# Patient Record
Sex: MALE | Race: WHITE | NOT HISPANIC OR LATINO | Employment: OTHER | URBAN - METROPOLITAN AREA
[De-identification: names, ages, dates, MRNs, and addresses within clinical notes are randomized per-mention and may not be internally consistent; named-entity substitution may affect disease eponyms.]

---

## 2018-11-20 ENCOUNTER — APPOINTMENT (OUTPATIENT)
Dept: PREADMISSION TESTING | Facility: HOSPITAL | Age: 73
End: 2018-11-20

## 2019-01-07 ENCOUNTER — DOCUMENTATION (OUTPATIENT)
Dept: SOCIAL WORK | Facility: HOSPITAL | Age: 74
End: 2019-01-07

## 2019-01-07 NOTE — PROGRESS NOTES
1/7/19 I spoke with Mr Singh by phone re: d/c plan for upcoming surgery. His plan is to return home. He lives with wife who works,but she plans to be home for 3 days post op, thereafter will be home during day ( she works night shift). He states he also works, [employer] is his primary insurance( he is covered under his wife's plan). He plans to attend pre op joint class tomorrow 1/8. We discussed options for PT, he would like to use [facility]. We also discussed Medicare guidelines that classify TKR's as overnight outpatient and insurance likely will do the same. He verbalized understanding. No other questions verbalized.  S.Kellerman RN, case mgt

## 2019-01-08 ENCOUNTER — HOSPITAL ENCOUNTER (OUTPATIENT)
Dept: GENERAL RADIOLOGY | Facility: HOSPITAL | Age: 74
Discharge: HOME | End: 2019-01-08
Admitting: ORTHOPAEDIC SURGERY

## 2019-01-08 ENCOUNTER — APPOINTMENT (OUTPATIENT)
Dept: PREADMISSION TESTING | Facility: HOSPITAL | Age: 74
End: 2019-01-08

## 2019-01-08 VITALS — BODY MASS INDEX: 28.23 KG/M2 | HEIGHT: 74 IN | WEIGHT: 220 LBS

## 2019-01-08 LAB
ANION GAP SERPL CALCULATED.3IONS-SCNC: 9 MMOL/L (ref 3–11)
BUN BLD-MCNC: 26 MG/DL (ref 9–23)
BUN/CREAT SERPL: 25.2 (ref 7–25)
CALCIUM SPEC-SCNC: 9.4 MG/DL (ref 8.7–10.4)
CHLORIDE SERPL-SCNC: 101 MMOL/L (ref 99–109)
CO2 SERPL-SCNC: 27 MMOL/L (ref 20–31)
CREAT BLD-MCNC: 1.03 MG/DL (ref 0.6–1.3)
DEPRECATED RDW RBC AUTO: 40.3 FL (ref 37–54)
ERYTHROCYTE [DISTWIDTH] IN BLOOD BY AUTOMATED COUNT: 12.7 % (ref 11.3–14.5)
GFR SERPL CREATININE-BSD FRML MDRD: 71 ML/MIN/1.73
GLUCOSE BLD-MCNC: 282 MG/DL (ref 70–100)
HBA1C MFR BLD: 11.1 % (ref 4.8–5.6)
HCT VFR BLD AUTO: 47.2 % (ref 38.9–50.9)
HGB BLD-MCNC: 15.9 G/DL (ref 13.1–17.5)
MCH RBC QN AUTO: 29.3 PG (ref 27–31)
MCHC RBC AUTO-ENTMCNC: 33.7 G/DL (ref 32–36)
MCV RBC AUTO: 87.1 FL (ref 80–99)
PLATELET # BLD AUTO: 217 10*3/MM3 (ref 150–450)
PMV BLD AUTO: 11.2 FL (ref 6–12)
POTASSIUM BLD-SCNC: 4.7 MMOL/L (ref 3.5–5.5)
RBC # BLD AUTO: 5.42 10*6/MM3 (ref 4.2–5.76)
SODIUM BLD-SCNC: 137 MMOL/L (ref 132–146)
WBC NRBC COR # BLD: 7.3 10*3/MM3 (ref 3.5–10.8)

## 2019-01-08 PROCEDURE — 36415 COLL VENOUS BLD VENIPUNCTURE: CPT

## 2019-01-08 PROCEDURE — 83036 HEMOGLOBIN GLYCOSYLATED A1C: CPT | Performed by: ORTHOPAEDIC SURGERY

## 2019-01-08 PROCEDURE — 80048 BASIC METABOLIC PNL TOTAL CA: CPT | Performed by: ORTHOPAEDIC SURGERY

## 2019-01-08 PROCEDURE — 71046 X-RAY EXAM CHEST 2 VIEWS: CPT

## 2019-01-08 PROCEDURE — 85027 COMPLETE CBC AUTOMATED: CPT | Performed by: ORTHOPAEDIC SURGERY

## 2019-01-08 RX ORDER — NAPROXEN 500 MG/1
500 TABLET ORAL 2 TIMES DAILY PRN
COMMUNITY
End: 2019-03-28 | Stop reason: HOSPADM

## 2019-01-08 ASSESSMENT — KOOS JR
KOOS JR SCORE: 57.14
KOOS JR SCORE: 12

## 2019-01-08 NOTE — DISCHARGE INSTRUCTIONS
The following information and instructions were given:    NPO after MN except sips of water with routine prescribed medication (except blood thinners, certain blood pressure medications, diabetes medications, or weight reducing medications) unless otherwise instructed by your physician.  Do not eat, drink, smoke or chew gum after midnight the night before surgery. This also includes no mints.    DO NOT shave for two days before your procedure.  Do not wear makeup.      DO NOT wear fingernail polish (gel/regular) and/or acrylic/artificial nails on the day of surgery.   If a patient had recent manicure and would rather not remove polish or artificial nails, then the minimum requirement is that the polish/artificial nails must be removed from the middle finger on each hand.      If patient is having surgery/procedure on an upper extremity, then the patient was instructed that fingernail polish/artificial fingernails must be removed for surgery.  NO EXCEPTIONS.      If patient is having surgery/procedure on a lower extremity, then the patient was instructed that toenail polish on both extremities must be removed for surgery.  NO EXCEPTIONS.    Remove all jewelry (advised to go to jeweler if unable to remove).  Jewelry, especially rings, can no longer be taped for surgery.    Leave anything you consider valuable at home.    Leave your suitcase in the car until after your surgery.    Bring the following with you (if applicable)       -picture ID and insurance cards       -Co-pay/deductible required by insurance       -Medications in the original bottles (not a list) including all over-the-counter  medications if not brought to PAT       -Copy of advance directive, living will or power of  documents if not  brought to [facility]       -CPAP or BIPAP mask and tubing (do not bring machine)       -Skin prep instructions sheet       -PAT Pass    Education booklet, brochure, handout or binder given to patient.    Pain Control  After Surgery handout given to patient.    Respirex use (handout given to patient) and pneumonia prevention.    Signs and Symptoms of infection discussed.    DVT Prevention education given.  Stressing the importance of ambulation.    Patient to apply Chlorhexadine wipes to surgical area (as instructed) the night before procedure and the AM of procedure.    When applicable patients with ERAS orders were instructed to drink 20 ounces of Gatorade or G2 for diabetics (or until full) the morning of surgery.  The Gatorade or G2 must be consumed at least 1 hour before arrival time on the day of surgery .  No RED Gatorade or G2.  Appropriate ERAS handout and/or booklet given to patient during PAT visit.

## 2019-01-08 NOTE — PAT
"Patient to apply Chlorhexadine wipes  to surgical area (as instructed) the night before procedure and the AM of procedure. Wipes provided.    Patient instructed to drink 20 ounces (or until full) of Gatorade or 20 ounces of G2 (if diabetic) and complete 3 hours before your surgery start time. (NO RED Gatorade or G2)    Patient verbalized understanding.    Patient scored 4 out of 5 on memory test.  He remembered 2 of the words but when questioned about the third word, he seemed a little defensive. And stated \"Give me a minute and I will try again.\"  But he still couldn't remember.  Patient jean the clock easily and correctly except he put one of the hands on the 12 before quickly realizing his error.  While patient was out of the room, the wife expressed some concerns about his memory and asked if we could take precautions after surgery to prevent delays in his hospital stay due to confusion.  Confusion booklet given to patient and wife for review.  Notified my supervisor, Spring Soni, of patient's potential for confusion after surgery so she could forward his information to the proper staff to prepare for his arrival on 1/17/18.    Patient cleared by Dr Apodaca on 12/12/18    Patient also saw his family MD for clearance on 12/20/18  "

## 2019-02-12 ENCOUNTER — TRANSCRIBE ORDERS (OUTPATIENT)
Dept: ADMINISTRATIVE | Facility: HOSPITAL | Age: 74
End: 2019-02-12

## 2019-02-12 DIAGNOSIS — I20.0 UNSTABLE ANGINA (HCC): Primary | ICD-10-CM

## 2019-02-14 ENCOUNTER — HOSPITAL ENCOUNTER (OUTPATIENT)
Facility: HOSPITAL | Age: 74
Setting detail: HOSPITAL OUTPATIENT SURGERY
Discharge: HOME | End: 2019-02-14
Attending: INTERNAL MEDICINE | Admitting: INTERNAL MEDICINE

## 2019-02-14 VITALS
TEMPERATURE: 97.2 F | WEIGHT: 217.37 LBS | HEIGHT: 74 IN | HEART RATE: 74 BPM | DIASTOLIC BLOOD PRESSURE: 78 MMHG | RESPIRATION RATE: 16 BRPM | SYSTOLIC BLOOD PRESSURE: 107 MMHG | OXYGEN SATURATION: 94 % | BODY MASS INDEX: 27.9 KG/M2

## 2019-02-14 DIAGNOSIS — I20.0 UNSTABLE ANGINA (HCC): ICD-10-CM

## 2019-02-14 LAB
BUN BLDA-MCNC: 19 MG/DL (ref 8–26)
CA-I BLDA-SCNC: 1.24 MMOL/L (ref 1.2–1.32)
CHLORIDE BLDA-SCNC: 101 MMOL/L (ref 98–109)
CO2 BLDA-SCNC: 27 MMOL/L (ref 24–29)
CREAT BLDA-MCNC: 0.8 MG/DL (ref 0.6–1.3)
GLUCOSE BLDC GLUCOMTR-MCNC: 139 MG/DL (ref 70–130)
GLUCOSE BLDC GLUCOMTR-MCNC: 245 MG/DL (ref 70–130)
HCT VFR BLDA CALC: 45 % (ref 38–51)
HGB BLDA-MCNC: 15.3 G/DL (ref 12–17)
POTASSIUM BLDA-SCNC: 4.4 MMOL/L (ref 3.5–4.9)
SODIUM BLDA-SCNC: 142 MMOL/L (ref 138–146)

## 2019-02-14 PROCEDURE — C1769 GUIDE WIRE: HCPCS | Performed by: INTERNAL MEDICINE

## 2019-02-14 PROCEDURE — 93005 ELECTROCARDIOGRAM TRACING: CPT | Performed by: INTERNAL MEDICINE

## 2019-02-14 PROCEDURE — 36415 COLL VENOUS BLD VENIPUNCTURE: CPT

## 2019-02-14 PROCEDURE — 25010000002 MIDAZOLAM PER 1 MG: Performed by: INTERNAL MEDICINE

## 2019-02-14 PROCEDURE — 25010000002 FENTANYL CITRATE (PF) 100 MCG/2ML SOLUTION: Performed by: INTERNAL MEDICINE

## 2019-02-14 PROCEDURE — C1894 INTRO/SHEATH, NON-LASER: HCPCS | Performed by: INTERNAL MEDICINE

## 2019-02-14 PROCEDURE — 85014 HEMATOCRIT: CPT

## 2019-02-14 PROCEDURE — 82962 GLUCOSE BLOOD TEST: CPT

## 2019-02-14 PROCEDURE — 75630 X-RAY AORTA LEG ARTERIES: CPT | Performed by: INTERNAL MEDICINE

## 2019-02-14 PROCEDURE — 80047 BASIC METABLC PNL IONIZED CA: CPT

## 2019-02-14 PROCEDURE — 0 IOPAMIDOL PER 1 ML: Performed by: INTERNAL MEDICINE

## 2019-02-14 PROCEDURE — C1760 CLOSURE DEV, VASC: HCPCS | Performed by: INTERNAL MEDICINE

## 2019-02-14 PROCEDURE — 93458 L HRT ARTERY/VENTRICLE ANGIO: CPT | Performed by: INTERNAL MEDICINE

## 2019-02-14 RX ORDER — ASPIRIN 325 MG
325 TABLET, DELAYED RELEASE (ENTERIC COATED) ORAL DAILY
Status: DISCONTINUED | OUTPATIENT
Start: 2019-02-14 | End: 2019-02-14 | Stop reason: HOSPADM

## 2019-02-14 RX ORDER — LIDOCAINE HYDROCHLORIDE 10 MG/ML
INJECTION, SOLUTION EPIDURAL; INFILTRATION; INTRACAUDAL; PERINEURAL AS NEEDED
Status: DISCONTINUED | OUTPATIENT
Start: 2019-02-14 | End: 2019-02-14 | Stop reason: HOSPADM

## 2019-02-14 RX ORDER — ACETAMINOPHEN 325 MG/1
650 TABLET ORAL EVERY 4 HOURS PRN
Status: DISCONTINUED | OUTPATIENT
Start: 2019-02-14 | End: 2019-02-14 | Stop reason: HOSPADM

## 2019-02-14 RX ORDER — NICOTINE POLACRILEX 4 MG
15 LOZENGE BUCCAL
Status: DISCONTINUED | OUTPATIENT
Start: 2019-02-14 | End: 2019-02-14 | Stop reason: HOSPADM

## 2019-02-14 RX ORDER — DEXTROSE MONOHYDRATE 25 G/50ML
25 INJECTION, SOLUTION INTRAVENOUS
Status: DISCONTINUED | OUTPATIENT
Start: 2019-02-14 | End: 2019-02-14 | Stop reason: HOSPADM

## 2019-02-14 RX ORDER — ALPRAZOLAM 0.25 MG/1
0.25 TABLET ORAL 3 TIMES DAILY PRN
Status: DISCONTINUED | OUTPATIENT
Start: 2019-02-14 | End: 2019-02-14 | Stop reason: HOSPADM

## 2019-02-14 RX ORDER — OXYCODONE AND ACETAMINOPHEN 10; 325 MG/1; MG/1
1 TABLET ORAL EVERY 4 HOURS PRN
Status: DISCONTINUED | OUTPATIENT
Start: 2019-02-14 | End: 2019-02-14 | Stop reason: HOSPADM

## 2019-02-14 RX ORDER — FENTANYL CITRATE 50 UG/ML
INJECTION, SOLUTION INTRAMUSCULAR; INTRAVENOUS AS NEEDED
Status: DISCONTINUED | OUTPATIENT
Start: 2019-02-14 | End: 2019-02-14 | Stop reason: HOSPADM

## 2019-02-14 RX ORDER — MIDAZOLAM HYDROCHLORIDE 1 MG/ML
INJECTION INTRAMUSCULAR; INTRAVENOUS AS NEEDED
Status: DISCONTINUED | OUTPATIENT
Start: 2019-02-14 | End: 2019-02-14 | Stop reason: HOSPADM

## 2019-02-14 RX ORDER — TEMAZEPAM 7.5 MG/1
7.5 CAPSULE ORAL NIGHTLY PRN
Status: DISCONTINUED | OUTPATIENT
Start: 2019-02-14 | End: 2019-02-14 | Stop reason: HOSPADM

## 2019-02-14 RX ORDER — SODIUM CHLORIDE 9 MG/ML
250 INJECTION, SOLUTION INTRAVENOUS CONTINUOUS
Status: ACTIVE | OUTPATIENT
Start: 2019-02-14 | End: 2019-02-14

## 2019-02-14 RX ORDER — HYDROCODONE BITARTRATE AND ACETAMINOPHEN 5; 325 MG/1; MG/1
1 TABLET ORAL EVERY 4 HOURS PRN
Status: DISCONTINUED | OUTPATIENT
Start: 2019-02-14 | End: 2019-02-14 | Stop reason: HOSPADM

## 2019-02-14 RX ADMIN — ACETAMINOPHEN 650 MG: 325 TABLET, FILM COATED ORAL at 16:40

## 2019-02-14 RX ADMIN — ASPIRIN 325 MG: 325 TABLET, COATED ORAL at 13:08

## 2019-02-14 NOTE — H&P
Pre-Cardiac Catheterization Report  Cardiovascular Laboratory  [facility]      Patient:  Jermaine Singh  :  1945  PCP:  Farooq Painter MD  PHONE:  [phone]    DATE: 2019    BRIEF HPI:  Jermaine Singh is a 73 y.o. male Mr. Singh is a pleasant 73-year-old male with hypertension, hypercholesterolemia, hypothyroidism, and diabetes mellitus.  He is complaining of a 6 month history of lower extremity claudication with a nonhealing sore on his left toe.  He is also complaining of chest pain with associated shortness of breath.  He recently underwent a stress test that was equivocal.  He now presents for left heart catheterization and AA with runoff.    Cardiac Risk Factors:  advanced age (older than 55 for men, 65 for women), diabetes mellitus, dyslipidemia, family history of premature cardiovascular disease, hypertension, male gender    Anginal class in last 2 weeks:  CCS class III    CHF Class in last 2 weeks:  NYHA Class II    Cardiogenic shock:  no    Cardiac arrest <24 hours:  no    Stress test within last 6 months:   yes   Details:    Previous cardiac catheterization:  no  Details:     Previous CABG:  no  Details:      Allergies:     IV contrast allergy:  no  No Known Allergies    MEDICATIONS:  Prior to Admission medications    Medication Sig Start Date End Date Taking? Authorizing Provider   amLODIPine (NORVASC) 5 MG tablet Take 5 mg by mouth Daily.    Emergency, Nurse Epic, RN   aspirin 81 MG chewable tablet Chew 81 mg Daily. Will stop for surgery    Emergency, Nurse Fredy RN   Cholecalciferol (VITAMIN D3) 5000 units capsule capsule Take 20,000 Units by mouth Every Morning.    Provider, MD Chelo   glipiZIDE (GLUCOTROL) 5 MG tablet Take 10 mg by mouth 2 (Two) Times a Day Before Meals.    Emergency, Nurse FRAN Daniel   insulin detemir (LEVEMIR FLEXTOUCH) 100 UNIT/ML injection Inject 36 Units under the skin into the appropriate area as directed Daily. Increasing every 2 days  by 2 units until glucose levels stabilized (recently started on inuslin)    ProviderChelo MD   KETOPROFEN-LIDO-GABAPENTIN EX Apply 1 dose topically 4 (Four) Times a Day As Needed.    Chelo Felix MD   KETOPROFEN-LIDOCAINE HCL EX Apply 1 dose topically 3 (Three) Times a Day As Needed.    Chelo Felix MD   levothyroxine (SYNTHROID, LEVOTHROID) 50 MCG tablet Take 50 mcg by mouth Daily.    Emergency, Nurse FRAN Daniel   losartan (COZAAR) 100 MG tablet Take 100 mg by mouth Every Morning.    Emergency, Nurse Epic, RN   naproxen (NAPROSYN) 500 MG tablet Take 500 mg by mouth 2 (Two) Times a Day With Meals. Will stop for surgery approximately 5 days before surgery per Dr Carrillo's office    ProviderChelo MD       Past medical & surgical history, social and family history reviewed in the electronic medical record.    ROS:  Cardiovascular ROS: positive for - chest pain, dyspnea on exertion, shortness of breath and lower extremity claudication    Physical Exam:    Vitals: There were no vitals filed for this visit. There were no vitals filed for this visit.    General Appearance:    Alert, cooperative, in no acute distress   Head:    Normocephalic, without obvious abnormality, atraumatic   Eyes:            Lids and lashes normal, conjunctivae and sclerae normal, no   icterus, no pallor, corneas clear, PERRLA   Ears:    Ears appear intact with no abnormalities noted   Neck:   No adenopathy, supple, trachea midline, no thyromegaly, +   carotid bruit, no JVD   Back:     No kyphosis present, no scoliosis present, range of motion normal   Lungs:     Clear to auscultation,respirations regular, even and                  unlabored    Heart:    Regular rhythm and normal rate, normal S1 and S2, no            murmur, no gallop, no rub, no click   Chest Wall:    No abnormalities observed   Abdomen:     Normal bowel sounds, no masses, no organomegaly, soft        non-tender, non-distended, no guarding, no rebound                 tenderness   Rectal:     Deferred   Extremities:   Moves all extremities well, no edema, no cyanosis, no             redness   Pulses:   Pulses decreased bilaterally   Skin:   No bleeding, bruising or rash   Neurologic:   Cranial nerves 2 - 12 grossly intact, sensation intact     Barbaeu Test:  Left: Not Assessed  (oxymetric Allens) Right: Not Assessed             No results found for: CHLPL, TRIG, HDL, LDLDIRECT, AST, ALT        Impression      · Unstable angina/equivocal stress test  · Abnormal ABIs    Plan     · Procedure to perform: LHC and AA with runoff  · Planned access:  RANULFO Madden M.D.  02/14/19  12:10 PM

## 2019-02-17 ENCOUNTER — HOSPITAL ENCOUNTER (EMERGENCY)
Facility: HOSPITAL | Age: 74
Discharge: HOME | End: 2019-02-17
Attending: EMERGENCY MEDICINE | Admitting: EMERGENCY MEDICINE

## 2019-02-17 ENCOUNTER — APPOINTMENT (OUTPATIENT)
Dept: CARDIOLOGY | Facility: HOSPITAL | Age: 74
End: 2019-02-17

## 2019-02-17 VITALS
HEIGHT: 74 IN | DIASTOLIC BLOOD PRESSURE: 85 MMHG | TEMPERATURE: 97.4 F | SYSTOLIC BLOOD PRESSURE: 150 MMHG | WEIGHT: 211 LBS | RESPIRATION RATE: 18 BRPM | HEART RATE: 76 BPM | BODY MASS INDEX: 27.08 KG/M2 | OXYGEN SATURATION: 96 %

## 2019-02-17 DIAGNOSIS — T14.8XXA HEMATOMA: Primary | ICD-10-CM

## 2019-02-17 DIAGNOSIS — I10 HYPERTENSION, UNSPECIFIED TYPE: ICD-10-CM

## 2019-02-17 PROCEDURE — 93926 LOWER EXTREMITY STUDY: CPT | Performed by: INTERNAL MEDICINE

## 2019-02-17 PROCEDURE — 93926 LOWER EXTREMITY STUDY: CPT

## 2019-02-17 PROCEDURE — 99283 EMERGENCY DEPT VISIT LOW MDM: CPT

## 2019-02-17 NOTE — ED PROVIDER NOTES
Subjective   Patient presents to the emergency Department today with bruising in the right groin status post having a heart catheterization 4 days ago.  Patient reports she's not really having any pain he's having no more edema in the area just noticed that the head ecchymosis.  Patient reports that the heart catheterization revealed that he needs bypass surgery and is scheduled to see Dr. Au next week.  He does not have any chest pain shortness of breath abdominal pain or discomfort of the right lower extremity.  He is not currently on any anticoagulants.        History provided by:  Significant other and patient   used: No    Lower Extremity Issue   Lower extremity pain location: Right groin.  Time since incident:  4 days  Injury: yes    Mechanism of injury comment:  Heart cath  Pain details:     Quality: no pain just discoloration.    Radiates to:  Does not radiate    Severity:  No pain    Onset quality:  Gradual    Progression:  Worsening  Chronicity:  New  Dislocation: no    Foreign body present:  No foreign bodies  Tetanus status:  Up to date  Relieved by:  Nothing  Worsened by:  Nothing  Ineffective treatments:  None tried  Associated symptoms: no back pain, no decreased ROM, no fever, no itching, no numbness, no stiffness and no tingling    Risk factors: no concern for non-accidental trauma        Review of Systems   Constitutional: Negative for chills and fever.   Respiratory: Negative for chest tightness, shortness of breath and wheezing.    Cardiovascular: Negative for chest pain and palpitations.   Gastrointestinal: Negative for abdominal pain, diarrhea and nausea.   Genitourinary: Negative for dysuria, hematuria and urgency.   Musculoskeletal: Negative for back pain, neck stiffness and stiffness.   Skin: Negative for itching, pallor and rash.   Neurological: Negative for dizziness and weakness.   Hematological: Negative for adenopathy.   Psychiatric/Behavioral: Negative.    All  other systems reviewed and are negative.      Past Medical History:   Diagnosis Date   • Asthma    • COPD (chronic obstructive pulmonary disease) (CMS/[facility])    • Diabetes mellitus (CMS/HCC) 2000    started on Insulin in the last month; started on po meds in 2000   • Disease of thyroid gland     po meds daily for hypothyroidism    • History of fracture as a child     rt leg- severe    • Hyperlipidemia    • Hypertension    • Peripheral neuropathy    • RBBB    • Right knee pain    • Vitamin D deficiency        No Known Allergies    Past Surgical History:   Procedure Laterality Date   • APPENDECTOMY     • CARDIAC CATHETERIZATION N/A 2/14/2019    Procedure: Left Heart Cath;  Surgeon: Cooper Apodaca MD;  [facility] CATH INVASIVE LOCATION;  Service: Cardiology   • COLONOSCOPY     • EYE SURGERY Bilateral     cataracts    • LACERATION REPAIR     • LEG SURGERY      2 for fracture of rt leg    • TONSILLECTOMY         History reviewed. No pertinent family history.    Social History     Socioeconomic History   • Marital status:      Spouse name: Not on file   • Number of children: Not on file   • Years of education: Not on file   • Highest education level: Not on file   Tobacco Use   • Smoking status: Never Smoker   • Smokeless tobacco: Never Used   Substance and Sexual Activity   • Alcohol use: No   • Drug use: Defer   • Sexual activity: Defer           Objective   Physical Exam   Constitutional: He is oriented to person, place, and time. He appears well-developed and well-nourished.   HENT:   Head: Normocephalic and atraumatic.   Right Ear: External ear normal.   Left Ear: External ear normal.   Nose: Nose normal.   Mouth/Throat: Oropharynx is clear and moist.   Eyes: Conjunctivae and EOM are normal. Pupils are equal, round, and reactive to light. No scleral icterus.   Neck: Normal range of motion. No thyromegaly present.   Cardiovascular: Normal rate, regular rhythm and normal heart sounds.    Pulmonary/Chest: Effort normal and breath sounds normal. No respiratory distress. He has no wheezes. He has no rales. He exhibits no tenderness.   Abdominal: Soft. Bowel sounds are normal. He exhibits no distension. There is no tenderness.   Musculoskeletal: Normal range of motion.   Lymphadenopathy:     He has no cervical adenopathy.   Neurological: He is alert and oriented to person, place, and time. He has normal reflexes. He displays normal reflexes. No cranial nerve deficit. Coordination normal.   Skin: Skin is warm and dry.   Right groin has ecchymosis there still dressing intact.  He has no pulsatile masses palpable.  He has distal pulses are strong and equal sensation is intact.   Psychiatric: He has a normal mood and affect. His behavior is normal. Judgment and thought content normal.   Nursing note and vitals reviewed.      Procedures           ED Course  ED Course as of Feb 22 1104   Sun Feb 17, 2019   1221 Doppler was negative for pseudoaneurysm or clot.  [ROSELIA]   1222 Chest the findings with the patient and his family.  He will follow up with Dr. Au this week.  We'll change the bandage for him.  [ROSELIA]      ED Course User Index  [ROSELIA] Donny Canchola PA                  MDM  Number of Diagnoses or Management Options  Hematoma: new and requires workup  Hypertension, unspecified type: new and requires workup     Amount and/or Complexity of Data Reviewed  Tests in the radiology section of CPT®: reviewed and ordered  Discuss the patient with other providers: yes    Patient Progress  Patient progress: stable        Final diagnoses:   Hematoma   Hypertension, unspecified type            Donny Canchola PA  02/22/19 1104

## 2019-02-18 LAB
BH CV ECHO MEAS - BSA(HAYCOCK): 2.2 M^2
BH CV ECHO MEAS - BSA: 2.2 M^2
BH CV ECHO MEAS - BZI_BMI: 27.1 KILOGRAMS/M^2
BH CV ECHO MEAS - BZI_METRIC_HEIGHT: 188 CM
BH CV ECHO MEAS - BZI_METRIC_WEIGHT: 95.7 KG
BH CV RIGHT GROIN PSA PROCEDURE SCRIPTING LRR: 1
BH CV XLRA MEAS EXT ILIAC A PSV RIGHT: 86.4 CM/SEC
PROX PFA PSV RIGHT: 70.7 CM/SEC
PROX SFA PSV RIGHT: 77 CM/SEC
RIGHT CFA PROX SYS PSV: 55 CM/SEC
RIGHT GROIN CFA SYS: 85 CM/SEC

## 2019-03-04 ENCOUNTER — OFFICE VISIT (OUTPATIENT)
Dept: CARDIAC SURGERY | Facility: CLINIC | Age: 74
End: 2019-03-04

## 2019-03-04 VITALS
DIASTOLIC BLOOD PRESSURE: 90 MMHG | HEIGHT: 74 IN | SYSTOLIC BLOOD PRESSURE: 150 MMHG | WEIGHT: 221 LBS | TEMPERATURE: 97.9 F | BODY MASS INDEX: 28.36 KG/M2 | OXYGEN SATURATION: 99 % | HEART RATE: 83 BPM

## 2019-03-04 DIAGNOSIS — I25.10 CORONARY ARTERY DISEASE INVOLVING NATIVE CORONARY ARTERY OF NATIVE HEART, ANGINA PRESENCE UNSPECIFIED: Primary | ICD-10-CM

## 2019-03-04 PROCEDURE — 99205 OFFICE O/P NEW HI 60 MIN: CPT | Performed by: THORACIC SURGERY (CARDIOTHORACIC VASCULAR SURGERY)

## 2019-03-04 NOTE — PROGRESS NOTES
03/04/2019  Patient Information  Jermaine Singh                                                                                          [address] [place] [address]   1945  'PCP/Referring Physician'  Farooq Painter MD  [phone]  Cooper Apodaca MD    Chief Complaint   Patient presents with   • Consult     NP per Dr. Apodaca for CAD-Asymptomatic, Workup started with Ingrown Toenail that would not Heal       History of Present Illness:   This patient was referred to me to evaluate for coronary bypass grafting surgery.  Recent cardiac catheterization demonstrated significant multivessel disease.  The patient himself denies any anginal symptoms whatsoever, but primarily complains of increasing fatigue in the last 3-6 months with some mild worsening shortness of breath with minimal activity.  With rest, his shortness of breath improves.  He has diabetes and it appears poorly controlled with hemoglobin A1c greater than 11.  He is accompanied today by his family to discuss surgery.      Patient Active Problem List   Diagnosis   • Unstable angina (CMS/HCC)   • Coronary artery disease involving native coronary artery of native heart     Past Medical History:   Diagnosis Date   • Asthma    • COPD (chronic obstructive pulmonary disease) (CMS/HCC)    • Coronary artery disease    • Diabetes mellitus (CMS/HCC) 2000    started on Insulin in the last month; started on po meds in 2000   • Disease of thyroid gland     po meds daily for hypothyroidism    • History of fracture as a child     rt leg- severe    • Hyperlipidemia    • Hypertension    • Hypothyroidism    • Peripheral neuropathy    • Peripheral vascular disease (CMS/HCC)    • RBBB    • Right knee pain    • Vitamin D deficiency      Past Surgical History:   Procedure Laterality Date   • APPENDECTOMY     • CARDIAC CATHETERIZATION N/A 2/14/2019    Procedure: Left Heart Cath;  Surgeon: Cooper Apodaca MD;  Location: [facility] INVASIVE  LOCATION;  Service: Cardiology   • COLONOSCOPY     • EYE SURGERY Bilateral     cataracts    • KNEE SURGERY Bilateral    • LACERATION REPAIR     • LEG SURGERY      2 for fracture of rt leg    • TONSILLECTOMY      Adnoidectomy       Current Outpatient Medications:   •  amLODIPine (NORVASC) 5 MG tablet, Take 5 mg by mouth Daily., Disp: , Rfl:   •  aspirin 81 MG chewable tablet, Chew 81 mg Daily. Will stop for surgery, Disp: , Rfl:   •  glipiZIDE (GLUCOTROL) 5 MG tablet, Take 10 mg by mouth 2 (Two) Times a Day Before Meals., Disp: , Rfl:   •  insulin detemir (LEVEMIR FLEXTOUCH) 100 UNIT/ML injection, Inject 40 Units under the skin into the appropriate area as directed Daily., Disp: , Rfl:   •  levothyroxine (SYNTHROID, LEVOTHROID) 50 MCG tablet, Take 50 mcg by mouth Daily., Disp: , Rfl:   •  losartan (COZAAR) 100 MG tablet, Take 100 mg by mouth Every Morning., Disp: , Rfl:   •  naproxen (NAPROSYN) 500 MG tablet, Take 500 mg by mouth 2 (Two) Times a Day With Meals. Will stop for surgery approximately 5 days before surgery per Dr Carrillo's office, Disp: , Rfl:   •  Cholecalciferol (VITAMIN D3) 5000 units capsule capsule, Take 20,000 Units by mouth Every Morning., Disp: , Rfl:   No Known Allergies  Social History     Socioeconomic History   • Marital status:      Spouse name: Not on file   • Number of children: 2   • Years of education: Not on file   • Highest education level: Not on file   Social Needs   • Financial resource strain: Not on file   • Food insecurity - worry: Not on file   • Food insecurity - inability: Not on file   • Transportation needs - medical: Not on file   • Transportation needs - non-medical: Not on file   Occupational History   • Occupation: Sales at [employer]   Tobacco Use   • Smoking status: Never Smoker   • Smokeless tobacco: Never Used   Substance and Sexual Activity   • Alcohol use: No   • Drug use: No   • Sexual activity: Defer   Other Topics Concern   • Not on file   Social History  "Narrative    Lives in [place].     Family History   Problem Relation Age of Onset   • Coronary artery disease Mother    • Diabetes Mother    • Cancer Father      Review of Systems   Constitution: Positive for chills. Negative for fever, malaise/fatigue, night sweats and weight loss.   HENT: Negative for hearing loss, odynophagia and sore throat.    Cardiovascular: Positive for claudication and dyspnea on exertion (Bilateral). Negative for chest pain, leg swelling, orthopnea and palpitations.   Respiratory: Negative for cough and hemoptysis.    Endocrine: Negative for cold intolerance, heat intolerance, polydipsia, polyphagia and polyuria.   Hematologic/Lymphatic: Does not bruise/bleed easily.   Skin: Negative for itching and rash.   Musculoskeletal: Positive for joint pain. Negative for joint swelling and myalgias.   Gastrointestinal: Positive for diarrhea. Negative for abdominal pain, constipation, hematemesis, hematochezia, melena, nausea and vomiting.   Genitourinary: Negative for dysuria, frequency and hematuria.   Neurological: Negative for focal weakness, headaches, numbness and seizures.   Psychiatric/Behavioral: Negative for suicidal ideas.   All other systems reviewed and are negative.    Vitals:    03/04/19 0757   BP: 150/90   BP Location: Right arm   Patient Position: Sitting   Pulse: 83   Temp: 97.9 °F (36.6 °C)   SpO2: 99%   Weight: 100 kg (221 lb)   Height: 188 cm (74\")      Physical Exam   CONSTITUTIONAL: Alert and conversant, Well dressed, Well nourished, No acute distress  EYES: Sclera clean, Anicteric, Pupils equal  ENT: No nasal deviation, Trachea midline  NECK: No neck masses, Supple  LUNGS: No wheezing, Cough, non-congested  HEART: No rubs, No murmurs  GASTROINTESTINAL: Soft, non-distended, No masses, Non tender  to palpation, normal bowel sounds  NEURO: No motor deficits, No sensory deficits, Cranial Nerves 2 through 12 grossly intact  PSYCHIATRIC: Oriented to person, place and time, No " memory deficits, Mood appropriate  VASCULAR: No carotid bruits, Femoral pulses palpable and symmetric  MUSKULOSKELETAL: No extremity trauma or extremity asymmetry    Labs/Imaging:   I obtained and reviewed the cardiac catheterization demonstrating coronary disease.    Assessment/Plan:   This patient has significant coronary artery disease and extremely small diabetic appearing vessels.  I am primarily concerned with his left anterior descending coronary, as it is extremely small and calcified and will require distal bypass grafting.  In general, his coronaries are extremely small and I think his operative mortality is not low due to his anatomy.  I, however, I do not see any other good catheter-based options or medical options on this 73-year-old patient.  I discussed the risks of surgery including stroke, bleeding, infection, death,  renal failure and he is agreeable to proceed.  They do understand that I do not believe this is a low risk procedure.    Patient Active Problem List   Diagnosis   • Unstable angina (CMS/HCC)   • Coronary artery disease involving native coronary artery of native heart     CC: MD Farooq Banuelos MD Debbie Moore, , editing for Ap Au M.D.    I, Ap Au MD, have read and agree with the editing done by Xiomara Matson, .

## 2019-03-06 DIAGNOSIS — I25.118 CORONARY ARTERY DISEASE INVOLVING NATIVE CORONARY ARTERY OF NATIVE HEART WITH OTHER FORM OF ANGINA PECTORIS (HCC): Primary | ICD-10-CM

## 2019-03-06 PROBLEM — I25.10 CORONARY ARTERY DISEASE: Status: ACTIVE | Noted: 2019-03-06

## 2019-03-08 ENCOUNTER — PREP FOR SURGERY (OUTPATIENT)
Dept: OTHER | Facility: HOSPITAL | Age: 74
End: 2019-03-08

## 2019-03-08 DIAGNOSIS — I25.118 CORONARY ARTERY DISEASE INVOLVING NATIVE CORONARY ARTERY OF NATIVE HEART WITH OTHER FORM OF ANGINA PECTORIS (HCC): Primary | ICD-10-CM

## 2019-03-08 RX ORDER — CHLORHEXIDINE GLUCONATE 500 MG/1
1 CLOTH TOPICAL EVERY 12 HOURS PRN
Status: CANCELLED | OUTPATIENT
Start: 2019-03-21

## 2019-03-08 RX ORDER — ACETAMINOPHEN 325 MG/1
650 TABLET ORAL EVERY 4 HOURS PRN
Status: CANCELLED | OUTPATIENT
Start: 2019-03-22

## 2019-03-08 RX ORDER — ASPIRIN 325 MG
325 TABLET ORAL NIGHTLY
Status: CANCELLED | OUTPATIENT
Start: 2019-03-21 | End: 2019-03-22

## 2019-03-08 RX ORDER — CHLORHEXIDINE GLUCONATE 500 MG/1
1 CLOTH TOPICAL EVERY 12 HOURS PRN
Status: CANCELLED | OUTPATIENT
Start: 2019-03-22

## 2019-03-08 RX ORDER — CHLORHEXIDINE GLUCONATE 0.12 MG/ML
15 RINSE ORAL ONCE
Status: CANCELLED | OUTPATIENT
Start: 2019-03-22 | End: 2019-03-22

## 2019-03-08 RX ORDER — NITROGLYCERIN 0.4 MG/1
0.4 TABLET SUBLINGUAL
Status: CANCELLED | OUTPATIENT
Start: 2019-03-22

## 2019-03-21 ENCOUNTER — ANESTHESIA EVENT (OUTPATIENT)
Dept: PERIOP | Facility: HOSPITAL | Age: 74
End: 2019-03-21

## 2019-03-21 ENCOUNTER — HOSPITAL ENCOUNTER (OUTPATIENT)
Dept: GENERAL RADIOLOGY | Facility: HOSPITAL | Age: 74
Discharge: HOME | End: 2019-03-21
Admitting: PHYSICIAN ASSISTANT

## 2019-03-21 ENCOUNTER — APPOINTMENT (OUTPATIENT)
Dept: PREADMISSION TESTING | Facility: HOSPITAL | Age: 74
End: 2019-03-21

## 2019-03-21 ENCOUNTER — HOSPITAL ENCOUNTER (OUTPATIENT)
Dept: PULMONOLOGY | Facility: HOSPITAL | Age: 74
Discharge: HOME | End: 2019-03-21

## 2019-03-21 VITALS — BODY MASS INDEX: 28.54 KG/M2 | HEIGHT: 74 IN | WEIGHT: 222.4 LBS

## 2019-03-21 DIAGNOSIS — I25.118 CORONARY ARTERY DISEASE INVOLVING NATIVE CORONARY ARTERY OF NATIVE HEART WITH OTHER FORM OF ANGINA PECTORIS (HCC): ICD-10-CM

## 2019-03-21 DIAGNOSIS — I65.23 CAROTID STENOSIS, ASYMPTOMATIC, BILATERAL: Primary | ICD-10-CM

## 2019-03-21 LAB
ABO GROUP BLD: NORMAL
ALBUMIN SERPL-MCNC: 4.45 G/DL (ref 3.2–4.8)
ALBUMIN/GLOB SERPL: 2.2 G/DL (ref 1.5–2.5)
ALP SERPL-CCNC: 72 U/L (ref 25–100)
ALT SERPL W P-5'-P-CCNC: 28 U/L (ref 7–40)
AMPHET+METHAMPHET UR QL: NEGATIVE
AMPHETAMINES UR QL: NEGATIVE
ANION GAP SERPL CALCULATED.3IONS-SCNC: 11 MMOL/L (ref 3–11)
APTT PPP: 30.4 SECONDS (ref 24–37)
AST SERPL-CCNC: 18 U/L (ref 0–33)
BARBITURATES UR QL SCN: NEGATIVE
BASOPHILS # BLD AUTO: 0.04 10*3/MM3 (ref 0–0.2)
BASOPHILS NFR BLD AUTO: 0.5 % (ref 0–1)
BENZODIAZ UR QL SCN: NEGATIVE
BILIRUB SERPL-MCNC: 0.4 MG/DL (ref 0.3–1.2)
BLD GP AB SCN SERPL QL: NEGATIVE
BUN BLD-MCNC: 24 MG/DL (ref 9–23)
BUN/CREAT SERPL: 18.2 (ref 7–25)
BUPRENORPHINE SERPL-MCNC: NEGATIVE NG/ML
CALCIUM SPEC-SCNC: 9.3 MG/DL (ref 8.7–10.4)
CANNABINOIDS SERPL QL: NEGATIVE
CHLORIDE SERPL-SCNC: 103 MMOL/L (ref 99–109)
CO2 SERPL-SCNC: 26 MMOL/L (ref 20–31)
COCAINE UR QL: NEGATIVE
CREAT BLD-MCNC: 1.32 MG/DL (ref 0.6–1.3)
DEPRECATED RDW RBC AUTO: 42.2 FL (ref 37–54)
EOSINOPHIL # BLD AUTO: 0.35 10*3/MM3 (ref 0–0.3)
EOSINOPHIL NFR BLD AUTO: 4.2 % (ref 0–3)
ERYTHROCYTE [DISTWIDTH] IN BLOOD BY AUTOMATED COUNT: 13.2 % (ref 11.3–14.5)
GFR SERPL CREATININE-BSD FRML MDRD: 53 ML/MIN/1.73
GLOBULIN UR ELPH-MCNC: 2.1 GM/DL
GLUCOSE BLD-MCNC: 231 MG/DL (ref 70–100)
HBA1C MFR BLD: 9 % (ref 4.8–5.6)
HCT VFR BLD AUTO: 45.1 % (ref 38.9–50.9)
HGB BLD-MCNC: 15.3 G/DL (ref 13.1–17.5)
IMM GRANULOCYTES # BLD AUTO: 0.03 10*3/MM3 (ref 0–0.05)
IMM GRANULOCYTES NFR BLD AUTO: 0.4 % (ref 0–0.6)
INR PPP: 0.93 (ref 0.85–1.16)
LYMPHOCYTES # BLD AUTO: 2.13 10*3/MM3 (ref 0.6–4.8)
LYMPHOCYTES NFR BLD AUTO: 25.8 % (ref 24–44)
MAGNESIUM SERPL-MCNC: 2 MG/DL (ref 1.3–2.7)
MCH RBC QN AUTO: 29.7 PG (ref 27–31)
MCHC RBC AUTO-ENTMCNC: 33.9 G/DL (ref 32–36)
MCV RBC AUTO: 87.6 FL (ref 80–99)
METHADONE UR QL SCN: NEGATIVE
MONOCYTES # BLD AUTO: 0.37 10*3/MM3 (ref 0–1)
MONOCYTES NFR BLD AUTO: 4.5 % (ref 0–12)
NEUTROPHILS # BLD AUTO: 5.36 10*3/MM3 (ref 1.5–8.3)
NEUTROPHILS NFR BLD AUTO: 65 % (ref 41–71)
OPIATES UR QL: NEGATIVE
OXYCODONE UR QL SCN: NEGATIVE
PA ADP PRP-ACNC: 202 PRU
PCP UR QL SCN: NEGATIVE
PLATELET # BLD AUTO: 277 10*3/MM3 (ref 150–450)
PMV BLD AUTO: 11.5 FL (ref 6–12)
POTASSIUM BLD-SCNC: 4.5 MMOL/L (ref 3.5–5.5)
PROPOXYPH UR QL: NEGATIVE
PROT SERPL-MCNC: 6.5 G/DL (ref 5.7–8.2)
PROTHROMBIN TIME: 12 SECONDS (ref 11.2–14.3)
RBC # BLD AUTO: 5.15 10*6/MM3 (ref 4.2–5.76)
RH BLD: POSITIVE
SODIUM BLD-SCNC: 140 MMOL/L (ref 132–146)
T&S EXPIRATION DATE: NORMAL
TRICYCLICS UR QL SCN: NEGATIVE
WBC NRBC COR # BLD: 8.25 10*3/MM3 (ref 3.5–10.8)

## 2019-03-21 PROCEDURE — 85610 PROTHROMBIN TIME: CPT | Performed by: PHYSICIAN ASSISTANT

## 2019-03-21 PROCEDURE — 71046 X-RAY EXAM CHEST 2 VIEWS: CPT

## 2019-03-21 PROCEDURE — 86923 COMPATIBILITY TEST ELECTRIC: CPT

## 2019-03-21 PROCEDURE — 80053 COMPREHEN METABOLIC PANEL: CPT | Performed by: PHYSICIAN ASSISTANT

## 2019-03-21 PROCEDURE — 36415 COLL VENOUS BLD VENIPUNCTURE: CPT

## 2019-03-21 PROCEDURE — 85025 COMPLETE CBC W/AUTO DIFF WBC: CPT | Performed by: PHYSICIAN ASSISTANT

## 2019-03-21 PROCEDURE — 86850 RBC ANTIBODY SCREEN: CPT | Performed by: PHYSICIAN ASSISTANT

## 2019-03-21 PROCEDURE — 86900 BLOOD TYPING SEROLOGIC ABO: CPT | Performed by: PHYSICIAN ASSISTANT

## 2019-03-21 PROCEDURE — 86901 BLOOD TYPING SEROLOGIC RH(D): CPT | Performed by: PHYSICIAN ASSISTANT

## 2019-03-21 PROCEDURE — 85730 THROMBOPLASTIN TIME PARTIAL: CPT | Performed by: PHYSICIAN ASSISTANT

## 2019-03-21 PROCEDURE — 94010 BREATHING CAPACITY TEST: CPT

## 2019-03-21 PROCEDURE — 85576 BLOOD PLATELET AGGREGATION: CPT | Performed by: PHYSICIAN ASSISTANT

## 2019-03-21 PROCEDURE — 94010 BREATHING CAPACITY TEST: CPT | Performed by: INTERNAL MEDICINE

## 2019-03-21 PROCEDURE — 83036 HEMOGLOBIN GLYCOSYLATED A1C: CPT | Performed by: PHYSICIAN ASSISTANT

## 2019-03-21 PROCEDURE — 83735 ASSAY OF MAGNESIUM: CPT | Performed by: PHYSICIAN ASSISTANT

## 2019-03-21 PROCEDURE — 80306 DRUG TEST PRSMV INSTRMNT: CPT | Performed by: PHYSICIAN ASSISTANT

## 2019-03-21 RX ORDER — CHLORHEXIDINE GLUCONATE 500 MG/1
1 CLOTH TOPICAL EVERY 12 HOURS PRN
Status: ACTIVE | OUTPATIENT
Start: 2019-03-21

## 2019-03-21 RX ORDER — AZITHROMYCIN 250 MG/1
250 TABLET, FILM COATED ORAL DAILY
COMMUNITY
Start: 2019-03-19 | End: 2019-03-28 | Stop reason: HOSPADM

## 2019-03-21 RX ORDER — SODIUM CHLORIDE 0.9 % (FLUSH) 0.9 %
3-10 SYRINGE (ML) INJECTION AS NEEDED
Status: CANCELLED | OUTPATIENT
Start: 2019-03-21

## 2019-03-21 RX ORDER — ASPIRIN 325 MG
325 TABLET ORAL NIGHTLY
Status: SHIPPED | OUTPATIENT
Start: 2019-03-21 | End: 2019-03-22

## 2019-03-21 RX ORDER — CETIRIZINE HYDROCHLORIDE 10 MG/1
10 TABLET ORAL DAILY
Status: ON HOLD | COMMUNITY
End: 2022-10-02

## 2019-03-21 RX ORDER — SODIUM CHLORIDE 0.9 % (FLUSH) 0.9 %
3 SYRINGE (ML) INJECTION EVERY 12 HOURS SCHEDULED
Status: CANCELLED | OUTPATIENT
Start: 2019-03-21

## 2019-03-21 RX ORDER — ECHINACEA PURPUREA EXTRACT 125 MG
1 TABLET ORAL AS NEEDED
Status: ON HOLD | COMMUNITY
End: 2022-10-02

## 2019-03-21 RX ORDER — FAMOTIDINE 10 MG/ML
20 INJECTION, SOLUTION INTRAVENOUS ONCE
Status: CANCELLED | OUTPATIENT
Start: 2019-03-21 | End: 2019-03-21

## 2019-03-21 NOTE — PAT
Patient to apply Chlorhexadine wipes  to surgical area (as instructed) the night before procedure and the AM of procedure. Wipes provided.    Instructed patient to take 325 mg the night before heart surgery as per CT surgeon's order.  Patient and/or family verbalized understanding.    Patient/family provided a [facility] Heart Surgery book (if not already provided by the CT surgeon's office).  Encouraged patient and family to read the Heart Surgery book if they had not already done so.  Also completed Heart Surgery pre surgery checklist with patient.  Verified with patient that exercise handout was received, if not, then one was provided during PAT visit.      Education during PAT visit included general perioperative instructions that are routine for all surgical patients (PAT PASS, wipes, directions to pre-op,e tc.).  Additionally, a handout was provided and discussed that stressed the importance of Honesty, Incentive Spirometry use, Ambulation, and Pain control.  This handout also explained the tubes in place during immediate post op recovery.  Verbal instructions given as well.     Patient and/or family verbalized understanding of education and reinforcement was provided as needed.    Instructed patient to take 325 mg the night before heart surgery as per CT surgeon's order.  Patient and/or family verbalized understanding.        EKG 2/14/19    Patient currently taking Z pack for upper respiratory infection that was prescribed by Dr Apodaca on 3/19/19.  Patient denies fever/temperature.  Patient will take another dose on 3/22/19 am and there will be one dose remaining after surgery on 3/23/19.  Paged Adi Arauz and informed him of the antibiotic and Ha1c of 9 (which is done from 11 that cancelled his knee surgery in Jan 2019).  NO new orders other than that preop needs to check blood glucose level in the am.

## 2019-03-21 NOTE — DISCHARGE INSTRUCTIONS
The following information and instructions were given:    Do not eat, drink, smoke or chew gum after midnight the night before surgery. This also includes no mints.  Take all routine, prescribed medications including heart and blood pressure medicines with a sip of water unless otherwise instructed by your physician.   Do NOT take diabetic medication unless instructed by your physician.    If you were instructed to drink 20 ounces (or until full) of Gatorade or G2 (if diabetic) the morning of surgery, the Gatorade or G2 must be completed 1 hour before arrival time on the day of surgery .  No RED Gatorade or G2.      DO NOT shave for two days before your procedure.  Do not wear makeup.      DO NOT wear fingernail polish (gel/regular) and/or acrylic/artificial nails on the day of surgery.   If you have had a recent manicure and would rather not remove polish or artificial nails, then the minimum requirement is that the polish/artificial nails must be removed from the middle finger on each hand.      If you are having surgery/procedure on an upper extremity, then fingernail polish/artificial fingernails must be removed for surgery.  NO EXCEPTIONS.      If you are having surgery/procedure on a lower extremity, then toenail polish on both extremities must be removed for surgery.  NO EXCEPTIONS.    Remove all jewelry (advise to go to jeweler if unable to remove).  Jewelry, especially rings, can no longer be taped for surgery.    Leave anything you consider valuable at home.    Leave your suitcase in the car until after your surgery.    Bring the following with you the day of your procedure (when applicable)       -picture ID and insurance cards       -Co-pay/deductible required by insurance       -Medications in the original bottles (not a list) including all over-the-counter medications if not brought to PAT       -Copy of advance directive, living will or power of  documents if not brought to PAT       -CPAP or  BIPAP mask and tubing (do not bring machine)       -Skin prep instruction(s) sheet       -PAT Pass    Education booklet, brochure, handout or binder related to procedure given to patient.    When applicable, an ERAS booklet was given to patient.    Pain Control After Surgery handout given to patient.    Respirex use (handout given to patient) and pneumonia prevention education provided.    Signs and Symptoms of infection discussed.    DVT Prevention education given.  Stressing the importance of ambulation.    Please apply Chlorhexadine wipes to surgical area (if instructed) the night before procedure and the AM of procedure and document date/time of applications on skin prep instruction sheet.        Patient/family provided a [facility] Heart Surgery book (if not already provided by the CT surgeon's office).  Encouraged patient and family to read the Heart Surgery book if they had not already done so.  Also completed Heart Surgery pre surgery checklist with patient.  Verified with patient that exercise handout was received, if not, then one was provided during PAT visit.      Education during PAT visit included general perioperative instructions that are routine for all surgical patients (PAT PASS, wipes, directions to pre-op,e tc.).  Additionally, a handout was provided and discussed that stressed the importance of Honesty, Incentive Spirometry use, Ambulation, and Pain control.  This handout also explained the tubes in place during immediate post op recovery.  Verbal instructions given as well.     Patient and/or family verbalized understanding of education and reinforcement was provided as needed.    Instructed patient to take 325 mg the night before heart surgery as per CT surgeon's order.  Patient and/or family verbalized understanding.

## 2019-03-22 ENCOUNTER — APPOINTMENT (OUTPATIENT)
Dept: GENERAL RADIOLOGY | Facility: HOSPITAL | Age: 74
End: 2019-03-22

## 2019-03-22 ENCOUNTER — HOSPITAL ENCOUNTER (INPATIENT)
Facility: HOSPITAL | Age: 74
LOS: 6 days | Discharge: HOME | End: 2019-03-28
Attending: THORACIC SURGERY (CARDIOTHORACIC VASCULAR SURGERY) | Admitting: THORACIC SURGERY (CARDIOTHORACIC VASCULAR SURGERY)

## 2019-03-22 ENCOUNTER — ANESTHESIA (OUTPATIENT)
Dept: PERIOP | Facility: HOSPITAL | Age: 74
End: 2019-03-22

## 2019-03-22 DIAGNOSIS — Z74.09 IMPAIRED FUNCTIONAL MOBILITY, BALANCE, GAIT, AND ENDURANCE: Primary | ICD-10-CM

## 2019-03-22 DIAGNOSIS — I25.10 CORONARY ARTERY DISEASE INVOLVING NATIVE CORONARY ARTERY OF NATIVE HEART WITHOUT ANGINA PECTORIS: ICD-10-CM

## 2019-03-22 DIAGNOSIS — Z95.1 S/P CABG X 3: ICD-10-CM

## 2019-03-22 DIAGNOSIS — I25.118 CORONARY ARTERY DISEASE INVOLVING NATIVE CORONARY ARTERY OF NATIVE HEART WITH OTHER FORM OF ANGINA PECTORIS (HCC): ICD-10-CM

## 2019-03-22 PROBLEM — E03.9 HYPOTHYROIDISM: Status: ACTIVE | Noted: 2019-03-22

## 2019-03-22 PROBLEM — E78.5 HYPERLIPIDEMIA: Status: ACTIVE | Noted: 2019-03-22

## 2019-03-22 PROBLEM — I45.10 RBBB: Status: ACTIVE | Noted: 2019-03-22

## 2019-03-22 PROBLEM — E11.40 DIABETIC NEUROPATHY: Status: ACTIVE | Noted: 2019-03-22

## 2019-03-22 PROBLEM — IMO0002 DIABETES MELLITUS TYPE 2 WITH COMPLICATIONS, UNCONTROLLED: Status: ACTIVE | Noted: 2019-03-22

## 2019-03-22 PROBLEM — I10 HYPERTENSION: Status: ACTIVE | Noted: 2019-03-22

## 2019-03-22 PROBLEM — E55.9 VITAMIN D DEFICIENCY: Status: ACTIVE | Noted: 2019-03-22

## 2019-03-22 PROBLEM — N28.9 ACUTE RENAL INSUFFICIENCY: Status: ACTIVE | Noted: 2019-03-22

## 2019-03-22 LAB
ABO GROUP BLD: NORMAL
ACT BLD: 109 SECONDS (ref 82–152)
ACT BLD: 120 SECONDS (ref 82–152)
ACT BLD: 120 SECONDS (ref 82–152)
ACT BLD: 439 SECONDS (ref 82–152)
ACT BLD: 444 SECONDS (ref 82–152)
ACT BLD: 472 SECONDS (ref 82–152)
ALBUMIN SERPL-MCNC: 3.55 G/DL (ref 3.2–4.8)
ALBUMIN SERPL-MCNC: 3.71 G/DL (ref 3.2–4.8)
ANION GAP SERPL CALCULATED.3IONS-SCNC: 11 MMOL/L (ref 3–11)
ANION GAP SERPL CALCULATED.3IONS-SCNC: 8 MMOL/L (ref 3–11)
ANION GAP SERPL CALCULATED.3IONS-SCNC: 8 MMOL/L (ref 3–11)
APTT PPP: 32.7 SECONDS (ref 24–37)
ARTERIAL PATENCY WRIST A: ABNORMAL
ARTERIAL PATENCY WRIST A: ABNORMAL
ATMOSPHERIC PRESS: ABNORMAL MMHG
ATMOSPHERIC PRESS: ABNORMAL MMHG
BASE EXCESS BLDA CALC-SCNC: -0.4 MMOL/L (ref 0–2)
BASE EXCESS BLDA CALC-SCNC: -1 MMOL/L (ref -5–5)
BASE EXCESS BLDA CALC-SCNC: -1 MMOL/L (ref -5–5)
BASE EXCESS BLDA CALC-SCNC: -3 MMOL/L (ref -5–5)
BASE EXCESS BLDA CALC-SCNC: -3 MMOL/L (ref -5–5)
BASE EXCESS BLDA CALC-SCNC: -5 MMOL/L (ref -5–5)
BASE EXCESS BLDA CALC-SCNC: 0.3 MMOL/L (ref 0–2)
BDY SITE: ABNORMAL
BDY SITE: ABNORMAL
BODY TEMPERATURE: 37 C
BODY TEMPERATURE: 37 C
BUN BLD-MCNC: 23 MG/DL (ref 9–23)
BUN BLD-MCNC: 24 MG/DL (ref 9–23)
BUN BLD-MCNC: 26 MG/DL (ref 9–23)
BUN/CREAT SERPL: 20.5 (ref 7–25)
BUN/CREAT SERPL: 21.2 (ref 7–25)
BUN/CREAT SERPL: 24.3 (ref 7–25)
CA-I BLDA-SCNC: 0.94 MMOL/L (ref 1.2–1.32)
CA-I BLDA-SCNC: 1.03 MMOL/L (ref 1.2–1.32)
CA-I BLDA-SCNC: 1.16 MMOL/L (ref 1.2–1.32)
CA-I BLDA-SCNC: 1.21 MMOL/L (ref 1.2–1.32)
CA-I BLDA-SCNC: 1.49 MMOL/L (ref 1.2–1.32)
CA-I SERPL ISE-MCNC: 1.31 MMOL/L (ref 1.12–1.32)
CALCIUM SPEC-SCNC: 8.8 MG/DL (ref 8.7–10.4)
CALCIUM SPEC-SCNC: 8.9 MG/DL (ref 8.7–10.4)
CALCIUM SPEC-SCNC: 8.9 MG/DL (ref 8.7–10.4)
CHLORIDE SERPL-SCNC: 105 MMOL/L (ref 99–109)
CHLORIDE SERPL-SCNC: 106 MMOL/L (ref 99–109)
CHLORIDE SERPL-SCNC: 107 MMOL/L (ref 99–109)
CO2 BLDA-SCNC: 23 MMOL/L (ref 24–29)
CO2 BLDA-SCNC: 24 MMOL/L (ref 24–29)
CO2 BLDA-SCNC: 25 MMOL/L (ref 24–29)
CO2 BLDA-SCNC: 26 MMOL/L (ref 24–29)
CO2 BLDA-SCNC: 26.2 MMOL/L (ref 23–27)
CO2 BLDA-SCNC: 27.4 MMOL/L (ref 23–27)
CO2 BLDA-SCNC: 28 MMOL/L (ref 24–29)
CO2 SERPL-SCNC: 24 MMOL/L (ref 20–31)
CO2 SERPL-SCNC: 24 MMOL/L (ref 20–31)
CO2 SERPL-SCNC: 25 MMOL/L (ref 20–31)
COHGB MFR BLD: 0.7 % (ref 0–2)
COHGB MFR BLD: 0.9 % (ref 0–2)
CREAT BLD-MCNC: 1.07 MG/DL (ref 0.6–1.3)
CREAT BLD-MCNC: 1.12 MG/DL (ref 0.6–1.3)
CREAT BLD-MCNC: 1.13 MG/DL (ref 0.6–1.3)
DEPRECATED RDW RBC AUTO: 41.4 FL (ref 37–54)
DEPRECATED RDW RBC AUTO: 42.7 FL (ref 37–54)
ERYTHROCYTE [DISTWIDTH] IN BLOOD BY AUTOMATED COUNT: 13 % (ref 11.3–14.5)
ERYTHROCYTE [DISTWIDTH] IN BLOOD BY AUTOMATED COUNT: 13.1 % (ref 11.3–14.5)
GFR SERPL CREATININE-BSD FRML MDRD: 64 ML/MIN/1.73
GFR SERPL CREATININE-BSD FRML MDRD: 64 ML/MIN/1.73
GFR SERPL CREATININE-BSD FRML MDRD: 68 ML/MIN/1.73
GLUCOSE BLD-MCNC: 143 MG/DL (ref 70–100)
GLUCOSE BLD-MCNC: 164 MG/DL (ref 70–100)
GLUCOSE BLD-MCNC: 232 MG/DL (ref 70–100)
GLUCOSE BLDC GLUCOMTR-MCNC: 134 MG/DL (ref 70–130)
GLUCOSE BLDC GLUCOMTR-MCNC: 141 MG/DL (ref 70–130)
GLUCOSE BLDC GLUCOMTR-MCNC: 144 MG/DL (ref 70–130)
GLUCOSE BLDC GLUCOMTR-MCNC: 147 MG/DL (ref 70–130)
GLUCOSE BLDC GLUCOMTR-MCNC: 149 MG/DL (ref 70–130)
GLUCOSE BLDC GLUCOMTR-MCNC: 154 MG/DL (ref 70–130)
GLUCOSE BLDC GLUCOMTR-MCNC: 163 MG/DL (ref 70–130)
GLUCOSE BLDC GLUCOMTR-MCNC: 168 MG/DL (ref 70–130)
GLUCOSE BLDC GLUCOMTR-MCNC: 177 MG/DL (ref 70–130)
GLUCOSE BLDC GLUCOMTR-MCNC: 196 MG/DL (ref 70–130)
GLUCOSE BLDC GLUCOMTR-MCNC: 202 MG/DL (ref 70–130)
GLUCOSE BLDC GLUCOMTR-MCNC: 226 MG/DL (ref 70–130)
GLUCOSE BLDC GLUCOMTR-MCNC: 229 MG/DL (ref 70–130)
GLUCOSE BLDC GLUCOMTR-MCNC: 231 MG/DL (ref 70–130)
GLUCOSE BLDC GLUCOMTR-MCNC: 245 MG/DL (ref 70–130)
HCO3 BLDA-SCNC: 21.9 MMOL/L (ref 22–26)
HCO3 BLDA-SCNC: 22.4 MMOL/L (ref 22–26)
HCO3 BLDA-SCNC: 23.8 MMOL/L (ref 22–26)
HCO3 BLDA-SCNC: 24.4 MMOL/L (ref 22–26)
HCO3 BLDA-SCNC: 24.9 MMOL/L (ref 20–26)
HCO3 BLDA-SCNC: 26 MMOL/L (ref 20–26)
HCO3 BLDA-SCNC: 26.3 MMOL/L (ref 22–26)
HCT VFR BLD AUTO: 28.1 % (ref 38.9–50.9)
HCT VFR BLD AUTO: 29.1 % (ref 38.9–50.9)
HCT VFR BLD AUTO: 29.4 % (ref 38.9–50.9)
HCT VFR BLD CALC: 30.7 %
HCT VFR BLD CALC: 30.9 %
HCT VFR BLDA CALC: 25 % (ref 38–51)
HCT VFR BLDA CALC: 27 % (ref 38–51)
HCT VFR BLDA CALC: 30 % (ref 38–51)
HCT VFR BLDA CALC: 35 % (ref 38–51)
HCT VFR BLDA CALC: 40 % (ref 38–51)
HGB BLD-MCNC: 9.7 G/DL (ref 13.1–17.5)
HGB BLD-MCNC: 9.8 G/DL (ref 13.1–17.5)
HGB BLD-MCNC: 9.8 G/DL (ref 13.1–17.5)
HGB BLDA-MCNC: 10 G/DL (ref 13.5–17.5)
HGB BLDA-MCNC: 10.1 G/DL (ref 13.5–17.5)
HGB BLDA-MCNC: 10.2 G/DL (ref 12–17)
HGB BLDA-MCNC: 11.9 G/DL (ref 12–17)
HGB BLDA-MCNC: 13.6 G/DL (ref 12–17)
HGB BLDA-MCNC: 8.5 G/DL (ref 12–17)
HGB BLDA-MCNC: 9.2 G/DL (ref 12–17)
HOROWITZ INDEX BLD+IHG-RTO: 100 %
HOROWITZ INDEX BLD+IHG-RTO: 40 %
INR PPP: 1.34 (ref 0.85–1.16)
MAGNESIUM SERPL-MCNC: 2.2 MG/DL (ref 1.3–2.7)
MAGNESIUM SERPL-MCNC: 2.3 MG/DL (ref 1.3–2.7)
MCH RBC QN AUTO: 29.3 PG (ref 27–31)
MCH RBC QN AUTO: 30.1 PG (ref 27–31)
MCHC RBC AUTO-ENTMCNC: 33.3 G/DL (ref 32–36)
MCHC RBC AUTO-ENTMCNC: 34.9 G/DL (ref 32–36)
MCV RBC AUTO: 86.2 FL (ref 80–99)
MCV RBC AUTO: 87.8 FL (ref 80–99)
METHGB BLD QL: 1.4 % (ref 0–1.5)
METHGB BLD QL: 1.7 % (ref 0–1.5)
MODALITY: ABNORMAL
MODALITY: ABNORMAL
NOTE: ABNORMAL
NOTE: ABNORMAL
OXYHGB MFR BLDV: 93.7 % (ref 94–99)
OXYHGB MFR BLDV: 97.1 % (ref 94–99)
PCO2 BLDA: 35.2 MM HG (ref 35–45)
PCO2 BLDA: 41.1 MM HG (ref 35–45)
PCO2 BLDA: 42.6 MM HG
PCO2 BLDA: 46 MM HG
PCO2 BLDA: 50.7 MM HG (ref 35–45)
PCO2 BLDA: 52 MM HG (ref 35–45)
PCO2 BLDA: 57 MM HG (ref 35–45)
PCO2 TEMP ADJ BLD: 42.6 MM HG (ref 35–48)
PCO2 TEMP ADJ BLD: 46 MM HG (ref 35–48)
PH BLDA: 7.24 PH UNITS (ref 7.35–7.6)
PH BLDA: 7.27 PH UNITS (ref 7.35–7.6)
PH BLDA: 7.28 PH UNITS (ref 7.35–7.6)
PH BLDA: 7.36 PH UNITS (ref 7.35–7.45)
PH BLDA: 7.38 PH UNITS (ref 7.35–7.45)
PH BLDA: 7.38 PH UNITS (ref 7.35–7.6)
PH BLDA: 7.4 PH UNITS (ref 7.35–7.6)
PH, TEMP CORRECTED: 7.36 PH UNITS
PH, TEMP CORRECTED: 7.38 PH UNITS
PHOSPHATE SERPL-MCNC: 3.4 MG/DL (ref 2.4–5.1)
PHOSPHATE SERPL-MCNC: 4.1 MG/DL (ref 2.4–5.1)
PLATELET # BLD AUTO: 200 10*3/MM3 (ref 150–450)
PLATELET # BLD AUTO: 208 10*3/MM3 (ref 150–450)
PMV BLD AUTO: 10.2 FL (ref 6–12)
PMV BLD AUTO: 10.6 FL (ref 6–12)
PO2 BLDA: 158 MMHG (ref 80–105)
PO2 BLDA: 175 MMHG (ref 80–105)
PO2 BLDA: 186 MMHG (ref 80–105)
PO2 BLDA: 257 MM HG (ref 83–108)
PO2 BLDA: 267 MMHG (ref 80–105)
PO2 BLDA: 364 MMHG (ref 80–105)
PO2 BLDA: 79.1 MM HG (ref 83–108)
PO2 TEMP ADJ BLD: 257 MM HG (ref 83–108)
PO2 TEMP ADJ BLD: 79.1 MM HG (ref 83–108)
POTASSIUM BLD-SCNC: 3.7 MMOL/L (ref 3.5–5.5)
POTASSIUM BLD-SCNC: 3.8 MMOL/L (ref 3.5–5.5)
POTASSIUM BLD-SCNC: 3.8 MMOL/L (ref 3.5–5.5)
POTASSIUM BLDA-SCNC: 3.7 MMOL/L (ref 3.5–4.9)
POTASSIUM BLDA-SCNC: 3.7 MMOL/L (ref 3.5–4.9)
POTASSIUM BLDA-SCNC: 3.8 MMOL/L (ref 3.5–4.9)
POTASSIUM BLDA-SCNC: 4.1 MMOL/L (ref 3.5–4.9)
POTASSIUM BLDA-SCNC: 4.3 MMOL/L (ref 3.5–4.9)
PROTHROMBIN TIME: 15.9 SECONDS (ref 11.2–14.3)
RBC # BLD AUTO: 3.26 10*6/MM3 (ref 4.2–5.76)
RBC # BLD AUTO: 3.35 10*6/MM3 (ref 4.2–5.76)
RH BLD: POSITIVE
SAO2 % BLDA: 100 % (ref 95–98)
SAO2 % BLDA: 99 % (ref 95–98)
SODIUM BLD-SCNC: 137 MMOL/L (ref 132–146)
SODIUM BLD-SCNC: 139 MMOL/L (ref 132–146)
SODIUM BLD-SCNC: 142 MMOL/L (ref 132–146)
SODIUM BLDA-SCNC: 135 MMOL/L (ref 138–146)
SODIUM BLDA-SCNC: 139 MMOL/L (ref 138–146)
SODIUM BLDA-SCNC: 139 MMOL/L (ref 138–146)
SODIUM BLDA-SCNC: 140 MMOL/L (ref 138–146)
SODIUM BLDA-SCNC: 140 MMOL/L (ref 138–146)
VENTILATOR MODE: ABNORMAL
VENTILATOR MODE: ABNORMAL
WBC NRBC COR # BLD: 14.24 10*3/MM3 (ref 3.5–10.8)
WBC NRBC COR # BLD: 19.21 10*3/MM3 (ref 3.5–10.8)

## 2019-03-22 PROCEDURE — 82947 ASSAY GLUCOSE BLOOD QUANT: CPT

## 2019-03-22 PROCEDURE — 25010000002 AMIODARONE IN DEXTROSE 5% 360-4.14 MG/200ML-% SOLUTION: Performed by: THORACIC SURGERY (CARDIOTHORACIC VASCULAR SURGERY)

## 2019-03-22 PROCEDURE — 33533 CABG ARTERIAL SINGLE: CPT | Performed by: THORACIC SURGERY (CARDIOTHORACIC VASCULAR SURGERY)

## 2019-03-22 PROCEDURE — 94799 UNLISTED PULMONARY SVC/PX: CPT

## 2019-03-22 PROCEDURE — 82330 ASSAY OF CALCIUM: CPT

## 2019-03-22 PROCEDURE — 85027 COMPLETE CBC AUTOMATED: CPT | Performed by: PHYSICIAN ASSISTANT

## 2019-03-22 PROCEDURE — 25010000002 HEPARIN (PORCINE) PER 1000 UNITS: Performed by: ANESTHESIOLOGY

## 2019-03-22 PROCEDURE — P9041 ALBUMIN (HUMAN),5%, 50ML: HCPCS | Performed by: PHYSICIAN ASSISTANT

## 2019-03-22 PROCEDURE — 84132 ASSAY OF SERUM POTASSIUM: CPT

## 2019-03-22 PROCEDURE — 82803 BLOOD GASES ANY COMBINATION: CPT

## 2019-03-22 PROCEDURE — 82805 BLOOD GASES W/O2 SATURATION: CPT

## 2019-03-22 PROCEDURE — C1894 INTRO/SHEATH, NON-LASER: HCPCS | Performed by: THORACIC SURGERY (CARDIOTHORACIC VASCULAR SURGERY)

## 2019-03-22 PROCEDURE — 36430 TRANSFUSION BLD/BLD COMPNT: CPT

## 2019-03-22 PROCEDURE — 86900 BLOOD TYPING SEROLOGIC ABO: CPT

## 2019-03-22 PROCEDURE — A4648 IMPLANTABLE TISSUE MARKER: HCPCS | Performed by: THORACIC SURGERY (CARDIOTHORACIC VASCULAR SURGERY)

## 2019-03-22 PROCEDURE — 84295 ASSAY OF SERUM SODIUM: CPT

## 2019-03-22 PROCEDURE — P9041 ALBUMIN (HUMAN),5%, 50ML: HCPCS

## 2019-03-22 PROCEDURE — 85610 PROTHROMBIN TIME: CPT | Performed by: PHYSICIAN ASSISTANT

## 2019-03-22 PROCEDURE — 85347 COAGULATION TIME ACTIVATED: CPT

## 2019-03-22 PROCEDURE — 93005 ELECTROCARDIOGRAM TRACING: CPT | Performed by: PHYSICIAN ASSISTANT

## 2019-03-22 PROCEDURE — 25010000002 PROPOFOL 1000 MG/ML EMULSION: Performed by: THORACIC SURGERY (CARDIOTHORACIC VASCULAR SURGERY)

## 2019-03-22 PROCEDURE — 25010000002 PROTAMINE SULFATE PER 10 MG: Performed by: PHYSICIAN ASSISTANT

## 2019-03-22 PROCEDURE — 33518 CABG ARTERY-VEIN TWO: CPT | Performed by: THORACIC SURGERY (CARDIOTHORACIC VASCULAR SURGERY)

## 2019-03-22 PROCEDURE — 85014 HEMATOCRIT: CPT

## 2019-03-22 PROCEDURE — 80069 RENAL FUNCTION PANEL: CPT | Performed by: PHYSICIAN ASSISTANT

## 2019-03-22 PROCEDURE — 25010000002 MIDAZOLAM PER 1 MG: Performed by: ANESTHESIOLOGY

## 2019-03-22 PROCEDURE — P9041 ALBUMIN (HUMAN),5%, 50ML: HCPCS | Performed by: ANESTHESIOLOGY

## 2019-03-22 PROCEDURE — 86901 BLOOD TYPING SEROLOGIC RH(D): CPT

## 2019-03-22 PROCEDURE — 25010000002 MORPHINE PER 10 MG: Performed by: THORACIC SURGERY (CARDIOTHORACIC VASCULAR SURGERY)

## 2019-03-22 PROCEDURE — P9035 PLATELET PHERES LEUKOREDUCED: HCPCS

## 2019-03-22 PROCEDURE — 25810000003 DEXTROSE 5 % WITH KCL 20 MEQ 20-5 MEQ/L-% SOLUTION: Performed by: PHYSICIAN ASSISTANT

## 2019-03-22 PROCEDURE — 63710000001 INSULIN REGULAR HUMAN PER 5 UNITS: Performed by: THORACIC SURGERY (CARDIOTHORACIC VASCULAR SURGERY)

## 2019-03-22 PROCEDURE — 80048 BASIC METABOLIC PNL TOTAL CA: CPT | Performed by: THORACIC SURGERY (CARDIOTHORACIC VASCULAR SURGERY)

## 2019-03-22 PROCEDURE — 25010000002 PROTAMINE SULFATE PER 10 MG: Performed by: ANESTHESIOLOGY

## 2019-03-22 PROCEDURE — 25010000002 FENTANYL CITRATE (PF) 100 MCG/2ML SOLUTION: Performed by: THORACIC SURGERY (CARDIOTHORACIC VASCULAR SURGERY)

## 2019-03-22 PROCEDURE — 83735 ASSAY OF MAGNESIUM: CPT | Performed by: PHYSICIAN ASSISTANT

## 2019-03-22 PROCEDURE — 99291 CRITICAL CARE FIRST HOUR: CPT | Performed by: INTERNAL MEDICINE

## 2019-03-22 PROCEDURE — 33508 ENDOSCOPIC VEIN HARVEST: CPT | Performed by: THORACIC SURGERY (CARDIOTHORACIC VASCULAR SURGERY)

## 2019-03-22 PROCEDURE — 71045 X-RAY EXAM CHEST 1 VIEW: CPT

## 2019-03-22 PROCEDURE — 25010000002 ALBUMIN HUMAN 5% PER 50 ML: Performed by: ANESTHESIOLOGY

## 2019-03-22 PROCEDURE — 5A1221Z PERFORMANCE OF CARDIAC OUTPUT, CONTINUOUS: ICD-10-PCS | Performed by: THORACIC SURGERY (CARDIOTHORACIC VASCULAR SURGERY)

## 2019-03-22 PROCEDURE — 85018 HEMOGLOBIN: CPT | Performed by: THORACIC SURGERY (CARDIOTHORACIC VASCULAR SURGERY)

## 2019-03-22 PROCEDURE — 25010000002 ALBUMIN HUMAN 5% PER 50 ML: Performed by: PHYSICIAN ASSISTANT

## 2019-03-22 PROCEDURE — 25010000002 ALBUMIN HUMAN 5% PER 50 ML

## 2019-03-22 PROCEDURE — C1751 CATH, INF, PER/CENT/MIDLINE: HCPCS | Performed by: ANESTHESIOLOGY

## 2019-03-22 PROCEDURE — 93010 ELECTROCARDIOGRAM REPORT: CPT | Performed by: INTERNAL MEDICINE

## 2019-03-22 PROCEDURE — 94002 VENT MGMT INPAT INIT DAY: CPT

## 2019-03-22 PROCEDURE — 25010000002 CEFUROXIME PER 750 MG: Performed by: ANESTHESIOLOGY

## 2019-03-22 PROCEDURE — 25010000003 CEFAZOLIN IN DEXTROSE 2-4 GM/100ML-% SOLUTION: Performed by: THORACIC SURGERY (CARDIOTHORACIC VASCULAR SURGERY)

## 2019-03-22 PROCEDURE — 25010000002 PAPAVERINE PER 60 MG: Performed by: THORACIC SURGERY (CARDIOTHORACIC VASCULAR SURGERY)

## 2019-03-22 PROCEDURE — 25010000002 HEPARIN (PORCINE) PER 1000 UNITS: Performed by: THORACIC SURGERY (CARDIOTHORACIC VASCULAR SURGERY)

## 2019-03-22 PROCEDURE — 06JY4ZZ INSPECTION OF LOWER VEIN, PERCUTANEOUS ENDOSCOPIC APPROACH: ICD-10-PCS | Performed by: THORACIC SURGERY (CARDIOTHORACIC VASCULAR SURGERY)

## 2019-03-22 PROCEDURE — 02100Z9 BYPASS CORONARY ARTERY, ONE ARTERY FROM LEFT INTERNAL MAMMARY, OPEN APPROACH: ICD-10-PCS | Performed by: THORACIC SURGERY (CARDIOTHORACIC VASCULAR SURGERY)

## 2019-03-22 PROCEDURE — 25010000002 PROPOFOL 1000 MG/ML EMULSION: Performed by: ANESTHESIOLOGY

## 2019-03-22 PROCEDURE — 82330 ASSAY OF CALCIUM: CPT | Performed by: PHYSICIAN ASSISTANT

## 2019-03-22 PROCEDURE — 25010000003 CEFAZOLIN IN DEXTROSE 2-4 GM/100ML-% SOLUTION

## 2019-03-22 PROCEDURE — 25010000002 AMIODARONE IN DEXTROSE 5% 360-4.14 MG/200ML-% SOLUTION: Performed by: ANESTHESIOLOGY

## 2019-03-22 PROCEDURE — 021109W BYPASS CORONARY ARTERY, TWO ARTERIES FROM AORTA WITH AUTOLOGOUS VENOUS TISSUE, OPEN APPROACH: ICD-10-PCS | Performed by: THORACIC SURGERY (CARDIOTHORACIC VASCULAR SURGERY)

## 2019-03-22 PROCEDURE — 25010000002 ONDANSETRON PER 1 MG: Performed by: PHYSICIAN ASSISTANT

## 2019-03-22 PROCEDURE — C1751 CATH, INF, PER/CENT/MIDLINE: HCPCS | Performed by: THORACIC SURGERY (CARDIOTHORACIC VASCULAR SURGERY)

## 2019-03-22 PROCEDURE — 85730 THROMBOPLASTIN TIME PARTIAL: CPT | Performed by: PHYSICIAN ASSISTANT

## 2019-03-22 PROCEDURE — 06BP0ZZ EXCISION OF RIGHT SAPHENOUS VEIN, OPEN APPROACH: ICD-10-PCS | Performed by: THORACIC SURGERY (CARDIOTHORACIC VASCULAR SURGERY)

## 2019-03-22 PROCEDURE — 85014 HEMATOCRIT: CPT | Performed by: THORACIC SURGERY (CARDIOTHORACIC VASCULAR SURGERY)

## 2019-03-22 DEVICE — DISK-SHAPED STYLE, SILICONE (1 PER STERILE PKG)
Type: IMPLANTABLE DEVICE | Site: HEART | Status: FUNCTIONAL
Brand: SCANLAN® RADIOMARK® GRAFT MARKERS

## 2019-03-22 RX ORDER — MIDAZOLAM HYDROCHLORIDE 1 MG/ML
INJECTION INTRAMUSCULAR; INTRAVENOUS AS NEEDED
Status: DISCONTINUED | OUTPATIENT
Start: 2019-03-22 | End: 2019-03-22 | Stop reason: SURG

## 2019-03-22 RX ORDER — ALBUTEROL SULFATE 2.5 MG/3ML
2.5 SOLUTION RESPIRATORY (INHALATION) EVERY 4 HOURS PRN
Status: ACTIVE | OUTPATIENT
Start: 2019-03-22 | End: 2019-03-23

## 2019-03-22 RX ORDER — BISACODYL 10 MG
10 SUPPOSITORY, RECTAL RECTAL DAILY PRN
Status: DISCONTINUED | OUTPATIENT
Start: 2019-03-23 | End: 2019-03-26

## 2019-03-22 RX ORDER — ALBUMIN, HUMAN INJ 5% 5 %
SOLUTION INTRAVENOUS
Status: COMPLETED
Start: 2019-03-22 | End: 2019-03-22

## 2019-03-22 RX ORDER — DOBUTAMINE HYDROCHLORIDE 100 MG/100ML
2-20 INJECTION INTRAVENOUS CONTINUOUS PRN
Status: DISCONTINUED | OUTPATIENT
Start: 2019-03-22 | End: 2019-03-23

## 2019-03-22 RX ORDER — POTASSIUM CHLORIDE 1.5 G/1.77G
40 POWDER, FOR SOLUTION ORAL AS NEEDED
Status: DISCONTINUED | OUTPATIENT
Start: 2019-03-22 | End: 2019-03-26

## 2019-03-22 RX ORDER — DOPAMINE HYDROCHLORIDE 160 MG/100ML
2-20 INJECTION, SOLUTION INTRAVENOUS CONTINUOUS PRN
Status: DISCONTINUED | OUTPATIENT
Start: 2019-03-22 | End: 2019-03-23

## 2019-03-22 RX ORDER — PROTAMINE SULFATE 10 MG/ML
50 INJECTION, SOLUTION INTRAVENOUS ONCE
Status: COMPLETED | OUTPATIENT
Start: 2019-03-22 | End: 2019-03-22

## 2019-03-22 RX ORDER — MAGNESIUM SULFATE HEPTAHYDRATE 40 MG/ML
2 INJECTION, SOLUTION INTRAVENOUS AS NEEDED
Status: DISCONTINUED | OUTPATIENT
Start: 2019-03-22 | End: 2019-03-26

## 2019-03-22 RX ORDER — PROTAMINE SULFATE 10 MG/ML
INJECTION, SOLUTION INTRAVENOUS AS NEEDED
Status: DISCONTINUED | OUTPATIENT
Start: 2019-03-22 | End: 2019-03-22 | Stop reason: SURG

## 2019-03-22 RX ORDER — SODIUM CHLORIDE 0.9 % (FLUSH) 0.9 %
30 SYRINGE (ML) INJECTION ONCE AS NEEDED
Status: DISCONTINUED | OUTPATIENT
Start: 2019-03-22 | End: 2019-03-25

## 2019-03-22 RX ORDER — NITROGLYCERIN 20 MG/100ML
5-200 INJECTION INTRAVENOUS CONTINUOUS PRN
Status: DISCONTINUED | OUTPATIENT
Start: 2019-03-22 | End: 2019-03-23

## 2019-03-22 RX ORDER — PROTAMINE SULFATE 10 MG/ML
INJECTION, SOLUTION INTRAVENOUS
Status: DISPENSED
Start: 2019-03-22 | End: 2019-03-22

## 2019-03-22 RX ORDER — NOREPINEPHRINE BIT/0.9 % NACL 8 MG/250ML
.02-.3 INFUSION BOTTLE (ML) INTRAVENOUS CONTINUOUS PRN
Status: DISCONTINUED | OUTPATIENT
Start: 2019-03-22 | End: 2019-03-23

## 2019-03-22 RX ORDER — POTASSIUM CHLORIDE 29.8 MG/ML
20 INJECTION INTRAVENOUS
Status: DISCONTINUED | OUTPATIENT
Start: 2019-03-22 | End: 2019-03-25

## 2019-03-22 RX ORDER — DEXMEDETOMIDINE HYDROCHLORIDE 4 UG/ML
.2-1.5 INJECTION, SOLUTION INTRAVENOUS CONTINUOUS PRN
Status: DISCONTINUED | OUTPATIENT
Start: 2019-03-22 | End: 2019-03-23

## 2019-03-22 RX ORDER — AMINOCAPROIC ACID 250 MG/ML
INJECTION, SOLUTION INTRAVENOUS AS NEEDED
Status: DISCONTINUED | OUTPATIENT
Start: 2019-03-22 | End: 2019-03-22 | Stop reason: SURG

## 2019-03-22 RX ORDER — CEFAZOLIN SODIUM 2 G/100ML
2 INJECTION, SOLUTION INTRAVENOUS EVERY 8 HOURS
Status: COMPLETED | OUTPATIENT
Start: 2019-03-22 | End: 2019-03-23

## 2019-03-22 RX ORDER — ACETAMINOPHEN 325 MG/1
650 TABLET ORAL EVERY 4 HOURS PRN
Status: DISCONTINUED | OUTPATIENT
Start: 2019-03-22 | End: 2019-03-22 | Stop reason: HOSPADM

## 2019-03-22 RX ORDER — HYDROCODONE BITARTRATE AND ACETAMINOPHEN 7.5; 325 MG/1; MG/1
1 TABLET ORAL EVERY 4 HOURS PRN
Status: DISCONTINUED | OUTPATIENT
Start: 2019-03-22 | End: 2019-03-25

## 2019-03-22 RX ORDER — PHENYLEPHRINE HCL IN 0.9% NACL 0.5 MG/5ML
.5-3 SYRINGE (ML) INTRAVENOUS CONTINUOUS PRN
Status: DISCONTINUED | OUTPATIENT
Start: 2019-03-22 | End: 2019-03-23

## 2019-03-22 RX ORDER — SODIUM CHLORIDE 9 MG/ML
INJECTION, SOLUTION INTRAVENOUS CONTINUOUS PRN
Status: DISCONTINUED | OUTPATIENT
Start: 2019-03-22 | End: 2019-03-22 | Stop reason: SURG

## 2019-03-22 RX ORDER — CALCIUM CHLORIDE 100 MG/ML
INJECTION INTRAVENOUS; INTRAVENTRICULAR AS NEEDED
Status: DISCONTINUED | OUTPATIENT
Start: 2019-03-22 | End: 2019-03-22 | Stop reason: SURG

## 2019-03-22 RX ORDER — LIDOCAINE HYDROCHLORIDE 20 MG/ML
INJECTION, SOLUTION INFILTRATION; PERINEURAL AS NEEDED
Status: DISCONTINUED | OUTPATIENT
Start: 2019-03-22 | End: 2019-03-22 | Stop reason: SURG

## 2019-03-22 RX ORDER — POTASSIUM CHLORIDE 750 MG/1
40 CAPSULE, EXTENDED RELEASE ORAL AS NEEDED
Status: DISCONTINUED | OUTPATIENT
Start: 2019-03-22 | End: 2019-03-26

## 2019-03-22 RX ORDER — POTASSIUM CHLORIDE, DEXTROSE MONOHYDRATE 150; 5 MG/100ML; G/100ML
30 INJECTION, SOLUTION INTRAVENOUS CONTINUOUS
Status: DISCONTINUED | OUTPATIENT
Start: 2019-03-22 | End: 2019-03-25

## 2019-03-22 RX ORDER — CEFAZOLIN SODIUM 2 G/100ML
2 INJECTION, SOLUTION INTRAVENOUS ONCE
Status: DISCONTINUED | OUTPATIENT
Start: 2019-03-22 | End: 2019-03-24

## 2019-03-22 RX ORDER — CHLORHEXIDINE GLUCONATE 0.12 MG/ML
15 RINSE ORAL ONCE
Status: COMPLETED | OUTPATIENT
Start: 2019-03-22 | End: 2019-03-22

## 2019-03-22 RX ORDER — MEPERIDINE HYDROCHLORIDE 25 MG/ML
25 INJECTION INTRAMUSCULAR; INTRAVENOUS; SUBCUTANEOUS EVERY 4 HOURS PRN
Status: DISCONTINUED | OUTPATIENT
Start: 2019-03-22 | End: 2019-03-25

## 2019-03-22 RX ORDER — GLYCOPYRROLATE 0.2 MG/ML
INJECTION INTRAMUSCULAR; INTRAVENOUS AS NEEDED
Status: DISCONTINUED | OUTPATIENT
Start: 2019-03-22 | End: 2019-03-22 | Stop reason: SURG

## 2019-03-22 RX ORDER — LIDOCAINE HYDROCHLORIDE 10 MG/ML
0.5 INJECTION, SOLUTION EPIDURAL; INFILTRATION; INTRACAUDAL; PERINEURAL ONCE AS NEEDED
Status: COMPLETED | OUTPATIENT
Start: 2019-03-22 | End: 2019-03-22

## 2019-03-22 RX ORDER — ASPIRIN 325 MG
325 TABLET ORAL ONCE
Status: DISCONTINUED | OUTPATIENT
Start: 2019-03-22 | End: 2019-03-25

## 2019-03-22 RX ORDER — ROCURONIUM BROMIDE 10 MG/ML
INJECTION, SOLUTION INTRAVENOUS AS NEEDED
Status: DISCONTINUED | OUTPATIENT
Start: 2019-03-22 | End: 2019-03-22 | Stop reason: SURG

## 2019-03-22 RX ORDER — FAMOTIDINE 20 MG/1
20 TABLET, FILM COATED ORAL 2 TIMES DAILY
Status: DISCONTINUED | OUTPATIENT
Start: 2019-03-22 | End: 2019-03-28 | Stop reason: HOSPADM

## 2019-03-22 RX ORDER — SENNA AND DOCUSATE SODIUM 50; 8.6 MG/1; MG/1
2 TABLET, FILM COATED ORAL 2 TIMES DAILY
Status: DISCONTINUED | OUTPATIENT
Start: 2019-03-22 | End: 2019-03-28 | Stop reason: HOSPADM

## 2019-03-22 RX ORDER — SODIUM CHLORIDE 9 MG/ML
30 INJECTION, SOLUTION INTRAVENOUS CONTINUOUS PRN
Status: DISCONTINUED | OUTPATIENT
Start: 2019-03-22 | End: 2019-03-25

## 2019-03-22 RX ORDER — MORPHINE SULFATE 2 MG/ML
2 INJECTION, SOLUTION INTRAMUSCULAR; INTRAVENOUS
Status: DISCONTINUED | OUTPATIENT
Start: 2019-03-22 | End: 2019-03-26

## 2019-03-22 RX ORDER — VECURONIUM BROMIDE 1 MG/ML
INJECTION, POWDER, LYOPHILIZED, FOR SOLUTION INTRAVENOUS AS NEEDED
Status: DISCONTINUED | OUTPATIENT
Start: 2019-03-22 | End: 2019-03-22 | Stop reason: SURG

## 2019-03-22 RX ORDER — OXYCODONE HYDROCHLORIDE AND ACETAMINOPHEN 5; 325 MG/1; MG/1
2 TABLET ORAL EVERY 4 HOURS PRN
Status: DISCONTINUED | OUTPATIENT
Start: 2019-03-22 | End: 2019-03-26

## 2019-03-22 RX ORDER — ETOMIDATE 2 MG/ML
INJECTION INTRAVENOUS AS NEEDED
Status: DISCONTINUED | OUTPATIENT
Start: 2019-03-22 | End: 2019-03-22 | Stop reason: SURG

## 2019-03-22 RX ORDER — BISACODYL 5 MG/1
10 TABLET, DELAYED RELEASE ORAL DAILY PRN
Status: DISCONTINUED | OUTPATIENT
Start: 2019-03-22 | End: 2019-03-28 | Stop reason: HOSPADM

## 2019-03-22 RX ORDER — SUFENTANIL CITRATE 50 UG/ML
INJECTION EPIDURAL; INTRAVENOUS AS NEEDED
Status: DISCONTINUED | OUTPATIENT
Start: 2019-03-22 | End: 2019-03-22 | Stop reason: SURG

## 2019-03-22 RX ORDER — METOPROLOL TARTRATE 5 MG/5ML
2.5 INJECTION INTRAVENOUS EVERY 6 HOURS SCHEDULED
Status: ACTIVE | OUTPATIENT
Start: 2019-03-22 | End: 2019-03-23

## 2019-03-22 RX ORDER — SODIUM CHLORIDE 9 MG/ML
INJECTION, SOLUTION INTRAVENOUS AS NEEDED
Status: DISCONTINUED | OUTPATIENT
Start: 2019-03-22 | End: 2019-03-22 | Stop reason: HOSPADM

## 2019-03-22 RX ORDER — HEPARIN SODIUM 1000 [USP'U]/ML
INJECTION, SOLUTION INTRAVENOUS; SUBCUTANEOUS AS NEEDED
Status: DISCONTINUED | OUTPATIENT
Start: 2019-03-22 | End: 2019-03-22 | Stop reason: SURG

## 2019-03-22 RX ORDER — ACETAMINOPHEN 325 MG/1
650 TABLET ORAL EVERY 4 HOURS PRN
Status: DISCONTINUED | OUTPATIENT
Start: 2019-03-22 | End: 2019-03-28 | Stop reason: HOSPADM

## 2019-03-22 RX ORDER — ATORVASTATIN CALCIUM 40 MG/1
40 TABLET, FILM COATED ORAL NIGHTLY
Status: DISCONTINUED | OUTPATIENT
Start: 2019-03-22 | End: 2019-03-28 | Stop reason: HOSPADM

## 2019-03-22 RX ORDER — NITROGLYCERIN 0.4 MG/1
0.4 TABLET SUBLINGUAL
Status: DISCONTINUED | OUTPATIENT
Start: 2019-03-22 | End: 2019-03-22 | Stop reason: HOSPADM

## 2019-03-22 RX ORDER — PAPAVERINE HYDROCHLORIDE 30 MG/ML
INJECTION INTRAMUSCULAR; INTRAVENOUS AS NEEDED
Status: DISCONTINUED | OUTPATIENT
Start: 2019-03-22 | End: 2019-03-22 | Stop reason: HOSPADM

## 2019-03-22 RX ORDER — NALOXONE HYDROCHLORIDE 0.4 MG/ML
0.2 INJECTION, SOLUTION INTRAMUSCULAR; INTRAVENOUS; SUBCUTANEOUS
Status: DISCONTINUED | OUTPATIENT
Start: 2019-03-22 | End: 2019-03-28 | Stop reason: HOSPADM

## 2019-03-22 RX ORDER — MAGNESIUM SULFATE HEPTAHYDRATE 40 MG/ML
4 INJECTION, SOLUTION INTRAVENOUS AS NEEDED
Status: DISCONTINUED | OUTPATIENT
Start: 2019-03-22 | End: 2019-03-26

## 2019-03-22 RX ORDER — SODIUM CHLORIDE, SODIUM LACTATE, POTASSIUM CHLORIDE, CALCIUM CHLORIDE 600; 310; 30; 20 MG/100ML; MG/100ML; MG/100ML; MG/100ML
9 INJECTION, SOLUTION INTRAVENOUS CONTINUOUS
Status: DISCONTINUED | OUTPATIENT
Start: 2019-03-22 | End: 2019-03-23

## 2019-03-22 RX ORDER — THROMBIN HUMAN AND FIBRINOGEN 2; 5.5 [USP'U]/1; MG/1
PATCH TOPICAL AS NEEDED
Status: DISCONTINUED | OUTPATIENT
Start: 2019-03-22 | End: 2019-03-22 | Stop reason: HOSPADM

## 2019-03-22 RX ORDER — ALBUMIN, HUMAN INJ 5% 5 %
500 SOLUTION INTRAVENOUS AS NEEDED
Status: COMPLETED | OUTPATIENT
Start: 2019-03-22 | End: 2019-03-22

## 2019-03-22 RX ORDER — ASPIRIN 325 MG
325 TABLET, DELAYED RELEASE (ENTERIC COATED) ORAL DAILY
Status: DISCONTINUED | OUTPATIENT
Start: 2019-03-23 | End: 2019-03-28 | Stop reason: HOSPADM

## 2019-03-22 RX ORDER — ONDANSETRON 2 MG/ML
4 INJECTION INTRAMUSCULAR; INTRAVENOUS EVERY 6 HOURS PRN
Status: DISCONTINUED | OUTPATIENT
Start: 2019-03-22 | End: 2019-03-28 | Stop reason: HOSPADM

## 2019-03-22 RX ORDER — FENTANYL CITRATE 50 UG/ML
25 INJECTION, SOLUTION INTRAMUSCULAR; INTRAVENOUS
Status: DISCONTINUED | OUTPATIENT
Start: 2019-03-22 | End: 2019-03-25

## 2019-03-22 RX ORDER — CHLORHEXIDINE GLUCONATE 0.12 MG/ML
15 RINSE ORAL EVERY 12 HOURS SCHEDULED
Status: DISCONTINUED | OUTPATIENT
Start: 2019-03-22 | End: 2019-03-22

## 2019-03-22 RX ORDER — NITROGLYCERIN 20 MG/100ML
INJECTION INTRAVENOUS CONTINUOUS PRN
Status: DISCONTINUED | OUTPATIENT
Start: 2019-03-22 | End: 2019-03-22 | Stop reason: SURG

## 2019-03-22 RX ORDER — ALBUMIN, HUMAN INJ 5% 5 %
SOLUTION INTRAVENOUS
Status: DISPENSED
Start: 2019-03-22 | End: 2019-03-23

## 2019-03-22 RX ORDER — FAMOTIDINE 20 MG/1
20 TABLET, FILM COATED ORAL ONCE
Status: COMPLETED | OUTPATIENT
Start: 2019-03-22 | End: 2019-03-22

## 2019-03-22 RX ORDER — DOCUSATE SODIUM 100 MG/1
100 CAPSULE, LIQUID FILLED ORAL 2 TIMES DAILY PRN
Status: DISCONTINUED | OUTPATIENT
Start: 2019-03-22 | End: 2019-03-28 | Stop reason: HOSPADM

## 2019-03-22 RX ORDER — ALBUMIN, HUMAN INJ 5% 5 %
SOLUTION INTRAVENOUS CONTINUOUS PRN
Status: DISCONTINUED | OUTPATIENT
Start: 2019-03-22 | End: 2019-03-22 | Stop reason: SURG

## 2019-03-22 RX ADMIN — ALBUMIN HUMAN 500 ML: 0.05 INJECTION, SOLUTION INTRAVENOUS at 11:39

## 2019-03-22 RX ADMIN — AMINOCAPROIC ACID 10 G: 250 INJECTION, SOLUTION INTRAVENOUS at 10:03

## 2019-03-22 RX ADMIN — ROCURONIUM BROMIDE 100 MG: 10 INJECTION INTRAVENOUS at 07:35

## 2019-03-22 RX ADMIN — SODIUM CHLORIDE 6.3 UNITS/HR: 9 INJECTION, SOLUTION INTRAVENOUS at 22:26

## 2019-03-22 RX ADMIN — MORPHINE SULFATE 2 MG: 2 INJECTION, SOLUTION INTRAMUSCULAR; INTRAVENOUS at 18:05

## 2019-03-22 RX ADMIN — MORPHINE SULFATE 2 MG: 2 INJECTION, SOLUTION INTRAMUSCULAR; INTRAVENOUS at 15:06

## 2019-03-22 RX ADMIN — NITROGLYCERIN 0.4 MCG/KG/MIN: 20 INJECTION INTRAVENOUS at 10:10

## 2019-03-22 RX ADMIN — ALBUMIN HUMAN: 0.05 INJECTION, SOLUTION INTRAVENOUS at 10:28

## 2019-03-22 RX ADMIN — SODIUM CHLORIDE, POTASSIUM CHLORIDE, SODIUM LACTATE AND CALCIUM CHLORIDE 9 ML/HR: 600; 310; 30; 20 INJECTION, SOLUTION INTRAVENOUS at 06:10

## 2019-03-22 RX ADMIN — VECURONIUM BROMIDE 10 MG: 1 INJECTION, POWDER, LYOPHILIZED, FOR SOLUTION INTRAVENOUS at 08:41

## 2019-03-22 RX ADMIN — FAMOTIDINE 20 MG: 20 TABLET ORAL at 06:21

## 2019-03-22 RX ADMIN — SODIUM CHLORIDE 3.4 UNITS/HR: 9 INJECTION, SOLUTION INTRAVENOUS at 13:19

## 2019-03-22 RX ADMIN — GLYCOPYRROLATE 0.2 MG: 0.2 INJECTION, SOLUTION INTRAMUSCULAR; INTRAVENOUS at 09:56

## 2019-03-22 RX ADMIN — POTASSIUM CHLORIDE AND DEXTROSE MONOHYDRATE 30 ML/HR: 150; 5 INJECTION, SOLUTION INTRAVENOUS at 11:43

## 2019-03-22 RX ADMIN — ALBUMIN HUMAN 500 ML: 0.05 INJECTION, SOLUTION INTRAVENOUS at 18:35

## 2019-03-22 RX ADMIN — CEFAZOLIN SODIUM 2 G: 2 INJECTION, SOLUTION INTRAVENOUS at 22:26

## 2019-03-22 RX ADMIN — OXYCODONE HYDROCHLORIDE AND ACETAMINOPHEN 2 TABLET: 5; 325 TABLET ORAL at 17:27

## 2019-03-22 RX ADMIN — MUPIROCIN 1 APPLICATION: 20 OINTMENT TOPICAL at 06:28

## 2019-03-22 RX ADMIN — LIDOCAINE HYDROCHLORIDE 0.2 ML: 10 INJECTION, SOLUTION EPIDURAL; INFILTRATION; INTRACAUDAL; PERINEURAL at 06:10

## 2019-03-22 RX ADMIN — PROPOFOL 25 MCG/KG/MIN: 10 INJECTION, EMULSION INTRAVENOUS at 10:24

## 2019-03-22 RX ADMIN — PROTAMINE SULFATE 450 MG: 10 INJECTION, SOLUTION INTRAVENOUS at 10:03

## 2019-03-22 RX ADMIN — ALBUMIN HUMAN 500 ML: 0.05 INJECTION, SOLUTION INTRAVENOUS at 17:51

## 2019-03-22 RX ADMIN — ATORVASTATIN CALCIUM 40 MG: 40 TABLET, FILM COATED ORAL at 20:21

## 2019-03-22 RX ADMIN — CALCIUM CHLORIDE 1 G: 100 INJECTION INTRAVENOUS; INTRAVENTRICULAR at 10:03

## 2019-03-22 RX ADMIN — DOCUSATE SODIUM AND SENNOSIDES 2 TABLET: 50; 8.6 TABLET ORAL at 20:21

## 2019-03-22 RX ADMIN — PROTAMINE SULFATE 50 MG: 10 INJECTION, SOLUTION INTRAVENOUS at 11:48

## 2019-03-22 RX ADMIN — CEFUROXIME 1.5 G: 90 INJECTION, POWDER, FOR SOLUTION INTRAVENOUS at 07:46

## 2019-03-22 RX ADMIN — HEPARIN SODIUM 35000 UNITS: 1000 INJECTION, SOLUTION INTRAVENOUS; SUBCUTANEOUS at 08:17

## 2019-03-22 RX ADMIN — SUFENTANIL CITRATE 50 MCG: 50 INJECTION, SOLUTION EPIDURAL; INTRAVENOUS at 09:56

## 2019-03-22 RX ADMIN — EPHEDRINE SULFATE 5 MG: 50 INJECTION INTRAMUSCULAR; INTRAVENOUS; SUBCUTANEOUS at 09:56

## 2019-03-22 RX ADMIN — ALBUMIN HUMAN 500 ML: 0.05 INJECTION, SOLUTION INTRAVENOUS at 11:42

## 2019-03-22 RX ADMIN — LIDOCAINE HYDROCHLORIDE 100 MG: 20 INJECTION, SOLUTION INFILTRATION; PERINEURAL at 07:35

## 2019-03-22 RX ADMIN — AMIODARONE HYDROCHLORIDE 1 MG/MIN: 1.8 INJECTION, SOLUTION INTRAVENOUS at 10:11

## 2019-03-22 RX ADMIN — EPHEDRINE SULFATE 10 MG: 50 INJECTION INTRAMUSCULAR; INTRAVENOUS; SUBCUTANEOUS at 07:47

## 2019-03-22 RX ADMIN — HYDROCODONE BITARTRATE AND ACETAMINOPHEN 1 TABLET: 7.5; 325 TABLET ORAL at 20:24

## 2019-03-22 RX ADMIN — FAMOTIDINE 20 MG: 20 TABLET ORAL at 20:21

## 2019-03-22 RX ADMIN — SODIUM CHLORIDE: 9 INJECTION, SOLUTION INTRAVENOUS at 06:34

## 2019-03-22 RX ADMIN — MIDAZOLAM HYDROCHLORIDE 2 MG: 1 INJECTION, SOLUTION INTRAMUSCULAR; INTRAVENOUS at 07:35

## 2019-03-22 RX ADMIN — ETOMIDATE 6 MG: 2 INJECTION, SOLUTION INTRAVENOUS at 07:35

## 2019-03-22 RX ADMIN — AMINOCAPROIC ACID 10 G: 250 INJECTION, SOLUTION INTRAVENOUS at 07:55

## 2019-03-22 RX ADMIN — METOPROLOL TARTRATE 2 MG: 5 INJECTION, SOLUTION INTRAVENOUS at 07:35

## 2019-03-22 RX ADMIN — SUFENTANIL CITRATE 50 MCG: 50 INJECTION, SOLUTION EPIDURAL; INTRAVENOUS at 10:14

## 2019-03-22 RX ADMIN — CEFAZOLIN SODIUM 2 G: 2 INJECTION, SOLUTION INTRAVENOUS at 15:48

## 2019-03-22 RX ADMIN — SUFENTANIL CITRATE 100 MCG: 50 INJECTION, SOLUTION EPIDURAL; INTRAVENOUS at 07:35

## 2019-03-22 RX ADMIN — PROPOFOL 35 MCG/KG/MIN: 10 INJECTION, EMULSION INTRAVENOUS at 13:43

## 2019-03-22 RX ADMIN — AMIODARONE HYDROCHLORIDE 1 MG/MIN: 1.8 INJECTION, SOLUTION INTRAVENOUS at 11:56

## 2019-03-22 RX ADMIN — PROPOFOL 50 MCG/KG/MIN: 10 INJECTION, EMULSION INTRAVENOUS at 11:44

## 2019-03-22 RX ADMIN — MORPHINE SULFATE 2 MG: 2 INJECTION, SOLUTION INTRAMUSCULAR; INTRAVENOUS at 15:52

## 2019-03-22 RX ADMIN — AMIODARONE HYDROCHLORIDE 1 MG/MIN: 1.8 INJECTION, SOLUTION INTRAVENOUS at 15:18

## 2019-03-22 RX ADMIN — SODIUM CHLORIDE 10 UNITS/HR: 9 INJECTION, SOLUTION INTRAVENOUS at 08:03

## 2019-03-22 RX ADMIN — ONDANSETRON 4 MG: 2 INJECTION INTRAMUSCULAR; INTRAVENOUS at 17:52

## 2019-03-22 RX ADMIN — SUFENTANIL CITRATE 50 MCG: 50 INJECTION, SOLUTION EPIDURAL; INTRAVENOUS at 08:06

## 2019-03-22 RX ADMIN — FENTANYL CITRATE 25 MCG: 50 INJECTION INTRAMUSCULAR; INTRAVENOUS at 17:27

## 2019-03-22 RX ADMIN — HYDROCODONE BITARTRATE AND ACETAMINOPHEN 1 TABLET: 7.5; 325 TABLET ORAL at 15:01

## 2019-03-22 RX ADMIN — FENTANYL CITRATE 25 MCG: 50 INJECTION INTRAMUSCULAR; INTRAVENOUS at 16:34

## 2019-03-22 RX ADMIN — CHLORHEXIDINE GLUCONATE 0.12% ORAL RINSE 15 ML: 1.2 LIQUID ORAL at 06:21

## 2019-03-22 RX ADMIN — CEFUROXIME 1.5 G: 90 INJECTION, POWDER, FOR SOLUTION INTRAVENOUS at 10:03

## 2019-03-22 NOTE — ANESTHESIA PROCEDURE NOTES
Central Line      Patient location during procedure: OR  Indications: vascular access  Staff  Anesthesiologist: Tom Dias MD  Preanesthetic Checklist  Completed: patient identified, site marked, surgical consent, pre-op evaluation, timeout performed, IV checked, risks and benefits discussed and monitors and equipment checked  Central Line Prep  Sterile Tech:cap, gloves, gown, mask and sterile barriers  Prep: chloraprep  Patient monitoring: blood pressure monitoring, continuous pulse oximetry and EKG  Central Line Procedure  Laterality:right  Location:internal jugular  Catheter Type:[place]ordis and [place]  Catheter Size:9 Fr  Guidance:landmark technique  PROCEDURE NOTE/ULTRASOUND INTERPRETATION.  Using ultrasound guidance the potential vascular sites for insertion of the catheter were visualized to determine the patency of the vessel to be used for vascular access.  After selecting the appropriate site for insertion, the needle was visualized under ultrasound being inserted into the internal jugular vein, followed by ultrasound confirmation of wire and catheter placement. There were no abnormalities seen on ultrasound; an image was taken; and the patient tolerated the procedure with no complications.   Assessment  Post procedure:biopatch applied, line sutured, occlusive dressing applied and secured with tape  Assessement:blood return through all ports, free fluid flow, chest x-ray ordered and Tavares Test  Complications:no  Patient Tolerance:patient tolerated the procedure well with no apparent complications

## 2019-03-22 NOTE — ANESTHESIA PROCEDURE NOTES
Airway  Urgency: elective    Airway not difficult    General Information and Staff    Patient location during procedure: OR    Indications and Patient Condition  Indications for airway management: airway protection    Preoxygenated: yes  MILS not maintained throughout  Mask difficulty assessment: 1 - vent by mask    Final Airway Details  Final airway type: endotracheal airway      Successful airway: ETT  Cuffed: yes   Successful intubation technique: direct laryngoscopy  Endotracheal tube insertion site: oral  Blade: Rossana  Blade size: 3  ETT size (mm): 8.0  Cormack-Lehane Classification: grade IIa - partial view of glottis  Placement verified by: chest auscultation and capnometry   Measured from: lips  ETT to lips (cm): 20  Number of attempts at approach: 1    Additional Comments  Negative epigastric sounds, Breath sound equal bilaterally with symmetric chest rise and fall

## 2019-03-22 NOTE — ANESTHESIA POSTPROCEDURE EVALUATION
Patient: Jermaine Singh    Procedure Summary     Date:  03/22/19 Room / Location:   [facility]    Anesthesia Start:  0731 Anesthesia Stop:  1047    Procedure:  MEDIAN STERNOTOMY, CORONARY ARTERY BYPASS GRAFT X3, UTILIZING THE LEFT INTERNAL MAMMARY ARTERY, EVH AND OPEN HARVEST OF THE RIGHT GREATER SAPHENOUS VEIN, EXPLORATION OF THE LEFT LEG (N/A Chest) Diagnosis:       Coronary artery disease involving native coronary artery of native heart with other form of angina pectoris (CMS/HCC)      (Coronary artery disease involving native coronary artery of native heart with other form of angina pectoris (CMS/HCC) [I25.118])    Surgeon:  Ap Au MD Provider:  Tom Dias MD    Anesthesia Type:  general ASA Status:  4          Anesthesia Type: general  Last vitals  BP   97/50 (03/22/19 1047)   Temp   97.9 °F (36.6 °C) (03/22/19 1047)   Pulse   88 (03/22/19 1047)   Resp   18 (03/22/19 0618)     SpO2   99 % (03/22/19 1047)     Post Anesthesia Care and Evaluation    Patient location during evaluation: ICU  Patient participation: complete - patient cannot participate  Post-procedure mental status: sedated.  Pain score: 0  Pain management: adequate  Airway patency: intubated.  Anesthetic complications: No anesthetic complications  PONV Status: none  Cardiovascular status: acceptable  Respiratory status: intubated and ventilator  Hydration status: acceptable  No anesthesia care post op

## 2019-03-22 NOTE — ANESTHESIA PREPROCEDURE EVALUATION
Anesthesia Evaluation     Patient summary reviewed and Nursing notes reviewed   no history of anesthetic complications:  NPO Solid Status: > 8 hours  NPO Liquid Status: > 8 hours           Airway   Mallampati: II  TM distance: >3 FB  Neck ROM: full  No difficulty expected  Dental - normal exam     Pulmonary - negative pulmonary ROS and normal exam    breath sounds clear to auscultation  Cardiovascular - normal exam    ECG reviewed  Patient on routine beta blocker and Beta blocker given within 24 hours of surgery  Rhythm: regular  Rate: normal    (+) hypertension, CAD, PVD,     ROS comment: EF 60%  LM: Calcific plaque up to 40 % stenosis.     Right coronary artery: 100% occlusion proximal.     Circumflex coronary artery: Large codominant vessel with 60% stenosis proximal.  Diffuse disease in the distal marginal branches.  Subtotal occlusion of a mid marginal branch.     Left anterior descending coronary artery: Diffusely diseased vessel.  70% stenosis in the ostium extending into the left main.  Severe atherosclerotic plaque and calcification noted throughout the proximal and mid vessel.  90% stenosis distally prior to the bifurcated apical branches.  90% stenosis proximal segment of major diagonal branch.    Neuro/Psych- negative ROS  GI/Hepatic/Renal/Endo    (+)   diabetes mellitus type 2 using insulin, hypothyroidism,     Musculoskeletal (-) negative ROS    Abdominal    Substance History - negative use     OB/GYN          Other                        Anesthesia Plan    ASA 4     general   (A-line, CVP/PAC, ICU/Vent)  intravenous induction   Anesthetic plan, all risks, benefits, and alternatives have been provided, discussed and informed consent has been obtained with: patient.    Plan discussed with CRNA.

## 2019-03-22 NOTE — ANESTHESIA PROCEDURE NOTES
Arterial Line      Patient location during procedure: pre-op  Start time: 3/22/2019 6:35 AM  Stop Time:3/22/2019 6:40 AM       Line placed for hemodynamic monitoring.  Performed By   Anesthesiologist: Tom Dias MD  Preanesthetic Checklist  Completed: patient identified, site marked, surgical consent, pre-op evaluation, timeout performed, IV checked, risks and benefits discussed and monitors and equipment checked  Arterial Line Prep   Sterile Tech: cap, gloves and sterile barriers  Prep: ChloraPrep  Patient monitoring: blood pressure monitoring, continuous pulse oximetry and EKG  Arterial Line Procedure   Laterality:right  Location:  radial artery  Catheter size: 20 G   Guidance: palpation technique  Number of attempts: 1  Successful placement: yes          Post Assessment   Dressing Type: line sutured, occlusive dressing applied, secured with tape and wrist guard applied.   Complications no  Circ/Move/Sens Assessment: normal and unchanged.   Patient Tolerance: patient tolerated the procedure well with no apparent complications

## 2019-03-23 ENCOUNTER — APPOINTMENT (OUTPATIENT)
Dept: GENERAL RADIOLOGY | Facility: HOSPITAL | Age: 74
End: 2019-03-23

## 2019-03-23 LAB
ABO + RH BLD: NORMAL
ABO + RH BLD: NORMAL
ALBUMIN SERPL-MCNC: 3.93 G/DL (ref 3.2–4.8)
ANION GAP SERPL CALCULATED.3IONS-SCNC: 8 MMOL/L (ref 3–11)
BASOPHILS # BLD AUTO: 0.01 10*3/MM3 (ref 0–0.2)
BASOPHILS NFR BLD AUTO: 0.1 % (ref 0–1)
BH BB BLOOD EXPIRATION DATE: NORMAL
BH BB BLOOD EXPIRATION DATE: NORMAL
BH BB BLOOD TYPE BARCODE: 6200
BH BB BLOOD TYPE BARCODE: 8400
BH BB DISPENSE STATUS: NORMAL
BH BB DISPENSE STATUS: NORMAL
BH BB PRODUCT CODE: NORMAL
BH BB PRODUCT CODE: NORMAL
BH BB UNIT NUMBER: NORMAL
BH BB UNIT NUMBER: NORMAL
BUN BLD-MCNC: 22 MG/DL (ref 9–23)
BUN/CREAT SERPL: 21.8 (ref 7–25)
CALCIUM SPEC-SCNC: 8.6 MG/DL (ref 8.7–10.4)
CHLORIDE SERPL-SCNC: 106 MMOL/L (ref 99–109)
CO2 SERPL-SCNC: 26 MMOL/L (ref 20–31)
CREAT BLD-MCNC: 1.01 MG/DL (ref 0.6–1.3)
DEPRECATED RDW RBC AUTO: 43.1 FL (ref 37–54)
EOSINOPHIL # BLD AUTO: 0.01 10*3/MM3 (ref 0–0.3)
EOSINOPHIL NFR BLD AUTO: 0.1 % (ref 0–3)
ERYTHROCYTE [DISTWIDTH] IN BLOOD BY AUTOMATED COUNT: 13.4 % (ref 11.3–14.5)
GFR SERPL CREATININE-BSD FRML MDRD: 72 ML/MIN/1.73
GLUCOSE BLD-MCNC: 115 MG/DL (ref 70–100)
GLUCOSE BLDC GLUCOMTR-MCNC: 109 MG/DL (ref 70–130)
GLUCOSE BLDC GLUCOMTR-MCNC: 113 MG/DL (ref 70–130)
GLUCOSE BLDC GLUCOMTR-MCNC: 113 MG/DL (ref 70–130)
GLUCOSE BLDC GLUCOMTR-MCNC: 115 MG/DL (ref 70–130)
GLUCOSE BLDC GLUCOMTR-MCNC: 121 MG/DL (ref 70–130)
GLUCOSE BLDC GLUCOMTR-MCNC: 122 MG/DL (ref 70–130)
GLUCOSE BLDC GLUCOMTR-MCNC: 123 MG/DL (ref 70–130)
GLUCOSE BLDC GLUCOMTR-MCNC: 124 MG/DL (ref 70–130)
GLUCOSE BLDC GLUCOMTR-MCNC: 127 MG/DL (ref 70–130)
GLUCOSE BLDC GLUCOMTR-MCNC: 128 MG/DL (ref 70–130)
GLUCOSE BLDC GLUCOMTR-MCNC: 130 MG/DL (ref 70–130)
GLUCOSE BLDC GLUCOMTR-MCNC: 139 MG/DL (ref 70–130)
GLUCOSE BLDC GLUCOMTR-MCNC: 139 MG/DL (ref 70–130)
GLUCOSE BLDC GLUCOMTR-MCNC: 140 MG/DL (ref 70–130)
GLUCOSE BLDC GLUCOMTR-MCNC: 143 MG/DL (ref 70–130)
GLUCOSE BLDC GLUCOMTR-MCNC: 146 MG/DL (ref 70–130)
GLUCOSE BLDC GLUCOMTR-MCNC: 157 MG/DL (ref 70–130)
GLUCOSE BLDC GLUCOMTR-MCNC: 160 MG/DL (ref 70–130)
GLUCOSE BLDC GLUCOMTR-MCNC: 88 MG/DL (ref 70–130)
GLUCOSE BLDC GLUCOMTR-MCNC: 97 MG/DL (ref 70–130)
GLUCOSE BLDC GLUCOMTR-MCNC: 98 MG/DL (ref 70–130)
HCT VFR BLD AUTO: 28.8 % (ref 38.9–50.9)
HGB BLD-MCNC: 9.6 G/DL (ref 13.1–17.5)
IMM GRANULOCYTES # BLD AUTO: 0.04 10*3/MM3 (ref 0–0.05)
IMM GRANULOCYTES NFR BLD AUTO: 0.3 % (ref 0–0.6)
INR PPP: 1.12 (ref 0.85–1.16)
LYMPHOCYTES # BLD AUTO: 0.95 10*3/MM3 (ref 0.6–4.8)
LYMPHOCYTES NFR BLD AUTO: 6.2 % (ref 24–44)
MAGNESIUM SERPL-MCNC: 2.1 MG/DL (ref 1.3–2.7)
MCH RBC QN AUTO: 29.4 PG (ref 27–31)
MCHC RBC AUTO-ENTMCNC: 33.3 G/DL (ref 32–36)
MCV RBC AUTO: 88.3 FL (ref 80–99)
MONOCYTES # BLD AUTO: 1.21 10*3/MM3 (ref 0–1)
MONOCYTES NFR BLD AUTO: 7.9 % (ref 0–12)
NEUTROPHILS # BLD AUTO: 13.08 10*3/MM3 (ref 1.5–8.3)
NEUTROPHILS NFR BLD AUTO: 85.7 % (ref 41–71)
PHOSPHATE SERPL-MCNC: 3.8 MG/DL (ref 2.4–5.1)
PLATELET # BLD AUTO: 207 10*3/MM3 (ref 150–450)
PMV BLD AUTO: 10.9 FL (ref 6–12)
POTASSIUM BLD-SCNC: 3.7 MMOL/L (ref 3.5–5.5)
PROTHROMBIN TIME: 13.8 SECONDS (ref 11.2–14.3)
RBC # BLD AUTO: 3.26 10*6/MM3 (ref 4.2–5.76)
SODIUM BLD-SCNC: 140 MMOL/L (ref 132–146)
UNIT  ABO: NORMAL
UNIT  ABO: NORMAL
UNIT  RH: NORMAL
UNIT  RH: NORMAL
WBC NRBC COR # BLD: 15.26 10*3/MM3 (ref 3.5–10.8)

## 2019-03-23 PROCEDURE — 99024 POSTOP FOLLOW-UP VISIT: CPT | Performed by: THORACIC SURGERY (CARDIOTHORACIC VASCULAR SURGERY)

## 2019-03-23 PROCEDURE — 25010000003 CEFAZOLIN IN DEXTROSE 2-4 GM/100ML-% SOLUTION: Performed by: THORACIC SURGERY (CARDIOTHORACIC VASCULAR SURGERY)

## 2019-03-23 PROCEDURE — 83735 ASSAY OF MAGNESIUM: CPT | Performed by: PHYSICIAN ASSISTANT

## 2019-03-23 PROCEDURE — 85025 COMPLETE CBC W/AUTO DIFF WBC: CPT | Performed by: PHYSICIAN ASSISTANT

## 2019-03-23 PROCEDURE — 25010000002 KETOROLAC TROMETHAMINE PER 15 MG: Performed by: INTERNAL MEDICINE

## 2019-03-23 PROCEDURE — 25010000002 ONDANSETRON PER 1 MG: Performed by: PHYSICIAN ASSISTANT

## 2019-03-23 PROCEDURE — 99024 POSTOP FOLLOW-UP VISIT: CPT | Performed by: INTERNAL MEDICINE

## 2019-03-23 PROCEDURE — 99233 SBSQ HOSP IP/OBS HIGH 50: CPT | Performed by: INTERNAL MEDICINE

## 2019-03-23 PROCEDURE — 63710000001 INSULIN DETEMIR PER 5 UNITS: Performed by: INTERNAL MEDICINE

## 2019-03-23 PROCEDURE — 25010000002 AMIODARONE IN DEXTROSE 5% 360-4.14 MG/200ML-% SOLUTION: Performed by: THORACIC SURGERY (CARDIOTHORACIC VASCULAR SURGERY)

## 2019-03-23 PROCEDURE — 93005 ELECTROCARDIOGRAM TRACING: CPT | Performed by: PHYSICIAN ASSISTANT

## 2019-03-23 PROCEDURE — 97162 PT EVAL MOD COMPLEX 30 MIN: CPT

## 2019-03-23 PROCEDURE — 25010000002 MORPHINE PER 10 MG: Performed by: PHYSICIAN ASSISTANT

## 2019-03-23 PROCEDURE — 25010000002 MORPHINE PER 10 MG: Performed by: THORACIC SURGERY (CARDIOTHORACIC VASCULAR SURGERY)

## 2019-03-23 PROCEDURE — 82962 GLUCOSE BLOOD TEST: CPT

## 2019-03-23 PROCEDURE — 93010 ELECTROCARDIOGRAM REPORT: CPT | Performed by: INTERNAL MEDICINE

## 2019-03-23 PROCEDURE — 80069 RENAL FUNCTION PANEL: CPT | Performed by: PHYSICIAN ASSISTANT

## 2019-03-23 PROCEDURE — 71045 X-RAY EXAM CHEST 1 VIEW: CPT

## 2019-03-23 PROCEDURE — 85610 PROTHROMBIN TIME: CPT | Performed by: PHYSICIAN ASSISTANT

## 2019-03-23 PROCEDURE — 63710000001 INSULIN LISPRO (HUMAN) PER 5 UNITS: Performed by: INTERNAL MEDICINE

## 2019-03-23 RX ORDER — KETOROLAC TROMETHAMINE 15 MG/ML
15 INJECTION, SOLUTION INTRAMUSCULAR; INTRAVENOUS EVERY 6 HOURS PRN
Status: DISPENSED | OUTPATIENT
Start: 2019-03-23 | End: 2019-03-24

## 2019-03-23 RX ORDER — DEXTROSE MONOHYDRATE 25 G/50ML
25 INJECTION, SOLUTION INTRAVENOUS
Status: DISCONTINUED | OUTPATIENT
Start: 2019-03-23 | End: 2019-03-28 | Stop reason: HOSPADM

## 2019-03-23 RX ORDER — MORPHINE SULFATE 4 MG/ML
4 INJECTION, SOLUTION INTRAMUSCULAR; INTRAVENOUS EVERY 4 HOURS PRN
Status: DISCONTINUED | OUTPATIENT
Start: 2019-03-23 | End: 2019-03-26

## 2019-03-23 RX ORDER — NICOTINE POLACRILEX 4 MG
15 LOZENGE BUCCAL
Status: DISCONTINUED | OUTPATIENT
Start: 2019-03-23 | End: 2019-03-28 | Stop reason: HOSPADM

## 2019-03-23 RX ADMIN — SODIUM CHLORIDE 4 UNITS/HR: 9 INJECTION, SOLUTION INTRAVENOUS at 12:31

## 2019-03-23 RX ADMIN — MORPHINE SULFATE 2 MG: 2 INJECTION, SOLUTION INTRAMUSCULAR; INTRAVENOUS at 04:43

## 2019-03-23 RX ADMIN — OXYCODONE HYDROCHLORIDE AND ACETAMINOPHEN 2 TABLET: 5; 325 TABLET ORAL at 07:50

## 2019-03-23 RX ADMIN — INSULIN LISPRO 2 UNITS: 100 INJECTION, SOLUTION INTRAVENOUS; SUBCUTANEOUS at 21:49

## 2019-03-23 RX ADMIN — INSULIN DETEMIR 30 UNITS: 100 INJECTION, SOLUTION SUBCUTANEOUS at 20:18

## 2019-03-23 RX ADMIN — CEFAZOLIN SODIUM 2 G: 2 INJECTION, SOLUTION INTRAVENOUS at 23:09

## 2019-03-23 RX ADMIN — AMIODARONE HYDROCHLORIDE 0.5 MG/MIN: 1.8 INJECTION, SOLUTION INTRAVENOUS at 02:05

## 2019-03-23 RX ADMIN — CEFAZOLIN SODIUM 2 G: 2 INJECTION, SOLUTION INTRAVENOUS at 06:05

## 2019-03-23 RX ADMIN — METOPROLOL TARTRATE 12.5 MG: 25 TABLET, FILM COATED ORAL at 20:17

## 2019-03-23 RX ADMIN — MORPHINE SULFATE 4 MG: 4 INJECTION INTRAVENOUS at 10:08

## 2019-03-23 RX ADMIN — ONDANSETRON 4 MG: 2 INJECTION INTRAMUSCULAR; INTRAVENOUS at 16:33

## 2019-03-23 RX ADMIN — ATORVASTATIN CALCIUM 40 MG: 40 TABLET, FILM COATED ORAL at 20:17

## 2019-03-23 RX ADMIN — INSULIN LISPRO 5 UNITS: 100 INJECTION, SOLUTION INTRAVENOUS; SUBCUTANEOUS at 21:50

## 2019-03-23 RX ADMIN — DOCUSATE SODIUM AND SENNOSIDES 2 TABLET: 50; 8.6 TABLET ORAL at 20:17

## 2019-03-23 RX ADMIN — FAMOTIDINE 20 MG: 20 TABLET ORAL at 09:34

## 2019-03-23 RX ADMIN — OXYCODONE HYDROCHLORIDE AND ACETAMINOPHEN 2 TABLET: 5; 325 TABLET ORAL at 12:06

## 2019-03-23 RX ADMIN — MORPHINE SULFATE 2 MG: 2 INJECTION, SOLUTION INTRAMUSCULAR; INTRAVENOUS at 12:06

## 2019-03-23 RX ADMIN — KETOROLAC TROMETHAMINE 15 MG: 15 INJECTION, SOLUTION INTRAMUSCULAR; INTRAVENOUS at 20:54

## 2019-03-23 RX ADMIN — DOCUSATE SODIUM AND SENNOSIDES 2 TABLET: 50; 8.6 TABLET ORAL at 09:31

## 2019-03-23 RX ADMIN — CEFAZOLIN SODIUM 2 G: 2 INJECTION, SOLUTION INTRAVENOUS at 15:03

## 2019-03-23 RX ADMIN — MORPHINE SULFATE 4 MG: 4 INJECTION INTRAVENOUS at 16:26

## 2019-03-23 RX ADMIN — FAMOTIDINE 20 MG: 20 TABLET ORAL at 20:18

## 2019-03-23 RX ADMIN — HYDROCODONE BITARTRATE AND ACETAMINOPHEN 1 TABLET: 7.5; 325 TABLET ORAL at 10:24

## 2019-03-23 RX ADMIN — ONDANSETRON 4 MG: 2 INJECTION INTRAMUSCULAR; INTRAVENOUS at 04:48

## 2019-03-23 RX ADMIN — ASPIRIN 325 MG: 325 TABLET, COATED ORAL at 09:36

## 2019-03-24 ENCOUNTER — APPOINTMENT (OUTPATIENT)
Dept: GENERAL RADIOLOGY | Facility: HOSPITAL | Age: 74
End: 2019-03-24

## 2019-03-24 LAB
ANION GAP SERPL CALCULATED.3IONS-SCNC: 8 MMOL/L (ref 3–11)
BUN BLD-MCNC: 24 MG/DL (ref 9–23)
BUN/CREAT SERPL: 19.8 (ref 7–25)
CALCIUM SPEC-SCNC: 8.7 MG/DL (ref 8.7–10.4)
CHLORIDE SERPL-SCNC: 101 MMOL/L (ref 99–109)
CO2 SERPL-SCNC: 29 MMOL/L (ref 20–31)
CREAT BLD-MCNC: 1.21 MG/DL (ref 0.6–1.3)
DEPRECATED RDW RBC AUTO: 43.9 FL (ref 37–54)
ERYTHROCYTE [DISTWIDTH] IN BLOOD BY AUTOMATED COUNT: 13.4 % (ref 11.3–14.5)
GFR SERPL CREATININE-BSD FRML MDRD: 59 ML/MIN/1.73
GLUCOSE BLD-MCNC: 247 MG/DL (ref 70–100)
GLUCOSE BLDC GLUCOMTR-MCNC: 149 MG/DL (ref 70–130)
GLUCOSE BLDC GLUCOMTR-MCNC: 217 MG/DL (ref 70–130)
GLUCOSE BLDC GLUCOMTR-MCNC: 231 MG/DL (ref 70–130)
GLUCOSE BLDC GLUCOMTR-MCNC: 232 MG/DL (ref 70–130)
HCT VFR BLD AUTO: 31 % (ref 38.9–50.9)
HGB BLD-MCNC: 10.1 G/DL (ref 13.1–17.5)
MCH RBC QN AUTO: 29.2 PG (ref 27–31)
MCHC RBC AUTO-ENTMCNC: 32.6 G/DL (ref 32–36)
MCV RBC AUTO: 89.6 FL (ref 80–99)
PLATELET # BLD AUTO: 173 10*3/MM3 (ref 150–450)
PMV BLD AUTO: 11.5 FL (ref 6–12)
POTASSIUM BLD-SCNC: 4.2 MMOL/L (ref 3.5–5.5)
RBC # BLD AUTO: 3.46 10*6/MM3 (ref 4.2–5.76)
SODIUM BLD-SCNC: 138 MMOL/L (ref 132–146)
WBC NRBC COR # BLD: 15.04 10*3/MM3 (ref 3.5–10.8)

## 2019-03-24 PROCEDURE — 99024 POSTOP FOLLOW-UP VISIT: CPT | Performed by: THORACIC SURGERY (CARDIOTHORACIC VASCULAR SURGERY)

## 2019-03-24 PROCEDURE — 25010000002 HEPARIN (PORCINE) PER 1000 UNITS: Performed by: INTERNAL MEDICINE

## 2019-03-24 PROCEDURE — 63710000001 INSULIN DETEMIR PER 5 UNITS: Performed by: INTERNAL MEDICINE

## 2019-03-24 PROCEDURE — 85027 COMPLETE CBC AUTOMATED: CPT | Performed by: PHYSICIAN ASSISTANT

## 2019-03-24 PROCEDURE — 93005 ELECTROCARDIOGRAM TRACING: CPT | Performed by: PHYSICIAN ASSISTANT

## 2019-03-24 PROCEDURE — 71045 X-RAY EXAM CHEST 1 VIEW: CPT

## 2019-03-24 PROCEDURE — 99233 SBSQ HOSP IP/OBS HIGH 50: CPT | Performed by: INTERNAL MEDICINE

## 2019-03-24 PROCEDURE — 99024 POSTOP FOLLOW-UP VISIT: CPT | Performed by: INTERNAL MEDICINE

## 2019-03-24 PROCEDURE — 82962 GLUCOSE BLOOD TEST: CPT

## 2019-03-24 PROCEDURE — 63710000001 INSULIN LISPRO (HUMAN) PER 5 UNITS: Performed by: INTERNAL MEDICINE

## 2019-03-24 PROCEDURE — 93010 ELECTROCARDIOGRAM REPORT: CPT | Performed by: INTERNAL MEDICINE

## 2019-03-24 PROCEDURE — 80048 BASIC METABOLIC PNL TOTAL CA: CPT | Performed by: PHYSICIAN ASSISTANT

## 2019-03-24 RX ORDER — HEPARIN SODIUM 5000 [USP'U]/ML
5000 INJECTION, SOLUTION INTRAVENOUS; SUBCUTANEOUS EVERY 8 HOURS SCHEDULED
Status: DISCONTINUED | OUTPATIENT
Start: 2019-03-24 | End: 2019-03-28 | Stop reason: HOSPADM

## 2019-03-24 RX ORDER — LEVOTHYROXINE SODIUM 0.05 MG/1
50 TABLET ORAL
Status: DISCONTINUED | OUTPATIENT
Start: 2019-03-24 | End: 2019-03-28 | Stop reason: HOSPADM

## 2019-03-24 RX ADMIN — DOCUSATE SODIUM AND SENNOSIDES 2 TABLET: 50; 8.6 TABLET ORAL at 08:15

## 2019-03-24 RX ADMIN — ASPIRIN 325 MG: 325 TABLET, COATED ORAL at 08:15

## 2019-03-24 RX ADMIN — INSULIN LISPRO 5 UNITS: 100 INJECTION, SOLUTION INTRAVENOUS; SUBCUTANEOUS at 17:16

## 2019-03-24 RX ADMIN — OXYCODONE HYDROCHLORIDE AND ACETAMINOPHEN 2 TABLET: 5; 325 TABLET ORAL at 06:28

## 2019-03-24 RX ADMIN — INSULIN LISPRO 5 UNITS: 100 INJECTION, SOLUTION INTRAVENOUS; SUBCUTANEOUS at 12:36

## 2019-03-24 RX ADMIN — DOCUSATE SODIUM AND SENNOSIDES 2 TABLET: 50; 8.6 TABLET ORAL at 20:16

## 2019-03-24 RX ADMIN — OXYCODONE HYDROCHLORIDE AND ACETAMINOPHEN 2 TABLET: 5; 325 TABLET ORAL at 12:36

## 2019-03-24 RX ADMIN — FAMOTIDINE 20 MG: 20 TABLET ORAL at 08:15

## 2019-03-24 RX ADMIN — HEPARIN SODIUM 5000 UNITS: 5000 INJECTION INTRAVENOUS; SUBCUTANEOUS at 21:01

## 2019-03-24 RX ADMIN — INSULIN LISPRO 4 UNITS: 100 INJECTION, SOLUTION INTRAVENOUS; SUBCUTANEOUS at 20:16

## 2019-03-24 RX ADMIN — INSULIN LISPRO 4 UNITS: 100 INJECTION, SOLUTION INTRAVENOUS; SUBCUTANEOUS at 17:16

## 2019-03-24 RX ADMIN — HYDROCODONE BITARTRATE AND ACETAMINOPHEN 1 TABLET: 7.5; 325 TABLET ORAL at 23:58

## 2019-03-24 RX ADMIN — ATORVASTATIN CALCIUM 40 MG: 40 TABLET, FILM COATED ORAL at 20:16

## 2019-03-24 RX ADMIN — FAMOTIDINE 20 MG: 20 TABLET ORAL at 20:16

## 2019-03-24 RX ADMIN — HYDROCODONE BITARTRATE AND ACETAMINOPHEN 1 TABLET: 7.5; 325 TABLET ORAL at 19:19

## 2019-03-24 RX ADMIN — INSULIN DETEMIR 30 UNITS: 100 INJECTION, SOLUTION SUBCUTANEOUS at 20:16

## 2019-03-24 RX ADMIN — INSULIN LISPRO 5 UNITS: 100 INJECTION, SOLUTION INTRAVENOUS; SUBCUTANEOUS at 08:21

## 2019-03-24 RX ADMIN — INSULIN LISPRO 4 UNITS: 100 INJECTION, SOLUTION INTRAVENOUS; SUBCUTANEOUS at 08:21

## 2019-03-24 RX ADMIN — HYDROCODONE BITARTRATE AND ACETAMINOPHEN 1 TABLET: 7.5; 325 TABLET ORAL at 08:20

## 2019-03-25 ENCOUNTER — TRANSCRIBE ORDERS (OUTPATIENT)
Dept: CARDIAC REHAB | Facility: HOSPITAL | Age: 74
End: 2019-03-25

## 2019-03-25 DIAGNOSIS — Z95.1 S/P CABG (CORONARY ARTERY BYPASS GRAFT): Primary | ICD-10-CM

## 2019-03-25 LAB
ABO + RH BLD: NORMAL
ABO + RH BLD: NORMAL
ANION GAP SERPL CALCULATED.3IONS-SCNC: 6 MMOL/L (ref 3–11)
BH BB BLOOD EXPIRATION DATE: NORMAL
BH BB BLOOD EXPIRATION DATE: NORMAL
BH BB BLOOD TYPE BARCODE: 6200
BH BB BLOOD TYPE BARCODE: 6200
BH BB DISPENSE STATUS: NORMAL
BH BB DISPENSE STATUS: NORMAL
BH BB PRODUCT CODE: NORMAL
BH BB PRODUCT CODE: NORMAL
BH BB UNIT NUMBER: NORMAL
BH BB UNIT NUMBER: NORMAL
BUN BLD-MCNC: 20 MG/DL (ref 9–23)
BUN/CREAT SERPL: 19 (ref 7–25)
CALCIUM SPEC-SCNC: 8.7 MG/DL (ref 8.7–10.4)
CHLORIDE SERPL-SCNC: 103 MMOL/L (ref 99–109)
CO2 SERPL-SCNC: 28 MMOL/L (ref 20–31)
CREAT BLD-MCNC: 1.05 MG/DL (ref 0.6–1.3)
DEPRECATED RDW RBC AUTO: 42.8 FL (ref 37–54)
ERYTHROCYTE [DISTWIDTH] IN BLOOD BY AUTOMATED COUNT: 13.1 % (ref 11.3–14.5)
GFR SERPL CREATININE-BSD FRML MDRD: 69 ML/MIN/1.73
GLUCOSE BLD-MCNC: 174 MG/DL (ref 70–100)
GLUCOSE BLDC GLUCOMTR-MCNC: 144 MG/DL (ref 70–130)
GLUCOSE BLDC GLUCOMTR-MCNC: 178 MG/DL (ref 70–130)
GLUCOSE BLDC GLUCOMTR-MCNC: 207 MG/DL (ref 70–130)
GLUCOSE BLDC GLUCOMTR-MCNC: 230 MG/DL (ref 70–130)
HCT VFR BLD AUTO: 28.8 % (ref 38.9–50.9)
HGB BLD-MCNC: 9.6 G/DL (ref 13.1–17.5)
MCH RBC QN AUTO: 29.6 PG (ref 27–31)
MCHC RBC AUTO-ENTMCNC: 33.3 G/DL (ref 32–36)
MCV RBC AUTO: 88.9 FL (ref 80–99)
PLATELET # BLD AUTO: 172 10*3/MM3 (ref 150–450)
PMV BLD AUTO: 11.5 FL (ref 6–12)
POTASSIUM BLD-SCNC: 3.9 MMOL/L (ref 3.5–5.5)
RBC # BLD AUTO: 3.24 10*6/MM3 (ref 4.2–5.76)
SODIUM BLD-SCNC: 137 MMOL/L (ref 132–146)
UNIT  ABO: NORMAL
UNIT  ABO: NORMAL
UNIT  RH: NORMAL
UNIT  RH: NORMAL
WBC NRBC COR # BLD: 11.75 10*3/MM3 (ref 3.5–10.8)

## 2019-03-25 PROCEDURE — 80048 BASIC METABOLIC PNL TOTAL CA: CPT | Performed by: PHYSICIAN ASSISTANT

## 2019-03-25 PROCEDURE — 25010000002 HEPARIN (PORCINE) PER 1000 UNITS: Performed by: INTERNAL MEDICINE

## 2019-03-25 PROCEDURE — 85027 COMPLETE CBC AUTOMATED: CPT | Performed by: PHYSICIAN ASSISTANT

## 2019-03-25 PROCEDURE — 99024 POSTOP FOLLOW-UP VISIT: CPT | Performed by: THORACIC SURGERY (CARDIOTHORACIC VASCULAR SURGERY)

## 2019-03-25 PROCEDURE — 63710000001 INSULIN DETEMIR PER 5 UNITS: Performed by: INTERNAL MEDICINE

## 2019-03-25 PROCEDURE — 99233 SBSQ HOSP IP/OBS HIGH 50: CPT | Performed by: INTERNAL MEDICINE

## 2019-03-25 PROCEDURE — 97116 GAIT TRAINING THERAPY: CPT

## 2019-03-25 PROCEDURE — 63710000001 INSULIN LISPRO (HUMAN) PER 5 UNITS: Performed by: INTERNAL MEDICINE

## 2019-03-25 PROCEDURE — 82962 GLUCOSE BLOOD TEST: CPT

## 2019-03-25 RX ORDER — CLOPIDOGREL BISULFATE 75 MG/1
75 TABLET ORAL DAILY
Status: DISCONTINUED | OUTPATIENT
Start: 2019-03-25 | End: 2019-03-28 | Stop reason: HOSPADM

## 2019-03-25 RX ORDER — HYDROCODONE BITARTRATE AND ACETAMINOPHEN 7.5; 325 MG/1; MG/1
1 TABLET ORAL EVERY 12 HOURS PRN
Status: CANCELLED | OUTPATIENT
Start: 2019-03-25

## 2019-03-25 RX ORDER — HYDROCODONE BITARTRATE AND ACETAMINOPHEN 7.5; 325 MG/1; MG/1
1 TABLET ORAL EVERY 12 HOURS PRN
Status: DISCONTINUED | OUTPATIENT
Start: 2019-03-25 | End: 2019-03-25

## 2019-03-25 RX ORDER — HYDROCODONE BITARTRATE AND ACETAMINOPHEN 5; 325 MG/1; MG/1
1 TABLET ORAL EVERY 12 HOURS PRN
Status: DISCONTINUED | OUTPATIENT
Start: 2019-03-25 | End: 2019-03-26

## 2019-03-25 RX ADMIN — DOCUSATE SODIUM AND SENNOSIDES 2 TABLET: 50; 8.6 TABLET ORAL at 20:15

## 2019-03-25 RX ADMIN — LEVOTHYROXINE SODIUM 50 MCG: 50 TABLET ORAL at 05:34

## 2019-03-25 RX ADMIN — ASPIRIN 325 MG: 325 TABLET, COATED ORAL at 08:32

## 2019-03-25 RX ADMIN — INSULIN LISPRO 4 UNITS: 100 INJECTION, SOLUTION INTRAVENOUS; SUBCUTANEOUS at 20:16

## 2019-03-25 RX ADMIN — HYDROCODONE BITARTRATE AND ACETAMINOPHEN 1 TABLET: 5; 325 TABLET ORAL at 12:06

## 2019-03-25 RX ADMIN — DOCUSATE SODIUM AND SENNOSIDES 2 TABLET: 50; 8.6 TABLET ORAL at 08:31

## 2019-03-25 RX ADMIN — HEPARIN SODIUM 5000 UNITS: 5000 INJECTION INTRAVENOUS; SUBCUTANEOUS at 14:19

## 2019-03-25 RX ADMIN — HEPARIN SODIUM 5000 UNITS: 5000 INJECTION INTRAVENOUS; SUBCUTANEOUS at 05:34

## 2019-03-25 RX ADMIN — FAMOTIDINE 20 MG: 20 TABLET ORAL at 08:32

## 2019-03-25 RX ADMIN — ATORVASTATIN CALCIUM 40 MG: 40 TABLET, FILM COATED ORAL at 20:15

## 2019-03-25 RX ADMIN — INSULIN LISPRO 5 UNITS: 100 INJECTION, SOLUTION INTRAVENOUS; SUBCUTANEOUS at 17:56

## 2019-03-25 RX ADMIN — INSULIN LISPRO 4 UNITS: 100 INJECTION, SOLUTION INTRAVENOUS; SUBCUTANEOUS at 12:07

## 2019-03-25 RX ADMIN — FAMOTIDINE 20 MG: 20 TABLET ORAL at 20:15

## 2019-03-25 RX ADMIN — HYDROCODONE BITARTRATE AND ACETAMINOPHEN 1 TABLET: 5; 325 TABLET ORAL at 22:45

## 2019-03-25 RX ADMIN — CLOPIDOGREL BISULFATE 75 MG: 75 TABLET ORAL at 08:31

## 2019-03-25 RX ADMIN — HEPARIN SODIUM 5000 UNITS: 5000 INJECTION INTRAVENOUS; SUBCUTANEOUS at 21:54

## 2019-03-25 RX ADMIN — INSULIN DETEMIR 30 UNITS: 100 INJECTION, SOLUTION SUBCUTANEOUS at 20:16

## 2019-03-25 RX ADMIN — INSULIN LISPRO 2 UNITS: 100 INJECTION, SOLUTION INTRAVENOUS; SUBCUTANEOUS at 17:56

## 2019-03-25 RX ADMIN — INSULIN LISPRO 5 UNITS: 100 INJECTION, SOLUTION INTRAVENOUS; SUBCUTANEOUS at 12:07

## 2019-03-25 RX ADMIN — OXYCODONE HYDROCHLORIDE AND ACETAMINOPHEN 2 TABLET: 5; 325 TABLET ORAL at 03:51

## 2019-03-25 RX ADMIN — INSULIN LISPRO 5 UNITS: 100 INJECTION, SOLUTION INTRAVENOUS; SUBCUTANEOUS at 08:41

## 2019-03-26 LAB
ANION GAP SERPL CALCULATED.3IONS-SCNC: 9 MMOL/L (ref 3–11)
BUN BLD-MCNC: 19 MG/DL (ref 9–23)
BUN/CREAT SERPL: 19.8 (ref 7–25)
CALCIUM SPEC-SCNC: 8.8 MG/DL (ref 8.7–10.4)
CHLORIDE SERPL-SCNC: 104 MMOL/L (ref 99–109)
CO2 SERPL-SCNC: 28 MMOL/L (ref 20–31)
CREAT BLD-MCNC: 0.96 MG/DL (ref 0.6–1.3)
GFR SERPL CREATININE-BSD FRML MDRD: 77 ML/MIN/1.73
GLUCOSE BLD-MCNC: 121 MG/DL (ref 70–100)
GLUCOSE BLDC GLUCOMTR-MCNC: 150 MG/DL (ref 70–130)
GLUCOSE BLDC GLUCOMTR-MCNC: 203 MG/DL (ref 70–130)
GLUCOSE BLDC GLUCOMTR-MCNC: 206 MG/DL (ref 70–130)
GLUCOSE BLDC GLUCOMTR-MCNC: 207 MG/DL (ref 70–130)
MAGNESIUM SERPL-MCNC: 2.2 MG/DL (ref 1.3–2.7)
POTASSIUM BLD-SCNC: 3.7 MMOL/L (ref 3.5–5.5)
SODIUM BLD-SCNC: 141 MMOL/L (ref 132–146)

## 2019-03-26 PROCEDURE — G0108 DIAB MANAGE TRN  PER INDIV: HCPCS

## 2019-03-26 PROCEDURE — 82962 GLUCOSE BLOOD TEST: CPT

## 2019-03-26 PROCEDURE — 99024 POSTOP FOLLOW-UP VISIT: CPT | Performed by: THORACIC SURGERY (CARDIOTHORACIC VASCULAR SURGERY)

## 2019-03-26 PROCEDURE — 80048 BASIC METABOLIC PNL TOTAL CA: CPT | Performed by: PHYSICIAN ASSISTANT

## 2019-03-26 PROCEDURE — 63710000001 INSULIN LISPRO (HUMAN) PER 5 UNITS: Performed by: INTERNAL MEDICINE

## 2019-03-26 PROCEDURE — 25010000002 HEPARIN (PORCINE) PER 1000 UNITS: Performed by: INTERNAL MEDICINE

## 2019-03-26 PROCEDURE — 63710000001 INSULIN DETEMIR PER 5 UNITS: Performed by: INTERNAL MEDICINE

## 2019-03-26 PROCEDURE — 97530 THERAPEUTIC ACTIVITIES: CPT

## 2019-03-26 PROCEDURE — 83735 ASSAY OF MAGNESIUM: CPT | Performed by: THORACIC SURGERY (CARDIOTHORACIC VASCULAR SURGERY)

## 2019-03-26 PROCEDURE — 99232 SBSQ HOSP IP/OBS MODERATE 35: CPT | Performed by: INTERNAL MEDICINE

## 2019-03-26 RX ORDER — SODIUM CHLORIDE 0.9 % (FLUSH) 0.9 %
3-10 SYRINGE (ML) INJECTION EVERY 8 HOURS
Status: DISCONTINUED | OUTPATIENT
Start: 2019-03-26 | End: 2019-03-28 | Stop reason: HOSPADM

## 2019-03-26 RX ORDER — SODIUM CHLORIDE 0.9 % (FLUSH) 0.9 %
3 SYRINGE (ML) INJECTION EVERY 12 HOURS SCHEDULED
Status: DISCONTINUED | OUTPATIENT
Start: 2019-03-26 | End: 2019-03-28 | Stop reason: HOSPADM

## 2019-03-26 RX ORDER — HYDROCODONE BITARTRATE AND ACETAMINOPHEN 5; 325 MG/1; MG/1
1 TABLET ORAL EVERY 4 HOURS PRN
Status: DISCONTINUED | OUTPATIENT
Start: 2019-03-26 | End: 2019-03-28 | Stop reason: HOSPADM

## 2019-03-26 RX ADMIN — FAMOTIDINE 20 MG: 20 TABLET ORAL at 21:32

## 2019-03-26 RX ADMIN — INSULIN LISPRO 5 UNITS: 100 INJECTION, SOLUTION INTRAVENOUS; SUBCUTANEOUS at 08:24

## 2019-03-26 RX ADMIN — HEPARIN SODIUM 5000 UNITS: 5000 INJECTION INTRAVENOUS; SUBCUTANEOUS at 05:34

## 2019-03-26 RX ADMIN — INSULIN DETEMIR 40 UNITS: 100 INJECTION, SOLUTION SUBCUTANEOUS at 22:28

## 2019-03-26 RX ADMIN — HYDROCODONE BITARTRATE AND ACETAMINOPHEN 1 TABLET: 5; 325 TABLET ORAL at 19:53

## 2019-03-26 RX ADMIN — INSULIN LISPRO 4 UNITS: 100 INJECTION, SOLUTION INTRAVENOUS; SUBCUTANEOUS at 16:30

## 2019-03-26 RX ADMIN — LEVOTHYROXINE SODIUM 50 MCG: 50 TABLET ORAL at 05:34

## 2019-03-26 RX ADMIN — INSULIN LISPRO 2 UNITS: 100 INJECTION, SOLUTION INTRAVENOUS; SUBCUTANEOUS at 08:25

## 2019-03-26 RX ADMIN — CLOPIDOGREL BISULFATE 75 MG: 75 TABLET ORAL at 08:23

## 2019-03-26 RX ADMIN — METOPROLOL TARTRATE 12.5 MG: 25 TABLET, FILM COATED ORAL at 21:31

## 2019-03-26 RX ADMIN — OXYCODONE HYDROCHLORIDE AND ACETAMINOPHEN 1 TABLET: 5; 325 TABLET ORAL at 05:40

## 2019-03-26 RX ADMIN — OXYCODONE HYDROCHLORIDE AND ACETAMINOPHEN 2 TABLET: 5; 325 TABLET ORAL at 12:15

## 2019-03-26 RX ADMIN — DOCUSATE SODIUM AND SENNOSIDES 2 TABLET: 50; 8.6 TABLET ORAL at 21:31

## 2019-03-26 RX ADMIN — HEPARIN SODIUM 5000 UNITS: 5000 INJECTION INTRAVENOUS; SUBCUTANEOUS at 15:24

## 2019-03-26 RX ADMIN — INSULIN LISPRO 4 UNITS: 100 INJECTION, SOLUTION INTRAVENOUS; SUBCUTANEOUS at 21:32

## 2019-03-26 RX ADMIN — DOCUSATE SODIUM AND SENNOSIDES 2 TABLET: 50; 8.6 TABLET ORAL at 08:23

## 2019-03-26 RX ADMIN — INSULIN LISPRO 4 UNITS: 100 INJECTION, SOLUTION INTRAVENOUS; SUBCUTANEOUS at 12:17

## 2019-03-26 RX ADMIN — ATORVASTATIN CALCIUM 40 MG: 40 TABLET, FILM COATED ORAL at 21:31

## 2019-03-26 RX ADMIN — ASPIRIN 325 MG: 325 TABLET, COATED ORAL at 08:23

## 2019-03-26 RX ADMIN — FAMOTIDINE 20 MG: 20 TABLET ORAL at 08:23

## 2019-03-26 RX ADMIN — HEPARIN SODIUM 5000 UNITS: 5000 INJECTION INTRAVENOUS; SUBCUTANEOUS at 21:32

## 2019-03-27 LAB
ANION GAP SERPL CALCULATED.3IONS-SCNC: 9 MMOL/L (ref 3–11)
BUN BLD-MCNC: 19 MG/DL (ref 9–23)
BUN/CREAT SERPL: 18.6 (ref 7–25)
CALCIUM SPEC-SCNC: 9 MG/DL (ref 8.7–10.4)
CHLORIDE SERPL-SCNC: 103 MMOL/L (ref 99–109)
CO2 SERPL-SCNC: 27 MMOL/L (ref 20–31)
CREAT BLD-MCNC: 1.02 MG/DL (ref 0.6–1.3)
GFR SERPL CREATININE-BSD FRML MDRD: 71 ML/MIN/1.73
GLUCOSE BLD-MCNC: 170 MG/DL (ref 70–100)
GLUCOSE BLDC GLUCOMTR-MCNC: 193 MG/DL (ref 70–130)
GLUCOSE BLDC GLUCOMTR-MCNC: 209 MG/DL (ref 70–130)
GLUCOSE BLDC GLUCOMTR-MCNC: 213 MG/DL (ref 70–130)
GLUCOSE BLDC GLUCOMTR-MCNC: 273 MG/DL (ref 70–130)
HCT VFR BLD AUTO: 29 % (ref 38.9–50.9)
HGB BLD-MCNC: 9.7 G/DL (ref 13.1–17.5)
POTASSIUM BLD-SCNC: 3.7 MMOL/L (ref 3.5–5.5)
SODIUM BLD-SCNC: 139 MMOL/L (ref 132–146)

## 2019-03-27 PROCEDURE — 99233 SBSQ HOSP IP/OBS HIGH 50: CPT | Performed by: NURSE PRACTITIONER

## 2019-03-27 PROCEDURE — 82962 GLUCOSE BLOOD TEST: CPT

## 2019-03-27 PROCEDURE — 63710000001 INSULIN DETEMIR PER 5 UNITS: Performed by: NURSE PRACTITIONER

## 2019-03-27 PROCEDURE — 85014 HEMATOCRIT: CPT | Performed by: PHYSICIAN ASSISTANT

## 2019-03-27 PROCEDURE — 99024 POSTOP FOLLOW-UP VISIT: CPT | Performed by: THORACIC SURGERY (CARDIOTHORACIC VASCULAR SURGERY)

## 2019-03-27 PROCEDURE — 80048 BASIC METABOLIC PNL TOTAL CA: CPT | Performed by: PHYSICIAN ASSISTANT

## 2019-03-27 PROCEDURE — 85018 HEMOGLOBIN: CPT | Performed by: PHYSICIAN ASSISTANT

## 2019-03-27 PROCEDURE — 97530 THERAPEUTIC ACTIVITIES: CPT

## 2019-03-27 PROCEDURE — 25010000002 HEPARIN (PORCINE) PER 1000 UNITS: Performed by: INTERNAL MEDICINE

## 2019-03-27 RX ORDER — NYSTATIN 100000 [USP'U]/G
POWDER TOPICAL EVERY 12 HOURS SCHEDULED
Status: DISCONTINUED | OUTPATIENT
Start: 2019-03-27 | End: 2019-03-28 | Stop reason: HOSPADM

## 2019-03-27 RX ADMIN — FAMOTIDINE 20 MG: 20 TABLET ORAL at 08:17

## 2019-03-27 RX ADMIN — HEPARIN SODIUM 5000 UNITS: 5000 INJECTION INTRAVENOUS; SUBCUTANEOUS at 14:34

## 2019-03-27 RX ADMIN — CLOPIDOGREL BISULFATE 75 MG: 75 TABLET ORAL at 08:25

## 2019-03-27 RX ADMIN — HYDROCODONE BITARTRATE AND ACETAMINOPHEN 1 TABLET: 5; 325 TABLET ORAL at 21:01

## 2019-03-27 RX ADMIN — INSULIN LISPRO 4 UNITS: 100 INJECTION, SOLUTION INTRAVENOUS; SUBCUTANEOUS at 21:06

## 2019-03-27 RX ADMIN — HEPARIN SODIUM 5000 UNITS: 5000 INJECTION INTRAVENOUS; SUBCUTANEOUS at 05:53

## 2019-03-27 RX ADMIN — DOCUSATE SODIUM AND SENNOSIDES 2 TABLET: 50; 8.6 TABLET ORAL at 21:01

## 2019-03-27 RX ADMIN — METOPROLOL TARTRATE 12.5 MG: 25 TABLET, FILM COATED ORAL at 21:01

## 2019-03-27 RX ADMIN — SODIUM CHLORIDE, PRESERVATIVE FREE 3 ML: 5 INJECTION INTRAVENOUS at 08:17

## 2019-03-27 RX ADMIN — HYDROCODONE BITARTRATE AND ACETAMINOPHEN 1 TABLET: 5; 325 TABLET ORAL at 03:05

## 2019-03-27 RX ADMIN — ATORVASTATIN CALCIUM 40 MG: 40 TABLET, FILM COATED ORAL at 21:01

## 2019-03-27 RX ADMIN — INSULIN LISPRO 2 UNITS: 100 INJECTION, SOLUTION INTRAVENOUS; SUBCUTANEOUS at 12:15

## 2019-03-27 RX ADMIN — METOPROLOL TARTRATE 12.5 MG: 25 TABLET, FILM COATED ORAL at 08:16

## 2019-03-27 RX ADMIN — NYSTATIN: 100000 POWDER TOPICAL at 12:15

## 2019-03-27 RX ADMIN — NYSTATIN: 100000 POWDER TOPICAL at 21:01

## 2019-03-27 RX ADMIN — HEPARIN SODIUM 5000 UNITS: 5000 INJECTION INTRAVENOUS; SUBCUTANEOUS at 21:01

## 2019-03-27 RX ADMIN — SODIUM CHLORIDE, PRESERVATIVE FREE 3 ML: 5 INJECTION INTRAVENOUS at 21:01

## 2019-03-27 RX ADMIN — SODIUM CHLORIDE, PRESERVATIVE FREE 3 ML: 5 INJECTION INTRAVENOUS at 17:48

## 2019-03-27 RX ADMIN — FAMOTIDINE 20 MG: 20 TABLET ORAL at 21:01

## 2019-03-27 RX ADMIN — ASPIRIN 325 MG: 325 TABLET, COATED ORAL at 08:17

## 2019-03-27 RX ADMIN — LEVOTHYROXINE SODIUM 50 MCG: 50 TABLET ORAL at 05:53

## 2019-03-27 RX ADMIN — INSULIN LISPRO 6 UNITS: 100 INJECTION, SOLUTION INTRAVENOUS; SUBCUTANEOUS at 17:48

## 2019-03-27 RX ADMIN — INSULIN DETEMIR 44 UNITS: 100 INJECTION, SOLUTION SUBCUTANEOUS at 20:55

## 2019-03-27 RX ADMIN — INSULIN LISPRO 4 UNITS: 100 INJECTION, SOLUTION INTRAVENOUS; SUBCUTANEOUS at 08:16

## 2019-03-28 VITALS
TEMPERATURE: 97.7 F | WEIGHT: 227 LBS | BODY MASS INDEX: 29.15 KG/M2 | DIASTOLIC BLOOD PRESSURE: 56 MMHG | HEART RATE: 79 BPM | RESPIRATION RATE: 16 BRPM | SYSTOLIC BLOOD PRESSURE: 127 MMHG | OXYGEN SATURATION: 95 %

## 2019-03-28 LAB
ANION GAP SERPL CALCULATED.3IONS-SCNC: 9 MMOL/L (ref 3–11)
BUN BLD-MCNC: 19 MG/DL (ref 9–23)
BUN/CREAT SERPL: 19.8 (ref 7–25)
CALCIUM SPEC-SCNC: 8.8 MG/DL (ref 8.7–10.4)
CHLORIDE SERPL-SCNC: 103 MMOL/L (ref 99–109)
CO2 SERPL-SCNC: 28 MMOL/L (ref 20–31)
CREAT BLD-MCNC: 0.96 MG/DL (ref 0.6–1.3)
GFR SERPL CREATININE-BSD FRML MDRD: 77 ML/MIN/1.73
GLUCOSE BLD-MCNC: 127 MG/DL (ref 70–100)
GLUCOSE BLDC GLUCOMTR-MCNC: 173 MG/DL (ref 70–130)
GLUCOSE BLDC GLUCOMTR-MCNC: 238 MG/DL (ref 70–130)
HCT VFR BLD AUTO: 28.9 % (ref 38.9–50.9)
HGB BLD-MCNC: 9.7 G/DL (ref 13.1–17.5)
POTASSIUM BLD-SCNC: 3.8 MMOL/L (ref 3.5–5.5)
SODIUM BLD-SCNC: 140 MMOL/L (ref 132–146)

## 2019-03-28 PROCEDURE — 25010000002 HEPARIN (PORCINE) PER 1000 UNITS: Performed by: INTERNAL MEDICINE

## 2019-03-28 PROCEDURE — 82962 GLUCOSE BLOOD TEST: CPT

## 2019-03-28 PROCEDURE — 85018 HEMOGLOBIN: CPT | Performed by: PHYSICIAN ASSISTANT

## 2019-03-28 PROCEDURE — 99024 POSTOP FOLLOW-UP VISIT: CPT | Performed by: THORACIC SURGERY (CARDIOTHORACIC VASCULAR SURGERY)

## 2019-03-28 PROCEDURE — 99231 SBSQ HOSP IP/OBS SF/LOW 25: CPT | Performed by: NURSE PRACTITIONER

## 2019-03-28 PROCEDURE — 80048 BASIC METABOLIC PNL TOTAL CA: CPT | Performed by: PHYSICIAN ASSISTANT

## 2019-03-28 PROCEDURE — 85014 HEMATOCRIT: CPT | Performed by: PHYSICIAN ASSISTANT

## 2019-03-28 RX ORDER — ATORVASTATIN CALCIUM 40 MG/1
40 TABLET, FILM COATED ORAL NIGHTLY
Qty: 30 TABLET | Refills: 11 | Status: ON HOLD | OUTPATIENT
Start: 2019-03-28 | End: 2021-07-29

## 2019-03-28 RX ORDER — HYDROCODONE BITARTRATE AND ACETAMINOPHEN 5; 325 MG/1; MG/1
1 TABLET ORAL EVERY 6 HOURS PRN
Qty: 20 TABLET | Refills: 0 | Status: ON HOLD
Start: 2019-03-28 | End: 2022-05-18

## 2019-03-28 RX ORDER — CLOPIDOGREL BISULFATE 75 MG/1
75 TABLET ORAL DAILY
Qty: 30 TABLET | Refills: 4 | Status: SHIPPED | OUTPATIENT
Start: 2019-03-28 | End: 2019-03-28

## 2019-03-28 RX ORDER — CLOPIDOGREL BISULFATE 75 MG/1
75 TABLET ORAL DAILY
Qty: 30 TABLET | Refills: 4 | Status: SHIPPED | OUTPATIENT
Start: 2019-03-28 | End: 2019-08-29 | Stop reason: SDUPTHER

## 2019-03-28 RX ADMIN — HYDROCODONE BITARTRATE AND ACETAMINOPHEN 1 TABLET: 5; 325 TABLET ORAL at 03:11

## 2019-03-28 RX ADMIN — HEPARIN SODIUM 5000 UNITS: 5000 INJECTION INTRAVENOUS; SUBCUTANEOUS at 05:40

## 2019-03-28 RX ADMIN — METOPROLOL TARTRATE 12.5 MG: 25 TABLET, FILM COATED ORAL at 09:22

## 2019-03-28 RX ADMIN — FAMOTIDINE 20 MG: 20 TABLET ORAL at 09:22

## 2019-03-28 RX ADMIN — SODIUM CHLORIDE, PRESERVATIVE FREE 3 ML: 5 INJECTION INTRAVENOUS at 09:23

## 2019-03-28 RX ADMIN — HYDROCODONE BITARTRATE AND ACETAMINOPHEN 1 TABLET: 5; 325 TABLET ORAL at 09:22

## 2019-03-28 RX ADMIN — CLOPIDOGREL BISULFATE 75 MG: 75 TABLET ORAL at 09:22

## 2019-03-28 RX ADMIN — ASPIRIN 325 MG: 325 TABLET, COATED ORAL at 09:22

## 2019-03-28 RX ADMIN — LEVOTHYROXINE SODIUM 50 MCG: 50 TABLET ORAL at 05:40

## 2019-03-28 RX ADMIN — NYSTATIN: 100000 POWDER TOPICAL at 09:22

## 2019-03-28 RX ADMIN — DOCUSATE SODIUM AND SENNOSIDES 2 TABLET: 50; 8.6 TABLET ORAL at 09:22

## 2019-03-28 RX ADMIN — INSULIN LISPRO 2 UNITS: 100 INJECTION, SOLUTION INTRAVENOUS; SUBCUTANEOUS at 08:00

## 2019-03-29 ENCOUNTER — READMISSION MANAGEMENT (OUTPATIENT)
Dept: CALL CENTER | Facility: HOSPITAL | Age: 74
End: 2019-03-29

## 2019-03-30 NOTE — OUTREACH NOTE
Prep Survey      Responses   Facility patient discharged from?  [facility]   Is patient eligible?  Yes   Discharge diagnosis  CABG   Does the patient have one of the following disease processes/diagnoses(primary or secondary)?  Cardiothoracic surgery   Does the patient have Home health ordered?  No   Is there a DME ordered?  Yes   What DME was ordered?  EL[facility]   [facility]   Prep survey completed?  Yes          Nahomy Boone RN

## 2019-04-01 ENCOUNTER — READMISSION MANAGEMENT (OUTPATIENT)
Dept: CALL CENTER | Facility: HOSPITAL | Age: 74
End: 2019-04-01

## 2019-04-01 NOTE — OUTREACH NOTE
CT Surgery Week 1 Survey      Responses   Facility patient discharged from?  [facility]   Does the patient have one of the following disease processes/diagnoses(primary or secondary)?  Cardiothoracic surgery   Is there a successful TCM telephone encounter documented?  No   Week 1 attempt successful?  Yes   Call start time  1003   Call end time  1016   Discharge diagnosis  CABG   Meds reviewed with patient/caregiver?  Yes   Is the patient having any side effects they believe may be caused by any medication additions or changes?  No   Does the patient have all medications related to this admission filled (includes all antibiotics, pain medications, cardiac medications, etc.)  Yes   Is the patient taking all medications as directed (includes completed medication regime)?  Yes   Comments regarding appointments  Cardiac rehab scheduled for April 30    Does the patient have a primary care provider?   Yes   Does the patient have an appointment scheduled with their C/T surgeon?  No [Patient is awaiting on return call from office]   Comments regarding PCP  Dr Painter/ PCP - encouraged patient to call for appt. He verbalized understanding.    What is preventing the patient from scheduling follow up appointments?  Waiting on return call   Nursing Interventions  Educated patient on importance of making appointment, Advised patient to make appointment, Verified appointment date/time/provider   Has the patient kept scheduled appointments due by today?  Yes   Has home health visited the patient within 72 hours of discharge?  N/A   [facility]   Has all DME been delivered?  Yes   Psychosocial issues?  No   Comments  Patient reports he is doing well.    Did the patient receive a copy of their discharge instructions?  Yes   Nursing interventions  Reviewed instructions with patient   What is the patient's perception of their health status since discharge?  Improving   Nursing interventions  Nurse provided patient  education   Is the patient/caregiver able to teach back normal signs of recovery?  Nausea and lack of appetite, Constipation, Depression or irritability, Pain or discomfort at incisional site   Nursing interventions  Reassured on normal signs of recovery   Is the patient /caregiver able to teach back basic post-op care?  Practice 'cough and deep breath', Drive as instructed by MD in discharge instructions, No tub bath, swimming, or hot tub until instructed by MD, Take showers only when approved by MD-sponge bathe until then, Keep incision areas clean, dry and protected, Do not remove steri-strips, Lifting as instructed by MD in discharge instructions   Is the patient/caregiver able to teach back signs and symptoms of incisional infection?  Increased redness, swelling or pain at the incisonal site, Increased drainage or bleeding, Incisional warmth, Pus or odor from incision, Fever   Is the patient/caregiver able to teach back steps to recovery at home?  Set small, achievable goals for return to baseline health, Rest and rebuild strength, gradually increase activity, Make a list of questions for surgeon's appointment   Is the patient /caregiver able to teach back the importance of cardiac rehab?  Yes   Nursing interventions  Provided education on importance of cardiac rehab   Is the patient/caregiver able to teach back the hierarchy of who to call/visit for symptoms/problems? PCP, Specialist, Home health nurse, Urgent Care, ED, 911  Yes   Additional teach back comments  Incision healing without s/s of infection. If SOB, chest pain, increased edema call MD right away.    Week 1 call completed?  Yes            Delgado Oneil RN

## 2019-04-03 ENCOUNTER — TELEPHONE (OUTPATIENT)
Dept: CARDIAC SURGERY | Facility: CLINIC | Age: 74
End: 2019-04-03

## 2019-04-03 NOTE — TELEPHONE ENCOUNTER
Patients wife called to ask if it was okay for patient to take a suppository for constipation. Got confirmation from Mohan Arauz that it is okay to do so.

## 2019-04-08 ENCOUNTER — READMISSION MANAGEMENT (OUTPATIENT)
Dept: CALL CENTER | Facility: HOSPITAL | Age: 74
End: 2019-04-08

## 2019-04-08 NOTE — OUTREACH NOTE
CT Surgery Week 2 Survey      Responses   Facility patient discharged from?  [facility]   Does the patient have one of the following disease processes/diagnoses(primary or secondary)?  Cardiothoracic surgery   Week 2 attempt successful?  No   Unsuccessful attempts  Attempt 1          Glory Navas RN

## 2019-04-08 NOTE — DISCHARGE SUMMARY
CTS Discharge Summary    Patient Care Team:  Farooq Painter MD as PCP - General (Family Medicine)  Jermaine Hernandez MD as Consulting Physician (Cardiology)  Consults:   Consults     No orders found from 2/21/2019 to 3/23/2019.          Date of Admission: 3/22/2019  5:12 AM  Date of Discharge:  3/28/2019    Discharge Diagnosis  Past Medical History:   Diagnosis Date   • Asthma    • Coronary artery disease    • Diabetes mellitus (CMS/HCC) 2000    started on inuslin 12/2018; started on po meds in 2000; checking blood sugars daily    • Disease of thyroid gland     po meds daily for hypothyroidism    • History of fracture as a child     rt leg- severe    • Hyperlipidemia    • Hypertension    • Hypothyroidism    • Peripheral neuropathy    • Peripheral vascular disease (CMS/HCC)     s/p angiogram 2/19-needs stent in left leg    • RBBB    • Right knee pain    • Vitamin D deficiency      Patient Active Problem List   Diagnosis   • Unstable angina (CMS/HCC)   • Multivessel CAD including 40% LM.  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  Hemoglobin A1c 9.0    • Hypertension   • Hyperlipidemia   • Hypothyroidism on Rx    • Vitamin D deficiency on Rx    • Serum Cr 1.32 on admission.  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 Au       Coronary artery disease involving native coronary artery of native heart with other form of angina pectoris (CMS/HCC) [I25.118]  Coronary artery disease involving native coronary artery of native heart with other form of angina pectoris (CMS/HCC) [I25.118]     Procedures Performed  Procedure(s):  MEDIAN STERNOTOMY, CORONARY ARTERY BYPASS GRAFT X3, UTILIZING THE LEFT INTERNAL MAMMARY ARTERY, EVH AND OPEN HARVEST OF THE RIGHT GREATER SAPHENOUS VEIN, EXPLORATION OF THE LEFT LEG      History of Present Illness  Patient is a 74 y.o. male who was referred  To be evaluated for coronary bypass grafting  surgery.  Recent cardiac catheterization demonstrated significant multivessel disease.  The patient himself denies any anginal symptoms whatsoever, but primarily complains of increasing fatigue in the last 3-6 months with some mild worsening shortness of breath with minimal activity.  With rest, his shortness of breath improves.  He has diabetes and it appears poorly controlled with hemoglobin A1c greater than 11.  He is accompanied today by his family to discuss surgery.    Hospital Course  Patient was taken to the operating room on 3/22/19 for CABG x3 with EVH.  Patient was transported to cardiac ICU intubated and in stable condition.  Extubated per ICU protocol.  POD 1:  Waldo removed  POD 2:  Chest tubes and pacing wires removed.  1st degree AV block  POD 3:  Awaiting telemetry bed.  Plavix 75 mg daily started.  Ambulating well.  POD 4:  Transferred to telemetry  POD 6:  Patient met discharge criteria and was discharged to home    Discharge Medications     Discharge Medications      New Medications      Instructions Start Date   atorvastatin 40 MG tablet  Commonly known as:  LIPITOR   40 mg, Oral, Nightly      clopidogrel 75 MG tablet  Commonly known as:  PLAVIX   75 mg, Oral, Daily      HYDROcodone-acetaminophen 5-325 MG per tablet  Commonly known as:  NORCO   1 tablet, Oral, Every 6 Hours PRN      insulin lispro 100 UNIT/ML injection  Commonly known as:  humaLOG   5 Units, Subcutaneous, 3 Times Daily Before Meals      metoprolol tartrate 25 MG tablet  Commonly known as:  LOPRESSOR   12.5 mg, Oral, Every 12 Hours Scheduled         Changes to Medications      Instructions Start Date   insulin detemir 100 UNIT/ML injection  Commonly known as:  LEVEMIR FLEXTOUCH  What changed:  how much to take   44 Units, Subcutaneous, Nightly         Continue These Medications      Instructions Start Date   amLODIPine 5 MG tablet  Commonly known as:  NORVASC   5 mg, Oral, Every Morning      aspirin 81 MG chewable tablet   81 mg,  Oral, Daily      cetirizine 10 MG tablet  Commonly known as:  zyrTEC   10 mg, Oral, Daily      levothyroxine 50 MCG tablet  Commonly known as:  SYNTHROID, LEVOTHROID   50 mcg, Oral, Every Morning      losartan 100 MG tablet  Commonly known as:  COZAAR   100 mg, Oral, Every Morning      sodium chloride 0.65 % nasal spray  Commonly known as:  OCEAN   1 spray, Nasal, As Needed      vitamin D3 5000 units tablet tablet   5,000 Units, Oral, Daily         Stop These Medications    azithromycin 250 MG tablet  Commonly known as:  ZITHROMAX     glipiZIDE 5 MG tablet  Commonly known as:  GLUCOTROL     naproxen 500 MG tablet  Commonly known as:  NAPROSYN            Discharge Diet:   Diet Instructions     Diet: Consistent Carbohydrate, Cardiac      Discharge Diet:   Consistent Carbohydrate  Cardiac             Activity at Discharge:   Activity Instructions     Discharge Activity Restrictions      1) No driving for 4 weeks and no longer taking narcotics.   2) Do not lift / push / pull more than 10 lbs.          Follow-up Appointments  Future Appointments   Date Time Provider Department Center   4/30/2019  9:00 AM ORIENTATION -  [facility]   5/6/2019 10:00 AM Miguel Stapleton PA [facility]           Praveena Loomis PA-C  04/08/19  8:31 AM

## 2019-04-09 ENCOUNTER — READMISSION MANAGEMENT (OUTPATIENT)
Dept: CALL CENTER | Facility: HOSPITAL | Age: 74
End: 2019-04-09

## 2019-04-09 NOTE — OUTREACH NOTE
CT Surgery Week 2 Survey      Responses   Facility patient discharged from?  [facility]   Does the patient have one of the following disease processes/diagnoses(primary or secondary)?  Cardiothoracic surgery   Week 2 attempt successful?  No   Unsuccessful attempts  Attempt 2          Delgado Oneil RN

## 2019-04-15 ENCOUNTER — READMISSION MANAGEMENT (OUTPATIENT)
Dept: CALL CENTER | Facility: HOSPITAL | Age: 74
End: 2019-04-15

## 2019-04-15 NOTE — OUTREACH NOTE
CT Surgery Week 3 Survey      Responses   Facility patient discharged from?  [facility]   Does the patient have one of the following disease processes/diagnoses(primary or secondary)?  Cardiothoracic surgery   Week 3 attempt successful?  No   Unsuccessful attempts  Attempt 1          Florina Willis RN

## 2019-04-17 ENCOUNTER — READMISSION MANAGEMENT (OUTPATIENT)
Dept: CALL CENTER | Facility: HOSPITAL | Age: 74
End: 2019-04-17

## 2019-04-17 NOTE — OUTREACH NOTE
CT Surgery Week 3 Survey      Responses   Facility patient discharged from?  [facility]   Does the patient have one of the following disease processes/diagnoses(primary or secondary)?  Cardiothoracic surgery   Week 3 attempt successful?  No   Unsuccessful attempts  Attempt 2          Norma Burch RN

## 2019-05-06 ENCOUNTER — OFFICE VISIT (OUTPATIENT)
Dept: CARDIAC SURGERY | Facility: CLINIC | Age: 74
End: 2019-05-06

## 2019-05-06 VITALS
HEIGHT: 74 IN | DIASTOLIC BLOOD PRESSURE: 100 MMHG | TEMPERATURE: 97.6 F | OXYGEN SATURATION: 100 % | BODY MASS INDEX: 27.72 KG/M2 | WEIGHT: 216 LBS | HEART RATE: 79 BPM | SYSTOLIC BLOOD PRESSURE: 140 MMHG

## 2019-05-06 DIAGNOSIS — Z95.1 S/P CABG X 3: Primary | ICD-10-CM

## 2019-05-06 PROCEDURE — 99024 POSTOP FOLLOW-UP VISIT: CPT | Performed by: PHYSICIAN ASSISTANT

## 2019-05-06 NOTE — PROGRESS NOTES
05/06/2019  Patient Information  Jermaine Singh                                                                                          [address]  [facility] [address]   1945  'PCP/Referring Physician'  Farooq Painter MD  [phone]  No ref. provider found    Chief Complaint   Patient presents with   • Post-op Follow-up     Hosp D/C CABG 3/22/19 for CAD       History of Present Illness:  Patient is a 74-year-old  male with history of hypertension, hyperlipidemia and coronary artery disease status post CABG x3 performed 3/22/2019 who presents to office for postop follow-up visit.  The patient denies any incisional pain, shortness of breath or difficulty with ambulation.  He was recently evaluated by his cardiologist, Dr. Hernandez, last week and will begin cardiac rehab May 28, 2019 in [place], [place].  The patient is otherwise doing well.    Patient Active Problem List   Diagnosis   • Unstable angina (CMS/[facility])   • Multivessel CAD including 40% LM.  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  Hemoglobin A1c 9.0    • Hypertension   • Hyperlipidemia   • Hypothyroidism on Rx    • Vitamin D deficiency on Rx    • Serum Cr 1.32 on admission.  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 Au     Past Medical History:   Diagnosis Date   • Asthma    • Coronary artery disease    • Diabetes mellitus (CMS/HCC) 2000    started on inuslin 12/2018; started on po meds in 2000; checking blood sugars daily    • Disease of thyroid gland     po meds daily for hypothyroidism    • History of fracture as a child     rt leg- severe    • Hyperlipidemia    • Hypertension    • Hypothyroidism    • Peripheral neuropathy    • Peripheral vascular disease (CMS/[facility])     s/p angiogram 2/19-needs stent in left leg    • RBBB    • Right knee pain    • Vitamin D deficiency      Past Surgical History:   Procedure Laterality Date   •  APPENDECTOMY     • CARDIAC CATHETERIZATION N/A 2/14/2019    Procedure: Left Heart Cath;  Surgeon: Cooper Apodaca MD;  Location:  [facility] CATH INVASIVE LOCATION;  Service: Cardiology   • COLONOSCOPY     • CORONARY ARTERY BYPASS GRAFT N/A 3/22/2019    Procedure: MEDIAN STERNOTOMY, CORONARY ARTERY BYPASS GRAFT X3, UTILIZING THE LEFT INTERNAL MAMMARY ARTERY, EVH AND OPEN HARVEST OF THE RIGHT GREATER SAPHENOUS VEIN, EXPLORATION OF THE LEFT LEG;  Surgeon: Ap Au MD;  Location:  [facility] OR;  Service: Cardiothoracic   • EYE SURGERY Bilateral     cataracts    • KNEE ARTHROSCOPY      right x 2, left x 1   • LACERATION REPAIR      right leg   • LEG SURGERY      2 for fracture of rt leg    • TONSILLECTOMY      Adnoidectomy       Current Outpatient Medications:   •  aspirin 81 MG chewable tablet, Chew 81 mg Daily., Disp: , Rfl:   •  atorvastatin (LIPITOR) 40 MG tablet, Take 1 tablet by mouth Every Night., Disp: 30 tablet, Rfl: 11  •  cetirizine (zyrTEC) 10 MG tablet, Take 10 mg by mouth Daily., Disp: , Rfl:   •  Cholecalciferol (VITAMIN D3) 5000 units tablet tablet, Take 5,000 Units by mouth Daily., Disp: , Rfl:   •  clopidogrel (PLAVIX) 75 MG tablet, Take 1 tablet by mouth Daily., Disp: 30 tablet, Rfl: 4  •  levothyroxine (SYNTHROID, LEVOTHROID) 50 MCG tablet, Take 50 mcg by mouth Every Morning., Disp: , Rfl:   •  losartan (COZAAR) 100 MG tablet, Take 100 mg by mouth Every Morning., Disp: , Rfl:   •  amLODIPine (NORVASC) 5 MG tablet, Take 5 mg by mouth Every Morning., Disp: , Rfl:   •  HYDROcodone-acetaminophen (NORCO) 5-325 MG per tablet, Take 1 tablet by mouth Every 6 (Six) Hours As Needed for Moderate Pain ., Disp: 20 tablet, Rfl: 0  •  insulin detemir (LEVEMIR FLEXTOUCH) 100 UNIT/ML injection, Inject 44 Units under the skin into the appropriate area as directed Every Night., Disp: , Rfl:   •  insulin lispro (humaLOG) 100 UNIT/ML injection, Inject 5 Units under the skin into the appropriate area as directed 3  "(Three) Times a Day Before Meals., Disp: 1 each, Rfl: 1  •  metoprolol tartrate (LOPRESSOR) 25 MG tablet, Take 0.5 tablets by mouth Every 12 (Twelve) Hours., Disp: 60 tablet, Rfl: 11  •  sodium chloride (OCEAN) 0.65 % nasal spray, 1 spray into the nostril(s) as directed by provider As Needed for Congestion., Disp: , Rfl:   No current facility-administered medications for this visit.     Facility-Administered Medications Ordered in Other Visits:   •  Chlorhexidine Gluconate Cloth 2 % pads 1 application, 1 application, Topical, Q12H PRN, Mohan Arauz PA  No Known Allergies  Social History     Socioeconomic History   • Marital status:      Spouse name: Not on file   • Number of children: 2   • Years of education: Not on file   • Highest education level: Not on file   Occupational History   • Occupation: Sales at [employer]   Tobacco Use   • Smoking status: Never Smoker   • Smokeless tobacco: Never Used   Substance and Sexual Activity   • Alcohol use: No   • Drug use: No   • Sexual activity: Defer   Social History Narrative    Lives in [place].     Family History   Problem Relation Age of Onset   • Coronary artery disease Mother    • Diabetes Mother    • Cancer Father      ROS: As per HPI, otherwise negative.  Vitals:    19 1020   BP: 140/100   BP Location: Right arm   Patient Position: Sitting   Pulse: 79   Temp: 97.6 °F (36.4 °C)   SpO2: 100%   Weight: 98 kg (216 lb)   Height: 188 cm (74\")      Physical Exam:   Gen - NAD, pleasant, cooperative  CV - Regular rate and rhythm, no murmur gallop or rub  Pulm - Lungs clear to auscultation without wheeze or rhonchi   GI - Soft, normoactive bowel sounds, non-tender  Ext - Without edema, EVH site healing well  Incision - Sternum stable, no evidence of incisional dehiscence or cellulitis  Neuro - CN II - XII grossly intact, tongue midline, voice normal    Labs/Imagin2019 chest x-ray  COMPARISON: 2019     FINDINGS: Two-view chest reveals " patient to be status post median  sternotomy. There is linear atelectatic changes seen within the left  midlung. No focal parenchymal opacification present.  No pleural  effusion or pneumothorax. Degenerative changes seen within the spine.           IMPRESSION:  Chronic changes seen within the left lung base with no  evidence of acute parenchymal disease.    Assessment/Plan:  Patient is a 74-year-old  male with history of hypertension, hyperlipidemia and coronary artery disease status post CABG x3 performed 3/22/2019 who presents to office for postop follow-up visit.  The patient is having a steady postoperative course.  His incisions are healing well, his sternum is stable and his chest x-ray is within normal limits.  Of note, the patient's blood pressure was 140/100 today in office, for which I instructed him to contact Dr. Hernandez's office and relay this information for possible medical management changes.  The patient is otherwise doing well, and may begin driving as long as he is not experiencing any dizziness or using pain medication. The patient may follow up as needed, but should call with any questions or concerns should any arise during the interval. If the patient experiences any acultey worsening symptoms, they should present to the nearest emergency department possible.     Patient Active Problem List   Diagnosis   • Unstable angina (CMS/HCC)   • Multivessel CAD including 40% LM.  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  Hemoglobin A1c 9.0    • Hypertension   • Hyperlipidemia   • Hypothyroidism on Rx    • Vitamin D deficiency on Rx    • Serum Cr 1.32 on admission.  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 Au

## 2019-05-28 ENCOUNTER — TREATMENT (OUTPATIENT)
Dept: CARDIAC REHAB | Facility: HOSPITAL | Age: 74
End: 2019-05-28

## 2019-05-28 VITALS
DIASTOLIC BLOOD PRESSURE: 78 MMHG | HEIGHT: 74 IN | SYSTOLIC BLOOD PRESSURE: 136 MMHG | WEIGHT: 212 LBS | BODY MASS INDEX: 27.21 KG/M2 | HEART RATE: 80 BPM

## 2019-05-28 DIAGNOSIS — Z95.1 S/P CABG X 3: ICD-10-CM

## 2019-05-28 PROCEDURE — 93798 PHYS/QHP OP CAR RHAB W/ECG: CPT

## 2019-05-28 NOTE — PROGRESS NOTES
Cardiac Rehab Initial Assessment      Name: Jermaine Singh  :1945 Allergies:Patient has no known allergies.   MRN: [id number] 74 y.o. Physician: Farooq Painter MD   Primary Diagnosis:    Diagnosis Plan   1. S/P CABG x 3      Event Date: 3/21/2019 Specialist: Dr. Apodaca   Secondary Diagnosis: DM Risk Stratification:High Risk Note Author: Shirin Molina RN     Cardiovascular History: Comments none     EXERCISE AT HOME  yes  20min  6 days per week    EF: 60%      Source: cath          Ambulatory Status:Independent  Ambulatory Fall Risk Assessed on Initial Visit: no 6 Minute Walk Pre- Cardiac Rehab:  Distance:1120ft      RPE:11  Max. HR: 88       SPO2:na    MET: 2.6  MPH: na             RPD: na  Resting BP: na LA, na 136/78    Peak BP: 160/88  Recovery BP: 118/72  Comments: na      NUTRITION  Lipids:yes If yes, labs as follows;  Total: No components found for: CHOLESTEROL  HDL: No results found for: HDL Lipids continued:  LDL:No results found for: LDL  Triglyceride: No components found for: TRIGLYCERIDE   Weight Management:                 Weight: 212 lbs  Height: 74 inches                                   BMI: There is no height or weight on file to calculate BMI.  Waist Circumference: na  inches   Alcohol Use: none Diabetes:Yes,  Monitors BS at home- yes, Frequency: 1 time daily, Random BS: 97    Last HGBA1C with date if applicable:No components found for: A1C         SOCIAL HISTORY  Social History     Socioeconomic History   • Marital status:      Spouse name: Not on file   • Number of children: 2   • Years of education: Not on file   • Highest education level: Not on file   Occupational History   • Occupation: Sales at [employer]   Tobacco Use   • Smoking status: Never Smoker   • Smokeless tobacco: Never Used   Substance and Sexual Activity   • Alcohol use: No   • Drug use: No   • Sexual activity: Defer   Social History Narrative    Lives in [place].       Educational Level (choose one  that applies) post college graduate work or degree Learning Barriers:Ready to Learn    Family Support:yes    Living Arrangement: lives with their spouse    Risk Factors: Hyperlipidemia  Yes, Diabetes  Yes If Yes: Do you check blood glucose daily  Yes Today's glucose level 97 and Obesity  Yes     Tobacco Adjunct: N/A  na      Comorbidities: Diabetes Mellitus     PSYCHOSOCIAL  Clinical Depression: no    Stress: no     Assess presence or absence of depression using a valid screening tool: yes      PHYSICAL ASSESSMENT  Influenza vaccine: yes  Pneumococcal vaccine: yes          Angina: no    Describe angina scale of 0 - 4: 0 = none    Today are you having incisional pain? Yes. If, Yes, Scale: 2    na    Today are you having any other pain? No. If, Yes, Scale: 0    na Diagnosed with Hypertension:yes    Heart Sounds: S1S2     Lung Sounds: normal air entry, lungs clear to auscultation         Assessment: na Orthopedic Problems: right knee needs replaced    Are you being hurt, hit, or frightened by anyone at home or in your life? no    Are you being neglected by a caregiver? No Shoulder flexibility/Range of motion: Average     Recommended arm activity: Arm Ergometer    Chair sit and reach within: 0 inches   Leg flexibility: Average    Leg Strength/Balance/Five times sit to stand: 0 seconds.   na  Chose one: Fall Risk    Recommended stretching: Chair    Assessment: na    Family attends IA: no Time of arrival: 0915  Time of departure: 1045     Patient Goals: Exercise 150 minutes per week by discharge         5/28/2019  9:51 AM  Shirin Molina RN

## 2019-05-30 ENCOUNTER — TREATMENT (OUTPATIENT)
Dept: CARDIAC REHAB | Facility: HOSPITAL | Age: 74
End: 2019-05-30

## 2019-05-30 DIAGNOSIS — Z95.1 S/P CABG X 3: Primary | ICD-10-CM

## 2019-05-30 PROCEDURE — 93798 PHYS/QHP OP CAR RHAB W/ECG: CPT

## 2019-05-31 ENCOUNTER — TREATMENT (OUTPATIENT)
Dept: CARDIAC REHAB | Facility: HOSPITAL | Age: 74
End: 2019-05-31

## 2019-05-31 ENCOUNTER — TRANSCRIBE ORDERS (OUTPATIENT)
Dept: ADMINISTRATIVE | Facility: HOSPITAL | Age: 74
End: 2019-05-31

## 2019-05-31 DIAGNOSIS — I70.219 EXTREMITY ATHEROSCLEROSIS WITH INTERMITTENT CLAUDICATION (HCC): Primary | ICD-10-CM

## 2019-05-31 DIAGNOSIS — Z95.1 S/P CABG X 3: Primary | ICD-10-CM

## 2019-05-31 PROCEDURE — 93798 PHYS/QHP OP CAR RHAB W/ECG: CPT

## 2019-05-31 NOTE — PROGRESS NOTES
Attended Phase II Cardiac Rehab. No medication or health history changes reported. See [facility] for details.

## 2019-06-03 ENCOUNTER — TREATMENT (OUTPATIENT)
Dept: CARDIAC REHAB | Facility: HOSPITAL | Age: 74
End: 2019-06-03

## 2019-06-03 DIAGNOSIS — Z95.1 S/P CABG X 3: Primary | ICD-10-CM

## 2019-06-03 PROCEDURE — 93798 PHYS/QHP OP CAR RHAB W/ECG: CPT

## 2019-06-03 NOTE — PROGRESS NOTES
Attended Phase II Cardiac Rehab. No medication or health history changes reported. See [facility] for details.

## 2019-06-05 ENCOUNTER — TELEPHONE (OUTPATIENT)
Dept: CARDIAC SURGERY | Facility: CLINIC | Age: 74
End: 2019-06-05

## 2019-06-05 NOTE — TELEPHONE ENCOUNTER
Pt has called today to see if he can have  longer than 12 weeks to return to work. Will put a note on Roms desk

## 2019-06-06 ENCOUNTER — TELEPHONE (OUTPATIENT)
Dept: CARDIAC SURGERY | Facility: CLINIC | Age: 74
End: 2019-06-06

## 2019-06-06 NOTE — TELEPHONE ENCOUNTER
Mr. Singh called stating when he goes to cardiac rehab that his B/p drops to 98/68 before he is finished with his exercises. He states he is taking amlodipine in the morning prior to going to cardiac rehab. I instructed Mr. Singh to contact his Cardiologist regarding his B/p. He verbalized understanding and states he will call.

## 2019-06-07 ENCOUNTER — HOSPITAL ENCOUNTER (OUTPATIENT)
Dept: CT IMAGING | Facility: HOSPITAL | Age: 74
Discharge: HOME | End: 2019-06-07
Admitting: INTERNAL MEDICINE

## 2019-06-07 DIAGNOSIS — I70.219 EXTREMITY ATHEROSCLEROSIS WITH INTERMITTENT CLAUDICATION (HCC): ICD-10-CM

## 2019-06-07 LAB — CREAT BLDA-MCNC: 1.2 MG/DL (ref 0.6–1.3)

## 2019-06-07 PROCEDURE — 75635 CT ANGIO ABDOMINAL ARTERIES: CPT

## 2019-06-07 PROCEDURE — 0 IOPAMIDOL PER 1 ML: Performed by: INTERNAL MEDICINE

## 2019-06-07 PROCEDURE — 82565 ASSAY OF CREATININE: CPT

## 2019-06-07 RX ADMIN — IOPAMIDOL 135 ML: 755 INJECTION, SOLUTION INTRAVENOUS at 15:32

## 2019-06-10 ENCOUNTER — TREATMENT (OUTPATIENT)
Dept: CARDIAC REHAB | Facility: HOSPITAL | Age: 74
End: 2019-06-10

## 2019-06-10 DIAGNOSIS — Z95.1 S/P CABG X 3: Primary | ICD-10-CM

## 2019-06-10 PROCEDURE — 93798 PHYS/QHP OP CAR RHAB W/ECG: CPT

## 2019-06-12 ENCOUNTER — TREATMENT (OUTPATIENT)
Dept: CARDIAC REHAB | Facility: HOSPITAL | Age: 74
End: 2019-06-12

## 2019-06-12 DIAGNOSIS — Z95.1 S/P CABG X 3: Primary | ICD-10-CM

## 2019-06-12 PROCEDURE — 93798 PHYS/QHP OP CAR RHAB W/ECG: CPT

## 2019-06-14 ENCOUNTER — TREATMENT (OUTPATIENT)
Dept: CARDIAC REHAB | Facility: HOSPITAL | Age: 74
End: 2019-06-14

## 2019-06-14 DIAGNOSIS — Z95.1 S/P CABG X 3: Primary | ICD-10-CM

## 2019-06-14 PROCEDURE — 93798 PHYS/QHP OP CAR RHAB W/ECG: CPT

## 2019-06-17 ENCOUNTER — TREATMENT (OUTPATIENT)
Dept: CARDIAC REHAB | Facility: HOSPITAL | Age: 74
End: 2019-06-17

## 2019-06-17 DIAGNOSIS — Z95.1 S/P CABG X 3: Primary | ICD-10-CM

## 2019-06-17 PROCEDURE — 93798 PHYS/QHP OP CAR RHAB W/ECG: CPT

## 2019-06-19 ENCOUNTER — TREATMENT (OUTPATIENT)
Dept: CARDIAC REHAB | Facility: HOSPITAL | Age: 74
End: 2019-06-19

## 2019-06-19 DIAGNOSIS — Z95.1 S/P CABG X 3: Primary | ICD-10-CM

## 2019-06-19 PROCEDURE — 93798 PHYS/QHP OP CAR RHAB W/ECG: CPT

## 2019-06-21 ENCOUNTER — TREATMENT (OUTPATIENT)
Dept: CARDIAC REHAB | Facility: HOSPITAL | Age: 74
End: 2019-06-21

## 2019-06-21 DIAGNOSIS — Z95.1 S/P CABG X 3: Primary | ICD-10-CM

## 2019-06-21 PROCEDURE — 93798 PHYS/QHP OP CAR RHAB W/ECG: CPT

## 2019-06-24 ENCOUNTER — TREATMENT (OUTPATIENT)
Dept: CARDIAC REHAB | Facility: HOSPITAL | Age: 74
End: 2019-06-24

## 2019-06-24 DIAGNOSIS — Z95.1 S/P CABG X 3: Primary | ICD-10-CM

## 2019-06-24 PROCEDURE — 93798 PHYS/QHP OP CAR RHAB W/ECG: CPT

## 2019-06-24 NOTE — PROGRESS NOTES
Attended Phase II Cardiac Rehab. No medication or health history changes reported. See [facility] for details.

## 2019-06-26 ENCOUNTER — TREATMENT (OUTPATIENT)
Dept: CARDIAC REHAB | Facility: HOSPITAL | Age: 74
End: 2019-06-26

## 2019-06-26 DIAGNOSIS — Z95.1 S/P CABG X 3: Primary | ICD-10-CM

## 2019-06-26 PROCEDURE — 93798 PHYS/QHP OP CAR RHAB W/ECG: CPT

## 2019-06-28 ENCOUNTER — TREATMENT (OUTPATIENT)
Dept: CARDIAC REHAB | Facility: HOSPITAL | Age: 74
End: 2019-06-28

## 2019-06-28 DIAGNOSIS — Z95.1 S/P CABG X 3: Primary | ICD-10-CM

## 2019-06-28 PROCEDURE — 93798 PHYS/QHP OP CAR RHAB W/ECG: CPT

## 2019-06-28 NOTE — PROGRESS NOTES
Attended Phase II Cardiac Rehab. No medication or health history changes reported. See [facility] for details.

## 2019-07-11 ENCOUNTER — TELEPHONE (OUTPATIENT)
Dept: CARDIAC REHAB | Facility: HOSPITAL | Age: 74
End: 2019-07-11

## 2019-07-11 NOTE — TELEPHONE ENCOUNTER
Staff left message with Patient in regards to extended absence from 6/28/19. Staff left message stating that if Patient does not contact staff or return to program by Friday, July 26th staff will discharge Patient from the program.

## 2019-07-25 ENCOUNTER — TELEPHONE (OUTPATIENT)
Dept: CARDIAC REHAB | Facility: HOSPITAL | Age: 74
End: 2019-07-25

## 2019-07-25 NOTE — TELEPHONE ENCOUNTER
Staff contacted Patient again in regards to extended absence from the program. Staff left message with Patient stating that he has not been in the program since 6/28/19 and will be discharged from the program at this time. If Patient has any questions he can contact Staff.

## 2019-07-26 ENCOUNTER — APPOINTMENT (OUTPATIENT)
Dept: CARDIAC REHAB | Facility: HOSPITAL | Age: 74
End: 2019-07-26

## 2019-07-29 ENCOUNTER — APPOINTMENT (OUTPATIENT)
Dept: CARDIAC REHAB | Facility: HOSPITAL | Age: 74
End: 2019-07-29

## 2019-07-31 ENCOUNTER — APPOINTMENT (OUTPATIENT)
Dept: CARDIAC REHAB | Facility: HOSPITAL | Age: 74
End: 2019-07-31

## 2019-08-02 ENCOUNTER — APPOINTMENT (OUTPATIENT)
Dept: CARDIAC REHAB | Facility: HOSPITAL | Age: 74
End: 2019-08-02

## 2019-08-05 ENCOUNTER — APPOINTMENT (OUTPATIENT)
Dept: CARDIAC REHAB | Facility: HOSPITAL | Age: 74
End: 2019-08-05

## 2019-08-07 ENCOUNTER — APPOINTMENT (OUTPATIENT)
Dept: CARDIAC REHAB | Facility: HOSPITAL | Age: 74
End: 2019-08-07

## 2019-08-09 ENCOUNTER — APPOINTMENT (OUTPATIENT)
Dept: CARDIAC REHAB | Facility: HOSPITAL | Age: 74
End: 2019-08-09

## 2019-08-12 ENCOUNTER — APPOINTMENT (OUTPATIENT)
Dept: CARDIAC REHAB | Facility: HOSPITAL | Age: 74
End: 2019-08-12

## 2019-08-14 ENCOUNTER — APPOINTMENT (OUTPATIENT)
Dept: CARDIAC REHAB | Facility: HOSPITAL | Age: 74
End: 2019-08-14

## 2019-08-16 ENCOUNTER — APPOINTMENT (OUTPATIENT)
Dept: CARDIAC REHAB | Facility: HOSPITAL | Age: 74
End: 2019-08-16

## 2019-08-19 ENCOUNTER — APPOINTMENT (OUTPATIENT)
Dept: CARDIAC REHAB | Facility: HOSPITAL | Age: 74
End: 2019-08-19

## 2019-08-21 ENCOUNTER — APPOINTMENT (OUTPATIENT)
Dept: CARDIAC REHAB | Facility: HOSPITAL | Age: 74
End: 2019-08-21

## 2019-08-23 ENCOUNTER — APPOINTMENT (OUTPATIENT)
Dept: CARDIAC REHAB | Facility: HOSPITAL | Age: 74
End: 2019-08-23

## 2019-08-26 ENCOUNTER — APPOINTMENT (OUTPATIENT)
Dept: CARDIAC REHAB | Facility: HOSPITAL | Age: 74
End: 2019-08-26

## 2019-08-28 ENCOUNTER — APPOINTMENT (OUTPATIENT)
Dept: CARDIAC REHAB | Facility: HOSPITAL | Age: 74
End: 2019-08-28

## 2019-08-30 ENCOUNTER — APPOINTMENT (OUTPATIENT)
Dept: CARDIAC REHAB | Facility: HOSPITAL | Age: 74
End: 2019-08-30

## 2019-09-04 ENCOUNTER — APPOINTMENT (OUTPATIENT)
Dept: CARDIAC REHAB | Facility: HOSPITAL | Age: 74
End: 2019-09-04

## 2019-09-04 RX ORDER — CLOPIDOGREL BISULFATE 75 MG/1
TABLET ORAL
Qty: 90 TABLET | Refills: 4 | Status: ON HOLD | OUTPATIENT
Start: 2019-09-04 | End: 2021-07-29

## 2019-09-06 ENCOUNTER — APPOINTMENT (OUTPATIENT)
Dept: CARDIAC REHAB | Facility: HOSPITAL | Age: 74
End: 2019-09-06

## 2019-09-09 ENCOUNTER — APPOINTMENT (OUTPATIENT)
Dept: CARDIAC REHAB | Facility: HOSPITAL | Age: 74
End: 2019-09-09

## 2019-09-11 ENCOUNTER — APPOINTMENT (OUTPATIENT)
Dept: CARDIAC REHAB | Facility: HOSPITAL | Age: 74
End: 2019-09-11

## 2020-07-09 ENCOUNTER — OFFICE VISIT (OUTPATIENT)
Dept: NEUROLOGY | Facility: CLINIC | Age: 75
End: 2020-07-09

## 2020-07-09 ENCOUNTER — TELEPHONE (OUTPATIENT)
Dept: NEUROLOGY | Facility: CLINIC | Age: 75
End: 2020-07-09

## 2020-07-09 VITALS
SYSTOLIC BLOOD PRESSURE: 138 MMHG | HEART RATE: 78 BPM | HEIGHT: 74 IN | WEIGHT: 214 LBS | OXYGEN SATURATION: 95 % | DIASTOLIC BLOOD PRESSURE: 74 MMHG | BODY MASS INDEX: 27.46 KG/M2

## 2020-07-09 DIAGNOSIS — R26.89 BALANCE PROBLEM: Primary | ICD-10-CM

## 2020-07-09 PROBLEM — R42 DIZZINESS: Status: ACTIVE | Noted: 2020-07-09

## 2020-07-09 PROCEDURE — 99204 OFFICE O/P NEW MOD 45 MIN: CPT | Performed by: PSYCHIATRY & NEUROLOGY

## 2020-07-09 RX ORDER — BLOOD SUGAR DIAGNOSTIC
1 STRIP MISCELLANEOUS 2 TIMES DAILY
Status: ON HOLD | COMMUNITY
Start: 2020-05-15 | End: 2021-07-29

## 2020-07-09 RX ORDER — PEN NEEDLE, DIABETIC 32GX 5/32"
NEEDLE, DISPOSABLE MISCELLANEOUS
Status: ON HOLD | COMMUNITY
Start: 2020-06-22 | End: 2022-10-02

## 2020-07-09 RX ORDER — PROCHLORPERAZINE 25 MG/1
SUPPOSITORY RECTAL
Status: ON HOLD | COMMUNITY
Start: 2020-05-15 | End: 2022-10-02

## 2020-07-09 RX ORDER — PROCHLORPERAZINE 25 MG/1
SUPPOSITORY RECTAL SEE ADMIN INSTRUCTIONS
Status: ON HOLD | COMMUNITY
Start: 2020-06-30 | End: 2022-10-02

## 2020-07-09 RX ORDER — PROCHLORPERAZINE 25 MG/1
SUPPOSITORY RECTAL
Status: ON HOLD | COMMUNITY
Start: 2020-07-02 | End: 2022-10-02

## 2020-07-09 RX ORDER — BLOOD-GLUCOSE METER
EACH MISCELLANEOUS
Status: ON HOLD | COMMUNITY
Start: 2020-05-15 | End: 2022-10-02

## 2020-07-09 RX ORDER — LANCETS 30 GAUGE
EACH MISCELLANEOUS
Status: ON HOLD | COMMUNITY
Start: 2020-05-15 | End: 2021-07-29

## 2020-07-09 NOTE — TELEPHONE ENCOUNTER
Called and spoke to Golden with [facility] and requested pt MRI brain report as informed by pt of location. Golden stated that pt was scheduled MRI and canceled but have CT of abdomen and will fax report.      Called pt to inform that [facility] didn't have MRI and inquired of location which pt stated that he believe it may have been completed at [facility].    Called and spoke to Simeon with [facility] and the did have pt  MRI brain and requested report faxed to office. Simeon stated verbal understanding. Thanks.

## 2020-07-09 NOTE — PROGRESS NOTES
Subjective:    CC: Jermaine Singh is seen today in consultation at the request of Haritha Hines* for Gait Problem       HPI:  75-year-old male with a past medical history of CAD status post CABG, diabetes mellitus type 2, hypertension presents with balance problems.  As per patient he had his open heart surgery last year in March.  A few months after that he started to have lightheadedness and dizziness on standing up and walking as well as balance problems where he staggers when he gets up there he has to hold onto something.  He also has fatigue but denies any syncopal episodes or falls.  He has been on metoprolol and losartan however the losartan was recently stopped as his blood pressure was normal.  With regards to his diabetes his medications were recently adjusted however his blood sugar still fluctuates a lot and ranges between 100- 200.  Last A1c done last year was 9.  He had a carotid Doppler recently and was told that was normal.  Also had a CAT scan which showed a chronic infarct but no acute changes.     The following portions of the patient's history were reviewed today and updated as of 07/09/2020  : allergies, current medications, past family history, past medical history, past social history, past surgical history and problem list  These document will be scanned to patient's chart.      Current Outpatient Medications:   •  amLODIPine (NORVASC) 5 MG tablet, Take 5 mg by mouth Every Morning., Disp: , Rfl:   •  aspirin 81 MG chewable tablet, Chew 81 mg Daily., Disp: , Rfl:   •  atorvastatin (LIPITOR) 40 MG tablet, Take 1 tablet by mouth Every Night., Disp: 30 tablet, Rfl: 11  •  Blood Glucose Monitoring Suppl (ONE TOUCH ULTRA 2) w/Device kit, USE TO CHECK GLUCOSE ONCE DAILY AS DIRECTED, Disp: , Rfl:   •  cetirizine (zyrTEC) 10 MG tablet, Take 10 mg by mouth Daily., Disp: , Rfl:   •  Cholecalciferol (VITAMIN D3) 5000 units tablet tablet, Take 5,000 Units by mouth Daily., Disp: , Rfl:   •   Continuous Blood Gluc  (DEXCOM G6 ) device, See Admin Instructions., Disp: , Rfl:   •  Continuous Blood Gluc Sensor (DEXCOM G6 SENSOR), APPLY 1 SENSOR AND CHANGE EVERY 10 DAYS, Disp: , Rfl:   •  Continuous Blood Gluc Transmit (DEXCOM G6 TRANSMITTER) misc, ONE TRANSMITTER FOR EVERY 3 MONTHS, Disp: , Rfl:   •  HYDROcodone-acetaminophen (NORCO) 5-325 MG per tablet, Take 1 tablet by mouth Every 6 (Six) Hours As Needed for Moderate Pain ., Disp: 20 tablet, Rfl: 0  •  insulin detemir (LEVEMIR FLEXTOUCH) 100 UNIT/ML injection, Inject 44 Units under the skin into the appropriate area as directed Every Night. (Patient taking differently: Inject 46 Units under the skin into the appropriate area as directed Every Night.), Disp: , Rfl:   •  insulin lispro (humaLOG) 100 UNIT/ML injection, Inject 5 Units under the skin into the appropriate area as directed 3 (Three) Times a Day Before Meals., Disp: 1 each, Rfl: 1  •  Lancets (ONETOUCH DELICA PLUS [id number]) misc, USE 1 TO CHECK GLUCOSE TWICE DAILY, Disp: , Rfl:   •  levothyroxine (SYNTHROID, LEVOTHROID) 50 MCG tablet, Take 50 mcg by mouth Every Morning., Disp: , Rfl:   •  MM PEN NEEDLES 32G X 4 MM misc, USE 4 PER DAY, Disp: , Rfl:   •  ONETOUCH ULTRA test strip, 1 each by Other route 2 (Two) Times a Day. to check glucose, Disp: , Rfl:   •  sodium chloride (OCEAN) 0.65 % nasal spray, 1 spray into the nostril(s) as directed by provider As Needed for Congestion., Disp: , Rfl:   •  clopidogrel (PLAVIX) 75 MG tablet, TAKE 1 TABLET BY MOUTH ONCE DAILY, Disp: 90 tablet, Rfl: 4  •  losartan (COZAAR) 100 MG tablet, Take 100 mg by mouth Every Morning., Disp: , Rfl:   •  metoprolol tartrate (LOPRESSOR) 25 MG tablet, Take 0.5 tablets by mouth Every 12 (Twelve) Hours., Disp: 60 tablet, Rfl: 11  No current facility-administered medications for this visit.     Facility-Administered Medications Ordered in Other Visits:   •  Chlorhexidine Gluconate Cloth 2 % pads 1 application, 1  application, Topical, Q12H TREY, Mohan Arauz PA   Past Medical History:   Diagnosis Date   • Asthma    • Coronary artery disease    • Diabetes mellitus (CMS/HCC) 2000    started on inuslin 12/2018; started on po meds in 2000; checking blood sugars daily    • Disease of thyroid gland     po meds daily for hypothyroidism    • History of fracture as a child     rt leg- severe    • Hyperlipidemia    • Hypertension    • Hypothyroidism    • Peripheral neuropathy    • Peripheral vascular disease (CMS/HCC)     s/p angiogram 2/19-needs stent in left leg    • RBBB    • Right knee pain    • Vitamin D deficiency       Past Surgical History:   Procedure Laterality Date   • APPENDECTOMY     • CARDIAC CATHETERIZATION N/A 2/14/2019    Procedure: Left Heart Cath;  Surgeon: Cooper Apodaca MD;  Location:  [facility] CATH INVASIVE LOCATION;  Service: Cardiology   • COLONOSCOPY     • CORONARY ARTERY BYPASS GRAFT N/A 3/22/2019    Procedure: MEDIAN STERNOTOMY, CORONARY ARTERY BYPASS GRAFT X3, UTILIZING THE LEFT INTERNAL MAMMARY ARTERY, EVH AND OPEN HARVEST OF THE RIGHT GREATER SAPHENOUS VEIN, EXPLORATION OF THE LEFT LEG;  Surgeon: Ap Au MD;  Location: [facility];  Service: Cardiothoracic   • EYE SURGERY Bilateral     cataracts    • KNEE ARTHROSCOPY      right x 2, left x 1   • LACERATION REPAIR      right leg   • LEG SURGERY      2 for fracture of rt leg    • TONSILLECTOMY      Adnoidectomy      Family History   Problem Relation Age of Onset   • Coronary artery disease Mother    • Diabetes Mother    • Cancer Father       Social History     Socioeconomic History   • Marital status:      Spouse name: Not on file   • Number of children: 2   • Years of education: Not on file   • Highest education level: Not on file   Occupational History   • Occupation: Sales at [employer]   Tobacco Use   • Smoking status: Never Smoker   • Smokeless tobacco: Never Used   Substance and Sexual Activity   • Alcohol use: No   •  "Drug use: No   • Sexual activity: Defer   Social History Narrative    Lives in [place].     Review of Systems   Musculoskeletal: Positive for arthralgias.   Neurological: Positive for dizziness and weakness (Balance).   All other systems reviewed and are negative.      Objective:    /74   Pulse 78   Ht 188 cm (74\")   Wt 97.1 kg (214 lb)   SpO2 95%   BMI 27.48 kg/m²     Neurology Exam:    General apperance: NAD.  Orthostatics done today.  His blood pressure on laying down was 198/104 and blood pressure on standing up was 192/100    Mental status: Alert, awake and oriented to time place and person.    Recent and Remote memory: Intact.    Attention span and Concentration: Normal.     Language and Speech: Intact- No dysarthria.    Fluency, Naming , Repitition and Comprehension:  Intact    Cranial Nerves:   CN II: Visual fields are full. Intact. Fundi - Normal, No papillederma, Pupils - ANTHONY  CN III, IV and VI: Extraocular movements are intact. Normal saccades.   CN V: Facial sensation is intact.   CN VII: Muscles of facial expression reveal no asymmetry. Intact.   CN VIII: Hearing is intact. Whispered voice intact.   CN IX and X: Palate elevates symmetrically. Intact  CN XI: Shoulder shrug is intact.   CN XII: Tongue is midline without evidence of atrophy or fasciculation.     Ophthalmoscopic exam of optic disc-normal    Motor:  Right UE muscle strength 5/5. Normal tone.     Left UE muscle strength 5/5. Normal tone.      Right LE muscle strength5/5. Normal tone.     Left LE muscle strength 5/5. Normal tone.      Sensory: Normal light touch, vibration and pinprick sensation bilaterally.    DTRs: 2+ bilaterally in upper and lower extremities.    Babinski: Negative bilaterally.    Co-ordination: Normal finger-to-nose, heel to shin B/L.    Rhomberg: Negative.    Gait: Had mild problems getting up due to right knee pain.  Intact gait    Cardiovascular: Regular rate and rhythm without murmur, gallop or " rub.    Assessment and Plan:  1. Balance problem   I feel patient's symptoms are are most likely due to autonomic dysfunction which could be as a result of his diabetes  Even though orthostatics done today did not show a significant drop in blood pressure his blood pressure on laying down was extremely high  I have asked him to continue keeping himself well hydrated.  He should also start wearing above-knee moderate compression stockings for increased venous return  He will monitor his blood pressure regularly and call his PCP to adjust his medications if need be.  Even today he should recheck his blood pressure after going home  I will try to obtain his CT head however I do not think his chronic infarct would be responsible for the dizziness as it has continued for over 9 months    Return in about 6 weeks (around 8/20/2020).         Amparo Gabriel MD

## 2021-07-21 ENCOUNTER — TRANSCRIBE ORDERS (OUTPATIENT)
Dept: ADMINISTRATIVE | Facility: HOSPITAL | Age: 76
End: 2021-07-21

## 2021-07-21 DIAGNOSIS — I70.213 ATHEROSCLEROSIS OF NATIVE ARTERY OF BOTH LOWER EXTREMITIES WITH INTERMITTENT CLAUDICATION (HCC): Primary | ICD-10-CM

## 2021-07-29 ENCOUNTER — HOSPITAL ENCOUNTER (OUTPATIENT)
Facility: HOSPITAL | Age: 76
Discharge: HOME | End: 2021-07-29
Attending: INTERNAL MEDICINE | Admitting: INTERNAL MEDICINE

## 2021-07-29 VITALS
SYSTOLIC BLOOD PRESSURE: 137 MMHG | HEIGHT: 74 IN | WEIGHT: 221 LBS | OXYGEN SATURATION: 91 % | DIASTOLIC BLOOD PRESSURE: 81 MMHG | HEART RATE: 68 BPM | RESPIRATION RATE: 18 BRPM | BODY MASS INDEX: 28.36 KG/M2 | TEMPERATURE: 97.6 F

## 2021-07-29 DIAGNOSIS — I70.213 ATHEROSCLEROSIS OF NATIVE ARTERY OF BOTH LOWER EXTREMITIES WITH INTERMITTENT CLAUDICATION (HCC): ICD-10-CM

## 2021-07-29 LAB
ANION GAP SERPL CALCULATED.3IONS-SCNC: 12 MMOL/L (ref 5–15)
BUN SERPL-MCNC: 21 MG/DL (ref 8–23)
BUN/CREAT SERPL: 18.6 (ref 7–25)
CALCIUM SPEC-SCNC: 8.6 MG/DL (ref 8.6–10.5)
CHLORIDE SERPL-SCNC: 106 MMOL/L (ref 98–107)
CO2 SERPL-SCNC: 25 MMOL/L (ref 22–29)
CREAT BLDA-MCNC: 1.2 MG/DL (ref 0.6–1.3)
CREAT SERPL-MCNC: 1.13 MG/DL (ref 0.76–1.27)
DEPRECATED RDW RBC AUTO: 40.9 FL (ref 37–54)
ERYTHROCYTE [DISTWIDTH] IN BLOOD BY AUTOMATED COUNT: 12.7 % (ref 12.3–15.4)
GFR SERPL CREATININE-BSD FRML MDRD: 63 ML/MIN/1.73
GLUCOSE SERPL-MCNC: 241 MG/DL (ref 65–99)
HCT VFR BLD AUTO: 45.1 % (ref 37.5–51)
HGB BLD-MCNC: 15.1 G/DL (ref 13–17.7)
MCH RBC QN AUTO: 29.3 PG (ref 26.6–33)
MCHC RBC AUTO-ENTMCNC: 33.5 G/DL (ref 31.5–35.7)
MCV RBC AUTO: 87.6 FL (ref 79–97)
PLATELET # BLD AUTO: 192 10*3/MM3 (ref 140–450)
PMV BLD AUTO: 11.7 FL (ref 6–12)
POTASSIUM SERPL-SCNC: 4.6 MMOL/L (ref 3.5–5.2)
RBC # BLD AUTO: 5.15 10*6/MM3 (ref 4.14–5.8)
SODIUM SERPL-SCNC: 143 MMOL/L (ref 136–145)
WBC # BLD AUTO: 6.97 10*3/MM3 (ref 3.4–10.8)

## 2021-07-29 PROCEDURE — 85027 COMPLETE CBC AUTOMATED: CPT | Performed by: INTERNAL MEDICINE

## 2021-07-29 PROCEDURE — C1725 CATH, TRANSLUMIN NON-LASER: HCPCS | Performed by: INTERNAL MEDICINE

## 2021-07-29 PROCEDURE — 80048 BASIC METABOLIC PNL TOTAL CA: CPT | Performed by: INTERNAL MEDICINE

## 2021-07-29 PROCEDURE — C1894 INTRO/SHEATH, NON-LASER: HCPCS | Performed by: INTERNAL MEDICINE

## 2021-07-29 PROCEDURE — C1769 GUIDE WIRE: HCPCS | Performed by: INTERNAL MEDICINE

## 2021-07-29 PROCEDURE — 25010000002 BIVALIRUDIN TRIFLUOROACETATE 250 MG RECONSTITUTED SOLUTION 1 EACH VIAL: Performed by: INTERNAL MEDICINE

## 2021-07-29 PROCEDURE — C2623 CATH, TRANSLUMIN, DRUG-COAT: HCPCS | Performed by: INTERNAL MEDICINE

## 2021-07-29 PROCEDURE — 0 IOPAMIDOL PER 1 ML: Performed by: INTERNAL MEDICINE

## 2021-07-29 PROCEDURE — C1887 CATHETER, GUIDING: HCPCS | Performed by: INTERNAL MEDICINE

## 2021-07-29 PROCEDURE — 25010000002 FENTANYL CITRATE (PF) 50 MCG/ML SOLUTION: Performed by: INTERNAL MEDICINE

## 2021-07-29 PROCEDURE — 75625 CONTRAST EXAM ABDOMINL AORTA: CPT | Performed by: INTERNAL MEDICINE

## 2021-07-29 PROCEDURE — 82565 ASSAY OF CREATININE: CPT

## 2021-07-29 PROCEDURE — 75716 ARTERY X-RAYS ARMS/LEGS: CPT | Performed by: INTERNAL MEDICINE

## 2021-07-29 PROCEDURE — 25010000002 MIDAZOLAM PER 1 MG: Performed by: INTERNAL MEDICINE

## 2021-07-29 RX ORDER — TEMAZEPAM 7.5 MG/1
7.5 CAPSULE ORAL NIGHTLY PRN
Status: DISCONTINUED | OUTPATIENT
Start: 2021-07-29 | End: 2021-07-30 | Stop reason: HOSPADM

## 2021-07-29 RX ORDER — ACETAMINOPHEN 325 MG/1
650 TABLET ORAL EVERY 4 HOURS PRN
Status: DISCONTINUED | OUTPATIENT
Start: 2021-07-29 | End: 2021-07-30 | Stop reason: HOSPADM

## 2021-07-29 RX ORDER — CLOPIDOGREL BISULFATE 75 MG/1
75 TABLET ORAL DAILY
Qty: 30 TABLET | Refills: 11 | Status: ON HOLD | OUTPATIENT
Start: 2021-07-29 | End: 2022-05-18

## 2021-07-29 RX ORDER — MIDAZOLAM HYDROCHLORIDE 1 MG/ML
INJECTION INTRAMUSCULAR; INTRAVENOUS AS NEEDED
Status: DISCONTINUED | OUTPATIENT
Start: 2021-07-29 | End: 2021-07-29 | Stop reason: HOSPADM

## 2021-07-29 RX ORDER — NICOTINE POLACRILEX 4 MG
15 LOZENGE BUCCAL
Status: DISCONTINUED | OUTPATIENT
Start: 2021-07-29 | End: 2021-07-30 | Stop reason: HOSPADM

## 2021-07-29 RX ORDER — CLOPIDOGREL BISULFATE 75 MG/1
TABLET ORAL AS NEEDED
Status: DISCONTINUED | OUTPATIENT
Start: 2021-07-29 | End: 2021-07-29 | Stop reason: HOSPADM

## 2021-07-29 RX ORDER — ASPIRIN 325 MG
325 TABLET, DELAYED RELEASE (ENTERIC COATED) ORAL DAILY
Status: DISCONTINUED | OUTPATIENT
Start: 2021-07-29 | End: 2021-07-30 | Stop reason: HOSPADM

## 2021-07-29 RX ORDER — OXYCODONE AND ACETAMINOPHEN 10; 325 MG/1; MG/1
1 TABLET ORAL EVERY 4 HOURS PRN
Status: DISCONTINUED | OUTPATIENT
Start: 2021-07-29 | End: 2021-07-30 | Stop reason: HOSPADM

## 2021-07-29 RX ORDER — HYDROCODONE BITARTRATE AND ACETAMINOPHEN 5; 325 MG/1; MG/1
1 TABLET ORAL EVERY 4 HOURS PRN
Status: DISCONTINUED | OUTPATIENT
Start: 2021-07-29 | End: 2021-07-30 | Stop reason: HOSPADM

## 2021-07-29 RX ORDER — SODIUM CHLORIDE 9 MG/ML
250 INJECTION, SOLUTION INTRAVENOUS ONCE AS NEEDED
Status: DISCONTINUED | OUTPATIENT
Start: 2021-07-29 | End: 2021-07-30 | Stop reason: HOSPADM

## 2021-07-29 RX ORDER — DEXTROSE MONOHYDRATE 25 G/50ML
25 INJECTION, SOLUTION INTRAVENOUS
Status: DISCONTINUED | OUTPATIENT
Start: 2021-07-29 | End: 2021-07-30 | Stop reason: HOSPADM

## 2021-07-29 RX ORDER — FENTANYL CITRATE 50 UG/ML
INJECTION, SOLUTION INTRAMUSCULAR; INTRAVENOUS AS NEEDED
Status: DISCONTINUED | OUTPATIENT
Start: 2021-07-29 | End: 2021-07-29 | Stop reason: HOSPADM

## 2021-07-29 RX ORDER — LIDOCAINE HYDROCHLORIDE 10 MG/ML
INJECTION, SOLUTION EPIDURAL; INFILTRATION; INTRACAUDAL; PERINEURAL AS NEEDED
Status: DISCONTINUED | OUTPATIENT
Start: 2021-07-29 | End: 2021-07-29 | Stop reason: HOSPADM

## 2021-07-29 RX ORDER — ROSUVASTATIN CALCIUM 20 MG/1
20 TABLET, COATED ORAL DAILY
Status: ON HOLD | COMMUNITY
End: 2022-05-18

## 2021-07-29 RX ORDER — SODIUM CHLORIDE 9 MG/ML
250 INJECTION, SOLUTION INTRAVENOUS CONTINUOUS
Status: ACTIVE | OUTPATIENT
Start: 2021-07-29 | End: 2021-07-29

## 2021-07-29 RX ORDER — ALPRAZOLAM 0.25 MG/1
0.25 TABLET ORAL 3 TIMES DAILY PRN
Status: DISCONTINUED | OUTPATIENT
Start: 2021-07-29 | End: 2021-07-30 | Stop reason: HOSPADM

## 2021-07-29 RX ADMIN — ASPIRIN 325 MG: 325 TABLET, COATED ORAL at 13:18

## 2021-10-20 ENCOUNTER — APPOINTMENT (OUTPATIENT)
Dept: CT IMAGING | Facility: HOSPITAL | Age: 76
End: 2021-10-20

## 2021-10-20 ENCOUNTER — APPOINTMENT (OUTPATIENT)
Dept: MRI IMAGING | Facility: HOSPITAL | Age: 76
End: 2021-10-20

## 2021-10-20 ENCOUNTER — APPOINTMENT (OUTPATIENT)
Dept: GENERAL RADIOLOGY | Facility: HOSPITAL | Age: 76
End: 2021-10-20

## 2021-10-20 ENCOUNTER — HOSPITAL ENCOUNTER (EMERGENCY)
Facility: HOSPITAL | Age: 76
Discharge: HOME | End: 2021-10-20
Attending: EMERGENCY MEDICINE | Admitting: EMERGENCY MEDICINE

## 2021-10-20 VITALS
BODY MASS INDEX: 26.69 KG/M2 | OXYGEN SATURATION: 97 % | HEIGHT: 74 IN | DIASTOLIC BLOOD PRESSURE: 80 MMHG | SYSTOLIC BLOOD PRESSURE: 126 MMHG | RESPIRATION RATE: 18 BRPM | WEIGHT: 208 LBS | HEART RATE: 55 BPM | TEMPERATURE: 98.2 F

## 2021-10-20 DIAGNOSIS — R07.9 CHEST PAIN, UNSPECIFIED TYPE: Primary | ICD-10-CM

## 2021-10-20 DIAGNOSIS — I10 HYPERTENSION, UNSPECIFIED TYPE: ICD-10-CM

## 2021-10-20 DIAGNOSIS — R42 DIZZINESS: ICD-10-CM

## 2021-10-20 LAB
ALBUMIN SERPL-MCNC: 4.2 G/DL (ref 3.5–5.2)
ALBUMIN/GLOB SERPL: 1.4 G/DL
ALP SERPL-CCNC: 70 U/L (ref 39–117)
ALT SERPL W P-5'-P-CCNC: 16 U/L (ref 1–41)
ANION GAP SERPL CALCULATED.3IONS-SCNC: 10 MMOL/L (ref 5–15)
AST SERPL-CCNC: 14 U/L (ref 1–40)
BASOPHILS # BLD AUTO: 0.05 10*3/MM3 (ref 0–0.2)
BASOPHILS NFR BLD AUTO: 0.6 % (ref 0–1.5)
BILIRUB SERPL-MCNC: 0.3 MG/DL (ref 0–1.2)
BUN SERPL-MCNC: 23 MG/DL (ref 8–23)
BUN/CREAT SERPL: 18.9 (ref 7–25)
CALCIUM SPEC-SCNC: 9.2 MG/DL (ref 8.6–10.5)
CHLORIDE SERPL-SCNC: 103 MMOL/L (ref 98–107)
CO2 SERPL-SCNC: 26 MMOL/L (ref 22–29)
CREAT SERPL-MCNC: 1.22 MG/DL (ref 0.76–1.27)
DEPRECATED RDW RBC AUTO: 40.9 FL (ref 37–54)
EOSINOPHIL # BLD AUTO: 0.42 10*3/MM3 (ref 0–0.4)
EOSINOPHIL NFR BLD AUTO: 4.8 % (ref 0.3–6.2)
ERYTHROCYTE [DISTWIDTH] IN BLOOD BY AUTOMATED COUNT: 12.7 % (ref 12.3–15.4)
GFR SERPL CREATININE-BSD FRML MDRD: 58 ML/MIN/1.73
GLOBULIN UR ELPH-MCNC: 2.9 GM/DL
GLUCOSE SERPL-MCNC: 189 MG/DL (ref 65–99)
HCT VFR BLD AUTO: 46.6 % (ref 37.5–51)
HGB BLD-MCNC: 15.4 G/DL (ref 13–17.7)
HOLD SPECIMEN: NORMAL
IMM GRANULOCYTES # BLD AUTO: 0.03 10*3/MM3 (ref 0–0.05)
IMM GRANULOCYTES NFR BLD AUTO: 0.3 % (ref 0–0.5)
LIPASE SERPL-CCNC: 23 U/L (ref 13–60)
LYMPHOCYTES # BLD AUTO: 1.95 10*3/MM3 (ref 0.7–3.1)
LYMPHOCYTES NFR BLD AUTO: 22.3 % (ref 19.6–45.3)
MCH RBC QN AUTO: 29.1 PG (ref 26.6–33)
MCHC RBC AUTO-ENTMCNC: 33 G/DL (ref 31.5–35.7)
MCV RBC AUTO: 87.9 FL (ref 79–97)
MONOCYTES # BLD AUTO: 0.59 10*3/MM3 (ref 0.1–0.9)
MONOCYTES NFR BLD AUTO: 6.7 % (ref 5–12)
NEUTROPHILS NFR BLD AUTO: 5.71 10*3/MM3 (ref 1.7–7)
NEUTROPHILS NFR BLD AUTO: 65.3 % (ref 42.7–76)
NRBC BLD AUTO-RTO: 0 /100 WBC (ref 0–0.2)
NT-PROBNP SERPL-MCNC: 417.1 PG/ML (ref 0–1800)
PLATELET # BLD AUTO: 210 10*3/MM3 (ref 140–450)
PMV BLD AUTO: 11.5 FL (ref 6–12)
POTASSIUM SERPL-SCNC: 5.1 MMOL/L (ref 3.5–5.2)
PROT SERPL-MCNC: 7.1 G/DL (ref 6–8.5)
QT INTERVAL: 446 MS
QTC INTERVAL: 460 MS
RBC # BLD AUTO: 5.3 10*6/MM3 (ref 4.14–5.8)
SODIUM SERPL-SCNC: 139 MMOL/L (ref 136–145)
TROPONIN T SERPL-MCNC: 0.01 NG/ML (ref 0–0.03)
TROPONIN T SERPL-MCNC: <0.01 NG/ML (ref 0–0.03)
WBC # BLD AUTO: 8.75 10*3/MM3 (ref 3.4–10.8)
WHOLE BLOOD HOLD SPECIMEN: NORMAL
WHOLE BLOOD HOLD SPECIMEN: NORMAL

## 2021-10-20 PROCEDURE — 70450 CT HEAD/BRAIN W/O DYE: CPT

## 2021-10-20 PROCEDURE — 83690 ASSAY OF LIPASE: CPT | Performed by: EMERGENCY MEDICINE

## 2021-10-20 PROCEDURE — 99284 EMERGENCY DEPT VISIT MOD MDM: CPT

## 2021-10-20 PROCEDURE — 93005 ELECTROCARDIOGRAM TRACING: CPT | Performed by: EMERGENCY MEDICINE

## 2021-10-20 PROCEDURE — 71045 X-RAY EXAM CHEST 1 VIEW: CPT

## 2021-10-20 PROCEDURE — 93005 ELECTROCARDIOGRAM TRACING: CPT

## 2021-10-20 PROCEDURE — 84484 ASSAY OF TROPONIN QUANT: CPT | Performed by: EMERGENCY MEDICINE

## 2021-10-20 PROCEDURE — 83880 ASSAY OF NATRIURETIC PEPTIDE: CPT | Performed by: EMERGENCY MEDICINE

## 2021-10-20 PROCEDURE — 80053 COMPREHEN METABOLIC PANEL: CPT | Performed by: EMERGENCY MEDICINE

## 2021-10-20 PROCEDURE — 70551 MRI BRAIN STEM W/O DYE: CPT

## 2021-10-20 PROCEDURE — 85025 COMPLETE CBC W/AUTO DIFF WBC: CPT

## 2021-10-20 RX ORDER — METOPROLOL SUCCINATE 50 MG/1
50 TABLET, EXTENDED RELEASE ORAL DAILY
Qty: 20 TABLET | Refills: 0 | Status: ON HOLD | OUTPATIENT
Start: 2021-10-20 | End: 2022-10-02

## 2021-10-20 RX ORDER — SODIUM CHLORIDE 0.9 % (FLUSH) 0.9 %
10 SYRINGE (ML) INJECTION AS NEEDED
Status: DISCONTINUED | OUTPATIENT
Start: 2021-10-20 | End: 2021-10-20 | Stop reason: HOSPADM

## 2021-10-20 RX ORDER — METOPROLOL SUCCINATE 50 MG/1
50 TABLET, EXTENDED RELEASE ORAL ONCE
Status: COMPLETED | OUTPATIENT
Start: 2021-10-20 | End: 2021-10-20

## 2021-10-20 RX ORDER — PROCHLORPERAZINE 25 MG/1
SUPPOSITORY RECTAL
Qty: 1 EACH | Refills: 0 | Status: ON HOLD | OUTPATIENT
Start: 2021-10-20 | End: 2022-10-02

## 2021-10-20 RX ORDER — ASPIRIN 81 MG/1
324 TABLET, CHEWABLE ORAL ONCE
Status: COMPLETED | OUTPATIENT
Start: 2021-10-20 | End: 2021-10-20

## 2021-10-20 RX ORDER — METOPROLOL SUCCINATE 50 MG/1
50 TABLET, EXTENDED RELEASE ORAL DAILY
Qty: 20 TABLET | Refills: 0 | Status: SHIPPED | OUTPATIENT
Start: 2021-10-20 | End: 2021-10-20 | Stop reason: SDUPTHER

## 2021-10-20 RX ADMIN — METOPROLOL SUCCINATE 50 MG: 50 TABLET, EXTENDED RELEASE ORAL at 17:36

## 2021-10-20 RX ADMIN — ASPIRIN 81 MG CHEWABLE TABLET 243 MG: 81 TABLET CHEWABLE at 17:35

## 2021-10-20 NOTE — ED PROVIDER NOTES
[place]    EMERGENCY DEPARTMENT ENCOUNTER      Pt Name: Jermaine Singh  MRN: [id number]  YOB: 1945  Date of evaluation: 10/20/2021  Provider: Timothy Khanna DO    CHIEF COMPLAINT       Chief Complaint   Patient presents with   • Chest Pain         HISTORY OF PRESENT ILLNESS  (Location/Symptom, Timing/Onset, Context/Setting, Quality, Duration, Modifying Factors, Severity.)   Jermaine Singh is a 76 y.o. male who presents to the emergency department for evaluation of left chest pain, radiation to the left shoulder, right mild right-sided headache when he awoke about 830 this morning.  He denies any fall, injury or trauma, no unilateral weakness, numbness or tingling.  Does have a history of hypertension, coronary artery disease, status post CABG in 2018.  He follows with Dr. Hernandez for cardiology.  He notes that compliance with his medications, blood sugars have been stable, he notes in July he had a stent placement in the right lower extremity, is not had any other significant issues since his procedure.  Patient denies any fever, chills, recent illness.  He has no other acute systemic complaints.  Denies any chest pain with exertion is been present even at rest.      Nursing notes were reviewed.    REVIEW OF SYSTEMS    (2-9 systems for level 4, 10 or more for level 5)   ROS:  General:  No fevers, no chills, no weakness  Cardiovascular:  + chest pain, no palpitations  Respiratory:  No shortness of breath, no cough, no wheezing  Gastrointestinal:  No pain, no nausea, no vomiting, no diarrhea  Musculoskeletal:  No muscle pain, no joint pain  Skin:  No rash  Neurologic:  No speech problems, + headache, no extremity numbness, no extremity tingling, no extremity weakness  Psychiatric:  No anxiety  Genitourinary:  No dysuria, no hematuria    Except as noted above the remainder of the review of systems was reviewed and negative.       PAST MEDICAL HISTORY     Past Medical History:   Diagnosis Date   •  Asthma    • Coronary artery disease    • Diabetes mellitus (CMS/[facility]) 2000    started on inuslin 12/2018; started on po meds in 2000; checking blood sugars daily    • Disease of thyroid gland     po meds daily for hypothyroidism    • History of fracture as a child     rt leg- severe    • Hyperlipidemia    • Hypertension    • Hypothyroidism    • Peripheral neuropathy    • Peripheral vascular disease (CMS/[facility])     s/p angiogram 2/19-needs stent in left leg    • RBBB    • Right knee pain    • Vitamin D deficiency          SURGICAL HISTORY       Past Surgical History:   Procedure Laterality Date   • APPENDECTOMY     • CARDIAC CATHETERIZATION N/A 2/14/2019    Procedure: Left Heart Cath;  Surgeon: Cooper Apodaca MD;  Location:  [facility];  Service: Cardiology   • COLONOSCOPY     • CORONARY ARTERY BYPASS GRAFT N/A 3/22/2019    Procedure: MEDIAN STERNOTOMY, CORONARY ARTERY BYPASS GRAFT X3, UTILIZING THE LEFT INTERNAL MAMMARY ARTERY, EVH AND OPEN HARVEST OF THE RIGHT GREATER SAPHENOUS VEIN, EXPLORATION OF THE LEFT LEG;  Surgeon: Ap Au MD;  Location:  [facility] OR;  Service: Cardiothoracic   • EYE SURGERY Bilateral     cataracts    • INTERVENTIONAL RADIOLOGY PROCEDURE N/A 7/29/2021    Procedure: Abdominal Aortagram with Runoff;  Surgeon: Jermaine Hernandez MD;  Location:  [facility];  Service: Cardiovascular;  Laterality: N/A;   • KNEE ARTHROSCOPY      right x 2, left x 1   • LACERATION REPAIR      right leg   • LEG SURGERY      2 for fracture of rt leg    • TONSILLECTOMY      Adnoidectomy         CURRENT MEDICATIONS       Current Facility-Administered Medications:   •  sodium chloride 0.9 % flush 10 mL, 10 mL, Intravenous, PRN, Timothy Khanna, DO    Current Outpatient Medications:   •  amLODIPine (NORVASC) 5 MG tablet, Take 5 mg by mouth Every Morning., Disp: , Rfl:   •  aspirin 81 MG chewable tablet, Chew 81 mg Daily., Disp: , Rfl:   •  Blood Glucose  Monitoring Suppl (ONE TOUCH ULTRA 2) w/Device kit, USE TO CHECK GLUCOSE ONCE DAILY AS DIRECTED, Disp: , Rfl:   •  cetirizine (zyrTEC) 10 MG tablet, Take 10 mg by mouth Daily., Disp: , Rfl:   •  Cholecalciferol (VITAMIN D3) 5000 units tablet tablet, Take 5,000 Units by mouth Daily., Disp: , Rfl:   •  clopidogrel (PLAVIX) 75 MG tablet, Take 1 tablet by mouth Daily., Disp: 30 tablet, Rfl: 11  •  Continuous Blood Gluc  (DEXCOM G6 ) device, See Admin Instructions., Disp: , Rfl:   •  Continuous Blood Gluc Sensor (DEXCOM G6 SENSOR), APPLY 1 SENSOR AND CHANGE EVERY 10 DAYS, Disp: , Rfl:   •  Continuous Blood Gluc Sensor (Dexcom G6 Sensor), Every 10 (Ten) Days., Disp: 1 each, Rfl: 0  •  Continuous Blood Gluc Transmit (DEXCOM G6 TRANSMITTER) misc, ONE TRANSMITTER FOR EVERY 3 MONTHS, Disp: , Rfl:   •  HYDROcodone-acetaminophen (NORCO) 5-325 MG per tablet, Take 1 tablet by mouth Every 6 (Six) Hours As Needed for Moderate Pain ., Disp: 20 tablet, Rfl: 0  •  insulin detemir (LEVEMIR FLEXTOUCH) 100 UNIT/ML injection, Inject 44 Units under the skin into the appropriate area as directed Every Night. (Patient taking differently: Inject 34 Units under the skin into the appropriate area as directed Every Night.), Disp: , Rfl:   •  insulin lispro (humaLOG) 100 UNIT/ML injection, Inject 5 Units under the skin into the appropriate area as directed 3 (Three) Times a Day Before Meals., Disp: 1 each, Rfl: 1  •  levothyroxine (SYNTHROID, LEVOTHROID) 50 MCG tablet, Take 50 mcg by mouth Every Morning., Disp: , Rfl:   •  metoprolol succinate XL (TOPROL-XL) 50 MG 24 hr tablet, Take 1 tablet by mouth Daily., Disp: 20 tablet, Rfl: 0  •  MM PEN NEEDLES 32G X 4 MM misc, USE 4 PER DAY, Disp: , Rfl:   •  rosuvastatin (CRESTOR) 20 MG tablet, Take 20 mg by mouth Daily., Disp: , Rfl:   •  sodium chloride (OCEAN) 0.65 % nasal spray, 1 spray into the nostril(s) as directed by provider As Needed for Congestion., Disp: , Rfl:      Facility-Administered Medications Ordered in Other Encounters:   •  Chlorhexidine Gluconate Cloth 2 % pads 1 application, 1 application, Topical, Q12H PRN, Mohan Arauz PA    ALLERGIES     Patient has no known allergies.    FAMILY HISTORY       Family History   Problem Relation Age of Onset   • Coronary artery disease Mother    • Diabetes Mother    • Cancer Father           SOCIAL HISTORY       Social History     Socioeconomic History   • Marital status:    • Number of children: 2   Tobacco Use   • Smoking status: Never Smoker   • Smokeless tobacco: Never Used   Substance and Sexual Activity   • Alcohol use: No   • Drug use: No   • Sexual activity: Defer         PHYSICAL EXAM    (up to 7 for level 4, 8 or more for level 5)     Vitals:    10/20/21 1820 10/20/21 1835 10/20/21 1850 10/20/21 1905   BP:       BP Location:       Patient Position:       Pulse: 56 55 56 55   Resp:       Temp:       TempSrc:       SpO2: 97% 97% 98% 97%   Weight:       Height:           Physical Exam  General : Patient is awake, alert, oriented, in no acute distress, nontoxic appearing, GCS 15  HEENT: Pupils are equally round and reactive to light, EOMI, conjunctivae clear, sclerae white, there is no injection no icterus.  Oral mucosa is moist, no exudate. Uvula is midline  Neck: Neck is supple, full range of motion, trachea midline  Cardiac: Heart regular rate, rhythm, no murmurs, rubs, or gallops  Lungs: Lungs are clear to auscultation, there is no wheezing, rhonchi, or rales. There is no use of accessory muscles  Chest wall: There is no tenderness to palpation over the chest wall or over ribs  Abdomen: Abdomen is soft, nontender, nondistended. There are no firm or pulsatile masses, no rebound rigidity or guarding.   Musculoskeletal: No peripheral edema.  Distal pulses are intact.  Extremities are warm.  5 out of 5 strength in all 4 extremities.  No focal muscle deficits are appreciated  Neuro: Motor intact, sensory  intact, level of consciousness is normal, cerebellar function is normal, no focal neurological deficit examination.  Dermatology: Skin is warm and dry  Psych: Mentation is grossly normal, cognition is grossly normal. Affect is appropriate.      DIAGNOSTIC RESULTS     EKG: All EKGs are interpreted by the Emergency Department Physician who either signs or Co-signs this chart in the absence of a cardiologist.    ECG 12 Lead         ECG 12 Lead   Final Result   Test Reason : chest pain   Blood Pressure :   */*   mmHG   Vent. Rate :  64 BPM     Atrial Rate :  64 BPM      P-R Int : 376 ms          QRS Dur : 132 ms       QT Int : 446 ms       P-R-T Axes :   * -23  12 degrees      QTc Int : 460 ms      Sinus rhythm with 1st degree AV block   Right bundle branch block   Inferior infarct (cited on or before 14-FEB-2019)   Abnormal ECG   When compared with ECG of 24-MAR-2019 05:19,   ST no longer elevated in Anterior leads   Confirmed by CAROLYNN SAENZ MD ([id number]) on 10/20/2021 3:28:56 PM      Referred By:            Confirmed By: CAROLYNN SAENZ MD          RADIOLOGY:   Non-plain film images such as CT, Ultrasound and MRI are read by the radiologist. Plain radiographic images are visualized and preliminarily interpreted by the emergency physician with the below findings:      [] Radiologist's Report Reviewed:  MRI Brain Without Contrast   Final Result   Atrophy and old small vessel ischemic changes. No acute findings      Signer Name: Ricardo Singletary MD    Signed: 10/20/2021 8:47 PM    Workstation Name: [facility]     [facility]      CT Head Without Contrast   Preliminary Result   No acute intracranial abnormality.              XR Chest 1 View   Preliminary Result   No acute cardiopulmonary disease.       D:  10/20/2021   E:  10/20/2021                        ED BEDSIDE ULTRASOUND:   Performed by ED Physician - none    LABS:    I have reviewed and interpreted all of the currently available lab results from  this visit (if applicable):  Results for orders placed or performed during the hospital encounter of 10/20/21   Troponin    Specimen: Blood   Result Value Ref Range    Troponin T <0.010 0.000 - 0.030 ng/mL   Troponin    Specimen: Blood   Result Value Ref Range    Troponin T 0.013 0.000 - 0.030 ng/mL   Comprehensive Metabolic Panel    Specimen: Blood   Result Value Ref Range    Glucose 189 (H) 65 - 99 mg/dL    BUN 23 8 - 23 mg/dL    Creatinine 1.22 0.76 - 1.27 mg/dL    Sodium 139 136 - 145 mmol/L    Potassium 5.1 3.5 - 5.2 mmol/L    Chloride 103 98 - 107 mmol/L    CO2 26.0 22.0 - 29.0 mmol/L    Calcium 9.2 8.6 - 10.5 mg/dL    Total Protein 7.1 6.0 - 8.5 g/dL    Albumin 4.20 3.50 - 5.20 g/dL    ALT (SGPT) 16 1 - 41 U/L    AST (SGOT) 14 1 - 40 U/L    Alkaline Phosphatase 70 39 - 117 U/L    Total Bilirubin 0.3 0.0 - 1.2 mg/dL    eGFR Non African Amer 58 (L) >60 mL/min/1.73    Globulin 2.9 gm/dL    A/G Ratio 1.4 g/dL    BUN/Creatinine Ratio 18.9 7.0 - 25.0    Anion Gap 10.0 5.0 - 15.0 mmol/L   Lipase    Specimen: Blood   Result Value Ref Range    Lipase 23 13 - 60 U/L   BNP    Specimen: Blood   Result Value Ref Range    proBNP 417.1 0.0-1,800.0 pg/mL   CBC Auto Differential    Specimen: Blood   Result Value Ref Range    WBC 8.75 3.40 - 10.80 10*3/mm3    RBC 5.30 4.14 - 5.80 10*6/mm3    Hemoglobin 15.4 13.0 - 17.7 g/dL    Hematocrit 46.6 37.5 - 51.0 %    MCV 87.9 79.0 - 97.0 fL    MCH 29.1 26.6 - 33.0 pg    MCHC 33.0 31.5 - 35.7 g/dL    RDW 12.7 12.3 - 15.4 %    RDW-SD 40.9 37.0 - 54.0 fl    MPV 11.5 6.0 - 12.0 fL    Platelets 210 140 - 450 10*3/mm3    Neutrophil % 65.3 42.7 - 76.0 %    Lymphocyte % 22.3 19.6 - 45.3 %    Monocyte % 6.7 5.0 - 12.0 %    Eosinophil % 4.8 0.3 - 6.2 %    Basophil % 0.6 0.0 - 1.5 %    Immature Grans % 0.3 0.0 - 0.5 %    Neutrophils, Absolute 5.71 1.70 - 7.00 10*3/mm3    Lymphocytes, Absolute 1.95 0.70 - 3.10 10*3/mm3    Monocytes, Absolute 0.59 0.10 - 0.90 10*3/mm3    Eosinophils, Absolute 0.42  (H) 0.00 - 0.40 10*3/mm3    Basophils, Absolute 0.05 0.00 - 0.20 10*3/mm3    Immature Grans, Absolute 0.03 0.00 - 0.05 10*3/mm3    nRBC 0.0 0.0 - 0.2 /100 WBC   ECG 12 Lead   Result Value Ref Range    QT Interval 446 ms    QTC Interval 460 ms   Green Top (Gel)   Result Value Ref Range    Extra Tube Hold for add-ons.    Lavender Top   Result Value Ref Range    Extra Tube hold for add-on    Gold Top - SST   Result Value Ref Range    Extra Tube Hold for add-ons.    Gray Top   Result Value Ref Range    Extra Tube Hold for add-ons.    Light Blue Top   Result Value Ref Range    Extra Tube hold for add-on         All other labs were within normal range or not returned as of this dictation.      EMERGENCY DEPARTMENT COURSE and DIFFERENTIAL DIAGNOSIS/MDM:   Vitals:    Vitals:    10/20/21 1820 10/20/21 1835 10/20/21 1850 10/20/21 1905   BP:       BP Location:       Patient Position:       Pulse: 56 55 56 55   Resp:       Temp:       TempSrc:       SpO2: 97% 97% 98% 97%   Weight:       Height:           ED Course as of 10/20/21 2130   Wed Oct 20, 2021   1829 Recheck blood pressure at 6:30 PM: 145/94 [AP]      ED Course User Index  [AP] Timothy Khanna DO       Patient with history of coronary artery disease who presents with left-sided chest pain, mild right-sided headache, dizziness which started when he awoke this morning about 830.  On arrival he is awake and alert, no focal neurological deficit on examination.  Initial blood pressure on check in 212/113, during my evaluation his blood pressure is 178/108.  With his constellation of symptoms we did obtain imaging, IV, labs.  Will page the patient's cardiologist, Dr. Hernandez.    Case discussed with Dr. Hernandez, appreciate his input.  He recommends continuing cardiac work-up, likely some hypertension induced chest discomfort.  Patient is on amlodipine 5 mg, he recommends starting patient on metoprolol extended release 50 mg daily and he will follow up for recheck in  his clinic.    MRI is unremarkable for any acute abnormalities, there is some age-related changes noted but otherwise unremarkable.  Patient feeling much better after the initiation of the metoprolol medication.  We will write him a prescription for this, have him follow closely with his PCP as well as his a cardiology specialist for reevaluation.  Return precautions discussed with the patient.  We also gave him a new monitor for his blood glucose monitoring strip as it had to be removed secondary to the MRI.    I had a discussion with the patient/family regarding diagnosis, diagnostic results, treatment plan, and medications.  The patient/family indicated understanding of these instructions.  I spent adequate time at the bedside preceding discharge necessary to personally discuss the aftercare instructions, giving patient education, providing explanations of the results of our evaluations/findings, and my decision making to assure that the patient/family understand the plan of care.  Time was allotted to answer questions at that time and throughout the ED course.  Emphasis was placed on timely follow-up after discharge.  I also discussed the potential for the development of an acute emergent condition requiring further evaluation, admission, or even surgical intervention. I discussed that we found nothing during the visit today indicating the need for further workup, admission, or the presence of an unstable medical condition.  I encouraged the patient to return to the emergency department immediately for ANY concerns, worsening, new complaints, or if symptoms persist and unable to seek follow-up in a timely fashion.  The patient/family expressed understanding and agreement with this plan.  The patient will follow-up with their PCP in 1-2 days for reevaluation.       MEDICATIONS ADMINISTERED IN ED:  Medications   sodium chloride 0.9 % flush 10 mL (has no administration in time range)   aspirin chewable tablet 324  mg (243 mg Oral Given 10/20/21 1735)   metoprolol succinate XL (TOPROL-XL) 24 hr tablet 50 mg (50 mg Oral Given 10/20/21 1736)       PROCEDURES:  Procedures    CRITICAL CARE TIME    Total Critical Care time was 0 minutes, excluding separately reportable procedures.   There was a high probability of clinically significant/life threatening deterioration in the patient's condition which required my urgent intervention.      FINAL IMPRESSION      1. Chest pain, unspecified type    2. Hypertension, unspecified type    3. Dizziness          DISPOSITION/PLAN     ED Disposition     ED Disposition Condition Comment    Discharge Stable           PATIENT REFERRED TO:  Farooq Painter MD  [address] [facility] [address]  [phone]    In 2 days      Cooper Apodaca MD  [address] [address]  [phone]    Schedule an appointment as soon as possible for a visit   Your cardiologist    [facility] Emergency Department  [address] [facility] [place] [address] [phone]    If symptoms worsen      DISCHARGE MEDICATIONS:     Medication List      START taking these medications    metoprolol succinate XL 50 MG 24 hr tablet  Commonly known as: TOPROL-XL  Take 1 tablet by mouth Daily.        CHANGE how you take these medications    * Dexcom G6 Sensor  What changed: Another medication with the same name was added. Make sure you understand how and when to take each.     * Dexcom G6 Sensor  Every 10 (Ten) Days.  What changed: You were already taking a medication with the same name, and this prescription was added. Make sure you understand how and when to take each.     insulin detemir 100 UNIT/ML injection  Commonly known as: Levemir FlexTouch  Inject 44 Units under the skin into the appropriate area as directed Every Night.  What changed: how much to take         * This list has 2 medication(s) that are the same as other medications prescribed for you. Read the  directions carefully, and ask your doctor or other care provider to review them with you.            CONTINUE taking these medications    amLODIPine 5 MG tablet  Commonly known as: NORVASC     aspirin 81 MG chewable tablet     cetirizine 10 MG tablet  Commonly known as: zyrTEC     clopidogrel 75 MG tablet  Commonly known as: PLAVIX  Take 1 tablet by mouth Daily.     Dexcom G6  device     Dexcom G6 Transmitter misc     HYDROcodone-acetaminophen 5-325 MG per tablet  Commonly known as: NORCO  Take 1 tablet by mouth Every 6 (Six) Hours As Needed for Moderate Pain .     insulin lispro 100 UNIT/ML injection  Commonly known as: humaLOG  Inject 5 Units under the skin into the appropriate area as directed 3 (Three) Times a Day Before Meals.     levothyroxine 50 MCG tablet  Commonly known as: SYNTHROID, LEVOTHROID     MM Pen Needles 32G X 4 MM misc  Generic drug: Insulin Pen Needle     ONE TOUCH ULTRA 2 w/Device kit     rosuvastatin 20 MG tablet  Commonly known as: CRESTOR     sodium chloride 0.65 % nasal spray     vitamin D3 125 MCG (5000 UT) tablet tablet           Where to Get Your Medications      These medications were sent to [facility] [address] [place] [address] [phone]  - [phone] [address] [address]    Phone: [phone]   · metoprolol succinate XL 50 MG 24 hr tablet     You can get these medications from any pharmacy    Bring a paper prescription for each of these medications  · Dexcom G6 Sensor             Comment: Please note this report has been produced using speech recognition software.      Timothy Khanna DO  Attending Emergency Physician               Timothy Khanna DO  10/20/21 7831

## 2021-10-30 LAB
QT INTERVAL: 442 MS
QTC INTERVAL: 477 MS

## 2022-02-27 PROCEDURE — 87086 URINE CULTURE/COLONY COUNT: CPT | Performed by: NURSE PRACTITIONER

## 2022-04-29 ENCOUNTER — TRANSCRIBE ORDERS (OUTPATIENT)
Dept: ADMINISTRATIVE | Facility: HOSPITAL | Age: 77
End: 2022-04-29

## 2022-04-29 DIAGNOSIS — I49.5 SSS (SICK SINUS SYNDROME): Primary | ICD-10-CM

## 2022-05-18 ENCOUNTER — APPOINTMENT (OUTPATIENT)
Dept: GENERAL RADIOLOGY | Facility: HOSPITAL | Age: 77
End: 2022-05-18

## 2022-05-18 ENCOUNTER — HOSPITAL ENCOUNTER (OUTPATIENT)
Facility: HOSPITAL | Age: 77
Setting detail: HOSPITAL OUTPATIENT SURGERY
Discharge: HOME | End: 2022-05-18
Attending: INTERNAL MEDICINE | Admitting: INTERNAL MEDICINE

## 2022-05-18 VITALS
SYSTOLIC BLOOD PRESSURE: 154 MMHG | HEIGHT: 74 IN | WEIGHT: 216.4 LBS | DIASTOLIC BLOOD PRESSURE: 80 MMHG | OXYGEN SATURATION: 98 % | TEMPERATURE: 97.4 F | BODY MASS INDEX: 27.77 KG/M2 | RESPIRATION RATE: 19 BRPM | HEART RATE: 60 BPM

## 2022-05-18 DIAGNOSIS — I49.5 SSS (SICK SINUS SYNDROME): ICD-10-CM

## 2022-05-18 LAB
ANION GAP SERPL CALCULATED.3IONS-SCNC: 11 MMOL/L (ref 5–15)
BUN SERPL-MCNC: 20 MG/DL (ref 8–23)
BUN/CREAT SERPL: 19.6 (ref 7–25)
CALCIUM SPEC-SCNC: 9 MG/DL (ref 8.6–10.5)
CHLORIDE SERPL-SCNC: 102 MMOL/L (ref 98–107)
CO2 SERPL-SCNC: 25 MMOL/L (ref 22–29)
CREAT SERPL-MCNC: 1.02 MG/DL (ref 0.76–1.27)
DEPRECATED RDW RBC AUTO: 41.1 FL (ref 37–54)
EGFRCR SERPLBLD CKD-EPI 2021: 75.7 ML/MIN/1.73
ERYTHROCYTE [DISTWIDTH] IN BLOOD BY AUTOMATED COUNT: 13.2 % (ref 12.3–15.4)
GLUCOSE BLDC GLUCOMTR-MCNC: 166 MG/DL (ref 70–130)
GLUCOSE SERPL-MCNC: 223 MG/DL (ref 65–99)
HCT VFR BLD AUTO: 42.6 % (ref 37.5–51)
HGB BLD-MCNC: 14.4 G/DL (ref 13–17.7)
MCH RBC QN AUTO: 29.3 PG (ref 26.6–33)
MCHC RBC AUTO-ENTMCNC: 33.8 G/DL (ref 31.5–35.7)
MCV RBC AUTO: 86.8 FL (ref 79–97)
PLATELET # BLD AUTO: 204 10*3/MM3 (ref 140–450)
PMV BLD AUTO: 11.6 FL (ref 6–12)
POTASSIUM SERPL-SCNC: 4.5 MMOL/L (ref 3.5–5.2)
RBC # BLD AUTO: 4.91 10*6/MM3 (ref 4.14–5.8)
SODIUM SERPL-SCNC: 138 MMOL/L (ref 136–145)
WBC NRBC COR # BLD: 7.53 10*3/MM3 (ref 3.4–10.8)

## 2022-05-18 PROCEDURE — 85027 COMPLETE CBC AUTOMATED: CPT | Performed by: INTERNAL MEDICINE

## 2022-05-18 PROCEDURE — C1785 PMKR, DUAL, RATE-RESP: HCPCS | Performed by: INTERNAL MEDICINE

## 2022-05-18 PROCEDURE — 25010000002 CEFAZOLIN IN DEXTROSE 2-4 GM/100ML-% SOLUTION: Performed by: INTERNAL MEDICINE

## 2022-05-18 PROCEDURE — 82962 GLUCOSE BLOOD TEST: CPT

## 2022-05-18 PROCEDURE — 71045 X-RAY EXAM CHEST 1 VIEW: CPT

## 2022-05-18 PROCEDURE — 25010000002 MIDAZOLAM PER 1 MG: Performed by: INTERNAL MEDICINE

## 2022-05-18 PROCEDURE — C1898 LEAD, PMKR, OTHER THAN TRANS: HCPCS | Performed by: INTERNAL MEDICINE

## 2022-05-18 PROCEDURE — 80048 BASIC METABOLIC PNL TOTAL CA: CPT | Performed by: INTERNAL MEDICINE

## 2022-05-18 PROCEDURE — 33208 INSRT HEART PM ATRIAL & VENT: CPT | Performed by: INTERNAL MEDICINE

## 2022-05-18 PROCEDURE — 0 IOPAMIDOL PER 1 ML: Performed by: INTERNAL MEDICINE

## 2022-05-18 PROCEDURE — C1892 INTRO/SHEATH,FIXED,PEEL-AWAY: HCPCS | Performed by: INTERNAL MEDICINE

## 2022-05-18 PROCEDURE — 25010000002 FENTANYL CITRATE (PF) 50 MCG/ML SOLUTION: Performed by: INTERNAL MEDICINE

## 2022-05-18 DEVICE — LD PM TENDRIL STS 6F58CM 2088TC58: Type: IMPLANTABLE DEVICE | Site: HEART | Status: FUNCTIONAL

## 2022-05-18 DEVICE — LD PM TENDRIL STS 6F52CM 2088TC52: Type: IMPLANTABLE DEVICE | Site: HEART | Status: FUNCTIONAL

## 2022-05-18 DEVICE — GEN PM ASSURITY MRI DR RF PM2272: Type: IMPLANTABLE DEVICE | Site: HEART | Status: FUNCTIONAL

## 2022-05-18 RX ORDER — SODIUM CHLORIDE 9 MG/ML
250 INJECTION, SOLUTION INTRAVENOUS CONTINUOUS
Status: ACTIVE | OUTPATIENT
Start: 2022-05-18 | End: 2022-05-18

## 2022-05-18 RX ORDER — FENTANYL CITRATE 50 UG/ML
INJECTION, SOLUTION INTRAMUSCULAR; INTRAVENOUS AS NEEDED
Status: DISCONTINUED | OUTPATIENT
Start: 2022-05-18 | End: 2022-05-18 | Stop reason: HOSPADM

## 2022-05-18 RX ORDER — LIDOCAINE HYDROCHLORIDE 10 MG/ML
INJECTION, SOLUTION EPIDURAL; INFILTRATION; INTRACAUDAL; PERINEURAL AS NEEDED
Status: DISCONTINUED | OUTPATIENT
Start: 2022-05-18 | End: 2022-05-18 | Stop reason: HOSPADM

## 2022-05-18 RX ORDER — BUPIVACAINE HCL/0.9 % NACL/PF 0.1 %
2 PLASTIC BAG, INJECTION (ML) EPIDURAL ONCE
Status: DISCONTINUED | OUTPATIENT
Start: 2022-05-18 | End: 2022-05-18 | Stop reason: HOSPADM

## 2022-05-18 RX ORDER — MIDAZOLAM HYDROCHLORIDE 1 MG/ML
INJECTION INTRAMUSCULAR; INTRAVENOUS AS NEEDED
Status: DISCONTINUED | OUTPATIENT
Start: 2022-05-18 | End: 2022-05-18 | Stop reason: HOSPADM

## 2022-05-18 RX ORDER — CEFAZOLIN SODIUM 2 G/100ML
INJECTION, SOLUTION INTRAVENOUS CONTINUOUS PRN
Status: COMPLETED | OUTPATIENT
Start: 2022-05-18 | End: 2022-05-18

## 2022-05-18 NOTE — H&P
[facility] History & Physical    Referring Provider: Cooper Apodaca MD    Patient Care Team:  Farooq Painter MD as PCP - General (Family Medicine)  Jermaine Hernandez MD as Consulting Physician (Cardiology)    Chief complaint fatigue    Subjective .     History of present illness: 77-year-old man with coronary artery disease and, previous CABG, hypertension and hyperlipidemia as well as peripheral vascular disease admitted for permanent pacemaker implantation.  Patient underwent bypass surgery in March 2019.  He initially pursued a stable course but over the past 6 months has developed increasing fatigue, dyspnea on exertion and lethargy.  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  He has not had syncope but he has had near syncope.  He has no present angina    Review of System  A 14 point review of systems was negative except as was stated in the HPI    History  Past Medical History:   Diagnosis Date   • Asthma    • Coronary artery disease    • Diabetes mellitus (HCC) 2000    started on inuslin 12/2018; started on po meds in 2000; checking blood sugars daily    • Disease of thyroid gland     po meds daily for hypothyroidism    • History of fracture as a child     rt leg- severe    • Hyperlipidemia    • Hypertension    • Hypothyroidism    • Peripheral neuropathy    • Peripheral vascular disease (HCC)     s/p angiogram 2/19-needs stent in left leg    • RBBB    • Right knee pain    • Vitamin D deficiency      Past Surgical History:   Procedure Laterality Date   • APPENDECTOMY     • CARDIAC CATHETERIZATION N/A 2/14/2019    Procedure: Left Heart Cath;  Surgeon: Cooper Apodaca MD;  Location: [facility] INVASIVE LOCATION;  Service: Cardiology   • COLONOSCOPY     • CORONARY ARTERY BYPASS GRAFT N/A 3/22/2019    Procedure: MEDIAN STERNOTOMY, CORONARY ARTERY BYPASS GRAFT X3, UTILIZING THE LEFT INTERNAL MAMMARY ARTERY, EVH AND  OPEN HARVEST OF THE RIGHT GREATER SAPHENOUS VEIN, EXPLORATION OF THE LEFT LEG;  Surgeon: Ap Au MD;  Location:  [facility];  Service: Cardiothoracic   • EYE SURGERY Bilateral     cataracts    • INTERVENTIONAL RADIOLOGY PROCEDURE N/A 7/29/2021    Procedure: Abdominal Aortagram with Runoff;  Surgeon: Jermaine Hernandez MD;  Location:  [facility] C[facility] INVASIVE LOCATION;  Service: Cardiovascular;  Laterality: N/A;   • KNEE ARTHROSCOPY      right x 2, left x 1   • LACERATION REPAIR      right leg   • LEG SURGERY      2 for fracture of rt leg    • TONSILLECTOMY      Adnoidectomy     Family History   Problem Relation Age of Onset   • Coronary artery disease Mother    • Diabetes Mother    • Cancer Father      Social History     Tobacco Use   • Smoking status: Never Smoker   • Smokeless tobacco: Never Used   Substance Use Topics   • Alcohol use: No   • Drug use: No     Medications Prior to Admission   Medication Sig Dispense Refill Last Dose   • amLODIPine (NORVASC) 5 MG tablet Take 5 mg by mouth Every Morning.   5/18/2022 at Unknown time   • aspirin 81 MG chewable tablet Chew 81 mg Daily.   5/18/2022 at Unknown time   • cetirizine (zyrTEC) 10 MG tablet Take 10 mg by mouth Daily.   5/18/2022 at Unknown time   • Cholecalciferol (VITAMIN D3) 5000 units tablet tablet Take 5,000 Units by mouth Daily.   5/18/2022 at Unknown time   • insulin detemir (LEVEMIR FLEXTOUCH) 100 UNIT/ML injection Inject 44 Units under the skin into the appropriate area as directed Every Night. (Patient taking differently: Inject 34 Units under the skin into the appropriate area as directed Every Night.)   5/17/2022 at Unknown time   • insulin lispro (humaLOG) 100 UNIT/ML injection Inject 5 Units under the skin into the appropriate area as directed 3 (Three) Times a Day Before Meals. 1 each 1 5/18/2022 at Unknown time   • levothyroxine (SYNTHROID, LEVOTHROID) 50 MCG tablet Take 50 mcg by mouth Every Morning.   5/18/2022 at Unknown time   •  "metoprolol succinate XL (TOPROL-XL) 50 MG 24 hr tablet Take 1 tablet by mouth Daily. 20 tablet 0 5/18/2022 at Unknown time   • sodium chloride (OCEAN) 0.65 % nasal spray 1 spray into the nostril(s) as directed by provider As Needed for Congestion.   Past Week at Unknown time   • Blood Glucose Monitoring Suppl (ONE TOUCH ULTRA 2) w/Device kit USE TO CHECK GLUCOSE ONCE DAILY AS DIRECTED      • Continuous Blood Gluc  (DEXCOM G6 ) device See Admin Instructions.      • Continuous Blood Gluc Sensor (DEXCOM G6 SENSOR) APPLY 1 SENSOR AND CHANGE EVERY 10 DAYS      • Continuous Blood Gluc Sensor (Dexcom G6 Sensor) Every 10 (Ten) Days. 1 each 0    • Continuous Blood Gluc Transmit (DEXCOM G6 TRANSMITTER) misc ONE TRANSMITTER FOR EVERY 3 MONTHS      • MM PEN NEEDLES 32G X 4 MM misc USE 4 PER DAY        Scheduled Meds:    Continuous Infusions:  No current facility-administered medications for this encounter.    PRN Meds:    Allergies:  Patient has no known allergies.    Objective     Vital Sign Min/Max for last 24 hours  Temp  Min: 97.4 °F (36.3 °C)  Max: 97.4 °F (36.3 °C)   BP  Min: 165/93  Max: 187/99   Pulse  Min: 64  Max: 64   No data recorded   SpO2  Min: 98 %  Max: 98 %   No data recorded   Weight  Min: 98.2 kg (216 lb 6.4 oz)  Max: 98.2 kg (216 lb 6.4 oz)     Flowsheet Rows    Flowsheet Row First Filed Value   Admission Height 188 cm (74\") Documented at 05/18/2022 0643   Admission Weight 98.2 kg (216 lb 6.4 oz) Documented at 05/18/2022 0643             Physical Exam:  General Appearance: Alert, appears stated age and cooperative  Lungs: Clear to ascultation  Heart:: RRR   No Murmurs, Rubs or Gallops  Abdomen: Soft and nontender with adequate bowel sounds.  No organomegaly  Extremities: No cyanosis, clubbing or edema  Pulses: Pulses palpable and equal bilaterally  Skin: Warm and dry with no rash  Psych: Normal  Diminished pulses in feet bilaterally    Results Review:   I reviewed the patient's new clinical " results.          Lab Results   Lab Value Date/Time    TROPONINT 0.013 10/20/2021 1705    TROPONINT <0.010 10/20/2021 1508                         SSS (sick sinus syndrome) (HCC)      Impression      Sick sinus syndrome/trifascicular block/near syncope/chronotropic incompetence        Plan     Dual-chamber pacemaker implantation today        I discussed the patients findings and my recommendations with patient    Cooper Apodaca MD   05/18/22  07:04 EDT

## 2022-05-19 ENCOUNTER — CALL CENTER PROGRAMS (OUTPATIENT)
Dept: CALL CENTER | Facility: HOSPITAL | Age: 77
End: 2022-05-19

## 2022-05-19 NOTE — OUTREACH NOTE
NP at bedside   PCI/Device Survey    Flowsheet Row Responses   Facility patient discharged from? [facility]   Procedure date 05/18/22   Procedure (if device, specify in description) Device   Device Description GEN PM ASSURITY MRI DR BRANCH [id number] ST ZOILA MODEL NO. [id number] Serial No. [id number]   Performing MD Dr. Cooper Apodaca   Attempt successful? Yes   Call start time 1217   Call end time 1226   Has the patient had any of the following symptoms since discharge? --  [N/A]   Is the patient taking prescribed medications: ASA   Nursing intervention Reminded to continue to take prescribed medications   Does the patient have any of the following symptoms related to the cath/surgical site? --  [N/A]   Nursing intervention Patient education provided   Does the patient have an appointment scheduled with the cardiologist? No   If the patient is a current smoker, are they able to teach back resources for cessation? Not a smoker   Did the patient feel prepared to go home on the same day as the procedure? Yes   Is the patient satisfied with the same day discharge process? Yes   Discharge process comments Very pleased with cardiac intake unit, her name was Nahomy. He enjoyed [facility]. Pt is afraid of needles.    PCI/Device call completed Yes          EDDIE CARVER - Registered Nurse

## 2022-07-27 ENCOUNTER — APPOINTMENT (OUTPATIENT)
Dept: GENERAL RADIOLOGY | Facility: HOSPITAL | Age: 77
End: 2022-07-27

## 2022-07-27 ENCOUNTER — HOSPITAL ENCOUNTER (INPATIENT)
Facility: HOSPITAL | Age: 77
LOS: 30 days | Discharge: NURSING, REHAB OR LONG-TERM CARE | End: 2022-08-26
Attending: EMERGENCY MEDICINE | Admitting: PEDIATRICS

## 2022-07-27 DIAGNOSIS — L03.116 CELLULITIS OF LEFT FOOT: ICD-10-CM

## 2022-07-27 DIAGNOSIS — Z78.9 FAILURE OF OUTPATIENT TREATMENT: ICD-10-CM

## 2022-07-27 DIAGNOSIS — S43.005A DISLOCATION OF LEFT SHOULDER JOINT, INITIAL ENCOUNTER: Primary | ICD-10-CM

## 2022-07-27 DIAGNOSIS — M86.272 SUBACUTE OSTEOMYELITIS OF LEFT FOOT: ICD-10-CM

## 2022-07-27 PROBLEM — R79.82 ELEVATED C-REACTIVE PROTEIN (CRP): Status: ACTIVE | Noted: 2022-07-27

## 2022-07-27 PROBLEM — R70.0 ELEVATED SED RATE (ELEV SR): Status: ACTIVE | Noted: 2022-07-27

## 2022-07-27 PROBLEM — L03.119 CELLULITIS IN DIABETIC FOOT: Status: ACTIVE | Noted: 2022-07-27

## 2022-07-27 PROBLEM — E11.9 TYPE 2 DIABETES MELLITUS: Status: ACTIVE | Noted: 2019-03-22

## 2022-07-27 PROBLEM — D72.829 LEUKOCYTOSIS: Status: ACTIVE | Noted: 2022-07-27

## 2022-07-27 PROBLEM — E11.628 CELLULITIS IN DIABETIC FOOT (HCC): Status: ACTIVE | Noted: 2022-07-27

## 2022-07-27 PROBLEM — E87.20 LACTIC ACIDOSIS: Status: ACTIVE | Noted: 2022-07-27

## 2022-07-27 PROBLEM — A41.9 SEPSIS: Status: ACTIVE | Noted: 2022-07-27
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
## 2022-07-27 LAB
ALBUMIN SERPL-MCNC: 3.7 G/DL (ref 3.5–5.2)
ALBUMIN/GLOB SERPL: 1.1 G/DL
ALP SERPL-CCNC: 81 U/L (ref 39–117)
ALT SERPL W P-5'-P-CCNC: 19 U/L (ref 1–41)
ANION GAP SERPL CALCULATED.3IONS-SCNC: 10 MMOL/L (ref 5–15)
AST SERPL-CCNC: 15 U/L (ref 1–40)
BASOPHILS # BLD AUTO: 0.06 10*3/MM3 (ref 0–0.2)
BASOPHILS NFR BLD AUTO: 0.5 % (ref 0–1.5)
BILIRUB SERPL-MCNC: 0.2 MG/DL (ref 0–1.2)
BUN SERPL-MCNC: 22 MG/DL (ref 8–23)
BUN/CREAT SERPL: 21 (ref 7–25)
CALCIUM SPEC-SCNC: 9.2 MG/DL (ref 8.6–10.5)
CHLORIDE SERPL-SCNC: 102 MMOL/L (ref 98–107)
CO2 SERPL-SCNC: 26 MMOL/L (ref 22–29)
CREAT SERPL-MCNC: 1.05 MG/DL (ref 0.76–1.27)
CRP SERPL-MCNC: 2.33 MG/DL (ref 0–0.5)
D-LACTATE SERPL-SCNC: 2.6 MMOL/L (ref 0.5–2)
DEPRECATED RDW RBC AUTO: 39.2 FL (ref 37–54)
EGFRCR SERPLBLD CKD-EPI 2021: 73.1 ML/MIN/1.73
EOSINOPHIL # BLD AUTO: 0.14 10*3/MM3 (ref 0–0.4)
EOSINOPHIL NFR BLD AUTO: 1.1 % (ref 0.3–6.2)
ERYTHROCYTE [DISTWIDTH] IN BLOOD BY AUTOMATED COUNT: 12.5 % (ref 12.3–15.4)
ERYTHROCYTE [SEDIMENTATION RATE] IN BLOOD: 43 MM/HR (ref 0–20)
FLUAV SUBTYP SPEC NAA+PROBE: NOT DETECTED
FLUBV RNA ISLT QL NAA+PROBE: NOT DETECTED
GLOBULIN UR ELPH-MCNC: 3.4 GM/DL
GLUCOSE SERPL-MCNC: 273 MG/DL (ref 65–99)
HCT VFR BLD AUTO: 41.2 % (ref 37.5–51)
HGB BLD-MCNC: 14.2 G/DL (ref 13–17.7)
IMM GRANULOCYTES # BLD AUTO: 0.04 10*3/MM3 (ref 0–0.05)
IMM GRANULOCYTES NFR BLD AUTO: 0.3 % (ref 0–0.5)
LYMPHOCYTES # BLD AUTO: 1.09 10*3/MM3 (ref 0.7–3.1)
LYMPHOCYTES NFR BLD AUTO: 8.9 % (ref 19.6–45.3)
MCH RBC QN AUTO: 29.6 PG (ref 26.6–33)
MCHC RBC AUTO-ENTMCNC: 34.5 G/DL (ref 31.5–35.7)
MCV RBC AUTO: 86 FL (ref 79–97)
MONOCYTES # BLD AUTO: 0.94 10*3/MM3 (ref 0.1–0.9)
MONOCYTES NFR BLD AUTO: 7.7 % (ref 5–12)
NEUTROPHILS NFR BLD AUTO: 81.5 % (ref 42.7–76)
NEUTROPHILS NFR BLD AUTO: 9.95 10*3/MM3 (ref 1.7–7)
NRBC BLD AUTO-RTO: 0 /100 WBC (ref 0–0.2)
PLATELET # BLD AUTO: 239 10*3/MM3 (ref 140–450)
PMV BLD AUTO: 11 FL (ref 6–12)
POTASSIUM SERPL-SCNC: 4.5 MMOL/L (ref 3.5–5.2)
PROCALCITONIN SERPL-MCNC: 0.08 NG/ML (ref 0–0.25)
PROT SERPL-MCNC: 7.1 G/DL (ref 6–8.5)
RBC # BLD AUTO: 4.79 10*6/MM3 (ref 4.14–5.8)
SARS-COV-2 RNA PNL SPEC NAA+PROBE: NOT DETECTED
SODIUM SERPL-SCNC: 138 MMOL/L (ref 136–145)
WBC NRBC COR # BLD: 12.22 10*3/MM3 (ref 3.4–10.8)

## 2022-07-27 PROCEDURE — 99152 MOD SED SAME PHYS/QHP 5/>YRS: CPT

## 2022-07-27 PROCEDURE — 73030 X-RAY EXAM OF SHOULDER: CPT

## 2022-07-27 PROCEDURE — 86140 C-REACTIVE PROTEIN: CPT | Performed by: NURSE PRACTITIONER

## 2022-07-27 PROCEDURE — 87040 BLOOD CULTURE FOR BACTERIA: CPT | Performed by: NURSE PRACTITIONER

## 2022-07-27 PROCEDURE — 85025 COMPLETE CBC W/AUTO DIFF WBC: CPT | Performed by: NURSE PRACTITIONER

## 2022-07-27 PROCEDURE — 25010000002 PROPOFOL 10 MG/ML EMULSION: Performed by: EMERGENCY MEDICINE

## 2022-07-27 PROCEDURE — 83605 ASSAY OF LACTIC ACID: CPT | Performed by: NURSE PRACTITIONER

## 2022-07-27 PROCEDURE — 73630 X-RAY EXAM OF FOOT: CPT

## 2022-07-27 PROCEDURE — 0PSDXZZ REPOSITION LEFT HUMERAL HEAD, EXTERNAL APPROACH: ICD-10-PCS | Performed by: PEDIATRICS

## 2022-07-27 PROCEDURE — 25010000002 HYDROMORPHONE PER 4 MG: Performed by: EMERGENCY MEDICINE

## 2022-07-27 PROCEDURE — 84145 PROCALCITONIN (PCT): CPT | Performed by: NURSE PRACTITIONER

## 2022-07-27 PROCEDURE — 25010000002 ONDANSETRON PER 1 MG: Performed by: EMERGENCY MEDICINE

## 2022-07-27 PROCEDURE — 99285 EMERGENCY DEPT VISIT HI MDM: CPT

## 2022-07-27 PROCEDURE — 87636 SARSCOV2 & INF A&B AMP PRB: CPT | Performed by: INTERNAL MEDICINE

## 2022-07-27 PROCEDURE — 99223 1ST HOSP IP/OBS HIGH 75: CPT | Performed by: INTERNAL MEDICINE

## 2022-07-27 PROCEDURE — 85652 RBC SED RATE AUTOMATED: CPT | Performed by: NURSE PRACTITIONER

## 2022-07-27 PROCEDURE — 80053 COMPREHEN METABOLIC PANEL: CPT | Performed by: NURSE PRACTITIONER

## 2022-07-27 RX ORDER — SODIUM CHLORIDE 0.9 % (FLUSH) 0.9 %
10 SYRINGE (ML) INJECTION AS NEEDED
Status: DISCONTINUED | OUTPATIENT
Start: 2022-07-27 | End: 2022-08-22

## 2022-07-27 RX ORDER — PROPOFOL 10 MG/ML
VIAL (ML) INTRAVENOUS
Status: COMPLETED | OUTPATIENT
Start: 2022-07-27 | End: 2022-07-27

## 2022-07-27 RX ORDER — HYDROMORPHONE HYDROCHLORIDE 1 MG/ML
0.5 INJECTION, SOLUTION INTRAMUSCULAR; INTRAVENOUS; SUBCUTANEOUS ONCE
Status: COMPLETED | OUTPATIENT
Start: 2022-07-27 | End: 2022-07-27

## 2022-07-27 RX ORDER — ONDANSETRON 2 MG/ML
4 INJECTION INTRAMUSCULAR; INTRAVENOUS ONCE
Status: COMPLETED | OUTPATIENT
Start: 2022-07-27 | End: 2022-07-27

## 2022-07-27 RX ORDER — PROPOFOL 10 MG/ML
200 VIAL (ML) INTRAVENOUS ONCE
Status: DISCONTINUED | OUTPATIENT
Start: 2022-07-27 | End: 2022-08-03

## 2022-07-27 RX ORDER — HYDROMORPHONE HYDROCHLORIDE 1 MG/ML
0.5 INJECTION, SOLUTION INTRAMUSCULAR; INTRAVENOUS; SUBCUTANEOUS
Status: DISCONTINUED | OUTPATIENT
Start: 2022-07-27 | End: 2022-08-03

## 2022-07-27 RX ORDER — HYDROCODONE BITARTRATE AND ACETAMINOPHEN 5; 325 MG/1; MG/1
1 TABLET ORAL EVERY 6 HOURS PRN
Status: DISCONTINUED | OUTPATIENT
Start: 2022-07-27 | End: 2022-08-03

## 2022-07-27 RX ADMIN — PROPOFOL 20 MG: 10 INJECTION, EMULSION INTRAVENOUS at 22:23

## 2022-07-27 RX ADMIN — HYDROMORPHONE HYDROCHLORIDE 0.5 MG: 1 INJECTION, SOLUTION INTRAMUSCULAR; INTRAVENOUS; SUBCUTANEOUS at 20:44

## 2022-07-27 RX ADMIN — ONDANSETRON 4 MG: 2 INJECTION INTRAMUSCULAR; INTRAVENOUS at 20:43

## 2022-07-27 RX ADMIN — PROPOFOL 50 MG: 10 INJECTION, EMULSION INTRAVENOUS at 22:22

## 2022-07-28 ENCOUNTER — APPOINTMENT (OUTPATIENT)
Dept: CARDIOLOGY | Facility: HOSPITAL | Age: 77
End: 2022-07-28

## 2022-07-28 PROBLEM — S92.919A PHALANX FRACTURE, FOOT: Status: ACTIVE | Noted: 2022-07-28

## 2022-07-28 LAB
ANION GAP SERPL CALCULATED.3IONS-SCNC: 5 MMOL/L (ref 5–15)
BASOPHILS # BLD AUTO: 0.04 10*3/MM3 (ref 0–0.2)
BASOPHILS NFR BLD AUTO: 0.4 % (ref 0–1.5)
BUN SERPL-MCNC: 20 MG/DL (ref 8–23)
BUN/CREAT SERPL: 21.5 (ref 7–25)
CALCIUM SPEC-SCNC: 9.2 MG/DL (ref 8.6–10.5)
CHLORIDE SERPL-SCNC: 108 MMOL/L (ref 98–107)
CO2 SERPL-SCNC: 31 MMOL/L (ref 22–29)
CREAT SERPL-MCNC: 0.93 MG/DL (ref 0.76–1.27)
D-LACTATE SERPL-SCNC: 1.5 MMOL/L (ref 0.5–2)
D-LACTATE SERPL-SCNC: 2.2 MMOL/L (ref 0.5–2)
DEPRECATED RDW RBC AUTO: 37.8 FL (ref 37–54)
EGFRCR SERPLBLD CKD-EPI 2021: 84.6 ML/MIN/1.73
EOSINOPHIL # BLD AUTO: 0.11 10*3/MM3 (ref 0–0.4)
EOSINOPHIL NFR BLD AUTO: 1 % (ref 0.3–6.2)
ERYTHROCYTE [DISTWIDTH] IN BLOOD BY AUTOMATED COUNT: 12.4 % (ref 12.3–15.4)
GLUCOSE BLDC GLUCOMTR-MCNC: 155 MG/DL (ref 70–130)
GLUCOSE BLDC GLUCOMTR-MCNC: 206 MG/DL (ref 70–130)
GLUCOSE BLDC GLUCOMTR-MCNC: 233 MG/DL (ref 70–130)
GLUCOSE BLDC GLUCOMTR-MCNC: 270 MG/DL (ref 70–130)
GLUCOSE BLDC GLUCOMTR-MCNC: 294 MG/DL (ref 70–130)
GLUCOSE SERPL-MCNC: 192 MG/DL (ref 65–99)
HBA1C MFR BLD: 9 % (ref 4.8–5.6)
HCT VFR BLD AUTO: 37.3 % (ref 37.5–51)
HGB BLD-MCNC: 12.9 G/DL (ref 13–17.7)
IMM GRANULOCYTES # BLD AUTO: 0.03 10*3/MM3 (ref 0–0.05)
IMM GRANULOCYTES NFR BLD AUTO: 0.3 % (ref 0–0.5)
LYMPHOCYTES # BLD AUTO: 1.57 10*3/MM3 (ref 0.7–3.1)
LYMPHOCYTES NFR BLD AUTO: 14.4 % (ref 19.6–45.3)
MAGNESIUM SERPL-MCNC: 2 MG/DL (ref 1.6–2.4)
MCH RBC QN AUTO: 29.3 PG (ref 26.6–33)
MCHC RBC AUTO-ENTMCNC: 34.6 G/DL (ref 31.5–35.7)
MCV RBC AUTO: 84.8 FL (ref 79–97)
MONOCYTES # BLD AUTO: 0.97 10*3/MM3 (ref 0.1–0.9)
MONOCYTES NFR BLD AUTO: 8.9 % (ref 5–12)
NEUTROPHILS NFR BLD AUTO: 75 % (ref 42.7–76)
NEUTROPHILS NFR BLD AUTO: 8.15 10*3/MM3 (ref 1.7–7)
NRBC BLD AUTO-RTO: 0 /100 WBC (ref 0–0.2)
PLATELET # BLD AUTO: 237 10*3/MM3 (ref 140–450)
PMV BLD AUTO: 10.8 FL (ref 6–12)
POTASSIUM SERPL-SCNC: 4.9 MMOL/L (ref 3.5–5.2)
RBC # BLD AUTO: 4.4 10*6/MM3 (ref 4.14–5.8)
SODIUM SERPL-SCNC: 144 MMOL/L (ref 136–145)
VANCOMYCIN TROUGH SERPL-MCNC: 10 MCG/ML (ref 5–20)
WBC NRBC COR # BLD: 10.87 10*3/MM3 (ref 3.4–10.8)

## 2022-07-28 PROCEDURE — 25010000002 HEPARIN (PORCINE) PER 1000 UNITS: Performed by: NURSE PRACTITIONER

## 2022-07-28 PROCEDURE — 63710000001 INSULIN DETEMIR PER 5 UNITS: Performed by: STUDENT IN AN ORGANIZED HEALTH CARE EDUCATION/TRAINING PROGRAM

## 2022-07-28 PROCEDURE — 82962 GLUCOSE BLOOD TEST: CPT

## 2022-07-28 PROCEDURE — 25010000002 PIPERACILLIN SOD-TAZOBACTAM PER 1 G: Performed by: NURSE PRACTITIONER

## 2022-07-28 PROCEDURE — 99221 1ST HOSP IP/OBS SF/LOW 40: CPT | Performed by: ORTHOPAEDIC SURGERY

## 2022-07-28 PROCEDURE — 63710000001 INSULIN DETEMIR PER 5 UNITS: Performed by: NURSE PRACTITIONER

## 2022-07-28 PROCEDURE — 93926 LOWER EXTREMITY STUDY: CPT

## 2022-07-28 PROCEDURE — 83605 ASSAY OF LACTIC ACID: CPT | Performed by: NURSE PRACTITIONER

## 2022-07-28 PROCEDURE — 83036 HEMOGLOBIN GLYCOSYLATED A1C: CPT | Performed by: NURSE PRACTITIONER

## 2022-07-28 PROCEDURE — 25010000002 VANCOMYCIN PER 500 MG

## 2022-07-28 PROCEDURE — 63710000001 INSULIN LISPRO (HUMAN) PER 5 UNITS: Performed by: NURSE PRACTITIONER

## 2022-07-28 PROCEDURE — 85025 COMPLETE CBC W/AUTO DIFF WBC: CPT | Performed by: NURSE PRACTITIONER

## 2022-07-28 PROCEDURE — 80202 ASSAY OF VANCOMYCIN: CPT

## 2022-07-28 PROCEDURE — 25010000002 VANCOMYCIN 10 G RECONSTITUTED SOLUTION: Performed by: NURSE PRACTITIONER

## 2022-07-28 PROCEDURE — 99232 SBSQ HOSP IP/OBS MODERATE 35: CPT | Performed by: STUDENT IN AN ORGANIZED HEALTH CARE EDUCATION/TRAINING PROGRAM

## 2022-07-28 PROCEDURE — 80048 BASIC METABOLIC PNL TOTAL CA: CPT | Performed by: NURSE PRACTITIONER

## 2022-07-28 PROCEDURE — 83735 ASSAY OF MAGNESIUM: CPT | Performed by: NURSE PRACTITIONER

## 2022-07-28 RX ORDER — SODIUM CHLORIDE 9 MG/ML
75 INJECTION, SOLUTION INTRAVENOUS CONTINUOUS
Status: ACTIVE | OUTPATIENT
Start: 2022-07-28 | End: 2022-07-28

## 2022-07-28 RX ORDER — HEPARIN SODIUM 5000 [USP'U]/ML
5000 INJECTION, SOLUTION INTRAVENOUS; SUBCUTANEOUS EVERY 8 HOURS SCHEDULED
Status: DISCONTINUED | OUTPATIENT
Start: 2022-07-28 | End: 2022-08-11

## 2022-07-28 RX ORDER — ACETAMINOPHEN 325 MG/1
650 TABLET ORAL EVERY 4 HOURS PRN
Status: DISCONTINUED | OUTPATIENT
Start: 2022-07-28 | End: 2022-08-26 | Stop reason: HOSPADM

## 2022-07-28 RX ORDER — NICOTINE POLACRILEX 4 MG
15 LOZENGE BUCCAL
Status: DISCONTINUED | OUTPATIENT
Start: 2022-07-28 | End: 2022-08-26 | Stop reason: HOSPADM

## 2022-07-28 RX ORDER — METOPROLOL SUCCINATE 50 MG/1
50 TABLET, EXTENDED RELEASE ORAL DAILY
Status: DISCONTINUED | OUTPATIENT
Start: 2022-07-28 | End: 2022-08-01

## 2022-07-28 RX ORDER — SODIUM CHLORIDE 0.9 % (FLUSH) 0.9 %
10 SYRINGE (ML) INJECTION AS NEEDED
Status: DISCONTINUED | OUTPATIENT
Start: 2022-07-28 | End: 2022-08-22

## 2022-07-28 RX ORDER — ACETAMINOPHEN 160 MG/5ML
650 SOLUTION ORAL EVERY 4 HOURS PRN
Status: DISCONTINUED | OUTPATIENT
Start: 2022-07-28 | End: 2022-08-26 | Stop reason: HOSPADM

## 2022-07-28 RX ORDER — AMLODIPINE BESYLATE 5 MG/1
5 TABLET ORAL DAILY
Status: DISCONTINUED | OUTPATIENT
Start: 2022-07-28 | End: 2022-07-30

## 2022-07-28 RX ORDER — LEVOTHYROXINE SODIUM 0.05 MG/1
50 TABLET ORAL
Status: DISCONTINUED | OUTPATIENT
Start: 2022-07-28 | End: 2022-08-26 | Stop reason: HOSPADM

## 2022-07-28 RX ORDER — ASPIRIN 81 MG/1
81 TABLET, CHEWABLE ORAL DAILY
Status: DISCONTINUED | OUTPATIENT
Start: 2022-07-28 | End: 2022-08-26 | Stop reason: HOSPADM

## 2022-07-28 RX ORDER — VANCOMYCIN HYDROCHLORIDE 1 G/200ML
1000 INJECTION, SOLUTION INTRAVENOUS
Status: DISCONTINUED | OUTPATIENT
Start: 2022-07-28 | End: 2022-08-01

## 2022-07-28 RX ORDER — SODIUM CHLORIDE 0.9 % (FLUSH) 0.9 %
10 SYRINGE (ML) INJECTION EVERY 12 HOURS SCHEDULED
Status: DISCONTINUED | OUTPATIENT
Start: 2022-07-28 | End: 2022-08-22

## 2022-07-28 RX ORDER — DEXTROSE MONOHYDRATE 25 G/50ML
25 INJECTION, SOLUTION INTRAVENOUS
Status: DISCONTINUED | OUTPATIENT
Start: 2022-07-28 | End: 2022-08-26 | Stop reason: HOSPADM

## 2022-07-28 RX ORDER — CETIRIZINE HYDROCHLORIDE 10 MG/1
5 TABLET ORAL DAILY
Status: DISCONTINUED | OUTPATIENT
Start: 2022-07-28 | End: 2022-08-26 | Stop reason: HOSPADM

## 2022-07-28 RX ORDER — INSULIN LISPRO 100 [IU]/ML
0-9 INJECTION, SOLUTION INTRAVENOUS; SUBCUTANEOUS
Status: DISCONTINUED | OUTPATIENT
Start: 2022-07-28 | End: 2022-08-03

## 2022-07-28 RX ORDER — ACETAMINOPHEN 650 MG/1
650 SUPPOSITORY RECTAL EVERY 4 HOURS PRN
Status: DISCONTINUED | OUTPATIENT
Start: 2022-07-28 | End: 2022-08-26 | Stop reason: HOSPADM

## 2022-07-28 RX ADMIN — VANCOMYCIN HYDROCHLORIDE 1000 MG: 1 INJECTION, SOLUTION INTRAVENOUS at 17:37

## 2022-07-28 RX ADMIN — VANCOMYCIN HYDROCHLORIDE 2000 MG: 10 INJECTION, POWDER, LYOPHILIZED, FOR SOLUTION INTRAVENOUS at 00:40

## 2022-07-28 RX ADMIN — INSULIN DETEMIR 15 UNITS: 100 INJECTION, SOLUTION SUBCUTANEOUS at 01:07

## 2022-07-28 RX ADMIN — TAZOBACTAM SODIUM AND PIPERACILLIN SODIUM 3.38 G: 375; 3 INJECTION, SOLUTION INTRAVENOUS at 02:50

## 2022-07-28 RX ADMIN — Medication 10 ML: at 21:31

## 2022-07-28 RX ADMIN — HEPARIN SODIUM 5000 UNITS: 5000 INJECTION INTRAVENOUS; SUBCUTANEOUS at 21:30

## 2022-07-28 RX ADMIN — CETIRIZINE HYDROCHLORIDE 5 MG: 10 TABLET, FILM COATED ORAL at 08:10

## 2022-07-28 RX ADMIN — INSULIN LISPRO 4 UNITS: 100 INJECTION, SOLUTION INTRAVENOUS; SUBCUTANEOUS at 08:10

## 2022-07-28 RX ADMIN — Medication 10 ML: at 08:11

## 2022-07-28 RX ADMIN — INSULIN LISPRO 4 UNITS: 100 INJECTION, SOLUTION INTRAVENOUS; SUBCUTANEOUS at 11:47

## 2022-07-28 RX ADMIN — INSULIN DETEMIR 20 UNITS: 100 INJECTION, SOLUTION SUBCUTANEOUS at 21:30

## 2022-07-28 RX ADMIN — SODIUM CHLORIDE 75 ML/HR: 9 INJECTION, SOLUTION INTRAVENOUS at 02:49

## 2022-07-28 RX ADMIN — INSULIN LISPRO 6 UNITS: 100 INJECTION, SOLUTION INTRAVENOUS; SUBCUTANEOUS at 21:30

## 2022-07-28 RX ADMIN — LEVOTHYROXINE SODIUM 50 MCG: 50 TABLET ORAL at 05:20

## 2022-07-28 RX ADMIN — TAZOBACTAM SODIUM AND PIPERACILLIN SODIUM 3.38 G: 375; 3 INJECTION, SOLUTION INTRAVENOUS at 23:22

## 2022-07-28 RX ADMIN — ASPIRIN 81 MG CHEWABLE TABLET 81 MG: 81 TABLET CHEWABLE at 08:10

## 2022-07-28 RX ADMIN — INSULIN LISPRO 4 UNITS: 100 INJECTION, SOLUTION INTRAVENOUS; SUBCUTANEOUS at 01:07

## 2022-07-28 RX ADMIN — TAZOBACTAM SODIUM AND PIPERACILLIN SODIUM 3.38 G: 375; 3 INJECTION, SOLUTION INTRAVENOUS at 08:11

## 2022-07-28 RX ADMIN — HYDROCODONE BITARTRATE AND ACETAMINOPHEN 1 TABLET: 5; 325 TABLET ORAL at 04:37

## 2022-07-28 RX ADMIN — METOPROLOL SUCCINATE 50 MG: 50 TABLET, EXTENDED RELEASE ORAL at 08:10

## 2022-07-28 RX ADMIN — INSULIN LISPRO 2 UNITS: 100 INJECTION, SOLUTION INTRAVENOUS; SUBCUTANEOUS at 17:37

## 2022-07-28 RX ADMIN — AMLODIPINE BESYLATE 5 MG: 5 TABLET ORAL at 08:10

## 2022-07-28 RX ADMIN — HEPARIN SODIUM 5000 UNITS: 5000 INJECTION INTRAVENOUS; SUBCUTANEOUS at 05:20

## 2022-07-28 RX ADMIN — HYDROCODONE BITARTRATE AND ACETAMINOPHEN 1 TABLET: 5; 325 TABLET ORAL at 16:43

## 2022-07-28 RX ADMIN — HYDROCODONE BITARTRATE AND ACETAMINOPHEN 1 TABLET: 5; 325 TABLET ORAL at 10:25

## 2022-07-28 RX ADMIN — HEPARIN SODIUM 5000 UNITS: 5000 INJECTION INTRAVENOUS; SUBCUTANEOUS at 14:20

## 2022-07-28 RX ADMIN — TAZOBACTAM SODIUM AND PIPERACILLIN SODIUM 3.38 G: 375; 3 INJECTION, SOLUTION INTRAVENOUS at 16:43

## 2022-07-28 RX ADMIN — Medication 5000 UNITS: at 08:10

## 2022-07-29 ENCOUNTER — APPOINTMENT (OUTPATIENT)
Dept: NUCLEAR MEDICINE | Facility: HOSPITAL | Age: 77
End: 2022-07-29

## 2022-07-29 LAB
ANION GAP SERPL CALCULATED.3IONS-SCNC: 7 MMOL/L (ref 5–15)
BASOPHILS # BLD AUTO: 0.07 10*3/MM3 (ref 0–0.2)
BASOPHILS NFR BLD AUTO: 0.8 % (ref 0–1.5)
BUN SERPL-MCNC: 18 MG/DL (ref 8–23)
BUN/CREAT SERPL: 15.8 (ref 7–25)
CALCIUM SPEC-SCNC: 8.8 MG/DL (ref 8.6–10.5)
CHLORIDE SERPL-SCNC: 104 MMOL/L (ref 98–107)
CO2 SERPL-SCNC: 27 MMOL/L (ref 22–29)
CREAT SERPL-MCNC: 1.14 MG/DL (ref 0.76–1.27)
DEPRECATED RDW RBC AUTO: 39.8 FL (ref 37–54)
EGFRCR SERPLBLD CKD-EPI 2021: 66.2 ML/MIN/1.73
EOSINOPHIL # BLD AUTO: 0.36 10*3/MM3 (ref 0–0.4)
EOSINOPHIL NFR BLD AUTO: 4.3 % (ref 0.3–6.2)
ERYTHROCYTE [DISTWIDTH] IN BLOOD BY AUTOMATED COUNT: 12.5 % (ref 12.3–15.4)
GLUCOSE BLDC GLUCOMTR-MCNC: 162 MG/DL (ref 70–130)
GLUCOSE BLDC GLUCOMTR-MCNC: 193 MG/DL (ref 70–130)
GLUCOSE BLDC GLUCOMTR-MCNC: 195 MG/DL (ref 70–130)
GLUCOSE BLDC GLUCOMTR-MCNC: 219 MG/DL (ref 70–130)
GLUCOSE SERPL-MCNC: 253 MG/DL (ref 65–99)
HCT VFR BLD AUTO: 39 % (ref 37.5–51)
HGB BLD-MCNC: 13.2 G/DL (ref 13–17.7)
IMM GRANULOCYTES # BLD AUTO: 0.02 10*3/MM3 (ref 0–0.05)
IMM GRANULOCYTES NFR BLD AUTO: 0.2 % (ref 0–0.5)
LYMPHOCYTES # BLD AUTO: 1.88 10*3/MM3 (ref 0.7–3.1)
LYMPHOCYTES NFR BLD AUTO: 22.4 % (ref 19.6–45.3)
MCH RBC QN AUTO: 29.5 PG (ref 26.6–33)
MCHC RBC AUTO-ENTMCNC: 33.8 G/DL (ref 31.5–35.7)
MCV RBC AUTO: 87.1 FL (ref 79–97)
MONOCYTES # BLD AUTO: 0.82 10*3/MM3 (ref 0.1–0.9)
MONOCYTES NFR BLD AUTO: 9.8 % (ref 5–12)
NEUTROPHILS NFR BLD AUTO: 5.24 10*3/MM3 (ref 1.7–7)
NEUTROPHILS NFR BLD AUTO: 62.5 % (ref 42.7–76)
NRBC BLD AUTO-RTO: 0 /100 WBC (ref 0–0.2)
PLATELET # BLD AUTO: 233 10*3/MM3 (ref 140–450)
PMV BLD AUTO: 11 FL (ref 6–12)
POTASSIUM SERPL-SCNC: 4.2 MMOL/L (ref 3.5–5.2)
RBC # BLD AUTO: 4.48 10*6/MM3 (ref 4.14–5.8)
SODIUM SERPL-SCNC: 138 MMOL/L (ref 136–145)
VANCOMYCIN TROUGH SERPL-MCNC: 9.8 MCG/ML (ref 5–20)
WBC NRBC COR # BLD: 8.39 10*3/MM3 (ref 3.4–10.8)

## 2022-07-29 PROCEDURE — 82962 GLUCOSE BLOOD TEST: CPT

## 2022-07-29 PROCEDURE — 99231 SBSQ HOSP IP/OBS SF/LOW 25: CPT | Performed by: ORTHOPAEDIC SURGERY

## 2022-07-29 PROCEDURE — A9503 TC99M MEDRONATE: HCPCS | Performed by: INTERNAL MEDICINE

## 2022-07-29 PROCEDURE — 0 TECHNETIUM MEDRONATE KIT: Performed by: INTERNAL MEDICINE

## 2022-07-29 PROCEDURE — 97161 PT EVAL LOW COMPLEX 20 MIN: CPT

## 2022-07-29 PROCEDURE — 25010000002 VANCOMYCIN PER 500 MG

## 2022-07-29 PROCEDURE — 97116 GAIT TRAINING THERAPY: CPT

## 2022-07-29 PROCEDURE — 99232 SBSQ HOSP IP/OBS MODERATE 35: CPT | Performed by: INTERNAL MEDICINE

## 2022-07-29 PROCEDURE — 78315 BONE IMAGING 3 PHASE: CPT

## 2022-07-29 PROCEDURE — 97165 OT EVAL LOW COMPLEX 30 MIN: CPT

## 2022-07-29 PROCEDURE — 97110 THERAPEUTIC EXERCISES: CPT

## 2022-07-29 PROCEDURE — 25010000002 PIPERACILLIN SOD-TAZOBACTAM PER 1 G: Performed by: NURSE PRACTITIONER

## 2022-07-29 PROCEDURE — 25010000002 HEPARIN (PORCINE) PER 1000 UNITS: Performed by: NURSE PRACTITIONER

## 2022-07-29 PROCEDURE — 80048 BASIC METABOLIC PNL TOTAL CA: CPT | Performed by: STUDENT IN AN ORGANIZED HEALTH CARE EDUCATION/TRAINING PROGRAM

## 2022-07-29 PROCEDURE — 85025 COMPLETE CBC W/AUTO DIFF WBC: CPT | Performed by: STUDENT IN AN ORGANIZED HEALTH CARE EDUCATION/TRAINING PROGRAM

## 2022-07-29 PROCEDURE — 80202 ASSAY OF VANCOMYCIN: CPT

## 2022-07-29 PROCEDURE — 63710000001 INSULIN DETEMIR PER 5 UNITS: Performed by: STUDENT IN AN ORGANIZED HEALTH CARE EDUCATION/TRAINING PROGRAM

## 2022-07-29 PROCEDURE — 63710000001 INSULIN LISPRO (HUMAN) PER 5 UNITS: Performed by: NURSE PRACTITIONER

## 2022-07-29 RX ORDER — TC 99M MEDRONATE 20 MG/10ML
22 INJECTION, POWDER, LYOPHILIZED, FOR SOLUTION INTRAVENOUS
Status: COMPLETED | OUTPATIENT
Start: 2022-07-29 | End: 2022-07-29

## 2022-07-29 RX ADMIN — ACETAMINOPHEN 650 MG: 325 TABLET, FILM COATED ORAL at 14:26

## 2022-07-29 RX ADMIN — Medication 22 MILLICURIE: at 08:30

## 2022-07-29 RX ADMIN — CETIRIZINE HYDROCHLORIDE 5 MG: 10 TABLET, FILM COATED ORAL at 09:20

## 2022-07-29 RX ADMIN — HEPARIN SODIUM 5000 UNITS: 5000 INJECTION INTRAVENOUS; SUBCUTANEOUS at 05:18

## 2022-07-29 RX ADMIN — VANCOMYCIN HYDROCHLORIDE 1000 MG: 1 INJECTION, SOLUTION INTRAVENOUS at 14:26

## 2022-07-29 RX ADMIN — LEVOTHYROXINE SODIUM 50 MCG: 50 TABLET ORAL at 05:18

## 2022-07-29 RX ADMIN — Medication 10 ML: at 09:22

## 2022-07-29 RX ADMIN — INSULIN LISPRO 4 UNITS: 100 INJECTION, SOLUTION INTRAVENOUS; SUBCUTANEOUS at 12:03

## 2022-07-29 RX ADMIN — Medication 5000 UNITS: at 09:20

## 2022-07-29 RX ADMIN — INSULIN DETEMIR 20 UNITS: 100 INJECTION, SOLUTION SUBCUTANEOUS at 21:29

## 2022-07-29 RX ADMIN — INSULIN LISPRO 2 UNITS: 100 INJECTION, SOLUTION INTRAVENOUS; SUBCUTANEOUS at 21:28

## 2022-07-29 RX ADMIN — HEPARIN SODIUM 5000 UNITS: 5000 INJECTION INTRAVENOUS; SUBCUTANEOUS at 21:29

## 2022-07-29 RX ADMIN — TAZOBACTAM SODIUM AND PIPERACILLIN SODIUM 3.38 G: 375; 3 INJECTION, SOLUTION INTRAVENOUS at 15:58

## 2022-07-29 RX ADMIN — ASPIRIN 81 MG CHEWABLE TABLET 81 MG: 81 TABLET CHEWABLE at 09:20

## 2022-07-29 RX ADMIN — HYDROCODONE BITARTRATE AND ACETAMINOPHEN 1 TABLET: 5; 325 TABLET ORAL at 09:35

## 2022-07-29 RX ADMIN — HEPARIN SODIUM 5000 UNITS: 5000 INJECTION INTRAVENOUS; SUBCUTANEOUS at 14:26

## 2022-07-29 RX ADMIN — HYDROCODONE BITARTRATE AND ACETAMINOPHEN 1 TABLET: 5; 325 TABLET ORAL at 15:58

## 2022-07-29 RX ADMIN — TAZOBACTAM SODIUM AND PIPERACILLIN SODIUM 3.38 G: 375; 3 INJECTION, SOLUTION INTRAVENOUS at 09:19

## 2022-07-29 RX ADMIN — AMLODIPINE BESYLATE 5 MG: 5 TABLET ORAL at 09:20

## 2022-07-29 RX ADMIN — Medication 10 ML: at 21:29

## 2022-07-29 RX ADMIN — INSULIN LISPRO 2 UNITS: 100 INJECTION, SOLUTION INTRAVENOUS; SUBCUTANEOUS at 17:27

## 2022-07-29 RX ADMIN — INSULIN LISPRO 2 UNITS: 100 INJECTION, SOLUTION INTRAVENOUS; SUBCUTANEOUS at 09:19

## 2022-07-29 RX ADMIN — METOPROLOL SUCCINATE 50 MG: 50 TABLET, EXTENDED RELEASE ORAL at 09:20

## 2022-07-30 LAB
ANION GAP SERPL CALCULATED.3IONS-SCNC: 6 MMOL/L (ref 5–15)
BUN SERPL-MCNC: 15 MG/DL (ref 8–23)
BUN/CREAT SERPL: 13.6 (ref 7–25)
CALCIUM SPEC-SCNC: 8.6 MG/DL (ref 8.6–10.5)
CHLORIDE SERPL-SCNC: 104 MMOL/L (ref 98–107)
CO2 SERPL-SCNC: 28 MMOL/L (ref 22–29)
CREAT SERPL-MCNC: 1.1 MG/DL (ref 0.76–1.27)
EGFRCR SERPLBLD CKD-EPI 2021: 69.1 ML/MIN/1.73
GLUCOSE BLDC GLUCOMTR-MCNC: 148 MG/DL (ref 70–130)
GLUCOSE BLDC GLUCOMTR-MCNC: 193 MG/DL (ref 70–130)
GLUCOSE BLDC GLUCOMTR-MCNC: 214 MG/DL (ref 70–130)
GLUCOSE BLDC GLUCOMTR-MCNC: 222 MG/DL (ref 70–130)
GLUCOSE SERPL-MCNC: 220 MG/DL (ref 65–99)
POTASSIUM SERPL-SCNC: 4 MMOL/L (ref 3.5–5.2)
SODIUM SERPL-SCNC: 138 MMOL/L (ref 136–145)

## 2022-07-30 PROCEDURE — 99232 SBSQ HOSP IP/OBS MODERATE 35: CPT | Performed by: NURSE PRACTITIONER

## 2022-07-30 PROCEDURE — 25010000002 PIPERACILLIN SOD-TAZOBACTAM PER 1 G: Performed by: NURSE PRACTITIONER

## 2022-07-30 PROCEDURE — 25010000002 HEPARIN (PORCINE) PER 1000 UNITS: Performed by: NURSE PRACTITIONER

## 2022-07-30 PROCEDURE — 63710000001 INSULIN DETEMIR PER 5 UNITS: Performed by: STUDENT IN AN ORGANIZED HEALTH CARE EDUCATION/TRAINING PROGRAM

## 2022-07-30 PROCEDURE — 63710000001 INSULIN LISPRO (HUMAN) PER 5 UNITS: Performed by: NURSE PRACTITIONER

## 2022-07-30 PROCEDURE — 25010000002 VANCOMYCIN PER 500 MG

## 2022-07-30 PROCEDURE — 80048 BASIC METABOLIC PNL TOTAL CA: CPT

## 2022-07-30 PROCEDURE — 99231 SBSQ HOSP IP/OBS SF/LOW 25: CPT | Performed by: THORACIC SURGERY (CARDIOTHORACIC VASCULAR SURGERY)

## 2022-07-30 PROCEDURE — 82962 GLUCOSE BLOOD TEST: CPT

## 2022-07-30 PROCEDURE — 97530 THERAPEUTIC ACTIVITIES: CPT

## 2022-07-30 RX ORDER — AMLODIPINE BESYLATE 10 MG/1
10 TABLET ORAL DAILY
Status: DISCONTINUED | OUTPATIENT
Start: 2022-07-31 | End: 2022-08-26 | Stop reason: HOSPADM

## 2022-07-30 RX ORDER — INSULIN LISPRO 100 [IU]/ML
3 INJECTION, SOLUTION INTRAVENOUS; SUBCUTANEOUS
Status: DISCONTINUED | OUTPATIENT
Start: 2022-07-30 | End: 2022-08-03

## 2022-07-30 RX ORDER — AMLODIPINE BESYLATE 5 MG/1
5 TABLET ORAL ONCE
Status: COMPLETED | OUTPATIENT
Start: 2022-07-30 | End: 2022-07-30

## 2022-07-30 RX ADMIN — INSULIN LISPRO 3 UNITS: 100 INJECTION, SOLUTION INTRAVENOUS; SUBCUTANEOUS at 17:28

## 2022-07-30 RX ADMIN — Medication 5000 UNITS: at 09:21

## 2022-07-30 RX ADMIN — HEPARIN SODIUM 5000 UNITS: 5000 INJECTION INTRAVENOUS; SUBCUTANEOUS at 14:18

## 2022-07-30 RX ADMIN — METOPROLOL SUCCINATE 50 MG: 50 TABLET, EXTENDED RELEASE ORAL at 09:21

## 2022-07-30 RX ADMIN — VANCOMYCIN HYDROCHLORIDE 1000 MG: 1 INJECTION, SOLUTION INTRAVENOUS at 05:17

## 2022-07-30 RX ADMIN — TAZOBACTAM SODIUM AND PIPERACILLIN SODIUM 3.38 G: 375; 3 INJECTION, SOLUTION INTRAVENOUS at 00:30

## 2022-07-30 RX ADMIN — AMLODIPINE BESYLATE 5 MG: 5 TABLET ORAL at 12:23

## 2022-07-30 RX ADMIN — INSULIN LISPRO 4 UNITS: 100 INJECTION, SOLUTION INTRAVENOUS; SUBCUTANEOUS at 09:21

## 2022-07-30 RX ADMIN — INSULIN LISPRO 2 UNITS: 100 INJECTION, SOLUTION INTRAVENOUS; SUBCUTANEOUS at 21:17

## 2022-07-30 RX ADMIN — TAZOBACTAM SODIUM AND PIPERACILLIN SODIUM 3.38 G: 375; 3 INJECTION, SOLUTION INTRAVENOUS at 17:28

## 2022-07-30 RX ADMIN — INSULIN LISPRO 4 UNITS: 100 INJECTION, SOLUTION INTRAVENOUS; SUBCUTANEOUS at 11:55

## 2022-07-30 RX ADMIN — ASPIRIN 81 MG CHEWABLE TABLET 81 MG: 81 TABLET CHEWABLE at 09:21

## 2022-07-30 RX ADMIN — INSULIN LISPRO 3 UNITS: 100 INJECTION, SOLUTION INTRAVENOUS; SUBCUTANEOUS at 11:55

## 2022-07-30 RX ADMIN — TAZOBACTAM SODIUM AND PIPERACILLIN SODIUM 3.38 G: 375; 3 INJECTION, SOLUTION INTRAVENOUS at 09:21

## 2022-07-30 RX ADMIN — HYDROCODONE BITARTRATE AND ACETAMINOPHEN 1 TABLET: 5; 325 TABLET ORAL at 05:25

## 2022-07-30 RX ADMIN — CETIRIZINE HYDROCHLORIDE 5 MG: 10 TABLET, FILM COATED ORAL at 09:21

## 2022-07-30 RX ADMIN — LEVOTHYROXINE SODIUM 50 MCG: 50 TABLET ORAL at 05:17

## 2022-07-30 RX ADMIN — HEPARIN SODIUM 5000 UNITS: 5000 INJECTION INTRAVENOUS; SUBCUTANEOUS at 21:17

## 2022-07-30 RX ADMIN — Medication 10 ML: at 21:17

## 2022-07-30 RX ADMIN — HEPARIN SODIUM 5000 UNITS: 5000 INJECTION INTRAVENOUS; SUBCUTANEOUS at 05:17

## 2022-07-30 RX ADMIN — AMLODIPINE BESYLATE 5 MG: 5 TABLET ORAL at 09:21

## 2022-07-30 RX ADMIN — INSULIN DETEMIR 20 UNITS: 100 INJECTION, SOLUTION SUBCUTANEOUS at 21:16

## 2022-07-31 LAB
ANION GAP SERPL CALCULATED.3IONS-SCNC: 8 MMOL/L (ref 5–15)
BUN SERPL-MCNC: 15 MG/DL (ref 8–23)
BUN/CREAT SERPL: 12.7 (ref 7–25)
CALCIUM SPEC-SCNC: 8.7 MG/DL (ref 8.6–10.5)
CHLORIDE SERPL-SCNC: 103 MMOL/L (ref 98–107)
CO2 SERPL-SCNC: 26 MMOL/L (ref 22–29)
CREAT SERPL-MCNC: 1.18 MG/DL (ref 0.76–1.27)
EGFRCR SERPLBLD CKD-EPI 2021: 63.6 ML/MIN/1.73
GLUCOSE BLDC GLUCOMTR-MCNC: 159 MG/DL (ref 70–130)
GLUCOSE BLDC GLUCOMTR-MCNC: 207 MG/DL (ref 70–130)
GLUCOSE BLDC GLUCOMTR-MCNC: 242 MG/DL (ref 70–130)
GLUCOSE BLDC GLUCOMTR-MCNC: 272 MG/DL (ref 70–130)
GLUCOSE SERPL-MCNC: 233 MG/DL (ref 65–99)
POTASSIUM SERPL-SCNC: 4 MMOL/L (ref 3.5–5.2)
SODIUM SERPL-SCNC: 137 MMOL/L (ref 136–145)

## 2022-07-31 PROCEDURE — 63710000001 INSULIN DETEMIR PER 5 UNITS: Performed by: NURSE PRACTITIONER

## 2022-07-31 PROCEDURE — 80048 BASIC METABOLIC PNL TOTAL CA: CPT | Performed by: INTERNAL MEDICINE

## 2022-07-31 PROCEDURE — 82962 GLUCOSE BLOOD TEST: CPT

## 2022-07-31 PROCEDURE — 63710000001 INSULIN LISPRO (HUMAN) PER 5 UNITS: Performed by: NURSE PRACTITIONER

## 2022-07-31 PROCEDURE — 25010000002 PIPERACILLIN SOD-TAZOBACTAM PER 1 G: Performed by: NURSE PRACTITIONER

## 2022-07-31 PROCEDURE — 99232 SBSQ HOSP IP/OBS MODERATE 35: CPT | Performed by: NURSE PRACTITIONER

## 2022-07-31 PROCEDURE — 25010000002 HEPARIN (PORCINE) PER 1000 UNITS: Performed by: NURSE PRACTITIONER

## 2022-07-31 PROCEDURE — 97535 SELF CARE MNGMENT TRAINING: CPT

## 2022-07-31 PROCEDURE — 97110 THERAPEUTIC EXERCISES: CPT

## 2022-07-31 PROCEDURE — 99231 SBSQ HOSP IP/OBS SF/LOW 25: CPT | Performed by: THORACIC SURGERY (CARDIOTHORACIC VASCULAR SURGERY)

## 2022-07-31 PROCEDURE — 25010000002 VANCOMYCIN PER 500 MG

## 2022-07-31 RX ADMIN — TAZOBACTAM SODIUM AND PIPERACILLIN SODIUM 3.38 G: 375; 3 INJECTION, SOLUTION INTRAVENOUS at 01:25

## 2022-07-31 RX ADMIN — TAZOBACTAM SODIUM AND PIPERACILLIN SODIUM 3.38 G: 375; 3 INJECTION, SOLUTION INTRAVENOUS at 23:45

## 2022-07-31 RX ADMIN — ASPIRIN 81 MG CHEWABLE TABLET 81 MG: 81 TABLET CHEWABLE at 08:06

## 2022-07-31 RX ADMIN — TAZOBACTAM SODIUM AND PIPERACILLIN SODIUM 3.38 G: 375; 3 INJECTION, SOLUTION INTRAVENOUS at 08:06

## 2022-07-31 RX ADMIN — INSULIN LISPRO 3 UNITS: 100 INJECTION, SOLUTION INTRAVENOUS; SUBCUTANEOUS at 08:09

## 2022-07-31 RX ADMIN — HYDROCODONE BITARTRATE AND ACETAMINOPHEN 1 TABLET: 5; 325 TABLET ORAL at 12:52

## 2022-07-31 RX ADMIN — INSULIN LISPRO 2 UNITS: 100 INJECTION, SOLUTION INTRAVENOUS; SUBCUTANEOUS at 12:23

## 2022-07-31 RX ADMIN — INSULIN LISPRO 6 UNITS: 100 INJECTION, SOLUTION INTRAVENOUS; SUBCUTANEOUS at 21:22

## 2022-07-31 RX ADMIN — LEVOTHYROXINE SODIUM 50 MCG: 50 TABLET ORAL at 05:38

## 2022-07-31 RX ADMIN — VANCOMYCIN HYDROCHLORIDE 1000 MG: 1 INJECTION, SOLUTION INTRAVENOUS at 17:06

## 2022-07-31 RX ADMIN — INSULIN DETEMIR 25 UNITS: 100 INJECTION, SOLUTION SUBCUTANEOUS at 21:22

## 2022-07-31 RX ADMIN — HEPARIN SODIUM 5000 UNITS: 5000 INJECTION INTRAVENOUS; SUBCUTANEOUS at 14:11

## 2022-07-31 RX ADMIN — CETIRIZINE HYDROCHLORIDE 5 MG: 10 TABLET, FILM COATED ORAL at 08:06

## 2022-07-31 RX ADMIN — HEPARIN SODIUM 5000 UNITS: 5000 INJECTION INTRAVENOUS; SUBCUTANEOUS at 05:38

## 2022-07-31 RX ADMIN — TAZOBACTAM SODIUM AND PIPERACILLIN SODIUM 3.38 G: 375; 3 INJECTION, SOLUTION INTRAVENOUS at 16:13

## 2022-07-31 RX ADMIN — HYDROCODONE BITARTRATE AND ACETAMINOPHEN 1 TABLET: 5; 325 TABLET ORAL at 05:46

## 2022-07-31 RX ADMIN — AMLODIPINE BESYLATE 10 MG: 10 TABLET ORAL at 08:06

## 2022-07-31 RX ADMIN — VANCOMYCIN HYDROCHLORIDE 1000 MG: 1 INJECTION, SOLUTION INTRAVENOUS at 00:05

## 2022-07-31 RX ADMIN — Medication 10 ML: at 08:17

## 2022-07-31 RX ADMIN — INSULIN LISPRO 3 UNITS: 100 INJECTION, SOLUTION INTRAVENOUS; SUBCUTANEOUS at 17:06

## 2022-07-31 RX ADMIN — METOPROLOL SUCCINATE 50 MG: 50 TABLET, EXTENDED RELEASE ORAL at 08:06

## 2022-07-31 RX ADMIN — Medication 5000 UNITS: at 08:06

## 2022-07-31 RX ADMIN — HEPARIN SODIUM 5000 UNITS: 5000 INJECTION INTRAVENOUS; SUBCUTANEOUS at 21:22

## 2022-07-31 RX ADMIN — INSULIN LISPRO 4 UNITS: 100 INJECTION, SOLUTION INTRAVENOUS; SUBCUTANEOUS at 17:06

## 2022-07-31 RX ADMIN — INSULIN LISPRO 4 UNITS: 100 INJECTION, SOLUTION INTRAVENOUS; SUBCUTANEOUS at 08:06

## 2022-07-31 RX ADMIN — INSULIN LISPRO 3 UNITS: 100 INJECTION, SOLUTION INTRAVENOUS; SUBCUTANEOUS at 12:23

## 2022-08-01 ENCOUNTER — ANESTHESIA EVENT (OUTPATIENT)
Dept: PERIOP | Facility: HOSPITAL | Age: 77
End: 2022-08-01

## 2022-08-01 LAB
BACTERIA SPEC AEROBE CULT: NORMAL
BACTERIA SPEC AEROBE CULT: NORMAL
BH CV GRAFT BRACHIAL PRESSURE RIGHT: 148 MMHG
BH CV LEA LEFT ANT TIBIAL A DISTAL EDV: 6.1 CM/S
BH CV LEA LEFT ANT TIBIAL A DISTAL PSV: 19.1 CM/S
BH CV LEA LEFT ANT TIBIAL A MID EDV: 5.5 CM/S
BH CV LEA LEFT ANT TIBIAL A MID PSV: 17.1 CM/S
BH CV LEA LEFT ANT TIBIAL A PROX EDV: 7.1 CM/S
BH CV LEA LEFT ANT TIBIAL A PROX PSV: 44 CM/S
BH CV LEA LEFT CFA DISTAL EDV: 0 CM/S
BH CV LEA LEFT CFA DISTAL PSV: 105 CM/S
BH CV LEA LEFT DFA PROX EDV: 0 CM/S
BH CV LEA LEFT DFA PROX PSV: 55.4 CM/S
BH CV LEA LEFT DPA PRESSURE: 130 MMHG
BH CV LEA LEFT PERONEAL  MID EDV: 25.1 CM/S
BH CV LEA LEFT PERONEAL  MID PSV: 85.5 CM/S
BH CV LEA LEFT POPITEAL A  DISTAL EDV: 9.1 CM/S
BH CV LEA LEFT POPITEAL A  DISTAL PSV: 155.7 CM/S
BH CV LEA LEFT POPITEAL A  PROX EDV: 6.6 CM/S
BH CV LEA LEFT POPITEAL A  PROX PSV: 81.6 CM/S
BH CV LEA LEFT PTA DISTAL EDV: 21.6 CM/S
BH CV LEA LEFT PTA DISTAL PSV: 46.7 CM/S
BH CV LEA LEFT PTA MID EDV: 5.3 CM/S
BH CV LEA LEFT PTA MID PSV: 14.3 CM/S
BH CV LEA LEFT PTA PRESSURE: 130 MMHG
BH CV LEA LEFT SFA DISTAL EDV: -7.2 CM/S
BH CV LEA LEFT SFA DISTAL PSV: -81.1 CM/S
BH CV LEA LEFT SFA MID EDV: -11 CM/S
BH CV LEA LEFT SFA MID PSV: -94.3 CM/S
BH CV LEA LEFT SFA PROX EDV: 9.4 CM/S
BH CV LEA LEFT SFA PROX PSV: 80 CM/S
BH CV LEA RIGHT DPA PRESSURE: 142 MMHG
BH CV LEA RIGHT PTA PRESSURE: 52 MMHG
BH CV LOWER ARTERIAL LEFT ABI RATIO: 0.87
BH CV LOWER ARTERIAL RIGHT ABI RATIO: 0.95
DEPRECATED RDW RBC AUTO: 39.8 FL (ref 37–54)
ERYTHROCYTE [DISTWIDTH] IN BLOOD BY AUTOMATED COUNT: 12.5 % (ref 12.3–15.4)
GLUCOSE BLDC GLUCOMTR-MCNC: 156 MG/DL (ref 70–130)
GLUCOSE BLDC GLUCOMTR-MCNC: 176 MG/DL (ref 70–130)
GLUCOSE BLDC GLUCOMTR-MCNC: 180 MG/DL (ref 70–130)
GLUCOSE BLDC GLUCOMTR-MCNC: 256 MG/DL (ref 70–130)
HCT VFR BLD AUTO: 40 % (ref 37.5–51)
HGB BLD-MCNC: 13.4 G/DL (ref 13–17.7)
MAXIMAL PREDICTED HEART RATE: 143 BPM
MCH RBC QN AUTO: 29.2 PG (ref 26.6–33)
MCHC RBC AUTO-ENTMCNC: 33.5 G/DL (ref 31.5–35.7)
MCV RBC AUTO: 87.1 FL (ref 79–97)
PLATELET # BLD AUTO: 264 10*3/MM3 (ref 140–450)
PMV BLD AUTO: 10.9 FL (ref 6–12)
POTASSIUM SERPL-SCNC: 4.4 MMOL/L (ref 3.5–5.2)
RBC # BLD AUTO: 4.59 10*6/MM3 (ref 4.14–5.8)
STRESS TARGET HR: 122 BPM
VANCOMYCIN SERPL-MCNC: 12.8 MCG/ML (ref 5–40)
WBC NRBC COR # BLD: 7.81 10*3/MM3 (ref 3.4–10.8)

## 2022-08-01 PROCEDURE — 63710000001 INSULIN LISPRO (HUMAN) PER 5 UNITS: Performed by: NURSE PRACTITIONER

## 2022-08-01 PROCEDURE — 97530 THERAPEUTIC ACTIVITIES: CPT

## 2022-08-01 PROCEDURE — 25010000002 VANCOMYCIN PER 500 MG

## 2022-08-01 PROCEDURE — 82962 GLUCOSE BLOOD TEST: CPT

## 2022-08-01 PROCEDURE — 25010000002 PIPERACILLIN SOD-TAZOBACTAM PER 1 G: Performed by: NURSE PRACTITIONER

## 2022-08-01 PROCEDURE — 63710000001 INSULIN DETEMIR PER 5 UNITS: Performed by: NURSE PRACTITIONER

## 2022-08-01 PROCEDURE — 80202 ASSAY OF VANCOMYCIN: CPT

## 2022-08-01 PROCEDURE — 85027 COMPLETE CBC AUTOMATED: CPT | Performed by: INTERNAL MEDICINE

## 2022-08-01 PROCEDURE — 97110 THERAPEUTIC EXERCISES: CPT

## 2022-08-01 PROCEDURE — 99024 POSTOP FOLLOW-UP VISIT: CPT | Performed by: THORACIC SURGERY (CARDIOTHORACIC VASCULAR SURGERY)

## 2022-08-01 PROCEDURE — 99232 SBSQ HOSP IP/OBS MODERATE 35: CPT | Performed by: INTERNAL MEDICINE

## 2022-08-01 PROCEDURE — 97116 GAIT TRAINING THERAPY: CPT

## 2022-08-01 PROCEDURE — 84132 ASSAY OF SERUM POTASSIUM: CPT | Performed by: ANESTHESIOLOGY

## 2022-08-01 PROCEDURE — 25010000002 HEPARIN (PORCINE) PER 1000 UNITS: Performed by: NURSE PRACTITIONER

## 2022-08-01 RX ORDER — CARVEDILOL 6.25 MG/1
6.25 TABLET ORAL EVERY 12 HOURS SCHEDULED
Status: DISCONTINUED | OUTPATIENT
Start: 2022-08-01 | End: 2022-08-01

## 2022-08-01 RX ORDER — FAMOTIDINE 10 MG/ML
20 INJECTION, SOLUTION INTRAVENOUS ONCE
Status: CANCELLED | OUTPATIENT
Start: 2022-08-01 | End: 2022-08-01

## 2022-08-01 RX ORDER — METOPROLOL SUCCINATE 50 MG/1
50 TABLET, EXTENDED RELEASE ORAL
Status: DISCONTINUED | OUTPATIENT
Start: 2022-08-01 | End: 2022-08-26 | Stop reason: HOSPADM

## 2022-08-01 RX ORDER — LISINOPRIL 5 MG/1
5 TABLET ORAL
Status: DISCONTINUED | OUTPATIENT
Start: 2022-08-01 | End: 2022-08-03

## 2022-08-01 RX ADMIN — LISINOPRIL 5 MG: 5 TABLET ORAL at 10:39

## 2022-08-01 RX ADMIN — VANCOMYCIN HYDROCHLORIDE 750 MG: 750 INJECTION, SOLUTION INTRAVENOUS at 21:20

## 2022-08-01 RX ADMIN — LEVOTHYROXINE SODIUM 50 MCG: 50 TABLET ORAL at 05:36

## 2022-08-01 RX ADMIN — HEPARIN SODIUM 5000 UNITS: 5000 INJECTION INTRAVENOUS; SUBCUTANEOUS at 21:20

## 2022-08-01 RX ADMIN — METOPROLOL SUCCINATE 50 MG: 50 TABLET, EXTENDED RELEASE ORAL at 10:39

## 2022-08-01 RX ADMIN — ASPIRIN 81 MG CHEWABLE TABLET 81 MG: 81 TABLET CHEWABLE at 08:21

## 2022-08-01 RX ADMIN — Medication 10 ML: at 08:22

## 2022-08-01 RX ADMIN — VANCOMYCIN HYDROCHLORIDE 750 MG: 750 INJECTION, SOLUTION INTRAVENOUS at 13:22

## 2022-08-01 RX ADMIN — INSULIN LISPRO 2 UNITS: 100 INJECTION, SOLUTION INTRAVENOUS; SUBCUTANEOUS at 21:20

## 2022-08-01 RX ADMIN — HYDROCODONE BITARTRATE AND ACETAMINOPHEN 1 TABLET: 5; 325 TABLET ORAL at 16:34

## 2022-08-01 RX ADMIN — CETIRIZINE HYDROCHLORIDE 5 MG: 10 TABLET, FILM COATED ORAL at 08:21

## 2022-08-01 RX ADMIN — INSULIN DETEMIR 25 UNITS: 100 INJECTION, SOLUTION SUBCUTANEOUS at 21:19

## 2022-08-01 RX ADMIN — INSULIN LISPRO 3 UNITS: 100 INJECTION, SOLUTION INTRAVENOUS; SUBCUTANEOUS at 08:21

## 2022-08-01 RX ADMIN — INSULIN LISPRO 2 UNITS: 100 INJECTION, SOLUTION INTRAVENOUS; SUBCUTANEOUS at 08:20

## 2022-08-01 RX ADMIN — METOPROLOL SUCCINATE 50 MG: 50 TABLET, EXTENDED RELEASE ORAL at 08:21

## 2022-08-01 RX ADMIN — AMLODIPINE BESYLATE 10 MG: 10 TABLET ORAL at 08:21

## 2022-08-01 RX ADMIN — HEPARIN SODIUM 5000 UNITS: 5000 INJECTION INTRAVENOUS; SUBCUTANEOUS at 14:22

## 2022-08-01 RX ADMIN — INSULIN LISPRO 2 UNITS: 100 INJECTION, SOLUTION INTRAVENOUS; SUBCUTANEOUS at 11:54

## 2022-08-01 RX ADMIN — Medication 5000 UNITS: at 08:21

## 2022-08-01 RX ADMIN — HEPARIN SODIUM 5000 UNITS: 5000 INJECTION INTRAVENOUS; SUBCUTANEOUS at 05:35

## 2022-08-01 RX ADMIN — INSULIN LISPRO 3 UNITS: 100 INJECTION, SOLUTION INTRAVENOUS; SUBCUTANEOUS at 11:54

## 2022-08-01 RX ADMIN — TAZOBACTAM SODIUM AND PIPERACILLIN SODIUM 3.38 G: 375; 3 INJECTION, SOLUTION INTRAVENOUS at 08:20

## 2022-08-01 RX ADMIN — TAZOBACTAM SODIUM AND PIPERACILLIN SODIUM 3.38 G: 375; 3 INJECTION, SOLUTION INTRAVENOUS at 23:03

## 2022-08-01 RX ADMIN — INSULIN LISPRO 4 UNITS: 100 INJECTION, SOLUTION INTRAVENOUS; SUBCUTANEOUS at 17:01

## 2022-08-01 RX ADMIN — TAZOBACTAM SODIUM AND PIPERACILLIN SODIUM 3.38 G: 375; 3 INJECTION, SOLUTION INTRAVENOUS at 16:30

## 2022-08-01 RX ADMIN — INSULIN LISPRO 3 UNITS: 100 INJECTION, SOLUTION INTRAVENOUS; SUBCUTANEOUS at 17:01

## 2022-08-01 RX ADMIN — Medication 10 ML: at 21:22

## 2022-08-01 NOTE — THERAPY TREATMENT NOTE
Patient Name: Jermaine Singh  : 1945    MRN: [id number]                              Today's Date: 2022       Admit Date: 2022    Visit Dx:     ICD-10-CM ICD-9-CM   1. Dislocation of left shoulder joint, initial encounter  S43.005A 831.00   2. Cellulitis of left foot  L03.116 682.7   3. 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  Preserved LV function   • Type 2 diabetes mellitus ([facility])   • Hypertension   • Hyperlipidemia   • Hypothyroidism (acquired)   • Vitamin D deficiency on Rx    • Serum Cr 1.32 on admission.  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 Au   • Dizziness   • Atherosclerosis of native artery of both lower extremities with intermittent claudication ([facility])   • SSS (sick sinus syndrome) ([facility])   • Dislocation of left shoulder joint, initial encounter   • Cellulitis in diabetic foot ([facility])   • Fall   • Sepsis ([facility])   • Leukocytosis   • Lactic acidosis   • Elevated C-reactive protein (CRP)   • Elevated sed rate (elev SR)   • Proximal phalanx fracture of the second digit extending into the second metatarsal joint     Past Medical History:   Diagnosis Date   • Asthma    • Coronary artery disease    • Diabetes mellitus ([facility])     started on inuslin 2018; started on po meds in ; checking blood sugars daily    • Disease of thyroid gland     po meds daily for hypothyroidism    • History of fracture as a child     rt leg- severe    • Hyperlipidemia    • Hypertension    • Hypothyroidism    • Peripheral neuropathy    • Peripheral vascular disease ([facility])     s/p angiogram -needs stent in left leg    • RBBB    • Right knee pain    • Vitamin D deficiency      Past Surgical History:   Procedure Laterality Date   • APPENDECTOMY     • CARDIAC CATHETERIZATION N/A 2019    Procedure: Left Heart Cath;  Surgeon: Cooper Apodaca  "MD ALEXY;  Location:  [facility] INVASIVE LOCATION;  Service: Cardiology   • CARDIAC ELECTROPHYSIOLOGY PROCEDURE N/A 5/18/2022    Procedure: DEVICE IMPLANT;  Surgeon: Cooper Apodaca MD;  Location:  [facility] INVASIVE LOCATION;  Service: Cardiology;  Laterality: N/A;   • COLONOSCOPY     • CORONARY ARTERY BYPASS GRAFT N/A 3/22/2019    Procedure: MEDIAN STERNOTOMY, CORONARY ARTERY BYPASS GRAFT X3, UTILIZING THE LEFT INTERNAL MAMMARY ARTERY, EVH AND OPEN HARVEST OF THE RIGHT GREATER SAPHENOUS VEIN, EXPLORATION OF THE LEFT LEG;  Surgeon: Ap Au MD;  Location:  [facility];  Service: Cardiothoracic   • EYE SURGERY Bilateral     cataracts    • INTERVENTIONAL RADIOLOGY PROCEDURE N/A 7/29/2021    Procedure: Abdominal Aortagram with Runoff;  Surgeon: Jermaine Hernandez MD;  Location:  [facility] INVASIVE LOCATION;  Service: Cardiovascular;  Laterality: N/A;   • KNEE ARTHROSCOPY      right x 2, left x 1   • LACERATION REPAIR      right leg   • LEG SURGERY      2 for fracture of rt leg    • TONSILLECTOMY      Adnoidectomy      General Information     Row Name 08/01/22 1102          OT Time and Intention    Document Type therapy note (daily note)  -MR     Mode of Treatment occupational therapy;individual therapy  -MR     Row Name 08/01/22 1102          General Information    Patient Profile Reviewed yes  -MR     Existing Precautions/Restrictions fall;pacemaker  Pt admitted s/p mechanical fall with L shoulder dislocation reduced in ED, sling for comfort, per ortho consult pt cleared for LUE ROM \"avoiding ABDuction and ER.\" PMH: L great toe wound/cellulitis with Darco shoe for mobility  -MR     Barriers to Rehab medically complex;previous functional deficit;impaired sensation  -MR     Row Name 08/01/22 1102          Cognition    Orientation Status (Cognition) oriented x 4  -MR     Row Name 08/01/22 1102          Safety Issues, Functional Mobility    Safety Issues Affecting Function (Mobility) awareness of need for " assistance;insight into deficits/self-awareness;safety precaution awareness;safety precautions follow-through/compliance;sequencing abilities;judgment;problem-solving  -MR     Impairments Affecting Function (Mobility) balance;endurance/activity tolerance;pain;strength;sensation/sensory awareness;postural/trunk control  -MR     Cognitive Impairments, Mobility Safety/Performance attention;awareness, need for assistance;insight into deficits/self-awareness;judgment;problem-solving/reasoning;safety precaution awareness;safety precaution follow-through;sequencing abilities  -MR           User Key  (r) = Recorded By, (t) = Taken By, (c) = Cosigned By    Initials Name Provider Type     Rosalinda Silva, OT Occupational Therapist                 Mobility/ADL's     Row Name 08/01/22 1103          Bed Mobility    Comment, (Bed Mobility) Received UIC and left UIC  -MR     Row Name 08/01/22 1103          Activities of Daily Living    BADL Assessment/Intervention grooming;lower body dressing  -MR     Row Name 08/01/22 1103          Mobility    Extremity Weight-bearing Status left upper extremity  -MR     Left Upper Extremity (Weight-bearing Status) non weight-bearing (NWB)  Until otherwise clarified  -MR     Row Name 08/01/22 1103          Lower Body Dressing Assessment/Training    [place] Level (Lower Body Dressing) don;socks;dependent (less than 25% patient effort)  -MR     Position (Lower Body Dressing) supported sitting  -MR     Row Name 08/01/22 1103          Grooming Assessment/Training    [place] Level (Grooming) wash face, hands;set up  -MR     Position (Grooming) supported sitting  -MR           User Key  (r) = Recorded By, (t) = Taken By, (c) = Cosigned By    Initials Name Provider Type     Rosalinda Silva, OT Occupational Therapist               Obj/Interventions     Row Name 08/01/22 1106          Shoulder (Therapeutic Exercise)    Shoulder (Therapeutic Exercise) AROM (active range of motion)  -MR      Shoulder AROM (Therapeutic Exercise) bilateral;scapular retraction;sitting;10 repetitions  -MR     Row Name 08/01/22 1106          Elbow/Forearm (Therapeutic Exercise)    Elbow/Forearm (Therapeutic Exercise) AROM (active range of motion)  -MR     Elbow/Forearm AROM (Therapeutic Exercise) left;flexion;extension;supination;pronation;sitting;10 repetitions  -MR     Row Name 08/01/22 1106          Wrist (Therapeutic Exercise)    Wrist (Therapeutic Exercise) AROM (active range of motion)  -MR     Wrist AROM (Therapeutic Exercise) left;flexion;extension;10 repetitions  -MR     Row Name 08/01/22 1106          Hand (Therapeutic Exercise)    Hand (Therapeutic Exercise) AROM (active range of motion)  -MR     Hand AROM/AAROM (Therapeutic Exercise) left;AROM (active range of motion);finger flexion;finger extension;10 repetitions  -MR     Row Name 08/01/22 1106          Motor Skills    Therapeutic Exercise shoulder;elbow/forearm;wrist;hand;other (see comments)   Pt cleared for LUE ROM avoiding ABDuction and ER.  -MR     Row Name 08/01/22 1106          Balance    Balance Assessment sitting static balance;sitting dynamic balance  -     Static Sitting Balance independent  -     Dynamic Sitting Balance supervision  -MR     Position, Sitting Balance supported;unsupported;sitting in chair  -           User Key  (r) = Recorded By, (t) = Taken By, (c) = Cosigned By    Initials Name Provider Type    Rosalinda Domingo OT Occupational Therapist               Goals/Plan    No documentation.                Clinical Impression     Row Name 08/01/22 1107          Pain Assessment    Pretreatment Pain Rating 2/10  -MR     Posttreatment Pain Rating 2/10  -MR     Pain Location - Side/Orientation Left  -MR     Pain Location anterior  -MR     Pain Location - shoulder  -MR     Pre/Posttreatment Pain Comment Pt reporting soreness in the LUE  -MR     Pain Intervention(s) Ambulation/increased activity;Repositioned  -     Row Name 08/01/22 1107           Plan of Care Review    Plan of Care Reviewed With patient  -MR     Progress no change  -MR     Outcome Evaluation Pt re-educated L shoulder precautions, sling wear for comfort and donna-dressing the LUE. Pt tolerated LUE ther ex w/in MD parameters (no ABDuction and ER). SUA for grooming this date. Continue to progress as able per current POC.  -     Row Name 08/01/22 1107          Therapy Plan Review/Discharge Plan (OT)    Anticipated Discharge Disposition (OT) home with assist  -     Row Name 08/01/22 1107          Vital Signs    Pre Systolic BP Rehab --  VSS. Cleared by RN for treatment  -MR     O2 Delivery Pre Treatment room air  -MR     O2 Delivery Intra Treatment room air  -MR     O2 Delivery Post Treatment room air  -MR     Pre Patient Position Sitting  -MR     Intra Patient Position Sitting  -MR     Post Patient Position Sitting  -MR     Row Name 08/01/22 1107          Positioning and Restraints    Pre-Treatment Position sitting in chair/recliner  -MR     Post Treatment Position chair  -MR     In Chair notified nsg;reclined;sitting;call light within reach;encouraged to call for assist;exit alarm on;legs elevated;with other staff;waffle cushion;heels elevated  -MR           User Key  (r) = Recorded By, (t) = Taken By, (c) = Cosigned By    Initials Name Provider Type    MR Rosalinda Silva, OT Occupational Therapist               Outcome Measures     Row Name 08/01/22 1111          How much help from another is currently needed...    Putting on and taking off regular lower body clothing? 2  -MR     Bathing (including washing, rinsing, and drying) 3  -MR     Toileting (which includes using toilet bed pan or urinal) 3  -MR     Putting on and taking off regular upper body clothing 3  -MR     Taking care of personal grooming (such as brushing teeth) 3  -MR     Eating meals 4  -MR     AM-PAC 6 Clicks Score (OT) 18  -MR     Row Name 08/01/22 0820          How much help from another person do you currently  need...    Turning from your back to your side while in flat bed without using bedrails? 4  -TS     Moving from lying on back to sitting on the side of a flat bed without bedrails? 3  -TS     Moving to and from a bed to a chair (including a wheelchair)? 3  -TS     Standing up from a chair using your arms (e.g., wheelchair, bedside chair)? 3  -TS     Climbing 3-5 steps with a railing? 2  -TS     To walk in hospital room? 3  -TS     AM-PAC 6 Clicks Score (PT) 18  -TS     Highest level of mobility 6 --> Walked 10 steps or more  -TS     Row Name 08/01/22 1111          Functional Assessment    Outcome Measure Options AM-PAC 6 Clicks Daily Activity (OT)  -MR           User Key  (r) = Recorded By, (t) = Taken By, (c) = Cosigned By    Initials Name Provider Type    TS Kiersten Plummer, RN Registered Nurse    Rosalinda Domingo, OT Occupational Therapist                Occupational Therapy Education                 Title: PT OT SLP Therapies (Done)     Topic: Occupational Therapy (Done)     Point: ADL training (Done)     Description:   Instruct learner(s) on proper safety adaptation and remediation techniques during self care or transfers.   Instruct in proper use of assistive devices.              Learning Progress Summary           Patient Acceptance, E, VU by MR at 8/1/2022 1112    Acceptance, E,D, VU,DU,NR by TB at 7/31/2022 1545    Acceptance, E,D, VU,NR by TB at 7/30/2022 1552    Acceptance, E,D, VU,DU,NR by TB at 7/29/2022 1625                   Point: Home exercise program (Done)     Description:   Instruct learner(s) on appropriate technique for monitoring, assisting and/or progressing therapeutic exercises/activities.              Learning Progress Summary           Patient Acceptance, E, VU by MR at 8/1/2022 1112    Acceptance, E,D, VU,DU,NR by TB at 7/31/2022 1545    Acceptance, E,D, VU,NR by TB at 7/30/2022 1552    Acceptance, E,D, VU,DU,NR by TB at 7/29/2022 1625                   Point: Precautions (Done)      Description:   Instruct learner(s) on prescribed precautions during self-care and functional transfers.              Learning Progress Summary           Patient Acceptance, E, VU by MR at 8/1/2022 1112    Acceptance, E,D, VU,DU,NR by TB at 7/31/2022 1545    Acceptance, E,D, VU,NR by TB at 7/30/2022 1552    Acceptance, E,D, VU,DU,NR by TB at 7/29/2022 1625                   Point: Body mechanics (Done)     Description:   Instruct learner(s) on proper positioning and spine alignment during self-care, functional mobility activities and/or exercises.              Learning Progress Summary           Patient Acceptance, E, VU by MR at 8/1/2022 1112                               User Key     Initials Effective Dates Name Provider Type Discipline     06/16/21 -  Hanane Castañeda OT Occupational Therapist OT     10/06/21 -  Rosalinda Silva OT Occupational Therapist OT              OT Recommendation and Plan     Plan of Care Review  Plan of Care Reviewed With: patient  Progress: no change  Outcome Evaluation: Pt re-educated L shoulder precautions, sling wear for comfort and donna-dressing the LUE. Pt tolerated LUE ther ex w/in MD parameters (no ABDuction and ER). SUA for grooming this date. Continue to progress as able per current POC.     Time Calculation:    Time Calculation- OT     Row Name 08/01/22 1112             Time Calculation- OT    OT Start Time 0901  -MR      OT Received On 08/01/22  -MR      OT Goal Re-Cert Due Date 08/08/22  -MR              Timed Charges    25376 - OT Therapeutic Exercise Minutes 9  -MR      75285 - OT Self Care/Mgmt Minutes 5  -MR              Total Minutes    Timed Charges Total Minutes 14  -MR       Total Minutes 14  -MR            User Key  (r) = Recorded By, (t) = Taken By, (c) = Cosigned By    Initials Name Provider Type    MR Rosalinda Silva, OT Occupational Therapist              Therapy Charges for Today     Code Description Service Date Service Provider Modifiers Qty     [id number]  OT THER PROC EA 15 MIN 8/1/2022 Rajiv Silva, OT GO 1               RAJIV SILVA OT  8/1/2022

## 2022-08-01 NOTE — PLAN OF CARE
Goal Outcome Evaluation:              Outcome Evaluation: Pt performed STS and amb 360' with SPC and CGA. Activity limited by SOA and fatigue. Pt educated on NWB following surgery tomorrow and potential use of hemiwalker due to LUE unclear WB status. Continue with PT POC as pt tolerates.

## 2022-08-01 NOTE — CASE MANAGEMENT/SOCIAL WORK
Continued Stay Note  [facility]     Patient Name: Jermaine Singh  MRN: [id number]  Today's Date: 8/1/2022    Admit Date: 7/27/2022     Discharge Plan     Row Name 08/01/22 1606       Plan    Plan Home    Plan Comments Case mgt con't to follow. No intervention today, will follow progress and assist with any d/c needs.               Discharge Codes    No documentation.               Expected Discharge Date and Time     Expected Discharge Date Expected Discharge Time    Aug 1, 2022             Sonja C Kellerman, RN

## 2022-08-01 NOTE — PLAN OF CARE
Problem: Fall Injury Risk  Goal: Absence of Fall and Fall-Related Injury  Outcome: Ongoing, Not Progressing  Intervention: Identify and Manage Contributors  Recent Flowsheet Documentation  Taken 8/1/2022 0820 by Kiersten Plummer RN  Medication Review/Management: medications reviewed  Intervention: Promote Injury-Free Environment  Recent Flowsheet Documentation  Taken 8/1/2022 1830 by Kiersten Plummer, RN  Safety Promotion/Fall Prevention:   activity supervised   assistive device/personal items within reach   clutter free environment maintained   fall prevention program maintained   gait belt   toileting scheduled   safety round/check completed   room organization consistent   nonskid shoes/slippers when out of bed  Taken 8/1/2022 1640 by Kiersten Plummer, RN  Safety Promotion/Fall Prevention:   activity supervised   clutter free environment maintained   fall prevention program maintained   assistive device/personal items within reach   gait belt   safety round/check completed   toileting scheduled   room organization consistent   nonskid shoes/slippers when out of bed  Taken 8/1/2022 1420 by Kiersten Plummer, RN  Safety Promotion/Fall Prevention:   activity supervised   assistive device/personal items within reach   clutter free environment maintained   gait belt   fall prevention program maintained   toileting scheduled   safety round/check completed   room organization consistent   nonskid shoes/slippers when out of bed  Taken 8/1/2022 1200 by Kiersten Plummer, RN  Safety Promotion/Fall Prevention:   activity supervised   assistive device/personal items within reach   clutter free environment maintained   fall prevention program maintained   gait belt   toileting scheduled   safety round/check completed   room organization consistent   nonskid shoes/slippers when out of bed  Taken 8/1/2022 1045 by Kiersten Plummer, RN  Safety Promotion/Fall Prevention:   activity supervised   assistive device/personal  items within reach   clutter free environment maintained   fall prevention program maintained   gait belt   toileting scheduled   safety round/check completed   room organization consistent   nonskid shoes/slippers when out of bed  Taken 8/1/2022 0820 by Kiersten Plummer, RN  Safety Promotion/Fall Prevention:   activity supervised   assistive device/personal items within reach   clutter free environment maintained   fall prevention program maintained   gait belt   toileting scheduled   room organization consistent   safety round/check completed   nonskid shoes/slippers when out of bed     Problem: Pain Acute  Goal: Acceptable Pain Control and Functional Ability  Outcome: Ongoing, Not Progressing  Intervention: Prevent or Manage Pain  Recent Flowsheet Documentation  Taken 8/1/2022 0820 by Kiersten Plummer, RN  Medication Review/Management: medications reviewed  Intervention: Optimize Psychosocial Wellbeing  Recent Flowsheet Documentation  Taken 8/1/2022 1830 by Kiersten Plummer RN  Diversional Activities: television  Taken 8/1/2022 1640 by Kiersten Plummer RN  Diversional Activities: television  Taken 8/1/2022 1420 by Kiersten Plummer RN  Diversional Activities: television  Taken 8/1/2022 1200 by Kiersten Plummer RN  Diversional Activities: television  Taken 8/1/2022 1045 by Kiersten Plummer RN  Diversional Activities: television  Taken 8/1/2022 0820 by Kiersten Plummer RN  Diversional Activities: television     Problem: Adult Inpatient Plan of Care  Goal: Plan of Care Review  Outcome: Ongoing, Not Progressing  Flowsheets (Taken 8/1/2022 1923)  Progress: no change  Plan of Care Reviewed With: patient  Outcome Evaluation: Patient had uneventful day. VSS, paced on tele, RA, A&Ox4. Minimal c/o pain, PO pain meds given. Voiding well. Up w/ 1 assist. Up in chair. NPO at midnight for left great toe amputation in the morning. Small BM today. Continue to monitor.  Goal: Patient-Specific Goal  (Individualized)  Outcome: Ongoing, Not Progressing  Goal: Absence of Hospital-Acquired Illness or Injury  Outcome: Ongoing, Not Progressing  Intervention: Identify and Manage Fall Risk  Recent Flowsheet Documentation  Taken 8/1/2022 1830 by Kiersten Plummer RN  Safety Promotion/Fall Prevention:   activity supervised   assistive device/personal items within reach   clutter free environment maintained   fall prevention program maintained   gait belt   toileting scheduled   safety round/check completed   room organization consistent   nonskid shoes/slippers when out of bed  Taken 8/1/2022 1640 by Kiersten Plummer RN  Safety Promotion/Fall Prevention:   activity supervised   clutter free environment maintained   fall prevention program maintained   assistive device/personal items within reach   gait belt   safety round/check completed   toileting scheduled   room organization consistent   nonskid shoes/slippers when out of bed  Taken 8/1/2022 1420 by Kiersten Plummer, RN  Safety Promotion/Fall Prevention:   activity supervised   assistive device/personal items within reach   clutter free environment maintained   gait belt   fall prevention program maintained   toileting scheduled   safety round/check completed   room organization consistent   nonskid shoes/slippers when out of bed  Taken 8/1/2022 1200 by Kiersten Plummer, RN  Safety Promotion/Fall Prevention:   activity supervised   assistive device/personal items within reach   clutter free environment maintained   fall prevention program maintained   gait belt   toileting scheduled   safety round/check completed   room organization consistent   nonskid shoes/slippers when out of bed  Taken 8/1/2022 1045 by Kiersten Plummer, RN  Safety Promotion/Fall Prevention:   activity supervised   assistive device/personal items within reach   clutter free environment maintained   fall prevention program maintained   gait belt   toileting scheduled   safety round/check  completed   room organization consistent   nonskid shoes/slippers when out of bed  Taken 8/1/2022 0820 by Kiersten Plummer RN  Safety Promotion/Fall Prevention:   activity supervised   assistive device/personal items within reach   clutter free environment maintained   fall prevention program maintained   gait belt   toileting scheduled   room organization consistent   safety round/check completed   nonskid shoes/slippers when out of bed  Intervention: Prevent Skin Injury  Recent Flowsheet Documentation  Taken 8/1/2022 1830 by Kiersten Plummer RN  Body Position: (up in chair) other (see comments)  Skin Protection:   adhesive use limited   transparent dressing maintained   tubing/devices free from skin contact  Taken 8/1/2022 1640 by Kiersten Plummer RN  Body Position: (up in chair) other (see comments)  Skin Protection:   adhesive use limited   transparent dressing maintained   tubing/devices free from skin contact  Taken 8/1/2022 1420 by Kiersten Plummer RN  Body Position: (up in chair) other (see comments)  Skin Protection:   adhesive use limited   transparent dressing maintained   tubing/devices free from skin contact  Taken 8/1/2022 1200 by Kiersten Plummer RN  Body Position: (up in chair) other (see comments)  Skin Protection:   adhesive use limited   tubing/devices free from skin contact   transparent dressing maintained  Taken 8/1/2022 1045 by Kiersten Plummer RN  Body Position: (up in chair) other (see comments)  Skin Protection:   adhesive use limited   transparent dressing maintained   tubing/devices free from skin contact  Taken 8/1/2022 0820 by Kiersten Plummer RN  Body Position: (up in chair) other (see comments)  Skin Protection:   adhesive use limited   transparent dressing maintained   tubing/devices free from skin contact  Intervention: Prevent and Manage VTE (Venous Thromboembolism) Risk  Recent Flowsheet Documentation  Taken 8/1/2022 1830 by Kiersten Plummer RN  VTE  Prevention/Management:   bilateral   sequential compression devices off  Taken 8/1/2022 1640 by Kiersten Plummer RN  VTE Prevention/Management:   bilateral   sequential compression devices off  Taken 8/1/2022 1420 by Kiersten Plummer RN  VTE Prevention/Management:   bilateral   sequential compression devices off  Taken 8/1/2022 1200 by Kiersten Plummer RN  VTE Prevention/Management:   bilateral   sequential compression devices off  Taken 8/1/2022 1045 by Kiersten Plummer RN  VTE Prevention/Management:   bilateral   sequential compression devices off  Taken 8/1/2022 0820 by Kiersten Plummer RN  VTE Prevention/Management:   bilateral   sequential compression devices off  Intervention: Prevent Infection  Recent Flowsheet Documentation  Taken 8/1/2022 1830 by Kiersten Plummer RN  Infection Prevention: environmental surveillance performed  Taken 8/1/2022 1640 by Kiersten Plummer RN  Infection Prevention: environmental surveillance performed  Taken 8/1/2022 1420 by Kiersten Plummer RN  Infection Prevention: environmental surveillance performed  Taken 8/1/2022 1200 by Kiersten Plummer RN  Infection Prevention: environmental surveillance performed  Taken 8/1/2022 1045 by Kiersten Plummer RN  Infection Prevention: environmental surveillance performed  Taken 8/1/2022 0820 by Kiersten Plummer RN  Infection Prevention: environmental surveillance performed  Goal: Optimal Comfort and Wellbeing  Outcome: Ongoing, Not Progressing  Intervention: Provide Person-Centered Care  Recent Flowsheet Documentation  Taken 8/1/2022 1830 by Kiersten Plummer RN  Trust Relationship/Rapport:   care explained   choices provided  Taken 8/1/2022 1640 by Kiersten Plummer RN  Trust Relationship/Rapport:   care explained   choices provided  Taken 8/1/2022 1420 by Kiersten Plummer RN  Trust Relationship/Rapport:   care explained   choices provided  Taken 8/1/2022 1200 by Kiersten Plummer RN  Trust Relationship/Rapport:    care explained   choices provided  Taken 8/1/2022 1045 by Kiersten Plummer RN  Trust Relationship/Rapport:   care explained   choices provided  Taken 8/1/2022 0820 by Kiersten Plummer RN  Trust Relationship/Rapport:   care explained   choices provided   questions encouraged   questions answered   reassurance provided   thoughts/feelings acknowledged  Goal: Readiness for Transition of Care  Outcome: Ongoing, Not Progressing     Problem: Skin Injury Risk Increased  Goal: Skin Health and Integrity  Outcome: Ongoing, Not Progressing  Intervention: Optimize Skin Protection  Recent Flowsheet Documentation  Taken 8/1/2022 1830 by Kiersten Plummer RN  Pressure Reduction Techniques: frequent weight shift encouraged  Head of Bed (HOB) Positioning: HOB at 60-90 degrees  Pressure Reduction Devices: chair cushion utilized  Skin Protection:   adhesive use limited   transparent dressing maintained   tubing/devices free from skin contact  Taken 8/1/2022 1640 by Kiersten Plummer RN  Pressure Reduction Techniques: frequent weight shift encouraged  Head of Bed (HOB) Positioning: HOB at 60-90 degrees  Pressure Reduction Devices: chair cushion utilized  Skin Protection:   adhesive use limited   transparent dressing maintained   tubing/devices free from skin contact  Taken 8/1/2022 1420 by Kiersten Plummer RN  Pressure Reduction Techniques: frequent weight shift encouraged  Head of Bed (HOB) Positioning: HOB at 60-90 degrees  Pressure Reduction Devices: chair cushion utilized  Skin Protection:   adhesive use limited   transparent dressing maintained   tubing/devices free from skin contact  Taken 8/1/2022 1200 by Kiersten Plummer RN  Pressure Reduction Techniques: frequent weight shift encouraged  Head of Bed (HOB) Positioning: HOB at 60-90 degrees  Pressure Reduction Devices: chair cushion utilized  Skin Protection:   adhesive use limited   tubing/devices free from skin contact   transparent dressing maintained  Taken  8/1/2022 1045 by Kiersten Plummer, RN  Pressure Reduction Techniques: frequent weight shift encouraged  Head of Bed (HOB) Positioning: HOB at 60-90 degrees  Pressure Reduction Devices: chair cushion utilized  Skin Protection:   adhesive use limited   transparent dressing maintained   tubing/devices free from skin contact  Taken 8/1/2022 0820 by Kiersten Plummer, RN  Pressure Reduction Techniques: frequent weight shift encouraged  Head of Bed (HOB) Positioning: HOB at 60-90 degrees  Pressure Reduction Devices: chair cushion utilized  Skin Protection:   adhesive use limited   transparent dressing maintained   tubing/devices free from skin contact     Problem: Impaired Wound Healing  Goal: Optimal Wound Healing  Outcome: Ongoing, Not Progressing     Problem: Infection  Goal: Absence of Infection Signs and Symptoms  Outcome: Ongoing, Not Progressing   Goal Outcome Evaluation:  Plan of Care Reviewed With: patient        Progress: no change  Outcome Evaluation: Patient had uneventful day. VSS, paced on tele, RA, A&Ox4. Minimal c/o pain, PO pain meds given. Voiding well. Up w/ 1 assist. Up in chair. NPO at midnight for left great toe amputation in the morning. Small BM today. Continue to monitor.

## 2022-08-01 NOTE — PROGRESS NOTES
Pharmacy Consult-Vancomycin Dosing  Jermaine Singh is a  77 y.o. male receiving vancomycin therapy.     Indication: SSTI  Consulting Provider: hospitalist  ID Consult: Yes    Goal AUC: 400 - 600 mg/L*hr    Current Antimicrobial Therapy  Anti-Infectives (From admission, onward)      Ordered     Dose/Rate Route Frequency Start Stop    08/01/22 1209  vancomycin in dextrose 5% 150 mL (VANCOCIN) IVPB 750 mg        Ordering Provider: Miguel Bray RPH    750 mg Intravenous Every 12 Hours 08/01/22 2200 08/02/22 2159    08/01/22 1209  vancomycin in dextrose 5% 150 mL (VANCOCIN) IVPB 750 mg        Ordering Provider: Miguel Bray RPH    750 mg  over 60 Minutes Intravenous Every 12 Hours 08/01/22 1300 08/02/22 0059    07/28/22 0014  piperacillin-tazobactam (ZOSYN) 3.375 g in iso-osmotic dextrose 50 ml (premix)        Ordering Provider: Eliana Hightower APRN    3.375 g  over 4 Hours Intravenous Every 8 Hours 07/28/22 0800 08/02/22 0759    07/28/22 0014  Pharmacy to dose vancomycin        Ordering Provider: Eliana Hightower APRN     Does not apply Continuous PRN 07/28/22 0014 08/04/22 0013    07/27/22 2208  piperacillin-tazobactam (ZOSYN) 3.375 g in iso-osmotic dextrose 50 ml (premix)        Ordering Provider: Donny Hightower APRN    3.375 g  over 30 Minutes Intravenous Once 07/27/22 2210 07/28/22 0320    07/27/22 2208  vancomycin 2000 mg/500 mL 0.9% NS IVPB ([facility])        Ordering Provider: Donny Hightower APRN    20 mg/kg × 98 kg Intravenous Once 07/27/22 2210 07/28/22 0040            Allergies  Allergies as of 07/27/2022    (No Known Allergies)       Labs    Results from last 7 days   Lab Units 07/31/22  0820 07/30/22  0635 07/29/22  0533   BUN mg/dL 15 15 18   CREATININE mg/dL 1.18 1.10 1.14       Results from last 7 days   Lab Units 08/01/22  0918 07/29/22  0533 07/28/22  0348   WBC 10*3/mm3 7.81 8.39 10.87*       Evaluation of Dosing     Last Dose Received in the ED/Outside Facility: 2000mg in ED  Is Patient on  "Dialysis or Renal Replacement: -    Ht - 190.5 cm (75\")  Wt - 98 kg (216 lb)    Estimated Creatinine Clearance: 72.7 mL/min (by C-G formula based on SCr of 1.18 mg/dL).    Intake & Output (last 3 days)         07/29 0701 07/30 0700 07/30 0701 07/31 0700 07/31 0701 08/01 0700 08/01 0701 08/02 0700    P.O. 1050 100 525 240    IV Piggyback 250 250      Total Intake(mL/kg) 1300 (13.3) 350 (3.6) 525 (5.4) 240 (2.4)    Urine (mL/kg/hr) 1550 (0.7) 1400 (0.6) 300 (0.1)     Stool  0 0     Total Output 1550 1400 300     Net -250 -1050 +225 +240            Urine Unmeasured Occurrence 1 x 2 x 1 x     Stool Unmeasured Occurrence  1 x 1 x             Microbiology and Radiology  Microbiology Results (last 10 days)       Procedure Component Value - Date/Time    COVID PRE-OP / PRE-PROCEDURE SCREENING ORDER (NO ISOLATION) - Swab, Nasopharynx [[id number]]  (Normal) Collected: 07/27/22 2246    Lab Status: Final result Specimen: Swab from Nasopharynx Updated: 07/27/22 2357    Narrative:      The following orders were created for panel order COVID PRE-OP / PRE-PROCEDURE SCREENING ORDER (NO ISOLATION) - Swab, Nasopharynx.  Procedure                               Abnormality         Status                     ---------                               -----------         ------                     COVID-19 and FLU A/B PCR...[[id number]]  Normal              Final result                 Please view results for these tests on the individual orders.    COVID-19 and FLU A/B PCR - Swab, Nasopharynx [[id number]]  (Normal) Collected: 07/27/22 2246    Lab Status: Final result Specimen: Swab from Nasopharynx Updated: 07/27/22 2357     COVID19 Not Detected     Influenza A PCR Not Detected     Influenza B PCR Not Detected    Narrative:      Fact sheet for providers: https://www.fda.gov/media/275578/download    Fact sheet for patients: https://www.fda.gov/media/010100/download    Test performed by PCR.    Blood Culture - Blood, Hand, Left [[id number]]  " (Normal) Collected: 07/27/22 2040    Lab Status: Preliminary result Specimen: Blood from Hand, Left Updated: 07/31/22 2132     Blood Culture No growth at 4 days    Blood Culture - Blood, Hand, Right [[id number]]  (Normal) Collected: 07/27/22 2030    Lab Status: Preliminary result Specimen: Blood from Hand, Right Updated: 07/31/22 2132     Blood Culture No growth at 4 days            Reported Vancomycin Levels    Results from last 7 days   Lab Units 08/01/22  0918   VANCOMYCIN RM mcg/mL 12.80             Results from last 7 days   Lab Units 07/29/22  1211 07/28/22  1411   VANCOMYCIN TR mcg/mL 9.80 10.00          [facility] AUC Calculation:    Current AUC: 384 mg/L*hr    Predicted Steady State AUC :   404 mg/L*hr    Predicted Steady State AUC on new dose: 449 mg/L*hr    Assessment/Plan:   1. Pharmacy to dose vancomycin for SSTI.   2. Patient received a loading dose of vancomycin 2000 mg on 7/28.   3. Patient was receiving maintenance dose of vancomycin 1000 mg q18h. Predicted AUC is at bottom end of therapeutic at 404 and returns subtherapeutic today. (Goal -600)    4. Vancomycin trough was drawn appropriately and is at 12.8 mcg/mL.   5. Will change to a maintenance dose of vancomycin 750 mg q12h.  6. Vancomycin random scheduled for 8/3 prior to the 4th dose. Note that vancomycin is scheduled to be completed on 8/2. Will continue to monitor and will remove order if therapy is no longer indicated.   7. Monitor renal function, cultures and sensitivities, and clinical status, and adjust regimen as necessary.  Pharmacy will continue to follow.      Miguel Bray PharmD  08/01/22  12:12 EDT

## 2022-08-01 NOTE — CONSULTS
Cardiology Consult - [facility]    Jermaine YUDY Francisco     [id number]  1945  [address]  [facility] [address]         Admission Date:  7/27/2022    Consultation Date:  08/01/22        PCP:  Farooq Painter MD  Referring MD:  Dr. Márquez - [facility]  Consulting MD:  Dr. Apodaca          CC:  Osteomyelitis Left great toe    Reason for Consult: Evaluation for possible revascularization.      Type 2 diabetes mellitus (HCC)    Hypertension    Hyperlipidemia    Hypothyroidism (acquired)    S/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 Au    SSS (sick sinus syndrome) (HCC)    Dislocation of left shoulder joint, initial encounter    Cellulitis in diabetic foot (HCC)    Fall    Sepsis (HCC)    Leukocytosis    Lactic acidosis    Elevated C-reactive protein (CRP)    Elevated sed rate (elev SR)    Proximal phalanx fracture of the second digit extending into the second metatarsal joint      Allergies:  has No Known Allergies.    Medications Prior to Admission   Medication Sig Dispense Refill Last Dose   • metoprolol succinate XL (TOPROL-XL) 50 MG 24 hr tablet Take 1 tablet by mouth Daily. 20 tablet 0 Patient Taking Differently at Unknown time   • amLODIPine (NORVASC) 5 MG tablet Take 5 mg by mouth Every Morning.      • aspirin 81 MG chewable tablet Chew 81 mg Daily.      • Blood Glucose Monitoring Suppl (ONE TOUCH ULTRA 2) w/Device kit USE TO CHECK GLUCOSE ONCE DAILY AS DIRECTED      • cetirizine (zyrTEC) 10 MG tablet Take 10 mg by mouth Daily.      • Cholecalciferol (VITAMIN D3) 5000 units tablet tablet Take 5,000 Units by mouth Daily.      • Continuous Blood Gluc  (DEXCOM G6 ) device See Admin Instructions.      • Continuous Blood Gluc Sensor (DEXCOM G6 SENSOR) APPLY 1 SENSOR AND CHANGE EVERY 10 DAYS      • Continuous Blood Gluc Sensor (Dexcom G6 Sensor) Every 10 (Ten) Days. 1 each 0    • Continuous Blood Gluc Transmit (DEXCOM G6 TRANSMITTER) misc ONE TRANSMITTER FOR EVERY 3 MONTHS      • insulin  detemir (LEVEMIR FLEXTOUCH) 100 UNIT/ML injection Inject 44 Units under the skin into the appropriate area as directed Every Night. (Patient taking differently: Inject 34 Units under the skin into the appropriate area as directed Every Night.)      • insulin lispro (humaLOG) 100 UNIT/ML injection Inject 5 Units under the skin into the appropriate area as directed 3 (Three) Times a Day Before Meals. 1 each 1    • levothyroxine (SYNTHROID, LEVOTHROID) 50 MCG tablet Take 50 mcg by mouth Every Morning.      • MM PEN NEEDLES 32G X 4 MM misc USE 4 PER DAY      • sodium chloride (OCEAN) 0.65 % nasal spray 1 spray into the nostril(s) as directed by provider As Needed for Congestion.          Pharmacy to dose vancomycin,       HPI:  Jermaine Singh is a 76 yo CM with PMHx of CAD (CABG), HTN, HLP, DMII, hypothyroidism, PVD with prior stenting, SSS with SJM pacemaker.  He presents to [facility] ED after a fall resulting in left shoulder pain.  His shoulder was noted to be dislocated which was reduced in the ED.  He was admitted to hospitalist service and has been followed by ortho.  At time of admission, he was noted to be wearing an ortho shoe on his left foot for a non healing would on his left great/2nd toe.  He was following a podiatrist and receiving antibiotics as an outpatient.  At time of admission, his WBC and inflammatory markers were elevated.  Xray of the left foot suggested osteomyelitis.  He is now followed by ID and CTS for further care.    With his known CAD (CABG) and PVDz, we were asked to weigh in on options of revascularization vs amputation of that toe.  Last evaluation of his PVDz was July 29, 2021.  Left/Right iliac stents were patent.  He has severe infrapopliteal disease bilaterally with good collateral flow.            Social History     Socioeconomic History   • Marital status:    • Number of children: 2   Tobacco Use   • Smoking status: Never Smoker   • Smokeless tobacco: Never Used   Substance and  "Sexual Activity   • Alcohol use: No   • Drug use: No   • Sexual activity: Defer     Family History   Problem Relation Age of Onset   • Coronary artery disease Mother    • Diabetes Mother    • Cancer Father      Past Surgical History:   Procedure Laterality Date   • APPENDECTOMY     • CARDIAC CATHETERIZATION N/A 2/14/2019    Procedure: Left Heart Cath;  Surgeon: Cooper Apodaca MD;  Location:  [facility];  Service: Cardiology   • CARDIAC ELECTROPHYSIOLOGY PROCEDURE N/A 5/18/2022    Procedure: DEVICE IMPLANT;  Surgeon: Cooper Apoadca MD;  Location:  [facility];  Service: Cardiology;  Laterality: N/A;   • COLONOSCOPY     • CORONARY ARTERY BYPASS GRAFT N/A 3/22/2019    Procedure: MEDIAN STERNOTOMY, CORONARY ARTERY BYPASS GRAFT X3, UTILIZING THE LEFT INTERNAL MAMMARY ARTERY, EVH AND OPEN HARVEST OF THE RIGHT GREATER SAPHENOUS VEIN, EXPLORATION OF THE LEFT LEG;  Surgeon: Ap Au MD;  Location: [facility] OR;  Service: Cardiothoracic   • EYE SURGERY Bilateral     cataracts    • INTERVENTIONAL RADIOLOGY PROCEDURE N/A 7/29/2021    Procedure: Abdominal Aortagram with Runoff;  Surgeon: Jermaine Hernandez MD;  Location:  [facility];  Service: Cardiovascular;  Laterality: N/A;   • KNEE ARTHROSCOPY      right x 2, left x 1   • LACERATION REPAIR      right leg   • LEG SURGERY      2 for fracture of rt leg    • TONSILLECTOMY      Adnoidectomy     ROS: Pertinent items are noted in HPI, all other systems reviewed and negative     Objective     height is 190.5 cm (75\") and weight is 98 kg (216 lb). His oral temperature is 98.3 °F (36.8 °C). His blood pressure is 176/89 and his pulse is 73. His respiration is 16 and oxygen saturation is 96%.      Intake/Output Summary (Last 24 hours) at 8/1/2022 0830  Last data filed at 7/31/2022 2254  Gross per 24 hour   Intake 525 ml   Output 300 ml   Net 225 ml     Intake/Output                       07/30/22 0701 - 07/31/22 " 0700 07/31/22 0701 - 08/01/22 0700     5617-3634 5320-2454 Total 2613-6476 5592-3110 Total                 Intake    P.O.  --  100 100  525  -- 525    IV Piggyback  --  250 250  --  -- --    Total Intake -- 350 350 525 -- 525       Output    Urine  800  600 1400  200  100 300    Total Output  200 100 300             07/27/22  1848 07/28/22  1610   Weight: 98 kg (216 lb) 98 kg (216 lb)          Physical Exam:  General Appearance:    Alert, cooperative, in no acute distress   Head:    Normocephalic, without obvious abnormality, atraumatic   Eyes:            Lids and lashes normal, conjunctivae and sclerae normal, no   icterus, no pallor, corneas clear, PERRLA   Ears:    Ears appear intact with no abnormalities noted   Throat:   No oral lesions, no thrush, oral mucosa moist   Neck:   No adenopathy, supple, trachea midline, no thyromegaly, no carotid bruit, no JVD   Back:     No kyphosis present, no scoliosis present, no skin lesions,    erythema or scars, no tenderness to percussion or                   palpation, range of motion normal   Lungs:     Clear to auscultation,respirations regular, even and               unlabored    Heart:    Regular rhythm and normal rate, normal S1 and S2, no         murmur, no gallop, no rub, no click   Abdomen:     Normal bowel sounds, no masses, no organomegaly, soft     nontender, nondistended, no guarding, no rebound   tenderness   Extremities:   Moves all extremities well,  no cyanosis, no redness, no edema   Pulses:   Pulses palpable and equal bilaterally   Skin:   No bleeding, bruising or rash   Lymph nodes:   No palpable adenopathy   Neurologic:   Cranial nerves 2 - 12 grossly intact, sensation intact, DTR     present and equal bilaterally          Results Review:  I personally reviewed the patient's clinical results.  Results from last 7 days   Lab Units 07/29/22  0533   WBC 10*3/mm3 8.39   HEMOGLOBIN g/dL 13.2   HEMATOCRIT % 39.0   PLATELETS 10*3/mm3 233     Results  from last 7 days   Lab Units 07/31/22  0820 07/28/22  0348 07/27/22  2041   SODIUM mmol/L 137   < > 138   POTASSIUM mmol/L 4.0   < > 4.5   CHLORIDE mmol/L 103   < > 102   CO2 mmol/L 26.0   < > 26.0   BUN mg/dL 15   < > 22   CREATININE mg/dL 1.18   < > 1.05   CALCIUM mg/dL 8.7   < > 9.2   BILIRUBIN mg/dL  --   --  0.2   ALK PHOS U/L  --   --  81   ALT (SGPT) U/L  --   --  19   AST (SGOT) U/L  --   --  15   GLUCOSE mg/dL 233*   < > 273*    < > = values in this interval not displayed.     Results from last 7 days   Lab Units 07/31/22  0820   SODIUM mmol/L 137   POTASSIUM mmol/L 4.0   CHLORIDE mmol/L 103   CO2 mmol/L 26.0   BUN mg/dL 15   CREATININE mg/dL 1.18   GLUCOSE mg/dL 233*   CALCIUM mg/dL 8.7                             Radiology:  Imaging Results (Last 72 Hours)     Procedure Component Value Units Date/Time    NM Bone Scan 3 Phase [[id number]] Collected: 07/29/22 1700     Updated: 07/29/22 1707    Narrative:      DATE OF EXAM: 7/29/2022 8:20 AM     PROCEDURE: NM BONE SCAN 3 PHASE-     INDICATIONS: Left great toe ulceration; S43.005A-Unspecified dislocation  of left shoulder joint, initial encounter; L03.116-Cellulitis of left  lower limb; Z78.9-Other specified health status     COMPARISON: Left foot radiographs dated 07/27/2022     TECHNIQUE: 22 mCi of technetium 99m labeled MDP was administered  intravenously and scintigraphic imaging occurred through the bilateral  feet in three phases per protocol.     FINDINGS:  There is increased blood flow to the distal aspect of the left foot  including the great toe. There is increased blood pool activity within  the left great toe and to a lesser extent the third and fourth toes.  There is delayed uptake involving the distal phalanx of the first digit  compatible with three-phase positivity. There is also three-phase  positive uptake involving the second digit although this could relate to  the fracture identified.        Impression:      1. Three-phase positive bone  scan involving the distal phalanx of the  first digit which would be compatible with osteomyelitis in the correct  clinical setting.  2. Three-phase positive uptake involving the second digit which is  favored to relate to the acute fracture identified on radiograph.  Osteomyelitis of the second digit is felt to be less likely.     This report was finalized on 7/29/2022 5:04 PM by Carlton Marinelli MD.               Tele:  NSR      Assessment & Plan     1.  Peripheral Vascular Disease   -  Remote Bilateral iliac stenting with significant infrapopliteal disease, good collateral flow.   - He has excellent pulses bilaterally by doppler   -  With his collateral flow/pulses, revascularization is not warranted.  This was discussed with Dr. Márquez.  Patient would like to further discuss with wife when she arrives.    2.  CAD   -  Remote CABG   -  Currently asymptomatic.   -  Continue ASA    3.  Osteomyelitis   -  Left toe wound being followed by podiatrist as outpt with ortho shoe/oral antibiotics    -  Elevated inflammatory markers/leukocytosis upon arrival with XRay suggesting osteo.   -  IV Antibiotics per ID.  Dr. Márquez will continue to follow. Amputation recommended.    4.  DMII   -  Per primary service.    5.  Essential Hypertension   -  Poorly controlled   -  DC Coreg. Resume home dose Toprol XL 50mg daily.  Consider adding ACEI if further BP management is needed - will go ahead and start Lisinopril 5mg daily.  Check daily labs.      6.  Hyperlipidemia   -  Check lipid status   -  Will address statin.    7.  Left shoulder dislocation   -  S/P fall prompting ED visit/admission.   -  PT/OT   -  If patient agrees with amputation, may need short term rehab at time of discharge.        76 yo CM with known CAD/CABG, PVDz with prior stenting.  Presents after fall resulting in left shoulder dislocation as well as presenting with left toe wound.  Inflammatory markers and WBC elevated, Xray suggests osteomyelitis.  He has known  Ren with severe infrapopliteal disease.  However, he has bilateral collateral flow and good pulses by doppler bilaterally.  At this time, there is no need for revascularization of the LLE and we recommend pursuing treatment for osteomyelitis including antibiotics and amputation.         I discussed the patient's findings and my recommendations with the patient, any present family members, and the nursing staff.  Cooper Apodaca MD saw and examined patient, verified hx and PE, read all radiographic studies, reviewed labs and micro data, and formulated dx, plan for treatment and all medical decision making.      Addendum: Case discussed with Dr. Márquez.  Patient has stable infra popliteal PAD that appears well collateralized.  He has excellent Doppler signals in both his dorsalis pedis and posterior tibialis locations in the left foot.  I do not think there is anything to be gained by further evaluation and mechanical treatment of his PAD.  I recommend left great toe amputation and continued medical therapy.    AASHISH Del Rio MD  08/01/22 08:30 EDT

## 2022-08-01 NOTE — PROGRESS NOTES
[facility] Medicine Services  PROGRESS NOTE    Patient Name: Jermaine Singh  : 1945  MRN: [id number]    Date of Admission: 2022  Primary Care Physician: Farooq Painter MD    Subjective   Subjective     CC:  S/p fall, left toe wound, shoulder dislocation     HPI:  Spoke with vascular no intervention recommended amputation.  Patient is tolerating p.o. awaiting surgery.    ROS:  Gen- No fevers, chills  CV- No chest pain, palpitations  Resp- No cough, dyspnea  GI- No N/V/D, abd pain        Objective   Objective     Vital Signs:   Temp:  [97.5 °F (36.4 °C)-98.3 °F (36.8 °C)] 98.3 °F (36.8 °C)  Heart Rate:  [63-74] 73  Resp:  [16-18] 16  BP: (127-176)/(77-99) 176/89     Physical Exam:  Constitutional: No acute distress, awake, alert  HENT: NCAT, mucous membranes moist  Respiratory: Clear to auscultation bilaterally, respiratory effort normal room air   Cardiovascular: RRR, no murmurs, rubs, or gallops  Gastrointestinal: Positive bowel sounds, soft, nontender, nondistended  Musculoskeletal: left shoulder in sling, mild LE edema   Psychiatric: Appropriate affect, cooperative  Neurologic: Oriented x 3, strength symmetric in all extremities, Cranial Nerves grossly intact to confrontation, speech clear  Skin: The left great toe has a abrasion on the plantar aspect medially also the second digit has a noted skin change distally on the medial portion. Great toe with skin discoloration necrotic areas with erythema       Results Reviewed:  LAB RESULTS:      Lab 22  0533 22  0348 22  0004 221   WBC 8.39 10.87*  --  12.22*   HEMOGLOBIN 13.2 12.9*  --  14.2   HEMATOCRIT 39.0 37.3*  --  41.2   PLATELETS 233 237  --  239   NEUTROS ABS 5.24 8.15*  --  9.95*   IMMATURE GRANS (ABS) 0.02 0.03  --  0.04   LYMPHS ABS 1.88 1.57  --  1.09   MONOS ABS 0.82 0.97*  --  0.94*   EOS ABS 0.36 0.11  --  0.14   MCV 87.1 84.8  --  86.0   SED RATE  --   --   --  43*   CRP  --   --    --  2.33*   PROCALCITONIN  --   --   --  0.08   LACTATE  --  1.5 2.2* 2.6*         Lab 07/31/22  0820 07/30/22  0635 07/29/22  0533 07/28/22  0348 07/27/22 2041   SODIUM 137 138 138 144 138   POTASSIUM 4.0 4.0 4.2 4.9 4.5   CHLORIDE 103 104 104 108* 102   CO2 26.0 28.0 27.0 31.0* 26.0   ANION GAP 8.0 6.0 7.0 5.0 10.0   BUN 15 15 18 20 22   CREATININE 1.18 1.10 1.14 0.93 1.05   EGFR 63.6 69.1 66.2 84.6 73.1   GLUCOSE 233* 220* 253* 192* 273*   CALCIUM 8.7 8.6 8.8 9.2 9.2   MAGNESIUM  --   --   --  2.0  --    HEMOGLOBIN A1C  --   --   --  9.00*  --          Lab 07/27/22 2041   TOTAL PROTEIN 7.1   ALBUMIN 3.70   GLOBULIN 3.4   ALT (SGPT) 19   AST (SGOT) 15   BILIRUBIN 0.2   ALK PHOS 81                     Brief Urine Lab Results  (Last result in the past 365 days)      Color   Clarity   Blood   Leuk Est   Nitrite   Protein   CREAT   Urine HCG        02/27/22 1321 Dark Yellow   Clear   Negative   Negative   Negative   2+                 Microbiology Results Abnormal     Procedure Component Value - Date/Time    Blood Culture - Blood, Hand, Left [[id number]]  (Normal) Collected: 07/27/22 2040    Lab Status: Preliminary result Specimen: Blood from Hand, Left Updated: 07/31/22 2132     Blood Culture No growth at 4 days    Blood Culture - Blood, Hand, Right [[id number]]  (Normal) Collected: 07/27/22 2030    Lab Status: Preliminary result Specimen: Blood from Hand, Right Updated: 07/31/22 2132     Blood Culture No growth at 4 days    COVID PRE-OP / PRE-PROCEDURE SCREENING ORDER (NO ISOLATION) - Swab, Nasopharynx [[id number]]  (Normal) Collected: 07/27/22 2246    Lab Status: Final result Specimen: Swab from Nasopharynx Updated: 07/27/22 7936    Narrative:      The following orders were created for panel order COVID PRE-OP / PRE-PROCEDURE SCREENING ORDER (NO ISOLATION) - Swab, Nasopharynx.  Procedure                               Abnormality         Status                     ---------                               -----------          ------                     COVID-19 and FLU A/B PCR...[[id number]]  Normal              Final result                 Please view results for these tests on the individual orders.    COVID-19 and FLU A/B PCR - Swab, Nasopharynx [[id number]]  (Normal) Collected: 07/27/22 2246    Lab Status: Final result Specimen: Swab from Nasopharynx Updated: 07/27/22 6305     COVID19 Not Detected     Influenza A PCR Not Detected     Influenza B PCR Not Detected    Narrative:      Fact sheet for providers: https://www.fda.gov/media/009055/download    Fact sheet for patients: https://www.fda.gov/media/915570/download    Test performed by PCR.          No radiology results from the last 24 hrs        I have reviewed the medications:  Scheduled Meds:Pharmacy Consult, , Does not apply, Once  amLODIPine, 10 mg, Oral, Daily  aspirin, 81 mg, Oral, Daily  cetirizine, 5 mg, Oral, Daily  heparin (porcine), 5,000 Units, Subcutaneous, Q8H  insulin detemir, 25 Units, Subcutaneous, Nightly  insulin lispro, 0-9 Units, Subcutaneous, 4x Daily With Meals & Nightly  Insulin Lispro, 3 Units, Subcutaneous, TID With Meals  levothyroxine, 50 mcg, Oral, Q AM  metoprolol succinate XL, 50 mg, Oral, Daily  piperacillin-tazobactam, 3.375 g, Intravenous, Q8H  Propofol, 200 mg, Intravenous, Once  sodium chloride, 10 mL, Intravenous, Q12H  vancomycin, 1,000 mg, Intravenous, Q18H  vitamin D3, 5,000 Units, Oral, Daily      Continuous Infusions:Pharmacy to dose vancomycin,       PRN Meds:.acetaminophen **OR** acetaminophen **OR** acetaminophen  •  dextrose  •  dextrose  •  glucagon (human recombinant)  •  HYDROcodone-acetaminophen  •  HYDROmorphone  •  Pharmacy to dose vancomycin  •  [COMPLETED] Insert peripheral IV **AND** sodium chloride  •  sodium chloride    Assessment & Plan   Assessment & Plan     Active Hospital Problems    Diagnosis  POA   • Proximal phalanx fracture of the second digit extending into the second metatarsal joint [S92.910R]  Unknown   •  Dislocation of left shoulder joint, initial encounter [S43.005A]  Yes   • Cellulitis in diabetic foot ([facility]) [E11.628, L03.119]  Yes   • Fall [W19.XXXA]  Yes   • Sepsis ([facility]) [A41.9]  Yes   • Leukocytosis [D72.829]  Yes   • Lactic acidosis [E87.2]  Yes   • Elevated C-reactive protein (CRP) [R79.82]  Yes   • Elevated sed rate (elev SR) [R70.0]  Yes   • SSS (sick sinus syndrome) ([facility]) [I49.5]  Yes   • Hyperlipidemia [E78.5]  Yes   • Hypertension [I10]  Yes   • S/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 Au [Z95.1]  Not Applicable   • Hypothyroidism (acquired) [E03.9]  Yes   • Type 2 diabetes mellitus ([facility]) [E11.9]  Yes      Resolved Hospital Problems   No resolved problems to display.        Brief Hospital Course to date:  Jermaine Singh is a 77 y.o. male with PMH significant for CAD s/p CABG x3, DM 2, hypothyroid, HTN, HLD, PVD s/p stent in RLE, who presents to the ED with complaint of fall and left shoulder pain is found to have concern for left shoulder dislocation that was reduced while in the ED as well as left great toe failing outpatient antibiotic.      Plan was partially entered by my partner and I have reviewed and updated as appropriate on 7/31/22     Sepsis: Leukocytosis, lactic acidosis, elevated CRP/sed rate.  Source: Left great toe wound with cellulitis  PVD  - Concerning for decreased blood flow  - Wound consult  - Request records from Dr. Joseph podiatry  - X-ray negative for osteo-, does note fracture  - Unable to do MRI secondary to PPM  - On doxycycline outpatient, hold for now  - Vancomycin and Zosyn started in the ED, will continue per ID recs  - ID following  -Arterial duplex of the left preliminary report reviewed awaiting vascular read. will consult cardiology for advice on re vascularization   -- Bone scan first digit compatible with osteomyelitis and second digit felt to be less likely   -Anticipate amputation.     Fall  Left shoulder dislocation  Acute fracture proximal phalanx of the second digit  extending into the second metatarsophalangeal joint  - Successful reduction while in the ED  - Ortho consulted- recommends non operative treatment and sling for comfort. PT for range of motion exercises, avoiding abduction and external rotation. F/u outpatient w Dr Gipson in 2 to 3 wks  - As needed pain control  - Fall precautions        Diabetes mellitus 2  - FSBG ACHS blood sugars  - Blood glucose 176 this morning 8/1/2022 looks like the Levemir dose was just increased to 25 yesterday.  It has not had time to fully have its effect yet.  Blood sugars for the last 24 hours reviewed mostly in the mid 100s.  There was a elevated blood sugar to 272 last p.m. which corrected by the morning with sliding scale as currently prescribed.  We will hold off on any further increases to his insulin regimen right now.  - SS insulin and add bolus with meals  - Hemoglobin A1c is 9     Hypertension  CAD  SSS  - S/p CABG  - S/p PPM  - increase amlodipine  - Consistently elevated the last 24 hours pressures.  Will change metoprolol to carvedilol.  -     Hypothyroid  - Continue levothyroxine    Expected Discharge Location and Transportation: may need rehab  Expected Discharge Date: TBD may need surgery     DVT prophylaxis:  Medical DVT prophylaxis orders are present.     AM-PAC 6 Clicks Score (PT): 19 (07/31/22 0800)    CODE STATUS:   Code Status and Medical Interventions:   Ordered at: 07/28/22 0000     Code Status (Patient has no pulse and is not breathing):    CPR (Attempt to Resuscitate)     Medical Interventions (Patient has pulse or is breathing):    Full Support       Peter Braga DO  08/01/22

## 2022-08-01 NOTE — PROGRESS NOTES
Cardiothoracic Surgery Progress Note      POD #:     LOS: 5 days      Subjective: Awake and seems alert  Chief complaint: Foot pain left foot  Objective:  Vital Signs vital signs below noted T-max past 24 hours 98.3 °F  Temp:  [97.5 °F (36.4 °C)-98.3 °F (36.8 °C)] 97.5 °F (36.4 °C)  Heart Rate:  [63-72] 69  Resp:  [15-18] 16  BP: (127-178)/(77-98) 160/85    Physical Exam:   General Appearance: Oriented   Lungs:   Heart:   Skin: Skin ulceration left great toe painted with Betadine and cover with Optifoam dressing and Spandage.   Incision:     Results:  Results from last 7 days   Lab Units 07/29/22  0533   WBC 10*3/mm3 8.39   HEMOGLOBIN g/dL 13.2   HEMATOCRIT % 39.0   PLATELETS 10*3/mm3 233     Results from last 7 days   Lab Units 07/31/22  0820   SODIUM mmol/L 137   POTASSIUM mmol/L 4.0   CHLORIDE mmol/L 103   CO2 mmol/L 26.0   BUN mg/dL 15   CREATININE mg/dL 1.18   GLUCOSE mg/dL 233*   CALCIUM mg/dL 8.7         Assessment: #1.  Osteomyelitis of the left great toe secondary to severe peripheral vascular and diabetic mellitus./Neuropathy  #2.  Status post remote coronary bypass grafting #3.  Post pacemaker for sick sinus syndrome #4.  Recent fall with left shoulder dislocation and left shoulder reduction in the emergency room.  5.  Prior endovascular vascularization of the right lower extremity per Dr. Hernandez in 2019.    Plan: Consultation with Dr. Edison Apodaca cardiology/endovascular revascularization.  We will await his opinion on the vascular supply to the left lower extremity.   Overall medical manage per hospitalist.  Antibiotics per infectious disease.    Gopal Márquez MD - 08/01/22 - 05:37 EDT

## 2022-08-01 NOTE — PLAN OF CARE
Goal Outcome Evaluation:  Plan of Care Reviewed With: patient        Progress: no change  Outcome Evaluation: Pt re-educated L shoulder precautions, sling wear for comfort and donna-dressing the LUE. Pt tolerated LUE ther ex w/in MD parameters (no ABDuction and ER). SUA for grooming this date. Continue to progress as able per current POC.

## 2022-08-01 NOTE — THERAPY TREATMENT NOTE
Patient Name: Jermaine Singh  : 1945    MRN: [id number]                              Today's Date: 2022       Admit Date: 2022    Visit Dx:     ICD-10-CM ICD-9-CM   1. Dislocation of left shoulder joint, initial encounter  S43.005A 831.00   2. Cellulitis of left foot  L03.116 682.7   3. 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  Preserved LV function   • Type 2 diabetes mellitus ([facility])   • Hypertension   • Hyperlipidemia   • Hypothyroidism (acquired)   • Vitamin D deficiency on Rx    • Serum Cr 1.32 on admission.  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 Au   • Dizziness   • Atherosclerosis of native artery of both lower extremities with intermittent claudication ([facility])   • SSS (sick sinus syndrome) ([facility])   • Dislocation of left shoulder joint, initial encounter   • Cellulitis in diabetic foot ([facility])   • Fall   • Sepsis ([facility])   • Leukocytosis   • Lactic acidosis   • Elevated C-reactive protein (CRP)   • Elevated sed rate (elev SR)   • Proximal phalanx fracture of the second digit extending into the second metatarsal joint     Past Medical History:   Diagnosis Date   • Asthma    • Coronary artery disease    • Diabetes mellitus ([facility])     started on [place] 2018; started on po meds in ; checking blood sugars daily    • Disease of thyroid gland     po meds daily for hypothyroidism    • History of fracture as a child     rt leg- severe    • Hyperlipidemia    • Hypertension    • Hypothyroidism    • Peripheral neuropathy    • Peripheral vascular disease ([facility])     s/p angiogram -needs stent in left leg    • RBBB    • Right knee pain    • Vitamin D deficiency      Past Surgical History:   Procedure Laterality Date   • APPENDECTOMY     • CARDIAC CATHETERIZATION N/A 2019    Procedure: Left Heart Cath;  Surgeon: Cooper Apodaca  "MD ALEXY;  Location:  [facility];  Service: Cardiology   • CARDIAC ELECTROPHYSIOLOGY PROCEDURE N/A 5/18/2022    Procedure: DEVICE IMPLANT;  Surgeon: Cooper Apodaca MD;  Location:  [facility];  Service: Cardiology;  Laterality: N/A;   • COLONOSCOPY     • CORONARY ARTERY BYPASS GRAFT N/A 3/22/2019    Procedure: MEDIAN STERNOTOMY, CORONARY ARTERY BYPASS GRAFT X3, UTILIZING THE LEFT INTERNAL MAMMARY ARTERY, EVH AND OPEN HARVEST OF THE RIGHT GREATER SAPHENOUS VEIN, EXPLORATION OF THE LEFT LEG;  Surgeon: Ap Au MD;  Location:  [facility];  Service: Cardiothoracic   • EYE SURGERY Bilateral     cataracts    • INTERVENTIONAL RADIOLOGY PROCEDURE N/A 7/29/2021    Procedure: Abdominal Aortagram with Runoff;  Surgeon: Jermaine Hernandez MD;  Location:  [facility];  Service: Cardiovascular;  Laterality: N/A;   • KNEE ARTHROSCOPY      right x 2, left x 1   • LACERATION REPAIR      right leg   • LEG SURGERY      2 for fracture of rt leg    • TONSILLECTOMY      Adnoidectomy      General Information     Row Name 08/01/22 1751          Physical Therapy Time and Intention    Document Type therapy note (daily note)  -     Mode of Treatment physical therapy  -     Row Name 08/01/22 1751          General Information    Patient Profile Reviewed yes  -     Existing Precautions/Restrictions fall;pacemaker  s/p mechanical fall with L shoulder dislocation reduced in ED, sling for comfort, per ortho consult pt cleared for LUE ROM \"avoiding ABDuction and ER.\" PMH: L great toe wound/cellulitis with Darco shoe for mobility. RN to clarify LUE WB status  -     Row Name 08/01/22 1751          Cognition    Orientation Status (Cognition) oriented x 4  -     Row Name 08/01/22 1751          Safety Issues, Functional Mobility    Impairments Affecting Function (Mobility) balance;endurance/activity tolerance;pain;strength;sensation/sensory awareness;postural/trunk control  -  "          User Key  (r) = Recorded By, (t) = Taken By, (c) = Cosigned By    Initials Name Provider Type     Thelma Rich PT Physical Therapist               Mobility     Row Name 08/01/22 1752          Bed Mobility    Comment, (Bed Mobility) UIC  -     Row Name 08/01/22 1752          Transfers    Comment, (Transfers) VC for hand placement  -     Row Name 08/01/22 1752          Sit-Stand Transfer    Sit-Stand [place] (Transfers) contact guard  -     Assistive Device (Sit-Stand Transfers) cane, straight;walker, donna  -HP     Comment, (Sit-Stand Transfer) Darco shoe donned  -     Row Name 08/01/22 1752          Gait/Stairs (Locomotion)    [place] Level (Gait) contact guard  -     Assistive Device (Gait) cane, straight  -     Distance in Feet (Gait) 360  -     Deviations/Abnormal Patterns (Gait) bilateral deviations;antalgic;ashli decreased;gait speed decreased  -     Bilateral Gait Deviations forward flexed posture  -     Left Sided Gait Deviations weight shift ability decreased  -     Comment, (Gait/Stairs) Pt amb 360' with FWW and CGAx2. Good balance noted. Activity limited by SOA and fatigue.  -     Row Name 08/01/22 1752          Mobility    Extremity Weight-bearing Status left upper extremity  -     Left Upper Extremity (Weight-bearing Status) non weight-bearing (NWB)  RN to clarify with ortho  -HP           User Key  (r) = Recorded By, (t) = Taken By, (c) = Cosigned By    Initials Name Provider Type     Thelma Rich PT Physical Therapist               Obj/Interventions     Row Name 08/01/22 1754          Range of Motion Comprehensive    General Range of Motion no range of motion deficits identified  -     Row Name 08/01/22 1754          Balance    Balance Assessment sitting static balance;sitting dynamic balance;sit to stand dynamic balance;standing static balance;standing dynamic balance  -     Static Sitting Balance standby assist  -     Dynamic Sitting Balance  standby assist  -HP     Position, Sitting Balance sitting in chair  -     Static Standing Balance contact guard  -     Dynamic Standing Balance contact guard  -     Position/Device Used, Standing Balance supported  -     Balance Interventions sitting;standing;sit to stand;occupation based/functional task  -           User Key  (r) = Recorded By, (t) = Taken By, (c) = Cosigned By    Initials Name Provider Type    Thelma Kramer PT Physical Therapist               Goals/Plan    No documentation.                Clinical Impression     Row Name 08/01/22 1754          Pain    Pretreatment Pain Rating 2/10  -HP     Posttreatment Pain Rating 2/10  -HP     Pain Location - Side/Orientation Left  -     Pain Location anterior  -     Pain Location - shoulder  -     Row Name 08/01/22 1754          Pain Scale: FACES Pre/Post-Treatment    Pre/Posttreatment Pain Comment --  -HP     Row Name 08/01/22 1754          Plan of Care Review    Outcome Evaluation Pt performed STS and amb 360' with SPC and CGA. Activity limited by SOA and fatigue. Pt educated on NWB following surgery tomorrow and potential use of hemiwalker due to LUE unclear WB status. Continue with PT POC as pt tolerates.  -HP     Row Name 08/01/22 1754          Vital Signs    Pre Systolic BP Rehab --  VSS  -HP     Pre Patient Position Sitting  -HP     Intra Patient Position Standing  -HP     Post Patient Position Sitting  -HP     Row Name 08/01/22 1754          Positioning and Restraints    Pre-Treatment Position sitting in chair/recliner  -HP     Post Treatment Position chair  -HP     In Chair notified nsg;reclined;sitting;call light within reach;encouraged to call for assist;exit alarm on;legs elevated  -           User Key  (r) = Recorded By, (t) = Taken By, (c) = Cosigned By    Initials Name Provider Type    Thelma Kramer PT Physical Therapist               Outcome Measures     Row Name 08/01/22 1757 08/01/22 0820       How much help from  another person do you currently need...    Turning from your back to your side while in flat bed without using bedrails? 4  -HP 4  -TS    Moving from lying on back to sitting on the side of a flat bed without bedrails? 3  -HP 3  -TS    Moving to and from a bed to a chair (including a wheelchair)? 3  -HP 3  -TS    Standing up from a chair using your arms (e.g., wheelchair, bedside chair)? 3  -HP 3  -TS    Climbing 3-5 steps with a railing? 2  -HP 2  -TS    To walk in hospital room? 3  -HP 3  -TS    AM-PAC 6 Clicks Score (PT) 18  -HP 18  -TS    Highest level of mobility 6 --> Walked 10 steps or more  - 6 --> Walked 10 steps or more  -    Row Name 08/01/22 1757 08/01/22 1111       Functional Assessment    Outcome Measure Options AM-PAC 6 Clicks Basic Mobility (PT)  - AM-PAC 6 Clicks Daily Activity (OT)  -MR          User Key  (r) = Recorded By, (t) = Taken By, (c) = Cosigned By    Initials Name Provider Type    TS Kiersten Plummer, RN Registered Nurse     Thelma Rich, PT Physical Therapist    MR Rosalinda Silva, OT Occupational Therapist                             Physical Therapy Education                 Title: PT OT SLP Therapies (Done)     Topic: Physical Therapy (Done)     Point: Mobility training (Done)     Learning Progress Summary           Patient Acceptance, E,D, VU,NR by  at 8/1/2022 1757    Acceptance, E, VU by FW at 7/29/2022 1122                   Point: Home exercise program (Done)     Learning Progress Summary           Patient Acceptance, E,D, VU,NR by  at 8/1/2022 1757    Acceptance, E, VU by FW at 7/29/2022 1122                   Point: Body mechanics (Done)     Learning Progress Summary           Patient Acceptance, E,D, VU,NR by  at 8/1/2022 1757    Acceptance, E, VU by FW at 7/29/2022 1122                   Point: Precautions (Done)     Learning Progress Summary           Patient Acceptance, E,D, VU,NR by  at 8/1/2022 1757    Acceptance, E, VU by FW at 7/29/2022 1122                                User Key     Initials Effective Dates Name Provider Type Discipline    FW 05/05/22 -  Jorge Iyer, PT Physical Therapist PT     06/01/21 -  Thelma Rich PT Physical Therapist PT              PT Recommendation and Plan     Outcome Evaluation: Pt performed STS and amb 360' with SPC and CGA. Activity limited by SOA and fatigue. Pt educated on NWB following surgery tomorrow and potential use of hemiwalker due to LUE unclear WB status. Continue with PT POC as pt tolerates.     Time Calculation:    PT Charges     Row Name 08/01/22 1350             Time Calculation    Start Time 1350  -HP      PT Received On 08/01/22  -HP              Timed Charges    55691 - Gait Training Minutes  15  -HP      87142 - PT Therapeutic Activity Minutes 10  -HP              Total Minutes    Timed Charges Total Minutes 25  -HP       Total Minutes 25  -HP            User Key  (r) = Recorded By, (t) = Taken By, (c) = Cosigned By    Initials Name Provider Type    HP Thelma Rich, PT Physical Therapist              Therapy Charges for Today     Code Description Service Date Service Provider Modifiers Qty    47666624763 HC GAIT TRAINING EA 15 MIN 8/1/2022 Thelma Rich, PT GP 1    20553823549 HC PT THERAPEUTIC ACT EA 15 MIN 8/1/2022 Thelma Rich, PT GP 1          PT G-Codes  Outcome Measure Options: AM-PAC 6 Clicks Basic Mobility (PT)  AM-PAC 6 Clicks Score (PT): 18  AM-PAC 6 Clicks Score (OT): 18    Thelma Rich PT  8/1/2022

## 2022-08-02 ENCOUNTER — ANESTHESIA EVENT CONVERTED (OUTPATIENT)
Dept: ANESTHESIOLOGY | Facility: HOSPITAL | Age: 77
End: 2022-08-02

## 2022-08-02 ENCOUNTER — ANESTHESIA (OUTPATIENT)
Dept: PERIOP | Facility: HOSPITAL | Age: 77
End: 2022-08-02

## 2022-08-02 LAB
ANION GAP SERPL CALCULATED.3IONS-SCNC: 15 MMOL/L (ref 5–15)
BUN SERPL-MCNC: 22 MG/DL (ref 8–23)
BUN/CREAT SERPL: 16.5 (ref 7–25)
CALCIUM SPEC-SCNC: 8.7 MG/DL (ref 8.6–10.5)
CHLORIDE SERPL-SCNC: 100 MMOL/L (ref 98–107)
CO2 SERPL-SCNC: 22 MMOL/L (ref 22–29)
CREAT SERPL-MCNC: 1.33 MG/DL (ref 0.76–1.27)
EGFRCR SERPLBLD CKD-EPI 2021: 55.1 ML/MIN/1.73
GLUCOSE BLDC GLUCOMTR-MCNC: 186 MG/DL (ref 70–130)
GLUCOSE BLDC GLUCOMTR-MCNC: 253 MG/DL (ref 70–130)
GLUCOSE BLDC GLUCOMTR-MCNC: 283 MG/DL (ref 70–130)
GLUCOSE BLDC GLUCOMTR-MCNC: 347 MG/DL (ref 70–130)
GLUCOSE SERPL-MCNC: 288 MG/DL (ref 65–99)
POTASSIUM SERPL-SCNC: 4.2 MMOL/L (ref 3.5–5.2)
QT INTERVAL: 476 MS
QTC INTERVAL: 483 MS
SODIUM SERPL-SCNC: 137 MMOL/L (ref 136–145)

## 2022-08-02 PROCEDURE — 99024 POSTOP FOLLOW-UP VISIT: CPT | Performed by: THORACIC SURGERY (CARDIOTHORACIC VASCULAR SURGERY)

## 2022-08-02 PROCEDURE — 99232 SBSQ HOSP IP/OBS MODERATE 35: CPT | Performed by: INTERNAL MEDICINE

## 2022-08-02 PROCEDURE — 87070 CULTURE OTHR SPECIMN AEROBIC: CPT | Performed by: PHYSICIAN ASSISTANT

## 2022-08-02 PROCEDURE — 88304 TISSUE EXAM BY PATHOLOGIST: CPT | Performed by: THORACIC SURGERY (CARDIOTHORACIC VASCULAR SURGERY)

## 2022-08-02 PROCEDURE — 25010000002 VANCOMYCIN 10 G RECONSTITUTED SOLUTION: Performed by: PHYSICIAN ASSISTANT

## 2022-08-02 PROCEDURE — 93010 ELECTROCARDIOGRAM REPORT: CPT | Performed by: INTERNAL MEDICINE

## 2022-08-02 PROCEDURE — 28810 AMPUTATION TOE & METATARSAL: CPT | Performed by: PHYSICIAN ASSISTANT

## 2022-08-02 PROCEDURE — 28810 AMPUTATION TOE & METATARSAL: CPT | Performed by: THORACIC SURGERY (CARDIOTHORACIC VASCULAR SURGERY)

## 2022-08-02 PROCEDURE — 87147 CULTURE TYPE IMMUNOLOGIC: CPT | Performed by: PHYSICIAN ASSISTANT

## 2022-08-02 PROCEDURE — 87075 CULTR BACTERIA EXCEPT BLOOD: CPT | Performed by: PHYSICIAN ASSISTANT

## 2022-08-02 PROCEDURE — 25010000002 HYDROMORPHONE 1 MG/ML SOLUTION

## 2022-08-02 PROCEDURE — 76942 ECHO GUIDE FOR BIOPSY: CPT | Performed by: THORACIC SURGERY (CARDIOTHORACIC VASCULAR SURGERY)

## 2022-08-02 PROCEDURE — 93005 ELECTROCARDIOGRAM TRACING: CPT | Performed by: INTERNAL MEDICINE

## 2022-08-02 PROCEDURE — 87205 SMEAR GRAM STAIN: CPT | Performed by: PHYSICIAN ASSISTANT

## 2022-08-02 PROCEDURE — 88311 DECALCIFY TISSUE: CPT | Performed by: THORACIC SURGERY (CARDIOTHORACIC VASCULAR SURGERY)

## 2022-08-02 PROCEDURE — 87186 SC STD MICRODIL/AGAR DIL: CPT | Performed by: PHYSICIAN ASSISTANT

## 2022-08-02 PROCEDURE — 25010000002 PROPOFOL 10 MG/ML EMULSION: Performed by: NURSE ANESTHETIST, CERTIFIED REGISTERED

## 2022-08-02 PROCEDURE — 99231 SBSQ HOSP IP/OBS SF/LOW 25: CPT | Performed by: ORTHOPAEDIC SURGERY

## 2022-08-02 PROCEDURE — 88305 TISSUE EXAM BY PATHOLOGIST: CPT | Performed by: THORACIC SURGERY (CARDIOTHORACIC VASCULAR SURGERY)

## 2022-08-02 PROCEDURE — 63710000001 INSULIN LISPRO (HUMAN) PER 5 UNITS: Performed by: PHYSICIAN ASSISTANT

## 2022-08-02 PROCEDURE — 25010000002 FENTANYL CITRATE (PF) 50 MCG/ML SOLUTION: Performed by: NURSE ANESTHETIST, CERTIFIED REGISTERED

## 2022-08-02 PROCEDURE — 82962 GLUCOSE BLOOD TEST: CPT

## 2022-08-02 PROCEDURE — 25010000002 VANCOMYCIN PER 500 MG

## 2022-08-02 PROCEDURE — 25010000002 ONDANSETRON PER 1 MG: Performed by: NURSE ANESTHETIST, CERTIFIED REGISTERED

## 2022-08-02 PROCEDURE — 25010000002 DEXAMETHASONE SODIUM PHOSPHATE 10 MG/ML SOLUTION: Performed by: NURSE ANESTHETIST, CERTIFIED REGISTERED

## 2022-08-02 PROCEDURE — 87077 CULTURE AEROBIC IDENTIFY: CPT | Performed by: PHYSICIAN ASSISTANT

## 2022-08-02 PROCEDURE — 0Y6N0Z9 DETACHMENT AT LEFT FOOT, PARTIAL 1ST RAY, OPEN APPROACH: ICD-10-PCS | Performed by: THORACIC SURGERY (CARDIOTHORACIC VASCULAR SURGERY)

## 2022-08-02 PROCEDURE — 87176 TISSUE HOMOGENIZATION CULTR: CPT | Performed by: PHYSICIAN ASSISTANT

## 2022-08-02 PROCEDURE — 63710000001 INSULIN DETEMIR PER 5 UNITS: Performed by: PHYSICIAN ASSISTANT

## 2022-08-02 PROCEDURE — 25010000002 HEPARIN (PORCINE) PER 1000 UNITS: Performed by: PHYSICIAN ASSISTANT

## 2022-08-02 PROCEDURE — 80048 BASIC METABOLIC PNL TOTAL CA: CPT | Performed by: PHYSICIAN ASSISTANT

## 2022-08-02 RX ORDER — EPHEDRINE SULFATE 50 MG/ML
INJECTION, SOLUTION INTRAVENOUS AS NEEDED
Status: DISCONTINUED | OUTPATIENT
Start: 2022-08-02 | End: 2022-08-02 | Stop reason: SURG

## 2022-08-02 RX ORDER — FENTANYL CITRATE 50 UG/ML
INJECTION, SOLUTION INTRAMUSCULAR; INTRAVENOUS AS NEEDED
Status: DISCONTINUED | OUTPATIENT
Start: 2022-08-02 | End: 2022-08-02 | Stop reason: SURG

## 2022-08-02 RX ORDER — AMOXICILLIN 250 MG
2 CAPSULE ORAL 2 TIMES DAILY PRN
Status: DISCONTINUED | OUTPATIENT
Start: 2022-08-02 | End: 2022-08-26 | Stop reason: HOSPADM

## 2022-08-02 RX ORDER — SODIUM CHLORIDE 450 MG/100ML
35 INJECTION, SOLUTION INTRAVENOUS CONTINUOUS
Status: DISCONTINUED | OUTPATIENT
Start: 2022-08-02 | End: 2022-08-04

## 2022-08-02 RX ORDER — PROPOFOL 10 MG/ML
VIAL (ML) INTRAVENOUS AS NEEDED
Status: DISCONTINUED | OUTPATIENT
Start: 2022-08-02 | End: 2022-08-02 | Stop reason: SURG

## 2022-08-02 RX ORDER — SODIUM CHLORIDE 0.9 % (FLUSH) 0.9 %
10 SYRINGE (ML) INJECTION AS NEEDED
Status: DISCONTINUED | OUTPATIENT
Start: 2022-08-02 | End: 2022-08-02 | Stop reason: HOSPADM

## 2022-08-02 RX ORDER — SODIUM CHLORIDE, SODIUM LACTATE, POTASSIUM CHLORIDE, CALCIUM CHLORIDE 600; 310; 30; 20 MG/100ML; MG/100ML; MG/100ML; MG/100ML
9 INJECTION, SOLUTION INTRAVENOUS CONTINUOUS
Status: DISCONTINUED | OUTPATIENT
Start: 2022-08-02 | End: 2022-08-04

## 2022-08-02 RX ORDER — HYDROMORPHONE HYDROCHLORIDE 1 MG/ML
0.5 INJECTION, SOLUTION INTRAMUSCULAR; INTRAVENOUS; SUBCUTANEOUS
Status: DISCONTINUED | OUTPATIENT
Start: 2022-08-02 | End: 2022-08-02

## 2022-08-02 RX ORDER — LIDOCAINE HYDROCHLORIDE 10 MG/ML
INJECTION, SOLUTION EPIDURAL; INFILTRATION; INTRACAUDAL; PERINEURAL AS NEEDED
Status: DISCONTINUED | OUTPATIENT
Start: 2022-08-02 | End: 2022-08-02 | Stop reason: SURG

## 2022-08-02 RX ORDER — FAMOTIDINE 20 MG/1
20 TABLET, FILM COATED ORAL ONCE
Status: COMPLETED | OUTPATIENT
Start: 2022-08-02 | End: 2022-08-02

## 2022-08-02 RX ORDER — ONDANSETRON 2 MG/ML
INJECTION INTRAMUSCULAR; INTRAVENOUS AS NEEDED
Status: DISCONTINUED | OUTPATIENT
Start: 2022-08-02 | End: 2022-08-02 | Stop reason: SURG

## 2022-08-02 RX ORDER — DEXAMETHASONE SODIUM PHOSPHATE 10 MG/ML
INJECTION, SOLUTION INTRAMUSCULAR; INTRAVENOUS
Status: COMPLETED | OUTPATIENT
Start: 2022-08-02 | End: 2022-08-02

## 2022-08-02 RX ORDER — ONDANSETRON 2 MG/ML
4 INJECTION INTRAMUSCULAR; INTRAVENOUS EVERY 6 HOURS PRN
Status: DISCONTINUED | OUTPATIENT
Start: 2022-08-02 | End: 2022-08-26 | Stop reason: HOSPADM

## 2022-08-02 RX ORDER — BISACODYL 10 MG
10 SUPPOSITORY, RECTAL RECTAL DAILY PRN
Status: DISCONTINUED | OUTPATIENT
Start: 2022-08-02 | End: 2022-08-26 | Stop reason: HOSPADM

## 2022-08-02 RX ORDER — FENTANYL CITRATE 50 UG/ML
50 INJECTION, SOLUTION INTRAMUSCULAR; INTRAVENOUS
Status: DISCONTINUED | OUTPATIENT
Start: 2022-08-02 | End: 2022-08-02

## 2022-08-02 RX ORDER — MIDAZOLAM HYDROCHLORIDE 1 MG/ML
0.5 INJECTION INTRAMUSCULAR; INTRAVENOUS
Status: DISCONTINUED | OUTPATIENT
Start: 2022-08-02 | End: 2022-08-02 | Stop reason: HOSPADM

## 2022-08-02 RX ORDER — ONDANSETRON 4 MG/1
4 TABLET, FILM COATED ORAL EVERY 6 HOURS PRN
Status: DISCONTINUED | OUTPATIENT
Start: 2022-08-02 | End: 2022-08-26 | Stop reason: HOSPADM

## 2022-08-02 RX ORDER — SODIUM CHLORIDE 0.9 % (FLUSH) 0.9 %
10 SYRINGE (ML) INJECTION EVERY 12 HOURS SCHEDULED
Status: DISCONTINUED | OUTPATIENT
Start: 2022-08-02 | End: 2022-08-02 | Stop reason: HOSPADM

## 2022-08-02 RX ORDER — LIDOCAINE HYDROCHLORIDE 10 MG/ML
0.5 INJECTION, SOLUTION EPIDURAL; INFILTRATION; INTRACAUDAL; PERINEURAL ONCE AS NEEDED
Status: DISCONTINUED | OUTPATIENT
Start: 2022-08-02 | End: 2022-08-02 | Stop reason: HOSPADM

## 2022-08-02 RX ORDER — BUPIVACAINE HYDROCHLORIDE 2.5 MG/ML
INJECTION, SOLUTION EPIDURAL; INFILTRATION; INTRACAUDAL
Status: COMPLETED | OUTPATIENT
Start: 2022-08-02 | End: 2022-08-02

## 2022-08-02 RX ADMIN — PROPOFOL 200 MG: 10 INJECTION, EMULSION INTRAVENOUS at 09:53

## 2022-08-02 RX ADMIN — LIDOCAINE HYDROCHLORIDE 50 MG: 10 INJECTION, SOLUTION EPIDURAL; INFILTRATION; INTRACAUDAL; PERINEURAL at 09:53

## 2022-08-02 RX ADMIN — FENTANYL CITRATE 100 MCG: 50 INJECTION, SOLUTION INTRAMUSCULAR; INTRAVENOUS at 09:53

## 2022-08-02 RX ADMIN — Medication 10 ML: at 08:30

## 2022-08-02 RX ADMIN — HEPARIN SODIUM 5000 UNITS: 5000 INJECTION INTRAVENOUS; SUBCUTANEOUS at 14:55

## 2022-08-02 RX ADMIN — SODIUM CHLORIDE 35 ML/HR: 4.5 INJECTION, SOLUTION INTRAVENOUS at 14:55

## 2022-08-02 RX ADMIN — METOPROLOL SUCCINATE 50 MG: 50 TABLET, EXTENDED RELEASE ORAL at 13:18

## 2022-08-02 RX ADMIN — HYDROMORPHONE HYDROCHLORIDE 0.5 MG: 1 INJECTION, SOLUTION INTRAMUSCULAR; INTRAVENOUS; SUBCUTANEOUS at 12:00

## 2022-08-02 RX ADMIN — INSULIN LISPRO 6 UNITS: 100 INJECTION, SOLUTION INTRAVENOUS; SUBCUTANEOUS at 17:54

## 2022-08-02 RX ADMIN — DEXAMETHASONE SODIUM PHOSPHATE 2 MG: 10 INJECTION, SOLUTION INTRAMUSCULAR; INTRAVENOUS at 09:20

## 2022-08-02 RX ADMIN — SODIUM CHLORIDE, POTASSIUM CHLORIDE, SODIUM LACTATE AND CALCIUM CHLORIDE 9 ML/HR: 600; 310; 30; 20 INJECTION, SOLUTION INTRAVENOUS at 08:30

## 2022-08-02 RX ADMIN — INSULIN DETEMIR 25 UNITS: 100 INJECTION, SOLUTION SUBCUTANEOUS at 21:26

## 2022-08-02 RX ADMIN — FAMOTIDINE 20 MG: 20 TABLET, FILM COATED ORAL at 08:40

## 2022-08-02 RX ADMIN — INSULIN LISPRO 3 UNITS: 100 INJECTION, SOLUTION INTRAVENOUS; SUBCUTANEOUS at 17:55

## 2022-08-02 RX ADMIN — Medication 10 ML: at 21:26

## 2022-08-02 RX ADMIN — LISINOPRIL 5 MG: 5 TABLET ORAL at 13:18

## 2022-08-02 RX ADMIN — HEPARIN SODIUM 5000 UNITS: 5000 INJECTION INTRAVENOUS; SUBCUTANEOUS at 21:26

## 2022-08-02 RX ADMIN — BUPIVACAINE HYDROCHLORIDE 30 ML: 2.5 INJECTION, SOLUTION EPIDURAL; INFILTRATION; INTRACAUDAL; PERINEURAL at 09:20

## 2022-08-02 RX ADMIN — VANCOMYCIN HYDROCHLORIDE 1500 MG: 10 INJECTION, POWDER, LYOPHILIZED, FOR SOLUTION INTRAVENOUS at 21:30

## 2022-08-02 RX ADMIN — INSULIN LISPRO 8 UNITS: 100 INJECTION, SOLUTION INTRAVENOUS; SUBCUTANEOUS at 21:26

## 2022-08-02 RX ADMIN — AMLODIPINE BESYLATE 10 MG: 10 TABLET ORAL at 13:17

## 2022-08-02 RX ADMIN — VANCOMYCIN HYDROCHLORIDE 750 MG: 750 INJECTION, SOLUTION INTRAVENOUS at 09:34

## 2022-08-02 RX ADMIN — HYDROCODONE BITARTRATE AND ACETAMINOPHEN 1 TABLET: 5; 325 TABLET ORAL at 13:18

## 2022-08-02 RX ADMIN — ONDANSETRON 4 MG: 2 INJECTION INTRAMUSCULAR; INTRAVENOUS at 10:43

## 2022-08-02 RX ADMIN — INSULIN LISPRO 3 UNITS: 100 INJECTION, SOLUTION INTRAVENOUS; SUBCUTANEOUS at 13:29

## 2022-08-02 RX ADMIN — INSULIN LISPRO 6 UNITS: 100 INJECTION, SOLUTION INTRAVENOUS; SUBCUTANEOUS at 13:28

## 2022-08-02 RX ADMIN — EPHEDRINE SULFATE 15 MG: 50 INJECTION INTRAVENOUS at 10:00

## 2022-08-02 RX ADMIN — DEXAMETHASONE SODIUM PHOSPHATE 8 MG: 10 INJECTION, SOLUTION INTRAMUSCULAR; INTRAVENOUS at 09:53

## 2022-08-02 NOTE — PLAN OF CARE
Goal Outcome Evaluation:  Plan of Care Reviewed With: patient           Outcome Evaluation: VSS. Alert, oriented x4. PO pain medication given. Voiding per urinal. Heel boots in place. Will continue to monitor.

## 2022-08-02 NOTE — ANESTHESIA POSTPROCEDURE EVALUATION
Patient: Jermaine Singh    Procedure Summary     Date: 08/02/22 Room / Location:  [facility] [address] [place] [address] [facility]    Anesthesia Start: 0946 Anesthesia Stop:     Procedure: LEFT GREAT TOE AMPUTATION (Left Toes) Diagnosis:       Cellulitis of left foot      (Cellulitis of left foot [L03.116])    Surgeons: Gopal Márquez MD Provider: Sage Wilkinson Jr., MD    Anesthesia Type: general with block ASA Status: 3          Anesthesia Type: general with block    Vitals  No vitals data found for the desired time range.          Post Anesthesia Care and Evaluation    Patient location during evaluation: PACU  Patient participation: complete - patient participated  Level of consciousness: awake and responsive to verbal stimuli  Pain score: 2  Pain management: adequate    Airway patency: patent  Anesthetic complications: No anesthetic complications    Cardiovascular status: acceptable  Respiratory status: acceptable  Hydration status: acceptable    Comments: Pt awake and responsive. SV. VSS. Report to RN. Patient Vitals in the past 24 hrs:  08/02/22 0835, BP:154/90, Temp:98.7 °F (37.1 °C), Temp src:Temporal, Pulse:67, Resp:18, SpO2:95 %  08/02/22 0750, BP:170/90, Temp:98.2 °F (36.8 °C), Temp src:Oral, Pulse:65, Resp:18, SpO2:94 %  08/02/22 0500, Pulse:79, SpO2:94 %  08/02/22 0450, BP:154/81, Temp:98.1 °F (36.7 °C), Temp src:Oral, Pulse:63, Resp:18, SpO2:97 %  08/02/22 0300, Pulse:60, SpO2:94 %  08/02/22 0100, Pulse:61, SpO2:95 %  08/02/22 0055, BP:142/78, Temp:98.4 °F (36.9 °C), Temp src:Oral, Pulse:62, Resp:18, SpO2:97 %  08/01/22 2300, Pulse:66, SpO2:97 %  08/01/22 2100, Pulse:66  08/01/22 2053, BP:155/81, Temp:98 °F (36.7 °C), Temp src:Oral, Pulse:65, Resp:18  08/01/22 1900, Pulse:65  08/01/22 1541, BP:171/91, Temp:98.2 °F (36.8 °C), Temp src:Oral, Pulse:72, Resp:16  08/01/22 1113, BP:154/79, Temp:97.6 °F (36.4 °C), Temp src:Oral, Pulse:67, Resp:16  133/78. p 72. r 16. t 98.1

## 2022-08-02 NOTE — PROGRESS NOTES
[facility] Medicine Services  PROGRESS NOTE    Patient Name: Jermaine Singh  : 1945  MRN: [id number]    Date of Admission: 2022  Primary Care Physician: Farooq Painter MD    Subjective   Subjective     CC:  S/p fall, left toe wound, shoulder dislocation     HPI:  Surgery notes reviewed from amputation today.  No immediate complications.  Infectious disease note reviewed case management note reviewed from yesterday.  Talking with Mr. Singh he is feeling well postop.  He is conversant he is sitting in the bed he is eating.  Not in any pain.  Reports normal bowel and bladder function.  No chest pain or shortness of breath.    ROS:  Gen- No fevers, chills  CV- No chest pain, palpitations  Resp- No cough, dyspnea  GI- No N/V/D, abd pain        Objective   Objective     Vital Signs:   Temp:  [97.3 °F (36.3 °C)-98.7 °F (37.1 °C)] 98.4 °F (36.9 °C)  Heart Rate:  [60-79] 77  Resp:  [16-18] 16  BP: (124-170)/(70-90) 124/70  Flow (L/min):  [2] 2     Physical Exam:  Constitutional: No acute distress, awake, alert  HENT: NCAT, mucous membranes moist  Respiratory: Clear to auscultation bilaterally, respiratory effort normal room air   Cardiovascular: RRR, no murmurs, rubs, or gallops  Gastrointestinal: Positive bowel sounds, soft, nontender, nondistended  Musculoskeletal: left shoulder in sling, mild LE edema   Psychiatric: Appropriate affect, cooperative  Neurologic: Oriented x 3, strength symmetric in all extremities, Cranial Nerves grossly intact to confrontation, speech clear  Skin: Left foot is wrapped I did not unwrap it I did look at his gluteal region he has erythema in the gluteal crease.  No ulceration.      Results Reviewed:  LAB RESULTS:      Lab 22  0918 22  0533 22  0348 22  0004 22   WBC 7.81 8.39 10.87*  --  12.22*   HEMOGLOBIN 13.4 13.2 12.9*  --  14.2   HEMATOCRIT 40.0 39.0 37.3*  --  41.2   PLATELETS 264 233 237  --  239   NEUTROS ABS   --  5.24 8.15*  --  9.95*   IMMATURE GRANS (ABS)  --  0.02 0.03  --  0.04   LYMPHS ABS  --  1.88 1.57  --  1.09   MONOS ABS  --  0.82 0.97*  --  0.94*   EOS ABS  --  0.36 0.11  --  0.14   MCV 87.1 87.1 84.8  --  86.0   SED RATE  --   --   --   --  43*   CRP  --   --   --   --  2.33*   PROCALCITONIN  --   --   --   --  0.08   LACTATE  --   --  1.5 2.2* 2.6*         Lab 08/02/22  1416 08/01/22 2129 07/31/22  0820 07/30/22  0635 07/29/22  0533 07/28/22  0348   SODIUM 137  --  137 138 138 144   POTASSIUM 4.2 4.4 4.0 4.0 4.2 4.9   CHLORIDE 100  --  103 104 104 108*   CO2 22.0  --  26.0 28.0 27.0 31.0*   ANION GAP 15.0  --  8.0 6.0 7.0 5.0   BUN 22  --  15 15 18 20   CREATININE 1.33*  --  1.18 1.10 1.14 0.93   EGFR 55.1*  --  63.6 69.1 66.2 84.6   GLUCOSE 288*  --  233* 220* 253* 192*   CALCIUM 8.7  --  8.7 8.6 8.8 9.2   MAGNESIUM  --   --   --   --   --  2.0   HEMOGLOBIN A1C  --   --   --   --   --  9.00*         Lab 07/27/22 2041   TOTAL PROTEIN 7.1   ALBUMIN 3.70   GLOBULIN 3.4   ALT (SGPT) 19   AST (SGOT) 15   BILIRUBIN 0.2   ALK PHOS 81                     Brief Urine Lab Results  (Last result in the past 365 days)      Color   Clarity   Blood   Leuk Est   Nitrite   Protein   CREAT   Urine HCG        02/27/22 1321 Dark Yellow   Clear   Negative   Negative   Negative   2+                 Microbiology Results Abnormal     Procedure Component Value - Date/Time    Blood Culture - Blood, Hand, Left [[id number]]  (Normal) Collected: 07/27/22 2040    Lab Status: Final result Specimen: Blood from Hand, Left Updated: 08/01/22 2132     Blood Culture No growth at 5 days    Blood Culture - Blood, Hand, Right [[id number]]  (Normal) Collected: 07/27/22 2030    Lab Status: Final result Specimen: Blood from Hand, Right Updated: 08/01/22 2132     Blood Culture No growth at 5 days    COVID PRE-OP / PRE-PROCEDURE SCREENING ORDER (NO ISOLATION) - Swab, Nasopharynx [[id number]]  (Normal) Collected: 07/27/22 2246    Lab Status: Final  result Specimen: Swab from Nasopharynx Updated: 07/27/22 2357    Narrative:      The following orders were created for panel order COVID PRE-OP / PRE-PROCEDURE SCREENING ORDER (NO ISOLATION) - Swab, Nasopharynx.  Procedure                               Abnormality         Status                     ---------                               -----------         ------                     COVID-19 and FLU A/B PCR...[[id number]]  Normal              Final result                 Please view results for these tests on the individual orders.    COVID-19 and FLU A/B PCR - Swab, Nasopharynx [[id number]]  (Normal) Collected: 07/27/22 2246    Lab Status: Final result Specimen: Swab from Nasopharynx Updated: 07/27/22 2357     COVID19 Not Detected     Influenza A PCR Not Detected     Influenza B PCR Not Detected    Narrative:      Fact sheet for providers: https://www.fda.gov/media/704277/download    Fact sheet for patients: https://www.fda.gov/media/946685/download    Test performed by PCR.          Peripheral Block    Result Date: 8/2/2022  Tori Fleming CRNA     8/2/2022  9:25 AM Peripheral Block Patient reassessed immediately prior to procedure Patient location during procedure: pre-op Reason for block: at surgeon's request and post-op pain management Performed by CRNA/CAA: Tori Fleming CRNA Assisted by: Hermelinda Shanks RN Preanesthetic Checklist Completed: patient identified, IV checked, site marked, risks and benefits discussed, surgical consent, monitors and equipment checked, pre-op evaluation and timeout performed Prep: Sterile barriers:cap, gloves, mask, sterile barriers and washed/disinfected hands Prep: ChloraPrep Patient monitoring: blood pressure monitoring, continuous pulse oximetry and EKG Procedure Sedation: yes Performed under: local infiltration Guidance:ultrasound guided Images:still images obtained, printed/placed on chart Laterality:left Block Type:popliteal Injection Technique:single-shot  Needle Type:echogenic Needle Gauge:18 G Resistance on Injection: none Catheter Size:20 G Medications Used: bupivacaine PF (MARCAINE) 0.25 % injection, 30 mL dexamethasone sodium phosphate injection, 2 mg Med administered at 8/2/2022 9:20 AM Post Assessment Injection Assessment: negative aspiration for heme, no paresthesia on injection and incremental injection Patient Tolerance:comfortable throughout block Complications:no Additional Notes Procedure:                                                     The pt was placed in  lateral position.  The Insertion site was  prepped and Draped in sterile fashion.  The pt was anesthetized with  IV Sedation( see meds).  Skin and cutaneous tissue was infiltrated and anesthetized with 1% Lidocaine 3 mls via a 25g needle.  A BBraun 4 inch 18g echogenic needle was then  inserted approximately 3 cm proximal to the popliteal fossa at the lateral mid biceps femoris and advanced In-plane with Ultrasound guidance.  Normal Saline PSF was utilized for hydrodissection of tissue.  The popliteal artery was visualized and the common peroneal and tibial bifurcation was located.  LA injection spread was visualized, injection was incremental 1-5ml, injection pressure was normal or little, no intraneural injection, no vascular injection.  .  A BBraun 20g wire stylet catheter was placed via the needle with ultrasound visualization and confirmation with NS fluid bolus. The labeled catheter was then secured at insertion site with exofin tissue adhesive, benzoin, steristrips  and a CHG transparent dressing was placed over. Thank you           I have reviewed the medications:  Scheduled Meds:amLODIPine, 10 mg, Oral, Daily  aspirin, 81 mg, Oral, Daily  cetirizine, 5 mg, Oral, Daily  heparin (porcine), 5,000 Units, Subcutaneous, Q8H  insulin detemir, 25 Units, Subcutaneous, Nightly  insulin lispro, 0-9 Units, Subcutaneous, 4x Daily With Meals & Nightly  Insulin Lispro, 3 Units, Subcutaneous, TID With  Meals  levothyroxine, 50 mcg, Oral, Q AM  lisinopril, 5 mg, Oral, Q24H  metoprolol succinate XL, 50 mg, Oral, Q24H  Propofol, 200 mg, Intravenous, Once  sodium chloride, 10 mL, Intravenous, Q12H  vancomycin, 15 mg/kg, Intravenous, Once  vitamin D3, 5,000 Units, Oral, Daily      Continuous Infusions:lactated ringers, 9 mL/hr, Last Rate: 9 mL/hr (08/02/22 0946)  Pharmacy to dose vancomycin,   sodium chloride, 35 mL/hr, Last Rate: 35 mL/hr (08/02/22 1455)      PRN Meds:.•  acetaminophen **OR** acetaminophen **OR** acetaminophen  •  bisacodyl  •  dextrose  •  dextrose  •  glucagon (human recombinant)  •  HYDROcodone-acetaminophen  •  HYDROmorphone  •  ondansetron **OR** ondansetron  •  Pharmacy to dose vancomycin  •  sennosides-docusate  •  [COMPLETED] Insert peripheral IV **AND** sodium chloride  •  sodium chloride    Assessment & Plan   Assessment & Plan     Active Hospital Problems    Diagnosis  POA   • Proximal phalanx fracture of the second digit extending into the second metatarsal joint [S92.919A]  Unknown   • Dislocation of left shoulder joint, initial encounter [S43.005A]  Yes   • Cellulitis in diabetic foot ([facility]) [E11.628, L03.119]  Yes   • Fall [W19.XXXA]  Yes   • Sepsis ([facility]) [A41.9]  Yes   • Leukocytosis [D72.829]  Yes   • Lactic acidosis [E87.2]  Yes   • Elevated C-reactive protein (CRP) [R79.82]  Yes   • Elevated sed rate (elev SR) [R70.0]  Yes   • SSS (sick sinus syndrome) ([facility]) [I49.5]  Yes   • Hyperlipidemia [E78.5]  Yes   • Hypertension [I10]  Yes   • S/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 Au [Z95.1]  Not Applicable   • Hypothyroidism (acquired) [E03.9]  Yes   • Type 2 diabetes mellitus (HCC) [E11.9]  Yes      Resolved Hospital Problems   No resolved problems to display.        Brief Hospital Course to date:  Jermaine Singh is a 77 y.o. male with PMH significant for CAD s/p CABG x3, DM 2, hypothyroid, HTN, HLD, PVD s/p stent in RLE, who presents to the ED with complaint of fall and left shoulder pain  is found to have concern for left shoulder dislocation that was reduced while in the ED as well as left great toe failing outpatient antibiotic.      Plan was partially entered by my partner and I have reviewed and updated as appropriate on 7/31/22     Sepsis: Leukocytosis, lactic acidosis, elevated CRP/sed rate.  Source: Left great toe wound with cellulitis  PVD  - Concerning for decreased blood flow  - Wound consult  - Request records from Dr. Joseph podiatry  - X-ray negative for osteo-, does note fracture  - Unable to do MRI secondary to PPM  - On doxycycline outpatient, hold for now  - Vancomycin and Zosyn started in the ED, will continue per ID recs  - ID following  -Arterial duplex of the left preliminary report reviewed awaiting vascular read. will consult cardiology for advice on re vascularization   -- Bone scan first digit compatible with osteomyelitis and second digit felt to be less likely   -Amputation 8-2-22     Fall  Left shoulder dislocation  Acute fracture proximal phalanx of the second digit extending into the second metatarsophalangeal joint  - Successful reduction while in the ED  - Ortho consulted- recommends non operative treatment and sling for comfort. PT for range of motion exercises, avoiding abduction and external rotation. F/u outpatient w Dr Gipson in 2 to 3 wks  - As needed pain control  - Fall precautions        Diabetes mellitus 2  - FSBG ACHS blood sugars  - Is on Levemir 25 units 3 units Premeal  - Getting basal bolus insulin right now.  Last blood sugar was elevated at 288 postoperatively.  I am hesitant to increase the insulin right now though as I expect that the stress of surgery will wane as the time from surgery increases.  - Hemoglobin A1c is 9     Hypertension  CAD  SSS  - S/p CABG  - S/p PPM  - increase amlodipine  - Changed metoprolol to carvedilol metoprolol to carvedilol.  -Barely hypertensive for the majority of the day with systolics in the low 140s.  We will continue  to monitor this for now 8-2-22     Hypothyroid  - Continue levothyroxine    Expected Discharge Location and Transportation: Await PT recommendations possibly rehab  Expected Discharge Date: 8/5/2022    DVT prophylaxis:  Medical and mechanical DVT prophylaxis orders are present.     AM-PAC 6 Clicks Score (PT): 18 (08/01/22 8807)    CODE STATUS:   Code Status and Medical Interventions:   Ordered at: 07/28/22 0000     Code Status (Patient has no pulse and is not breathing):    CPR (Attempt to Resuscitate)     Medical Interventions (Patient has pulse or is breathing):    Full Support       Peter Braga DO  08/02/22

## 2022-08-02 NOTE — ANESTHESIA PREPROCEDURE EVALUATION
Anesthesia Evaluation     Patient summary reviewed and Nursing notes reviewed   no history of anesthetic complications:  NPO Solid Status: > 8 hours  NPO Liquid Status: > 2 hours           Airway   Dental      Pulmonary    (+) asthma,  Cardiovascular     ECG reviewed    (+) pacemaker pacemaker interrogated 3-6 months ago, hypertension, CABG >6 Months, PVD (s/p stent in RLE), hyperlipidemia,       Neuro/Psych  GI/Hepatic/Renal/Endo    (+)   diabetes mellitus, thyroid problem hypothyroidism    Musculoskeletal         ROS comment: Left shoulder dislocation  Acute fracture proximal phalanx of the second digit extending into the second metatarsophalangeal joint  - Successful reduction while in the ED  Abdominal    Substance History      OB/GYN          Other                        Anesthesia Plan    ASA 3     general with block     intravenous induction     Anesthetic plan, risks, benefits, and alternatives have been provided, discussed and informed consent has been obtained with: patient.    Plan discussed with CRNA.        CODE STATUS:    Code Status (Patient has no pulse and is not breathing): CPR (Attempt to Resuscitate)  Medical Interventions (Patient has pulse or is breathing): Full Support

## 2022-08-02 NOTE — ANESTHESIA PROCEDURE NOTES
Peripheral Block      Patient reassessed immediately prior to procedure    Patient location during procedure: pre-op  Reason for block: at surgeon's request and post-op pain management  Performed by  CRNA/CAA: Tori Fleming CRNA  Assisted by: Hermelinda Shanks RN  Preanesthetic Checklist  Completed: patient identified, IV checked, site marked, risks and benefits discussed, surgical consent, monitors and equipment checked, pre-op evaluation and timeout performed  Prep:  Sterile barriers:cap, gloves, mask, sterile barriers and washed/disinfected hands  Prep: ChloraPrep  Patient monitoring: blood pressure monitoring, continuous pulse oximetry and EKG  Procedure    Sedation: yes  Performed under: local infiltration  Guidance:ultrasound guided  Images:still images obtained, printed/placed on chart    Laterality:left  Block Type:popliteal  Injection Technique:single-shot  Needle Type:echogenic  Needle Gauge:18 G  Resistance on Injection: none  Catheter Size:20 G    Medications Used: bupivacaine PF (MARCAINE) 0.25 % injection, 30 mL  dexamethasone sodium phosphate injection, 2 mg  Med administered at 8/2/2022 9:20 AM      Post Assessment  Injection Assessment: negative aspiration for heme, no paresthesia on injection and incremental injection  Patient Tolerance:comfortable throughout block  Complications:no  Additional Notes  Procedure:                                                         The pt was placed in  lateral position.  The Insertion site was  prepped and Draped in sterile fashion.  The pt was anesthetized with  IV Sedation( see meds).  Skin and cutaneous tissue was infiltrated and anesthetized with 1% Lidocaine 3 mls via a 25g needle.  A BBraun 4 inch 18g echogenic needle was then  inserted approximately 3 cm proximal to the popliteal fossa at the lateral mid biceps femoris and advanced In-plane with Ultrasound guidance.  Normal Saline PSF was utilized for hydrodissection of tissue.  The popliteal artery  was visualized and the common peroneal and tibial bifurcation was located.  LA injection spread was visualized, injection was incremental 1-5ml, injection pressure was normal or little, no intraneural injection, no vascular injection.  .  A BBraun 20g wire stylet catheter was placed via the needle with ultrasound visualization and confirmation with NS fluid bolus. The labeled catheter was then secured at insertion site with exofin tissue adhesive, benzoin, steristrips  and a CHG transparent dressing was placed over. Thank you

## 2022-08-02 NOTE — PROGRESS NOTES
Cardiothoracic Surgery Progress Note      POD #:     LOS: 6 days      Subjective: I briefly discussed the situation with Dr. Lincoln Apodaca.  He feels as I do that we should proceed ahead with left great toe amputation.  He does not think any further endovascular procedure can be done to improve arterial flow this left lower extremity.  I discussed this with the patient today he seems agreeable to proceed ahead.  I discussed the risk and benefits of the surgical procedure with him to include bleeding, stroke, heart attack, death, possible postop infection, and possible need for amputation a higher level due to infection and/or nonhealing of the toe amputation site as well as possible postop phantom pain.    Objective:  Vital Signs  Temp:  [97.6 °F (36.4 °C)-98.4 °F (36.9 °C)] 98.1 °F (36.7 °C)  Heart Rate:  [60-79] 79  Resp:  [16-18] 18  BP: (142-179)/(78-99) 154/81    Physical Exam:   General Appearance: Awake and alert   Lungs:   Heart:   Skin: Left great toe has dry eschar on the medial tuft and plantar surface tuft of the great toe.   Incision:     Results:  Results from last 7 days   Lab Units 08/01/22  0918   WBC 10*3/mm3 7.81   HEMOGLOBIN g/dL 13.4   HEMATOCRIT % 40.0   PLATELETS 10*3/mm3 264     Results from last 7 days   Lab Units 08/01/22  2129 07/31/22  0820   SODIUM mmol/L  --  137   POTASSIUM mmol/L 4.4 4.0   CHLORIDE mmol/L  --  103   CO2 mmol/L  --  26.0   BUN mg/dL  --  15   CREATININE mg/dL  --  1.18   GLUCOSE mg/dL  --  233*   CALCIUM mg/dL  --  8.7         Assessment: #1.  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  Status post remote coronary bypass grafting  3.  Postop pacemaker placement for sick sinus syndrome  4.  Recent fall with left shoulder dislocation which was reduced in the emergency department  5.  Prior endovascular procedure to the right lower extremity per Dr. Hernandez in 2019      Plan: Left  great toe amputation.  Patient agrees to proceed ahead.  I explained the risks as noted above to him.  He appears to understands and agrees for me to proceed ahead      Gopal Márquez MD - 08/02/22 - 05:55 EDT

## 2022-08-02 NOTE — PROGRESS NOTES
"CHIEF COMPLAINT: Follow-up left shoulder dislocation    SUBJECTIVE  Patient complains of shoulder stiffness.  Otherwise doing well.  Plan for great toe amputation by Dr. Márquez.    PHYSICAL THERAPY PROGRESS  Outcome Evaluation: Pt performed STS and amb 360' with SPC and CGA. Activity limited by SOA and fatigue. Pt educated on NWB following surgery tomorrow and potential use of hemiwalker due to LUE unclear WB status. Continue with PT POC as pt tolerates. (22 175)     OBJECTIVE  Temp (24hrs), Av.1 °F (36.7 °C), Min:97.6 °F (36.4 °C), Max:98.4 °F (36.9 °C)    Blood pressure 154/81, pulse 79, temperature 98.1 °F (36.7 °C), temperature source Oral, resp. rate 18, height 190.5 cm (75\"), weight 98 kg (216 lb), SpO2 94 %.    Lab Results (last 24 hours)     Procedure Component Value Units Date/Time    Potassium [[id number]]  (Normal) Collected: 22    Specimen: Blood Updated: 22     Potassium 4.4 mmol/L      Comment: Slight hemolysis detected by analyzer. Results may be affected.       Blood Culture - Blood, Hand, Left [[id number]]  (Normal) Collected: 22    Specimen: Blood from Hand, Left Updated: 22     Blood Culture No growth at 5 days    Blood Culture - Blood, Hand, Right [[id number]]  (Normal) Collected: 22    Specimen: Blood from Hand, Right Updated: 22     Blood Culture No growth at 5 days    POC Glucose Once [[id number]]  (Abnormal) Collected: 22    Specimen: Blood Updated: 22     Glucose 156 mg/dL      Comment: Meter: [id number] : [id number] Colette Caldwell       POC Glucose Once [[id number]]  (Abnormal) Collected: 22 1639    Specimen: Blood Updated: 22 1646     Glucose 256 mg/dL      Comment: Meter: [id number] : [id number] Jane Renee       POC Glucose Once [[id number]]  (Abnormal) Collected: 22 1118    Specimen: Blood Updated: 22 1121     Glucose 180 mg/dL      Comment: Meter: [id number] " : [id number] Jane Renee       Vancomycin, Random [[id number]]  (Normal) Collected: 08/01/22 0918    Specimen: Blood Updated: 08/01/22 1045     Vancomycin Random 12.80 mcg/mL     Narrative:      Therapeutic Ranges for Vancomycin    Vancomycin Random   5.0-40.0 mcg/mL  Vancomycin Trough   5.0-20.0 mcg/mL  Vancomycin Peak     20.0-40.0 mcg/mL    CBC (No Diff) [[id number]]  (Normal) Collected: 08/01/22 0918    Specimen: Blood Updated: 08/01/22 1007     WBC 7.81 10*3/mm3      RBC 4.59 10*6/mm3      Hemoglobin 13.4 g/dL      Hematocrit 40.0 %      MCV 87.1 fL      MCH 29.2 pg      MCHC 33.5 g/dL      RDW 12.5 %      RDW-SD 39.8 fl      MPV 10.9 fL      Platelets 264 10*3/mm3     POC Glucose Once [[id number]]  (Abnormal) Collected: 08/01/22 0800    Specimen: Blood Updated: 08/01/22 0802     Glucose 176 mg/dL      Comment: Meter: [id number] : [id number] Jane Renee               PHYSICAL EXAM  Left upper extremity: Sling in place.  Shoulder range of motion diminished secondary to pain/stiffness.  Intact EPL, FPL, EDC, FDP, FDS, interosseous, wrist flexion, wrist extension, biceps, triceps, deltoid. Sensation intact to light touch to median, radial, ulnar, and axillary nerves. 2+ palpable radial pulse.         Type 2 diabetes mellitus (HCC)    Hypertension    Hyperlipidemia    Hypothyroidism (acquired)    S/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 Au    SSS (sick sinus syndrome) ([facility])    Dislocation of left shoulder joint, initial encounter    Cellulitis in diabetic foot ([facility])    Fall    Sepsis ([facility])    Leukocytosis    Lactic acidosis    Elevated C-reactive protein (CRP)    Elevated sed rate (elev SR)    Proximal phalanx fracture of the second digit extending into the second metatarsal joint      PLAN / DISPOSITION:  Left anterior shoulder dislocation status post reduction    Continue sling for comfort.  Shoulder range of motion as tolerated, avoiding abduction and external rotation  PT OT for mobilization  Follow-up 3  weeks in clinic for reevaluation.    No future appointments.    Eyad Gipson MD  08/02/22  07:11 EDT

## 2022-08-02 NOTE — OP NOTE
OP NOTE    Patient Name:  Jermaine Singh    Date of Surgery: August 2, 2022      Pre-op Diagnosis: Left great toe diabetic gangrene with severe peripheral vascular disease    Post-op Diagnosis: Same    Surgeon(s):  Gopal Márquez MD    Assistant(s):   Assistant: Nishant Allan PA was responsible for performing the following activities: Retraction, Suction, Irrigation, Suturing, Closing and Placing Dressing and their skilled assistance was necessary for the success of this case.    Anesthesia: Left single shot popliteal nerve block under ultrasonic guidance per anesthesia followed by intravenous induction of general LMA anesthesia    Indications: Patient is a longstanding diabetic with severe peripheral neuropathy with known severe peripheral vascular disease who presented to the emergency department on July 28 after sustaining a fall in which he dislocated his left shoulder the shoulder was reduced but he was noted to have a ulceration of his left great toe which she stated has been present for over 8 days.  He stated that ulceration developed after he got a blister after wearing new shoes.  He was seen by podiatry was placed on oral doxycycline..  He had an elevated white blood cell count in the emergency department and was admitted for this ulceration.  MRI was performed in the emergency department which revealed osteomyelitis of the distal phalanx of this left great toe.  He was evaluated by Dr. Apodaca of cardiology/endovascular and was not felt to have any reconstructable disease in this left lower extremity and felt as though amputation could be performed to this left great toe.  Of note the fact patient has strong crisp Doppler posterior tib and dorsalis pedis pulse in this left foot.  Patient agreed for me to proceed ahead at this time knowing full well the risk involved to include bleeding, stroke, heart attack, death, possible postop phantom pain and possible need for amputation however in the future due  to infection and/or ischemia of the amputation site.    Procedure(s): Left great toe transmetatarsal amputation through the distal third of the metatarsal bone with primary closure.    Patient underwent a left popliteal nerve single shot nerve block under ultrasound guidance per anesthesia in the preop.  He was taken the operating room and underwent intravenous induction of general LMA anesthesia difficulty.  Timeout was called to verify the procedure be done left great toe transmetatarsal amputation and the left foot left lower extremity to the knee was prepped circumferentially with ChloraPrep and sterilely draped.  Incision was made across the distal third of the metatarsal bone of the left great toe on the dorsum of the foot extending around the base of the left toe circumferentially.  This incision was carried into the deep soft tissue with the Bovie cautery and the toe was disarticulated at the metatarsal phalangeal joint space.  The dissection was carried along the distal third of the first metatarsal bone  from the flexor and extensor tendons with the Bovie cautery the bone was then transected in oblique fashion with the bone power saw and removed in the operative field.  The flexor extensor tendons of this left great toe was dissected free in the soft tissues and was transected at the most proximal portion of the surgical wound.  The volar plate likewise was removed with dissection with the Bovie cautery.  It should be noted there was brisk arterial bleeding within the soft tissue during this dissection.  The bleeding was controlled with Bovie cautery.  After assurance of hemostasis this open surgical wound was irrigated with 4 and 50 mL of Irrisept orthopedic solution.  After assurance of hemostasis the incision was closed with a deep layer soft tissue of 3-0 Vicryl sutures interrupted the skin was reapproximated with interrupted 2 oh simple nylon sutures.  The incision was then covered with a 4 x  4 Xeroform gauze followed by 4 x 4 fluffs between the toes and on top of Xeroform followed by Kerlix wrap to hold 4 x 4's in place and a #8 tube net dressing to hold the Kerlix in place.  Patient was awakened from the general LMA anesthesia and taken recovery stable vital signs sponge needle count were correct x2.  A section of the necrotic soft tissue of the left great toe was sampled and sent for aerobic and anaerobic culture and gram stain.  The distal third of the great toe metatarsal bone was sent for pathologic review and the left great toe with its tendinous attachments and necrotic soft tissue was sent for pathological review.    Estimated Blood Loss: Approximately 20 mL.    Findings: Excellent arterial bleeding from the digital arteries to this left great toe.  There was soft tissue necrosis in the distal phalanx soft tissues i.e. tuft of the great toe on the plantar surface    Complications: No immediate complications occurred      Gopal Márquez MD     Date: 8/2/2022 Time: 11:19 EDT

## 2022-08-02 NOTE — PLAN OF CARE
Goal Outcome Evaluation:   Patient has been resting comfortably with no acute signs of distress. VSS. Pain well controlled on prn pain medication. NPO since midnight and CHG bath given. Anticipating surgery this morning for amputation of L great toe. Will continue to monitor as patient progresses.

## 2022-08-02 NOTE — ANESTHESIA PROCEDURE NOTES
Airway  Urgency: elective    Date/Time: 8/2/2022 9:53 AM  Airway not difficult    General Information and Staff    Patient location during procedure: OR  CRNA/CAA: Golden Redd CRNA    Indications and Patient Condition  Indications for airway management: airway protection    Preoxygenated: yes  MILS not maintained throughout  Mask difficulty assessment: 1 - vent by mask    Final Airway Details  Final airway type: supraglottic airway      Successful airway: I-gel  Size 4    Number of attempts at approach: 1  Assessment: lips, teeth, and gum same as pre-op and atraumatic intubation    Additional Comments  Negative epigastric sounds, Breath sound equal bilaterally with symmetric chest rise and fall

## 2022-08-02 NOTE — PROGRESS NOTES
Jermaine JIMENES Francisco  1945  [id number]    Date of Consult: 8/2/2022    Admission Date: 7/27/2022      Requesting Provider: No ref. provider found  Evaluating Physician: Sage Braga MD    Reason for Consultation: Evaluation of toe wound    History of present illness:    Patient is a 77 y.o. male with coronary disease history of CABG diabetes mellitus type 2 hypothyroidism hypertension hyperlipidemia prevascular disease presents the emergency room after having a fall and injuring left shoulder patient tripped while walking his dog.  Coincidentally patient wears an orthotic shoe as recommended by a podiatrist has been on oral doxycycline for a left toe wound we are asked to evaluate this patient to start on broad-spectrum antibiotics occluding vancomycin and Zosyn.  X-ray of left foot showed soft tissue swelling in the forefoot without obvious fracture or osteomyelitis.    Has history of pacemaker    History of vascular stent placed in right leg.    7/29/2022; is having bone scan today denies fevers rash sore throat or diarrhea    7/30/22:  Bone scan compatible with osteomyelitis distal phalanx of first digit, acute fracture 2nd digit.  Afebrile. Blood cultures no growth to date.     7/31/22:  Afebrile.   Dr. Márquez consulting Dr. Apodaca for vascular studies left lower extremity.   No n/v/d.  Complains of shoulder pain.     8/2/22; doing well; had surgery today; toe amputation, no fever, rash, sore throat    Past Medical History:   Diagnosis Date   • Asthma    • Coronary artery disease    • Diabetes mellitus (HCC) 2000    started on inuslin 12/2018; started on po meds in 2000; checking blood sugars daily    • Disease of thyroid gland     po meds daily for hypothyroidism    • History of fracture as a child     rt leg- severe    • Hyperlipidemia    • Hypertension    • Hypothyroidism    • Peripheral neuropathy    • Peripheral vascular disease (HCC)     s/p angiogram 2/19-needs stent in left leg    •  RBBB    • Right knee pain    • Vitamin D deficiency        Past Surgical History:   Procedure Laterality Date   • APPENDECTOMY     • CARDIAC CATHETERIZATION N/A 2/14/2019    Procedure: Left Heart Cath;  Surgeon: Cooper Apodaca MD;  Location:  [facility];  Service: Cardiology   • CARDIAC ELECTROPHYSIOLOGY PROCEDURE N/A 5/18/2022    Procedure: DEVICE IMPLANT;  Surgeon: Cooper Apodaca MD;  Location:  [facility];  Service: Cardiology;  Laterality: N/A;   • COLONOSCOPY     • CORONARY ARTERY BYPASS GRAFT N/A 3/22/2019    Procedure: MEDIAN STERNOTOMY, CORONARY ARTERY BYPASS GRAFT X3, UTILIZING THE LEFT INTERNAL MAMMARY ARTERY, EVH AND OPEN HARVEST OF THE RIGHT GREATER SAPHENOUS VEIN, EXPLORATION OF THE LEFT LEG;  Surgeon: Ap Au MD;  Location:  [facility];  Service: Cardiothoracic   • EYE SURGERY Bilateral     cataracts    • INTERVENTIONAL RADIOLOGY PROCEDURE N/A 7/29/2021    Procedure: Abdominal Aortagram with Runoff;  Surgeon: Jermaine Hernandez MD;  Location:  [facility];  Service: Cardiovascular;  Laterality: N/A;   • KNEE ARTHROSCOPY      right x 2, left x 1   • LACERATION REPAIR      right leg   • LEG SURGERY      2 for fracture of rt leg    • TONSILLECTOMY      Adnoidectomy       Family History   Problem Relation Age of Onset   • Coronary artery disease Mother    • Diabetes Mother    • Cancer Father        Social History     Socioeconomic History   • Marital status:    • Number of children: 2   Tobacco Use   • Smoking status: Never Smoker   • Smokeless tobacco: Never Used   Substance and Sexual Activity   • Alcohol use: No   • Drug use: No   • Sexual activity: Defer       No Known Allergies      Medication:    Current Facility-Administered Medications:   •  acetaminophen (TYLENOL) tablet 650 mg, 650 mg, Oral, Q4H PRN, 650 mg at 07/29/22 1426 **OR** acetaminophen (TYLENOL) 160 MG/5ML solution 650 mg, 650 mg, Oral, Q4H PRN **OR**  acetaminophen (TYLENOL) suppository 650 mg, 650 mg, Rectal, Q4H PRN, Nishant Allan PA  •  amLODIPine (NORVASC) tablet 10 mg, 10 mg, Oral, Daily, Norah Dunn APRN, 10 mg at 08/01/22 0821  •  aspirin chewable tablet 81 mg, 81 mg, Oral, Daily, Nishant Allan PA, 81 mg at 08/01/22 0821  •  bisacodyl (DULCOLAX) suppository 10 mg, 10 mg, Rectal, Daily PRN, Nishant Allan PA  •  cetirizine (zyrTEC) tablet 5 mg, 5 mg, Oral, Daily, Nishant Allan PA, 5 mg at 08/01/22 0821  •  dextrose (D50W) (25 g/50 mL) IV injection 25 g, 25 g, Intravenous, Q15 Min PRN, Nishant Allan PA  •  dextrose (GLUTOSE) oral gel 15 g, 15 g, Oral, Q15 Min PRN, Nishant Allan PA  •  fentaNYL citrate (PF) (SUBLIMAZE) injection 50 mcg, 50 mcg, Intravenous, Q5 Min PRN, Sage Wilkinson Jr., MD  •  glucagon (human recombinant) (GLUCAGEN DIAGNOSTIC) injection 1 mg, 1 mg, Intramuscular, Q15 Min PRN, Nishant Allan PA  •  heparin (porcine) 5000 UNIT/ML injection 5,000 Units, 5,000 Units, Subcutaneous, Q8H, Nishant Allan PA, 5,000 Units at 08/01/22 2120  •  HYDROcodone-acetaminophen (NORCO) 5-325 MG per tablet 1 tablet, 1 tablet, Oral, Q6H PRN, Nishant Allan PA, 1 tablet at 08/01/22 1634  •  HYDROmorphone (DILAUDID) injection 0.5 mg, 0.5 mg, Intravenous, Q3H PRN, Nishant Allan PA  •  HYDROmorphone (DILAUDID) injection 0.5 mg, 0.5 mg, Intravenous, Q5 Min PRN, Sage Wilkinson Jr., MD  •  insulin detemir (LEVEMIR) injection 25 Units, 25 Units, Subcutaneous, Nightly, Locklar, Nishant C, PA, 25 Units at 08/01/22 2119  •  Insulin Lispro (humaLOG) injection 0-9 Units, 0-9 Units, Subcutaneous, 4x Daily With Meals & Nightly, Nishant Allan PA, 2 Units at 08/01/22 2120  •  Insulin Lispro (humaLOG) injection 3 Units, 3 Units, Subcutaneous, TID With Meals, Nishant Allan PA, 3 Units at 08/01/22 1701  •  lactated ringers bolus 500 mL, 500 mL, Intravenous, Once PRN, Sage Wilkinson Jr., MD  •  lactated ringers infusion, 9 mL/hr,  Intravenous, Continuous, Sage Wilkinson Jr., MD, Last Rate: 9 mL/hr at 08/02/22 0946, Restarted at 08/02/22 1107  •  levothyroxine (SYNTHROID, LEVOTHROID) tablet 50 mcg, 50 mcg, Oral, Q AM, Nishant Allan PA, 50 mcg at 08/01/22 0536  •  lisinopril (PRINIVIL,ZESTRIL) tablet 5 mg, 5 mg, Oral, Q24H, Megan Shukla PA, 5 mg at 08/01/22 1039  •  metoprolol succinate XL (TOPROL-XL) 24 hr tablet 50 mg, 50 mg, Oral, Q24H, Megan Shukla PA, 50 mg at 08/01/22 1039  •  ondansetron (ZOFRAN) tablet 4 mg, 4 mg, Oral, Q6H PRN **OR** ondansetron (ZOFRAN) injection 4 mg, 4 mg, Intravenous, Q6H PRN, Nishant Allan PA  •  Pharmacy to dose vancomycin, , Does not apply, Continuous PRN, Eliana Hightower, APRN  •  Propofol (DIPRIVAN) injection 200 mg, 200 mg, Intravenous, Once, Nishant Allan PA  •  sennosides-docusate (PERICOLACE) 8.6-50 MG per tablet 2 tablet, 2 tablet, Oral, BID PRN, Nishant Allan PA  •  sodium chloride 0.45 % infusion, 35 mL/hr, Intravenous, Continuous, Nishant Allan PA  •  [COMPLETED] Insert peripheral IV, , , Once **AND** sodium chloride 0.9 % flush 10 mL, 10 mL, Intravenous, PRN, Nishant Allan PA  •  sodium chloride 0.9 % flush 10 mL, 10 mL, Intravenous, Q12H, Nishant Allan PA, 10 mL at 08/01/22 2122  •  sodium chloride 0.9 % flush 10 mL, 10 mL, Intravenous, PRN, Nishant Allan PA  •  vancomycin 1500 mg/500 mL 0.9% NS IVPB ([facility]), 15 mg/kg, Intravenous, Once, Nishant Allan PA  •  vitamin D3 capsule 5,000 Units, 5,000 Units, Oral, Daily, Nishant Allan PA, 5,000 Units at 08/01/22 0821    Facility-Administered Medications Ordered in Other Encounters:   •  Chlorhexidine Gluconate Cloth 2 % pads 1 application, 1 application, Topical, Q12H PRN, Mohan Arauz PA    Antibiotics:  Anti-Infectives (From admission, onward)    Ordered     Dose/Rate Route Frequency Start Stop    08/02/22 1111  vancomycin 1500 mg/500 mL 0.9% NS IVPB ([facility])        Ordering Provider: Nishant Allan PA     15 mg/kg × 98 kg Intravenous Once 22 1113      22 1209  vancomycin in dextrose 5% 150 mL (VANCOCIN) IVPB 750 mg        Ordering Provider: Miguel Bray RPH    750 mg Intravenous Every 12 Hours 22 2200 22 0934    22 1209  vancomycin in dextrose 5% 150 mL (VANCOCIN) IVPB 750 mg        Ordering Provider: Miguel Bray RPH    750 mg  over 60 Minutes Intravenous Every 12 Hours 22 1300 22 1422    22 0014  piperacillin-tazobactam (ZOSYN) 3.375 g in iso-osmotic dextrose 50 ml (premix)        Ordering Provider: Eliana Hightower APRN    3.375 g  over 4 Hours Intravenous Every 8 Hours 22 0800 22 0303    22 0014  Pharmacy to dose vancomycin        Ordering Provider: Eliana Hightower APRN     Does not apply Continuous PRN 22 0014 22 0013    22 2208  piperacillin-tazobactam (ZOSYN) 3.375 g in iso-osmotic dextrose 50 ml (premix)        Ordering Provider: Donny Hightower APRN    3.375 g  over 30 Minutes Intravenous Once 22 2210 22 0320    22 2208  vancomycin 2000 mg/500 mL 0.9% NS IVPB ([facility])        Ordering Provider: Donny Hightower APRN    20 mg/kg × 98 kg Intravenous Once 22 2210 22 0040            Review of Systems:  See HPI      Physical Exam:   Vital Signs  Temp (24hrs), Av.1 °F (36.7 °C), Min:97.3 °F (36.3 °C), Max:98.7 °F (37.1 °C)    Temp  Min: 97.3 °F (36.3 °C)  Max: 98.7 °F (37.1 °C)  BP  Min: 129/81  Max: 171/91  Pulse  Min: 60  Max: 79  Resp  Min: 16  Max: 18  SpO2  Min: 94 %  Max: 97 %    GENERAL: Awake and alert, in no acute distress.   HEENT: Normocephalic, atraumatic.  PERRL. EOMI. No conjunctival injection. No icterus. Oropharynx clear without evidence of thrush or exudate. No evidence of peridontal disease.    HEART: RRR; No murmur,  LUNGS: Clear to auscultation bilaterally   ABDOMEN: Soft, nontender,   :  Without Fonseca catheter.  MSK: Left foot wrapped  SKIN: Warm and dry without  cutaneous eruptions on Inspection/palpation.        Laboratory Data    Results from last 7 days   Lab Units 08/01/22  0918 07/29/22  0533 07/28/22  0348   WBC 10*3/mm3 7.81 8.39 10.87*   HEMOGLOBIN g/dL 13.4 13.2 12.9*   HEMATOCRIT % 40.0 39.0 37.3*   PLATELETS 10*3/mm3 264 233 237     Results from last 7 days   Lab Units 08/01/22  2129 07/31/22  0820   SODIUM mmol/L  --  137   POTASSIUM mmol/L 4.4 4.0   CHLORIDE mmol/L  --  103   CO2 mmol/L  --  26.0   BUN mg/dL  --  15   CREATININE mg/dL  --  1.18   GLUCOSE mg/dL  --  233*   CALCIUM mg/dL  --  8.7     Results from last 7 days   Lab Units 07/27/22  2041   ALK PHOS U/L 81   BILIRUBIN mg/dL 0.2   ALT (SGPT) U/L 19   AST (SGOT) U/L 15     Results from last 7 days   Lab Units 07/27/22  2041   SED RATE mm/hr 43*     Results from last 7 days   Lab Units 07/27/22  2041   CRP mg/dL 2.33*     Results from last 7 days   Lab Units 07/28/22  0348   LACTATE mmol/L 1.5         Results from last 7 days   Lab Units 08/01/22  0918 07/29/22  1211 07/28/22  1411   VANCOMYCIN TR mcg/mL  --  9.80 10.00   VANCOMYCIN RM mcg/mL 12.80  --   --      Estimated Creatinine Clearance: 72.7 mL/min (by C-G formula based on SCr of 1.18 mg/dL).      Microbiology:  No results found for: ACANTHNAEG, AFBCX, BPERTUSSISCX, BLOODCX  No results found for: BCIDPCR, CXREFLEX, CSFCX, CULTURETIS  No results found for: CULTURES, HSVCX, URCX  No results found for: EYECULTURE, GCCX, HSVCULTURE, LABHSV  No results found for: LEGIONELLA, MRSACX, MUMPSCX, MYCOPLASCX  No results found for: NOCARDIACX, STOOLCX  No results found for: THROATCX, UNSTIMCULT, URINECX, CULTURE, VZVCULTUR  No results found for: VIRALCULTU, WOUNDCX        Radiology:  Imaging Results (Last 72 Hours)     ** No results found for the last 72 hours. **            Impression:   Leukocytosis with neutrophilia, improved   Lactic acidosis  Left great toe wound- osteomyelitis by bone scan- amputation recommended by Dr. Márquez   Status post  fall  Diabetes mellitus type 2  Left shoulder dislocation  Probable peripheral vascular disease    PLAN/RECOMMENDATIONS:     Continue vancomycin and Zosyn    We are covering for skin and soft tissue infection I am more worried about peripheral vascular disease causing ischemia of left great toe    F/u path/operative cultures      Duration of abx to depend on pathology  Sage Braga MD  8/2/2022  11:53 EDT

## 2022-08-03 LAB
ANION GAP SERPL CALCULATED.3IONS-SCNC: 13 MMOL/L (ref 5–15)
BASOPHILS # BLD AUTO: 0.08 10*3/MM3 (ref 0–0.2)
BASOPHILS NFR BLD AUTO: 0.6 % (ref 0–1.5)
BUN SERPL-MCNC: 28 MG/DL (ref 8–23)
BUN/CREAT SERPL: 14.8 (ref 7–25)
CALCIUM SPEC-SCNC: 8.3 MG/DL (ref 8.6–10.5)
CHLORIDE SERPL-SCNC: 100 MMOL/L (ref 98–107)
CO2 SERPL-SCNC: 23 MMOL/L (ref 22–29)
CREAT SERPL-MCNC: 1.89 MG/DL (ref 0.76–1.27)
DEPRECATED RDW RBC AUTO: 39.7 FL (ref 37–54)
EGFRCR SERPLBLD CKD-EPI 2021: 36.1 ML/MIN/1.73
EOSINOPHIL # BLD AUTO: 0.01 10*3/MM3 (ref 0–0.4)
EOSINOPHIL NFR BLD AUTO: 0.1 % (ref 0.3–6.2)
ERYTHROCYTE [DISTWIDTH] IN BLOOD BY AUTOMATED COUNT: 12.5 % (ref 12.3–15.4)
GLUCOSE BLDC GLUCOMTR-MCNC: 149 MG/DL (ref 70–130)
GLUCOSE BLDC GLUCOMTR-MCNC: 168 MG/DL (ref 70–130)
GLUCOSE BLDC GLUCOMTR-MCNC: 210 MG/DL (ref 70–130)
GLUCOSE BLDC GLUCOMTR-MCNC: 318 MG/DL (ref 70–130)
GLUCOSE SERPL-MCNC: 300 MG/DL (ref 65–99)
HCT VFR BLD AUTO: 35 % (ref 37.5–51)
HGB BLD-MCNC: 11.7 G/DL (ref 13–17.7)
IMM GRANULOCYTES # BLD AUTO: 0.06 10*3/MM3 (ref 0–0.05)
IMM GRANULOCYTES NFR BLD AUTO: 0.5 % (ref 0–0.5)
LYMPHOCYTES # BLD AUTO: 0.99 10*3/MM3 (ref 0.7–3.1)
LYMPHOCYTES NFR BLD AUTO: 7.6 % (ref 19.6–45.3)
MCH RBC QN AUTO: 29 PG (ref 26.6–33)
MCHC RBC AUTO-ENTMCNC: 33.4 G/DL (ref 31.5–35.7)
MCV RBC AUTO: 86.6 FL (ref 79–97)
MONOCYTES # BLD AUTO: 0.94 10*3/MM3 (ref 0.1–0.9)
MONOCYTES NFR BLD AUTO: 7.2 % (ref 5–12)
NEUTROPHILS NFR BLD AUTO: 10.89 10*3/MM3 (ref 1.7–7)
NEUTROPHILS NFR BLD AUTO: 84 % (ref 42.7–76)
NRBC BLD AUTO-RTO: 0 /100 WBC (ref 0–0.2)
PLATELET # BLD AUTO: 271 10*3/MM3 (ref 140–450)
PMV BLD AUTO: 11.2 FL (ref 6–12)
POTASSIUM SERPL-SCNC: 4.1 MMOL/L (ref 3.5–5.2)
RBC # BLD AUTO: 4.04 10*6/MM3 (ref 4.14–5.8)
SODIUM SERPL-SCNC: 136 MMOL/L (ref 136–145)
VANCOMYCIN SERPL-MCNC: 25.5 MCG/ML (ref 5–40)
WBC NRBC COR # BLD: 12.97 10*3/MM3 (ref 3.4–10.8)

## 2022-08-03 PROCEDURE — 25010000002 PIPERACILLIN SOD-TAZOBACTAM PER 1 G: Performed by: INTERNAL MEDICINE

## 2022-08-03 PROCEDURE — 63710000001 INSULIN DETEMIR PER 5 UNITS: Performed by: PHYSICIAN ASSISTANT

## 2022-08-03 PROCEDURE — 99024 POSTOP FOLLOW-UP VISIT: CPT | Performed by: THORACIC SURGERY (CARDIOTHORACIC VASCULAR SURGERY)

## 2022-08-03 PROCEDURE — 80202 ASSAY OF VANCOMYCIN: CPT | Performed by: PHYSICIAN ASSISTANT

## 2022-08-03 PROCEDURE — 85025 COMPLETE CBC W/AUTO DIFF WBC: CPT | Performed by: PHYSICIAN ASSISTANT

## 2022-08-03 PROCEDURE — 80048 BASIC METABOLIC PNL TOTAL CA: CPT | Performed by: PHYSICIAN ASSISTANT

## 2022-08-03 PROCEDURE — 82962 GLUCOSE BLOOD TEST: CPT

## 2022-08-03 PROCEDURE — 25010000002 HEPARIN (PORCINE) PER 1000 UNITS: Performed by: PHYSICIAN ASSISTANT

## 2022-08-03 PROCEDURE — 99232 SBSQ HOSP IP/OBS MODERATE 35: CPT | Performed by: PHYSICIAN ASSISTANT

## 2022-08-03 PROCEDURE — 63710000001 INSULIN LISPRO (HUMAN) PER 5 UNITS: Performed by: PHYSICIAN ASSISTANT

## 2022-08-03 RX ORDER — INSULIN LISPRO 100 [IU]/ML
0-7 INJECTION, SOLUTION INTRAVENOUS; SUBCUTANEOUS
Status: DISCONTINUED | OUTPATIENT
Start: 2022-08-03 | End: 2022-08-15

## 2022-08-03 RX ORDER — HYDROCODONE BITARTRATE AND ACETAMINOPHEN 5; 325 MG/1; MG/1
1 TABLET ORAL EVERY 4 HOURS PRN
Status: DISCONTINUED | OUTPATIENT
Start: 2022-08-03 | End: 2022-08-26 | Stop reason: HOSPADM

## 2022-08-03 RX ORDER — INSULIN LISPRO 100 [IU]/ML
8 INJECTION, SOLUTION INTRAVENOUS; SUBCUTANEOUS
Status: DISCONTINUED | OUTPATIENT
Start: 2022-08-03 | End: 2022-08-04

## 2022-08-03 RX ADMIN — HYDROCODONE BITARTRATE AND ACETAMINOPHEN 1 TABLET: 5; 325 TABLET ORAL at 13:58

## 2022-08-03 RX ADMIN — AMLODIPINE BESYLATE 10 MG: 10 TABLET ORAL at 08:29

## 2022-08-03 RX ADMIN — HEPARIN SODIUM 5000 UNITS: 5000 INJECTION INTRAVENOUS; SUBCUTANEOUS at 21:13

## 2022-08-03 RX ADMIN — LEVOTHYROXINE SODIUM 50 MCG: 50 TABLET ORAL at 05:14

## 2022-08-03 RX ADMIN — INSULIN LISPRO 8 UNITS: 100 INJECTION, SOLUTION INTRAVENOUS; SUBCUTANEOUS at 08:28

## 2022-08-03 RX ADMIN — METOPROLOL SUCCINATE 50 MG: 50 TABLET, EXTENDED RELEASE ORAL at 08:29

## 2022-08-03 RX ADMIN — HEPARIN SODIUM 5000 UNITS: 5000 INJECTION INTRAVENOUS; SUBCUTANEOUS at 13:58

## 2022-08-03 RX ADMIN — CETIRIZINE HYDROCHLORIDE 5 MG: 10 TABLET, FILM COATED ORAL at 08:29

## 2022-08-03 RX ADMIN — LISINOPRIL 5 MG: 5 TABLET ORAL at 08:29

## 2022-08-03 RX ADMIN — INSULIN LISPRO 3 UNITS: 100 INJECTION, SOLUTION INTRAVENOUS; SUBCUTANEOUS at 11:24

## 2022-08-03 RX ADMIN — INSULIN LISPRO 2 UNITS: 100 INJECTION, SOLUTION INTRAVENOUS; SUBCUTANEOUS at 17:14

## 2022-08-03 RX ADMIN — INSULIN LISPRO 8 UNITS: 100 INJECTION, SOLUTION INTRAVENOUS; SUBCUTANEOUS at 17:15

## 2022-08-03 RX ADMIN — ASPIRIN 81 MG CHEWABLE TABLET 81 MG: 81 TABLET CHEWABLE at 08:29

## 2022-08-03 RX ADMIN — Medication 10 ML: at 08:30

## 2022-08-03 RX ADMIN — TAZOBACTAM SODIUM AND PIPERACILLIN SODIUM 3.38 G: 375; 3 INJECTION, SOLUTION INTRAVENOUS at 21:13

## 2022-08-03 RX ADMIN — Medication 5000 UNITS: at 08:29

## 2022-08-03 RX ADMIN — INSULIN DETEMIR 30 UNITS: 100 INJECTION, SOLUTION SUBCUTANEOUS at 21:14

## 2022-08-03 RX ADMIN — INSULIN LISPRO 5 UNITS: 100 INJECTION, SOLUTION INTRAVENOUS; SUBCUTANEOUS at 08:28

## 2022-08-03 RX ADMIN — INSULIN LISPRO 8 UNITS: 100 INJECTION, SOLUTION INTRAVENOUS; SUBCUTANEOUS at 11:24

## 2022-08-03 RX ADMIN — HEPARIN SODIUM 5000 UNITS: 5000 INJECTION INTRAVENOUS; SUBCUTANEOUS at 05:14

## 2022-08-03 NOTE — PROGRESS NOTES
Jermaine JIMENES Francisco  1945  [id number]    Date of Consult: 8/3/2022    Admission Date: 7/27/2022      Requesting Provider: No ref. provider found  Evaluating Physician: Sage Braga MD    Reason for Consultation: Evaluation of toe wound    History of present illness:    Patient is a 77 y.o. male with coronary disease history of CABG diabetes mellitus type 2 hypothyroidism hypertension hyperlipidemia prevascular disease presents the emergency room after having a fall and injuring left shoulder patient tripped while walking his dog.  Coincidentally patient wears an orthotic shoe as recommended by a podiatrist has been on oral doxycycline for a left toe wound we are asked to evaluate this patient to start on broad-spectrum antibiotics occluding vancomycin and Zosyn.  X-ray of left foot showed soft tissue swelling in the forefoot without obvious fracture or osteomyelitis.    Has history of pacemaker    History of vascular stent placed in right leg.    7/29/2022; is having bone scan today denies fevers rash sore throat or diarrhea    7/30/22:  Bone scan compatible with osteomyelitis distal phalanx of first digit, acute fracture 2nd digit.  Afebrile. Blood cultures no growth to date.     7/31/22:  Afebrile.   Dr. Márquez consulting Dr. Apodaca for vascular studies left lower extremity.   No n/v/d.  Complains of shoulder pain.     8/2/22; doing well; had surgery today; toe amputation, no fever, rash, sore throat    8/3/22; no events overnight; resting quietly no events overnigth  Past Medical History:   Diagnosis Date   • Asthma    • Coronary artery disease    • Diabetes mellitus (HCC) 2000    started on inuslin 12/2018; started on po meds in 2000; checking blood sugars daily    • Disease of thyroid gland     po meds daily for hypothyroidism    • History of fracture as a child     rt leg- severe    • Hyperlipidemia    • Hypertension    • Hypothyroidism    • Peripheral neuropathy    • Peripheral vascular  disease (HCC)     s/p angiogram 2/19-needs stent in left leg    • RBBB    • Right knee pain    • Vitamin D deficiency        Past Surgical History:   Procedure Laterality Date   • APPENDECTOMY     • CARDIAC CATHETERIZATION N/A 2/14/2019    Procedure: Left Heart Cath;  Surgeon: Cooper Apodaca MD;  Location:  [facility];  Service: Cardiology   • CARDIAC ELECTROPHYSIOLOGY PROCEDURE N/A 5/18/2022    Procedure: DEVICE IMPLANT;  Surgeon: Cooper Apodaca MD;  Location:  [facility];  Service: Cardiology;  Laterality: N/A;   • COLONOSCOPY     • CORONARY ARTERY BYPASS GRAFT N/A 3/22/2019    Procedure: MEDIAN STERNOTOMY, CORONARY ARTERY BYPASS GRAFT X3, UTILIZING THE LEFT INTERNAL MAMMARY ARTERY, EVH AND OPEN HARVEST OF THE RIGHT GREATER SAPHENOUS VEIN, EXPLORATION OF THE LEFT LEG;  Surgeon: Ap Au MD;  Location:  [facility];  Service: Cardiothoracic   • EYE SURGERY Bilateral     cataracts    • INTERVENTIONAL RADIOLOGY PROCEDURE N/A 7/29/2021    Procedure: Abdominal Aortagram with Runoff;  Surgeon: Jermaine Hernandez MD;  Location:  [facility];  Service: Cardiovascular;  Laterality: N/A;   • KNEE ARTHROSCOPY      right x 2, left x 1   • LACERATION REPAIR      right leg   • LEG SURGERY      2 for fracture of rt leg    • TONSILLECTOMY      Adnoidectomy   • TRANS METATARSAL AMPUTATION Left 8/2/2022    Procedure: GREAT TOE AMPUTATION LEFT;  Surgeon: Gopal Márquez MD;  Location:  [facility];  Service: Vascular;  Laterality: Left;       Family History   Problem Relation Age of Onset   • Coronary artery disease Mother    • Diabetes Mother    • Cancer Father        Social History     Socioeconomic History   • Marital status:    • Number of children: 2   Tobacco Use   • Smoking status: Never Smoker   • Smokeless tobacco: Never Used   Substance and Sexual Activity   • Alcohol use: No   • Drug use: No   • Sexual activity: Defer       No Known  Allergies      Medication:    Current Facility-Administered Medications:   •  acetaminophen (TYLENOL) tablet 650 mg, 650 mg, Oral, Q4H PRN, 650 mg at 07/29/22 1426 **OR** acetaminophen (TYLENOL) 160 MG/5ML solution 650 mg, 650 mg, Oral, Q4H PRN **OR** acetaminophen (TYLENOL) suppository 650 mg, 650 mg, Rectal, Q4H PRN, Nishant Allan PA  •  amLODIPine (NORVASC) tablet 10 mg, 10 mg, Oral, Daily, Nishant Allan PA, 10 mg at 08/03/22 0829  •  aspirin chewable tablet 81 mg, 81 mg, Oral, Daily, Nishant Allan PA, 81 mg at 08/03/22 0829  •  bisacodyl (DULCOLAX) suppository 10 mg, 10 mg, Rectal, Daily PRN, Nishant Allan PA  •  cetirizine (zyrTEC) tablet 5 mg, 5 mg, Oral, Daily, Nishant Allan PA, 5 mg at 08/03/22 0829  •  dextrose (D50W) (25 g/50 mL) IV injection 25 g, 25 g, Intravenous, Q15 Min PRN, Nishant Allan PA  •  dextrose (GLUTOSE) oral gel 15 g, 15 g, Oral, Q15 Min PRN, Nishant Allan PA  •  glucagon (human recombinant) (GLUCAGEN DIAGNOSTIC) injection 1 mg, 1 mg, Intramuscular, Q15 Min PRN, Nishant Allan PA  •  heparin (porcine) 5000 UNIT/ML injection 5,000 Units, 5,000 Units, Subcutaneous, Q8H, Nishant Allan PA 5,000 Units at 08/03/22 1358  •  HYDROcodone-acetaminophen (NORCO) 5-325 MG per tablet 1 tablet, 1 tablet, Oral, Q4H PRN, Peter Braga DO, 1 tablet at 08/03/22 1358  •  insulin detemir (LEVEMIR) injection 30 Units, 30 Units, Subcutaneous, Nightly, Melissa Nguyen PAJuanC  •  Insulin Lispro (humaLOG) injection 0-7 Units, 0-7 Units, Subcutaneous, TID AC, Melissa Nguyen PA-C, 3 Units at 08/03/22 1124  •  Insulin Lispro (humaLOG) injection 8 Units, 8 Units, Subcutaneous, TID With Meals, Melissa Nguyen PA-C, 8 Units at 08/03/22 1124  •  lactated ringers infusion, 9 mL/hr, Intravenous, Continuous, Locklar, RANULFO Holly, Last Rate: 9 mL/hr at 08/02/22 0946, Restarted at 08/02/22 1107  •  levothyroxine (SYNTHROID, LEVOTHROID) tablet 50 mcg, 50 mcg, Oral, Q AM, Locklar,  RANULFO Holly, 50 mcg at 08/03/22 0514  •  metoprolol succinate XL (TOPROL-XL) 24 hr tablet 50 mg, 50 mg, Oral, Q24H, Nishant Allan PA, 50 mg at 08/03/22 0829  •  ondansetron (ZOFRAN) tablet 4 mg, 4 mg, Oral, Q6H PRN **OR** ondansetron (ZOFRAN) injection 4 mg, 4 mg, Intravenous, Q6H PRN, Nishant Allan PA  •  Pharmacy to dose vancomycin, , Does not apply, Continuous PRN, Nishant Allan PA  •  sennosides-docusate (PERICOLACE) 8.6-50 MG per tablet 2 tablet, 2 tablet, Oral, BID PRN, Nishant Allan PA  •  sodium chloride 0.45 % infusion, 35 mL/hr, Intravenous, Continuous, Nishant Allan PA, Last Rate: 35 mL/hr at 08/02/22 1455, 35 mL/hr at 08/02/22 1455  •  [COMPLETED] Insert peripheral IV, , , Once **AND** sodium chloride 0.9 % flush 10 mL, 10 mL, Intravenous, PRN, Nishant Allan PA  •  sodium chloride 0.9 % flush 10 mL, 10 mL, Intravenous, Q12H, Nishant Allan PA, 10 mL at 08/03/22 0830  •  sodium chloride 0.9 % flush 10 mL, 10 mL, Intravenous, PRN, Nishant Allan PA  •  vitamin D3 capsule 5,000 Units, 5,000 Units, Oral, Daily, Nishant Allan PA, 5,000 Units at 08/03/22 0829    Facility-Administered Medications Ordered in Other Encounters:   •  Chlorhexidine Gluconate Cloth 2 % pads 1 application, 1 application, Topical, Q12H PRN, Mohan Arauz PA    Antibiotics:  Anti-Infectives (From admission, onward)    Ordered     Dose/Rate Route Frequency Start Stop    08/02/22 1111  vancomycin 1500 mg/500 mL 0.9% NS IVPB ([facility])        Ordering Provider: Nishant Allan PA    15 mg/kg × 98 kg Intravenous Once 08/02/22 2200 08/02/22 2130    08/01/22 1209  vancomycin in dextrose 5% 150 mL (VANCOCIN) IVPB 750 mg        Ordering Provider: Miguel Bray RPH    750 mg Intravenous Every 12 Hours 08/01/22 2200 08/02/22 0934    08/01/22 1209  vancomycin in dextrose 5% 150 mL (VANCOCIN) IVPB 750 mg        Ordering Provider: Miguel Bray RPH    750 mg  over 60 Minutes Intravenous Every 12 Hours 08/01/22  1300 22 1422    22 0014  piperacillin-tazobactam (ZOSYN) 3.375 g in iso-osmotic dextrose 50 ml (premix)        Ordering Provider: Eliana Hightower APRN    3.375 g  over 4 Hours Intravenous Every 8 Hours 22 0800 22 0303    22 0014  Pharmacy to dose vancomycin        Ordering Provider: Nishant Allan PA     Does not apply Continuous PRN 22 0014 22 0013    22 2208  piperacillin-tazobactam (ZOSYN) 3.375 g in iso-osmotic dextrose 50 ml (premix)        Ordering Provider: Donny Hightower APRN    3.375 g  over 30 Minutes Intravenous Once 22 2210 22 0320    22 2208  vancomycin 2000 mg/500 mL 0.9% NS IVPB ([facility])        Ordering Provider: Donny Hightower APRN    20 mg/kg × 98 kg Intravenous Once 22 22122 0040            Review of Systems:  See HPI      Physical Exam:   Vital Signs  Temp (24hrs), Av °F (36.7 °C), Min:97.8 °F (36.6 °C), Max:98.4 °F (36.9 °C)    Temp  Min: 97.8 °F (36.6 °C)  Max: 98.4 °F (36.9 °C)  BP  Min: 121/60  Max: 143/83  Pulse  Min: 66  Max: 86  Resp  Min: 16  Max: 18  SpO2  Min: 90 %  Max: 96 %    GENERAL: resting quietly  in no acute distress.   HEENT: Normocephalic, atraumatic.  PERRL. EOMI. No conjunctival injection. No icterus. Oropharynx clear without evidence of thrush or exudate. No evidence of peridontal disease.    HEART: RRR; No murmur,  LUNGS: Clear to auscultation bilaterally   ABDOMEN: Soft, nontender,   :  Without Fonseca catheter.  MSK: Left foot wrapped  SKIN: Warm and dry without cutaneous eruptions on Inspection/palpation.        Laboratory Data    Results from last 7 days   Lab Units 22  0549 22  0918 22  0533   WBC 10*3/mm3 12.97* 7.81 8.39   HEMOGLOBIN g/dL 11.7* 13.4 13.2   HEMATOCRIT % 35.0* 40.0 39.0   PLATELETS 10*3/mm3 271 264 233     Results from last 7 days   Lab Units 22  0549   SODIUM mmol/L 136   POTASSIUM mmol/L 4.1   CHLORIDE mmol/L 100   CO2 mmol/L 23.0   BUN mg/dL  28*   CREATININE mg/dL 1.89*   GLUCOSE mg/dL 300*   CALCIUM mg/dL 8.3*     Results from last 7 days   Lab Units 07/27/22  2041   ALK PHOS U/L 81   BILIRUBIN mg/dL 0.2   ALT (SGPT) U/L 19   AST (SGOT) U/L 15     Results from last 7 days   Lab Units 07/27/22  2041   SED RATE mm/hr 43*     Results from last 7 days   Lab Units 07/27/22  2041   CRP mg/dL 2.33*     Results from last 7 days   Lab Units 07/28/22  0348   LACTATE mmol/L 1.5         Results from last 7 days   Lab Units 08/03/22  0549 08/01/22  0918 07/29/22  1211 07/28/22  1411   VANCOMYCIN TR mcg/mL  --   --  9.80 10.00   VANCOMYCIN RM mcg/mL 25.50 12.80  --   --      Estimated Creatinine Clearance: 45.4 mL/min (A) (by C-G formula based on SCr of 1.89 mg/dL (H)).      Microbiology:  No results found for: ACANTHNAEG, AFBCX, BPERTUSSISCX, BLOODCX  No results found for: BCIDPCR, CXREFLEX, CSFCX, CULTURETIS  No results found for: CULTURES, HSVCX, URCX  No results found for: EYECULTURE, GCCX, HSVCULTURE, LABHSV  No results found for: LEGIONELLA, MRSACX, MUMPSCX, MYCOPLASCX  No results found for: NOCARDIACX, STOOLCX  No results found for: THROATCX, UNSTIMCULT, URINECX, CULTURE, VZVCULTUR  No results found for: VIRALCULTU, WOUNDCX        Radiology:  Imaging Results (Last 72 Hours)     ** No results found for the last 72 hours. **            Impression:   Leukocytosis with neutrophilia, improved   Lactic acidosis  Left great toe wound- osteomyelitis by bone scan- amputation recommended by Dr. Márquez   Status post fall  Diabetes mellitus type 2  Left shoulder dislocation  Probable peripheral vascular disease    PLAN/RECOMMENDATIONS:     Continue vancomycin and Zosyn    We are covering for skin and soft tissue infection I am more worried about peripheral vascular disease causing ischemia of left great toe    F/u path/operative cultures      Duration of abx to depend on pathology  Sage Braga MD  8/3/2022  14:59 EDT

## 2022-08-03 NOTE — PROGRESS NOTES
Pharmacy Consult-Vancomycin Dosing  Jermaine Singh is a  77 y.o. male receiving vancomycin therapy.     Indication: SSTI  Consulting Provider: hospitalist  ID Consult: Yes    Goal AUC: 400 - 600 mg/L*hr    Current Antimicrobial Therapy  Anti-Infectives (From admission, onward)      Ordered     Dose/Rate Route Frequency Start Stop    08/03/22 1608  piperacillin-tazobactam (ZOSYN) 3.375 g in iso-osmotic dextrose 50 ml (premix)        Ordering Provider: Sage Braga MD    3.375 g  over 4 Hours Intravenous Every 8 Hours 08/03/22 2200 08/10/22 2159    08/02/22 1111  vancomycin 1500 mg/500 mL 0.9% NS IVPB ([facility])        Ordering Provider: Nishant Allan PA    15 mg/kg × 98 kg Intravenous Once 08/02/22 2200 08/02/22 2130    08/01/22 1209  vancomycin in dextrose 5% 150 mL (VANCOCIN) IVPB 750 mg        Ordering Provider: Miguel Bray RPH    750 mg Intravenous Every 12 Hours 08/01/22 2200 08/02/22 0934    08/01/22 1209  vancomycin in dextrose 5% 150 mL (VANCOCIN) IVPB 750 mg        Ordering Provider: Miguel Bray RPH    750 mg  over 60 Minutes Intravenous Every 12 Hours 08/01/22 1300 08/01/22 1422    07/28/22 0014  piperacillin-tazobactam (ZOSYN) 3.375 g in iso-osmotic dextrose 50 ml (premix)        Ordering Provider: Eliana Hightower APRN    3.375 g  over 4 Hours Intravenous Every 8 Hours 07/28/22 0800 08/02/22 0303    07/28/22 0014  Pharmacy to dose vancomycin        Ordering Provider: Sage Braga MD     Does not apply Continuous PRN 07/28/22 0014 08/11/22 0013    07/27/22 2208  piperacillin-tazobactam (ZOSYN) 3.375 g in iso-osmotic dextrose 50 ml (premix)        Ordering Provider: Donny Hightower APRN    3.375 g  over 30 Minutes Intravenous Once 07/27/22 2210 07/28/22 0320    07/27/22 2208  vancomycin 2000 mg/500 mL 0.9% NS IVPB ([facility])        Ordering Provider: Donny Hightower APRN    20 mg/kg × 98 kg Intravenous Once 07/27/22 2210 07/28/22 0040            Allergies  Allergies as of 07/27/2022     "(No Known Allergies)       Labs    Results from last 7 days   Lab Units 08/03/22  0549 08/02/22  1416 07/31/22  0820   BUN mg/dL 28* 22 15   CREATININE mg/dL 1.89* 1.33* 1.18       Results from last 7 days   Lab Units 08/03/22  0549 08/01/22  0918 07/29/22  0533   WBC 10*3/mm3 12.97* 7.81 8.39       Evaluation of Dosing     Last Dose Received in the ED/Outside Facility: 2000mg in ED  Is Patient on Dialysis or Renal Replacement: -    Ht - 190.5 cm (75\")  Wt - 98 kg (216 lb)    Estimated Creatinine Clearance: 45.4 mL/min (A) (by C-G formula based on SCr of 1.89 mg/dL (H)).    Intake & Output (last 3 days)         07/31 0701 08/01 0700 08/01 0701  08/02 0700 08/02 0701 08/03 0700 08/03 0701 08/04 0700    P.O. 525 960 600 727    I.V. (mL/kg)   500 (5.1)     IV Piggyback  50      Total Intake(mL/kg) 525 (5.4) 1010 (10.3) 1100 (11.2) 727 (7.4)    Urine (mL/kg/hr) 300 (0.1) 1600 (0.7) 650 (0.3) 1050 (1.2)    Stool 0 0      Total Output [phone] 4899    Net +225 -590 +450 -323            Urine Unmeasured Occurrence 1 x 1 x      Stool Unmeasured Occurrence 1 x 1 x              Microbiology and Radiology  Microbiology Results (last 10 days)       Procedure Component Value - Date/Time    Tissue / Bone Culture - Tissue, Toe, Left [[id number]] Collected: 08/02/22 1027    Lab Status: Preliminary result Specimen: Tissue from Toe, Left Updated: 08/03/22 1033     Tissue Culture Culture in progress     Gram Stain Moderate (3+) WBCs seen      No organisms seen    COVID PRE-OP / PRE-PROCEDURE SCREENING ORDER (NO ISOLATION) - Swab, Nasopharynx [[id number]]  (Normal) Collected: 07/27/22 2246    Lab Status: Final result Specimen: Swab from Nasopharynx Updated: 07/27/22 0407    Narrative:      The following orders were created for panel order COVID PRE-OP / PRE-PROCEDURE SCREENING ORDER (NO ISOLATION) - Swab, Nasopharynx.  Procedure                               Abnormality         Status                     ---------                  "              -----------         ------                     COVID-19 and FLU A/B PCR...[[id number]]  Normal              Final result                 Please view results for these tests on the individual orders.    COVID-19 and FLU A/B PCR - Swab, Nasopharynx [[id number]]  (Normal) Collected: 07/27/22 2246    Lab Status: Final result Specimen: Swab from Nasopharynx Updated: 07/27/22 2357     COVID19 Not Detected     Influenza A PCR Not Detected     Influenza B PCR Not Detected    Narrative:      Fact sheet for providers: https://www.fda.gov/media/127450/download    Fact sheet for patients: https://www.fda.gov/media/601995/download    Test performed by PCR.    Blood Culture - Blood, Hand, Left [[id number]]  (Normal) Collected: 07/27/22 2040    Lab Status: Final result Specimen: Blood from Hand, Left Updated: 08/01/22 2132     Blood Culture No growth at 5 days    Blood Culture - Blood, Hand, Right [[id number]]  (Normal) Collected: 07/27/22 2030    Lab Status: Final result Specimen: Blood from Hand, Right Updated: 08/01/22 2132     Blood Culture No growth at 5 days            Reported Vancomycin Levels    Results from last 7 days   Lab Units 08/03/22  0549 08/01/22  0918   VANCOMYCIN RM mcg/mL 25.50 12.80             Results from last 7 days   Lab Units 07/29/22  1211 07/28/22  1411   VANCOMYCIN TR mcg/mL 9.80 10.00          [employer] AUC Calculation:    Current AUC:  561 mg/L*hr    Predicted Steady State AUC :   1315 mg/L*hr    Predicted Steady State AUC on new dose:     Assessment/Plan:   1. Pharmacy to dose vancomycin for SSTI.   2. Patient received a loading dose of vancomycin 2000 mg on 7/28.   3. Patient was receiving maintenance dose of vancomycin 750 mg q12h. Note that patient received additional dose of 1500 mg for surgical prophylaxis on 8/2 @ 2200.   4. Vancomycin random was drawn appropriately and is at 25.5 mcg/mL on 8/3 @ 0549. Predictable level given increase in dose.   5. Will hold off on giving Vancomycin  dose today due to 1500 mg dose given last night. In addition Scr has bumped from 1.33 to 1.89. Urine output has remained consistent.   6. Vancomycin random scheduled for 8/4 with AM labs to assess clearance of most recent dose. Will start new maintenance dose after level has been evaluated.   7. Monitor renal function, cultures and sensitivities, and clinical status, and adjust regimen as necessary.  Pharmacy will continue to follow.      Miguel Bray PharmD  08/03/22  16:09 EDT

## 2022-08-03 NOTE — PROGRESS NOTES
[facility] Medicine Services  PROGRESS NOTE    Patient Name: Jermaine Singh  : 1945  MRN: [id number]    Date of Admission: 2022  Primary Care Physician: Farooq Painter MD    Subjective     CC: f/u L great toe amputation    HPI:  Sitting up in bed, finishing breakfast. No pain. No chest pain, dyspnea, abdominal pain, nausea or vomiting. Bowels moving well. L shoulder is sore     ROS:  Gen- No fevers, chills  CV- No chest pain, palpitations  Resp- No cough, dyspnea  GI- No N/V/D, abd pain    Objective     Vital Signs:   Temp:  [97.8 °F (36.6 °C)-98.4 °F (36.9 °C)] 97.8 °F (36.6 °C)  Heart Rate:  [66-86] 66  Resp:  [16-18] 18  BP: (121-143)/(60-83) 121/60     Physical Exam:  Constitutional: No acute distress, awake, alert and conversant. Sitting upright in bed   HENT: NCAT, mucous membranes moist  Respiratory: Clear to auscultation bilaterally, respiratory effort normal on room air   Cardiovascular: RRR, no murmurs, rubs, or gallops. Palpable pedal pulses bilaterally   Gastrointestinal: Positive bowel sounds, soft, nontender, nondistended  Musculoskeletal: No bilateral ankle edema. L foot dressed and in offloading boot, I did not remove dressings for exam  Psychiatric: Appropriate affect, cooperative  Neurologic: Oriented x 3, moves all extremities spontaneously, speech clear  Skin: No rashes    Results Reviewed:  LAB RESULTS:      Lab 22  0549 22  0918 22  0533 22  0348 22  0004 22  2041   WBC 12.97* 7.81 8.39 10.87*  --  12.22*   HEMOGLOBIN 11.7* 13.4 13.2 12.9*  --  14.2   HEMATOCRIT 35.0* 40.0 39.0 37.3*  --  41.2   PLATELETS 271 264 233 237  --  239   NEUTROS ABS 10.89*  --  5.24 8.15*  --  9.95*   IMMATURE GRANS (ABS) 0.06*  --  0.02 0.03  --  0.04   LYMPHS ABS 0.99  --  1.88 1.57  --  1.09   MONOS ABS 0.94*  --  0.82 0.97*  --  0.94*   EOS ABS 0.01  --  0.36 0.11  --  0.14   MCV 86.6 87.1 87.1 84.8  --  86.0   SED RATE  --   --   --    --   --  43*   CRP  --   --   --   --   --  2.33*   PROCALCITONIN  --   --   --   --   --  0.08   LACTATE  --   --   --  1.5 2.2* 2.6*         Lab 08/03/22  0549 08/02/22  1416 08/01/22 2129 07/31/22  0820 07/30/22  0635 07/29/22  0533 07/28/22  0348   SODIUM 136 137  --  137 138 138 144   POTASSIUM 4.1 4.2 4.4 4.0 4.0 4.2 4.9   CHLORIDE 100 100  --  103 104 104 108*   CO2 23.0 22.0  --  26.0 28.0 27.0 31.0*   ANION GAP 13.0 15.0  --  8.0 6.0 7.0 5.0   BUN 28* 22  --  15 15 18 20   CREATININE 1.89* 1.33*  --  1.18 1.10 1.14 0.93   EGFR 36.1* 55.1*  --  63.6 69.1 66.2 84.6   GLUCOSE 300* 288*  --  233* 220* 253* 192*   CALCIUM 8.3* 8.7  --  8.7 8.6 8.8 9.2   MAGNESIUM  --   --   --   --   --   --  2.0   HEMOGLOBIN A1C  --   --   --   --   --   --  9.00*         Lab 07/27/22 2041   TOTAL PROTEIN 7.1   ALBUMIN 3.70   GLOBULIN 3.4   ALT (SGPT) 19   AST (SGOT) 15   BILIRUBIN 0.2   ALK PHOS 81     Brief Urine Lab Results  (Last result in the past 365 days)      Color   Clarity   Blood   Leuk Est   Nitrite   Protein   CREAT   Urine HCG        02/27/22 1321 Dark Yellow   Clear   Negative   Negative   Negative   2+               Microbiology Results Abnormal     Procedure Component Value - Date/Time    Tissue / Bone Culture - Tissue, Toe, Left [[id number]] Collected: 08/02/22 1027    Lab Status: Preliminary result Specimen: Tissue from Toe, Left Updated: 08/03/22 1033     Tissue Culture Culture in progress     Gram Stain Moderate (3+) WBCs seen      No organisms seen    Blood Culture - Blood, Hand, Left [[id number]]  (Normal) Collected: 07/27/22 2040    Lab Status: Final result Specimen: Blood from Hand, Left Updated: 08/01/22 2132     Blood Culture No growth at 5 days    Blood Culture - Blood, Hand, Right [[id number]]  (Normal) Collected: 07/27/22 2030    Lab Status: Final result Specimen: Blood from Hand, Right Updated: 08/01/22 2132     Blood Culture No growth at 5 days    COVID PRE-OP / PRE-PROCEDURE SCREENING ORDER (NO  ISOLATION) - Swab, Nasopharynx [[id number]]  (Normal) Collected: 07/27/22 2246    Lab Status: Final result Specimen: Swab from Nasopharynx Updated: 07/27/22 2357    Narrative:      The following orders were created for panel order COVID PRE-OP / PRE-PROCEDURE SCREENING ORDER (NO ISOLATION) - Swab, Nasopharynx.  Procedure                               Abnormality         Status                     ---------                               -----------         ------                     COVID-19 and FLU A/B PCR...[[id number]]  Normal              Final result                 Please view results for these tests on the individual orders.    COVID-19 and FLU A/B PCR - Swab, Nasopharynx [[id number]]  (Normal) Collected: 07/27/22 2246    Lab Status: Final result Specimen: Swab from Nasopharynx Updated: 07/27/22 2357     COVID19 Not Detected     Influenza A PCR Not Detected     Influenza B PCR Not Detected    Narrative:      Fact sheet for providers: https://www.fda.gov/media/190579/download    Fact sheet for patients: https://www.fda.gov/media/874335/download    Test performed by PCR.        Peripheral Block    Result Date: 8/2/2022  Tori Fleming CRNA     8/2/2022  9:25 AM Peripheral Block Patient reassessed immediately prior to procedure Patient location during procedure: pre-op Reason for block: at surgeon's request and post-op pain management Performed by CRNA/CAA: Tori Fleming CRNA Assisted by: Hermelinda Shanks RN Preanesthetic Checklist Completed: patient identified, IV checked, site marked, risks and benefits discussed, surgical consent, monitors and equipment checked, pre-op evaluation and timeout performed Prep: Sterile barriers:cap, gloves, mask, sterile barriers and washed/disinfected hands Prep: ChloraPrep Patient monitoring: blood pressure monitoring, continuous pulse oximetry and EKG Procedure Sedation: yes Performed under: local infiltration Guidance:ultrasound guided Images:still images obtained,  printed/placed on chart Laterality:left Block Type:popliteal Injection Technique:single-shot Needle Type:echogenic Needle Gauge:18 G Resistance on Injection: none Catheter Size:20 G Medications Used: bupivacaine PF (MARCAINE) 0.25 % injection, 30 mL dexamethasone sodium phosphate injection, 2 mg Med administered at 8/2/2022 9:20 AM Post Assessment Injection Assessment: negative aspiration for heme, no paresthesia on injection and incremental injection Patient Tolerance:comfortable throughout block Complications:no Additional Notes Procedure:                                                     The pt was placed in  lateral position.  The Insertion site was  prepped and Draped in sterile fashion.  The pt was anesthetized with  IV Sedation( see meds).  Skin and cutaneous tissue was infiltrated and anesthetized with 1% Lidocaine 3 mls via a 25g needle.  A BBraun 4 inch 18g echogenic needle was then  inserted approximately 3 cm proximal to the popliteal fossa at the lateral mid biceps femoris and advanced In-plane with Ultrasound guidance.  Normal Saline PSF was utilized for hydrodissection of tissue.  The popliteal artery was visualized and the common peroneal and tibial bifurcation was located.  LA injection spread was visualized, injection was incremental 1-5ml, injection pressure was normal or little, no intraneural injection, no vascular injection.  .  A BBraun 20g wire stylet catheter was placed via the needle with ultrasound visualization and confirmation with NS fluid bolus. The labeled catheter was then secured at insertion site with exofin tissue adhesive, benzoin, steristrips  and a CHG transparent dressing was placed over. Thank you     I have reviewed the medications:  Scheduled Meds:amLODIPine, 10 mg, Oral, Daily  aspirin, 81 mg, Oral, Daily  cetirizine, 5 mg, Oral, Daily  heparin (porcine), 5,000 Units, Subcutaneous, Q8H  insulin detemir, 30 Units, Subcutaneous, Nightly  insulin lispro, 0-7 Units,  Subcutaneous, TID AC  Insulin Lispro, 8 Units, Subcutaneous, TID With Meals  levothyroxine, 50 mcg, Oral, Q AM  metoprolol succinate XL, 50 mg, Oral, Q24H  sodium chloride, 10 mL, Intravenous, Q12H  vitamin D3, 5,000 Units, Oral, Daily      Continuous Infusions:lactated ringers, 9 mL/hr, Last Rate: 9 mL/hr (08/02/22 0946)  Pharmacy to dose vancomycin,   sodium chloride, 35 mL/hr, Last Rate: 35 mL/hr (08/02/22 1455)      PRN Meds:.•  acetaminophen **OR** acetaminophen **OR** acetaminophen  •  bisacodyl  •  dextrose  •  dextrose  •  glucagon (human recombinant)  •  HYDROcodone-acetaminophen  •  ondansetron **OR** ondansetron  •  Pharmacy to dose vancomycin  •  sennosides-docusate  •  [COMPLETED] Insert peripheral IV **AND** sodium chloride  •  sodium chloride    Assessment & Plan     Active Hospital Problems    Diagnosis  POA   • Proximal phalanx fracture of the second digit extending into the second metatarsal joint [S92.919A]  Unknown   • Dislocation of left shoulder joint, initial encounter [S43.005A]  Yes   • Cellulitis in diabetic foot ([facility]) [E11.628, L03.119]  Yes   • Fall [W19.XXXA]  Yes   • Sepsis ([facility]) [A41.9]  Yes   • Leukocytosis [D72.829]  Yes   • Lactic acidosis [E87.2]  Yes   • Elevated C-reactive protein (CRP) [R79.82]  Yes   • Elevated sed rate (elev SR) [R70.0]  Yes   • SSS (sick sinus syndrome) ([facility]) [I49.5]  Yes   • Hyperlipidemia [E78.5]  Yes   • Hypertension [I10]  Yes   • S/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 Au [Z95.1]  Not Applicable   • Hypothyroidism (acquired) [E03.9]  Yes   • Type 2 diabetes mellitus ([facility]) [E11.9]  Yes      Resolved Hospital Problems   No resolved problems to display.     Brief Hospital Course to date:  Jermaine Singh is a 77 y.o. male with PMH significant for HTN, HLD, CAD s/p CABG, PVD s/p RLE stent, CKD III, insulin-dependent DMII and hypothyroidism. He was admitted to [facility] 7/27/22 for L shoulder dislocation after a fall as well as sepsis secondary to  L great toe osteomyelitis.     L great toe cellulitis / osteomyelitis  Peripheral vascular disease   - Followed by Dr. Joseph of podiatry - was on PO Doxycycline outpatient  - Bone scan concerning for osteomyelitis of L great toe   - s/p L great toe amputation by Dr. Márquez 8/2/22   - Appreciate cardiology evaluation - arterial duplex obtained. Cardiology does not recommend revascularization at this time   - ID following - has been on IV Zosyn/Vancomycin. Currently does not have active antibiotic orders     Fall   Anterior L shoulder dislocation  Acute fracture of the  proximal phalanx of the second digit extending into the second metatarsophalangeal joint  - s/p reduction in the ED  - Appreciate ortho (Dr. Gipson) assistance   - Recommend non-operative treatment. Sling for comfort.   - PT for range of motion exercises, avoid ABduction and external rotation  - Follow up with Dr. Gipson in 2-3 weeks     Insulin-dependent DMII  - Hgb A1c 9.0%  - Increase Levemir to 30 units QHS and Lispro to 8 units TID AC + SSI     [place]  - Baseline creatinine appears to be 0.9-1.2  - Creatinine 1.89 today  - Cardiology has stopped ACE  - AM BMP      Hypertension  Coronary artery disease s/p CABG  SSS s/p PPM   - Cardiology has stopped ACEI and added Amlodipine 10mg daily   - Continue Metoprolol XL 50mg daily      Hypothyroid  - Continue levothyroxine    Expected Discharge Location and Transportation: home vs rehab (pending PT/OT recommendations) via private vehicle   Expected Discharge Date: 8/5/22    DVT prophylaxis:Medical and mechanical DVT prophylaxis orders are present.     AM-PAC 6 Clicks Score (PT): 13 (08/03/22 0800)    CODE STATUS:   Code Status and Medical Interventions:   Ordered at: 07/28/22 0000     Code Status (Patient has no pulse and is not breathing):    CPR (Attempt to Resuscitate)     Medical Interventions (Patient has pulse or is breathing):    Full Support     Melissa Nguyen PA-C  08/03/22

## 2022-08-03 NOTE — PLAN OF CARE
Goal Outcome Evaluation:  Plan of Care Reviewed With: patient        Progress: no change   Pt is alert and oriented X 4. VSS, RA. Slept throughout the night. Voiding using urinal. Heels boot in place.

## 2022-08-03 NOTE — CASE MANAGEMENT/SOCIAL WORK
Continued Stay Note  [facility]     Patient Name: Jermaine Singh  MRN: [id number]  Today's Date: 8/3/2022    Admit Date: 7/27/2022     Discharge Plan     Row Name 08/03/22 0940       Plan    Plan Comments Pt had his amputation yesterday. CM is following for PT eval and ID plan for antibiotics. Discharge plan will be arranged once these evaluations are completed. Pt is in agreement with plan.               Discharge Codes    No documentation.               Expected Discharge Date and Time     Expected Discharge Date Expected Discharge Time    Aug 1, 2022             Norah Rivera RN

## 2022-08-03 NOTE — PROGRESS NOTES
Cardiothoracic Surgery Progress Note      POD #: 1-left great toe transmetatarsal amputation primary closure     LOS: 7 days      Subjective: Awake and alert    Objective:  Vital Signs vital signs below noted T-max past 24 hours 98.7 °F  Temp:  [97.3 °F (36.3 °C)-98.7 °F (37.1 °C)] 97.9 °F (36.6 °C)  Heart Rate:  [65-86] 66  Resp:  [16-18] 16  BP: (124-170)/(70-90) 130/83    Physical Exam:   General Appearance: Oriented x3   Lungs:   Heart:   Skin:   Incision: Amputation site dressing change.  Sutures intact skin margin viable skin flaps are viable.     Results:  Results from last 7 days   Lab Units 08/01/22  0918   WBC 10*3/mm3 7.81   HEMOGLOBIN g/dL 13.4   HEMATOCRIT % 40.0   PLATELETS 10*3/mm3 264     Results from last 7 days   Lab Units 08/02/22  1416   SODIUM mmol/L 137   POTASSIUM mmol/L 4.2   CHLORIDE mmol/L 100   CO2 mmol/L 22.0   BUN mg/dL 22   CREATININE mg/dL 1.33*   GLUCOSE mg/dL 288*   CALCIUM mg/dL 8.7         Assessment: #1.  Postop day 1 left great toe transmetatarsal amputation secondary to osteomyelitis  2.  Status post remote coronary bypass grafting  3.  Postop pacemaker placement for sick sinus syndrome  4 recent fall left shoulder dislocation reduced in the emergency department  5.  Prior endovascular stenting/angioplasty right lower extremity per Dr. Hernandez in 2019      Plan: Continue daily dressing change amputation site.  Medical manage per hospitalist.  Antibiotics per infectious disease.      Gopal Márquez MD - 08/03/22 - 06:29 EDT

## 2022-08-03 NOTE — PROGRESS NOTES
" [facility] - Progress Note    Jermaine JIMENES Francisco  1945  S383/1    08/03/22, 09:21 EDT      Chief Complaint: Following for PAD, HTN    Subjective:   POD #1 s/p Left Grt toe transmetatarsal amputation  Sitting up in bed - no pain.  Very pleased with surgery.  PT starts tomorrow.        Review of Systems:  Pertinent positives are listed above and in physical exam.  All others have been reviewed and are negative.    amLODIPine, 10 mg, Oral, Daily  aspirin, 81 mg, Oral, Daily  cetirizine, 5 mg, Oral, Daily  heparin (porcine), 5,000 Units, Subcutaneous, Q8H  insulin detemir, 30 Units, Subcutaneous, Nightly  insulin lispro, 0-7 Units, Subcutaneous, TID AC  Insulin Lispro, 8 Units, Subcutaneous, TID With Meals  levothyroxine, 50 mcg, Oral, Q AM  lisinopril, 5 mg, Oral, Q24H  metoprolol succinate XL, 50 mg, Oral, Q24H  sodium chloride, 10 mL, Intravenous, Q12H  vitamin D3, 5,000 Units, Oral, Daily        Objective:  Vitals:   height is 190.5 cm (75\") and weight is 98 kg (216 lb). His oral temperature is 97.9 °F (36.6 °C). His blood pressure is 139/78 and his pulse is 69. His respiration is 18 and oxygen saturation is 93%.       Intake/Output Summary (Last 24 hours) at 8/3/2022 0921  Last data filed at 8/3/2022 0800  Gross per 24 hour   Intake 1100 ml   Output 650 ml   Net 450 ml       Physical Exam:  General:  WN, NAD, A and O x3.  CV:  Normal S1,S2. No murmur, rub, or gallop.  Resp:  CTA Soy, equal, nonlabored.  Abd:  Soft, + BS, no organomegaly. Nontender to palpation.  Extrem:  No edema BLE, 2+ pedal/PT pulses.   Dressing noted Left foot - clean, intact.  Foot in boot.         Results from last 7 days   Lab Units 08/03/22  0549   WBC 10*3/mm3 12.97*   HEMOGLOBIN g/dL 11.7*   HEMATOCRIT % 35.0*   PLATELETS 10*3/mm3 271     Results from last 7 days   Lab Units 08/03/22  0549 07/28/22  0348 07/27/22  2041   SODIUM mmol/L 136   < > 138   POTASSIUM mmol/L 4.1   < > 4.5   CHLORIDE mmol/L 100   < > 102   CO2 " mmol/L 23.0   < > 26.0   BUN mg/dL 28*   < > 22   CREATININE mg/dL 1.89*   < > 1.05   CALCIUM mg/dL 8.3*   < > 9.2   BILIRUBIN mg/dL  --   --  0.2   ALK PHOS U/L  --   --  81   ALT (SGPT) U/L  --   --  19   AST (SGOT) U/L  --   --  15   GLUCOSE mg/dL 300*   < > 273*    < > = values in this interval not displayed.                       Tele:  NSR    Assessment and Plan:  1.  Peripheral Vascular Disease              -  Remote Bilateral iliac stenting with significant infrapopliteal disease, good collateral flow.              - He has excellent pulses bilaterally by doppler              -  POD 1 p Left great toe transmetatarsal amputation.     2.  CAD              -  Remote CABG              -  Currently asymptomatic.              -  Continue ASA     3.  Osteomyelitis              -  Left toe wound being followed by podiatrist as outpt with ortho shoe/oral antibiotics                     -  Elevated inflammatory markers/leukocytosis upon arrival with XRay suggesting osteo.              -  IV Antibiotics per ID.  Dr. Márquez will continue to follow. Amputation recommended.     4.  DMII              -  Per primary service.     5.  Essential Hypertension              -  Poorly controlled              -  Continue home dose Toprol XL 50mg daily.  Trial of low dose Lisinopril - Cr is now 1.89.  Will discontinue ACEI with [place].  Increase Norvasc to 10mg daily and continue to monitor.      6.  Hyperlipidemia              -  Check lipid status              -  Will address statin.     7.  Left shoulder dislocation              -  S/P fall prompting ED visit/admission.              -  PT/OT              76 yo CM with known CAD/CABG, PVDz with prior stenting.  Presents after fall resulting in left shoulder dislocation as well as presenting with left toe wound.  Inflammatory markers and WBC elevated, Xray suggests osteomyelitis.  He has known PVDz with severe infrapopliteal disease.  However, he has bilateral collateral flow and good  pulses by doppler bilaterally.  At this time, there is no need for revascularization of the LLE and we recommended amputation.  PO course is stable.  BP better controlled with ACEI but [place] noted.  Will DC ACEI and increase CCB.         I discussed the patient's findings and my recommendations with the patient, any present family members, and the nursing staff.  Cooper Apodaca MD saw and examined patient, verified hx and PE, read all radiographic studies, reviewed labs and micro data, and formulated dx, plan for treatment and all medical decision making.      Megan Shukla PA-C  08/03/22, 09:21 EDT

## 2022-08-04 LAB
ANION GAP SERPL CALCULATED.3IONS-SCNC: 10 MMOL/L (ref 5–15)
BUN SERPL-MCNC: 32 MG/DL (ref 8–23)
BUN/CREAT SERPL: 15.4 (ref 7–25)
CALCIUM SPEC-SCNC: 8.3 MG/DL (ref 8.6–10.5)
CHLORIDE SERPL-SCNC: 105 MMOL/L (ref 98–107)
CHOLEST SERPL-MCNC: 180 MG/DL (ref 0–200)
CO2 SERPL-SCNC: 25 MMOL/L (ref 22–29)
CREAT SERPL-MCNC: 2.08 MG/DL (ref 0.76–1.27)
CYTO UR: NORMAL
DEPRECATED RDW RBC AUTO: 41.4 FL (ref 37–54)
EGFRCR SERPLBLD CKD-EPI 2021: 32.2 ML/MIN/1.73
ERYTHROCYTE [DISTWIDTH] IN BLOOD BY AUTOMATED COUNT: 12.7 % (ref 12.3–15.4)
GLUCOSE BLDC GLUCOMTR-MCNC: 142 MG/DL (ref 70–130)
GLUCOSE BLDC GLUCOMTR-MCNC: 231 MG/DL (ref 70–130)
GLUCOSE BLDC GLUCOMTR-MCNC: 251 MG/DL (ref 70–130)
GLUCOSE BLDC GLUCOMTR-MCNC: 272 MG/DL (ref 70–130)
GLUCOSE BLDC GLUCOMTR-MCNC: 73 MG/DL (ref 70–130)
GLUCOSE SERPL-MCNC: 165 MG/DL (ref 65–99)
HCT VFR BLD AUTO: 36.4 % (ref 37.5–51)
HDLC SERPL-MCNC: 39 MG/DL (ref 40–60)
HGB BLD-MCNC: 12.1 G/DL (ref 13–17.7)
LAB AP CASE REPORT: NORMAL
LAB AP CLINICAL INFORMATION: NORMAL
LDLC SERPL CALC-MCNC: 113 MG/DL (ref 0–100)
LDLC/HDLC SERPL: 2.79 {RATIO}
MCH RBC QN AUTO: 29.4 PG (ref 26.6–33)
MCHC RBC AUTO-ENTMCNC: 33.2 G/DL (ref 31.5–35.7)
MCV RBC AUTO: 88.6 FL (ref 79–97)
PATH REPORT.FINAL DX SPEC: NORMAL
PATH REPORT.GROSS SPEC: NORMAL
PLATELET # BLD AUTO: 237 10*3/MM3 (ref 140–450)
PMV BLD AUTO: 11.3 FL (ref 6–12)
POTASSIUM SERPL-SCNC: 4.2 MMOL/L (ref 3.5–5.2)
RBC # BLD AUTO: 4.11 10*6/MM3 (ref 4.14–5.8)
SODIUM SERPL-SCNC: 140 MMOL/L (ref 136–145)
TRIGL SERPL-MCNC: 160 MG/DL (ref 0–150)
VANCOMYCIN SERPL-MCNC: 19.5 MCG/ML (ref 5–40)
VLDLC SERPL-MCNC: 28 MG/DL (ref 5–40)
WBC NRBC COR # BLD: 11.13 10*3/MM3 (ref 3.4–10.8)

## 2022-08-04 PROCEDURE — 63710000001 INSULIN LISPRO (HUMAN) PER 5 UNITS: Performed by: PHYSICIAN ASSISTANT

## 2022-08-04 PROCEDURE — 80061 LIPID PANEL: CPT | Performed by: PHYSICIAN ASSISTANT

## 2022-08-04 PROCEDURE — 99232 SBSQ HOSP IP/OBS MODERATE 35: CPT | Performed by: PHYSICIAN ASSISTANT

## 2022-08-04 PROCEDURE — 63710000001 INSULIN DETEMIR PER 5 UNITS: Performed by: PHYSICIAN ASSISTANT

## 2022-08-04 PROCEDURE — 97535 SELF CARE MNGMENT TRAINING: CPT

## 2022-08-04 PROCEDURE — 80202 ASSAY OF VANCOMYCIN: CPT | Performed by: INTERNAL MEDICINE

## 2022-08-04 PROCEDURE — 99024 POSTOP FOLLOW-UP VISIT: CPT | Performed by: THORACIC SURGERY (CARDIOTHORACIC VASCULAR SURGERY)

## 2022-08-04 PROCEDURE — 97164 PT RE-EVAL EST PLAN CARE: CPT

## 2022-08-04 PROCEDURE — 97168 OT RE-EVAL EST PLAN CARE: CPT

## 2022-08-04 PROCEDURE — 82962 GLUCOSE BLOOD TEST: CPT

## 2022-08-04 PROCEDURE — 97530 THERAPEUTIC ACTIVITIES: CPT

## 2022-08-04 PROCEDURE — 25010000002 CEFEPIME PER 500 MG: Performed by: INTERNAL MEDICINE

## 2022-08-04 PROCEDURE — 80048 BASIC METABOLIC PNL TOTAL CA: CPT | Performed by: PHYSICIAN ASSISTANT

## 2022-08-04 PROCEDURE — 25010000002 HEPARIN (PORCINE) PER 1000 UNITS: Performed by: PHYSICIAN ASSISTANT

## 2022-08-04 PROCEDURE — 25010000002 PIPERACILLIN SOD-TAZOBACTAM PER 1 G: Performed by: INTERNAL MEDICINE

## 2022-08-04 PROCEDURE — 25010000002 DAPTOMYCIN PER 1 MG: Performed by: INTERNAL MEDICINE

## 2022-08-04 PROCEDURE — 85027 COMPLETE CBC AUTOMATED: CPT | Performed by: PHYSICIAN ASSISTANT

## 2022-08-04 RX ORDER — SODIUM CHLORIDE 9 MG/ML
100 INJECTION, SOLUTION INTRAVENOUS CONTINUOUS
Status: DISCONTINUED | OUTPATIENT
Start: 2022-08-04 | End: 2022-08-08

## 2022-08-04 RX ORDER — INSULIN LISPRO 100 [IU]/ML
5 INJECTION, SOLUTION INTRAVENOUS; SUBCUTANEOUS
Status: DISCONTINUED | OUTPATIENT
Start: 2022-08-04 | End: 2022-08-13

## 2022-08-04 RX ADMIN — INSULIN LISPRO 8 UNITS: 100 INJECTION, SOLUTION INTRAVENOUS; SUBCUTANEOUS at 08:01

## 2022-08-04 RX ADMIN — ASPIRIN 81 MG CHEWABLE TABLET 81 MG: 81 TABLET CHEWABLE at 08:01

## 2022-08-04 RX ADMIN — TAZOBACTAM SODIUM AND PIPERACILLIN SODIUM 3.38 G: 375; 3 INJECTION, SOLUTION INTRAVENOUS at 05:27

## 2022-08-04 RX ADMIN — HYDROCODONE BITARTRATE AND ACETAMINOPHEN 1 TABLET: 5; 325 TABLET ORAL at 15:27

## 2022-08-04 RX ADMIN — TAZOBACTAM SODIUM AND PIPERACILLIN SODIUM 3.38 G: 375; 3 INJECTION, SOLUTION INTRAVENOUS at 13:19

## 2022-08-04 RX ADMIN — INSULIN LISPRO 5 UNITS: 100 INJECTION, SOLUTION INTRAVENOUS; SUBCUTANEOUS at 18:20

## 2022-08-04 RX ADMIN — HEPARIN SODIUM 5000 UNITS: 5000 INJECTION INTRAVENOUS; SUBCUTANEOUS at 05:27

## 2022-08-04 RX ADMIN — LEVOTHYROXINE SODIUM 50 MCG: 50 TABLET ORAL at 05:27

## 2022-08-04 RX ADMIN — CEFEPIME 2 G: 2 INJECTION, POWDER, FOR SOLUTION INTRAVENOUS at 16:10

## 2022-08-04 RX ADMIN — INSULIN LISPRO 4 UNITS: 100 INJECTION, SOLUTION INTRAVENOUS; SUBCUTANEOUS at 16:55

## 2022-08-04 RX ADMIN — HYDROCODONE BITARTRATE AND ACETAMINOPHEN 1 TABLET: 5; 325 TABLET ORAL at 21:10

## 2022-08-04 RX ADMIN — Medication 5000 UNITS: at 08:01

## 2022-08-04 RX ADMIN — Medication 1 SPRAY: at 16:10

## 2022-08-04 RX ADMIN — SODIUM CHLORIDE 35 ML/HR: 4.5 INJECTION, SOLUTION INTRAVENOUS at 08:00

## 2022-08-04 RX ADMIN — HEPARIN SODIUM 5000 UNITS: 5000 INJECTION INTRAVENOUS; SUBCUTANEOUS at 13:19

## 2022-08-04 RX ADMIN — INSULIN DETEMIR 30 UNITS: 100 INJECTION, SOLUTION SUBCUTANEOUS at 21:11

## 2022-08-04 RX ADMIN — AMLODIPINE BESYLATE 10 MG: 10 TABLET ORAL at 08:01

## 2022-08-04 RX ADMIN — Medication 10 ML: at 08:00

## 2022-08-04 RX ADMIN — SODIUM CHLORIDE 100 ML/HR: 9 INJECTION, SOLUTION INTRAVENOUS at 11:35

## 2022-08-04 RX ADMIN — HEPARIN SODIUM 5000 UNITS: 5000 INJECTION INTRAVENOUS; SUBCUTANEOUS at 21:08

## 2022-08-04 RX ADMIN — METOPROLOL SUCCINATE 50 MG: 50 TABLET, EXTENDED RELEASE ORAL at 08:01

## 2022-08-04 RX ADMIN — DAPTOMYCIN 600 MG: 500 INJECTION, POWDER, LYOPHILIZED, FOR SOLUTION INTRAVENOUS at 21:08

## 2022-08-04 RX ADMIN — CETIRIZINE HYDROCHLORIDE 5 MG: 10 TABLET, FILM COATED ORAL at 08:01

## 2022-08-04 NOTE — PLAN OF CARE
Goal Outcome Evaluation:  Plan of Care Reviewed With: patient        Progress: no change  Outcome Evaluation: OT re-eval completed s/p L great toe amputation. Pt completed bed mobility w/ CGA and v/c. MaxA x 2 for squat pivot from EOB to BSC. Pt demo difficulty w/ sequencing/adherence to NWB status on the LLE. Mod/MaxA for UB/LB and toileting tasks. Recommendation upon d/c for IPR.

## 2022-08-04 NOTE — THERAPY RE-EVALUATION
Patient Name: Jermaine Singh  : 1945    MRN: [id number]                              Today's Date: 2022       Admit Date: 2022    Visit Dx:     ICD-10-CM ICD-9-CM   1. Dislocation of left shoulder joint, initial encounter  S43.005A 831.00   2. Cellulitis of left foot  L03.116 682.7   3. 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  Preserved LV function   • Type 2 diabetes mellitus ([facility])   • Hypertension   • Hyperlipidemia   • Hypothyroidism (acquired)   • Vitamin D deficiency on Rx    • Serum Cr 1.32 on admission.  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 Au   • Dizziness   • Atherosclerosis of native artery of both lower extremities with intermittent claudication ([facility])   • SSS (sick sinus syndrome) ([facility])   • Dislocation of left shoulder joint, initial encounter   • Cellulitis in diabetic foot ([facility])   • Fall   • Sepsis ([facility])   • Leukocytosis   • Lactic acidosis   • Elevated C-reactive protein (CRP)   • Elevated sed rate (elev SR)   • Proximal phalanx fracture of the second digit extending into the second metatarsal joint     Past Medical History:   Diagnosis Date   • Asthma    • Coronary artery disease    • Diabetes mellitus ([facility])     started on inuslin 2018; started on po meds in ; checking blood sugars daily    • Disease of thyroid gland     po meds daily for hypothyroidism    • History of fracture as a child     rt leg- severe    • Hyperlipidemia    • Hypertension    • Hypothyroidism    • Peripheral neuropathy    • Peripheral vascular disease ([facility])     s/p angiogram -needs stent in left leg    • RBBB    • Right knee pain    • Vitamin D deficiency      Past Surgical History:   Procedure Laterality Date   • APPENDECTOMY     • CARDIAC CATHETERIZATION N/A 2019    Procedure: Left Heart Cath;  Surgeon: Cooper Apodaca  MD ALEXY;  Location:  [facility] CATH INVASIVE LOCATION;  Service: Cardiology   • CARDIAC ELECTROPHYSIOLOGY PROCEDURE N/A 5/18/2022    Procedure: DEVICE IMPLANT;  Surgeon: Cooper Apodaca MD;  Location:  [facility] CATH INVASIVE LOCATION;  Service: Cardiology;  Laterality: N/A;   • COLONOSCOPY     • CORONARY ARTERY BYPASS GRAFT N/A 3/22/2019    Procedure: MEDIAN STERNOTOMY, CORONARY ARTERY BYPASS GRAFT X3, UTILIZING THE LEFT INTERNAL MAMMARY ARTERY, EVH AND OPEN HARVEST OF THE RIGHT GREATER SAPHENOUS VEIN, EXPLORATION OF THE LEFT LEG;  Surgeon: Ap Au MD;  Location:  [facility] OR;  Service: Cardiothoracic   • EYE SURGERY Bilateral     cataracts    • INTERVENTIONAL RADIOLOGY PROCEDURE N/A 7/29/2021    Procedure: Abdominal Aortagram with Runoff;  Surgeon: Jermaine Hernandez MD;  Location:  [facility] CATH INVASIVE LOCATION;  Service: Cardiovascular;  Laterality: N/A;   • KNEE ARTHROSCOPY      right x 2, left x 1   • LACERATION REPAIR      right leg   • LEG SURGERY      2 for fracture of rt leg    • TONSILLECTOMY      Adnoidectomy   • TRANS METATARSAL AMPUTATION Left 8/2/2022    Procedure: GREAT TOE AMPUTATION LEFT;  Surgeon: Gopal Márquez MD;  Location:  [facility] OR;  Service: Vascular;  Laterality: Left;      General Information    No documentation.                  Mobility/ADL's    No documentation.                Obj/Interventions    No documentation.                Goals/Plan     Row Name 08/04/22 1534          Transfer Goal 1 (OT)    Activity/Assistive Device (Transfer Goal 1, OT) toilet;commode, bedside without drop arms;bed-to-chair/chair-to-bed  -MR     [place] Level/Cues Needed (Transfer Goal 1, OT) moderate assist (50-74% patient effort)  -MR     Time Frame (Transfer Goal 1, OT) by discharge  -MR     Strategies/Barriers (Transfers Goal 1, OT) while adhering to NWB status on the LLE.  -MR     Progress/Outcome (Transfer Goal 1, OT) goal revised this date  -MR     Row Name 08/04/22 1534          Dressing  Goal 1 (OT)    Activity/Device (Dressing Goal 1, OT) upper body dressing  -MR     [place]/Cues Needed (Dressing Goal 1, OT) minimum assist (75% or more patient effort);verbal cues required  -MR     Time Frame (Dressing Goal 1, OT) by discharge  -MR     Strategies/Barriers (Dressing Goal 1, OT) LUE donna-dressing strategies  -MR     Progress/Outcome (Dressing Goal 1, OT) goal ongoing  -MR     Row Name 08/04/22 1534          ROM Goal 1 (OT)    ROM Goal 1 (OT) Pt demonstrates independence with LUE ROM HEP avoiding ABDuction and ER per MD parameters  -MR     Time Frame (ROM Goal 1, OT) by discharge  -MR     Progress/Outcome (ROM Goal 1, OT) goal ongoing;medical status inhibiting progress  -MR     Row Name 08/04/22 1534          Therapy Assessment/Plan (OT)    Planned Therapy Interventions (OT) activity tolerance training;adaptive equipment training;BADL retraining;functional balance retraining;IADL retraining;occupation/activity based interventions;patient/caregiver education/training;transfer/mobility retraining;strengthening exercise;ROM/therapeutic exercise  -MR           User Key  (r) = Recorded By, (t) = Taken By, (c) = Cosigned By    Initials Name Provider Type    Rosalinda Domingo, OT Occupational Therapist               Clinical Impression    No documentation.                Outcome Measures    No documentation.                 Occupational Therapy Education                 Title: PT OT SLP Therapies (Done)     Topic: Occupational Therapy (Done)     Point: ADL training (Done)     Description:   Instruct learner(s) on proper safety adaptation and remediation techniques during self care or transfers.   Instruct in proper use of assistive devices.              Learning Progress Summary           Patient Acceptance, E, VU by MR at 8/4/2022 2184      Show all documentation for this point (4)                 Point: Home exercise program (Done)     Description:   Instruct learner(s) on appropriate technique for  monitoring, assisting and/or progressing therapeutic exercises/activities.              Learning Progress Summary           Patient Acceptance, E, VU by MR at 8/4/2022 1535      Show all documentation for this point (4)                 Point: Precautions (Done)     Description:   Instruct learner(s) on prescribed precautions during self-care and functional transfers.              Learning Progress Summary           Patient Acceptance, E, VU by MR at 8/4/2022 1535      Show all documentation for this point (4)                 Point: Body mechanics (Done)     Description:   Instruct learner(s) on proper positioning and spine alignment during self-care, functional mobility activities and/or exercises.              Learning Progress Summary           Patient Acceptance, E, VU by MR at 8/4/2022 1535      Show all documentation for this point (1)                             User Key     Initials Effective Dates Name Provider Type Discipline    MR 10/06/21 -  Rosalinda Silva OT Occupational Therapist OT              OT Recommendation and Plan  Planned Therapy Interventions (OT): activity tolerance training, adaptive equipment training, BADL retraining, functional balance retraining, IADL retraining, occupation/activity based interventions, patient/caregiver education/training, transfer/mobility retraining, strengthening exercise, ROM/therapeutic exercise  Therapy Frequency (OT): daily  Plan of Care Review  Plan of Care Reviewed With: patient  Progress: no change  Outcome Evaluation: OT re-eval completed s/p L great toe amputation. Pt completed bed mobility w/ CGA and v/c. MaxA x 2 for squat pivot from EOB to BSC. Pt demo difficulty w/ sequencing/adherence to NWB status on the LLE. Mod/MaxA for UB/LB and toileting tasks. Recommendation upon d/c for IPR.     Time Calculation:    Time Calculation- OT     Row Name 08/05/22 0801             Time Calculation- OT    OT Start Time 1412  -MR      OT Received On 08/04/22  -       OT Goal Re-Cert Due Date 08/14/22  -            User Key  (r) = Recorded By, (t) = Taken By, (c) = Cosigned By    Initials Name Provider Type     Rajiv Silva OT Occupational Therapist              Therapy Charges for Today     Code Description Service Date Service Provider Modifiers Qty    [id number]  OT RE-EVAL 2 8/4/2022 Rajiv Silva OT GO 1    [id number]  OT SELF CARE/MGMT/TRAIN EA 15 MIN 8/4/2022 Rajiv Silva OT GO 1               RAJIV SILVA OT  8/5/2022

## 2022-08-04 NOTE — PROGRESS NOTES
Jermaine JIMENES Francisco  1945  [id number]    Date of Consult: 8/4/2022    Admission Date: 7/27/2022      Requesting Provider: No ref. provider found  Evaluating Physician: Sage Braga MD    Reason for Consultation: Evaluation of toe wound    History of present illness:    Patient is a 77 y.o. male with coronary disease history of CABG diabetes mellitus type 2 hypothyroidism hypertension hyperlipidemia prevascular disease presents the emergency room after having a fall and injuring left shoulder patient tripped while walking his dog.  Coincidentally patient wears an orthotic shoe as recommended by a podiatrist has been on oral doxycycline for a left toe wound we are asked to evaluate this patient to start on broad-spectrum antibiotics occluding vancomycin and Zosyn.  X-ray of left foot showed soft tissue swelling in the forefoot without obvious fracture or osteomyelitis.    Has history of pacemaker    History of vascular stent placed in right leg.    7/29/2022; is having bone scan today denies fevers rash sore throat or diarrhea    7/30/22:  Bone scan compatible with osteomyelitis distal phalanx of first digit, acute fracture 2nd digit.  Afebrile. Blood cultures no growth to date.     7/31/22:  Afebrile.   Dr. Márquez consulting Dr. Apodaca for vascular studies left lower extremity.   No n/v/d.  Complains of shoulder pain.     8/2/22; doing well; had surgery today; toe amputation, no fever, rash, sore throat    8/3/22; no events overnight; resting quietly no events overnight    8/4/22; doing well; no events overnight; no fever, rash, sore throat;   Past Medical History:   Diagnosis Date   • Asthma    • Coronary artery disease    • Diabetes mellitus (HCC) 2000    started on inuslin 12/2018; started on po meds in 2000; checking blood sugars daily    • Disease of thyroid gland     po meds daily for hypothyroidism    • History of fracture as a child     rt leg- severe    • Hyperlipidemia    • Hypertension     • Hypothyroidism    • Peripheral neuropathy    • Peripheral vascular disease (HCC)     s/p angiogram 2/19-needs stent in left leg    • RBBB    • Right knee pain    • Vitamin D deficiency        Past Surgical History:   Procedure Laterality Date   • APPENDECTOMY     • CARDIAC CATHETERIZATION N/A 2/14/2019    Procedure: Left Heart Cath;  Surgeon: Cooper Apodaca MD;  Location:  [facility] CATH INVASIVE [facility];  Service: Cardiology   • CARDIAC ELECTROPHYSIOLOGY PROCEDURE N/A 5/18/2022    Procedure: DEVICE IMPLANT;  Surgeon: Cooper Apodaca MD;  Location:  [facility];  Service: Cardiology;  Laterality: N/A;   • COLONOSCOPY     • CORONARY ARTERY BYPASS GRAFT N/A 3/22/2019    Procedure: MEDIAN STERNOTOMY, CORONARY ARTERY BYPASS GRAFT X3, UTILIZING THE LEFT INTERNAL MAMMARY ARTERY, EVH AND OPEN HARVEST OF THE RIGHT GREATER SAPHENOUS VEIN, EXPLORATION OF THE LEFT LEG;  Surgeon: Ap Au MD;  Location:  [facility];  Service: Cardiothoracic   • EYE SURGERY Bilateral     cataracts    • INTERVENTIONAL RADIOLOGY PROCEDURE N/A 7/29/2021    Procedure: Abdominal Aortagram with Runoff;  Surgeon: Jermaine Hernandez MD;  Location:  [facility] INV[facility];  Service: Cardiovascular;  Laterality: N/A;   • KNEE ARTHROSCOPY      right x 2, left x 1   • LACERATION REPAIR      right leg   • LEG SURGERY      2 for fracture of rt leg    • TONSILLECTOMY      Adnoidectomy   • TRANS METATARSAL AMPUTATION Left 8/2/2022    Procedure: GREAT TOE AMPUTATION LEFT;  Surgeon: Gopal Márquez MD;  Location:  [facility];  Service: Vascular;  Laterality: Left;       Family History   Problem Relation Age of Onset   • Coronary artery disease Mother    • Diabetes Mother    • Cancer Father        Social History     Socioeconomic History   • Marital status:    • Number of children: 2   Tobacco Use   • Smoking status: Never Smoker   • Smokeless tobacco: Never Used   Substance and Sexual Activity   • Alcohol use: No    • Drug use: No   • Sexual activity: Defer       No Known Allergies      Medication:    Current Facility-Administered Medications:   •  acetaminophen (TYLENOL) tablet 650 mg, 650 mg, Oral, Q4H PRN, 650 mg at 07/29/22 1426 **OR** acetaminophen (TYLENOL) 160 MG/5ML solution 650 mg, 650 mg, Oral, Q4H PRN **OR** acetaminophen (TYLENOL) suppository 650 mg, 650 mg, Rectal, Q4H PRN, Nishant Allan PA  •  amLODIPine (NORVASC) tablet 10 mg, 10 mg, Oral, Daily, Nishant Allan PA, 10 mg at 08/04/22 0801  •  aspirin chewable tablet 81 mg, 81 mg, Oral, Daily, Nishant Allan PA, 81 mg at 08/04/22 0801  •  bisacodyl (DULCOLAX) suppository 10 mg, 10 mg, Rectal, Daily PRN, Nishant Allan PA  •  [START ON 8/5/2022] cefepime (MAXIPIME) 1 g/100 mL 0.9% NS IVPB (mbp), 1 g, Intravenous, Q12H, Sage Braga MD  •  cefepime (MAXIPIME) 2 g/100 mL 0.9% NS (mbp), 2 g, Intravenous, Once, Sage Braga MD  •  cetirizine (zyrTEC) tablet 5 mg, 5 mg, Oral, Daily, Nishant Allan PA, 5 mg at 08/04/22 0801  •  DAPTOmycin (CUBICIN) 600 mg in sodium chloride 0.9 % 50 mL IVPB, 6 mg/kg, Intravenous, Q24H, Sage Braga MD  •  dextrose (D50W) (25 g/50 mL) IV injection 25 g, 25 g, Intravenous, Q15 Min PRN, Nishant Allan PA  •  dextrose (GLUTOSE) oral gel 15 g, 15 g, Oral, Q15 Min PRN, Nishant Allan PA  •  glucagon (human recombinant) (GLUCAGEN DIAGNOSTIC) injection 1 mg, 1 mg, Intramuscular, Q15 Min PRN, Nishant Allan PA  •  heparin (porcine) 5000 UNIT/ML injection 5,000 Units, 5,000 Units, Subcutaneous, Q8H, Nishant Allan PA, 5,000 Units at 08/04/22 1319  •  HYDROcodone-acetaminophen (NORCO) 5-325 MG per tablet 1 tablet, 1 tablet, Oral, Q4H PRN, Peter Braga, , 1 tablet at 08/04/22 1527  •  insulin detemir (LEVEMIR) injection 30 Units, 30 Units, Subcutaneous, Nightly, Melissa Nguyen PA-C, 30 Units at 08/03/22 2114  •  Insulin Lispro (humaLOG) injection 0-7 Units, 0-7 Units, Subcutaneous, TID AC,  Melissa Nguyen PA-C, 2 Units at 08/03/22 1714  •  Insulin Lispro (humaLOG) injection 5 Units, 5 Units, Subcutaneous, TID With Meals, Melissa Nguyen PA-C  •  levothyroxine (SYNTHROID, LEVOTHROID) tablet 50 mcg, 50 mcg, Oral, Q AM, Nishant Allan PA, 50 mcg at 08/04/22 0527  •  metoprolol succinate XL (TOPROL-XL) 24 hr tablet 50 mg, 50 mg, Oral, Q24H, Nishant Allan PA, 50 mg at 08/04/22 0801  •  ondansetron (ZOFRAN) tablet 4 mg, 4 mg, Oral, Q6H PRN **OR** ondansetron (ZOFRAN) injection 4 mg, 4 mg, Intravenous, Q6H PRN, Nishant Allan PA  •  Pharmacy to dose vancomycin, , Does not apply, Continuous PRN, Sage Braga MD  •  phenol (CHLORASEPTIC) 1.4 % liquid 1 spray, 1 spray, Mouth/Throat, Q2H PRN, Megan Shukla PA  •  sennosides-docusate (PERICOLACE) 8.6-50 MG per tablet 2 tablet, 2 tablet, Oral, BID PRN, Nishant Allan PA  •  [COMPLETED] Insert peripheral IV, , , Once **AND** sodium chloride 0.9 % flush 10 mL, 10 mL, Intravenous, PRN, Nishant Allan PA  •  sodium chloride 0.9 % flush 10 mL, 10 mL, Intravenous, Q12H, Nishant Allan PA, 10 mL at 08/04/22 0800  •  sodium chloride 0.9 % flush 10 mL, 10 mL, Intravenous, PRN, Nishant Allan PA  •  sodium chloride 0.9 % infusion, 100 mL/hr, Intravenous, Continuous, Melissa Nguyen PA-C, Last Rate: 100 mL/hr at 08/04/22 1135, 100 mL/hr at 08/04/22 1135  •  vitamin D3 capsule 5,000 Units, 5,000 Units, Oral, Daily, Nishant Allan PA, 5,000 Units at 08/04/22 0801    Facility-Administered Medications Ordered in Other Encounters:   •  Chlorhexidine Gluconate Cloth 2 % pads 1 application, 1 application, Topical, Q12H PRN, Mohan Arauz, PA    Antibiotics:  Anti-Infectives (From admission, onward)    Ordered     Dose/Rate Route Frequency Start Stop    08/04/22 1550  cefepime (MAXIPIME) 1 g/100 mL 0.9% NS IVPB (mbp)        Ordering Provider: Sage Braga MD    1 g  over 4 Hours Intravenous Every 12 Hours 08/05/22 0045  22 0044    22 1550  cefepime (MAXIPIME) 2 g/100 mL 0.9% NS (mbp)        Ordering Provider: Sage Braga MD    2 g  200 mL/hr over 30 Minutes Intravenous Once 22 1645      22 1550  DAPTOmycin (CUBICIN) 600 mg in sodium chloride 0.9 % 50 mL IVPB        Ordering Provider: Sage Braga MD    6 mg/kg × 98 kg  100 mL/hr over 30 Minutes Intravenous Every 24 Hours 22 1645 22 1644    22 1111  vancomycin 1500 mg/500 mL 0.9% NS IVPB ([facility])        Ordering Provider: Nishant Allan PA    15 mg/kg × 98 kg Intravenous Once 22 2200 22 2130    22 1209  vancomycin in dextrose 5% 150 mL (VANCOCIN) IVPB 750 mg        Ordering Provider: Miguel Bray RPH    750 mg Intravenous Every 12 Hours 22 2200 22 0934    22 1209  vancomycin in dextrose 5% 150 mL (VANCOCIN) IVPB 750 mg        Ordering Provider: Miguel Bray RPH    750 mg  over 60 Minutes Intravenous Every 12 Hours 22 1300 22 1422    22 0014  piperacillin-tazobactam (ZOSYN) 3.375 g in iso-osmotic dextrose 50 ml (premix)        Ordering Provider: Eliana Hightower APRN    3.375 g  over 4 Hours Intravenous Every 8 Hours 22 0800 22 0303    22 0014  Pharmacy to dose vancomycin        Ordering Provider: Sage Braga MD     Does not apply Continuous PRN 22 0014 22 0013    22 2208  piperacillin-tazobactam (ZOSYN) 3.375 g in iso-osmotic dextrose 50 ml (premix)        Ordering Provider: Donny Hightower APRN    3.375 g  over 30 Minutes Intravenous Once 22 2210 22 0320    22 2208  vancomycin 2000 mg/500 mL 0.9% NS IVPB ([facility])        Ordering Provider: Donny Hightower APRN    20 mg/kg × 98 kg Intravenous Once 22 0040            Review of Systems:  See HPI      Physical Exam:   Vital Signs  Temp (24hrs), Av.1 °F (36.7 °C), Min:97.9 °F (36.6 °C), Max:98.4 °F (36.9 °C)    Temp  Min: 97.9 °F  (36.6 °C)  Max: 98.4 °F (36.9 °C)  BP  Min: 121/70  Max: 163/81  Pulse  Min: 60  Max: 70  Resp  Min: 16  Max: 16  SpO2  Min: 90 %  Max: 96 %    GENERAL: resting quietly  in no acute distress.   HEENT: Normocephalic, atraumatic.  PERRL. EOMI. No conjunctival injection. No icterus. Oropharynx clear without evidence of thrush or exudate. No evidence of peridontal disease.    HEART: RRR; No murmur,  LUNGS: Clear to auscultation bilaterally   ABDOMEN: Soft, nontender,   :  Without Fonseca catheter.  MSK: Left foot wrapped  SKIN: Warm and dry without cutaneous eruptions on Inspection/palpation.        Laboratory Data    Results from last 7 days   Lab Units 08/04/22  0539 08/03/22  0549 08/01/22  0918   WBC 10*3/mm3 11.13* 12.97* 7.81   HEMOGLOBIN g/dL 12.1* 11.7* 13.4   HEMATOCRIT % 36.4* 35.0* 40.0   PLATELETS 10*3/mm3 237 271 264     Results from last 7 days   Lab Units 08/04/22  0539   SODIUM mmol/L 140   POTASSIUM mmol/L 4.2   CHLORIDE mmol/L 105   CO2 mmol/L 25.0   BUN mg/dL 32*   CREATININE mg/dL 2.08*   GLUCOSE mg/dL 165*   CALCIUM mg/dL 8.3*                         Results from last 7 days   Lab Units 08/04/22  0539 08/03/22  0549 08/01/22  0918 07/29/22  1211   VANCOMYCIN TR mcg/mL  --   --   --  9.80   VANCOMYCIN RM mcg/mL 19.50 25.50 12.80  --      Estimated Creatinine Clearance: 41.2 mL/min (A) (by C-G formula based on SCr of 2.08 mg/dL (H)).      Microbiology:  No results found for: ACANTHNAEG, AFBCX, BPERTUSSISCX, BLOODCX  No results found for: BCIDPCR, CXREFLEX, CSFCX, CULTURETIS  No results found for: CULTURES, HSVCX, URCX  No results found for: EYECULTURE, GCCX, HSVCULTURE, LABHSV  No results found for: LEGIONELLA, MRSACX, MUMPSCX, MYCOPLASCX  No results found for: NOCARDIACX, STOOLCX  No results found for: THROATCX, UNSTIMCULT, URINECX, CULTURE, VZVCULTUR  No results found for: VIRALCULTU, WOUNDCX        Radiology:  Imaging Results (Last 72 Hours)     ** No results found for the last 72 hours. **             Impression:   Leukocytosis with neutrophilia, improved   Lactic acidosis  Left great toe wound- osteomyelitis by bone scan- amputation recommended by Dr. Márquez   Status post fall  Diabetes mellitus type 2  Left shoulder dislocation  Probable peripheral vascular disease  Acute renal failure    PLAN/RECOMMENDATIONS:     D/c vanco    D/c zosyn    Change to dapto 6 mg/kg iv q24h    Start cefepime 1 g iv q12h    Ivf      D/w hospitalist  Sage Braga MD  8/4/2022  15:51 EDT

## 2022-08-04 NOTE — PLAN OF CARE
Goal Outcome Evaluation:  Plan of Care Reviewed With: patient         Pt VSS. A&O x4. RA. C/O pain controlled with PRN meds. Bedrest orders discontinued. Pt tolerated being in the chair. Small BM this shift. Voiding spontaneously with adequate UOP.

## 2022-08-04 NOTE — PROGRESS NOTES
Cardiothoracic Surgery Progress Note      POD #: 2-left great toe transmetatarsal amputation primary closure     LOS: 8 days      Subjective: Awake and alert    Objective:  Vital Signs vital signs below noted T-max past 24 hours 98.7 °F  Temp:  [97.8 °F (36.6 °C)-98.4 °F (36.9 °C)] 97.9 °F (36.6 °C)  Heart Rate:  [60-76] 63  Resp:  [16-18] 16  BP: (121-139)/(60-83) 127/70    Physical Exam:   General Appearance: Oriented x3   Lungs:   Heart:   Skin:   Incision: Amputation site dressing change.  Sutures intact and skin margin viable and skin flaps are viable.     Results:  Results from last 7 days   Lab Units 08/03/22  0549   WBC 10*3/mm3 12.97*   HEMOGLOBIN g/dL 11.7*   HEMATOCRIT % 35.0*   PLATELETS 10*3/mm3 271     Results from last 7 days   Lab Units 08/03/22  0549   SODIUM mmol/L 136   POTASSIUM mmol/L 4.1   CHLORIDE mmol/L 100   CO2 mmol/L 23.0   BUN mg/dL 28*   CREATININE mg/dL 1.89*   GLUCOSE mg/dL 300*   CALCIUM mg/dL 8.3*         Assessment: #1.  Postop day 2 left great toe transmetatarsal amputation secondary to osteomyelitis with primary closure  2 status post remote coronary bypass grafting  3 status post pacemaker placement for sick sinus syndrome  4.  Recent fall with left shoulder dislocation reduced in the emergency department  5.  Prior endovascular stenting/angioplasty right lower extremity per Dr. Hernandez in 2019      Plan:      Gopal Márquez MD - 08/04/22 - 05:57 EDT

## 2022-08-04 NOTE — THERAPY RE-EVALUATION
Patient Name: Jermaine Singh  : 1945    MRN: [id number]                              Today's Date: 2022       Admit Date: 2022    Visit Dx:     ICD-10-CM ICD-9-CM   1. Dislocation of left shoulder joint, initial encounter  S43.005A 831.00   2. Cellulitis of left foot  L03.116 682.7   3. 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  Preserved LV function   • Type 2 diabetes mellitus ([facility])   • Hypertension   • Hyperlipidemia   • Hypothyroidism (acquired)   • Vitamin D deficiency on Rx    • Serum Cr 1.32 on admission.  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 Au   • Dizziness   • Atherosclerosis of native artery of both lower extremities with intermittent claudication ([facility])   • SSS (sick sinus syndrome) ([facility])   • Dislocation of left shoulder joint, initial encounter   • Cellulitis in diabetic foot ([facility])   • Fall   • Sepsis ([facility])   • Leukocytosis   • Lactic acidosis   • Elevated C-reactive protein (CRP)   • Elevated sed rate (elev SR)   • Proximal phalanx fracture of the second digit extending into the second metatarsal joint     Past Medical History:   Diagnosis Date   • Asthma    • Coronary artery disease    • Diabetes mellitus ([facility])     started on in[place] 2018; started on po meds in ; checking blood sugars daily    • Disease of thyroid gland     po meds daily for hypothyroidism    • History of fracture as a child     rt leg- severe    • Hyperlipidemia    • Hypertension    • Hypothyroidism    • Peripheral neuropathy    • Peripheral vascular disease ([facility])     s/p angiogram -needs stent in left leg    • RBBB    • Right knee pain    • Vitamin D deficiency      Past Surgical History:   Procedure Laterality Date   • APPENDECTOMY     • CARDIAC CATHETERIZATION N/A 2019    Procedure: Left Heart Cath;  Surgeon: Cooper Apodaca  "MD ALEXY;  Location:  [facility] CATH INVASIVE [facility]ATION;  Service: Cardiology   • CARDIAC ELECTROPHYSIOLOGY PROCEDURE N/A 5/18/2022    Procedure: DEVICE IMPLANT;  Surgeon: Cooper Apodaca MD;  Location:  [facility] CATH INVASIVE LOCATION;  Service: Cardiology;  Laterality: N/A;   • COLONOSCOPY     • CORONARY ARTERY BYPASS GRAFT N/A 3/22/2019    Procedure: MEDIAN STERNOTOMY, CORONARY ARTERY BYPASS GRAFT X3, UTILIZING THE LEFT INTERNAL MAMMARY ARTERY, EVH AND OPEN HARVEST OF THE RIGHT GREATER SAPHENOUS VEIN, EXPLORATION OF THE LEFT LEG;  Surgeon: Ap Au MD;  Location:  [facility] OR;  Service: Cardiothoracic   • EYE SURGERY Bilateral     cataracts    • INTERVENTIONAL RADIOLOGY PROCEDURE N/A 7/29/2021    Procedure: Abdominal Aortagram with Runoff;  Surgeon: Jermaine Hernandez MD;  Location:  [facility] CATH INVASIVE LOCATION;  Service: Cardiovascular;  Laterality: N/A;   • KNEE ARTHROSCOPY      right x 2, left x 1   • LACERATION REPAIR      right leg   • LEG SURGERY      2 for fracture of rt leg    • TONSILLECTOMY      Adnoidectomy   • TRANS METATARSAL AMPUTATION Left 8/2/2022    Procedure: GREAT TOE AMPUTATION LEFT;  Surgeon: Gopal Márquez MD;  Location:  [facility] OR;  Service: Vascular;  Laterality: Left;      General Information     Row Name 08/04/22 6052          Physical Therapy Time and Intention    Document Type re-evaluation  -     Mode of Treatment physical therapy  -     Row Name 08/04/22 3621          General Information    Patient Profile Reviewed yes  -HP     Prior Level of Function --  see IE  -HP     Existing Precautions/Restrictions fall;pacemaker;left;non-weight bearing  s/p mechanical fall with L shoulder dislocation reduced in ED, sling for comfort, per ortho consult pt cleared for LUE ROM \"avoiding ABDuction and ER.\" S/P L great toe amputation 8/2 toe-offloading shoe w/ mobility. Pt cleared for WBAT on the LUE  -HP     Row Name 08/04/22 1506          Cognition    Orientation Status (Cognition) " oriented x 4  -     Row Name 08/04/22 1624          Safety Issues, Functional Mobility    Impairments Affecting Function (Mobility) balance;endurance/activity tolerance;pain;strength;sensation/sensory awareness;postural/trunk control  -           User Key  (r) = Recorded By, (t) = Taken By, (c) = Cosigned By    Initials Name Provider Type    Thelma Kramer PT Physical Therapist               Mobility     Row Name 08/04/22 1625          Bed Mobility    Comment, (Bed Mobility) on [facility]  -[facility] Name 08/04/22 1625          Transfers    Comment, (Transfers) Pt performed STS from [facility] with Max A x2 and FWW. VC for hand placement and sequencing. Manual assist to encourage NWB on LLE with offloading shoe donned. Pt demonstrated significant difficulty to maintain NWB on LLE despite VC and manual assist when coming to stand however was able to keep foot off floor while static standing with FWW. Pt unable to pivot to chair, chair brought up behind pt. Activity limited by weakness and fatigue.  -[facility] Name 08/04/22 1625          Bed-Chair Transfer    Bed-Chair [place] (Transfers) maximum assist (25% patient effort);2 person assist  -     Assistive Device (Bed-Chair Transfers) walker, front-wheeled  -[facility] Name 08/04/22 1625          Sit-Stand Transfer    Sit-Stand [place] (Transfers) maximum assist (25% patient effort);2 person assist  -     Assistive Device (Sit-Stand Transfers) walker, front-wheeled  -[facility] Name 08/04/22 1625          Gait/Stairs (Locomotion)    [place] Level (Gait) not tested  -[facility] Name 08/04/22 1625          Mobility    Extremity Weight-bearing Status left upper extremity;left lower extremity  -     Left Upper Extremity (Weight-bearing Status) weight-bearing as tolerated (WBAT)  -     Left Lower Extremity (Weight-bearing Status) non weight-bearing (NWB)  -           User Key  (r) = Recorded By, (t) = Taken By, (c) = Cosigned By    Initials Name  Provider Type     Thelma Rich PT Physical Therapist               Obj/Interventions     Row Name 08/04/22 1628          Range of Motion Comprehensive    General Range of Motion no range of motion deficits identified  -     Row Name 08/04/22 1628          Balance    Balance Assessment sitting static balance;sitting dynamic balance;sit to stand dynamic balance;standing static balance  -     Static Sitting Balance standby assist  -     Dynamic Sitting Balance standby assist  -     Position, Sitting Balance sitting in chair  -     Static Standing Balance minimal assist  -     Dynamic Standing Balance maximum assist;2-person assist  -     Position/Device Used, Standing Balance supported;walker, rolling  -     Balance Interventions sitting;standing;sit to stand;occupation based/functional task  -           User Key  (r) = Recorded By, (t) = Taken By, (c) = Cosigned By    Initials Name Provider Type     Thelma Rich PT Physical Therapist               Goals/Plan     Row Name 08/04/22 1632          Bed Mobility Goal 1 (PT)    Activity/Assistive Device (Bed Mobility Goal 1, PT) sit to supine/supine to sit  -     [place] Level/Cues Needed (Bed Mobility Goal 1, PT) independent  -HP     Time Frame (Bed Mobility Goal 1, PT) long term goal (LTG);10 days  -HP     Progress/Outcomes (Bed Mobility Goal 1, PT) goal ongoing  -     Row Name 08/04/22 1632          Transfer Goal 1 (PT)    Activity/Assistive Device (Transfer Goal 1, PT) sit-to-stand/stand-to-sit;bed-to-chair/chair-to-bed  -HP     [place] Level/Cues Needed (Transfer Goal 1, PT) contact guard required  -     Time Frame (Transfer Goal 1, PT) long term goal (LTG);10 days  -HP     Progress/Outcome (Transfer Goal 1, PT) goal ongoing  -     Row Name 08/04/22 1632          Gait Training Goal 1 (PT)    Progress/Outcome (Gait Training Goal 1, PT) goal no longer appropriate  -     Row Name 08/04/22 1632          Stairs Goal 1 (PT)     Progress/Outcome (Stairs Goal 1, PT) goal no longer appropriate  -     Row Name 08/04/22 1632          Therapy Assessment/Plan (PT)    Planned Therapy Interventions (PT) balance training;bed mobility training;gait training;home exercise program;patient/family education;stretching;strengthening;stair training;transfer training;wheelchair management/propulsion training  -           User Key  (r) = Recorded By, (t) = Taken By, (c) = Cosigned By    Initials Name Provider Type     Thelma Rich, PT Physical Therapist               Clinical Impression     Row Name 08/04/22 1629          Pain    Pretreatment Pain Rating 0/10 - no pain  -     Posttreatment Pain Rating 0/10 - no pain  -     Row Name 08/04/22 1629          Plan of Care Review    Plan of Care Reviewed With patient  -     Progress no change  -     Outcome Evaluation PT re-eval complete. Pt performed STS from [facility] with Max Ax2 and FWW. Manual assist required to encourage NWB on LLE. Pt demonstrated difficulty maintaining NWB status despite max VC and manual assist. Offloading shoe donned prior to mobility for safety. Pt educated on precautions and WB status on L UE/LE. Recommend d/c to IRF when medically appropriate.  -     Row Name 08/04/22 1629          Therapy Assessment/Plan (PT)    Rehab Potential (PT) good, to achieve stated therapy goals  -     Criteria for Skilled Interventions Met (PT) yes;meets criteria;skilled treatment is necessary  -     Therapy Frequency (PT) daily  -     Row Name 08/04/22 1629          Vital Signs    Pre Systolic BP Rehab --  VSS  -HP     Pre Patient Position Sitting  -HP     Intra Patient Position Standing  -HP     Post Patient Position Sitting  -     Row Name 08/04/22 1629          Positioning and Restraints    Pre-Treatment Position bedside commode  -HP     Post Treatment Position chair  -HP     In Chair notified nsg;reclined;sitting;call light within reach;encouraged to call for assist;exit alarm on;on  mechanical lift sling;waffle cushion;legs elevated  -           User Key  (r) = Recorded By, (t) = Taken By, (c) = Cosigned By    Initials Name Provider Type     Thelma Rich, PT Physical Therapist               Outcome Measures     Row Name 08/04/22 1633 08/04/22 0800       How much help from another person do you currently need...    Turning from your back to your side while in flat bed without using bedrails? 3  -HP 3  -SR    Moving from lying on back to sitting on the side of a flat bed without bedrails? 3  -HP 3  -SR    Moving to and from a bed to a chair (including a wheelchair)? 1  -HP 3  -SR    Standing up from a chair using your arms (e.g., wheelchair, bedside chair)? 2  -HP 2  -SR    Climbing 3-5 steps with a railing? 1  -HP 2  -SR    To walk in hospital room? 1  - 3  -SR    AM-PAC 6 Clicks Score (PT) 11  - 16  -SR    Highest level of mobility 4 --> Transferred to chair/commode  - 5 --> Static standing  -SR    Row Name 08/04/22 1633 08/04/22 1535       Functional Assessment    Outcome Measure Options AM-PAC 6 Clicks Basic Mobility (PT)  - AM-PAC 6 Clicks Daily Activity (OT)  -MR          User Key  (r) = Recorded By, (t) = Taken By, (c) = Cosigned By    Initials Name Provider Type    Prerna Burnett RN Registered Nurse    Thelma Kramer, PT Physical Therapist    Rosalinda Domingo, OT Occupational Therapist                             Physical Therapy Education                 Title: PT OT SLP Therapies (Done)     Topic: Physical Therapy (Done)     Point: Mobility training (Done)     Learning Progress Summary           Patient Acceptance, E,D, VU,NR by  at 8/4/2022 1633      Show all documentation for this point (3)                 Point: Home exercise program (Done)     Learning Progress Summary           Patient Acceptance, E,D, VU,NR by  at 8/4/2022 1633      Show all documentation for this point (3)                 Point: Body mechanics (Done)     Learning Progress Summary            Patient Acceptance, E,D, VU,NR by  at 8/4/2022 1633      Show all documentation for this point (3)                 Point: Precautions (Done)     Learning Progress Summary           Patient Acceptance, E,D, VU,NR by  at 8/4/2022 1633      Show all documentation for this point (3)                             User Key     Initials Effective Dates Name Provider Type Discipline     06/01/21 -  Thelma Rich PT Physical Therapist PT              PT Recommendation and Plan  Planned Therapy Interventions (PT): balance training, bed mobility training, gait training, home exercise program, patient/family education, stretching, strengthening, stair training, transfer training, wheelchair management/propulsion training  Plan of Care Reviewed With: patient  Progress: no change  Outcome Evaluation: PT re-eval complete. Pt performed STS from [facility] with Max Ax2 and FWW. Manual assist required to encourage NWB on LLE. Pt demonstrated difficulty maintaining NWB status despite max VC and manual assist. Offloading shoe donned prior to mobility for safety. Pt educated on precautions and WB status on L UE/LE. Recommend d/c to IRF when medically appropriate.     Time Calculation:    PT Charges     Row Name 08/04/22 1420             Time Calculation    Start Time 1420  -HP      PT Received On 08/04/22  -      PT Goal Re-Cert Due Date 08/14/22  -              Timed Charges    10995 - PT Therapeutic Activity Minutes 10  -HP              Untimed Charges    PT Eval/Re-eval Minutes 32  -HP              Total Minutes    Timed Charges Total Minutes 10  -HP      Untimed Charges Total Minutes 32  -HP       Total Minutes 42  -HP            User Key  (r) = Recorded By, (t) = Taken By, (c) = Cosigned By    Initials Name Provider Type     Thelma Rich PT Physical Therapist              Therapy Charges for Today     Code Description Service Date Service Provider Modifiers Qty    10463511145 HC PT THERAPEUTIC ACT EA 15 MIN 8/4/2022  Thelma Rich, PT GP 1    [id number]  PT RE-EVAL ESTABLISHED PLAN 2 8/4/2022 Thelma Rich, PT GP 1          PT G-Codes  Outcome Measure Options: AM-PAC 6 Clicks Basic Mobility (PT)  AM-PAC 6 Clicks Score (PT): 11  AM-PAC 6 Clicks Score (OT): 16    Thelma Rich PT  8/4/2022

## 2022-08-04 NOTE — PLAN OF CARE
Goal Outcome Evaluation:  Plan of Care Reviewed With: patient        Progress: improving     Pt is alert and oriented X 4. VSS, RA. Slept throughout the night. Voiding using urinal. Heels boot in place.

## 2022-08-04 NOTE — PROGRESS NOTES
" [facility] - Progress Note    Jermaine JIMENES Francisco  1945  S383/1    08/04/22, 09:16  EDT      Chief Complaint: Following for PAD, HTN    Subjective:   POD #2 s/p Left Grt toe transmetatarsal amputation   C/O sore throat.  Slept well.  Pain controlled.  PT to start today.      Review of Systems:  Pertinent positives are listed above and in physical exam.  All others have been reviewed and are negative.    amLODIPine, 10 mg, Oral, Daily  aspirin, 81 mg, Oral, Daily  cetirizine, 5 mg, Oral, Daily  heparin (porcine), 5,000 Units, Subcutaneous, Q8H  insulin detemir, 30 Units, Subcutaneous, Nightly  insulin lispro, 0-7 Units, Subcutaneous, TID AC  Insulin Lispro, 8 Units, Subcutaneous, TID With Meals  levothyroxine, 50 mcg, Oral, Q AM  metoprolol succinate XL, 50 mg, Oral, Q24H  piperacillin-tazobactam, 3.375 g, Intravenous, Q8H  sodium chloride, 10 mL, Intravenous, Q12H  vitamin D3, 5,000 Units, Oral, Daily        Objective:  Vitals:   height is 190.5 cm (75\") and weight is 98 kg (216 lb). His oral temperature is 98.2 °F (36.8 °C). His blood pressure is 143/82 and his pulse is 66. His respiration is 16 and oxygen saturation is 96%.       Intake/Output Summary (Last 24 hours) at 8/4/2022 0916  Last data filed at 8/4/2022 0750  Gross per 24 hour   Intake 490 ml   Output 1950 ml   Net -1460 ml       Physical Exam:  General:  WN, NAD, A and O x3.  CV:  Normal S1,S2. No murmur, rub, or gallop.  Resp:  CTA S[place], equal, nonlabored.  Abd:  Soft, + BS, no organomegaly. Nontender to palpation.  Extrem:  No edema BLE, 2+ pedal/PT pulses.   Dressing noted Left foot - clean, intact.  Foot in boot.         Results from last 7 days   Lab Units 08/04/22  0539   WBC 10*3/mm3 11.13*   HEMOGLOBIN g/dL 12.1*   HEMATOCRIT % 36.4*   PLATELETS 10*3/mm3 237     Results from last 7 days   Lab Units 08/04/22  0539   SODIUM mmol/L 140   POTASSIUM mmol/L 4.2   CHLORIDE mmol/L 105   CO2 mmol/L 25.0   BUN mg/dL 32*   CREATININE mg/dL " 2.08*   CALCIUM mg/dL 8.3*   GLUCOSE mg/dL 165*             Results from last 7 days   Lab Units 08/04/22  0539   CHOLESTEROL mg/dL 180   TRIGLYCERIDES mg/dL 160*   HDL CHOL mg/dL 39*   LDL CHOL mg/dL 113*           Tele:  NSR    Assessment and Plan:  1.  Peripheral Vascular Disease              -  Remote Bilateral iliac stenting with significant infrapopliteal disease, good collateral flow.              - He has excellent pulses bilaterally by doppler              -  POD 2 p Left great toe transmetatarsal amputation.     2.  CAD              -  Remote CABG              -  Currently asymptomatic.              -  Continue ASA     3.  Osteomyelitis              -  Left toe wound being followed by podiatrist as outpt with ortho shoe/oral antibiotics                     -  Elevated inflammatory markers/leukocytosis upon arrival with XRay suggesting osteo.              -  IV Antibiotics per ID.  Dr. Márquez will continue to follow. Amputation recommended.       4.  DMII              -  Per primary service.       5.  Essential Hypertension              -  Better controlled              -  Continue home dose Toprol XL 50mg daily.  Trialed low dose Lisinopril with rise in Cr.   Discontinued ACEI.   Increased Norvasc to 10mg daily and continue to monitor.      6.  Hyperlipidemia              -  Check lipid status              -  Will address statin.     7.  Left shoulder dislocation              -  S/P fall prompting ED visit/admission.              -  PT/OT         8.  [place]   -  Cr 2.08 today   -  ACEI discontinued.   -  Continue with daily labs.       76 yo CM with known CAD/CABG, PVDz with prior stenting.  Presents after fall resulting in left shoulder dislocation as well as presenting with left toe wound.  Inflammatory markers and WBC elevated, Xray suggests osteomyelitis.  He has known PVDz with severe infrapopliteal disease.  However, he has bilateral collateral flow and good pulses by doppler bilaterally.  At this time,  there is no need for revascularization of the LLE and we recommended amputation.  PO course is stable.  BP better controlled with ACEI but [place] noted.  DC'd ACEI and increased CCB with better control.         I discussed the patient's findings and my recommendations with the patient, any present family members, and the nursing staff.  Cooper Apodaca MD saw and examined patient, verified hx and PE, read all radiographic studies, reviewed labs and micro data, and formulated dx, plan for treatment and all medical decision making.      Megan Shukla PA-C  08/04/22, 09:21 EDT

## 2022-08-04 NOTE — PROGRESS NOTES
Pharmacy Consult-Vancomycin Dosing  Jermaine Singh is a  77 y.o. male receiving vancomycin therapy.     Indication: SSTI  Consulting Provider: hospitalist  ID Consult: Yes    Goal AUC: 400 - 600 mg/L*hr    Current Antimicrobial Therapy  Anti-Infectives (From admission, onward)      Ordered     Dose/Rate Route Frequency Start Stop    08/03/22 1608  piperacillin-tazobactam (ZOSYN) 3.375 g in iso-osmotic dextrose 50 ml (premix)        Ordering Provider: Sage Braga MD    3.375 g  over 4 Hours Intravenous Every 8 Hours 08/03/22 2200 08/10/22 2159    08/02/22 1111  vancomycin 1500 mg/500 mL 0.9% NS IVPB ([facility])        Ordering Provider: Nishant Allan PA    15 mg/kg × 98 kg Intravenous Once 08/02/22 2200 08/02/22 2130    08/01/22 1209  vancomycin in dextrose 5% 150 mL (VANCOCIN) IVPB 750 mg        Ordering Provider: Miguel Bray RPH    750 mg Intravenous Every 12 Hours 08/01/22 2200 08/02/22 0934    08/01/22 1209  vancomycin in dextrose 5% 150 mL (VANCOCIN) IVPB 750 mg        Ordering Provider: Miguel Bray RPH    750 mg  over 60 Minutes Intravenous Every 12 Hours 08/01/22 1300 08/01/22 1422    07/28/22 0014  piperacillin-tazobactam (ZOSYN) 3.375 g in iso-osmotic dextrose 50 ml (premix)        Ordering Provider: Eliana Hightower APRN    3.375 g  over 4 Hours Intravenous Every 8 Hours 07/28/22 0800 08/02/22 0303    07/28/22 0014  Pharmacy to dose vancomycin        Ordering Provider: Sage Braga MD     Does not apply Continuous PRN 07/28/22 0014 08/11/22 0013    07/27/22 2208  piperacillin-tazobactam (ZOSYN) 3.375 g in iso-osmotic dextrose 50 ml (premix)        Ordering Provider: Donny Hightower APRN    3.375 g  over 30 Minutes Intravenous Once 07/27/22 2210 07/28/22 0320    07/27/22 2208  vancomycin 2000 mg/500 mL 0.9% NS IVPB ([facility])        Ordering Provider: Donny Hightower APRN    20 mg/kg × 98 kg Intravenous Once 07/27/22 2210 07/28/22 0040            Allergies  Allergies as of 07/27/2022     "(No Known Allergies)       Labs    Results from last 7 days   Lab Units 08/04/22  0539 08/03/22  0549 08/02/22  1416   BUN mg/dL 32* 28* 22   CREATININE mg/dL 2.08* 1.89* 1.33*       Results from last 7 days   Lab Units 08/04/22  0539 08/03/22  0549 08/01/22  0918   WBC 10*3/mm3 11.13* 12.97* 7.81       Evaluation of Dosing     Last Dose Received in the ED/Outside Facility: 2000mg in ED  Is Patient on Dialysis or Renal Replacement: -    Ht - 190.5 cm (75\")  Wt - 98 kg (216 lb)    Estimated Creatinine Clearance: 41.2 mL/min (A) (by C-G formula based on SCr of 2.08 mg/dL (H)).    Intake & Output (last 3 days)         08/01 0701 08/02 0700 08/02 0701 08/03 0700 08/03 0701 08/04 0700 08/04 0701 08/05 0700    P.O. 960 600 967     I.V. (mL/kg)  500 (5.1)      IV Piggyback 50       Total Intake(mL/kg) 1010 (10.3) 1100 (11.2) 967 (9.9)     Urine (mL/kg/hr) 1600 (0.7) 650 (0.3) 1900 (0.8) 250 (0.9)    Stool 0       Total Output 9846 640 1299 250    Net -590 +450 -933 -250            Urine Unmeasured Occurrence 1 x   1 x    Stool Unmeasured Occurrence 1 x               Microbiology and Radiology  Microbiology Results (last 10 days)       Procedure Component Value - Date/Time    Tissue / Bone Culture - Tissue, Toe, Left [[id number]] Collected: 08/02/22 1027    Lab Status: Preliminary result Specimen: Tissue from Toe, Left Updated: 08/03/22 1033     Tissue Culture Culture in progress     Gram Stain Moderate (3+) WBCs seen      No organisms seen    COVID PRE-OP / PRE-PROCEDURE SCREENING ORDER (NO ISOLATION) - Swab, Nasopharynx [[id number]]  (Normal) Collected: 07/27/22 2246    Lab Status: Final result Specimen: Swab from Nasopharynx Updated: 07/27/22 7017    Narrative:      The following orders were created for panel order COVID PRE-OP / PRE-PROCEDURE SCREENING ORDER (NO ISOLATION) - Swab, Nasopharynx.  Procedure                               Abnormality         Status                     ---------                              "  -----------         ------                     COVID-19 and FLU A/B PCR...[[id number]]  Normal              Final result                 Please view results for these tests on the individual orders.    COVID-19 and FLU A/B PCR - Swab, Nasopharynx [[id number]]  (Normal) Collected: 07/27/22 2246    Lab Status: Final result Specimen: Swab from Nasopharynx Updated: 07/27/22 2357     COVID19 Not Detected     Influenza A PCR Not Detected     Influenza B PCR Not Detected    Narrative:      Fact sheet for providers: https://www.fda.gov/media/520957/download    Fact sheet for patients: https://www.fda.gov/media/153635/download    Test performed by PCR.    Blood Culture - Blood, Hand, Left [[id number]]  (Normal) Collected: 07/27/22 2040    Lab Status: Final result Specimen: Blood from Hand, Left Updated: 08/01/22 2132     Blood Culture No growth at 5 days    Blood Culture - Blood, Hand, Right [[id number]]  (Normal) Collected: 07/27/22 2030    Lab Status: Final result Specimen: Blood from Hand, Right Updated: 08/01/22 2132     Blood Culture No growth at 5 days            Reported Vancomycin Levels    Results from last 7 days   Lab Units 08/04/22  0539 08/03/22  0549 08/01/22  0918   VANCOMYCIN RM mcg/mL 19.50 25.50 12.80             Results from last 7 days   Lab Units 07/29/22  1211 07/28/22  1411   VANCOMYCIN TR mcg/mL 9.80 10.00          [employer] AUC Calculation:    Current AUC:  505 mg/L*hr    Predicted Steady State AUC :   1532 mg/L*hr    Predicted Steady State AUC on new dose:     Assessment/Plan:   1. Pharmacy to dose vancomycin for SSTI.   2. Patient received a loading dose of vancomycin 2000 mg on 7/28.   3. Patient was receiving maintenance dose of vancomycin 750 mg q12h. Note that patient received additional dose of 1500 mg for surgical prophylaxis on 8/2 @ 2200.   4. Vancomycin random was drawn appropriately and is at 19.5 mcg/mL on 8/4 @ 0539. There was not much clearance of Vancomycin from level that was obtained  on 8/3 (25.5) (Full 24 hours without additional Vancomycin doses)  5. Will hold off on giving Vancomycin dose again today due to continued clearance of 1500 mg dose given on 8/2. In addition Scr has bumped from 1.33 to 2.09 over the past 2 days, much less of a bump  today (1.89 to 2.09) compared to yesterday. Urine output has remained consistent despite recent increase in creatinine. Patient is also receiving Zosyn for empiric coverage. Will continue this for now and reassess renal function tomorrow. Hoping renal function improves once 1500 mg dose has been cleared.   6. Vancomycin random scheduled for 8/5 with AM labs to assess clearance of most recent dose. Will start new maintenance dose after level has been evaluated.   7. Monitor renal function, cultures and sensitivities, and clinical status, and adjust regimen as necessary.  Pharmacy will continue to follow.      Miguel Bray,  Candice  08/04/22  09:58 EDT

## 2022-08-04 NOTE — PLAN OF CARE
Goal Outcome Evaluation:  Plan of Care Reviewed With: patient        Progress: no change  Outcome Evaluation: PT re-eval complete. Pt performed STS from [facility] with Max Ax2 and FWW. Manual assist required to encourage NWB on LLE. Pt demonstrated difficulty maintaining NWB status despite max VC and manual assist. Offloading shoe donned prior to mobility for safety. Pt educated on precautions and WB status on L UE/LE. Recommend d/c to IRF when medically appropriate.

## 2022-08-04 NOTE — PROGRESS NOTES
[facility] Medicine Services  PROGRESS NOTE    Patient Name: Jermaine Singh  : 1945  MRN: [id number]    Date of Admission: 2022  Primary Care Physician: Farooq Painter MD    Subjective     CC: f/u L great toe amputation    HPI:  Lying in bed. Denies pain today. Patient believes his initial wound resulted from stepping on a tortoise shell his dogs were playing with in his back yard. He believes his diabetes complicated this initial injury and resulted in infection.     ROS:  Gen- No fevers, chills  CV- No chest pain, palpitations  Resp- No cough, dyspnea  GI- No N/V/D, abd pain    Objective     Vital Signs:   Temp:  [97.9 °F (36.6 °C)-98.4 °F (36.9 °C)] 98.2 °F (36.8 °C)  Heart Rate:  [60-70] 66  Resp:  [16-18] 16  BP: (121-143)/(70-83) 143/82     Physical Exam:  Constitutional: No acute distress, awake, alert and conversant. Lying in bed   HENT: NCAT, mucous membranes moist  Respiratory: Clear to auscultation bilaterally, respiratory effort normal on room air   Cardiovascular: RRR, no murmurs, rubs, or gallops. Palpable pedal pulses bilaterally   Gastrointestinal: Positive bowel sounds, soft, nontender, nondistended  Musculoskeletal: No bilateral ankle edema. L foot dressed by CT surgery, I did not remove dressings for exam  Psychiatric: Appropriate affect, cooperative  Neurologic: Oriented x 3, moves all extremities spontaneously, speech clear  Skin: No rashes    Results Reviewed:  LAB RESULTS:      Lab 22  0539 22  0549 22  0918 22  0533   WBC 11.13* 12.97* 7.81 8.39   HEMOGLOBIN 12.1* 11.7* 13.4 13.2   HEMATOCRIT 36.4* 35.0* 40.0 39.0   PLATELETS 237 271 264 233   NEUTROS ABS  --  10.89*  --  5.24   IMMATURE GRANS (ABS)  --  0.06*  --  0.02   LYMPHS ABS  --  0.99  --  1.88   MONOS ABS  --  0.94*  --  0.82   EOS ABS  --  0.01  --  0.36   MCV 88.6 86.6 87.1 87.1         Lab 22  0539 22  0549 22  1416 22  2129 22  0820  07/30/22  0635   SODIUM 140 136 137  --  137 138   POTASSIUM 4.2 4.1 4.2 4.4 4.0 4.0   CHLORIDE 105 100 100  --  103 104   CO2 25.0 23.0 22.0  --  26.0 28.0   ANION GAP 10.0 13.0 15.0  --  8.0 6.0   BUN 32* 28* 22  --  15 15   CREATININE 2.08* 1.89* 1.33*  --  1.18 1.10   EGFR 32.2* 36.1* 55.1*  --  63.6 69.1   GLUCOSE 165* 300* 288*  --  233* 220*   CALCIUM 8.3* 8.3* 8.7  --  8.7 8.6         Brief Urine Lab Results  (Last result in the past 365 days)      Color   Clarity   Blood   Leuk Est   Nitrite   Protein   CREAT   Urine HCG        02/27/22 1321 Dark Yellow   Clear   Negative   Negative   Negative   2+               Microbiology Results Abnormal     Procedure Component Value - Date/Time    Blood Culture - Blood, Hand, Left [[id number]]  (Normal) Collected: 07/27/22 2040    Lab Status: Final result Specimen: Blood from Hand, Left Updated: 08/01/22 2132     Blood Culture No growth at 5 days    Blood Culture - Blood, Hand, Right [[id number]]  (Normal) Collected: 07/27/22 2030    Lab Status: Final result Specimen: Blood from Hand, Right Updated: 08/01/22 2132     Blood Culture No growth at 5 days    COVID PRE-OP / PRE-PROCEDURE SCREENING ORDER (NO ISOLATION) - Swab, Nasopharynx [[id number]]  (Normal) Collected: 07/27/22 2246    Lab Status: Final result Specimen: Swab from Nasopharynx Updated: 07/27/22 2717    Narrative:      The following orders were created for panel order COVID PRE-OP / PRE-PROCEDURE SCREENING ORDER (NO ISOLATION) - Swab, Nasopharynx.  Procedure                               Abnormality         Status                     ---------                               -----------         ------                     COVID-19 and FLU A/B PCR...[[id number]]  Normal              Final result                 Please view results for these tests on the individual orders.    COVID-19 and FLU A/B PCR - Swab, Nasopharynx [[id number]]  (Normal) Collected: 07/27/22 2246    Lab Status: Final result Specimen: Swab from  Nasopharynx Updated: 07/27/22 4317     COVID19 Not Detected     Influenza A PCR Not Detected     Influenza B PCR Not Detected    Narrative:      Fact sheet for providers: https://www.fda.gov/media/926029/download    Fact sheet for patients: https://www.fda.gov/media/045607/download    Test performed by PCR.        No radiology results from the last 24 hrs  I have reviewed the medications:  Scheduled Meds:amLODIPine, 10 mg, Oral, Daily  aspirin, 81 mg, Oral, Daily  cetirizine, 5 mg, Oral, Daily  heparin (porcine), 5,000 Units, Subcutaneous, Q8H  insulin detemir, 30 Units, Subcutaneous, Nightly  insulin lispro, 0-7 Units, Subcutaneous, TID AC  Insulin Lispro, 5 Units, Subcutaneous, TID With Meals  levothyroxine, 50 mcg, Oral, Q AM  metoprolol succinate XL, 50 mg, Oral, Q24H  piperacillin-tazobactam, 3.375 g, Intravenous, Q8H  sodium chloride, 10 mL, Intravenous, Q12H  vitamin D3, 5,000 Units, Oral, Daily      Continuous Infusions:Pharmacy to dose vancomycin,   sodium chloride, 100 mL/hr, Last Rate: 100 mL/hr (08/04/22 1135)      PRN Meds:.•  acetaminophen **OR** acetaminophen **OR** acetaminophen  •  bisacodyl  •  dextrose  •  dextrose  •  glucagon (human recombinant)  •  HYDROcodone-acetaminophen  •  ondansetron **OR** ondansetron  •  Pharmacy to dose vancomycin  •  phenol  •  sennosides-docusate  •  [COMPLETED] Insert peripheral IV **AND** sodium chloride  •  sodium chloride    Assessment & Plan     Active Hospital Problems    Diagnosis  POA   • Proximal phalanx fracture of the second digit extending into the second metatarsal joint [S92.919A]  Unknown   • Dislocation of left shoulder joint, initial encounter [S43.005A]  Yes   • Cellulitis in diabetic foot (HCC) [E11.628, L03.119]  Yes   • Fall [W19.XXXA]  Yes   • Sepsis (HCC) [A41.9]  Yes   • Leukocytosis [D72.829]  Yes   • Lactic acidosis [E87.2]  Yes   • Elevated C-reactive protein (CRP) [R79.82]  Yes   • Elevated sed rate (elev SR) [R70.0]  Yes   • SSS (sick  sinus syndrome) (HCC) [I49.5]  Yes   • Hyperlipidemia [E78.5]  Yes   • Hypertension [I10]  Yes   • S/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 Au [Z95.1]  Not Applicable   • Hypothyroidism (acquired) [E03.9]  Yes   • Type 2 diabetes mellitus (HCC) [E11.9]  Yes      Resolved Hospital Problems   No resolved problems to display.     Brief Hospital Course to date:  Jermaine Singh is a 77 y.o. male with PMH significant for HTN, HLD, CAD s/p CABG, PVD s/p RLE stent, CKD III, insulin-dependent DMII and hypothyroidism. He was admitted to [facility] 7/27/22 for L shoulder dislocation after a fall as well as sepsis secondary to L great toe osteomyelitis.     L great toe cellulitis / osteomyelitis  Peripheral vascular disease   - Followed by Dr. Joseph of podiatry - was on PO Doxycycline outpatient  - Bone scan concerning for osteomyelitis of L great toe   - s/p L great toe amputation by Dr. Márquez 8/2/22   - Appreciate cardiology evaluation - arterial duplex obtained. Cardiology does not recommend revascularization at this time   - ID following - per 8/03 note, to continue IV Zosyn/Vancomycin. Discussed with pharmacy, holding Vanc due to [place] - may resume tomorrow    Fall   Anterior L shoulder dislocation  Acute fracture of the  proximal phalanx of the second digit extending into the second metatarsophalangeal joint  - s/p reduction in the ED  - Appreciate ortho (Dr. Gipson) assistance   - Recommend non-operative treatment. Sling for comfort.   - PT for range of motion exercises, avoid ABduction and external rotation. WBAT on LUE with walker or knee scooter  - Follow up with Dr. Gipson in 2-3 weeks     Insulin-dependent DMII  - Hgb A1c 9.0%  - Continue Levemir 30 units QHS and decrease Lispro to 5 units TID AC + SSI     [place]  - Baseline creatinine appears to be 0.9-1.2  - Creatinine 2.08 today  - Cardiology has stopped ACE  - Start NS @ 100mL/hour  - AM BMP      Hypertension  Coronary artery disease s/p CABG  SSS s/p  PPM   - Cardiology has stopped ACEI and added Amlodipine 10mg daily   - Continue Metoprolol XL 50mg daily      Hypothyroid  - Continue levothyroxine    Expected Discharge Location and Transportation: home vs rehab (pending PT/OT recommendations) via private vehicle   Expected Discharge Date: 8/6/22    DVT prophylaxis:Medical and mechanical DVT prophylaxis orders are present.     AM-PAC 6 Clicks Score (PT): 16 (08/04/22 0800)    CODE STATUS:   Code Status and Medical Interventions:   Ordered at: 07/28/22 0000     Code Status (Patient has no pulse and is not breathing):    CPR (Attempt to Resuscitate)     Medical Interventions (Patient has pulse or is breathing):    Full Support     Melissa Nguyen PA-C  08/04/22

## 2022-08-05 LAB
ANION GAP SERPL CALCULATED.3IONS-SCNC: 12 MMOL/L (ref 5–15)
BUN SERPL-MCNC: 38 MG/DL (ref 8–23)
BUN/CREAT SERPL: 16.2 (ref 7–25)
CALCIUM SPEC-SCNC: 8.1 MG/DL (ref 8.6–10.5)
CHLORIDE SERPL-SCNC: 107 MMOL/L (ref 98–107)
CO2 SERPL-SCNC: 21 MMOL/L (ref 22–29)
CREAT SERPL-MCNC: 2.35 MG/DL (ref 0.76–1.27)
DEPRECATED RDW RBC AUTO: 40.4 FL (ref 37–54)
EGFRCR SERPLBLD CKD-EPI 2021: 27.8 ML/MIN/1.73
ERYTHROCYTE [DISTWIDTH] IN BLOOD BY AUTOMATED COUNT: 12.9 % (ref 12.3–15.4)
GLUCOSE BLDC GLUCOMTR-MCNC: 111 MG/DL (ref 70–130)
GLUCOSE BLDC GLUCOMTR-MCNC: 216 MG/DL (ref 70–130)
GLUCOSE BLDC GLUCOMTR-MCNC: 230 MG/DL (ref 70–130)
GLUCOSE BLDC GLUCOMTR-MCNC: 270 MG/DL (ref 70–130)
GLUCOSE SERPL-MCNC: 214 MG/DL (ref 65–99)
HCT VFR BLD AUTO: 35.3 % (ref 37.5–51)
HGB BLD-MCNC: 12.1 G/DL (ref 13–17.7)
MCH RBC QN AUTO: 29.8 PG (ref 26.6–33)
MCHC RBC AUTO-ENTMCNC: 34.3 G/DL (ref 31.5–35.7)
MCV RBC AUTO: 86.9 FL (ref 79–97)
PLATELET # BLD AUTO: 229 10*3/MM3 (ref 140–450)
PMV BLD AUTO: 11.3 FL (ref 6–12)
POTASSIUM SERPL-SCNC: 4.4 MMOL/L (ref 3.5–5.2)
RBC # BLD AUTO: 4.06 10*6/MM3 (ref 4.14–5.8)
SODIUM SERPL-SCNC: 140 MMOL/L (ref 136–145)
WBC NRBC COR # BLD: 9.74 10*3/MM3 (ref 3.4–10.8)

## 2022-08-05 PROCEDURE — 63710000001 INSULIN LISPRO (HUMAN) PER 5 UNITS: Performed by: PHYSICIAN ASSISTANT

## 2022-08-05 PROCEDURE — 85027 COMPLETE CBC AUTOMATED: CPT | Performed by: PHYSICIAN ASSISTANT

## 2022-08-05 PROCEDURE — 99024 POSTOP FOLLOW-UP VISIT: CPT | Performed by: THORACIC SURGERY (CARDIOTHORACIC VASCULAR SURGERY)

## 2022-08-05 PROCEDURE — 25010000002 DAPTOMYCIN PER 1 MG: Performed by: INTERNAL MEDICINE

## 2022-08-05 PROCEDURE — 80048 BASIC METABOLIC PNL TOTAL CA: CPT | Performed by: PHYSICIAN ASSISTANT

## 2022-08-05 PROCEDURE — 82962 GLUCOSE BLOOD TEST: CPT

## 2022-08-05 PROCEDURE — 63710000001 INSULIN DETEMIR PER 5 UNITS: Performed by: PHYSICIAN ASSISTANT

## 2022-08-05 PROCEDURE — 99232 SBSQ HOSP IP/OBS MODERATE 35: CPT | Performed by: INTERNAL MEDICINE

## 2022-08-05 PROCEDURE — 97530 THERAPEUTIC ACTIVITIES: CPT

## 2022-08-05 PROCEDURE — 25010000002 CEFEPIME PER 500 MG: Performed by: INTERNAL MEDICINE

## 2022-08-05 PROCEDURE — 25010000002 HEPARIN (PORCINE) PER 1000 UNITS: Performed by: PHYSICIAN ASSISTANT

## 2022-08-05 PROCEDURE — 25010000002 HYDRALAZINE PER 20 MG: Performed by: PHYSICIAN ASSISTANT

## 2022-08-05 RX ORDER — HYDRALAZINE HYDROCHLORIDE 20 MG/ML
10 INJECTION INTRAMUSCULAR; INTRAVENOUS ONCE
Status: COMPLETED | OUTPATIENT
Start: 2022-08-05 | End: 2022-08-05

## 2022-08-05 RX ADMIN — INSULIN LISPRO 5 UNITS: 100 INJECTION, SOLUTION INTRAVENOUS; SUBCUTANEOUS at 08:14

## 2022-08-05 RX ADMIN — SODIUM CHLORIDE 100 ML/HR: 9 INJECTION, SOLUTION INTRAVENOUS at 02:46

## 2022-08-05 RX ADMIN — HYDROCODONE BITARTRATE AND ACETAMINOPHEN 1 TABLET: 5; 325 TABLET ORAL at 21:46

## 2022-08-05 RX ADMIN — INSULIN LISPRO 4 UNITS: 100 INJECTION, SOLUTION INTRAVENOUS; SUBCUTANEOUS at 17:15

## 2022-08-05 RX ADMIN — HEPARIN SODIUM 5000 UNITS: 5000 INJECTION INTRAVENOUS; SUBCUTANEOUS at 05:15

## 2022-08-05 RX ADMIN — METOPROLOL SUCCINATE 50 MG: 50 TABLET, EXTENDED RELEASE ORAL at 08:14

## 2022-08-05 RX ADMIN — ASPIRIN 81 MG CHEWABLE TABLET 81 MG: 81 TABLET CHEWABLE at 08:14

## 2022-08-05 RX ADMIN — LEVOTHYROXINE SODIUM 50 MCG: 50 TABLET ORAL at 05:15

## 2022-08-05 RX ADMIN — CEFEPIME HYDROCHLORIDE 1 G: 1 INJECTION, POWDER, FOR SOLUTION INTRAMUSCULAR; INTRAVENOUS at 17:15

## 2022-08-05 RX ADMIN — CEFEPIME HYDROCHLORIDE 1 G: 1 INJECTION, POWDER, FOR SOLUTION INTRAMUSCULAR; INTRAVENOUS at 05:14

## 2022-08-05 RX ADMIN — HYDROCODONE BITARTRATE AND ACETAMINOPHEN 1 TABLET: 5; 325 TABLET ORAL at 05:15

## 2022-08-05 RX ADMIN — CETIRIZINE HYDROCHLORIDE 5 MG: 10 TABLET, FILM COATED ORAL at 08:14

## 2022-08-05 RX ADMIN — HEPARIN SODIUM 5000 UNITS: 5000 INJECTION INTRAVENOUS; SUBCUTANEOUS at 14:45

## 2022-08-05 RX ADMIN — Medication 5000 UNITS: at 08:14

## 2022-08-05 RX ADMIN — HYDRALAZINE HYDROCHLORIDE 10 MG: 20 INJECTION INTRAMUSCULAR; INTRAVENOUS at 03:38

## 2022-08-05 RX ADMIN — DAPTOMYCIN 600 MG: 500 INJECTION, POWDER, LYOPHILIZED, FOR SOLUTION INTRAVENOUS at 14:45

## 2022-08-05 RX ADMIN — INSULIN LISPRO 3 UNITS: 100 INJECTION, SOLUTION INTRAVENOUS; SUBCUTANEOUS at 08:15

## 2022-08-05 RX ADMIN — AMLODIPINE BESYLATE 10 MG: 10 TABLET ORAL at 08:14

## 2022-08-05 RX ADMIN — INSULIN DETEMIR 30 UNITS: 100 INJECTION, SOLUTION SUBCUTANEOUS at 21:45

## 2022-08-05 RX ADMIN — HEPARIN SODIUM 5000 UNITS: 5000 INJECTION INTRAVENOUS; SUBCUTANEOUS at 21:46

## 2022-08-05 RX ADMIN — INSULIN LISPRO 5 UNITS: 100 INJECTION, SOLUTION INTRAVENOUS; SUBCUTANEOUS at 17:15

## 2022-08-05 RX ADMIN — SODIUM CHLORIDE 100 ML/HR: 9 INJECTION, SOLUTION INTRAVENOUS at 21:48

## 2022-08-05 NOTE — PLAN OF CARE
Goal Outcome Evaluation:  Plan of Care Reviewed With: patient        Progress: no change  Outcome Evaluation: OT facilitated pt in STS transfer training this date. DEP assist for donning L offloading shoe. Pt having significant difficulty w/ completing transfer and adhering to NWB status on the LLE despite physical assist (therapist foot under pt's) and max v/c. Attempted twice and pt unable to sequence or achieve full upright standing. Further transfer training discontinued. Will progress as able per current POC.

## 2022-08-05 NOTE — CASE MANAGEMENT/SOCIAL WORK
Continued Stay Note  [facility]     Patient Name: Jermaine Singh  MRN: [id number]  Today's Date: 8/5/2022    Admit Date: 7/27/2022     Discharge Plan     Row Name 08/05/22 1136       Plan    Plan Comments Spoke with pt who is agreeable for referrals to in rehab. Sheila with [facility] has been updated. CM to follow.               Discharge Codes    No documentation.               Expected Discharge Date and Time     Expected Discharge Date Expected Discharge Time    Aug 6, 2022             Norah Rivera RN

## 2022-08-05 NOTE — PLAN OF CARE
Goal Outcome Evaluation:      Pt opting to sleep in chair as laying in bed causes him too much pain. Pain well controlled with PRNs. Dependent pulses requiring Doppler. Hypertensive with SPB > 170. 1x dose IV hydralazine administered.

## 2022-08-05 NOTE — PROGRESS NOTES
Cardiothoracic Surgery Progress Note      POD #: 3-left great toe transmetatarsal amputation primary closure     LOS: 9 days      Subjective:  up in recliner chair    Objective:  Vital Signs vital signs below noted T-max past 24 hours 90.2 °F  Temp:  [97.6 °F (36.4 °C)-98.2 °F (36.8 °C)] 97.9 °F (36.6 °C)  Heart Rate:  [62-80] 80  Resp:  [16-18] 18  BP: (143-164)/(66-83) 164/78    Physical Exam:   General Appearance: Oriented x3   Lungs:   Heart:   Skin:   Incision: Amputation site dressing change.  Sutures intact skin margin viable skin flaps are viable.     Results:  Results from last 7 days   Lab Units 08/04/22  0539   WBC 10*3/mm3 11.13*   HEMOGLOBIN g/dL 12.1*   HEMATOCRIT % 36.4*   PLATELETS 10*3/mm3 237     Results from last 7 days   Lab Units 08/04/22  0539   SODIUM mmol/L 140   POTASSIUM mmol/L 4.2   CHLORIDE mmol/L 105   CO2 mmol/L 25.0   BUN mg/dL 32*   CREATININE mg/dL 2.08*   GLUCOSE mg/dL 165*   CALCIUM mg/dL 8.3*         Assessment: #1.  Postop day 3 left great toe transmetatarsal amputation with primary closure healing  2 status post remote coronary bypass grafting  3 status post pacemaker placement for sick sinus syndrome  4.  Recent fall left shoulder dislocation was reduced in the emergency department  5.  Prior endovascular stenting angioplasty right lower extremity by Dr. Hernandez in 2019      Plan: Continue daily dressing change amputation site.  Amputation site unloading as much as possible with bedrest.  Antibiotics per infectious disease.  Medical manage per hospitalist.      Gopal Márquez MD - 08/05/22 - 05:54 EDT

## 2022-08-05 NOTE — PROGRESS NOTES
Jermaine JIMENES Francisco  1945  [id number]    Date of Consult: 8/5/2022    Admission Date: 7/27/2022      Requesting Provider: No ref. provider found  Evaluating Physician: Sage rBaga MD    Reason for Consultation: Evaluation of toe wound    History of present illness:    Patient is a 77 y.o. male with coronary disease history of CABG diabetes mellitus type 2 hypothyroidism hypertension hyperlipidemia prevascular disease presents the emergency room after having a fall and injuring left shoulder patient tripped while walking his dog.  Coincidentally patient wears an orthotic shoe as recommended by a podiatrist has been on oral doxycycline for a left toe wound we are asked to evaluate this patient to start on broad-spectrum antibiotics occluding vancomycin and Zosyn.  X-ray of left foot showed soft tissue swelling in the forefoot without obvious fracture or osteomyelitis.    Has history of pacemaker    History of vascular stent placed in right leg.    7/29/2022; is having bone scan today denies fevers rash sore throat or diarrhea    7/30/22:  Bone scan compatible with osteomyelitis distal phalanx of first digit, acute fracture 2nd digit.  Afebrile. Blood cultures no growth to date.     7/31/22:  Afebrile.   Dr. Márquez consulting Dr. Apodaca for vascular studies left lower extremity.   No n/v/d.  Complains of shoulder pain.     8/2/22; doing well; had surgery today; toe amputation, no fever, rash, sore throat    8/3/22; no events overnight; resting quietly no events overnight    8/4/22; doing well; no events overnight; no fever, rash, sore throat;   8/5/22; no events overnight; no fever, rash, sore throat, no diarrhea  Past Medical History:   Diagnosis Date   • Asthma    • Coronary artery disease    • Diabetes mellitus (HCC) 2000    started on inuslin 12/2018; started on po meds in 2000; checking blood sugars daily    • Disease of thyroid gland     po meds daily for hypothyroidism    • History of  fracture as a child     rt leg- severe    • Hyperlipidemia    • Hypertension    • Hypothyroidism    • Peripheral neuropathy    • Peripheral vascular disease (HCC)     s/p angiogram 2/19-needs stent in left leg    • RBBB    • Right knee pain    • Vitamin D deficiency        Past Surgical History:   Procedure Laterality Date   • APPENDECTOMY     • CARDIAC CATHETERIZATION N/A 2/14/2019    Procedure: Left Heart Cath;  Surgeon: Cooper Apodaca MD;  Location:  [facility] CATH INVASIVE LOCATION;  Service: Cardiology   • CARDIAC ELECTROPHYSIOLOGY PROCEDURE N/A 5/18/2022    Procedure: DEVICE IMPLANT;  Surgeon: Cooper Apodaca MD;  Location: [facility] CATH INVASIVE LOCATION;  Service: Cardiology;  Laterality: N/A;   • COLONOSCOPY     • CORONARY ARTERY BYPASS GRAFT N/A 3/22/2019    Procedure: MEDIAN STERNOTOMY, CORONARY ARTERY BYPASS GRAFT X3, UTILIZING THE LEFT INTERNAL MAMMARY ARTERY, EVH AND OPEN HARVEST OF THE RIGHT GREATER SAPHENOUS VEIN, EXPLORATION OF THE LEFT LEG;  Surgeon: Ap Au MD;  Location: [facility] OR;  Service: Cardiothoracic   • EYE SURGERY Bilateral     cataracts    • INTERVENTIONAL RADIOLOGY PROCEDURE N/A 7/29/2021    Procedure: Abdominal Aortagram with Runoff;  Surgeon: Jermaine Hernandez MD;  Location: [facility] CATH INVASIVE LOCATION;  Service: Cardiovascular;  Laterality: N/A;   • KNEE ARTHROSCOPY      right x 2, left x 1   • LACERATION REPAIR      right leg   • LEG SURGERY      2 for fracture of rt leg    • TONSILLECTOMY      Adnoidectomy   • TRANS METATARSAL AMPUTATION Left 8/2/2022    Procedure: GREAT TOE AMPUTATION LEFT;  Surgeon: Gopal Márquez MD;  Location:  [facility];  Service: Vascular;  Laterality: Left;       Family History   Problem Relation Age of Onset   • Coronary artery disease Mother    • Diabetes Mother    • Cancer Father        Social History     Socioeconomic History   • Marital status:    • Number of children: 2   Tobacco Use   • Smoking status: Never Smoker   •  Smokeless tobacco: Never Used   Substance and Sexual Activity   • Alcohol use: No   • Drug use: No   • Sexual activity: Defer       No Known Allergies      Medication:    Current Facility-Administered Medications:   •  acetaminophen (TYLENOL) tablet 650 mg, 650 mg, Oral, Q4H PRN, 650 mg at 07/29/22 1426 **OR** acetaminophen (TYLENOL) 160 MG/5ML solution 650 mg, 650 mg, Oral, Q4H PRN **OR** acetaminophen (TYLENOL) suppository 650 mg, 650 mg, Rectal, Q4H PRN, Nishant Allan, PA  •  amLODIPine (NORVASC) tablet 10 mg, 10 mg, Oral, Daily, Nishant Allan PA, 10 mg at 08/05/22 0814  •  aspirin chewable tablet 81 mg, 81 mg, Oral, Daily, Nishant Allan PA, 81 mg at 08/05/22 0814  •  bisacodyl (DULCOLAX) suppository 10 mg, 10 mg, Rectal, Daily PRN, Nishant Allan PA  •  cefepime (MAXIPIME) 1 g/100 mL 0.9% NS IVPB (mbp), 1 g, Intravenous, Q12H, Sage Braga MD, 1 g at 08/05/22 0514  •  cetirizine (zyrTEC) tablet 5 mg, 5 mg, Oral, Daily, Nishant Allan PA, 5 mg at 08/05/22 0814  •  DAPTOmycin (CUBICIN) 600 mg in sodium chloride 0.9 % 50 mL IVPB, 6 mg/kg, Intravenous, Q24H, Sage Braga MD, Last Rate: 100 mL/hr at 08/05/22 1445, 600 mg at 08/05/22 1445  •  dextrose (D50W) (25 g/50 mL) IV injection 25 g, 25 g, Intravenous, Q15 Min PRN, Nishant Allan PA  •  dextrose (GLUTOSE) oral gel 15 g, 15 g, Oral, Q15 Min PRN, Nishant Allan PA  •  glucagon (human recombinant) (GLUCAGEN DIAGNOSTIC) injection 1 mg, 1 mg, Intramuscular, Q15 Min PRN, Nishant Allan PA  •  heparin (porcine) 5000 UNIT/ML injection 5,000 Units, 5,000 Units, Subcutaneous, Q8H, Nishant Allan PA, 5,000 Units at 08/05/22 1445  •  HYDROcodone-acetaminophen (NORCO) 5-325 MG per tablet 1 tablet, 1 tablet, Oral, Q4H PRN, Peter Braga, , 1 tablet at 08/05/22 0515  •  insulin detemir (LEVEMIR) injection 30 Units, 30 Units, Subcutaneous, Nightly, Melissa Nguyen PA-C, 30 Units at 08/04/22 2111  •  Insulin Lispro (humaLOG)  injection 0-7 Units, 0-7 Units, Subcutaneous, TID AC, Melissa Nguyen PA-C, 3 Units at 08/05/22 0815  •  Insulin Lispro (humaLOG) injection 5 Units, 5 Units, Subcutaneous, TID With Meals, Melissa Nguyen PA-C, 5 Units at 08/05/22 0814  •  levothyroxine (SYNTHROID, LEVOTHROID) tablet 50 mcg, 50 mcg, Oral, Q AM, Nishant Allan PA, 50 mcg at 08/05/22 0515  •  metoprolol succinate XL (TOPROL-XL) 24 hr tablet 50 mg, 50 mg, Oral, Q24H, Nishant Allan PA, 50 mg at 08/05/22 0814  •  ondansetron (ZOFRAN) tablet 4 mg, 4 mg, Oral, Q6H PRN **OR** ondansetron (ZOFRAN) injection 4 mg, 4 mg, Intravenous, Q6H PRN, Nishant Allan PA  •  phenol (CHLORASEPTIC) 1.4 % liquid 1 spray, 1 spray, Mouth/Throat, Q2H PRN, Megan Shukla PA, 1 spray at 08/04/22 1610  •  sennosides-docusate (PERICOLACE) 8.6-50 MG per tablet 2 tablet, 2 tablet, Oral, BID PRN, Nishant Allan PA  •  [COMPLETED] Insert peripheral IV, , , Once **AND** sodium chloride 0.9 % flush 10 mL, 10 mL, Intravenous, PRN, Nishant Allan PA  •  sodium chloride 0.9 % flush 10 mL, 10 mL, Intravenous, Q12H, Nishant Allan PA, 10 mL at 08/04/22 0800  •  sodium chloride 0.9 % flush 10 mL, 10 mL, Intravenous, PRN, Nishant Allan PA  •  sodium chloride 0.9 % infusion, 100 mL/hr, Intravenous, Continuous, Melissa Nguyen PA-C, Last Rate: 100 mL/hr at 08/05/22 0246, 100 mL/hr at 08/05/22 0246  •  vitamin D3 capsule 5,000 Units, 5,000 Units, Oral, Daily, Locklar, Nishant C, PA, 5,000 Units at 08/05/22 0814    Facility-Administered Medications Ordered in Other Encounters:   •  Chlorhexidine Gluconate Cloth 2 % pads 1 application, 1 application, Topical, Q12H PRN, Mohan Arauz PA    Antibiotics:  Anti-Infectives (From admission, onward)    Ordered     Dose/Rate Route Frequency Start Stop    08/04/22 1550  cefepime (MAXIPIME) 1 g/100 mL 0.9% NS IVPB (mbp)        Ordering Provider: Sage Braga MD    1 g  over 4 Hours Intravenous Every 12 Hours  22 0600 22 0559    22 1550  DAPTOmycin (CUBICIN) 600 mg in sodium chloride 0.9 % 50 mL IVPB        Ordering Provider: Sage Braga MD    6 mg/kg × 98 kg  100 mL/hr over 30 Minutes Intravenous Every 24 Hours Scheduled 22 1730 22 1559    22 1550  cefepime (MAXIPIME) 2 g/100 mL 0.9% NS (mbp)        Ordering Provider: Sage Braga MD    2 g  200 mL/hr over 30 Minutes Intravenous Once 22 1645 22 1640    22 1111  vancomycin 1500 mg/500 mL 0.9% NS IVPB ([facility])        Ordering Provider: Nishant Allan PA    15 mg/kg × 98 kg Intravenous Once 22 2200 22 2130    22 1209  vancomycin in dextrose 5% 150 mL (VANCOCIN) IVPB 750 mg        Ordering Provider: Miguel Bray RPH    750 mg Intravenous Every 12 Hours 22 2200 22 0934    22 1209  vancomycin in dextrose 5% 150 mL (VANCOCIN) IVPB 750 mg        Ordering Provider: Miguel Bray RPH    750 mg  over 60 Minutes Intravenous Every 12 Hours 22 1300 22 1422    22 0014  piperacillin-tazobactam (ZOSYN) 3.375 g in iso-osmotic dextrose 50 ml (premix)        Ordering Provider: Eliana Hightower APRN    3.375 g  over 4 Hours Intravenous Every 8 Hours 22 0800 22 0303    22 2208  piperacillin-tazobactam (ZOSYN) 3.375 g in iso-osmotic dextrose 50 ml (premix)        Ordering Provider: Donny Hightower APRN    3.375 g  over 30 Minutes Intravenous Once 22 2210 22 0320    22 2208  vancomycin 2000 mg/500 mL 0.9% NS IVPB ([facility])        Ordering Provider: Donny Hightower APRN    20 mg/kg × 98 kg Intravenous Once 22 2210 22 0040            Review of Systems:  See HPI      Physical Exam:   Vital Signs  Temp (24hrs), Av.8 °F (36.6 °C), Min:97.6 °F (36.4 °C), Max:98.1 °F (36.7 °C)    Temp  Min: 97.6 °F (36.4 °C)  Max: 98.1 °F (36.7 °C)  BP  Min: 128/78  Max: 164/78  Pulse  Min: 61  Max: 80  Resp  Min: 16  Max: 18  SpO2  Min:  95 %  Max: 97 %    GENERAL: resting quietly  in no acute distress.   HEENT: Normocephalic, atraumatic.  PERRL. EOMI. No conjunctival injection. No icterus. Oropharynx clear without evidence of thrush or exudate. No evidence of peridontal disease.    HEART: RRR; No murmur,  LUNGS: Clear to auscultation bilaterally   ABDOMEN: Soft, nontender,   :  Without Fonseca catheter.  MSK: Left foot wrapped  SKIN: Warm and dry without cutaneous eruptions on Inspection/palpation.        Laboratory Data    Results from last 7 days   Lab Units 08/05/22  0618 08/04/22  0539 08/03/22  0549   WBC 10*3/mm3 9.74 11.13* 12.97*   HEMOGLOBIN g/dL 12.1* 12.1* 11.7*   HEMATOCRIT % 35.3* 36.4* 35.0*   PLATELETS 10*3/mm3 229 237 271     Results from last 7 days   Lab Units 08/05/22  0618   SODIUM mmol/L 140   POTASSIUM mmol/L 4.4   CHLORIDE mmol/L 107   CO2 mmol/L 21.0*   BUN mg/dL 38*   CREATININE mg/dL 2.35*   GLUCOSE mg/dL 214*   CALCIUM mg/dL 8.1*                         Results from last 7 days   Lab Units 08/04/22  0539 08/03/22  0549 08/01/22  0918   VANCOMYCIN RM mcg/mL 19.50 25.50 12.80     Estimated Creatinine Clearance: 36.5 mL/min (A) (by C-G formula based on SCr of 2.35 mg/dL (H)).      Microbiology:  No results found for: ACANTHNAEG, AFBCX, BPERTUSSISCX, BLOODCX  No results found for: BCIDPCR, CXREFLEX, CSFCX, CULTURETIS  No results found for: CULTURES, HSVCX, URCX  No results found for: EYECULTURE, GCCX, HSVCULTURE, LABHSV  No results found for: LEGIONELLA, MRSACX, MUMPSCX, MYCOPLASCX  No results found for: NOCARDIACX, STOOLCX  No results found for: THROATCX, UNSTIMCULT, URINECX, CULTURE, VZVCULTUR  No results found for: VIRALCULTU, WOUNDCX        Radiology:  Imaging Results (Last 72 Hours)     ** No results found for the last 72 hours. **        Estimated Creatinine Clearance: 36.5 mL/min (A) (by C-G formula based on SCr of 2.35 mg/dL (H)).      Impression:   Leukocytosis with neutrophilia, improved   Lactic acidosis  Left  great toe wound- osteomyelitis by bone scan- amputation recommended by Dr. Márquez   Status post fall  Diabetes mellitus type 2  Left shoulder dislocation  Probable peripheral vascular disease  Acute renal failure    PLAN/RECOMMENDATIONS:     D/c vanco    D/c zosyn    cont dapto 6 mg/kg iv q24h   cont cefepime 1 g iv q12h    Ivf    Follow crcl    Follow cultures with prelim enterococcus  D/w hospitalist   [facility] group to follow  Sage Braga MD  8/5/2022  14:54 EDT

## 2022-08-05 NOTE — PROGRESS NOTES
[facility] Medicine Services  PROGRESS NOTE    Patient Name: Jermaine Singh  : 1945  MRN: [id number]    Date of Admission: 2022  Primary Care Physician: Farooq Painter MD    Subjective   Subjective     CC:  F/u osteomyelitis    HPI:  Patient resting in bedside chair. No issues overnight. No pain.    ROS:  Gen- No fevers, chills  CV- No chest pain, palpitations  Resp- No cough, dyspnea  GI- No N/V/D, abd pain    Objective   Objective     Vital Signs:   Temp:  [97.6 °F (36.4 °C)-98.1 °F (36.7 °C)] 97.6 °F (36.4 °C)  Heart Rate:  [61-80] 62  Resp:  [16-18] 18  BP: (128-164)/(61-83) 130/61     Physical Exam:  Constitutional: No acute distress, awake, alert  HENT: NCAT, mucous membranes moist  Respiratory: Clear to auscultation bilaterally, respiratory effort normal   Cardiovascular: RRR, no murmurs, rubs, or gallops  Gastrointestinal: Positive bowel sounds, soft, nontender, nondistended  Musculoskeletal: No bilateral ankle edema, LLE in post-op bandage  Psychiatric: Appropriate affect, cooperative  Neurologic: Oriented x 3, strength symmetric in all extremities, Cranial Nerves grossly intact to confrontation, speech clear  Skin: No rashes      Results Reviewed:  LAB RESULTS:      Lab 22  0618 22  0539 22  0549 22  0918   WBC 9.74 11.13* 12.97* 7.81   HEMOGLOBIN 12.1* 12.1* 11.7* 13.4   HEMATOCRIT 35.3* 36.4* 35.0* 40.0   PLATELETS 229 237 271 264   NEUTROS ABS  --   --  10.89*  --    IMMATURE GRANS (ABS)  --   --  0.06*  --    LYMPHS ABS  --   --  0.99  --    MONOS ABS  --   --  0.94*  --    EOS ABS  --   --  0.01  --    MCV 86.9 88.6 86.6 87.1         Lab 22  0618 22  0539 22  0549 22  1416 08/01/22  21  0820   SODIUM 140 140 136 137  --  137   POTASSIUM 4.4 4.2 4.1 4.2 4.4 4.0   CHLORIDE 107 105 100 100  --  103   CO2 21.0* 25.0 23.0 22.0  --  26.0   ANION GAP 12.0 10.0 13.0 15.0  --  8.0   BUN 38* 32* 28* 22  --  15    CREATININE 2.35* 2.08* 1.89* 1.33*  --  1.18   EGFR 27.8* 32.2* 36.1* 55.1*  --  63.6   GLUCOSE 214* 165* 300* 288*  --  233*   CALCIUM 8.1* 8.3* 8.3* 8.7  --  8.7                 Lab 08/04/22  0539   CHOLESTEROL 180   LDL CHOL 113*   HDL CHOL 39*   TRIGLYCERIDES 160*             Brief Urine Lab Results  (Last result in the past 365 days)      Color   Clarity   Blood   Leuk Est   Nitrite   Protein   CREAT   Urine HCG        02/27/22 1321 Dark Yellow   Clear   Negative   Negative   Negative   2+                 Microbiology Results Abnormal     Procedure Component Value - Date/Time    Anaerobic Culture - Tissue, Toe, Left [[id number]] Collected: 08/02/22 1027    Lab Status: Preliminary result Specimen: Tissue from Toe, Left Updated: 08/05/22 0742     Anaerobic Culture No anaerobes isolated at 3 days    Blood Culture - Blood, Hand, Left [[id number]]  (Normal) Collected: 07/27/22 2040    Lab Status: Final result Specimen: Blood from Hand, Left Updated: 08/01/22 2132     Blood Culture No growth at 5 days    Blood Culture - Blood, Hand, Right [[id number]]  (Normal) Collected: 07/27/22 2030    Lab Status: Final result Specimen: Blood from Hand, Right Updated: 08/01/22 2132     Blood Culture No growth at 5 days    COVID PRE-OP / PRE-PROCEDURE SCREENING ORDER (NO ISOLATION) - Swab, Nasopharynx [[id number]]  (Normal) Collected: 07/27/22 2246    Lab Status: Final result Specimen: Swab from Nasopharynx Updated: 07/27/22 2277    Narrative:      The following orders were created for panel order COVID PRE-OP / PRE-PROCEDURE SCREENING ORDER (NO ISOLATION) - Swab, Nasopharynx.  Procedure                               Abnormality         Status                     ---------                               -----------         ------                     COVID-19 and FLU A/B PCR...[[id number]]  Normal              Final result                 Please view results for these tests on the individual orders.    COVID-19 and FLU A/B PCR - Swab,  Nasopharynx [[id number]]  (Normal) Collected: 07/27/22 2246    Lab Status: Final result Specimen: Swab from Nasopharynx Updated: 07/27/22 0084     COVID19 Not Detected     Influenza A PCR Not Detected     Influenza B PCR Not Detected    Narrative:      Fact sheet for providers: https://www.fda.gov/media/036139/download    Fact sheet for patients: https://www.fda.gov/media/497814/download    Test performed by PCR.          No radiology results from the last 24 hrs        I have reviewed the medications:  Scheduled Meds:amLODIPine, 10 mg, Oral, Daily  aspirin, 81 mg, Oral, Daily  cefepime, 1 g, Intravenous, Q12H  cetirizine, 5 mg, Oral, Daily  DAPTOmycin, 6 mg/kg, Intravenous, Q24H  heparin (porcine), 5,000 Units, Subcutaneous, Q8H  insulin detemir, 30 Units, Subcutaneous, Nightly  insulin lispro, 0-7 Units, Subcutaneous, TID AC  Insulin Lispro, 5 Units, Subcutaneous, TID With Meals  levothyroxine, 50 mcg, Oral, Q AM  metoprolol succinate XL, 50 mg, Oral, Q24H  sodium chloride, 10 mL, Intravenous, Q12H  vitamin D3, 5,000 Units, Oral, Daily      Continuous Infusions:sodium chloride, 100 mL/hr, Last Rate: 100 mL/hr (08/05/22 0246)      PRN Meds:.•  acetaminophen **OR** acetaminophen **OR** acetaminophen  •  bisacodyl  •  dextrose  •  dextrose  •  glucagon (human recombinant)  •  HYDROcodone-acetaminophen  •  ondansetron **OR** ondansetron  •  phenol  •  sennosides-docusate  •  [COMPLETED] Insert peripheral IV **AND** sodium chloride  •  sodium chloride    Assessment & Plan   Assessment & Plan     Active Hospital Problems    Diagnosis  POA   • Proximal phalanx fracture of the second digit extending into the second metatarsal joint [S92.919A]  Unknown   • Dislocation of left shoulder joint, initial encounter [S43.005A]  Yes   • Cellulitis in diabetic foot (HCC) [E11.628, L03.119]  Yes   • Fall [W19.XXXA]  Yes   • Sepsis (HCC) [A41.9]  Yes   • Leukocytosis [D72.829]  Yes   • Lactic acidosis [E87.2]  Yes   • Elevated  C-reactive protein (CRP) [R79.82]  Yes   • Elevated sed rate (elev SR) [R70.0]  Yes   • SSS (sick sinus syndrome) (HCC) [I49.5]  Yes   • Hyperlipidemia [E78.5]  Yes   • Hypertension [I10]  Yes   • S/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 Au [Z95.1]  Not Applicable   • Hypothyroidism (acquired) [E03.9]  Yes   • Type 2 diabetes mellitus (HCC) [E11.9]  Yes      Resolved Hospital Problems   No resolved problems to display.        Brief Hospital Course to date:  Jermaine Singh is a 77 y.o. male with PMH significant for HTN, HLD, CAD s/p CABG, PVD s/p RLE stent, CKD III, insulin-dependent DMII and hypothyroidism. He was admitted to [facility] 7/27/22 for L shoulder dislocation after a fall as well as sepsis secondary to L great toe osteomyelitis.     L great toe cellulitis / osteomyelitis  Peripheral vascular disease   - Followed by Dr. Joseph of podiatry - was on PO Doxycycline outpatient  - Bone scan concerning for osteomyelitis of L great toe   - s/p L great toe amputation by Dr. Márquez 8/2/22   - Appreciate cardiology evaluation - arterial duplex obtained. Cardiology does not recommend revascularization at this time   - ID following - per 8/03 note, to continue IV Zosyn/Vancomycin. Discussed with pharmacy, holding Vanc due to [place]      Fall   Anterior L shoulder dislocation  Acute fracture of the  proximal phalanx of the second digit extending into the second metatarsophalangeal joint  - s/p reduction in the ED  - Appreciate ortho (Dr. Gipson) assistance   - Recommend non-operative treatment. Sling for comfort.   - PT for range of motion exercises, avoid Abduction and external rotation. WBAT on LUE with walker or knee scooter  - Follow up with Dr. Gipson in 2-3 weeks      Insulin-dependent DMII  - Hgb A1c 9.0%  - Continue Levemir 30 units QHS and decrease Lispro to 5 units TID AC + SSI      [place] - worsening  - Baseline creatinine appears to be 0.9-1.2  - Creatinine 2.3 today, s/p IVF  - Cardiology has stopped  ACE, monitor, repeat AM labs     Hypertension  Coronary artery disease s/p CABG  SSS s/p PPM   - Cardiology has stopped ACEI and added Amlodipine 10mg daily   - Continue Metoprolol XL 50mg daily      Hypothyroid  - Continue levothyroxine    Expected Discharge Location and Transportation: rehab  Expected Discharge Date: 8/6    DVT prophylaxis:  Medical and mechanical DVT prophylaxis orders are present.     AM-PAC 6 Clicks Score (PT): 11 (08/04/22 8013)    CODE STATUS:   Code Status and Medical Interventions:   Ordered at: 07/28/22 0000     Code Status (Patient has no pulse and is not breathing):    CPR (Attempt to Resuscitate)     Medical Interventions (Patient has pulse or is breathing):    Full Support       Rajani Farr,   08/05/22

## 2022-08-05 NOTE — THERAPY TREATMENT NOTE
Patient Name: Jermaine Singh  : 1945    MRN: [id number]                              Today's Date: 2022       Admit Date: 2022    Visit Dx:     ICD-10-CM ICD-9-CM   1. Dislocation of left shoulder joint, initial encounter  S43.005A 831.00   2. Cellulitis of left foot  L03.116 682.7   3. 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  Preserved LV function   • Type 2 diabetes mellitus ([facility])   • Hypertension   • Hyperlipidemia   • Hypothyroidism (acquired)   • Vitamin D deficiency on Rx    • Serum Cr 1.32 on admission.  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 Au   • Dizziness   • Atherosclerosis of native artery of both lower extremities with intermittent claudication ([facility])   • SSS (sick sinus syndrome) (Formerly [facility])   • Dislocation of left shoulder joint, initial encounter   • Cellulitis in diabetic foot ([facility])   • Fall   • Sepsis ([facility])   • Leukocytosis   • Lactic acidosis   • Elevated C-reactive protein (CRP)   • Elevated sed rate (elev SR)   • Proximal phalanx fracture of the second digit extending into the second metatarsal joint     Past Medical History:   Diagnosis Date   • Asthma    • Coronary artery disease    • Diabetes mellitus ([facility])     started on [place] 2018; started on po meds in ; checking blood sugars daily    • Disease of thyroid gland     po meds daily for hypothyroidism    • History of fracture as a child     rt leg- severe    • Hyperlipidemia    • Hypertension    • Hypothyroidism    • Peripheral neuropathy    • Peripheral vascular disease ([facility])     s/p angiogram -needs stent in left leg    • RBBB    • Right knee pain    • Vitamin D deficiency      Past Surgical History:   Procedure Laterality Date   • APPENDECTOMY     • CARDIAC CATHETERIZATION N/A 2019    Procedure: Left Heart Cath;  Surgeon: Cooper Apodaca  "MD ALEXY;  Location:  [facility];  Service: Cardiology   • CARDIAC ELECTROPHYSIOLOGY PROCEDURE N/A 5/18/2022    Procedure: DEVICE IMPLANT;  Surgeon: Cooper Apodaca MD;  Location:  [facility];  Service: Cardiology;  Laterality: N/A;   • COLONOSCOPY     • CORONARY ARTERY BYPASS GRAFT N/A 3/22/2019    Procedure: MEDIAN STERNOTOMY, CORONARY ARTERY BYPASS GRAFT X3, UTILIZING THE LEFT INTERNAL MAMMARY ARTERY, EVH AND OPEN HARVEST OF THE RIGHT GREATER SAPHENOUS VEIN, EXPLORATION OF THE LEFT LEG;  Surgeon: Ap Au MD;  Location:  [facility] OR;  Service: Cardiothoracic   • EYE SURGERY Bilateral     cataracts    • INTERVENTIONAL RADIOLOGY PROCEDURE N/A 7/29/2021    Procedure: Abdominal Aortagram with Runoff;  Surgeon: Jermaine Hernandez MD;  Location:  [facility]ATH INVASIVE LOCATION;  Service: Cardiovascular;  Laterality: N/A;   • KNEE ARTHROSCOPY      right x 2, left x 1   • LACERATION REPAIR      right leg   • LEG SURGERY      2 for fracture of rt leg    • TONSILLECTOMY      Adnoidectomy   • TRANS METATARSAL AMPUTATION Left 8/2/2022    Procedure: GREAT TOE AMPUTATION LEFT;  Surgeon: Gopal Márquez MD;  Location:  [facility];  Service: Vascular;  Laterality: Left;      General Information     Row Name 08/05/22 1537          OT Time and Intention    Document Type therapy note (daily note)  -MR     Mode of Treatment occupational therapy  -MR     Row Name 08/05/22 1538          General Information    Patient Profile Reviewed yes  -MR     Existing Precautions/Restrictions fall;pacemaker;left;non-weight bearing  s/p mechanical fall with L shoulder dislocation reduced in ED, sling for comfort, per ortho consult pt cleared for LUE ROM \"avoiding ABDuction and ER.\" S/P L great toe amputation 8/2 toe-offloading shoe w/ mobility. Pt cleared for WBAT on the LUE  -MR     Barriers to Rehab medically complex;previous functional deficit;impaired sensation  -MR     Row Name 08/05/22 1537          " Cognition    Orientation Status (Cognition) oriented x 4  -MR     Row Name 08/05/22 1537          Safety Issues, Functional Mobility    Safety Issues Affecting Function (Mobility) awareness of need for assistance;insight into deficits/self-awareness;safety precaution awareness;safety precautions follow-through/compliance;sequencing abilities;judgment  -MR     Impairments Affecting Function (Mobility) balance;endurance/activity tolerance;pain;strength;sensation/sensory awareness;postural/trunk control  -MR     Cognitive Impairments, Mobility Safety/Performance attention;awareness, need for assistance;insight into deficits/self-awareness;judgment;problem-solving/reasoning;safety precaution awareness;safety precaution follow-through;sequencing abilities  -MR           User Key  (r) = Recorded By, (t) = Taken By, (c) = Cosigned By    Initials Name Provider Type     Rosalinda Silva, ANG Occupational Therapist                 Mobility/ADL's     Row Name 08/05/22 1539          Bed Mobility    Comment, (Bed Mobility) received UIC and left UIC  -MR     Row Name 08/05/22 1539          Transfers    Transfers sit-stand transfer  -MR     Comment, (Transfers) OT facilitated pt in STS transfer training from recliner to upright at RW to maximize I and safety w/ toilet transfers and functional mobility. Pt unable to sequence transfer while adhering to NWB status on the LLE despite tactile input w/ therapist foot under pt's foot and v/c. x2 attempts from recliner to RW  -MR     Sit-Stand [place] (Transfers) maximum assist (25% patient effort);verbal cues;nonverbal cues (demo/gesture)  -MR     Row Name 08/05/22 1539          Sit-Stand Transfer    Assistive Device (Sit-Stand Transfers) walker, front-wheeled  -MR     Comment, (Sit-Stand Transfer) Darco shoe donned  -MR     Row Name 08/05/22 1539          Mobility    Extremity Weight-bearing Status left upper extremity;left lower extremity  -MR     Left Upper Extremity  (Weight-bearing Status) weight-bearing as tolerated (WBAT)  -MR     Left Lower Extremity (Weight-bearing Status) non weight-bearing (NWB)  -MR           User Key  (r) = Recorded By, (t) = Taken By, (c) = Cosigned By    Initials Name Provider Type     Rosalinda Silva OT Occupational Therapist               Obj/Interventions     Row Name 08/05/22 1550          Balance    Balance Assessment sitting static balance;sitting dynamic balance;standing static balance  -MR     Static Sitting Balance standby assist  -MR     Dynamic Sitting Balance standby assist  -MR     Position, Sitting Balance sitting in chair  -MR     Static Standing Balance maximum assist;verbal cues;non-verbal cues (demo/gesture)  -MR     Position/Device Used, Standing Balance supported;walker, rolling  -MR     Balance Interventions sitting;standing;sit to stand;supported;static;dynamic;minimal challenge  -MR           User Key  (r) = Recorded By, (t) = Taken By, (c) = Cosigned By    Initials Name Provider Type     Ricardo Rosalinda, ANG Occupational Therapist               Goals/Plan    No documentation.                Clinical Impression     Row Name 08/05/22 5001          Pain Assessment    Pretreatment Pain Rating 0/10 - no pain  -MR     Posttreatment Pain Rating 0/10 - no pain  -MR     Pre/Posttreatment Pain Comment Denied pain pre and post session  -MR     Row Name 08/05/22 6031          Plan of Care Review    Plan of Care Reviewed With patient  -MR     Progress no change  -MR     Outcome Evaluation OT facilitated pt in STS transfer training this date. DEP assist for donning L offloading shoe. Pt having significant difficulty w/ completing transfer and adhering to NWB status on the LLE despite physical assist (therapist foot under pt's) and max v/c. Attempted twice and pt unable to sequence or achieve full upright standing. Further transfer training discontinued. Will progress as able per current POC.  -MR     Row Name 08/05/22 9676          Therapy  Plan Review/Discharge Plan (OT)    Anticipated Discharge Disposition (OT) inpatient rehabilitation facility  -MR     Row Name 08/05/22 1551          Vital Signs    Pre Systolic BP Rehab --  VSS  -MR     O2 Delivery Pre Treatment room air  -MR     O2 Delivery Intra Treatment room air  -MR     O2 Delivery Post Treatment room air  -MR     Pre Patient Position Sitting  -MR     Intra Patient Position Standing  -MR     Post Patient Position Sitting  -MR     Row Name 08/05/22 1551          Positioning and Restraints    Pre-Treatment Position sitting in chair/recliner  -MR     Post Treatment Position chair  -MR     In Chair reclined;sitting;call light within reach;encouraged to call for assist;exit alarm on;with nsg;waffle cushion;on mechanical lift sling;legs elevated;LLE elevated  -MR           User Key  (r) = Recorded By, (t) = Taken By, (c) = Cosigned By    Initials Name Provider Type    Rosalinda Domingo OT Occupational Therapist               Outcome Measures     Row Name 08/05/22 1557          How much help from another is currently needed...    Putting on and taking off regular lower body clothing? 1  -MR     Bathing (including washing, rinsing, and drying) 3  -MR     Toileting (which includes using toilet bed pan or urinal) 2  -MR     Putting on and taking off regular upper body clothing 3  -MR     Taking care of personal grooming (such as brushing teeth) 3  -MR     Eating meals 4  -MR     AM-PAC 6 Clicks Score (OT) 16  -MR     Row Name 08/05/22 1557          Functional Assessment    Outcome Measure Options AM-PAC 6 Clicks Daily Activity (OT)  -MR           User Key  (r) = Recorded By, (t) = Taken By, (c) = Cosigned By    Initials Name Provider Type    Rosalinda Domingo OT Occupational Therapist                Occupational Therapy Education                 Title: PT OT SLP Therapies (Done)     Topic: Occupational Therapy (Done)     Point: ADL training (Done)     Description:   Instruct learner(s) on proper safety  adaptation and remediation techniques during self care or transfers.   Instruct in proper use of assistive devices.              Learning Progress Summary           Patient Acceptance, E, VU by MR at 8/5/2022 1557      Show all documentation for this point (5)                 Point: Home exercise program (Done)     Description:   Instruct learner(s) on appropriate technique for monitoring, assisting and/or progressing therapeutic exercises/activities.              Learning Progress Summary           Patient Acceptance, E, VU by MR at 8/5/2022 1557      Show all documentation for this point (5)                 Point: Precautions (Done)     Description:   Instruct learner(s) on prescribed precautions during self-care and functional transfers.              Learning Progress Summary           Patient Acceptance, E, VU by MR at 8/5/2022 1557      Show all documentation for this point (5)                 Point: Body mechanics (Done)     Description:   Instruct learner(s) on proper positioning and spine alignment during self-care, functional mobility activities and/or exercises.              Learning Progress Summary           Patient Acceptance, E, VU by MR at 8/5/2022 1557      Show all documentation for this point (2)                             User Key     Initials Effective Dates Name Provider Type Discipline    MR 10/06/21 -  Rosalinda Silva, OT Occupational Therapist OT              OT Recommendation and Plan  Planned Therapy Interventions (OT): activity tolerance training, adaptive equipment training, BADL retraining, functional balance retraining, IADL retraining, occupation/activity based interventions, patient/caregiver education/training, transfer/mobility retraining, strengthening exercise, ROM/therapeutic exercise  Therapy Frequency (OT): daily  Plan of Care Review  Plan of Care Reviewed With: patient  Progress: no change  Outcome Evaluation: OT facilitated pt in STS transfer training this date. DEP assist  for donning L offloading shoe. Pt having significant difficulty w/ completing transfer and adhering to NWB status on the LLE despite physical assist (therapist foot under pt's) and max v/c. Attempted twice and pt unable to sequence or achieve full upright standing. Further transfer training discontinued. Will progress as able per current POC.     Time Calculation:    Time Calculation- OT     Row Name 08/05/22 1557 08/05/22 0801          Time Calculation- OT    OT Start Time 1430  -MR 1412  -MR     OT Received On 08/05/22  -MR 08/04/22  -MR     OT Goal Re-Cert Due Date 08/14/22  -MR 08/14/22  -MR            Timed Charges    33307 - OT Therapeutic Activity Minutes 13  -MR --     06773 - OT Self Care/Mgmt Minutes 5  -MR --            Total Minutes    Timed Charges Total Minutes 18  -MR --      Total Minutes 18  -MR --           User Key  (r) = Recorded By, (t) = Taken By, (c) = Cosigned By    Initials Name Provider Type     Rajiv Silva OT Occupational Therapist              Therapy Charges for Today     Code Description Service Date Service Provider Modifiers Qty    68810582537 HC OT RE-EVAL 2 8/4/2022 Rajiv Silva OT GO 1    06263968790 HC OT SELF CARE/MGMT/TRAIN EA 15 MIN 8/4/2022 Rajiv Silva OT GO 1    56704592795 HC OT THERAPEUTIC ACT EA 15 MIN 8/5/2022 Rajiv Silva OT GO 1               RAJIV SILVA OT  8/5/2022

## 2022-08-05 NOTE — PROGRESS NOTES
" [facility] - Progress Note    Jermaine JIMENES Francisco  1945  S383/1    08/05/22,  08:40 EDT      Chief Complaint: Following for PAD, HTN    Subjective:   POD #3 s/p Left Grt toe transmetatarsal amputation   C/O sore throat.  Did not Sleep well, prefers to sleep in recliner.  Pain fairly controlled.    Shoulder movement improving.    Review of Systems:  Pertinent positives are listed above and in physical exam.  All others have been reviewed and are negative.    amLODIPine, 10 mg, Oral, Daily  aspirin, 81 mg, Oral, Daily  cefepime, 1 g, Intravenous, Q12H  cetirizine, 5 mg, Oral, Daily  DAPTOmycin, 6 mg/kg, Intravenous, Q24H  heparin (porcine), 5,000 Units, Subcutaneous, Q8H  insulin detemir, 30 Units, Subcutaneous, Nightly  insulin lispro, 0-7 Units, Subcutaneous, TID AC  Insulin Lispro, 5 Units, Subcutaneous, TID With Meals  levothyroxine, 50 mcg, Oral, Q AM  metoprolol succinate XL, 50 mg, Oral, Q24H  sodium chloride, 10 mL, Intravenous, Q12H  vitamin D3, 5,000 Units, Oral, Daily        Objective:  Vitals:   height is 190.5 cm (75\") and weight is 98 kg (216 lb). His oral temperature is 98.1 °F (36.7 °C). His blood pressure is 128/78 and his pulse is 66. His respiration is 18 and oxygen saturation is 96%.       Intake/Output Summary (Last 24 hours) at 8/5/2022 0811  Last data filed at 8/5/2022 0600  Gross per 24 hour   Intake 2831.67 ml   Output 995 ml   Net 1836.67 ml       Physical Exam:  General:  WN, NAD, A and O x3.  CV:  Normal S1,S2. No murmur, rub, or gallop.  Resp:  CTA Soy, equal, nonlabored.  Abd:  Soft, + BS, no organomegaly. Nontender to palpation.  Extrem:  No edema BLE, 2+ pedal/PT pulses with doppler.  Dressing noted Left foot - clean, intact.         Results from last 7 days   Lab Units 08/05/22  0618   WBC 10*3/mm3 9.74   HEMOGLOBIN g/dL 12.1*   HEMATOCRIT % 35.3*   PLATELETS 10*3/mm3 229     Results from last 7 days   Lab Units 08/05/22  0618   SODIUM mmol/L 140   POTASSIUM mmol/L " 4.4   CHLORIDE mmol/L 107   CO2 mmol/L 21.0*   BUN mg/dL 38*   CREATININE mg/dL 2.35*   CALCIUM mg/dL 8.1*   GLUCOSE mg/dL 214*             Results from last 7 days   Lab Units 08/04/22  0539   CHOLESTEROL mg/dL 180   TRIGLYCERIDES mg/dL 160*   HDL CHOL mg/dL 39*   LDL CHOL mg/dL 113*           Tele:  NSR    Assessment and Plan:  1.  Peripheral Vascular Disease              -  Remote Bilateral iliac stenting with significant infrapopliteal disease, good collateral flow.              - He has excellent pulses bilaterally by doppler              -  POD 3 p Left great toe transmetatarsal amputation.   -  Continue PT/OT     2.  CAD              -  Remote CABG              -  Currently asymptomatic.              -  Continue ASA     3.  Osteomyelitis              -  Left toe wound being followed by podiatrist as outpt with ortho shoe/oral antibiotics                     -  Elevated inflammatory markers/leukocytosis upon arrival with XRay suggesting osteo.              -  IV Antibiotics per ID.  Dr. Máruqez will continue to follow. Amputation recommended.       4.  DMII              -  Per primary service.       5.  Essential Hypertension              -  Better controlled              -  Continue home dose Toprol XL 50mg daily.  Trialed low dose Lisinopril with rise in Cr.   Discontinued ACEI.   Increased Norvasc to 10mg daily and continue to monitor.     -  Elevated BP yesterday/overnight requiring PRN hydralazine.        6.  Hyperlipidemia              -  Lipid panel reviewed.  , , HDL 39, .              -  Will address statin.     7.  Left shoulder dislocation              -  S/P fall prompting ED visit/admission.              -  PT/OT         8.  [place]   -  Cr 2.35 today   -  ACEI discontinued.   -  Continue with daily labs.       76 yo CM with known CAD/CABG, PVDz with prior stenting.  Presents after fall resulting in left shoulder dislocation as well as presenting with left toe wound.  Inflammatory  markers and WBC elevated, Xray suggests osteomyelitis.  He has known PVDz with severe infrapopliteal disease.  However, he has bilateral collateral flow and good pulses by doppler bilaterally.  At this time, there is no need for revascularization of the LLE and amputation recommended.  Post Op course is stable.  BP better controlled with ACEI but [place] noted.  DC'd ACEI and increased CCB with better control.       Cardiology will see again Monday.  Please contact covering service if needed over wknd.        I discussed the patient's findings and my recommendations with the patient, any present family members, and the nursing staff.  Cooper Apodaca MD saw and examined patient, verified hx and PE, read all radiographic studies, reviewed labs and micro data, and formulated dx, plan for treatment and all medical decision making.      Megan Shukla PA-C  08/05/22, 08:40 EDT

## 2022-08-06 LAB
ANION GAP SERPL CALCULATED.3IONS-SCNC: 8 MMOL/L (ref 5–15)
BACTERIA SPEC AEROBE CULT: ABNORMAL
BUN SERPL-MCNC: 35 MG/DL (ref 8–23)
BUN/CREAT SERPL: 17.4 (ref 7–25)
CALCIUM SPEC-SCNC: 8.3 MG/DL (ref 8.6–10.5)
CHLORIDE SERPL-SCNC: 110 MMOL/L (ref 98–107)
CO2 SERPL-SCNC: 24 MMOL/L (ref 22–29)
CREAT SERPL-MCNC: 2.01 MG/DL (ref 0.76–1.27)
DEPRECATED RDW RBC AUTO: 41.1 FL (ref 37–54)
EGFRCR SERPLBLD CKD-EPI 2021: 33.5 ML/MIN/1.73
ERYTHROCYTE [DISTWIDTH] IN BLOOD BY AUTOMATED COUNT: 13.1 % (ref 12.3–15.4)
GLUCOSE BLDC GLUCOMTR-MCNC: 112 MG/DL (ref 70–130)
GLUCOSE BLDC GLUCOMTR-MCNC: 135 MG/DL (ref 70–130)
GLUCOSE BLDC GLUCOMTR-MCNC: 181 MG/DL (ref 70–130)
GLUCOSE BLDC GLUCOMTR-MCNC: 209 MG/DL (ref 70–130)
GLUCOSE SERPL-MCNC: 128 MG/DL (ref 65–99)
GRAM STN SPEC: ABNORMAL
GRAM STN SPEC: ABNORMAL
HCT VFR BLD AUTO: 35.5 % (ref 37.5–51)
HGB BLD-MCNC: 12.1 G/DL (ref 13–17.7)
MCH RBC QN AUTO: 29.7 PG (ref 26.6–33)
MCHC RBC AUTO-ENTMCNC: 34.1 G/DL (ref 31.5–35.7)
MCV RBC AUTO: 87.2 FL (ref 79–97)
PLATELET # BLD AUTO: 240 10*3/MM3 (ref 140–450)
PMV BLD AUTO: 11.2 FL (ref 6–12)
POTASSIUM SERPL-SCNC: 4.3 MMOL/L (ref 3.5–5.2)
RBC # BLD AUTO: 4.07 10*6/MM3 (ref 4.14–5.8)
SODIUM SERPL-SCNC: 142 MMOL/L (ref 136–145)
WBC NRBC COR # BLD: 9.19 10*3/MM3 (ref 3.4–10.8)

## 2022-08-06 PROCEDURE — 63710000001 INSULIN LISPRO (HUMAN) PER 5 UNITS: Performed by: PHYSICIAN ASSISTANT

## 2022-08-06 PROCEDURE — 80048 BASIC METABOLIC PNL TOTAL CA: CPT | Performed by: INTERNAL MEDICINE

## 2022-08-06 PROCEDURE — 82962 GLUCOSE BLOOD TEST: CPT

## 2022-08-06 PROCEDURE — 25010000002 CEFEPIME PER 500 MG: Performed by: INTERNAL MEDICINE

## 2022-08-06 PROCEDURE — 25010000002 HEPARIN (PORCINE) PER 1000 UNITS: Performed by: PHYSICIAN ASSISTANT

## 2022-08-06 PROCEDURE — 99232 SBSQ HOSP IP/OBS MODERATE 35: CPT | Performed by: INTERNAL MEDICINE

## 2022-08-06 PROCEDURE — 85027 COMPLETE CBC AUTOMATED: CPT | Performed by: INTERNAL MEDICINE

## 2022-08-06 PROCEDURE — 99024 POSTOP FOLLOW-UP VISIT: CPT | Performed by: THORACIC SURGERY (CARDIOTHORACIC VASCULAR SURGERY)

## 2022-08-06 PROCEDURE — 63710000001 INSULIN DETEMIR PER 5 UNITS: Performed by: PHYSICIAN ASSISTANT

## 2022-08-06 PROCEDURE — 25010000002 DAPTOMYCIN PER 1 MG: Performed by: INTERNAL MEDICINE

## 2022-08-06 RX ADMIN — AMLODIPINE BESYLATE 10 MG: 10 TABLET ORAL at 08:10

## 2022-08-06 RX ADMIN — CETIRIZINE HYDROCHLORIDE 5 MG: 10 TABLET, FILM COATED ORAL at 08:10

## 2022-08-06 RX ADMIN — SENNOSIDES AND DOCUSATE SODIUM 2 TABLET: 50; 8.6 TABLET ORAL at 21:11

## 2022-08-06 RX ADMIN — HEPARIN SODIUM 5000 UNITS: 5000 INJECTION INTRAVENOUS; SUBCUTANEOUS at 05:51

## 2022-08-06 RX ADMIN — HYDROCODONE BITARTRATE AND ACETAMINOPHEN 1 TABLET: 5; 325 TABLET ORAL at 08:42

## 2022-08-06 RX ADMIN — ASPIRIN 81 MG CHEWABLE TABLET 81 MG: 81 TABLET CHEWABLE at 08:10

## 2022-08-06 RX ADMIN — INSULIN LISPRO 2 UNITS: 100 INJECTION, SOLUTION INTRAVENOUS; SUBCUTANEOUS at 08:10

## 2022-08-06 RX ADMIN — CEFEPIME HYDROCHLORIDE 1 G: 1 INJECTION, POWDER, FOR SOLUTION INTRAMUSCULAR; INTRAVENOUS at 17:57

## 2022-08-06 RX ADMIN — INSULIN LISPRO 5 UNITS: 100 INJECTION, SOLUTION INTRAVENOUS; SUBCUTANEOUS at 08:10

## 2022-08-06 RX ADMIN — HEPARIN SODIUM 5000 UNITS: 5000 INJECTION INTRAVENOUS; SUBCUTANEOUS at 21:10

## 2022-08-06 RX ADMIN — HEPARIN SODIUM 5000 UNITS: 5000 INJECTION INTRAVENOUS; SUBCUTANEOUS at 14:58

## 2022-08-06 RX ADMIN — LEVOTHYROXINE SODIUM 50 MCG: 50 TABLET ORAL at 05:51

## 2022-08-06 RX ADMIN — INSULIN DETEMIR 30 UNITS: 100 INJECTION, SOLUTION SUBCUTANEOUS at 21:12

## 2022-08-06 RX ADMIN — DAPTOMYCIN 600 MG: 500 INJECTION, POWDER, LYOPHILIZED, FOR SOLUTION INTRAVENOUS at 14:58

## 2022-08-06 RX ADMIN — INSULIN LISPRO 5 UNITS: 100 INJECTION, SOLUTION INTRAVENOUS; SUBCUTANEOUS at 17:57

## 2022-08-06 RX ADMIN — ACETAMINOPHEN 650 MG: 325 TABLET, FILM COATED ORAL at 21:10

## 2022-08-06 RX ADMIN — Medication 5000 UNITS: at 08:10

## 2022-08-06 RX ADMIN — CEFEPIME HYDROCHLORIDE 1 G: 1 INJECTION, POWDER, FOR SOLUTION INTRAMUSCULAR; INTRAVENOUS at 05:50

## 2022-08-06 RX ADMIN — METOPROLOL SUCCINATE 50 MG: 50 TABLET, EXTENDED RELEASE ORAL at 08:10

## 2022-08-06 NOTE — PLAN OF CARE
Goal Outcome Evaluation:      Pt opting to sleep in chair. Pain well controlled with PRNs. Dependent pulses requiring Doppler. Pt unhappy about wearing PT shoe.

## 2022-08-06 NOTE — PROGRESS NOTES
[facility] Medicine Services  PROGRESS NOTE    Patient Name: Jermaine Singh  : 1945  MRN: [id number]    Date of Admission: 2022  Primary Care Physician: Farooq Painter MD    Subjective   Subjective     CC:  F/u osteomyelitis    HPI:  Patient resting in bedside chair. Had a pain pill this morning, no other complaints.    ROS:  Gen- No fevers, chills  CV- No chest pain, palpitations  Resp- No cough, dyspnea  GI- No N/V/D, abd pain    Objective   Objective     Vital Signs:   Temp:  [97.6 °F (36.4 °C)-98.2 °F (36.8 °C)] 98.2 °F (36.8 °C)  Heart Rate:  [62-72] 72  Resp:  [16-18] 16  BP: (130-152)/(61-81) 152/78     Physical Exam:  Constitutional: No acute distress, awake, alert  HENT: NCAT, mucous membranes moist  Respiratory: Clear to auscultation bilaterally, respiratory effort normal   Cardiovascular: RRR, no murmurs, rubs, or gallops  Gastrointestinal: Positive bowel sounds, soft, nontender, nondistended  Musculoskeletal: No bilateral ankle edema, LLE in post-op bandage  Psychiatric: Appropriate affect, cooperative  Neurologic: Oriented x 3, strength symmetric in all extremities, Cranial Nerves grossly intact to confrontation, speech clear  Skin: No rashes    Exam unchanged from       Results Reviewed:  LAB RESULTS:      Lab 22  0618 22  0539 22  0549 22  0918   WBC 9.74 11.13* 12.97* 7.81   HEMOGLOBIN 12.1* 12.1* 11.7* 13.4   HEMATOCRIT 35.3* 36.4* 35.0* 40.0   PLATELETS 229 237 271 264   NEUTROS ABS  --   --  10.89*  --    IMMATURE GRANS (ABS)  --   --  0.06*  --    LYMPHS ABS  --   --  0.99  --    MONOS ABS  --   --  0.94*  --    EOS ABS  --   --  0.01  --    MCV 86.9 88.6 86.6 87.1         Lab 22  0618 22  0539 22  0549 22  1416 229 22  0820   SODIUM 140 140 136 137  --  137   POTASSIUM 4.4 4.2 4.1 4.2 4.4 4.0   CHLORIDE 107 105 100 100  --  103   CO2 21.0* 25.0 23.0 22.0  --  26.0   ANION GAP 12.0 10.0  13.0 15.0  --  8.0   BUN 38* 32* 28* 22  --  15   CREATININE 2.35* 2.08* 1.89* 1.33*  --  1.18   EGFR 27.8* 32.2* 36.1* 55.1*  --  63.6   GLUCOSE 214* 165* 300* 288*  --  233*   CALCIUM 8.1* 8.3* 8.3* 8.7  --  8.7                 Lab 08/04/22  0539   CHOLESTEROL 180   LDL CHOL 113*   HDL CHOL 39*   TRIGLYCERIDES 160*             Brief Urine Lab Results  (Last result in the past 365 days)      Color   Clarity   Blood   Leuk Est   Nitrite   Protein   CREAT   Urine HCG        02/27/22 1321 Dark Yellow   Clear   Negative   Negative   Negative   2+                 Microbiology Results Abnormal     Procedure Component Value - Date/Time    Anaerobic Culture - Tissue, Toe, Left [[id number]] Collected: 08/02/22 1027    Lab Status: Preliminary result Specimen: Tissue from Toe, Left Updated: 08/05/22 0742     Anaerobic Culture No anaerobes isolated at 3 days    Blood Culture - Blood, Hand, Left [[id number]]  (Normal) Collected: 07/27/22 2040    Lab Status: Final result Specimen: Blood from Hand, Left Updated: 08/01/22 2132     Blood Culture No growth at 5 days    Blood Culture - Blood, Hand, Right [[id number]]  (Normal) Collected: 07/27/22 2030    Lab Status: Final result Specimen: Blood from Hand, Right Updated: 08/01/22 2132     Blood Culture No growth at 5 days    COVID PRE-OP / PRE-PROCEDURE SCREENING ORDER (NO ISOLATION) - Swab, Nasopharynx [[id number]]  (Normal) Collected: 07/27/22 2246    Lab Status: Final result Specimen: Swab from Nasopharynx Updated: 07/27/22 9387    Narrative:      The following orders were created for panel order COVID PRE-OP / PRE-PROCEDURE SCREENING ORDER (NO ISOLATION) - Swab, Nasopharynx.  Procedure                               Abnormality         Status                     ---------                               -----------         ------                     COVID-19 and FLU A/B PCR...[[id number]]  Normal              Final result                 Please view results for these tests on the  individual orders.    COVID-19 and FLU A/B PCR - Swab, Nasopharynx [[id number]]  (Normal) Collected: 07/27/22 2246    Lab Status: Final result Specimen: Swab from Nasopharynx Updated: 07/27/22 4728     COVID19 Not Detected     Influenza A PCR Not Detected     Influenza B PCR Not Detected    Narrative:      Fact sheet for providers: https://www.fda.gov/media/225712/download    Fact sheet for patients: https://www.fda.gov/media/994861/download    Test performed by PCR.          No radiology results from the last 24 hrs        I have reviewed the medications:  Scheduled Meds:amLODIPine, 10 mg, Oral, Daily  aspirin, 81 mg, Oral, Daily  cefepime, 1 g, Intravenous, Q12H  cetirizine, 5 mg, Oral, Daily  DAPTOmycin, 6 mg/kg, Intravenous, Q24H  heparin (porcine), 5,000 Units, Subcutaneous, Q8H  insulin detemir, 30 Units, Subcutaneous, Nightly  insulin lispro, 0-7 Units, Subcutaneous, TID AC  Insulin Lispro, 5 Units, Subcutaneous, TID With Meals  levothyroxine, 50 mcg, Oral, Q AM  metoprolol succinate XL, 50 mg, Oral, Q24H  sodium chloride, 10 mL, Intravenous, Q12H  vitamin D3, 5,000 Units, Oral, Daily      Continuous Infusions:sodium chloride, 100 mL/hr, Last Rate: 100 mL/hr (08/05/22 2148)      PRN Meds:.•  acetaminophen **OR** acetaminophen **OR** acetaminophen  •  bisacodyl  •  dextrose  •  dextrose  •  glucagon (human recombinant)  •  HYDROcodone-acetaminophen  •  ondansetron **OR** ondansetron  •  phenol  •  sennosides-docusate  •  [COMPLETED] Insert peripheral IV **AND** sodium chloride  •  sodium chloride    Assessment & Plan   Assessment & Plan     Active Hospital Problems    Diagnosis  POA   • Proximal phalanx fracture of the second digit extending into the second metatarsal joint [S92.829A]  Unknown   • Dislocation of left shoulder joint, initial encounter [S43.005A]  Yes   • Cellulitis in diabetic foot (HCC) [E11.628, L03.119]  Yes   • Fall [W19.XXXA]  Yes   • Sepsis (HCC) [A41.9]  Yes   • Leukocytosis [D72.829]   Yes   • Lactic acidosis [E87.2]  Yes   • Elevated C-reactive protein (CRP) [R79.82]  Yes   • Elevated sed rate (elev SR) [R70.0]  Yes   • SSS (sick sinus syndrome) (HCC) [I49.5]  Yes   • Hyperlipidemia [E78.5]  Yes   • Hypertension [I10]  Yes   • S/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 Au [Z95.1]  Not Applicable   • Hypothyroidism (acquired) [E03.9]  Yes   • Type 2 diabetes mellitus (HCC) [E11.9]  Yes      Resolved Hospital Problems   No resolved problems to display.        Brief Hospital Course to date:  Jermaine Singh is a 77 y.o. male with PMH significant for HTN, HLD, CAD s/p CABG, PVD s/p RLE stent, CKD III, insulin-dependent DMII and hypothyroidism. He was admitted to [facility] 7/27/22 for L shoulder dislocation after a fall as well as sepsis secondary to L great toe osteomyelitis.     L great toe cellulitis / osteomyelitis  Peripheral vascular disease   - Followed by Dr. Joseph of podiatry - was on PO Doxycycline outpatient  - Bone scan concerning for osteomyelitis of L great toe   - s/p L great toe amputation by Dr. Márquez 8/2/22   - Appreciate cardiology evaluation - arterial duplex obtained. Cardiology does not recommend revascularization at this time   - ID following -  IV Zosyn/Vancomycin. Vanc on hold due to [place]     Fall   Anterior L shoulder dislocation  Acute fracture of the  proximal phalanx of the second digit extending into the second metatarsophalangeal joint  - s/p reduction in the ED  - Appreciate ortho (Dr. Gipson) assistance   - Recommend non-operative treatment. Sling for comfort.   - PT for range of motion exercises, avoid Abduction and external rotation. WBAT on LUE with walker or knee scooter  - Follow up with Dr. Gipson in 2-3 weeks      Insulin-dependent DMII  - Hgb A1c 9.0%  - Continue Levemir 30 units QHS and decrease Lispro to 5 units TID AC + SSI      [place] - worsening  - Baseline creatinine appears to be 0.9-1.2  - Creatinine 2.3 today, s/p IVF  - Cardiology has stopped  ACE, monitor, AM labs are pending today     Hypertension  Coronary artery disease s/p CABG  SSS s/p PPM   - Cardiology has stopped ACEI and added Amlodipine 10mg daily   - Continue Metoprolol XL 50mg daily      Hypothyroid  - Continue levothyroxine    Expected Discharge Location and Transportation: rehab  Expected Discharge Date: 8/6    DVT prophylaxis:  Medical and mechanical DVT prophylaxis orders are present.     AM-PAC 6 Clicks Score (PT): 11 (08/04/22 1633)    CODE STATUS:   Code Status and Medical Interventions:   Ordered at: 07/28/22 0000     Code Status (Patient has no pulse and is not breathing):    CPR (Attempt to Resuscitate)     Medical Interventions (Patient has pulse or is breathing):    Full Support       Rajani Farr DO  08/06/22

## 2022-08-06 NOTE — PROGRESS NOTES
Cardiothoracic Surgery Progress Note      POD #: 4-left great toe transmetatarsal amputation primary closure     LOS: 10 days      Subjective: Up in the recliner chair awake and alert    Objective:  Vital Signs vital signs below noted T-max past 24 hours 90.1 °F  Temp:  [97.6 °F (36.4 °C)-98.1 °F (36.7 °C)] 97.9 °F (36.6 °C)  Heart Rate:  [62-69] 65  Resp:  [18] 18  BP: (128-149)/(61-81) 149/78    Physical Exam:   General Appearance: Oriented x3   Lungs:   Heart:   Skin:   Incision: The amputation site dressing change.  Suture intact skin margin viable and skin flaps are viable.     Results:  Results from last 7 days   Lab Units 08/05/22  0618   WBC 10*3/mm3 9.74   HEMOGLOBIN g/dL 12.1*   HEMATOCRIT % 35.3*   PLATELETS 10*3/mm3 229     Results from last 7 days   Lab Units 08/05/22  0618   SODIUM mmol/L 140   POTASSIUM mmol/L 4.4   CHLORIDE mmol/L 107   CO2 mmol/L 21.0*   BUN mg/dL 38*   CREATININE mg/dL 2.35*   GLUCOSE mg/dL 214*   CALCIUM mg/dL 8.1*         Assessment: #1.  Postop day 4 left great toe transmetatarsal amputation primary closure, healing well  2.  Status post remote coronary bypass grafting  3.  Status post pacemaker placement for sick sinus syndrome  4.  Recent fall left shoulder dislocation was reduced in the emergency department  5.  Post endovascular stenting angioplasty of right lower extremity Dr. Hernandez in 2019      Plan: Continue daily dressing change amputation site.  Amputation toe unloading shoe when ambulating.  Antibiotics per infectious disease.  Medical manage per hospitalist.      Gopal Márquez MD - 08/06/22 - 06:12 EDT

## 2022-08-07 LAB
ANION GAP SERPL CALCULATED.3IONS-SCNC: 10 MMOL/L (ref 5–15)
BACTERIA SPEC ANAEROBE CULT: NORMAL
BUN SERPL-MCNC: 37 MG/DL (ref 8–23)
BUN/CREAT SERPL: 20.4 (ref 7–25)
CALCIUM SPEC-SCNC: 8.5 MG/DL (ref 8.6–10.5)
CHLORIDE SERPL-SCNC: 110 MMOL/L (ref 98–107)
CO2 SERPL-SCNC: 19 MMOL/L (ref 22–29)
CREAT SERPL-MCNC: 1.81 MG/DL (ref 0.76–1.27)
EGFRCR SERPLBLD CKD-EPI 2021: 38 ML/MIN/1.73
GLUCOSE BLDC GLUCOMTR-MCNC: 105 MG/DL (ref 70–130)
GLUCOSE BLDC GLUCOMTR-MCNC: 151 MG/DL (ref 70–130)
GLUCOSE BLDC GLUCOMTR-MCNC: 167 MG/DL (ref 70–130)
GLUCOSE BLDC GLUCOMTR-MCNC: 251 MG/DL (ref 70–130)
GLUCOSE SERPL-MCNC: 131 MG/DL (ref 65–99)
POTASSIUM SERPL-SCNC: 4.3 MMOL/L (ref 3.5–5.2)
SODIUM SERPL-SCNC: 139 MMOL/L (ref 136–145)

## 2022-08-07 PROCEDURE — 82962 GLUCOSE BLOOD TEST: CPT

## 2022-08-07 PROCEDURE — 80048 BASIC METABOLIC PNL TOTAL CA: CPT | Performed by: INTERNAL MEDICINE

## 2022-08-07 PROCEDURE — 99232 SBSQ HOSP IP/OBS MODERATE 35: CPT | Performed by: INTERNAL MEDICINE

## 2022-08-07 PROCEDURE — 25010000002 CEFEPIME PER 500 MG: Performed by: INTERNAL MEDICINE

## 2022-08-07 PROCEDURE — 25010000002 HEPARIN (PORCINE) PER 1000 UNITS: Performed by: PHYSICIAN ASSISTANT

## 2022-08-07 PROCEDURE — 99024 POSTOP FOLLOW-UP VISIT: CPT | Performed by: THORACIC SURGERY (CARDIOTHORACIC VASCULAR SURGERY)

## 2022-08-07 PROCEDURE — 25010000002 DAPTOMYCIN PER 1 MG: Performed by: INTERNAL MEDICINE

## 2022-08-07 PROCEDURE — 97530 THERAPEUTIC ACTIVITIES: CPT

## 2022-08-07 PROCEDURE — 63710000001 INSULIN LISPRO (HUMAN) PER 5 UNITS: Performed by: PHYSICIAN ASSISTANT

## 2022-08-07 PROCEDURE — 25010000002 HYDRALAZINE PER 20 MG: Performed by: PHYSICIAN ASSISTANT

## 2022-08-07 PROCEDURE — 63710000001 INSULIN DETEMIR PER 5 UNITS: Performed by: PHYSICIAN ASSISTANT

## 2022-08-07 RX ORDER — HYDRALAZINE HYDROCHLORIDE 20 MG/ML
10 INJECTION INTRAMUSCULAR; INTRAVENOUS ONCE
Status: COMPLETED | OUTPATIENT
Start: 2022-08-07 | End: 2022-08-07

## 2022-08-07 RX ADMIN — DAPTOMYCIN 600 MG: 500 INJECTION, POWDER, LYOPHILIZED, FOR SOLUTION INTRAVENOUS at 16:41

## 2022-08-07 RX ADMIN — Medication 5000 UNITS: at 08:48

## 2022-08-07 RX ADMIN — CEFEPIME HYDROCHLORIDE 1 G: 1 INJECTION, POWDER, FOR SOLUTION INTRAMUSCULAR; INTRAVENOUS at 05:33

## 2022-08-07 RX ADMIN — INSULIN LISPRO 5 UNITS: 100 INJECTION, SOLUTION INTRAVENOUS; SUBCUTANEOUS at 13:18

## 2022-08-07 RX ADMIN — HEPARIN SODIUM 5000 UNITS: 5000 INJECTION INTRAVENOUS; SUBCUTANEOUS at 13:18

## 2022-08-07 RX ADMIN — LEVOTHYROXINE SODIUM 50 MCG: 50 TABLET ORAL at 05:33

## 2022-08-07 RX ADMIN — CETIRIZINE HYDROCHLORIDE 5 MG: 10 TABLET, FILM COATED ORAL at 08:48

## 2022-08-07 RX ADMIN — AMLODIPINE BESYLATE 10 MG: 10 TABLET ORAL at 08:48

## 2022-08-07 RX ADMIN — HYDROCODONE BITARTRATE AND ACETAMINOPHEN 1 TABLET: 5; 325 TABLET ORAL at 05:33

## 2022-08-07 RX ADMIN — INSULIN DETEMIR 30 UNITS: 100 INJECTION, SOLUTION SUBCUTANEOUS at 21:32

## 2022-08-07 RX ADMIN — HEPARIN SODIUM 5000 UNITS: 5000 INJECTION INTRAVENOUS; SUBCUTANEOUS at 21:30

## 2022-08-07 RX ADMIN — HYDRALAZINE HYDROCHLORIDE 10 MG: 20 INJECTION INTRAMUSCULAR; INTRAVENOUS at 03:59

## 2022-08-07 RX ADMIN — INSULIN LISPRO 5 UNITS: 100 INJECTION, SOLUTION INTRAVENOUS; SUBCUTANEOUS at 17:49

## 2022-08-07 RX ADMIN — HEPARIN SODIUM 5000 UNITS: 5000 INJECTION INTRAVENOUS; SUBCUTANEOUS at 05:33

## 2022-08-07 RX ADMIN — METOPROLOL SUCCINATE 50 MG: 50 TABLET, EXTENDED RELEASE ORAL at 08:48

## 2022-08-07 RX ADMIN — ASPIRIN 81 MG CHEWABLE TABLET 81 MG: 81 TABLET CHEWABLE at 08:48

## 2022-08-07 RX ADMIN — CEFEPIME HYDROCHLORIDE 1 G: 1 INJECTION, POWDER, FOR SOLUTION INTRAMUSCULAR; INTRAVENOUS at 17:48

## 2022-08-07 RX ADMIN — INSULIN LISPRO 5 UNITS: 100 INJECTION, SOLUTION INTRAVENOUS; SUBCUTANEOUS at 08:48

## 2022-08-07 NOTE — PLAN OF CARE
Problem: Adult Inpatient Plan of Care  Goal: Plan of Care Review  Recent Flowsheet Documentation  Taken 8/7/2022 1257 by Hanane Castañeda OT  Plan of Care Reviewed With: patient  Outcome Evaluation: Pt is A/Ox4 and participates in therapy with frequent redirection. Education reinforced for LLE NWB precautions and off loading shoe donned for activity. Max A STS to RW. Pt unable to maintain LLE NWB x3 attempts. UIC, pt completes UB bathing/dressing with Min A. LUE ROM HEP completed with AROM avoiding ABDuction and ER. OT will continue to follow IP. Plan is IRF when medically ready.

## 2022-08-07 NOTE — PROGRESS NOTES
Cardiothoracic Surgery Progress Note      POD #: #5-left great toe transmetatarsal amputation primary closure     LOS: 11 days      Subjective: Up in recliner chair once again.    Objective:  Vital Signs vital signs below noted T-max past 24 hours 90.2 °F  Temp:  [97.7 °F (36.5 °C)-98.2 °F (36.8 °C)] 97.7 °F (36.5 °C)  Heart Rate:  [68-73] 68  Resp:  [13-16] 16  BP: (149-176)/(73-98) 176/91    Physical Exam:   General Appearance: Oriented x3   Lungs:   Heart:   Skin:   Incision: Amputation site dressing change.  Sutures intact.  Skin margin viable.  Skin flaps are viable.     Results:  Results from last 7 days   Lab Units 08/06/22  1222   WBC 10*3/mm3 9.19   HEMOGLOBIN g/dL 12.1*   HEMATOCRIT % 35.5*   PLATELETS 10*3/mm3 240     Results from last 7 days   Lab Units 08/06/22  1222   SODIUM mmol/L 142   POTASSIUM mmol/L 4.3   CHLORIDE mmol/L 110*   CO2 mmol/L 24.0   BUN mg/dL 35*   CREATININE mg/dL 2.01*   GLUCOSE mg/dL 128*   CALCIUM mg/dL 8.3*         Assessment: #1.  Postop day 5 left great toe transmetatarsal amputation primary closure healing well  2 status post remote coronary bypass grafting  3 status post pacemaker placement for sick sinus syndrome  4 recent fall shoulder dislocation on the left with reduction Emergency Department.  5.  Post endovascular stenting/angioplasty of the right lower extremity per Dr. Billy Hernandez in 2019.      Plan: Continue daily d dressing changes to the amputation site.  Medical manage per hospitalist.  Antibiotics per infectious disease.      Gopal Márquez MD - 08/07/22 - 05:51 EDT

## 2022-08-07 NOTE — PLAN OF CARE
Goal Outcome Evaluation:      Pt still opting to sleep in chair. No c/o pain. Dependent pulses requiring Doppler. Unloading shoe on L foot in place. Hypertensive with SBP > 170 requiring 1x 10mg IV hydralazine.

## 2022-08-07 NOTE — PROGRESS NOTES
[facility] Medicine Services  PROGRESS NOTE    Patient Name: Jermaine Singh  : 1945  MRN: [id number]    Date of Admission: 2022  Primary Care Physician: Farooq Painter MD    Subjective   Subjective     CC:  F/u osteomyelitis    HPI:  Patient resting in bedside chair, no complaints but not a fan of NWB on his foot.    ROS:  Gen- No fevers, chills  CV- No chest pain, palpitations  Resp- No cough, dyspnea  GI- No N/V/D, abd pain    Objective   Objective     Vital Signs:   Temp:  [97.7 °F (36.5 °C)-98.2 °F (36.8 °C)] 98.2 °F (36.8 °C)  Heart Rate:  [66-73] 66  Resp:  [13-16] 16  BP: (149-180)/(73-98) 180/81     Physical Exam:  Constitutional: No acute distress, awake, alert  HENT: NCAT, mucous membranes moist  Respiratory: Clear to auscultation bilaterally, respiratory effort normal   Cardiovascular: RRR, no murmurs, rubs, or gallops  Gastrointestinal: Positive bowel sounds, soft, nontender, nondistended  Musculoskeletal: No bilateral ankle edema, LLE in post-op bandage  Psychiatric: Appropriate affect, cooperative  Neurologic: Oriented x 3, strength symmetric in all extremities, Cranial Nerves grossly intact to confrontation, speech clear  Skin: No rashes    Exam unchanged from       Results Reviewed:  LAB RESULTS:      Lab 22  1222 22  0618 22  0539 22  0549 22  0918   WBC 9.19 9.74 11.13* 12.97* 7.81   HEMOGLOBIN 12.1* 12.1* 12.1* 11.7* 13.4   HEMATOCRIT 35.5* 35.3* 36.4* 35.0* 40.0   PLATELETS 240 229 237 271 264   NEUTROS ABS  --   --   --  10.89*  --    IMMATURE GRANS (ABS)  --   --   --  0.06*  --    LYMPHS ABS  --   --   --  0.99  --    MONOS ABS  --   --   --  0.94*  --    EOS ABS  --   --   --  0.01  --    MCV 87.2 86.9 88.6 86.6 87.1         Lab 22  1222 22  0618 22  0539 22  0549 22  1416   SODIUM 142 140 140 136 137   POTASSIUM 4.3 4.4 4.2 4.1 4.2   CHLORIDE 110* 107 105 100 100   CO2 24.0 21.0* 25.0 23.0  22.0   ANION GAP 8.0 12.0 10.0 13.0 15.0   BUN 35* 38* 32* 28* 22   CREATININE 2.01* 2.35* 2.08* 1.89* 1.33*   EGFR 33.5* 27.8* 32.2* 36.1* 55.1*   GLUCOSE 128* 214* 165* 300* 288*   CALCIUM 8.3* 8.1* 8.3* 8.3* 8.7                 Lab 08/04/22  0539   CHOLESTEROL 180   LDL CHOL 113*   HDL CHOL 39*   TRIGLYCERIDES 160*             Brief Urine Lab Results  (Last result in the past 365 days)      Color   Clarity   Blood   Leuk Est   Nitrite   Protein   CREAT   Urine HCG        02/27/22 1321 Dark Yellow   Clear   Negative   Negative   Negative   2+                 Microbiology Results Abnormal     Procedure Component Value - Date/Time    Anaerobic Culture - Tissue, Toe, Left [[id number]] Collected: 08/02/22 1027    Lab Status: Final result Specimen: Tissue from Toe, Left Updated: 08/07/22 0858     Anaerobic Culture No anaerobes isolated at 5 days    Blood Culture - Blood, Hand, Left [[id number]]  (Normal) Collected: 07/27/22 2040    Lab Status: Final result Specimen: Blood from Hand, Left Updated: 08/01/22 2132     Blood Culture No growth at 5 days    Blood Culture - Blood, Hand, Right [[id number]]  (Normal) Collected: 07/27/22 2030    Lab Status: Final result Specimen: Blood from Hand, Right Updated: 08/01/22 2132     Blood Culture No growth at 5 days    COVID PRE-OP / PRE-PROCEDURE SCREENING ORDER (NO ISOLATION) - Swab, Nasopharynx [[id number]]  (Normal) Collected: 07/27/22 2246    Lab Status: Final result Specimen: Swab from Nasopharynx Updated: 07/27/22 2357    Narrative:      The following orders were created for panel order COVID PRE-OP / PRE-PROCEDURE SCREENING ORDER (NO ISOLATION) - Swab, Nasopharynx.  Procedure                               Abnormality         Status                     ---------                               -----------         ------                     COVID-19 and FLU A/B PCR...[[id number]]  Normal              Final result                 Please view results for these tests on the individual  orders.    COVID-19 and FLU A/B PCR - Swab, Nasopharynx [[id number]]  (Normal) Collected: 07/27/22 2246    Lab Status: Final result Specimen: Swab from Nasopharynx Updated: 07/27/22 2354     COVID19 Not Detected     Influenza A PCR Not Detected     Influenza B PCR Not Detected    Narrative:      Fact sheet for providers: https://www.fda.gov/media/761584/download    Fact sheet for patients: https://www.fda.gov/media/337239/download    Test performed by PCR.          No radiology results from the last 24 hrs        I have reviewed the medications:  Scheduled Meds:amLODIPine, 10 mg, Oral, Daily  aspirin, 81 mg, Oral, Daily  cefepime, 1 g, Intravenous, Q12H  cetirizine, 5 mg, Oral, Daily  DAPTOmycin, 6 mg/kg, Intravenous, Q24H  heparin (porcine), 5,000 Units, Subcutaneous, Q8H  insulin detemir, 30 Units, Subcutaneous, Nightly  insulin lispro, 0-7 Units, Subcutaneous, TID AC  Insulin Lispro, 5 Units, Subcutaneous, TID With Meals  levothyroxine, 50 mcg, Oral, Q AM  metoprolol succinate XL, 50 mg, Oral, Q24H  sodium chloride, 10 mL, Intravenous, Q12H  vitamin D3, 5,000 Units, Oral, Daily      Continuous Infusions:sodium chloride, 100 mL/hr, Last Rate: 100 mL/hr (08/05/22 2148)      PRN Meds:.•  acetaminophen **OR** acetaminophen **OR** acetaminophen  •  bisacodyl  •  dextrose  •  dextrose  •  glucagon (human recombinant)  •  HYDROcodone-acetaminophen  •  ondansetron **OR** ondansetron  •  phenol  •  sennosides-docusate  •  [COMPLETED] Insert peripheral IV **AND** sodium chloride  •  sodium chloride    Assessment & Plan   Assessment & Plan     Active Hospital Problems    Diagnosis  POA   • Proximal phalanx fracture of the second digit extending into the second metatarsal joint [S92.729A]  Unknown   • Dislocation of left shoulder joint, initial encounter [S43.005A]  Yes   • Cellulitis in diabetic foot (HCC) [E11.628, L03.119]  Yes   • Fall [W19.XXXA]  Yes   • Sepsis (HCC) [A41.9]  Yes   • Leukocytosis [D72.829]  Yes   •  Lactic acidosis [E87.2]  Yes   • Elevated C-reactive protein (CRP) [R79.82]  Yes   • Elevated sed rate (elev SR) [R70.0]  Yes   • SSS (sick sinus syndrome) (HCC) [I49.5]  Yes   • Hyperlipidemia [E78.5]  Yes   • Hypertension [I10]  Yes   • S/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 Au [Z95.1]  Not Applicable   • Hypothyroidism (acquired) [E03.9]  Yes   • Type 2 diabetes mellitus (HCC) [E11.9]  Yes      Resolved Hospital Problems   No resolved problems to display.        Brief Hospital Course to date:  Jermaine Singh is a 77 y.o. male with PMH significant for HTN, HLD, CAD s/p CABG, PVD s/p RLE stent, CKD III, insulin-dependent DMII and hypothyroidism. He was admitted to [facility] 7/27/22 for L shoulder dislocation after a fall as well as sepsis secondary to L great toe osteomyelitis.     L great toe cellulitis / osteomyelitis  Peripheral vascular disease   - Followed by Dr. Joseph of podiatry - was on PO Doxycycline outpatient  - Bone scan concerning for osteomyelitis of L great toe   - s/p L great toe amputation by Dr. Márquez 8/2/22   - Appreciate cardiology evaluation - arterial duplex obtained. Cardiology does not recommend revascularization at this time   - ID following -  IV Zosyn/Vancomycin. Vanc on hold due to [place]     Fall   Anterior L shoulder dislocation  Acute fracture of the  proximal phalanx of the second digit extending into the second metatarsophalangeal joint  - s/p reduction in the ED  - Appreciate ortho (Dr. Gipson) assistance   - Recommend non-operative treatment. Sling for comfort.   - PT for range of motion exercises, avoid Abduction and external rotation. WBAT on LUE with walker or knee scooter  - Follow up with Dr. Gipson in 2-3 weeks      Insulin-dependent DMII  - Hgb A1c 9.0%  - Continue Levemir 30 units QHS and Lispro to 5 units TID AC + SSI      [place] - worsening  - Baseline creatinine appears to be 0.9-1.2  - Creatinine 2.0, AM labs are pending  - Cardiology has stopped ACE,  monitor     Hypertension  Coronary artery disease s/p CABG  SSS s/p PPM   - Cardiology has stopped ACEI and added Amlodipine 10mg daily   - Continue Metoprolol XL 50mg daily      Hypothyroid  - Continue levothyroxine    Expected Discharge Location and Transportation: rehab  Expected Discharge Date: 8/7    DVT prophylaxis:  Medical and mechanical DVT prophylaxis orders are present.     AM-PAC 6 Clicks Score (PT): 11 (08/04/22 1633)    CODE STATUS:   Code Status and Medical Interventions:   Ordered at: 07/28/22 0000     Code Status (Patient has no pulse and is not breathing):    CPR (Attempt to Resuscitate)     Medical Interventions (Patient has pulse or is breathing):    Full Support       Rajani Farr,   08/07/22

## 2022-08-07 NOTE — THERAPY TREATMENT NOTE
Patient Name: Jermaine Singh  : 1945    MRN: [id number]                              Today's Date: 2022       Admit Date: 2022    Visit Dx:     ICD-10-CM ICD-9-CM   1. Dislocation of left shoulder joint, initial encounter  S43.005A 831.00   2. Cellulitis of left foot  L03.116 682.7   3. 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  Preserved LV function   • Type 2 diabetes mellitus ([facility])   • Hypertension   • Hyperlipidemia   • Hypothyroidism (acquired)   • Vitamin D deficiency on Rx    • Serum Cr 1.32 on admission.  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 Au   • Dizziness   • Atherosclerosis of native artery of both lower extremities with intermittent claudication ([facility])   • SSS (sick sinus syndrome) ([facility])   • Dislocation of left shoulder joint, initial encounter   • Cellulitis in diabetic foot ([facility])   • Fall   • Sepsis ([facility])   • Leukocytosis   • Lactic acidosis   • Elevated C-reactive protein (CRP)   • Elevated sed rate (elev SR)   • Proximal phalanx fracture of the second digit extending into the second metatarsal joint     Past Medical History:   Diagnosis Date   • Asthma    • Coronary artery disease    • Diabetes mellitus ([facility])     started on [place] 2018; started on po meds in ; checking blood sugars daily    • Disease of thyroid gland     po meds daily for hypothyroidism    • History of fracture as a child     rt leg- severe    • Hyperlipidemia    • Hypertension    • Hypothyroidism    • Peripheral neuropathy    • Peripheral vascular disease ([facility])     s/p angiogram -needs stent in left leg    • RBBB    • Right knee pain    • Vitamin D deficiency      Past Surgical History:   Procedure Laterality Date   • APPENDECTOMY     • CARDIAC CATHETERIZATION N/A 2019    Procedure: Left Heart Cath;  Surgeon: Cooper Apodaca  MD ALEXY;  Location:  [facility];  Service: Cardiology   • CARDIAC ELECTROPHYSIOLOGY PROCEDURE N/A 5/18/2022    Procedure: DEVICE IMPLANT;  Surgeon: Cooper Apodaca MD;  Location:  [facility];  Service: Cardiology;  Laterality: N/A;   • COLONOSCOPY     • CORONARY ARTERY BYPASS GRAFT N/A 3/22/2019    Procedure: MEDIAN STERNOTOMY, CORONARY ARTERY BYPASS GRAFT X3, UTILIZING THE LEFT INTERNAL MAMMARY ARTERY, EVH AND OPEN HARVEST OF THE RIGHT GREATER SAPHENOUS VEIN, EXPLORATION OF THE LEFT LEG;  Surgeon: Ap Au MD;  Location:  [facility] OR;  Service: Cardiothoracic   • EYE SURGERY Bilateral     cataracts    • INTERVENTIONAL RADIOLOGY PROCEDURE N/A 7/29/2021    Procedure: Abdominal Aortagram with Runoff;  Surgeon: Jermaine Hernandez MD;  Location:  [facility];  Service: Cardiovascular;  Laterality: N/A;   • KNEE ARTHROSCOPY      right x 2, left x 1   • LACERATION REPAIR      right leg   • LEG SURGERY      2 for fracture of rt leg    • TONSILLECTOMY      Adnoidectomy   • TRANS METATARSAL AMPUTATION Left 8/2/2022    Procedure: GREAT TOE AMPUTATION LEFT;  Surgeon: Gopal Márquez MD;  Location:  [facility];  Service: Vascular;  Laterality: Left;      General Information     Row Name 08/07/22 1250          OT Time and Intention    Document Type therapy note (daily note)  -TB     Mode of Treatment occupational therapy;individual therapy  -TB     Row Name 08/07/22 1250          General Information    Patient Profile Reviewed yes  -TB     Existing Precautions/Restrictions fall;left;non-weight bearing;other (see comments)   s/p fall with L shoulder dislocation reduced in ED. Cleared for LUE AROM avoiding ABDuction and ER. s/p L great toe amp with NWB precautions. Offloading shoe issued.  -TB     Barriers to Rehab medically complex;previous functional deficit;impaired sensation;cognitive status  -TB     Row Name 08/07/22 1250          Cognition    Orientation Status  (Cognition) oriented x 4;other (see comments)  confusion noted in conversation  -TB     Row Name 08/07/22 1250          Safety Issues, Functional Mobility    Safety Issues Affecting Function (Mobility) insight into deficits/self-awareness;awareness of need for assistance;at risk behavior observed;safety precaution awareness;safety precautions follow-through/compliance;sequencing abilities  -TB     Impairments Affecting Function (Mobility) balance;cognition;endurance/activity tolerance;pain;strength;range of motion (ROM);postural/trunk control  -TB     Cognitive Impairments, Mobility Safety/Performance attention;awareness, need for assistance;impulsivity;insight into deficits/self-awareness;safety precaution follow-through;safety precaution awareness;sequencing abilities  -TB     Comment, Safety Issues/Impairments (Mobility) Pt unable to maintain LLE NWB precautions for mobility. Off loading shoe donned for session.  -TB           User Key  (r) = Recorded By, (t) = Taken By, (c) = Cosigned By    Initials Name Provider Type     Hanane Castañeda OT Occupational Therapist                 Mobility/ADL's     Row Name 08/07/22 1253          Bed Mobility    Comment, (Bed Mobility) UIC  -     Row Name 08/07/22 1253          Transfers    Transfers sit-stand transfer  -TB     Comment, (Transfers) Pt unable to maintain LLE NWB precautions for transfer training. Off loading shoe donned for session.  -TB     Sit-Stand [place] (Transfers) maximum assist (25% patient effort);verbal cues  -     Row Name 08/07/22 1253          Sit-Stand Transfer    Assistive Device (Sit-Stand Transfers) walker, front-wheeled  -     Row Name 08/07/22 1253          Functional Mobility    Functional Mobility- Comment Pt unable to maintain LLE NWB precautions for mobility. Off loading shoe donned for session.  -     Row Name 08/07/22 1253          Activities of Daily Living    BADL Assessment/Intervention upper body dressing;lower  body dressing;grooming;bathing  -TB     Row Name 08/07/22 1253          Mobility    Extremity Weight-bearing Status left upper extremity;left lower extremity  -TB     Left Upper Extremity (Weight-bearing Status) weight-bearing as tolerated (WBAT)  -TB     Left Lower Extremity (Weight-bearing Status) non weight-bearing (NWB)   -TB     Row Name 08/07/22 1253          Upper Body Dressing Assessment/Training    [place] Level (Upper Body Dressing) doff;don;pajama/robe;minimum assist (75% patient effort);verbal cues  -TB     Position (Upper Body Dressing) unsupported sitting  -TB     Row Name 08/07/22 1253          Self-Feeding Assessment/Training    [place] Level (Feeding) independent;liquids to mouth  -TB     Position (Self-Feeding) supported sitting  -TB     Row Name 08/07/22 1253          Lower Body Dressing Assessment/Training    [place] Level (Lower Body Dressing) don;doff;shoes/slippers;maximum assist (25% patient effort);verbal cues  -TB     Position (Lower Body Dressing) unsupported sitting  -TB     Comment, (Lower Body Dressing) R shoe donned for transfer training  -TB     Row Name 08/07/22 1253          Grooming Assessment/Training    [place] Level (Grooming) set up;wash face, hands;hair care, combing/brushing  -TB     Position (Grooming) supported sitting  -TB     Row Name 08/07/22 1253          Toileting Assessment/Training    [place] Level (Toileting) independent  -TB     Assistive Devices (Toileting) urinal  -TB     Position (Toileting) supported sitting  -TB     Row Name 08/07/22 1253          Bathing Assessment/Intervention    [place] Level (Bathing) minimum assist (75% patient effort);upper body;bathing skills  -TB     Position (Bathing) supported sitting  -TB           User Key  (r) = Recorded By, (t) = Taken By, (c) = Cosigned By    Initials Name Provider Type    Hanane Arguello OT Occupational Therapist               Obj/Interventions     Row Name 08/07/22 1257           Shoulder (Therapeutic Exercise)    Shoulder AROM (Therapeutic Exercise) bilateral;scapular retraction;sitting;10 repetitions  -     Row Name 08/07/22 1256          Elbow/Forearm (Therapeutic Exercise)    Elbow/Forearm (Therapeutic Exercise) AROM (active range of motion)  -     Elbow/Forearm AROM (Therapeutic Exercise) left;flexion;extension;supination;pronation;sitting;10 repetitions  -     Row Name 08/07/22 1256          Wrist (Therapeutic Exercise)    Wrist (Therapeutic Exercise) AROM (active range of motion)  -     Wrist AROM (Therapeutic Exercise) left;flexion;extension;10 repetitions  -     Row Name 08/07/22 1256          Hand (Therapeutic Exercise)    Hand (Therapeutic Exercise) AROM (active range of motion)  -     Hand AROM/AAROM (Therapeutic Exercise) left;AROM (active range of motion);finger flexion;finger extension;10 repetitions  -     Row Name 08/07/22 1256          Motor Skills    Therapeutic Exercise hand;wrist;elbow/forearm;shoulder  Pt cleared for LUE ROM avoiding ABDuction and ER.  -     Row Name 08/07/22 1256          Balance    Balance Assessment sitting dynamic balance;sit to stand dynamic balance;standing static balance  -     Dynamic Sitting Balance supervision;verbal cues  -TB     Position, Sitting Balance unsupported;sitting in chair  -     Sit to Stand Dynamic Balance maximum assist;verbal cues  -TB     Static Standing Balance maximum assist;verbal cues  -TB     Position/Device Used, Standing Balance supported;walker, front-wheeled  -     Balance Interventions sitting;standing;sit to stand;supported;dynamic reaching;occupation based/functional task;UE activity with balance activity  -     Comment, Balance Pt unable to maintain LLE NWB precautions  -           User Key  (r) = Recorded By, (t) = Taken By, (c) = Cosigned By    Initials Name Provider Type    TB Hanane Castañeda OT Occupational Therapist               Goals/Plan    No documentation.                 Clinical Impression     Row Name 08/07/22 1257          Pain Assessment    Pain Intervention(s) Ambulation/increased activity;Repositioned  -TB     Additional Documentation Pain Scale: FACES Pre/Post-Treatment (Group)  -TB     Row Name 08/07/22 1257          Pain Scale: FACES Pre/Post-Treatment    Pain: FACES Scale, Pretreatment 2-->hurts little bit  -TB     Posttreatment Pain Rating 2-->hurts little bit  -TB     Pain Location - Side/Orientation Left  -TB     Pain Location anterior  -TB     Pain Location - shoulder  -TB     Pre/Posttreatment Pain Comment Pt tolerates activity well  -TB     Row Name 08/07/22 1257          Plan of Care Review    Plan of Care Reviewed With patient  -TB     Outcome Evaluation Pt is A/Ox4 and participates in therapy with frequent redirection. Education reinforced for LLE NWB precautions and off loading shoe donned for activity. Max A STS to RW. Pt unable to maintain LLE NWB x3 attempts. UIC, pt completes UB bathing/dressing with Min A. LUE ROM HEP completed with AROM avoiding ABDuction and ER. OT will continue to follow IP. Plan is IRF when medically ready.  -TB     Row Name 08/07/22 1257          Therapy Plan Review/Discharge Plan (OT)    Anticipated Discharge Disposition (OT) inpatient rehabilitation facility  -TB     Row Name 08/07/22 1257          Vital Signs    Pre Systolic BP Rehab --  RN cleared OT  -TB     O2 Delivery Pre Treatment room air  -TB     O2 Delivery Intra Treatment room air  -TB     O2 Delivery Post Treatment room air  -TB     Pre Patient Position Sitting  -TB     Intra Patient Position Standing  -TB     Post Patient Position Sitting  -TB     Row Name 08/07/22 1257          Positioning and Restraints    Pre-Treatment Position sitting in chair/recliner  -TB     Post Treatment Position chair  -TB     In Chair notified nsg;reclined;call light within reach;encouraged to call for assist;exit alarm on;legs elevated  -TB           User Key  (r) = Recorded By, (t)  = Taken By, (c) = Cosigned By    Initials Name Provider Type    Hanane Arguello OT Occupational Therapist               Outcome Measures     Row Name 08/07/22 1301          How much help from another is currently needed...    Putting on and taking off regular lower body clothing? 2  -TB     Bathing (including washing, rinsing, and drying) 3  -TB     Toileting (which includes using toilet bed pan or urinal) 2  -TB     Putting on and taking off regular upper body clothing 3  -TB     Taking care of personal grooming (such as brushing teeth) 3  -TB     Eating meals 4  -TB     AM-PAC 6 Clicks Score (OT) 17  -TB     Row Name 08/07/22 1301          Functional Assessment    Outcome Measure Options AM-PAC 6 Clicks Daily Activity (OT)  -TB           User Key  (r) = Recorded By, (t) = Taken By, (c) = Cosigned By    Initials Name Provider Type    Hanane Arguello OT Occupational Therapist                Occupational Therapy Education                 Title: PT OT SLP Therapies (Done)     Topic: Occupational Therapy (Done)     Point: ADL training (Done)     Description:   Instruct learner(s) on proper safety adaptation and remediation techniques during self care or transfers.   Instruct in proper use of assistive devices.              Learning Progress Summary           Patient Acceptance, E,D, VU,NR by  at 8/7/2022 1301      Show all documentation for this point (6)                 Point: Home exercise program (Done)     Description:   Instruct learner(s) on appropriate technique for monitoring, assisting and/or progressing therapeutic exercises/activities.              Learning Progress Summary           Patient Acceptance, E,D, VU,NR by  at 8/7/2022 1301      Show all documentation for this point (6)                 Point: Precautions (Done)     Description:   Instruct learner(s) on prescribed precautions during self-care and functional transfers.              Learning Progress Summary            Patient Acceptance, E,D, VU,NR by  at 8/7/2022 1301      Show all documentation for this point (6)                 Point: Body mechanics (Done)     Description:   Instruct learner(s) on proper positioning and spine alignment during self-care, functional mobility activities and/or exercises.              Learning Progress Summary           Patient Acceptance, E, VU by MR at 8/5/2022 1557      Show all documentation for this point (2)                             User Key     Initials Effective Dates Name Provider Type Discipline     06/16/21 -  Hanane Castañeda OT Occupational Therapist OT    MR 10/06/21 -  Rosalinda Silva OT Occupational Therapist OT              OT Recommendation and Plan  Therapy Frequency (OT): daily  Plan of Care Review  Plan of Care Reviewed With: patient  Progress: improving  Outcome Evaluation: Pt is A/Ox4 and participates in therapy with frequent redirection. Education reinforced for LLE NWB precautions and off loading shoe donned for activity. Max A STS to RW. Pt unable to maintain LLE NWB x3 attempts. UIC, pt completes UB bathing/dressing with Min A. LUE ROM HEP completed with AROM avoiding ABDuction and ER. OT will continue to follow IP. Plan is IRF when medically ready.     Time Calculation:    Time Calculation- OT     Row Name 08/07/22 1128             Time Calculation- OT    OT Start Time 1128  -TB      OT Received On 08/07/22  -TB      OT Goal Re-Cert Due Date 08/14/22  -TB              Timed Charges    65714 - OT Therapeutic Activity Minutes 24  -TB              Total Minutes    Timed Charges Total Minutes 24  -TB       Total Minutes 24  -TB            User Key  (r) = Recorded By, (t) = Taken By, (c) = Cosigned By    Initials Name Provider Type     Hanane Castañeda, OT Occupational Therapist              Therapy Charges for Today     Code Description Service Date Service Provider Modifiers Qty    87317633331 HC OT THERAPEUTIC ACT EA 15 MIN 8/7/2022 Hanane Castañeda  Loyda, OT GO 2               Hanane Castañeda OT  8/7/2022

## 2022-08-08 LAB
ANION GAP SERPL CALCULATED.3IONS-SCNC: 11 MMOL/L (ref 5–15)
BUN SERPL-MCNC: 35 MG/DL (ref 8–23)
BUN/CREAT SERPL: 20.6 (ref 7–25)
CALCIUM SPEC-SCNC: 8.6 MG/DL (ref 8.6–10.5)
CHLORIDE SERPL-SCNC: 106 MMOL/L (ref 98–107)
CO2 SERPL-SCNC: 20 MMOL/L (ref 22–29)
CREAT SERPL-MCNC: 1.7 MG/DL (ref 0.76–1.27)
EGFRCR SERPLBLD CKD-EPI 2021: 41 ML/MIN/1.73
GLUCOSE BLDC GLUCOMTR-MCNC: 114 MG/DL (ref 70–130)
GLUCOSE BLDC GLUCOMTR-MCNC: 159 MG/DL (ref 70–130)
GLUCOSE BLDC GLUCOMTR-MCNC: 203 MG/DL (ref 70–130)
GLUCOSE BLDC GLUCOMTR-MCNC: 93 MG/DL (ref 70–130)
GLUCOSE SERPL-MCNC: 243 MG/DL (ref 65–99)
POTASSIUM SERPL-SCNC: 4.2 MMOL/L (ref 3.5–5.2)
SODIUM SERPL-SCNC: 137 MMOL/L (ref 136–145)

## 2022-08-08 PROCEDURE — 25010000002 ONDANSETRON PER 1 MG: Performed by: PHYSICIAN ASSISTANT

## 2022-08-08 PROCEDURE — 25010000002 AMPICILLIN-SULBACTAM PER 1.5 G: Performed by: INTERNAL MEDICINE

## 2022-08-08 PROCEDURE — 82962 GLUCOSE BLOOD TEST: CPT

## 2022-08-08 PROCEDURE — 97110 THERAPEUTIC EXERCISES: CPT

## 2022-08-08 PROCEDURE — 99024 POSTOP FOLLOW-UP VISIT: CPT | Performed by: THORACIC SURGERY (CARDIOTHORACIC VASCULAR SURGERY)

## 2022-08-08 PROCEDURE — 99232 SBSQ HOSP IP/OBS MODERATE 35: CPT | Performed by: INTERNAL MEDICINE

## 2022-08-08 PROCEDURE — 97530 THERAPEUTIC ACTIVITIES: CPT

## 2022-08-08 PROCEDURE — 63710000001 INSULIN LISPRO (HUMAN) PER 5 UNITS: Performed by: PHYSICIAN ASSISTANT

## 2022-08-08 PROCEDURE — 25010000002 HEPARIN (PORCINE) PER 1000 UNITS: Performed by: PHYSICIAN ASSISTANT

## 2022-08-08 PROCEDURE — 63710000001 INSULIN DETEMIR PER 5 UNITS: Performed by: PHYSICIAN ASSISTANT

## 2022-08-08 PROCEDURE — 25010000002 CEFEPIME PER 500 MG: Performed by: INTERNAL MEDICINE

## 2022-08-08 PROCEDURE — 80048 BASIC METABOLIC PNL TOTAL CA: CPT | Performed by: INTERNAL MEDICINE

## 2022-08-08 RX ADMIN — INSULIN LISPRO 5 UNITS: 100 INJECTION, SOLUTION INTRAVENOUS; SUBCUTANEOUS at 08:12

## 2022-08-08 RX ADMIN — HEPARIN SODIUM 5000 UNITS: 5000 INJECTION INTRAVENOUS; SUBCUTANEOUS at 15:23

## 2022-08-08 RX ADMIN — LEVOTHYROXINE SODIUM 50 MCG: 50 TABLET ORAL at 05:20

## 2022-08-08 RX ADMIN — ASPIRIN 81 MG CHEWABLE TABLET 81 MG: 81 TABLET CHEWABLE at 08:12

## 2022-08-08 RX ADMIN — CEFEPIME HYDROCHLORIDE 1 G: 1 INJECTION, POWDER, FOR SOLUTION INTRAMUSCULAR; INTRAVENOUS at 05:21

## 2022-08-08 RX ADMIN — ONDANSETRON 4 MG: 2 INJECTION INTRAMUSCULAR; INTRAVENOUS at 16:23

## 2022-08-08 RX ADMIN — Medication 10 ML: at 08:13

## 2022-08-08 RX ADMIN — INSULIN LISPRO 2 UNITS: 100 INJECTION, SOLUTION INTRAVENOUS; SUBCUTANEOUS at 17:22

## 2022-08-08 RX ADMIN — CETIRIZINE HYDROCHLORIDE 5 MG: 10 TABLET, FILM COATED ORAL at 08:12

## 2022-08-08 RX ADMIN — ONDANSETRON 4 MG: 2 INJECTION INTRAMUSCULAR; INTRAVENOUS at 21:39

## 2022-08-08 RX ADMIN — Medication 5000 UNITS: at 08:12

## 2022-08-08 RX ADMIN — SODIUM CHLORIDE 3 G: 900 INJECTION INTRAVENOUS at 23:22

## 2022-08-08 RX ADMIN — INSULIN LISPRO 3 UNITS: 100 INJECTION, SOLUTION INTRAVENOUS; SUBCUTANEOUS at 08:11

## 2022-08-08 RX ADMIN — AMLODIPINE BESYLATE 10 MG: 10 TABLET ORAL at 08:13

## 2022-08-08 RX ADMIN — HEPARIN SODIUM 5000 UNITS: 5000 INJECTION INTRAVENOUS; SUBCUTANEOUS at 21:39

## 2022-08-08 RX ADMIN — INSULIN DETEMIR 30 UNITS: 100 INJECTION, SOLUTION SUBCUTANEOUS at 21:38

## 2022-08-08 RX ADMIN — SODIUM CHLORIDE 3 G: 900 INJECTION INTRAVENOUS at 17:23

## 2022-08-08 RX ADMIN — INSULIN LISPRO 5 UNITS: 100 INJECTION, SOLUTION INTRAVENOUS; SUBCUTANEOUS at 17:22

## 2022-08-08 RX ADMIN — Medication 10 ML: at 21:39

## 2022-08-08 RX ADMIN — METOPROLOL SUCCINATE 50 MG: 50 TABLET, EXTENDED RELEASE ORAL at 08:12

## 2022-08-08 RX ADMIN — HEPARIN SODIUM 5000 UNITS: 5000 INJECTION INTRAVENOUS; SUBCUTANEOUS at 05:20

## 2022-08-08 NOTE — CASE MANAGEMENT/SOCIAL WORK
Continued Stay Note  [facility]     Patient Name: Jermaine Singh  MRN: [id number]  Today's Date: 8/8/2022    Admit Date: 7/27/2022     Discharge Plan     Row Name 08/08/22 1443       Plan    Plan [facility]    Plan Comments Case mgt f/u. We discussed d/c plan. He is still interested in goiung to [facility]. [facility] has been following and will submit to their MD today for approval for the acute level of rehab. case mgt will follow               Discharge Codes    No documentation.               Expected Discharge Date and Time     Expected Discharge Date Expected Discharge Time    Aug 9, 2022             Sonja C Kellerman, RN

## 2022-08-08 NOTE — PROGRESS NOTES
Jermaine Singh  1945  S383/1        Patient has done well over wknd.  Plan for rehab at discharge.  BP has been better controlled.  Cr is improving, was 1.81 yesterday.    Vitals:    08/08/22 0740   BP: 127/84   Pulse: 75   Resp: 18   Temp: 97.8 °F (36.6 °C)   SpO2:      Okay for discharge to rehab from cardiology standpoint.  He has a follow up previously scheduled for 12/28/2022 1:30 pm with device check and should keep that appt.  Recommend DC on the following cardiac medications: Amlodipine 10 mg 1 p.o. daily, EC aspirin 81 mg daily, Toprol-XL 50 mg daily.      Megan Shukla PA-C working with Dr. Cooepr Apodaca

## 2022-08-08 NOTE — THERAPY TREATMENT NOTE
Patient Name: Jermaine Singh  : 1945    MRN: [id number]                              Today's Date: 2022       Admit Date: 2022    Visit Dx:     ICD-10-CM ICD-9-CM   1. Dislocation of left shoulder joint, initial encounter  S43.005A 831.00   2. Cellulitis of left foot  L03.116 682.7   3. 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  Preserved LV function   • Type 2 diabetes mellitus (Formerly [facility])   • Hypertension   • Hyperlipidemia   • Hypothyroidism (acquired)   • Vitamin D deficiency on Rx    • Serum Cr 1.32 on admission.  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 Au   • Dizziness   • Atherosclerosis of native artery of both lower extremities with intermittent claudication (Formerly [facility])   • SSS (sick sinus syndrome) (Formerly [facility])   • Dislocation of left shoulder joint, initial encounter   • Cellulitis in diabetic foot (Formerly [facility])   • Fall   • Sepsis (Formerly [facility])   • Leukocytosis   • Lactic acidosis   • Elevated C-reactive protein (CRP)   • Elevated sed rate (elev SR)   • Proximal phalanx fracture of the second digit extending into the second metatarsal joint     Past Medical History:   Diagnosis Date   • Asthma    • Coronary artery disease    • Diabetes mellitus (Formerly [facility])     started on [place] 2018; started on po meds in ; checking blood sugars daily    • Disease of thyroid gland     po meds daily for hypothyroidism    • History of fracture as a child     rt leg- severe    • Hyperlipidemia    • Hypertension    • Hypothyroidism    • Peripheral neuropathy    • Peripheral vascular disease ([facility]ly [facility])     s/p angiogram -needs stent in left leg    • RBBB    • Right knee pain    • Vitamin D deficiency      Past Surgical History:   Procedure Laterality Date   • APPENDECTOMY     • CARDIAC CATHETERIZATION N/A 2019    Procedure: Left Heart Cath;  Surgeon: Cooper Apodaca  MD ALEXY;  Location:  [facility] CATH INVASIVE LOCATION;  Service: Cardiology   • CARDIAC ELECTROPHYSIOLOGY PROCEDURE N/A 5/18/2022    Procedure: DEVICE IMPLANT;  Surgeon: Cooper Apodaca MD;  Location:  [facility] CATH INVASIVE LOCATION;  Service: Cardiology;  Laterality: N/A;   • COLONOSCOPY     • CORONARY ARTERY BYPASS GRAFT N/A 3/22/2019    Procedure: MEDIAN STERNOTOMY, CORONARY ARTERY BYPASS GRAFT X3, UTILIZING THE LEFT INTERNAL MAMMARY ARTERY, EVH AND OPEN HARVEST OF THE RIGHT GREATER SAPHENOUS VEIN, EXPLORATION OF THE LEFT LEG;  Surgeon: Ap Au MD;  Location:  [facility] OR;  Service: Cardiothoracic   • EYE SURGERY Bilateral     cataracts    • INTERVENTIONAL RADIOLOGY PROCEDURE N/A 7/29/2021    Procedure: Abdominal Aortagram with Runoff;  Surgeon: Jermaine Hernandez MD;  Location:  [facility] CATH INVASIVE LOCATION;  Service: Cardiovascular;  Laterality: N/A;   • KNEE ARTHROSCOPY      right x 2, left x 1   • LACERATION REPAIR      right leg   • LEG SURGERY      2 for fracture of rt leg    • TONSILLECTOMY      Adnoidectomy   • TRANS METATARSAL AMPUTATION Left 8/2/2022    Procedure: GREAT TOE AMPUTATION LEFT;  Surgeon: Gopal Márquez MD;  Location:  [facility] OR;  Service: Vascular;  Laterality: Left;      General Information     Row Name 08/08/22 1258          Physical Therapy Time and Intention    Document Type therapy note (daily note)  -     Mode of Treatment physical therapy  -     Row Name 08/08/22 1258          General Information    Patient Profile Reviewed yes  -FW     Existing Precautions/Restrictions fall;left;non-weight bearing;other (see comments)  -FW     Row Name 08/08/22 1258          Cognition    Orientation Status (Cognition) oriented x 3  -FW     Row Name 08/08/22 1258          Safety Issues, Functional Mobility    Impairments Affecting Function (Mobility) balance;cognition;endurance/activity tolerance;pain;strength;range of motion (ROM);postural/trunk control  -FW           User Key  (r) =  Recorded By, (t) = Taken By, (c) = Cosigned By    Initials Name Provider Type    FW Jorge Iyer PT Physical Therapist               Mobility     Row Name 08/08/22 1259          Bed Mobility    Comment, (Bed Mobility) UIC  -FW     Row Name 08/08/22 1259          Bed-Chair Transfer    Bed-Chair [place] (Transfers) dependent (less than 25% patient effort);2 person assist  -     Assistive Device (Bed-Chair Transfers) lift device  -FW     Comment, (Bed-Chair Transfer) pt not able to maintain NWB to transfer safely w/o use of lift defice  -FW     Row Name 08/08/22 1259          Sit-Stand Transfer    Sit-Stand [place] (Transfers) verbal cues;nonverbal cues (demo/gesture);maximum assist (25% patient effort)  -FW     Assistive Device (Sit-Stand Transfers) walker, front-wheeled  -FW     Comment, (Sit-Stand Transfer) vc to extend leg to help maintain NWB; however, pt continued to WB to some degree through the LLE  -FW     Row Name 08/08/22 1259          Gait/Stairs (Locomotion)    [place] Level (Gait) not tested  -FW     Comment, (Gait/Stairs) pt unable to WB through LUE well enough to hold self up w/ a RW to maintain LLE NWB restrictions  -     Row Name 08/08/22 1259          Mobility    Extremity Weight-bearing Status left upper extremity;left lower extremity  -FW     Left Upper Extremity (Weight-bearing Status) weight-bearing as tolerated (WBAT)  -FW     Left Lower Extremity (Weight-bearing Status) non weight-bearing (NWB)  -FW           User Key  (r) = Recorded By, (t) = Taken By, (c) = Cosigned By    Initials Name Provider Type    FW Jorge Iyer PT Physical Therapist               Obj/Interventions     Row Name 08/08/22 1304          Motor Skills    Therapeutic Exercise hip;knee;ankle  -     Row Name 08/08/22 1304          Hip (Therapeutic Exercise)    Hip (Therapeutic Exercise) strengthening exercise;isometric exercises  -     Hip Isometrics (Therapeutic Exercise) gluteal sets;10  repetitions  -FW     Hip Strengthening (Therapeutic Exercise) left;aBduction;aDduction;marching while standing;10 repetitions  standing and supine hip abd x10  -     Row Name 08/08/22 1304          Knee (Therapeutic Exercise)    Knee (Therapeutic Exercise) isometric exercises;strengthening exercise  -     Knee Isometrics (Therapeutic Exercise) left;quad sets;10 repetitions  -     Knee Strengthening (Therapeutic Exercise) left;SLR (straight leg raise);LAQ (long arc quad);10 repetitions;heel slides  -     Row Name 08/08/22 1304          Ankle (Therapeutic Exercise)    Ankle (Therapeutic Exercise) AROM (active range of motion)  -     Ankle AROM (Therapeutic Exercise) bilateral;dorsiflexion;plantarflexion;10 repetitions  -     Row Name 08/08/22 1304          Balance    Balance Assessment sitting static balance;sitting dynamic balance;standing static balance  -     Static Sitting Balance supervision  -     Dynamic Sitting Balance standby assist  -FW     Position, Sitting Balance sitting in chair  -     Static Standing Balance moderate assist;maximum assist;2-person assist  -     Position/Device Used, Standing Balance supported;walker, front-wheeled  -           User Key  (r) = Recorded By, (t) = Taken By, (c) = Cosigned By    Initials Name Provider Type    FW Jorge Iyer PT Physical Therapist               Goals/Plan    No documentation.                Clinical Impression     Row Name 08/08/22 1303          Pain    Pretreatment Pain Rating 2/10  -FW     Posttreatment Pain Rating 5/10  -     Pain Location - Side/Orientation Left  -     Pain Location - shoulder  -     Pain Intervention(s) Repositioned;Ambulation/increased activity  -     Row Name 08/08/22 1305          Plan of Care Review    Plan of Care Reviewed With patient  -     Outcome Evaluation Pt continues to have difficulty maintain NWB through the LLE and noted that left shoulder pain increases while WB through the RW.  While standing, the pt intermittently shifted weight through his LLE despite verbal and tactile cues to maintain NWB. Unable to progress mobility this session. Continue PT POC. Recommend dc IPR.  -FW     Row Name 08/08/22 1308          Vital Signs    Pre Systolic BP Rehab --  vss  -FW     O2 Delivery Pre Treatment room air  -FW     O2 Delivery Intra Treatment room air  -FW     O2 Delivery Post Treatment room air  -FW     Pre Patient Position Sitting  -FW     Intra Patient Position Standing  -FW     Post Patient Position Supine  -FW     Row Name 08/08/22 1308          Positioning and Restraints    Pre-Treatment Position sitting in chair/recliner  -FW     Post Treatment Position bed  -FW     In Bed notified nsg;call light within reach;encouraged to call for assist;exit alarm on;with family/caregiver  -FW           User Key  (r) = Recorded By, (t) = Taken By, (c) = Cosigned By    Initials Name Provider Type    Jorge Kwon PT Physical Therapist               Outcome Measures     Row Name 08/08/22 1313          How much help from another person do you currently need...    Turning from your back to your side while in flat bed without using bedrails? 3  -FW     Moving from lying on back to sitting on the side of a flat bed without bedrails? 3  -FW     Moving to and from a bed to a chair (including a wheelchair)? 1  -FW     Standing up from a chair using your arms (e.g., wheelchair, bedside chair)? 2  -FW     Climbing 3-5 steps with a railing? 1  -FW     To walk in hospital room? 1  -FW     AM-PAC 6 Clicks Score (PT) 11  -FW     Highest level of mobility 4 --> Transferred to chair/commode  -FW     Row Name 08/08/22 1313          Functional Assessment    Outcome Measure Options AM-PAC 6 Clicks Basic Mobility (PT)  -FW           User Key  (r) = Recorded By, (t) = Taken By, (c) = Cosigned By    Initials Name Provider Type    Jorge Kwon PT Physical Therapist                             Physical Therapy  Education                 Title: PT OT SLP Therapies (Done)     Topic: Physical Therapy (Done)     Point: Mobility training (Done)     Learning Progress Summary           Patient Acceptance, E, VU by FW at 8/8/2022 1313      Show all documentation for this point (4)                 Point: Home exercise program (Done)     Learning Progress Summary           Patient Acceptance, E, VU by FW at 8/8/2022 1313      Show all documentation for this point (4)                 Point: Body mechanics (Done)     Learning Progress Summary           Patient Acceptance, E, VU by FW at 8/8/2022 1313      Show all documentation for this point (4)                 Point: Precautions (Done)     Learning Progress Summary           Patient Acceptance, E, VU by FW at 8/8/2022 1313      Show all documentation for this point (4)                             User Key     Initials Effective Dates Name Provider Type Discipline     05/05/22 -  Jorge Iyer, PT Physical Therapist PT              PT Recommendation and Plan  Planned Therapy Interventions (PT): balance training, bed mobility training, gait training, home exercise program, joint mobilization, neuromuscular re-education, orthotic fitting/training, patient/family education, ROM (range of motion), stair training, strengthening, transfer training, stretching  Plan of Care Reviewed With: patient  Outcome Evaluation: Pt continues to have difficulty maintain NWB through the LLE and noted that left shoulder pain increases while WB through the RW. While standing, the pt intermittently shifted weight through his LLE despite verbal and tactile cues to maintain NWB. Unable to progress mobility this session. Continue PT POC. Recommend dc IPR.     Time Calculation:    PT Charges     Row Name 08/08/22 1314             Time Calculation    Start Time 1135  -FW      PT Received On 08/08/22 -FW      PT Goal Re-Cert Due Date 08/14/22 -FW              Timed Charges    48980 - PT Therapeutic  Exercise Minutes 10  -FW      27339 - PT Therapeutic Activity Minutes 15  -FW              Total Minutes    Timed Charges Total Minutes 25  -FW       Total Minutes 25  -FW            User Key  (r) = Recorded By, (t) = Taken By, (c) = Cosigned By    Initials Name Provider Type    FW Jorge Iyer, PT Physical Therapist              Therapy Charges for Today     Code Description Service Date Service Provider Modifiers Qty    [id number]  PT THER PROC EA 15 MIN 8/8/2022 Jorge Iyer, PT GP 1    [id number]  PT THERAPEUTIC ACT EA 15 MIN 8/8/2022 Jorge Iyer, PT GP 1    [id number]  PT THER SUPP EA 15 MIN 8/8/2022 Jorge Iyer, PT GP 2          PT G-Codes  Outcome Measure Options: AM-PAC 6 Clicks Basic Mobility (PT)  AM-PAC 6 Clicks Score (PT): 11  AM-PAC 6 Clicks Score (OT): 17    Jorge Iyer PT  8/8/2022

## 2022-08-08 NOTE — PLAN OF CARE
Goal Outcome Evaluation:  Plan of Care Reviewed With: patient           Outcome Evaluation: Pt continues to have difficulty maintain NWB through the LLE and noted that left shoulder pain increases while using his arms to WB through the RW. While standing, the pt intermittently shifted weight through his LLE despite verbal and tactile cues to maintain NWB. Unable to progress mobility this session. Continue PT POC. Recommend dc IPR.

## 2022-08-08 NOTE — THERAPY TREATMENT NOTE
Patient Name: Jermaine Singh  : 1945    MRN: [id number]                              Today's Date: 2022       Admit Date: 2022    Visit Dx:     ICD-10-CM ICD-9-CM   1. Dislocation of left shoulder joint, initial encounter  S43.005A 831.00   2. Cellulitis of left foot  L03.116 682.7   3. 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  Preserved LV function   • Type 2 diabetes mellitus ([facility])   • Hypertension   • Hyperlipidemia   • Hypothyroidism (acquired)   • Vitamin D deficiency on Rx    • Serum Cr 1.32 on admission.  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 Au   • Dizziness   • Atherosclerosis of native artery of both lower extremities with intermittent claudication ([facility])   • SSS (sick sinus syndrome) ([facility])   • Dislocation of left shoulder joint, initial encounter   • Cellulitis in diabetic foot ([facility])   • Fall   • Sepsis ([facility])   • Leukocytosis   • Lactic acidosis   • Elevated C-reactive protein (CRP)   • Elevated sed rate (elev SR)   • Proximal phalanx fracture of the second digit extending into the second metatarsal joint     Past Medical History:   Diagnosis Date   • Asthma    • Coronary artery disease    • Diabetes mellitus ([facility])     started on in[place] 2018; started on po meds in ; checking blood sugars daily    • Disease of thyroid gland     po meds daily for hypothyroidism    • History of fracture as a child     rt leg- severe    • Hyperlipidemia    • Hypertension    • Hypothyroidism    • Peripheral neuropathy    • Peripheral vascular disease ([facility])     s/p angiogram -needs stent in left leg    • RBBB    • Right knee pain    • Vitamin D deficiency      Past Surgical History:   Procedure Laterality Date   • APPENDECTOMY     • CARDIAC CATHETERIZATION N/A 2019    Procedure: Left Heart Cath;  Surgeon: Cooper Apodaca  MD ALEXY;  Location:  [facility];  Service: Cardiology   • CARDIAC ELECTROPHYSIOLOGY PROCEDURE N/A 5/18/2022    Procedure: DEVICE IMPLANT;  Surgeon: Cooper Apodaca MD;  Location:  [facility];  Service: Cardiology;  Laterality: N/A;   • COLONOSCOPY     • CORONARY ARTERY BYPASS GRAFT N/A 3/22/2019    Procedure: MEDIAN STERNOTOMY, CORONARY ARTERY BYPASS GRAFT X3, UTILIZING THE LEFT INTERNAL MAMMARY ARTERY, EVH AND OPEN HARVEST OF THE RIGHT GREATER SAPHENOUS VEIN, EXPLORATION OF THE LEFT LEG;  Surgeon: Ap Au MD;  Location:  [facility];  Service: Cardiothoracic   • EYE SURGERY Bilateral     cataracts    • INTERVENTIONAL RADIOLOGY PROCEDURE N/A 7/29/2021    Procedure: Abdominal Aortagram with Runoff;  Surgeon: Jermaine Hernandez MD;  Location:  [facility];  Service: Cardiovascular;  Laterality: N/A;   • KNEE ARTHROSCOPY      right x 2, left x 1   • LACERATION REPAIR      right leg   • LEG SURGERY      2 for fracture of rt leg    • TONSILLECTOMY      Adnoidectomy   • TRANS METATARSAL AMPUTATION Left 8/2/2022    Procedure: GREAT TOE AMPUTATION LEFT;  Surgeon: Gopal Márquez MD;  Location:  [facility];  Service: Vascular;  Laterality: Left;      General Information     Row Name 08/08/22 1433          OT Time and Intention    Document Type therapy note (daily note)  -MR     Mode of Treatment occupational therapy  -     Row Name 08/08/22 1433          General Information    Patient Profile Reviewed yes  -MR     Existing Precautions/Restrictions fall;left;non-weight bearing;other (see comments)  No ER or shoulder abduction of the LUE- recent dislocation w/ reduction in the ED  -MR     Barriers to Rehab medically complex;previous functional deficit;cognitive status;impaired sensation  -MR     Row Name 08/08/22 1433          Cognition    Orientation Status (Cognition) oriented x 3  -MR     Row Name 08/08/22 1433          Safety Issues, Functional Mobility     Safety Issues Affecting Function (Mobility) insight into deficits/self-awareness;awareness of need for assistance;at risk behavior observed;safety precaution awareness;safety precautions follow-through/compliance;sequencing abilities;unable to maintain weight-bearing restrictions  -MR     Impairments Affecting Function (Mobility) balance;cognition;endurance/activity tolerance;pain;strength;range of motion (ROM);postural/trunk control  -MR     Cognitive Impairments, Mobility Safety/Performance attention;awareness, need for assistance;insight into deficits/self-awareness;safety precaution awareness;safety precaution follow-through;sequencing abilities  -MR           User Key  (r) = Recorded By, (t) = Taken By, (c) = Cosigned By    Initials Name Provider Type     Rosalinda Silva, OT Occupational Therapist                 Mobility/ADL's     Row Name 08/08/22 1435          Bed Mobility    Comment, (Bed Mobility) Pt deferred EOB/OOB d/t just getting in the bed w/ PT.  -MR     Row Name 08/08/22 1435          Transfers    Comment, (Transfers) Pt deferred EOB/OOB d/t just getting in the bed w/ PT.  -MR     Row Name 08/08/22 1435          Mobility    Extremity Weight-bearing Status left upper extremity;left lower extremity  -MR     Left Upper Extremity (Weight-bearing Status) weight-bearing as tolerated (WBAT)  -MR     Left Lower Extremity (Weight-bearing Status) non weight-bearing (NWB)  -MR           User Key  (r) = Recorded By, (t) = Taken By, (c) = Cosigned By    Initials Name Provider Type     Rosalinda Silva OT Occupational Therapist               Obj/Interventions     Row Name 08/08/22 1436          Shoulder (Therapeutic Exercise)    Shoulder (Therapeutic Exercise) AROM (active range of motion)  -MR     Shoulder AROM (Therapeutic Exercise) bilateral;scapular retraction;sitting;10 repetitions  -MR     Row Name 08/08/22 1436          Elbow/Forearm (Therapeutic Exercise)    Elbow/Forearm (Therapeutic Exercise) AROM  (active range of motion)  -MR     Elbow/Forearm AROM (Therapeutic Exercise) left;flexion;extension;supination;pronation;sitting;10 repetitions  -     Row Name 08/08/22 1436          Wrist (Therapeutic Exercise)    Wrist (Therapeutic Exercise) AROM (active range of motion)  -MR     Wrist AROM (Therapeutic Exercise) left;flexion;extension;10 repetitions  -MR Wiseman Name 08/08/22 1436          Hand (Therapeutic Exercise)    Hand (Therapeutic Exercise) AROM (active range of motion)  -MR     Hand AROM/AAROM (Therapeutic Exercise) left;AROM (active range of motion);finger flexion;finger extension;10 repetitions  -MR Wiseman Name 08/08/22 1436          Motor Skills    Therapeutic Exercise elbow/forearm;wrist;hand;shoulder  -MR           User Key  (r) = Recorded By, (t) = Taken By, (c) = Cosigned By    Initials Name Provider Type    Rosalinda Domingo, ANG Occupational Therapist               Goals/Plan    No documentation.                Clinical Impression     Row Name 08/08/22 1437          Pain Assessment    Pretreatment Pain Rating 5/10  -MR     Posttreatment Pain Rating 5/10  -MR     Pain Location - Side/Orientation Left  -MR     Pain Location generalized  -MR     Pain Location - shoulder  -MR     Pre/Posttreatment Pain Comment Pt reporting discomfort in the LUE this date.  -MR     Pain Intervention(s) Ambulation/increased activity;Repositioned  -MR Wiseman Name 08/08/22 1437          Plan of Care Review    Plan of Care Reviewed With patient  -MR     Progress no change  -MR     Outcome Evaluation Pt deferred OOB/EOB this session. Pt gave good effort w/ LUE ther ex completing scap retractions, elbow, wrist and hand AROM. Pt re-educated to avoid ER and ABDuction of the LUE. Continue to progress as able current POC. Continue to recommend IPR at d/c.  -MR Wiseman Name 08/08/22 1437          Therapy Plan Review/Discharge Plan (OT)    Anticipated Discharge Disposition (OT) inpatient rehabilitation facility  -MR Wiseman  Name 08/08/22 1437          Vital Signs    Pre Systolic BP Rehab --  VSS. Cleared by RN for treatment  -MR     O2 Delivery Pre Treatment room air  -MR     O2 Delivery Intra Treatment room air  -MR     O2 Delivery Post Treatment room air  -MR     Pre Patient Position Supine  -MR     Intra Patient Position Supine  -MR     Post Patient Position Supine  -MR     Row Name 08/08/22 1437          Positioning and Restraints    Pre-Treatment Position in bed  -MR     Post Treatment Position bed  -MR     In Bed notified nsg;call light within reach;encouraged to call for assist;exit alarm on;with family/caregiver;side rails up x2;fowlers  -MR           User Key  (r) = Recorded By, (t) = Taken By, (c) = Cosigned By    Initials Name Provider Type    Rosalinda Domingo, OT Occupational Therapist               Outcome Measures     Row Name 08/08/22 1440          How much help from another is currently needed...    Putting on and taking off regular lower body clothing? 2  -MR     Bathing (including washing, rinsing, and drying) 3  -MR     Toileting (which includes using toilet bed pan or urinal) 2  -MR     Putting on and taking off regular upper body clothing 3  -MR     Taking care of personal grooming (such as brushing teeth) 3  -MR     Eating meals 4  -MR     AM-PAC 6 Clicks Score (OT) 17  -MR     Row Name 08/08/22 1313          How much help from another person do you currently need...    Turning from your back to your side while in flat bed without using bedrails? 3  -FW     Moving from lying on back to sitting on the side of a flat bed without bedrails? 3  -FW     Moving to and from a bed to a chair (including a wheelchair)? 1  -FW     Standing up from a chair using your arms (e.g., wheelchair, bedside chair)? 2  -FW     Climbing 3-5 steps with a railing? 1  -FW     To walk in hospital room? 1  -FW     AM-PAC 6 Clicks Score (PT) 11  -FW     Highest level of mobility 4 --> Transferred to chair/commode  -FW     Row Name 08/08/22  1440 08/08/22 1313       Functional Assessment    Outcome Measure Options AM-PAC 6 Clicks Daily Activity (OT)  -MR AM-PAC 6 Clicks Basic Mobility (PT)  -FW          User Key  (r) = Recorded By, (t) = Taken By, (c) = Cosigned By    Initials Name Provider Type    FW Jorge Iyer, PT Physical Therapist    MR Ricardo Rosalinda, OT Occupational Therapist                Occupational Therapy Education                 Title: PT OT SLP Therapies (Done)     Topic: Occupational Therapy (Done)     Point: ADL training (Done)     Description:   Instruct learner(s) on proper safety adaptation and remediation techniques during self care or transfers.   Instruct in proper use of assistive devices.              Learning Progress Summary           Patient Acceptance, E, VU,NR by MR at 8/8/2022 1440      Show all documentation for this point (7)                 Point: Home exercise program (Done)     Description:   Instruct learner(s) on appropriate technique for monitoring, assisting and/or progressing therapeutic exercises/activities.              Learning Progress Summary           Patient Acceptance, E, VU,NR by MR at 8/8/2022 1440      Show all documentation for this point (7)                 Point: Precautions (Done)     Description:   Instruct learner(s) on prescribed precautions during self-care and functional transfers.              Learning Progress Summary           Patient Acceptance, E, VU,NR by MR at 8/8/2022 1440      Show all documentation for this point (7)                 Point: Body mechanics (Done)     Description:   Instruct learner(s) on proper positioning and spine alignment during self-care, functional mobility activities and/or exercises.              Learning Progress Summary           Patient Acceptance, E, VU,NR by MR at 8/8/2022 1440      Show all documentation for this point (3)                             User Key     Initials Effective Dates Name Provider Type Discipline    MR 10/06/21 -  Ricardo  ANG Tellez Occupational Therapist OT              OT Recommendation and Plan  Planned Therapy Interventions (OT): activity tolerance training, adaptive equipment training, BADL retraining, functional balance retraining, IADL retraining, occupation/activity based interventions, patient/caregiver education/training, transfer/mobility retraining, strengthening exercise, ROM/therapeutic exercise  Therapy Frequency (OT): daily  Plan of Care Review  Plan of Care Reviewed With: patient  Progress: no change  Outcome Evaluation: Pt deferred OOB/EOB this session. Pt gave good effort w/ LUE ther ex completing scap retractions, elbow, wrist and hand AROM. Pt re-educated to avoid ER and ABDuction of the LUE. Continue to progress as able current POC. Continue to recommend IPR at d/c.     Time Calculation:    Time Calculation- OT     Row Name 08/08/22 1441             Time Calculation- OT    OT Start Time 1304  -MR      OT Received On 08/08/22  -MR      OT Goal Re-Cert Due Date 08/14/22  -MR              Timed Charges    36019 - OT Therapeutic Exercise Minutes 8  -MR              Total Minutes    Timed Charges Total Minutes 8  -MR       Total Minutes 8  -MR            User Key  (r) = Recorded By, (t) = Taken By, (c) = Cosigned By    Initials Name Provider Type     Rajiv Silva OT Occupational Therapist              Therapy Charges for Today     Code Description Service Date Service Provider Modifiers Qty    89886666704  OT THER PROC EA 15 MIN 8/8/2022 Rajiv Silva OT GO 1               RAJIV SILVA OT  8/8/2022

## 2022-08-08 NOTE — PROGRESS NOTES
Cardiothoracic Surgery Progress Note      POD #: 6-left great toe transmetatarsal amputation primary closure   LOS: 12 days      Subjective: Awake and alert    Objective:  Vital Signs vital signs below noted T-max past 24 hours 90.2 °F  Temp:  [97.9 °F (36.6 °C)-98.2 °F (36.8 °C)] 98.2 °F (36.8 °C)  Heart Rate:  [63-72] 71  Resp:  [16-18] 18  BP: (114-180)/(72-84) 114/72    Physical Exam:   General Appearance: Oriented x3   Lungs:   Heart:   Skin:   Incision: Amputation dressing change.  Sutures intact skin margin viable skin flaps are viable.     Results:  Results from last 7 days   Lab Units 08/06/22  1222   WBC 10*3/mm3 9.19   HEMOGLOBIN g/dL 12.1*   HEMATOCRIT % 35.5*   PLATELETS 10*3/mm3 240     Results from last 7 days   Lab Units 08/07/22  1532   SODIUM mmol/L 139   POTASSIUM mmol/L 4.3   CHLORIDE mmol/L 110*   CO2 mmol/L 19.0*   BUN mg/dL 37*   CREATININE mg/dL 1.81*   GLUCOSE mg/dL 131*   CALCIUM mg/dL 8.5*         Assessment: #1.  Postop day 6 left great toe transmetatarsal amputation primary closure healing well  2.  Status post remote coronary bypass grafting  3 status post pacemaker placement for sick sinus syndrome  4 recent fall with shoulder dislocation on the left reduction was done emergency department  5.  Post endovascular stent/angioplasty right lower extremity per Dr. Billy Hernandez in 2019      Plan: Continue daily dressing changes to the amputation site.  Medical manage per hospitalist.  Antibiotics per infectious disease.      Gopal Márquez MD - 08/08/22 - 05:29 EDT

## 2022-08-08 NOTE — PROGRESS NOTES
Jermaine JIMENES Francisco  1945  [id number]    Date of Consult: 8/8/2022    Admission Date: 7/27/2022      Requesting Provider: No ref. provider found  Evaluating Physician: Sage Braga MD    Reason for Consultation: Evaluation of toe wound    History of present illness:    Patient is a 77 y.o. male with coronary disease history of CABG diabetes mellitus type 2 hypothyroidism hypertension hyperlipidemia prevascular disease presents the emergency room after having a fall and injuring left shoulder patient tripped while walking his dog.  Coincidentally patient wears an orthotic shoe as recommended by a podiatrist has been on oral doxycycline for a left toe wound we are asked to evaluate this patient to start on broad-spectrum antibiotics occluding vancomycin and Zosyn.  X-ray of left foot showed soft tissue swelling in the forefoot without obvious fracture or osteomyelitis.    Has history of pacemaker    History of vascular stent placed in right leg.    7/29/2022; is having bone scan today denies fevers rash sore throat or diarrhea    7/30/22:  Bone scan compatible with osteomyelitis distal phalanx of first digit, acute fracture 2nd digit.  Afebrile. Blood cultures no growth to date.     7/31/22:  Afebrile.   Dr. Márquez consulting Dr. Apodaca for vascular studies left lower extremity.   No n/v/d.  Complains of shoulder pain.     8/2/22; doing well; had surgery today; toe amputation, no fever, rash, sore throat    8/3/22; no events overnight; resting quietly no events overnight    8/4/22; doing well; no events overnight; no fever, rash, sore throat;   8/5/22; no events overnight; no fever, rash, sore throat, no diarrhea    8/6/22; no events overnight; no fever, rash, sore throat  Past Medical History:   Diagnosis Date   • Asthma    • Coronary artery disease    • Diabetes mellitus (HCC) 2000    started on inuslin 12/2018; started on po meds in 2000; checking blood sugars daily    • Disease of thyroid gland      po meds daily for hypothyroidism    • History of fracture as a child     rt leg- severe    • Hyperlipidemia    • Hypertension    • Hypothyroidism    • Peripheral neuropathy    • Peripheral vascular disease (HCC)     s/p angiogram 2/19-needs stent in left leg    • RBBB    • Right knee pain    • Vitamin D deficiency        Past Surgical History:   Procedure Laterality Date   • APPENDECTOMY     • CARDIAC CATHETERIZATION N/A 2/14/2019    Procedure: Left Heart Cath;  Surgeon: Cooper Apodaca MD;  Location: [facility] CATH INVASIVE LOCATION;  Service: Cardiology   • CARDIAC ELECTROPHYSIOLOGY PROCEDURE N/A 5/18/2022    Procedure: DEVICE IMPLANT;  Surgeon: Cooper Apodaca MD;  Location: [facility] CATH INVASIVE LOCATION;  Service: Cardiology;  Laterality: N/A;   • COLONOSCOPY     • CORONARY ARTERY BYPASS GRAFT N/A 3/22/2019    Procedure: MEDIAN STERNOTOMY, CORONARY ARTERY BYPASS GRAFT X3, UTILIZING THE LEFT INTERNAL MAMMARY ARTERY, EVH AND OPEN HARVEST OF THE RIGHT GREATER SAPHENOUS VEIN, EXPLORATION OF THE LEFT LEG;  Surgeon: Ap Au MD;  Location:  [facility];  Service: Cardiothoracic   • EYE SURGERY Bilateral     cataracts    • INTERVENTIONAL RADIOLOGY PROCEDURE N/A 7/29/2021    Procedure: Abdominal Aortagram with Runoff;  Surgeon: Jermaine Hernandez MD;  Location: [facility] CATH INVASIVE LOCATION;  Service: Cardiovascular;  Laterality: N/A;   • KNEE ARTHROSCOPY      right x 2, left x 1   • LACERATION REPAIR      right leg   • LEG SURGERY      2 for fracture of rt leg    • TONSILLECTOMY      Adnoidectomy   • TRANS METATARSAL AMPUTATION Left 8/2/2022    Procedure: GREAT TOE AMPUTATION LEFT;  Surgeon: Gopal Márquez MD;  Location:  [facility];  Service: Vascular;  Laterality: Left;       Family History   Problem Relation Age of Onset   • Coronary artery disease Mother    • Diabetes Mother    • Cancer Father        Social History     Socioeconomic History   • Marital status:    • Number of children: 2    Tobacco Use   • Smoking status: Never Smoker   • Smokeless tobacco: Never Used   Substance and Sexual Activity   • Alcohol use: No   • Drug use: No   • Sexual activity: Defer       No Known Allergies      Medication:    Current Facility-Administered Medications:   •  acetaminophen (TYLENOL) tablet 650 mg, 650 mg, Oral, Q4H PRN, 650 mg at 08/06/22 2110 **OR** acetaminophen (TYLENOL) 160 MG/5ML solution 650 mg, 650 mg, Oral, Q4H PRN **OR** acetaminophen (TYLENOL) suppository 650 mg, 650 mg, Rectal, Q4H PRN, Nishant Allan, PA  •  amLODIPine (NORVASC) tablet 10 mg, 10 mg, Oral, Daily, Nishant Allan PA, 10 mg at 08/08/22 0813  •  aspirin chewable tablet 81 mg, 81 mg, Oral, Daily, Nishant Allan PA, 81 mg at 08/08/22 0812  •  bisacodyl (DULCOLAX) suppository 10 mg, 10 mg, Rectal, Daily PRN, Nishant Allan PA  •  cefepime (MAXIPIME) 1 g/100 mL 0.9% NS IVPB (mbp), 1 g, Intravenous, Q12H, Sage Braga MD, 1 g at 08/08/22 0521  •  cetirizine (zyrTEC) tablet 5 mg, 5 mg, Oral, Daily, Nishant Allan PA, 5 mg at 08/08/22 0812  •  DAPTOmycin (CUBICIN) 600 mg in sodium chloride 0.9 % 50 mL IVPB, 6 mg/kg, Intravenous, Q24H, Sage Braga MD, Last Rate: 100 mL/hr at 08/07/22 1641, 600 mg at 08/07/22 1641  •  dextrose (D50W) (25 g/50 mL) IV injection 25 g, 25 g, Intravenous, Q15 Min PRN, Nishant Allan PA  •  dextrose (GLUTOSE) oral gel 15 g, 15 g, Oral, Q15 Min PRN, Nishant Allan, PA  •  glucagon (human recombinant) (GLUCAGEN DIAGNOSTIC) injection 1 mg, 1 mg, Intramuscular, Q15 Min PRN, Nishant Allan PA  •  heparin (porcine) 5000 UNIT/ML injection 5,000 Units, 5,000 Units, Subcutaneous, Q8H, Nishant Allan PA, 5,000 Units at 08/08/22 0520  •  HYDROcodone-acetaminophen (NORCO) 5-325 MG per tablet 1 tablet, 1 tablet, Oral, Q4H PRN, Peter Braga, , 1 tablet at 08/07/22 0533  •  insulin detemir (LEVEMIR) injection 30 Units, 30 Units, Subcutaneous, Nightly, Melissa Nguyen PA-C, 30 Units at  08/07/22 2132  •  Insulin Lispro (humaLOG) injection 0-7 Units, 0-7 Units, Subcutaneous, TID AC, Melissa Nguyen PA-C, 3 Units at 08/08/22 0811  •  Insulin Lispro (humaLOG) injection 5 Units, 5 Units, Subcutaneous, TID With Meals, Melissa Nguyen PA-C, 5 Units at 08/08/22 0812  •  levothyroxine (SYNTHROID, LEVOTHROID) tablet 50 mcg, 50 mcg, Oral, Q AM, Nishant Allan PA, 50 mcg at 08/08/22 0520  •  metoprolol succinate XL (TOPROL-XL) 24 hr tablet 50 mg, 50 mg, Oral, Q24H, Nishant Allan PA, 50 mg at 08/08/22 0812  •  ondansetron (ZOFRAN) tablet 4 mg, 4 mg, Oral, Q6H PRN **OR** ondansetron (ZOFRAN) injection 4 mg, 4 mg, Intravenous, Q6H PRN, Nishant Allan PA  •  phenol (CHLORASEPTIC) 1.4 % liquid 1 spray, 1 spray, Mouth/Throat, Q2H PRN, Megan Shukla PA, 1 spray at 08/04/22 1610  •  sennosides-docusate (PERICOLACE) 8.6-50 MG per tablet 2 tablet, 2 tablet, Oral, BID PRN, Nishant Allan PA, 2 tablet at 08/06/22 2111  •  [COMPLETED] Insert peripheral IV, , , Once **AND** sodium chloride 0.9 % flush 10 mL, 10 mL, Intravenous, PRN, Nishant Allan PA  •  sodium chloride 0.9 % flush 10 mL, 10 mL, Intravenous, Q12H, Nishant Allan PA, 10 mL at 08/08/22 0813  •  sodium chloride 0.9 % flush 10 mL, 10 mL, Intravenous, PRN, Nishant Allan PA  •  vitamin D3 capsule 5,000 Units, 5,000 Units, Oral, Daily, Nishant Allan PA, 5,000 Units at 08/08/22 0812    Facility-Administered Medications Ordered in Other Encounters:   •  Chlorhexidine Gluconate Cloth 2 % pads 1 application, 1 application, Topical, Q12H PRN, Mohan Arauz, PA    Antibiotics:  Anti-Infectives (From admission, onward)    Ordered     Dose/Rate Route Frequency Start Stop    08/04/22 1550  cefepime (MAXIPIME) 1 g/100 mL 0.9% NS IVPB (mbp)        Ordering Provider: Sage Braga MD    1 g  over 4 Hours Intravenous Every 12 Hours 08/05/22 0600 08/12/22 0559    08/04/22 1550  DAPTOmycin (CUBICIN) 600 mg in sodium chloride 0.9  % 50 mL IVPB        Ordering Provider: Sage Braga MD    6 mg/kg × 98 kg  100 mL/hr over 30 Minutes Intravenous Every 24 Hours Scheduled 22 1730 22 1559    22 1550  cefepime (MAXIPIME) 2 g/100 mL 0.9% NS (mbp)        Ordering Provider: Sage Braga MD    2 g  200 mL/hr over 30 Minutes Intravenous Once 22 1645 22 1640    22 1111  vancomycin 1500 mg/500 mL 0.9% NS IVPB ([facility])        Ordering Provider: Nishant Allan PA    15 mg/kg × 98 kg Intravenous Once 22 2200 22 2130    22 1209  vancomycin in dextrose 5% 150 mL (VANCOCIN) IVPB 750 mg        Ordering Provider: Miguel Bray RPH    750 mg Intravenous Every 12 Hours 22 2200 22 0934    22 1209  vancomycin in dextrose 5% 150 mL (VANCOCIN) IVPB 750 mg        Ordering Provider: Miguel Bray RPH    750 mg  over 60 Minutes Intravenous Every 12 Hours 22 1300 22 1422    22 0014  piperacillin-tazobactam (ZOSYN) 3.375 g in iso-osmotic dextrose 50 ml (premix)        Ordering Provider: Eliana Hightower APRN    3.375 g  over 4 Hours Intravenous Every 8 Hours 22 0800 22 0303    22 2208  piperacillin-tazobactam (ZOSYN) 3.375 g in iso-osmotic dextrose 50 ml (premix)        Ordering Provider: Donny Hightower APRN    3.375 g  over 30 Minutes Intravenous Once 22 2210 22 0320    22 2208  vancomycin 2000 mg/500 mL 0.9% NS IVPB ([facility])        Ordering Provider: Donny Hightower APRN    20 mg/kg × 98 kg Intravenous Once 22 2210 22 0040            Review of Systems:  See HPI      Physical Exam:   Vital Signs  Temp (24hrs), Av °F (36.7 °C), Min:97.8 °F (36.6 °C), Max:98.2 °F (36.8 °C)    Temp  Min: 97.8 °F (36.6 °C)  Max: 98.2 °F (36.8 °C)  BP  Min: 114/72  Max: 136/73  Pulse  Min: 63  Max: 75  Resp  Min: 16  Max: 18  SpO2  Min: 96 %  Max: 96 %    GENERAL: resting quietly  in no acute distress.   HEENT: Normocephalic,  atraumatic.  PERRL. EOMI. No conjunctival injection. No icterus. Oropharynx clear without evidence of thrush or exudate. No evidence of peridontal disease.    HEART: RRR; No murmur,  LUNGS: Clear to auscultation bilaterally   ABDOMEN: Soft, nontender,   :  Without Fonseca catheter.  MSK: Left foot wrapped  SKIN: Warm and dry without cutaneous eruptions on Inspection/palpation.        Laboratory Data    Results from last 7 days   Lab Units 08/06/22  1222 08/05/22  0618 08/04/22  0539   WBC 10*3/mm3 9.19 9.74 11.13*   HEMOGLOBIN g/dL 12.1* 12.1* 12.1*   HEMATOCRIT % 35.5* 35.3* 36.4*   PLATELETS 10*3/mm3 240 229 237     Results from last 7 days   Lab Units 08/08/22  0708   SODIUM mmol/L 137   POTASSIUM mmol/L 4.2   CHLORIDE mmol/L 106   CO2 mmol/L 20.0*   BUN mg/dL 35*   CREATININE mg/dL 1.70*   GLUCOSE mg/dL 243*   CALCIUM mg/dL 8.6                         Results from last 7 days   Lab Units 08/04/22  0539 08/03/22  0549   VANCOMYCIN RM mcg/mL 19.50 25.50     Estimated Creatinine Clearance: 50.4 mL/min (A) (by C-G formula based on SCr of 1.7 mg/dL (H)).      Microbiology:  No results found for: ACANTHNAEG, AFBCX, BPERTUSSISCX, BLOODCX  No results found for: BCIDPCR, CXREFLEX, CSFCX, CULTURETIS  No results found for: CULTURES, HSVCX, URCX  No results found for: EYECULTURE, GCCX, HSVCULTURE, LABHSV  No results found for: LEGIONELLA, MRSACX, MUMPSCX, MYCOPLASCX  No results found for: NOCARDIACX, STOOLCX  No results found for: THROATCX, UNSTIMCULT, URINECX, CULTURE, VZVCULTUR  No results found for: VIRALCULTU, WOUNDCX        Radiology:  Imaging Results (Last 72 Hours)     ** No results found for the last 72 hours. **        Estimated Creatinine Clearance: 50.4 mL/min (A) (by C-G formula based on SCr of 1.7 mg/dL (H)).      Impression:   Leukocytosis with neutrophilia, improved   Lactic acidosis  Left great toe wound- osteomyelitis by bone scan- amputation recommended by Dr. Márquez   Status post fall  Diabetes mellitus  type 2  Left shoulder dislocation  Probable peripheral vascular disease  Acute renal failure    PLAN/RECOMMENDATIONS:         D/c daptomycin  D/c cefepime  Start unasyn 3 g iv q8h    Cont iv abx x 6 weeks form 8/2/22  Patient has improvement    Can go to carol ann Braga MD  8/8/2022  14:43 EDT

## 2022-08-08 NOTE — PLAN OF CARE
Goal Outcome Evaluation:  Plan of Care Reviewed With: patient is nonweightbearing. Therapies working with pt.uses urinal. Sits up in the chair. R/A.        Progress: no change

## 2022-08-08 NOTE — PLAN OF CARE
Goal Outcome Evaluation:  Plan of Care Reviewed With: patient        Progress: no change  Outcome Evaluation: Pt deferred OOB/EOB this session. Pt gave good effort w/ LUE ther ex completing scap retractions, elbow, wrist and hand AROM. Pt re-educated to avoid ER and ABDuction of the LUE. Continue to progress as able current POC. Continue to recommend IPR at d/c.

## 2022-08-08 NOTE — PROGRESS NOTES
[facility] Medicine Services  PROGRESS NOTE    Patient Name: Jermaine Singh  : 1945  MRN: [id number]    Date of Admission: 2022  Primary Care Physician: Farooq Painter MD    Subjective   Subjective     CC:  F/u osteomyelitis    HPI:  Patient resting in bed. No complaints. Just ready to get out of the hospital.    ROS:  Gen- No fevers, chills  CV- No chest pain, palpitations  Resp- No cough, dyspnea  GI- No N/V/D, abd pain    Objective   Objective     Vital Signs:   Temp:  [97.8 °F (36.6 °C)-98.2 °F (36.8 °C)] 97.9 °F (36.6 °C)  Heart Rate:  [63-75] 67  Resp:  [16-18] 18  BP: (114-136)/(72-84) 129/81     Physical Exam:  Constitutional: No acute distress, awake, alert  HENT: NCAT, mucous membranes moist  Respiratory: Clear to auscultation bilaterally, respiratory effort normal   Cardiovascular: RRR, no murmurs, rubs, or gallops  Gastrointestinal: Positive bowel sounds, soft, nontender, nondistended  Musculoskeletal: No bilateral ankle edema, LLE in post-op bandage  Psychiatric: Appropriate affect, cooperative  Neurologic: Oriented x 3, strength symmetric in all extremities, Cranial Nerves grossly intact to confrontation, speech clear  Skin: No rashes    Exam unchanged from       Results Reviewed:  LAB RESULTS:      Lab 22  1222 22  0618 22  0539 22  0549   WBC 9.19 9.74 11.13* 12.97*   HEMOGLOBIN 12.1* 12.1* 12.1* 11.7*   HEMATOCRIT 35.5* 35.3* 36.4* 35.0*   PLATELETS 240 229 237 271   NEUTROS ABS  --   --   --  10.89*   IMMATURE GRANS (ABS)  --   --   --  0.06*   LYMPHS ABS  --   --   --  0.99   MONOS ABS  --   --   --  0.94*   EOS ABS  --   --   --  0.01   MCV 87.2 86.9 88.6 86.6         Lab 22  0708 22  1532 22  1222 22  0618 22  0539   SODIUM 137 139 142 140 140   POTASSIUM 4.2 4.3 4.3 4.4 4.2   CHLORIDE 106 110* 110* 107 105   CO2 20.0* 19.0* 24.0 21.0* 25.0   ANION GAP 11.0 10.0 8.0 12.0 10.0   BUN 35* 37* 35* 38*  32*   CREATININE 1.70* 1.81* 2.01* 2.35* 2.08*   EGFR 41.0* 38.0* 33.5* 27.8* 32.2*   GLUCOSE 243* 131* 128* 214* 165*   CALCIUM 8.6 8.5* 8.3* 8.1* 8.3*                 Lab 08/04/22  0539   CHOLESTEROL 180   LDL CHOL 113*   HDL CHOL 39*   TRIGLYCERIDES 160*             Brief Urine Lab Results  (Last result in the past 365 days)      Color   Clarity   Blood   Leuk Est   Nitrite   Protein   CREAT   Urine HCG        02/27/22 1321 Dark Yellow   Clear   Negative   Negative   Negative   2+                 Microbiology Results Abnormal     Procedure Component Value - Date/Time    Anaerobic Culture - Tissue, Toe, Left [[id number]] Collected: 08/02/22 1027    Lab Status: Final result Specimen: Tissue from Toe, Left Updated: 08/07/22 0858     Anaerobic Culture No anaerobes isolated at 5 days    Blood Culture - Blood, Hand, Left [[id number]]  (Normal) Collected: 07/27/22 2040    Lab Status: Final result Specimen: Blood from Hand, Left Updated: 08/01/22 2132     Blood Culture No growth at 5 days    Blood Culture - Blood, Hand, Right [[id number]]  (Normal) Collected: 07/27/22 2030    Lab Status: Final result Specimen: Blood from Hand, Right Updated: 08/01/22 2132     Blood Culture No growth at 5 days    COVID PRE-OP / PRE-PROCEDURE SCREENING ORDER (NO ISOLATION) - Swab, Nasopharynx [[id number]]  (Normal) Collected: 07/27/22 2246    Lab Status: Final result Specimen: Swab from Nasopharynx Updated: 07/27/22 2357    Narrative:      The following orders were created for panel order COVID PRE-OP / PRE-PROCEDURE SCREENING ORDER (NO ISOLATION) - Swab, Nasopharynx.  Procedure                               Abnormality         Status                     ---------                               -----------         ------                     COVID-19 and FLU A/B PCR...[[id number]]  Normal              Final result                 Please view results for these tests on the individual orders.    COVID-19 and FLU A/B PCR - Swab, Nasopharynx  [[id number]]  (Normal) Collected: 07/27/22 2246    Lab Status: Final result Specimen: Swab from Nasopharynx Updated: 07/27/22 6667     COVID19 Not Detected     Influenza A PCR Not Detected     Influenza B PCR Not Detected    Narrative:      Fact sheet for providers: https://www.fda.gov/media/703741/download    Fact sheet for patients: https://www.fda.gov/media/835935/download    Test performed by PCR.          No radiology results from the last 24 hrs        I have reviewed the medications:  Scheduled Meds:amLODIPine, 10 mg, Oral, Daily  aspirin, 81 mg, Oral, Daily  cefepime, 1 g, Intravenous, Q12H  cetirizine, 5 mg, Oral, Daily  DAPTOmycin, 6 mg/kg, Intravenous, Q24H  heparin (porcine), 5,000 Units, Subcutaneous, Q8H  insulin detemir, 30 Units, Subcutaneous, Nightly  insulin lispro, 0-7 Units, Subcutaneous, TID AC  Insulin Lispro, 5 Units, Subcutaneous, TID With Meals  levothyroxine, 50 mcg, Oral, Q AM  metoprolol succinate XL, 50 mg, Oral, Q24H  sodium chloride, 10 mL, Intravenous, Q12H  vitamin D3, 5,000 Units, Oral, Daily      Continuous Infusions:   PRN Meds:.•  acetaminophen **OR** acetaminophen **OR** acetaminophen  •  bisacodyl  •  dextrose  •  dextrose  •  glucagon (human recombinant)  •  HYDROcodone-acetaminophen  •  ondansetron **OR** ondansetron  •  phenol  •  sennosides-docusate  •  [COMPLETED] Insert peripheral IV **AND** sodium chloride  •  sodium chloride    Assessment & Plan   Assessment & Plan     Active Hospital Problems    Diagnosis  POA   • Proximal phalanx fracture of the second digit extending into the second metatarsal joint [S92.919A]  Unknown   • Dislocation of left shoulder joint, initial encounter [S43.005A]  Yes   • Cellulitis in diabetic foot (HCC) [E11.628, L03.119]  Yes   • Fall [W19.XXXA]  Yes   • Sepsis (HCC) [A41.9]  Yes   • Leukocytosis [D72.829]  Yes   • Lactic acidosis [E87.2]  Yes   • Elevated C-reactive protein (CRP) [R79.82]  Yes   • Elevated sed rate (elev SR) [R70.0]  Yes    • SSS (sick sinus syndrome) (HCC) [I49.5]  Yes   • Hyperlipidemia [E78.5]  Yes   • Hypertension [I10]  Yes   • S/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 Au [Z95.1]  Not Applicable   • Hypothyroidism (acquired) [E03.9]  Yes   • Type 2 diabetes mellitus (HCC) [E11.9]  Yes      Resolved Hospital Problems   No resolved problems to display.        Brief Hospital Course to date:  Jermaine Singh is a 77 y.o. male with PMH significant for HTN, HLD, CAD s/p CABG, PVD s/p RLE stent, CKD III, insulin-dependent DMII and hypothyroidism. He was admitted to [facility] 7/27/22 for L shoulder dislocation after a fall as well as sepsis secondary to L great toe osteomyelitis.     L great toe cellulitis / osteomyelitis  Peripheral vascular disease   - Followed by Dr. Joseph of podiatry - was on PO Doxycycline outpatient  - Bone scan concerning for osteomyelitis of L great toe   - s/p L great toe amputation by Dr. Márquez 8/2/22   - Appreciate cardiology evaluation - arterial duplex obtained. Cardiology does not recommend revascularization at this time   - ID following -  cefepime/Dapto     Fall   Anterior L shoulder dislocation  Acute fracture of the  proximal phalanx of the second digit extending into the second metatarsophalangeal joint  - s/p reduction in the ED  - Appreciate ortho (Dr. Gipson) assistance   - Recommend non-operative treatment. Sling for comfort.   - PT for range of motion exercises, avoid Abduction and external rotation. WBAT on LUE with walker or knee scooter  - Follow up with Dr. Gipson in 2-3 weeks      Insulin-dependent DMII  - Hgb A1c 9.0%  - Continue Levemir 30 units QHS and Lispro to 5 units TID AC + SSI      [place] - worsening  - Baseline creatinine appears to be 0.9-1.2  - improving, 1.7 this AM  - Cardiology has stopped ACE, monitor     Hypertension  Coronary artery disease s/p CABG  SSS s/p PPM   - Cardiology has stopped ACEI and added Amlodipine 10mg daily   - Continue Metoprolol XL 50mg daily    -scheduled for 12/28/2022 1:30 pm with device check and should keep that appt.     Hypothyroid  - Continue levothyroxine    Expected Discharge Location and Transportation: rehab  Expected Discharge Date: 8/9    DVT prophylaxis:  Medical and mechanical DVT prophylaxis orders are present.     AM-PAC 6 Clicks Score (PT): 11 (08/04/22 0863)    CODE STATUS:   Code Status and Medical Interventions:   Ordered at: 07/28/22 0000     Code Status (Patient has no pulse and is not breathing):    CPR (Attempt to Resuscitate)     Medical Interventions (Patient has pulse or is breathing):    Full Support       Rajani Farr,   08/08/22

## 2022-08-09 ENCOUNTER — APPOINTMENT (OUTPATIENT)
Dept: GENERAL RADIOLOGY | Facility: HOSPITAL | Age: 77
End: 2022-08-09

## 2022-08-09 LAB
ANION GAP SERPL CALCULATED.3IONS-SCNC: 12 MMOL/L (ref 5–15)
BUN SERPL-MCNC: 39 MG/DL (ref 8–23)
BUN/CREAT SERPL: 21.5 (ref 7–25)
CALCIUM SPEC-SCNC: 9.2 MG/DL (ref 8.6–10.5)
CHLORIDE SERPL-SCNC: 111 MMOL/L (ref 98–107)
CO2 SERPL-SCNC: 22 MMOL/L (ref 22–29)
CREAT SERPL-MCNC: 1.81 MG/DL (ref 0.76–1.27)
EGFRCR SERPLBLD CKD-EPI 2021: 38 ML/MIN/1.73
GLUCOSE BLDC GLUCOMTR-MCNC: 101 MG/DL (ref 70–130)
GLUCOSE BLDC GLUCOMTR-MCNC: 112 MG/DL (ref 70–130)
GLUCOSE BLDC GLUCOMTR-MCNC: 124 MG/DL (ref 70–130)
GLUCOSE BLDC GLUCOMTR-MCNC: 95 MG/DL (ref 70–130)
GLUCOSE SERPL-MCNC: 130 MG/DL (ref 65–99)
POTASSIUM SERPL-SCNC: 4.2 MMOL/L (ref 3.5–5.2)
SODIUM SERPL-SCNC: 145 MMOL/L (ref 136–145)

## 2022-08-09 PROCEDURE — 80048 BASIC METABOLIC PNL TOTAL CA: CPT | Performed by: INTERNAL MEDICINE

## 2022-08-09 PROCEDURE — 25010000002 PROCHLORPERAZINE 10 MG/2ML SOLUTION: Performed by: INTERNAL MEDICINE

## 2022-08-09 PROCEDURE — 25010000002 HEPARIN (PORCINE) PER 1000 UNITS: Performed by: PHYSICIAN ASSISTANT

## 2022-08-09 PROCEDURE — 99232 SBSQ HOSP IP/OBS MODERATE 35: CPT | Performed by: INTERNAL MEDICINE

## 2022-08-09 PROCEDURE — 82962 GLUCOSE BLOOD TEST: CPT

## 2022-08-09 PROCEDURE — 25010000002 ONDANSETRON PER 1 MG: Performed by: PHYSICIAN ASSISTANT

## 2022-08-09 PROCEDURE — 63710000001 INSULIN DETEMIR PER 5 UNITS: Performed by: PHYSICIAN ASSISTANT

## 2022-08-09 PROCEDURE — 25010000002 AMPICILLIN-SULBACTAM PER 1.5 G: Performed by: INTERNAL MEDICINE

## 2022-08-09 PROCEDURE — 99024 POSTOP FOLLOW-UP VISIT: CPT | Performed by: THORACIC SURGERY (CARDIOTHORACIC VASCULAR SURGERY)

## 2022-08-09 PROCEDURE — 63710000001 INSULIN LISPRO (HUMAN) PER 5 UNITS: Performed by: PHYSICIAN ASSISTANT

## 2022-08-09 PROCEDURE — 74018 RADEX ABDOMEN 1 VIEW: CPT

## 2022-08-09 RX ORDER — POLYETHYLENE GLYCOL 3350 17 G/17G
17 POWDER, FOR SOLUTION ORAL DAILY
Status: DISCONTINUED | OUTPATIENT
Start: 2022-08-09 | End: 2022-08-26 | Stop reason: HOSPADM

## 2022-08-09 RX ORDER — SODIUM CHLORIDE 9 MG/ML
75 INJECTION, SOLUTION INTRAVENOUS CONTINUOUS
Status: ACTIVE | OUTPATIENT
Start: 2022-08-09 | End: 2022-08-09

## 2022-08-09 RX ORDER — PROCHLORPERAZINE EDISYLATE 5 MG/ML
5 INJECTION INTRAMUSCULAR; INTRAVENOUS EVERY 6 HOURS PRN
Status: DISCONTINUED | OUTPATIENT
Start: 2022-08-09 | End: 2022-08-26 | Stop reason: HOSPADM

## 2022-08-09 RX ORDER — AMLODIPINE BESYLATE 10 MG/1
10 TABLET ORAL DAILY
Qty: 30 TABLET | Refills: 11 | Status: ON HOLD | OUTPATIENT
Start: 2022-08-09 | End: 2022-10-03

## 2022-08-09 RX ORDER — METOPROLOL SUCCINATE 50 MG/1
50 TABLET, EXTENDED RELEASE ORAL
Qty: 30 TABLET | Refills: 11 | Status: SHIPPED | OUTPATIENT
Start: 2022-08-09 | End: 2022-08-26 | Stop reason: HOSPADM

## 2022-08-09 RX ADMIN — HYDROCODONE BITARTRATE AND ACETAMINOPHEN 1 TABLET: 5; 325 TABLET ORAL at 13:26

## 2022-08-09 RX ADMIN — POLYETHYLENE GLYCOL 3350 17 G: 17 POWDER, FOR SOLUTION ORAL at 14:02

## 2022-08-09 RX ADMIN — INSULIN LISPRO 5 UNITS: 100 INJECTION, SOLUTION INTRAVENOUS; SUBCUTANEOUS at 08:44

## 2022-08-09 RX ADMIN — INSULIN DETEMIR 30 UNITS: 100 INJECTION, SOLUTION SUBCUTANEOUS at 21:17

## 2022-08-09 RX ADMIN — PROCHLORPERAZINE EDISYLATE 5 MG: 5 INJECTION INTRAMUSCULAR; INTRAVENOUS at 14:02

## 2022-08-09 RX ADMIN — ASPIRIN 81 MG CHEWABLE TABLET 81 MG: 81 TABLET CHEWABLE at 08:44

## 2022-08-09 RX ADMIN — SODIUM CHLORIDE 75 ML/HR: 9 INJECTION, SOLUTION INTRAVENOUS at 13:19

## 2022-08-09 RX ADMIN — LEVOTHYROXINE SODIUM 50 MCG: 50 TABLET ORAL at 06:10

## 2022-08-09 RX ADMIN — HEPARIN SODIUM 5000 UNITS: 5000 INJECTION INTRAVENOUS; SUBCUTANEOUS at 06:10

## 2022-08-09 RX ADMIN — SENNOSIDES AND DOCUSATE SODIUM 2 TABLET: 50; 8.6 TABLET ORAL at 14:02

## 2022-08-09 RX ADMIN — Medication 5000 UNITS: at 08:44

## 2022-08-09 RX ADMIN — BISACODYL 10 MG: 10 SUPPOSITORY RECTAL at 09:13

## 2022-08-09 RX ADMIN — Medication 10 ML: at 21:18

## 2022-08-09 RX ADMIN — SODIUM CHLORIDE 3 G: 900 INJECTION INTRAVENOUS at 16:03

## 2022-08-09 RX ADMIN — HEPARIN SODIUM 5000 UNITS: 5000 INJECTION INTRAVENOUS; SUBCUTANEOUS at 21:18

## 2022-08-09 RX ADMIN — SODIUM CHLORIDE 3 G: 900 INJECTION INTRAVENOUS at 08:46

## 2022-08-09 RX ADMIN — ONDANSETRON 4 MG: 2 INJECTION INTRAMUSCULAR; INTRAVENOUS at 17:50

## 2022-08-09 RX ADMIN — AMLODIPINE BESYLATE 10 MG: 10 TABLET ORAL at 08:44

## 2022-08-09 RX ADMIN — INSULIN LISPRO 5 UNITS: 100 INJECTION, SOLUTION INTRAVENOUS; SUBCUTANEOUS at 17:28

## 2022-08-09 RX ADMIN — HEPARIN SODIUM 5000 UNITS: 5000 INJECTION INTRAVENOUS; SUBCUTANEOUS at 13:26

## 2022-08-09 RX ADMIN — CETIRIZINE HYDROCHLORIDE 5 MG: 10 TABLET, FILM COATED ORAL at 08:44

## 2022-08-09 RX ADMIN — METOPROLOL SUCCINATE 50 MG: 50 TABLET, EXTENDED RELEASE ORAL at 08:44

## 2022-08-09 RX ADMIN — ONDANSETRON 4 MG: 2 INJECTION INTRAMUSCULAR; INTRAVENOUS at 08:59

## 2022-08-09 NOTE — PROGRESS NOTES
Jermaien JIMENES Francisco  1945  [id number]    Date of Consult: 8/9/2022    Admission Date: 7/27/2022      Requesting Provider: No ref. provider found  Evaluating Physician: Sage Braga MD    Reason for Consultation: Evaluation of toe wound    History of present illness:    Patient is a 77 y.o. male with coronary disease history of CABG diabetes mellitus type 2 hypothyroidism hypertension hyperlipidemia prevascular disease presents the emergency room after having a fall and injuring left shoulder patient tripped while walking his dog.  Coincidentally patient wears an orthotic shoe as recommended by a podiatrist has been on oral doxycycline for a left toe wound we are asked to evaluate this patient to start on broad-spectrum antibiotics occluding vancomycin and Zosyn.  X-ray of left foot showed soft tissue swelling in the forefoot without obvious fracture or osteomyelitis.    Has history of pacemaker    History of vascular stent placed in right leg.    7/29/2022; is having bone scan today denies fevers rash sore throat or diarrhea    7/30/22:  Bone scan compatible with osteomyelitis distal phalanx of first digit, acute fracture 2nd digit.  Afebrile. Blood cultures no growth to date.     7/31/22:  Afebrile.   Dr. Márquez consulting Dr. Apodaca for vascular studies left lower extremity.   No n/v/d.  Complains of shoulder pain.     8/2/22; doing well; had surgery today; toe amputation, no fever, rash, sore throat    8/3/22; no events overnight; resting quietly no events overnight    8/4/22; doing well; no events overnight; no fever, rash, sore throat;   8/5/22; no events overnight; no fever, rash, sore throat, no diarrhea    8/8/22; no events overnight; no fever, rash, sore throat    8/9/22; no events overnight; no fever, rash, sore throat no diarrhea  Past Medical History:   Diagnosis Date   • Asthma    • Coronary artery disease    • Diabetes mellitus (HCC) 2000    started on inuslin 12/2018; started on po meds  in 2000; checking blood sugars daily    • Disease of thyroid gland     po meds daily for hypothyroidism    • History of fracture as a child     rt leg- severe    • Hyperlipidemia    • Hypertension    • Hypothyroidism    • Peripheral neuropathy    • Peripheral vascular disease (HCC)     s/p angiogram 2/19-needs stent in left leg    • RBBB    • Right knee pain    • Vitamin D deficiency        Past Surgical History:   Procedure Laterality Date   • APPENDECTOMY     • CARDIAC CATHETERIZATION N/A 2/14/2019    Procedure: Left Heart Cath;  Surgeon: Cooper Apodaca MD;  Location: [facility] CATH INVASIVE LOCATION;  Service: Cardiology   • CARDIAC ELECTROPHYSIOLOGY PROCEDURE N/A 5/18/2022    Procedure: DEVICE IMPLANT;  Surgeon: Cooper Apodaca MD;  Location: [facility] CATH INVASIVE LOCATION;  Service: Cardiology;  Laterality: N/A;   • COLONOSCOPY     • CORONARY ARTERY BYPASS GRAFT N/A 3/22/2019    Procedure: MEDIAN STERNOTOMY, CORONARY ARTERY BYPASS GRAFT X3, UTILIZING THE LEFT INTERNAL MAMMARY ARTERY, EVH AND OPEN HARVEST OF THE RIGHT GREATER SAPHENOUS VEIN, EXPLORATION OF THE LEFT LEG;  Surgeon: Ap Au MD;  Location: [facility] OR;  Service: Cardiothoracic   • EYE SURGERY Bilateral     cataracts    • INTERVENTIONAL RADIOLOGY PROCEDURE N/A 7/29/2021    Procedure: Abdominal Aortagram with Runoff;  Surgeon: Jermaine Hernandez MD;  Location:  [facility] C[facility] INVASIVE LOCATION;  Service: Cardiovascular;  Laterality: N/A;   • KNEE ARTHROSCOPY      right x 2, left x 1   • LACERATION REPAIR      right leg   • LEG SURGERY      2 for fracture of rt leg    • TONSILLECTOMY      Adnoidectomy   • TRANS METATARSAL AMPUTATION Left 8/2/2022    Procedure: GREAT TOE AMPUTATION LEFT;  Surgeon: Gopal Márquez MD;  Location: [facility] OR;  Service: Vascular;  Laterality: Left;       Family History   Problem Relation Age of Onset   • Coronary artery disease Mother    • Diabetes Mother    • Cancer Father        Social History      Socioeconomic History   • Marital status:    • Number of children: 2   Tobacco Use   • Smoking status: Never Smoker   • Smokeless tobacco: Never Used   Substance and Sexual Activity   • Alcohol use: No   • Drug use: No   • Sexual activity: Defer       No Known Allergies      Medication:    Current Facility-Administered Medications:   •  acetaminophen (TYLENOL) tablet 650 mg, 650 mg, Oral, Q4H PRN, 650 mg at 08/06/22 2110 **OR** acetaminophen (TYLENOL) 160 MG/5ML solution 650 mg, 650 mg, Oral, Q4H PRN **OR** acetaminophen (TYLENOL) suppository 650 mg, 650 mg, Rectal, Q4H PRN, Nishant Allan PA  •  amLODIPine (NORVASC) tablet 10 mg, 10 mg, Oral, Daily, Nishant Allan PA, 10 mg at 08/09/22 0844  •  ampicillin-sulbactam 3 g/100 mL 0.9% NS IVPB, 3 g, Intravenous, Q8H, Sage Braga MD, 3 g at 08/09/22 1603  •  aspirin chewable tablet 81 mg, 81 mg, Oral, Daily, Nishant Allan PA, 81 mg at 08/09/22 0844  •  bisacodyl (DULCOLAX) suppository 10 mg, 10 mg, Rectal, Daily PRN, Nishant Allan PA, 10 mg at 08/09/22 0913  •  cetirizine (zyrTEC) tablet 5 mg, 5 mg, Oral, Daily, Nishant Allan PA, 5 mg at 08/09/22 0844  •  dextrose (D50W) (25 g/50 mL) IV injection 25 g, 25 g, Intravenous, Q15 Min PRN, Nishant Allan PA  •  dextrose (GLUTOSE) oral gel 15 g, 15 g, Oral, Q15 Min PRN, Nishant Allan PA  •  glucagon (human recombinant) (GLUCAGEN DIAGNOSTIC) injection 1 mg, 1 mg, Intramuscular, Q15 Min PRN, Nishant Allan PA  •  heparin (porcine) 5000 UNIT/ML injection 5,000 Units, 5,000 Units, Subcutaneous, Q8H, Nishant Allan PA, 5,000 Units at 08/09/22 1326  •  HYDROcodone-acetaminophen (NORCO) 5-325 MG per tablet 1 tablet, 1 tablet, Oral, Q4H PRN, Peter Braga, , 1 tablet at 08/09/22 1326  •  insulin detemir (LEVEMIR) injection 30 Units, 30 Units, Subcutaneous, Nightly, Melissa Nguyen PA-C, 30 Units at 08/08/22 2138  •  Insulin Lispro (humaLOG) injection 0-7 Units, 0-7 Units,  Subcutaneous, TID AC, Melissa Nguyen PA-C, 2 Units at 08/08/22 1722  •  Insulin Lispro (humaLOG) injection 5 Units, 5 Units, Subcutaneous, TID With Meals, Melissa Nguyen PA-C, 5 Units at 08/09/22 0844  •  levothyroxine (SYNTHROID, LEVOTHROID) tablet 50 mcg, 50 mcg, Oral, Q AM, Nishant Allan PA, 50 mcg at 08/09/22 0610  •  metoprolol succinate XL (TOPROL-XL) 24 hr tablet 50 mg, 50 mg, Oral, Q24H, Nishant Allan PA, 50 mg at 08/09/22 0844  •  ondansetron (ZOFRAN) tablet 4 mg, 4 mg, Oral, Q6H PRN **OR** ondansetron (ZOFRAN) injection 4 mg, 4 mg, Intravenous, Q6H PRN, Nishant Allan PA, 4 mg at 08/09/22 0859  •  phenol (CHLORASEPTIC) 1.4 % liquid 1 spray, 1 spray, Mouth/Throat, Q2H PRN, Megan Shukla PA, 1 spray at 08/04/22 1610  •  polyethylene glycol (MIRALAX) packet 17 g, 17 g, Oral, Daily, Rajani Farr DO, 17 g at 08/09/22 1402  •  prochlorperazine (COMPAZINE) injection 5 mg, 5 mg, Intravenous, Q6H PRN, Rajani Farr DO, 5 mg at 08/09/22 1402  •  sennosides-docusate (PERICOLACE) 8.6-50 MG per tablet 2 tablet, 2 tablet, Oral, BID PRN, Nishant Allan PA, 2 tablet at 08/09/22 1402  •  [COMPLETED] Insert peripheral IV, , , Once **AND** sodium chloride 0.9 % flush 10 mL, 10 mL, Intravenous, PRN, Nishant Allan PA  •  sodium chloride 0.9 % flush 10 mL, 10 mL, Intravenous, Q12H, Nishant Allan PA, 10 mL at 08/08/22 2139  •  sodium chloride 0.9 % flush 10 mL, 10 mL, Intravenous, PRN, Nishant Allan PA  •  sodium chloride 0.9 % infusion, 75 mL/hr, Intravenous, Continuous, Rajani Farr, DO, Last Rate: 75 mL/hr at 08/09/22 1319, 75 mL/hr at 08/09/22 1319  •  vitamin D3 capsule 5,000 Units, 5,000 Units, Oral, Daily, Nishant Allan PA, 5,000 Units at 08/09/22 0844    Facility-Administered Medications Ordered in Other Encounters:   •  Chlorhexidine Gluconate Cloth 2 % pads 1 application, 1 application, Topical, Q12H PRN, Mohna Arauz PA    Antibiotics:  Anti-Infectives  (From admission, onward)    Ordered     Dose/Rate Route Frequency Start Stop    22 1445  ampicillin-sulbactam 3 g/100 mL 0.9% NS IVPB        Ordering Provider: Sage Braga MD    3 g Intravenous Every 8 Hours 22 1600 08/15/22 1559    22 1550  cefepime (MAXIPIME) 2 g/100 mL 0.9% NS (mbp)        Ordering Provider: Sage Braga MD    2 g  200 mL/hr over 30 Minutes Intravenous Once 22 1645 22 1640    22 1111  vancomycin 1500 mg/500 mL 0.9% NS IVPB ([facility])        Ordering Provider: Nishant Allan PA    15 mg/kg × 98 kg Intravenous Once 22 2200 22 2130    22 1209  vancomycin in dextrose 5% 150 mL (VANCOCIN) IVPB 750 mg        Ordering Provider: Miguel Bray RPH    750 mg Intravenous Every 12 Hours 22 2200 22 0934    22 1209  vancomycin in dextrose 5% 150 mL (VANCOCIN) IVPB 750 mg        Ordering Provider: Miguel Bray RPH    750 mg  over 60 Minutes Intravenous Every 12 Hours 22 1300 22 1422    22 0014  piperacillin-tazobactam (ZOSYN) 3.375 g in iso-osmotic dextrose 50 ml (premix)        Ordering Provider: Eliana Hightower APRN    3.375 g  over 4 Hours Intravenous Every 8 Hours 22 0800 22 0303    22 2208  piperacillin-tazobactam (ZOSYN) 3.375 g in iso-osmotic dextrose 50 ml (premix)        Ordering Provider: Donny Hightower APRN    3.375 g  over 30 Minutes Intravenous Once 22 2210 22 0320    22 2208  vancomycin 2000 mg/500 mL 0.9% NS IVPB ([facility])        Ordering Provider: Donny Hightower APRN    20 mg/kg × 98 kg Intravenous Once 22 2210 22 0040            Review of Systems:  See HPI      Physical Exam:   Vital Signs  Temp (24hrs), Av.5 °F (36.9 °C), Min:97.8 °F (36.6 °C), Max:98.9 °F (37.2 °C)    Temp  Min: 97.8 °F (36.6 °C)  Max: 98.9 °F (37.2 °C)  BP  Min: 145/80  Max: 168/94  Pulse  Min: 70  Max: 80  Resp  Min: 16  Max: 18  SpO2  Min: 90 %  Max: 94  %    GENERAL: resting quietly  in no acute distress.   HEENT: Normocephalic, atraumatic.  PERRL. EOMI. No conjunctival injection. No icterus.   HEART: RRR; No murmur,  LUNGS: Clear to auscultation bilaterally   ABDOMEN: Soft, nontender,   :  Without Fonseca catheter.  MSK: Left foot wrapped  SKIN: Warm and dry without cutaneous eruptions on Inspection/palpation.        Laboratory Data    Results from last 7 days   Lab Units 08/06/22  1222 08/05/22  0618 08/04/22  0539   WBC 10*3/mm3 9.19 9.74 11.13*   HEMOGLOBIN g/dL 12.1* 12.1* 12.1*   HEMATOCRIT % 35.5* 35.3* 36.4*   PLATELETS 10*3/mm3 240 229 237     Results from last 7 days   Lab Units 08/09/22  0913   SODIUM mmol/L 145   POTASSIUM mmol/L 4.2   CHLORIDE mmol/L 111*   CO2 mmol/L 22.0   BUN mg/dL 39*   CREATININE mg/dL 1.81*   GLUCOSE mg/dL 130*   CALCIUM mg/dL 9.2                         Results from last 7 days   Lab Units 08/04/22  0539 08/03/22  0549   VANCOMYCIN RM mcg/mL 19.50 25.50     Estimated Creatinine Clearance: 47.4 mL/min (A) (by C-G formula based on SCr of 1.81 mg/dL (H)).      Microbiology:  No results found for: ACANTHNAEG, AFBCX, BPERTUSSISCX, BLOODCX  No results found for: BCIDPCR, CXREFLEX, CSFCX, CULTURETIS  No results found for: CULTURES, HSVCX, URCX  No results found for: EYECULTURE, GCCX, HSVCULTURE, LABHSV  No results found for: LEGIONELLA, MRSACX, MUMPSCX, MYCOPLASCX  No results found for: NOCARDIACX, STOOLCX  No results found for: THROATCX, UNSTIMCULT, URINECX, CULTURE, VZVCULTUR  No results found for: VIRALCULTU, WOUNDCX        Radiology:  Imaging Results (Last 72 Hours)     Procedure Component Value Units Date/Time    XR Abdomen KUB [[id number]] Collected: 08/09/22 1441     Updated: 08/09/22 1445    Narrative:      XR ABDOMEN KUB-     Date of Exam: 8/9/2022 2:25 PM     Indication: vomiting constipation; S43.005A-Unspecified dislocation of  left shoulder joint, initial encounter; L03.116-Cellulitis of left lower  limb; Z78.9-Other  specified health status.     Comparison Exams: None available.     Technique: Single AP radiograph of the abdomen     FINDINGS:  There is a nonobstructive bowel gas pattern. A large amount of stool is  present throughout the colon. No pathological calcifications are  identified. The osseous structures appear intact.       Impression:      No acute process identified. Large amount of stool throughout colon.     This report was finalized on 8/9/2022 2:42 PM by Jf Cadet MD.           Estimated Creatinine Clearance: 47.4 mL/min (A) (by C-G formula based on SCr of 1.81 mg/dL (H)).      Impression:   Leukocytosis with neutrophilia, improved   Lactic acidosis  Left great toe wound- osteomyelitis by bone scan- amputation recommended by Dr. Márquez   Status post fall  Diabetes mellitus type 2  Left shoulder dislocation  Probable peripheral vascular disease  Acute renal failure    PLAN/RECOMMENDATIONS:           cont unasyn 3 g iv q8h    Cont iv abx x 6 weeks form 8/2/22 (9/13/22)  Patient has improvement    Can go to [facility] soon    D/w hospitalist   picc tomorrow  Anticipate placement at [facility]  Sage Braga MD  8/9/2022  16:11 EDT

## 2022-08-09 NOTE — PLAN OF CARE
Problem: Fall Injury Risk  Goal: Absence of Fall and Fall-Related Injury  Outcome: Ongoing, Progressing   Goal Outcome Evaluation:

## 2022-08-09 NOTE — PLAN OF CARE
Goal Outcome Evaluation:  Plan of Care Reviewed With: patient        Progress: no change  Outcome Evaluation: VSS, RA, A&Ox4. N/V this shift, PRN zofran given. Pt had not had BM since 7/30, PRN bowel meds given. KUB completed this shift. Pt had BM this afternoon. PICC consult ordered, pt refused PICC when PICC nurse came, see note. CM following. Awaiting DC to [facility]. Continue to monitor.

## 2022-08-09 NOTE — PROGRESS NOTES
Cardiothoracic Surgery Progress Note      POD #: 7-left great toe transmetatarsal amputation primary closure     LOS: 13 days      Subjective: Awake and alert    Objective:  Vital Signs vital signs below noted T-max past 24 hours 98.6 °F  Temp:  [97.8 °F (36.6 °C)-98.6 °F (37 °C)] 98.5 °F (36.9 °C)  Heart Rate:  [67-79] 70  Resp:  [16-18] 18  BP: (127-181)/(80-94) 168/94    Physical Exam:   General Appearance: Oriented x3   Lungs:   Heart:   Skin:   Incision: Amputation site dressing change.  Sutures intact skin margins are viable and skin flaps are viable.     Results:  Results from last 7 days   Lab Units 08/06/22  1222   WBC 10*3/mm3 9.19   HEMOGLOBIN g/dL 12.1*   HEMATOCRIT % 35.5*   PLATELETS 10*3/mm3 240     Results from last 7 days   Lab Units 08/08/22  0708   SODIUM mmol/L 137   POTASSIUM mmol/L 4.2   CHLORIDE mmol/L 106   CO2 mmol/L 20.0*   BUN mg/dL 35*   CREATININE mg/dL 1.70*   GLUCOSE mg/dL 243*   CALCIUM mg/dL 8.6         Assessment: #1.  Postop day 7 left great toe transmetatarsal amputation primary closure healing well  2.  Status post remote coronary bypass grafting  3.  Status post pacemaker placement for sick sinus syndrome  4.  Recent fall on left shoulder dislocation reduced in the emergency department  5.  Postop endovascular stenting angioplasty of the right lower extremity per Dr. Billy Hernandez in 2019      Plan: Continue daily dressing changes amputation site.  Medical manage per hospitalist.  Antibiotics per infectious disease.      Gopal Márquez MD - 08/09/22 - 05:52 EDT

## 2022-08-09 NOTE — PROGRESS NOTES
[facility] Medicine Services  PROGRESS NOTE    Patient Name: Jermaine Singh  : 1945  MRN: [id number]    Date of Admission: 2022  Primary Care Physician: Farooq Painter MD    Subjective   Subjective     CC:  F/u osteomyelitis    HPI:  Patient resting in bed. Having issues with nausea this morning and vomited several times. Has not had a bowel movement, nursing just place suppository.    ROS:  Gen- No fevers, chills  CV- No chest pain, palpitations  Resp- No cough, dyspnea  GI- +nausea/vomiting, abd pain    Objective   Objective     Vital Signs:   Temp:  [97.8 °F (36.6 °C)-98.9 °F (37.2 °C)] 98.9 °F (37.2 °C)  Heart Rate:  [70-80] 73  Resp:  [16-18] 18  BP: (145-181)/(80-94) 159/87     Physical Exam:  Constitutional: No acute distress, awake, alert, in bed  HENT: NCAT, mucous membranes moist  Respiratory: Clear to auscultation bilaterally, respiratory effort normal   Cardiovascular: RRR, no murmurs, rubs, or gallops  Gastrointestinal: soft, nontender, nondistended  Musculoskeletal: No bilateral ankle edema, LLE in boot  Psychiatric: Appropriate affect, cooperative  Neurologic: Oriented x 3, strength symmetric in all extremities, Cranial Nerves grossly intact to confrontation, speech clear  Skin: No rashes        Results Reviewed:  LAB RESULTS:      Lab 22  1222 22  0618 22  0539 22  0549   WBC 9.19 9.74 11.13* 12.97*   HEMOGLOBIN 12.1* 12.1* 12.1* 11.7*   HEMATOCRIT 35.5* 35.3* 36.4* 35.0*   PLATELETS 240 229 237 271   NEUTROS ABS  --   --   --  10.89*   IMMATURE GRANS (ABS)  --   --   --  0.06*   LYMPHS ABS  --   --   --  0.99   MONOS ABS  --   --   --  0.94*   EOS ABS  --   --   --  0.01   MCV 87.2 86.9 88.6 86.6         Lab 22  0913 22  0708 22  1532 22  1222 22  0618   SODIUM 145 137 139 142 140   POTASSIUM 4.2 4.2 4.3 4.3 4.4   CHLORIDE 111* 106 110* 110* 107   CO2 22.0 20.0* 19.0* 24.0 21.0*   ANION GAP 12.0 11.0 10.0  8.0 12.0   BUN 39* 35* 37* 35* 38*   CREATININE 1.81* 1.70* 1.81* 2.01* 2.35*   EGFR 38.0* 41.0* 38.0* 33.5* 27.8*   GLUCOSE 130* 243* 131* 128* 214*   CALCIUM 9.2 8.6 8.5* 8.3* 8.1*                 Lab 08/04/22  0539   CHOLESTEROL 180   LDL CHOL 113*   HDL CHOL 39*   TRIGLYCERIDES 160*             Brief Urine Lab Results  (Last result in the past 365 days)      Color   Clarity   Blood   Leuk Est   Nitrite   Protein   CREAT   Urine HCG        02/27/22 1321 Dark Yellow   Clear   Negative   Negative   Negative   2+                 Microbiology Results Abnormal     Procedure Component Value - Date/Time    Anaerobic Culture - Tissue, Toe, Left [[id number]] Collected: 08/02/22 1027    Lab Status: Final result Specimen: Tissue from Toe, Left Updated: 08/07/22 0858     Anaerobic Culture No anaerobes isolated at 5 days    Blood Culture - Blood, Hand, Left [[id number]]  (Normal) Collected: 07/27/22 2040    Lab Status: Final result Specimen: Blood from Hand, Left Updated: 08/01/22 2132     Blood Culture No growth at 5 days    Blood Culture - Blood, Hand, Right [[id number]]  (Normal) Collected: 07/27/22 2030    Lab Status: Final result Specimen: Blood from Hand, Right Updated: 08/01/22 2132     Blood Culture No growth at 5 days    COVID PRE-OP / PRE-PROCEDURE SCREENING ORDER (NO ISOLATION) - Swab, Nasopharynx [[id number]]  (Normal) Collected: 07/27/22 2246    Lab Status: Final result Specimen: Swab from Nasopharynx Updated: 07/27/22 2357    Narrative:      The following orders were created for panel order COVID PRE-OP / PRE-PROCEDURE SCREENING ORDER (NO ISOLATION) - Swab, Nasopharynx.  Procedure                               Abnormality         Status                     ---------                               -----------         ------                     COVID-19 and FLU A/B PCR...[[id number]]  Normal              Final result                 Please view results for these tests on the individual orders.    COVID-19 and FLU A/B  PCR - Swab, Nasopharynx [[id number]]  (Normal) Collected: 07/27/22 2246    Lab Status: Final result Specimen: Swab from Nasopharynx Updated: 07/27/22 1455     COVID19 Not Detected     Influenza A PCR Not Detected     Influenza B PCR Not Detected    Narrative:      Fact sheet for providers: https://www.fda.gov/media/002223/download    Fact sheet for patients: https://www.fda.gov/media/793125/download    Test performed by PCR.          No radiology results from the last 24 hrs        I have reviewed the medications:  Scheduled Meds:amLODIPine, 10 mg, Oral, Daily  ampicillin-sulbactam, 3 g, Intravenous, Q8H  aspirin, 81 mg, Oral, Daily  cetirizine, 5 mg, Oral, Daily  heparin (porcine), 5,000 Units, Subcutaneous, Q8H  insulin detemir, 30 Units, Subcutaneous, Nightly  insulin lispro, 0-7 Units, Subcutaneous, TID AC  Insulin Lispro, 5 Units, Subcutaneous, TID With Meals  levothyroxine, 50 mcg, Oral, Q AM  metoprolol succinate XL, 50 mg, Oral, Q24H  sodium chloride, 10 mL, Intravenous, Q12H  vitamin D3, 5,000 Units, Oral, Daily      Continuous Infusions:   PRN Meds:.•  acetaminophen **OR** acetaminophen **OR** acetaminophen  •  bisacodyl  •  dextrose  •  dextrose  •  glucagon (human recombinant)  •  HYDROcodone-acetaminophen  •  ondansetron **OR** ondansetron  •  phenol  •  sennosides-docusate  •  [COMPLETED] Insert peripheral IV **AND** sodium chloride  •  sodium chloride    Assessment & Plan   Assessment & Plan     Active Hospital Problems    Diagnosis  POA   • Proximal phalanx fracture of the second digit extending into the second metatarsal joint [S92.919A]  Unknown   • Dislocation of left shoulder joint, initial encounter [S43.005A]  Yes   • Cellulitis in diabetic foot (HCC) [E11.628, L03.119]  Yes   • Fall [W19.XXXA]  Yes   • Sepsis (HCC) [A41.9]  Yes   • Leukocytosis [D72.829]  Yes   • Lactic acidosis [E87.2]  Yes   • Elevated C-reactive protein (CRP) [R79.82]  Yes   • Elevated sed rate (elev SR) [R70.0]  Yes   • SSS  (sick sinus syndrome) (HCC) [I49.5]  Yes   • Hyperlipidemia [E78.5]  Yes   • Hypertension [I10]  Yes   • S/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 Au [Z95.1]  Not Applicable   • Hypothyroidism (acquired) [E03.9]  Yes   • Type 2 diabetes mellitus (HCC) [E11.9]  Yes      Resolved Hospital Problems   No resolved problems to display.        Brief Hospital Course to date:  Jermaine Singh is a 77 y.o. male with PMH significant for HTN, HLD, CAD s/p CABG, PVD s/p RLE stent, CKD III, insulin-dependent DMII and hypothyroidism. He was admitted to [facility] 7/27/22 for L shoulder dislocation after a fall as well as sepsis secondary to L great toe osteomyelitis.     L great toe cellulitis / osteomyelitis  Peripheral vascular disease   - Followed by Dr. Joseph of podiatry - was on PO Doxycycline outpatient  - Bone scan concerning for osteomyelitis of L great toe and he is s/p L great toe amputation by Dr. Márquez 8/2/22   - arterial duplex obtained. Cardiology does not recommend revascularization at this time   - ID following - changed to Unasyn, will need 6 weeks from 8/2/22  - will likely need long-term access for IV abx. PICC ordered     Fall   Anterior L shoulder dislocation  Acute fracture of the  proximal phalanx of the second digit extending into the second metatarsophalangeal joint  - s/p reduction in the ED  - Appreciate ortho (Dr. Gipson) assistance   - Recommend non-operative treatment. Sling for comfort.   - PT for range of motion exercises, avoid Abduction and external rotation. WBAT on LUE with walker or knee scooter  - Follow up with Dr. Gipson in 2-3 weeks      Insulin-dependent DMII  - Hgb A1c 9.0%  - Continue Levemir 30 units QHS and Lispro to 5 units TID AC + SSI      [place] - worsening  - Baseline creatinine appears to be 0.9-1.2  - improving, 1.8 this morning     Hypertension  Coronary artery disease s/p CABG  SSS s/p PPM   - Cardiology has stopped ACEI and added Amlodipine 10mg daily   - Continue  Metoprolol XL 50mg daily   - scheduled for 12/28/2022 1:30 pm with device check and should keep that appt.     Hypothyroid  - Continue levothyroxine    Constipation  Nausea/Vomiting  - get KUB, PRN zofran  - aggressive bowel regimen    Expected Discharge Location and Transportation: rehab/[facility]   Expected Discharge Date: 8/10    DVT prophylaxis:  Medical and mechanical DVT prophylaxis orders are present.     AM-PAC 6 Clicks Score (PT): 11 (08/09/22 0800)    CODE STATUS:   Code Status and Medical Interventions:   Ordered at: 07/28/22 0000     Code Status (Patient has no pulse and is not breathing):    CPR (Attempt to Resuscitate)     Medical Interventions (Patient has pulse or is breathing):    Full Support       Rajani Farr,   08/09/22

## 2022-08-09 NOTE — CASE MANAGEMENT/SOCIAL WORK
Continued Stay Note  [facility]     Patient Name: Jermaine Singh  MRN: [id number]  Today's Date: 8/9/2022    Admit Date: 7/27/2022     Discharge Plan     Row Name 08/09/22 1704       Plan    Plan [facility]    Plan Comments [facility] has an acute rehab bed and can accept Mr Singh tomorrow 8/10 if medically ready, [facility] scheduled for transport at 1045. Mr Singh is in agreement with plan. Noted he still needs PICC line prior to transfer. Case mgt will f/u in am               Discharge Codes    No documentation.               Expected Discharge Date and Time     Expected Discharge Date Expected Discharge Time    Aug 10, 2022             Sonja C Kellerman, RN

## 2022-08-10 ENCOUNTER — APPOINTMENT (OUTPATIENT)
Dept: GENERAL RADIOLOGY | Facility: HOSPITAL | Age: 77
End: 2022-08-10

## 2022-08-10 LAB
ANION GAP SERPL CALCULATED.3IONS-SCNC: 12 MMOL/L (ref 5–15)
ANION GAP SERPL CALCULATED.3IONS-SCNC: 15 MMOL/L (ref 5–15)
ARTERIAL PATENCY WRIST A: ABNORMAL
ARTERIAL PATENCY WRIST A: ABNORMAL
ATMOSPHERIC PRESS: ABNORMAL MM[HG]
ATMOSPHERIC PRESS: ABNORMAL MM[HG]
BASE EXCESS BLDA CALC-SCNC: -1.1 MMOL/L (ref 0–2)
BASE EXCESS BLDA CALC-SCNC: -2.6 MMOL/L (ref 0–2)
BDY SITE: ABNORMAL
BDY SITE: ABNORMAL
BODY TEMPERATURE: 37 C
BODY TEMPERATURE: 37 C
BUN SERPL-MCNC: 42 MG/DL (ref 8–23)
BUN SERPL-MCNC: 43 MG/DL (ref 8–23)
BUN/CREAT SERPL: 22.6 (ref 7–25)
BUN/CREAT SERPL: 23.6 (ref 7–25)
CALCIUM SPEC-SCNC: 8.5 MG/DL (ref 8.6–10.5)
CALCIUM SPEC-SCNC: 8.7 MG/DL (ref 8.6–10.5)
CHLORIDE SERPL-SCNC: 108 MMOL/L (ref 98–107)
CHLORIDE SERPL-SCNC: 110 MMOL/L (ref 98–107)
CO2 BLDA-SCNC: 25.2 MMOL/L (ref 22–33)
CO2 BLDA-SCNC: 26.8 MMOL/L (ref 22–33)
CO2 SERPL-SCNC: 22 MMOL/L (ref 22–29)
CO2 SERPL-SCNC: 23 MMOL/L (ref 22–29)
COHGB MFR BLD: 1 % (ref 0–2)
COHGB MFR BLD: 1.4 % (ref 0–2)
CREAT SERPL-MCNC: 1.82 MG/DL (ref 0.76–1.27)
CREAT SERPL-MCNC: 1.86 MG/DL (ref 0.76–1.27)
DEPRECATED RDW RBC AUTO: 43.5 FL (ref 37–54)
EGFRCR SERPLBLD CKD-EPI 2021: 36.8 ML/MIN/1.73
EGFRCR SERPLBLD CKD-EPI 2021: 37.8 ML/MIN/1.73
EPAP: 8
ERYTHROCYTE [DISTWIDTH] IN BLOOD BY AUTOMATED COUNT: 13.1 % (ref 12.3–15.4)
GLUCOSE BLDC GLUCOMTR-MCNC: 125 MG/DL (ref 70–130)
GLUCOSE BLDC GLUCOMTR-MCNC: 167 MG/DL (ref 70–130)
GLUCOSE BLDC GLUCOMTR-MCNC: 219 MG/DL (ref 70–130)
GLUCOSE BLDC GLUCOMTR-MCNC: 238 MG/DL (ref 70–130)
GLUCOSE SERPL-MCNC: 115 MG/DL (ref 65–99)
GLUCOSE SERPL-MCNC: 236 MG/DL (ref 65–99)
HCO3 BLDA-SCNC: 23.9 MMOL/L (ref 20–26)
HCO3 BLDA-SCNC: 25.1 MMOL/L (ref 20–26)
HCT VFR BLD AUTO: 36.2 % (ref 37.5–51)
HCT VFR BLD CALC: 34.9 % (ref 38–51)
HCT VFR BLD CALC: 36.9 % (ref 38–51)
HGB BLD-MCNC: 11.7 G/DL (ref 13–17.7)
HGB BLDA-MCNC: 11.4 G/DL (ref 13.5–17.5)
HGB BLDA-MCNC: 12 G/DL (ref 13.5–17.5)
INHALED O2 CONCENTRATION: 60 %
INHALED O2 CONCENTRATION: 80 %
IPAP: 16
MAGNESIUM SERPL-MCNC: 2.5 MG/DL (ref 1.6–2.4)
MCH RBC QN AUTO: 29.3 PG (ref 26.6–33)
MCHC RBC AUTO-ENTMCNC: 32.3 G/DL (ref 31.5–35.7)
MCV RBC AUTO: 90.5 FL (ref 79–97)
METHGB BLD QL: 0.1 % (ref 0–1.5)
METHGB BLD QL: 0.4 % (ref 0–1.5)
MODALITY: ABNORMAL
MODALITY: ABNORMAL
NOTE: ABNORMAL
NOTE: ABNORMAL
NT-PROBNP SERPL-MCNC: ABNORMAL PG/ML (ref 0–1800)
OXYHGB MFR BLDV: 79 % (ref 94–99)
OXYHGB MFR BLDV: 97 % (ref 94–99)
PAW @ PEAK INSP FLOW SETTING VENT: 0 CMH2O
PCO2 BLDA: 40.3 MM HG (ref 35–45)
PCO2 BLDA: 55.7 MM HG (ref 35–45)
PCO2 TEMP ADJ BLD: 40.3 MM HG (ref 35–48)
PCO2 TEMP ADJ BLD: 55.7 MM HG (ref 35–48)
PH BLDA: 7.26 PH UNITS (ref 7.35–7.45)
PH BLDA: 7.38 PH UNITS (ref 7.35–7.45)
PH, TEMP CORRECTED: 7.26 PH UNITS
PH, TEMP CORRECTED: 7.38 PH UNITS
PLATELET # BLD AUTO: 309 10*3/MM3 (ref 140–450)
PMV BLD AUTO: 11.6 FL (ref 6–12)
PO2 BLDA: 106 MM HG (ref 83–108)
PO2 BLDA: 50.7 MM HG (ref 83–108)
PO2 TEMP ADJ BLD: 106 MM HG (ref 83–108)
PO2 TEMP ADJ BLD: 50.7 MM HG (ref 83–108)
POTASSIUM SERPL-SCNC: 4.3 MMOL/L (ref 3.5–5.2)
POTASSIUM SERPL-SCNC: 4.5 MMOL/L (ref 3.5–5.2)
PROCALCITONIN SERPL-MCNC: 0.17 NG/ML (ref 0–0.25)
RBC # BLD AUTO: 4 10*6/MM3 (ref 4.14–5.8)
SODIUM SERPL-SCNC: 145 MMOL/L (ref 136–145)
SODIUM SERPL-SCNC: 145 MMOL/L (ref 136–145)
TOTAL RATE: 16 BREATHS/MINUTE
TROPONIN T SERPL-MCNC: 0.11 NG/ML (ref 0–0.03)
TROPONIN T SERPL-MCNC: 0.13 NG/ML (ref 0–0.03)
VENTILATOR MODE: ABNORMAL
VENTILATOR MODE: ABNORMAL
WBC NRBC COR # BLD: 15.79 10*3/MM3 (ref 3.4–10.8)

## 2022-08-10 PROCEDURE — 25010000002 ONDANSETRON PER 1 MG: Performed by: PHYSICIAN ASSISTANT

## 2022-08-10 PROCEDURE — 71045 X-RAY EXAM CHEST 1 VIEW: CPT

## 2022-08-10 PROCEDURE — 02HV33Z INSERTION OF INFUSION DEVICE INTO SUPERIOR VENA CAVA, PERCUTANEOUS APPROACH: ICD-10-PCS | Performed by: PEDIATRICS

## 2022-08-10 PROCEDURE — 82375 ASSAY CARBOXYHB QUANT: CPT

## 2022-08-10 PROCEDURE — 25010000002 AMPICILLIN-SULBACTAM PER 1.5 G: Performed by: INTERNAL MEDICINE

## 2022-08-10 PROCEDURE — 99291 CRITICAL CARE FIRST HOUR: CPT | Performed by: NURSE PRACTITIONER

## 2022-08-10 PROCEDURE — 83050 HGB METHEMOGLOBIN QUAN: CPT

## 2022-08-10 PROCEDURE — 83735 ASSAY OF MAGNESIUM: CPT | Performed by: NURSE PRACTITIONER

## 2022-08-10 PROCEDURE — 93010 ELECTROCARDIOGRAM REPORT: CPT | Performed by: INTERNAL MEDICINE

## 2022-08-10 PROCEDURE — 84145 PROCALCITONIN (PCT): CPT | Performed by: NURSE PRACTITIONER

## 2022-08-10 PROCEDURE — 94799 UNLISTED PULMONARY SVC/PX: CPT

## 2022-08-10 PROCEDURE — 80048 BASIC METABOLIC PNL TOTAL CA: CPT | Performed by: NURSE PRACTITIONER

## 2022-08-10 PROCEDURE — 94660 CPAP INITIATION&MGMT: CPT

## 2022-08-10 PROCEDURE — 36600 WITHDRAWAL OF ARTERIAL BLOOD: CPT

## 2022-08-10 PROCEDURE — 99024 POSTOP FOLLOW-UP VISIT: CPT | Performed by: THORACIC SURGERY (CARDIOTHORACIC VASCULAR SURGERY)

## 2022-08-10 PROCEDURE — 84484 ASSAY OF TROPONIN QUANT: CPT | Performed by: NURSE PRACTITIONER

## 2022-08-10 PROCEDURE — 25010000002 PROCHLORPERAZINE 10 MG/2ML SOLUTION: Performed by: INTERNAL MEDICINE

## 2022-08-10 PROCEDURE — C1751 CATH, INF, PER/CENT/MIDLINE: HCPCS

## 2022-08-10 PROCEDURE — 85027 COMPLETE CBC AUTOMATED: CPT | Performed by: NURSE PRACTITIONER

## 2022-08-10 PROCEDURE — C1894 INTRO/SHEATH, NON-LASER: HCPCS

## 2022-08-10 PROCEDURE — 83880 ASSAY OF NATRIURETIC PEPTIDE: CPT | Performed by: NURSE PRACTITIONER

## 2022-08-10 PROCEDURE — 25010000002 FUROSEMIDE PER 20 MG: Performed by: INTERNAL MEDICINE

## 2022-08-10 PROCEDURE — 82805 BLOOD GASES W/O2 SATURATION: CPT

## 2022-08-10 PROCEDURE — 25010000002 HEPARIN (PORCINE) PER 1000 UNITS: Performed by: PHYSICIAN ASSISTANT

## 2022-08-10 PROCEDURE — 63710000001 INSULIN DETEMIR PER 5 UNITS: Performed by: PHYSICIAN ASSISTANT

## 2022-08-10 PROCEDURE — 99232 SBSQ HOSP IP/OBS MODERATE 35: CPT | Performed by: INTERNAL MEDICINE

## 2022-08-10 PROCEDURE — 80048 BASIC METABOLIC PNL TOTAL CA: CPT | Performed by: INTERNAL MEDICINE

## 2022-08-10 PROCEDURE — 82962 GLUCOSE BLOOD TEST: CPT

## 2022-08-10 PROCEDURE — 93005 ELECTROCARDIOGRAM TRACING: CPT | Performed by: NURSE PRACTITIONER

## 2022-08-10 RX ORDER — SODIUM CHLORIDE 0.9 % (FLUSH) 0.9 %
10 SYRINGE (ML) INJECTION AS NEEDED
Status: DISCONTINUED | OUTPATIENT
Start: 2022-08-10 | End: 2022-08-26 | Stop reason: HOSPADM

## 2022-08-10 RX ORDER — ALBUTEROL SULFATE 2.5 MG/3ML
2.5 SOLUTION RESPIRATORY (INHALATION) EVERY 6 HOURS PRN
Status: DISCONTINUED | OUTPATIENT
Start: 2022-08-10 | End: 2022-08-14

## 2022-08-10 RX ORDER — FUROSEMIDE 10 MG/ML
40 INJECTION INTRAMUSCULAR; INTRAVENOUS ONCE
Status: COMPLETED | OUTPATIENT
Start: 2022-08-10 | End: 2022-08-10

## 2022-08-10 RX ORDER — SODIUM CHLORIDE 9 MG/ML
100 INJECTION, SOLUTION INTRAVENOUS CONTINUOUS
Status: DISCONTINUED | OUTPATIENT
Start: 2022-08-10 | End: 2022-08-10

## 2022-08-10 RX ORDER — SODIUM CHLORIDE 0.9 % (FLUSH) 0.9 %
10 SYRINGE (ML) INJECTION EVERY 12 HOURS SCHEDULED
Status: DISCONTINUED | OUTPATIENT
Start: 2022-08-10 | End: 2022-08-26 | Stop reason: HOSPADM

## 2022-08-10 RX ADMIN — SODIUM CHLORIDE 100 ML/HR: 9 INJECTION, SOLUTION INTRAVENOUS at 10:22

## 2022-08-10 RX ADMIN — POLYETHYLENE GLYCOL 3350 17 G: 17 POWDER, FOR SOLUTION ORAL at 09:26

## 2022-08-10 RX ADMIN — METOPROLOL SUCCINATE 50 MG: 50 TABLET, EXTENDED RELEASE ORAL at 09:25

## 2022-08-10 RX ADMIN — HEPARIN SODIUM 5000 UNITS: 5000 INJECTION INTRAVENOUS; SUBCUTANEOUS at 14:16

## 2022-08-10 RX ADMIN — ONDANSETRON 4 MG: 2 INJECTION INTRAMUSCULAR; INTRAVENOUS at 09:25

## 2022-08-10 RX ADMIN — Medication 10 ML: at 20:16

## 2022-08-10 RX ADMIN — HEPARIN SODIUM 5000 UNITS: 5000 INJECTION INTRAVENOUS; SUBCUTANEOUS at 05:49

## 2022-08-10 RX ADMIN — PROCHLORPERAZINE EDISYLATE 5 MG: 5 INJECTION INTRAMUSCULAR; INTRAVENOUS at 20:39

## 2022-08-10 RX ADMIN — AMLODIPINE BESYLATE 10 MG: 10 TABLET ORAL at 09:25

## 2022-08-10 RX ADMIN — HEPARIN SODIUM 5000 UNITS: 5000 INJECTION INTRAVENOUS; SUBCUTANEOUS at 21:54

## 2022-08-10 RX ADMIN — FUROSEMIDE 40 MG: 10 INJECTION, SOLUTION INTRAMUSCULAR; INTRAVENOUS at 16:34

## 2022-08-10 RX ADMIN — LEVOTHYROXINE SODIUM 50 MCG: 50 TABLET ORAL at 05:49

## 2022-08-10 RX ADMIN — SODIUM CHLORIDE 3 G: 900 INJECTION INTRAVENOUS at 16:07

## 2022-08-10 RX ADMIN — Medication 10 ML: at 14:16

## 2022-08-10 RX ADMIN — ONDANSETRON 4 MG: 2 INJECTION INTRAMUSCULAR; INTRAVENOUS at 16:07

## 2022-08-10 RX ADMIN — INSULIN DETEMIR 30 UNITS: 100 INJECTION, SOLUTION SUBCUTANEOUS at 20:16

## 2022-08-10 RX ADMIN — SODIUM CHLORIDE 3 G: 900 INJECTION INTRAVENOUS at 00:33

## 2022-08-10 RX ADMIN — SODIUM CHLORIDE 3 G: 900 INJECTION INTRAVENOUS at 09:24

## 2022-08-10 RX ADMIN — ASPIRIN 81 MG CHEWABLE TABLET 81 MG: 81 TABLET CHEWABLE at 09:25

## 2022-08-10 RX ADMIN — CETIRIZINE HYDROCHLORIDE 5 MG: 10 TABLET, FILM COATED ORAL at 09:25

## 2022-08-10 RX ADMIN — Medication 5000 UNITS: at 09:25

## 2022-08-10 RX ADMIN — Medication 10 ML: at 20:17

## 2022-08-10 NOTE — PLAN OF CARE
Goal Outcome Evaluation:  Plan of Care Reviewed With: patient vomiting this am.   Instructed pt to take miralax. Pt has stool on his KUB. Fluids infusing.  PT/OT working with pt. Picc line to be inserted in order to go to [facility]        Progress: declining

## 2022-08-10 NOTE — PLAN OF CARE
Problem: Fall Injury Risk  Goal: Absence of Fall and Fall-Related Injury  Outcome: Ongoing, Progressing     Problem: Skin Injury Risk Increased  Goal: Skin Health and Integrity  Outcome: Ongoing, Progressing     Problem: Mobility Impairment  Goal: Optimal Mobility  Outcome: Ongoing, Progressing   Goal Outcome Evaluation:

## 2022-08-10 NOTE — CASE MANAGEMENT/SOCIAL WORK
Continued Stay Note  [facility]     Patient Name: Jermaine Singh  MRN: [id number]  Today's Date: 8/10/2022    Admit Date: 7/27/2022     Discharge Plan     Row Name 08/10/22 1330       Plan    Plan [facility]    Plan Comments Case mgt f/u. Transfer to acute rehab bed at [facility] postponed until tomorrow 8/11. [facility] rescheduled for 0930 on 8/11. Discussed with Mr Singh               Discharge Codes    No documentation.               Expected Discharge Date and Time     Expected Discharge Date Expected Discharge Time    Aug 10, 2022             Sonja C Kellerman, RN

## 2022-08-10 NOTE — PROGRESS NOTES
Cardiothoracic Surgery Progress Note      POD #: 8-left great toe transmetatarsal amputation with primary closure     LOS: 14 days      Subjective: Awake and alert    Objective:  Vital Signs vital signs below noted T-max past 24 hours 98.9 °F  Temp:  [98.3 °F (36.8 °C)-98.9 °F (37.2 °C)] 98.3 °F (36.8 °C)  Heart Rate:  [71-83] 80  Resp:  [18] 18  BP: (150-168)/(81-91) 159/88    Physical Exam:   General Appearance: Oriented x3   Lungs:   Heart:   Skin:   Incision: Amputation site dressing change.  Suture intact skin margin viable and skin flaps are viable.     Results:  Results from last 7 days   Lab Units 08/06/22  1222   WBC 10*3/mm3 9.19   HEMOGLOBIN g/dL 12.1*   HEMATOCRIT % 35.5*   PLATELETS 10*3/mm3 240     Results from last 7 days   Lab Units 08/09/22  0913   SODIUM mmol/L 145   POTASSIUM mmol/L 4.2   CHLORIDE mmol/L 111*   CO2 mmol/L 22.0   BUN mg/dL 39*   CREATININE mg/dL 1.81*   GLUCOSE mg/dL 130*   CALCIUM mg/dL 9.2         Assessment: #1.  Postop day 8 left great toe transmetatarsal amputation primary closure healing well at this time  2 status post remote coronary bypass grafting  3 status post pacemaker placement for sick sinus syndrome  4 recent fall left shoulder dislocation reduced in the emergency department #5 postop endovascular stenting/angioplasty right lower extremity per Dr. Billy Hernandez in 2019      Plan: Continue daily dressing change amputation site.  Medical manage per hospitalist.  Antibiotics per infectious disease.  Okay for rehab or home health at any time for my concerns.      Gopal Márquez MD - 08/10/22 - 06:10 EDT

## 2022-08-10 NOTE — NURSING NOTE
Pt was 77% on R/a. Pt was cyanotic. N/C at 6l placed on pt. Using excessory  muscles to breathe.  nonrebreather placed on pt at 100%. Sat went up to 80% lasix IVP given .   Rapid response called at 1720. Temp 98.3 axillary. Bp normal. EKG  Report was vpaced heart rate remained stable.  Pt has permanent pacer.  Pt c/o chest pain. tropinin was 0.108.  Respiratory placed a bipap on pt and sat was 100%. Pt was very tired from having difficulty breathing he fell asleep but easily aroused. . insulin was not given due to pt not eating. Pt had a bm and soaked diaper. Pt cleaned up and new linens and gown

## 2022-08-10 NOTE — SIGNIFICANT NOTE
[facility] Medicine Services  CRITICAL CARE NOTE    Patient Name: Jermaine Singh  : 1945  MRN: [id number]    Date of Admission: 2022  Length of Stay: 14    Subjective     Event/HPI:  Dyspnea, hypoxia  Pt developed acute onset of dyspnea/hypoxia. Cxray suggestive of fluid vol overload. ABG pH 7.26, pCO2 55.7, HCO3 25.1. Pt received lasix and BiPAP with improvement. Will repeat ABG in 1 hour.     Objective     Vital Signs:   Temp:  [97.8 °F (36.6 °C)-98.8 °F (37.1 °C)] 97.8 °F (36.6 °C)  Heart Rate:  [71-83] 79  Resp:  [16-18] 18  BP: (137-168)/(81-98) 137/98       Pertinent Physical Exam:  Constitutional: Awake, alert, mod distress due to dyspnea  Respiratory: crackles noted throughout  Cardiovascular: RRR, no murmurs, rubs, or gallop    Results Reviewed:  I have personally reviewed current lab, radiology, and data and agree.    Results from last 7 days   Lab Units 08/10/22  1638 22  1222 22  0618   WBC 10*3/mm3 15.79* 9.19 9.74   HEMOGLOBIN g/dL 11.7* 12.1* 12.1*   HEMATOCRIT % 36.2* 35.5* 35.3*   PLATELETS 10*3/mm3 309 240 229     Results from last 7 days   Lab Units 08/10/22  0607 22  0913 22  0708   SODIUM mmol/L 145 145 137   POTASSIUM mmol/L 4.3 4.2 4.2   CHLORIDE mmol/L 110* 111* 106   CO2 mmol/L 23.0 22.0 20.0*   BUN mg/dL 42* 39* 35*   CREATININE mg/dL 1.86* 1.81* 1.70*   GLUCOSE mg/dL 115* 130* 243*   CALCIUM mg/dL 8.5* 9.2 8.6     No results found for: BNP  pH, Arterial   Date Value Ref Range Status   08/10/2022 7.262 (L) 7.350 - 7.450 pH units Final     Comment:     84 Value below reference range       Microbiology Results Abnormal     Procedure Component Value - Date/Time    Anaerobic Culture - Tissue, Toe, Left [[id number]] Collected: 22 1027    Lab Status: Final result Specimen: Tissue from Toe, Left Updated: 22 0858     Anaerobic Culture No anaerobes isolated at 5 days    Blood Culture - Blood, Hand, Left [[id number]]  (Normal)  Collected: 07/27/22 2040    Lab Status: Final result Specimen: Blood from Hand, Left Updated: 08/01/22 2132     Blood Culture No growth at 5 days    Blood Culture - Blood, Hand, Right [[id number]]  (Normal) Collected: 07/27/22 2030    Lab Status: Final result Specimen: Blood from Hand, Right Updated: 08/01/22 2132     Blood Culture No growth at 5 days    COVID PRE-OP / PRE-PROCEDURE SCREENING ORDER (NO ISOLATION) - Swab, Nasopharynx [[id number]]  (Normal) Collected: 07/27/22 2246    Lab Status: Final result Specimen: Swab from Nasopharynx Updated: 07/27/22 2357    Narrative:      The following orders were created for panel order COVID PRE-OP / PRE-PROCEDURE SCREENING ORDER (NO ISOLATION) - Swab, Nasopharynx.  Procedure                               Abnormality         Status                     ---------                               -----------         ------                     COVID-19 and FLU A/B PCR...[[id number]]  Normal              Final result                 Please view results for these tests on the individual orders.    COVID-19 and FLU A/B PCR - Swab, Nasopharynx [[id number]]  (Normal) Collected: 07/27/22 2246    Lab Status: Final result Specimen: Swab from Nasopharynx Updated: 07/27/22 2357     COVID19 Not Detected     Influenza A PCR Not Detected     Influenza B PCR Not Detected    Narrative:      Fact sheet for providers: https://www.fda.gov/media/191658/download    Fact sheet for patients: https://www.fda.gov/media/764012/download    Test performed by PCR.          Imaging Results (Last 24 Hours)     Procedure Component Value Units Date/Time    XR Chest 1 View [[id number]] Collected: 08/10/22 1642     Updated: 08/10/22 1646    Narrative:      XR CHEST 1 VW-     Date of Exam: 8/10/2022 4:26 PM     Indication: shortness of breath; S43.005A-Unspecified dislocation of  left shoulder joint, initial encounter; L03.116-Cellulitis of left lower  limb; Z78.9-Other specified health status.     Comparison Exams:  August 10, 2022     Technique: Single AP chest radiograph     FINDINGS:  Median sternotomy wires appear intact. A left subclavian pacemaker is in  place. There are coarse bilateral reticular markings. The heart is  enlarged. A right-sided PICC has its tip at the cavoatrial junction.       Impression:      1.  Coarse bilateral reticular markings, which could reflect  interstitial pulmonary edema or atypical pneumonia.  2.  Cardiomegaly.     This report was finalized on 8/10/2022 4:43 PM by Jf Cadet MD.       XR Chest 1 View [[id number]] Collected: 08/10/22 1321     Updated: 08/10/22 1325    Narrative:      DATE OF EXAM:  8/10/2022 1:13 PM     PROCEDURE:  XR CHEST 1 VW-     INDICATIONS:  verify PICC; S43.005A-Unspecified dislocation of left shoulder joint,  initial encounter; L03.116-Cellulitis of left lower limb; Z78.9-Other  specified health status       COMPARISON:  AP portable chest 5/18/2022.     TECHNIQUE:   Single radiographic view of the chest was obtained.     FINDINGS:  Stable cardiac enlargement with CABG. Pacemaker device appears  unchanged. Right arm approach PICC has been placed terminating at the  level of the mid SVC. There is no visible pneumothorax. Faint  interstitial thickening has developed throughout both lungs, which may  represent mild edema. No focal dense consolidations are identified.  There is no pleural effusion. No acute osseous abnormalities.       Impression:         1. Right arm approach PICC tip placement to the mid SVC level. No  visible pneumothorax.  2. Mild diffuse interstitial thickening in both lungs may represent mild  edema.  3. Stable cardiomegaly.     This report was finalized on 8/10/2022 1:22 PM by Praveena Pérez MD.               I have reviewed the current medications.    Assessment / Plan     Active Hospital Problems    Diagnosis  POA   • Proximal phalanx fracture of the second digit extending into the second metatarsal joint [S92.919A]  Unknown   • Dislocation  of left shoulder joint, initial encounter [S43.005A]  Yes   • Cellulitis in diabetic foot (HCC) [E11.628, L03.119]  Yes   • Fall [W19.XXXA]  Yes   • Sepsis (HCC) [A41.9]  Yes   • Leukocytosis [D72.829]  Yes   • Lactic acidosis [E87.2]  Yes   • Elevated C-reactive protein (CRP) [R79.82]  Yes   • Elevated sed rate (elev SR) [R70.0]  Yes   • SSS (sick sinus syndrome) ([facility]) [I49.5]  Yes     Added automatically from request for surgery [id number]     • Hyperlipidemia [E78.5]  Yes   • Hypertension [I10]  Yes   • S/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 Au [Z95.1]  Not Applicable   • Hypothyroidism (acquired) [E03.9]  Yes   • Type 2 diabetes mellitus (HCC) [E11.9]  Yes     HA1C on 3/21/19 9.0 improved from 11.1 on 1/8/19       Pertinent Assessment & Plan:   Acute hypoxic respiratory failure due to Fluid vol overload  - lasix 40 mg IV x1 and BiPap with improvement. Will repeat ABG in 1 hour. ECHO tomorrow. Troponin elevated without acute EKG changes. Will continue to trend troponin and EKG.       CODE STATUS:    Code Status and Medical Interventions:   Ordered at: 07/28/22 0000     Code Status (Patient has no pulse and is not breathing):    CPR (Attempt to Resuscitate)     Medical Interventions (Patient has pulse or is breathing):    Full Support       Critical Care time spent in direct patient care:    30 minutes (excluding procedure time, if applicable) including high complexity decision making to assess and treat vital organ system failure in this individual who has impairment of one or more vital organ systems such that there is a high probability of imminent or life threatening deterioration in the patient’s condition. Failure to initiate the above interventions on an urgent basis would likely result in sudden, clinically significant or life threatening deterioration in the patient's condition.      Electronically signed by DAVID Beauchamp, 08/10/22, 5:00 PM EDT.

## 2022-08-10 NOTE — PROGRESS NOTES
[facility] Medicine Services  PROGRESS NOTE    Patient Name: Jermaine Singh  : 1945  MRN: [id number]    Date of Admission: 2022  Primary Care Physician: Farooq Painter MD    Subjective   Subjective     CC:  F/u osteomyelitis    HPI:  Patient resting in bed. Still vomiting. Not having much luck with bowel movements. Refused PICC line. Denies abdominal pain.    ROS:  Gen- No fevers, chills  CV- No chest pain, palpitations  Resp- No cough, dyspnea  GI- +nausea/vomiting, no abd pain    Objective   Objective     Vital Signs:   Temp:  [98.2 °F (36.8 °C)-98.9 °F (37.2 °C)] 98.2 °F (36.8 °C)  Heart Rate:  [71-83] 77  Resp:  [16-18] 16  BP: (148-168)/(81-91) 156/82     Physical Exam:  Constitutional: No acute distress, awake, alert, in bed  HENT: NCAT, mucous membranes moist  Respiratory: Clear to auscultation bilaterally, respiratory effort normal   Cardiovascular: RRR, no murmurs, rubs, or gallops  Gastrointestinal: soft, nontender, slightly more distended today  Musculoskeletal: No bilateral ankle edema, LLE in boot  Psychiatric: Appropriate affect, cooperative  Neurologic: Oriented x 3, strength symmetric in all extremities, Cranial Nerves grossly intact to confrontation, speech clear  Skin: No rashes        Results Reviewed:  LAB RESULTS:      Lab 22  1222 22  0618 22  0539   WBC 9.19 9.74 11.13*   HEMOGLOBIN 12.1* 12.1* 12.1*   HEMATOCRIT 35.5* 35.3* 36.4*   PLATELETS 240 229 237   MCV 87.2 86.9 88.6         Lab 08/10/22  0607 22  0913 22  0708 22  1532 22  1222   SODIUM 145 145 137 139 142   POTASSIUM 4.3 4.2 4.2 4.3 4.3   CHLORIDE 110* 111* 106 110* 110*   CO2 23.0 22.0 20.0* 19.0* 24.0   ANION GAP 12.0 12.0 11.0 10.0 8.0   BUN 42* 39* 35* 37* 35*   CREATININE 1.86* 1.81* 1.70* 1.81* 2.01*   EGFR 36.8* 38.0* 41.0* 38.0* 33.5*   GLUCOSE 115* 130* 243* 131* 128*   CALCIUM 8.5* 9.2 8.6 8.5* 8.3*                 Lab 22  0539    CHOLESTEROL 180   LDL CHOL 113*   HDL CHOL 39*   TRIGLYCERIDES 160*             Brief Urine Lab Results  (Last result in the past 365 days)      Color   Clarity   Blood   Leuk Est   Nitrite   Protein   CREAT   Urine HCG        02/27/22 1321 Dark Yellow   Clear   Negative   Negative   Negative   2+                 Microbiology Results Abnormal     Procedure Component Value - Date/Time    Anaerobic Culture - Tissue, Toe, Left [[id number]] Collected: 08/02/22 1027    Lab Status: Final result Specimen: Tissue from Toe, Left Updated: 08/07/22 0858     Anaerobic Culture No anaerobes isolated at 5 days    Blood Culture - Blood, Hand, Left [[id number]]  (Normal) Collected: 07/27/22 2040    Lab Status: Final result Specimen: Blood from Hand, Left Updated: 08/01/22 2132     Blood Culture No growth at 5 days    Blood Culture - Blood, Hand, Right [[id number]]  (Normal) Collected: 07/27/22 2030    Lab Status: Final result Specimen: Blood from Hand, Right Updated: 08/01/22 2132     Blood Culture No growth at 5 days    COVID PRE-OP / PRE-PROCEDURE SCREENING ORDER (NO ISOLATION) - Swab, Nasopharynx [[id number]]  (Normal) Collected: 07/27/22 2246    Lab Status: Final result Specimen: Swab from Nasopharynx Updated: 07/27/22 2357    Narrative:      The following orders were created for panel order COVID PRE-OP / PRE-PROCEDURE SCREENING ORDER (NO ISOLATION) - Swab, Nasopharynx.  Procedure                               Abnormality         Status                     ---------                               -----------         ------                     COVID-19 and FLU A/B PCR...[[id number]]  Normal              Final result                 Please view results for these tests on the individual orders.    COVID-19 and FLU A/B PCR - Swab, Nasopharynx [[id number]]  (Normal) Collected: 07/27/22 2246    Lab Status: Final result Specimen: Swab from Nasopharynx Updated: 07/27/22 2357     COVID19 Not Detected     Influenza A PCR Not Detected      Influenza B PCR Not Detected    Narrative:      Fact sheet for providers: https://www.fda.gov/media/888085/download    Fact sheet for patients: https://www.fda.gov/media/113331/download    Test performed by PCR.          XR Abdomen KUB    Result Date: 8/9/2022  XR ABDOMEN KUB-  Date of Exam: 8/9/2022 2:25 PM  Indication: vomiting constipation; S43.005A-Unspecified dislocation of left shoulder joint, initial encounter; L03.116-Cellulitis of left lower limb; Z78.9-Other specified health status.  Comparison Exams: None available.  Technique: Single AP radiograph of the abdomen  FINDINGS: There is a nonobstructive bowel gas pattern. A large amount of stool is present throughout the colon. No pathological calcifications are identified. The osseous structures appear intact.      Impression: No acute process identified. Large amount of stool throughout colon.  This report was finalized on 8/9/2022 2:42 PM by Jf Cadet MD.            I have reviewed the medications:  Scheduled Meds:amLODIPine, 10 mg, Oral, Daily  ampicillin-sulbactam, 3 g, Intravenous, Q8H  aspirin, 81 mg, Oral, Daily  cetirizine, 5 mg, Oral, Daily  heparin (porcine), 5,000 Units, Subcutaneous, Q8H  insulin detemir, 30 Units, Subcutaneous, Nightly  insulin lispro, 0-7 Units, Subcutaneous, TID AC  Insulin Lispro, 5 Units, Subcutaneous, TID With Meals  levothyroxine, 50 mcg, Oral, Q AM  metoprolol succinate XL, 50 mg, Oral, Q24H  polyethylene glycol, 17 g, Oral, Daily  sodium chloride, 10 mL, Intravenous, Q12H  vitamin D3, 5,000 Units, Oral, Daily      Continuous Infusions:sodium chloride, 100 mL/hr, Last Rate: 100 mL/hr (08/10/22 1022)      PRN Meds:.•  acetaminophen **OR** acetaminophen **OR** acetaminophen  •  bisacodyl  •  dextrose  •  dextrose  •  glucagon (human recombinant)  •  HYDROcodone-acetaminophen  •  ondansetron **OR** ondansetron  •  phenol  •  prochlorperazine  •  sennosides-docusate  •  [COMPLETED] Insert peripheral IV **AND** sodium  chloride  •  sodium chloride    Assessment & Plan   Assessment & Plan     Active Hospital Problems    Diagnosis  POA   • Proximal phalanx fracture of the second digit extending into the second metatarsal joint [S92.919A]  Unknown   • Dislocation of left shoulder joint, initial encounter [S43.005A]  Yes   • Cellulitis in diabetic foot (HCC) [E11.628, L03.119]  Yes   • Fall [W19.XXXA]  Yes   • Sepsis (HCC) [A41.9]  Yes   • Leukocytosis [D72.829]  Yes   • Lactic acidosis [E87.2]  Yes   • Elevated C-reactive protein (CRP) [R79.82]  Yes   • Elevated sed rate (elev SR) [R70.0]  Yes   • SSS (sick sinus syndrome) ([facility]) [I49.5]  Yes   • Hyperlipidemia [E78.5]  Yes   • Hypertension [I10]  Yes   • S/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 Au [Z95.1]  Not Applicable   • Hypothyroidism (acquired) [E03.9]  Yes   • Type 2 diabetes mellitus (HCC) [E11.9]  Yes      Resolved Hospital Problems   No resolved problems to display.        Brief Hospital Course to date:  Jermaine Singh is a 77 y.o. male with PMH significant for HTN, HLD, CAD s/p CABG, PVD s/p RLE stent, CKD III, insulin-dependent DMII and hypothyroidism. He was admitted to [facility] 7/27/22 for L shoulder dislocation after a fall as well as sepsis secondary to L great toe osteomyelitis.     L great toe cellulitis / osteomyelitis  Peripheral vascular disease   - Followed by Dr. Joseph of podiatry - was on PO Doxycycline outpatient  - Bone scan concerning for osteomyelitis of L great toe and he is s/p L great toe amputation by Dr. Márquez 8/2/22   - arterial duplex obtained. Cardiology does not recommend revascularization at this time   - ID following - changed to Unasyn, will need 6 weeks from 8/2/22  - will likely need long-term access for IV abx. PICC to be placed today.     Fall   Anterior L shoulder dislocation  - s/p reduction in the ED  - Appreciate ortho (Dr. Gipson) assistance   - Recommend non-operative treatment. Sling for comfort.   - PT for range of  motion exercises, avoid Abduction and external rotation. WBAT on LUE with walker or knee scooter  - Follow up with Dr. Gipson in 2-3 weeks     Constipation  Nausea/Vomiting  - KUB with large stool burden otherwise no acute process present  - IVF, zofran and compazine PRN  - aggressive bowel regimen     Insulin-dependent DMII  - Hgb A1c 9.0%  - Continue Levemir 30 units QHS and Lispro to 5 units TID AC + SSI      [place] - worsening  - Baseline creatinine appears to be 0.9-1.2  - suspect worsening in setting of vomiting, poor intake  - will restart gentle fluids, recheck in AM     Hypertension  Coronary artery disease s/p CABG  SSS s/p PPM   - Cardiology has stopped ACEI and added Amlodipine 10mg daily   - Continue Metoprolol XL 50mg daily   - scheduled for 12/28/2022 1:30 pm with device check and should keep that appt.     Hypothyroid  - Continue levothyroxine    Expected Discharge Location and Transportation: rehab/[facility]   Expected Discharge Date: 8/11, delayed due to vomiting, constipation    DVT prophylaxis:  Medical and mechanical DVT prophylaxis orders are present.     AM-PAC 6 Clicks Score (PT): 11 (08/09/22 2000)    CODE STATUS:   Code Status and Medical Interventions:   Ordered at: 07/28/22 0000     Code Status (Patient has no pulse and is not breathing):    CPR (Attempt to Resuscitate)     Medical Interventions (Patient has pulse or is breathing):    Full Support       Rajani Farr,   08/10/22

## 2022-08-11 ENCOUNTER — APPOINTMENT (OUTPATIENT)
Dept: CARDIOLOGY | Facility: HOSPITAL | Age: 77
End: 2022-08-11

## 2022-08-11 ENCOUNTER — APPOINTMENT (OUTPATIENT)
Dept: GENERAL RADIOLOGY | Facility: HOSPITAL | Age: 77
End: 2022-08-11

## 2022-08-11 LAB
ANION GAP SERPL CALCULATED.3IONS-SCNC: 13 MMOL/L (ref 5–15)
APTT PPP: 33.1 SECONDS (ref 60–90)
ARTERIAL PATENCY WRIST A: ABNORMAL
ATMOSPHERIC PRESS: ABNORMAL MM[HG]
BASE EXCESS BLDA CALC-SCNC: 0.8 MMOL/L (ref 0–2)
BASOPHILS # BLD AUTO: 0.04 10*3/MM3 (ref 0–0.2)
BASOPHILS # BLD AUTO: 0.05 10*3/MM3 (ref 0–0.2)
BASOPHILS NFR BLD AUTO: 0.3 % (ref 0–1.5)
BASOPHILS NFR BLD AUTO: 0.3 % (ref 0–1.5)
BDY SITE: ABNORMAL
BODY TEMPERATURE: 37 C
BUN SERPL-MCNC: 46 MG/DL (ref 8–23)
BUN/CREAT SERPL: 25.6 (ref 7–25)
CALCIUM SPEC-SCNC: 8.9 MG/DL (ref 8.6–10.5)
CHLORIDE SERPL-SCNC: 106 MMOL/L (ref 98–107)
CO2 BLDA-SCNC: 26.4 MMOL/L (ref 22–33)
CO2 SERPL-SCNC: 26 MMOL/L (ref 22–29)
COHGB MFR BLD: 0.9 % (ref 0–2)
CREAT SERPL-MCNC: 1.8 MG/DL (ref 0.76–1.27)
D-LACTATE SERPL-SCNC: 1.2 MMOL/L (ref 0.5–2)
DEPRECATED RDW RBC AUTO: 43.5 FL (ref 37–54)
DEPRECATED RDW RBC AUTO: 43.7 FL (ref 37–54)
EGFRCR SERPLBLD CKD-EPI 2021: 38.3 ML/MIN/1.73
EOSINOPHIL # BLD AUTO: 0 10*3/MM3 (ref 0–0.4)
EOSINOPHIL # BLD AUTO: 0 10*3/MM3 (ref 0–0.4)
EOSINOPHIL NFR BLD AUTO: 0 % (ref 0.3–6.2)
EOSINOPHIL NFR BLD AUTO: 0 % (ref 0.3–6.2)
EPAP: 0
ERYTHROCYTE [DISTWIDTH] IN BLOOD BY AUTOMATED COUNT: 13.1 % (ref 12.3–15.4)
ERYTHROCYTE [DISTWIDTH] IN BLOOD BY AUTOMATED COUNT: 13.2 % (ref 12.3–15.4)
GLUCOSE BLDC GLUCOMTR-MCNC: 134 MG/DL (ref 70–130)
GLUCOSE BLDC GLUCOMTR-MCNC: 165 MG/DL (ref 70–130)
GLUCOSE BLDC GLUCOMTR-MCNC: 165 MG/DL (ref 70–130)
GLUCOSE BLDC GLUCOMTR-MCNC: 173 MG/DL (ref 70–130)
GLUCOSE BLDC GLUCOMTR-MCNC: 92 MG/DL (ref 70–130)
GLUCOSE BLDC GLUCOMTR-MCNC: 93 MG/DL (ref 70–130)
GLUCOSE SERPL-MCNC: 185 MG/DL (ref 65–99)
HCO3 BLDA-SCNC: 25.2 MMOL/L (ref 20–26)
HCT VFR BLD AUTO: 33.7 % (ref 37.5–51)
HCT VFR BLD AUTO: 35.7 % (ref 37.5–51)
HCT VFR BLD CALC: 34.9 % (ref 38–51)
HGB BLD-MCNC: 11 G/DL (ref 13–17.7)
HGB BLD-MCNC: 11.8 G/DL (ref 13–17.7)
HGB BLDA-MCNC: 11.4 G/DL (ref 13.5–17.5)
IMM GRANULOCYTES # BLD AUTO: 0.06 10*3/MM3 (ref 0–0.05)
IMM GRANULOCYTES # BLD AUTO: 0.07 10*3/MM3 (ref 0–0.05)
IMM GRANULOCYTES NFR BLD AUTO: 0.5 % (ref 0–0.5)
IMM GRANULOCYTES NFR BLD AUTO: 0.5 % (ref 0–0.5)
INHALED O2 CONCENTRATION: 32 %
INR PPP: 1.05 (ref 0.84–1.13)
IPAP: 0
LYMPHOCYTES # BLD AUTO: 0.6 10*3/MM3 (ref 0.7–3.1)
LYMPHOCYTES # BLD AUTO: 0.76 10*3/MM3 (ref 0.7–3.1)
LYMPHOCYTES NFR BLD AUTO: 3.9 % (ref 19.6–45.3)
LYMPHOCYTES NFR BLD AUTO: 6.5 % (ref 19.6–45.3)
MAGNESIUM SERPL-MCNC: 2.3 MG/DL (ref 1.6–2.4)
MCH RBC QN AUTO: 29.4 PG (ref 26.6–33)
MCH RBC QN AUTO: 29.6 PG (ref 26.6–33)
MCHC RBC AUTO-ENTMCNC: 32.6 G/DL (ref 31.5–35.7)
MCHC RBC AUTO-ENTMCNC: 33.1 G/DL (ref 31.5–35.7)
MCV RBC AUTO: 89 FL (ref 79–97)
MCV RBC AUTO: 90.6 FL (ref 79–97)
METHGB BLD QL: 0.3 % (ref 0–1.5)
MODALITY: ABNORMAL
MONOCYTES # BLD AUTO: 1.01 10*3/MM3 (ref 0.1–0.9)
MONOCYTES # BLD AUTO: 1.26 10*3/MM3 (ref 0.1–0.9)
MONOCYTES NFR BLD AUTO: 8.2 % (ref 5–12)
MONOCYTES NFR BLD AUTO: 8.6 % (ref 5–12)
NEUTROPHILS NFR BLD AUTO: 13.43 10*3/MM3 (ref 1.7–7)
NEUTROPHILS NFR BLD AUTO: 84.1 % (ref 42.7–76)
NEUTROPHILS NFR BLD AUTO: 87.1 % (ref 42.7–76)
NEUTROPHILS NFR BLD AUTO: 9.88 10*3/MM3 (ref 1.7–7)
NOTE: ABNORMAL
NRBC BLD AUTO-RTO: 0 /100 WBC (ref 0–0.2)
NRBC BLD AUTO-RTO: 0 /100 WBC (ref 0–0.2)
NT-PROBNP SERPL-MCNC: ABNORMAL PG/ML (ref 0–1800)
OXYHGB MFR BLDV: 93.1 % (ref 94–99)
PAW @ PEAK INSP FLOW SETTING VENT: 0 CMH2O
PCO2 BLDA: 38.6 MM HG (ref 35–45)
PCO2 TEMP ADJ BLD: 38.6 MM HG (ref 35–48)
PH BLDA: 7.42 PH UNITS (ref 7.35–7.45)
PH, TEMP CORRECTED: 7.42 PH UNITS
PLATELET # BLD AUTO: 250 10*3/MM3 (ref 140–450)
PLATELET # BLD AUTO: 252 10*3/MM3 (ref 140–450)
PMV BLD AUTO: 11.6 FL (ref 6–12)
PMV BLD AUTO: 11.7 FL (ref 6–12)
PO2 BLDA: 67.1 MM HG (ref 83–108)
PO2 TEMP ADJ BLD: 67.1 MM HG (ref 83–108)
POTASSIUM SERPL-SCNC: 4.1 MMOL/L (ref 3.5–5.2)
PROTHROMBIN TIME: 13.6 SECONDS (ref 11.4–14.4)
QT INTERVAL: 456 MS
QT INTERVAL: 460 MS
QTC INTERVAL: 522 MS
QTC INTERVAL: 530 MS
RBC # BLD AUTO: 3.72 10*6/MM3 (ref 4.14–5.8)
RBC # BLD AUTO: 4.01 10*6/MM3 (ref 4.14–5.8)
SODIUM SERPL-SCNC: 145 MMOL/L (ref 136–145)
TOTAL RATE: 0 BREATHS/MINUTE
TROPONIN T SERPL-MCNC: 0.15 NG/ML (ref 0–0.03)
UFH PPP CHRO-ACNC: 0.1 IU/ML (ref 0.3–0.7)
UFH PPP CHRO-ACNC: 0.1 IU/ML (ref 0.3–0.7)
WBC NRBC COR # BLD: 11.75 10*3/MM3 (ref 3.4–10.8)
WBC NRBC COR # BLD: 15.41 10*3/MM3 (ref 3.4–10.8)

## 2022-08-11 PROCEDURE — 83605 ASSAY OF LACTIC ACID: CPT | Performed by: NURSE PRACTITIONER

## 2022-08-11 PROCEDURE — 85025 COMPLETE CBC W/AUTO DIFF WBC: CPT | Performed by: NURSE PRACTITIONER

## 2022-08-11 PROCEDURE — 36600 WITHDRAWAL OF ARTERIAL BLOOD: CPT

## 2022-08-11 PROCEDURE — 85520 HEPARIN ASSAY: CPT

## 2022-08-11 PROCEDURE — 99024 POSTOP FOLLOW-UP VISIT: CPT | Performed by: THORACIC SURGERY (CARDIOTHORACIC VASCULAR SURGERY)

## 2022-08-11 PROCEDURE — 85610 PROTHROMBIN TIME: CPT | Performed by: NURSE PRACTITIONER

## 2022-08-11 PROCEDURE — 83735 ASSAY OF MAGNESIUM: CPT | Performed by: NURSE PRACTITIONER

## 2022-08-11 PROCEDURE — 82375 ASSAY CARBOXYHB QUANT: CPT

## 2022-08-11 PROCEDURE — 25010000002 HEPARIN (PORCINE) PER 1000 UNITS

## 2022-08-11 PROCEDURE — 83050 HGB METHEMOGLOBIN QUAN: CPT

## 2022-08-11 PROCEDURE — 97535 SELF CARE MNGMENT TRAINING: CPT

## 2022-08-11 PROCEDURE — 85520 HEPARIN ASSAY: CPT | Performed by: NURSE PRACTITIONER

## 2022-08-11 PROCEDURE — 94799 UNLISTED PULMONARY SVC/PX: CPT

## 2022-08-11 PROCEDURE — 25010000002 HEPARIN (PORCINE) 25000-0.45 UT/250ML-% SOLUTION: Performed by: NURSE PRACTITIONER

## 2022-08-11 PROCEDURE — 85730 THROMBOPLASTIN TIME PARTIAL: CPT | Performed by: NURSE PRACTITIONER

## 2022-08-11 PROCEDURE — 97110 THERAPEUTIC EXERCISES: CPT

## 2022-08-11 PROCEDURE — 25010000002 SULFUR HEXAFLUORIDE MICROSPH 60.7-25 MG RECONSTITUTED SUSPENSION: Performed by: PHYSICIAN ASSISTANT

## 2022-08-11 PROCEDURE — 63710000001 INSULIN LISPRO (HUMAN) PER 5 UNITS: Performed by: PHYSICIAN ASSISTANT

## 2022-08-11 PROCEDURE — 25010000002 FUROSEMIDE PER 20 MG: Performed by: PHYSICIAN ASSISTANT

## 2022-08-11 PROCEDURE — 93306 TTE W/DOPPLER COMPLETE: CPT

## 2022-08-11 PROCEDURE — 97530 THERAPEUTIC ACTIVITIES: CPT

## 2022-08-11 PROCEDURE — 93005 ELECTROCARDIOGRAM TRACING: CPT | Performed by: NURSE PRACTITIONER

## 2022-08-11 PROCEDURE — 74018 RADEX ABDOMEN 1 VIEW: CPT

## 2022-08-11 PROCEDURE — 84484 ASSAY OF TROPONIN QUANT: CPT | Performed by: NURSE PRACTITIONER

## 2022-08-11 PROCEDURE — 83880 ASSAY OF NATRIURETIC PEPTIDE: CPT | Performed by: NURSE PRACTITIONER

## 2022-08-11 PROCEDURE — 25010000002 FUROSEMIDE PER 20 MG: Performed by: NURSE PRACTITIONER

## 2022-08-11 PROCEDURE — 80048 BASIC METABOLIC PNL TOTAL CA: CPT | Performed by: INTERNAL MEDICINE

## 2022-08-11 PROCEDURE — 99233 SBSQ HOSP IP/OBS HIGH 50: CPT | Performed by: INTERNAL MEDICINE

## 2022-08-11 PROCEDURE — 82962 GLUCOSE BLOOD TEST: CPT

## 2022-08-11 PROCEDURE — 25010000002 AMPICILLIN-SULBACTAM PER 1.5 G: Performed by: INTERNAL MEDICINE

## 2022-08-11 PROCEDURE — 82805 BLOOD GASES W/O2 SATURATION: CPT

## 2022-08-11 RX ORDER — BISACODYL 10 MG
10 SUPPOSITORY, RECTAL RECTAL ONCE
Status: COMPLETED | OUTPATIENT
Start: 2022-08-11 | End: 2022-08-11

## 2022-08-11 RX ORDER — FUROSEMIDE 10 MG/ML
60 INJECTION INTRAMUSCULAR; INTRAVENOUS ONCE
Status: COMPLETED | OUTPATIENT
Start: 2022-08-11 | End: 2022-08-11

## 2022-08-11 RX ORDER — HEPARIN SODIUM 1000 [USP'U]/ML
4000 INJECTION, SOLUTION INTRAVENOUS; SUBCUTANEOUS AS NEEDED
Status: DISCONTINUED | OUTPATIENT
Start: 2022-08-11 | End: 2022-08-11

## 2022-08-11 RX ORDER — HEPARIN SODIUM 1000 [USP'U]/ML
2000 INJECTION, SOLUTION INTRAVENOUS; SUBCUTANEOUS AS NEEDED
Status: DISCONTINUED | OUTPATIENT
Start: 2022-08-11 | End: 2022-08-11

## 2022-08-11 RX ORDER — FUROSEMIDE 10 MG/ML
40 INJECTION INTRAMUSCULAR; INTRAVENOUS ONCE
Status: COMPLETED | OUTPATIENT
Start: 2022-08-11 | End: 2022-08-11

## 2022-08-11 RX ORDER — HEPARIN SODIUM 1000 [USP'U]/ML
4000 INJECTION, SOLUTION INTRAVENOUS; SUBCUTANEOUS ONCE
Status: COMPLETED | OUTPATIENT
Start: 2022-08-11 | End: 2022-08-11

## 2022-08-11 RX ORDER — HEPARIN SODIUM 10000 [USP'U]/100ML
13 INJECTION, SOLUTION INTRAVENOUS
Status: DISCONTINUED | OUTPATIENT
Start: 2022-08-11 | End: 2022-08-11

## 2022-08-11 RX ADMIN — Medication 10 ML: at 21:39

## 2022-08-11 RX ADMIN — HEPARIN SODIUM 10 UNITS/KG/HR: 10000 INJECTION, SOLUTION INTRAVENOUS at 00:51

## 2022-08-11 RX ADMIN — SODIUM CHLORIDE 3 G: 900 INJECTION INTRAVENOUS at 08:28

## 2022-08-11 RX ADMIN — SULFUR HEXAFLUORIDE 3 ML: KIT at 16:03

## 2022-08-11 RX ADMIN — SODIUM CHLORIDE 3 G: 900 INJECTION INTRAVENOUS at 15:55

## 2022-08-11 RX ADMIN — ASPIRIN 81 MG CHEWABLE TABLET 81 MG: 81 TABLET CHEWABLE at 08:27

## 2022-08-11 RX ADMIN — INSULIN LISPRO 5 UNITS: 100 INJECTION, SOLUTION INTRAVENOUS; SUBCUTANEOUS at 17:46

## 2022-08-11 RX ADMIN — HEPARIN SODIUM 4000 UNITS: 1000 INJECTION, SOLUTION INTRAVENOUS; SUBCUTANEOUS at 08:18

## 2022-08-11 RX ADMIN — AMLODIPINE BESYLATE 10 MG: 10 TABLET ORAL at 08:27

## 2022-08-11 RX ADMIN — LEVOTHYROXINE SODIUM 50 MCG: 50 TABLET ORAL at 05:36

## 2022-08-11 RX ADMIN — FUROSEMIDE 40 MG: 10 INJECTION, SOLUTION INTRAMUSCULAR; INTRAVENOUS at 14:16

## 2022-08-11 RX ADMIN — METOPROLOL SUCCINATE 50 MG: 50 TABLET, EXTENDED RELEASE ORAL at 08:27

## 2022-08-11 RX ADMIN — INSULIN LISPRO 2 UNITS: 100 INJECTION, SOLUTION INTRAVENOUS; SUBCUTANEOUS at 17:45

## 2022-08-11 RX ADMIN — CETIRIZINE HYDROCHLORIDE 5 MG: 10 TABLET, FILM COATED ORAL at 08:27

## 2022-08-11 RX ADMIN — SODIUM CHLORIDE 3 G: 900 INJECTION INTRAVENOUS at 00:52

## 2022-08-11 RX ADMIN — Medication 10 ML: at 08:26

## 2022-08-11 RX ADMIN — POLYETHYLENE GLYCOL 3350 17 G: 17 POWDER, FOR SOLUTION ORAL at 08:27

## 2022-08-11 RX ADMIN — BISACODYL 10 MG: 10 SUPPOSITORY RECTAL at 05:36

## 2022-08-11 RX ADMIN — Medication 5000 UNITS: at 08:28

## 2022-08-11 RX ADMIN — FUROSEMIDE 60 MG: 10 INJECTION INTRAMUSCULAR; INTRAVENOUS at 00:51

## 2022-08-11 RX ADMIN — INSULIN LISPRO 2 UNITS: 100 INJECTION, SOLUTION INTRAVENOUS; SUBCUTANEOUS at 08:27

## 2022-08-11 NOTE — PROGRESS NOTES
Cardiothoracic Surgery Progress Note      POD #: 9-left great toe transmetatarsal amputation with primary closure     LOS: 15 days      Subjective: On nasal CPAP this morning.  Wife at the bedside.  Very concerned about his pulmonary status.  I am also.    Objective:  Vital Signs vital signs below noted T-max past 24 hours 98.8 °F  Temp:  [97.8 °F (36.6 °C)-98.8 °F (37.1 °C)] 98.8 °F (37.1 °C)  Heart Rate:  [72-91] 89  Resp:  [16-24] 20  BP: (137-159)/(59-98) 138/70    Physical Exam:   General Appearance: Asleep this morning on CPAP nasal did not awaken him   Lungs:   Heart:   Skin:   Incision: Amputation site dressing change.  Suture intact skin margin viable skin flaps are viable.     Results: Laboratory results below noted white blood cell count 15,400 hematocrit 35.7% creatinine 1.82 BUN 43  Results from last 7 days   Lab Units 08/11/22  0023   WBC 10*3/mm3 15.41*   HEMOGLOBIN g/dL 11.8*   HEMATOCRIT % 35.7*   PLATELETS 10*3/mm3 252     Results from last 7 days   Lab Units 08/10/22  1638   SODIUM mmol/L 145   POTASSIUM mmol/L 4.5   CHLORIDE mmol/L 108*   CO2 mmol/L 22.0   BUN mg/dL 43*   CREATININE mg/dL 1.82*   GLUCOSE mg/dL 236*   CALCIUM mg/dL 8.7   Portable chest x-ray radiology report-probable bilateral pulmonary edema.      Assessment: #1.  Postop day 9 left great toe transmetatarsal amputation primary closure  2.  Status post remote coronary bypass grafting  3.  Status post pacemaker placement for sick sinus syndrome  4.  Recent fall left shoulder dislocation reduced in the emergency department  5.  Postop endovascular stenting angioplasty right lower extremity per Dr. Hernandez in 2019  #6.  Acute pulmonary edema    Plan: Overall medical manage per hospitalist.  Antibiotics per infectious disease.  We will continue to change dressing to the amputation site daily.      Gopal Márquez MD - 08/11/22 - 05:53 EDT

## 2022-08-11 NOTE — PROGRESS NOTES
Jermaine JIMENES Francisco  1945  [id number]    Date of Consult: 8/11/2022    Admission Date: 7/27/2022      Requesting Provider: No ref. provider found  Evaluating Physician: Sage Braga MD    Reason for Consultation: Evaluation of toe wound    History of present illness:    Patient is a 77 y.o. male with coronary disease history of CABG diabetes mellitus type 2 hypothyroidism hypertension hyperlipidemia prevascular disease presents the emergency room after having a fall and injuring left shoulder patient tripped while walking his dog.  Coincidentally patient wears an orthotic shoe as recommended by a podiatrist has been on oral doxycycline for a left toe wound we are asked to evaluate this patient to start on broad-spectrum antibiotics occluding vancomycin and Zosyn.  X-ray of left foot showed soft tissue swelling in the forefoot without obvious fracture or osteomyelitis.    Has history of pacemaker    History of vascular stent placed in right leg.    7/29/2022; is having bone scan today denies fevers rash sore throat or diarrhea    7/30/22:  Bone scan compatible with osteomyelitis distal phalanx of first digit, acute fracture 2nd digit.  Afebrile. Blood cultures no growth to date.     7/31/22:  Afebrile.   Dr. Márquez consulting Dr. Apodaca for vascular studies left lower extremity.   No n/v/d.  Complains of shoulder pain.     8/2/22; doing well; had surgery today; toe amputation, no fever, rash, sore throat    8/3/22; no events overnight; resting quietly no events overnight    8/4/22; doing well; no events overnight; no fever, rash, sore throat;   8/5/22; no events overnight; no fever, rash, sore throat, no diarrhea    8/8/22; no events overnight; no fever, rash, sore throat    8/9/22; no events overnight; no fever, rash, sore throat no diarrhea  8/11/22; patient on hfnc, followed by cardiology being worked up for elevated troponin, pobnp.  Resting quietly    Past Medical History:   Diagnosis Date   •  Asthma    • Coronary artery disease    • Diabetes mellitus (HCC) 2000    started on inuslin 12/2018; started on po meds in 2000; checking blood sugars daily    • Disease of thyroid gland     po meds daily for hypothyroidism    • History of fracture as a child     rt leg- severe    • Hyperlipidemia    • Hypertension    • Hypothyroidism    • Peripheral neuropathy    • Peripheral vascular disease (HCC)     s/p angiogram 2/19-needs stent in left leg    • RBBB    • Right knee pain    • Vitamin D deficiency        Past Surgical History:   Procedure Laterality Date   • APPENDECTOMY     • CARDIAC CATHETERIZATION N/A 2/14/2019    Procedure: Left Heart Cath;  Surgeon: Cooper Apodaca MD;  Location:  [facility] CATH INVASIVE LOCATION;  Service: Cardiology   • CARDIAC ELECTROPHYSIOLOGY PROCEDURE N/A 5/18/2022    Procedure: DEVICE IMPLANT;  Surgeon: Cooper Apodaca MD;  Location:  [facility] CATH INVASIVE LOCATION;  Service: Cardiology;  Laterality: N/A;   • COLONOSCOPY     • CORONARY ARTERY BYPASS GRAFT N/A 3/22/2019    Procedure: MEDIAN STERNOTOMY, CORONARY ARTERY BYPASS GRAFT X3, UTILIZING THE LEFT INTERNAL MAMMARY ARTERY, EVH AND OPEN HARVEST OF THE RIGHT GREATER SAPHENOUS VEIN, EXPLORATION OF THE LEFT LEG;  Surgeon: Ap Au MD;  Location:  [facility] OR;  Service: Cardiothoracic   • EYE SURGERY Bilateral     cataracts    • INTERVENTIONAL RADIOLOGY PROCEDURE N/A 7/29/2021    Procedure: Abdominal Aortagram with Runoff;  Surgeon: Jermaine Hernandez MD;  Location:  [facility] CATH INVASIVE LOCATION;  Service: Cardiovascular;  Laterality: N/A;   • KNEE ARTHROSCOPY      right x 2, left x 1   • LACERATION REPAIR      right leg   • LEG SURGERY      2 for fracture of rt leg    • TONSILLECTOMY      Adnoidectomy   • TRANS METATARSAL AMPUTATION Left 8/2/2022    Procedure: GREAT TOE AMPUTATION LEFT;  Surgeon: Gopal Márquez MD;  Location:  [facility] OR;  Service: Vascular;  Laterality: Left;       Family History   Problem  Relation Age of Onset   • Coronary artery disease Mother    • Diabetes Mother    • Cancer Father        Social History     Socioeconomic History   • Marital status:    • Number of children: 2   Tobacco Use   • Smoking status: Never Smoker   • Smokeless tobacco: Never Used   Substance and Sexual Activity   • Alcohol use: No   • Drug use: No   • Sexual activity: Defer       No Known Allergies      Medication:    Current Facility-Administered Medications:   •  acetaminophen (TYLENOL) tablet 650 mg, 650 mg, Oral, Q4H PRN, 650 mg at 08/06/22 2110 **OR** acetaminophen (TYLENOL) 160 MG/5ML solution 650 mg, 650 mg, Oral, Q4H PRN **OR** acetaminophen (TYLENOL) suppository 650 mg, 650 mg, Rectal, Q4H PRN, Nishant Allan PA  •  albuterol (PROVENTIL) nebulizer solution 0.083% 2.5 mg/3mL, 2.5 mg, Nebulization, Q6H PRN, Rajani Farr, DO  •  amLODIPine (NORVASC) tablet 10 mg, 10 mg, Oral, Daily, Nishant Allan PA, 10 mg at 08/11/22 0827  •  ampicillin-sulbactam 3 g/100 mL 0.9% NS IVPB, 3 g, Intravenous, Q8H, Sage Braga MD, Last Rate: 0 mL/hr at 08/10/22 0133, 3 g at 08/11/22 0828  •  aspirin chewable tablet 81 mg, 81 mg, Oral, Daily, Nishant Allan PA, 81 mg at 08/11/22 0827  •  bisacodyl (DULCOLAX) suppository 10 mg, 10 mg, Rectal, Daily PRN, Nishant Allan PA, 10 mg at 08/09/22 0913  •  cetirizine (zyrTEC) tablet 5 mg, 5 mg, Oral, Daily, Nishant Allan PA, 5 mg at 08/11/22 0827  •  dextrose (D50W) (25 g/50 mL) IV injection 25 g, 25 g, Intravenous, Q15 Min PRN, Nishant Allan PA  •  dextrose (GLUTOSE) oral gel 15 g, 15 g, Oral, Q15 Min PRN, Nishant Allan PA  •  furosemide (LASIX) injection 40 mg, 40 mg, Intravenous, Once, Case, RANULFO Parra  •  glucagon (human recombinant) (GLUCAGEN DIAGNOSTIC) injection 1 mg, 1 mg, Intramuscular, Q15 Min PRN, Nishant Allan PA  •  HYDROcodone-acetaminophen (NORCO) 5-325 MG per tablet 1 tablet, 1 tablet, Oral, Q4H PRN, Peter Braga DO, 1 tablet at  08/09/22 1326  •  insulin detemir (LEVEMIR) injection 30 Units, 30 Units, Subcutaneous, Nightly, Melissa Nguyen PA-C, 30 Units at 08/10/22 2016  •  Insulin Lispro (humaLOG) injection 0-7 Units, 0-7 Units, Subcutaneous, TID AC, Melissa Nguyen PA-C, 2 Units at 08/11/22 0827  •  Insulin Lispro (humaLOG) injection 5 Units, 5 Units, Subcutaneous, TID With Meals, Melissa Nguyen PA-C, 5 Units at 08/09/22 1728  •  levothyroxine (SYNTHROID, LEVOTHROID) tablet 50 mcg, 50 mcg, Oral, Q AM, Nishant Allan PA, 50 mcg at 08/11/22 0536  •  metoprolol succinate XL (TOPROL-XL) 24 hr tablet 50 mg, 50 mg, Oral, Q24H, Nishant Allan PA, 50 mg at 08/11/22 0827  •  ondansetron (ZOFRAN) tablet 4 mg, 4 mg, Oral, Q6H PRN **OR** ondansetron (ZOFRAN) injection 4 mg, 4 mg, Intravenous, Q6H PRN, Nishant Allan PA, 4 mg at 08/10/22 1607  •  phenol (CHLORASEPTIC) 1.4 % liquid 1 spray, 1 spray, Mouth/Throat, Q2H PRN, Megan Shukla PA, 1 spray at 08/04/22 1610  •  polyethylene glycol (MIRALAX) packet 17 g, 17 g, Oral, Daily, Rajani Farr DO, 17 g at 08/11/22 0827  •  prochlorperazine (COMPAZINE) injection 5 mg, 5 mg, Intravenous, Q6H PRN, Rajani Farr DO, 5 mg at 08/10/22 2039  •  sennosides-docusate (PERICOLACE) 8.6-50 MG per tablet 2 tablet, 2 tablet, Oral, BID PRN, Nishant Allan PA, 2 tablet at 08/09/22 1402  •  [COMPLETED] Insert peripheral IV, , , Once **AND** sodium chloride 0.9 % flush 10 mL, 10 mL, Intravenous, PRN, Nishant Allan PA  •  sodium chloride 0.9 % flush 10 mL, 10 mL, Intravenous, Q12H, Nishant Allan PA, 10 mL at 08/10/22 2017  •  sodium chloride 0.9 % flush 10 mL, 10 mL, Intravenous, PRN, Nishant Allan PA  •  sodium chloride 0.9 % flush 10 mL, 10 mL, Intravenous, Q12H, Rajani Farr DO, 10 mL at 08/11/22 0826  •  sodium chloride 0.9 % flush 10 mL, 10 mL, Intravenous, PRN, Rajani Farr DO, 10 mL at 08/10/22 1416  •  vitamin D3 capsule 5,000 Units, 5,000 Units,  Oral, Daily, Nishant Allan PA, 5,000 Units at 08/11/22 0828    Facility-Administered Medications Ordered in Other Encounters:   •  Chlorhexidine Gluconate Cloth 2 % pads 1 application, 1 application, Topical, Q12H PRN, Mohan Arauz PA    Antibiotics:  Anti-Infectives (From admission, onward)    Ordered     Dose/Rate Route Frequency Start Stop    08/08/22 1445  ampicillin-sulbactam 3 g/100 mL 0.9% NS IVPB        Ordering Provider: Sage Braga MD    3 g Intravenous Every 8 Hours 08/08/22 1600 08/15/22 1559    08/04/22 1550  cefepime (MAXIPIME) 2 g/100 mL 0.9% NS (mbp)        Ordering Provider: Sage Braga MD    2 g  200 mL/hr over 30 Minutes Intravenous Once 08/04/22 1645 08/04/22 1640    08/02/22 1111  vancomycin 1500 mg/500 mL 0.9% NS IVPB ([facility])        Ordering Provider: Nishant Allan PA    15 mg/kg × 98 kg Intravenous Once 08/02/22 2200 08/02/22 2130    08/01/22 1209  vancomycin in dextrose 5% 150 mL (VANCOCIN) IVPB 750 mg        Ordering Provider: Miguel Bray RPH    750 mg Intravenous Every 12 Hours 08/01/22 2200 08/02/22 0934    08/01/22 1209  vancomycin in dextrose 5% 150 mL (VANCOCIN) IVPB 750 mg        Ordering Provider: Miguel Bray RPH    750 mg  over 60 Minutes Intravenous Every 12 Hours 08/01/22 1300 08/01/22 1422    07/28/22 0014  piperacillin-tazobactam (ZOSYN) 3.375 g in iso-osmotic dextrose 50 ml (premix)        Ordering Provider: Eliana Hightower APRN    3.375 g  over 4 Hours Intravenous Every 8 Hours 07/28/22 0800 08/02/22 0303    07/27/22 2208  piperacillin-tazobactam (ZOSYN) 3.375 g in iso-osmotic dextrose 50 ml (premix)        Ordering Provider: Donyn Hightower APRN    3.375 g  over 30 Minutes Intravenous Once 07/27/22 2210 07/28/22 0320    07/27/22 2208  vancomycin 2000 mg/500 mL 0.9% NS IVPB ([facility])        Ordering Provider: Donny Hightower APRN    20 mg/kg × 98 kg Intravenous Once 07/27/22 2210 07/28/22 0040            Review of Systems:  See  HPI      Physical Exam:   Vital Signs  Temp (24hrs), Av.4 °F (36.9 °C), Min:97.8 °F (36.6 °C), Max:98.8 °F (37.1 °C)    Temp  Min: 97.8 °F (36.6 °C)  Max: 98.8 °F (37.1 °C)  BP  Min: 136/54  Max: 159/59  Pulse  Min: 77  Max: 91  Resp  Min: 16  Max: 24  SpO2  Min: 80 %  Max: 99 %    GENERAL: resting quietly  in no acute distress.   HEENT: Normocephalic, atraumatic.  PERRL. EOMI. No conjunctival injection. No icterus.   HEART: RRR; No murmur,  LUNGS: Clear to auscultation bilaterally   ABDOMEN: Soft, nontender,   :  Without Fonseca catheter.  MSK: Left foot wrapped  SKIN: Warm and dry without cutaneous eruptions on Inspection/palpation.        Laboratory Data    Results from last 7 days   Lab Units 22  0636 22  0023 08/10/22  1638   WBC 10*3/mm3 11.75* 15.41* 15.79*   HEMOGLOBIN g/dL 11.0* 11.8* 11.7*   HEMATOCRIT % 33.7* 35.7* 36.2*   PLATELETS 10*3/mm3 250 252 309     Results from last 7 days   Lab Units 22  0636   SODIUM mmol/L 145   POTASSIUM mmol/L 4.1   CHLORIDE mmol/L 106   CO2 mmol/L 26.0   BUN mg/dL 46*   CREATININE mg/dL 1.80*   GLUCOSE mg/dL 185*   CALCIUM mg/dL 8.9                 Results from last 7 days   Lab Units 22  0636   LACTATE mmol/L 1.2             Estimated Creatinine Clearance: 47.6 mL/min (A) (by C-G formula based on SCr of 1.8 mg/dL (H)).      Microbiology:  No results found for: ACANTHNAEG, AFBCX, BPERTUSSISCX, BLOODCX  No results found for: BCIDPCR, CXREFLEX, CSFCX, CULTURETIS  No results found for: CULTURES, HSVCX, URCX  No results found for: EYECULTURE, GCCX, HSVCULTURE, LABHSV  No results found for: LEGIONELLA, MRSACX, MUMPSCX, MYCOPLASCX  No results found for: NOCARDIACX, STOOLCX  No results found for: THROATCX, UNSTIMCULT, URINECX, CULTURE, VZVCULTUR  No results found for: VIRALCULTU, WOUNDCX        Radiology:  Imaging Results (Last 72 Hours)     Procedure Component Value Units Date/Time    XR Chest 1 View [[id number]] Collected: 08/10/22 1642     Updated:  08/10/22 1646    Narrative:      XR CHEST 1 VW-     Date of Exam: 8/10/2022 4:26 PM     Indication: shortness of breath; S43.005A-Unspecified dislocation of  left shoulder joint, initial encounter; L03.116-Cellulitis of left lower  limb; Z78.9-Other specified health status.     Comparison Exams: August 10, 2022     Technique: Single AP chest radiograph     FINDINGS:  Median sternotomy wires appear intact. A left subclavian pacemaker is in  place. There are coarse bilateral reticular markings. The heart is  enlarged. A right-sided PICC has its tip at the cavoatrial junction.       Impression:      1.  Coarse bilateral reticular markings, which could reflect  interstitial pulmonary edema or atypical pneumonia.  2.  Cardiomegaly.     This report was finalized on 8/10/2022 4:43 PM by Jf Cadet MD.       XR Chest 1 View [[id number]] Collected: 08/10/22 1321     Updated: 08/10/22 1325    Narrative:      DATE OF EXAM:  8/10/2022 1:13 PM     PROCEDURE:  XR CHEST 1 VW-     INDICATIONS:  verify PICC; S43.005A-Unspecified dislocation of left shoulder joint,  initial encounter; L03.116-Cellulitis of left lower limb; Z78.9-Other  specified health status       COMPARISON:  AP portable chest 5/18/2022.     TECHNIQUE:   Single radiographic view of the chest was obtained.     FINDINGS:  Stable cardiac enlargement with CABG. Pacemaker device appears  unchanged. Right arm approach PICC has been placed terminating at the  level of the mid SVC. There is no visible pneumothorax. Faint  interstitial thickening has developed throughout both lungs, which may  represent mild edema. No focal dense consolidations are identified.  There is no pleural effusion. No acute osseous abnormalities.       Impression:         1. Right arm approach PICC tip placement to the mid SVC level. No  visible pneumothorax.  2. Mild diffuse interstitial thickening in both lungs may represent mild  edema.  3. Stable cardiomegaly.     This report was finalized  on 8/10/2022 1:22 PM by Praveena Pérez MD.       XR Abdomen KUB [[id number]] Collected: 08/09/22 1441     Updated: 08/09/22 1445    Narrative:      XR ABDOMEN KUB-     Date of Exam: 8/9/2022 2:25 PM     Indication: vomiting constipation; S43.005A-Unspecified dislocation of  left shoulder joint, initial encounter; L03.116-Cellulitis of left lower  limb; Z78.9-Other specified health status.     Comparison Exams: None available.     Technique: Single AP radiograph of the abdomen     FINDINGS:  There is a nonobstructive bowel gas pattern. A large amount of stool is  present throughout the colon. No pathological calcifications are  identified. The osseous structures appear intact.       Impression:      No acute process identified. Large amount of stool throughout colon.     This report was finalized on 8/9/2022 2:42 PM by Jf Cadet MD.           Estimated Creatinine Clearance: 47.6 mL/min (A) (by C-G formula based on SCr of 1.8 mg/dL (H)).      Impression:   Leukocytosis with neutrophilia, improved   Lactic acidosis  Left great toe wound- osteomyelitis by bone scan- amputation recommended by Dr. Márquez   Status post fall  Diabetes mellitus type 2  Left shoulder dislocation  Probable peripheral vascular disease  Acute renal failure    PLAN/RECOMMENDATIONS:           cont unasyn 3 g iv q8h    Cont iv abx x 6 weeks form 8/2/22 (9/13/22)  Patient has improvement  D/w family    D/w hospitalist   picc placed  Anticipate placement at [facility]  Sage Braga MD  8/11/2022  10:46 EDT

## 2022-08-11 NOTE — PROGRESS NOTES
[facility] Medicine Services  PROGRESS NOTE    Patient Name: Jermaine Singh  : 1945  MRN: [id number]    Date of Admission: 2022  Primary Care Physician: Farooq Painter MD    Subjective   Subjective     CC:  F/u osteomyelitis    HPI:  Persistent n/v overnight/RN removed bipap 2nd to emesis, troponin elevate, was given heparin infusion and lasix 60mg IV x1.    States that he feels better today.  Breathing better.  Denies CP.  Reports h/o CABG about 4-5 years ago.  Notes very small BM yesterday, had dulcolax suppository this AM.      ROS:  Gen- No fevers, chills  CV- No chest pain, palpitations  Resp- No cough, +dyspnea  GI- +nausea/vomiting, no abd pain    Objective   Objective     Vital Signs:   Temp:  [97.8 °F (36.6 °C)-98.8 °F (37.1 °C)] 98.8 °F (37.1 °C)  Heart Rate:  [72-91] 89  Resp:  [16-24] 20  BP: (137-159)/(59-98) 138/70  Flow (L/min):  [3-40] 40     Physical Exam:  Constitutional: No acute distress, awake but drowsy/fell asleep while talking with me, in bed, looks ill, daughter at bedside  HENT: NCAT, mucous membranes moist  Respiratory: Clear to auscultation bilaterally, respiratory effort normal, on 40% HFNC  Cardiovascular: RRR, no murmurs, rubs, or gallops  Gastrointestinal: soft, nontender, distended, +BS  Musculoskeletal: No bilateral ankle edema, LLE in boot  Psychiatric: Appropriate affect, cooperative  Neurologic: Oriented x 3, strength symmetric in all extremities, Cranial Nerves grossly intact to confrontation, speech clear  Skin: No rashes        Results Reviewed:  LAB RESULTS:      Lab 22  0636 22  0023 08/10/22  1638 22  1222 22  0618   WBC 11.75* 15.41* 15.79* 9.19 9.74   HEMOGLOBIN 11.0* 11.8* 11.7* 12.1* 12.1*   HEMATOCRIT 33.7* 35.7* 36.2* 35.5* 35.3*   PLATELETS 250 252 309 240 229   NEUTROS ABS 9.88* 13.43*  --   --   --    IMMATURE GRANS (ABS) 0.06* 0.07*  --   --   --    LYMPHS ABS 0.76 0.60*  --   --   --    MONOS ABS  1.01* 1.26*  --   --   --    EOS ABS 0.00 0.00  --   --   --    MCV 90.6 89.0 90.5 87.2 86.9   PROCALCITONIN  --   --  0.17  --   --    PROTIME  --  13.6  --   --   --    APTT  --  33.1*  --   --   --    HEPARIN ANTI-XA 0.10* 0.10*  --   --   --          Lab 08/10/22  1638 08/10/22  0607 08/09/22  0913 08/08/22  0708 08/07/22  1532   SODIUM 145 145 145 137 139   POTASSIUM 4.5 4.3 4.2 4.2 4.3   CHLORIDE 108* 110* 111* 106 110*   CO2 22.0 23.0 22.0 20.0* 19.0*   ANION GAP 15.0 12.0 12.0 11.0 10.0   BUN 43* 42* 39* 35* 37*   CREATININE 1.82* 1.86* 1.81* 1.70* 1.81*   EGFR 37.8* 36.8* 38.0* 41.0* 38.0*   GLUCOSE 236* 115* 130* 243* 131*   CALCIUM 8.7 8.5* 9.2 8.6 8.5*   MAGNESIUM 2.5*  --   --   --   --              Lab 08/11/22  0636 08/11/22  0023 08/10/22  2239 08/10/22  1638   PROBNP  --   --   --  21,299.0*   TROPONIN T 0.146*  --  0.129* 0.108*   PROTIME  --  13.6  --   --    INR  --  1.05  --   --                  Lab 08/11/22  0041 08/10/22  1741 08/10/22  1633   PH, ARTERIAL 7.422 7.382 7.262*   PCO2, ARTERIAL 38.6 40.3 55.7*   PO2 ART 67.1* 106.0 50.7*   FIO2 32 60 80   HCO3 ART 25.2 23.9 25.1   BASE EXCESS ART 0.8 -1.1* -2.6*   CARBOXYHEMOGLOBIN 0.9 1.0 1.4     Brief Urine Lab Results  (Last result in the past 365 days)      Color   Clarity   Blood   Leuk Est   Nitrite   Protein   CREAT   Urine HCG        02/27/22 1321 Dark Yellow   Clear   Negative   Negative   Negative   2+                 Microbiology Results Abnormal     Procedure Component Value - Date/Time    Anaerobic Culture - Tissue, Toe, Left [[id number]] Collected: 08/02/22 1027    Lab Status: Final result Specimen: Tissue from Toe, Left Updated: 08/07/22 0858     Anaerobic Culture No anaerobes isolated at 5 days    Blood Culture - Blood, Hand, Left [[id number]]  (Normal) Collected: 07/27/22 2040    Lab Status: Final result Specimen: Blood from Hand, Left Updated: 08/01/22 2132     Blood Culture No growth at 5 days    Blood Culture - Blood, Hand,  Right [[id number]]  (Normal) Collected: 07/27/22 2030    Lab Status: Final result Specimen: Blood from Hand, Right Updated: 08/01/22 2132     Blood Culture No growth at 5 days    COVID PRE-OP / PRE-PROCEDURE SCREENING ORDER (NO ISOLATION) - Swab, Nasopharynx [[id number]]  (Normal) Collected: 07/27/22 2246    Lab Status: Final result Specimen: Swab from Nasopharynx Updated: 07/27/22 2357    Narrative:      The following orders were created for panel order COVID PRE-OP / PRE-PROCEDURE SCREENING ORDER (NO ISOLATION) - Swab, Nasopharynx.  Procedure                               Abnormality         Status                     ---------                               -----------         ------                     COVID-19 and FLU A/B PCR...[[id number]]  Normal              Final result                 Please view results for these tests on the individual orders.    COVID-19 and FLU A/B PCR - Swab, Nasopharynx [[id number]]  (Normal) Collected: 07/27/22 2246    Lab Status: Final result Specimen: Swab from Nasopharynx Updated: 07/27/22 2357     COVID19 Not Detected     Influenza A PCR Not Detected     Influenza B PCR Not Detected    Narrative:      Fact sheet for providers: https://www.fda.gov/media/411219/download    Fact sheet for patients: https://www.fda.gov/media/306751/download    Test performed by PCR.          XR Chest 1 View    Result Date: 8/10/2022  XR CHEST 1 VW-  Date of Exam: 8/10/2022 4:26 PM  Indication: shortness of breath; S43.005A-Unspecified dislocation of left shoulder joint, initial encounter; L03.116-Cellulitis of left lower limb; Z78.9-Other specified health status.  Comparison Exams: August 10, 2022  Technique: Single AP chest radiograph  FINDINGS: Median sternotomy wires appear intact. A left subclavian pacemaker is in place. There are coarse bilateral reticular markings. The heart is enlarged. A right-sided PICC has its tip at the cavoatrial junction.      Impression: 1.  Coarse bilateral reticular  markings, which could reflect interstitial pulmonary edema or atypical pneumonia. 2.  Cardiomegaly.  This report was finalized on 8/10/2022 4:43 PM by Jf Cadet MD.      XR Chest 1 View    Result Date: 8/10/2022  DATE OF EXAM: 8/10/2022 1:13 PM  PROCEDURE: XR CHEST 1 VW-  INDICATIONS: verify PICC; S43.005A-Unspecified dislocation of left shoulder joint, initial encounter; L03.116-Cellulitis of left lower limb; Z78.9-Other specified health status   COMPARISON: AP portable chest 5/18/2022.  TECHNIQUE: Single radiographic view of the chest was obtained.  FINDINGS: Stable cardiac enlargement with CABG. Pacemaker device appears unchanged. Right arm approach PICC has been placed terminating at the level of the mid SVC. There is no visible pneumothorax. Faint interstitial thickening has developed throughout both lungs, which may represent mild edema. No focal dense consolidations are identified. There is no pleural effusion. No acute osseous abnormalities.      Impression:  1. Right arm approach PICC tip placement to the mid SVC level. No visible pneumothorax. 2. Mild diffuse interstitial thickening in both lungs may represent mild edema. 3. Stable cardiomegaly.  This report was finalized on 8/10/2022 1:22 PM by Praveena Pérez MD.      XR Abdomen KUB    Result Date: 8/9/2022  XR ABDOMEN KUB-  Date of Exam: 8/9/2022 2:25 PM  Indication: vomiting constipation; S43.005A-Unspecified dislocation of left shoulder joint, initial encounter; L03.116-Cellulitis of left lower limb; Z78.9-Other specified health status.  Comparison Exams: None available.  Technique: Single AP radiograph of the abdomen  FINDINGS: There is a nonobstructive bowel gas pattern. A large amount of stool is present throughout the colon. No pathological calcifications are identified. The osseous structures appear intact.      Impression: No acute process identified. Large amount of stool throughout colon.  This report was finalized on 8/9/2022 2:42 PM by  Jf Cadet MD.            I have reviewed the medications:  Scheduled Meds:amLODIPine, 10 mg, Oral, Daily  ampicillin-sulbactam, 3 g, Intravenous, Q8H  aspirin, 81 mg, Oral, Daily  cetirizine, 5 mg, Oral, Daily  insulin detemir, 30 Units, Subcutaneous, Nightly  insulin lispro, 0-7 Units, Subcutaneous, TID AC  Insulin Lispro, 5 Units, Subcutaneous, TID With Meals  levothyroxine, 50 mcg, Oral, Q AM  metoprolol succinate XL, 50 mg, Oral, Q24H  polyethylene glycol, 17 g, Oral, Daily  sodium chloride, 10 mL, Intravenous, Q12H  sodium chloride, 10 mL, Intravenous, Q12H  vitamin D3, 5,000 Units, Oral, Daily      Continuous Infusions:heparin, 10 Units/kg/hr, Last Rate: 10 Units/kg/hr (08/11/22 0051)  Pharmacy to Dose Heparin,       PRN Meds:.•  acetaminophen **OR** acetaminophen **OR** acetaminophen  •  albuterol  •  bisacodyl  •  dextrose  •  dextrose  •  glucagon (human recombinant)  •  HYDROcodone-acetaminophen  •  ondansetron **OR** ondansetron  •  Pharmacy to Dose Heparin  •  phenol  •  prochlorperazine  •  sennosides-docusate  •  [COMPLETED] Insert peripheral IV **AND** sodium chloride  •  sodium chloride  •  sodium chloride    Assessment & Plan   Assessment & Plan     Active Hospital Problems    Diagnosis  POA   • Proximal phalanx fracture of the second digit extending into the second metatarsal joint [S92.919A]  Unknown   • Dislocation of left shoulder joint, initial encounter [S43.005A]  Yes   • Cellulitis in diabetic foot ([facility]) [E11.628, L03.119]  Yes   • Fall [W19.XXXA]  Yes   • Sepsis ([facility]) [A41.9]  Yes   • Leukocytosis [D72.829]  Yes   • Lactic acidosis [E87.2]  Yes   • Elevated C-reactive protein (CRP) [R79.82]  Yes   • Elevated sed rate (elev SR) [R70.0]  Yes   • SSS (sick sinus syndrome) ([facility]) [I49.5]  Yes   • Hyperlipidemia [E78.5]  Yes   • Hypertension [I10]  Yes   • S/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 Au [Z95.1]  Not Applicable   • Hypothyroidism (acquired) [E03.9]  Yes   • Type 2 diabetes  mellitus ([facility]) [E11.9]  Yes      Resolved Hospital Problems   No resolved problems to display.        Brief Hospital Course to date:  Jermaine Singh is a 77 y.o. male with PMH significant for HTN, HLD, CAD s/p CABG, PVD s/p RLE stent, CKD III, insulin-dependent DMII and hypothyroidism. He was admitted to [facility] 7/27/22 for L shoulder dislocation after a fall as well as sepsis secondary to L great toe osteomyelitis.     L great toe cellulitis / osteomyelitis  Peripheral vascular disease   - Followed by Dr. Joseph of podiatry - was on PO Doxycycline outpatient  - Bone scan concerning for osteomyelitis of L great toe and he is s/p L great toe amputation by Dr. Márquez 8/2/22   - arterial duplex obtained. Cardiology does not recommend revascularization at this time   - tissue culture growing enterococcus faecalis and enterococcus casseliflavus  - ID following - changed to Unasyn, will need 6 weeks from 8/2/22  - will likely need long-term access for IV abx. S/p PICC     Fall   Anterior L shoulder dislocation  - s/p reduction in the ED  - Appreciate ortho (Dr. Gipson) assistance   - Recommend non-operative treatment. Sling for comfort.   - PT for range of motion exercises, avoid Abduction and external rotation. WBAT on LUE with walker or knee scooter  - Follow up with Dr. Gipson in 2-3 weeks     Elevated Troponin  Acute CHF  --cards following, likely demand ischemia d/t acute CHF per cards.  Stopped heparin gtt, plans diuresis and will check echo.      Constipation  Nausea/Vomiting  - KUB with large stool burden otherwise no acute process present  - IVF, zofran and compazine PRN  - aggressive bowel regimen  - given dulcolax suppository this AM, repeat KUB     Insulin-dependent DMII  - Hgb A1c 9.0%  - Continue Levemir 30 units QHS and Lispro to 5 units TID AC + SSI      [place] - worsening  - Baseline creatinine appears to be 0.9-1.2  - IVF was restarted yesterday but ?flash pulmonary edema over night so IVF  stopped and diuresis today.  Will repeat BMP in AM.       Hypertension  Coronary artery disease s/p CABG  SSS s/p PPM   - Cardiology has stopped ACEI and added Amlodipine 10mg daily   - Continue Metoprolol XL 50mg daily   - scheduled for 12/28/2022 1:30 pm with device check and should keep that appt.     Hypothyroid  - Continue levothyroxine    [facility]   Expected Discharge Date: 8/13    DVT prophylaxis:  Medical and mechanical DVT prophylaxis orders are present.     AM-PAC 6 Clicks Score (PT): 16 (08/10/22 2000)     Updated daughter at bedside on 8/11/2022    CODE STATUS:   Code Status and Medical Interventions:   Ordered at: 07/28/22 0000     Code Status (Patient has no pulse and is not breathing):    CPR (Attempt to Resuscitate)     Medical Interventions (Patient has pulse or is breathing):    Full Support       Reilly Thomas MD  08/11/22

## 2022-08-11 NOTE — THERAPY TREATMENT NOTE
Patient Name: Jermaine Singh  : 1945    MRN: [id number]                              Today's Date: 2022       Admit Date: 2022    Visit Dx:     ICD-10-CM ICD-9-CM   1. Dislocation of left shoulder joint, initial encounter  S43.005A 831.00   2. Cellulitis of left foot  L03.116 682.7   3. 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  Preserved LV function   • Type 2 diabetes mellitus ([facility])   • Hypertension   • Hyperlipidemia   • Hypothyroidism (acquired)   • Vitamin D deficiency on Rx    • Serum Cr 1.32 on admission.  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 Au   • Dizziness   • Atherosclerosis of native artery of both lower extremities with intermittent claudication ([facility])   • SSS (sick sinus syndrome) ([facility])   • Dislocation of left shoulder joint, initial encounter   • Cellulitis in diabetic foot ([facility])   • Fall   • Sepsis ([facility])   • Leukocytosis   • Lactic acidosis   • Elevated C-reactive protein (CRP)   • Elevated sed rate (elev SR)   • Proximal phalanx fracture of the second digit extending into the second metatarsal joint     Past Medical History:   Diagnosis Date   • Asthma    • Coronary artery disease    • Diabetes mellitus ([facility])     started on [place] 2018; started on po meds in ; checking blood sugars daily    • Disease of thyroid gland     po meds daily for hypothyroidism    • History of fracture as a child     rt leg- severe    • Hyperlipidemia    • Hypertension    • Hypothyroidism    • Peripheral neuropathy    • Peripheral vascular disease ([facility])     s/p angiogram -needs stent in left leg    • RBBB    • Right knee pain    • Vitamin D deficiency      Past Surgical History:   Procedure Laterality Date   • APPENDECTOMY     • CARDIAC CATHETERIZATION N/A 2019    Procedure: Left Heart Cath;  Surgeon: Paulie  Cooper TRACEY MD;  Location:  [facility]IVE LOCATION;  Service: Cardiology   • CARDIAC ELECTROPHYSIOLOGY PROCEDURE N/A 5/18/2022    Procedure: DEVICE IMPLANT;  Surgeon: Cooper Apodaca MD;  Location:  [facility]IVE LOCATION;  Service: Cardiology;  Laterality: N/A;   • COLONOSCOPY     • CORONARY ARTERY BYPASS GRAFT N/A 3/22/2019    Procedure: MEDIAN STERNOTOMY, CORONARY ARTERY BYPASS GRAFT X3, UTILIZING THE LEFT INTERNAL MAMMARY ARTERY, EVH AND OPEN HARVEST OF THE RIGHT GREATER SAPHENOUS VEIN, EXPLORATION OF THE LEFT LEG;  Surgeon: Ap Au MD;  Location:  [facility];  Service: Cardiothoracic   • EYE SURGERY Bilateral     cataracts    • INTERVENTIONAL RADIOLOGY PROCEDURE N/A 7/29/2021    Procedure: Abdominal Aortagram with Runoff;  Surgeon: Jermaine Hernandez MD;  Location:  [facility]IVE LOCATION;  Service: Cardiovascular;  Laterality: N/A;   • KNEE ARTHROSCOPY      right x 2, left x 1   • LACERATION REPAIR      right leg   • LEG SURGERY      2 for fracture of rt leg    • TONSILLECTOMY      Adnoidectomy   • TRANS METATARSAL AMPUTATION Left 8/2/2022    Procedure: GREAT TOE AMPUTATION LEFT;  Surgeon: Gopal Márquez MD;  Location:  [facility];  Service: Vascular;  Laterality: Left;      General Information     Row Name 08/11/22 1320          Physical Therapy Time and Intention    Document Type therapy note (daily note)  -LR     Mode of Treatment physical therapy  -LR     Row Name 08/11/22 1320          General Information    Patient Profile Reviewed yes  -LR     Existing Precautions/Restrictions fall;left;non-weight bearing;other (see comments)  NWB L LE; Recent L UE anterior dislocation. NO ER or ABDuction of L UE. LUE WBAT  -LR     Barriers to Rehab medically complex;previous functional deficit;cognitive status;impaired sensation  -LR     Row Name 08/11/22 1320          Cognition    Orientation Status (Cognition) oriented x 3  -LR     Row Name 08/11/22 1320          Safety Issues,  Functional Mobility    Safety Issues Affecting Function (Mobility) safety precautions follow-through/compliance;safety precaution awareness;insight into deficits/self-awareness;awareness of need for assistance;unable to maintain weight-bearing restrictions;other (see comments);sequencing abilities;positioning of assistive device;ability to follow commands  -LR     Impairments Affecting Function (Mobility) strength;balance;cognition;pain;coordination;endurance/activity tolerance;postural/trunk control;range of motion (ROM);motor control;sensation/sensory awareness;shortness of breath  -LR           User Key  (r) = Recorded By, (t) = Taken By, (c) = Cosigned By    Initials Name Provider Type    LR Rukhsana Renee, PT Physical Therapist               Mobility     Row Name 08/11/22 1320          Bed Mobility    Bed Mobility rolling left;rolling right  -LR     Rolling Left [place] (Bed Mobility) verbal cues;minimum assist (75% patient effort)  -LR     Rolling Right [place] (Bed Mobility) verbal cues;minimum assist (75% patient effort)  -LR     Supine-Sit [place] (Bed Mobility) not tested  -LR     Sit-Supine [place] (Bed Mobility) not tested  -LR     Comment, (Bed Mobility) Verbal cues for correct technique for rolling side to side for placement of lift sling. Deferred t/f to chair d/t patient's inability to maintain NWB status on L when up.  -LR     Row Name 08/11/22 1320          Bed-Chair Transfer    Bed-Chair [place] (Transfers) verbal cues;dependent (less than 25% patient effort);2 person assist  -LR     Assistive Device (Bed-Chair Transfers) lift device  -LR     Row Name 08/11/22 1320          Sit-Stand Transfer    Sit-Stand [place] (Transfers) verbal cues;2 person assist;moderate assist (50% patient effort)  -LR     Assistive Device (Sit-Stand Transfers) walker, front-wheeled  -LR     Comment, (Sit-Stand Transfer) Verbal cues for correct hand placement with t/f and to step L LE  out before t/f for comfort. Despite repeated cues for NWB on L LE, patient bore weight on L LE with t/f. Cues to return to sitting if unable to offload L LE. Patient able to offload L LE briefly in standing. Stood x2, less length of static standing on second stand d/t fatigue.  -LR     Row Name 08/11/22 1320          Gait/Stairs (Locomotion)    [place] Level (Gait) not tested  -LR     [place] Level (Stairs) not tested  -LR     Comment, (Gait/Stairs) Patient unable to maintain NWB status of L LE to attempt ambulation.  -LR     Row Name 08/11/22 1320          Mobility    Extremity Weight-bearing Status left upper extremity;left lower extremity  -LR     Left Upper Extremity (Weight-bearing Status) weight-bearing as tolerated (WBAT)  -LR     Left Lower Extremity (Weight-bearing Status) non weight-bearing (NWB)   -LR           User Key  (r) = Recorded By, (t) = Taken By, (c) = Cosigned By    Initials Name Provider Type    Rukhsana Monge, PT Physical Therapist               Obj/Interventions     Row Name 08/11/22 1320          Motor Skills    Therapeutic Exercise ankle;knee;hip;other (see comments)  cues for technique; no assist required, cues for improved control and to not let L foot plop down to floor  -     Row Name 08/11/22 1320          Hip (Therapeutic Exercise)    Hip (Therapeutic Exercise) strengthening exercise;isometric exercises  -     Hip Isometrics (Therapeutic Exercise) bilateral;gluteal sets;sitting;10 repetitions  -LR     Hip Strengthening (Therapeutic Exercise) bilateral;heel slides;aBduction;marching while seated;sitting;10 repetitions  -     Row Name 08/11/22 1320          Knee (Therapeutic Exercise)    Knee (Therapeutic Exercise) strengthening exercise;isometric exercises  -     Knee Isometrics (Therapeutic Exercise) bilateral;quad sets;sitting;10 repetitions  -     Knee Strengthening (Therapeutic Exercise) bilateral;SLR (straight leg raise);LAQ (long arc  quad);sitting;10 repetitions  -LR     Row Name 08/11/22 1320          Ankle (Therapeutic Exercise)    Ankle (Therapeutic Exercise) AROM (active range of motion)  -LR     Ankle AROM (Therapeutic Exercise) bilateral;dorsiflexion;plantarflexion;sitting;10 repetitions  -LR     Row Name 08/11/22 1320          Balance    Balance Assessment sitting static balance;sitting dynamic balance;standing static balance  -LR     Static Sitting Balance standby assist  -LR     Dynamic Sitting Balance contact guard  -LR     Position, Sitting Balance unsupported;sitting in chair  -LR     Static Standing Balance minimal assist;2-person assist  -LR     Position/Device Used, Standing Balance supported;walker, rolling  -LR           User Key  (r) = Recorded By, (t) = Taken By, (c) = Cosigned By    Initials Name Provider Type    LR Rukhsana Renee, PT Physical Therapist               Goals/Plan     Row Name 08/11/22 1320          Bed Mobility Goal 1 (PT)    Activity/Assistive Device (Bed Mobility Goal 1, PT) sit to supine/supine to sit  -LR     [place] Level/Cues Needed (Bed Mobility Goal 1, PT) independent  -LR     Time Frame (Bed Mobility Goal 1, PT) long term goal (LTG);10 days  -LR     Progress/Outcomes (Bed Mobility Goal 1, PT) goal ongoing  -LR     Row Name 08/11/22 1320          Transfer Goal 1 (PT)    Activity/Assistive Device (Transfer Goal 1, PT) sit-to-stand/stand-to-sit;bed-to-chair/chair-to-bed;walker, rolling  -LR     [place] Level/Cues Needed (Transfer Goal 1, PT) contact guard required  -LR     Time Frame (Transfer Goal 1, PT) long term goal (LTG);10 days  -LR     Progress/Outcome (Transfer Goal 1, PT) progress slower than expected;goal ongoing  -LR     Row Name 08/11/22 1320          Gait Training Goal 1 (PT)    Activity/Assistive Device (Gait Training Goal 1, PT) gait (walking locomotion);assistive device use;walker, rolling  -LR     [place] Level (Gait Training Goal 1, PT) standby assist  -LR      Distance (Gait Training Goal 1, PT) 500 feet  -LR     Time Frame (Gait Training Goal 1, PT) long term goal (LTG);10 days  -LR     Progress/Outcome (Gait Training Goal 1, PT) goal no longer appropriate  -LR     Row Name 08/11/22 1320          Stairs Goal 1 (PT)    Activity/Assistive Device (Stairs Goal 1, PT) stairs, all skills  -LR     [place] Level/Cues Needed (Stairs Goal 1, PT) standby assist  -LR     Number of Stairs (Stairs Goal 1, PT) 4  -LR     Time Frame (Stairs Goal 1, PT) long term goal (LTG);10 days  -LR     Progress/Outcome (Stairs Goal 1, PT) goal no longer appropriate  -LR           User Key  (r) = Recorded By, (t) = Taken By, (c) = Cosigned By    Initials Name Provider Type    LR Rukhsana Renee, PT Physical Therapist               Clinical Impression     Row Name 08/11/22 1320          Pain    Pretreatment Pain Rating 0/10 - no pain  -LR     Posttreatment Pain Rating 0/10 - no pain  -LR     Pain Intervention(s) Ambulation/increased activity;Repositioned  -LR     Row Name 08/11/22 1320          Plan of Care Review    Plan of Care Reviewed With patient  -LR     Progress improving  -LR     Outcome Evaluation Patient stood from chair with mod assist x2 and RW, limited by fatigue. Patient continues to have difficulty maintaining NWB on L LE during t/f. Fatigued quickly today. Recommend IRF at d/c. Will continue to progress as able.  -LR     Row Name 08/11/22 1320          Therapy Assessment/Plan (PT)    Rehab Potential (PT) fair, will monitor progress closely  -LR     Criteria for Skilled Interventions Met (PT) yes;meets criteria;skilled treatment is necessary  -LR     Therapy Frequency (PT) daily  -LR     Row Name 08/11/22 1320          Positioning and Restraints    Pre-Treatment Position in bed  -LR     Post Treatment Position chair  -LR     In Chair notified nsg;reclined;sitting;call light within reach;encouraged to call for assist;exit alarm on;legs elevated;waffle cushion;on mechanical  lift sling  -LR           User Key  (r) = Recorded By, (t) = Taken By, (c) = Cosigned By    Initials Name Provider Type    LR Rukhsana Renee, PT Physical Therapist               Outcome Measures     Row Name 08/11/22 1320 08/11/22 0815       How much help from another person do you currently need...    Turning from your back to your side while in flat bed without using bedrails? 3  -LR 3  -OD    Moving from lying on back to sitting on the side of a flat bed without bedrails? 3  -LR 3  -OD    Moving to and from a bed to a chair (including a wheelchair)? 2  -LR 3  -OD    Standing up from a chair using your arms (e.g., wheelchair, bedside chair)? 2  -LR 3  -OD    Climbing 3-5 steps with a railing? 1  -LR 2  -OD    To walk in hospital room? 1  -LR 2  -OD    AM-PAC 6 Clicks Score (PT) 12  -LR 16  -OD    Highest level of mobility 4 --> Transferred to chair/commode  -LR 5 --> Static standing  -OD    Row Name 08/11/22 1410 08/11/22 1320       Functional Assessment    Outcome Measure Options AM-PAC 6 Clicks Daily Activity (OT)  -HK AM-PAC 6 Clicks Basic Mobility (PT)  -LR          User Key  (r) = Recorded By, (t) = Taken By, (c) = Cosigned By    Initials Name Provider Type    Rukhsana Monge, PT Physical Therapist    Thelma Phelps, OT Occupational Therapist    Luz Elena Cedeño, RN Registered Nurse                             Physical Therapy Education                 Title: PT OT SLP Therapies (Done)     Topic: Physical Therapy (Done)     Point: Mobility training (Done)     Learning Progress Summary           Patient Acceptance, E,D, VU,NR by LR at 8/11/2022 1320    Comment: Educated on NWB status of L UE, safety with mobility, LE HEP, correct rolling technique, correct sit<->stand t/f technique, and progression of POC.      Show all documentation for this point (5)                 Point: Home exercise program (Done)     Learning Progress Summary           Patient Acceptance, E,D, VU,NR by LR at  8/11/2022 1320    Comment: Educated on NWB status of L UE, safety with mobility, LE HEP, correct rolling technique, correct sit<->stand t/f technique, and progression of POC.      Show all documentation for this point (5)                 Point: Body mechanics (Done)     Learning Progress Summary           Patient Acceptance, E,D, VU,NR by LR at 8/11/2022 1320    Comment: Educated on NWB status of L UE, safety with mobility, LE HEP, correct rolling technique, correct sit<->stand t/f technique, and progression of POC.      Show all documentation for this point (5)                 Point: Precautions (Done)     Learning Progress Summary           Patient Acceptance, E,D, VU,NR by LR at 8/11/2022 1320    Comment: Educated on NWB status of L UE, safety with mobility, LE HEP, correct rolling technique, correct sit<->stand t/f technique, and progression of POC.      Show all documentation for this point (5)                             User Key     Initials Effective Dates Name Provider Type Discipline    LR 06/16/21 -  Rukhsana Renee, PT Physical Therapist PT              PT Recommendation and Plan     Plan of Care Reviewed With: patient  Progress: improving  Outcome Evaluation: Patient stood from chair with mod assist x2 and RW, limited by fatigue. Patient continues to have difficulty maintaining NWB on L LE during t/f. Fatigued quickly today. Recommend IRF at d/c. Will continue to progress as able.     Time Calculation:    PT Charges     Row Name 08/11/22 1320             Time Calculation    Start Time 1320  -LR      PT Received On 08/11/22  -LR      PT Goal Re-Cert Due Date 08/14/22  -LR              Timed Charges    46214 - PT Therapeutic Exercise Minutes 8  -LR      69036 - PT Therapeutic Activity Minutes 18  -LR              Total Minutes    Timed Charges Total Minutes 26  -LR       Total Minutes 26  -LR            User Key  (r) = Recorded By, (t) = Taken By, (c) = Cosigned By    Initials Name Provider Type     LR Rukhsana Renee, PT Physical Therapist              Therapy Charges for Today     Code Description Service Date Service Provider Modifiers Qty    86941510027 HC PT THER PROC EA 15 MIN 8/11/2022 Rukhsana Renee, PT GP 1    [id number]  PT THERAPEUTIC ACT EA 15 MIN 8/11/2022 Rukhsana Renee, PT GP 1          PT G-Codes  Outcome Measure Options: AM-PAC 6 Clicks Daily Activity (OT)  AM-PAC 6 Clicks Score (PT): 12  AM-PAC 6 Clicks Score (OT): 15    Rukhsana Renee, PT  8/11/2022

## 2022-08-11 NOTE — PLAN OF CARE
Goal Outcome Evaluation:  Plan of Care Reviewed With: patient        Progress: improving  Outcome Evaluation: Pt rolled in bed to L to R for sling placement and dependent completion of valeriano care post bowel movement. Pt dependent for bed to chair transfer via lift. Completed sit to stand from chair with modAx2 and RW. Max verbal cues for maintaining NWB L LE. Pt with difficult maintaining during transition of transfer. Pt completed AAROM L shoulder x 10 reps and elbow/wrist/hand x10 reps. Requires rest breaks throughout session due to fatigue. Recommend d/c to IPR.

## 2022-08-11 NOTE — PLAN OF CARE
Problem: Adult Inpatient Plan of Care  Goal: Plan of Care Review  Recent Flowsheet Documentation  Taken 8/11/2022 1320 by Rukhsana Renee, PT  Progress: improving  Plan of Care Reviewed With: patient  Outcome Evaluation: Patient stood from chair with mod assist x2 and RW, limited by fatigue. Patient continues to have difficulty maintaining NWB on L LE during t/f. Fatigued quickly today. Recommend IRF at d/c. Will continue to progress as able.   Goal Outcome Evaluation:  Plan of Care Reviewed With: patient        Progress: improving  Outcome Evaluation: Patient stood from chair with mod assist x2 and RW, limited by fatigue. Patient continues to have difficulty maintaining NWB on L LE during t/f. Fatigued quickly today. Recommend IRF at d/c. Will continue to progress as able.

## 2022-08-11 NOTE — THERAPY TREATMENT NOTE
Patient Name: Jermaine Singh  : 1945    MRN: [id number]                              Today's Date: 2022       Admit Date: 2022    Visit Dx:     ICD-10-CM ICD-9-CM   1. Dislocation of left shoulder joint, initial encounter  S43.005A 831.00   2. Cellulitis of left foot  L03.116 682.7   3. 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  Preserved LV function   • Type 2 diabetes mellitus ([facility])   • Hypertension   • Hyperlipidemia   • Hypothyroidism (acquired)   • Vitamin D deficiency on Rx    • Serum Cr 1.32 on admission.  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 Au   • Dizziness   • Atherosclerosis of native artery of both lower extremities with intermittent claudication ([facility])   • SSS (sick sinus syndrome) ([facility])   • Dislocation of left shoulder joint, initial encounter   • Cellulitis in diabetic foot ([facility])   • Fall   • Sepsis ([facility])   • Leukocytosis   • Lactic acidosis   • Elevated C-reactive protein (CRP)   • Elevated sed rate (elev SR)   • Proximal phalanx fracture of the second digit extending into the second metatarsal joint     Past Medical History:   Diagnosis Date   • Asthma    • Coronary artery disease    • Diabetes mellitus ([facility])     started on [place] 2018; started on po meds in ; checking blood sugars daily    • Disease of thyroid gland     po meds daily for hypothyroidism    • History of fracture as a child     rt leg- severe    • Hyperlipidemia    • Hypertension    • Hypothyroidism    • Peripheral neuropathy    • Peripheral vascular disease ([facility])     s/p angiogram -needs stent in left leg    • RBBB    • Right knee pain    • Vitamin D deficiency      Past Surgical History:   Procedure Laterality Date   • APPENDECTOMY     • CARDIAC CATHETERIZATION N/A 2019    Procedure: Left Heart Cath;  Surgeon: Paulie  Cooper TRACEY MD;  Location:  [facility] CATH INVASIVE LOCATION;  Service: Cardiology   • CARDIAC ELECTROPHYSIOLOGY PROCEDURE N/A 5/18/2022    Procedure: DEVICE IMPLANT;  Surgeon: Cooper Apodaca MD;  Location:  [facility] CATH INVASIVE LOCATION;  Service: Cardiology;  Laterality: N/A;   • COLONOSCOPY     • CORONARY ARTERY BYPASS GRAFT N/A 3/22/2019    Procedure: MEDIAN STERNOTOMY, CORONARY ARTERY BYPASS GRAFT X3, UTILIZING THE LEFT INTERNAL MAMMARY ARTERY, EVH AND OPEN HARVEST OF THE RIGHT GREATER SAPHENOUS VEIN, EXPLORATION OF THE LEFT LEG;  Surgeon: Ap Au MD;  Location:  [facility];  Service: Cardiothoracic   • EYE SURGERY Bilateral     cataracts    • INTERVENTIONAL RADIOLOGY PROCEDURE N/A 7/29/2021    Procedure: Abdominal Aortagram with Runoff;  Surgeon: Jermaine Hernandez MD;  Location:  [facility] CATH INVASIVE LOCATION;  Service: Cardiovascular;  Laterality: N/A;   • KNEE ARTHROSCOPY      right x 2, left x 1   • LACERATION REPAIR      right leg   • LEG SURGERY      2 for fracture of rt leg    • TONSILLECTOMY      Adnoidectomy   • TRANS METATARSAL AMPUTATION Left 8/2/2022    Procedure: GREAT TOE AMPUTATION LEFT;  Surgeon: Gopal Márquez MD;  Location:  [facility];  Service: Vascular;  Laterality: Left;      General Information     Row Name 08/11/22 1342          OT Time and Intention    Document Type therapy note (daily note)  -     Mode of Treatment occupational therapy  -     Row Name 08/11/22 3390          General Information    Patient Profile Reviewed yes  -HK     Existing Precautions/Restrictions fall;left;non-weight bearing;other (see comments)  NWB L LE; Recent L UE anterior dislocation. NO ER or ABDuction of L UE. LUE WBAT  -HK     Barriers to Rehab previous functional deficit;medically complex;cognitive status;impaired sensation  -     Row Name 08/11/22 1344          Cognition    Orientation Status (Cognition) oriented x 3  -     Row Name 08/11/22 1341          Safety Issues, Functional  Mobility    Safety Issues Affecting Function (Mobility) safety precautions follow-through/compliance  -     Impairments Affecting Function (Mobility) balance;cognition;endurance/activity tolerance;pain;strength;range of motion (ROM);postural/trunk control  -     Cognitive Impairments, Mobility Safety/Performance attention;awareness, need for assistance;insight into deficits/self-awareness;problem-solving/reasoning;safety precaution follow-through;judgment;safety precaution awareness  -           User Key  (r) = Recorded By, (t) = Taken By, (c) = Cosigned By    Initials Name Provider Type    HK Thelma Jackson, OT Occupational Therapist                 Mobility/ADL's     Row Name 08/11/22 3757          Bed Mobility    Bed Mobility rolling right;rolling left  -     Rolling Left [place] (Bed Mobility) minimum assist (75% patient effort);1 person assist;verbal cues  -     Rolling Right [place] (Bed Mobility) minimum assist (75% patient effort);1 person assist;verbal cues  -     Comment, (Bed Mobility) Pt rolled L to R with minAx1 for sling placement. Offloading shoe donned on OT entry.  -     Row Name 08/11/22 5227          Transfers    Transfers sit-stand transfer;bed-chair transfer  -     Bed-Chair [place] (Transfers) dependent (less than 25% patient effort);2 person assist  -     Assistive Device (Bed-Chair Transfers) lift device  -     Sit-Stand [place] (Transfers) moderate assist (50% patient effort);2 person assist;verbal cues  -     Row Name 08/11/22 4848          Sit-Stand Transfer    Assistive Device (Sit-Stand Transfers) walker, front-wheeled  -     Comment, (Sit-Stand Transfer) Verbal cues to slide L LE out prior to transfers to maintain NWB status. Pt with difficulty maintaining WB status at times however pt educated on improtance of maintaining.  -     Row Name 08/11/22 1305          Functional Mobility    Functional Mobility- Comment not tested  -     Row Name  08/11/22 1357          Activities of Daily Living    BADL Assessment/Intervention grooming  -     Row Name 08/11/22 1357          Mobility    Extremity Weight-bearing Status left upper extremity;left lower extremity  -     Left Upper Extremity (Weight-bearing Status) weight-bearing as tolerated (WBAT)  -     Left Lower Extremity (Weight-bearing Status) non weight-bearing (NWB)   -     Row Name 08/11/22 1357          Grooming Assessment/Training    [place] Level (Grooming) hair care, combing/brushing;maximum assist (25% patient effort)  -     Position (Grooming) unsupported sitting  -           User Key  (r) = Recorded By, (t) = Taken By, (c) = Cosigned By    Initials Name Provider Type     Thelma Jackson, OT Occupational Therapist               Obj/Interventions     Row Name 08/11/22 1401          Sensory Assessment (Somatosensory)    Sensory Assessment (Somatosensory) sensation intact  -[facility] Name 08/11/22 1401          Vision Assessment/Intervention    Visual Impairment/Limitations WFL  -[facility] Name 08/11/22 1401          Elbow/Forearm (Therapeutic Exercise)    Elbow/Forearm (Therapeutic Exercise) AAROM (active assistive range of motion)  -     Elbow/Forearm AAROM (Therapeutic Exercise) left;flexion;extension;supination;pronation;10 repetitions  -     Row Name 08/11/22 1401          Wrist (Therapeutic Exercise)    Wrist (Therapeutic Exercise) AROM (active range of motion)  -     Wrist AROM (Therapeutic Exercise) left;flexion;extension;10 repetitions  -[facility] Name 08/11/22 1401          Hand (Therapeutic Exercise)    Hand (Therapeutic Exercise) AROM (active range of motion)  -     Hand AROM/AAROM (Therapeutic Exercise) left;finger flexion;finger extension;10 repetitions  -[facility] Name 08/11/22 1401          Motor Skills    Therapeutic Exercise shoulder;elbow/forearm;wrist;hand  -[facility] Name 08/11/22 1401          Balance    Balance Assessment sitting static  balance;standing static balance;sitting dynamic balance  -     Static Sitting Balance supervision  -     Dynamic Sitting Balance contact guard  -     Position, Sitting Balance unsupported;sitting in chair  -     Static Standing Balance minimal assist;2-person assist;verbal cues  -HK     Dynamic Standing Balance moderate assist;2-person assist;verbal cues  -HK     Position/Device Used, Standing Balance supported;walker, rolling  -HK     Balance Interventions sitting;occupation based/functional task  -           User Key  (r) = Recorded By, (t) = Taken By, (c) = Cosigned By    Initials Name Provider Type     Thelma Jackson, OT Occupational Therapist               Goals/Plan    No documentation.                Clinical Impression     Row Name 08/11/22 1403          Pain Assessment    Pretreatment Pain Rating 0/10 - no pain  -     Posttreatment Pain Rating 0/10 - no pain  -     Row Name 08/11/22 1403          Plan of Care Review    Plan of Care Reviewed With patient  -     Progress improving  -     Outcome Evaluation Pt rolled in bed to L to R for sling placement and dependent completion of valeriano care post bowel movement. Pt dependent for bed to chair transfer via lift. Completed sit to stand from chair with modAx2 and RW. Max verbal cues for maintaining NWB L LE. Pt with difficult maintaining during transition of transfer. Pt completed AAROM L shoulder x 10 reps and elbow/wrist/hand x10 reps. Requires rest breaks throughout session due to fatigue. Recommend d/c to IPR.  -     Row Name 08/11/22 1403          Therapy Assessment/Plan (OT)    Patient/Family Therapy Goal Statement (OT) Pt would like to improve and return home.  -     Rehab Potential (OT) good, to achieve stated therapy goals  -     Criteria for Skilled Therapeutic Interventions Met (OT) yes;meets criteria;skilled treatment is necessary  -     Therapy Frequency (OT) daily  -     Row Name 08/11/22 1403          Therapy Plan  Review/Discharge Plan (OT)    Anticipated Discharge Disposition (OT) inpatient rehabilitation facility  -     Row Name 08/11/22 1403          Vital Signs    Pre Systolic BP Rehab --  RN cleared for tx; VSS  -HK     O2 Delivery Pre Treatment hi-flow  -HK     O2 Delivery Intra Treatment hi-flow  -HK     O2 Delivery Post Treatment hi-flow  -HK     Pre Patient Position Supine  -HK     Intra Patient Position Standing  -HK     Post Patient Position Sitting  -HK     Row Name 08/11/22 1403          Positioning and Restraints    Pre-Treatment Position in bed  -HK     Post Treatment Position chair  -HK     In Chair notified nsg;reclined;encouraged to call for assist;call light within reach;exit alarm on;with nsg  -HK           User Key  (r) = Recorded By, (t) = Taken By, (c) = Cosigned By    Initials Name Provider Type     Thelma Jackson, OT Occupational Therapist               Outcome Measures     Row Name 08/11/22 1410          How much help from another is currently needed...    Putting on and taking off regular lower body clothing? 2  -HK     Bathing (including washing, rinsing, and drying) 3  -HK     Toileting (which includes using toilet bed pan or urinal) 2  -HK     Putting on and taking off regular upper body clothing 3  -HK     Taking care of personal grooming (such as brushing teeth) 2  -HK     Eating meals 3  -HK     AM-PAC 6 Clicks Score (OT) 15  -HK     Row Name 08/11/22 0815          How much help from another person do you currently need...    Turning from your back to your side while in flat bed without using bedrails? 3  -OD     Moving from lying on back to sitting on the side of a flat bed without bedrails? 3  -OD     Moving to and from a bed to a chair (including a wheelchair)? 3  -OD     Standing up from a chair using your arms (e.g., wheelchair, bedside chair)? 3  -OD     Climbing 3-5 steps with a railing? 2  -OD     To walk in hospital room? 2  -OD     AM-PAC 6 Clicks Score (PT) 16  -OD     Highest  level of mobility 5 --> Static standing  -OD     Row Name 08/11/22 1410          Functional Assessment    Outcome Measure Options AM-PAC 6 Clicks Daily Activity (OT)  -           User Key  (r) = Recorded By, (t) = Taken By, (c) = Cosigned By    Initials Name Provider Type     Thelma Jackson, OT Occupational Therapist    Luz Elena Cedeño, RN Registered Nurse                Occupational Therapy Education                 Title: PT OT SLP Therapies (Done)     Topic: Occupational Therapy (Done)     Point: ADL training (Done)     Description:   Instruct learner(s) on proper safety adaptation and remediation techniques during self care or transfers.   Instruct in proper use of assistive devices.              Learning Progress Summary           Patient Acceptance, E,TB,D, VU,NR by  at 8/11/2022 1411      Show all documentation for this point (8)                 Point: Home exercise program (Done)     Description:   Instruct learner(s) on appropriate technique for monitoring, assisting and/or progressing therapeutic exercises/activities.              Learning Progress Summary           Patient Acceptance, E,TB,D, VU,NR by  at 8/11/2022 1411      Show all documentation for this point (8)                 Point: Precautions (Done)     Description:   Instruct learner(s) on prescribed precautions during self-care and functional transfers.              Learning Progress Summary           Patient Acceptance, E,TB,D, VU,NR by  at 8/11/2022 1411      Show all documentation for this point (8)                 Point: Body mechanics (Done)     Description:   Instruct learner(s) on proper positioning and spine alignment during self-care, functional mobility activities and/or exercises.              Learning Progress Summary           Patient Acceptance, E,TB,D, VU,NR by  at 8/11/2022 1411      Show all documentation for this point (4)                             User Key     Initials Effective Dates Name Provider Type  Discipline     06/16/21 -  Thelma Jackson OT Occupational Therapist OT              OT Recommendation and Plan  Therapy Frequency (OT): daily  Plan of Care Review  Plan of Care Reviewed With: patient  Progress: improving  Outcome Evaluation: Pt rolled in bed to L to R for sling placement and dependent completion of valeriano care post bowel movement. Pt dependent for bed to chair transfer via lift. Completed sit to stand from chair with modAx2 and RW. Max verbal cues for maintaining NWB L LE. Pt with difficult maintaining during transition of transfer. Pt completed AAROM L shoulder x 10 reps and elbow/wrist/hand x10 reps. Requires rest breaks throughout session due to fatigue. Recommend d/c to IPR.     Time Calculation:    Time Calculation- OT     Row Name 08/11/22 1309             Time Calculation- OT    OT Start Time 1319  -HK      OT Received On 08/11/22  -              Timed Charges    57911 - OT Self Care/Mgmt Minutes 24  -HK              Total Minutes    Timed Charges Total Minutes 24  -HK       Total Minutes 24  -HK            User Key  (r) = Recorded By, (t) = Taken By, (c) = Cosigned By    Initials Name Provider Type     Thelma Jackson OT Occupational Therapist              Therapy Charges for Today     Code Description Service Date Service Provider Modifiers Qty    [id number] HC OT SELF CARE/MGMT/TRAIN EA 15 MIN 8/11/2022 Thelma Jackson OT GO 2               Thelma Jackson OT  8/11/2022

## 2022-08-11 NOTE — PROGRESS NOTES
" [facility] - Progress Note    Jermaine Singh  1945  S383/1    08/11/22,  07:37 EDT      Chief Complaint: Following for PAD, HTN    Subjective:   Asked to re eval pt after last night's events of nausea/emesis/ increased respiratory effort.  No EKG changes.  Troponin drawn - no complaints of chest pain, mildly elevated 0.129 up from 0.108.  Now 0.146 this morning.      Daughter at bedside.  Patient reports no abdominal or chest pain.  Still mildly nauseated but no further emesis.  Last BM yesterday.  Resting in bed with HFO2.      Review of Systems:  Pertinent positives are listed above and in physical exam.  All others have been reviewed and are negative.    amLODIPine, 10 mg, Oral, Daily  ampicillin-sulbactam, 3 g, Intravenous, Q8H  aspirin, 81 mg, Oral, Daily  cetirizine, 5 mg, Oral, Daily  insulin detemir, 30 Units, Subcutaneous, Nightly  insulin lispro, 0-7 Units, Subcutaneous, TID AC  Insulin Lispro, 5 Units, Subcutaneous, TID With Meals  levothyroxine, 50 mcg, Oral, Q AM  metoprolol succinate XL, 50 mg, Oral, Q24H  polyethylene glycol, 17 g, Oral, Daily  sodium chloride, 10 mL, Intravenous, Q12H  sodium chloride, 10 mL, Intravenous, Q12H  vitamin D3, 5,000 Units, Oral, Daily        Objective:  Vitals:   height is 190.5 cm (75\") and weight is 98 kg (216 lb). His axillary temperature is 98.8 °F (37.1 °C). His blood pressure is 138/70 and his pulse is 89. His respiration is 20 and oxygen saturation is 97%.       Intake/Output Summary (Last 24 hours) at 8/11/2022 0737  Last data filed at 8/11/2022 0515  Gross per 24 hour   Intake 1200 ml   Output 1100 ml   Net 100 ml       Physical Exam:  General:  WN, NAD, A and O x3.  CV:  Normal S1,S2. No murmur, rub, or gallop.  Resp:  CTA Soy, equal, nonlabored.  Abd:  Soft, decreased BS, no organomegaly. Mildly tender to palpation.  Extrem:  No edema BLE, 2+ pedal/PT pulses with doppler.  Dressing noted Left foot - clean, intact.         Results from " last 7 days   Lab Units 08/11/22  0636   WBC 10*3/mm3 11.75*   HEMOGLOBIN g/dL 11.0*   HEMATOCRIT % 33.7*   PLATELETS 10*3/mm3 250     Results from last 7 days   Lab Units 08/10/22  1638   SODIUM mmol/L 145   POTASSIUM mmol/L 4.5   CHLORIDE mmol/L 108*   CO2 mmol/L 22.0   BUN mg/dL 43*   CREATININE mg/dL 1.82*   CALCIUM mg/dL 8.7   GLUCOSE mg/dL 236*     Results from last 7 days   Lab Units 08/11/22  0023   INR  1.05             Results from last 7 days   Lab Units 08/10/22  1638   PROBNP pg/mL 21,299.0*       Tele:  Paced.    Assessment and Plan:  1.  Peripheral Vascular Disease              -  Remote Bilateral iliac stenting with significant infrapopliteal disease, good collateral flow.              - He has excellent pulses bilaterally by doppler              -  POD 9 p Left great toe transmetatarsal amputation.   -  Continue PT/OT     2.  CAD              -  Remote CABG              -  Currently asymptomatic.              -  Continue ASA, CCB   -  Emesis/nausea last night with increased respiratory effort.   -  Slight elevation in troponins - demand ischemia with acute CHF.  EKG shows no acute findings, V Paced.  Repeat echo. Diurese.  DC IV heparin.       3.  Osteomyelitis              -  Left toe wound being followed by podiatrist as outpt with ortho shoe/oral antibiotics                     -  Elevated inflammatory markers/leukocytosis upon arrival with XRay suggesting osteo.              -  IV Antibiotics per ID.  Dr. Márquez will continue to follow.      4.  DMII              -  Per primary service.       5.  Essential Hypertension              -  Better controlled              -  Continue home dose Toprol XL 50mg daily.  Trialed low dose Lisinopril with rise in Cr.   Discontinued ACEI.   Increased Norvasc to 10mg daily and continue to monitor.     -   Cr 1.8        6.  Hyperlipidemia              -  Lipid panel reviewed.  , , HDL 39, .              -  Will address statin.     7.  Left  shoulder dislocation              -  S/P fall prompting ED visit/admission.              -  PT/OT         8.  [place]   -  Cr 1.8 today   -  ACEI discontinued.   -  Continue with daily labs.       76 yo CM with known CAD/CABG, PVDz with prior stenting.  Presents after fall resulting in left shoulder dislocation as well as presenting with left toe wound.  Inflammatory markers and WBC elevated, Xray suggests osteomyelitis.  He has known PVDz with severe infrapopliteal disease.  However, he has bilateral collateral flow and good pulses by doppler bilaterally.  At this time, there is no need for revascularization of the LLE and amputation recommended.  Post Op course is stable.  BP better controlled with ACEI but [place] noted.  DC'd ACEI and increased CCB with better control.       Cardiology initially signed off and asked to re evaluate for nausea/emesis with elevated troponins, likely demand ischemia with acute CHF.  Diurese and check a.m. labs.  Will discontinue heparin gtt.  Echo has been ordered - will review.      I discussed the patient's findings and my recommendations with the patient, any present family members, and the nursing staff.  Cooper Apodaca MD saw and examined patient, verified hx and PE, read all radiographic studies, reviewed labs and micro data, and formulated dx, plan for treatment and all medical decision making.      Megan Shukla PA-C  08/11/22, 09:00 EDT

## 2022-08-11 NOTE — PROGRESS NOTES
HEPARIN INFUSION    Jermaine Singh is a 77 y.o. male receiving heparin infusion.    Therapy for (VTE/Cardiac): Cardiac (?ACS)  Patient Weight: 98 kg  Initial Bolus (Y/N): No  Any Bolus (Y/N): Yes    Signs or Symptoms of Bleeding: none noted    Cardiac or Other (Not VTE)   Initial Bolus: 60 units/kg (Max 4,000 units)  Initial rate: 12 units/kg/hr (Max 1,000 units/hr)   Anti Xa Rebolus Infusion Hold time Change infusion Dose (Units/kg/hr) Next Anti Xa or aPTT Level Due   < 0.11 50 Units/kg  (4000 Units Max) None Increase by  3 Units/kg/hr 6 hours   0.11- 0.19 25 Units/kg  (2000 Units Max) None Increase by  2 Units/kg/hr 6 hours   0.2 - 0.29 0 None Increase by  1 Units/kg/hr 6 hours   0.3 - 0.5 0 None No Change 6 hours (after 2 consecutive levels in range check qAM)   0.51 - 0.6 0 None Decrease by  1 Units/kg/hr 6 hours   0.61 - 0.8 0 30 Minutes Decrease by  2 Units/kg/hr 6 hours   0.81 - 1 0 60 Minutes Decrease by  3 Units/kg/hr 6 hours   >1 0 Hold  After Anti Xa less than 0.5 decrease previous rate by  4 Units/kg/hr  Every 2 hours until Anti Xa  less than 0.5 then when infusion restarts in 6 hours     Results from last 7 days   Lab Units 08/10/22  1638 08/06/22  1222 08/05/22  0618   HEMOGLOBIN g/dL 11.7* 12.1* 12.1*   HEMATOCRIT % 36.2* 35.5* 35.3*   PLATELETS 10*3/mm3 309 240 229        Date   Time   Anti-Xa Current Rate (Unit/kg/hr) Bolus   (Units) Rate Change   (Unit/kg/hr) New Rate (Unit/kg/hr) Next   Anti-Xa Comments  Pump Check Daily   8/11 0030 pending New start No initial +10 10 0600 D/w RN   8/11 0636 0.1 10 4000 +3 13 1400 DW Luz Elena, RN [id number]                                                                                                                                                                                                                      Annalisa Hatfield, PharmD  08/11/22  08:04 EDT

## 2022-08-11 NOTE — PROGRESS NOTES
HEPARIN INFUSION    Jermaine Singh is a 77 y.o. male receiving heparin infusion.    Therapy for (VTE/Cardiac): Cardiac  Patient Weight: 98 kg  Initial Bolus (Y/N): No  Any Bolus (Y/N): Yes    Signs or Symptoms of Bleeding: none noted    Cardiac or Other (Not VTE)   Initial Bolus: 60 units/kg (Max 4,000 units)  Initial rate: 12 units/kg/hr (Max 1,000 units/hr)   Anti Xa Rebolus Infusion Hold time Change infusion Dose (Units/kg/hr) Next Anti Xa or aPTT Level Due   < 0.11 50 Units/kg  (4000 Units Max) None Increase by  3 Units/kg/hr 6 hours   0.11- 0.19 25 Units/kg  (2000 Units Max) None Increase by  2 Units/kg/hr 6 hours   0.2 - 0.29 0 None Increase by  1 Units/kg/hr 6 hours   0.3 - 0.5 0 None No Change 6 hours (after 2 consecutive levels in range check qAM)   0.51 - 0.6 0 None Decrease by  1 Units/kg/hr 6 hours   0.61 - 0.8 0 30 Minutes Decrease by  2 Units/kg/hr 6 hours   0.81 - 1 0 60 Minutes Decrease by  3 Units/kg/hr 6 hours   >1 0 Hold  After Anti Xa less than 0.5 decrease previous rate by  4 Units/kg/hr  Every 2 hours until Anti Xa  less than 0.5 then when infusion restarts in 6 hours     Results from last 7 days   Lab Units 08/10/22  1638 08/06/22  1222 08/05/22  0618   HEMOGLOBIN g/dL 11.7* 12.1* 12.1*   HEMATOCRIT % 36.2* 35.5* 35.3*   PLATELETS 10*3/mm3 309 240 229        Date   Time   Anti-Xa Current Rate (Unit/kg/hr) Bolus   (Units) Rate Change   (Unit/kg/hr) New Rate (Unit/kg/hr) Next   Anti-Xa Comments  Pump Check Daily   8/11 0030 pending New start No initial +10 10 0600 D/w RN                                                                                                                                                                                                                                 Lv Garcia, PharmD, BCPS  8/11/2022  01:00 EDT

## 2022-08-11 NOTE — PLAN OF CARE
Problem: Fall Injury Risk  Goal: Absence of Fall and Fall-Related Injury  8/10/2022 2246 by Leanna De La Cruz RN  Outcome: Ongoing, Progressing  8/10/2022 2244 by Leanna De La Cruz RN  Outcome: Ongoing, Progressing     Problem: Skin Injury Risk Increased  Goal: Skin Health and Integrity  8/10/2022 2246 by Leanan De La Cruz RN  Outcome: Ongoing, Progressing  8/10/2022 2244 by Leanna De La Cruz RN  Outcome: Ongoing, Progressing     Problem: Mobility Impairment  Goal: Optimal Mobility  Outcome: Ongoing, Progressing     Problem: Nausea and Vomiting  Goal: Fluid and Electrolyte Balance  8/10/2022 2246 by Laenna De La Cruz RN  Outcome: Ongoing, Progressing  8/10/2022 2244 by Leanna De La Cruz RN  Outcome: Ongoing, Progressing  Intervention: Prevent and Manage Nausea and Vomiting  Recent Flowsheet Documentation  Taken 8/10/2022 2109 by Leanna De La Cruz RN  Nausea/Vomiting Interventions:   see MAR   sips clear liquids given     Problem: Gas Exchange Impaired  Goal: Optimal Gas Exchange  Outcome: Ongoing, Progressing   Goal Outcome Evaluation:

## 2022-08-11 NOTE — SIGNIFICANT NOTE
Pt w/ persistent nausea and vomiting tonight. RN removed BiPAP 2/2 recurrent emesis. Pt on 3 liters NC currently w/ sats of 93-94%. Since BiPAP removed, pt w/ increased respiratory effort. Denies chest pain, dyspnea. Troponin also elevated- previously 0.108 and repeat 0.129; will continue to trend. EKG stable. STAT ABG ordered for now. Case discussed w/ Dr. Archuleta. Heparin infusion started (prophylactic heparin d/c). Lasix 60mg IV x 1 dose now. NPO for now. Cardiology consult in am (last seen by Dr. Apodaca). Will continue to monitor closely.     Addendum:     Constitutional: Awake, alert; ill appearing; diaphoretic   Eyes: PERRLA, sclerae anicteric, no conjunctival injection  HENT: NCAT, mucous membranes moist  Neck: Supple, no thyromegaly, no lymphadenopathy, trachea midline  Respiratory: diminished/course lower lobes w/ faint expr wheeze; mild increase in respiratory effort at rest    Cardiovascular: RRR, no murmurs, rubs, or gallops, trace edema BLE   Gastrointestinal: Positive bowel sounds, soft/full, nontender, nondistended  Musculoskeletal: Normal ROM bilaterally   Psychiatric: Appropriate affect, cooperative  Neurologic: Oriented x 3, strength symmetric in all extremities, Cranial Nerves grossly intact to confrontation, speech clear  Skin: No rashes, lesions or wounds; pale     ABG c/w hypoxia; pt currently on 3 liters; ordering HFNC for now 2/2 ongoing nausea and recent emesis (BiPAP on hold)    VS currently stable; pt c/o shortness of breath and just feeling poorly in general. Also c/o constipation, pt w/ very small BMS despite Miralax. Pt w/ small BM earlier tonight. Suppository ordered. Pt and wife updated on POC. Will continue to monitor closely.

## 2022-08-12 ENCOUNTER — APPOINTMENT (OUTPATIENT)
Dept: NUCLEAR MEDICINE | Facility: HOSPITAL | Age: 77
End: 2022-08-12

## 2022-08-12 ENCOUNTER — APPOINTMENT (OUTPATIENT)
Dept: GENERAL RADIOLOGY | Facility: HOSPITAL | Age: 77
End: 2022-08-12

## 2022-08-12 ENCOUNTER — APPOINTMENT (OUTPATIENT)
Dept: CT IMAGING | Facility: HOSPITAL | Age: 77
End: 2022-08-12

## 2022-08-12 LAB
ANION GAP SERPL CALCULATED.3IONS-SCNC: 17 MMOL/L (ref 5–15)
BH CV ECHO MEAS - AO MAX PG: 5.2 MMHG
BH CV ECHO MEAS - AO MEAN PG: 3.1 MMHG
BH CV ECHO MEAS - AO ROOT DIAM: 3.2 CM
BH CV ECHO MEAS - AO V2 MAX: 114 CM/SEC
BH CV ECHO MEAS - AO V2 VTI: 24.8 CM
BH CV ECHO MEAS - AVA(I,D): 1.85 CM2
BH CV ECHO MEAS - EDV(CUBED): 151.2 ML
BH CV ECHO MEAS - EDV(MOD-SP2): 144 ML
BH CV ECHO MEAS - EDV(MOD-SP4): 175 ML
BH CV ECHO MEAS - EF(MOD-BP): 48.8 %
BH CV ECHO MEAS - EF(MOD-SP2): 43.3 %
BH CV ECHO MEAS - EF(MOD-SP4): 54.6 %
BH CV ECHO MEAS - ESV(CUBED): 46 ML
BH CV ECHO MEAS - ESV(MOD-SP2): 81.7 ML
BH CV ECHO MEAS - ESV(MOD-SP4): 79.5 ML
BH CV ECHO MEAS - FS: 32.7 %
BH CV ECHO MEAS - IVS/LVPW: 1.48 CM
BH CV ECHO MEAS - IVSD: 1.02 CM
BH CV ECHO MEAS - LA DIMENSION: 4.2 CM
BH CV ECHO MEAS - LAT PEAK E' VEL: 7.8 CM/SEC
BH CV ECHO MEAS - LV MASS(C)D: 164.7 GRAMS
BH CV ECHO MEAS - LV MAX PG: 2.27 MMHG
BH CV ECHO MEAS - LV MEAN PG: 1.04 MMHG
BH CV ECHO MEAS - LV V1 MAX: 75.4 CM/SEC
BH CV ECHO MEAS - LV V1 VTI: 17 CM
BH CV ECHO MEAS - LVIDD: 5.3 CM
BH CV ECHO MEAS - LVIDS: 3.6 CM
BH CV ECHO MEAS - LVOT AREA: 2.7 CM2
BH CV ECHO MEAS - LVOT DIAM: 1.85 CM
BH CV ECHO MEAS - LVPWD: 0.69 CM
BH CV ECHO MEAS - MED PEAK E' VEL: 3.9 CM/SEC
BH CV ECHO MEAS - MV A MAX VEL: 93 CM/SEC
BH CV ECHO MEAS - MV DEC SLOPE: 381.2 CM/SEC2
BH CV ECHO MEAS - MV E MAX VEL: 67.3 CM/SEC
BH CV ECHO MEAS - MV E/A: 0.72
BH CV ECHO MEAS - MV MAX PG: 5.8 MMHG
BH CV ECHO MEAS - MV MEAN PG: 2.6 MMHG
BH CV ECHO MEAS - MV P1/2T: 91.8 MSEC
BH CV ECHO MEAS - MV V2 VTI: 33.5 CM
BH CV ECHO MEAS - MVA(P1/2T): 2.4 CM2
BH CV ECHO MEAS - MVA(VTI): 1.37 CM2
BH CV ECHO MEAS - PA ACC TIME: 0.08 SEC
BH CV ECHO MEAS - PA PR(ACCEL): 41 MMHG
BH CV ECHO MEAS - SV(LVOT): 45.9 ML
BH CV ECHO MEAS - SV(MOD-SP2): 62.3 ML
BH CV ECHO MEAS - SV(MOD-SP4): 95.5 ML
BH CV ECHO MEAS - TAPSE (>1.6): 1.55 CM
BH CV ECHO MEASUREMENTS AVERAGE E/E' RATIO: 11.5
BH CV VAS BP LEFT ARM: NORMAL MMHG
BH CV XLRA - RV BASE: 4.8 CM
BH CV XLRA - RV LENGTH: 7.6 CM
BH CV XLRA - RV MID: 4 CM
BH CV XLRA - TDI S': 10.6 CM/SEC
BUN SERPL-MCNC: 47 MG/DL (ref 8–23)
BUN/CREAT SERPL: 25.5 (ref 7–25)
CA-I SERPL ISE-MCNC: 1.22 MMOL/L (ref 1.12–1.32)
CALCIUM SPEC-SCNC: 8.8 MG/DL (ref 8.6–10.5)
CHLORIDE SERPL-SCNC: 103 MMOL/L (ref 98–107)
CO2 SERPL-SCNC: 26 MMOL/L (ref 22–29)
CREAT SERPL-MCNC: 1.84 MG/DL (ref 0.76–1.27)
DEPRECATED RDW RBC AUTO: 44.8 FL (ref 37–54)
EGFRCR SERPLBLD CKD-EPI 2021: 37.3 ML/MIN/1.73
ERYTHROCYTE [DISTWIDTH] IN BLOOD BY AUTOMATED COUNT: 13 % (ref 12.3–15.4)
GLUCOSE BLDC GLUCOMTR-MCNC: 151 MG/DL (ref 70–130)
GLUCOSE BLDC GLUCOMTR-MCNC: 173 MG/DL (ref 70–130)
GLUCOSE BLDC GLUCOMTR-MCNC: 175 MG/DL (ref 70–130)
GLUCOSE BLDC GLUCOMTR-MCNC: 209 MG/DL (ref 70–130)
GLUCOSE BLDC GLUCOMTR-MCNC: 223 MG/DL (ref 70–130)
GLUCOSE SERPL-MCNC: 206 MG/DL (ref 65–99)
HCT VFR BLD AUTO: 35.5 % (ref 37.5–51)
HGB BLD-MCNC: 11.2 G/DL (ref 13–17.7)
LEFT ATRIUM VOLUME INDEX: 28.6 ML/M2
MAGNESIUM SERPL-MCNC: 2.2 MG/DL (ref 1.6–2.4)
MAXIMAL PREDICTED HEART RATE: 143 BPM
MCH RBC QN AUTO: 29.5 PG (ref 26.6–33)
MCHC RBC AUTO-ENTMCNC: 31.5 G/DL (ref 31.5–35.7)
MCV RBC AUTO: 93.4 FL (ref 79–97)
PHOSPHATE SERPL-MCNC: 4.4 MG/DL (ref 2.5–4.5)
PLATELET # BLD AUTO: 236 10*3/MM3 (ref 140–450)
PMV BLD AUTO: 11.8 FL (ref 6–12)
POTASSIUM SERPL-SCNC: 4.2 MMOL/L (ref 3.5–5.2)
RBC # BLD AUTO: 3.8 10*6/MM3 (ref 4.14–5.8)
SODIUM SERPL-SCNC: 146 MMOL/L (ref 136–145)
STRESS TARGET HR: 122 BPM
TROPONIN T SERPL-MCNC: 0.16 NG/ML (ref 0–0.03)
TROPONIN T SERPL-MCNC: 0.21 NG/ML (ref 0–0.03)
WBC NRBC COR # BLD: 13.76 10*3/MM3 (ref 3.4–10.8)

## 2022-08-12 PROCEDURE — 97535 SELF CARE MNGMENT TRAINING: CPT

## 2022-08-12 PROCEDURE — 84484 ASSAY OF TROPONIN QUANT: CPT | Performed by: PHYSICIAN ASSISTANT

## 2022-08-12 PROCEDURE — 85027 COMPLETE CBC AUTOMATED: CPT | Performed by: INTERNAL MEDICINE

## 2022-08-12 PROCEDURE — 63710000001 INSULIN LISPRO (HUMAN) PER 5 UNITS: Performed by: PHYSICIAN ASSISTANT

## 2022-08-12 PROCEDURE — 84100 ASSAY OF PHOSPHORUS: CPT | Performed by: INTERNAL MEDICINE

## 2022-08-12 PROCEDURE — 78580 LUNG PERFUSION IMAGING: CPT

## 2022-08-12 PROCEDURE — A9540 TC99M MAA: HCPCS | Performed by: INTERNAL MEDICINE

## 2022-08-12 PROCEDURE — 71045 X-RAY EXAM CHEST 1 VIEW: CPT

## 2022-08-12 PROCEDURE — 25010000002 AMPICILLIN-SULBACTAM PER 1.5 G: Performed by: INTERNAL MEDICINE

## 2022-08-12 PROCEDURE — 0 TECHNETIUM ALBUMIN AGGREGATED: Performed by: INTERNAL MEDICINE

## 2022-08-12 PROCEDURE — 83735 ASSAY OF MAGNESIUM: CPT | Performed by: INTERNAL MEDICINE

## 2022-08-12 PROCEDURE — 99024 POSTOP FOLLOW-UP VISIT: CPT | Performed by: THORACIC SURGERY (CARDIOTHORACIC VASCULAR SURGERY)

## 2022-08-12 PROCEDURE — 84484 ASSAY OF TROPONIN QUANT: CPT | Performed by: INTERNAL MEDICINE

## 2022-08-12 PROCEDURE — 94799 UNLISTED PULMONARY SVC/PX: CPT

## 2022-08-12 PROCEDURE — 82962 GLUCOSE BLOOD TEST: CPT

## 2022-08-12 PROCEDURE — 97110 THERAPEUTIC EXERCISES: CPT

## 2022-08-12 PROCEDURE — 94640 AIRWAY INHALATION TREATMENT: CPT

## 2022-08-12 PROCEDURE — 99232 SBSQ HOSP IP/OBS MODERATE 35: CPT | Performed by: INTERNAL MEDICINE

## 2022-08-12 PROCEDURE — 93010 ELECTROCARDIOGRAM REPORT: CPT | Performed by: INTERNAL MEDICINE

## 2022-08-12 PROCEDURE — 93005 ELECTROCARDIOGRAM TRACING: CPT | Performed by: INTERNAL MEDICINE

## 2022-08-12 PROCEDURE — 63710000001 INSULIN DETEMIR PER 5 UNITS: Performed by: PHYSICIAN ASSISTANT

## 2022-08-12 PROCEDURE — 25010000002 PROCHLORPERAZINE 10 MG/2ML SOLUTION: Performed by: INTERNAL MEDICINE

## 2022-08-12 PROCEDURE — 25010000002 MORPHINE PER 10 MG: Performed by: INTERNAL MEDICINE

## 2022-08-12 PROCEDURE — 80048 BASIC METABOLIC PNL TOTAL CA: CPT | Performed by: PHYSICIAN ASSISTANT

## 2022-08-12 PROCEDURE — P9612 CATHETERIZE FOR URINE SPEC: HCPCS

## 2022-08-12 PROCEDURE — 25010000002 HEPARIN (PORCINE) PER 1000 UNITS: Performed by: INTERNAL MEDICINE

## 2022-08-12 PROCEDURE — 71250 CT THORAX DX C-: CPT

## 2022-08-12 PROCEDURE — 82330 ASSAY OF CALCIUM: CPT | Performed by: INTERNAL MEDICINE

## 2022-08-12 RX ORDER — HEPARIN SODIUM 5000 [USP'U]/ML
5000 INJECTION, SOLUTION INTRAVENOUS; SUBCUTANEOUS EVERY 8 HOURS SCHEDULED
Status: DISCONTINUED | OUTPATIENT
Start: 2022-08-12 | End: 2022-08-13

## 2022-08-12 RX ORDER — MORPHINE SULFATE 2 MG/ML
2 INJECTION, SOLUTION INTRAMUSCULAR; INTRAVENOUS ONCE
Status: COMPLETED | OUTPATIENT
Start: 2022-08-12 | End: 2022-08-12

## 2022-08-12 RX ORDER — NITROGLYCERIN 0.4 MG/1
0.4 TABLET SUBLINGUAL
Status: DISCONTINUED | OUTPATIENT
Start: 2022-08-12 | End: 2022-08-26 | Stop reason: HOSPADM

## 2022-08-12 RX ORDER — MAGNESIUM CARB/ALUMINUM HYDROX 105-160MG
150 TABLET,CHEWABLE ORAL ONCE
Status: COMPLETED | OUTPATIENT
Start: 2022-08-12 | End: 2022-08-12

## 2022-08-12 RX ORDER — BUMETANIDE 0.25 MG/ML
2 INJECTION INTRAMUSCULAR; INTRAVENOUS ONCE
Status: COMPLETED | OUTPATIENT
Start: 2022-08-12 | End: 2022-08-12

## 2022-08-12 RX ADMIN — SODIUM CHLORIDE 3 G: 900 INJECTION INTRAVENOUS at 00:57

## 2022-08-12 RX ADMIN — ALBUTEROL SULFATE 2.5 MG: 2.5 SOLUTION RESPIRATORY (INHALATION) at 23:41

## 2022-08-12 RX ADMIN — PROCHLORPERAZINE EDISYLATE 5 MG: 5 INJECTION INTRAMUSCULAR; INTRAVENOUS at 13:25

## 2022-08-12 RX ADMIN — INSULIN DETEMIR 30 UNITS: 100 INJECTION, SOLUTION SUBCUTANEOUS at 20:43

## 2022-08-12 RX ADMIN — BISACODYL 10 MG: 10 SUPPOSITORY RECTAL at 20:41

## 2022-08-12 RX ADMIN — INSULIN LISPRO 2 UNITS: 100 INJECTION, SOLUTION INTRAVENOUS; SUBCUTANEOUS at 12:20

## 2022-08-12 RX ADMIN — INSULIN LISPRO 3 UNITS: 100 INJECTION, SOLUTION INTRAVENOUS; SUBCUTANEOUS at 08:29

## 2022-08-12 RX ADMIN — AMLODIPINE BESYLATE 10 MG: 10 TABLET ORAL at 12:09

## 2022-08-12 RX ADMIN — HYDROCODONE BITARTRATE AND ACETAMINOPHEN 1 TABLET: 5; 325 TABLET ORAL at 05:05

## 2022-08-12 RX ADMIN — SODIUM CHLORIDE 3 G: 900 INJECTION INTRAVENOUS at 23:23

## 2022-08-12 RX ADMIN — PROCHLORPERAZINE EDISYLATE 5 MG: 5 INJECTION INTRAMUSCULAR; INTRAVENOUS at 08:10

## 2022-08-12 RX ADMIN — METOPROLOL SUCCINATE 50 MG: 50 TABLET, EXTENDED RELEASE ORAL at 12:09

## 2022-08-12 RX ADMIN — SODIUM CHLORIDE 3 G: 900 INJECTION INTRAVENOUS at 16:53

## 2022-08-12 RX ADMIN — SODIUM CHLORIDE 3 G: 900 INJECTION INTRAVENOUS at 08:05

## 2022-08-12 RX ADMIN — HYDROCODONE BITARTRATE AND ACETAMINOPHEN 1 TABLET: 5; 325 TABLET ORAL at 12:09

## 2022-08-12 RX ADMIN — Medication 10 ML: at 20:43

## 2022-08-12 RX ADMIN — INSULIN LISPRO 5 UNITS: 100 INJECTION, SOLUTION INTRAVENOUS; SUBCUTANEOUS at 18:06

## 2022-08-12 RX ADMIN — Medication 10 ML: at 08:11

## 2022-08-12 RX ADMIN — INSULIN LISPRO 5 UNITS: 100 INJECTION, SOLUTION INTRAVENOUS; SUBCUTANEOUS at 12:20

## 2022-08-12 RX ADMIN — BUMETANIDE 2 MG: 0.25 INJECTION INTRAMUSCULAR; INTRAVENOUS at 10:47

## 2022-08-12 RX ADMIN — HEPARIN SODIUM 5000 UNITS: 5000 INJECTION INTRAVENOUS; SUBCUTANEOUS at 20:42

## 2022-08-12 RX ADMIN — LEVOTHYROXINE SODIUM 50 MCG: 50 TABLET ORAL at 05:59

## 2022-08-12 RX ADMIN — ASPIRIN 81 MG CHEWABLE TABLET 81 MG: 81 TABLET CHEWABLE at 08:24

## 2022-08-12 RX ADMIN — MORPHINE SULFATE 2 MG: 2 INJECTION, SOLUTION INTRAMUSCULAR; INTRAVENOUS at 07:56

## 2022-08-12 RX ADMIN — HYDROCODONE BITARTRATE AND ACETAMINOPHEN 1 TABLET: 5; 325 TABLET ORAL at 18:54

## 2022-08-12 RX ADMIN — KIT FOR THE PREPARATION OF TECHNETIUM TC 99M ALBUMIN AGGREGATED 1 DOSE: 2.5 INJECTION, POWDER, FOR SOLUTION INTRAVENOUS at 15:10

## 2022-08-12 RX ADMIN — INSULIN LISPRO 2 UNITS: 100 INJECTION, SOLUTION INTRAVENOUS; SUBCUTANEOUS at 17:54

## 2022-08-12 RX ADMIN — HEPARIN SODIUM 5000 UNITS: 5000 INJECTION INTRAVENOUS; SUBCUTANEOUS at 13:26

## 2022-08-12 RX ADMIN — Medication 150 ML: at 12:09

## 2022-08-12 NOTE — THERAPY TREATMENT NOTE
Patient Name: Jermaine Singh  : 1945    MRN: [id number]                              Today's Date: 2022       Admit Date: 2022    Visit Dx:     ICD-10-CM ICD-9-CM   1. Dislocation of left shoulder joint, initial encounter  S43.005A 831.00   2. Cellulitis of left foot  L03.116 682.7   3. 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  Preserved LV function   • Type 2 diabetes mellitus ([facility])   • Hypertension   • Hyperlipidemia   • Hypothyroidism (acquired)   • Vitamin D deficiency on Rx    • Serum Cr 1.32 on admission.  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 Au   • Dizziness   • Atherosclerosis of native artery of both lower extremities with intermittent claudication ([facility])   • SSS (sick sinus syndrome) ([facility])   • Dislocation of left shoulder joint, initial encounter   • Cellulitis in diabetic foot ([facility])   • Fall   • Sepsis ([facility])   • Leukocytosis   • Lactic acidosis   • Elevated C-reactive protein (CRP)   • Elevated sed rate (elev SR)   • Proximal phalanx fracture of the second digit extending into the second metatarsal joint     Past Medical History:   Diagnosis Date   • Asthma    • Coronary artery disease    • Diabetes mellitus ([facility])     started on inuslin 2018; started on po meds in ; checking blood sugars daily    • Disease of thyroid gland     po meds daily for hypothyroidism    • History of fracture as a child     rt leg- severe    • Hyperlipidemia    • Hypertension    • Hypothyroidism    • Peripheral neuropathy    • Peripheral vascular disease ([facility])     s/p angiogram -needs stent in left leg    • RBBB    • Right knee pain    • Vitamin D deficiency      Past Surgical History:   Procedure Laterality Date   • APPENDECTOMY     • CARDIAC CATHETERIZATION N/A 2019    Procedure: Left Heart Cath;  Surgeon: Paulie  Cooper TRACEY MD;  Location:  [facility];  Service: Cardiology   • CARDIAC ELECTROPHYSIOLOGY PROCEDURE N/A 5/18/2022    Procedure: DEVICE IMPLANT;  Surgeon: Cooper Apodaca MD;  Location:  [facility];  Service: Cardiology;  Laterality: N/A;   • COLONOSCOPY     • CORONARY ARTERY BYPASS GRAFT N/A 3/22/2019    Procedure: MEDIAN STERNOTOMY, CORONARY ARTERY BYPASS GRAFT X3, UTILIZING THE LEFT INTERNAL MAMMARY ARTERY, EVH AND OPEN HARVEST OF THE RIGHT GREATER SAPHENOUS VEIN, EXPLORATION OF THE LEFT LEG;  Surgeon: Ap Au MD;  Location:  [facility];  Service: Cardiothoracic   • EYE SURGERY Bilateral     cataracts    • INTERVENTIONAL RADIOLOGY PROCEDURE N/A 7/29/2021    Procedure: Abdominal Aortagram with Runoff;  Surgeon: Jermaine Hernandez MD;  Location:  [facility];  Service: Cardiovascular;  Laterality: N/A;   • KNEE ARTHROSCOPY      right x 2, left x 1   • LACERATION REPAIR      right leg   • LEG SURGERY      2 for fracture of rt leg    • TONSILLECTOMY      Adnoidectomy   • TRANS METATARSAL AMPUTATION Left 8/2/2022    Procedure: GREAT TOE AMPUTATION LEFT;  Surgeon: Gopal Márquez MD;  Location:  [facility];  Service: Vascular;  Laterality: Left;      General Information     Row Name 08/12/22 1408          Physical Therapy Time and Intention    Document Type therapy note (daily note)  -     Mode of Treatment physical therapy  -     Row Name 08/12/22 1408          General Information    Patient Profile Reviewed yes  -SS     Existing Precautions/Restrictions fall;left;non-weight bearing;other (see comments);oxygen therapy device and L/min  NWB L LE; Recent LUE anterior dislocation - No ER or ABD LUE, WBAT  -SS     Barriers to Rehab medically complex;previous functional deficit  -     Row Name 08/12/22 1408          Cognition    Orientation Status (Cognition) unable/difficult to assess  -     Row Name 08/12/22 1406          Safety Issues, Functional  Mobility    Safety Issues Affecting Function (Mobility) safety precaution awareness;safety precautions follow-through/compliance;unable to maintain weight-bearing restrictions;insight into deficits/self-awareness  -     Impairments Affecting Function (Mobility) balance;endurance/activity tolerance;pain;strength;range of motion (ROM);postural/trunk control  -     Comment, Safety Issues/Impairments (Mobility) difficulty maintaining NWB on LLE  -SS           User Key  (r) = Recorded By, (t) = Taken By, (c) = Cosigned By    Initials Name Provider Type     Jenna Stanley, PT Physical Therapist               Mobility     Row Name 08/12/22 1411          Bed Mobility    Bed Mobility rolling right;rolling left  -     Rolling Left [place] (Bed Mobility) minimum assist (75% patient effort);verbal cues  -     Rolling Right [place] (Bed Mobility) minimum assist (75% patient effort);verbal cues  -     Comment, (Bed Mobility) VC for sequencing  -[facility] Name 08/12/22 1411          Bed-Chair Transfer    Bed-Chair [place] (Transfers) dependent (less than 25% patient effort);2 person assist  -     Assistive Device (Bed-Chair Transfers) lift device  -     Comment, (Bed-Chair Transfer) utilized lift device as pt. having difficulty w/maintaining NWB on LLE  -SS     [facility] Name 08/12/22 1411          Sit-Stand Transfer    Sit-Stand [place] (Transfers) moderate assist (50% patient effort);2 person assist;verbal cues;nonverbal cues (demo/gesture)  -     Assistive Device (Sit-Stand Transfers) walker, front-wheeled  -     Comment, (Sit-Stand Transfer) V/TC for stepping out LLE to maintain NWB status, hand placement, lowering with eccentric control  -     Row Name 08/12/22 1411          Gait/Stairs (Locomotion)    Comment, (Gait/Stairs) not appropriate this date secondary to difficulty maintaining NWB status  -     Row Name 08/12/22 1411          Mobility    Extremity Weight-bearing Status left  upper extremity;left lower extremity  -     Left Upper Extremity (Weight-bearing Status) weight-bearing as tolerated (WBAT)  -     Left Lower Extremity (Weight-bearing Status) non weight-bearing (NWB)   -           User Key  (r) = Recorded By, (t) = Taken By, (c) = Cosigned By    Initials Name Provider Type     Jenna Stanley PT Physical Therapist               Obj/Interventions     [facility] Name 08/12/22 1412          Motor Skills    Therapeutic Exercise hip;knee;ankle;other (see comments)  min assist for technique  -[facility] Name 08/12/22 1412          Hip (Therapeutic Exercise)    Hip (Therapeutic Exercise) isometric exercises;strengthening exercise  -     Hip Isometrics (Therapeutic Exercise) bilateral;gluteal sets;10 repetitions;5 repetitions  -     Hip Strengthening (Therapeutic Exercise) bilateral;aBduction;aDduction;external rotation;internal rotation;heel slides;marching while seated;15 repititions  -[facility] Name 08/12/22 1412          Knee (Therapeutic Exercise)    Knee (Therapeutic Exercise) isometric exercises;strengthening exercise  -     Knee Isometrics (Therapeutic Exercise) bilateral;quad sets;10 repetitions;5 repetitions  -     Knee Strengthening (Therapeutic Exercise) bilateral;SLR (straight leg raise);LAQ (long arc quad);15 repititions  -[facility] Name 08/12/22 1412          Ankle (Therapeutic Exercise)    Ankle (Therapeutic Exercise) AROM (active range of motion)  -     Ankle AROM (Therapeutic Exercise) bilateral;dorsiflexion;plantarflexion;15 repititions  -[facility] Name 08/12/22 1412          Balance    Balance Assessment sitting static balance;sitting dynamic balance;sit to stand dynamic balance;standing static balance;standing dynamic balance  -     Static Sitting Balance supervision  -     Dynamic Sitting Balance contact guard  -     Position, Sitting Balance unsupported;sitting in chair  -     Sit to Stand Dynamic Balance moderate assist;2-person assist  -      Static Standing Balance minimal assist;2-person assist  -SS     Dynamic Standing Balance moderate assist;2-person assist  -     Position/Device Used, Standing Balance supported;walker, front-wheeled  -     Balance Interventions sitting;standing;sit to stand;supported;dynamic;static  -           User Key  (r) = Recorded By, (t) = Taken By, (c) = Cosigned By    Initials Name Provider Type     Jenna Stanley, PT Physical Therapist               Goals/Plan    No documentation.                Clinical Impression     [facility] Name 08/12/22 1414          Pain    Pretreatment Pain Rating 0/10 - no pain  -     Posttreatment Pain Rating 0/10 - no pain  -     Pain Intervention(s) Repositioned;Ambulation/increased activity;Elevated  -     Additional Documentation Pain Scale: Numbers Pre/Post-Treatment (Group)  -[facility] Name 08/12/22 1414          Plan of Care Review    Plan of Care Reviewed With patient;family  -     Progress improving  -     Outcome Evaluation Pt. performed bed mobility with min assist for sling placement and transferred bed to chair w/mechnical lift device, dependent of 2. He performed sit to stand w/mod assist of 2. He was able to maintain standing for ~30 seconds. Pt. fatigues very easily with functional mobility. He tolerated progression in ther-ex well requiring min assist for technique. Will continue to progress as able. Recommend inpatient rehab upon discharge.  -[facility] Name 08/12/22 1410          Therapy Assessment/Plan (PT)    Rehab Potential (PT) fair, will monitor progress closely  -     Criteria for Skilled Interventions Met (PT) yes;meets criteria;skilled treatment is necessary  -     Therapy Frequency (PT) daily  -     Row Name 08/12/22 1418          Vital Signs    Pre Systolic BP Rehab 140  -SS     Pre Treatment Diastolic BP 73  -SS     Pretreatment Heart Rate (beats/min) 78  -SS     Pre SpO2 (%) 96  -SS     O2 Delivery Pre Treatment hi-flow  -     Intra SpO2 (%) 97   -SS     O2 Delivery Intra Treatment hi-flow  -SS     Pre Patient Position Supine  -SS     Intra Patient Position Standing  -SS     Row Name 08/12/22 1414          Positioning and Restraints    Pre-Treatment Position in bed  -SS     Post Treatment Position chair  -SS     In Chair notified nsg;reclined;call light within reach;encouraged to call for assist;exit alarm on;with family/caregiver;legs elevated;on mechanical lift sling;waffle cushion  -           User Key  (r) = Recorded By, (t) = Taken By, (c) = Cosigned By    Initials Name Provider Type     Jenna Stanley, SETH Physical Therapist               Outcome Measures     Row Name 08/12/22 1417          How much help from another person do you currently need...    Turning from your back to your side while in flat bed without using bedrails? 3  -SS     Moving from lying on back to sitting on the side of a flat bed without bedrails? 3  -SS     Moving to and from a bed to a chair (including a wheelchair)? 2  -SS     Standing up from a chair using your arms (e.g., wheelchair, bedside chair)? 2  -SS     Climbing 3-5 steps with a railing? 1  -SS     To walk in hospital room? 1  -SS     AM-PAC 6 Clicks Score (PT) 12  -SS     Highest level of mobility 4 --> Transferred to chair/commode  -     Row Name 08/12/22 1417          Functional Assessment    Outcome Measure Options AM-PAC 6 Clicks Basic Mobility (PT)  -           User Key  (r) = Recorded By, (t) = Taken By, (c) = Cosigned By    Initials Name Provider Type    Jenna Ramirez PT Physical Therapist                             Physical Therapy Education                 Title: PT OT SLP Therapies (Done)     Topic: Physical Therapy (Done)     Point: Mobility training (Done)     Learning Progress Summary           Patient Abelardo, E, VU,NR by  at 8/12/2022 1418    Comment: Reviewed safety/technique with bed mobility, transfers, NWB status, PT POC, HEP      Show all documentation for this point (6)                  Point: Home exercise program (Done)     Learning Progress Summary           Patient Dreaer, E, VU,NR by  at 8/12/2022 1418    Comment: Reviewed safety/technique with bed mobility, transfers, NWB status, PT POC, HEP      Show all documentation for this point (6)                 Point: Body mechanics (Done)     Learning Progress Summary           Patient Eager, E, VU,NR by  at 8/12/2022 1418    Comment: Reviewed safety/technique with bed mobility, transfers, NWB status, PT POC, HEP      Show all documentation for this point (6)                 Point: Precautions (Done)     Learning Progress Summary           Patient Eager, E, VU,NR by  at 8/12/2022 1418    Comment: Reviewed safety/technique with bed mobility, transfers, NWB status, PT POC, HEP      Show all documentation for this point (6)                             User Key     Initials Effective Dates Name Provider Type Discipline     06/01/21 -  Jenna Stanley, PT Physical Therapist PT              PT Recommendation and Plan     Plan of Care Reviewed With: patient, family  Progress: improving  Outcome Evaluation: Pt. performed bed mobility with min assist for sling placement and transferred bed to chair w/mechnical lift device, dependent of 2. He performed sit to stand w/mod assist of 2. He was able to maintain standing for ~30 seconds. Pt. fatigues very easily with functional mobility. He tolerated progression in ther-ex well requiring min assist for technique. Will continue to progress as able. Recommend inpatient rehab upon discharge.     Time Calculation:    PT Charges     Row Name 08/12/22 1422             Time Calculation    Start Time 1332  -SS      PT Received On 08/12/22  -              Time Calculation- PT    Total Timed Code Minutes- PT 20 minute(s)  -              Timed Charges    84289 - PT Therapeutic Exercise Minutes 15  -SS      87817 - PT Therapeutic Activity Minutes 5  -SS              Total Minutes    Timed Charges Total Minutes 20   -SS       Total Minutes 20  -SS            User Key  (r) = Recorded By, (t) = Taken By, (c) = Cosigned By    Initials Name Provider Type    SS Jenna Stanley, PT Physical Therapist              Therapy Charges for Today     Code Description Service Date Service Provider Modifiers Qty    [id number] HC PT THER PROC EA 15 MIN 8/12/2022 Jenna Stanley PT GP 1          PT G-Codes  Outcome Measure Options: AM-PAC 6 Clicks Basic Mobility (PT)  AM-PAC 6 Clicks Score (PT): 12  AM-PAC 6 Clicks Score (OT): 15    Jenna Stanley PT  8/12/2022

## 2022-08-12 NOTE — PROGRESS NOTES
" [facility] - Progress Note    Jermaine Singh  1945  S383/1    08/12/22,  09:00 EDT      Chief Complaint: Following for PAD, HTN    Subjective:   C/O severe sharp pain Right upper chest radiating across to left side. Very uncomfortable.  + Nauseated.  Morphine has helped minimally.      Review of Systems:  Pertinent positives are listed above and in physical exam.  All others have been reviewed and are negative.    amLODIPine, 10 mg, Oral, Daily  ampicillin-sulbactam, 3 g, Intravenous, Q8H  aspirin, 81 mg, Oral, Daily  cetirizine, 5 mg, Oral, Daily  insulin detemir, 30 Units, Subcutaneous, Nightly  insulin lispro, 0-7 Units, Subcutaneous, TID AC  Insulin Lispro, 5 Units, Subcutaneous, TID With Meals  levothyroxine, 50 mcg, Oral, Q AM  metoprolol succinate XL, 50 mg, Oral, Q24H  polyethylene glycol, 17 g, Oral, Daily  sodium chloride, 10 mL, Intravenous, Q12H  sodium chloride, 10 mL, Intravenous, Q12H  vitamin D3, 5,000 Units, Oral, Daily        Objective:  Vitals:   height is 190.5 cm (75\") and weight is 98 kg (216 lb). His oral temperature is 98.1 °F (36.7 °C). His blood pressure is 145/73 and his pulse is 78. His respiration is 18 and oxygen saturation is 94%.       Intake/Output Summary (Last 24 hours) at 8/12/2022 0847  Last data filed at 8/12/2022 0600  Gross per 24 hour   Intake 600 ml   Output 850 ml   Net -250 ml       Physical Exam:  General:  WN, NAD, A and O x3.  CV:  Normal S1,S2. No murmur, rub, or gallop.  Resp:  CTA Soy, equal, mildly labored. + HFO2.  Abd:  Soft, decreased BS, no organomegaly. Mildly tender to palpation.  Extrem:  No edema BLE, 2+ pedal/PT pulses with doppler.  Dressing noted Left foot - clean, intact.         Results from last 7 days   Lab Units 08/11/22  0636   WBC 10*3/mm3 11.75*   HEMOGLOBIN g/dL 11.0*   HEMATOCRIT % 33.7*   PLATELETS 10*3/mm3 250     Results from last 7 days   Lab Units 08/11/22  0636   SODIUM mmol/L 145   POTASSIUM mmol/L 4.1   CHLORIDE " mmol/L 106   CO2 mmol/L 26.0   BUN mg/dL 46*   CREATININE mg/dL 1.80*   CALCIUM mg/dL 8.9   GLUCOSE mg/dL 185*     Results from last 7 days   Lab Units 08/11/22  0023   INR  1.05             Results from last 7 days   Lab Units 08/11/22  0636   PROBNP pg/mL 30,799.0*       Tele:  Paced.    Assessment and Plan:  1.  Peripheral Vascular Disease              -  Remote Bilateral iliac stenting with significant infrapopliteal disease, good collateral flow.              - He has excellent pulses bilaterally by doppler              -  POD 10 p Left great toe transmetatarsal amputation.   -  Continue PT/OT     2.  CAD              -  Remote CABG              -  Currently asymptomatic.              -  Continue ASA, CCB   -  Emesis/nausea last night with increased respiratory effort.   -  Slight elevation in troponins - demand ischemia with acute CHF.  EKG shows no acute findings, V Paced. Diurese.  DC'd IV heparin.   -  Repeat echo shows EF 46-50%, LV septal HK, no significant valvular disease.       3.  Osteomyelitis              -  Left toe wound being followed by podiatrist as outpt with ortho shoe/oral antibiotics                     -  Elevated inflammatory markers/leukocytosis upon arrival with XRay suggesting osteo.              -  IV Antibiotics per ID.  Dr. Márquez will continue to follow.      4.  DMII              -  Per primary service.       5.  Essential Hypertension              -  Better controlled              -  Continue home dose Toprol XL 50mg daily.  Trialed low dose Lisinopril with rise in Cr.   Discontinued ACEI.   Increased Norvasc to 10mg daily and continue to monitor.     -   Cr 1.8 yesterday.        6.  Hyperlipidemia              -  Lipid panel reviewed.  , , HDL 39, .              -  Will address statin.     7.  Left shoulder dislocation              -  S/P fall prompting ED visit/admission.              -  PT/OT         8.  [place]   -  Cr 1.8 yesterday   -  ACEI discontinued.   -   Continue with daily labs.    9.  Hypoxia   -  Pt now requiring HFO2    -  Check CXR.         78 yo CM with known CAD/CABG, PVDz with prior stenting.  Presents after fall resulting in left shoulder dislocation as well as presenting with left toe wound.  Inflammatory markers and WBC elevated, Xray suggests osteomyelitis.  He has known PVDz with severe infrapopliteal disease.  However, he has bilateral collateral flow and good pulses by doppler bilaterally.  At this time, there is no need for revascularization of the LLE and amputation recommended.  Post Op course is stable.  BP better controlled with ACEI but [place] noted.  DC'd ACEI and increased CCB with better control.       Cardiology initially signed off and asked to re evaluate for nausea/emesis with elevated troponins, likely demand ischemia with acute CHF.  Diurese and check a.m. labs.   Echo reviewed and WNL.      I discussed the patient's findings and my recommendations with the patient, any present family members, and the nursing staff.  Cooper Apodaca MD saw and examined patient, verified hx and PE, read all radiographic studies, reviewed labs and micro data, and formulated dx, plan for treatment and all medical decision making.      Megan Shukla PA-C  08/12/22, 09:00 EDT

## 2022-08-12 NOTE — PLAN OF CARE
Goal Outcome Evaluation:      Pt alert and oriented but opting to point at things/gesture instead of speaking. Maintained on 35 - 40 L HF NC overnight. No c/o pain in LUE unless repositioning.      At approximately 0500, pt began c/o spontaneous 7/10, non-radiating, stabbing R chest pain not associated with AMS, diaphoresis, nausea, VS changes, syncope, or changed breath sounds. Vital signs taken and STAT EKG obtained showing no changes. PM hospitalist contacted regarding possible orders, awaiting return call/orders. RN increased frequency of rounding and completed more frequent assessments with no changes noted. WCTM.

## 2022-08-12 NOTE — CASE MANAGEMENT/SOCIAL WORK
Continued Stay Note  [facility]     Patient Name: Jermaine Singh  MRN: [id number]  Today's Date: 8/12/2022    Admit Date: 7/27/2022     Discharge Plan     Row Name 08/12/22 1442       Plan    Plan [facility] acute rehab    Plan Comments Case mgt f/u. Chart reviewed. D/C plan remains to go to acute rehab at [facility] when medically ready. [facility] is following and will f/u Monday               Discharge Codes    No documentation.               Expected Discharge Date and Time     Expected Discharge Date Expected Discharge Time    Aug 15, 2022             Sonja C Kellerman, RN

## 2022-08-12 NOTE — PLAN OF CARE
Goal Outcome Evaluation:  Plan of Care Reviewed With: patient, family        Progress: improving  Outcome Evaluation: Pt. performed bed mobility with min assist for sling placement and transferred bed to chair w/mechnical lift device, dependent of 2. He performed sit to stand w/mod assist of 2. He was able to maintain standing for ~30 seconds. Pt. fatigues very easily with functional mobility. He tolerated progression in ther-ex well requiring min assist for technique. Will continue to progress as able. Recommend inpatient rehab upon discharge.

## 2022-08-12 NOTE — THERAPY TREATMENT NOTE
Patient Name: Jermaine Singh  : 1945    MRN: [id number]                              Today's Date: 2022       Admit Date: 2022    Visit Dx:     ICD-10-CM ICD-9-CM   1. Dislocation of left shoulder joint, initial encounter  S43.005A 831.00   2. Cellulitis of left foot  L03.116 682.7   3. 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  Preserved LV function   • Type 2 diabetes mellitus ([facility])   • Hypertension   • Hyperlipidemia   • Hypothyroidism (acquired)   • Vitamin D deficiency on Rx    • Serum Cr 1.32 on admission.  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 Au   • Dizziness   • Atherosclerosis of native artery of both lower extremities with intermittent claudication ([facility])   • SSS (sick sinus syndrome) ([facility])   • Dislocation of left shoulder joint, initial encounter   • Cellulitis in diabetic foot ([facility])   • Fall   • Sepsis ([facility])   • Leukocytosis   • Lactic acidosis   • Elevated C-reactive protein (CRP)   • Elevated sed rate (elev SR)   • Proximal phalanx fracture of the second digit extending into the second metatarsal joint     Past Medical History:   Diagnosis Date   • Asthma    • Coronary artery disease    • Diabetes mellitus ([facility])     started on inuslin 2018; started on po meds in ; checking blood sugars daily    • Disease of thyroid gland     po meds daily for hypothyroidism    • History of fracture as a child     rt leg- severe    • Hyperlipidemia    • Hypertension    • Hypothyroidism    • Peripheral neuropathy    • Peripheral vascular disease ([facility])     s/p angiogram -needs stent in left leg    • RBBB    • Right knee pain    • Vitamin D deficiency      Past Surgical History:   Procedure Laterality Date   • APPENDECTOMY     • CARDIAC CATHETERIZATION N/A 2019    Procedure: Left Heart Cath;  Surgeon: Paulie  Cooper TRACEY MD;  Location:  [facility]VASIVE [facility];  Service: Cardiology   • CARDIAC ELECTROPHYSIOLOGY PROCEDURE N/A 5/18/2022    Procedure: DEVICE IMPLANT;  Surgeon: Cooper Apodaca MD;  Location:  [facility]ATH [facility]VASIVE LOC[facility];  Service: Cardiology;  Laterality: N/A;   • COLONOSCOPY     • CORONARY ARTERY BYPASS GRAFT N/A 3/22/2019    Procedure: MEDIAN STERNOTOMY, CORONARY ARTERY BYPASS GRAFT X3, UTILIZING THE LEFT INTERNAL MAMMARY ARTERY, EVH AND OPEN HARVEST OF THE RIGHT GREATER SAPHENOUS VEIN, EXPLORATION OF THE LEFT LEG;  Surgeon: Ap Au MD;  Location:  [facility];  Service: Cardiothoracic   • EYE SURGERY Bilateral     cataracts    • INTERVENTIONAL RADIOLOGY PROCEDURE N/A 7/29/2021    Procedure: Abdominal Aortagram with Runoff;  Surgeon: Jermaine Hernandez MD;  Location:  [facility]ATH [facility]VASIVE [facility];  Service: Cardiovascular;  Laterality: N/A;   • KNEE ARTHROSCOPY      right x 2, left x 1   • LACERATION REPAIR      right leg   • LEG SURGERY      2 for fracture of rt leg    • TONSILLECTOMY      Adnoidectomy   • TRANS METATARSAL AMPUTATION Left 8/2/2022    Procedure: GREAT TOE AMPUTATION LEFT;  Surgeon: Gopal Márquez MD;  Location:  [facility];  Service: Vascular;  Laterality: Left;      General Information     Row Name 08/12/22 1456          OT Time and Intention    Document Type therapy note (daily note)  -     Mode of Treatment occupational therapy  -     Row Name 08/12/22 1456          General Information    Patient Profile Reviewed yes  -HK     Existing Precautions/Restrictions fall;left;non-weight bearing;other (see comments);oxygen therapy device and L/min  NWB L LE; L UE WBAT NO EXTERNAL ROTATION OR ABDuction  -HK     Barriers to Rehab medically complex;previous functional deficit  -     Row Name 08/12/22 1456          Cognition    Orientation Status (Cognition) unable/difficult to assess  -     Row Name 08/12/22 1456          Safety Issues, Functional Mobility    Safety  Issues Affecting Function (Mobility) safety precautions follow-through/compliance;safety precaution awareness;insight into deficits/self-awareness;unable to maintain weight-bearing restrictions;sequencing abilities;awareness of need for assistance  -     Impairments Affecting Function (Mobility) balance;endurance/activity tolerance;pain;strength;range of motion (ROM);postural/trunk control  -HK     Cognitive Impairments, Mobility Safety/Performance attention;awareness, need for assistance;insight into deficits/self-awareness;judgment;problem-solving/reasoning;safety precaution awareness;safety precaution follow-through  -HK           User Key  (r) = Recorded By, (t) = Taken By, (c) = Cosigned By    Initials Name Provider Type     Thelma Jackson, OT Occupational Therapist                 Mobility/ADL's     Row Name 08/12/22 1457          Bed Mobility    Bed Mobility rolling right;rolling left  -     Rolling Left [place] (Bed Mobility) minimum assist (75% patient effort);verbal cues  -     Rolling Right [place] (Bed Mobility) minimum assist (75% patient effort);verbal cues  -HK     Bed Mobility, Safety Issues impaired trunk control for bed mobility;decreased use of arms for pushing/pulling  -HK     Assistive Device (Bed Mobility) head of bed elevated;bed rails  -     Row Name 08/12/22 1454          Transfers    Transfers sit-stand transfer;bed-chair transfer  -     Bed-Chair [place] (Transfers) dependent (less than 25% patient effort);2 person assist  -     Assistive Device (Bed-Chair Transfers) lift device  -     Sit-Stand [place] (Transfers) moderate assist (50% patient effort);2 person assist;verbal cues;nonverbal cues (demo/gesture)  -     Row Name 08/12/22 1456          Bed-Chair Transfer    Comment, (Bed-Chair Transfer) Pt dependent for bed to chair transfer due to difficulty maintaining NWB status.  -     Row Name 08/12/22 1459          Sit-Stand Transfer    Assistive  Device (Sit-Stand Transfers) walker, front-wheeled  -     Comment, (Sit-Stand Transfer) Verbal cues for hand placement and appropriate body mechanics to maintain NWB status  -     Row Name 08/12/22 1459          Activities of Daily Living    BADL Assessment/Intervention grooming  -     Row Name 08/12/22 1459          Mobility    Extremity Weight-bearing Status left upper extremity;left lower extremity  -     Left Upper Extremity (Weight-bearing Status) weight-bearing as tolerated (WBAT)  -     Left Lower Extremity (Weight-bearing Status) non weight-bearing (NWB)   -     Row Name 08/12/22 1459          Grooming Assessment/Training    [place] Level (Grooming) wash face, hands;set up;hair care, combing/brushing;maximum assist (25% patient effort)  -     Position (Grooming) unsupported sitting  -           User Key  (r) = Recorded By, (t) = Taken By, (c) = Cosigned By    Initials Name Provider Type     Thelma Jackson, OT Occupational Therapist               Obj/Interventions     Row Name 08/12/22 1502          Sensory Assessment (Somatosensory)    Sensory Assessment (Somatosensory) sensation intact  -     Row Name 08/12/22 1502          Vision Assessment/Intervention    Visual Impairment/Limitations WFL  -     Row Name 08/12/22 1502          Shoulder (Therapeutic Exercise)    Shoulder (Therapeutic Exercise) AAROM (active assistive range of motion)  -     Shoulder AAROM (Therapeutic Exercise) left;flexion;extension  -     Row Name 08/12/22 1502          Elbow/Forearm (Therapeutic Exercise)    Elbow/Forearm (Therapeutic Exercise) AROM (active range of motion)  -     Elbow/Forearm AROM (Therapeutic Exercise) left;flexion;extension;supination;pronation;10 repetitions  -     Row Name 08/12/22 1502          Wrist (Therapeutic Exercise)    Wrist (Therapeutic Exercise) AROM (active range of motion)  -     Wrist AROM (Therapeutic Exercise) left;flexion;extension;10 repetitions  -[facility]  Name 08/12/22 1502          Hand (Therapeutic Exercise)    Hand (Therapeutic Exercise) AROM (active range of motion)  -     Hand AROM/AAROM (Therapeutic Exercise) left;AROM (active range of motion);finger flexion;finger extension  -     Row Name 08/12/22 1502          Motor Skills    Therapeutic Exercise shoulder;elbow/forearm;wrist;hand  -HK     Row Name 08/12/22 1502          Balance    Balance Assessment sitting static balance;sitting dynamic balance;standing static balance  -     Static Sitting Balance contact guard  CGA for brief periods before leaning back  -     Dynamic Sitting Balance contact guard  -HK     Position, Sitting Balance unsupported;sitting in chair  -HK     Static Standing Balance minimal assist;2-person assist;verbal cues  -HK     Dynamic Standing Balance moderate assist;2-person assist;verbal cues  -HK     Position/Device Used, Standing Balance supported;walker, rolling  -     Balance Interventions sitting;occupation based/functional task  -           User Key  (r) = Recorded By, (t) = Taken By, (c) = Cosigned By    Initials Name Provider Type     Thelma Jackson, OT Occupational Therapist               Goals/Plan    No documentation.                Clinical Impression     Row Name 08/12/22 1505          Pain Assessment    Pretreatment Pain Rating 0/10 - no pain  -     Posttreatment Pain Rating 0/10 - no pain  -HK     Row Name 08/12/22 1505          Pain Scale: FACES Pre/Post-Treatment    Pre/Posttreatment Pain Comment Pt continues to be  -     Row Name 08/12/22 1505          Plan of Care Review    Plan of Care Reviewed With patient  -HK     Progress improving  -     Outcome Evaluation Pt continues to be limited by lethargy. Decreased communcation due to SOA noted this date. Pt communcating with hand gestures or single words. Pt rolled L to R in bed for sling placement with minAx1. Not appropriate to attempt stand pivot transfer due to inabiltiy to maintain WB status. Pt  stood from chair with modAx2 and use of RW. Max verbal and tactile cues to maintain NWB status of L LE. Pt maxA for hair care and washed face with setup. Pt completed AAROM/AROM x10 reps shoulder/elbow/wrist/hand L UE. Recommend d/c to IPR when medically ready.  -     Row Name 08/12/22 1501          Therapy Assessment/Plan (OT)    Patient/Family Therapy Goal Statement (OT) Pt would like to improve and return home.  -HK     Rehab Potential (OT) good, to achieve stated therapy goals  -     Criteria for Skilled Therapeutic Interventions Met (OT) yes;meets criteria;skilled treatment is necessary  -     Therapy Frequency (OT) daily  -     Row Name 08/12/22 1503          Therapy Plan Review/Discharge Plan (OT)    Anticipated Discharge Disposition (OT) inpatient rehabilitation facility  -     Row Name 08/12/22 1500          Vital Signs    Pre Systolic BP Rehab --  RN cleared for tx; VSS; RN at bedside throughout session  -HK     O2 Delivery Pre Treatment hi-flow  -HK     O2 Delivery Intra Treatment hi-flow  -HK     O2 Delivery Post Treatment hi-flow  -HK     Pre Patient Position Supine  -HK     Intra Patient Position Standing  -HK     Post Patient Position Sitting  -HK     Row Name 08/12/22 2388          Positioning and Restraints    Pre-Treatment Position in bed  -HK     Post Treatment Position chair  -HK     In Chair notified nsg;reclined;call light within reach;encouraged to call for assist;exit alarm on;with family/caregiver;on mechanical lift sling;waffle cushion  -           User Key  (r) = Recorded By, (t) = Taken By, (c) = Cosigned By    Initials Name Provider Type     Thelma Jackson, OT Occupational Therapist               Outcome Measures     Row Name 08/12/22 3347          How much help from another is currently needed...    Putting on and taking off regular lower body clothing? 2  -HK     Bathing (including washing, rinsing, and drying) 3  -HK     Toileting (which includes using toilet bed pan or  urinal) 2  -HK     Putting on and taking off regular upper body clothing 3  -HK     Taking care of personal grooming (such as brushing teeth) 2  -HK     Eating meals 3  -HK     AM-PAC 6 Clicks Score (OT) 15  -HK     Row Name 08/12/22 1417          How much help from another person do you currently need...    Turning from your back to your side while in flat bed without using bedrails? 3  -SS     Moving from lying on back to sitting on the side of a flat bed without bedrails? 3  -SS     Moving to and from a bed to a chair (including a wheelchair)? 2  -SS     Standing up from a chair using your arms (e.g., wheelchair, bedside chair)? 2  -SS     Climbing 3-5 steps with a railing? 1  -SS     To walk in hospital room? 1  -SS     AM-PAC 6 Clicks Score (PT) 12  -SS     Highest level of mobility 4 --> Transferred to chair/commode  -     Row Name 08/12/22 1510 08/12/22 1417       Functional Assessment    Outcome Measure Options AM-PAC 6 Clicks Daily Activity (OT)  - AM-PAC 6 Clicks Basic Mobility (PT)  -          User Key  (r) = Recorded By, (t) = Taken By, (c) = Cosigned By    Initials Name Provider Type     Thelma Jackson, OT Occupational Therapist    SS Jenna Stanley, PT Physical Therapist                Occupational Therapy Education                 Title: PT OT SLP Therapies (Done)     Topic: Occupational Therapy (Done)     Point: ADL training (Done)     Description:   Instruct learner(s) on proper safety adaptation and remediation techniques during self care or transfers.   Instruct in proper use of assistive devices.              Learning Progress Summary           Patient Acceptance, E,TB,D, VU,NR by  at 8/12/2022 1510      Show all documentation for this point (9)                 Point: Home exercise program (Done)     Description:   Instruct learner(s) on appropriate technique for monitoring, assisting and/or progressing therapeutic exercises/activities.              Learning Progress Summary            Patient Acceptance, E,TB,D, VU,NR by  at 8/12/2022 1510      Show all documentation for this point (9)                 Point: Precautions (Done)     Description:   Instruct learner(s) on prescribed precautions during self-care and functional transfers.              Learning Progress Summary           Patient Acceptance, E,TB,D, VU,NR by  at 8/12/2022 1510      Show all documentation for this point (9)                 Point: Body mechanics (Done)     Description:   Instruct learner(s) on proper positioning and spine alignment during self-care, functional mobility activities and/or exercises.              Learning Progress Summary           Patient Acceptance, E,TB,D, VU,NR by  at 8/12/2022 1510      Show all documentation for this point (5)                             User Key     Initials Effective Dates Name Provider Type Discipline     06/16/21 -  Thelma Jackson, OT Occupational Therapist OT              OT Recommendation and Plan  Therapy Frequency (OT): daily  Plan of Care Review  Plan of Care Reviewed With: patient  Progress: improving  Outcome Evaluation: Pt continues to be limited by lethargy. Decreased communcation due to SOA noted this date. Pt communcating with hand gestures or single words. Pt rolled L to R in bed for sling placement with minAx1. Not appropriate to attempt stand pivot transfer due to inabiltiy to maintain WB status. Pt stood from chair with modAx2 and use of RW. Max verbal and tactile cues to maintain NWB status of L LE. Pt maxA for hair care and washed face with setup. Pt completed AAROM/AROM x10 reps shoulder/elbow/wrist/hand L UE. Recommend d/c to IPR when medically ready.     Time Calculation:    Time Calculation- OT     Row Name 08/12/22 1334             Time Calculation- OT    OT Start Time 1334  -      OT Received On 08/12/22  -              Timed Charges    04729 - OT Self Care/Mgmt Minutes 15  -              Total Minutes    Timed Charges Total Minutes 15  -        Total Minutes 15  -HK            User Key  (r) = Recorded By, (t) = Taken By, (c) = Cosigned By    Initials Name Provider Type    Thelma Phelps, OT Occupational Therapist              Therapy Charges for Today     Code Description Service Date Service Provider Modifiers Qty    [id number] HC OT SELF CARE/MGMT/TRAIN EA 15 MIN 8/11/2022 Thelma Jackson, OT GO 2    04677274293 HC OT SELF CARE/MGMT/TRAIN EA 15 MIN 8/12/2022 Thelma Jackson, OT GO 1               Thelma Jackson OT  8/12/2022

## 2022-08-12 NOTE — PROGRESS NOTES
Cardiothoracic Surgery Progress Note      POD #: 10-left great toe transmetatarsal amputation primary closure     LOS: 16 days      Subjective: Awake and alert still on CPAP mask:    Objective:  Vital Signs  Temp:  [97.6 °F (36.4 °C)-98.8 °F (37.1 °C)] 97.8 °F (36.6 °C)  Heart Rate:  [76-89] 88  Resp:  [18-22] 20  BP: (132-171)/() 149/44    Physical Exam:   General Appearance: Oriented x3   Lungs:   Heart:   Skin:   Incision: Potation site dressing change.  Suture intact skin margin viable skin flaps are viable.     Results:  Results from last 7 days   Lab Units 08/11/22  0636   WBC 10*3/mm3 11.75*   HEMOGLOBIN g/dL 11.0*   HEMATOCRIT % 33.7*   PLATELETS 10*3/mm3 250     Results from last 7 days   Lab Units 08/11/22  0636   SODIUM mmol/L 145   POTASSIUM mmol/L 4.1   CHLORIDE mmol/L 106   CO2 mmol/L 26.0   BUN mg/dL 46*   CREATININE mg/dL 1.80*   GLUCOSE mg/dL 185*   CALCIUM mg/dL 8.9         Assessment: #1.  Postop day 10 left great toe metatarsal amputation with primary closure healing well  2 status post remote coronary bypass grafting 3 status post remote pacemaker placed for sick sinus syndrome  Before recent fall left shoulder dislocation reduced in the emergency department  5.  Post endovascular stenting angioplasty right lower extremity per Dr. Hernandez in 2019  #6 flash pulmonary edema    Plan: Medical management per hospitalist and cardiology.  Antibiotics per infectious disease.  I will continue daily dressing change amputation site.      Gopal Márquez MD - 08/12/22 - 06:10 EDT

## 2022-08-12 NOTE — PROGRESS NOTES
Jermaine JIMENES Francisco  1945  [id number]    Date of Consult: 8/12/2022    Admission Date: 7/27/2022      Requesting Provider: No ref. provider found  Evaluating Physician: Sage Braga MD    Reason for Consultation: Evaluation of toe wound    History of present illness:    Patient is a 77 y.o. male with coronary disease history of CABG diabetes mellitus type 2 hypothyroidism hypertension hyperlipidemia prevascular disease presents the emergency room after having a fall and injuring left shoulder patient tripped while walking his dog.  Coincidentally patient wears an orthotic shoe as recommended by a podiatrist has been on oral doxycycline for a left toe wound we are asked to evaluate this patient to start on broad-spectrum antibiotics occluding vancomycin and Zosyn.  X-ray of left foot showed soft tissue swelling in the forefoot without obvious fracture or osteomyelitis.    Has history of pacemaker    History of vascular stent placed in right leg.    7/29/2022; is having bone scan today denies fevers rash sore throat or diarrhea    7/30/22:  Bone scan compatible with osteomyelitis distal phalanx of first digit, acute fracture 2nd digit.  Afebrile. Blood cultures no growth to date.     7/31/22:  Afebrile.   Dr. Márquez consulting Dr. Apodaca for vascular studies left lower extremity.   No n/v/d.  Complains of shoulder pain.     8/2/22; doing well; had surgery today; toe amputation, no fever, rash, sore throat    8/3/22; no events overnight; resting quietly no events overnight    8/4/22; doing well; no events overnight; no fever, rash, sore throat;   8/5/22; no events overnight; no fever, rash, sore throat, no diarrhea    8/8/22; no events overnight; no fever, rash, sore throat    8/9/22; no events overnight; no fever, rash, sore throat no diarrhea  8/11/22; patient on hfnc, followed by cardiology being worked up for elevated troponin, pobnp.  Resting quietly    8/12/22; on hfnc, getting diuresed, no fever,  rahs, sore throat has nausea, reports chest pain    Past Medical History:   Diagnosis Date   • Asthma    • Coronary artery disease    • Diabetes mellitus (HCC) 2000    started on inuslin 12/2018; started on po meds in 2000; checking blood sugars daily    • Disease of thyroid gland     po meds daily for hypothyroidism    • History of fracture as a child     rt leg- severe    • Hyperlipidemia    • Hypertension    • Hypothyroidism    • Peripheral neuropathy    • Peripheral vascular disease (HCC)     s/p angiogram 2/19-needs stent in left leg    • RBBB    • Right knee pain    • Vitamin D deficiency        Past Surgical History:   Procedure Laterality Date   • APPENDECTOMY     • CARDIAC CATHETERIZATION N/A 2/14/2019    Procedure: Left Heart Cath;  Surgeon: Cooper Apodaca MD;  Location:  [facility];  Service: Cardiology   • CARDIAC ELECTROPHYSIOLOGY PROCEDURE N/A 5/18/2022    Procedure: DEVICE IMPLANT;  Surgeon: Cooper Apodaca MD;  Location:  [facility];  Service: Cardiology;  Laterality: N/A;   • COLONOSCOPY     • CORONARY ARTERY BYPASS GRAFT N/A 3/22/2019    Procedure: MEDIAN STERNOTOMY, CORONARY ARTERY BYPASS GRAFT X3, UTILIZING THE LEFT INTERNAL MAMMARY ARTERY, EVH AND OPEN HARVEST OF THE RIGHT GREATER SAPHENOUS VEIN, EXPLORATION OF THE LEFT LEG;  Surgeon: Ap Au MD;  Location:  [facility];  Service: Cardiothoracic   • EYE SURGERY Bilateral     cataracts    • INTERVENTIONAL RADIOLOGY PROCEDURE N/A 7/29/2021    Procedure: Abdominal Aortagram with Runoff;  Surgeon: Jermaine Hernandez MD;  Location:  [facility];  Service: Cardiovascular;  Laterality: N/A;   • KNEE ARTHROSCOPY      right x 2, left x 1   • LACERATION REPAIR      right leg   • LEG SURGERY      2 for fracture of rt leg    • TONSILLECTOMY      Adnoidectomy   • TRANS METATARSAL AMPUTATION Left 8/2/2022    Procedure: GREAT TOE AMPUTATION LEFT;  Surgeon: Gopal Márquez MD;   Location: [facility];  Service: Vascular;  Laterality: Left;       Family History   Problem Relation Age of Onset   • Coronary artery disease Mother    • Diabetes Mother    • Cancer Father        Social History     Socioeconomic History   • Marital status:    • Number of children: 2   Tobacco Use   • Smoking status: Never Smoker   • Smokeless tobacco: Never Used   Substance and Sexual Activity   • Alcohol use: No   • Drug use: No   • Sexual activity: Defer       No Known Allergies      Medication:    Current Facility-Administered Medications:   •  acetaminophen (TYLENOL) tablet 650 mg, 650 mg, Oral, Q4H PRN, 650 mg at 08/06/22 2110 **OR** acetaminophen (TYLENOL) 160 MG/5ML solution 650 mg, 650 mg, Oral, Q4H PRN **OR** acetaminophen (TYLENOL) suppository 650 mg, 650 mg, Rectal, Q4H PRN, Nishant Allan PA  •  albuterol (PROVENTIL) nebulizer solution 0.083% 2.5 mg/3mL, 2.5 mg, Nebulization, Q6H PRN, Rajani Farr, DO  •  amLODIPine (NORVASC) tablet 10 mg, 10 mg, Oral, Daily, Nishant Allan PA, 10 mg at 08/12/22 1209  •  ampicillin-sulbactam 3 g/100 mL 0.9% NS IVPB, 3 g, Intravenous, Q8H, Sage Barga MD, Last Rate: 0 mL/hr at 08/10/22 0133, 3 g at 08/12/22 0805  •  aspirin chewable tablet 81 mg, 81 mg, Oral, Daily, Nishant Allan PA, 81 mg at 08/12/22 0824  •  bisacodyl (DULCOLAX) suppository 10 mg, 10 mg, Rectal, Daily PRN, Nishant Allan PA, 10 mg at 08/09/22 0913  •  cetirizine (zyrTEC) tablet 5 mg, 5 mg, Oral, Daily, Nishant Allan PA, 5 mg at 08/11/22 0827  •  dextrose (D50W) (25 g/50 mL) IV injection 25 g, 25 g, Intravenous, Q15 Min PRN, Nishant Allan PA  •  dextrose (GLUTOSE) oral gel 15 g, 15 g, Oral, Q15 Min PRN, Nishant Allan PA  •  glucagon (human recombinant) (GLUCAGEN DIAGNOSTIC) injection 1 mg, 1 mg, Intramuscular, Q15 Min PRN, Nishant Allan PA  •  heparin (porcine) 5000 UNIT/ML injection 5,000 Units, 5,000 Units, Subcutaneous, Q8H, Reilly Thomas MD, 5,000 Units at  08/12/22 1326  •  HYDROcodone-acetaminophen (NORCO) 5-325 MG per tablet 1 tablet, 1 tablet, Oral, Q4H PRN, Peter Braga DO, 1 tablet at 08/12/22 1209  •  insulin detemir (LEVEMIR) injection 30 Units, 30 Units, Subcutaneous, Nightly, Melissa Nguyen PA-C, 30 Units at 08/10/22 2016  •  Insulin Lispro (humaLOG) injection 0-7 Units, 0-7 Units, Subcutaneous, TID AC, eMlissa Nguyen PA-C, 2 Units at 08/12/22 1220  •  Insulin Lispro (humaLOG) injection 5 Units, 5 Units, Subcutaneous, TID With Meals, Melissa Nguyen PA-C, 5 Units at 08/12/22 1220  •  levothyroxine (SYNTHROID, LEVOTHROID) tablet 50 mcg, 50 mcg, Oral, Q AM, Nishant Allan PA, 50 mcg at 08/12/22 0559  •  metoprolol succinate XL (TOPROL-XL) 24 hr tablet 50 mg, 50 mg, Oral, Q24H, Nishant Allan PA, 50 mg at 08/12/22 1209  •  nitroglycerin (NITROSTAT) SL tablet 0.4 mg, 0.4 mg, Sublingual, Q5 Min PRN, Reilly Thomas MD  •  ondansetron (ZOFRAN) tablet 4 mg, 4 mg, Oral, Q6H PRN **OR** ondansetron (ZOFRAN) injection 4 mg, 4 mg, Intravenous, Q6H PRN, Nishant Allan PA, 4 mg at 08/10/22 1607  •  phenol (CHLORASEPTIC) 1.4 % liquid 1 spray, 1 spray, Mouth/Throat, Q2H PRN, Megan Shukla PA, 1 spray at 08/04/22 1610  •  polyethylene glycol (MIRALAX) packet 17 g, 17 g, Oral, Daily, Rajani Farr DO, 17 g at 08/11/22 0827  •  prochlorperazine (COMPAZINE) injection 5 mg, 5 mg, Intravenous, Q6H PRN, Rajani Farr DO, 5 mg at 08/12/22 1325  •  sennosides-docusate (PERICOLACE) 8.6-50 MG per tablet 2 tablet, 2 tablet, Oral, BID PRN, Nishant Allan PA, 2 tablet at 08/09/22 1402  •  [COMPLETED] Insert peripheral IV, , , Once **AND** sodium chloride 0.9 % flush 10 mL, 10 mL, Intravenous, PRN, Nishant Allan PA  •  sodium chloride 0.9 % flush 10 mL, 10 mL, Intravenous, Q12H, Nishant Allan PA, 10 mL at 08/10/22 2017  •  sodium chloride 0.9 % flush 10 mL, 10 mL, Intravenous, PRN, Nishant Allan PA  •  sodium chloride 0.9 % flush 10  mL, 10 mL, Intravenous, Q12H, Rajani Farr DO, 10 mL at 08/12/22 0811  •  sodium chloride 0.9 % flush 10 mL, 10 mL, Intravenous, PRN, Rajani Farr DO, 10 mL at 08/10/22 1416  •  vitamin D3 capsule 5,000 Units, 5,000 Units, Oral, Daily, Nishant Allan PA, 5,000 Units at 08/11/22 0828    Facility-Administered Medications Ordered in Other Encounters:   •  Chlorhexidine Gluconate Cloth 2 % pads 1 application, 1 application, Topical, Q12H PRN, Mohan Arauz PA    Antibiotics:  Anti-Infectives (From admission, onward)    Ordered     Dose/Rate Route Frequency Start Stop    08/08/22 1445  ampicillin-sulbactam 3 g/100 mL 0.9% NS IVPB        Ordering Provider: Sage Braga MD    3 g Intravenous Every 8 Hours 08/08/22 1600 08/15/22 1559    08/04/22 1550  cefepime (MAXIPIME) 2 g/100 mL 0.9% NS (mbp)        Ordering Provider: Sage Braga MD    2 g  200 mL/hr over 30 Minutes Intravenous Once 08/04/22 1645 08/04/22 1640    08/02/22 1111  vancomycin 1500 mg/500 mL 0.9% NS IVPB ([facility])        Ordering Provider: Nishant Allan PA    15 mg/kg × 98 kg Intravenous Once 08/02/22 2200 08/02/22 2130    08/01/22 1209  vancomycin in dextrose 5% 150 mL (VANCOCIN) IVPB 750 mg        Ordering Provider: Miguel Bray RPH    750 mg Intravenous Every 12 Hours 08/01/22 2200 08/02/22 0934    08/01/22 1209  vancomycin in dextrose 5% 150 mL (VANCOCIN) IVPB 750 mg        Ordering Provider: Miguel Bray RPH    750 mg  over 60 Minutes Intravenous Every 12 Hours 08/01/22 1300 08/01/22 1422    07/28/22 0014  piperacillin-tazobactam (ZOSYN) 3.375 g in iso-osmotic dextrose 50 ml (premix)        Ordering Provider: Eliana Hightower APRN    3.375 g  over 4 Hours Intravenous Every 8 Hours 07/28/22 0800 08/02/22 0303    07/27/22 2208  piperacillin-tazobactam (ZOSYN) 3.375 g in iso-osmotic dextrose 50 ml (premix)        Ordering Provider: Donny Hightower APRN    3.375 g  over 30 Minutes Intravenous Once 07/27/22  2210 22 0320    22 220  vancomycin 2000 mg/500 mL 0.9% NS IVPB ([facility])        Ordering Provider: Donny Hightower APRN    20 mg/kg × 98 kg Intravenous Once 22 0040            Review of Systems:  See HPI      Physical Exam:   Vital Signs  Temp (24hrs), Av.9 °F (36.6 °C), Min:97.6 °F (36.4 °C), Max:98.2 °F (36.8 °C)    Temp  Min: 97.6 °F (36.4 °C)  Max: 98.2 °F (36.8 °C)  BP  Min: 123/88  Max: 180/84  Pulse  Min: 76  Max: 89  Resp  Min: 18  Max: 22  SpO2  Min: 91 %  Max: 97 %    GENERAL: resting quietly  in no acute distress.   HEENT: Normocephalic, atraumatic.  PERRL. EOMI. No conjunctival injection. No icterus.   HEART: RRR; No murmur,  LUNGS: Clear to auscultation bilaterally   ABDOMEN: Soft, nontender,   :  Without Fonseca catheter.  MSK: Left foot wrapped  SKIN: Warm and dry without cutaneous eruptions on Inspection/palpation.        Laboratory Data    Results from last 7 days   Lab Units 22  0941 22  0636 22  0023   WBC 10*3/mm3 13.76* 11.75* 15.41*   HEMOGLOBIN g/dL 11.2* 11.0* 11.8*   HEMATOCRIT % 35.5* 33.7* 35.7*   PLATELETS 10*3/mm3 236 250 252     Results from last 7 days   Lab Units 22  0941   SODIUM mmol/L 146*   POTASSIUM mmol/L 4.2   CHLORIDE mmol/L 103   CO2 mmol/L 26.0   BUN mg/dL 47*   CREATININE mg/dL 1.84*   GLUCOSE mg/dL 206*   CALCIUM mg/dL 8.8                 Results from last 7 days   Lab Units 22  0636   LACTATE mmol/L 1.2             Estimated Creatinine Clearance: 46.6 mL/min (A) (by C-G formula based on SCr of 1.84 mg/dL (H)).      Microbiology:  No results found for: ACANTHNAEG, AFBCX, BPERTUSSISCX, BLOODCX  No results found for: BCIDPCR, CXREFLEX, CSFCX, CULTURETIS  No results found for: CULTURES, HSVCX, URCX  No results found for: EYECULTURE, GCCX, HSVCULTURE, LABHSV  No results found for: LEGIONELLA, MRSACX, MUMPSCX, MYCOPLASCX  No results found for: NOCARDIACX, STOOLCX  No results found for: THROATCX, UNSTIMCULT, URINECX,  CULTURE, VZVCULTUR  No results found for: VIRALCULTU, WOUNDCX        Radiology:  Imaging Results (Last 72 Hours)     Procedure Component Value Units Date/Time    XR Chest 1 View [[id number]] Collected: 08/12/22 0903     Updated: 08/12/22 0907    Narrative:      DATE OF EXAM: 8/12/2022 8:35 AM     PROCEDURE: XR CHEST 1 VW-     INDICATIONS: chest pain; S43.005A-Unspecified dislocation of left  shoulder joint, initial encounter; L03.116-Cellulitis of left lower  limb; Z78.9-Other specified health status     COMPARISON: 8/10/2022     TECHNIQUE: Single radiographic AP view of the chest was obtained.     FINDINGS:  Cardiac size is enlarged postoperative changes of CABG. A left-sided  transvenous pacemaker is in good position. The right internal jugular  line has been removed. A right-sided PICC line terminates in the SVC.  Left-sided transvenous pacemaker remains in appropriate position. There  is mild passive congestion present. There is improving aeration in the  lung bases compared with the last study.        Impression:      Cardiomegaly with evidence of at least mild passive congestion likely  early interstitial edema. Findings are suggestive of at least mild heart  failure. There is a decrease in atelectasis in the lung bases compared  with the last study.     This report was finalized on 8/12/2022 9:04 AM by Donny Cordoba MD.       XR Abdomen KUB [[id number]] Collected: 08/11/22 1515     Updated: 08/11/22 1521    Narrative:      Examination: XR ABDOMEN KUB-     Date of Exam: 8/11/2022 1:50 PM     Indication: following constipation, distended abd; S43.005A-Unspecified  dislocation of left shoulder joint, initial encounter;  L03.116-Cellulitis of left lower limb; Z78.9-Other specified health  status.     Comparison: 8/9/2022.     Technique: Single AP view of the abdomen was obtained radiographically.     Findings:  There appears to be a decreased pedal burden with normal colonic gas and  stool at this time. There are  phleboliths in the pelvis. There is  evidence of prior median sternotomy. There are mild degenerative changes  of both hips. There is no bowel distention. No pneumatosis intestinalis  or evidence of pneumoperitoneum. No pathologic abdominal calcifications.  Regional bones appear intact. No evidence of organomegaly or large  volume ascites.       Impression:      Normal colonic stool burden. No bowel distention.      This report was finalized on 8/11/2022 3:18 PM by Navneet Amin MD.       XR Chest 1 View [[id number]] Collected: 08/10/22 1642     Updated: 08/10/22 1646    Narrative:      XR CHEST 1 VW-     Date of Exam: 8/10/2022 4:26 PM     Indication: shortness of breath; S43.005A-Unspecified dislocation of  left shoulder joint, initial encounter; L03.116-Cellulitis of left lower  limb; Z78.9-Other specified health status.     Comparison Exams: August 10, 2022     Technique: Single AP chest radiograph     FINDINGS:  Median sternotomy wires appear intact. A left subclavian pacemaker is in  place. There are coarse bilateral reticular markings. The heart is  enlarged. A right-sided PICC has its tip at the cavoatrial junction.       Impression:      1.  Coarse bilateral reticular markings, which could reflect  interstitial pulmonary edema or atypical pneumonia.  2.  Cardiomegaly.     This report was finalized on 8/10/2022 4:43 PM by Jf Cadet MD.       XR Chest 1 View [[id number]] Collected: 08/10/22 1321     Updated: 08/10/22 1325    Narrative:      DATE OF EXAM:  8/10/2022 1:13 PM     PROCEDURE:  XR CHEST 1 VW-     INDICATIONS:  verify PICC; S43.005A-Unspecified dislocation of left shoulder joint,  initial encounter; L03.116-Cellulitis of left lower limb; Z78.9-Other  specified health status       COMPARISON:  AP portable chest 5/18/2022.     TECHNIQUE:   Single radiographic view of the chest was obtained.     FINDINGS:  Stable cardiac enlargement with CABG. Pacemaker device appears  unchanged. Right arm  approach PICC has been placed terminating at the  level of the mid SVC. There is no visible pneumothorax. Faint  interstitial thickening has developed throughout both lungs, which may  represent mild edema. No focal dense consolidations are identified.  There is no pleural effusion. No acute osseous abnormalities.       Impression:         1. Right arm approach PICC tip placement to the mid SVC level. No  visible pneumothorax.  2. Mild diffuse interstitial thickening in both lungs may represent mild  edema.  3. Stable cardiomegaly.     This report was finalized on 8/10/2022 1:22 PM by Praveena Pérez MD.       XR Abdomen KUB [[id number]] Collected: 08/09/22 1441     Updated: 08/09/22 1445    Narrative:      XR ABDOMEN KUB-     Date of Exam: 8/9/2022 2:25 PM     Indication: vomiting constipation; S43.005A-Unspecified dislocation of  left shoulder joint, initial encounter; L03.116-Cellulitis of left lower  limb; Z78.9-Other specified health status.     Comparison Exams: None available.     Technique: Single AP radiograph of the abdomen     FINDINGS:  There is a nonobstructive bowel gas pattern. A large amount of stool is  present throughout the colon. No pathological calcifications are  identified. The osseous structures appear intact.       Impression:      No acute process identified. Large amount of stool throughout colon.     This report was finalized on 8/9/2022 2:42 PM by Jf Cadet MD.           Estimated Creatinine Clearance: 46.6 mL/min (A) (by C-G formula based on SCr of 1.84 mg/dL (H)).      Impression:   Leukocytosis with neutrophilia, improved   Lactic acidosis  Left great toe wound- osteomyelitis by bone scan- amputation recommended by Dr. Márquez   Status post fall  Diabetes mellitus type 2  Left shoulder dislocation  Probable peripheral vascular disease  Acute renal failure    PLAN/RECOMMENDATIONS:           cont unasyn 3 g iv q8h  Defer CHF management to cardiology     Cont iv abx x 6 weeks form  8/2/22 (9/13/22)  Patient has improvement  D/w family    D/w hospitalist   picc placed  Anticipate placement at [facility]  Sage Braga MD  8/12/2022  14:00 EDT

## 2022-08-12 NOTE — PROGRESS NOTES
[facility] Medicine Services  PROGRESS NOTE    Patient Name: Jermaine Singh  : 1945  MRN: [id number]    Date of Admission: 2022  Primary Care Physician: Farooq Painter MD    Subjective   Subjective     CC:  F/u osteomyelitis    HPI:  Had c/o right inferior CP this AM that was resolved when I saw earlier this AM after was given morphine.  Per wife at bedside patient had a good BM last night and a small one today.  Per nurse he has been receiving to take several of his meds. Emesis x 1 this AM.      ROS:  Gen- No fevers, chills  CV- No chest pain, palpitations  Resp- No cough, +dyspnea  GI- +nausea/vomiting, no abd pain    Objective   Objective     Vital Signs:   Temp:  [97.6 °F (36.4 °C)-98.5 °F (36.9 °C)] 98.1 °F (36.7 °C)  Heart Rate:  [76-89] 78  Resp:  [18-22] 18  BP: (132-180)/() 145/73  Flow (L/min):  [35-40] 40     Physical Exam:  Constitutional: No acute distress, awake but drowsy/fell asleep while talking with me, in bed, looks ill, wife at bedside  HENT: NCAT, mucous membranes moist  Respiratory: Clear to auscultation bilaterally, respiratory effort normal, on 40% HFNC  Cardiovascular: RRR, no murmurs, rubs, or gallops, CP nonreproducible this AM  Gastrointestinal: soft, nontender, distended, +BS  Musculoskeletal: No bilateral ankle edema, LLE in boot  Psychiatric: Appropriate affect, cooperative  Neurologic: Oriented x 3, strength symmetric in all extremities, Cranial Nerves grossly intact to confrontation, speech clear  Skin: No rashes        Results Reviewed:  LAB RESULTS:      Lab 22  0636 22  0023 08/10/22  1638 22  1222   WBC 11.75* 15.41* 15.79* 9.19   HEMOGLOBIN 11.0* 11.8* 11.7* 12.1*   HEMATOCRIT 33.7* 35.7* 36.2* 35.5*   PLATELETS 250 252 309 240   NEUTROS ABS 9.88* 13.43*  --   --    IMMATURE GRANS (ABS) 0.06* 0.07*  --   --    LYMPHS ABS 0.76 0.60*  --   --    MONOS ABS 1.01* 1.26*  --   --    EOS ABS 0.00 0.00  --   --    MCV  90.6 89.0 90.5 87.2   PROCALCITONIN  --   --  0.17  --    LACTATE 1.2  --   --   --    PROTIME  --  13.6  --   --    APTT  --  33.1*  --   --    HEPARIN ANTI-XA 0.10* 0.10*  --   --          Lab 08/11/22  0636 08/10/22  1638 08/10/22  0607 08/09/22  0913 08/08/22  0708   SODIUM 145 145 145 145 137   POTASSIUM 4.1 4.5 4.3 4.2 4.2   CHLORIDE 106 108* 110* 111* 106   CO2 26.0 22.0 23.0 22.0 20.0*   ANION GAP 13.0 15.0 12.0 12.0 11.0   BUN 46* 43* 42* 39* 35*   CREATININE 1.80* 1.82* 1.86* 1.81* 1.70*   EGFR 38.3* 37.8* 36.8* 38.0* 41.0*   GLUCOSE 185* 236* 115* 130* 243*   CALCIUM 8.9 8.7 8.5* 9.2 8.6   MAGNESIUM 2.3 2.5*  --   --   --              Lab 08/11/22  0636 08/11/22  0023 08/10/22  2239 08/10/22  1638   PROBNP 30,799.0*  --   --  21,299.0*   TROPONIN T 0.146*  --  0.129* 0.108*   PROTIME  --  13.6  --   --    INR  --  1.05  --   --                  Lab 08/11/22  0041 08/10/22  1741 08/10/22  1633   PH, ARTERIAL 7.422 7.382 7.262*   PCO2, ARTERIAL 38.6 40.3 55.7*   PO2 ART 67.1* 106.0 50.7*   FIO2 32 60 80   HCO3 ART 25.2 23.9 25.1   BASE EXCESS ART 0.8 -1.1* -2.6*   CARBOXYHEMOGLOBIN 0.9 1.0 1.4     Brief Urine Lab Results  (Last result in the past 365 days)      Color   Clarity   Blood   Leuk Est   Nitrite   Protein   CREAT   Urine HCG        02/27/22 1321 Dark Yellow   Clear   Negative   Negative   Negative   2+                 Microbiology Results Abnormal     Procedure Component Value - Date/Time    Anaerobic Culture - Tissue, Toe, Left [[id number]] Collected: 08/02/22 1027    Lab Status: Final result Specimen: Tissue from Toe, Left Updated: 08/07/22 0858     Anaerobic Culture No anaerobes isolated at 5 days    Blood Culture - Blood, Hand, Left [[id number]]  (Normal) Collected: 07/27/22 2040    Lab Status: Final result Specimen: Blood from Hand, Left Updated: 08/01/22 2132     Blood Culture No growth at 5 days    Blood Culture - Blood, Hand, Right [[id number]]  (Normal) Collected: 07/27/22 2030    Lab  Status: Final result Specimen: Blood from Hand, Right Updated: 08/01/22 2132     Blood Culture No growth at 5 days    COVID PRE-OP / PRE-PROCEDURE SCREENING ORDER (NO ISOLATION) - Swab, Nasopharynx [[id number]]  (Normal) Collected: 07/27/22 2246    Lab Status: Final result Specimen: Swab from Nasopharynx Updated: 07/27/22 2357    Narrative:      The following orders were created for panel order COVID PRE-OP / PRE-PROCEDURE SCREENING ORDER (NO ISOLATION) - Swab, Nasopharynx.  Procedure                               Abnormality         Status                     ---------                               -----------         ------                     COVID-19 and FLU A/B PCR...[[id number]]  Normal              Final result                 Please view results for these tests on the individual orders.    COVID-19 and FLU A/B PCR - Swab, Nasopharynx [[id number]]  (Normal) Collected: 07/27/22 2246    Lab Status: Final result Specimen: Swab from Nasopharynx Updated: 07/27/22 2357     COVID19 Not Detected     Influenza A PCR Not Detected     Influenza B PCR Not Detected    Narrative:      Fact sheet for providers: https://www.fda.gov/media/560980/download    Fact sheet for patients: https://www.fda.gov/media/594470/download    Test performed by PCR.          Adult Transthoracic Echo Complete W/ Cont if Necessary Per Protocol    Result Date: 8/12/2022  · Left ventricular ejection fraction appears to be 46 - 50%. Left ventricular systolic function is low normal. · Left ventricular septal hypokinesis · No significant valvular heart disease      XR Chest 1 View    Result Date: 8/12/2022  DATE OF EXAM: 8/12/2022 8:35 AM  PROCEDURE: XR CHEST 1 VW-  INDICATIONS: chest pain; S43.005A-Unspecified dislocation of left shoulder joint, initial encounter; L03.116-Cellulitis of left lower limb; Z78.9-Other specified health status  COMPARISON: 8/10/2022  TECHNIQUE: Single radiographic AP view of the chest was obtained.  FINDINGS: Cardiac size is  enlarged postoperative changes of CABG. A left-sided transvenous pacemaker is in good position. The right internal jugular line has been removed. A right-sided PICC line terminates in the SVC. Left-sided transvenous pacemaker remains in appropriate position. There is mild passive congestion present. There is improving aeration in the lung bases compared with the last study.      Impression: Cardiomegaly with evidence of at least mild passive congestion likely early interstitial edema. Findings are suggestive of at least mild heart failure. There is a decrease in atelectasis in the lung bases compared with the last study.  This report was finalized on 8/12/2022 9:04 AM by Donny Cordoba MD.      XR Chest 1 View    Result Date: 8/10/2022  XR CHEST 1 VW-  Date of Exam: 8/10/2022 4:26 PM  Indication: shortness of breath; S43.005A-Unspecified dislocation of left shoulder joint, initial encounter; L03.116-Cellulitis of left lower limb; Z78.9-Other specified health status.  Comparison Exams: August 10, 2022  Technique: Single AP chest radiograph  FINDINGS: Median sternotomy wires appear intact. A left subclavian pacemaker is in place. There are coarse bilateral reticular markings. The heart is enlarged. A right-sided PICC has its tip at the cavoatrial junction.      Impression: 1.  Coarse bilateral reticular markings, which could reflect interstitial pulmonary edema or atypical pneumonia. 2.  Cardiomegaly.  This report was finalized on 8/10/2022 4:43 PM by Jf Cadet MD.      XR Chest 1 View    Result Date: 8/10/2022  DATE OF EXAM: 8/10/2022 1:13 PM  PROCEDURE: XR CHEST 1 VW-  INDICATIONS: verify PICC; S43.005A-Unspecified dislocation of left shoulder joint, initial encounter; L03.116-Cellulitis of left lower limb; Z78.9-Other specified health status   COMPARISON: AP portable chest 5/18/2022.  TECHNIQUE: Single radiographic view of the chest was obtained.  FINDINGS: Stable cardiac enlargement with CABG. Pacemaker device  appears unchanged. Right arm approach PICC has been placed terminating at the level of the mid SVC. There is no visible pneumothorax. Faint interstitial thickening has developed throughout both lungs, which may represent mild edema. No focal dense consolidations are identified. There is no pleural effusion. No acute osseous abnormalities.      Impression:  1. Right arm approach PICC tip placement to the mid SVC level. No visible pneumothorax. 2. Mild diffuse interstitial thickening in both lungs may represent mild edema. 3. Stable cardiomegaly.  This report was finalized on 8/10/2022 1:22 PM by Praveena Pérez MD.      XR Abdomen KUB    Result Date: 8/11/2022  Examination: XR ABDOMEN KUB-  Date of Exam: 8/11/2022 1:50 PM  Indication: following constipation, distended abd; S43.005A-Unspecified dislocation of left shoulder joint, initial encounter; L03.116-Cellulitis of left lower limb; Z78.9-Other specified health status.  Comparison: 8/9/2022.  Technique: Single AP view of the abdomen was obtained radiographically.  Findings: There appears to be a decreased pedal burden with normal colonic gas and stool at this time. There are phleboliths in the pelvis. There is evidence of prior median sternotomy. There are mild degenerative changes of both hips. There is no bowel distention. No pneumatosis intestinalis or evidence of pneumoperitoneum. No pathologic abdominal calcifications. Regional bones appear intact. No evidence of organomegaly or large volume ascites.      Impression: Normal colonic stool burden. No bowel distention.  This report was finalized on 8/11/2022 3:18 PM by Navneet Amin MD.        Results for orders placed during the hospital encounter of 07/27/22    Adult Transthoracic Echo Complete W/ Cont if Necessary Per Protocol    Interpretation Summary  · Left ventricular ejection fraction appears to be 46 - 50%. Left ventricular systolic function is low normal.  · Left ventricular septal hypokinesis  · No  significant valvular heart disease      I have reviewed the medications:  Scheduled Meds:amLODIPine, 10 mg, Oral, Daily  ampicillin-sulbactam, 3 g, Intravenous, Q8H  aspirin, 81 mg, Oral, Daily  cetirizine, 5 mg, Oral, Daily  insulin detemir, 30 Units, Subcutaneous, Nightly  insulin lispro, 0-7 Units, Subcutaneous, TID AC  Insulin Lispro, 5 Units, Subcutaneous, TID With Meals  levothyroxine, 50 mcg, Oral, Q AM  metoprolol succinate XL, 50 mg, Oral, Q24H  polyethylene glycol, 17 g, Oral, Daily  sodium chloride, 10 mL, Intravenous, Q12H  sodium chloride, 10 mL, Intravenous, Q12H  vitamin D3, 5,000 Units, Oral, Daily      Continuous Infusions:   PRN Meds:.•  acetaminophen **OR** acetaminophen **OR** acetaminophen  •  albuterol  •  bisacodyl  •  dextrose  •  dextrose  •  glucagon (human recombinant)  •  HYDROcodone-acetaminophen  •  ondansetron **OR** ondansetron  •  phenol  •  prochlorperazine  •  sennosides-docusate  •  [COMPLETED] Insert peripheral IV **AND** sodium chloride  •  sodium chloride  •  sodium chloride    Assessment & Plan   Assessment & Plan     Active Hospital Problems    Diagnosis  POA   • Proximal phalanx fracture of the second digit extending into the second metatarsal joint [S92.919A]  Unknown   • Dislocation of left shoulder joint, initial encounter [S43.005A]  Yes   • Cellulitis in diabetic foot ([facility]) [E11.628, L03.119]  Yes   • Fall [W19.XXXA]  Yes   • Sepsis ([facility]) [A41.9]  Yes   • Leukocytosis [D72.829]  Yes   • Lactic acidosis [E87.2]  Yes   • Elevated C-reactive protein (CRP) [R79.82]  Yes   • Elevated sed rate (elev SR) [R70.0]  Yes   • SSS (sick sinus syndrome) ([facility]) [I49.5]  Yes   • Hyperlipidemia [E78.5]  Yes   • Hypertension [I10]  Yes   • S/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 Au [Z95.1]  Not Applicable   • Hypothyroidism (acquired) [E03.9]  Yes   • Type 2 diabetes mellitus ([facility]) [E11.9]  Yes      Resolved Hospital Problems   No resolved problems to display.        Brief Hospital Course  to date:  Jermaine Singh is a 77 y.o. male with PMH significant for HTN, HLD, CAD s/p CABG, PVD s/p RLE stent, CKD III, insulin-dependent DMII and hypothyroidism. He was admitted to [facility] 7/27/22 for L shoulder dislocation after a fall as well as sepsis secondary to L great toe osteomyelitis.     L great toe cellulitis / osteomyelitis  Peripheral vascular disease   - Followed by Dr. Joseph of podiatry - was on PO Doxycycline outpatient  - Bone scan concerning for osteomyelitis of L great toe and he is s/p L great toe amputation by Dr. Márquez 8/2/22   - arterial duplex obtained. Cardiology does not recommend revascularization at this time   - tissue culture growing enterococcus faecalis and enterococcus casseliflavus  - ID following - changed to Unasyn, will need 6 weeks from 8/2/22 (9/13/2022)  - S/p PICC    Acute Respiratory Failure  --?d/t CHF.  Cards diuresing, gave bumex 2mg today.  Echo showed EF 46-50%  --requiring HFNC to maintain sats  --COVID negative on 7/27/2022, repeat swab  --would like to r/o PE but can't do CTA d/t elevated creatinine. Will check CT chest without contrast and check VQ scan as well as LE duplex.           Fall   Anterior L shoulder dislocation  - s/p reduction in the ED  - Appreciate ortho (Dr. Gipson) assistance   - Recommend non-operative treatment. Sling for comfort.   - PT for range of motion exercises, avoid Abduction and external rotation. WBAT on LUE with walker or knee scooter  - Follow up with Dr. Gipson in 2-3 weeks     Elevated Troponin  Acute CHF  --cards following, likely demand ischemia d/t acute CHF per cards.  Stopped heparin gtt, plans diuresis.  --Echo EF 46-50%, LV septal HK, no significant VHD      Constipation  Nausea/Vomiting  - KUB with large stool burden otherwise no acute process present  - IVF, zofran and compazine PRN  - aggressive bowel regimen  - Improved as had BM yesterday, but eating very poorly per wife so will give Mag citrate to  "finish \"clean out\" and encourage PO     Insulin-dependent DMII  - Hgb A1c 9.0%  - Continue Levemir 30 units QHS and Lispro to 5 units TID AC + SSI      [place] - worsening  - Baseline creatinine appears to be 0.9-1.2  - IVF was restarted a few days ago but ?flash pulmonary edema over night so IVF stopped and now diuresising.  Will repeat BMP in AM.       Hypertension  Coronary artery disease s/p CABG  SSS s/p PPM   - Cardiology has stopped ACEI and added Amlodipine 10mg daily   - Continue Metoprolol XL 50mg daily   - scheduled for 12/28/2022 1:30 pm with device check and should keep that appt.     Hypothyroid  - Continue levothyroxine    Expected Discharge Location and Transportation: rehab/[facility]   Expected Discharge Date: 8/13    DVT prophylaxis:  Mechanical DVT prophylaxis orders are present.     AM-PAC 6 Clicks Score (PT): 12 (08/11/22 1320)     Updated wife at bedside on 8/11/2022    CODE STATUS:   Code Status and Medical Interventions:   Ordered at: 07/28/22 0000     Code Status (Patient has no pulse and is not breathing):    CPR (Attempt to Resuscitate)     Medical Interventions (Patient has pulse or is breathing):    Full Support       Reilly Thomas MD  08/12/22              "

## 2022-08-12 NOTE — PLAN OF CARE
Goal Outcome Evaluation:  Plan of Care Reviewed With: patient        Progress: improving  Outcome Evaluation: Pt continues to be limited by lethargy. Decreased communcation due to SOA noted this date. Pt communcating with hand gestures or single words. Pt rolled L to R in bed for sling placement with minAx1. Not appropriate to attempt stand pivot transfer due to inabiltiy to maintain WB status. Pt stood from chair with modAx2 and use of RW. Max verbal and tactile cues to maintain NWB status of L LE. Pt maxA for hair care and washed face with setup. Pt completed AAROM/AROM x10 reps shoulder/elbow/wrist/hand L UE. Recommend d/c to [facility] when medically ready.

## 2022-08-13 ENCOUNTER — APPOINTMENT (OUTPATIENT)
Dept: CARDIOLOGY | Facility: HOSPITAL | Age: 77
End: 2022-08-13

## 2022-08-13 LAB
ALBUMIN SERPL-MCNC: 2.9 G/DL (ref 3.5–5.2)
ALBUMIN/GLOB SERPL: 0.9 G/DL
ALP SERPL-CCNC: 60 U/L (ref 39–117)
ALT SERPL W P-5'-P-CCNC: 13 U/L (ref 1–41)
ANION GAP SERPL CALCULATED.3IONS-SCNC: 11 MMOL/L (ref 5–15)
ANION GAP SERPL CALCULATED.3IONS-SCNC: 13 MMOL/L (ref 5–15)
APTT PPP: 29.7 SECONDS (ref 60–90)
AST SERPL-CCNC: 12 U/L (ref 1–40)
BASOPHILS # BLD AUTO: 0.05 10*3/MM3 (ref 0–0.2)
BASOPHILS # BLD AUTO: 0.06 10*3/MM3 (ref 0–0.2)
BASOPHILS NFR BLD AUTO: 0.3 % (ref 0–1.5)
BASOPHILS NFR BLD AUTO: 0.4 % (ref 0–1.5)
BILIRUB SERPL-MCNC: 0.3 MG/DL (ref 0–1.2)
BUN SERPL-MCNC: 51 MG/DL (ref 8–23)
BUN SERPL-MCNC: 51 MG/DL (ref 8–23)
BUN/CREAT SERPL: 24.6 (ref 7–25)
BUN/CREAT SERPL: 26.3 (ref 7–25)
CALCIUM SPEC-SCNC: 8.4 MG/DL (ref 8.6–10.5)
CALCIUM SPEC-SCNC: 8.5 MG/DL (ref 8.6–10.5)
CHLORIDE SERPL-SCNC: 104 MMOL/L (ref 98–107)
CHLORIDE SERPL-SCNC: 99 MMOL/L (ref 98–107)
CO2 SERPL-SCNC: 27 MMOL/L (ref 22–29)
CO2 SERPL-SCNC: 28 MMOL/L (ref 22–29)
CREAT SERPL-MCNC: 1.94 MG/DL (ref 0.76–1.27)
CREAT SERPL-MCNC: 2.07 MG/DL (ref 0.76–1.27)
DEPRECATED RDW RBC AUTO: 41.3 FL (ref 37–54)
DEPRECATED RDW RBC AUTO: 41.7 FL (ref 37–54)
EGFRCR SERPLBLD CKD-EPI 2021: 32.4 ML/MIN/1.73
EGFRCR SERPLBLD CKD-EPI 2021: 35 ML/MIN/1.73
EOSINOPHIL # BLD AUTO: 0.01 10*3/MM3 (ref 0–0.4)
EOSINOPHIL # BLD AUTO: 0.04 10*3/MM3 (ref 0–0.4)
EOSINOPHIL NFR BLD AUTO: 0.1 % (ref 0.3–6.2)
EOSINOPHIL NFR BLD AUTO: 0.3 % (ref 0.3–6.2)
ERYTHROCYTE [DISTWIDTH] IN BLOOD BY AUTOMATED COUNT: 12.9 % (ref 12.3–15.4)
ERYTHROCYTE [DISTWIDTH] IN BLOOD BY AUTOMATED COUNT: 12.9 % (ref 12.3–15.4)
GLOBULIN UR ELPH-MCNC: 3.2 GM/DL
GLUCOSE BLDC GLUCOMTR-MCNC: 117 MG/DL (ref 70–130)
GLUCOSE BLDC GLUCOMTR-MCNC: 144 MG/DL (ref 70–130)
GLUCOSE BLDC GLUCOMTR-MCNC: 163 MG/DL (ref 70–130)
GLUCOSE BLDC GLUCOMTR-MCNC: 59 MG/DL (ref 70–130)
GLUCOSE BLDC GLUCOMTR-MCNC: 88 MG/DL (ref 70–130)
GLUCOSE SERPL-MCNC: 151 MG/DL (ref 65–99)
GLUCOSE SERPL-MCNC: 63 MG/DL (ref 65–99)
HCT VFR BLD AUTO: 31.2 % (ref 37.5–51)
HCT VFR BLD AUTO: 31.4 % (ref 37.5–51)
HGB BLD-MCNC: 10.2 G/DL (ref 13–17.7)
HGB BLD-MCNC: 10.5 G/DL (ref 13–17.7)
IMM GRANULOCYTES # BLD AUTO: 0.05 10*3/MM3 (ref 0–0.05)
IMM GRANULOCYTES # BLD AUTO: 0.06 10*3/MM3 (ref 0–0.05)
IMM GRANULOCYTES NFR BLD AUTO: 0.3 % (ref 0–0.5)
IMM GRANULOCYTES NFR BLD AUTO: 0.4 % (ref 0–0.5)
INR PPP: 1.07 (ref 0.84–1.13)
LYMPHOCYTES # BLD AUTO: 1.06 10*3/MM3 (ref 0.7–3.1)
LYMPHOCYTES # BLD AUTO: 1.13 10*3/MM3 (ref 0.7–3.1)
LYMPHOCYTES NFR BLD AUTO: 7.4 % (ref 19.6–45.3)
LYMPHOCYTES NFR BLD AUTO: 8 % (ref 19.6–45.3)
MAGNESIUM SERPL-MCNC: 2.7 MG/DL (ref 1.6–2.4)
MCH RBC QN AUTO: 29.2 PG (ref 26.6–33)
MCH RBC QN AUTO: 29.5 PG (ref 26.6–33)
MCHC RBC AUTO-ENTMCNC: 32.7 G/DL (ref 31.5–35.7)
MCHC RBC AUTO-ENTMCNC: 33.4 G/DL (ref 31.5–35.7)
MCV RBC AUTO: 88.2 FL (ref 79–97)
MCV RBC AUTO: 89.4 FL (ref 79–97)
MONOCYTES # BLD AUTO: 1.17 10*3/MM3 (ref 0.1–0.9)
MONOCYTES # BLD AUTO: 1.17 10*3/MM3 (ref 0.1–0.9)
MONOCYTES NFR BLD AUTO: 8.1 % (ref 5–12)
MONOCYTES NFR BLD AUTO: 8.3 % (ref 5–12)
NEUTROPHILS NFR BLD AUTO: 11.61 10*3/MM3 (ref 1.7–7)
NEUTROPHILS NFR BLD AUTO: 12.08 10*3/MM3 (ref 1.7–7)
NEUTROPHILS NFR BLD AUTO: 82.6 % (ref 42.7–76)
NEUTROPHILS NFR BLD AUTO: 83.8 % (ref 42.7–76)
NRBC BLD AUTO-RTO: 0 /100 WBC (ref 0–0.2)
NRBC BLD AUTO-RTO: 0 /100 WBC (ref 0–0.2)
NT-PROBNP SERPL-MCNC: ABNORMAL PG/ML (ref 0–1800)
PLATELET # BLD AUTO: 209 10*3/MM3 (ref 140–450)
PLATELET # BLD AUTO: 226 10*3/MM3 (ref 140–450)
PMV BLD AUTO: 11.7 FL (ref 6–12)
PMV BLD AUTO: 12.4 FL (ref 6–12)
POTASSIUM SERPL-SCNC: 3.8 MMOL/L (ref 3.5–5.2)
POTASSIUM SERPL-SCNC: 4 MMOL/L (ref 3.5–5.2)
PROT SERPL-MCNC: 6.1 G/DL (ref 6–8.5)
PROTHROMBIN TIME: 13.8 SECONDS (ref 11.4–14.4)
RBC # BLD AUTO: 3.49 10*6/MM3 (ref 4.14–5.8)
RBC # BLD AUTO: 3.56 10*6/MM3 (ref 4.14–5.8)
SODIUM SERPL-SCNC: 139 MMOL/L (ref 136–145)
SODIUM SERPL-SCNC: 143 MMOL/L (ref 136–145)
TROPONIN T SERPL-MCNC: 0.23 NG/ML (ref 0–0.03)
UFH PPP CHRO-ACNC: 0.1 IU/ML (ref 0.3–0.7)
UFH PPP CHRO-ACNC: 0.37 IU/ML (ref 0.3–0.7)
WBC NRBC COR # BLD: 14.07 10*3/MM3 (ref 3.4–10.8)
WBC NRBC COR # BLD: 14.42 10*3/MM3 (ref 3.4–10.8)

## 2022-08-13 PROCEDURE — 85610 PROTHROMBIN TIME: CPT | Performed by: INTERNAL MEDICINE

## 2022-08-13 PROCEDURE — 80053 COMPREHEN METABOLIC PANEL: CPT | Performed by: PHYSICIAN ASSISTANT

## 2022-08-13 PROCEDURE — 25010000002 HEPARIN (PORCINE) 25000-0.45 UT/250ML-% SOLUTION: Performed by: INTERNAL MEDICINE

## 2022-08-13 PROCEDURE — 82962 GLUCOSE BLOOD TEST: CPT

## 2022-08-13 PROCEDURE — 63710000001 INSULIN LISPRO (HUMAN) PER 5 UNITS: Performed by: PHYSICIAN ASSISTANT

## 2022-08-13 PROCEDURE — 83735 ASSAY OF MAGNESIUM: CPT | Performed by: NURSE PRACTITIONER

## 2022-08-13 PROCEDURE — 83880 ASSAY OF NATRIURETIC PEPTIDE: CPT | Performed by: NURSE PRACTITIONER

## 2022-08-13 PROCEDURE — 93970 EXTREMITY STUDY: CPT

## 2022-08-13 PROCEDURE — 84484 ASSAY OF TROPONIN QUANT: CPT | Performed by: INTERNAL MEDICINE

## 2022-08-13 PROCEDURE — 99233 SBSQ HOSP IP/OBS HIGH 50: CPT | Performed by: INTERNAL MEDICINE

## 2022-08-13 PROCEDURE — 93005 ELECTROCARDIOGRAM TRACING: CPT | Performed by: NURSE PRACTITIONER

## 2022-08-13 PROCEDURE — 84484 ASSAY OF TROPONIN QUANT: CPT | Performed by: NURSE PRACTITIONER

## 2022-08-13 PROCEDURE — 85730 THROMBOPLASTIN TIME PARTIAL: CPT | Performed by: INTERNAL MEDICINE

## 2022-08-13 PROCEDURE — 63710000001 INSULIN DETEMIR PER 5 UNITS: Performed by: INTERNAL MEDICINE

## 2022-08-13 PROCEDURE — 94799 UNLISTED PULMONARY SVC/PX: CPT

## 2022-08-13 PROCEDURE — P9612 CATHETERIZE FOR URINE SPEC: HCPCS

## 2022-08-13 PROCEDURE — 99232 SBSQ HOSP IP/OBS MODERATE 35: CPT | Performed by: PHYSICIAN ASSISTANT

## 2022-08-13 PROCEDURE — 99024 POSTOP FOLLOW-UP VISIT: CPT | Performed by: THORACIC SURGERY (CARDIOTHORACIC VASCULAR SURGERY)

## 2022-08-13 PROCEDURE — 94761 N-INVAS EAR/PLS OXIMETRY MLT: CPT

## 2022-08-13 PROCEDURE — 25010000002 HEPARIN (PORCINE) PER 1000 UNITS: Performed by: INTERNAL MEDICINE

## 2022-08-13 PROCEDURE — 97530 THERAPEUTIC ACTIVITIES: CPT

## 2022-08-13 PROCEDURE — 85520 HEPARIN ASSAY: CPT | Performed by: INTERNAL MEDICINE

## 2022-08-13 PROCEDURE — 25010000002 PROCHLORPERAZINE 10 MG/2ML SOLUTION: Performed by: INTERNAL MEDICINE

## 2022-08-13 PROCEDURE — 93010 ELECTROCARDIOGRAM REPORT: CPT | Performed by: INTERNAL MEDICINE

## 2022-08-13 PROCEDURE — 94664 DEMO&/EVAL PT USE INHALER: CPT

## 2022-08-13 PROCEDURE — 97110 THERAPEUTIC EXERCISES: CPT

## 2022-08-13 PROCEDURE — 85025 COMPLETE CBC W/AUTO DIFF WBC: CPT | Performed by: INTERNAL MEDICINE

## 2022-08-13 PROCEDURE — 25010000002 AMPICILLIN-SULBACTAM PER 1.5 G: Performed by: INTERNAL MEDICINE

## 2022-08-13 PROCEDURE — 85025 COMPLETE CBC W/AUTO DIFF WBC: CPT | Performed by: NURSE PRACTITIONER

## 2022-08-13 PROCEDURE — 85520 HEPARIN ASSAY: CPT

## 2022-08-13 RX ORDER — HEPARIN SODIUM 1000 [USP'U]/ML
2000 INJECTION, SOLUTION INTRAVENOUS; SUBCUTANEOUS AS NEEDED
Status: DISCONTINUED | OUTPATIENT
Start: 2022-08-13 | End: 2022-08-14

## 2022-08-13 RX ORDER — HEPARIN SODIUM 10000 [USP'U]/100ML
15 INJECTION, SOLUTION INTRAVENOUS
Status: DISCONTINUED | OUTPATIENT
Start: 2022-08-13 | End: 2022-08-14

## 2022-08-13 RX ORDER — AMOXICILLIN 250 MG
2 CAPSULE ORAL 2 TIMES DAILY
Status: DISCONTINUED | OUTPATIENT
Start: 2022-08-13 | End: 2022-08-26 | Stop reason: HOSPADM

## 2022-08-13 RX ORDER — MAGNESIUM CARB/ALUMINUM HYDROX 105-160MG
296 TABLET,CHEWABLE ORAL ONCE
Status: COMPLETED | OUTPATIENT
Start: 2022-08-13 | End: 2022-08-13

## 2022-08-13 RX ORDER — HEPARIN SODIUM 1000 [USP'U]/ML
4000 INJECTION, SOLUTION INTRAVENOUS; SUBCUTANEOUS AS NEEDED
Status: DISCONTINUED | OUTPATIENT
Start: 2022-08-13 | End: 2022-08-14

## 2022-08-13 RX ORDER — HEPARIN SODIUM 1000 [USP'U]/ML
8000 INJECTION, SOLUTION INTRAVENOUS; SUBCUTANEOUS ONCE
Status: COMPLETED | OUTPATIENT
Start: 2022-08-13 | End: 2022-08-13

## 2022-08-13 RX ADMIN — Medication 10 ML: at 09:09

## 2022-08-13 RX ADMIN — ASPIRIN 81 MG CHEWABLE TABLET 81 MG: 81 TABLET CHEWABLE at 09:09

## 2022-08-13 RX ADMIN — Medication 10 ML: at 21:03

## 2022-08-13 RX ADMIN — AMLODIPINE BESYLATE 10 MG: 10 TABLET ORAL at 09:09

## 2022-08-13 RX ADMIN — SENNOSIDES AND DOCUSATE SODIUM 2 TABLET: 50; 8.6 TABLET ORAL at 21:02

## 2022-08-13 RX ADMIN — HYDROCODONE BITARTRATE AND ACETAMINOPHEN 1 TABLET: 5; 325 TABLET ORAL at 21:02

## 2022-08-13 RX ADMIN — NITROGLYCERIN 0.4 MG: 0.4 TABLET SUBLINGUAL at 21:57

## 2022-08-13 RX ADMIN — NITROGLYCERIN 0.4 MG: 0.4 TABLET SUBLINGUAL at 22:06

## 2022-08-13 RX ADMIN — INSULIN LISPRO 2 UNITS: 100 INJECTION, SOLUTION INTRAVENOUS; SUBCUTANEOUS at 17:01

## 2022-08-13 RX ADMIN — POLYETHYLENE GLYCOL 3350 17 G: 17 POWDER, FOR SOLUTION ORAL at 09:09

## 2022-08-13 RX ADMIN — Medication 5000 UNITS: at 09:09

## 2022-08-13 RX ADMIN — Medication 296 ML: at 17:01

## 2022-08-13 RX ADMIN — SODIUM CHLORIDE 3 G: 900 INJECTION INTRAVENOUS at 16:58

## 2022-08-13 RX ADMIN — Medication 10 ML: at 13:32

## 2022-08-13 RX ADMIN — ALBUTEROL SULFATE 2.5 MG: 2.5 SOLUTION RESPIRATORY (INHALATION) at 21:08

## 2022-08-13 RX ADMIN — LEVOTHYROXINE SODIUM 50 MCG: 50 TABLET ORAL at 05:03

## 2022-08-13 RX ADMIN — HEPARIN SODIUM 15 UNITS/KG/HR: 10000 INJECTION, SOLUTION INTRAVENOUS at 13:28

## 2022-08-13 RX ADMIN — ALBUTEROL SULFATE 2.5 MG: 2.5 SOLUTION RESPIRATORY (INHALATION) at 13:05

## 2022-08-13 RX ADMIN — PROCHLORPERAZINE EDISYLATE 5 MG: 5 INJECTION INTRAMUSCULAR; INTRAVENOUS at 19:55

## 2022-08-13 RX ADMIN — METOPROLOL SUCCINATE 50 MG: 50 TABLET, EXTENDED RELEASE ORAL at 09:09

## 2022-08-13 RX ADMIN — HEPARIN SODIUM 5000 UNITS: 5000 INJECTION INTRAVENOUS; SUBCUTANEOUS at 05:03

## 2022-08-13 RX ADMIN — HEPARIN SODIUM 8000 UNITS: 1000 INJECTION, SOLUTION INTRAVENOUS; SUBCUTANEOUS at 13:28

## 2022-08-13 RX ADMIN — SODIUM CHLORIDE 3 G: 900 INJECTION INTRAVENOUS at 09:09

## 2022-08-13 RX ADMIN — CETIRIZINE HYDROCHLORIDE 5 MG: 10 TABLET, FILM COATED ORAL at 09:09

## 2022-08-13 RX ADMIN — INSULIN DETEMIR 12 UNITS: 100 INJECTION, SOLUTION SUBCUTANEOUS at 21:02

## 2022-08-13 RX ADMIN — Medication 10 ML: at 22:07

## 2022-08-13 RX ADMIN — PROCHLORPERAZINE EDISYLATE 5 MG: 5 INJECTION INTRAMUSCULAR; INTRAVENOUS at 09:09

## 2022-08-13 RX ADMIN — HYDROCODONE BITARTRATE AND ACETAMINOPHEN 1 TABLET: 5; 325 TABLET ORAL at 09:19

## 2022-08-13 NOTE — PLAN OF CARE
Goal Outcome Evaluation:      Started the morning off with chest pain on Right accompanied with moderate amount of emesis, eased off after morphine, another episode chest pain on Left at lunch, resolved after lortab, nausea controlled with compazine, High karrie 02 stopped this pm, pt placed on 6L sating in mid 90s, denies SOA, very small BM this evening after mag citrate, 2mg of Bumex given this am, pt voided 325ml on his own, did require I/O cath for 600ml this evening, worked well with therapy, up in chair for about an hr. BP stable, paced on tele

## 2022-08-13 NOTE — PROGRESS NOTES
Cardiothoracic Surgery Progress Note      POD #: 11-left great toe transmetatarsal amputation primary closure     LOS: 17 days      Subjective: Awake alert on CPAP mask.  Had some chest pain last night    Objective:  Vital Signs vital signs below noted T-max past 24 hours 98.5 °F  Temp:  [97.8 °F (36.6 °C)-98.5 °F (36.9 °C)] 98.5 °F (36.9 °C)  Heart Rate:  [68-89] 68  Resp:  [16-20] 20  BP: (123-180)/(72-88) 150/74    Physical Exam:   General Appearance: Awake and alert   Lungs:   Heart:   Skin:   Incision: Amputation site dressing change.  Suture intact skin margin viable skin flaps are viable.     Results: White blood cell count 13,700 hematocrit 35 creatinine 1.84 BUN 47  Results from last 7 days   Lab Units 08/12/22  0941   WBC 10*3/mm3 13.76*   HEMOGLOBIN g/dL 11.2*   HEMATOCRIT % 35.5*   PLATELETS 10*3/mm3 236     Results from last 7 days   Lab Units 08/12/22  0941   SODIUM mmol/L 146*   POTASSIUM mmol/L 4.2   CHLORIDE mmol/L 103   CO2 mmol/L 26.0   BUN mg/dL 47*   CREATININE mg/dL 1.84*   GLUCOSE mg/dL 206*   CALCIUM mg/dL 8.8         Assessment:  #1.  Postop day 11 left great toe transmetatarsal amputation primary closure healing well  2 status post remote chordee bypass grafting  3 status post remote pacemaker placement for sick sinus syndrome  4 recent fall left shoulder dislocation reduced in the emergency department.  5.p endovascular stenting angioplasty right lower extremity per Dr. Hernandez 2019  6.  Place pulmonary edema.  Possible cardiac ischemic related    Plan: Overall medical manage per hospitalist and cardiologist.  We will continue change at daily dressing changes to the amputation site.  Antibiotics per infectious disease.      Gopal Márquez MD - 08/13/22 - 06:00 EDT

## 2022-08-13 NOTE — PLAN OF CARE
Goal Outcome Evaluation:      Weaned to 5L NC overnight. No c/o chest pain or nausea. PRN suppository administered with no BM, but pt does report increase in flatus. Normoactive bowel sounds auscultated. Pt oliguric with only 200 mL dark, concentrated urine produced. Bladder scan revealed ~400 mLs. WCTM. Wife at bedside.

## 2022-08-13 NOTE — PROGRESS NOTES
[facility] Medicine Services  PROGRESS NOTE    Patient Name: Jermaine Singh  : 1945  MRN: [id number]    Date of Admission: 2022  Primary Care Physician: Farooq Painter MD    Subjective   Subjective     CC:  F/u osteomyelitis    HPI:  Feels better today.  Breathing better today.  Weaned off HFNC down to 5LNC    ROS:  Gen- No fevers, chills  CV- No chest pain, palpitations  Resp- No cough, +dyspnea  GI- no n/v/abdominal pain    Objective   Objective     Vital Signs:   Temp:  [97.8 °F (36.6 °C)-98.5 °F (36.9 °C)] 98.2 °F (36.8 °C)  Heart Rate:  [64-84] 74  Resp:  [16-20] 18  BP: (132-162)/(72-89) 160/89  Flow (L/min):  [5-40] 5     Physical Exam:  Constitutional: No acute distress, awake and alert, sitting up in chair, looks better today  HENT: NCAT, mucous membranes moist  Respiratory: Clear to auscultation bilaterally, respiratory effort normal, on 5LNC  Cardiovascular: RRR, no murmurs, rubs, or gallops, CP nonreproducible this AM  Gastrointestinal: soft, nontender, distended, +BS  Musculoskeletal: No bilateral ankle edema, LLE in boot  Psychiatric: Appropriate affect, cooperative  Neurologic: Oriented x 3, strength symmetric in all extremities, Cranial Nerves grossly intact to confrontation, speech clear  Skin: No rashes        Results Reviewed:  LAB RESULTS:      Lab 22  0941 22  0636 22  0023 08/10/22  1638 22  1222   WBC 13.76* 11.75* 15.41* 15.79* 9.19   HEMOGLOBIN 11.2* 11.0* 11.8* 11.7* 12.1*   HEMATOCRIT 35.5* 33.7* 35.7* 36.2* 35.5*   PLATELETS 236 250 252 309 240   NEUTROS ABS  --  9.88* 13.43*  --   --    IMMATURE GRANS (ABS)  --  0.06* 0.07*  --   --    LYMPHS ABS  --  0.76 0.60*  --   --    MONOS ABS  --  1.01* 1.26*  --   --    EOS ABS  --  0.00 0.00  --   --    MCV 93.4 90.6 89.0 90.5 87.2   PROCALCITONIN  --   --   --  0.17  --    LACTATE  --  1.2  --   --   --    PROTIME  --   --  13.6  --   --    APTT  --   --  33.1*  --   --     HEPARIN ANTI-XA  --  0.10* 0.10*  --   --          Lab 08/13/22  0730 08/12/22  1018 08/12/22  0941 08/11/22  0636 08/10/22  1638 08/10/22  0607   SODIUM 143  --  146* 145 145 145   POTASSIUM 3.8  --  4.2 4.1 4.5 4.3   CHLORIDE 104  --  103 106 108* 110*   CO2 28.0  --  26.0 26.0 22.0 23.0   ANION GAP 11.0  --  17.0* 13.0 15.0 12.0   BUN 51*  --  47* 46* 43* 42*   CREATININE 1.94*  --  1.84* 1.80* 1.82* 1.86*   EGFR 35.0*  --  37.3* 38.3* 37.8* 36.8*   GLUCOSE 63*  --  206* 185* 236* 115*   CALCIUM 8.4*  --  8.8 8.9 8.7 8.5*   IONIZED CALCIUM  --  1.22  --   --   --   --    MAGNESIUM  --   --  2.2 2.3 2.5*  --    PHOSPHORUS  --   --  4.4  --   --   --              Lab 08/13/22  0730 08/12/22  1653 08/12/22  1025 08/11/22  0636 08/11/22  0023 08/10/22  2239 08/10/22  1638   PROBNP  --   --   --  30,799.0*  --   --  21,299.0*   TROPONIN T 0.230* 0.206* 0.157* 0.146*  --  0.129* 0.108*   PROTIME  --   --   --   --  13.6  --   --    INR  --   --   --   --  1.05  --   --                  Lab 08/11/22  0041 08/10/22  1741 08/10/22  1633   PH, ARTERIAL 7.422 7.382 7.262*   PCO2, ARTERIAL 38.6 40.3 55.7*   PO2 ART 67.1* 106.0 50.7*   FIO2 32 60 80   HCO3 ART 25.2 23.9 25.1   BASE EXCESS ART 0.8 -1.1* -2.6*   CARBOXYHEMOGLOBIN 0.9 1.0 1.4     Brief Urine Lab Results  (Last result in the past 365 days)      Color   Clarity   Blood   Leuk Est   Nitrite   Protein   CREAT   Urine HCG        02/27/22 1321 Dark Yellow   Clear   Negative   Negative   Negative   2+                 Microbiology Results Abnormal     Procedure Component Value - Date/Time    Anaerobic Culture - Tissue, Toe, Left [[id number]] Collected: 08/02/22 1027    Lab Status: Final result Specimen: Tissue from Toe, Left Updated: 08/07/22 0858     Anaerobic Culture No anaerobes isolated at 5 days    Blood Culture - Blood, Hand, Left [[id number]]  (Normal) Collected: 07/27/22 2040    Lab Status: Final result Specimen: Blood from Hand, Left Updated: 08/01/22 2132      Blood Culture No growth at 5 days    Blood Culture - Blood, Hand, Right [[id number]]  (Normal) Collected: 07/27/22 2030    Lab Status: Final result Specimen: Blood from Hand, Right Updated: 08/01/22 2132     Blood Culture No growth at 5 days    COVID PRE-OP / PRE-PROCEDURE SCREENING ORDER (NO ISOLATION) - Swab, Nasopharynx [[id number]]  (Normal) Collected: 07/27/22 2246    Lab Status: Final result Specimen: Swab from Nasopharynx Updated: 07/27/22 2357    Narrative:      The following orders were created for panel order COVID PRE-OP / PRE-PROCEDURE SCREENING ORDER (NO ISOLATION) - Swab, Nasopharynx.  Procedure                               Abnormality         Status                     ---------                               -----------         ------                     COVID-19 and FLU A/B PCR...[[id number]]  Normal              Final result                 Please view results for these tests on the individual orders.    COVID-19 and FLU A/B PCR - Swab, Nasopharynx [[id number]]  (Normal) Collected: 07/27/22 2246    Lab Status: Final result Specimen: Swab from Nasopharynx Updated: 07/27/22 2357     COVID19 Not Detected     Influenza A PCR Not Detected     Influenza B PCR Not Detected    Narrative:      Fact sheet for providers: https://www.fda.gov/media/817833/download    Fact sheet for patients: https://www.fda.gov/media/799670/download    Test performed by PCR.          Adult Transthoracic Echo Complete W/ Cont if Necessary Per Protocol    Result Date: 8/12/2022  · Left ventricular ejection fraction appears to be 46 - 50%. Left ventricular systolic function is low normal. · Left ventricular septal hypokinesis · No significant valvular heart disease      CT Chest Without Contrast Diagnostic    Result Date: 8/12/2022  DATE OF EXAM: 8/12/2022 3:25 PM  PROCEDURE: CT CHEST WO CONTRAST DIAGNOSTIC-  INDICATIONS: respiratory failure, high oxygen requirement; S43.005A-Unspecified dislocation of left shoulder joint, initial  encounter; L03.116-Cellulitis of left lower limb; Z78.9-Other specified health status  COMPARISON: No comparisons available.  TECHNIQUE: Routine transaxial slices were obtained through the chest without the administration of intravenous contrast. Reconstructed coronal and sagittal images were also obtained. Automated exposure control and iterative construction methods were used.  The radiation dose reduction device was turned on for each scan per the ALARA (As Low as Reasonably Achievable) protocol.  FINDINGS: There is no pathologic axillary adenopathy or other worrisome body wall soft tissue finding in the chest. No acute findings are present in the partially characterized upper abdomen. There are small bilateral pleural effusions. There is no pericardial effusion. There is no distinct pathologic mediastinal adenopathy. Mildly atherosclerotic, nonaneurysmal thoracic aorta. Evaluation of the osseous structures demonstrates no evidence of acute fracture or or aggressive osseous lesion, with multilevel thoracic spondylosis change present. Evaluation of the lung fields demonstrates interlobular septal thickening, with some intervening groundglass opacity, suggesting prominent component of pulmonary edema. There is some involved fairly symmetric consolidation present in the lower lobes, without additional specific evidence of superimposed pneumonia. Nonspecific grouped pulmonary nodules are present anteriorly at the right lung base, with a discrete nodule present measuring 10 mm.      Impression: Appearance most consistent with pulmonary edema, including small-to-moderate bilateral pleural effusions. Nonspecific pulmonary nodules are present anteriorly near the right lung base, measuring up to 10 mm. Consider nonemergent follow-up CT chest without acute process has resolved.  This report was finalized on 8/12/2022 6:05 PM by Jermaine Wang.      NM Lung Scan Perfusion Particulate    Result Date: 8/12/2022  DATE OF  EXAM: 8/12/2022 3:16 PM  PROCEDURE: NM LUNG SCAN PERFUSION PARTICULATE-  INDICATIONS: acute respiratory failure, r/o PE; S43.005A-Unspecified dislocation of left shoulder joint, initial encounter; L03.116-Cellulitis of left lower limb; Z78.9-Other specified health status  COMPARISON: Portable chest radiograph of same day  TECHNIQUE: 5.5 mCi of technetium 99m labeled MAA was administered intravenously for the perfusion portion of the pulmonary exam. Scintigraphic images of the lungs were obtained in multiple projections to assess perfusion.  FINDINGS: The perfusion pattern appears heterogeneous, which would likely be explained by the patient's pulmonary interstitial edema. No well-defined large or small segmental or subsegmental defects are identified. Study is considered low probability for pulmonary embolus.      Impression: Heterogeneous perfusion scan, which likely reflects the patient's pulmonary edema seen on concurrent chest radiograph. Study is considered low probability for pulmonary embolic disease.  This report was finalized on 8/12/2022 10:50 PM by Dr. Leodan Nelson MD.      XR Chest 1 View    Result Date: 8/12/2022  DATE OF EXAM: 8/12/2022 8:35 AM  PROCEDURE: XR CHEST 1 VW-  INDICATIONS: chest pain; S43.005A-Unspecified dislocation of left shoulder joint, initial encounter; L03.116-Cellulitis of left lower limb; Z78.9-Other specified health status  COMPARISON: 8/10/2022  TECHNIQUE: Single radiographic AP view of the chest was obtained.  FINDINGS: Cardiac size is enlarged postoperative changes of CABG. A left-sided transvenous pacemaker is in good position. The right internal jugular line has been removed. A right-sided PICC line terminates in the SVC. Left-sided transvenous pacemaker remains in appropriate position. There is mild passive congestion present. There is improving aeration in the lung bases compared with the last study.      Impression: Cardiomegaly with evidence of at least mild passive  congestion likely early interstitial edema. Findings are suggestive of at least mild heart failure. There is a decrease in atelectasis in the lung bases compared with the last study.  This report was finalized on 8/12/2022 9:04 AM by Donny Cordoba MD.      XR Abdomen KUB    Result Date: 8/11/2022  Examination: XR ABDOMEN KUB-  Date of Exam: 8/11/2022 1:50 PM  Indication: following constipation, distended abd; S43.005A-Unspecified dislocation of left shoulder joint, initial encounter; L03.116-Cellulitis of left lower limb; Z78.9-Other specified health status.  Comparison: 8/9/2022.  Technique: Single AP view of the abdomen was obtained radiographically.  Findings: There appears to be a decreased pedal burden with normal colonic gas and stool at this time. There are phleboliths in the pelvis. There is evidence of prior median sternotomy. There are mild degenerative changes of both hips. There is no bowel distention. No pneumatosis intestinalis or evidence of pneumoperitoneum. No pathologic abdominal calcifications. Regional bones appear intact. No evidence of organomegaly or large volume ascites.      Impression: Normal colonic stool burden. No bowel distention.  This report was finalized on 8/11/2022 3:18 PM by Navneet Amin MD.        Results for orders placed during the hospital encounter of 07/27/22    Adult Transthoracic Echo Complete W/ Cont if Necessary Per Protocol    Interpretation Summary  · Left ventricular ejection fraction appears to be 46 - 50%. Left ventricular systolic function is low normal.  · Left ventricular septal hypokinesis  · No significant valvular heart disease      I have reviewed the medications:  Scheduled Meds:amLODIPine, 10 mg, Oral, Daily  ampicillin-sulbactam, 3 g, Intravenous, Q8H  aspirin, 81 mg, Oral, Daily  cetirizine, 5 mg, Oral, Daily  heparin (porcine), 5,000 Units, Subcutaneous, Q8H  insulin detemir, 30 Units, Subcutaneous, Nightly  insulin lispro, 0-7 Units, Subcutaneous, TID  AC  Insulin Lispro, 5 Units, Subcutaneous, TID With Meals  levothyroxine, 50 mcg, Oral, Q AM  metoprolol succinate XL, 50 mg, Oral, Q24H  polyethylene glycol, 17 g, Oral, Daily  sodium chloride, 10 mL, Intravenous, Q12H  sodium chloride, 10 mL, Intravenous, Q12H  vitamin D3, 5,000 Units, Oral, Daily      Continuous Infusions:   PRN Meds:.•  acetaminophen **OR** acetaminophen **OR** acetaminophen  •  albuterol  •  bisacodyl  •  dextrose  •  dextrose  •  glucagon (human recombinant)  •  HYDROcodone-acetaminophen  •  nitroglycerin  •  ondansetron **OR** ondansetron  •  phenol  •  prochlorperazine  •  sennosides-docusate  •  [COMPLETED] Insert peripheral IV **AND** sodium chloride  •  sodium chloride  •  sodium chloride    Assessment & Plan   Assessment & Plan     Active Hospital Problems    Diagnosis  POA   • Proximal phalanx fracture of the second digit extending into the second metatarsal joint [S92.919A]  Unknown   • Dislocation of left shoulder joint, initial encounter [S43.005A]  Yes   • Cellulitis in diabetic foot (HCC) [E11.628, L03.119]  Yes   • Fall [W19.XXXA]  Yes   • Sepsis (HCC) [A41.9]  Yes   • Leukocytosis [D72.829]  Yes   • Lactic acidosis [E87.2]  Yes   • Elevated C-reactive protein (CRP) [R79.82]  Yes   • Elevated sed rate (elev SR) [R70.0]  Yes   • SSS (sick sinus syndrome) ([facility]) [I49.5]  Yes   • Hyperlipidemia [E78.5]  Yes   • Hypertension [I10]  Yes   • S/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 Au [Z95.1]  Not Applicable   • Hypothyroidism (acquired) [E03.9]  Yes   • Type 2 diabetes mellitus (HCC) [E11.9]  Yes      Resolved Hospital Problems   No resolved problems to display.        Brief Hospital Course to date:  Jermaine Singh is a 77 y.o. male with PMH significant for HTN, HLD, CAD s/p CABG, PVD s/p RLE stent, CKD III, insulin-dependent DMII and hypothyroidism. He was admitted to [facility] 7/27/22 for L shoulder dislocation after a fall as well as sepsis secondary to L great toe  osteomyelitis.     L great toe cellulitis / osteomyelitis  Peripheral vascular disease   - Followed by Dr. Joseph of podiatry - was on PO Doxycycline outpatient  - Bone scan was concerning for osteomyelitis of L great toe, now s/p L great toe amputation by Dr. Márquez 8/2/22   - arterial duplex obtained. Cardiology does not recommend revascularization at this time   - tissue culture growing enterococcus faecalis and enterococcus casseliflavus  - ID following - changed to Unasyn, will need 6 weeks from 8/2/22 (9/13/2022)  - S/p PICC    Acute Respiratory Failure  Bilateral LE DVT's  Acute Systolic CHF  --?d/t CHF.  Cards diuresing, better today s/p  bumex 2mg yesterday.  Echo showed EF 46-50%  --weaned off of HFNC back to NC  --COVID negative on 7/27/2022, repeat swab  --would like to r/o PE but can't do CTA d/t elevated creatinine. VQ scan was low probability for PE but LE duplex prelim showed thrombus in right posterior tibial veins, peroneal veins, and small saphenous vein.  Left leg showed with thrombus left peroneal veins and small saphenous vein, will start heparin gtt now with plan to probably transition to eliquis tomorrow.        Fall   Anterior L shoulder dislocation  - s/p reduction in the ED  - Appreciate ortho (Dr. Gipson) assistance   - Recommend non-operative treatment. Sling for comfort.   - PT for range of motion exercises, avoid Abduction and external rotation. WBAT on LUE with walker or knee scooter  - Follow up with Dr. Gipson in 2-3 weeks     Elevated Troponin  Acute CHF  --cards following, likely demand ischemia d/t acute CHF per cards. S/p diuresis, creatinine worse today so hold further diuresis today.  --troponin continuing to increase so will continue to trend  --Echo EF 46-50%, LV septal HK, no significant VHD      Constipation  Nausea/Vomiting  - KUB with large stool burden otherwise no acute process present  - IVF, zofran and compazine PRN  - aggressive bowel regimen  -  improved/resolved     Insulin-dependent DMII  - Hgb A1c 9.0%  - hypoglycemic this AM, decrease Levemir from 30 units to 25units QHS.   Hold lispro for now + SSI      [place] - worsening  - Baseline creatinine appears to be 0.9-1.2  - IVF was restarted a few days ago but ?flash pulmonary edema over night so IVF stopped and now diuresising.  Worse today after diuresis so hold further diuretics for now.     Hypertension  Coronary artery disease s/p CABG  SSS s/p PPM   - Cardiology has stopped ACEI and added Amlodipine 10mg daily   - Continue Metoprolol XL 50mg daily   - scheduled for 12/28/2022 1:30 pm with device check and should keep that appt.     Hypothyroid  - Continue levothyroxine    Expected Discharge Location and Transportation: [facility]   Expected Discharge Date: 8/16/22    DVT prophylaxis:  Medical and mechanical DVT prophylaxis orders are present.     AM-PAC 6 Clicks Score (PT): 12 (08/12/22 1417)     Updated wife at bedside on 8/11/2022    CODE STATUS:   Code Status and Medical Interventions:   Ordered at: 07/28/22 0000     Code Status (Patient has no pulse and is not breathing):    CPR (Attempt to Resuscitate)     Medical Interventions (Patient has pulse or is breathing):    Full Support       Reilly Thomas MD  08/13/22

## 2022-08-13 NOTE — PROGRESS NOTES
"  [facility]  PROGRESS NOTE    Date of Admission: 7/27/2022  Date of Service: 08/13/22    Primary Care Physician: Farooq Painter MD    Chief Complaint: f/u HTN, CAD, PAD   Problem List:   Type 2 diabetes mellitus (HCC)    Hypertension    Hyperlipidemia    Hypothyroidism (acquired)    S/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 Au    SSS (sick sinus syndrome) (HCC)    Dislocation of left shoulder joint, initial encounter    Cellulitis in diabetic foot (HCC)    Fall    Sepsis (HCC)    Leukocytosis    Lactic acidosis    Elevated C-reactive protein (CRP)    Elevated sed rate (elev SR)    Proximal phalanx fracture of the second digit extending into the second metatarsal joint      Subjective      Patient sitting in chair, reports left shoulder is hurting, no recurrent right sided chest pain. He has not had a BM, and has urinary retention. He is off high flow O2 on 5L NC currently, breathing is better     Objective   Vitals: /89 (BP Location: Right arm, Patient Position: Lying)   Pulse 73   Temp 98.2 °F (36.8 °C) (Oral)   Resp 16   Ht 190.5 cm (75\")   Wt 98 kg (216 lb)   SpO2 94%   BMI 27.00 kg/m²     Physical Exam:  GENERAL: Alert, cooperative, in no acute distress.   HEENT: Normocephalic, no jugular venous distention  HEART: Regular rhythm, normal rate, and no murmurs, gallops, or rubs.   LUNGS: No wheezing,or rhonchi. Few crackles left base, on 5L NC  NEUROLOGIC: No focal abnormalities involving strength or sensation are noted.   EXTREMITIES: No clubbing, cyanosis, or edema noted.     Results:  Results from last 7 days   Lab Units 08/12/22  0941 08/11/22  0636 08/11/22  0023   WBC 10*3/mm3 13.76* 11.75* 15.41*   HEMOGLOBIN g/dL 11.2* 11.0* 11.8*   HEMATOCRIT % 35.5* 33.7* 35.7*   PLATELETS 10*3/mm3 236 250 252     Results from last 7 days   Lab Units 08/13/22  0730 08/12/22  0941 08/11/22  0636   SODIUM mmol/L 143 146* 145   POTASSIUM mmol/L 3.8 4.2 4.1   CHLORIDE mmol/L " 104 103 106   CO2 mmol/L 28.0 26.0 26.0   BUN mg/dL 51* 47* 46*   CREATININE mg/dL 1.94* 1.84* 1.80*   GLUCOSE mg/dL 63* 206* 185*      Lab Results   Component Value Date    CHOL 180 08/04/2022    TRIG 160 (H) 08/04/2022    HDL 39 (L) 08/04/2022     (H) 08/04/2022    AST 15 07/27/2022    ALT 19 07/27/2022         Results from last 7 days   Lab Units 08/11/22  0023   PROTIME Seconds 13.6   INR  1.05   APTT seconds 33.1*     Results from last 7 days   Lab Units 08/13/22  0730 08/12/22  1653 08/12/22  1025   TROPONIN T ng/mL 0.230* 0.206* 0.157*     Results from last 7 days   Lab Units 08/11/22  0636   PROBNP pg/mL 30,799.0*       Intake/Output Summary (Last 24 hours) at 8/13/2022 0900  Last data filed at 8/13/2022 0600  Gross per 24 hour   Intake 1020 ml   Output 1125 ml   Net -105 ml     I personally reviewed the patient's EKG/Telemetry data    Radiology Data:   CXR 8/12/22:  IMPRESSION:  Cardiomegaly with evidence of at least mild passive congestion likely  early interstitial edema. Findings are suggestive of at least mild heart  failure. There is a decrease in atelectasis in the lung bases compared  with the last study.     VQ scan 8/12/22:  IMPRESSION:  Heterogeneous perfusion scan, which likely reflects the patient's  pulmonary edema seen on concurrent chest radiograph. Study is considered  low probability for pulmonary embolic disease.    CT Chest WO contrast 8/12/22:  IMPRESSION:  Appearance most consistent with pulmonary edema, including  small-to-moderate bilateral pleural effusions. Nonspecific pulmonary  nodules are present anteriorly near the right lung base, measuring up to  10 mm. Consider nonemergent follow-up CT chest without acute process has  Resolved.    Current Medications:  amLODIPine, 10 mg, Oral, Daily  ampicillin-sulbactam, 3 g, Intravenous, Q8H  aspirin, 81 mg, Oral, Daily  cetirizine, 5 mg, Oral, Daily  heparin (porcine), 5,000 Units, Subcutaneous, Q8H  insulin detemir, 30 Units,  Subcutaneous, Nightly  insulin lispro, 0-7 Units, Subcutaneous, TID AC  Insulin Lispro, 5 Units, Subcutaneous, TID With Meals  levothyroxine, 50 mcg, Oral, Q AM  metoprolol succinate XL, 50 mg, Oral, Q24H  polyethylene glycol, 17 g, Oral, Daily  sodium chloride, 10 mL, Intravenous, Q12H  sodium chloride, 10 mL, Intravenous, Q12H  vitamin D3, 5,000 Units, Oral, Daily           Assessment and Plan:   1.  Peripheral Vascular Disease              -  Remote Bilateral iliac stenting with significant infrapopliteal disease, good collateral flow.              - He has excellent pulses bilaterally by doppler              -  POD 11 p Left great toe transmetatarsal amputation.              -  Continue PT/OT     2.  CAD              -  Remote CABG              -  Continue ASA, CCB              -  Slight elevation in troponins - demand ischemia with acute CHF.  EKG shows no acute findings, V Paced.               -  Repeat echo shows EF 46-50%, LV septal HK, no significant valvular disease.     3.  Osteomyelitis   -  XRay suggesting osteo, s/p left great toe amputation               -  IV Antibiotics per ID.  Dr. Márquez will continue to follow.       4.  DMII              -  Per primary service.     5.  Essential Hypertension              -  Better controlled              -  Continue home dose Toprol XL 50mg daily.  Trialed low dose Lisinopril with rise in Cr.   Discontinued ACEI.   Increased Norvasc to 10mg daily and continue to monitor.               -   Cr 1.9 today from 1.8 yesterday      6.  Hyperlipidemia              -  Lipid panel reviewed.  , , HDL 39, .              -  Will address statin.     7.  Left shoulder dislocation              -  S/P fall prompting ED visit/admission.              -  PT/OT         8.  [place]              -  Cr 1.9              -  ACEI discontinued.              -  Continue with daily labs, avoid nephrotoxic agents      9.  Hypoxia              -  Pt was on high flow O2 yesterday,  now down to 5L               -  CXR with mild pulmonary edema - received dose of Bumex 2mg IV yesterday   - would hold further diuresis today due to rise in BUN/Cr-- unless he decompensates    - VQ scan low probability for PE, LE venous duplex pending         78 yo CM with known CAD/CABG, PVDz with prior stenting.  Presents after fall resulting in left shoulder dislocation as well as presenting with left toe wound.  Inflammatory markers and WBC elevated, Xray suggests osteomyelitis.  He has known PVDz with severe infrapopliteal disease.  However, he has bilateral collateral flow and good pulses by doppler bilaterally.  At this time, there is no need for revascularization of the LLE and amputation recommended.  Post Op course is stable.       Cardiology initially signed off and asked to re evaluate for nausea/emesis with elevated troponins, likely demand ischemia with acute CHF. Received IV diuresis yesterday with improvement.    Echo reviewed and WNL.       Electronically signed by Lubna Guidry PA-C, 08/13/22, 9:04 AM EDT.

## 2022-08-13 NOTE — THERAPY TREATMENT NOTE
Patient Name: Jermaine Singh  : 1945    MRN: [id number]                              Today's Date: 2022       Admit Date: 2022    Visit Dx:     ICD-10-CM ICD-9-CM   1. Dislocation of left shoulder joint, initial encounter  S43.005A 831.00   2. Cellulitis of left foot  L03.116 682.7   3. 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  Preserved LV function   • Type 2 diabetes mellitus ([facility])   • Hypertension   • Hyperlipidemia   • Hypothyroidism (acquired)   • Vitamin D deficiency on Rx    • Serum Cr 1.32 on admission.  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 Au   • Dizziness   • Atherosclerosis of native artery of both lower extremities with intermittent claudication ([facility])   • SSS (sick sinus syndrome) ([facility])   • Dislocation of left shoulder joint, initial encounter   • Cellulitis in diabetic foot ([facility])   • Fall   • Sepsis ([facility])   • Leukocytosis   • Lactic acidosis   • Elevated C-reactive protein (CRP)   • Elevated sed rate (elev SR)   • Proximal phalanx fracture of the second digit extending into the second metatarsal joint     Past Medical History:   Diagnosis Date   • Asthma    • Coronary artery disease    • Diabetes mellitus ([facility])     started on [place] 2018; started on po meds in ; checking blood sugars daily    • Disease of thyroid gland     po meds daily for hypothyroidism    • History of fracture as a child     rt leg- severe    • Hyperlipidemia    • Hypertension    • Hypothyroidism    • Peripheral neuropathy    • Peripheral vascular disease ([facility])     s/p angiogram -needs stent in left leg    • RBBB    • Right knee pain    • Vitamin D deficiency      Past Surgical History:   Procedure Laterality Date   • APPENDECTOMY     • CARDIAC CATHETERIZATION N/A 2019    Procedure: Left Heart Cath;  Surgeon: Paulie  Cooper TRACEY MD;  Location:  [facility] CATH INVASIVE LOCATION;  Service: Cardiology   • CARDIAC ELECTROPHYSIOLOGY PROCEDURE N/A 5/18/2022    Procedure: DEVICE IMPLANT;  Surgeon: Cooper Apodaca MD;  Location:  "[facility] CATH INVASIVE LOCATION;  Service: Cardiology;  Laterality: N/A;   • COLONOSCOPY     • CORONARY ARTERY BYPASS GRAFT N/A 3/22/2019    Procedure: MEDIAN STERNOTOMY, CORONARY ARTERY BYPASS GRAFT X3, UTILIZING THE LEFT INTERNAL MAMMARY ARTERY, EVH AND OPEN HARVEST OF THE RIGHT GREATER SAPHENOUS VEIN, EXPLORATION OF THE LEFT LEG;  Surgeon: Ap Au MD;  Location:  [facility] OR;  Service: Cardiothoracic   • EYE SURGERY Bilateral     cataracts    • INTERVENTIONAL RADIOLOGY PROCEDURE N/A 7/29/2021    Procedure: Abdominal Aortagram with Runoff;  Surgeon: Jermaine Hernandez MD;  Location:  [facility] CATH INVASIVE LOCATION;  Service: Cardiovascular;  Laterality: N/A;   • KNEE ARTHROSCOPY      right x 2, left x 1   • LACERATION REPAIR      right leg   • LEG SURGERY      2 for fracture of rt leg    • TONSILLECTOMY      Adnoidectomy   • TRANS METATARSAL AMPUTATION Left 8/2/2022    Procedure: GREAT TOE AMPUTATION LEFT;  Surgeon: Gopal Márquez MD;  Location:  [facility] OR;  Service: Vascular;  Laterality: Left;      General Information     Row Name 08/13/22 1654          Physical Therapy Time and Intention    Document Type progress note/recertification  -SS     Mode of Treatment physical therapy  -SS     Row Name 08/13/22 1654          General Information    Patient Profile Reviewed yes  -SS     Existing Precautions/Restrictions fall;left;non-weight bearing;other (see comments);oxygen therapy device and L/min  NWB LLE; LUE WBAT - No ER/ABD, BLE DVT - on heparin drip and MD cleared for mobility, offloading shoe  -SS     Barriers to Rehab medically complex;previous functional deficit;ineffective coping  -SS     Row Name 08/13/22 2154          Cognition    Orientation Status (Cognition) oriented x 3  -SS     Row Name  08/13/22 1654          Safety Issues, Functional Mobility    Safety Issues Affecting Function (Mobility) ability to follow commands;at risk behavior observed;insight into deficits/self-awareness;judgment;positioning of assistive device;problem-solving;safety precaution awareness;safety precautions follow-through/compliance;sequencing abilities;unable to maintain weight-bearing restrictions;other (see comments)  pt. educated regarding NWB status, several cues for maintaining; verbalizes he understands NWB status & why but continues to place LLE on ground  -     Impairments Affecting Function (Mobility) balance;endurance/activity tolerance;pain;strength;range of motion (ROM);postural/trunk control  -           User Key  (r) = Recorded By, (t) = Taken By, (c) = Cosigned By    Initials Name Provider Type     Jenna Stanley, PT Physical Therapist               Mobility     Row Name 08/13/22 1658          Bed Mobility    Comment, (Bed Mobility) up in chair  -     Row Name 08/13/22 1658          Sit-Stand Transfer    Sit-Stand [place] (Transfers) moderate assist (50% patient effort);2 person assist;verbal cues;nonverbal cues (demo/gesture)  -     Assistive Device (Sit-Stand Transfers) walker, front-wheeled  -     Comment, (Sit-Stand Transfer) VC for hand placement, stepping out LLE, lowering with eccentric control  -     Row Name 08/13/22 1658          Gait/Stairs (Locomotion)    Comment, (Gait/Stairs) deferred secondary to inability to maintain NWB status; pt. reports he would not like to progress to hopping gait pattern due to instability  -     Row Name 08/13/22 1658          Mobility    Extremity Weight-bearing Status left upper extremity;left lower extremity  -SS     Left Upper Extremity (Weight-bearing Status) weight-bearing as tolerated (WBAT)  -     Left Lower Extremity (Weight-bearing Status) non weight-bearing (NWB)   -           User Key  (r) = Recorded By, (t) = Taken By, (c) = Cosigned  By    Initials Name Provider Type     Jenna Stanley PT Physical Therapist               Obj/Interventions     Row Name 08/13/22 1659          Motor Skills    Therapeutic Exercise hip;ankle;knee;other (see comments)  family given handout w/ther-ex for carryover on own  -     Row Name 08/13/22 1659          Hip (Therapeutic Exercise)    Hip (Therapeutic Exercise) isometric exercises;strengthening exercise  -     Hip Isometrics (Therapeutic Exercise) bilateral;gluteal sets;10 repetitions  -     Hip Strengthening (Therapeutic Exercise) bilateral;aBduction;aDduction;external rotation;internal rotation;heel slides;marching while seated;10 repetitions  -     Row Name 08/13/22 1659          Knee (Therapeutic Exercise)    Knee (Therapeutic Exercise) isometric exercises;strengthening exercise  -     Knee Isometrics (Therapeutic Exercise) bilateral;quad sets;10 repetitions  -     Knee Strengthening (Therapeutic Exercise) bilateral;SLR (straight leg raise);LAQ (long arc quad);10 repetitions  -     Row Name 08/13/22 1659          Ankle (Therapeutic Exercise)    Ankle (Therapeutic Exercise) AROM (active range of motion)  -     Ankle AROM (Therapeutic Exercise) bilateral;dorsiflexion;plantarflexion;10 repetitions  -     Row Name 08/13/22 1659          Balance    Balance Assessment sitting static balance;sitting dynamic balance;sit to stand dynamic balance;standing static balance;standing dynamic balance  -     Static Sitting Balance standby assist  -     Dynamic Sitting Balance standby assist  -     Position, Sitting Balance unsupported;sitting in chair  -     Sit to Stand Dynamic Balance moderate assist;2-person assist  -     Static Standing Balance minimal assist;2-person assist  -     Dynamic Standing Balance moderate assist;2-person assist  -     Position/Device Used, Standing Balance supported;walker, front-wheeled  -     Balance Interventions sitting;standing;sit to  stand;supported;dynamic;static  -SS           User Key  (r) = Recorded By, (t) = Taken By, (c) = Cosigned By    Initials Name Provider Type    SS Jenna Stanley, PT Physical Therapist               Goals/Plan     Row Name 08/13/22 1701          Bed Mobility Goal 1 (PT)    Activity/Assistive Device (Bed Mobility Goal 1, PT) sit to supine/supine to sit  -SS     [place] Level/Cues Needed (Bed Mobility Goal 1, PT) independent  -SS     Time Frame (Bed Mobility Goal 1, PT) long term goal (LTG);10 days  -SS     Progress/Outcomes (Bed Mobility Goal 1, PT) continuing progress toward goal;goal ongoing  -     Row Name 08/13/22 1701          Transfer Goal 1 (PT)    Activity/Assistive Device (Transfer Goal 1, PT) sit-to-stand/stand-to-sit;bed-to-chair/chair-to-bed;walker, rolling  -SS     [place] Level/Cues Needed (Transfer Goal 1, PT) contact guard required  -SS     Time Frame (Transfer Goal 1, PT) long term goal (LTG);10 days  -SS     Progress/Outcome (Transfer Goal 1, PT) progress slower than expected;goal ongoing  -     Row Name 08/13/22 1701          Gait Training Goal 1 (PT)    Activity/Assistive Device (Gait Training Goal 1, PT) gait (walking locomotion);assistive device use;walker, rolling  -SS     [place] Level (Gait Training Goal 1, PT) minimum assist (75% or more patient effort)  -SS     Distance (Gait Training Goal 1, PT) 50  -SS     Time Frame (Gait Training Goal 1, PT) long term goal (LTG);10 days  -SS     Progress/Outcome (Gait Training Goal 1, PT) progress slower than expected;goal revised this date;goal ongoing  -     Row Name 08/13/22 1701          Stairs Goal 1 (PT)    Activity/Assistive Device (Stairs Goal 1, PT) stairs, all skills  -SS     [place] Level/Cues Needed (Stairs Goal 1, PT) standby assist  -SS     Number of Stairs (Stairs Goal 1, PT) 4  -SS     Time Frame (Stairs Goal 1, PT) long term goal (LTG);10 days  -SS     Progress/Outcome (Stairs Goal 1, PT) goal no longer  appropriate  -           User Key  (r) = Recorded By, (t) = Taken By, (c) = Cosigned By    Initials Name Provider Type    Jenna Ramirez PT Physical Therapist               Clinical Impression    No documentation.                Outcome Measures     Row Name 08/13/22 1702 08/13/22 0910       How much help from another person do you currently need...    Turning from your back to your side while in flat bed without using bedrails? 3  -SS 4  -MW    Moving from lying on back to sitting on the side of a flat bed without bedrails? 3  -SS 3  -MW    Moving to and from a bed to a chair (including a wheelchair)? 2  -SS 3  -MW    Standing up from a chair using your arms (e.g., wheelchair, bedside chair)? 2  -SS 3  -MW    Climbing 3-5 steps with a railing? 2  -SS 2  -MW    To walk in hospital room? 2  -SS 2  -MW    AM-PAC 6 Clicks Score (PT) 14  -SS 17  -MW    Highest level of mobility 4 --> Transferred to chair/commode  -SS 5 --> Static standing  -MW    Row Name 08/13/22 1702          Functional Assessment    Outcome Measure Options AM-PAC 6 Clicks Basic Mobility (PT)  -           User Key  (r) = Recorded By, (t) = Taken By, (c) = Cosigned By    Initials Name Provider Type    Cherie Steele, RN Registered Nurse    Jenna Ramirez PT Physical Therapist                             Physical Therapy Education                 Title: PT OT SLP Therapies (Done)     Topic: Physical Therapy (Done)     Point: Mobility training (Done)     Learning Progress Summary           Patient Dreaer, E,H, VU,DU,NR by  at 8/13/2022 1703    Comment: Reviewed safety/technique with transfers, NWB status, HEP, PT POC   Family Dreaer, E,H, VU,DU,NR by  at 8/13/2022 1703    Comment: Reviewed safety/technique with transfers, NWB status, HEP, PT POC      Show all documentation for this point (7)                 Point: Home exercise program (Done)     Learning Progress Summary           Patient Dreaer, E,H, VU,DU,NR by  at 8/13/2022 1703     Comment: Reviewed safety/technique with transfers, NWB status, HEP, PT POC   Family Eager, E,H, VU,DU,NR by  at 8/13/2022 1703    Comment: Reviewed safety/technique with transfers, NWB status, HEP, PT POC      Show all documentation for this point (7)                 Point: Body mechanics (Done)     Learning Progress Summary           Patient Eager, E,H, VU,DU,NR by SS at 8/13/2022 1703    Comment: Reviewed safety/technique with transfers, NWB status, HEP, PT POC   Family Eager, E,H, VU,DU,NR by SS at 8/13/2022 1703    Comment: Reviewed safety/technique with transfers, NWB status, HEP, PT POC      Show all documentation for this point (7)                 Point: Precautions (Done)     Learning Progress Summary           Patient Eager, E,H, VU,DU,NR by SS at 8/13/2022 1703    Comment: Reviewed safety/technique with transfers, NWB status, HEP, PT POC   Family Eager, E,H, VU,DU,NR by  at 8/13/2022 1703    Comment: Reviewed safety/technique with transfers, NWB status, HEP, PT POC      Show all documentation for this point (7)                             User Key     Initials Effective Dates Name Provider Type Discipline     06/01/21 -  Jenna Stanley, SETH Physical Therapist PT              PT Recommendation and Plan     Plan of Care Reviewed With: patient, family  Progress: improving  Outcome Evaluation: Pt. performed bed mobility with min assist for sling placement and transferred bed to chair w/mechnical lift device, dependent of 2. He performed sit to stand w/mod assist of 2. He was able to maintain standing for ~30 seconds. Pt. fatigues very easily with functional mobility. He tolerated progression in ther-ex well requiring min assist for technique. Will continue to progress as able. Recommend inpatient rehab upon discharge.     Time Calculation:    PT Charges     Row Name 08/13/22 1704             Time Calculation    Start Time 1403  -SS      Stop Time 1432  -SS      Time Calculation (min) 29 min  -SS      PT  Received On 08/13/22  -SS              Time Calculation- PT    Total Timed Code Minutes- PT 29 minute(s)  -SS              Timed Charges    88497 - PT Therapeutic Exercise Minutes 15  -SS      36475 - PT Therapeutic Activity Minutes 14  -SS              Total Minutes    Timed Charges Total Minutes 29  -SS       Total Minutes 29  -SS            User Key  (r) = Recorded By, (t) = Taken By, (c) = Cosigned By    Initials Name Provider Type     Jenna Stanley, PT Physical Therapist              Therapy Charges for Today     Code Description Service Date Service Provider Modifiers Qty    88476346648 HC PT THER PROC EA 15 MIN 8/12/2022 Jenna Stanley, PT GP 1    60078997492 HC PT THER PROC EA 15 MIN 8/13/2022 Jenna Stanley, PT GP 1    95185636731 HC PT THERAPEUTIC ACT EA 15 MIN 8/13/2022 Jenna Stanley, PT GP 1    74734374455 HC PT THER SUPP EA 15 MIN 8/13/2022 Jenna Stanley, PT GP 2          PT G-Codes  Outcome Measure Options: AM-PAC 6 Clicks Basic Mobility (PT)  AM-PAC 6 Clicks Score (PT): 14  AM-PAC 6 Clicks Score (OT): 15    Jenna Stanley PT  8/13/2022

## 2022-08-14 ENCOUNTER — APPOINTMENT (OUTPATIENT)
Dept: GENERAL RADIOLOGY | Facility: HOSPITAL | Age: 77
End: 2022-08-14

## 2022-08-14 LAB
EOSINOPHIL SPEC QL MICRO: 0 % EOS/100 CELLS (ref 0–0)
GLUCOSE BLDC GLUCOMTR-MCNC: 147 MG/DL (ref 70–130)
GLUCOSE BLDC GLUCOMTR-MCNC: 151 MG/DL (ref 70–130)
GLUCOSE BLDC GLUCOMTR-MCNC: 225 MG/DL (ref 70–130)
GLUCOSE BLDC GLUCOMTR-MCNC: 243 MG/DL (ref 70–130)
GLUCOSE BLDC GLUCOMTR-MCNC: 251 MG/DL (ref 70–130)
PROT ?TM UR-MCNC: 57.3 MG/DL
QT INTERVAL: 492 MS
QTC INTERVAL: 534 MS
UFH PPP CHRO-ACNC: 0.1 IU/ML (ref 0.3–0.7)
UFH PPP CHRO-ACNC: 0.49 IU/ML (ref 0.3–0.7)

## 2022-08-14 PROCEDURE — 71045 X-RAY EXAM CHEST 1 VIEW: CPT

## 2022-08-14 PROCEDURE — 85520 HEPARIN ASSAY: CPT

## 2022-08-14 PROCEDURE — 82570 ASSAY OF URINE CREATININE: CPT | Performed by: INTERNAL MEDICINE

## 2022-08-14 PROCEDURE — 99024 POSTOP FOLLOW-UP VISIT: CPT | Performed by: THORACIC SURGERY (CARDIOTHORACIC VASCULAR SURGERY)

## 2022-08-14 PROCEDURE — 99232 SBSQ HOSP IP/OBS MODERATE 35: CPT | Performed by: PHYSICIAN ASSISTANT

## 2022-08-14 PROCEDURE — 84156 ASSAY OF PROTEIN URINE: CPT | Performed by: INTERNAL MEDICINE

## 2022-08-14 PROCEDURE — 25010000002 HEPARIN (PORCINE) 25000-0.45 UT/250ML-% SOLUTION: Performed by: INTERNAL MEDICINE

## 2022-08-14 PROCEDURE — 94799 UNLISTED PULMONARY SVC/PX: CPT

## 2022-08-14 PROCEDURE — 82962 GLUCOSE BLOOD TEST: CPT

## 2022-08-14 PROCEDURE — 25010000002 AMPICILLIN-SULBACTAM PER 1.5 G: Performed by: INTERNAL MEDICINE

## 2022-08-14 PROCEDURE — 94664 DEMO&/EVAL PT USE INHALER: CPT

## 2022-08-14 PROCEDURE — 94761 N-INVAS EAR/PLS OXIMETRY MLT: CPT

## 2022-08-14 PROCEDURE — 97530 THERAPEUTIC ACTIVITIES: CPT | Performed by: GENERAL ACUTE CARE HOSPITAL

## 2022-08-14 PROCEDURE — 99232 SBSQ HOSP IP/OBS MODERATE 35: CPT | Performed by: INTERNAL MEDICINE

## 2022-08-14 PROCEDURE — 63710000001 INSULIN LISPRO (HUMAN) PER 5 UNITS: Performed by: PHYSICIAN ASSISTANT

## 2022-08-14 PROCEDURE — 87205 SMEAR GRAM STAIN: CPT | Performed by: INTERNAL MEDICINE

## 2022-08-14 PROCEDURE — 63710000001 INSULIN DETEMIR PER 5 UNITS: Performed by: INTERNAL MEDICINE

## 2022-08-14 PROCEDURE — 25010000002 METHYLPREDNISOLONE PER 125 MG: Performed by: INTERNAL MEDICINE

## 2022-08-14 PROCEDURE — 25010000002 AMPICILLIN-SULBACTAM PER 1.5 G: Performed by: PHYSICIAN ASSISTANT

## 2022-08-14 RX ORDER — METHYLPREDNISOLONE SODIUM SUCCINATE 125 MG/2ML
60 INJECTION, POWDER, LYOPHILIZED, FOR SOLUTION INTRAMUSCULAR; INTRAVENOUS ONCE
Status: COMPLETED | OUTPATIENT
Start: 2022-08-14 | End: 2022-08-14

## 2022-08-14 RX ORDER — IPRATROPIUM BROMIDE AND ALBUTEROL SULFATE 2.5; .5 MG/3ML; MG/3ML
3 SOLUTION RESPIRATORY (INHALATION) EVERY 4 HOURS PRN
Status: DISCONTINUED | OUTPATIENT
Start: 2022-08-14 | End: 2022-08-26 | Stop reason: HOSPADM

## 2022-08-14 RX ADMIN — Medication 10 ML: at 21:17

## 2022-08-14 RX ADMIN — INSULIN LISPRO 3 UNITS: 100 INJECTION, SOLUTION INTRAVENOUS; SUBCUTANEOUS at 16:52

## 2022-08-14 RX ADMIN — IPRATROPIUM BROMIDE AND ALBUTEROL SULFATE 3 ML: .5; 3 SOLUTION RESPIRATORY (INHALATION) at 20:12

## 2022-08-14 RX ADMIN — LEVOTHYROXINE SODIUM 50 MCG: 50 TABLET ORAL at 05:08

## 2022-08-14 RX ADMIN — SODIUM CHLORIDE 3 G: 900 INJECTION INTRAVENOUS at 00:03

## 2022-08-14 RX ADMIN — SODIUM CHLORIDE 3 G: 900 INJECTION INTRAVENOUS at 16:10

## 2022-08-14 RX ADMIN — INSULIN LISPRO 4 UNITS: 100 INJECTION, SOLUTION INTRAVENOUS; SUBCUTANEOUS at 12:08

## 2022-08-14 RX ADMIN — METHYLPREDNISOLONE SODIUM SUCCINATE 60 MG: 125 INJECTION, POWDER, FOR SOLUTION INTRAMUSCULAR; INTRAVENOUS at 11:32

## 2022-08-14 RX ADMIN — HYDROCODONE BITARTRATE AND ACETAMINOPHEN 1 TABLET: 5; 325 TABLET ORAL at 21:15

## 2022-08-14 RX ADMIN — SODIUM CHLORIDE 3 G: 900 INJECTION INTRAVENOUS at 08:01

## 2022-08-14 RX ADMIN — Medication 10 ML: at 08:03

## 2022-08-14 RX ADMIN — INSULIN DETEMIR 25 UNITS: 100 INJECTION, SOLUTION SUBCUTANEOUS at 21:16

## 2022-08-14 RX ADMIN — CETIRIZINE HYDROCHLORIDE 5 MG: 10 TABLET, FILM COATED ORAL at 08:02

## 2022-08-14 RX ADMIN — IPRATROPIUM BROMIDE AND ALBUTEROL SULFATE 3 ML: .5; 3 SOLUTION RESPIRATORY (INHALATION) at 15:15

## 2022-08-14 RX ADMIN — SENNOSIDES AND DOCUSATE SODIUM 2 TABLET: 50; 8.6 TABLET ORAL at 21:16

## 2022-08-14 RX ADMIN — HYDROCODONE BITARTRATE AND ACETAMINOPHEN 1 TABLET: 5; 325 TABLET ORAL at 06:28

## 2022-08-14 RX ADMIN — Medication 5000 UNITS: at 08:02

## 2022-08-14 RX ADMIN — SENNOSIDES AND DOCUSATE SODIUM 2 TABLET: 50; 8.6 TABLET ORAL at 08:03

## 2022-08-14 RX ADMIN — HYDROCODONE BITARTRATE AND ACETAMINOPHEN 1 TABLET: 5; 325 TABLET ORAL at 16:52

## 2022-08-14 RX ADMIN — ASPIRIN 81 MG CHEWABLE TABLET 81 MG: 81 TABLET CHEWABLE at 08:02

## 2022-08-14 RX ADMIN — POLYETHYLENE GLYCOL 3350 17 G: 17 POWDER, FOR SOLUTION ORAL at 08:02

## 2022-08-14 RX ADMIN — APIXABAN 10 MG: 5 TABLET, FILM COATED ORAL at 12:53

## 2022-08-14 RX ADMIN — METOPROLOL SUCCINATE 50 MG: 50 TABLET, EXTENDED RELEASE ORAL at 08:03

## 2022-08-14 RX ADMIN — HEPARIN SODIUM 15 UNITS/KG/HR: 10000 INJECTION, SOLUTION INTRAVENOUS at 05:09

## 2022-08-14 RX ADMIN — IPRATROPIUM BROMIDE AND ALBUTEROL SULFATE 3 ML: .5; 3 SOLUTION RESPIRATORY (INHALATION) at 11:02

## 2022-08-14 RX ADMIN — AMLODIPINE BESYLATE 10 MG: 10 TABLET ORAL at 08:03

## 2022-08-14 RX ADMIN — HYDROCODONE BITARTRATE AND ACETAMINOPHEN 1 TABLET: 5; 325 TABLET ORAL at 11:33

## 2022-08-14 RX ADMIN — APIXABAN 10 MG: 5 TABLET, FILM COATED ORAL at 21:16

## 2022-08-14 RX ADMIN — IPRATROPIUM BROMIDE AND ALBUTEROL SULFATE 3 ML: .5; 3 SOLUTION RESPIRATORY (INHALATION) at 00:55

## 2022-08-14 NOTE — PLAN OF CARE
Goal Outcome Evaluation:  Plan of Care Reviewed With: patient, daughter        Progress: improving  Outcome Evaluation: Upon arrival, pt notes that he was have increased intensity of nausea, dizziness, fatigue, and weakness. Pt denied out of chair mobility and was agreeable to minimal therex in the chair. LE and UE therex completed. Continue to recommend d/c to [facility] when appropriate.

## 2022-08-14 NOTE — PLAN OF CARE
Goal Outcome Evaluation:         Up in chair all day, up to BSC about 3 times, worked with therapy, enema given this pm resulting in small BM, urinary retention, required I/O cath x1. Weaned down to 3L, denies chest pain, troponin staying leveled today, heparin drip initiated, episode of hypoglycemia this am, insulin adjusted, poor appetite, has attempted to eat a bit more today, compazine for nausea, Vpaced, VSS

## 2022-08-14 NOTE — PROGRESS NOTES
"    [facility] Medicine Services  PROGRESS NOTE    Patient Name: Jermaine Singh  : 1945  MRN: [id number]    Date of Admission: 2022  Primary Care Physician: Farooq Painter MD    Subjective   Subjective     CC:  F/u osteomyelitis    HPI:  CP again last night.  Resolved then returned when he tried to eating pna.  Asks me to \"just try to get me home.\"    ROS:  Gen- No fevers, chills  CV- +chest pain, palpitations  Resp- No cough, +dyspnea  GI- no n/v/abdominal pain    Objective   Objective     Vital Signs:   Temp:  [97.6 °F (36.4 °C)-98.2 °F (36.8 °C)] 97.9 °F (36.6 °C)  Heart Rate:  [68-85] 69  Resp:  [16-22] 22  BP: ()/(46-88) 138/59  Flow (L/min):  [2-6] 4     Physical Exam:  Constitutional: No acute distress, awake and alert, sitting up in bed, looks ill, wife at bedside  HENT: NCAT, mucous membranes moist  Respiratory: Bilateral wheezing throughout, respiratory effort normal, on 5LNC  Cardiovascular: RRR, no murmurs, rubs, or gallops, CP nonreproducible this AM  Gastrointestinal: soft, nontender, distended, +BS  Musculoskeletal: No bilateral ankle edema, LLE in boot  Psychiatric: Appropriate affect, cooperative  Neurologic: Oriented x 3, strength symmetric in all extremities, Cranial Nerves grossly intact to confrontation, speech clear  Skin: No rashes        Results Reviewed:  LAB RESULTS:      Lab 22  0116 22  2310 22  1818 22  1224 22  0941 22  0636 22  0023 08/10/22  1638   WBC  --  14.07*  --  14.42* 13.76* 11.75* 15.41* 15.79*   HEMOGLOBIN  --  10.2*  --  10.5* 11.2* 11.0* 11.8* 11.7*   HEMATOCRIT  --  31.2*  --  31.4* 35.5* 33.7* 35.7* 36.2*   PLATELETS  --  226  --  209 236 250 252 309   NEUTROS ABS  --  11.61*  --  12.08*  --  9.88* 13.43*  --    IMMATURE GRANS (ABS)  --  0.06*  --  0.05  --  0.06* 0.07*  --    LYMPHS ABS  --  1.13  --  1.06  --  0.76 0.60*  --    MONOS ABS  --  1.17*  --  1.17*  --  1.01* 1.26*  --  "   EOS ABS  --  0.04  --  0.01  --  0.00 0.00  --    MCV  --  89.4  --  88.2 93.4 90.6 89.0 90.5   PROCALCITONIN  --   --   --   --   --   --   --  0.17   LACTATE  --   --   --   --   --  1.2  --   --    PROTIME  --   --   --  13.8  --   --  13.6  --    APTT  --   --   --  29.7*  --   --  33.1*  --    HEPARIN ANTI-XA 0.49  --  0.37 0.10*  --  0.10* 0.10*  --          Lab 08/13/22  2310 08/13/22  0730 08/12/22  1018 08/12/22  0941 08/11/22  0636 08/10/22  1638   SODIUM 139 143  --  146* 145 145   POTASSIUM 4.0 3.8  --  4.2 4.1 4.5   CHLORIDE 99 104  --  103 106 108*   CO2 27.0 28.0  --  26.0 26.0 22.0   ANION GAP 13.0 11.0  --  17.0* 13.0 15.0   BUN 51* 51*  --  47* 46* 43*   CREATININE 2.07* 1.94*  --  1.84* 1.80* 1.82*   EGFR 32.4* 35.0*  --  37.3* 38.3* 37.8*   GLUCOSE 151* 63*  --  206* 185* 236*   CALCIUM 8.5* 8.4*  --  8.8 8.9 8.7   IONIZED CALCIUM  --   --  1.22  --   --   --    MAGNESIUM 2.7*  --   --  2.2 2.3 2.5*   PHOSPHORUS  --   --   --  4.4  --   --          Lab 08/13/22 2310   TOTAL PROTEIN 6.1   ALBUMIN 2.90*   GLOBULIN 3.2   ALT (SGPT) 13   AST (SGOT) 12   BILIRUBIN 0.3   ALK PHOS 60         Lab 08/13/22  2310 08/13/22  1224 08/13/22  0730 08/12/22  1653 08/12/22  1025 08/11/22  0636 08/11/22  0023 08/10/22  2239 08/10/22  1638   PROBNP 31,919.0*  --   --   --   --  30,799.0*  --   --  21,299.0*   TROPONIN T 0.230* 0.229* 0.230* 0.206* 0.157* 0.146*  --    < > 0.108*   PROTIME  --  13.8  --   --   --   --  13.6  --   --    INR  --  1.07  --   --   --   --  1.05  --   --     < > = values in this interval not displayed.                 Lab 08/11/22  0041 08/10/22  1741 08/10/22  1633   PH, ARTERIAL 7.422 7.382 7.262*   PCO2, ARTERIAL 38.6 40.3 55.7*   PO2 ART 67.1* 106.0 50.7*   FIO2 32 60 80   HCO3 ART 25.2 23.9 25.1   BASE EXCESS ART 0.8 -1.1* -2.6*   CARBOXYHEMOGLOBIN 0.9 1.0 1.4     Brief Urine Lab Results  (Last result in the past 365 days)      Color   Clarity   Blood   Leuk Est   Nitrite    Protein   CREAT   Urine HCG        02/27/22 1321 Dark Yellow   Clear   Negative   Negative   Negative   2+                 Microbiology Results Abnormal     Procedure Component Value - Date/Time    Anaerobic Culture - Tissue, Toe, Left [[id number]] Collected: 08/02/22 1027    Lab Status: Final result Specimen: Tissue from Toe, Left Updated: 08/07/22 0858     Anaerobic Culture No anaerobes isolated at 5 days    Blood Culture - Blood, Hand, Left [[id number]]  (Normal) Collected: 07/27/22 2040    Lab Status: Final result Specimen: Blood from Hand, Left Updated: 08/01/22 2132     Blood Culture No growth at 5 days    Blood Culture - Blood, Hand, Right [[id number]]  (Normal) Collected: 07/27/22 2030    Lab Status: Final result Specimen: Blood from Hand, Right Updated: 08/01/22 2132     Blood Culture No growth at 5 days    COVID PRE-OP / PRE-PROCEDURE SCREENING ORDER (NO ISOLATION) - Swab, Nasopharynx [[id number]]  (Normal) Collected: 07/27/22 2246    Lab Status: Final result Specimen: Swab from Nasopharynx Updated: 07/27/22 2357    Narrative:      The following orders were created for panel order COVID PRE-OP / PRE-PROCEDURE SCREENING ORDER (NO ISOLATION) - Swab, Nasopharynx.  Procedure                               Abnormality         Status                     ---------                               -----------         ------                     COVID-19 and FLU A/B PCR...[[id number]]  Normal              Final result                 Please view results for these tests on the individual orders.    COVID-19 and FLU A/B PCR - Swab, Nasopharynx [[id number]]  (Normal) Collected: 07/27/22 2246    Lab Status: Final result Specimen: Swab from Nasopharynx Updated: 07/27/22 2357     COVID19 Not Detected     Influenza A PCR Not Detected     Influenza B PCR Not Detected    Narrative:      Fact sheet for providers: https://www.fda.gov/media/342899/download    Fact sheet for patients: https://www.fda.gov/media/522498/download    Test  performed by [facility].          CT Chest Without Contrast Diagnostic    Result Date: 8/12/2022  DATE OF EXAM: 8/12/2022 3:25 PM  PROCEDURE: CT CHEST WO CONTRAST DIAGNOSTIC-  INDICATIONS: respiratory failure, high oxygen requirement; S43.005A-Unspecified dislocation of left shoulder joint, initial encounter; L03.116-Cellulitis of left lower limb; Z78.9-Other specified health status  COMPARISON: No comparisons available.  TECHNIQUE: Routine transaxial slices were obtained through the chest without the administration of intravenous contrast. Reconstructed coronal and sagittal images were also obtained. Automated exposure control and iterative construction methods were used.  The radiation dose reduction device was turned on for each scan per the ALARA (As Low as Reasonably Achievable) protocol.  FINDINGS: There is no pathologic axillary adenopathy or other worrisome body wall soft tissue finding in the chest. No acute findings are present in the partially characterized upper abdomen. There are small bilateral pleural effusions. There is no pericardial effusion. There is no distinct pathologic mediastinal adenopathy. Mildly atherosclerotic, nonaneurysmal thoracic aorta. Evaluation of the osseous structures demonstrates no evidence of acute fracture or or aggressive osseous lesion, with multilevel thoracic spondylosis change present. Evaluation of the lung fields demonstrates interlobular septal thickening, with some intervening groundglass opacity, suggesting prominent component of pulmonary edema. There is some involved fairly symmetric consolidation present in the lower lobes, without additional specific evidence of superimposed pneumonia. Nonspecific grouped pulmonary nodules are present anteriorly at the right lung base, with a discrete nodule present measuring 10 mm.      Impression: Appearance most consistent with pulmonary edema, including small-to-moderate bilateral pleural effusions. Nonspecific pulmonary nodules  are present anteriorly near the right lung base, measuring up to 10 mm. Consider nonemergent follow-up CT chest without acute process has resolved.  This report was finalized on 8/12/2022 6:05 PM by Jermaine Wang.      NM Lung Scan Perfusion Particulate    Result Date: 8/12/2022  DATE OF EXAM: 8/12/2022 3:16 PM  PROCEDURE: NM LUNG SCAN PERFUSION PARTICULATE-  INDICATIONS: acute respiratory failure, r/o PE; S43.005A-Unspecified dislocation of left shoulder joint, initial encounter; L03.116-Cellulitis of left lower limb; Z78.9-Other specified health status  COMPARISON: Portable chest radiograph of same day  TECHNIQUE: 5.5 mCi of technetium 99m labeled MAA was administered intravenously for the perfusion portion of the pulmonary exam. Scintigraphic images of the lungs were obtained in multiple projections to assess perfusion.  FINDINGS: The perfusion pattern appears heterogeneous, which would likely be explained by the patient's pulmonary interstitial edema. No well-defined large or small segmental or subsegmental defects are identified. Study is considered low probability for pulmonary embolus.      Impression: Heterogeneous perfusion scan, which likely reflects the patient's pulmonary edema seen on concurrent chest radiograph. Study is considered low probability for pulmonary embolic disease.  This report was finalized on 8/12/2022 10:50 PM by Dr. Leodan Nelson MD.      XR Chest 1 View    Result Date: 8/14/2022  Single portable chest radiograph INDICATION:  Shortness of breath COMPARISON:  Chest radiograph 8/12/2022 FINDINGS: Right-sided PICC line terminates in the cavoatrial junction. Cardiac pacemaker. Pulmonary vascular congestion. Increased patchy bibasilar opacities. Pleural effusion, or pneumothorax. The cardiomediastinal silhouette is mildly enlarged as before. Mediastinal clips. Cardiac pacemaker. No acute focal bony abnormalities are seen. Median sternotomy.     Impression: Increased patchy bibasilar  opacities Cardiomegaly and pulmonary vascular congestion. Electronically signed by:  Melissa Flores M.D.  8/13/2022 11:44 PM Mountain Time      Results for orders placed during the hospital encounter of 07/27/22    Adult Transthoracic Echo Complete W/ Cont if Necessary Per Protocol    Interpretation Summary  · Left ventricular ejection fraction appears to be 46 - 50%. Left ventricular systolic function is low normal.  · Left ventricular septal hypokinesis  · No significant valvular heart disease      I have reviewed the medications:  Scheduled Meds:amLODIPine, 10 mg, Oral, Daily  ampicillin-sulbactam, 3 g, Intravenous, Q8H  aspirin, 81 mg, Oral, Daily  cetirizine, 5 mg, Oral, Daily  insulin detemir, 25 Units, Subcutaneous, Nightly  insulin lispro, 0-7 Units, Subcutaneous, TID AC  levothyroxine, 50 mcg, Oral, Q AM  metoprolol succinate XL, 50 mg, Oral, Q24H  polyethylene glycol, 17 g, Oral, Daily  senna-docusate sodium, 2 tablet, Oral, BID  sodium chloride, 10 mL, Intravenous, Q12H  sodium chloride, 10 mL, Intravenous, Q12H  vitamin D3, 5,000 Units, Oral, Daily      Continuous Infusions:heparin, 15 Units/kg/hr, Last Rate: 15 Units/kg/hr (08/14/22 3246)  Pharmacy to Dose Heparin,       PRN Meds:.•  acetaminophen **OR** acetaminophen **OR** acetaminophen  •  bisacodyl  •  dextrose  •  dextrose  •  glucagon (human recombinant)  •  heparin (porcine)  •  heparin (porcine)  •  HYDROcodone-acetaminophen  •  ipratropium-albuterol  •  nitroglycerin  •  ondansetron **OR** ondansetron  •  Pharmacy to Dose Heparin  •  phenol  •  prochlorperazine  •  sennosides-docusate  •  [COMPLETED] Insert peripheral IV **AND** sodium chloride  •  sodium chloride  •  sodium chloride    Assessment & Plan   Assessment & Plan     Active Hospital Problems    Diagnosis  POA   • Proximal phalanx fracture of the second digit extending into the second metatarsal joint [S92.919A]  Unknown   • Dislocation of left shoulder joint, initial encounter  [S43.005A]  Yes   • Cellulitis in diabetic foot (HCC) [E11.628, L03.119]  Yes   • Fall [W19.XXXA]  Yes   • Sepsis (HCC) [A41.9]  Yes   • Leukocytosis [D72.829]  Yes   • Lactic acidosis [E87.2]  Yes   • Elevated C-reactive protein (CRP) [R79.82]  Yes   • Elevated sed rate (elev SR) [R70.0]  Yes   • SSS (sick sinus syndrome) ([facility]) [I49.5]  Yes   • Hyperlipidemia [E78.5]  Yes   • Hypertension [I10]  Yes   • S/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 Au [Z95.1]  Not Applicable   • Hypothyroidism (acquired) [E03.9]  Yes   • Type 2 diabetes mellitus (HCC) [E11.9]  Yes      Resolved Hospital Problems   No resolved problems to display.        Brief Hospital Course to date:  Jermaine Singh is a 77 y.o. male with PMH significant for HTN, HLD, CAD s/p CABG, PVD s/p RLE stent, CKD III, insulin-dependent DMII and hypothyroidism. He was admitted to [facility] 7/27/22 for L shoulder dislocation after a fall as well as sepsis secondary to L great toe osteomyelitis.     L great toe cellulitis / osteomyelitis  Peripheral vascular disease   - Followed by Dr. Joseph of podiatry - was on PO Doxycycline outpatient  - Bone scan was concerning for osteomyelitis of L great toe, now s/p L great toe amputation by Dr. Márquez 8/2/22   - arterial duplex obtained. Cardiology does not recommend revascularization at this time   - tissue culture growing enterococcus faecalis and enterococcus casseliflavus  - ID following - changed to Unasyn, will need 6 weeks from 8/2/22 (9/13/2022)  - S/p PICC    Acute Respiratory Failure  Bilateral LE DVT's  Acute Systolic CHF  --?d/t CHF.  Cards diuresing, better today s/p  bumex 2mg yesterday.  Echo showed EF 46-50%  --weaned off of HFNC back to NC  --COVID negative on 7/27/2022, repeat swab  --VQ scan was low probability for PE but LE duplex prelim showed thrombus in right posterior tibial veins, peroneal veins, and small saphenous vein.  Left leg showed with thrombus left peroneal veins and small  saphenous vein.  Doing well on heparin gtt but now more fluid on cxr so will transition to eliquis to limit fluid.  ?if may have a PE if that could be cause of his recurring CP.  Troponin  --wheezing on exam today.  Already receiving neb and pretty severe.  Will give a one time dose of solumedrol (try to limit to not make CHF worse).  ?if further diuresis but kidney functioning worsening with diuresis so will consult nephrology to help.       Fall   Anterior L shoulder dislocation  - s/p reduction in the ED  - Appreciate ortho (Dr. Gipson) assistance   - Recommend non-operative treatment. Sling for comfort.   - PT for range of motion exercises, avoid Abduction and external rotation. WBAT on LUE with walker or knee scooter  - Follow up with Dr. Gipson in 2-3 weeks     Elevated Troponin  Acute Systolic CHF  --cards following, likely demand ischemia d/t acute CHF per cards (troponin peaked at 0.230). S/p diuresis, creatinine worse today so hold further diuresis today until seen by nephrology.   --troponin flat up to 0.230  --Echo EF 46-50%, LV septal HK, no significant VHD      Constipation  Nausea/Vomiting  - KUB with large stool burden otherwise no acute process present  - IVF, zofran and compazine PRN  - aggressive bowel regimen  - improved/resolved     Insulin-dependent DMII  - Hgb A1c 9.0%  - hypoglycemic yesterday AM so decreased Levemir from 30 units to 25units QHS.   Hold lispro for now + SSI      [place] - worsening  - Baseline creatinine appears to be 0.9-1.2  - IVF was restarted a few days ago but ?flash pulmonary edema over night so IVF stopped and now diuresising.  Worse  after diuresis so hold further diuretics for now.   CXR with fluid again today ?if d/t heparin gtt, will stop heparin gtt and transition to eliquis.  Consult nephrology today with help with fluid balance/[place]    Hypertension  Coronary artery disease s/p CABG  SSS s/p PPM   - Cardiology has stopped ACEI and added Amlodipine 10mg daily   -  Continue Metoprolol XL 50mg daily   - scheduled for 12/28/2022 1:30 pm with device check and should keep that appt.     Hypothyroid  - Continue levothyroxine    Expected Discharge Location and Transportation: rehab/[facility], but now ID anticipating LTAC  Expected Discharge Date: 8/16/22    DVT prophylaxis:  Medical and mechanical DVT prophylaxis orders are present.     AM-PAC 6 Clicks Score (PT): 14 (08/13/22 1702)     Updated wife at bedside on 8/14/2022    CODE STATUS:   Code Status and Medical Interventions:   Ordered at: 07/28/22 0000     Code Status (Patient has no pulse and is not breathing):    CPR (Attempt to Resuscitate)     Medical Interventions (Patient has pulse or is breathing):    Full Support       Reilly Thomas MD  08/14/22

## 2022-08-14 NOTE — SIGNIFICANT NOTE
Called per nursing secondary to pt with continued chest pain. Has been ongoing, but now remaining more constant. Followed by cardiology. Noted to have elevated troponin, thought to be related to demand ischemia with CHF. Prior EKGs reviewed.   Will repeat labs and EKG. Pt already on heparin gtt secondary to DVTs. Advised nursing to update cardiology as well since they are also following pt. EKG without significant changes

## 2022-08-14 NOTE — CONSULTS
Referring Provider: Dr Reilly Thomas  Reason for Consultation: Acute on chronic      Subjective     Chief complaint fall with left shoulder pain    History of present illness:   77-year-old male was admitted with fall and left shoulder pain, also had left foot to infection has been treated with antibiotic.  Initial creatinine has been within acceptable range, patient received IV vancomycin on starting 7/27/2022, was continued until 8/4/2022 at that time the creatinine started to go up to 2.02 patient's antibiotic was changed, taken off the vancomycin at that time creatinine started to get improved 1.8 slowly over the last 3 days creatinine has been slowly creeping up.  Patient has a past medical history of CAD, CABG, hyperlipidemia, diabetes type 2, hypertension, hypothyroidism, peripheral vascular disease, sick sinus syndrome with pacemaker placement.  Further work-up of left foot showed osteomyelitis and was started on antibiotic as noted above.  He had denied any epistaxis, paresis, hematemesis, gross hematuria, kidney stone, nonsteroidal use, diarrhea, constipation, recent rash, no family history of kidney disease.  Today's labs show a creatinine 2.07 BUN of 51, albumin 2.9, white count 14.2, platelets 209 normal.  Renal been consulted for further work-up for [place].  Patient is on ampicillin IV, and IV Solu-Medrol 60 mg.  History  Past Medical History:   Diagnosis Date   • Asthma    • Coronary artery disease    • Diabetes mellitus (HCC) 2000    started on inuslin 12/2018; started on po meds in 2000; checking blood sugars daily    • Disease of thyroid gland     po meds daily for hypothyroidism    • History of fracture as a child     rt leg- severe    • Hyperlipidemia    • Hypertension    • Hypothyroidism    • Peripheral neuropathy    • Peripheral vascular disease (HCC)     s/p angiogram 2/19-needs stent in left leg    • RBBB    • Right knee pain    • Vitamin D deficiency    ,   Past Surgical History:    Procedure Laterality Date   • APPENDECTOMY     • CARDIAC CATHETERIZATION N/A 2/14/2019    Procedure: Left Heart Cath;  Surgeon: Cooper Apodaca MD;  Location:  [facility];  Service: Cardiology   • CARDIAC ELECTROPHYSIOLOGY PROCEDURE N/A 5/18/2022    Procedure: DEVICE IMPLANT;  Surgeon: Cooper Apodaca MD;  Location:  [facility];  Service: Cardiology;  Laterality: N/A;   • COLONOSCOPY     • CORONARY ARTERY BYPASS GRAFT N/A 3/22/2019    Procedure: MEDIAN STERNOTOMY, CORONARY ARTERY BYPASS GRAFT X3, UTILIZING THE LEFT INTERNAL MAMMARY ARTERY, EVH AND OPEN HARVEST OF THE RIGHT GREATER SAPHENOUS VEIN, EXPLORATION OF THE LEFT LEG;  Surgeon: Ap Au MD;  Location:  [facility];  Service: Cardiothoracic   • EYE SURGERY Bilateral     cataracts    • INTERVENTIONAL RADIOLOGY PROCEDURE N/A 7/29/2021    Procedure: Abdominal Aortagram with Runoff;  Surgeon: Jermaine Hernandez MD;  Location:  [facility];  Service: Cardiovascular;  Laterality: N/A;   • KNEE ARTHROSCOPY      right x 2, left x 1   • LACERATION REPAIR      right leg   • LEG SURGERY      2 for fracture of rt leg    • TONSILLECTOMY      Adnoidectomy   • TRANS METATARSAL AMPUTATION Left 8/2/2022    Procedure: GREAT TOE AMPUTATION LEFT;  Surgeon: Gopal Márquez MD;  Location:  [facility];  Service: Vascular;  Laterality: Left;   ,   Family History   Problem Relation Age of Onset   • Coronary artery disease Mother    • Diabetes Mother    • Cancer Father    ,   Social History     Socioeconomic History   • Marital status:    • Number of children: 2   Tobacco Use   • Smoking status: Never Smoker   • Smokeless tobacco: Never Used   Substance and Sexual Activity   • Alcohol use: No   • Drug use: No   • Sexual activity: Defer     E-cigarette/Vaping     E-cigarette/Vaping Substances     E-cigarette/Vaping Devices       ,   Medications Prior to Admission   Medication Sig Dispense Refill Last Dose    • metoprolol succinate XL (TOPROL-XL) 50 MG 24 hr tablet Take 1 tablet by mouth Daily. 20 tablet 0 8/1/2022 at 1039   • amLODIPine (NORVASC) 5 MG tablet Take 5 mg by mouth Every Morning.      • aspirin 81 MG chewable tablet Chew 81 mg Daily.      • Blood Glucose Monitoring Suppl (ONE TOUCH ULTRA 2) w/Device kit USE TO CHECK GLUCOSE ONCE DAILY AS DIRECTED      • cetirizine (zyrTEC) 10 MG tablet Take 10 mg by mouth Daily.      • Cholecalciferol (VITAMIN D3) 5000 units tablet tablet Take 5,000 Units by mouth Daily.      • Continuous Blood Gluc  (DEXCOM G6 ) device See Admin Instructions.      • Continuous Blood Gluc Sensor (DEXCOM G6 SENSOR) APPLY 1 SENSOR AND CHANGE EVERY 10 DAYS      • Continuous Blood Gluc Sensor (Dexcom G6 Sensor) Every 10 (Ten) Days. 1 each 0    • Continuous Blood Gluc Transmit (DEXCOM G6 TRANSMITTER) misc ONE TRANSMITTER FOR EVERY 3 MONTHS      • insulin detemir (LEVEMIR FLEXTOUCH) 100 UNIT/ML injection Inject 44 Units under the skin into the appropriate area as directed Every Night. (Patient taking differently: Inject 34 Units under the skin into the appropriate area as directed Every Night.)      • insulin lispro (humaLOG) 100 UNIT/ML injection Inject 5 Units under the skin into the appropriate area as directed 3 (Three) Times a Day Before Meals. 1 each 1    • levothyroxine (SYNTHROID, LEVOTHROID) 50 MCG tablet Take 50 mcg by mouth Every Morning.      • MM PEN NEEDLES 32G X 4 MM misc USE 4 PER DAY      • sodium chloride (OCEAN) 0.65 % nasal spray 1 spray into the nostril(s) as directed by provider As Needed for Congestion.      , Scheduled Meds:  amLODIPine, 10 mg, Oral, Daily  ampicillin-sulbactam, 3 g, Intravenous, Q8H  apixaban, 10 mg, Oral, Q12H   Followed by  [START ON 8/21/2022] apixaban, 5 mg, Oral, Q12H  aspirin, 81 mg, Oral, Daily  cetirizine, 5 mg, Oral, Daily  insulin detemir, 25 Units, Subcutaneous, Nightly  insulin lispro, 0-7 Units, Subcutaneous, TID  AC  levothyroxine, 50 mcg, Oral, Q AM  metoprolol succinate XL, 50 mg, Oral, Q24H  polyethylene glycol, 17 g, Oral, Daily  senna-docusate sodium, 2 tablet, Oral, BID  sodium chloride, 10 mL, Intravenous, Q12H  sodium chloride, 10 mL, Intravenous, Q12H  vitamin D3, 5,000 Units, Oral, Daily    , Continuous Infusions:   , PRN Meds:  •  acetaminophen **OR** acetaminophen **OR** acetaminophen  •  bisacodyl  •  dextrose  •  dextrose  •  glucagon (human recombinant)  •  HYDROcodone-acetaminophen  •  ipratropium-albuterol  •  nitroglycerin  •  ondansetron **OR** ondansetron  •  phenol  •  prochlorperazine  •  sennosides-docusate  •  [COMPLETED] Insert peripheral IV **AND** sodium chloride  •  sodium chloride  •  sodium chloride and Allergies:  Patient has no known allergies.    Review of Systems  Pertinent items are noted in HPI, all other systems reviewed and negative    Objective     Vital Signs  Temp:  [97.6 °F (36.4 °C)-98.2 °F (36.8 °C)] 98.2 °F (36.8 °C)  Heart Rate:  [68-85] 73  Resp:  [16-22] 17  BP: ()/(46-88) 134/75    I/O this shift:  In: 856 [P.O.:440; I.V.:316; IV Piggyback:100]  Out: 200 [Urine:200]  I/O last 3 completed shifts:  In: 1240 [P.O.:840; IV Piggyback:400]  Out: 850 [Urine:850]    Physical Exam:  General Appearance: Alert, oriented, no obvious distress.   male comfortable in chair.  HEENT: Atraumatic normocephalic head, eyes pupil reactive, extraocular muscle intact, nose no bleed, oropharynx are clear, tongue is moist, neck is supple, no JVD, no lymph enlargement, no bruit, trachea midline.  Lungs: Bilateral wheezing heard, equal chest movement, nonlabored.  Heart: No gallop, murmur, rub, RRR.  Abdomen: Soft, nontender, positive bowel sounds, no organomegaly.  Extremities: No edema, no cyanosis.  Neuro: No focal deficit, moving all extremities, alert oriented X 3   no suprapubic fullness or tenderness.  Skin skin is warm and dry.  Left toe in bandage.  No skin rash  noted.    Results Review:   I reviewed the patient's new clinical results.  I reviewed the patient's new imaging results and agree with the interpretation.    WBC WBC   Date Value Ref Range Status   08/13/2022 14.07 (H) 3.40 - 10.80 10*3/mm3 Final   08/13/2022 14.42 (H) 3.40 - 10.80 10*3/mm3 Final   08/12/2022 13.76 (H) 3.40 - 10.80 10*3/mm3 Final      HGB Hemoglobin   Date Value Ref Range Status   08/13/2022 10.2 (L) 13.0 - 17.7 g/dL Final   08/13/2022 10.5 (L) 13.0 - 17.7 g/dL Final   08/12/2022 11.2 (L) 13.0 - 17.7 g/dL Final      HCT Hematocrit   Date Value Ref Range Status   08/13/2022 31.2 (L) 37.5 - 51.0 % Final   08/13/2022 31.4 (L) 37.5 - 51.0 % Final   08/12/2022 35.5 (L) 37.5 - 51.0 % Final      Platlets No results found for: LABPLAT   MCV MCV   Date Value Ref Range Status   08/13/2022 89.4 79.0 - 97.0 fL Final   08/13/2022 88.2 79.0 - 97.0 fL Final   08/12/2022 93.4 79.0 - 97.0 fL Final          Sodium Sodium   Date Value Ref Range Status   08/13/2022 139 136 - 145 mmol/L Final   08/13/2022 143 136 - 145 mmol/L Final   08/12/2022 146 (H) 136 - 145 mmol/L Final      Potassium Potassium   Date Value Ref Range Status   08/13/2022 4.0 3.5 - 5.2 mmol/L Final   08/13/2022 3.8 3.5 - 5.2 mmol/L Final   08/12/2022 4.2 3.5 - 5.2 mmol/L Final      Chloride Chloride   Date Value Ref Range Status   08/13/2022 99 98 - 107 mmol/L Final   08/13/2022 104 98 - 107 mmol/L Final   08/12/2022 103 98 - 107 mmol/L Final      CO2 CO2   Date Value Ref Range Status   08/13/2022 27.0 22.0 - 29.0 mmol/L Final   08/13/2022 28.0 22.0 - 29.0 mmol/L Final   08/12/2022 26.0 22.0 - 29.0 mmol/L Final      BUN BUN   Date Value Ref Range Status   08/13/2022 51 (H) 8 - 23 mg/dL Final   08/13/2022 51 (H) 8 - 23 mg/dL Final   08/12/2022 47 (H) 8 - 23 mg/dL Final      Creatinine Creatinine   Date Value Ref Range Status   08/13/2022 2.07 (H) 0.76 - 1.27 mg/dL Final   08/13/2022 1.94 (H) 0.76 - 1.27 mg/dL Final   08/12/2022 1.84 (H) 0.76 - 1.27  mg/dL Final      Calcium Calcium   Date Value Ref Range Status   08/13/2022 8.5 (L) 8.6 - 10.5 mg/dL Final   08/13/2022 8.4 (L) 8.6 - 10.5 mg/dL Final   08/12/2022 8.8 8.6 - 10.5 mg/dL Final      PO4 No results found for: CAPO4   Albumin Albumin   Date Value Ref Range Status   08/13/2022 2.90 (L) 3.50 - 5.20 g/dL Final      Magnesium Magnesium   Date Value Ref Range Status   08/13/2022 2.7 (H) 1.6 - 2.4 mg/dL Final   08/12/2022 2.2 1.6 - 2.4 mg/dL Final      Uric Acid No results found for: URICACID         amLODIPine, 10 mg, Oral, Daily  ampicillin-sulbactam, 3 g, Intravenous, Q8H  apixaban, 10 mg, Oral, Q12H   Followed by  [START ON 8/21/2022] apixaban, 5 mg, Oral, Q12H  aspirin, 81 mg, Oral, Daily  cetirizine, 5 mg, Oral, Daily  insulin detemir, 25 Units, Subcutaneous, Nightly  insulin lispro, 0-7 Units, Subcutaneous, TID AC  levothyroxine, 50 mcg, Oral, Q AM  metoprolol succinate XL, 50 mg, Oral, Q24H  polyethylene glycol, 17 g, Oral, Daily  senna-docusate sodium, 2 tablet, Oral, BID  sodium chloride, 10 mL, Intravenous, Q12H  sodium chloride, 10 mL, Intravenous, Q12H  vitamin D3, 5,000 Units, Oral, Daily           Assessment & Plan       Type 2 diabetes mellitus (HCC)    Hypertension    Hyperlipidemia    Hypothyroidism (acquired)    S/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 Au    SSS (sick sinus syndrome) ([facility])    Dislocation of left shoulder joint, initial encounter    Cellulitis in diabetic foot ([facility])    Fall    Sepsis ([facility])    Leukocytosis    Lactic acidosis    Elevated C-reactive protein (CRP)    Elevated sed rate (elev SR)    Proximal phalanx fracture of the second digit extending into the second metatarsal joint    1.  Acute kidney injury: Baseline creatinine 1.02-1.05.  Patient was admitted and was started on antibiotic for osteomyelitis.  He received vancomycin which was started on 7/27/2022 until 8/4/2022 due to acute rise in creatinine to 2.07.  Patient had some improvement renal function until recently  creatinine started to go up again.  Renal been consulted at this time.  No UAs available.  Patient had acute drop in blood pressure other than that no other finding.  [place] could be related to antibiotic or postinfectious GN.  2.  Osteomyelitis left toe.  3.  Type 2 diabetes.  4.  History of hypertension  5.  History of hyperlipidemia.  6.  CAD s/p CABG 3/22/2019 by Dr. Au's.  7.  Sick sinus syndrome.    Recommendations plan.    Check ultrasound of the kidney for size of the cortex and to rule out any obstruction.  Renal artery duplex to rule out renal artery stenosis.  Urine lab includes urine sodium, urine protein, urine creatinine.  With ongoing infection we will check C3-C4 levels.  Check TRISH.  Avoid nephrotoxic medications.  Keep systolic blood pressure greater than 100.  Check volume status.  Check labs in the morning.  Daily evaluation for renal replacement therapy will be done.  Adjust medication for the new GFR.  Case discussed with the medical staff taking care of the patient.  High risk patient multiple medical problems.  I discussed the patients findings and my recommendations with patient and family    Daphne Lal MD  08/14/22  @NOW

## 2022-08-14 NOTE — PROGRESS NOTES
"  [facility]  PROGRESS NOTE    Date of Admission: 7/27/2022  Date of Service: 08/14/22    Primary Care Physician: Farooq Painter MD    Chief Complaint: f/u HTN, CAD, PAD  Problem List:   Type 2 diabetes mellitus (HCC)    Hypertension    Hyperlipidemia    Hypothyroidism (acquired)    S/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 Au    SSS (sick sinus syndrome) (HCC)    Dislocation of left shoulder joint, initial encounter    Cellulitis in diabetic foot (HCC)    Fall    Sepsis (HCC)    Leukocytosis    Lactic acidosis    Elevated C-reactive protein (CRP)    Elevated sed rate (elev SR)    Proximal phalanx fracture of the second digit extending into the second metatarsal joint      Subjective      Patient had an episode of sharp right sided chest pain last night with no EKG or troponin changes. He states he feels better in terms of dyspnea, however is wheezing significantly on exam.     Objective   Vitals: /59 (BP Location: Left arm, Patient Position: Lying)   Pulse 69   Temp 97.9 °F (36.6 °C) (Axillary)   Resp 22   Ht 190.5 cm (75\")   Wt 98 kg (216 lb 0.8 oz)   SpO2 93%   BMI 27.00 kg/m²     Physical Exam:  GENERAL: Alert, cooperative, in no acute distress.   HEENT: Normocephalic, no jugular venous distention  HEART: Regular rhythm, normal rate, and no murmurs, gallops, or rubs.   LUNGS: Audible wheezing throughout all lung fields with coarse lung sounds, On 5L NC  ABDOMEN: Soft, bowel sounds present, nontender   NEUROLOGIC: No focal abnormalities involving strength or sensation are noted.   EXTREMITIES: No clubbing, cyanosis, or edema noted.     Results:  Results from last 7 days   Lab Units 08/13/22  2310 08/13/22  1224 08/12/22  0941   WBC 10*3/mm3 14.07* 14.42* 13.76*   HEMOGLOBIN g/dL 10.2* 10.5* 11.2*   HEMATOCRIT % 31.2* 31.4* 35.5*   PLATELETS 10*3/mm3 226 209 236     Results from last 7 days   Lab Units 08/13/22  2310 08/13/22  0730 08/12/22  0941   SODIUM mmol/L 139 " 143 146*   POTASSIUM mmol/L 4.0 3.8 4.2   CHLORIDE mmol/L 99 104 103   CO2 mmol/L 27.0 28.0 26.0   BUN mg/dL 51* 51* 47*   CREATININE mg/dL 2.07* 1.94* 1.84*   GLUCOSE mg/dL 151* 63* 206*      Lab Results   Component Value Date    CHOL 180 08/04/2022    TRIG 160 (H) 08/04/2022    HDL 39 (L) 08/04/2022     (H) 08/04/2022    AST 12 08/13/2022    ALT 13 08/13/2022         Results from last 7 days   Lab Units 08/13/22  1224 08/11/22  0023   PROTIME Seconds 13.8 13.6   INR  1.07 1.05   APTT seconds 29.7* 33.1*     Results from last 7 days   Lab Units 08/13/22  2310 08/13/22  1224 08/13/22  0730   TROPONIN T ng/mL 0.230* 0.229* 0.230*     Results from last 7 days   Lab Units 08/13/22  2310   PROBNP pg/mL 31,919.0*       Intake/Output Summary (Last 24 hours) at 8/14/2022 0857  Last data filed at 8/14/2022 0801  Gross per 24 hour   Intake 810 ml   Output 850 ml   Net -40 ml       I personally reviewed the patient's EKG/Telemetry data    Radiology Data:   CXR 8/14/22:  IMPRESSION:     Increased patchy bibasilar opacities     Cardiomegaly and pulmonary vascular congestion.    Current Medications:  amLODIPine, 10 mg, Oral, Daily  ampicillin-sulbactam, 3 g, Intravenous, Q8H  aspirin, 81 mg, Oral, Daily  cetirizine, 5 mg, Oral, Daily  insulin detemir, 25 Units, Subcutaneous, Nightly  insulin lispro, 0-7 Units, Subcutaneous, TID AC  levothyroxine, 50 mcg, Oral, Q AM  metoprolol succinate XL, 50 mg, Oral, Q24H  polyethylene glycol, 17 g, Oral, Daily  senna-docusate sodium, 2 tablet, Oral, BID  sodium chloride, 10 mL, Intravenous, Q12H  sodium chloride, 10 mL, Intravenous, Q12H  vitamin D3, 5,000 Units, Oral, Daily      heparin, 15 Units/kg/hr, Last Rate: 15 Units/kg/hr (08/14/22 3804)  Pharmacy to Dose Heparin,         Assessment and Plan:   1.  Peripheral Vascular Disease              -  Remote Bilateral iliac stenting with significant infrapopliteal disease, good collateral flow.              - He has excellent pulses  bilaterally by doppler              -  POD 12 p Left great toe transmetatarsal amputation.              -  Continue PT/OT     2.  CAD              -  Remote CABG              -  Continue ASA, CCB              -  Slight elevation in troponins but stable and not evolving - demand ischemia.  EKG shows no acute findings               -  Repeat echo shows EF 46-50%, LV septal HK, no significant valvular disease.   - pain is sharp, atypical and different than prior angina     3.  Osteomyelitis              -  XRay suggesting osteo, s/p left great toe amputation               -  IV Antibiotics per ID.  Dr. Márquez will continue to follow.       4.  DMII              -  Per primary service.     5.  Essential Hypertension              -  Better controlled              -  Continue home dose Toprol XL 50mg daily.  Trialed low dose Lisinopril with rise in Cr.   Discontinued ACEI.   Increased Norvasc to 10mg daily and continue to monitor.               -   Cr up to 2.0     6.  Hyperlipidemia              -  Lipid panel reviewed.  , , HDL 39, .              -  Will address statin.     7.  Left shoulder dislocation              -  S/P fall prompting ED visit/admission.              -  PT/OT         8.  [place]              -  Cr 2.0              -  ACEI discontinued.              -  Continue with daily labs   -  Consider Nephrology consult      9.  Hypoxia              -  Pt was on high flow O2, now down to 5L               -  CXR last night with increased pulmonary edema, recommend diuresis with IV Bumex 1mg now if OK with nephrology with close attention to renal function              - VQ scan low probability for PE, LE venous duplex positive for bilateral distal LE DVTs    10. Bilateral LE DVTs   - Agree with Heparin gtt    - VQ scan 8/12 low probability for PE        76 yo CM with known CAD/CABG, PVDz with prior stenting.  Presents after fall resulting in left shoulder dislocation as well as presenting with left  toe wound.  Inflammatory markers and WBC elevated, Xray suggests osteomyelitis.  He has known PVDz with severe infrapopliteal disease.  However, he has bilateral collateral flow and good pulses by doppler bilaterally.  At this time, there is no need for revascularization of the LLE     Dr. Apodaca to resume care tomorrow          Electronically signed by Lubna Guidry PA-C, 08/14/22, 9:00 AM EDT.

## 2022-08-14 NOTE — THERAPY TREATMENT NOTE
Patient Name: Jermaine Singh  : 1945    MRN: [id number]                              Today's Date: 2022       Admit Date: 2022    Visit Dx:     ICD-10-CM ICD-9-CM   1. Dislocation of left shoulder joint, initial encounter  S43.005A 831.00   2. Cellulitis of left foot  L03.116 682.7   3. 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  Preserved LV function   • Type 2 diabetes mellitus ([facility])   • Hypertension   • Hyperlipidemia   • Hypothyroidism (acquired)   • Vitamin D deficiency on Rx    • Serum Cr 1.32 on admission.  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 Au   • Dizziness   • Atherosclerosis of native artery of both lower extremities with intermittent claudication ([facility])   • SSS (sick sinus syndrome) ([facility])   • Dislocation of left shoulder joint, initial encounter   • Cellulitis in diabetic foot ([facility])   • Fall   • Sepsis ([facility])   • Leukocytosis   • Lactic acidosis   • Elevated C-reactive protein (CRP)   • Elevated sed rate (elev SR)   • Proximal phalanx fracture of the second digit extending into the second metatarsal joint     Past Medical History:   Diagnosis Date   • Asthma    • Coronary artery disease    • Diabetes mellitus ([facility])     started on [place] 2018; started on po meds in ; checking blood sugars daily    • Disease of thyroid gland     po meds daily for hypothyroidism    • History of fracture as a child     rt leg- severe    • Hyperlipidemia    • Hypertension    • Hypothyroidism    • Peripheral neuropathy    • Peripheral vascular disease ([facility])     s/p angiogram -needs stent in left leg    • RBBB    • Right knee pain    • Vitamin D deficiency      Past Surgical History:   Procedure Laterality Date   • APPENDECTOMY     • CARDIAC CATHETERIZATION N/A 2019    Procedure: Left Heart Cath;  Surgeon: Paulie  Cooper TRACEY MD;  Location:  [facility] CATH INVASIVE LOCATION;  Service: Cardiology   • CARDIAC ELECTROPHYSIOLOGY PROCEDURE N/A 5/18/2022    Procedure: DEVICE IMPLANT;  Surgeon: Cooper Apodaca MD;  Location:  [facility] CATH INVASIVE LOCATION;  Service: Cardiology;  Laterality: N/A;   • COLONOSCOPY     • CORONARY ARTERY BYPASS GRAFT N/A 3/22/2019    Procedure: MEDIAN STERNOTOMY, CORONARY ARTERY BYPASS GRAFT X3, UTILIZING THE LEFT INTERNAL MAMMARY ARTERY, EVH AND OPEN HARVEST OF THE RIGHT GREATER SAPHENOUS VEIN, EXPLORATION OF THE LEFT LEG;  Surgeon: Ap Au MD;  Location:  [facility] OR;  Service: Cardiothoracic   • EYE SURGERY Bilateral     cataracts    • INTERVENTIONAL RADIOLOGY PROCEDURE N/A 7/29/2021    Procedure: Abdominal Aortagram with Runoff;  Surgeon: Jermaine Hernandez MD;  Location:  [facility] CATH INVASIVE LOCATION;  Service: Cardiovascular;  Laterality: N/A;   • KNEE ARTHROSCOPY      right x 2, left x 1   • LACERATION REPAIR      right leg   • LEG SURGERY      2 for fracture of rt leg    • TONSILLECTOMY      Adnoidectomy   • TRANS METATARSAL AMPUTATION Left 8/2/2022    Procedure: GREAT TOE AMPUTATION LEFT;  Surgeon: Gopal Márquez MD;  Location:  [facility];  Service: Vascular;  Laterality: Left;      General Information     Row Name 08/14/22 1502          Physical Therapy Time and Intention    Document Type therapy note (daily note)  -     Mode of Treatment physical therapy  -     Row Name 08/14/22 1503          General Information    Patient Profile Reviewed yes  -HP     Existing Precautions/Restrictions fall;left;non-weight bearing;other (see comments);oxygen therapy device and L/min  NWB LLE; LUE WBAT - No ER/ABD, BLE DVT - on heparin drip and MD cleared for mobility, offloading shoe  -HP     Barriers to Rehab medically complex;previous functional deficit;ineffective coping  -     Row Name 08/14/22 1502          Cognition    Orientation Status (Cognition) oriented x 4  -HP     Row Name 08/14/22  1505          Safety Issues, Functional Mobility    Safety Issues Affecting Function (Mobility) other (see comments)  Pt notes feeling very fatigued, weak, dizzy, and lethargic upon arrival  -     Impairments Affecting Function (Mobility) endurance/activity tolerance;pain;strength  -     Comment, Safety Issues/Impairments (Mobility) Unable to assess due to pt denying increased mobility out of chair  -           User Key  (r) = Recorded By, (t) = Taken By, (c) = Cosigned By    Initials Name Provider Type     Everett Diaz, PT Physical Therapist               Mobility     Row Name 08/14/22 1511          Bed Mobility    Rolling Left [place] (Bed Mobility) not tested  -     Comment, (Bed Mobility) UIC upon arrival  -     Row Name 08/14/22 1511          Transfers    Comment, (Transfers) Deferred out of chair mobility  -     Row Name 08/14/22 1511          Bed-Chair Transfer    Bed-Chair [place] (Transfers) not tested  -     Row Name 08/14/22 1511          Sit-Stand Transfer    Sit-Stand [place] (Transfers) not tested  -     Row Name 08/14/22 1511          Gait/Stairs (Locomotion)    [place] Level (Gait) not tested  -     Comment, (Gait/Stairs) not tested with session as pt denied out of chair mobility  -     Row Name 08/14/22 1511          Mobility    Extremity Weight-bearing Status left upper extremity;left lower extremity  -     Left Upper Extremity (Weight-bearing Status) weight-bearing as tolerated (WBAT)  -     Left Lower Extremity (Weight-bearing Status) non weight-bearing (NWB)   -           User Key  (r) = Recorded By, (t) = Taken By, (c) = Cosigned By    Initials Name Provider Type     Everett Diaz, PT Physical Therapist               Obj/Interventions     Row Name 08/14/22 1512          Motor Skills    Therapeutic Exercise shoulder;hip;knee;ankle  Therex included 10 each of: heel slides, ankle pumps, quad set, glute sets, SLR, elbow flexion, elbow  pronation/supination  -HP           User Key  (r) = Recorded By, (t) = Taken By, (c) = Cosigned By    Initials Name Provider Type    HP Everett Diaz, PT Physical Therapist               Goals/Plan    No documentation.                Clinical Impression     Row Name 08/14/22 1513          Pain    Pretreatment Pain Rating 0/10 - no pain  -HP     Posttreatment Pain Rating 0/10 - no pain  -HP     Pain Location - Side/Orientation Left  -HP     Pain Location generalized  -HP     Pain Location - shoulder  -HP     Pre/Posttreatment Pain Comment Pt notes overall feeling of weakness, nausea, fatigue, and dizziness  -HP     Row Name 08/14/22 1513          Plan of Care Review    Plan of Care Reviewed With patient;daughter  -     Progress improving  -HP     Outcome Evaluation Upon arrival, pt notes that he was have increased intensity of nausea, dizziness, fatigue, and weakness. Pt denied out of chair mobility and was agreeable to minimal therex in the chair. LE and UE therex completed. Continue to recommend d/c to IPR when appropriate.  -     Row Name 08/14/22 1513          Therapy Assessment/Plan (PT)    Rehab Potential (PT) fair, will monitor progress closely  -     Criteria for Skilled Interventions Met (PT) yes;meets criteria;skilled treatment is necessary  -     Therapy Frequency (PT) daily  -HP     Row Name 08/14/22 1513          Vital Signs    Pre Systolic BP Rehab --  RN cleared  -HP     O2 Delivery Pre Treatment hi-flow  -HP     O2 Delivery Intra Treatment hi-flow  -HP     O2 Delivery Post Treatment hi-flow  -HP     Pre Patient Position Sitting  -HP     Intra Patient Position Sitting  -HP     Post Patient Position Sitting  -HP     Row Name 08/14/22 1513          Positioning and Restraints    Pre-Treatment Position sitting in chair/recliner  -HP     Post Treatment Position chair  -HP     In Chair notified nsg;reclined;call light within reach;encouraged to call for assist;exit alarm on;with family/caregiver   -           User Key  (r) = Recorded By, (t) = Taken By, (c) = Cosigned By    Initials Name Provider Type     Everett Diza PT Physical Therapist               Outcome Measures     Row Name 08/14/22 1517          How much help from another person do you currently need...    Turning from your back to your side while in flat bed without using bedrails? 2  -HP     Moving from lying on back to sitting on the side of a flat bed without bedrails? 2  -HP     Moving to and from a bed to a chair (including a wheelchair)? 2  -HP     Standing up from a chair using your arms (e.g., wheelchair, bedside chair)? 2  -HP     Climbing 3-5 steps with a railing? 2  -HP     To walk in hospital room? 2  -HP     AM-PAC 6 Clicks Score (PT) 12  -HP     Highest level of mobility 4 --> Transferred to chair/commode  -HP     Row Name 08/14/22 1517          Functional Assessment    Outcome Measure Options AM-PAC 6 Clicks Basic Mobility (PT)  -HP           User Key  (r) = Recorded By, (t) = Taken By, (c) = Cosigned By    Initials Name Provider Type    Everett Frias PT Physical Therapist                             Physical Therapy Education                 Title: PT OT SLP Therapies (Done)     Topic: Physical Therapy (Done)     Point: Mobility training (Done)     Learning Progress Summary           Patient Acceptance, E, VU by  at 8/14/2022 1518   Family Eager, E,H, VU,DU,NR by  at 8/13/2022 1703    Comment: Reviewed safety/technique with transfers, NWB status, HEP, PT POC      Show all documentation for this point (8)                 Point: Home exercise program (Done)     Learning Progress Summary           Patient Acceptance, E, VU by  at 8/14/2022 1518   Family Eager, E,H, VU,DU,NR by  at 8/13/2022 1703    Comment: Reviewed safety/technique with transfers, NWB status, HEP, PT POC      Show all documentation for this point (8)                 Point: Body mechanics (Done)     Learning Progress Summary           Patient  Acceptance, E, VU by  at 8/14/2022 1518   Family Eager, E,H, VU,DU,NR by  at 8/13/2022 1703    Comment: Reviewed safety/technique with transfers, NWB status, HEP, PT POC      Show all documentation for this point (8)                 Point: Precautions (Done)     Learning Progress Summary           Patient Acceptance, E, VU by  at 8/14/2022 1518   Family Eager, E,H, VU,DU,NR by  at 8/13/2022 1703    Comment: Reviewed safety/technique with transfers, NWB status, HEP, PT POC      Show all documentation for this point (8)                             User Key     Initials Effective Dates Name Provider Type Discipline     06/02/21 -  Everett Diaz, PT Physical Therapist PT     06/01/21 -  Jenna Stanley PT Physical Therapist PT              PT Recommendation and Plan     Plan of Care Reviewed With: patient, daughter  Progress: improving  Outcome Evaluation: Upon arrival, pt notes that he was have increased intensity of nausea, dizziness, fatigue, and weakness. Pt denied out of chair mobility and was agreeable to minimal therex in the chair. LE and UE therex completed. Continue to recommend d/c to IPR when appropriate.     Time Calculation:    PT Charges     Row Name 08/14/22 1455             Time Calculation    Start Time 1455  -HP      PT Received On 08/14/22  -      PT Goal Re-Cert Due Date 08/23/22  -              Timed Charges    16907 - PT Therapeutic Activity Minutes 8  -HP              Total Minutes    Timed Charges Total Minutes 8  -HP       Total Minutes 8  -HP            User Key  (r) = Recorded By, (t) = Taken By, (c) = Cosigned By    Initials Name Provider Type     Everett Diaz PT Physical Therapist              Therapy Charges for Today     Code Description Service Date Service Provider Modifiers Qty    [id number] HC PT THERAPEUTIC ACT EA 15 MIN 8/14/2022 Everett Diaz, PT GP 1          PT G-Codes  Outcome Measure Options: AM-PAC 6 Clicks Basic Mobility (PT)  AM-PAC 6 Clicks Score  (PT): 12  AM-PAC 6 Clicks Score (OT): 15    Everett Diaz, PT  8/14/2022

## 2022-08-14 NOTE — PROGRESS NOTES
Cardiothoracic Surgery Progress Note      POD #: 12-left great toe transmetatarsal amputation primary closure     LOS: 18 days      Subjective: Awake and alert    Objective:  Vital Signs vital signs below noted T-max past 24 hours 90.2 °F  Temp:  [97.6 °F (36.4 °C)-98.2 °F (36.8 °C)] 97.9 °F (36.6 °C)  Heart Rate:  [68-85] 69  Resp:  [16-20] 18  BP: (105-172)/(46-89) 148/76    Physical Exam:   General Appearance: Oriented x3   Lungs:   Heart:   Skin:   Incision: Amputation site dressing change.  Suture intact skin margin viable skin flaps are viable.   Results:  Results from last 7 days   Lab Units 08/13/22  2310   WBC 10*3/mm3 14.07*   HEMOGLOBIN g/dL 10.2*   HEMATOCRIT % 31.2*   PLATELETS 10*3/mm3 226     Results from last 7 days   Lab Units 08/13/22  2310   SODIUM mmol/L 139   POTASSIUM mmol/L 4.0   CHLORIDE mmol/L 99   CO2 mmol/L 27.0   BUN mg/dL 51*   CREATININE mg/dL 2.07*   GLUCOSE mg/dL 151*   CALCIUM mg/dL 8.5*         Assessment:  #1.  Postop day 12 left great toe transmetatarsal amputation primary closure healing at this time  2 status post remote coronary artery bypass grafting  3 status post remote pacemaker placement for sick sinus syndrome  4 recent fall left shoulder dislocation was reduced in emergency department prior to this admission  5.  Endovascular stenting angioplasty right lower extremity per Dr. Hernandez 2019  6.  Pulm flash pulmonary edema cardiac ischemia related.    Plan: Medical manage per hospitalist/cardiologist continue daily dose change amputation site.  Antibiotics per infectious disease.      Gopal Márquez MD - 08/14/22 - 06:09 EDT

## 2022-08-14 NOTE — PROGRESS NOTES
Jermaine JIMENES Francisco  1945  [id number]    Date of Consult: 7/28/22    Admission Date: 7/27/2022      Requesting Provider: No ref. provider found  Evaluating Physician: Sage Braga MD    Reason for Consultation: Evaluation of toe wound    History of present illness:    Patient is a 77 y.o. male with coronary disease history of CABG diabetes mellitus type 2 hypothyroidism hypertension hyperlipidemia prevascular disease presents the emergency room after having a fall and injuring left shoulder patient tripped while walking his dog.  Coincidentally patient wears an orthotic shoe as recommended by a podiatrist has been on oral doxycycline for a left toe wound we are asked to evaluate this patient to start on broad-spectrum antibiotics occluding vancomycin and Zosyn.  X-ray of left foot showed soft tissue swelling in the forefoot without obvious fracture or osteomyelitis.    Has history of pacemaker    History of vascular stent placed in right leg.    7/29/2022; is having bone scan today denies fevers rash sore throat or diarrhea    7/30/22:  Bone scan compatible with osteomyelitis distal phalanx of first digit, acute fracture 2nd digit.  Afebrile. Blood cultures no growth to date.     7/31/22:  Afebrile.   Dr. Márquez consulting Dr. Apodaca for vascular studies left lower extremity.   No n/v/d.  Complains of shoulder pain.     8/2/22; doing well; had surgery today; toe amputation, no fever, rash, sore throat    8/3/22; no events overnight; resting quietly no events overnight    8/4/22; doing well; no events overnight; no fever, rash, sore throat;   8/5/22; no events overnight; no fever, rash, sore throat, no diarrhea    8/8/22; no events overnight; no fever, rash, sore throat    8/9/22; no events overnight; no fever, rash, sore throat no diarrhea  8/11/22; patient on hfnc, followed by cardiology being worked up for elevated troponin, pobnp.  Resting quietly    8/12/22; on hfnc, getting diuresed, no fever, rahs, sore  throat has nausea, reports chest pain    8/14/22: on 4L O2.  Denies f/c, sore throat, n/v/d, rashes.     Past Medical History:   Diagnosis Date    Asthma     Coronary artery disease     Diabetes mellitus (HCC) 2000    started on inuslin 12/2018; started on po meds in 2000; checking blood sugars daily     Disease of thyroid gland     po meds daily for hypothyroidism     History of fracture as a child     rt leg- severe     Hyperlipidemia     Hypertension     Hypothyroidism     Peripheral neuropathy     Peripheral vascular disease (HCC)     s/p angiogram 2/19-needs stent in left leg     RBBB     Right knee pain     Vitamin D deficiency        Past Surgical History:   Procedure Laterality Date    APPENDECTOMY      CARDIAC CATHETERIZATION N/A 2/14/2019    Procedure: Left Heart Cath;  Surgeon: Cooper Apodaca MD;  Location:  [facility];  Service: Cardiology    CARDIAC ELECTROPHYSIOLOGY PROCEDURE N/A 5/18/2022    Procedure: DEVICE IMPLANT;  Surgeon: Cooper Apodaca MD;  Location:  [facility];  Service: Cardiology;  Laterality: N/A;    COLONOSCOPY      CORONARY ARTERY BYPASS GRAFT N/A 3/22/2019    Procedure: MEDIAN STERNOTOMY, CORONARY ARTERY BYPASS GRAFT X3, UTILIZING THE LEFT INTERNAL MAMMARY ARTERY, EVH AND OPEN HARVEST OF THE RIGHT GREATER SAPHENOUS VEIN, EXPLORATION OF THE LEFT LEG;  Surgeon: Ap Au MD;  Location: [facility];  Service: Cardiothoracic    EYE SURGERY Bilateral     cataracts     INTERVENTIONAL RADIOLOGY PROCEDURE N/A 7/29/2021    Procedure: Abdominal Aortagram with Runoff;  Surgeon: Jermaine Hernandez MD;  Location:  [facility];  Service: Cardiovascular;  Laterality: N/A;    KNEE ARTHROSCOPY      right x 2, left x 1    LACERATION REPAIR      right leg    LEG SURGERY      2 for fracture of rt leg     TONSILLECTOMY      Adnoidectomy    TRANS METATARSAL AMPUTATION Left 8/2/2022    Procedure: GREAT TOE AMPUTATION LEFT;   Surgeon: Gopal Márquez MD;  Location: [facility];  Service: Vascular;  Laterality: Left;       Family History   Problem Relation Age of Onset    Coronary artery disease Mother     Diabetes Mother     Cancer Father        Social History     Socioeconomic History    Marital status:     Number of children: 2   Tobacco Use    Smoking status: Never Smoker    Smokeless tobacco: Never Used   Substance and Sexual Activity    Alcohol use: No    Drug use: No    Sexual activity: Defer       No Known Allergies      Medication:    Current Facility-Administered Medications:     acetaminophen (TYLENOL) tablet 650 mg, 650 mg, Oral, Q4H PRN, 650 mg at 08/06/22 2110 **OR** acetaminophen (TYLENOL) 160 MG/5ML solution 650 mg, 650 mg, Oral, Q4H PRN **OR** acetaminophen (TYLENOL) suppository 650 mg, 650 mg, Rectal, Q4H PRN, Nishant Allan, PA    amLODIPine (NORVASC) tablet 10 mg, 10 mg, Oral, Daily, LocklarNishant C, PA, 10 mg at 08/14/22 0803    ampicillin-sulbactam 3 g/100 mL 0.9% NS IVPB, 3 g, Intravenous, Q8H, Sage Braga MD, Last Rate: 0 mL/hr at 08/10/22 0133, 3 g at 08/14/22 0801    aspirin chewable tablet 81 mg, 81 mg, Oral, Daily, LocklarNishant C, PA, 81 mg at 08/14/22 0802    bisacodyl (DULCOLAX) suppository 10 mg, 10 mg, Rectal, Daily PRN, LockNishant boyd C, PA, 10 mg at 08/12/22 2041    cetirizine (zyrTEC) tablet 5 mg, 5 mg, Oral, Daily, LocklarNishant C, PA, 5 mg at 08/14/22 0802    dextrose (D50W) (25 g/50 mL) IV injection 25 g, 25 g, Intravenous, Q15 Min PRN, Nishant Allan PA    dextrose (GLUTOSE) oral gel 15 g, 15 g, Oral, Q15 Min PRN, LocklarNishant C, PA    glucagon (human recombinant) (GLUCAGEN DIAGNOSTIC) injection 1 mg, 1 mg, Intramuscular, Q15 Min PRN, Nishant Allan PA    heparin (porcine) injection 2,000 Units, 2,000 Units, Intravenous, William YANG Andrea L, MD    heparin (porcine) injection 4,000 Units, 4,000 Units, Intravenous, William YANG Andrea L, MD    heparin 77621 units/250 mL (100  units/mL) in 0.45 % NaCl infusion, 15 Units/kg/hr, Intravenous, Titrated, Reilly Thomas MD, Last Rate: 14.7 mL/hr at 08/14/22 0509, 15 Units/kg/hr at 08/14/22 0509    HYDROcodone-acetaminophen (NORCO) 5-325 MG per tablet 1 tablet, 1 tablet, Oral, Q4H PRN, Peter Braga DO, 1 tablet at 08/14/22 1133    insulin detemir (LEVEMIR) injection 25 Units, 25 Units, Subcutaneous, Nightly, Reilly Thomas MD, 12 Units at 08/13/22 2102    Insulin Lispro (humaLOG) injection 0-7 Units, 0-7 Units, Subcutaneous, TID Wendy LOYA Christine S., PA-C, 2 Units at 08/13/22 1701    ipratropium-albuterol (DUO-NEB) nebulizer solution 3 mL, 3 mL, Nebulization, Q4H PRN, Eliana Hightower APRN, 3 mL at 08/14/22 1102    levothyroxine (SYNTHROID, LEVOTHROID) tablet 50 mcg, 50 mcg, Oral, Q AM, Nishant Allan PA, 50 mcg at 08/14/22 0508    metoprolol succinate XL (TOPROL-XL) 24 hr tablet 50 mg, 50 mg, Oral, Q24H, Nishant Allan PA, 50 mg at 08/14/22 0803    nitroglycerin (NITROSTAT) SL tablet 0.4 mg, 0.4 mg, Sublingual, Q5 Min PRN, Reilly Thomas MD, 0.4 mg at 08/13/22 2206    ondansetron (ZOFRAN) tablet 4 mg, 4 mg, Oral, Q6H PRN **OR** ondansetron (ZOFRAN) injection 4 mg, 4 mg, Intravenous, Q6H PRN, Nishant Allan PA, 4 mg at 08/10/22 1607    Pharmacy to Dose Heparin, , Does not apply, Continuous PRN, Reilly Thomas MD    phenol (CHLORASEPTIC) 1.4 % liquid 1 spray, 1 spray, Mouth/Throat, Q2H PRN, Megan Shukla PA, 1 spray at 08/04/22 1610    polyethylene glycol (MIRALAX) packet 17 g, 17 g, Oral, Daily, Rajani Farr DO, 17 g at 08/14/22 0802    prochlorperazine (COMPAZINE) injection 5 mg, 5 mg, Intravenous, Q6H PRN, Rajani Farr DO, 5 mg at 08/13/22 1955    sennosides-docusate (PERICOLACE) 8.6-50 MG per tablet 2 tablet, 2 tablet, Oral, BID PRN, Nishant Allan PA, 2 tablet at 08/09/22 1402    sennosides-docusate (PERICOLACE) 8.6-50 MG per tablet 2 tablet, 2 tablet, Oral, BID, Reilly Thomas MD, 2 tablet at  08/14/22 0803    [COMPLETED] Insert peripheral IV, , , Once **AND** sodium chloride 0.9 % flush 10 mL, 10 mL, Intravenous, PRN, Nishant Allan PA    sodium chloride 0.9 % flush 10 mL, 10 mL, Intravenous, Q12H, Nishant Allan PA, 10 mL at 08/14/22 0803    sodium chloride 0.9 % flush 10 mL, 10 mL, Intravenous, PRN, Nishant Allan PA    sodium chloride 0.9 % flush 10 mL, 10 mL, Intravenous, Q12H, Rajani Farr R, DO, 10 mL at 08/14/22 0803    sodium chloride 0.9 % flush 10 mL, 10 mL, Intravenous, PRN, Rajani Farr, DO, 10 mL at 08/10/22 1416    vitamin D3 capsule 5,000 Units, 5,000 Units, Oral, Daily, Nishant Allan PA, 5,000 Units at 08/14/22 0802    Facility-Administered Medications Ordered in Other Encounters:     Chlorhexidine Gluconate Cloth 2 % pads 1 application, 1 application, Topical, Q12H PRN, Mohan Arauz PA    Antibiotics:  Anti-Infectives (From admission, onward)      Ordered     Dose/Rate Route Frequency Start Stop    08/08/22 1445  ampicillin-sulbactam 3 g/100 mL 0.9% NS IVPB        Ordering Provider: Sage Braga MD    3 g Intravenous Every 8 Hours 08/08/22 1600 08/15/22 1559    08/04/22 1550  cefepime (MAXIPIME) 2 g/100 mL 0.9% NS (mbp)        Ordering Provider: Sage Braga MD    2 g  200 mL/hr over 30 Minutes Intravenous Once 08/04/22 1645 08/04/22 1640    08/02/22 1111  vancomycin 1500 mg/500 mL 0.9% NS IVPB ([facility])        Ordering Provider: Nishant Allan PA    15 mg/kg × 98 kg Intravenous Once 08/02/22 2200 08/02/22 2130    08/01/22 1209  vancomycin in dextrose 5% 150 mL (VANCOCIN) IVPB 750 mg        Ordering Provider: Miguel Bray RPH    750 mg Intravenous Every 12 Hours 08/01/22 2200 08/02/22 0934    08/01/22 1209  vancomycin in dextrose 5% 150 mL (VANCOCIN) IVPB 750 mg        Ordering Provider: Miguel Bray RPH    750 mg  over 60 Minutes Intravenous Every 12 Hours 08/01/22 1300 08/01/22 1422    07/28/22 0014  piperacillin-tazobactam (ZOSYN)  3.375 g in iso-osmotic dextrose 50 ml (premix)        Ordering Provider: Eliana Hightower APRN    3.375 g  over 4 Hours Intravenous Every 8 Hours 22 0800 22 0303    22  piperacillin-tazobactam (ZOSYN) 3.375 g in iso-osmotic dextrose 50 ml (premix)        Ordering Provider: Donny Hightower APRN    3.375 g  over 30 Minutes Intravenous Once 22 22122 0320    22  vancomycin 2000 mg/500 mL 0.9% NS IVPB ([facility])        Ordering Provider: Donny Hightower APRN    20 mg/kg × 98 kg Intravenous Once 22 0040              Review of Systems:  See HPI      Physical Exam:   Vital Signs  Temp (24hrs), Av.8 °F (36.6 °C), Min:97.6 °F (36.4 °C), Max:97.9 °F (36.6 °C)    Temp  Min: 97.6 °F (36.4 °C)  Max: 97.9 °F (36.6 °C)  BP  Min: 99/63  Max: 172/72  Pulse  Min: 68  Max: 85  Resp  Min: 16  Max: 22  SpO2  Min: 90 %  Max: 95 %    GENERAL: resting quietly  in no acute distress.   HEENT: Normocephalic, atraumatic.  PERRL. EOMI. No conjunctival injection. No icterus.   HEART: RRR; No murmur,  LUNGS: Clear to auscultation bilaterally   ABDOMEN: Soft, nontender,   :  Without Fonseca catheter.  MSK: Left foot wrapped  SKIN: Warm and dry without cutaneous eruptions on Inspection/palpation.        Laboratory Data    Results from last 7 days   Lab Units 22  2310 22  1224 22  0941   WBC 10*3/mm3 14.07* 14.42* 13.76*   HEMOGLOBIN g/dL 10.2* 10.5* 11.2*   HEMATOCRIT % 31.2* 31.4* 35.5*   PLATELETS 10*3/mm3 226 209 236     Results from last 7 days   Lab Units 22  2310   SODIUM mmol/L 139   POTASSIUM mmol/L 4.0   CHLORIDE mmol/L 99   CO2 mmol/L 27.0   BUN mg/dL 51*   CREATININE mg/dL 2.07*   GLUCOSE mg/dL 151*   CALCIUM mg/dL 8.5*     Results from last 7 days   Lab Units 22  2310   ALK PHOS U/L 60   BILIRUBIN mg/dL 0.3   ALT (SGPT) U/L 13   AST (SGOT) U/L 12             Results from last 7 days   Lab Units 22  0636   LACTATE mmol/L 1.2              Estimated Creatinine Clearance: 41.4 mL/min (A) (by C-G formula based on SCr of 2.07 mg/dL (H)).      Microbiology:  Microbiology Results (last 10 days)       ** No results found for the last 240 hours. **                  Radiology:  Imaging Results (Last 72 Hours)       Procedure Component Value Units Date/Time    XR Chest 1 View [[id number]] Collected: 08/14/22 0140     Updated: 08/14/22 0145    Narrative:      Single portable chest radiograph    INDICATION:  Shortness of breath    COMPARISON:  Chest radiograph 8/12/2022    FINDINGS:    Right-sided PICC line terminates in the cavoatrial junction. Cardiac pacemaker.    Pulmonary vascular congestion. Increased patchy bibasilar opacities. Pleural effusion, or pneumothorax.    The cardiomediastinal silhouette is mildly enlarged as before. Mediastinal clips. Cardiac pacemaker.    No acute focal bony abnormalities are seen. Median sternotomy.      Impression:        Increased patchy bibasilar opacities    Cardiomegaly and pulmonary vascular congestion.    Electronically signed by:  Melissa Flores M.D.    8/13/2022 11:44 PM Mountain Time    NM Lung Scan Perfusion Particulate [[id number]] Collected: 08/12/22 1724     Updated: 08/12/22 2253    Narrative:      DATE OF EXAM: 8/12/2022 3:16 PM     PROCEDURE: NM LUNG SCAN PERFUSION PARTICULATE-     INDICATIONS: acute respiratory failure, r/o PE; S43.005A-Unspecified  dislocation of left shoulder joint, initial encounter;  L03.116-Cellulitis of left lower limb; Z78.9-Other specified health  status     COMPARISON: Portable chest radiograph of same day     TECHNIQUE: 5.5 mCi of technetium 99m labeled MAA was administered  intravenously for the perfusion portion of the pulmonary exam.  Scintigraphic images of the lungs were obtained in multiple projections  to assess perfusion.     FINDINGS:   The perfusion pattern appears heterogeneous, which would likely be  explained by the patient's pulmonary interstitial edema. No  well-defined  large or small segmental or subsegmental defects are identified. Study  is considered low probability for pulmonary embolus.        Impression:      Heterogeneous perfusion scan, which likely reflects the patient's  pulmonary edema seen on concurrent chest radiograph. Study is considered  low probability for pulmonary embolic disease.     This report was finalized on 8/12/2022 10:50 PM by Dr. Leodan Nelson MD.       CT Chest Without Contrast Diagnostic [[id number]] Collected: 08/12/22 1803     Updated: 08/12/22 1809    Narrative:      DATE OF EXAM: 8/12/2022 3:25 PM     PROCEDURE: CT CHEST WO CONTRAST DIAGNOSTIC-     INDICATIONS: respiratory failure, high oxygen requirement;  S43.005A-Unspecified dislocation of left shoulder joint, initial  encounter; L03.116-Cellulitis of left lower limb; Z78.9-Other specified  health status     COMPARISON: No comparisons available.     TECHNIQUE: Routine transaxial slices were obtained through the chest  without the administration of intravenous contrast. Reconstructed  coronal and sagittal images were also obtained. Automated exposure  control and iterative construction methods were used.     The radiation dose reduction device was turned on for each scan per the  ALARA (As Low as Reasonably Achievable) protocol.     FINDINGS:  There is no pathologic axillary adenopathy or other worrisome body wall  soft tissue finding in the chest. No acute findings are present in the  partially characterized upper abdomen. There are small bilateral pleural  effusions. There is no pericardial effusion. There is no distinct  pathologic mediastinal adenopathy. Mildly atherosclerotic, nonaneurysmal  thoracic aorta. Evaluation of the osseous structures demonstrates no  evidence of acute fracture or or aggressive osseous lesion, with  multilevel thoracic spondylosis change present. Evaluation of the lung  fields demonstrates interlobular septal thickening, with some  intervening groundglass  opacity, suggesting prominent component of  pulmonary edema. There is some involved fairly symmetric consolidation  present in the lower lobes, without additional specific evidence of  superimposed pneumonia. Nonspecific grouped pulmonary nodules are  present anteriorly at the right lung base, with a discrete nodule  present measuring 10 mm.        Impression:      Appearance most consistent with pulmonary edema, including  small-to-moderate bilateral pleural effusions. Nonspecific pulmonary  nodules are present anteriorly near the right lung base, measuring up to  10 mm. Consider nonemergent follow-up CT chest without acute process has  resolved.     This report was finalized on 8/12/2022 6:05 PM by Jermaine Wang.       XR Chest 1 View [[id number]] Collected: 08/12/22 0903     Updated: 08/12/22 0907    Narrative:      DATE OF EXAM: 8/12/2022 8:35 AM     PROCEDURE: XR CHEST 1 VW-     INDICATIONS: chest pain; S43.005A-Unspecified dislocation of left  shoulder joint, initial encounter; L03.116-Cellulitis of left lower  limb; Z78.9-Other specified health status     COMPARISON: 8/10/2022     TECHNIQUE: Single radiographic AP view of the chest was obtained.     FINDINGS:  Cardiac size is enlarged postoperative changes of CABG. A left-sided  transvenous pacemaker is in good position. The right internal jugular  line has been removed. A right-sided PICC line terminates in the SVC.  Left-sided transvenous pacemaker remains in appropriate position. There  is mild passive congestion present. There is improving aeration in the  lung bases compared with the last study.        Impression:      Cardiomegaly with evidence of at least mild passive congestion likely  early interstitial edema. Findings are suggestive of at least mild heart  failure. There is a decrease in atelectasis in the lung bases compared  with the last study.     This report was finalized on 8/12/2022 9:04 AM by Donny Cordoba MD.       XR Abdomen KUB  [[id number]] Collected: 22 1515     Updated: 22 1521    Narrative:      Examination: XR ABDOMEN KUB-     Date of Exam: 2022 1:50 PM     Indication: following constipation, distended abd; S43.005A-Unspecified  dislocation of left shoulder joint, initial encounter;  L03.116-Cellulitis of left lower limb; Z78.9-Other specified health  status.     Comparison: 2022.     Technique: Single AP view of the abdomen was obtained radiographically.     Findings:  There appears to be a decreased pedal burden with normal colonic gas and  stool at this time. There are phleboliths in the pelvis. There is  evidence of prior median sternotomy. There are mild degenerative changes  of both hips. There is no bowel distention. No pneumatosis intestinalis  or evidence of pneumoperitoneum. No pathologic abdominal calcifications.  Regional bones appear intact. No evidence of organomegaly or large  volume ascites.       Impression:      Normal colonic stool burden. No bowel distention.      This report was finalized on 2022 3:18 PM by Navneet Amin MD.             Estimated Creatinine Clearance: 41.4 mL/min (A) (by C-G formula based on SCr of 2.07 mg/dL (H)).      Impression:   Leukocytosis with neutrophilia, ongoing.  Lactic acidosis  Left great toe wound- osteomyelitis by bone scan- amputation recommended by Dr. Márquez   Status post fall  Diabetes mellitus type 2  Left shoulder dislocation  Probable peripheral vascular disease  Acute renal failure  Elevated proBNP/difficulty diuresis given renal failure.    PLAN/RECOMMENDATIONS:     cont unasyn 3 g iv [id number]--[id number] to , will renew.  Defer CHF management to cardiology     Cont iv abx x 6 weeks form 22 (22)  Patient has improvement  D/w family    picc placed  Anticipate placement at LTAC    Sage Braga MD saw and examined patient, verified hx and PE reviewed labs and micro data, and formulated dx, plan for treatment and all medical decision making.       RANULFO Carlin-CHARLES for Sage Braga MD  I have seen and examined patient agree with above continue IV Unasyn  RANULFO Carlin  8/14/2022  12:03 EDT

## 2022-08-15 ENCOUNTER — APPOINTMENT (OUTPATIENT)
Dept: CARDIOLOGY | Facility: HOSPITAL | Age: 77
End: 2022-08-15

## 2022-08-15 ENCOUNTER — APPOINTMENT (OUTPATIENT)
Dept: ULTRASOUND IMAGING | Facility: HOSPITAL | Age: 77
End: 2022-08-15

## 2022-08-15 PROBLEM — I50.21 ACUTE SYSTOLIC HEART FAILURE: Status: ACTIVE | Noted: 2022-08-15

## 2022-08-15 PROBLEM — I82.403 DVT, BILATERAL LOWER LIMBS: Status: ACTIVE | Noted: 2022-08-15

## 2022-08-15 PROBLEM — J96.01 ACUTE RESPIRATORY FAILURE WITH HYPOXIA: Status: ACTIVE | Noted: 2022-08-15

## 2022-08-15 PROBLEM — N17.9 ACUTE RENAL FAILURE (ARF): Status: ACTIVE | Noted: 2022-08-15

## 2022-08-15 LAB
ALBUMIN SERPL-MCNC: 3.6 G/DL (ref 3.5–5.2)
ANION GAP SERPL CALCULATED.3IONS-SCNC: 14 MMOL/L (ref 5–15)
BUN SERPL-MCNC: 64 MG/DL (ref 8–23)
BUN/CREAT SERPL: 29.6 (ref 7–25)
C3 SERPL-MCNC: 137 MG/DL (ref 82–167)
C4 SERPL-MCNC: 34 MG/DL (ref 14–44)
CALCIUM SPEC-SCNC: 8.8 MG/DL (ref 8.6–10.5)
CHLORIDE SERPL-SCNC: 97 MMOL/L (ref 98–107)
CK SERPL-CCNC: 171 U/L (ref 20–200)
CO2 SERPL-SCNC: 27 MMOL/L (ref 22–29)
CREAT SERPL-MCNC: 2.16 MG/DL (ref 0.76–1.27)
CREAT UR-MCNC: 149.9 MG/DL
DEPRECATED RDW RBC AUTO: 42.1 FL (ref 37–54)
EGFRCR SERPLBLD CKD-EPI 2021: 30.8 ML/MIN/1.73
ERYTHROCYTE [DISTWIDTH] IN BLOOD BY AUTOMATED COUNT: 12.7 % (ref 12.3–15.4)
GLUCOSE BLDC GLUCOMTR-MCNC: 126 MG/DL (ref 70–130)
GLUCOSE BLDC GLUCOMTR-MCNC: 203 MG/DL (ref 70–130)
GLUCOSE BLDC GLUCOMTR-MCNC: 213 MG/DL (ref 70–130)
GLUCOSE BLDC GLUCOMTR-MCNC: 60 MG/DL (ref 70–130)
GLUCOSE BLDC GLUCOMTR-MCNC: 85 MG/DL (ref 70–130)
GLUCOSE SERPL-MCNC: 225 MG/DL (ref 65–99)
HCT VFR BLD AUTO: 32.6 % (ref 37.5–51)
HGB BLD-MCNC: 10.6 G/DL (ref 13–17.7)
MCH RBC QN AUTO: 29.3 PG (ref 26.6–33)
MCHC RBC AUTO-ENTMCNC: 32.5 G/DL (ref 31.5–35.7)
MCV RBC AUTO: 90.1 FL (ref 79–97)
PHOSPHATE SERPL-MCNC: 5.5 MG/DL (ref 2.5–4.5)
PLATELET # BLD AUTO: 199 10*3/MM3 (ref 140–450)
PMV BLD AUTO: 12.3 FL (ref 6–12)
POTASSIUM SERPL-SCNC: 4.9 MMOL/L (ref 3.5–5.2)
QT INTERVAL: 442 MS
QT INTERVAL: 456 MS
QT INTERVAL: 482 MS
QTC INTERVAL: 534 MS
QTC INTERVAL: 538 MS
QTC INTERVAL: 551 MS
RBC # BLD AUTO: 3.62 10*6/MM3 (ref 4.14–5.8)
SODIUM SERPL-SCNC: 138 MMOL/L (ref 136–145)
WBC NRBC COR # BLD: 9.63 10*3/MM3 (ref 3.4–10.8)

## 2022-08-15 PROCEDURE — 82550 ASSAY OF CK (CPK): CPT | Performed by: INTERNAL MEDICINE

## 2022-08-15 PROCEDURE — 99233 SBSQ HOSP IP/OBS HIGH 50: CPT | Performed by: INTERNAL MEDICINE

## 2022-08-15 PROCEDURE — 63710000001 INSULIN LISPRO (HUMAN) PER 5 UNITS: Performed by: INTERNAL MEDICINE

## 2022-08-15 PROCEDURE — 63710000001 INSULIN DETEMIR PER 5 UNITS: Performed by: INTERNAL MEDICINE

## 2022-08-15 PROCEDURE — 94799 UNLISTED PULMONARY SVC/PX: CPT

## 2022-08-15 PROCEDURE — 76775 US EXAM ABDO BACK WALL LIM: CPT

## 2022-08-15 PROCEDURE — 85027 COMPLETE CBC AUTOMATED: CPT | Performed by: INTERNAL MEDICINE

## 2022-08-15 PROCEDURE — 86038 ANTINUCLEAR ANTIBODIES: CPT | Performed by: INTERNAL MEDICINE

## 2022-08-15 PROCEDURE — 97110 THERAPEUTIC EXERCISES: CPT | Performed by: OCCUPATIONAL THERAPIST

## 2022-08-15 PROCEDURE — 94664 DEMO&/EVAL PT USE INHALER: CPT

## 2022-08-15 PROCEDURE — 86160 COMPLEMENT ANTIGEN: CPT | Performed by: INTERNAL MEDICINE

## 2022-08-15 PROCEDURE — 99024 POSTOP FOLLOW-UP VISIT: CPT | Performed by: THORACIC SURGERY (CARDIOTHORACIC VASCULAR SURGERY)

## 2022-08-15 PROCEDURE — 25010000002 AMPICILLIN-SULBACTAM PER 1.5 G: Performed by: PHYSICIAN ASSISTANT

## 2022-08-15 PROCEDURE — 82962 GLUCOSE BLOOD TEST: CPT

## 2022-08-15 PROCEDURE — 80069 RENAL FUNCTION PANEL: CPT | Performed by: INTERNAL MEDICINE

## 2022-08-15 RX ORDER — SIMETHICONE 80 MG
80 TABLET,CHEWABLE ORAL ONCE
Status: COMPLETED | OUTPATIENT
Start: 2022-08-16 | End: 2022-08-16

## 2022-08-15 RX ORDER — INSULIN LISPRO 100 [IU]/ML
0-14 INJECTION, SOLUTION INTRAVENOUS; SUBCUTANEOUS
Status: DISCONTINUED | OUTPATIENT
Start: 2022-08-15 | End: 2022-08-16

## 2022-08-15 RX ORDER — SIMETHICONE 80 MG
80 TABLET,CHEWABLE ORAL 4 TIMES DAILY PRN
Status: DISCONTINUED | OUTPATIENT
Start: 2022-08-15 | End: 2022-08-26 | Stop reason: HOSPADM

## 2022-08-15 RX ADMIN — IPRATROPIUM BROMIDE AND ALBUTEROL SULFATE 3 ML: .5; 3 SOLUTION RESPIRATORY (INHALATION) at 20:45

## 2022-08-15 RX ADMIN — METOPROLOL SUCCINATE 50 MG: 50 TABLET, EXTENDED RELEASE ORAL at 08:49

## 2022-08-15 RX ADMIN — APIXABAN 10 MG: 5 TABLET, FILM COATED ORAL at 21:04

## 2022-08-15 RX ADMIN — IPRATROPIUM BROMIDE AND ALBUTEROL SULFATE 3 ML: .5; 3 SOLUTION RESPIRATORY (INHALATION) at 03:41

## 2022-08-15 RX ADMIN — APIXABAN 10 MG: 5 TABLET, FILM COATED ORAL at 08:48

## 2022-08-15 RX ADMIN — IPRATROPIUM BROMIDE AND ALBUTEROL SULFATE 3 ML: .5; 3 SOLUTION RESPIRATORY (INHALATION) at 23:25

## 2022-08-15 RX ADMIN — AMLODIPINE BESYLATE 10 MG: 10 TABLET ORAL at 08:49

## 2022-08-15 RX ADMIN — CETIRIZINE HYDROCHLORIDE 5 MG: 10 TABLET, FILM COATED ORAL at 08:49

## 2022-08-15 RX ADMIN — LEVOTHYROXINE SODIUM 50 MCG: 50 TABLET ORAL at 05:30

## 2022-08-15 RX ADMIN — IPRATROPIUM BROMIDE AND ALBUTEROL SULFATE 3 ML: .5; 3 SOLUTION RESPIRATORY (INHALATION) at 00:13

## 2022-08-15 RX ADMIN — SODIUM CHLORIDE 3 G: 900 INJECTION INTRAVENOUS at 00:01

## 2022-08-15 RX ADMIN — POLYETHYLENE GLYCOL 3350 17 G: 17 POWDER, FOR SOLUTION ORAL at 08:49

## 2022-08-15 RX ADMIN — HYDROCODONE BITARTRATE AND ACETAMINOPHEN 1 TABLET: 5; 325 TABLET ORAL at 07:33

## 2022-08-15 RX ADMIN — ASPIRIN 81 MG CHEWABLE TABLET 81 MG: 81 TABLET CHEWABLE at 08:48

## 2022-08-15 RX ADMIN — INSULIN LISPRO 5 UNITS: 100 INJECTION, SOLUTION INTRAVENOUS; SUBCUTANEOUS at 08:48

## 2022-08-15 RX ADMIN — Medication 5000 UNITS: at 08:48

## 2022-08-15 RX ADMIN — SODIUM CHLORIDE 3 G: 900 INJECTION INTRAVENOUS at 15:12

## 2022-08-15 RX ADMIN — SENNOSIDES AND DOCUSATE SODIUM 2 TABLET: 50; 8.6 TABLET ORAL at 21:04

## 2022-08-15 RX ADMIN — SODIUM CHLORIDE 3 G: 900 INJECTION INTRAVENOUS at 08:44

## 2022-08-15 RX ADMIN — SENNOSIDES AND DOCUSATE SODIUM 2 TABLET: 50; 8.6 TABLET ORAL at 08:48

## 2022-08-15 RX ADMIN — Medication 10 ML: at 21:05

## 2022-08-15 RX ADMIN — INSULIN DETEMIR 30 UNITS: 100 INJECTION, SOLUTION SUBCUTANEOUS at 21:04

## 2022-08-15 RX ADMIN — IPRATROPIUM BROMIDE AND ALBUTEROL SULFATE 3 ML: .5; 3 SOLUTION RESPIRATORY (INHALATION) at 16:18

## 2022-08-15 RX ADMIN — Medication 10 ML: at 09:00

## 2022-08-15 RX ADMIN — INSULIN LISPRO 5 UNITS: 100 INJECTION, SOLUTION INTRAVENOUS; SUBCUTANEOUS at 13:05

## 2022-08-15 RX ADMIN — Medication 10 ML: at 10:00

## 2022-08-15 NOTE — PROGRESS NOTES
"  [facility]  PROGRESS NOTE    Date of Admission: 7/27/2022  Date of Service: 08/15/22    Primary Care Physician: Farooq Painter MD    Chief Complaint: Following for HTN, Acute CHF    Subjective      Awake in bed, daughter at bedside.  Overall had stable weekend.  Feels his breathing continues to be labored.  No pain today.      Objective   Vitals: /79   Pulse 77   Temp 97.6 °F (36.4 °C) (Oral)   Resp 18   Ht 190.5 cm (75\")   Wt 98 kg (216 lb 0.8 oz)   SpO2 90%   BMI 27.00 kg/m²     Physical Exam:  GENERAL: Alert, cooperative, in no acute distress.   HEENT: Normocephalic, no jugular venous distention  HEART: Regular rhythm, normal rate, and no murmurs, gallops, or rubs.   LUNGS: Audible wheezing throughout all lung fields with coarse lung sounds, On 5L NC.   ABDOMEN: Soft, bowel sounds present, nontender   NEUROLOGIC: No focal abnormalities involving strength or sensation are noted.   EXTREMITIES: No clubbing, cyanosis, or edema noted.     Results:  Results from last 7 days   Lab Units 08/15/22  0615 08/13/22  2310 08/13/22  1224   WBC 10*3/mm3 9.63 14.07* 14.42*   HEMOGLOBIN g/dL 10.6* 10.2* 10.5*   HEMATOCRIT % 32.6* 31.2* 31.4*   PLATELETS 10*3/mm3 199 226 209     Results from last 7 days   Lab Units 08/15/22  0615 08/13/22  2310 08/13/22  0730   SODIUM mmol/L 138 139 143   POTASSIUM mmol/L 4.9 4.0 3.8   CHLORIDE mmol/L 97* 99 104   CO2 mmol/L 27.0 27.0 28.0   BUN mg/dL 64* 51* 51*   CREATININE mg/dL 2.16* 2.07* 1.94*   GLUCOSE mg/dL 225* 151* 63*      Lab Results   Component Value Date    CHOL 180 08/04/2022    TRIG 160 (H) 08/04/2022    HDL 39 (L) 08/04/2022     (H) 08/04/2022    AST 12 08/13/2022    ALT 13 08/13/2022         Results from last 7 days   Lab Units 08/13/22  1224 08/11/22  0023   PROTIME Seconds 13.8 13.6   INR  1.07 1.05   APTT seconds 29.7* 33.1*     Results from last 7 days   Lab Units 08/15/22  0615 08/13/22  2310 08/13/22  1224 " 08/13/22  0730   CK TOTAL U/L 171  --   --   --    TROPONIN T ng/mL  --  0.230* 0.229* 0.230*     Results from last 7 days   Lab Units 08/13/22  2310   PROBNP pg/mL 31,919.0*       Intake/Output Summary (Last 24 hours) at 8/15/2022 0947  Last data filed at 8/15/2022 0600  Gross per 24 hour   Intake 666.33 ml   Output 325 ml   Net 341.33 ml       I personally reviewed the patient's EKG/Telemetry data    Radiology Data:   CXR 8/14/22:  IMPRESSION:     Increased patchy bibasilar opacities     Cardiomegaly and pulmonary vascular congestion.    Current Medications:  amLODIPine, 10 mg, Oral, Daily  ampicillin-sulbactam, 3 g, Intravenous, Q8H  apixaban, 10 mg, Oral, Q12H   Followed by  [START ON 8/21/2022] apixaban, 5 mg, Oral, Q12H  aspirin, 81 mg, Oral, Daily  cetirizine, 5 mg, Oral, Daily  insulin detemir, 30 Units, Subcutaneous, Nightly  insulin lispro, 0-14 Units, Subcutaneous, TID AC  levothyroxine, 50 mcg, Oral, Q AM  metoprolol succinate XL, 50 mg, Oral, Q24H  polyethylene glycol, 17 g, Oral, Daily  senna-docusate sodium, 2 tablet, Oral, BID  [START ON 8/16/2022] simethicone, 80 mg, Oral, Once  sodium chloride, 10 mL, Intravenous, Q12H  sodium chloride, 10 mL, Intravenous, Q12H  vitamin D3, 5,000 Units, Oral, Daily               ASSESSMENT:  PVDz with remote Soy iliac stenting, good collateral flow.  Osteomyelitis left Great toe  CAD  HTN  [place]  Acute CHF  Hypoxia  DMII  BLE DVTs      PLAN:  POD 13 p Left grt toe transmetatarsal amputation.  Continue PT/OT, antibiotics per ID.  Mildly elevated troponin from demand ischemia with acute CHF. No acute EKG changes.  Continue Beta Blocker, CCB.  Repeat echo, EF likely closer to 40%.  Continued rise in Cr.  Patient needs diuresis, maybe even CRRT. Nephrology managing diuretics.  Heparin transitioned to Eliquis per DVT protocol.      Megan Shukla PA-C working with Dr. Cooper Apodaca  08/15/2022  10:00 AM EDT

## 2022-08-15 NOTE — PROGRESS NOTES
Patient is on Apixaban.  Education provided to patient and family member at bedside on 8/15 verbally and in writing. Information provided includes effects of medication, drug-drug and drug-food interactions, and signs/symptoms of bleeding and clotting.  Patient verbalized understanding through teach back.  All pertinent questions were answered.

## 2022-08-15 NOTE — PLAN OF CARE
Goal Outcome Evaluation:  Plan of Care Reviewed With: patient, spouse        Progress: no change  Outcome Evaluation: Pt continues to declined OOB activity including up to chair via lift, due to c/o fatigue and dyspnea. Pt on 4.5L NC during session and dyspnea noted with conversation. He declined simple grooming tasks supine level. He participated in BUE AA/AROM HEP. Will decrease frequency of 3x/wk if participation remains limited. Recommend SNF.

## 2022-08-15 NOTE — THERAPY TREATMENT NOTE
Patient Name: Jermaine Singh  : 1945    MRN: [id number]                              Today's Date: 8/15/2022       Admit Date: 2022    Visit Dx:     ICD-10-CM ICD-9-CM   1. Dislocation of left shoulder joint, initial encounter  S43.005A 831.00   2. Cellulitis of left foot  L03.116 682.7   3. 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  Preserved LV function   • Type 2 diabetes mellitus (HCC)   • Hypertension   • Hyperlipidemia   • Hypothyroidism (acquired)   • Vitamin D deficiency on Rx    • Serum Cr 1.32 on admission.  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 Au   • Dizziness   • Atherosclerosis of native artery of both lower extremities with intermittent claudication ([facility])   • SSS (sick sinus syndrome) ([facility])   • Dislocation of left shoulder joint, initial encounter   • Cellulitis in diabetic foot ([facility])   • Fall   • Sepsis ([facility])   • Lactic acidosis   • Proximal phalanx fracture of the second digit extending into the second metatarsal joint   • Acute renal failure (ARF) ([facility])   • Acute systolic heart failure ([facility])   • DVT, bilateral lower limbs ([facility])   • Acute respiratory failure with hypoxia ([facility])     Past Medical History:   Diagnosis Date   • Asthma    • Coronary artery disease    • Diabetes mellitus (HCC)     started on [place] 2018; started on po meds in ; checking blood sugars daily    • Disease of thyroid gland     po meds daily for hypothyroidism    • History of fracture as a child     rt leg- severe    • Hyperlipidemia    • Hypertension    • Hypothyroidism    • Peripheral neuropathy    • Peripheral vascular disease (HCC)     s/p angiogram -needs stent in left leg    • RBBB    • Right knee pain    • Vitamin D deficiency      Past Surgical History:   Procedure Laterality Date   • APPENDECTOMY     • CARDIAC  CATHETERIZATION N/A 2/14/2019    Procedure: Left Heart Cath;  Surgeon: Cooper Apodaca MD;  Location:  [facility] CATH INVASIVE LOCATION;  Service: Cardiology   • CARDIAC ELECTROPHYSIOLOGY PROCEDURE N/A 5/18/2022    Procedure: DEVICE IMPLANT;  Surgeon: Cooper Apodaca MD;  Location:  [facility] CATH INVASIVE LOCATION;  Service: Cardiology;  Laterality: N/A;   • COLONOSCOPY     • CORONARY ARTERY BYPASS GRAFT N/A 3/22/2019    Procedure: MEDIAN STERNOTOMY, CORONARY ARTERY BYPASS GRAFT X3, UTILIZING THE LEFT INTERNAL MAMMARY ARTERY, EVH AND OPEN HARVEST OF THE RIGHT GREATER SAPHENOUS VEIN, EXPLORATION OF THE LEFT LEG;  Surgeon: Ap Au MD;  Location:  [facility];  Service: Cardiothoracic   • EYE SURGERY Bilateral     cataracts    • INTERVENTIONAL RADIOLOGY PROCEDURE N/A 7/29/2021    Procedure: Abdominal Aortagram with Runoff;  Surgeon: Jermaine Hernandez MD;  Location:  [facility] CATH INVASIVE LOCATION;  Service: Cardiovascular;  Laterality: N/A;   • KNEE ARTHROSCOPY      right x 2, left x 1   • LACERATION REPAIR      right leg   • LEG SURGERY      2 for fracture of rt leg    • TONSILLECTOMY      Adnoidectomy   • TRANS METATARSAL AMPUTATION Left 8/2/2022    Procedure: GREAT TOE AMPUTATION LEFT;  Surgeon: Gopal Márquez MD;  Location:  [facility];  Service: Vascular;  Laterality: Left;      General Information     Row Name 08/15/22 0939          OT Time and Intention    Document Type progress note/recertification  -AR     Mode of Treatment occupational therapy;individual therapy  -AR     Row Name 08/15/22 0939          General Information    Existing Precautions/Restrictions fall;left;non-weight bearing;other (see comments);oxygen therapy device and L/min  WBAT LUE- no shoulder ED/ABD, NWB LLE  -AR     Row Name 08/15/22 0939          Cognition    Orientation Status (Cognition) oriented x 4  -AR     Row Name 08/15/22 0939          Safety Issues, Functional Mobility    Safety Issues Affecting Function (Mobility)  judgment  -AR     Impairments Affecting Function (Mobility) endurance/activity tolerance;pain;range of motion (ROM);strength;shortness of breath  -AR           User Key  (r) = Recorded By, (t) = Taken By, (c) = Cosigned By    Initials Name Provider Type    Nathalia Nava OT Occupational Therapist                 Mobility/ADL's     Row Name 08/15/22 0943          Bed Mobility    Comment, (Bed Mobility) pt declined EOB or OOB activity d/t fatigue and dyspnea  -AR     Row Name 08/15/22 0943          Transfers    Comment, (Transfers) Pt declined any transfers including getting up to chair via lift  -AR     Row Name 08/15/22 0943          Activities of Daily Living    BADL Assessment/Intervention grooming  -AR     Row Name 08/15/22 0943          Mobility    Extremity Weight-bearing Status left upper extremity;left lower extremity  -AR     Left Upper Extremity (Weight-bearing Status) weight-bearing as tolerated (WBAT)  -AR     Left Lower Extremity (Weight-bearing Status) non weight-bearing (NWB)   -AR     Row Name 08/15/22 0943          Grooming Assessment/Training    Comment, (Grooming) pt declined washing face or oral care  -AR           User Key  (r) = Recorded By, (t) = Taken By, (c) = Cosigned By    Initials Name Provider Type    Nathalia Nava OT Occupational Therapist               Obj/Interventions     Row Name 08/15/22 0946          Range of Motion Comprehensive    Comment, General Range of Motion RUE AAROM WFL, L shoulder FE tolerated to 90, IR chest, elbow/wrist/hand WFL. Deferred extension, ABD and ER.  -AR     Row Name 08/15/22 0946          Strength Comprehensive (MMT)    Comment, General Manual Muscle Testing (MMT) Assessment R shoulder FE 2+/5, elbow/wrist/hand 3/5; LUE deferred  -AR     Row Name 08/15/22 0946          Shoulder (Therapeutic Exercise)    Shoulder AAROM (Therapeutic Exercise) bilateral;flexion;internal rotation;right;aBduction;aDduction;external rotation;horizontal  aBduction/aDduction;supine;5 repetitions;10 repetitions  -AR     Row Name 08/15/22 0946          Elbow/Forearm (Therapeutic Exercise)    Elbow/Forearm (Therapeutic Exercise) AROM (active range of motion)  -AR     Elbow/Forearm AROM (Therapeutic Exercise) bilateral;flexion;extension;supination;pronation;15 repititions;supine  -AR     Row Name 08/15/22 0946          Wrist (Therapeutic Exercise)    Wrist (Therapeutic Exercise) AROM (active range of motion)  -AR     Wrist AROM (Therapeutic Exercise) flexion;extension;10 repetitions;bilateral  -AR     Row Name 08/15/22 0946          Hand (Therapeutic Exercise)    Hand (Therapeutic Exercise) AROM (active range of motion)  -AR     Hand AROM/AAROM (Therapeutic Exercise) bilateral;finger flexion;finger extension;10 repetitions  -AR     Row Name 08/15/22 0946          Motor Skills    Therapeutic Exercise shoulder;elbow/forearm;wrist;hand  -AR           User Key  (r) = Recorded By, (t) = Taken By, (c) = Cosigned By    Initials Name Provider Type    Nathalia Nava, OT Occupational Therapist               Goals/Plan     Row Name 08/15/22 0957          Transfer Goal 1 (OT)    Activity/Assistive Device (Transfer Goal 1, OT) sit-to-stand/stand-to-sit;commode, bedside with drop arms;walker, rolling  -AR     [place] Level/Cues Needed (Transfer Goal 1, OT) maximum assist (25-49% patient effort);verbal cues required  -AR     Time Frame (Transfer Goal 1, OT) long term goal (LTG);by discharge  -AR     Progress/Outcome (Transfer Goal 1, OT) goal revised this date  -AR     Row Name 08/15/22 0957          Dressing Goal 1 (OT)    Activity/Device (Dressing Goal 1, OT) upper body dressing  -AR     [place]/Cues Needed (Dressing Goal 1, OT) moderate assist (50-74% patient effort)  -AR     Time Frame (Dressing Goal 1, OT) long term goal (LTG);by discharge  -AR     Progress/Outcome (Dressing Goal 1, OT) goal revised this date  -AR     Row Name 08/15/22 0957          Grooming Goal  1 (OT)    Activity/Device (Grooming Goal 1, OT) oral care  -AR     [place] (Grooming Goal 1, OT) verbal cues required;minimum assist (75% or more patient effort)  -AR     Time Frame (Grooming Goal 1, OT) long term goal (LTG);by discharge  -AR     Progress/Outcome (Grooming Goal 1, OT) goal ongoing  -AR     Row Name 08/15/22 0957          ROM Goal 1 (OT)    ROM Goal 1 (OT) Pt demonstrates independence with LUE ROM HEP avoiding ABDuction and ER per MD parameters  -AR     Row Name 08/15/22 0957          Strength Goal 1 (OT)    Progress/Outcome (Strength Goal 1, OT) goal ongoing;continuing progress toward goal  -AR           User Key  (r) = Recorded By, (t) = Taken By, (c) = Cosigned By    Initials Name Provider Type    Nathalia Nava, OT Occupational Therapist               Clinical Impression     Row Name 08/15/22 0952          Pain Assessment    Pain Intervention(s) Repositioned  -AR     Row Name 08/15/22 0952          Pain Scale: FACES Pre/Post-Treatment    Pain: FACES Scale, Pretreatment 2-->hurts little bit  -AR     Posttreatment Pain Rating 2-->hurts little bit  -AR     Pain Location - Side/Orientation Right  -AR     Pain Location - flank  -AR     Row Name 08/15/22 0952          Plan of Care Review    Plan of Care Reviewed With patient;spouse  -AR     Progress no change  -AR     Outcome Evaluation Pt continues to declined OOB activity including up to chair via lift, due to c/o fatigue and dyspnea. Pt on 4.5L NC during session and dyspnea noted with conversation. He declined simple grooming tasks supine level. He participated in BUE AA/AROM HEP. Will decrease frequency of 3x/wk if participation remains limited. Recommend SNF.  -AR     Row Name 08/15/22 0952          Therapy Assessment/Plan (OT)    Rehab Potential (OT) fair, will monitor progress closely  -AR     Row Name 08/15/22 0952          Therapy Plan Review/Discharge Plan (OT)    Anticipated Discharge Disposition (OT) skilled nursing facility   -AR     Row Name 08/15/22 0952          Vital Signs    Pre SpO2 (%) 93  -AR     O2 Delivery Pre Treatment supplemental O2  -AR     Intra SpO2 (%) 92  -AR     O2 Delivery Intra Treatment supplemental O2  -AR     Post SpO2 (%) 94  -AR     O2 Delivery Post Treatment supplemental O2  -AR     Pre Patient Position Supine  -AR     Intra Patient Position Supine  -AR     Post Patient Position Supine  -AR     Row Name 08/15/22 0952          Positioning and Restraints    Pre-Treatment Position in bed  -AR     Post Treatment Position bed  -AR     In Bed supine;call light within reach;encouraged to call for assist;with family/caregiver;exit alarm on  -AR           User Key  (r) = Recorded By, (t) = Taken By, (c) = Cosigned By    Initials Name Provider Type    Nathalia Nava OT Occupational Therapist               Outcome Measures     Row Name 08/15/22 0959          How much help from another is currently needed...    Putting on and taking off regular lower body clothing? 1  -AR     Bathing (including washing, rinsing, and drying) 2  -AR     Toileting (which includes using toilet bed pan or urinal) 1  -AR     Putting on and taking off regular upper body clothing 2  -AR     Taking care of personal grooming (such as brushing teeth) 2  -AR     Eating meals 3  -AR     AM-PAC 6 Clicks Score (OT) 11  -AR     Row Name 08/15/22 0959          Functional Assessment    Outcome Measure Options AM-PAC 6 Clicks Daily Activity (OT)  -AR           User Key  (r) = Recorded By, (t) = Taken By, (c) = Cosigned By    Initials Name Provider Type    Nathalia Nava OT Occupational Therapist                Occupational Therapy Education                 Title: PT OT SLP Therapies (Done)     Topic: Occupational Therapy (Done)     Point: ADL training (Done)     Description:   Instruct learner(s) on proper safety adaptation and remediation techniques during self care or transfers.   Instruct in proper use of assistive devices.               Learning Progress Summary           Patient Acceptance, E, VU,NR by AR at 8/15/2022 0959   Family Acceptance, E, VU,NR by AR at 8/15/2022 0959      Show all documentation for this point (10)                 Point: Home exercise program (Done)     Description:   Instruct learner(s) on appropriate technique for monitoring, assisting and/or progressing therapeutic exercises/activities.              Learning Progress Summary           Patient Acceptance, E, VU,NR by AR at 8/15/2022 0959   Family Acceptance, E, VU,NR by AR at 8/15/2022 0959      Show all documentation for this point (10)                 Point: Precautions (Done)     Description:   Instruct learner(s) on prescribed precautions during self-care and functional transfers.              Learning Progress Summary           Patient Acceptance, E, VU,NR by AR at 8/15/2022 0959   Family Acceptance, E, VU,NR by AR at 8/15/2022 0959      Show all documentation for this point (10)                 Point: Body mechanics (Done)     Description:   Instruct learner(s) on proper positioning and spine alignment during self-care, functional mobility activities and/or exercises.              Learning Progress Summary           Patient Acceptance, E, VU,NR by AR at 8/15/2022 0959   Family Acceptance, E, VU,NR by AR at 8/15/2022 0959      Show all documentation for this point (6)                             User Key     Initials Effective Dates Name Provider Type Discipline    AR 06/16/21 -  Nathalia Osborne OT Occupational Therapist OT              OT Recommendation and Plan     Plan of Care Review  Plan of Care Reviewed With: patient, spouse  Progress: no change  Outcome Evaluation: Pt continues to declined OOB activity including up to chair via lift, due to c/o fatigue and dyspnea. Pt on 4.5L NC during session and dyspnea noted with conversation. He declined simple grooming tasks supine level. He participated in BUE AA/AROM HEP. Will decrease frequency of 3x/wk if  participation remains limited. Recommend SNF.     Time Calculation:    Time Calculation- OT     Row Name 08/15/22 1000             Time Calculation- OT    OT Start Time 0927  -AR      OT Received On 08/15/22  -AR      OT Goal Re-Cert Due Date 08/25/22  -AR              Timed Charges    17795 - OT Therapeutic Exercise Minutes 16  -AR              Total Minutes    Timed Charges Total Minutes 16  -AR       Total Minutes 16  -AR            User Key  (r) = Recorded By, (t) = Taken By, (c) = Cosigned By    Initials Name Provider Type    Nathalia Nava OT Occupational Therapist              Therapy Charges for Today     Code Description Service Date Service Provider Modifiers Qty    [id number] HC OT THER PROC EA 15 MIN 8/15/2022 Nathalia Osborne OT GO 1               Nathalia Osborne OT  8/15/2022

## 2022-08-15 NOTE — PROGRESS NOTES
Cardiothoracic Surgery Progress Note      POD #: 13-left great toe transmetatarsal amputation primary closure     LOS: 19 days      Subjective: Awake and alert    Objective:  Vital Signs vital signs below noted T-max past 2490.2 °F  Temp:  [97.8 °F (36.6 °C)-98.2 °F (36.8 °C)] 97.8 °F (36.6 °C)  Heart Rate:  [69-84] 76  Resp:  [16-22] 18  BP: ()/(59-82) 169/75    Physical Exam:   General Appearance: Oriented   Lungs:   Heart:   Skin: Ulceration of the left second toe of the middle phalangeal joint space appears to have penetration down to the joint space itself with exposed phalangeal bone   Incision: Amputation site dressing change.  Suture intact skin margin viable skin flaps are viable     Results:  Results from last 7 days   Lab Units 08/13/22  2310   WBC 10*3/mm3 14.07*   HEMOGLOBIN g/dL 10.2*   HEMATOCRIT % 31.2*   PLATELETS 10*3/mm3 226     Results from last 7 days   Lab Units 08/13/22  2310   SODIUM mmol/L 139   POTASSIUM mmol/L 4.0   CHLORIDE mmol/L 99   CO2 mmol/L 27.0   BUN mg/dL 51*   CREATININE mg/dL 2.07*   GLUCOSE mg/dL 151*   CALCIUM mg/dL 8.5*         Assessment: #1.  Postop day 13 left great toe transmetatarsal amputation primary closure healing well  2 status post remote coronary bypass grafting  3 status post remote pacemaker placement sick sinus syndrome  4.  Recent fall with shoulder dislocation left shoulder reduced in emergency department  5.  Endovascular angioplasty stenting right lower extremity per Dr. Hernandez 2019  Fixed pulmonary flash edema cardiac ischemic related  7.  Exposed middle phalangeal joint space of the left second toe from chronic diabetic/ischemic ulceration      Plan: Medical manage per hospitalist/cardiologist.  Antibiotics per infectious disease.      Gopal Márquez MD - 08/15/22 - 05:53 EDT

## 2022-08-15 NOTE — PROGRESS NOTES
Jermaine JIMENES Francisco  1945  [id number]    Date of Consult: 7/28/22    Admission Date: 7/27/2022      Requesting Provider: No ref. provider found  Evaluating Physician: Sage Braga MD    Reason for Consultation: Evaluation of toe wound    History of present illness:    Patient is a 77 y.o. male with coronary disease history of CABG diabetes mellitus type 2 hypothyroidism hypertension hyperlipidemia prevascular disease presents the emergency room after having a fall and injuring left shoulder patient tripped while walking his dog.  Coincidentally patient wears an orthotic shoe as recommended by a podiatrist has been on oral doxycycline for a left toe wound we are asked to evaluate this patient to start on broad-spectrum antibiotics occluding vancomycin and Zosyn.  X-ray of left foot showed soft tissue swelling in the forefoot without obvious fracture or osteomyelitis.    Has history of pacemaker    History of vascular stent placed in right leg.    7/29/2022; is having bone scan today denies fevers rash sore throat or diarrhea    7/30/22:  Bone scan compatible with osteomyelitis distal phalanx of first digit, acute fracture 2nd digit.  Afebrile. Blood cultures no growth to date.     7/31/22:  Afebrile.   Dr. Márquez consulting Dr. Apodaca for vascular studies left lower extremity.   No n/v/d.  Complains of shoulder pain.     8/2/22; doing well; had surgery today; toe amputation, no fever, rash, sore throat    8/3/22; no events overnight; resting quietly no events overnight    8/4/22; doing well; no events overnight; no fever, rash, sore throat;   8/5/22; no events overnight; no fever, rash, sore throat, no diarrhea    8/8/22; no events overnight; no fever, rash, sore throat    8/9/22; no events overnight; no fever, rash, sore throat no diarrhea  8/11/22; patient on hfnc, followed by cardiology being worked up for elevated troponin, pobnp.  Resting quietly    8/12/22; on hfnc, getting diuresed, no fever, rahs, sore  throat has nausea, reports chest pain    8/14/22: on 4L O2.  Denies f/c, sore throat, n/v/d, rashes.   8/15/22: on 4.5 L O2.  Still with right upper chest wall pain.  Denies f/c, sob, n/v/d, rashes. Just back from [facility].      Past Medical History:   Diagnosis Date    Asthma     Coronary artery disease     Diabetes mellitus (HCC) 2000    started on inuslin 12/2018; started on po meds in 2000; checking blood sugars daily     Disease of thyroid gland     po meds daily for hypothyroidism     History of fracture as a child     rt leg- severe     Hyperlipidemia     Hypertension     Hypothyroidism     Peripheral neuropathy     Peripheral vascular disease (HCC)     s/p angiogram 2/19-needs stent in left leg     RBBB     Right knee pain     Vitamin D deficiency        Past Surgical History:   Procedure Laterality Date    APPENDECTOMY      CARDIAC CATHETERIZATION N/A 2/14/2019    Procedure: Left Heart Cath;  Surgeon: Cooper Apodaca MD;  Location:  [facility] CATH INVASIVE LOCATION;  Service: Cardiology    CARDIAC ELECTROPHYSIOLOGY PROCEDURE N/A 5/18/2022    Procedure: DEVICE IMPLANT;  Surgeon: Cooper Apodaca MD;  Location:  [facility] CATH INVASIVE LOCATION;  Service: Cardiology;  Laterality: N/A;    COLONOSCOPY      CORONARY ARTERY BYPASS GRAFT N/A 3/22/2019    Procedure: MEDIAN STERNOTOMY, CORONARY ARTERY BYPASS GRAFT X3, UTILIZING THE LEFT INTERNAL MAMMARY ARTERY, EVH AND OPEN HARVEST OF THE RIGHT GREATER SAPHENOUS VEIN, EXPLORATION OF THE LEFT LEG;  Surgeon: Ap Au MD;  Location:  [facility];  Service: Cardiothoracic    EYE SURGERY Bilateral     cataracts     INTERVENTIONAL RADIOLOGY PROCEDURE N/A 7/29/2021    Procedure: Abdominal Aortagram with Runoff;  Surgeon: Jermaine Hernandez MD;  Location:  [facility] CATH INVASIVE LOCATION;  Service: Cardiovascular;  Laterality: N/A;    KNEE ARTHROSCOPY      right x 2, left x 1    LACERATION REPAIR      right leg    LEG SURGERY      2 for fracture of rt leg      TONSILLECTOMY      Adnoidectomy    TRANS METATARSAL AMPUTATION Left 8/2/2022    Procedure: GREAT TOE AMPUTATION LEFT;  Surgeon: Gopal Márquez MD;  Location: [facility];  Service: Vascular;  Laterality: Left;       Family History   Problem Relation Age of Onset    Coronary artery disease Mother     Diabetes Mother     Cancer Father        Social History     Socioeconomic History    Marital status:     Number of children: 2   Tobacco Use    Smoking status: Never Smoker    Smokeless tobacco: Never Used   Substance and Sexual Activity    Alcohol use: No    Drug use: No    Sexual activity: Defer       No Known Allergies      Medication:    Current Facility-Administered Medications:     acetaminophen (TYLENOL) tablet 650 mg, 650 mg, Oral, Q4H PRN, 650 mg at 08/06/22 2110 **OR** acetaminophen (TYLENOL) 160 MG/5ML solution 650 mg, 650 mg, Oral, Q4H PRN **OR** acetaminophen (TYLENOL) suppository 650 mg, 650 mg, Rectal, Q4H PRN, Nishant Allan PA    amLODIPine (NORVASC) tablet 10 mg, 10 mg, Oral, Daily, Nishant Allan PA, 10 mg at 08/15/22 0849    ampicillin-sulbactam 3 g/100 mL 0.9% NS IVPB, 3 g, Intravenous, Q8H, Az Lagos PA, Last Rate: 0 mL/hr at 08/10/22 0133, 3 g at 08/15/22 0844    apixaban (ELIQUIS) tablet 10 mg, 10 mg, Oral, Q12H, 10 mg at 08/15/22 0848 **FOLLOWED BY** [START ON 8/21/2022] apixaban (ELIQUIS) tablet 5 mg, 5 mg, Oral, Q12H, Reilly Thomas MD    aspirin chewable tablet 81 mg, 81 mg, Oral, Daily, Nishant Allan PA, 81 mg at 08/15/22 0848    bisacodyl (DULCOLAX) suppository 10 mg, 10 mg, Rectal, Daily PRN, Nishant Allan PA, 10 mg at 08/12/22 2041    cetirizine (zyrTEC) tablet 5 mg, 5 mg, Oral, Daily, Nishant Allan PA, 5 mg at 08/15/22 0849    dextrose (D50W) (25 g/50 mL) IV injection 25 g, 25 g, Intravenous, Q15 Min PRN, Nishant Allan, PA    dextrose (GLUTOSE) oral gel 15 g, 15 g, Oral, Q15 Min PRN, Nishant Allan, PA    glucagon (human recombinant) (GLUCAGEN DIAGNOSTIC)  injection 1 mg, 1 mg, Intramuscular, Q15 Min PRN, Nishant Allan PA    HYDROcodone-acetaminophen (NORCO) 5-325 MG per tablet 1 tablet, 1 tablet, Oral, Q4H PRN, Peter Braga DO, 1 tablet at 08/15/22 0733    insulin detemir (LEVEMIR) injection 30 Units, 30 Units, Subcutaneous, Nightly, Hayder Vogel MD    Insulin Lispro (humaLOG) injection 0-14 Units, 0-14 Units, Subcutaneous, TID AC, Hayder Vogel MD, 5 Units at 08/15/22 1305    ipratropium-albuterol (DUO-NEB) nebulizer solution 3 mL, 3 mL, Nebulization, Q4H PRN, Eliana Hightower APRN, 3 mL at 08/15/22 0341    levothyroxine (SYNTHROID, LEVOTHROID) tablet 50 mcg, 50 mcg, Oral, Q AM, Nishant Allan PA, 50 mcg at 08/15/22 0530    metoprolol succinate XL (TOPROL-XL) 24 hr tablet 50 mg, 50 mg, Oral, Q24H, Nishant Allan PA, 50 mg at 08/15/22 0849    nitroglycerin (NITROSTAT) SL tablet 0.4 mg, 0.4 mg, Sublingual, Q5 Min PRN, Reilly Thomas MD, 0.4 mg at 08/13/22 2206    ondansetron (ZOFRAN) tablet 4 mg, 4 mg, Oral, Q6H PRN **OR** ondansetron (ZOFRAN) injection 4 mg, 4 mg, Intravenous, Q6H PRN, Nishant Allan PA, 4 mg at 08/10/22 1607    phenol (CHLORASEPTIC) 1.4 % liquid 1 spray, 1 spray, Mouth/Throat, Q2H PRN, Megan Shukla PA, 1 spray at 08/04/22 1610    polyethylene glycol (MIRALAX) packet 17 g, 17 g, Oral, Daily, Rajani Farr DO, 17 g at 08/15/22 0849    prochlorperazine (COMPAZINE) injection 5 mg, 5 mg, Intravenous, Q6H PRN, Rajani Farr DO, 5 mg at 08/13/22 1955    sennosides-docusate (PERICOLACE) 8.6-50 MG per tablet 2 tablet, 2 tablet, Oral, BID PRN, Nishant Allan PA, 2 tablet at 08/09/22 1402    sennosides-docusate (PERICOLACE) 8.6-50 MG per tablet 2 tablet, 2 tablet, Oral, BID, Reilly Thomas MD, 2 tablet at 08/15/22 0848    [START ON 8/16/2022] simethicone (MYLICON) chewable tablet 80 mg, 80 mg, Oral, Once, Daphne Lal MD    simethicone (MYLICON) chewable tablet 80 mg, 80 mg, Oral, 4x Daily PRN, Daphne Lal,  MD    [COMPLETED] Insert peripheral IV, , , Once **AND** sodium chloride 0.9 % flush 10 mL, 10 mL, Intravenous, PRN, Nishant Allan PA    sodium chloride 0.9 % flush 10 mL, 10 mL, Intravenous, Q12H, Nishant Allan PA, 10 mL at 08/14/22 2117    sodium chloride 0.9 % flush 10 mL, 10 mL, Intravenous, PRN, Nishant Allan PA    sodium chloride 0.9 % flush 10 mL, 10 mL, Intravenous, Q12H, Rajani Farr R, DO, 10 mL at 08/15/22 1000    sodium chloride 0.9 % flush 10 mL, 10 mL, Intravenous, PRN, Rajani Farr R, DO, 10 mL at 08/10/22 1416    vitamin D3 capsule 5,000 Units, 5,000 Units, Oral, Daily, Nishant Allan PA, 5,000 Units at 08/15/22 0848    Facility-Administered Medications Ordered in Other Encounters:     Chlorhexidine Gluconate Cloth 2 % pads 1 application, 1 application, Topical, Q12H PRN, Mohan rAauz PA    Antibiotics:  Anti-Infectives (From admission, onward)      Ordered     Dose/Rate Route Frequency Start Stop    08/08/22 1445  ampicillin-sulbactam 3 g/100 mL 0.9% NS IVPB        Ordering Provider: Az Lagos PA    3 g Intravenous Every 8 Hours 08/08/22 1600 08/21/22 1207    08/04/22 1550  cefepime (MAXIPIME) 2 g/100 mL 0.9% NS (mbp)        Ordering Provider: Sage Braga MD    2 g  200 mL/hr over 30 Minutes Intravenous Once 08/04/22 1645 08/04/22 1640    08/02/22 1111  vancomycin 1500 mg/500 mL 0.9% NS IVPB ([facility])        Ordering Provider: Nishant Allan PA    15 mg/kg × 98 kg Intravenous Once 08/02/22 2200 08/02/22 2130    08/01/22 1209  vancomycin in dextrose 5% 150 mL (VANCOCIN) IVPB 750 mg        Ordering Provider: Miguel Bray [facility]    750 mg Intravenous Every 12 Hours 08/01/22 2200 08/02/22 0934    08/01/22 1209  vancomycin in dextrose 5% 150 mL (VANCOCIN) IVPB 750 mg        Ordering Provider: Miguel Bray RPH    750 mg  over 60 Minutes Intravenous Every 12 Hours 08/01/22 1300 08/01/22 1422    07/28/22 0014  piperacillin-tazobactam (ZOSYN) 3.375 g in  iso-osmotic dextrose 50 ml (premix)        Ordering Provider: Eliana Hightower APRN    3.375 g  over 4 Hours Intravenous Every 8 Hours 22 0800 22 0303    22  piperacillin-tazobactam (ZOSYN) 3.375 g in iso-osmotic dextrose 50 ml (premix)        Ordering Provider: Donny Hightower APRN    3.375 g  over 30 Minutes Intravenous Once 22 2210 22 0320    22 220  vancomycin 2000 mg/500 mL 0.9% NS IVPB ([facility])        Ordering Provider: Donny Hightower APRN    20 mg/kg × 98 kg Intravenous Once 22 0040              Review of Systems:  See HPI      Physical Exam:   Vital Signs  Temp (24hrs), Av.8 °F (36.6 °C), Min:97.6 °F (36.4 °C), Max:98 °F (36.7 °C)    Temp  Min: 97.6 °F (36.4 °C)  Max: 98 °F (36.7 °C)  BP  Min: 126/77  Max: 169/75  Pulse  Min: 69  Max: 84  Resp  Min: 16  Max: 20  SpO2  Min: 6 %  Max: 97 %    GENERAL: resting quietly eating a popsicle.  in no acute distress.   HEENT: Normocephalic, atraumatic.  PERRL. EOMI. No conjunctival injection. No icterus.   HEART: RRR; No murmur,  LUNGS: Clear to auscultation bilaterally   ABDOMEN: Soft, nontender,   :  Without Fonseca catheter.  MSK: Left foot wrapped  SKIN: Warm and dry without cutaneous eruptions on Inspection/palpation.        Laboratory Data    Results from last 7 days   Lab Units 08/15/22  0615 22  2310 22  1224   WBC 10*3/mm3 9.63 14.07* 14.42*   HEMOGLOBIN g/dL 10.6* 10.2* 10.5*   HEMATOCRIT % 32.6* 31.2* 31.4*   PLATELETS 10*3/mm3 199 226 209     Results from last 7 days   Lab Units 08/15/22  0615   SODIUM mmol/L 138   POTASSIUM mmol/L 4.9   CHLORIDE mmol/L 97*   CO2 mmol/L 27.0   BUN mg/dL 64*   CREATININE mg/dL 2.16*   GLUCOSE mg/dL 225*   CALCIUM mg/dL 8.8     Results from last 7 days   Lab Units 22  2310   ALK PHOS U/L 60   BILIRUBIN mg/dL 0.3   ALT (SGPT) U/L 13   AST (SGOT) U/L 12             Results from last 7 days   Lab Units 22  0636   LACTATE mmol/L 1.2     Results  from last 7 days   Lab Units 08/15/22  0615   CK TOTAL U/L 171         Estimated Creatinine Clearance: 39.7 mL/min (A) (by C-G formula based on SCr of 2.16 mg/dL (H)).      Microbiology:  Microbiology Results (last 10 days)       Procedure Component Value - Date/Time    Eosinophil Smear - Urine, Urine, Clean Catch [[id number]]  (Normal) Collected: 08/14/22 1818    Lab Status: Final result Specimen: Urine, Clean Catch Updated: 08/14/22 2101     Eosinophil Smear 0 % EOS/100 Cells     Narrative:      No eosinophil seen                  Radiology:  Imaging Results (Last 72 Hours)       Procedure Component Value Units Date/Time    US Renal Bilateral [[id number]] Resulted: 08/15/22 1212     Updated: 08/15/22 1220    XR Chest 1 View [[id number]] Collected: 08/14/22 0140     Updated: 08/14/22 0145    Narrative:      Single portable chest radiograph    INDICATION:  Shortness of breath    COMPARISON:  Chest radiograph 8/12/2022    FINDINGS:    Right-sided PICC line terminates in the cavoatrial junction. Cardiac pacemaker.    Pulmonary vascular congestion. Increased patchy bibasilar opacities. Pleural effusion, or pneumothorax.    The cardiomediastinal silhouette is mildly enlarged as before. Mediastinal clips. Cardiac pacemaker.    No acute focal bony abnormalities are seen. Median sternotomy.      Impression:        Increased patchy bibasilar opacities    Cardiomegaly and pulmonary vascular congestion.    Electronically signed by:  Melissa Flores M.D.    8/13/2022 11:44 PM Mountain Time    NM Lung Scan Perfusion Particulate [[id number]] Collected: 08/12/22 1724     Updated: 08/12/22 2253    Narrative:      DATE OF EXAM: 8/12/2022 3:16 PM     PROCEDURE: NM LUNG SCAN PERFUSION PARTICULATE-     INDICATIONS: acute respiratory failure, r/o PE; S43.005A-Unspecified  dislocation of left shoulder joint, initial encounter;  L03.116-Cellulitis of left lower limb; Z78.9-Other specified health  status     COMPARISON: Portable chest  radiograph of same day     TECHNIQUE: 5.5 mCi of technetium 99m labeled MAA was administered  intravenously for the perfusion portion of the pulmonary exam.  Scintigraphic images of the lungs were obtained in multiple projections  to assess perfusion.     FINDINGS:   The perfusion pattern appears heterogeneous, which would likely be  explained by the patient's pulmonary interstitial edema. No well-defined  large or small segmental or subsegmental defects are identified. Study  is considered low probability for pulmonary embolus.        Impression:      Heterogeneous perfusion scan, which likely reflects the patient's  pulmonary edema seen on concurrent chest radiograph. Study is considered  low probability for pulmonary embolic disease.     This report was finalized on 8/12/2022 10:50 PM by Dr. Leodan Nelson MD.       CT Chest Without Contrast Diagnostic [[id number]] Collected: 08/12/22 1803     Updated: 08/12/22 1809    Narrative:      DATE OF EXAM: 8/12/2022 3:25 PM     PROCEDURE: CT CHEST WO CONTRAST DIAGNOSTIC-     INDICATIONS: respiratory failure, high oxygen requirement;  S43.005A-Unspecified dislocation of left shoulder joint, initial  encounter; L03.116-Cellulitis of left lower limb; Z78.9-Other specified  health status     COMPARISON: No comparisons available.     TECHNIQUE: Routine transaxial slices were obtained through the chest  without the administration of intravenous contrast. Reconstructed  coronal and sagittal images were also obtained. Automated exposure  control and iterative construction methods were used.     The radiation dose reduction device was turned on for each scan per the  ALARA (As Low as Reasonably Achievable) protocol.     FINDINGS:  There is no pathologic axillary adenopathy or other worrisome body wall  soft tissue finding in the chest. No acute findings are present in the  partially characterized upper abdomen. There are small bilateral pleural  effusions. There is no pericardial  effusion. There is no distinct  pathologic mediastinal adenopathy. Mildly atherosclerotic, nonaneurysmal  thoracic aorta. Evaluation of the osseous structures demonstrates no  evidence of acute fracture or or aggressive osseous lesion, with  multilevel thoracic spondylosis change present. Evaluation of the lung  fields demonstrates interlobular septal thickening, with some  intervening groundglass opacity, suggesting prominent component of  pulmonary edema. There is some involved fairly symmetric consolidation  present in the lower lobes, without additional specific evidence of  superimposed pneumonia. Nonspecific grouped pulmonary nodules are  present anteriorly at the right lung base, with a discrete nodule  present measuring 10 mm.        Impression:      Appearance most consistent with pulmonary edema, including  small-to-moderate bilateral pleural effusions. Nonspecific pulmonary  nodules are present anteriorly near the right lung base, measuring up to  10 mm. Consider nonemergent follow-up CT chest without acute process has  resolved.     This report was finalized on 8/12/2022 6:05 PM by Jermaine Wang.             Estimated Creatinine Clearance: 39.7 mL/min (A) (by C-G formula based on SCr of 2.16 mg/dL (H)).      Impression:   Leukocytosis with neutrophilia, improved.   Lactic acidosis  Left great toe wound- osteomyelitis by bone scan- amputation recommended by Dr. Márquez   Status post fall  Diabetes mellitus type 2  Left shoulder dislocation  Probable peripheral vascular disease  Acute renal failure, ongoing.   Elevated proBNP/difficulty diuresis given renal failure.  Right upper chest wall pain, ongoing.     PLAN/RECOMMENDATIONS:     cont unasyn 3 g iv q8h  Defer CHF management to cardiology     Cont iv abx x 6 weeks form 8/2/22 (9/13/22)  Patient has improved    picc placed 8/10  Anticipate placement at LTAC    Sage Braga MD saw and examined patient, verified hx and PE reviewed labs and micro data,  and formulated dx, plan for treatment and all medical decision making.      Az Lagos PA-C for Sage Braga MD    D/w family    I have seen and examined patient and agree with above  RANULFO Carlin  8/15/2022  13:41 EDT

## 2022-08-15 NOTE — PROGRESS NOTES
"   LOS: 19 days    Patient Care Team:  Farooq Painter MD as PCP - General (Family Medicine)  Jermaine Hernandez MD as Consulting Physician (Cardiology)    Chief Complaint:   77-year-old male was admitted with fall and left shoulder pain, also had left foot to infection has been treated with antibiotic.  Initial creatinine has been within acceptable range, patient received IV vancomycin on starting 7/27/2022, was continued until 8/4/2022 at that time the creatinine started to go up to 2.02 patient's antibiotic was changed, taken off the vancomycin at that time creatinine started to get improved 1.8 slowly over the last 3 days creatinine has been slowly creeping up.  Patient has a past medical history of CAD, CABG, hyperlipidemia, diabetes type 2, hypertension, hypothyroidism, peripheral vascular disease, sick sinus syndrome with pacemaker placement.  Further work-up of left foot showed osteomyelitis and was started on antibiotic as noted above.  He had denied any epistaxis, paresis, hematemesis, gross hematuria, kidney stone, nonsteroidal use, diarrhea, constipation, recent rash, no family history of kidney disease.  Today's labs show a creatinine 2.07 BUN of 51, albumin 2.9, white count 14.2, platelets 209 normal.  Renal been consulted for further work-up for [place].  Patient is on ampicillin IV, and IV Solu-Medrol 60 mg.  Subjective     Interval History:   Renal function about the same.  Review of Systems:   Complain generalized weakness all other systems negative.    Objective     Vital Sign Min/Max for last 24 hours  Temp  Min: 97.6 °F (36.4 °C)  Max: 97.9 °F (36.6 °C)   BP  Min: 132/61  Max: 169/75   Pulse  Min: 70  Max: 84   Resp  Min: 16  Max: 20   SpO2  Min: 90 %  Max: 97 %   Flow (L/min)  Min: 4  Max: 4.5   No data recorded     Flowsheet Rows    Flowsheet Row First Filed Value   Admission Height 190.5 cm (75\") Documented at 07/27/2022 1848   Admission Weight 98 kg (216 lb) Documented at 07/27/2022 1848    "       I/O this shift:  In: -   Out: 175 [Urine:175]  I/O last 3 completed shifts:  In: 1366.3 [P.O.:640; I.V.:326.3; IV Piggyback:400]  Out: 725 [Urine:725]    Physical Exam:  General Appearance: Alert, oriented, no obvious distress.  Eyes: PER, EOMI.  Neck: Supple no JVD.  Lungs: Clear auscultation, no rales rhonchi's, equal chest movement, nonlabored.  Heart: No gallop, murmur, rub, RRR.  Abdomen: Soft, nontender, positive bowel sounds, no organomegaly.  Extremities: No edema, no cyanosis.  Left toe in bandage  Neuro: No focal deficit, moving all extremities, alert oriented X 3   no suprapubic fullness or tenderness  WBC WBC   Date Value Ref Range Status   08/15/2022 9.63 3.40 - 10.80 10*3/mm3 Final   08/13/2022 14.07 (H) 3.40 - 10.80 10*3/mm3 Final   08/13/2022 14.42 (H) 3.40 - 10.80 10*3/mm3 Final      HGB Hemoglobin   Date Value Ref Range Status   08/15/2022 10.6 (L) 13.0 - 17.7 g/dL Final   08/13/2022 10.2 (L) 13.0 - 17.7 g/dL Final   08/13/2022 10.5 (L) 13.0 - 17.7 g/dL Final      HCT Hematocrit   Date Value Ref Range Status   08/15/2022 32.6 (L) 37.5 - 51.0 % Final   08/13/2022 31.2 (L) 37.5 - 51.0 % Final   08/13/2022 31.4 (L) 37.5 - 51.0 % Final      Platlets No results found for: LABPLAT   MCV MCV   Date Value Ref Range Status   08/15/2022 90.1 79.0 - 97.0 fL Final   08/13/2022 89.4 79.0 - 97.0 fL Final   08/13/2022 88.2 79.0 - 97.0 fL Final          Sodium Sodium   Date Value Ref Range Status   08/15/2022 138 136 - 145 mmol/L Final   08/13/2022 139 136 - 145 mmol/L Final   08/13/2022 143 136 - 145 mmol/L Final      Potassium Potassium   Date Value Ref Range Status   08/15/2022 4.9 3.5 - 5.2 mmol/L Final     Comment:     Slight hemolysis detected by analyzer. Results may be affected.   08/13/2022 4.0 3.5 - 5.2 mmol/L Final   08/13/2022 3.8 3.5 - 5.2 mmol/L Final      Chloride Chloride   Date Value Ref Range Status   08/15/2022 97 (L) 98 - 107 mmol/L Final   08/13/2022 99 98 - 107 mmol/L Final    08/13/2022 104 98 - 107 mmol/L Final      CO2 CO2   Date Value Ref Range Status   08/15/2022 27.0 22.0 - 29.0 mmol/L Final   08/13/2022 27.0 22.0 - 29.0 mmol/L Final   08/13/2022 28.0 22.0 - 29.0 mmol/L Final      BUN BUN   Date Value Ref Range Status   08/15/2022 64 (H) 8 - 23 mg/dL Final   08/13/2022 51 (H) 8 - 23 mg/dL Final   08/13/2022 51 (H) 8 - 23 mg/dL Final      Creatinine Creatinine   Date Value Ref Range Status   08/15/2022 2.16 (H) 0.76 - 1.27 mg/dL Final   08/13/2022 2.07 (H) 0.76 - 1.27 mg/dL Final   08/13/2022 1.94 (H) 0.76 - 1.27 mg/dL Final      Calcium Calcium   Date Value Ref Range Status   08/15/2022 8.8 8.6 - 10.5 mg/dL Final   08/13/2022 8.5 (L) 8.6 - 10.5 mg/dL Final   08/13/2022 8.4 (L) 8.6 - 10.5 mg/dL Final      PO4 No results found for: CAPO4   Albumin Albumin   Date Value Ref Range Status   08/15/2022 3.60 3.50 - 5.20 g/dL Final   08/13/2022 2.90 (L) 3.50 - 5.20 g/dL Final      Magnesium Magnesium   Date Value Ref Range Status   08/13/2022 2.7 (H) 1.6 - 2.4 mg/dL Final      Uric Acid No results found for: URICACID        Results Review:     I reviewed the patient's new clinical results.    amLODIPine, 10 mg, Oral, Daily  ampicillin-sulbactam, 3 g, Intravenous, Q8H  apixaban, 10 mg, Oral, Q12H   Followed by  [START ON 8/21/2022] apixaban, 5 mg, Oral, Q12H  aspirin, 81 mg, Oral, Daily  cetirizine, 5 mg, Oral, Daily  insulin detemir, 30 Units, Subcutaneous, Nightly  insulin lispro, 0-14 Units, Subcutaneous, TID AC  levothyroxine, 50 mcg, Oral, Q AM  metoprolol succinate XL, 50 mg, Oral, Q24H  polyethylene glycol, 17 g, Oral, Daily  senna-docusate sodium, 2 tablet, Oral, BID  [START ON 8/16/2022] simethicone, 80 mg, Oral, Once  sodium chloride, 10 mL, Intravenous, Q12H  sodium chloride, 10 mL, Intravenous, Q12H  vitamin D3, 5,000 Units, Oral, Daily           Medication Review: Reviewed    Assessment & Plan       Sepsis (HCC)    Type 2 diabetes mellitus (HCC)    Hypertension     Hyperlipidemia    Hypothyroidism (acquired)    S/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 Au    SSS (sick sinus syndrome) (HCC)    Dislocation of left shoulder joint, initial encounter    Cellulitis in diabetic foot (HCC)    Fall    Lactic acidosis    Proximal phalanx fracture of the second digit extending into the second metatarsal joint    Acute renal failure (ARF) (HCC)    Acute systolic heart failure (HCC)    DVT, bilateral lower limbs (HCC)    Acute respiratory failure with hypoxia ([facility])        1.  Acute kidney injury: Baseline creatinine 1.02-1.05.  Patient was admitted and was started on antibiotic for osteomyelitis.  He received vancomycin which was started on 7/27/2022 until 8/4/2022 due to acute rise in creatinine to 2.07.  Patient had some improvement renal function until recently creatinine started to go up again.  Renal been consulted at this time.  No UAs available.  Patient had acute drop in blood pressure other than that no other finding.  [place] could be related to antibiotic or postinfectious GN.  2.  Osteomyelitis left toe.  3.  Type 2 diabetes.  4.  History of hypertension  5.  History of hyperlipidemia.  6.  CAD s/p CABG 3/22/2019 by Dr. Au's.  7.  Sick sinus syndrome.  8.  Proteinuria: Protein creatinine ratio 0.38 g  Recommendations plan.  Nonoliguric at this time.  Monitor closely.  Pending ultrasound of the kidney for size of the cortex and to rule out any obstruction.  Pending renal artery duplex to rule out renal artery stenosis.  Urine lab includes urine sodium, urine protein, urine creatinine.  CK1 31, C3 complement 137, C4 complement 34, urine eos 0, urine creatinine 150, urine protein 57.3  Pending TRISH.  Avoid nephrotoxic medications.  Keep systolic blood pressure greater than 100.  Check volume status.  Check labs in the morning.  Daily evaluation for renal replacement therapy will be done.  Adjust medication for the new GFR.  Case discussed with the medical staff taking care of the  patient.  High risk patient multiple medical problems.    Daphne Lal MD  08/15/22  15:53 EDT

## 2022-08-15 NOTE — PROGRESS NOTES
[facility] Medicine Services  PROGRESS NOTE    Patient Name: Jermaine Singh  : 1945  MRN: [id number]    Date of Admission: 2022  Primary Care Physician: Farooq Painter MD    Subjective   Subjective     CC:  F/u osteomyelitis    HPI:  Wife present.  Feels ago.  Continues to have some mild right sided breast pain, mostly after eating.  Remains on 4LNC.  Says breathing is ok.  + BM last night.  Some appetite this morning.    ROS:  Gen- No fevers, chills  CV- +  chest pain, palpitations  Resp- No cough, + dyspnea  GI- No N/V/D, abd pain    Objective   Objective     Vital Signs:   Temp:  [97.6 °F (36.4 °C)-98.2 °F (36.8 °C)] 97.6 °F (36.4 °C)  Heart Rate:  [69-84] 78  Resp:  [16-22] 18  BP: (126-169)/(59-82) 143/66  Flow (L/min):  [4-4.5] 4.5     Physical Exam:  Constitutional: mildly ill appearing, awake, alert  HENT: NCAT, mucous membranes moist  Respiratory: diminished bilaterally, respiratory effort normal on 4LNC  Cardiovascular: RRR, no murmurs, rubs, or gallops  Gastrointestinal: Positive bowel sounds, soft, non-tender, mild distention  Musculoskeletal: No bilateral ankle edema  Psychiatric: Appropriate affect, cooperative  Neurologic: Oriented x 3, no deficits  Skin: No rashes          Results Reviewed:  LAB RESULTS:      Lab 08/15/22  0615 22  1132 22  0116 22  2310 22  1818 22  1224 22  0941 22  0636 22  0023 08/10/22  1638 08/10/22  1638   WBC 9.63  --   --  14.07*  --  14.42* 13.76* 11.75* 15.41*  --  15.79*   HEMOGLOBIN 10.6*  --   --  10.2*  --  10.5* 11.2* 11.0* 11.8*  --  11.7*   HEMATOCRIT 32.6*  --   --  31.2*  --  31.4* 35.5* 33.7* 35.7*  --  36.2*   PLATELETS 199  --   --  226  --  209 236 250 252  --  309   NEUTROS ABS  --   --   --  11.61*  --  12.08*  --  9.88* 13.43*  --   --    IMMATURE GRANS (ABS)  --   --   --  0.06*  --  0.05  --  0.06* 0.07*  --   --    LYMPHS ABS  --   --   --  1.13  --  1.06  --  0.76  0.60*  --   --    MONOS ABS  --   --   --  1.17*  --  1.17*  --  1.01* 1.26*  --   --    EOS ABS  --   --   --  0.04  --  0.01  --  0.00 0.00  --   --    MCV 90.1  --   --  89.4  --  88.2 93.4 90.6 89.0  --  90.5   PROCALCITONIN  --   --   --   --   --   --   --   --   --   --  0.17   LACTATE  --   --   --   --   --   --   --  1.2  --   --   --    PROTIME  --   --   --   --   --  13.8  --   --  13.6  --   --    APTT  --   --   --   --   --  29.7*  --   --  33.1*  --   --    HEPARIN ANTI-XA  --  0.10* 0.49  --  0.37 0.10*  --  0.10* 0.10*   < >  --     < > = values in this interval not displayed.         Lab 08/15/22  0615 08/13/22  2310 08/13/22  0730 08/12/22  1018 08/12/22  0941 08/11/22  0636 08/10/22  1638   SODIUM 138 139 143  --  146* 145 145   POTASSIUM 4.9 4.0 3.8  --  4.2 4.1 4.5   CHLORIDE 97* 99 104  --  103 106 108*   CO2 27.0 27.0 28.0  --  26.0 26.0 22.0   ANION GAP 14.0 13.0 11.0  --  17.0* 13.0 15.0   BUN 64* 51* 51*  --  47* 46* 43*   CREATININE 2.16* 2.07* 1.94*  --  1.84* 1.80* 1.82*   EGFR 30.8* 32.4* 35.0*  --  37.3* 38.3* 37.8*   GLUCOSE 225* 151* 63*  --  206* 185* 236*   CALCIUM 8.8 8.5* 8.4*  --  8.8 8.9 8.7   IONIZED CALCIUM  --   --   --  1.22  --   --   --    MAGNESIUM  --  2.7*  --   --  2.2 2.3 2.5*   PHOSPHORUS 5.5*  --   --   --  4.4  --   --          Lab 08/15/22  0615 08/13/22  2310   TOTAL PROTEIN  --  6.1   ALBUMIN 3.60 2.90*   GLOBULIN  --  3.2   ALT (SGPT)  --  13   AST (SGOT)  --  12   BILIRUBIN  --  0.3   ALK PHOS  --  60         Lab 08/13/22  2310 08/13/22  1224 08/13/22  0730 08/12/22  1653 08/12/22  1025 08/11/22  0636 08/11/22  0023 08/10/22  2239 08/10/22  1638   PROBNP 31,919.0*  --   --   --   --  30,799.0*  --   --  21,299.0*   TROPONIN T 0.230* 0.229* 0.230* 0.206* 0.157* 0.146*  --    < > 0.108*   PROTIME  --  13.8  --   --   --   --  13.6  --   --    INR  --  1.07  --   --   --   --  1.05  --   --     < > = values in this interval not displayed.                 Lab  08/11/22  0041 08/10/22  1741 08/10/22  1633   PH, ARTERIAL 7.422 7.382 7.262*   PCO2, ARTERIAL 38.6 40.3 55.7*   PO2 ART 67.1* 106.0 50.7*   FIO2 32 60 80   HCO3 ART 25.2 23.9 25.1   BASE EXCESS ART 0.8 -1.1* -2.6*   CARBOXYHEMOGLOBIN 0.9 1.0 1.4     Brief Urine Lab Results  (Last result in the past 365 days)      Color   Clarity   Blood   Leuk Est   Nitrite   Protein   CREAT   Urine HCG        02/27/22 1321 Dark Yellow   Clear   Negative   Negative   Negative   2+                 Microbiology Results Abnormal     Procedure Component Value - Date/Time    Eosinophil Smear - Urine, Urine, Clean Catch [[id number]]  (Normal) Collected: 08/14/22 1818    Lab Status: Final result Specimen: Urine, Clean Catch Updated: 08/14/22 2101     Eosinophil Smear 0 % EOS/100 Cells     Narrative:      No eosinophil seen    Anaerobic Culture - Tissue, Toe, Left [[id number]] Collected: 08/02/22 1027    Lab Status: Final result Specimen: Tissue from Toe, Left Updated: 08/07/22 0858     Anaerobic Culture No anaerobes isolated at 5 days    Blood Culture - Blood, Hand, Left [[id number]]  (Normal) Collected: 07/27/22 2040    Lab Status: Final result Specimen: Blood from Hand, Left Updated: 08/01/22 2132     Blood Culture No growth at 5 days    Blood Culture - Blood, Hand, Right [[id number]]  (Normal) Collected: 07/27/22 2030    Lab Status: Final result Specimen: Blood from Hand, Right Updated: 08/01/22 2132     Blood Culture No growth at 5 days    COVID PRE-OP / PRE-PROCEDURE SCREENING ORDER (NO ISOLATION) - Swab, Nasopharynx [[id number]]  (Normal) Collected: 07/27/22 2246    Lab Status: Final result Specimen: Swab from Nasopharynx Updated: 07/27/22 2357    Narrative:      The following orders were created for panel order COVID PRE-OP / PRE-PROCEDURE SCREENING ORDER (NO ISOLATION) - Swab, Nasopharynx.  Procedure                               Abnormality         Status                     ---------                               -----------          ------                     COVID-19 and FLU A/B PCR...[[id number]]  Normal              Final result                 Please view results for these tests on the individual orders.    COVID-19 and FLU A/B PCR - Swab, Nasopharynx [[id number]]  (Normal) Collected: 07/27/22 2246    Lab Status: Final result Specimen: Swab from Nasopharynx Updated: 07/27/22 9911     COVID19 Not Detected     Influenza A PCR Not Detected     Influenza B PCR Not Detected    Narrative:      Fact sheet for providers: https://www.fda.gov/media/844687/download    Fact sheet for patients: https://www.fda.gov/media/939993/download    Test performed by PCR.          XR Chest 1 View    Result Date: 8/14/2022  Single portable chest radiograph INDICATION:  Shortness of breath COMPARISON:  Chest radiograph 8/12/2022 FINDINGS: Right-sided PICC line terminates in the cavoatrial junction. Cardiac pacemaker. Pulmonary vascular congestion. Increased patchy bibasilar opacities. Pleural effusion, or pneumothorax. The cardiomediastinal silhouette is mildly enlarged as before. Mediastinal clips. Cardiac pacemaker. No acute focal bony abnormalities are seen. Median sternotomy.     Impression: Increased patchy bibasilar opacities Cardiomegaly and pulmonary vascular congestion. Electronically signed by:  Melissa Flores M.D.  8/13/2022 11:44 PM Mountain Time      Results for orders placed during the hospital encounter of 07/27/22    Adult Transthoracic Echo Complete W/ Cont if Necessary Per Protocol    Interpretation Summary  · Left ventricular ejection fraction appears to be 46 - 50%. Left ventricular systolic function is low normal.  · Left ventricular septal hypokinesis  · No significant valvular heart disease      I have reviewed the medications:  Scheduled Meds:amLODIPine, 10 mg, Oral, Daily  ampicillin-sulbactam, 3 g, Intravenous, Q8H  apixaban, 10 mg, Oral, Q12H   Followed by  [START ON 8/21/2022] apixaban, 5 mg, Oral, Q12H  aspirin, 81 mg, Oral,  Daily  cetirizine, 5 mg, Oral, Daily  insulin detemir, 30 Units, Subcutaneous, Nightly  insulin lispro, 0-14 Units, Subcutaneous, TID AC  levothyroxine, 50 mcg, Oral, Q AM  metoprolol succinate XL, 50 mg, Oral, Q24H  polyethylene glycol, 17 g, Oral, Daily  senna-docusate sodium, 2 tablet, Oral, BID  sodium chloride, 10 mL, Intravenous, Q12H  sodium chloride, 10 mL, Intravenous, Q12H  vitamin D3, 5,000 Units, Oral, Daily      Continuous Infusions:   PRN Meds:.•  acetaminophen **OR** acetaminophen **OR** acetaminophen  •  bisacodyl  •  dextrose  •  dextrose  •  glucagon (human recombinant)  •  HYDROcodone-acetaminophen  •  ipratropium-albuterol  •  nitroglycerin  •  ondansetron **OR** ondansetron  •  phenol  •  prochlorperazine  •  sennosides-docusate  •  [COMPLETED] Insert peripheral IV **AND** sodium chloride  •  sodium chloride  •  sodium chloride    Assessment & Plan   Assessment & Plan     Active Hospital Problems    Diagnosis  POA   • **Sepsis ([facility]) [A41.9]  Yes     Priority: Medium   • Cellulitis in diabetic foot ([facility]) [E11.628, L03.119]  Yes     Priority: Medium   • Acute renal failure (ARF) ([facility]) [N17.9]  Yes   • Acute systolic heart failure ([facility]) [I50.21]  No   • DVT, bilateral lower limbs ([facility]) [I82.403]  No   • Acute respiratory failure with hypoxia ([facility]) [J96.01]  No   • Proximal phalanx fracture of the second digit extending into the second metatarsal joint [S92.919A]  Yes   • Dislocation of left shoulder joint, initial encounter [S43.005A]  Yes   • Fall [W19.XXXA]  Yes   • Lactic acidosis [E87.2]  Yes   • SSS (sick sinus syndrome) ([facility]) [I49.5]  Yes   • Hyperlipidemia [E78.5]  Yes   • Hypertension [I10]  Yes   • S/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 Au [Z95.1]  Not Applicable   • Hypothyroidism (acquired) [E03.9]  Yes   • Type 2 diabetes mellitus (HCC) [E11.9]  Yes      Resolved Hospital Problems   No resolved problems to display.        Brief Hospital Course to date:  Jermaine Singh is a 77 y.o. male  with PMH significant for HTN, HLD, CAD s/p CABG, PVD s/p RLE stent, CKD III, insulin-dependent DMII and hypothyroidism. He was admitted to [facility] 7/27/22 for L shoulder dislocation after a fall as well as sepsis secondary to L great toe osteomyelitis.     L great toe cellulitis / osteomyelitis  Peripheral vascular disease   - Followed by Dr. Joseph of podiatry - was on PO Doxycycline outpatient  - Bone scan was concerning for osteomyelitis of L great toe, now s/p L great toe amputation by Dr. Márquez 8/2/22   - arterial duplex obtained. Cardiology did not recommend revascularization.  - tissue culture growing enterococcus faecalis and enterococcus casseliflavus  - ID following - changed to Unasyn, will need 6 weeks from 8/2/22 (9/13/2022)  - PICC in place    Acute Hypoxic Respiratory Failure  Acute Systolic CHF  Elevated troponin  -Personally viewed last chest x-ray and previous chest CT which seems consistent with volume overload.  -Has some mild acute systolic heart failure with EF of 46 to 50%  -Status post 2 mg of IV Bumex on 8/13 with good response and was able to be weaned off of high flow to nasal cannula  -Would like to give further diuresis but will discuss with nephrology today given continued elevation of creatinine  -VQ scan was read as low probability of PE  -Minimal troponin elevation peaked at 0.230, cardiology following and feels it is related to demand ischemia    Bilateral LE DVT's  -Initially placed on heparin drip which has now been transitioned to Eliquis  -VQ scan low probability     Fall   Anterior L shoulder dislocation  - s/p reduction in the ED  - Appreciate ortho (Dr. Gipson) assistance   - Recommend non-operative treatment. Sling for comfort.   - PT for range of motion exercises, avoid Abduction and external rotation. WBAT on LUE with walker or knee scooter  - Follow up with Dr. Gipson in 2-3 weeks     Constipation  Nausea/Vomiting  -Some bowel movement yesterday,  continue bowel regimen     Insulin-dependent DMII  - Hgb A1c 9.0%  - Worsening last 24 hours after steroid dose on 8/14, will increase basal and sliding scale insulin and consider decreasing in 24 to 48 hours     Acute renal failure  - Baseline creatinine appears to be 0.9-1.2  -Creatinine continues to trend up and is worse at 2.16 today.  Nephrology consulted on 8/14.  Could be related to antibiotics.  Renal ultrasound and artery duplex pending today along with other work-up.    Hypertension  Coronary artery disease s/p CABG  SSS s/p PPM   - Cardiology has stopped ACEI and added Amlodipine 10mg daily   - Continue Metoprolol XL 50mg daily   - scheduled for 12/28/2022 1:30 pm with device check and should keep that appt.     Hypothyroid  - Continue levothyroxine    [facility]  Expected Discharge Date: 8/18/22    DVT prophylaxis:  Medical and mechanical DVT prophylaxis orders are present.     AM-PAC 6 Clicks Score (PT): 12 (08/14/22 2457)     CODE STATUS:   Code Status and Medical Interventions:   Ordered at: 07/28/22 0000     Code Status (Patient has no pulse and is not breathing):    CPR (Attempt to Resuscitate)     Medical Interventions (Patient has pulse or is breathing):    Full Support       Hayder Vogel MD  08/15/22

## 2022-08-15 NOTE — CASE MANAGEMENT/SOCIAL WORK
Continued Stay Note  [facility]     Patient Name: Jermaine Singh  MRN: [id number]  Today's Date: 8/15/2022    Admit Date: 7/27/2022     Discharge Plan     Row Name 08/15/22 1826       Plan    Plan [facility]    Plan Comments Case mgt f/u. D/C plan remains to go to [facility] when medically ready. [facility] still following progress               Discharge Codes    No documentation.               Expected Discharge Date and Time     Expected Discharge Date Expected Discharge Time    Aug 18, 2022             Sonja C Kellerman, RN

## 2022-08-16 ENCOUNTER — APPOINTMENT (OUTPATIENT)
Dept: GENERAL RADIOLOGY | Facility: HOSPITAL | Age: 77
End: 2022-08-16

## 2022-08-16 ENCOUNTER — APPOINTMENT (OUTPATIENT)
Dept: CARDIOLOGY | Facility: HOSPITAL | Age: 77
End: 2022-08-16

## 2022-08-16 LAB
ANA SER QL IF: NEGATIVE
BH CV ECHO MEAS - DIST REN A EDV LEFT: 14.6 CM/S
BH CV ECHO MEAS - DIST REN A PSV LEFT: 97.1 CM/S
BH CV ECHO MEAS - MID REN A EDV LEFT: 15.4 CM/S
BH CV ECHO MEAS - MID REN A PSV LEFT: 110 CM/S
BH CV ECHO MEAS - PROX REN A EDV LEFT: 13.9 CM/S
BH CV ECHO MEAS - PROX REN A PSV LEFT: 122 CM/S
BH CV VAS BP RIGHT ARM: NORMAL MMHG
BH CV VAS KIDNEY HEIGHT LEFT: 4.6 CM
BH CV VAS RENAL AORTIC MID EDV: 2.06 CM/S
BH CV VAS RENAL AORTIC MID PSV: 71.6 CM/S
BH CV VAS RENAL HILUM LEFT EDV: 18.5 CM/S
BH CV VAS RENAL HILUM LEFT PSV: 88.6 CM/S
BH CV VAS RENAL HILUM RIGHT EDV: 9.8 CM/S
BH CV VAS RENAL HILUM RIGHT PSV: 64.5 CM/S
BH CV XLRA MEAS - KID L LEFT: 11 CM
BH CV XLRA MEAS DIST REN A EDV RIGHT: 16.6 CM/S
BH CV XLRA MEAS DIST REN A PSV RIGHT: 127 CM/S
BH CV XLRA MEAS KID H RIGHT: 5 CM
BH CV XLRA MEAS KID L RIGHT: 12 CM
BH CV XLRA MEAS KID W RIGHT: 6.5 CM
BH CV XLRA MEAS MID REN A EDV RIGHT: 14.6 CM/S
BH CV XLRA MEAS MID REN A PSV RIGHT: 117 CM/S
BH CV XLRA MEAS PROX REN A EDV RIGHT: 7.8 CM/S
BH CV XLRA MEAS PROX REN A PSV RIGHT: 77.4 CM/S
BH CV XLRA MEAS RAR LEFT: 1.7
BH CV XLRA MEAS RAR RIGHT: 2.6
BH CV XLRA MEAS RENAL A ORG EDV LEFT: 13.9 CM/S
BH CV XLRA MEAS RENAL A ORG EDV RIGHT: 42.8 CM/S
BH CV XLRA MEAS RENAL A ORG PSV LEFT: 118 CM/S
BH CV XLRA MEAS RENAL A ORG PSV RIGHT: 187 CM/S
GLUCOSE BLDC GLUCOMTR-MCNC: 150 MG/DL (ref 70–130)
GLUCOSE BLDC GLUCOMTR-MCNC: 162 MG/DL (ref 70–130)
GLUCOSE BLDC GLUCOMTR-MCNC: 166 MG/DL (ref 70–130)
GLUCOSE BLDC GLUCOMTR-MCNC: 268 MG/DL (ref 70–130)
LEFT KIDNEY WIDTH: 5.6 CM

## 2022-08-16 PROCEDURE — 99024 POSTOP FOLLOW-UP VISIT: CPT | Performed by: THORACIC SURGERY (CARDIOTHORACIC VASCULAR SURGERY)

## 2022-08-16 PROCEDURE — 25010000002 AMPICILLIN-SULBACTAM PER 1.5 G: Performed by: PHYSICIAN ASSISTANT

## 2022-08-16 PROCEDURE — 63710000001 INSULIN LISPRO (HUMAN) PER 5 UNITS: Performed by: INTERNAL MEDICINE

## 2022-08-16 PROCEDURE — 94799 UNLISTED PULMONARY SVC/PX: CPT

## 2022-08-16 PROCEDURE — 82962 GLUCOSE BLOOD TEST: CPT

## 2022-08-16 PROCEDURE — 97110 THERAPEUTIC EXERCISES: CPT

## 2022-08-16 PROCEDURE — 25010000002 METHYLPREDNISOLONE PER 125 MG: Performed by: NURSE PRACTITIONER

## 2022-08-16 PROCEDURE — 63710000001 INSULIN DETEMIR PER 5 UNITS: Performed by: INTERNAL MEDICINE

## 2022-08-16 PROCEDURE — 99233 SBSQ HOSP IP/OBS HIGH 50: CPT | Performed by: INTERNAL MEDICINE

## 2022-08-16 PROCEDURE — 93975 VASCULAR STUDY: CPT

## 2022-08-16 PROCEDURE — 71045 X-RAY EXAM CHEST 1 VIEW: CPT

## 2022-08-16 PROCEDURE — 25010000002 AMPICILLIN-SULBACTAM PER 1.5 G

## 2022-08-16 PROCEDURE — 25010000002 FUROSEMIDE PER 20 MG: Performed by: INTERNAL MEDICINE

## 2022-08-16 PROCEDURE — 97535 SELF CARE MNGMENT TRAINING: CPT

## 2022-08-16 PROCEDURE — 94664 DEMO&/EVAL PT USE INHALER: CPT

## 2022-08-16 RX ORDER — METHYLPREDNISOLONE SODIUM SUCCINATE 125 MG/2ML
125 INJECTION, POWDER, LYOPHILIZED, FOR SOLUTION INTRAMUSCULAR; INTRAVENOUS ONCE
Status: COMPLETED | OUTPATIENT
Start: 2022-08-16 | End: 2022-08-16

## 2022-08-16 RX ORDER — FUROSEMIDE 10 MG/ML
40 INJECTION INTRAMUSCULAR; INTRAVENOUS ONCE
Status: COMPLETED | OUTPATIENT
Start: 2022-08-16 | End: 2022-08-16

## 2022-08-16 RX ORDER — INSULIN LISPRO 100 [IU]/ML
0-9 INJECTION, SOLUTION INTRAVENOUS; SUBCUTANEOUS
Status: DISCONTINUED | OUTPATIENT
Start: 2022-08-16 | End: 2022-08-26 | Stop reason: HOSPADM

## 2022-08-16 RX ADMIN — POLYETHYLENE GLYCOL 3350 17 G: 17 POWDER, FOR SOLUTION ORAL at 10:59

## 2022-08-16 RX ADMIN — INSULIN LISPRO 6 UNITS: 100 INJECTION, SOLUTION INTRAVENOUS; SUBCUTANEOUS at 17:06

## 2022-08-16 RX ADMIN — SODIUM CHLORIDE 3 G: 900 INJECTION INTRAVENOUS at 11:00

## 2022-08-16 RX ADMIN — METHYLPREDNISOLONE SODIUM SUCCINATE 125 MG: 125 INJECTION, POWDER, FOR SOLUTION INTRAMUSCULAR; INTRAVENOUS at 05:41

## 2022-08-16 RX ADMIN — INSULIN LISPRO 2 UNITS: 100 INJECTION, SOLUTION INTRAVENOUS; SUBCUTANEOUS at 11:52

## 2022-08-16 RX ADMIN — SIMETHICONE 80 MG: 80 TABLET, CHEWABLE ORAL at 05:41

## 2022-08-16 RX ADMIN — APIXABAN 10 MG: 5 TABLET, FILM COATED ORAL at 10:59

## 2022-08-16 RX ADMIN — METOPROLOL SUCCINATE 50 MG: 50 TABLET, EXTENDED RELEASE ORAL at 10:59

## 2022-08-16 RX ADMIN — SODIUM CHLORIDE 3 G: 900 INJECTION INTRAVENOUS at 00:27

## 2022-08-16 RX ADMIN — Medication 10 ML: at 09:00

## 2022-08-16 RX ADMIN — APIXABAN 10 MG: 5 TABLET, FILM COATED ORAL at 21:01

## 2022-08-16 RX ADMIN — Medication 10 ML: at 21:01

## 2022-08-16 RX ADMIN — Medication 10 ML: at 11:20

## 2022-08-16 RX ADMIN — IPRATROPIUM BROMIDE AND ALBUTEROL SULFATE 3 ML: .5; 3 SOLUTION RESPIRATORY (INHALATION) at 20:05

## 2022-08-16 RX ADMIN — SENNOSIDES AND DOCUSATE SODIUM 2 TABLET: 50; 8.6 TABLET ORAL at 11:00

## 2022-08-16 RX ADMIN — SODIUM CHLORIDE 3 G: 900 INJECTION INTRAVENOUS at 21:01

## 2022-08-16 RX ADMIN — IPRATROPIUM BROMIDE AND ALBUTEROL SULFATE 3 ML: .5; 3 SOLUTION RESPIRATORY (INHALATION) at 02:31

## 2022-08-16 RX ADMIN — IPRATROPIUM BROMIDE AND ALBUTEROL SULFATE 3 ML: .5; 3 SOLUTION RESPIRATORY (INHALATION) at 15:21

## 2022-08-16 RX ADMIN — INSULIN DETEMIR 24 UNITS: 100 INJECTION, SOLUTION SUBCUTANEOUS at 21:01

## 2022-08-16 RX ADMIN — Medication 5000 UNITS: at 11:00

## 2022-08-16 RX ADMIN — ASPIRIN 81 MG CHEWABLE TABLET 81 MG: 81 TABLET CHEWABLE at 10:59

## 2022-08-16 RX ADMIN — AMLODIPINE BESYLATE 10 MG: 10 TABLET ORAL at 10:59

## 2022-08-16 RX ADMIN — CETIRIZINE HYDROCHLORIDE 5 MG: 10 TABLET, FILM COATED ORAL at 11:00

## 2022-08-16 RX ADMIN — Medication 10 ML: at 11:21

## 2022-08-16 RX ADMIN — FUROSEMIDE 40 MG: 10 INJECTION, SOLUTION INTRAMUSCULAR; INTRAVENOUS at 11:26

## 2022-08-16 NOTE — THERAPY TREATMENT NOTE
Patient Name: Jermaine Singh  : 1945    MRN: [id number]                              Today's Date: 2022       Admit Date: 2022    Visit Dx:     ICD-10-CM ICD-9-CM   1. Dislocation of left shoulder joint, initial encounter  S43.005A 831.00   2. Cellulitis of left foot  L03.116 682.7   3. 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  Preserved LV function   • Type 2 diabetes mellitus (HCC)   • Hypertension   • Hyperlipidemia   • Hypothyroidism (acquired)   • Vitamin D deficiency on Rx    • Serum Cr 1.32 on admission.  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 Au   • Dizziness   • Atherosclerosis of native artery of both lower extremities with intermittent claudication ([facility])   • SSS (sick sinus syndrome) ([facility])   • Dislocation of left shoulder joint, initial encounter   • Cellulitis in diabetic foot ([facility])   • Fall   • Sepsis ([facility])   • Lactic acidosis   • Proximal phalanx fracture of the second digit extending into the second metatarsal joint   • Acute renal failure (ARF) ([facility])   • Acute systolic heart failure ([facility])   • DVT, bilateral lower limbs ([facility])   • Acute respiratory failure with hypoxia ([facility])     Past Medical History:   Diagnosis Date   • Asthma    • Coronary artery disease    • Diabetes mellitus (HCC)     started on [place] 2018; started on po meds in ; checking blood sugars daily    • Disease of thyroid gland     po meds daily for hypothyroidism    • History of fracture as a child     rt leg- severe    • Hyperlipidemia    • Hypertension    • Hypothyroidism    • Peripheral neuropathy    • Peripheral vascular disease (HCC)     s/p angiogram -needs stent in left leg    • RBBB    • Right knee pain    • Vitamin D deficiency      Past Surgical History:   Procedure Laterality Date   • APPENDECTOMY     • CARDIAC  CATHETERIZATION N/A 2/14/2019    Procedure: Left Heart Cath;  Surgeon: Cooper Apodaca MD;  Location:  [facility] CATH INVASIVE LOCATION;  Service: Cardiology   • CARDIAC ELECTROPHYSIOLOGY PROCEDURE N/A 5/18/2022    Procedure: DEVICE IMPLANT;  Surgeon: Cooper Apodaca MD;  Location:  [facility] CATH INVASIVE LOCATION;  Service: Cardiology;  Laterality: N/A;   • COLONOSCOPY     • CORONARY ARTERY BYPASS GRAFT N/A 3/22/2019    Procedure: MEDIAN STERNOTOMY, CORONARY ARTERY BYPASS GRAFT X3, UTILIZING THE LEFT INTERNAL MAMMARY ARTERY, EVH AND OPEN HARVEST OF THE RIGHT GREATER SAPHENOUS VEIN, EXPLORATION OF THE LEFT LEG;  Surgeon: Ap Au MD;  Location:  [facility] OR;  Service: Cardiothoracic   • EYE SURGERY Bilateral     cataracts    • INTERVENTIONAL RADIOLOGY PROCEDURE N/A 7/29/2021    Procedure: Abdominal Aortagram with Runoff;  Surgeon: Jermaine Hernandez MD;  Location:  [facility]ATH INVAS[facility];  Service: Cardiovascular;  Laterality: N/A;   • KNEE ARTHROSCOPY      right x 2, left x 1   • LACERATION REPAIR      right leg   • LEG SURGERY      2 for fracture of rt leg    • TONSILLECTOMY      Adnoidectomy   • TRANS METATARSAL AMPUTATION Left 8/2/2022    Procedure: GREAT TOE AMPUTATION LEFT;  Surgeon: Gopal Márquez MD;  Location:  [facility];  Service: Vascular;  Laterality: Left;      General Information     Row Name 08/16/22 1541          OT Time and Intention    Document Type therapy note (daily note)  -HK     Mode of Treatment occupational therapy  -     Row Name 08/16/22 6685          General Information    Patient Profile Reviewed yes  -HK     Existing Precautions/Restrictions fall;left;non-weight bearing;other (see comments);oxygen therapy device and L/min  L UE WBAT; NO ER OR ABDuction. NWB L LE.  -HK     Barriers to Rehab previous functional deficit;medically complex;ineffective coping  -     Row Name 08/16/22 3023          Cognition    Orientation Status (Cognition) oriented x 4  -HK     Row  Name 08/16/22 1540          Safety Issues, Functional Mobility    Safety Issues Affecting Function (Mobility) safety precautions follow-through/compliance;safety precaution awareness;insight into deficits/self-awareness;awareness of need for assistance;judgment;sequencing abilities  -     Impairments Affecting Function (Mobility) endurance/activity tolerance;pain;range of motion (ROM);strength;shortness of breath  -HK     Cognitive Impairments, Mobility Safety/Performance attention;awareness, need for assistance;insight into deficits/self-awareness;problem-solving/reasoning;judgment;sequencing abilities;safety precaution awareness;safety precaution follow-through  -           User Key  (r) = Recorded By, (t) = Taken By, (c) = Cosigned By    Initials Name Provider Type    HK Thelma Jackson, OT Occupational Therapist                 Mobility/ADL's     Row Name 08/16/22 1541          Bed Mobility    Comment, (Bed Mobility) Received and left UIC.  -     Row Name 08/16/22 1541          Activities of Daily Living    BADL Assessment/Intervention grooming  -     Row Name 08/16/22 1541          Mobility    Extremity Weight-bearing Status left upper extremity;left lower extremity  -     Left Upper Extremity (Weight-bearing Status) weight-bearing as tolerated (WBAT)  -     Left Lower Extremity (Weight-bearing Status) non weight-bearing (NWB)   -     Row Name 08/16/22 1541          Self-Feeding Assessment/Training    [place] Level (Feeding) liquids to mouth;set up;scoop food and bring to mouth;maximum assist (25% patient effort);prepare tray/open items;dependent (less than 25% patient effort)  -     Position (Self-Feeding) supported sitting  -HK     Comment, (Feeding) Pt dependent for all tray set up. MaxA to scoop food and bring to mouth. OT issued T handle cup and large handle utensils and educated pt on use. Pt easily fatiged with self feedign and requires assist for good intake. Reports improvement in  liquids to mouth with use of T handle cup.  -[facility] Name 08/16/22 1541          Grooming Assessment/Training    Comment, (Grooming) Declined due to fatigue.  -           User Key  (r) = Recorded By, (t) = Taken By, (c) = Cosigned By    Initials Name Provider Type    Thelma Phelps, OT Occupational Therapist               Obj/Interventions     [facility] Name 08/16/22 1543          Sensory Assessment (Somatosensory)    Sensory Assessment (Somatosensory) sensation intact  -[facility] Name 08/16/22 1543          Shoulder (Therapeutic Exercise)    Shoulder (Therapeutic Exercise) AAROM (active assistive range of motion)  -     Shoulder AAROM (Therapeutic Exercise) left;flexion;extension;10 repetitions  -[facility] Name 08/16/22 1543          Elbow/Forearm (Therapeutic Exercise)    Elbow/Forearm (Therapeutic Exercise) AROM (active range of motion)  -     Elbow/Forearm AROM (Therapeutic Exercise) left;flexion;extension;supination;pronation;10 repetitions  -HK     Row Name 08/16/22 1543          Wrist (Therapeutic Exercise)    Wrist (Therapeutic Exercise) AROM (active range of motion)  -     Wrist AROM (Therapeutic Exercise) left;flexion;extension;10 repetitions  -HK     Row Name 08/16/22 1543          Hand (Therapeutic Exercise)    Hand (Therapeutic Exercise) AROM (active range of motion)  -     Hand AROM/AAROM (Therapeutic Exercise) left;AROM (active range of motion);finger flexion;finger extension  -[facility] Name 08/16/22 1543          Motor Skills    Therapeutic Exercise shoulder;elbow/forearm;wrist;hand  -           User Key  (r) = Recorded By, (t) = Taken By, (c) = Cosigned By    Initials Name Provider Type    Thelma Phelps, OT Occupational Therapist               Goals/Plan    No documentation.                Clinical Impression     [facility] Name 08/16/22 1544          Pain Scale: FACES Pre/Post-Treatment    Pain: FACES Scale, Pretreatment 2-->hurts little bit  -     Posttreatment Pain Rating 2-->hurts  little bit  -HK     Pain Location - Side/Orientation Left  -HK     Pain Location generalized  -HK     Pain Location - shoulder  -HK     Row Name 08/16/22 1544          Plan of Care Review    Plan of Care Reviewed With patient  -HK     Progress improving  -HK     Outcome Evaluation Pt continues to be limited by fatigue and becomes fatigued with minimal exertion. Pt completed AAROM shoulder and AROM x10 reps elbow/wrist/hand. OT issued large  utensils and T handle cup and educated pt on use. Pt maxA for feeding this date due to fatigue and per pt he has had decreased intake due to fatigue from feeding. Pt does report improvement with use of T handle cup for liquids mouth. Recommend d/c to IPR when medically ready.  -     Row Name 08/16/22 1544          Therapy Assessment/Plan (OT)    Rehab Potential (OT) fair, will monitor progress closely  -HK     Criteria for Skilled Therapeutic Interventions Met (OT) yes;meets criteria;skilled treatment is necessary  -HK     Therapy Frequency (OT) daily  -     Row Name 08/16/22 1544          Therapy Plan Review/Discharge Plan (OT)    Anticipated Discharge Disposition (OT) skilled nursing facility  -     Row Name 08/16/22 1544          Vital Signs    Pre Systolic BP Rehab --  RN cleared for tx; VSS  -HK     O2 Delivery Pre Treatment supplemental O2  -HK     O2 Delivery Intra Treatment supplemental O2  -HK     O2 Delivery Post Treatment supplemental O2  -HK     Pre Patient Position Supine  -HK     Intra Patient Position Sitting  -HK     Post Patient Position Sitting  -HK     Row Name 08/16/22 1544          Positioning and Restraints    Pre-Treatment Position sitting in chair/recliner  -HK     Post Treatment Position chair  -HK     In Chair notified nsg;reclined;call light within reach;encouraged to call for assist;exit alarm on  -HK           User Key  (r) = Recorded By, (t) = Taken By, (c) = Cosigned By    Initials Name Provider Type    Thelma Phelps, OT Occupational  Therapist               Outcome Measures     Row Name 08/16/22 1546          How much help from another is currently needed...    Putting on and taking off regular lower body clothing? 1  -HK     Bathing (including washing, rinsing, and drying) 2  -HK     Toileting (which includes using toilet bed pan or urinal) 1  -HK     Putting on and taking off regular upper body clothing 2  -HK     Taking care of personal grooming (such as brushing teeth) 2  -HK     Eating meals 2  -HK     AM-PAC 6 Clicks Score (OT) 10  -HK     Row Name 08/16/22 0815          How much help from another person do you currently need...    Turning from your back to your side while in flat bed without using bedrails? 2  -MK     Moving from lying on back to sitting on the side of a flat bed without bedrails? 2  -MK     Moving to and from a bed to a chair (including a wheelchair)? 2  -MK     Standing up from a chair using your arms (e.g., wheelchair, bedside chair)? 2  -MK     Climbing 3-5 steps with a railing? 2  -MK     To walk in hospital room? 2  -MK     AM-PAC 6 Clicks Score (PT) 12  -MK     Highest level of mobility 4 --> Transferred to chair/commode  -     Row Name 08/16/22 1546          Functional Assessment    Outcome Measure Options AM-PAC 6 Clicks Daily Activity (OT)  -           User Key  (r) = Recorded By, (t) = Taken By, (c) = Cosigned By    Initials Name Provider Type    Maida Dee, RN Registered Nurse     Thelma Jackson, OT Occupational Therapist                Occupational Therapy Education                 Title: PT OT SLP Therapies (Done)     Topic: Occupational Therapy (Done)     Point: ADL training (Done)     Description:   Instruct learner(s) on proper safety adaptation and remediation techniques during self care or transfers.   Instruct in proper use of assistive devices.              Learning Progress Summary           Patient Acceptance, E,TB,D, VU,NR by  at 8/16/2022 1546   Family Acceptance, E, VU,NR by AR  at 8/15/2022 0959      Show all documentation for this point (11)                 Point: Home exercise program (Done)     Description:   Instruct learner(s) on appropriate technique for monitoring, assisting and/or progressing therapeutic exercises/activities.              Learning Progress Summary           Patient Acceptance, E,TB,D, VU,NR by  at 8/16/2022 1546   Family Acceptance, E, VU,NR by AR at 8/15/2022 0959      Show all documentation for this point (11)                 Point: Precautions (Done)     Description:   Instruct learner(s) on prescribed precautions during self-care and functional transfers.              Learning Progress Summary           Patient Acceptance, E,TB,D, VU,NR by  at 8/16/2022 1546   Family Acceptance, E, VU,NR by AR at 8/15/2022 0959      Show all documentation for this point (11)                 Point: Body mechanics (Done)     Description:   Instruct learner(s) on proper positioning and spine alignment during self-care, functional mobility activities and/or exercises.              Learning Progress Summary           Patient Acceptance, E,TB,D, VU,NR by  at 8/16/2022 1546   Family Acceptance, E, VU,NR by AR at 8/15/2022 0959      Show all documentation for this point (7)                             User Key     Initials Effective Dates Name Provider Type Discipline    AR 06/16/21 -  Nathalia Osborne, OT Occupational Therapist OT     06/16/21 -  Thelma Jackson OT Occupational Therapist OT              OT Recommendation and Plan  Therapy Frequency (OT): daily  Plan of Care Review  Plan of Care Reviewed With: patient  Progress: improving  Outcome Evaluation: Pt continues to be limited by fatigue and becomes fatigued with minimal exertion. Pt completed AAROM shoulder and AROM x10 reps elbow/wrist/hand. OT issued large  utensils and T handle cup and educated pt on use. Pt maxA for feeding this date due to fatigue and per pt he has had decreased intake due to fatigue from  feeding. Pt does report improvement with use of T handle cup for liquids mouth. Recommend d/c to IPR when medically ready.     Time Calculation:    Time Calculation- OT     Row Name 08/16/22 1439             Time Calculation- OT    OT Start Time 1439  -HK      OT Received On 08/16/22  -HK              Timed Charges    60234 - OT Therapeutic Exercise Minutes 10  -HK      26458 - OT Self Care/Mgmt Minutes 30  -HK              Total Minutes    Timed Charges Total Minutes 40  -HK       Total Minutes 40  -HK            User Key  (r) = Recorded By, (t) = Taken By, (c) = Cosigned By    Initials Name Provider Type    HK Thelma Jackson, OT Occupational Therapist              Therapy Charges for Today     Code Description Service Date Service Provider Modifiers Qty    22008214951 HC OT SELF CARE/MGMT/TRAIN EA 15 MIN 8/16/2022 Thelma Jackson OT GO 2    27187047627 HC OT THER PROC EA 15 MIN 8/16/2022 Thelma Jackson OT GO 1               Thelma Jackson OT  8/16/2022

## 2022-08-16 NOTE — PLAN OF CARE
Goal Outcome Evaluation:  Plan of Care Reviewed With: patient        Progress: improving  Outcome Evaluation: Pt continues to be limited by fatigue and becomes fatigued with minimal exertion. Pt completed AAROM shoulder and AROM x10 reps elbow/wrist/hand. OT issued large  utensils and T handle cup and educated pt on use. Pt maxA for feeding this date due to fatigue and per pt he has had decreased intake due to fatigue from feeding. Pt does report improvement with use of T handle cup for liquids mouth. Recommend d/c to IPR when medically ready.

## 2022-08-16 NOTE — PROGRESS NOTES
"   LOS: 20 days    Patient Care Team:  Farooq Painter MD as PCP - General (Family Medicine)  Jermaine Hernandez MD as Consulting Physician (Cardiology)    Chief Complaint:   77-year-old male was admitted with fall and left shoulder pain, also had left foot to infection has been treated with antibiotic.  Initial creatinine has been within acceptable range, patient received IV vancomycin on starting 7/27/2022, was continued until 8/4/2022 at that time the creatinine started to go up to 2.02 patient's antibiotic was changed, taken off the vancomycin at that time creatinine started to get improved 1.8 slowly over the last 3 days creatinine has been slowly creeping up.  Patient has a past medical history of CAD, CABG, hyperlipidemia, diabetes type 2, hypertension, hypothyroidism, peripheral vascular disease, sick sinus syndrome with pacemaker placement.  Further work-up of left foot showed osteomyelitis and was started on antibiotic as noted above.  He had denied any epistaxis, paresis, hematemesis, gross hematuria, kidney stone, nonsteroidal use, diarrhea, constipation, recent rash, no family history of kidney disease.  Today's labs show a creatinine 2.07 BUN of 51, albumin 2.9, white count 14.2, platelets 209 normal.  Renal been consulted for further work-up for [place].  Patient is on ampicillin IV, and IV Solu-Medrol 60 mg.  Subjective     Interval History:   Mild increase in creatinine noted.  Review of Systems:   Complain generalized weakness other than that all other systems are negative.    Objective     Vital Sign Min/Max for last 24 hours  Temp  Min: 97.2 °F (36.2 °C)  Max: 98.6 °F (37 °C)   BP  Min: 129/56  Max: 158/96   Pulse  Min: 66  Max: 83   Resp  Min: 16  Max: 20   SpO2  Min: 92 %  Max: 96 %   Flow (L/min)  Min: 2  Max: 4.5   Weight  Min: 116 kg (255 lb)  Max: 116 kg (255 lb)     Flowsheet Rows    Flowsheet Row First Filed Value   Admission Height 190.5 cm (75\") Documented at 07/27/2022 1848   Admission " Weight 98 kg (216 lb) Documented at 07/27/2022 1848          I/O this shift:  In: -   Out: 200 [Urine:200]  I/O last 3 completed shifts:  In: 100 [IV Piggyback:100]  Out: 575 [Urine:575]    Physical Exam:  General Appearance: Awake alert and oriented x3 no obvious distress comfortable in chair  Eyes: PER, EOMI.  Neck: Supple no JVD.  Lungs: Clear auscultation, no rales rhonchi's, equal chest movement, nonlabored.  Heart: No gallop, murmur, rub, RRR.  Abdomen: Soft, nontender, positive bowel sounds, no organomegaly.  Extremities: No edema, no cyanosis.  Left toe in bandage  Neuro: No focal deficit, moving all extremities, alert oriented X 3   no suprapubic fullness or tenderness  WBC WBC   Date Value Ref Range Status   08/15/2022 9.63 3.40 - 10.80 10*3/mm3 Final   08/13/2022 14.07 (H) 3.40 - 10.80 10*3/mm3 Final      HGB Hemoglobin   Date Value Ref Range Status   08/15/2022 10.6 (L) 13.0 - 17.7 g/dL Final   08/13/2022 10.2 (L) 13.0 - 17.7 g/dL Final      HCT Hematocrit   Date Value Ref Range Status   08/15/2022 32.6 (L) 37.5 - 51.0 % Final   08/13/2022 31.2 (L) 37.5 - 51.0 % Final      Platlets No results found for: LABPLAT   MCV MCV   Date Value Ref Range Status   08/15/2022 90.1 79.0 - 97.0 fL Final   08/13/2022 89.4 79.0 - 97.0 fL Final          Sodium Sodium   Date Value Ref Range Status   08/15/2022 138 136 - 145 mmol/L Final   08/13/2022 139 136 - 145 mmol/L Final      Potassium Potassium   Date Value Ref Range Status   08/15/2022 4.9 3.5 - 5.2 mmol/L Final     Comment:     Slight hemolysis detected by analyzer. Results may be affected.   08/13/2022 4.0 3.5 - 5.2 mmol/L Final      Chloride Chloride   Date Value Ref Range Status   08/15/2022 97 (L) 98 - 107 mmol/L Final   08/13/2022 99 98 - 107 mmol/L Final      CO2 CO2   Date Value Ref Range Status   08/15/2022 27.0 22.0 - 29.0 mmol/L Final   08/13/2022 27.0 22.0 - 29.0 mmol/L Final      BUN BUN   Date Value Ref Range Status   08/15/2022 64 (H) 8 - 23 mg/dL  Final   08/13/2022 51 (H) 8 - 23 mg/dL Final      Creatinine Creatinine   Date Value Ref Range Status   08/15/2022 2.16 (H) 0.76 - 1.27 mg/dL Final   08/13/2022 2.07 (H) 0.76 - 1.27 mg/dL Final      Calcium Calcium   Date Value Ref Range Status   08/15/2022 8.8 8.6 - 10.5 mg/dL Final   08/13/2022 8.5 (L) 8.6 - 10.5 mg/dL Final      PO4 No results found for: CAPO4   Albumin Albumin   Date Value Ref Range Status   08/15/2022 3.60 3.50 - 5.20 g/dL Final   08/13/2022 2.90 (L) 3.50 - 5.20 g/dL Final      Magnesium Magnesium   Date Value Ref Range Status   08/13/2022 2.7 (H) 1.6 - 2.4 mg/dL Final      Uric Acid No results found for: URICACID        Results Review:     I reviewed the patient's new clinical results.    amLODIPine, 10 mg, Oral, Daily  ampicillin-sulbactam, 3 g, Intravenous, Q8H  apixaban, 10 mg, Oral, Q12H   Followed by  [START ON 8/21/2022] apixaban, 5 mg, Oral, Q12H  aspirin, 81 mg, Oral, Daily  cetirizine, 5 mg, Oral, Daily  insulin detemir, 24 Units, Subcutaneous, Nightly  insulin lispro, 0-9 Units, Subcutaneous, TID AC  levothyroxine, 50 mcg, Oral, Q AM  metoprolol succinate XL, 50 mg, Oral, Q24H  polyethylene glycol, 17 g, Oral, Daily  senna-docusate sodium, 2 tablet, Oral, BID  sodium chloride, 10 mL, Intravenous, Q12H  sodium chloride, 10 mL, Intravenous, Q12H  vitamin D3, 5,000 Units, Oral, Daily           Medication Review: Reviewed    Assessment & Plan       Sepsis (HCC)    Type 2 diabetes mellitus (HCC)    Hypertension    Hyperlipidemia    Hypothyroidism (acquired)    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 Au    SSS (sick sinus syndrome) (HCC)    Dislocation of left shoulder joint, initial encounter    Cellulitis in diabetic foot (HCC)    Fall    Lactic acidosis    Proximal phalanx fracture of the second digit extending into the second metatarsal joint    Acute renal failure (ARF) (HCC)    Acute systolic heart failure (HCC)    DVT, bilateral lower limbs (HCC)    Acute respiratory failure with  hypoxia (HCC)        1.  Acute kidney injury: Baseline creatinine 1.02-1.05.  Patient was admitted and was started on antibiotic for osteomyelitis.  He received vancomycin which was started on 7/27/2022 until 8/4/2022 due to acute rise in creatinine to 2.07.  Renal artery duplex negative for renal artery stenosis.  Ultrasound kidney showed normal size kidney right 11.6 cm, left 11.1 cm.  Normal bladder.  CK1 71, C4 complement 34, C3 complement 137, TRISH negative, urine eos negative, urine protein 57, urine creatinine 150,  2.  Osteomyelitis left toe.  3.  Type 2 diabetes.  4.  History of hypertension  5.  History of hyperlipidemia.  6.  CAD s/p CABG 3/22/2019 by Dr. Au's.  7.  Sick sinus syndrome.  8.  Proteinuria: Protein creatinine ratio 0.38 g  Recommendations plan.  Nonoliguric acute kidney failure acceptable urine output, will follow closely mild increase in creatinine  Normal ultrasound kidney without  any obstruction.  No renal artery stenosis.  Urine lab includes urine sodium, urine protein, urine creatinine.     Avoid nephrotoxic medications.  Keep systolic blood pressure greater than 100.  Check volume status.  Check labs in the morning.  Daily evaluation for renal replacement therapy will be done.  Adjust medication for the new GFR.  High risk patient multiple medical problems.    Daphne Lal MD  08/16/22  15:51 EDT

## 2022-08-16 NOTE — PLAN OF CARE
Goal Outcome Evaluation:  Plan of Care Reviewed With: patient        Progress: no change  Outcome Evaluation: Up to chair and tolerates well. O2 at 4 - 4.5 liters per NC. Glucoses 162, 150, and 268. SUMMER PICC intact. Remains sinus on the monitor,. with a wide QRS. Renal Artery Duplex completed this morning. Lasix given today IV. Lungs with expiratory wheezes. Left great toe with dressing intact. Waiting a discharge plan when medically ready.

## 2022-08-16 NOTE — PLAN OF CARE
Goal Outcome Evaluation:  Plan of Care Reviewed With: patient        Progress: no change     Pt is alert and oriented X 4. VSS. 3-4 L NC. Spouse at bedside. Pt only void 100ml since the start of the shift. Bladder scan for 409 ml at 05 am. No urge to void at this time. NPO for procedure this morning.

## 2022-08-16 NOTE — PROGRESS NOTES
Cardiothoracic Surgery Progress Note      POD #: 14-left great toe transmetatarsal amputation primary closure     LOS: 20 days      Subjective: Awake and alert    Objective:  Vital Signs vital signs below noted T-max past 24 hours 90.6  Temp:  [97.2 °F (36.2 °C)-98.6 °F (37 °C)] 97.5 °F (36.4 °C)  Heart Rate:  [66-78] 68  Resp:  [16-18] 16  BP: (132-150)/(61-94) 150/94    Physical Exam:   General Appearance: Oriented x3   Lungs:   Heart:   Skin: Left second toe middle phalangeal joint space ulcers down to the bone.   Incision: Amputation site dressing change.  Suture intact skin margin viable skin flaps are viable     Results:  Results from last 7 days   Lab Units 08/15/22  0615   WBC 10*3/mm3 9.63   HEMOGLOBIN g/dL 10.6*   HEMATOCRIT % 32.6*   PLATELETS 10*3/mm3 199     Results from last 7 days   Lab Units 08/15/22  0615   SODIUM mmol/L 138   POTASSIUM mmol/L 4.9   CHLORIDE mmol/L 97*   CO2 mmol/L 27.0   BUN mg/dL 64*   CREATININE mg/dL 2.16*   GLUCOSE mg/dL 225*   CALCIUM mg/dL 8.8         Assessment: #1.  Postop day 14 left great toe transmetatarsal amputation primary closure healing well  2 status post remote coronary bypass grafting  3 status post remote pacemaker placement for sick sinus syndrome  4.  Recent fall with shoulder dislocation left shoulder reduced Emergency Department  5.  Endovascular angioplasty stenting right lower extremity per Dr. Hernandez 2019  6.  Pulmonary flash edema cardiac ischemic related  7.  Exposed middle phalangeal joint space left second toe from chronic diabetic ulceration and hammertoe phenomena.      Plan: Continue dressing changes amputation site.  Medical manage hospitalist cardiology.  Antibiotics infectious disease.  Renal blood flow study pending today due to increasing BUN and creatinine.      Gopal Márquez MD - 08/16/22 - 05:39 EDT

## 2022-08-16 NOTE — PROGRESS NOTES
"  [facility]  PROGRESS NOTE    Date of Admission: 7/27/2022  Date of Service: 08/16/22    Primary Care Physician: Farooq Painter MD    Chief Complaint: Following for HTN, Acute CHF    Subjective      Off floor for Renal Artery duplex.  No new overnight events.  Still requiring 3-4L O2.    Wife at bedside - reports patient did have a BM yesterday and feels better.  Minimal self voiding.    Objective   Vitals: /56 (BP Location: Right leg, Patient Position: Lying)   Pulse 69   Temp 97.6 °F (36.4 °C) (Oral)   Resp 17   Ht 190.5 cm (75\")   Wt 116 kg (255 lb)   SpO2 96%   BMI 31.87 kg/m²     Physical Exam:  Did not perform as patient was off floor for study.    Results:  Results from last 7 days   Lab Units 08/15/22  0615 08/13/22 2310 08/13/22  1224   WBC 10*3/mm3 9.63 14.07* 14.42*   HEMOGLOBIN g/dL 10.6* 10.2* 10.5*   HEMATOCRIT % 32.6* 31.2* 31.4*   PLATELETS 10*3/mm3 199 226 209     Results from last 7 days   Lab Units 08/15/22  0615 08/13/22  2310 08/13/22  0730   SODIUM mmol/L 138 139 143   POTASSIUM mmol/L 4.9 4.0 3.8   CHLORIDE mmol/L 97* 99 104   CO2 mmol/L 27.0 27.0 28.0   BUN mg/dL 64* 51* 51*   CREATININE mg/dL 2.16* 2.07* 1.94*   GLUCOSE mg/dL 225* 151* 63*      Lab Results   Component Value Date    CHOL 180 08/04/2022    TRIG 160 (H) 08/04/2022    HDL 39 (L) 08/04/2022     (H) 08/04/2022    AST 12 08/13/2022    ALT 13 08/13/2022         Results from last 7 days   Lab Units 08/13/22  1224 08/11/22  0023   PROTIME Seconds 13.8 13.6   INR  1.07 1.05   APTT seconds 29.7* 33.1*     Results from last 7 days   Lab Units 08/15/22  0615 08/13/22  2310 08/13/22  1224 08/13/22  0730   CK TOTAL U/L 171  --   --   --    TROPONIN T ng/mL  --  0.230* 0.229* 0.230*     Results from last 7 days   Lab Units 08/13/22  2310   PROBNP pg/mL 31,919.0*       Intake/Output Summary (Last 24 hours) at 8/16/2022 1016  Last data filed at 8/16/2022 0548  Gross per 24 hour "   Intake --   Output 500 ml   Net -500 ml       I personally reviewed the patient's EKG/Telemetry data    Radiology Data:   CXR 8/14/22:  IMPRESSION:     Increased patchy bibasilar opacities     Cardiomegaly and pulmonary vascular congestion.    Current Medications:  amLODIPine, 10 mg, Oral, Daily  ampicillin-sulbactam, 3 g, Intravenous, Q8H  apixaban, 10 mg, Oral, Q12H   Followed by  [START ON 8/21/2022] apixaban, 5 mg, Oral, Q12H  aspirin, 81 mg, Oral, Daily  cetirizine, 5 mg, Oral, Daily  insulin detemir, 30 Units, Subcutaneous, Nightly  insulin lispro, 0-14 Units, Subcutaneous, TID AC  levothyroxine, 50 mcg, Oral, Q AM  metoprolol succinate XL, 50 mg, Oral, Q24H  polyethylene glycol, 17 g, Oral, Daily  senna-docusate sodium, 2 tablet, Oral, BID  sodium chloride, 10 mL, Intravenous, Q12H  sodium chloride, 10 mL, Intravenous, Q12H  vitamin D3, 5,000 Units, Oral, Daily               ASSESSMENT:  PVDz with remote Soy iliac stenting, good collateral flow.  Osteomyelitis left Great toe  CAD  HTN  [place]  Acute CHF  Hypoxia  DMII  BLE DVTs      PLAN:  -  POD 14 p Left grt toe transmetatarsal amputation.  Continue PT/OT, antibiotics per ID.  Now on Ampicillin-sulbactam.  -  Mildly elevated troponin from demand ischemia with acute CHF. No acute EKG changes.  Continue Beta Blocker, CCB.  Repeat echo, EF likely closer to 40%.  -  Continued rise in Cr.  Patient needs diuresis, maybe even CRRT. Nephrology following and should manage diuresis.  -  Heparin transitioned to Eliquis per DVT protocol.      Megan Shukla PA-C working with Dr. Cooper Apodaca  08/15/2022  10:21 AM EDT

## 2022-08-16 NOTE — PROGRESS NOTES
[facility]  PROGRESS NOTE    Patient Name: Jermaine Singh  : 1945  MRN: [id number]    Date of Admission: 2022  Primary Care Physician: Farooq Painter MD    Subjective   Subjective     CC:  F/u osteomyelitis    HPI:  Wife and daughter present.  Continues to require 4L and feels SOB.  Not much toe pain.  + BM yesterday and improved RUQ pain.    ROS:  Gen- No fevers, chills  CV- + chest pain, palpitations  Resp- No cough, + dyspnea  GI- No N/V/D, abd pain    Objective   Objective     Vital Signs:   Temp:  [97.2 °F (36.2 °C)-98.6 °F (37 °C)] 97.6 °F (36.4 °C)  Heart Rate:  [66-83] 83  Resp:  [16-18] 17  BP: (129-158)/(56-96) 158/96  Flow (L/min):  [2-4.5] 4.5     Physical Exam:  Constitutional: mildly ill appearing, awake, alert, sitting up in chair  HENT: NCAT, mucous membranes moist  Respiratory: diminished bilaterally, respiratory effort normal on 4LNC, about the same  Cardiovascular: RRR, no murmurs, rubs, or gallops  Gastrointestinal: Positive bowel sounds, soft, non-tender, mild distention  Musculoskeletal: trace bilateral ankle edema, left foot bandaged, clean and dry  Psychiatric: Appropriate affect, cooperative  Neurologic: Oriented x 3, no deficits  Skin: No rashes          Results Reviewed:  LAB RESULTS:      Lab 08/15/22  0615 22  1132 22  0116 22  2310 22  1818 22  1224 22  0941 22  0636 22  0023 08/10/22  1638 08/10/22  1638   WBC 9.63  --   --  14.07*  --  14.42* 13.76* 11.75* 15.41*  --  15.79*   HEMOGLOBIN 10.6*  --   --  10.2*  --  10.5* 11.2* 11.0* 11.8*  --  11.7*   HEMATOCRIT 32.6*  --   --  31.2*  --  31.4* 35.5* 33.7* 35.7*  --  36.2*   PLATELETS 199  --   --  226  --  209 236 250 252  --  309   NEUTROS ABS  --   --   --  11.61*  --  12.08*  --  9.88* 13.43*  --   --    IMMATURE GRANS (ABS)  --   --   --  0.06*  --  0.05  --  0.06* 0.07*  --   --    LYMPHS ABS  --   --   --  1.13  --  1.06   --  0.76 0.60*  --   --    MONOS ABS  --   --   --  1.17*  --  1.17*  --  1.01* 1.26*  --   --    EOS ABS  --   --   --  0.04  --  0.01  --  0.00 0.00  --   --    MCV 90.1  --   --  89.4  --  88.2 93.4 90.6 89.0  --  90.5   PROCALCITONIN  --   --   --   --   --   --   --   --   --   --  0.17   LACTATE  --   --   --   --   --   --   --  1.2  --   --   --    PROTIME  --   --   --   --   --  13.8  --   --  13.6  --   --    APTT  --   --   --   --   --  29.7*  --   --  33.1*  --   --    HEPARIN ANTI-XA  --  0.10* 0.49  --  0.37 0.10*  --  0.10* 0.10*   < >  --     < > = values in this interval not displayed.         Lab 08/15/22  0615 08/13/22  2310 08/13/22  0730 08/12/22  1018 08/12/22  0941 08/11/22  0636 08/10/22  1638   SODIUM 138 139 143  --  146* 145 145   POTASSIUM 4.9 4.0 3.8  --  4.2 4.1 4.5   CHLORIDE 97* 99 104  --  103 106 108*   CO2 27.0 27.0 28.0  --  26.0 26.0 22.0   ANION GAP 14.0 13.0 11.0  --  17.0* 13.0 15.0   BUN 64* 51* 51*  --  47* 46* 43*   CREATININE 2.16* 2.07* 1.94*  --  1.84* 1.80* 1.82*   EGFR 30.8* 32.4* 35.0*  --  37.3* 38.3* 37.8*   GLUCOSE 225* 151* 63*  --  206* 185* 236*   CALCIUM 8.8 8.5* 8.4*  --  8.8 8.9 8.7   IONIZED CALCIUM  --   --   --  1.22  --   --   --    MAGNESIUM  --  2.7*  --   --  2.2 2.3 2.5*   PHOSPHORUS 5.5*  --   --   --  4.4  --   --          Lab 08/15/22  0615 08/13/22  2310   TOTAL PROTEIN  --  6.1   ALBUMIN 3.60 2.90*   GLOBULIN  --  3.2   ALT (SGPT)  --  13   AST (SGOT)  --  12   BILIRUBIN  --  0.3   ALK PHOS  --  60         Lab 08/13/22  2310 08/13/22  1224 08/13/22  0730 08/12/22  1653 08/12/22  1025 08/11/22  0636 08/11/22  0023 08/10/22  2239 08/10/22  1638   PROBNP 31,919.0*  --   --   --   --  30,799.0*  --   --  21,299.0*   TROPONIN T 0.230* 0.229* 0.230* 0.206* 0.157* 0.146*  --    < > 0.108*   PROTIME  --  13.8  --   --   --   --  13.6  --   --    INR  --  1.07  --   --   --   --  1.05  --   --     < > = values in this interval not displayed.                  Lab 08/11/22  0041 08/10/22  1741 08/10/22  1633   PH, ARTERIAL 7.422 7.382 7.262*   PCO2, ARTERIAL 38.6 40.3 55.7*   PO2 ART 67.1* 106.0 50.7*   FIO2 32 60 80   HCO3 ART 25.2 23.9 25.1   BASE EXCESS ART 0.8 -1.1* -2.6*   CARBOXYHEMOGLOBIN 0.9 1.0 1.4     Brief Urine Lab Results  (Last result in the past 365 days)      Color   Clarity   Blood   Leuk Est   Nitrite   Protein   CREAT   Urine HCG        08/14/22 1818             149.9               Microbiology Results Abnormal     Procedure Component Value - Date/Time    Eosinophil Smear - Urine, Urine, Clean Catch [[id number]]  (Normal) Collected: 08/14/22 1818    Lab Status: Final result Specimen: Urine, Clean Catch Updated: 08/14/22 2101     Eosinophil Smear 0 % EOS/100 Cells     Narrative:      No eosinophil seen    Anaerobic Culture - Tissue, Toe, Left [[id number]] Collected: 08/02/22 1027    Lab Status: Final result Specimen: Tissue from Toe, Left Updated: 08/07/22 0858     Anaerobic Culture No anaerobes isolated at 5 days    Blood Culture - Blood, Hand, Left [[id number]]  (Normal) Collected: 07/27/22 2040    Lab Status: Final result Specimen: Blood from Hand, Left Updated: 08/01/22 2132     Blood Culture No growth at 5 days    Blood Culture - Blood, Hand, Right [[id number]]  (Normal) Collected: 07/27/22 2030    Lab Status: Final result Specimen: Blood from Hand, Right Updated: 08/01/22 2132     Blood Culture No growth at 5 days    COVID PRE-OP / PRE-PROCEDURE SCREENING ORDER (NO ISOLATION) - Swab, Nasopharynx [[id number]]  (Normal) Collected: 07/27/22 2246    Lab Status: Final result Specimen: Swab from Nasopharynx Updated: 07/27/22 2357    Narrative:      The following orders were created for panel order COVID PRE-OP / PRE-PROCEDURE SCREENING ORDER (NO ISOLATION) - Swab, Nasopharynx.  Procedure                               Abnormality         Status                     ---------                               -----------         ------                      COVID-19 and FLU A/B PCR...[[id number]]  Normal              Final result                 Please view results for these tests on the individual orders.    COVID-19 and FLU A/B PCR - Swab, Nasopharynx [[id number]]  (Normal) Collected: 07/27/22 2246    Lab Status: Final result Specimen: Swab from Nasopharynx Updated: 07/27/22 3588     COVID19 Not Detected     Influenza A PCR Not Detected     Influenza B PCR Not Detected    Narrative:      Fact sheet for providers: https://www.fda.gov/media/304221/download    Fact sheet for patients: https://www.fda.gov/media/253650/download    Test performed by PCR.          XR Chest 1 View    Result Date: 8/16/2022  FRONTAL VIEW OF THE CHEST CLINICAL INDICATION: Dyspnea COMPARISON: August 14, 2022 FINDINGS: Lines: Left chest wall pacemaker. Intracardiac leads appear intact. Right upper extremity PICC with the tip near the superior cavoatrial junction. Interval increase in patchy airspace opacities, right greater than left. Left basilar opacity silhouetting left hemidiaphragm with possible left effusion. Heart size is stable. Osseous structures are unremarkable.     Impression: Increasing airspace opacities and left basilar opacity with likely left effusion. Findings may be related to worsening pneumonia or edema.  Support devices as above. Electronically signed by:  Shanel Molina  8/16/2022 4:06 AM Mountain Time    US Renal Bilateral    Result Date: 8/15/2022  DATE OF EXAM: 8/15/2022 12:11 PM  PROCEDURE: US RENAL BILATERAL-  INDICATIONS: Acute kidney injury; S43.005A-Unspecified dislocation of left shoulder joint, initial encounter; L03.116-Cellulitis of left lower limb; Z78.9-Other specified health status  COMPARISON: No comparisons available.  TECHNIQUE: Grayscale and color Doppler ultrasound evaluation of the kidneys and urinary bladder was performed.  FINDINGS: Sonographic grayscale and color Doppler evaluation of the kidneys demonstrates  Right kidney measures 11.6 cm in length,  without apparent mass or hydronephrosis. Normal color Doppler flow.  Left kidney measures 11.1 cm in length without apparent mass or hydronephrosis. Normal color Doppler flow.  Unremarkable urinary bladder      Impression: Normal sonographic appearance of the kidneys, without evidence of hydronephrosis.  This report was finalized on 8/15/2022 4:21 PM by Jermaine Wang.        Results for orders placed during the hospital encounter of 07/27/22    Adult Transthoracic Echo Complete W/ Cont if Necessary Per Protocol    Interpretation Summary  · Left ventricular ejection fraction appears to be 46 - 50%. Left ventricular systolic function is low normal.  · Left ventricular septal hypokinesis  · No significant valvular heart disease      I have reviewed the medications:  Scheduled Meds:amLODIPine, 10 mg, Oral, Daily  ampicillin-sulbactam, 3 g, Intravenous, Q8H  apixaban, 10 mg, Oral, Q12H   Followed by  [START ON 8/21/2022] apixaban, 5 mg, Oral, Q12H  aspirin, 81 mg, Oral, Daily  cetirizine, 5 mg, Oral, Daily  insulin detemir, 30 Units, Subcutaneous, Nightly  insulin lispro, 0-14 Units, Subcutaneous, TID AC  levothyroxine, 50 mcg, Oral, Q AM  metoprolol succinate XL, 50 mg, Oral, Q24H  polyethylene glycol, 17 g, Oral, Daily  senna-docusate sodium, 2 tablet, Oral, BID  sodium chloride, 10 mL, Intravenous, Q12H  sodium chloride, 10 mL, Intravenous, Q12H  vitamin D3, 5,000 Units, Oral, Daily      Continuous Infusions:   PRN Meds:.•  acetaminophen **OR** acetaminophen **OR** acetaminophen  •  bisacodyl  •  dextrose  •  dextrose  •  glucagon (human recombinant)  •  HYDROcodone-acetaminophen  •  ipratropium-albuterol  •  nitroglycerin  •  ondansetron **OR** ondansetron  •  phenol  •  prochlorperazine  •  sennosides-docusate  •  simethicone  •  [COMPLETED] Insert peripheral IV **AND** sodium chloride  •  sodium chloride  •  sodium chloride    Assessment & Plan   Assessment & Plan     Active Hospital Problems    Diagnosis  POA    • **Sepsis ([facility]) [A41.9]  Yes     Priority: Medium   • Cellulitis in diabetic foot ([facility]) [E11.628, L03.119]  Yes     Priority: Medium   • Acute renal failure (ARF) ([facility]) [N17.9]  Yes   • Acute systolic heart failure ([facility]) [I50.21]  No   • DVT, bilateral lower limbs ([facility]) [I82.403]  No   • Acute respiratory failure with hypoxia ([facility]) [J96.01]  No   • Proximal phalanx fracture of the second digit extending into the second metatarsal joint [S92.919A]  Yes   • Dislocation of left shoulder joint, initial encounter [S43.005A]  Yes   • Fall [W19.XXXA]  Yes   • Lactic acidosis [E87.2]  Yes   • SSS (sick sinus syndrome) ([facility]) [I49.5]  Yes   • Hyperlipidemia [E78.5]  Yes   • Hypertension [I10]  Yes   • S/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 Au [Z95.1]  Not Applicable   • Hypothyroidism (acquired) [E03.9]  Yes   • Type 2 diabetes mellitus ([facility]) [E11.9]  Yes      Resolved Hospital Problems   No resolved problems to display.        Brief Hospital Course to date:  Jermaine Singh is a 77 y.o. male with PMH significant for HTN, HLD, CAD s/p CABG, PVD s/p RLE stent, CKD III, insulin-dependent DMII and hypothyroidism. He was admitted to [facility] 7/27/22 for L shoulder dislocation after a fall as well as sepsis secondary to L great toe osteomyelitis.     L great toe cellulitis / osteomyelitis  Peripheral vascular disease   - Followed by Dr. Joseph of podiatry - was on PO Doxycycline outpatient  - Bone scan was concerning for osteomyelitis of L great toe, now s/p L great toe amputation by Dr. Márquez 8/2/22   - arterial duplex obtained. Cardiology did not recommend revascularization.  - tissue culture growing enterococcus faecalis and enterococcus casseliflavus  - ID following - changed to Unasyn, will need 6 weeks from 8/2/22 (9/13/2022)  - PICC in place  - remains stable today    Acute Hypoxic Respiratory Failure  Acute Systolic CHF  Elevated troponin  -Personally viewed CXR from this AM.  Looks to have increasing edema  and developing left effusion compared to previous.  -Has some mild acute systolic heart failure with EF of 46 to 50%, though cardiology note says more like 40% based on relook.  -Status post 2 mg of IV Bumex on 8/13 with good response and was able to be weaned off of high flow to nasal cannula  - Given worsening CXR appearance and continued oxygen appearance, will give one time dose of IV lasix today and see how he responds with close monitoring of renal function  -VQ scan was read as low probability of PE  -Minimal troponin elevation peaked at 0.230, cardiology following and feels it is related to demand ischemia       Acute renal failure  - Baseline creatinine appears to be 0.9-1.2  -Creatinine continues to trend up yesterday at 2.16, BMP is pending this AM  - renal US normal, artery duplex done this AM and is pending  - watch with diuresis as above  - appreciate renal assistance    Bilateral LE DVT's  -Initially placed on heparin drip which has now been transitioned to Eliquis  -VQ scan low probability     Fall   Anterior L shoulder dislocation  - s/p reduction in the ED  - Appreciate ortho (Dr. Gipson) assistance   - Recommend non-operative treatment. Sling for comfort.   - PT for range of motion exercises, avoid Abduction and external rotation. WBAT on LUE with walker or knee scooter  - Follow up with Dr. Gipson in 2-3 weeks   - stable today    Constipation  Nausea/Vomiting  -Some bowel movement yesterday, continue bowel regimen     Insulin-dependent DMII  - Hgb A1c 9.0%  - Got steroids on 8/14 so adjust insulin yesterday, borderline this AM  - will decrease basal dose and SSI, continue to adjust as needed    Hypertension  Coronary artery disease s/p CABG  SSS s/p PPM   - Cardiology has stopped ACEI and added Amlodipine 10mg daily   - Continue Metoprolol XL 50mg daily   - scheduled for 12/28/2022 1:30 pm with device check and should keep that appt.     Hypothyroid  - Continue levothyroxine    Expected  [facility] and [facility]  Expected Discharge Date: 8/19/22    DVT prophylaxis:  Medical and mechanical DVT prophylaxis orders are present.     AM-PAC 6 Clicks Score (PT): 12 (08/16/22 0815)     CODE STATUS:   Code Status and Medical Interventions:   Ordered at: 07/28/22 0000     Code Status (Patient has no pulse and is not breathing):    CPR (Attempt to Resuscitate)     Medical Interventions (Patient has pulse or is breathing):    Full Support       Hayder Vogel MD  08/16/22

## 2022-08-17 LAB
ANION GAP SERPL CALCULATED.3IONS-SCNC: 13 MMOL/L (ref 5–15)
BH CV LOW VAS LEFT LESSER SAPH VESSEL: 1
BH CV LOW VAS LEFT PERONEAL VESSEL: 1
BH CV LOW VAS RIGHT LESSER SAPH VESSEL: 1
BH CV LOW VAS RIGHT PERONEAL VESSEL: 1
BH CV LOW VAS RIGHT POSTERIOR TIBIAL VESSEL: 1
BH CV LOWER VASCULAR LEFT COMMON FEMORAL COMPRESS: NORMAL
BH CV LOWER VASCULAR LEFT COMMON FEMORAL PHASIC: NORMAL
BH CV LOWER VASCULAR LEFT COMMON FEMORAL SPONT: NORMAL
BH CV LOWER VASCULAR LEFT DISTAL FEMORAL COMPRESS: NORMAL
BH CV LOWER VASCULAR LEFT GASTRONEMIUS COMPRESS: NORMAL
BH CV LOWER VASCULAR LEFT GREATER SAPH AK COMPRESS: NORMAL
BH CV LOWER VASCULAR LEFT GREATER SAPH BK COMPRESS: NORMAL
BH CV LOWER VASCULAR LEFT LESSER SAPH COMPRESS: NORMAL
BH CV LOWER VASCULAR LEFT LESSER SAPH PHASIC: NORMAL
BH CV LOWER VASCULAR LEFT LESSER SAPH SPONT: NORMAL
BH CV LOWER VASCULAR LEFT MID FEMORAL AUGMENT: NORMAL
BH CV LOWER VASCULAR LEFT MID FEMORAL COMPRESS: NORMAL
BH CV LOWER VASCULAR LEFT MID FEMORAL PHASIC: NORMAL
BH CV LOWER VASCULAR LEFT MID FEMORAL SPONT: NORMAL
BH CV LOWER VASCULAR LEFT PERONEAL COMPRESS: NORMAL
BH CV LOWER VASCULAR LEFT POPLITEAL AUGMENT: NORMAL
BH CV LOWER VASCULAR LEFT POPLITEAL COMPRESS: NORMAL
BH CV LOWER VASCULAR LEFT POPLITEAL PHASIC: NORMAL
BH CV LOWER VASCULAR LEFT POPLITEAL SPONT: NORMAL
BH CV LOWER VASCULAR LEFT POSTERIOR TIBIAL COMPRESS: NORMAL
BH CV LOWER VASCULAR LEFT PROFUNDA FEMORAL COMPRESS: NORMAL
BH CV LOWER VASCULAR LEFT PROXIMAL FEMORAL COMPRESS: NORMAL
BH CV LOWER VASCULAR LEFT SAPHENOFEMORAL JUNCTION COMPRESS: NORMAL
BH CV LOWER VASCULAR RIGHT COMMON FEMORAL COMPRESS: NORMAL
BH CV LOWER VASCULAR RIGHT COMMON FEMORAL PHASIC: NORMAL
BH CV LOWER VASCULAR RIGHT COMMON FEMORAL SPONT: NORMAL
BH CV LOWER VASCULAR RIGHT DISTAL FEMORAL COMPRESS: NORMAL
BH CV LOWER VASCULAR RIGHT GASTRONEMIUS COMPRESS: NORMAL
BH CV LOWER VASCULAR RIGHT LESSER SAPH COMPRESS: NORMAL
BH CV LOWER VASCULAR RIGHT LESSER SAPH PHASIC: NORMAL
BH CV LOWER VASCULAR RIGHT LESSER SAPH SPONT: NORMAL
BH CV LOWER VASCULAR RIGHT MID FEMORAL AUGMENT: NORMAL
BH CV LOWER VASCULAR RIGHT MID FEMORAL COMPRESS: NORMAL
BH CV LOWER VASCULAR RIGHT MID FEMORAL PHASIC: NORMAL
BH CV LOWER VASCULAR RIGHT MID FEMORAL SPONT: NORMAL
BH CV LOWER VASCULAR RIGHT PERONEAL COMPRESS: NORMAL
BH CV LOWER VASCULAR RIGHT POPLITEAL AUGMENT: NORMAL
BH CV LOWER VASCULAR RIGHT POPLITEAL COMPRESS: NORMAL
BH CV LOWER VASCULAR RIGHT POPLITEAL PHASIC: NORMAL
BH CV LOWER VASCULAR RIGHT POPLITEAL SPONT: NORMAL
BH CV LOWER VASCULAR RIGHT POSTERIOR TIBIAL COMPRESS: NORMAL
BH CV LOWER VASCULAR RIGHT POSTERIOR TIBIAL PHASIC: NORMAL
BH CV LOWER VASCULAR RIGHT POSTERIOR TIBIAL SPONT: NORMAL
BH CV LOWER VASCULAR RIGHT PROFUNDA FEMORAL COMPRESS: NORMAL
BH CV LOWER VASCULAR RIGHT PROXIMAL FEMORAL COMPRESS: NORMAL
BH CV LOWER VASCULAR RIGHT SAPHENOFEMORAL JUNCTION COMPRESS: NORMAL
BUN SERPL-MCNC: 78 MG/DL (ref 8–23)
BUN/CREAT SERPL: 29.3 (ref 7–25)
CALCIUM SPEC-SCNC: 8.2 MG/DL (ref 8.6–10.5)
CHLORIDE SERPL-SCNC: 96 MMOL/L (ref 98–107)
CO2 SERPL-SCNC: 28 MMOL/L (ref 22–29)
CREAT SERPL-MCNC: 2.66 MG/DL (ref 0.76–1.27)
EGFRCR SERPLBLD CKD-EPI 2021: 24 ML/MIN/1.73
GLUCOSE BLDC GLUCOMTR-MCNC: 188 MG/DL (ref 70–130)
GLUCOSE BLDC GLUCOMTR-MCNC: 203 MG/DL (ref 70–130)
GLUCOSE BLDC GLUCOMTR-MCNC: 211 MG/DL (ref 70–130)
GLUCOSE BLDC GLUCOMTR-MCNC: 222 MG/DL (ref 70–130)
GLUCOSE SERPL-MCNC: 232 MG/DL (ref 65–99)
MAXIMAL PREDICTED HEART RATE: 143 BPM
POTASSIUM SERPL-SCNC: 4.6 MMOL/L (ref 3.5–5.2)
QT INTERVAL: 502 MS
QTC INTERVAL: 533 MS
SODIUM SERPL-SCNC: 137 MMOL/L (ref 136–145)
STRESS TARGET HR: 122 BPM

## 2022-08-17 PROCEDURE — 63710000001 INSULIN DETEMIR PER 5 UNITS: Performed by: INTERNAL MEDICINE

## 2022-08-17 PROCEDURE — 25010000002 AMPICILLIN-SULBACTAM PER 1.5 G

## 2022-08-17 PROCEDURE — 94761 N-INVAS EAR/PLS OXIMETRY MLT: CPT

## 2022-08-17 PROCEDURE — 94799 UNLISTED PULMONARY SVC/PX: CPT

## 2022-08-17 PROCEDURE — 99233 SBSQ HOSP IP/OBS HIGH 50: CPT | Performed by: INTERNAL MEDICINE

## 2022-08-17 PROCEDURE — 97535 SELF CARE MNGMENT TRAINING: CPT

## 2022-08-17 PROCEDURE — 97110 THERAPEUTIC EXERCISES: CPT

## 2022-08-17 PROCEDURE — 93010 ELECTROCARDIOGRAM REPORT: CPT | Performed by: INTERNAL MEDICINE

## 2022-08-17 PROCEDURE — P9612 CATHETERIZE FOR URINE SPEC: HCPCS

## 2022-08-17 PROCEDURE — 97530 THERAPEUTIC ACTIVITIES: CPT

## 2022-08-17 PROCEDURE — 82962 GLUCOSE BLOOD TEST: CPT

## 2022-08-17 PROCEDURE — 94664 DEMO&/EVAL PT USE INHALER: CPT

## 2022-08-17 PROCEDURE — 80048 BASIC METABOLIC PNL TOTAL CA: CPT | Performed by: INTERNAL MEDICINE

## 2022-08-17 PROCEDURE — 93005 ELECTROCARDIOGRAM TRACING: CPT | Performed by: INTERNAL MEDICINE

## 2022-08-17 PROCEDURE — 99024 POSTOP FOLLOW-UP VISIT: CPT | Performed by: THORACIC SURGERY (CARDIOTHORACIC VASCULAR SURGERY)

## 2022-08-17 PROCEDURE — 63710000001 INSULIN LISPRO (HUMAN) PER 5 UNITS: Performed by: INTERNAL MEDICINE

## 2022-08-17 RX ORDER — INSULIN LISPRO 100 [IU]/ML
3 INJECTION, SOLUTION INTRAVENOUS; SUBCUTANEOUS
Status: DISCONTINUED | OUTPATIENT
Start: 2022-08-17 | End: 2022-08-18

## 2022-08-17 RX ORDER — CALCIUM CARBONATE 200(500)MG
2 TABLET,CHEWABLE ORAL 3 TIMES DAILY PRN
Status: DISCONTINUED | OUTPATIENT
Start: 2022-08-17 | End: 2022-08-26 | Stop reason: HOSPADM

## 2022-08-17 RX ADMIN — SENNOSIDES AND DOCUSATE SODIUM 2 TABLET: 50; 8.6 TABLET ORAL at 20:15

## 2022-08-17 RX ADMIN — IPRATROPIUM BROMIDE AND ALBUTEROL SULFATE 3 ML: .5; 3 SOLUTION RESPIRATORY (INHALATION) at 08:31

## 2022-08-17 RX ADMIN — Medication 5000 UNITS: at 08:01

## 2022-08-17 RX ADMIN — IPRATROPIUM BROMIDE AND ALBUTEROL SULFATE 3 ML: .5; 3 SOLUTION RESPIRATORY (INHALATION) at 01:29

## 2022-08-17 RX ADMIN — IPRATROPIUM BROMIDE AND ALBUTEROL SULFATE 3 ML: .5; 3 SOLUTION RESPIRATORY (INHALATION) at 12:53

## 2022-08-17 RX ADMIN — Medication 10 ML: at 04:15

## 2022-08-17 RX ADMIN — INSULIN LISPRO 3 UNITS: 100 INJECTION, SOLUTION INTRAVENOUS; SUBCUTANEOUS at 18:21

## 2022-08-17 RX ADMIN — ACETAMINOPHEN 650 MG: 325 TABLET, FILM COATED ORAL at 02:33

## 2022-08-17 RX ADMIN — AMLODIPINE BESYLATE 10 MG: 10 TABLET ORAL at 08:01

## 2022-08-17 RX ADMIN — METOPROLOL SUCCINATE 50 MG: 50 TABLET, EXTENDED RELEASE ORAL at 08:01

## 2022-08-17 RX ADMIN — SODIUM CHLORIDE 3 G: 900 INJECTION INTRAVENOUS at 04:14

## 2022-08-17 RX ADMIN — ASPIRIN 81 MG CHEWABLE TABLET 81 MG: 81 TABLET CHEWABLE at 08:01

## 2022-08-17 RX ADMIN — Medication 10 ML: at 08:02

## 2022-08-17 RX ADMIN — INSULIN LISPRO 4 UNITS: 100 INJECTION, SOLUTION INTRAVENOUS; SUBCUTANEOUS at 18:21

## 2022-08-17 RX ADMIN — SENNOSIDES AND DOCUSATE SODIUM 2 TABLET: 50; 8.6 TABLET ORAL at 08:00

## 2022-08-17 RX ADMIN — Medication 10 ML: at 20:17

## 2022-08-17 RX ADMIN — Medication 10 ML: at 08:03

## 2022-08-17 RX ADMIN — SODIUM CHLORIDE 3 G: 900 INJECTION INTRAVENOUS at 12:30

## 2022-08-17 RX ADMIN — Medication 10 ML: at 20:16

## 2022-08-17 RX ADMIN — APIXABAN 10 MG: 5 TABLET, FILM COATED ORAL at 20:15

## 2022-08-17 RX ADMIN — INSULIN DETEMIR 26 UNITS: 100 INJECTION, SOLUTION SUBCUTANEOUS at 20:17

## 2022-08-17 RX ADMIN — LEVOTHYROXINE SODIUM 50 MCG: 50 TABLET ORAL at 05:50

## 2022-08-17 RX ADMIN — IPRATROPIUM BROMIDE AND ALBUTEROL SULFATE 3 ML: .5; 3 SOLUTION RESPIRATORY (INHALATION) at 19:11

## 2022-08-17 RX ADMIN — CETIRIZINE HYDROCHLORIDE 5 MG: 10 TABLET, FILM COATED ORAL at 08:01

## 2022-08-17 RX ADMIN — INSULIN LISPRO 4 UNITS: 100 INJECTION, SOLUTION INTRAVENOUS; SUBCUTANEOUS at 08:00

## 2022-08-17 RX ADMIN — APIXABAN 10 MG: 5 TABLET, FILM COATED ORAL at 08:01

## 2022-08-17 RX ADMIN — INSULIN LISPRO 4 UNITS: 100 INJECTION, SOLUTION INTRAVENOUS; SUBCUTANEOUS at 12:30

## 2022-08-17 RX ADMIN — ANTACID TABLETS 2 TABLET: 500 TABLET, CHEWABLE ORAL at 16:42

## 2022-08-17 NOTE — THERAPY TREATMENT NOTE
Patient Name: Jermaine Singh  : 1945    MRN: [id number]                              Today's Date: 2022       Admit Date: 2022    Visit Dx:     ICD-10-CM ICD-9-CM   1. Dislocation of left shoulder joint, initial encounter  S43.005A 831.00   2. Cellulitis of left foot  L03.116 682.7   3. 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  Preserved LV function   • Type 2 diabetes mellitus (HCC)   • Hypertension   • Hyperlipidemia   • Hypothyroidism (acquired)   • Vitamin D deficiency on Rx    • Serum Cr 1.32 on admission.  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 Au   • Dizziness   • Atherosclerosis of native artery of both lower extremities with intermittent claudication (formerly [facility])   • SSS (sick sinus syndrome) (formerly [facility])   • Dislocation of left shoulder joint, initial encounter   • Cellulitis in diabetic foot (formerly [facility])   • Fall   • Sepsis (formerly [facility])   • Lactic acidosis   • Proximal phalanx fracture of the second digit extending into the second metatarsal joint   • Acute renal failure (ARF) (formerly [facility])   • Acute systolic heart failure (formerly [facility])   • DVT, bilateral lower limbs (formerly [facility])   • Acute respiratory failure with hypoxia (formerly [facility])     Past Medical History:   Diagnosis Date   • Asthma    • Coronary artery disease    • Diabetes mellitus (HCC)     started on in[place]lin 2018; started on po meds in ; checking blood sugars daily    • Disease of thyroid gland     po meds daily for hypothyroidism    • History of fracture as a child     rt leg- severe    • Hyperlipidemia    • Hypertension    • Hypothyroidism    • Peripheral neuropathy    • Peripheral vascular disease (HCC)     s/p angiogram -needs stent in left leg    • RBBB    • Right knee pain    • Vitamin D deficiency      Past Surgical History:   Procedure Laterality Date   • APPENDECTOMY     • CARDIAC  CATHETERIZATION N/A 2/14/2019    Procedure: Left Heart Cath;  Surgeon: Cooper Apodaca MD;  Location:  [facility];  Service: Cardiology   • CARDIAC ELECTROPHYSIOLOGY PROCEDURE N/A 5/18/2022    Procedure: DEVICE IMPLANT;  Surgeon: Cooper Apodaca MD;  Location:  [facility];  Service: Cardiology;  Laterality: N/A;   • COLONOSCOPY     • CORONARY ARTERY BYPASS GRAFT N/A 3/22/2019    Procedure: MEDIAN STERNOTOMY, CORONARY ARTERY BYPASS GRAFT X3, UTILIZING THE LEFT INTERNAL MAMMARY ARTERY, EVH AND OPEN HARVEST OF THE RIGHT GREATER SAPHENOUS VEIN, EXPLORATION OF THE LEFT LEG;  Surgeon: Ap Au MD;  Location:  [facility];  Service: Cardiothoracic   • EYE SURGERY Bilateral     cataracts    • INTERVENTIONAL RADIOLOGY PROCEDURE N/A 7/29/2021    Procedure: Abdominal Aortagram with Runoff;  Surgeon: Jermaine Hernandez MD;  Location:  [facility];  Service: Cardiovascular;  Laterality: N/A;   • KNEE ARTHROSCOPY      right x 2, left x 1   • LACERATION REPAIR      right leg   • LEG SURGERY      2 for fracture of rt leg    • TONSILLECTOMY      Adnoidectomy   • TRANS METATARSAL AMPUTATION Left 8/2/2022    Procedure: GREAT TOE AMPUTATION LEFT;  Surgeon: Gopal Márquez MD;  Location:  [facility];  Service: Vascular;  Laterality: Left;      General Information     Row Name 08/17/22 1003          Physical Therapy Time and Intention    Document Type therapy note (daily note)  -HP     Mode of Treatment physical therapy  -     Row Name 08/17/22 1003          General Information    Patient Profile Reviewed yes  -HP     Existing Precautions/Restrictions fall;left;non-weight bearing;other (see comments);oxygen therapy device and L/min  L UE WBAT; NO ER OR ABDuction. NWB L LE.  -HP     Row Name 08/17/22 1003          Cognition    Orientation Status (Cognition) oriented x 4  -HP     Row Name 08/17/22 1003          Safety Issues, Functional Mobility    Impairments  Affecting Function (Mobility) endurance/activity tolerance;pain;range of motion (ROM);strength;shortness of breath  -           User Key  (r) = Recorded By, (t) = Taken By, (c) = Cosigned By    Initials Name Provider Type     Thelma Rich PT Physical Therapist               Mobility     Row Name 08/17/22 1005          Transfers    Comment, (Transfers) Pt attempted to perform STS with Max A x2 and FWW, offload donned. Pt educated on NWB prior to transfer and was unsuccessful at maintaining precautions, limiting mobility. Pt unable to reach full upright position. Pt educated on importance of maintaining NWB for proper healing. Pt verbalized understanding.  -[facility] Name 08/17/22 1005          Bed-Chair Transfer    Bed-Chair [place] (Transfers) dependent (less than 25% patient effort)  -     Assistive Device (Bed-Chair Transfers) lift device  -[facility] Name 08/17/22 1005          Sit-Stand Transfer    Sit-Stand [place] (Transfers) maximum assist (25% patient effort);2 person assist  -     Assistive Device (Sit-Stand Transfers) walker, front-wheeled  -[facility] Name 08/17/22 1005          Mobility    Extremity Weight-bearing Status left upper extremity;left lower extremity  -     Left Upper Extremity (Weight-bearing Status) weight-bearing as tolerated (WBAT)  -     Left Lower Extremity (Weight-bearing Status) non weight-bearing (NWB)  -           User Key  (r) = Recorded By, (t) = Taken By, (c) = Cosigned By    Initials Name Provider Type     Thelma Rich PT Physical Therapist               Obj/Interventions     [facility] Name 08/17/22 1009          Motor Skills    Therapeutic Exercise knee;hip;ankle  -[facility] Name 08/17/22 1009          Hip (Therapeutic Exercise)    Hip (Therapeutic Exercise) strengthening exercise  -     Hip Strengthening (Therapeutic Exercise) bilateral;flexion;extension;aBduction;aDduction;external rotation;internal rotation;heel slides;15 repititions  -      [facility] Name 08/17/22 1009          Knee (Therapeutic Exercise)    Knee (Therapeutic Exercise) strengthening exercise  -     Knee Strengthening (Therapeutic Exercise) bilateral;SLR (straight leg raise);10 repetitions  -[facility] Name 08/17/22 1009          Ankle (Therapeutic Exercise)    Ankle (Therapeutic Exercise) AROM (active range of motion)  -     Ankle AROM (Therapeutic Exercise) bilateral;dorsiflexion;plantarflexion;20 repititions  -[facility] Name 08/17/22 1009          Balance    Balance Assessment sitting static balance;sitting dynamic balance;sit to stand dynamic balance  -     Static Sitting Balance standby assist  -     Dynamic Sitting Balance standby assist  -     Position, Sitting Balance sitting in chair  -     Balance Interventions sitting;standing;sit to stand;occupation based/functional task  -           User Key  (r) = Recorded By, (t) = Taken By, (c) = Cosigned By    Initials Name Provider Type     Thelma Rich PT Physical Therapist               Goals/Plan    No documentation.                Clinical Impression     [facility] Name 08/17/22 1038          Pain    Pretreatment Pain Rating 1/10  -     Posttreatment Pain Rating 1/10  -     Pain Location - Side/Orientation Left  -     Pain Location - ankle  -[facility] Name 08/17/22 1038          Plan of Care Review    Plan of Care Reviewed With patient;spouse  -     Progress no change  -     Outcome Evaluation Pt engaged in therapy this session with good participation. Dep lift transfer to bed. Pt attempted to perform STS but was unable to acheive full upright position with Max A x2 and demonstrated difficulty maintaining NWB on LLE despite education and manual assist. Activity limited by reports of dizziness (/77) and safety concern. Pt tolerated ther-ex and fatigued quickly. Continue to recommend d/c to IRF when medically appropriate.  -[facility] Name 08/17/22 1038          Vital Signs    Pre Systolic BP Rehab --  VSS   -HP     Intra Systolic BP Rehab 140  -HP     Intra Treatment Diastolic BP 77  -HP     Pre Patient Position Supine  -HP     Intra Patient Position Sitting  -HP     Post Patient Position Sitting  -HP     Row Name 08/17/22 1038          Positioning and Restraints    Pre-Treatment Position in bed  -HP     Post Treatment Position chair  -HP     In Chair notified nsg;reclined;sitting;call light within reach;encouraged to call for assist;exit alarm on;with family/caregiver;legs elevated  -HP           User Key  (r) = Recorded By, (t) = Taken By, (c) = Cosigned By    Initials Name Provider Type     Thelma Rich, SETH Physical Therapist               Outcome Measures     Row Name 08/17/22 1042          How much help from another person do you currently need...    Turning from your back to your side while in flat bed without using bedrails? 3  -HP     Moving from lying on back to sitting on the side of a flat bed without bedrails? 2  -HP     Moving to and from a bed to a chair (including a wheelchair)? 1  -HP     Standing up from a chair using your arms (e.g., wheelchair, bedside chair)? 1  -HP     Climbing 3-5 steps with a railing? 1  -HP     To walk in hospital room? 1  -HP     AM-PAC 6 Clicks Score (PT) 9  -HP     Highest level of mobility 3 --> Sat at edge of bed  -HP     Row Name 08/17/22 1042          Functional Assessment    Outcome Measure Options AM-PAC 6 Clicks Basic Mobility (PT)  -HP           User Key  (r) = Recorded By, (t) = Taken By, (c) = Cosigned By    Initials Name Provider Type     Thelma Rich PT Physical Therapist                             Physical Therapy Education                 Title: PT OT SLP Therapies (Done)     Topic: Physical Therapy (Done)     Point: Mobility training (Done)     Learning Progress Summary           Patient Acceptance, E,D, VU,NR by  at 8/17/2022 1042   Family Acceptance, E,D, VU,NR by  at 8/17/2022 1042      Show all documentation for this point (10)                  Point: Home exercise program (Done)     Learning Progress Summary           Patient Acceptance, E,D, VU,NR by  at 8/17/2022 1042   Family Acceptance, E,D, VU,NR by  at 8/17/2022 1042      Show all documentation for this point (10)                 Point: Body mechanics (Done)     Learning Progress Summary           Patient Acceptance, E,D, VU,NR by  at 8/17/2022 1042   Family Acceptance, E,D, VU,NR by  at 8/17/2022 1042      Show all documentation for this point (10)                 Point: Precautions (Done)     Learning Progress Summary           Patient Acceptance, E,D, VU,NR by  at 8/17/2022 1042   Family Acceptance, E,D, VU,NR by  at 8/17/2022 1042      Show all documentation for this point (10)                             User Key     Initials Effective Dates Name Provider Type Discipline     06/01/21 -  Thelma Rich, PT Physical Therapist PT              PT Recommendation and Plan  Planned Therapy Interventions (PT): balance training, bed mobility training, gait training, home exercise program, patient/family education, stretching, strengthening, stair training, transfer training, wheelchair management/propulsion training  Plan of Care Reviewed With: patient, spouse  Progress: no change  Outcome Evaluation: Pt engaged in therapy this session with good participation. Dep lift transfer to bed. Pt attempted to perform STS but was unable to acheive full upright position with Max A x2 and demonstrated difficulty maintaining NWB on LLE despite education and manual assist. Activity limited by reports of dizziness (/77) and safety concern. Pt tolerated ther-ex and fatigued quickly. Continue to recommend d/c to IRF when medically appropriate.     Time Calculation:    PT Charges     Row Name 08/17/22 1003             Time Calculation    Start Time 1003  -      PT Received On 08/17/22  -              Time Calculation- PT    Total Timed Code Minutes- PT 25 minute(s)  -              Timed Charges     61254 - PT Therapeutic Exercise Minutes 15  -HP      93260 - PT Therapeutic Activity Minutes 10  -HP              Total Minutes    Timed Charges Total Minutes 25  -HP       Total Minutes 25  -HP            User Key  (r) = Recorded By, (t) = Taken By, (c) = Cosigned By    Initials Name Provider Type    HP Thelma Rich, PT Physical Therapist              Therapy Charges for Today     Code Description Service Date Service Provider Modifiers Qty    72651520907  PT THER PROC EA 15 MIN 8/17/2022 Thelma Rich, PT GP 1    [id number] HC PT THERAPEUTIC ACT EA 15 MIN 8/17/2022 Thelma Rich, PT GP 1          PT G-Codes  Outcome Measure Options: AM-PAC 6 Clicks Basic Mobility (PT)  AM-PAC 6 Clicks Score (PT): 9  AM-PAC 6 Clicks Score (OT): 10    Thelma Rich PT  8/17/2022

## 2022-08-17 NOTE — PROGRESS NOTES
Jermaine JIMENES Francisco  1945  [id number]    Date of Consult: 7/28/22    Admission Date: 7/27/2022      Requesting Provider: No ref. provider found  Evaluating Physician: Sage Braga MD    Reason for Consultation: Evaluation of toe wound    History of present illness:    Patient is a 77 y.o. male with coronary disease history of CABG diabetes mellitus type 2 hypothyroidism hypertension hyperlipidemia prevascular disease presents the emergency room after having a fall and injuring left shoulder patient tripped while walking his dog.  Coincidentally patient wears an orthotic shoe as recommended by a podiatrist has been on oral doxycycline for a left toe wound we are asked to evaluate this patient to start on broad-spectrum antibiotics occluding vancomycin and Zosyn.  X-ray of left foot showed soft tissue swelling in the forefoot without obvious fracture or osteomyelitis.    Has history of pacemaker    History of vascular stent placed in right leg.    7/29/2022; is having bone scan today denies fevers rash sore throat or diarrhea    7/30/22:  Bone scan compatible with osteomyelitis distal phalanx of first digit, acute fracture 2nd digit.  Afebrile. Blood cultures no growth to date.     7/31/22:  Afebrile.   Dr. Márquez consulting Dr. Apodaca for vascular studies left lower extremity.   No n/v/d.  Complains of shoulder pain.     8/2/22; doing well; had surgery today; toe amputation, no fever, rash, sore throat    8/3/22; no events overnight; resting quietly no events overnight    8/4/22; doing well; no events overnight; no fever, rash, sore throat;   8/5/22; no events overnight; no fever, rash, sore throat, no diarrhea    8/8/22; no events overnight; no fever, rash, sore throat    8/9/22; no events overnight; no fever, rash, sore throat no diarrhea  8/11/22; patient on hfnc, followed by cardiology being worked up for elevated troponin, pobnp.  Resting quietly    8/12/22; on hfnc, getting diuresed, no fever, rahs, sore  throat has nausea, reports chest pain    8/14/22: on 4L O2.  Denies f/c, sore throat, n/v/d, rashes.   8/15/22: on 4.5 L O2.  Still with right upper chest wall pain.  Denies f/c, sob, n/v/d, rashes. Just back from [facility].      8/17/2022 patient's is on 2.5 L of oxygen has no complaints denies fevers rash sore throat family is by bedside    Past Medical History:   Diagnosis Date   • Asthma    • Coronary artery disease    • Diabetes mellitus (HCC) 2000    started on inuslin 12/2018; started on po meds in 2000; checking blood sugars daily    • Disease of thyroid gland     po meds daily for hypothyroidism    • History of fracture as a child     rt leg- severe    • Hyperlipidemia    • Hypertension    • Hypothyroidism    • Peripheral neuropathy    • Peripheral vascular disease (HCC)     s/p angiogram 2/19-needs stent in left leg    • RBBB    • Right knee pain    • Vitamin D deficiency        Past Surgical History:   Procedure Laterality Date   • APPENDECTOMY     • CARDIAC CATHETERIZATION N/A 2/14/2019    Procedure: Left Heart Cath;  Surgeon: Cooper Apodaca MD;  Location:  [facility] CATH INVASIVE LOCATION;  Service: Cardiology   • CARDIAC ELECTROPHYSIOLOGY PROCEDURE N/A 5/18/2022    Procedure: DEVICE IMPLANT;  Surgeon: Cooper Apodaca MD;  Location:  [facility] CATH INVASIVE LOCATION;  Service: Cardiology;  Laterality: N/A;   • COLONOSCOPY     • CORONARY ARTERY BYPASS GRAFT N/A 3/22/2019    Procedure: MEDIAN STERNOTOMY, CORONARY ARTERY BYPASS GRAFT X3, UTILIZING THE LEFT INTERNAL MAMMARY ARTERY, EVH AND OPEN HARVEST OF THE RIGHT GREATER SAPHENOUS VEIN, EXPLORATION OF THE LEFT LEG;  Surgeon: Ap Au MD;  Location:  [facility];  Service: Cardiothoracic   • EYE SURGERY Bilateral     cataracts    • INTERVENTIONAL RADIOLOGY PROCEDURE N/A 7/29/2021    Procedure: Abdominal Aortagram with Runoff;  Surgeon: Jermaine Hernandez MD;  Location:  [facility] CATH INVASIVE LOCATION;  Service: Cardiovascular;  Laterality:  N/A;   • KNEE ARTHROSCOPY      right x 2, left x 1   • LACERATION REPAIR      right leg   • LEG SURGERY      2 for fracture of rt leg    • TONSILLECTOMY      Adnoidectomy   • TRANS METATARSAL AMPUTATION Left 8/2/2022    Procedure: GREAT TOE AMPUTATION LEFT;  Surgeon: Gopal Márquez MD;  Location: [facility];  Service: Vascular;  Laterality: Left;       Family History   Problem Relation Age of Onset   • Coronary artery disease Mother    • Diabetes Mother    • Cancer Father        Social History     Socioeconomic History   • Marital status:    • Number of children: 2   Tobacco Use   • Smoking status: Never Smoker   • Smokeless tobacco: Never Used   Substance and Sexual Activity   • Alcohol use: No   • Drug use: No   • Sexual activity: Defer       No Known Allergies      Medication:    Current Facility-Administered Medications:   •  acetaminophen (TYLENOL) tablet 650 mg, 650 mg, Oral, Q4H PRN, 650 mg at 08/17/22 0233 **OR** acetaminophen (TYLENOL) 160 MG/5ML solution 650 mg, 650 mg, Oral, Q4H PRN **OR** acetaminophen (TYLENOL) suppository 650 mg, 650 mg, Rectal, Q4H PRN, Nishant Allan PA  •  amLODIPine (NORVASC) tablet 10 mg, 10 mg, Oral, Daily, Nishant Allan PA, 10 mg at 08/17/22 0801  •  [START ON 8/18/2022] ampicillin-sulbactam 3 g/100 mL 0.9% NS IVPB, 3 g, Intravenous, Q12H, Clifford Tavares [facility]  •  apixaban (ELIQUIS) tablet 10 mg, 10 mg, Oral, Q12H, 10 mg at 08/17/22 0801 **FOLLOWED BY** [START ON 8/21/2022] apixaban (ELIQUIS) tablet 5 mg, 5 mg, Oral, Q12H, Reilly Thomas MD  •  aspirin chewable tablet 81 mg, 81 mg, Oral, Daily, Nishant Allan PA, 81 mg at 08/17/22 0801  •  bisacodyl (DULCOLAX) suppository 10 mg, 10 mg, Rectal, Daily PRN, Nishant Allan PA, 10 mg at 08/12/22 2041  •  calcium carbonate (TUMS) chewable tablet 500 mg (200 mg elemental), 2 tablet, Oral, TID PRN, Hayder Vogel MD, 2 tablet at 08/17/22 0352  •  cetirizine (zyrTEC) tablet 5 mg, 5 mg, Oral, Daily, Nishant Allan, PA, 5  mg at 08/17/22 0801  •  dextrose (D50W) (25 g/50 mL) IV injection 25 g, 25 g, Intravenous, Q15 Min PRN, Nishant Allan PA  •  dextrose (GLUTOSE) oral gel 15 g, 15 g, Oral, Q15 Min PRN, Nishant Allan PA  •  glucagon (human recombinant) (GLUCAGEN DIAGNOSTIC) injection 1 mg, 1 mg, Intramuscular, Q15 Min PRN, Nishant Allan PA  •  HYDROcodone-acetaminophen (NORCO) 5-325 MG per tablet 1 tablet, 1 tablet, Oral, Q4H PRN, Peter Braga DO, 1 tablet at 08/15/22 0733  •  insulin detemir (LEVEMIR) injection 26 Units, 26 Units, Subcutaneous, Nightly, Hayder Vogel MD  •  Insulin Lispro (humaLOG) injection 0-9 Units, 0-9 Units, Subcutaneous, TID AC, Hayder Vogel MD, 4 Units at 08/17/22 1230  •  Insulin Lispro (humaLOG) injection 3 Units, 3 Units, Subcutaneous, TID With Meals, Hayder Vogel MD  •  ipratropium-albuterol (DUO-NEB) nebulizer solution 3 mL, 3 mL, Nebulization, Q4H PRN, Eliana Hightower, APRN, 3 mL at 08/17/22 1253  •  levothyroxine (SYNTHROID, LEVOTHROID) tablet 50 mcg, 50 mcg, Oral, Q AM, Nishant Allan PA, 50 mcg at 08/17/22 0550  •  metoprolol succinate XL (TOPROL-XL) 24 hr tablet 50 mg, 50 mg, Oral, Q24H, Nishant Allan PA, 50 mg at 08/17/22 0801  •  nitroglycerin (NITROSTAT) SL tablet 0.4 mg, 0.4 mg, Sublingual, Q5 Min PRN, Reilly Thomas MD, 0.4 mg at 08/13/22 2206  •  ondansetron (ZOFRAN) tablet 4 mg, 4 mg, Oral, Q6H PRN **OR** ondansetron (ZOFRAN) injection 4 mg, 4 mg, Intravenous, Q6H PRN, Locklar, Nishant C, PA, 4 mg at 08/10/22 1607  •  phenol (CHLORASEPTIC) 1.4 % liquid 1 spray, 1 spray, Mouth/Throat, Q2H PRN, Megan Shukla PA, 1 spray at 08/04/22 1610  •  polyethylene glycol (MIRALAX) packet 17 g, 17 g, Oral, Daily, Rajani Farr DO, 17 g at 08/16/22 1059  •  prochlorperazine (COMPAZINE) injection 5 mg, 5 mg, Intravenous, Q6H PRN, Rajani Farr DO, 5 mg at 08/13/22 1955  •  sennosides-docusate (PERICOLACE) 8.6-50 MG per tablet 2 tablet, 2 tablet, Oral, BID PRN,  Nishant Allan PA, 2 tablet at 08/09/22 1402  •  sennosides-docusate (PERICOLACE) 8.6-50 MG per tablet 2 tablet, 2 tablet, Oral, BID, Reilly Thomas MD, 2 tablet at 08/17/22 0800  •  simethicone (MYLICON) chewable tablet 80 mg, 80 mg, Oral, 4x Daily PRN, Daphne Lal MD  •  [COMPLETED] Insert peripheral IV, , , Once **AND** sodium chloride 0.9 % flush 10 mL, 10 mL, Intravenous, PRN, LockNishant boyd, PA, 10 mL at 08/17/22 0415  •  sodium chloride 0.9 % flush 10 mL, 10 mL, Intravenous, Q12H, Nishant Allan, PA, 10 mL at 08/17/22 0802  •  sodium chloride 0.9 % flush 10 mL, 10 mL, Intravenous, PRN, Nishant Allan, PA  •  sodium chloride 0.9 % flush 10 mL, 10 mL, Intravenous, Q12H, YordanRajani garcia R, DO, 10 mL at 08/17/22 0803  •  sodium chloride 0.9 % flush 10 mL, 10 mL, Intravenous, PRN, Rajani Farr, DO, 10 mL at 08/16/22 1121  •  vitamin D3 capsule 5,000 Units, 5,000 Units, Oral, Daily, Nishant Allan, PA, 5,000 Units at 08/17/22 0801    Facility-Administered Medications Ordered in Other Encounters:   •  Chlorhexidine Gluconate Cloth 2 % pads 1 application, 1 application, Topical, Q12H PRN, Mohan Arauz PA    Antibiotics:  Anti-Infectives (From admission, onward)    Ordered     Dose/Rate Route Frequency Start Stop    08/17/22 1715  ampicillin-sulbactam 3 g/100 mL 0.9% NS IVPB        Ordering Provider: Clifford Tavares RPH    3 g Intravenous Every 12 Hours 08/18/22 0030 08/22/22 0029    08/04/22 1550  cefepime (MAXIPIME) 2 g/100 mL 0.9% NS (mbp)        Ordering Provider: Sage Braga MD    2 g  200 mL/hr over 30 Minutes Intravenous Once 08/04/22 1645 08/04/22 1640    08/02/22 1111  vancomycin 1500 mg/500 mL 0.9% NS IVPB ([facility])        Ordering Provider: Nishant Allan PA    15 mg/kg × 98 kg Intravenous Once 08/02/22 2200 08/02/22 2130    08/01/22 1209  vancomycin in dextrose 5% 150 mL (VANCOCIN) IVPB 750 mg        Ordering Provider: Miguel Bray [facility]    750 mg Intravenous Every 12  Hours 22 2200 22 0934    22 1209  vancomycin in dextrose 5% 150 mL (VANCOCIN) IVPB 750 mg        Ordering Provider: Miguel Bray RPH    750 mg  over 60 Minutes Intravenous Every 12 Hours 22 1300 22 1422    22 0014  piperacillin-tazobactam (ZOSYN) 3.375 g in iso-osmotic dextrose 50 ml (premix)        Ordering Provider: Eliana Hightower APRN    3.375 g  over 4 Hours Intravenous Every 8 Hours 22 0800 22 0303    22 2208  piperacillin-tazobactam (ZOSYN) 3.375 g in iso-osmotic dextrose 50 ml (premix)        Ordering Provider: Donny Hightower APRN    3.375 g  over 30 Minutes Intravenous Once 22 2210 22 0320    22 2208  vancomycin 2000 mg/500 mL 0.9% NS IVPB ([facility])        Ordering Provider: Donny Hightower APRN    20 mg/kg × 98 kg Intravenous Once 22 2210 22 0040            Review of Systems:  See HPI      Physical Exam:   Vital Signs  Temp (24hrs), Av.6 °F (36.4 °C), Min:96.7 °F (35.9 °C), Max:98.4 °F (36.9 °C)    Temp  Min: 96.7 °F (35.9 °C)  Max: 98.4 °F (36.9 °C)  BP  Min: 122/44  Max: 157/84  Pulse  Min: 62  Max: 69  Resp  Min: 17  Max: 20  SpO2  Min: 93 %  Max: 96 %    GENERAL: resting quietly on 2.5 L per nasal cannula  HEENT: Normocephalic, atraumatic.  PERRL. EOMI. No conjunctival injection. No icterus.   HEART: RRR; No murmur,  LUNGS: Clear to auscultation bilaterally   ABDOMEN: Soft, nontender,   :  Without Fonseca catheter.  MSK: Left foot wrapped  SKIN: Warm and dry without cutaneous eruptions on Inspection/palpation.        Laboratory Data    Results from last 7 days   Lab Units 08/15/22  0615 22  2310 22  1224   WBC 10*3/mm3 9.63 14.07* 14.42*   HEMOGLOBIN g/dL 10.6* 10.2* 10.5*   HEMATOCRIT % 32.6* 31.2* 31.4*   PLATELETS 10*3/mm3 199 226 209     Results from last 7 days   Lab Units 22  0507   SODIUM mmol/L 137   POTASSIUM mmol/L 4.6   CHLORIDE mmol/L 96*   CO2 mmol/L 28.0   BUN mg/dL 78*   CREATININE  mg/dL 2.66*   GLUCOSE mg/dL 232*   CALCIUM mg/dL 8.2*     Results from last 7 days   Lab Units 08/13/22  2310   ALK PHOS U/L 60   BILIRUBIN mg/dL 0.3   ALT (SGPT) U/L 13   AST (SGOT) U/L 12             Results from last 7 days   Lab Units 08/11/22  0636   LACTATE mmol/L 1.2     Results from last 7 days   Lab Units 08/15/22  0615   CK TOTAL U/L 171         Estimated Creatinine Clearance: 33.2 mL/min (A) (by C-G formula based on SCr of 2.66 mg/dL (H)).      Microbiology:  Microbiology Results (last 10 days)     Procedure Component Value - Date/Time    Eosinophil Smear - Urine, Urine, Clean Catch [[id number]]  (Normal) Collected: 08/14/22 1818    Lab Status: Final result Specimen: Urine, Clean Catch Updated: 08/14/22 2101     Eosinophil Smear 0 % EOS/100 Cells     Narrative:      No eosinophil seen                Radiology:  Imaging Results (Last 72 Hours)     Procedure Component Value Units Date/Time    XR Chest 1 View [[id number]] Collected: 08/16/22 0602     Updated: 08/16/22 0607    Narrative:      FRONTAL VIEW OF THE CHEST    CLINICAL INDICATION: Dyspnea    COMPARISON: August 14, 2022    FINDINGS:     Lines: Left chest wall pacemaker. Intracardiac leads appear intact. Right upper extremity PICC with the tip near the superior cavoatrial junction.    Interval increase in patchy airspace opacities, right greater than left. Left basilar opacity silhouetting left hemidiaphragm with possible left effusion. Heart size is stable. Osseous structures are unremarkable.        Impression:      Increasing airspace opacities and left basilar opacity with likely left effusion. Findings may be related to worsening pneumonia or edema.     Support devices as above.    Electronically signed by:  Shanel Molina    8/16/2022 4:06 AM Mountain Time    US Renal Bilateral [[id number]] Collected: 08/15/22 1621     Updated: 08/15/22 1624    Narrative:      DATE OF EXAM: 8/15/2022 12:11 PM     PROCEDURE: US RENAL BILATERAL-     INDICATIONS: Acute  kidney injury; S43.005A-Unspecified dislocation of  left shoulder joint, initial encounter; L03.116-Cellulitis of left lower  limb; Z78.9-Other specified health status     COMPARISON: No comparisons available.     TECHNIQUE: Grayscale and color Doppler ultrasound evaluation of the  kidneys and urinary bladder was performed.     FINDINGS:  Sonographic grayscale and color Doppler evaluation of the kidneys  demonstrates      Right kidney measures 11.6 cm in length, without apparent mass or  hydronephrosis. Normal color Doppler flow.      Left kidney measures 11.1 cm in length without apparent mass or  hydronephrosis. Normal color Doppler flow.      Unremarkable urinary bladder        Impression:      Normal sonographic appearance of the kidneys, without evidence of  hydronephrosis.     This report was finalized on 8/15/2022 4:21 PM by Jermaine Wang.           Estimated Creatinine Clearance: 33.2 mL/min (A) (by C-G formula based on SCr of 2.66 mg/dL (H)).      Impression:   Leukocytosis with neutrophilia, improved.   Lactic acidosis  Left great toe wound- osteomyelitis by bone scan- amputation recommended by Dr. Márquez   Status post fall  Diabetes mellitus type 2  Left shoulder dislocation  Probable peripheral vascular disease  Acute renal failure, ongoing.   Elevated proBNP/difficulty diuresis given renal failure.  Right upper chest wall pain, ongoing.     PLAN/RECOMMENDATIONS:     cont unasyn okay to reduce dose to every 12 hours given elevation of creatinine and creatinine clearance of 33  Defer CHF management to cardiology     Cont iv abx x 6 weeks form 8/2/22 (9/13/22)  Patient has improved    picc placed 8/10  Anticipate placement at [facility]  Case discussed with Dr. Vogel    D/w family    I have seen and examined patient and agree with above  Sage Braga MD  8/17/2022  18:13 EDT

## 2022-08-17 NOTE — PLAN OF CARE
Goal Outcome Evaluation:  Plan of Care Reviewed With: patient, spouse        Progress: no change  Outcome Evaluation: Pt engaged in therapy this session with good participation. Dep lift transfer to bed. Pt attempted to perform STS but was unable to acheive full upright position with Max A x2 and demonstrated difficulty maintaining NWB on LLE despite education and manual assist. Activity limited by reports of dizziness (/77) and safety concern. Pt tolerated ther-ex and fatigued quickly. Continue to recommend d/c to IRF when medically appropriate.

## 2022-08-17 NOTE — PLAN OF CARE
Goal Outcome Evaluation:   VSS. 2.5L nc humidified currently sats remains normal. No complaints of pain. Recurrent sob- respiratory notified, dr rousseau. Will continue to monitor.

## 2022-08-17 NOTE — PLAN OF CARE
Problem: Adult Inpatient Plan of Care  Goal: Plan of Care Review  Recent Flowsheet Documentation  Taken 8/17/2022 1051 by Guerline Lazcano, ANG  Progress: no change  Plan of Care Reviewed With:   patient   spouse  Outcome Evaluation: Pt demonstrated improved participation and motivation in OT this date. Req'd gross min A for rolling L< > R in bed for toileting A, linen mgmt and sling placement in preparation for OOB t/f. Pt able to use RUE to grasp bed railing and pull self to L side while maintaining precautions with L shoulder movement, req'd increased A to maintain sidelying for extended toileting hygiene A. Grossly dependent for toileting hygiene and related cloth mgmt and linen change. Reviewed with pt and spouse optimal donna tech, position, sequencing for UBD requiring min A, pt deferred L axilla care and req'd dep care d/t dizziness experienced in sitting upright. RN aware. Pt deferred sling for comfort as pain managed. Dep for d/d offloading shoe to LLE prior to anticipated t/f OOB. Pt tolerated AAROM at L shoulder for FE limited to ~ 90 degrees before increased pain, further tolerated AROM at elbow, wrist, hand. Will continue to progress pt as tolerated during hospitalization, continue to recommend further rehab at d/c.

## 2022-08-17 NOTE — THERAPY TREATMENT NOTE
Patient Name: Jermaine Singh  : 1945    MRN: [id number]                              Today's Date: 2022       Admit Date: 2022    Visit Dx:     ICD-10-CM ICD-9-CM   1. Dislocation of left shoulder joint, initial encounter  S43.005A 831.00   2. Cellulitis of left foot  L03.116 682.7   3. 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  Preserved LV function   • Type 2 diabetes mellitus (HCC)   • Hypertension   • Hyperlipidemia   • Hypothyroidism (acquired)   • Vitamin D deficiency on Rx    • Serum Cr 1.32 on admission.  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 Au   • Dizziness   • Atherosclerosis of native artery of both lower extremities with intermittent claudication ([facility])   • SSS (sick sinus syndrome) ([facility])   • Dislocation of left shoulder joint, initial encounter   • Cellulitis in diabetic foot ([facility])   • Fall   • Sepsis ([facility])   • Lactic acidosis   • Proximal phalanx fracture of the second digit extending into the second metatarsal joint   • Acute renal failure (ARF) ([facility])   • Acute systolic heart failure ([facility])   • DVT, bilateral lower limbs ([facility])   • Acute respiratory failure with hypoxia ([facility])     Past Medical History:   Diagnosis Date   • Asthma    • Coronary artery disease    • Diabetes mellitus (HCC)     started on in[place] 2018; started on po meds in ; checking blood sugars daily    • Disease of thyroid gland     po meds daily for hypothyroidism    • History of fracture as a child     rt leg- severe    • Hyperlipidemia    • Hypertension    • Hypothyroidism    • Peripheral neuropathy    • Peripheral vascular disease (HCC)     s/p angiogram -needs stent in left leg    • RBBB    • Right knee pain    • Vitamin D deficiency      Past Surgical History:   Procedure Laterality Date   • APPENDECTOMY     • CARDIAC  CATHETERIZATION N/A 2/14/2019    Procedure: Left Heart Cath;  Surgeon: Cooper Apodaca MD;  Location:  [facility];  Service: Cardiology   • CARDIAC ELECTROPHYSIOLOGY PROCEDURE N/A 5/18/2022    Procedure: DEVICE IMPLANT;  Surgeon: Cooper Apodaca MD;  Location:  [facility];  Service: Cardiology;  Laterality: N/A;   • COLONOSCOPY     • CORONARY ARTERY BYPASS GRAFT N/A 3/22/2019    Procedure: MEDIAN STERNOTOMY, CORONARY ARTERY BYPASS GRAFT X3, UTILIZING THE LEFT INTERNAL MAMMARY ARTERY, EVH AND OPEN HARVEST OF THE RIGHT GREATER SAPHENOUS VEIN, EXPLORATION OF THE LEFT LEG;  Surgeon: Ap Au MD;  Location:  [facility];  Service: Cardiothoracic   • EYE SURGERY Bilateral     cataracts    • INTERVENTIONAL RADIOLOGY PROCEDURE N/A 7/29/2021    Procedure: Abdominal Aortagram with Runoff;  Surgeon: Jermaine Hernandez MD;  Location:  [facility];  Service: Cardiovascular;  Laterality: N/A;   • KNEE ARTHROSCOPY      right x 2, left x 1   • LACERATION REPAIR      right leg   • LEG SURGERY      2 for fracture of rt leg    • TONSILLECTOMY      Adnoidectomy   • TRANS METATARSAL AMPUTATION Left 8/2/2022    Procedure: GREAT TOE AMPUTATION LEFT;  Surgeon: Gopal Márquez MD;  Location:  [facility];  Service: Vascular;  Laterality: Left;      General Information     Row Name 08/17/22 1025          OT Time and Intention    Document Type therapy note (daily note)  -JY     Mode of Treatment occupational therapy;individual therapy  -JY     Row Name 08/17/22 1025          General Information    Patient Profile Reviewed yes  -JY     Existing Precautions/Restrictions fall;left;non-weight bearing;other (see comments);oxygen therapy device and L/min  LUE WBAT, no ABDuction or ER at LUE, sling for comfort LUE, NWB LLE w/ offloading shoe  -JY     Barriers to Rehab previous functional deficit;medically complex  -JY     Row Name 08/17/22 1026          Cognition    Orientation  Status (Cognition) oriented x 4  -JY     Row Name 08/17/22 1025          Safety Issues, Functional Mobility    Safety Issues Affecting Function (Mobility) safety precaution awareness;safety precautions follow-through/compliance;insight into deficits/self-awareness;awareness of need for assistance;judgment;sequencing abilities  -JY     Impairments Affecting Function (Mobility) endurance/activity tolerance;pain;range of motion (ROM);strength;shortness of breath;cognition  -JY     Cognitive Impairments, Mobility Safety/Performance awareness, need for assistance;insight into deficits/self-awareness;problem-solving/reasoning;safety precaution awareness;safety precaution follow-through;sequencing abilities  -JY     Comment, Safety Issues/Impairments (Mobility) pt alert and able to follow commands, increased motivation to move OOB and participate in bed mobility for toileting A, linen/sling mgmt prior to t/f to recliner  -DAKOTA           User Key  (r) = Recorded By, (t) = Taken By, (c) = Cosigned By    Initials Name Provider Type    Guerline Corey OT Occupational Therapist                 Mobility/ADL's     Row Name 08/17/22 1034          Bed Mobility    Bed Mobility rolling left;rolling right  -JY     Rolling Left Turner (Bed Mobility) minimum assist (75% patient effort);verbal cues  -JODELL     Rolling Right Turner (Bed Mobility) minimum assist (75% patient effort);verbal cues  -JY     Bed Mobility, Safety Issues decreased use of arms for pushing/pulling;decreased use of legs for bridging/pushing  -JY     Assistive Device (Bed Mobility) head of bed elevated;bed rails;draw sheet  -DAKOTA     Comment, (Bed Mobility) bed mobility facilitated for sling placement and linen exchange associated with toileting A; close monitoring of LUE position to adhere to precautions with no ABduction or ER, gross min A to initiate rolling at UB with bed rail use and HOB maintained slightly elevated for respiratory support and f/u with  LE placement to sidelying, increased A to maintain sidelying for midline toileting A  -DAKOTA     [facility] Name 08/17/22 1034          Transfers    Transfers bed-chair transfer  -J     Bed-Chair Beckham (Transfers) dependent (less than 25% patient effort);2 person assist  -J     Assistive Device (Bed-Chair Transfers) lift device  -DAKOTA     Row Name 08/17/22 1034          Bed-Chair Transfer    Comment, (Bed-Chair Transfer) dependent in t/f between bed and recliner with lift device to safely maintain NWB at LLE; defer to PT for further t/f specifics  -     Row Name 08/17/22 1034          Functional Mobility    Functional Mobility- Comment defer to PT for specifics  -[facility] Name 08/17/22 1034          Activities of Daily Living    BADL Assessment/Intervention upper body dressing;lower body dressing;toileting;bathing  -ODELL     [facility] Name 08/17/22 1034          Mobility    Extremity Weight-bearing Status left upper extremity;left lower extremity  -J     Left Upper Extremity (Weight-bearing Status) weight-bearing as tolerated (WBAT)  -J     Left Lower Extremity (Weight-bearing Status) non weight-bearing (NWB)   -     Row Name 08/17/22 1034          Upper Body Dressing Assessment/Training    Beckham Level (Upper Body Dressing) doff;don;pajama/robe;minimum assist (75% patient effort);verbal cues  -J     Assistive Devices (Upper Body Dressing) other (see comments)  donna tech  -JY     Position (Upper Body Dressing) supported sitting  -JY     Comment, (Upper Body Dressing) education provided including review, reinforcement of donna tech for dressing focused on optimal sequencing for threading and unthreading with impacted LUE, sling for comfort, pt deferred as pain managed at UE; increased A provided at E mgmt d/t IV plmt  -     Row Name 08/17/22 1034          Lower Body Dressing Assessment/Training    Beckham Level (Lower Body Dressing) doff;don;shoes/slippers;dependent (less than 25% patient effort)  -JY      Position (Lower Body Dressing) supine  -JY     Comment, (Lower Body Dressing) offloading shoe donned with dependent care in prep for OOB activity  -JY     Row Name 08/17/22 1034          Toileting Assessment/Training    Wheeler Level (Toileting) change pad/brief;perform perineal hygiene;dependent (less than 25% patient effort);verbal cues  -JY     Assistive Devices (Toileting) other (see comments)  bedside care  -JY     Position (Toileting) supine;other (see comments)  sidelying  -JY     Comment, (Toileting) pt grossly dependent for BM care at bedside, assisted in rolling L <> R for improved access  -JY     Row Name 08/17/22 1034          Bathing Assessment/Intervention    Wheeler Level (Bathing) upper body;other (see comments);dependent (less than 25% patient effort)  L axilla care  -JY     Assistive Devices (Bathing) other (see comments)  donna tech  -JY     Position (Bathing) supported sitting  -JY     Comment, (Bathing) OT reviewed/educated pt and spouse on optimal donna tech, position to adhere to no ABDuction, ER at L shoulder; pt deferred completion this date d/t dizziness experienced in sitting up thus dependent care provided for safety  -JY           User Key  (r) = Recorded By, (t) = Taken By, (c) = Cosigned By    Initials Name Provider Type    Guerline Corey OT Occupational Therapist               Obj/Interventions     Row Name 08/17/22 1047          Shoulder (Therapeutic Exercise)    Shoulder (Therapeutic Exercise) AAROM (active assistive range of motion)  -JODELL     Shoulder AAROM (Therapeutic Exercise) left;flexion;extension;sitting;10 repetitions  -JY     Row Name 08/17/22 1047          Elbow/Forearm (Therapeutic Exercise)    Elbow/Forearm AROM (Therapeutic Exercise) left;flexion;extension;supination;pronation;sitting;10 repetitions  -JY     Row Name 08/17/22 1047          Wrist (Therapeutic Exercise)    Wrist (Therapeutic Exercise) AROM (active range of motion)  -JY     Wrist AROM  (Therapeutic Exercise) left;flexion;extension;10 repetitions  -JY     Row Name 08/17/22 1047          Hand (Therapeutic Exercise)    Hand (Therapeutic Exercise) AROM (active range of motion)  -JY     Hand AROM/AAROM (Therapeutic Exercise) left;AROM (active range of motion);finger flexion;finger extension;10 repetitions  -JY     Row Name 08/17/22 1047          Motor Skills    Therapeutic Exercise elbow/forearm;shoulder;wrist;hand;other (see comments)  facilitated LUE TE as indicated by MD with no ABDuction or ER yet AAROM shoulder flex/ext, AROM elbow, wrist, hand facilitated; increase in pain at ~ 90 degrees sh flexion  -JY     Row Name 08/17/22 1047          Balance    Balance Assessment sitting static balance;sitting dynamic balance  -JY     Static Sitting Balance standby assist  -JY     Dynamic Sitting Balance standby assist;other (see comments)  forward sitting for clothing mgmt  -JY     Position, Sitting Balance supported;sitting in chair  -JY     Balance Interventions sitting;supported;static;dynamic;occupation based/functional task  -JY     Comment, Balance no LOB during seated tasks  -JY           User Key  (r) = Recorded By, (t) = Taken By, (c) = Cosigned By    Initials Name Provider Type    Guerline Corey OT Occupational Therapist               Goals/Plan    No documentation.                Clinical Impression     Row Name 08/17/22 1051          Pain Assessment    Pretreatment Pain Rating 1/10  -JY     Posttreatment Pain Rating 1/10  -JY     Pain Location - Side/Orientation Left  -JY     Pain Location - ankle  -JY     Pre/Posttreatment Pain Comment persistent report of L foot/ankle pain, brief increase in L shoulder pain with forward elevation TE, resolved with completion of TE  -JY     Pain Intervention(s) Repositioned;Ambulation/increased activity;Rest  -JY     Row Name 08/17/22 1051          Plan of Care Review    Plan of Care Reviewed With patient;spouse  -JY     Progress no change  -JY     Outcome  Evaluation Pt demonstrated improved participation and motivation in OT this date. Req'd gross min A for rolling L< > R in bed for toileting A, linen mgmt and sling placement in preparation for OOB t/f. Pt able to use RUE to grasp bed railing and pull self to L side while maintaining precautions with L shoulder movement, req'd increased A to maintain sidelying for extended toileting hygiene A. Grossly dependent for toileting hygiene and related cloth mgmt and linen change. Reviewed with pt and spouse optimal donna tech, position, sequencing for UBD requiring min A, pt deferred L axilla care and req'd dep care d/t dizziness experienced in sitting upright. RN aware. Pt deferred sling for comfort as pain managed. Dep for d/d offloading shoe to LLE prior to anticipated t/f OOB. Pt tolerated AAROM at L shoulder for FE limited to ~ 90 degrees before increased pain, further tolerated AROM at elbow, wrist, hand. Will continue to progress pt as tolerated during hospitalization, continue to recommend further rehab at d/c.  -JY     Row Name 08/17/22 1051          Therapy Assessment/Plan (OT)    Rehab Potential (OT) fair, will monitor progress closely  -JY     Criteria for Skilled Therapeutic Interventions Met (OT) yes;meets criteria;skilled treatment is necessary  -JY     Therapy Frequency (OT) daily  -JY     Row Name 08/17/22 1051          Vital Signs    Pre Systolic BP Rehab 140  -JY     Pre Treatment Diastolic BP 70  -JY     Pretreatment Heart Rate (beats/min) 65  -JY     Posttreatment Heart Rate (beats/min) 72  -JY     Pre SpO2 (%) 96  -JY     O2 Delivery Pre Treatment supplemental O2  -JY     O2 Delivery Intra Treatment supplemental O2  -JY     Post SpO2 (%) 94  -JY     O2 Delivery Post Treatment supplemental O2  -JY     Pre Patient Position Supine  -JY     Intra Patient Position Side Lying  -JY     Post Patient Position Sitting  -JY     Row Name 08/17/22 1051          Positioning and Restraints    Pre-Treatment Position in  bed  -JY     Post Treatment Position chair  -JY     In Chair notified nsg;reclined;call light within reach;encouraged to call for assist;exit alarm on;with family/caregiver;waffle cushion;on mechanical lift sling;legs elevated  LUE supported with rolled blanket to avoid ABDuction  -JY           User Key  (r) = Recorded By, (t) = Taken By, (c) = Cosigned By    Initials Name Provider Type    Guerline Corey OT Occupational Therapist               Outcome Measures     Row Name 08/17/22 1100          How much help from another is currently needed...    Putting on and taking off regular lower body clothing? 1  -JY     Bathing (including washing, rinsing, and drying) 1  -JY     Toileting (which includes using toilet bed pan or urinal) 1  -JY     Putting on and taking off regular upper body clothing 3  -JY     Taking care of personal grooming (such as brushing teeth) 3  -JY     Eating meals 2  -JY     AM-PAC 6 Clicks Score (OT) 11  -JY     Row Name 08/17/22 1042          How much help from another person do you currently need...    Turning from your back to your side while in flat bed without using bedrails? 3  -HP     Moving from lying on back to sitting on the side of a flat bed without bedrails? 2  -HP     Moving to and from a bed to a chair (including a wheelchair)? 1  -HP     Standing up from a chair using your arms (e.g., wheelchair, bedside chair)? 1  -HP     Climbing 3-5 steps with a railing? 1  -HP     To walk in hospital room? 1  -HP     AM-PAC 6 Clicks Score (PT) 9  -HP     Highest level of mobility 3 --> Sat at edge of bed  -HP     Row Name 08/17/22 1100 08/17/22 1042       Functional Assessment    Outcome Measure Options AM-PAC 6 Clicks Daily Activity (OT)  -JY AM-PAC 6 Clicks Basic Mobility (PT)  -HP          User Key  (r) = Recorded By, (t) = Taken By, (c) = Cosigned By    Initials Name Provider Type    Guerline Corey, OT Occupational Therapist    HP Thelma Rich, PT Physical Therapist                 Occupational Therapy Education                 Title: PT OT SLP Therapies (In Progress)     Topic: Occupational Therapy (In Progress)     Point: ADL training (In Progress)     Description:   Instruct learner(s) on proper safety adaptation and remediation techniques during self care or transfers.   Instruct in proper use of assistive devices.              Learning Progress Summary           Patient Acceptance, E,D, NR by JODELL at 8/17/2022 0925   Family Acceptance, E, VU,NR by AR at 8/15/2022 0959      Show all documentation for this point (12)                 Point: Home exercise program (In Progress)     Description:   Instruct learner(s) on appropriate technique for monitoring, assisting and/or progressing therapeutic exercises/activities.              Learning Progress Summary           Patient Acceptance, E,D, NR by JODELL at 8/17/2022 0925   Family Acceptance, E, VU,NR by AR at 8/15/2022 0959      Show all documentation for this point (12)                 Point: Precautions (In Progress)     Description:   Instruct learner(s) on prescribed precautions during self-care and functional transfers.              Learning Progress Summary           Patient Acceptance, E,D, NR by JODELL at 8/17/2022 0925   Family Acceptance, E, VU,NR by AR at 8/15/2022 0959      Show all documentation for this point (12)                 Point: Body mechanics (In Progress)     Description:   Instruct learner(s) on proper positioning and spine alignment during self-care, functional mobility activities and/or exercises.              Learning Progress Summary           Patient Acceptance, E,D, NR by JODELL at 8/17/2022 0925   Family Acceptance, E, VU,NR by AR at 8/15/2022 0959      Show all documentation for this point (8)                             User Key     Initials Effective Dates Name Provider Type Discipline    AR 06/16/21 -  Nathalia Osborne, OT Occupational Therapist OT    DAKOTA 06/16/21 -  Guerline Lazcano OT Occupational Therapist OT               OT Recommendation and Plan  Therapy Frequency (OT): daily  Plan of Care Review  Plan of Care Reviewed With: patient, spouse  Progress: no change  Outcome Evaluation: Pt demonstrated improved participation and motivation in OT this date. Req'd gross min A for rolling L< > R in bed for toileting A, linen mgmt and sling placement in preparation for OOB t/f. Pt able to use RUE to grasp bed railing and pull self to L side while maintaining precautions with L shoulder movement, req'd increased A to maintain sidelying for extended toileting hygiene A. Grossly dependent for toileting hygiene and related cloth mgmt and linen change. Reviewed with pt and spouse optimal donna tech, position, sequencing for UBD requiring min A, pt deferred L axilla care and req'd dep care d/t dizziness experienced in sitting upright. RN aware. Pt deferred sling for comfort as pain managed. Dep for d/d offloading shoe to LLE prior to anticipated t/f OOB. Pt tolerated AAROM at L shoulder for FE limited to ~ 90 degrees before increased pain, further tolerated AROM at elbow, wrist, hand. Will continue to progress pt as tolerated during hospitalization, continue to recommend further rehab at d/c.     Time Calculation:    Time Calculation- OT     Row Name 08/17/22 1105             Time Calculation- OT    OT Start Time 0925  -JY      OT Received On 08/17/22  -JY      OT Goal Re-Cert Due Date 08/25/22  -JY              Timed Charges    20557 - OT Therapeutic Exercise Minutes 13  -JY      72299 - OT Therapeutic Activity Minutes 6  -JY      08936 - OT Self Care/Mgmt Minutes 19  -JY              Total Minutes    Timed Charges Total Minutes 38  -JY       Total Minutes 38  -JY            User Key  (r) = Recorded By, (t) = Taken By, (c) = Cosigned By    Initials Name Provider Type    Guerline Corey OT Occupational Therapist              Therapy Charges for Today     Code Description Service Date Service Provider Modifiers Qty    41535270584   OT THER PROC EA 15 MIN 8/17/2022 Guerline Lazcano OT GO 1    [id number]  OT THERAPEUTIC ACT EA 15 MIN 8/17/2022 Guerline Lazcano OT GO 1    [id number]  OT SELF CARE/MGMT/TRAIN EA 15 MIN 8/17/2022 Guerline Lazcano OT GO 1               Guerline Lazcano OT  8/17/2022

## 2022-08-17 NOTE — PROGRESS NOTES
"   LOS: 21 days    Patient Care Team:  Farooq Painter MD as PCP - General (Family Medicine)  Jermaine Hernandez MD as Consulting Physician (Cardiology)    Chief Complaint:   77-year-old male  left foot to infection has been treated with antibiotic.  Initial creatinine has been within acceptable range, patient received IV vancomycin on starting 7/27/2022, was continued until 8/4/2022 at that time the creatinine started to go up to 2.02 patient's antibiotic was changed, taken off the vancomycin at that time creatinine started to get improved 1.8 slowly over the last 3 days creatinine has been slowly creeping up.  Patient has a past medical history of CAD, CABG, hyperlipidemia, diabetes type 2, hypertension, hypothyroidism, peripheral vascular disease, sick sinus syndrome with pacemaker placement. creatinine 2.07 BUN of 51, albumin 2.9, white count 14.2, platelets 209 normal. Patient is on ampicillin IV, and IV Solu-Medrol 60 mg.  Subjective     Interval History:   Mild increase in creatinine noted.  Review of Systems:   Complain generalized weakness other than that all other systems are negative.    Objective     Vital Sign Min/Max for last 24 hours  Temp  Min: 96.7 °F (35.9 °C)  Max: 98.4 °F (36.9 °C)   BP  Min: 133/55  Max: 157/84   Pulse  Min: 62  Max: 69   Resp  Min: 17  Max: 20   SpO2  Min: 92 %  Max: 96 %   Flow (L/min)  Min: 2  Max: 4   No data recorded     Flowsheet Rows    Flowsheet Row First Filed Value   Admission Height 190.5 cm (75\") Documented at 07/27/2022 1848   Admission Weight 98 kg (216 lb) Documented at 07/27/2022 1848          I/O this shift:  In: -   Out: 100 [Urine:100]  I/O last 3 completed shifts:  In: 115 [P.O.:115]  Out: 725 [Urine:725]    Physical Exam:  General Appearance: Awake alert oriented  male sitting comfortably in chair.  Eyes: PER, EOMI.  Neck: Supple no JVD.  Lungs: Clear auscultation, no rales rhonchi's, equal chest movement, nonlabored.  Heart: No gallop, murmur, " rub, RRR.  Abdomen: Soft, nontender, positive bowel sounds, no organomegaly.  Extremities: No edema, no cyanosis.  Left toe in bandage  Neuro: No focal deficit, moving all extremities, alert oriented X 3   no suprapubic fullness or tenderness  WBC WBC   Date Value Ref Range Status   08/15/2022 9.63 3.40 - 10.80 10*3/mm3 Final      HGB Hemoglobin   Date Value Ref Range Status   08/15/2022 10.6 (L) 13.0 - 17.7 g/dL Final      HCT Hematocrit   Date Value Ref Range Status   08/15/2022 32.6 (L) 37.5 - 51.0 % Final      Platlets No results found for: LABPLAT   MCV MCV   Date Value Ref Range Status   08/15/2022 90.1 79.0 - 97.0 fL Final          Sodium Sodium   Date Value Ref Range Status   08/17/2022 137 136 - 145 mmol/L Final   08/15/2022 138 136 - 145 mmol/L Final      Potassium Potassium   Date Value Ref Range Status   08/17/2022 4.6 3.5 - 5.2 mmol/L Final   08/15/2022 4.9 3.5 - 5.2 mmol/L Final     Comment:     Slight hemolysis detected by analyzer. Results may be affected.      Chloride Chloride   Date Value Ref Range Status   08/17/2022 96 (L) 98 - 107 mmol/L Final   08/15/2022 97 (L) 98 - 107 mmol/L Final      CO2 CO2   Date Value Ref Range Status   08/17/2022 28.0 22.0 - 29.0 mmol/L Final   08/15/2022 27.0 22.0 - 29.0 mmol/L Final      BUN BUN   Date Value Ref Range Status   08/17/2022 78 (H) 8 - 23 mg/dL Final   08/15/2022 64 (H) 8 - 23 mg/dL Final      Creatinine Creatinine   Date Value Ref Range Status   08/17/2022 2.66 (H) 0.76 - 1.27 mg/dL Final   08/15/2022 2.16 (H) 0.76 - 1.27 mg/dL Final      Calcium Calcium   Date Value Ref Range Status   08/17/2022 8.2 (L) 8.6 - 10.5 mg/dL Final   08/15/2022 8.8 8.6 - 10.5 mg/dL Final      PO4 No results found for: CAPO4   Albumin Albumin   Date Value Ref Range Status   08/15/2022 3.60 3.50 - 5.20 g/dL Final      Magnesium No results found for: MG   Uric Acid No results found for: URICACID        Results Review:     I reviewed the patient's new clinical  results.    amLODIPine, 10 mg, Oral, Daily  ampicillin-sulbactam, 3 g, Intravenous, Q8H  apixaban, 10 mg, Oral, Q12H   Followed by  [START ON 8/21/2022] apixaban, 5 mg, Oral, Q12H  aspirin, 81 mg, Oral, Daily  cetirizine, 5 mg, Oral, Daily  insulin detemir, 24 Units, Subcutaneous, Nightly  insulin lispro, 0-9 Units, Subcutaneous, TID AC  levothyroxine, 50 mcg, Oral, Q AM  metoprolol succinate XL, 50 mg, Oral, Q24H  polyethylene glycol, 17 g, Oral, Daily  senna-docusate sodium, 2 tablet, Oral, BID  sodium chloride, 10 mL, Intravenous, Q12H  sodium chloride, 10 mL, Intravenous, Q12H  vitamin D3, 5,000 Units, Oral, Daily           Medication Review: Reviewed    Assessment & Plan       Sepsis ([facility])    Type 2 diabetes mellitus ([facility])    Hypertension    Hyperlipidemia    Hypothyroidism (acquired)    S/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 Au    SSS (sick sinus syndrome) ([facility])    Dislocation of left shoulder joint, initial encounter    Cellulitis in diabetic foot ([facility])    Fall    Lactic acidosis    Proximal phalanx fracture of the second digit extending into the second metatarsal joint    Acute renal failure (ARF) ([facility])    Acute systolic heart failure ([facility])    DVT, bilateral lower limbs ([facility])    Acute respiratory failure with hypoxia ([facility])        1.  Acute kidney injury: Baseline creatinine 1.02-1.05.  Patient was admitted and was started on antibiotic for osteomyelitis.  He received vancomycin which was started on 7/27/2022 until 8/4/2022 due to acute rise in creatinine to 2.07.  Renal artery duplex negative for renal artery stenosis.  Ultrasound kidney showed normal size kidney right 11.6 cm, left 11.1 cm.  Normal bladder.  CK1 71, C4 complement 34, C3 complement 137, TRISH negative, urine eos negative, urine protein 57, urine creatinine 150,Normal ultrasound kidney without  any obstruction.  No renal artery stenosis.  2.  Osteomyelitis left toe.  3.  Type 2 diabetes.  4.  History of hypertension  5.  History of  hyperlipidemia.  6.  CAD s/p CABG 3/22/2019 by Dr. Romeos.  7.  Sick sinus syndrome.  8.  Proteinuria: Protein creatinine ratio 0.38 g  Recommendations plan.  Mild increase in creatinine, nonoliguric. monitor closely no dialysis indicated at this time  Avoid nephrotoxic medications.  Keep systolic blood pressure greater than 100.  Check volume status.  Check labs in the morning.  Daily evaluation for renal replacement therapy will be done.  Adjust medication for the new GFR.  High risk patient multiple medical problems.  Discussed with the patient and the family member in the room  Daphne Lal MD  08/17/22  13:54 EDT

## 2022-08-17 NOTE — CASE MANAGEMENT/SOCIAL WORK
Continued Stay Note  [facility]     Patient Name: Jermaine Singh  MRN: [id number]  Today's Date: 8/17/2022    Admit Date: 7/27/2022     Discharge Plan     Row Name 08/17/22 1040       Plan    Plan Comments Pt remains unable to transfer to [facility] due to not being medically ready. Sheila with [facility] has been udpated. CM to follow.               Discharge Codes    No documentation.               Expected Discharge Date and Time     Expected Discharge Date Expected Discharge Time    Aug 18, 2022             Norah Rivera RN

## 2022-08-17 NOTE — PROGRESS NOTES
Cardiothoracic Surgery Progress Note      POD #: 15-left great toe transmetatarsal amputation primary     LOS: 21 days      Subjective:  awake and alert    Objective:  Vital Signs vital signs below noted T-max past 24 hours 98.4 °F  Temp:  [97.6 °F (36.4 °C)-98.4 °F (36.9 °C)] 98.4 °F (36.9 °C)  Heart Rate:  [63-83] 63  Resp:  [17-20] 18  BP: (129-158)/(55-96) 133/55    Physical Exam:   General Appearance: Awake and seems oriented   Lungs:   Heart:   Skin: Left second toe middle phalangeal joint space ulceration has exposed bone   Incision: Amputation site dressing change.  Suture intact skin margin viable skin flaps are viable     Results:  Results from last 7 days   Lab Units 08/15/22  0615   WBC 10*3/mm3 9.63   HEMOGLOBIN g/dL 10.6*   HEMATOCRIT % 32.6*   PLATELETS 10*3/mm3 199     Results from last 7 days   Lab Units 08/15/22  0615   SODIUM mmol/L 138   POTASSIUM mmol/L 4.9   CHLORIDE mmol/L 97*   CO2 mmol/L 27.0   BUN mg/dL 64*   CREATININE mg/dL 2.16*   GLUCOSE mg/dL 225*   CALCIUM mg/dL 8.8         Assessment: #1 postop day 15 left great toe transmetatarsal amputation primary closure  2.  Exposed middle phalangeal joint space left second toe for chronic diabetic ulceration and hammertoe phenomena.  Stable at this time  3.  Status post remote coronary bypass grafting  4.  Status post remote pacemaker placement for sick sinus syndrome  5.  Recent fall shoulder dislocation reduced in the emergency department  6.  Endovascular angioplasty stenting right lower extremity Dr. Hernandez 2019  7.  Flash pulmonary edema cardiac related      Plan: Continue daily dressing change amputation site as well as dressing over the left second toe ulceration the middle phalangeal joint space.  Antibiotics per infectious disease.  Medical manage per hospitalist/cardiologist.  Renal medicine following for elevated creatinine and BUN      Gopal Márquez MD - 08/17/22 - 05:43 EDT

## 2022-08-17 NOTE — PLAN OF CARE
Goal Outcome Evaluation:            Patient alert and oriented. Continues to have IV antbitoics. NWB to RLE.

## 2022-08-17 NOTE — PROGRESS NOTES
[facility] Medicine Services  PROGRESS NOTE    Patient Name: Jermaine Singh  : 1945  MRN: [id number]    Date of Admission: 2022  Primary Care Physician: Farooq Painter MD    Subjective   Subjective     CC:  F/u osteomyelitis    HPI:  Did diuresis after lasix yesterday, but he's not sure about improvement in his breathing.  However, he is down to 2.5L currently.  He feels no significant changes compared to yesterday.      ROS:  Gen- No fevers, chills  CV- + chest pain, palpitations  Resp- No cough, + dyspnea  GI- No N/V/D, abd pain    Objective   Objective     Vital Signs:   Temp:  [96.7 °F (35.9 °C)-98.4 °F (36.9 °C)] 96.7 °F (35.9 °C)  Heart Rate:  [62-69] 62  Resp:  [17-20] 18  BP: (133-157)/(55-93) 141/72  Flow (L/min):  [2-4] 2     Physical Exam:  Constitutional: awake, alert, sitting up in chair, looks better today overall.  HENT: NCAT, mucous membranes moist  Respiratory: decreased breath sounds bilateral bases, some crackles, respiratory effort normal on 2.5L NC, about the same  Cardiovascular: RRR, no murmurs, rubs, or gallops  Gastrointestinal: Positive bowel sounds, soft, non-tender, mild distention  Musculoskeletal: trace bilateral ankle edema, left foot bandaged, clean and dry, stable  Psychiatric: Appropriate affect, cooperative  Neurologic: Oriented x 3, no deficits  Skin: No rashes      Results Reviewed:  LAB RESULTS:      Lab 08/15/22  0615 22  1132 22  0116 22  2310 22  1818 22  1224 22  0941 22  0636 22  0023 08/10/22  1638 08/10/22  1638   WBC 9.63  --   --  14.07*  --  14.42* 13.76* 11.75* 15.41*  --  15.79*   HEMOGLOBIN 10.6*  --   --  10.2*  --  10.5* 11.2* 11.0* 11.8*  --  11.7*   HEMATOCRIT 32.6*  --   --  31.2*  --  31.4* 35.5* 33.7* 35.7*  --  36.2*   PLATELETS 199  --   --  226  --  209 236 250 252  --  309   NEUTROS ABS  --   --   --  11.61*  --  12.08*  --  9.88* 13.43*  --   --    IMMATURE GRANS  (ABS)  --   --   --  0.06*  --  0.05  --  0.06* 0.07*  --   --    LYMPHS ABS  --   --   --  1.13  --  1.06  --  0.76 0.60*  --   --    MONOS ABS  --   --   --  1.17*  --  1.17*  --  1.01* 1.26*  --   --    EOS ABS  --   --   --  0.04  --  0.01  --  0.00 0.00  --   --    MCV 90.1  --   --  89.4  --  88.2 93.4 90.6 89.0  --  90.5   PROCALCITONIN  --   --   --   --   --   --   --   --   --   --  0.17   LACTATE  --   --   --   --   --   --   --  1.2  --   --   --    PROTIME  --   --   --   --   --  13.8  --   --  13.6  --   --    APTT  --   --   --   --   --  29.7*  --   --  33.1*  --   --    HEPARIN ANTI-XA  --  0.10* 0.49  --  0.37 0.10*  --  0.10* 0.10*   < >  --     < > = values in this interval not displayed.         Lab 08/17/22  0507 08/15/22  0615 08/13/22  2310 08/13/22  0730 08/12/22  1018 08/12/22  0941 08/11/22  0636 08/10/22  1638   SODIUM 137 138 139 143  --  146* 145 145   POTASSIUM 4.6 4.9 4.0 3.8  --  4.2 4.1 4.5   CHLORIDE 96* 97* 99 104  --  103 106 108*   CO2 28.0 27.0 27.0 28.0  --  26.0 26.0 22.0   ANION GAP 13.0 14.0 13.0 11.0  --  17.0* 13.0 15.0   BUN 78* 64* 51* 51*  --  47* 46* 43*   CREATININE 2.66* 2.16* 2.07* 1.94*  --  1.84* 1.80* 1.82*   EGFR 24.0* 30.8* 32.4* 35.0*  --  37.3* 38.3* 37.8*   GLUCOSE 232* 225* 151* 63*  --  206* 185* 236*   CALCIUM 8.2* 8.8 8.5* 8.4*  --  8.8 8.9 8.7   IONIZED CALCIUM  --   --   --   --  1.22  --   --   --    MAGNESIUM  --   --  2.7*  --   --  2.2 2.3 2.5*   PHOSPHORUS  --  5.5*  --   --   --  4.4  --   --          Lab 08/15/22  0615 08/13/22 2310   TOTAL PROTEIN  --  6.1   ALBUMIN 3.60 2.90*   GLOBULIN  --  3.2   ALT (SGPT)  --  13   AST (SGOT)  --  12   BILIRUBIN  --  0.3   ALK PHOS  --  60         Lab 08/13/22  2310 08/13/22  1224 08/13/22  0730 08/12/22  1653 08/12/22  1025 08/11/22  0636 08/11/22  0023 08/10/22  2239 08/10/22  1638   PROBNP 31,919.0*  --   --   --   --  30,799.0*  --   --  21,299.0*   TROPONIN T 0.230* 0.229* 0.230* 0.206* 0.157*  0.146*  --    < > 0.108*   PROTIME  --  13.8  --   --   --   --  13.6  --   --    INR  --  1.07  --   --   --   --  1.05  --   --     < > = values in this interval not displayed.                 Lab 08/11/22  0041 08/10/22  1741 08/10/22  1633   PH, ARTERIAL 7.422 7.382 7.262*   PCO2, ARTERIAL 38.6 40.3 55.7*   PO2 ART 67.1* 106.0 50.7*   FIO2 32 60 80   HCO3 ART 25.2 23.9 25.1   BASE EXCESS ART 0.8 -1.1* -2.6*   CARBOXYHEMOGLOBIN 0.9 1.0 1.4     Brief Urine Lab Results  (Last result in the past 365 days)      Color   Clarity   Blood   Leuk Est   Nitrite   Protein   CREAT   Urine HCG        08/14/22 1818             149.9               Microbiology Results Abnormal     Procedure Component Value - Date/Time    Eosinophil Smear - Urine, Urine, Clean Catch [[id number]]  (Normal) Collected: 08/14/22 1818    Lab Status: Final result Specimen: Urine, Clean Catch Updated: 08/14/22 2101     Eosinophil Smear 0 % EOS/100 Cells     Narrative:      No eosinophil seen    Anaerobic Culture - Tissue, Toe, Left [[id number]] Collected: 08/02/22 1027    Lab Status: Final result Specimen: Tissue from Toe, Left Updated: 08/07/22 0858     Anaerobic Culture No anaerobes isolated at 5 days    Blood Culture - Blood, Hand, Left [[id number]]  (Normal) Collected: 07/27/22 2040    Lab Status: Final result Specimen: Blood from Hand, Left Updated: 08/01/22 2132     Blood Culture No growth at 5 days    Blood Culture - Blood, Hand, Right [[id number]]  (Normal) Collected: 07/27/22 2030    Lab Status: Final result Specimen: Blood from Hand, Right Updated: 08/01/22 2132     Blood Culture No growth at 5 days    COVID PRE-OP / PRE-PROCEDURE SCREENING ORDER (NO ISOLATION) - Swab, Nasopharynx [[id number]]  (Normal) Collected: 07/27/22 2246    Lab Status: Final result Specimen: Swab from Nasopharynx Updated: 07/27/22 2357    Narrative:      The following orders were created for panel order COVID PRE-OP / PRE-PROCEDURE SCREENING ORDER (NO ISOLATION) - Swab,  Nasopharynx.  Procedure                               Abnormality         Status                     ---------                               -----------         ------                     COVID-19 and FLU A/B PCR...[[id number]]  Normal              Final result                 Please view results for these tests on the individual orders.    COVID-19 and FLU A/B PCR - Swab, Nasopharynx [[id number]]  (Normal) Collected: 07/27/22 2246    Lab Status: Final result Specimen: Swab from Nasopharynx Updated: 07/27/22 8197     COVID19 Not Detected     Influenza A PCR Not Detected     Influenza B PCR Not Detected    Narrative:      Fact sheet for providers: https://www.fda.gov/media/830328/download    Fact sheet for patients: https://www.fda.gov/media/397662/download    Test performed by PCR.          XR Chest 1 View    Result Date: 8/16/2022  FRONTAL VIEW OF THE CHEST CLINICAL INDICATION: Dyspnea COMPARISON: August 14, 2022 FINDINGS: Lines: Left chest wall pacemaker. Intracardiac leads appear intact. Right upper extremity PICC with the tip near the superior cavoatrial junction. Interval increase in patchy airspace opacities, right greater than left. Left basilar opacity silhouetting left hemidiaphragm with possible left effusion. Heart size is stable. Osseous structures are unremarkable.     Impression: Increasing airspace opacities and left basilar opacity with likely left effusion. Findings may be related to worsening pneumonia or edema.  Support devices as above. Electronically signed by:  Shanel Molina  8/16/2022 4:06 AM Mountain Time    Duplex Renal Artery - Bilateral Complete CAR    Result Date: 8/16/2022  US RENAL ARTERIES. DUPLEX RENAL ARTERY BILATERAL COMPLETE CAR-  History: Acute kidney injury in the setting of peripheral vascular disease as well as history of CABG, rule out WILMA.  Comparison: None.  Technique: Two-dimensional grayscale, color flow, pulse wave spectral Doppler examination of the right and left renal  circulation. The study is not optimized for the examination of the right and left renal parenchyma.  Limitations: Bowel gas and patient's body habitus. The patient was short of breath and unable to cooperate with breath-holding. This also limited the visualization of the renal arteries.  Findings:  Abdominal aorta: AP diameter of the Proximal segment: 1.7 cm. This is normal. Peak systolic velocity in the suprarenal aorta is noted below. The spectral Doppler waveform shows Low resistance waveform. There is spectral broadening.  Renal artery ratios: Right: Within normal limits. Left: Within normal limits.  Right kidney: Limited examination Size: Normal Echogenicity: within normal limits to the extent seen. Other findings: Not applicable.  Left kidney: Limited examination Size: Normal Echogenicity: within normal limits to the extent seen. Other findings: A 1.3 x 1.4 cm cyst is mentioned in the note but no corresponding images are available for my review of the study.  Right renal circulation: Main right renal artery: Peak systolic velocity criteria support absence of hemodynamically significant renal artery stenosis. The resistive index is elevated. Acceleration time at the hilum is borderline above the upper limits of normal.  Right renal vein is patent.  Left renal circulation: Main left renal artery: Peak systolic velocity criteria support absence of hemodynamically significant renal artery stenosis. The resistive index is elevated. Acceleration time at the hilum is normal.  Left renal vein is patent.  Other findings: None.      Impression: Impression: No sonographic evidence of clinically significant stenosis of the right or the left renal artery on this exam.  The resistive indices are elevated bilaterally that in combination with the findings suggest parenchymal renal disease.  Please note that the study is not optimized for evaluation of the right and left renal parenchyma. A separate dedicated ultrasound  examination should be performed for that purpose.  This report was finalized on 8/16/2022 1:23 PM by Sinan Dinero MD.        Results for orders placed during the hospital encounter of 07/27/22    Adult Transthoracic Echo Complete W/ Cont if Necessary Per Protocol    Interpretation Summary  · Left ventricular ejection fraction appears to be 46 - 50%. Left ventricular systolic function is low normal.  · Left ventricular septal hypokinesis  · No significant valvular heart disease      I have reviewed the medications:  Scheduled Meds:amLODIPine, 10 mg, Oral, Daily  ampicillin-sulbactam, 3 g, Intravenous, Q8H  apixaban, 10 mg, Oral, Q12H   Followed by  [START ON 8/21/2022] apixaban, 5 mg, Oral, Q12H  aspirin, 81 mg, Oral, Daily  cetirizine, 5 mg, Oral, Daily  insulin detemir, 24 Units, Subcutaneous, Nightly  insulin lispro, 0-9 Units, Subcutaneous, TID AC  levothyroxine, 50 mcg, Oral, Q AM  metoprolol succinate XL, 50 mg, Oral, Q24H  polyethylene glycol, 17 g, Oral, Daily  senna-docusate sodium, 2 tablet, Oral, BID  sodium chloride, 10 mL, Intravenous, Q12H  sodium chloride, 10 mL, Intravenous, Q12H  vitamin D3, 5,000 Units, Oral, Daily      Continuous Infusions:   PRN Meds:.•  acetaminophen **OR** acetaminophen **OR** acetaminophen  •  bisacodyl  •  dextrose  •  dextrose  •  glucagon (human recombinant)  •  HYDROcodone-acetaminophen  •  ipratropium-albuterol  •  nitroglycerin  •  ondansetron **OR** ondansetron  •  phenol  •  prochlorperazine  •  sennosides-docusate  •  simethicone  •  [COMPLETED] Insert peripheral IV **AND** sodium chloride  •  sodium chloride  •  sodium chloride    Assessment & Plan   Assessment & Plan     Active Hospital Problems    Diagnosis  POA   • **Sepsis (HCC) [A41.9]  Yes     Priority: Medium   • Cellulitis in diabetic foot (HCC) [E11.628, L03.119]  Yes     Priority: Medium   • Acute renal failure (ARF) (HCC) [N17.9]  Yes   • Acute systolic heart failure (HCC) [I50.21]  No   • DVT, bilateral  lower limbs ([facility]) [I82.403]  No   • Acute respiratory failure with hypoxia ([facility]) [J96.01]  No   • Proximal phalanx fracture of the second digit extending into the second metatarsal joint [S92.919A]  Yes   • Dislocation of left shoulder joint, initial encounter [S43.005A]  Yes   • Fall [W19.XXXA]  Yes   • Lactic acidosis [E87.2]  Yes   • SSS (sick sinus syndrome) ([facility]) [I49.5]  Yes   • Hyperlipidemia [E78.5]  Yes   • Hypertension [I10]  Yes   • S/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 Au [Z95.1]  Not Applicable   • Hypothyroidism (acquired) [E03.9]  Yes   • Type 2 diabetes mellitus ([facility]) [E11.9]  Yes      Resolved Hospital Problems   No resolved problems to display.        Brief Hospital Course to date:  Jermaine Singh is a 77 y.o. male with PMH significant for HTN, HLD, CAD s/p CABG, PVD s/p RLE stent, CKD III, insulin-dependent DMII and hypothyroidism. He was admitted to [facility] 7/27/22 for L shoulder dislocation after a fall as well as sepsis secondary to L great toe osteomyelitis.     L great toe cellulitis / osteomyelitis  Peripheral vascular disease   - Followed by Dr. Joseph of podiatry - was on PO Doxycycline outpatient  - Bone scan was concerning for osteomyelitis of L great toe, now s/p L great toe amputation by Dr. Márquez 8/2/22   - arterial duplex obtained. Cardiology did not recommend revascularization.  - tissue culture growing enterococcus faecalis and enterococcus casseliflavus  - ID following - changed to Unasyn, will need 6 weeks from 8/2/22 (9/13/2022)  - PICC in place  - remains stable today    Acute Hypoxic Respiratory Failure  Acute Systolic CHF  Elevated troponin  -Previosuly viewed CXR from 8/16.  Looked to have increasing edema and developing left effusion compared to previous.  -Has some mild acute systolic heart failure with EF of 46 to 50%, though cardiology note says more like 40% based on relook.  -Status post 2 mg of IV Bumex on 8/13 with good response and was able to be  weaned off of high flow to nasal cannula  - Got 40mg IV lasix 8/16.  Now down to 2.5L NC but Cr worse.  Will hold further diuresis today  - repeat CXR in AM  -VQ scan was read as low probability of PE  -Minimal troponin elevation peaked at 0.230, cardiology following and feels it is related to demand ischemia       Acute renal failure  - Baseline creatinine appears to be 0.9-1.2  -Creatinine continues to trend up, worse today at 2.66 after lasix yesterday.  - renal US normal, artery duplex normal  - appreciate renal assistance  - recheck BMP in AM.  If worsens, may be at risk for dialysis    Bilateral LE DVT's  -Initially placed on heparin drip which has now been transitioned to Eliquis  -VQ scan low probability     Fall   Anterior L shoulder dislocation  - s/p reduction in the ED  - Appreciate ortho (Dr. Gipson) assistance   - Recommend non-operative treatment. Sling for comfort.   - PT for range of motion exercises, avoid Abduction and external rotation. WBAT on LUE with walker or knee scooter  - Follow up with Dr. Gipson in 2-3 weeks   - stable today    Constipation  Nausea/Vomiting  -Some bowel movement yesterday, continue bowel regimen     Insulin-dependent DMII  - Hgb A1c 9.0%  - Got steroids on 8/14 and on 8/16, so worse control today.  Will adjust up insulin and watch for need to titrate down as steroid effect wears off      Hypertension  Coronary artery disease s/p CABG  SSS s/p PPM   - Cardiology has stopped ACEI and added Amlodipine 10mg daily   - Continue Metoprolol XL 50mg daily   - scheduled for 12/28/2022 1:30 pm with device check and should keep that appt.     Hypothyroid  - Continue levothyroxine    Expected Discharge Location and Transportation: rehab/[facility]  Expected Discharge Date: 8/22/22    DVT prophylaxis:  Medical and mechanical DVT prophylaxis orders are present.     AM-PAC 6 Clicks Score (PT): 9 (08/17/22 8706)     CODE STATUS:   Code Status and Medical Interventions:   Ordered at:  07/28/22 0000     Code Status (Patient has no pulse and is not breathing):    CPR (Attempt to Resuscitate)     Medical Interventions (Patient has pulse or is breathing):    Full Support       Hayder Vogel MD  08/17/22

## 2022-08-18 ENCOUNTER — APPOINTMENT (OUTPATIENT)
Dept: GENERAL RADIOLOGY | Facility: HOSPITAL | Age: 77
End: 2022-08-18

## 2022-08-18 LAB
ANION GAP SERPL CALCULATED.3IONS-SCNC: 13 MMOL/L (ref 5–15)
BUN SERPL-MCNC: 82 MG/DL (ref 8–23)
BUN/CREAT SERPL: 25.9 (ref 7–25)
CALCIUM SPEC-SCNC: 8.3 MG/DL (ref 8.6–10.5)
CHLORIDE SERPL-SCNC: 96 MMOL/L (ref 98–107)
CO2 SERPL-SCNC: 26 MMOL/L (ref 22–29)
CREAT SERPL-MCNC: 3.17 MG/DL (ref 0.76–1.27)
EGFRCR SERPLBLD CKD-EPI 2021: 19.4 ML/MIN/1.73
GLUCOSE BLDC GLUCOMTR-MCNC: 103 MG/DL (ref 70–130)
GLUCOSE BLDC GLUCOMTR-MCNC: 126 MG/DL (ref 70–130)
GLUCOSE BLDC GLUCOMTR-MCNC: 130 MG/DL (ref 70–130)
GLUCOSE BLDC GLUCOMTR-MCNC: 166 MG/DL (ref 70–130)
GLUCOSE BLDC GLUCOMTR-MCNC: 171 MG/DL (ref 70–130)
GLUCOSE BLDC GLUCOMTR-MCNC: 62 MG/DL (ref 70–130)
GLUCOSE BLDC GLUCOMTR-MCNC: 80 MG/DL (ref 70–130)
GLUCOSE SERPL-MCNC: 63 MG/DL (ref 65–99)
POTASSIUM SERPL-SCNC: 4.1 MMOL/L (ref 3.5–5.2)
SODIUM SERPL-SCNC: 135 MMOL/L (ref 136–145)

## 2022-08-18 PROCEDURE — 82962 GLUCOSE BLOOD TEST: CPT

## 2022-08-18 PROCEDURE — 99024 POSTOP FOLLOW-UP VISIT: CPT | Performed by: THORACIC SURGERY (CARDIOTHORACIC VASCULAR SURGERY)

## 2022-08-18 PROCEDURE — 99233 SBSQ HOSP IP/OBS HIGH 50: CPT | Performed by: FAMILY MEDICINE

## 2022-08-18 PROCEDURE — 94799 UNLISTED PULMONARY SVC/PX: CPT

## 2022-08-18 PROCEDURE — 25010000002 AMPICILLIN-SULBACTAM PER 1.5 G

## 2022-08-18 PROCEDURE — 80048 BASIC METABOLIC PNL TOTAL CA: CPT | Performed by: INTERNAL MEDICINE

## 2022-08-18 PROCEDURE — 71045 X-RAY EXAM CHEST 1 VIEW: CPT

## 2022-08-18 PROCEDURE — 94664 DEMO&/EVAL PT USE INHALER: CPT

## 2022-08-18 RX ORDER — BETHANECHOL CHLORIDE 10 MG/1
10 TABLET ORAL 3 TIMES DAILY
Status: DISCONTINUED | OUTPATIENT
Start: 2022-08-18 | End: 2022-08-26 | Stop reason: HOSPADM

## 2022-08-18 RX ORDER — BUMETANIDE 0.25 MG/ML
2 INJECTION INTRAMUSCULAR; INTRAVENOUS ONCE
Status: COMPLETED | OUTPATIENT
Start: 2022-08-18 | End: 2022-08-18

## 2022-08-18 RX ORDER — AMOXICILLIN AND CLAVULANATE POTASSIUM 875; 125 MG/1; MG/1
1 TABLET, FILM COATED ORAL EVERY 24 HOURS
Status: DISCONTINUED | OUTPATIENT
Start: 2022-08-18 | End: 2022-08-19

## 2022-08-18 RX ORDER — TAMSULOSIN HYDROCHLORIDE 0.4 MG/1
0.4 CAPSULE ORAL DAILY
Status: DISCONTINUED | OUTPATIENT
Start: 2022-08-18 | End: 2022-08-26 | Stop reason: HOSPADM

## 2022-08-18 RX ORDER — BUMETANIDE 0.25 MG/ML
2 INJECTION INTRAMUSCULAR; INTRAVENOUS EVERY 12 HOURS
Status: DISCONTINUED | OUTPATIENT
Start: 2022-08-18 | End: 2022-08-18

## 2022-08-18 RX ADMIN — SODIUM CHLORIDE 3 G: 900 INJECTION INTRAVENOUS at 02:59

## 2022-08-18 RX ADMIN — BUMETANIDE 2 MG: 0.25 INJECTION INTRAMUSCULAR; INTRAVENOUS at 15:44

## 2022-08-18 RX ADMIN — IPRATROPIUM BROMIDE AND ALBUTEROL SULFATE 3 ML: .5; 3 SOLUTION RESPIRATORY (INHALATION) at 12:21

## 2022-08-18 RX ADMIN — IPRATROPIUM BROMIDE AND ALBUTEROL SULFATE 3 ML: .5; 3 SOLUTION RESPIRATORY (INHALATION) at 22:14

## 2022-08-18 RX ADMIN — Medication 10 ML: at 20:22

## 2022-08-18 RX ADMIN — BETHANECHOL CHLORIDE 10 MG: 10 TABLET ORAL at 20:22

## 2022-08-18 RX ADMIN — BETHANECHOL CHLORIDE 10 MG: 10 TABLET ORAL at 15:44

## 2022-08-18 RX ADMIN — IPRATROPIUM BROMIDE AND ALBUTEROL SULFATE 3 ML: .5; 3 SOLUTION RESPIRATORY (INHALATION) at 00:19

## 2022-08-18 RX ADMIN — BENZOCAINE AND MENTHOL 1 LOZENGE: 15; 3.6 LOZENGE ORAL at 18:11

## 2022-08-18 RX ADMIN — LEVOTHYROXINE SODIUM 50 MCG: 50 TABLET ORAL at 05:26

## 2022-08-18 RX ADMIN — APIXABAN 10 MG: 5 TABLET, FILM COATED ORAL at 09:19

## 2022-08-18 RX ADMIN — AMLODIPINE BESYLATE 10 MG: 10 TABLET ORAL at 09:19

## 2022-08-18 RX ADMIN — METOPROLOL SUCCINATE 50 MG: 50 TABLET, EXTENDED RELEASE ORAL at 09:19

## 2022-08-18 RX ADMIN — IPRATROPIUM BROMIDE AND ALBUTEROL SULFATE 3 ML: .5; 3 SOLUTION RESPIRATORY (INHALATION) at 08:36

## 2022-08-18 RX ADMIN — Medication 10 ML: at 09:20

## 2022-08-18 RX ADMIN — SENNOSIDES AND DOCUSATE SODIUM 2 TABLET: 50; 8.6 TABLET ORAL at 20:22

## 2022-08-18 RX ADMIN — Medication 15 G: at 05:26

## 2022-08-18 RX ADMIN — ASPIRIN 81 MG CHEWABLE TABLET 81 MG: 81 TABLET CHEWABLE at 09:19

## 2022-08-18 RX ADMIN — AMOXICILLIN AND CLAVULANATE POTASSIUM 1 TABLET: 875; 125 TABLET, FILM COATED ORAL at 15:44

## 2022-08-18 RX ADMIN — CETIRIZINE HYDROCHLORIDE 5 MG: 10 TABLET, FILM COATED ORAL at 09:19

## 2022-08-18 RX ADMIN — Medication 5000 UNITS: at 09:19

## 2022-08-18 RX ADMIN — TAMSULOSIN HYDROCHLORIDE 0.4 MG: 0.4 CAPSULE ORAL at 13:54

## 2022-08-18 RX ADMIN — SENNOSIDES AND DOCUSATE SODIUM 2 TABLET: 50; 8.6 TABLET ORAL at 09:20

## 2022-08-18 RX ADMIN — APIXABAN 10 MG: 5 TABLET, FILM COATED ORAL at 20:21

## 2022-08-18 RX ADMIN — IPRATROPIUM BROMIDE AND ALBUTEROL SULFATE 3 ML: .5; 3 SOLUTION RESPIRATORY (INHALATION) at 16:16

## 2022-08-18 RX ADMIN — IPRATROPIUM BROMIDE AND ALBUTEROL SULFATE 3 ML: .5; 3 SOLUTION RESPIRATORY (INHALATION) at 03:57

## 2022-08-18 NOTE — PROGRESS NOTES
[facility] Medicine Services  PROGRESS NOTE    Patient Name: Jermaine Singh  : 1945  MRN: [id number]    Date of Admission: 2022  Primary Care Physician: Farooq Painter MD    Subjective   Subjective     CC:  F/U osteomyelitis    HPI:  Patient seen and examined. Daughter at bedside. Has required I/O cath per staff. Discussed further elevation of Cr. BG low this AM. Pulled his PICC out of place overnight.     ROS:  Gen- No fevers, chills  CV- No chest pain, palpitations  Resp- No cough, dyspnea  GI- No N/V/D, abd pain    Objective   Objective     Vital Signs:   Temp:  [96.9 °F (36.1 °C)-97.8 °F (36.6 °C)] 97.5 °F (36.4 °C)  Heart Rate:  [61-66] 64  Resp:  [16-22] 16  BP: (108-160)/(44-94) 115/74  Flow (L/min):  [2-4] 2.5     Physical Exam:  Constitutional: No acute distress, awake, alert, lying flat in bed   HENT: NCAT, mucous membranes moist  Respiratory: Decreased in bases/Occ Rales, respiratory effort normal 2.5L NC  Cardiovascular: RRR, no murmurs, rubs, or gallops  Gastrointestinal: Positive bowel sounds, soft, nontender, nondistended  Musculoskeletal: No bilateral ankle edema, PICC has been removed RUE  Psychiatric: Appropriate affect, cooperative  Neurologic: Oriented x 3, speech clear, ALBARADO  Skin: No rashes    Results Reviewed:  LAB RESULTS:      Lab 08/15/22  0615 22  1132 22  0116 22  2310 22  1818 22  1224 22  0941   WBC 9.63  --   --  14.07*  --  14.42* 13.76*   HEMOGLOBIN 10.6*  --   --  10.2*  --  10.5* 11.2*   HEMATOCRIT 32.6*  --   --  31.2*  --  31.4* 35.5*   PLATELETS 199  --   --  226  --  209 236   NEUTROS ABS  --   --   --  11.61*  --  12.08*  --    IMMATURE GRANS (ABS)  --   --   --  0.06*  --  0.05  --    LYMPHS ABS  --   --   --  1.13  --  1.06  --    MONOS ABS  --   --   --  1.17*  --  1.17*  --    EOS ABS  --   --   --  0.04  --  0.01  --    MCV 90.1  --   --  89.4  --  88.2 93.4   PROTIME  --   --   --   --   --  13.8   --    APTT  --   --   --   --   --  29.7*  --    HEPARIN ANTI-XA  --  0.10* 0.49  --  0.37 0.10*  --          Lab 08/18/22  0327 08/17/22  0507 08/15/22  0615 08/13/22 2310 08/13/22  0730 08/12/22  1018 08/12/22  0941   SODIUM 135* 137 138 139 143  --  146*   POTASSIUM 4.1 4.6 4.9 4.0 3.8  --  4.2   CHLORIDE 96* 96* 97* 99 104  --  103   CO2 26.0 28.0 27.0 27.0 28.0  --  26.0   ANION GAP 13.0 13.0 14.0 13.0 11.0  --  17.0*   BUN 82* 78* 64* 51* 51*  --  47*   CREATININE 3.17* 2.66* 2.16* 2.07* 1.94*  --  1.84*   EGFR 19.4* 24.0* 30.8* 32.4* 35.0*  --  37.3*   GLUCOSE 63* 232* 225* 151* 63*  --  206*   CALCIUM 8.3* 8.2* 8.8 8.5* 8.4*  --  8.8   IONIZED CALCIUM  --   --   --   --   --  1.22  --    MAGNESIUM  --   --   --  2.7*  --   --  2.2   PHOSPHORUS  --   --  5.5*  --   --   --  4.4         Lab 08/15/22  0615 08/13/22  2310   TOTAL PROTEIN  --  6.1   ALBUMIN 3.60 2.90*   GLOBULIN  --  3.2   ALT (SGPT)  --  13   AST (SGOT)  --  12   BILIRUBIN  --  0.3   ALK PHOS  --  60         Lab 08/13/22  2310 08/13/22  1224 08/13/22  0730 08/12/22  1653 08/12/22  1025   PROBNP 31,919.0*  --   --   --   --    TROPONIN T 0.230* 0.229* 0.230* 0.206* 0.157*   PROTIME  --  13.8  --   --   --    INR  --  1.07  --   --   --                  Brief Urine Lab Results  (Last result in the past 365 days)      Color   Clarity   Blood   Leuk Est   Nitrite   Protein   CREAT   Urine HCG        08/14/22 1818             149.9               Microbiology Results Abnormal     Procedure Component Value - Date/Time    Eosinophil Smear - Urine, Urine, Clean Catch [[id number]]  (Normal) Collected: 08/14/22 1818    Lab Status: Final result Specimen: Urine, Clean Catch Updated: 08/14/22 2101     Eosinophil Smear 0 % EOS/100 Cells     Narrative:      No eosinophil seen    Anaerobic Culture - Tissue, Toe, Left [[id number]] Collected: 08/02/22 1027    Lab Status: Final result Specimen: Tissue from Toe, Left Updated: 08/07/22 0858     Anaerobic Culture No  anaerobes isolated at 5 days    Blood Culture - Blood, Hand, Left [[id number]]  (Normal) Collected: 07/27/22 2040    Lab Status: Final result Specimen: Blood from Hand, Left Updated: 08/01/22 2132     Blood Culture No growth at 5 days    Blood Culture - Blood, Hand, Right [[id number]]  (Normal) Collected: 07/27/22 2030    Lab Status: Final result Specimen: Blood from Hand, Right Updated: 08/01/22 2132     Blood Culture No growth at 5 days    COVID PRE-OP / PRE-PROCEDURE SCREENING ORDER (NO ISOLATION) - Swab, Nasopharynx [[id number]]  (Normal) Collected: 07/27/22 2246    Lab Status: Final result Specimen: Swab from Nasopharynx Updated: 07/27/22 2357    Narrative:      The following orders were created for panel order COVID PRE-OP / PRE-PROCEDURE SCREENING ORDER (NO ISOLATION) - Swab, Nasopharynx.  Procedure                               Abnormality         Status                     ---------                               -----------         ------                     COVID-19 and FLU A/B PCR...[[id number]]  Normal              Final result                 Please view results for these tests on the individual orders.    COVID-19 and FLU A/B PCR - Swab, Nasopharynx [[id number]]  (Normal) Collected: 07/27/22 2246    Lab Status: Final result Specimen: Swab from Nasopharynx Updated: 07/27/22 2357     COVID19 Not Detected     Influenza A PCR Not Detected     Influenza B PCR Not Detected    Narrative:      Fact sheet for providers: https://www.fda.gov/media/467108/download    Fact sheet for patients: https://www.fda.gov/media/503430/download    Test performed by PCR.          XR Chest 1 View    Result Date: 8/18/2022  PROCEDURE:   XR CHEST 1 VW HISTORY:   PICC placement COMPARISONS: 8/16/2022 FINDINGS: Poor visualization of the right arm PICC. Right arm PICC appears to show interval retraction now terminating within the right axillary region. Persistent multifocal airspace opacities and interstitial prominence. Similar blunting  of the left costophrenic sulcus. No pneumothorax. Stable cardiomegaly.     Impression: 1. Interval retraction of the right arm PICC now terminates within the right mid axillary region. 2. Persistent edema and/or infection. 3. Persistent small left pleural effusion. Electronically signed by:  John Morocho D.O.  8/18/2022 12:47 AM Mountain Time      Results for orders placed during the hospital encounter of 07/27/22    Adult Transthoracic Echo Complete W/ Cont if Necessary Per Protocol    Interpretation Summary  · Left ventricular ejection fraction appears to be 46 - 50%. Left ventricular systolic function is low normal.  · Left ventricular septal hypokinesis  · No significant valvular heart disease      I have reviewed the medications:  Scheduled Meds:amLODIPine, 10 mg, Oral, Daily  ampicillin-sulbactam, 3 g, Intravenous, Q12H  apixaban, 10 mg, Oral, Q12H   Followed by  [START ON 8/21/2022] apixaban, 5 mg, Oral, Q12H  aspirin, 81 mg, Oral, Daily  cetirizine, 5 mg, Oral, Daily  insulin detemir, 15 Units, Subcutaneous, Nightly  insulin lispro, 0-9 Units, Subcutaneous, TID AC  Insulin Lispro, 3 Units, Subcutaneous, TID With Meals  levothyroxine, 50 mcg, Oral, Q AM  metoprolol succinate XL, 50 mg, Oral, Q24H  polyethylene glycol, 17 g, Oral, Daily  senna-docusate sodium, 2 tablet, Oral, BID  sodium chloride, 10 mL, Intravenous, Q12H  sodium chloride, 10 mL, Intravenous, Q12H  vitamin D3, 5,000 Units, Oral, Daily      Continuous Infusions:   PRN Meds:.•  acetaminophen **OR** acetaminophen **OR** acetaminophen  •  bisacodyl  •  calcium carbonate  •  dextrose  •  dextrose  •  glucagon (human recombinant)  •  HYDROcodone-acetaminophen  •  ipratropium-albuterol  •  nitroglycerin  •  ondansetron **OR** ondansetron  •  phenol  •  prochlorperazine  •  sennosides-docusate  •  simethicone  •  [COMPLETED] Insert peripheral IV **AND** sodium chloride  •  sodium chloride  •  sodium chloride    Assessment & Plan   Assessment &  Plan     Active Hospital Problems    Diagnosis  POA   • **Sepsis ([facility]) [A41.9]  Yes   • Acute renal failure (ARF) ([facility]) [N17.9]  Yes   • Acute systolic heart failure ([facility]) [I50.21]  No   • DVT, bilateral lower limbs ([facility]) [I82.403]  No   • Acute respiratory failure with hypoxia ([facility]) [J96.01]  No   • Proximal phalanx fracture of the second digit extending into the second metatarsal joint [S92.919A]  Yes   • Dislocation of left shoulder joint, initial encounter [S43.005A]  Yes   • Cellulitis in diabetic foot ([facility]) [E11.628, L03.119]  Yes   • Fall [W19.XXXA]  Yes   • Lactic acidosis [E87.2]  Yes   • SSS (sick sinus syndrome) ([facility]) [I49.5]  Yes   • Hyperlipidemia [E78.5]  Yes   • Hypertension [I10]  Yes   • S/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 Au [Z95.1]  Not Applicable   • Hypothyroidism (acquired) [E03.9]  Yes   • Type 2 diabetes mellitus ([facility]) [E11.9]  Yes      Resolved Hospital Problems   No resolved problems to display.        Brief Hospital Course to date:  Jermaine Singh is a 77 y.o. male with PMH significant for HTN, HLD, CAD s/p CABG, PVD s/p RLE stent, CKD III, insulin-dependent DMII and hypothyroidism. He was admitted to [facility] 7/27/22 for L shoulder dislocation after a fall as well as sepsis secondary to L great toe osteomyelitis.     This patient's problems and plans were partially entered by my partner and updated as appropriate by me 08/18/22.  All problems are new to me today     L great toe cellulitis / osteomyelitis  Peripheral vascular disease   - Followed by Dr. Joseph of podiatry - was on PO Doxycycline outpatient  - Bone scan was concerning for osteomyelitis of L great toe, now s/p L great toe amputation by Dr. Márquez 8/2/22   - arterial duplex obtained. Cardiology did not recommend revascularization.  - tissue culture growing enterococcus faecalis and enterococcus casseliflavus  - ID following - changed to Unasyn, will need 6 weeks from 8/2/22 (9/13/2022)  - PICC originally  placed 8/10, patient pulled it moving in the bed last night and had to be replaced this AM     Acute Hypoxic Respiratory Failure  Acute Systolic CHF  Elevated troponin  -Previosuly viewed CXR from 8/16.  Looked to have increasing edema and developing left effusion compared to previous.  -Has some mild acute systolic heart failure with EF of 46 to 50%, though cardiology note says more like 40% based on relook.  -Status post 2 mg of IV Bumex on 8/13 with good response and was able to be weaned off of high flow to nasal cannula  - Got 40mg IV lasix 8/16.  Now down to 2.5L NC but Cr worse.   - Continue to hold diuresis in light of worsening Cr   - CXR this AM with persistent edema   -VQ scan was read as low probability of PE  -Minimal troponin elevation peaked at 0.230, cardiology following and feels it is related to demand ischemia     Acute renal failure  - Baseline creatinine appears to be 0.9-1.2  - Creatinine continues to trend up  - renal US normal, artery duplex normal  - appreciate renal assistance  - If worsens, may be at risk for dialysis    Bilateral LE DVT's  -Initially placed on heparin drip which has now been transitioned to Eliquis  -VQ scan low probability     Fall   Anterior L shoulder dislocation  - s/p reduction in the ED  - Appreciate ortho (Dr. Gipson) assistance   - Recommend non-operative treatment. Sling for comfort.   - PT for range of motion exercises, avoid Abduction and external rotation. WBAT on LUE with walker or knee scooter  - Follow up with Dr. Gipson in 2-3 weeks   - stable today    Constipation  Nausea/Vomiting  -Continue bowel regimen     Insulin-dependent DMII  - Hgb A1c 9.0%  - Glucose 63 on BMP this morning  - Decreased Levemir from 26 units to 10 units nightly  - DC scheduled Humalog  - Continue Mod Dose SSI     Hypertension  Coronary artery disease s/p CABG  SSS s/p PPM   - Cardiology has stopped ACEI and added Amlodipine 10mg daily   - Continue Metoprolol XL 50mg daily   -  scheduled for 12/28/2022 1:30 pm with device check and should keep that appt.     Hypothyroid  - Continue levothyroxine    Urinary Retention  -Add Bethanechol 10mg TID  -Add Flomax 0.4mg daily   -Ambulate/OOB     Expected Discharge Location and Transportation: [facility]  Expected Discharge Date: 8/22/22    DVT prophylaxis:  Medical and mechanical DVT prophylaxis orders are present.     AM-PAC 6 Clicks Score (PT): 9 (08/17/22 1042)     CODE STATUS:   Code Status and Medical Interventions:   Ordered at: 07/28/22 0000     Code Status (Patient has no pulse and is not breathing):    CPR (Attempt to Resuscitate)     Medical Interventions (Patient has pulse or is breathing):    Full Support       Luz Elena Batista,   08/18/22

## 2022-08-18 NOTE — NURSING NOTE
Patient accidentally pulled out part of PICC line just before 0100 this AM (aprox. 3-4 inches) while attempting to pull self up in the bed;  PICC line and insertion site cleaned with chlorhexidine and secured with new dressing; Provider notified; STAT chest x-ray ordered

## 2022-08-18 NOTE — NURSING NOTE
Prior to central line removal, order for the removal of catheter was verified, patient was assessed, necessary materials were gathered and patient was educated regarding procedure .    Patient was positioned supine to ensure that the insertion site was at or below the level of the heart.    Hands were washed, clean gloves were applied and central line dressing was gently removed. Catheter exit site was not cultured.     A new pair of clean gloves were then applied. Insertion site was cleansed with 2% Chlorhexidine swab using a circular motion beginning at the insertion site and moving outward for 30 seconds and allowed to dry.     Clamp on line was not present.     Patient was instructed to hold breath as catheter was withdrawn.     The central line was grasped at the insertion site and slowly pulled outward parallel to the skin. Resistance was not met.    After central line was completely removed, a sterile 4x4 gauze pad was used to apply light pressure until bleeding stopped. At that time, petroleum-based gauze and a sterile occlusive dressing was applied to exit site.     Patient was instructed to keep dressing in place for at least 24 hours and to remain in a flat or reclining position for 30 minutes post-removal.     Catheter was inspected after removal and Intact. Tip of central line was not sent for culture. Patient tolerated procedure.

## 2022-08-18 NOTE — PROGRESS NOTES
"   LOS: 22 days    Patient Care Team:  Farooq Painter MD as PCP - General (Family Medicine)  Jermaine Hernandez MD as Consulting Physician (Cardiology)    Chief Complaint:   77-year-old male  left foot to infection has been treated with antibiotic.  Initial creatinine has been within acceptable range, patient received IV vancomycin on starting 7/27/2022, was continued until 8/4/2022 at that time the creatinine started to go up to 2.02 patient's antibiotic was changed, taken off the vancomycin at that time creatinine started to get improved 1.8 slowly over the last 3 days creatinine has been slowly creeping up.  Patient has a past medical history of CAD, CABG, hyperlipidemia, diabetes type 2, hypertension, hypothyroidism, peripheral vascular disease, sick sinus syndrome with pacemaker placement. creatinine 2.07 BUN of 51, albumin 2.9, white count 14.2, platelets 209 normal. Patient is on ampicillin IV, and IV Solu-Medrol 60 mg.  Subjective     Interval History:   Worsening of renal function. Audible wheezing. req straight cath last night with 500cc uop.     Review of Systems:   Complain generalized weakness other than that all other systems are negative.    Objective     Vital Sign Min/Max for last 24 hours  Temp  Min: 96.9 °F (36.1 °C)  Max: 97.8 °F (36.6 °C)   BP  Min: 108/50  Max: 160/94   Pulse  Min: 61  Max: 66   Resp  Min: 16  Max: 22   SpO2  Min: 92 %  Max: 95 %   Flow (L/min)  Min: 2.5  Max: 2.53   Weight  Min: 101 kg (223 lb 3.2 oz)  Max: 101 kg (223 lb 3.2 oz)     Flowsheet Rows    Flowsheet Row First Filed Value   Admission Height 190.5 cm (75\") Documented at 07/27/2022 1848   Admission Weight 98 kg (216 lb) Documented at 07/27/2022 1848          No intake/output data recorded.  I/O last 3 completed shifts:  In: 400 [P.O.:400]  Out: 750 [Urine:750]    Physical Exam:  General Appearance: Awake alert oriented  male sitting comfortably in chair.  Eyes: PER, EOMI.  Neck: Supple no JVD.  Lungs: " Clear auscultation, no rales rhonchi's, equal chest movement, nonlabored.  Heart: No gallop, murmur, rub, RRR.  Abdomen: Soft, nontender, positive bowel sounds, no organomegaly.  Extremities: No edema, no cyanosis.  Left toe in bandage  Neuro: No focal deficit, moving all extremities, alert oriented X 3   no suprapubic fullness or tenderness  WBC No results found for: WBC   HGB No results found for: HGB   HCT No results found for: HCT   Platlets No results found for: LABPLAT   MCV No results found for: MCV       Sodium Sodium   Date Value Ref Range Status   08/18/2022 135 (L) 136 - 145 mmol/L Final   08/17/2022 137 136 - 145 mmol/L Final      Potassium Potassium   Date Value Ref Range Status   08/18/2022 4.1 3.5 - 5.2 mmol/L Final     Comment:     Slight hemolysis detected by analyzer. Results may be affected.   08/17/2022 4.6 3.5 - 5.2 mmol/L Final      Chloride Chloride   Date Value Ref Range Status   08/18/2022 96 (L) 98 - 107 mmol/L Final   08/17/2022 96 (L) 98 - 107 mmol/L Final      CO2 CO2   Date Value Ref Range Status   08/18/2022 26.0 22.0 - 29.0 mmol/L Final   08/17/2022 28.0 22.0 - 29.0 mmol/L Final      BUN BUN   Date Value Ref Range Status   08/18/2022 82 (H) 8 - 23 mg/dL Final   08/17/2022 78 (H) 8 - 23 mg/dL Final      Creatinine Creatinine   Date Value Ref Range Status   08/18/2022 3.17 (H) 0.76 - 1.27 mg/dL Final   08/17/2022 2.66 (H) 0.76 - 1.27 mg/dL Final      Calcium Calcium   Date Value Ref Range Status   08/18/2022 8.3 (L) 8.6 - 10.5 mg/dL Final   08/17/2022 8.2 (L) 8.6 - 10.5 mg/dL Final      PO4 No results found for: CAPO4   Albumin No results found for: ALBUMIN   Magnesium No results found for: MG   Uric Acid No results found for: URICACID        Results Review:     I reviewed the patient's new clinical results.    amLODIPine, 10 mg, Oral, Daily  ampicillin-sulbactam, 3 g, Intravenous, Q12H  apixaban, 10 mg, Oral, Q12H   Followed by  [START ON 8/21/2022] apixaban, 5 mg, Oral,  Q12H  aspirin, 81 mg, Oral, Daily  bethanechol, 10 mg, Oral, TID  cetirizine, 5 mg, Oral, Daily  insulin detemir, 10 Units, Subcutaneous, Nightly  insulin lispro, 0-9 Units, Subcutaneous, TID AC  levothyroxine, 50 mcg, Oral, Q AM  metoprolol succinate XL, 50 mg, Oral, Q24H  polyethylene glycol, 17 g, Oral, Daily  senna-docusate sodium, 2 tablet, Oral, BID  sodium chloride, 10 mL, Intravenous, Q12H  sodium chloride, 10 mL, Intravenous, Q12H  tamsulosin, 0.4 mg, Oral, Daily  vitamin D3, 5,000 Units, Oral, Daily           Medication Review: Reviewed    Assessment & Plan       Sepsis ([facility])    Type 2 diabetes mellitus ([facility])    Hypertension    Hyperlipidemia    Hypothyroidism (acquired)    S/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 Au    SSS (sick sinus syndrome) ([facility])    Dislocation of left shoulder joint, initial encounter    Cellulitis in diabetic foot ([facility])    Fall    Lactic acidosis    Proximal phalanx fracture of the second digit extending into the second metatarsal joint    Acute renal failure (ARF) ([facility])    Acute systolic heart failure ([facility])    DVT, bilateral lower limbs ([facility])    Acute respiratory failure with hypoxia ([facility])        1.  Acute kidney injury: Baseline creatinine 1.02-1.05.  Patient was admitted and was started on antibiotic for osteomyelitis.  He received vancomycin which was started on 7/27/2022 until 8/4/2022 due to acute rise in creatinine to 2.07.  Renal artery duplex negative for renal artery stenosis.  Ultrasound kidney showed normal size kidney right 11.6 cm, left 11.1 cm.  Normal bladder.  CK1 71, C4 complement 34, C3 complement 137, TRISH negative, urine eos negative, urine protein 57, urine creatinine 150,Normal ultrasound kidney without  any obstruction.  No renal artery stenosis.  2.  Osteomyelitis left toe.  3.  Type 2 diabetes.  4.  History of hypertension  5.  History of hyperlipidemia.  6.  CAD s/p CABG 3/22/2019 by Dr. Au's.  7.  Sick sinus syndrome.  8.  Proteinuria: Protein  creatinine ratio 0.38 g      Recommendations plan.  Worsening renal function .Etiology of [place] thought to be ATN ( vancomycin related). Given urinary retention and worsening renal function will place medel cath. High risk for needing dialysis. Recommend aoviding PICC line if possible. Will add bumex 2 mg IV for optimization of volume status. Daily labs. Strict I/O.      Blake Tilley MD  08/18/22  14:17 EDT

## 2022-08-18 NOTE — PROGRESS NOTES
Cardiothoracic Surgery Progress Note      POD #: 16-left great toe transmetatarsal amputation primary closure     LOS: 22 days      Subjective: Groggy this morning did not awaken during the dressing changes    Objective:  Vital Signs vital signs below noted T-max past 24 hours 97.8 °F  Temp:  [96.7 °F (35.9 °C)-97.8 °F (36.6 °C)] 97.3 °F (36.3 °C)  Heart Rate:  [61-69] 61  Resp:  [17-22] 18  BP: (122-160)/(44-94) 134/84    Physical Exam:   General Appearance: Groggy   Lungs:   Heart:   Skin: Ulceration middle phalangeal joint left second toe with exposed phalangeal bone/joint space   Incision: Amputation site dressing change.  Overall's skin margins are viable with some ischemia in the most proximal portion.  Skin flaps are viable.     Results:  Results from last 7 days   Lab Units 08/15/22  0615   WBC 10*3/mm3 9.63   HEMOGLOBIN g/dL 10.6*   HEMATOCRIT % 32.6*   PLATELETS 10*3/mm3 199     Results from last 7 days   Lab Units 08/18/22  0327   SODIUM mmol/L 135*   POTASSIUM mmol/L 4.1   CHLORIDE mmol/L 96*   CO2 mmol/L 26.0   BUN mg/dL 82*   CREATININE mg/dL 3.17*   GLUCOSE mg/dL 63*   CALCIUM mg/dL 8.3*         Assessment: #1.  Postop day 16 left great toe transmetatarsal amputation primary closure overall healing well with some ischemia at the proximal third  To expose middle phalangeal joint space with exposed bone secondary to hammertoe phenomena and chronic diabetic ulceration  3 status post remote coronary bypass grafting  4 status post remote pacemaker placement for sick sinus syndrome  5.  Recent fall with rib shoulder dislocation reduced in the emergency department on this admission  6.  Endovascular angioplasty stenting right lower extremity per Dr. Hernandez 2019  7.  Flash pulm edema secondary to ischemic cardiac coronary disease      Plan: Continue daily dressing changes amputation site as well as over the ulceration the middle phalangeal joint space of the left second toe.  Antibiotics infectious disease.   Medical manage per hospitalist/cardiologist.  Renal medicine following closely for increasing creatinine and BUN      Gopal Márquez MD - 08/18/22 - 06:00 EDT

## 2022-08-18 NOTE — PROGRESS NOTES
"  [facility] at [facility]  PROGRESS NOTE    Date of Admission: 7/27/2022  Date of Service: 08/18/22    Primary Care Physician: Farooq Painter MD    Chief Complaint: Following for HTN, Acute CHF    Subjective    Awake in bed, getting breathing treatment which he reports helps.  Wife at bedside.  Bowels continue to move - no abdominal pain or distension.  No chest pain.  Breathing is stable.  Has been getting up to chair with PT - reports his bottom hurts.  Central line removal today.      Objective   Vitals: /50 (BP Location: Right leg, Patient Position: Lying)   Pulse 63   Temp 97.8 °F (36.6 °C) (Oral)   Resp 18   Ht 190.5 cm (75\")   Wt 101 kg (223 lb 3.2 oz)   SpO2 93%   BMI 27.90 kg/m²       Physical Exam:  GENERAL: Alert, cooperative, in no acute distress.   HEENT: Normocephalic, no jugular venous distention  HEART: Regular rhythm, normal rate, and no murmurs, gallops, or rubs.   LUNGS: Audible wheezing throughout all lung fields with coarse lung sounds, On 2-3L NC.   ABDOMEN: Soft, bowel sounds present, nontender   NEUROLOGIC: No focal abnormalities involving strength or sensation are noted.   EXTREMITIES: No clubbing, cyanosis.  Trace edema noted.     Results:  Results from last 7 days   Lab Units 08/15/22  0615 08/13/22  2310 08/13/22  1224   WBC 10*3/mm3 9.63 14.07* 14.42*   HEMOGLOBIN g/dL 10.6* 10.2* 10.5*   HEMATOCRIT % 32.6* 31.2* 31.4*   PLATELETS 10*3/mm3 199 226 209     Results from last 7 days   Lab Units 08/18/22  0327 08/17/22  0507 08/15/22  0615   SODIUM mmol/L 135* 137 138   POTASSIUM mmol/L 4.1 4.6 4.9   CHLORIDE mmol/L 96* 96* 97*   CO2 mmol/L 26.0 28.0 27.0   BUN mg/dL 82* 78* 64*   CREATININE mg/dL 3.17* 2.66* 2.16*   GLUCOSE mg/dL 63* 232* 225*      Lab Results   Component Value Date    CHOL 180 08/04/2022    TRIG 160 (H) 08/04/2022    HDL 39 (L) 08/04/2022     (H) 08/04/2022    AST 12 08/13/2022    ALT 13 08/13/2022         Results from last 7 " days   Lab Units 08/13/22  1224   PROTIME Seconds 13.8   INR  1.07   APTT seconds 29.7*     Results from last 7 days   Lab Units 08/15/22  0615 08/13/22  2310 08/13/22  1224 08/13/22  0730   CK TOTAL U/L 171  --   --   --    TROPONIN T ng/mL  --  0.230* 0.229* 0.230*     Results from last 7 days   Lab Units 08/13/22  2310   PROBNP pg/mL 31,919.0*       Intake/Output Summary (Last 24 hours) at 8/18/2022 0836  Last data filed at 8/17/2022 2200  Gross per 24 hour   Intake 400 ml   Output 450 ml   Net -50 ml       I personally reviewed the patient's EKG/Telemetry data    Radiology Data:   CXR 8/14/22:  IMPRESSION:     Increased patchy bibasilar opacities     Cardiomegaly and pulmonary vascular congestion.    Current Medications:  amLODIPine, 10 mg, Oral, Daily  ampicillin-sulbactam, 3 g, Intravenous, Q12H  apixaban, 10 mg, Oral, Q12H   Followed by  [START ON 8/21/2022] apixaban, 5 mg, Oral, Q12H  aspirin, 81 mg, Oral, Daily  cetirizine, 5 mg, Oral, Daily  insulin detemir, 15 Units, Subcutaneous, Nightly  insulin lispro, 0-9 Units, Subcutaneous, TID AC  Insulin Lispro, 3 Units, Subcutaneous, TID With Meals  levothyroxine, 50 mcg, Oral, Q AM  metoprolol succinate XL, 50 mg, Oral, Q24H  polyethylene glycol, 17 g, Oral, Daily  senna-docusate sodium, 2 tablet, Oral, BID  sodium chloride, 10 mL, Intravenous, Q12H  sodium chloride, 10 mL, Intravenous, Q12H  vitamin D3, 5,000 Units, Oral, Daily               ASSESSMENT:  PVDz with remote Soy iliac stenting, good collateral flow.  Osteomyelitis left Great toe  CAD  HTN  [place]  Acute CHF  Hypoxia  DMII  BLE DVTs      PLAN:  -  POD 16 p Left grt toe transmetatarsal amputation.  Continue PT/OT, antibiotics per ID.  Now on Ampicillin-sulbactam.  -  Mildly elevated troponin from demand ischemia with acute CHF. No acute EKG changes.  Continue Beta Blocker, CCB.  Repeat echo, EF likely closer to 40%.  -  Continued rise in Cr. - 3.17 today.  Patient needs diuresis, maybe even CRRT.  Nephrology following and should manage diuresis.  Renal ultrasound shows no obstruction and NO WILMA by ultrasound.  - Eliquis per DVT protocol.      Megan Shukla PA-C working with Dr. Cooper Apodaca  08/18/2022  08:38 AM EDT

## 2022-08-18 NOTE — PLAN OF CARE
Goal Outcome Evaluation:   VSS. Fonseca catheter placed. Oxygen 2.5L nc humidified. Holding off on the PICC for now, currently has a new patent peripheral IV. Will continue to monitor.

## 2022-08-19 LAB
ANION GAP SERPL CALCULATED.3IONS-SCNC: 16 MMOL/L (ref 5–15)
BUN SERPL-MCNC: 85 MG/DL (ref 8–23)
BUN/CREAT SERPL: 26.3 (ref 7–25)
CALCIUM SPEC-SCNC: 8.1 MG/DL (ref 8.6–10.5)
CHLORIDE SERPL-SCNC: 93 MMOL/L (ref 98–107)
CO2 SERPL-SCNC: 24 MMOL/L (ref 22–29)
CREAT SERPL-MCNC: 3.23 MG/DL (ref 0.76–1.27)
DEPRECATED RDW RBC AUTO: 40.6 FL (ref 37–54)
EGFRCR SERPLBLD CKD-EPI 2021: 19 ML/MIN/1.73
ERYTHROCYTE [DISTWIDTH] IN BLOOD BY AUTOMATED COUNT: 12.7 % (ref 12.3–15.4)
GLUCOSE BLDC GLUCOMTR-MCNC: 108 MG/DL (ref 70–130)
GLUCOSE BLDC GLUCOMTR-MCNC: 124 MG/DL (ref 70–130)
GLUCOSE BLDC GLUCOMTR-MCNC: 317 MG/DL (ref 70–130)
GLUCOSE BLDC GLUCOMTR-MCNC: 345 MG/DL (ref 70–130)
GLUCOSE BLDC GLUCOMTR-MCNC: 355 MG/DL (ref 70–130)
GLUCOSE SERPL-MCNC: 225 MG/DL (ref 65–99)
HCT VFR BLD AUTO: 31.9 % (ref 37.5–51)
HGB BLD-MCNC: 10.7 G/DL (ref 13–17.7)
MCH RBC QN AUTO: 29.2 PG (ref 26.6–33)
MCHC RBC AUTO-ENTMCNC: 33.5 G/DL (ref 31.5–35.7)
MCV RBC AUTO: 87.2 FL (ref 79–97)
PLATELET # BLD AUTO: 160 10*3/MM3 (ref 140–450)
PMV BLD AUTO: 13.5 FL (ref 6–12)
POTASSIUM SERPL-SCNC: 4.4 MMOL/L (ref 3.5–5.2)
RBC # BLD AUTO: 3.66 10*6/MM3 (ref 4.14–5.8)
SODIUM SERPL-SCNC: 133 MMOL/L (ref 136–145)
WBC NRBC COR # BLD: 11.2 10*3/MM3 (ref 3.4–10.8)

## 2022-08-19 PROCEDURE — 63710000001 INSULIN LISPRO (HUMAN) PER 5 UNITS: Performed by: INTERNAL MEDICINE

## 2022-08-19 PROCEDURE — 94799 UNLISTED PULMONARY SVC/PX: CPT

## 2022-08-19 PROCEDURE — 97110 THERAPEUTIC EXERCISES: CPT

## 2022-08-19 PROCEDURE — 80048 BASIC METABOLIC PNL TOTAL CA: CPT | Performed by: FAMILY MEDICINE

## 2022-08-19 PROCEDURE — 85027 COMPLETE CBC AUTOMATED: CPT | Performed by: FAMILY MEDICINE

## 2022-08-19 PROCEDURE — 63710000001 INSULIN LISPRO (HUMAN) PER 5 UNITS: Performed by: FAMILY MEDICINE

## 2022-08-19 PROCEDURE — 82962 GLUCOSE BLOOD TEST: CPT

## 2022-08-19 PROCEDURE — 99232 SBSQ HOSP IP/OBS MODERATE 35: CPT | Performed by: FAMILY MEDICINE

## 2022-08-19 PROCEDURE — 99024 POSTOP FOLLOW-UP VISIT: CPT

## 2022-08-19 PROCEDURE — 94664 DEMO&/EVAL PT USE INHALER: CPT

## 2022-08-19 PROCEDURE — 97530 THERAPEUTIC ACTIVITIES: CPT

## 2022-08-19 RX ORDER — AMOXICILLIN AND CLAVULANATE POTASSIUM 500; 125 MG/1; MG/1
1 TABLET, FILM COATED ORAL EVERY 12 HOURS SCHEDULED
Status: DISCONTINUED | OUTPATIENT
Start: 2022-08-19 | End: 2022-08-26 | Stop reason: HOSPADM

## 2022-08-19 RX ORDER — BUMETANIDE 0.25 MG/ML
2 INJECTION INTRAMUSCULAR; INTRAVENOUS DAILY
Status: DISCONTINUED | OUTPATIENT
Start: 2022-08-19 | End: 2022-08-25

## 2022-08-19 RX ORDER — INSULIN LISPRO 100 [IU]/ML
5 INJECTION, SOLUTION INTRAVENOUS; SUBCUTANEOUS
Status: DISCONTINUED | OUTPATIENT
Start: 2022-08-19 | End: 2022-08-26 | Stop reason: HOSPADM

## 2022-08-19 RX ADMIN — Medication 10 ML: at 21:01

## 2022-08-19 RX ADMIN — INSULIN LISPRO 5 UNITS: 100 INJECTION, SOLUTION INTRAVENOUS; SUBCUTANEOUS at 13:47

## 2022-08-19 RX ADMIN — BETHANECHOL CHLORIDE 10 MG: 10 TABLET ORAL at 09:10

## 2022-08-19 RX ADMIN — TAMSULOSIN HYDROCHLORIDE 0.4 MG: 0.4 CAPSULE ORAL at 09:09

## 2022-08-19 RX ADMIN — ASPIRIN 81 MG CHEWABLE TABLET 81 MG: 81 TABLET CHEWABLE at 09:09

## 2022-08-19 RX ADMIN — AMLODIPINE BESYLATE 10 MG: 10 TABLET ORAL at 09:23

## 2022-08-19 RX ADMIN — INSULIN LISPRO 7 UNITS: 100 INJECTION, SOLUTION INTRAVENOUS; SUBCUTANEOUS at 12:09

## 2022-08-19 RX ADMIN — BUMETANIDE 2 MG: 0.25 INJECTION INTRAMUSCULAR; INTRAVENOUS at 13:47

## 2022-08-19 RX ADMIN — ACETAMINOPHEN 650 MG: 325 TABLET, FILM COATED ORAL at 21:52

## 2022-08-19 RX ADMIN — POLYETHYLENE GLYCOL 3350 17 G: 17 POWDER, FOR SOLUTION ORAL at 09:10

## 2022-08-19 RX ADMIN — SENNOSIDES AND DOCUSATE SODIUM 2 TABLET: 50; 8.6 TABLET ORAL at 21:00

## 2022-08-19 RX ADMIN — APIXABAN 10 MG: 5 TABLET, FILM COATED ORAL at 09:10

## 2022-08-19 RX ADMIN — AMOXICILLIN AND CLAVULANATE POTASSIUM 500 MG: 500; 125 TABLET, FILM COATED ORAL at 16:46

## 2022-08-19 RX ADMIN — METOPROLOL SUCCINATE 50 MG: 50 TABLET, EXTENDED RELEASE ORAL at 09:10

## 2022-08-19 RX ADMIN — CETIRIZINE HYDROCHLORIDE 5 MG: 10 TABLET, FILM COATED ORAL at 09:10

## 2022-08-19 RX ADMIN — APIXABAN 10 MG: 5 TABLET, FILM COATED ORAL at 21:00

## 2022-08-19 RX ADMIN — Medication 10 ML: at 09:20

## 2022-08-19 RX ADMIN — BETHANECHOL CHLORIDE 10 MG: 10 TABLET ORAL at 21:00

## 2022-08-19 RX ADMIN — BETHANECHOL CHLORIDE 10 MG: 10 TABLET ORAL at 16:46

## 2022-08-19 RX ADMIN — SENNOSIDES AND DOCUSATE SODIUM 2 TABLET: 50; 8.6 TABLET ORAL at 09:09

## 2022-08-19 RX ADMIN — IPRATROPIUM BROMIDE AND ALBUTEROL SULFATE 3 ML: .5; 3 SOLUTION RESPIRATORY (INHALATION) at 12:35

## 2022-08-19 RX ADMIN — Medication 5000 UNITS: at 09:10

## 2022-08-19 RX ADMIN — LEVOTHYROXINE SODIUM 50 MCG: 50 TABLET ORAL at 05:12

## 2022-08-19 RX ADMIN — INSULIN LISPRO 7 UNITS: 100 INJECTION, SOLUTION INTRAVENOUS; SUBCUTANEOUS at 09:10

## 2022-08-19 NOTE — PROGRESS NOTES
Cardiothoracic Surgery Progress Note      POD #: 17-left great toe transmetatarsal amputation primary closure     LOS: 23 days      Subjective: No complaints. Awake and alert. Asking to get up to chair.    Objective:  Vital Signs vital signs below noted T-max past 24 hours 97.8 °F  Temp:  [96.6 °F (35.9 °C)-98.2 °F (36.8 °C)] 98.2 °F (36.8 °C)  Heart Rate:  [60-66] 62  Resp:  [16-20] 18  BP: (113-129)/(67-82) 120/82    Physical Exam:   General Appearance: Awake and alert.   Lungs:   Heart:   Skin: Ulceration middle phalangeal joint left second toe with exposed phalangeal bone/joint space   Incision: Amputation site dressing change.  Overall's skin margins are viable with some ischemia in the most proximal portion.  Skin flaps are viable.     Results:  Results from last 7 days   Lab Units 08/19/22  0333   WBC 10*3/mm3 11.20*   HEMOGLOBIN g/dL 10.7*   HEMATOCRIT % 31.9*   PLATELETS 10*3/mm3 160     Results from last 7 days   Lab Units 08/19/22  0333   SODIUM mmol/L 133*   POTASSIUM mmol/L 4.4   CHLORIDE mmol/L 93*   CO2 mmol/L 24.0   BUN mg/dL 85*   CREATININE mg/dL 3.23*   GLUCOSE mg/dL 225*   CALCIUM mg/dL 8.1*         Assessment: #1.  Postop day 17 left great toe transmetatarsal amputation primary closure overall healing well with some ischemia at the proximal third  To expose middle phalangeal joint space with exposed bone secondary to hammertoe phenomena and chronic diabetic ulceration  3 status post remote coronary bypass grafting  4 status post remote pacemaker placement for sick sinus syndrome  5.  Recent fall with rib shoulder dislocation reduced in the emergency department on this admission  6.  Endovascular angioplasty stenting right lower extremity per Dr. Hernandez 2019  7.  Flash pulm edema secondary to ischemic cardiac coronary disease      Plan: Continue daily dressing changes amputation site as well as over the ulceration the middle phalangeal joint space of the left second toe.  Antibiotics infectious  disease.  Medical manage per hospitalist/cardiologist.  Renal medicine following closely for increasing creatinine and BUN.       Chloe Man, DAVID - 08/19/22 - 07:20 EDT

## 2022-08-19 NOTE — PLAN OF CARE
Goal Outcome Evaluation:  Plan of Care Reviewed With: patient, spouse   Pt aox4, vss, 3L nc, dressings CDI, no c/o pain, pt has rested well this shift with no complaints, medel cath remians in place, 650 ml output this shift, plan to dc to IPR when medically ready.

## 2022-08-19 NOTE — PLAN OF CARE
Goal Outcome Evaluation:  Plan of Care Reviewed With: patient        Progress: no change  Outcome Evaluation: patient transfered bed>recliner via lift for safety, attempted 2 sit<>stands from recliner with B UE support on bedrail, despite max verbal cues and education on NWB status patient unable to maintain NWB L LE, patient was able to reach full upright posture but unable to maintain NWB, patient easily agitated with education regarding NWB status. Completed B LE exercises.

## 2022-08-19 NOTE — CONSULTS
Spoke with Dr Braga on 08/18, will try oral antibiotics at present time due to elevated renal function.  Holding off on PICC

## 2022-08-19 NOTE — PROGRESS NOTES
Jermaine JIMENES Francisco  1945  [id number]    Date of Consult: 7/28/22    Admission Date: 7/27/2022      Requesting Provider: No ref. provider found  Evaluating Physician: Sage Braga MD    Reason for Consultation: Evaluation of toe wound    History of present illness:    Patient is a 77 y.o. male with coronary disease history of CABG diabetes mellitus type 2 hypothyroidism hypertension hyperlipidemia prevascular disease presents the emergency room after having a fall and injuring left shoulder patient tripped while walking his dog.  Coincidentally patient wears an orthotic shoe as recommended by a podiatrist has been on oral doxycycline for a left toe wound we are asked to evaluate this patient to start on broad-spectrum antibiotics occluding vancomycin and Zosyn.  X-ray of left foot showed soft tissue swelling in the forefoot without obvious fracture or osteomyelitis.    Has history of pacemaker    History of vascular stent placed in right leg.    7/29/2022; is having bone scan today denies fevers rash sore throat or diarrhea    7/30/22:  Bone scan compatible with osteomyelitis distal phalanx of first digit, acute fracture 2nd digit.  Afebrile. Blood cultures no growth to date.     7/31/22:  Afebrile.   Dr. Márquez consulting Dr. Apodaca for vascular studies left lower extremity.   No n/v/d.  Complains of shoulder pain.     8/2/22; doing well; had surgery today; toe amputation, no fever, rash, sore throat    8/3/22; no events overnight; resting quietly no events overnight    8/4/22; doing well; no events overnight; no fever, rash, sore throat;   8/5/22; no events overnight; no fever, rash, sore throat, no diarrhea    8/8/22; no events overnight; no fever, rash, sore throat    8/9/22; no events overnight; no fever, rash, sore throat no diarrhea  8/11/22; patient on hfnc, followed by cardiology being worked up for elevated troponin, pobnp.  Resting quietly    8/12/22; on hfnc, getting diuresed, no fever, rahs, sore  throat has nausea, reports chest pain    8/14/22: on 4L O2.  Denies f/c, sore throat, n/v/d, rashes.   8/15/22: on 4.5 L O2.  Still with right upper chest wall pain.  Denies f/c, sob, n/v/d, rashes. Just back from [facility].      8/17/2022 patient's is on 2.5 L of oxygen has no complaints denies fevers rash sore throat family is by bedside    8/19/22 on oxygen by nasal cannula resting comfortably in bed no complaints denies fevers or sore throat    Past Medical History:   Diagnosis Date   • Asthma    • Coronary artery disease    • Diabetes mellitus (HCC) 2000    started on inuslin 12/2018; started on po meds in 2000; checking blood sugars daily    • Disease of thyroid gland     po meds daily for hypothyroidism    • History of fracture as a child     rt leg- severe    • Hyperlipidemia    • Hypertension    • Hypothyroidism    • Peripheral neuropathy    • Peripheral vascular disease (HCC)     s/p angiogram 2/19-needs stent in left leg    • RBBB    • Right knee pain    • Vitamin D deficiency        Past Surgical History:   Procedure Laterality Date   • APPENDECTOMY     • CARDIAC CATHETERIZATION N/A 2/14/2019    Procedure: Left Heart Cath;  Surgeon: Cooper Apodaca MD;  Location:  [facility];  Service: Cardiology   • CARDIAC ELECTROPHYSIOLOGY PROCEDURE N/A 5/18/2022    Procedure: DEVICE IMPLANT;  Surgeon: Cooper Apodaca MD;  Location:  [facility];  Service: Cardiology;  Laterality: N/A;   • COLONOSCOPY     • CORONARY ARTERY BYPASS GRAFT N/A 3/22/2019    Procedure: MEDIAN STERNOTOMY, CORONARY ARTERY BYPASS GRAFT X3, UTILIZING THE LEFT INTERNAL MAMMARY ARTERY, EVH AND OPEN HARVEST OF THE RIGHT GREATER SAPHENOUS VEIN, EXPLORATION OF THE LEFT LEG;  Surgeon: Ap Au MD;  Location[facility] OR;  Service: Cardiothoracic   • EYE SURGERY Bilateral     cataracts    • INTERVENTIONAL RADIOLOGY PROCEDURE N/A 7/29/2021    Procedure: Abdominal Aortagram with Runoff;   Surgeon: Jermaine Hernandez MD;  Location:  [facility];  Service: Cardiovascular;  Laterality: N/A;   • KNEE ARTHROSCOPY      right x 2, left x 1   • LACERATION REPAIR      right leg   • LEG SURGERY      2 for fracture of rt leg    • TONSILLECTOMY      Adnoidectomy   • TRANS METATARSAL AMPUTATION Left 8/2/2022    Procedure: GREAT TOE AMPUTATION LEFT;  Surgeon: Gopal Márquez MD;  Location:  [facility] OR;  Service: Vascular;  Laterality: Left;       Family History   Problem Relation Age of Onset   • Coronary artery disease Mother    • Diabetes Mother    • Cancer Father        Social History     Socioeconomic History   • Marital status:    • Number of children: 2   Tobacco Use   • Smoking status: Never Smoker   • Smokeless tobacco: Never Used   Substance and Sexual Activity   • Alcohol use: No   • Drug use: No   • Sexual activity: Defer       No Known Allergies      Medication:    Current Facility-Administered Medications:   •  acetaminophen (TYLENOL) tablet 650 mg, 650 mg, Oral, Q4H PRN, 650 mg at 08/17/22 0233 **OR** acetaminophen (TYLENOL) 160 MG/5ML solution 650 mg, 650 mg, Oral, Q4H PRN **OR** acetaminophen (TYLENOL) suppository 650 mg, 650 mg, Rectal, Q4H PRN, Nishant Allan PA  •  amLODIPine (NORVASC) tablet 10 mg, 10 mg, Oral, Daily, Nishant Allan PA, 10 mg at 08/19/22 0923  •  amoxicillin-clavulanate (AUGMENTIN) 500-125 MG per tablet 500 mg, 1 tablet, Oral, Q12H, Clifford Tavares, [facility]  •  apixaban (ELIQUIS) tablet 10 mg, 10 mg, Oral, Q12H, 10 mg at 08/19/22 0910 **FOLLOWED BY** [START ON 8/21/2022] apixaban (ELIQUIS) tablet 5 mg, 5 mg, Oral, Q12H, Reilly Thomas MD  •  aspirin chewable tablet 81 mg, 81 mg, Oral, Daily, Nishant Allan PA, 81 mg at 08/19/22 0909  •  benzocaine-menthol (CEPACOL) lozenge 1 lozenge, 1 lozenge, Mouth/Throat, Q2H PRN, Luz Elena Batista DO, 1 lozenge at 08/18/22 1811  •  bethanechol (URECHOLINE) tablet 10 mg, 10 mg, Oral, TID, Luz Elena Batista DO, 10  mg at 08/19/22 0910  •  bisacodyl (DULCOLAX) suppository 10 mg, 10 mg, Rectal, Daily PRN, Nishant Allan PA, 10 mg at 08/12/22 2041  •  bumetanide (BUMEX) injection 2 mg, 2 mg, Intravenous, Daily, Blake Tilley MD  •  calcium carbonate (TUMS) chewable tablet 500 mg (200 mg elemental), 2 tablet, Oral, TID PRN, Hayder Vogel MD, 2 tablet at 08/17/22 1642  •  cetirizine (zyrTEC) tablet 5 mg, 5 mg, Oral, Daily, Nishant Allan PA, 5 mg at 08/19/22 0910  •  dextrose (D50W) (25 g/50 mL) IV injection 25 g, 25 g, Intravenous, Q15 Min PRN, Nishant Allan PA  •  dextrose (GLUTOSE) oral gel 15 g, 15 g, Oral, Q15 Min PRN, Nishant Allan PA, 15 g at 08/18/22 0526  •  glucagon (human recombinant) (GLUCAGEN DIAGNOSTIC) injection 1 mg, 1 mg, Intramuscular, Q15 Min PRN, Nishant Allan PA  •  HYDROcodone-acetaminophen (NORCO) 5-325 MG per tablet 1 tablet, 1 tablet, Oral, Q4H PRN, Peter Braga, , 1 tablet at 08/15/22 0733  •  insulin detemir (LEVEMIR) injection 15 Units, 15 Units, Subcutaneous, Nightly, Luz Elena Batista, DO  •  Insulin Lispro (humaLOG) injection 0-9 Units, 0-9 Units, Subcutaneous, TID AC, Hayder Vogel MD, 7 Units at 08/19/22 1209  •  Insulin Lispro (humaLOG) injection 5 Units, 5 Units, Subcutaneous, TID With Meals, Luz Elena Batista DO  •  ipratropium-albuterol (DUO-NEB) nebulizer solution 3 mL, 3 mL, Nebulization, Q4H PRN, Eliana Hightower, APRN, 3 mL at 08/19/22 1235  •  levothyroxine (SYNTHROID, LEVOTHROID) tablet 50 mcg, 50 mcg, Oral, Q AM, Nishant Allan PA, 50 mcg at 08/19/22 0512  •  metoprolol succinate XL (TOPROL-XL) 24 hr tablet 50 mg, 50 mg, Oral, Q24H, Nishant Allan PA, 50 mg at 08/19/22 0910  •  nitroglycerin (NITROSTAT) SL tablet 0.4 mg, 0.4 mg, Sublingual, Q5 Min PRN, Reilly Thomas MD, 0.4 mg at 08/13/22 2206  •  ondansetron (ZOFRAN) tablet 4 mg, 4 mg, Oral, Q6H PRN **OR** ondansetron (ZOFRAN) injection 4 mg, 4 mg, Intravenous, Q6H PRN, Nishant Allan PA, 4 mg at  08/10/22 1607  •  phenol (CHLORASEPTIC) 1.4 % liquid 1 spray, 1 spray, Mouth/Throat, Q2H PRN, Megan Shukla PA, 1 spray at 08/04/22 1610  •  polyethylene glycol (MIRALAX) packet 17 g, 17 g, Oral, Daily, Rajani Farr, DO, 17 g at 08/19/22 0910  •  prochlorperazine (COMPAZINE) injection 5 mg, 5 mg, Intravenous, Q6H PRN, Rajani Farr, DO, 5 mg at 08/13/22 1955  •  sennosides-docusate (PERICOLACE) 8.6-50 MG per tablet 2 tablet, 2 tablet, Oral, BID PRN, Nishant Allan PA, 2 tablet at 08/09/22 1402  •  sennosides-docusate (PERICOLACE) 8.6-50 MG per tablet 2 tablet, 2 tablet, Oral, BID, Reilly Thomas MD, 2 tablet at 08/19/22 0909  •  simethicone (MYLICON) chewable tablet 80 mg, 80 mg, Oral, 4x Daily PRN, Daphne Lal MD  •  [COMPLETED] Insert peripheral IV, , , Once **AND** sodium chloride 0.9 % flush 10 mL, 10 mL, Intravenous, PRN, Nishant Allan, PA, 10 mL at 08/17/22 0415  •  sodium chloride 0.9 % flush 10 mL, 10 mL, Intravenous, Q12H, Nishant Allan PA, 10 mL at 08/18/22 2022  •  sodium chloride 0.9 % flush 10 mL, 10 mL, Intravenous, PRN, Nishant Allan, PA  •  sodium chloride 0.9 % flush 10 mL, 10 mL, Intravenous, Q12H, Rajani Farr, DO, 10 mL at 08/19/22 0920  •  sodium chloride 0.9 % flush 10 mL, 10 mL, Intravenous, PRN, Rajani Farr, DO, 10 mL at 08/16/22 1121  •  tamsulosin (FLOMAX) 24 hr capsule 0.4 mg, 0.4 mg, Oral, Daily, Luz Elena Batista DO, 0.4 mg at 08/19/22 0909  •  vitamin D3 capsule 5,000 Units, 5,000 Units, Oral, Daily, Nishant Allan PA, 5,000 Units at 08/19/22 0910    Facility-Administered Medications Ordered in Other Encounters:   •  Chlorhexidine Gluconate Cloth 2 % pads 1 application, 1 application, Topical, Q12H PRN, Mohan Arauz PA    Antibiotics:  Anti-Infectives (From admission, onward)    Ordered     Dose/Rate Route Frequency Start Stop    08/19/22 1219  amoxicillin-clavulanate (AUGMENTIN) 500-125 MG per tablet 500 mg        Ordering Provider:  Clifford Tavares RPH    1 tablet Oral Every 12 Hours Scheduled 22 1600 22 0859    22 1550  cefepime (MAXIPIME) 2 g/100 mL 0.9% NS (mbp)        Ordering Provider: Sage Braga MD    2 g  200 mL/hr over 30 Minutes Intravenous Once 22 1645 22 1640    22 1111  vancomycin 1500 mg/500 mL 0.9% NS IVPB ([facility])        Ordering Provider: Nishant Allan PA    15 mg/kg × 98 kg Intravenous Once 22 2200 22 2130    22 1209  vancomycin in dextrose 5% 150 mL (VANCOCIN) IVPB 750 mg        Ordering Provider: Migule Bray RPH    750 mg Intravenous Every 12 Hours 22 2200 22 0934    22 1209  vancomycin in dextrose 5% 150 mL (VANCOCIN) IVPB 750 mg        Ordering Provider: Miguel Bray RPH    750 mg  over 60 Minutes Intravenous Every 12 Hours 22 1300 22 1422    22 0014  piperacillin-tazobactam (ZOSYN) 3.375 g in iso-osmotic dextrose 50 ml (premix)        Ordering Provider: Eliana Hightower APRN    3.375 g  over 4 Hours Intravenous Every 8 Hours 22 0800 22 0303    22 2208  piperacillin-tazobactam (ZOSYN) 3.375 g in iso-osmotic dextrose 50 ml (premix)        Ordering Provider: Donny Hightower APRN    3.375 g  over 30 Minutes Intravenous Once 22 2210 22 0320    22 2208  vancomycin 2000 mg/500 mL 0.9% NS IVPB ([facility])        Ordering Provider: Donny Hightower APRN    20 mg/kg × 98 kg Intravenous Once 22 2210 22 0040            Review of Systems:  See HPI      Physical Exam:   Vital Signs  Temp (24hrs), Av.6 °F (36.4 °C), Min:96.6 °F (35.9 °C), Max:98.2 °F (36.8 °C)    Temp  Min: 96.6 °F (35.9 °C)  Max: 98.2 °F (36.8 °C)  BP  Min: 100/77  Max: 129/77  Pulse  Min: 59  Max: 64  Resp  Min: 17  Max: 18  SpO2  Min: 91 %  Max: 97 %    GENERAL: resting quietly on Supplemental oxygen per nasal cannula  HEENT: Normocephalic, atraumatic.  PERRL. EOMI. No conjunctival injection. No icterus.   HEART:  RRR; No murmur,  LUNGS: Clear to auscultation bilaterally   ABDOMEN: Soft, nontender,   :  Without Fonseca catheter.  MSK: Left foot Inspected the amputation site is clean dry and intact surrounding air  SKIN: Warm and dry without cutaneous eruptions on Inspection/palpation.        Laboratory Data    Results from last 7 days   Lab Units 08/19/22  0333 08/15/22  0615 08/13/22  2310   WBC 10*3/mm3 11.20* 9.63 14.07*   HEMOGLOBIN g/dL 10.7* 10.6* 10.2*   HEMATOCRIT % 31.9* 32.6* 31.2*   PLATELETS 10*3/mm3 160 199 226     Results from last 7 days   Lab Units 08/19/22  0333   SODIUM mmol/L 133*   POTASSIUM mmol/L 4.4   CHLORIDE mmol/L 93*   CO2 mmol/L 24.0   BUN mg/dL 85*   CREATININE mg/dL 3.23*   GLUCOSE mg/dL 225*   CALCIUM mg/dL 8.1*     Results from last 7 days   Lab Units 08/13/22  2310   ALK PHOS U/L 60   BILIRUBIN mg/dL 0.3   ALT (SGPT) U/L 13   AST (SGOT) U/L 12                 Results from last 7 days   Lab Units 08/15/22  0615   CK TOTAL U/L 171         Estimated Creatinine Clearance: 25.7 mL/min (A) (by C-G formula based on SCr of 3.23 mg/dL (H)).      Microbiology:  Microbiology Results (last 10 days)     Procedure Component Value - Date/Time    Eosinophil Smear - Urine, Urine, Clean Catch [[id number]]  (Normal) Collected: 08/14/22 1818    Lab Status: Final result Specimen: Urine, Clean Catch Updated: 08/14/22 2101     Eosinophil Smear 0 % EOS/100 Cells     Narrative:      No eosinophil seen                Radiology:  Imaging Results (Last 72 Hours)     Procedure Component Value Units Date/Time    XR Chest 1 View [[id number]] Collected: 08/18/22 0243     Updated: 08/18/22 0248    Narrative:      PROCEDURE:   XR CHEST 1 VW     HISTORY:   PICC placement    COMPARISONS: 8/16/2022    FINDINGS:     Poor visualization of the right arm PICC. Right arm PICC appears to show interval retraction now terminating within the right axillary region.    Persistent multifocal airspace opacities and interstitial prominence.  Similar blunting of the left costophrenic sulcus.    No pneumothorax.    Stable cardiomegaly.          Impression:      1. Interval retraction of the right arm PICC now terminates within the right mid axillary region.  2. Persistent edema and/or infection.  3. Persistent small left pleural effusion.    Electronically signed by:  John Morocho D.O.    8/18/2022 12:47 AM Mountain Time        Estimated Creatinine Clearance: 25.7 mL/min (A) (by C-G formula based on SCr of 3.23 mg/dL (H)).      Impression:   Leukocytosis with neutrophilia, improved.   Lactic acidosis  Left great toe wound- osteomyelitis by bone scan- amputation recommended by Dr. Márquez   Status post fall  Diabetes mellitus type 2  Left shoulder dislocation  Probable peripheral vascular disease  Acute renal failure, ongoing.   Elevated proBNP/difficulty diuresis given renal failure.  Right upper chest wall pain, ongoing.     PLAN/RECOMMENDATIONS:     cont unasyn okay to reduce dose to every 12 hours given elevation of creatinine and     Plan was to continue IV antibiotic for 6 weeks from 8/2/22  however PICC line was removed by patient accidentally; given  patient's worsening kidney function and renal failure I would try to avoid a repeat picc line.    Left foot wound looks clinically well    Given that toe was amputated and I suspect good surgical margins were obtained I am willing to treat with oral Augmentin to complete a 6 week course from 8/2/22 (9/13/22)      Defer renal failure management to nephrology consider offering albumin infusions    Complicated case  Sage Braga MD  8/19/2022  13:16 EDT

## 2022-08-19 NOTE — PROGRESS NOTES
"   LOS: 23 days    Patient Care Team:  Farooq Painter MD as PCP - General (Family Medicine)  Jermaine Hernandez MD as Consulting Physician (Cardiology)    Chief Complaint:   77-year-old male  left foot to infection has been treated with antibiotic.  Initial creatinine has been within acceptable range, patient received IV vancomycin on starting 7/27/2022, was continued until 8/4/2022 at that time the creatinine started to go up to 2.02 patient's antibiotic was changed, taken off the vancomycin at that time creatinine started to get improved 1.8 slowly over the last 3 days creatinine has been slowly creeping up.  Patient has a past medical history of CAD, CABG, hyperlipidemia, diabetes type 2, hypertension, hypothyroidism, peripheral vascular disease, sick sinus syndrome with pacemaker placement. creatinine 2.07 BUN of 51, albumin 2.9, white count 14.2, platelets 209 normal. Patient is on ampicillin IV, and IV Solu-Medrol 60 mg.  Subjective     Interval History:   UOP ~ 1.2 liter. Relatively unchanged renal function. Resp slight better today     Review of Systems:   Complain generalized weakness other than that all other systems are negative.    Objective     Vital Sign Min/Max for last 24 hours  Temp  Min: 96.6 °F (35.9 °C)  Max: 98.2 °F (36.8 °C)   BP  Min: 100/77  Max: 129/77   Pulse  Min: 59  Max: 64   Resp  Min: 17  Max: 18   SpO2  Min: 91 %  Max: 97 %   Flow (L/min)  Min: 2.5  Max: 3   Weight  Min: 110 kg (243 lb)  Max: 110 kg (243 lb)     Flowsheet Rows    Flowsheet Row First Filed Value   Admission Height 190.5 cm (75\") Documented at 07/27/2022 1848   Admission Weight 98 kg (216 lb) Documented at 07/27/2022 1848          I/O this shift:  In: 466 [P.O.:466]  Out: 100 [Urine:100]  I/O last 3 completed shifts:  In: 400 [P.O.:400]  Out: 1700 [Urine:1700]    Physical Exam:  General Appearance: Awake alert oriented  male sitting comfortably in chair.  Eyes: PER, EOMI.  Neck: Supple no JVD.  Lungs: Clear " auscultation, no rales rhonchi's, equal chest movement, nonlabored.  Heart: No gallop, murmur, rub, RRR.  Abdomen: Soft, nontender, positive bowel sounds, no organomegaly.  Extremities: No edema, no cyanosis.  Left toe in bandage  Neuro: No focal deficit, moving all extremities, alert oriented X 3   no suprapubic fullness or tenderness  WBC WBC   Date Value Ref Range Status   08/19/2022 11.20 (H) 3.40 - 10.80 10*3/mm3 Final      HGB Hemoglobin   Date Value Ref Range Status   08/19/2022 10.7 (L) 13.0 - 17.7 g/dL Final      HCT Hematocrit   Date Value Ref Range Status   08/19/2022 31.9 (L) 37.5 - 51.0 % Final      Platlets No results found for: LABPLAT   MCV MCV   Date Value Ref Range Status   08/19/2022 87.2 79.0 - 97.0 fL Final          Sodium Sodium   Date Value Ref Range Status   08/19/2022 133 (L) 136 - 145 mmol/L Final   08/18/2022 135 (L) 136 - 145 mmol/L Final   08/17/2022 137 136 - 145 mmol/L Final      Potassium Potassium   Date Value Ref Range Status   08/19/2022 4.4 3.5 - 5.2 mmol/L Final     Comment:     Slight hemolysis detected by analyzer. Results may be affected.   08/18/2022 4.1 3.5 - 5.2 mmol/L Final     Comment:     Slight hemolysis detected by analyzer. Results may be affected.   08/17/2022 4.6 3.5 - 5.2 mmol/L Final      Chloride Chloride   Date Value Ref Range Status   08/19/2022 93 (L) 98 - 107 mmol/L Final   08/18/2022 96 (L) 98 - 107 mmol/L Final   08/17/2022 96 (L) 98 - 107 mmol/L Final      CO2 CO2   Date Value Ref Range Status   08/19/2022 24.0 22.0 - 29.0 mmol/L Final   08/18/2022 26.0 22.0 - 29.0 mmol/L Final   08/17/2022 28.0 22.0 - 29.0 mmol/L Final      BUN BUN   Date Value Ref Range Status   08/19/2022 85 (H) 8 - 23 mg/dL Final   08/18/2022 82 (H) 8 - 23 mg/dL Final   08/17/2022 78 (H) 8 - 23 mg/dL Final      Creatinine Creatinine   Date Value Ref Range Status   08/19/2022 3.23 (H) 0.76 - 1.27 mg/dL Final   08/18/2022 3.17 (H) 0.76 - 1.27 mg/dL Final   08/17/2022 2.66 (H) 0.76 -  1.27 mg/dL Final      Calcium Calcium   Date Value Ref Range Status   08/19/2022 8.1 (L) 8.6 - 10.5 mg/dL Final   08/18/2022 8.3 (L) 8.6 - 10.5 mg/dL Final   08/17/2022 8.2 (L) 8.6 - 10.5 mg/dL Final      PO4 No results found for: CAPO4   Albumin No results found for: ALBUMIN   Magnesium No results found for: MG   Uric Acid No results found for: URICACID        Results Review:     I reviewed the patient's new clinical results.    amLODIPine, 10 mg, Oral, Daily  amoxicillin-clavulanate, 1 tablet, Oral, Q12H  apixaban, 10 mg, Oral, Q12H   Followed by  [START ON 8/21/2022] apixaban, 5 mg, Oral, Q12H  aspirin, 81 mg, Oral, Daily  bethanechol, 10 mg, Oral, TID  cetirizine, 5 mg, Oral, Daily  insulin detemir, 15 Units, Subcutaneous, Nightly  insulin lispro, 0-9 Units, Subcutaneous, TID AC  Insulin Lispro, 5 Units, Subcutaneous, TID With Meals  levothyroxine, 50 mcg, Oral, Q AM  metoprolol succinate XL, 50 mg, Oral, Q24H  polyethylene glycol, 17 g, Oral, Daily  senna-docusate sodium, 2 tablet, Oral, BID  sodium chloride, 10 mL, Intravenous, Q12H  sodium chloride, 10 mL, Intravenous, Q12H  tamsulosin, 0.4 mg, Oral, Daily  vitamin D3, 5,000 Units, Oral, Daily           Medication Review: Reviewed    Assessment & Plan       Sepsis (HCC)    Type 2 diabetes mellitus ([facility])    Hypertension    Hyperlipidemia    Hypothyroidism (acquired)    S/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 Au    SSS (sick sinus syndrome) ([facility])    Dislocation of left shoulder joint, initial encounter    Cellulitis in diabetic foot ([facility])    Fall    Lactic acidosis    Proximal phalanx fracture of the second digit extending into the second metatarsal joint    Acute renal failure (ARF) ([facility])    Acute systolic heart failure ([facility])    DVT, bilateral lower limbs ([facility])    Acute respiratory failure with hypoxia ([facility])        1.  Acute kidney injury: Baseline creatinine 1.02-1.05.  Patient was admitted and was started on antibiotic for osteomyelitis.  He received  vancomycin which was started on 7/27/2022 until 8/4/2022 due to acute rise in creatinine to 2.07.  Renal artery duplex negative for renal artery stenosis.  Ultrasound kidney showed normal size kidney right 11.6 cm, left 11.1 cm.  Normal bladder.  CK1 71, C4 complement 34, C3 complement 137, TRISH negative, urine eos negative, urine protein 57, urine creatinine 150,Normal ultrasound kidney without  any obstruction.  No renal artery stenosis.  2.  Osteomyelitis left toe.  3.  Type 2 diabetes.  4.  History of hypertension  5.  History of hyperlipidemia.  6.  CAD s/p CABG 3/22/2019 by Dr. Au's.  7.  Sick sinus syndrome.  8.  Proteinuria: Protein creatinine ratio 0.38 g      Recommendations plan.  Worsening renal function .Etiology of [place] thought to be ATN ( vancomycin related). Given urinary retention and worsening renal function now has medel cath. High risk for needing dialysis. Recommend aoviding PICC line if possible. Continue bumex 2 mg IV for optimization of volume status. Daily labs. Strict I/O. Discussed with patient and wife at the bedside.      Blake Tilley MD  08/19/22  13:12 EDT

## 2022-08-19 NOTE — THERAPY TREATMENT NOTE
Patient Name: Jermaine Singh  : 1945    MRN: [id number]                              Today's Date: 2022       Admit Date: 2022    Visit Dx:     ICD-10-CM ICD-9-CM   1. Dislocation of left shoulder joint, initial encounter  S43.005A 831.00   2. Cellulitis of left foot  L03.116 682.7   3. 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  Preserved LV function   • Type 2 diabetes mellitus (HCC)   • Hypertension   • Hyperlipidemia   • Hypothyroidism (acquired)   • Vitamin D deficiency on Rx    • Serum Cr 1.32 on admission.  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 Au   • Dizziness   • Atherosclerosis of native artery of both lower extremities with intermittent claudication ([facility])   • SSS (sick sinus syndrome) ([facility])   • Dislocation of left shoulder joint, initial encounter   • Cellulitis in diabetic foot ([facility])   • Fall   • Sepsis ([facility])   • Lactic acidosis   • Proximal phalanx fracture of the second digit extending into the second metatarsal joint   • Acute renal failure (ARF) ([facility])   • Acute systolic heart failure ([facility])   • DVT, bilateral lower limbs ([facility])   • Acute respiratory failure with hypoxia ([facility])     Past Medical History:   Diagnosis Date   • Asthma    • Coronary artery disease    • Diabetes mellitus (HCC)     started on [place] 2018; started on po meds in ; checking blood sugars daily    • Disease of thyroid gland     po meds daily for hypothyroidism    • History of fracture as a child     rt leg- severe    • Hyperlipidemia    • Hypertension    • Hypothyroidism    • Peripheral neuropathy    • Peripheral vascular disease (HCC)     s/p angiogram -needs stent in left leg    • RBBB    • Right knee pain    • Vitamin D deficiency      Past Surgical History:   Procedure Laterality Date   • APPENDECTOMY     • CARDIAC  CATHETERIZATION N/A 2/14/2019    Procedure: Left Heart Cath;  Surgeon: Cooper Apodaca MD;  Location:  [facility]AS[facility];  Service: Cardiology   • CARDIAC ELECTROPHYSIOLOGY PROCEDURE N/A 5/18/2022    Procedure: DEVICE IMPLANT;  Surgeon: Cooper Apodaca MD;  Location:  [facility];  Service: Cardiology;  Laterality: N/A;   • COLONOSCOPY     • CORONARY ARTERY BYPASS GRAFT N/A 3/22/2019    Procedure: MEDIAN STERNOTOMY, CORONARY ARTERY BYPASS GRAFT X3, UTILIZING THE LEFT INTERNAL MAMMARY ARTERY, EVH AND OPEN HARVEST OF THE RIGHT GREATER SAPHENOUS VEIN, EXPLORATION OF THE LEFT LEG;  Surgeon: Ap Au MD;  Location:  [facility] OR;  Service: Cardiothoracic   • EYE SURGERY Bilateral     cataracts    • INTERVENTIONAL RADIOLOGY PROCEDURE N/A 7/29/2021    Procedure: Abdominal Aortagram with Runoff;  Surgeon: Jermaine Hernandez MD;  Location:  [facility];  Service: Cardiovascular;  Laterality: N/A;   • KNEE ARTHROSCOPY      right x 2, left x 1   • LACERATION REPAIR      right leg   • LEG SURGERY      2 for fracture of rt leg    • TONSILLECTOMY      Adnoidectomy   • TRANS METATARSAL AMPUTATION Left 8/2/2022    Procedure: GREAT TOE AMPUTATION LEFT;  Surgeon: Gopal Márquez MD;  Location:  [facility] OR;  Service: Vascular;  Laterality: Left;      General Information     Row Name 08/19/22 0901          Physical Therapy Time and Intention    Document Type therapy note (daily note)  -AS     Mode of Treatment physical therapy  -AS     Row Name 08/19/22 0901          General Information    Patient Profile Reviewed yes  -AS     Existing Precautions/Restrictions fall;left;non-weight bearing;other (see comments);oxygen therapy device and L/min  L UE WBAT(no Abduction or ER), NWB L LE w/offloading shoe  -AS     Barriers to Rehab medically complex;previous functional deficit  -AS     Row Name 08/19/22 0901          Cognition    Orientation Status (Cognition) oriented x 4  -AS      Row Name 08/19/22 0901          Safety Issues, Functional Mobility    Safety Issues Affecting Function (Mobility) safety precaution awareness;safety precautions follow-through/compliance;ability to follow commands;at risk behavior observed;insight into deficits/self-awareness;judgment  -AS     Impairments Affecting Function (Mobility) endurance/activity tolerance;pain;range of motion (ROM);strength;shortness of breath;cognition  -AS     Cognitive Impairments, Mobility Safety/Performance awareness, need for assistance;insight into deficits/self-awareness;safety precaution awareness;safety precaution follow-through;sequencing abilities  -AS     Comment, Safety Issues/Impairments (Mobility) alert, patient unable to maintain NWB L LE with sit<>stands, attempted x2 trials  -AS           User Key  (r) = Recorded By, (t) = Taken By, (c) = Cosigned By    Initials Name Provider Type    AS Macrina Ku PTA Physical Therapist Assistant               Mobility     Row Name 08/19/22 0903          Bed Mobility    Rolling Left [place] (Bed Mobility) verbal cues;minimum assist (75% patient effort);1 person assist  -AS     Rolling Right [place] (Bed Mobility) minimum assist (75% patient effort);1 person assist;verbal cues  -AS     Supine-Sit [place] (Bed Mobility) not tested  -AS     Sit-Supine [place] (Bed Mobility) not tested  -AS     Assistive Device (Bed Mobility) bed rails;head of bed elevated  -AS     Comment, (Bed Mobility) rolled side to side to be cleaned secondary to BM and to place lift sling for safe transfer to recliner  -AS     Row Name 08/19/22 0903          Transfers    Comment, (Transfers) attempted 2 sit<>stands from recliner with B UE support on bedrail, despite max verbal cues and education on NWB status patient unable to maintain NWB L LE, patient was able to reached full upright posture only with weight bearing, patient easily agitated with education regarding NWB status.  -AS     [place]  Name 08/19/22 0903          Bed-Chair Transfer    Bed-Chair [place] (Transfers) dependent (less than 25% patient effort);2 person assist  -AS     Assistive Device (Bed-Chair Transfers) lift device  -AS     [facility] Name 08/19/22 0903          Sit-Stand Transfer    Sit-Stand [place] (Transfers) verbal cues;maximum assist (25% patient effort);2 person assist  -AS     Comment, (Sit-Stand Transfer) B UE support on bed rail  -AS     [facility] Name 08/19/22 0903          Gait/Stairs (Locomotion)    [place] Level (Gait) not tested  -AS     [facility] Name 08/19/22 0903          Mobility    Extremity Weight-bearing Status left upper extremity;left lower extremity  -AS     Left Upper Extremity (Weight-bearing Status) weight-bearing as tolerated (WBAT)  -AS     Left Lower Extremity (Weight-bearing Status) non weight-bearing (NWB)  -AS           User Key  (r) = Recorded By, (t) = Taken By, (c) = Cosigned By    Initials Name Provider Type    AS Macrina Ku PTA Physical Therapist Assistant               Obj/Interventions     [facility] Name 08/19/22 0908          Motor Skills    Therapeutic Exercise knee;ankle;hip  -AS     Row Name 08/19/22 0908          Hip (Therapeutic Exercise)    Hip Isometrics (Therapeutic Exercise) bilateral;gluteal sets;supine;10 repetitions;5 second hold  -AS     Row Name 08/19/22 0908          Knee (Therapeutic Exercise)    Knee (Therapeutic Exercise) strengthening exercise  -AS     Knee Strengthening (Therapeutic Exercise) bilateral;marching while seated;LAQ (long arc quad);sitting;10 repetitions  verbal cues for technique and slow controlled movments  -AS     Row Name 08/19/22 0908          Ankle (Therapeutic Exercise)    Ankle (Therapeutic Exercise) AROM (active range of motion)  -AS     Ankle AROM (Therapeutic Exercise) right;dorsiflexion;plantarflexion;sitting;10 repetitions  -AS           User Key  (r) = Recorded By, (t) = Taken By, (c) = Cosigned By    Initials Name Provider Type    AS [place]  Macrina Caldwell PTA Physical Therapist Assistant               Goals/Plan    No documentation.                Clinical Impression     Row Name 08/19/22 0909          Pain    Pretreatment Pain Rating 0/10 - no pain  -AS     Posttreatment Pain Rating 0/10 - no pain  -AS     Row Name 08/19/22 0909          Plan of Care Review    Plan of Care Reviewed With patient  -AS     Progress no change  -AS     Outcome Evaluation patient transfered bed>recliner via lift for safety, attempted 2 sit<>stands from recliner with B UE support on bedrail, despite max verbal cues and education on NWB status patient unable to maintain NWB L LE, patient was able to reach full upright posture but unable to maintain NWB, patient easily agitated with education regarding NWB status. Completed B LE exercises.  -AS     Row Name 08/19/22 0909          Vital Signs    Pre Systolic BP Rehab 110  -AS     Pre Treatment Diastolic BP 59  -AS     Intra Systolic BP Rehab 115  -AS     Intra Treatment Diastolic BP 63  -AS     Post Systolic BP Rehab 108  -AS     Post Treatment Diastolic BP 61  -AS     Pre SpO2 (%) 96  -AS     O2 Delivery Pre Treatment supplemental O2  -AS     Intra SpO2 (%) 90  -AS     O2 Delivery Intra Treatment room air  -AS     Post SpO2 (%) 96  -AS     O2 Delivery Post Treatment supplemental O2  -AS     Pre Patient Position Supine  -AS     Intra Patient Position Sitting  -AS     Post Patient Position Sitting  -AS     Row Name 08/19/22 0909          Positioning and Restraints    Pre-Treatment Position in bed  -AS     Post Treatment Position chair  -AS     In Chair reclined;call light within reach;encouraged to call for assist;exit alarm on;with family/caregiver;on mechanical lift sling;waffle cushion;legs elevated  -AS           User Key  (r) = Recorded By, (t) = Taken By, (c) = Cosigned By    Initials Name Provider Type    AS Macrina Ku PTA Physical Therapist Assistant               Outcome Measures     Row Name 08/19/22 0912           How much help from another person do you currently need...    Turning from your back to your side while in flat bed without using bedrails? 3  -AS     Moving from lying on back to sitting on the side of a flat bed without bedrails? 3  -AS     Moving to and from a bed to a chair (including a wheelchair)? 1  -AS     Standing up from a chair using your arms (e.g., wheelchair, bedside chair)? 2  -AS     Climbing 3-5 steps with a railing? 1  -AS     To walk in hospital room? 1  -AS     AM-PAC 6 Clicks Score (PT) 11  -AS     Highest level of mobility 4 --> Transferred to chair/commode  -AS     Row Name 08/19/22 0912          Functional Assessment    Outcome Measure Options AM-PAC 6 Clicks Basic Mobility (PT)  -AS           User Key  (r) = Recorded By, (t) = Taken By, (c) = Cosigned By    Initials Name Provider Type    AS Macrina Ku PTA Physical Therapist Assistant                             Physical Therapy Education                 Title: PT OT SLP Therapies (In Progress)     Topic: Physical Therapy (In Progress)     Point: Mobility training (In Progress)     Learning Progress Summary           Patient Acceptance, E, NR by AS at 8/19/2022 0915   Family Acceptance, E,D, VU,NR by  at 8/17/2022 1042      Show all documentation for this point (12)                 Point: Home exercise program (In Progress)     Learning Progress Summary           Patient Acceptance, E, NR by AS at 8/19/2022 0915   Family Acceptance, E,D, VU,NR by  at 8/17/2022 1042      Show all documentation for this point (12)                 Point: Body mechanics (In Progress)     Learning Progress Summary           Patient Acceptance, E, NR by AS at 8/19/2022 0915   Family Acceptance, E,D, VU,NR by HP at 8/17/2022 1042      Show all documentation for this point (12)                 Point: Precautions (In Progress)     Learning Progress Summary           Patient Acceptance, E, NR by AS at 8/19/2022 0915   Family Acceptance, E,D, VU,NR  by HP at 8/17/2022 1042      Show all documentation for this point (12)                             User Key     Initials Effective Dates Name Provider Type Discipline    AS 06/16/21 -  Macrina Ku PTA Physical Therapist Assistant PT    HP 06/01/21 -  Thelma Rich PT Physical Therapist PT              PT Recommendation and Plan     Plan of Care Reviewed With: patient  Progress: no change  Outcome Evaluation: patient transfered bed>recliner via lift for safety, attempted 2 sit<>stands from recliner with B UE support on bedrail, despite max verbal cues and education on NWB status patient unable to maintain NWB L LE, patient was able to reach full upright posture but unable to maintain NWB, patient easily agitated with education regarding NWB status. Completed B LE exercises.     Time Calculation:    PT Charges     Row Name 08/19/22 0915             Time Calculation    Start Time 0826  -AS      PT Received On 08/19/22  -AS      PT Goal Re-Cert Due Date 08/23/22  -AS              Timed Charges    89438 - PT Therapeutic Exercise Minutes 10  -AS      45271 - PT Therapeutic Activity Minutes 15  -AS              Total Minutes    Timed Charges Total Minutes 25  -AS       Total Minutes 25  -AS            User Key  (r) = Recorded By, (t) = Taken By, (c) = Cosigned By    Initials Name Provider Type    AS Macrina Ku PTA Physical Therapist Assistant              Therapy Charges for Today     Code Description Service Date Service Provider Modifiers Qty    64911669327 HC PT THER PROC EA 15 MIN 8/19/2022 Macrina Ku PTA GP 1    90386934485 HC PT THERAPEUTIC ACT EA 15 MIN 8/19/2022 Macrina Ku PTA GP 1    76076785112 HC PT THER SUPP EA 15 MIN 8/19/2022 Macrina Ku PTA GP 2          PT G-Codes  Outcome Measure Options: AM-PAC 6 Clicks Basic Mobility (PT)  AM-PAC 6 Clicks Score (PT): 11  AM-PAC 6 Clicks Score (OT): 11    Macrina Ku PTA  8/19/2022

## 2022-08-19 NOTE — PLAN OF CARE
Goal Outcome Evaluation:           Patient AAOX4. Pt vital signs stable. No acute events over night. Pt medel care completed. Tele reads paced rhythm. Will continue to monitor for remainder of shift.     Thank you,     LS

## 2022-08-19 NOTE — PLAN OF CARE
Problem: Adult Inpatient Plan of Care  Goal: Plan of Care Review  Recent Flowsheet Documentation  Taken 8/19/2022 1527 by Guerline Lazcano OT  Progress: no change  Plan of Care Reviewed With: patient  Outcome Evaluation: Pt lethargic upon OT arrival, agreeable to TE at LUE yet deferred progressive fxl transfers and ADL retraining indicating preference to rest in recliner. Pt tolerated LUE TE as indicated by MD with distal to proximal approach including hand, wrist, elbow AROM and AAROM shoulder flexion/ext to ~ 100 degrees prior to increase in pain. Pt this date tolerated increase in ROM likely impacted by position of grossly supine in recliner. No ABDuction or ER completed. ROM completed to facilitate joint mobility and improved integration of UEs in ADLs, related t/fs. Will progress pt while hospitalized with plan to progress ADL retraining and related mobility next session as pt tolerates.

## 2022-08-19 NOTE — PROGRESS NOTES
[facility] Medicine Services  PROGRESS NOTE    Patient Name: Jermaine Singh  : 1945  MRN: [id number]    Date of Admission: 2022  Primary Care Physician: Farooq Painter MD    Subjective   Subjective     CC:  F/U osteomyelitis    HPI:  Patient seen and examined. Wife at bedside. Pt worked with therapy this AM but tried to bear weight on his foot. Up in chair currently. Had FC placed yesterday per Renal. Received Bumex yesterday. Currently on 3L, was on 2.5L yesterday.     ROS:  Gen- No fevers, chills  CV- No chest pain, palpitations  Resp- No cough, dyspnea  GI- No N/V/D, abd pain    Objective   Objective     Vital Signs:   Temp:  [96.6 °F (35.9 °C)-98.2 °F (36.8 °C)] 98 °F (36.7 °C)  Heart Rate:  [60-64] 60  Resp:  [18] 18  BP: (100-129)/(67-83) 100/77  Flow (L/min):  [2.5-3] 3     Physical Exam:  Constitutional: No acute distress, awake, alert, up in chair   HENT: NCAT, mucous membranes moist  Respiratory: Decreased in bases/Occ Rales, respiratory effort normal 3L NC  Cardiovascular: RRR, no murmurs, rubs, or gallops  Gastrointestinal: Positive bowel sounds, soft, nontender, nondistended  Musculoskeletal: No bilateral ankle edema, PICC has been removed RUE. Offloading shoe on L foot   Psychiatric: Appropriate affect, cooperative  Neurologic: Oriented x 3, speech clear, ALBARADO  Skin: No rashes    Results Reviewed:  LAB RESULTS:      Lab 22  0333 08/15/22  0615 22  1132 22  0116 22  2310 22  1818 22  1224   WBC 11.20* 9.63  --   --  14.07*  --  14.42*   HEMOGLOBIN 10.7* 10.6*  --   --  10.2*  --  10.5*   HEMATOCRIT 31.9* 32.6*  --   --  31.2*  --  31.4*   PLATELETS 160 199  --   --  226  --  209   NEUTROS ABS  --   --   --   --  11.61*  --  12.08*   IMMATURE GRANS (ABS)  --   --   --   --  0.06*  --  0.05   LYMPHS ABS  --   --   --   --  1.13  --  1.06   MONOS ABS  --   --   --   --  1.17*  --  1.17*   EOS ABS  --   --   --   --  0.04  --  0.01    MCV 87.2 90.1  --   --  89.4  --  88.2   PROTIME  --   --   --   --   --   --  13.8   APTT  --   --   --   --   --   --  29.7*   HEPARIN ANTI-XA  --   --  0.10* 0.49  --  0.37 0.10*         Lab 08/19/22  0333 08/18/22  0327 08/17/22  0507 08/15/22  0615 08/13/22  2310   SODIUM 133* 135* 137 138 139   POTASSIUM 4.4 4.1 4.6 4.9 4.0   CHLORIDE 93* 96* 96* 97* 99   CO2 24.0 26.0 28.0 27.0 27.0   ANION GAP 16.0* 13.0 13.0 14.0 13.0   BUN 85* 82* 78* 64* 51*   CREATININE 3.23* 3.17* 2.66* 2.16* 2.07*   EGFR 19.0* 19.4* 24.0* 30.8* 32.4*   GLUCOSE 225* 63* 232* 225* 151*   CALCIUM 8.1* 8.3* 8.2* 8.8 8.5*   MAGNESIUM  --   --   --   --  2.7*   PHOSPHORUS  --   --   --  5.5*  --          Lab 08/15/22  0615 08/13/22  2310   TOTAL PROTEIN  --  6.1   ALBUMIN 3.60 2.90*   GLOBULIN  --  3.2   ALT (SGPT)  --  13   AST (SGOT)  --  12   BILIRUBIN  --  0.3   ALK PHOS  --  60         Lab 08/13/22  2310 08/13/22  1224 08/13/22  0730 08/12/22  1653   PROBNP 31,919.0*  --   --   --    TROPONIN T 0.230* 0.229* 0.230* 0.206*   PROTIME  --  13.8  --   --    INR  --  1.07  --   --                  Brief Urine Lab Results  (Last result in the past 365 days)      Color   Clarity   Blood   Leuk Est   Nitrite   Protein   CREAT   Urine HCG        08/14/22 1818             149.9               Microbiology Results Abnormal     Procedure Component Value - Date/Time    Eosinophil Smear - Urine, Urine, Clean Catch [[id number]]  (Normal) Collected: 08/14/22 1818    Lab Status: Final result Specimen: Urine, Clean Catch Updated: 08/14/22 2101     Eosinophil Smear 0 % EOS/100 Cells     Narrative:      No eosinophil seen    Anaerobic Culture - Tissue, Toe, Left [[id number]] Collected: 08/02/22 1027    Lab Status: Final result Specimen: Tissue from Toe, Left Updated: 08/07/22 0858     Anaerobic Culture No anaerobes isolated at 5 days    Blood Culture - Blood, Hand, Left [[id number]]  (Normal) Collected: 07/27/22 2040    Lab Status: Final result Specimen:  Blood from Hand, Left Updated: 08/01/22 2132     Blood Culture No growth at 5 days    Blood Culture - Blood, Hand, Right [[id number]]  (Normal) Collected: 07/27/22 2030    Lab Status: Final result Specimen: Blood from Hand, Right Updated: 08/01/22 2132     Blood Culture No growth at 5 days    COVID PRE-OP / PRE-PROCEDURE SCREENING ORDER (NO ISOLATION) - Swab, Nasopharynx [[id number]]  (Normal) Collected: 07/27/22 2246    Lab Status: Final result Specimen: Swab from Nasopharynx Updated: 07/27/22 2357    Narrative:      The following orders were created for panel order COVID PRE-OP / PRE-PROCEDURE SCREENING ORDER (NO ISOLATION) - Swab, Nasopharynx.  Procedure                               Abnormality         Status                     ---------                               -----------         ------                     COVID-19 and FLU A/B PCR...[[id number]]  Normal              Final result                 Please view results for these tests on the individual orders.    COVID-19 and FLU A/B PCR - Swab, Nasopharynx [[id number]]  (Normal) Collected: 07/27/22 2246    Lab Status: Final result Specimen: Swab from Nasopharynx Updated: 07/27/22 2357     COVID19 Not Detected     Influenza A PCR Not Detected     Influenza B PCR Not Detected    Narrative:      Fact sheet for providers: https://www.fda.gov/media/071249/download    Fact sheet for patients: https://www.fda.gov/media/061167/download    Test performed by PCR.          XR Chest 1 View    Result Date: 8/18/2022  PROCEDURE:   XR CHEST 1 VW HISTORY:   PICC placement COMPARISONS: 8/16/2022 FINDINGS: Poor visualization of the right arm PICC. Right arm PICC appears to show interval retraction now terminating within the right axillary region. Persistent multifocal airspace opacities and interstitial prominence. Similar blunting of the left costophrenic sulcus. No pneumothorax. Stable cardiomegaly.     Impression: 1. Interval retraction of the right arm PICC now terminates  within the right mid axillary region. 2. Persistent edema and/or infection. 3. Persistent small left pleural effusion. Electronically signed by:  John Morocho D.O.  8/18/2022 12:47 AM Mountain Time      Results for orders placed during the hospital encounter of 07/27/22    Adult Transthoracic Echo Complete W/ Cont if Necessary Per Protocol    Interpretation Summary  · Left ventricular ejection fraction appears to be 46 - 50%. Left ventricular systolic function is low normal.  · Left ventricular septal hypokinesis  · No significant valvular heart disease      I have reviewed the medications:  Scheduled Meds:amLODIPine, 10 mg, Oral, Daily  amoxicillin-clavulanate, 1 tablet, Oral, Q12H  apixaban, 10 mg, Oral, Q12H   Followed by  [START ON 8/21/2022] apixaban, 5 mg, Oral, Q12H  aspirin, 81 mg, Oral, Daily  bethanechol, 10 mg, Oral, TID  cetirizine, 5 mg, Oral, Daily  insulin detemir, 10 Units, Subcutaneous, Nightly  insulin lispro, 0-9 Units, Subcutaneous, TID AC  levothyroxine, 50 mcg, Oral, Q AM  metoprolol succinate XL, 50 mg, Oral, Q24H  polyethylene glycol, 17 g, Oral, Daily  senna-docusate sodium, 2 tablet, Oral, BID  sodium chloride, 10 mL, Intravenous, Q12H  sodium chloride, 10 mL, Intravenous, Q12H  tamsulosin, 0.4 mg, Oral, Daily  vitamin D3, 5,000 Units, Oral, Daily      Continuous Infusions:   PRN Meds:.•  acetaminophen **OR** acetaminophen **OR** acetaminophen  •  benzocaine-menthol  •  bisacodyl  •  calcium carbonate  •  dextrose  •  dextrose  •  glucagon (human recombinant)  •  HYDROcodone-acetaminophen  •  ipratropium-albuterol  •  nitroglycerin  •  ondansetron **OR** ondansetron  •  phenol  •  prochlorperazine  •  sennosides-docusate  •  simethicone  •  [COMPLETED] Insert peripheral IV **AND** sodium chloride  •  sodium chloride  •  sodium chloride    Assessment & Plan   Assessment & Plan     Active Hospital Problems    Diagnosis  POA   • **Sepsis (HCC) [A41.9]  Yes   • Acute renal failure (ARF)  ([facility]) [N17.9]  Yes   • Acute systolic heart failure ([facility]) [I50.21]  No   • DVT, bilateral lower limbs ([facility]) [I82.403]  No   • Acute respiratory failure with hypoxia ([facility]) [J96.01]  No   • Proximal phalanx fracture of the second digit extending into the second metatarsal joint [S92.919A]  Yes   • Dislocation of left shoulder joint, initial encounter [S43.005A]  Yes   • Cellulitis in diabetic foot ([facility]) [E11.628, L03.119]  Yes   • Fall [W19.XXXA]  Yes   • Lactic acidosis [E87.2]  Yes   • SSS (sick sinus syndrome) ([facility]) [I49.5]  Yes   • Hyperlipidemia [E78.5]  Yes   • Hypertension [I10]  Yes   • S/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 Au [Z95.1]  Not Applicable   • Hypothyroidism (acquired) [E03.9]  Yes   • Type 2 diabetes mellitus ([facility]) [E11.9]  Yes      Resolved Hospital Problems   No resolved problems to display.        Brief Hospital Course to date:  Jermaine Singh is a 77 y.o. male with PMH significant for HTN, HLD, CAD s/p CABG, PVD s/p RLE stent, CKD III, insulin-dependent DMII and hypothyroidism. He was admitted to [facility] 7/27/22 for L shoulder dislocation after a fall as well as sepsis secondary to L great toe osteomyelitis.     This patient's problems and plans were partially entered by my partner and updated as appropriate by me 08/19/22.     L great toe cellulitis / osteomyelitis  Peripheral vascular disease   - Followed by Dr. Joseph of podiatry - was on PO Doxycycline outpatient  - Bone scan was concerning for osteomyelitis of L great toe, now s/p L great toe amputation by Dr. Márquez 8/2/22   - arterial duplex obtained. Cardiology did not recommend revascularization.  - tissue culture growing enterococcus faecalis and enterococcus casseliflavus  - ID following - Unasyn DC'd, now on Augmentin BID. Discussed with Dr Braga this AM   - PICC originally placed 8/10, pulled partially out by patient 8/18 and Charge RN removed later that morning. Will not replace at this time since may need HD and ABX  PO .    Acute Hypoxic Respiratory Failure  Acute Systolic CHF  Elevated troponin  -Has some mild acute systolic heart failure with EF of 46 to 50%, though cardiology note says more like 40% based on relook.  -VQ scan was read as low probability of PE  -Minimal troponin elevation peaked at 0.230, cardiology following and feels it is related to demand ischemia  -Has been receiving intermittent diuresis, last dose of Bumex yesterday, 2mg IV     Acute renal failure  - Baseline creatinine appears to be 0.9-1.2  - Creatinine continues to trend up (received Bumex yesterday)   - renal US normal, artery duplex normal  - appreciate renal assistance  - If worsens, may be at risk for dialysis, patient aware     Bilateral LE DVT's  -Initially placed on heparin drip which has now been transitioned to Eliquis  -VQ scan low probability     Fall   Anterior L shoulder dislocation  - s/p reduction in the ED  - Appreciate ortho (Dr. Gipson) assistance   - Recommend non-operative treatment. Sling for comfort.   - PT for range of motion exercises, avoid Abduction and external rotation. WBAT on LUE with walker or knee scooter  - Follow up with Dr. Gipson in 2-3 weeks   - stable today    Constipation  Nausea/Vomiting  -Continue bowel regimen     Insulin-dependent DMII  - Hgb A1c 9.0%  - Increase Levemir to 15 units nightly  - Add 5 units Humalog TID meals   - Continue Mod Dose SSI     Hypertension  Coronary artery disease s/p CABG  SSS s/p PPM   - Cardiology has stopped ACEI and added Amlodipine 10mg daily   - Continue Metoprolol XL 50mg daily   - scheduled for 12/28/2022 1:30 pm with device check and should keep that appt.     Hypothyroid  - Continue levothyroxine    Urinary Retention  -Continue Bethanechol, Flomax  -FC placed 8/18 per Renal  -Mobilize with PT     Expected Discharge Location and Transportation: rehab/[facility]  Expected Discharge Date: 8/22/22    DVT prophylaxis:  Medical and mechanical DVT prophylaxis orders are  present.     AM-PAC 6 Clicks Score (PT): 11 (08/19/22 0912)     CODE STATUS:   Code Status and Medical Interventions:   Ordered at: 07/28/22 0000     Code Status (Patient has no pulse and is not breathing):    CPR (Attempt to Resuscitate)     Medical Interventions (Patient has pulse or is breathing):    Full Support       Luz Elena Batista, DO  08/19/22

## 2022-08-19 NOTE — THERAPY TREATMENT NOTE
Patient Name: Jermaine Singh  : 1945    MRN: [id number]                              Today's Date: 2022       Admit Date: 2022    Visit Dx:     ICD-10-CM ICD-9-CM   1. Dislocation of left shoulder joint, initial encounter  S43.005A 831.00   2. Cellulitis of left foot  L03.116 682.7   3. 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  Preserved LV function   • Type 2 diabetes mellitus (HCC)   • Hypertension   • Hyperlipidemia   • Hypothyroidism (acquired)   • Vitamin D deficiency on Rx    • Serum Cr 1.32 on admission.  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 Au   • Dizziness   • Atherosclerosis of native artery of both lower extremities with intermittent claudication ([facility])   • SSS (sick sinus syndrome) ([facility])   • Dislocation of left shoulder joint, initial encounter   • Cellulitis in diabetic foot ([facility])   • Fall   • Sepsis ([facility])   • Lactic acidosis   • Proximal phalanx fracture of the second digit extending into the second metatarsal joint   • Acute renal failure (ARF) ([facility])   • Acute systolic heart failure ([facility])   • DVT, bilateral lower limbs ([facility])   • Acute respiratory failure with hypoxia ([facility])     Past Medical History:   Diagnosis Date   • Asthma    • Coronary artery disease    • Diabetes mellitus (HCC)     started on [place] 2018; started on po meds in ; checking blood sugars daily    • Disease of thyroid gland     po meds daily for hypothyroidism    • History of fracture as a child     rt leg- severe    • Hyperlipidemia    • Hypertension    • Hypothyroidism    • Peripheral neuropathy    • Peripheral vascular disease (HCC)     s/p angiogram -needs stent in left leg    • RBBB    • Right knee pain    • Vitamin D deficiency      Past Surgical History:   Procedure Laterality Date   • APPENDECTOMY     • CARDIAC  CATHETERIZATION N/A 2/14/2019    Procedure: Left Heart Cath;  Surgeon: Cooper Apodaca MD;  Location:  [facility]ASIVE [facility]ATION;  Service: Cardiology   • CARDIAC ELECTROPHYSIOLOGY PROCEDURE N/A 5/18/2022    Procedure: DEVICE IMPLANT;  Surgeon: Cooper Apodaca MD;  Location:  [facility]VASIVE LOCATION;  Service: Cardiology;  Laterality: N/A;   • COLONOSCOPY     • CORONARY ARTERY BYPASS GRAFT N/A 3/22/2019    Procedure: MEDIAN STERNOTOMY, CORONARY ARTERY BYPASS GRAFT X3, UTILIZING THE LEFT INTERNAL MAMMARY ARTERY, EVH AND OPEN HARVEST OF THE RIGHT GREATER SAPHENOUS VEIN, EXPLORATION OF THE LEFT LEG;  Surgeon: Ap Au MD;  Location:  [facility] OR;  Service: Cardiothoracic   • EYE SURGERY Bilateral     cataracts    • INTERVENTIONAL RADIOLOGY PROCEDURE N/A 7/29/2021    Procedure: Abdominal Aortagram with Runoff;  Surgeon: Jermaine Hernandez MD;  Location:  [facility];  Service: Cardiovascular;  Laterality: N/A;   • KNEE ARTHROSCOPY      right x 2, left x 1   • LACERATION REPAIR      right leg   • LEG SURGERY      2 for fracture of rt leg    • TONSILLECTOMY      Adnoidectomy   • TRANS METATARSAL AMPUTATION Left 8/2/2022    Procedure: GREAT TOE AMPUTATION LEFT;  Surgeon: Gopal Márquez MD;  Location:  [facility];  Service: Vascular;  Laterality: Left;      General Information     Row Name 08/19/22 1518          OT Time and Intention    Document Type therapy note (daily note)  -JY     Mode of Treatment occupational therapy;individual therapy  -JY     Row Name 08/19/22 1518          General Information    Patient Profile Reviewed yes  -JY     Existing Precautions/Restrictions fall;left;non-weight bearing;other (see comments);oxygen therapy device and L/min  LUE WBAT (no ABDuction or ER), LLE NWB with offloading shoe  -JY     Barriers to Rehab medically complex;previous functional deficit  -JY     Row Name 08/19/22 1628          Cognition    Orientation Status (Cognition) oriented  to;person;place;situation;disoriented to;time  -JY     Row Name 08/19/22 1518          Safety Issues, Functional Mobility    Safety Issues Affecting Function (Mobility) insight into deficits/self-awareness;safety precaution awareness;safety precautions follow-through/compliance;ability to follow commands;judgment  -JY     Impairments Affecting Function (Mobility) endurance/activity tolerance;pain;range of motion (ROM);strength;shortness of breath;cognition  -JY     Cognitive Impairments, Mobility Safety/Performance awareness, need for assistance;insight into deficits/self-awareness;judgment;problem-solving/reasoning;safety precaution awareness;safety precaution follow-through;sequencing abilities  -JY     Comment, Safety Issues/Impairments (Mobility) pt grossly deferred progressive fxl transfers with request to remain reclined in recliner to rest after TE completed, progressive mobility not assessed  -DAKOTA           User Key  (r) = Recorded By, (t) = Taken By, (c) = Cosigned By    Initials Name Provider Type    Guerline Corey OT Occupational Therapist                 Mobility/ADL's     Row Name 08/19/22 1520          Bed Mobility    Bed Mobility other (see comments)  received UIC, largely reclined  -DAKOTA     Comment, (Bed Mobility) pt received UIC largely reclined and preferred to remain OOB thus bed mobility not assessed this date  -DAKOTA     Row Name 08/19/22 1520          Transfers    Comment, (Transfers) pt grossly deferred progressive fxl transfers requesting to remain in recliner to rest following TE thus t/fs not assessed this date  -     Row Name 08/19/22 1520          Functional Mobility    Functional Mobility- Comment defer to PT for specifics  -     Row Name 08/19/22 1520          Activities of Daily Living    BADL Assessment/Intervention other (see comments)  pt deferred g/h and dressing when presented; OT did facilitate allowed TE at LUE for maximum joint moblity to improve integration in ADLs, related  t/fs  -     Row Name 08/19/22 1520          Mobility    Extremity Weight-bearing Status left upper extremity;left lower extremity  -     Left Upper Extremity (Weight-bearing Status) weight-bearing as tolerated (WBAT)  -     Left Lower Extremity (Weight-bearing Status) non weight-bearing (NWB)   -           User Key  (r) = Recorded By, (t) = Taken By, (c) = Cosigned By    Initials Name Provider Type    Guerlien Corey OT Occupational Therapist               Obj/Interventions     [facility] Name 08/19/22 1523          Sensory Assessment (Somatosensory)    Sensory Assessment (Somatosensory) left UE;sensation intact  -     Sensory Assessment denies any numbness or tingling at LUE  -[facility] Name 08/19/22 1523          Shoulder (Therapeutic Exercise)    Shoulder (Therapeutic Exercise) AAROM (active assistive range of motion)  -     Shoulder AAROM (Therapeutic Exercise) left;flexion;extension;other (see comments);10 repetitions  largely reclined (almost supine) in recliner  -JY     Row Name 08/19/22 1523          Elbow/Forearm (Therapeutic Exercise)    Elbow/Forearm (Therapeutic Exercise) AROM (active range of motion)  -     Elbow/Forearm AROM (Therapeutic Exercise) left;flexion;extension;supination;pronation;10 repetitions  largely reclined (almost supine) in recliner  -[facility] Name 08/19/22 1523          Wrist (Therapeutic Exercise)    Wrist (Therapeutic Exercise) AROM (active range of motion)  -     Wrist AROM (Therapeutic Exercise) left;flexion;extension;10 repetitions  -JY     Row Name 08/19/22 1523          Hand (Therapeutic Exercise)    Hand (Therapeutic Exercise) AROM (active range of motion)  -     Hand AROM/AAROM (Therapeutic Exercise) left;AROM (active range of motion);finger flexion;finger extension;10 repetitions  -[facility] Name 08/19/22 1523          Motor Skills    Therapeutic Exercise shoulder;elbow/forearm;wrist;hand;other (see comments)  facilitated LUE AROM as indicated by MD with  distal to proximal approach encompassing hand, wrist, elbow, shoulder with no ABDuction or ER, increase in pain noted with AAROM shoulder flex/ext, otherwise WFL with no increase in pain  -DAKOTA     Row Name 08/19/22 1523          Balance    Comment, Balance pt maintained largely reclined position in recliner (grossly supine) throughout session with request to return to resting upon completion of TE thus progressive positions to assess balance did not occur  -JY           User Key  (r) = Recorded By, (t) = Taken By, (c) = Cosigned By    Initials Name Provider Type    Guerline Corey, OT Occupational Therapist               Goals/Plan    No documentation.                Clinical Impression     Row Name 08/19/22 1527          Pain Assessment    Pretreatment Pain Rating 0/10 - no pain  -JY     Posttreatment Pain Rating 0/10 - no pain  -JY     Pre/Posttreatment Pain Comment denies any pain and tolerated OT interventions  -JY     Pain Intervention(s) Repositioned;Ambulation/increased activity;Rest  -DAKOTA     Row Name 08/19/22 1527          Plan of Care Review    Plan of Care Reviewed With patient  -JODELL     Progress no change  -JY     Outcome Evaluation Pt lethargic upon OT arrival, agreeable to TE at LUE yet deferred progressive fxl transfers and ADL retraining indicating preference to rest in recliner. Pt tolerated LUE TE as indicated by MD with distal to proximal approach including hand, wrist, elbow AROM and AAROM shoulder flexion/ext to ~ 100 degrees prior to increase in pain. Pt this date tolerated increase in ROM likely impacted by position of grossly supine in recliner. No ABDuction or ER completed. ROM completed to facilitate joint mobility and improved integration of UEs in ADLs, related t/fs. Will progress pt while hospitalized with plan to progress ADL retraining and related mobility next session as pt tolerates.  -DAKOTA     Row Name 08/19/22 1527          Vital Signs    Pre Systolic BP Rehab 109  -JY     Pre  Treatment Diastolic BP 89  -JY     Pretreatment Heart Rate (beats/min) 63  -JY     Pre SpO2 (%) 97  -JY     O2 Delivery Pre Treatment supplemental O2  -JY     O2 Delivery Intra Treatment supplemental O2  -JY     O2 Delivery Post Treatment supplemental O2  -JY     Pre Patient Position Sitting  largely reclined in recliner  -JY     Intra Patient Position Sitting  -JY     Post Patient Position Sitting  -JY     Row Name 08/19/22 1527          Positioning and Restraints    Pre-Treatment Position sitting in chair/recliner  -JY     Post Treatment Position chair  -JY     In Chair notified nsg;reclined;call light within reach;encouraged to call for assist;exit alarm on;waffle cushion;on mechanical lift sling;legs elevated  LLE in offloading boot  -JY           User Key  (r) = Recorded By, (t) = Taken By, (c) = Cosigned By    Initials Name Provider Type    Guerline Corey, ANG Occupational Therapist               Outcome Measures     Row Name 08/19/22 1538          How much help from another is currently needed...    Putting on and taking off regular lower body clothing? 1  -JY     Bathing (including washing, rinsing, and drying) 1  -JY     Toileting (which includes using toilet bed pan or urinal) 1  -JY     Putting on and taking off regular upper body clothing 3  -JY     Taking care of personal grooming (such as brushing teeth) 3  -JY     Eating meals 3  -JY     AM-PAC 6 Clicks Score (OT) 12  -JY     Row Name 08/19/22 0912          How much help from another person do you currently need...    Turning from your back to your side while in flat bed without using bedrails? 3  -AS     Moving from lying on back to sitting on the side of a flat bed without bedrails? 3  -AS     Moving to and from a bed to a chair (including a wheelchair)? 1  -AS     Standing up from a chair using your arms (e.g., wheelchair, bedside chair)? 2  -AS     Climbing 3-5 steps with a railing? 1  -AS     To walk in hospital room? 1  -AS     AM-PAC 6 Clicks  Score (PT) 11  -AS     Highest level of mobility 4 --> Transferred to chair/commode  -AS     Row Name 08/19/22 1538 08/19/22 0912       Functional Assessment    Outcome Measure Options AM-PAC 6 Clicks Daily Activity (OT)  -DAKOTA AM-PAC 6 Clicks Basic Mobility (PT)  -AS          User Key  (r) = Recorded By, (t) = Taken By, (c) = Cosigned By    Initials Name Provider Type    AS Macrina Ku PTA Physical Therapist Assistant    Guerline Corey OT Occupational Therapist                Occupational Therapy Education                 Title: PT OT SLP Therapies (In Progress)     Topic: Occupational Therapy (In Progress)     Point: ADL training (Done)     Description:   Instruct learner(s) on proper safety adaptation and remediation techniques during self care or transfers.   Instruct in proper use of assistive devices.              Learning Progress Summary           Patient Acceptance, E, VU by JORDAN at 8/18/2022 2346   Family Acceptance, E, VU,NR by AR at 8/15/2022 0959      Show all documentation for this point (13)                 Point: Home exercise program (In Progress)     Description:   Instruct learner(s) on appropriate technique for monitoring, assisting and/or progressing therapeutic exercises/activities.              Learning Progress Summary           Patient Acceptance, E,D, NR by DAKOTA at 8/19/2022 1501   Family Acceptance, E, VU,NR by AR at 8/15/2022 0959      Show all documentation for this point (14)                 Point: Precautions (In Progress)     Description:   Instruct learner(s) on prescribed precautions during self-care and functional transfers.              Learning Progress Summary           Patient Acceptance, E,D, NR by DAKOTA at 8/19/2022 1501   Family Acceptance, E, VU,NR by AR at 8/15/2022 0959      Show all documentation for this point (14)                 Point: Body mechanics (In Progress)     Description:   Instruct learner(s) on proper positioning and spine alignment during self-care,  functional mobility activities and/or exercises.              Learning Progress Summary           Patient Acceptance, E,D, NR by DAKOTA at 8/19/2022 1501   Family Acceptance, E, VU,NR by AR at 8/15/2022 0959      Show all documentation for this point (10)                             User Key     Initials Effective Dates Name Provider Type Discipline    AR 06/16/21 -  Nathalia Osborne OT Occupational Therapist OT    DAKOTA 06/16/21 -  Guerline Lazcano OT Occupational Therapist OT    JORDAN 06/20/22 -  Fiorella Arzate RN Registered Nurse Nurse              OT Recommendation and Plan  Therapy Frequency (OT): daily  Plan of Care Review  Plan of Care Reviewed With: patient  Progress: no change  Outcome Evaluation: Pt lethargic upon OT arrival, agreeable to TE at LUE yet deferred progressive fxl transfers and ADL retraining indicating preference to rest in recliner. Pt tolerated LUE TE as indicated by MD with distal to proximal approach including hand, wrist, elbow AROM and AAROM shoulder flexion/ext to ~ 100 degrees prior to increase in pain. Pt this date tolerated increase in ROM likely impacted by position of grossly supine in recliner. No ABDuction or ER completed. ROM completed to facilitate joint mobility and improved integration of UEs in ADLs, related t/fs. Will progress pt while hospitalized with plan to progress ADL retraining and related mobility next session as pt tolerates.     Time Calculation:    Time Calculation- OT     Row Name 08/19/22 1539             Time Calculation- OT    OT Start Time 1501  -JY      OT Received On 08/19/22  -JY      OT Goal Re-Cert Due Date 08/25/22  -JY              Timed Charges    74790 - OT Therapeutic Exercise Minutes 14  -JY              Total Minutes    Timed Charges Total Minutes 14  -JY       Total Minutes 14  -JY            User Key  (r) = Recorded By, (t) = Taken By, (c) = Cosigned By    Initials Name Provider Type    Guerline Corey OT Occupational Therapist               Therapy Charges for Today     Code Description Service Date Service Provider Modifiers Qty    [id number] HC OT THER PROC EA 15 MIN 8/19/2022 Guerline Lazcano OT GO 1               Guerline Lazcano OT  8/19/2022

## 2022-08-19 NOTE — PROGRESS NOTES
"  [facility]  PROGRESS NOTE    Date of Admission: 7/27/2022  Date of Service: 08/19/22    Primary Care Physician: Farooq Painter MD    Chief Complaint: Following for HTN, Acute CHF    Subjective    Did well overnight but still not sleeping much.  Diuretics ordered yesterday per nephrology.  He is sitting up in chair, wants to get up more.  His breathing is better and he reports his spirits being better.    Objective   Vitals: /83   Pulse 63   Temp 98.2 °F (36.8 °C) (Oral)   Resp 18   Ht 190.5 cm (75\")   Wt 110 kg (243 lb)   SpO2 91%   BMI 30.37 kg/m²       Physical Exam:  GENERAL: Alert, cooperative, in no acute distress.   HEENT: Normocephalic, no jugular venous distention  HEART: Regular rhythm, normal rate, and no murmurs, gallops, or rubs.   LUNGS: CTA all lung fields with coarse lung sounds, On 2-3L NC. Improved.  ABDOMEN: Soft, bowel sounds present, nontender   NEUROLOGIC: No focal abnormalities involving strength or sensation are noted.   EXTREMITIES: No clubbing, cyanosis.  Trace edema noted.     Results:  Results from last 7 days   Lab Units 08/19/22  0333 08/15/22  0615 08/13/22  2310   WBC 10*3/mm3 11.20* 9.63 14.07*   HEMOGLOBIN g/dL 10.7* 10.6* 10.2*   HEMATOCRIT % 31.9* 32.6* 31.2*   PLATELETS 10*3/mm3 160 199 226     Results from last 7 days   Lab Units 08/19/22  0333 08/18/22  0327 08/17/22  0507   SODIUM mmol/L 133* 135* 137   POTASSIUM mmol/L 4.4 4.1 4.6   CHLORIDE mmol/L 93* 96* 96*   CO2 mmol/L 24.0 26.0 28.0   BUN mg/dL 85* 82* 78*   CREATININE mg/dL 3.23* 3.17* 2.66*   GLUCOSE mg/dL 225* 63* 232*      Lab Results   Component Value Date    CHOL 180 08/04/2022    TRIG 160 (H) 08/04/2022    HDL 39 (L) 08/04/2022     (H) 08/04/2022    AST 12 08/13/2022    ALT 13 08/13/2022         Results from last 7 days   Lab Units 08/13/22  1224   PROTIME Seconds 13.8   INR  1.07   APTT seconds 29.7*     Results from last 7 days   Lab Units " 08/15/22  0615 08/13/22  2310 08/13/22  1224 08/13/22  0730   CK TOTAL U/L 171  --   --   --    TROPONIN T ng/mL  --  0.230* 0.229* 0.230*     Results from last 7 days   Lab Units 08/13/22  2310   PROBNP pg/mL 31,919.0*       Intake/Output Summary (Last 24 hours) at 8/19/2022 0949  Last data filed at 8/19/2022 0942  Gross per 24 hour   Intake 466 ml   Output 1350 ml   Net -884 ml       I personally reviewed the patient's EKG/Telemetry data    Radiology Data:   CXR 8/14/22:  IMPRESSION:     Increased patchy bibasilar opacities     Cardiomegaly and pulmonary vascular congestion.    Current Medications:  amLODIPine, 10 mg, Oral, Daily  amoxicillin-clavulanate, 1 tablet, Oral, Q24H  apixaban, 10 mg, Oral, Q12H   Followed by  [START ON 8/21/2022] apixaban, 5 mg, Oral, Q12H  aspirin, 81 mg, Oral, Daily  bethanechol, 10 mg, Oral, TID  cetirizine, 5 mg, Oral, Daily  insulin detemir, 10 Units, Subcutaneous, Nightly  insulin lispro, 0-9 Units, Subcutaneous, TID AC  levothyroxine, 50 mcg, Oral, Q AM  metoprolol succinate XL, 50 mg, Oral, Q24H  polyethylene glycol, 17 g, Oral, Daily  senna-docusate sodium, 2 tablet, Oral, BID  sodium chloride, 10 mL, Intravenous, Q12H  sodium chloride, 10 mL, Intravenous, Q12H  tamsulosin, 0.4 mg, Oral, Daily  vitamin D3, 5,000 Units, Oral, Daily               ASSESSMENT:  PVDz with remote Soy iliac stenting, good collateral flow.  Osteomyelitis left Great toe  CAD  HTN  [place]  Acute CHF  Hypoxia  DMII  BLE DVTs      PLAN:  -  POD 17 p Left grt toe transmetatarsal amputation.  Continue PT/OT, antibiotics per ID.  Now on Ampicillin-sulbactam.  -  Mildly elevated troponin from demand ischemia with acute CHF. No acute EKG changes.  Continue Beta Blocker, CCB.  Repeat echo, EF likely closer to 40%.  -  Continued rise in Cr. - 3.23 today.  Nephrology following and should manage diuresis.  Renal ultrasound shows no obstruction and NO WILMA by ultrasound.  - Eliquis per DVT protocol.  -  Improvement seen  in respiratory status with diuresis.  Encourage continuation.      Cardiology will see again on Monday.  Please call covering service if needed over the weekend.     Megan Shukla PA-C   10:17 AM EDT  08/19/2022

## 2022-08-20 LAB
ANION GAP SERPL CALCULATED.3IONS-SCNC: 10 MMOL/L (ref 5–15)
BUN SERPL-MCNC: 86 MG/DL (ref 8–23)
BUN/CREAT SERPL: 29 (ref 7–25)
CALCIUM SPEC-SCNC: 8.2 MG/DL (ref 8.6–10.5)
CHLORIDE SERPL-SCNC: 96 MMOL/L (ref 98–107)
CO2 SERPL-SCNC: 30 MMOL/L (ref 22–29)
CREAT SERPL-MCNC: 2.97 MG/DL (ref 0.76–1.27)
DEPRECATED RDW RBC AUTO: 39.5 FL (ref 37–54)
EGFRCR SERPLBLD CKD-EPI 2021: 21 ML/MIN/1.73
ERYTHROCYTE [DISTWIDTH] IN BLOOD BY AUTOMATED COUNT: 12.5 % (ref 12.3–15.4)
GLUCOSE BLDC GLUCOMTR-MCNC: 110 MG/DL (ref 70–130)
GLUCOSE BLDC GLUCOMTR-MCNC: 146 MG/DL (ref 70–130)
GLUCOSE BLDC GLUCOMTR-MCNC: 150 MG/DL (ref 70–130)
GLUCOSE BLDC GLUCOMTR-MCNC: 160 MG/DL (ref 70–130)
GLUCOSE BLDC GLUCOMTR-MCNC: 194 MG/DL (ref 70–130)
GLUCOSE BLDC GLUCOMTR-MCNC: 333 MG/DL (ref 70–130)
GLUCOSE SERPL-MCNC: 139 MG/DL (ref 65–99)
HCT VFR BLD AUTO: 28.8 % (ref 37.5–51)
HGB BLD-MCNC: 9.8 G/DL (ref 13–17.7)
MCH RBC QN AUTO: 29.3 PG (ref 26.6–33)
MCHC RBC AUTO-ENTMCNC: 34 G/DL (ref 31.5–35.7)
MCV RBC AUTO: 86.2 FL (ref 79–97)
PLATELET # BLD AUTO: 157 10*3/MM3 (ref 140–450)
PMV BLD AUTO: 13.1 FL (ref 6–12)
POTASSIUM SERPL-SCNC: 4.4 MMOL/L (ref 3.5–5.2)
RBC # BLD AUTO: 3.34 10*6/MM3 (ref 4.14–5.8)
SODIUM SERPL-SCNC: 136 MMOL/L (ref 136–145)
WBC NRBC COR # BLD: 9.07 10*3/MM3 (ref 3.4–10.8)

## 2022-08-20 PROCEDURE — 97530 THERAPEUTIC ACTIVITIES: CPT

## 2022-08-20 PROCEDURE — 85027 COMPLETE CBC AUTOMATED: CPT | Performed by: FAMILY MEDICINE

## 2022-08-20 PROCEDURE — 99232 SBSQ HOSP IP/OBS MODERATE 35: CPT | Performed by: FAMILY MEDICINE

## 2022-08-20 PROCEDURE — 63710000001 INSULIN LISPRO (HUMAN) PER 5 UNITS: Performed by: FAMILY MEDICINE

## 2022-08-20 PROCEDURE — 94664 DEMO&/EVAL PT USE INHALER: CPT

## 2022-08-20 PROCEDURE — 80048 BASIC METABOLIC PNL TOTAL CA: CPT | Performed by: FAMILY MEDICINE

## 2022-08-20 PROCEDURE — 97535 SELF CARE MNGMENT TRAINING: CPT

## 2022-08-20 PROCEDURE — 94799 UNLISTED PULMONARY SVC/PX: CPT

## 2022-08-20 PROCEDURE — 99024 POSTOP FOLLOW-UP VISIT: CPT | Performed by: PHYSICIAN ASSISTANT

## 2022-08-20 PROCEDURE — 82962 GLUCOSE BLOOD TEST: CPT

## 2022-08-20 PROCEDURE — 63710000001 INSULIN LISPRO (HUMAN) PER 5 UNITS: Performed by: INTERNAL MEDICINE

## 2022-08-20 PROCEDURE — 63710000001 INSULIN DETEMIR PER 5 UNITS: Performed by: FAMILY MEDICINE

## 2022-08-20 RX ADMIN — BETHANECHOL CHLORIDE 10 MG: 10 TABLET ORAL at 21:55

## 2022-08-20 RX ADMIN — ACETAMINOPHEN 650 MG: 650 SOLUTION ORAL at 21:55

## 2022-08-20 RX ADMIN — CETIRIZINE HYDROCHLORIDE 5 MG: 10 TABLET, FILM COATED ORAL at 08:46

## 2022-08-20 RX ADMIN — BUMETANIDE 2 MG: 0.25 INJECTION INTRAMUSCULAR; INTRAVENOUS at 08:45

## 2022-08-20 RX ADMIN — AMOXICILLIN AND CLAVULANATE POTASSIUM 500 MG: 500; 125 TABLET, FILM COATED ORAL at 21:56

## 2022-08-20 RX ADMIN — INSULIN LISPRO 5 UNITS: 100 INJECTION, SOLUTION INTRAVENOUS; SUBCUTANEOUS at 08:47

## 2022-08-20 RX ADMIN — TAMSULOSIN HYDROCHLORIDE 0.4 MG: 0.4 CAPSULE ORAL at 08:46

## 2022-08-20 RX ADMIN — INSULIN LISPRO 5 UNITS: 100 INJECTION, SOLUTION INTRAVENOUS; SUBCUTANEOUS at 17:31

## 2022-08-20 RX ADMIN — IPRATROPIUM BROMIDE AND ALBUTEROL SULFATE 3 ML: .5; 3 SOLUTION RESPIRATORY (INHALATION) at 23:37

## 2022-08-20 RX ADMIN — IPRATROPIUM BROMIDE AND ALBUTEROL SULFATE 3 ML: .5; 3 SOLUTION RESPIRATORY (INHALATION) at 14:05

## 2022-08-20 RX ADMIN — BETHANECHOL CHLORIDE 10 MG: 10 TABLET ORAL at 08:45

## 2022-08-20 RX ADMIN — SENNOSIDES AND DOCUSATE SODIUM 2 TABLET: 50; 8.6 TABLET ORAL at 08:47

## 2022-08-20 RX ADMIN — Medication 5000 UNITS: at 08:46

## 2022-08-20 RX ADMIN — APIXABAN 10 MG: 5 TABLET, FILM COATED ORAL at 08:45

## 2022-08-20 RX ADMIN — INSULIN LISPRO 2 UNITS: 100 INJECTION, SOLUTION INTRAVENOUS; SUBCUTANEOUS at 17:31

## 2022-08-20 RX ADMIN — Medication 10 ML: at 20:20

## 2022-08-20 RX ADMIN — POLYETHYLENE GLYCOL 3350 17 G: 17 POWDER, FOR SOLUTION ORAL at 08:47

## 2022-08-20 RX ADMIN — INSULIN LISPRO 2 UNITS: 100 INJECTION, SOLUTION INTRAVENOUS; SUBCUTANEOUS at 12:09

## 2022-08-20 RX ADMIN — BETHANECHOL CHLORIDE 10 MG: 10 TABLET ORAL at 15:33

## 2022-08-20 RX ADMIN — Medication 10 ML: at 20:30

## 2022-08-20 RX ADMIN — LEVOTHYROXINE SODIUM 50 MCG: 50 TABLET ORAL at 05:30

## 2022-08-20 RX ADMIN — APIXABAN 10 MG: 5 TABLET, FILM COATED ORAL at 21:56

## 2022-08-20 RX ADMIN — METOPROLOL SUCCINATE 50 MG: 50 TABLET, EXTENDED RELEASE ORAL at 08:46

## 2022-08-20 RX ADMIN — INSULIN LISPRO 5 UNITS: 100 INJECTION, SOLUTION INTRAVENOUS; SUBCUTANEOUS at 12:09

## 2022-08-20 RX ADMIN — AMOXICILLIN AND CLAVULANATE POTASSIUM 500 MG: 500; 125 TABLET, FILM COATED ORAL at 08:44

## 2022-08-20 RX ADMIN — IPRATROPIUM BROMIDE AND ALBUTEROL SULFATE 3 ML: .5; 3 SOLUTION RESPIRATORY (INHALATION) at 03:58

## 2022-08-20 RX ADMIN — ASPIRIN 81 MG CHEWABLE TABLET 81 MG: 81 TABLET CHEWABLE at 08:45

## 2022-08-20 RX ADMIN — INSULIN DETEMIR 15 UNITS: 100 INJECTION, SOLUTION SUBCUTANEOUS at 00:58

## 2022-08-20 NOTE — PROGRESS NOTES
[facility]  PROGRESS NOTE    Patient Name: Jermaine Singh  : 1945  MRN: [id number]    Date of Admission: 2022  Primary Care Physician: Farooq Paniter MD    Subjective   Subjective     CC:  F/U osteomyelitis    HPI:  Patient seen and examined. Daughter at bedside. States he feels better today. Cr slightly improved. No new complaints today.    ROS:  Gen- No fevers, chills  CV- No chest pain, palpitations  Resp- No cough, dyspnea  GI- No N/V/D, abd pain    Objective   Objective     Vital Signs:   Temp:  [97.7 °F (36.5 °C)-98.2 °F (36.8 °C)] 98.2 °F (36.8 °C)  Heart Rate:  [59-87] 87  Resp:  [17-19] 18  BP: ()/(55-89) 101/79  Flow (L/min):  [3-4] 4     Physical Exam:  Constitutional: No acute distress, awake, alert, lying in bed   HENT: NCAT, mucous membranes moist  Respiratory: Decreased in bases/Occ Rales, respiratory effort normal 3L NC (on my exam)   Cardiovascular: RRR, no murmurs, rubs, or gallops  Gastrointestinal: Positive bowel sounds, soft, nontender, nondistended  Musculoskeletal: No bilateral ankle edema, PICC has been removed RUE., S/P L great toe amputation healing well, sutures in place   Psychiatric: Appropriate affect, cooperative  Neurologic: Oriented x 3, speech clear, ALBARADO  Skin: No rashes    Results Reviewed:  LAB RESULTS:      Lab 22  0639 22  0333 08/15/22  0615 22  1132 22  0116 22  2310 22  1818 22  1224   WBC 9.07 11.20* 9.63  --   --  14.07*  --  14.42*   HEMOGLOBIN 9.8* 10.7* 10.6*  --   --  10.2*  --  10.5*   HEMATOCRIT 28.8* 31.9* 32.6*  --   --  31.2*  --  31.4*   PLATELETS 157 160 199  --   --  226  --  209   NEUTROS ABS  --   --   --   --   --  11.61*  --  12.08*   IMMATURE GRANS (ABS)  --   --   --   --   --  0.06*  --  0.05   LYMPHS ABS  --   --   --   --   --  1.13  --  1.06   MONOS ABS  --   --   --   --   --  1.17*  --  1.17*   EOS ABS  --   --   --   --   --  0.04  --  0.01    MCV 86.2 87.2 90.1  --   --  89.4  --  88.2   PROTIME  --   --   --   --   --   --   --  13.8   APTT  --   --   --   --   --   --   --  29.7*   HEPARIN ANTI-XA  --   --   --  0.10* 0.49  --  0.37 0.10*         Lab 08/20/22  0639 08/19/22  0333 08/18/22  0327 08/17/22  0507 08/15/22  0615 08/13/22  2310   SODIUM 136 133* 135* 137 138 139   POTASSIUM 4.4 4.4 4.1 4.6 4.9 4.0   CHLORIDE 96* 93* 96* 96* 97* 99   CO2 30.0* 24.0 26.0 28.0 27.0 27.0   ANION GAP 10.0 16.0* 13.0 13.0 14.0 13.0   BUN 86* 85* 82* 78* 64* 51*   CREATININE 2.97* 3.23* 3.17* 2.66* 2.16* 2.07*   EGFR 21.0* 19.0* 19.4* 24.0* 30.8* 32.4*   GLUCOSE 139* 225* 63* 232* 225* 151*   CALCIUM 8.2* 8.1* 8.3* 8.2* 8.8 8.5*   MAGNESIUM  --   --   --   --   --  2.7*   PHOSPHORUS  --   --   --   --  5.5*  --          Lab 08/15/22  0615 08/13/22 2310   TOTAL PROTEIN  --  6.1   ALBUMIN 3.60 2.90*   GLOBULIN  --  3.2   ALT (SGPT)  --  13   AST (SGOT)  --  12   BILIRUBIN  --  0.3   ALK PHOS  --  60         Lab 08/13/22  2310 08/13/22  1224   PROBNP 31,919.0*  --    TROPONIN T 0.230* 0.229*   PROTIME  --  13.8   INR  --  1.07                 Brief Urine Lab Results  (Last result in the past 365 days)      Color   Clarity   Blood   Leuk Est   Nitrite   Protein   CREAT   Urine HCG        08/14/22 1818             149.9               Microbiology Results Abnormal     Procedure Component Value - Date/Time    Eosinophil Smear - Urine, Urine, Clean Catch [[id number]]  (Normal) Collected: 08/14/22 1818    Lab Status: Final result Specimen: Urine, Clean Catch Updated: 08/14/22 2101     Eosinophil Smear 0 % EOS/100 Cells     Narrative:      No eosinophil seen    Anaerobic Culture - Tissue, Toe, Left [[id number]] Collected: 08/02/22 1027    Lab Status: Final result Specimen: Tissue from Toe, Left Updated: 08/07/22 0858     Anaerobic Culture No anaerobes isolated at 5 days    Blood Culture - Blood, Hand, Left [[id number]]  (Normal) Collected: 07/27/22 2040    Lab Status: Final  result Specimen: Blood from Hand, Left Updated: 08/01/22 2132     Blood Culture No growth at 5 days    Blood Culture - Blood, Hand, Right [[id number]]  (Normal) Collected: 07/27/22 2030    Lab Status: Final result Specimen: Blood from Hand, Right Updated: 08/01/22 2132     Blood Culture No growth at 5 days    COVID PRE-OP / PRE-PROCEDURE SCREENING ORDER (NO ISOLATION) - Swab, Nasopharynx [[id number]]  (Normal) Collected: 07/27/22 2246    Lab Status: Final result Specimen: Swab from Nasopharynx Updated: 07/27/22 2357    Narrative:      The following orders were created for panel order COVID PRE-OP / PRE-PROCEDURE SCREENING ORDER (NO ISOLATION) - Swab, Nasopharynx.  Procedure                               Abnormality         Status                     ---------                               -----------         ------                     COVID-19 and FLU A/B PCR...[[id number]]  Normal              Final result                 Please view results for these tests on the individual orders.    COVID-19 and FLU A/B PCR - Swab, Nasopharynx [[id number]]  (Normal) Collected: 07/27/22 2246    Lab Status: Final result Specimen: Swab from Nasopharynx Updated: 07/27/22 2357     COVID19 Not Detected     Influenza A PCR Not Detected     Influenza B PCR Not Detected    Narrative:      Fact sheet for providers: https://www.fda.gov/media/585014/download    Fact sheet for patients: https://www.fda.gov/media/529928/download    Test performed by PCR.          No radiology results from the last 24 hrs    Results for orders placed during the hospital encounter of 07/27/22    Adult Transthoracic Echo Complete W/ Cont if Necessary Per Protocol    Interpretation Summary  · Left ventricular ejection fraction appears to be 46 - 50%. Left ventricular systolic function is low normal.  · Left ventricular septal hypokinesis  · No significant valvular heart disease      I have reviewed the medications:  Scheduled Meds:amLODIPine, 10 mg, Oral,  Daily  amoxicillin-clavulanate, 1 tablet, Oral, Q12H  apixaban, 10 mg, Oral, Q12H   Followed by  [START ON 8/21/2022] apixaban, 5 mg, Oral, Q12H  aspirin, 81 mg, Oral, Daily  bethanechol, 10 mg, Oral, TID  bumetanide, 2 mg, Intravenous, Daily  cetirizine, 5 mg, Oral, Daily  insulin detemir, 15 Units, Subcutaneous, Nightly  insulin lispro, 0-9 Units, Subcutaneous, TID AC  Insulin Lispro, 5 Units, Subcutaneous, TID With Meals  levothyroxine, 50 mcg, Oral, Q AM  metoprolol succinate XL, 50 mg, Oral, Q24H  polyethylene glycol, 17 g, Oral, Daily  senna-docusate sodium, 2 tablet, Oral, BID  sodium chloride, 10 mL, Intravenous, Q12H  sodium chloride, 10 mL, Intravenous, Q12H  tamsulosin, 0.4 mg, Oral, Daily  vitamin D3, 5,000 Units, Oral, Daily      Continuous Infusions:   PRN Meds:.•  acetaminophen **OR** acetaminophen **OR** acetaminophen  •  benzocaine-menthol  •  bisacodyl  •  calcium carbonate  •  dextrose  •  dextrose  •  glucagon (human recombinant)  •  HYDROcodone-acetaminophen  •  ipratropium-albuterol  •  nitroglycerin  •  ondansetron **OR** ondansetron  •  phenol  •  prochlorperazine  •  sennosides-docusate  •  simethicone  •  [COMPLETED] Insert peripheral IV **AND** sodium chloride  •  sodium chloride  •  sodium chloride    Assessment & Plan   Assessment & Plan     Active Hospital Problems    Diagnosis  POA   • **Sepsis ([facility]) [A41.9]  Yes   • Acute renal failure (ARF) ([facility]) [N17.9]  Yes   • Acute systolic heart failure ([facility]) [I50.21]  No   • DVT, bilateral lower limbs ([facility]) [I82.403]  No   • Acute respiratory failure with hypoxia ([facility]) [J96.01]  No   • Proximal phalanx fracture of the second digit extending into the second metatarsal joint [S92.919A]  Yes   • Dislocation of left shoulder joint, initial encounter [S43.005A]  Yes   • Cellulitis in diabetic foot ([facility]) [E11.628, L03.119]  Yes   • Fall [W19.XXXA]  Yes   • Lactic acidosis [E87.2]  Yes   • SSS (sick sinus syndrome) (HCC) [I49.5]  Yes   •  Hyperlipidemia [E78.5]  Yes   • Hypertension [I10]  Yes   • S/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 Au [Z95.1]  Not Applicable   • Hypothyroidism (acquired) [E03.9]  Yes   • Type 2 diabetes mellitus (HCC) [E11.9]  Yes      Resolved Hospital Problems   No resolved problems to display.        Brief Hospital Course to date:  Jermaine Singh is a 77 y.o. male with PMH significant for HTN, HLD, CAD s/p CABG, PVD s/p RLE stent, CKD III, insulin-dependent DMII and hypothyroidism. He was admitted to [facility] 7/27/22 for L shoulder dislocation after a fall as well as sepsis secondary to L great toe osteomyelitis.     This patient's problems and plans were partially entered by my partner and updated as appropriate by me 08/20/22.     L great toe cellulitis / osteomyelitis  Peripheral vascular disease   - Followed by Dr. Joseph of podiatry - was on PO Doxycycline outpatient  - Bone scan was concerning for osteomyelitis of L great toe, now s/p L great toe amputation by Dr. Márquez 8/2/22   - arterial duplex obtained. Cardiology did not recommend revascularization.  - tissue culture growing enterococcus faecalis and enterococcus casseliflavus  - ID following - Unasyn DC'd 8/19, now on Augmentin BID.   - PICC originally placed 8/10, pulled partially out by patient 8/18 and Charge RN removed later that morning. Will not replace at this time since may need HD and ABX PO .    Acute Hypoxic Respiratory Failure  Acute Systolic CHF  Elevated troponin  -Has some mild acute systolic heart failure with EF of 46 to 50%, though cardiology note says more like 40% based on relook.  -VQ scan was read as low probability of PE  -Minimal troponin elevation peaked at 0.230, cardiology following and feels it is related to demand ischemia  -Remains on Bumex 2mg IV daily per Renal      Acute renal failure  - Baseline creatinine appears to be 0.9-1.2  - Creatinine slightly improved today   - renal US normal, artery duplex normal  -  appreciate renal assistance  - If worsens, may be at risk for dialysis, patient aware     Bilateral LE DVT's  -Initially placed on heparin drip which has now been transitioned to Eliquis  -VQ scan low probability     Fall   Anterior L shoulder dislocation  - s/p reduction in the ED  - Appreciate ortho (Dr. Gipson) assistance   - Recommend non-operative treatment. Sling for comfort.   - PT for range of motion exercises, avoid Abduction and external rotation. WBAT on LUE with walker or knee scooter  - Follow up with Dr. Gipson in 2-3 weeks   - stable today    Constipation  Nausea/Vomiting  -Continue bowel regimen     Insulin-dependent DMII  - Hgb A1c 9.0%  - Continue Levemir 15 units nightly  - Continue 5 units Humalog TID meals   - Continue Mod Dose SSI     Hypertension  Coronary artery disease s/p CABG  SSS s/p PPM   - Cardiology has stopped ACEI and added Amlodipine 10mg daily   - Continue Metoprolol XL 50mg daily   - scheduled for 12/28/2022 1:30 pm with device check and should keep that appt.     Hypothyroid  - Continue levothyroxine    Urinary Retention  -Continue Bethanechol, Flomax  -FC placed 8/18 per Renal  -Mobilize with PT     Expected Discharge Location and Transportation: rehab/[facility]  Expected Discharge Date: 8/22/22    DVT prophylaxis:  Medical and mechanical DVT prophylaxis orders are present.     AM-PAC 6 Clicks Score (PT): 11 (08/19/22 0912)     CODE STATUS:   Code Status and Medical Interventions:   Ordered at: 07/28/22 0000     Code Status (Patient has no pulse and is not breathing):    CPR (Attempt to Resuscitate)     Medical Interventions (Patient has pulse or is breathing):    Full Support       Luz Elena Batista,   08/20/22

## 2022-08-20 NOTE — PLAN OF CARE
Goal Outcome Evaluation:         Patient AAOX4. Blood pressure read under better parameters, being monitored for hypotension. Cardiology advised to watch for MAP maintained <65 for possible albumin administration. Fonseca output overnight has been cherry colored and minimal, irrigated as directed. BGL monitored. Tele reads paced rhythm. Will continue to monitor for remainder of shift.      Thank you,      LS

## 2022-08-20 NOTE — THERAPY TREATMENT NOTE
Patient Name: Jermaine Singh  : 1945    MRN: [id number]                              Today's Date: 2022       Admit Date: 2022    Visit Dx:     ICD-10-CM ICD-9-CM   1. Dislocation of left shoulder joint, initial encounter  S43.005A 831.00   2. Cellulitis of left foot  L03.116 682.7   3. 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  Preserved LV function   • Type 2 diabetes mellitus (HCC)   • Hypertension   • Hyperlipidemia   • Hypothyroidism (acquired)   • Vitamin D deficiency on Rx    • Serum Cr 1.32 on admission.  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 Au   • Dizziness   • Atherosclerosis of native artery of both lower extremities with intermittent claudication ([facility])   • SSS (sick sinus syndrome) ([facility])   • Dislocation of left shoulder joint, initial encounter   • Cellulitis in diabetic foot ([facility])   • Fall   • Sepsis ([facility])   • Lactic acidosis   • Proximal phalanx fracture of the second digit extending into the second metatarsal joint   • Acute renal failure (ARF) ([facility])   • Acute systolic heart failure ([facility])   • DVT, bilateral lower limbs ([facility])   • Acute respiratory failure with hypoxia ([facility])     Past Medical History:   Diagnosis Date   • Asthma    • Coronary artery disease    • Diabetes mellitus (HCC)     started on inuslin 2018; started on po meds in ; checking blood sugars daily    • Disease of thyroid gland     po meds daily for hypothyroidism    • History of fracture as a child     rt leg- severe    • Hyperlipidemia    • Hypertension    • Hypothyroidism    • Peripheral neuropathy    • Peripheral vascular disease (HCC)     s/p angiogram -needs stent in left leg    • RBBB    • Right knee pain    • Vitamin D deficiency      Past Surgical History:   Procedure Laterality Date   • APPENDECTOMY     • CARDIAC  CATHETERIZATION N/A 2/14/2019    Procedure: Left Heart Cath;  Surgeon: Cooper Apodaca MD;  Location:  [facility]ATH [facility]ASIVE [facility]ATION;  Service: Cardiology   • CARDIAC ELECTROPHYSIOLOGY PROCEDURE N/A 5/18/2022    Procedure: DEVICE IMPLANT;  Surgeon: Cooper Apdoaca MD;  Location:  [facility] INVASIVE LOCATION;  Service: Cardiology;  Laterality: N/A;   • COLONOSCOPY     • CORONARY ARTERY BYPASS GRAFT N/A 3/22/2019    Procedure: MEDIAN STERNOTOMY, CORONARY ARTERY BYPASS GRAFT X3, UTILIZING THE LEFT INTERNAL MAMMARY ARTERY, EVH AND OPEN HARVEST OF THE RIGHT GREATER SAPHENOUS VEIN, EXPLORATION OF THE LEFT LEG;  Surgeon: Ap Au MD;  Location:  [facility] OR;  Service: Cardiothoracic   • EYE SURGERY Bilateral     cataracts    • INTERVENTIONAL RADIOLOGY PROCEDURE N/A 7/29/2021    Procedure: Abdominal Aortagram with Runoff;  Surgeon: Jermaine Hernandez MD;  Location:  [facility] CATH INVASIVE LOCATION;  Service: Cardiovascular;  Laterality: N/A;   • KNEE ARTHROSCOPY      right x 2, left x 1   • LACERATION REPAIR      right leg   • LEG SURGERY      2 for fracture of rt leg    • TONSILLECTOMY      Adnoidectomy   • TRANS METATARSAL AMPUTATION Left 8/2/2022    Procedure: GREAT TOE AMPUTATION LEFT;  Surgeon: Gopal Márquez MD;  Location:  [facility];  Service: Vascular;  Laterality: Left;      General Information     Row Name 08/20/22 1522          Physical Therapy Time and Intention    Document Type therapy note (daily note)  -     Mode of Treatment physical therapy  -     Row Name 08/20/22 1522          General Information    Patient Profile Reviewed yes  -     Existing Precautions/Restrictions fall;left;non-weight bearing;other (see comments);oxygen therapy device and L/min  LUE WBAT (no ABDuction or ER), LLE NWB with offloading shoe  -     Row Name 08/20/22 1522          Cognition    Orientation Status (Cognition) oriented to;person;place;situation;disoriented to;time  -     Row Name 08/20/22 1522           Safety Issues, Functional Mobility    Impairments Affecting Function (Mobility) endurance/activity tolerance;pain;range of motion (ROM);strength;shortness of breath;cognition  -           User Key  (r) = Recorded By, (t) = Taken By, (c) = Cosigned By    Initials Name Provider Type    Thelma Kramer PT Physical Therapist               Mobility     Row Name 08/20/22 1522          Transfers    Comment, (Transfers) Dep lift to chair. Further mobility deferred due to drop in BP (84/56) and reports of dizziness  -     Row Name 08/20/22 1522          Bed-Chair Transfer    Bed-Chair [place] (Transfers) dependent (less than 25% patient effort);2 person assist  -     Assistive Device (Bed-Chair Transfers) lift device  -     Row Name 08/20/22 1522          Mobility    Extremity Weight-bearing Status left upper extremity;left lower extremity  -     Left Upper Extremity (Weight-bearing Status) weight-bearing as tolerated (WBAT)  -     Left Lower Extremity (Weight-bearing Status) non weight-bearing (NWB)   -           User Key  (r) = Recorded By, (t) = Taken By, (c) = Cosigned By    Initials Name Provider Type    Thelma Kramer PT Physical Therapist               Obj/Interventions     Row Name 08/20/22 1524          Balance    Balance Assessment sitting static balance;sitting dynamic balance  -     Static Sitting Balance standby assist  -     Dynamic Sitting Balance standby assist  -     Position, Sitting Balance supported;sitting in chair  -           User Key  (r) = Recorded By, (t) = Taken By, (c) = Cosigned By    Initials Name Provider Type    Thelma Kramer PT Physical Therapist               Goals/Plan    No documentation.                Clinical Impression     Row Name 08/20/22 1525          Pain    Pretreatment Pain Rating 0/10 - no pain  -     Posttreatment Pain Rating 0/10 - no pain  -     Row Name 08/20/22 1525          Plan of Care Review    Plan of Care Reviewed With  patient  -HP     Progress no change  -HP     Outcome Evaluation Dep lift to chair. Pt unable to tolerate further mobility due to significant drop in BP to 84/56 and reports of dizziness. Pt denied pain. Continue with PT POC as pt tolerates. Recommend d/c to SNF when medically appropriate.  -HP     Row Name 08/20/22 1525          Vital Signs    Pre Systolic BP Rehab 109  -HP     Pre Treatment Diastolic BP 89  -HP     Intra Systolic BP Rehab 84  -HP     Intra Treatment Diastolic BP 56  -HP     Post Systolic BP Rehab 90  -HP     Post Treatment Diastolic BP 59  -HP     Pre Patient Position Supine  -HP     Intra Patient Position Sitting  -HP     Post Patient Position Sitting  -HP     Row Name 08/20/22 1525          Positioning and Restraints    Pre-Treatment Position in bed  -HP     Post Treatment Position chair  -HP     In Chair notified nsg;reclined;sitting;call light within reach;encouraged to call for assist;exit alarm on;on mechanical lift sling  -HP           User Key  (r) = Recorded By, (t) = Taken By, (c) = Cosigned By    Initials Name Provider Type    HP Thelma Rich, PT Physical Therapist               Outcome Measures     Row Name 08/20/22 1531          How much help from another person do you currently need...    Turning from your back to your side while in flat bed without using bedrails? 3  -HP     Moving from lying on back to sitting on the side of a flat bed without bedrails? 2  -HP     Moving to and from a bed to a chair (including a wheelchair)? 1  -HP     Standing up from a chair using your arms (e.g., wheelchair, bedside chair)? 1  -HP     Climbing 3-5 steps with a railing? 1  -HP     To walk in hospital room? 1  -HP     AM-PAC 6 Clicks Score (PT) 9  -HP     Highest level of mobility 3 --> Sat at edge of bed  -HP     Row Name 08/20/22 1531          Functional Assessment    Outcome Measure Options AM-PAC 6 Clicks Basic Mobility (PT)  -HP           User Key  (r) = Recorded By, (t) = Taken By, (c) =  Cosigned By    Initials Name Provider Type     Thelma Rich PT Physical Therapist                             Physical Therapy Education                 Title: PT OT SLP Therapies (Done)     Topic: Physical Therapy (Done)     Point: Mobility training (Done)     Learning Progress Summary           Patient Acceptance, E,D, NR,VU by  at 8/20/2022 1531   Family Acceptance, E,D, VU,NR by  at 8/17/2022 1042      Show all documentation for this point (14)                 Point: Home exercise program (Done)     Learning Progress Summary           Patient Acceptance, E,D, NR,VU by  at 8/20/2022 1531   Family Acceptance, E,D, VU,NR by HP at 8/17/2022 1042      Show all documentation for this point (14)                 Point: Body mechanics (Done)     Learning Progress Summary           Patient Acceptance, E,D, NR,VU by  at 8/20/2022 1531   Family Acceptance, E,D, VU,NR by  at 8/17/2022 1042      Show all documentation for this point (14)                 Point: Precautions (Done)     Learning Progress Summary           Patient Acceptance, E,D, NR,VU by  at 8/20/2022 1531   Family Acceptance, E,D, VU,NR by  at 8/17/2022 1042      Show all documentation for this point (14)                             User Key     Initials Effective Dates Name Provider Type Discipline     06/01/21 -  Thelma Rich PT Physical Therapist PT              PT Recommendation and Plan  Planned Therapy Interventions (PT): balance training, bed mobility training, gait training, home exercise program, patient/family education, stretching, strengthening, stair training, transfer training, wheelchair management/propulsion training  Plan of Care Reviewed With: patient  Progress: no change  Outcome Evaluation: Dep lift to chair. Pt unable to tolerate further mobility due to significant drop in BP to 84/56 and reports of dizziness. Pt denied pain. Continue with PT POC as pt tolerates. Recommend d/c to SNF when medically appropriate.      Time Calculation:    PT Charges     Row Name 08/20/22 1431             Time Calculation    Start Time 1431  -HP      PT Received On 08/20/22  -HP              Timed Charges    28877 - PT Therapeutic Activity Minutes 15  -HP              Total Minutes    Timed Charges Total Minutes 15  -HP       Total Minutes 15  -HP            User Key  (r) = Recorded By, (t) = Taken By, (c) = Cosigned By    Initials Name Provider Type    HP Thelma Rich, SETH Physical Therapist              Therapy Charges for Today     Code Description Service Date Service Provider Modifiers Qty    [id number]  PT THERAPEUTIC ACT EA 15 MIN 8/20/2022 Thelma Rich, PT GP 1          PT G-Codes  Outcome Measure Options: AM-PAC 6 Clicks Basic Mobility (PT)  AM-PAC 6 Clicks Score (PT): 9  AM-PAC 6 Clicks Score (OT): 12    Thelma Rich PT  8/20/2022

## 2022-08-20 NOTE — PROGRESS NOTES
"   LOS: 24 days    Patient Care Team:  Farooq Painter MD as PCP - General (Family Medicine)  Jermaine Hernandez MD as Consulting Physician (Cardiology)    Chief Complaint:   77-year-old male  left foot to infection has been treated with antibiotic.  Initial creatinine has been within acceptable range, patient received IV vancomycin on starting 7/27/2022, was continued until 8/4/2022 at that time the creatinine started to go up to 2.02 patient's antibiotic was changed, taken off the vancomycin at that time creatinine started to get improved 1.8 slowly over the last 3 days creatinine has been slowly creeping up.  Patient has a past medical history of CAD, CABG, hyperlipidemia, diabetes type 2, hypertension, hypothyroidism, peripheral vascular disease, sick sinus syndrome with pacemaker placement. creatinine 2.07 BUN of 51, albumin 2.9, white count 14.2, platelets 209 normal. Patient is on ampicillin IV, and IV Solu-Medrol 60 mg.  Subjective     Interval History:   UOP ~ 1.5 liter. Slight improvement in renal function.  Resp slight better today     Review of Systems:   Complain generalized weakness other than that all other systems are negative.    Objective     Vital Sign Min/Max for last 24 hours  Temp  Min: 97.7 °F (36.5 °C)  Max: 98.2 °F (36.8 °C)   BP  Min: 93/66  Max: 108/89   Pulse  Min: 59  Max: 87   Resp  Min: 17  Max: 19   SpO2  Min: 91 %  Max: 97 %   Flow (L/min)  Min: 3  Max: 4   Weight  Min: 110 kg (242 lb)  Max: 110 kg (242 lb)     Flowsheet Rows    Flowsheet Row First Filed Value   Admission Height 190.5 cm (75\") Documented at 07/27/2022 1848   Admission Weight 98 kg (216 lb) Documented at 07/27/2022 1848          I/O this shift:  In: 220 [P.O.:220]  Out: 250 [Urine:250]  I/O last 3 completed shifts:  In: 961 [P.O.:961]  Out: 2300 [Urine:2300]    Physical Exam:  General Appearance: Awake alert oriented  male sitting comfortably in chair.  Eyes: PER, EOMI.  Neck: Supple no JVD.  Lungs: Clear " auscultation, no rales rhonchi's, equal chest movement, nonlabored.  Heart: No gallop, murmur, rub, RRR.  Abdomen: Soft, nontender, positive bowel sounds, no organomegaly.  Extremities: No edema, no cyanosis.  Left toe in bandage  Neuro: No focal deficit, moving all extremities, alert oriented X 3   no suprapubic fullness or tenderness  WBC WBC   Date Value Ref Range Status   08/20/2022 9.07 3.40 - 10.80 10*3/mm3 Final   08/19/2022 11.20 (H) 3.40 - 10.80 10*3/mm3 Final      HGB Hemoglobin   Date Value Ref Range Status   08/20/2022 9.8 (L) 13.0 - 17.7 g/dL Final   08/19/2022 10.7 (L) 13.0 - 17.7 g/dL Final      HCT Hematocrit   Date Value Ref Range Status   08/20/2022 28.8 (L) 37.5 - 51.0 % Final   08/19/2022 31.9 (L) 37.5 - 51.0 % Final      Platlets No results found for: LABPLAT   MCV MCV   Date Value Ref Range Status   08/20/2022 86.2 79.0 - 97.0 fL Final   08/19/2022 87.2 79.0 - 97.0 fL Final          Sodium Sodium   Date Value Ref Range Status   08/20/2022 136 136 - 145 mmol/L Final   08/19/2022 133 (L) 136 - 145 mmol/L Final   08/18/2022 135 (L) 136 - 145 mmol/L Final      Potassium Potassium   Date Value Ref Range Status   08/20/2022 4.4 3.5 - 5.2 mmol/L Final   08/19/2022 4.4 3.5 - 5.2 mmol/L Final     Comment:     Slight hemolysis detected by analyzer. Results may be affected.   08/18/2022 4.1 3.5 - 5.2 mmol/L Final     Comment:     Slight hemolysis detected by analyzer. Results may be affected.      Chloride Chloride   Date Value Ref Range Status   08/20/2022 96 (L) 98 - 107 mmol/L Final   08/19/2022 93 (L) 98 - 107 mmol/L Final   08/18/2022 96 (L) 98 - 107 mmol/L Final      CO2 CO2   Date Value Ref Range Status   08/20/2022 30.0 (H) 22.0 - 29.0 mmol/L Final   08/19/2022 24.0 22.0 - 29.0 mmol/L Final   08/18/2022 26.0 22.0 - 29.0 mmol/L Final      BUN BUN   Date Value Ref Range Status   08/20/2022 86 (H) 8 - 23 mg/dL Final   08/19/2022 85 (H) 8 - 23 mg/dL Final   08/18/2022 82 (H) 8 - 23 mg/dL Final       Creatinine Creatinine   Date Value Ref Range Status   08/20/2022 2.97 (H) 0.76 - 1.27 mg/dL Final   08/19/2022 3.23 (H) 0.76 - 1.27 mg/dL Final   08/18/2022 3.17 (H) 0.76 - 1.27 mg/dL Final      Calcium Calcium   Date Value Ref Range Status   08/20/2022 8.2 (L) 8.6 - 10.5 mg/dL Final   08/19/2022 8.1 (L) 8.6 - 10.5 mg/dL Final   08/18/2022 8.3 (L) 8.6 - 10.5 mg/dL Final      PO4 No results found for: CAPO4   Albumin No results found for: ALBUMIN   Magnesium No results found for: MG   Uric Acid No results found for: URICACID        Results Review:     I reviewed the patient's new clinical results.    amLODIPine, 10 mg, Oral, Daily  amoxicillin-clavulanate, 1 tablet, Oral, Q12H  apixaban, 10 mg, Oral, Q12H   Followed by  [START ON 8/21/2022] apixaban, 5 mg, Oral, Q12H  aspirin, 81 mg, Oral, Daily  bethanechol, 10 mg, Oral, TID  bumetanide, 2 mg, Intravenous, Daily  cetirizine, 5 mg, Oral, Daily  insulin detemir, 15 Units, Subcutaneous, Nightly  insulin lispro, 0-9 Units, Subcutaneous, TID AC  Insulin Lispro, 5 Units, Subcutaneous, TID With Meals  levothyroxine, 50 mcg, Oral, Q AM  metoprolol succinate XL, 50 mg, Oral, Q24H  polyethylene glycol, 17 g, Oral, Daily  senna-docusate sodium, 2 tablet, Oral, BID  sodium chloride, 10 mL, Intravenous, Q12H  sodium chloride, 10 mL, Intravenous, Q12H  tamsulosin, 0.4 mg, Oral, Daily  vitamin D3, 5,000 Units, Oral, Daily           Medication Review: Reviewed    Assessment & Plan       Sepsis (HCC)    Type 2 diabetes mellitus (HCC)    Hypertension    Hyperlipidemia    Hypothyroidism (acquired)    S/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 Au    SSS (sick sinus syndrome) (HCC)    Dislocation of left shoulder joint, initial encounter    Cellulitis in diabetic foot (HCC)    Fall    Lactic acidosis    Proximal phalanx fracture of the second digit extending into the second metatarsal joint    Acute renal failure (ARF) (HCC)    Acute systolic heart failure (HCC)    DVT, bilateral lower  limbs (HCC)    Acute respiratory failure with hypoxia (HCC)        1.  Acute kidney injury: Baseline creatinine 1.02-1.05.  Patient was admitted and was started on antibiotic for osteomyelitis.  He received vancomycin which was started on 7/27/2022 until 8/4/2022 due to acute rise in creatinine to 2.07.  Renal artery duplex negative for renal artery stenosis.  Ultrasound kidney showed normal size kidney right 11.6 cm, left 11.1 cm.  Normal bladder.  CK1 71, C4 complement 34, C3 complement 137, TRISH negative, urine eos negative, urine protein 57, urine creatinine 150,Normal ultrasound kidney without  any obstruction.  No renal artery stenosis.  2.  Osteomyelitis left toe.  3.  Type 2 diabetes.  4.  History of hypertension  5.  History of hyperlipidemia.  6.  CAD s/p CABG 3/22/2019 by Dr. Romeos.  7.  Sick sinus syndrome.  8.  Proteinuria: Protein creatinine ratio 0.38 g      Recommendations plan.  Etiology of [place] thought to be ATN ( vancomycin related). Given urinary retention and worsening renal function now has medel cath. Recommend aoviding PICC line if possible. Continue bumex 2 mg IV for optimization of volume status. Daily labs. Strict I/O. Discussed with patient and wife at the bedside.      Blake Tilley MD  08/20/22  11:24 EDT

## 2022-08-20 NOTE — PLAN OF CARE
Goal Outcome Evaluation:  Plan of Care Reviewed With: patient        Progress: no change  Outcome Evaluation: Dep lift to chair. Pt unable to tolerate further mobility due to significant drop in BP to 84/56 and reports of dizziness. Pt denied pain. Continue with PT POC as pt tolerates. Recommend d/c to SNF when medically appropriate.

## 2022-08-20 NOTE — PLAN OF CARE
Goal Outcome Evaluation:  Plan of Care Reviewed With: patient        Progress: no change  Outcome Evaluation: OT facilitated pt in rolling L and R for lift pad placement requiring Amanda. Pt having episode of incontinence requiring MaxA for toileting hygiene while sidelying. Amanda for UB dressing, MaxA for hair grooming, SUA for facial hygiene. Continue to progress as able per current POC. Continue to recommend SNF upon d/c.

## 2022-08-20 NOTE — THERAPY TREATMENT NOTE
Patient Name: Jermaine Singh  : 1945    MRN: [id number]                              Today's Date: 2022       Admit Date: 2022    Visit Dx:     ICD-10-CM ICD-9-CM   1. Dislocation of left shoulder joint, initial encounter  S43.005A 831.00   2. Cellulitis of left foot  L03.116 682.7   3. 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  Preserved LV function   • Type 2 diabetes mellitus (HCC)   • Hypertension   • Hyperlipidemia   • Hypothyroidism (acquired)   • Vitamin D deficiency on Rx    • Serum Cr 1.32 on admission.  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 Au   • Dizziness   • Atherosclerosis of native artery of both lower extremities with intermittent claudication ([facility])   • SSS (sick sinus syndrome) ([facility])   • Dislocation of left shoulder joint, initial encounter   • Cellulitis in diabetic foot ([facility])   • Fall   • Sepsis ([facility])   • Lactic acidosis   • Proximal phalanx fracture of the second digit extending into the second metatarsal joint   • Acute renal failure (ARF) ([facility])   • Acute systolic heart failure ([facility])   • DVT, bilateral lower limbs ([facility])   • Acute respiratory failure with hypoxia ([facility])     Past Medical History:   Diagnosis Date   • Asthma    • Coronary artery disease    • Diabetes mellitus (HCC)     started on in[place] 2018; started on po meds in ; checking blood sugars daily    • Disease of thyroid gland     po meds daily for hypothyroidism    • History of fracture as a child     rt leg- severe    • Hyperlipidemia    • Hypertension    • Hypothyroidism    • Peripheral neuropathy    • Peripheral vascular disease (HCC)     s/p angiogram -needs stent in left leg    • RBBB    • Right knee pain    • Vitamin D deficiency      Past Surgical History:   Procedure Laterality Date   • APPENDECTOMY     • CARDIAC  CATHETERIZATION N/A 2/14/2019    Procedure: Left Heart Cath;  Surgeon: Cooper Apodaca MD;  Location:  [facility] CATH INVASIVE LOCATION;  Service: Cardiology   • CARDIAC ELECTROPHYSIOLOGY PROCEDURE N/A 5/18/2022    Procedure: DEVICE IMPLANT;  Surgeon: Cooper Apodaca MD;  Location:  [facility] CATH INVASIVE LOCATION;  Service: Cardiology;  Laterality: N/A;   • COLONOSCOPY     • CORONARY ARTERY BYPASS GRAFT N/A 3/22/2019    Procedure: MEDIAN STERNOTOMY, CORONARY ARTERY BYPASS GRAFT X3, UTILIZING THE LEFT INTERNAL MAMMARY ARTERY, EVH AND OPEN HARVEST OF THE RIGHT GREATER SAPHENOUS VEIN, EXPLORATION OF THE LEFT LEG;  Surgeon: Ap Au MD;  Location:  [facility] OR;  Service: Cardiothoracic   • EYE SURGERY Bilateral     cataracts    • INTERVENTIONAL RADIOLOGY PROCEDURE N/A 7/29/2021    Procedure: Abdominal Aortagram with Runoff;  Surgeon: Jermaine Hernandez MD;  Location:  [facility] CATH INVASIVE LOCATION;  Service: Cardiovascular;  Laterality: N/A;   • KNEE ARTHROSCOPY      right x 2, left x 1   • LACERATION REPAIR      right leg   • LEG SURGERY      2 for fracture of rt leg    • TONSILLECTOMY      Adnoidectomy   • TRANS METATARSAL AMPUTATION Left 8/2/2022    Procedure: GREAT TOE AMPUTATION LEFT;  Surgeon: Gopal Márquez MD;  Location:  [facility] OR;  Service: Vascular;  Laterality: Left;      General Information     Row Name 08/20/22 1528          OT Time and Intention    Document Type therapy note (daily note)  -MR     Mode of Treatment occupational therapy  -MR     Row Name 08/20/22 1528          General Information    Patient Profile Reviewed yes  -MR     Existing Precautions/Restrictions fall;left;non-weight bearing;other (see comments);oxygen therapy device and L/min  LUE WBAT (no ABDuction or ER), LLE NWB with offloading shoe  -MR     Barriers to Rehab medically complex;previous functional deficit  -MR     Row Name 08/20/22 1527          Cognition    Orientation Status (Cognition) oriented  to;person;place;situation;disoriented to;time  -MR     Row Name 08/20/22 1528          Safety Issues, Functional Mobility    Safety Issues Affecting Function (Mobility) insight into deficits/self-awareness;safety precaution awareness;safety precautions follow-through/compliance;ability to follow commands;judgment  -MR     Impairments Affecting Function (Mobility) endurance/activity tolerance;pain;range of motion (ROM);strength;shortness of breath;cognition  -MR     Cognitive Impairments, Mobility Safety/Performance awareness, need for assistance;insight into deficits/self-awareness;judgment;problem-solving/reasoning;safety precaution awareness;safety precaution follow-through;sequencing abilities  -MR           User Key  (r) = Recorded By, (t) = Taken By, (c) = Cosigned By    Initials Name Provider Type    MR Rosalinda Silva OT Occupational Therapist                 Mobility/ADL's     Row Name 08/20/22 1529          Bed Mobility    Bed Mobility rolling left;rolling right  -MR     Rolling Left Sarah (Bed Mobility) verbal cues;minimum assist (75% patient effort);1 person assist  -MR     Rolling Right Sarah (Bed Mobility) minimum assist (75% patient effort);1 person assist;verbal cues  -MR     Bed Mobility, Safety Issues decreased use of arms for pushing/pulling;decreased use of legs for bridging/pushing  -MR     Assistive Device (Bed Mobility) bed rails;head of bed elevated  -MR     Row Name 08/20/22 1529          Activities of Daily Living    BADL Assessment/Intervention upper body dressing;grooming;toileting  -MR     Row Name 08/20/22 1529          Mobility    Extremity Weight-bearing Status left upper extremity;left lower extremity  -MR     Left Upper Extremity (Weight-bearing Status) weight-bearing as tolerated (WBAT)  -MR     Left Lower Extremity (Weight-bearing Status) non weight-bearing (NWB)  -MR     Row Name 08/20/22 1529          Upper Body Dressing Assessment/Training    Sarah Level  (Upper Body Dressing) don;doff;other (see comments);minimum assist (75% patient effort)  hospital gown  -MR     Position (Upper Body Dressing) supported sitting  -MR     Row Name 08/20/22 1529          Grooming Assessment/Training    [place] Level (Grooming) wash face, hands;set up;maximum assist (25% patient effort);hair care, combing/brushing  -MR     Position (Grooming) supported sitting  -MR     Row Name 08/20/22 1529          Toileting Assessment/Training    [place] Level (Toileting) perform perineal hygiene;change pad/brief;dependent (less than 25% patient effort)  -MR     Assistive Devices (Toileting) other (see comments)  cleanup after BM while supine in bed.  -MR     Position (Toileting) supine  sidelying  -MR           User Key  (r) = Recorded By, (t) = Taken By, (c) = Cosigned By    Initials Name Provider Type    Rosalinda Domingo OT Occupational Therapist               Obj/Interventions     Row Name 08/20/22 1532          Balance    Balance Assessment sitting static balance  -MR     Static Sitting Balance standby assist  -MR     Position, Sitting Balance supported;sitting in chair  -MR     Balance Interventions sitting;supported;static;occupation based/functional task  -MR           User Key  (r) = Recorded By, (t) = Taken By, (c) = Cosigned By    Initials Name Provider Type    Rosalinda Domingo, ANG Occupational Therapist               Goals/Plan    No documentation.                Clinical Impression     Row Name 08/20/22 0969          Pain Assessment    Pretreatment Pain Rating 0/10 - no pain  -MR     Posttreatment Pain Rating 0/10 - no pain  -MR     Pre/Posttreatment Pain Comment Pt denied pain pre and post session  -MR     Pain Intervention(s) Ambulation/increased activity;Repositioned  -MR     Row Name 08/20/22 9702          Plan of Care Review    Plan of Care Reviewed With patient  -MR     Progress no change  -MR     Outcome Evaluation OT facilitated pt in rolling L and R for sling  placement requiring Amanda. Pt having episode of incontinence requiring MaxA for toileting hygiene while sidelying. Amanda for UB dressing, MaxA for hair grooming, SUA for facial hygiene. Continue to progress as able per current POC. Continue to recommend SNF upon d/c.  -MR     Row Name 08/20/22 1533          Therapy Plan Review/Discharge Plan (OT)    Anticipated Discharge Disposition (OT) skilled nursing facility  -MR     Row Name 08/20/22 1533          Vital Signs    Pre Systolic BP Rehab --  VSS  -MR     Pre SpO2 (%) 95  -MR     O2 Delivery Pre Treatment nasal cannula  -MR     O2 Delivery Intra Treatment nasal cannula  -MR     Post SpO2 (%) 95  -MR     O2 Delivery Post Treatment nasal cannula  -MR     Pre Patient Position Supine  -MR     Intra Patient Position Side Lying  -MR     Post Patient Position Supine  -MR     Row Name 08/20/22 1533          Positioning and Restraints    Pre-Treatment Position in bed  -MR     Post Treatment Position bed  -MR     In Bed supine;with PT  on lift pad  -MR           User Key  (r) = Recorded By, (t) = Taken By, (c) = Cosigned By    Initials Name Provider Type    MR Rosalinda Silva, OT Occupational Therapist               Outcome Measures     Row Name 08/20/22 6697          How much help from another is currently needed...    Putting on and taking off regular lower body clothing? 1  -MR     Bathing (including washing, rinsing, and drying) 1  -MR     Toileting (which includes using toilet bed pan or urinal) 1  -MR     Putting on and taking off regular upper body clothing 3  -MR     Taking care of personal grooming (such as brushing teeth) 3  -MR     Eating meals 3  -MR     AM-PAC 6 Clicks Score (OT) 12  -MR     Row Name 08/20/22 1531          How much help from another person do you currently need...    Turning from your back to your side while in flat bed without using bedrails? 3  -HP     Moving from lying on back to sitting on the side of a flat bed without bedrails? 2  -HP      Moving to and from a bed to a chair (including a wheelchair)? 1  -HP     Standing up from a chair using your arms (e.g., wheelchair, bedside chair)? 1  -HP     Climbing 3-5 steps with a railing? 1  -HP     To walk in hospital room? 1  -HP     AM-PAC 6 Clicks Score (PT) 9  -HP     Highest level of mobility 3 --> Sat at edge of bed  -HP     Row Name 08/20/22 1537 08/20/22 1531       Functional Assessment    Outcome Measure Options AM-PAC 6 Clicks Daily Activity (OT)  -MR AM-PAC 6 Clicks Basic Mobility (PT)  -HP          User Key  (r) = Recorded By, (t) = Taken By, (c) = Cosigned By    Initials Name Provider Type    HP Thelma Rich, PT Physical Therapist    MR Rosalinda Silva, OT Occupational Therapist                Occupational Therapy Education                 Title: PT OT SLP Therapies (Done)     Topic: Occupational Therapy (Done)     Point: ADL training (Done)     Description:   Instruct learner(s) on proper safety adaptation and remediation techniques during self care or transfers.   Instruct in proper use of assistive devices.              Learning Progress Summary           Patient Acceptance, E, VU,NR by MR at 8/20/2022 1538   Family Acceptance, E, VU,NR by AR at 8/15/2022 0959      Show all documentation for this point (15)                 Point: Home exercise program (Done)     Description:   Instruct learner(s) on appropriate technique for monitoring, assisting and/or progressing therapeutic exercises/activities.              Learning Progress Summary           Patient Acceptance, E, VU,NR by MR at 8/20/2022 1538   Family Acceptance, E, VU,NR by AR at 8/15/2022 0959      Show all documentation for this point (16)                 Point: Precautions (Done)     Description:   Instruct learner(s) on prescribed precautions during self-care and functional transfers.              Learning Progress Summary           Patient Acceptance, E, VU,NR by MR at 8/20/2022 1538   Family Acceptance, E, VU,NR by AR at  8/15/2022 0959      Show all documentation for this point (16)                 Point: Body mechanics (Done)     Description:   Instruct learner(s) on proper positioning and spine alignment during self-care, functional mobility activities and/or exercises.              Learning Progress Summary           Patient Acceptance, E, VU,NR by MR at 8/20/2022 1538   Family Acceptance, E, VU,NR by AR at 8/15/2022 0959      Show all documentation for this point (12)                             User Key     Initials Effective Dates Name Provider Type Discipline    AR 06/16/21 -  Nathalia Osborne, OT Occupational Therapist OT     10/06/21 -  Rosalinda Silva OT Occupational Therapist OT              OT Recommendation and Plan  Planned Therapy Interventions (OT): activity tolerance training, adaptive equipment training, BADL retraining, functional balance retraining, IADL retraining, occupation/activity based interventions, patient/caregiver education/training, transfer/mobility retraining, strengthening exercise, ROM/therapeutic exercise  Therapy Frequency (OT): daily  Plan of Care Review  Plan of Care Reviewed With: patient  Progress: no change  Outcome Evaluation: OT facilitated pt in rolling L and R for sling placement requiring Amanda. Pt having episode of incontinence requiring MaxA for toileting hygiene while sidelying. Amanda for UB dressing, MaxA for hair grooming, SUA for facial hygiene. Continue to progress as able per current POC. Continue to recommend SNF upon d/c.     Time Calculation:    Time Calculation- OT     Row Name 08/20/22 1538             Time Calculation- OT    OT Start Time 1417  -MR      OT Received On 08/20/22  -MR      OT Goal Re-Cert Due Date 08/25/22  -MR              Timed Charges    19439 - OT Therapeutic Activity Minutes 5  -MR      56419 - OT Self Care/Mgmt Minutes 10  -MR              Total Minutes    Timed Charges Total Minutes 15  -MR       Total Minutes 15  -MR            User Key  (r) =  Recorded By, (t) = Taken By, (c) = Cosigned By    Initials Name Provider Type    MR Rajiv Silva, ANG Occupational Therapist              Therapy Charges for Today     Code Description Service Date Service Provider Modifiers Qty    [id number] HC OT SELF CARE/MGMT/TRAIN EA 15 MIN 8/20/2022 Rajiv Silva OT GO 1               RAJIV SILVA OT  8/20/2022

## 2022-08-20 NOTE — PLAN OF CARE
Goal Outcome Evaluation:  Plan of Care Reviewed With: patient        Progress: no change  Outcome Evaluation: VSS, 4L NC, A&Ox4. Dressing CDI. NWB to LLE. No c/o pain. Fonseca catheter in place. Sat in chair. Continue to monitor.

## 2022-08-20 NOTE — PROGRESS NOTES
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, left, with fat layer exposed ([address] )          Subjective:      Patient ID: Darrick Moseley is a 80 y o  male  Follow-up on foot ulceration, patient had surgical management  For osteomyelitis, patient is currently on Iodosorb for dressing changes, patient report compliance, patient denies constitutional symptoms, patient denies trauma      The following portions of the patient's history were reviewed and updated as appropriate: allergies, current medications, past family history, past medical history, past social history, past surgical history and problem list     Review of Systems   Constitutional: Negative  Respiratory: Negative  Cardiovascular: Positive for leg swelling  Musculoskeletal: Positive for arthralgias  Skin: Positive for color change and wound  Historical Information   No past medical history on file  No past surgical history on file  Social History   Social History     Substance and Sexual Activity   Alcohol Use Not on file     Social History     Substance and Sexual Activity   Drug Use Not on file     Social History     Tobacco Use   Smoking Status Not on file     Family History: No family history on file  Meds/Allergies   all medications and allergies reviewed  No Known Allergies    Objective:      Resp 16   Ht 6' 4" (1 93 m)   Wt 97 1 kg (214 lb)   BMI 26 05 kg/m²          Physical Exam  Constitutional:       General: He is not in acute distress       Appearance: Cardiothoracic Surgery Progress Note      POD #: 1 postop day 18 -left great toe transmetatarsal amputation primary closure     LOS: 24 days      Subjective: No complaints. Awake and alert. Asking to get up to chair.    Objective:  Vital Signs vital signs below noted T-max past 24 hours 97.8 °F  Temp:  [97.7 °F (36.5 °C)-98 °F (36.7 °C)] 97.7 °F (36.5 °C)  Heart Rate:  [59-68] 61  Resp:  [17-19] 19  BP: ()/(55-89) 106/80    Physical Exam:   General Appearance: Awake and alert.   Lungs:   Heart:   Skin: Ulceration middle phalangeal joint left second toe with exposed phalangeal bone/joint space   Incision: Amputation site dressing change.  Overall's skin margins are viable with some ischemia in the most proximal portion.  Skin flaps are viable.     Results:  Results from last 7 days   Lab Units 08/20/22  0639   WBC 10*3/mm3 9.07   HEMOGLOBIN g/dL 9.8*   HEMATOCRIT % 28.8*   PLATELETS 10*3/mm3 157     Results from last 7 days   Lab Units 08/20/22  0639   SODIUM mmol/L 136   POTASSIUM mmol/L 4.4   CHLORIDE mmol/L 96*   CO2 mmol/L 30.0*   BUN mg/dL 86*   CREATININE mg/dL 2.97*   GLUCOSE mg/dL 139*   CALCIUM mg/dL 8.2*         Assessment: #1.  Postop day 18 left great toe transmetatarsal amputation primary closure overall healing well with some ischemia at the proximal third  To expose middle phalangeal joint space with exposed bone secondary to hammertoe phenomena and chronic diabetic ulceration  3 status post remote coronary bypass grafting  4 status post remote pacemaker placement for sick sinus syndrome  5.  Recent fall with rib shoulder dislocation reduced in the emergency department on this admission  6.  Endovascular angioplasty stenting right lower extremity per Dr. Hernandez 2019        Plan: Continue daily dressing changes amputation site as well as over the ulceration the middle phalangeal joint space of the left second toe.  Antibiotics infectious disease.  Medical manage per hospitalist/cardiologist.   He is well-developed  He is not ill-appearing, toxic-appearing or diaphoretic  HENT:      Head: Normocephalic  Cardiovascular:      Pulses:           Dorsalis pedis pulses are 1+ on the right side and 1+ on the left side  Posterior tibial pulses are 0 on the right side and 0 on the left side  Comments: Palpable DP pulse, nonpalpable PT pulse, CFT is less than 3 seconds, temperature gradient within normal limit, a trophic skin changes noted with skin thinning in shiny, patient report occasional claudication to bilateral calf, localized edema foot and ankle Q9  Pulmonary:      Effort: Pulmonary effort is normal    Musculoskeletal:         General: Tenderness and deformity present  Comments: Pes planus type foot pain on palpation and range of motion of the 1st MPJ, metatarsal heads bilateral foot, pain on palpation on range of motion of the ST joint, crepitus noted in midfoot joint  Feet:      Comments: Status post amputation to the left hallux,    Recent amputation to the 5th ray on the left,  Skin:     General: Skin is dry  Capillary Refill: Capillary refill takes 2 to 3 seconds  Comments: Trophic skin changes bilateral foot and ankle, alternating hypopigmentation and hyperpigmentation patches around the foot and ankle on anterior leg, skin is shiny and thin, absent hair growth, atrophy of fat pad  Healed amputation site at the left hallux,     surgical site and ulceration coapted, hyperkeratotic, no open lesion, no erythema no edema no signs of infection   Neurological:      Mental Status: He is alert and oriented to person, place, and time  Sensory: Sensory deficit present  Motor: Atrophy present        Deep Tendon Reflexes: Reflexes abnormal       Foot Exam    Right Foot/Ankle     Neurovascular  Dorsalis pedis: 1+  Posterior tibial: 0      Left Foot/Ankle      Neurovascular  Dorsalis pedis: 1+  Posterior tibial: 0 Renal medicine following closely for increasing creatinine and BUN.       Akbar Reid PA-C - 08/20/22 - 10:24 EDT

## 2022-08-21 PROBLEM — A41.9 SEPSIS: Status: RESOLVED | Noted: 2022-07-27 | Resolved: 2022-08-21

## 2022-08-21 LAB
ANION GAP SERPL CALCULATED.3IONS-SCNC: 14 MMOL/L (ref 5–15)
BUN SERPL-MCNC: 74 MG/DL (ref 8–23)
BUN/CREAT SERPL: 27.3 (ref 7–25)
CALCIUM SPEC-SCNC: 8.1 MG/DL (ref 8.6–10.5)
CHLORIDE SERPL-SCNC: 96 MMOL/L (ref 98–107)
CO2 SERPL-SCNC: 27 MMOL/L (ref 22–29)
CREAT SERPL-MCNC: 2.71 MG/DL (ref 0.76–1.27)
DEPRECATED RDW RBC AUTO: 40.5 FL (ref 37–54)
EGFRCR SERPLBLD CKD-EPI 2021: 23.4 ML/MIN/1.73
ERYTHROCYTE [DISTWIDTH] IN BLOOD BY AUTOMATED COUNT: 12.9 % (ref 12.3–15.4)
GLUCOSE BLDC GLUCOMTR-MCNC: 115 MG/DL (ref 70–130)
GLUCOSE BLDC GLUCOMTR-MCNC: 184 MG/DL (ref 70–130)
GLUCOSE BLDC GLUCOMTR-MCNC: 203 MG/DL (ref 70–130)
GLUCOSE BLDC GLUCOMTR-MCNC: 213 MG/DL (ref 70–130)
GLUCOSE SERPL-MCNC: 215 MG/DL (ref 65–99)
HCT VFR BLD AUTO: 30.2 % (ref 37.5–51)
HGB BLD-MCNC: 10.1 G/DL (ref 13–17.7)
MCH RBC QN AUTO: 29.2 PG (ref 26.6–33)
MCHC RBC AUTO-ENTMCNC: 33.4 G/DL (ref 31.5–35.7)
MCV RBC AUTO: 87.3 FL (ref 79–97)
PLATELET # BLD AUTO: 163 10*3/MM3 (ref 140–450)
PMV BLD AUTO: 13.7 FL (ref 6–12)
POTASSIUM SERPL-SCNC: 4.3 MMOL/L (ref 3.5–5.2)
RBC # BLD AUTO: 3.46 10*6/MM3 (ref 4.14–5.8)
SODIUM SERPL-SCNC: 137 MMOL/L (ref 136–145)
WBC NRBC COR # BLD: 9.2 10*3/MM3 (ref 3.4–10.8)

## 2022-08-21 PROCEDURE — 94799 UNLISTED PULMONARY SVC/PX: CPT

## 2022-08-21 PROCEDURE — 63710000001 INSULIN LISPRO (HUMAN) PER 5 UNITS: Performed by: INTERNAL MEDICINE

## 2022-08-21 PROCEDURE — 82962 GLUCOSE BLOOD TEST: CPT

## 2022-08-21 PROCEDURE — 99024 POSTOP FOLLOW-UP VISIT: CPT | Performed by: PHYSICIAN ASSISTANT

## 2022-08-21 PROCEDURE — 85027 COMPLETE CBC AUTOMATED: CPT | Performed by: FAMILY MEDICINE

## 2022-08-21 PROCEDURE — 99233 SBSQ HOSP IP/OBS HIGH 50: CPT | Performed by: INTERNAL MEDICINE

## 2022-08-21 PROCEDURE — 80048 BASIC METABOLIC PNL TOTAL CA: CPT | Performed by: FAMILY MEDICINE

## 2022-08-21 PROCEDURE — 63710000001 INSULIN DETEMIR PER 5 UNITS: Performed by: FAMILY MEDICINE

## 2022-08-21 PROCEDURE — 63710000001 INSULIN LISPRO (HUMAN) PER 5 UNITS: Performed by: FAMILY MEDICINE

## 2022-08-21 RX ADMIN — IPRATROPIUM BROMIDE AND ALBUTEROL SULFATE 3 ML: .5; 3 SOLUTION RESPIRATORY (INHALATION) at 03:40

## 2022-08-21 RX ADMIN — SENNOSIDES AND DOCUSATE SODIUM 2 TABLET: 50; 8.6 TABLET ORAL at 08:01

## 2022-08-21 RX ADMIN — SENNOSIDES AND DOCUSATE SODIUM 2 TABLET: 50; 8.6 TABLET ORAL at 20:35

## 2022-08-21 RX ADMIN — TAMSULOSIN HYDROCHLORIDE 0.4 MG: 0.4 CAPSULE ORAL at 08:01

## 2022-08-21 RX ADMIN — LEVOTHYROXINE SODIUM 50 MCG: 50 TABLET ORAL at 06:05

## 2022-08-21 RX ADMIN — INSULIN LISPRO 4 UNITS: 100 INJECTION, SOLUTION INTRAVENOUS; SUBCUTANEOUS at 11:50

## 2022-08-21 RX ADMIN — ACETAMINOPHEN 650 MG: 325 TABLET, FILM COATED ORAL at 20:43

## 2022-08-21 RX ADMIN — ASPIRIN 81 MG CHEWABLE TABLET 81 MG: 81 TABLET CHEWABLE at 08:00

## 2022-08-21 RX ADMIN — BETHANECHOL CHLORIDE 10 MG: 10 TABLET ORAL at 08:01

## 2022-08-21 RX ADMIN — Medication 5000 UNITS: at 08:01

## 2022-08-21 RX ADMIN — APIXABAN 5 MG: 5 TABLET, FILM COATED ORAL at 08:24

## 2022-08-21 RX ADMIN — APIXABAN 5 MG: 5 TABLET, FILM COATED ORAL at 20:36

## 2022-08-21 RX ADMIN — BETHANECHOL CHLORIDE 10 MG: 10 TABLET ORAL at 20:36

## 2022-08-21 RX ADMIN — AMOXICILLIN AND CLAVULANATE POTASSIUM 500 MG: 500; 125 TABLET, FILM COATED ORAL at 08:01

## 2022-08-21 RX ADMIN — AMOXICILLIN AND CLAVULANATE POTASSIUM 500 MG: 500; 125 TABLET, FILM COATED ORAL at 20:36

## 2022-08-21 RX ADMIN — INSULIN LISPRO 5 UNITS: 100 INJECTION, SOLUTION INTRAVENOUS; SUBCUTANEOUS at 08:06

## 2022-08-21 RX ADMIN — BUMETANIDE 2 MG: 0.25 INJECTION INTRAMUSCULAR; INTRAVENOUS at 08:00

## 2022-08-21 RX ADMIN — INSULIN LISPRO 5 UNITS: 100 INJECTION, SOLUTION INTRAVENOUS; SUBCUTANEOUS at 11:50

## 2022-08-21 RX ADMIN — BETHANECHOL CHLORIDE 10 MG: 10 TABLET ORAL at 17:11

## 2022-08-21 RX ADMIN — CETIRIZINE HYDROCHLORIDE 5 MG: 10 TABLET, FILM COATED ORAL at 08:01

## 2022-08-21 RX ADMIN — INSULIN DETEMIR 15 UNITS: 100 INJECTION, SOLUTION SUBCUTANEOUS at 20:37

## 2022-08-21 RX ADMIN — INSULIN LISPRO 4 UNITS: 100 INJECTION, SOLUTION INTRAVENOUS; SUBCUTANEOUS at 08:02

## 2022-08-21 NOTE — PROGRESS NOTES
"   LOS: 25 days    Patient Care Team:  Farooq Painter MD as PCP - General (Family Medicine)  Jermaine Hernandez MD as Consulting Physician (Cardiology)    Chief Complaint:   77-year-old male  left foot to infection has been treated with antibiotic.  Initial creatinine has been within acceptable range, patient received IV vancomycin on starting 7/27/2022, was continued until 8/4/2022 at that time the creatinine started to go up to 2.02 patient's antibiotic was changed, taken off the vancomycin at that time creatinine started to get improved 1.8 slowly over the last 3 days creatinine has been slowly creeping up.  Patient has a past medical history of CAD, CABG, hyperlipidemia, diabetes type 2, hypertension, hypothyroidism, peripheral vascular disease, sick sinus syndrome with pacemaker placement. creatinine 2.07 BUN of 51, albumin 2.9, white count 14.2, platelets 209 normal. Patient is on ampicillin IV, and IV Solu-Medrol 60 mg.  Subjective     Interval History:   Renal function improving. Resp status improving. Mentation is improving.    Review of Systems:   Complain generalized weakness other than that all other systems are negative.    Objective     Vital Sign Min/Max for last 24 hours  Temp  Min: 97.7 °F (36.5 °C)  Max: 97.8 °F (36.6 °C)   BP  Min: 97/65  Max: 115/71   Pulse  Min: 59  Max: 72   Resp  Min: 16  Max: 18   SpO2  Min: 91 %  Max: 97 %   Flow (L/min)  Min: 2  Max: 4   No data recorded     Flowsheet Rows    Flowsheet Row First Filed Value   Admission Height 190.5 cm (75\") Documented at 07/27/2022 1848   Admission Weight 98 kg (216 lb) Documented at 07/27/2022 1848          I/O this shift:  In: -   Out: 750 [Urine:750]  I/O last 3 completed shifts:  In: 460 [P.O.:460]  Out: 2750 [Urine:2750]    Physical Exam:  General Appearance: Awake alert oriented  male sitting comfortably in chair.  Eyes: PER, EOMI.  Neck: Supple no JVD.  Lungs: Clear auscultation, no rales rhonchi's, equal chest movement, " nonlabored.  Heart: No gallop, murmur, rub, RRR.  Abdomen: Soft, nontender, positive bowel sounds, no organomegaly.  Extremities: No edema, no cyanosis.  Left toe in bandage  Neuro: No focal deficit, moving all extremities, alert oriented X 3   no suprapubic fullness or tenderness  WBC WBC   Date Value Ref Range Status   08/21/2022 9.20 3.40 - 10.80 10*3/mm3 Final   08/20/2022 9.07 3.40 - 10.80 10*3/mm3 Final   08/19/2022 11.20 (H) 3.40 - 10.80 10*3/mm3 Final      HGB Hemoglobin   Date Value Ref Range Status   08/21/2022 10.1 (L) 13.0 - 17.7 g/dL Final   08/20/2022 9.8 (L) 13.0 - 17.7 g/dL Final   08/19/2022 10.7 (L) 13.0 - 17.7 g/dL Final      HCT Hematocrit   Date Value Ref Range Status   08/21/2022 30.2 (L) 37.5 - 51.0 % Final   08/20/2022 28.8 (L) 37.5 - 51.0 % Final   08/19/2022 31.9 (L) 37.5 - 51.0 % Final      Platlets No results found for: LABPLAT   MCV MCV   Date Value Ref Range Status   08/21/2022 87.3 79.0 - 97.0 fL Final   08/20/2022 86.2 79.0 - 97.0 fL Final   08/19/2022 87.2 79.0 - 97.0 fL Final          Sodium Sodium   Date Value Ref Range Status   08/21/2022 137 136 - 145 mmol/L Final   08/20/2022 136 136 - 145 mmol/L Final   08/19/2022 133 (L) 136 - 145 mmol/L Final      Potassium Potassium   Date Value Ref Range Status   08/21/2022 4.3 3.5 - 5.2 mmol/L Final   08/20/2022 4.4 3.5 - 5.2 mmol/L Final   08/19/2022 4.4 3.5 - 5.2 mmol/L Final     Comment:     Slight hemolysis detected by analyzer. Results may be affected.      Chloride Chloride   Date Value Ref Range Status   08/21/2022 96 (L) 98 - 107 mmol/L Final   08/20/2022 96 (L) 98 - 107 mmol/L Final   08/19/2022 93 (L) 98 - 107 mmol/L Final      CO2 CO2   Date Value Ref Range Status   08/21/2022 27.0 22.0 - 29.0 mmol/L Final   08/20/2022 30.0 (H) 22.0 - 29.0 mmol/L Final   08/19/2022 24.0 22.0 - 29.0 mmol/L Final      BUN BUN   Date Value Ref Range Status   08/21/2022 74 (H) 8 - 23 mg/dL Final   08/20/2022 86 (H) 8 - 23 mg/dL Final   08/19/2022  85 (H) 8 - 23 mg/dL Final      Creatinine Creatinine   Date Value Ref Range Status   08/21/2022 2.71 (H) 0.76 - 1.27 mg/dL Final   08/20/2022 2.97 (H) 0.76 - 1.27 mg/dL Final   08/19/2022 3.23 (H) 0.76 - 1.27 mg/dL Final      Calcium Calcium   Date Value Ref Range Status   08/21/2022 8.1 (L) 8.6 - 10.5 mg/dL Final   08/20/2022 8.2 (L) 8.6 - 10.5 mg/dL Final   08/19/2022 8.1 (L) 8.6 - 10.5 mg/dL Final      PO4 No results found for: CAPO4   Albumin No results found for: ALBUMIN   Magnesium No results found for: MG   Uric Acid No results found for: URICACID        Results Review:     I reviewed the patient's new clinical results.    amLODIPine, 10 mg, Oral, Daily  amoxicillin-clavulanate, 1 tablet, Oral, Q12H  apixaban, 5 mg, Oral, Q12H  aspirin, 81 mg, Oral, Daily  bethanechol, 10 mg, Oral, TID  bumetanide, 2 mg, Intravenous, Daily  cetirizine, 5 mg, Oral, Daily  insulin detemir, 15 Units, Subcutaneous, Nightly  insulin lispro, 0-9 Units, Subcutaneous, TID AC  Insulin Lispro, 5 Units, Subcutaneous, TID With Meals  levothyroxine, 50 mcg, Oral, Q AM  metoprolol succinate XL, 50 mg, Oral, Q24H  polyethylene glycol, 17 g, Oral, Daily  senna-docusate sodium, 2 tablet, Oral, BID  sodium chloride, 10 mL, Intravenous, Q12H  sodium chloride, 10 mL, Intravenous, Q12H  tamsulosin, 0.4 mg, Oral, Daily  vitamin D3, 5,000 Units, Oral, Daily           Medication Review: Reviewed    Assessment & Plan       Sepsis (HCC)    Type 2 diabetes mellitus (HCC)    Hypertension    Hyperlipidemia    Hypothyroidism (acquired)    S/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 Au    SSS (sick sinus syndrome) (HCC)    Dislocation of left shoulder joint, initial encounter    Cellulitis in diabetic foot (HCC)    Fall    Lactic acidosis    Proximal phalanx fracture of the second digit extending into the second metatarsal joint    Acute renal failure (ARF) (HCC)    Acute systolic heart failure (HCC)    DVT, bilateral lower limbs (HCC)    Acute respiratory  failure with hypoxia (HCC)        1.  Acute kidney injury: Baseline creatinine 1.02-1.05.  Patient was admitted and was started on antibiotic for osteomyelitis.  He received vancomycin which was started on 7/27/2022 until 8/4/2022 due to acute rise in creatinine to 2.07.  Renal artery duplex negative for renal artery stenosis.  Ultrasound kidney showed normal size kidney right 11.6 cm, left 11.1 cm.  Normal bladder.  CK1 71, C4 complement 34, C3 complement 137, TRISH negative, urine eos negative, urine protein 57, urine creatinine 150,Normal ultrasound kidney without  any obstruction.  No renal artery stenosis.  2.  Osteomyelitis left toe.  3.  Type 2 diabetes.  4.  History of hypertension  5.  History of hyperlipidemia.  6.  CAD s/p CABG 3/22/2019 by Dr. Au's.  7.  Sick sinus syndrome.  8.  Proteinuria: Protein creatinine ratio 0.38 g      Recommendations plan.  Etiology of [place] thought to be ATN ( vancomycin related). Recommend aoviding PICC line if possible. Continue bumex 2 mg IV for optimization of volume status. Daily labs. Strict I/O. Discussed with patient and wife at the bedside.      Blake Tilley MD  08/21/22  12:00 EDT

## 2022-08-21 NOTE — PROGRESS NOTES
Cardiothoracic Surgery Progress Note      POD  postop day 19 -left great toe transmetatarsal amputation primary closure     LOS: 25 days      Subjective: No complaints. Awake and alert. Asking to get up to chair.    Objective:  Vital Signs vital signs below noted T-max past 24 hours 97.8 °F  Temp:  [97.4 °F (36.3 °C)-97.8 °F (36.6 °C)] 97.4 °F (36.3 °C)  Heart Rate:  [59-72] 61  Resp:  [16-18] 16  BP: ()/(64-71) 97/70    Physical Exam:   General Appearance: Awake and alert.   Lungs:   Heart:   Skin: Ulceration middle phalangeal joint left second toe with exposed phalangeal bone/joint space   Incision: Amputation site dressing change.  Overall's skin margins are viable with some ischemia in the most proximal portion.  Skin flaps are viable.     Results:  Results from last 7 days   Lab Units 08/21/22  0646   WBC 10*3/mm3 9.20   HEMOGLOBIN g/dL 10.1*   HEMATOCRIT % 30.2*   PLATELETS 10*3/mm3 163     Results from last 7 days   Lab Units 08/21/22  0646   SODIUM mmol/L 137   POTASSIUM mmol/L 4.3   CHLORIDE mmol/L 96*   CO2 mmol/L 27.0   BUN mg/dL 74*   CREATININE mg/dL 2.71*   GLUCOSE mg/dL 215*   CALCIUM mg/dL 8.1*         Assessment: #1.  Postop day 19 left great toe transmetatarsal amputation primary closure overall healing well with some ischemia at the proximal third  To expose middle phalangeal joint space with exposed bone secondary to hammertoe phenomena and chronic diabetic ulceration  3 status post remote coronary bypass grafting  4 status post remote pacemaker placement for sick sinus syndrome  5.  Recent fall with rib shoulder dislocation reduced in the emergency department on this admission  6.  Endovascular angioplasty stenting right lower extremity per Dr. Hernandez 2019        Plan: Continue daily dressing changes amputation site as well as over the ulceration the middle phalangeal joint space of the left second toe.  Antibiotics infectious disease.  Medical manage per hospitalist/cardiologist.  Renal  medicine following closely for increasing creatinine and BUN.       Akbar Reid PA-C - 08/21/22 - 14:40 EDT

## 2022-08-21 NOTE — PLAN OF CARE
Goal Outcome Evaluation:  Plan of Care Reviewed With: patient        Progress: no change  Outcome Evaluation: Pt has rested well this shift with no complaints. VSS, 4L NC. LLE dressing is CDI with boot in place. NWB to LLE. BM this shift. Fonseca catheter remains in place. Continue to monitor.

## 2022-08-21 NOTE — PROGRESS NOTES
[facility] Medicine Services  PROGRESS NOTE    Patient Name: Jermaine Singh  : 1945  MRN: [id number]    Date of Admission: 2022  Primary Care Physician: Farooq Painter MD    Subjective   Subjective     CC:  Follow-up hypoxia, renal injury    HPI:  Seen this afternoon just after lunch, did well with lunch and is resting comfortably now.  Oxygen requirements have overall improved, he overall feels better.  Creatinine is trending down.    ROS:  Gen- No fevers, chills  CV- No chest pain, palpitations  Resp- No cough, dyspnea  GI- No N/V/D, abd pain     Objective   Objective     Vital Signs:   Temp:  [97.7 °F (36.5 °C)-98.2 °F (36.8 °C)] 97.7 °F (36.5 °C)  Heart Rate:  [59-87] 63  Resp:  [16-18] 16  BP: (101-115)/(64-80) 105/66  Flow (L/min):  [2-4] 4     Physical Exam:  Constitutional: Initially sleeping but easily awoken, alert, no acute distress  HENT: NCAT, mucous membranes moist  Respiratory: Clear to auscultation bilaterally, respiratory effort normal   Cardiovascular: RRR, no murmurs, rubs, or gallops, palpable radial pulses  Gastrointestinal: Positive bowel sounds, soft, nontender, nondistended  Musculoskeletal: LT foot with postoperative bandaging  Psychiatric: Appropriate affect, cooperative  Neurologic: Speech clear and fluent, moving extremities spontaneously    Results Reviewed:  LAB RESULTS:      Lab 22  0646 22  0639 22  0333 08/15/22  0615 22  1132   WBC 9.20 9.07 11.20* 9.63  --    HEMOGLOBIN 10.1* 9.8* 10.7* 10.6*  --    HEMATOCRIT 30.2* 28.8* 31.9* 32.6*  --    PLATELETS 163 157 160 199  --    MCV 87.3 86.2 87.2 90.1  --    HEPARIN ANTI-XA  --   --   --   --  0.10*         Lab 22  0646 22  0639 22  0333 22  0327 22  0507 08/15/22  0615   SODIUM 137 136 133* 135* 137 138   POTASSIUM 4.3 4.4 4.4 4.1 4.6 4.9   CHLORIDE 96* 96* 93* 96* 96* 97*   CO2 27.0 30.0* 24.0 26.0 28.0 27.0   ANION GAP 14.0 10.0 16.0* 13.0  13.0 14.0   BUN 74* 86* 85* 82* 78* 64*   CREATININE 2.71* 2.97* 3.23* 3.17* 2.66* 2.16*   EGFR 23.4* 21.0* 19.0* 19.4* 24.0* 30.8*   GLUCOSE 215* 139* 225* 63* 232* 225*   CALCIUM 8.1* 8.2* 8.1* 8.3* 8.2* 8.8   PHOSPHORUS  --   --   --   --   --  5.5*         Lab 08/15/22  0615   ALBUMIN 3.60                     Brief Urine Lab Results  (Last result in the past 365 days)      Color   Clarity   Blood   Leuk Est   Nitrite   Protein   CREAT   Urine HCG        08/14/22 1818             149.9               Microbiology Results Abnormal     Procedure Component Value - Date/Time    Eosinophil Smear - Urine, Urine, Clean Catch [[id number]]  (Normal) Collected: 08/14/22 1818    Lab Status: Final result Specimen: Urine, Clean Catch Updated: 08/14/22 2101     Eosinophil Smear 0 % EOS/100 Cells     Narrative:      No eosinophil seen    Anaerobic Culture - Tissue, Toe, Left [[id number]] Collected: 08/02/22 1027    Lab Status: Final result Specimen: Tissue from Toe, Left Updated: 08/07/22 0858     Anaerobic Culture No anaerobes isolated at 5 days    Blood Culture - Blood, Hand, Left [[id number]]  (Normal) Collected: 07/27/22 2040    Lab Status: Final result Specimen: Blood from Hand, Left Updated: 08/01/22 2132     Blood Culture No growth at 5 days    Blood Culture - Blood, Hand, Right [[id number]]  (Normal) Collected: 07/27/22 2030    Lab Status: Final result Specimen: Blood from Hand, Right Updated: 08/01/22 2132     Blood Culture No growth at 5 days    COVID PRE-OP / PRE-PROCEDURE SCREENING ORDER (NO ISOLATION) - Swab, Nasopharynx [[id number]]  (Normal) Collected: 07/27/22 2246    Lab Status: Final result Specimen: Swab from Nasopharynx Updated: 07/27/22 2357    Narrative:      The following orders were created for panel order COVID PRE-OP / PRE-PROCEDURE SCREENING ORDER (NO ISOLATION) - Swab, Nasopharynx.  Procedure                               Abnormality         Status                     ---------                                -----------         ------                     COVID-19 and FLU A/B PCR...[[id number]]  Normal              Final result                 Please view results for these tests on the individual orders.    COVID-19 and FLU A/B PCR - Swab, Nasopharynx [[id number]]  (Normal) Collected: 07/27/22 2246    Lab Status: Final result Specimen: Swab from Nasopharynx Updated: 07/27/22 3271     COVID19 Not Detected     Influenza A PCR Not Detected     Influenza B PCR Not Detected    Narrative:      Fact sheet for providers: https://www.fda.gov/media/975402/download    Fact sheet for patients: https://www.fda.gov/media/872042/download    Test performed by PCR.          No radiology results from the last 24 hrs    Results for orders placed during the hospital encounter of 07/27/22    Adult Transthoracic Echo Complete W/ Cont if Necessary Per Protocol    Interpretation Summary  · Left ventricular ejection fraction appears to be 46 - 50%. Left ventricular systolic function is low normal.  · Left ventricular septal hypokinesis  · No significant valvular heart disease      I have reviewed the medications:  Scheduled Meds:amLODIPine, 10 mg, Oral, Daily  amoxicillin-clavulanate, 1 tablet, Oral, Q12H  apixaban, 5 mg, Oral, Q12H  aspirin, 81 mg, Oral, Daily  bethanechol, 10 mg, Oral, TID  bumetanide, 2 mg, Intravenous, Daily  cetirizine, 5 mg, Oral, Daily  insulin detemir, 15 Units, Subcutaneous, Nightly  insulin lispro, 0-9 Units, Subcutaneous, TID AC  Insulin Lispro, 5 Units, Subcutaneous, TID With Meals  levothyroxine, 50 mcg, Oral, Q AM  metoprolol succinate XL, 50 mg, Oral, Q24H  polyethylene glycol, 17 g, Oral, Daily  senna-docusate sodium, 2 tablet, Oral, BID  sodium chloride, 10 mL, Intravenous, Q12H  sodium chloride, 10 mL, Intravenous, Q12H  tamsulosin, 0.4 mg, Oral, Daily  vitamin D3, 5,000 Units, Oral, Daily      Continuous Infusions:   PRN Meds:.•  acetaminophen **OR** acetaminophen **OR** acetaminophen  •  benzocaine-menthol  •   bisacodyl  •  calcium carbonate  •  dextrose  •  dextrose  •  glucagon (human recombinant)  •  HYDROcodone-acetaminophen  •  ipratropium-albuterol  •  nitroglycerin  •  ondansetron **OR** ondansetron  •  phenol  •  prochlorperazine  •  sennosides-docusate  •  simethicone  •  [COMPLETED] Insert peripheral IV **AND** sodium chloride  •  sodium chloride  •  sodium chloride    Assessment & Plan   Assessment & Plan     Active Hospital Problems    Diagnosis  POA   • **Acute renal failure (ARF) ([facility]) [N17.9]  Yes   • Acute systolic heart failure ([facility]) [I50.21]  No   • DVT, bilateral lower limbs ([facility]) [I82.403]  No   • Acute respiratory failure with hypoxia ([facility]) [J96.01]  No   • Proximal phalanx fracture of the second digit extending into the second metatarsal joint [S92.919A]  Yes   • Dislocation of left shoulder joint, initial encounter [S43.005A]  Yes   • Cellulitis in diabetic foot ([facility]) [E11.628, L03.119]  Yes   • Fall [W19.XXXA]  Yes   • SSS (sick sinus syndrome) ([facility]) [I49.5]  Yes   • Hyperlipidemia [E78.5]  Yes   • Hypertension [I10]  Yes   • S/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 Au [Z95.1]  Not Applicable   • Hypothyroidism (acquired) [E03.9]  Yes   • Type 2 diabetes mellitus ([facility]) [E11.9]  Yes      Resolved Hospital Problems    Diagnosis Date Resolved POA   • Sepsis ([facility]) [A41.9] 08/21/2022 Yes        Brief Hospital Course to date:  Jermaine Singh is a 77 y.o. male w/ DM2, CAD s/p prior CABG, PVD s/p RT LE angioplasty 7/2021 who initially presented to the ED 7/27/22 after sustaining a fall while walking his dog, identified to have LT shoulder dislocation reduced in the ED, at that time he had systemic markers for illness, admitted for concern of osteomyelitis and started on empiric IV antibiotics; he is now s/p LT first toe amputation 8/2 w/ Dr. Márquez; his hospitalization has been prolonged/complicated by acute pulmonary disease with marked hypoxia, and significant renal dysfunction; he has been followed by ID,  nephrology, cardiology with slow but gradual recovery    The following problems are new to me today    Assessment/plan    Acute renal failure on likely CKD 2  -Base eGFR 60-70s;Cr 0.9-1.2  - Renal US unrevealing, renal arterial duplex w/o evidence for stenosis  -Creatinine peaked 3.23, gradually trending down  -Nephrology follows, continue IV diuresis per their recommendations  - Labs in the a.m., monitor I&O    Acute hypoxic respiratory insufficiency  Acute HFrEF  -Cardiology following, interpreted EF 40%  - VQ scan interpreted as low probability  - Oral Toprol-XL; other meds limited 2/2 renal disease; cards following  -Renal failure likely a contributing factor  -IV Bumex per nephrology    LT first toe osteomyelitis/cellulitis, polymicrobial, POA  PAD s/p prior angioplasty to RT LE  -Follows w/ Dr. Joseph (podiatry) opt; on PO doxy PTA  -Arterial duplex strongly suggestive of PAD; no revasc per sgy/vasc team  - Tissue Cx w/ Enterococcus faecalis/casseliflavus  -S/p LT first toe amputation 8/2 w/ Dr. Márquez  -Lost PICC line 8/18; no replacement 2/2 renal disease  -S/p IV Unasyn, now on oral Augmentin due to loss of IV access, planned through 9/13/22 (x6-week postoperative)    Bilateral LE DVTs  -Continue Eliquis    Fall w/ anterior LT shoulder dislocation, reduced in ED  -Orthopedics (Dr. Gipson) has seen, recommend nonoperative management, sling for comfort  -Per Ortho: Shoulder ROM as tolerated, avoid abduction+ external rotation, continue PT/OT, follow-up outpatient  - Approaching timeline for outpatient follow-up, patient remains hospitalized, may ask Ortho for brief follow-up PTD for rehab recommendations    Acute urinary retention  -cont bethanechol, Flomax  - FC placed 8/18 per renal  - Voiding trial after aggressive IV diuresis     DM type 2, A1c 9.0%, with long-term use of insulin  -Basal bolus insulin, Accu-Cheks w/ SSI    Hypertension  CAD s/p prior CABG  SSS s/p PPM  - Next device check 12/28/22  -cont  Toprol-XL, amlodipine, aspirin  - ACEi on hold 2/2 renal disease    Hypothyroidism  -Levothyroxine    Constipation w/ N/V  -Improved?, Continue bowel regimen    Expected Discharge Location and Transportation: [facility]  Expected Discharge Date: 8/25    DVT prophylaxis:  Medical and mechanical DVT prophylaxis orders are present.     AM-PAC 6 Clicks Score (PT): 9 (08/20/22 1531)    CODE STATUS:   Code Status and Medical Interventions:   Ordered at: 07/28/22 0000     Code Status (Patient has no pulse and is not breathing):    CPR (Attempt to Resuscitate)     Medical Interventions (Patient has pulse or is breathing):    Full Support       Navneet Venegas, DO  08/21/22

## 2022-08-22 LAB
ALBUMIN SERPL-MCNC: 3 G/DL (ref 3.5–5.2)
ANION GAP SERPL CALCULATED.3IONS-SCNC: 8 MMOL/L (ref 5–15)
BUN SERPL-MCNC: 73 MG/DL (ref 8–23)
BUN/CREAT SERPL: 27.7 (ref 7–25)
CALCIUM SPEC-SCNC: 8.4 MG/DL (ref 8.6–10.5)
CHLORIDE SERPL-SCNC: 98 MMOL/L (ref 98–107)
CO2 SERPL-SCNC: 32 MMOL/L (ref 22–29)
CREAT SERPL-MCNC: 2.64 MG/DL (ref 0.76–1.27)
EGFRCR SERPLBLD CKD-EPI 2021: 24.2 ML/MIN/1.73
GLUCOSE BLDC GLUCOMTR-MCNC: 103 MG/DL (ref 70–130)
GLUCOSE BLDC GLUCOMTR-MCNC: 171 MG/DL (ref 70–130)
GLUCOSE BLDC GLUCOMTR-MCNC: 171 MG/DL (ref 70–130)
GLUCOSE BLDC GLUCOMTR-MCNC: 193 MG/DL (ref 70–130)
GLUCOSE SERPL-MCNC: 194 MG/DL (ref 65–99)
PHOSPHATE SERPL-MCNC: 4.5 MG/DL (ref 2.5–4.5)
POTASSIUM SERPL-SCNC: 4.7 MMOL/L (ref 3.5–5.2)
SODIUM SERPL-SCNC: 138 MMOL/L (ref 136–145)

## 2022-08-22 PROCEDURE — 97530 THERAPEUTIC ACTIVITIES: CPT

## 2022-08-22 PROCEDURE — 63710000001 INSULIN LISPRO (HUMAN) PER 5 UNITS: Performed by: INTERNAL MEDICINE

## 2022-08-22 PROCEDURE — 80069 RENAL FUNCTION PANEL: CPT | Performed by: INTERNAL MEDICINE

## 2022-08-22 PROCEDURE — 99232 SBSQ HOSP IP/OBS MODERATE 35: CPT | Performed by: INTERNAL MEDICINE

## 2022-08-22 PROCEDURE — 82962 GLUCOSE BLOOD TEST: CPT

## 2022-08-22 PROCEDURE — 94799 UNLISTED PULMONARY SVC/PX: CPT

## 2022-08-22 PROCEDURE — 63710000001 INSULIN LISPRO (HUMAN) PER 5 UNITS: Performed by: FAMILY MEDICINE

## 2022-08-22 PROCEDURE — 97535 SELF CARE MNGMENT TRAINING: CPT | Performed by: OCCUPATIONAL THERAPIST

## 2022-08-22 PROCEDURE — 97530 THERAPEUTIC ACTIVITIES: CPT | Performed by: OCCUPATIONAL THERAPIST

## 2022-08-22 PROCEDURE — 63710000001 INSULIN DETEMIR PER 5 UNITS: Performed by: FAMILY MEDICINE

## 2022-08-22 PROCEDURE — 99024 POSTOP FOLLOW-UP VISIT: CPT

## 2022-08-22 RX ADMIN — BETHANECHOL CHLORIDE 10 MG: 10 TABLET ORAL at 08:45

## 2022-08-22 RX ADMIN — INSULIN LISPRO 5 UNITS: 100 INJECTION, SOLUTION INTRAVENOUS; SUBCUTANEOUS at 17:05

## 2022-08-22 RX ADMIN — TAMSULOSIN HYDROCHLORIDE 0.4 MG: 0.4 CAPSULE ORAL at 08:39

## 2022-08-22 RX ADMIN — Medication 5000 UNITS: at 08:38

## 2022-08-22 RX ADMIN — CETIRIZINE HYDROCHLORIDE 5 MG: 10 TABLET, FILM COATED ORAL at 08:38

## 2022-08-22 RX ADMIN — BUMETANIDE 2 MG: 0.25 INJECTION INTRAMUSCULAR; INTRAVENOUS at 08:40

## 2022-08-22 RX ADMIN — BETHANECHOL CHLORIDE 10 MG: 10 TABLET ORAL at 17:04

## 2022-08-22 RX ADMIN — IPRATROPIUM BROMIDE AND ALBUTEROL SULFATE 3 ML: .5; 3 SOLUTION RESPIRATORY (INHALATION) at 01:13

## 2022-08-22 RX ADMIN — AMOXICILLIN AND CLAVULANATE POTASSIUM 500 MG: 500; 125 TABLET, FILM COATED ORAL at 20:31

## 2022-08-22 RX ADMIN — SENNOSIDES AND DOCUSATE SODIUM 2 TABLET: 50; 8.6 TABLET ORAL at 08:38

## 2022-08-22 RX ADMIN — AMOXICILLIN AND CLAVULANATE POTASSIUM 500 MG: 500; 125 TABLET, FILM COATED ORAL at 08:45

## 2022-08-22 RX ADMIN — APIXABAN 5 MG: 5 TABLET, FILM COATED ORAL at 20:30

## 2022-08-22 RX ADMIN — INSULIN LISPRO 2 UNITS: 100 INJECTION, SOLUTION INTRAVENOUS; SUBCUTANEOUS at 17:05

## 2022-08-22 RX ADMIN — BETHANECHOL CHLORIDE 10 MG: 10 TABLET ORAL at 20:31

## 2022-08-22 RX ADMIN — Medication 10 ML: at 20:31

## 2022-08-22 RX ADMIN — APIXABAN 5 MG: 5 TABLET, FILM COATED ORAL at 08:38

## 2022-08-22 RX ADMIN — ASPIRIN 81 MG CHEWABLE TABLET 81 MG: 81 TABLET CHEWABLE at 08:39

## 2022-08-22 RX ADMIN — Medication 10 ML: at 08:41

## 2022-08-22 RX ADMIN — POLYETHYLENE GLYCOL 3350 17 G: 17 POWDER, FOR SOLUTION ORAL at 08:38

## 2022-08-22 RX ADMIN — INSULIN LISPRO 5 UNITS: 100 INJECTION, SOLUTION INTRAVENOUS; SUBCUTANEOUS at 08:39

## 2022-08-22 RX ADMIN — LEVOTHYROXINE SODIUM 50 MCG: 50 TABLET ORAL at 06:30

## 2022-08-22 RX ADMIN — INSULIN DETEMIR 15 UNITS: 100 INJECTION, SOLUTION SUBCUTANEOUS at 20:31

## 2022-08-22 RX ADMIN — SENNOSIDES AND DOCUSATE SODIUM 2 TABLET: 50; 8.6 TABLET ORAL at 20:31

## 2022-08-22 RX ADMIN — INSULIN LISPRO 2 UNITS: 100 INJECTION, SOLUTION INTRAVENOUS; SUBCUTANEOUS at 08:39

## 2022-08-22 NOTE — PLAN OF CARE
Goal Outcome Evaluation:  Plan of Care Reviewed With: patient, significant other A&Ox4 VSS. Paced on tele.Repositioned frequently for comfort by staff. BLE elevated off bed. Pain reported in left shoulder and controlled with PO tylenol. FC patent to BSD. Wife present at bedside throughout shift.

## 2022-08-22 NOTE — THERAPY TREATMENT NOTE
Patient Name: Jermaine Singh  : 1945    MRN: [id number]                              Today's Date: 2022       Admit Date: 2022    Visit Dx:     ICD-10-CM ICD-9-CM   1. Dislocation of left shoulder joint, initial encounter  S43.005A 831.00   2. Cellulitis of left foot  L03.116 682.7   3. 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  Preserved LV function   • Type 2 diabetes mellitus (HCC)   • Hypertension   • Hyperlipidemia   • Hypothyroidism (acquired)   • Vitamin D deficiency on Rx    • Serum Cr 1.32 on admission.  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 Au   • Dizziness   • Atherosclerosis of native artery of both lower extremities with intermittent claudication ([facility])   • SSS (sick sinus syndrome) ([facility])   • Dislocation of left shoulder joint, initial encounter   • Cellulitis in diabetic foot ([facility])   • Fall   • Lactic acidosis   • Proximal phalanx fracture of the second digit extending into the second metatarsal joint   • Acute renal failure (ARF) ([facility])   • Acute systolic heart failure ([facility])   • DVT, bilateral lower limbs ([facility])   • Acute respiratory failure with hypoxia ([facility])     Past Medical History:   Diagnosis Date   • Asthma    • Coronary artery disease    • Diabetes mellitus (HCC) 2000    started on inuslin 2018; started on po meds in ; checking blood sugars daily    • Disease of thyroid gland     po meds daily for hypothyroidism    • History of fracture as a child     rt leg- severe    • Hyperlipidemia    • Hypertension    • Hypothyroidism    • Peripheral neuropathy    • Peripheral vascular disease (HCC)     s/p angiogram -needs stent in left leg    • RBBB    • Right knee pain    • Vitamin D deficiency      Past Surgical History:   Procedure Laterality Date   • APPENDECTOMY     • CARDIAC CATHETERIZATION N/A 2019     Procedure: Left Heart Cath;  Surgeon: Cooper Apodaca MD;  Location:  [facility];  Service: Cardiology   • CARDIAC ELECTROPHYSIOLOGY PROCEDURE N/A 5/18/2022    Procedure: DEVICE IMPLANT;  Surgeon: Cooper Apodaca MD;  Location:  [facility];  Service: Cardiology;  Laterality: N/A;   • COLONOSCOPY     • CORONARY ARTERY BYPASS GRAFT N/A 3/22/2019    Procedure: MEDIAN STERNOTOMY, CORONARY ARTERY BYPASS GRAFT X3, UTILIZING THE LEFT INTERNAL MAMMARY ARTERY, EVH AND OPEN HARVEST OF THE RIGHT GREATER SAPHENOUS VEIN, EXPLORATION OF THE LEFT LEG;  Surgeon: Ap Au MD;  Location:  [facility];  Service: Cardiothoracic   • EYE SURGERY Bilateral     cataracts    • INTERVENTIONAL RADIOLOGY PROCEDURE N/A 7/29/2021    Procedure: Abdominal Aortagram with Runoff;  Surgeon: Jermaine Hernandez MD;  Location:  [facility];  Service: Cardiovascular;  Laterality: N/A;   • KNEE ARTHROSCOPY      right x 2, left x 1   • LACERATION REPAIR      right leg   • LEG SURGERY      2 for fracture of rt leg    • TONSILLECTOMY      Adnoidectomy   • TRANS METATARSAL AMPUTATION Left 8/2/2022    Procedure: GREAT TOE AMPUTATION LEFT;  Surgeon: Gopal Márquez MD;  Location:  [facility];  Service: Vascular;  Laterality: Left;      General Information     Row Name 08/22/22 1347          Physical Therapy Time and Intention    Document Type therapy note (daily note)  -     Mode of Treatment physical therapy  -     Row Name 08/22/22 4796          General Information    Patient Profile Reviewed yes  -     Existing Precautions/Restrictions fall;left;non-weight bearing;other (see comments);oxygen therapy device and L/min  LUE WBAT (no ABDuction or ER), LLE NWB with offloading shoe  -     Row Name 08/22/22 6596          Cognition    Orientation Status (Cognition) oriented to;person;place;situation;disoriented to;time  -     Row Name 08/22/22 2828          Safety Issues, Functional  Mobility    Impairments Affecting Function (Mobility) endurance/activity tolerance;pain;range of motion (ROM);strength;shortness of breath;cognition  -           User Key  (r) = Recorded By, (t) = Taken By, (c) = Cosigned By    Initials Name Provider Type     Thelma Rich, PT Physical Therapist               Mobility     Row Name 08/22/22 1349          Bed Mobility    Rolling Right Pendroy (Bed Mobility) moderate assist (50% patient effort);2 person assist  -     Supine-Sit P[place]roy (Bed Mobility) minimum assist (75% patient effort)  -     Assistive Device (Bed Mobility) bed rails;head of bed elevated;draw sheet  -     Comment, (Bed Mobility) Pt able to perform bed mobility with Mod A for rolling on R with assist to prevent rolling off the bed due to decreased safety awareness. Pt required assist for trunk management.  -[facility] Name 08/22/22 1349          Transfers    Comment, (Transfers) Pt performed STS with BUE support, Max A x2, R knee blocking and manual assist to prevent LLE WBing. Pt noncompliant with NWB status despite max education and manual assist. Mobility limited by safety concerns and weakness.  -     Row Name 08/22/22 1349          Bed-Chair Transfer    Bed-Chair Pendroy (Transfers) dependent (less than 25% patient effort);2 person assist  -     Assistive Device (Bed-Chair Transfers) lift device  -[facility] Name 08/22/22 1349          Sit-Stand Transfer    Sit-Stand P[place]roy (Transfers) verbal cues;maximum assist (25% patient effort);2 person assist  -     Assistive Device (Sit-Stand Transfers) walker, front-wheeled  -[facility] Name 08/22/22 1348          Mobility    Extremity Weight-bearing Status left upper extremity;left lower extremity  -     Left Upper Extremity (Weight-bearing Status) weight-bearing as tolerated (WBAT)  -     Left Lower Extremity (Weight-bearing Status) non weight-bearing (NWB)  -           User Key  (r) = Recorded By, (t) = Taken  By, (c) = Cosigned By    Initials Name Provider Type     Thelma Rich PT Physical Therapist               Obj/Interventions     Row Name 08/22/22 1803          Balance    Balance Assessment sitting static balance;sitting dynamic balance;sit to stand dynamic balance;standing static balance  -     Static Sitting Balance contact guard  -HP     Dynamic Sitting Balance minimal assist  -HP     Position, Sitting Balance sitting edge of bed  -     Static Standing Balance 2-person assist;maximum assist  -     Comment, Balance static sitting balance at EOB x5 minutes limited by fatigue. Multiple lateral and posterior LOB noted as pt fatigued.  -           User Key  (r) = Recorded By, (t) = Taken By, (c) = Cosigned By    Initials Name Provider Type     Thelma Rich PT Physical Therapist               Goals/Plan    No documentation.                Clinical Impression     [facility] Name 08/22/22 1804          Pain    Pretreatment Pain Rating 0/10 - no pain  -HP     Posttreatment Pain Rating 0/10 - no pain  -     Row Name 08/22/22 1804          Plan of Care Review    Plan of Care Reviewed With patient;daughter  -     Progress improving  -     Outcome Evaluation Pt demonstrated good participation with therapy and showed improvements in upright posture tolerance. Pt able to sit EOB with CGA/Min A x5 minutes. Pt performed STS with BUE support, offloading shoe donned,a and Max A x2. Pt demonstrated difficulty maintaining NWB status on LLE despite max education and manual assist. Activity limited by fatigue. Continue to recommend IRF at d/c for best functional outcome.  -     Row Name 08/22/22 1804          Therapy Assessment/Plan (PT)    Therapy Frequency (PT) daily  -     Row Name 08/22/22 1804          Vital Signs    Pre Systolic BP Rehab --  VSS  -HP     Pre Patient Position Supine  -HP     Intra Patient Position Standing  -HP     Post Patient Position Sitting  -     Row Name 08/22/22 1804           Positioning and Restraints    Pre-Treatment Position in bed  -HP     Post Treatment Position chair  -HP     In Chair notified nsg;reclined;sitting;call light within reach;encouraged to call for assist;exit alarm on;with family/caregiver;compression device;waffle cushion;on mechanical lift sling  -           User Key  (r) = Recorded By, (t) = Taken By, (c) = Cosigned By    Initials Name Provider Type    Thelma Kramer, SETH Physical Therapist               Outcome Measures     Row Name 08/22/22 1808 08/22/22 0800       How much help from another person do you currently need...    Turning from your back to your side while in flat bed without using bedrails? 3  - 3  -BC    Moving from lying on back to sitting on the side of a flat bed without bedrails? 2  - 3  -BC    Moving to and from a bed to a chair (including a wheelchair)? 1  - 3  -BC    Standing up from a chair using your arms (e.g., wheelchair, bedside chair)? 2  - 2  -BC    Climbing 3-5 steps with a railing? 1  - 2  -BC    To walk in hospital room? 1  - 2  -BC    AM-PAC 6 Clicks Score (PT) 10  - 15  -BC    Highest level of mobility 4 --> Transferred to chair/commode  - 4 --> Transferred to chair/commode  -BC    Row Name 08/22/22 1808 08/22/22 1417       Functional Assessment    Outcome Measure Options AM-PAC 6 Clicks Basic Mobility (PT)  - AM-PAC 6 Clicks Daily Activity (OT)  -AR          User Key  (r) = Recorded By, (t) = Taken By, (c) = Cosigned By    Initials Name Provider Type    Nathalia Nava OT Occupational Therapist    Yelena Gustafson, RN Registered Nurse    Thelma Kramer, PT Physical Therapist                             Physical Therapy Education                 Title: PT OT SLP Therapies (In Progress)     Topic: Physical Therapy (In Progress)     Point: Mobility training (In Progress)     Learning Progress Summary           Patient Acceptance, E,D, NR by  at 8/22/2022 1808   Family Acceptance, E,D, NR by  at  8/22/2022 1808      Show all documentation for this point (16)                 Point: Home exercise program (In Progress)     Learning Progress Summary           Patient Acceptance, E,D, NR by HP at 8/22/2022 1808   Family Acceptance, E,D, NR by HP at 8/22/2022 1808      Show all documentation for this point (16)                 Point: Body mechanics (In Progress)     Learning Progress Summary           Patient Acceptance, E,D, NR by HP at 8/22/2022 1808   Family Acceptance, E,D, NR by HP at 8/22/2022 1808      Show all documentation for this point (16)                 Point: Precautions (In Progress)     Learning Progress Summary           Patient Acceptance, E,D, NR by HP at 8/22/2022 1808   Family Acceptance, E,D, NR by HP at 8/22/2022 1808      Show all documentation for this point (16)                             User Key     Initials Effective Dates Name Provider Type Discipline     06/01/21 -  Thelma Rich, PT Physical Therapist PT              PT Recommendation and Plan  Planned Therapy Interventions (PT): balance training, bed mobility training, gait training, home exercise program, patient/family education, stretching, strengthening, stair training, transfer training, wheelchair management/propulsion training  Plan of Care Reviewed With: patient, daughter  Progress: improving  Outcome Evaluation: Pt demonstrated good participation with therapy and showed improvements in upright posture tolerance. Pt able to sit EOB with CGA/Min A x5 minutes. Pt performed STS with BUE support, offloading shoe donned,a and Max A x2. Pt demonstrated difficulty maintaining NWB status on LLE despite max education and manual assist. Activity limited by fatigue. Continue to recommend IRF at d/c for best functional outcome.     Time Calculation:    PT Charges     Row Name 08/22/22 1100             Time Calculation    Start Time 1100  -      PT Received On 08/22/22  -              Timed Charges    95490 - PT Therapeutic  Activity Minutes 24  -HP              Total Minutes    Timed Charges Total Minutes 24  -HP       Total Minutes 24  -HP            User Key  (r) = Recorded By, (t) = Taken By, (c) = Cosigned By    Initials Name Provider Type    HP Thelma Rich, SETH Physical Therapist              Therapy Charges for Today     Code Description Service Date Service Provider Modifiers Qty    [id number]  PT THERAPEUTIC ACT EA 15 MIN 8/22/2022 Thelma Rich, SETH GP 2          PT G-Codes  Outcome Measure Options: AM-PAC 6 Clicks Basic Mobility (PT)  AM-PAC 6 Clicks Score (PT): 10  AM-PAC 6 Clicks Score (OT): 13    Thelma Rich PT  8/22/2022

## 2022-08-22 NOTE — THERAPY TREATMENT NOTE
Patient Name: Jermaine Singh  : 1945    MRN: [id number]                              Today's Date: 2022       Admit Date: 2022    Visit Dx:     ICD-10-CM ICD-9-CM   1. Dislocation of left shoulder joint, initial encounter  S43.005A 831.00   2. Cellulitis of left foot  L03.116 682.7   3. 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  Preserved LV function   • Type 2 diabetes mellitus (HCC)   • Hypertension   • Hyperlipidemia   • Hypothyroidism (acquired)   • Vitamin D deficiency on Rx    • Serum Cr 1.32 on admission.  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 Au   • Dizziness   • Atherosclerosis of native artery of both lower extremities with intermittent claudication ([facility])   • SSS (sick sinus syndrome) ([facility])   • Dislocation of left shoulder joint, initial encounter   • Cellulitis in diabetic foot ([facility])   • Fall   • Lactic acidosis   • Proximal phalanx fracture of the second digit extending into the second metatarsal joint   • Acute renal failure (ARF) ([facility])   • Acute systolic heart failure ([facility])   • DVT, bilateral lower limbs ([facility])   • Acute respiratory failure with hypoxia ([facility])     Past Medical History:   Diagnosis Date   • Asthma    • Coronary artery disease    • Diabetes mellitus (HCC) 2000    started on in[place]lin 2018; started on po meds in ; checking blood sugars daily    • Disease of thyroid gland     po meds daily for hypothyroidism    • History of fracture as a child     rt leg- severe    • Hyperlipidemia    • Hypertension    • Hypothyroidism    • Peripheral neuropathy    • Peripheral vascular disease (HCC)     s/p angiogram -needs stent in left leg    • RBBB    • Right knee pain    • Vitamin D deficiency      Past Surgical History:   Procedure Laterality Date   • APPENDECTOMY     • CARDIAC CATHETERIZATION N/A 2019     Procedure: Left Heart Cath;  Surgeon: Cooper Apodaca MD;  Location:  [facility];  Service: Cardiology   • CARDIAC ELECTROPHYSIOLOGY PROCEDURE N/A 5/18/2022    Procedure: DEVICE IMPLANT;  Surgeon: Cooper Apodaca MD;  Location:  [facility];  Service: Cardiology;  Laterality: N/A;   • COLONOSCOPY     • CORONARY ARTERY BYPASS GRAFT N/A 3/22/2019    Procedure: MEDIAN STERNOTOMY, CORONARY ARTERY BYPASS GRAFT X3, UTILIZING THE LEFT INTERNAL MAMMARY ARTERY, EVH AND OPEN HARVEST OF THE RIGHT GREATER SAPHENOUS VEIN, EXPLORATION OF THE LEFT LEG;  Surgeon: Ap Au MD;  Location:  [facility];  Service: Cardiothoracic   • EYE SURGERY Bilateral     cataracts    • INTERVENTIONAL RADIOLOGY PROCEDURE N/A 7/29/2021    Procedure: Abdominal Aortagram with Runoff;  Surgeon: Jermaine Hernandez MD;  Location:  [facility];  Service: Cardiovascular;  Laterality: N/A;   • KNEE ARTHROSCOPY      right x 2, left x 1   • LACERATION REPAIR      right leg   • LEG SURGERY      2 for fracture of rt leg    • TONSILLECTOMY      Adnoidectomy   • TRANS METATARSAL AMPUTATION Left 8/2/2022    Procedure: GREAT TOE AMPUTATION LEFT;  Surgeon: Gopal Márquez MD;  Location:  [facility];  Service: Vascular;  Laterality: Left;      General Information     Row Name 08/22/22 1402          OT Time and Intention    Document Type therapy note (daily note)  -AR     Mode of Treatment occupational therapy  -AR     Row Name 08/22/22 1402          General Information    Existing Precautions/Restrictions fall;left;non-weight bearing;other (see comments);oxygen therapy device and L/min  WBAT LUE- no ABD or ER; NWB LLE with post-op shoe  -AR     Barriers to Rehab medically complex;previous functional deficit;cognitive status  -AR     Row Name 08/22/22 1402          Cognition    Orientation Status (Cognition) oriented x 4;verbal cues/prompts needed for orientation  -AR     Row Name 08/22/22 1402           Safety Issues, Functional Mobility    Safety Issues Affecting Function (Mobility) ability to follow commands;at risk behavior observed;awareness of need for assistance;impulsivity;insight into deficits/self-awareness;judgment;positioning of assistive device;problem-solving;safety precaution awareness;safety precautions follow-through/compliance;sequencing abilities;unable to maintain weight-bearing restrictions  -AR     Impairments Affecting Function (Mobility) endurance/activity tolerance;pain;range of motion (ROM);strength;shortness of breath;cognition;balance;postural/trunk control;sensation/sensory awareness  -AR           User Key  (r) = Recorded By, (t) = Taken By, (c) = Cosigned By    Initials Name Provider Type    Nathalia Nava OT Occupational Therapist                 Mobility/ADL's     Row Name 08/22/22 6471          Bed Mobility    Bed Mobility rolling right;supine-sit;scooting/bridging  -AR     Rolling Right [place] (Bed Mobility) minimum assist (75% patient effort);verbal cues;2 person assist  -AR     Scooting/Bridging [place] (Bed Mobility) contact guard;1 person assist;verbal cues  -AR     Supine-Sit [place] (Bed Mobility) minimum assist (75% patient effort);verbal cues  -AR     Assistive Device (Bed Mobility) bed rails;draw sheet;head of bed elevated  -AR     Comment, (Bed Mobility) Needs verbal and tactile cues to maintain NWB LLE. Pt noted to be soiled, completed post-toilet hygiene supine level. Pt with poor proprioceptive awareness, needs cues/assist to not roll off of bed in sidelying.  -AR     Row Name 08/22/22 1409          Transfers    Transfers sit-stand transfer;stand-sit transfer;bed-chair transfer  -AR     Comment, (Transfers) Pt completed sit-to-stand transition from EOB. Pt able to achieve 3/4 stance, however unable to maintain NWB LLE thus lifted to chair via overhead system.  -AR     Bed-Chair [place] (Transfers) dependent (less than 25% patient  effort);2 person assist  -AR     Assistive Device (Bed-Chair Transfers) lift device  -AR     Sit-Stand [place] (Transfers) verbal cues;maximum assist (25% patient effort);2 person assist  -AR     Stand-Sit [place] (Transfers) maximum assist (25% patient effort);2 person assist;verbal cues  -AR     Row Name 08/22/22 1404          Sit-Stand Transfer    Assistive Device (Sit-Stand Transfers) walker, front-wheeled  -AR     Comment, (Sit-Stand Transfer) Trailed with RW and HHA, both attempts to unable to maintain NWB LLE despite verbal cues and assist.  -AR     Row Name 08/22/22 1404          Stand-Sit Transfer    Assistive Device (Stand-Sit Transfers) walker, front-wheeled  -AR     Row Name 08/22/22 1404          Activities of Daily Living    BADL Assessment/Intervention upper body dressing;lower body dressing;grooming;toileting  -AR     Row Name 08/22/22 1404          Mobility    Extremity Weight-bearing Status left upper extremity;left lower extremity  -AR     Left Upper Extremity (Weight-bearing Status) weight-bearing as tolerated (WBAT)  -AR     Left Lower Extremity (Weight-bearing Status) non weight-bearing (NWB)   -AR     Row Name 08/22/22 1404          Upper Body Dressing Assessment/Training    [place] Level (Upper Body Dressing) don;doff;pajama/robe;moderate assist (50% patient effort)  -AR     Position (Upper Body Dressing) edge of bed sitting  -AR     Row Name 08/22/22 1404          Grooming Assessment/Training    [place] Level (Grooming) wash face, hands;moderate assist (50% patient effort)  -AR     Position (Grooming) edge of bed sitting  -AR     Row Name 08/22/22 1404          Toileting Assessment/Training    [place] Level (Toileting) perform perineal hygiene;adjust/manage clothing;dependent (less than 25% patient effort)  -AR     Position (Toileting) supine  -AR           User Key  (r) = Recorded By, (t) = Taken By, (c) = Cosigned By    Initials Name Provider Type    LISA Osborne,  Nathalia Munoz OT Occupational Therapist               Obj/Interventions     Row Name 08/22/22 1409          Shoulder (Therapeutic Exercise)    Shoulder AAROM (Therapeutic Exercise) bilateral;flexion;extension;supine;5 repetitions;10 repetitions  -AR     Row Name 08/22/22 1409          Elbow/Forearm (Therapeutic Exercise)    Elbow/Forearm (Therapeutic Exercise) AROM (active range of motion)  -AR     Elbow/Forearm AROM (Therapeutic Exercise) bilateral;flexion;extension;supine;15 repititions  -AR     Row Name 08/22/22 1409          Hand (Therapeutic Exercise)    Hand AROM/AAROM (Therapeutic Exercise) bilateral;AROM (active range of motion);finger flexion;finger extension;15 repititions  -AR     Row Name 08/22/22 1409          Motor Skills    Therapeutic Exercise shoulder;elbow/forearm;hand  -AR     Row Name 08/22/22 1409          Balance    Static Sitting Balance contact guard  -AR     Dynamic Sitting Balance minimal assist  -AR     Position, Sitting Balance sitting edge of bed;unsupported  -AR     Static Standing Balance maximum assist;2-person assist;verbal cues  -AR     Position/Device Used, Standing Balance supported  -AR     Comment, Balance Pt with frequent lateral, posterior and anterior LOB episodes requiring correction from therapist to achieve midline stance  -AR           User Key  (r) = Recorded By, (t) = Taken By, (c) = Cosigned By    Initials Name Provider Type    Nathalia Nava, ANG Occupational Therapist               Goals/Plan    No documentation.                Clinical Impression     Row Name 08/22/22 1411          Pain Assessment    Pretreatment Pain Rating 0/10 - no pain  -AR     Posttreatment Pain Rating 0/10 - no pain  -AR     Row Name 08/22/22 1411          Plan of Care Review    Plan of Care Reviewed With patient;daughter  -AR     Progress improving  -AR     Outcome Evaluation Pt completed bed mobility with min assist and sat EOB x 10 minues with mod assist-CGA. Pt with frequent  anterior, posterior and lateral LOB instances requiring assist from therapist to achieve midline position. He attempted two sit-to-stand transitions and required max assist x2 to achieve 3/4 stance. He completed UB dressing with mod assist. During bed mobility and transfer training, he is unable to maintain NWB LLE despite maximum verbal and tactiles cues and has poor carry-over of teaching to maintain NWB status. Pt on RA, no desaturation noted and BP stable. Recommend IPR.  -AR     Row Name 08/22/22 1411          Therapy Plan Review/Discharge Plan (OT)    Anticipated Discharge Disposition (OT) inpatient rehabilitation facility  -AR     Row Name 08/22/22 1411          Vital Signs    Pre Systolic BP Rehab 107  -AR     Pre Treatment Diastolic BP 73  -AR     Intra Systolic BP Rehab 103  -AR     Intra Treatment Diastolic BP 79  -AR     Pre SpO2 (%) 92  -AR     O2 Delivery Pre Treatment room air  -AR     Intra SpO2 (%) 94  -AR     O2 Delivery Intra Treatment room air  -AR     Post SpO2 (%) 91  -AR     O2 Delivery Post Treatment room air  -AR     Pre Patient Position Supine  -AR     Intra Patient Position Standing  -AR     Post Patient Position Sitting  -AR     Row Name 08/22/22 1411          Positioning and Restraints    Pre-Treatment Position in bed  -AR     Post Treatment Position chair  -AR     In Chair notified nsg;reclined;call light within reach;encouraged to call for assist;exit alarm on;with family/caregiver;waffle cushion;on mechanical lift sling;legs elevated;with nsg  L post-op shoe  -AR           User Key  (r) = Recorded By, (t) = Taken By, (c) = Cosigned By    Initials Name Provider Type    Nathalia Nava, OT Occupational Therapist               Outcome Measures     Row Name 08/22/22 1417          How much help from another is currently needed...    Putting on and taking off regular lower body clothing? 2  -AR     Bathing (including washing, rinsing, and drying) 2  -AR     Toileting (which includes  using toilet bed pan or urinal) 1  -AR     Putting on and taking off regular upper body clothing 2  -AR     Taking care of personal grooming (such as brushing teeth) 3  -AR     Eating meals 3  -AR     AM-PAC 6 Clicks Score (OT) 13  -AR     Row Name 08/22/22 0800          How much help from another person do you currently need...    Turning from your back to your side while in flat bed without using bedrails? 3  -BC     Moving from lying on back to sitting on the side of a flat bed without bedrails? 3  -BC     Moving to and from a bed to a chair (including a wheelchair)? 3  -BC     Standing up from a chair using your arms (e.g., wheelchair, bedside chair)? 2  -BC     Climbing 3-5 steps with a railing? 2  -BC     To walk in hospital room? 2  -BC     AM-PAC 6 Clicks Score (PT) 15  -BC     Highest level of mobility 4 --> Transferred to chair/commode  -BC     Row Name 08/22/22 1417          Functional Assessment    Outcome Measure Options AM-PAC 6 Clicks Daily Activity (OT)  -AR           User Key  (r) = Recorded By, (t) = Taken By, (c) = Cosigned By    Initials Name Provider Type    Nathalia Nava OT Occupational Therapist    BC eYlena Delatorre, RN Registered Nurse                Occupational Therapy Education                 Title: PT OT SLP Therapies (In Progress)     Topic: Occupational Therapy (In Progress)     Point: ADL training (In Progress)     Description:   Instruct learner(s) on proper safety adaptation and remediation techniques during self care or transfers.   Instruct in proper use of assistive devices.              Learning Progress Summary           Patient Eager, E,TB,D, NR by AR at 8/22/2022 1418   Family Eager, E,TB,D, NR by AR at 8/22/2022 1418      Show all documentation for this point (17)                 Point: Home exercise program (In Progress)     Description:   Instruct learner(s) on appropriate technique for monitoring, assisting and/or progressing therapeutic exercises/activities.               Learning Progress Summary           Patient Eager, E,TB,D, NR by AR at 8/22/2022 1418   Family Eager, E,TB,D, NR by AR at 8/22/2022 1418      Show all documentation for this point (18)                 Point: Precautions (In Progress)     Description:   Instruct learner(s) on prescribed precautions during self-care and functional transfers.              Learning Progress Summary           Patient Eager, E,TB,D, NR by AR at 8/22/2022 1418   Family Eager, E,TB,D, NR by AR at 8/22/2022 1418      Show all documentation for this point (18)                 Point: Body mechanics (In Progress)     Description:   Instruct learner(s) on proper positioning and spine alignment during self-care, functional mobility activities and/or exercises.              Learning Progress Summary           Patient Eager, E,TB,D, NR by AR at 8/22/2022 1418   Family Eager, E,TB,D, NR by AR at 8/22/2022 1418      Show all documentation for this point (14)                             User Key     Initials Effective Dates Name Provider Type Discipline    AR 06/16/21 -  Nathalia Osborne, OT Occupational Therapist OT              OT Recommendation and Plan     Plan of Care Review  Plan of Care Reviewed With: patient, daughter  Progress: improving  Outcome Evaluation: Pt completed bed mobility with min assist and sat EOB x 10 minues with mod assist-CGA. Pt with frequent anterior, posterior and lateral LOB instances requiring assist from therapist to achieve midline position. He attempted two sit-to-stand transitions and required max assist x2 to achieve 3/4 stance. He completed UB dressing with mod assist. During bed mobility and transfer training, he is unable to maintain NWB LLE despite maximum verbal and tactiles cues and has poor carry-over of teaching to maintain NWB status. Pt on RA, no desaturation noted and BP stable. Recommend IPR.     Time Calculation:    Time Calculation- OT     Row Name 08/22/22 1418             Time  Calculation- OT    OT Start Time 1047  -AR      OT Received On 08/22/22  -AR      OT Goal Re-Cert Due Date 08/25/22  -AR              Timed Charges    84819 - OT Therapeutic Activity Minutes 11  -AR      70271 - OT Self Care/Mgmt Minutes 19  -AR              Total Minutes    Timed Charges Total Minutes 30  -AR       Total Minutes 30  -AR            User Key  (r) = Recorded By, (t) = Taken By, (c) = Cosigned By    Initials Name Provider Type    AR Nathalia Osborne OT Occupational Therapist              Therapy Charges for Today     Code Description Service Date Service Provider Modifiers Qty    55366262001 HC OT SELF CARE/MGMT/TRAIN EA 15 MIN 8/22/2022 Nathalia Osborne OT GO 1    88285628657 HC OT THERAPEUTIC ACT EA 15 MIN 8/22/2022 Nathalia Osborne OT GO 1               Nathalia Osborne OT  8/22/2022

## 2022-08-22 NOTE — PROGRESS NOTES
"  [facility]  PROGRESS NOTE    Date of Admission: 7/27/2022  Date of Service: 08/22/22    Primary Care Physician: Farooq Painter MD    Chief Complaint: Following for HTN, Acute CHF    Subjective    Did well over the wknd.  Continues to have improved respiratory status, improved demeanor.  Cr continues to improved with diuresis.    Hopeful about possible DC to rehab this week.    Objective   Vitals: /79   Pulse 67   Temp 97.6 °F (36.4 °C) (Axillary)   Resp 16   Ht 190.5 cm (75\")   Wt 110 kg (242 lb)   SpO2 94%   BMI 30.25 kg/m²       Physical Exam:  GENERAL: Alert, cooperative, in no acute distress.   HEENT: Normocephalic, no jugular venous distention  HEART: Regular rhythm, normal rate, and no murmurs, gallops, or rubs.   LUNGS: CTA all lung fields with coarse lung sounds, On 2-3L NC. Improved.  ABDOMEN: Soft, bowel sounds present, nontender   NEUROLOGIC: No focal abnormalities involving strength or sensation are noted.   EXTREMITIES: No clubbing, cyanosis.  Trace edema noted.     Results:  Results from last 7 days   Lab Units 08/21/22  0646 08/20/22  0639 08/19/22  0333   WBC 10*3/mm3 9.20 9.07 11.20*   HEMOGLOBIN g/dL 10.1* 9.8* 10.7*   HEMATOCRIT % 30.2* 28.8* 31.9*   PLATELETS 10*3/mm3 163 157 160     Results from last 7 days   Lab Units 08/22/22  0656 08/21/22  0646 08/20/22  0639   SODIUM mmol/L 138 137 136   POTASSIUM mmol/L 4.7 4.3 4.4   CHLORIDE mmol/L 98 96* 96*   CO2 mmol/L 32.0* 27.0 30.0*   BUN mg/dL 73* 74* 86*   CREATININE mg/dL 2.64* 2.71* 2.97*   GLUCOSE mg/dL 194* 215* 139*      Lab Results   Component Value Date    CHOL 180 08/04/2022    TRIG 160 (H) 08/04/2022    HDL 39 (L) 08/04/2022     (H) 08/04/2022    AST 12 08/13/2022    ALT 13 08/13/2022                       Intake/Output Summary (Last 24 hours) at 8/22/2022 0857  Last data filed at 8/22/2022 0715  Gross per 24 hour   Intake 240 ml   Output 1450 ml   Net -1210 ml       I " personally reviewed the patient's EKG/Telemetry data    Radiology Data:   CXR 8/14/22:  IMPRESSION:     Increased patchy bibasilar opacities     Cardiomegaly and pulmonary vascular congestion.    Current Medications:  amLODIPine, 10 mg, Oral, Daily  amoxicillin-clavulanate, 1 tablet, Oral, Q12H  apixaban, 5 mg, Oral, Q12H  aspirin, 81 mg, Oral, Daily  bethanechol, 10 mg, Oral, TID  bumetanide, 2 mg, Intravenous, Daily  cetirizine, 5 mg, Oral, Daily  insulin detemir, 15 Units, Subcutaneous, Nightly  insulin lispro, 0-9 Units, Subcutaneous, TID AC  Insulin Lispro, 5 Units, Subcutaneous, TID With Meals  levothyroxine, 50 mcg, Oral, Q AM  metoprolol succinate XL, 50 mg, Oral, Q24H  polyethylene glycol, 17 g, Oral, Daily  senna-docusate sodium, 2 tablet, Oral, BID  sodium chloride, 10 mL, Intravenous, Q12H  sodium chloride, 10 mL, Intravenous, Q12H  tamsulosin, 0.4 mg, Oral, Daily  vitamin D3, 5,000 Units, Oral, Daily               ASSESSMENT:  PVDz with remote Soy iliac stenting, good collateral flow.  Osteomyelitis left Great toe  CAD  HTN  [place]  Acute CHF  Hypoxia  DMII  BLE DVTs      PLAN:  -  POD 20 p Left grt toe transmetatarsal amputation.  Continue PT/OT, antibiotics per ID.  Now on Ampicillin-sulbactam.  -  Mildly elevated troponin from demand ischemia with acute CHF. No acute EKG changes.  Continue Beta Blocker, CCB.  Repeat echo, EF likely closer to 40%.  -   Cr. 2.64 today.  Nephrology following and managing diuresis.  Renal ultrasound shows no obstruction and NO WILMA by ultrasound.  Appreciate their assistance.  - Eliquis per DVT protocol.  -  Improvement seen in respiratory status with diuresis. Improved mentation.  Hopefully able to DC to rehab sometime this week.        Megan Shukla PA-C working with Dr. Cooper Apodaca  08:58AM EDT  08/22/2022

## 2022-08-22 NOTE — PLAN OF CARE
Goal Outcome Evaluation:  Plan of Care Reviewed With: patient, daughter        Progress: improving  Outcome Evaluation: Pt completed bed mobility with min assist and sat EOB x 10 minues with mod assist-CGA. Pt with frequent anterior, posterior and lateral LOB instances requiring assist from therapist to achieve midline position. He attempted two sit-to-stand transitions and required max assist x2 to achieve 3/4 stance. He completed UB dressing with mod assist. During bed mobility and transfer training, he is unable to maintain NWB LLE despite maximum verbal and tactiles cues and has poor carry-over of teaching to maintain NWB status. Pt on RA, no desaturation noted and BP stable. Recommend IPR.

## 2022-08-22 NOTE — PROGRESS NOTES
Jermaine JIMENES Francisco  1945  [id number]    Date of Consult: 7/28/22    Admission Date: 7/27/2022      Requesting Provider: No ref. provider found  Evaluating Physician: Sage Braga MD    Reason for Consultation: Evaluation of toe wound    History of present illness:    Patient is a 77 y.o. male with coronary disease history of CABG diabetes mellitus type 2 hypothyroidism hypertension hyperlipidemia prevascular disease presents the emergency room after having a fall and injuring left shoulder patient tripped while walking his dog.  Coincidentally patient wears an orthotic shoe as recommended by a podiatrist has been on oral doxycycline for a left toe wound we are asked to evaluate this patient to start on broad-spectrum antibiotics occluding vancomycin and Zosyn.  X-ray of left foot showed soft tissue swelling in the forefoot without obvious fracture or osteomyelitis.    Has history of pacemaker    History of vascular stent placed in right leg.    7/29/2022; is having bone scan today denies fevers rash sore throat or diarrhea    7/30/22:  Bone scan compatible with osteomyelitis distal phalanx of first digit, acute fracture 2nd digit.  Afebrile. Blood cultures no growth to date.     7/31/22:  Afebrile.   Dr. Márquez consulting Dr. Apodaca for vascular studies left lower extremity.   No n/v/d.  Complains of shoulder pain.     8/2/22; doing well; had surgery today; toe amputation, no fever, rash, sore throat    8/3/22; no events overnight; resting quietly no events overnight    8/4/22; doing well; no events overnight; no fever, rash, sore throat;   8/5/22; no events overnight; no fever, rash, sore throat, no diarrhea    8/8/22; no events overnight; no fever, rash, sore throat    8/9/22; no events overnight; no fever, rash, sore throat no diarrhea  8/11/22; patient on hfnc, followed by cardiology being worked up for elevated troponin, pobnp.  Resting quietly    8/12/22; on hfnc, getting diuresed, no fever, rahs, sore  throat has nausea, reports chest pain    8/14/22: on 4L O2.  Denies f/c, sore throat, n/v/d, rashes.   8/15/22: on 4.5 L O2.  Still with right upper chest wall pain.  Denies f/c, sob, n/v/d, rashes. Just back from [facility].      8/17/2022 patient's is on 2.5 L of oxygen has no complaints denies fevers rash sore throat family is by bedside    8/19/22 on oxygen by nasal cannula resting comfortably in bed no complaints denies fevers or sore throat    8/22/22; doing well; no events overnight; no fever, rash, sore throat off o2 by nasal cannula    Past Medical History:   Diagnosis Date   • Asthma    • Coronary artery disease    • Diabetes mellitus (HCC) 2000    started on inuslin 12/2018; started on po meds in 2000; checking blood sugars daily    • Disease of thyroid gland     po meds daily for hypothyroidism    • History of fracture as a child     rt leg- severe    • Hyperlipidemia    • Hypertension    • Hypothyroidism    • Peripheral neuropathy    • Peripheral vascular disease (HCC)     s/p angiogram 2/19-needs stent in left leg    • RBBB    • Right knee pain    • Vitamin D deficiency        Past Surgical History:   Procedure Laterality Date   • APPENDECTOMY     • CARDIAC CATHETERIZATION N/A 2/14/2019    Procedure: Left Heart Cath;  Surgeon: Cooper Apodaca MD;  Location:  [facility];  Service: Cardiology   • CARDIAC ELECTROPHYSIOLOGY PROCEDURE N/A 5/18/2022    Procedure: DEVICE IMPLANT;  Surgeon: Cooper Apodaac MD;  Location:  [facility];  Service: Cardiology;  Laterality: N/A;   • COLONOSCOPY     • CORONARY ARTERY BYPASS GRAFT N/A 3/22/2019    Procedure: MEDIAN STERNOTOMY, CORONARY ARTERY BYPASS GRAFT X3, UTILIZING THE LEFT INTERNAL MAMMARY ARTERY, EVH AND OPEN HARVEST OF THE RIGHT GREATER SAPHENOUS VEIN, EXPLORATION OF THE LEFT LEG;  Surgeon: Ap Au MD;  Location: [facility] OR;  Service: Cardiothoracic   • EYE SURGERY Bilateral     cataracts    •  INTERVENTIONAL RADIOLOGY PROCEDURE N/A 7/29/2021    Procedure: Abdominal Aortagram with Runoff;  Surgeon: Jermaine Hernandez MD;  Location:  [facility];  Service: Cardiovascular;  Laterality: N/A;   • KNEE ARTHROSCOPY      right x 2, left x 1   • LACERATION REPAIR      right leg   • LEG SURGERY      2 for fracture of rt leg    • TONSILLECTOMY      Adnoidectomy   • TRANS METATARSAL AMPUTATION Left 8/2/2022    Procedure: GREAT TOE AMPUTATION LEFT;  Surgeon: Gopal Márquez MD;  Location:  [facility] OR;  Service: Vascular;  Laterality: Left;       Family History   Problem Relation Age of Onset   • Coronary artery disease Mother    • Diabetes Mother    • Cancer Father        Social History     Socioeconomic History   • Marital status:    • Number of children: 2   Tobacco Use   • Smoking status: Never Smoker   • Smokeless tobacco: Never Used   Substance and Sexual Activity   • Alcohol use: No   • Drug use: No   • Sexual activity: Defer       No Known Allergies      Medication:    Current Facility-Administered Medications:   •  acetaminophen (TYLENOL) tablet 650 mg, 650 mg, Oral, Q4H PRN, 650 mg at 08/21/22 2043 **OR** acetaminophen (TYLENOL) 160 MG/5ML solution 650 mg, 650 mg, Oral, Q4H PRN, 650 mg at 08/20/22 2155 **OR** acetaminophen (TYLENOL) suppository 650 mg, 650 mg, Rectal, Q4H PRN, Nishant Allan PA  •  amLODIPine (NORVASC) tablet 10 mg, 10 mg, Oral, Daily, Nishant Allan PA, 10 mg at 08/19/22 0923  •  amoxicillin-clavulanate (AUGMENTIN) 500-125 MG per tablet 500 mg, 1 tablet, Oral, Q12H, Clifford Tavares [facility], 500 mg at 08/22/22 0845  •  [COMPLETED] apixaban (ELIQUIS) tablet 10 mg, 10 mg, Oral, Q12H, 10 mg at 08/20/22 2156 **FOLLOWED BY** apixaban (ELIQUIS) tablet 5 mg, 5 mg, Oral, Q12H, Reilly Thomas MD, 5 mg at 08/22/22 0838  •  aspirin chewable tablet 81 mg, 81 mg, Oral, Daily, Nishant Allan PA, 81 mg at 08/22/22 0839  •  benzocaine-menthol (CEPACOL) lozenge 1 lozenge, 1 lozenge,  Mouth/Throat, Q2H PRN, Luz Elena Batista DO, 1 lozenge at 08/18/22 1811  •  bethanechol (URECHOLINE) tablet 10 mg, 10 mg, Oral, TID, Luz Elena Batista DO, 10 mg at 08/22/22 0845  •  bisacodyl (DULCOLAX) suppository 10 mg, 10 mg, Rectal, Daily PRN, Nishant Allan PA, 10 mg at 08/12/22 2041  •  bumetanide (BUMEX) injection 2 mg, 2 mg, Intravenous, Daily, Blake Tilley MD, 2 mg at 08/22/22 0840  •  calcium carbonate (TUMS) chewable tablet 500 mg (200 mg elemental), 2 tablet, Oral, TID PRN, Hayder Vogel MD, 2 tablet at 08/17/22 1642  •  cetirizine (zyrTEC) tablet 5 mg, 5 mg, Oral, Daily, Nishant Allan PA, 5 mg at 08/22/22 0838  •  dextrose (D50W) (25 g/50 mL) IV injection 25 g, 25 g, Intravenous, Q15 Min PRN, Nishant Allan PA  •  dextrose (GLUTOSE) oral gel 15 g, 15 g, Oral, Q15 Min PRN, Nishant Allan PA, 15 g at 08/18/22 0526  •  glucagon (human recombinant) (GLUCAGEN DIAGNOSTIC) injection 1 mg, 1 mg, Intramuscular, Q15 Min PRN, Nishant Allan PA  •  HYDROcodone-acetaminophen (NORCO) 5-325 MG per tablet 1 tablet, 1 tablet, Oral, Q4H PRN, Peter Braga DO, 1 tablet at 08/15/22 0733  •  insulin detemir (LEVEMIR) injection 15 Units, 15 Units, Subcutaneous, Nightly, Luz Elena Batista DO, 15 Units at 08/21/22 2037  •  Insulin Lispro (humaLOG) injection 0-9 Units, 0-9 Units, Subcutaneous, TID AC, Hayder Vogel MD, 2 Units at 08/22/22 0839  •  Insulin Lispro (humaLOG) injection 5 Units, 5 Units, Subcutaneous, TID With Meals, Luz Elena Batista, , 5 Units at 08/22/22 0839  •  ipratropium-albuterol (DUO-NEB) nebulizer solution 3 mL, 3 mL, Nebulization, Q4H PRN, Eliana Hightower, APRN, 3 mL at 08/22/22 0113  •  levothyroxine (SYNTHROID, LEVOTHROID) tablet 50 mcg, 50 mcg, Oral, Q AM, Nishant Allan PA, 50 mcg at 08/22/22 0630  •  metoprolol succinate XL (TOPROL-XL) 24 hr tablet 50 mg, 50 mg, Oral, Q24H, Nishant Allan PA, 50 mg at 08/20/22 0846  •  nitroglycerin (NITROSTAT) SL tablet 0.4 mg,  0.4 mg, Sublingual, Q5 Min PRN, Reilly Thomas MD, 0.4 mg at 08/13/22 2206  •  ondansetron (ZOFRAN) tablet 4 mg, 4 mg, Oral, Q6H PRN **OR** ondansetron (ZOFRAN) injection 4 mg, 4 mg, Intravenous, Q6H PRN, LockNishant boyd, PA, 4 mg at 08/10/22 1607  •  phenol (CHLORASEPTIC) 1.4 % liquid 1 spray, 1 spray, Mouth/Throat, Q2H PRN, Megan Shukla PA, 1 spray at 08/04/22 1610  •  polyethylene glycol (MIRALAX) packet 17 g, 17 g, Oral, Daily, Rajani Farr DO, 17 g at 08/22/22 0838  •  prochlorperazine (COMPAZINE) injection 5 mg, 5 mg, Intravenous, Q6H PRN, Rajani Farr DO, 5 mg at 08/13/22 1955  •  sennosides-docusate (PERICOLACE) 8.6-50 MG per tablet 2 tablet, 2 tablet, Oral, BID PRN, Nishant Allan PA, 2 tablet at 08/09/22 1402  •  sennosides-docusate (PERICOLACE) 8.6-50 MG per tablet 2 tablet, 2 tablet, Oral, BID, Reilly Thomas MD, 2 tablet at 08/22/22 0838  •  simethicone (MYLICON) chewable tablet 80 mg, 80 mg, Oral, 4x Daily PRN, Daphne Lal MD  •  [COMPLETED] Insert peripheral IV, , , Once **AND** sodium chloride 0.9 % flush 10 mL, 10 mL, Intravenous, PRN, LockNishant boyd, PA, 10 mL at 08/17/22 0415  •  sodium chloride 0.9 % flush 10 mL, 10 mL, Intravenous, Q12H, LocklaNishant francois, PA, 10 mL at 08/20/22 2030  •  sodium chloride 0.9 % flush 10 mL, 10 mL, Intravenous, PRN, Nishant Allan, PA  •  sodium chloride 0.9 % flush 10 mL, 10 mL, Intravenous, Q12H, Rajani Farr DO, 10 mL at 08/22/22 0841  •  sodium chloride 0.9 % flush 10 mL, 10 mL, Intravenous, PRN, Rajani Farr DO, 10 mL at 08/16/22 1121  •  tamsulosin (FLOMAX) 24 hr capsule 0.4 mg, 0.4 mg, Oral, Daily, Luz Elena Batista DO, 0.4 mg at 08/22/22 0839  •  vitamin D3 capsule 5,000 Units, 5,000 Units, Oral, Daily, Nishant Allan PA, 5,000 Units at 08/22/22 0838    Facility-Administered Medications Ordered in Other Encounters:   •  Chlorhexidine Gluconate Cloth 2 % pads 1 application, 1 application, Topical, Q12H PRN,  Mohan Arauz PA    Antibiotics:  Anti-Infectives (From admission, onward)    Ordered     Dose/Rate Route Frequency Start Stop    22 1219  amoxicillin-clavulanate (AUGMENTIN) 500-125 MG per tablet 500 mg        Ordering Provider: Clifford Tavares RPH    1 tablet Oral Every 12 Hours Scheduled 22 1600 22 2359    22 1550  cefepime (MAXIPIME) 2 g/100 mL 0.9% NS (mbp)        Ordering Provider: Sage Braga MD    2 g  200 mL/hr over 30 Minutes Intravenous Once 22 1645 22 1640    22 1111  vancomycin 1500 mg/500 mL 0.9% NS IVPB ([facility])        Ordering Provider: Nishant Allan PA    15 mg/kg × 98 kg Intravenous Once 22 2200 22 2130    22 1209  vancomycin in dextrose 5% 150 mL (VANCOCIN) IVPB 750 mg        Ordering Provider: Miguel Bray RPH    750 mg Intravenous Every 12 Hours 22 2200 22 0934    22 1209  vancomycin in dextrose 5% 150 mL (VANCOCIN) IVPB 750 mg        Ordering Provider: Miguel Bray RPH    750 mg  over 60 Minutes Intravenous Every 12 Hours 22 1300 22 1422    22 0014  piperacillin-tazobactam (ZOSYN) 3.375 g in iso-osmotic dextrose 50 ml (premix)        Ordering Provider: Eliana Hightower APRN    3.375 g  over 4 Hours Intravenous Every 8 Hours 22 0800 22 0303    22 2208  piperacillin-tazobactam (ZOSYN) 3.375 g in iso-osmotic dextrose 50 ml (premix)        Ordering Provider: Donny Hightower APRN    3.375 g  over 30 Minutes Intravenous Once 22 2210 22 0320    22 2208  vancomycin 2000 mg/500 mL 0.9% NS IVPB ([facility])        Ordering Provider: Donny Hightower APRN    20 mg/kg × 98 kg Intravenous Once 22 2210 22 0040            Review of Systems:  See HPI      Physical Exam:   Vital Signs  Temp (24hrs), Av.8 °F (36.6 °C), Min:97.5 °F (36.4 °C), Max:98.2 °F (36.8 °C)    Temp  Min: 97.5 °F (36.4 °C)  Max: 98.2 °F (36.8 °C)  BP  Min: 92/69  Max:  116/79  Pulse  Min: 62  Max: 68  Resp  Min: 16  Max: 18  SpO2  Min: 92 %  Max: 98 %    GENERAL: awake, alert, no distress  HEENT: Normocephalic, atraumatic.  PERRL. EOMI. No conjunctival injection. No icterus.   HEART: RRR; No murmur,  LUNGS: Clear to auscultation bilaterally   ABDOMEN: Soft, nontender,   :  Without Fonseca catheter.  MSK: Left foot Inspected the amputation site is clean dry and intact surrounding air  SKIN: Warm and dry without cutaneous eruptions on Inspection/palpation.        Laboratory Data    Results from last 7 days   Lab Units 08/21/22  0646 08/20/22  0639 08/19/22  0333   WBC 10*3/mm3 9.20 9.07 11.20*   HEMOGLOBIN g/dL 10.1* 9.8* 10.7*   HEMATOCRIT % 30.2* 28.8* 31.9*   PLATELETS 10*3/mm3 163 157 160     Results from last 7 days   Lab Units 08/22/22  0656   SODIUM mmol/L 138   POTASSIUM mmol/L 4.7   CHLORIDE mmol/L 98   CO2 mmol/L 32.0*   BUN mg/dL 73*   CREATININE mg/dL 2.64*   GLUCOSE mg/dL 194*   CALCIUM mg/dL 8.4*                             Estimated Creatinine Clearance: 31.4 mL/min (A) (by C-G formula based on SCr of 2.64 mg/dL (H)).      Microbiology:  Microbiology Results (last 10 days)     Procedure Component Value - Date/Time    Eosinophil Smear - Urine, Urine, Clean Catch [[id number]]  (Normal) Collected: 08/14/22 1818    Lab Status: Final result Specimen: Urine, Clean Catch Updated: 08/14/22 2101     Eosinophil Smear 0 % EOS/100 Cells     Narrative:      No eosinophil seen                Radiology:  Imaging Results (Last 72 Hours)     ** No results found for the last 72 hours. **        Estimated Creatinine Clearance: 31.4 mL/min (A) (by C-G formula based on SCr of 2.64 mg/dL (H)).      Impression:   Leukocytosis with neutrophilia, improved.   Lactic acidosis  Left great toe wound- osteomyelitis by bone scan- amputation recommended by Dr. Márquez   Status post fall  Diabetes mellitus type 2  Left shoulder dislocation  Probable peripheral vascular disease  Acute renal failure,  ongoing.   Elevated proBNP/difficulty diuresis given renal failure.  Right upper chest wall pain, ongoing.     PLAN/RECOMMENDATIONS:     cont unasyn okay to reduce dose to every 12 hours given elevation of creatinine and     Plan was to continue IV antibiotic for 6 weeks from 8/2/22  however PICC line was removed by patient accidentally; given  patient's worsening kidney function and renal failure I would try to avoid a repeat picc line.    Left foot wound looks clinically well    Given that toe was amputated and I suspect good surgical margins were obtained I am willing to treat with oral Augmentin to complete a 6 week course from 8/2/22 (9/13/22)        Complicated case  Sage Braga MD  8/22/2022  14:11 EDT

## 2022-08-22 NOTE — PROGRESS NOTES
"   LOS: 26 days    Patient Care Team:  Farooq Painter MD as PCP - General (Family Medicine)  Jermaine Hernandez MD as Consulting Physician (Cardiology)    Chief Complaint:   77-year-old male  left foot to infection has been treated with antibiotic.  Initial creatinine has been within acceptable range, patient received IV vancomycin on starting 7/27/2022, was continued until 8/4/2022 at that time the creatinine started to go up to 2.02 patient's antibiotic was changed, taken off the vancomycin at that time creatinine started to get improved 1.8 slowly over the last 3 days creatinine has been slowly creeping up.  Patient has a past medical history of CAD, CABG, hyperlipidemia, diabetes type 2, hypertension, hypothyroidism, peripheral vascular disease, sick sinus syndrome with pacemaker placement. creatinine 2.07 BUN of 51, albumin 2.9, white count 14.2, platelets 209 normal. Patient is on ampicillin IV, and IV Solu-Medrol 60 mg.  Subjective     Interval History:   Renal function improving. Resp status improving. Mentation is improving.    Review of Systems:   Complain generalized weakness other than that all other systems are negative.    Objective     Vital Sign Min/Max for last 24 hours  Temp  Min: 97.5 °F (36.4 °C)  Max: 98.2 °F (36.8 °C)   BP  Min: 92/69  Max: 116/79   Pulse  Min: 62  Max: 68   Resp  Min: 16  Max: 18   SpO2  Min: 92 %  Max: 98 %   Flow (L/min)  Min: 4  Max: 4   No data recorded     Flowsheet Rows    Flowsheet Row First Filed Value   Admission Height 190.5 cm (75\") Documented at 07/27/2022 1848   Admission Weight 98 kg (216 lb) Documented at 07/27/2022 1848          I/O this shift:  In: 360 [P.O.:360]  Out: 1150 [Urine:1150]  I/O last 3 completed shifts:  In: 240 [P.O.:240]  Out: 1900 [Urine:1900]    Physical Exam:  General Appearance: Awake alert oriented  male sitting comfortably in chair.  Eyes: PER, EOMI.  Neck: Supple no JVD.  Lungs: Clear auscultation, no rales rhonchi's, equal " chest movement, nonlabored.  Heart: No gallop, murmur, rub, RRR.  Abdomen: Soft, nontender, positive bowel sounds, no organomegaly.  Extremities: No edema, no cyanosis.  Left toe in bandage  Neuro: No focal deficit, moving all extremities, alert oriented X 3   no suprapubic fullness or tenderness  WBC WBC   Date Value Ref Range Status   08/21/2022 9.20 3.40 - 10.80 10*3/mm3 Final   08/20/2022 9.07 3.40 - 10.80 10*3/mm3 Final      HGB Hemoglobin   Date Value Ref Range Status   08/21/2022 10.1 (L) 13.0 - 17.7 g/dL Final   08/20/2022 9.8 (L) 13.0 - 17.7 g/dL Final      HCT Hematocrit   Date Value Ref Range Status   08/21/2022 30.2 (L) 37.5 - 51.0 % Final   08/20/2022 28.8 (L) 37.5 - 51.0 % Final      Platlets No results found for: LABPLAT   MCV MCV   Date Value Ref Range Status   08/21/2022 87.3 79.0 - 97.0 fL Final   08/20/2022 86.2 79.0 - 97.0 fL Final          Sodium Sodium   Date Value Ref Range Status   08/22/2022 138 136 - 145 mmol/L Final   08/21/2022 137 136 - 145 mmol/L Final   08/20/2022 136 136 - 145 mmol/L Final      Potassium Potassium   Date Value Ref Range Status   08/22/2022 4.7 3.5 - 5.2 mmol/L Final   08/21/2022 4.3 3.5 - 5.2 mmol/L Final   08/20/2022 4.4 3.5 - 5.2 mmol/L Final      Chloride Chloride   Date Value Ref Range Status   08/22/2022 98 98 - 107 mmol/L Final   08/21/2022 96 (L) 98 - 107 mmol/L Final   08/20/2022 96 (L) 98 - 107 mmol/L Final      CO2 CO2   Date Value Ref Range Status   08/22/2022 32.0 (H) 22.0 - 29.0 mmol/L Final   08/21/2022 27.0 22.0 - 29.0 mmol/L Final   08/20/2022 30.0 (H) 22.0 - 29.0 mmol/L Final      BUN BUN   Date Value Ref Range Status   08/22/2022 73 (H) 8 - 23 mg/dL Final   08/21/2022 74 (H) 8 - 23 mg/dL Final   08/20/2022 86 (H) 8 - 23 mg/dL Final      Creatinine Creatinine   Date Value Ref Range Status   08/22/2022 2.64 (H) 0.76 - 1.27 mg/dL Final   08/21/2022 2.71 (H) 0.76 - 1.27 mg/dL Final   08/20/2022 2.97 (H) 0.76 - 1.27 mg/dL Final      Calcium Calcium    Date Value Ref Range Status   08/22/2022 8.4 (L) 8.6 - 10.5 mg/dL Final   08/21/2022 8.1 (L) 8.6 - 10.5 mg/dL Final   08/20/2022 8.2 (L) 8.6 - 10.5 mg/dL Final      PO4 No results found for: CAPO4   Albumin Albumin   Date Value Ref Range Status   08/22/2022 3.00 (L) 3.50 - 5.20 g/dL Final      Magnesium No results found for: MG   Uric Acid No results found for: URICACID        Results Review:     I reviewed the patient's new clinical results.    amLODIPine, 10 mg, Oral, Daily  amoxicillin-clavulanate, 1 tablet, Oral, Q12H  apixaban, 5 mg, Oral, Q12H  aspirin, 81 mg, Oral, Daily  bethanechol, 10 mg, Oral, TID  bumetanide, 2 mg, Intravenous, Daily  cetirizine, 5 mg, Oral, Daily  insulin detemir, 15 Units, Subcutaneous, Nightly  insulin lispro, 0-9 Units, Subcutaneous, TID AC  Insulin Lispro, 5 Units, Subcutaneous, TID With Meals  levothyroxine, 50 mcg, Oral, Q AM  metoprolol succinate XL, 50 mg, Oral, Q24H  polyethylene glycol, 17 g, Oral, Daily  senna-docusate sodium, 2 tablet, Oral, BID  sodium chloride, 10 mL, Intravenous, Q12H  sodium chloride, 10 mL, Intravenous, Q12H  tamsulosin, 0.4 mg, Oral, Daily  vitamin D3, 5,000 Units, Oral, Daily           Medication Review: Reviewed    Assessment & Plan       Acute renal failure (ARF) (HCC)    Type 2 diabetes mellitus (HCC)    Hypertension    Hyperlipidemia    Hypothyroidism (acquired)    S/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 Au    SSS (sick sinus syndrome) ([facility])    Dislocation of left shoulder joint, initial encounter    Cellulitis in diabetic foot ([facility])    Fall    Proximal phalanx fracture of the second digit extending into the second metatarsal joint    Acute systolic heart failure ([facility])    DVT, bilateral lower limbs ([facility])    Acute respiratory failure with hypoxia ([facility])        1.  Acute kidney injury: Baseline creatinine 1.02-1.05.  Patient was admitted and was started on antibiotic for osteomyelitis.  He received vancomycin which was started on 7/27/2022 until  8/4/2022 due to acute rise in creatinine to 2.07.  Renal artery duplex negative for renal artery stenosis.  Ultrasound kidney showed normal size kidney right 11.6 cm, left 11.1 cm.  Normal bladder.  CK1 71, C4 complement 34, C3 complement 137, TRISH negative, urine eos negative, urine protein 57, urine creatinine 150,Normal ultrasound kidney without  any obstruction.  No renal artery stenosis.  2.  Osteomyelitis left toe.  3.  Type 2 diabetes.  4.  History of hypertension  5.  History of hyperlipidemia.  6.  CAD s/p CABG 3/22/2019 by Dr. Au's.  7.  Sick sinus syndrome.  8.  Proteinuria: Protein creatinine ratio 0.38 g      Recommendations plan.  Etiology of [place] thought to be ATN ( vancomycin related). Recommend aoviding PICC line if possible. Continue bumex 2 mg IV for optimization of volume status. Can transition to oral Bumex 2 mg daily Daily close to discharge. Strict I/O. Discussed with daughter at bedside.    Blake Tilley MD  08/22/22  12:07 EDT

## 2022-08-22 NOTE — PLAN OF CARE
Goal Outcome Evaluation:  Plan of Care Reviewed With: patient           Outcome Evaluation: VSS. Alert, oriented x4. No complaints of pain. Max assist x2 to chair. Protective orthotic shoe in place L foot, NWB. Will continue to monitor.

## 2022-08-22 NOTE — PLAN OF CARE
Goal Outcome Evaluation:  Plan of Care Reviewed With: patient, daughter        Progress: improving  Outcome Evaluation: Pt demonstrated good participation with therapy and showed improvements in upright posture tolerance. Pt able to sit EOB with CGA/Min A x5 minutes. Pt performed STS with BUE support, offloading shoe donned,a and Max A x2. Pt demonstrated difficulty maintaining NWB status on LLE despite max education and manual assist. Activity limited by fatigue. Continue to recommend IRF at d/c for best functional outcome.

## 2022-08-22 NOTE — PROGRESS NOTES
Cardiothoracic Surgery Progress Note      POD  postop day 20 -left great toe transmetatarsal amputation primary closure     LOS: 26 days      Subjective: No complaints. Awake and alert.     Objective:  Vital Signs vital signs below noted T-max past 24 hours 97.8 °F  Temp:  [97.4 °F (36.3 °C)-98.2 °F (36.8 °C)] 97.6 °F (36.4 °C)  Heart Rate:  [61-72] 66  Resp:  [16-18] 16  BP: ()/(65-79) 102/66    Physical Exam:   General Appearance: Awake and alert.   Lungs:   Heart:   Skin: Ulceration middle phalangeal joint left second toe with exposed phalangeal bone/joint space   Incision: Amputation site dressing change done by Dr. Gomez.  Overall skin margins are viable with some ischemia in the most proximal portion.  Skin flaps are viable.     Results:  Results from last 7 days   Lab Units 08/21/22  0646   WBC 10*3/mm3 9.20   HEMOGLOBIN g/dL 10.1*   HEMATOCRIT % 30.2*   PLATELETS 10*3/mm3 163     Results from last 7 days   Lab Units 08/21/22  0646   SODIUM mmol/L 137   POTASSIUM mmol/L 4.3   CHLORIDE mmol/L 96*   CO2 mmol/L 27.0   BUN mg/dL 74*   CREATININE mg/dL 2.71*   GLUCOSE mg/dL 215*   CALCIUM mg/dL 8.1*         Assessment: #1.  Postop day 20 left great toe transmetatarsal amputation primary closure overall healing well with some ischemia at the proximal third  To expose middle phalangeal joint space with exposed bone secondary to hammertoe phenomena and chronic diabetic ulceration  3 status post remote coronary bypass grafting  4 status post remote pacemaker placement for sick sinus syndrome  5.  Recent fall with rib shoulder dislocation reduced in the emergency department on this admission  6.  Endovascular angioplasty stenting right lower extremity per Dr. Hernandez 2019        Plan: Continue daily dressing changes amputation site as well as over the ulceration the middle phalangeal joint space of the left second toe.  Antibiotics infectious disease.  Medical manage per hospitalist/cardiologist.  Renal medicine  following closely for increasing creatinine and BUN.       Chloe Man, DAVID - 08/22/22 - 07:29 EDT

## 2022-08-22 NOTE — CASE MANAGEMENT/SOCIAL WORK
Continued Stay Note  [facility]     Patient Name: Jermaine Singh  MRN: [id number]  Today's Date: 8/22/2022    Admit Date: 7/27/2022     Discharge Plan     Row Name 08/22/22 1632       Plan    Plan TBD    Plan Comments Case mgt f/u. chart reviewed. Original  d/c plan was to go to acute level rehab at [facility] when medically ready,now unsure if he can tolerate acute level of therapy, doubtful he will meet criteria for LTAC. Case mgt will f/u and talk with patient and wife re: future d/c plan               Discharge Codes    No documentation.               Expected Discharge Date and Time     Expected Discharge Date Expected Discharge Time    Aug 25, 2022             Sonja C Kellerman, RN

## 2022-08-22 NOTE — PROGRESS NOTES
[facility] Medicine Services  PROGRESS NOTE    Patient Name: Jermaine Singh  : 1945  MRN: [id number]    Date of Admission: 2022  Primary Care Physician: Farooq Painter MD    Subjective   Subjective     CC:  Follow-up renal failure    HPI:  Resting comfortably this afternoon, creatinine continues to improve.  Tolerating diuresis.  No acute complaints.    ROS:  Gen- No fevers, chills  CV- No chest pain, palpitations  Resp- No cough, dyspnea  GI- No N/V/D, abd pain     Objective   Objective     Vital Signs:   Temp:  [97.4 °F (36.3 °C)-98.2 °F (36.8 °C)] 97.6 °F (36.4 °C)  Heart Rate:  [61-67] 66  Resp:  [16-18] 16  BP: ()/(66-79) 102/66  Flow (L/min):  [4] 4     Physical Exam:  Constitutional: Initially sleeping in bedside chair, easily awoken, awake, alert, no acute distress  HENT: NCAT, mucous membranes moist  Respiratory: Clear to auscultation bilaterally, respiratory effort normal   Cardiovascular: RRR, no murmurs, rubs, or gallops, palpable radial pulses  Gastrointestinal: Positive bowel sounds, soft, nontender, nondistended  Musculoskeletal: LT foot with postoperative bandaging  Psychiatric: Appropriate affect, cooperative  Neurologic: Speech clear and fluent, moving extremities spontaneously    Results Reviewed:  LAB RESULTS:      Lab 22  0646 22  0639 22  0333   WBC 9.20 9.07 11.20*   HEMOGLOBIN 10.1* 9.8* 10.7*   HEMATOCRIT 30.2* 28.8* 31.9*   PLATELETS 163 157 160   MCV 87.3 86.2 87.2         Lab 22  0656 22  0646 22  0639 22  0333 22  0327   SODIUM 138 137 136 133* 135*   POTASSIUM 4.7 4.3 4.4 4.4 4.1   CHLORIDE 98 96* 96* 93* 96*   CO2 32.0* 27.0 30.0* 24.0 26.0   ANION GAP 8.0 14.0 10.0 16.0* 13.0   BUN 73* 74* 86* 85* 82*   CREATININE 2.64* 2.71* 2.97* 3.23* 3.17*   EGFR 24.2* 23.4* 21.0* 19.0* 19.4*   GLUCOSE 194* 215* 139* 225* 63*   CALCIUM 8.4* 8.1* 8.2* 8.1* 8.3*   PHOSPHORUS 4.5  --   --   --   --           Lab 08/22/22  0656   ALBUMIN 3.00*                     Brief Urine Lab Results  (Last result in the past 365 days)      Color   Clarity   Blood   Leuk Est   Nitrite   Protein   CREAT   Urine HCG        08/14/22 1818             149.9               Microbiology Results Abnormal     Procedure Component Value - Date/Time    Eosinophil Smear - Urine, Urine, Clean Catch [[id number]]  (Normal) Collected: 08/14/22 1818    Lab Status: Final result Specimen: Urine, Clean Catch Updated: 08/14/22 2101     Eosinophil Smear 0 % EOS/100 Cells     Narrative:      No eosinophil seen    Anaerobic Culture - Tissue, Toe, Left [[id number]] Collected: 08/02/22 1027    Lab Status: Final result Specimen: Tissue from Toe, Left Updated: 08/07/22 0858     Anaerobic Culture No anaerobes isolated at 5 days    Blood Culture - Blood, Hand, Left [[id number]]  (Normal) Collected: 07/27/22 2040    Lab Status: Final result Specimen: Blood from Hand, Left Updated: 08/01/22 2132     Blood Culture No growth at 5 days    Blood Culture - Blood, Hand, Right [[id number]]  (Normal) Collected: 07/27/22 2030    Lab Status: Final result Specimen: Blood from Hand, Right Updated: 08/01/22 2132     Blood Culture No growth at 5 days    COVID PRE-OP / PRE-PROCEDURE SCREENING ORDER (NO ISOLATION) - Swab, Nasopharynx [[id number]]  (Normal) Collected: 07/27/22 2246    Lab Status: Final result Specimen: Swab from Nasopharynx Updated: 07/27/22 2357    Narrative:      The following orders were created for panel order COVID PRE-OP / PRE-PROCEDURE SCREENING ORDER (NO ISOLATION) - Swab, Nasopharynx.  Procedure                               Abnormality         Status                     ---------                               -----------         ------                     COVID-19 and FLU A/B PCR...[[id number]]  Normal              Final result                 Please view results for these tests on the individual orders.    COVID-19 and FLU A/B PCR - Swab, Nasopharynx  [[id number]]  (Normal) Collected: 07/27/22 2246    Lab Status: Final result Specimen: Swab from Nasopharynx Updated: 07/27/22 9087     COVID19 Not Detected     Influenza A PCR Not Detected     Influenza B PCR Not Detected    Narrative:      Fact sheet for providers: https://www.fda.gov/media/917642/download    Fact sheet for patients: https://www.fda.gov/media/036303/download    Test performed by PCR.          No radiology results from the last 24 hrs    Results for orders placed during the hospital encounter of 07/27/22    Adult Transthoracic Echo Complete W/ Cont if Necessary Per Protocol    Interpretation Summary  · Left ventricular ejection fraction appears to be 46 - 50%. Left ventricular systolic function is low normal.  · Left ventricular septal hypokinesis  · No significant valvular heart disease      I have reviewed the medications:  Scheduled Meds:amLODIPine, 10 mg, Oral, Daily  amoxicillin-clavulanate, 1 tablet, Oral, Q12H  apixaban, 5 mg, Oral, Q12H  aspirin, 81 mg, Oral, Daily  bethanechol, 10 mg, Oral, TID  bumetanide, 2 mg, Intravenous, Daily  cetirizine, 5 mg, Oral, Daily  insulin detemir, 15 Units, Subcutaneous, Nightly  insulin lispro, 0-9 Units, Subcutaneous, TID AC  Insulin Lispro, 5 Units, Subcutaneous, TID With Meals  levothyroxine, 50 mcg, Oral, Q AM  metoprolol succinate XL, 50 mg, Oral, Q24H  polyethylene glycol, 17 g, Oral, Daily  senna-docusate sodium, 2 tablet, Oral, BID  sodium chloride, 10 mL, Intravenous, Q12H  sodium chloride, 10 mL, Intravenous, Q12H  tamsulosin, 0.4 mg, Oral, Daily  vitamin D3, 5,000 Units, Oral, Daily      Continuous Infusions:   PRN Meds:.•  acetaminophen **OR** acetaminophen **OR** acetaminophen  •  benzocaine-menthol  •  bisacodyl  •  calcium carbonate  •  dextrose  •  dextrose  •  glucagon (human recombinant)  •  HYDROcodone-acetaminophen  •  ipratropium-albuterol  •  nitroglycerin  •  ondansetron **OR** ondansetron  •  phenol  •  prochlorperazine  •   sennosides-docusate  •  simethicone  •  [COMPLETED] Insert peripheral IV **AND** sodium chloride  •  sodium chloride  •  sodium chloride    Assessment & Plan   Assessment & Plan     Active Hospital Problems    Diagnosis  POA   • **Acute renal failure (ARF) ([facility]) [N17.9]  Yes   • Acute systolic heart failure ([facility]) [I50.21]  No   • DVT, bilateral lower limbs ([facility]) [I82.403]  No   • Acute respiratory failure with hypoxia ([facility]) [J96.01]  No   • Proximal phalanx fracture of the second digit extending into the second metatarsal joint [S92.919A]  Yes   • Dislocation of left shoulder joint, initial encounter [S43.005A]  Yes   • Cellulitis in diabetic foot ([facility]) [E11.628, L03.119]  Yes   • Fall [W19.XXXA]  Yes   • SSS (sick sinus syndrome) ([facility]) [I49.5]  Yes   • Hyperlipidemia [E78.5]  Yes   • Hypertension [I10]  Yes   • S/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 Au [Z95.1]  Not Applicable   • Hypothyroidism (acquired) [E03.9]  Yes   • Type 2 diabetes mellitus ([facility]) [E11.9]  Yes      Resolved Hospital Problems    Diagnosis Date Resolved POA   • Sepsis ([facility]) [A41.9] 08/21/2022 Yes        Brief Hospital Course to date:  Jermaine Singh is a 77 y.o. male w/ DM2, CAD s/p prior CABG, PVD s/p RT LE angioplasty 7/2021 who initially presented to the ED 7/27/22 after sustaining a fall while walking his dog, identified to have LT shoulder dislocation reduced in the ED, at that time he had systemic markers for illness, admitted for concern of osteomyelitis and started on empiric IV antibiotics; he is now s/p LT first toe amputation 8/2 w/ Dr. Márquez; his hospitalization has been prolonged/complicated by acute pulmonary disease with marked hypoxia, and significant renal dysfunction; he has been followed by ID, nephrology, cardiology with slow but gradual recovery    Assessment/plan    Acute renal failure on likely CKD 2  -Base eGFR 60-70s;Cr 0.9-1.2  - Renal US unrevealing, renal arterial duplex w/o evidence for stenosis  -Creatinine peaked  3.23  -Nephrology follows, continue IV diuresis per their recommendations  - Creatinine continues to slowly improve daily    Acute hypoxic respiratory insufficiency, improved  Acute HFrEF  -Cardiology following, interpreted EF 40%  - VQ scan interpreted as low probability  - Oral Toprol-XL; other meds limited 2/2 renal disease; cards following  -Renal failure likely a contributing factor  -IV Bumex per nephrology  -SPO2 low 90s on room air today    LT first toe osteomyelitis/cellulitis, polymicrobial, POA  PAD s/p prior angioplasty to RT LE  -Follows w/ Dr. Joseph (podiatry) opt; on PO doxy PTA  -Arterial duplex strongly suggestive of PAD; no revasc per sgy/vasc team  - Tissue Cx w/ Enterococcus faecalis/casseliflavus  -S/p LT first toe amputation 8/2 w/ Dr. Márquez  -Lost PICC line 8/18; no replacement 2/2 renal disease  -S/p IV Unasyn, now on oral Augmentin due to loss of IV access, planned through 9/13/22 (x6-week postoperative)    Bilateral LE DVTs  -Continue Eliquis    Fall w/ anterior LT shoulder dislocation, reduced in ED  -Orthopedics (Dr. Gipson) has seen, recommend nonoperative management, sling for comfort  -Per Ortho: Shoulder ROM as tolerated, avoid abduction+ external rotation, continue PT/OT, follow-up outpatient  - Approaching timeline for outpatient follow-up, patient remains hospitalized, may ask Ortho for brief follow-up PTD for rehab recommendations    Acute urinary retention  -cont bethanechol, Flomax  - FC placed 8/18 per renal  - Voiding trial after aggressive IV diuresis     DM type 2, A1c 9.0%, with long-term use of insulin  -Basal bolus insulin, Accu-Cheks w/ SSI    Hypertension  CAD s/p prior CABG  SSS s/p PPM  - Next device check 12/28/22  -cont Toprol-XL, amlodipine, aspirin  - ACEi on hold 2/2 renal disease    Hypothyroidism  -Levothyroxine    Constipation w/ N/V  -Improved?, Continue bowel regimen    Expected Discharge Location and Transportation: [facility] rehab  Expected Discharge  Date: 8/25    DVT prophylaxis:  Medical and mechanical DVT prophylaxis orders are present.     AM-PAC 6 Clicks Score (PT): 9 (08/21/22 0800)    CODE STATUS:   Code Status and Medical Interventions:   Ordered at: 07/28/22 0000     Code Status (Patient has no pulse and is not breathing):    CPR (Attempt to Resuscitate)     Medical Interventions (Patient has pulse or is breathing):    Full Support       Navneet Venegas, DO  08/22/22

## 2022-08-23 LAB
ALBUMIN SERPL-MCNC: 3.1 G/DL (ref 3.5–5.2)
ANION GAP SERPL CALCULATED.3IONS-SCNC: 10 MMOL/L (ref 5–15)
BACTERIA UR QL AUTO: ABNORMAL /HPF
BILIRUB UR QL STRIP: NEGATIVE
BUN SERPL-MCNC: 51 MG/DL (ref 8–23)
BUN/CREAT SERPL: 27.7 (ref 7–25)
CALCIUM SPEC-SCNC: 8.4 MG/DL (ref 8.6–10.5)
CHLORIDE SERPL-SCNC: 100 MMOL/L (ref 98–107)
CLARITY UR: ABNORMAL
CO2 SERPL-SCNC: 30 MMOL/L (ref 22–29)
COLOR UR: ABNORMAL
CREAT SERPL-MCNC: 1.84 MG/DL (ref 0.76–1.27)
CREAT UR-MCNC: 59.2 MG/DL
EGFRCR SERPLBLD CKD-EPI 2021: 37.3 ML/MIN/1.73
GLUCOSE BLDC GLUCOMTR-MCNC: 112 MG/DL (ref 70–130)
GLUCOSE BLDC GLUCOMTR-MCNC: 166 MG/DL (ref 70–130)
GLUCOSE BLDC GLUCOMTR-MCNC: 86 MG/DL (ref 70–130)
GLUCOSE SERPL-MCNC: 190 MG/DL (ref 65–99)
GLUCOSE UR STRIP-MCNC: NEGATIVE MG/DL
HGB UR QL STRIP.AUTO: ABNORMAL
HYALINE CASTS UR QL AUTO: ABNORMAL /LPF
KETONES UR QL STRIP: NEGATIVE
LEUKOCYTE ESTERASE UR QL STRIP.AUTO: ABNORMAL
NITRITE UR QL STRIP: NEGATIVE
PH UR STRIP.AUTO: 5.5 [PH] (ref 5–8)
PHOSPHATE SERPL-MCNC: 3.7 MG/DL (ref 2.5–4.5)
POTASSIUM SERPL-SCNC: 5 MMOL/L (ref 3.5–5.2)
PROT UR QL STRIP: ABNORMAL
RBC # UR STRIP: ABNORMAL /HPF
REF LAB TEST METHOD: ABNORMAL
SODIUM SERPL-SCNC: 140 MMOL/L (ref 136–145)
SODIUM UR-SCNC: 45 MMOL/L
SP GR UR STRIP: 1.01 (ref 1–1.03)
SQUAMOUS #/AREA URNS HPF: ABNORMAL /HPF
UROBILINOGEN UR QL STRIP: ABNORMAL
UUN 24H UR-MCNC: 602 MG/DL
WBC # UR STRIP: ABNORMAL /HPF

## 2022-08-23 PROCEDURE — 99024 POSTOP FOLLOW-UP VISIT: CPT | Performed by: PHYSICIAN ASSISTANT

## 2022-08-23 PROCEDURE — 82570 ASSAY OF URINE CREATININE: CPT | Performed by: INTERNAL MEDICINE

## 2022-08-23 PROCEDURE — 63710000001 INSULIN DETEMIR PER 5 UNITS: Performed by: FAMILY MEDICINE

## 2022-08-23 PROCEDURE — 97110 THERAPEUTIC EXERCISES: CPT

## 2022-08-23 PROCEDURE — 63710000001 INSULIN LISPRO (HUMAN) PER 5 UNITS: Performed by: FAMILY MEDICINE

## 2022-08-23 PROCEDURE — 99232 SBSQ HOSP IP/OBS MODERATE 35: CPT | Performed by: INTERNAL MEDICINE

## 2022-08-23 PROCEDURE — 84300 ASSAY OF URINE SODIUM: CPT | Performed by: INTERNAL MEDICINE

## 2022-08-23 PROCEDURE — 80069 RENAL FUNCTION PANEL: CPT | Performed by: INTERNAL MEDICINE

## 2022-08-23 PROCEDURE — 84540 ASSAY OF URINE/UREA-N: CPT | Performed by: INTERNAL MEDICINE

## 2022-08-23 PROCEDURE — 82962 GLUCOSE BLOOD TEST: CPT

## 2022-08-23 PROCEDURE — 97530 THERAPEUTIC ACTIVITIES: CPT

## 2022-08-23 PROCEDURE — 81001 URINALYSIS AUTO W/SCOPE: CPT | Performed by: INTERNAL MEDICINE

## 2022-08-23 RX ADMIN — Medication 5000 UNITS: at 08:48

## 2022-08-23 RX ADMIN — BETHANECHOL CHLORIDE 10 MG: 10 TABLET ORAL at 20:44

## 2022-08-23 RX ADMIN — TAMSULOSIN HYDROCHLORIDE 0.4 MG: 0.4 CAPSULE ORAL at 08:46

## 2022-08-23 RX ADMIN — LEVOTHYROXINE SODIUM 50 MCG: 50 TABLET ORAL at 06:30

## 2022-08-23 RX ADMIN — Medication 10 ML: at 08:44

## 2022-08-23 RX ADMIN — INSULIN LISPRO 5 UNITS: 100 INJECTION, SOLUTION INTRAVENOUS; SUBCUTANEOUS at 08:45

## 2022-08-23 RX ADMIN — CETIRIZINE HYDROCHLORIDE 5 MG: 10 TABLET, FILM COATED ORAL at 08:45

## 2022-08-23 RX ADMIN — AMOXICILLIN AND CLAVULANATE POTASSIUM 500 MG: 500; 125 TABLET, FILM COATED ORAL at 08:44

## 2022-08-23 RX ADMIN — BUMETANIDE 2 MG: 0.25 INJECTION INTRAMUSCULAR; INTRAVENOUS at 08:44

## 2022-08-23 RX ADMIN — METOPROLOL SUCCINATE 50 MG: 50 TABLET, EXTENDED RELEASE ORAL at 08:45

## 2022-08-23 RX ADMIN — APIXABAN 5 MG: 5 TABLET, FILM COATED ORAL at 08:45

## 2022-08-23 RX ADMIN — ASPIRIN 81 MG CHEWABLE TABLET 81 MG: 81 TABLET CHEWABLE at 08:45

## 2022-08-23 RX ADMIN — SENNOSIDES AND DOCUSATE SODIUM 2 TABLET: 50; 8.6 TABLET ORAL at 08:45

## 2022-08-23 RX ADMIN — APIXABAN 5 MG: 5 TABLET, FILM COATED ORAL at 20:44

## 2022-08-23 RX ADMIN — Medication 10 ML: at 20:45

## 2022-08-23 RX ADMIN — AMLODIPINE BESYLATE 10 MG: 10 TABLET ORAL at 08:45

## 2022-08-23 RX ADMIN — AMOXICILLIN AND CLAVULANATE POTASSIUM 500 MG: 500; 125 TABLET, FILM COATED ORAL at 20:44

## 2022-08-23 RX ADMIN — INSULIN DETEMIR 15 UNITS: 100 INJECTION, SOLUTION SUBCUTANEOUS at 20:43

## 2022-08-23 RX ADMIN — ACETAMINOPHEN 650 MG: 325 TABLET, FILM COATED ORAL at 20:44

## 2022-08-23 RX ADMIN — BETHANECHOL CHLORIDE 10 MG: 10 TABLET ORAL at 15:59

## 2022-08-23 RX ADMIN — BETHANECHOL CHLORIDE 10 MG: 10 TABLET ORAL at 08:44

## 2022-08-23 NOTE — PROGRESS NOTES
Jermaine JIMENES Francisco  1945  [id number]    Date of Consult: 7/28/22    Admission Date: 7/27/2022      Requesting Provider: No ref. provider found  Evaluating Physician: Sage Braga MD    Reason for Consultation: Evaluation of toe wound    History of present illness:    Patient is a 77 y.o. male with coronary disease history of CABG diabetes mellitus type 2 hypothyroidism hypertension hyperlipidemia prevascular disease presents the emergency room after having a fall and injuring left shoulder patient tripped while walking his dog.  Coincidentally patient wears an orthotic shoe as recommended by a podiatrist has been on oral doxycycline for a left toe wound we are asked to evaluate this patient to start on broad-spectrum antibiotics occluding vancomycin and Zosyn.  X-ray of left foot showed soft tissue swelling in the forefoot without obvious fracture or osteomyelitis.    Has history of pacemaker    History of vascular stent placed in right leg.    7/29/2022; is having bone scan today denies fevers rash sore throat or diarrhea    7/30/22:  Bone scan compatible with osteomyelitis distal phalanx of first digit, acute fracture 2nd digit.  Afebrile. Blood cultures no growth to date.     7/31/22:  Afebrile.   Dr. Márquez consulting Dr. Apodaca for vascular studies left lower extremity.   No n/v/d.  Complains of shoulder pain.     8/2/22; doing well; had surgery today; toe amputation, no fever, rash, sore throat    8/3/22; no events overnight; resting quietly no events overnight    8/4/22; doing well; no events overnight; no fever, rash, sore throat;   8/5/22; no events overnight; no fever, rash, sore throat, no diarrhea    8/8/22; no events overnight; no fever, rash, sore throat    8/9/22; no events overnight; no fever, rash, sore throat no diarrhea  8/11/22; patient on hfnc, followed by cardiology being worked up for elevated troponin, pobnp.  Resting quietly    8/12/22; on hfnc, getting diuresed, no fever, rahs, sore  throat has nausea, reports chest pain    8/14/22: on 4L O2.  Denies f/c, sore throat, n/v/d, rashes.   8/15/22: on 4.5 L O2.  Still with right upper chest wall pain.  Denies f/c, sob, n/v/d, rashes. Just back from [facility].      8/17/2022 patient's is on 2.5 L of oxygen has no complaints denies fevers rash sore throat family is by bedside    8/19/22 on oxygen by nasal cannula resting comfortably in bed no complaints denies fevers or sore throat    8/22/22; doing well; no events overnight; no fever, rash, sore throat off o2 by nasal cannula    8/23/22 no events overnight; no fever, rash, sore throat; of o2; feels well; no complaints  Past Medical History:   Diagnosis Date   • Asthma    • Coronary artery disease    • Diabetes mellitus (HCC) 2000    started on inuslin 12/2018; started on po meds in 2000; checking blood sugars daily    • Disease of thyroid gland     po meds daily for hypothyroidism    • History of fracture as a child     rt leg- severe    • Hyperlipidemia    • Hypertension    • Hypothyroidism    • Peripheral neuropathy    • Peripheral vascular disease (HCC)     s/p angiogram 2/19-needs stent in left leg    • RBBB    • Right knee pain    • Vitamin D deficiency        Past Surgical History:   Procedure Laterality Date   • APPENDECTOMY     • CARDIAC CATHETERIZATION N/A 2/14/2019    Procedure: Left Heart Cath;  Surgeon: Cooper Apodaca MD;  Location:  [facility] CATH INVASIVE LOCATION;  Service: Cardiology   • CARDIAC ELECTROPHYSIOLOGY PROCEDURE N/A 5/18/2022    Procedure: DEVICE IMPLANT;  Surgeon: Cooper Apodaca MD;  Location:  [facility] CATH INVASIVE LOCATION;  Service: Cardiology;  Laterality: N/A;   • COLONOSCOPY     • CORONARY ARTERY BYPASS GRAFT N/A 3/22/2019    Procedure: MEDIAN STERNOTOMY, CORONARY ARTERY BYPASS GRAFT X3, UTILIZING THE LEFT INTERNAL MAMMARY ARTERY, EVH AND OPEN HARVEST OF THE RIGHT GREATER SAPHENOUS VEIN, EXPLORATION OF THE LEFT LEG;  Surgeon: Ap Au MD;   Location:  [facility];  Service: Cardiothoracic   • EYE SURGERY Bilateral     cataracts    • INTERVENTIONAL RADIOLOGY PROCEDURE N/A 7/29/2021    Procedure: Abdominal Aortagram with Runoff;  Surgeon: Jermaine Hernandez MD;  Location:  [facility] CATH INVASIVE LOCATION;  Service: Cardiovascular;  Laterality: N/A;   • KNEE ARTHROSCOPY      right x 2, left x 1   • LACERATION REPAIR      right leg   • LEG SURGERY      2 for fracture of rt leg    • TONSILLECTOMY      Adnoidectomy   • TRANS METATARSAL AMPUTATION Left 8/2/2022    Procedure: GREAT TOE AMPUTATION LEFT;  Surgeon: Gopal Márquez MD;  Location:  [facility];  Service: Vascular;  Laterality: Left;       Family History   Problem Relation Age of Onset   • Coronary artery disease Mother    • Diabetes Mother    • Cancer Father        Social History     Socioeconomic History   • Marital status:    • Number of children: 2   Tobacco Use   • Smoking status: Never Smoker   • Smokeless tobacco: Never Used   Substance and Sexual Activity   • Alcohol use: No   • Drug use: No   • Sexual activity: Defer       No Known Allergies      Medication:    Current Facility-Administered Medications:   •  acetaminophen (TYLENOL) tablet 650 mg, 650 mg, Oral, Q4H PRN, 650 mg at 08/21/22 2043 **OR** acetaminophen (TYLENOL) 160 MG/5ML solution 650 mg, 650 mg, Oral, Q4H PRN, 650 mg at 08/20/22 2155 **OR** acetaminophen (TYLENOL) suppository 650 mg, 650 mg, Rectal, Q4H PRN, Nishant Allan PA  •  amLODIPine (NORVASC) tablet 10 mg, 10 mg, Oral, Daily, Nishant Allan PA, 10 mg at 08/23/22 0845  •  amoxicillin-clavulanate (AUGMENTIN) 500-125 MG per tablet 500 mg, 1 tablet, Oral, Q12H, Clifford Tavares, [facility], 500 mg at 08/23/22 0844  •  [COMPLETED] apixaban (ELIQUIS) tablet 10 mg, 10 mg, Oral, Q12H, 10 mg at 08/20/22 2156 **FOLLOWED BY** apixaban (ELIQUIS) tablet 5 mg, 5 mg, Oral, Q12H, Reilly Thomas MD, 5 mg at 08/23/22 0845  •  aspirin chewable tablet 81 mg, 81 mg, Oral, Daily, Nishant Allan,  PA, 81 mg at 08/23/22 0845  •  benzocaine-menthol (CEPACOL) lozenge 1 lozenge, 1 lozenge, Mouth/Throat, Q2H PRN, Luz Elena Batista DO, 1 lozenge at 08/18/22 1811  •  bethanechol (URECHOLINE) tablet 10 mg, 10 mg, Oral, TID, Luz Elena Batista DO, 10 mg at 08/23/22 0844  •  bisacodyl (DULCOLAX) suppository 10 mg, 10 mg, Rectal, Daily PRN, Nishant Allan PA, 10 mg at 08/12/22 2041  •  bumetanide (BUMEX) injection 2 mg, 2 mg, Intravenous, Daily, Blake Tilley MD, 2 mg at 08/23/22 0844  •  calcium carbonate (TUMS) chewable tablet 500 mg (200 mg elemental), 2 tablet, Oral, TID PRN, Hayder Vogel MD, 2 tablet at 08/17/22 1642  •  cetirizine (zyrTEC) tablet 5 mg, 5 mg, Oral, Daily, Nishant Allan PA, 5 mg at 08/23/22 0845  •  dextrose (D50W) (25 g/50 mL) IV injection 25 g, 25 g, Intravenous, Q15 Min PRN, Nishant Allan PA  •  dextrose (GLUTOSE) oral gel 15 g, 15 g, Oral, Q15 Min PRN, Nishant Allan PA, 15 g at 08/18/22 0526  •  glucagon (human recombinant) (GLUCAGEN DIAGNOSTIC) injection 1 mg, 1 mg, Intramuscular, Q15 Min PRN, Nishant Allan PA  •  HYDROcodone-acetaminophen (NORCO) 5-325 MG per tablet 1 tablet, 1 tablet, Oral, Q4H PRN, Peter Braga DO, 1 tablet at 08/15/22 0733  •  insulin detemir (LEVEMIR) injection 15 Units, 15 Units, Subcutaneous, Nightly, Luz Elena Batista DO, 15 Units at 08/22/22 2031  •  Insulin Lispro (humaLOG) injection 0-9 Units, 0-9 Units, Subcutaneous, TID AC, Hayder Vogel MD, 2 Units at 08/22/22 1705  •  Insulin Lispro (humaLOG) injection 5 Units, 5 Units, Subcutaneous, TID With Meals, Luz Elena Batista DO, 5 Units at 08/23/22 0845  •  ipratropium-albuterol (DUO-NEB) nebulizer solution 3 mL, 3 mL, Nebulization, Q4H PRN, Eliana Hightower, APRN, 3 mL at 08/22/22 0113  •  levothyroxine (SYNTHROID, LEVOTHROID) tablet 50 mcg, 50 mcg, Oral, Q AM, Nishant Allan PA, 50 mcg at 08/23/22 0630  •  metoprolol succinate XL (TOPROL-XL) 24 hr tablet 50 mg, 50 mg, Oral, Q24H,  Nishant Allan PA, 50 mg at 08/23/22 0845  •  nitroglycerin (NITROSTAT) SL tablet 0.4 mg, 0.4 mg, Sublingual, Q5 Min PRN, Reilly Thomas MD, 0.4 mg at 08/13/22 2206  •  ondansetron (ZOFRAN) tablet 4 mg, 4 mg, Oral, Q6H PRN **OR** ondansetron (ZOFRAN) injection 4 mg, 4 mg, Intravenous, Q6H PRN, Nishant Allan PA, 4 mg at 08/10/22 1607  •  phenol (CHLORASEPTIC) 1.4 % liquid 1 spray, 1 spray, Mouth/Throat, Q2H PRN, Megan Shukla PA, 1 spray at 08/04/22 1610  •  polyethylene glycol (MIRALAX) packet 17 g, 17 g, Oral, Daily, Rajani Farr DO, 17 g at 08/22/22 0838  •  prochlorperazine (COMPAZINE) injection 5 mg, 5 mg, Intravenous, Q6H PRN, Rajani Farr DO, 5 mg at 08/13/22 1955  •  sennosides-docusate (PERICOLACE) 8.6-50 MG per tablet 2 tablet, 2 tablet, Oral, BID PRN, Nishant Allan PA, 2 tablet at 08/09/22 1402  •  sennosides-docusate (PERICOLACE) 8.6-50 MG per tablet 2 tablet, 2 tablet, Oral, BID, Reilly Thomas MD, 2 tablet at 08/23/22 0845  •  simethicone (MYLICON) chewable tablet 80 mg, 80 mg, Oral, 4x Daily PRN, Daphne Lal MD  •  sodium chloride 0.9 % flush 10 mL, 10 mL, Intravenous, Q12H, Rajani Farr DO, 10 mL at 08/23/22 0844  •  sodium chloride 0.9 % flush 10 mL, 10 mL, Intravenous, PRN, Rajani Farr DO, 10 mL at 08/16/22 1121  •  tamsulosin (FLOMAX) 24 hr capsule 0.4 mg, 0.4 mg, Oral, Daily, Luz Elena Batista DO, 0.4 mg at 08/23/22 0846  •  vitamin D3 capsule 5,000 Units, 5,000 Units, Oral, Daily, Nishant Allan PA, 5,000 Units at 08/23/22 0848    Facility-Administered Medications Ordered in Other Encounters:   •  Chlorhexidine Gluconate Cloth 2 % pads 1 application, 1 application, Topical, Q12H PRN, Mohan Arauz PA    Antibiotics:  Anti-Infectives (From admission, onward)    Ordered     Dose/Rate Route Frequency Start Stop    08/19/22 1219  amoxicillin-clavulanate (AUGMENTIN) 500-125 MG per tablet 500 mg        Ordering Provider: Clifford Tavares RPH    1  tablet Oral Every 12 Hours Scheduled 22 1600 22 2359    22 1550  cefepime (MAXIPIME) 2 g/100 mL 0.9% NS (mbp)        Ordering Provider: Sage Braga MD    2 g  200 mL/hr over 30 Minutes Intravenous Once 22 1645 22 1640    22 1111  vancomycin 1500 mg/500 mL 0.9% NS IVPB ([facility])        Ordering Provider: Nishant Allan PA    15 mg/kg × 98 kg Intravenous Once 22 2200 22 2130    22 1209  vancomycin in dextrose 5% 150 mL (VANCOCIN) IVPB 750 mg        Ordering Provider: Miguel Bray RPH    750 mg Intravenous Every 12 Hours 22 2200 22 0934    22 1209  vancomycin in dextrose 5% 150 mL (VANCOCIN) IVPB 750 mg        Ordering Provider: Miguel Bray RPH    750 mg  over 60 Minutes Intravenous Every 12 Hours 22 1300 22 1422    22 0014  piperacillin-tazobactam (ZOSYN) 3.375 g in iso-osmotic dextrose 50 ml (premix)        Ordering Provider: Eliana Hightower APRN    3.375 g  over 4 Hours Intravenous Every 8 Hours 22 0800 22 0303    22 2208  piperacillin-tazobactam (ZOSYN) 3.375 g in iso-osmotic dextrose 50 ml (premix)        Ordering Provider: Donny Hightower APRN    3.375 g  over 30 Minutes Intravenous Once 22 2210 22 0320    22 2208  vancomycin 2000 mg/500 mL 0.9% NS IVPB ([facility])        Ordering Provider: Donny Hightower APRN    20 mg/kg × 98 kg Intravenous Once 22 2210 22 0040            Review of Systems:  See HPI      Physical Exam:   Vital Signs  Temp (24hrs), Av.6 °F (36.4 °C), Min:97.1 °F (36.2 °C), Max:98 °F (36.7 °C)    Temp  Min: 97.1 °F (36.2 °C)  Max: 98 °F (36.7 °C)  BP  Min: 97/64  Max: 126/79  Pulse  Min: 60  Max: 69  Resp  Min: 17  Max: 18  SpO2  Min: 92 %  Max: 94 %    GENERAL: awake, alert, no distress  HEENT: Normocephalic, atraumatic.  PERRL. EOMI. No conjunctival injection. No icterus.   HEART: RRR; No murmur,  LUNGS: Clear to auscultation bilaterally    ABDOMEN: Soft, nontender,   :  Without Fonseca catheter.  MSK: Left foot Inspected the amputation site is clean dry and intact surrounding air  SKIN: Warm and dry without cutaneous eruptions on Inspection/palpation.        Laboratory Data    Results from last 7 days   Lab Units 08/21/22  0646 08/20/22  0639 08/19/22  0333   WBC 10*3/mm3 9.20 9.07 11.20*   HEMOGLOBIN g/dL 10.1* 9.8* 10.7*   HEMATOCRIT % 30.2* 28.8* 31.9*   PLATELETS 10*3/mm3 163 157 160     Results from last 7 days   Lab Units 08/22/22  0656   SODIUM mmol/L 138   POTASSIUM mmol/L 4.7   CHLORIDE mmol/L 98   CO2 mmol/L 32.0*   BUN mg/dL 73*   CREATININE mg/dL 2.64*   GLUCOSE mg/dL 194*   CALCIUM mg/dL 8.4*                             Estimated Creatinine Clearance: 31.4 mL/min (A) (by C-G formula based on SCr of 2.64 mg/dL (H)).      Microbiology:  Microbiology Results (last 10 days)     Procedure Component Value - Date/Time    Eosinophil Smear - Urine, Urine, Clean Catch [[id number]]  (Normal) Collected: 08/14/22 1818    Lab Status: Final result Specimen: Urine, Clean Catch Updated: 08/14/22 2101     Eosinophil Smear 0 % EOS/100 Cells     Narrative:      No eosinophil seen                Radiology:  Imaging Results (Last 72 Hours)     ** No results found for the last 72 hours. **        Estimated Creatinine Clearance: 31.4 mL/min (A) (by C-G formula based on SCr of 2.64 mg/dL (H)).      Impression:   Leukocytosis with neutrophilia, improved.   Lactic acidosis  Left great toe wound- osteomyelitis by bone scan- amputation recommended by Dr. Márquez   Status post fall  Diabetes mellitus type 2  Left shoulder dislocation  Probable peripheral vascular disease  Acute renal failure, ongoing.   Elevated proBNP/difficulty diuresis given renal failure.  Right upper chest wall pain, ongoing.     PLAN/RECOMMENDATIONS:     cont unasyn okay to reduce dose to every 12 hours given elevation of creatinine and     Plan was to continue IV antibiotic for 6 weeks from  8/2/22  however PICC line was removed by patient accidentally; given  patient's worsening kidney function and renal failure I would try to avoid a repeat picc line.    D/w family    Given that toe was amputated and I suspect good surgical margins were obtained I am willing to treat with oral Augmentin to complete a 6 week course from 8/2/22 (9/13/22)        Complicated case  Sage Braga MD  8/23/2022  12:53 EDT

## 2022-08-23 NOTE — PLAN OF CARE
Goal Outcome Evaluation:  Plan of Care Reviewed With: patient        Progress: improving  Outcome Evaluation: continue with diueresis per nephrology, cardiology has signed off, plan for cardinal [facility] 8/25 if medically ready

## 2022-08-23 NOTE — PROGRESS NOTES
"   LOS: 27 days    Patient Care Team:  Farooq Painter MD as PCP - General (Family Medicine)  Jermaine Hernandez MD as Consulting Physician (Cardiology)      Subjective     Interval History:     No acute events overnight. No new complaints       Review of Systems:   No CP or SOA , fever, chills, rigors, rash, N/V, Constipation.       Objective     Vital Sign Min/Max for last 24 hours  Temp  Min: 97.1 °F (36.2 °C)  Max: 97.9 °F (36.6 °C)   BP  Min: 97/64  Max: 126/79   Pulse  Min: 66  Max: 69   Resp  Min: 17  Max: 18   SpO2  Min: 92 %  Max: 94 %   Flow (L/min)  Min: 1  Max: 2   No data recorded     Flowsheet Rows    Flowsheet Row First Filed Value   Admission Height 190.5 cm (75\") Documented at 07/27/2022 1848   Admission Weight 98 kg (216 lb) Documented at 07/27/2022 1848          I/O this shift:  In: -   Out: 250 [Urine:250]  I/O last 3 completed shifts:  In: 1260 [P.O.:1260]  Out: 2050 [Urine:2050]    Physical Exam:    Gen: Alert, NAD   HENT: NC, AT, EOMI   NECK: Supple, no JVD, Trachea midline   LUNGS: CTA bilaterally, non labored respirtation   CVS: S1/S2 audible, RRR, no murmur   Abd: Soft, NT, ND, BS+   Ext: ++ pedal edema, no cyanosis   CNS: Alert, No focal deficit noted grossly  Psy: Cooperative  Skin: Warm, dry and intact      WBC WBC   Date Value Ref Range Status   08/21/2022 9.20 3.40 - 10.80 10*3/mm3 Final      HGB Hemoglobin   Date Value Ref Range Status   08/21/2022 10.1 (L) 13.0 - 17.7 g/dL Final      HCT Hematocrit   Date Value Ref Range Status   08/21/2022 30.2 (L) 37.5 - 51.0 % Final      Platlets No results found for: LABPLAT   MCV MCV   Date Value Ref Range Status   08/21/2022 87.3 79.0 - 97.0 fL Final          Sodium Sodium   Date Value Ref Range Status   08/22/2022 138 136 - 145 mmol/L Final   08/21/2022 137 136 - 145 mmol/L Final      Potassium Potassium   Date Value Ref Range Status   08/22/2022 4.7 3.5 - 5.2 mmol/L Final   08/21/2022 4.3 3.5 - 5.2 mmol/L Final      Chloride Chloride "   Date Value Ref Range Status   08/22/2022 98 98 - 107 mmol/L Final   08/21/2022 96 (L) 98 - 107 mmol/L Final      CO2 CO2   Date Value Ref Range Status   08/22/2022 32.0 (H) 22.0 - 29.0 mmol/L Final   08/21/2022 27.0 22.0 - 29.0 mmol/L Final      BUN BUN   Date Value Ref Range Status   08/22/2022 73 (H) 8 - 23 mg/dL Final   08/21/2022 74 (H) 8 - 23 mg/dL Final      Creatinine Creatinine   Date Value Ref Range Status   08/22/2022 2.64 (H) 0.76 - 1.27 mg/dL Final   08/21/2022 2.71 (H) 0.76 - 1.27 mg/dL Final      Calcium Calcium   Date Value Ref Range Status   08/22/2022 8.4 (L) 8.6 - 10.5 mg/dL Final   08/21/2022 8.1 (L) 8.6 - 10.5 mg/dL Final      PO4 No results found for: CAPO4   Albumin Albumin   Date Value Ref Range Status   08/22/2022 3.00 (L) 3.50 - 5.20 g/dL Final      Magnesium No results found for: MG   Uric Acid No results found for: URICACID        Results Review:     I reviewed the patient's new clinical results.    amLODIPine, 10 mg, Oral, Daily  amoxicillin-clavulanate, 1 tablet, Oral, Q12H  apixaban, 5 mg, Oral, Q12H  aspirin, 81 mg, Oral, Daily  bethanechol, 10 mg, Oral, TID  bumetanide, 2 mg, Intravenous, Daily  cetirizine, 5 mg, Oral, Daily  insulin detemir, 15 Units, Subcutaneous, Nightly  insulin lispro, 0-9 Units, Subcutaneous, TID AC  Insulin Lispro, 5 Units, Subcutaneous, TID With Meals  levothyroxine, 50 mcg, Oral, Q AM  metoprolol succinate XL, 50 mg, Oral, Q24H  polyethylene glycol, 17 g, Oral, Daily  senna-docusate sodium, 2 tablet, Oral, BID  sodium chloride, 10 mL, Intravenous, Q12H  tamsulosin, 0.4 mg, Oral, Daily  vitamin D3, 5,000 Units, Oral, Daily           Medication Review:     Assessment & Plan       Acute renal failure (ARF) (HCC)    Type 2 diabetes mellitus (HCC)    Hypertension    Hyperlipidemia    Hypothyroidism (acquired)    S/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 Au    SSS (sick sinus syndrome) ([facility])    Dislocation of left shoulder joint, initial encounter    Cellulitis in  diabetic foot (HCC)    Fall    Proximal phalanx fracture of the second digit extending into the second metatarsal joint    Acute systolic heart failure (HCC)    DVT, bilateral lower limbs (HCC)    Acute respiratory failure with hypoxia (HCC)    1- [place] - non oliguric -  Pre- renal azotemia/ATN (Hypotension vs vancomycin related) vs AIN - Renal duplex - no WILMA.   2- HTN   3- Anemia   4- Osteomyelitis - left toe   5- DM   6- CAD s/p CABG 3/22/19 - EF 45- 50%  7- Sick sinus syndrome   8- Proteinuria - UPCR 0.38   9- Pulmonary edema      Plan:  - Continue with current.   - Avoid nephrotoxic agents.   - Monitor I/O   - Check UA and BMP  - Renal diet.   - Adjust meds per renal function   - No emergent need of RRT.     Semaj Ly MD  08/23/22  09:08 EDT

## 2022-08-23 NOTE — PLAN OF CARE
Problem: Adult Inpatient Plan of Care  Goal: Plan of Care Review  Recent Flowsheet Documentation  Taken 8/23/2022 1452 by Rukhsana Renee, PT  Progress: improving  Plan of Care Reviewed With: patient  Outcome Evaluation: Patient stood from EOB x2 with mod assist x2. Patient still having difficulty maintaining NWB on L LE during t/f but today with repeated cues and education, was able to maintain NWB on L LE once static standing at EOB. Improved static sitting balance at EOB. Will continue to progress as able. Continue to recommend IRF at d/c.   Goal Outcome Evaluation:  Plan of Care Reviewed With: patient        Progress: improving  Outcome Evaluation: Patient stood from EOB x2 with mod assist x2. Patient still having difficulty maintaining NWB on L LE during t/f but today with repeated cues and education, was able to maintain NWB on L LE once static standing at EOB. Improved static sitting balance at EOB. Will continue to progress as able. Continue to recommend IRF at d/c.

## 2022-08-23 NOTE — PROGRESS NOTES
"  [facility]  PROGRESS NOTE    Date of Admission: 7/27/2022  Date of Service: 08/23/22    Primary Care Physician: Farooq Painter MD    Chief Complaint: Following for HTN, Acute CHF    Subjective    Sitting up in bed, wife at bedside.  Continues to feel better and spirits are better as well.  Awaiting word on rehab    Objective   Vitals: /79 (BP Location: Left arm, Patient Position: Lying)   Pulse 66   Temp 97.5 °F (36.4 °C) (Oral)   Resp 18   Ht 190.5 cm (75\")   Wt 110 kg (242 lb)   SpO2 94%   BMI 30.25 kg/m²       Physical Exam:  GENERAL: Alert, cooperative, in no acute distress.   HEENT: Normocephalic, no jugular venous distention  HEART: Regular rhythm, normal rate, and no murmurs, gallops, or rubs.   LUNGS: CTA all lung fields with coarse lung sounds, On 2L NC. Improved.  ABDOMEN: Soft, bowel sounds present, nontender   NEUROLOGIC: No focal abnormalities involving strength or sensation are noted.   EXTREMITIES: No clubbing, cyanosis.  Trace edema noted.     Results:  Results from last 7 days   Lab Units 08/21/22  0646 08/20/22  0639 08/19/22  0333   WBC 10*3/mm3 9.20 9.07 11.20*   HEMOGLOBIN g/dL 10.1* 9.8* 10.7*   HEMATOCRIT % 30.2* 28.8* 31.9*   PLATELETS 10*3/mm3 163 157 160     Results from last 7 days   Lab Units 08/22/22  0656 08/21/22  0646 08/20/22  0639   SODIUM mmol/L 138 137 136   POTASSIUM mmol/L 4.7 4.3 4.4   CHLORIDE mmol/L 98 96* 96*   CO2 mmol/L 32.0* 27.0 30.0*   BUN mg/dL 73* 74* 86*   CREATININE mg/dL 2.64* 2.71* 2.97*   GLUCOSE mg/dL 194* 215* 139*      Lab Results   Component Value Date    CHOL 180 08/04/2022    TRIG 160 (H) 08/04/2022    HDL 39 (L) 08/04/2022     (H) 08/04/2022    AST 12 08/13/2022    ALT 13 08/13/2022                       Intake/Output Summary (Last 24 hours) at 8/23/2022 0937  Last data filed at 8/23/2022 0900  Gross per 24 hour   Intake 1020 ml   Output 1800 ml   Net -780 ml       I personally reviewed the " patient's EKG/Telemetry data    Radiology Data:   CXR 8/14/22:  IMPRESSION:     Increased patchy bibasilar opacities     Cardiomegaly and pulmonary vascular congestion.    Current Medications:  amLODIPine, 10 mg, Oral, Daily  amoxicillin-clavulanate, 1 tablet, Oral, Q12H  apixaban, 5 mg, Oral, Q12H  aspirin, 81 mg, Oral, Daily  bethanechol, 10 mg, Oral, TID  bumetanide, 2 mg, Intravenous, Daily  cetirizine, 5 mg, Oral, Daily  insulin detemir, 15 Units, Subcutaneous, Nightly  insulin lispro, 0-9 Units, Subcutaneous, TID AC  Insulin Lispro, 5 Units, Subcutaneous, TID With Meals  levothyroxine, 50 mcg, Oral, Q AM  metoprolol succinate XL, 50 mg, Oral, Q24H  polyethylene glycol, 17 g, Oral, Daily  senna-docusate sodium, 2 tablet, Oral, BID  sodium chloride, 10 mL, Intravenous, Q12H  tamsulosin, 0.4 mg, Oral, Daily  vitamin D3, 5,000 Units, Oral, Daily               ASSESSMENT:  PVDz with remote Soy iliac stenting, good collateral flow.  Osteomyelitis left Great toe  CAD  HTN  [place]  Acute CHF  Hypoxia  DMII  BLE DVTs      PLAN:  -  POD 22 p Left grt toe transmetatarsal amputation.  Continue PT/OT, antibiotics per ID.  Now on oral Augmentin.  -  Mildly elevated troponin from demand ischemia with acute CHF. No acute EKG changes.  Continue Beta Blocker, CCB.  Repeat echo, EF likely closer to 40%.  -   Cr improving, today's labs pending - will review.  Nephrology following and managing diuresis.  Renal ultrasound shows no obstruction and NO WILMA by ultrasound.  Appreciate their assistance.  - Eliquis per DVT protocol.  -  Improvement seen in respiratory status with diuresis. Improved mentation.    -  Okay for DC to rehab from cardiology standpoint.  Hopefully later this week. Recommend DC on the following cardiac meds:  Amlodipine 10mg PO daily, Eliquis 5mg PO twice daily, ASA 81mg PO daily, Toprol XL 50mg PO daily.  Diuretics per Nephrology.        Megan Shukla PA-C working with Dr. Coopre Apodaca  09:38 AM  EDT  08/23/2022

## 2022-08-23 NOTE — THERAPY PROGRESS REPORT/RE-CERT
Patient Name: Jermaine Singh  : 1945    MRN: [id number]                              Today's Date: 2022       Admit Date: 2022    Visit Dx:     ICD-10-CM ICD-9-CM   1. Dislocation of left shoulder joint, initial encounter  S43.005A 831.00   2. Cellulitis of left foot  L03.116 682.7   3. 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  Preserved LV function   • Type 2 diabetes mellitus (HCC)   • Hypertension   • Hyperlipidemia   • Hypothyroidism (acquired)   • Vitamin D deficiency on Rx    • Serum Cr 1.32 on admission.  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 Au   • Dizziness   • Atherosclerosis of native artery of both lower extremities with intermittent claudication (Formerly [facility])   • SSS (sick sinus syndrome) (Formerly [facility])   • Dislocation of left shoulder joint, initial encounter   • Cellulitis in diabetic foot (Formerly [facility])   • Fall   • Lactic acidosis   • Proximal phalanx fracture of the second digit extending into the second metatarsal joint   • Acute renal failure (ARF) (Formerly [facility])   • Acute systolic heart failure (Formerly [facility])   • DVT, bilateral lower limbs (Formerly [facility])   • Acute respiratory failure with hypoxia (Formerly [facility])     Past Medical History:   Diagnosis Date   • Asthma    • Coronary artery disease    • Diabetes mellitus (HCC) 2000    started on in[place]lin 2018; started on po meds in ; checking blood sugars daily    • Disease of thyroid gland     po meds daily for hypothyroidism    • History of fracture as a child     rt leg- severe    • Hyperlipidemia    • Hypertension    • Hypothyroidism    • Peripheral neuropathy    • Peripheral vascular disease (HCC)     s/p angiogram -needs stent in left leg    • RBBB    • Right knee pain    • Vitamin D deficiency      Past Surgical History:   Procedure Laterality Date   • APPENDECTOMY     • CARDIAC CATHETERIZATION N/A 2019     Procedure: Left Heart Cath;  Surgeon: Cooper Apodaca MD;  Location:  [facility];  Service: Cardiology   • CARDIAC ELECTROPHYSIOLOGY PROCEDURE N/A 5/18/2022    Procedure: DEVICE IMPLANT;  Surgeon: Cooper Apodaca MD;  Location:  [facility];  Service: Cardiology;  Laterality: N/A;   • COLONOSCOPY     • CORONARY ARTERY BYPASS GRAFT N/A 3/22/2019    Procedure: MEDIAN STERNOTOMY, CORONARY ARTERY BYPASS GRAFT X3, UTILIZING THE LEFT INTERNAL MAMMARY ARTERY, EVH AND OPEN HARVEST OF THE RIGHT GREATER SAPHENOUS VEIN, EXPLORATION OF THE LEFT LEG;  Surgeon: Ap Au MD;  Location:  [facility];  Service: Cardiothoracic   • EYE SURGERY Bilateral     cataracts    • INTERVENTIONAL RADIOLOGY PROCEDURE N/A 7/29/2021    Procedure: Abdominal Aortagram with Runoff;  Surgeon: Jermaine Hernandez MD;  Location:  [facility];  Service: Cardiovascular;  Laterality: N/A;   • KNEE ARTHROSCOPY      right x 2, left x 1   • LACERATION REPAIR      right leg   • LEG SURGERY      2 for fracture of rt leg    • TONSILLECTOMY      Adnoidectomy   • TRANS METATARSAL AMPUTATION Left 8/2/2022    Procedure: GREAT TOE AMPUTATION LEFT;  Surgeon: Gopal Márquez MD;  Location:  [facility];  Service: Vascular;  Laterality: Left;      General Information     Row Name 08/23/22 1452          Physical Therapy Time and Intention    Document Type therapy note (daily note)  -LR     Mode of Treatment physical therapy;individual therapy  -LR     Row Name 08/23/22 1452          General Information    Patient Profile Reviewed yes  -LR     Existing Precautions/Restrictions fall;left;non-weight bearing;other (see comments);oxygen therapy device and L/min   WBAT LUE- no ABD or ER; NWB LLE with post-op shoe  -LR     Barriers to Rehab previous functional deficit;medically complex  -LR     Row Name 08/23/22 1456          Cognition    Orientation Status (Cognition) oriented x 3  -LR     Row Name 08/23/22  1458          Safety Issues, Functional Mobility    Safety Issues Affecting Function (Mobility) safety precautions follow-through/compliance;safety precaution awareness;awareness of need for assistance;insight into deficits/self-awareness;positioning of assistive device;ability to follow commands;at risk behavior observed;impulsivity;judgment;problem-solving;sequencing abilities  -LR     Impairments Affecting Function (Mobility) balance;strength;cognition;endurance/activity tolerance;postural/trunk control;pain;range of motion (ROM);motor control;motor planning  -           User Key  (r) = Recorded By, (t) = Taken By, (c) = Cosigned By    Initials Name Provider Type    LR Rukhsana Renee, PT Physical Therapist               Mobility     Row Name 08/23/22 1452          Bed Mobility    Bed Mobility supine-sit  -LR     Supine-Sit [place] (Bed Mobility) verbal cues;minimum assist (75% patient effort)  -LR     Sit-Supine [place] (Bed Mobility) verbal cues;minimum assist (75% patient effort)  -LR     Assistive Device (Bed Mobility) head of bed elevated;bed rails;draw sheet  -LR     Comment, (Bed Mobility) Verbal cues to move LEs towards EOB and to push up from bed to raise trunk into sitting. Min assist to pull trunk into sitting. Mild dizziness upon sitting up. Improved with rest. Verbal cues to push from bed to scoot hips back into bed. Min assist to lift LEs into bed.  -     Row Name 08/23/22 1452          Transfers    Comment, (Transfers) Verbal cues to push up from bed to stand and to reach back for bed to lower into sitting. Verbal cues to keep L LE out before t/f for NWB status. Patient would weight bear during transiton to standing but with repeated cues was able to pick L foot off floor and maintain NWB on L LE with static standing. Performed x2. Improved ability to maintain NWB on L LE in static standing on 2nd attempt. Patient plopped back into sitting EOB. Cues to lower into sitting.  Patient c/o worsening dizziness after second stand, assisted back to supine in bed.  T/f from bed to chair via overhead lift d/t patient being unable to maintain NWB on R foot for t/f to chair.  -LR     Row Name 08/23/22 1452          Bed-Chair Transfer    Bed-Chair [place] (Transfers) verbal cues;dependent (less than 25% patient effort);2 person assist  -LR     Assistive Device (Bed-Chair Transfers) lift device  -LR     Row Name 08/23/22 1452          Sit-Stand Transfer    Sit-Stand [place] (Transfers) verbal cues;moderate assist (50% patient effort);2 person assist  -LR     Assistive Device (Sit-Stand Transfers) walker, front-wheeled  -LR     Row Name 08/23/22 1452          Gait/Stairs (Locomotion)    [place] Level (Gait) unable to assess  -LR     [place] Level (Stairs) not tested  -LR     Comment, (Gait/Stairs) Patient unable to hop on R foot for gait training.  -LR     Row Name 08/23/22 1452          Mobility    Extremity Weight-bearing Status left upper extremity;left lower extremity  -LR     Left Upper Extremity (Weight-bearing Status) weight-bearing as tolerated (WBAT)  -LR     Left Lower Extremity (Weight-bearing Status) non weight-bearing (NWB)   -LR           User Key  (r) = Recorded By, (t) = Taken By, (c) = Cosigned By    Initials Name Provider Type    Rukhsana Monge, PT Physical Therapist               Obj/Interventions     Row Name 08/23/22 1452          Range of Motion Comprehensive    Comment, General Range of Motion R hip and knee AROM WFL, R ankle DF AROM impaired 100%; L hip and knee AROM WFL; L ankle AROM impaired 50% d/t splint/shoe  -LR     Row Name 08/23/22 1452          Strength Comprehensive (MMT)    General Manual Muscle Testing (MMT) Assessment lower extremity strength deficits identified  -LR     Comment, General Manual Muscle Testing (MMT) Assessment R hip flexors: 3-/5, L hip flexors: 4-/5, R knee extensors/flexors: 4/5, L knee extensors/flexors: 4+/5,  unable to actively DF R foot, deferred asssessment of L foot d/t recent sx/post shoe  -LR     Row Name 08/23/22 1452          Motor Skills    Therapeutic Exercise ankle;knee;hip;other (see comments)  cues for technique; no assist required  -LR     Row Name 08/23/22 1452          Hip (Therapeutic Exercise)    Hip (Therapeutic Exercise) strengthening exercise;isometric exercises  -     Hip Isometrics (Therapeutic Exercise) bilateral;gluteal sets;sitting;10 repetitions;aDduction  hip adduction pillow squeezes  -     Hip Strengthening (Therapeutic Exercise) bilateral;aBduction;marching while seated;sitting;10 repetitions  -LR     Row Name 08/23/22 1452          Knee (Therapeutic Exercise)    Knee (Therapeutic Exercise) strengthening exercise  -LR     Knee Strengthening (Therapeutic Exercise) bilateral;LAQ (long arc quad);sitting;10 repetitions  -     Row Name 08/23/22 1452          Ankle (Therapeutic Exercise)    Ankle (Therapeutic Exercise) AROM (active range of motion)  -     Ankle AROM (Therapeutic Exercise) bilateral;dorsiflexion;plantarflexion;supine;10 repetitions  -     Row Name 08/23/22 1452          Balance    Balance Assessment sitting static balance;sitting dynamic balance;standing static balance;standing dynamic balance  -LR     Static Sitting Balance contact guard  -LR     Dynamic Sitting Balance minimal assist  -LR     Position, Sitting Balance unsupported;sitting edge of bed  -LR     Static Standing Balance moderate assist;2-person assist  -LR     Dynamic Standing Balance moderate assist;2-person assist  -LR     Position/Device Used, Standing Balance supported;walker, rolling  -LR     Comment, Balance stood from EOB x2 for 15-30 seconds, sat EOB for 10 minutes  -LR           User Key  (r) = Recorded By, (t) = Taken By, (c) = Cosigned By    Initials Name Provider Type    LR Rukhsana Renee, PT Physical Therapist               Goals/Plan     Row Name 08/23/22 1452          Bed Mobility Goal  1 (PT)    Activity/Assistive Device (Bed Mobility Goal 1, PT) sit to supine/supine to sit  -LR     [place] Level/Cues Needed (Bed Mobility Goal 1, PT) independent;modified independence  -LR     Time Frame (Bed Mobility Goal 1, PT) long term goal (LTG);10 days  -LR     Progress/Outcomes (Bed Mobility Goal 1, PT) continuing progress toward goal;goal ongoing  -LR     Row Name 08/23/22 1452          Transfer Goal 1 (PT)    Activity/Assistive Device (Transfer Goal 1, PT) sit-to-stand/stand-to-sit;bed-to-chair/chair-to-bed;walker, rolling  -LR     [place] Level/Cues Needed (Transfer Goal 1, PT) minimum assist (75% or more patient effort)  -LR     Time Frame (Transfer Goal 1, PT) long term goal (LTG);10 days  -LR     Progress/Outcome (Transfer Goal 1, PT) progress slower than expected;goal ongoing;goal revised this date  -LR     Row Name 08/23/22 1452          Gait Training Goal 1 (PT)    Activity/Assistive Device (Gait Training Goal 1, PT) gait (walking locomotion);walker, rolling  -LR     [place] Level (Gait Training Goal 1, PT) moderate assist (50-74% patient effort);other (see comments)  x2  -LR     Distance (Gait Training Goal 1, PT) 50 feet  -LR     Time Frame (Gait Training Goal 1, PT) long term goal (LTG);10 days  -LR     Progress/Outcome (Gait Training Goal 1, PT) progress slower than expected;goal ongoing;goal revised this date  -LR     Row Name 08/23/22 8782          Therapy Assessment/Plan (PT)    Planned Therapy Interventions (PT) balance training;bed mobility training;gait training;home exercise program;patient/family education;ROM (range of motion);strengthening;transfer training  -LR           User Key  (r) = Recorded By, (t) = Taken By, (c) = Cosigned By    Initials Name Provider Type    LR Rukhsana Renee, PT Physical Therapist               Clinical Impression     Row Name 08/23/22 5729          Pain    Pretreatment Pain Rating 0/10 - no pain  -LR     Posttreatment Pain Rating 0/10  - no pain  -LR     Pain Intervention(s) Ambulation/increased activity;Repositioned  -LR     Row Name 08/23/22 1452          Plan of Care Review    Plan of Care Reviewed With patient  -LR     Progress improving  -LR     Outcome Evaluation Patient stood from EOB x2 with mod assist x2. Patient still having difficulty maintaining NWB on L LE during t/f but today with repeated cues and education, was able to maintain NWB on L LE once static standing at EOB. Improved static sitting balance at EOB. Will continue to progress as able. Continue to recommend IRF at d/c.  -LR     Row Name 08/23/22 1452          Therapy Assessment/Plan (PT)    Rehab Potential (PT) fair, will monitor progress closely  -LR     Criteria for Skilled Interventions Met (PT) yes;meets criteria;skilled treatment is necessary  -LR     Therapy Frequency (PT) daily  -LR     Row Name 08/23/22 1452          Positioning and Restraints    Pre-Treatment Position in bed  -LR     Post Treatment Position chair  -LR     In Chair notified nsg;reclined;call light within reach;sitting;encouraged to call for assist;exit alarm on;with family/caregiver;legs elevated;waffle cushion;on mechanical lift sling  -LR           User Key  (r) = Recorded By, (t) = Taken By, (c) = Cosigned By    Initials Name Provider Type    LR Rukhsana Renee, PT Physical Therapist               Outcome Measures     Row Name 08/23/22 1452          How much help from another person do you currently need...    Turning from your back to your side while in flat bed without using bedrails? 3  -LR     Moving from lying on back to sitting on the side of a flat bed without bedrails? 3  -LR     Moving to and from a bed to a chair (including a wheelchair)? 1  -LR     Standing up from a chair using your arms (e.g., wheelchair, bedside chair)? 2  -LR     Climbing 3-5 steps with a railing? 1  -LR     To walk in hospital room? 1  -LR     AM-PAC 6 Clicks Score (PT) 11  -LR     Highest level of mobility 4  --> Transferred to chair/commode  -LR     Row Name 08/23/22 1452          Functional Assessment    Outcome Measure Options AM-PAC 6 Clicks Basic Mobility (PT)  -LR           User Key  (r) = Recorded By, (t) = Taken By, (c) = Cosigned By    Initials Name Provider Type    Rukhsana Monge, PT Physical Therapist                             Physical Therapy Education                 Title: PT OT SLP Therapies (In Progress)     Topic: Physical Therapy (In Progress)     Point: Mobility training (In Progress)     Learning Progress Summary           Patient Acceptance, E,D, VU,NR by LR at 8/23/2022 1452    Comment: Educated on precautions, NWB status of L LE, safety with mobility, LE HEP, correct supine<->sit t/f technique, correct sit<->stand t/f technique, and progression of POC.   Family Acceptance, E,D, NR by HP at 8/22/2022 1808      Show all documentation for this point (17)                 Point: Home exercise program (In Progress)     Learning Progress Summary           Patient Acceptance, E,D, VU,NR by LR at 8/23/2022 1452    Comment: Educated on precautions, NWB status of L LE, safety with mobility, LE HEP, correct supine<->sit t/f technique, correct sit<->stand t/f technique, and progression of POC.   Family Acceptance, E,D, NR by HP at 8/22/2022 1808      Show all documentation for this point (17)                 Point: Body mechanics (In Progress)     Learning Progress Summary           Patient Acceptance, E,D, VU,NR by LR at 8/23/2022 1452    Comment: Educated on precautions, NWB status of L LE, safety with mobility, LE HEP, correct supine<->sit t/f technique, correct sit<->stand t/f technique, and progression of POC.   Family Acceptance, E,D, NR by HP at 8/22/2022 1808      Show all documentation for this point (17)                 Point: Precautions (In Progress)     Learning Progress Summary           Patient Acceptance, E,D, VU,NR by LR at 8/23/2022 1452    Comment: Educated on precautions, NWB  status of L LE, safety with mobility, LE HEP, correct supine<->sit t/f technique, correct sit<->stand t/f technique, and progression of POC.   Family Acceptance, E,D, NR by HP at 8/22/2022 1808      Show all documentation for this point (17)                             User Key     Initials Effective Dates Name Provider Type Discipline    LR 06/16/21 -  Rukhsana Renee, PT Physical Therapist PT    HP 06/01/21 -  Thelma Rich, SETH Physical Therapist PT              PT Recommendation and Plan  Planned Therapy Interventions (PT): balance training, bed mobility training, gait training, home exercise program, patient/family education, ROM (range of motion), strengthening, transfer training  Plan of Care Reviewed With: patient  Progress: improving  Outcome Evaluation: Patient stood from EOB x2 with mod assist x2. Patient still having difficulty maintaining NWB on L LE during t/f but today with repeated cues and education, was able to maintain NWB on L LE once static standing at EOB. Improved static sitting balance at EOB. Will continue to progress as able. Continue to recommend IRF at d/c.     Time Calculation:    PT Charges     Row Name 08/23/22 1452             Time Calculation    Start Time 1452  -LR      PT Received On 08/23/22  -LR      PT Goal Re-Cert Due Date 09/02/22  -LR              Timed Charges    62844 - PT Therapeutic Exercise Minutes 8  -LR      09590 - PT Therapeutic Activity Minutes 17  -LR              Total Minutes    Timed Charges Total Minutes 25  -LR       Total Minutes 25  -LR            User Key  (r) = Recorded By, (t) = Taken By, (c) = Cosigned By    Initials Name Provider Type    LR Rukhsana Renee, PT Physical Therapist              Therapy Charges for Today     Code Description Service Date Service Provider Modifiers Qty    07353502142 HC PT THER PROC EA 15 MIN 8/23/2022 Rukhsana Renee, PT GP 1    34092017644 HC PT THERAPEUTIC ACT EA 15 MIN 8/23/2022 Rukhsana Renee  Melissa, PT GP 1    [id number]  PT THER SUPP EA 15 MIN 8/23/2022 Rukhsana Renee, PT GP 2          PT G-Codes  Outcome Measure Options: AM-PAC 6 Clicks Basic Mobility (PT)  AM-PAC 6 Clicks Score (PT): 11  AM-PAC 6 Clicks Score (OT): 13    Rukhsana Renee, PT  8/23/2022

## 2022-08-23 NOTE — PLAN OF CARE
Goal Outcome Evaluation:  Plan of Care Reviewed With: patientpatient, significant other A&Ox4 VSS. Paced on tele.Repositioned frequently for comfort by staff. BLE elevated off bed.LLE  in surgical shoe. Pain controlled. FC patent to BSD. Wife present at bedside throughout shift.

## 2022-08-23 NOTE — PROGRESS NOTES
[facility] Medicine Services  PROGRESS NOTE    Patient Name: Jermaine Singh  : 1945  MRN: [id number]    Date of Admission: 2022  Primary Care Physician: Farooq Painter MD    Subjective   Subjective     CC:  Follow-up renal failure    HPI:  Per nursing patient appears more energetic and alert this morning.  Wife at bedside, states that he looks better to her today too.  Labs ordered for this morning not drawn/resulted.  Briefly on O2 while sleeping last night but back on room air    ROS:  Gen- No fevers, chills  CV- No chest pain, palpitations  Resp- No cough, dyspnea  GI- No N/V/D, abd pain     Objective   Objective     Vital Signs:   Temp:  [97.1 °F (36.2 °C)-97.9 °F (36.6 °C)] 97.5 °F (36.4 °C)  Heart Rate:  [66-69] 66  Resp:  [17-18] 18  BP: ()/(64-84) 126/79  Flow (L/min):  [1-2] 2     Physical Exam:  Constitutional: Awake, alert, lying in bed in no acute distress  HENT: NCAT, mucous membranes moist  Respiratory: Clear to auscultation bilaterally, respiratory effort normal   Cardiovascular: RRR, no murmurs, rubs, or gallops, palpable radial pulses  Gastrointestinal: Positive bowel sounds, soft, nontender, nondistended  Musculoskeletal: LT foot with postoperative bandaging  Psychiatric: Appropriate affect, cooperative  Neurologic: Speech clear and fluent, moving extremities spontaneously    Results Reviewed:  LAB RESULTS:      Lab 22  0646 22  0639 22  0333   WBC 9.20 9.07 11.20*   HEMOGLOBIN 10.1* 9.8* 10.7*   HEMATOCRIT 30.2* 28.8* 31.9*   PLATELETS 163 157 160   MCV 87.3 86.2 87.2         Lab 22  0656 22  0646 22  0639 22  0333 22  0327   SODIUM 138 137 136 133* 135*   POTASSIUM 4.7 4.3 4.4 4.4 4.1   CHLORIDE 98 96* 96* 93* 96*   CO2 32.0* 27.0 30.0* 24.0 26.0   ANION GAP 8.0 14.0 10.0 16.0* 13.0   BUN 73* 74* 86* 85* 82*   CREATININE 2.64* 2.71* 2.97* 3.23* 3.17*   EGFR 24.2* 23.4* 21.0* 19.0* 19.4*   GLUCOSE 194*  215* 139* 225* 63*   CALCIUM 8.4* 8.1* 8.2* 8.1* 8.3*   PHOSPHORUS 4.5  --   --   --   --          Lab 08/22/22  0656   ALBUMIN 3.00*                     Brief Urine Lab Results  (Last result in the past 365 days)      Color   Clarity   Blood   Leuk Est   Nitrite   Protein   CREAT   Urine HCG        08/14/22 1818             149.9               Microbiology Results Abnormal     Procedure Component Value - Date/Time    Eosinophil Smear - Urine, Urine, Clean Catch [[id number]]  (Normal) Collected: 08/14/22 1818    Lab Status: Final result Specimen: Urine, Clean Catch Updated: 08/14/22 2101     Eosinophil Smear 0 % EOS/100 Cells     Narrative:      No eosinophil seen    Anaerobic Culture - Tissue, Toe, Left [[id number]] Collected: 08/02/22 1027    Lab Status: Final result Specimen: Tissue from Toe, Left Updated: 08/07/22 0858     Anaerobic Culture No anaerobes isolated at 5 days    Blood Culture - Blood, Hand, Left [[id number]]  (Normal) Collected: 07/27/22 2040    Lab Status: Final result Specimen: Blood from Hand, Left Updated: 08/01/22 2132     Blood Culture No growth at 5 days    Blood Culture - Blood, Hand, Right [[id number]]  (Normal) Collected: 07/27/22 2030    Lab Status: Final result Specimen: Blood from Hand, Right Updated: 08/01/22 2132     Blood Culture No growth at 5 days    COVID PRE-OP / PRE-PROCEDURE SCREENING ORDER (NO ISOLATION) - Swab, Nasopharynx [[id number]]  (Normal) Collected: 07/27/22 2246    Lab Status: Final result Specimen: Swab from Nasopharynx Updated: 07/27/22 2357    Narrative:      The following orders were created for panel order COVID PRE-OP / PRE-PROCEDURE SCREENING ORDER (NO ISOLATION) - Swab, Nasopharynx.  Procedure                               Abnormality         Status                     ---------                               -----------         ------                     COVID-19 and FLU A/B PCR...[[id number]]  Normal              Final result                 Please view results for  these tests on the individual orders.    COVID-19 and FLU A/B PCR - Swab, Nasopharynx [[id number]]  (Normal) Collected: 07/27/22 2246    Lab Status: Final result Specimen: Swab from Nasopharynx Updated: 07/27/22 1147     COVID19 Not Detected     Influenza A PCR Not Detected     Influenza B PCR Not Detected    Narrative:      Fact sheet for providers: https://www.fda.gov/media/355949/download    Fact sheet for patients: https://www.fda.gov/media/543943/download    Test performed by PCR.          No radiology results from the last 24 hrs    Results for orders placed during the hospital encounter of 07/27/22    Adult Transthoracic Echo Complete W/ Cont if Necessary Per Protocol    Interpretation Summary  · Left ventricular ejection fraction appears to be 46 - 50%. Left ventricular systolic function is low normal.  · Left ventricular septal hypokinesis  · No significant valvular heart disease      I have reviewed the medications:  Scheduled Meds:amLODIPine, 10 mg, Oral, Daily  amoxicillin-clavulanate, 1 tablet, Oral, Q12H  apixaban, 5 mg, Oral, Q12H  aspirin, 81 mg, Oral, Daily  bethanechol, 10 mg, Oral, TID  bumetanide, 2 mg, Intravenous, Daily  cetirizine, 5 mg, Oral, Daily  insulin detemir, 15 Units, Subcutaneous, Nightly  insulin lispro, 0-9 Units, Subcutaneous, TID AC  Insulin Lispro, 5 Units, Subcutaneous, TID With Meals  levothyroxine, 50 mcg, Oral, Q AM  metoprolol succinate XL, 50 mg, Oral, Q24H  polyethylene glycol, 17 g, Oral, Daily  senna-docusate sodium, 2 tablet, Oral, BID  sodium chloride, 10 mL, Intravenous, Q12H  tamsulosin, 0.4 mg, Oral, Daily  vitamin D3, 5,000 Units, Oral, Daily      Continuous Infusions:   PRN Meds:.•  acetaminophen **OR** acetaminophen **OR** acetaminophen  •  benzocaine-menthol  •  bisacodyl  •  calcium carbonate  •  dextrose  •  dextrose  •  glucagon (human recombinant)  •  HYDROcodone-acetaminophen  •  ipratropium-albuterol  •  nitroglycerin  •  ondansetron **OR** ondansetron  •   phenol  •  prochlorperazine  •  sennosides-docusate  •  simethicone  •  sodium chloride    Assessment & Plan   Assessment & Plan     Active Hospital Problems    Diagnosis  POA   • **Acute renal failure (ARF) (formerly [facility]) [N17.9]  Yes   • Acute systolic heart failure (formerly [facility]) [I50.21]  No   • DVT, bilateral lower limbs (formerly [facility]) [I82.403]  No   • Acute respiratory failure with hypoxia (formerly [facility]) [J96.01]  No   • Proximal phalanx fracture of the second digit extending into the second metatarsal joint [S92.919A]  Yes   • Dislocation of left shoulder joint, initial encounter [S43.005A]  Yes   • Cellulitis in diabetic foot (formerly [facility]) [E11.628, L03.119]  Yes   • Fall [W19.XXXA]  Yes   • SSS (sick sinus syndrome) (formerly [facility]) [I49.5]  Yes   • Hyperlipidemia [E78.5]  Yes   • Hypertension [I10]  Yes   • S/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 Au [Z95.1]  Not Applicable   • Hypothyroidism (acquired) [E03.9]  Yes   • Type 2 diabetes mellitus (formerly [facility]) [E11.9]  Yes      Resolved Hospital Problems    Diagnosis Date Resolved POA   • Sepsis (formerly [facility]) [A41.9] 08/21/2022 Yes        Brief Hospital Course to date:  Jermaine Singh is a 77 y.o. male w/ DM2, CAD s/p prior CABG, PVD s/p RT LE angioplasty 7/2021 who initially presented to the ED 7/27/22 after sustaining a fall while walking his dog, identified to have LT shoulder dislocation reduced in the ED, at that time he had systemic markers for illness, admitted for concern of osteomyelitis and started on empiric IV antibiotics; he is now s/p LT first toe amputation 8/2 w/ Dr. Márquez; his hospitalization has been prolonged/complicated by acute pulmonary disease with marked hypoxia, and significant renal dysfunction; he has been followed by ID, nephrology, cardiology with slow but gradual recovery    Assessment/plan    Acute renal failure on likely CKD 2  -Base eGFR 60-70s;Cr 0.9-1.2  - Renal US unrevealing, renal arterial duplex w/o evidence for stenosis  -Creatinine peaked 3.23  -Nephrology follows, continue IV diuresis per  their recommendations  - Labs pending late this morning    Acute hypoxic respiratory insufficiency, improved  Acute HFrEF  -Cardiology following, interpreted EF 40%  - VQ scan interpreted as low probability  - Oral Toprol-XL; other meds limited 2/2 renal disease; cards following  -Renal failure likely a contributing factor  -IV Bumex per nephrology  -Stable on room air today    LT first toe osteomyelitis/cellulitis, polymicrobial, POA  PAD s/p prior angioplasty to RT LE  -Follows w/ Dr. Joseph (podiatry) opt; on PO doxy PTA  -Arterial duplex strongly suggestive of PAD; no revasc per sgy/vasc team  - Tissue Cx w/ Enterococcus faecalis/casseliflavus  -S/p LT first toe amputation 8/2 w/ Dr. Márquez  -Lost PICC line 8/18; no replacement 2/2 renal disease  -S/p IV Unasyn, now on oral Augmentin due to loss of IV access, planned through 9/13/22 (x6-week postoperative)    Bilateral LE DVTs  -Continue Eliquis    Fall w/ anterior LT shoulder dislocation, reduced in ED  -Orthopedics (Dr. Gipson) has seen, recommend nonoperative management, sling for comfort  -Per Ortho: Shoulder ROM as tolerated, avoid abduction+ external rotation, continue PT/OT, follow-up outpatient  - Approaching timeline for outpatient follow-up, patient remains hospitalized, may ask Ortho for brief follow-up PTD for rehab recommendations    Acute urinary retention  -cont bethanechol, Flomax  - FC placed 8/18 per renal  - Voiding trial after aggressive IV diuresis     DM type 2, A1c 9.0%, with long-term use of insulin  -Basal bolus insulin, Accu-Cheks w/ SSI    Hypertension  CAD s/p prior CABG  SSS s/p PPM  - Next device check 12/28/22  -cont Toprol-XL, amlodipine, aspirin  - ACEi on hold 2/2 renal disease    Hypothyroidism  -Levothyroxine    Constipation w/ N/V  -Improved?, Continue bowel regimen    Expected Discharge Location and Transportation: [facility] rehab  Expected Discharge Date: 8/25    DVT prophylaxis:  Medical and mechanical DVT prophylaxis  orders are present.     AM-PAC 6 Clicks Score (PT): 10 (08/22/22 1808)    CODE STATUS:   Code Status and Medical Interventions:   Ordered at: 07/28/22 0000     Code Status (Patient has no pulse and is not breathing):    CPR (Attempt to Resuscitate)     Medical Interventions (Patient has pulse or is breathing):    Full Support       Navneet Venegas, DO  08/23/22

## 2022-08-23 NOTE — PROGRESS NOTES
Cardiothoracic Surgery Progress Note      POD # 22 -left great toe transmetatarsal amputation primary closure     LOS: 27 days      Subjective:   No acute events overnight.  States his toe continues to feel better.       Objective:  Vital Signs vital signs below noted T-max past 24 hours 97.8 °F  Temp:  [97.1 °F (36.2 °C)-97.9 °F (36.6 °C)] 97.5 °F (36.4 °C)  Heart Rate:  [66-69] 66  Resp:  [17-18] 18  BP: ()/(64-84) 126/79    Physical Exam:   General Appearance: Awake and alert.   Lungs:   Heart:   Skin: Ulceration middle phalangeal joint left second toe with exposed phalangeal bone/joint space   Incision: Incision is clean, dry and intact with skin margins viable and sutures intact.      Results:  Results from last 7 days   Lab Units 08/21/22  0646   WBC 10*3/mm3 9.20   HEMOGLOBIN g/dL 10.1*   HEMATOCRIT % 30.2*   PLATELETS 10*3/mm3 163     Results from last 7 days   Lab Units 08/22/22  0656   SODIUM mmol/L 138   POTASSIUM mmol/L 4.7   CHLORIDE mmol/L 98   CO2 mmol/L 32.0*   BUN mg/dL 73*   CREATININE mg/dL 2.64*   GLUCOSE mg/dL 194*   CALCIUM mg/dL 8.4*         Assessment: #1.  Postop day 22 left great toe transmetatarsal amputation primary closure overall healing well with some ischemia at the proximal third  To expose middle phalangeal joint space with exposed bone secondary to hammertoe phenomena and chronic diabetic ulceration  3 status post remote coronary bypass grafting  4 status post remote pacemaker placement for sick sinus syndrome  5.  Recent fall with rib shoulder dislocation reduced in the emergency department on this admission  6.  Endovascular angioplasty stenting right lower extremity per Dr. Hernandez 2019        Plan: Continue daily dressing changes amputation site as well as over the ulceration the middle phalangeal joint space of the left second toe.  Antibiotics infectious disease.  Medical manage per hospitalist/cardiologist.  Renal medicine following closely for increasing creatinine  and BUN.  Cleared for discharge from a surgical perspective per primary service.      RANULFO Juárez - 08/23/22 - 08:20 EDT

## 2022-08-24 LAB
GLUCOSE BLDC GLUCOMTR-MCNC: 116 MG/DL (ref 70–130)
GLUCOSE BLDC GLUCOMTR-MCNC: 137 MG/DL (ref 70–130)
GLUCOSE BLDC GLUCOMTR-MCNC: 151 MG/DL (ref 70–130)
GLUCOSE BLDC GLUCOMTR-MCNC: 156 MG/DL (ref 70–130)

## 2022-08-24 PROCEDURE — 63710000001 INSULIN DETEMIR PER 5 UNITS: Performed by: FAMILY MEDICINE

## 2022-08-24 PROCEDURE — 99231 SBSQ HOSP IP/OBS SF/LOW 25: CPT | Performed by: ORTHOPAEDIC SURGERY

## 2022-08-24 PROCEDURE — 97535 SELF CARE MNGMENT TRAINING: CPT | Performed by: OCCUPATIONAL THERAPIST

## 2022-08-24 PROCEDURE — 94799 UNLISTED PULMONARY SVC/PX: CPT

## 2022-08-24 PROCEDURE — 97110 THERAPEUTIC EXERCISES: CPT

## 2022-08-24 PROCEDURE — 99024 POSTOP FOLLOW-UP VISIT: CPT | Performed by: STUDENT IN AN ORGANIZED HEALTH CARE EDUCATION/TRAINING PROGRAM

## 2022-08-24 PROCEDURE — 82962 GLUCOSE BLOOD TEST: CPT

## 2022-08-24 PROCEDURE — 97110 THERAPEUTIC EXERCISES: CPT | Performed by: OCCUPATIONAL THERAPIST

## 2022-08-24 PROCEDURE — 63710000001 INSULIN LISPRO (HUMAN) PER 5 UNITS: Performed by: FAMILY MEDICINE

## 2022-08-24 PROCEDURE — 97530 THERAPEUTIC ACTIVITIES: CPT

## 2022-08-24 PROCEDURE — 99232 SBSQ HOSP IP/OBS MODERATE 35: CPT | Performed by: INTERNAL MEDICINE

## 2022-08-24 RX ADMIN — BUMETANIDE 2 MG: 0.25 INJECTION INTRAMUSCULAR; INTRAVENOUS at 08:17

## 2022-08-24 RX ADMIN — TAMSULOSIN HYDROCHLORIDE 0.4 MG: 0.4 CAPSULE ORAL at 08:16

## 2022-08-24 RX ADMIN — IPRATROPIUM BROMIDE AND ALBUTEROL SULFATE 3 ML: .5; 3 SOLUTION RESPIRATORY (INHALATION) at 03:15

## 2022-08-24 RX ADMIN — AMOXICILLIN AND CLAVULANATE POTASSIUM 500 MG: 500; 125 TABLET, FILM COATED ORAL at 21:29

## 2022-08-24 RX ADMIN — IPRATROPIUM BROMIDE AND ALBUTEROL SULFATE 3 ML: .5; 3 SOLUTION RESPIRATORY (INHALATION) at 21:00

## 2022-08-24 RX ADMIN — ACETAMINOPHEN 649.6 MG: 650 SOLUTION ORAL at 17:46

## 2022-08-24 RX ADMIN — Medication 5000 UNITS: at 08:17

## 2022-08-24 RX ADMIN — CETIRIZINE HYDROCHLORIDE 5 MG: 10 TABLET, FILM COATED ORAL at 08:17

## 2022-08-24 RX ADMIN — INSULIN DETEMIR 15 UNITS: 100 INJECTION, SOLUTION SUBCUTANEOUS at 21:29

## 2022-08-24 RX ADMIN — ACETAMINOPHEN 649.6 MG: 650 SOLUTION ORAL at 08:50

## 2022-08-24 RX ADMIN — BETHANECHOL CHLORIDE 10 MG: 10 TABLET ORAL at 18:52

## 2022-08-24 RX ADMIN — APIXABAN 5 MG: 5 TABLET, FILM COATED ORAL at 08:17

## 2022-08-24 RX ADMIN — AMLODIPINE BESYLATE 10 MG: 10 TABLET ORAL at 08:16

## 2022-08-24 RX ADMIN — INSULIN LISPRO 5 UNITS: 100 INJECTION, SOLUTION INTRAVENOUS; SUBCUTANEOUS at 08:18

## 2022-08-24 RX ADMIN — ASPIRIN 81 MG CHEWABLE TABLET 81 MG: 81 TABLET CHEWABLE at 08:17

## 2022-08-24 RX ADMIN — INSULIN LISPRO 3 UNITS: 100 INJECTION, SOLUTION INTRAVENOUS; SUBCUTANEOUS at 17:44

## 2022-08-24 RX ADMIN — AMOXICILLIN AND CLAVULANATE POTASSIUM 500 MG: 500; 125 TABLET, FILM COATED ORAL at 08:18

## 2022-08-24 RX ADMIN — BETHANECHOL CHLORIDE 10 MG: 10 TABLET ORAL at 21:29

## 2022-08-24 RX ADMIN — LEVOTHYROXINE SODIUM 50 MCG: 50 TABLET ORAL at 06:40

## 2022-08-24 RX ADMIN — ACETAMINOPHEN 650 MG: 325 TABLET, FILM COATED ORAL at 01:30

## 2022-08-24 RX ADMIN — BETHANECHOL CHLORIDE 10 MG: 10 TABLET ORAL at 08:18

## 2022-08-24 RX ADMIN — METOPROLOL SUCCINATE 50 MG: 50 TABLET, EXTENDED RELEASE ORAL at 08:17

## 2022-08-24 RX ADMIN — DICLOFENAC 2 G: 10 GEL TOPICAL at 21:45

## 2022-08-24 RX ADMIN — APIXABAN 5 MG: 5 TABLET, FILM COATED ORAL at 21:29

## 2022-08-24 RX ADMIN — Medication 10 ML: at 21:29

## 2022-08-24 NOTE — PROGRESS NOTES
[facility] Medicine Services  PROGRESS NOTE    Patient Name: Jermaine Singh  : 1945  MRN: [id number]    Date of Admission: 2022  Primary Care Physician: Farooq Painter MD    Subjective   Subjective     CC:  Follow-up renal failure, foot infection    HPI:  Labs drawn last night around 10 PM, demonstrating significantly improved creatinine.  Back is sore from laying in bed so much, eager to get out of the hospital for change of scenery.  No other complaints.  Wife at bedside updated    ROS:  Gen- No fevers, chills  CV- No chest pain, palpitations  Resp- No cough, dyspnea  GI- No N/V/D, abd pain    Objective   Objective     Vital Signs:   Temp:  [97.6 °F (36.4 °C)-98.4 °F (36.9 °C)] 98.4 °F (36.9 °C)  Heart Rate:  [59-64] 63  Resp:  [14-18] 14  BP: (103-116)/(49-76) 115/76  Flow (L/min):  [1-1.5] 1.5     Physical Exam:  Constitutional: Awake, alert, no acute distress, lying in bed  HENT: NCAT, mucous membranes moist  Respiratory: Clear to auscultation bilaterally, respiratory effort normal   Cardiovascular: RRR, no murmurs, rubs, or gallops, palpable radial pulses  Gastrointestinal: Positive bowel sounds, soft, nontender, nondistended  Musculoskeletal: LT foot with postoperative bandaging  Psychiatric: Appropriate affect, cooperative  Neurologic: Speech clear and fluent, moving extremities spontaneously    Results Reviewed:  LAB RESULTS:      Lab 22  0646 22  0639 22  0333   WBC 9.20 9.07 11.20*   HEMOGLOBIN 10.1* 9.8* 10.7*   HEMATOCRIT 30.2* 28.8* 31.9*   PLATELETS 163 157 160   MCV 87.3 86.2 87.2         Lab 227 22  0656 22  0646 22  0639 22  0333   SODIUM 140 138 137 136 133*   POTASSIUM 5.0 4.7 4.3 4.4 4.4   CHLORIDE 100 98 96* 96* 93*   CO2 30.0* 32.0* 27.0 30.0* 24.0   ANION GAP 10.0 8.0 14.0 10.0 16.0*   BUN 51* 73* 74* 86* 85*   CREATININE 1.84* 2.64* 2.71* 2.97* 3.23*   EGFR 37.3* 24.2* 23.4* 21.0* 19.0*   GLUCOSE  190* 194* 215* 139* 225*   CALCIUM 8.4* 8.4* 8.1* 8.2* 8.1*   PHOSPHORUS 3.7 4.5  --   --   --          Lab 08/23/22 2207 08/22/22  0656   ALBUMIN 3.10* 3.00*                     Brief Urine Lab Results  (Last result in the past 365 days)      Color   Clarity   Blood   Leuk Est   Nitrite   Protein   CREAT   Urine HCG        08/23/22 1309             59.2         08/23/22 1309 [place]   Cloudy   Large (3+)   Trace   Negative   30 mg/dL (1+)                 Microbiology Results Abnormal     Procedure Component Value - Date/Time    Eosinophil Smear - Urine, Urine, Clean Catch [[id number]]  (Normal) Collected: 08/14/22 1818    Lab Status: Final result Specimen: Urine, Clean Catch Updated: 08/14/22 2101     Eosinophil Smear 0 % EOS/100 Cells     Narrative:      No eosinophil seen    Anaerobic Culture - Tissue, Toe, Left [[id number]] Collected: 08/02/22 1027    Lab Status: Final result Specimen: Tissue from Toe, Left Updated: 08/07/22 0858     Anaerobic Culture No anaerobes isolated at 5 days    Blood Culture - Blood, Hand, Left [[id number]]  (Normal) Collected: 07/27/22 2040    Lab Status: Final result Specimen: Blood from Hand, Left Updated: 08/01/22 2132     Blood Culture No growth at 5 days    Blood Culture - Blood, Hand, Right [[id number]]  (Normal) Collected: 07/27/22 2030    Lab Status: Final result Specimen: Blood from Hand, Right Updated: 08/01/22 2132     Blood Culture No growth at 5 days    COVID PRE-OP / PRE-PROCEDURE SCREENING ORDER (NO ISOLATION) - Swab, Nasopharynx [[id number]]  (Normal) Collected: 07/27/22 2246    Lab Status: Final result Specimen: Swab from Nasopharynx Updated: 07/27/22 2357    Narrative:      The following orders were created for panel order COVID PRE-OP / PRE-PROCEDURE SCREENING ORDER (NO ISOLATION) - Swab, Nasopharynx.  Procedure                               Abnormality         Status                     ---------                               -----------         ------                      COVID-19 and FLU A/B PCR...[[id number]]  Normal              Final result                 Please view results for these tests on the individual orders.    COVID-19 and FLU A/B PCR - Swab, Nasopharynx [[id number]]  (Normal) Collected: 07/27/22 2246    Lab Status: Final result Specimen: Swab from Nasopharynx Updated: 07/27/22 2795     COVID19 Not Detected     Influenza A PCR Not Detected     Influenza B PCR Not Detected    Narrative:      Fact sheet for providers: https://www.fda.gov/media/715005/download    Fact sheet for patients: https://www.fda.gov/media/316293/download    Test performed by PCR.          No radiology results from the last 24 hrs    Results for orders placed during the hospital encounter of 07/27/22    Adult Transthoracic Echo Complete W/ Cont if Necessary Per Protocol    Interpretation Summary  · Left ventricular ejection fraction appears to be 46 - 50%. Left ventricular systolic function is low normal.  · Left ventricular septal hypokinesis  · No significant valvular heart disease      I have reviewed the medications:  Scheduled Meds:amLODIPine, 10 mg, Oral, Daily  amoxicillin-clavulanate, 1 tablet, Oral, Q12H  apixaban, 5 mg, Oral, Q12H  aspirin, 81 mg, Oral, Daily  bethanechol, 10 mg, Oral, TID  bumetanide, 2 mg, Intravenous, Daily  cetirizine, 5 mg, Oral, Daily  insulin detemir, 15 Units, Subcutaneous, Nightly  insulin lispro, 0-9 Units, Subcutaneous, TID AC  Insulin Lispro, 5 Units, Subcutaneous, TID With Meals  levothyroxine, 50 mcg, Oral, Q AM  metoprolol succinate XL, 50 mg, Oral, Q24H  polyethylene glycol, 17 g, Oral, Daily  senna-docusate sodium, 2 tablet, Oral, BID  sodium chloride, 10 mL, Intravenous, Q12H  tamsulosin, 0.4 mg, Oral, Daily  vitamin D3, 5,000 Units, Oral, Daily      Continuous Infusions:   PRN Meds:.•  acetaminophen **OR** acetaminophen **OR** acetaminophen  •  benzocaine-menthol  •  bisacodyl  •  calcium carbonate  •  dextrose  •  dextrose  •  glucagon (human  recombinant)  •  HYDROcodone-acetaminophen  •  ipratropium-albuterol  •  nitroglycerin  •  ondansetron **OR** ondansetron  •  phenol  •  prochlorperazine  •  sennosides-docusate  •  simethicone  •  sodium chloride    Assessment & Plan   Assessment & Plan     Active Hospital Problems    Diagnosis  POA   • **Acute renal failure (ARF) ([facility]) [N17.9]  Yes   • Acute systolic heart failure ([facility]) [I50.21]  No   • DVT, bilateral lower limbs ([facility]) [I82.403]  No   • Acute respiratory failure with hypoxia ([facility]) [J96.01]  No   • Proximal phalanx fracture of the second digit extending into the second metatarsal joint [S92.919A]  Yes   • Dislocation of left shoulder joint, initial encounter [S43.005A]  Yes   • Cellulitis in diabetic foot ([facility]) [E11.628, L03.119]  Yes   • Fall [W19.XXXA]  Yes   • SSS (sick sinus syndrome) ([facility]) [I49.5]  Yes   • Hyperlipidemia [E78.5]  Yes   • Hypertension [I10]  Yes   • S/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 Au [Z95.1]  Not Applicable   • Hypothyroidism (acquired) [E03.9]  Yes   • Type 2 diabetes mellitus ([facility]) [E11.9]  Yes      Resolved Hospital Problems    Diagnosis Date Resolved POA   • Sepsis ([facility]) [A41.9] 08/21/2022 Yes        Brief Hospital Course to date:  Jermaine Singh is a 77 y.o. male w/ DM2, CAD s/p prior CABG, PVD s/p RT LE angioplasty 7/2021 who initially presented to the ED 7/27/22 after sustaining a fall while walking his dog, identified to have LT shoulder dislocation reduced in the ED, at that time he had systemic markers for illness, admitted for concern of osteomyelitis and started on empiric IV antibiotics; he is now s/p LT first toe amputation 8/2 w/ Dr. Márquez; his hospitalization has been prolonged/complicated by acute pulmonary disease with marked hypoxia, and significant renal dysfunction; he has been followed by ID, nephrology, cardiology with slow but gradual recovery    Assessment/plan    Acute renal failure on likely CKD 2  -Base eGFR 60-70s;Cr 0.9-1.2  - Renal US  unrevealing, renal arterial duplex w/o evidence for stenosis  -Creatinine peaked 3.23  -Nephrology follows, continue IV diuresis per their recommendations  - Significant improvement on last labs    Acute hypoxic respiratory insufficiency, improved  Acute HFrEF  -Cardiology following, interpreted EF 40%  - VQ scan interpreted as low probability  - Oral Toprol-XL; other meds limited 2/2 renal disease; cards following  -Renal failure likely a contributing factor  -IV Bumex per nephrology  -Stable on room air today    LT first toe osteomyelitis/cellulitis, polymicrobial, POA  PAD s/p prior angioplasty to RT LE  -Follows w/ Dr. Joseph (podiatry) opt; on PO doxy PTA  -Arterial duplex strongly suggestive of PAD; no revasc per sgy/vasc team  - Tissue Cx w/ Enterococcus faecalis/casseliflavus  -S/p LT first toe amputation 8/2 w/ Dr. Márquez  -Lost PICC line 8/18; no replacement 2/2 renal disease  -S/p IV Unasyn, now on oral Augmentin due to loss of IV access, planned through 9/13/22 (x6-week postoperative)    Bilateral LE DVTs  -Continue Eliquis    Fall w/ anterior LT shoulder dislocation, reduced in ED  -Orthopedics (Dr. Gipson) has seen, recommend nonoperative management, sling for comfort  -Per Ortho: Shoulder ROM as tolerated, avoid abduction+ external rotation, continue PT/OT, follow-up outpatient  - d/w ortho 8/24 - may be out of sling with shoulder ROM as tolerated, follow up in the clinic 2-3 weeks post DC    Acute urinary retention  -cont bethanechol, Flomax  - FC placed 8/18 per renal  - Voiding trial after aggressive IV diuresis     DM type 2, A1c 9.0%, with long-term use of insulin  -Basal bolus insulin, Accu-Cheks w/ SSI    Hypertension  CAD s/p prior CABG  SSS s/p PPM  - Next device check 12/28/22  -cont Toprol-XL, amlodipine, aspirin  - ACEi on hold 2/2 renal disease    Hypothyroidism  -Levothyroxine    Constipation w/ N/V  -Improved?, Continue bowel regimen    Expected Discharge Location and Transportation:  [facility] rehab, ambulance  Expected Discharge Date: 8/26    DVT prophylaxis:  Medical and mechanical DVT prophylaxis orders are present.     AM-PAC 6 Clicks Score (PT): 11 (08/23/22 0702)    CODE STATUS:   Code Status and Medical Interventions:   Ordered at: 07/28/22 0000     Code Status (Patient has no pulse and is not breathing):    CPR (Attempt to Resuscitate)     Medical Interventions (Patient has pulse or is breathing):    Full Support       Navneet Venegas, DO  08/24/22

## 2022-08-24 NOTE — CASE MANAGEMENT/SOCIAL WORK
Continued Stay Note  [facility]     Patient Name: Jermaine Singh  MRN: [id number]  Today's Date: 8/24/2022    Admit Date: 7/27/2022     Discharge Plan     Row Name 08/24/22 1522       Plan    Plan [facility]    Plan Comments Case mgt f/u. I met with Mr Singh and spouse at bedside on 8/23 to review d/c options, [facility] vs SNF. [facility] has been following and informed me today they could accept him into an acute rehab bed when medically ready, please advise when ready. He will need to be off IV Bumex prior to transfer               Discharge Codes    No documentation.               Expected Discharge Date and Time     Expected Discharge Date Expected Discharge Time    Aug 26, 2022             Sonja C Kellerman, RN

## 2022-08-24 NOTE — PLAN OF CARE
Problem: Adult Inpatient Plan of Care  Goal: Plan of Care Review  Recent Flowsheet Documentation  Taken 8/24/2022 1411 by Rukhsana Renee, PT  Progress: improving  Plan of Care Reviewed With: patient  Outcome Evaluation: Patient stood from EOB x3 with mod assist x2. Patient continues to have difficulty maintaining NWB on L foot during transition from sitting to standing but now once he is in static standing, he is able to pick L foot up off of floor and maintain NWB on L in static standing for prolonged period of time. Increased independence with supine<->sit t/f today. Continue to recommend IRF at d/c. Will continue to progress as able.   Goal Outcome Evaluation:  Plan of Care Reviewed With: patient        Progress: improving  Outcome Evaluation: Patient stood from EOB x3 with mod assist x2. Patient continues to have difficulty maintaining NWB on L foot during transition from sitting to standing but now once he is in static standing, he is able to pick L foot up off of floor and maintain NWB on L in static standing for prolonged period of time. Increased independence with supine<->sit t/f today. Continue to recommend IRF at d/c. Will continue to progress as able.

## 2022-08-24 NOTE — PLAN OF CARE
Goal Outcome Evaluation:  Plan of Care Reviewed With: patient        Progress: improving  Outcome Evaluation: Pt trantioned sit-to-stand with min assist x2 using RW. He requires cues/assist maintain NWB LLE during sit-to-stand transition, however able to maintain in static standing. He completed sitting level grooming with supervision, LUE AROM and RUE HEP using red theraband. Recommend IPR.

## 2022-08-24 NOTE — PROGRESS NOTES
"CHIEF COMPLAINT: Follow-up left shoulder dislocation    SUBJECTIVE  Patient resting comfortably.  States shoulder pain is much improved..    PHYSICAL THERAPY PROGRESS  Outcome Evaluation: Patient stood from EOB x2 with mod assist x2. Patient still having difficulty maintaining NWB on L LE during t/f but today with repeated cues and education, was able to maintain NWB on L LE once static standing at EOB. Improved static sitting balance at EOB. Will continue to progress as able. Continue to recommend IRF at d/c. ([id number])     OBJECTIVE  Temp (24hrs), Av.7 °F (36.5 °C), Min:96.3 °F (35.7 °C), Max:98.4 °F (36.9 °C)    Blood pressure 106/88, pulse 57, temperature 96.3 °F (35.7 °C), temperature source Axillary, resp. rate 14, height 190.5 cm (75\"), weight 110 kg (242 lb), SpO2 93 %.    Lab Results (last 24 hours)     Procedure Component Value Units Date/Time    POC Glucose Once [[id number]]  (Abnormal) Collected: 22 1117    Specimen: Blood Updated: 22 1119     Glucose 151 mg/dL      Comment: Confirmed by Repeat Meter: [id number] : [id number] Gianna Schuster       POC Glucose Once [[id number]]  (Abnormal) Collected: 22 0739    Specimen: Blood Updated: 22 0741     Glucose 137 mg/dL      Comment: Confirmed by Repeat Meter: [id number] : [id number] Gianna Schuster       Renal Function Panel [[id number]]  (Abnormal) Collected: 22 2207    Specimen: Blood Updated: 22 2314     Glucose 190 mg/dL      BUN 51 mg/dL      Creatinine 1.84 mg/dL      Sodium 140 mmol/L      Potassium 5.0 mmol/L      Comment: Slight hemolysis detected by analyzer. Results may be affected.        Chloride 100 mmol/L      CO2 30.0 mmol/L      Calcium 8.4 mg/dL      Albumin 3.10 g/dL      Phosphorus 3.7 mg/dL      Anion Gap 10.0 mmol/L      BUN/Creatinine Ratio 27.7     eGFR 37.3 mL/min/1.73      Comment: National Kidney Foundation and American Society of Nephrology (ASN) Task Force recommended calculation based on " the Chronic Kidney Disease Epidemiology Collaboration (CKD-EPI) equation refit without adjustment for race.       Narrative:      GFR Normal >60  Chronic Kidney Disease <60  Kidney Failure <15      Urea Nitrogen, Urine - Urine, Clean Catch [[id number]] Collected: 08/23/22 1309    Specimen: Urine, Clean Catch Updated: 08/23/22 1857     Urea Nitrogen, Urine 602 mg/dL     Narrative:      Reference intervals for random urine have not been established.  Clinical usage is dependent upon physician's interpretation in combination with other laboratory tests.       Creatinine, Urine, Random - Urine, Clean Catch [[id number]] Collected: 08/23/22 1309    Specimen: Urine, Clean Catch Updated: 08/23/22 1857     Creatinine, Urine 59.2 mg/dL     Narrative:      Reference intervals for random urine have not been established.  Clinical usage is dependent upon physician's interpretation in combination with other laboratory tests.       POC Glucose Once [[id number]]  (Abnormal) Collected: 08/23/22 1619    Specimen: Blood Updated: 08/23/22 1621     Glucose 166 mg/dL      Comment: Meter: [id number] : [id number] Jane Htelma       Urinalysis With Microscopic If Indicated (No Culture) - Urine, Clean Catch [[id number]]  (Abnormal) Collected: 08/23/22 1309    Specimen: Urine, Clean Catch Updated: 08/23/22 1355     Color, UA Story     Appearance, UA Cloudy     pH, UA 5.5     Specific Gravity, UA 1.014     Glucose, UA Negative     Ketones, UA Negative     Bilirubin, UA Negative     Blood, UA Large (3+)     Protein, UA 30 mg/dL (1+)     Leuk Esterase, UA Trace     Nitrite, UA Negative     Urobilinogen, UA 0.2 E.U./dL    Urinalysis, Microscopic Only - Urine, Clean Catch [[id number]]  (Abnormal) Collected: 08/23/22 1309    Specimen: Urine, Clean Catch Updated: 08/23/22 1355     RBC, UA Too Numerous to Count /HPF      WBC, UA 6-12 /HPF      Bacteria, UA None Seen /HPF      Squamous Epithelial Cells, UA 3-6 /HPF      Hyaline Casts, UA 0-6 /LPF       Methodology Manual Light Microscopy    Sodium, Urine, Random - Urine, Clean Catch [[id number]] Collected: 08/23/22 1309    Specimen: Urine, Clean Catch Updated: 08/23/22 1355     Sodium, Urine 45 mmol/L     Narrative:      Reference intervals for random urine have not been established.  Clinical usage is dependent upon physician's interpretation in combination with other laboratory tests.               PHYSICAL EXAM  Left upper extremity: Shoulder range of motion is forward flexion to 110 degrees, abduction to 140 degrees, internal rotation to abdomen, external rotation to 20 degrees.  Nontender to palpation about the shoulder.  4/5 rotator cuff strength.  Intact EPL, FPL, EDC, FDP, FDS, interosseous, wrist flexion, wrist extension, biceps, triceps, deltoid. Sensation intact to light touch to median, radial, ulnar, and axillary nerves. 2+ palpable radial pulse.         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 Au    SSS (sick sinus syndrome) ([facility])    Dislocation of left shoulder joint, initial encounter    Cellulitis in diabetic foot ([facility])    Fall    Proximal phalanx fracture of the second digit extending into the second metatarsal joint    Acute systolic heart failure ([facility])    DVT, bilateral lower limbs (HCC)    Acute respiratory failure with hypoxia (HCC)      PLAN / DISPOSITION:  Follow-up left shoulder dislocation    Discontinue sling  Continue PT for range of motion and shoulder strengthening exercises.  Follow-up with Dr. Gipson in 2 to 3 weeks.    No future appointments.    Eyad Gipson MD  08/24/22  12:16 EDT

## 2022-08-24 NOTE — PROGRESS NOTES
Cardiothoracic Surgery Progress Note      POD # 23 -left great toe transmetatarsal amputation primary closure     LOS: 28 days      Subjective:   No acute events overnight.  States his toe continues to feel better.       Objective:  Vital Signs vital signs below noted T-max past 24 hours 97.8 °F  Temp:  [97.6 °F (36.4 °C)-98.4 °F (36.9 °C)] 98.4 °F (36.9 °C)  Heart Rate:  [59-64] 63  Resp:  [14-18] 14  BP: (103-116)/(49-76) 115/76    Physical Exam:   General Appearance: Awake and alert.   Lungs:   Heart:   Skin: Ulceration middle phalangeal joint left second toe with exposed phalangeal bone/joint space   Incision: Incision is clean, dry and intact with skin margins viable and sutures intact.      Results:  Results from last 7 days   Lab Units 08/21/22  0646   WBC 10*3/mm3 9.20   HEMOGLOBIN g/dL 10.1*   HEMATOCRIT % 30.2*   PLATELETS 10*3/mm3 163     Results from last 7 days   Lab Units 08/23/22  2207   SODIUM mmol/L 140   POTASSIUM mmol/L 5.0   CHLORIDE mmol/L 100   CO2 mmol/L 30.0*   BUN mg/dL 51*   CREATININE mg/dL 1.84*   GLUCOSE mg/dL 190*   CALCIUM mg/dL 8.4*         Assessment: #1.  Postop day 23 left great toe transmetatarsal amputation primary closure overall healing well with some ischemia at the proximal third  To expose middle phalangeal joint space with exposed bone secondary to hammertoe phenomena and chronic diabetic ulceration  3 status post remote coronary bypass grafting  4 status post remote pacemaker placement for sick sinus syndrome  5.  Recent fall with rib shoulder dislocation reduced in the emergency department on this admission  6.  Endovascular angioplasty stenting right lower extremity per Dr. Hernandez 2019        Plan: Daily dressing changes on amputation site as well as over the ulceration the middle phalangeal joint space of the left second toe.  Antibiotics per infectious disease.  Medical manage per hospitalist/cardiologist.  Renal medicine following closely for increasing creatinine  and BUN, seems to be improving today.  Cleared for discharge from a surgical perspective per primary service.      Traci Baugh PA-C - 08/24/22 - 09:00 EDT

## 2022-08-24 NOTE — PROGRESS NOTES
"   LOS: 28 days    Patient Care Team:  Farooq Painter MD as PCP - General (Family Medicine)  Jermaine Hernandez MD as Consulting Physician (Cardiology)      Subjective     Interval History:     No acute events overnight. No new complaints       Review of Systems:   No CP or SOA , fever, chills, rigors, rash, N/V, Constipation.       Objective     Vital Sign Min/Max for last 24 hours  Temp  Min: 97.6 °F (36.4 °C)  Max: 98.4 °F (36.9 °C)   BP  Min: 103/73  Max: 116/70   Pulse  Min: 59  Max: 64   Resp  Min: 14  Max: 18   SpO2  Min: 91 %  Max: 95 %   Flow (L/min)  Min: 1  Max: 1.5   No data recorded     Flowsheet Rows    Flowsheet Row First Filed Value   Admission Height 190.5 cm (75\") Documented at 07/27/2022 1848   Admission Weight 98 kg (216 lb) Documented at 07/27/2022 1848          I/O this shift:  In: 250 [P.O.:250]  Out: -   I/O last 3 completed shifts:  In: 780 [P.O.:780]  Out: 2475 [Urine:2475]    Physical Exam:    Gen: Alert, NAD   HENT: NC, AT, EOMI   NECK: Supple, no JVD, Trachea midline   LUNGS: CTA bilaterally, non labored respirtation   CVS: S1/S2 audible, RRR, no murmur   Abd: Soft, NT, ND, BS+   Ext: ++ pedal edema, no cyanosis   CNS: Alert, No focal deficit noted grossly  Psy: Cooperative  Skin: Warm, dry and intact      WBC No results found for: WBC   HGB No results found for: HGB   HCT No results found for: HCT   Platlets No results found for: LABPLAT   MCV No results found for: MCV       Sodium Sodium   Date Value Ref Range Status   08/23/2022 140 136 - 145 mmol/L Final   08/22/2022 138 136 - 145 mmol/L Final      Potassium Potassium   Date Value Ref Range Status   08/23/2022 5.0 3.5 - 5.2 mmol/L Final     Comment:     Slight hemolysis detected by analyzer. Results may be affected.   08/22/2022 4.7 3.5 - 5.2 mmol/L Final      Chloride Chloride   Date Value Ref Range Status   08/23/2022 100 98 - 107 mmol/L Final   08/22/2022 98 98 - 107 mmol/L Final      CO2 CO2   Date Value Ref Range Status "   08/23/2022 30.0 (H) 22.0 - 29.0 mmol/L Final   08/22/2022 32.0 (H) 22.0 - 29.0 mmol/L Final      BUN BUN   Date Value Ref Range Status   08/23/2022 51 (H) 8 - 23 mg/dL Final   08/22/2022 73 (H) 8 - 23 mg/dL Final      Creatinine Creatinine   Date Value Ref Range Status   08/23/2022 1.84 (H) 0.76 - 1.27 mg/dL Final   08/22/2022 2.64 (H) 0.76 - 1.27 mg/dL Final      Calcium Calcium   Date Value Ref Range Status   08/23/2022 8.4 (L) 8.6 - 10.5 mg/dL Final   08/22/2022 8.4 (L) 8.6 - 10.5 mg/dL Final      PO4 No results found for: CAPO4   Albumin Albumin   Date Value Ref Range Status   08/23/2022 3.10 (L) 3.50 - 5.20 g/dL Final   08/22/2022 3.00 (L) 3.50 - 5.20 g/dL Final      Magnesium No results found for: MG   Uric Acid No results found for: URICACID        Results Review:     I reviewed the patient's new clinical results.    amLODIPine, 10 mg, Oral, Daily  amoxicillin-clavulanate, 1 tablet, Oral, Q12H  apixaban, 5 mg, Oral, Q12H  aspirin, 81 mg, Oral, Daily  bethanechol, 10 mg, Oral, TID  bumetanide, 2 mg, Intravenous, Daily  cetirizine, 5 mg, Oral, Daily  insulin detemir, 15 Units, Subcutaneous, Nightly  insulin lispro, 0-9 Units, Subcutaneous, TID AC  Insulin Lispro, 5 Units, Subcutaneous, TID With Meals  levothyroxine, 50 mcg, Oral, Q AM  metoprolol succinate XL, 50 mg, Oral, Q24H  polyethylene glycol, 17 g, Oral, Daily  senna-docusate sodium, 2 tablet, Oral, BID  sodium chloride, 10 mL, Intravenous, Q12H  tamsulosin, 0.4 mg, Oral, Daily  vitamin D3, 5,000 Units, Oral, Daily           Medication Review:     Assessment & Plan       Acute renal failure (ARF) (HCC)    Type 2 diabetes mellitus (HCC)    Hypertension    Hyperlipidemia    Hypothyroidism (acquired)    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 Au    SSS (sick sinus syndrome) ([facility])    Dislocation of left shoulder joint, initial encounter    Cellulitis in diabetic foot ([facility])    Fall    Proximal phalanx fracture of the second digit extending into the second  metatarsal joint    Acute systolic heart failure (HCC)    DVT, bilateral lower limbs (HCC)    Acute respiratory failure with hypoxia (HCC)    1- [place] - non oliguric -  Pre- renal azotemia/ATN (Hypotension vs vancomycin related) vs AIN - Renal duplex - no WILMA. Improving. UA Microscopic hematuria RBC TNTC. TRISH(-), Complement level normal.   2- HTN   3- Anemia   4- Osteomyelitis - left toe   5- DM   6- CAD s/p CABG 3/22/19 - EF 45- 50%  7- Sick sinus syndrome   8- Proteinuria - UPCR   9- Pulmonary edema      Plan:  - Continue with current.   - Avoid nephrotoxic agents.   - Monitor I/O   - Renal diet.   - Adjust meds per renal function   - No emergent need of RRT.     Semaj Ly MD  08/24/22  10:03 EDT

## 2022-08-24 NOTE — THERAPY TREATMENT NOTE
Patient Name: Jermaine Singh  : 1945    MRN: [id number]                              Today's Date: 2022       Admit Date: 2022    Visit Dx:     ICD-10-CM ICD-9-CM   1. Dislocation of left shoulder joint, initial encounter  S43.005A 831.00   2. Cellulitis of left foot  L03.116 682.7   3. 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  Preserved LV function   • Type 2 diabetes mellitus (HCC)   • Hypertension   • Hyperlipidemia   • Hypothyroidism (acquired)   • Vitamin D deficiency on Rx    • Serum Cr 1.32 on admission.  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 Au   • Dizziness   • Atherosclerosis of native artery of both lower extremities with intermittent claudication ([facility])   • SSS (sick sinus syndrome) ([facility])   • Dislocation of left shoulder joint, initial encounter   • Cellulitis in diabetic foot ([facility])   • Fall   • Lactic acidosis   • Proximal phalanx fracture of the second digit extending into the second metatarsal joint   • Acute renal failure (ARF) ([facility])   • Acute systolic heart failure ([facility])   • DVT, bilateral lower limbs ([facility])   • Acute respiratory failure with hypoxia ([facility])     Past Medical History:   Diagnosis Date   • Asthma    • Coronary artery disease    • Diabetes mellitus (HCC) 2000    started on inuslin 2018; started on po meds in ; checking blood sugars daily    • Disease of thyroid gland     po meds daily for hypothyroidism    • History of fracture as a child     rt leg- severe    • Hyperlipidemia    • Hypertension    • Hypothyroidism    • Peripheral neuropathy    • Peripheral vascular disease (HCC)     s/p angiogram -needs stent in left leg    • RBBB    • Right knee pain    • Vitamin D deficiency      Past Surgical History:   Procedure Laterality Date   • APPENDECTOMY     • CARDIAC CATHETERIZATION N/A 2019     Procedure: Left Heart Cath;  Surgeon: Cooper Apodaca MD;  Location:  [facility];  Service: Cardiology   • CARDIAC ELECTROPHYSIOLOGY PROCEDURE N/A 5/18/2022    Procedure: DEVICE IMPLANT;  Surgeon: Cooper Apodaca MD;  Location:  [facility];  Service: Cardiology;  Laterality: N/A;   • COLONOSCOPY     • CORONARY ARTERY BYPASS GRAFT N/A 3/22/2019    Procedure: MEDIAN STERNOTOMY, CORONARY ARTERY BYPASS GRAFT X3, UTILIZING THE LEFT INTERNAL MAMMARY ARTERY, EVH AND OPEN HARVEST OF THE RIGHT GREATER SAPHENOUS VEIN, EXPLORATION OF THE LEFT LEG;  Surgeon: Ap Au MD;  Location:  [facility];  Service: Cardiothoracic   • EYE SURGERY Bilateral     cataracts    • INTERVENTIONAL RADIOLOGY PROCEDURE N/A 7/29/2021    Procedure: Abdominal Aortagram with Runoff;  Surgeon: Jermaine Hernandez MD;  Location:  [facility];  Service: Cardiovascular;  Laterality: N/A;   • KNEE ARTHROSCOPY      right x 2, left x 1   • LACERATION REPAIR      right leg   • LEG SURGERY      2 for fracture of rt leg    • TONSILLECTOMY      Adnoidectomy   • TRANS METATARSAL AMPUTATION Left 8/2/2022    Procedure: GREAT TOE AMPUTATION LEFT;  Surgeon: Gopal Márquez MD;  Location:  [facility];  Service: Vascular;  Laterality: Left;      General Information     Row Name 08/24/22 1713          OT Time and Intention    Document Type therapy note (daily note)  -AR     Mode of Treatment individual therapy;occupational therapy  -AR     Row Name 08/24/22 1713          General Information    Existing Precautions/Restrictions fall;left;non-weight bearing;other (see comments)  WBAT LUE, NWB LLE with post-op shoe  -AR     Row Name 08/24/22 1713          Cognition    Orientation Status (Cognition) oriented x 4  -AR     Row Name 08/24/22 1713          Safety Issues, Functional Mobility    Safety Issues Affecting Function (Mobility) impulsivity;insight into  deficits/self-awareness;judgment;problem-solving;positioning of assistive device;safety precaution awareness;safety precautions follow-through/compliance;sequencing abilities;unable to maintain weight-bearing restrictions  -AR     Impairments Affecting Function (Mobility) balance;strength;cognition;endurance/activity tolerance;postural/trunk control;pain;range of motion (ROM);motor control;motor planning;sensation/sensory awareness  -AR           User Key  (r) = Recorded By, (t) = Taken By, (c) = Cosigned By    Initials Name Provider Type    AR Nathalia Osborne OT Occupational Therapist                 Mobility/ADL's     Row Name 08/24/22 1714          Transfers    Transfers sit-stand transfer;stand-sit transfer  -AR     Comment, (Transfers) Difficulty maintaining NWB LLE during sit-to-stand transition x2 trials. Once in static standing, pt able to maintain NWB LLE with cues.  -AR     Sit-Stand Castro (Transfers) minimum assist (75% patient effort);2 person assist;verbal cues  -AR     Stand-Sit Castro (Transfers) minimum assist (75% patient effort);2 person assist;verbal cues  -AR     Row Name 08/24/22 1714          Sit-Stand Transfer    Assistive Device (Sit-Stand Transfers) walker, front-wheeled  -AR     Row Name 08/24/22 1714          Stand-Sit Transfer    Assistive Device (Stand-Sit Transfers) walker, front-wheeled  -AR     Row Name 08/24/22 1714          Mobility    Extremity Weight-bearing Status left upper extremity;left lower extremity  -AR     Left Upper Extremity (Weight-bearing Status) weight-bearing as tolerated (WBAT)  -AR     Left Lower Extremity (Weight-bearing Status) non weight-bearing (NWB)   -AR     Row Name 08/24/22 1714          Self-Feeding Assessment/Training    Castro Level (Feeding) liquids to mouth;supervision  -AR     Position (Self-Feeding) supported sitting  -AR     Row Name 08/24/22 1714          Grooming Assessment/Training    Castro Level (Grooming) wash  face, hands;supervision;set up  -AR     Position (Grooming) supported sitting  -AR           User Key  (r) = Recorded By, (t) = Taken By, (c) = Cosigned By    Initials Name Provider Type    Nathalia Nava OT Occupational Therapist               Obj/Interventions     [facility] Name 08/24/22 1716          Shoulder (Therapeutic Exercise)    Shoulder (Therapeutic Exercise) strengthening exercise  -AR     Shoulder AROM (Therapeutic Exercise) left;flexion;aBduction;sitting;10 repetitions  -AR     Shoulder Strengthening (Therapeutic Exercise) right;flexion;extension;external rotation;internal rotation;red;resistance band;10 repetitions  -AR     Row Name 08/24/22 1716          Elbow/Forearm (Therapeutic Exercise)    Elbow/Forearm (Therapeutic Exercise) AROM (active range of motion);strengthening exercise  -AR     Elbow/Forearm AROM (Therapeutic Exercise) left;extension;flexion;sitting;15 repititions  -AR     Elbow/Forearm Strengthening (Therapeutic Exercise) right;extension;flexion;sitting;resistance band;red;15 repititions  -AR     Row Name 08/24/22 1716          Balance    Static Sitting Balance supervision  -AR     Dynamic Sitting Balance contact guard  -AR     Position, Sitting Balance unsupported;sitting in chair  -AR     Static Standing Balance minimal assist;2-person assist;verbal cues  -AR     Dynamic Standing Balance minimal assist;2-person assist;verbal cues  -AR     Position/Device Used, Standing Balance walker, front-wheeled  -AR           User Key  (r) = Recorded By, (t) = Taken By, (c) = Cosigned By    Initials Name Provider Type    Nathalia Nava OT Occupational Therapist               Goals/Plan     Row Name 08/24/22 1721          Transfer Goal 1 (OT)    Progress/Outcome (Transfer Goal 1, OT) goal partially met;goal ongoing  -AR     Row Name 08/24/22 1721          Dressing Goal 1 (OT)    Progress/Outcome (Dressing Goal 1, OT) goal ongoing  -AR     Row Name 08/24/22 1721          Grooming Goal 1  (OT)    Progress/Outcome (Grooming Goal 1, OT) goal ongoing  -AR     Row Name 08/24/22 1721          ROM Goal 1 (OT)    Progress/Outcome (ROM Goal 1, OT) goal ongoing  -AR           User Key  (r) = Recorded By, (t) = Taken By, (c) = Cosigned By    Initials Name Provider Type    Nathalia Nava, OT Occupational Therapist               Clinical Impression     Row Name 08/24/22 1718          Pain Assessment    Pre/Posttreatment Pain Comment c/o LLE spasms  -AR     Pain Intervention(s) Nursing Notified;Repositioned;Ambulation/increased activity  -AR     Row Name 08/24/22 1718          Pain Scale: FACES Pre/Post-Treatment    Pain: FACES Scale, Pretreatment 0-->no hurt  -AR     Posttreatment Pain Rating 6-->hurts even more  -AR     Pain Location - Side/Orientation Left  -AR     Pain Location lower  -AR     Pain Location - extremity  -AR     Row Name 08/24/22 1718          Plan of Care Review    Plan of Care Reviewed With patient  -AR     Progress improving  -AR     Outcome Evaluation Pt trantioned sit-to-stand with min assist x2 using RW. He requires cues/assist maintain NWB LLE during sit-to-stand transition, however able to maintain in static standing. He completed sitting level grooming with supervision, LUE AROM and RUE HEP using red theraband. Recommend IPR.  -AR     Row Name 08/24/22 1718          Therapy Plan Review/Discharge Plan (OT)    Anticipated Discharge Disposition (OT) inpatient rehabilitation facility  -AR     Row Name 08/24/22 1718          Vital Signs    Pre Patient Position Sitting  -AR     Intra Patient Position Standing  -AR     Post Patient Position Sitting  -AR     Row Name 08/24/22 1718          Positioning and Restraints    Pre-Treatment Position sitting in chair/recliner  -AR     Post Treatment Position chair  -AR     In Chair notified nsg;reclined;call light within reach;encouraged to call for assist;exit alarm on;with family/caregiver;legs elevated  -AR           User Key  (r) = Recorded  By, (t) = Taken By, (c) = Cosigned By    Initials Name Provider Type    Nathalia Nava, OT Occupational Therapist               Outcome Measures     Row Name 08/24/22 1722          How much help from another is currently needed...    Putting on and taking off regular lower body clothing? 2  -AR     Bathing (including washing, rinsing, and drying) 2  -AR     Toileting (which includes using toilet bed pan or urinal) 2  -AR     Putting on and taking off regular upper body clothing 3  -AR     Taking care of personal grooming (such as brushing teeth) 3  -AR     Eating meals 3  -AR     AM-PAC 6 Clicks Score (OT) 15  -AR     Row Name 08/24/22 1411          How much help from another person do you currently need...    Turning from your back to your side while in flat bed without using bedrails? 3  -LR     Moving from lying on back to sitting on the side of a flat bed without bedrails? 3  -LR     Moving to and from a bed to a chair (including a wheelchair)? 1  -LR     Standing up from a chair using your arms (e.g., wheelchair, bedside chair)? 2  -LR     Climbing 3-5 steps with a railing? 1  -LR     To walk in hospital room? 1  -LR     AM-PAC 6 Clicks Score (PT) 11  -LR     Highest level of mobility 4 --> Transferred to chair/commode  -LR     Row Name 08/24/22 1722 08/24/22 1411       Functional Assessment    Outcome Measure Options AM-PAC 6 Clicks Daily Activity (OT)  -AR AM-PAC 6 Clicks Basic Mobility (PT)  -LR          User Key  (r) = Recorded By, (t) = Taken By, (c) = Cosigned By    Initials Name Provider Type    Rukhsana Monge, PT Physical Therapist    Nathalia Nava, OT Occupational Therapist                Occupational Therapy Education                 Title: PT OT SLP Therapies (In Progress)     Topic: Occupational Therapy (Done)     Point: ADL training (Done)     Description:   Instruct learner(s) on proper safety adaptation and remediation techniques during self care or transfers.    Instruct in proper use of assistive devices.              Learning Progress Summary           Patient Eager, E,TB,D,H, VU,NR by AR at 8/24/2022 1723   Family Eager, E,TB,D,H, VU,NR by AR at 8/24/2022 1723      Show all documentation for this point (19)                 Point: Home exercise program (Done)     Description:   Instruct learner(s) on appropriate technique for monitoring, assisting and/or progressing therapeutic exercises/activities.              Learning Progress Summary           Patient Eager, E,TB,D,H, VU,NR by AR at 8/24/2022 1723   Family Eager, E,TB,D,H, VU,NR by AR at 8/24/2022 1723      Show all documentation for this point (20)                 Point: Precautions (Done)     Description:   Instruct learner(s) on prescribed precautions during self-care and functional transfers.              Learning Progress Summary           Patient Eager, E,TB,D,H, VU,NR by AR at 8/24/2022 1723   Family Eager, E,TB,D,H, VU,NR by AR at 8/24/2022 1723      Show all documentation for this point (20)                 Point: Body mechanics (Done)     Description:   Instruct learner(s) on proper positioning and spine alignment during self-care, functional mobility activities and/or exercises.              Learning Progress Summary           Patient Eager, E,TB,D,H, VU,NR by AR at 8/24/2022 1723   Family Eager, E,TB,D,H, VU,NR by AR at 8/24/2022 1723      Show all documentation for this point (16)                             User Key     Initials Effective Dates Name Provider Type Discipline    AR 06/16/21 -  Nathalia Osborne OT Occupational Therapist OT              OT Recommendation and Plan     Plan of Care Review  Plan of Care Reviewed With: patient  Progress: improving  Outcome Evaluation: Pt trantioned sit-to-stand with min assist x2 using RW. He requires cues/assist maintain NWB LLE during sit-to-stand transition, however able to maintain in static standing. He completed sitting level grooming with supervision,  LUE AROM and RUE HEP using red theraband. Recommend IPR.     Time Calculation:    Time Calculation- OT     Row Name 08/24/22 1723             Time Calculation- OT    OT Start Time 1601  -AR      OT Received On 08/24/22  -AR      OT Goal Re-Cert Due Date 08/25/22  -AR              Timed Charges    37241 - OT Therapeutic Exercise Minutes 12  -AR      46455 - OT Self Care/Mgmt Minutes 13  -AR              Total Minutes    Timed Charges Total Minutes 25  -AR       Total Minutes 25  -AR            User Key  (r) = Recorded By, (t) = Taken By, (c) = Cosigned By    Initials Name Provider Type    AR Nathalia Osborne OT Occupational Therapist              Therapy Charges for Today     Code Description Service Date Service Provider Modifiers Qty    53091806301 HC OT SELF CARE/MGMT/TRAIN EA 15 MIN 8/24/2022 Nathalia Osborne, OT GO 1    25793355864 HC OT THER PROC EA 15 MIN 8/24/2022 Nathalia Osborne OT GO 1    42811767934 HC OT THER SUPP EA 15 MIN 8/24/2022 Nathalia Osborne OT GO 2               Nathalia Osborne OT  8/24/2022

## 2022-08-24 NOTE — THERAPY TREATMENT NOTE
Patient Name: Jermaine Singh  : 1945    MRN: [id number]                              Today's Date: 2022       Admit Date: 2022    Visit Dx:     ICD-10-CM ICD-9-CM   1. Dislocation of left shoulder joint, initial encounter  S43.005A 831.00   2. Cellulitis of left foot  L03.116 682.7   3. 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  Preserved LV function   • Type 2 diabetes mellitus (HCC)   • Hypertension   • Hyperlipidemia   • Hypothyroidism (acquired)   • Vitamin D deficiency on Rx    • Serum Cr 1.32 on admission.  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 Au   • Dizziness   • Atherosclerosis of native artery of both lower extremities with intermittent claudication ([facility])   • SSS (sick sinus syndrome) ([facility])   • Dislocation of left shoulder joint, initial encounter   • Cellulitis in diabetic foot ([facility])   • Fall   • Lactic acidosis   • Proximal phalanx fracture of the second digit extending into the second metatarsal joint   • Acute renal failure (ARF) ([facility])   • Acute systolic heart failure ([facility])   • DVT, bilateral lower limbs ([facility])   • Acute respiratory failure with hypoxia ([facility])     Past Medical History:   Diagnosis Date   • Asthma    • Coronary artery disease    • Diabetes mellitus (HCC) 2000    started on [place] 2018; started on po meds in ; checking blood sugars daily    • Disease of thyroid gland     po meds daily for hypothyroidism    • History of fracture as a child     rt leg- severe    • Hyperlipidemia    • Hypertension    • Hypothyroidism    • Peripheral neuropathy    • Peripheral vascular disease (HCC)     s/p angiogram -needs stent in left leg    • RBBB    • Right knee pain    • Vitamin D deficiency      Past Surgical History:   Procedure Laterality Date   • APPENDECTOMY     • CARDIAC CATHETERIZATION N/A 2019     Procedure: Left Heart Cath;  Surgeon: Cooper Apodaca MD;  Location:  [facility];  Service: Cardiology   • CARDIAC ELECTROPHYSIOLOGY PROCEDURE N/A 5/18/2022    Procedure: DEVICE IMPLANT;  Surgeon: Cooper Apodaca MD;  Location:  [facility];  Service: Cardiology;  Laterality: N/A;   • COLONOSCOPY     • CORONARY ARTERY BYPASS GRAFT N/A 3/22/2019    Procedure: MEDIAN STERNOTOMY, CORONARY ARTERY BYPASS GRAFT X3, UTILIZING THE LEFT INTERNAL MAMMARY ARTERY, EVH AND OPEN HARVEST OF THE RIGHT GREATER SAPHENOUS VEIN, EXPLORATION OF THE LEFT LEG;  Surgeon: Ap Au MD;  Location:  [facility];  Service: Cardiothoracic   • EYE SURGERY Bilateral     cataracts    • INTERVENTIONAL RADIOLOGY PROCEDURE N/A 7/29/2021    Procedure: Abdominal Aortagram with Runoff;  Surgeon: Jermaine Hernandez MD;  Location:  [facility];  Service: Cardiovascular;  Laterality: N/A;   • KNEE ARTHROSCOPY      right x 2, left x 1   • LACERATION REPAIR      right leg   • LEG SURGERY      2 for fracture of rt leg    • TONSILLECTOMY      Adnoidectomy   • TRANS METATARSAL AMPUTATION Left 8/2/2022    Procedure: GREAT TOE AMPUTATION LEFT;  Surgeon: Gopal Márquez MD;  Location:  [facility];  Service: Vascular;  Laterality: Left;      General Information     Row Name 08/24/22 1411          Physical Therapy Time and Intention    Document Type therapy note (daily note)  -LR     Mode of Treatment physical therapy;individual therapy  -LR     Row Name 08/24/22 1411          General Information    Patient Profile Reviewed yes  -LR     Existing Precautions/Restrictions fall;left;non-weight bearing;other (see comments)  WBAT LUE- no ABD or ER; NWB LLE with post-op shoe  -LR     Barriers to Rehab medically complex;previous functional deficit;cognitive status  -LR     Row Name 08/24/22 1411          Cognition    Orientation Status (Cognition) oriented x 3  -LR     Row Name 08/24/22 1411           Safety Issues, Functional Mobility    Safety Issues Affecting Function (Mobility) safety precautions follow-through/compliance;safety precaution awareness;unable to maintain weight-bearing restrictions;sequencing abilities;awareness of need for assistance;insight into deficits/self-awareness;at risk behavior observed;impulsivity;positioning of assistive device;judgment  -LR     Impairments Affecting Function (Mobility) balance;strength;cognition;endurance/activity tolerance;postural/trunk control;pain;range of motion (ROM);motor control;motor planning  -LR           User Key  (r) = Recorded By, (t) = Taken By, (c) = Cosigned By    Initials Name Provider Type    LR Rukhsana Renee, PT Physical Therapist               Mobility     Row Name 08/24/22 1411          Bed Mobility    Supine-Sit [place] (Bed Mobility) verbal cues;standby assist  -LR     Sit-Supine [place] (Bed Mobility) verbal cues;standby assist  -LR     Assistive Device (Bed Mobility) head of bed elevated;bed rails  -LR     Comment, (Bed Mobility) Verbal cues for correct sequencing for t/f to and from EOB. No assist required. C/o dizziness upon sitting up, improved with rest. T/f from bed to chair via overhead mechanical lift d/t patient's inability to maintain NWB on R LE during t/f from bed to chair.  -LR     Row Name 08/24/22 1411          Transfers    Comment, (Transfers) Verbal cues to push up from bed to stand and to reach back for bed to lower into sitting. Verbal cues to control descent when returning to sitting EOB, improved compared to yesterday. Patient stood from EOB x3. Impulsive initially with t/f training. Continues to bear some weight on L foot with transition from sitting to standing but not consistently able to offload L foot in static standing and maintain NWB in standing.  -LR     Row Name 08/24/22 1411          Bed-Chair Transfer    Bed-Chair [place] (Transfers) verbal cues;dependent (less than 25% patient  effort);2 person assist  -LR     Assistive Device (Bed-Chair Transfers) lift device  -LR     Row Name 08/24/22 1411          Sit-Stand Transfer    Sit-Stand [place] (Transfers) verbal cues;moderate assist (50% patient effort);2 person assist  -LR     Assistive Device (Sit-Stand Transfers) walker, front-wheeled  -LR     Row Name 08/24/22 1411          Gait/Stairs (Locomotion)    [place] Level (Gait) unable to assess  -LR     [place] Level (Stairs) not tested  -LR     Comment, (Gait/Stairs) Patient is NWB on L foot and therefore unable to hop on R foot for gait training.  -LR     Row Name 08/24/22 1411          Mobility    Extremity Weight-bearing Status left upper extremity;left lower extremity  -LR     Left Upper Extremity (Weight-bearing Status) weight-bearing as tolerated (WBAT)  -LR     Left Lower Extremity (Weight-bearing Status) non weight-bearing (NWB)  -LR           User Key  (r) = Recorded By, (t) = Taken By, (c) = Cosigned By    Initials Name Provider Type    LR Rukhsana Renee, PT Physical Therapist               Obj/Interventions     Row Name 08/24/22 1411          Motor Skills    Therapeutic Exercise ankle;knee;hip;other (see comments)  cues for technique; no assist required  -LR     Row Name 08/24/22 1411          Hip (Therapeutic Exercise)    Hip (Therapeutic Exercise) strengthening exercise;isometric exercises  -LR     Hip Isometrics (Therapeutic Exercise) bilateral;aDduction;gluteal sets;10 repetitions;sitting  seated hip adduction pillow squeezes  -LR     Hip Strengthening (Therapeutic Exercise) bilateral;heel slides;aBduction;marching while seated;sitting;10 repetitions  -LR     Row Name 08/24/22 1411          Knee (Therapeutic Exercise)    Knee (Therapeutic Exercise) strengthening exercise;isometric exercises  -LR     Knee Isometrics (Therapeutic Exercise) bilateral;quad sets;sitting;10 repetitions  -LR     Knee Strengthening (Therapeutic Exercise) bilateral;SLR (straight leg  raise);LAQ (long arc quad);sitting;10 repetitions  -LR     Row Name 08/24/22 1411          Ankle (Therapeutic Exercise)    Ankle (Therapeutic Exercise) AROM (active range of motion)  -LR     Ankle AROM (Therapeutic Exercise) bilateral;dorsiflexion;plantarflexion;sitting;10 repetitions  -LR     Row Name 08/24/22 1411          Balance    Balance Assessment sitting static balance;sitting dynamic balance;standing static balance;standing dynamic balance  -LR     Static Sitting Balance contact guard  -LR     Dynamic Sitting Balance contact guard  -LR     Position, Sitting Balance unsupported;sitting edge of bed  -LR     Static Standing Balance minimal assist;2-person assist  -LR     Dynamic Standing Balance minimal assist;2-person assist  -LR     Position/Device Used, Standing Balance supported;walker, rolling  -LR     Comment, Balance stood from EOB x3 , ~30 seconds each stand, sat EOB [id number], improved static sitting balance today  -LR           User Key  (r) = Recorded By, (t) = Taken By, (c) = Cosigned By    Initials Name Provider Type    LR Rukhsana Renee, PT Physical Therapist               Goals/Plan     Row Name 08/24/22 1411          Bed Mobility Goal 1 (PT)    [place] Level/Cues Needed (Bed Mobility Goal 1, PT) modified independence  -LR     Time Frame (Bed Mobility Goal 1, PT) long term goal (LTG);10 days  -LR     Progress/Outcomes (Bed Mobility Goal 1, PT) good progress toward goal;goal ongoing  -LR     Row Name 08/24/22 1411          Transfer Goal 1 (PT)    Activity/Assistive Device (Transfer Goal 1, PT) sit-to-stand/stand-to-sit;bed-to-chair/chair-to-bed;walker, rolling  -LR     [place] Level/Cues Needed (Transfer Goal 1, PT) minimum assist (75% or more patient effort);other (see comments)  2  -LR     Time Frame (Transfer Goal 1, PT) long term goal (LTG);10 days  -LR     Progress/Outcome (Transfer Goal 1, PT) continuing progress toward goal;goal ongoing  -     Row Name 08/24/22 1411           Gait Training Goal 1 (PT)    Activity/Assistive Device (Gait Training Goal 1, PT) gait (walking locomotion);walker, rolling  -LR     [place] Level (Gait Training Goal 1, PT) moderate assist (50-74% patient effort);other (see comments)  x2  -LR     Distance (Gait Training Goal 1, PT) 50 feet  -LR     Time Frame (Gait Training Goal 1, PT) long term goal (LTG);10 days  -LR     Progress/Outcome (Gait Training Goal 1, PT) progress slower than expected;goal ongoing  -LR           User Key  (r) = Recorded By, (t) = Taken By, (c) = Cosigned By    Initials Name Provider Type    LR Rukhsana Renee, PT Physical Therapist               Clinical Impression     Row Name 08/24/22 1411          Pain    Pretreatment Pain Rating 0/10 - no pain  -LR     Posttreatment Pain Rating 0/10 - no pain  -LR     Pain Intervention(s) Ambulation/increased activity;Repositioned  -LR     Row Name 08/24/22 1411          Plan of Care Review    Plan of Care Reviewed With patient  -LR     Progress improving  -LR     Outcome Evaluation Patient stood from EOB x3 with mod assist x2. Patient continues to have difficulty maintaining NWB on L foot during transition from sitting to standing but now once he is in static standing, he is able to pick L foot up off of floor and maintain NWB on L in static standing for prolonged period of time. Increased independence with supine<->sit t/f today. Continue to recommend IRF at d/c. Will continue to progress as able.  -LR     Row Name 08/24/22 1411          Therapy Assessment/Plan (PT)    Rehab Potential (PT) fair, will monitor progress closely  -LR     Criteria for Skilled Interventions Met (PT) yes;meets criteria;skilled treatment is necessary  -LR     Therapy Frequency (PT) daily  -LR     Row Name 08/24/22 1411          Positioning and Restraints    Pre-Treatment Position in bed  -LR     Post Treatment Position chair  -LR     In Chair notified nsg;reclined;sitting;call light within  reach;encouraged to call for assist;exit alarm on;legs elevated;waffle cushion;on mechanical lift sling  -LR           User Key  (r) = Recorded By, (t) = Taken By, (c) = Cosigned By    Initials Name Provider Type    Rukhsana Monge, PT Physical Therapist               Outcome Measures     Row Name 08/24/22 1411          How much help from another person do you currently need...    Turning from your back to your side while in flat bed without using bedrails? 3  -LR     Moving from lying on back to sitting on the side of a flat bed without bedrails? 3  -LR     Moving to and from a bed to a chair (including a wheelchair)? 1  -LR     Standing up from a chair using your arms (e.g., wheelchair, bedside chair)? 2  -LR     Climbing 3-5 steps with a railing? 1  -LR     To walk in hospital room? 1  -LR     AM-PAC 6 Clicks Score (PT) 11  -LR     Highest level of mobility 4 --> Transferred to chair/commode  -LR     Row Name 08/24/22 1411          Functional Assessment    Outcome Measure Options AM-PAC 6 Clicks Basic Mobility (PT)  -LR           User Key  (r) = Recorded By, (t) = Taken By, (c) = Cosigned By    Initials Name Provider Type    Rukhsana Monge, PT Physical Therapist                             Physical Therapy Education                 Title: PT OT SLP Therapies (In Progress)     Topic: Physical Therapy (In Progress)     Point: Mobility training (In Progress)     Learning Progress Summary           Patient Acceptance, E,D, VU,NR by LR at 8/23/2022 7602    Comment: Educated on precautions, NWB status of L LE, safety with mobility, LE HEP, correct supine<->sit t/f technique, correct sit<->stand t/f technique, and progression of POC.   Family Acceptance, E,D, NR by  at 8/22/2022 1808      Show all documentation for this point (17)                 Point: Home exercise program (In Progress)     Learning Progress Summary           Patient Acceptance, E,D, VU,NR by LR at 8/23/2022 1452    Comment:  Educated on precautions, NWB status of L LE, safety with mobility, LE HEP, correct supine<->sit t/f technique, correct sit<->stand t/f technique, and progression of POC.   Family Acceptance, E,D, NR by HP at 8/22/2022 1808      Show all documentation for this point (17)                 Point: Body mechanics (In Progress)     Learning Progress Summary           Patient Acceptance, E,D, VU,NR by LR at 8/23/2022 1452    Comment: Educated on precautions, NWB status of L LE, safety with mobility, LE HEP, correct supine<->sit t/f technique, correct sit<->stand t/f technique, and progression of POC.   Family Acceptance, E,D, NR by HP at 8/22/2022 1808      Show all documentation for this point (17)                 Point: Precautions (In Progress)     Learning Progress Summary           Patient Acceptance, E,D, VU,NR by LR at 8/23/2022 1452    Comment: Educated on precautions, NWB status of L LE, safety with mobility, LE HEP, correct supine<->sit t/f technique, correct sit<->stand t/f technique, and progression of POC.   Family Acceptance, E,D, NR by  at 8/22/2022 1808      Show all documentation for this point (17)                             User Key     Initials Effective Dates Name Provider Type Discipline     06/16/21 -  Rukhsana Renee, PT Physical Therapist PT     06/01/21 -  Thelma Rich PT Physical Therapist PT              PT Recommendation and Plan  Planned Therapy Interventions (PT): balance training, bed mobility training, gait training, home exercise program, patient/family education, ROM (range of motion), strengthening, transfer training  Plan of Care Reviewed With: patient  Progress: improving  Outcome Evaluation: Patient stood from EOB x3 with mod assist x2. Patient continues to have difficulty maintaining NWB on L foot during transition from sitting to standing but now once he is in static standing, he is able to pick L foot up off of floor and maintain NWB on L in static standing for  prolonged period of time. Increased independence with supine<->sit t/f today. Continue to recommend IRF at d/c. Will continue to progress as able.     Time Calculation:    PT Charges     Row Name 08/24/22 1411             Time Calculation    Start Time 1411  -LR      PT Received On 08/24/22  -LR      PT Goal Re-Cert Due Date 09/02/22  -LR              Timed Charges    05761 - PT Therapeutic Exercise Minutes 10  -LR      16977 - PT Therapeutic Activity Minutes 19  -LR              Total Minutes    Timed Charges Total Minutes 29  -LR       Total Minutes 29  -LR            User Key  (r) = Recorded By, (t) = Taken By, (c) = Cosigned By    Initials Name Provider Type    LR Rukhsana Renee, PT Physical Therapist              Therapy Charges for Today     Code Description Service Date Service Provider Modifiers Qty    82969645952 HC PT THER PROC EA 15 MIN 8/23/2022 Rukhsana Renee, PT GP 1    87924286472 HC PT THERAPEUTIC ACT EA 15 MIN 8/23/2022 Rukhsana Renee, PT GP 1    10661273430 HC PT THER SUPP EA 15 MIN 8/23/2022 Rukhsana Renee, PT GP 2    69806982861 HC PT THER PROC EA 15 MIN 8/24/2022 Rukhsana Renee, PT GP 1    33822058925 HC PT THERAPEUTIC ACT EA 15 MIN 8/24/2022 Rukhsana Renee, PT GP 1    79436678023 HC PT THER SUPP EA 15 MIN 8/24/2022 Rukhsana Renee, PT GP 2          PT G-Codes  Outcome Measure Options: AM-PAC 6 Clicks Basic Mobility (PT)  AM-PAC 6 Clicks Score (PT): 11  AM-PAC 6 Clicks Score (OT): 13    Rukhsana Renee PT  8/24/2022

## 2022-08-24 NOTE — PLAN OF CARE
Goal Outcome Evaluation:  Plan of Care Reviewed With: patient A&Ox4 VSS. Paced on tele.Repositioned frequently for comfort by staff. BLE elevated off bed.LLE  in surgical shoe. Pain controlled. FC patent to BSD. BM this shift. Wife present at bedside throughout shift 1L of oxygen placed during HS. Breathing TX given per order. Awaiting placement.

## 2022-08-25 LAB
ALBUMIN SERPL-MCNC: 3 G/DL (ref 3.5–5.2)
ANION GAP SERPL CALCULATED.3IONS-SCNC: 9 MMOL/L (ref 5–15)
BUN SERPL-MCNC: 41 MG/DL (ref 8–23)
BUN/CREAT SERPL: 24.8 (ref 7–25)
CALCIUM SPEC-SCNC: 8.2 MG/DL (ref 8.6–10.5)
CHLORIDE SERPL-SCNC: 99 MMOL/L (ref 98–107)
CO2 SERPL-SCNC: 29 MMOL/L (ref 22–29)
CREAT SERPL-MCNC: 1.65 MG/DL (ref 0.76–1.27)
DEPRECATED RDW RBC AUTO: 42.9 FL (ref 37–54)
EGFRCR SERPLBLD CKD-EPI 2021: 42.5 ML/MIN/1.73
ERYTHROCYTE [DISTWIDTH] IN BLOOD BY AUTOMATED COUNT: 12.9 % (ref 12.3–15.4)
GLUCOSE BLDC GLUCOMTR-MCNC: 122 MG/DL (ref 70–130)
GLUCOSE BLDC GLUCOMTR-MCNC: 131 MG/DL (ref 70–130)
GLUCOSE BLDC GLUCOMTR-MCNC: 139 MG/DL (ref 70–130)
GLUCOSE BLDC GLUCOMTR-MCNC: 237 MG/DL (ref 70–130)
GLUCOSE BLDC GLUCOMTR-MCNC: 53 MG/DL (ref 70–130)
GLUCOSE BLDC GLUCOMTR-MCNC: 59 MG/DL (ref 70–130)
GLUCOSE SERPL-MCNC: 60 MG/DL (ref 65–99)
HCT VFR BLD AUTO: 30.3 % (ref 37.5–51)
HGB BLD-MCNC: 9.7 G/DL (ref 13–17.7)
MCH RBC QN AUTO: 29.2 PG (ref 26.6–33)
MCHC RBC AUTO-ENTMCNC: 32 G/DL (ref 31.5–35.7)
MCV RBC AUTO: 91.3 FL (ref 79–97)
PHOSPHATE SERPL-MCNC: 3.5 MG/DL (ref 2.5–4.5)
PLATELET # BLD AUTO: 173 10*3/MM3 (ref 140–450)
PMV BLD AUTO: 13 FL (ref 6–12)
POTASSIUM SERPL-SCNC: 4 MMOL/L (ref 3.5–5.2)
RBC # BLD AUTO: 3.32 10*6/MM3 (ref 4.14–5.8)
SODIUM SERPL-SCNC: 137 MMOL/L (ref 136–145)
WBC NRBC COR # BLD: 8.33 10*3/MM3 (ref 3.4–10.8)

## 2022-08-25 PROCEDURE — 80053 COMPREHEN METABOLIC PANEL: CPT | Performed by: INTERNAL MEDICINE

## 2022-08-25 PROCEDURE — 86037 ANCA TITER EACH ANTIBODY: CPT | Performed by: INTERNAL MEDICINE

## 2022-08-25 PROCEDURE — 97530 THERAPEUTIC ACTIVITIES: CPT

## 2022-08-25 PROCEDURE — 82962 GLUCOSE BLOOD TEST: CPT

## 2022-08-25 PROCEDURE — 85027 COMPLETE CBC AUTOMATED: CPT | Performed by: PHYSICIAN ASSISTANT

## 2022-08-25 PROCEDURE — 84100 ASSAY OF PHOSPHORUS: CPT | Performed by: INTERNAL MEDICINE

## 2022-08-25 PROCEDURE — 94799 UNLISTED PULMONARY SVC/PX: CPT

## 2022-08-25 PROCEDURE — 83516 IMMUNOASSAY NONANTIBODY: CPT | Performed by: INTERNAL MEDICINE

## 2022-08-25 PROCEDURE — 97110 THERAPEUTIC EXERCISES: CPT

## 2022-08-25 PROCEDURE — 99024 POSTOP FOLLOW-UP VISIT: CPT | Performed by: PHYSICIAN ASSISTANT

## 2022-08-25 PROCEDURE — 63710000001 INSULIN LISPRO (HUMAN) PER 5 UNITS: Performed by: FAMILY MEDICINE

## 2022-08-25 PROCEDURE — 83605 ASSAY OF LACTIC ACID: CPT | Performed by: PHYSICIAN ASSISTANT

## 2022-08-25 PROCEDURE — 99232 SBSQ HOSP IP/OBS MODERATE 35: CPT | Performed by: PEDIATRICS

## 2022-08-25 PROCEDURE — 63710000001 INSULIN LISPRO (HUMAN) PER 5 UNITS: Performed by: INTERNAL MEDICINE

## 2022-08-25 RX ORDER — BUMETANIDE 1 MG/1
2 TABLET ORAL 2 TIMES DAILY
Status: DISCONTINUED | OUTPATIENT
Start: 2022-08-25 | End: 2022-08-25

## 2022-08-25 RX ORDER — BUMETANIDE 1 MG/1
2 TABLET ORAL
Status: DISCONTINUED | OUTPATIENT
Start: 2022-08-25 | End: 2022-08-26 | Stop reason: HOSPADM

## 2022-08-25 RX ADMIN — BETHANECHOL CHLORIDE 10 MG: 10 TABLET ORAL at 08:34

## 2022-08-25 RX ADMIN — BUMETANIDE 2 MG: 1 TABLET ORAL at 17:26

## 2022-08-25 RX ADMIN — LEVOTHYROXINE SODIUM 50 MCG: 50 TABLET ORAL at 05:31

## 2022-08-25 RX ADMIN — IPRATROPIUM BROMIDE AND ALBUTEROL SULFATE 3 ML: .5; 3 SOLUTION RESPIRATORY (INHALATION) at 20:56

## 2022-08-25 RX ADMIN — INSULIN LISPRO 3 UNITS: 100 INJECTION, SOLUTION INTRAVENOUS; SUBCUTANEOUS at 17:26

## 2022-08-25 RX ADMIN — Medication 5000 UNITS: at 08:34

## 2022-08-25 RX ADMIN — AMOXICILLIN AND CLAVULANATE POTASSIUM 500 MG: 500; 125 TABLET, FILM COATED ORAL at 20:37

## 2022-08-25 RX ADMIN — DEXTROSE MONOHYDRATE 25 G: 25 INJECTION, SOLUTION INTRAVENOUS at 07:56

## 2022-08-25 RX ADMIN — APIXABAN 5 MG: 5 TABLET, FILM COATED ORAL at 08:34

## 2022-08-25 RX ADMIN — INSULIN LISPRO 5 UNITS: 100 INJECTION, SOLUTION INTRAVENOUS; SUBCUTANEOUS at 17:26

## 2022-08-25 RX ADMIN — TAMSULOSIN HYDROCHLORIDE 0.4 MG: 0.4 CAPSULE ORAL at 08:34

## 2022-08-25 RX ADMIN — AMOXICILLIN AND CLAVULANATE POTASSIUM 500 MG: 500; 125 TABLET, FILM COATED ORAL at 08:35

## 2022-08-25 RX ADMIN — CETIRIZINE HYDROCHLORIDE 5 MG: 10 TABLET, FILM COATED ORAL at 08:34

## 2022-08-25 RX ADMIN — APIXABAN 5 MG: 5 TABLET, FILM COATED ORAL at 20:37

## 2022-08-25 RX ADMIN — BETHANECHOL CHLORIDE 10 MG: 10 TABLET ORAL at 23:32

## 2022-08-25 RX ADMIN — ASPIRIN 81 MG CHEWABLE TABLET 81 MG: 81 TABLET CHEWABLE at 08:34

## 2022-08-25 RX ADMIN — BUMETANIDE 2 MG: 0.25 INJECTION INTRAMUSCULAR; INTRAVENOUS at 08:35

## 2022-08-25 RX ADMIN — BETHANECHOL CHLORIDE 10 MG: 10 TABLET ORAL at 16:20

## 2022-08-25 RX ADMIN — ACETAMINOPHEN 650 MG: 325 TABLET, FILM COATED ORAL at 20:37

## 2022-08-25 NOTE — PLAN OF CARE
Goal Outcome Evaluation:                Pt hypoglycemic in the AM- IV dextrose administered and corrected BS. Pt alert, oriented and pleasant. Pt participating well with therapy. Will monitor.

## 2022-08-25 NOTE — PLAN OF CARE
Patient with No new changes tonight. VSS. Room air. He was restless majority of night. No complaint of pain. Fonseca in place. Plan is possible transfer to [facility] when medically ready.

## 2022-08-25 NOTE — THERAPY TREATMENT NOTE
Patient Name: Jermaine Singh  : 1945    MRN: [id number]                              Today's Date: 2022       Admit Date: 2022    Visit Dx:     ICD-10-CM ICD-9-CM   1. Dislocation of left shoulder joint, initial encounter  S43.005A 831.00   2. Cellulitis of left foot  L03.116 682.7   3. 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  Preserved LV function   • Type 2 diabetes mellitus (HCC)   • Hypertension   • Hyperlipidemia   • Hypothyroidism (acquired)   • Vitamin D deficiency on Rx    • Serum Cr 1.32 on admission.  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 Au   • Dizziness   • Atherosclerosis of native artery of both lower extremities with intermittent claudication ([facility])   • SSS (sick sinus syndrome) ([facility])   • Dislocation of left shoulder joint, initial encounter   • Cellulitis in diabetic foot ([facility])   • Fall   • Lactic acidosis   • Proximal phalanx fracture of the second digit extending into the second metatarsal joint   • Acute renal failure (ARF) ([facility])   • Acute systolic heart failure ([facility])   • DVT, bilateral lower limbs ([facility])   • Acute respiratory failure with hypoxia ([facility])     Past Medical History:   Diagnosis Date   • Asthma    • Coronary artery disease    • Diabetes mellitus (HCC) 2000    started on [place] 2018; started on po meds in ; checking blood sugars daily    • Disease of thyroid gland     po meds daily for hypothyroidism    • History of fracture as a child     rt leg- severe    • Hyperlipidemia    • Hypertension    • Hypothyroidism    • Peripheral neuropathy    • Peripheral vascular disease (HCC)     s/p angiogram -needs stent in left leg    • RBBB    • Right knee pain    • Vitamin D deficiency      Past Surgical History:   Procedure Laterality Date   • APPENDECTOMY     • CARDIAC CATHETERIZATION N/A 2019     Procedure: Left Heart Cath;  Surgeon: Cooper Apodaca MD;  Location:  [facility];  Service: Cardiology   • CARDIAC ELECTROPHYSIOLOGY PROCEDURE N/A 5/18/2022    Procedure: DEVICE IMPLANT;  Surgeon: Cooper Apodaca MD;  Location:  [facility];  Service: Cardiology;  Laterality: N/A;   • COLONOSCOPY     • CORONARY ARTERY BYPASS GRAFT N/A 3/22/2019    Procedure: MEDIAN STERNOTOMY, CORONARY ARTERY BYPASS GRAFT X3, UTILIZING THE LEFT INTERNAL MAMMARY ARTERY, EVH AND OPEN HARVEST OF THE RIGHT GREATER SAPHENOUS VEIN, EXPLORATION OF THE LEFT LEG;  Surgeon: Ap Au MD;  Location:  [facility];  Service: Cardiothoracic   • EYE SURGERY Bilateral     cataracts    • INTERVENTIONAL RADIOLOGY PROCEDURE N/A 7/29/2021    Procedure: Abdominal Aortagram with Runoff;  Surgeon: Jermaine Hernandez MD;  Location:  [facility];  Service: Cardiovascular;  Laterality: N/A;   • KNEE ARTHROSCOPY      right x 2, left x 1   • LACERATION REPAIR      right leg   • LEG SURGERY      2 for fracture of rt leg    • TONSILLECTOMY      Adnoidectomy   • TRANS METATARSAL AMPUTATION Left 8/2/2022    Procedure: GREAT TOE AMPUTATION LEFT;  Surgeon: Gopal Márquez MD;  Location:  [facility];  Service: Vascular;  Laterality: Left;      General Information     Row Name 08/25/22 1454          Physical Therapy Time and Intention    Document Type therapy note (daily note)  -FW     Mode of Treatment physical therapy  -FW     Row Name 08/25/22 1454          General Information    Patient Profile Reviewed yes  -FW     Existing Precautions/Restrictions fall;left;non-weight bearing;other (see comments)  WBAT LUE, NWB LLE with post-op shoe  -FW     Row Name 08/25/22 1454          Cognition    Orientation Status (Cognition) oriented x 4  -FW     Row Name 08/25/22 1454          Safety Issues, Functional Mobility    Impairments Affecting Function (Mobility)  balance;strength;cognition;endurance/activity tolerance;postural/trunk control;pain;range of motion (ROM);motor control;motor planning;sensation/sensory awareness  -           User Key  (r) = Recorded By, (t) = Taken By, (c) = Cosigned By    Initials Name Provider Type     Jorge Iyer PT Physical Therapist               Mobility     Row Name 08/25/22 1455          Bed Mobility    Supine-Sit [place] (Bed Mobility) verbal cues;standby assist  -     Row Name 08/25/22 1455          Bed-Chair Transfer    Bed-Chair [place] (Transfers) verbal cues;2 person assist;moderate assist (50% patient effort)  -     Comment, (Bed-Chair Transfer) stand pivot; pt unable to maintain NWB during STS and intermittently touched down through his LLE during bed to chair transfer despite max verbal and tactile cues  -     Row Name 08/25/22 1455          Sit-Stand Transfer    Sit-Stand [place] (Transfers) moderate assist (50% patient effort);2 person assist  -     Assistive Device (Sit-Stand Transfers) walker, front-wheeled  -     Row Name 08/25/22 1455          Gait/Stairs (Locomotion)    Comment, (Gait/Stairs) not safe to progress to three point gait pattern due to inability to maintain NWB  -     Row Name 08/25/22 1455          Mobility    Extremity Weight-bearing Status left upper extremity;left lower extremity  -FW     Left Upper Extremity (Weight-bearing Status) weight-bearing as tolerated (WBAT)  -FW     Left Lower Extremity (Weight-bearing Status) non weight-bearing (NWB)   -           User Key  (r) = Recorded By, (t) = Taken By, (c) = Cosigned By    Initials Name Provider Type     Jorge Iyer PT Physical Therapist               Obj/Interventions     Row Name 08/25/22 1457          Motor Skills    Therapeutic Exercise hip;knee;ankle  therex included 10 bilateral reps of: gluteal sets, quad sets, hip abd/add, hip er/ir, LAQ, SLR, and ankle pumps  -     Row Name 08/25/22 7157           Balance    Balance Assessment sitting static balance;sitting dynamic balance;standing static balance;standing dynamic balance  -FW     Static Sitting Balance supervision  -FW     Dynamic Sitting Balance standby assist  -FW     Position, Sitting Balance sitting edge of bed  -FW     Static Standing Balance minimal assist  -FW     Dynamic Standing Balance minimal assist  -FW     Position/Device Used, Standing Balance supported;walker, front-wheeled  -FW           User Key  (r) = Recorded By, (t) = Taken By, (c) = Cosigned By    Initials Name Provider Type    FW Jorge Iyer, SETH Physical Therapist               Goals/Plan    No documentation.                Clinical Impression     Row Name 08/25/22 1459          Pain    Pretreatment Pain Rating 0/10 - no pain  -FW     Posttreatment Pain Rating 0/10 - no pain  -FW     Row Name 08/25/22 1459          Plan of Care Review    Plan of Care Reviewed With patient  -FW     Outcome Evaluation Pt uanble to maintain NWB status upon initial sit to stand and was returned to the seated position. Pt able to maintain NWB with static standing. SPT from bed to chair w/ a RW requiring mod Ax2 to maintain NWB. Continue PT POC.  -FW     Row Name 08/25/22 1459          Vital Signs    Pre Systolic BP Rehab --  vss  -FW     O2 Delivery Pre Treatment room air  -FW     O2 Delivery Intra Treatment room air  -FW     O2 Delivery Post Treatment room air  -FW     Pre Patient Position Supine  -FW     Intra Patient Position Standing  -FW     Post Patient Position Sitting  -FW     Row Name 08/25/22 1459          Positioning and Restraints    Pre-Treatment Position in bed  -FW     Post Treatment Position chair  -FW     In Chair notified nsg;reclined;sitting;call light within reach;encouraged to call for assist;exit alarm on;legs elevated;waffle cushion;on mechanical lift sling  -FW           User Key  (r) = Recorded By, (t) = Taken By, (c) = Cosigned By    Initials Name Provider Type    FW Jaylon  Jorge, SETH Physical Therapist               Outcome Measures     Row Name 08/25/22 1503          How much help from another person do you currently need...    Turning from your back to your side while in flat bed without using bedrails? 4  -FW     Moving from lying on back to sitting on the side of a flat bed without bedrails? 3  -FW     Moving to and from a bed to a chair (including a wheelchair)? 2  -FW     Standing up from a chair using your arms (e.g., wheelchair, bedside chair)? 2  -FW     Climbing 3-5 steps with a railing? 1  -FW     To walk in hospital room? 1  -FW     AM-PAC 6 Clicks Score (PT) 13  -FW     Highest level of mobility 4 --> Transferred to chair/commode  -FW     Row Name 08/25/22 1503          Functional Assessment    Outcome Measure Options AM-PAC 6 Clicks Basic Mobility (PT)  -FW           User Key  (r) = Recorded By, (t) = Taken By, (c) = Cosigned By    Initials Name Provider Type    FW Jorge Iyer PT Physical Therapist                             Physical Therapy Education                 Title: PT OT SLP Therapies (In Progress)     Topic: Physical Therapy (In Progress)     Point: Mobility training (In Progress)     Learning Progress Summary           Patient Acceptance, E, VU,NR by  at 8/25/2022 1504   Family Acceptance, E,D, NR by  at 8/22/2022 1808      Show all documentation for this point (18)                 Point: Home exercise program (In Progress)     Learning Progress Summary           Patient Acceptance, E, VU,NR by  at 8/25/2022 1504   Family Acceptance, E,D, NR by  at 8/22/2022 1808      Show all documentation for this point (18)                 Point: Body mechanics (In Progress)     Learning Progress Summary           Patient Acceptance, E, VU,NR by  at 8/25/2022 1504   Family Acceptance, E,D, NR by  at 8/22/2022 1808      Show all documentation for this point (18)                 Point: Precautions (In Progress)     Learning Progress Summary            Patient Acceptance, E, VU,NR by  at 8/25/2022 1504   Family Acceptance, E,D, NR by  at 8/22/2022 1808      Show all documentation for this point (18)                             User Key     Initials Effective Dates Name Provider Type Discipline     05/05/22 -  Jorge Iyer, PT Physical Therapist PT    HP 06/01/21 -  Thelma Rich, SETH Physical Therapist PT              PT Recommendation and Plan  Planned Therapy Interventions (PT): balance training, bed mobility training, gait training, home exercise program, joint mobilization, neuromuscular re-education, orthotic fitting/training, patient/family education, ROM (range of motion), stair training, strengthening, transfer training, stretching  Plan of Care Reviewed With: patient  Outcome Evaluation: Pt uanble to maintain NWB status upon initial sit to stand and was returned to the seated position. Pt able to maintain NWB with static standing. SPT from bed to chair w/ a RW requiring mod Ax2 to maintain NWB. Continue PT POC.     Time Calculation:    PT Charges     Row Name 08/25/22 1506             Time Calculation    Start Time 1428  -FW      PT Received On 08/25/22  -FW      PT Goal Re-Cert Due Date 09/02/22  -FW              Timed Charges    18413 - PT Therapeutic Exercise Minutes 10  -FW      41888 - PT Therapeutic Activity Minutes 13  -FW              Total Minutes    Timed Charges Total Minutes 23  -FW       Total Minutes 23  -FW            User Key  (r) = Recorded By, (t) = Taken By, (c) = Cosigned By    Initials Name Provider Type     Jorge Iyer PT Physical Therapist              Therapy Charges for Today     Code Description Service Date Service Provider Modifiers Qty    35348798247 HC PT THER PROC EA 15 MIN 8/25/2022 Jorge Iyer, PT GP 1    48778488733 HC PT THERAPEUTIC ACT EA 15 MIN 8/25/2022 Jorge Iyer, PT GP 1    67424337204 HC PT THER SUPP EA 15 MIN 8/25/2022 Jorge Iyer, PT GP 2          PT G-Codes  Outcome  Measure Options: AM-PAC 6 Clicks Basic Mobility (PT)  AM-PAC 6 Clicks Score (PT): 13  AM-PAC 6 Clicks Score (OT): 15    Jorge Iyer, SETH  8/25/2022

## 2022-08-25 NOTE — PROGRESS NOTES
Cardiothoracic Surgery Progress Note      POD # 24 -left great toe transmetatarsal amputation primary closure     LOS: 29 days      Subjective:   In bed having breakfast.  States blood sugar dropped last night.  Otherwise doing well    Objective:  Vital Signs vital signs below noted T-max past 24 hours 97.8 °F  Temp:  [96.3 °F (35.7 °C)-98.4 °F (36.9 °C)] 97.6 °F (36.4 °C)  Heart Rate:  [57-89] 58  Resp:  [14-18] 16  BP: (102-116)/(69-88) 102/69    Physical Exam:   General Appearance: .  Alert and oriented   Lungs: Clear to auscultation   Heart: Regular rate and rhythm   Skin: Ulceration middle phalangeal joint left second toe with exposed phalangeal bone/joint space   Incision:     Results:  Results from last 7 days   Lab Units 08/21/22  0646   WBC 10*3/mm3 9.20   HEMOGLOBIN g/dL 10.1*   HEMATOCRIT % 30.2*   PLATELETS 10*3/mm3 163     Results from last 7 days   Lab Units 08/23/22  2207   SODIUM mmol/L 140   POTASSIUM mmol/L 5.0   CHLORIDE mmol/L 100   CO2 mmol/L 30.0*   BUN mg/dL 51*   CREATININE mg/dL 1.84*   GLUCOSE mg/dL 190*   CALCIUM mg/dL 8.4*         Assessment: #1.  Postop day 24left great toe transmetatarsal amputation primary closure overall healing well with some ischemia at the proximal third  To expose middle phalangeal joint space with exposed bone secondary to hammertoe phenomena and chronic diabetic ulceration  3 status post remote coronary bypass grafting  4 status post remote pacemaker placement for sick sinus syndrome  5.  Recent fall with rib shoulder dislocation reduced in the emergency department on this admission  6.  Endovascular angioplasty stenting right lower extremity per Dr. Hernandez 2019        Plan: Daily dressing changes on amputation site as well as over the ulceration the middle phalangeal joint space of the left second toe.  Antibiotics per infectious disease.  Medical manage per hospitalist/cardiologist.  Renal medicine following closely for increasing creatinine and BUN, seems to  be improving today.  Cleared for discharge from a surgical perspective per primary service.  Incision examined by Dr. Byrd this morning and redressed.  Patient awaiting transfer to [facility] tomorrow    RANULFO Barroso - 08/25/22 - 07:29 EDT

## 2022-08-25 NOTE — NURSING NOTE
Called to pt's room for breathing treatment. Upon RT entering room, pt asleep. No distress noted. Tx not given

## 2022-08-25 NOTE — PLAN OF CARE
Goal Outcome Evaluation:  Plan of Care Reviewed With: patient           Outcome Evaluation: Pt unable to maintain NWB status upon initial sit to stand and was returned to the seated position. Pt able to maintain NWB with static standing once up with a RW. SPT from bed to chair w/ a RW requiring mod Ax2 to maintain NWB. Continue PT POC.

## 2022-08-25 NOTE — PROGRESS NOTES
"   LOS: 29 days    Patient Care Team:  Farooq Painter MD as PCP - General (Family Medicine)  Jermaine Hernandez MD as Consulting Physician (Cardiology)      Subjective     Interval History:     No acute events overnight. No new complaints   Feeling better       Review of Systems:   No CP or SOA , fever, chills, rigors, rash, N/V, Constipation.       Objective     Vital Sign Min/Max for last 24 hours  Temp  Min: 96.2 °F (35.7 °C)  Max: 97.6 °F (36.4 °C)   BP  Min: 102/69  Max: 116/71   Pulse  Min: 58  Max: 89   Resp  Min: 16  Max: 18   SpO2  Min: 90 %  Max: 96 %   No data recorded   No data recorded     Flowsheet Rows    Flowsheet Row First Filed Value   Admission Height 190.5 cm (75\") Documented at 07/27/2022 1848   Admission Weight 98 kg (216 lb) Documented at 07/27/2022 1848          I/O this shift:  In: -   Out: 150 [Urine:150]  I/O last 3 completed shifts:  In: 490 [P.O.:490]  Out: 1500 [Urine:1500]    Physical Exam:    Gen: Alert, NAD   HENT: NC, AT, EOMI   NECK: Supple, no JVD, Trachea midline   LUNGS: CTA bilaterally, non labored respirtation   CVS: S1/S2 audible, RRR, no murmur   Abd: Soft, NT, ND, BS+   Ext: + pedal edema, no cyanosis   CNS: Alert, No focal deficit noted grossly  Psy: Cooperative  Skin: Warm, dry and intact      WBC No results found for: WBC   HGB No results found for: HGB   HCT No results found for: HCT   Platlets No results found for: LABPLAT   MCV No results found for: MCV       Sodium Sodium   Date Value Ref Range Status   08/25/2022 137 136 - 145 mmol/L Final   08/23/2022 140 136 - 145 mmol/L Final      Potassium Potassium   Date Value Ref Range Status   08/25/2022 4.0 3.5 - 5.2 mmol/L Final   08/23/2022 5.0 3.5 - 5.2 mmol/L Final     Comment:     Slight hemolysis detected by analyzer. Results may be affected.      Chloride Chloride   Date Value Ref Range Status   08/25/2022 99 98 - 107 mmol/L Final   08/23/2022 100 98 - 107 mmol/L Final      CO2 CO2   Date Value Ref Range Status "   08/25/2022 29.0 22.0 - 29.0 mmol/L Final   08/23/2022 30.0 (H) 22.0 - 29.0 mmol/L Final      BUN BUN   Date Value Ref Range Status   08/25/2022 41 (H) 8 - 23 mg/dL Final   08/23/2022 51 (H) 8 - 23 mg/dL Final      Creatinine Creatinine   Date Value Ref Range Status   08/25/2022 1.65 (H) 0.76 - 1.27 mg/dL Final   08/23/2022 1.84 (H) 0.76 - 1.27 mg/dL Final      Calcium Calcium   Date Value Ref Range Status   08/25/2022 8.2 (L) 8.6 - 10.5 mg/dL Final   08/23/2022 8.4 (L) 8.6 - 10.5 mg/dL Final      PO4 No results found for: CAPO4   Albumin Albumin   Date Value Ref Range Status   08/25/2022 3.00 (L) 3.50 - 5.20 g/dL Final   08/23/2022 3.10 (L) 3.50 - 5.20 g/dL Final      Magnesium No results found for: MG   Uric Acid No results found for: URICACID        Results Review:     I reviewed the patient's new clinical results.    amLODIPine, 10 mg, Oral, Daily  amoxicillin-clavulanate, 1 tablet, Oral, Q12H  apixaban, 5 mg, Oral, Q12H  aspirin, 81 mg, Oral, Daily  bethanechol, 10 mg, Oral, TID  bumetanide, 2 mg, Intravenous, Daily  cetirizine, 5 mg, Oral, Daily  insulin detemir, 10 Units, Subcutaneous, Nightly  insulin lispro, 0-9 Units, Subcutaneous, TID AC  Insulin Lispro, 5 Units, Subcutaneous, TID With Meals  levothyroxine, 50 mcg, Oral, Q AM  metoprolol succinate XL, 50 mg, Oral, Q24H  polyethylene glycol, 17 g, Oral, Daily  senna-docusate sodium, 2 tablet, Oral, BID  sodium chloride, 10 mL, Intravenous, Q12H  tamsulosin, 0.4 mg, Oral, Daily  vitamin D3, 5,000 Units, Oral, Daily           Medication Review:     Assessment & Plan       Acute renal failure (ARF) (HCC)    Type 2 diabetes mellitus (HCC)    Hypertension    Hyperlipidemia    Hypothyroidism (acquired)    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 Au    SSS (sick sinus syndrome) (HCC)    Dislocation of left shoulder joint, initial encounter    Cellulitis in diabetic foot (HCC)    Fall    Proximal phalanx fracture of the second digit extending into the second  metatarsal joint    Acute systolic heart failure (HCC)    DVT, bilateral lower limbs (HCC)    Acute respiratory failure with hypoxia (HCC)    1- [place] - non oliguric -  Pre- renal azotemia/ATN (Hypotension vs vancomycin related) vs AIN - Renal duplex - no WILMA. Improving. UA Microscopic hematuria RBC TNTC. TRISH(-), Complement level normal.  ANCA and Anti GBM pending.   2- HTN   3- Anemia    4- Osteomyelitis - left toe   5- DM   6- CAD s/p CABG 3/22/19 - EF 45- 50%  7- Sick sinus syndrome   8- Proteinuria - UPCR 0.38  9- Pulmonary edema  - improving.     Plan:  - Continue with current. Will switch to oral diuretics  - Avoid nephrotoxic agents.   - Monitor I/O   - Renal diet.   - Adjust meds per renal function   - No emergent need of RRT.     Semaj Ly MD  08/25/22  11:37 EDT

## 2022-08-25 NOTE — CASE MANAGEMENT/SOCIAL WORK
Continued Stay Note  [facility]     Patient Name: Jermaine Singh  MRN: [id number]  Today's Date: 8/25/2022    Admit Date: 7/27/2022     Discharge Plan     Row Name 08/25/22 1632       Plan    Plan [facility]    Plan Comments case mgt f/u. Plan is to transfer to an acute level rehab bed at [facility] tomorrow 8/26 if medically ready, he and wife in agreement with plan. [facility] scheduled for transport at 1400. Case mgt will f/u in am    Final Discharge Disposition Code 62 - inpatient rehab facility               Discharge Codes    No documentation.               Expected Discharge Date and Time     Expected Discharge Date Expected Discharge Time    Aug 26, 2022             Sonja C Kellerman, RN

## 2022-08-25 NOTE — PROGRESS NOTES
[facility] Medicine Services  PROGRESS NOTE    Patient Name: Jermaine Singh  : 1945  MRN: [id number]    Date of Admission: 2022  Primary Care Physician: Farooq Painter MD    Subjective   Subjective     CC:  Follow-up renal failure, foot infection    HPI:  Pt is doing well today.  Has no complaints.  BS was low this AM, got 2 OJ and still required D50.  Pt didn't have sx, but states that he was sleepy.    ROS:  Gen- No fevers, chills  CV- No chest pain, palpitations  Resp- No cough, dyspnea  GI- No N/V/D, abd pain    Objective   Objective     Vital Signs:   Temp:  [96.2 °F (35.7 °C)-97.6 °F (36.4 °C)] 96.3 °F (35.7 °C)  Heart Rate:  [58-89] 62  Resp:  [16-18] 18  BP: (102-113)/(69-79) 113/75     Physical Exam:  Constitutional: Awake, alert, no acute distress, lying in bed  HENT: NCAT, mucous membranes moist  Respiratory: Clear to auscultation bilaterally, respiratory effort normal   Cardiovascular: RRR, no murmurs, rubs, or gallops, palpable radial pulses  Gastrointestinal: Positive bowel sounds, soft, nontender, nondistended  Musculoskeletal: LT foot with postoperative bandaging  Psychiatric: Appropriate affect, cooperative  Neurologic: Speech clear and fluent, moving extremities spontaneously    Results Reviewed:  LAB RESULTS:      Lab 22  0646 22  0639 22  0333   WBC 9.20 9.07 11.20*   HEMOGLOBIN 10.1* 9.8* 10.7*   HEMATOCRIT 30.2* 28.8* 31.9*   PLATELETS 163 157 160   MCV 87.3 86.2 87.2         Lab 22  0746 22  2207 22  0656 22  0646 22  0639   SODIUM 137 140 138 137 136   POTASSIUM 4.0 5.0 4.7 4.3 4.4   CHLORIDE 99 100 98 96* 96*   CO2 29.0 30.0* 32.0* 27.0 30.0*   ANION GAP 9.0 10.0 8.0 14.0 10.0   BUN 41* 51* 73* 74* 86*   CREATININE 1.65* 1.84* 2.64* 2.71* 2.97*   EGFR 42.5* 37.3* 24.2* 23.4* 21.0*   GLUCOSE 60* 190* 194* 215* 139*   CALCIUM 8.2* 8.4* 8.4* 8.1* 8.2*   PHOSPHORUS 3.5 3.7 4.5  --   --          Lab  08/25/22  0746 08/23/22  2207 08/22/22  0656   ALBUMIN 3.00* 3.10* 3.00*                     Brief Urine Lab Results  (Last result in the past 365 days)      Color   Clarity   Blood   Leuk Est   Nitrite   Protein   CREAT   Urine HCG        08/23/22 1309             59.2         08/23/22 1309 [place]   Cloudy   Large (3+)   Trace   Negative   30 mg/dL (1+)                 Microbiology Results Abnormal     Procedure Component Value - Date/Time    Eosinophil Smear - Urine, Urine, Clean Catch [[id number]]  (Normal) Collected: 08/14/22 1818    Lab Status: Final result Specimen: Urine, Clean Catch Updated: 08/14/22 2101     Eosinophil Smear 0 % EOS/100 Cells     Narrative:      No eosinophil seen    Anaerobic Culture - Tissue, Toe, Left [[id number]] Collected: 08/02/22 1027    Lab Status: Final result Specimen: Tissue from Toe, Left Updated: 08/07/22 0858     Anaerobic Culture No anaerobes isolated at 5 days    Blood Culture - Blood, Hand, Left [[id number]]  (Normal) Collected: 07/27/22 2040    Lab Status: Final result Specimen: Blood from Hand, Left Updated: 08/01/22 2132     Blood Culture No growth at 5 days    Blood Culture - Blood, Hand, Right [[id number]]  (Normal) Collected: 07/27/22 2030    Lab Status: Final result Specimen: Blood from Hand, Right Updated: 08/01/22 2132     Blood Culture No growth at 5 days    COVID PRE-OP / PRE-PROCEDURE SCREENING ORDER (NO ISOLATION) - Swab, Nasopharynx [[id number]]  (Normal) Collected: 07/27/22 2246    Lab Status: Final result Specimen: Swab from Nasopharynx Updated: 07/27/22 2357    Narrative:      The following orders were created for panel order COVID PRE-OP / PRE-PROCEDURE SCREENING ORDER (NO ISOLATION) - Swab, Nasopharynx.  Procedure                               Abnormality         Status                     ---------                               -----------         ------                     COVID-19 and FLU A/B PCR...[[id number]]  Normal              Final result                  Please view results for these tests on the individual orders.    COVID-19 and FLU A/B PCR - Swab, Nasopharynx [[id number]]  (Normal) Collected: 07/27/22 2246    Lab Status: Final result Specimen: Swab from Nasopharynx Updated: 07/27/22 1697     COVID19 Not Detected     Influenza A PCR Not Detected     Influenza B PCR Not Detected    Narrative:      Fact sheet for providers: https://www.fda.gov/media/946272/download    Fact sheet for patients: https://www.fda.gov/media/962707/download    Test performed by PCR.          No radiology results from the last 24 hrs    Results for orders placed during the hospital encounter of 07/27/22    Adult Transthoracic Echo Complete W/ Cont if Necessary Per Protocol    Interpretation Summary  · Left ventricular ejection fraction appears to be 46 - 50%. Left ventricular systolic function is low normal.  · Left ventricular septal hypokinesis  · No significant valvular heart disease      I have reviewed the medications:  Scheduled Meds:amLODIPine, 10 mg, Oral, Daily  amoxicillin-clavulanate, 1 tablet, Oral, Q12H  apixaban, 5 mg, Oral, Q12H  aspirin, 81 mg, Oral, Daily  bethanechol, 10 mg, Oral, TID  bumetanide, 2 mg, Oral, BID  cetirizine, 5 mg, Oral, Daily  insulin detemir, 10 Units, Subcutaneous, Nightly  insulin lispro, 0-9 Units, Subcutaneous, TID AC  Insulin Lispro, 5 Units, Subcutaneous, TID With Meals  levothyroxine, 50 mcg, Oral, Q AM  metoprolol succinate XL, 50 mg, Oral, Q24H  polyethylene glycol, 17 g, Oral, Daily  senna-docusate sodium, 2 tablet, Oral, BID  sodium chloride, 10 mL, Intravenous, Q12H  tamsulosin, 0.4 mg, Oral, Daily  vitamin D3, 5,000 Units, Oral, Daily      Continuous Infusions:   PRN Meds:.•  acetaminophen **OR** acetaminophen **OR** acetaminophen  •  benzocaine-menthol  •  bisacodyl  •  calcium carbonate  •  dextrose  •  dextrose  •  Diclofenac Sodium  •  glucagon (human recombinant)  •  HYDROcodone-acetaminophen  •  ipratropium-albuterol  •   nitroglycerin  •  ondansetron **OR** ondansetron  •  phenol  •  prochlorperazine  •  sennosides-docusate  •  simethicone  •  sodium chloride    Assessment & Plan   Assessment & Plan     Active Hospital Problems    Diagnosis  POA   • **Acute renal failure (ARF) ([facility]) [N17.9]  Yes   • Acute systolic heart failure ([facility]) [I50.21]  No   • DVT, bilateral lower limbs ([facility]) [I82.403]  No   • Acute respiratory failure with hypoxia ([facility]) [J96.01]  No   • Proximal phalanx fracture of the second digit extending into the second metatarsal joint [S92.919A]  Yes   • Dislocation of left shoulder joint, initial encounter [S43.005A]  Yes   • Cellulitis in diabetic foot ([facility]) [E11.628, L03.119]  Yes   • Fall [W19.XXXA]  Yes   • SSS (sick sinus syndrome) ([facility]) [I49.5]  Yes   • Hyperlipidemia [E78.5]  Yes   • Hypertension [I10]  Yes   • S/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 Au [Z95.1]  Not Applicable   • Hypothyroidism (acquired) [E03.9]  Yes   • Type 2 diabetes mellitus ([facility]) [E11.9]  Yes      Resolved Hospital Problems    Diagnosis Date Resolved POA   • Sepsis ([facility]) [A41.9] 08/21/2022 Yes        Brief Hospital Course to date:  Jermaine Singh is a 77 y.o. male w/ DM2, CAD s/p prior CABG, PVD s/p RT LE angioplasty 7/2021 who initially presented to the ED 7/27/22 after sustaining a fall while walking his dog, identified to have LT shoulder dislocation reduced in the ED, at that time he had systemic markers for illness, admitted for concern of osteomyelitis and started on empiric IV antibiotics; he is now s/p LT first toe amputation 8/2 w/ Dr. Márquez; his hospitalization has been prolonged/complicated by acute pulmonary disease with marked hypoxia, and significant renal dysfunction; he has been followed by ID, nephrology, cardiology with slow but gradual recovery    Assessment/plan    Acute hypoxic respiratory insufficiency, improved  Acute HFrEF  -Cardiology following, interpreted EF 40%  - VQ scan interpreted as low probability  - Oral  Toprol-XL; other meds limited 2/2 renal disease; cards following  -Renal failure likely a contributing factor  -cont diuretics.  -Stable on room air today    Acute renal failure on likely CKD 2  -Base eGFR 60-70s;Cr 0.9-1.2  - Renal US unrevealing, renal arterial duplex w/o evidence for stenosis  -Creatinine peaked 3.23--cont to improve.  -Nephrology follows, switched to oral diuretics in anticipation for d/c.  - Significant improvement on last labs      LT first toe osteomyelitis/cellulitis, polymicrobial, POA  PAD s/p prior angioplasty to RT LE  -Follows w/ Dr. Joseph (podiatry) opt; on PO doxy PTA  -Arterial duplex strongly suggestive of PAD; no revasc per sgy/vasc team  - Tissue Cx w/ Enterococcus faecalis/casseliflavus  -S/p LT first toe amputation 8/2 w/ Dr. Márquez  -Lost PICC line 8/18; no replacement 2/2 renal disease  -S/p IV Unasyn, now on oral Augmentin due to loss of IV access, planned through 9/13/22 (x6-week postoperative)    Bilateral LE DVTs  -Continue Eliquis    Fall w/ anterior LT shoulder dislocation, reduced in ED  -Orthopedics (Dr. Gipson) has seen, recommend nonoperative management, sling for comfort  -Per Ortho: Shoulder ROM as tolerated, avoid abduction+ external rotation, continue PT/OT, follow-up outpatient  - d/w ortho 8/24 - may be out of sling with shoulder ROM as tolerated, follow up in the clinic 2-3 weeks post DC    Acute urinary retention  -cont bethanechol, Flomax  - FC placed 8/18 per renal  -Will attempt voiding trial prior to d/c.       DM type 2, A1c 9.0%, with long-term use of insulin  -Basal bolus insulin, Accu-Cheks w/ SSI    Hypertension  CAD s/p prior CABG  SSS s/p PPM  - Next device check 12/28/22  -cont Toprol-XL, amlodipine, aspirin  - ACEi on hold 2/2 renal disease    Hypothyroidism  -Levothyroxine    Constipation w/ N/V  -Improved Continue bowel regimen    [facility]  Expected Discharge Date:  8/26    DVT prophylaxis:  Medical and mechanical DVT prophylaxis orders are present.     AM-PAC 6 Clicks Score (PT): 13 (08/25/22 1503)    CODE STATUS:   Code Status and Medical Interventions:   Ordered at: 07/28/22 0000     Code Status (Patient has no pulse and is not breathing):    CPR (Attempt to Resuscitate)     Medical Interventions (Patient has pulse or is breathing):    Full Support       Catrina Rogers MD  08/25/22

## 2022-08-26 VITALS
SYSTOLIC BLOOD PRESSURE: 116 MMHG | WEIGHT: 242 LBS | DIASTOLIC BLOOD PRESSURE: 72 MMHG | RESPIRATION RATE: 18 BRPM | HEART RATE: 71 BPM | BODY MASS INDEX: 30.09 KG/M2 | OXYGEN SATURATION: 95 % | HEIGHT: 75 IN | TEMPERATURE: 97.6 F

## 2022-08-26 PROBLEM — M86.272 SUBACUTE OSTEOMYELITIS OF LEFT FOOT: Status: ACTIVE | Noted: 2022-07-27

## 2022-08-26 PROBLEM — I49.5 SSS (SICK SINUS SYNDROME): Chronic | Status: ACTIVE | Noted: 2022-04-29

## 2022-08-26 PROBLEM — Z95.1 S/P CABG X 3: Chronic | Status: ACTIVE | Noted: 2019-03-22

## 2022-08-26 PROBLEM — S43.005A DISLOCATION OF LEFT SHOULDER JOINT, INITIAL ENCOUNTER: Status: RESOLVED | Noted: 2022-07-27 | Resolved: 2022-08-26

## 2022-08-26 PROBLEM — J96.01 ACUTE RESPIRATORY FAILURE WITH HYPOXIA: Status: RESOLVED | Noted: 2022-08-15 | Resolved: 2022-08-26
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
## 2022-08-26 PROBLEM — E11.9 TYPE 2 DIABETES MELLITUS: Chronic | Status: ACTIVE | Noted: 2019-03-22

## 2022-08-26 PROBLEM — E03.9 HYPOTHYROIDISM (ACQUIRED): Chronic | Status: ACTIVE | Noted: 2019-03-22

## 2022-08-26 PROBLEM — I50.21 ACUTE SYSTOLIC HEART FAILURE: Status: RESOLVED | Noted: 2022-08-15 | Resolved: 2022-08-26

## 2022-08-26 PROBLEM — I10 HYPERTENSION: Chronic | Status: ACTIVE | Noted: 2019-03-22

## 2022-08-26 PROBLEM — E78.5 HYPERLIPIDEMIA: Chronic | Status: ACTIVE | Noted: 2019-03-22

## 2022-08-26 LAB
ALBUMIN SERPL-MCNC: 3.3 G/DL (ref 3.5–5.2)
ALBUMIN/GLOB SERPL: 1.3 G/DL
ALP SERPL-CCNC: 61 U/L (ref 39–117)
ALT SERPL W P-5'-P-CCNC: 12 U/L (ref 1–41)
ANION GAP SERPL CALCULATED.3IONS-SCNC: 9 MMOL/L (ref 5–15)
AST SERPL-CCNC: 12 U/L (ref 1–40)
BILIRUB SERPL-MCNC: 0.3 MG/DL (ref 0–1.2)
BUN SERPL-MCNC: 38 MG/DL (ref 8–23)
BUN/CREAT SERPL: 23.9 (ref 7–25)
CALCIUM SPEC-SCNC: 8.3 MG/DL (ref 8.6–10.5)
CHLORIDE SERPL-SCNC: 100 MMOL/L (ref 98–107)
CO2 SERPL-SCNC: 31 MMOL/L (ref 22–29)
CREAT SERPL-MCNC: 1.59 MG/DL (ref 0.76–1.27)
D-LACTATE SERPL-SCNC: 1.9 MMOL/L (ref 0.5–2)
EGFRCR SERPLBLD CKD-EPI 2021: 44.4 ML/MIN/1.73
GBM AB SER IA-ACNC: <0.2 UNITS (ref 0–0.9)
GLOBULIN UR ELPH-MCNC: 2.5 GM/DL
GLUCOSE BLDC GLUCOMTR-MCNC: 135 MG/DL (ref 70–130)
GLUCOSE BLDC GLUCOMTR-MCNC: 206 MG/DL (ref 70–130)
GLUCOSE BLDC GLUCOMTR-MCNC: 250 MG/DL (ref 70–130)
GLUCOSE SERPL-MCNC: 108 MG/DL (ref 65–99)
POTASSIUM SERPL-SCNC: 4.4 MMOL/L (ref 3.5–5.2)
PROT SERPL-MCNC: 5.8 G/DL (ref 6–8.5)
SODIUM SERPL-SCNC: 140 MMOL/L (ref 136–145)

## 2022-08-26 PROCEDURE — 63710000001 INSULIN LISPRO (HUMAN) PER 5 UNITS: Performed by: INTERNAL MEDICINE

## 2022-08-26 PROCEDURE — 82962 GLUCOSE BLOOD TEST: CPT

## 2022-08-26 PROCEDURE — 99024 POSTOP FOLLOW-UP VISIT: CPT | Performed by: PHYSICIAN ASSISTANT

## 2022-08-26 PROCEDURE — 63710000001 INSULIN LISPRO (HUMAN) PER 5 UNITS: Performed by: FAMILY MEDICINE

## 2022-08-26 PROCEDURE — 99239 HOSP IP/OBS DSCHRG MGMT >30: CPT | Performed by: PEDIATRICS

## 2022-08-26 RX ORDER — BETHANECHOL CHLORIDE 5 MG
10 TABLET ORAL 3 TIMES DAILY
Qty: 180 TABLET | Refills: 0 | Status: ON HOLD | OUTPATIENT
Start: 2022-08-26 | End: 2022-10-03

## 2022-08-26 RX ORDER — POLYETHYLENE GLYCOL 3350 17 G/17G
17 POWDER, FOR SOLUTION ORAL DAILY
Qty: 510 G | Refills: 0 | Status: ON HOLD | OUTPATIENT
Start: 2022-08-27 | End: 2022-10-02

## 2022-08-26 RX ORDER — HYDROCODONE BITARTRATE AND ACETAMINOPHEN 5; 325 MG/1; MG/1
1 TABLET ORAL EVERY 4 HOURS PRN
Qty: 10 TABLET | Refills: 0 | Status: ON HOLD | OUTPATIENT
Start: 2022-08-26 | End: 2022-10-02

## 2022-08-26 RX ORDER — IPRATROPIUM BROMIDE AND ALBUTEROL SULFATE 2.5; .5 MG/3ML; MG/3ML
3 SOLUTION RESPIRATORY (INHALATION) EVERY 4 HOURS PRN
Qty: 360 ML | Refills: 0 | Status: ON HOLD | OUTPATIENT
Start: 2022-08-26 | End: 2022-10-03

## 2022-08-26 RX ORDER — TAMSULOSIN HYDROCHLORIDE 0.4 MG/1
0.4 CAPSULE ORAL DAILY
Qty: 30 CAPSULE | Refills: 0 | Status: SHIPPED | OUTPATIENT
Start: 2022-08-27 | End: 2022-12-01 | Stop reason: SDUPTHER

## 2022-08-26 RX ORDER — AMOXICILLIN AND CLAVULANATE POTASSIUM 500; 125 MG/1; MG/1
1 TABLET, FILM COATED ORAL EVERY 12 HOURS SCHEDULED
Qty: 37 TABLET | Refills: 0 | Status: SHIPPED | OUTPATIENT
Start: 2022-08-26 | End: 2022-09-14

## 2022-08-26 RX ORDER — ONDANSETRON 4 MG/1
4 TABLET, FILM COATED ORAL EVERY 6 HOURS PRN
Qty: 10 TABLET | Refills: 0 | Status: ON HOLD | OUTPATIENT
Start: 2022-08-26 | End: 2022-10-02

## 2022-08-26 RX ORDER — BUMETANIDE 2 MG/1
2 TABLET ORAL 2 TIMES DAILY
Qty: 60 TABLET | Refills: 0 | Status: ON HOLD | OUTPATIENT
Start: 2022-08-26 | End: 2022-10-03

## 2022-08-26 RX ADMIN — INSULIN LISPRO 5 UNITS: 100 INJECTION, SOLUTION INTRAVENOUS; SUBCUTANEOUS at 09:24

## 2022-08-26 RX ADMIN — APIXABAN 5 MG: 5 TABLET, FILM COATED ORAL at 09:22

## 2022-08-26 RX ADMIN — BUMETANIDE 2 MG: 1 TABLET ORAL at 09:22

## 2022-08-26 RX ADMIN — CETIRIZINE HYDROCHLORIDE 5 MG: 10 TABLET, FILM COATED ORAL at 09:22

## 2022-08-26 RX ADMIN — TAMSULOSIN HYDROCHLORIDE 0.4 MG: 0.4 CAPSULE ORAL at 09:22

## 2022-08-26 RX ADMIN — ACETAMINOPHEN 650 MG: 325 TABLET, FILM COATED ORAL at 10:31

## 2022-08-26 RX ADMIN — Medication 10 ML: at 09:26

## 2022-08-26 RX ADMIN — Medication 5000 UNITS: at 09:22

## 2022-08-26 RX ADMIN — INSULIN LISPRO 4 UNITS: 100 INJECTION, SOLUTION INTRAVENOUS; SUBCUTANEOUS at 09:25

## 2022-08-26 RX ADMIN — BETHANECHOL CHLORIDE 10 MG: 10 TABLET ORAL at 09:22

## 2022-08-26 RX ADMIN — LEVOTHYROXINE SODIUM 50 MCG: 50 TABLET ORAL at 06:11

## 2022-08-26 RX ADMIN — AMOXICILLIN AND CLAVULANATE POTASSIUM 500 MG: 500; 125 TABLET, FILM COATED ORAL at 09:22

## 2022-08-26 RX ADMIN — ASPIRIN 81 MG CHEWABLE TABLET 81 MG: 81 TABLET CHEWABLE at 09:22

## 2022-08-26 NOTE — PLAN OF CARE
"Temperature low at the start of the shift (93.3F), warm blankets ineffective, bear hugger applied, effective. Reported low temperature to Jovanni WINCHESTER, labs ordered see results, bear florenciogger removed this AM (at approx 0100) pt able to maintain a normal body temperature. Refused HS levemir, pt voiced concern over recent low glucose from this AM, HS glucose was 131 pt stated \"131 is too low for me,\" snack given, levemir held reported to Phillip WINCHESTER. Fonseca in place. Paced on tele. 2L of O2 while sleeping. Prn breathing treatment given x1 this shift, expiratory wheezing noted, occasionally coughing up sputum, also reported to Phillip WINCHESTER, will continue to monitor. Plan to d/c to [facility] via [facility] at 1400 8-26-22. Wife at bedside. Willi Gipson, ID, cards, nephro, and hospitalist following.        Goal Outcome Evaluation:                 "

## 2022-08-26 NOTE — DISCHARGE INSTRUCTIONS
For wound care:  Daily dressing changes, instructions provided for discharge  Instructed patient to wear waffle boots at all times while in bed.  Toe unloading shoe when out of bed  Antibiotics per infectious disease.

## 2022-08-26 NOTE — PROGRESS NOTES
Cardiothoracic Surgery Progress Note      POD # 25 -left great toe transmetatarsal amputation primary closure     LOS: 30 days      Subjective:   Sitting in bed, family at bedside.     Objective:  Vital Signs vital signs below noted T-max past 24 hours 97.8 °F  Temp:  [93.3 °F (34.1 °C)-98.2 °F (36.8 °C)] 98.2 °F (36.8 °C)  Heart Rate:  [62-71] 69  Resp:  [18-20] 18  BP: ()/(60-79) 107/73    Physical Exam:   General Appearance: .  Alert and oriented   Lungs: Clear to auscultation   Heart: Regular rate and rhythm   Skin: wound is well healing, skin margins intact        Results:  Results from last 7 days   Lab Units 08/25/22  2214   WBC 10*3/mm3 8.33   HEMOGLOBIN g/dL 9.7*   HEMATOCRIT % 30.3*   PLATELETS 10*3/mm3 173     Results from last 7 days   Lab Units 08/25/22  2214   SODIUM mmol/L 140   POTASSIUM mmol/L 4.4   CHLORIDE mmol/L 100   CO2 mmol/L 31.0*   BUN mg/dL 38*   CREATININE mg/dL 1.59*   GLUCOSE mg/dL 108*   CALCIUM mg/dL 8.3*         Assessment: #1.  Postop day 25 left great toe transmetatarsal amputation primary closure overall healing well with some ischemia at the proximal third  To expose middle phalangeal joint space with exposed bone secondary to hammertoe phenomena and chronic diabetic ulceration  3 status post remote coronary bypass grafting  4 status post remote pacemaker placement for sick sinus syndrome  5.  Recent fall with rib shoulder dislocation reduced in the emergency department on this admission  6.  Endovascular angioplasty stenting right lower extremity per Dr. Hernandez 2019        Plan:   Daily dressing changes, instructions provided for discharge  Instructed patient to wear waffle boots at all times while in bed.  Toe unloading shoe when out of bed  Antibiotics per infectious disease.    Medical manage per hospitalist/cardiologist.   Cleared for discharge from a surgical perspective per primary service.  Patient awaiting transfer to [facility] today    Simran Lyman PA-C -  08/26/22 - 08:26 EDT

## 2022-08-26 NOTE — PROGRESS NOTES
"   LOS: 30 days    Patient Care Team:  Farooq Painter MD as PCP - General (Family Medicine)  Jermaine Hernandez MD as Consulting Physician (Cardiology)      Subjective     Interval History:     No acute events overnight. No new complaints   Feeling better       Review of Systems:   No CP or SOA , fever, chills, rigors, rash, N/V, Constipation.       Objective     Vital Sign Min/Max for last 24 hours  Temp  Min: 93.3 °F (34.1 °C)  Max: 98.2 °F (36.8 °C)   BP  Min: 97/70  Max: 121/78   Pulse  Min: 62  Max: 71   Resp  Min: 18  Max: 20   SpO2  Min: 92 %  Max: 97 %   Flow (L/min)  Min: 2  Max: 2   No data recorded     Flowsheet Rows    Flowsheet Row First Filed Value   Admission Height 190.5 cm (75\") Documented at 07/27/2022 1848   Admission Weight 98 kg (216 lb) Documented at 07/27/2022 1848          No intake/output data recorded.  I/O last 3 completed shifts:  In: 240 [P.O.:240]  Out: 1250 [Urine:1250]    Physical Exam:    Gen: Alert, NAD   HENT: NC, AT, EOMI   NECK: Supple, no JVD, Trachea midline   LUNGS: CTA bilaterally, non labored respirtation   CVS: S1/S2 audible, RRR, no murmur   Abd: Soft, NT, ND, BS+   Ext: + pedal edema, no cyanosis   CNS: Alert, No focal deficit noted grossly  Psy: Cooperative  Skin: Warm, dry and intact      WBC WBC   Date Value Ref Range Status   08/25/2022 8.33 3.40 - 10.80 10*3/mm3 Final      HGB Hemoglobin   Date Value Ref Range Status   08/25/2022 9.7 (L) 13.0 - 17.7 g/dL Final      HCT Hematocrit   Date Value Ref Range Status   08/25/2022 30.3 (L) 37.5 - 51.0 % Final      Platlets No results found for: LABPLAT   MCV MCV   Date Value Ref Range Status   08/25/2022 91.3 79.0 - 97.0 fL Final          Sodium Sodium   Date Value Ref Range Status   08/25/2022 140 136 - 145 mmol/L Final   08/25/2022 137 136 - 145 mmol/L Final   08/23/2022 140 136 - 145 mmol/L Final      Potassium Potassium   Date Value Ref Range Status   08/25/2022 4.4 3.5 - 5.2 mmol/L Final   08/25/2022 4.0 3.5 - 5.2 " mmol/L Final   08/23/2022 5.0 3.5 - 5.2 mmol/L Final     Comment:     Slight hemolysis detected by analyzer. Results may be affected.      Chloride Chloride   Date Value Ref Range Status   08/25/2022 100 98 - 107 mmol/L Final   08/25/2022 99 98 - 107 mmol/L Final   08/23/2022 100 98 - 107 mmol/L Final      CO2 CO2   Date Value Ref Range Status   08/25/2022 31.0 (H) 22.0 - 29.0 mmol/L Final   08/25/2022 29.0 22.0 - 29.0 mmol/L Final   08/23/2022 30.0 (H) 22.0 - 29.0 mmol/L Final      BUN BUN   Date Value Ref Range Status   08/25/2022 38 (H) 8 - 23 mg/dL Final   08/25/2022 41 (H) 8 - 23 mg/dL Final   08/23/2022 51 (H) 8 - 23 mg/dL Final      Creatinine Creatinine   Date Value Ref Range Status   08/25/2022 1.59 (H) 0.76 - 1.27 mg/dL Final   08/25/2022 1.65 (H) 0.76 - 1.27 mg/dL Final   08/23/2022 1.84 (H) 0.76 - 1.27 mg/dL Final      Calcium Calcium   Date Value Ref Range Status   08/25/2022 8.3 (L) 8.6 - 10.5 mg/dL Final   08/25/2022 8.2 (L) 8.6 - 10.5 mg/dL Final   08/23/2022 8.4 (L) 8.6 - 10.5 mg/dL Final      PO4 No results found for: CAPO4   Albumin Albumin   Date Value Ref Range Status   08/25/2022 3.30 (L) 3.50 - 5.20 g/dL Final   08/25/2022 3.00 (L) 3.50 - 5.20 g/dL Final   08/23/2022 3.10 (L) 3.50 - 5.20 g/dL Final      Magnesium No results found for: MG   Uric Acid No results found for: URICACID        Results Review:     I reviewed the patient's new clinical results.    amLODIPine, 10 mg, Oral, Daily  amoxicillin-clavulanate, 1 tablet, Oral, Q12H  apixaban, 5 mg, Oral, Q12H  aspirin, 81 mg, Oral, Daily  bethanechol, 10 mg, Oral, TID  bumetanide, 2 mg, Oral, BID  cetirizine, 5 mg, Oral, Daily  insulin detemir, 10 Units, Subcutaneous, Nightly  insulin lispro, 0-9 Units, Subcutaneous, TID AC  Insulin Lispro, 5 Units, Subcutaneous, TID With Meals  levothyroxine, 50 mcg, Oral, Q AM  metoprolol succinate XL, 50 mg, Oral, Q24H  polyethylene glycol, 17 g, Oral, Daily  senna-docusate sodium, 2 tablet, Oral,  BID  sodium chloride, 10 mL, Intravenous, Q12H  tamsulosin, 0.4 mg, Oral, Daily  vitamin D3, 5,000 Units, Oral, Daily           Medication Review:     Assessment & Plan       Acute renal failure (ARF) (HCC)    Type 2 diabetes mellitus (HCC)    Hypertension    Hyperlipidemia    Hypothyroidism (acquired)    S/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 Au    SSS (sick sinus syndrome) (HCC)    Dislocation of left shoulder joint, initial encounter    Cellulitis in diabetic foot (HCC)    Fall    Proximal phalanx fracture of the second digit extending into the second metatarsal joint    Acute systolic heart failure (HCC)    DVT, bilateral lower limbs (HCC)    Acute respiratory failure with hypoxia (HCC)    1- [place] - non oliguric -  Pre- renal azotemia/ATN (Hypotension vs vancomycin related) vs AIN - Renal duplex - no WILMA. Improving. UA Microscopic hematuria RBC TNTC. TRISH(-), Complement level normal.  ANCA and Anti GBM pending.   2- HTN   3- Anemia    4- Osteomyelitis - left toe   5- DM   6- CAD s/p CABG 3/22/19 - EF 45- 50%  7- Sick sinus syndrome   8- Proteinuria - UPCR 0.38  9- Pulmonary edema  - improving.     Plan:  - Check labs   - Continue with current regimen.   - Avoid nephrotoxic agents.   - Monitor I/O   - Renal diet.   - Adjust meds per renal function       Semaj Ly MD  08/26/22  09:12 EDT

## 2022-08-26 NOTE — CASE MANAGEMENT/SOCIAL WORK
Case Management Discharge Note      Final Note: Case mgt f/u. Arrangements complete for transfer to an acute level rehab bed today. [facility] ambulance will transport at 1400. Mr Singh and his wife are in agreement with plan. Call report to [phone]         Selected Continued Care - Admitted Since 7/27/2022     Destination Coordination complete.    Service Provider Selected Services Address Phone Fax Patient Preferred    [facility]  Inpatient Rehabilitation [address] [id number] [phone] --          Durable Medical Equipment    No services have been selected for the patient.              Dialysis/Infusion    No services have been selected for the patient.              Home Medical Care    No services have been selected for the patient.              Therapy    No services have been selected for the patient.              Community Resources    No services have been selected for the patient.              Community & DME    No services have been selected for the patient.                  Transportation Services  Ambulance: [facility]    Final Discharge Disposition Code: 62 - inpatient rehab facility

## 2022-08-26 NOTE — PROGRESS NOTES
Jermaine JIMENES Francisco  1945  [id number]    Date of Consult: 7/28/22    Admission Date: 7/27/2022      Requesting Provider: No ref. provider found  Evaluating Physician: Sage Braga MD    Reason for Consultation: Evaluation of toe wound    History of present illness:    Patient is a 77 y.o. male with coronary disease history of CABG diabetes mellitus type 2 hypothyroidism hypertension hyperlipidemia prevascular disease presents the emergency room after having a fall and injuring left shoulder patient tripped while walking his dog.  Coincidentally patient wears an orthotic shoe as recommended by a podiatrist has been on oral doxycycline for a left toe wound we are asked to evaluate this patient to start on broad-spectrum antibiotics occluding vancomycin and Zosyn.  X-ray of left foot showed soft tissue swelling in the forefoot without obvious fracture or osteomyelitis.    Has history of pacemaker    History of vascular stent placed in right leg.    7/29/2022; is having bone scan today denies fevers rash sore throat or diarrhea    7/30/22:  Bone scan compatible with osteomyelitis distal phalanx of first digit, acute fracture 2nd digit.  Afebrile. Blood cultures no growth to date.     7/31/22:  Afebrile.   Dr. Márquez consulting Dr. Apodaca for vascular studies left lower extremity.   No n/v/d.  Complains of shoulder pain.     8/2/22; doing well; had surgery today; toe amputation, no fever, rash, sore throat    8/3/22; no events overnight; resting quietly no events overnight    8/4/22; doing well; no events overnight; no fever, rash, sore throat;   8/5/22; no events overnight; no fever, rash, sore throat, no diarrhea    8/8/22; no events overnight; no fever, rash, sore throat    8/9/22; no events overnight; no fever, rash, sore throat no diarrhea  8/11/22; patient on hfnc, followed by cardiology being worked up for elevated troponin, pobnp.  Resting quietly    8/12/22; on hfnc, getting diuresed, no fever, rahs, sore  throat has nausea, reports chest pain    8/14/22: on 4L O2.  Denies f/c, sore throat, n/v/d, rashes.   8/15/22: on 4.5 L O2.  Still with right upper chest wall pain.  Denies f/c, sob, n/v/d, rashes. Just back from [facility].      8/17/2022 patient's is on 2.5 L of oxygen has no complaints denies fevers rash sore throat family is by bedside    8/19/22 on oxygen by nasal cannula resting comfortably in bed no complaints denies fevers or sore throat    8/22/22; doing well; no events overnight; no fever, rash, sore throat off o2 by nasal cannula    8/23/22 no events overnight; no fever, rash, sore throat; of o2; feels well; no complaints    8/26/2022 doing well ongoing cardiopulmonary no fevers rash or sore throat  Past Medical History:   Diagnosis Date   • Asthma    • Coronary artery disease    • Diabetes mellitus (HCC) 2000    started on inuslin 12/2018; started on po meds in 2000; checking blood sugars daily    • Disease of thyroid gland     po meds daily for hypothyroidism    • History of fracture as a child     rt leg- severe    • Hyperlipidemia    • Hypertension    • Hypothyroidism    • Peripheral neuropathy    • Peripheral vascular disease (HCC)     s/p angiogram 2/19-needs stent in left leg    • RBBB    • Right knee pain    • Vitamin D deficiency        Past Surgical History:   Procedure Laterality Date   • APPENDECTOMY     • CARDIAC CATHETERIZATION N/A 2/14/2019    Procedure: Left Heart Cath;  Surgeon: Cooper Apodaca MD;  Location:  [facility] CATH INVASIVE LOCATION;  Service: Cardiology   • CARDIAC ELECTROPHYSIOLOGY PROCEDURE N/A 5/18/2022    Procedure: DEVICE IMPLANT;  Surgeon: Cooper Apodaca MD;  Location:  [facility] CATH INVASIVE LOCATION;  Service: Cardiology;  Laterality: N/A;   • COLONOSCOPY     • CORONARY ARTERY BYPASS GRAFT N/A 3/22/2019    Procedure: MEDIAN STERNOTOMY, CORONARY ARTERY BYPASS GRAFT X3, UTILIZING THE LEFT INTERNAL MAMMARY ARTERY, EVH AND OPEN HARVEST OF THE RIGHT GREATER  SAPHENOUS VEIN, EXPLORATION OF THE LEFT LEG;  Surgeon: Ap Au MD;  Location:  [facility] OR;  Service: Cardiothoracic   • EYE SURGERY Bilateral     cataracts    • INTERVENTIONAL RADIOLOGY PROCEDURE N/A 7/29/2021    Procedure: Abdominal Aortagram with Runoff;  Surgeon: Jermaine Hernandez MD;  Location:  [facility] CATH INVASIVE LOCATION;  Service: Cardiovascular;  Laterality: N/A;   • KNEE ARTHROSCOPY      right x 2, left x 1   • LACERATION REPAIR      right leg   • LEG SURGERY      2 for fracture of rt leg    • TONSILLECTOMY      Adnoidectomy   • TRANS METATARSAL AMPUTATION Left 8/2/2022    Procedure: GREAT TOE AMPUTATION LEFT;  Surgeon: Gopal Márquez MD;  Location:  [facility];  Service: Vascular;  Laterality: Left;       Family History   Problem Relation Age of Onset   • Coronary artery disease Mother    • Diabetes Mother    • Cancer Father        Social History     Socioeconomic History   • Marital status:    • Number of children: 2   Tobacco Use   • Smoking status: Never Smoker   • Smokeless tobacco: Never Used   Substance and Sexual Activity   • Alcohol use: No   • Drug use: No   • Sexual activity: Defer       No Known Allergies      Medication:    Current Facility-Administered Medications:   •  acetaminophen (TYLENOL) tablet 650 mg, 650 mg, Oral, Q4H PRN, 650 mg at 08/26/22 1031 **OR** acetaminophen (TYLENOL) 160 MG/5ML solution 650 mg, 650 mg, Oral, Q4H PRN, 649.6 mg at 08/24/22 1746 **OR** acetaminophen (TYLENOL) suppository 650 mg, 650 mg, Rectal, Q4H PRN, Nishant Allan PA  •  amLODIPine (NORVASC) tablet 10 mg, 10 mg, Oral, Daily, Nishant Allan PA, 10 mg at 08/24/22 0816  •  amoxicillin-clavulanate (AUGMENTIN) 500-125 MG per tablet 500 mg, 1 tablet, Oral, Q12H, Clifford Tavares [facility], 500 mg at 08/26/22 0922  •  [COMPLETED] apixaban (ELIQUIS) tablet 10 mg, 10 mg, Oral, Q12H, 10 mg at 08/20/22 2156 **FOLLOWED BY** apixaban (ELIQUIS) tablet 5 mg, 5 mg, Oral, Q12H, Reilly Thomas MD, 5 mg at  08/26/22 0922  •  aspirin chewable tablet 81 mg, 81 mg, Oral, Daily, Nishant Allan PA, 81 mg at 08/26/22 0922  •  benzocaine-menthol (CEPACOL) lozenge 1 lozenge, 1 lozenge, Mouth/Throat, Q2H PRN, Luz Elena Batista DO, 1 lozenge at 08/18/22 1811  •  bethanechol (URECHOLINE) tablet 10 mg, 10 mg, Oral, TID, Luz Elena Batista DO, 10 mg at 08/26/22 0922  •  bisacodyl (DULCOLAX) suppository 10 mg, 10 mg, Rectal, Daily PRN, Nishant Allan PA, 10 mg at 08/12/22 2041  •  bumetanide (BUMEX) tablet 2 mg, 2 mg, Oral, BID, MalachiSemaj MD, 2 mg at 08/26/22 0922  •  calcium carbonate (TUMS) chewable tablet 500 mg (200 mg elemental), 2 tablet, Oral, TID PRN, Hayedr Vogel MD, 2 tablet at 08/17/22 1642  •  cetirizine (zyrTEC) tablet 5 mg, 5 mg, Oral, Daily, Nishant Allan PA, 5 mg at 08/26/22 0922  •  dextrose (D50W) (25 g/50 mL) IV injection 25 g, 25 g, Intravenous, Q15 Min PRN, Nishant Allan PA, 25 g at 08/25/22 0756  •  dextrose (GLUTOSE) oral gel 15 g, 15 g, Oral, Q15 Min PRN, Nishant Allan PA, 15 g at 08/18/22 0526  •  Diclofenac Sodium (VOLTAREN) 1 % gel 2 g, 2 g, Topical, 4x Daily PRN, Navneet Venegas DO, 2 g at 08/24/22 2145  •  glucagon (human recombinant) (GLUCAGEN DIAGNOSTIC) injection 1 mg, 1 mg, Intramuscular, Q15 Min PRN, Nishant Allan PA  •  HYDROcodone-acetaminophen (NORCO) 5-325 MG per tablet 1 tablet, 1 tablet, Oral, Q4H PRN, Peter Braga, DO, 1 tablet at 08/15/22 0733  •  insulin detemir (LEVEMIR) injection 10 Units, 10 Units, Subcutaneous, Nightly, Catrina Rogers MD  •  Insulin Lispro (humaLOG) injection 0-9 Units, 0-9 Units, Subcutaneous, TID AC, Hayder Vogel MD, 4 Units at 08/26/22 0925  •  Insulin Lispro (humaLOG) injection 5 Units, 5 Units, Subcutaneous, TID With Meals, Luz Elena Batista, DO, 5 Units at 08/26/22 0924  •  ipratropium-albuterol (DUO-NEB) nebulizer solution 3 mL, 3 mL, Nebulization, Q4H PRN, Eliana Hightower, APRN, 3 mL at 08/25/22  2056  •  levothyroxine (SYNTHROID, LEVOTHROID) tablet 50 mcg, 50 mcg, Oral, Q AM, Nishant Allan PA, 50 mcg at 08/26/22 0611  •  metoprolol succinate XL (TOPROL-XL) 24 hr tablet 50 mg, 50 mg, Oral, Q24H, Nishant Allan PA, 50 mg at 08/24/22 0817  •  nitroglycerin (NITROSTAT) SL tablet 0.4 mg, 0.4 mg, Sublingual, Q5 Min PRN, Reilly Thomas MD, 0.4 mg at 08/13/22 2206  •  ondansetron (ZOFRAN) tablet 4 mg, 4 mg, Oral, Q6H PRN **OR** ondansetron (ZOFRAN) injection 4 mg, 4 mg, Intravenous, Q6H PRN, Nishant Allan PA, 4 mg at 08/10/22 1607  •  phenol (CHLORASEPTIC) 1.4 % liquid 1 spray, 1 spray, Mouth/Throat, Q2H PRN, Megan Shukla PA, 1 spray at 08/04/22 1610  •  polyethylene glycol (MIRALAX) packet 17 g, 17 g, Oral, Daily, Rajani Farr DO, 17 g at 08/22/22 0838  •  prochlorperazine (COMPAZINE) injection 5 mg, 5 mg, Intravenous, Q6H PRN, Rajani Farr DO, 5 mg at 08/13/22 1955  •  sennosides-docusate (PERICOLACE) 8.6-50 MG per tablet 2 tablet, 2 tablet, Oral, BID PRN, Nishant Allan PA, 2 tablet at 08/09/22 1402  •  sennosides-docusate (PERICOLACE) 8.6-50 MG per tablet 2 tablet, 2 tablet, Oral, BID, Reilly Thomas MD, 2 tablet at 08/23/22 0845  •  simethicone (MYLICON) chewable tablet 80 mg, 80 mg, Oral, 4x Daily PRN, Daphne Lal MD  •  sodium chloride 0.9 % flush 10 mL, 10 mL, Intravenous, Q12H, Rajani Farr DO, 10 mL at 08/26/22 0926  •  sodium chloride 0.9 % flush 10 mL, 10 mL, Intravenous, PRN, Rajani Farr DO, 10 mL at 08/16/22 1121  •  tamsulosin (FLOMAX) 24 hr capsule 0.4 mg, 0.4 mg, Oral, Daily, Luz Elena Batista DO, 0.4 mg at 08/26/22 0922  •  vitamin D3 capsule 5,000 Units, 5,000 Units, Oral, Daily, Nishant Allan PA, 5,000 Units at 08/26/22 0922    Facility-Administered Medications Ordered in Other Encounters:   •  Chlorhexidine Gluconate Cloth 2 % pads 1 application, 1 application, Topical, Q12H PRN, Mohan Arauz PA    Antibiotics:  Anti-Infectives  (From admission, onward)    Ordered     Dose/Rate Route Frequency Start Stop    22 1140  amoxicillin-clavulanate (AUGMENTIN) 500-125 MG per tablet        Ordering Provider: Catrina Rogers MD    1 tablet Oral Every 12 Hours Scheduled 22 0000 22 2359    22 1219  amoxicillin-clavulanate (AUGMENTIN) 500-125 MG per tablet 500 mg        Ordering Provider: Clifford Tavares RPH    1 tablet Oral Every 12 Hours Scheduled 22 1600 22 2359    22 1550  cefepime (MAXIPIME) 2 g/100 mL 0.9% NS (mbp)        Ordering Provider: Sage Braga MD    2 g  200 mL/hr over 30 Minutes Intravenous Once 22 1645 22 1640    22 1111  vancomycin 1500 mg/500 mL 0.9% NS IVPB ([facility])        Ordering Provider: Nishant Allan PA    15 mg/kg × 98 kg Intravenous Once 22 2200 22 2130    22 1209  vancomycin in dextrose 5% 150 mL (VANCOCIN) IVPB 750 mg        Ordering Provider: Miguel Bray RPH    750 mg Intravenous Every 12 Hours 22 2200 22 0934    22 1209  vancomycin in dextrose 5% 150 mL (VANCOCIN) IVPB 750 mg        Ordering Provider: Miguel Bray RPH    750 mg  over 60 Minutes Intravenous Every 12 Hours 22 1300 22 1422    22 0014  piperacillin-tazobactam (ZOSYN) 3.375 g in iso-osmotic dextrose 50 ml (premix)        Ordering Provider: Eliana Hightower APRN    3.375 g  over 4 Hours Intravenous Every 8 Hours 22 0800 22 0303    22 2208  piperacillin-tazobactam (ZOSYN) 3.375 g in iso-osmotic dextrose 50 ml (premix)        Ordering Provider: Donny Hightower APRN    3.375 g  over 30 Minutes Intravenous Once 22 2210 22 0320    22 2208  vancomycin 2000 mg/500 mL 0.9% NS IVPB ([facility])        Ordering Provider: Donny Hightower APRN    20 mg/kg × 98 kg Intravenous Once 22 22122 0040            Review of Systems:  See HPI      Physical Exam:   Vital Signs  Temp (24hrs), Av.1 °F  (35.6 °C), Min:93.3 °F (34.1 °C), Max:98.2 °F (36.8 °C)    Temp  Min: 93.3 °F (34.1 °C)  Max: 98.2 °F (36.8 °C)  BP  Min: 97/70  Max: 121/78  Pulse  Min: 62  Max: 71  Resp  Min: 18  Max: 20  SpO2  Min: 94 %  Max: 97 %    GENERAL: awake, alert, no distress  HEENT: Normocephalic, atraumatic.  PERRL. EOMI. No conjunctival injection. No icterus.   HEART: Monitor rhythm on telemetry  LUNGS: Clear to auscultation bilaterally   ABDOMEN: Soft, nontender,   :  Without Fonseca catheter.  MSK: Left foot Inspected the amputation site is clean dry and intact surrounding air  SKIN: Warm and dry without cutaneous eruptions on Inspection/palpation.        Laboratory Data    Results from last 7 days   Lab Units 08/25/22  2214 08/21/22  0646 08/20/22  0639   WBC 10*3/mm3 8.33 9.20 9.07   HEMOGLOBIN g/dL 9.7* 10.1* 9.8*   HEMATOCRIT % 30.3* 30.2* 28.8*   PLATELETS 10*3/mm3 173 163 157     Results from last 7 days   Lab Units 08/25/22  2214   SODIUM mmol/L 140   POTASSIUM mmol/L 4.4   CHLORIDE mmol/L 100   CO2 mmol/L 31.0*   BUN mg/dL 38*   CREATININE mg/dL 1.59*   GLUCOSE mg/dL 108*   CALCIUM mg/dL 8.3*     Results from last 7 days   Lab Units 08/25/22  2214   ALK PHOS U/L 61   BILIRUBIN mg/dL 0.3   ALT (SGPT) U/L 12   AST (SGOT) U/L 12             Results from last 7 days   Lab Units 08/25/22  2214   LACTATE mmol/L 1.9             Estimated Creatinine Clearance: 52.1 mL/min (A) (by C-G formula based on SCr of 1.59 mg/dL (H)).      Microbiology:  Microbiology Results (last 10 days)     ** No results found for the last 240 hours. **                Radiology:  Imaging Results (Last 72 Hours)     ** No results found for the last 72 hours. **        Estimated Creatinine Clearance: 52.1 mL/min (A) (by C-G formula based on SCr of 1.59 mg/dL (H)).      Impression:   Leukocytosis with neutrophilia, improved.   Lactic acidosis  Left great toe wound- osteomyelitis by bone scan- amputation recommended by Dr. Márquez   Status post fall  Diabetes  mellitus type 2  Left shoulder dislocation  Probable peripheral vascular disease  Acute renal failure, ongoing.   Elevated proBNP/difficulty diuresis given renal failure.  Right upper chest wall pain, ongoing.     PLAN/RECOMMENDATIONS:     cont unasyn okay to reduce dose to every 12 hours given elevation of creatinine and     Plan was to continue IV antibiotic for 6 weeks from 8/2/22  however PICC line was removed by patient accidentally; given  patient's worsening kidney function and renal failure I would try to avoid a repeat picc line.    D/w family    Given that toe was amputated and I suspect good surgical margins were obtained I am willing to treat with oral Augmentin to complete a 6 week course from 8/2/22 (9/13/22)      Okay to go to [place] [facility] from my standpoint  Complicated case  Sage Braga MD  8/26/2022  13:44 EDT

## 2022-08-26 NOTE — DISCHARGE SUMMARY
[facility] Medicine Services  DISCHARGE SUMMARY    Patient Name: Jermaine Singh  : 1945  MRN: [id number]    Date of Admission: 2022  6:45 PM  Date of Discharge:  2022  Primary Care Physician: Farooq Painter MD    Consults     Date and Time Order Name Status Description    2022 12:25 PM Inpatient Nephrology Consult Completed     2022  8:21 AM Inpatient Cardiology Consult Completed     2022 11:03 AM Inpatient Vascular Surgery Consult Completed     2022 12:34 AM Inpatient Infectious Diseases Consult Completed     2022 12:34 AM Inpatient Orthopedic Surgery Consult Completed           Hospital Course     Presenting Problem:   Dislocation of left shoulder joint, initial encounter [S43.005A]    Active Hospital Problems    Diagnosis  POA   • **Acute renal failure (ARF) ([facility]) [N17.9]  Yes   • DVT, bilateral lower limbs ([facility]) [I82.403]  No   • Proximal phalanx fracture of the second digit extending into the second metatarsal joint [S92.919A]  Yes   • Subacute osteomyelitis of left foot ([facility]) [M86.272]  Yes   • SSS (sick sinus syndrome) ([facility]) [I49.5]  Yes   • Hyperlipidemia [E78.5]  Yes   • Hypertension [I10]  Yes   • S/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 Au [Z95.1]  Not Applicable   • Hypothyroidism (acquired) [E03.9]  Yes   • Type 2 diabetes mellitus ([facility]) [E11.9]  Yes      Resolved Hospital Problems    Diagnosis Date Resolved POA   • Acute systolic heart failure ([facility]) [I50.21] 2022 No   • Acute respiratory failure with hypoxia ([facility]) [J96.01] 2022 No   • Dislocation of left shoulder joint, initial encounter [S43.005A] 2022 Yes   • Fall [W19.XXXA] 2022 Yes   • Sepsis ([facility]) [A41.9] 2022 Yes          Hospital Course:  Jermaine Singh is a 77 y.o. male w/ DM2, CAD s/p prior CABG, PVD s/p RT LE angioplasty 2021 who initially presented to the ED 22 after sustaining a fall while walking his dog, identified to have LT shoulder  dislocation that was reduced in the ED.  At that time he had systemic markers for illness, admitted for concern of osteomyelitis and started on empiric IV antibiotics.  Pt had a bone scan that was compatible with osteomyelitis on 7/30/22.    Underwent LT first toe amputation 8/2 w/ Dr. Márquez.  Tissue culture grew Enterococcus faecalis/casseliflavus, of which he was initially on IV Unasyn but due to loss of IV access was switched to oral Augmentin.  This should continue for a total of 6 weeks with an end date of September 13.      His hospitalization has been prolonged/complicated by acute pulmonary disease with marked hypoxia that was thought to be 2/2 acute renal failure and acute on chronic systolic heart failure exacerbation.    Nephrology and cardiology both were consulted.  Patient clinically improved with diuresis.  He has a history of sick sinus syndrome and had a pacemaker placed as well as a history of prior CABG.  He is on Toprol-XL, amlodipine and aspirin.  ACE inhibitor's have been held due to his renal failure.     In terms of his renal failure, thought to be secondary to prerenal azotemia/ATN from hypotension or vancomycin related.  Upon discharge his creatinine was 1.59.  Would recommend continuing to renally dose his medications with a GFR of 44.    He was also found to have bilateral lower extremity DVTs.  He currently is on Eliquis for these.    Patient also had some acute urinary retention.  Was placed on bethanechol and Flomax.  Had a Fonseca placed on 8/18 and removed prior to discharge.  Would recommend monitoring his urine output and bladder scan as needed., and significant renal dysfunction; he has been followed by ID, nephrology, cardiology with slow but gradual recovery      Discharge Follow Up Recommendations for outpatient labs/diagnostics:  Weekly BMPs to monitor creatinine.    Day of Discharge     HPI:   Patient overall has done well.  Last night had a low temperature to 93 Fahrenheit,  received a bear hugger and then it was removed.  His temperature had been stable throughout the day today.  Clinically he has no complaints.    Review of Systems  Gen- No fevers, chills  CV- No chest pain, palpitations  Resp- No cough, dyspnea  GI- No N/V/D, abd pain        Vital Signs:   Temp:  [93.3 °F (34.1 °C)-98.2 °F (36.8 °C)] 97.6 °F (36.4 °C)  Heart Rate:  [62-71] 71  Resp:  [18-20] 18  BP: ()/(60-78) 116/72  Flow (L/min):  [2] 2      Physical Exam:  Constitutional: Awake, alert, no acute distress, lying in bed  HENT: NCAT, mucous membranes moist  Respiratory: Clear to auscultation bilaterally, respiratory effort normal   Cardiovascular: RRR, no murmurs, rubs, or gallops, palpable radial pulses  Gastrointestinal: Positive bowel sounds, soft, nontender, nondistended  Musculoskeletal: LT foot with postoperative bandaging  Psychiatric: Appropriate affect, cooperative  Neurologic: Speech clear and fluent, moving extremities spontaneously      Pertinent  and/or Most Recent Results     LAB RESULTS:      Lab 08/25/22 2214 08/21/22  0646 08/20/22  0639   WBC 8.33 9.20 9.07   HEMOGLOBIN 9.7* 10.1* 9.8*   HEMATOCRIT 30.3* 30.2* 28.8*   PLATELETS 173 163 157   MCV 91.3 87.3 86.2   LACTATE 1.9  --   --          Lab 08/25/22 2214 08/25/22  0746 08/23/22 2207 08/22/22  0656 08/21/22  0646   SODIUM 140 137 140 138 137   POTASSIUM 4.4 4.0 5.0 4.7 4.3   CHLORIDE 100 99 100 98 96*   CO2 31.0* 29.0 30.0* 32.0* 27.0   ANION GAP 9.0 9.0 10.0 8.0 14.0   BUN 38* 41* 51* 73* 74*   CREATININE 1.59* 1.65* 1.84* 2.64* 2.71*   EGFR 44.4* 42.5* 37.3* 24.2* 23.4*   GLUCOSE 108* 60* 190* 194* 215*   CALCIUM 8.3* 8.2* 8.4* 8.4* 8.1*   PHOSPHORUS  --  3.5 3.7 4.5  --          Lab 08/25/22 2214 08/25/22  0746 08/23/22 2207 08/22/22  0656   TOTAL PROTEIN 5.8*  --   --   --    ALBUMIN 3.30* 3.00* 3.10* 3.00*   GLOBULIN 2.5  --   --   --    ALT (SGPT) 12  --   --   --    AST (SGOT) 12  --   --   --    BILIRUBIN 0.3  --   --   --     ALK PHOS 61  --   --   --                      Brief Urine Lab Results  (Last result in the past 365 days)      Color   Clarity   Blood   Leuk Est   Nitrite   Protein   CREAT   Urine HCG        08/23/22 1309             59.2         08/23/22 1309 [place]   Cloudy   Large (3+)   Trace   Negative   30 mg/dL (1+)               Microbiology Results (last 10 days)     ** No results found for the last 240 hours. **          XR Chest 1 View    Result Date: 8/18/2022  PROCEDURE:   XR CHEST 1 VW HISTORY:   PICC placement COMPARISONS: 8/16/2022 FINDINGS: Poor visualization of the right arm PICC. Right arm PICC appears to show interval retraction now terminating within the right axillary region. Persistent multifocal airspace opacities and interstitial prominence. Similar blunting of the left costophrenic sulcus. No pneumothorax. Stable cardiomegaly.     1. Interval retraction of the right arm PICC now terminates within the right mid axillary region. 2. Persistent edema and/or infection. 3. Persistent small left pleural effusion. Electronically signed by:  John Morocho D.O.  8/18/2022 12:47 AM Mountain Time    XR Chest 1 View    Result Date: 8/16/2022  FRONTAL VIEW OF THE CHEST CLINICAL INDICATION: Dyspnea COMPARISON: August 14, 2022 FINDINGS: Lines: Left chest wall pacemaker. Intracardiac leads appear intact. Right upper extremity PICC with the tip near the superior cavoatrial junction. Interval increase in patchy airspace opacities, right greater than left. Left basilar opacity silhouetting left hemidiaphragm with possible left effusion. Heart size is stable. Osseous structures are unremarkable.     Increasing airspace opacities and left basilar opacity with likely left effusion. Findings may be related to worsening pneumonia or edema.  Support devices as above. Electronically signed by:  Shanel Molina  8/16/2022 4:06 AM Mountain Time    Duplex Renal Artery - Bilateral Complete CAR    Result Date: 8/16/2022  US RENAL  ARTERIES. DUPLEX RENAL ARTERY BILATERAL COMPLETE CAR-  History: Acute kidney injury in the setting of peripheral vascular disease as well as history of CABG, rule out WILMA.  Comparison: None.  Technique: Two-dimensional grayscale, color flow, pulse wave spectral Doppler examination of the right and left renal circulation. The study is not optimized for the examination of the right and left renal parenchyma.  Limitations: Bowel gas and patient's body habitus. The patient was short of breath and unable to cooperate with breath-holding. This also limited the visualization of the renal arteries.  Findings:  Abdominal aorta: AP diameter of the Proximal segment: 1.7 cm. This is normal. Peak systolic velocity in the suprarenal aorta is noted below. The spectral Doppler waveform shows Low resistance waveform. There is spectral broadening.  Renal artery ratios: Right: Within normal limits. Left: Within normal limits.  Right kidney: Limited examination Size: Normal Echogenicity: within normal limits to the extent seen. Other findings: Not applicable.  Left kidney: Limited examination Size: Normal Echogenicity: within normal limits to the extent seen. Other findings: A 1.3 x 1.4 cm cyst is mentioned in the note but no corresponding images are available for my review of the study.  Right renal circulation: Main right renal artery: Peak systolic velocity criteria support absence of hemodynamically significant renal artery stenosis. The resistive index is elevated. Acceleration time at the hilum is borderline above the upper limits of normal.  Right renal vein is patent.  Left renal circulation: Main left renal artery: Peak systolic velocity criteria support absence of hemodynamically significant renal artery stenosis. The resistive index is elevated. Acceleration time at the hilum is normal.  Left renal vein is patent.  Other findings: None.      Impression: No sonographic evidence of clinically significant stenosis of the right  or the left renal artery on this exam.  The resistive indices are elevated bilaterally that in combination with the findings suggest parenchymal renal disease.  Please note that the study is not optimized for evaluation of the right and left renal parenchyma. A separate dedicated ultrasound examination should be performed for that purpose.  This report was finalized on 8/16/2022 1:23 PM by Sinan Dinero MD.      US Renal Bilateral    Result Date: 8/15/2022  DATE OF EXAM: 8/15/2022 12:11 PM  PROCEDURE: US RENAL BILATERAL-  INDICATIONS: Acute kidney injury; S43.005A-Unspecified dislocation of left shoulder joint, initial encounter; L03.116-Cellulitis of left lower limb; Z78.9-Other specified health status  COMPARISON: No comparisons available.  TECHNIQUE: Grayscale and color Doppler ultrasound evaluation of the kidneys and urinary bladder was performed.  FINDINGS: Sonographic grayscale and color Doppler evaluation of the kidneys demonstrates  Right kidney measures 11.6 cm in length, without apparent mass or hydronephrosis. Normal color Doppler flow.  Left kidney measures 11.1 cm in length without apparent mass or hydronephrosis. Normal color Doppler flow.  Unremarkable urinary bladder      Normal sonographic appearance of the kidneys, without evidence of hydronephrosis.  This report was finalized on 8/15/2022 4:21 PM by Jermaine Wang.        Results for orders placed during the hospital encounter of 07/27/22    Duplex Renal Artery - Bilateral Complete CAR    Interpretation Summary  US RENAL ARTERIES. DUPLEX RENAL ARTERY BILATERAL COMPLETE CAR-    History: Acute kidney injury in the setting of peripheral vascular  disease as well as history of CABG, rule out WILMA.    Comparison: None.    Technique: Two-dimensional grayscale, color flow, pulse wave spectral  Doppler examination of the right and left renal circulation. The study  is not optimized for the examination of the right and left  renal  parenchyma.    Limitations: Bowel gas and patient's body habitus. The patient was short  of breath and unable to cooperate with breath-holding. This also limited  the visualization of the renal arteries.    Findings:    Abdominal aorta:  AP diameter of the Proximal segment: 1.7 cm. This is normal.  Peak systolic velocity in the suprarenal aorta is noted below. The  spectral Doppler waveform shows Low resistance waveform. There is  spectral broadening.    Renal artery ratios:  Right: Within normal limits.  Left: Within normal limits.    Right kidney: Limited examination  Size: Normal  Echogenicity: within normal limits to the extent seen.  Other findings: Not applicable.    Left kidney: Limited examination  Size: Normal  Echogenicity: within normal limits to the extent seen.  Other findings: A 1.3 x 1.4 cm cyst is mentioned in the note but no  corresponding images are available for my review of the study.    Right renal circulation:  Main right renal artery:  Peak systolic velocity criteria support absence of hemodynamically  significant renal artery stenosis.  The resistive index is elevated.  Acceleration time at the hilum is borderline above the upper limits of  normal.    Right renal vein is patent.    Left renal circulation:  Main left renal artery:  Peak systolic velocity criteria support absence of hemodynamically  significant renal artery stenosis.  The resistive index is elevated.  Acceleration time at the hilum is normal.    Left renal vein is patent.    Other findings:  None.    Impression  Impression:  No sonographic evidence of clinically significant stenosis of the right  or the left renal artery on this exam.    The resistive indices are elevated bilaterally that in combination with  the findings suggest parenchymal renal disease.    Please note that the study is not optimized for evaluation of the right  and left renal parenchyma. A separate dedicated ultrasound examination  should be performed  for that purpose.    This report was finalized on 8/16/2022 1:23 PM by Sinan Dinero MD.      Results for orders placed during the hospital encounter of 07/27/22    Duplex Renal Artery - Bilateral Complete CAR    Interpretation Summary  US RENAL ARTERIES. DUPLEX RENAL ARTERY BILATERAL COMPLETE CAR-    History: Acute kidney injury in the setting of peripheral vascular  disease as well as history of CABG, rule out WILMA.    Comparison: None.    Technique: Two-dimensional grayscale, color flow, pulse wave spectral  Doppler examination of the right and left renal circulation. The study  is not optimized for the examination of the right and left renal  parenchyma.    Limitations: Bowel gas and patient's body habitus. The patient was short  of breath and unable to cooperate with breath-holding. This also limited  the visualization of the renal arteries.    Findings:    Abdominal aorta:  AP diameter of the Proximal segment: 1.7 cm. This is normal.  Peak systolic velocity in the suprarenal aorta is noted below. The  spectral Doppler waveform shows Low resistance waveform. There is  spectral broadening.    Renal artery ratios:  Right: Within normal limits.  Left: Within normal limits.    Right kidney: Limited examination  Size: Normal  Echogenicity: within normal limits to the extent seen.  Other findings: Not applicable.    Left kidney: Limited examination  Size: Normal  Echogenicity: within normal limits to the extent seen.  Other findings: A 1.3 x 1.4 cm cyst is mentioned in the note but no  corresponding images are available for my review of the study.    Right renal circulation:  Main right renal artery:  Peak systolic velocity criteria support absence of hemodynamically  significant renal artery stenosis.  The resistive index is elevated.  Acceleration time at the hilum is borderline above the upper limits of  normal.    Right renal vein is patent.    Left renal circulation:  Main left renal artery:  Peak systolic  velocity criteria support absence of hemodynamically  significant renal artery stenosis.  The resistive index is elevated.  Acceleration time at the hilum is normal.    Left renal vein is patent.    Other findings:  None.    Impression  Impression:  No sonographic evidence of clinically significant stenosis of the right  or the left renal artery on this exam.    The resistive indices are elevated bilaterally that in combination with  the findings suggest parenchymal renal disease.    Please note that the study is not optimized for evaluation of the right  and left renal parenchyma. A separate dedicated ultrasound examination  should be performed for that purpose.    This report was finalized on 8/16/2022 1:23 PM by Sinan Dinero MD.      Results for orders placed during the hospital encounter of 07/27/22    Adult Transthoracic Echo Complete W/ Cont if Necessary Per Protocol    Interpretation Summary  · Left ventricular ejection fraction appears to be 46 - 50%. Left ventricular systolic function is low normal.  · Left ventricular septal hypokinesis  · No significant valvular heart disease      Plan for Follow-up of Pending Labs/Results: F/u with Dr. Ly  Pending Labs     Order Current Status    ANCA Panel In process    Glomerular Basement Membrane Antibodies In process        Discharge Details        Discharge Medications      New Medications      Instructions Start Date   amoxicillin-clavulanate 500-125 MG per tablet  Commonly known as: AUGMENTIN   Take 1 tablet by mouth Every 12 (Twelve) Hours.      apixaban 5 MG tablet tablet  Commonly known as: ELIQUIS   5 mg, Oral, Every 12 Hours Scheduled      bethanechol 10 MG tablet  Commonly known as: URECHOLINE   10 mg, Oral, 3 Times Daily      bumetanide 2 MG tablet  Commonly known as: BUMEX   2 mg, Oral, 2 Times Daily      Diclofenac Sodium 1 % gel gel  Commonly known as: VOLTAREN   2 g, Topical, 4 Times Daily PRN      HYDROcodone-acetaminophen 5-325 MG per  tablet  Commonly known as: NORCO   1 tablet, Oral, Every 4 Hours PRN      insulin detemir 100 UNIT/ML injection  Commonly known as: LEVEMIR  Replaces: insulin detemir 100 UNIT/ML injection   10 Units, Subcutaneous, Nightly      ipratropium-albuterol 0.5-2.5 mg/3 ml nebulizer  Commonly known as: DUO-NEB   3 mL, Nebulization, Every 4 Hours PRN      ondansetron 4 MG tablet  Commonly known as: ZOFRAN   4 mg, Oral, Every 6 Hours PRN      polyethylene glycol 17 g packet  Commonly known as: MIRALAX   17 g, Oral, Daily   Start Date: August 27, 2022     tamsulosin 0.4 MG capsule 24 hr capsule  Commonly known as: FLOMAX   0.4 mg, Oral, Daily   Start Date: August 27, 2022        Changes to Medications      Instructions Start Date   amLODIPine 10 MG tablet  Commonly known as: NORVASC  What changed:   medication strength  how much to take  when to take this   10 mg, Oral, Daily         Continue These Medications      Instructions Start Date   aspirin 81 MG chewable tablet   81 mg, Oral, Daily      cetirizine 10 MG tablet  Commonly known as: zyrTEC   10 mg, Oral, Daily      Dexcom G6  device   See Admin Instructions      Dexcom G6 Sensor   APPLY 1 SENSOR AND CHANGE EVERY 10 DAYS      Dexcom G6 Sensor   Does not apply, Every 10 Days      Dexcom G6 Transmitter misc   ONE TRANSMITTER FOR EVERY 3 MONTHS      insulin lispro 100 UNIT/ML injection  Commonly known as: humaLOG   5 Units, Subcutaneous, 3 Times Daily Before Meals      levothyroxine 50 MCG tablet  Commonly known as: SYNTHROID, LEVOTHROID   50 mcg, Oral, Every Morning      metoprolol succinate XL 50 MG 24 hr tablet  Commonly known as: TOPROL-XL   50 mg, Oral, Daily      MM Pen Needles 32G X 4 MM misc  Generic drug: Insulin Pen Needle   USE 4 PER DAY      ONE TOUCH ULTRA 2 w/Device kit   USE TO CHECK GLUCOSE ONCE DAILY AS DIRECTED      sodium chloride 0.65 % nasal spray   1 spray, Nasal, As Needed      vitamin D3 125 MCG (5000 UT) tablet tablet   5,000 Units, Oral,  Daily         Stop These Medications    insulin detemir 100 UNIT/ML injection  Commonly known as: Levemir FlexTouch  Replaced by: insulin detemir 100 UNIT/ML injection            No Known Allergies      Discharge Disposition:  Rehab Facility or Unit (DC - External)    Diet:  Hospital:  Diet Order   Procedures   • Diet Regular; Consistent Carbohydrate       Activity:  Activity Instructions     Bathing Restrictions      Do not submerge incisions in bathtub, swimming pools, hot tub, or any body of water. You may ONLY shower and allow water to run over incisions, then pat dry.    Type of Restriction: Bathing    Bathing Restrictions: No Tub Bath          Restrictions or Other Recommendations:  As tolerated with PT/OT       CODE STATUS:    Code Status and Medical Interventions:   Ordered at: 07/28/22 0000     Code Status (Patient has no pulse and is not breathing):    CPR (Attempt to Resuscitate)     Medical Interventions (Patient has pulse or is breathing):    Full Support       Future Appointments   Date Time Provider Department Center   8/26/2022  2:00 PM [facility]   9/21/2022  9:15 AM Gopal Márquez MD [facility]       Additional Instructions for the Follow-ups that You Need to Schedule     Discharge Follow-up with Specialty: Follow up with CT Surgery office in 3-4 weeks   As directed      Specialty: Follow up with CT Surgery office in 3-4 weeks                     Catrina Rogers MD  08/26/22      Time Spent on Discharge:  I spent  45  minutes on this discharge activity which included: face-to-face encounter with the patient, reviewing the data in the system, coordination of the care with the nursing staff as well as consultants, documentation, and entering orders.

## 2022-08-30 LAB
C-ANCA TITR SER IF: NORMAL TITER
MYELOPEROXIDASE AB SER IA-ACNC: <0.2 UNITS (ref 0–0.9)
P-ANCA ATYPICAL TITR SER IF: NORMAL TITER
P-ANCA TITR SER IF: NORMAL TITER
PROTEINASE3 AB SER IA-ACNC: <0.2 UNITS (ref 0–0.9)

## 2022-09-15 ENCOUNTER — TELEPHONE (OUTPATIENT)
Dept: CARDIAC SURGERY | Facility: CLINIC | Age: 77
End: 2022-09-15

## 2022-09-15 NOTE — TELEPHONE ENCOUNTER
Umair, PT with HH, called to request verbal orders for PT for patient. OK'd PT one time per week for 4 weeks.

## 2022-09-21 ENCOUNTER — OFFICE VISIT (OUTPATIENT)
Dept: CARDIAC SURGERY | Facility: CLINIC | Age: 77
End: 2022-09-21

## 2022-09-21 VITALS
BODY MASS INDEX: 28.23 KG/M2 | OXYGEN SATURATION: 99 % | TEMPERATURE: 97.7 F | HEART RATE: 73 BPM | HEIGHT: 75 IN | WEIGHT: 227 LBS | SYSTOLIC BLOOD PRESSURE: 130 MMHG | DIASTOLIC BLOOD PRESSURE: 90 MMHG

## 2022-09-21 DIAGNOSIS — I96 GANGRENE: Primary | ICD-10-CM

## 2022-09-21 PROCEDURE — 99024 POSTOP FOLLOW-UP VISIT: CPT | Performed by: THORACIC SURGERY (CARDIOTHORACIC VASCULAR SURGERY)

## 2022-09-21 RX ORDER — FAMOTIDINE 20 MG/1
20 TABLET, FILM COATED ORAL DAILY
Status: ON HOLD | COMMUNITY
End: 2022-10-11 | Stop reason: SDUPTHER

## 2022-09-21 NOTE — PROGRESS NOTES
"Patient Information  Jermaine Singh                                                                                          [address] [place] [address]      1945  [unfilled]  [unfilled]    Chief Complaint   Patient presents with   • Post-op Follow-up     4 week hospital follow-up s/p left great toe amputation 8/2/22- gangrene        History of Present Illness: Patient seen today for follow-up visit left great toe amputation on August 2, 2022.  This was done for diabetic gangrene.    Vitals:    09/21/22 0914   BP: 130/90   BP Location: Right arm   Patient Position: Sitting   Pulse: 73   Temp: 97.7 °F (36.5 °C)   SpO2: 99%   Weight: 103 kg (227 lb)   Height: 190.5 cm (75\")        Physical Exam examination reveals the amputation site to be healing well sutures out Steri-Strips were applied.    Lab/other results:    Assessment: #1.  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  Healing well and sutures out Steri-Strips applied    Plan: Use only dry dressing over the Steri-Strips.  Do not use any Betadine or any wet dressing such as saline.  Only dries 4 x 4's and Kerlix wrap and Spandage tube that dressing.  We will see the patient in 3 weeks for follow-up visit    Gopal Márquez M.D.   "

## 2022-10-02 ENCOUNTER — HOSPITAL ENCOUNTER (INPATIENT)
Facility: HOSPITAL | Age: 77
LOS: 9 days | Discharge: NURSING, REHAB OR LONG-TERM CARE | End: 2022-10-11
Attending: EMERGENCY MEDICINE | Admitting: INTERNAL MEDICINE

## 2022-10-02 ENCOUNTER — APPOINTMENT (OUTPATIENT)
Dept: GENERAL RADIOLOGY | Facility: HOSPITAL | Age: 77
End: 2022-10-02

## 2022-10-02 DIAGNOSIS — L03.115 CELLULITIS OF RIGHT LOWER EXTREMITY: ICD-10-CM

## 2022-10-02 DIAGNOSIS — I20.8 ANGINA AT REST: ICD-10-CM

## 2022-10-02 DIAGNOSIS — I50.20 SYSTOLIC CONGESTIVE HEART FAILURE, UNSPECIFIED HF CHRONICITY: ICD-10-CM

## 2022-10-02 PROBLEM — R77.8 ELEVATED TROPONIN: Status: ACTIVE | Noted: 2022-10-02

## 2022-10-02 PROBLEM — I50.23 ACUTE ON CHRONIC HFREF (HEART FAILURE WITH REDUCED EJECTION FRACTION) (HCC): Status: ACTIVE | Noted: 2022-08-15

## 2022-10-02 PROBLEM — I20.89 ANGINA AT REST: Status: ACTIVE | Noted: 2022-10-02

## 2022-10-02 PROBLEM — I20.89 ANGINA AT REST: Status: RESOLVED | Noted: 2022-10-02 | Resolved: 2022-10-02

## 2022-10-02 PROBLEM — R79.89 ELEVATED TROPONIN: Status: ACTIVE | Noted: 2022-10-02

## 2022-10-02 LAB
ALBUMIN SERPL-MCNC: 3.8 G/DL (ref 3.5–5.2)
ALBUMIN/GLOB SERPL: 1.7 G/DL
ALP SERPL-CCNC: 85 U/L (ref 39–117)
ALT SERPL W P-5'-P-CCNC: 16 U/L (ref 1–41)
ANION GAP SERPL CALCULATED.3IONS-SCNC: 13 MMOL/L (ref 5–15)
AST SERPL-CCNC: 18 U/L (ref 1–40)
BASOPHILS # BLD AUTO: 0.04 10*3/MM3 (ref 0–0.2)
BASOPHILS NFR BLD AUTO: 0.7 % (ref 0–1.5)
BILIRUB SERPL-MCNC: 0.2 MG/DL (ref 0–1.2)
BUN SERPL-MCNC: 33 MG/DL (ref 8–23)
BUN/CREAT SERPL: 15.1 (ref 7–25)
CALCIUM SPEC-SCNC: 9.2 MG/DL (ref 8.6–10.5)
CHLORIDE SERPL-SCNC: 103 MMOL/L (ref 98–107)
CO2 SERPL-SCNC: 24 MMOL/L (ref 22–29)
CREAT SERPL-MCNC: 2.18 MG/DL (ref 0.76–1.27)
CRP SERPL-MCNC: 0.4 MG/DL (ref 0–0.5)
D-LACTATE SERPL-SCNC: 1.9 MMOL/L (ref 0.5–2)
DEPRECATED RDW RBC AUTO: 47.2 FL (ref 37–54)
EGFRCR SERPLBLD CKD-EPI 2021: 30.4 ML/MIN/1.73
EOSINOPHIL # BLD AUTO: 0.11 10*3/MM3 (ref 0–0.4)
EOSINOPHIL NFR BLD AUTO: 2 % (ref 0.3–6.2)
ERYTHROCYTE [DISTWIDTH] IN BLOOD BY AUTOMATED COUNT: 13.8 % (ref 12.3–15.4)
ERYTHROCYTE [SEDIMENTATION RATE] IN BLOOD: 6 MM/HR (ref 0–20)
GLOBULIN UR ELPH-MCNC: 2.3 GM/DL
GLUCOSE BLDC GLUCOMTR-MCNC: 109 MG/DL (ref 70–130)
GLUCOSE BLDC GLUCOMTR-MCNC: 113 MG/DL (ref 70–130)
GLUCOSE BLDC GLUCOMTR-MCNC: 147 MG/DL (ref 70–130)
GLUCOSE SERPL-MCNC: 175 MG/DL (ref 65–99)
HBA1C MFR BLD: 7.2 % (ref 4.8–5.6)
HCT VFR BLD AUTO: 32.4 % (ref 37.5–51)
HGB BLD-MCNC: 10.2 G/DL (ref 13–17.7)
HOLD SPECIMEN: NORMAL
IMM GRANULOCYTES # BLD AUTO: 0.01 10*3/MM3 (ref 0–0.05)
IMM GRANULOCYTES NFR BLD AUTO: 0.2 % (ref 0–0.5)
LYMPHOCYTES # BLD AUTO: 0.98 10*3/MM3 (ref 0.7–3.1)
LYMPHOCYTES NFR BLD AUTO: 17.8 % (ref 19.6–45.3)
MCH RBC QN AUTO: 29.4 PG (ref 26.6–33)
MCHC RBC AUTO-ENTMCNC: 31.5 G/DL (ref 31.5–35.7)
MCV RBC AUTO: 93.4 FL (ref 79–97)
MONOCYTES # BLD AUTO: 0.57 10*3/MM3 (ref 0.1–0.9)
MONOCYTES NFR BLD AUTO: 10.4 % (ref 5–12)
NEUTROPHILS NFR BLD AUTO: 3.79 10*3/MM3 (ref 1.7–7)
NEUTROPHILS NFR BLD AUTO: 68.9 % (ref 42.7–76)
NRBC BLD AUTO-RTO: 0 /100 WBC (ref 0–0.2)
NT-PROBNP SERPL-MCNC: ABNORMAL PG/ML (ref 0–1800)
PLATELET # BLD AUTO: 142 10*3/MM3 (ref 140–450)
PMV BLD AUTO: 13.3 FL (ref 6–12)
POTASSIUM SERPL-SCNC: 4.8 MMOL/L (ref 3.5–5.2)
PROCALCITONIN SERPL-MCNC: 0.08 NG/ML (ref 0–0.25)
PROT SERPL-MCNC: 6.1 G/DL (ref 6–8.5)
QT INTERVAL: 426 MS
QT INTERVAL: 480 MS
QTC INTERVAL: 536 MS
QTC INTERVAL: 538 MS
RBC # BLD AUTO: 3.47 10*6/MM3 (ref 4.14–5.8)
SODIUM SERPL-SCNC: 140 MMOL/L (ref 136–145)
TROPONIN T SERPL-MCNC: 0.11 NG/ML (ref 0–0.03)
TROPONIN T SERPL-MCNC: 0.13 NG/ML (ref 0–0.03)
TROPONIN T SERPL-MCNC: 0.13 NG/ML (ref 0–0.03)
TROPONIN T SERPL-MCNC: 0.14 NG/ML (ref 0–0.03)
TROPONIN T SERPL-MCNC: 0.15 NG/ML (ref 0–0.03)
WBC NRBC COR # BLD: 5.5 10*3/MM3 (ref 3.4–10.8)
WHOLE BLOOD HOLD COAG: NORMAL
WHOLE BLOOD HOLD SPECIMEN: NORMAL

## 2022-10-02 PROCEDURE — 25010000002 ONDANSETRON PER 1 MG: Performed by: INTERNAL MEDICINE

## 2022-10-02 PROCEDURE — 80053 COMPREHEN METABOLIC PANEL: CPT | Performed by: EMERGENCY MEDICINE

## 2022-10-02 PROCEDURE — 84484 ASSAY OF TROPONIN QUANT: CPT | Performed by: INTERNAL MEDICINE

## 2022-10-02 PROCEDURE — 83036 HEMOGLOBIN GLYCOSYLATED A1C: CPT | Performed by: NURSE PRACTITIONER

## 2022-10-02 PROCEDURE — 25010000002 FUROSEMIDE PER 20 MG: Performed by: EMERGENCY MEDICINE

## 2022-10-02 PROCEDURE — 99223 1ST HOSP IP/OBS HIGH 75: CPT | Performed by: INTERNAL MEDICINE

## 2022-10-02 PROCEDURE — 93005 ELECTROCARDIOGRAM TRACING: CPT

## 2022-10-02 PROCEDURE — 84145 PROCALCITONIN (PCT): CPT | Performed by: NURSE PRACTITIONER

## 2022-10-02 PROCEDURE — 93005 ELECTROCARDIOGRAM TRACING: CPT | Performed by: EMERGENCY MEDICINE

## 2022-10-02 PROCEDURE — 84484 ASSAY OF TROPONIN QUANT: CPT | Performed by: EMERGENCY MEDICINE

## 2022-10-02 PROCEDURE — 71045 X-RAY EXAM CHEST 1 VIEW: CPT

## 2022-10-02 PROCEDURE — 25010000002 LORAZEPAM PER 2 MG: Performed by: EMERGENCY MEDICINE

## 2022-10-02 PROCEDURE — 84484 ASSAY OF TROPONIN QUANT: CPT | Performed by: NURSE PRACTITIONER

## 2022-10-02 PROCEDURE — 99222 1ST HOSP IP/OBS MODERATE 55: CPT | Performed by: INTERNAL MEDICINE

## 2022-10-02 PROCEDURE — 36415 COLL VENOUS BLD VENIPUNCTURE: CPT

## 2022-10-02 PROCEDURE — 83880 ASSAY OF NATRIURETIC PEPTIDE: CPT | Performed by: EMERGENCY MEDICINE

## 2022-10-02 PROCEDURE — 85025 COMPLETE CBC W/AUTO DIFF WBC: CPT | Performed by: EMERGENCY MEDICINE

## 2022-10-02 PROCEDURE — 93005 ELECTROCARDIOGRAM TRACING: CPT | Performed by: NURSE PRACTITIONER

## 2022-10-02 PROCEDURE — 87040 BLOOD CULTURE FOR BACTERIA: CPT | Performed by: EMERGENCY MEDICINE

## 2022-10-02 PROCEDURE — 63710000001 INSULIN DETEMIR PER 5 UNITS: Performed by: INTERNAL MEDICINE

## 2022-10-02 PROCEDURE — 99285 EMERGENCY DEPT VISIT HI MDM: CPT

## 2022-10-02 PROCEDURE — 25010000002 PIPERACILLIN SOD-TAZOBACTAM PER 1 G: Performed by: INTERNAL MEDICINE

## 2022-10-02 PROCEDURE — 93010 ELECTROCARDIOGRAM REPORT: CPT | Performed by: INTERNAL MEDICINE

## 2022-10-02 PROCEDURE — 85652 RBC SED RATE AUTOMATED: CPT | Performed by: EMERGENCY MEDICINE

## 2022-10-02 PROCEDURE — 82962 GLUCOSE BLOOD TEST: CPT

## 2022-10-02 PROCEDURE — 25010000002 VANCOMYCIN 10 G RECONSTITUTED SOLUTION

## 2022-10-02 PROCEDURE — 86140 C-REACTIVE PROTEIN: CPT | Performed by: EMERGENCY MEDICINE

## 2022-10-02 PROCEDURE — 25010000002 CEFTRIAXONE PER 250 MG: Performed by: EMERGENCY MEDICINE

## 2022-10-02 PROCEDURE — 83605 ASSAY OF LACTIC ACID: CPT | Performed by: EMERGENCY MEDICINE

## 2022-10-02 PROCEDURE — 84484 ASSAY OF TROPONIN QUANT: CPT

## 2022-10-02 RX ORDER — CHOLECALCIFEROL (VITAMIN D3) 125 MCG
5 CAPSULE ORAL NIGHTLY PRN
Status: DISCONTINUED | OUTPATIENT
Start: 2022-10-02 | End: 2022-10-05

## 2022-10-02 RX ORDER — ACETAMINOPHEN 650 MG/1
650 SUPPOSITORY RECTAL EVERY 4 HOURS PRN
Status: DISCONTINUED | OUTPATIENT
Start: 2022-10-02 | End: 2022-10-11 | Stop reason: HOSPADM

## 2022-10-02 RX ORDER — FAMOTIDINE 20 MG/1
20 TABLET, FILM COATED ORAL DAILY
Status: DISCONTINUED | OUTPATIENT
Start: 2022-10-02 | End: 2022-10-11 | Stop reason: HOSPADM

## 2022-10-02 RX ORDER — ONDANSETRON 4 MG/1
4 TABLET, FILM COATED ORAL EVERY 6 HOURS PRN
Status: DISCONTINUED | OUTPATIENT
Start: 2022-10-02 | End: 2022-10-11 | Stop reason: HOSPADM

## 2022-10-02 RX ORDER — ONDANSETRON 2 MG/ML
4 INJECTION INTRAMUSCULAR; INTRAVENOUS EVERY 6 HOURS PRN
Status: DISCONTINUED | OUTPATIENT
Start: 2022-10-02 | End: 2022-10-11 | Stop reason: HOSPADM

## 2022-10-02 RX ORDER — MAGNESIUM SULFATE 1 G/100ML
1 INJECTION INTRAVENOUS AS NEEDED
Status: DISCONTINUED | OUTPATIENT
Start: 2022-10-02 | End: 2022-10-11 | Stop reason: HOSPADM

## 2022-10-02 RX ORDER — SODIUM CHLORIDE 0.9 % (FLUSH) 0.9 %
10 SYRINGE (ML) INJECTION EVERY 12 HOURS SCHEDULED
Status: DISCONTINUED | OUTPATIENT
Start: 2022-10-02 | End: 2022-10-11 | Stop reason: HOSPADM

## 2022-10-02 RX ORDER — SODIUM CHLORIDE 0.9 % (FLUSH) 0.9 %
10 SYRINGE (ML) INJECTION AS NEEDED
Status: DISCONTINUED | OUTPATIENT
Start: 2022-10-02 | End: 2022-10-11 | Stop reason: HOSPADM

## 2022-10-02 RX ORDER — DEXTROSE MONOHYDRATE 25 G/50ML
25 INJECTION, SOLUTION INTRAVENOUS
Status: DISCONTINUED | OUTPATIENT
Start: 2022-10-02 | End: 2022-10-11 | Stop reason: HOSPADM

## 2022-10-02 RX ORDER — L.ACID,PARA/B.BIFIDUM/S.THERM 8B CELL
1 CAPSULE ORAL DAILY
Status: DISCONTINUED | OUTPATIENT
Start: 2022-10-02 | End: 2022-10-09

## 2022-10-02 RX ORDER — ATORVASTATIN CALCIUM 20 MG/1
20 TABLET, FILM COATED ORAL NIGHTLY
Status: DISCONTINUED | OUTPATIENT
Start: 2022-10-02 | End: 2022-10-11 | Stop reason: HOSPADM

## 2022-10-02 RX ORDER — LEVOTHYROXINE SODIUM 0.07 MG/1
75 TABLET ORAL
Status: DISCONTINUED | OUTPATIENT
Start: 2022-10-02 | End: 2022-10-11 | Stop reason: HOSPADM

## 2022-10-02 RX ORDER — LORAZEPAM 2 MG/ML
0.25 INJECTION INTRAMUSCULAR ONCE
Status: COMPLETED | OUTPATIENT
Start: 2022-10-02 | End: 2022-10-02

## 2022-10-02 RX ORDER — HYDROCODONE BITARTRATE AND ACETAMINOPHEN 5; 325 MG/1; MG/1
1 TABLET ORAL EVERY 4 HOURS PRN
Status: DISCONTINUED | OUTPATIENT
Start: 2022-10-02 | End: 2022-10-11 | Stop reason: HOSPADM

## 2022-10-02 RX ORDER — INSULIN LISPRO 100 [IU]/ML
0-9 INJECTION, SOLUTION INTRAVENOUS; SUBCUTANEOUS
Status: DISCONTINUED | OUTPATIENT
Start: 2022-10-02 | End: 2022-10-11 | Stop reason: HOSPADM

## 2022-10-02 RX ORDER — FUROSEMIDE 10 MG/ML
20 INJECTION INTRAMUSCULAR; INTRAVENOUS ONCE
Status: COMPLETED | OUTPATIENT
Start: 2022-10-02 | End: 2022-10-02

## 2022-10-02 RX ORDER — BISACODYL 10 MG
10 SUPPOSITORY, RECTAL RECTAL DAILY PRN
Status: DISCONTINUED | OUTPATIENT
Start: 2022-10-02 | End: 2022-10-09

## 2022-10-02 RX ORDER — ASPIRIN 81 MG/1
81 TABLET, CHEWABLE ORAL DAILY
Status: DISCONTINUED | OUTPATIENT
Start: 2022-10-02 | End: 2022-10-07

## 2022-10-02 RX ORDER — SULFAMETHOXAZOLE AND TRIMETHOPRIM 800; 160 MG/1; MG/1
1 TABLET ORAL 2 TIMES DAILY
COMMUNITY
Start: 2022-09-27 | End: 2022-10-11 | Stop reason: HOSPADM

## 2022-10-02 RX ORDER — ACETAMINOPHEN 325 MG/1
650 TABLET ORAL EVERY 4 HOURS PRN
Status: DISCONTINUED | OUTPATIENT
Start: 2022-10-02 | End: 2022-10-11 | Stop reason: HOSPADM

## 2022-10-02 RX ORDER — ACETAMINOPHEN 160 MG/5ML
650 SOLUTION ORAL EVERY 4 HOURS PRN
Status: DISCONTINUED | OUTPATIENT
Start: 2022-10-02 | End: 2022-10-11 | Stop reason: HOSPADM

## 2022-10-02 RX ORDER — LINEZOLID 600 MG/1
600 TABLET, FILM COATED ORAL EVERY 12 HOURS SCHEDULED
Status: DISCONTINUED | OUTPATIENT
Start: 2022-10-02 | End: 2022-10-06

## 2022-10-02 RX ORDER — TAMSULOSIN HYDROCHLORIDE 0.4 MG/1
0.4 CAPSULE ORAL DAILY
Status: DISCONTINUED | OUTPATIENT
Start: 2022-10-02 | End: 2022-10-09

## 2022-10-02 RX ORDER — ECHINACEA PURPUREA EXTRACT 125 MG
1 TABLET ORAL AS NEEDED
Status: DISCONTINUED | OUTPATIENT
Start: 2022-10-02 | End: 2022-10-11 | Stop reason: HOSPADM

## 2022-10-02 RX ORDER — POLYETHYLENE GLYCOL 3350 17 G/17G
17 POWDER, FOR SOLUTION ORAL DAILY PRN
Status: DISCONTINUED | OUTPATIENT
Start: 2022-10-02 | End: 2022-10-09

## 2022-10-02 RX ORDER — AMOXICILLIN 250 MG
2 CAPSULE ORAL 2 TIMES DAILY
Status: DISCONTINUED | OUTPATIENT
Start: 2022-10-02 | End: 2022-10-09

## 2022-10-02 RX ORDER — BETHANECHOL CHLORIDE 10 MG/1
10 TABLET ORAL 3 TIMES DAILY
Status: DISCONTINUED | OUTPATIENT
Start: 2022-10-02 | End: 2022-10-11 | Stop reason: HOSPADM

## 2022-10-02 RX ORDER — CARVEDILOL 3.12 MG/1
3.12 TABLET ORAL 2 TIMES DAILY WITH MEALS
Status: DISCONTINUED | OUTPATIENT
Start: 2022-10-02 | End: 2022-10-11 | Stop reason: HOSPADM

## 2022-10-02 RX ORDER — ONDANSETRON 4 MG/1
4 TABLET, FILM COATED ORAL EVERY 6 HOURS PRN
Status: DISCONTINUED | OUTPATIENT
Start: 2022-10-02 | End: 2022-10-02

## 2022-10-02 RX ORDER — BISACODYL 5 MG/1
5 TABLET, DELAYED RELEASE ORAL DAILY PRN
Status: DISCONTINUED | OUTPATIENT
Start: 2022-10-02 | End: 2022-10-09

## 2022-10-02 RX ORDER — BUMETANIDE 0.25 MG/ML
2 INJECTION INTRAMUSCULAR; INTRAVENOUS EVERY 12 HOURS
Status: COMPLETED | OUTPATIENT
Start: 2022-10-02 | End: 2022-10-02

## 2022-10-02 RX ORDER — ONDANSETRON 2 MG/ML
4 INJECTION INTRAMUSCULAR; INTRAVENOUS EVERY 6 HOURS PRN
Status: DISCONTINUED | OUTPATIENT
Start: 2022-10-02 | End: 2022-10-02

## 2022-10-02 RX ORDER — MAGNESIUM SULFATE HEPTAHYDRATE 40 MG/ML
2 INJECTION, SOLUTION INTRAVENOUS AS NEEDED
Status: DISCONTINUED | OUTPATIENT
Start: 2022-10-02 | End: 2022-10-11 | Stop reason: HOSPADM

## 2022-10-02 RX ORDER — IPRATROPIUM BROMIDE AND ALBUTEROL SULFATE 2.5; .5 MG/3ML; MG/3ML
3 SOLUTION RESPIRATORY (INHALATION) EVERY 4 HOURS PRN
Status: DISCONTINUED | OUTPATIENT
Start: 2022-10-02 | End: 2022-10-11 | Stop reason: HOSPADM

## 2022-10-02 RX ORDER — NITROGLYCERIN 0.4 MG/1
0.4 TABLET SUBLINGUAL
Status: DISCONTINUED | OUTPATIENT
Start: 2022-10-02 | End: 2022-10-11 | Stop reason: HOSPADM

## 2022-10-02 RX ORDER — NICOTINE POLACRILEX 4 MG
15 LOZENGE BUCCAL
Status: DISCONTINUED | OUTPATIENT
Start: 2022-10-02 | End: 2022-10-11 | Stop reason: HOSPADM

## 2022-10-02 RX ORDER — FAMOTIDINE 20 MG/1
20 TABLET, FILM COATED ORAL 2 TIMES DAILY
Status: DISCONTINUED | OUTPATIENT
Start: 2022-10-02 | End: 2022-10-02

## 2022-10-02 RX ORDER — MAGNESIUM OXIDE 400 MG/1
400 TABLET ORAL DAILY
COMMUNITY
End: 2022-11-22 | Stop reason: SDUPTHER

## 2022-10-02 RX ADMIN — ONDANSETRON 4 MG: 2 INJECTION INTRAMUSCULAR; INTRAVENOUS at 15:09

## 2022-10-02 RX ADMIN — SENNOSIDES AND DOCUSATE SODIUM 2 TABLET: 50; 8.6 TABLET ORAL at 20:30

## 2022-10-02 RX ADMIN — CARVEDILOL 3.12 MG: 3.12 TABLET, FILM COATED ORAL at 12:01

## 2022-10-02 RX ADMIN — TAZOBACTAM SODIUM AND PIPERACILLIN SODIUM 3.38 G: 375; 3 INJECTION, SOLUTION INTRAVENOUS at 17:36

## 2022-10-02 RX ADMIN — Medication 1 CAPSULE: at 12:01

## 2022-10-02 RX ADMIN — APIXABAN 5 MG: 5 TABLET, FILM COATED ORAL at 12:02

## 2022-10-02 RX ADMIN — BETHANECHOL CHLORIDE 10 MG: 10 TABLET ORAL at 13:17

## 2022-10-02 RX ADMIN — Medication 10 ML: at 20:29

## 2022-10-02 RX ADMIN — LEVOTHYROXINE SODIUM 75 MCG: 0.07 TABLET ORAL at 12:02

## 2022-10-02 RX ADMIN — VANCOMYCIN HYDROCHLORIDE 1750 MG: 10 INJECTION, POWDER, LYOPHILIZED, FOR SOLUTION INTRAVENOUS at 12:00

## 2022-10-02 RX ADMIN — LINEZOLID 600 MG: 600 TABLET, FILM COATED ORAL at 20:30

## 2022-10-02 RX ADMIN — ATORVASTATIN CALCIUM 20 MG: 20 TABLET, FILM COATED ORAL at 20:30

## 2022-10-02 RX ADMIN — LORAZEPAM 0.25 MG: 2 INJECTION INTRAMUSCULAR; INTRAVENOUS at 06:45

## 2022-10-02 RX ADMIN — ASPIRIN 81 MG CHEWABLE TABLET 81 MG: 81 TABLET CHEWABLE at 12:02

## 2022-10-02 RX ADMIN — CEFTRIAXONE 2 G: 2 INJECTION, POWDER, FOR SOLUTION INTRAMUSCULAR; INTRAVENOUS at 04:57

## 2022-10-02 RX ADMIN — SENNOSIDES AND DOCUSATE SODIUM 2 TABLET: 50; 8.6 TABLET ORAL at 12:01

## 2022-10-02 RX ADMIN — FUROSEMIDE 20 MG: 10 INJECTION INTRAMUSCULAR; INTRAVENOUS at 04:58

## 2022-10-02 RX ADMIN — TAZOBACTAM SODIUM AND PIPERACILLIN SODIUM 3.38 G: 375; 3 INJECTION, SOLUTION INTRAVENOUS at 12:01

## 2022-10-02 RX ADMIN — TAMSULOSIN HYDROCHLORIDE 0.4 MG: 0.4 CAPSULE ORAL at 12:03

## 2022-10-02 RX ADMIN — BUMETANIDE 2 MG: 0.25 INJECTION, SOLUTION INTRAMUSCULAR; INTRAVENOUS at 22:25

## 2022-10-02 RX ADMIN — BUMETANIDE 2 MG: 0.25 INJECTION, SOLUTION INTRAMUSCULAR; INTRAVENOUS at 13:16

## 2022-10-02 RX ADMIN — BETHANECHOL CHLORIDE 10 MG: 10 TABLET ORAL at 20:30

## 2022-10-02 RX ADMIN — INSULIN DETEMIR 10 UNITS: 100 INJECTION, SOLUTION SUBCUTANEOUS at 20:30

## 2022-10-02 RX ADMIN — FAMOTIDINE 20 MG: 20 TABLET ORAL at 12:02

## 2022-10-02 NOTE — H&P
"    [facility] Medicine Services  HISTORY AND PHYSICAL    Patient Name: Jermaine Singh  : 1945  MRN: [id number]  Primary Care Physician: Farooq Painter MD  Date of admission: 10/2/2022      Subjective   Subjective     Chief Complaint: SOA    HPI:  Jermaine Singh is a 77 y.o. male presenting to ED \"smothering.\" Patient says this hit him suddenly in the middle of the night and awoke him from sleep. Had some associated chest heaviness at that time. He received a dose of IV lasix 20 mg (significnatly less than his home diuretic dose) and has minimal response to it. He does report worsening LE edema for about 1 week for which his PCP increased his Bumex dose to 2mg once daily. His s/o feels like his LE edema has improved slightly since then.     He also tells me that since about Monday has had notable redness of his RLE. His PCP prescribed him bactrim and he notes no improvement since starting that med. In the ED he was given IV rocephin.     Review of Systems   Gen- No fevers, chills  CV- No chest pain, palpitations  GI- No N/V/D, abd pain    All other systems reviewed and are negative.     Personal History     Past Medical History:   Diagnosis Date   • Asthma    • Coronary artery disease    • Diabetes mellitus (HCC)     started on inuslin 2018; started on po meds in ; checking blood sugars daily    • Disease of thyroid gland     po meds daily for hypothyroidism    • History of fracture as a child     rt leg- severe    • Hyperlipidemia    • Hypertension    • Hypothyroidism    • Peripheral neuropathy    • Peripheral vascular disease (HCC)     s/p angiogram -needs stent in left leg    • RBBB    • Right knee pain    • Vitamin D deficiency              Past Surgical History:   Procedure Laterality Date   • APPENDECTOMY     • CARDIAC CATHETERIZATION N/A 2019    Procedure: Left Heart Cath;  Surgeon: Cooper Apodaca MD;  Location: [facility] CATH INVASIVE LOCATION;  " Service: Cardiology   • CARDIAC ELECTROPHYSIOLOGY PROCEDURE N/A 5/18/2022    Procedure: DEVICE IMPLANT;  Surgeon: Cooper Apodaca MD;  Location:  [facility] CATH INVASIVE LOCATION;  Service: Cardiology;  Laterality: N/A;   • COLONOSCOPY     • CORONARY ARTERY BYPASS GRAFT N/A 3/22/2019    Procedure: MEDIAN STERNOTOMY, CORONARY ARTERY BYPASS GRAFT X3, UTILIZING THE LEFT INTERNAL MAMMARY ARTERY, EVH AND OPEN HARVEST OF THE RIGHT GREATER SAPHENOUS VEIN, EXPLORATION OF THE LEFT LEG;  Surgeon: Ap Au MD;  Location:  [facility] OR;  Service: Cardiothoracic   • EYE SURGERY Bilateral     cataracts    • INTERVENTIONAL RADIOLOGY PROCEDURE N/A 7/29/2021    Procedure: Abdominal Aortagram with Runoff;  Surgeon: Jermaine Hernandez MD;  Location:  [facility] CATH INVASIVE LOCATION;  Service: Cardiovascular;  Laterality: N/A;   • KNEE ARTHROSCOPY      right x 2, left x 1   • LACERATION REPAIR      right leg   • LEG SURGERY      2 for fracture of rt leg    • TONSILLECTOMY      Adnoidectomy   • TRANS METATARSAL AMPUTATION Left 8/2/2022    Procedure: GREAT TOE AMPUTATION LEFT;  Surgeon: Gopal Márquez MD;  Location:  [facility] OR;  Service: Vascular;  Laterality: Left;       Family History:  family history includes Cancer in his father; Coronary artery disease in his mother; Diabetes in his mother. Otherwise pertinent FHx was reviewed and unremarkable.     Social History:  reports that he has never smoked. He has never used smokeless tobacco. He reports that he does not drink alcohol and does not use drugs.  Social History     Social History Narrative    Lives in [place].       Medications:  Available home medication information reviewed.  Medications Prior to Admission   Medication Sig Dispense Refill Last Dose   • apixaban (ELIQUIS) 5 MG tablet tablet Take 1 tablet by mouth Every 12 (Twelve) Hours. Indications: DVT/PE (active thrombosis) 180 tablet 0 10/1/2022 at Unknown time   • Blood Glucose Monitoring Suppl (ONE TOUCH ULTRA  2) w/Device kit USE TO CHECK GLUCOSE ONCE DAILY AS DIRECTED   10/2/2022 at Unknown time   • bumetanide (BUMEX) 2 MG tablet Take 1 tablet by mouth 2 (Two) Times a Day. 60 tablet 0 10/1/2022 at Unknown time   • Cholecalciferol (VITAMIN D3) 5000 units tablet tablet Take 5,000 Units by mouth Daily.   10/1/2022 at Unknown time   • Continuous Blood Gluc  (DEXCOM G6 ) device See Admin Instructions.   10/2/2022 at Unknown time   • Continuous Blood Gluc Sensor (Dexcom G6 Sensor) Every 10 (Ten) Days. 1 each 0 10/1/2022 at Unknown time   • Diclofenac Sodium (VOLTAREN) 1 % gel gel Apply 2 grams topically to the appropriate area as directed 4 times a day as needed for pain. 100 g 0 10/1/2022 at Unknown time   • famotidine (PEPCID) 20 MG tablet Take 20 mg by mouth 2 (Two) Times a Day.   10/1/2022 at Unknown time   • levothyroxine (SYNTHROID, LEVOTHROID) 50 MCG tablet Take 75 mcg by mouth Every Morning.   10/1/2022 at Unknown time   • magnesium oxide (MAG-OX) 400 MG tablet Take 400 mg by mouth Daily.   10/1/2022 at Unknown time   • Omega-3 Fatty Acids (FISH OIL PO) Take  by mouth.   10/1/2022 at Unknown time   • Probiotic Product (PROBIOTIC DAILY PO) Take  by mouth.   10/1/2022 at Unknown time   • sulfamethoxazole-trimethoprim (BACTRIM DS,SEPTRA DS) 800-160 MG per tablet Take 1 tablet by mouth 2 (Two) Times a Day.   10/1/2022 at Unknown time   • tamsulosin (FLOMAX) 0.4 MG capsule 24 hr capsule Take 1 capsule by mouth Daily. 30 capsule 0 10/1/2022 at Unknown time   • VITAMIN D PO Take  by mouth.   10/1/2022 at Unknown time   • ZINC CITRATE PO Take  by mouth.   10/1/2022 at Unknown time   • amLODIPine (NORVASC) 10 MG tablet Take 1 tablet by mouth Daily. (Patient not taking: Reported on 9/21/2022) 30 tablet 11    • aspirin 81 MG chewable tablet Chew 81 mg Daily. (Patient not taking: Reported on 9/21/2022)      • bethanechol (URECHOLINE) 5 MG tablet Take 2 tablets by mouth 3 (Three) Times a Day. (Patient not taking:  Reported on 9/21/2022) 180 tablet 0    • Continuous Blood Gluc Sensor (DEXCOM G6 SENSOR) APPLY 1 SENSOR AND CHANGE EVERY 10 DAYS      • Continuous Blood Gluc Transmit (DEXCOM G6 TRANSMITTER) misc ONE TRANSMITTER FOR EVERY 3 MONTHS      • HYDROcodone-acetaminophen (NORCO) 5-325 MG per tablet Take 1 tablet by mouth Every 4 (Four) Hours As Needed for Moderate Pain (Patient not taking: Reported on 9/21/2022) 10 tablet 0    • insulin detemir (LEVEMIR) 100 UNIT/ML injection Inject 10 Units under the skin into the appropriate area as directed Every Night. (Patient taking differently: Inject 8 Units under the skin into the appropriate area as directed Every Night.) 10 mL 0    • insulin lispro (humaLOG) 100 UNIT/ML injection Inject 5 Units under the skin into the appropriate area as directed 3 (Three) Times a Day Before Meals. (Patient not taking: Reported on 9/21/2022) 1 each 1    • ipratropium-albuterol (DUO-NEB) 0.5-2.5 mg/3 ml nebulizer Inhale 3 mL via nebulization as directed every 4 hours as needed for shortness of air. (Patient not taking: Reported on 9/21/2022) 360 mL 0    • MM PEN NEEDLES 32G X 4 MM misc USE 4 PER DAY      • ondansetron (ZOFRAN) 4 MG tablet Take 1 tablet by mouth Every 6 (Six) Hours As Needed for Nausea or Vomiting. (Patient not taking: Reported on 9/21/2022) 10 tablet 0    • polyethylene glycol (MIRALAX) 17 GM/SCOOP powder Mix 17 grams in 8 ounces of water and take by mouth once daily, as directed. (Patient not taking: Reported on 9/21/2022) 510 g 0    • sodium chloride (OCEAN) 0.65 % nasal spray 1 spray into the nostril(s) as directed by provider As Needed for Congestion. (Patient not taking: Reported on 9/21/2022)          No Known Allergies    Objective   Objective     Vital Signs:   Temp:  [97.7 °F (36.5 °C)-98.3 °F (36.8 °C)] 97.7 °F (36.5 °C)  Heart Rate:  [82-97] 83  Resp:  [20] 20  BP: (126-129)/(79-98) 126/79  Flow (L/min):  [4] 4       Physical Exam   Constitutional: Awake, alert,  chronically ill appearing  Eyes: PERRLA, sclerae anicteric, no conjunctival injection  HENT: NCAT, mucous membranes moist, 4L O2 NC  Neck: Supple, no thyromegaly, no lymphadenopathy, trachea midline  Respiratory: Clear to auscultation bilaterally, nonlabored respirations   Cardiovascular: RRR, no murmurs, rubs, or gallops, palpable pedal pulses bilaterally  Gastrointestinal: Positive bowel sounds, soft, nontender, nondistended  Musculoskeletal: BLE edema. RLE w/ redness/warmth from ankle to knee. Left toe amputation dressed.  Psychiatric: Appropriate affect, cooperative  Neurologic: Oriented x 3, strength symmetric in all extremities, Cranial Nerves grossly intact to confrontation, speech clear  Skin: No rashes    Result Review:  I have personally reviewed the results from the time of this admission to 10/2/2022 09:52 EDT and agree with these findings:  []  Laboratory list / accordion  []  Microbiology  []  Radiology  []  EKG/Telemetry   []  Cardiology/Vascular   []  Pathology  []  Old records  []  Other:  Most notable findings include:         LAB RESULTS:      Lab 10/02/22  0839 10/02/22  0332   WBC  --  5.50   HEMOGLOBIN  --  10.2*   HEMATOCRIT  --  32.4*   PLATELETS  --  142   NEUTROS ABS  --  3.79   IMMATURE GRANS (ABS)  --  0.01   LYMPHS ABS  --  0.98   MONOS ABS  --  0.57   EOS ABS  --  0.11   MCV  --  93.4   SED RATE  --  6   CRP  --  0.40   PROCALCITONIN 0.08  --    LACTATE  --  1.9         Lab 10/02/22  0839 10/02/22  0332   SODIUM  --  140   POTASSIUM  --  4.8   CHLORIDE  --  103   CO2  --  24.0   ANION GAP  --  13.0   BUN  --  33*   CREATININE  --  2.18*   EGFR  --  30.4*   GLUCOSE  --  175*   CALCIUM  --  9.2   HEMOGLOBIN A1C 7.20*  --          Lab 10/02/22  0332   TOTAL PROTEIN 6.1   ALBUMIN 3.80   GLOBULIN 2.3   ALT (SGPT) 16   AST (SGOT) 18   BILIRUBIN 0.2   ALK PHOS 85         Lab 10/02/22  0839 10/02/22  0332   PROBNP  --  25,781.0*   TROPONIN T 0.129* 0.139*                 UA    Urinalysis  2/27/22 8/23/22 8/23/22     1309 1309   Squamous Epithelial Cells, UA  3-6 (A)    Specific Gravity, UA   1.014   Ketones, UA 1+ (A)  Negative   Blood, UA   Large (3+) (A)   Leukocytes, UA Negative  Trace (A)   Nitrite, UA   Negative   RBC, UA  Too Numerous to Count (A)    WBC, UA  6-12 (A)    Bacteria, UA  None Seen    (A) Abnormal value              Microbiology Results (last 10 days)     ** No results found for the last 240 hours. **          XR Chest 1 View    Result Date: 10/2/2022  FRONTAL VIEW OF THE CHEST CLINICAL INDICATION: Dyspnea COMPARISON: 8/18/2022. FINDINGS: Stable heart size. Stable postsurgical changes, implantable device and leads. Worsening opacities are seen in the right midlung. Small right pleural effusion. No pneumothorax.     Impression: 1. Small right pleural effusion. 2. Right basilar opacities could reflect atelectasis or pneumonia. Electronically signed by:  Eyad Jones M.D.  10/2/2022 4:25 AM Mountain Time      Results for orders placed during the hospital encounter of 07/27/22    Adult Transthoracic Echo Complete W/ Cont if Necessary Per Protocol    Interpretation Summary  · Left ventricular ejection fraction appears to be 46 - 50%. Left ventricular systolic function is low normal.  · Left ventricular septal hypokinesis  · No significant valvular heart disease      Assessment & Plan   Assessment & Plan     Active Hospital Problems    Diagnosis  POA   • Elevated troponin [R77.8]  Yes   • Cellulitis of right lower extremity [L03.115]  Yes   • [place] (acute kidney injury) ([facility]) [N17.9]  Yes   • Acute on chronic HFrEF (heart failure with reduced ejection fraction) (Formerly [facility]) [I50.23]  Yes   • SSS (sick sinus syndrome) (Formerly [facility]) [I49.5]  Yes     Added automatically from request for surgery [id number]     • Type 2 diabetes mellitus ([facility]) [E11.9]  Yes     HA1C on 3/21/19 9.0 improved from 11.1 on 1/8/19     • Hypertension [I10]  Yes   • Hyperlipidemia [E78.5]  Yes   • Hypothyroidism (acquired) [E03.9]  Yes    • 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 Au [Z95.1]  Not Applicable     76 y/o male s/p recent toe amputation by Dr. Márquez presenting with acute onset SOA/chest heaviness.    Hypoxia  A/C HFmrEF  Right pleural effusion  Elevated troponin  --Start IV bumex BID through the day and assess response. Stop his norvasc indefinitely so as not to worsen his LE edema and start Coreg. No ace/arb for now given his renal failure.  --Patient requests cardiology consult, follows with Dr. Apodaca.  --Elevated troponin is likely due to CHF in setting of his renal failure. No CP at this time and his troponin is now decreasing.     RLE cellulitis  Recent Left toe amputation  --Previously on bactrim w/out improvement. Start IV vancomycin/zosyn. Have taken picture and uploaded into [employer]  --PT wound care.    [place]  --Suspect mostly due to Bactrim use. Stop that med and watch creatinine closely with BID diuresis.     DM2 w/ hyperglycemia  --Basal bolus insulin w/ SSI.    DVT prophylaxis: Eliquis    CODE STATUS:  Full Code  Code Status and Medical Interventions:   Ordered at: 10/02/22 0949     Code Status (Patient has no pulse and is not breathing):    CPR (Attempt to Resuscitate)     Medical Interventions (Patient has pulse or is breathing):    Full Support         Renetta Gatica II, DO  10/02/22

## 2022-10-02 NOTE — ED PROVIDER NOTES
EMERGENCY DEPARTMENT ENCOUNTER    Pt Name: Jermaine Singh  MRN: [id number]  Pt :   1945  Room Number:  S211/1  Date of encounter:  10/2/2022  PCP: Farooq Painter MD  ED Provider: Salinas Dunn MD          HPI:: Jermaine Singh is a 77 y.o. male who presents to the ED by EMS from home, with substernal chest pressure that has woken him from sleep.  He does have a history of CABG, 2019, and diabetes.  He has had edema, on both of his legs, up to his thighs for about a week.  Recent hospitalization for peripheral vascular disease and left foot infection.  He is currently been on Bactrim, for a right leg affection as well.  At the site of prior toe amputation, he has had some continued drainage from the wound, also some drainage on the right lower extremity, at the area of redness that he has been treated for with the Bactrim.      PAST MEDICAL HISTORY  Past Medical History:   Diagnosis Date   • Asthma    • Coronary artery disease    • Diabetes mellitus (HCC)     started on inuslin 2018; started on po meds in ; checking blood sugars daily    • Disease of thyroid gland     po meds daily for hypothyroidism    • History of fracture as a child     rt leg- severe    • Hyperlipidemia    • Hypertension    • Hypothyroidism    • Peripheral neuropathy    • Peripheral vascular disease (HCC)     s/p angiogram -needs stent in left leg    • RBBB    • Right knee pain    • Vitamin D deficiency          PAST SURGICAL HISTORY  Past Surgical History:   Procedure Laterality Date   • APPENDECTOMY     • CARDIAC CATHETERIZATION N/A 2019    Procedure: Left Heart Cath;  Surgeon: Cooper Apodaca MD;  Location:  [facility] CATH INVASIVE LOCATION;  Service: Cardiology   • CARDIAC ELECTROPHYSIOLOGY PROCEDURE N/A 2022    Procedure: DEVICE IMPLANT;  Surgeon: Cooper Apodaca MD;  Location:  [facility] CATH INVASIVE LOCATION;  Service: Cardiology;  Laterality: N/A;   • COLONOSCOPY     • CORONARY ARTERY  BYPASS GRAFT N/A 3/22/2019    Procedure: MEDIAN STERNOTOMY, CORONARY ARTERY BYPASS GRAFT X3, UTILIZING THE LEFT INTERNAL MAMMARY ARTERY, EVH AND OPEN HARVEST OF THE RIGHT GREATER SAPHENOUS VEIN, EXPLORATION OF THE LEFT LEG;  Surgeon: Ap Au MD;  Location: [facility] OR;  Service: Cardiothoracic   • EYE SURGERY Bilateral     cataracts    • INTERVENTIONAL RADIOLOGY PROCEDURE N/A 7/29/2021    Procedure: Abdominal Aortagram with Runoff;  Surgeon: Jermaine Hernandez MD;  Location: [facility] CATH INVASIVE LOCATION;  Service: Cardiovascular;  Laterality: N/A;   • KNEE ARTHROSCOPY      right x 2, left x 1   • LACERATION REPAIR      right leg   • LEG SURGERY      2 for fracture of rt leg    • TONSILLECTOMY      Adnoidectomy   • TRANS METATARSAL AMPUTATION Left 8/2/2022    Procedure: GREAT TOE AMPUTATION LEFT;  Surgeon: Gopal Márquez MD;  Location: [facility] OR;  Service: Vascular;  Laterality: Left;         FAMILY HISTORY  Family History   Problem Relation Age of Onset   • Coronary artery disease Mother    • Diabetes Mother    • Cancer Father          SOCIAL HISTORY  Social History     Socioeconomic History   • Marital status:    • Number of children: 2   Tobacco Use   • Smoking status: Never Smoker   • Smokeless tobacco: Never Used   Vaping Use   • Vaping Use: Never used   Substance and Sexual Activity   • Alcohol use: No   • Drug use: No   • Sexual activity: Defer         ALLERGIES  Patient has no known allergies.        REVIEW OF SYSTEMS  Review of Systems     Constitutional: Negative. No fever, no weakness.   HENT: Negative for sneezing and sore throat.    Respiratory: Negative for cough. Negative for shortness of breath.    Cardiovascular: Negative.  Negative for chest pain.   Gastrointestinal: Negative.  Negative for abdominal pain.   Genitourinary: Negative.  Negative for difficulty urinating.     All systems reviewwed and negative except as noted in HPI.  PHYSICAL EXAM    I have reviewed the triage  vital signs and nursing notes.    ED Triage Vitals   Temp Heart Rate Resp BP SpO2   10/02/22 0309 10/02/22 0306 10/02/22 0306 10/02/22 0306 10/02/22 0306   98.3 °F (36.8 °C) 97 20 129/98 100 %      Temp src Heart Rate Source Patient Position BP Location FiO2 (%)   10/02/22 0309 10/02/22 0306 10/02/22 0306 10/02/22 0306 --   Oral Monitor Sitting Right arm        Physical Exam  GENERAL:   Appears uncomfortable.  HENT: Nares patent.  EYES: No scleral icterus.  CV: Regular rhythm, regular rate.  RESPIRATORY: Normal effort.  No audible wheezes, rales or rhonchi.  ABDOMEN: Soft, nontender  MUSCULOSKELETAL: No deformities.   NEURO: Alert, moves all extremities, follows commands.  SKIN: Warm, dry, no rash visualized.        LAB RESULTS  Recent Results (from the past 24 hour(s))   ECG 12 Lead    Collection Time: 10/02/22  3:09 AM   Result Value Ref Range    QT Interval 480 ms    QTC Interval 536 ms   Comprehensive Metabolic Panel    Collection Time: 10/02/22  3:32 AM    Specimen: Blood   Result Value Ref Range    Glucose 175 (H) 65 - 99 mg/dL    BUN 33 (H) 8 - 23 mg/dL    Creatinine 2.18 (H) 0.76 - 1.27 mg/dL    Sodium 140 136 - 145 mmol/L    Potassium 4.8 3.5 - 5.2 mmol/L    Chloride 103 98 - 107 mmol/L    CO2 24.0 22.0 - 29.0 mmol/L    Calcium 9.2 8.6 - 10.5 mg/dL    Total Protein 6.1 6.0 - 8.5 g/dL    Albumin 3.80 3.50 - 5.20 g/dL    ALT (SGPT) 16 1 - 41 U/L    AST (SGOT) 18 1 - 40 U/L    Alkaline Phosphatase 85 39 - 117 U/L    Total Bilirubin 0.2 0.0 - 1.2 mg/dL    Globulin 2.3 gm/dL    A/G Ratio 1.7 g/dL    BUN/Creatinine Ratio 15.1 7.0 - 25.0    Anion Gap 13.0 5.0 - 15.0 mmol/L    eGFR 30.4 (L) >60.0 mL/min/1.73   BNP    Collection Time: 10/02/22  3:32 AM    Specimen: Blood   Result Value Ref Range    proBNP 25,781.0 (H) 0.0 - 1,800.0 pg/mL   Troponin    Collection Time: 10/02/22  3:32 AM    Specimen: Blood   Result Value Ref Range    Troponin T 0.139 (C) 0.000 - 0.030 ng/mL   Green Top (Gel)    Collection Time:  10/02/22  3:32 AM   Result Value Ref Range    Extra Tube Hold for add-ons.    Lavender Top    Collection Time: 10/02/22  3:32 AM   Result Value Ref Range    Extra Tube hold for add-on    Gold Top - SST    Collection Time: 10/02/22  3:32 AM   Result Value Ref Range    Extra Tube Hold for add-ons.    Gray Top    Collection Time: 10/02/22  3:32 AM   Result Value Ref Range    Extra Tube Hold for add-ons.    Light Blue Top    Collection Time: 10/02/22  3:32 AM   Result Value Ref Range    Extra Tube Hold for add-ons.    CBC Auto Differential    Collection Time: 10/02/22  3:32 AM    Specimen: Blood   Result Value Ref Range    WBC 5.50 3.40 - 10.80 10*3/mm3    RBC 3.47 (L) 4.14 - 5.80 10*6/mm3    Hemoglobin 10.2 (L) 13.0 - 17.7 g/dL    Hematocrit 32.4 (L) 37.5 - 51.0 %    MCV 93.4 79.0 - 97.0 fL    MCH 29.4 26.6 - 33.0 pg    MCHC 31.5 31.5 - 35.7 g/dL    RDW 13.8 12.3 - 15.4 %    RDW-SD 47.2 37.0 - 54.0 fl    MPV 13.3 (H) 6.0 - 12.0 fL    Platelets 142 140 - 450 10*3/mm3    Neutrophil % 68.9 42.7 - 76.0 %    Lymphocyte % 17.8 (L) 19.6 - 45.3 %    Monocyte % 10.4 5.0 - 12.0 %    Eosinophil % 2.0 0.3 - 6.2 %    Basophil % 0.7 0.0 - 1.5 %    Immature Grans % 0.2 0.0 - 0.5 %    Neutrophils, Absolute 3.79 1.70 - 7.00 10*3/mm3    Lymphocytes, Absolute 0.98 0.70 - 3.10 10*3/mm3    Monocytes, Absolute 0.57 0.10 - 0.90 10*3/mm3    Eosinophils, Absolute 0.11 0.00 - 0.40 10*3/mm3    Basophils, Absolute 0.04 0.00 - 0.20 10*3/mm3    Immature Grans, Absolute 0.01 0.00 - 0.05 10*3/mm3    nRBC 0.0 0.0 - 0.2 /100 WBC   C-reactive Protein    Collection Time: 10/02/22  3:32 AM    Specimen: Blood   Result Value Ref Range    C-Reactive Protein 0.40 0.00 - 0.50 mg/dL   Sedimentation Rate    Collection Time: 10/02/22  3:32 AM    Specimen: Blood   Result Value Ref Range    Sed Rate 6 0 - 20 mm/hr   Lactic Acid, Plasma    Collection Time: 10/02/22  3:32 AM    Specimen: Blood   Result Value Ref Range    Lactate 1.9 0.5 - 2.0 mmol/L   Hemoglobin A1c     Collection Time: 10/02/22  8:39 AM    Specimen: Blood   Result Value Ref Range    Hemoglobin A1C 7.20 (H) 4.80 - 5.60 %       If labs were ordered, I independently reviewed the results.        RADIOLOGY  XR Chest 1 View    Result Date: 10/2/2022  FRONTAL VIEW OF THE CHEST CLINICAL INDICATION: Dyspnea COMPARISON: 8/18/2022. FINDINGS: Stable heart size. Stable postsurgical changes, implantable device and leads. Worsening opacities are seen in the right midlung. Small right pleural effusion. No pneumothorax.     1. Small right pleural effusion. 2. Right basilar opacities could reflect atelectasis or pneumonia. Electronically signed by:  Eyad Jones M.D.  10/2/2022 4:25 AM Mountain Time    PROCEDURES    Procedures    ECG 12 Lead   Final Result   Test Reason : SOA Protocol   Blood Pressure :   */*   mmHG   Vent. Rate :  75 BPM     Atrial Rate :  75 BPM      P-R Int : 106 ms          QRS Dur : 178 ms       QT Int : 480 ms       P-R-T Axes :   9 -52 124 degrees      QTc Int : 536 ms      Atrial-sensed ventricular-paced rhythm with frequent premature ventricular    complexes   Abnormal ECG   When compared with ECG of 17-AUG-2022 01:47,   premature ventricular complexes are now present   Vent. rate has increased BY   7 BPM   Confirmed by IVONNE MARQUIS ([id number]) on 10/2/2022 4:00:23 AM      Referred By: EDMD           Confirmed By: IVONNE MARQUIS          MEDICATIONS GIVEN IN ER    Medications   sodium chloride 0.9 % flush 10 mL (has no administration in time range)   nitroglycerin (NITROSTAT) SL tablet 0.4 mg (has no administration in time range)   Influenza Vac High-Dose Quad (FLUZONE HIGH DOSE) injection 0.7 mL (has no administration in time range)   furosemide (LASIX) injection 20 mg (20 mg Intravenous Given 10/2/22 4791)   cefTRIAXone (ROCEPHIN) 2 g/100 mL 0.9% NS IVPB (MBP) (0 g Intravenous Stopped 10/2/22 3548)   LORazepam (ATIVAN) injection 0.25 mg (0.25 mg Intravenous Given 10/2/22 9297)         PROGRESS,  DATA ANALYSIS, CONSULTS, AND MEDICAL DECISION MAKING  77-year-old male presenting from home when he was awakened with substernal chest pain, history of CABG a few years prior.  Is also been treated recently, with Bactrim, outpatient for right lower extremity cellulitis, without much improvement.  He does have a sore on his gluteal area on the right, and a draining foot wound status post toe amputation on the left.    AS OF 09:10 EDT VITALS:    BP - 126/79  HR - 83  TEMP - 97.7 °F (36.5 °C) (Oral)  O2 SATS - 99%                  DIAGNOSIS  Final diagnoses:   Angina at rest (HCC)   Systolic congestive heart failure, unspecified HF chronicity (HCC)   Cellulitis of right lower extremity         DISPOSITION  admit           Salinas Dunn MD  10/02/22 0911

## 2022-10-02 NOTE — PROGRESS NOTES
"Pharmacy Consult-Vancomycin Dosing  Jermaine Singh is a  77 y.o. male receiving vancomycin therapy.     Indication: Skin and soft tissue infection  Consulting Provider: Hospitalist  ID Consult: No    Goal Trough: 10-15 mcg/mL    Current Antimicrobial Therapy  Anti-Infectives (From admission, onward)      Ordered     Dose/Rate Route Frequency Start Stop    10/02/22 0948  piperacillin-tazobactam (ZOSYN) 3.375 g in iso-osmotic dextrose 50 ml (premix)        Ordering Provider: Renetta Gatica II DO    3.375 g  over 4 Hours Intravenous Every 8 Hours 10/02/22 1800 10/07/22 1759    10/02/22 0948  piperacillin-tazobactam (ZOSYN) 3.375 g in iso-osmotic dextrose 50 ml (premix)        Ordering Provider: Renetta Gatica II DO    3.375 g  over 30 Minutes Intravenous Once 10/02/22 1100      10/02/22 0948  Pharmacy to dose vancomycin        Ordering Provider: Renetta Gatica II, DO     Does not apply Continuous PRN 10/02/22 0947 10/07/22 0946    10/02/22 0348  cefTRIAXone (ROCEPHIN) 2 g/100 mL 0.9% NS IVPB (MBP)        Ordering Provider: Salinas Dunn MD    2 g  over 30 Minutes Intravenous Once 10/02/22 0350 10/02/22 0527          Allergies  Allergies as of 10/02/2022    (No Known Allergies)     Labs    Results from last 7 days   Lab Units 10/02/22  0332   BUN mg/dL 33*   CREATININE mg/dL 2.18*     Results from last 7 days   Lab Units 10/02/22  0332   WBC 10*3/mm3 5.50     Evaluation of Dosing     Last Dose Received in the ED/Outside Facility: N/A  Is Patient on Dialysis or Renal Replacement: No    Ht - 190.5 cm (75\")  Wt - 103 kg (227 lb)    Estimated Creatinine Clearance: 36.9 mL/min (A) (by C-G formula based on SCr of 2.18 mg/dL (H)).    Intake & Output (last 3 days)       None          Microbiology and Radiology  Microbiology Results (last 10 days)       ** No results found for the last 240 hours. **          Vancomycin Levels:              Assessment/Plan:    Pharmacy to dose vancomycin for skin and soft tissue " infection.  Will give a loading dose of vancomycin 1750 mg IV x 1 this morning. Due to ALEX[place]RIA on admission, dose further per levels.  Check vanc AM random 10/3.   Monitor renal function, cultures and sensitivities, and clinical status, and adjust regimen as necessary.  Pharmacy will continue to follow.    Mya Wheatley, PharmD, BCPS  10/2/2022  09:56 EDT

## 2022-10-02 NOTE — CONSULTS
[facility] at [facility]  CARDIOLOGY CONSULTATION NOTE    Jermaine Singh  : 1945  MRN:[id number]  Home Phone:[phone]    Date of Admission:10/2/2022  Date of Consultation: 10/02/22   Primary Provider:  Farooq Painter MD    Referring Provider: No ref. provider found  Reason for Consultation: Chest Pain    IDENTIFICATION: A 77 y.o. male     CC:   Chief Complaint   Patient presents with   • Shortness of Breath       PROBLEM LIST:   1. Chest pain/SOA  2. Cellulitis of right lower extremity  3. Coronary artery disease s/p CABG x3 in 2019, Dr. Au  4. Acute on chronic heart failure with reduced ejection fraction  a. Echo 2022: EF 46 to 50%, left ventricular septal hypokinesis, no significant valvular disease  5. History of sick sinus syndrome status post permanent pacemaker implant, [facility]  6. Type 2 diabetes mellitus  7. Peripheral arterial disease  a. LE angioplasty 2021  8. 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 Hypertension  10. Hyperlipidemia  11. Hypothyroidism  12. Surgical history:  a. Appendectomy  b. CABG   c. Eye surgery bilateral cataracts  d. Right lower extremity surgery s/p fracture  e. Tonsillectomy  f. Transmetatarsal amputation 2022        Type 2 diabetes mellitus (HCC)    Hypertension    Hyperlipidemia    Hypothyroidism (acquired)    S/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 Au    SSS (sick sinus syndrome) (HCC)    [place]RI[place] (acute kidney injury) (HCC)    Acute on chronic HFrEF (heart failure with reduced ejection fraction) (HCC)    Elevated troponin    Cellulitis of right lower extremity      ALLERGIES: No Known Allergies    HPI: Mr. Singh is a 77-year-old male with the above cardiac history we are seeing in consultation for chest pressure and dyspnea.  He has a history of CAD status post CABG , sick sinus syndrome status post permanent pacemaker implant in 2022, and bilateral DVTs treated with Eliquis.  Recent hospitalization for  peripheral vascular disease and left foot osteomyelitis status post transmetatarsal amputation 8/2/2022.  He presents today complaining of smothering that hit him suddenly in the middle of the night while sleeping.  He had nonradiating chest pain that he describes as an elephant sitting on his chest at the time that greatly improved shortly after arriving at the ER unrelated to any treatment. He is still reporting some mild chest tightness. He has undergone no further ischemic evaluation since his bypass in 2019. Patient reports 1 week history of worsening lower extremity edema for which his PCP recently increase his Bumex dose to 2 mg once daily with slight improvement. He has eceived 20 IV furosemide and 2 mg bumetanide IV since his arrival.    His troponin has decreased from 0.139 to 0.129. Creatinine is 2.18. ProBNP is 25,000+. EKG with atrial sensing, ventricular paced rhythm with PVCs. CXR with r pleural effusion and right basilar opacities.     ROS: All systems have been reviewed and are negative with the exception of those mentioned in the HPI and problem list above.    HOME MEDICINES:   Current Outpatient Medications   Medication Instructions   • amLODIPine (NORVASC) 10 mg, Oral, Daily   • apixaban (ELIQUIS) 5 mg, Oral, Every 12 Hours Scheduled   • aspirin 81 mg, Daily   • bethanechol (URECHOLINE) 10 mg, Oral, 3 Times Daily   • bumetanide (BUMEX) 2 mg, Oral, 2 Times Daily   • Diclofenac Sodium (VOLTAREN) 1 % gel gel Apply 2 grams topically to the appropriate area as directed 4 times a day as needed for pain.   • famotidine (PEPCID) 20 mg, Oral, 2 Times Daily   • HYDROcodone-acetaminophen (NORCO) 5-325 MG per tablet Take 1 tablet by mouth Every 4 (Four) Hours As Needed for Moderate Pain   • insulin detemir (LEVEMIR) 10 Units, Subcutaneous, Nightly   • insulin lispro (HUMALOG) 5 Units, Subcutaneous, 3 Times Daily Before Meals   • ipratropium-albuterol (DUO-NEB) 0.5-2.5 mg/3 ml nebulizer Inhale 3 mL via  nebulization as directed every 4 hours as needed for shortness of air.   • levothyroxine (SYNTHROID, LEVOTHROID) 75 mcg, Oral, Every Morning   • magnesium oxide (MAG-OX) 400 mg, Oral, Daily   • Omega-3 Fatty Acids (FISH OIL PO) Oral   • ondansetron (ZOFRAN) 4 mg, Oral, Every 6 Hours PRN   • polyethylene glycol (MIRALAX) 17 GM/SCOOP powder Mix 17 grams in 8 ounces of water and take by mouth once daily, as directed.   • Probiotic Product (PROBIOTIC DAILY PO) Oral   • sodium chloride (OCEAN) 0.65 % nasal spray 1 spray, As Needed   • sulfamethoxazole-trimethoprim (BACTRIM DS,SEPTRA DS) 800-160 MG per tablet 1 tablet, Oral, 2 Times Daily   • tamsulosin (FLOMAX) 0.4 mg, Oral, Daily   • VITAMIN D PO Oral   • vitamin D3 5,000 Units, Oral, Daily   • ZINC CITRATE PO Oral       Surgical History:   Past Surgical History:   Procedure Laterality Date   • APPENDECTOMY     • CARDIAC CATHETERIZATION N/A 2/14/2019    Procedure: Left Heart Cath;  Surgeon: Cooper Apodaca MD;  Location:  [facility];  Service: Cardiology   • CARDIAC ELECTROPHYSIOLOGY PROCEDURE N/A 5/18/2022    Procedure: DEVICE IMPLANT;  Surgeon: Cooper Apodaca MD;  Location:  [facility] CATH INVASIVE [facility];  Service: Cardiology;  Laterality: N/A;   • COLONOSCOPY     • CORONARY ARTERY BYPASS GRAFT N/A 3/22/2019    Procedure: MEDIAN STERNOTOMY, CORONARY ARTERY BYPASS GRAFT X3, UTILIZING THE LEFT INTERNAL MAMMARY ARTERY, EVH AND OPEN HARVEST OF THE RIGHT GREATER SAPHENOUS VEIN, EXPLORATION OF THE LEFT LEG;  Surgeon: Ap Au MD;  Location:  [facility];  Service: Cardiothoracic   • EYE SURGERY Bilateral     cataracts    • INTERVENTIONAL RADIOLOGY PROCEDURE N/A 7/29/2021    Procedure: Abdominal Aortagram with Runoff;  Surgeon: Jermaine Hernandez MD;  Location:  [facility] INVASIVE LOC[facility];  Service: Cardiovascular;  Laterality: N/A;   • KNEE ARTHROSCOPY      right x 2, left x 1   • LACERATION REPAIR      right leg   • LEG SURGERY       "2 for fracture of rt leg    • TONSILLECTOMY      Adnoidectomy   • TRANS METATARSAL AMPUTATION Left 8/2/2022    Procedure: GREAT TOE AMPUTATION LEFT;  Surgeon: Gopal Márquez MD;  [facility];  Service: Vascular;  Laterality: Left;       Social History:   Social History     Socioeconomic History   • Marital status:    • Number of children: 2   Tobacco Use   • Smoking status: Never Smoker   • Smokeless tobacco: Never Used   Vaping Use   • Vaping Use: Never used   Substance and Sexual Activity   • Alcohol use: No   • Drug use: No   • Sexual activity: Defer       Family History:   Family History   Problem Relation Age of Onset   • Coronary artery disease Mother    • Diabetes Mother    • Cancer Father        Objective     /79 (BP Location: Right arm, Patient Position: Lying)   Pulse 83   Temp 97.7 °F (36.5 °C) (Oral)   Resp 20   Ht 190.5 cm (75\")   Wt 103 kg (227 lb)   SpO2 99%   BMI 28.37 kg/m²   No intake or output data in the 24 hours ending 10/02/22 1205    PHYSICAL EXAM:  CONSTITUTIONAL: well developed older WM, lying in bed  CARDIOVASCULAR:  Regular rhythm and normal rate, no murmur, gallop, rub.   RESPIRATORY: 4 L/min O2 via NC, decreased air movement in bases, basilar rales  GI: Soft, tender to palpation  EXTREMITIES: pitting edema up to the flanks, scrotal edema, 3+ edema of thighs  SKIN: Warm, dry. RLE with dark red rash anteriorly without induration, some weeping of clear fluid  NEUROLOGICAL: No gross focal deficits  PSYCHIATRIC: Normal mood and affect. Behavior is normal     Labs/Diagnostic Data  Results from last 7 days   Lab Units 10/02/22  0332   SODIUM mmol/L 140   POTASSIUM mmol/L 4.8   CHLORIDE mmol/L 103   CO2 mmol/L 24.0   BUN mg/dL 33*   CREATININE mg/dL 2.18*   GLUCOSE mg/dL 175*   CALCIUM mg/dL 9.2     Results from last 7 days   Lab Units 10/02/22  0839 10/02/22  0332   TROPONIN T ng/mL 0.129* 0.139*     Results from last 7 days   Lab Units 10/02/22  0332   WBC 10*3/mm3 " 5.50   HEMOGLOBIN g/dL 10.2*   HEMATOCRIT % 32.4*   PLATELETS 10*3/mm3 142                 Results from last 7 days   Lab Units 10/02/22  0839   HEMOGLOBIN A1C % 7.20*     Results from last 7 days   Lab Units 10/02/22  0332   PROBNP pg/mL 25,781.0*           I personally reviewed the patient's EKG/Telemetry data    Radiology Data:  Echo 8/11/2022:  · Left ventricular ejection fraction appears to be 46 - 50%. Left ventricular systolic function is low normal.  · Left ventricular septal hypokinesis  · No significant valvular heart disease    Duplex bilateral lower extremity 8/13/2022:  · Acute right lower extremity deep vein thrombosis noted in the posterior tibial and peroneal.  · Acute left lower extremity deep vein thrombosis noted in the peroneal.  · Acute left lower extremity superficial thrombophlebitis noted in the small saphenous.  · Acute right lower extremity superficial thrombophlebitis noted in the small saphenous.    CXR 10/2/2022:  IMPRESSION:  1. Small right pleural effusion.  2. Right basilar opacities could reflect atelectasis or pneumonia.    Current Medications:    apixaban, 5 mg, Oral, Q12H  aspirin, 81 mg, Oral, Daily  atorvastatin, 20 mg, Oral, Nightly  bethanechol, 10 mg, Oral, TID  bumetanide, 2 mg, Intravenous, Q12H  carvedilol, 3.125 mg, Oral, BID With Meals  famotidine, 20 mg, Oral, Daily  insulin detemir, 10 Units, Subcutaneous, Nightly  insulin lispro, 0-9 Units, Subcutaneous, TID AC  lactobacillus acidophilus, 1 capsule, Oral, Daily  levothyroxine, 75 mcg, Oral, Q AM  pharmacy consult - MTM, , Does not apply, Daily  piperacillin-tazobactam, 3.375 g, Intravenous, Once  piperacillin-tazobactam, 3.375 g, Intravenous, Q8H  senna-docusate sodium, 2 tablet, Oral, BID  sodium chloride, 10 mL, Intravenous, Q12H  sodium chloride, 10 mL, Intravenous, Q12H  tamsulosin, 0.4 mg, Oral, Daily  [START ON 10/3/2022] vancomycin (dosing per levels), , Does not apply, Daily  vancomycin, 1,750 mg, Intravenous,  Once      Pharmacy to dose vancomycin,       Assessment  1. Elevated troponin/chest pain with history of CAD status post CABG x3  1. Peaked Troponin 0.139 --> 0.129 -> 0.107  2. EKG atrial sensed, ventricular paced rhythm with frequent PVCs  2. Acute on chronic heart failure with mid-range EF  1. Echo 8/11/2022: EF 46 to 50%, left ventricular septal hypokinesis  3. History of SSS, trifascicular block with permanent pacemaker placement  1. [facility] dual-chamber PM 2272  4. Type 2 diabetes mellitus  5. 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 [place]RI[place]  7. RLE cellulitis  8. Peripheral vascular disease  1. Left and right iliac stents present, confirmed patent 7/29/2021  9. Hypertension  10. Hyperlipidemia  11. Hypothyroidism    Plan  · We will plan for a [employer] [facility] Device interrogation tomorrow.   · Patient has stable troponin values peaked at 0.139 in the setting of [place]. EKG with nonspecific changes. Patient has not had ischemic evaluation since his bypass per patient.   · He is not requiring emergent cath currently while suffering from [place] and extensive Lower Extremity cellulitis. We will defer ischemic evaluation to Dr. Apodaca tomorrow. We will make patient NPO after midnight in the interim in case of procedure tomorrow.  · Continue aspirin, atorvastatin, and carvedilol  · Continue aggressive IV diuretics  · We will hold apixaban for possible cath    Scribed by Tushar Boone PA-C for Dr. Donny Hidalgo MD on 10/02/22     I personally performed the services described in this documentation as scribed by the above named individual in my presence, and it is both accurate and complete.    CARLO Hidalgo MD, MS  10/02/22  18:02 EDT

## 2022-10-02 NOTE — CASE MANAGEMENT/SOCIAL WORK
Discharge Planning Assessment  [facility]     Patient Name: Jermaine Singh  MRN: [id number]  Today's Date: 10/2/2022    Admit Date: 10/2/2022    Plan: Home w/Home Health   Discharge Needs Assessment     Row Name 10/02/22 1454       Living Environment    People in Home spouse    Current Living Arrangements home    Primary Care Provided by self    Provides Primary Care For no one, unable/limited ability to care for self    Family Caregiver if Needed spouse    Quality of Family Relationships involved;supportive    Able to Return to Prior Arrangements yes       Resource/Environmental Concerns    Resource/Environmental Concerns none       Transition Planning    Patient/Family Anticipates Transition to home with help/services    Patient/Family Anticipated Services at Transition ;rehabilitation services    Transportation Anticipated family or friend will provide       Discharge Needs Assessment    Readmission Within the Last 30 Days no previous admission in last 30 days    Current Outpatient/Agency/Support Group homecare agency    Equipment Currently Used at Home commode;walker, rolling;wheelchair    Concerns to be Addressed discharge planning    Anticipated Changes Related to Illness none               Discharge Plan     Row Name 10/02/22 1455       Plan    Plan Home w/Home Health    Patient/Family in Agreement with Plan yes    Plan Comments Spoke with patient's wife to initiate discharge planning.  He lives with her in [facility].  Prior to admission, he used a wheelchair for mobility and wife assisted with ADL's.  He also has a bedside commode and rolling walker at home.  Per wife, he is current with [facility] for PT, OT and skilled nursing.  Will need to verify on Monday.  His PCP is Farooq Painter.  He has an advanced directive.  Mr. Singh has RX coverage and has his scripts filled at [facility].  His goal is to return home with home health.  Will await therapy recommendations to determine  proper discharge placement.  CM will continue to follow.    Final Discharge Disposition Code 06 - home with home health care              Continued Care and Services - Admitted Since 10/2/2022    Coordination has not been started for this encounter.     Selected Continued Care - Prior Encounters Includes selections from prior encounters from 7/4/2022 to 10/2/2022    Discharged on 8/26/2022 Admission date: 7/27/2022 - Discharge disposition: Rehab Facility or Unit (DC - External)    Destination     Service Provider Selected Services Address Phone Fax Patient Preferred    [facility]  Inpatient Rehabilitation [address] [facility] [id number] [phone] --                       Demographic Summary     Row Name 10/02/22 1453       General Information    Admission Type inpatient    Arrived From emergency department    Referral Source admission list    Reason for Consult discharge planning    Preferred Language English               Functional Status     Row Name 10/02/22 1453       Functional Status    Usual Activity Tolerance fair    Current Activity Tolerance fair       Functional Status, IADL    Medications completely dependent    Meal Preparation completely dependent    Housekeeping completely dependent    Laundry completely dependent    Shopping completely dependent               Psychosocial    No documentation.                Abuse/Neglect    No documentation.                Legal    No documentation.                Substance Abuse    No documentation.                Patient Forms    No documentation.                   Pauly Lynn, RN

## 2022-10-03 ENCOUNTER — APPOINTMENT (OUTPATIENT)
Dept: CARDIOLOGY | Facility: HOSPITAL | Age: 77
End: 2022-10-03

## 2022-10-03 LAB
ANION GAP SERPL CALCULATED.3IONS-SCNC: 10 MMOL/L (ref 5–15)
APTT PPP: 42.3 SECONDS (ref 60–90)
BASOPHILS # BLD AUTO: 0.06 10*3/MM3 (ref 0–0.2)
BASOPHILS NFR BLD AUTO: 1.2 % (ref 0–1.5)
BUN SERPL-MCNC: 33 MG/DL (ref 8–23)
BUN/CREAT SERPL: 15.3 (ref 7–25)
CALCIUM SPEC-SCNC: 8.8 MG/DL (ref 8.6–10.5)
CHLORIDE SERPL-SCNC: 105 MMOL/L (ref 98–107)
CO2 SERPL-SCNC: 26 MMOL/L (ref 22–29)
CREAT SERPL-MCNC: 2.15 MG/DL (ref 0.76–1.27)
DEPRECATED RDW RBC AUTO: 47.8 FL (ref 37–54)
EGFRCR SERPLBLD CKD-EPI 2021: 30.9 ML/MIN/1.73
EOSINOPHIL # BLD AUTO: 0.15 10*3/MM3 (ref 0–0.4)
EOSINOPHIL NFR BLD AUTO: 2.9 % (ref 0.3–6.2)
ERYTHROCYTE [DISTWIDTH] IN BLOOD BY AUTOMATED COUNT: 14 % (ref 12.3–15.4)
GLUCOSE BLDC GLUCOMTR-MCNC: 128 MG/DL (ref 70–130)
GLUCOSE BLDC GLUCOMTR-MCNC: 155 MG/DL (ref 70–130)
GLUCOSE BLDC GLUCOMTR-MCNC: 90 MG/DL (ref 70–130)
GLUCOSE BLDC GLUCOMTR-MCNC: 95 MG/DL (ref 70–130)
GLUCOSE SERPL-MCNC: 110 MG/DL (ref 65–99)
HCT VFR BLD AUTO: 30.8 % (ref 37.5–51)
HGB BLD-MCNC: 9.7 G/DL (ref 13–17.7)
IMM GRANULOCYTES # BLD AUTO: 0.01 10*3/MM3 (ref 0–0.05)
IMM GRANULOCYTES NFR BLD AUTO: 0.2 % (ref 0–0.5)
INR PPP: 1.48 (ref 0.84–1.13)
LYMPHOCYTES # BLD AUTO: 1.25 10*3/MM3 (ref 0.7–3.1)
LYMPHOCYTES NFR BLD AUTO: 24.4 % (ref 19.6–45.3)
MCH RBC QN AUTO: 29.5 PG (ref 26.6–33)
MCHC RBC AUTO-ENTMCNC: 31.5 G/DL (ref 31.5–35.7)
MCV RBC AUTO: 93.6 FL (ref 79–97)
MONOCYTES # BLD AUTO: 0.44 10*3/MM3 (ref 0.1–0.9)
MONOCYTES NFR BLD AUTO: 8.6 % (ref 5–12)
NEUTROPHILS NFR BLD AUTO: 3.22 10*3/MM3 (ref 1.7–7)
NEUTROPHILS NFR BLD AUTO: 62.7 % (ref 42.7–76)
NRBC BLD AUTO-RTO: 0 /100 WBC (ref 0–0.2)
PLATELET # BLD AUTO: 131 10*3/MM3 (ref 140–450)
PMV BLD AUTO: 11.9 FL (ref 6–12)
POTASSIUM SERPL-SCNC: 5.1 MMOL/L (ref 3.5–5.2)
PROTHROMBIN TIME: 17.9 SECONDS (ref 11.4–14.4)
RBC # BLD AUTO: 3.29 10*6/MM3 (ref 4.14–5.8)
SODIUM SERPL-SCNC: 141 MMOL/L (ref 136–145)
TROPONIN T SERPL-MCNC: 0.13 NG/ML (ref 0–0.03)
UFH PPP CHRO-ACNC: >1.1 IU/ML (ref 0.3–0.7)
WBC NRBC COR # BLD: 5.13 10*3/MM3 (ref 3.4–10.8)

## 2022-10-03 PROCEDURE — A9500 TC99M SESTAMIBI: HCPCS | Performed by: INTERNAL MEDICINE

## 2022-10-03 PROCEDURE — 85025 COMPLETE CBC W/AUTO DIFF WBC: CPT | Performed by: FAMILY MEDICINE

## 2022-10-03 PROCEDURE — 82962 GLUCOSE BLOOD TEST: CPT

## 2022-10-03 PROCEDURE — 25010000002 HEPARIN (PORCINE) 25000-0.45 UT/250ML-% SOLUTION: Performed by: FAMILY MEDICINE

## 2022-10-03 PROCEDURE — 84484 ASSAY OF TROPONIN QUANT: CPT

## 2022-10-03 PROCEDURE — 63710000001 INSULIN DETEMIR PER 5 UNITS: Performed by: INTERNAL MEDICINE

## 2022-10-03 PROCEDURE — 78452 HT MUSCLE IMAGE SPECT MULT: CPT

## 2022-10-03 PROCEDURE — 0 TECHNETIUM SESTAMIBI: Performed by: INTERNAL MEDICINE

## 2022-10-03 PROCEDURE — 85610 PROTHROMBIN TIME: CPT | Performed by: FAMILY MEDICINE

## 2022-10-03 PROCEDURE — 99233 SBSQ HOSP IP/OBS HIGH 50: CPT | Performed by: HOSPITALIST

## 2022-10-03 PROCEDURE — 93017 CV STRESS TEST TRACING ONLY: CPT

## 2022-10-03 PROCEDURE — 85520 HEPARIN ASSAY: CPT | Performed by: FAMILY MEDICINE

## 2022-10-03 PROCEDURE — 80048 BASIC METABOLIC PNL TOTAL CA: CPT | Performed by: INTERNAL MEDICINE

## 2022-10-03 PROCEDURE — 85730 THROMBOPLASTIN TIME PARTIAL: CPT | Performed by: FAMILY MEDICINE

## 2022-10-03 PROCEDURE — 25010000002 PIPERACILLIN SOD-TAZOBACTAM PER 1 G: Performed by: INTERNAL MEDICINE

## 2022-10-03 PROCEDURE — 25010000002 REGADENOSON 0.4 MG/5ML SOLUTION: Performed by: INTERNAL MEDICINE

## 2022-10-03 RX ORDER — BETHANECHOL CHLORIDE 10 MG/1
10 TABLET ORAL 3 TIMES DAILY
COMMUNITY
End: 2022-10-11 | Stop reason: HOSPADM

## 2022-10-03 RX ORDER — BUMETANIDE 0.5 MG/1
0.5 TABLET ORAL DAILY
COMMUNITY
End: 2022-10-11 | Stop reason: HOSPADM

## 2022-10-03 RX ORDER — ROSUVASTATIN CALCIUM 20 MG/1
20 TABLET, COATED ORAL DAILY
COMMUNITY
End: 2022-10-28

## 2022-10-03 RX ORDER — CAFFEINE CITRATE 20 MG/ML
60 SOLUTION INTRAVENOUS ONCE
Status: COMPLETED | OUTPATIENT
Start: 2022-10-03 | End: 2022-10-03

## 2022-10-03 RX ORDER — HEPARIN SODIUM 1000 [USP'U]/ML
4000 INJECTION, SOLUTION INTRAVENOUS; SUBCUTANEOUS AS NEEDED
Status: DISCONTINUED | OUTPATIENT
Start: 2022-10-03 | End: 2022-10-03 | Stop reason: DRUGHIGH

## 2022-10-03 RX ORDER — HEPARIN SODIUM 1000 [USP'U]/ML
2000 INJECTION, SOLUTION INTRAVENOUS; SUBCUTANEOUS AS NEEDED
Status: DISCONTINUED | OUTPATIENT
Start: 2022-10-03 | End: 2022-10-03 | Stop reason: DRUGHIGH

## 2022-10-03 RX ORDER — HEPARIN SODIUM 10000 [USP'U]/100ML
10 INJECTION, SOLUTION INTRAVENOUS
Status: DISCONTINUED | OUTPATIENT
Start: 2022-10-03 | End: 2022-10-03

## 2022-10-03 RX ORDER — AMLODIPINE BESYLATE 5 MG/1
5 TABLET ORAL DAILY
COMMUNITY
End: 2022-10-11 | Stop reason: HOSPADM

## 2022-10-03 RX ORDER — HEPARIN SODIUM 1000 [USP'U]/ML
4000 INJECTION, SOLUTION INTRAVENOUS; SUBCUTANEOUS ONCE
Status: DISCONTINUED | OUTPATIENT
Start: 2022-10-03 | End: 2022-10-03 | Stop reason: DRUGHIGH

## 2022-10-03 RX ORDER — BUMETANIDE 0.25 MG/ML
1 INJECTION INTRAMUSCULAR; INTRAVENOUS DAILY
Status: DISCONTINUED | OUTPATIENT
Start: 2022-10-03 | End: 2022-10-08

## 2022-10-03 RX ORDER — INSULIN DETEMIR 100 [IU]/ML
8 INJECTION, SOLUTION SUBCUTANEOUS NIGHTLY
COMMUNITY
End: 2022-10-11 | Stop reason: HOSPADM

## 2022-10-03 RX ADMIN — LINEZOLID 600 MG: 600 TABLET, FILM COATED ORAL at 20:31

## 2022-10-03 RX ADMIN — CAFFEINE CITRATE 60 MG: 20 INJECTION, SOLUTION INTRAVENOUS at 14:29

## 2022-10-03 RX ADMIN — BETHANECHOL CHLORIDE 10 MG: 10 TABLET ORAL at 17:57

## 2022-10-03 RX ADMIN — Medication 10 ML: at 20:31

## 2022-10-03 RX ADMIN — REGADENOSON 0.4 MG: 0.08 INJECTION, SOLUTION INTRAVENOUS at 14:22

## 2022-10-03 RX ADMIN — ATORVASTATIN CALCIUM 20 MG: 20 TABLET, FILM COATED ORAL at 20:31

## 2022-10-03 RX ADMIN — TAZOBACTAM SODIUM AND PIPERACILLIN SODIUM 3.38 G: 375; 3 INJECTION, SOLUTION INTRAVENOUS at 01:19

## 2022-10-03 RX ADMIN — HEPARIN SODIUM 10 UNITS/KG/HR: 10000 INJECTION, SOLUTION INTRAVENOUS at 00:39

## 2022-10-03 RX ADMIN — TAZOBACTAM SODIUM AND PIPERACILLIN SODIUM 3.38 G: 375; 3 INJECTION, SOLUTION INTRAVENOUS at 17:57

## 2022-10-03 RX ADMIN — INSULIN DETEMIR 10 UNITS: 100 INJECTION, SOLUTION SUBCUTANEOUS at 20:31

## 2022-10-03 RX ADMIN — SENNOSIDES AND DOCUSATE SODIUM 2 TABLET: 50; 8.6 TABLET ORAL at 08:18

## 2022-10-03 RX ADMIN — BUMETANIDE 1 MG: 0.25 INJECTION, SOLUTION INTRAMUSCULAR; INTRAVENOUS at 12:44

## 2022-10-03 RX ADMIN — LINEZOLID 600 MG: 600 TABLET, FILM COATED ORAL at 08:18

## 2022-10-03 RX ADMIN — SENNOSIDES AND DOCUSATE SODIUM 2 TABLET: 50; 8.6 TABLET ORAL at 20:31

## 2022-10-03 RX ADMIN — CARVEDILOL 3.12 MG: 3.12 TABLET, FILM COATED ORAL at 08:18

## 2022-10-03 RX ADMIN — TECHNETIUM TC 99M SESTAMIBI 1 DOSE: 1 INJECTION INTRAVENOUS at 11:55

## 2022-10-03 RX ADMIN — TAMSULOSIN HYDROCHLORIDE 0.4 MG: 0.4 CAPSULE ORAL at 08:17

## 2022-10-03 RX ADMIN — Medication 1 CAPSULE: at 08:17

## 2022-10-03 RX ADMIN — BETHANECHOL CHLORIDE 10 MG: 10 TABLET ORAL at 08:18

## 2022-10-03 RX ADMIN — TECHNETIUM TC 99M SESTAMIBI 1 DOSE: 1 INJECTION INTRAVENOUS at 14:20

## 2022-10-03 RX ADMIN — TAZOBACTAM SODIUM AND PIPERACILLIN SODIUM 3.38 G: 375; 3 INJECTION, SOLUTION INTRAVENOUS at 09:00

## 2022-10-03 RX ADMIN — FAMOTIDINE 20 MG: 20 TABLET ORAL at 08:17

## 2022-10-03 RX ADMIN — LEVOTHYROXINE SODIUM 75 MCG: 0.07 TABLET ORAL at 05:26

## 2022-10-03 RX ADMIN — BETHANECHOL CHLORIDE 10 MG: 10 TABLET ORAL at 20:31

## 2022-10-03 NOTE — CASE MANAGEMENT/SOCIAL WORK
Continued Stay Note  [facility]     Patient Name: Jermaine Singh  MRN: [id number]  Today's Date: 10/3/2022    Admit Date: 10/2/2022    Plan: CM note   Discharge Plan     Row Name 10/03/22 1603       Plan    Plan CM note    Plan Comments At this time goal remains for patient to return home with his spouse and [facility] health as he was prior to admission. PT and OT have been ordered - will follow for their recommendations- yariel [phone]               Discharge Codes    No documentation.                     Yariel Crenshaw RN

## 2022-10-03 NOTE — SIGNIFICANT NOTE
10/03/22  Increased trop to 0.129 at 2133 then 0.151 at 2205.   EKG noted ischemic changes in inferior and anterolateral leads.   We will start on heparin drip. Pt currently NPO for possible LHC in the am.   Electronically signed by Keira Hoffmann DO, 10/03/22, 12:15 AM EDT.

## 2022-10-03 NOTE — NURSING NOTE
WOC consult for right lower extremity, left foot recent amputation, buttocks.    Left great toe amputation performed in August by Dr. Márquez, almost fully approximated, remains small dry scabbed area towards distal incision.  No evidence of infection, patient's family states it was draining a few days ago but it was mainly just clear.    I placed 1 layer of Xeroform after cleansing with Betadine and then wrapped in dry gauze and stockinette.  Patient has follow-up appoint with Dr. Márquez in about 10 days.  Patient's family of concern could not get around without weightbearing.  I highly encouraged to follow Dr. Márquez nonweightbearing order and physical therapy has been consulted for recommendation on how to get around without bearing weight to this foot.  Patient's family also would like a new orthopedic boot if at all possible stating they feel like the old one is contaminated from when he was at [facility].    Right lower extremity with redness, no major warmth or drainage.  Patient's family states that the areas that were draining blisters and then clear fluid.  No evidence of any open areas upon my assessment.  Patient does have quite a lot of edema, could be candidate for compression wraps but did note previous DVT.    Buttocks is intact and blanchable except for a small area on the left side that is blistered and macerated but to me this reflects more of a friction and shearing injury than pressure.  I gave the family supplies to take care of this at home including a small Optifoam silicone pad as well as recommending continual usage of barrier cream.    WOC will continue to follow.

## 2022-10-03 NOTE — PROGRESS NOTES
"                                 [facility] Progress Note      LOS: 1 day   Patient Care Team:  Farooq Painter MD as PCP - General (Family Medicine)  Jermaine Hernandez MD as Consulting Physician (Cardiology)    Chief Complaint:    Chief Complaint   Patient presents with   • Shortness of Breath       Subjective     Interval History: Breathing improved    Patient Complaints: Complains of upper extremity weakness.  Wife states that he is getting depressed and anxious at home      Review of Systems:   A 14 point review of systems was negative except as was stated in the HPI      Objective     Vital Sign Min/Max for last 24 hours  Temp  Min: 97 °F (36.1 °C)  Max: 98.7 °F (37.1 °C)   BP  Min: 79/67  Max: 126/79   Pulse  Min: 66  Max: 103   Resp  Min: 16  Max: 20   SpO2  Min: 95 %  Max: 100 %   Flow (L/min)  Min: 2  Max: 4   No data recorded     Flowsheet Rows    Flowsheet Row First Filed Value   Admission Height 190.5 cm (75\") Documented at 10/02/2022 0306   Admission Weight 103 kg (227 lb) Documented at 10/02/2022 0306          Physical Exam:  General Appearance: Alert, appears stated age and cooperative  Lungs: Clear to auscultation  Heart:: RRR   No Murmurs, Rubs or Gallops  Abdomen: Soft and nontender with adequate bowel sounds.  No organomegaly  Extremities: Left foot bandaged.  Erythematous excoriations right lower leg  Pulses: Pulses palpable and equal bilaterally  Skin: Warm and dry with no rash  Psych: Normal     Results Review:     I reviewed the patient's new clinical results.  Results from last 7 days   Lab Units 10/02/22  0332   SODIUM mmol/L 140   POTASSIUM mmol/L 4.8   CHLORIDE mmol/L 103   CO2 mmol/L 24.0   BUN mg/dL 33*   CREATININE mg/dL 2.18*   GLUCOSE mg/dL 175*   CALCIUM mg/dL 9.2     Results from last 7 days   Lab Units 10/03/22  0038 10/02/22  0332   WBC 10*3/mm3 5.13 5.50   HEMOGLOBIN g/dL 9.7* 10.2*   HEMATOCRIT % 30.8* 32.4*   PLATELETS 10*3/mm3 131* 142     Lab Results   Lab " Value Date/Time    TROPONINT 0.151 (C) 10/02/2022 2205    TROPONINT 0.129 (C) 10/02/2022 2020    TROPONINT 0.107 (C) 10/02/2022 1435    TROPONINT 0.129 (C) 10/02/2022 0839    TROPONINT 0.139 (C) 10/02/2022 0332    TROPONINT 0.230 (C) 08/13/2022 2310    TROPONINT 0.229 (C) 08/13/2022 1224    TROPONINT 0.230 (C) 08/13/2022 0730    TROPONINT 0.206 (C) 08/12/2022 1653    TROPONINT 0.157 (C) 08/12/2022 1025    TROPONINT 0.146 (C) 08/11/2022 0636    TROPONINT 0.129 (C) 08/10/2022 2239    TROPONINT 0.108 (C) 08/10/2022 1638    TROPONINT 0.013 10/20/2021 1705    TROPONINT <0.010 10/20/2021 1508         Results from last 7 days   Lab Units 10/03/22  0038   INR  1.48*               Medication Review: yes  Current Facility-Administered Medications   Medication Dose Route Frequency Provider Last Rate Last Admin   • acetaminophen (TYLENOL) tablet 650 mg  650 mg Oral Q4H PRN Renetta Gatica II, DO        Or   • acetaminophen (TYLENOL) 160 MG/5ML solution 650 mg  650 mg Oral Q4H PRN Renetta Gatica II, DO        Or   • acetaminophen (TYLENOL) suppository 650 mg  650 mg Rectal Q4H PRN Renetta Gatica II, DO       • aspirin chewable tablet 81 mg  81 mg Oral Daily Renetta Gatica II, DO   81 mg at 10/02/22 1202   • atorvastatin (LIPITOR) tablet 20 mg  20 mg Oral Nightly Sarah Pineda APRN   20 mg at 10/02/22 2030   • bethanechol (URECHOLINE) tablet 10 mg  10 mg Oral TID Renetta Gatica II, DO   10 mg at 10/02/22 2030   • sennosides-docusate (PERICOLACE) 8.6-50 MG per tablet 2 tablet  2 tablet Oral BID Renetta Gatica II, DO   2 tablet at 10/02/22 2030    And   • polyethylene glycol (MIRALAX) packet 17 g  17 g Oral Daily PRN Renetta Gatica II, DO        And   • bisacodyl (DULCOLAX) EC tablet 5 mg  5 mg Oral Daily PRN Renetta Gatica II, DO        And   • bisacodyl (DULCOLAX) suppository 10 mg  10 mg Rectal Daily PRN Renetta Gatica II, DO       • carvedilol (COREG) tablet 3.125 mg  3.125 mg Oral BID With Meals En,  Renetta QUINTANA II, DO   3.125 mg at 10/02/22 1201   • dextrose (D50W) (25 g/50 mL) IV injection 25 g  25 g Intravenous Q15 Min PRN Renetta Gatica II, DO       • dextrose (GLUTOSE) oral gel 15 g  15 g Oral Q15 Min PRN EnRenetta M II, DO       • Diclofenac Sodium (VOLTAREN) 1 % gel 2 g  2 g Topical 4x Daily PRN EnRenetta II, DO       • famotidine (PEPCID) tablet 20 mg  20 mg Oral Daily BrattonRenetta M II, DO   20 mg at 10/02/22 1202   • glucagon (human recombinant) (GLUCAGEN DIAGNOSTIC) injection 1 mg  1 mg Intramuscular Q15 Min PRN EnRenetta M II, DO       • HYDROcodone-acetaminophen (NORCO) 5-325 MG per tablet 1 tablet  1 tablet Oral Q4H PRN Rneetta Gatica M II, DO       • Influenza Vac High-Dose Quad (FLUZONE HIGH DOSE) injection 0.7 mL  0.7 mL Intramuscular During Hospitalization Renetta Gatica II, DO       • insulin detemir (LEVEMIR) injection 10 Units  10 Units Subcutaneous Nightly EnRenetta M II, DO   10 Units at 10/02/22 2030   • Insulin Lispro (humaLOG) injection 0-9 Units  0-9 Units Subcutaneous TID AC EnRenetta M II, DO       • ipratropium-albuterol (DUO-NEB) nebulizer solution 3 mL  3 mL Nebulization Q4H PRN Renetta Gatica II, DO       • lactobacillus acidophilus (RISAQUAD) capsule 1 capsule  1 capsule Oral Daily EnRenetta M II, DO   1 capsule at 10/02/22 1201   • levothyroxine (SYNTHROID, LEVOTHROID) tablet 75 mcg  75 mcg Oral Q AM EnRenetta M II, DO   75 mcg at 10/03/22 0526   • linezolid (ZYVOX) tablet 600 mg  600 mg Oral Q12H EnRenetta M II, DO   600 mg at 10/02/22 2030   • Magnesium Sulfate 2 gram infusion - Mg less than or equal to 1.5 mg/dL  2 g Intravenous PRN Vibbert, Sarah M, APRN        Or   • Magnesium Sulfate 1 gram infusion - Mg 1.6-1.9 mg/dL  1 g Intravenous PRN Sarah Pineda APRN       • melatonin tablet 5 mg  5 mg Oral Nightly PRN Sarah Pineda APRN       • nitroglycerin (NITROSTAT) SL tablet 0.4 mg  0.4 mg Sublingual Q5 Min PRN Sarah Pineda,  APRN       • ondansetron (ZOFRAN) tablet 4 mg  4 mg Oral Q6H PRN En, Renetta M II, DO        Or   • ondansetron (ZOFRAN) injection 4 mg  4 mg Intravenous Q6H PRN En, Renetta M II, DO   4 mg at 10/02/22 1509   • Pharmacy Consult - MTM   Does not apply Daily Mya Han, PharmD       • Pharmacy to Dose Heparin   Does not apply Continuous PRN Keira Hoffmann, DO       • piperacillin-tazobactam (ZOSYN) 3.375 g in iso-osmotic dextrose 50 ml (premix)  3.375 g Intravenous Q8H En, Renetta M II, DO   3.375 g at 10/03/22 0119   • sodium chloride 0.9 % flush 10 mL  10 mL Intravenous PRN VibSarah waldrop M, APRN       • sodium chloride 0.9 % flush 10 mL  10 mL Intravenous Q12H VibSarah waldrop M, APRN   10 mL at 10/02/22 2029   • sodium chloride 0.9 % flush 10 mL  10 mL Intravenous PRN Edwin Sarah M, APRN       • sodium chloride 0.9 % flush 10 mL  10 mL Intravenous Q12H En, Renetta M II, DO       • sodium chloride 0.9 % flush 10 mL  10 mL Intravenous PRN En, Renetta M II, DO       • sodium chloride nasal spray 1 spray  1 spray Nasal PRN En, Renetta M II, DO       • tamsulosin (FLOMAX) 24 hr capsule 0.4 mg  0.4 mg Oral Daily En, Renetta M II, DO   0.4 mg at 10/02/22 1203     Facility-Administered Medications Ordered in Other Encounters   Medication Dose Route Frequency Provider Last Rate Last Admin   • Chlorhexidine Gluconate Cloth 2 % pads 1 application  1 application Topical Q12H PRN Mohan Arauz, PA             Type 2 diabetes mellitus (HCC)    Hypertension    Hyperlipidemia    Hypothyroidism (acquired)    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 Au    SSS (sick sinus syndrome) ([facility])    [place] (acute kidney injury) ([facility])    Acute on chronic HFrEF (heart failure with reduced ejection fraction) ([facility])    Elevated troponin    Cellulitis of right lower extremity        Impression      Systolic heart failure with subacute decompensation  Coronary artery disease with previous CABG February 2019  Nonspecific troponin  evaluation with no classic rise and fall  Sick sinus syndrome [employer] pacemaker  Peripheral vascular disease status post amputation left great toe August 2022  History of bilateral lower extremity DVT August 2022  Chronic kidney disease  Hypertension        Plan     Discontinue heparin  Lexiscan myocardial perfusion imaging today  Monitor blood pressure and renal function  Medical therapy limited by blood pressure  Pacemaker interrogation today  Resume Eliquis Tuesday pending stress testing    Cooper Apodaca MD   10/03/22  07:27 EDT

## 2022-10-03 NOTE — PROGRESS NOTES
[facility] Medicine Services  PROGRESS NOTE    Patient Name: Jermaine Singh  : 1945  MRN: [id number]    Date of Admission: 10/2/2022  Primary Care Physician: Farooq Painter MD    Subjective   Subjective     CC:  Shortness of Air    HPI:  Patient sitting up in bed with wife at bedside. He states he is feeling better than when he arrived. He understands the plan for pacer interrogation and stress test today. No new complaints, states he slept very well. Breathing is stable on 4L NC.    ROS:  Gen- No fevers, chills  CV- No chest pain, palpitations  Resp- No cough, +dyspnea  GI- No N/V/D, abd pain    Objective   Objective     Vital Signs:   Temp:  [97 °F (36.1 °C)-98.7 °F (37.1 °C)] 98.1 °F (36.7 °C)  Heart Rate:  [] 72  Resp:  [16-20] 18  BP: ()/(67-83) 104/71  Flow (L/min):  [2-4] 4     Physical Exam:  Constitutional: No acute distress, awake, alert  HENT: NCAT, mucous membranes moist  Respiratory: Clear to auscultation bilaterally, respiratory effort normal on 4L NC  Cardiovascular: RRR, no murmurs, rubs, or gallops  Gastrointestinal: Positive bowel sounds, soft, nontender, nondistended  Musculoskeletal: BLE edema. RLE w/ redness/warmth from ankle to knee. Left toe amputation dressed.  Psychiatric: Appropriate affect, cooperative  Neurologic: Oriented x 3, strength symmetric in all extremities, Cranial Nerves grossly intact to confrontation, speech clear  Skin: No rashes    Results Reviewed:  LAB RESULTS:      Lab 10/03/22  0038 10/02/22  0839 10/02/22  0332   WBC 5.13  --  5.50   HEMOGLOBIN 9.7*  --  10.2*   HEMATOCRIT 30.8*  --  32.4*   PLATELETS 131*  --  142   NEUTROS ABS 3.22  --  3.79   IMMATURE GRANS (ABS) 0.01  --  0.01   LYMPHS ABS 1.25  --  0.98   MONOS ABS 0.44  --  0.57   EOS ABS 0.15  --  0.11   MCV 93.6  --  93.4   SED RATE  --   --  6   CRP  --   --  0.40   PROCALCITONIN  --  0.08  --    LACTATE  --   --  1.9   PROTIME 17.9*  --   --    APTT 42.3*  --    --    HEPARIN ANTI-XA >1.10*  --   --          Lab 10/02/22  0839 10/02/22  0332   SODIUM  --  140   POTASSIUM  --  4.8   CHLORIDE  --  103   CO2  --  24.0   ANION GAP  --  13.0   BUN  --  33*   CREATININE  --  2.18*   EGFR  --  30.4*   GLUCOSE  --  175*   CALCIUM  --  9.2   HEMOGLOBIN A1C 7.20*  --          Lab 10/02/22  0332   TOTAL PROTEIN 6.1   ALBUMIN 3.80   GLOBULIN 2.3   ALT (SGPT) 16   AST (SGOT) 18   BILIRUBIN 0.2   ALK PHOS 85         Lab 10/03/22  0038 10/02/22  2205 10/02/22  2020 10/02/22  1435 10/02/22  0839 10/02/22  0332   PROBNP  --   --   --   --   --  25,781.0*   TROPONIN T  --  0.151* 0.129* 0.107* 0.129* 0.139*   PROTIME 17.9*  --   --   --   --   --    INR 1.48*  --   --   --   --   --                  Brief Urine Lab Results  (Last result in the past 365 days)      Color   Clarity   Blood   Leuk Est   Nitrite   Protein   CREAT   Urine HCG        08/23/22 1309             59.2         08/23/22 1309 [place]   Cloudy   Large (3+)   Trace   Negative   30 mg/dL (1+)                 Microbiology Results Abnormal     Procedure Component Value - Date/Time    Blood Culture - Blood, Arm, Left [[id number]]  (Normal) Collected: 10/02/22 0433    Lab Status: Preliminary result Specimen: Blood from Arm, Left Updated: 10/03/22 0647     Blood Culture No growth at 24 hours    Blood Culture - Blood, Arm, Left [[id number]]  (Normal) Collected: 10/02/22 0433    Lab Status: Preliminary result Specimen: Blood from Arm, Left Updated: 10/03/22 0647     Blood Culture No growth at 24 hours          XR Chest 1 View    Result Date: 10/2/2022  FRONTAL VIEW OF THE CHEST CLINICAL INDICATION: Dyspnea COMPARISON: 8/18/2022. FINDINGS: Stable heart size. Stable postsurgical changes, implantable device and leads. Worsening opacities are seen in the right midlung. Small right pleural effusion. No pneumothorax.     Impression: 1. Small right pleural effusion. 2. Right basilar opacities could reflect atelectasis or pneumonia.  Electronically signed by:  Eyad Jones M.D.  10/2/2022 4:25 AM Mountain Time      Results for orders placed during the hospital encounter of 07/27/22    Adult Transthoracic Echo Complete W/ Cont if Necessary Per Protocol    Interpretation Summary  · Left ventricular ejection fraction appears to be 46 - 50%. Left ventricular systolic function is low normal.  · Left ventricular septal hypokinesis  · No significant valvular heart disease      I have reviewed the medications:  Scheduled Meds:aspirin, 81 mg, Oral, Daily  atorvastatin, 20 mg, Oral, Nightly  bethanechol, 10 mg, Oral, TID  carvedilol, 3.125 mg, Oral, BID With Meals  famotidine, 20 mg, Oral, Daily  insulin detemir, 10 Units, Subcutaneous, Nightly  insulin lispro, 0-9 Units, Subcutaneous, TID AC  lactobacillus acidophilus, 1 capsule, Oral, Daily  levothyroxine, 75 mcg, Oral, Q AM  linezolid, 600 mg, Oral, Q12H  pharmacy consult - [place], , Does not apply, Daily  piperacillin-tazobactam, 3.375 g, Intravenous, Q8H  senna-docusate sodium, 2 tablet, Oral, BID  sodium chloride, 10 mL, Intravenous, Q12H  sodium chloride, 10 mL, Intravenous, Q12H  tamsulosin, 0.4 mg, Oral, Daily      Continuous Infusions:   PRN Meds:.•  acetaminophen **OR** acetaminophen **OR** acetaminophen  •  senna-docusate sodium **AND** polyethylene glycol **AND** bisacodyl **AND** bisacodyl  •  dextrose  •  dextrose  •  Diclofenac Sodium  •  glucagon (human recombinant)  •  HYDROcodone-acetaminophen  •  influenza vaccine  •  ipratropium-albuterol  •  magnesium sulfate **OR** magnesium sulfate in D5W 1g/100mL (PREMIX)  •  melatonin  •  nitroglycerin  •  ondansetron **OR** ondansetron  •  sodium chloride  •  sodium chloride  •  sodium chloride  •  sodium chloride    Assessment & Plan   Assessment & Plan     Active Hospital Problems    Diagnosis  POA   • Elevated troponin [R77.8]  Yes   • Cellulitis of right lower extremity [L03.115]  Yes   • [place] (acute kidney injury) (HCC) [N17.9]  Yes   • Acute  on chronic HFrEF (heart failure with reduced ejection fraction) ([facility]) [I50.23]  Yes   • SSS (sick sinus syndrome) ([facility]) [I49.5]  Yes   • Type 2 diabetes mellitus ([facility]) [E11.9]  Yes   • Hypertension [I10]  Yes   • Hyperlipidemia [E78.5]  Yes   • Hypothyroidism (acquired) [E03.9]  Yes   • 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 Au [Z95.1]  Not Applicable      Resolved Hospital Problems    Diagnosis Date Resolved POA   • Angina at rest ([facility]) [I20.8] 10/02/2022 Yes        Brief Hospital Course to date:  Jermaine Singh is a 76 y/o male s/p recent toe amputation by Dr. Márquez presented to the ED with acute onset SOA/chest heaviness.    This patient's problems and plans were partially entered by my partner and updated as appropriate by me 10/03/22.    All problems are new to me today.     Hypoxia  A/C HFmrEF  Right pleural effusion  Elevated troponin  --Start IV bumex BID through the day and assess response. Stop his norvasc indefinitely so as not to worsen his LE edema and start Coreg. No ace/arb for now given his renal failure.  --Patient requests cardiology consult, follows with Dr. Apodaca.  --Elevated troponin is likely due to CHF in setting of his renal failure. No CP at this time and his troponin trended down  -- D/c heparin per Dr. Apodaca  -- Lexiscan myocardial perfusion imaging today  -- pacemaker interrogation today  -- resume Eliquis on Tuesday pending stress test  -- continue atorvastatin and carvedilol      RLE cellulitis  Recent Left toe amputation  --Previously on bactrim w/out improvement. Start IV vancomycin/zosyn. Have taken picture and uploaded into [employer].  --PT wound care.     [place]  --Suspect mostly due to Bactrim use. Stop that med and watch creatinine closely with BID diuresis.      DM2 w/ hyperglycemia  --Basal bolus insulin w/ SSI.    Expected Discharge Location and Transportation: home  Expected Discharge Date: 10/4/22    DVT prophylaxis:  No DVT prophylaxis order currently exists.     AM-PAC 6  Clicks Score (PT): 20 (10/02/22 2000)    CODE STATUS:   Code Status and Medical Interventions:   Ordered at: 10/02/22 0949     Code Status (Patient has no pulse and is not breathing):    CPR (Attempt to Resuscitate)     Medical Interventions (Patient has pulse or is breathing):    Full Support       Jacinta Daniel DO  10/03/22

## 2022-10-03 NOTE — PLAN OF CARE
Problem: Adult Inpatient Plan of Care  Goal: Absence of Hospital-Acquired Illness or Injury  Intervention: Identify and Manage Fall Risk  Description: Perform standard risk assessment on admission using a validated tool or comprehensive approach appropriate to the patient; reassess fall risk frequently, with change in status or transfer to another level of care.  Communicate fall injury risk to interprofessional healthcare team.  Determine need for increased observation, equipment and environmental modification, such as low bed, signage and supportive, nonskid footwear.  Adjust safety measures to individual developmental age, stage and identified risk factors.  Reinforce the importance of safety and physical activity with patient and family.  Perform regular intentional rounding to assess need for position change, pain assessment and personal needs, including assistance with toileting.  Recent Flowsheet Documentation  Taken 10/3/2022 0000 by Jaison Castillo, RN  Safety Promotion/Fall Prevention:   activity supervised   assistive device/personal items within reach   clutter free environment maintained   elopement precautions   fall prevention program maintained   nonskid shoes/slippers when out of bed   safety round/check completed  Taken 10/2/2022 2200 by Jaison Castillo, RN  Safety Promotion/Fall Prevention:   activity supervised   assistive device/personal items within reach   elopement precautions   clutter free environment maintained   fall prevention program maintained   nonskid shoes/slippers when out of bed   safety round/check completed  Taken 10/2/2022 2000 by Jaison Castillo, RN  Safety Promotion/Fall Prevention:   assistive device/personal items within reach   activity supervised   clutter free environment maintained   elopement precautions   fall prevention program maintained   nonskid shoes/slippers when out of bed   safety round/check completed  Intervention: Prevent Skin Injury  Description: Perform a screening  for skin injury risk, such as pressure or moisture associated skin damage on admission and at regular intervals throughout hospital stay.  Keep all areas of skin (especially folds) clean and dry.  Maintain adequate skin hydration.  Relieve and redistribute pressure and protect bony prominences; implement measures based on patient-specific risk factors.  Match turning and repositioning schedule to clinical condition.  Encourage weight shift frequently; assist with reposition if unable to complete independently.  Float heels off bed; avoid pressure on the Achilles tendon.  Keep skin free from extended contact with medical devices.  Encourage functional activity and mobility, as early as tolerated.  Use aids (e.g., slide boards, mechanical lift) during transfer.  Recent Flowsheet Documentation  Taken 10/3/2022 0000 by Jaison Castillo RN  Body Position: position changed independently  Skin Protection:   adhesive use limited   incontinence pads utilized   protective footwear used   skin sealant/moisture barrier applied   skin-to-skin areas padded   tubing/devices free from skin contact  Taken 10/2/2022 2200 by Jaison Castillo RN  Body Position: position changed independently  Skin Protection:   adhesive use limited   incontinence pads utilized   protective footwear used   skin sealant/moisture barrier applied   skin-to-skin areas padded   tubing/devices free from skin contact  Taken 10/2/2022 2000 by Jaison Castillo RN  Body Position: position changed independently  Skin Protection:   adhesive use limited   incontinence pads utilized   protective footwear used   skin sealant/moisture barrier applied   skin-to-skin areas padded   tubing/devices free from skin contact  Intervention: Prevent and Manage VTE (Venous Thromboembolism) Risk  Description: Assess for VTE (venous thromboembolism) risk.  Encourage and assist with early ambulation.  Initiate and maintain compression or other therapy, as indicated, based on identified risk in  accordance with organizational protocol and provider order.  Encourage both active and passive leg exercises while in bed, if unable to ambulate.  Recent Flowsheet Documentation  Taken 10/3/2022 0000 by Jaison Castillo RN  Activity Management:   activity adjusted per tolerance   dorsiflexion/plantar flexion performed  Range of Motion: active ROM (range of motion) encouraged  Taken 10/2/2022 2200 by Jaison Castillo RN  Activity Management:   activity adjusted per tolerance   dorsiflexion/plantar flexion performed  Taken 10/2/2022 2000 by Jaison Castillo RN  Activity Management:   activity adjusted per tolerance   dorsiflexion/plantar flexion performed  Range of Motion: ROM (range of motion) performed  Intervention: Prevent Infection  Description: Maintain skin and mucous membrane integrity; promote hand, oral and pulmonary hygiene.  Optimize fluid balance, nutrition, sleep and glycemic control to maximize infection resistance.  Identify potential sources of infection early to prevent or mitigate progression of infection (e.g., wound, lines, devices).  Evaluate ongoing need for invasive devices; remove promptly when no longer indicated.  Recent Flowsheet Documentation  Taken 10/2/2022 2000 by Jaison Castillo RN  Infection Prevention:   environmental surveillance performed   hand hygiene promoted   rest/sleep promoted   single patient room provided  Goal: Optimal Comfort and Wellbeing  Intervention: Provide Person-Centered Care  Description: Use a family-focused approach to care.  Develop trust and rapport by proactively providing information, encouraging questions, addressing concerns and offering reassurance.  Acknowledge emotional response to hospitalization.  Recognize and utilize personal coping strategies.  Honor spiritual and cultural preferences.  Recent Flowsheet Documentation  Taken 10/2/2022 2000 by Jaison Castillo RN  Trust Relationship/Rapport:   care explained   emotional support provided   choices provided   questions  answered   empathic listening provided   questions encouraged   reassurance provided   thoughts/feelings acknowledged     Problem: Fall Injury Risk  Goal: Absence of Fall and Fall-Related Injury  Outcome: Ongoing, Progressing  Intervention: Identify and Manage Contributors  Description: Develop a fall prevention plan with the patient and caregiver/family.  Provide reorientation, appropriate sensory stimulation and routines with changes in mental status to decrease risk of fall.  Promote use of personal vision and auditory aids.  Assess assistance level required for safe and effective self-care; provide support as needed, such as toileting, mobilization. For age 65 and older, implement timed toileting with assistance.  Encourage physical activity, such as performance of mobility and self-care at highest level of patient ability, multicomponent exercise program and provision of appropriate assistive devices.  If fall occurs, assess the severity of injury; implement fall injury protocol. Determine the cause and revise fall injury prevention plan.  Regularly review medication contribution to fall risk; adjust medication administration times to minimize risk of falling.  Consider risk related to polypharmacy and age.  Balance adequate pain management with potential for oversedation.  Flowsheets  Taken 10/3/2022 0052  Self-Care Promotion:   independence encouraged   BADL personal objects within reach  Taken 10/2/2022 2000  Medication Review/Management:   medications reviewed   high-risk medications identified  Intervention: Promote Injury-Free Environment  Description: Provide a safe, barrier-free environment that encourages independent activity.  Keep care area uncluttered and well-lighted.  Determine need for increased observation or monitoring.  Avoid use of devices that minimize mobility, such as restraints or indwelling urinary catheter.  Flowsheets  Taken 10/3/2022 0000  Safety Promotion/Fall Prevention:   activity  supervised   assistive device/personal items within reach   clutter free environment maintained   elopement precautions   fall prevention program maintained   nonskid shoes/slippers when out of bed   safety round/check completed  Taken 10/2/2022 2200  Safety Promotion/Fall Prevention:   activity supervised   assistive device/personal items within reach   elopement precautions   clutter free environment maintained   fall prevention program maintained   nonskid shoes/slippers when out of bed   safety round/check completed  Taken 10/2/2022 2000  Safety Promotion/Fall Prevention:   assistive device/personal items within reach   activity supervised   clutter free environment maintained   elopement precautions   fall prevention program maintained   nonskid shoes/slippers when out of bed   safety round/check completed     Problem: Asthma Comorbidity  Goal: Maintenance of Asthma Control  Intervention: Maintain Asthma Symptom Control  Description: Evaluate adherence to self-management (asthma action plan), such as medication, symptom-control, trigger-avoidance and self-monitoring.  Advocate for continuation of home regimen, including medication, method of delivery, schedule and symptom monitoring; acknowledge preferred modality and routine.  Minimize exposure to potential triggers, such as perfume, cleaning chemicals and all types of smoke.  Assess for proper use of inhaled medication and delivery technique; assist or reinstruct if needed.  Evaluate effectiveness of coping skills; encourage expression of feelings, expectations and concerns related to disease management and quality of life; reinforce education to enhance management plan and wellbeing.  Recent Flowsheet Documentation  Taken 10/2/2022 2000 by Jaison Castillo, RN  Medication Review/Management:   medications reviewed   high-risk medications identified     Problem: Behavioral Health Comorbidity  Goal: Maintenance of Behavioral Health Symptom Control  Intervention:  Maintain Behavioral Health Symptom Control  Description: Confirm mental health diagnosis and current treatment.  Evaluate adherence to previously identified self-management plan.  Advocate continuation of home strategies, including medication.  Evaluate effectiveness of self-management strategies and coping skills.  Communicate with providers to ensure continuity and follow-up at transition.  Recent Flowsheet Documentation  Taken 10/2/2022 2000 by Jaison Castillo RN  Medication Review/Management:   medications reviewed   high-risk medications identified     Problem: COPD (Chronic Obstructive Pulmonary Disease) Comorbidity  Goal: Maintenance of COPD Symptom Control  Intervention: Maintain COPD-Symptom Control  Description: Evaluate adherence to management plan (e.g., medication, trigger avoidance, infection prevention, self-monitoring).  Advocate for continuation of home regimen, including medication, method of delivery, schedule and symptom monitoring.  Anticipate the need for breathing techniques and activity pacing to minimize fatigue and breathlessness.  Assess for proper use of inhaled medication and delivery technique; assist or reinstruct if needed.  Evaluate effectiveness of coping skills; encourage expression of feelings, expectations and concerns related to disease management and quality of life; reinforce education to enhance management plan and wellbeing.  Recent Flowsheet Documentation  Taken 10/2/2022 2000 by Jaison Castillo RN  Supportive Measures:   active listening utilized   positive reinforcement provided   problem-solving facilitated   relaxation techniques promoted   self-care encouraged   self-reflection promoted   self-responsibility promoted   verbalization of feelings encouraged  Medication Review/Management:   medications reviewed   high-risk medications identified     Problem: Diabetes Comorbidity  Goal: Blood Glucose Level Within Targeted Range  Intervention: Monitor and Manage  Glycemia  Description: Establish target blood glucose levels based on patient-specific factors, such as age, diabetes-related complications and illness severity.  Document blood glucose levels and monitor trend; advocate for adjustment to keep within targeted range.  Provide pharmacologic therapy to maintain blood glucose levels within targeted range.  Check blood glucose level if there is a change in mental or cognitive status.  Recognize, treat and document hypoglycemia event and potential cause.  Avoid hypoglycemic episodes by advocating for insulin dose adjustment when there is a change in condition, such as illness severity, decreased oral intake, missed or refused meals and snacks, as well as medication change that may include steroid tape  Recent Flowsheet Documentation  Taken 10/2/2022 2000 by Jaison Castillo RN  Glycemic Management: blood glucose monitored     Problem: Heart Failure Comorbidity  Goal: Maintenance of Heart Failure Symptom Control  Intervention: Maintain Heart Failure-Management  Description: Evaluate adherence to home heart failure self-care regimen (e.g., medication, fluid balance, sodium intake, daily weight, physical activity, telemonitoring, support).  Advocate continuation of home medication and schedule.  Consider pharmacologic therapy administration time and effects (e.g., avoid giving diuretic prior to bedtime or nitrates on empty stomach).  Monitor response to pharmacologic therapy, including weight fluctuations, blood pressure and electrolyte levels.  Monitor for signs and symptoms of anxiety and depression, including severity and duration; if present, provide psychosocial support.  Consider need for heart failure clinic or palliative care consult.  Recent Flowsheet Documentation  Taken 10/2/2022 2000 by Jaison Castillo, RN  Medication Review/Management:   medications reviewed   high-risk medications identified     Problem: Hypertension Comorbidity  Goal: Blood Pressure in Desired  Range  Intervention: Maintain Blood Pressure Management  Description: Evaluate adherence to home antihypertensive regimen (e.g., exercise and activity, diet modification, medication).  Provide scheduled antihypertensive medication; consider administration time and effects (e.g., avoid giving diuretic prior to bedtime).  Monitor response to antihypertensive medication therapy (e.g., blood pressure, electrolyte levels, medication effects).  Minimize risk of orthostatic hypotension; encourage caution with position changes, particularly if elderly.  Recent Flowsheet Documentation  Taken 10/2/2022 2000 by Jaison Castillo RN  Medication Review/Management:   medications reviewed   high-risk medications identified     Problem: Osteoarthritis Comorbidity  Goal: Maintenance of Osteoarthritis Symptom Control  Intervention: Maintain Osteoarthritis Symptom Control  Description: Evaluate adherence to self-management plan, such as medication, exercise and weight management.  Advocate for continuation of home regimen, such as medication, physical activity and thermal agents; monitor response.  Encourage participation in functional activities, such as mobility and ADLs (activities of daily living) to minimize decline associated with inactivity.  Facilitate use of patient-specific assistive devices, equipment or orthoses.  Evaluate effectiveness of coping skills; encourage expression of feelings, expectations and concerns related to disease management and quality of life; reinforce education to enhance management plan and wellbeing.  Recent Flowsheet Documentation  Taken 10/3/2022 0000 by Jaison Castillo, RN  Activity Management:   activity adjusted per tolerance   dorsiflexion/plantar flexion performed  Taken 10/2/2022 2200 by Jaison Castillo RN  Activity Management:   activity adjusted per tolerance   dorsiflexion/plantar flexion performed  Taken 10/2/2022 2000 by Jaison Castillo, RN  Activity Management:   activity adjusted per tolerance    dorsiflexion/plantar flexion performed  Medication Review/Management:   medications reviewed   high-risk medications identified     Problem: Pain Chronic (Persistent) (Comorbidity Management)  Goal: Acceptable Pain Control and Functional Ability  Intervention: Manage Persistent Pain  Description: Evaluate pain level, effect of treatment and patient response at regular intervals.  Minimize pain stimuli; coordinate care and adjust environment (e.g., light, noise, unnecessary movement); promote sleep/rest.  Match pharmacologic analgesia to severity and type of pain mechanism (e.g., neuropathic, muscle, inflammatory); consider multimodal approach (e.g., nonopioid, opioid, adjuvant).  Provide medication at regular intervals; titrate to patient response.  Manage breakthrough pain with additional doses; consider rotation or switching medication.  Monitor for signs of substance tolerance (increased dose to reach desired effect, decreased effect with same dose).  Avoid abrupt withdrawal of medication, especially agents capable of causing physical dependence.  Manage medication-induced effects, such as constipation, nausea, pruritus, urinary retention, somnolence and dizziness.  Provide multimodal treatment interventions, such as physical activity, therapeutic exercise, yoga, TENS (transcutaneous electrical nerve stimulation) and manual therapy.  Train in functional activity modifications, such as body mechanics, posture, ergonomics, energy conservation and activity pacing.  Consider addition of complementary or alternative therapy, such as acupuncture, hypnosis or therapeutic touch.  Recent Flowsheet Documentation  Taken 10/2/2022 2000 by Jaison Castillo RN  Medication Review/Management:   medications reviewed   high-risk medications identified  Intervention: Optimize Psychosocial Wellbeing  Description: Facilitate patient’s self-control over pain by providing pain information and allowing choices in treatment.  Consider and  address emotional response to pain.  Explore and promote use of coping strategies; address barriers to successful coping.  Evaluate and assist with psychosocial, cultural and spiritual factors impacting pain.  Modify pain perception by using techniques, such as distraction, mindfulness, guided imagery, meditation or music.  Assess and monitor for signs and symptoms of behavioral health concerns, such as unhealthy substance use, depression and suicidal ideation.  Consider referral for ongoing coping support, such as cognitive behavioral therapy and mindfulness-based stress reduction.  Recent Flowsheet Documentation  Taken 10/2/2022 2000 by Jaison Castillo RN  Supportive Measures:   active listening utilized   positive reinforcement provided   problem-solving facilitated   relaxation techniques promoted   self-care encouraged   self-reflection promoted   self-responsibility promoted   verbalization of feelings encouraged  Diversional Activities:   television   smartphone  Family/Support System Care:   caregiver stress acknowledged   involvement promoted   presence promoted   self-care encouraged   support provided     Problem: Skin Injury Risk Increased  Goal: Skin Health and Integrity  Outcome: Ongoing, Progressing  Intervention: Promote and Optimize Oral Intake  Description: Perform a nutrition assessment; include a nutrition-focused physical exam.  Determine calorie, protein, vitamin, mineral and fluid requirements.  Assess for micronutrient deficiencies; supplement if depleted.  Assess need and assist with meal set-up and feeding.  Adjust diet or meal schedule based on preferences and tolerance.  Offer oral supplemental food or drinks to enhance calorie and protein intake.  Establish bowel elimination program to increase comfort and appetite.  Minimize unnecessary dietary restrictions to increase oral intake.  Provide and encourage oral hygiene to enhance desire to eat.  Consider enteral nutrition support if oral  intake remains inadequate; provide parenteral nutrition if enteral is contraindicated.  Flowsheets (Taken 10/3/2022 0052)  Oral Nutrition Promotion:   physical activity promoted   rest periods promoted  Intervention: Optimize Skin Protection  Description: Perform a full pressure injury risk assessment, as indicated by screening, upon admission to care unit.  Reassess skin (injury risk, full inspection) frequently (e.g., scheduled interval, with change in condition) to provide optimal early detection and prevention.  Maintain adequate tissue perfusion (e.g., encourage fluid balance; avoid crossing legs, constrictive clothing or devices) to promote tissue oxygenation.  Maintain head of bed at lowest degree of elevation tolerated, considering medical condition and other restrictions.  Avoid positioning onto an area that remains reddened.  Minimize incontinence and moisture (e.g., toileting schedule; moisture-wicking pad, diaper or incontinence collection device; skin moisture barrier).  Cleanse skin promptly and gently when soiled utilizing a pH-balanced cleanser.  Relieve and redistribute pressure (e.g., scheduled position changes, weight shifts, use of support surface, medical device repositioning, protective dressing application, use of positioning device, microclimate control, use of pressure-injury-monitor  Encourage increased activity, such as sitting in a chair at the bedside or early mobilization, when able to tolerate.  Flowsheets  Taken 10/3/2022 0000  Pressure Reduction Techniques:   frequent weight shift encouraged   heels elevated off bed   positioned off wounds   pressure points protected   weight shift assistance provided  Head of Bed (HOB) Positioning: HOB elevated  Pressure Reduction Devices:   pressure-redistributing mattress utilized   positioning supports utilized   heel offloading device utilized  Skin Protection:   adhesive use limited   incontinence pads utilized   protective footwear used   skin  sealant/moisture barrier applied   skin-to-skin areas padded   tubing/devices free from skin contact  Taken 10/2/2022 2200  Pressure Reduction Techniques:   frequent weight shift encouraged   heels elevated off bed   positioned off wounds   pressure points protected   weight shift assistance provided  Head of Bed ([facility]) Positioning: [facility] elevated  Pressure Reduction Devices:   pressure-redistributing mattress utilized   positioning supports utilized   heel offloading device utilized  Skin Protection:   adhesive use limited   incontinence pads utilized   protective footwear used   skin sealant/moisture barrier applied   skin-to-skin areas padded   tubing/devices free from skin contact  Taken 10/2/2022 2000  Pressure Reduction Techniques:   frequent weight shift encouraged   heels elevated off bed   positioned off wounds   pressure points protected   weight shift assistance provided  Head of Bed (HOB) Positioning: [facility] elevated  Pressure Reduction Devices:   pressure-redistributing mattress utilized   positioning supports utilized   heel offloading device utilized  Skin Protection:   adhesive use limited   incontinence pads utilized   protective footwear used   skin sealant/moisture barrier applied   skin-to-skin areas padded   tubing/devices free from skin contact   Goal Outcome Evaluation:

## 2022-10-04 LAB
ALBUMIN SERPL-MCNC: 3.7 G/DL (ref 3.5–5.2)
ALBUMIN/GLOB SERPL: 1.8 G/DL
ALP SERPL-CCNC: 79 U/L (ref 39–117)
ALT SERPL W P-5'-P-CCNC: 13 U/L (ref 1–41)
ANION GAP SERPL CALCULATED.3IONS-SCNC: 11 MMOL/L (ref 5–15)
AST SERPL-CCNC: 10 U/L (ref 1–40)
BH CV REST NUCLEAR ISOTOPE DOSE: 9.9 MCI
BH CV STRESS BP STAGE 2: NORMAL
BH CV STRESS BP STAGE 4: NORMAL
BH CV STRESS COMMENTS STAGE 1: NORMAL
BH CV STRESS DOSE REGADENOSON STAGE 1: 0.4
BH CV STRESS DURATION MIN STAGE 1: 1
BH CV STRESS DURATION MIN STAGE 2: 1
BH CV STRESS DURATION MIN STAGE 3: 1
BH CV STRESS DURATION MIN STAGE 4: 1
BH CV STRESS DURATION SEC STAGE 1: 0
BH CV STRESS DURATION SEC STAGE 2: 0
BH CV STRESS DURATION SEC STAGE 3: 0
BH CV STRESS DURATION SEC STAGE 4: 0
BH CV STRESS HR STAGE 1: 65
BH CV STRESS HR STAGE 2: 66
BH CV STRESS HR STAGE 3: 67
BH CV STRESS HR STAGE 4: 67
BH CV STRESS NUCLEAR ISOTOPE DOSE: 33 MCI
BH CV STRESS O2 STAGE 1: 98
BH CV STRESS O2 STAGE 2: 98
BH CV STRESS O2 STAGE 3: 97
BH CV STRESS O2 STAGE 4: 97
BH CV STRESS PROTOCOL 1: NORMAL
BH CV STRESS RECOVERY BP: NORMAL MMHG
BH CV STRESS RECOVERY HR: 67 BPM
BH CV STRESS RECOVERY O2: 97 %
BH CV STRESS STAGE 1: 1
BH CV STRESS STAGE 2: 2
BH CV STRESS STAGE 3: 3
BH CV STRESS STAGE 4: 4
BILIRUB SERPL-MCNC: 0.2 MG/DL (ref 0–1.2)
BUN SERPL-MCNC: 33 MG/DL (ref 8–23)
BUN/CREAT SERPL: 15.5 (ref 7–25)
CALCIUM SPEC-SCNC: 9.1 MG/DL (ref 8.6–10.5)
CHLORIDE SERPL-SCNC: 104 MMOL/L (ref 98–107)
CO2 SERPL-SCNC: 26 MMOL/L (ref 22–29)
CREAT SERPL-MCNC: 2.13 MG/DL (ref 0.76–1.27)
DEPRECATED RDW RBC AUTO: 47.7 FL (ref 37–54)
EGFRCR SERPLBLD CKD-EPI 2021: 31.3 ML/MIN/1.73
ERYTHROCYTE [DISTWIDTH] IN BLOOD BY AUTOMATED COUNT: 14 % (ref 12.3–15.4)
GLOBULIN UR ELPH-MCNC: 2.1 GM/DL
GLUCOSE BLDC GLUCOMTR-MCNC: 120 MG/DL (ref 70–130)
GLUCOSE BLDC GLUCOMTR-MCNC: 122 MG/DL (ref 70–130)
GLUCOSE BLDC GLUCOMTR-MCNC: 151 MG/DL (ref 70–130)
GLUCOSE BLDC GLUCOMTR-MCNC: 169 MG/DL (ref 70–130)
GLUCOSE SERPL-MCNC: 133 MG/DL (ref 65–99)
HCT VFR BLD AUTO: 32.9 % (ref 37.5–51)
HGB BLD-MCNC: 10.4 G/DL (ref 13–17.7)
LV EF NUC BP: 42 %
MAXIMAL PREDICTED HEART RATE: 143 BPM
MCH RBC QN AUTO: 29.5 PG (ref 26.6–33)
MCHC RBC AUTO-ENTMCNC: 31.6 G/DL (ref 31.5–35.7)
MCV RBC AUTO: 93.2 FL (ref 79–97)
PERCENT MAX PREDICTED HR: 47.55 %
PLATELET # BLD AUTO: 153 10*3/MM3 (ref 140–450)
PMV BLD AUTO: 12.3 FL (ref 6–12)
POTASSIUM SERPL-SCNC: 5.1 MMOL/L (ref 3.5–5.2)
PROT SERPL-MCNC: 5.8 G/DL (ref 6–8.5)
QT INTERVAL: 400 MS
QTC INTERVAL: 492 MS
RBC # BLD AUTO: 3.53 10*6/MM3 (ref 4.14–5.8)
SODIUM SERPL-SCNC: 141 MMOL/L (ref 136–145)
STRESS BASELINE BP: NORMAL MMHG
STRESS BASELINE HR: 65 BPM
STRESS O2 SAT REST: 99 %
STRESS PERCENT HR: 56 %
STRESS POST ESTIMATED WORKLOAD: 1 METS
STRESS POST EXERCISE DUR MIN: 4 MIN
STRESS POST EXERCISE DUR SEC: 0 SEC
STRESS POST O2 SAT PEAK: 97 %
STRESS POST PEAK BP: NORMAL MMHG
STRESS POST PEAK HR: 68 BPM
STRESS TARGET HR: 122 BPM
WBC NRBC COR # BLD: 5.65 10*3/MM3 (ref 3.4–10.8)

## 2022-10-04 PROCEDURE — 63710000001 INSULIN DETEMIR PER 5 UNITS: Performed by: INTERNAL MEDICINE

## 2022-10-04 PROCEDURE — 25010000002 PIPERACILLIN SOD-TAZOBACTAM PER 1 G: Performed by: INTERNAL MEDICINE

## 2022-10-04 PROCEDURE — 99232 SBSQ HOSP IP/OBS MODERATE 35: CPT | Performed by: HOSPITALIST

## 2022-10-04 PROCEDURE — 85027 COMPLETE CBC AUTOMATED: CPT | Performed by: HOSPITALIST

## 2022-10-04 PROCEDURE — 97162 PT EVAL MOD COMPLEX 30 MIN: CPT

## 2022-10-04 PROCEDURE — 97166 OT EVAL MOD COMPLEX 45 MIN: CPT

## 2022-10-04 PROCEDURE — G0108 DIAB MANAGE TRN  PER INDIV: HCPCS | Performed by: REGISTERED NURSE

## 2022-10-04 PROCEDURE — 82962 GLUCOSE BLOOD TEST: CPT

## 2022-10-04 PROCEDURE — 80053 COMPREHEN METABOLIC PANEL: CPT | Performed by: HOSPITALIST

## 2022-10-04 RX ADMIN — LINEZOLID 600 MG: 600 TABLET, FILM COATED ORAL at 21:50

## 2022-10-04 RX ADMIN — Medication 1 CAPSULE: at 09:11

## 2022-10-04 RX ADMIN — HYDROCODONE BITARTRATE AND ACETAMINOPHEN 1 TABLET: 5; 325 TABLET ORAL at 01:37

## 2022-10-04 RX ADMIN — TAZOBACTAM SODIUM AND PIPERACILLIN SODIUM 3.38 G: 375; 3 INJECTION, SOLUTION INTRAVENOUS at 01:26

## 2022-10-04 RX ADMIN — LINEZOLID 600 MG: 600 TABLET, FILM COATED ORAL at 09:12

## 2022-10-04 RX ADMIN — INSULIN DETEMIR 10 UNITS: 100 INJECTION, SOLUTION SUBCUTANEOUS at 21:55

## 2022-10-04 RX ADMIN — SENNOSIDES AND DOCUSATE SODIUM 2 TABLET: 50; 8.6 TABLET ORAL at 09:11

## 2022-10-04 RX ADMIN — TAZOBACTAM SODIUM AND PIPERACILLIN SODIUM 3.38 G: 375; 3 INJECTION, SOLUTION INTRAVENOUS at 09:25

## 2022-10-04 RX ADMIN — SENNOSIDES AND DOCUSATE SODIUM 2 TABLET: 50; 8.6 TABLET ORAL at 21:50

## 2022-10-04 RX ADMIN — FAMOTIDINE 20 MG: 20 TABLET ORAL at 09:11

## 2022-10-04 RX ADMIN — LEVOTHYROXINE SODIUM 75 MCG: 0.07 TABLET ORAL at 05:21

## 2022-10-04 RX ADMIN — BUMETANIDE 1 MG: 0.25 INJECTION, SOLUTION INTRAMUSCULAR; INTRAVENOUS at 09:11

## 2022-10-04 RX ADMIN — CARVEDILOL 3.12 MG: 3.12 TABLET, FILM COATED ORAL at 09:11

## 2022-10-04 RX ADMIN — APIXABAN 5 MG: 5 TABLET, FILM COATED ORAL at 21:50

## 2022-10-04 RX ADMIN — ATORVASTATIN CALCIUM 20 MG: 20 TABLET, FILM COATED ORAL at 21:50

## 2022-10-04 RX ADMIN — ASPIRIN 81 MG CHEWABLE TABLET 81 MG: 81 TABLET CHEWABLE at 09:11

## 2022-10-04 RX ADMIN — BETHANECHOL CHLORIDE 10 MG: 10 TABLET ORAL at 17:29

## 2022-10-04 RX ADMIN — BETHANECHOL CHLORIDE 10 MG: 10 TABLET ORAL at 21:55

## 2022-10-04 RX ADMIN — TAMSULOSIN HYDROCHLORIDE 0.4 MG: 0.4 CAPSULE ORAL at 09:11

## 2022-10-04 RX ADMIN — CARVEDILOL 3.12 MG: 3.12 TABLET, FILM COATED ORAL at 17:29

## 2022-10-04 RX ADMIN — TAZOBACTAM SODIUM AND PIPERACILLIN SODIUM 3.38 G: 375; 3 INJECTION, SOLUTION INTRAVENOUS at 17:29

## 2022-10-04 RX ADMIN — Medication 5 MG: at 21:50

## 2022-10-04 RX ADMIN — Medication 10 ML: at 09:12

## 2022-10-04 RX ADMIN — APIXABAN 5 MG: 5 TABLET, FILM COATED ORAL at 14:09

## 2022-10-04 RX ADMIN — BETHANECHOL CHLORIDE 10 MG: 10 TABLET ORAL at 09:11

## 2022-10-04 RX ADMIN — ACETAMINOPHEN 650 MG: 325 TABLET ORAL at 21:50

## 2022-10-04 NOTE — PLAN OF CARE
Goal Outcome Evaluation:  Plan of Care Reviewed With: patient        Progress: improving  Outcome Evaluation: OT eval completed, Pt presents with mild dizziness with standing tasks, completed bed mob mod I, SBA STS and no assist with FWW use for weightshifting to complete functional transfers, maxA for offloading shoe, Pt reports having equipment as appropriate, presents at baseline for ADL and functional transfers at this time, recom no further IPOT at this time d/c home with assist

## 2022-10-04 NOTE — THERAPY EVALUATION
Patient Name: Jermaine Singh  : 1945    MRN: [id number]                              Today's Date: 10/4/2022       Admit Date: 10/2/2022    Visit Dx:     ICD-10-CM ICD-9-CM   1. Angina at rest (Formerly [facility])  I20.8 413.9   2. Systolic congestive heart failure, unspecified HF chronicity (Formerly [facility])  I50.20 428.20     428.0   3. 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  Preserved LV function   • Type 2 diabetes mellitus (Formerly [facility])   • Hypertension   • Hyperlipidemia   • Hypothyroidism (acquired)   • Vitamin D deficiency on Rx    • Serum Cr 1.32 on admission.  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 Au   • Dizziness   • Atherosclerosis of native artery of both lower extremities with intermittent claudication (Formerly [facility])   • SSS (sick sinus syndrome) (Formerly [facility])   • Subacute osteomyelitis of left foot (Formerly [facility])   • Lactic acidosis   • Proximal phalanx fracture of the second digit extending into the second metatarsal joint   • [place] (acute kidney injury) (Formerly [facility])   • Acute on chronic HFrEF (heart failure with reduced ejection fraction) (Formerly [facility])   • DVT, bilateral lower limbs (Formerly [facility])   • Elevated troponin   • Cellulitis of right lower extremity     Past Medical History:   Diagnosis Date   • Asthma    • Coronary artery disease    • Diabetes mellitus (Formerly [facility])     started on in[place] 2018; started on po meds in ; checking blood sugars daily    • Disease of thyroid gland     po meds daily for hypothyroidism    • History of fracture as a child     rt leg- severe    • Hyperlipidemia    • Hypertension    • Hypothyroidism    • Peripheral neuropathy    • Peripheral vascular disease (Formerly [facility])     s/p angiogram -needs stent in left leg    • RBBB    • Right knee pain    • Vitamin D deficiency      Past Surgical History:   Procedure Laterality Date   • APPENDECTOMY     • CARDIAC  CATHETERIZATION N/A 2/14/2019    Procedure: Left Heart Cath;  Surgeon: Cooper Apodaca MD;  Location:  [facility];  Service: Cardiology   • CARDIAC ELECTROPHYSIOLOGY PROCEDURE N/A 5/18/2022    Procedure: DEVICE IMPLANT;  Surgeon: Cooper Apodaca MD;  Location:  [facility];  Service: Cardiology;  Laterality: N/A;   • COLONOSCOPY     • CORONARY ARTERY BYPASS GRAFT N/A 3/22/2019    Procedure: MEDIAN STERNOTOMY, CORONARY ARTERY BYPASS GRAFT X3, UTILIZING THE LEFT INTERNAL MAMMARY ARTERY, EVH AND OPEN HARVEST OF THE RIGHT GREATER SAPHENOUS VEIN, EXPLORATION OF THE LEFT LEG;  Surgeon: Ap Au MD;  Location:  [facility];  Service: Cardiothoracic   • EYE SURGERY Bilateral     cataracts    • INTERVENTIONAL RADIOLOGY PROCEDURE N/A 7/29/2021    Procedure: Abdominal Aortagram with Runoff;  Surgeon: Jermaine Hernandez MD;  Location:  [facility];  Service: Cardiovascular;  Laterality: N/A;   • KNEE ARTHROSCOPY      right x 2, left x 1   • LACERATION REPAIR      right leg   • LEG SURGERY      2 for fracture of rt leg    • TONSILLECTOMY      Adnoidectomy   • TRANS METATARSAL AMPUTATION Left 8/2/2022    Procedure: GREAT TOE AMPUTATION LEFT;  Surgeon: Gopal Márquez MD;  Location:  [facility];  Service: Vascular;  Laterality: Left;      General Information     Row Name 10/04/22 1400          Physical Therapy Time and Intention    Document Type evaluation  -MB     Mode of Treatment physical therapy  -MB     Row Name 10/04/22 1400          General Information    Patient Profile Reviewed yes  -MB     Prior Level of Function independent:;bed mobility;transfer;w/c or scooter  PTA pt. was independent w/ bed mobility, transfers, and manual w/c mobility. Pt reports he is able to ambulate independently, but has been NWB LLE until today 10/4 per Dr. Márquez, now WBAT LLE w/ offloading shoe.  -MB     Existing Precautions/Restrictions fall;cardiac;weight bearing  LLE WBAT  with offloading shoe (per Dr. Márquez 10/4 - per patient report), L shoulder pain with h/o dislocation  -MB     Barriers to Rehab medically complex;previous functional deficit  -MB     Row Name 10/04/22 1400          Living Environment    People in Home spouse  -MB     Row Name 10/04/22 1400          Home Main Entrance    Number of Stairs, Main Entrance none  ramp entrance  -MB     Row Name 10/04/22 1400          Stairs Within Home, Primary    Number of Stairs, Within Home, Primary none  -MB     Row Name 10/04/22 1400          Cognition    Orientation Status (Cognition) oriented x 4  -MB     Row Name 10/04/22 1400          Safety Issues, Functional Mobility    Safety Issues Affecting Function (Mobility) awareness of need for assistance;insight into deficits/self-awareness;positioning of assistive device;safety precaution awareness;safety precautions follow-through/compliance  -MB     Impairments Affecting Function (Mobility) endurance/activity tolerance;pain;balance;strength  -MB           User Key  (r) = Recorded By, (t) = Taken By, (c) = Cosigned By    Initials Name Provider Type    Dixie Bautista, PT Physical Therapist               Mobility     Row Name 10/04/22 1547          Bed Mobility    Supine-Sit [place] (Bed Mobility) modified independence  -MB     Assistive Device (Bed Mobility) bed rails;head of bed elevated  -MB     Comment, (Bed Mobility) Pt. able to advance to EOB w/out assist w/ increased time/effort to complete.  -MB     Row Name 10/04/22 1547          Transfers    Comment, (Transfers) Issued and donned offloading shoe LLE. VCs for safe transfer technique.  -MB     Row Name 10/04/22 1547          Sit-Stand Transfer    Sit-Stand [place] (Transfers) contact guard;verbal cues  -MB     Assistive Device (Sit-Stand Transfers) walker, front-wheeled  -MB     Row Name 10/04/22 1547          Gait/Stairs (Locomotion)    [place] Level (Gait) contact guard;verbal cues  -MB     Assistive  Device (Gait) walker, front-wheeled  -MB     Distance in Feet (Gait) 2  -MB     Deviations/Abnormal Patterns (Gait) ashli decreased;gait speed decreased;stride length decreased;weight shifting decreased  -MB     Bilateral Gait Deviations forward flexed posture;heel strike decreased  -MB     Comment, (Gait/Stairs) Pt. ambulated to chair (offloading shoe LLE, WBAT) w/ step to gait pattern, decreased B step length, no LOB. Gait distance limited by fatigue and c/o dizziness.  -MB     Row Name 10/04/22 1547          Mobility    Extremity Weight-bearing Status left lower extremity  -MB     Left Lower Extremity (Weight-bearing Status) weight-bearing as tolerated (WBAT)  Per Dr. Márquez 10/4 (per patient report), with offloading shoe  -MB           User Key  (r) = Recorded By, (t) = Taken By, (c) = Cosigned By    Initials Name Provider Type    Dixie Bautista, PT Physical Therapist               Obj/Interventions     Row Name 10/04/22 1557          Range of Motion Comprehensive    General Range of Motion bilateral lower extremity ROM WFL  -MB     Row Name 10/04/22 1557          Strength Comprehensive (MMT)    General Manual Muscle Testing (MMT) Assessment lower extremity strength deficits identified  -MB     Comment, General Manual Muscle Testing (MMT) Assessment BLEs grossly 4/5  -MB     Row Name 10/04/22 2872          Balance    Balance Assessment standing dynamic balance;standing static balance  -MB     Static Standing Balance supervision  -MB     Dynamic Standing Balance contact guard  -MB     Position/Device Used, Standing Balance supported;walker, rolling  -MB     Balance Interventions standing;sit to stand;occupation based/functional task;weight shifting activity  -MB     Row Name 10/04/22 1550          Sensory Assessment (Somatosensory)    Sensory Assessment (Somatosensory) bilateral LE  -MB     Bilateral LE Sensory Assessment light touch awareness;impaired;light touch localization  -MB           User Key   (r) = Recorded By, (t) = Taken By, (c) = Cosigned By    Initials Name Provider Type    Dixie Bautista, PT Physical Therapist               Goals/Plan     Row Name 10/04/22 1557          Bed Mobility Goal 1 (PT)    Activity/Assistive Device (Bed Mobility Goal 1, PT) sit to supine/supine to sit  bed flat  -MB     [place] Level/Cues Needed (Bed Mobility Goal 1, PT) independent  -MB     Time Frame (Bed Mobility Goal 1, PT) 1 week  -MB     Row Name 10/04/22 1557          Transfer Goal 1 (PT)    Activity/Assistive Device (Transfer Goal 1, PT) sit-to-stand/stand-to-sit;walker, rolling  -MB     [place] Level/Cues Needed (Transfer Goal 1, PT) modified independence  -MB     Time Frame (Transfer Goal 1, PT) 1 week  -MB     Row Name 10/04/22 1557          Gait Training Goal 1 (PT)    Activity/Assistive Device (Gait Training Goal 1, PT) gait (walking locomotion);walker, rolling;decrease fall risk;improve balance and speed;increase endurance/gait distance;maintain weight-bearing status  WBAT LLE with offloading shoe  -MB     [place] Level (Gait Training Goal 1, PT) modified independence  -MB     Distance (Gait Training Goal 1, PT) 50  -MB     Time Frame (Gait Training Goal 1, PT) 10 days  -MB     Row Name 10/04/22 1557          Patient Education Goal (PT)    Activity (Patient Education Goal, PT) HEP  -MB     [place]/Cues/Accuracy (Memory Goal 2, PT) demonstrates adequately  -MB     Time Frame (Patient Education Goal, PT) 1 week  -MB     Row Name 10/04/22 1557          Therapy Assessment/Plan (PT)    Planned Therapy Interventions (PT) balance training;bed mobility training;gait training;home exercise program;patient/family education;strengthening;transfer training  -MB           User Key  (r) = Recorded By, (t) = Taken By, (c) = Cosigned By    Initials Name Provider Type    Dixie Bautista, PT Physical Therapist               Clinical Impression     Row Name 10/04/22 1552          Pain     Additional Documentation Pain Scale: FACES Pre/Post-Treatment (Group)  -MB     Row Name 10/04/22 1552          Pain Scale: FACES Pre/Post-Treatment    Pain: FACES Scale, Pretreatment 2-->hurts little bit  -MB     Posttreatment Pain Rating 4-->hurts little more  -MB     Pre/Posttreatment Pain Comment Pt. c/o pain r/t scrotal edema.  -MB     Row Name 10/04/22 8482          Plan of Care Review    Plan of Care Reviewed With patient  -MB     Progress no change  -MB     Outcome Evaluation PT eval completed. Patient presents w/ generalized weakness, impaired balance, unsteady gait, and decreased functional endurance. Pt. WBAT LLE with offloading shoe per Dr. Márquez (per patient report). Issued new offloading shoe and donned prior to mobility. Pt. completed sit to stand transfers w/ RW and CGA, and ambulated 2ft w/ RW and CGA. Skilled IPPT warranted to improve pt's safety and independence w/ functional mobility. Recommend home w/ assist and HHPT at D/C.  -MB     Row Name 10/04/22 5842          Therapy Assessment/Plan (PT)    Patient/Family Therapy Goals Statement (PT) Return to home and PLOF.  -MB     Rehab Potential (PT) good, to achieve stated therapy goals  -MB     Criteria for Skilled Interventions Met (PT) yes;meets criteria;skilled treatment is necessary  -MB     Therapy Frequency (PT) daily  -MB     Row Name 10/04/22 1772          Vital Signs    Pre Systolic BP Rehab 110  -MB     Pre Treatment Diastolic BP 71  -MB     Post Systolic BP Rehab 117  -MB     Post Treatment Diastolic BP 76  -MB     Pre SpO2 (%) 94  -MB     O2 Delivery Pre Treatment room air  -MB     O2 Delivery Intra Treatment room air  -MB     Post SpO2 (%) 93  -MB     O2 Delivery Post Treatment room air  -MB     Pre Patient Position Supine  -MB     Intra Patient Position Standing  -MB     Post Patient Position Sitting  -MB     Row Name 10/04/22 7647          Positioning and Restraints    Pre-Treatment Position in bed  -MB     Post Treatment Position chair   -MB     In Chair notified nsg;reclined;call light within reach;encouraged to call for assist;exit alarm on;waffle cushion;legs elevated;heels elevated  -MB           User Key  (r) = Recorded By, (t) = Taken By, (c) = Cosigned By    Initials Name Provider Type    Dixie Bautista, PT Physical Therapist               Outcome Measures     Row Name 10/04/22 1558 10/04/22 0859       How much help from another person do you currently need...    Turning from your back to your side while in flat bed without using bedrails? 3  -MB 4  -AM    Moving from lying on back to sitting on the side of a flat bed without bedrails? 3  -MB 4  -AM    Moving to and from a bed to a chair (including a wheelchair)? 3  -MB 3  -AM    Standing up from a chair using your arms (e.g., wheelchair, bedside chair)? 3  -MB 3  -AM    Climbing 3-5 steps with a railing? 2  -MB 2  -AM    To walk in hospital room? 3  -MB 2  -AM    AM-PAC 6 Clicks Score (PT) 17  -MB 18  -AM    Highest level of mobility 5 --> Static standing  -MB 6 --> Walked 10 steps or more  -AM    Row Name 10/04/22 1558 10/04/22 1509       Functional Assessment    Outcome Measure Options AM-PAC 6 Clicks Basic Mobility (PT)  -MB AM-PAC 6 Clicks Daily Activity (OT)  -KF          User Key  (r) = Recorded By, (t) = Taken By, (c) = Cosigned By    Initials Name Provider Type    Dixie Bautista, PT Physical Therapist    Mercedez [place], OT Occupational Therapist    Nikki Sin RN Registered Nurse                             Physical Therapy Education                 Title: PT OT SLP Therapies (In Progress)     Topic: Physical Therapy (Done)     Point: Mobility training (Done)     Learning Progress Summary           Patient Acceptance, E,D, VU,NR by MB at 10/4/2022 1559                   Point: Home exercise program (Done)     Learning Progress Summary           Patient Acceptance, E,D, VU,NR by MB at 10/4/2022 1559                   Point: Body mechanics (Done)      Learning Progress Summary           Patient Acceptance, E,D, VU,NR by MB at 10/4/2022 1559                   Point: Precautions (Done)     Learning Progress Summary           Patient Acceptance, E,D, VU,NR by MB at 10/4/2022 1559                               User Key     Initials Effective Dates Name Provider Type [facility] 06/16/21 -  Dixie Carrizales, PT Physical Therapist PT              PT Recommendation and Plan  Planned Therapy Interventions (PT): balance training, bed mobility training, gait training, home exercise program, patient/family education, strengthening, transfer training  Plan of Care Reviewed With: patient  Progress: no change  Outcome Evaluation: PT eval completed. Patient presents w/ generalized weakness, impaired balance, unsteady gait, and decreased functional endurance. Pt. WBAT LLE with offloading shoe per Dr. Márquez (per patient report). Issued new offloading shoe and donned prior to mobility. Pt. completed sit to stand transfers w/ RW and CGA, and ambulated 2ft w/ RW and CGA. Skilled IPPT warranted to improve pt's safety and independence w/ functional mobility. Recommend home w/ assist and HHPT at D/C.     Time Calculation:    PT Charges     Row Name 10/04/22 1559             Time Calculation    Start Time 1400  -MB      PT Received On 10/04/22  -MB      PT Goal Re-Cert Due Date 10/14/22  -MB              Untimed Charges    PT Eval/Re-eval Minutes 48  -MB              Total Minutes    Untimed Charges Total Minutes 48  -MB       Total Minutes 48  -MB            User Key  (r) = Recorded By, (t) = Taken By, (c) = Cosigned By    Initials Name Provider Type    Dixie Bautista, PT Physical Therapist              Therapy Charges for Today     Code Description Service Date Service Provider Modifiers Qty    [id number]  PT EVAL MOD COMPLEXITY 4 10/4/2022 Dixie Carrizales, PT GP 1          PT G-Codes  Outcome Measure Options: AM-PAC 6 Clicks Basic Mobility (PT)  AM-PAC 6 Clicks  Score (PT): 17  AM-PAC 6 Clicks Score (OT): 17    Dixie Carrizales, PT  10/4/2022

## 2022-10-04 NOTE — PLAN OF CARE
Goal Outcome Evaluation:           Progress: improving  Outcome Evaluation: VSS. Now on room air. Pt educated that he doesnt need to wear oxygen if its not clinically indicated. Pt doesn't wear oxygen at home. Pt worked with therapy. Refused catheter. MD aware. Pt states he is voiding better. Pt .

## 2022-10-04 NOTE — CONSULTS
Mr. Singh gave consent for diabetes education, his wife was also in attendance. He reports he has had T2DM for years. His current A1c is 7.2%. He states he uses a Dexcom CGM to monitor his BG and has it in place at this time. He is aware that [facility] policy states our [employer] meter must be utilized to check his BG while hospitalized. His CGM may remain in place unless ordered to be removed per the attending physician. He reports he takes Levemir 8 units at  for glycemic control. He had also been on Lispro insulin, but it was discontinued at his past hospitalization. Blood glucose here has been .  Discussed and taught  about type 2 diabetes self-management, risk factors, and importance of blood glucose control to reduce complications. Target blood glucose readings and A1c goals per ADA were reviewed. Reviewed current A1c and discussed its significance. Signs, symptoms and treatment of hyperglycemia and hypoglycemia were discussed. Lifestyle changes such as physical activity with MD approval and healthy eating were encouraged. Stressed the importance of strict blood sugar control  to promote healing of cellulitis. Thank you for this referral.

## 2022-10-04 NOTE — PLAN OF CARE
Problem: Adult Inpatient Plan of Care  Goal: Absence of Hospital-Acquired Illness or Injury  Intervention: Identify and Manage Fall Risk  Description: Perform standard risk assessment on admission using a validated tool or comprehensive approach appropriate to the patient; reassess fall risk frequently, with change in status or transfer to another level of care.  Communicate fall injury risk to interprofessional healthcare team.  Determine need for increased observation, equipment and environmental modification, such as low bed, signage and supportive, nonskid footwear.  Adjust safety measures to individual developmental age, stage and identified risk factors.  Reinforce the importance of safety and physical activity with patient and family.  Perform regular intentional rounding to assess need for position change, pain assessment and personal needs, including assistance with toileting.  Recent Flowsheet Documentation  Taken 10/4/2022 0000 by Jaison Castillo, RN  Safety Promotion/Fall Prevention:   activity supervised   assistive device/personal items within reach   clutter free environment maintained   elopement precautions   fall prevention program maintained   nonskid shoes/slippers when out of bed   safety round/check completed  Taken 10/3/2022 2200 by Jaison Castillo, RN  Safety Promotion/Fall Prevention:   activity supervised   assistive device/personal items within reach   clutter free environment maintained   elopement precautions   fall prevention program maintained   nonskid shoes/slippers when out of bed   safety round/check completed  Taken 10/3/2022 2000 by Jaison Castillo, RN  Safety Promotion/Fall Prevention:   assistive device/personal items within reach   activity supervised   clutter free environment maintained   elopement precautions   fall prevention program maintained   nonskid shoes/slippers when out of bed   safety round/check completed  Intervention: Prevent Skin Injury  Description: Perform a screening  for skin injury risk, such as pressure or moisture associated skin damage on admission and at regular intervals throughout hospital stay.  Keep all areas of skin (especially folds) clean and dry.  Maintain adequate skin hydration.  Relieve and redistribute pressure and protect bony prominences; implement measures based on patient-specific risk factors.  Match turning and repositioning schedule to clinical condition.  Encourage weight shift frequently; assist with reposition if unable to complete independently.  Float heels off bed; avoid pressure on the Achilles tendon.  Keep skin free from extended contact with medical devices.  Encourage functional activity and mobility, as early as tolerated.  Use aids (e.g., slide boards, mechanical lift) during transfer.  Recent Flowsheet Documentation  Taken 10/4/2022 0000 by Jaison Castillo RN  Body Position: position changed independently  Skin Protection:   adhesive use limited   incontinence pads utilized   protective footwear used   skin sealant/moisture barrier applied   skin-to-skin areas padded   tubing/devices free from skin contact  Taken 10/3/2022 2200 by Jaison Castillo RN  Body Position: position changed independently  Skin Protection:   adhesive use limited   incontinence pads utilized   protective footwear used   skin sealant/moisture barrier applied   skin-to-skin areas padded   tubing/devices free from skin contact  Taken 10/3/2022 2000 by Jaison Castillo RN  Body Position: position changed independently  Skin Protection:   adhesive use limited   incontinence pads utilized   protective footwear used   skin sealant/moisture barrier applied   skin-to-skin areas padded   tubing/devices free from skin contact  Intervention: Prevent and Manage VTE (Venous Thromboembolism) Risk  Description: Assess for VTE (venous thromboembolism) risk.  Encourage and assist with early ambulation.  Initiate and maintain compression or other therapy, as indicated, based on identified risk in  accordance with organizational protocol and provider order.  Encourage both active and passive leg exercises while in bed, if unable to ambulate.  Recent Flowsheet Documentation  Taken 10/4/2022 0000 by Jaison Castillo RN  Activity Management:   activity adjusted per tolerance   dorsiflexion/plantar flexion performed  Range of Motion: active ROM (range of motion) encouraged  Taken 10/3/2022 2200 by Jaison Castillo RN  Activity Management:   activity adjusted per tolerance   dorsiflexion/plantar flexion performed  Taken 10/3/2022 2000 by Jaison Castillo RN  Activity Management:   activity adjusted per tolerance   dorsiflexion/plantar flexion performed  Range of Motion: ROM (range of motion) performed  Intervention: Prevent Infection  Description: Maintain skin and mucous membrane integrity; promote hand, oral and pulmonary hygiene.  Optimize fluid balance, nutrition, sleep and glycemic control to maximize infection resistance.  Identify potential sources of infection early to prevent or mitigate progression of infection (e.g., wound, lines, devices).  Evaluate ongoing need for invasive devices; remove promptly when no longer indicated.  Recent Flowsheet Documentation  Taken 10/3/2022 2000 by Jaison Castillo RN  Infection Prevention:   environmental surveillance performed   hand hygiene promoted   rest/sleep promoted   single patient room provided  Goal: Optimal Comfort and Wellbeing  Intervention: Provide Person-Centered Care  Description: Use a family-focused approach to care.  Develop trust and rapport by proactively providing information, encouraging questions, addressing concerns and offering reassurance.  Acknowledge emotional response to hospitalization.  Recognize and utilize personal coping strategies.  Honor spiritual and cultural preferences.  Recent Flowsheet Documentation  Taken 10/3/2022 2000 by Jaison Castillo RN  Trust Relationship/Rapport:   care explained   choices provided   emotional support provided   empathic  listening provided   questions answered   questions encouraged   reassurance provided   thoughts/feelings acknowledged     Problem: Fall Injury Risk  Goal: Absence of Fall and Fall-Related Injury  Outcome: Ongoing, Progressing  Intervention: Identify and Manage Contributors  Description: Develop a fall prevention plan with the patient and caregiver/family.  Provide reorientation, appropriate sensory stimulation and routines with changes in mental status to decrease risk of fall.  Promote use of personal vision and auditory aids.  Assess assistance level required for safe and effective self-care; provide support as needed, such as toileting, mobilization. For age 65 and older, implement timed toileting with assistance.  Encourage physical activity, such as performance of mobility and self-care at highest level of patient ability, multicomponent exercise program and provision of appropriate assistive devices.  If fall occurs, assess the severity of injury; implement fall injury protocol. Determine the cause and revise fall injury prevention plan.  Regularly review medication contribution to fall risk; adjust medication administration times to minimize risk of falling.  Consider risk related to polypharmacy and age.  Balance adequate pain management with potential for oversedation.  Flowsheets  Taken 10/3/2022 2000  Medication Review/Management:   medications reviewed   high-risk medications identified  Taken 10/3/2022 0052  Self-Care Promotion:   independence encouraged   BADL personal objects within reach  Intervention: Promote Injury-Free Environment  Description: Provide a safe, barrier-free environment that encourages independent activity.  Keep care area uncluttered and well-lighted.  Determine need for increased observation or monitoring.  Avoid use of devices that minimize mobility, such as restraints or indwelling urinary catheter.  Flowsheets  Taken 10/4/2022 0000  Safety Promotion/Fall Prevention:   activity  supervised   assistive device/personal items within reach   clutter free environment maintained   elopement precautions   fall prevention program maintained   nonskid shoes/slippers when out of bed   safety round/check completed  Taken 10/3/2022 2200  Safety Promotion/Fall Prevention:   activity supervised   assistive device/personal items within reach   clutter free environment maintained   elopement precautions   fall prevention program maintained   nonskid shoes/slippers when out of bed   safety round/check completed  Taken 10/3/2022 2000  Safety Promotion/Fall Prevention:   assistive device/personal items within reach   activity supervised   clutter free environment maintained   elopement precautions   fall prevention program maintained   nonskid shoes/slippers when out of bed   safety round/check completed     Problem: Asthma Comorbidity  Goal: Maintenance of Asthma Control  Intervention: Maintain Asthma Symptom Control  Description: Evaluate adherence to self-management (asthma action plan), such as medication, symptom-control, trigger-avoidance and self-monitoring.  Advocate for continuation of home regimen, including medication, method of delivery, schedule and symptom monitoring; acknowledge preferred modality and routine.  Minimize exposure to potential triggers, such as perfume, cleaning chemicals and all types of smoke.  Assess for proper use of inhaled medication and delivery technique; assist or reinstruct if needed.  Evaluate effectiveness of coping skills; encourage expression of feelings, expectations and concerns related to disease management and quality of life; reinforce education to enhance management plan and wellbeing.  Recent Flowsheet Documentation  Taken 10/3/2022 2000 by Jaison Castillo, RN  Medication Review/Management:   medications reviewed   high-risk medications identified     Problem: Behavioral Health Comorbidity  Goal: Maintenance of Behavioral Health Symptom Control  Intervention:  Maintain Behavioral Health Symptom Control  Description: Confirm mental health diagnosis and current treatment.  Evaluate adherence to previously identified self-management plan.  Advocate continuation of home strategies, including medication.  Evaluate effectiveness of self-management strategies and coping skills.  Communicate with providers to ensure continuity and follow-up at transition.  Recent Flowsheet Documentation  Taken 10/3/2022 2000 by Jaison Castillo RN  Medication Review/Management:   medications reviewed   high-risk medications identified     Problem: COPD (Chronic Obstructive Pulmonary Disease) Comorbidity  Goal: Maintenance of COPD Symptom Control  Intervention: Maintain COPD-Symptom Control  Description: Evaluate adherence to management plan (e.g., medication, trigger avoidance, infection prevention, self-monitoring).  Advocate for continuation of home regimen, including medication, method of delivery, schedule and symptom monitoring.  Anticipate the need for breathing techniques and activity pacing to minimize fatigue and breathlessness.  Assess for proper use of inhaled medication and delivery technique; assist or reinstruct if needed.  Evaluate effectiveness of coping skills; encourage expression of feelings, expectations and concerns related to disease management and quality of life; reinforce education to enhance management plan and wellbeing.  Recent Flowsheet Documentation  Taken 10/3/2022 2000 by Jaison Castillo RN  Supportive Measures:   active listening utilized   positive reinforcement provided   problem-solving facilitated   relaxation techniques promoted   self-care encouraged   self-reflection promoted   self-responsibility promoted   verbalization of feelings encouraged  Medication Review/Management:   medications reviewed   high-risk medications identified     Problem: Heart Failure Comorbidity  Goal: Maintenance of Heart Failure Symptom Control  Intervention: Maintain Heart  Failure-Management  Description: Evaluate adherence to home heart failure self-care regimen (e.g., medication, fluid balance, sodium intake, daily weight, physical activity, telemonitoring, support).  Advocate continuation of home medication and schedule.  Consider pharmacologic therapy administration time and effects (e.g., avoid giving diuretic prior to bedtime or nitrates on empty stomach).  Monitor response to pharmacologic therapy, including weight fluctuations, blood pressure and electrolyte levels.  Monitor for signs and symptoms of anxiety and depression, including severity and duration; if present, provide psychosocial support.  Consider need for heart failure clinic or palliative care consult.  Recent Flowsheet Documentation  Taken 10/3/2022 2000 by Jaison Castillo RN  Medication Review/Management:   medications reviewed   high-risk medications identified     Problem: Hypertension Comorbidity  Goal: Blood Pressure in Desired Range  Intervention: Maintain Blood Pressure Management  Description: Evaluate adherence to home antihypertensive regimen (e.g., exercise and activity, diet modification, medication).  Provide scheduled antihypertensive medication; consider administration time and effects (e.g., avoid giving diuretic prior to bedtime).  Monitor response to antihypertensive medication therapy (e.g., blood pressure, electrolyte levels, medication effects).  Minimize risk of orthostatic hypotension; encourage caution with position changes, particularly if elderly.  Recent Flowsheet Documentation  Taken 10/3/2022 2000 by Jaison Castillo RN  Medication Review/Management:   medications reviewed   high-risk medications identified     Problem: Osteoarthritis Comorbidity  Goal: Maintenance of Osteoarthritis Symptom Control  Intervention: Maintain Osteoarthritis Symptom Control  Description: Evaluate adherence to self-management plan, such as medication, exercise and weight management.  Advocate for continuation of  home regimen, such as medication, physical activity and thermal agents; monitor response.  Encourage participation in functional activities, such as mobility and ADLs (activities of daily living) to minimize decline associated with inactivity.  Facilitate use of patient-specific assistive devices, equipment or orthoses.  Evaluate effectiveness of coping skills; encourage expression of feelings, expectations and concerns related to disease management and quality of life; reinforce education to enhance management plan and wellbeing.  Recent Flowsheet Documentation  Taken 10/4/2022 0000 by Jaison Castillo RN  Activity Management:   activity adjusted per tolerance   dorsiflexion/plantar flexion performed  Taken 10/3/2022 2200 by Jaisno Castillo, RN  Activity Management:   activity adjusted per tolerance   dorsiflexion/plantar flexion performed  Taken 10/3/2022 2000 by Jaison Castillo RN  Activity Management:   activity adjusted per tolerance   dorsiflexion/plantar flexion performed  Medication Review/Management:   medications reviewed   high-risk medications identified     Problem: Pain Chronic (Persistent) (Comorbidity Management)  Goal: Acceptable Pain Control and Functional Ability  Intervention: Manage Persistent Pain  Description: Evaluate pain level, effect of treatment and patient response at regular intervals.  Minimize pain stimuli; coordinate care and adjust environment (e.g., light, noise, unnecessary movement); promote sleep/rest.  Match pharmacologic analgesia to severity and type of pain mechanism (e.g., neuropathic, muscle, inflammatory); consider multimodal approach (e.g., nonopioid, opioid, adjuvant).  Provide medication at regular intervals; titrate to patient response.  Manage breakthrough pain with additional doses; consider rotation or switching medication.  Monitor for signs of substance tolerance (increased dose to reach desired effect, decreased effect with same dose).  Avoid abrupt withdrawal of  medication, especially agents capable of causing physical dependence.  Manage medication-induced effects, such as constipation, nausea, pruritus, urinary retention, somnolence and dizziness.  Provide multimodal treatment interventions, such as physical activity, therapeutic exercise, yoga, TENS (transcutaneous electrical nerve stimulation) and manual therapy.  Train in functional activity modifications, such as body mechanics, posture, ergonomics, energy conservation and activity pacing.  Consider addition of complementary or alternative therapy, such as acupuncture, hypnosis or therapeutic touch.  Recent Flowsheet Documentation  Taken 10/3/2022 2000 by Jaison Castillo RN  Medication Review/Management:   medications reviewed   high-risk medications identified  Intervention: Optimize Psychosocial Wellbeing  Description: Facilitate patient’s self-control over pain by providing pain information and allowing choices in treatment.  Consider and address emotional response to pain.  Explore and promote use of coping strategies; address barriers to successful coping.  Evaluate and assist with psychosocial, cultural and spiritual factors impacting pain.  Modify pain perception by using techniques, such as distraction, mindfulness, guided imagery, meditation or music.  Assess and monitor for signs and symptoms of behavioral health concerns, such as unhealthy substance use, depression and suicidal ideation.  Consider referral for ongoing coping support, such as cognitive behavioral therapy and mindfulness-based stress reduction.  Recent Flowsheet Documentation  Taken 10/3/2022 2000 by Jaison Castillo RN  Supportive Measures:   active listening utilized   positive reinforcement provided   problem-solving facilitated   relaxation techniques promoted   self-care encouraged   self-reflection promoted   self-responsibility promoted   verbalization of feelings encouraged  Diversional Activities:   television   smartphone     Problem: Skin  Injury Risk Increased  Goal: Skin Health and Integrity  Intervention: Optimize Skin Protection  Description: Perform a full pressure injury risk assessment, as indicated by screening, upon admission to care unit.  Reassess skin (injury risk, full inspection) frequently (e.g., scheduled interval, with change in condition) to provide optimal early detection and prevention.  Maintain adequate tissue perfusion (e.g., encourage fluid balance; avoid crossing legs, constrictive clothing or devices) to promote tissue oxygenation.  Maintain head of bed at lowest degree of elevation tolerated, considering medical condition and other restrictions.  Avoid positioning onto an area that remains reddened.  Minimize incontinence and moisture (e.g., toileting schedule; moisture-wicking pad, diaper or incontinence collection device; skin moisture barrier).  Cleanse skin promptly and gently when soiled utilizing a pH-balanced cleanser.  Relieve and redistribute pressure (e.g., scheduled position changes, weight shifts, use of support surface, medical device repositioning, protective dressing application, use of positioning device, microclimate control, use of pressure-injury-monitor  Encourage increased activity, such as sitting in a chair at the bedside or early mobilization, when able to tolerate.  Recent Flowsheet Documentation  Taken 10/4/2022 0000 by Jaison Castillo, RN  Pressure Reduction Techniques:   frequent weight shift encouraged   heels elevated off bed   positioned off wounds   pressure points protected   weight shift assistance provided  Head of Bed (HOB) Positioning: HOB elevated  Pressure Reduction Devices:   pressure-redistributing mattress utilized   positioning supports utilized   heel offloading device utilized  Skin Protection:   adhesive use limited   incontinence pads utilized   protective footwear used   skin sealant/moisture barrier applied   skin-to-skin areas padded   tubing/devices free from skin contact  Taken  10/3/2022 2200 by Jaison Castillo RN  Pressure Reduction Techniques:   frequent weight shift encouraged   heels elevated off bed   positioned off wounds   pressure points protected   weight shift assistance provided  Head of Bed (HOB) Positioning: [facility] elevated  Pressure Reduction Devices:   pressure-redistributing mattress utilized   positioning supports utilized   heel offloading device utilized  Skin Protection:   adhesive use limited   incontinence pads utilized   protective footwear used   skin sealant/moisture barrier applied   skin-to-skin areas padded   tubing/devices free from skin contact  Taken 10/3/2022 2000 by Jaison Castillo RN  Pressure Reduction Techniques:   frequent weight shift encouraged   heels elevated off bed   positioned off wounds   pressure points protected   weight shift assistance provided  Head of Bed (HOB) Positioning: [facility] elevated  Pressure Reduction Devices:   pressure-redistributing mattress utilized   positioning supports utilized   heel offloading device utilized  Skin Protection:   adhesive use limited   incontinence pads utilized   protective footwear used   skin sealant/moisture barrier applied   skin-to-skin areas padded   tubing/devices free from skin contact   Goal Outcome Evaluation:

## 2022-10-04 NOTE — THERAPY DISCHARGE NOTE
Acute Care - Occupational Therapy Discharge  [facility]    Patient Name: Jermaine Singh  : 1945    MRN: [id number]                              Today's Date: 10/4/2022       Admit Date: 10/2/2022    Visit Dx:     ICD-10-CM ICD-9-CM   1. Angina at rest ([facility])  I20.8 413.9   2. Systolic congestive heart failure, unspecified HF chronicity ([facility])  I50.20 428.20     428.0   3. 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  Preserved LV function   • Type 2 diabetes mellitus ([facility])   • Hypertension   • Hyperlipidemia   • Hypothyroidism (acquired)   • Vitamin D deficiency on Rx    • Serum Cr 1.32 on admission.  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 Au   • Dizziness   • Atherosclerosis of native artery of both lower extremities with intermittent claudication ([facility])   • SSS (sick sinus syndrome) ([facility])   • Subacute osteomyelitis of left foot ([facility])   • Lactic acidosis   • Proximal phalanx fracture of the second digit extending into the second metatarsal joint   • A[place] (acute kidney injury) ([facility])   • Acute on chronic HFrEF (heart failure with reduced ejection fraction) ([facility])   • DVT, bilateral lower limbs ([facility])   • Elevated troponin   • Cellulitis of right lower extremity     Past Medical History:   Diagnosis Date   • Asthma    • Coronary artery disease    • Diabetes mellitus ([facility]) 2000    started on in[place] 2018; started on po meds in ; checking blood sugars daily    • Disease of thyroid gland     po meds daily for hypothyroidism    • History of fracture as a child     rt leg- severe    • Hyperlipidemia    • Hypertension    • Hypothyroidism    • Peripheral neuropathy    • Peripheral vascular disease ([facility])     s/p angiogram -needs stent in left leg    • RBBB    • Right knee pain    • Vitamin D deficiency      Past Surgical History:   Procedure  Laterality Date   • APPENDECTOMY     • CARDIAC CATHETERIZATION N/A 2/14/2019    Procedure: Left Heart Cath;  Surgeon: Cooper Apodaca MD;  Location:  [facility];  Service: Cardiology   • CARDIAC ELECTROPHYSIOLOGY PROCEDURE N/A 5/18/2022    Procedure: DEVICE IMPLANT;  Surgeon: Cooper Apodaca MD;  Location:  [facility];  Service: Cardiology;  Laterality: N/A;   • COLONOSCOPY     • CORONARY ARTERY BYPASS GRAFT N/A 3/22/2019    Procedure: MEDIAN STERNOTOMY, CORONARY ARTERY BYPASS GRAFT X3, UTILIZING THE LEFT INTERNAL MAMMARY ARTERY, EVH AND OPEN HARVEST OF THE RIGHT GREATER SAPHENOUS VEIN, EXPLORATION OF THE LEFT LEG;  Surgeon: Ap Au MD;  Location:  [facility];  Service: Cardiothoracic   • EYE SURGERY Bilateral     cataracts    • INTERVENTIONAL RADIOLOGY PROCEDURE N/A 7/29/2021    Procedure: Abdominal Aortagram with Runoff;  Surgeon: Jermaine Hernandez MD;  Location:  [facility];  Service: Cardiovascular;  Laterality: N/A;   • KNEE ARTHROSCOPY      right x 2, left x 1   • LACERATION REPAIR      right leg   • LEG SURGERY      2 for fracture of rt leg    • TONSILLECTOMY      Adnoidectomy   • TRANS METATARSAL AMPUTATION Left 8/2/2022    Procedure: GREAT TOE AMPUTATION LEFT;  Surgeon: Gopal Márquez MD;  Location:  [facility];  Service: Vascular;  Laterality: Left;      General Information     Row Name 10/04/22 1452          OT Time and Intention    Document Type discharge evaluation/summary  -KF     Mode of Treatment occupational therapy  -KF     Row Name 10/04/22 1452          General Information    Patient Profile Reviewed yes  -KF     Prior Level of Function transfer;bed mobility;min assist:;mod assist:;ADL's;independent:;w/c or scooter  wc for functional mob; sponge bathing  -KF     Existing Precautions/Restrictions fall;cardiac;weight bearing;left  LLE WBAT per Pt report with offloading shoe per MD Márquez  -KF     Barriers to Rehab medically  complex;ineffective coping  -     Row Name 10/04/22 1452          Living Environment    People in Home spouse  -     Row Name 10/04/22 1452          Home Main Entrance    Number of Stairs, Main Entrance none  -KF     Row Name 10/04/22 1452          Stairs Within Home, Primary    Number of Stairs, Within Home, Primary none  -KF     Row Name 10/04/22 1452          Cognition    Orientation Status (Cognition) oriented x 4  -     Row Name 10/04/22 1452          Safety Issues, Functional Mobility    Safety Issues Affecting Function (Mobility) insight into deficits/self-awareness;awareness of need for assistance;safety precaution awareness  -KF     Impairments Affecting Function (Mobility) endurance/activity tolerance;pain  -KF     Comment, Safety Issues/Impairments (Mobility) scrotal edema  -KF           User Key  (r) = Recorded By, (t) = Taken By, (c) = Cosigned By    Initials Name Provider Type    KF Mercedez Navas, OT Occupational Therapist               Mobility/ADL's     Row Name 10/04/22 1455          Bed Mobility    Bed Mobility scooting/bridging;supine-sit  -KF     Scooting/Bridging [place] (Bed Mobility) modified independence  -KF     Supine-Sit [place] (Bed Mobility) modified independence  -KF     Bed Mobility, Safety Issues decreased use of legs for bridging/pushing  -     Comment, (Bed Mobility) able to complete all bed mob without assist, able to push with BUEs well to lift and reposition off bed to compensate for scrotal edema  -     Row Name 10/04/22 1455          Transfers    Transfers sit-stand transfer;stand-sit transfer  -KF     Comment, (Transfers) impulsively stood from EOB, no physical assist to stand mild dizziness throughout in standing  -KF     Sit-Stand [place] (Transfers) standby assist  -KF     Stand-Sit [place] (Transfers) standby assist  -     Row Name 10/04/22 1455          Sit-Stand Transfer    Assistive Device (Sit-Stand Transfers) walker, front-wheeled   -     Row Name 10/04/22 1455          Stand-Sit Transfer    Assistive Device (Stand-Sit Transfers) walker, front-wheeled  -     Row Name 10/04/22 1455          Functional Mobility    Functional Mobility- Safety Issues weight-shifting ability decreased;step length decreased  -     Functional Mobility- Comment deferred to PT; recently using wc for mobility  -     Row Name 10/04/22 1455          Activities of Daily Living    BADL Assessment/Intervention upper body dressing;lower body dressing  -     Row Name 10/04/22 1455          Upper Body Dressing Assessment/Training    [place] Level (Upper Body Dressing) don;doff;front opening garment;set up  -     Position (Upper Body Dressing) edge of bed sitting  -     Row Name 10/04/22 1455          Lower Body Dressing Assessment/Training    [place] Level (Lower Body Dressing) don;shoes/slippers;dependent (less than 25% patient effort)  -     Position (Lower Body Dressing) sitting up in bed  -           User Key  (r) = Recorded By, (t) = Taken By, (c) = Cosigned By    Initials Name Provider Type    KF Mercedez Navas, OT Occupational Therapist               Obj/Interventions     Row Name 10/04/22 1458          Sensory Assessment (Somatosensory)    Sensory Assessment (Somatosensory) UE sensation intact  -     Row Name 10/04/22 1458          Vision Assessment/Intervention    Visual Impairment/Limitations WFL  -     Row Name 10/04/22 1458          Range of Motion Comprehensive    General Range of Motion bilateral upper extremity ROM WFL  -     Comment, General Range of Motion Pt reports history of subluxation LUE with sensitivity to touch  -     Row Name 10/04/22 1458          Strength Comprehensive (MMT)    General Manual Muscle Testing (MMT) Assessment upper extremity strength deficits identified  -     Comment, General Manual Muscle Testing (MMT) Assessment demonstrates 5/5 BUE strength WFL for ADL completion  -     Row Name 10/04/22  1458          Balance    Balance Assessment sitting static balance;sitting dynamic balance;standing static balance;standing dynamic balance  -KF     Static Sitting Balance independent  -KF     Dynamic Sitting Balance independent  -KF     Position, Sitting Balance unsupported;sitting edge of bed  -KF     Static Standing Balance independent  -KF     Dynamic Standing Balance standby assist  -KF     Position/Device Used, Standing Balance supported;walker, front-wheeled  -KF     Balance Interventions sit to stand;standing;weight shifting activity  -KF     Comment, Balance overall no assist for balance SBA throughout to weightshift at FWW, presents at baseline for functional transfers  -KF           User Key  (r) = Recorded By, (t) = Taken By, (c) = Cosigned By    Initials Name Provider Type    KF Mercedez Navas OT Occupational Therapist               Goals/Plan    No documentation.                Clinical Impression     Row Name 10/04/22 1502          Pain Assessment    Additional Documentation Pain Scale: FACES Pre/Post-Treatment (Group)  -KF     Row Name 10/04/22 1502          Pain Scale: FACES Pre/Post-Treatment    Pain: FACES Scale, Pretreatment 4-->hurts little more  -KF     Posttreatment Pain Rating 4-->hurts little more  -KF     Pain Location - Side/Orientation Right  -KF     Pain Location lower  -KF     Pain Location - extremity  -KF     Pre/Posttreatment Pain Comment and L shoulder; Pt tolerated  -KF     Row Name 10/04/22 1502          Plan of Care Review    Plan of Care Reviewed With patient  -KF     Progress improving  -KF     Outcome Evaluation OT eval completed, Pt presents with mild dizziness with standing tasks, completed bed mob mod I, SBA STS and no assist with FWW use for weightshifting to complete functional transfers, maxA for offloading shoe, Pt reports having equipment as appropriate, presents at baseline for ADL and functional transfers at this time, recom no further IPOT at this time d/c home  with assist  -KF     Row Name 10/04/22 1502          Therapy Assessment/Plan (OT)    Rehab Potential (OT) --  -KF     Criteria for Skilled Therapeutic Interventions Met (OT) no;no problems identified which require skilled intervention  -KF     Therapy Frequency (OT) evaluation only  -KF     Row Name 10/04/22 1502          Therapy Plan Review/Discharge Plan (OT)    Equipment Needs Upon Discharge (OT) --  none anticipated  -KF     Anticipated Discharge Disposition (OT) home with 24/7 care  -KF     Row Name 10/04/22 1502          Vital Signs    Pre Systolic BP Rehab 110  mild dizziness however no sig change in BP  -KF     Pre Treatment Diastolic BP 71  -KF     Post Systolic BP Rehab 117  -KF     Pre SpO2 (%) 93  -KF     O2 Delivery Pre Treatment room air  -KF     Post SpO2 (%) 93  -KF     O2 Delivery Post Treatment room air  -KF     Pre Patient Position Supine  -KF     Intra Patient Position Standing  -KF     Post Patient Position Sitting  -KF     Rest Breaks  1  -KF     Row Name 10/04/22 1502          Positioning and Restraints    Pre-Treatment Position in bed  -KF     Post Treatment Position chair  -KF     In Chair notified nsg;reclined;sitting;call light within reach;encouraged to call for assist;waffle cushion;with PT;legs elevated;heels elevated  -KF           User Key  (r) = Recorded By, (t) = Taken By, (c) = Cosigned By    Initials Name Provider Type    KF Mercedez Navas, OT Occupational Therapist               Outcome Measures     Row Name 10/04/22 1505          How much help from another is currently needed...    Putting on and taking off regular lower body clothing? 2  -KF     Bathing (including washing, rinsing, and drying) 2  -KF     Toileting (which includes using toilet bed pan or urinal) 2  -KF     Putting on and taking off regular upper body clothing 3  -KF     Taking care of personal grooming (such as brushing teeth) 4  -KF     Eating meals 4  -KF     AM-PAC 6 Clicks Score (OT) 17  -KF     Row Name  10/04/22 0859          How much help from another person do you currently need...    Turning from your back to your side while in flat bed without using bedrails? 4  -AM     Moving from lying on back to sitting on the side of a flat bed without bedrails? 4  -AM     Moving to and from a bed to a chair (including a wheelchair)? 3  -AM     Standing up from a chair using your arms (e.g., wheelchair, bedside chair)? 3  -AM     Climbing 3-5 steps with a railing? 2  -AM     To walk in hospital room? 2  -AM     AM-PAC 6 Clicks Score (PT) 18  -AM     Highest level of mobility 6 --> Walked 10 steps or more  -AM     Row Name 10/04/22 1509          Functional Assessment    Outcome Measure Options AM-PAC 6 Clicks Daily Activity (OT)  -           User Key  (r) = Recorded By, (t) = Taken By, (c) = Cosigned By    Initials Name Provider Type    KF Mercedez Navas OT Occupational Therapist    Nikki Sin RN Registered Nurse              Occupational Therapy Education                 Title: PT OT SLP Therapies (In Progress)     Topic: Occupational Therapy (In Progress)     Point: ADL training (Done)     Description:   Instruct learner(s) on proper safety adaptation and remediation techniques during self care or transfers.   Instruct in proper use of assistive devices.              Learning Progress Summary           Patient Acceptance, E,TB,D, VU,DU by KF at 10/4/2022 1512                   Point: Home exercise program (Not Started)     Description:   Instruct learner(s) on appropriate technique for monitoring, assisting and/or progressing therapeutic exercises/activities.              Learner Progress:  Not documented in this visit.          Point: Precautions (Done)     Description:   Instruct learner(s) on prescribed precautions during self-care and functional transfers.              Learning Progress Summary           Patient Acceptance, E,TB,D, VU,DU by KF at 10/4/2022 1512                   Point: Body mechanics  (Done)     Description:   Instruct learner(s) on proper positioning and spine alignment during self-care, functional mobility activities and/or exercises.              Learning Progress Summary           Patient Acceptance, E,TB,D, VU,DU by KF at 10/4/2022 1512                               User Key     Initials Effective Dates Name Provider Type [facility] 06/16/21 -  Mercedez Navas OT Occupational Therapist OT              OT Recommendation and Plan  Therapy Frequency (OT): evaluation only  Plan of Care Review  Plan of Care Reviewed With: patient  Progress: improving  Outcome Evaluation: OT eval completed, Pt presents with mild dizziness with standing tasks, completed bed mob mod I, SBA STS and no assist with FWW use for weightshifting to complete functional transfers, maxA for offloading shoe, Pt reports having equipment as appropriate, presents at baseline for ADL and functional transfers at this time, recom no further IPOT at this time d/c home with assist  Plan of Care Reviewed With: patient  Outcome Evaluation: OT eval completed, Pt presents with mild dizziness with standing tasks, completed bed mob mod I, SBA STS and no assist with FWW use for weightshifting to complete functional transfers, maxA for offloading shoe, Pt reports having equipment as appropriate, presents at baseline for ADL and functional transfers at this time, recom no further IPOT at this time d/c home with assist     Time Calculation:    Time Calculation- OT     Row Name 10/04/22 1410             Time Calculation- OT    OT Start Time 1410  -KF      OT Received On 10/04/22  -KF              Untimed Charges    OT Eval/Re-eval Minutes 46  -KF              Total Minutes    Untimed Charges Total Minutes 46  -KF       Total Minutes 46  -KF            User Key  (r) = Recorded By, (t) = Taken By, (c) = Cosigned By    Initials Name Provider Type     Mercedez Navas OT Occupational Therapist              Therapy Charges for Today      Code Description Service Date Service Provider Modifiers Qty    [id number] HC OT EVAL MOD COMPLEXITY 4 10/4/2022 Mercedez Navas, OT GO 1             OT Discharge Summary  Anticipated Discharge Disposition (OT): home with 24/7 care  Reason for Discharge: Maximum functional potential achieved  Outcomes Achieved: Refer to plan of care for updates on goals achieved  Discharge Destination: Home with assist    Mercedez Navas OT  10/4/2022

## 2022-10-04 NOTE — PROGRESS NOTES
"                                 [facility] Progress Note      LOS: 2 days   Patient Care Team:  Farooq Painter MD as PCP - General (Family Medicine)  Jermaine Hernandez MD as Consulting Physician (Cardiology)    Chief Complaint:    Chief Complaint   Patient presents with   • Shortness of Breath       Subjective     Interval History: Breathing improved    Patient Complaints: Complains of anxiousness.  No chest pain.  Somewhat dyspneic with exertion      Review of Systems:   A 14 point review of systems was negative except as was stated in the HPI      Objective     Vital Sign Min/Max for last 24 hours  Temp  Min: 97.6 °F (36.4 °C)  Max: 98.6 °F (37 °C)   BP  Min: 100/66  Max: 117/71   Pulse  Min: 63  Max: 83   Resp  Min: 16  Max: 18   SpO2  Min: 90 %  Max: 99 %   Flow (L/min)  Min: 4  Max: 4   No data recorded     Flowsheet Rows    Flowsheet Row First Filed Value   Admission Height 190.5 cm (75\") Documented at 10/02/2022 0306   Admission Weight 103 kg (227 lb) Documented at 10/02/2022 0306          Physical Exam:  General Appearance: Alert, appears stated age and cooperative  Lungs: Clear to auscultation  Heart:: RRR   No Murmurs, Rubs or Gallops  Abdomen: Soft and nontender with adequate bowel sounds.  No organomegaly  Extremities: Left foot bandaged.  Erythematous excoriations right lower leg  Pulses: Pulses palpable and equal bilaterally  Skin: Warm and dry with no rash  Psych: Normal     Results Review:     I reviewed the patient's new clinical results.  Results from last 7 days   Lab Units 10/03/22  1044 10/02/22  0332   SODIUM mmol/L 141 140   POTASSIUM mmol/L 5.1 4.8   CHLORIDE mmol/L 105 103   CO2 mmol/L 26.0 24.0   BUN mg/dL 33* 33*   CREATININE mg/dL 2.15* 2.18*   GLUCOSE mg/dL 110* 175*   CALCIUM mg/dL 8.8 9.2     Results from last 7 days   Lab Units 10/04/22  0925 10/03/22  0038 10/02/22  0332   WBC 10*3/mm3 5.65 5.13 5.50   HEMOGLOBIN g/dL 10.4* 9.7* 10.2*   HEMATOCRIT % 32.9* 30.8* " 32.4*   PLATELETS 10*3/mm3 153 131* 142     Lab Results   Lab Value Date/Time    TROPONINT 0.132 (C) 10/03/2022 1044    TROPONINT 0.151 (C) 10/02/2022 2205    TROPONINT 0.129 (C) 10/02/2022 2020    TROPONINT 0.107 (C) 10/02/2022 1435    TROPONINT 0.129 (C) 10/02/2022 0839    TROPONINT 0.139 (C) 10/02/2022 0332    TROPONINT 0.230 (C) 08/13/2022 2310    TROPONINT 0.229 (C) 08/13/2022 1224    TROPONINT 0.230 (C) 08/13/2022 0730    TROPONINT 0.206 (C) 08/12/2022 1653    TROPONINT 0.157 (C) 08/12/2022 1025    TROPONINT 0.146 (C) 08/11/2022 0636    TROPONINT 0.129 (C) 08/10/2022 2239    TROPONINT 0.108 (C) 08/10/2022 1638    TROPONINT 0.013 10/20/2021 1705    TROPONINT <0.010 10/20/2021 1508         Results from last 7 days   Lab Units 10/03/22  0038   INR  1.48*               Medication Review: yes  Current Facility-Administered Medications   Medication Dose Route Frequency Provider Last Rate Last Admin   • acetaminophen (TYLENOL) tablet 650 mg  650 mg Oral Q4H PRN Renetta Gatica II, DO        Or   • acetaminophen (TYLENOL) 160 MG/5ML solution 650 mg  650 mg Oral Q4H PRN Renetta Gatica II, DO        Or   • acetaminophen (TYLENOL) suppository 650 mg  650 mg Rectal Q4H PRN Renetta Gatica II, DO       • aspirin chewable tablet 81 mg  81 mg Oral Daily Renetta Gatica II, DO   81 mg at 10/04/22 0911   • atorvastatin (LIPITOR) tablet 20 mg  20 mg Oral Nightly Sarah Pineda APRN   20 mg at 10/03/22 2031   • bethanechol (URECHOLINE) tablet 10 mg  10 mg Oral TID Renetta Gatica II, DO   10 mg at 10/04/22 0911   • sennosides-docusate (PERICOLACE) 8.6-50 MG per tablet 2 tablet  2 tablet Oral BID Renetta Gatica II, DO   2 tablet at 10/04/22 0911    And   • polyethylene glycol (MIRALAX) packet 17 g  17 g Oral Daily PRN Renetta Gatica II, DO        And   • bisacodyl (DULCOLAX) EC tablet 5 mg  5 mg Oral Daily PRN Renetta Gatica II, DO        And   • bisacodyl (DULCOLAX) suppository 10 mg  10 mg Rectal Daily PRN En,  Renetta M II, DO       • bumetanide (BUMEX) injection 1 mg  1 mg Intravenous Daily Fredy Jacinta, DO   1 mg at 10/04/22 0911   • carvedilol (COREG) tablet 3.125 mg  3.125 mg Oral BID With Meals Renetta Gatica II, DO   3.125 mg at 10/04/22 0911   • dextrose (D50W) (25 g/50 mL) IV injection 25 g  25 g Intravenous Q15 Min PRN Renetta Gatica II, DO       • dextrose (GLUTOSE) oral gel 15 g  15 g Oral Q15 Min PRN EnRenetta II, DO       • Diclofenac Sodium (VOLTAREN) 1 % gel 2 g  2 g Topical 4x Daily PRN EnRenetta II, DO       • famotidine (PEPCID) tablet 20 mg  20 mg Oral Daily EnRenetta II, DO   20 mg at 10/04/22 0911   • glucagon (human recombinant) (GLUCAGEN DIAGNOSTIC) injection 1 mg  1 mg Intramuscular Q15 Min PRN Renetta Gatica II, DO       • HYDROcodone-acetaminophen (NORCO) 5-325 MG per tablet 1 tablet  1 tablet Oral Q4H PRN Renetta Gatica II, DO   1 tablet at 10/04/22 0137   • Influenza Vac High-Dose Quad (FLUZONE HIGH DOSE) injection 0.7 mL  0.7 mL Intramuscular During Hospitalization Renetta Gatica II, DO       • insulin detemir (LEVEMIR) injection 10 Units  10 Units Subcutaneous Nightly Renetta Gatica II, DO   10 Units at 10/03/22 2031   • Insulin Lispro (humaLOG) injection 0-9 Units  0-9 Units Subcutaneous TID AC EnRenetta II, DO       • ipratropium-albuterol (DUO-NEB) nebulizer solution 3 mL  3 mL Nebulization Q4H PRN Renetta Gatica II, DO       • lactobacillus acidophilus (RISAQUAD) capsule 1 capsule  1 capsule Oral Daily EnRenetta II, DO   1 capsule at 10/04/22 0911   • levothyroxine (SYNTHROID, LEVOTHROID) tablet 75 mcg  75 mcg Oral Q AM EnRenetta II, DO   75 mcg at 10/04/22 0521   • linezolid (ZYVOX) tablet 600 mg  600 mg Oral Q12H Renetta Gatica II, DO   600 mg at 10/04/22 0912   • Magnesium Sulfate 2 gram infusion - Mg less than or equal to 1.5 mg/dL  2 g Intravenous PRN Sarah Pineda, APRN        Or   • Magnesium Sulfate 1 gram infusion - Mg  1.6-1.9 mg/dL  1 g Intravenous PRN Sarah Pineda, APRN       • melatonin tablet 5 mg  5 mg Oral Nightly PRN Sarah Pineda, APRN       • nitroglycerin (NITROSTAT) SL tablet 0.4 mg  0.4 mg Sublingual Q5 Min PRN Sarah Pineda, APRN       • ondansetron (ZOFRAN) tablet 4 mg  4 mg Oral Q6H PRN En, Renetta M II, DO        Or   • ondansetron (ZOFRAN) injection 4 mg  4 mg Intravenous Q6H PRN En, Renetta M II, DO   4 mg at 10/02/22 1509   • Pharmacy Consult - MTM   Does not apply Daily Mya Han, PharmD       • piperacillin-tazobactam (ZOSYN) 3.375 g in iso-osmotic dextrose 50 ml (premix)  3.375 g Intravenous Q8H En, Renetta M II, DO   3.375 g at 10/04/22 0925   • sodium chloride 0.9 % flush 10 mL  10 mL Intravenous PRN Sarah Pineda, APRN       • sodium chloride 0.9 % flush 10 mL  10 mL Intravenous Q12H Sarah Pineda, APRN   10 mL at 10/02/22 2029   • sodium chloride 0.9 % flush 10 mL  10 mL Intravenous PRN Sarah Pineda, APRN       • sodium chloride 0.9 % flush 10 mL  10 mL Intravenous Q12H En, Renetta M II, DO   10 mL at 10/04/22 0912   • sodium chloride 0.9 % flush 10 mL  10 mL Intravenous PRN En Renetta M II, DO       • sodium chloride nasal spray 1 spray  1 spray Nasal PRN En, Renetta M II, DO       • tamsulosin (FLOMAX) 24 hr capsule 0.4 mg  0.4 mg Oral Daily En, Renetta M II, DO   0.4 mg at 10/04/22 0911     Facility-Administered Medications Ordered in Other Encounters   Medication Dose Route Frequency Provider Last Rate Last Admin   • Chlorhexidine Gluconate Cloth 2 % pads 1 application  1 application Topical Q12H PRN Mohan Arauz, PA             Type 2 diabetes mellitus (HCC)    Hypertension    Hyperlipidemia    Hypothyroidism (acquired)    S/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 Au    SSS (sick sinus syndrome) (HCC)    [place] (acute kidney injury) (HCC)    Acute on chronic HFrEF (heart failure with reduced ejection fraction) ([facility])    Elevated troponin    Cellulitis of right lower  extremity        Impression      Systolic heart failure with subacute decompensation  Coronary artery disease with previous CABG February 2019  Nonspecific troponin evaluation with no classic rise and fall  Sick sinus syndrome [employer] pacemaker  Peripheral vascular disease status post amputation left great toe August 2022  History of bilateral lower extremity DVT August 2022  Chronic kidney disease  Hypertension    Lexiscan reveals anteroseptal scar with no significant ischemia.  LVEF 42%    Plan     Resume Eliquis  Continue diuresis  Medical therapy is indicated  I think he would benefit from an SSRI  I will check with Dr. Márquez to see if he can bear weight yet on his foot    Cooper Apodaca MD   10/04/22  10:06 EDT

## 2022-10-04 NOTE — PLAN OF CARE
Goal Outcome Evaluation:  Plan of Care Reviewed With: patient        Progress: no change  Outcome Evaluation: PT eval completed. Patient presents w/ generalized weakness, impaired balance, unsteady gait, and decreased functional endurance. Pt. WBAT LLE with offloading shoe per Dr. Márquez (per patient report). Issued new offloading shoe and donned prior to mobility. Pt. completed sit to stand transfers w/ RW and CGA, and ambulated 2ft w/ RW and CGA. Skilled IPPT warranted to improve pt's safety and independence w/ functional mobility. Recommend home w/ assist and HHPT at D/C.

## 2022-10-04 NOTE — PROGRESS NOTES
[facility] Medicine Services  PROGRESS NOTE    Patient Name: Jermaine Singh  : 1945  MRN: [id number]    Date of Admission: 10/2/2022  Primary Care Physician: Farooq Painter MD    Subjective   Subjective     CC:  Shortness of Air    HPI:  Patient sitting up in bed, stable on 4L NC. No new complaints, we discussed the need for ongoing diuresis and the patient's questions surrounding that. Patient states he continues to have trouble urinating and thinks it is related to his significant scrotal swelling.    ROS:  Gen- No fevers, chills  CV- No chest pain, palpitations  Resp- No cough, +dyspnea  GI- No N/V/D, abd pain  +scrotal edema    Objective   Objective     Vital Signs:   Temp:  [97.6 °F (36.4 °C)-98.6 °F (37 °C)] 97.6 °F (36.4 °C)  Heart Rate:  [63-73] 71  Resp:  [16-18] 18  BP: (100-117)/(66-73) 108/67  Flow (L/min):  [4] 4     Physical Exam:  Constitutional: No acute distress, awake, alert  HENT: NCAT, mucous membranes moist  Respiratory: Clear to auscultation bilaterally, respiratory effort normal on 4L NC  Cardiovascular: RRR, no murmurs, rubs, or gallops  Gastrointestinal: Positive bowel sounds, soft, nontender, nondistended  Musculoskeletal: BLE edema. RLE w/ redness/warmth from ankle to knee. Left toe amputation dressed.  Psychiatric: Appropriate affect, cooperative  Neurologic: Oriented x 3, strength symmetric in all extremities, Cranial Nerves grossly intact to confrontation, speech clear  Skin: No rashes    Results Reviewed:  LAB RESULTS:      Lab 10/04/22  0925 10/03/22  0038 10/02/22  0839 10/02/22  0332   WBC 5.65 5.13  --  5.50   HEMOGLOBIN 10.4* 9.7*  --  10.2*   HEMATOCRIT 32.9* 30.8*  --  32.4*   PLATELETS 153 131*  --  142   NEUTROS ABS  --  3.22  --  3.79   IMMATURE GRANS (ABS)  --  0.01  --  0.01   LYMPHS ABS  --  1.25  --  0.98   MONOS ABS  --  0.44  --  0.57   EOS ABS  --  0.15  --  0.11   MCV 93.2 93.6  --  93.4   SED RATE  --   --   --  6   CRP  --   --    --  0.40   PROCALCITONIN  --   --  0.08  --    LACTATE  --   --   --  1.9   PROTIME  --  17.9*  --   --    APTT  --  42.3*  --   --    HEPARIN ANTI-XA  --  >1.10*  --   --          Lab 10/04/22  0925 10/03/22  1044 10/02/22  0839 10/02/22  0332   SODIUM 141 141  --  140   POTASSIUM 5.1 5.1  --  4.8   CHLORIDE 104 105  --  103   CO2 26.0 26.0  --  24.0   ANION GAP 11.0 10.0  --  13.0   BUN 33* 33*  --  33*   CREATININE 2.13* 2.15*  --  2.18*   EGFR 31.3* 30.9*  --  30.4*   GLUCOSE 133* 110*  --  175*   CALCIUM 9.1 8.8  --  9.2   HEMOGLOBIN A1C  --   --  7.20*  --          Lab 10/04/22  0925 10/02/22  0332   TOTAL PROTEIN 5.8* 6.1   ALBUMIN 3.70 3.80   GLOBULIN 2.1 2.3   ALT (SGPT) 13 16   AST (SGOT) 10 18   BILIRUBIN 0.2 0.2   ALK PHOS 79 85         Lab 10/03/22  1044 10/03/22  0038 10/02/22  2205 10/02/22  2020 10/02/22  1435 10/02/22  0839 10/02/22  0332   PROBNP  --   --   --   --   --   --  25,781.0*   TROPONIN T 0.132*  --  0.151* 0.129* 0.107* 0.129* 0.139*   PROTIME  --  17.9*  --   --   --   --   --    INR  --  1.48*  --   --   --   --   --                  Brief Urine Lab Results  (Last result in the past 365 days)      Color   Clarity   Blood   Leuk Est   Nitrite   Protein   CREAT   Urine HCG        08/23/22 1309             59.2         08/23/22 1309 [place]   Cloudy   Large (3+)   Trace   Negative   30 mg/dL (1+)                 Microbiology Results Abnormal     Procedure Component Value - Date/Time    Blood Culture - Blood, Arm, Left [[id number]]  (Normal) Collected: 10/02/22 0433    Lab Status: Preliminary result Specimen: Blood from Arm, Left Updated: 10/04/22 0646     Blood Culture No growth at 2 days    Blood Culture - Blood, Arm, Left [[id number]]  (Normal) Collected: 10/02/22 0433    Lab Status: Preliminary result Specimen: Blood from Arm, Left Updated: 10/04/22 0646     Blood Culture No growth at 2 days          Stress Test With Myocardial Perfusion One Day    Result Date: 10/4/2022  · Patient  experienced nausea, lightheadedness, and dizziness after lexiscan injection. Pt reported chestt tightness in recovery rated 6/10 in the center of the chest. Symptoms improved with the administration of IV caffeine. · Paced with PVCs throughout the test. · Nonspecific ST-T changes in lead III in recovery. · Left ventricular ejection fraction is moderately reduced. (Calculated EF = 42%). · Stress and rest images reveal a fixed anteroseptal defect · No mismatched isotope defects are noted · Study is consistent with previous anterior wall myocardial infarction. · No significant ischemic distributions noted on the study        Results for orders placed during the hospital encounter of 07/27/22    Adult Transthoracic Echo Complete W/ Cont if Necessary Per Protocol    Interpretation Summary  · Left ventricular ejection fraction appears to be 46 - 50%. Left ventricular systolic function is low normal.  · Left ventricular septal hypokinesis  · No significant valvular heart disease      I have reviewed the medications:  Scheduled Meds:apixaban, 5 mg, Oral, Q12H  aspirin, 81 mg, Oral, Daily  atorvastatin, 20 mg, Oral, Nightly  bethanechol, 10 mg, Oral, TID  bumetanide, 1 mg, Intravenous, Daily  carvedilol, 3.125 mg, Oral, BID With Meals  famotidine, 20 mg, Oral, Daily  insulin detemir, 10 Units, Subcutaneous, Nightly  insulin lispro, 0-9 Units, Subcutaneous, TID AC  lactobacillus acidophilus, 1 capsule, Oral, Daily  levothyroxine, 75 mcg, Oral, Q AM  linezolid, 600 mg, Oral, Q12H  pharmacy consult - [place], , Does not apply, Daily  piperacillin-tazobactam, 3.375 g, Intravenous, Q8H  senna-docusate sodium, 2 tablet, Oral, BID  sodium chloride, 10 mL, Intravenous, Q12H  sodium chloride, 10 mL, Intravenous, Q12H  tamsulosin, 0.4 mg, Oral, Daily      Continuous Infusions:   PRN Meds:.•  acetaminophen **OR** acetaminophen **OR** acetaminophen  •  senna-docusate sodium **AND** polyethylene glycol **AND** bisacodyl **AND** bisacodyl  •   dextrose  •  dextrose  •  Diclofenac Sodium  •  glucagon (human recombinant)  •  HYDROcodone-acetaminophen  •  influenza vaccine  •  ipratropium-albuterol  •  magnesium sulfate **OR** magnesium sulfate in D5W 1g/100mL (PREMIX)  •  melatonin  •  nitroglycerin  •  ondansetron **OR** ondansetron  •  sodium chloride  •  sodium chloride  •  sodium chloride  •  sodium chloride    Assessment & Plan   Assessment & Plan     Active Hospital Problems    Diagnosis  POA   • Elevated troponin [R77.8]  Yes   • Cellulitis of right lower extremity [L03.115]  Yes   • [place] (acute kidney injury) ([facility]) [N17.9]  Yes   • Acute on chronic HFrEF (heart failure with reduced ejection fraction) ([facility]) [I50.23]  Yes   • SSS (sick sinus syndrome) ([facility]) [I49.5]  Yes   • Type 2 diabetes mellitus ([facility]) [E11.9]  Yes   • Hypertension [I10]  Yes   • Hyperlipidemia [E78.5]  Yes   • Hypothyroidism (acquired) [E03.9]  Yes   • S/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 Au [Z95.1]  Not Applicable      Resolved Hospital Problems    Diagnosis Date Resolved POA   • Angina at rest ([facility]) [I20.8] 10/02/2022 Yes        Brief Hospital Course to date:  Jermaine Singh is a 78 y/o male s/p recent toe amputation by Dr. Márquez presented to the ED with acute onset SOA/chest heaviness.    This patient's problems and plans were partially entered by my partner and updated as appropriate by me 10/04/22.     Hypoxia  A/C HFmrEF  Right pleural effusion  Elevated troponin  --Start IV bumex BID through the day and assess response. Stop his norvasc indefinitely so as not to worsen his LE edema and start Coreg. No ace/arb for now given his renal failure.  --Patient requests cardiology consult, follows with Dr. Apodaca.  --Elevated troponin is likely due to CHF in setting of his renal failure. No CP at this time and his troponin trended down  -- D/c heparin per Dr. Apodaca  -- Lexiscan stress test 10/3- reveals anteroseptal scar with no significant ischemia.  LVEF 42%  -- continue Bumex  1mg IV daily  -- pacemaker interrogated  -- resume Eliquis today  -- continue atorvastatin and carvedilol     Urinary retention  -- likely secondary to severe scrotal swelling d/t above  -- insert urinary catheter     RLE cellulitis  Recent Left toe amputation  --Previously on bactrim w/out improvement. Have taken picture and uploaded into Epic.  -- continue Zyvox/Zosyn   -- Dr. Márquez following  --PT wound care.     [place]  --Suspect mostly due to Bactrim use. Stopped Bactrim  -- stable at 2.1 with ongoing IV diuresis     DM2 w/ hyperglycemia  --Basal bolus insulin w/ SSI.    Depression/Anxiety  - start SSRI    Expected Discharge Location and Transportation: home  Expected Discharge Date: 10/6/22    DVT prophylaxis:  Medical DVT prophylaxis orders are present.     AM-PAC 6 Clicks Score (PT): 18 (10/04/22 3669)    CODE STATUS:   Code Status and Medical Interventions:   Ordered at: 10/02/22 0949     Code Status (Patient has no pulse and is not breathing):    CPR (Attempt to Resuscitate)     Medical Interventions (Patient has pulse or is breathing):    Full Support       Jacinta Daniel DO  10/04/22

## 2022-10-05 LAB
ALBUMIN SERPL-MCNC: 3.8 G/DL (ref 3.5–5.2)
ALBUMIN/GLOB SERPL: 1.7 G/DL
ALP SERPL-CCNC: 78 U/L (ref 39–117)
ALT SERPL W P-5'-P-CCNC: 13 U/L (ref 1–41)
ANION GAP SERPL CALCULATED.3IONS-SCNC: 12 MMOL/L (ref 5–15)
AST SERPL-CCNC: 9 U/L (ref 1–40)
BILIRUB SERPL-MCNC: 0.3 MG/DL (ref 0–1.2)
BUN SERPL-MCNC: 31 MG/DL (ref 8–23)
BUN/CREAT SERPL: 15.2 (ref 7–25)
CALCIUM SPEC-SCNC: 9.1 MG/DL (ref 8.6–10.5)
CHLORIDE SERPL-SCNC: 99 MMOL/L (ref 98–107)
CO2 SERPL-SCNC: 26 MMOL/L (ref 22–29)
CREAT SERPL-MCNC: 2.04 MG/DL (ref 0.76–1.27)
DEPRECATED RDW RBC AUTO: 47.1 FL (ref 37–54)
EGFRCR SERPLBLD CKD-EPI 2021: 32.9 ML/MIN/1.73
ERYTHROCYTE [DISTWIDTH] IN BLOOD BY AUTOMATED COUNT: 13.7 % (ref 12.3–15.4)
GLOBULIN UR ELPH-MCNC: 2.2 GM/DL
GLUCOSE BLDC GLUCOMTR-MCNC: 113 MG/DL (ref 70–130)
GLUCOSE BLDC GLUCOMTR-MCNC: 128 MG/DL (ref 70–130)
GLUCOSE BLDC GLUCOMTR-MCNC: 134 MG/DL (ref 70–130)
GLUCOSE SERPL-MCNC: 117 MG/DL (ref 65–99)
HCT VFR BLD AUTO: 35.9 % (ref 37.5–51)
HGB BLD-MCNC: 11 G/DL (ref 13–17.7)
MCH RBC QN AUTO: 28.7 PG (ref 26.6–33)
MCHC RBC AUTO-ENTMCNC: 30.6 G/DL (ref 31.5–35.7)
MCV RBC AUTO: 93.7 FL (ref 79–97)
PLATELET # BLD AUTO: 154 10*3/MM3 (ref 140–450)
PMV BLD AUTO: 12.4 FL (ref 6–12)
POTASSIUM SERPL-SCNC: 4.8 MMOL/L (ref 3.5–5.2)
PROT SERPL-MCNC: 6 G/DL (ref 6–8.5)
RBC # BLD AUTO: 3.83 10*6/MM3 (ref 4.14–5.8)
SODIUM SERPL-SCNC: 137 MMOL/L (ref 136–145)
WBC NRBC COR # BLD: 5.16 10*3/MM3 (ref 3.4–10.8)

## 2022-10-05 PROCEDURE — 80053 COMPREHEN METABOLIC PANEL: CPT | Performed by: HOSPITALIST

## 2022-10-05 PROCEDURE — 99024 POSTOP FOLLOW-UP VISIT: CPT | Performed by: THORACIC SURGERY (CARDIOTHORACIC VASCULAR SURGERY)

## 2022-10-05 PROCEDURE — 25010000002 PIPERACILLIN SOD-TAZOBACTAM PER 1 G: Performed by: INTERNAL MEDICINE

## 2022-10-05 PROCEDURE — 63710000001 INSULIN DETEMIR PER 5 UNITS: Performed by: INTERNAL MEDICINE

## 2022-10-05 PROCEDURE — 82962 GLUCOSE BLOOD TEST: CPT

## 2022-10-05 PROCEDURE — 99233 SBSQ HOSP IP/OBS HIGH 50: CPT | Performed by: INTERNAL MEDICINE

## 2022-10-05 PROCEDURE — 85027 COMPLETE CBC AUTOMATED: CPT | Performed by: HOSPITALIST

## 2022-10-05 RX ADMIN — ATORVASTATIN CALCIUM 20 MG: 20 TABLET, FILM COATED ORAL at 20:18

## 2022-10-05 RX ADMIN — APIXABAN 5 MG: 5 TABLET, FILM COATED ORAL at 09:03

## 2022-10-05 RX ADMIN — BETHANECHOL CHLORIDE 10 MG: 10 TABLET ORAL at 08:59

## 2022-10-05 RX ADMIN — Medication 1 CAPSULE: at 09:02

## 2022-10-05 RX ADMIN — SENNOSIDES AND DOCUSATE SODIUM 2 TABLET: 50; 8.6 TABLET ORAL at 20:18

## 2022-10-05 RX ADMIN — TAZOBACTAM SODIUM AND PIPERACILLIN SODIUM 3.38 G: 375; 3 INJECTION, SOLUTION INTRAVENOUS at 09:04

## 2022-10-05 RX ADMIN — BETHANECHOL CHLORIDE 10 MG: 10 TABLET ORAL at 18:01

## 2022-10-05 RX ADMIN — LINEZOLID 600 MG: 600 TABLET, FILM COATED ORAL at 20:18

## 2022-10-05 RX ADMIN — INSULIN DETEMIR 10 UNITS: 100 INJECTION, SOLUTION SUBCUTANEOUS at 20:18

## 2022-10-05 RX ADMIN — FAMOTIDINE 20 MG: 20 TABLET ORAL at 09:02

## 2022-10-05 RX ADMIN — Medication 10 ML: at 20:18

## 2022-10-05 RX ADMIN — BETHANECHOL CHLORIDE 10 MG: 10 TABLET ORAL at 20:18

## 2022-10-05 RX ADMIN — LEVOTHYROXINE SODIUM 75 MCG: 0.07 TABLET ORAL at 09:00

## 2022-10-05 RX ADMIN — APIXABAN 5 MG: 5 TABLET, FILM COATED ORAL at 20:18

## 2022-10-05 RX ADMIN — LINEZOLID 600 MG: 600 TABLET, FILM COATED ORAL at 09:01

## 2022-10-05 RX ADMIN — BUMETANIDE 1 MG: 0.25 INJECTION, SOLUTION INTRAMUSCULAR; INTRAVENOUS at 09:03

## 2022-10-05 RX ADMIN — TAZOBACTAM SODIUM AND PIPERACILLIN SODIUM 3.38 G: 375; 3 INJECTION, SOLUTION INTRAVENOUS at 02:00

## 2022-10-05 RX ADMIN — TAMSULOSIN HYDROCHLORIDE 0.4 MG: 0.4 CAPSULE ORAL at 08:56

## 2022-10-05 RX ADMIN — TAZOBACTAM SODIUM AND PIPERACILLIN SODIUM 3.38 G: 375; 3 INJECTION, SOLUTION INTRAVENOUS at 18:01

## 2022-10-05 RX ADMIN — CARVEDILOL 3.12 MG: 3.12 TABLET, FILM COATED ORAL at 18:01

## 2022-10-05 RX ADMIN — CARVEDILOL 3.12 MG: 3.12 TABLET, FILM COATED ORAL at 09:02

## 2022-10-05 RX ADMIN — ASPIRIN 81 MG CHEWABLE TABLET 81 MG: 81 TABLET CHEWABLE at 08:58

## 2022-10-05 NOTE — PLAN OF CARE
Goal Outcome Evaluation:  Plan of Care Reviewed With: patient        Progress: no change  Outcome Evaluation: VSS. VV to AV paced. RA. Pt nose bleeding this AM. O2 removed this AM. Off loading shoe utilized. Pt up in the chair for a few hours today. Wound care complete - silicone cream place on LLE per wound care order, left foot wound care complete, and skin care of coccyx complete with cream applied. Bath complete. Antibiotics adminstered. Scrotum supported with pillowcase sling. Pt complaining of pain and burning in right groin. No skin breakdown, wounds, or redness present. Pt refused to wear heel boots. Refusal of care related to wearing heel boots signed in the chart. Fall precautions in place.

## 2022-10-05 NOTE — PROGRESS NOTES
Cardiothoracic Surgery Progress Note      POD #: Left great toe amputation August 2, 2022     LOS: 3 days      Subjective: Awake and alert    Objective:  Vital Signs vital signs below noted T-max past 24 hours  98 °FTemp:  [97.5 °F (36.4 °C)-98 °F (36.7 °C)] 97.9 °F (36.6 °C)  Heart Rate:  [60-73] 60  Resp:  [18] 18  BP: (106-128)/(67-84) 128/84    Physical Exam:   General Appearance: Oriented x3   Lungs:   Heart:   Skin:   Incision: Amputation site has dry 6 x 6 Optifoam dressing.  No drainage noted     Results:  Results from last 7 days   Lab Units 10/04/22  0925   WBC 10*3/mm3 5.65   HEMOGLOBIN g/dL 10.4*   HEMATOCRIT % 32.9*   PLATELETS 10*3/mm3 153     Results from last 7 days   Lab Units 10/04/22  0925   SODIUM mmol/L 141   POTASSIUM mmol/L 5.1   CHLORIDE mmol/L 104   CO2 mmol/L 26.0   BUN mg/dL 33*   CREATININE mg/dL 2.13*   GLUCOSE mg/dL 133*   CALCIUM mg/dL 9.1         Assessment: #1.  Postop left great toe transmetatarsal amputation August 2, 2022 overall healed well with some minimal distal dehiscence  2.  Hammertoe phenomena left second toe over the middle phalangeal joint space with ulceration and probable joint involvement  #3 ischemic cardiomyopathy with heart failure.  4.  Status post coronary artery stenting    Plan: Overall medical manage per hospitalist.  Cardiology overseeing heart failure medical management.  Nursing service to change left great toe and ulceration of left second toe dressing daily with Betadine swabbing Optifoam dressing.      Gopal Márquez MD - 10/05/22 - 05:20 EDT

## 2022-10-05 NOTE — CASE MANAGEMENT/SOCIAL WORK
Continued Stay Note  [facility]     Patient Name: Jermaine Singh  MRN: [id number]  Today's Date: 10/5/2022    Admit Date: 10/2/2022    Plan:  update   Discharge Plan     Row Name 10/05/22 1447       Plan    Plan CM update    Plan Comments Plan remains for patient to return home with [facility]. Confirmed he is current with them. CM will send resumption of care orders closer to [place]. P:[phone] F: [phone]. IV diruesis continues and PT/OT/wound care following - CM will continue to follow- yariel [phone]    Final Discharge Disposition Code 06 - home with home health care               Discharge Codes    No documentation.                     Yariel Crenshaw RN

## 2022-10-05 NOTE — PROGRESS NOTES
[facility] Medicine Services  PROGRESS NOTE    Patient Name: Jermaine Singh  : 1945  MRN: [id number]    Date of Admission: 10/2/2022  Primary Care Physician: Farooq Painter MD    Subjective   Subjective     CC:  Shortness of Air    HPI:  Doing ok. Resting. Reports poor sleep with vivid bad dreams. Wife at bedside as well. Asking about his leg/spot on perineum and wound on his backside. Wife feels leg appears improved. D/w nursing as well    ROS:  Gen- No fevers, chills  CV- No chest pain, palpitations  Resp- No cough, +dyspnea  GI- No N/V/D, abd pain  +dreams (terrors)  +wounds as above    Objective   Objective     Vital Signs:   Temp:  [96.7 °F (35.9 °C)-98 °F (36.7 °C)] 96.7 °F (35.9 °C)  Heart Rate:  [60-68] 67  Resp:  [18] 18  BP: (106-128)/(67-84) 120/71  Flow (L/min):  [2-4] 2     Physical Exam:  Constitutional: No acute distress, awake, alert  HENT: NCAT, mucous membranes moist  Respiratory: Clear to auscultation bilaterally, respiratory effort normal on 2L NC  Cardiovascular: RRR, no murmurs, rubs, or gallops  Gastrointestinal: Positive bowel sounds, soft, nontender, nondistended  Musculoskeletal: BLE edema. RLE w/ redness/warmth from ankle to knee. Left toe amputation dressed. RLE with some cracking/blistering of skin. Wife reports it appears better  Psychiatric: Appropriate affect, cooperative  Neurologic: alert/oriented   Skin: No rashes  : scrotal edema noted, no wounds noted in perineal region    Results Reviewed:  LAB RESULTS:      Lab 10/05/22  0844 10/04/22  0925 10/03/22  0038 10/02/22  0839 10/02/22  0332   WBC 5.16 5.65 5.13  --  5.50   HEMOGLOBIN 11.0* 10.4* 9.7*  --  10.2*   HEMATOCRIT 35.9* 32.9* 30.8*  --  32.4*   PLATELETS 154 153 131*  --  142   NEUTROS ABS  --   --  3.22  --  3.79   IMMATURE GRANS (ABS)  --   --  0.01  --  0.01   LYMPHS ABS  --   --  1.25  --  0.98   MONOS ABS  --   --  0.44  --  0.57   EOS ABS  --   --  0.15  --  0.11   MCV 93.7 93.2  93.6  --  93.4   SED RATE  --   --   --   --  6   CRP  --   --   --   --  0.40   PROCALCITONIN  --   --   --  0.08  --    LACTATE  --   --   --   --  1.9   PROTIME  --   --  17.9*  --   --    APTT  --   --  42.3*  --   --    HEPARIN ANTI-XA  --   --  >1.10*  --   --          Lab 10/05/22  0845 10/04/22  0925 10/03/22  1044 10/02/22  0839 10/02/22  0332   SODIUM 137 141 141  --  140   POTASSIUM 4.8 5.1 5.1  --  4.8   CHLORIDE 99 104 105  --  103   CO2 26.0 26.0 26.0  --  24.0   ANION GAP 12.0 11.0 10.0  --  13.0   BUN 31* 33* 33*  --  33*   CREATININE 2.04* 2.13* 2.15*  --  2.18*   EGFR 32.9* 31.3* 30.9*  --  30.4*   GLUCOSE 117* 133* 110*  --  175*   CALCIUM 9.1 9.1 8.8  --  9.2   HEMOGLOBIN A1C  --   --   --  7.20*  --          Lab 10/05/22  0845 10/04/22  0925 10/02/22  0332   TOTAL PROTEIN 6.0 5.8* 6.1   ALBUMIN 3.80 3.70 3.80   GLOBULIN 2.2 2.1 2.3   ALT (SGPT) 13 13 16   AST (SGOT) 9 10 18   BILIRUBIN 0.3 0.2 0.2   ALK PHOS 78 79 85         Lab 10/03/22  1044 10/03/22  0038 10/02/22  2205 10/02/22  2020 10/02/22  1435 10/02/22  0839 10/02/22  0332   PROBNP  --   --   --   --   --   --  25,781.0*   TROPONIN T 0.132*  --  0.151* 0.129* 0.107* 0.129* 0.139*   PROTIME  --  17.9*  --   --   --   --   --    INR  --  1.48*  --   --   --   --   --                  Brief Urine Lab Results  (Last result in the past 365 days)      Color   Clarity   Blood   Leuk Est   Nitrite   Protein   CREAT   Urine HCG        08/23/22 1309             59.2         08/23/22 1309 [place]   Cloudy   Large (3+)   Trace   Negative   30 mg/dL (1+)                 Microbiology Results Abnormal     Procedure Component Value - Date/Time    Blood Culture - Blood, Arm, Left [[id number]]  (Normal) Collected: 10/02/22 0433    Lab Status: Preliminary result Specimen: Blood from Arm, Left Updated: 10/05/22 0647     Blood Culture No growth at 3 days    Blood Culture - Blood, Arm, Left [[id number]]  (Normal) Collected: 10/02/22 0433    Lab Status:  Preliminary result Specimen: Blood from Arm, Left Updated: 10/05/22 0647     Blood Culture No growth at 3 days          Stress Test With Myocardial Perfusion One Day    Result Date: 10/4/2022  · Patient experienced nausea, lightheadedness, and dizziness after lexiscan injection. Pt reported chestt tightness in recovery rated 6/10 in the center of the chest. Symptoms improved with the administration of IV caffeine. · Paced with PVCs throughout the test. · Nonspecific ST-T changes in lead III in recovery. · Left ventricular ejection fraction is moderately reduced. (Calculated EF = 42%). · Stress and rest images reveal a fixed anteroseptal defect · No mismatched isotope defects are noted · Study is consistent with previous anterior wall myocardial infarction. · No significant ischemic distributions noted on the study        Results for orders placed during the hospital encounter of 07/27/22    Adult Transthoracic Echo Complete W/ Cont if Necessary Per Protocol    Interpretation Summary  · Left ventricular ejection fraction appears to be 46 - 50%. Left ventricular systolic function is low normal.  · Left ventricular septal hypokinesis  · No significant valvular heart disease      I have reviewed the medications:  Scheduled Meds:apixaban, 5 mg, Oral, Q12H  aspirin, 81 mg, Oral, Daily  atorvastatin, 20 mg, Oral, Nightly  bethanechol, 10 mg, Oral, TID  bumetanide, 1 mg, Intravenous, Daily  carvedilol, 3.125 mg, Oral, BID With Meals  famotidine, 20 mg, Oral, Daily  insulin detemir, 10 Units, Subcutaneous, Nightly  insulin lispro, 0-9 Units, Subcutaneous, TID AC  lactobacillus acidophilus, 1 capsule, Oral, Daily  levothyroxine, 75 mcg, Oral, Q AM  linezolid, 600 mg, Oral, Q12H  pharmacy consult - MTM, , Does not apply, Daily  piperacillin-tazobactam, 3.375 g, Intravenous, Q8H  senna-docusate sodium, 2 tablet, Oral, BID  sodium chloride, 10 mL, Intravenous, Q12H  sodium chloride, 10 mL, Intravenous, Q12H  tamsulosin, 0.4 mg,  Oral, Daily      Continuous Infusions:   PRN Meds:.•  acetaminophen **OR** acetaminophen **OR** acetaminophen  •  senna-docusate sodium **AND** polyethylene glycol **AND** bisacodyl **AND** bisacodyl  •  dextrose  •  dextrose  •  Diclofenac Sodium  •  glucagon (human recombinant)  •  HYDROcodone-acetaminophen  •  influenza vaccine  •  ipratropium-albuterol  •  magnesium sulfate **OR** magnesium sulfate in D5W 1g/100mL (PREMIX)  •  nitroglycerin  •  ondansetron **OR** ondansetron  •  sodium chloride  •  sodium chloride  •  sodium chloride  •  sodium chloride    Assessment & Plan   Assessment & Plan     Active Hospital Problems    Diagnosis  POA   • Elevated troponin [R77.8]  Yes   • Cellulitis of right lower extremity [L03.115]  Yes   • [place] (acute kidney injury) ([facility]) [N17.9]  Yes   • Acute on chronic HFrEF (heart failure with reduced ejection fraction) ([facility]) [I50.23]  Yes   • SSS (sick sinus syndrome) ([facility]) [I49.5]  Yes   • Type 2 diabetes mellitus ([facility]) [E11.9]  Yes   • Hypertension [I10]  Yes   • Hyperlipidemia [E78.5]  Yes   • Hypothyroidism (acquired) [E03.9]  Yes   • S/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 Au [Z95.1]  Not Applicable      Resolved Hospital Problems    Diagnosis Date Resolved POA   • Angina at rest ([facility]) [I20.8] 10/02/2022 Yes        Brief Hospital Course to date:  Jermaine Singh is a 76 y/o male s/p recent toe amputation by Dr. Márquez presented to the ED with acute onset SOA/chest heaviness.    This patient's problems and plans were partially entered by my partner and updated as appropriate by me 10/05/22.     Hypoxia  A/C HFmrEF  Right pleural effusion  Elevated troponin  --Patient requests cardiology consult, follows with Dr. Apodaca, they are following  --Elevated troponin is likely due to CHF in setting of his renal failure. No CP at this time and his troponin trended down  -- D/c heparin per Dr. Apodaca  -- Lexiscan stress test 10/3- reveals anteroseptal scar with no significant ischemia.   LVEF 42%  -- continue Bumex 1mg IV daily  -- pacemaker interrogated  -- resume Eliquis today  -- continue atorvastatin and carvedilol     Urinary retention  -- likely secondary to severe scrotal swelling d/t above  -- monitor with scrotal support  -- patient refused catheter ordered yesterday, will monitor      RLE cellulitis  Recent Left toe amputation  --Previously on bactrim w/out improvement.   -- continue Zyvox/Zosyn   -- Dr. Márquez following  --PT wound care.     [place]  --Suspect mostly due to Bactrim use. Stopped Bactrim  -- stable at 2.1 with ongoing IV diuresis     DM2 w/ hyperglycemia  --Basal bolus insulin w/ SSI.    Depression/Anxiety  - start SSRI    Expected Discharge Location and Transportation: home with wife  Expected Discharge Date: 10/7/22    DVT prophylaxis:  Medical DVT prophylaxis orders are present.     AM-PAC 6 Clicks Score (PT): 17 (10/05/22 0843)    CODE STATUS:   Code Status and Medical Interventions:   Ordered at: 10/02/22 0949     Code Status (Patient has no pulse and is not breathing):    CPR (Attempt to Resuscitate)     Medical Interventions (Patient has pulse or is breathing):    Full Support       Jenna Campbell MD  10/05/22

## 2022-10-05 NOTE — NURSING NOTE
"WOC consult for \"Has wound in groin   Cellulitic leg wondering if ointment would help   Bedsore on back\"    Entered patient's room, patient on bedside commode with new offloading boot in place, nurse at bedside.    Unable to find wound in groin, patient pointed to where it hurt, both groins were thoroughly assessed.  Could be patient's significant scrotal edema causing him pain.  Nurse has been slinging and offloading scrotum effectively.    Nurse at bedside stated bottom no different than assessment that I did a few days ago.  Continue to apply barrier cream per order.  Offload every 2 hours.    Dr. Márquez has given orders for left amputation site care to be done daily.    Right lower extremity cellulitis, appears slightly less inflamed although leg is more edematous than right.  Skin now with mild blistering and peeling flakiness.  Encouraged nursing to place a silicone Hydro guard lotion.    WOC will continue to follow, please reconsult if new needs arise   "

## 2022-10-05 NOTE — PROGRESS NOTES
"                                 [facility] Progress Note      LOS: 3 days   Patient Care Team:  Farooq Painter MD as PCP - General (Family Medicine)  Jermaine Hernandez MD as Consulting Physician (Cardiology)    Chief Complaint:    Chief Complaint   Patient presents with   • Shortness of Breath       Subjective     Interval History: Breathing improved    Patient Complaints: Seems more relaxed today.  Spirits are improved    Review of Systems:   A 14 point review of systems was negative except as was stated in the HPI      Objective     Vital Sign Min/Max for last 24 hours  Temp  Min: 96.7 °F (35.9 °C)  Max: 98 °F (36.7 °C)   BP  Min: 106/67  Max: 128/84   Pulse  Min: 60  Max: 67   Resp  Min: 18  Max: 18   SpO2  Min: 92 %  Max: 98 %   Flow (L/min)  Min: 2  Max: 4   Weight  Min: 113 kg (250 lb)  Max: 113 kg (250 lb)     Flowsheet Rows    Flowsheet Row First Filed Value   Admission Height 190.5 cm (75\") Documented at 10/02/2022 0306   Admission Weight 103 kg (227 lb) Documented at 10/02/2022 0306          Physical Exam:  General Appearance: Alert, appears stated age and cooperative  Lungs: Clear to auscultation  Heart:: RRR   No Murmurs, Rubs or Gallops  Abdomen: Soft and nontender with adequate bowel sounds.  No organomegaly  Extremities: Left foot bandaged.  Erythematous cellulitic changes right leg  Pulses: Pulses palpable and equal bilaterally  Skin: Warm and dry with no rash  Psych: Normal     Results Review:     I reviewed the patient's new clinical results.  Results from last 7 days   Lab Units 10/04/22  0925 10/03/22  1044 10/02/22  0332   SODIUM mmol/L 141 141 140   POTASSIUM mmol/L 5.1 5.1 4.8   CHLORIDE mmol/L 104 105 103   CO2 mmol/L 26.0 26.0 24.0   BUN mg/dL 33* 33* 33*   CREATININE mg/dL 2.13* 2.15* 2.18*   GLUCOSE mg/dL 133* 110* 175*   CALCIUM mg/dL 9.1 8.8 9.2     Results from last 7 days   Lab Units 10/05/22  0844 10/04/22  0925 10/03/22  0038   WBC 10*3/mm3 5.16 5.65 5.13 "   HEMOGLOBIN g/dL 11.0* 10.4* 9.7*   HEMATOCRIT % 35.9* 32.9* 30.8*   PLATELETS 10*3/mm3 154 153 131*     Lab Results   Lab Value Date/Time    TROPONINT 0.132 (C) 10/03/2022 1044    TROPONINT 0.151 (C) 10/02/2022 2205    TROPONINT 0.129 (C) 10/02/2022 2020    TROPONINT 0.107 (C) 10/02/2022 1435    TROPONINT 0.129 (C) 10/02/2022 0839    TROPONINT 0.139 (C) 10/02/2022 0332    TROPONINT 0.230 (C) 08/13/2022 2310    TROPONINT 0.229 (C) 08/13/2022 1224    TROPONINT 0.230 (C) 08/13/2022 0730    TROPONINT 0.206 (C) 08/12/2022 1653    TROPONINT 0.157 (C) 08/12/2022 1025    TROPONINT 0.146 (C) 08/11/2022 0636    TROPONINT 0.129 (C) 08/10/2022 2239    TROPONINT 0.108 (C) 08/10/2022 1638    TROPONINT 0.013 10/20/2021 1705    TROPONINT <0.010 10/20/2021 1508         Results from last 7 days   Lab Units 10/03/22  0038   INR  1.48*               Medication Review: yes  Current Facility-Administered Medications   Medication Dose Route Frequency Provider Last Rate Last Admin   • acetaminophen (TYLENOL) tablet 650 mg  650 mg Oral Q4H PRN Renetta Gatica II, DO   650 mg at 10/04/22 2150    Or   • acetaminophen (TYLENOL) 160 MG/5ML solution 650 mg  650 mg Oral Q4H PRN Renetta Gatica II, DO        Or   • acetaminophen (TYLENOL) suppository 650 mg  650 mg Rectal Q4H PRN Renetta Gatica II, DO       • apixaban (ELIQUIS) tablet 5 mg  5 mg Oral Q12H Jacinta Daniel DO   5 mg at 10/05/22 0903   • aspirin chewable tablet 81 mg  81 mg Oral Daily Renetta Gatica II, DO   81 mg at 10/05/22 0858   • atorvastatin (LIPITOR) tablet 20 mg  20 mg Oral Nightly Sarah Pineda APRN   20 mg at 10/04/22 2150   • bethanechol (URECHOLINE) tablet 10 mg  10 mg Oral TID Renetta Gatica II, DO   10 mg at 10/05/22 0859   • sennosides-docusate (PERICOLACE) 8.6-50 MG per tablet 2 tablet  2 tablet Oral BID Renetta Gatica II, DO   2 tablet at 10/04/22 2150    And   • polyethylene glycol (MIRALAX) packet 17 g  17 g Oral Daily PRN Renetta Gatica II, DO         And   • bisacodyl (DULCOLAX) EC tablet 5 mg  5 mg Oral Daily PRN Renetta Gatica M II, DO        And   • bisacodyl (DULCOLAX) suppository 10 mg  10 mg Rectal Daily PRN EnRenetta II, DO       • bumetanide (BUMEX) injection 1 mg  1 mg Intravenous Daily Jacinta Daniel, DO   1 mg at 10/05/22 0903   • carvedilol (COREG) tablet 3.125 mg  3.125 mg Oral BID With Meals EnRenetta M II, DO   3.125 mg at 10/05/22 0902   • dextrose (D50W) (25 g/50 mL) IV injection 25 g  25 g Intravenous Q15 Min PRN Renetta Gatica M II, DO       • dextrose (GLUTOSE) oral gel 15 g  15 g Oral Q15 Min PRN EnRenetta M II, DO       • Diclofenac Sodium (VOLTAREN) 1 % gel 2 g  2 g Topical 4x Daily PRN Renetta Gatica M II, DO       • famotidine (PEPCID) tablet 20 mg  20 mg Oral Daily BrattonRenetta M II, DO   20 mg at 10/05/22 0902   • glucagon (human recombinant) (GLUCAGEN DIAGNOSTIC) injection 1 mg  1 mg Intramuscular Q15 Min PRN Renetta Gatica II, DO       • HYDROcodone-acetaminophen (NORCO) 5-325 MG per tablet 1 tablet  1 tablet Oral Q4H PRN Renetta Gatica M II, DO   1 tablet at 10/04/22 0137   • Influenza Vac High-Dose Quad (FLUZONE HIGH DOSE) injection 0.7 mL  0.7 mL Intramuscular During Hospitalization EnRenetta II, DO       • insulin detemir (LEVEMIR) injection 10 Units  10 Units Subcutaneous Nightly EnRenetta II, DO   10 Units at 10/04/22 2155   • Insulin Lispro (humaLOG) injection 0-9 Units  0-9 Units Subcutaneous TID AC EnRenetta M II, DO       • ipratropium-albuterol (DUO-NEB) nebulizer solution 3 mL  3 mL Nebulization Q4H PRN Renetta Gatica II, DO       • lactobacillus acidophilus (RISAQUAD) capsule 1 capsule  1 capsule Oral Daily Renetta Gatica II, DO   1 capsule at 10/05/22 0902   • levothyroxine (SYNTHROID, LEVOTHROID) tablet 75 mcg  75 mcg Oral Q AM Renetta Gatica II, DO   75 mcg at 10/05/22 0900   • linezolid (ZYVOX) tablet 600 mg  600 mg Oral Q12H Renetta Gatica II, DO   600 mg at  10/05/22 0901   • Magnesium Sulfate 2 gram infusion - Mg less than or equal to 1.5 mg/dL  2 g Intravenous PRN Sarah Pineda, APRN        Or   • Magnesium Sulfate 1 gram infusion - Mg 1.6-1.9 mg/dL  1 g Intravenous PRN Sarah Pineda, APRN       • nitroglycerin (NITROSTAT) SL tablet 0.4 mg  0.4 mg Sublingual Q5 Min PRN Sarah Pineda APRN       • ondansetron (ZOFRAN) tablet 4 mg  4 mg Oral Q6H PRN Renetta Gatica M II, DO        Or   • ondansetron (ZOFRAN) injection 4 mg  4 mg Intravenous Q6H PRN Renetta Gatica II, DO   4 mg at 10/02/22 1509   • Pharmacy Consult - MTM   Does not apply Daily Mya Han, PharmD       • piperacillin-tazobactam (ZOSYN) 3.375 g in iso-osmotic dextrose 50 ml (premix)  3.375 g Intravenous Q8H Renetta Gatica II, DO   3.375 g at 10/05/22 0904   • sodium chloride 0.9 % flush 10 mL  10 mL Intravenous PRN Sarah Pineda, APRN       • sodium chloride 0.9 % flush 10 mL  10 mL Intravenous Q12H Sarah Pineda APRN   10 mL at 10/02/22 2029   • sodium chloride 0.9 % flush 10 mL  10 mL Intravenous PRN Sarah Pineda, APRN       • sodium chloride 0.9 % flush 10 mL  10 mL Intravenous Q12H Renetta Gatica M II, DO   10 mL at 10/04/22 0912   • sodium chloride 0.9 % flush 10 mL  10 mL Intravenous PRN Renetta Gatica M II, DO       • sodium chloride nasal spray 1 spray  1 spray Nasal PRN Renetta Gatica M II, DO       • tamsulosin (FLOMAX) 24 hr capsule 0.4 mg  0.4 mg Oral Daily Renetta Gatica II, DO   0.4 mg at 10/05/22 0856     Facility-Administered Medications Ordered in Other Encounters   Medication Dose Route Frequency Provider Last Rate Last Admin   • Chlorhexidine Gluconate Cloth 2 % pads 1 application  1 application Topical Q12H PRN Mohan Arauz, PA             Type 2 diabetes mellitus (HCC)    Hypertension    Hyperlipidemia    Hypothyroidism (acquired)    S/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 Au    SSS (sick sinus syndrome) (HCC)    [place] (acute kidney injury) (HCC)    Acute on  chronic HFrEF (heart failure with reduced ejection fraction) (HCC)    Elevated troponin    Cellulitis of right lower extremity        Impression      Systolic heart failure with subacute decompensation--improved  Coronary artery disease with previous CABG February 2019  Nonspecific troponin evaluation with no classic rise and fall  Sick sinus syndrome [employer] pacemaker  Peripheral vascular disease status post amputation left great toe August 2022  History of bilateral lower extremity DVT August 2022  Chronic kidney disease--stable  Hypertension    Lexiscan reveals anteroseptal scar with no significant ischemia.  LVEF 42%    Plan     Continue present medications.  Will defer ACE ARB and Entresto for the time being secondary to kidney disease    Cooper Apodaca MD   10/05/22  09:41 EDT

## 2022-10-06 LAB
ALBUMIN SERPL-MCNC: 3.6 G/DL (ref 3.5–5.2)
ALBUMIN/GLOB SERPL: 1.8 G/DL
ALP SERPL-CCNC: 68 U/L (ref 39–117)
ALT SERPL W P-5'-P-CCNC: 11 U/L (ref 1–41)
ANION GAP SERPL CALCULATED.3IONS-SCNC: 11 MMOL/L (ref 5–15)
AST SERPL-CCNC: 7 U/L (ref 1–40)
BASOPHILS # BLD AUTO: 0.05 10*3/MM3 (ref 0–0.2)
BASOPHILS NFR BLD AUTO: 1 % (ref 0–1.5)
BILIRUB SERPL-MCNC: 0.2 MG/DL (ref 0–1.2)
BUN SERPL-MCNC: 29 MG/DL (ref 8–23)
BUN/CREAT SERPL: 15.6 (ref 7–25)
CALCIUM SPEC-SCNC: 9 MG/DL (ref 8.6–10.5)
CHLORIDE SERPL-SCNC: 102 MMOL/L (ref 98–107)
CO2 SERPL-SCNC: 27 MMOL/L (ref 22–29)
CREAT SERPL-MCNC: 1.86 MG/DL (ref 0.76–1.27)
DEPRECATED RDW RBC AUTO: 46.9 FL (ref 37–54)
EGFRCR SERPLBLD CKD-EPI 2021: 36.8 ML/MIN/1.73
EOSINOPHIL # BLD AUTO: 0.27 10*3/MM3 (ref 0–0.4)
EOSINOPHIL NFR BLD AUTO: 5.5 % (ref 0.3–6.2)
ERYTHROCYTE [DISTWIDTH] IN BLOOD BY AUTOMATED COUNT: 13.6 % (ref 12.3–15.4)
GLOBULIN UR ELPH-MCNC: 2 GM/DL
GLUCOSE BLDC GLUCOMTR-MCNC: 105 MG/DL (ref 70–130)
GLUCOSE BLDC GLUCOMTR-MCNC: 131 MG/DL (ref 70–130)
GLUCOSE BLDC GLUCOMTR-MCNC: 139 MG/DL (ref 70–130)
GLUCOSE BLDC GLUCOMTR-MCNC: 151 MG/DL (ref 70–130)
GLUCOSE BLDC GLUCOMTR-MCNC: 86 MG/DL (ref 70–130)
GLUCOSE SERPL-MCNC: 85 MG/DL (ref 65–99)
HCT VFR BLD AUTO: 34.3 % (ref 37.5–51)
HGB BLD-MCNC: 10.8 G/DL (ref 13–17.7)
IMM GRANULOCYTES # BLD AUTO: 0.01 10*3/MM3 (ref 0–0.05)
IMM GRANULOCYTES NFR BLD AUTO: 0.2 % (ref 0–0.5)
LYMPHOCYTES # BLD AUTO: 1.12 10*3/MM3 (ref 0.7–3.1)
LYMPHOCYTES NFR BLD AUTO: 22.7 % (ref 19.6–45.3)
MCH RBC QN AUTO: 29.3 PG (ref 26.6–33)
MCHC RBC AUTO-ENTMCNC: 31.5 G/DL (ref 31.5–35.7)
MCV RBC AUTO: 93 FL (ref 79–97)
MONOCYTES # BLD AUTO: 0.55 10*3/MM3 (ref 0.1–0.9)
MONOCYTES NFR BLD AUTO: 11.1 % (ref 5–12)
NEUTROPHILS NFR BLD AUTO: 2.94 10*3/MM3 (ref 1.7–7)
NEUTROPHILS NFR BLD AUTO: 59.5 % (ref 42.7–76)
NRBC BLD AUTO-RTO: 0 /100 WBC (ref 0–0.2)
PLATELET # BLD AUTO: 152 10*3/MM3 (ref 140–450)
PMV BLD AUTO: 12.8 FL (ref 6–12)
POTASSIUM SERPL-SCNC: 4.7 MMOL/L (ref 3.5–5.2)
PROT SERPL-MCNC: 5.6 G/DL (ref 6–8.5)
RBC # BLD AUTO: 3.69 10*6/MM3 (ref 4.14–5.8)
SODIUM SERPL-SCNC: 140 MMOL/L (ref 136–145)
WBC NRBC COR # BLD: 4.94 10*3/MM3 (ref 3.4–10.8)

## 2022-10-06 PROCEDURE — 80053 COMPREHEN METABOLIC PANEL: CPT | Performed by: INTERNAL MEDICINE

## 2022-10-06 PROCEDURE — 85025 COMPLETE CBC W/AUTO DIFF WBC: CPT | Performed by: INTERNAL MEDICINE

## 2022-10-06 PROCEDURE — 25010000002 PIPERACILLIN SOD-TAZOBACTAM PER 1 G: Performed by: INTERNAL MEDICINE

## 2022-10-06 PROCEDURE — 82962 GLUCOSE BLOOD TEST: CPT

## 2022-10-06 PROCEDURE — 99231 SBSQ HOSP IP/OBS SF/LOW 25: CPT | Performed by: NURSE PRACTITIONER

## 2022-10-06 RX ORDER — DOXYCYCLINE 100 MG/1
100 CAPSULE ORAL EVERY 12 HOURS SCHEDULED
Status: DISCONTINUED | OUTPATIENT
Start: 2022-10-06 | End: 2022-10-11 | Stop reason: HOSPADM

## 2022-10-06 RX ADMIN — LINEZOLID 600 MG: 600 TABLET, FILM COATED ORAL at 08:34

## 2022-10-06 RX ADMIN — APIXABAN 5 MG: 5 TABLET, FILM COATED ORAL at 20:33

## 2022-10-06 RX ADMIN — FAMOTIDINE 20 MG: 20 TABLET ORAL at 08:33

## 2022-10-06 RX ADMIN — SERTRALINE HYDROCHLORIDE 25 MG: 50 TABLET ORAL at 20:33

## 2022-10-06 RX ADMIN — Medication 10 ML: at 08:39

## 2022-10-06 RX ADMIN — BUMETANIDE 1 MG: 0.25 INJECTION, SOLUTION INTRAMUSCULAR; INTRAVENOUS at 08:35

## 2022-10-06 RX ADMIN — ACETAMINOPHEN 650 MG: 325 TABLET ORAL at 08:55

## 2022-10-06 RX ADMIN — BETHANECHOL CHLORIDE 10 MG: 10 TABLET ORAL at 08:34

## 2022-10-06 RX ADMIN — CARVEDILOL 3.12 MG: 3.12 TABLET, FILM COATED ORAL at 17:15

## 2022-10-06 RX ADMIN — TAZOBACTAM SODIUM AND PIPERACILLIN SODIUM 3.38 G: 375; 3 INJECTION, SOLUTION INTRAVENOUS at 02:42

## 2022-10-06 RX ADMIN — Medication 10 ML: at 20:34

## 2022-10-06 RX ADMIN — SENNOSIDES AND DOCUSATE SODIUM 2 TABLET: 50; 8.6 TABLET ORAL at 08:33

## 2022-10-06 RX ADMIN — ATORVASTATIN CALCIUM 20 MG: 20 TABLET, FILM COATED ORAL at 20:33

## 2022-10-06 RX ADMIN — TAMSULOSIN HYDROCHLORIDE 0.4 MG: 0.4 CAPSULE ORAL at 08:34

## 2022-10-06 RX ADMIN — BETHANECHOL CHLORIDE 10 MG: 10 TABLET ORAL at 17:15

## 2022-10-06 RX ADMIN — TAZOBACTAM SODIUM AND PIPERACILLIN SODIUM 3.38 G: 375; 3 INJECTION, SOLUTION INTRAVENOUS at 17:16

## 2022-10-06 RX ADMIN — CARVEDILOL 3.12 MG: 3.12 TABLET, FILM COATED ORAL at 08:33

## 2022-10-06 RX ADMIN — TAZOBACTAM SODIUM AND PIPERACILLIN SODIUM 3.38 G: 375; 3 INJECTION, SOLUTION INTRAVENOUS at 09:46

## 2022-10-06 RX ADMIN — DOXYCYCLINE 100 MG: 100 CAPSULE ORAL at 20:33

## 2022-10-06 RX ADMIN — APIXABAN 5 MG: 5 TABLET, FILM COATED ORAL at 08:34

## 2022-10-06 RX ADMIN — Medication 10 ML: at 08:35

## 2022-10-06 RX ADMIN — Medication 1 CAPSULE: at 08:33

## 2022-10-06 RX ADMIN — LEVOTHYROXINE SODIUM 75 MCG: 0.07 TABLET ORAL at 06:04

## 2022-10-06 RX ADMIN — ASPIRIN 81 MG CHEWABLE TABLET 81 MG: 81 TABLET CHEWABLE at 08:33

## 2022-10-06 NOTE — PLAN OF CARE
Goal Outcome Evaluation:  Plan of Care Reviewed With: patient           Outcome Evaluation: VSS on room air, no c/o pain or nausea, pt verbalized multiple times that he wishes there was something he could take to help him sleep, past medications he tried caused terrible nightmares for him. Pt appears to be sleeping at this time.

## 2022-10-06 NOTE — PROGRESS NOTES
[facility] Medicine Services  PROGRESS NOTE    Patient Name: Jermaine Singh  : 1945  MRN: [id number]    Date of Admission: 10/2/2022  Primary Care Physician: Farooq Painter MD    Subjective   Subjective     CC:  Shortness of Air    HPI:  Patient sitting in the chair, reporting trouble sleeping last night.  Offered him options for insomnia; however, patient states all those medications make him hyper.  Patient denies headache, nausea chest pain.    ROS:  Gen- No fevers, chills  CV- No chest pain, palpitations  Resp- No cough, dyspnea  GI- No N/V/D, abd pain  Psych- (+) insomnia    Objective   Objective     Vital Signs:   Temp:  [97.4 °F (36.3 °C)-97.9 °F (36.6 °C)] 97.6 °F (36.4 °C)  Heart Rate:  [55-74] 55  Resp:  [16-18] 16  BP: (111-125)/(73-79) 112/74     Physical Exam:  Constitutional: Awake, alert, NAD  HENT: NCAT, mucous membranes moist  Respiratory: Clear to auscultation bilaterally, nonlabored respirations RA 95 %  Cardiovascular: RRR, no murmurs, rubs, or gallops HR 63  Gastrointestinal: Positive bowel sounds, soft, nontender, nondistended  - Scrotal edema improving  Musculoskeletal: BLE edema, RLE with erythema-warm to touch. Left toe with dsg CDI form recent amputation in August. RLE excessively dry skin.   Psychiatric: Appropriate affect, cooperative  Neurologic: Oriented x 3, no neuro focal deficit, speech clear  Skin: No rashes    Results Reviewed:  LAB RESULTS:      Lab 10/06/22  0716 10/05/22  0844 10/04/22  0925 10/03/22  0038 10/02/22  0839 10/02/22  0332   WBC 4.94 5.16 5.65 5.13  --  5.50   HEMOGLOBIN 10.8* 11.0* 10.4* 9.7*  --  10.2*   HEMATOCRIT 34.3* 35.9* 32.9* 30.8*  --  32.4*   PLATELETS 152 154 153 131*  --  142   NEUTROS ABS 2.94  --   --  3.22  --  3.79   IMMATURE GRANS (ABS) 0.01  --   --  0.01  --  0.01   LYMPHS ABS 1.12  --   --  1.25  --  0.98   MONOS ABS 0.55  --   --  0.44  --  0.57   EOS ABS 0.27  --   --  0.15  --  0.11   MCV 93.0 93.7 93.2  93.6  --  93.4   SED RATE  --   --   --   --   --  6   CRP  --   --   --   --   --  0.40   PROCALCITONIN  --   --   --   --  0.08  --    LACTATE  --   --   --   --   --  1.9   PROTIME  --   --   --  17.9*  --   --    APTT  --   --   --  42.3*  --   --    HEPARIN ANTI-XA  --   --   --  >1.10*  --   --          Lab 10/06/22  0716 10/05/22  0845 10/04/22  0925 10/03/22  1044 10/02/22  0839 10/02/22  0332   SODIUM 140 137 141 141  --  140   POTASSIUM 4.7 4.8 5.1 5.1  --  4.8   CHLORIDE 102 99 104 105  --  103   CO2 27.0 26.0 26.0 26.0  --  24.0   ANION GAP 11.0 12.0 11.0 10.0  --  13.0   BUN 29* 31* 33* 33*  --  33*   CREATININE 1.86* 2.04* 2.13* 2.15*  --  2.18*   EGFR 36.8* 32.9* 31.3* 30.9*  --  30.4*   GLUCOSE 85 117* 133* 110*  --  175*   CALCIUM 9.0 9.1 9.1 8.8  --  9.2   HEMOGLOBIN A1C  --   --   --   --  7.20*  --          Lab 10/06/22  0716 10/05/22  0845 10/04/22  0925 10/02/22  0332   TOTAL PROTEIN 5.6* 6.0 5.8* 6.1   ALBUMIN 3.60 3.80 3.70 3.80   GLOBULIN 2.0 2.2 2.1 2.3   ALT (SGPT) 11 13 13 16   AST (SGOT) 7 9 10 18   BILIRUBIN 0.2 0.3 0.2 0.2   ALK PHOS 68 78 79 85         Lab 10/03/22  1044 10/03/22  0038 10/02/22  2205 10/02/22  2020 10/02/22  1435 10/02/22  0839 10/02/22  0332   PROBNP  --   --   --   --   --   --  25,781.0*   TROPONIN T 0.132*  --  0.151* 0.129* 0.107* 0.129* 0.139*   PROTIME  --  17.9*  --   --   --   --   --    INR  --  1.48*  --   --   --   --   --                  Brief Urine Lab Results  (Last result in the past 365 days)      Color   Clarity   Blood   Leuk Est   Nitrite   Protein   CREAT   Urine HCG        08/23/22 1309             59.2         08/23/22 1309 [place]   Cloudy   Large (3+)   Trace   Negative   30 mg/dL (1+)                 Microbiology Results Abnormal     Procedure Component Value - Date/Time    Blood Culture - Blood, Arm, Left [[id number]]  (Normal) Collected: 10/02/22 3475    Lab Status: Preliminary result Specimen: Blood from Arm, Left Updated: 10/06/22 5379      Blood Culture No growth at 4 days    Blood Culture - Blood, Arm, Left [[id number]]  (Normal) Collected: 10/02/22 0433    Lab Status: Preliminary result Specimen: Blood from Arm, Left Updated: 10/06/22 0647     Blood Culture No growth at 4 days          No radiology results from the last 24 hrs    Results for orders placed during the hospital encounter of 07/27/22    Adult Transthoracic Echo Complete W/ Cont if Necessary Per Protocol    Interpretation Summary  · Left ventricular ejection fraction appears to be 46 - 50%. Left ventricular systolic function is low normal.  · Left ventricular septal hypokinesis  · No significant valvular heart disease      I have reviewed the medications:  Scheduled Meds:apixaban, 5 mg, Oral, Q12H  aspirin, 81 mg, Oral, Daily  atorvastatin, 20 mg, Oral, Nightly  bethanechol, 10 mg, Oral, TID  bumetanide, 1 mg, Intravenous, Daily  carvedilol, 3.125 mg, Oral, BID With Meals  doxycycline, 100 mg, Oral, Q12H  famotidine, 20 mg, Oral, Daily  insulin detemir, 10 Units, Subcutaneous, Nightly  insulin lispro, 0-9 Units, Subcutaneous, TID AC  lactobacillus acidophilus, 1 capsule, Oral, Daily  levothyroxine, 75 mcg, Oral, Q AM  pharmacy consult - MTM, , Does not apply, Daily  piperacillin-tazobactam, 3.375 g, Intravenous, Q8H  senna-docusate sodium, 2 tablet, Oral, BID  sertraline, 25 mg, Oral, Nightly  sodium chloride, 10 mL, Intravenous, Q12H  sodium chloride, 10 mL, Intravenous, Q12H  tamsulosin, 0.4 mg, Oral, Daily      Continuous Infusions:   PRN Meds:.•  acetaminophen **OR** acetaminophen **OR** acetaminophen  •  senna-docusate sodium **AND** polyethylene glycol **AND** bisacodyl **AND** bisacodyl  •  dextrose  •  dextrose  •  Diclofenac Sodium  •  glucagon (human recombinant)  •  HYDROcodone-acetaminophen  •  influenza vaccine  •  ipratropium-albuterol  •  magnesium sulfate **OR** magnesium sulfate in D5W 1g/100mL (PREMIX)  •  nitroglycerin  •  ondansetron **OR** ondansetron  •  sodium  chloride  •  sodium chloride  •  sodium chloride  •  sodium chloride    Assessment & Plan   Assessment & Plan     Active Hospital Problems    Diagnosis  POA   • Elevated troponin [R77.8]  Yes   • Cellulitis of right lower extremity [L03.115]  Yes   • [place] (acute kidney injury) ([facility]) [N17.9]  Yes   • Acute on chronic HFrEF (heart failure with reduced ejection fraction) ([facility]) [I50.23]  Yes   • SSS (sick sinus syndrome) ([facility]) [I49.5]  Yes   • Type 2 diabetes mellitus ([facility]) [E11.9]  Yes   • Hypertension [I10]  Yes   • Hyperlipidemia [E78.5]  Yes   • Hypothyroidism (acquired) [E03.9]  Yes   • S/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 Au [Z95.1]  Not Applicable      Resolved Hospital Problems    Diagnosis Date Resolved POA   • Angina at rest ([facility]) [I20.8] 10/02/2022 Yes     Brief Hospital Course to date:  Jermaine Singh is a 76 y/o male s/p recent toe amputation by Dr. Márquez presented to the ED with acute onset SOA/chest heaviness.    This patient's problems and plans were partially entered by my partner and updated as appropriate by me 10/06/22.     Hypoxia  A/C HFmrEF  Right pleural effusion  Elevated troponin  --Patient requests cardiology consult, follows with Dr. Apodaca, they are following  --Elevated troponin is likely due to CHF in setting of his renal failure. No CP at this time and his troponin trended down  -- D/c heparin per Dr. Apodaca  -- Lexiscan stress test 10/3- reveals anteroseptal scar with no significant ischemia.  LVEF 42%  -- continue Bumex 1mg IV daily  -- pacemaker interrogated  -- resume Eliquis today  -- continue atorvastatin and carvedilol     Urinary retention  -- likely secondary to severe scrotal swelling d/t above  -- monitor with scrotal support  -- patient refused catheter ordered yesterday, will monitor      RLE cellulitis  Recent Left toe amputation  --Previously on bactrim w/out improvement.   -- Received Zyvox, transitioned to doxycycline 10/06  -- Zysyn  -- Dr. Márquez following  --PT  wound care.     [place]  --Suspect mostly due to Bactrim use. Stopped Bactrim  -- stable at 2.1 with ongoing IV diuresis     DM2 w/ hyperglycemia  --Basal bolus insulin w/ SSI.    Depression/Anxiety  - start SSRI zoloft    Expected Discharge Location and Transportation: home with wife  Expected Discharge Date: 10/7/22    DVT prophylaxis:  Medical DVT prophylaxis orders are present.     AM-PAC 6 Clicks Score (PT): 17 (10/06/22 0822)    CODE STATUS:   Code Status and Medical Interventions:   Ordered at: 10/02/22 0949     Code Status (Patient has no pulse and is not breathing):    CPR (Attempt to Resuscitate)     Medical Interventions (Patient has pulse or is breathing):    Full Support       Shabnam Lagos, DAVID  10/06/22

## 2022-10-06 NOTE — PLAN OF CARE
Goal Outcome Evaluation:  Plan of Care Reviewed With: patient        Progress: no change  Outcome Evaluation: VSS. RA. V paced on the monitor. Complaints of headache this AM. PRN tylenol given for pain control. Pt left toe painted with betadine and dressing changed. Blue wipes and orange cream applied to sacrum. Spouse at bedside. Bilateral lower extremity edema. Offloading boot utilized when out of bed. No further complaints at this time.

## 2022-10-06 NOTE — PROGRESS NOTES
Cardiothoracic Surgery Progress Note      POD #: Left great toe amputation August 2, 2022     LOS: 4 days      Subjective: Awake and alert    Objective:  Vital Signs vital signs below noted T-max past 24 hours 97.9 °FTemp:  [96 °F (35.6 °C)-97.9 °F (36.6 °C)] 97.6 °F (36.4 °C)  Heart Rate:  [59-74] 64  Resp:  [18] 18  BP: (111-125)/(73-79) 120/79    Physical Exam:   General Appearance: Oriented x3   Lungs:   Heart:   Skin:   Incision: Amputation site has dry 6 x 6 Optifoam dressing.  No  drainage noted     Results:  Results from last 7 days   Lab Units 10/06/22  0716   WBC 10*3/mm3 4.94   HEMOGLOBIN g/dL 10.8*   HEMATOCRIT % 34.3*   PLATELETS 10*3/mm3 152     Results from last 7 days   Lab Units 10/06/22  0716   SODIUM mmol/L 140   POTASSIUM mmol/L 4.7   CHLORIDE mmol/L 102   CO2 mmol/L 27.0   BUN mg/dL 29*   CREATININE mg/dL 1.86*   GLUCOSE mg/dL 85   CALCIUM mg/dL 9.0         Assessment: #1.  Postop left great toe transmetatarsal amputation August 2, 2022 overall healed well with some minimal distal dehiscence  2.  Hammertoe phenomena left second toe over the middle phalangeal joint space with ulceration and probable joint involvement  #3 ischemic cardiomyopathy with heart failure.  4.  Status post coronary artery stenting    Plan: Overall medical manage per hospitalist.  Cardiology overseeing heart failure medical management.  Nursing service to change left great toe and ulceration of left second toe dressing daily with Betadine swabbing Optifoam dressing.      Chloe Man, DAVID - 10/06/22 - 10:40 EDT

## 2022-10-06 NOTE — CASE MANAGEMENT/SOCIAL WORK
Continued Stay Note  [facility]     Patient Name: Jermaine Singh  MRN: [id number]  Today's Date: 10/6/2022    Admit Date: 10/2/2022    Plan:  follow up   Discharge Plan     Row Name 10/06/22 1305       Plan    Plan  follow up    Plan Comments MSW met with pt. at bedside. Pt. reports that the plan is still for him to return home and resume services with [facility] for (PT/OT/wound care). Pt. reports that he has a BSC, W/C, and walker. Pt. reports he needs something else but wanted MSW to call his spouse Nerissa because he couldn’t remember. MSW called Nerissa and she reports that pt. needs an elevated toilet seat and shower chair. MSW informed pt.’s spouse that those items may not be covered by insurance. No other needs at this time. MSW is available.    Final Discharge Disposition Code 06 - home with home health care               Discharge Codes    No documentation.                     OMAR Downey

## 2022-10-07 LAB
ANION GAP SERPL CALCULATED.3IONS-SCNC: 12 MMOL/L (ref 5–15)
BACTERIA SPEC AEROBE CULT: NORMAL
BACTERIA SPEC AEROBE CULT: NORMAL
BUN SERPL-MCNC: 26 MG/DL (ref 8–23)
BUN/CREAT SERPL: 15.3 (ref 7–25)
CALCIUM SPEC-SCNC: 9 MG/DL (ref 8.6–10.5)
CHLORIDE SERPL-SCNC: 101 MMOL/L (ref 98–107)
CO2 SERPL-SCNC: 23 MMOL/L (ref 22–29)
CREAT SERPL-MCNC: 1.7 MG/DL (ref 0.76–1.27)
EGFRCR SERPLBLD CKD-EPI 2021: 41 ML/MIN/1.73
GLUCOSE BLDC GLUCOMTR-MCNC: 167 MG/DL (ref 70–130)
GLUCOSE BLDC GLUCOMTR-MCNC: 175 MG/DL (ref 70–130)
GLUCOSE BLDC GLUCOMTR-MCNC: 178 MG/DL (ref 70–130)
GLUCOSE BLDC GLUCOMTR-MCNC: 184 MG/DL (ref 70–130)
GLUCOSE SERPL-MCNC: 147 MG/DL (ref 65–99)
POTASSIUM SERPL-SCNC: 4.9 MMOL/L (ref 3.5–5.2)
SODIUM SERPL-SCNC: 136 MMOL/L (ref 136–145)

## 2022-10-07 PROCEDURE — 82962 GLUCOSE BLOOD TEST: CPT

## 2022-10-07 PROCEDURE — 97530 THERAPEUTIC ACTIVITIES: CPT

## 2022-10-07 PROCEDURE — 63710000001 INSULIN DETEMIR PER 5 UNITS: Performed by: INTERNAL MEDICINE

## 2022-10-07 PROCEDURE — 80048 BASIC METABOLIC PNL TOTAL CA: CPT | Performed by: NURSE PRACTITIONER

## 2022-10-07 PROCEDURE — 25010000002 PIPERACILLIN SOD-TAZOBACTAM PER 1 G: Performed by: INTERNAL MEDICINE

## 2022-10-07 PROCEDURE — 63710000001 INSULIN LISPRO (HUMAN) PER 5 UNITS: Performed by: INTERNAL MEDICINE

## 2022-10-07 PROCEDURE — 99233 SBSQ HOSP IP/OBS HIGH 50: CPT | Performed by: INTERNAL MEDICINE

## 2022-10-07 PROCEDURE — 99024 POSTOP FOLLOW-UP VISIT: CPT | Performed by: THORACIC SURGERY (CARDIOTHORACIC VASCULAR SURGERY)

## 2022-10-07 RX ORDER — ASPIRIN 81 MG/1
81 TABLET, CHEWABLE ORAL EVERY OTHER DAY
Status: DISCONTINUED | OUTPATIENT
Start: 2022-10-08 | End: 2022-10-11 | Stop reason: HOSPADM

## 2022-10-07 RX ADMIN — APIXABAN 5 MG: 5 TABLET, FILM COATED ORAL at 09:08

## 2022-10-07 RX ADMIN — Medication 1 CAPSULE: at 09:07

## 2022-10-07 RX ADMIN — LEVOTHYROXINE SODIUM 75 MCG: 0.07 TABLET ORAL at 05:30

## 2022-10-07 RX ADMIN — DOXYCYCLINE 100 MG: 100 CAPSULE ORAL at 20:30

## 2022-10-07 RX ADMIN — DOXYCYCLINE 100 MG: 100 CAPSULE ORAL at 09:08

## 2022-10-07 RX ADMIN — ATORVASTATIN CALCIUM 20 MG: 20 TABLET, FILM COATED ORAL at 20:30

## 2022-10-07 RX ADMIN — ACETAMINOPHEN 650 MG: 325 TABLET ORAL at 01:24

## 2022-10-07 RX ADMIN — Medication 10 ML: at 20:30

## 2022-10-07 RX ADMIN — BETHANECHOL CHLORIDE 10 MG: 10 TABLET ORAL at 20:30

## 2022-10-07 RX ADMIN — INSULIN DETEMIR 10 UNITS: 100 INJECTION, SOLUTION SUBCUTANEOUS at 20:30

## 2022-10-07 RX ADMIN — BUMETANIDE 1 MG: 0.25 INJECTION, SOLUTION INTRAMUSCULAR; INTRAVENOUS at 09:08

## 2022-10-07 RX ADMIN — INSULIN LISPRO 2 UNITS: 100 INJECTION, SOLUTION INTRAVENOUS; SUBCUTANEOUS at 17:03

## 2022-10-07 RX ADMIN — TAZOBACTAM SODIUM AND PIPERACILLIN SODIUM 3.38 G: 375; 3 INJECTION, SOLUTION INTRAVENOUS at 01:24

## 2022-10-07 RX ADMIN — FAMOTIDINE 20 MG: 20 TABLET ORAL at 09:07

## 2022-10-07 RX ADMIN — Medication 10 ML: at 09:12

## 2022-10-07 RX ADMIN — BETHANECHOL CHLORIDE 10 MG: 10 TABLET ORAL at 09:08

## 2022-10-07 RX ADMIN — CARVEDILOL 3.12 MG: 3.12 TABLET, FILM COATED ORAL at 09:08

## 2022-10-07 RX ADMIN — SERTRALINE HYDROCHLORIDE 25 MG: 50 TABLET ORAL at 20:30

## 2022-10-07 RX ADMIN — CARVEDILOL 3.12 MG: 3.12 TABLET, FILM COATED ORAL at 17:02

## 2022-10-07 RX ADMIN — BETHANECHOL CHLORIDE 10 MG: 10 TABLET ORAL at 17:02

## 2022-10-07 RX ADMIN — TAZOBACTAM SODIUM AND PIPERACILLIN SODIUM 3.38 G: 375; 3 INJECTION, SOLUTION INTRAVENOUS at 09:07

## 2022-10-07 RX ADMIN — APIXABAN 5 MG: 5 TABLET, FILM COATED ORAL at 20:30

## 2022-10-07 RX ADMIN — TAMSULOSIN HYDROCHLORIDE 0.4 MG: 0.4 CAPSULE ORAL at 09:07

## 2022-10-07 RX ADMIN — Medication 10 ML: at 20:31

## 2022-10-07 NOTE — PLAN OF CARE
Goal Outcome Evaluation:  Plan of Care Reviewed With: patient, spouse        Progress: improving  Outcome Evaluation: patient was able to ambulate 2x40 ft with one sitting rest between. patient was able to perform LE exercises and educated on breathing and relaxation techniques. patient required min assist to transfer. off loading shoe to left foot fot all mobility.

## 2022-10-07 NOTE — PROGRESS NOTES
"    [facility] Medicine Services  PROGRESS NOTE    Patient Name: Jermaine Singh  : 1945  MRN: [id number]    Date of Admission: 10/2/2022  Primary Care Physician: Farooq Painter MD    Subjective   Subjective     CC:  Shortness of Air    HPI:  Patient frustrated at several things this AM--- he is frustrated at conversation regarding his toe and amputation. D/w Dr Márquez. Patient also is frustrated at lack of PT working with him and requesting they come today. He reports he has been 'in bed too long\" and it getting \"weaker and weaker\". Dr Braga has evaluated patient today, d/w him regarding oral options for discharge hopefully soon. Patient remains on IV diuresis, net neg 3L this admission. Still ongoing scrotal edema. Patient also reports poor sleep but does not wish to take anything as he is very sensitive to all medications for sleep.     ROS:  Gen- No fevers, chills  CV- No chest pain, palpitations  Resp- No cough, dyspnea  GI- No N/V/D, abd pain  Psych- (+) insomnia  +scrotal edema    Objective   Objective     Vital Signs:   Temp:  [97.5 °F (36.4 °C)-98 °F (36.7 °C)] 98 °F (36.7 °C)  Heart Rate:  [55-71] 64  Resp:  [16-18] 18  BP: (108-136)/(55-81) 136/76     Physical Exam:  Constitutional: Awake, alert, NAD  HENT: NCAT, mucous membranes moist  Respiratory: on RA, normal effort  Musculoskeletal: BLE edema, RLE with erythema-warm to touch- cellulitic area appears the same with ongoing erythema. Left toe with dsg CDI form recent amputation in August.   Psychiatric: Appropriate affect, cooperative  Neurologic: Oriented x 3, no neuro focal deficit, speech clear  Skin: No rashes    Results Reviewed:  LAB RESULTS:      Lab 10/06/22  0716 10/05/22  0844 10/04/22  0925 10/03/22  0038 10/02/22  0839 10/02/22  0332   WBC 4.94 5.16 5.65 5.13  --  5.50   HEMOGLOBIN 10.8* 11.0* 10.4* 9.7*  --  10.2*   HEMATOCRIT 34.3* 35.9* 32.9* 30.8*  --  32.4*   PLATELETS 152 154 153 131*  --  142   NEUTROS ABS " 2.94  --   --  3.22  --  3.79   IMMATURE GRANS (ABS) 0.01  --   --  0.01  --  0.01   LYMPHS ABS 1.12  --   --  1.25  --  0.98   MONOS ABS 0.55  --   --  0.44  --  0.57   EOS ABS 0.27  --   --  0.15  --  0.11   MCV 93.0 93.7 93.2 93.6  --  93.4   SED RATE  --   --   --   --   --  6   CRP  --   --   --   --   --  0.40   PROCALCITONIN  --   --   --   --  0.08  --    LACTATE  --   --   --   --   --  1.9   PROTIME  --   --   --  17.9*  --   --    APTT  --   --   --  42.3*  --   --    HEPARIN ANTI-XA  --   --   --  >1.10*  --   --          Lab 10/07/22  0638 10/06/22  0716 10/05/22  0845 10/04/22  0925 10/03/22  1044 10/02/22  0839   SODIUM 136 140 137 141 141  --    POTASSIUM 4.9 4.7 4.8 5.1 5.1  --    CHLORIDE 101 102 99 104 105  --    CO2 23.0 27.0 26.0 26.0 26.0  --    ANION GAP 12.0 11.0 12.0 11.0 10.0  --    BUN 26* 29* 31* 33* 33*  --    CREATININE 1.70* 1.86* 2.04* 2.13* 2.15*  --    EGFR 41.0* 36.8* 32.9* 31.3* 30.9*  --    GLUCOSE 147* 85 117* 133* 110*  --    CALCIUM 9.0 9.0 9.1 9.1 8.8  --    HEMOGLOBIN A1C  --   --   --   --   --  7.20*         Lab 10/06/22  0716 10/05/22  0845 10/04/22  0925 10/02/22  0332   TOTAL PROTEIN 5.6* 6.0 5.8* 6.1   ALBUMIN 3.60 3.80 3.70 3.80   GLOBULIN 2.0 2.2 2.1 2.3   ALT (SGPT) 11 13 13 16   AST (SGOT) 7 9 10 18   BILIRUBIN 0.2 0.3 0.2 0.2   ALK PHOS 68 78 79 85         Lab 10/03/22  1044 10/03/22  0038 10/02/22  2205 10/02/22  2020 10/02/22  1435 10/02/22  0839 10/02/22  0332   PROBNP  --   --   --   --   --   --  25,781.0*   TROPONIN T 0.132*  --  0.151* 0.129* 0.107* 0.129* 0.139*   PROTIME  --  17.9*  --   --   --   --   --    INR  --  1.48*  --   --   --   --   --                  Brief Urine Lab Results  (Last result in the past 365 days)      Color   Clarity   Blood   Leuk Est   Nitrite   Protein   CREAT   Urine HCG        08/23/22 1309             59.2         08/23/22 1309 [place]   Cloudy   Large (3+)   Trace   Negative   30 mg/dL (1+)                 Microbiology  Results Abnormal     Procedure Component Value - Date/Time    Blood Culture - Blood, Arm, Left [[id number]]  (Normal) Collected: 10/02/22 0433    Lab Status: Final result Specimen: Blood from Arm, Left Updated: 10/07/22 0647     Blood Culture No growth at 5 days    Blood Culture - Blood, Arm, Left [[id number]]  (Normal) Collected: 10/02/22 0433    Lab Status: Final result Specimen: Blood from Arm, Left Updated: 10/07/22 0647     Blood Culture No growth at 5 days          No radiology results from the last 24 hrs    Results for orders placed during the hospital encounter of 07/27/22    Adult Transthoracic Echo Complete W/ Cont if Necessary Per Protocol    Interpretation Summary  · Left ventricular ejection fraction appears to be 46 - 50%. Left ventricular systolic function is low normal.  · Left ventricular septal hypokinesis  · No significant valvular heart disease      I have reviewed the medications:  Scheduled Meds:apixaban, 5 mg, Oral, Q12H  aspirin, 81 mg, Oral, Daily  atorvastatin, 20 mg, Oral, Nightly  bethanechol, 10 mg, Oral, TID  bumetanide, 1 mg, Intravenous, Daily  carvedilol, 3.125 mg, Oral, BID With Meals  doxycycline, 100 mg, Oral, Q12H  famotidine, 20 mg, Oral, Daily  insulin detemir, 10 Units, Subcutaneous, Nightly  insulin lispro, 0-9 Units, Subcutaneous, TID AC  lactobacillus acidophilus, 1 capsule, Oral, Daily  levothyroxine, 75 mcg, Oral, Q AM  pharmacy consult - [place], , Does not apply, Daily  piperacillin-tazobactam, 3.375 g, Intravenous, Q8H  senna-docusate sodium, 2 tablet, Oral, BID  sertraline, 25 mg, Oral, Nightly  sodium chloride, 10 mL, Intravenous, Q12H  sodium chloride, 10 mL, Intravenous, Q12H  tamsulosin, 0.4 mg, Oral, Daily      Continuous Infusions:   PRN Meds:.•  acetaminophen **OR** acetaminophen **OR** acetaminophen  •  senna-docusate sodium **AND** polyethylene glycol **AND** bisacodyl **AND** bisacodyl  •  dextrose  •  dextrose  •  Diclofenac Sodium  •  glucagon (human  recombinant)  •  HYDROcodone-acetaminophen  •  influenza vaccine  •  ipratropium-albuterol  •  magnesium sulfate **OR** magnesium sulfate in D5W 1g/100mL (PREMIX)  •  nitroglycerin  •  ondansetron **OR** ondansetron  •  sodium chloride  •  sodium chloride  •  sodium chloride  •  sodium chloride    Assessment & Plan   Assessment & Plan     Active Hospital Problems    Diagnosis  POA   • Elevated troponin [R77.8]  Yes   • Cellulitis of right lower extremity [L03.115]  Yes   • [place] (acute kidney injury) ([facility]) [N17.9]  Yes   • Acute on chronic HFrEF (heart failure with reduced ejection fraction) ([facility]) [I50.23]  Yes   • SSS (sick sinus syndrome) ([facility]) [I49.5]  Yes   • Type 2 diabetes mellitus ([facility]) [E11.9]  Yes   • Hypertension [I10]  Yes   • Hyperlipidemia [E78.5]  Yes   • Hypothyroidism (acquired) [E03.9]  Yes   • S/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 Au [Z95.1]  Not Applicable      Resolved Hospital Problems    Diagnosis Date Resolved POA   • Angina at rest ([facility]) [I20.8] 10/02/2022 Yes     Brief Hospital Course to date:  Jermaine Singh is a 78 y/o male s/p recent toe amputation by Dr. Márquez presented to the ED with acute onset SOA/chest heaviness.    This patient's problems and plans were partially entered by my partner and updated as appropriate by me 10/07/22.     Hypoxia  A/C HFmrEF  Right pleural effusion  Elevated troponin  --Patient requests cardiology consult, follows with Dr. Apodaca, they are following  --Elevated troponin is likely due to CHF in setting of his renal failure. No CP at this time and his troponin trended down  -- D/c heparin per Dr. Apodaca  -- Lexiscan stress test 10/3- reveals anteroseptal scar with no significant ischemia.  LVEF 42%  -- continue Bumex 1mg IV daily-- hopeful for transition to PO option soon per cards   -- pacemaker interrogated this admission  -- resumed Eliquis 10/6  -- continue atorvastatin and carvedilol     Urinary retention  -- likely secondary to severe scrotal swelling  d/t above  -- monitor with scrotal support  -- patient refused catheter ordered yesterday, will monitor      RLE cellulitis  Recent Left toe amputation  --Previously on bactrim w/out improvement.   -- Dr Márquez following as well as now ID, Dr Braga- have d/w both of them today  -- ultimately patient may need amputation of left great toe- have d/w Dr Márquez. For now, patient is able to bear weight using off-loading boot and can work with PT/OT  -- have d/w Dr Braga, hopeful for transition to PO abx therapy soon and ultimately hopeful for discharge soon. Last PT/OT rec from 10/4 was home with HH     [place]  --Suspect mostly due to Bactrim use. Stopped Bactrim  -- improving and 1.7 this AM- continues to improve despite IV diuresis. Watch closely      DM2 w/ hyperglycemia  --Basal bolus insulin w/ SSI.    Depression/Anxiety  - start SSRI zoloft (initiated this admission)    Expected Discharge Location and Transportation: home with wife  Expected Discharge Date: 10/8/22    DVT prophylaxis:  Medical DVT prophylaxis orders are present.     AM-PAC 6 Clicks Score (PT): 17 (10/06/22 1421)    CODE STATUS:   Code Status and Medical Interventions:   Ordered at: 10/02/22 0949     Code Status (Patient has no pulse and is not breathing):    CPR (Attempt to Resuscitate)     Medical Interventions (Patient has pulse or is breathing):    Full Support       Jenna Campbell MD  10/07/22

## 2022-10-07 NOTE — DISCHARGE PLACEMENT REQUEST
"    Please see resumption of care orders for home health     Possible dc over the weekend     Thank you    Maricel Crenshaw [phone]      Jermaine Dawn (77 y.o. Male)             Date of Birth   1945    Social Security Number       Address   [address] [facility] [address]    Home Phone   [phone]    MRN   [id number]       Adventism   None    Marital Status                               Admission Date   10/2/22    Admission Type   Emergency    Admitting Provider   Jenna Campbell MD    Attending Provider   Jenna Campbell MD    Department, Room/Bed   [address], S[address]/1       Discharge Date       Discharge Disposition       Discharge Destination                               Attending Provider: Jenna Campbell MD    Allergies: Vancomycin    Isolation: None   Infection: None   Code Status: CPR   Advance Care Planning Activity    Ht: 190.5 cm (75\")   Wt: 115 kg (253 lb 14.4 oz)    Admission Cmt: None   Principal Problem: None                Active Insurance as of 10/2/2022     Primary Coverage     Payor Plan Insurance Group Employer/Plan Group    MEDICARE MEDICARE A & B      Payor Plan Address Payor Plan Phone Number Payor Plan Fax Number Effective Dates    [address] [phone]  3/1/2010 - None Entered    [facility] [id number]       Subscriber Name Subscriber Birth Date Member ID       JERMAINE DAWN 1945 [id number]           Secondary Coverage     Payor Plan Insurance Group Employer/Plan Group    HUMANA HUMANA  SUP              [id number]     Payor Plan Address Payor Plan Phone Number Payor Plan Fax Number Effective Dates    [address] [address]   8/1/2020 - None Entered    [place] [id number]       Subscriber Name Subscriber Birth Date Member ID       JERMAINE DAWN 1945 [id number]                 Emergency Contacts      (Rel.) Home Phone Work Phone Mobile Phone    Nerissa Dawn (Spouse) -- -- [phone]    Dulce Drew (Daughter) " [phone]            [address]  [address] [facility] [address]  Phone:  [phone]  Fax:  [phone]        Patient:     Jermaine Singh MRN:  [id number]   [address] [place] [address] :  1945  SSN:    Phone: [phone] Sex:  M      INSURANCE PAYOR PLAN GROUP # SUBSCRIBER ID   Primary:  Secondary:    MEDICARE  HUMANA [id number]  [id number]    [id number]  [id number]   Admitting Diagnosis: Angina at rest (HCC) [I20.8]  Order Date:  Oct 7, 2022        Notify Home Health       (Order ID: [id number])     Diagnosis:         Priority:  Routine Expected Date:   Expiration Date:        Interval:  Until Discontinued Count:    Comments: Please resume home health as prior to admission        Specimen Type:   Specimen Source:   Specimen Taken Date:   Specimen Taken Time:                  Authorizing Provider:Jenna Campbell MD  Authorizing Provider's NPI: [id number]  Order Entered By: Maricel Crenshaw RN 10/7/2022  3:26 PM     Electronically signed by: Jenna Campbell MD 10/7/2022  3:26 PM            Physician Progress Notes (last 24 hours)      Cooper Apodaca MD at 10/07/22 1029                                           [facility] Progress Note      LOS: 5 days   Patient Care Team:  Farooq Painter MD as PCP - General (Family Medicine)  Jermaine Hernandez MD as Consulting Physician (Cardiology)    Chief Complaint:    Chief Complaint   Patient presents with   • Shortness of Breath       Subjective     Interval History: Breathing improved    Patient Complaints: Seems more relaxed today.  Spirits are improved--anxious to go home as well as to become more ambulatory especially prior to amputation of the second toe on left foot    Review of Systems:   A 14 point review of systems was negative except as was stated in the HPI      Objective     Vital Sign Min/Max for last 24 hours  Temp  Min: 97.5 °F (36.4  "°C)  Max: 98 °F (36.7 °C)   BP  Min: 108/55  Max: 136/76   Pulse  Min: 55  Max: 71   Resp  Min: 16  Max: 18   SpO2  Min: 91 %  Max: 96 %   No data recorded   Weight  Min: 115 kg (253 lb 14.4 oz)  Max: 115 kg (253 lb 14.4 oz)     Flowsheet Rows    Flowsheet Row First Filed Value   Admission Height 190.5 cm (75\") Documented at 10/02/2022 0306   Admission Weight 103 kg (227 lb) Documented at 10/02/2022 0306          Physical Exam:  General Appearance: Alert, appears stated age and cooperative  Lungs: Clear to auscultation  Heart:: RRR   No Murmurs, Rubs or Gallops  Abdomen: Soft and nontender with adequate bowel sounds.  No organomegaly  Extremities: Left foot bandaged.  Erythematous cellulitic changes right leg  Pulses: Pulses palpable and equal bilaterally  Skin: Warm and dry with no rash  Psych: Normal     Results Review:     I reviewed the patient's new clinical results.  Results from last 7 days   Lab Units 10/07/22  0638 10/06/22  0716 10/05/22  0845   SODIUM mmol/L 136 140 137   POTASSIUM mmol/L 4.9 4.7 4.8   CHLORIDE mmol/L 101 102 99   CO2 mmol/L 23.0 27.0 26.0   BUN mg/dL 26* 29* 31*   CREATININE mg/dL 1.70* 1.86* 2.04*   GLUCOSE mg/dL 147* 85 117*   CALCIUM mg/dL 9.0 9.0 9.1     Results from last 7 days   Lab Units 10/06/22  0716 10/05/22  0844 10/04/22  0925   WBC 10*3/mm3 4.94 5.16 5.65   HEMOGLOBIN g/dL 10.8* 11.0* 10.4*   HEMATOCRIT % 34.3* 35.9* 32.9*   PLATELETS 10*3/mm3 152 154 153     Lab Results   Lab Value Date/Time    TROPONINT 0.132 (C) 10/03/2022 1044    TROPONINT 0.151 (C) 10/02/2022 2205    TROPONINT 0.129 (C) 10/02/2022 2020    TROPONINT 0.107 (C) 10/02/2022 1435    TROPONINT 0.129 (C) 10/02/2022 0839    TROPONINT 0.139 (C) 10/02/2022 0332    TROPONINT 0.230 (C) 08/13/2022 2310    TROPONINT 0.229 (C) 08/13/2022 1224    TROPONINT 0.230 (C) 08/13/2022 0730    TROPONINT 0.206 (C) 08/12/2022 1653    TROPONINT 0.157 (C) 08/12/2022 1025    TROPONINT 0.146 (C) 08/11/2022 0636    TROPONINT 0.129 (C) " 08/10/2022 2239    TROPONINT 0.108 (C) 08/10/2022 1638    TROPONINT 0.013 10/20/2021 1705    TROPONINT <0.010 10/20/2021 1508         Results from last 7 days   Lab Units 10/03/22  0038   INR  1.48*               Medication Review: yes  Current Facility-Administered Medications   Medication Dose Route Frequency Provider Last Rate Last Admin   • acetaminophen (TYLENOL) tablet 650 mg  650 mg Oral Q4H PRN Renetta Gatica M II, DO   650 mg at 10/07/22 0124    Or   • acetaminophen (TYLENOL) 160 MG/5ML solution 650 mg  650 mg Oral Q4H PRN En, Renetta M II, DO        Or   • acetaminophen (TYLENOL) suppository 650 mg  650 mg Rectal Q4H PRN Renetta Gatica M II, DO       • apixaban (ELIQUIS) tablet 5 mg  5 mg Oral Q12H Jacinta Daniel, DO   5 mg at 10/07/22 0908   • aspirin chewable tablet 81 mg  81 mg Oral Daily Renetta Gatica M II, DO   81 mg at 10/06/22 0833   • atorvastatin (LIPITOR) tablet 20 mg  20 mg Oral Nightly Sarah Pineda APRN   20 mg at 10/06/22 2033   • bethanechol (URECHOLINE) tablet 10 mg  10 mg Oral TID Renetta Gatica M II, DO   10 mg at 10/07/22 0908   • sennosides-docusate (PERICOLACE) 8.6-50 MG per tablet 2 tablet  2 tablet Oral BID Renetta Gatica M II, DO   2 tablet at 10/06/22 0833    And   • polyethylene glycol (MIRALAX) packet 17 g  17 g Oral Daily PRN En, Renetta M II, DO        And   • bisacodyl (DULCOLAX) EC tablet 5 mg  5 mg Oral Daily PRN En, Renetta M II, DO        And   • bisacodyl (DULCOLAX) suppository 10 mg  10 mg Rectal Daily PRN EnRenetta M II, DO       • bumetanide (BUMEX) injection 1 mg  1 mg Intravenous Daily Jacinta Daniel, DO   1 mg at 10/07/22 0908   • carvedilol (COREG) tablet 3.125 mg  3.125 mg Oral BID With Meals Renetta Gatica II, DO   3.125 mg at 10/07/22 0908   • dextrose (D50W) (25 g/50 mL) IV injection 25 g  25 g Intravenous Q15 Min PRN Renetta Gatica II, DO       • dextrose (GLUTOSE) oral gel 15 g  15 g Oral Q15 Min PRN Renetta Gatica II, DO       •  Diclofenac Sodium (VOLTAREN) 1 % gel 2 g  2 g Topical 4x Daily PRN Renetta Gatica II, DO       • doxycycline (MONODOX) capsule 100 mg  100 mg Oral Q12H Jenna Campbell MD   100 mg at 10/07/22 0908   • famotidine (PEPCID) tablet 20 mg  20 mg Oral Daily EnRenetta russo II, DO   20 mg at 10/07/22 0907   • glucagon (human recombinant) (GLUCAGEN DIAGNOSTIC) injection 1 mg  1 mg Intramuscular Q15 Min PRN Renetta Gatica II, DO       • HYDROcodone-acetaminophen (NORCO) 5-325 MG per tablet 1 tablet  1 tablet Oral Q4H PRN Renetta Gatica II, DO   1 tablet at 10/04/22 0137   • Influenza Vac High-Dose Quad (FLUZONE HIGH DOSE) injection 0.7 mL  0.7 mL Intramuscular During Hospitalization Renetta Gatica II, DO       • insulin detemir (LEVEMIR) injection 10 Units  10 Units Subcutaneous Nightly EnRenetta russo II, DO   10 Units at 10/05/22 2018   • Insulin Lispro (humaLOG) injection 0-9 Units  0-9 Units Subcutaneous TID AC Renetta Gatica II, DO       • ipratropium-albuterol (DUO-NEB) nebulizer solution 3 mL  3 mL Nebulization Q4H PRN Renetta Gatica II, DO       • lactobacillus acidophilus (RISAQUAD) capsule 1 capsule  1 capsule Oral Daily EnRenetta russo II, DO   1 capsule at 10/07/22 0907   • levothyroxine (SYNTHROID, LEVOTHROID) tablet 75 mcg  75 mcg Oral Q AM Renetta Gatica II, DO   75 mcg at 10/07/22 0530   • Magnesium Sulfate 2 gram infusion - Mg less than or equal to 1.5 mg/dL  2 g Intravenous PRN Sarah Pineda APRN        Or   • Magnesium Sulfate 1 gram infusion - Mg 1.6-1.9 mg/dL  1 g Intravenous PRN Sarah Pineda, APRN       • nitroglycerin (NITROSTAT) SL tablet 0.4 mg  0.4 mg Sublingual Q5 Min PRN Sarah Pineda APRN       • ondansetron (ZOFRAN) tablet 4 mg  4 mg Oral Q6H PRN Renetta Gatica II, DO        Or   • ondansetron (ZOFRAN) injection 4 mg  4 mg Intravenous Q6H PRN Renetta Gatica II, DO   4 mg at 10/02/22 1509   • Pharmacy Consult - MTM   Does not apply Daily Mya Han, PharmD        • piperacillin-tazobactam (ZOSYN) 3.375 g in iso-osmotic dextrose 50 ml (premix)  3.375 g Intravenous Q8H Renetta Gatica II, DO   3.375 g at 10/07/22 0907   • sertraline (ZOLOFT) tablet 25 mg  25 mg Oral Nightly Jenna Campbell MD   25 mg at 10/06/22 2033   • sodium chloride 0.9 % flush 10 mL  10 mL Intravenous PRN Sarah Pineda, APRN       • sodium chloride 0.9 % flush 10 mL  10 mL Intravenous Q12H VibSarah waldrop, APRN   10 mL at 10/06/22 2034   • sodium chloride 0.9 % flush 10 mL  10 mL Intravenous PRN Sarah Pineda, APRN       • sodium chloride 0.9 % flush 10 mL  10 mL Intravenous Q12H Renetta Gatica II, DO   10 mL at 10/07/22 0912   • sodium chloride 0.9 % flush 10 mL  10 mL Intravenous PRN Renetta Gatica II, DO       • sodium chloride nasal spray 1 spray  1 spray Nasal PRN Renetta Gatica II, DO       • tamsulosin (FLOMAX) 24 hr capsule 0.4 mg  0.4 mg Oral Daily Renetta Gatica II, DO   0.4 mg at 10/07/22 0907     Facility-Administered Medications Ordered in Other Encounters   Medication Dose Route Frequency Provider Last Rate Last Admin   • Chlorhexidine Gluconate Cloth 2 % pads 1 application  1 application Topical Q12H PRN Mohan Arauz, PA             Type 2 diabetes mellitus (HCC)    Hypertension    Hyperlipidemia    Hypothyroidism (acquired)    S/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 Au    SSS (sick sinus syndrome) ([facility])    [place] (acute kidney injury) ([facility])    Acute on chronic HFrEF (heart failure with reduced ejection fraction) ([facility])    Elevated troponin    Cellulitis of right lower extremity        Impression      Systolic heart failure with subacute decompensation--improved  Coronary artery disease with previous CABG February 2019  Nonspecific troponin evaluation with no classic rise and fall  Sick sinus syndrome [facility] pacemaker  Peripheral vascular disease status post amputation left great toe August 2022  History of bilateral lower extremity DVT August 2022  Chronic kidney  "disease--stable  Hypertension    Lexiscan reveals anteroseptal scar with no significant ischemia.  LVEF 42%    Plan     Continue present medications.  Will defer ACE ARB and Entresto for the time being secondary to kidney disease  Care discussed with Dr. Mráquez.  Although he presented with some decompensation of heart failure.  He is currently stable and his nuclear scan this admission was acceptable.  Therefore I think he is a reasonable risk for general anesthesia for amputation of the second toe on the left foot if needed.  I discussed the timing of this with Dr. Márquez who thinks it could be done electively when the patient is more mentally and physically up to the cast.  In the interim, hopefully he can be transitioned to oral antibiotics so that he could continue his therapy as an outpatient      Cooper Apodaca MD   10/07/22  10:29 EDT          Electronically signed by Cooper Apodaca MD at 10/07/22 1031     Jenna Campbell MD at 10/07/22 1017              [facility] Medicine Services  PROGRESS NOTE    Patient Name: Jermaine Singh  : 1945  MRN: [id number]    Date of Admission: 10/2/2022  Primary Care Physician: Farooq Painter MD    Subjective   Subjective     CC:  Shortness of Air    HPI:  Patient frustrated at several things this AM--- he is frustrated at conversation regarding his toe and amputation. D/w Dr Márquez. Patient also is frustrated at lack of PT working with him and requesting they come today. He reports he has been 'in bed too long\" and it getting \"weaker and weaker\". Dr Braga has evaluated patient today, d/w him regarding oral options for discharge hopefully soon. Patient remains on IV diuresis, net neg 3L this admission. Still ongoing scrotal edema. Patient also reports poor sleep but does not wish to take anything as he is very sensitive to all medications for sleep.     ROS:  Gen- No fevers, chills  CV- No chest pain, palpitations  Resp- No cough, " dyspnea  GI- No N/V/D, abd pain  Psych- (+) insomnia  +scrotal edema    Objective   Objective     Vital Signs:   Temp:  [97.5 °F (36.4 °C)-98 °F (36.7 °C)] 98 °F (36.7 °C)  Heart Rate:  [55-71] 64  Resp:  [16-18] 18  BP: (108-136)/(55-81) 136/76     Physical Exam:  Constitutional: Awake, alert, NAD  HENT: NCAT, mucous membranes moist  Respiratory: on RA, normal effort  Musculoskeletal: BLE edema, RLE with erythema-warm to touch- cellulitic area appears the same with ongoing erythema. Left toe with dsg CDI form recent amputation in August.   Psychiatric: Appropriate affect, cooperative  Neurologic: Oriented x 3, no neuro focal deficit, speech clear  Skin: No rashes    Results Reviewed:  LAB RESULTS:      Lab 10/06/22  0716 10/05/22  0844 10/04/22  0925 10/03/22  0038 10/02/22  0839 10/02/22  0332   WBC 4.94 5.16 5.65 5.13  --  5.50   HEMOGLOBIN 10.8* 11.0* 10.4* 9.7*  --  10.2*   HEMATOCRIT 34.3* 35.9* 32.9* 30.8*  --  32.4*   PLATELETS 152 154 153 131*  --  142   NEUTROS ABS 2.94  --   --  3.22  --  3.79   IMMATURE GRANS (ABS) 0.01  --   --  0.01  --  0.01   LYMPHS ABS 1.12  --   --  1.25  --  0.98   MONOS ABS 0.55  --   --  0.44  --  0.57   EOS ABS 0.27  --   --  0.15  --  0.11   MCV 93.0 93.7 93.2 93.6  --  93.4   SED RATE  --   --   --   --   --  6   CRP  --   --   --   --   --  0.40   PROCALCITONIN  --   --   --   --  0.08  --    LACTATE  --   --   --   --   --  1.9   PROTIME  --   --   --  17.9*  --   --    APTT  --   --   --  42.3*  --   --    HEPARIN ANTI-XA  --   --   --  >1.10*  --   --          Lab 10/07/22  0638 10/06/22  0716 10/05/22  0845 10/04/22  0925 10/03/22  1044 10/02/22  0839   SODIUM 136 140 137 141 141  --    POTASSIUM 4.9 4.7 4.8 5.1 5.1  --    CHLORIDE 101 102 99 104 105  --    CO2 23.0 27.0 26.0 26.0 26.0  --    ANION GAP 12.0 11.0 12.0 11.0 10.0  --    BUN 26* 29* 31* 33* 33*  --    CREATININE 1.70* 1.86* 2.04* 2.13* 2.15*  --    EGFR 41.0* 36.8* 32.9* 31.3* 30.9*  --    GLUCOSE 147* 85  117* 133* 110*  --    CALCIUM 9.0 9.0 9.1 9.1 8.8  --    HEMOGLOBIN A1C  --   --   --   --   --  7.20*         Lab 10/06/22  0716 10/05/22  0845 10/04/22  0925 10/02/22  0332   TOTAL PROTEIN 5.6* 6.0 5.8* 6.1   ALBUMIN 3.60 3.80 3.70 3.80   GLOBULIN 2.0 2.2 2.1 2.3   ALT (SGPT) 11 13 13 16   AST (SGOT) 7 9 10 18   BILIRUBIN 0.2 0.3 0.2 0.2   ALK PHOS 68 78 79 85         Lab 10/03/22  1044 10/03/22  0038 10/02/22  2205 10/02/22  2020 10/02/22  1435 10/02/22  0839 10/02/22  0332   PROBNP  --   --   --   --   --   --  25,781.0*   TROPONIN T 0.132*  --  0.151* 0.129* 0.107* 0.129* 0.139*   PROTIME  --  17.9*  --   --   --   --   --    INR  --  1.48*  --   --   --   --   --                  Brief Urine Lab Results  (Last result in the past 365 days)      Color   Clarity   Blood   Leuk Est   Nitrite   Protein   CREAT   Urine HCG        08/23/22 1309             59.2         08/23/22 1309 [place]   Cloudy   Large (3+)   Trace   Negative   30 mg/dL (1+)                 Microbiology Results Abnormal     Procedure Component Value - Date/Time    Blood Culture - Blood, Arm, Left [[id number]]  (Normal) Collected: 10/02/22 0433    Lab Status: Final result Specimen: Blood from Arm, Left Updated: 10/07/22 0647     Blood Culture No growth at 5 days    Blood Culture - Blood, Arm, Left [[id number]]  (Normal) Collected: 10/02/22 0433    Lab Status: Final result Specimen: Blood from Arm, Left Updated: 10/07/22 0647     Blood Culture No growth at 5 days          No radiology results from the last 24 hrs    Results for orders placed during the hospital encounter of 07/27/22    Adult Transthoracic Echo Complete W/ Cont if Necessary Per Protocol    Interpretation Summary  · Left ventricular ejection fraction appears to be 46 - 50%. Left ventricular systolic function is low normal.  · Left ventricular septal hypokinesis  · No significant valvular heart disease      I have reviewed the medications:  Scheduled Meds:apixaban, 5 mg, Oral,  Q12H  aspirin, 81 mg, Oral, Daily  atorvastatin, 20 mg, Oral, Nightly  bethanechol, 10 mg, Oral, TID  bumetanide, 1 mg, Intravenous, Daily  carvedilol, 3.125 mg, Oral, BID With Meals  doxycycline, 100 mg, Oral, Q12H  famotidine, 20 mg, Oral, Daily  insulin detemir, 10 Units, Subcutaneous, Nightly  insulin lispro, 0-9 Units, Subcutaneous, TID AC  lactobacillus acidophilus, 1 capsule, Oral, Daily  levothyroxine, 75 mcg, Oral, Q AM  pharmacy consult - MTM, , Does not apply, Daily  piperacillin-tazobactam, 3.375 g, Intravenous, Q8H  senna-docusate sodium, 2 tablet, Oral, BID  sertraline, 25 mg, Oral, Nightly  sodium chloride, 10 mL, Intravenous, Q12H  sodium chloride, 10 mL, Intravenous, Q12H  tamsulosin, 0.4 mg, Oral, Daily      Continuous Infusions:   PRN Meds:.•  acetaminophen **OR** acetaminophen **OR** acetaminophen  •  senna-docusate sodium **AND** polyethylene glycol **AND** bisacodyl **AND** bisacodyl  •  dextrose  •  dextrose  •  Diclofenac Sodium  •  glucagon (human recombinant)  •  HYDROcodone-acetaminophen  •  influenza vaccine  •  ipratropium-albuterol  •  magnesium sulfate **OR** magnesium sulfate in D5W 1g/100mL (PREMIX)  •  nitroglycerin  •  ondansetron **OR** ondansetron  •  sodium chloride  •  sodium chloride  •  sodium chloride  •  sodium chloride    Assessment & Plan   Assessment & Plan     Active Hospital Problems    Diagnosis  POA   • Elevated troponin [R77.8]  Yes   • Cellulitis of right lower extremity [L03.115]  Yes   • [place] (acute kidney injury) ([facility]) [N17.9]  Yes   • Acute on chronic HFrEF (heart failure with reduced ejection fraction) ([facility]) [I50.23]  Yes   • SSS (sick sinus syndrome) ([facility]) [I49.5]  Yes   • Type 2 diabetes mellitus (HCC) [E11.9]  Yes   • Hypertension [I10]  Yes   • Hyperlipidemia [E78.5]  Yes   • Hypothyroidism (acquired) [E03.9]  Yes   • S/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 Au [Z95.1]  Not Applicable      Resolved Hospital Problems    Diagnosis Date Resolved POA   • Angina at  rest ([facility]) [I20.8] 10/02/2022 Yes     Brief Hospital Course to date:  Jermaine Singh is a 76 y/o male s/p recent toe amputation by Dr. Márquez presented to the ED with acute onset SOA/chest heaviness.    This patient's problems and plans were partially entered by my partner and updated as appropriate by me 10/07/22.     Hypoxia  A/C HFmrEF  Right pleural effusion  Elevated troponin  --Patient requests cardiology consult, follows with Dr. Apodaca, they are following  --Elevated troponin is likely due to CHF in setting of his renal failure. No CP at this time and his troponin trended down  -- D/c heparin per Dr. Apodaca  -- Lexiscan stress test 10/3- reveals anteroseptal scar with no significant ischemia.  LVEF 42%  -- continue Bumex 1mg IV daily-- hopeful for transition to PO option soon per cards   -- pacemaker interrogated this admission  -- resumed Eliquis 10/6  -- continue atorvastatin and carvedilol     Urinary retention  -- likely secondary to severe scrotal swelling d/t above  -- monitor with scrotal support  -- patient refused catheter ordered yesterday, will monitor      RLE cellulitis  Recent Left toe amputation  --Previously on bactrim w/out improvement.   -- Dr Márquez following as well as now ID, Dr Braga- have d/w both of them today  -- ultimately patient may need amputation of left great toe- have d/w Dr Márquez. For now, patient is able to bear weight using off-loading boot and can work with PT/OT  -- have d/w Dr Braga, hopeful for transition to PO abx therapy soon and ultimately hopeful for discharge soon. Last PT/OT rec from 10/4 was home with HH     [place]  --Suspect mostly due to Bactrim use. Stopped Bactrim  -- improving and 1.7 this AM- continues to improve despite IV diuresis. Watch closely      DM2 w/ hyperglycemia  --Basal bolus insulin w/ SSI.    Depression/Anxiety  - start SSRI zoloft (initiated this admission)    Expected Discharge Location and Transportation: home with wife  Expected Discharge  Date: 10/8/22    DVT prophylaxis:  Medical DVT prophylaxis orders are present.     AM-PAC 6 Clicks Score (PT): 17 (10/06/22 0866)    CODE STATUS:   Code Status and Medical Interventions:   Ordered at: 10/02/22 0967     Code Status (Patient has no pulse and is not breathing):    CPR (Attempt to Resuscitate)     Medical Interventions (Patient has pulse or is breathing):    Full Support       Jenna Campbell MD  10/07/22                Electronically signed by Jenna Campbell MD at 10/07/22 1023     Gopal Márquez MD at 10/07/22 0600          Cardiothoracic Surgery Progress Note      POD #: Left great toe amputation August 2, 2022     LOS: 5 days      Subjective: Awake alert    Objective:  Vital Signs vital signs below noted T-max past 24 hours 97.9 °F  Temp:  [97.5 °F (36.4 °C)-97.9 °F (36.6 °C)] 97.6 °F (36.4 °C)  Heart Rate:  [55-74] 64  Resp:  [16-18] 18  BP: (108-129)/(55-81) 123/74    Physical Exam:   General Appearance: Oriented x3   Lungs:   Heart:   Skin: Middle phalangeal joint of the left second toe exposed   Incision: Amputation site dressing change.  Overall incision is intact with some dehiscence at the most distal portion.     Results:  Results from last 7 days   Lab Units 10/06/22  0716   WBC 10*3/mm3 4.94   HEMOGLOBIN g/dL 10.8*   HEMATOCRIT % 34.3*   PLATELETS 10*3/mm3 152     Results from last 7 days   Lab Units 10/06/22  0716   SODIUM mmol/L 140   POTASSIUM mmol/L 4.7   CHLORIDE mmol/L 102   CO2 mmol/L 27.0   BUN mg/dL 29*   CREATININE mg/dL 1.86*   GLUCOSE mg/dL 85   CALCIUM mg/dL 9.0         Assessment: #1.  Postop left great toe transmetatarsal amputation August 2, 2022 with some minimal distal dehiscence  2.  Exposed middle phalangeal bone of the left second toe from hammertoe phenomena  3.  Ischemic cardiomyopathy and heart failure  4.  Status post remote coronary artery stenting      Plan: Overall medical manage per hospitalist.  Heart failure management per Dr. Apodaca of cardiology.   Ideally this patient needs a left second toe amputation we will be discussing that with Dr. Apodaca regarding the risk involved and also will discuss possible antibiotic coverage while hospitalized with infectious disease/Dr. Richardson GREEN. MD Willi - 10/07/22 - 06:00 EDT        Electronically signed by Gopal Márquez MD at 10/07/22 0603

## 2022-10-07 NOTE — PROGRESS NOTES
"                                 [facility] Progress Note      LOS: 5 days   Patient Care Team:  Farooq Painter MD as PCP - General (Family Medicine)  Jermaine Hernandez MD as Consulting Physician (Cardiology)    Chief Complaint:    Chief Complaint   Patient presents with   • Shortness of Breath       Subjective     Interval History: Breathing improved    Patient Complaints: Seems more relaxed today.  Spirits are improved--anxious to go home as well as to become more ambulatory especially prior to amputation of the second toe on left foot    Review of Systems:   A 14 point review of systems was negative except as was stated in the HPI      Objective     Vital Sign Min/Max for last 24 hours  Temp  Min: 97.5 °F (36.4 °C)  Max: 98 °F (36.7 °C)   BP  Min: 108/55  Max: 136/76   Pulse  Min: 55  Max: 71   Resp  Min: 16  Max: 18   SpO2  Min: 91 %  Max: 96 %   No data recorded   Weight  Min: 115 kg (253 lb 14.4 oz)  Max: 115 kg (253 lb 14.4 oz)     Flowsheet Rows    Flowsheet Row First Filed Value   Admission Height 190.5 cm (75\") Documented at 10/02/2022 0306   Admission Weight 103 kg (227 lb) Documented at 10/02/2022 0306          Physical Exam:  General Appearance: Alert, appears stated age and cooperative  Lungs: Clear to auscultation  Heart:: RRR   No Murmurs, Rubs or Gallops  Abdomen: Soft and nontender with adequate bowel sounds.  No organomegaly  Extremities: Left foot bandaged.  Erythematous cellulitic changes right leg  Pulses: Pulses palpable and equal bilaterally  Skin: Warm and dry with no rash  Psych: Normal     Results Review:     I reviewed the patient's new clinical results.  Results from last 7 days   Lab Units 10/07/22  0638 10/06/22  0716 10/05/22  0845   SODIUM mmol/L 136 140 137   POTASSIUM mmol/L 4.9 4.7 4.8   CHLORIDE mmol/L 101 102 99   CO2 mmol/L 23.0 27.0 26.0   BUN mg/dL 26* 29* 31*   CREATININE mg/dL 1.70* 1.86* 2.04*   GLUCOSE mg/dL 147* 85 117*   CALCIUM mg/dL 9.0 9.0 9.1 "     Results from last 7 days   Lab Units 10/06/22  0716 10/05/22  0844 10/04/22  0925   WBC 10*3/mm3 4.94 5.16 5.65   HEMOGLOBIN g/dL 10.8* 11.0* 10.4*   HEMATOCRIT % 34.3* 35.9* 32.9*   PLATELETS 10*3/mm3 152 154 153     Lab Results   Lab Value Date/Time    TROPONINT 0.132 (C) 10/03/2022 1044    TROPONINT 0.151 (C) 10/02/2022 2205    TROPONINT 0.129 (C) 10/02/2022 2020    TROPONINT 0.107 (C) 10/02/2022 1435    TROPONINT 0.129 (C) 10/02/2022 0839    TROPONINT 0.139 (C) 10/02/2022 0332    TROPONINT 0.230 (C) 08/13/2022 2310    TROPONINT 0.229 (C) 08/13/2022 1224    TROPONINT 0.230 (C) 08/13/2022 0730    TROPONINT 0.206 (C) 08/12/2022 1653    TROPONINT 0.157 (C) 08/12/2022 1025    TROPONINT 0.146 (C) 08/11/2022 0636    TROPONINT 0.129 (C) 08/10/2022 2239    TROPONINT 0.108 (C) 08/10/2022 1638    TROPONINT 0.013 10/20/2021 1705    TROPONINT <0.010 10/20/2021 1508         Results from last 7 days   Lab Units 10/03/22  0038   INR  1.48*               Medication Review: yes  Current Facility-Administered Medications   Medication Dose Route Frequency Provider Last Rate Last Admin   • acetaminophen (TYLENOL) tablet 650 mg  650 mg Oral Q4H PRN En, Renetta M II, DO   650 mg at 10/07/22 0124    Or   • acetaminophen (TYLENOL) 160 MG/5ML solution 650 mg  650 mg Oral Q4H PRN EnRenetta M II, DO        Or   • acetaminophen (TYLENOL) suppository 650 mg  650 mg Rectal Q4H PRN En, Renetta M II, DO       • apixaban (ELIQUIS) tablet 5 mg  5 mg Oral Q12H Jacinta Daniel, DO   5 mg at 10/07/22 0908   • aspirin chewable tablet 81 mg  81 mg Oral Daily Renetta Gatica II, DO   81 mg at 10/06/22 0833   • atorvastatin (LIPITOR) tablet 20 mg  20 mg Oral Nightly Sarah Pineda APRN   20 mg at 10/06/22 2033   • bethanechol (URECHOLINE) tablet 10 mg  10 mg Oral TID Renetta Gatica II, DO   10 mg at 10/07/22 0908   • sennosides-docusate (PERICOLACE) 8.6-50 MG per tablet 2 tablet  2 tablet Oral BID Renetta Gatica II, DO   2 tablet at  10/06/22 0833    And   • polyethylene glycol (MIRALAX) packet 17 g  17 g Oral Daily PRN Renetta Gatica II, DO        And   • bisacodyl (DULCOLAX) EC tablet 5 mg  5 mg Oral Daily PRN EnRenetta II, DO        And   • bisacodyl (DULCOLAX) suppository 10 mg  10 mg Rectal Daily PRN Renetta Gatica II, DO       • bumetanide (BUMEX) injection 1 mg  1 mg Intravenous Daily Jacinta Daniel DO   1 mg at 10/07/22 0908   • carvedilol (COREG) tablet 3.125 mg  3.125 mg Oral BID With Meals EnRenetta M II, DO   3.125 mg at 10/07/22 0908   • dextrose (D50W) (25 g/50 mL) IV injection 25 g  25 g Intravenous Q15 Min PRN Renetta Gatica M II, DO       • dextrose (GLUTOSE) oral gel 15 g  15 g Oral Q15 Min PRN Renetta Gatica II, DO       • Diclofenac Sodium (VOLTAREN) 1 % gel 2 g  2 g Topical 4x Daily PRN Renetta Gatica II, DO       • doxycycline (MONODOX) capsule 100 mg  100 mg Oral Q12H Jenna Campbell MD   100 mg at 10/07/22 0908   • famotidine (PEPCID) tablet 20 mg  20 mg Oral Daily EnRenetta M II, DO   20 mg at 10/07/22 0907   • glucagon (human recombinant) (GLUCAGEN DIAGNOSTIC) injection 1 mg  1 mg Intramuscular Q15 Min PRN Renetta Gatica II, DO       • HYDROcodone-acetaminophen (NORCO) 5-325 MG per tablet 1 tablet  1 tablet Oral Q4H PRN Renetta Gatica II, DO   1 tablet at 10/04/22 0137   • Influenza Vac High-Dose Quad (FLUZONE HIGH DOSE) injection 0.7 mL  0.7 mL Intramuscular During Hospitalization EnRenetta II, DO       • insulin detemir (LEVEMIR) injection 10 Units  10 Units Subcutaneous Nightly EnRenetta russo II, DO   10 Units at 10/05/22 2018   • Insulin Lispro (humaLOG) injection 0-9 Units  0-9 Units Subcutaneous TID AC Renetta Gatica II DO       • ipratropium-albuterol (DUO-NEB) nebulizer solution 3 mL  3 mL Nebulization Q4H PRN Renetta Gatica II, DO       • lactobacillus acidophilus (RISAQUAD) capsule 1 capsule  1 capsule Oral Daily Renetta Gatica II, DO   1 capsule at 10/07/22 0907    • levothyroxine (SYNTHROID, LEVOTHROID) tablet 75 mcg  75 mcg Oral Q AM EnRenetta II, DO   75 mcg at 10/07/22 0530   • Magnesium Sulfate 2 gram infusion - Mg less than or equal to 1.5 mg/dL  2 g Intravenous PRN Sarah Pineda, APRN        Or   • Magnesium Sulfate 1 gram infusion - Mg 1.6-1.9 mg/dL  1 g Intravenous PRN Sarah Pineda, APRN       • nitroglycerin (NITROSTAT) SL tablet 0.4 mg  0.4 mg Sublingual Q5 Min PRN Sarah Pineda, APRN       • ondansetron (ZOFRAN) tablet 4 mg  4 mg Oral Q6H PRN EnRenetta II, DO        Or   • ondansetron (ZOFRAN) injection 4 mg  4 mg Intravenous Q6H PRN EnRenetta II, DO   4 mg at 10/02/22 1509   • Pharmacy Consult - MTM   Does not apply Daily Mya Han, PharmD       • piperacillin-tazobactam (ZOSYN) 3.375 g in iso-osmotic dextrose 50 ml (premix)  3.375 g Intravenous Q8H EnRenetta II, DO   3.375 g at 10/07/22 0907   • sertraline (ZOLOFT) tablet 25 mg  25 mg Oral Nightly Jenna Campbell MD   25 mg at 10/06/22 2033   • sodium chloride 0.9 % flush 10 mL  10 mL Intravenous PRN Sarah Pineda, APRN       • sodium chloride 0.9 % flush 10 mL  10 mL Intravenous Q12H Sarah Pineda APRN   10 mL at 10/06/22 2034   • sodium chloride 0.9 % flush 10 mL  10 mL Intravenous PRN Sarah Pineda, APRN       • sodium chloride 0.9 % flush 10 mL  10 mL Intravenous Q12H EnRenetta II, DO   10 mL at 10/07/22 0912   • sodium chloride 0.9 % flush 10 mL  10 mL Intravenous PRN EnRenetta II, DO       • sodium chloride nasal spray 1 spray  1 spray Nasal PRN Renetta Gatica II, DO       • tamsulosin (FLOMAX) 24 hr capsule 0.4 mg  0.4 mg Oral Daily Renetta Gatica II, DO   0.4 mg at 10/07/22 0907     Facility-Administered Medications Ordered in Other Encounters   Medication Dose Route Frequency Provider Last Rate Last Admin   • Chlorhexidine Gluconate Cloth 2 % pads 1 application  1 application Topical Q12H PRN Mohan Arauz, PA             Type 2  diabetes mellitus (HCC)    Hypertension    Hyperlipidemia    Hypothyroidism (acquired)    S/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 Au    SSS (sick sinus syndrome) (HCC)    [place] (acute kidney injury) (HCC)    Acute on chronic HFrEF (heart failure with reduced ejection fraction) (HCC)    Elevated troponin    Cellulitis of right lower extremity        Impression      Systolic heart failure with subacute decompensation--improved  Coronary artery disease with previous CABG February 2019  Nonspecific troponin evaluation with no classic rise and fall  Sick sinus syndrome [employer] pacemaker  Peripheral vascular disease status post amputation left great toe August 2022  History of bilateral lower extremity DVT August 2022  Chronic kidney disease--stable  Hypertension    Lexiscan reveals anteroseptal scar with no significant ischemia.  LVEF 42%    Plan     Continue present medications.  Will defer ACE ARB and Entresto for the time being secondary to kidney disease  Care discussed with Dr. Márquez.  Although he presented with some decompensation of heart failure, he is currently stable and his nuclear scan this admission was acceptable.  Therefore I think he is a reasonable risk for general anesthesia for amputation of the second toe on the left foot if needed.  I discussed the timing of this with Dr. Márquez who thinks it could be done electively when the patient is more mentally and physically up to the task..  In the interim, hopefully he can be transitioned to oral antibiotics so that he could continue his therapy as an outpatient      Cooper Apodaca MD   10/07/22  10:29 EDT

## 2022-10-07 NOTE — CONSULTS
INFECTIOUS DISEASE CONSULT/INITIAL HOSPITAL VISIT    Jermaine Singh  1945  [id number]    Date of Consult: 10/7/2022    Admission Date: 10/2/2022      Requesting Provider: No ref. provider found  Evaluating Physician: Sage Braga MD    Reason for Consultation: Shortness of breath    History of present illness:    Patient is a 77 y.o. 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  Course complicated by acute renal failure.  Patient presents to hospital with shortness of breath and a sensation of smothering.  Patient also reports increasing redness of right lower extremity.  Patient recently treated with oral Bactrim.  Patient started on IV Rocephin we being consulted for further antibiotic management.    Patient denies fevers chills chest pain nausea vomiting or diarrhea    Patient admitted hospital on 10/2/2022, started on iv abx for right leg cellulitis now currently on zyvox, zosyn. we are being consulted on hospital day  5. Left second toe with ulceration.    Past Medical History:   Diagnosis Date   • Asthma    • Coronary artery disease    • Diabetes mellitus (HCC) 2000    started on inuslin 12/2018; started on po meds in 2000; checking blood sugars daily    • Disease of thyroid gland     po meds daily for hypothyroidism    • History of fracture as a child     rt leg- severe    • Hyperlipidemia    • Hypertension    • Hypothyroidism    • Peripheral neuropathy    • Peripheral vascular disease (HCC)     s/p angiogram 2/19-needs stent in left leg    • RBBB    • Right knee pain    • Vitamin D deficiency        Past Surgical History:   Procedure Laterality Date   • APPENDECTOMY     • CARDIAC CATHETERIZATION N/A 2/14/2019    Procedure: Left Heart Cath;  Surgeon: Cooper Apodaca MD;  Location: [facility] CATH INVASIVE LOCATION;  Service: Cardiology   • CARDIAC ELECTROPHYSIOLOGY PROCEDURE N/A 5/18/2022    Procedure: DEVICE IMPLANT;  Surgeon:  Cooper Apodaca MD;  Location:  [facility] CATH INVASIVE LOCATION;  Service: Cardiology;  Laterality: N/A;   • COLONOSCOPY     • CORONARY ARTERY BYPASS GRAFT N/A 3/22/2019    Procedure: MEDIAN STERNOTOMY, CORONARY ARTERY BYPASS GRAFT X3, UTILIZING THE LEFT INTERNAL MAMMARY ARTERY, EVH AND OPEN HARVEST OF THE RIGHT GREATER SAPHENOUS VEIN, EXPLORATION OF THE LEFT LEG;  Surgeon: Ap Au MD;  Location:  [facility] OR;  Service: Cardiothoracic   • EYE SURGERY Bilateral     cataracts    • INTERVENTIONAL RADIOLOGY PROCEDURE N/A 7/29/2021    Procedure: Abdominal Aortagram with Runoff;  Surgeon: Jermaine Hernandez MD;  Location:  [facility] CATH INVASIVE LOCATION;  Service: Cardiovascular;  Laterality: N/A;   • KNEE ARTHROSCOPY      right x 2, left x 1   • LACERATION REPAIR      right leg   • LEG SURGERY      2 for fracture of rt leg    • TONSILLECTOMY      Adnoidectomy   • TRANS METATARSAL AMPUTATION Left 8/2/2022    Procedure: GREAT TOE AMPUTATION LEFT;  Surgeon: Gopal Márquez MD;  Location:  [facility] OR;  Service: Vascular;  Laterality: Left;       Family History   Problem Relation Age of Onset   • Coronary artery disease Mother    • Diabetes Mother    • Cancer Father        Social History     Socioeconomic History   • Marital status:    • Number of children: 2   Tobacco Use   • Smoking status: Never Smoker   • Smokeless tobacco: Never Used   Vaping Use   • Vaping Use: Never used   Substance and Sexual Activity   • Alcohol use: No   • Drug use: No   • Sexual activity: Defer       Allergies   Allergen Reactions   • Vancomycin Other (See Comments)     Kidney failure per last admission         Medication:    Current Facility-Administered Medications:   •  acetaminophen (TYLENOL) tablet 650 mg, 650 mg, Oral, Q4H PRN, 650 mg at 10/07/22 0124 **OR** acetaminophen (TYLENOL) 160 MG/5ML solution 650 mg, 650 mg, Oral, Q4H PRN **OR** acetaminophen (TYLENOL) suppository 650 mg, 650 mg, Rectal, Q4H PRN, Renetta Gatica II,  DO  •  apixaban (ELIQUIS) tablet 5 mg, 5 mg, Oral, Q12H, Jacinta Daniel, DO, 5 mg at 10/06/22 2033  •  aspirin chewable tablet 81 mg, 81 mg, Oral, Daily, Renetta Gatica M II, DO, 81 mg at 10/06/22 0833  •  atorvastatin (LIPITOR) tablet 20 mg, 20 mg, Oral, Nightly, Sarah Pineda APRN, 20 mg at 10/06/22 2033  •  bethanechol (URECHOLINE) tablet 10 mg, 10 mg, Oral, TID, Renetta Gatica M II, DO, 10 mg at 10/06/22 1715  •  sennosides-docusate (PERICOLACE) 8.6-50 MG per tablet 2 tablet, 2 tablet, Oral, BID, 2 tablet at 10/06/22 0833 **AND** polyethylene glycol (MIRALAX) packet 17 g, 17 g, Oral, Daily PRN **AND** bisacodyl (DULCOLAX) EC tablet 5 mg, 5 mg, Oral, Daily PRN **AND** bisacodyl (DULCOLAX) suppository 10 mg, 10 mg, Rectal, Daily PRN, Renetta Gatica II, DO  •  bumetanide (BUMEX) injection 1 mg, 1 mg, Intravenous, Daily, Jacinta Daniel, DO, 1 mg at 10/06/22 0835  •  carvedilol (COREG) tablet 3.125 mg, 3.125 mg, Oral, BID With Meals, Renetta Gatica II, DO, 3.125 mg at 10/06/22 1715  •  dextrose (D50W) (25 g/50 mL) IV injection 25 g, 25 g, Intravenous, Q15 Min PRN, Renetta Gatica M II, DO  •  dextrose (GLUTOSE) oral gel 15 g, 15 g, Oral, Q15 Min PRN, Renetta Gatica M II, DO  •  Diclofenac Sodium (VOLTAREN) 1 % gel 2 g, 2 g, Topical, 4x Daily PRN, Renetta Gatica M II, DO  •  doxycycline (MONODOX) capsule 100 mg, 100 mg, Oral, Q12H, Jenna Campbell MD, 100 mg at 10/06/22 2033  •  famotidine (PEPCID) tablet 20 mg, 20 mg, Oral, Daily, Renetta Gatica II, DO, 20 mg at 10/06/22 0833  •  glucagon (human recombinant) (GLUCAGEN DIAGNOSTIC) injection 1 mg, 1 mg, Intramuscular, Q15 Min PRN, Renetta Gatica II, DO  •  HYDROcodone-acetaminophen (NORCO) 5-325 MG per tablet 1 tablet, 1 tablet, Oral, Q4H PRN, Renteta Gatica II, DO, 1 tablet at 10/04/22 0137  •  Influenza Vac High-Dose Quad (FLUZONE HIGH DOSE) injection 0.7 mL, 0.7 mL, Intramuscular, During Hospitalization, Renetta Gatica II, DO  •  insulin detemir  (LEVEMIR) injection 10 Units, 10 Units, Subcutaneous, Nightly, Renetta Gatica M II, DO, 10 Units at 10/05/22 2018  •  Insulin Lispro (humaLOG) injection 0-9 Units, 0-9 Units, Subcutaneous, TID AC, En, Renetta M II, DO  •  ipratropium-albuterol (DUO-NEB) nebulizer solution 3 mL, 3 mL, Nebulization, Q4H PRN, Renetta Gatica M II, DO  •  lactobacillus acidophilus (RISAQUAD) capsule 1 capsule, 1 capsule, Oral, Daily, EnRenetta russo M II, DO, 1 capsule at 10/06/22 0833  •  levothyroxine (SYNTHROID, LEVOTHROID) tablet 75 mcg, 75 mcg, Oral, Q AM, En, Renetta M II, DO, 75 mcg at 10/07/22 0530  •  Magnesium Sulfate 2 gram infusion - Mg less than or equal to 1.5 mg/dL, 2 g, Intravenous, PRN **OR** Magnesium Sulfate 1 gram infusion - Mg 1.6-1.9 mg/dL, 1 g, Intravenous, PRN, Sarah Pineda, APRN  •  nitroglycerin (NITROSTAT) SL tablet 0.4 mg, 0.4 mg, Sublingual, Q5 Min PRN, Sarah Pineda, APRN  •  ondansetron (ZOFRAN) tablet 4 mg, 4 mg, Oral, Q6H PRN **OR** ondansetron (ZOFRAN) injection 4 mg, 4 mg, Intravenous, Q6H PRN, Renetta Gatica II, DO, 4 mg at 10/02/22 1509  •  Pharmacy Consult - [facility], , Does not apply, Daily, Mya Han, PharmD  •  piperacillin-tazobactam (ZOSYN) 3.375 g in iso-osmotic dextrose 50 ml (premix), 3.375 g, Intravenous, Q8H, Renetta Gatica M II, DO, 3.375 g at 10/07/22 0124  •  sertraline (ZOLOFT) tablet 25 mg, 25 mg, Oral, Nightly, Jenna Campbell MD, 25 mg at 10/06/22 2033  •  sodium chloride 0.9 % flush 10 mL, 10 mL, Intravenous, PRN, Sarah Pineda, APRN  •  sodium chloride 0.9 % flush 10 mL, 10 mL, Intravenous, Q12H, Sarah Pineda, APRN, 10 mL at 10/06/22 2034  •  sodium chloride 0.9 % flush 10 mL, 10 mL, Intravenous, PRN, Sarah Pineda, APRN  •  sodium chloride 0.9 % flush 10 mL, 10 mL, Intravenous, Q12H, Renetta Gatica II, DO, 10 mL at 10/06/22 2034  •  sodium chloride 0.9 % flush 10 mL, 10 mL, Intravenous, PRN, Renetta Gatica II, DO  •  sodium chloride nasal spray 1 spray, 1  spray, Nasal, PRN, Renetta Gatica II, DO  •  tamsulosin (FLOMAX) 24 hr capsule 0.4 mg, 0.4 mg, Oral, Daily, Renetta Gatica II, DO, 0.4 mg at 10/06/22 0834    Facility-Administered Medications Ordered in Other Encounters:   •  Chlorhexidine Gluconate Cloth 2 % pads 1 application, 1 application, Topical, Q12H PRN, Mohan Arauz, PA    Antibiotics:  Anti-Infectives (From admission, onward)    Ordered     Dose/Rate Route Frequency Start Stop    10/06/22 1130  doxycycline (MONODOX) capsule 100 mg        Ordering Provider: Jenna Campbell MD    100 mg Oral Every 12 Hours Scheduled 10/06/22 2100 10/13/22 2059    10/02/22 0948  piperacillin-tazobactam (ZOSYN) 3.375 g in iso-osmotic dextrose 50 ml (premix)        Ordering Provider: Renetta Gatica II DO    3.375 g  over 4 Hours Intravenous Every 8 Hours 10/02/22 1800 10/07/22 1759    10/02/22 0948  piperacillin-tazobactam (ZOSYN) 3.375 g in iso-osmotic dextrose 50 ml (premix)        Ordering Provider: Renetta Gatica II, DO    3.375 g  over 30 Minutes Intravenous Once 10/02/22 1100 10/02/22 1231    10/02/22 0348  cefTRIAXone (ROCEPHIN) 2 g/100 mL 0.9% NS IVPB (MBP)        Ordering Provider: Salinas Dunn MD    2 g  over 30 Minutes Intravenous Once 10/02/22 0350 10/02/22 0527            Review of Systems:  Denies fevers chills chest pain fast heart rate cough shortness of breath diarrhea reports insomnia    Rest of review of systems are unremarkable      Physical Exam:   Vital Signs  Temp (24hrs), Av.8 °F (36.6 °C), Min:97.5 °F (36.4 °C), Max:98 °F (36.7 °C)    Temp  Min: 97.5 °F (36.4 °C)  Max: 98 °F (36.7 °C)  BP  Min: 108/55  Max: 136/76  Pulse  Min: 55  Max: 71  Resp  Min: 16  Max: 18  SpO2  Min: 91 %  Max: 96 %    GENERAL: Awake and alert, in no acute distress.   HEENT: Normocephalic, atraumatic.  PERRL. EOMI. No conjunctival injection. No icterus. Oropharynx clear without evidence of thrush or exudate. No evidence of peridontal disease.       HEART: RRR; No murmur,    LUNGS: Clear to auscultation bilaterally   ABDOMEN: Soft, nontender,  EXT:  1+ edema, right leg erythema; left 2nd toe with ulceration of pip joint  :  Without Fonseca catheter.  MSK: No joint deformity  SKIN: Warm and dry without cutaneous eruptions on Inspection/palpation.    NEURO: Oriented to PPT.  PSYCHIATRIC: Normal insight and judgement. Cooperative with PE    Laboratory Data    Results from last 7 days   Lab Units 10/06/22  0716 10/05/22  0844 10/04/22  0925   WBC 10*3/mm3 4.94 5.16 5.65   HEMOGLOBIN g/dL 10.8* 11.0* 10.4*   HEMATOCRIT % 34.3* 35.9* 32.9*   PLATELETS 10*3/mm3 152 154 153     Results from last 7 days   Lab Units 10/06/22  0716   SODIUM mmol/L 140   POTASSIUM mmol/L 4.7   CHLORIDE mmol/L 102   CO2 mmol/L 27.0   BUN mg/dL 29*   CREATININE mg/dL 1.86*   GLUCOSE mg/dL 85   CALCIUM mg/dL 9.0     Results from last 7 days   Lab Units 10/06/22  0716   ALK PHOS U/L 68   BILIRUBIN mg/dL 0.2   ALT (SGPT) U/L 11   AST (SGOT) U/L 7     Results from last 7 days   Lab Units 10/02/22  0332   SED RATE mm/hr 6     Results from last 7 days   Lab Units 10/02/22  0332   CRP mg/dL 0.40     Results from last 7 days   Lab Units 10/02/22  0332   LACTATE mmol/L 1.9             Estimated Creatinine Clearance: 45.5 mL/min (A) (by C-G formula based on SCr of 1.86 mg/dL (H)).      Microbiology:  Blood Culture   Date Value Ref Range Status   10/02/2022 No growth at 5 days  Final   10/02/2022 No growth at 5 days  Final     No results found for: BCIDPCR, CXREFLEX, CSFCX, CULTURETIS  No results found for: CULTURES, HSVCX, URCX  No results found for: EYECULTURE, GCCX, HSVCULTURE, LABHSV  No results found for: LEGIONELLA, MRSACX, MUMPSCX, MYCOPLASCX  No results found for: NOCARDIACX, STOOLCX  No results found for: THROATCX, UNSTIMCULT, URINECX, CULTURE, VZVCULTUR  No results found for: VIRALCULTU, WOUNDCX        Radiology:  Imaging Results (Last 72 Hours)     ** No results found for the last 72  hours. **            Impression:   Left great toe amputation 8/2/2022  Ischemic cardiomyopathy  Right leg cellulitis  Vancomycin allergy  pacemaker    PLAN/RECOMMENDATIONS:   Thank you for asking us to see Jermaine Singh, I recommend the following:      Favor streptococcal process of right leg. This appears to be improving    D/c zosyn  D/c zyvox  Start oral doxycyline 100 mg po bid x 1 month    I have explained to family that given pacemaker, imaging of foot with bone scan, twbc is problematic;  Regardless of what imaging says I would have a low threshold for surgical amputation in hopes of reducing risk for seeding endovascular hardware.    Left second toe with ulceration, no severe swelling, no malodorous drainage; Patient could be monitored as an outpatient and can bear weight on left leg;   Would consider outpatient f/u and toe amputation if worsening.    D/w family, Dr. Márquez, Dr. Campbell    F/u with me in 1 month             Sage Braga MD  10/7/2022  08:43 EDT

## 2022-10-07 NOTE — PLAN OF CARE
Goal Outcome Evaluation:  Plan of Care Reviewed With: patient            VSS, RA, sitting up in chair and ambulated 80 ft today. No complaints this shift. Plan to go home with home health. Continuing current plan of care.

## 2022-10-07 NOTE — CASE MANAGEMENT/SOCIAL WORK
Continued Stay Note  [facility]     Patient Name: Jermaine Singh  MRN: [id number]  Today's Date: 10/7/2022    Admit Date: 10/2/2022    Plan: CM update   Discharge Plan     Row Name 10/07/22 1536       Plan    Plan CM update    Plan Comments Goal remains for patient to return home with [facility] health upon discharge. Resumption of care orders have been faxed to them. CM will need to notify on day of dc and fax dc summary to [phone]. unclear if patient will have additional toe amputation during this hospital stay or in the future - CM will continue to follow -yariel [phone]    Final Discharge Disposition Code 06 - home with home health care               Discharge Codes    No documentation.                     Yariel Crenshaw RN

## 2022-10-07 NOTE — PLAN OF CARE
Goal Outcome Evaluation:  Plan of Care Reviewed With: patient        Progress: no change  Outcome Evaluation: VSS on room air, prn tylenol given for c/o headache. Pt refused 10 units of levemir last night, stated the dose is too high and he didnt want it. Pt monitors his blood sugar with dexcom. Pt still talks about his wishes to have sleep aids, but they have been ordered before and pt didnt like the side effects. Pt noted to have one apneic like episode tonight, O2 dropped to 70% but recovered quickly.

## 2022-10-07 NOTE — THERAPY TREATMENT NOTE
Patient Name: Jermaine Singh  : 1945    MRN: [id number]                              Today's Date: 10/7/2022       Admit Date: 10/2/2022    Visit Dx:     ICD-10-CM ICD-9-CM   1. Angina at rest ([facility])  I20.8 413.9   2. Systolic congestive heart failure, unspecified HF chronicity ([facility])  I50.20 428.20     428.0   3. 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  Preserved LV function   • Type 2 diabetes mellitus ([facility])   • Hypertension   • Hyperlipidemia   • Hypothyroidism (acquired)   • Vitamin D deficiency on Rx    • Serum Cr 1.32 on admission.  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 Au   • Dizziness   • Atherosclerosis of native artery of both lower extremities with intermittent claudication ([facility])   • SSS (sick sinus syndrome) ([facility])   • Subacute osteomyelitis of left foot ([facility])   • Lactic acidosis   • Proximal phalanx fracture of the second digit extending into the second metatarsal joint   • [place] (acute kidney injury) ([facility])   • Acute on chronic HFrEF (heart failure with reduced ejection fraction) ([facility])   • DVT, bilateral lower limbs ([facility])   • Elevated troponin   • Cellulitis of right lower extremity     Past Medical History:   Diagnosis Date   • Asthma    • Coronary artery disease    • Diabetes mellitus ([facility])     started on [place] 2018; started on po meds in ; checking blood sugars daily    • Disease of thyroid gland     po meds daily for hypothyroidism    • History of fracture as a child     rt leg- severe    • Hyperlipidemia    • Hypertension    • Hypothyroidism    • Peripheral neuropathy    • Peripheral vascular disease ([facility])     s/p angiogram -needs stent in left leg    • RBBB    • Right knee pain    • Vitamin D deficiency      Past Surgical History:   Procedure Laterality Date   • APPENDECTOMY     • CARDIAC  CATHETERIZATION N/A 2/14/2019    Procedure: Left Heart Cath;  Surgeon: Cooper Apodaca MD;  Location:  [facility];  Service: Cardiology   • CARDIAC ELECTROPHYSIOLOGY PROCEDURE N/A 5/18/2022    Procedure: DEVICE IMPLANT;  Surgeon: Cooper Apodaca MD;  Location:  [facility] INVASIVE LOCATION;  Service: Cardiology;  Laterality: N/A;   • COLONOSCOPY     • CORONARY ARTERY BYPASS GRAFT N/A 3/22/2019    Procedure: MEDIAN STERNOTOMY, CORONARY ARTERY BYPASS GRAFT X3, UTILIZING THE LEFT INTERNAL MAMMARY ARTERY, EVH AND OPEN HARVEST OF THE RIGHT GREATER SAPHENOUS VEIN, EXPLORATION OF THE LEFT LEG;  Surgeon: Ap Au MD;  Location:  [facility] OR;  Service: Cardiothoracic   • EYE SURGERY Bilateral     cataracts    • INTERVENTIONAL RADIOLOGY PROCEDURE N/A 7/29/2021    Procedure: Abdominal Aortagram with Runoff;  Surgeon: Jermaine Hernandez MD;  Location:  [facility]ATH [facility];  Service: Cardiovascular;  Laterality: N/A;   • KNEE ARTHROSCOPY      right x 2, left x 1   • LACERATION REPAIR      right leg   • LEG SURGERY      2 for fracture of rt leg    • TONSILLECTOMY      Adnoidectomy   • TRANS METATARSAL AMPUTATION Left 8/2/2022    Procedure: GREAT TOE AMPUTATION LEFT;  Surgeon: Gopal Márquez MD;  Location:  [facility];  Service: Vascular;  Laterality: Left;      General Information     Row Name 10/07/22 1406          Physical Therapy Time and Intention    Document Type therapy note (daily note)  -ROSELIA     Mode of Treatment physical therapy  -ROSELIA     Row Name 10/07/22 1406          General Information    Patient Profile Reviewed yes  -ROSELIA     Prior Level of Function independent:;bed mobility;ADL's;gait;transfer  -ROSELIA     Existing Precautions/Restrictions fall;cardiac  -ROSELIA     Barriers to Rehab medically complex  -ROSELIA     Row Name 10/07/22 1406          Living Environment    People in Home spouse  -ROSELIA     Row Name 10/07/22 1406          Home Main Entrance    Number of Stairs, Main Entrance  none  -ROSELIA     Row Name 10/07/22 1406          Cognition    Orientation Status (Cognition) oriented x 4  -ROSELIA     Row Name 10/07/22 1406          Safety Issues, Functional Mobility    Safety Issues Affecting Function (Mobility) safety precautions follow-through/compliance;insight into deficits/self-awareness;awareness of need for assistance;safety precaution awareness  -ROSELIA     Impairments Affecting Function (Mobility) balance;endurance/activity tolerance;strength  -ROSELIA           User Key  (r) = Recorded By, (t) = Taken By, (c) = Cosigned By    Initials Name Provider Type    Montserrat Ibanez PT Physical Therapist               Mobility     Row Name 10/07/22 1407          Bed Mobility    Comment, (Bed Mobility) patient is OOB and returns to the chair  -ROSELIA     Row Name 10/07/22 1407          Transfers    Comment, (Transfers) patient has off loading shoe to left foot  -ROSELIA     Row Name 10/07/22 1407          Bed-Chair Transfer    Bed-Chair [place] (Transfers) minimum assist (75% patient effort)  -ROSELIA     Assistive Device (Bed-Chair Transfers) walker, front-wheeled  -ROSELIA     Comment, (Bed-Chair Transfer) patient uses UE's to assist with standing patient needs assist shifting weight forward  -ROSELIA     Row Name 10/07/22 1407          Sit-Stand Transfer    Sit-Stand [place] (Transfers) minimum assist (75% patient effort)  -ROSELIA     Assistive Device (Sit-Stand Transfers) walker, front-wheeled  -ROSELIA     Row Name 10/07/22 1407          Gait/Stairs (Locomotion)    [place] Level (Gait) contact guard  -ROSELIA     Assistive Device (Gait) walker, front-wheeled  -ROSELIA     Distance in Feet (Gait) 2x40  -ROSELIA     Deviations/Abnormal Patterns (Gait) base of support, wide  -ROSELIA     Bilateral Gait Deviations forward flexed posture  -ROSELIA     Comment, (Gait/Stairs) on second walk patient was able to take a longer stride. he is limited by pain from scrotal swelling. mild dizziness but BP was 130/67. patient had 1 LOB with turning  -ROSELIA      Row Name 10/07/22 1407          Mobility    Extremity Weight-bearing Status left lower extremity  -ROSELIA     Left Lower Extremity (Weight-bearing Status) weight-bearing as tolerated (WBAT)  -           User Key  (r) = Recorded By, (t) = Taken By, (c) = Cosigned By    Initials Name Provider Type    Montserrat Ibanez PT Physical Therapist               Obj/Interventions     Row Name 10/07/22 1411          Motor Skills    Therapeutic Exercise hip;knee;ankle  -     Row Name 10/07/22 1411          Hip (Therapeutic Exercise)    Hip (Therapeutic Exercise) AROM (active range of motion)  -     Hip AROM (Therapeutic Exercise) bilateral;flexion;extension;5 repetitions;sitting  -     Row Name 10/07/22 1411          Knee (Therapeutic Exercise)    Knee (Therapeutic Exercise) AROM (active range of motion)  -     Knee AROM (Therapeutic Exercise) bilateral;flexion;extension;20 repititions;sitting  -     Row Name 10/07/22 1411          Ankle (Therapeutic Exercise)    Ankle (Therapeutic Exercise) AROM (active range of motion)  -     Ankle AROM (Therapeutic Exercise) bilateral;dorsiflexion;plantarflexion;20 repititions;sitting  -     Row Name 10/07/22 1411          Balance    Balance Assessment sitting static balance;sitting dynamic balance;standing static balance;standing dynamic balance  -ROSELIA     Static Sitting Balance independent  -ROSELIA     Dynamic Sitting Balance independent  -ROSELIA     Position, Sitting Balance unsupported;sitting in chair  -ROSELIA     Static Standing Balance contact guard  -ROSELIA     Dynamic Standing Balance contact guard  -ROSELIA     Position/Device Used, Standing Balance supported;walker, front-wheeled  -ROSELIA     Balance Interventions standing;dynamic;weight shifting activity  -           User Key  (r) = Recorded By, (t) = Taken By, (c) = Cosigned By    Initials Name Provider Type    Montserrat Ibanez PT Physical Therapist               Goals/Plan     Row Name 10/07/22 1415          Gait Training Goal 1  (PT)    Activity/Assistive Device (Gait Training Goal 1, PT) gait (walking locomotion);walker, rolling;decrease fall risk;improve balance and speed;increase endurance/gait distance;maintain weight-bearing status  -ROSELIA     [place] Level (Gait Training Goal 1, PT) modified independence  -ROSELIA     Distance (Gait Training Goal 1, PT) 100  -ROSELIA     Time Frame (Gait Training Goal 1, PT) 10 days  -ROSELIA     Progress/Outcome (Gait Training Goal 1, PT) continuing progress toward goal;goal revised this date;goal ongoing  -ROSELIA     Row Name 10/07/22 1415          Therapy Assessment/Plan (PT)    Planned Therapy Interventions (PT) balance training;bed mobility training;gait training;home exercise program;strengthening;transfer training  -ROSELIA           User Key  (r) = Recorded By, (t) = Taken By, (c) = Cosigned By    Initials Name Provider Type    Montserrat Ibanez, PT Physical Therapist               Clinical Impression     Row Name 10/07/22 1412          Pain    Pretreatment Pain Rating 4/10  -ROSELIA     Posttreatment Pain Rating 5/10  -ROSELIA     Pre/Posttreatment Pain Comment scrotum due to swelling placed patient in scrotal sling in sitting  -ROSELIA     Pain Intervention(s) Repositioned;Ambulation/increased activity  -ROSELIA     Row Name 10/07/22 1412          Plan of Care Review    Plan of Care Reviewed With patient;spouse  -ROSELIA     Progress improving  -ROSELIA     Outcome Evaluation patient was able to ambulate 2x40 ft with one sitting rest between. patient was able to perform LE exercises and educated on breathing and relaxation techniques. patient required min assist to transfer. off loading shoe to left foot fot all mobility.  -ROSELIA     Row Name 10/07/22 1412          Therapy Assessment/Plan (PT)    Patient/Family Therapy Goals Statement (PT) go home get stronger  -ROSELIA     Rehab Potential (PT) good, to achieve stated therapy goals  -ROSELIA     Criteria for Skilled Interventions Met (PT) yes;skilled treatment is necessary  -ROSELIA     Therapy Frequency  (PT) daily  -ROESLIA     Row Name 10/07/22 1412          Vital Signs    Pre Patient Position Sitting  -ROSELIA     Intra Patient Position Standing  -ROSELIA     Post Patient Position Sitting  -ROSELIA     Row Name 10/07/22 1412          Positioning and Restraints    Pre-Treatment Position sitting in chair/recliner  -ROSELIA     Post Treatment Position chair  -ROSELIA     In Chair notified nsg;reclined;sitting;call light within reach;encouraged to call for assist;exit alarm on  -ROSELIA           User Key  (r) = Recorded By, (t) = Taken By, (c) = Cosigned By    Initials Name Provider Type    Montserrat Ibanez, PT Physical Therapist               Outcome Measures     Row Name 10/07/22 1416 10/07/22 0800       How much help from another person do you currently need...    Turning from your back to your side while in flat bed without using bedrails? 4  -ROSELIA 4  -EB    Moving from lying on back to sitting on the side of a flat bed without bedrails? 4  -ROSELIA 4  -EB    Moving to and from a bed to a chair (including a wheelchair)? 3  -ROSELIA 3  -EB    Standing up from a chair using your arms (e.g., wheelchair, bedside chair)? 3  -ROSELIA 3  -EB    Climbing 3-5 steps with a railing? 2  -ROSELIA 2  -EB    To walk in hospital room? 3  -ROSELIA 3  -EB    AM-PAC 6 Clicks Score (PT) 19  -ROSELIA 19  -EB    Highest level of mobility 6 --> Walked 10 steps or more  -ROSELIA 6 --> Walked 10 steps or more  -EB          User Key  (r) = Recorded By, (t) = Taken By, (c) = Cosigned By    Initials Name Provider Type    Montserrat Ibanez, PT Physical Therapist    Graciela Ledbetter RN Registered Nurse                             Physical Therapy Education                 Title: PT OT SLP Therapies (In Progress)     Topic: Physical Therapy (In Progress)     Point: Mobility training (In Progress)     Learning Progress Summary           Patient Acceptance, E, NR by ROSELIA at 10/7/2022 1330    Acceptance, E,D, VU,NR by MB at 10/4/2022 1559                   Point: Home exercise program (In Progress)      Learning Progress Summary           Patient Acceptance, E, NR by ROSELIA at 10/7/2022 1330    Acceptance, E,D, VU,NR by MB at 10/4/2022 1559                   Point: Body mechanics (In Progress)     Learning Progress Summary           Patient Acceptance, E, NR by ROSELIA at 10/7/2022 1330    Acceptance, E,D, VU,NR by MB at 10/4/2022 1559                   Point: Precautions (In Progress)     Learning Progress Summary           Patient Acceptance, E, NR by ROSELIA at 10/7/2022 1330    Acceptance, E,D, VU,NR by MB at 10/4/2022 1559                               User Key     Initials Effective Dates Name Provider Type Discipline    ROSELIA 06/16/21 -  Montserrat Knox PT Physical Therapist PT    MB 06/16/21 -  Dixie Carrizales PT Physical Therapist PT              PT Recommendation and Plan  Planned Therapy Interventions (PT): balance training, bed mobility training, gait training, home exercise program, strengthening, transfer training  Plan of Care Reviewed With: patient, spouse  Progress: improving  Outcome Evaluation: patient was able to ambulate 2x40 ft with one sitting rest between. patient was able to perform LE exercises and educated on breathing and relaxation techniques. patient required min assist to transfer. off loading shoe to left foot fot all mobility.     Time Calculation:    PT Charges     Row Name 10/07/22 1417             Time Calculation    Start Time 1330  -ROSELIA      PT Received On 10/07/22  -      PT Goal Re-Cert Due Date 10/14/22  -              Time Calculation- PT    Total Timed Code Minutes- PT 30 minute(s)  -ROSELIA              Timed Charges    47757 - PT Therapeutic Activity Minutes 30  -ROSELIA              Total Minutes    Timed Charges Total Minutes 30  -ROSELIA       Total Minutes 30  -ROSELIA            User Key  (r) = Recorded By, (t) = Taken By, (c) = Cosigned By    Initials Name Provider Type    Montserrat Ibanez, PT Physical Therapist              Therapy Charges for Today     Code Description Service Date  Service Provider Modifiers Qty    [id number]  PT THERAPEUTIC ACT EA 15 MIN 10/7/2022 Montserrat Knox, PT GP 2          PT G-Codes  Outcome Measure Options: AM-PAC 6 Clicks Basic Mobility (PT)  AM-PAC 6 Clicks Score (PT): 19  AM-PAC 6 Clicks Score (OT): 17    Montserrat Knox, SETH  10/7/2022

## 2022-10-07 NOTE — PROGRESS NOTES
Cardiothoracic Surgery Progress Note      POD #: Left great toe amputation August 2, 2022     LOS: 5 days      Subjective: Awake alert    Objective:  Vital Signs vital signs below noted T-max past 24 hours 97.9 °F  Temp:  [97.5 °F (36.4 °C)-97.9 °F (36.6 °C)] 97.6 °F (36.4 °C)  Heart Rate:  [55-74] 64  Resp:  [16-18] 18  BP: (108-129)/(55-81) 123/74    Physical Exam:   General Appearance: Oriented x3   Lungs:   Heart:   Skin: Middle phalangeal joint of the left second toe exposed   Incision: Amputation site dressing change.  Overall incision is intact with some dehiscence at the most distal portion.     Results:  Results from last 7 days   Lab Units 10/06/22  0716   WBC 10*3/mm3 4.94   HEMOGLOBIN g/dL 10.8*   HEMATOCRIT % 34.3*   PLATELETS 10*3/mm3 152     Results from last 7 days   Lab Units 10/06/22  0716   SODIUM mmol/L 140   POTASSIUM mmol/L 4.7   CHLORIDE mmol/L 102   CO2 mmol/L 27.0   BUN mg/dL 29*   CREATININE mg/dL 1.86*   GLUCOSE mg/dL 85   CALCIUM mg/dL 9.0         Assessment: #1.  Postop left great toe transmetatarsal amputation August 2, 2022 with some minimal distal dehiscence  2.  Exposed middle phalangeal bone of the left second toe from hammertoe phenomena  3.  Ischemic cardiomyopathy and heart failure  4.  Status post remote coronary artery stenting      Plan: Overall medical manage per hospitalist.  Heart failure management per Dr. Apodaca of cardiology.  Ideally this patient needs a left second toe amputation we will be discussing that with Dr. Apodaca regarding the risk involved and also will discuss possible antibiotic coverage while hospitalized with infectious disease/Dr. Richardson Márquez MD - 10/07/22 - 06:00 EDT

## 2022-10-08 LAB
ALBUMIN SERPL-MCNC: 3.3 G/DL (ref 3.5–5.2)
ALBUMIN/GLOB SERPL: 1.6 G/DL
ALP SERPL-CCNC: 62 U/L (ref 39–117)
ALT SERPL W P-5'-P-CCNC: 13 U/L (ref 1–41)
ANION GAP SERPL CALCULATED.3IONS-SCNC: 10 MMOL/L (ref 5–15)
AST SERPL-CCNC: 11 U/L (ref 1–40)
BASOPHILS # BLD AUTO: 0.06 10*3/MM3 (ref 0–0.2)
BASOPHILS NFR BLD AUTO: 1.1 % (ref 0–1.5)
BILIRUB SERPL-MCNC: 0.2 MG/DL (ref 0–1.2)
BUN SERPL-MCNC: 24 MG/DL (ref 8–23)
BUN/CREAT SERPL: 13.6 (ref 7–25)
CALCIUM SPEC-SCNC: 8.8 MG/DL (ref 8.6–10.5)
CHLORIDE SERPL-SCNC: 102 MMOL/L (ref 98–107)
CO2 SERPL-SCNC: 25 MMOL/L (ref 22–29)
CREAT SERPL-MCNC: 1.77 MG/DL (ref 0.76–1.27)
DEPRECATED RDW RBC AUTO: 45 FL (ref 37–54)
EGFRCR SERPLBLD CKD-EPI 2021: 39.1 ML/MIN/1.73
EOSINOPHIL # BLD AUTO: 0.25 10*3/MM3 (ref 0–0.4)
EOSINOPHIL NFR BLD AUTO: 4.7 % (ref 0.3–6.2)
ERYTHROCYTE [DISTWIDTH] IN BLOOD BY AUTOMATED COUNT: 13.5 % (ref 12.3–15.4)
GLOBULIN UR ELPH-MCNC: 2.1 GM/DL
GLUCOSE BLDC GLUCOMTR-MCNC: 138 MG/DL (ref 70–130)
GLUCOSE BLDC GLUCOMTR-MCNC: 138 MG/DL (ref 70–130)
GLUCOSE BLDC GLUCOMTR-MCNC: 185 MG/DL (ref 70–130)
GLUCOSE BLDC GLUCOMTR-MCNC: 197 MG/DL (ref 70–130)
GLUCOSE SERPL-MCNC: 141 MG/DL (ref 65–99)
HCT VFR BLD AUTO: 34.4 % (ref 37.5–51)
HGB BLD-MCNC: 11.1 G/DL (ref 13–17.7)
IMM GRANULOCYTES # BLD AUTO: 0.01 10*3/MM3 (ref 0–0.05)
IMM GRANULOCYTES NFR BLD AUTO: 0.2 % (ref 0–0.5)
LYMPHOCYTES # BLD AUTO: 1.15 10*3/MM3 (ref 0.7–3.1)
LYMPHOCYTES NFR BLD AUTO: 21.6 % (ref 19.6–45.3)
MCH RBC QN AUTO: 29.4 PG (ref 26.6–33)
MCHC RBC AUTO-ENTMCNC: 32.3 G/DL (ref 31.5–35.7)
MCV RBC AUTO: 91.2 FL (ref 79–97)
MONOCYTES # BLD AUTO: 0.56 10*3/MM3 (ref 0.1–0.9)
MONOCYTES NFR BLD AUTO: 10.5 % (ref 5–12)
NEUTROPHILS NFR BLD AUTO: 3.3 10*3/MM3 (ref 1.7–7)
NEUTROPHILS NFR BLD AUTO: 61.9 % (ref 42.7–76)
NRBC BLD AUTO-RTO: 0 /100 WBC (ref 0–0.2)
PLATELET # BLD AUTO: 169 10*3/MM3 (ref 140–450)
PMV BLD AUTO: 11.7 FL (ref 6–12)
POTASSIUM SERPL-SCNC: 4.4 MMOL/L (ref 3.5–5.2)
PROT SERPL-MCNC: 5.4 G/DL (ref 6–8.5)
RBC # BLD AUTO: 3.77 10*6/MM3 (ref 4.14–5.8)
SODIUM SERPL-SCNC: 137 MMOL/L (ref 136–145)
WBC NRBC COR # BLD: 5.33 10*3/MM3 (ref 3.4–10.8)

## 2022-10-08 PROCEDURE — 99231 SBSQ HOSP IP/OBS SF/LOW 25: CPT | Performed by: NURSE PRACTITIONER

## 2022-10-08 PROCEDURE — 63710000001 INSULIN LISPRO (HUMAN) PER 5 UNITS: Performed by: INTERNAL MEDICINE

## 2022-10-08 PROCEDURE — 82962 GLUCOSE BLOOD TEST: CPT

## 2022-10-08 PROCEDURE — 85025 COMPLETE CBC W/AUTO DIFF WBC: CPT | Performed by: INTERNAL MEDICINE

## 2022-10-08 PROCEDURE — 99024 POSTOP FOLLOW-UP VISIT: CPT | Performed by: THORACIC SURGERY (CARDIOTHORACIC VASCULAR SURGERY)

## 2022-10-08 PROCEDURE — 63710000001 INSULIN DETEMIR PER 5 UNITS: Performed by: INTERNAL MEDICINE

## 2022-10-08 PROCEDURE — 80053 COMPREHEN METABOLIC PANEL: CPT | Performed by: INTERNAL MEDICINE

## 2022-10-08 RX ORDER — BUMETANIDE 1 MG/1
1 TABLET ORAL DAILY
Status: DISCONTINUED | OUTPATIENT
Start: 2022-10-09 | End: 2022-10-11 | Stop reason: HOSPADM

## 2022-10-08 RX ADMIN — ASPIRIN 81 MG 81 MG: 81 TABLET ORAL at 08:59

## 2022-10-08 RX ADMIN — BETHANECHOL CHLORIDE 10 MG: 10 TABLET ORAL at 08:59

## 2022-10-08 RX ADMIN — APIXABAN 5 MG: 5 TABLET, FILM COATED ORAL at 08:59

## 2022-10-08 RX ADMIN — BUMETANIDE 1 MG: 0.25 INJECTION, SOLUTION INTRAMUSCULAR; INTRAVENOUS at 09:00

## 2022-10-08 RX ADMIN — INSULIN DETEMIR 10 UNITS: 100 INJECTION, SOLUTION SUBCUTANEOUS at 20:21

## 2022-10-08 RX ADMIN — FAMOTIDINE 20 MG: 20 TABLET ORAL at 08:59

## 2022-10-08 RX ADMIN — APIXABAN 5 MG: 5 TABLET, FILM COATED ORAL at 20:19

## 2022-10-08 RX ADMIN — CARVEDILOL 3.12 MG: 3.12 TABLET, FILM COATED ORAL at 08:59

## 2022-10-08 RX ADMIN — BETHANECHOL CHLORIDE 10 MG: 10 TABLET ORAL at 18:01

## 2022-10-08 RX ADMIN — DOXYCYCLINE 100 MG: 100 CAPSULE ORAL at 08:59

## 2022-10-08 RX ADMIN — DOXYCYCLINE 100 MG: 100 CAPSULE ORAL at 20:19

## 2022-10-08 RX ADMIN — INSULIN LISPRO 2 UNITS: 100 INJECTION, SOLUTION INTRAVENOUS; SUBCUTANEOUS at 11:52

## 2022-10-08 RX ADMIN — BETHANECHOL CHLORIDE 10 MG: 10 TABLET ORAL at 20:35

## 2022-10-08 RX ADMIN — CARVEDILOL 3.12 MG: 3.12 TABLET, FILM COATED ORAL at 18:01

## 2022-10-08 RX ADMIN — Medication 10 ML: at 09:00

## 2022-10-08 RX ADMIN — Medication 1 CAPSULE: at 08:59

## 2022-10-08 RX ADMIN — LEVOTHYROXINE SODIUM 75 MCG: 0.07 TABLET ORAL at 05:30

## 2022-10-08 RX ADMIN — TAMSULOSIN HYDROCHLORIDE 0.4 MG: 0.4 CAPSULE ORAL at 08:59

## 2022-10-08 RX ADMIN — Medication 10 ML: at 20:21

## 2022-10-08 NOTE — PROGRESS NOTES
[facility] Medicine Services  PROGRESS NOTE    Patient Name: Jermaine Singh  : 1945  MRN: [id number]    Date of Admission: 10/2/2022  Primary Care Physician: Farooq Painter MD    Subjective   Subjective     CC:  Shortness of Air    HPI:  Patient sitting up in the chair this morning patient is alert and oriented x3.  Patient and spouse wishing to go to [facility] for rehab.  Patient was up with physical therapy yesterday and his wife said he was extremely weak.  He states he has been to [facility] before and did well.  He is still experiencing some discomfort from scrotal edema.  Patient states he slept better last night.    ROS:  Gen- No fevers, chills  CV- No chest pain, palpitations  Resp- No cough, dyspnea  GI- No N/V/D, abd pain  - (+) scrotal edema    Objective   Objective     Vital Signs:   Temp:  [97.4 °F (36.3 °C)-97.9 °F (36.6 °C)] 97.7 °F (36.5 °C)  Heart Rate:  [63-72] 63  Resp:  [18] 18  BP: (114-138)/(75-83) 128/77     Physical Exam:  Constitutional: Awake, alert, NAD  HENT: NCAT, mucous membranes moist  Respiratory: Clear to auscultation bilaterally, nonlabored respirations   Cardiovascular: RRR, no murmurs, rubs, or gallops  Gastrointestinal: Positive bowel sounds, soft, nontender, nondistended  Musculoskeletal: erythema and warmth to the RLE but improving. Left foot with dsg CDI from recent left toe amputation Aug 2022.   Psychiatric: Appropriate affect, cooperative  Neurologic: Oriented x 3 no focal deficit, speech clear  Skin: No rashes    Results Reviewed:  LAB RESULTS:      Lab 10/08/22  0655 10/06/22  0716 10/05/22  0844 10/04/22  0925 10/03/22  0038 10/02/22  0839 10/02/22  0332   WBC 5.33 4.94 5.16 5.65 5.13  --  5.50   HEMOGLOBIN 11.1* 10.8* 11.0* 10.4* 9.7*  --  10.2*   HEMATOCRIT 34.4* 34.3* 35.9* 32.9* 30.8*  --  32.4*   PLATELETS 169 152 154 153 131*  --  142   NEUTROS ABS 3.30 2.94  --   --  3.22  --  3.79   IMMATURE GRANS (ABS) 0.01 0.01  --    --  0.01  --  0.01   LYMPHS ABS 1.15 1.12  --   --  1.25  --  0.98   MONOS ABS 0.56 0.55  --   --  0.44  --  0.57   EOS ABS 0.25 0.27  --   --  0.15  --  0.11   MCV 91.2 93.0 93.7 93.2 93.6  --  93.4   SED RATE  --   --   --   --   --   --  6   CRP  --   --   --   --   --   --  0.40   PROCALCITONIN  --   --   --   --   --  0.08  --    LACTATE  --   --   --   --   --   --  1.9   PROTIME  --   --   --   --  17.9*  --   --    APTT  --   --   --   --  42.3*  --   --    HEPARIN ANTI-XA  --   --   --   --  >1.10*  --   --          Lab 10/08/22  0655 10/07/22  0638 10/06/22  0716 10/05/22  0845 10/04/22  0925 10/03/22  1044 10/02/22  0839   SODIUM 137 136 140 137 141   < >  --    POTASSIUM 4.4 4.9 4.7 4.8 5.1   < >  --    CHLORIDE 102 101 102 99 104   < >  --    CO2 25.0 23.0 27.0 26.0 26.0   < >  --    ANION GAP 10.0 12.0 11.0 12.0 11.0   < >  --    BUN 24* 26* 29* 31* 33*   < >  --    CREATININE 1.77* 1.70* 1.86* 2.04* 2.13*   < >  --    EGFR 39.1* 41.0* 36.8* 32.9* 31.3*   < >  --    GLUCOSE 141* 147* 85 117* 133*   < >  --    CALCIUM 8.8 9.0 9.0 9.1 9.1   < >  --    HEMOGLOBIN A1C  --   --   --   --   --   --  7.20*    < > = values in this interval not displayed.         Lab 10/08/22  0655 10/06/22  0716 10/05/22  0845 10/04/22  0925 10/02/22  0332   TOTAL PROTEIN 5.4* 5.6* 6.0 5.8* 6.1   ALBUMIN 3.30* 3.60 3.80 3.70 3.80   GLOBULIN 2.1 2.0 2.2 2.1 2.3   ALT (SGPT) 13 11 13 13 16   AST (SGOT) 11 7 9 10 18   BILIRUBIN 0.2 0.2 0.3 0.2 0.2   ALK PHOS 62 68 78 79 85         Lab 10/03/22  1044 10/03/22  0038 10/02/22  2205 10/02/22  2020 10/02/22  1435 10/02/22  0839 10/02/22  0332   PROBNP  --   --   --   --   --   --  25,781.0*   TROPONIN T 0.132*  --  0.151* 0.129* 0.107* 0.129* 0.139*   PROTIME  --  17.9*  --   --   --   --   --    INR  --  1.48*  --   --   --   --   --                  Brief Urine Lab Results  (Last result in the past 365 days)      Color   Clarity   Blood   Leuk Est   Nitrite   Protein   CREAT   Urine  HCG        08/23/22 1309             59.2         08/23/22 1309 [place]   Cloudy   Large (3+)   Trace   Negative   30 mg/dL (1+)                 Microbiology Results Abnormal     Procedure Component Value - Date/Time    Blood Culture - Blood, Arm, Left [[id number]]  (Normal) Collected: 10/02/22 0433    Lab Status: Final result Specimen: Blood from Arm, Left Updated: 10/07/22 0647     Blood Culture No growth at 5 days    Blood Culture - Blood, Arm, Left [[id number]]  (Normal) Collected: 10/02/22 0433    Lab Status: Final result Specimen: Blood from Arm, Left Updated: 10/07/22 0647     Blood Culture No growth at 5 days          No radiology results from the last 24 hrs    Results for orders placed during the hospital encounter of 07/27/22    Adult Transthoracic Echo Complete W/ Cont if Necessary Per Protocol    Interpretation Summary  · Left ventricular ejection fraction appears to be 46 - 50%. Left ventricular systolic function is low normal.  · Left ventricular septal hypokinesis  · No significant valvular heart disease      I have reviewed the medications:  Scheduled Meds:apixaban, 5 mg, Oral, Q12H  aspirin, 81 mg, Oral, Every Other Day  atorvastatin, 20 mg, Oral, Nightly  bethanechol, 10 mg, Oral, TID  bumetanide, 1 mg, Intravenous, Daily  carvedilol, 3.125 mg, Oral, BID With Meals  doxycycline, 100 mg, Oral, Q12H  famotidine, 20 mg, Oral, Daily  insulin detemir, 10 Units, Subcutaneous, Nightly  insulin lispro, 0-9 Units, Subcutaneous, TID AC  lactobacillus acidophilus, 1 capsule, Oral, Daily  levothyroxine, 75 mcg, Oral, Q AM  pharmacy consult - MTM, , Does not apply, Daily  senna-docusate sodium, 2 tablet, Oral, BID  sertraline, 25 mg, Oral, Nightly  sodium chloride, 10 mL, Intravenous, Q12H  sodium chloride, 10 mL, Intravenous, Q12H  tamsulosin, 0.4 mg, Oral, Daily      Continuous Infusions:   PRN Meds:.•  acetaminophen **OR** acetaminophen **OR** acetaminophen  •  senna-docusate sodium **AND** polyethylene glycol  **AND** bisacodyl **AND** bisacodyl  •  dextrose  •  dextrose  •  Diclofenac Sodium  •  glucagon (human recombinant)  •  HYDROcodone-acetaminophen  •  influenza vaccine  •  ipratropium-albuterol  •  magnesium sulfate **OR** magnesium sulfate in D5W 1g/100mL (PREMIX)  •  nitroglycerin  •  ondansetron **OR** ondansetron  •  sodium chloride  •  sodium chloride  •  sodium chloride  •  sodium chloride    Assessment & Plan   Assessment & Plan     Active Hospital Problems    Diagnosis  POA   • Elevated troponin [R77.8]  Yes   • Cellulitis of right lower extremity [L03.115]  Yes   • [place] (acute kidney injury) ([facility]) [N17.9]  Yes   • Acute on chronic HFrEF (heart failure with reduced ejection fraction) ([facility]) [I50.23]  Yes   • SSS (sick sinus syndrome) ([facility]) [I49.5]  Yes   • Type 2 diabetes mellitus ([facility]) [E11.9]  Yes   • Hypertension [I10]  Yes   • Hyperlipidemia [E78.5]  Yes   • Hypothyroidism (acquired) [E03.9]  Yes   • S/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 Au [Z95.1]  Not Applicable      Resolved Hospital Problems    Diagnosis Date Resolved POA   • Angina at rest ([facility]) [I20.8] 10/02/2022 Yes     Brief Hospital Course to date:  Jermaine Singh is a 76 y/o male PMH DM2, CAD s/p prior CABG, PVD, s/p R LE angioplasty 7/2021, recent left great toe amputation per Dr. Márquez on 8/2022 presenting to the ED with acute onset SOA/chest heaviness.  Patient was found hypoxic with acute on chronic HFmrEF, right pleural effusion, elevated troponin.  Cardiology consulted feeling elevated troponin likely due to CHF in the setting of renal failure.  Patient denies chest pain.  Troponin trended downward.  Lexiscan stress test 10/3 revealed anterior septal scar with no significant ischemia.  LVEF is 42%.  Patient received diuresis with Bumex 1 mg IV daily.  Patient resumed on Eliquis on 10/6.  Patient also noted with RLE cellulitis.  ID consulted as well as Dr. Márquez as he also had a recent left toe amputation on 8/2022.  Patient received Zyvox,  Zosyn, transition to doxycycline for 1 month.  Patient experienced scrotal edema that caused some urinary retention that is improving.  [place] suspected likely due to Bactrim use.    This patient's problems and plans were partially entered by my partner and updated as appropriate by me 10/08/22.     Hypoxia  A/C HFmrEF  Right pleural effusion  Elevated troponin  --Patient requests cardiology consult, follows with Dr. Apodaca, they are following  --Elevated troponin is likely due to CHF in setting of his renal failure. No CP at this time and his troponin trended down  -- D/c heparin per Dr. Apodaca  -- Lexiscan stress test 10/3- reveals anteroseptal scar with no significant ischemia.  LVEF 42%  -- continue Bumex 1mg IV daily-- hopeful for transition to PO option soon per cards   -- pacemaker interrogated this admission  -- resumed Eliquis 10/6  -- continue atorvastatin and carvedilol     Urinary retention  -- likely secondary to severe scrotal swelling d/t above  -- monitor with scrotal support  -- patient refused catheter ordered yesterday, will monitor      RLE cellulitis  Recent Left toe amputation  --Previously on bactrim w/out improvement.   -- Dr Márquez following as well as now ID, Dr Braga- have d/w both of them today  -- ultimately patient may need amputation of left great toe- have d/w Dr Márquez. For now, patient is able to bear weight using off-loading boot and can work with PT/OT  -- have d/w Dr Braga, hopeful for transition to PO abx therapy soon and ultimately hopeful for discharge soon. Last PT/OT rec from 10/4 was home with HH     [place]  --Suspect mostly due to Bactrim use. Stopped Bactrim  -- improving and 1.7 this AM- continues to improve despite IV diuresis. Watch closely      DM2 w/ hyperglycemia  --Basal bolus insulin w/ SSI.    Depression/Anxiety  - start SSRI zoloft (initiated this admission)    Expected Discharge Location and Transportation: Spoke with family today.  Patient and spouse requesting  discharge to [facility] for rehab.  Patient got up for the first time yesterday and was extremely weak. Contacted CM to inform them of the patient's wishes  Expected Discharge Date: 10/12/2022    DVT prophylaxis:  Medical DVT prophylaxis orders are present.     AM-PAC 6 Clicks Score (PT): 19 (10/08/22 0800)    CODE STATUS:   Code Status and Medical Interventions:   Ordered at: 10/02/22 0949     Code Status (Patient has no pulse and is not breathing):    CPR (Attempt to Resuscitate)     Medical Interventions (Patient has pulse or is breathing):    Full Support       Shabnam Lagos, APRN  10/08/22

## 2022-10-08 NOTE — PROGRESS NOTES
Cardiothoracic Surgery Progress Note      POD #: Left great toe amputation August 2, 2022     LOS: 6 days      Subjective: Awake and alert    Objective:  Vital Signs vital signs below noted T-max past 24 hours 90 °F  Temp:  [97.4 °F (36.3 °C)-98 °F (36.7 °C)] 97.8 °F (36.6 °C)  Heart Rate:  [65-72] 67  Resp:  [18] 18  BP: (114-138)/(75-83) 124/83    Physical Exam:   General Appearance: Oriented x3   Lungs:   Heart:   Skin: Open ulceration left second toe middle phalangeal joint space with exposed joint/osseous structures.   Incision: Amputation incision overall healing well with some superficial dehiscence at the most distal portion of the surgical incision     Results:  Results from last 7 days   Lab Units 10/08/22  0655   WBC 10*3/mm3 5.33   HEMOGLOBIN g/dL 11.1*   HEMATOCRIT % 34.4*   PLATELETS 10*3/mm3 169     Results from last 7 days   Lab Units 10/07/22  0638   SODIUM mmol/L 136   POTASSIUM mmol/L 4.9   CHLORIDE mmol/L 101   CO2 mmol/L 23.0   BUN mg/dL 26*   CREATININE mg/dL 1.70*   GLUCOSE mg/dL 147*   CALCIUM mg/dL 9.0         Assessment: #1.  Postop left great toe transmetatarsal amputation August 2, 2022 with primary closure with some minimal dehiscence most distal portion of incision  2.  Expose middle phalangeal bone/middle phalangeal joint space left second toe from hammertoe phenomena  #3 ischemic cardiomyopathy and heart failure  4.  Status post remote coronary artery stenting    Plan: Had a discussion with Dr. Apodaca yesterday-I see no reason to proceed with urgent left second toe amputation although we need to follow him closely as an outpatient.  Patient will be going home in the very near future.  Overall antibiotic coverage per infectious disease      Gopal Márquez MD - 10/08/22 - 07:30 EDT

## 2022-10-08 NOTE — PLAN OF CARE
Goal Outcome Evaluation:  Plan of Care Reviewed With: patient        Progress: improving  Outcome Evaluation: VSS on room air, no c/o pain through the shift. pt appears to be sleeping at this time.

## 2022-10-08 NOTE — PROGRESS NOTES
INFECTIOUS DISEASE follow up     Jermaine Singh  1945  [id number]      Admission Date: 10/2/2022      Requesting Provider: No ref. provider found  Evaluating Physician: DAVID Rushing    Reason for Consultation: Shortness of breath    History of present illness:    Patient is a 77 y.o. 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  Course complicated by acute renal failure.  Patient presents to hospital with shortness of breath and a sensation of smothering.  Patient also reports increasing redness of right lower extremity.  Patient recently treated with oral Bactrim.  Patient started on IV Rocephin we being consulted for further antibiotic management.    Patient denies fevers chills chest pain nausea vomiting or diarrhea    Patient admitted hospital on 10/2/2022, started on iv abx for right leg cellulitis now currently on zyvox, zosyn. we are being consulted on hospital day  5. Left second toe with ulceration.    10/8/22:  Complains of scrotal swelling.  No difficulty with urination.  No fever, chills, sweats..  Right leg cellulitis present x 2-3 week is better per wife. She hopes he is only discharged tomorrow.      Past Medical History:   Diagnosis Date   • Asthma    • Coronary artery disease    • Diabetes mellitus (HCC) 2000    started on inuslin 12/2018; started on po meds in 2000; checking blood sugars daily    • Disease of thyroid gland     po meds daily for hypothyroidism    • History of fracture as a child     rt leg- severe    • Hyperlipidemia    • Hypertension    • Hypothyroidism    • Peripheral neuropathy    • Peripheral vascular disease (HCC)     s/p angiogram 2/19-needs stent in left leg    • RBBB    • Right knee pain    • Vitamin D deficiency        Past Surgical History:   Procedure Laterality Date   • APPENDECTOMY     • CARDIAC CATHETERIZATION N/A 2/14/2019    Procedure: Left Heart Cath;  Surgeon: Cooper Apodaca MD;   Location:  [facility];  Service: Cardiology   • CARDIAC ELECTROPHYSIOLOGY PROCEDURE N/A 5/18/2022    Procedure: DEVICE IMPLANT;  Surgeon: Cooper Apodaca MD;  Location:  [facility];  Service: Cardiology;  Laterality: N/A;   • COLONOSCOPY     • CORONARY ARTERY BYPASS GRAFT N/A 3/22/2019    Procedure: MEDIAN STERNOTOMY, CORONARY ARTERY BYPASS GRAFT X3, UTILIZING THE LEFT INTERNAL MAMMARY ARTERY, EVH AND OPEN HARVEST OF THE RIGHT GREATER SAPHENOUS VEIN, EXPLORATION OF THE LEFT LEG;  Surgeon: Ap Au MD;  Location:  [facility];  Service: Cardiothoracic   • EYE SURGERY Bilateral     cataracts    • INTERVENTIONAL RADIOLOGY PROCEDURE N/A 7/29/2021    Procedure: Abdominal Aortagram with Runoff;  Surgeon: Jermaine Hernandez MD;  Location:  [facility];  Service: Cardiovascular;  Laterality: N/A;   • KNEE ARTHROSCOPY      right x 2, left x 1   • LACERATION REPAIR      right leg   • LEG SURGERY      2 for fracture of rt leg    • TONSILLECTOMY      Adnoidectomy   • TRANS METATARSAL AMPUTATION Left 8/2/2022    Procedure: GREAT TOE AMPUTATION LEFT;  Surgeon: Gopal Márquez MD;  Location:  [facility];  Service: Vascular;  Laterality: Left;       Family History   Problem Relation Age of Onset   • Coronary artery disease Mother    • Diabetes Mother    • Cancer Father        Social History     Socioeconomic History   • Marital status:    • Number of children: 2   Tobacco Use   • Smoking status: Never   • Smokeless tobacco: Never   Vaping Use   • Vaping Use: Never used   Substance and Sexual Activity   • Alcohol use: No   • Drug use: No   • Sexual activity: Defer       Allergies   Allergen Reactions   • Vancomycin Other (See Comments)     Kidney failure per last admission         Medication:    Current Facility-Administered Medications:   •  acetaminophen (TYLENOL) tablet 650 mg, 650 mg, Oral, Q4H PRN, 650 mg at 10/07/22 0124 **OR** acetaminophen (TYLENOL) 160  MG/5ML solution 650 mg, 650 mg, Oral, Q4H PRN **OR** acetaminophen (TYLENOL) suppository 650 mg, 650 mg, Rectal, Q4H PRN, Renetta Gatica M II, DO  •  apixaban (ELIQUIS) tablet 5 mg, 5 mg, Oral, Q12H, Jacinta Daniel, DO, 5 mg at 10/08/22 0859  •  aspirin chewable tablet 81 mg, 81 mg, Oral, Every Other Day, Jenna Campbell MD, 81 mg at 10/08/22 0859  •  atorvastatin (LIPITOR) tablet 20 mg, 20 mg, Oral, Nightly, Sarah Pineda APRN, 20 mg at 10/07/22 2030  •  bethanechol (URECHOLINE) tablet 10 mg, 10 mg, Oral, TID, Renetta Gatica M II, DO, 10 mg at 10/08/22 0859  •  sennosides-docusate (PERICOLACE) 8.6-50 MG per tablet 2 tablet, 2 tablet, Oral, BID, 2 tablet at 10/06/22 0833 **AND** polyethylene glycol (MIRALAX) packet 17 g, 17 g, Oral, Daily PRN **AND** bisacodyl (DULCOLAX) EC tablet 5 mg, 5 mg, Oral, Daily PRN **AND** bisacodyl (DULCOLAX) suppository 10 mg, 10 mg, Rectal, Daily PRN, Renetta Gatica M II, DO  •  bumetanide (BUMEX) injection 1 mg, 1 mg, Intravenous, Daily, Jacinta Daniel DO, 1 mg at 10/08/22 0900  •  carvedilol (COREG) tablet 3.125 mg, 3.125 mg, Oral, BID With Meals, Renetta Gatica M II, DO, 3.125 mg at 10/08/22 0859  •  dextrose (D50W) (25 g/50 mL) IV injection 25 g, 25 g, Intravenous, Q15 Min PRN, EnRenetta russo M II, DO  •  dextrose (GLUTOSE) oral gel 15 g, 15 g, Oral, Q15 Min PRN, Renetta Gatica M II, DO  •  Diclofenac Sodium (VOLTAREN) 1 % gel 2 g, 2 g, Topical, 4x Daily PRN, Renetta Gatica II, DO  •  doxycycline (MONODOX) capsule 100 mg, 100 mg, Oral, Q12H, Jenna Campbell MD, 100 mg at 10/08/22 0859  •  famotidine (PEPCID) tablet 20 mg, 20 mg, Oral, Daily, Renetta Gatica II, , 20 mg at 10/08/22 0859  •  glucagon (human recombinant) (GLUCAGEN DIAGNOSTIC) injection 1 mg, 1 mg, Intramuscular, Q15 Min PRN, Renetta Gatica II, DO  •  HYDROcodone-acetaminophen (NORCO) 5-325 MG per tablet 1 tablet, 1 tablet, Oral, Q4H PRN, Renetta Gatica II, DO, 1 tablet at 10/04/22 0137  •  Influenza Vac  High-Dose Quad (FLUZONE HIGH DOSE) injection 0.7 mL, 0.7 mL, Intramuscular, During Hospitalization, Renetta Gatica II, DO  •  insulin detemir (LEVEMIR) injection 10 Units, 10 Units, Subcutaneous, Nightly, Renetta Gatica II, DO, 10 Units at 10/07/22 2030  •  Insulin Lispro (humaLOG) injection 0-9 Units, 0-9 Units, Subcutaneous, TID AC, Renetta Gatica II, DO, 2 Units at 10/07/22 1703  •  ipratropium-albuterol (DUO-NEB) nebulizer solution 3 mL, 3 mL, Nebulization, Q4H PRN, Renetta Gatica II, DO  •  lactobacillus acidophilus (RISAQUAD) capsule 1 capsule, 1 capsule, Oral, Daily, Renetta Gatica II, DO, 1 capsule at 10/08/22 0859  •  levothyroxine (SYNTHROID, LEVOTHROID) tablet 75 mcg, 75 mcg, Oral, Q AM, Renetta Gatica II, DO, 75 mcg at 10/08/22 0530  •  Magnesium Sulfate 2 gram infusion - Mg less than or equal to 1.5 mg/dL, 2 g, Intravenous, PRN **OR** Magnesium Sulfate 1 gram infusion - Mg 1.6-1.9 mg/dL, 1 g, Intravenous, PRN, Sarah Pineda, APRN  •  nitroglycerin (NITROSTAT) SL tablet 0.4 mg, 0.4 mg, Sublingual, Q5 Min PRN, Sarah Pineda, APRN  •  ondansetron (ZOFRAN) tablet 4 mg, 4 mg, Oral, Q6H PRN **OR** ondansetron (ZOFRAN) injection 4 mg, 4 mg, Intravenous, Q6H PRN, Renetta Gatica II, DO, 4 mg at 10/02/22 1509  •  Pharmacy Consult - [facility], , Does not apply, Daily, Mya Han, PharmD  •  sertraline (ZOLOFT) tablet 25 mg, 25 mg, Oral, Nightly, Jenna Campbell MD, 25 mg at 10/07/22 2030  •  sodium chloride 0.9 % flush 10 mL, 10 mL, Intravenous, PRN, Sarah Pineda, APRN  •  sodium chloride 0.9 % flush 10 mL, 10 mL, Intravenous, Q12H, Sarah Pineda, APRN, 10 mL at 10/08/22 0900  •  sodium chloride 0.9 % flush 10 mL, 10 mL, Intravenous, PRN, Sarah Pineda, APRN  •  sodium chloride 0.9 % flush 10 mL, 10 mL, Intravenous, Q12H, Renetta Gatica II, DO, 10 mL at 10/07/22 2031  •  sodium chloride 0.9 % flush 10 mL, 10 mL, Intravenous, PRN, Renetta Gatica II, DO  •  sodium chloride nasal spray 1  spray, 1 spray, Nasal, PRN, Renetta Gatica II, DO  •  tamsulosin (FLOMAX) 24 hr capsule 0.4 mg, 0.4 mg, Oral, Daily, Renetta Gatica II, DO, 0.4 mg at 10/08/22 0859    Facility-Administered Medications Ordered in Other Encounters:   •  Chlorhexidine Gluconate Cloth 2 % pads 1 application, 1 application, Topical, Q12H PRN, Mohan Arauz, PA    Antibiotics:  Anti-Infectives (From admission, onward)    Ordered     Dose/Rate Route Frequency Start Stop    10/06/22 1130  doxycycline (MONODOX) capsule 100 mg        Ordering Provider: Jenna Campbell MD    100 mg Oral Every 12 Hours Scheduled 10/06/22 2100 10/13/22 2059    10/02/22 0948  piperacillin-tazobactam (ZOSYN) 3.375 g in iso-osmotic dextrose 50 ml (premix)        Ordering Provider: Renetta Gatica II DO    3.375 g  over 4 Hours Intravenous Every 8 Hours 10/02/22 1800 10/07/22 1307    10/02/22 0948  piperacillin-tazobactam (ZOSYN) 3.375 g in iso-osmotic dextrose 50 ml (premix)        Ordering Provider: Renetta Gatica II, DO    3.375 g  over 30 Minutes Intravenous Once 10/02/22 1100 10/02/22 1231    10/02/22 0348  cefTRIAXone (ROCEPHIN) 2 g/100 mL 0.9% NS IVPB (MBP)        Ordering Provider: Salinas Dunn MD    2 g  over 30 Minutes Intravenous Once 10/02/22 0350 10/02/22 0527            Physical Exam:   Vital Signs  Temp (24hrs), Av.8 °F (36.6 °C), Min:97.4 °F (36.3 °C), Max:98 °F (36.7 °C)    Temp  Min: 97.4 °F (36.3 °C)  Max: 98 °F (36.7 °C)  BP  Min: 114/75  Max: 138/80  Pulse  Min: 63  Max: 72  Resp  Min: 18  Max: 18  SpO2  Min: 92 %  Max: 95 %    GENERAL: Awake and alert, in no acute distress.   HEENT: Normocephalic, atraumatic.  PERRL. EOMI. No conjunctival injection. No icterus. Oropharynx clear without evidence of thrush or exudate. No evidence of peridontal disease.      HEART: RRR; No murmur,    LUNGS: Clear to auscultation bilaterally   ABDOMEN: Soft, nontender,  EXT:  1+ edema, right leg erythema scaly skin left 2nd toe with  ulceration of pip joint- dressed this am./   :  Without Fonseca catheter- + scrotal edema   MSK: No joint deformity  SKIN: Warm and dry without cutaneous eruptions on Inspection/palpation.    NEURO: Oriented to PPT.  PSYCHIATRIC: Normal insight and judgement. Cooperative with PE    Laboratory Data    Results from last 7 days   Lab Units 10/08/22  0655 10/06/22  0716 10/05/22  0844   WBC 10*3/mm3 5.33 4.94 5.16   HEMOGLOBIN g/dL 11.1* 10.8* 11.0*   HEMATOCRIT % 34.4* 34.3* 35.9*   PLATELETS 10*3/mm3 169 152 154     Results from last 7 days   Lab Units 10/08/22  0655   SODIUM mmol/L 137   POTASSIUM mmol/L 4.4   CHLORIDE mmol/L 102   CO2 mmol/L 25.0   BUN mg/dL 24*   CREATININE mg/dL 1.77*   GLUCOSE mg/dL 141*   CALCIUM mg/dL 8.8     Results from last 7 days   Lab Units 10/08/22  0655   ALK PHOS U/L 62   BILIRUBIN mg/dL 0.2   ALT (SGPT) U/L 13   AST (SGOT) U/L 11     Results from last 7 days   Lab Units 10/02/22  0332   SED RATE mm/hr 6     Results from last 7 days   Lab Units 10/02/22  0332   CRP mg/dL 0.40     Results from last 7 days   Lab Units 10/02/22  0332   LACTATE mmol/L 1.9             Estimated Creatinine Clearance: 47.8 mL/min (A) (by C-G formula based on SCr of 1.77 mg/dL (H)).      Microbiology:  Blood Culture   Date Value Ref Range Status   10/02/2022 No growth at 5 days  Final   10/02/2022 No growth at 5 days  Final     No results found for: BCIDPCR, CXREFLEX, CSFCX, CULTURETIS  No results found for: CULTURES, HSVCX, URCX  No results found for: EYECULTURE, GCCX, HSVCULTURE, LABHSV  No results found for: LEGIONELLA, MRSACX, MUMPSCX, MYCOPLASCX  No results found for: NOCARDIACX, STOOLCX  No results found for: THROATCX, UNSTIMCULT, URINECX, CULTURE, VZVCULTUR  No results found for: VIRALCULTU, WOUNDCX        Radiology:  Imaging Results (Last 72 Hours)     ** No results found for the last 72 hours. **            Impression:   Left great toe amputation 8/2/2022  Ischemic cardiomyopathy  Right leg  cellulitis  Vancomycin allergy  pacemaker    PLAN/RECOMMENDATIONS:      Favor streptococcal process of right leg. This appears to be improving      Continue  oral doxycyline 100 mg po bid x 1 month    I have explained to family that given pacemaker, imaging of foot with bone scan, wbc is problematic;  Regardless of what imaging says I would have a low threshold for surgical amputation in hopes of reducing risk for seeding endovascular hardware.    Left second toe with ulceration, no severe swelling, no malodorous drainage; Patient could be monitored as an outpatient and can bear weight on left leg;   Would consider outpatient f/u and toe amputation if worsening.      F/u with Dr. Braga in 1 month       has obtained the history, performed the physical exam and formulated the above treatment plan.     DAVID Rushing  10/8/2022  10:42 EDT

## 2022-10-08 NOTE — PLAN OF CARE
Goal Outcome Evaluation:  Plan of Care Reviewed With: patient         VSS, RA, SR. Up with assist of walker and a isaiah belt and 2 people. Seen by  today per patient request as he said he was becoming very irritable. Will continue current plan of care.

## 2022-10-09 LAB
ANION GAP SERPL CALCULATED.3IONS-SCNC: 10 MMOL/L (ref 5–15)
BUN SERPL-MCNC: 24 MG/DL (ref 8–23)
BUN/CREAT SERPL: 14.9 (ref 7–25)
CALCIUM SPEC-SCNC: 8.8 MG/DL (ref 8.6–10.5)
CHLORIDE SERPL-SCNC: 103 MMOL/L (ref 98–107)
CO2 SERPL-SCNC: 25 MMOL/L (ref 22–29)
CREAT SERPL-MCNC: 1.61 MG/DL (ref 0.76–1.27)
EGFRCR SERPLBLD CKD-EPI 2021: 43.8 ML/MIN/1.73
GLUCOSE BLDC GLUCOMTR-MCNC: 106 MG/DL (ref 70–130)
GLUCOSE BLDC GLUCOMTR-MCNC: 138 MG/DL (ref 70–130)
GLUCOSE BLDC GLUCOMTR-MCNC: 142 MG/DL (ref 70–130)
GLUCOSE BLDC GLUCOMTR-MCNC: 180 MG/DL (ref 70–130)
GLUCOSE SERPL-MCNC: 140 MG/DL (ref 65–99)
POTASSIUM SERPL-SCNC: 4.4 MMOL/L (ref 3.5–5.2)
SODIUM SERPL-SCNC: 138 MMOL/L (ref 136–145)

## 2022-10-09 PROCEDURE — 63710000001 INSULIN DETEMIR PER 5 UNITS: Performed by: PHYSICIAN ASSISTANT

## 2022-10-09 PROCEDURE — 82962 GLUCOSE BLOOD TEST: CPT

## 2022-10-09 PROCEDURE — 80048 BASIC METABOLIC PNL TOTAL CA: CPT | Performed by: PHYSICIAN ASSISTANT

## 2022-10-09 PROCEDURE — 99024 POSTOP FOLLOW-UP VISIT: CPT | Performed by: THORACIC SURGERY (CARDIOTHORACIC VASCULAR SURGERY)

## 2022-10-09 PROCEDURE — 97530 THERAPEUTIC ACTIVITIES: CPT

## 2022-10-09 PROCEDURE — 99232 SBSQ HOSP IP/OBS MODERATE 35: CPT | Performed by: PHYSICIAN ASSISTANT

## 2022-10-09 RX ORDER — INSULIN LISPRO 100 [IU]/ML
3 INJECTION, SOLUTION INTRAVENOUS; SUBCUTANEOUS
Status: DISCONTINUED | OUTPATIENT
Start: 2022-10-09 | End: 2022-10-10

## 2022-10-09 RX ORDER — AMOXICILLIN 250 MG
2 CAPSULE ORAL 2 TIMES DAILY PRN
Status: DISCONTINUED | OUTPATIENT
Start: 2022-10-09 | End: 2022-10-11 | Stop reason: HOSPADM

## 2022-10-09 RX ORDER — POLYETHYLENE GLYCOL 3350 17 G/17G
17 POWDER, FOR SOLUTION ORAL DAILY PRN
Status: DISCONTINUED | OUTPATIENT
Start: 2022-10-09 | End: 2022-10-11 | Stop reason: HOSPADM

## 2022-10-09 RX ORDER — SACCHAROMYCES BOULARDII 250 MG
250 CAPSULE ORAL 2 TIMES DAILY
Status: DISCONTINUED | OUTPATIENT
Start: 2022-10-09 | End: 2022-10-11 | Stop reason: HOSPADM

## 2022-10-09 RX ORDER — OXYMETAZOLINE HYDROCHLORIDE 0.05 G/100ML
3 SPRAY NASAL ONCE
Status: COMPLETED | OUTPATIENT
Start: 2022-10-09 | End: 2022-10-09

## 2022-10-09 RX ORDER — BISACODYL 5 MG/1
5 TABLET, DELAYED RELEASE ORAL DAILY PRN
Status: DISCONTINUED | OUTPATIENT
Start: 2022-10-09 | End: 2022-10-11 | Stop reason: HOSPADM

## 2022-10-09 RX ORDER — BISACODYL 10 MG
10 SUPPOSITORY, RECTAL RECTAL DAILY PRN
Status: DISCONTINUED | OUTPATIENT
Start: 2022-10-09 | End: 2022-10-11 | Stop reason: HOSPADM

## 2022-10-09 RX ADMIN — Medication 250 MG: at 20:10

## 2022-10-09 RX ADMIN — BETHANECHOL CHLORIDE 10 MG: 10 TABLET ORAL at 20:10

## 2022-10-09 RX ADMIN — CARVEDILOL 3.12 MG: 3.12 TABLET, FILM COATED ORAL at 18:22

## 2022-10-09 RX ADMIN — APIXABAN 5 MG: 5 TABLET, FILM COATED ORAL at 08:00

## 2022-10-09 RX ADMIN — Medication 250 MG: at 18:22

## 2022-10-09 RX ADMIN — DOXYCYCLINE 100 MG: 100 CAPSULE ORAL at 08:00

## 2022-10-09 RX ADMIN — BETHANECHOL CHLORIDE 10 MG: 10 TABLET ORAL at 08:00

## 2022-10-09 RX ADMIN — BETHANECHOL CHLORIDE 10 MG: 10 TABLET ORAL at 18:22

## 2022-10-09 RX ADMIN — DOXYCYCLINE 100 MG: 100 CAPSULE ORAL at 20:10

## 2022-10-09 RX ADMIN — ATORVASTATIN CALCIUM 20 MG: 20 TABLET, FILM COATED ORAL at 20:10

## 2022-10-09 RX ADMIN — TAMSULOSIN HYDROCHLORIDE 0.4 MG: 0.4 CAPSULE ORAL at 08:00

## 2022-10-09 RX ADMIN — FAMOTIDINE 20 MG: 20 TABLET ORAL at 08:00

## 2022-10-09 RX ADMIN — CARVEDILOL 3.12 MG: 3.12 TABLET, FILM COATED ORAL at 08:00

## 2022-10-09 RX ADMIN — BUMETANIDE 1 MG: 1 TABLET ORAL at 08:00

## 2022-10-09 RX ADMIN — Medication 10 ML: at 20:11

## 2022-10-09 RX ADMIN — Medication 1 CAPSULE: at 08:00

## 2022-10-09 RX ADMIN — APIXABAN 5 MG: 5 TABLET, FILM COATED ORAL at 20:10

## 2022-10-09 RX ADMIN — INSULIN DETEMIR 8 UNITS: 100 INJECTION, SOLUTION SUBCUTANEOUS at 20:11

## 2022-10-09 RX ADMIN — Medication 10 ML: at 20:10

## 2022-10-09 RX ADMIN — OXYMETAZOLINE HCL 3 SPRAY: 0.05 SPRAY NASAL at 13:43

## 2022-10-09 RX ADMIN — LEVOTHYROXINE SODIUM 75 MCG: 0.07 TABLET ORAL at 05:15

## 2022-10-09 NOTE — PROGRESS NOTES
Cardiothoracic Surgery Progress Note      POD #: Left great toe amputation August 2, 2022     LOS: 7 days      Subjective: Awake and alert    Objective:  Vital Signs vital signs below noted T-max past 24 hours 90.3 °F  Temp:  [97.3 °F (36.3 °C)-98.3 °F (36.8 °C)] 97.6 °F (36.4 °C)  Heart Rate:  [61-76] 63  Resp:  [18] 18  BP: (114-138)/(70-82) 114/78    Physical Exam:   General Appearance: Oriented x3   Lungs:   Heart:   Skin: Exposed middle phalangeal joint of the left second toe unchanged.  Betadine swabbing to the ulcerative area   Incision: Left great toe amputation site overall healed well with some dehiscence superficially at the distal portion Betadine swabbing and covered with a 6 x 6 Optifoam along with the left second toe.     Results:  Results from last 7 days   Lab Units 10/08/22  0655   WBC 10*3/mm3 5.33   HEMOGLOBIN g/dL 11.1*   HEMATOCRIT % 34.4*   PLATELETS 10*3/mm3 169     Results from last 7 days   Lab Units 10/08/22  0655   SODIUM mmol/L 137   POTASSIUM mmol/L 4.4   CHLORIDE mmol/L 102   CO2 mmol/L 25.0   BUN mg/dL 24*   CREATININE mg/dL 1.77*   GLUCOSE mg/dL 141*   CALCIUM mg/dL 8.8         Assessment: #1.  Postop left great toe transmetatarsal amputation August 2, 2022 overall healing well with some dehiscence superficially distal portion  2.  Expose middle phalangeal joint space left second toe from hammertoe phenomena/ulceration.  Stable at this time.  Will eventually need amputation  3.  Ischemic cardiomyopathy and heart failure  4.  Status post remote coronary stenting      Plan: Continue daily dressing changes left great toe amputation site left second toe ulcerative middle phalangeal joint space.  Overall medical manage hospitalist.  Antibiotics per infectious disease.      Gopal Márquez MD - 10/09/22 - 06:17 EDT

## 2022-10-09 NOTE — PROGRESS NOTES
INFECTIOUS DISEASE follow up     Jermaine Singh  1945  [id number]      Admission Date: 10/2/2022      Requesting Provider: No ref. provider found  Evaluating Physician: DAVID Rushing    Reason for Consultation: Shortness of breath    History of present illness:    Patient is a 77 y.o. 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  Course complicated by acute renal failure.  Patient presents to hospital with shortness of breath and a sensation of smothering.  Patient also reports increasing redness of right lower extremity.  Patient recently treated with oral Bactrim.  Patient started on IV Rocephin we being consulted for further antibiotic management.    Patient denies fevers chills chest pain nausea vomiting or diarrhea    Patient admitted hospital on 10/2/2022, started on iv abx for right leg cellulitis now currently on zyvox, zosyn. we are being consulted on hospital day  5. Left second toe with ulceration.    10/8/22:  Complains of scrotal swelling.  No difficulty with urination.  No fever, chills, sweats..  Right leg cellulitis present x 2-3 week is better per wife. She hopes he is only discharged tomorrow.      10/9/22:  Remains afebrile. Hoping to go home today.  No n/v/d.  Family wants him to go to [facility] and referral has been made.     Past Medical History:   Diagnosis Date   • Asthma    • Coronary artery disease    • Diabetes mellitus (HCC) 2000    started on inuslin 12/2018; started on po meds in 2000; checking blood sugars daily    • Disease of thyroid gland     po meds daily for hypothyroidism    • History of fracture as a child     rt leg- severe    • Hyperlipidemia    • Hypertension    • Hypothyroidism    • Peripheral neuropathy    • Peripheral vascular disease (HCC)     s/p angiogram 2/19-needs stent in left leg    • RBBB    • Right knee pain    • Vitamin D deficiency        Past Surgical History:   Procedure Laterality  Date   • APPENDECTOMY     • CARDIAC CATHETERIZATION N/A 2/14/2019    Procedure: Left Heart Cath;  Surgeon: Cooper Apodaca MD;  Location:  [facility];  Service: Cardiology   • CARDIAC ELECTROPHYSIOLOGY PROCEDURE N/A 5/18/2022    Procedure: DEVICE IMPLANT;  Surgeon: Cooper Apodaca MD;  Location:  [facility];  Service: Cardiology;  Laterality: N/A;   • COLONOSCOPY     • CORONARY ARTERY BYPASS GRAFT N/A 3/22/2019    Procedure: MEDIAN STERNOTOMY, CORONARY ARTERY BYPASS GRAFT X3, UTILIZING THE LEFT INTERNAL MAMMARY ARTERY, EVH AND OPEN HARVEST OF THE RIGHT GREATER SAPHENOUS VEIN, EXPLORATION OF THE LEFT LEG;  Surgeon: Ap Au MD;  Location:  [facility];  Service: Cardiothoracic   • EYE SURGERY Bilateral     cataracts    • INTERVENTIONAL RADIOLOGY PROCEDURE N/A 7/29/2021    Procedure: Abdominal Aortagram with Runoff;  Surgeon: Jermaine Hernandez MD;  Location:  [facility];  Service: Cardiovascular;  Laterality: N/A;   • KNEE ARTHROSCOPY      right x 2, left x 1   • LACERATION REPAIR      right leg   • LEG SURGERY      2 for fracture of rt leg    • TONSILLECTOMY      Adnoidectomy   • TRANS METATARSAL AMPUTATION Left 8/2/2022    Procedure: GREAT TOE AMPUTATION LEFT;  Surgeon: Gopal Márquez MD;  Location:  [facility];  Service: Vascular;  Laterality: Left;       Family History   Problem Relation Age of Onset   • Coronary artery disease Mother    • Diabetes Mother    • Cancer Father        Social History     Socioeconomic History   • Marital status:    • Number of children: 2   Tobacco Use   • Smoking status: Never   • Smokeless tobacco: Never   Vaping Use   • Vaping Use: Never used   Substance and Sexual Activity   • Alcohol use: No   • Drug use: No   • Sexual activity: Defer       Allergies   Allergen Reactions   • Vancomycin Other (See Comments)     Kidney failure per last admission         Medication:    Current Facility-Administered  Medications:   •  acetaminophen (TYLENOL) tablet 650 mg, 650 mg, Oral, Q4H PRN, 650 mg at 10/07/22 0124 **OR** acetaminophen (TYLENOL) 160 MG/5ML solution 650 mg, 650 mg, Oral, Q4H PRN **OR** acetaminophen (TYLENOL) suppository 650 mg, 650 mg, Rectal, Q4H PRN, EnRenetta ursso M II, DO  •  apixaban (ELIQUIS) tablet 5 mg, 5 mg, Oral, Q12H, Jacinta Daniel, DO, 5 mg at 10/09/22 0800  •  aspirin chewable tablet 81 mg, 81 mg, Oral, Every Other Day, Jenna Campbell MD, 81 mg at 10/08/22 0859  •  atorvastatin (LIPITOR) tablet 20 mg, 20 mg, Oral, Nightly, Sarah Pineda APRN, 20 mg at 10/07/22 2030  •  bethanechol (URECHOLINE) tablet 10 mg, 10 mg, Oral, TID, Renetta Gatica M II, DO, 10 mg at 10/09/22 0800  •  sennosides-docusate (PERICOLACE) 8.6-50 MG per tablet 2 tablet, 2 tablet, Oral, BID, 2 tablet at 10/06/22 0833 **AND** polyethylene glycol (MIRALAX) packet 17 g, 17 g, Oral, Daily PRN **AND** bisacodyl (DULCOLAX) EC tablet 5 mg, 5 mg, Oral, Daily PRN **AND** bisacodyl (DULCOLAX) suppository 10 mg, 10 mg, Rectal, Daily PRN, Renetta Gatica M II, DO  •  bumetanide (BUMEX) tablet 1 mg, 1 mg, Oral, Daily, Shabnam Lagos APRN, 1 mg at 10/09/22 0800  •  carvedilol (COREG) tablet 3.125 mg, 3.125 mg, Oral, BID With Meals, Renetta Gatica M II, DO, 3.125 mg at 10/09/22 0800  •  dextrose (D50W) (25 g/50 mL) IV injection 25 g, 25 g, Intravenous, Q15 Min PRN, Renetta Gatica II, DO  •  dextrose (GLUTOSE) oral gel 15 g, 15 g, Oral, Q15 Min PRN, Renetta Gatica II, DO  •  Diclofenac Sodium (VOLTAREN) 1 % gel 2 g, 2 g, Topical, 4x Daily PRN, Renetta Gatica II, DO  •  doxycycline (MONODOX) capsule 100 mg, 100 mg, Oral, Q12H, Jenna Campbell MD, 100 mg at 10/09/22 0800  •  famotidine (PEPCID) tablet 20 mg, 20 mg, Oral, Daily, Renetta Gatica II, DO, 20 mg at 10/09/22 0800  •  glucagon (human recombinant) (GLUCAGEN DIAGNOSTIC) injection 1 mg, 1 mg, Intramuscular, Q15 Min PRN, Renetta Gatica II, DO  •  HYDROcodone-acetaminophen  (NORCO) 5-325 MG per tablet 1 tablet, 1 tablet, Oral, Q4H PRN, En Renetta M II, DO, 1 tablet at 10/04/22 0137  •  Influenza Vac High-Dose Quad (FLUZONE HIGH DOSE) injection 0.7 mL, 0.7 mL, Intramuscular, During Hospitalization, Aki Gaticacy M II, DO  •  insulin detemir (LEVEMIR) injection 8 Units, 8 Units, Subcutaneous, Nightly, Melissa Nguyen PA-C  •  Insulin Lispro (humaLOG) injection 0-9 Units, 0-9 Units, Subcutaneous, TID AC, Renetta Gatica M II, DO, 2 Units at 10/08/22 1152  •  Insulin Lispro (humaLOG) injection 3 Units, 3 Units, Subcutaneous, TID With Meals, Melissa Nguyen PA-C  •  ipratropium-albuterol (DUO-NEB) nebulizer solution 3 mL, 3 mL, Nebulization, Q4H PRN, Aki Gaticacy M II, DO  •  lactobacillus acidophilus (RISAQUAD) capsule 1 capsule, 1 capsule, Oral, Daily, Renetta Gatica M II, DO, 1 capsule at 10/09/22 0800  •  levothyroxine (SYNTHROID, LEVOTHROID) tablet 75 mcg, 75 mcg, Oral, Q AM, En Renetta M II, DO, 75 mcg at 10/09/22 0515  •  Magnesium Sulfate 2 gram infusion - Mg less than or equal to 1.5 mg/dL, 2 g, Intravenous, PRN **OR** Magnesium Sulfate 1 gram infusion - Mg 1.6-1.9 mg/dL, 1 g, Intravenous, PRN, Sarah Pineda, APRN  •  nitroglycerin (NITROSTAT) SL tablet 0.4 mg, 0.4 mg, Sublingual, Q5 Min PRN, Sarah Pineda, APRN  •  ondansetron (ZOFRAN) tablet 4 mg, 4 mg, Oral, Q6H PRN **OR** ondansetron (ZOFRAN) injection 4 mg, 4 mg, Intravenous, Q6H PRN, Renetta Gatica M II, DO, 4 mg at 10/02/22 1509  •  Pharmacy Consult - [facility], , Does not apply, Daily, Mya Han, PharmD  •  sertraline (ZOLOFT) tablet 25 mg, 25 mg, Oral, Nightly, Jenna Campbell MD, 25 mg at 10/07/22 2030  •  sodium chloride 0.9 % flush 10 mL, 10 mL, Intravenous, PRN, Sarah Pineda, APRN  •  sodium chloride 0.9 % flush 10 mL, 10 mL, Intravenous, Q12H, Sarah Pineda, APRN, 10 mL at 10/08/22 0900  •  sodium chloride 0.9 % flush 10 mL, 10 mL, Intravenous, PRN, Sarah Pineda, APRN  •  sodium  chloride 0.9 % flush 10 mL, 10 mL, Intravenous, Q12H, Renetta Gatica M II, DO, 10 mL at 10/08/22 2021  •  sodium chloride 0.9 % flush 10 mL, 10 mL, Intravenous, PRN, Renetta Gatica M II, DO  •  sodium chloride nasal spray 1 spray, 1 spray, Nasal, PRN, Aki Gaticacy M II, DO  •  tamsulosin (FLOMAX) 24 hr capsule 0.4 mg, 0.4 mg, Oral, Daily, Renetta Gatica II, DO, 0.4 mg at 10/09/22 0800    Facility-Administered Medications Ordered in Other Encounters:   •  Chlorhexidine Gluconate Cloth 2 % pads 1 application, 1 application, Topical, Q12H PRN, Mohan Arauz, PA    Antibiotics:  Anti-Infectives (From admission, onward)    Ordered     Dose/Rate Route Frequency Start Stop    10/06/22 1130  doxycycline (MONODOX) capsule 100 mg        Ordering Provider: Jenna Campbell MD    100 mg Oral Every 12 Hours Scheduled 10/06/22 2100 10/13/22 2059    10/02/22 0948  piperacillin-tazobactam (ZOSYN) 3.375 g in iso-osmotic dextrose 50 ml (premix)        Ordering Provider: Renetta Gatica II, DO    3.375 g  over 4 Hours Intravenous Every 8 Hours 10/02/22 1800 10/07/22 1307    10/02/22 0948  piperacillin-tazobactam (ZOSYN) 3.375 g in iso-osmotic dextrose 50 ml (premix)        Ordering Provider: Renetta Gatica II, DO    3.375 g  over 30 Minutes Intravenous Once 10/02/22 1100 10/02/22 1231    10/02/22 0348  cefTRIAXone (ROCEPHIN) 2 g/100 mL 0.9% NS IVPB (MBP)        Ordering Provider: Salinas Dunn MD    2 g  over 30 Minutes Intravenous Once 10/02/22 0350 10/02/22 0527            Physical Exam:   Vital Signs  Temp (24hrs), Av.7 °F (36.5 °C), Min:97.3 °F (36.3 °C), Max:98.3 °F (36.8 °C)    Temp  Min: 97.3 °F (36.3 °C)  Max: 98.3 °F (36.8 °C)  BP  Min: 114/78  Max: 138/79  Pulse  Min: 61  Max: 76  Resp  Min: 18  Max: 18  SpO2  Min: 94 %  Max: 100 %    GENERAL: Awake and alert, in no acute distress.   HEENT: Normocephalic, atraumatic.  PERRL. EOMI. No conjunctival injection. No icterus. Oropharynx clear without evidence  of thrush or exudate. No evidence of peridontal disease.    HEART: RRR; No murmur,    LUNGS: Clear to auscultation bilaterally   ABDOMEN: Soft, nontender,  EXT:  1+ edema, right leg erythema scaly skin left 2nd toe with ulceration of pip joint- dressed this am./   :  Without Fonseca catheter- + scrotal edema   MSK: No joint deformity  SKIN: Warm and dry without cutaneous eruptions on Inspection/palpation.    NEURO: Oriented to PPT.  PSYCHIATRIC: Normal insight and judgement. Cooperative with PE    Laboratory Data    Results from last 7 days   Lab Units 10/08/22  0655 10/06/22  0716 10/05/22  0844   WBC 10*3/mm3 5.33 4.94 5.16   HEMOGLOBIN g/dL 11.1* 10.8* 11.0*   HEMATOCRIT % 34.4* 34.3* 35.9*   PLATELETS 10*3/mm3 169 152 154     Results from last 7 days   Lab Units 10/08/22  0655   SODIUM mmol/L 137   POTASSIUM mmol/L 4.4   CHLORIDE mmol/L 102   CO2 mmol/L 25.0   BUN mg/dL 24*   CREATININE mg/dL 1.77*   GLUCOSE mg/dL 141*   CALCIUM mg/dL 8.8     Results from last 7 days   Lab Units 10/08/22  0655   ALK PHOS U/L 62   BILIRUBIN mg/dL 0.2   ALT (SGPT) U/L 13   AST (SGOT) U/L 11                         Estimated Creatinine Clearance: 47.6 mL/min (A) (by C-G formula based on SCr of 1.77 mg/dL (H)).      Microbiology:  Blood Culture   Date Value Ref Range Status   10/02/2022 No growth at 5 days  Final   10/02/2022 No growth at 5 days  Final     No results found for: BCIDPCR, CXREFLEX, CSFCX, CULTURETIS  No results found for: CULTURES, HSVCX, URCX  No results found for: EYECULTURE, GCCX, HSVCULTURE, LABHSV  No results found for: LEGIONELLA, MRSACX, MUMPSCX, MYCOPLASCX  No results found for: NOCARDIACX, STOOLCX  No results found for: THROATCX, UNSTIMCULT, URINECX, CULTURE, VZVCULTUR  No results found for: VIRALCULTU, WOUNDCX        Radiology:  Imaging Results (Last 72 Hours)     ** No results found for the last 72 hours. **            Impression:   Left great toe amputation 8/2/2022  Ischemic cardiomyopathy  Right leg  cellulitis- improving   Vancomycin allergy  pacemaker    PLAN/RECOMMENDATIONS:      Favor streptococcal process of right leg. This appears to be improving      Continue  oral doxycyline 100 mg po bid x 1 month      Left second toe with ulceration, no severe swelling, no malodorous drainage; Patient could be monitored as an outpatient and can bear weight on left leg;   Would consider outpatient f/u and toe amputation if worsening.      F/u with Dr. Braga in 1 month       has obtained the history, performed the physical exam and formulated the above treatment plan.     Diamond Mckinnon, DAVID  10/9/2022  08:22 EDT

## 2022-10-09 NOTE — PLAN OF CARE
Goal Outcome Evaluation:      Pt VSS, on 2L NC while asleep, RA while awake, paced rhythm overnight, A&Ox4. Pt had no c/o pain or SOA overnight, rested comfortably.

## 2022-10-09 NOTE — PROGRESS NOTES
"    [facility] Medicine Services  PROGRESS NOTE    Patient Name: Jermaine Singh  : 1945  MRN: [id number]    Date of Admission: 10/2/2022  Primary Care Physician: Farooq Painter MD    Subjective     CC: f/u a/c CHF     HPI:  Sitting in chair, no family at bedside. Feeling well today. Says he slept better last night - thinks it's because he didn't take Sertraline. He does not want to continue taking this medication. Lower extremity edema improved, scrotal edema is \"about the same.\" He continues to feel weak and wishes to go to [facility] for rehab. He requested a warm blanket.     ROS:  Gen- No fevers, chills  CV- No chest pain, palpitations  Resp- No cough, dyspnea  GI- No N/V or abdominal pain. (+) loose stools   - (+) scrotal edema     Objective     Vital Signs:   Temp:  [97.3 °F (36.3 °C)-97.9 °F (36.6 °C)] 97.9 °F (36.6 °C)  Heart Rate:  [61-76] 64  Resp:  [18] 18  BP: (114-148)/(70-92) 148/92  Flow (L/min):  [2] 2     Physical Exam:  Constitutional: No acute distress, awake, alert and conversant. Sitting up in chair   HENT: NCAT, mucous membranes moist  Respiratory: Clear to auscultation bilaterally, respiratory effort normal on room air   Cardiovascular: RRR, no murmurs, rubs, or gallops  Gastrointestinal: Positive bowel sounds, soft, nontender, nondistended  : Scrotal exam deferred   Musculoskeletal: 1+ RLE edema with mild erythema / warmth to touch. No LLE edema. Did not examine L toe amputation site today   Psychiatric: Appropriate affect, cooperative  Neurologic: Oriented x 3, moves all extremities spontaneously without focal deficits, speech clear    Results Reviewed:  LAB RESULTS:      Lab 10/08/22  0655 10/06/22  0716 10/05/22  0844 10/04/22  0925 10/03/22  0038   WBC 5.33 4.94 5.16 5.65 5.13   HEMOGLOBIN 11.1* 10.8* 11.0* 10.4* 9.7*   HEMATOCRIT 34.4* 34.3* 35.9* 32.9* 30.8*   PLATELETS 169 152 154 153 131*   NEUTROS ABS 3.30 2.94  --   --  3.22   IMMATURE GRANS " (ABS) 0.01 0.01  --   --  0.01   LYMPHS ABS 1.15 1.12  --   --  1.25   MONOS ABS 0.56 0.55  --   --  0.44   EOS ABS 0.25 0.27  --   --  0.15   MCV 91.2 93.0 93.7 93.2 93.6   PROTIME  --   --   --   --  17.9*   APTT  --   --   --   --  42.3*   HEPARIN ANTI-XA  --   --   --   --  >1.10*         Lab 10/09/22  1014 10/08/22  0655 10/07/22  0638 10/06/22  0716 10/05/22  0845   SODIUM 138 137 136 140 137   POTASSIUM 4.4 4.4 4.9 4.7 4.8   CHLORIDE 103 102 101 102 99   CO2 25.0 25.0 23.0 27.0 26.0   ANION GAP 10.0 10.0 12.0 11.0 12.0   BUN 24* 24* 26* 29* 31*   CREATININE 1.61* 1.77* 1.70* 1.86* 2.04*   EGFR 43.8* 39.1* 41.0* 36.8* 32.9*   GLUCOSE 140* 141* 147* 85 117*   CALCIUM 8.8 8.8 9.0 9.0 9.1         Lab 10/08/22  0655 10/06/22  0716 10/05/22  0845 10/04/22  0925   TOTAL PROTEIN 5.4* 5.6* 6.0 5.8*   ALBUMIN 3.30* 3.60 3.80 3.70   GLOBULIN 2.1 2.0 2.2 2.1   ALT (SGPT) 13 11 13 13   AST (SGOT) 11 7 9 10   BILIRUBIN 0.2 0.2 0.3 0.2   ALK PHOS 62 68 78 79         Lab 10/03/22  1044 10/03/22  0038 10/02/22  2205 10/02/22  2020 10/02/22  1435   TROPONIN T 0.132*  --  0.151* 0.129* 0.107*   PROTIME  --  17.9*  --   --   --    INR  --  1.48*  --   --   --      Brief Urine Lab Results  (Last result in the past 365 days)      Color   Clarity   Blood   Leuk Est   Nitrite   Protein   CREAT   Urine HCG        08/23/22 1309             59.2         08/23/22 1309 [place]   Cloudy   Large (3+)   Trace   Negative   30 mg/dL (1+)               Microbiology Results Abnormal     Procedure Component Value - Date/Time    Blood Culture - Blood, Arm, Left [[id number]]  (Normal) Collected: 10/02/22 0433    Lab Status: Final result Specimen: Blood from Arm, Left Updated: 10/07/22 0647     Blood Culture No growth at 5 days    Blood Culture - Blood, Arm, Left [[id number]]  (Normal) Collected: 10/02/22 0433    Lab Status: Final result Specimen: Blood from Arm, Left Updated: 10/07/22 0647     Blood Culture No growth at 5 days        No radiology  results from the last 24 hrs    Results for orders placed during the hospital encounter of 07/27/22    Adult Transthoracic Echo Complete W/ Cont if Necessary Per Protocol    Interpretation Summary  · Left ventricular ejection fraction appears to be 46 - 50%. Left ventricular systolic function is low normal.  · Left ventricular septal hypokinesis  · No significant valvular heart disease    I have reviewed the medications:  Scheduled Meds:apixaban, 5 mg, Oral, Q12H  aspirin, 81 mg, Oral, Every Other Day  atorvastatin, 20 mg, Oral, Nightly  bethanechol, 10 mg, Oral, TID  bumetanide, 1 mg, Oral, Daily  carvedilol, 3.125 mg, Oral, BID With Meals  doxycycline, 100 mg, Oral, Q12H  famotidine, 20 mg, Oral, Daily  insulin detemir, 8 Units, Subcutaneous, Nightly  insulin lispro, 0-9 Units, Subcutaneous, TID AC  Insulin Lispro, 3 Units, Subcutaneous, TID With Meals  levothyroxine, 75 mcg, Oral, Q AM  pharmacy consult - MTM, , Does not apply, Daily  saccharomyces boulardii, 250 mg, Oral, BID  sertraline, 25 mg, Oral, Nightly  sodium chloride, 10 mL, Intravenous, Q12H  sodium chloride, 10 mL, Intravenous, Q12H      Continuous Infusions:   PRN Meds:.•  acetaminophen **OR** acetaminophen **OR** acetaminophen  •  senna-docusate sodium **AND** polyethylene glycol **AND** bisacodyl **AND** bisacodyl  •  dextrose  •  dextrose  •  Diclofenac Sodium  •  glucagon (human recombinant)  •  HYDROcodone-acetaminophen  •  influenza vaccine  •  ipratropium-albuterol  •  magnesium sulfate **OR** magnesium sulfate in D5W 1g/100mL (PREMIX)  •  nitroglycerin  •  ondansetron **OR** ondansetron  •  sodium chloride  •  sodium chloride  •  sodium chloride  •  sodium chloride    Assessment & Plan     Active Hospital Problems    Diagnosis  POA   • Elevated troponin [R77.8]  Yes   • Cellulitis of right lower extremity [L03.115]  Yes   • ALEXANDRIA (acute kidney injury) (HCC) [N17.9]  Yes   • Acute on chronic HFrEF (heart failure with reduced ejection fraction)  ([facility]) [I50.23]  Yes   • SSS (sick sinus syndrome) ([facility]) [I49.5]  Yes   • Type 2 diabetes mellitus ([facility]) [E11.9]  Yes   • Hypertension [I10]  Yes   • Hyperlipidemia [E78.5]  Yes   • Hypothyroidism (acquired) [E03.9]  Yes   • S/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 Au [Z95.1]  Not Applicable      Resolved Hospital Problems    Diagnosis Date Resolved POA   • Angina at rest ([facility]) [I20.8] 10/02/2022 Yes     Brief Hospital Course to date:  Jermaine Singh is a 77 y.o. male with PMH significant for HTN, HLD, CAD (s/p CABG x 3 in 2019), SSS s/p PPM, PVD (s/p RLE angioplasty 7/2021 and L great toe amputation 8/2/2022), CKD III and chronic systolic CHF. Recent hospitalization at [facility] 7/27-8/26/22 for L 2ndt toe osteomyelitis s/p amputation with subsequent ALEXANDRIA and acute hypoxic respiratory failure secondary to systolic CHF exacerbation. Also found to have bilateral DVTs during this admission. He discharged to [facility] for rehab.     He returned to [facility] 10/2/22 for evaluation of shortness of breath and lower extremity edema. Also RLE cellulitis (PCP started on Bactrim outpatient without improvement. He was admitted to the hospital medicine service for acute on chronic systolic CHF exacerbation and RLE cellulitis.     Hypoxia  Acute on chronic systolic CHF   Right pleural effusion  Elevated troponin  - Hypoxic in ED. Required 4L NC to maintain O2 saturations. Has weaned to room air   - Appreciate cardiology assistance  - Elevated troponin felt to be due to CHF in the setting of renal failure. No chest pain / EKG changes.   - 10/3 Lexiscan stress test showed anteroseptal scar with no significant ischemia. LVEF 42%  - PPM interrogated this admission  - s/p IV diuresis. Now on Bumex 1mg PO daily   - Continue Carvedilol. No ACEI / ARB / ARNI or aldactone due to renal dysfunction     Acute urinary retention  - Likely due to severe scrotal edema in the setting of a/c CHF  - Continue scrotal support  -  Patient refused catheter. He reported to me that he is voiding spontaneously    [place]RIA   - Multifactorial in the setting of Bactrim use and a/c CHF  - New baseline creatinine following recent admission appears to be ~ 1.6   - Creatinine peaked at 2.18 on 10/2, improved to 1.61 today    RLE cellulitis  PVD with recent L great toe amputation by Dr. Márquez  - Failed outpatient treatment with Bactrim  - Appreciate Dr. Márquez / ID assistance  - May need L 2nd toe amputation due to exposed middle phalangeal bone / middle phalangeal joint space from hammertoe phenomena   - For now, WBAT with offloading shoe  - Now on PO Doxycycline per ID   - Add probiotic due to loose stools     Insulin-dependent DMII  - Hgb A1c 7.2%   - Decrease Levemir to 8 units QHS, add Lispro 3 units TID AC + SSI      Depression / anxiety  - My partner started Sertraline 25mg QHS on 10/6 - patient now believes this medication is contributing to poor sleep and he does not want to continue taking it  - Could consider Mirtazapine QHs     Generalized weakness  - PT/OT following. He is requiring assist x 2 - recommend rehab  - Patient interested in [facility]. Will need referral started tomorrow    Expected Discharge Location and Transportation: rehab   Expected Discharge Date: 10/11/22     DVT prophylaxis:Medical DVT prophylaxis orders are present.     AM-PAC 6 Clicks Score (PT): 19 (10/09/22 7034)    CODE STATUS:   Code Status and Medical Interventions:   Ordered at: 10/02/22 0949     Code Status (Patient has no pulse and is not breathing):    CPR (Attempt to Resuscitate)     Medical Interventions (Patient has pulse or is breathing):    Full Support     Melissa Nguyen PA-C  10/09/22

## 2022-10-09 NOTE — THERAPY TREATMENT NOTE
Patient Name: Jermaine Singh  : 1945    MRN: [id number]                              Today's Date: 10/9/2022       Admit Date: 10/2/2022    Visit Dx:     ICD-10-CM ICD-9-CM   1. Angina at rest ([facility])  I20.8 413.9   2. Systolic congestive heart failure, unspecified HF chronicity ([facility])  I50.20 428.20     428.0   3. 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  Preserved LV function   • Type 2 diabetes mellitus ([facility])   • Hypertension   • Hyperlipidemia   • Hypothyroidism (acquired)   • Vitamin D deficiency on Rx    • Serum Cr 1.32 on admission.  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 Au   • Dizziness   • Atherosclerosis of native artery of both lower extremities with intermittent claudication ([facility])   • SSS (sick sinus syndrome) ([facility])   • Subacute osteomyelitis of left foot ([facility])   • Lactic acidosis   • Proximal phalanx fracture of the second digit extending into the second metatarsal joint   • [place] (acute kidney injury) ([facility])   • Acute on chronic HFrEF (heart failure with reduced ejection fraction) ([facility])   • DVT, bilateral lower limbs ([facility])   • Elevated troponin   • Cellulitis of right lower extremity     Past Medical History:   Diagnosis Date   • Asthma    • Coronary artery disease    • Diabetes mellitus ([facility])     started on inuslin 2018; started on po meds in ; checking blood sugars daily    • Disease of thyroid gland     po meds daily for hypothyroidism    • History of fracture as a child     rt leg- severe    • Hyperlipidemia    • Hypertension    • Hypothyroidism    • Peripheral neuropathy    • Peripheral vascular disease ([facility])     s/p angiogram -needs stent in left leg    • RBBB    • Right knee pain    • Vitamin D deficiency      Past Surgical History:   Procedure Laterality Date   • APPENDECTOMY     • CARDIAC  CATHETERIZATION N/A 2/14/2019    Procedure: Left Heart Cath;  Surgeon: Cooper Apodaca MD;  Location:  [facility];  Service: Cardiology   • CARDIAC ELECTROPHYSIOLOGY PROCEDURE N/A 5/18/2022    Procedure: DEVICE IMPLANT;  Surgeon: Cooper Apodaca MD;  Location:  [facility];  Service: Cardiology;  Laterality: N/A;   • COLONOSCOPY     • CORONARY ARTERY BYPASS GRAFT N/A 3/22/2019    Procedure: MEDIAN STERNOTOMY, CORONARY ARTERY BYPASS GRAFT X3, UTILIZING THE LEFT INTERNAL MAMMARY ARTERY, EVH AND OPEN HARVEST OF THE RIGHT GREATER SAPHENOUS VEIN, EXPLORATION OF THE LEFT LEG;  Surgeon: Ap Au MD;  Location:  [facility];  Service: Cardiothoracic   • EYE SURGERY Bilateral     cataracts    • INTERVENTIONAL RADIOLOGY PROCEDURE N/A 7/29/2021    Procedure: Abdominal Aortagram with Runoff;  Surgeon: Jermaine Hernandez MD;  Location:  [facility];  Service: Cardiovascular;  Laterality: N/A;   • KNEE ARTHROSCOPY      right x 2, left x 1   • LACERATION REPAIR      right leg   • LEG SURGERY      2 for fracture of rt leg    • TONSILLECTOMY      Adnoidectomy   • TRANS METATARSAL AMPUTATION Left 8/2/2022    Procedure: GREAT TOE AMPUTATION LEFT;  Surgeon: Gopal Márquez MD;  Location:  [facility];  Service: Vascular;  Laterality: Left;      General Information     Row Name 10/09/22 1150          Physical Therapy Time and Intention    Document Type therapy note (daily note)  -AY     Mode of Treatment physical therapy  -AY     Row Name 10/09/22 1150          General Information    Patient Profile Reviewed yes  -AY     Existing Precautions/Restrictions fall;cardiac;other (see comments)  L offloading shoe  -AY     Barriers to Rehab medically complex  -AY     Row Name 10/09/22 1150          Cognition    Orientation Status (Cognition) oriented x 4  -AY     Row Name 10/09/22 1150          Safety Issues, Functional Mobility    Safety Issues Affecting Function (Mobility)  insight into deficits/self-awareness;safety precautions follow-through/compliance;sequencing abilities;awareness of need for assistance;safety precaution awareness  -AY     Impairments Affecting Function (Mobility) balance;endurance/activity tolerance;strength  -AY           User Key  (r) = Recorded By, (t) = Taken By, (c) = Cosigned By    Initials Name Provider Type    AY Macrina Lazcano, PT Physical Therapist               Mobility     Row Name 10/09/22 1151          Sit-Stand Transfer    Sit-Stand Cecil (Transfers) moderate assist (50% patient effort);1 person assist;verbal cues  -AY     Assistive Device (Sit-Stand Transfers) walker, front-wheeled  -AY     Comment, (Sit-Stand Transfer) min A to stand from recliner. Mod A to stand from lower surface height with cueing for sequencing and assist for set-up.  -AY     Row Name 10/09/22 1151          Gait/Stairs (Locomotion)    Cecil Level (Gait) contact guard;1 person assist;verbal cues  -AY     Assistive Device (Gait) walker, front-wheeled  -AY     Distance in Feet (Gait) 30 x2  -AY     Deviations/Abnormal Patterns (Gait) ashli decreased;festinating/shuffling;bilateral deviations;gait speed decreased;antalgic;stride length decreased;weight shifting decreased;base of support, wide  -AY     Bilateral Gait Deviations forward flexed posture;heel strike decreased  -AY     Left Sided Gait Deviations weight shift ability decreased  -AY     Comment, (Gait/Stairs) pt demonstrated step through gait pattern with decreased stance on LLE. Cueing for posture, increased stride length, and walker positioning required. Mild instability noted throughout. One seated rest break required. Gait distance limited by fatigue.  -AY     Row Name 10/09/22 1151          Mobility    Extremity Weight-bearing Status left lower extremity  -AY     Left Lower Extremity (Weight-bearing Status) weight-bearing as tolerated (WBAT)  -AY           User Key  (r) = Recorded By, (t) = Taken By,  (c) = Cosigned By    Initials Name Provider Type    AY Macrina Lazcano PT Physical Therapist               Obj/Interventions     Row Name 10/09/22 1152          Balance    Balance Assessment sitting static balance;sitting dynamic balance;sit to stand dynamic balance;standing dynamic balance;standing static balance  -AY     Static Sitting Balance independent  -AY     Dynamic Sitting Balance independent  -AY     Position, Sitting Balance unsupported;sitting in chair  -AY     Sit to Stand Dynamic Balance minimal assist  -AY     Static Standing Balance contact guard  -AY     Dynamic Standing Balance contact guard  -AY     Position/Device Used, Standing Balance supported;walker, rolling  -AY           User Key  (r) = Recorded By, (t) = Taken By, (c) = Cosigned By    Initials Name Provider Type    AY Macrina Lazcano PT Physical Therapist               Goals/Plan    No documentation.                Clinical Impression     Row Name 10/09/22 1153          Pain    Pretreatment Pain Rating 0/10 - no pain  -AY     Posttreatment Pain Rating 0/10 - no pain  -AY     Pain Intervention(s) Ambulation/increased activity;Repositioned  -AY     Additional Documentation Pain Scale: Numbers Pre/Post-Treatment (Group)  -AY     Row Name 10/09/22 1153          Plan of Care Review    Plan of Care Reviewed With patient  -AY     Progress no change  -AY     Outcome Evaluation Pt amb 30ft x2 with RWx and CGA. Mod A required to stand from lower surfaces. Cont to progress as able. d/c rec for IRF.  -AY     Row Name 10/09/22 1153          Vital Signs    Pre Systolic BP Rehab --  VSS  -AY     O2 Delivery Pre Treatment room air  -AY     O2 Delivery Intra Treatment room air  -AY     O2 Delivery Post Treatment room air  -AY     Pre Patient Position Sitting  -AY     Intra Patient Position Standing  -AY     Post Patient Position Sitting  -AY     Row Name 10/09/22 1153          Positioning and Restraints    Pre-Treatment Position sitting in chair/recliner   -AY     Post Treatment Position chair  -AY     In Chair notified nsg;reclined;sitting;call light within reach;encouraged to call for assist;exit alarm on;waffle cushion;legs elevated;LUE elevated;RUE elevated  -AY           User Key  (r) = Recorded By, (t) = Taken By, (c) = Cosigned By    Initials Name Provider Type    Macrina Dennis, PT Physical Therapist               Outcome Measures     Row Name 10/09/22 1154 10/09/22 0800       How much help from another person do you currently need...    Turning from your back to your side while in flat bed without using bedrails? 4  -AY 4  -AN    Moving from lying on back to sitting on the side of a flat bed without bedrails? 3  -AY 4  -AN    Moving to and from a bed to a chair (including a wheelchair)? 3  -AY 3  -AN    Standing up from a chair using your arms (e.g., wheelchair, bedside chair)? 3  -AY 3  -AN    Climbing 3-5 steps with a railing? 3  -AY 2  -AN    To walk in hospital room? 3  -AY 3  -AN    AM-PAC 6 Clicks Score (PT) 19  -AY 19  -AN    Highest level of mobility 6 --> Walked 10 steps or more  -AY 6 --> Walked 10 steps or more  -AN    Row Name 10/09/22 1154          Functional Assessment    Outcome Measure Options AM-PAC 6 Clicks Basic Mobility (PT)  -AY           User Key  (r) = Recorded By, (t) = Taken By, (c) = Cosigned By    Initials Name Provider Type    Dixie Vargas RN Registered Nurse    Macrina Dennis, PT Physical Therapist                             Physical Therapy Education     Title: PT OT SLP Therapies (In Progress)     Topic: Physical Therapy (In Progress)     Point: Mobility training (Done)     Learning Progress Summary           Patient Acceptance, E,TB, VU,NR by ELENA at 10/9/2022 1154    Acceptance, E, NR by ROSELIA at 10/7/2022 1330    Acceptance, E,D, VU,NR by MB at 10/4/2022 1559                   Point: Home exercise program (In Progress)     Learning Progress Summary           Patient Acceptance, E, NR by ROSELIA at 10/7/2022 1330     Acceptance, E,D, VU,NR by MB at 10/4/2022 1559                   Point: Body mechanics (Done)     Learning Progress Summary           Patient Acceptance, E,TB, VU,NR by  at 10/9/2022 1154    Acceptance, E, NR by ROSELIA at 10/7/2022 1330    Acceptance, E,D, VU,NR by MB at 10/4/2022 1559                   Point: Precautions (Done)     Learning Progress Summary           Patient Acceptance, E,TB, VU,NR by AY at 10/9/2022 1154    Acceptance, E, NR by ROSELIA at 10/7/2022 1330    Acceptance, E,D, VU,NR by MB at 10/4/2022 1559                               User Key     Initials Effective Dates Name Provider Type Discipline    ROSELIA 06/16/21 -  Montserrat Knox, PT Physical Therapist PT    MB 06/16/21 -  Dixie Carrizales PT Physical Therapist PT     11/10/20 -  Macrina Lazcano, PT Physical Therapist PT              PT Recommendation and Plan     Plan of Care Reviewed With: patient  Progress: no change  Outcome Evaluation: Pt amb 30ft x2 with RWx and CGA. Mod A required to stand from lower surfaces. Cont to progress as able. d/c rec for IRF.     Time Calculation:    PT Charges     Row Name 10/09/22 1155             Time Calculation    Start Time 1019  -AY      PT Received On 10/09/22  -AY         Timed Charges    50597 - PT Therapeutic Activity Minutes 25  -AY         Total Minutes    Timed Charges Total Minutes 25  -AY       Total Minutes 25  -AY            User Key  (r) = Recorded By, (t) = Taken By, (c) = Cosigned By    Initials Name Provider Type     Macrina Lazcano, SETH Physical Therapist              Therapy Charges for Today     Code Description Service Date Service Provider Modifiers Qty    [id number]  PT THERAPEUTIC ACT EA 15 MIN 10/9/2022 Macrina Lazcano, PT GP 2          PT G-Codes  Outcome Measure Options: AM-PAC 6 Clicks Basic Mobility (PT)  AM-PAC 6 Clicks Score (PT): 19  AM-PAC 6 Clicks Score (OT): 17    Macrina Lazcano PT  10/9/2022

## 2022-10-09 NOTE — PLAN OF CARE
Goal Outcome Evaluation:  Plan of Care Reviewed With: patient        Progress: no change  Outcome Evaluation: Pt amb 30ft x2 with RWx and CGA. Mod A required to stand from lower surfaces. Cont to progress as able. d/c rec for IRF.

## 2022-10-10 LAB
GLUCOSE BLDC GLUCOMTR-MCNC: 103 MG/DL (ref 70–130)
GLUCOSE BLDC GLUCOMTR-MCNC: 168 MG/DL (ref 70–130)
GLUCOSE BLDC GLUCOMTR-MCNC: 188 MG/DL (ref 70–130)
GLUCOSE BLDC GLUCOMTR-MCNC: 215 MG/DL (ref 70–130)

## 2022-10-10 PROCEDURE — 63710000001 INSULIN LISPRO (HUMAN) PER 5 UNITS: Performed by: INTERNAL MEDICINE

## 2022-10-10 PROCEDURE — 97168 OT RE-EVAL EST PLAN CARE: CPT

## 2022-10-10 PROCEDURE — 99232 SBSQ HOSP IP/OBS MODERATE 35: CPT | Performed by: PHYSICIAN ASSISTANT

## 2022-10-10 PROCEDURE — 63710000001 INSULIN DETEMIR PER 5 UNITS: Performed by: PHYSICIAN ASSISTANT

## 2022-10-10 PROCEDURE — 82962 GLUCOSE BLOOD TEST: CPT

## 2022-10-10 PROCEDURE — 99024 POSTOP FOLLOW-UP VISIT: CPT | Performed by: THORACIC SURGERY (CARDIOTHORACIC VASCULAR SURGERY)

## 2022-10-10 PROCEDURE — 97535 SELF CARE MNGMENT TRAINING: CPT

## 2022-10-10 RX ADMIN — Medication 10 ML: at 20:11

## 2022-10-10 RX ADMIN — ATORVASTATIN CALCIUM 20 MG: 20 TABLET, FILM COATED ORAL at 20:10

## 2022-10-10 RX ADMIN — BETHANECHOL CHLORIDE 10 MG: 10 TABLET ORAL at 17:40

## 2022-10-10 RX ADMIN — INSULIN DETEMIR 8 UNITS: 100 INJECTION, SOLUTION SUBCUTANEOUS at 20:10

## 2022-10-10 RX ADMIN — CARVEDILOL 3.12 MG: 3.12 TABLET, FILM COATED ORAL at 17:40

## 2022-10-10 RX ADMIN — INSULIN LISPRO 2 UNITS: 100 INJECTION, SOLUTION INTRAVENOUS; SUBCUTANEOUS at 17:41

## 2022-10-10 RX ADMIN — DOXYCYCLINE 100 MG: 100 CAPSULE ORAL at 08:19

## 2022-10-10 RX ADMIN — FAMOTIDINE 20 MG: 20 TABLET ORAL at 08:20

## 2022-10-10 RX ADMIN — DOXYCYCLINE 100 MG: 100 CAPSULE ORAL at 20:10

## 2022-10-10 RX ADMIN — APIXABAN 5 MG: 5 TABLET, FILM COATED ORAL at 20:10

## 2022-10-10 RX ADMIN — APIXABAN 5 MG: 5 TABLET, FILM COATED ORAL at 08:19

## 2022-10-10 RX ADMIN — BUMETANIDE 1 MG: 1 TABLET ORAL at 08:19

## 2022-10-10 RX ADMIN — CARVEDILOL 3.12 MG: 3.12 TABLET, FILM COATED ORAL at 08:19

## 2022-10-10 RX ADMIN — Medication 250 MG: at 08:19

## 2022-10-10 RX ADMIN — BETHANECHOL CHLORIDE 10 MG: 10 TABLET ORAL at 20:14

## 2022-10-10 RX ADMIN — BETHANECHOL CHLORIDE 10 MG: 10 TABLET ORAL at 08:26

## 2022-10-10 RX ADMIN — LEVOTHYROXINE SODIUM 75 MCG: 0.07 TABLET ORAL at 06:27

## 2022-10-10 RX ADMIN — INSULIN LISPRO 2 UNITS: 100 INJECTION, SOLUTION INTRAVENOUS; SUBCUTANEOUS at 08:20

## 2022-10-10 RX ADMIN — Medication 250 MG: at 20:10

## 2022-10-10 NOTE — THERAPY RE-EVALUATION
Patient Name: Jermaine Singh  : 1945    MRN: [id number]                              Today's Date: 10/10/2022       Admit Date: 10/2/2022    Visit Dx:     ICD-10-CM ICD-9-CM   1. Angina at rest ([facility])  I20.8 413.9   2. Systolic congestive heart failure, unspecified HF chronicity ([facility])  I50.20 428.20     428.0   3. 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  Preserved LV function   • Type 2 diabetes mellitus ([facility])   • Hypertension   • Hyperlipidemia   • Hypothyroidism (acquired)   • Vitamin D deficiency on Rx    • Serum Cr 1.32 on admission.  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 Au   • Dizziness   • Atherosclerosis of native artery of both lower extremities with intermittent claudication ([facility])   • SSS (sick sinus syndrome) ([facility])   • Subacute osteomyelitis of left foot ([facility])   • Lactic acidosis   • Proximal phalanx fracture of the second digit extending into the second metatarsal joint   • [place] (acute kidney injury) ([facility])   • Acute on chronic HFrEF (heart failure with reduced ejection fraction) ([facility])   • DVT, bilateral lower limbs ([facility])   • Elevated troponin   • Cellulitis of right lower extremity     Past Medical History:   Diagnosis Date   • Asthma    • Coronary artery disease    • Diabetes mellitus ([facility])     started on [place] 2018; started on po meds in ; checking blood sugars daily    • Disease of thyroid gland     po meds daily for hypothyroidism    • History of fracture as a child     rt leg- severe    • Hyperlipidemia    • Hypertension    • Hypothyroidism    • Peripheral neuropathy    • Peripheral vascular disease ([facility])     s/p angiogram -needs stent in left leg    • RBBB    • Right knee pain    • Vitamin D deficiency      Past Surgical History:   Procedure Laterality Date   • APPENDECTOMY     • CARDIAC  CATHETERIZATION N/A 2/14/2019    Procedure: Left Heart Cath;  Surgeon: Cooper Apodaca MD;  Location:  [facility] CATH INVASIVE LOCATION;  Service: Cardiology   • CARDIAC ELECTROPHYSIOLOGY PROCEDURE N/A 5/18/2022    Procedure: DEVICE IMPLANT;  Surgeon: Cooper Apodaca MD;  Location:  [facility] CATH INVASIVE LOCATION;  Service: Cardiology;  Laterality: N/A;   • COLONOSCOPY     • CORONARY ARTERY BYPASS GRAFT N/A 3/22/2019    Procedure: MEDIAN STERNOTOMY, CORONARY ARTERY BYPASS GRAFT X3, UTILIZING THE LEFT INTERNAL MAMMARY ARTERY, EVH AND OPEN HARVEST OF THE RIGHT GREATER SAPHENOUS VEIN, EXPLORATION OF THE LEFT LEG;  Surgeon: Ap Au MD;  Location:  [facility] OR;  Service: Cardiothoracic   • EYE SURGERY Bilateral     cataracts    • INTERVENTIONAL RADIOLOGY PROCEDURE N/A 7/29/2021    Procedure: Abdominal Aortagram with Runoff;  Surgeon: Jermaine Hernandez MD;  Location:  [facility]ATH [facility];  Service: Cardiovascular;  Laterality: N/A;   • KNEE ARTHROSCOPY      right x 2, left x 1   • LACERATION REPAIR      right leg   • LEG SURGERY      2 for fracture of rt leg    • TONSILLECTOMY      Adnoidectomy   • TRANS METATARSAL AMPUTATION Left 8/2/2022    Procedure: GREAT TOE AMPUTATION LEFT;  Surgeon: Gopal Márquez MD;  Location:  [facility];  Service: Vascular;  Laterality: Left;      General Information     Row Name 10/10/22 1315          OT Time and Intention    Document Type re-evaluation  -KF     Mode of Treatment occupational therapy  -KF     Row Name 10/10/22 1315          General Information    Patient Profile Reviewed yes  -KF     Prior Level of Function independent:;all household mobility;transfer;bed mobility;ADL's  prior to July 2022; post rehab recently modA ADLs and Tristan with FWW short distances with wc longer distances  -KF     Existing Precautions/Restrictions fall;oxygen therapy device and L/min  WBAT LLE with offloading shoe  -KF     Barriers to Rehab medically complex  -KF     Row Name  10/10/22 1315          Living Environment    People in Home spouse  -     Row Name 10/10/22 1315          Home Main Entrance    Number of Stairs, Main Entrance none  -     Row Name 10/10/22 1315          Stairs Within Home, Primary    Number of Stairs, Within Home, Primary none  -     Row Name 10/10/22 1315          Cognition    Orientation Status (Cognition) oriented x 4  -     Row Name 10/10/22 1315          Safety Issues, Functional Mobility    Safety Issues Affecting Function (Mobility) insight into deficits/self-awareness;awareness of need for assistance;safety precaution awareness  -     Impairments Affecting Function (Mobility) balance;endurance/activity tolerance;strength;pain  -     Comment, Safety Issues/Impairments (Mobility) scrotal edema  -           User Key  (r) = Recorded By, (t) = Taken By, (c) = Cosigned By    Initials Name Provider Type    KF Mercedez Navas, OT Occupational Therapist                 Mobility/ADL's     Row Name 10/10/22 1317          Bed Mobility    Comment, (Bed Mobility) UIC  -     Row Name 10/10/22 1317          Transfers    Transfers sit-stand transfer;stand-sit transfer  -     Comment, (Transfers) offloading shoe donned throughout  -     Row Name 10/10/22 1317          Sit-Stand Transfer    Sit-Stand [place] (Transfers) contact guard  -     Assistive Device (Sit-Stand Transfers) walker, front-wheeled  -     Row Name 10/10/22 1317          Stand-Sit Transfer    Stand-Sit [place] (Transfers) minimum assist (75% patient effort)  -     Assistive Device (Stand-Sit Transfers) walker, front-wheeled  -     Comment, (Stand-Sit Transfer) requires assist due to descending too quickly to chair  -     Row Name 10/10/22 1317          Functional Mobility    Functional Mobility- Comment deferred to PT  -     Row Name 10/10/22 1317          Activities of Daily Living    BADL Assessment/Intervention lower body dressing  -     Row Name 10/10/22  1317          Mobility    Extremity Weight-bearing Status left lower extremity  -KF     Left Lower Extremity (Weight-bearing Status) weight-bearing as tolerated (WBAT)  with offloading shoe  -KF     Row Name 10/10/22 1317          Upper Body Dressing Assessment/Training    [place] Level (Upper Body Dressing) upper body dressing skills;minimum assist (75% patient effort)  -KF     Position (Upper Body Dressing) supported standing  -KF     Comment, (Upper Body Dressing) declined UBD at this time, requires some assist for gown management in standing  -KF     Row Name 10/10/22 1317          Lower Body Dressing Assessment/Training    [place] Level (Lower Body Dressing) don;doff;socks;shoes/slippers;dependent (less than 25% patient effort)  -KF     Position (Lower Body Dressing) supported standing;supported sitting  -KF     Comment, (Lower Body Dressing) education provided on use of undergarment as scrotal sling 2/2 edema and discomfort  -KF           User Key  (r) = Recorded By, (t) = Taken By, (c) = Cosigned By    Initials Name Provider Type    KF Mercedez Navas, OT Occupational Therapist               Obj/Interventions     Row Name 10/10/22 1320          Sensory Assessment (Somatosensory)    Sensory Assessment (Somatosensory) UE sensation intact  -KF     Row Name 10/10/22 1320          Vision Assessment/Intervention    Visual Impairment/Limitations WFL  -KF     Row Name 10/10/22 1320          Range of Motion Comprehensive    General Range of Motion bilateral upper extremity ROM WFL  -KF     Comment, General Range of Motion chronic LUE deficits  -KF     Row Name 10/10/22 1320          Strength Comprehensive (MMT)    General Manual Muscle Testing (MMT) Assessment upper extremity strength deficits identified  -KF     Comment, General Manual Muscle Testing (MMT) Assessment LUE unable to MMT due to pain in shoulder; RUE demonstrates 4/5 grossly  -KF     Row Name 10/10/22 1320          Balance    Balance  Assessment sitting static balance;sitting dynamic balance;standing static balance;standing dynamic balance  -KF     Static Sitting Balance independent  -KF     Dynamic Sitting Balance independent  -KF     Position, Sitting Balance unsupported;sitting in chair  -KF     Static Standing Balance contact guard  -KF     Dynamic Standing Balance contact guard  -KF     Position/Device Used, Standing Balance supported;walker, front-wheeled  -KF     Balance Interventions sitting;standing;sit to stand;supported;minimal challenge;UE activity with balance activity  -KF     Comment, Balance able to tolerate briefly standing with incorporated gentle use of UEs, no LOB however limited by activity tolerance  -KF           User Key  (r) = Recorded By, (t) = Taken By, (c) = Cosigned By    Initials Name Provider Type    KF Mercedez Navas, OT Occupational Therapist               Goals/Plan     Row Name 10/10/22 1324          Transfer Goal 1 (OT)    Activity/Assistive Device (Transfer Goal 1, OT) toilet;walker, rolling;commode, bedside without drop arms  BSC for elevation  -KF     [place] Level/Cues Needed (Transfer Goal 1, OT) contact guard required;verbal cues required;tactile cues required  -KF     Time Frame (Transfer Goal 1, OT) long term goal (LTG);10 days  -KF     Progress/Outcome (Transfer Goal 1, OT) new goal;goal ongoing  -KF     Row Name 10/10/22 1329          Dressing Goal 1 (OT)    Activity/Device (Dressing Goal 1, OT) lower body dressing  AE PRN  -KF     [place]/Cues Needed (Dressing Goal 1, OT) minimum assist (75% or more patient effort)  -KF     Time Frame (Dressing Goal 1, OT) long term goal (LTG);10 days  -KF     Progress/Outcome (Dressing Goal 1, OT) goal ongoing;new goal  -KF     Row Name 10/10/22 1325          Therapy Assessment/Plan (OT)    Planned Therapy Interventions (OT) activity tolerance training;adaptive equipment training;BADL retraining;occupation/activity based interventions;strengthening  exercise;edema control/reduction;functional balance retraining;transfer/mobility retraining;ROM/therapeutic exercise;patient/caregiver education/training  -KF           User Key  (r) = Recorded By, (t) = Taken By, (c) = Cosigned By    Initials Name Provider Type    KF Mercedez Navas, OT Occupational Therapist               Clinical Impression     Row Name 10/10/22 1321          Pain Assessment    Pretreatment Pain Rating 0/10 - no pain  -KF     Posttreatment Pain Rating 0/10 - no pain  -KF     Pre/Posttreatment Pain Comment tolerated  -KF     Pain Intervention(s) Repositioned;Ambulation/increased activity  -KF     Row Name 10/10/22 1321          Plan of Care Review    Plan of Care Reviewed With patient  -KF     Progress improving  -KF     Outcome Evaluation OT re-eval completed, Pt demonstrates deficits with standing tolerance, balance, and AROM for ADL completion compared to baseline at this time, depA LBD, Tristan UBD, setup self-feeding, recom IPOT d/c IRF  -     Row Name 10/10/22 1321          Therapy Assessment/Plan (OT)    Rehab Potential (OT) good, to achieve stated therapy goals  -KF     Criteria for Skilled Therapeutic Interventions Met (OT) yes;meets criteria;skilled treatment is necessary  -KF     Therapy Frequency (OT) daily  -KF     Row Name 10/10/22 1321          Therapy Plan Review/Discharge Plan (OT)    Anticipated Discharge Disposition (OT) inpatient rehabilitation facility  -     Row Name 10/10/22 1321          Vital Signs    Pre Systolic BP Rehab --  RN cleared VSS  -KF     O2 Delivery Pre Treatment supplemental O2  -KF     O2 Delivery Post Treatment supplemental O2  above 90% throughout  -KF     Pre Patient Position Sitting  -KF     Intra Patient Position Standing  -KF     Post Patient Position Sitting  -KF     Rest Breaks  1  -KF     Row Name 10/10/22 1321          Positioning and Restraints    Pre-Treatment Position sitting in chair/recliner  -KF     Post Treatment Position chair  -KF      In Chair notified nsg;reclined;sitting;call light within reach;encouraged to call for assist;exit alarm on;with family/caregiver;waffle cushion;legs elevated  -KF           User Key  (r) = Recorded By, (t) = Taken By, (c) = Cosigned By    Initials Name Provider Type    Mercedez Trujillo OT Occupational Therapist               Outcome Measures     Row Name 10/10/22 1325          How much help from another is currently needed...    Putting on and taking off regular lower body clothing? 2  -KF     Bathing (including washing, rinsing, and drying) 2  -KF     Toileting (which includes using toilet bed pan or urinal) 2  -KF     Putting on and taking off regular upper body clothing 3  -KF     Taking care of personal grooming (such as brushing teeth) 3  -KF     Eating meals 4  -KF     AM-PAC 6 Clicks Score (OT) 16  -KF     Row Name 10/10/22 1325          Functional Assessment    Outcome Measure Options AM-PAC 6 Clicks Daily Activity (OT)  -KF           User Key  (r) = Recorded By, (t) = Taken By, (c) = Cosigned By    Initials Name Provider Type    Mercedez Trujillo OT Occupational Therapist                Occupational Therapy Education     Title: PT OT SLP Therapies (In Progress)     Topic: Occupational Therapy (In Progress)     Point: ADL training (Done)     Description:   Instruct learner(s) on proper safety adaptation and remediation techniques during self care or transfers.   Instruct in proper use of assistive devices.              Learning Progress Summary           Patient Acceptance, E,TB,D, VU,DU,NR by  at 10/10/2022 1325    Comment: LBD techniques to assist with scrotal edema and discomfort to progress activity tolerance    Acceptance, E,TB,D, VU,DU by KF at 10/4/2022 1512   Family Acceptance, E,TB,D, VU,DU,NR by  at 10/10/2022 1325    Comment: LBD techniques to assist with scrotal edema and discomfort to progress activity tolerance                   Point: Home exercise program (Not Started)      Description:   Instruct learner(s) on appropriate technique for monitoring, assisting and/or progressing therapeutic exercises/activities.              Learner Progress:  Not documented in this visit.          Point: Precautions (Done)     Description:   Instruct learner(s) on prescribed precautions during self-care and functional transfers.              Learning Progress Summary           Patient Acceptance, E,TB,D, VU,DU,NR by  at 10/10/2022 1325    Comment: LBD techniques to assist with scrotal edema and discomfort to progress activity tolerance    Acceptance, E,TB,D, VU,DU by  at 10/4/2022 1512   Family Acceptance, E,TB,D, VU,DU,NR by KF at 10/10/2022 1325    Comment: LBD techniques to assist with scrotal edema and discomfort to progress activity tolerance                   Point: Body mechanics (Done)     Description:   Instruct learner(s) on proper positioning and spine alignment during self-care, functional mobility activities and/or exercises.              Learning Progress Summary           Patient Acceptance, E,TB,D, VU,DU,NR by  at 10/10/2022 1325    Comment: LBD techniques to assist with scrotal edema and discomfort to progress activity tolerance    Acceptance, E,TB,D, VU,DU by  at 10/4/2022 1512   Family Acceptance, E,TB,D, VU,DU,NR by  at 10/10/2022 1325    Comment: LBD techniques to assist with scrotal edema and discomfort to progress activity tolerance                               User Key     Initials Effective Dates Name Provider Type Discipline     06/16/21 -  Mercedez Navas OT Occupational Therapist OT              OT Recommendation and Plan  Planned Therapy Interventions (OT): activity tolerance training, adaptive equipment training, BADL retraining, occupation/activity based interventions, strengthening exercise, edema control/reduction, functional balance retraining, transfer/mobility retraining, ROM/therapeutic exercise, patient/caregiver education/training  Therapy  Frequency (OT): daily  Plan of Care Review  Plan of Care Reviewed With: patient  Progress: improving  Outcome Evaluation: OT re-eval completed, Pt demonstrates deficits with standing tolerance, balance, and AROM for ADL completion compared to baseline at this time, depA LBD, Tristan UBD, setup self-feeding, recom IPOT d/c IRF     Time Calculation:    Time Calculation- OT     Row Name 10/10/22 1156             Time Calculation- OT    OT Start Time 1156  -KF      OT Received On 10/10/22  -KF      OT Goal Re-Cert Due Date 10/20/22  -KF         Timed Charges    66536 - OT Therapeutic Activity Minutes 4  -KF      01899 - OT Self Care/Mgmt Minutes 5  -KF         Untimed Charges    OT Eval/Re-eval Minutes 18  -KF         Total Minutes    Timed Charges Total Minutes 9  -KF      Untimed Charges Total Minutes 18  -KF       Total Minutes 27  -KF            User Key  (r) = Recorded By, (t) = Taken By, (c) = Cosigned By    Initials Name Provider Type    KF Mercedez Navas OT Occupational Therapist              Therapy Charges for Today     Code Description Service Date Service Provider Modifiers Qty    [id number]  OT SELF CARE/MGMT/TRAIN EA 15 MIN 10/10/2022 Mercedez Navas OT GO 1    [id number]  OT RE-EVAL 2 10/10/2022 Mercedez Navas OT GO 1               Mercedez Navas OT  10/10/2022

## 2022-10-10 NOTE — PLAN OF CARE
Goal Outcome Evaluation:  Plan of Care Reviewed With: patient        Progress: improving  Outcome Evaluation: OT re-eval completed, Pt demonstrates deficits with standing tolerance, balance, and AROM for ADL completion compared to baseline at this time, depA LBD, Tristan UBD, setup self-feeding, recom IPOT d/c IRF

## 2022-10-10 NOTE — CASE MANAGEMENT/SOCIAL WORK
Continued Stay Note   [place]     Patient Name: Jermaine Singh  MRN: [id number]  Today's Date: 10/10/2022    Admit Date: 10/2/2022    Plan: CM update- [facility]   Discharge Plan     Row Name 10/10/22 1508       Plan    Plan CM update- [facility]    Plan Comments Patient has decided along with recommendations from PT that he would be agreeable to short term rehab at [facility] - PT and OT have now evaluated and Ally with [facility] has been given his referral and it has been sent to his insurance for approval - CM will continue to follow- yariel [phone]    Final Discharge Disposition Code 03 - skilled nursing facility (SNF)               Discharge Codes    No documentation.                     Yariel Crenshaw RN

## 2022-10-10 NOTE — PROGRESS NOTES
[facility] Medicine Services  PROGRESS NOTE    Patient Name: Jermaine Singh  : 1945  MRN: [id number]    Date of Admission: 10/2/2022  Primary Care Physician: Farooq Painter MD    Subjective     CC: f/u a/c CHF     HPI:  Sitting in chair, no family at bedside. Says he is cold. Had a good night's sleep. Feels that lower extremity edema and scrotal edema are improved. He hopes to go to rehab soon. .     ROS:  Gen- No fevers, chills  CV- No chest pain, palpitations  Resp- No cough, dyspnea  GI- No N/V or abdominal pain  - scrotal edema improving     Objective     Vital Signs:   Temp:  [97.5 °F (36.4 °C)-98.6 °F (37 °C)] 97.5 °F (36.4 °C)  Heart Rate:  [62-71] 70  Resp:  [16-18] 18  BP: (112-148)/(64-95) 121/77  Flow (L/min):  [2] 2     Physical Exam:  Constitutional: No acute distress, awake, alert and conversant. Sitting up in chair   HENT: NCAT, mucous membranes moist  Respiratory: Clear to auscultation bilaterally, respiratory effort normal on room air   Cardiovascular: RRR, no murmurs, rubs, or gallops  Gastrointestinal: Positive bowel sounds, soft, nontender, nondistended  : Scrotal exam deferred   Musculoskeletal: Trace to 1+ BLE edema. RLE mild erythema / warmth to touch. L foot dressed and in offloading shoe - did not remove for exam   Psychiatric: Appropriate affect, cooperative  Neurologic: Oriented x 3, moves all extremities spontaneously without focal deficits, speech clear    Results Reviewed:  LAB RESULTS:      Lab 10/08/22  0655 10/06/22  0716 10/05/22  0844 10/04/22  0925   WBC 5.33 4.94 5.16 5.65   HEMOGLOBIN 11.1* 10.8* 11.0* 10.4*   HEMATOCRIT 34.4* 34.3* 35.9* 32.9*   PLATELETS 169 152 154 153   NEUTROS ABS 3.30 2.94  --   --    IMMATURE GRANS (ABS) 0.01 0.01  --   --    LYMPHS ABS 1.15 1.12  --   --    MONOS ABS 0.56 0.55  --   --    EOS ABS 0.25 0.27  --   --    MCV 91.2 93.0 93.7 93.2         Lab 10/09/22  1014 10/08/22  0655 10/07/22  0638 10/06/22  0716  10/05/22  0845   SODIUM 138 137 136 140 137   POTASSIUM 4.4 4.4 4.9 4.7 4.8   CHLORIDE 103 102 101 102 99   CO2 25.0 25.0 23.0 27.0 26.0   ANION GAP 10.0 10.0 12.0 11.0 12.0   BUN 24* 24* 26* 29* 31*   CREATININE 1.61* 1.77* 1.70* 1.86* 2.04*   EGFR 43.8* 39.1* 41.0* 36.8* 32.9*   GLUCOSE 140* 141* 147* 85 117*   CALCIUM 8.8 8.8 9.0 9.0 9.1         Lab 10/08/22  0655 10/06/22  0716 10/05/22  0845 10/04/22  0925   TOTAL PROTEIN 5.4* 5.6* 6.0 5.8*   ALBUMIN 3.30* 3.60 3.80 3.70   GLOBULIN 2.1 2.0 2.2 2.1   ALT (SGPT) 13 11 13 13   AST (SGOT) 11 7 9 10   BILIRUBIN 0.2 0.2 0.3 0.2   ALK PHOS 62 68 78 79         Lab 10/03/22  1044   TROPONIN T 0.132*     Brief Urine Lab Results  (Last result in the past 365 days)      Color   Clarity   Blood   Leuk Est   Nitrite   Protein   CREAT   Urine HCG        08/23/22 1309             59.2         08/23/22 1309 [place]   Cloudy   Large (3+)   Trace   Negative   30 mg/dL (1+)               Microbiology Results Abnormal     Procedure Component Value - Date/Time    Blood Culture - Blood, Arm, Left [[id number]]  (Normal) Collected: 10/02/22 0433    Lab Status: Final result Specimen: Blood from Arm, Left Updated: 10/07/22 0647     Blood Culture No growth at 5 days    Blood Culture - Blood, Arm, Left [[id number]]  (Normal) Collected: 10/02/22 0433    Lab Status: Final result Specimen: Blood from Arm, Left Updated: 10/07/22 0647     Blood Culture No growth at 5 days        No radiology results from the last 24 hrs    Results for orders placed during the hospital encounter of 07/27/22    Adult Transthoracic Echo Complete W/ Cont if Necessary Per Protocol    Interpretation Summary  · Left ventricular ejection fraction appears to be 46 - 50%. Left ventricular systolic function is low normal.  · Left ventricular septal hypokinesis  · No significant valvular heart disease    I have reviewed the medications:  Scheduled Meds:apixaban, 5 mg, Oral, Q12H  aspirin, 81 mg, Oral, Every Other  Day  atorvastatin, 20 mg, Oral, Nightly  bethanechol, 10 mg, Oral, TID  bumetanide, 1 mg, Oral, Daily  carvedilol, 3.125 mg, Oral, BID With Meals  doxycycline, 100 mg, Oral, Q12H  famotidine, 20 mg, Oral, Daily  insulin detemir, 8 Units, Subcutaneous, Nightly  insulin lispro, 0-9 Units, Subcutaneous, TID AC  levothyroxine, 75 mcg, Oral, Q AM  pharmacy consult - [place], , Does not apply, Daily  saccharomyces boulardii, 250 mg, Oral, BID  sodium chloride, 10 mL, Intravenous, Q12H  sodium chloride, 10 mL, Intravenous, Q12H      Continuous Infusions:   PRN Meds:.•  acetaminophen **OR** acetaminophen **OR** acetaminophen  •  senna-docusate sodium **AND** polyethylene glycol **AND** bisacodyl **AND** bisacodyl  •  dextrose  •  dextrose  •  Diclofenac Sodium  •  glucagon (human recombinant)  •  HYDROcodone-acetaminophen  •  influenza vaccine  •  ipratropium-albuterol  •  magnesium sulfate **OR** magnesium sulfate in D5W 1g/100mL (PREMIX)  •  nitroglycerin  •  ondansetron **OR** ondansetron  •  sodium chloride  •  sodium chloride  •  sodium chloride  •  sodium chloride    Assessment & Plan     Active Hospital Problems    Diagnosis  POA   • Elevated troponin [R77.8]  Yes   • Cellulitis of right lower extremity [L03.115]  Yes   • [place] (acute kidney injury) ([facility]) [N17.9]  Yes   • Acute on chronic HFrEF (heart failure with reduced ejection fraction) ([facility]) [I50.23]  Yes   • SSS (sick sinus syndrome) ([facility]) [I49.5]  Yes   • Type 2 diabetes mellitus ([facility]) [E11.9]  Yes   • Hypertension [I10]  Yes   • Hyperlipidemia [E78.5]  Yes   • Hypothyroidism (acquired) [E03.9]  Yes   • S/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 Au [Z95.1]  Not Applicable      Resolved Hospital Problems    Diagnosis Date Resolved POA   • Angina at rest ([facility]) [I20.8] 10/02/2022 Yes     Brief Hospital Course to date:  Jermaine Singh is a 77 y.o. male with PMH significant for HTN, HLD, CAD (s/p CABG x 3 in 2019), SSS s/p PPM, PVD (s/p RLE angioplasty 7/2021 and L great toe  amputation 8/2/2022), CKD III and chronic systolic CHF. Recent hospitalization at [facility] 7/27-8/26/22 for L 2ndt toe osteomyelitis s/p amputation with subsequent A[place]A and acute hypoxic respiratory failure secondary to systolic CHF exacerbation. Also found to have bilateral DVTs during this admission. He discharged to [facility] for rehab.     He returned to [facility] 10/2/22 for evaluation of shortness of breath and lower extremity edema. Also RLE cellulitis (PCP started on Bactrim outpatient without improvement. He was admitted to the hospital medicine service for acute on chronic systolic CHF exacerbation and RLE cellulitis.     Hypoxia  Acute on chronic systolic CHF   Right pleural effusion  Elevated troponin  - Hypoxic in ED. Required 4L NC to maintain O2 saturations. Has weaned to room air   - Appreciate cardiology assistance  - Elevated troponin felt to be due to CHF in the setting of renal failure. No chest pain / EKG changes.   - 10/3 Lexiscan stress test showed anteroseptal scar with no significant ischemia. LVEF 42%  - PPM interrogated this admission  - s/p IV diuresis. Now on Bumex 1mg PO daily   - Continue Carvedilol. No ACEI / ARB / ARNI or aldactone due to renal dysfunction     Acute urinary retention  - Likely due to severe scrotal edema in the setting of a/c CHF  - Continue scrotal support  - Patient refused catheter. He reported to me that he is voiding spontaneously    [place]   - Multifactorial in the setting of Bactrim use and a/c CHF  - New baseline creatinine following recent admission appears to be ~ 1.6   - Creatinine peaked at 2.18 on 10/2, improved to 1.61 on 10/9    RLE cellulitis  PVD with recent L great toe amputation by Dr. Márquez  - Failed outpatient treatment with Bactrim  - Appreciate Dr. Márquez / ID assistance  - May need L 2nd toe amputation due to exposed middle phalangeal bone / middle phalangeal joint space from hammertoe phenomena   - For now, WBAT with  offloading shoe  - Now on PO Doxycycline per ID x 1 month  - Continue probiotic  - Follow up with Dr. Braga in 1 month    Insulin-dependent DMII  - Hgb A1c 7.2%   - Continue Levemir 8 units QHS  - Patient refused scheduled Lispro yesterday. Will hold today, continue SSI TID AC PRN for glucose > 150      Depression / anxiety  - My partner started Sertraline 25mg QHS on 10/6 - patient now believes this medication is contributing to poor sleep and he does not want to continue taking it. Stopped on 10/9   - Could consider Mirtazapine QHS    Generalized weakness  - PT/OT following. He is requiring assist x 2 - recommend rehab  - Patient interested in [facility].     Expected Discharge Location and Transportation: rehab   Expected Discharge Date: 10/11/22     DVT prophylaxis:Medical DVT prophylaxis orders are present.     AM-PAC 6 Clicks Score (PT): 19 (10/09/22 6964)    CODE STATUS:   Code Status and Medical Interventions:   Ordered at: 10/02/22 0949     Code Status (Patient has no pulse and is not breathing):    CPR (Attempt to Resuscitate)     Medical Interventions (Patient has pulse or is breathing):    Full Support     Melissa Nguyen PA-C  10/10/22

## 2022-10-10 NOTE — PLAN OF CARE
Goal Outcome Evaluation:         No significant events overnight. VSS. Is on 2LNC due to desat while asleep on RA. Call light within reach. Fall precautions in place. Will continue to monitor.

## 2022-10-10 NOTE — PROGRESS NOTES
"                                 [facility] Progress Note      LOS: 8 days   Patient Care Team:  Farooq Painter MD as PCP - General (Family Medicine)  Jermaine Hernandez MD as Consulting Physician (Cardiology)    Chief Complaint:  Following for Acute CHF    Subjective   Sitting up in bed, got up with walker to bedside chair while I was in room.  Feels much better - wants to go to [facility].  Ambulating more, feels a lot better.  Still with a lot of scrotal edema.  Now on oral antibiotics.      Review of Systems:   A 14 point review of systems was negative except as was stated in the HPI      Objective     Vital Sign Min/Max for last 24 hours  Temp  Min: 97.5 °F (36.4 °C)  Max: 98.6 °F (37 °C)   BP  Min: 112/64  Max: 148/92   Pulse  Min: 62  Max: 71   Resp  Min: 16  Max: 18   SpO2  Min: 81 %  Max: 100 %   Flow (L/min)  Min: 2  Max: 2   Weight  Min: 110 kg (242 lb 14.4 oz)  Max: 110 kg (242 lb 14.4 oz)     Flowsheet Rows    Flowsheet Row First Filed Value   Admission Height 190.5 cm (75\") Documented at 10/02/2022 0306   Admission Weight 103 kg (227 lb) Documented at 10/02/2022 0306          Physical Exam:  General Appearance: Alert, appears stated age and cooperative  Lungs: Clear to auscultation  Heart:: RRR   No Murmurs, Rubs or Gallops  Abdomen: Soft and nontender with adequate bowel sounds.  No organomegaly  Extremities: Left foot bandaged.  Erythematous cellulitic changes right leg  Pulses: Pulses palpable and equal bilaterally  Skin: Warm and dry with no rash  Psych: Normal     Results Review:     I reviewed the patient's new clinical results.  Results from last 7 days   Lab Units 10/09/22  1014 10/08/22  0655 10/07/22  0638   SODIUM mmol/L 138 137 136   POTASSIUM mmol/L 4.4 4.4 4.9   CHLORIDE mmol/L 103 102 101   CO2 mmol/L 25.0 25.0 23.0   BUN mg/dL 24* 24* 26*   CREATININE mg/dL 1.61* 1.77* 1.70*   GLUCOSE mg/dL 140* 141* 147*   CALCIUM mg/dL 8.8 8.8 9.0     Results from last 7 days   Lab Units " 10/08/22  0655 10/06/22  0716 10/05/22  0844   WBC 10*3/mm3 5.33 4.94 5.16   HEMOGLOBIN g/dL 11.1* 10.8* 11.0*   HEMATOCRIT % 34.4* 34.3* 35.9*   PLATELETS 10*3/mm3 169 152 154     Lab Results   Lab Value Date/Time    TROPONINT 0.132 (C) 10/03/2022 1044    TROPONINT 0.151 (C) 10/02/2022 2205    TROPONINT 0.129 (C) 10/02/2022 2020    TROPONINT 0.107 (C) 10/02/2022 1435    TROPONINT 0.129 (C) 10/02/2022 0839    TROPONINT 0.139 (C) 10/02/2022 0332    TROPONINT 0.230 (C) 08/13/2022 2310    TROPONINT 0.229 (C) 08/13/2022 1224    TROPONINT 0.230 (C) 08/13/2022 0730    TROPONINT 0.206 (C) 08/12/2022 1653    TROPONINT 0.157 (C) 08/12/2022 1025    TROPONINT 0.146 (C) 08/11/2022 0636    TROPONINT 0.129 (C) 08/10/2022 2239    TROPONINT 0.108 (C) 08/10/2022 1638    TROPONINT 0.013 10/20/2021 1705    TROPONINT <0.010 10/20/2021 1508                       Medication Review: yes  Current Facility-Administered Medications   Medication Dose Route Frequency Provider Last Rate Last Admin   • acetaminophen (TYLENOL) tablet 650 mg  650 mg Oral Q4H PRN Renetta Gatica II, DO   650 mg at 10/07/22 0124    Or   • acetaminophen (TYLENOL) 160 MG/5ML solution 650 mg  650 mg Oral Q4H PRN Renetta Gatica II, DO        Or   • acetaminophen (TYLENOL) suppository 650 mg  650 mg Rectal Q4H PRN Renetta Gatica II, DO       • apixaban (ELIQUIS) tablet 5 mg  5 mg Oral Q12H Jacinta Daniel DO   5 mg at 10/10/22 0819   • aspirin chewable tablet 81 mg  81 mg Oral Every Other Day Jenna Campbell MD   81 mg at 10/08/22 0859   • atorvastatin (LIPITOR) tablet 20 mg  20 mg Oral Nightly Sarah Pineda APRN   20 mg at 10/09/22 2010   • bethanechol (URECHOLINE) tablet 10 mg  10 mg Oral TID Renetta Gatica II, DO   10 mg at 10/10/22 0826   • sennosides-docusate (PERICOLACE) 8.6-50 MG per tablet 2 tablet  2 tablet Oral BID PRN Melissa Nguyen PA-C        And   • polyethylene glycol (MIRALAX) packet 17 g  17 g Oral Daily PRN Melissa Nguyen PA-C         And   • bisacodyl (DULCOLAX) EC tablet 5 mg  5 mg Oral Daily PRN Melissa Nguyen PA-C        And   • bisacodyl (DULCOLAX) suppository 10 mg  10 mg Rectal Daily PRN Melissa Nguyen PA-C       • bumetanide (BUMEX) tablet 1 mg  1 mg Oral Daily Shabnam Lagos APRN   1 mg at 10/10/22 0819   • carvedilol (COREG) tablet 3.125 mg  3.125 mg Oral BID With Meals Renetta Gatica II, DO   3.125 mg at 10/10/22 0819   • dextrose (D50W) (25 g/50 mL) IV injection 25 g  25 g Intravenous Q15 Min PRN Renetta Gatica II, DO       • dextrose (GLUTOSE) oral gel 15 g  15 g Oral Q15 Min PRN Renetta Gatica II, DO       • Diclofenac Sodium (VOLTAREN) 1 % gel 2 g  2 g Topical 4x Daily PRN Renetta Gatica II, DO       • doxycycline (MONODOX) capsule 100 mg  100 mg Oral Q12H Jenna Campbell MD   100 mg at 10/10/22 0819   • famotidine (PEPCID) tablet 20 mg  20 mg Oral Daily Renetta Gatica II, DO   20 mg at 10/10/22 0820   • glucagon (human recombinant) (GLUCAGEN DIAGNOSTIC) injection 1 mg  1 mg Intramuscular Q15 Min PRN Renetta Gatica II, DO       • HYDROcodone-acetaminophen (NORCO) 5-325 MG per tablet 1 tablet  1 tablet Oral Q4H PRN Renetta Gatica II, DO   1 tablet at 10/04/22 0137   • Influenza Vac High-Dose Quad (FLUZONE HIGH DOSE) injection 0.7 mL  0.7 mL Intramuscular During Hospitalization Renetta Gatica II, DO       • insulin detemir (LEVEMIR) injection 8 Units  8 Units Subcutaneous Nightly Melissa Nguyen PA-C   8 Units at 10/09/22 2011   • Insulin Lispro (humaLOG) injection 0-9 Units  0-9 Units Subcutaneous TID AC Renetta Gatica II, DO   2 Units at 10/10/22 0820   • ipratropium-albuterol (DUO-NEB) nebulizer solution 3 mL  3 mL Nebulization Q4H PRN Renetta Gatica II, DO       • levothyroxine (SYNTHROID, LEVOTHROID) tablet 75 mcg  75 mcg Oral Q AM Renetta Gatica II, DO   75 mcg at 10/10/22 0627   • Magnesium Sulfate 2 gram infusion - Mg less than or equal to 1.5 mg/dL  2 g Intravenous PRN  Sarah Pineda APRN        Or   • Magnesium Sulfate 1 gram infusion - Mg 1.6-1.9 mg/dL  1 g Intravenous PRN Sarah Pineda APRN       • nitroglycerin (NITROSTAT) SL tablet 0.4 mg  0.4 mg Sublingual Q5 Min PRN Sarah Pineda APRN       • ondansetron (ZOFRAN) tablet 4 mg  4 mg Oral Q6H PRN Renetta Gatica M II, DO        Or   • ondansetron (ZOFRAN) injection 4 mg  4 mg Intravenous Q6H PRN Renetta Gatica M II, DO   4 mg at 10/02/22 1509   • Pharmacy Consult - MTM   Does not apply Daily Mya Han, PharmD       • saccharomyces boulardii (FLORASTOR) capsule 250 mg  250 mg Oral BID Melissa Nguyen PA-C   250 mg at 10/10/22 0819   • sodium chloride 0.9 % flush 10 mL  10 mL Intravenous PRN Sarah Pineda APRN       • sodium chloride 0.9 % flush 10 mL  10 mL Intravenous Q12H Sarah Pineda APRN   10 mL at 10/09/22 2011   • sodium chloride 0.9 % flush 10 mL  10 mL Intravenous PRN Sarah Pineda APRN       • sodium chloride 0.9 % flush 10 mL  10 mL Intravenous Q12H EnAkicy M II, DO   10 mL at 10/09/22 2010   • sodium chloride 0.9 % flush 10 mL  10 mL Intravenous PRN En, Renetta M II, DO       • sodium chloride nasal spray 1 spray  1 spray Nasal PRN Renetta Gatica II, DO         Facility-Administered Medications Ordered in Other Encounters   Medication Dose Route Frequency Provider Last Rate Last Admin   • Chlorhexidine Gluconate Cloth 2 % pads 1 application  1 application Topical Q12H PRN Mohan Arauz PA             Type 2 diabetes mellitus (HCC)    Hypertension    Hyperlipidemia    Hypothyroidism (acquired)    S/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 Au    SSS (sick sinus syndrome) ([facility])    [place] (acute kidney injury) ([facility])    Acute on chronic HFrEF (heart failure with reduced ejection fraction) ([facility])    Elevated troponin    Cellulitis of right lower extremity        Impression      -  Systolic heart failure with subacute decompensation--improved  -  Coronary artery disease with previous CABG  February 2019  -  Nonspecific troponin evaluation with no classic rise and fall  -  Sick sinus syndrome [employer] pacemaker  -  Peripheral vascular disease status post amputation left great toe August 2022  -  History of bilateral lower extremity DVT August 2022  -  Chronic kidney disease--stable  -  Hypertension  -  Lexiscan reveals anteroseptal scar with no significant ischemia.  LVEF 42%    Plan     -  Continue present medications.  Will defer ACE ARB and Entresto for the time being secondary to kidney disease  -  Care previously discussed with Dr. Márquez.  Although he presented with some decompensation of heart failure, he is currently stable and his nuclear scan this admission was acceptable.  Therefore I think he is a reasonable risk for general anesthesia for amputation of the second toe on the left foot if needed.  I discussed the timing of this with Dr. Márquez who thinks it could be done electively when the patient is more mentally and physically up to the task..    -  Transitioned to oral antibiotics.  Hopefully to outpatient rehab soon.    Megan Shukla PA-C working with Cooper Apodaca MD   10/10/22  09:17 EDT

## 2022-10-10 NOTE — PROGRESS NOTES
Cardiothoracic Surgery Progress Note      POD #: Left great toe amputation August 2, 2022     LOS: 8 days      Subjective: Asleep this morning I did not awaken him    Objective:  Vital Signs vital signs below noted T-max past 24 hours 90.6 °F  Temp:  [97.4 °F (36.3 °C)-98.6 °F (37 °C)] 98.3 °F (36.8 °C)  Heart Rate:  [62-71] 62  Resp:  [16-18] 16  BP: (112-148)/(64-95) 112/64    Physical Exam:   General Appearance: Asleep resting comfortably vital signs on monitor stable   Lungs:   Heart:   Skin:   Incision: Amputation site incision dressing changes along with the left second toe ulcerative area middle phalangeal joint space.  Painted with Betadine and then cover with 6 x 6 Optifoam     Results:  Results from last 7 days   Lab Units 10/08/22  0655   WBC 10*3/mm3 5.33   HEMOGLOBIN g/dL 11.1*   HEMATOCRIT % 34.4*   PLATELETS 10*3/mm3 169     Results from last 7 days   Lab Units 10/09/22  1014   SODIUM mmol/L 138   POTASSIUM mmol/L 4.4   CHLORIDE mmol/L 103   CO2 mmol/L 25.0   BUN mg/dL 24*   CREATININE mg/dL 1.61*   GLUCOSE mg/dL 140*   CALCIUM mg/dL 8.8         Assessment: #1.  Postop left great toe transmetatarsal amputation and primary closure August 2, 2022 for ulceration/osteomyelitis.  Some dehiscence of mid distal portion of the incision very superficial  2.  Exposed middle phalangeal joint space left second toe from hammertoe phenomena.  Will eventually need amputation.  3.  Ischemic cardiomyopathy and heart failure  4.  Status post remote coronary artery stenting.      Plan: Overall medical manage per hospitalist.  Antibiotics per infectious disease.  We will have nurses do daily dressing changes to this prior amputation site and the second toe ulcerative site.      Gopal Márquez MD - 10/10/22 - 05:53 EDT

## 2022-10-11 VITALS
TEMPERATURE: 97.7 F | WEIGHT: 244 LBS | HEIGHT: 75 IN | DIASTOLIC BLOOD PRESSURE: 75 MMHG | BODY MASS INDEX: 30.34 KG/M2 | HEART RATE: 65 BPM | SYSTOLIC BLOOD PRESSURE: 136 MMHG | OXYGEN SATURATION: 100 % | RESPIRATION RATE: 16 BRPM

## 2022-10-11 LAB
ANION GAP SERPL CALCULATED.3IONS-SCNC: 9 MMOL/L (ref 5–15)
BUN SERPL-MCNC: 27 MG/DL (ref 8–23)
BUN/CREAT SERPL: 18.9 (ref 7–25)
CALCIUM SPEC-SCNC: 9.1 MG/DL (ref 8.6–10.5)
CHLORIDE SERPL-SCNC: 102 MMOL/L (ref 98–107)
CO2 SERPL-SCNC: 29 MMOL/L (ref 22–29)
CREAT SERPL-MCNC: 1.43 MG/DL (ref 0.76–1.27)
EGFRCR SERPLBLD CKD-EPI 2021: 50.5 ML/MIN/1.73
GLUCOSE BLDC GLUCOMTR-MCNC: 129 MG/DL (ref 70–130)
GLUCOSE BLDC GLUCOMTR-MCNC: 185 MG/DL (ref 70–130)
GLUCOSE SERPL-MCNC: 133 MG/DL (ref 65–99)
POTASSIUM SERPL-SCNC: 4.7 MMOL/L (ref 3.5–5.2)
SODIUM SERPL-SCNC: 140 MMOL/L (ref 136–145)

## 2022-10-11 PROCEDURE — 82962 GLUCOSE BLOOD TEST: CPT

## 2022-10-11 PROCEDURE — 63710000001 INSULIN LISPRO (HUMAN) PER 5 UNITS: Performed by: INTERNAL MEDICINE

## 2022-10-11 PROCEDURE — 97530 THERAPEUTIC ACTIVITIES: CPT

## 2022-10-11 PROCEDURE — 99239 HOSP IP/OBS DSCHRG MGMT >30: CPT | Performed by: NURSE PRACTITIONER

## 2022-10-11 PROCEDURE — 80048 BASIC METABOLIC PNL TOTAL CA: CPT | Performed by: PHYSICIAN ASSISTANT

## 2022-10-11 RX ORDER — ECHINACEA PURPUREA EXTRACT 125 MG
1 TABLET ORAL AS NEEDED
Refills: 12
Start: 2022-10-11 | End: 2022-10-28

## 2022-10-11 RX ORDER — INSULIN LISPRO 100 [IU]/ML
0-9 INJECTION, SOLUTION INTRAVENOUS; SUBCUTANEOUS
Refills: 12
Start: 2022-10-11 | End: 2022-10-28

## 2022-10-11 RX ORDER — DOCUSATE SODIUM 100 MG/1
100 CAPSULE, LIQUID FILLED ORAL 2 TIMES DAILY
Status: DISCONTINUED | OUTPATIENT
Start: 2022-10-11 | End: 2022-10-11 | Stop reason: HOSPADM

## 2022-10-11 RX ORDER — BUMETANIDE 1 MG/1
1 TABLET ORAL DAILY
Start: 2022-10-12 | End: 2022-11-20 | Stop reason: HOSPADM

## 2022-10-11 RX ORDER — SACCHAROMYCES BOULARDII 250 MG
250 CAPSULE ORAL 2 TIMES DAILY
Qty: 56 CAPSULE | Refills: 0
Start: 2022-10-11 | End: 2022-11-08

## 2022-10-11 RX ORDER — HYDROCODONE BITARTRATE AND ACETAMINOPHEN 5; 325 MG/1; MG/1
1 TABLET ORAL EVERY 4 HOURS PRN
Refills: 0
Start: 2022-10-11 | End: 2022-10-28

## 2022-10-11 RX ORDER — IPRATROPIUM BROMIDE AND ALBUTEROL SULFATE 2.5; .5 MG/3ML; MG/3ML
3 SOLUTION RESPIRATORY (INHALATION) EVERY 4 HOURS PRN
Qty: 360 ML
Start: 2022-10-11 | End: 2022-10-28

## 2022-10-11 RX ORDER — LEVOTHYROXINE SODIUM 0.07 MG/1
75 TABLET ORAL EVERY MORNING
Start: 2022-10-11 | End: 2022-11-20 | Stop reason: HOSPADM

## 2022-10-11 RX ORDER — BETHANECHOL CHLORIDE 5 MG
10 TABLET ORAL 3 TIMES DAILY
Qty: 180 TABLET | Refills: 0 | Status: SHIPPED | OUTPATIENT
Start: 2022-10-11 | End: 2022-11-07 | Stop reason: DRUGHIGH

## 2022-10-11 RX ORDER — NITROGLYCERIN 0.4 MG/1
0.4 TABLET SUBLINGUAL
Refills: 12
Start: 2022-10-11 | End: 2022-11-22 | Stop reason: SDUPTHER

## 2022-10-11 RX ORDER — DOXYCYCLINE 100 MG/1
100 CAPSULE ORAL EVERY 12 HOURS SCHEDULED
Qty: 4 CAPSULE | Refills: 0 | Status: SHIPPED | OUTPATIENT
Start: 2022-11-03 | End: 2022-11-05

## 2022-10-11 RX ORDER — FAMOTIDINE 20 MG/1
20 TABLET, FILM COATED ORAL DAILY
Start: 2022-10-11 | End: 2022-11-22 | Stop reason: SDUPTHER

## 2022-10-11 RX ORDER — CARVEDILOL 3.12 MG/1
3.12 TABLET ORAL 2 TIMES DAILY WITH MEALS
Start: 2022-10-11

## 2022-10-11 RX ORDER — PSEUDOEPHEDRINE HCL 30 MG
100 TABLET ORAL 2 TIMES DAILY
Start: 2022-10-11 | End: 2022-11-20 | Stop reason: HOSPADM

## 2022-10-11 RX ORDER — ATORVASTATIN CALCIUM 20 MG/1
20 TABLET, FILM COATED ORAL NIGHTLY
Qty: 90 TABLET
Start: 2022-10-11 | End: 2022-11-22 | Stop reason: SDUPTHER

## 2022-10-11 RX ADMIN — DOCUSATE SODIUM 100 MG: 100 CAPSULE, LIQUID FILLED ORAL at 10:54

## 2022-10-11 RX ADMIN — LEVOTHYROXINE SODIUM 75 MCG: 0.07 TABLET ORAL at 05:38

## 2022-10-11 RX ADMIN — Medication 10 ML: at 08:06

## 2022-10-11 RX ADMIN — BETHANECHOL CHLORIDE 10 MG: 10 TABLET ORAL at 08:05

## 2022-10-11 RX ADMIN — INSULIN LISPRO 2 UNITS: 100 INJECTION, SOLUTION INTRAVENOUS; SUBCUTANEOUS at 11:05

## 2022-10-11 RX ADMIN — Medication 250 MG: at 08:06

## 2022-10-11 RX ADMIN — BUMETANIDE 1 MG: 1 TABLET ORAL at 08:06

## 2022-10-11 RX ADMIN — FAMOTIDINE 20 MG: 20 TABLET ORAL at 08:06

## 2022-10-11 RX ADMIN — APIXABAN 5 MG: 5 TABLET, FILM COATED ORAL at 08:05

## 2022-10-11 RX ADMIN — DOXYCYCLINE 100 MG: 100 CAPSULE ORAL at 08:05

## 2022-10-11 RX ADMIN — CARVEDILOL 3.12 MG: 3.12 TABLET, FILM COATED ORAL at 08:05

## 2022-10-11 NOTE — PLAN OF CARE
Goal Outcome Evaluation:  Plan of Care Reviewed With: patient, spouse        Progress: improving  Outcome Evaluation: patient was able to increase ambulation to 100 ft with walker and CGA and no rest break required today patient is progressing toward goals as expected we will continue to progress activity until D/C to rehab facility.

## 2022-10-11 NOTE — CASE MANAGEMENT/SOCIAL WORK
Case Management Discharge Note      Final Note: Patient has been accepted to [facility] for inpatient rehab. He will admit to their GRU unit. Report to be called to [phone]. The [facility] medical van can transport at 2:30 PM today. [facility] . Patient and spouse aware of dc plans and all in agreement. No other dc needs identified- yariel [phone]         Selected Continued Care - Admitted Since 10/2/2022     Destination    No services have been selected for the patient.              Durable Medical Equipment    No services have been selected for the patient.              Dialysis/Infusion    No services have been selected for the patient.              Home Medical Care    No services have been selected for the patient.              Therapy    No services have been selected for the patient.              Community Resources    No services have been selected for the patient.              Community & DME    No services have been selected for the patient.                Selected Continued Care - Prior Encounters Includes continued care and service providers with selected services from prior encounters from 7/4/2022 to 10/11/2022    Discharged on 8/26/2022 Admission date: 7/27/2022 - Discharge disposition: Rehab Facility or Unit (DC - External)    Destination     Service Provider Selected Services Address Phone Fax Patient Preferred    [facility] [address] [id number] [phone] --                         Final Discharge Disposition Code: 62 - inpatient rehab facility

## 2022-10-11 NOTE — PROGRESS NOTES
"                                 [facility] Progress Note      LOS: 9 days   Patient Care Team:  Farooq Painter MD as PCP - General (Family Medicine)  Jermaine Hernandez MD as Consulting Physician (Cardiology)    Chief Complaint:  Following for Acute CHF    Subjective   Sitting up in chair.  Feeling good today.  Continues to walk with walker/assitance.  Still with significant scrotal edema.        Review of Systems:   A 14 point review of systems was negative except as was stated in the HPI      Objective     Vital Sign Min/Max for last 24 hours  Temp  Min: 97.4 °F (36.3 °C)  Max: 98.7 °F (37.1 °C)   BP  Min: 105/69  Max: 130/79   Pulse  Min: 62  Max: 70   Resp  Min: 16  Max: 18   SpO2  Min: 97 %  Max: 100 %   Flow (L/min)  Min: 2  Max: 52   Weight  Min: 111 kg (244 lb)  Max: 111 kg (244 lb)     Flowsheet Rows    Flowsheet Row First Filed Value   Admission Height 190.5 cm (75\") Documented at 10/02/2022 0306   Admission Weight 103 kg (227 lb) Documented at 10/02/2022 0306          Physical Exam:  General Appearance: Alert, appears stated age and cooperative  Lungs: Clear to auscultation  Heart:: RRR   No Murmurs, Rubs or Gallops  Abdomen: Soft and nontender with adequate bowel sounds.  No organomegaly  Extremities: Left foot bandaged.  Erythematous cellulitic changes right leg  Pulses: Pulses palpable and equal bilaterally  Skin: Warm and dry with no rash  Psych: Normal     Results Review:     I reviewed the patient's new clinical results.  Results from last 7 days   Lab Units 10/11/22  0608 10/09/22  1014 10/08/22  0655   SODIUM mmol/L 140 138 137   POTASSIUM mmol/L 4.7 4.4 4.4   CHLORIDE mmol/L 102 103 102   CO2 mmol/L 29.0 25.0 25.0   BUN mg/dL 27* 24* 24*   CREATININE mg/dL 1.43* 1.61* 1.77*   GLUCOSE mg/dL 133* 140* 141*   CALCIUM mg/dL 9.1 8.8 8.8     Results from last 7 days   Lab Units 10/08/22  0655 10/06/22  0716 10/05/22  0844   WBC 10*3/mm3 5.33 4.94 5.16   HEMOGLOBIN g/dL 11.1* 10.8* " 11.0*   HEMATOCRIT % 34.4* 34.3* 35.9*   PLATELETS 10*3/mm3 169 152 154     Lab Results   Lab Value Date/Time    TROPONINT 0.132 (C) 10/03/2022 1044    TROPONINT 0.151 (C) 10/02/2022 2205    TROPONINT 0.129 (C) 10/02/2022 2020    TROPONINT 0.107 (C) 10/02/2022 1435    TROPONINT 0.129 (C) 10/02/2022 0839    TROPONINT 0.139 (C) 10/02/2022 0332    TROPONINT 0.230 (C) 08/13/2022 2310    TROPONINT 0.229 (C) 08/13/2022 1224    TROPONINT 0.230 (C) 08/13/2022 0730    TROPONINT 0.206 (C) 08/12/2022 1653    TROPONINT 0.157 (C) 08/12/2022 1025    TROPONINT 0.146 (C) 08/11/2022 0636    TROPONINT 0.129 (C) 08/10/2022 2239    TROPONINT 0.108 (C) 08/10/2022 1638    TROPONINT 0.013 10/20/2021 1705    TROPONINT <0.010 10/20/2021 1508                       Medication Review: yes  Current Facility-Administered Medications   Medication Dose Route Frequency Provider Last Rate Last Admin   • acetaminophen (TYLENOL) tablet 650 mg  650 mg Oral Q4H PRN Renetta Gatica II, DO   650 mg at 10/07/22 0124    Or   • acetaminophen (TYLENOL) 160 MG/5ML solution 650 mg  650 mg Oral Q4H PRN Renetta Gatica II, DO        Or   • acetaminophen (TYLENOL) suppository 650 mg  650 mg Rectal Q4H PRN Renetta Gatica II, DO       • apixaban (ELIQUIS) tablet 5 mg  5 mg Oral Q12H Jacinta Daniel DO   5 mg at 10/11/22 0805   • aspirin chewable tablet 81 mg  81 mg Oral Every Other Day Jenna Campbell MD   81 mg at 10/08/22 0859   • atorvastatin (LIPITOR) tablet 20 mg  20 mg Oral Nightly Sarah Pineda APRN   20 mg at 10/10/22 2010   • bethanechol (URECHOLINE) tablet 10 mg  10 mg Oral TID Renetta Gatica II, DO   10 mg at 10/11/22 0805   • sennosides-docusate (PERICOLACE) 8.6-50 MG per tablet 2 tablet  2 tablet Oral BID PRN Melissa Nguyen PA-C        And   • polyethylene glycol (MIRALAX) packet 17 g  17 g Oral Daily PRN Melissa Nguyen PA-C        And   • bisacodyl (DULCOLAX) EC tablet 5 mg  5 mg Oral Daily PRN Melissa Nguyen PA-C         And   • bisacodyl (DULCOLAX) suppository 10 mg  10 mg Rectal Daily PRN Melissa Nguyen PA-C       • bumetanide (BUMEX) tablet 1 mg  1 mg Oral Daily Shabnam Lagos APRN   1 mg at 10/11/22 0806   • carvedilol (COREG) tablet 3.125 mg  3.125 mg Oral BID With Meals Renetta Gatica II, DO   3.125 mg at 10/11/22 0805   • dextrose (D50W) (25 g/50 mL) IV injection 25 g  25 g Intravenous Q15 Min PRN Renetta Gatica II, DO       • dextrose (GLUTOSE) oral gel 15 g  15 g Oral Q15 Min PRN Renetta Gatica II, DO       • Diclofenac Sodium (VOLTAREN) 1 % gel 2 g  2 g Topical 4x Daily PRN Renetta Gatica II, DO       • docusate sodium (COLACE) capsule 100 mg  100 mg Oral BID Rukhsana Sullivan APRN       • doxycycline (MONODOX) capsule 100 mg  100 mg Oral Q12H Jenna Campbell MD   100 mg at 10/11/22 0805   • famotidine (PEPCID) tablet 20 mg  20 mg Oral Daily Renetta Gatica II, DO   20 mg at 10/11/22 0806   • glucagon (human recombinant) (GLUCAGEN DIAGNOSTIC) injection 1 mg  1 mg Intramuscular Q15 Min PRN Renetta Gatica II, DO       • HYDROcodone-acetaminophen (NORCO) 5-325 MG per tablet 1 tablet  1 tablet Oral Q4H PRN Renetta Gatica II, DO   1 tablet at 10/04/22 0137   • Influenza Vac High-Dose Quad (FLUZONE HIGH DOSE) injection 0.7 mL  0.7 mL Intramuscular During Hospitalization Renetta Gatica II, DO       • insulin detemir (LEVEMIR) injection 8 Units  8 Units Subcutaneous Nightly Melissa Nguyen PA-C   8 Units at 10/10/22 2010   • Insulin Lispro (humaLOG) injection 0-9 Units  0-9 Units Subcutaneous TID AC Renetta Gatica II, DO   2 Units at 10/10/22 1741   • ipratropium-albuterol (DUO-NEB) nebulizer solution 3 mL  3 mL Nebulization Q4H PRN Renetta Gatica II, DO       • levothyroxine (SYNTHROID, LEVOTHROID) tablet 75 mcg  75 mcg Oral Q AM Renetta Gatica II, DO   75 mcg at 10/11/22 0538   • Magnesium Sulfate 2 gram infusion - Mg less than or equal to 1.5 mg/dL  2 g Intravenous PRN Sarah Pineda,  APRN        Or   • Magnesium Sulfate 1 gram infusion - Mg 1.6-1.9 mg/dL  1 g Intravenous PRN Sarah Pineda, APRN       • nitroglycerin (NITROSTAT) SL tablet 0.4 mg  0.4 mg Sublingual Q5 Min PRN Sarah Pineda APRN       • ondansetron (ZOFRAN) tablet 4 mg  4 mg Oral Q6H PRN Aki Gaticacy M II, DO        Or   • ondansetron (ZOFRAN) injection 4 mg  4 mg Intravenous Q6H PRN Renetta Gatica M II, DO   4 mg at 10/02/22 1509   • Pharmacy Consult - MTM   Does not apply Daily Mya Han, PharmD       • saccharomyces boulardii (FLORASTOR) capsule 250 mg  250 mg Oral BID Melissa Nguyen PA-C   250 mg at 10/11/22 0806   • sodium chloride 0.9 % flush 10 mL  10 mL Intravenous PRN Sarah Pineda APRN       • sodium chloride 0.9 % flush 10 mL  10 mL Intravenous Q12H Sarah Pineda APRN   10 mL at 10/09/22 2011   • sodium chloride 0.9 % flush 10 mL  10 mL Intravenous PRN Sarah Pineda, APRN       • sodium chloride 0.9 % flush 10 mL  10 mL Intravenous Q12H EnAkicy M II, DO   10 mL at 10/11/22 0806   • sodium chloride 0.9 % flush 10 mL  10 mL Intravenous PRN En, Renetta M II, DO       • sodium chloride nasal spray 1 spray  1 spray Nasal PRN Renetta Gatica II, DO         Facility-Administered Medications Ordered in Other Encounters   Medication Dose Route Frequency Provider Last Rate Last Admin   • Chlorhexidine Gluconate Cloth 2 % pads 1 application  1 application Topical Q12H PRN Mohan Arauz PA             Type 2 diabetes mellitus (HCC)    Hypertension    Hyperlipidemia    Hypothyroidism (acquired)    S/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 Au    SSS (sick sinus syndrome) ([facility])    [place] (acute kidney injury) ([facility])    Acute on chronic HFrEF (heart failure with reduced ejection fraction) ([facility])    Elevated troponin    Cellulitis of right lower extremity        Impression      -  Systolic heart failure with subacute decompensation--improved  -  Coronary artery disease with previous CABG February 2019  -   Nonspecific troponin evaluation with no classic rise and fall  -  Sick sinus syndrome [employer] pacemaker  -  Peripheral vascular disease status post amputation left great toe August 2022  -  History of bilateral lower extremity DVT August 2022  -  Chronic kidney disease--stable  -  Hypertension  -  Lexiscan reveals anteroseptal scar with no significant ischemia.  LVEF 42%    Plan     -  Continue present medications.  Will defer ACE ARB and Entresto for the time being secondary to kidney disease  -  Care previously discussed with Dr. Márquez.  Although he presented with some decompensation of heart failure, he is currently stable and his nuclear scan this admission was acceptable.  Therefore I think he is a reasonable risk for general anesthesia for amputation of the second toe on the left foot if needed.  I discussed the timing of this with Dr. Márquez who thinks it could be done electively when the patient is more mentally and physically up to the task..    -  Transitioned to oral antibiotics.  Hopefully to outpatient rehab soon.    Megan Shukla PA-C working with Cooper Apodaca MD   10/11/22  10:29 EDT

## 2022-10-11 NOTE — DISCHARGE SUMMARY
[facility] Hospital Medicine Services  TRANSFER SUMMARY    Patient Name: Jermaine Singh  : 1945  MRN: [id number]    Date of Admission: 10/2/2022  Date of Discharge: 10/11/2022  Length of Stay: 9  Primary Care Physician: Farooq Painter MD    Consults     Date and Time Order Name Status Description    10/7/2022  8:13 AM Inpatient Infectious Diseases Consult Completed     10/2/2022  9:49 AM Inpatient Cardiology Consult Completed         Hospital Course   Presenting Problem:   Angina at rest ([facility]) [I20.8]    Active Hospital Problems    Diagnosis  POA   • Elevated troponin [R77.8]  Yes   • Cellulitis of right lower extremity [L03.115]  Yes   • [place] (acute kidney injury) ([facility]) [N17.9]  Yes   • Acute on chronic HFrEF (heart failure with reduced ejection fraction) ([facility]) [I50.23]  Yes   • SSS (sick sinus syndrome) ([facility]) [I49.5]  Yes   • Type 2 diabetes mellitus ([facility]) [E11.9]  Yes   • Hypertension [I10]  Yes   • Hyperlipidemia [E78.5]  Yes   • Hypothyroidism (acquired) [E03.9]  Yes   • S/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 Au [Z95.1]  Not Applicable      Resolved Hospital Problems    Diagnosis Date Resolved POA   • Angina at rest ([facility]) [I20.8] 10/02/2022 Yes      Hospital Course:  Jermaine Singh is a 77 y.o. male with PMH significant for HTN, HLD, CAD (s/p CABG x 3 in 2019), SSS s/p PPM, PVD (s/p RLE angioplasty 2021 and L great toe amputation 2022), CKD III and chronic systolic CHF. Recent hospitalization at [facility] -22 for L 2ndt toe osteomyelitis s/p amputation with subsequent [place] and acute hypoxic respiratory failure secondary to systolic CHF exacerbation. Also found to have bilateral DVTs during this admission. He discharged to [facility] for rehab.      He returned to [facility] 10/2/22 for evaluation of shortness of breath and lower extremity edema. Also RLE cellulitis (PCP started on Bactrim outpatient without improvement. He was admitted to the  hospital medicine service for acute on chronic systolic CHF exacerbation and RLE cellulitis.      Hypoxia  Acute on chronic systolic CHF   Right pleural effusion  Elevated troponin  - Hypoxic in ED. Required 4L NC to maintain O2 saturations. Has weaned to room air   - Appreciate cardiology assistance  - Elevated troponin felt to be due to CHF in the setting of renal failure. No chest pain / EKG changes.   - 10/3 Lexiscan stress test showed anteroseptal scar with no significant ischemia. LVEF 42%  - PPM interrogated this admission  - s/p IV diuresis. Now on Bumex 1mg PO daily   - Continue Carvedilol. No ACEI / ARB / ARNI or aldactone due to renal dysfunction      Acute urinary retention  - Likely due to severe scrotal edema in the setting of a/c CHF  - Continue scrotal support  - Patient refused catheter. He reported to me that he is voiding spontaneously     [place]   - Multifactorial in the setting of Bactrim use and a/c CHF  - New baseline creatinine following recent admission appears to be ~ 1.6   - Creatinine peaked at 2.18 on 10/2, improved to 1.61 on 10/9     RLE cellulitis  PVD with recent L great toe amputation by Dr. Márquez  - Failed outpatient treatment with Bactrim  - Appreciate Dr. Márquez / ID assistance  - May need L 2nd toe amputation due to exposed middle phalangeal bone / middle phalangeal joint space from hammertoe phenomena   - For now, WBAT with offloading shoe  - Now on PO Doxycycline per ID x 1 month  - Continue probiotic  - Follow up with Dr. Braga in 1 month     Insulin-dependent DMII w/A1c 7.2%   --24H glucose 103-215  - Continue Levemir 8 units QHS  - Patient refused scheduled Lispro yesterday. Will hold today, continue SSI TID AC PRN for glucose > 150      Depression / anxiety  - My partner started Sertraline 25mg QHS on 10/6 - patient now believes this medication is contributing to poor sleep and he does not want to continue taking it. Stopped on 10/9   - Could consider Mirtazapine  QHS     Generalized weakness  - PT/OT following. He is requiring assist x 2 - recommend rehab    Patient will be discharged today to [facility] and  van will pick him up at 1230 to transport.  Discharge follow-up recommendations and appointments are listed below.    Discharge Follow Up Recommendations for outpatient labs/diagnostics:  --Follow-up with facility provider in 1 to 2 days  --Follow-up with your family doctor 1 week after discharge from [facility]  --Follow-up with Dr. Márquez on 11/9/2022 as already scheduled  --Follow-up with Dr. Braga in 4 weeks  --Follow-up with Dr. Robles in 2 weeks  --Follow-up with Dr. Apodaca as scheduled  --Continue doxycycline through 11/3/2022  --Continue probiotic through 11/8/2022  --Weight-bearing as tolerated with offloading shoe  --Stop your Norvasc (amlodipine)  --Take Lipitor and Coreg as prescribed  --Use nitroglycerin sublingual for chest pain  Day of Discharge   HPI:   Patient sitting up in a chair and states that he is glad he is going to [facility] today.  Wife is at the bedside.  No adverse events overnight.    Review of Systems  Gen- No fevers, chills  CV- No chest pain, palpitations  Resp- No cough, dyspnea  GI- No N/V/D, abd pain  -scrotal edema improving per patient  All other systems have been reviewed and the pertinent positives and negatives are listed above in the HPI or ROS    Vital Signs:   Temp:  [97.6 °F (36.4 °C)-98.7 °F (37.1 °C)] 98 °F (36.7 °C)  Heart Rate:  [63-70] 67  Resp:  [16-18] 16  BP: (105-129)/(69-84) 128/76   Physical Exam:  Constitutional: Alert, elderly male sitting up in chair in NAD  Eyes: EOMI, sclerae anicteric, no conjunctival injection  Head: NCAT  ENT: Bromide, moist mucous membranes   Respiratory: Nonlabored, symmetrical chest expansion, CTAB, RA  Cardiovascular: RRR, HR 67, no M/R/G  Gastrointestinal: Soft, NT, ND +BS  Musculoskeletal: ALBARADO; +1 LE edema bilaterally; Off-loading boot on left foot (did not remove)  Neurologic: Oriented x4, strength  symmetric in all extremities, follows all commands, speech clear  Skin: No rashes on exposed skin; right lower leg mildly erythematous and very dry  Psychiatric: Pleasant and cooperative; flat affect  Pertinent Results     Results from last 7 days   Lab Units 10/11/22  0608 10/09/22  1014 10/08/22  0655 10/07/22  0638 10/06/22  0716 10/05/22  0845 10/05/22  0844   WBC 10*3/mm3  --   --  5.33  --  4.94  --  5.16   HEMOGLOBIN g/dL  --   --  11.1*  --  10.8*  --  11.0*   HEMATOCRIT %  --   --  34.4*  --  34.3*  --  35.9*   PLATELETS 10*3/mm3  --   --  169  --  152  --  154   SODIUM mmol/L 140 138 137 136 140 137  --    POTASSIUM mmol/L 4.7 4.4 4.4 4.9 4.7 4.8  --    CHLORIDE mmol/L 102 103 102 101 102 99  --    CO2 mmol/L 29.0 25.0 25.0 23.0 27.0 26.0  --    BUN mg/dL 27* 24* 24* 26* 29* 31*  --    CREATININE mg/dL 1.43* 1.61* 1.77* 1.70* 1.86* 2.04*  --    GLUCOSE mg/dL 133* 140* 141* 147* 85 117*  --    CALCIUM mg/dL 9.1 8.8 8.8 9.0 9.0 9.1  --      Results from last 7 days   Lab Units 10/08/22  0655 10/06/22  0716 10/05/22  0845   BILIRUBIN mg/dL 0.2 0.2 0.3   ALK PHOS U/L 62 68 78   ALT (SGPT) U/L 13 11 13   AST (SGOT) U/L 11 7 9           Invalid input(s): TG, LDLCALC, LDLREALC      Brief Urine Lab Results  (Last result in the past 365 days)      Color   Clarity   Blood   Leuk Est   Nitrite   Protein   CREAT   Urine HCG        08/23/22 1309             59.2         08/23/22 1309 [place]   Cloudy   Large (3+)   Trace   Negative   30 mg/dL (1+)                 Microbiology Results Abnormal     Procedure Component Value - Date/Time    Blood Culture - Blood, Arm, Left [[id number]]  (Normal) Collected: 10/02/22 0433    Lab Status: Final result Specimen: Blood from Arm, Left Updated: 10/07/22 0647     Blood Culture No growth at 5 days    Blood Culture - Blood, Arm, Left [[id number]]  (Normal) Collected: 10/02/22 0433    Lab Status: Final result Specimen: Blood from Arm, Left Updated: 10/07/22 0647     Blood Culture No  growth at 5 days          Imaging Results (All)     Procedure Component Value Units Date/Time    XR Chest 1 View [[id number]] Collected: 10/02/22 0624     Updated: 10/02/22 0626    Narrative:      FRONTAL VIEW OF THE CHEST    CLINICAL INDICATION: Dyspnea    COMPARISON: 8/18/2022.    FINDINGS: Stable heart size. Stable postsurgical changes, implantable device and leads. Worsening opacities are seen in the right midlung. Small right pleural effusion. No pneumothorax.      Impression:      1. Small right pleural effusion.  2. Right basilar opacities could reflect atelectasis or pneumonia.    Electronically signed by:  Eyad Jones M.D.    10/2/2022 4:25 AM Mountain Time          Results for orders placed during the hospital encounter of 07/27/22    Adult Transthoracic Echo Complete W/ Cont if Necessary Per Protocol    Interpretation Summary  · Left ventricular ejection fraction appears to be 46 - 50%. Left ventricular systolic function is low normal.  · Left ventricular septal hypokinesis  · No significant valvular heart disease          Discharge Details        Discharge Medications      New Medications      Instructions Start Date   atorvastatin 20 MG tablet  Commonly known as: LIPITOR   20 mg, Oral, Nightly      carvedilol 3.125 MG tablet  Commonly known as: COREG   3.125 mg, Oral, 2 Times Daily With Meals      docusate sodium 100 MG capsule   100 mg, Oral, 2 Times Daily      doxycycline 100 MG capsule  Commonly known as: MONODOX   100 mg, Oral, Every 12 Hours Scheduled   Start Date: November 3, 2022     insulin detemir 100 UNIT/ML injection  Commonly known as: LEVEMIR  Replaces: Levemir FlexTouch 100 UNIT/ML injection   8 Units, Subcutaneous, Nightly      Insulin Lispro 100 UNIT/ML injection  Commonly known as: humaLOG   0-9 Units, Subcutaneous, 3 Times Daily Before Meals      nitroglycerin 0.4 MG SL tablet  Commonly known as: NITROSTAT   0.4 mg, Sublingual, Every 5 Minutes PRN, Take no more than 3 doses in 15  minutes.      saccharomyces boulardii 250 MG capsule  Commonly known as: FLORASTOR   250 mg, Oral, 2 Times Daily, Take through 11/8/22         Changes to Medications      Instructions Start Date   bethanechol 5 MG tablet  Commonly known as: URECHOLINE  What changed: Another medication with the same name was removed. Continue taking this medication, and follow the directions you see here.   10 mg, Oral, 3 Times Daily      bumetanide 1 MG tablet  Commonly known as: BUMEX  What changed:   medication strength  how much to take   1 mg, Oral, Daily   Start Date: October 12, 2022        Continue These Medications      Instructions Start Date   apixaban 5 MG tablet tablet  Commonly known as: ELIQUIS   5 mg, Oral, Every 12 Hours Scheduled      aspirin 81 MG chewable tablet   81 mg, Oral, Daily      Diclofenac Sodium 1 % gel gel  Commonly known as: VOLTAREN   2 g, Topical, 4 Times Daily PRN      famotidine 20 MG tablet  Commonly known as: PEPCID   20 mg, Oral, Daily      FISH OIL PO   Oral      HYDROcodone-acetaminophen 5-325 MG per tablet  Commonly known as: NORCO   1 tablet, Oral, Every 4 Hours PRN      ipratropium-albuterol 0.5-2.5 mg/3 ml nebulizer  Commonly known as: DUO-NEB   3 mL, Nebulization, Every 4 Hours PRN      levothyroxine 75 MCG tablet  Commonly known as: SYNTHROID, LEVOTHROID   75 mcg, Oral, Every Morning      magnesium oxide 400 MG tablet  Commonly known as: MAG-OX   400 mg, Oral, Daily      PROBIOTIC DAILY PO   Oral      rosuvastatin 20 MG tablet  Commonly known as: CRESTOR   20 mg, Oral, Daily      sodium chloride 0.65 % nasal spray   1 spray, Nasal, As Needed      tamsulosin 0.4 MG capsule 24 hr capsule  Commonly known as: FLOMAX   0.4 mg, Oral, Daily      vitamin D3 125 MCG (5000 UT) tablet tablet   5,000 Units, Oral, Daily      ZINC CITRATE PO   Oral         Stop These Medications    amLODIPine 5 MG tablet  Commonly known as: NORVASC     Levemir FlexTouch 100 UNIT/ML injection  Generic drug: insulin  detemir  Replaced by: insulin detemir 100 UNIT/ML injection     sulfamethoxazole-trimethoprim 800-160 MG per tablet  Commonly known as: BACTRIM DS,SEPTRA DS            Allergies   Allergen Reactions   • Vancomycin Other (See Comments)     Kidney failure per last admission         Discharge Disposition:  Rehab Facility or Unit (DC - External)    Discharge Diet:  Diet Order   Procedures   • Diet Regular; Consistent Carbohydrate, Cardiac       Discharge Activity:  Activity Instructions     Activity as Tolerated              CODE STATUS:    Code Status and Medical Interventions:   Ordered at: 10/02/22 0949     Code Status (Patient has no pulse and is not breathing):    CPR (Attempt to Resuscitate)     Medical Interventions (Patient has pulse or is breathing):    Full Support         Future Appointments   Date Time Provider Department Center   11/9/2022  9:45 AM Gopal Márquez MD [facility]       Additional Instructions for the Follow-ups that You Need to Schedule     Discharge Follow-up with PCP   As directed       Currently Documented PCP:    Farooq Painter MD    PCP Phone Number:    [phone]     Follow Up Details: 1 week after discharge from [facility]         Discharge Follow-up with Specialty: Follow-up with Dr. Apodaca as scheduled   As directed      Specialty: Follow-up with Dr. Apodaca as scheduled         Discharge Follow-up with Specified Provider: Dr. Márquez; keep your 11/9/2022 scheduled appointment   As directed      To: Dr. Márquez; keep your 11/9/2022 scheduled appointment         Discharge Follow-up with Specified Provider: Dr. Robles; 2 weeks; please schedule appointment prior to patient's discharge   As directed      To: Dr. Robles; 2 weeks; please schedule appointment prior to patient's discharge         Discharge Follow-up with Specified Provider: Dr. Braga; 4 weeks; please schedule appointment prior to patient's discharge   As directed      To: Dr. Braga; 4 weeks; please schedule appointment prior  to patient's discharge               Electronically signed by DAVID Montgomery, 10/11/22, 11:21 AM EDT.      Time Spent on Discharge: I spent  45  minutes on this discharge activity which included: face-to-face encounter with the patient, reviewing the data in the system, coordination of the care with the nursing staff as well as consultants, documentation, and entering orders.

## 2022-10-11 NOTE — THERAPY TREATMENT NOTE
Patient Name: Jermaine Singh  : 1945    MRN: [id number]                              Today's Date: 10/11/2022       Admit Date: 10/2/2022    Visit Dx:     ICD-10-CM ICD-9-CM   1. Angina at rest ([facility])  I20.8 413.9   2. Systolic congestive heart failure, unspecified HF chronicity ([facility])  I50.20 428.20     428.0   3. 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  Preserved LV function   • Type 2 diabetes mellitus ([facility])   • Hypertension   • Hyperlipidemia   • Hypothyroidism (acquired)   • Vitamin D deficiency on Rx    • Serum Cr 1.32 on admission.  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 Au   • Dizziness   • Atherosclerosis of native artery of both lower extremities with intermittent claudication ([facility])   • SSS (sick sinus syndrome) ([facility])   • Subacute osteomyelitis of left foot ([facility])   • Lactic acidosis   • Proximal phalanx fracture of the second digit extending into the second metatarsal joint   • [place] (acute kidney injury) ([facility])   • Acute on chronic HFrEF (heart failure with reduced ejection fraction) ([facility])   • DVT, bilateral lower limbs ([facility])   • Elevated troponin   • Cellulitis of right lower extremity     Past Medical History:   Diagnosis Date   • Asthma    • Coronary artery disease    • Diabetes mellitus ([facility])     started on inuslin 2018; started on po meds in ; checking blood sugars daily    • Disease of thyroid gland     po meds daily for hypothyroidism    • History of fracture as a child     rt leg- severe    • Hyperlipidemia    • Hypertension    • Hypothyroidism    • Peripheral neuropathy    • Peripheral vascular disease ([facility])     s/p angiogram -needs stent in left leg    • RBBB    • Right knee pain    • Vitamin D deficiency      Past Surgical History:   Procedure Laterality Date   • APPENDECTOMY     • CARDIAC  CATHETERIZATION N/A 2/14/2019    Procedure: Left Heart Cath;  Surgeon: Cooper Apodaca MD;  Location:  [facility] IN[facility]ASIVE [facility]ATION;  Service: Cardiology   • CARDIAC ELECTROPHYSIOLOGY PROCEDURE N/A 5/18/2022    Procedure: DEVICE IMPLANT;  Surgeon: Cooper Apodaca MD;  Location:  [facility] CATH INVASIVE LOCATION;  Service: Cardiology;  Laterality: N/A;   • COLONOSCOPY     • CORONARY ARTERY BYPASS GRAFT N/A 3/22/2019    Procedure: MEDIAN STERNOTOMY, CORONARY ARTERY BYPASS GRAFT X3, UTILIZING THE LEFT INTERNAL MAMMARY ARTERY, EVH AND OPEN HARVEST OF THE RIGHT GREATER SAPHENOUS VEIN, EXPLORATION OF THE LEFT LEG;  Surgeon: Ap Au MD;  Location:  [facility] OR;  Service: Cardiothoracic   • EYE SURGERY Bilateral     cataracts    • INTERVENTIONAL RADIOLOGY PROCEDURE N/A 7/29/2021    Procedure: Abdominal Aortagram with Runoff;  Surgeon: Jermaine Hernandez MD;  Location:  [facility]ATH [facility]AS[facility];  Service: Cardiovascular;  Laterality: N/A;   • KNEE ARTHROSCOPY      right x 2, left x 1   • LACERATION REPAIR      right leg   • LEG SURGERY      2 for fracture of rt leg    • TONSILLECTOMY      Adnoidectomy   • TRANS METATARSAL AMPUTATION Left 8/2/2022    Procedure: GREAT TOE AMPUTATION LEFT;  Surgeon: Gopal Márquez MD;  Location:  [facility];  Service: Vascular;  Laterality: Left;      General Information     Row Name 10/11/22 1120          Physical Therapy Time and Intention    Document Type therapy note (daily note)  -ROSELIA     Mode of Treatment physical therapy  -ROSELIA     Row Name 10/11/22 1120          General Information    Patient Profile Reviewed yes  -ROSELAI     Prior Level of Function independent:;bed mobility;ADL's;gait;transfer  -ROSELIA     Existing Precautions/Restrictions fall  -ROSELIA     Barriers to Rehab medically complex  -ROSELIA     Row Name 10/11/22 1120          Living Environment    People in Home spouse  -ROSELIA     Row Name 10/11/22 1120          Home Main Entrance    Number of Stairs, Main Entrance none  -ROSELIA      Row Name 10/11/22 1120          Cognition    Orientation Status (Cognition) oriented x 4  -ROSELIA     Row Name 10/11/22 1120          Safety Issues, Functional Mobility    Safety Issues Affecting Function (Mobility) insight into deficits/self-awareness;awareness of need for assistance  -ROSELIA     Impairments Affecting Function (Mobility) balance;endurance/activity tolerance;strength  -ROSELIA           User Key  (r) = Recorded By, (t) = Taken By, (c) = Cosigned By    Initials Name Provider Type    Montserrat Ibanez, PT Physical Therapist               Mobility     Row Name 10/11/22 1121          Bed Mobility    Comment, (Bed Mobility) patient is OOB and returns to the chair  -ROSELIA     Row Name 10/11/22 1121          Transfers    Comment, (Transfers) offloading shoe in place  -ROSELIA     Row Name 10/11/22 1121          Bed-Chair Transfer    Bed-Chair Gilman (Transfers) contact guard  -ROSELIA     Assistive Device (Bed-Chair Transfers) walker, front-wheeled  -ROSELIA     Row Name 10/11/22 1121          Sit-Stand Transfer    Sit-Stand Gilman (Transfers) contact guard  -ROSELIA     Assistive Device (Sit-Stand Transfers) walker, front-wheeled  -ROSELIA     Comment, (Sit-Stand Transfer) much improved standing more fluid movement in being able to stand straight  -ROSELIA     Row Name 10/11/22 1121          Gait/Stairs (Locomotion)    Gilman Level (Gait) contact guard  -ROSELIA     Assistive Device (Gait) walker, front-wheeled  -ROSELIA     Distance in Feet (Gait) 100  -ROSELIA     Deviations/Abnormal Patterns (Gait) stride length decreased  -ROSELIA     Bilateral Gait Deviations forward flexed posture  -ROSELIA     Comment, (Gait/Stairs) patient more fluid with gait pattern no rest break required today  -ROSELIA     Row Name 10/11/22 1121          Mobility    Left Lower Extremity (Weight-bearing Status) weight-bearing as tolerated (WBAT)  -ROSELIA           User Key  (r) = Recorded By, (t) = Taken By, (c) = Cosigned By    Initials Name Provider Type    Montserrat Ibanez,  PT Physical Therapist               Obj/Interventions     Row Name 10/11/22 1125          Balance    Balance Assessment sitting static balance;sitting dynamic balance;standing static balance;standing dynamic balance  -ROSELIA     Static Sitting Balance independent  -ROSELIA     Dynamic Sitting Balance independent  -ROSELIA     Position, Sitting Balance unsupported;sitting in chair  -ROSELIA     Static Standing Balance contact guard  -ROSELIA     Dynamic Standing Balance contact guard  -ROSELIA     Position/Device Used, Standing Balance supported;walker, front-wheeled  -ROSELIA     Balance Interventions standing;dynamic;weight shifting activity  -ROSELIA           User Key  (r) = Recorded By, (t) = Taken By, (c) = Cosigned By    Initials Name Provider Type    Montserrat Ibanez, PT Physical Therapist               Goals/Plan     Row Name 10/11/22 1128          Transfer Goal 1 (PT)    Progress/Outcome (Transfer Goal 1, PT) good progress toward goal;goal ongoing  -     Row Name 10/11/22 1128          Gait Training Goal 1 (PT)    Progress/Outcome (Gait Training Goal 1, PT) goal partially met;goal ongoing  -     Row Name 10/11/22 1128          Therapy Assessment/Plan (PT)    Planned Therapy Interventions (PT) balance training;bed mobility training;gait training;home exercise program;strengthening;transfer training  -ROSELIA           User Key  (r) = Recorded By, (t) = Taken By, (c) = Cosigned By    Initials Name Provider Type    Montserrat Ibanez PT Physical Therapist               Clinical Impression     Row Name 10/11/22 1125          Pain    Pretreatment Pain Rating 2/10  -ROSELIA     Posttreatment Pain Rating 2/10  -ROSELIA     Pre/Posttreatment Pain Comment scrotal pain  -ROSELIA     Pain Intervention(s) Repositioned;Ambulation/increased activity;Splinting  -     Row Name 10/11/22 1125          Plan of Care Review    Plan of Care Reviewed With patient;spouse  -ROSELIA     Progress improving  -ROSELIA     Outcome Evaluation patient was able to increase ambulation to 100  ft with walker and CGA and no rest break required today patient is progressing toward goals as expected we will continue to progress activity until D/C to rehab facility.  -ROSELIA     Row Name 10/11/22 1125          Therapy Assessment/Plan (PT)    Patient/Family Therapy Goals Statement (PT) get stronger  -ROSELIA     Rehab Potential (PT) good, to achieve stated therapy goals  -ROSELIA     Criteria for Skilled Interventions Met (PT) yes;skilled treatment is necessary  -ROSELIA     Therapy Frequency (PT) daily  -ROSELIA     Row Name 10/11/22 1125          Vital Signs    Pre Patient Position Sitting  -ROSELIA     Intra Patient Position Standing  -ROSELIA     Post Patient Position Sitting  -ROSELIA     Row Name 10/11/22 1125          Positioning and Restraints    Pre-Treatment Position sitting in chair/recliner  -ROSELIA     Post Treatment Position chair  -ROSELIA     In Chair notified nsg;reclined;sitting;call light within reach;encouraged to call for assist;exit alarm on;with family/caregiver  -ROSELIA           User Key  (r) = Recorded By, (t) = Taken By, (c) = Cosigned By    Initials Name Provider Type    Montserrat Ibanez, PT Physical Therapist               Outcome Measures     Row Name 10/11/22 1129 10/11/22 0800       How much help from another person do you currently need...    Turning from your back to your side while in flat bed without using bedrails? 4  -ROSELIA 4  -LC    Moving from lying on back to sitting on the side of a flat bed without bedrails? 3  -ROSELIA 3  -LC    Moving to and from a bed to a chair (including a wheelchair)? 3  -ROSELIA 3  -LC    Standing up from a chair using your arms (e.g., wheelchair, bedside chair)? 3  -ROSELIA 3  -LC    Climbing 3-5 steps with a railing? 3  -ROSELIA 3  -LC    To walk in hospital room? 3  -ROSELIA 3  -LC    AM-PAC 6 Clicks Score (PT) 19  -ROSELIA 19  -LC    Highest level of mobility 6 --> Walked 10 steps or more  -ROSELIA 6 --> Walked 10 steps or more  -LC          User Key  (r) = Recorded By, (t) = Taken By, (c) = Cosigned By    Initials Name  Provider Type    Montserrat Ibanez, PT Physical Therapist    Tiana Louise, RN Registered Nurse                             Physical Therapy Education     Title: PT OT SLP Therapies (In Progress)     Topic: Physical Therapy (Done)     Point: Mobility training (Done)     Learning Progress Summary           Patient Acceptance, E, VU by ROSELIA at 10/11/2022 1030    Acceptance, E,TB, VU,NR by ELENA at 10/9/2022 1154    Acceptance, E, NR by ROSELIA at 10/7/2022 1330    Acceptance, E,D, VU,NR by MB at 10/4/2022 1559   Family Acceptance, E, VU by ROSELIA at 10/11/2022 1030                   Point: Home exercise program (Done)     Learning Progress Summary           Patient Acceptance, E, VU by ROSELIA at 10/11/2022 1030    Acceptance, E, NR by ROSELIA at 10/7/2022 1330    Acceptance, E,D, VU,NR by MB at 10/4/2022 1559   Family Acceptance, E, VU by ROSELIA at 10/11/2022 1030                   Point: Body mechanics (Done)     Learning Progress Summary           Patient Acceptance, E, VU by ROSELIA at 10/11/2022 1030    Acceptance, E,TB, VU,NR by ELENA at 10/9/2022 1154    Acceptance, E, NR by ROSELIA at 10/7/2022 1330    Acceptance, E,D, VU,NR by MB at 10/4/2022 1559   Family Acceptance, E, VU by ROSELIA at 10/11/2022 1030                   Point: Precautions (Done)     Learning Progress Summary           Patient Acceptance, E, VU by ROSELIA at 10/11/2022 1030    Acceptance, E,TB, VU,NR by ELENA at 10/9/2022 1154    Acceptance, E, NR by ROSELIA at 10/7/2022 1330    Acceptance, E,D, VU,NR by MB at 10/4/2022 1559   Family Acceptance, E, VU by ROSELIA at 10/11/2022 1030                               User Key     Initials Effective Dates Name Provider Type Discipline    ROSELIA 06/16/21 -  Montserrat Knox, PT Physical Therapist PT    MB 06/16/21 -  Dixie Carrizales, PT Physical Therapist PT    ELENA 11/10/20 -  Macrina Lazcano PT Physical Therapist PT              PT Recommendation and Plan  Planned Therapy Interventions (PT): balance training, bed mobility training, gait training, home  exercise program, strengthening, transfer training  Plan of Care Reviewed With: patient, spouse  Progress: improving  Outcome Evaluation: patient was able to increase ambulation to 100 ft with walker and CGA and no rest break required today patient is progressing toward goals as expected we will continue to progress activity until D/C to rehab facility.     Time Calculation:    PT Charges     Row Name 10/11/22 1130             Time Calculation    Start Time 1030  -ROSELIA      PT Received On 10/11/22  -ROSELIA      PT Goal Re-Cert Due Date 10/14/22  -ROSELIA         Time Calculation- PT    Total Timed Code Minutes- PT 23 minute(s)  -ROSELIA         Timed Charges    30089 - PT Therapeutic Activity Minutes 23  -ROSELIA         Total Minutes    Timed Charges Total Minutes 23  -ROSELIA       Total Minutes 23  -ROSELIA            User Key  (r) = Recorded By, (t) = Taken By, (c) = Cosigned By    Initials Name Provider Type    Montserrat Ibanez PT Physical Therapist              Therapy Charges for Today     Code Description Service Date Service Provider Modifiers Qty    [id number] HC PT THERAPEUTIC ACT EA 15 MIN 10/11/2022 Montserrat Knox PT GP 2          PT G-Codes  Outcome Measure Options: AM-PAC 6 Clicks Daily Activity (OT)  AM-PAC 6 Clicks Score (PT): 19  AM-PAC 6 Clicks Score (OT): 16    Montserrat Knox PT  10/11/2022

## 2022-10-11 NOTE — PLAN OF CARE
Goal Outcome Evaluation:  Plan of Care Reviewed With: patient--pt verbalizes understanding of POC--discharge teaching, medications, follow-up appointments and transfer to [facility] this afternoon. Daughter present at bedside--dressing changed to left toe amputation site and second toe affected area also. Tele has shown a V-Paced rhythm. Denies pain and VSS and WNL for pt. Ambulating in gonsales with PT today approximately 100 feet. Medical van to transport pt.        Progress: improving

## 2022-10-17 ENCOUNTER — TELEPHONE (OUTPATIENT)
Dept: INTERNAL MEDICINE | Facility: CLINIC | Age: 77
End: 2022-10-17

## 2022-10-17 NOTE — TELEPHONE ENCOUNTER
Caller: HIPOLITO DAWN    Relationship to Patient: WIFE    Phone Number: [phone]    Reason for Call: WOULD LIKE TO SEE IF DR CANNON WOULD ACCEPT HARSHAD AS A NEW PATIENT. DR. CANNON USED TO TREAT HIS MOTHER [place].

## 2022-10-17 NOTE — TELEPHONE ENCOUNTER
Please let her know that It will be March before I can see him. If that's okay, please schedule him using 2 x 15 min appts in March or after.

## 2022-10-19 ENCOUNTER — READMISSION MANAGEMENT (OUTPATIENT)
Dept: CALL CENTER | Facility: HOSPITAL | Age: 77
End: 2022-10-19

## 2022-10-19 ENCOUNTER — TELEPHONE (OUTPATIENT)
Dept: FAMILY MEDICINE CLINIC | Facility: CLINIC | Age: 77
End: 2022-10-19

## 2022-10-19 NOTE — OUTREACH NOTE
Prep Survey    Flowsheet Row Responses   [facility] facility patient discharged from? Non-BH   Is LACE score < 7 ? Non-BH Discharge   Emergency Room discharge w/ pulse ox? No   Eligibility [facility]   Date of Discharge 10/21/22   Discharge Disposition Home or Self Care   Discharge diagnosis Angina at rest   Does the patient have one of the following disease processes/diagnoses(primary or secondary)? Other   Prep survey completed? Yes          EDDIE CARVER - Registered Nurse

## 2022-10-24 ENCOUNTER — TRANSITIONAL CARE MANAGEMENT TELEPHONE ENCOUNTER (OUTPATIENT)
Dept: CALL CENTER | Facility: HOSPITAL | Age: 77
End: 2022-10-24

## 2022-10-24 NOTE — OUTREACH NOTE
Call Center TCM Note    Flowsheet Row Responses   [facility] patient discharged from? Non-BH   Does the patient have one of the following disease processes/diagnoses(primary or secondary)? Other   TCM attempt successful? Yes   Call start time 1214   Call end time 1215   Discharge diagnosis Angina at rest   Does the patient have all medications ordered at discharge? Yes   Is the patient taking all medications as directed (includes completed medication regime)? Yes   Comments new patient appt with Dr. Kennedy on 10/28   Does the patient have an appointment with their PCP within 7 days of discharge? Yes   Has home health visited the patient within 72 hours of discharge? N/A   Psychosocial issues? No   Did the patient receive a copy of their discharge instructions? Yes   What is the patient's perception of their health status since discharge? Improving   Is the patient/caregiver able to teach back the hierarchy of who to call/visit for symptoms/problems? PCP, Specialist, Home health nurse, Urgent Care, ED, 911 Yes   TCM call completed? Yes   Wrap up additional comments Doing well, no questions.   Call end time 1215   Would this patient benefit from a Referral to Amb Social Work? No   Is the patient interested in additional calls from an ambulatory ?  NOTE:  applies to high risk patients requiring additional follow-up. No          Yessy Blanchard RN    10/24/2022, 12:15 EDT

## 2022-10-28 ENCOUNTER — OFFICE VISIT (OUTPATIENT)
Dept: FAMILY MEDICINE CLINIC | Facility: CLINIC | Age: 77
End: 2022-10-28

## 2022-10-28 ENCOUNTER — LAB (OUTPATIENT)
Dept: LAB | Facility: HOSPITAL | Age: 77
End: 2022-10-28

## 2022-10-28 ENCOUNTER — PATIENT ROUNDING (BHMG ONLY) (OUTPATIENT)
Dept: FAMILY MEDICINE CLINIC | Facility: CLINIC | Age: 77
End: 2022-10-28

## 2022-10-28 VITALS
HEIGHT: 75 IN | DIASTOLIC BLOOD PRESSURE: 74 MMHG | OXYGEN SATURATION: 97 % | SYSTOLIC BLOOD PRESSURE: 128 MMHG | HEART RATE: 60 BPM | BODY MASS INDEX: 30.5 KG/M2

## 2022-10-28 DIAGNOSIS — Z95.1 S/P CABG X 3: Chronic | ICD-10-CM

## 2022-10-28 DIAGNOSIS — I49.5 SSS (SICK SINUS SYNDROME): Chronic | ICD-10-CM

## 2022-10-28 DIAGNOSIS — E55.9 VITAMIN D DEFICIENCY: ICD-10-CM

## 2022-10-28 DIAGNOSIS — E78.2 MIXED HYPERLIPIDEMIA: Chronic | ICD-10-CM

## 2022-10-28 DIAGNOSIS — Z12.5 ENCOUNTER FOR PROSTATE CANCER SCREENING: ICD-10-CM

## 2022-10-28 DIAGNOSIS — I49.5 SSS (SICK SINUS SYNDROME): ICD-10-CM

## 2022-10-28 DIAGNOSIS — I25.10 CORONARY ARTERY DISEASE INVOLVING NATIVE CORONARY ARTERY OF NATIVE HEART WITHOUT ANGINA PECTORIS: Primary | ICD-10-CM

## 2022-10-28 DIAGNOSIS — E78.2 MIXED HYPERLIPIDEMIA: ICD-10-CM

## 2022-10-28 DIAGNOSIS — I25.10 CORONARY ARTERY DISEASE INVOLVING NATIVE CORONARY ARTERY OF NATIVE HEART WITHOUT ANGINA PECTORIS: ICD-10-CM

## 2022-10-28 DIAGNOSIS — I10 PRIMARY HYPERTENSION: Chronic | ICD-10-CM

## 2022-10-28 DIAGNOSIS — Z95.1 S/P CABG X 3: ICD-10-CM

## 2022-10-28 DIAGNOSIS — I10 PRIMARY HYPERTENSION: ICD-10-CM

## 2022-10-28 PROBLEM — E66.9 OBESITY (BMI 30.0-34.9): Status: ACTIVE | Noted: 2022-10-28

## 2022-10-28 LAB
25(OH)D3 SERPL-MCNC: 49.9 NG/ML (ref 30–100)
ALBUMIN SERPL-MCNC: 3.5 G/DL (ref 3.5–5.2)
ALBUMIN/GLOB SERPL: 1.3 G/DL
ALP SERPL-CCNC: 118 U/L (ref 39–117)
ALT SERPL W P-5'-P-CCNC: 37 U/L (ref 1–41)
ANION GAP SERPL CALCULATED.3IONS-SCNC: 8.6 MMOL/L (ref 5–15)
AST SERPL-CCNC: 22 U/L (ref 1–40)
BASOPHILS # BLD AUTO: 0.04 10*3/MM3 (ref 0–0.2)
BASOPHILS NFR BLD AUTO: 0.5 % (ref 0–1.5)
BILIRUB SERPL-MCNC: 0.3 MG/DL (ref 0–1.2)
BUN SERPL-MCNC: 49 MG/DL (ref 8–23)
BUN/CREAT SERPL: 28.5 (ref 7–25)
CALCIUM SPEC-SCNC: 9.2 MG/DL (ref 8.6–10.5)
CHLORIDE SERPL-SCNC: 106 MMOL/L (ref 98–107)
CHOLEST SERPL-MCNC: 108 MG/DL (ref 0–200)
CO2 SERPL-SCNC: 28.4 MMOL/L (ref 22–29)
CREAT SERPL-MCNC: 1.72 MG/DL (ref 0.76–1.27)
DEPRECATED RDW RBC AUTO: 43.9 FL (ref 37–54)
EGFRCR SERPLBLD CKD-EPI 2021: 40.4 ML/MIN/1.73
EOSINOPHIL # BLD AUTO: 0.1 10*3/MM3 (ref 0–0.4)
EOSINOPHIL NFR BLD AUTO: 1.3 % (ref 0.3–6.2)
ERYTHROCYTE [DISTWIDTH] IN BLOOD BY AUTOMATED COUNT: 13.5 % (ref 12.3–15.4)
GLOBULIN UR ELPH-MCNC: 2.6 GM/DL
GLUCOSE SERPL-MCNC: 212 MG/DL (ref 65–99)
HCT VFR BLD AUTO: 31.8 % (ref 37.5–51)
HDLC SERPL-MCNC: 58 MG/DL (ref 40–60)
HGB BLD-MCNC: 10.3 G/DL (ref 13–17.7)
LDLC SERPL CALC-MCNC: 39 MG/DL (ref 0–100)
LDLC/HDLC SERPL: 0.71 {RATIO}
LYMPHOCYTES # BLD AUTO: 0.69 10*3/MM3 (ref 0.7–3.1)
LYMPHOCYTES NFR BLD AUTO: 9 % (ref 19.6–45.3)
MCH RBC QN AUTO: 28.6 PG (ref 26.6–33)
MCHC RBC AUTO-ENTMCNC: 32.4 G/DL (ref 31.5–35.7)
MCV RBC AUTO: 88.3 FL (ref 79–97)
MONOCYTES # BLD AUTO: 0.77 10*3/MM3 (ref 0.1–0.9)
MONOCYTES NFR BLD AUTO: 10.1 % (ref 5–12)
NEUTROPHILS NFR BLD AUTO: 6.05 10*3/MM3 (ref 1.7–7)
NEUTROPHILS NFR BLD AUTO: 79 % (ref 42.7–76)
PLATELET # BLD AUTO: 125 10*3/MM3 (ref 140–450)
PMV BLD AUTO: 13.4 FL (ref 6–12)
POTASSIUM SERPL-SCNC: 5.8 MMOL/L (ref 3.5–5.2)
PROT SERPL-MCNC: 6.1 G/DL (ref 6–8.5)
PSA SERPL-MCNC: 20.2 NG/ML (ref 0–4)
RBC # BLD AUTO: 3.6 10*6/MM3 (ref 4.14–5.8)
SODIUM SERPL-SCNC: 143 MMOL/L (ref 136–145)
T4 FREE SERPL-MCNC: 1.16 NG/DL (ref 0.93–1.7)
TRIGL SERPL-MCNC: 43 MG/DL (ref 0–150)
TSH SERPL DL<=0.05 MIU/L-ACNC: 7.27 UIU/ML (ref 0.27–4.2)
VLDLC SERPL-MCNC: 11 MG/DL (ref 5–40)
WBC NRBC COR # BLD: 7.66 10*3/MM3 (ref 3.4–10.8)

## 2022-10-28 PROCEDURE — 85025 COMPLETE CBC W/AUTO DIFF WBC: CPT

## 2022-10-28 PROCEDURE — 1111F DSCHRG MED/CURRENT MED MERGE: CPT | Performed by: INTERNAL MEDICINE

## 2022-10-28 PROCEDURE — 82306 VITAMIN D 25 HYDROXY: CPT

## 2022-10-28 PROCEDURE — 84443 ASSAY THYROID STIM HORMONE: CPT

## 2022-10-28 PROCEDURE — 80053 COMPREHEN METABOLIC PANEL: CPT

## 2022-10-28 PROCEDURE — 84439 ASSAY OF FREE THYROXINE: CPT

## 2022-10-28 PROCEDURE — 99214 OFFICE O/P EST MOD 30 MIN: CPT | Performed by: INTERNAL MEDICINE

## 2022-10-28 PROCEDURE — 80061 LIPID PANEL: CPT

## 2022-10-28 PROCEDURE — G0103 PSA SCREENING: HCPCS

## 2022-10-28 RX ORDER — ACETAMINOPHEN 325 MG/1
650 TABLET ORAL EVERY 6 HOURS PRN
COMMUNITY

## 2022-10-28 RX ORDER — PEN NEEDLE, DIABETIC 32GX 5/32"
NEEDLE, DISPOSABLE MISCELLANEOUS 4 TIMES DAILY
Status: ON HOLD | COMMUNITY
Start: 2022-10-21 | End: 2022-11-15

## 2022-10-28 RX ORDER — SACUBITRIL AND VALSARTAN 24; 26 MG/1; MG/1
1 TABLET, FILM COATED ORAL 2 TIMES DAILY
Status: ON HOLD | COMMUNITY
End: 2022-11-15 | Stop reason: SINTOL

## 2022-10-28 RX ORDER — AMOXICILLIN 250 MG
1 CAPSULE ORAL DAILY
Status: ON HOLD | COMMUNITY
End: 2022-11-15

## 2022-10-28 RX ORDER — HYDRALAZINE HYDROCHLORIDE 10 MG/1
10 TABLET, FILM COATED ORAL EVERY 8 HOURS
COMMUNITY
End: 2022-11-22

## 2022-10-28 NOTE — PROGRESS NOTES
Chief Complaint   Patient presents with   • Establish Care          • Chest Pain     [facility] ED f/u Admission 10/2/2022 - Discharge 10/11/2022    • Headache   • Dizziness     After medications    Current outpatient and discharge medications have been reconciled for the patient.  Reviewed by: Mj Kennedy DO    HPI:  Jermaine Singh is a 77 y.o. male who presents today for hospital follow-up.  He is unable to get an appointment with PCP until March of next year.  Diagnosed with acute heart failure.  Reports more fatigue since discharge from rehab.  Physical therapy is scheduled later today    ROS:  Constitutional: no fevers, night sweats or unexplained weight loss  Eyes: no vision changes  ENT: no runny nose, ear pain, sore throat  Cardio: no chest pain, palpitations  Pulm: no shortness of breath, wheezing, or cough  GI: no abdominal pain or changes in bowel movements  : no difficulty urinating  MSK: no difficulty ambulating, no joint pain  Neuro: no weakness, dizziness or headache  Psych: no trouble sleeping  Endo: no change in appetite      Past Medical History:   Diagnosis Date   • Asthma    • Coronary artery disease    • Diabetes mellitus (HCC) 2000    started on inuslin 12/2018; started on po meds in 2000; checking blood sugars daily    • Disease of thyroid gland     po meds daily for hypothyroidism    • History of fracture as a child     rt leg- severe    • Hyperlipidemia    • Hypertension    • Hypothyroidism    • Peripheral neuropathy    • Peripheral vascular disease (HCC)     s/p angiogram 2/19-needs stent in left leg    • RBBB    • Right knee pain    • Vitamin D deficiency       Family History   Problem Relation Age of Onset   • Coronary artery disease Mother    • Diabetes Mother    • Cancer Father       Social History     Socioeconomic History   • Marital status:    • Number of children: 2   Tobacco Use   • Smoking status: Never   • Smokeless tobacco: Never   Vaping Use   • Vaping Use:  Never used   Substance and Sexual Activity   • Alcohol use: No   • Drug use: No   • Sexual activity: Defer      Allergies   Allergen Reactions   • Vancomycin Other (See Comments)     Kidney failure per last admission      Immunization History   Administered Date(s) Administered   • COVID-19 (PFIZER) PURPLE CAP 01/16/2021   • FLUAD TRI 65YR+ 10/12/2018   • Hepatitis A 11/12/2018, 06/27/2019   • Pneumococcal Conjugate 13-Valent (PCV13) 10/12/2018   • Shingrix 06/27/2019, 10/22/2019        PE:  Vitals:    10/28/22 0959   BP: 128/74   Pulse: 60   SpO2: 97%      Body mass index is 30.5 kg/m².    Gen Appearance: NAD  HEENT: Normocephalic, PERRLA, no thyromegaly, trache midline  Heart: RRR, normal S1 and S2, no murmur  Lungs: CTA b/l, no wheezing, no crackles  Abdomen: Soft, non-tender, non-distended, no guarding and BSx4  MSK: Moves all extremities well, normal gait, no peripheral edema  Pulses: Palpable and equal b/l  Lymph nodes: No palpable lymphadenopathy   Neuro: No focal deficits      Current Outpatient Medications   Medication Sig Dispense Refill   • acetaminophen (TYLENOL) 325 MG tablet Take 2 tablets by mouth Every 6 (Six) Hours As Needed for Mild Pain.     • apixaban (ELIQUIS) 5 MG tablet tablet Take 1 tablet by mouth Every 12 (Twelve) Hours. Indications: DVT/PE (active thrombosis) 180 tablet 0   • aspirin 81 MG chewable tablet Chew 81 mg Daily.     • atorvastatin (LIPITOR) 20 MG tablet Take 1 tablet by mouth Every Night. 90 tablet    • bethanechol (URECHOLINE) 5 MG tablet Take 2 tablets by mouth 3 (Three) Times a Day. 180 tablet 0   • bumetanide (BUMEX) 1 MG tablet Take 1 tablet by mouth Daily.     • carvedilol (COREG) 3.125 MG tablet Take 1 tablet by mouth 2 (Two) Times a Day With Meals.     • Cholecalciferol (VITAMIN D3) 5000 units tablet tablet Take 5,000 Units by mouth Daily.     • [START ON 11/3/2022] doxycycline (MONODOX) 100 MG capsule Take 1 capsule by mouth Every 12 (Twelve) Hours for 4 doses.  Indications: Infection of the Skin and/or Soft Tissue 4 capsule 0   • famotidine (PEPCID) 20 MG tablet Take 1 tablet by mouth Daily.     • hydrALAZINE (APRESOLINE) 10 MG tablet Take 1 tablet by mouth Every 8 (Eight) Hours.     • insulin aspart (novoLOG FLEXPEN) 100 UNIT/ML solution pen-injector sc pen Inject 3 Units under the skin into the appropriate area as directed Daily.     • insulin detemir (LEVEMIR) 100 UNIT/ML injection Inject 8 Units under the skin into the appropriate area as directed Every Night.  12   • levothyroxine (SYNTHROID, LEVOTHROID) 75 MCG tablet Take 1 tablet by mouth Every Morning.     • magnesium oxide (MAG-OX) 400 MG tablet Take 400 mg by mouth Daily.     • MM Pen Needles 32G X 4 MM misc 4 (Four) Times a Day.     • Probiotic Product (PROBIOTIC DAILY PO) Take  by mouth.     • sacubitril-valsartan (Entresto) 24-26 MG tablet Take 1 tablet by mouth 2 (Two) Times a Day.     • sennosides-docusate (senna-docusate sodium) 8.6-50 MG per tablet Take 1 tablet by mouth Daily.     • tamsulosin (FLOMAX) 0.4 MG capsule 24 hr capsule Take 1 capsule by mouth Daily. 30 capsule 0   • Diclofenac Sodium (VOLTAREN) 1 % gel gel Apply 2 g topically to the appropriate area as directed 4 (Four) Times a Day As Needed (pain).     • docusate sodium 100 MG capsule Take 1 capsule by mouth 2 (Two) Times a Day.     • nitroglycerin (NITROSTAT) 0.4 MG SL tablet Place 1 tablet under the tongue Every 5 (Five) Minutes As Needed for Chest Pain. Take no more than 3 doses in 15 minutes.  12   • saccharomyces boulardii (FLORASTOR) 250 MG capsule Take 1 capsule by mouth 2 (Two) Times a Day for 28 days. Take through 11/8/22 56 capsule 0     No current facility-administered medications for this visit.     Facility-Administered Medications Ordered in Other Visits   Medication Dose Route Frequency Provider Last Rate Last Admin   • Chlorhexidine Gluconate Cloth 2 % pads 1 application  1 application Topical Q12H PRN Mohan Arauz, PA           Recommend follow-up with cardiology as scheduled.  He reports some dizziness at home.  Suspect Entresto may have been started during rehab?  No change in blood pressure medicine today.  Recommend continued physical therapy, Occupational Therapy and speech therapy.    Diabetes is well controlled, established with [facility].    We did have an extensive discussion about his medication list.    Diagnoses and all orders for this visit:    1. Coronary artery disease involving native coronary artery of native heart without angina pectoris (Primary)  -     CBC & Differential; Future  -     Comprehensive Metabolic Panel; Future  -     Lipid Panel; Future  -     TSH+Free T4; Future  -     PSA Screen; Future  -     Vitamin D,25-Hydroxy; Future    2. SSS (sick sinus syndrome) (HCC)  -     CBC & Differential; Future  -     Comprehensive Metabolic Panel; Future  -     Lipid Panel; Future  -     TSH+Free T4; Future  -     PSA Screen; Future  -     Vitamin D,25-Hydroxy; Future    3. 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 Au  -     CBC & Differential; Future  -     Comprehensive Metabolic Panel; Future  -     Lipid Panel; Future  -     TSH+Free T4; Future  -     PSA Screen; Future  -     Vitamin D,25-Hydroxy; Future    4. Mixed hyperlipidemia  -     CBC & Differential; Future  -     Comprehensive Metabolic Panel; Future  -     Lipid Panel; Future  -     TSH+Free T4; Future  -     PSA Screen; Future  -     Vitamin D,25-Hydroxy; Future    5. Primary hypertension  -     CBC & Differential; Future  -     Comprehensive Metabolic Panel; Future  -     Lipid Panel; Future  -     TSH+Free T4; Future  -     PSA Screen; Future  -     Vitamin D,25-Hydroxy; Future    6. Vitamin D deficiency  -     CBC & Differential; Future  -     Comprehensive Metabolic Panel; Future  -     Lipid Panel; Future  -     TSH+Free T4; Future  -     PSA Screen; Future  -     Vitamin D,25-Hydroxy; Future    7. Encounter for prostate cancer screening  -      CBC & Differential; Future  -     Comprehensive Metabolic Panel; Future  -     Lipid Panel; Future  -     TSH+Free T4; Future  -     PSA Screen; Future  -     Vitamin D,25-Hydroxy; Future         No follow-ups on file.     Dictated Utilizing Dragon Dictation    Please note that portions of this note were completed with a voice recognition program.    Part of this note may be an electronic transcription/translation of spoken language to printed text using the Dragon Dictation System.

## 2022-10-31 DIAGNOSIS — R97.20 ELEVATED PSA, GREATER THAN OR EQUAL TO 20 NG/ML: Primary | ICD-10-CM

## 2022-11-02 ENCOUNTER — TELEPHONE (OUTPATIENT)
Dept: FAMILY MEDICINE CLINIC | Facility: CLINIC | Age: 77
End: 2022-11-02

## 2022-11-02 NOTE — TELEPHONE ENCOUNTER
MR. HAWTHORNE CALLED AND WAS GIVING AN UPDATE THAT THE PT WEIGHED IN , AND THAT IS 15 LBS OVER THE LIMIT THAT WAS SET FOR HIM.

## 2022-11-03 ENCOUNTER — TELEPHONE (OUTPATIENT)
Dept: CARDIAC SURGERY | Facility: CLINIC | Age: 77
End: 2022-11-03

## 2022-11-03 NOTE — TELEPHONE ENCOUNTER
Uma, with [facility], called to get verbal orders to continue home health with patient. OK'd continuing home health and asked her to call with any further questions or concerns. She verbalized understanding and agreed.

## 2022-11-04 RX ORDER — BETHANECHOL CHLORIDE 5 MG
10 TABLET ORAL 3 TIMES DAILY
Qty: 180 TABLET | Refills: 0 | Status: CANCELLED | OUTPATIENT
Start: 2022-11-04

## 2022-11-07 ENCOUNTER — LAB (OUTPATIENT)
Dept: LAB | Facility: HOSPITAL | Age: 77
End: 2022-11-07

## 2022-11-07 ENCOUNTER — OFFICE VISIT (OUTPATIENT)
Dept: FAMILY MEDICINE CLINIC | Facility: CLINIC | Age: 77
End: 2022-11-07

## 2022-11-07 ENCOUNTER — TRANSCRIBE ORDERS (OUTPATIENT)
Dept: LAB | Facility: HOSPITAL | Age: 77
End: 2022-11-07

## 2022-11-07 VITALS
WEIGHT: 239 LBS | SYSTOLIC BLOOD PRESSURE: 130 MMHG | HEART RATE: 78 BPM | OXYGEN SATURATION: 98 % | HEIGHT: 75 IN | DIASTOLIC BLOOD PRESSURE: 78 MMHG | BODY MASS INDEX: 29.72 KG/M2

## 2022-11-07 DIAGNOSIS — E78.5 HYPERLIPIDEMIA, UNSPECIFIED HYPERLIPIDEMIA TYPE: Primary | ICD-10-CM

## 2022-11-07 DIAGNOSIS — I10 PRIMARY HYPERTENSION: ICD-10-CM

## 2022-11-07 DIAGNOSIS — R33.9 URINARY RETENTION: ICD-10-CM

## 2022-11-07 DIAGNOSIS — I10 ESSENTIAL HYPERTENSION, MALIGNANT: ICD-10-CM

## 2022-11-07 DIAGNOSIS — I25.10 CORONARY ARTERY DISEASE INVOLVING NATIVE CORONARY ARTERY OF NATIVE HEART WITHOUT ANGINA PECTORIS: ICD-10-CM

## 2022-11-07 DIAGNOSIS — N50.82 SCROTUM PAIN: ICD-10-CM

## 2022-11-07 DIAGNOSIS — E78.5 HYPERLIPIDEMIA, UNSPECIFIED HYPERLIPIDEMIA TYPE: ICD-10-CM

## 2022-11-07 DIAGNOSIS — Z95.1 S/P CABG X 3: Chronic | ICD-10-CM

## 2022-11-07 DIAGNOSIS — B49 FUNGAL INFECTION: ICD-10-CM

## 2022-11-07 DIAGNOSIS — R97.20 ELEVATED PSA, GREATER THAN OR EQUAL TO 20 NG/ML: Primary | ICD-10-CM

## 2022-11-07 PROCEDURE — 99214 OFFICE O/P EST MOD 30 MIN: CPT | Performed by: INTERNAL MEDICINE

## 2022-11-07 RX ORDER — NYSTATIN 100000 [USP'U]/G
POWDER TOPICAL 3 TIMES DAILY
Qty: 30 G | Refills: 0 | Status: SHIPPED | OUTPATIENT
Start: 2022-11-07 | End: 2022-11-22 | Stop reason: SDUPTHER

## 2022-11-07 RX ORDER — BETHANECHOL CHLORIDE 10 MG/1
10 TABLET ORAL 3 TIMES DAILY
Qty: 90 TABLET | Refills: 3 | Status: SHIPPED | OUTPATIENT
Start: 2022-11-07 | End: 2022-11-07

## 2022-11-07 NOTE — PROGRESS NOTES
Chief Complaint   Patient presents with   • Elevated PSA     Discuss lab results   • Testicle Pain     Rash on testicles. Uses baby powder that helps for short term.        HPI:  Jermaine Singh is a 77 y.o. male who presents today for follow-up of elevated PSA.  He would like to discuss test results and plan of care.  He reports scrotum pain over the last few weeks as well.    ROS:  Constitutional: no fevers, night sweats or unexplained weight loss  Eyes: no vision changes  ENT: no runny nose, ear pain, sore throat  Cardio: no chest pain, palpitations  Pulm: no shortness of breath, wheezing, or cough  GI: no abdominal pain or changes in bowel movements  : no difficulty urinating  MSK: no difficulty ambulating, no joint pain  Neuro: no weakness, dizziness or headache  Psych: no trouble sleeping  Endo: no change in appetite      Past Medical History:   Diagnosis Date   • Allergic    • Arthritis    • Asthma    • Coronary artery disease    • Diabetes mellitus (HCC) 2000    started on inuslin 12/2018; started on po meds in 2000; checking blood sugars daily    • Disease of thyroid gland     po meds daily for hypothyroidism    • History of fracture as a child     rt leg- severe    • Hyperlipidemia    • Hypertension    • Hypothyroidism    • Peripheral neuropathy    • Peripheral vascular disease (HCC)     s/p angiogram 2/19-needs stent in left leg    • RBBB    • Right knee pain    • Vitamin D deficiency       Family History   Problem Relation Age of Onset   • Coronary artery disease Mother    • Diabetes Mother    • Cancer Father       Social History     Socioeconomic History   • Marital status:    • Number of children: 2   Tobacco Use   • Smoking status: Never   • Smokeless tobacco: Never   Vaping Use   • Vaping Use: Never used   Substance and Sexual Activity   • Alcohol use: No   • Drug use: No   • Sexual activity: Defer      Allergies   Allergen Reactions   • Vancomycin Other (See Comments)     Kidney  failure per last admission      Immunization History   Administered Date(s) Administered   • COVID-19 (PFIZER) PURPLE CAP 01/16/2021   • FLUAD TRI 65YR+ 10/12/2018   • Hepatitis A 11/12/2018, 06/27/2019   • Pneumococcal Conjugate 13-Valent (PCV13) 10/12/2018   • Shingrix 06/27/2019, 10/22/2019        PE:  Vitals:    11/07/22 1047   BP: 130/78   Pulse: 78   SpO2: 98%      Body mass index is 29.87 kg/m².    Gen Appearance: NAD  HEENT: Normocephalic, PERRLA, no thyromegaly, trache midline  Heart: RRR, normal S1 and S2, no murmur  Lungs: CTA b/l, no wheezing, no crackles  Abdomen: Soft, non-tender, non-distended, no guarding and BSx4  MSK: Moves all extremities well, normal gait, no peripheral edema  Pulses: Palpable and equal b/l  Lymph nodes: No palpable lymphadenopathy   Neuro: No focal deficits      Current Outpatient Medications   Medication Sig Dispense Refill   • acetaminophen (TYLENOL) 325 MG tablet Take 2 tablets by mouth Every 6 (Six) Hours As Needed for Mild Pain.     • apixaban (ELIQUIS) 5 MG tablet tablet Take 1 tablet by mouth Every 12 (Twelve) Hours. Indications: DVT/PE (active thrombosis) 180 tablet 0   • aspirin 81 MG chewable tablet Chew 81 mg Daily.     • atorvastatin (LIPITOR) 20 MG tablet Take 1 tablet by mouth Every Night. 90 tablet    • bumetanide (BUMEX) 1 MG tablet Take 1 tablet by mouth Daily. (Patient taking differently: Take 2 tablets by mouth Daily.)     • carvedilol (COREG) 3.125 MG tablet Take 1 tablet by mouth 2 (Two) Times a Day With Meals.     • Cholecalciferol (VITAMIN D3) 5000 units tablet tablet Take 5,000 Units by mouth Daily.     • Diclofenac Sodium (VOLTAREN) 1 % gel gel Apply 2 g topically to the appropriate area as directed 4 (Four) Times a Day As Needed (pain).     • docusate sodium 100 MG capsule Take 1 capsule by mouth 2 (Two) Times a Day.     • famotidine (PEPCID) 20 MG tablet Take 1 tablet by mouth Daily.     • hydrALAZINE (APRESOLINE) 10 MG tablet Take 1 tablet by mouth  Every 8 (Eight) Hours.     • insulin aspart (novoLOG FLEXPEN) 100 UNIT/ML solution pen-injector sc pen Inject 3 Units under the skin into the appropriate area as directed Daily.     • insulin detemir (LEVEMIR) 100 UNIT/ML injection Inject 8 Units under the skin into the appropriate area as directed Every Night.  12   • levothyroxine (SYNTHROID, LEVOTHROID) 75 MCG tablet Take 1 tablet by mouth Every Morning.     • magnesium oxide (MAG-OX) 400 MG tablet Take 400 mg by mouth Daily.     • MM Pen Needles 32G X 4 MM misc 4 (Four) Times a Day.     • nitroglycerin (NITROSTAT) 0.4 MG SL tablet Place 1 tablet under the tongue Every 5 (Five) Minutes As Needed for Chest Pain. Take no more than 3 doses in 15 minutes.  12   • Probiotic Product (PROBIOTIC DAILY PO) Take  by mouth.     • saccharomyces boulardii (FLORASTOR) 250 MG capsule Take 1 capsule by mouth 2 (Two) Times a Day for 28 days. Take through 11/8/22 56 capsule 0   • sacubitril-valsartan (Entresto) 24-26 MG tablet Take 1 tablet by mouth 2 (Two) Times a Day.     • sennosides-docusate (PERICOLACE) 8.6-50 MG per tablet Take 1 tablet by mouth Daily.     • tamsulosin (FLOMAX) 0.4 MG capsule 24 hr capsule Take 1 capsule by mouth Daily. 30 capsule 0   • nystatin (MYCOSTATIN) 305653 UNIT/GM powder Apply  topically to the appropriate area as directed 3 (Three) Times a Day. 30 g 0     No current facility-administered medications for this visit.     Facility-Administered Medications Ordered in Other Visits   Medication Dose Route Frequency Provider Last Rate Last Admin   • Chlorhexidine Gluconate Cloth 2 % pads 1 application  1 application Topical Q12H PRN Mohan Arauz, PA          Erythema of groin and scrotum on the right.  Recommend treating fungal infection initially.  Follow-up with urology as scheduled for elevated PSA.     Diagnoses and all orders for this visit:    1. Elevated PSA, greater than or equal to 20 ng/ml (Primary)  Follow-up with urology as  scheduled.  2. Scrotum pain    3. Fungal infection  -     nystatin (MYCOSTATIN) 332746 UNIT/GM powder; Apply  topically to the appropriate area as directed 3 (Three) Times a Day.  Dispense: 30 g; Refill: 0  If no improvement would recommend Diflucan.  4. Urinary retention  -     Discontinue: bethanechol (URECHOLINE) 10 MG tablet; Take 1 tablet by mouth 3 (Three) Times a Day.  Dispense: 90 tablet; Refill: 3    5. 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 Au    6. Coronary artery disease involving native coronary artery of native heart without angina pectoris    7. Primary hypertension  Well-controlled today.  No change in prescription medication.       No follow-ups on file.     Dictated Utilizing Dragon Dictation    Please note that portions of this note were completed with a voice recognition program.    Part of this note may be an electronic transcription/translation of spoken language to printed text using the Dragon Dictation System.

## 2022-11-09 ENCOUNTER — OFFICE VISIT (OUTPATIENT)
Dept: CARDIAC SURGERY | Facility: CLINIC | Age: 77
End: 2022-11-09

## 2022-11-09 VITALS
WEIGHT: 238.5 LBS | TEMPERATURE: 98.7 F | OXYGEN SATURATION: 96 % | HEIGHT: 75 IN | DIASTOLIC BLOOD PRESSURE: 68 MMHG | SYSTOLIC BLOOD PRESSURE: 122 MMHG | HEART RATE: 72 BPM | BODY MASS INDEX: 29.65 KG/M2

## 2022-11-09 DIAGNOSIS — I25.10 CORONARY ARTERY DISEASE INVOLVING NATIVE CORONARY ARTERY OF NATIVE HEART WITHOUT ANGINA PECTORIS: ICD-10-CM

## 2022-11-09 DIAGNOSIS — I73.9 PERIPHERAL VASCULAR DISEASE: ICD-10-CM

## 2022-11-09 DIAGNOSIS — I96 GANGRENE: Primary | ICD-10-CM

## 2022-11-09 PROCEDURE — 99024 POSTOP FOLLOW-UP VISIT: CPT | Performed by: THORACIC SURGERY (CARDIOTHORACIC VASCULAR SURGERY)

## 2022-11-09 NOTE — PROGRESS NOTES
"Patient Information  Jermaine Singh                                                                                          [address] [place] [address]      1945  [unfilled]  [unfilled]    Chief Complaint   Patient presents with   • Follow-up     3 week follow up for eval of incision left great toe transmetatarsal 8/2/22.- gangrene- Pt states that the swelling has gone down almost altogether. No complaints of pain, pt states that the neuropathy in both of his feet.         History of Present Illness: Patient seen today in follow-up of left great toe transmetatarsal amputation August 2, 2022.  This is done for diabetic ulceration/osteomyelitis.  He has some dehiscence of the middle/distal portion of the incision there is been healing by secondary intent unfortunately healing process has been somewhat difficult due to his severe congestive heart failure secondary to coronary artery disease myocardial ischemia.  When he was last admitted to [facility] for his heart failure he was found to have a ulceration of the middle phalangeal joint space of the left second toe from a hammertoe phenomena due to his heart failure we elected to treat that conservatively with just local wound care Dr. Apodaca is managing his heart failure and following very closely.    Vitals:    11/09/22 0946   BP: 122/68   Pulse: 72   Temp: 98.7 °F (37.1 °C)   SpO2: 96%   Weight: 108 kg (238 lb 8 oz)   Height: 190.5 cm (75\")        Physical Exam almost complete closure of this left great toe amputation site with some still superficial dehiscence at the most distal portion.  The hammertoe phenomena/ulceration on the dorsal aspect of the left second toe is stationary at this time with exposed joint space.    Lab/other results:    Assessment: #1.  Status post left great toe transmetatarsal amputation primary closure August 2, 2022 for ulceration/osteomyelitis.  Had postop wound dehiscence but this is almost completely " epithelialized at this time  2.  Ulceration of the middle phalangeal joint space of the left second toe secondary to hammertoe phenomena.  Has exposed metatarsal joint space.  #3.  Chronic congestive heart failure being managed by Dr. Apodaca of an cardiology.  Plan: We will plan to see the patient back in 6 weeks for follow-up visit his wife is to continue Betadine swabbing to the dehisced amputation site of the left great toe as well as to the dehisced/exposed middle phalangeal joint space of the hammertoe phenomena of the left second toe.  Upon swabbing she is supplied a 6 x 6 Optifoam dressing.  Overall management of heart failure per Dr. Paulie Márquez M.D.

## 2022-11-10 ENCOUNTER — OFFICE VISIT (OUTPATIENT)
Dept: UROLOGY | Facility: CLINIC | Age: 77
End: 2022-11-10

## 2022-11-10 VITALS — HEIGHT: 75 IN | WEIGHT: 238 LBS | BODY MASS INDEX: 29.59 KG/M2

## 2022-11-10 DIAGNOSIS — R97.20 ELEVATED PROSTATE SPECIFIC ANTIGEN (PSA): Primary | ICD-10-CM

## 2022-11-10 PROCEDURE — 99204 OFFICE O/P NEW MOD 45 MIN: CPT | Performed by: UROLOGY

## 2022-11-10 NOTE — PROGRESS NOTES
Elevated PSA Office Visit      Patient Name: Jermaine Singh  : 1945   MRN: [id number]     Chief Complaint:  Elevated PSA.    Chief Complaint   Patient presents with   • Elevated PSA       Referring Provider: Mj Kennedy, *    History of Present Illness: Jermaine Singh is a 77 y.o. male who presents today with history of elevated PSA.  He reports he had a PSA drawn in September that was around 10.  In July he fell and had a gangrenous toe.  He had his toe amputated and then subsequently went into CHF.  He was in rehab for a period and then went back into CHF.  He reports no lower urinary tract symptoms.  He has frequency but he is on diuretics for his fluid retention.  When he was admitted he did have a catheter in place.  He reports the catheter came out approximately 1 month ago.  He also has CKD.  He is on tamsulosin.  He reports he has never been in urinary retention.  He currently has another toe with exposed bone and infection, however, he is not healthy enough for amputation.     He has never been to a urologist before.    It is unclear why his PSA was checked in his current state of health.      Lab Results   Component Value Date    PSA 20.200 (H) 10/28/2022       Subjective      Review of System:   Review of Systems   Constitutional: Positive for fatigue. Negative for chills, fever and unexpected weight change.   HENT: Negative for sore throat.    Eyes: Negative for visual disturbance.   Respiratory: Negative for cough, chest tightness and shortness of breath.    Cardiovascular: Positive for leg swelling. Negative for chest pain.   Gastrointestinal: Negative.  Negative for blood in stool, constipation, diarrhea, nausea, rectal pain and vomiting.   Genitourinary: Positive for frequency. Negative for decreased urine volume, difficulty urinating, dysuria, enuresis, flank pain, genital sores, hematuria and urgency.   Musculoskeletal: Negative.  Negative for back pain and joint swelling.    Skin: Negative.  Negative for rash and wound.   Neurological: Negative.  Negative for seizures, speech difficulty, weakness and headaches.   Psychiatric/Behavioral: Negative.  Negative for confusion, sleep disturbance and suicidal ideas. The patient is not nervous/anxious.       I have reviewed the ROS documented by my clinical staff, I have updated appropriately and I agree. Rudy Dixon MD    Past Medical History:   Past Medical History:   Diagnosis Date   • Allergic    • Arthritis    • Asthma    • Coronary artery disease    • Diabetes mellitus (HCC) 2000    started on inuslin 12/2018; started on po meds in 2000; checking blood sugars daily    • Disease of thyroid gland     po meds daily for hypothyroidism    • History of fracture as a child     rt leg- severe    • Hyperlipidemia    • Hypertension    • Hypothyroidism    • Peripheral neuropathy    • Peripheral vascular disease (HCC)     s/p angiogram 2/19-needs stent in left leg    • RBBB    • Right knee pain    • Vitamin D deficiency        Past Surgical History:   Past Surgical History:   Procedure Laterality Date   • APPENDECTOMY     • CARDIAC CATHETERIZATION N/A 2/14/2019    Procedure: Left Heart Cath;  Surgeon: Cooper Apodaca MD;  Location:  [facility] IN[facility]ASIVE LOCATION;  Service: Cardiology   • CARDIAC ELECTROPHYSIOLOGY PROCEDURE N/A 5/18/2022    Procedure: DEVICE IMPLANT;  Surgeon: Cooper Apodaca MD;  Location:  [facility] CATH INVASIVE LOCATION;  Service: Cardiology;  Laterality: N/A;   • COLONOSCOPY     • CORONARY ARTERY BYPASS GRAFT N/A 3/22/2019    Procedure: MEDIAN STERNOTOMY, CORONARY ARTERY BYPASS GRAFT X3, UTILIZING THE LEFT INTERNAL MAMMARY ARTERY, EVH AND OPEN HARVEST OF THE RIGHT GREATER SAPHENOUS VEIN, EXPLORATION OF THE LEFT LEG;  Surgeon: Ap Au MD;  Location:  [facility];  Service: Cardiothoracic   • EYE SURGERY Bilateral     cataracts    • INTERVENTIONAL RADIOLOGY PROCEDURE N/A 7/29/2021    Procedure: Abdominal  Aortagram with Runoff;  Surgeon: Jermaine Hernandez MD;  Location:  [facility]ATION;  Service: Cardiovascular;  Laterality: N/A;   • KNEE ARTHROSCOPY      right x 2, left x 1   • LACERATION REPAIR      right leg   • LEG SURGERY      2 for fracture of rt leg    • TONSILLECTOMY      Adnoidectomy   • TRANS METATARSAL AMPUTATION Left 8/2/2022    Procedure: GREAT TOE AMPUTATION LEFT;  Surgeon: Gopal Márquez MD;  Location:  [facility];  Service: Vascular;  Laterality: Left;       Family History:   Family History   Problem Relation Age of Onset   • Coronary artery disease Mother    • Diabetes Mother    • Cancer Father        Social History:   Social History     Socioeconomic History   • Marital status:    • Number of children: 2   Tobacco Use   • Smoking status: Never   • Smokeless tobacco: Never   Vaping Use   • Vaping Use: Never used   Substance and Sexual Activity   • Alcohol use: No   • Drug use: No   • Sexual activity: Defer       Medications:     Current Outpatient Medications:   •  acetaminophen (TYLENOL) 325 MG tablet, Take 2 tablets by mouth Every 6 (Six) Hours As Needed for Mild Pain., Disp: , Rfl:   •  apixaban (ELIQUIS) 5 MG tablet tablet, Take 1 tablet by mouth Every 12 (Twelve) Hours. Indications: DVT/PE (active thrombosis), Disp: 180 tablet, Rfl: 0  •  aspirin 81 MG chewable tablet, Chew 81 mg Daily., Disp: , Rfl:   •  atorvastatin (LIPITOR) 20 MG tablet, Take 1 tablet by mouth Every Night., Disp: 90 tablet, Rfl:   •  bumetanide (BUMEX) 1 MG tablet, Take 1 tablet by mouth Daily. (Patient taking differently: Take 2 tablets by mouth Daily.), Disp: , Rfl:   •  carvedilol (COREG) 3.125 MG tablet, Take 1 tablet by mouth 2 (Two) Times a Day With Meals., Disp: , Rfl:   •  Cholecalciferol (VITAMIN D3) 5000 units tablet tablet, Take 5,000 Units by mouth Daily., Disp: , Rfl:   •  Diclofenac Sodium (VOLTAREN) 1 % gel gel, Apply 2 g topically to the appropriate area as directed 4 (Four) Times a Day  As Needed (pain)., Disp: , Rfl:   •  docusate sodium 100 MG capsule, Take 1 capsule by mouth 2 (Two) Times a Day., Disp: , Rfl:   •  famotidine (PEPCID) 20 MG tablet, Take 1 tablet by mouth Daily., Disp: , Rfl:   •  hydrALAZINE (APRESOLINE) 10 MG tablet, Take 1 tablet by mouth Every 8 (Eight) Hours., Disp: , Rfl:   •  insulin aspart (novoLOG FLEXPEN) 100 UNIT/ML solution pen-injector sc pen, Inject 3 Units under the skin into the appropriate area as directed Daily., Disp: , Rfl:   •  insulin detemir (LEVEMIR) 100 UNIT/ML injection, Inject 8 Units under the skin into the appropriate area as directed Every Night., Disp: , Rfl: 12  •  levothyroxine (SYNTHROID, LEVOTHROID) 75 MCG tablet, Take 1 tablet by mouth Every Morning., Disp: , Rfl:   •  magnesium oxide (MAG-OX) 400 MG tablet, Take 400 mg by mouth Daily., Disp: , Rfl:   •  MM Pen Needles 32G X 4 MM misc, 4 (Four) Times a Day., Disp: , Rfl:   •  nitroglycerin (NITROSTAT) 0.4 MG SL tablet, Place 1 tablet under the tongue Every 5 (Five) Minutes As Needed for Chest Pain. Take no more than 3 doses in 15 minutes., Disp: , Rfl: 12  •  nystatin (MYCOSTATIN) 417902 UNIT/GM powder, Apply  topically to the appropriate area as directed 3 (Three) Times a Day., Disp: 30 g, Rfl: 0  •  Probiotic Product (PROBIOTIC DAILY PO), Take  by mouth., Disp: , Rfl:   •  sacubitril-valsartan (Entresto) 24-26 MG tablet, Take 1 tablet by mouth 2 (Two) Times a Day., Disp: , Rfl:   •  sennosides-docusate (PERICOLACE) 8.6-50 MG per tablet, Take 1 tablet by mouth Daily. PRN, Disp: , Rfl:   •  tamsulosin (FLOMAX) 0.4 MG capsule 24 hr capsule, Take 1 capsule by mouth Daily., Disp: 30 capsule, Rfl: 0  No current facility-administered medications for this visit.    Facility-Administered Medications Ordered in Other Visits:   •  Chlorhexidine Gluconate Cloth 2 % pads 1 application, 1 application, Topical, Q12H PRN, Mohan Arauz PA    Allergies:   Allergies   Allergen Reactions   • Vancomycin  "Other (See Comments)     Kidney failure per last admission       IPSS Questionnaire (AUA-7):  Over the past month…    1)  Incomplete Emptying  How often have you had a sensation of not emptying your bladder?  0 - Not at all   2)  Frequency  How often have you had to urinate less than every two hours? 2 - Less than half the time   3)  Intermittency  How often have you found you stopped and started again several times when you urinated?  0 - Not at all   4) Urgency  How often have you found it difficult to postpone urination?  5 - Almost always   5) Weak Stream  How often have you had a weak urinary stream?  0 - Not at all   6) Straining  How often have you had to push or strain to begin urination?  0 - Not at all   7) Nocturia  How many times did you typically get up at night to urinate?  0 - None   Total Score:  7   The International Prostate Symptom Score (IPSS) is used to screen, diagnose, track symptoms of benign prostatic hyperplasia (BPH).    0-7 pts (Mild Symptoms)  / 8-19 pts (Moderate) / 20-35 (Severe)    Quality of life due to urinary symptoms:  If you were to spend the rest of your life with your urinary condition the way it is now, how would you feel about that? 3-Mixed   Urine Leakage (Incontinence) 0-No Leakage       Objective     Physical Exam:   Vital Signs:   Vitals:    11/10/22 1312   Weight: 108 kg (238 lb)   Height: 190.5 cm (75\")     Body mass index is 29.75 kg/m².     Physical Exam  Vitals and nursing note reviewed.   Constitutional:       General: He is awake. He is not in acute distress.     Appearance: Normal appearance. He is well-developed. He is obese.   HENT:      Head: Normocephalic and atraumatic.      Right Ear: External ear normal.      Left Ear: External ear normal.      Nose: Nose normal.   Eyes:      Conjunctiva/sclera: Conjunctivae normal.   Pulmonary:      Effort: Pulmonary effort is normal.   Abdominal:      General: There is no distension.      Palpations: Abdomen is soft. " There is no mass.      Tenderness: There is no abdominal tenderness. There is no right CVA tenderness, left CVA tenderness, guarding or rebound.      Hernia: No hernia is present. There is no hernia in the left inguinal area or right inguinal area.   Genitourinary:     Pubic Area: No rash.       Penis: Normal.       Testes: Normal.      Prostate: Normal.      Rectum: Normal. No mass or tenderness. Normal anal tone.   Musculoskeletal:      Cervical back: Normal range of motion.   Lymphadenopathy:      Lower Body: No right inguinal adenopathy. No left inguinal adenopathy.   Skin:     General: Skin is warm.   Neurological:      General: No focal deficit present.      Mental Status: He is alert and oriented to person, place, and time.   Psychiatric:         Behavior: Behavior normal. Behavior is cooperative.         Labs:   Hematocrit (%)   Date Value   10/28/2022 31.8 (L)   10/08/2022 34.4 (L)   10/06/2022 34.3 (L)   10/05/2022 35.9 (L)   10/04/2022 32.9 (L)   10/03/2022 30.8 (L)       Lab Results   Component Value Date    PSA 20.200 (H) 10/28/2022       Images:   XR Shoulder 2+ View Left    Result Date: 7/27/2022  1. Reduction of the glenohumeral dislocation, now in anatomic alignment. No evidence of osseous Bankart fracture.  This report was finalized on 7/27/2022 10:52 PM by Carlton Marinelli MD.      XR Shoulder 2+ View Left    Result Date: 7/27/2022  1. Left anterior glenohumeral dislocation. 2. No evidence of osseous Bankart or significant Hill-Sachs deformity.  This report was finalized on 7/27/2022 8:15 PM by Carlton Marinelli MD.      XR Foot 3+ View Left    Result Date: 7/27/2022  1. Acute appearing fracture involving the medial base of the proximal phalanx of the second digit with intra-articular extension into the second metatarsophalangeal joint. 2. Soft tissue swelling of the forefoot likely related to reported cellulitis. No osseous erosion to suggest acute osteomyelitis radiographically. 3. Chronic  appearing periosteal reaction involving the second through fourth metatarsal shafts.  This report was finalized on 7/27/2022 8:18 PM by Carlton Marinelli MD.      CT Chest Without Contrast Diagnostic    Result Date: 8/12/2022  Appearance most consistent with pulmonary edema, including small-to-moderate bilateral pleural effusions. Nonspecific pulmonary nodules are present anteriorly near the right lung base, measuring up to 10 mm. Consider nonemergent follow-up CT chest without acute process has resolved.  This report was finalized on 8/12/2022 6:05 PM by Jermaine Wang.      NM Bone Scan 3 Phase    Result Date: 7/29/2022  1. Three-phase positive bone scan involving the distal phalanx of the first digit which would be compatible with osteomyelitis in the correct clinical setting. 2. Three-phase positive uptake involving the second digit which is favored to relate to the acute fracture identified on radiograph. Osteomyelitis of the second digit is felt to be less likely.  This report was finalized on 7/29/2022 5:04 PM by Carlton Marinelli MD.      NM Lung Scan Perfusion Particulate    Result Date: 8/12/2022  Heterogeneous perfusion scan, which likely reflects the patient's pulmonary edema seen on concurrent chest radiograph. Study is considered low probability for pulmonary embolic disease.  This report was finalized on 8/12/2022 10:50 PM by Dr. Leodan Nelson MD.      XR Chest 1 View    Result Date: 10/2/2022  1. Small right pleural effusion. 2. Right basilar opacities could reflect atelectasis or pneumonia. Electronically signed by:  Eyad Jones M.D.  10/2/2022 4:25 AM Mountain Time    XR Chest 1 View    Result Date: 8/18/2022  1. Interval retraction of the right arm PICC now terminates within the right mid axillary region. 2. Persistent edema and/or infection. 3. Persistent small left pleural effusion. Electronically signed by:  John Morocho D.O.  8/18/2022 12:47 AM Mountain Time    XR Chest 1 View    Result  Date: 8/16/2022  Increasing airspace opacities and left basilar opacity with likely left effusion. Findings may be related to worsening pneumonia or edema.  Support devices as above. Electronically signed by:  Shanel Molina  8/16/2022 4:06 AM Mountain Time    XR Chest 1 View    Result Date: 8/14/2022  Increased patchy bibasilar opacities Cardiomegaly and pulmonary vascular congestion. Electronically signed by:  Melissa Flores M.D.  8/13/2022 11:44 PM Mountain Time    XR Chest 1 View    Result Date: 8/12/2022  Cardiomegaly with evidence of at least mild passive congestion likely early interstitial edema. Findings are suggestive of at least mild heart failure. There is a decrease in atelectasis in the lung bases compared with the last study.  This report was finalized on 8/12/2022 9:04 AM by Donny Cordoba MD.      XR Chest 1 View    Result Date: 8/10/2022  1.  Coarse bilateral reticular markings, which could reflect interstitial pulmonary edema or atypical pneumonia. 2.  Cardiomegaly.  This report was finalized on 8/10/2022 4:43 PM by Jf Cadet MD.      XR Chest 1 View    Result Date: 8/10/2022   1. Right arm approach PICC tip placement to the mid SVC level. No visible pneumothorax. 2. Mild diffuse interstitial thickening in both lungs may represent mild edema. 3. Stable cardiomegaly.  This report was finalized on 8/10/2022 1:22 PM by Praveena Pérez MD.      Duplex Renal Artery - Bilateral Complete CAR    Result Date: 8/16/2022  Impression: No sonographic evidence of clinically significant stenosis of the right or the left renal artery on this exam.  The resistive indices are elevated bilaterally that in combination with the findings suggest parenchymal renal disease.  Please note that the study is not optimized for evaluation of the right and left renal parenchyma. A separate dedicated ultrasound examination should be performed for that purpose.  This report was finalized on 8/16/2022 1:23 PM by Sinan Dinero  MD.      US Renal Bilateral    Result Date: 8/15/2022  Normal sonographic appearance of the kidneys, without evidence of hydronephrosis.  This report was finalized on 8/15/2022 4:21 PM by Jermaine Wang.      XR Abdomen KUB    Result Date: 8/11/2022  Normal colonic stool burden. No bowel distention.  This report was finalized on 8/11/2022 3:18 PM by Navneet Amin MD.      XR Abdomen KUB    Result Date: 8/9/2022  No acute process identified. Large amount of stool throughout colon.  This report was finalized on 8/9/2022 2:42 PM by Jf Cadet MD.        Measures:   Tobacco:   Jermaine Singh  reports that he has never smoked. He has never used smokeless tobacco..    Urine Incontinence: Patient reports that he is not currently experiencing any symptoms of urinary incontinence.         Assessment / Plan      Assessment/Plan:   Mr. Singh is a 77 y.o. male with elevated PSA.  He has acute congestive heart failure, is on anticoagulation, has significant cardiovascular and peripheral peripheral artery disease.  He has currently another toe that requires amputation, however, he is not healthy enough to undergo a toe amputation.  It is unclear why in this gentleman who likely has less than 10-year life expectancy a PSA was checked.  However, it has come back elevated.  His AYDE today was negative.  I will repeat his PSA in 3 months.  I had a long discussion with him today in regards to PSA as well as prostate cancer treatment and risks.  At this point, he is unsure whether he would want any treatment moving forward.  We will see what his PSA does in 3 months and make a decision based on this.  He has a pacemaker and it is unclear whether he would be able to get an MRI.  He is going to look for his implant card and bring it with him at his next appointment.       Diagnoses and all orders for this visit:    1. Elevated prostate specific antigen (PSA) (Primary)  -     PSA DIAGNOSTIC; Future        Patient Education:      Follow Up:   Return in about 3 months (around 2/10/2023) for Recheck.    I spent approximately 45 minutes providing clinical care for this patient; including review of patient's chart and provider documentation, face to face time spent with patient in examination room (obtaining history, performing physical exam, discussing diagnosis and management options), placing orders, and completing patient documentation.     Rudy Dixon MD  [facility] [place] [facility] [place]

## 2022-11-14 ENCOUNTER — HOSPITAL ENCOUNTER (INPATIENT)
Facility: HOSPITAL | Age: 77
LOS: 5 days | Discharge: HOME HEALTH | End: 2022-11-20
Attending: EMERGENCY MEDICINE | Admitting: INTERNAL MEDICINE

## 2022-11-14 ENCOUNTER — LAB (OUTPATIENT)
Dept: LAB | Facility: HOSPITAL | Age: 77
End: 2022-11-14

## 2022-11-14 ENCOUNTER — APPOINTMENT (OUTPATIENT)
Dept: CT IMAGING | Facility: HOSPITAL | Age: 77
End: 2022-11-14

## 2022-11-14 ENCOUNTER — APPOINTMENT (OUTPATIENT)
Dept: GENERAL RADIOLOGY | Facility: HOSPITAL | Age: 77
End: 2022-11-14

## 2022-11-14 ENCOUNTER — TRANSCRIBE ORDERS (OUTPATIENT)
Dept: LAB | Facility: HOSPITAL | Age: 77
End: 2022-11-14

## 2022-11-14 DIAGNOSIS — R97.20 ELEVATED PROSTATE SPECIFIC ANTIGEN (PSA): ICD-10-CM

## 2022-11-14 DIAGNOSIS — E78.5 HYPERLIPIDEMIA, UNSPECIFIED HYPERLIPIDEMIA TYPE: Primary | ICD-10-CM

## 2022-11-14 DIAGNOSIS — I10 ESSENTIAL HYPERTENSION, MALIGNANT: ICD-10-CM

## 2022-11-14 DIAGNOSIS — D63.8 ANEMIA, CHRONIC DISEASE: ICD-10-CM

## 2022-11-14 DIAGNOSIS — E55.9 VITAMIN D DEFICIENCY: ICD-10-CM

## 2022-11-14 DIAGNOSIS — R06.02 SHORTNESS OF BREATH: ICD-10-CM

## 2022-11-14 DIAGNOSIS — Z89.412 STATUS POST AMPUTATION OF GREAT TOE, LEFT: ICD-10-CM

## 2022-11-14 DIAGNOSIS — E66.9 OBESITY (BMI 30.0-34.9): ICD-10-CM

## 2022-11-14 DIAGNOSIS — I20.0 UNSTABLE ANGINA: ICD-10-CM

## 2022-11-14 DIAGNOSIS — Z87.448 HISTORY OF CHRONIC KIDNEY DISEASE: ICD-10-CM

## 2022-11-14 DIAGNOSIS — M86.272 SUBACUTE OSTEOMYELITIS OF LEFT FOOT: ICD-10-CM

## 2022-11-14 DIAGNOSIS — E78.5 HYPERLIPIDEMIA, UNSPECIFIED HYPERLIPIDEMIA TYPE: ICD-10-CM

## 2022-11-14 DIAGNOSIS — L03.115 CELLULITIS OF RIGHT LOWER EXTREMITY: ICD-10-CM

## 2022-11-14 DIAGNOSIS — I73.9 PVD (PERIPHERAL VASCULAR DISEASE): ICD-10-CM

## 2022-11-14 DIAGNOSIS — I50.23 ACUTE ON CHRONIC HFREF (HEART FAILURE WITH REDUCED EJECTION FRACTION): Primary | ICD-10-CM

## 2022-11-14 DIAGNOSIS — J90 PLEURAL EFFUSION: ICD-10-CM

## 2022-11-14 DIAGNOSIS — E03.9 HYPOTHYROIDISM (ACQUIRED): Chronic | ICD-10-CM

## 2022-11-14 DIAGNOSIS — J90 PLEURAL EFFUSION, BILATERAL: ICD-10-CM

## 2022-11-14 DIAGNOSIS — I70.213 ATHEROSCLEROSIS OF NATIVE ARTERY OF BOTH LOWER EXTREMITIES WITH INTERMITTENT CLAUDICATION: ICD-10-CM

## 2022-11-14 LAB
ALBUMIN SERPL-MCNC: 3.8 G/DL (ref 3.5–5.2)
ALBUMIN/GLOB SERPL: 1.5 G/DL
ALP SERPL-CCNC: 168 U/L (ref 39–117)
ALT SERPL W P-5'-P-CCNC: 55 U/L (ref 1–41)
ANION GAP SERPL CALCULATED.3IONS-SCNC: 10 MMOL/L (ref 5–15)
ANION GAP SERPL CALCULATED.3IONS-SCNC: 11.6 MMOL/L (ref 5–15)
AST SERPL-CCNC: 23 U/L (ref 1–40)
BASOPHILS # BLD AUTO: 0.04 10*3/MM3 (ref 0–0.2)
BASOPHILS NFR BLD AUTO: 0.6 % (ref 0–1.5)
BILIRUB SERPL-MCNC: 0.3 MG/DL (ref 0–1.2)
BUN SERPL-MCNC: 43 MG/DL (ref 8–23)
BUN SERPL-MCNC: 43 MG/DL (ref 8–23)
BUN/CREAT SERPL: 22.1 (ref 7–25)
BUN/CREAT SERPL: 26.9 (ref 7–25)
CALCIUM SPEC-SCNC: 9.3 MG/DL (ref 8.6–10.5)
CALCIUM SPEC-SCNC: 9.4 MG/DL (ref 8.6–10.5)
CHLORIDE SERPL-SCNC: 101 MMOL/L (ref 98–107)
CHLORIDE SERPL-SCNC: 103 MMOL/L (ref 98–107)
CO2 SERPL-SCNC: 28.4 MMOL/L (ref 22–29)
CO2 SERPL-SCNC: 29 MMOL/L (ref 22–29)
CREAT SERPL-MCNC: 1.6 MG/DL (ref 0.76–1.27)
CREAT SERPL-MCNC: 1.95 MG/DL (ref 0.76–1.27)
DEPRECATED RDW RBC AUTO: 48.9 FL (ref 37–54)
EGFRCR SERPLBLD CKD-EPI 2021: 34.8 ML/MIN/1.73
EGFRCR SERPLBLD CKD-EPI 2021: 44.1 ML/MIN/1.73
EOSINOPHIL # BLD AUTO: 0.12 10*3/MM3 (ref 0–0.4)
EOSINOPHIL NFR BLD AUTO: 1.9 % (ref 0.3–6.2)
ERYTHROCYTE [DISTWIDTH] IN BLOOD BY AUTOMATED COUNT: 14.6 % (ref 12.3–15.4)
GLOBULIN UR ELPH-MCNC: 2.6 GM/DL
GLUCOSE SERPL-MCNC: 177 MG/DL (ref 65–99)
GLUCOSE SERPL-MCNC: 201 MG/DL (ref 65–99)
HCT VFR BLD AUTO: 34.7 % (ref 37.5–51)
HGB BLD-MCNC: 10.9 G/DL (ref 13–17.7)
HOLD SPECIMEN: NORMAL
HOLD SPECIMEN: NORMAL
IMM GRANULOCYTES # BLD AUTO: 0.01 10*3/MM3 (ref 0–0.05)
IMM GRANULOCYTES NFR BLD AUTO: 0.2 % (ref 0–0.5)
LIPASE SERPL-CCNC: 10 U/L (ref 13–60)
LYMPHOCYTES # BLD AUTO: 1.12 10*3/MM3 (ref 0.7–3.1)
LYMPHOCYTES NFR BLD AUTO: 17.4 % (ref 19.6–45.3)
MCH RBC QN AUTO: 28.8 PG (ref 26.6–33)
MCHC RBC AUTO-ENTMCNC: 31.4 G/DL (ref 31.5–35.7)
MCV RBC AUTO: 91.6 FL (ref 79–97)
MONOCYTES # BLD AUTO: 0.75 10*3/MM3 (ref 0.1–0.9)
MONOCYTES NFR BLD AUTO: 11.7 % (ref 5–12)
NEUTROPHILS NFR BLD AUTO: 4.38 10*3/MM3 (ref 1.7–7)
NEUTROPHILS NFR BLD AUTO: 68.2 % (ref 42.7–76)
NRBC BLD AUTO-RTO: 0 /100 WBC (ref 0–0.2)
NT-PROBNP SERPL-MCNC: ABNORMAL PG/ML (ref 0–1800)
PLATELET # BLD AUTO: 166 10*3/MM3 (ref 140–450)
PMV BLD AUTO: 13 FL (ref 6–12)
POTASSIUM SERPL-SCNC: 4.7 MMOL/L (ref 3.5–5.2)
POTASSIUM SERPL-SCNC: 5.1 MMOL/L (ref 3.5–5.2)
PROT SERPL-MCNC: 6.4 G/DL (ref 6–8.5)
RBC # BLD AUTO: 3.79 10*6/MM3 (ref 4.14–5.8)
SODIUM SERPL-SCNC: 141 MMOL/L (ref 136–145)
SODIUM SERPL-SCNC: 142 MMOL/L (ref 136–145)
TROPONIN T SERPL-MCNC: 0.07 NG/ML (ref 0–0.03)
WBC NRBC COR # BLD: 6.42 10*3/MM3 (ref 3.4–10.8)
WHOLE BLOOD HOLD COAG: NORMAL
WHOLE BLOOD HOLD SPECIMEN: NORMAL

## 2022-11-14 PROCEDURE — 36415 COLL VENOUS BLD VENIPUNCTURE: CPT

## 2022-11-14 PROCEDURE — 93005 ELECTROCARDIOGRAM TRACING: CPT

## 2022-11-14 PROCEDURE — 93005 ELECTROCARDIOGRAM TRACING: CPT | Performed by: EMERGENCY MEDICINE

## 2022-11-14 PROCEDURE — 83880 ASSAY OF NATRIURETIC PEPTIDE: CPT | Performed by: EMERGENCY MEDICINE

## 2022-11-14 PROCEDURE — 71250 CT THORAX DX C-: CPT

## 2022-11-14 PROCEDURE — 71045 X-RAY EXAM CHEST 1 VIEW: CPT

## 2022-11-14 PROCEDURE — 85652 RBC SED RATE AUTOMATED: CPT | Performed by: NURSE PRACTITIONER

## 2022-11-14 PROCEDURE — 80053 COMPREHEN METABOLIC PANEL: CPT | Performed by: EMERGENCY MEDICINE

## 2022-11-14 PROCEDURE — 85025 COMPLETE CBC W/AUTO DIFF WBC: CPT | Performed by: EMERGENCY MEDICINE

## 2022-11-14 PROCEDURE — 84484 ASSAY OF TROPONIN QUANT: CPT | Performed by: EMERGENCY MEDICINE

## 2022-11-14 PROCEDURE — 99285 EMERGENCY DEPT VISIT HI MDM: CPT

## 2022-11-14 PROCEDURE — 83605 ASSAY OF LACTIC ACID: CPT | Performed by: PHYSICIAN ASSISTANT

## 2022-11-14 PROCEDURE — 84153 ASSAY OF PSA TOTAL: CPT

## 2022-11-14 PROCEDURE — 83880 ASSAY OF NATRIURETIC PEPTIDE: CPT

## 2022-11-14 PROCEDURE — 83690 ASSAY OF LIPASE: CPT | Performed by: EMERGENCY MEDICINE

## 2022-11-14 PROCEDURE — 99223 1ST HOSP IP/OBS HIGH 75: CPT | Performed by: INTERNAL MEDICINE

## 2022-11-14 RX ORDER — ASPIRIN 81 MG/1
324 TABLET, CHEWABLE ORAL ONCE
Status: DISCONTINUED | OUTPATIENT
Start: 2022-11-14 | End: 2022-11-20 | Stop reason: HOSPADM

## 2022-11-14 RX ORDER — SODIUM CHLORIDE 0.9 % (FLUSH) 0.9 %
10 SYRINGE (ML) INJECTION AS NEEDED
Status: DISCONTINUED | OUTPATIENT
Start: 2022-11-14 | End: 2022-11-20 | Stop reason: HOSPADM

## 2022-11-14 RX ORDER — BUMETANIDE 0.25 MG/ML
2 INJECTION INTRAMUSCULAR; INTRAVENOUS ONCE
Status: COMPLETED | OUTPATIENT
Start: 2022-11-14 | End: 2022-11-14

## 2022-11-14 RX ADMIN — BUMETANIDE 2 MG: 0.25 INJECTION INTRAMUSCULAR; INTRAVENOUS at 23:46

## 2022-11-15 PROBLEM — N18.2 CKD (CHRONIC KIDNEY DISEASE) STAGE 2, GFR 60-89 ML/MIN: Status: ACTIVE | Noted: 2022-11-15

## 2022-11-15 PROBLEM — L97.509 DIABETIC FOOT ULCER: Status: ACTIVE | Noted: 2022-11-15

## 2022-11-15 PROBLEM — Z89.412 STATUS POST AMPUTATION OF GREAT TOE, LEFT: Status: ACTIVE | Noted: 2022-11-15

## 2022-11-15 PROBLEM — S92.919A PHALANX FRACTURE, FOOT: Status: RESOLVED | Noted: 2022-07-28 | Resolved: 2022-11-15

## 2022-11-15 PROBLEM — K21.9 GERD WITHOUT ESOPHAGITIS: Status: ACTIVE | Noted: 2022-11-15

## 2022-11-15 PROBLEM — N17.9 AKI (ACUTE KIDNEY INJURY): Status: RESOLVED | Noted: 2022-08-15 | Resolved: 2022-11-15

## 2022-11-15 PROBLEM — R53.1 GENERALIZED WEAKNESS: Status: ACTIVE | Noted: 2022-11-15

## 2022-11-15 PROBLEM — E87.20 LACTIC ACIDOSIS: Status: RESOLVED | Noted: 2022-07-27 | Resolved: 2022-11-15

## 2022-11-15 PROBLEM — J90 PLEURAL EFFUSION, BILATERAL: Status: ACTIVE | Noted: 2022-11-15

## 2022-11-15 PROBLEM — R79.89 ELEVATED SERUM CREATININE: Status: ACTIVE | Noted: 2022-11-15

## 2022-11-15 PROBLEM — R42 DIZZINESS: Status: RESOLVED | Noted: 2020-07-09 | Resolved: 2022-11-15

## 2022-11-15 PROBLEM — D63.8 ANEMIA, CHRONIC DISEASE: Status: ACTIVE | Noted: 2022-11-15

## 2022-11-15 PROBLEM — I73.9 PVD (PERIPHERAL VASCULAR DISEASE) (HCC): Status: ACTIVE | Noted: 2022-11-15

## 2022-11-15 PROBLEM — N18.30 CKD (CHRONIC KIDNEY DISEASE) STAGE 3, GFR 30-59 ML/MIN: Status: ACTIVE | Noted: 2022-11-15

## 2022-11-15 PROBLEM — E11.621 DIABETIC FOOT ULCER (HCC): Status: ACTIVE | Noted: 2022-11-15

## 2022-11-15 LAB
ANION GAP SERPL CALCULATED.3IONS-SCNC: 12 MMOL/L (ref 5–15)
B PARAPERT DNA SPEC QL NAA+PROBE: NOT DETECTED
B PERT DNA SPEC QL NAA+PROBE: NOT DETECTED
BACTERIA UR QL AUTO: ABNORMAL /HPF
BASOPHILS # BLD AUTO: 0.03 10*3/MM3 (ref 0–0.2)
BASOPHILS NFR BLD AUTO: 0.5 % (ref 0–1.5)
BILIRUB UR QL STRIP: NEGATIVE
BUN SERPL-MCNC: 43 MG/DL (ref 8–23)
BUN/CREAT SERPL: 25.3 (ref 7–25)
C PNEUM DNA NPH QL NAA+NON-PROBE: NOT DETECTED
CALCIUM SPEC-SCNC: 9 MG/DL (ref 8.6–10.5)
CHLORIDE SERPL-SCNC: 104 MMOL/L (ref 98–107)
CLARITY UR: ABNORMAL
CO2 SERPL-SCNC: 25 MMOL/L (ref 22–29)
COLOR UR: YELLOW
CREAT SERPL-MCNC: 1.7 MG/DL (ref 0.76–1.27)
CRP SERPL-MCNC: 0.67 MG/DL (ref 0–0.5)
D-LACTATE SERPL-SCNC: 1.3 MMOL/L (ref 0.5–2)
DEPRECATED RDW RBC AUTO: 49.1 FL (ref 37–54)
EGFRCR SERPLBLD CKD-EPI 2021: 41 ML/MIN/1.73
EOSINOPHIL # BLD AUTO: 0.13 10*3/MM3 (ref 0–0.4)
EOSINOPHIL NFR BLD AUTO: 2.2 % (ref 0.3–6.2)
ERYTHROCYTE [DISTWIDTH] IN BLOOD BY AUTOMATED COUNT: 14.5 % (ref 12.3–15.4)
ERYTHROCYTE [SEDIMENTATION RATE] IN BLOOD: 8 MM/HR (ref 0–20)
FLUAV SUBTYP SPEC NAA+PROBE: NOT DETECTED
FLUBV RNA ISLT QL NAA+PROBE: NOT DETECTED
GLUCOSE BLDC GLUCOMTR-MCNC: 149 MG/DL (ref 70–130)
GLUCOSE BLDC GLUCOMTR-MCNC: 151 MG/DL (ref 70–130)
GLUCOSE BLDC GLUCOMTR-MCNC: 163 MG/DL (ref 70–130)
GLUCOSE SERPL-MCNC: 157 MG/DL (ref 65–99)
GLUCOSE UR STRIP-MCNC: NEGATIVE MG/DL
HADV DNA SPEC NAA+PROBE: NOT DETECTED
HBA1C MFR BLD: 7.3 % (ref 4.8–5.6)
HCOV 229E RNA SPEC QL NAA+PROBE: NOT DETECTED
HCOV HKU1 RNA SPEC QL NAA+PROBE: NOT DETECTED
HCOV NL63 RNA SPEC QL NAA+PROBE: NOT DETECTED
HCOV OC43 RNA SPEC QL NAA+PROBE: NOT DETECTED
HCT VFR BLD AUTO: 33.3 % (ref 37.5–51)
HGB BLD-MCNC: 10.5 G/DL (ref 13–17.7)
HGB UR QL STRIP.AUTO: ABNORMAL
HMPV RNA NPH QL NAA+NON-PROBE: NOT DETECTED
HOLD SPECIMEN: NORMAL
HPIV1 RNA ISLT QL NAA+PROBE: NOT DETECTED
HPIV2 RNA SPEC QL NAA+PROBE: NOT DETECTED
HPIV3 RNA NPH QL NAA+PROBE: NOT DETECTED
HPIV4 P GENE NPH QL NAA+PROBE: NOT DETECTED
HYALINE CASTS UR QL AUTO: ABNORMAL /LPF
IMM GRANULOCYTES # BLD AUTO: 0.02 10*3/MM3 (ref 0–0.05)
IMM GRANULOCYTES NFR BLD AUTO: 0.3 % (ref 0–0.5)
KETONES UR QL STRIP: NEGATIVE
LEUKOCYTE ESTERASE UR QL STRIP.AUTO: ABNORMAL
LYMPHOCYTES # BLD AUTO: 1.05 10*3/MM3 (ref 0.7–3.1)
LYMPHOCYTES NFR BLD AUTO: 17.7 % (ref 19.6–45.3)
M PNEUMO IGG SER IA-ACNC: NOT DETECTED
MAGNESIUM SERPL-MCNC: 2 MG/DL (ref 1.6–2.4)
MCH RBC QN AUTO: 28.9 PG (ref 26.6–33)
MCHC RBC AUTO-ENTMCNC: 31.5 G/DL (ref 31.5–35.7)
MCV RBC AUTO: 91.7 FL (ref 79–97)
MONOCYTES # BLD AUTO: 0.74 10*3/MM3 (ref 0.1–0.9)
MONOCYTES NFR BLD AUTO: 12.5 % (ref 5–12)
NEUTROPHILS NFR BLD AUTO: 3.95 10*3/MM3 (ref 1.7–7)
NEUTROPHILS NFR BLD AUTO: 66.8 % (ref 42.7–76)
NITRITE UR QL STRIP: NEGATIVE
NRBC BLD AUTO-RTO: 0 /100 WBC (ref 0–0.2)
NT-PROBNP SERPL-MCNC: ABNORMAL PG/ML (ref 0–1800)
PH UR STRIP.AUTO: <=5 [PH] (ref 5–8)
PLATELET # BLD AUTO: 154 10*3/MM3 (ref 140–450)
PMV BLD AUTO: 12.7 FL (ref 6–12)
POTASSIUM SERPL-SCNC: 4.4 MMOL/L (ref 3.5–5.2)
PROCALCITONIN SERPL-MCNC: 0.06 NG/ML (ref 0–0.25)
PROT UR QL STRIP: NEGATIVE
PSA SERPL-MCNC: 14.4 NG/ML (ref 0–4)
QT INTERVAL: 492 MS
QT INTERVAL: 508 MS
QT INTERVAL: 512 MS
QTC INTERVAL: 523 MS
QTC INTERVAL: 524 MS
QTC INTERVAL: 536 MS
RBC # BLD AUTO: 3.63 10*6/MM3 (ref 4.14–5.8)
RBC # UR STRIP: ABNORMAL /HPF
REF LAB TEST METHOD: ABNORMAL
RHINOVIRUS RNA SPEC NAA+PROBE: NOT DETECTED
RSV RNA NPH QL NAA+NON-PROBE: NOT DETECTED
SARS-COV-2 RNA NPH QL NAA+NON-PROBE: NOT DETECTED
SODIUM SERPL-SCNC: 141 MMOL/L (ref 136–145)
SP GR UR STRIP: 1.01 (ref 1–1.03)
SQUAMOUS #/AREA URNS HPF: ABNORMAL /HPF
TROPONIN T SERPL-MCNC: 0.07 NG/ML (ref 0–0.03)
TROPONIN T SERPL-MCNC: 0.08 NG/ML (ref 0–0.03)
TSH SERPL DL<=0.05 MIU/L-ACNC: 8.02 UIU/ML (ref 0.27–4.2)
UROBILINOGEN UR QL STRIP: ABNORMAL
WBC # UR STRIP: ABNORMAL /HPF
WBC NRBC COR # BLD: 5.92 10*3/MM3 (ref 3.4–10.8)
YEAST URNS QL MICRO: ABNORMAL /HPF

## 2022-11-15 PROCEDURE — 63710000001 INSULIN LISPRO (HUMAN) PER 5 UNITS: Performed by: NURSE PRACTITIONER

## 2022-11-15 PROCEDURE — 86140 C-REACTIVE PROTEIN: CPT | Performed by: NURSE PRACTITIONER

## 2022-11-15 PROCEDURE — 83036 HEMOGLOBIN GLYCOSYLATED A1C: CPT | Performed by: NURSE PRACTITIONER

## 2022-11-15 PROCEDURE — 84484 ASSAY OF TROPONIN QUANT: CPT | Performed by: EMERGENCY MEDICINE

## 2022-11-15 PROCEDURE — 97165 OT EVAL LOW COMPLEX 30 MIN: CPT

## 2022-11-15 PROCEDURE — 80048 BASIC METABOLIC PNL TOTAL CA: CPT | Performed by: NURSE PRACTITIONER

## 2022-11-15 PROCEDURE — 0202U NFCT DS 22 TRGT SARS-COV-2: CPT | Performed by: NURSE PRACTITIONER

## 2022-11-15 PROCEDURE — 84484 ASSAY OF TROPONIN QUANT: CPT | Performed by: NURSE PRACTITIONER

## 2022-11-15 PROCEDURE — 93005 ELECTROCARDIOGRAM TRACING: CPT | Performed by: NURSE PRACTITIONER

## 2022-11-15 PROCEDURE — 97161 PT EVAL LOW COMPLEX 20 MIN: CPT

## 2022-11-15 PROCEDURE — 93010 ELECTROCARDIOGRAM REPORT: CPT | Performed by: INTERNAL MEDICINE

## 2022-11-15 PROCEDURE — 81001 URINALYSIS AUTO W/SCOPE: CPT | Performed by: NURSE PRACTITIONER

## 2022-11-15 PROCEDURE — 84145 PROCALCITONIN (PCT): CPT | Performed by: PHYSICIAN ASSISTANT

## 2022-11-15 PROCEDURE — 85025 COMPLETE CBC W/AUTO DIFF WBC: CPT | Performed by: NURSE PRACTITIONER

## 2022-11-15 PROCEDURE — 82962 GLUCOSE BLOOD TEST: CPT

## 2022-11-15 PROCEDURE — 92610 EVALUATE SWALLOWING FUNCTION: CPT

## 2022-11-15 PROCEDURE — 83735 ASSAY OF MAGNESIUM: CPT | Performed by: NURSE PRACTITIONER

## 2022-11-15 PROCEDURE — 84443 ASSAY THYROID STIM HORMONE: CPT | Performed by: NURSE PRACTITIONER

## 2022-11-15 PROCEDURE — 87086 URINE CULTURE/COLONY COUNT: CPT | Performed by: NURSE PRACTITIONER

## 2022-11-15 RX ORDER — INSULIN LISPRO 100 [IU]/ML
3 INJECTION, SOLUTION INTRAVENOUS; SUBCUTANEOUS
COMMUNITY

## 2022-11-15 RX ORDER — L.ACID,PARA/B.BIFIDUM/S.THERM 8B CELL
1 CAPSULE ORAL DAILY
Status: DISCONTINUED | OUTPATIENT
Start: 2022-11-15 | End: 2022-11-20 | Stop reason: HOSPADM

## 2022-11-15 RX ORDER — DEXTROSE MONOHYDRATE 25 G/50ML
25 INJECTION, SOLUTION INTRAVENOUS
Status: DISCONTINUED | OUTPATIENT
Start: 2022-11-15 | End: 2022-11-20 | Stop reason: HOSPADM

## 2022-11-15 RX ORDER — LEVOTHYROXINE SODIUM 0.07 MG/1
75 TABLET ORAL
Status: DISCONTINUED | OUTPATIENT
Start: 2022-11-15 | End: 2022-11-15

## 2022-11-15 RX ORDER — FAMOTIDINE 20 MG/1
20 TABLET, FILM COATED ORAL DAILY
Status: DISCONTINUED | OUTPATIENT
Start: 2022-11-15 | End: 2022-11-20 | Stop reason: HOSPADM

## 2022-11-15 RX ORDER — BUMETANIDE 1 MG/1
1 TABLET ORAL 2 TIMES DAILY
Status: DISCONTINUED | OUTPATIENT
Start: 2022-11-16 | End: 2022-11-20 | Stop reason: HOSPADM

## 2022-11-15 RX ORDER — SODIUM CHLORIDE 0.9 % (FLUSH) 0.9 %
10 SYRINGE (ML) INJECTION EVERY 12 HOURS SCHEDULED
Status: DISCONTINUED | OUTPATIENT
Start: 2022-11-15 | End: 2022-11-20 | Stop reason: HOSPADM

## 2022-11-15 RX ORDER — NICOTINE POLACRILEX 4 MG
15 LOZENGE BUCCAL
Status: DISCONTINUED | OUTPATIENT
Start: 2022-11-15 | End: 2022-11-20 | Stop reason: HOSPADM

## 2022-11-15 RX ORDER — HYDROXYZINE HYDROCHLORIDE 10 MG/1
10 TABLET, FILM COATED ORAL 3 TIMES DAILY PRN
Status: DISCONTINUED | OUTPATIENT
Start: 2022-11-15 | End: 2022-11-20 | Stop reason: HOSPADM

## 2022-11-15 RX ORDER — ACETAMINOPHEN 325 MG/1
650 TABLET ORAL EVERY 6 HOURS PRN
Status: DISCONTINUED | OUTPATIENT
Start: 2022-11-15 | End: 2022-11-20 | Stop reason: HOSPADM

## 2022-11-15 RX ORDER — DOCUSATE SODIUM 100 MG/1
100 CAPSULE, LIQUID FILLED ORAL 2 TIMES DAILY
Status: DISCONTINUED | OUTPATIENT
Start: 2022-11-15 | End: 2022-11-20 | Stop reason: HOSPADM

## 2022-11-15 RX ORDER — ATORVASTATIN CALCIUM 20 MG/1
20 TABLET, FILM COATED ORAL NIGHTLY
Status: DISCONTINUED | OUTPATIENT
Start: 2022-11-15 | End: 2022-11-20 | Stop reason: HOSPADM

## 2022-11-15 RX ORDER — BUMETANIDE 0.25 MG/ML
2 INJECTION INTRAMUSCULAR; INTRAVENOUS ONCE
Status: DISCONTINUED | OUTPATIENT
Start: 2022-11-15 | End: 2022-11-15 | Stop reason: SDUPTHER

## 2022-11-15 RX ORDER — INSULIN LISPRO 100 [IU]/ML
0-7 INJECTION, SOLUTION INTRAVENOUS; SUBCUTANEOUS
Status: DISCONTINUED | OUTPATIENT
Start: 2022-11-15 | End: 2022-11-20 | Stop reason: HOSPADM

## 2022-11-15 RX ORDER — BUMETANIDE 0.25 MG/ML
2 INJECTION INTRAMUSCULAR; INTRAVENOUS ONCE
Status: COMPLETED | OUTPATIENT
Start: 2022-11-15 | End: 2022-11-15

## 2022-11-15 RX ORDER — SODIUM CHLORIDE 0.9 % (FLUSH) 0.9 %
10 SYRINGE (ML) INJECTION AS NEEDED
Status: DISCONTINUED | OUTPATIENT
Start: 2022-11-15 | End: 2022-11-20 | Stop reason: HOSPADM

## 2022-11-15 RX ORDER — ASPIRIN 81 MG/1
81 TABLET, CHEWABLE ORAL DAILY
Status: DISCONTINUED | OUTPATIENT
Start: 2022-11-15 | End: 2022-11-20 | Stop reason: HOSPADM

## 2022-11-15 RX ORDER — HYDRALAZINE HYDROCHLORIDE 10 MG/1
10 TABLET, FILM COATED ORAL EVERY 8 HOURS
Status: DISCONTINUED | OUTPATIENT
Start: 2022-11-15 | End: 2022-11-15

## 2022-11-15 RX ORDER — AMOXICILLIN 250 MG
1 CAPSULE ORAL DAILY
Status: DISCONTINUED | OUTPATIENT
Start: 2022-11-15 | End: 2022-11-20 | Stop reason: HOSPADM

## 2022-11-15 RX ORDER — DOXYCYCLINE 100 MG/1
100 CAPSULE ORAL EVERY 12 HOURS SCHEDULED
Status: DISCONTINUED | OUTPATIENT
Start: 2022-11-15 | End: 2022-11-20 | Stop reason: HOSPADM

## 2022-11-15 RX ORDER — LEVOTHYROXINE SODIUM 88 UG/1
88 TABLET ORAL
Status: DISCONTINUED | OUTPATIENT
Start: 2022-11-16 | End: 2022-11-20 | Stop reason: HOSPADM

## 2022-11-15 RX ORDER — DOXYCYCLINE HYCLATE 100 MG
100 TABLET ORAL 2 TIMES DAILY
COMMUNITY
End: 2023-03-22

## 2022-11-15 RX ORDER — TAMSULOSIN HYDROCHLORIDE 0.4 MG/1
0.4 CAPSULE ORAL DAILY
Status: DISCONTINUED | OUTPATIENT
Start: 2022-11-15 | End: 2022-11-20 | Stop reason: HOSPADM

## 2022-11-15 RX ORDER — CARVEDILOL 3.12 MG/1
3.12 TABLET ORAL 2 TIMES DAILY WITH MEALS
Status: DISCONTINUED | OUTPATIENT
Start: 2022-11-15 | End: 2022-11-15

## 2022-11-15 RX ORDER — CARVEDILOL 3.12 MG/1
3.12 TABLET ORAL 2 TIMES DAILY WITH MEALS
Status: DISCONTINUED | OUTPATIENT
Start: 2022-11-15 | End: 2022-11-20 | Stop reason: HOSPADM

## 2022-11-15 RX ADMIN — FAMOTIDINE 20 MG: 20 TABLET ORAL at 09:25

## 2022-11-15 RX ADMIN — APIXABAN 5 MG: 5 TABLET, FILM COATED ORAL at 20:02

## 2022-11-15 RX ADMIN — ASPIRIN 81 MG CHEWABLE TABLET 81 MG: 81 TABLET CHEWABLE at 09:25

## 2022-11-15 RX ADMIN — INSULIN LISPRO 2 UNITS: 100 INJECTION, SOLUTION INTRAVENOUS; SUBCUTANEOUS at 12:10

## 2022-11-15 RX ADMIN — LEVOTHYROXINE SODIUM 75 MCG: 0.07 TABLET ORAL at 05:10

## 2022-11-15 RX ADMIN — INSULIN LISPRO 2 UNITS: 100 INJECTION, SOLUTION INTRAVENOUS; SUBCUTANEOUS at 17:44

## 2022-11-15 RX ADMIN — BUMETANIDE 2 MG: 0.25 INJECTION INTRAMUSCULAR; INTRAVENOUS at 17:45

## 2022-11-15 RX ADMIN — ATORVASTATIN CALCIUM 20 MG: 20 TABLET, FILM COATED ORAL at 20:02

## 2022-11-15 RX ADMIN — CARVEDILOL 3.12 MG: 3.12 TABLET, FILM COATED ORAL at 17:44

## 2022-11-15 RX ADMIN — DOXYCYCLINE 100 MG: 100 CAPSULE ORAL at 05:10

## 2022-11-15 RX ADMIN — HYDROXYZINE HYDROCHLORIDE 10 MG: 10 TABLET ORAL at 16:42

## 2022-11-15 RX ADMIN — DOXYCYCLINE 100 MG: 100 CAPSULE ORAL at 17:44

## 2022-11-15 RX ADMIN — SACUBITRIL AND VALSARTAN 1 TABLET: 24; 26 TABLET, FILM COATED ORAL at 20:02

## 2022-11-15 RX ADMIN — CARVEDILOL 3.12 MG: 3.12 TABLET, FILM COATED ORAL at 12:10

## 2022-11-15 RX ADMIN — SACUBITRIL AND VALSARTAN 1 TABLET: 24; 26 TABLET, FILM COATED ORAL at 09:25

## 2022-11-15 RX ADMIN — TAMSULOSIN HYDROCHLORIDE 0.4 MG: 0.4 CAPSULE ORAL at 09:25

## 2022-11-15 RX ADMIN — APIXABAN 5 MG: 5 TABLET, FILM COATED ORAL at 09:26

## 2022-11-15 RX ADMIN — Medication 1 CAPSULE: at 09:25

## 2022-11-15 RX ADMIN — DOCUSATE SODIUM 100 MG: 100 CAPSULE, LIQUID FILLED ORAL at 20:02

## 2022-11-15 RX ADMIN — Medication 10 ML: at 20:03

## 2022-11-15 RX ADMIN — Medication 10 ML: at 09:26

## 2022-11-15 RX ADMIN — BUMETANIDE 2 MG: 0.25 INJECTION INTRAMUSCULAR; INTRAVENOUS at 09:26

## 2022-11-15 NOTE — PLAN OF CARE
Goal Outcome Evaluation:  Plan of Care Reviewed With: patient        Progress: no change  Outcome Evaluation: OT evaluation completed as pt. willing.  Pt. demonstrated impaired ROM, strength, balance, sensation and endurance/activity tolerance impacting BADL independence.  Pt. reports using a lift chair and walker at home in addition to bathroom AD.  Per pt. wife does his LBB and LBD, but does have AE, although does not like to use. Pt. SBA OOB, CGA with transfers and dependent LBD, min A UBD.  Pt. appropriate for skilled OT services to address defcit aeras and promote return to higher BADL independence. IRF would be beneficial, but if pt. declines recommend home with 24/7 assist and HH therapy vs OP therapy pending progress.

## 2022-11-15 NOTE — THERAPY EVALUATION
Patient Name: Jermaine Singh  : 1945    MRN: [id number]                              Today's Date: 11/15/2022       Admit Date: 2022    Visit Dx:     ICD-10-CM ICD-9-CM   1. Acute on chronic HFrEF (heart failure with reduced ejection fraction) ([facility])  I50.23 428.23   2. Unstable angina ([facility])  I20.0 411.1   3. Shortness of breath  R06.02 786.05   4. Pleural effusion  J90 511.9   5. 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  Preserved LV function   • Type 2 diabetes mellitus ([facility])   • Hypertension   • Hyperlipidemia   • Hypothyroidism (acquired)   • Vitamin D deficiency on Rx    • Diabetic neuropathy   • RBBB   • S/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 Au   • Atherosclerosis of native artery of both lower extremities with intermittent claudication ([facility])   • SSS (sick sinus syndrome) ([facility])   • Subacute osteomyelitis of left foot ([facility])   • Acute on chronic HFrEF (heart failure with reduced ejection fraction) ([facility])   • DVT, bilateral lower limbs ([facility])   • Elevated troponin   • Cellulitis of right lower extremity   • Obesity (BMI 30.0-34.9)   • Elevated serum creatinine   • Anemia, chronic disease   • Pleural effusion, bilateral   • PVD (peripheral vascular disease) ([facility])   • Status post amputation of great toe, left ([facility])   • Diabetic foot ulcer ([facility])   • Generalized weakness   • GERD without esophagitis   • CKD (chronic kidney disease) stage 3, GFR 30-59 ml/min ([facility])     Past Medical History:   Diagnosis Date   • Allergic    • Arthritis    • Asthma    • Coronary artery disease    • Diabetes mellitus ([facility])     started on [place] 2018; started on po meds in ; checking blood sugars daily    • Disease of thyroid gland     po meds daily for hypothyroidism    • History of fracture as a child     rt leg- severe    • Hyperlipidemia    • Hypertension    • Hypothyroidism    • Peripheral  neuropathy    • Peripheral vascular disease ([facility])     s/p angiogram 2/19-needs stent in left leg    • Proximal phalanx fracture of the second digit extending into the second metatarsal joint 7/28/2022   • RBBB    • Right knee pain    • Vitamin D deficiency      Past Surgical History:   Procedure Laterality Date   • APPENDECTOMY     • CARDIAC CATHETERIZATION N/A 2/14/2019    Procedure: Left Heart Cath;  Surgeon: Cooper Apodaca MD;  Location:  [facility] CATH INVASIVE LOCATION;  Service: Cardiology   • CARDIAC ELECTROPHYSIOLOGY PROCEDURE N/A 5/18/2022    Procedure: DEVICE IMPLANT;  Surgeon: Cooper Apodaca MD;  Location: [email or web address] CATH INVASIVE LOCATION;  Service: Cardiology;  Laterality: N/A;   • COLONOSCOPY     • CORONARY ARTERY BYPASS GRAFT N/A 3/22/2019    Procedure: MEDIAN STERNOTOMY, CORONARY ARTERY BYPASS GRAFT X3, UTILIZING THE LEFT INTERNAL MAMMARY ARTERY, EVH AND OPEN HARVEST OF THE RIGHT GREATER SAPHENOUS VEIN, EXPLORATION OF THE LEFT LEG;  Surgeon: Ap Au MD;  Location:  [facility];  Service: Cardiothoracic   • EYE SURGERY Bilateral     cataracts    • INTERVENTIONAL RADIOLOGY PROCEDURE N/A 7/29/2021    Procedure: Abdominal Aortagram with Runoff;  Surgeon: Jermaine Hernandez MD;  Location:  [facility] CATH INVASIVE LOCATION;  Service: Cardiovascular;  Laterality: N/A;   • KNEE ARTHROSCOPY      right x 2, left x 1   • LACERATION REPAIR      right leg   • LEG SURGERY      2 for fracture of rt leg    • TONSILLECTOMY      Adnoidectomy   • TRANS METATARSAL AMPUTATION Left 8/2/2022    Procedure: GREAT TOE AMPUTATION LEFT;  Surgeon: Gopal Márquez MD;  Location:  [facility];  Service: Vascular;  Laterality: Left;      General Information     Row Name 11/15/22 1154          Physical Therapy Time and Intention    Document Type evaluation  -NS     Mode of Treatment physical therapy  -NS     Row Name 11/15/22 1154          General Information    Patient Profile Reviewed yes  -NS     Prior Level of Function  independent:;all household mobility;gait;transfer;bed mobility;mod assist:;ADL's  Pt reports that he uses RW at baseline  -NS     Existing Precautions/Restrictions hip;other (see comments)  Pt reports that he is full weight bearing on LLE withoffloading shoe s/p L toe amputation 8/2022  -NS     Barriers to Rehab previous functional deficit  -NS     Row Name 11/15/22 1154          Living Environment    People in Home spouse  -NS     Row Name 11/15/22 1154          Home Main Entrance    Number of Stairs, Main Entrance four  -NS     Stair Railings, Main Entrance railing on right side (ascending)  -NS     Row Name 11/15/22 1154          Stairs Within Home, Primary    Stairs Comment, Within Home, Primary Pt stays on ground level  -NS     Row Name 11/15/22 1154          Cognition    Orientation Status (Cognition) oriented x 3  -NS     Row Name 11/15/22 1154          Safety Issues, Functional Mobility    Safety Issues Affecting Function (Mobility) insight into deficits/self-awareness;safety precaution awareness;safety precautions follow-through/compliance;sequencing abilities  -NS     Impairments Affecting Function (Mobility) balance;endurance/activity tolerance;coordination;strength;postural/trunk control;sensation/sensory awareness  -NS           User Key  (r) = Recorded By, (t) = Taken By, (c) = Cosigned By    Initials Name Provider Type    Suha oMntgomery, PT Physical Therapist               Mobility     Row Name 11/15/22 1155          Sit-Stand Transfer    Sit-Stand [place] (Transfers) contact guard  -NS     Assistive Device (Sit-Stand Transfers) walker, front-wheeled  -NS     Row Name 11/15/22 1155          Gait/Stairs (Locomotion)    [place] Level (Gait) contact guard  -NS     Assistive Device (Gait) walker, front-wheeled  -NS     Distance in Feet (Gait) 15  -NS     Deviations/Abnormal Patterns (Gait) base of support, narrow;ashli decreased;gait speed decreased;stride length decreased  -NS     Bilateral  Gait Deviations forward flexed posture;heel strike decreased  -NS     Comment, (Gait/Stairs) Offloading shoe donned for mobility- pt required full assist for donning shoe. He ambulated 15ft with flexed posture, requiring cues for safety placement of RW. Distance limited by weakness and fatigue.  -NS           User Key  (r) = Recorded By, (t) = Taken By, (c) = Cosigned By    Initials Name Provider Type    Suha Montgomery PT Physical Therapist               Obj/Interventions     Row Name 11/15/22 1156          Range of Motion Comprehensive    General Range of Motion bilateral lower extremity ROM WFL  -NS     Row Name 11/15/22 1156          Strength Comprehensive (MMT)    General Manual Muscle Testing (MMT) Assessment lower extremity strength deficits identified  -NS     Comment, General Manual Muscle Testing (MMT) Assessment BLEs: grossly 4-/5  -NS     Row Name 11/15/22 1156          Balance    Balance Assessment sitting static balance;sitting dynamic balance;standing static balance;standing dynamic balance  -NS     Static Sitting Balance independent  -NS     Dynamic Sitting Balance standby assist  -NS     Position, Sitting Balance unsupported;sitting in chair  -NS     Static Standing Balance contact guard  -NS     Dynamic Standing Balance contact guard  -NS     Position/Device Used, Standing Balance supported;walker, front-wheeled  -NS     Row Name 11/15/22 1156          Sensory Assessment (Somatosensory)    Sensory Assessment (Somatosensory) left LE  -NS     Left LE Sensory Assessment general sensation;impaired  -NS           User Key  (r) = Recorded By, (t) = Taken By, (c) = Cosigned By    Initials Name Provider Type    Suha Montgomery PT Physical Therapist               Goals/Plan     Row Name 11/15/22 1202          Bed Mobility Goal 1 (PT)    Activity/Assistive Device (Bed Mobility Goal 1, PT) sit to supine/supine to sit  -NS     [place] Level/Cues Needed (Bed Mobility Goal 1, PT) independent  -NS      Time Frame (Bed Mobility Goal 1, PT) long term goal (LTG);2 weeks  -NS     Row Name 11/15/22 1202          Transfer Goal 1 (PT)    Activity/Assistive Device (Transfer Goal 1, PT) sit-to-stand/stand-to-sit;bed-to-chair/chair-to-bed  -NS     [place] Level/Cues Needed (Transfer Goal 1, PT) modified independence  -NS     Time Frame (Transfer Goal 1, PT) long term goal (LTG);2 weeks  -NS     [facility] Name 11/15/22 1202          Gait Training Goal 1 (PT)    Activity/Assistive Device (Gait Training Goal 1, PT) gait (walking locomotion);assistive device use;increase endurance/gait distance  -NS     [place] Level (Gait Training Goal 1, PT) supervision required  -NS     Distance (Gait Training Goal 1, PT) 150  -NS     Time Frame (Gait Training Goal 1, PT) long term goal (LTG);2 weeks  -NS     Row Name 11/15/22 1202          Stairs Goal 1 (PT)    Activity/Assistive Device (Stairs Goal 1, PT) ascending stairs;descending stairs  -NS     [place] Level/Cues Needed (Stairs Goal 1, PT) contact guard required  -NS     Number of Stairs (Stairs Goal 1, PT) 4  -NS     Time Frame (Stairs Goal 1, PT) long term goal (LTG);2 weeks  -NS     [facility] Name 11/15/22 1202          Therapy Assessment/Plan (PT)    Planned Therapy Interventions (PT) balance training;bed mobility training;gait training;home exercise program;neuromuscular re-education;patient/family education;strengthening;transfer training;stair training  -NS           User Key  (r) = Recorded By, (t) = Taken By, (c) = Cosigned By    Initials Name Provider Type    Suha Montgomery, PT Physical Therapist               Clinical Impression     Row Name 11/15/22 1152          Pain    Pretreatment Pain Rating 0/10 - no pain  -NS     Posttreatment Pain Rating 0/10 - no pain  -NS     Row Name 11/15/22 1155          Plan of Care Review    Plan of Care Reviewed With patient  -NS     Progress no change  -NS     Outcome Evaluation Patient presents with weakness and decreased activity  tolerance affecting functional mobility. He required CGA to ambulate 15ft with RW, demonstrating flexed posture with distance limited by fatigue and weakness. Skilled IP PT warranted. Pt would benefit from IRF at discharge to promote independence and return to PLOF. If pt refuses, will need 24/7 assist at home and HHPT.  -NS     Row Name 11/15/22 1157          Therapy Assessment/Plan (PT)    Patient/Family Therapy Goals Statement (PT) get stronger  -NS     Rehab Potential (PT) good, to achieve stated therapy goals  -NS     Criteria for Skilled Interventions Met (PT) yes;meets criteria;skilled treatment is necessary  -NS     Therapy Frequency (PT) daily  -NS     Predicted Duration of Therapy Intervention (PT) 2 weeks  -NS     Row Name 11/15/22 1157          Vital Signs    Pre Systolic BP Rehab 106  -NS     Pre Treatment Diastolic BP 69  -NS     Post Systolic BP Rehab 145  -NS     Post Treatment Diastolic BP 84  -NS     Pretreatment Heart Rate (beats/min) 62  -NS     Posttreatment Heart Rate (beats/min) 66  -NS     Pre SpO2 (%) 95  -NS     O2 Delivery Pre Treatment room air  -NS     Post SpO2 (%) 98  -NS     O2 Delivery Post Treatment room air  -NS     Pre Patient Position Sitting  -NS     Intra Patient Position Standing  -NS     Post Patient Position Sitting  -NS     Row Name 11/15/22 1157          Positioning and Restraints    Pre-Treatment Position in bed  -NS     Post Treatment Position chair  -NS     In Chair notified nsg;reclined;call light within reach;encouraged to call for assist;exit alarm on;waffle cushion;legs elevated  -NS           User Key  (r) = Recorded By, (t) = Taken By, (c) = Cosigned By    Initials Name Provider Type    Suha Montgomery, PT Physical Therapist               Outcome Measures     Row Name 11/15/22 1117          How much help from another person do you currently need...    Turning from your back to your side while in flat bed without using bedrails? 4  -NS     Moving from lying on  back to sitting on the side of a flat bed without bedrails? 3  -NS     Moving to and from a bed to a chair (including a wheelchair)? 3  -NS     Standing up from a chair using your arms (e.g., wheelchair, bedside chair)? 3  -NS     Climbing 3-5 steps with a railing? 2  -NS     To walk in hospital room? 3  -NS     AM-PAC 6 Clicks Score (PT) 18  -NS     Highest level of mobility 6 --> Walked 10 steps or more  -NS     Row Name 11/15/22 1155 11/15/22 1115       Functional Assessment    Outcome Measure Options AM-PAC 6 Clicks Daily Activity (OT)  -[place] AM-PAC 6 Clicks Basic Mobility (PT)  -NS          User Key  (r) = Recorded By, (t) = Taken By, (c) = Cosigned By    Initials Name Provider Type    Renae Morales, OT Occupational Therapist    Suha Montgomery, PT Physical Therapist                             Physical Therapy Education     Title: PT OT SLP Therapies (In Progress)     Topic: Physical Therapy (In Progress)     Point: Mobility training (Done)     Learning Progress Summary           Patient Acceptance, E, VU,NR by NS at 11/15/2022 1203                   Point: Home exercise program (Not Started)     Learner Progress:  Not documented in this visit.          Point: Body mechanics (Done)     Learning Progress Summary           Patient Acceptance, E, VU,NR by NS at 11/15/2022 1203                   Point: Precautions (Done)     Learning Progress Summary           Patient Acceptance, E, VU,NR by NS at 11/15/2022 1203                               User Key     Initials Effective Dates Name Provider Type Discipline    NS 06/16/21 -  Suha Noland, [facility] Physical Therapist PT              PT Recommendation and Plan  Planned Therapy Interventions (PT): balance training, bed mobility training, gait training, home exercise program, neuromuscular re-education, patient/family education, strengthening, transfer training, stair training  Plan of Care Reviewed With: patient  Progress: no change  Outcome Evaluation: Patient  presents with weakness and decreased activity tolerance affecting functional mobility. He required CGA to ambulate 15ft with RW, demonstrating flexed posture with distance limited by fatigue and weakness. Skilled IP PT warranted. Pt would benefit from IRF at discharge to promote independence and return to PLOF. If pt refuses, will need 24/7 assist at home and HHPT.     Time Calculation:    PT Charges     Row Name 11/15/22 1115             Time Calculation    Start Time 1115  -NS      PT Received On 11/15/22  -NS      PT Goal Re-Cert Due Date 11/25/22  -NS         Untimed Charges    PT Eval/Re-eval Minutes 46  -NS         Total Minutes    Untimed Charges Total Minutes 46  -NS       Total Minutes 46  -NS            User Key  (r) = Recorded By, (t) = Taken By, (c) = Cosigned By    Initials Name Provider Type    Suha Montgomery, [facility] Physical Therapist              Therapy Charges for Today     Code Description Service Date Service Provider Modifiers Qty    [id number] HC PT EVAL LOW COMPLEXITY 4 11/15/2022 Suha Noland, PT GP 1          PT G-Codes  Outcome Measure Options: AM-PAC 6 Clicks Daily Activity (OT)  AM-PAC 6 Clicks Score (PT): 18  AM-PAC 6 Clicks Score (OT): 18  PT Discharge Summary  Anticipated Discharge Disposition (PT): inpatient rehabilitation facility    Suha Noland PT  11/15/2022

## 2022-11-15 NOTE — NURSING NOTE
Heel protectors refused by patient.  He said he will ot wear them and he never has been able to.

## 2022-11-15 NOTE — PLAN OF CARE
Goal Outcome Evaluation:  Plan of Care Reviewed With: patient        Progress: no change  Outcome Evaluation: Patient presents with weakness and decreased activity tolerance affecting functional mobility. He required CGA to ambulate 15ft with RW, demonstrating flexed posture with distance limited by fatigue and weakness. Skilled IP PT warranted. Pt would benefit from IRF at discharge to promote independence and return to PLOF. If pt refuses, will need 24/7 assist at home and HHPT.

## 2022-11-15 NOTE — THERAPY EVALUATION
Patient Name: Jermaine Singh  : 1945    MRN: [id number]                              Today's Date: 11/15/2022       Admit Date: 2022    Visit Dx:     ICD-10-CM ICD-9-CM   1. Acute on chronic HFrEF (heart failure with reduced ejection fraction) ([facility])  I50.23 428.23   2. Unstable angina ([facility])  I20.0 411.1   3. Shortness of breath  R06.02 786.05   4. Pleural effusion  J90 511.9   5. 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  Preserved LV function   • Type 2 diabetes mellitus ([facility])   • Hypertension   • Hyperlipidemia   • Hypothyroidism (acquired)   • Vitamin D deficiency on Rx    • Diabetic neuropathy   • RBBB   • S/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 Au   • Atherosclerosis of native artery of both lower extremities with intermittent claudication ([facility])   • SSS (sick sinus syndrome) ([facility])   • Subacute osteomyelitis of left foot ([facility])   • Acute on chronic HFrEF (heart failure with reduced ejection fraction) ([facility])   • DVT, bilateral lower limbs ([facility])   • Elevated troponin   • Cellulitis of right lower extremity   • Obesity (BMI 30.0-34.9)   • Elevated serum creatinine   • Anemia, chronic disease   • Pleural effusion, bilateral   • PVD (peripheral vascular disease) ([facility])   • Status post amputation of great toe, left ([facility])   • Diabetic foot ulcer ([facility])   • Generalized weakness   • GERD without esophagitis   • CKD (chronic kidney disease) stage 3, GFR 30-59 ml/min ([facility])     Past Medical History:   Diagnosis Date   • Allergic    • Arthritis    • Asthma    • Coronary artery disease    • Diabetes mellitus ([facility])     started on [place] 2018; started on po meds in ; checking blood sugars daily    • Disease of thyroid gland     po meds daily for hypothyroidism    • History of fracture as a child     rt leg- severe    • Hyperlipidemia    • Hypertension    • Hypothyroidism    • Peripheral  neuropathy    • Peripheral vascular disease (HCC)     s/p angiogram 2/19-needs stent in left leg    • Proximal phalanx fracture of the second digit extending into the second metatarsal joint 7/28/2022   • RBBB    • Right knee pain    • Vitamin D deficiency      Past Surgical History:   Procedure Laterality Date   • APPENDECTOMY     • CARDIAC CATHETERIZATION N/A 2/14/2019    Procedure: Left Heart Cath;  Surgeon: Cooper Apodaca MD;  Location:  [facility] CATH INVASIVE LOCATION;  Service: Cardiology   • CARDIAC ELECTROPHYSIOLOGY PROCEDURE N/A 5/18/2022    Procedure: DEVICE IMPLANT;  Surgeon: Cooper Apodaca MD;  Location: [facility] CATH INVASIVE LOCATION;  Service: Cardiology;  Laterality: N/A;   • COLONOSCOPY     • CORONARY ARTERY BYPASS GRAFT N/A 3/22/2019    Procedure: MEDIAN STERNOTOMY, CORONARY ARTERY BYPASS GRAFT X3, UTILIZING THE LEFT INTERNAL MAMMARY ARTERY, EVH AND OPEN HARVEST OF THE RIGHT GREATER SAPHENOUS VEIN, EXPLORATION OF THE LEFT LEG;  Surgeon: Ap Au MD;  Location:  [facility];  Service: Cardiothoracic   • EYE SURGERY Bilateral     cataracts    • INTERVENTIONAL RADIOLOGY PROCEDURE N/A 7/29/2021    Procedure: Abdominal Aortagram with Runoff;  Surgeon: Jermaine Hernandez MD;  Location:  [facility] CATH INVASIVE LOCATION;  Service: Cardiovascular;  Laterality: N/A;   • KNEE ARTHROSCOPY      right x 2, left x 1   • LACERATION REPAIR      right leg   • LEG SURGERY      2 for fracture of rt leg    • TONSILLECTOMY      Adnoidectomy   • TRANS METATARSAL AMPUTATION Left 8/2/2022    Procedure: GREAT TOE AMPUTATION LEFT;  Surgeon: Gopal Márquez MD;  Location:  [facility];  Service: Vascular;  Laterality: Left;      General Information     Row Name 11/15/22 1141          OT Time and Intention    Document Type evaluation  -ROSELIA     Mode of Treatment occupational therapy  -ROSELIA     Row Name 11/15/22 1141          General Information    Patient Profile Reviewed yes  -ROSELIA     Prior Level of Function  independent:;all household mobility;gait;transfer;using stairs;feeding;grooming;mod assist:;max assist:;dressing;bathing;dependent:;home management;cooking;cleaning;driving;shopping  per pt. he has LB AE, but does not like to use, wife performs for him  -ROSELIA     Existing Precautions/Restrictions fall;swallow precautions  aspiration precautions, LLE WBAT last note prior admit vs FWB per pt with offloading shoe, L toe amputation 8/22.  -ROSELIA     Barriers to Rehab previous functional deficit  -ROSELIA     Row Name 11/15/22 1141          Occupational Profile    Environmental Supports and Barriers (Occupational Profile) wx in shower with shower seat and 2 grab bars, 4 steps in with R railing, per pt. hard to get up, good height toilet per pt  -ROSELIA     Row Name 11/15/22 1141          Living Environment    People in Home spouse  per pt. spouse in excellent health to assist him with BADLs and does all HM tasks  -ROSELIA     Row Name 11/15/22 1141          Home Main Entrance    Number of Stairs, Main Entrance four  -ROSELIA     Stair Railings, Main Entrance railing on right side (ascending)  -ROSELIA     Row Name 11/15/22 1141          Stairs Within Home, Primary    Stairs Comment, Within Home, Primary can stay one level  -ROSELIA     Row Name 11/15/22 1141          Cognition    Orientation Status (Cognition) oriented x 3  -     Row Name 11/15/22 1141          Safety Issues, Functional Mobility    Impairments Affecting Function (Mobility) balance;endurance/activity tolerance;strength;range of motion (ROM);sensation/sensory awareness  -           User Key  (r) = Recorded By, (t) = Taken By, (c) = Cosigned By    Initials Name Provider Type    Renae Morales OT Occupational Therapist                 Mobility/ADL's     Row Name 11/15/22 1146          Bed Mobility    Bed Mobility supine-sit  -ROSELIA     Supine-Sit [place] (Bed Mobility) standby assist  -     Bed Mobility, Safety Issues decreased use of arms for pushing/pulling;decreased use of  legs for bridging/pushing  -     Assistive Device (Bed Mobility) bed rails;head of bed elevated  -     Row Name 11/15/22 1146          Transfers    Transfers sit-stand transfer;stand-sit transfer  -     Row Name 11/15/22 [facility]6          Sit-Stand Transfer    Sit-Stand [place] (Transfers) contact guard  -     Assistive Device (Sit-Stand Transfers) walker, front-wheeled  -ROSELIA     Row Name 11/15/22 1146          Stand-Sit Transfer    Stand-Sit [place] (Transfers) contact guard  -     Assistive Device (Stand-Sit Transfers) walker, front-wheeled  -ROSELIA     Comment, (Stand-Sit Transfer) pt. had UE's back, but poor ability to lower slowly, per pt. lift chair use at home  -     Row Name 11/15/22 1146          Functional Mobility    Functional Mobility- Ind. Level contact guard assist;1 person + 1 person to manage equipment  -     Functional Mobility- Device walker, front-wheeled  -     Functional Mobility-Distance (Feet) --  less than household distance tolerated  -     Functional Mobility- Safety Issues supplemental O2  -     Row Name 11/15/22 1146          Activities of Daily Living    BADL Assessment/Intervention lower body dressing;upper body dressing;grooming  -     Row Name 11/15/22 114          Lower Body Dressing Assessment/Training    [place] Level (Lower Body Dressing) doff;don;shoes/slippers;socks;dependent (less than 25% patient effort)  -ROSELIA     Position (Lower Body Dressing) edge of bed sitting  -ROSELIA     Comment, (Lower Body Dressing) per pt. he has AE at home, but does not like to use, just has wife perform for him, bandage half undone and rewrapped and tapped per pt. request  -     Row Name 11/15/22 114          Upper Body Dressing Assessment/Training    [place] Level (Upper Body Dressing) doff;don;minimum assist (75% patient effort)  -ROSELIA     Position (Upper Body Dressing) supine  -ROSELIA     Comment, (Upper Body Dressing) gown and covers soiled with urine, gown changed out  with assist for tie at neck  -     Row Name 11/15/22 1146          Grooming Assessment/Training    Comment, (Grooming) declined  -           User Key  (r) = Recorded By, (t) = Taken By, (c) = Cosigned By    Initials Name Provider Type    Renae Morales, OT Occupational Therapist               Obj/Interventions     Row Name 11/15/22 1149          Sensory Assessment (Somatosensory)    Sensory Assessment (Somatosensory) UE sensation intact  -     Sensory Assessment pt. reports numb feet and diminished sensation rest of LE's  -     Row Name 11/15/22 1149          Vision Assessment/Intervention    Visual Impairment/Limitations WFL  functional for evaluation purposes  -     Row Name 11/15/22 1149          Range of Motion Comprehensive    General Range of Motion upper extremity range of motion deficits identified  -     Comment, General Range of Motion per pt. with fall in in July and with L shoulder dislocation and to just start therapy soon, pt. moving grossly 45-80 with observation of task completion, R shoulder WFL  -[facility] Name 11/15/22 1149          Strength Comprehensive (MMT)    General Manual Muscle Testing (MMT) Assessment upper extremity strength deficits identified  -     Comment, General Manual Muscle Testing (MMT) Assessment distally BUE 5/5, R shoulder 4+/5, L 3-/5 grossly  -[facility] Name 11/15/22 1149          Motor Skills    Motor Skills functional endurance  -     Functional Endurance pt. tolerated only far side of bed to recliner verbalizing fatigue  -[facility] Name 11/15/22 1141          Balance    Balance Assessment sitting static balance;sitting dynamic balance;standing static balance;standing dynamic balance  -     Static Sitting Balance independent  -     Dynamic Sitting Balance supervision  -     Position, Sitting Balance unsupported;sitting edge of bed  -     Static Standing Balance contact guard  -     Dynamic Standing Balance contact guard  -      Position/Device Used, Standing Balance supported;walker, rolling  -ROSELIA     Balance Interventions sit to stand;occupation based/functional task  -ROSELIA           User Key  (r) = Recorded By, (t) = Taken By, (c) = Cosigned By    Initials Name Provider Type    Renae Morales, OT Occupational Therapist               Goals/Plan     Row Name 11/15/22 1152          Bed Mobility Goal 1 (OT)    Activity/Assistive Device (Bed Mobility Goal 1, OT) bed mobility activities, all  -ROSELIA     [place] Level/Cues Needed (Bed Mobility Goal 1, OT) independent  -ROSELIA     Time Frame (Bed Mobility Goal 1, OT) long term goal (LTG);10 days  -ROSELIA     Progress/Outcomes (Bed Mobility Goal 1, OT) new goal  -ROSELIA     Row Name 11/15/22 1152          Transfer Goal 1 (OT)    Activity/Assistive Device (Transfer Goal 1, OT) toilet;commode, bedside with drop arms;commode;walker, rolling  grab bar  -ROSELIA     [place] Level/Cues Needed (Transfer Goal 1, OT) standby assist  -ROSELIA     Time Frame (Transfer Goal 1, OT) long term goal (LTG);10 days  -ROSELIA     Strategies/Barriers (Transfers Goal 1, OT) good safety  -ROSELIA     Progress/Outcome (Transfer Goal 1, OT) new goal  -ROSELIA     Row Name 11/15/22 1152          Toileting Goal 1 (OT)    Activity/Device (Toileting Goal 1, OT) toileting skills, all;commode chair;commode;grab bar/safety frame  -ROSELIA     [place] Level/Cues Needed (Toileting Goal 1, OT) standby assist  -ROSELIA     Time Frame (Toileting Goal 1, OT) long term goal (LTG);10 days  -ROSELIA     Strategies/Barriers (Toileting Goal 1, OT) good safety  -ROSELIA     Progress/Outcome (Toileting Goal 1, OT) new goal  -ROSELIA     Row Name 11/15/22 2080          Grooming Goal 1 (OT)    Activity/Device (Grooming Goal 1, OT) hair care;oral care;wash face, hands  -ROSELIA     [place] (Grooming Goal 1, OT) standby assist  -ROSELIA     Time Frame (Grooming Goal 1, OT) long term goal (LTG);10 days  -ROSELIA     Strategies/Barriers (Grooming Goal 1, OT) sinkside  -ROSELIA     Progress/Outcome (Grooming  Goal 1, OT) new goal  -ROSELIA     Row Name 11/15/22 1152          Problem Specific Goal 1 (OT)    Problem Specific Goal 1 (OT) Pt. will complete 10-15 reps each BUE AAROM to resistive TE to support BADL independence.  -ROSELIA     Time Frame (Problem Specific Goal 1, OT) long term goal (LTG);10 days  -ROSELIA     Progress/Outcome (Problem Specific Goal 1, OT) new goal  -ROSELIA     Row Name 11/15/22 1152          Therapy Assessment/Plan (OT)    Planned Therapy Interventions (OT) activity tolerance training;BADL retraining;functional balance retraining;occupation/activity based interventions;ROM/therapeutic exercise;strengthening exercise;patient/caregiver education/training;transfer/mobility retraining  -ROSELIA           User Key  (r) = Recorded By, (t) = Taken By, (c) = Cosigned By    Initials Name Provider Type    Renae Morales, OT Occupational Therapist               Clinical Impression     Row Name 11/15/22 1156 11/15/22 1152       Pain Assessment    Pretreatment Pain Rating 0/10 - no pain  -ROSELIA --  -ROSELIA    Posttreatment Pain Rating 0/10 - no pain  -ROSELIA --  -ROSELIA    Row Name 11/15/22 1156          Plan of Care Review    Plan of Care Reviewed With patient  -ROSELIA     Progress no change  -ROSELIA     Outcome Evaluation OT evaluation completed as pt. willing.  Pt. demonstrated impaired ROM, strength, balance, sensation and endurance/activity tolerance impacting BADL independence.  Pt. reports using a lift chair and walker at home in addition to bathroom AD.  Per pt. wife does his LBB and LBD, but does have AE, although does not like to use. Pt. SBA OOB, CGA with transfers and dependent LBD, min A UBD.  Pt. appropriate for skilled OT services to address defcit aeras and promote return to higher BADL independence. IRF would be beneficial, but if pt. declines recommend home with 24/7 assist and HH therapy vs OP therapy pending progress.  -ROSELIA     Row Name 11/15/22 1156          Therapy Assessment/Plan (OT)    Patient/Family Therapy Goal Statement  (OT) Pt. unable to state.  -ROSELIA     Rehab Potential (OT) fair, will monitor progress closely  -ROSELIA     Criteria for Skilled Therapeutic Interventions Met (OT) yes;meets criteria;skilled treatment is necessary  -ROSELIA     Therapy Frequency (OT) daily  -     Row Name 11/15/22 1156          Therapy Plan Review/Discharge Plan (OT)    Anticipated Discharge Disposition (OT) inpatient rehabilitation facility  -     Row Name 11/15/22 1156          Vital Signs    Pre Systolic BP Rehab 106  -ROSELIA     Pre Treatment Diastolic BP 69  -ROSELIA     Post Systolic BP Rehab 145  -ROSELIA     Post Treatment Diastolic BP 84  -ROSELIA     Pretreatment Heart Rate (beats/min) 60  -ROSELIA     Posttreatment Heart Rate (beats/min) 60  -ROSELIA     Pre SpO2 (%) 96  -ROSELIA     O2 Delivery Pre Treatment nasal cannula  -ROSELIA     O2 Delivery Intra Treatment nasal cannula  -ROSELIA     Post SpO2 (%) 98  -ROSELIA     O2 Delivery Post Treatment nasal cannula  -ROSELIA     Pre Patient Position Supine  -ROSELIA     Intra Patient Position Standing  -ROSELIA     Post Patient Position Sitting  -     Row Name 11/15/22 1156          Positioning and Restraints    Pre-Treatment Position in bed  -ROSELIA     Post Treatment Position chair  -ROSELIA     In Chair reclined;call light within reach;notified nsg;encouraged to call for assist;exit alarm on;waffle cushion;legs elevated  heels off end of chair  -ROSELIA           User Key  (r) = Recorded By, (t) = Taken By, (c) = Cosigned By    Initials Name Provider Type    Renae Morales, OT Occupational Therapist               Outcome Measures     Row Name 11/15/22 9141          How much help from another is currently needed...    Putting on and taking off regular lower body clothing? 2  -ROSELIA     Bathing (including washing, rinsing, and drying) 2  -ROSELIA     Toileting (which includes using toilet bed pan or urinal) 3  -ROSELIA     Putting on and taking off regular upper body clothing 3  -ROSELIA     Taking care of personal grooming (such as brushing teeth) 4  setup sitting  -ROSELIA     Eating meals  4  -[facility] 6 Clicks Score (OT) 18  -     Row Name 11/15/22 1155          Functional Assessment    Outcome Measure Options AM-PAC 6 Clicks Daily Activity (OT)  -           User Key  (r) = Recorded By, (t) = Taken By, (c) = Cosigned By    Initials Name Provider Type    Renae Morales OT Occupational Therapist                Occupational Therapy Education     Title: PT OT SLP Therapies (In Progress)     Topic: Occupational Therapy (In Progress)     Point: ADL training (Done)     Description:   Instruct learner(s) on proper safety adaptation and remediation techniques during self care or transfers.   Instruct in proper use of assistive devices.              Learning Progress Summary           Patient Acceptance, E, VU by ROSELIA at 11/15/2022 1156    Comment: reason for consult, benefit of therapy                   Point: Home exercise program (Not Started)     Description:   Instruct learner(s) on appropriate technique for monitoring, assisting and/or progressing therapeutic exercises/activities.              Learner Progress:  Not documented in this visit.          Point: Precautions (Not Started)     Description:   Instruct learner(s) on prescribed precautions during self-care and functional transfers.              Learner Progress:  Not documented in this visit.          Point: Body mechanics (Not Started)     Description:   Instruct learner(s) on proper positioning and spine alignment during self-care, functional mobility activities and/or exercises.              Learner Progress:  Not documented in this visit.                      User Key     Initials Effective Dates Name Provider Type Discipline    ROSELIA 06/16/21 -  Renae Rosa OT Occupational Therapist OT              OT Recommendation and Plan  Planned Therapy Interventions (OT): activity tolerance training, BADL retraining, functional balance retraining, occupation/activity based interventions, ROM/therapeutic exercise, strengthening exercise,  patient/caregiver education/training, transfer/mobility retraining  Therapy Frequency (OT): daily  Plan of Care Review  Plan of Care Reviewed With: patient  Progress: no change  Outcome Evaluation: OT evaluation completed as pt. willing.  Pt. demonstrated impaired ROM, strength, balance, sensation and endurance/activity tolerance impacting BADL independence.  Pt. reports using a lift chair and walker at home in addition to bathroom AD.  Per pt. wife does his LBB and LBD, but does have AE, although does not like to use. Pt. SBA OOB, CGA with transfers and dependent LBD, min A UBD.  Pt. appropriate for skilled OT services to address defcit aeras and promote return to higher BADL independence. IRF would be beneficial, but if pt. declines recommend home with 24/7 assist and HH therapy vs OP therapy pending progress.     Time Calculation:    Time Calculation- OT     Row Name 11/15/22 1201             Time Calculation- OT    OT Start Time 1110  -ROSELIA      OT Received On 11/15/22  -ROSELIA      OT Goal Re-Cert Due Date 11/25/22  -ROSELIA         Untimed Charges    OT Eval/Re-eval Minutes 54  -ROSELIA         Total Minutes    Untimed Charges Total Minutes 54  -ROSELIA       Total Minutes 54  -ROSELIA            User Key  (r) = Recorded By, (t) = Taken By, (c) = Cosigned By    Initials Name Provider Type    Renae Morales OT Occupational Therapist              Therapy Charges for Today     Code Description Service Date Service Provider Modifiers Qty    01371322726 HC OT EVAL LOW COMPLEXITY 4 11/15/2022 Renae Rosa OT GO 1               Renae Rosa OT  11/15/2022

## 2022-11-15 NOTE — THERAPY EVALUATION
Acute Care - Speech Language Pathology   Swallow Initial Evaluation [facility]   Clinical Swallow Evaluation       Patient Name: Jermaine Singh  : 1945  MRN: [id number]  Today's Date: 11/15/2022               Admit Date: 2022    Visit Dx:     ICD-10-CM ICD-9-CM   1. Acute on chronic HFrEF (heart failure with reduced ejection fraction) ([facility])  I50.23 428.23   2. Unstable angina ([facility])  I20.0 411.1   3. Shortness of breath  R06.02 786.05   4. Pleural effusion  J90 511.9   5. 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  Preserved LV function   • Type 2 diabetes mellitus ([facility])   • Hypertension   • Hyperlipidemia   • Hypothyroidism (acquired)   • Vitamin D deficiency on Rx    • Diabetic neuropathy   • RBBB   • S/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 Au   • Atherosclerosis of native artery of both lower extremities with intermittent claudication ([facility])   • SSS (sick sinus syndrome) ([facility])   • Subacute osteomyelitis of left foot ([facility])   • Acute on chronic HFrEF (heart failure with reduced ejection fraction) ([facility])   • DVT, bilateral lower limbs ([facility])   • Elevated troponin   • Cellulitis of right lower extremity   • Obesity (BMI 30.0-34.9)   • Elevated serum creatinine   • Anemia, chronic disease   • Pleural effusion, bilateral   • PVD (peripheral vascular disease) ([facility])   • Status post amputation of great toe, left ([facility])   • Diabetic foot ulcer ([facility])   • Generalized weakness   • GERD without esophagitis   • CKD (chronic kidney disease) stage 3, GFR 30-59 ml/min ([facility])     Past Medical History:   Diagnosis Date   • Allergic    • Arthritis    • Asthma    • Coronary artery disease    • Diabetes mellitus ([facility])     started on in[place] 2018; started on po meds in ; checking blood sugars daily    • Disease of thyroid gland     po meds daily for hypothyroidism    • History of fracture as a child      rt leg- severe    • Hyperlipidemia    • Hypertension    • Hypothyroidism    • Peripheral neuropathy    • Peripheral vascular disease ([facility])     s/p angiogram 2/19-needs stent in left leg    • Proximal phalanx fracture of the second digit extending into the second metatarsal joint 7/28/2022   • RBBB    • Right knee pain    • Vitamin D deficiency      Past Surgical History:   Procedure Laterality Date   • APPENDECTOMY     • CARDIAC CATHETERIZATION N/A 2/14/2019    Procedure: Left Heart Cath;  Surgeon: Cooper Apodaca MD;  Location:  [facility] CATH INVASIVE LOCATION;  Service: Cardiology   • CARDIAC ELECTROPHYSIOLOGY PROCEDURE N/A 5/18/2022    Procedure: DEVICE IMPLANT;  Surgeon: Coopre Apodaca MD;  Location: [facility] CATH INVASIVE LOCATION;  Service: Cardiology;  Laterality: N/A;   • COLONOSCOPY     • CORONARY ARTERY BYPASS GRAFT N/A 3/22/2019    Procedure: MEDIAN STERNOTOMY, CORONARY ARTERY BYPASS GRAFT X3, UTILIZING THE LEFT INTERNAL MAMMARY ARTERY, EVH AND OPEN HARVEST OF THE RIGHT GREATER SAPHENOUS VEIN, EXPLORATION OF THE LEFT LEG;  Surgeon: Ap Au MD;  Location:  [facility] OR;  Service: Cardiothoracic   • EYE SURGERY Bilateral     cataracts    • INTERVENTIONAL RADIOLOGY PROCEDURE N/A 7/29/2021    Procedure: Abdominal Aortagram with Runoff;  Surgeon: Jermaine Hernandez MD;  Location:  [facility] CATH INVASIVE LOCATION;  Service: Cardiovascular;  Laterality: N/A;   • KNEE ARTHROSCOPY      right x 2, left x 1   • LACERATION REPAIR      right leg   • LEG SURGERY      2 for fracture of rt leg    • TONSILLECTOMY      Adnoidectomy   • TRANS METATARSAL AMPUTATION Left 8/2/2022    Procedure: GREAT TOE AMPUTATION LEFT;  Surgeon: Gopal Márquez MD;  Location:  [facility] OR;  Service: Vascular;  Laterality: Left;       SLP Recommendation and Plan  SLP Swallowing Diagnosis: swallow WFL/no suspected pharyngeal impairment (11/15/22 0945)  SLP Diet Recommendation: regular textures, thin liquids (11/15/22 0945)      SLP Rec. for Method of Medication Administration: meds whole, with thin liquids (11/15/22 0945)           Swallow Criteria for Skilled Therapeutic Interventions Met: demonstrates skilled criteria (11/15/22 0945)        Therapy Frequency (Swallow): evaluation only (11/15/22 0945)                                           Plan of Care Reviewed With: patient      SWALLOW EVALUATION (last 72 hours)     SLP Adult Swallow Evaluation     Row Name 11/15/22 0945                   Rehab Evaluation    Document Type evaluation  -VO        Subjective Information no complaints  -VO        Patient Observations alert;cooperative  -VO        Patient Effort good  -VO           General Information    Patient Profile Reviewed yes  -VO        Pertinent History Of Current Problem adm acute on chronic heart failure. Reports of issues getting choked.  -VO        Current Method of Nutrition regular textures;thin liquids  -VO        Precautions/Limitations, Vision WFL;for purposes of eval  -VO        Precautions/Limitations, Hearing WFL  -VO        Prior Level of Function-Communication WFL  -VO        Prior Level of Function-Swallowing no diet consistency restrictions;safe, efficient swallowing in all situations  -VO        Plans/Goals Discussed with patient;agreed upon  -VO        Barriers to Rehab none identified  -VO           Pain    Additional Documentation Pain Scale: Numbers Pre/Post-Treatment (Group)  -VO           Pain Scale: Numbers Pre/Post-Treatment    Pretreatment Pain Rating 0/10 - no pain  -VO        Posttreatment Pain Rating 0/10 - no pain  -VO           Oral Motor Structure and Function    Dentition Assessment natural, present and adequate  -VO        Secretion Management WNL/WFL  -VO        Mucosal Quality moist, healthy  -VO           Oral Musculature and Cranial Nerve Assessment    Oral Motor General Assessment WFL  -VO           General Eating/Swallowing Observations    Respiratory Support Currently in Use nasal  cannula  -VO        Eating/Swallowing Skills self-fed;appropriate self-feeding skills observed  -VO        Positioning During Eating upright 90 degree  -VO        Consistencies Trialed thin liquids;pureed;regular textures  -VO           Clinical Swallow Eval    Oral Prep Phase WFL  -VO        Oral Transit WFL  -VO        Oral Residue WFL  -VO        Pharyngeal Phase WFL  -VO        Clinical Swallow Evaluation Summary No s/sxs aspiration or difficulty w/ any consistency trialed. Ok for regular diet, thin liquids.  -VO           SLP Evaluation Clinical Impression    SLP Swallowing Diagnosis swallow WFL/no suspected pharyngeal impairment  -VO        Functional Impact no impact on function  -VO        Swallow Criteria for Skilled Therapeutic Interventions Met demonstrates skilled criteria  -VO           Recommendations    Therapy Frequency (Swallow) evaluation only  -VO        SLP Diet Recommendation regular textures;thin liquids  -VO        Oral Care Recommendations Oral Care BID/PRN;Toothbrush  -VO        SLP Rec. for Method of Medication Administration meds whole;with thin liquids  -VO              User Key  (r) = Recorded By, (t) = Taken By, (c) = Cosigned By    Initials Name Effective Dates    VO Shawnee Thomas MA,CCC-SLP 06/16/21 -                 EDUCATION  The patient has been educated in the following areas:   Dysphagia (Swallowing Impairment).              Time Calculation:    Time Calculation- SLP     Row Name 11/15/22 1151             Time Calculation- SLP    SLP Start Time 0945  -VO      SLP Received On 11/15/22  -VO         Untimed Charges    26278-GO Eval Oral Pharyng Swallow Minutes 38  -VO         Total Minutes    Untimed Charges Total Minutes 38  -VO       Total Minutes 38  -VO            User Key  (r) = Recorded By, (t) = Taken By, (c) = Cosigned By    Initials Name Provider Type    Shawnee Arango MA,CCC-SLP Speech and Language Pathologist                Therapy Charges for Today      Code Description Service Date Service Provider Modifiers Qty    [id number]  ST EVAL ORAL PHARYNG SWALLOW 3 11/15/2022 Shawnee Thomas MA,CCC-SLP GN 1               Shawnee Thomas MA,CCC-SLP  11/15/2022

## 2022-11-15 NOTE — PLAN OF CARE
Goal Outcome Evaluation:  Plan of Care Reviewed With: patient            SLP evaluation completed. Will sign-off dysphagia. Please see note for further details and recommendations.

## 2022-11-15 NOTE — H&P
"    [facility] Medicine Services  HISTORY AND PHYSICAL    Patient Name: Jermaine Singh  : 1945  MRN: [id number]  Primary Care Physician: Mj Kennedy DO  Date of admission: 2022    Subjective   Subjective     Chief Complaint:  Shortness of breath     HPI:  Jermaine Singh is a 77 y.o. male w/ a hx of CAD (s/p 3V CABG in 2019), HTN, HLD, CHF, SSS (s/p PPM), bilateral LE DVT on Eliquis, ( on CKD Stage III, T2DM, hypothyroidism, chronic anemia, GERD, PVD (s/p RLE angioplasty 2021), diabetic foot ulcers (s/p recent left great toe 22 amputation and ongoing 2nd left toe ulceration) who presented to the ED w/ c/o shortness of breath.   Pt admitted to [facility] 10/2 -10/11/22. Pt presented w/ dyspnea and BLE edema. Pt had been diagnosed w/ RLE cellulitis just prior to admission and was on Bactrim. Cardiology consulted and pt underwent a Lexiscan on 10/3 that showed anteroseptal scar with no significant ischemia. LVEF 42%. Pt's pacemaker was interrogated. Pt received IV diuresis. Creatinine peaked at 2.18 and improved to 1.61 on d/c. Pt was also seen by Dr. Márquez and ID, Dr. Braga for his RLE cellulitis and foot ulcerations. Pt was d/c on Doxycyline. Pt was ultimately d/c to [facility] for rehabilitation.   Since his d/c pt has been seen in f/u by Dr. Braga w/ ID; pt is to continue Doxy through February. Pt also seen by Dr. Apodaca and his Bumex was increased for 2-3 days due to worsening CHF. Pt seen by Dr. Márquez for a check-up and no changes made.   Pt presented to the ED tonight w/ c/o shortness of breath, chest pressure, orthopnea and generalized weakness. Pt's symptoms became worse 2-3 days ago. Pt notes that he has mild BLE edema also. Pt's wife notes that he has had a dry cough and at times seems to \"get choked up\".   Pt denies fever/chills, N/V/D, abdominal pain, dysuria, syncope, confusion.   Pt evaluated in the ED. ProBNP >26,000. Troponin elevated. CT Chest w/ small to " moderate bilateral effusions and volume overload. Pt admitted to the hospital medicine service for further evaluation.     Review of Systems   Constitutional: Positive for fatigue. Negative for chills, diaphoresis, fever and unexpected weight change.   HENT: Negative.  Negative for congestion, postnasal drip, rhinorrhea, sinus pressure, sinus pain, sneezing, sore throat and trouble swallowing.    Eyes: Negative.  Negative for visual disturbance.   Respiratory: Positive for cough, chest tightness and shortness of breath. Negative for wheezing.    Cardiovascular: Positive for chest pain and leg swelling. Negative for palpitations.   Gastrointestinal: Negative.  Negative for abdominal distention, abdominal pain, blood in stool, constipation, diarrhea, nausea and vomiting.   Endocrine: Negative.    Genitourinary: Negative.  Negative for decreased urine volume, difficulty urinating, dysuria, flank pain, frequency, hematuria and urgency.   Musculoskeletal: Negative for arthralgias, back pain, myalgias, neck pain and neck stiffness.        Generalized weakness.    Skin: Positive for wound.   Allergic/Immunologic:        On long-term antibiotic.    Neurological: Negative.  Negative for dizziness, tremors, seizures, syncope, facial asymmetry, speech difficulty, weakness, light-headedness, numbness and headaches.   Hematological: Negative.    Psychiatric/Behavioral: Negative.  Negative for confusion.   All other systems reviewed and are negative.     Personal History     Past Medical History:   Diagnosis Date   • Allergic    • Arthritis    • Asthma    • Coronary artery disease    • Diabetes mellitus (HCC) 2000    started on inuslin 12/2018; started on po meds in 2000; checking blood sugars daily    • Disease of thyroid gland     po meds daily for hypothyroidism    • History of fracture as a child     rt leg- severe    • Hyperlipidemia    • Hypertension    • Hypothyroidism    • Peripheral neuropathy    • Peripheral  vascular disease (HCC)     s/p angiogram 2/19-needs stent in left leg    • Proximal phalanx fracture of the second digit extending into the second metatarsal joint 7/28/2022   • RBBB    • Right knee pain    • Vitamin D deficiency      Past Surgical History:   Procedure Laterality Date   • APPENDECTOMY     • CARDIAC CATHETERIZATION N/A 2/14/2019    Procedure: Left Heart Cath;  Surgeon: Cooper Apodaca MD;  Location:  [facility] CATH INVASIVE LOCATION;  Service: Cardiology   • CARDIAC ELECTROPHYSIOLOGY PROCEDURE N/A 5/18/2022    Procedure: DEVICE IMPLANT;  Surgeon: Cooper Apodaca MD;  Location: [facility] CATH INVASIVE LOCATION;  Service: Cardiology;  Laterality: N/A;   • COLONOSCOPY     • CORONARY ARTERY BYPASS GRAFT N/A 3/22/2019    Procedure: MEDIAN STERNOTOMY, CORONARY ARTERY BYPASS GRAFT X3, UTILIZING THE LEFT INTERNAL MAMMARY ARTERY, EVH AND OPEN HARVEST OF THE RIGHT GREATER SAPHENOUS VEIN, EXPLORATION OF THE LEFT LEG;  Surgeon: Ap Au MD;  Location:  [facility];  Service: Cardiothoracic   • EYE SURGERY Bilateral     cataracts    • INTERVENTIONAL RADIOLOGY PROCEDURE N/A 7/29/2021    Procedure: Abdominal Aortagram with Runoff;  Surgeon: Jermaine Hernandez MD;  Location:  [facility] CATH INVASIVE LOCATION;  Service: Cardiovascular;  Laterality: N/A;   • KNEE ARTHROSCOPY      right x 2, left x 1   • LACERATION REPAIR      right leg   • LEG SURGERY      2 for fracture of rt leg    • TONSILLECTOMY      Adnoidectomy   • TRANS METATARSAL AMPUTATION Left 8/2/2022    Procedure: GREAT TOE AMPUTATION LEFT;  Surgeon: Gopal Márquez MD;  Location:  [facility];  Service: Vascular;  Laterality: Left;     Family History: family history includes Cancer in his father; Coronary artery disease in his mother; Diabetes in his mother. Otherwise pertinent FHx was reviewed and unremarkable.     Social History:  reports that he has never smoked. He has never used smokeless tobacco. He reports that he does not drink alcohol and  does not use drugs.  Social History     Social History Narrative    Lives in [place].     Medications:  Diclofenac Sodium, Insulin Pen Needle, Probiotic Product, acetaminophen, apixaban, aspirin, atorvastatin, bumetanide, carvedilol, docusate sodium, famotidine, hydrALAZINE, insulin aspart, insulin detemir, levothyroxine, magnesium oxide, metoprolol succinate XL, nitroglycerin, nystatin, sacubitril-valsartan, sennosides-docusate, tamsulosin, and vitamin D3    Allergies   Allergen Reactions   • Vancomycin Other (See Comments)     Kidney failure per last admission     Objective   Objective     Vital Signs:   Temp:  [97.7 °F (36.5 °C)] 97.7 °F (36.5 °C)  Heart Rate:  [65-71] 65  Resp:  [18] 18  BP: (124-165)/() 124/77    Physical Exam     Constitutional: Awake, alert; chronically ill appearing   Eyes: PERRLA, sclerae anicteric, no conjunctival injection  HENT: NCAT, mucous membranes moist  Neck: Supple, no thyromegaly, no lymphadenopathy, trachea midline  Respiratory: diminished w/ faint rhonchi bilateral lower lobes; nonlabored respirations   Cardiovascular: RRR, no murmurs, rubs, or gallops, +1-2 pitting edema BLE   Gastrointestinal: Positive bowel sounds, soft, nontender, nondistended  Musculoskeletal: Normal ROM bilaterally; generalized weakness   Psychiatric: Appropriate affect, cooperative  Neurologic: Oriented x 3, strength symmetric in all extremities, Cranial Nerves grossly intact to confrontation, speech clear  Skin: No rashes; left foot- s/p great toe amputation w/ healing wound, 2nd toe left foot w/ ulceration- scant drainage noted, no significant edema or erythema     Result Review:  I have personally reviewed the results from the time of this admission to 11/15/2022 03:02 EST and agree with these findings:  [x]  Laboratory list / accordion  []  Microbiology  [x]  Radiology  [x]  EKG/Telemetry   []  Cardiology/Vascular   []  Pathology  [x]  Old records    LAB RESULTS:      Lab 11/15/22  0001  11/14/22 2036   WBC  --  6.42   HEMOGLOBIN  --  10.9*   HEMATOCRIT  --  34.7*   PLATELETS  --  166   NEUTROS ABS  --  4.38   IMMATURE GRANS (ABS)  --  0.01   LYMPHS ABS  --  1.12   MONOS ABS  --  0.75   EOS ABS  --  0.12   MCV  --  91.6   PROCALCITONIN 0.06  --    LACTATE  --  1.3         Lab 11/14/22 2036 11/14/22  1155   SODIUM 142 141   POTASSIUM 5.1 4.7   CHLORIDE 103 101   CO2 29.0 28.4   ANION GAP 10.0 11.6   BUN 43* 43*   CREATININE 1.95* 1.60*   EGFR 34.8* 44.1*   GLUCOSE 177* 201*   CALCIUM 9.3 9.4         Lab 11/14/22 2036   TOTAL PROTEIN 6.4   ALBUMIN 3.80   GLOBULIN 2.6   ALT (SGPT) 55*   AST (SGOT) 23   BILIRUBIN 0.3   ALK PHOS 168*   LIPASE 10*         Lab 11/15/22  0001 11/14/22 2036 11/14/22  1155   PROBNP  --  26,381.0* 23,706.0*   TROPONIN T 0.076* 0.074*  --                  Brief Urine Lab Results  (Last result in the past 365 days)      Color   Clarity   Blood   Leuk Est   Nitrite   Protein   CREAT   Urine HCG        08/23/22 1309             59.2         08/23/22 1309 [place]   Cloudy   Large (3+)   Trace   Negative   30 mg/dL (1+)               Microbiology Results (last 10 days)     ** No results found for the last 240 hours. **        CT Chest Without Contrast Diagnostic    Result Date: 11/14/2022  DATE OF EXAM: 11/14/2022 10:24 PM  PROCEDURE: CT CHEST WO CONTRAST DIAGNOSTIC-  INDICATIONS: Questionable infiltrates on chest x-ray, shortness of breath  COMPARISON: Chest radiograph performed the same day. Chest CT 08/12/2022.  TECHNIQUE: Routine transaxial slices were obtained through the chest without the administration of intravenous contrast. Reconstructed coronal and sagittal images were also obtained. Automated exposure control and iterative construction methods were used.  The radiation dose reduction device was turned on for each scan per the ALARA (As Low as Reasonably Achievable) protocol.  FINDINGS: There are small to moderate bilateral pleural effusions, similar to the prior chest  CT. No pneumothorax is seen. Respiratory motion somewhat limits evaluation. There is mild consolidation adjacent to the effusions. No other significant infiltrate is seen at this time. Central airways appear patent. The nodules previously seen in the anterior right base are not visualized on this exam.   The heart appears enlarged. Status post median sternotomy. Pacemaker is present. No pericardial effusion. There is advanced calcific atherosclerosis of the coronary arteries. No definite acute aortic abnormality is seen. There is aortic atherosclerosis. There is a small hiatal hernia. No significant lymphadenopathy is seen.  There is diffuse body wall edema. There is a small amount of ascites. Calcified gallstones are noted.  Degenerative changes are present in the spine. No aggressive osseous lesion is seen.      Impression: 1.  Small to moderate bilateral pleural effusions with mild adjacent consolidation, likely atelectasis, but pneumonia is not excluded. 2.  Body wall edema and small amount of ascites which may indicate heart failure/volume overload. 3.  Cardiomegaly. Calcific atherosclerosis. 4.  Cholelithiasis.  This report was finalized on 11/14/2022 11:13 PM by Eyad Wilburn MD.      XR Chest 1 View    Result Date: 11/14/2022  DATE OF EXAM: 11/14/2022 9:12 PM  PROCEDURE: XR CHEST 1 VW-  INDICATIONS: Chest Pain Triage Protocol  COMPARISON: 10/02/2022.  TECHNIQUE: Single radiographic view of the chest was obtained.  FINDINGS: There is suspicion for small pleural effusions. No pneumothorax. There are mild left basilar opacities.  Status post median sternotomy and CABG. The heart appears mildly enlarged. Pacemaker electrodes appear in expected positions.  Previously seen right lung opacities have improved.      Impression: 1.  Cardiomegaly. 2.  Small pleural effusions. 3.  Mild left basilar opacities which may represent atelectasis or infiltrate.  This report was finalized on 11/14/2022 9:45 PM by Eyad Wilburn  MD.      Results for orders placed during the hospital encounter of 07/27/22    Adult Transthoracic Echo Complete W/ Cont if Necessary Per Protocol    Interpretation Summary  · Left ventricular ejection fraction appears to be 46 - 50%. Left ventricular systolic function is low normal.  · Left ventricular septal hypokinesis  · No significant valvular heart disease    Assessment & Plan   Assessment & Plan       Acute on chronic HFrEF (heart failure with reduced ejection fraction) ([facility])    Type 2 diabetes mellitus ([facility])    Hypertension    Hyperlipidemia    Hypothyroidism (acquired)    S/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 Au    SSS (sick sinus syndrome) ([facility])    Elevated troponin    Elevated serum creatinine    Anemia, chronic disease    Pleural effusion, bilateral    PVD (peripheral vascular disease) ([facility])    Status post amputation of great toe, left ([facility])    Diabetic foot ulcer ([facility])    Generalized weakness    GERD without esophagitis    CKD (chronic kidney disease) stage 3, GFR 30-59 ml/min ([facility])    Jermaine Singh is a 77 y.o. male w/ a hx of CAD (s/p 3V CABG in 2019), HTN, HLD, CHF, SSS (s/p PPM), bilateral LE DVT on Eliquis, (8/22 on CKD Stage III, T2DM, hypothyroidism, chronic anemia, GERD, PVD (s/p RLE angioplasty 7/2021), diabetic foot ulcers (s/p recent left great toe 8/2/22 amputation and ongoing 2nd left toe ulceration) who presented to the ED w/ c/o shortness of breath.     **Acute/chronic systolic CHF exacerbation   **Bilateral pleural effusions   **Elevated troponin   **CAD (s/p CABG)   **HTN, HLD   **SSS (s/p PPM)   -recent admission for same   -known to Dr. Apodaca; consulted to see in am   -CHF navigator consult in am   -CT with bilateral pleural effusions and adjacent consolidation, likely atelectasis.  Pneumonia not excluded.  Monitor for signs of infection, low threshold to broaden antibiotic coverage  -proBNP >26,000  -troponin 0.074, 0.076; trend (appears chronically elevated)   -s/p Bumex 2mg IV  given in ED; repeat dose at 8am and re-evaluate need for further IV diuresis   -holding oral Bumex  -continue routine ASA (given ASA 324mg in ED)  -continue routine statin, coreg, hydralazine, entresto  -daily wts; strict I/Os  -last ECHO 8/22 w/ EF 46-50%  -recent Lexiscan 10/3 that showed anteroseptal scar with no significant ischemia. LVEF 42%  -symptom mgt     **Diabetic foot ulcer (2nd toe left foot)   **S/p left great toe amputation (8/2/22)   **PVD   -pt follows w/ Dr. Márquez and Dr. Braga w/ ID; consult PRN   -on Doxycyline BID through February; continued   -afebrile   -WBC WNL   -procal and lactic acid WNL  -sed rate and crp pending   -WOC consult     **Elevated creatinine   **CKD Stage III  -baseline CR ~1.6-1.7  -current CR 1.95 w/ eGFR 34.8  -UA pending   -monitor closely w/ IV diuresis   -avoid nephrotoxic meds   -consider Nephrology consult    -measure post void residual    **Generalized weakness  -pt,ot and case mgt consult   -speech consulted also as wife mentioned pt possibly choking intermittently??     **BLE DVTs (dx 8/22)   -continue Eliquis     **T2DM  -hem a1c pending   -holding routine novolog and Levemir (8 units)   -FSBG TID w/ LDSS    **Hypothyroidism   -tsh pending  -continue synthroid     **GERD  -continue Pepcid     DVT prophylaxis:  Eliquis     CODE STATUS:   Level Of Support Discussed With: Health Care Surrogate  Code Status (Patient has no pulse and is not breathing): CPR (Attempt to Resuscitate)  Medical Interventions (Patient has pulse or is breathing): Full Support    This note has been completed as part of a split-shared workflow.     Signature: Electronically signed by DAVID Dunn, 11/15/22, 3:02 AM EST.      Attending   Admission Attestation       I have performed an independent face-to-face diagnostic evaluation including performing an independent physical examination as documented here.  The documented plan of care above was reviewed and developed with the advanced  practice clinician (APC).      Brief Summary Statement:   Jermaine Singh is a 77 y.o. male with a PMH significant for systolic CHF, CAD s/p CABG, HTN, HLD, SSS s/p pacemaker, history of DVT on Eliquis, CKD, diabetes mellitus type 2, hypothyroidism, PVD, chronic anemia, GERD, diabetic foot ulcers status post left great toe amputation 8/2022 who comes to the ED due to shortness of breath.  Patient was recently hospitalized at [facility] where he was treated for CHF exacerbation.  He was discharged to [facility] rehab.  Wife at bedside reports progressive weakness, lower extremity edema, chills, chest pain, SOB.  Patient weighed 230 lbs at discharge from [facility] rehab, weight increased to 248 after d/c home.  Dr. Singer changed his diuretic regimen, he currently is weighing 240 pounds but feels like he is retaining fluid.    Remainder of detailed HPI is as noted by APC and has been reviewed and/or edited by me for completeness.    Attending Physical Exam:  Temp:  [97.7 °F (36.5 °C)] 97.7 °F (36.5 °C)  Heart Rate:  [65-71] 65  Resp:  [18] 18  BP: (124-165)/() 124/77    Constitutional: Asleep, arousable  Eyes: PERRLA, sclerae anicteric, no conjunctival injection  HENT: NCAT, mucous membranes moist  Neck: Supple, no thyromegaly, no lymphadenopathy, trachea midline  Respiratory: Clear to auscultation bilaterally, nonlabored respirations   Cardiovascular: RRR, no murmurs, rubs, or gallops, palpable pedal pulses bilaterally  Gastrointestinal: Positive bowel sounds, soft, nontender, nondistended  Musculoskeletal: 1-2+ bilateral ankle edema, left great toe amputation, no clubbing or cyanosis to extremities  Psychiatric: Sleepy, not cooperative with exam or interview  Neurologic: Unable to assess, largely nonverbal  Skin: Erythema to left medial distal foot, dry ulceration to left second toe      Brief Assessment/Plan :  See detailed assessment and plan developed with APC which I have reviewed and/or edited for  completeness.    Daxa Hannon, DO  11/15/22

## 2022-11-15 NOTE — NURSING NOTE
Referral received for Phase II Cardiac Rehab. Staff will continue to follow this patient to determine eligibility for Phase II Cardiac Rehab program.

## 2022-11-15 NOTE — ED PROVIDER NOTES
"  [facility]    EMERGENCY DEPARTMENT ENCOUNTER      Pt Name: Jermaine Singh  MRN: [id number]  YOB: 1945  Date of evaluation: 11/14/2022  Provider: RANULFO Uribe    CHIEF COMPLAINT       Chief Complaint   Patient presents with   • Chest Pain         HISTORY OF PRESENT ILLNESS  (Location/Symptom, Timing/Onset, Context/Setting, Quality, Duration, Modifying Factors, Severity.)   Jermaine Singh is a 77 y.o. male who presents to the emergency department with complaints of chest pain and shortness of breath.  Patient reports that he began to experience pain in his chest starting yesterday.  He also has noticed increased shortness of breath.  He reports that the pain is exacerbated when he lays flat.  He has been prescribed Bumex to assist with volume overload and noticed that his weights have been trending down but recently have gone back up again.  He has not taken any of his evening medications together.  He reports that the pain is a midsternal chest pain that he describes as a \"heaviness\".  Patient has past medical history significant for triple bypass, stents, pacemaker implantation and diabetes.  He has recently been admitted to this facility twice since July.  He follows under Dr. Apodaca with cardiology and Dr. Braga with infectious disease.  He has also been followed by Dr. Oro for a recent amputation of his left toe.  Patient also has been treated recently for a cellulitis of his right leg which she states has gotten significantly better.    HPI   Nursing notes were reviewed.    REVIEW OF SYSTEMS    (2-9 systems for level 4, 10 or more for level 5)   Review of Systems   Constitutional: Positive for activity change.   HENT: Negative.    Respiratory: Positive for chest tightness and shortness of breath.    Cardiovascular: Positive for chest pain and leg swelling.   Gastrointestinal: Positive for abdominal distention and abdominal pain. Negative for constipation, diarrhea, nausea and vomiting. "   Genitourinary: Positive for frequency.        All systems reviewed and negative except for those discussed in HPI.   PAST MEDICAL HISTORY     Past Medical History:   Diagnosis Date   • Allergic    • Arthritis    • Asthma    • Coronary artery disease    • Diabetes mellitus (HCC) 2000    started on inuslin 12/2018; started on po meds in 2000; checking blood sugars daily    • Disease of thyroid gland     po meds daily for hypothyroidism    • History of fracture as a child     rt leg- severe    • Hyperlipidemia    • Hypertension    • Hypothyroidism    • Peripheral neuropathy    • Peripheral vascular disease (HCC)     s/p angiogram 2/19-needs stent in left leg    • RBBB    • Right knee pain    • Vitamin D deficiency          SURGICAL HISTORY       Past Surgical History:   Procedure Laterality Date   • APPENDECTOMY     • CARDIAC CATHETERIZATION N/A 2/14/2019    Procedure: Left Heart Cath;  Surgeon: Cooper Apodaca MD;  Location:  [facility] CATH INVASIVE LOCATION;  Service: Cardiology   • CARDIAC ELECTROPHYSIOLOGY PROCEDURE N/A 5/18/2022    Procedure: DEVICE IMPLANT;  Surgeon: Cooper Apodaca MD;  Location:  [facility] CATH INVASIVE LOCATION;  Service: Cardiology;  Laterality: N/A;   • COLONOSCOPY     • CORONARY ARTERY BYPASS GRAFT N/A 3/22/2019    Procedure: MEDIAN STERNOTOMY, CORONARY ARTERY BYPASS GRAFT X3, UTILIZING THE LEFT INTERNAL MAMMARY ARTERY, EVH AND OPEN HARVEST OF THE RIGHT GREATER SAPHENOUS VEIN, EXPLORATION OF THE LEFT LEG;  Surgeon: Ap Au MD;  Location: [facility] OR;  Service: Cardiothoracic   • EYE SURGERY Bilateral     cataracts    • INTERVENTIONAL RADIOLOGY PROCEDURE N/A 7/29/2021    Procedure: Abdominal Aortagram with Runoff;  Surgeon: Jermaine Hernandez MD;  Location:  [facility] CATH INVASIVE LOCATION;  Service: Cardiovascular;  Laterality: N/A;   • KNEE ARTHROSCOPY      right x 2, left x 1   • LACERATION REPAIR      right leg   • LEG SURGERY      2 for fracture of rt leg    •  TONSILLECTOMY      Adnoidectomy   • TRANS METATARSAL AMPUTATION Left 8/2/2022    Procedure: GREAT TOE AMPUTATION LEFT;  Surgeon: Gopal Márquez MD;  Location: [facility];  Service: Vascular;  Laterality: Left;         CURRENT MEDICATIONS       Current Facility-Administered Medications:   •  aspirin chewable tablet 324 mg, 324 mg, Oral, Once, Timothy Khanna,   •  sodium chloride 0.9 % flush 10 mL, 10 mL, Intravenous, PRN, Timothy Khanna, DO    Current Outpatient Medications:   •  acetaminophen (TYLENOL) 325 MG tablet, Take 2 tablets by mouth Every 6 (Six) Hours As Needed for Mild Pain., Disp: , Rfl:   •  apixaban (ELIQUIS) 5 MG tablet tablet, Take 1 tablet by mouth Every 12 (Twelve) Hours. Indications: DVT/PE (active thrombosis), Disp: 180 tablet, Rfl: 0  •  aspirin 81 MG chewable tablet, Chew 81 mg Daily., Disp: , Rfl:   •  atorvastatin (LIPITOR) 20 MG tablet, Take 1 tablet by mouth Every Night., Disp: 90 tablet, Rfl:   •  bumetanide (BUMEX) 1 MG tablet, Take 1 tablet by mouth Daily. (Patient taking differently: Take 2 tablets by mouth Daily.), Disp: , Rfl:   •  carvedilol (COREG) 3.125 MG tablet, Take 1 tablet by mouth 2 (Two) Times a Day With Meals., Disp: , Rfl:   •  Cholecalciferol (VITAMIN D3) 5000 units tablet tablet, Take 5,000 Units by mouth Daily., Disp: , Rfl:   •  Diclofenac Sodium (VOLTAREN) 1 % gel gel, Apply 2 g topically to the appropriate area as directed 4 (Four) Times a Day As Needed (pain)., Disp: , Rfl:   •  docusate sodium 100 MG capsule, Take 1 capsule by mouth 2 (Two) Times a Day., Disp: , Rfl:   •  famotidine (PEPCID) 20 MG tablet, Take 1 tablet by mouth Daily., Disp: , Rfl:   •  hydrALAZINE (APRESOLINE) 10 MG tablet, Take 1 tablet by mouth Every 8 (Eight) Hours., Disp: , Rfl:   •  insulin aspart (novoLOG FLEXPEN) 100 UNIT/ML solution pen-injector sc pen, Inject 3 Units under the skin into the appropriate area as directed Daily., Disp: , Rfl:   •  insulin detemir (LEVEMIR) 100  UNIT/ML injection, Inject 8 Units under the skin into the appropriate area as directed Every Night., Disp: , Rfl: 12  •  levothyroxine (SYNTHROID, LEVOTHROID) 75 MCG tablet, Take 1 tablet by mouth Every Morning., Disp: , Rfl:   •  magnesium oxide (MAG-OX) 400 MG tablet, Take 400 mg by mouth Daily., Disp: , Rfl:   •  MM Pen Needles 32G X 4 MM misc, 4 (Four) Times a Day., Disp: , Rfl:   •  nitroglycerin (NITROSTAT) 0.4 MG SL tablet, Place 1 tablet under the tongue Every 5 (Five) Minutes As Needed for Chest Pain. Take no more than 3 doses in 15 minutes., Disp: , Rfl: 12  •  nystatin (MYCOSTATIN) 139329 UNIT/GM powder, Apply  topically to the appropriate area as directed 3 (Three) Times a Day., Disp: 30 g, Rfl: 0  •  Probiotic Product (PROBIOTIC DAILY PO), Take  by mouth., Disp: , Rfl:   •  sacubitril-valsartan (Entresto) 24-26 MG tablet, Take 1 tablet by mouth 2 (Two) Times a Day., Disp: , Rfl:   •  sennosides-docusate (PERICOLACE) 8.6-50 MG per tablet, Take 1 tablet by mouth Daily. PRN, Disp: , Rfl:   •  tamsulosin (FLOMAX) 0.4 MG capsule 24 hr capsule, Take 1 capsule by mouth Daily., Disp: 30 capsule, Rfl: 0    Facility-Administered Medications Ordered in Other Encounters:   •  Chlorhexidine Gluconate Cloth 2 % pads 1 application, 1 application, Topical, Q12H PRN, Mohan Arauz PA    ALLERGIES     Vancomycin    FAMILY HISTORY       Family History   Problem Relation Age of Onset   • Coronary artery disease Mother    • Diabetes Mother    • Cancer Father           SOCIAL HISTORY       Social History     Socioeconomic History   • Marital status:    • Number of children: 2   Tobacco Use   • Smoking status: Never   • Smokeless tobacco: Never   Vaping Use   • Vaping Use: Never used   Substance and Sexual Activity   • Alcohol use: No   • Drug use: No   • Sexual activity: Defer         PHYSICAL EXAM    (up to 7 for level 4, 8 or more for level 5)   Physical Exam  Vitals and nursing note reviewed.    Constitutional:       General: He is not in acute distress.     Appearance: Normal appearance. He is well-developed. He is ill-appearing. He is not toxic-appearing.   HENT:      Head: Normocephalic and atraumatic.      Nose: Nose normal.      Mouth/Throat:      Mouth: Mucous membranes are moist.   Eyes:      Extraocular Movements: Extraocular movements intact.   Cardiovascular:      Rate and Rhythm: Normal rate and regular rhythm.      Pulses: Normal pulses.      Heart sounds: Normal heart sounds.   Pulmonary:      Effort: No respiratory distress.      Breath sounds: Normal breath sounds.   Chest:      Chest wall: No tenderness.   Abdominal:      General: There is no distension.      Palpations: Abdomen is soft.      Tenderness: There is abdominal tenderness.   Musculoskeletal:         General: No deformity. Normal range of motion.      Cervical back: Normal range of motion.      Right lower leg: Edema present.      Left lower leg: Edema present.   Skin:     General: Skin is warm and dry.   Neurological:      General: No focal deficit present.      Mental Status: He is alert.   Psychiatric:         Behavior: Behavior normal.         Thought Content: Thought content normal.         Judgment: Judgment normal.          DIAGNOSTIC RESULTS     EKG: All EKGs are interpreted by the Emergency Department Physician who either signs or Co-signs this chart in the absence of a cardiologist.    ECG 12 Lead ED Triage Standing Order; Chest Pain   Preliminary Result   Test Reason : ED Triage Standing Order~   Blood Pressure :   */*   mmHG   Vent. Rate :  66 BPM     Atrial Rate :  66 BPM      P-R Int : 200 ms          QRS Dur : 192 ms       QT Int : 512 ms       P-R-T Axes :  21 -71  97 degrees      QTc Int : 536 ms      Normal sinus rhythm   Left axis deviation   Nonspecific intraventricular block   Abnormal ECG   When compared with ECG of 14-NOV-2022 20:30, (Unconfirmed)   Sinus rhythm has replaced Electronic ventricular  pacemaker      Referred By: EDMD           Confirmed By:       ECG 12 Lead ED Triage Standing Order; Chest Pain   Preliminary Result   Test Reason : ED Triage Standing Order~   Blood Pressure :   */*   mmHG   Vent. Rate :  68 BPM     Atrial Rate :  68 BPM      P-R Int :   * ms          QRS Dur : 188 ms       QT Int : 492 ms       P-R-T Axes :  12 -52 110 degrees      QTc Int : 523 ms      Ventricular-paced rhythm   Abnormal ECG   When compared with ECG of 02-OCT-2022 23:34,   Vent. rate has decreased BY  23 BPM      Referred By: EDMD           Confirmed By:           RADIOLOGY:   Non-plain film images such as CT, Ultrasound and MRI are read by the radiologist. Plain radiographic images are visualized and preliminarily interpreted by the emergency physician with the below findings:      [x] Radiologist's Report Reviewed:  CT Chest Without Contrast Diagnostic   Final Result   1.  Small to moderate bilateral pleural effusions with mild adjacent   consolidation, likely atelectasis, but pneumonia is not excluded.   2.  Body wall edema and small amount of ascites which may indicate heart   failure/volume overload.   3.  Cardiomegaly. Calcific atherosclerosis.   4.  Cholelithiasis.       This report was finalized on 11/14/2022 11:13 PM by Eyad Wilburn MD.          XR Chest 1 View   Final Result   1.  Cardiomegaly.   2.  Small pleural effusions.   3.  Mild left basilar opacities which may represent atelectasis or   infiltrate.       This report was finalized on 11/14/2022 9:45 PM by Eyad Wilburn MD.                ED BEDSIDE ULTRASOUND:   Performed by ED Physician - none    LABS:    I have reviewed and interpreted all of the currently available lab results from this visit (if applicable):  Results for orders placed or performed during the hospital encounter of 11/14/22   Troponin    Specimen: Blood   Result Value Ref Range    Troponin T 0.074 (C) 0.000 - 0.030 ng/mL   Troponin    Specimen: Blood   Result Value Ref Range     Troponin T 0.076 (C) 0.000 - 0.030 ng/mL   Comprehensive Metabolic Panel    Specimen: Blood   Result Value Ref Range    Glucose 177 (H) 65 - 99 mg/dL    BUN 43 (H) 8 - 23 mg/dL    Creatinine 1.95 (H) 0.76 - 1.27 mg/dL    Sodium 142 136 - 145 mmol/L    Potassium 5.1 3.5 - 5.2 mmol/L    Chloride 103 98 - 107 mmol/L    CO2 29.0 22.0 - 29.0 mmol/L    Calcium 9.3 8.6 - 10.5 mg/dL    Total Protein 6.4 6.0 - 8.5 g/dL    Albumin 3.80 3.50 - 5.20 g/dL    ALT (SGPT) 55 (H) 1 - 41 U/L    AST (SGOT) 23 1 - 40 U/L    Alkaline Phosphatase 168 (H) 39 - 117 U/L    Total Bilirubin 0.3 0.0 - 1.2 mg/dL    Globulin 2.6 gm/dL    A/G Ratio 1.5 g/dL    BUN/Creatinine Ratio 22.1 7.0 - 25.0    Anion Gap 10.0 5.0 - 15.0 mmol/L    eGFR 34.8 (L) >60.0 mL/min/1.73   Lipase    Specimen: Blood   Result Value Ref Range    Lipase 10 (L) 13 - 60 U/L   BNP    Specimen: Blood   Result Value Ref Range    proBNP 26,381.0 (H) 0.0 - 1,800.0 pg/mL   CBC Auto Differential    Specimen: Blood   Result Value Ref Range    WBC 6.42 3.40 - 10.80 10*3/mm3    RBC 3.79 (L) 4.14 - 5.80 10*6/mm3    Hemoglobin 10.9 (L) 13.0 - 17.7 g/dL    Hematocrit 34.7 (L) 37.5 - 51.0 %    MCV 91.6 79.0 - 97.0 fL    MCH 28.8 26.6 - 33.0 pg    MCHC 31.4 (L) 31.5 - 35.7 g/dL    RDW 14.6 12.3 - 15.4 %    RDW-SD 48.9 37.0 - 54.0 fl    MPV 13.0 (H) 6.0 - 12.0 fL    Platelets 166 140 - 450 10*3/mm3    Neutrophil % 68.2 42.7 - 76.0 %    Lymphocyte % 17.4 (L) 19.6 - 45.3 %    Monocyte % 11.7 5.0 - 12.0 %    Eosinophil % 1.9 0.3 - 6.2 %    Basophil % 0.6 0.0 - 1.5 %    Immature Grans % 0.2 0.0 - 0.5 %    Neutrophils, Absolute 4.38 1.70 - 7.00 10*3/mm3    Lymphocytes, Absolute 1.12 0.70 - 3.10 10*3/mm3    Monocytes, Absolute 0.75 0.10 - 0.90 10*3/mm3    Eosinophils, Absolute 0.12 0.00 - 0.40 10*3/mm3    Basophils, Absolute 0.04 0.00 - 0.20 10*3/mm3    Immature Grans, Absolute 0.01 0.00 - 0.05 10*3/mm3    nRBC 0.0 0.0 - 0.2 /100 WBC   Lactic Acid, Plasma    Specimen: Blood   Result Value Ref  "Range    Lactate 1.3 0.5 - 2.0 mmol/L   ECG 12 Lead ED Triage Standing Order; Chest Pain   Result Value Ref Range    QT Interval 492 ms    QTC Interval 523 ms   ECG 12 Lead ED Triage Standing Order; Chest Pain   Result Value Ref Range    QT Interval 512 ms    QTC Interval 536 ms   Green Top (Gel)   Result Value Ref Range    Extra Tube Hold for add-ons.    Lavender Top   Result Value Ref Range    Extra Tube hold for add-on    Gold Top - SST   Result Value Ref Range    Extra Tube Hold for add-ons.    Gray Top   Result Value Ref Range    Extra Tube Hold for add-ons.    Light Blue Top   Result Value Ref Range    Extra Tube Hold for add-ons.         All other labs were within normal range or not returned as of this dictation.      EMERGENCY DEPARTMENT COURSE and DIFFERENTIAL DIAGNOSIS/MDM:   Vitals:    Vitals:    11/14/22 2023 11/14/22 2051 11/14/22 2100 11/14/22 2120   BP: (!) 165/103 146/95 150/89 124/77   BP Location: Left arm      Patient Position: Sitting      Pulse: 71 67  65   Resp: 18      Temp: 97.7 °F (36.5 °C)      TempSrc: Oral      SpO2: 98% 97% 96% 94%   Weight: 108 kg (238 lb)      Height: 190.5 cm (75\")          ED Course as of 11/15/22 0113   Tue Nov 15, 2022   0105 This patient presents with dyspnea, most likely secondary to exacerbation of CHF and volume overload. Also with complaints of chest pain. Differential diagnosis includes ACS, CHF, pneumonia or others. Presentation not consistent with acute respiratory etiologies to include acute PE (patient anticoagulated on Eliquis), pneumothorax , asthma, COPD exacerbation, allergic etiologies, or infectious etiologies such as PNA. Elevated troponin at baseline that continues to be elevated on presentation this evening. ProBNP elevated at 26,381.0. Serum Cr up from 1.6-->1.95. Chest imaging demonstrates  Small to moderate bilateral pleural effusions with mild adjacent  consolidation, likely atelectasis, but pneumonia is not excluded. There is also body wall " edema and small amount of ascites which may indicate heart failure/volume overload. This is consistent with clinical presentation. Patient given one dose of Bumex in ED. Hospitalist consulted for admission and agreeable to accept patient.  [JG]      ED Course User Index  [JG] Manny Navarrete PA     [facility]       MEDICATIONS ADMINISTERED IN ED:  Medications   sodium chloride 0.9 % flush 10 mL (has no administration in time range)   aspirin chewable tablet 324 mg (0 mg Oral Hold 11/14/22 2055)   bumetanide (BUMEX) injection 2 mg (2 mg Intravenous Given 11/14/22 2346)       PROCEDURES:  Procedures        HEART Score (for prediction of 6-week risk of major adverse cardiac event) reviewed and/or performed as part of the patient evaluation and treatment planning process.  The result associated with this review/performance is: 6       CRITICAL CARE TIME    Total Critical Care time was 0 minutes, excluding separately reportable procedures.   There was a high probability of clinically significant/life threatening deterioration in the patient's condition which required my urgent intervention.      FINAL IMPRESSION      1. Acute on chronic HFrEF (heart failure with reduced ejection fraction) ([facility])    2. Unstable angina (HCC)    3. Shortness of breath    4. Pleural effusion    5. History of chronic kidney disease          DISPOSITION/PLAN     ED Disposition     ED Disposition   Decision to Admit    Condition   --    Comment   Level of Care: Telemetry [5]   Diagnosis: Acute on chronic HFrEF (heart failure with reduced ejection fraction) ([facility]) [[id number]]   Admitting Physician: FRIDA DOAN [[id number]]   Attending Physician: FRIDA DOAN [[id number]]   Bed Request Comments: tele               Comment: Please note this report has been produced using speech recognition software.      RANULFO Uribe Jason C, PA  11/15/22 0113

## 2022-11-15 NOTE — PROGRESS NOTES
[facility] Medicine Services  ADMISSION FOLLOW-UP NOTE          Patient admitted after midnight, H&P by my partner performed earlier on today's date reviewed.  Interim findings, labs, and charting also reviewed.        [facility] Problem List has been managed and updated to include any new diagnoses:  Active Hospital Problems    Diagnosis  POA   • **Acute on chronic HFrEF (heart failure with reduced ejection fraction) ([facility]) [I50.23]  Yes   • Elevated serum creatinine [R79.89]  Yes   • Anemia, chronic disease [D63.8]  Yes   • Pleural effusion, bilateral [J90]  Yes   • PVD (peripheral vascular disease) ([facility]) [I73.9]  Yes   • Status post amputation of great toe, left ([facility]) [Z89.412]  Not Applicable   • Diabetic foot ulcer ([facility]) [E11.621, L97.509]  Yes   • Generalized weakness [R53.1]  Yes   • GERD without esophagitis [K21.9]  Yes   • CKD (chronic kidney disease) stage 3, GFR 30-59 ml/min ([facility]) [N18.30]  Yes   • Elevated troponin [R77.8]  Yes   • SSS (sick sinus syndrome) ([facility]) [I49.5]  Yes   • Type 2 diabetes mellitus ([facility]) [E11.9]  Yes   • Hypertension [I10]  Yes   • Hyperlipidemia [E78.5]  Yes   • Hypothyroidism (acquired) [E03.9]  Yes   • S/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 Au [Z95.1]  Not Applicable      Resolved Hospital Problems   No resolved problems to display.         ADDITIONAL PLAN:  - detailed assessment and plan from admission reviewed  - Jermaine Singh is a 77 y.o. male with a PMH significant for systolic CHF, CAD s/p CABG, HTN, HLD, SSS s/p pacemaker, history of DVT on Eliquis, CKD, diabetes mellitus type 2, hypothyroidism, PVD, chronic anemia, GERD, diabetic foot ulcers status post left great toe amputation 8/2022 who comes to the ED due to shortness of breath.  Patient was recently hospitalized at [facility] where he was treated for CHF exacerbation.  He was discharged to [facility] rehab.  Wife at bedside reports progressive weakness, lower extremity edema, chills,  chest pain, SOB.  Patient weighed 230 lbs at discharge from [facility] rehab, weight increased to 248 after d/c home.  Dr. Apodaca changed his diuretic regimen, he currently is weighing 240 pounds but feels like he is retaining fluid.    Today feels a little better.  Per nurse BP was a little low this AM so held hydralazine but did give bumex.     **Acute/chronic systolic CHF exacerbation   **Bilateral pleural effusions   **Elevated troponin   **CAD (s/p CABG)   **HTN, HLD   **SSS (s/p PPM)   -recent admission for same   -Cardiology/Dr. Apodaca following.  Recs IV bumex 2mg IV now and again in 8 hours with plan to transition to bumex 1mg PO BID starting on Wed 11/16.  Stopped hydralazine.  Recs continue entresto and coreg  -CT with bilateral pleural effusions and adjacent consolidation, likely atelectasis.  Pneumonia not excluded.  Monitor for signs of infection, low threshold to broaden antibiotic coverage  -proBNP >26,000  -troponin appears chronically elevated  -last ECHO 8/22 w/ EF 46-50%  -recent Lexiscan 10/3 that showed anteroseptal scar with no significant ischemia. LVEF 42%     **Diabetic foot ulcer (2nd toe left foot)   **S/p left great toe amputation (8/2/22)   **PVD   -pt follows w/ Dr. Márquez and Dr. Braga w/ ID; consult PRN   -on Doxycyline BID through February; continued   -CRP mildly elevated  -WOC consult      **Elevated creatinine   **CKD Stage III  -baseline CR ~1.6-1.7  -current CR 1.95 w/ eGFR 34.8  -monitor closely w/ IV diuresis   -avoid nephrotoxic meds   -consider Nephrology consult    -measure post void residual     **Generalized weakness  -pt,ot and case mgt consult   -speech consulted also as wife mentioned pt possibly choking intermittently??      **BLE DVTs (dx 8/22)   -continue Eliquis      **T2DM  -hem a1c 7.3  -holding routine novolog and Levemir (8 units)   -FSBG TID w/ LDSS     **Hypothyroidism   -increase levothyroxine from 75mcg daily to 88mcg daily     **GERD  -continue  Pepcid     **?Depression  --can consider starting SSRI if ok with patient     DVT prophylaxis:  Sofía Thomas MD  11/15/22

## 2022-11-15 NOTE — NURSING NOTE
WOC consult for left foot toes coccyx.  No LDAs charted.    WOC unable to see today due to high patient load.  We will attempt to see the patient tomorrow as time allows    Patient well-known to WOC, prior amputation left great toe..  Recommend paint this with Betadine and keep offloaded, if there is any drainage can place 1 layer of Xeroform and secure with rolled gauze.    Nurse endorses bilateral leg blisters and swelling.  Will consult PT wound care.    Nurse also spoke about left Gluteal fold open sore, asked if blanching, nurse not sure.    Keep heels elevated at all times on heel boots, apply waffle     mattress and turn every 2 hours with Z guard application.

## 2022-11-15 NOTE — CONSULTS
Cardiology Consult - [facility]    Jermaine JIMENES Francisco     N639/1  1945  [address]  [facility] [address]         Admission Date:  11/14/2022    Consultation Date:  11/15/22        PCP:  Mj Kennedy DO  Referring MD:  Dr. Hannon  Consulting MD:  Dr. 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 Au    SSS (sick sinus syndrome) (Formerly [facility])    Elevated troponin    Elevated serum creatinine    Anemia, chronic disease    Pleural effusion, bilateral    PVD (peripheral vascular disease) (Formerly [facility])    Status post amputation of great toe, left (Formerly [facility])    Diabetic foot ulcer (Formerly [facility])    Generalized weakness    GERD without esophagitis    CKD (chronic kidney disease) stage 3, GFR 30-59 ml/min (Formerly [facility])      Allergies:  is allergic to vancomycin.    Medications Prior to Admission   Medication Sig Dispense Refill Last Dose   • acetaminophen (TYLENOL) 325 MG tablet Take 2 tablets by mouth Every 6 (Six) Hours As Needed for Mild Pain.   11/14/2022   • apixaban (ELIQUIS) 5 MG tablet tablet Take 1 tablet by mouth Every 12 (Twelve) Hours. Indications: DVT/PE (active thrombosis) 180 tablet 0 11/14/2022   • aspirin 81 MG chewable tablet Chew 81 mg Daily.   11/14/2022   • atorvastatin (LIPITOR) 20 MG tablet Take 1 tablet by mouth Every Night. 90 tablet  Past Week   • bumetanide (BUMEX) 1 MG tablet Take 1 tablet by mouth Daily. (Patient taking differently: Take 2 tablets by mouth Daily.)   11/14/2022   • carvedilol (COREG) 3.125 MG tablet Take 1 tablet by mouth 2 (Two) Times a Day With Meals.   11/14/2022   • Cholecalciferol (VITAMIN D3) 5000 units tablet tablet Take 5,000 Units by mouth Daily.   11/14/2022   • Diclofenac Sodium (VOLTAREN) 1 % gel gel Apply 2 g  topically to the appropriate area as directed 4 (Four) Times a Day As Needed (pain).   Past Week   • docusate sodium 100 MG capsule Take 1 capsule by mouth 2 (Two) Times a Day.   Past Week   • doxycycline (VIBRAMYICN) 100 MG tablet Take 1 tablet by mouth 2 (Two) Times a Day.   11/14/2022   • famotidine (PEPCID) 20 MG tablet Take 1 tablet by mouth Daily. (Patient taking differently: Take 1 tablet by mouth Daily As Needed.)   11/14/2022   • insulin aspart (novoLOG FLEXPEN) 100 UNIT/ML solution pen-injector sc pen Inject 3 Units under the skin into the appropriate area as directed Daily.   11/14/2022   • insulin detemir (LEVEMIR) 100 UNIT/ML injection Inject 8 Units under the skin into the appropriate area as directed Every Night. (Patient taking differently: Inject 8 Units under the skin into the appropriate area as directed Daily.)  12 11/14/2022   • levothyroxine (SYNTHROID, LEVOTHROID) 75 MCG tablet Take 1 tablet by mouth Every Morning.   11/14/2022   • magnesium oxide (MAG-OX) 400 MG tablet Take 400 mg by mouth Daily.   11/14/2022   • nystatin (MYCOSTATIN) 200746 UNIT/GM powder Apply  topically to the appropriate area as directed 3 (Three) Times a Day. 30 g 0 11/14/2022   • Probiotic Product (PROBIOTIC DAILY PO) Take  by mouth Daily.   11/14/2022   • tamsulosin (FLOMAX) 0.4 MG capsule 24 hr capsule Take 1 capsule by mouth Daily. 30 capsule 0 11/14/2022   • hydrALAZINE (APRESOLINE) 10 MG tablet Take 1 tablet by mouth Every 8 (Eight) Hours.   More than a month   • MM Pen Needles 32G X 4 MM misc 4 (Four) Times a Day.      • nitroglycerin (NITROSTAT) 0.4 MG SL tablet Place 1 tablet under the tongue Every 5 (Five) Minutes As Needed for Chest Pain. Take no more than 3 doses in 15 minutes.  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  He was admitted 7/27-8/26/22 initially for the osteomyelitis & amputation.  Discharged to [facility] for rehab.  He returned to [facility] and admitted on 10/2/22 for acute CHF exacerbation and RLE cellulitis.  He was discharged again to [facility] for rehab on 10/11/2022.    He presents to [facility] ED last night with complaints of chest pain and SOA.  Pain started yesterday at rest. SOA has been present for several days - reports orthopnea and weight gain.  Cr slightly higher at 1.95, proBNP 23,706 which is close to his baseline since 8/2022.  He was admitted to hospitalist service, cardiology has been asked to follow in consultation.  At time of consultation, patient is resting in bed, awakens easily.  Flat affect, minimal conversation but answers questions appropriately.  Doesn't know what meds he's taking at home.  States he \"doesn't feel like doing a lot\" but also states he's trying to get around with his foot as much as he can.  Currently reports no chest pain.  Breathing is stable at rest - does not use O2 at home.              Social History     Socioeconomic History   • Marital status:    • Number of children: 2   Tobacco Use   • Smoking status: Never   • Smokeless tobacco: Never   Vaping Use   • Vaping Use: Never used   Substance and Sexual Activity   • Alcohol use: Not Currently     Comment: every 2-3 months   • Drug use: No   • Sexual activity: Defer     Family History   Problem Relation Age of Onset   • Coronary artery disease Mother    • Diabetes Mother    • Cancer Father      Past Surgical History:   Procedure Laterality Date   • APPENDECTOMY     • CARDIAC CATHETERIZATION N/A 2/14/2019    Procedure: Left Heart Cath;  Surgeon: Cooper Apodaca MD;  Location: [facility] CATH INVASIVE LOCATION;  Service: Cardiology   • CARDIAC ELECTROPHYSIOLOGY PROCEDURE N/A 5/18/2022    Procedure: DEVICE IMPLANT;  Surgeon: Cooper Apodaca MD;  " "Location:  [facility] CATH INVASIVE LOCATION;  Service: Cardiology;  Laterality: N/A;   • COLONOSCOPY     • CORONARY ARTERY BYPASS GRAFT N/A 3/22/2019    Procedure: MEDIAN STERNOTOMY, CORONARY ARTERY BYPASS GRAFT X3, UTILIZING THE LEFT INTERNAL MAMMARY ARTERY, EVH AND OPEN HARVEST OF THE RIGHT GREATER SAPHENOUS VEIN, EXPLORATION OF THE LEFT LEG;  Surgeon: Ap Au MD;  Location:  [facility] OR;  Service: Cardiothoracic   • EYE SURGERY Bilateral     cataracts    • INTERVENTIONAL RADIOLOGY PROCEDURE N/A 7/29/2021    Procedure: Abdominal Aortagram with Runoff;  Surgeon: Jermaine Hernandez MD;  Location:  [facility] CATH INVASIVE LOCATION;  Service: Cardiovascular;  Laterality: N/A;   • KNEE ARTHROSCOPY      right x 2, left x 1   • LACERATION REPAIR      right leg   • LEG SURGERY      2 for fracture of rt leg    • TONSILLECTOMY      Adnoidectomy   • TRANS METATARSAL AMPUTATION Left 8/2/2022    Procedure: GREAT TOE AMPUTATION LEFT;  Surgeon: Gopal Márquez MD;  Location:  [facility] OR;  Service: Vascular;  Laterality: Left;     ROS: Pertinent items are noted in HPI, all other systems reviewed and negative     Objective     height is 190.5 cm (75\") and weight is 110 kg (242 lb 14.4 oz). His oral temperature is 96.4 °F (35.8 °C). His blood pressure is 91/75 and his pulse is 60. His respiration is 16 and oxygen saturation is 93%.  No intake or output data in the 24 hours ending 11/15/22 1038         11/14/22  2023 11/15/22  0431   Weight: 108 kg (238 lb) 110 kg (242 lb 14.4 oz)          Physical Exam:  General Appearance:    Alert, cooperative, in no acute distress.  Appears stated age.   Head:    Normocephalic, without obvious abnormality, atraumatic   Eyes:            Lids and lashes normal, conjunctivae and sclerae normal, no   icterus, no pallor, corneas clear, PERRLA   Ears:    Ears appear intact with no abnormalities noted   Throat:   No oral lesions, no thrush, oral mucosa moist   Neck:   No adenopathy, supple, trachea " midline, no thyromegaly, no carotid bruit, no JVD   Back:     No kyphosis present, no scoliosis present, no skin lesions,    erythema or scars, no tenderness to percussion or                   palpation, range of motion normal   Lungs:     Clear to auscultation,respirations regular, even and               unlabored    Heart:    Regular rhythm and normal rate, normal S1 and S2, no         murmur, no gallop, no rub, no click   Abdomen:     Normal bowel sounds, no masses, no organomegaly, soft     nontender, nondistended, no guarding, no rebound   tenderness   Extremities:   Moves all extremities well,  no cyanosis, minimal redness, 1+ edema   Pulses:   Pulses palpable and equal bilaterally   Skin:   No bleeding, bruising or rash   Lymph nodes:   No palpable adenopathy   Neurologic:   Cranial nerves 2 - 12 grossly intact, sensation intact, DTR     present and equal bilaterally          Results Review:  I personally reviewed the patient's clinical results.  Results from last 7 days   Lab Units 11/15/22  0507   WBC 10*3/mm3 5.92   HEMOGLOBIN g/dL 10.5*   HEMATOCRIT % 33.3*   PLATELETS 10*3/mm3 154     Results from last 7 days   Lab Units 11/15/22  0507 11/14/22 2036   SODIUM mmol/L 141 142   POTASSIUM mmol/L 4.4 5.1   CHLORIDE mmol/L 104 103   CO2 mmol/L 25.0 29.0   BUN mg/dL 43* 43*   CREATININE mg/dL 1.70* 1.95*   CALCIUM mg/dL 9.0 9.3   BILIRUBIN mg/dL  --  0.3   ALK PHOS U/L  --  168*   ALT (SGPT) U/L  --  55*   AST (SGOT) U/L  --  23   GLUCOSE mg/dL 157* 177*         Results from last 7 days   Lab Units 11/15/22  0507   TSH uIU/mL 8.020*         Results from last 7 days   Lab Units 11/14/22 2036   PROBNP pg/mL 26,381.0*             Radiology:  Imaging Results (Last 72 Hours)     Procedure Component Value Units Date/Time    CT Chest Without Contrast Diagnostic [[id number]] Collected: 11/14/22 2309     Updated: 11/14/22 2316    Narrative:      DATE OF EXAM: 11/14/2022 10:24 PM     PROCEDURE: CT CHEST WO CONTRAST  DIAGNOSTIC-     INDICATIONS: Questionable infiltrates on chest x-ray, shortness of  breath     COMPARISON: Chest radiograph performed the same day. Chest CT  08/12/2022.     TECHNIQUE: Routine transaxial slices were obtained through the chest  without the administration of intravenous contrast. Reconstructed  coronal and sagittal images were also obtained. Automated exposure  control and iterative construction methods were used.     The radiation dose reduction device was turned on for each scan per the  ALARA (As Low as Reasonably Achievable) protocol.     FINDINGS:  There are small to moderate bilateral pleural effusions, similar to the  prior chest CT. No pneumothorax is seen. Respiratory motion somewhat  limits evaluation. There is mild consolidation adjacent to the  effusions. No other significant infiltrate is seen at this time. Central  airways appear patent. The nodules previously seen in the anterior right  base are not visualized on this exam.         The heart appears enlarged. Status post median sternotomy. Pacemaker is  present. No pericardial effusion. There is advanced calcific  atherosclerosis of the coronary arteries. No definite acute aortic  abnormality is seen. There is aortic atherosclerosis. There is a small  hiatal hernia. No significant lymphadenopathy is seen.     There is diffuse body wall edema. There is a small amount of ascites.  Calcified gallstones are noted.     Degenerative changes are present in the spine. No aggressive osseous  lesion is seen.       Impression:      1.  Small to moderate bilateral pleural effusions with mild adjacent  consolidation, likely atelectasis, but pneumonia is not excluded.  2.  Body wall edema and small amount of ascites which may indicate heart  failure/volume overload.  3.  Cardiomegaly. Calcific atherosclerosis.  4.  Cholelithiasis.     This report was finalized on 11/14/2022 11:13 PM by Eyad Wilburn MD.       XR Chest 1 View [[id number]] Collected:  11/14/22 2143     Updated: 11/14/22 2148    Narrative:      DATE OF EXAM:  11/14/2022 9:12 PM     PROCEDURE:  XR CHEST 1 VW-     INDICATIONS:  Chest Pain Triage Protocol     COMPARISON:  10/02/2022.     TECHNIQUE:   Single radiographic view of the chest was obtained.     FINDINGS:  There is suspicion for small pleural effusions. No pneumothorax. There  are mild left basilar opacities.  Status post median sternotomy and  CABG. The heart appears mildly enlarged. Pacemaker electrodes appear in  expected positions.  Previously seen right lung opacities have improved.       Impression:      1.  Cardiomegaly.  2.  Small pleural effusions.  3.  Mild left basilar opacities which may represent atelectasis or  infiltrate.     This report was finalized on 11/14/2022 9:45 PM by Eyad Wilburn MD.               Tele:  V Paced    ASSESSMENT:  -  Chronic Systolic Heart Failure with subacute decompensation. Echo 8/11/22 reviewed:  EF 46-50%, LV septal HK, no significant valvular dz.   -  CAD with previous 3V CABG 2/2019  -  Chronic non specific troponin elevation without rise/fall.  Nuclear MPS 10/3/22:  No isotope mismatch defects, no ischemia noted.  Study consistent with previous ant wall MI.  -  SSS with [facility]  -  PVDz s/p fairly recent Left great toe amputation  -  BLE DVT 8/2022 on NOAC  -  CKDz  -  HTN  -  Hypothyroidism with abnormal TSH  -  Depression      PLAN:  -  Overall, patient appears to be fairly well compensated.  His numbers (proBNP, Cr, troponin) are not far from baseline.  -  Continue with IV Bumex 2mg now and again in 8 hours.  Transition to 1mg Bumex PO BID starting Wednesday 11/16.  Recheck BMP, proBNP in a.m.  Continue strict I/Os, daily wts.  -  DC Hydralazine.   Continue Entresto, Continue Coreg with adjusted timing.  -  Patient appears clinically depressed, which has been a historic issue with multiple medical conditions/hospitalizations.  Will defer to primary service to address.  -  Continue  PT/OT.  Patient needs to be up out of bed, ambulating daily.            I discussed the patient's findings and my recommendations with the patient, any present family members, and the nursing staff.  Cooper Apodaca MD saw and examined patient, verified hx and PE, read all radiographic studies, reviewed labs and micro data, and formulated dx, plan for treatment and all medical decision making.      Megan Shukla PA-C, working with Cooper Apodaca MD  11/15/22 10:38 EST

## 2022-11-15 NOTE — CASE MANAGEMENT/SOCIAL WORK
Discharge Planning Assessment  [facility]     Patient Name: Jermaine Singh  MRN: [id number]  Today's Date: 11/15/2022    Admit Date: 11/14/2022    Plan: Home with possible HH services   Discharge Needs Assessment     Row Name 11/15/22 1338       Living Environment    People in Home spouse    Current Living Arrangements home    Primary Care Provided by self;spouse/significant other    Family Caregiver if Needed none       Resource/Environmental Concerns    Resource/Environmental Concerns none       Transition Planning    Patient/Family Anticipates Transition to home with family    Transportation Anticipated family or friend will provide       Discharge Needs Assessment    Equipment Currently Used at Home walker, rolling    Equipment Needed After Discharge none               Discharge Plan     Row Name 11/15/22 1338       Plan    Plan Home with possible HH services    Patient/Family in Agreement with Plan yes    Plan Comments Spoke with patient at bedside. Patient lives with wife in [place] [place] [facility]. Patient is independent with use of walker. Wife helps with some ADL's. Not current with HH services, has used [facility] in the pasted. Patient denies any other DME's needs at this time. PCP is Mj Kennedy. Patient discharge plan is home with home health with private car to transport. CM will follow for discharge needs.    Final Discharge Disposition Code 06 - home with home health care              Continued Care and Services - Admitted Since 11/14/2022    Coordination has not been started for this encounter.     Selected Continued Care - Prior Encounters Includes continued care and service providers with selected services from prior encounters from 8/16/2022 to 11/15/2022    Discharged on 10/11/2022 Admission date: 10/2/2022 - Discharge disposition: Rehab Facility or Unit (DC - External)    Destination     Service Provider Selected Services Address Phone Fax Patient Preferred    [facility]  [facility] [address] [address] [phone] --                Discharged on 8/26/2022 Admission date: 7/27/2022 - Discharge disposition: Rehab Facility or Unit (DC - External)    Destination     Service Provider Selected Services Address Phone Fax Patient Preferred    [facility] [address] [address] [phone] --                    Expected Discharge Date and Time     Expected Discharge Date Expected Discharge Time    Nov 17, 2022          Demographic Summary     Row Name 11/15/22 1335       General Information    Admission Type inpatient    Preferred Language English               Functional Status     Row Name 11/15/22 1337       Functional Status    Usual Activity Tolerance fair    Current Activity Tolerance fair       Functional Status, IADL    Medications assistive person  wife helps    Meal Preparation assistive person  Wifes helps    Housekeeping assistive person  Wife helps    Laundry assistive person  Wife helps    Shopping assistive person  Wife helps               Psychosocial    No documentation.                Abuse/Neglect    No documentation.                Legal    No documentation.                Substance Abuse    No documentation.                Patient Forms    No documentation.                   Yolanda Adams RN

## 2022-11-16 LAB
ANION GAP SERPL CALCULATED.3IONS-SCNC: 7 MMOL/L (ref 5–15)
BACTERIA SPEC AEROBE CULT: ABNORMAL
BUN SERPL-MCNC: 42 MG/DL (ref 8–23)
BUN/CREAT SERPL: 25 (ref 7–25)
CALCIUM SPEC-SCNC: 8.7 MG/DL (ref 8.6–10.5)
CHLORIDE SERPL-SCNC: 102 MMOL/L (ref 98–107)
CO2 SERPL-SCNC: 31 MMOL/L (ref 22–29)
CREAT SERPL-MCNC: 1.68 MG/DL (ref 0.76–1.27)
EGFRCR SERPLBLD CKD-EPI 2021: 41.6 ML/MIN/1.73
GLUCOSE BLDC GLUCOMTR-MCNC: 149 MG/DL (ref 70–130)
GLUCOSE BLDC GLUCOMTR-MCNC: 155 MG/DL (ref 70–130)
GLUCOSE BLDC GLUCOMTR-MCNC: 219 MG/DL (ref 70–130)
GLUCOSE SERPL-MCNC: 167 MG/DL (ref 65–99)
MAGNESIUM SERPL-MCNC: 1.9 MG/DL (ref 1.6–2.4)
NT-PROBNP SERPL-MCNC: ABNORMAL PG/ML (ref 0–1800)
PHOSPHATE SERPL-MCNC: 3.8 MG/DL (ref 2.5–4.5)
POTASSIUM SERPL-SCNC: 4.3 MMOL/L (ref 3.5–5.2)
SODIUM SERPL-SCNC: 140 MMOL/L (ref 136–145)

## 2022-11-16 PROCEDURE — 82962 GLUCOSE BLOOD TEST: CPT

## 2022-11-16 PROCEDURE — 29581 APPL MULTLAYER CMPRN SYS LEG: CPT

## 2022-11-16 PROCEDURE — 97597 DBRDMT OPN WND 1ST 20 CM/<: CPT

## 2022-11-16 PROCEDURE — 80048 BASIC METABOLIC PNL TOTAL CA: CPT | Performed by: PHYSICIAN ASSISTANT

## 2022-11-16 PROCEDURE — 83735 ASSAY OF MAGNESIUM: CPT | Performed by: INTERNAL MEDICINE

## 2022-11-16 PROCEDURE — 83880 ASSAY OF NATRIURETIC PEPTIDE: CPT | Performed by: PHYSICIAN ASSISTANT

## 2022-11-16 PROCEDURE — 99232 SBSQ HOSP IP/OBS MODERATE 35: CPT | Performed by: INTERNAL MEDICINE

## 2022-11-16 PROCEDURE — 63710000001 INSULIN LISPRO (HUMAN) PER 5 UNITS: Performed by: NURSE PRACTITIONER

## 2022-11-16 PROCEDURE — 97110 THERAPEUTIC EXERCISES: CPT

## 2022-11-16 PROCEDURE — 84100 ASSAY OF PHOSPHORUS: CPT | Performed by: INTERNAL MEDICINE

## 2022-11-16 PROCEDURE — 97164 PT RE-EVAL EST PLAN CARE: CPT

## 2022-11-16 RX ORDER — NYSTATIN 100000 [USP'U]/G
POWDER TOPICAL EVERY 12 HOURS SCHEDULED
Status: DISCONTINUED | OUTPATIENT
Start: 2022-11-16 | End: 2022-11-20 | Stop reason: HOSPADM

## 2022-11-16 RX ORDER — CITALOPRAM 20 MG/1
20 TABLET ORAL DAILY
Status: DISCONTINUED | OUTPATIENT
Start: 2022-11-16 | End: 2022-11-17

## 2022-11-16 RX ADMIN — APIXABAN 5 MG: 5 TABLET, FILM COATED ORAL at 20:18

## 2022-11-16 RX ADMIN — DOCUSATE SODIUM 100 MG: 100 CAPSULE, LIQUID FILLED ORAL at 20:18

## 2022-11-16 RX ADMIN — BUMETANIDE 1 MG: 1 TABLET ORAL at 20:18

## 2022-11-16 RX ADMIN — APIXABAN 5 MG: 5 TABLET, FILM COATED ORAL at 08:20

## 2022-11-16 RX ADMIN — NYSTATIN: 100000 POWDER TOPICAL at 12:05

## 2022-11-16 RX ADMIN — BUMETANIDE 1 MG: 1 TABLET ORAL at 12:45

## 2022-11-16 RX ADMIN — FAMOTIDINE 20 MG: 20 TABLET ORAL at 08:24

## 2022-11-16 RX ADMIN — CARVEDILOL 3.12 MG: 3.12 TABLET, FILM COATED ORAL at 12:46

## 2022-11-16 RX ADMIN — TAMSULOSIN HYDROCHLORIDE 0.4 MG: 0.4 CAPSULE ORAL at 08:24

## 2022-11-16 RX ADMIN — INSULIN LISPRO 2 UNITS: 100 INJECTION, SOLUTION INTRAVENOUS; SUBCUTANEOUS at 08:17

## 2022-11-16 RX ADMIN — HYDROXYZINE HYDROCHLORIDE 10 MG: 10 TABLET ORAL at 13:47

## 2022-11-16 RX ADMIN — DOCUSATE SODIUM 100 MG: 100 CAPSULE, LIQUID FILLED ORAL at 08:26

## 2022-11-16 RX ADMIN — LEVOTHYROXINE SODIUM 88 MCG: 88 TABLET ORAL at 06:19

## 2022-11-16 RX ADMIN — ACETAMINOPHEN 650 MG: 325 TABLET, FILM COATED ORAL at 12:12

## 2022-11-16 RX ADMIN — ASPIRIN 81 MG CHEWABLE TABLET 81 MG: 81 TABLET CHEWABLE at 08:23

## 2022-11-16 RX ADMIN — DOXYCYCLINE 100 MG: 100 CAPSULE ORAL at 18:18

## 2022-11-16 RX ADMIN — SACUBITRIL AND VALSARTAN 1 TABLET: 24; 26 TABLET, FILM COATED ORAL at 08:20

## 2022-11-16 RX ADMIN — DOXYCYCLINE 100 MG: 100 CAPSULE ORAL at 06:19

## 2022-11-16 RX ADMIN — SACUBITRIL AND VALSARTAN 1 TABLET: 24; 26 TABLET, FILM COATED ORAL at 20:17

## 2022-11-16 RX ADMIN — Medication 1 CAPSULE: at 08:20

## 2022-11-16 RX ADMIN — ATORVASTATIN CALCIUM 20 MG: 20 TABLET, FILM COATED ORAL at 20:18

## 2022-11-16 RX ADMIN — SENNOSIDES AND DOCUSATE SODIUM 1 TABLET: 50; 8.6 TABLET ORAL at 08:24

## 2022-11-16 RX ADMIN — CARVEDILOL 3.12 MG: 3.12 TABLET, FILM COATED ORAL at 18:18

## 2022-11-16 RX ADMIN — Medication 10 ML: at 08:24

## 2022-11-16 RX ADMIN — INSULIN LISPRO 3 UNITS: 100 INJECTION, SOLUTION INTRAVENOUS; SUBCUTANEOUS at 17:38

## 2022-11-16 NOTE — PROGRESS NOTES
" [facility] - Progress Note    Jermaine Singh  1945  N639/1    11/16/22, 08:49 EST      Chief Complaint: Following for chronic HFrEF    Subjective:   Sitting up in chair eating breakfast - breathing is better.  He's quite pleased with weight loss since admit (12#) and overall feels better.  Appetite is good.  Slept well.    Reports that his issue once discharged from [facility] was that his therapy was not continued.  He wants to continue therapy and states he needs someone to push him or he \"gets down and wont do it.\"         Review of Systems:  Pertinent positives are listed above and in physical exam.  All others have been reviewed and are negative.    apixaban, 5 mg, Oral, Q12H  aspirin, 324 mg, Oral, Once  aspirin, 81 mg, Oral, Daily  atorvastatin, 20 mg, Oral, Nightly  bumetanide, 1 mg, Oral, BID  carvedilol, 3.125 mg, Oral, BID With Meals  docusate sodium, 100 mg, Oral, BID  doxycycline, 100 mg, Oral, Q12H  famotidine, 20 mg, Oral, Daily  insulin lispro, 0-7 Units, Subcutaneous, TID AC  lactobacillus acidophilus, 1 capsule, Oral, Daily  levothyroxine, 88 mcg, Oral, Q AM  pharmacy consult - MTM, , Does not apply, Daily  sacubitril-valsartan, 1 tablet, Oral, BID  sennosides-docusate, 1 tablet, Oral, Daily  sodium chloride, 10 mL, Intravenous, Q12H  tamsulosin, 0.4 mg, Oral, Daily        Objective:  Vitals:   height is 190.5 cm (75\") and weight is 104 kg (228 lb 14.4 oz). His oral temperature is 96.5 °F (35.8 °C). His blood pressure is 99/71 and his pulse is 68. His respiration is 18 and oxygen saturation is 98%.       Intake/Output Summary (Last 24 hours) at 11/16/2022 0827  Last data filed at 11/15/2022 2337  Gross per 24 hour   Intake --   Output 1300 ml   Net -1300 ml       Physical Exam:  General:  WN, NAD, A and O x3.  CV:  Normal S1,S2. No murmur, rub, or gallop.  Resp:  CTA Soy, equal, nonlabored.  Abd:  Soft, + BS, no organomegaly. Nontender to palpation.  Extrem:  1+ edema BLE, 2+ " pedal/PT pulses.            Results from last 7 days   Lab Units 11/15/22  0507   WBC 10*3/mm3 5.92   HEMOGLOBIN g/dL 10.5*   HEMATOCRIT % 33.3*   PLATELETS 10*3/mm3 154     Results from last 7 days   Lab Units 11/16/22  0423 11/15/22  0507 11/14/22  2036   SODIUM mmol/L 140   < > 142   POTASSIUM mmol/L 4.3   < > 5.1   CHLORIDE mmol/L 102   < > 103   CO2 mmol/L 31.0*   < > 29.0   BUN mg/dL 42*   < > 43*   CREATININE mg/dL 1.68*   < > 1.95*   CALCIUM mg/dL 8.7   < > 9.3   BILIRUBIN mg/dL  --   --  0.3   ALK PHOS U/L  --   --  168*   ALT (SGPT) U/L  --   --  55*   AST (SGOT) U/L  --   --  23   GLUCOSE mg/dL 167*   < > 177*    < > = values in this interval not displayed.         Results from last 7 days   Lab Units 11/15/22  0507   TSH uIU/mL 8.020*         Results from last 7 days   Lab Units 11/16/22  0423   PROBNP pg/mL 23,101.0*       Tele:  NSR    ASSESSMENT:  -  Chronic Systolic Heart Failure with subacute decompensation. Echo 8/11/22 reviewed:  EF 46-50%, LV septal HK, no significant valvular dz.   -  CAD with previous 3V CABG 2/2019  -  Chronic non specific troponin elevation without rise/fall.  Nuclear MPS 10/3/22:  No isotope mismatch defects, no ischemia noted.  Study consistent with previous ant wall MI.  -  SSS with [facility]  -  PVDz s/p fairly recent Left great toe amputation  -  BLE DVT 8/2022 on NOAC  -  CKDz  -  HTN  -  Hypothyroidism with abnormal TSH  -  Depression        PLAN:  -  Overall, patient appears to be fairly well compensated.  His numbers (proBNP, Cr, troponin) are not far from baseline.  -   Transition to 1mg Bumex PO BID starting today.  Continue strict I/Os, daily wts.  Repeat proBNP and BMP reviewed.  -  DC'd Hydralazine.   Continue Entresto, Continue Coreg with adjusted timing.  -  Patient appears clinically depressed, which has been a historic issue with multiple medical conditions/hospitalizations.  Will defer to primary service to address.  -  Continue PT/OT.  Patient needs to  be up out of bed, ambulating daily.  Will share patient concerns with Case Management for discharge needs.  -  Cardiology will continue to follow.        I discussed the patient's findings and my recommendations with the patient, any present family members, and the nursing staff.  Cooper Apodaca MD saw and examined patient, verified hx and PE, read all radiographic studies, reviewed labs and micro data, and formulated dx, plan for treatment and all medical decision making.      Megan Shukla PA-C  11/16/22, 08:49 EST

## 2022-11-16 NOTE — THERAPY TREATMENT NOTE
Patient Name: Jermaine Singh  : 1945    MRN: [id number]                              Today's Date: 2022       Admit Date: 2022    Visit Dx:     ICD-10-CM ICD-9-CM   1. Acute on chronic HFrEF (heart failure with reduced ejection fraction) (Formerly [facility])  I50.23 428.23   2. Unstable angina (Formerly [facility])  I20.0 411.1   3. Shortness of breath  R06.02 786.05   4. Pleural effusion  J90 511.9   5. 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  Preserved LV function   • Type 2 diabetes mellitus (Formerly [facility])   • Hypertension   • Hyperlipidemia   • Hypothyroidism (acquired)   • Vitamin D deficiency on Rx    • Diabetic neuropathy   • RBBB   • S/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 Au   • Atherosclerosis of native artery of both lower extremities with intermittent claudication ([facility])   • SSS (sick sinus syndrome) (Formerly [facility])   • Subacute osteomyelitis of left foot (Formerly [facility])   • Acute on chronic HFrEF (heart failure with reduced ejection fraction) (Former[facility])   • DVT, bilateral lower limbs ([facility]ly [facility])   • Elevated troponin   • Cellulitis of right lower extremity   • Obesity (BMI 30.0-34.9)   • Elevated serum creatinine   • Anemia, chronic disease   • Pleural effusion, bilateral   • PVD (peripheral vascular disease) ([facility])   • Status post amputation of great toe, left (Formerly [facility])   • Diabetic foot ulcer ([facility]ly [facility])   • Generalized weakness   • GERD without esophagitis   • CKD (chronic kidney disease) stage 3, GFR 30-59 ml/min ([facility])     Past Medical History:   Diagnosis Date   • Allergic    • Arthritis    • Asthma    • Coronary artery disease    • Diabetes mellitus ([facility])     started on [place] 2018; started on po meds in ; checking blood sugars daily    • Disease of thyroid gland     po meds daily for hypothyroidism    • History of fracture as a child     rt leg- severe    • Hyperlipidemia    • Hypertension    • Hypothyroidism    • Peripheral  neuropathy    • Peripheral vascular disease (HCC)     s/p angiogram 2/19-needs stent in left leg    • Proximal phalanx fracture of the second digit extending into the second metatarsal joint 7/28/2022   • RBBB    • Right knee pain    • Vitamin D deficiency      Past Surgical History:   Procedure Laterality Date   • APPENDECTOMY     • CARDIAC CATHETERIZATION N/A 2/14/2019    Procedure: Left Heart Cath;  Surgeon: Cooper Apodaca MD;  Location:  [facility] CATH INVASIVE LOCATION;  Service: Cardiology   • CARDIAC ELECTROPHYSIOLOGY PROCEDURE N/A 5/18/2022    Procedure: DEVICE IMPLANT;  Surgeon: Cooper Apodaca MD;  Location: [facility] CATH INVASIVE LOCATION;  Service: Cardiology;  Laterality: N/A;   • COLONOSCOPY     • CORONARY ARTERY BYPASS GRAFT N/A 3/22/2019    Procedure: MEDIAN STERNOTOMY, CORONARY ARTERY BYPASS GRAFT X3, UTILIZING THE LEFT INTERNAL MAMMARY ARTERY, EVH AND OPEN HARVEST OF THE RIGHT GREATER SAPHENOUS VEIN, EXPLORATION OF THE LEFT LEG;  Surgeon: Ap Au MD;  Location:  [facility];  Service: Cardiothoracic   • EYE SURGERY Bilateral     cataracts    • INTERVENTIONAL RADIOLOGY PROCEDURE N/A 7/29/2021    Procedure: Abdominal Aortagram with Runoff;  Surgeon: Jermaine Hernandez MD;  Location:  [facility] CATH INVASIVE LOCATION;  Service: Cardiovascular;  Laterality: N/A;   • KNEE ARTHROSCOPY      right x 2, left x 1   • LACERATION REPAIR      right leg   • LEG SURGERY      2 for fracture of rt leg    • TONSILLECTOMY      Adnoidectomy   • TRANS METATARSAL AMPUTATION Left 8/2/2022    Procedure: GREAT TOE AMPUTATION LEFT;  Surgeon: Gopal Márquez MD;  Location:  [facility];  Service: Vascular;  Laterality: Left;      General Information     Row Name 11/16/22 1442          OT Time and Intention    Document Type therapy note (daily note)  -ROSELIA     Mode of Treatment individual therapy;occupational therapy  -ROSELIA     Row Name 11/16/22 144          General Information    Patient Profile Reviewed yes  -ROSELIA      Existing Precautions/Restrictions weight bearing  RLE FWB per pt. with offloading shoe, L toe ampuation 8/22  -ROSELIA     Barriers to Rehab previous functional deficit  -ROSELIA     Row Name 11/16/22 1442          Cognition    Orientation Status (Cognition) oriented to;person  -     Row Name 11/16/22 1442          Safety Issues, Functional Mobility    Impairments Affecting Function (Mobility) balance;endurance/activity tolerance;coordination;strength;range of motion (ROM);sensation/sensory awareness  -ROSELIA           User Key  (r) = Recorded By, (t) = Taken By, (c) = Cosigned By    Initials Name Provider Type    Renae Morales, OT Occupational Therapist               Lymphedema     Row Name 11/16/22 1000             Lymphedema Edema Assessment    Ptting Edema Category By severity  -AB      Pitting Edema Mild  -AB      Recorded by [AB] Macrina Asher, PT              Skin Changes/Observations    Location/Assessment Lower Extremity  -AB      Lower Extremity Conditions bilateral:;dry;scab(s)  -AB      Lower Extremity Color/Pigment bilateral:;normal  -AB      Recorded by [AB] Macrina Asher, PT              Lymphedema Pulses/Capillary Refill    Lymphedema Pulses/Capillary Refill lower extremity pulses;capillary refill  -AB      Dorsalis Pedis Pulse right:;left:;+1 diminished  Edema present  -AB      Posterior Tibialis Pulse right:;left:;+1 diminished  Edema present  -AB      Capillary Refill lower extremity capillary refill  -AB      Lower Extremity Capillary Refill right:;left:;less than 3 seconds  -AB      Recorded by [AB] Macrina Asher, PT              Lymphedema Measurements    Measurement Type(s) Quick Girth  -AB      Quick Girth Areas Lower extremities  -AB      Recorded by [AB] Macrina Asher, PT              LLE Quick Girth (cm)    Mid foot 25 cm  -AB      Smallest ankle 23.5 cm  -AB      Largest calf 36.6 cm  -AB      Recorded by [AB] Macrina Asher, PT              RLE Quick Girth (cm)    Mid foot 26 cm  -AB       Smallest ankle 24.1 cm  -AB      Largest calf 39.4 cm  -AB      RLE Quick Girth Total 89.5  -AB      Recorded by [AB] Macrina Asher, PT              Compression/Skin Care    Compression/Skin Care skin care;wrapping location  -AB      Skin Care washed/dried;lotion applied  -AB      Wrapping Location lower extremity  -AB      Wrapping Location LE bilateral:;foot to knee  -AB      Wrapping Comments Compressogrips size 4/5 doubled and overlapping for gradient compression.  -AB      Recorded by [AB] Macrina Asher, PT            User Key  (r) = Recorded By, (t) = Taken By, (c) = Cosigned By    Initials Name Effective Dates    AB Macrina Asher, PT 09/22/22 -                Mobility/ADL's     Row Name 11/16/22 1444          Bed Mobility    Comment, (Bed Mobility) [facility]IC on arrival  -ROSELIA     Row Name 11/16/22 1444          Transfers    Comment, (Transfers) per pt. with recent anxiety medicine and reported it makes him to dizzy to attempt any out of chair mvmt. so agree only to in chair TE with encouragement  -ROSELIA     Row Name 11/16/22 1440          Activities of Daily Living    BADL Assessment/Intervention --  pt. declined  -ROSELIA           User Key  (r) = Recorded By, (t) = Taken By, (c) = Cosigned By    Initials Name Provider Type    Renae Morales, OT Occupational Therapist               Obj/Interventions     Row Name 11/16/22 9859          Shoulder (Therapeutic Exercise)    Shoulder (Therapeutic Exercise) AROM (active range of motion);AAROM (active assistive range of motion)  -ROSELIA     Shoulder AROM (Therapeutic Exercise) right;flexion;extension;aBduction;aDduction;horizontal aBduction/aDduction;10 repetitions;sitting  all UE and LE TE to support BADL independence and related transfers  -ROSELIA     Shoulder AAROM (Therapeutic Exercise) left;flexion;extension;aBduction;aDduction;10 repetitions;sitting  -ROSELIA     Row Name 11/16/22 3870          Elbow/Forearm (Therapeutic Exercise)    Elbow/Forearm (Therapeutic Exercise)  strengthening exercise  -ROSELIA     Elbow/Forearm Strengthening (Therapeutic Exercise) bilateral;flexion;extension;sitting;15 repititions  mild to moderate manual resistance  -     Row Name 11/16/22 1445          Motor Skills    Therapeutic Exercise shoulder;elbow/forearm  B hip, knee and ankle F/E AROM/AAROM/abd/add 10 reps also 15 gluteal sets with knee press  -ROSELIA     Row Name 11/16/22 1445          Balance    Static Sitting Balance independent  -ROSELIA     Dynamic Sitting Balance independent  -ROSELIA     Position, Sitting Balance unsupported;sitting in chair  -ROSELIA     Comment, Balance UE/LE TE  -ROSELIA           User Key  (r) = Recorded By, (t) = Taken By, (c) = Cosigned By    Initials Name Provider Type    Renae Morales OT Occupational Therapist               Goals/Plan     [facility] Name 11/16/22 1450          Bed Mobility Goal 1 (OT)    Progress/Outcomes (Bed Mobility Goal 1, OT) goal ongoing  -ROSELIA     Row Name 11/16/22 1450          Transfer Goal 1 (OT)    Progress/Outcome (Transfer Goal 1, OT) goal ongoing  -ROSELIA     Row Name 11/16/22 1450          Toileting Goal 1 (OT)    Progress/Outcome (Toileting Goal 1, OT) goal ongoing  -ROSELIA     Row Name 11/16/22 1450          Grooming Goal 1 (OT)    Progress/Outcome (Grooming Goal 1, OT) goal ongoing  -ROSELIA     Row Name 11/16/22 1450          Problem Specific Goal 1 (OT)    Progress/Outcome (Problem Specific Goal 1, OT) continuing progress toward goal  -ROSELIA           User Key  (r) = Recorded By, (t) = Taken By, (c) = Cosigned By    Initials Name Provider Type    Renae Morales, OT Occupational Therapist               Clinical Impression     Row Name 11/16/22 1448          Pain Assessment    Pretreatment Pain Rating 0/10 - no pain  -ROSELIA     Posttreatment Pain Rating 0/10 - no pain  -     Row Name 11/16/22 1448          Plan of Care Review    Plan of Care Reviewed With patient;spouse  -ROSELIA     Progress no change  -ROSELIA     Outcome Evaluation Pt./spouse reporting pt. just had anxiety  medicine that makes him dizziny and sleepy.  Pt. with encouragement agreed to chair TE to support BADL independence.  Pt. completed 10-15 reps each BUE AAROM/AROM/resistive TE.  Continue OT POC as pt. tolerates.  -ROSELIA     Row Name 11/16/22 1448          Therapy Assessment/Plan (OT)    Therapy Frequency (OT) daily  -ROSELIA     Row Name 11/16/22 1448          Therapy Plan Review/Discharge Plan (OT)    Anticipated Discharge Disposition (OT) inpatient rehabilitation facility  -ROSELIA     Row Name 11/16/22 1448          Vital Signs    Pre Systolic BP Rehab 129  -ROSELIA     Pre Treatment Diastolic BP 78  -ROSELIA     Pretreatment Heart Rate (beats/min) 61  -ROSELIA     Posttreatment Heart Rate (beats/min) 60  -ROSELIA     Pre SpO2 (%) 95  -ROSELIA     O2 Delivery Pre Treatment room air  -ROSELIA     O2 Delivery Intra Treatment room air  -ROSELIA     Post SpO2 (%) 94  -ROSELIA     O2 Delivery Post Treatment room air  -ROSELIA     Pre Patient Position Sitting  -ROSELIA     Intra Patient Position Sitting  -ROSELIA     Post Patient Position Sitting  -ROSELIA     Row Name 11/16/22 1448          Positioning and Restraints    Pre-Treatment Position sitting in chair/recliner  -ROSELIA     Post Treatment Position chair  -ROSELIA     In Chair reclined;call light within reach;encouraged to call for assist;waffle cushion;with family/caregiver  did not change alarm setting  -ROSELIA           User Key  (r) = Recorded By, (t) = Taken By, (c) = Cosigned By    Initials Name Provider Type    Renae Morales, OT Occupational Therapist               Outcome Measures     Row Name 11/16/22 5376          How much help from another is currently needed...    Putting on and taking off regular lower body clothing? 2  -ROSELIA     Bathing (including washing, rinsing, and drying) 2  -ROSELIA     Toileting (which includes using toilet bed pan or urinal) 3  -ROSELIA     Putting on and taking off regular upper body clothing 3  -ROSELIA     Taking care of personal grooming (such as brushing teeth) 4  post setup sitting  -ROSELIA     Eating meals 4  -ROSLEIA      AM-PAC 6 Clicks Score (OT) 18  -ROSELIA           User Key  (r) = Recorded By, (t) = Taken By, (c) = Cosigned By    Initials Name Provider Type    ROSELIA Renae Rosa OT Occupational Therapist                Occupational Therapy Education     Title: PT OT SLP Therapies (In Progress)     Topic: Occupational Therapy (In Progress)     Point: ADL training (Done)     Description:   Instruct learner(s) on proper safety adaptation and remediation techniques during self care or transfers.   Instruct in proper use of assistive devices.              Learning Progress Summary           Patient Acceptance, E, VU by ROSELIA at 11/15/2022 1156    Comment: reason for consult, benefit of therapy                   Point: Home exercise program (Done)     Description:   Instruct learner(s) on appropriate technique for monitoring, assisting and/or progressing therapeutic exercises/activities.              Learning Progress Summary           Patient Acceptance, E,D, VU,NR by  at 11/16/2022 1451    Comment: UE and LE TE to support BADL independence and related transfers   Family Acceptance, E,D, VU,NR by  at 11/16/2022 1451    Comment: UE and LE TE to support BADL independence and related transfers                   Point: Precautions (Not Started)     Description:   Instruct learner(s) on prescribed precautions during self-care and functional transfers.              Learner Progress:  Not documented in this visit.          Point: Body mechanics (Not Started)     Description:   Instruct learner(s) on proper positioning and spine alignment during self-care, functional mobility activities and/or exercises.              Learner Progress:  Not documented in this visit.                      User Key     Initials Effective Dates Name Provider Type [facility] 06/16/21 -  Renae Rosa, OT Occupational Therapist OT              OT Recommendation and Plan  Planned Therapy Interventions (OT): activity tolerance training, BADL retraining,  functional balance retraining, occupation/activity based interventions, ROM/therapeutic exercise, strengthening exercise, patient/caregiver education/training, transfer/mobility retraining  Therapy Frequency (OT): daily  Plan of Care Review  Plan of Care Reviewed With: patient, spouse  Progress: no change  Outcome Evaluation: Pt./spouse reporting pt. just had anxiety medicine that makes him dizziny and sleepy.  Pt. with encouragement agreed to chair TE to support BADL independence.  Pt. completed 10-15 reps each BUE AAROM/AROM/resistive TE.  Continue OT POC as pt. tolerates.     Time Calculation:    Time Calculation- OT     Row Name 11/16/22 1452             Time Calculation- OT    OT Start Time 1422  -ROSELIA      OT Received On 11/16/22  -ROSELIA      OT Goal Re-Cert Due Date 11/25/22  -ROSELIA         Timed Charges    09577 - OT Therapeutic Exercise Minutes 18  -ROSELIA         Total Minutes    Timed Charges Total Minutes 18  -ROSELIA       Total Minutes 18  -ROSELIA            User Key  (r) = Recorded By, (t) = Taken By, (c) = Cosigned By    Initials Name Provider Type    Renae Morales OT Occupational Therapist              Therapy Charges for Today     Code Description Service Date Service Provider Modifiers Qty    02187721964  OT EVAL LOW COMPLEXITY 4 11/15/2022 Renae Rosa OT GO 1    [id number]  OT THER PROC EA 15 MIN 11/16/2022 Renae Rosa OT GO 1               Renae Rosa OT  11/16/2022

## 2022-11-16 NOTE — PLAN OF CARE
Goal Outcome Evaluation:  Plan of Care Reviewed With: patient        Progress: no change  Outcome Evaluation: PT initial wound care eval completed. Pt presents with hx of L great toe amputation in Aug and recent onset of BLE swelling. Mild swelling noted B with R>L and scattered, no longer intact blisters that are dry/crusted. Pt reports swelling has improved greatly over last 24 hrs. PT was able to debride small amounts of nonviable/crusted skin from BLEs. Light MLWs applied in order to promote venous return and improve skin integrity. Pt will continue to benefit from skilled IPPT wound care. Anticipated wrap change in 2-3 days.

## 2022-11-16 NOTE — PLAN OF CARE
Goal Outcome Evaluation:  Plan of Care Reviewed With: patient, spouse        Progress: no change  Outcome Evaluation: Pt./spouse reporting pt. just had anxiety medicine that makes him dizziny and sleepy.  Pt. with encouragement agreed to chair TE to support BADL independence.  Pt. completed 10-15 reps each BUE AAROM/AROM/resistive TE.  Continue OT POC as pt. tolerates.

## 2022-11-16 NOTE — CASE MANAGEMENT/SOCIAL WORK
Continued Stay Note   [facility]     Patient Name: Jermaine Singh  MRN: [id number]  Today's Date: 11/16/2022    Admit Date: 11/14/2022    Plan: Home with Outpatient services   Discharge Plan     Row Name 11/16/22 1417       Plan    Plan Home with Outpatient services    Patient/Family in Agreement with Plan yes    Plan Comments Spoke with patient and wife at bedside. We discussed rehab option. Patient wanted to do cardiac rehab, however patient doesn't qualify for cardiac rehab. They decided on home health. They are current with [facility] health right now. They wanted a list of different home health service in [facility]. List given and will follow up. Patient discharge plan is home with home health for nursing, OT and PT. CM will follow for discharge needs.    Final Discharge Disposition Code 06 - home with home health care               Discharge Codes    No documentation.               Expected Discharge Date and Time     Expected Discharge Date Expected Discharge Time    Nov 17, 2022             Yolanda Adams RN

## 2022-11-16 NOTE — NURSING NOTE
Follow-up to WOC consult from yesterday for left trochanter, right lower leg skin tear.      PT wound care was consulted for lower extremity swelling and have applied wraps.    History of great toe amputation-mostly approximated, yellow dry scab at wound bed.-Paint with Betadine and apply silicone foam dressing to pad    Left gluteal fold with small superficial skin tear that is blanchable and smaller than a dime.  Z guard applied.    Left anterior knee abrasion status post fall-large dry crusted scab, SilvaSorb ointment applied and silicone foam dressing applied over--nursing to continue to do this every 3 days.    Sensory Perception: 3-->slightly limited  Moisture: 3-->occasionally moist  Activity: 3-->walks occasionally  Mobility: 3-->slightly limited  Nutrition: 3-->adequate  Friction and Shear: 3-->no apparent problem  Shahriar Score: 18 (11/15/22 1944)    Pressure Injury Prevention Protocol (initiate for Shahriar Score of 18 or less):   *Keep skin dry, turn q 2 hr, keep heels elevated and offloaded with offloading heel boots.    *Apply z-guard to bottom and bony prominences daily and as needed with incontinence episodes.  *Follow C.A.R.E protocol if medical devices (Bipap, medel, Ng tube, etc) are being used.     Heels are intact, wounds are placed.  WOC will sign off, please reconsult as further needs arise.

## 2022-11-16 NOTE — PLAN OF CARE
Goal Outcome Evaluation:         Increased to 2 L NC.  Tolerated well. V-paced.  Slept most of the night.

## 2022-11-16 NOTE — PROGRESS NOTES
"    [facility] Medicine Services  PROGRESS NOTE    Patient Name: Jermaine Singh  : 1945  MRN: [id number]    Date of Admission: 2022  Primary Care Physician: Mj Kennedy DO    Subjective   Subjective     CC:  SOA    HPI:  Feeling better, \"7 pounds lighter.\"  States that he is depressed because can't do the thing that he would like to do anymore.  Denies SI/HI or plan.  Would like to start an SSRI.   Note nurse held coreg and bumex this AM d/t borderline BP.     ROS:  Gen- No fevers, chills  CV- No chest pain, palpitations  Resp- No cough, +dyspnea  GI- No N/V/D, abd pain        Objective   Objective     Vital Signs:   Temp:  [96.1 °F (35.6 °C)-96.7 °F (35.9 °C)] 96.5 °F (35.8 °C)  Heart Rate:  [60-68] 68  Resp:  [16-18] 18  BP: ()/(68-96) 99/71  Flow (L/min):  [1-2] 2     Physical Exam:  Constitutional: No acute distress, awake, alert, sitting up in chair  HENT: NCAT, mucous membranes moist  Respiratory: Clear to auscultation bilaterally, respiratory effort normal, NC up on forehead but sats 92%  Cardiovascular: RRR, no murmurs, rubs, or gallops  Gastrointestinal: Positive bowel sounds, soft, nontender, nondistended  Musculoskeletal: Bilateral LE wraps  Psychiatric: Appropriate affect, cooperative  Neurologic: Oriented x 3, strength symmetric in all extremities, Cranial Nerves grossly intact to confrontation, speech clear  Skin: No rashes      Results Reviewed:  LAB RESULTS:      Lab 11/15/22  0507 11/15/22  0001 22   WBC 5.92  --  6.42   HEMOGLOBIN 10.5*  --  10.9*   HEMATOCRIT 33.3*  --  34.7*   PLATELETS 154  --  166   NEUTROS ABS 3.95  --  4.38   IMMATURE GRANS (ABS) 0.02  --  0.01   LYMPHS ABS 1.05  --  1.12   MONOS ABS 0.74  --  0.75   EOS ABS 0.13  --  0.12   MCV 91.7  --  91.6   SED RATE  --   --  8   CRP  --  0.67*  --    PROCALCITONIN  --  0.06  --    LACTATE  --   --  1.3         Lab 22  0423 11/15/22  0507 228 " 11/14/22  1155   SODIUM 140 141 142 141   POTASSIUM 4.3 4.4 5.1 4.7   CHLORIDE 102 104 103 101   CO2 31.0* 25.0 29.0 28.4   ANION GAP 7.0 12.0 10.0 11.6   BUN 42* 43* 43* 43*   CREATININE 1.68* 1.70* 1.95* 1.60*   EGFR 41.6* 41.0* 34.8* 44.1*   GLUCOSE 167* 157* 177* 201*   CALCIUM 8.7 9.0 9.3 9.4   MAGNESIUM 1.9 2.0  --   --    PHOSPHORUS 3.8  --   --   --    HEMOGLOBIN A1C  --  7.30*  --   --    TSH  --  8.020*  --   --          Lab 11/14/22 2036   TOTAL PROTEIN 6.4   ALBUMIN 3.80   GLOBULIN 2.6   ALT (SGPT) 55*   AST (SGOT) 23   BILIRUBIN 0.3   ALK PHOS 168*   LIPASE 10*         Lab 11/16/22  0423 11/15/22  0507 11/15/22  0001 11/14/22 2036 11/14/22  1155   PROBNP 23,101.0*  --   --  26,381.0* 23,706.0*   TROPONIN T  --  0.071* 0.076* 0.074*  --                  Brief Urine Lab Results  (Last result in the past 365 days)      Color   Clarity   Blood   Leuk Est   Nitrite   Protein   CREAT   Urine HCG        11/15/22 1003 Yellow   Cloudy   Moderate (2+)   Moderate (2+)   Negative   Negative                 Microbiology Results Abnormal     Procedure Component Value - Date/Time    Respiratory Panel PCR w/COVID-19(SARS-CoV-2) [facility] In-House, NP Swab in UTM/VTM, 3-4 HR TAT - Swab, Nasopharynx [[id number]]  (Normal) Collected: 11/15/22 0354    Lab Status: Final result Specimen: Swab from Nasopharynx Updated: 11/15/22 9715     ADENOVIRUS, PCR Not Detected     Coronavirus 229E Not Detected     Coronavirus HKU1 Not Detected     Coronavirus NL63 Not Detected     Coronavirus OC43 Not Detected     COVID19 Not Detected     Human Metapneumovirus Not Detected     Human Rhinovirus/Enterovirus Not Detected     Influenza A PCR Not Detected     Influenza B PCR Not Detected     Parainfluenza Virus 1 Not Detected     Parainfluenza Virus 2 Not Detected     Parainfluenza Virus 3 Not Detected     Parainfluenza Virus 4 Not Detected     RSV, PCR Not Detected     Bordetella pertussis pcr Not Detected     Bordetella  parapertussis PCR Not Detected     Chlamydophila pneumoniae PCR Not Detected     Mycoplasma pneumo by PCR Not Detected    Narrative:      In the setting of a positive respiratory panel with a viral infection PLUS a negative procalcitonin without other underlying concern for bacterial infection, consider observing off antibiotics or discontinuation of antibiotics and continue supportive care. If the respiratory panel is positive for atypical bacterial infection (Bordetella pertussis, Chlamydophila pneumoniae, or Mycoplasma pneumoniae), consider antibiotic de-escalation to target atypical bacterial infection.          CT Chest Without Contrast Diagnostic    Result Date: 11/14/2022  DATE OF EXAM: 11/14/2022 10:24 PM  PROCEDURE: CT CHEST WO CONTRAST DIAGNOSTIC-  INDICATIONS: Questionable infiltrates on chest x-ray, shortness of breath  COMPARISON: Chest radiograph performed the same day. Chest CT 08/12/2022.  TECHNIQUE: Routine transaxial slices were obtained through the chest without the administration of intravenous contrast. Reconstructed coronal and sagittal images were also obtained. Automated exposure control and iterative construction methods were used.  The radiation dose reduction device was turned on for each scan per the ALARA (As Low as Reasonably Achievable) protocol.  FINDINGS: There are small to moderate bilateral pleural effusions, similar to the prior chest CT. No pneumothorax is seen. Respiratory motion somewhat limits evaluation. There is mild consolidation adjacent to the effusions. No other significant infiltrate is seen at this time. Central airways appear patent. The nodules previously seen in the anterior right base are not visualized on this exam.   The heart appears enlarged. Status post median sternotomy. Pacemaker is present. No pericardial effusion. There is advanced calcific atherosclerosis of the coronary arteries. No definite acute aortic abnormality is seen. There is aortic  atherosclerosis. There is a small hiatal hernia. No significant lymphadenopathy is seen.  There is diffuse body wall edema. There is a small amount of ascites. Calcified gallstones are noted.  Degenerative changes are present in the spine. No aggressive osseous lesion is seen.      Impression: 1.  Small to moderate bilateral pleural effusions with mild adjacent consolidation, likely atelectasis, but pneumonia is not excluded. 2.  Body wall edema and small amount of ascites which may indicate heart failure/volume overload. 3.  Cardiomegaly. Calcific atherosclerosis. 4.  Cholelithiasis.  This report was finalized on 11/14/2022 11:13 PM by Eyad Wilburn MD.      XR Chest 1 View    Result Date: 11/14/2022  DATE OF EXAM: 11/14/2022 9:12 PM  PROCEDURE: XR CHEST 1 VW-  INDICATIONS: Chest Pain Triage Protocol  COMPARISON: 10/02/2022.  TECHNIQUE: Single radiographic view of the chest was obtained.  FINDINGS: There is suspicion for small pleural effusions. No pneumothorax. There are mild left basilar opacities.  Status post median sternotomy and CABG. The heart appears mildly enlarged. Pacemaker electrodes appear in expected positions.  Previously seen right lung opacities have improved.      Impression: 1.  Cardiomegaly. 2.  Small pleural effusions. 3.  Mild left basilar opacities which may represent atelectasis or infiltrate.  This report was finalized on 11/14/2022 9:45 PM by Eyad Wilburn MD.        Results for orders placed during the hospital encounter of 07/27/22    Adult Transthoracic Echo Complete W/ Cont if Necessary Per Protocol    Interpretation Summary  · Left ventricular ejection fraction appears to be 46 - 50%. Left ventricular systolic function is low normal.  · Left ventricular septal hypokinesis  · No significant valvular heart disease      I have reviewed the medications:  Scheduled Meds:apixaban, 5 mg, Oral, Q12H  aspirin, 324 mg, Oral, Once  aspirin, 81 mg, Oral, Daily  atorvastatin, 20 mg, Oral,  Nightly  bumetanide, 1 mg, Oral, BID  carvedilol, 3.125 mg, Oral, BID With Meals  docusate sodium, 100 mg, Oral, BID  doxycycline, 100 mg, Oral, Q12H  famotidine, 20 mg, Oral, Daily  insulin lispro, 0-7 Units, Subcutaneous, TID AC  lactobacillus acidophilus, 1 capsule, Oral, Daily  levothyroxine, 88 mcg, Oral, Q AM  pharmacy consult - [facility], , Does not apply, Daily  sacubitril-valsartan, 1 tablet, Oral, BID  sennosides-docusate, 1 tablet, Oral, Daily  sodium chloride, 10 mL, Intravenous, Q12H  tamsulosin, 0.4 mg, Oral, Daily      Continuous Infusions:   PRN Meds:.•  acetaminophen  •  dextrose  •  dextrose  •  glucagon (human recombinant)  •  hydrOXYzine  •  sodium chloride  •  sodium chloride    Assessment & Plan   Assessment & Plan     Active Hospital Problems    Diagnosis  POA   • **Acute on chronic HFrEF (heart failure with reduced ejection fraction) ([facility]) [I50.23]  Yes   • Elevated serum creatinine [R79.89]  Yes   • Anemia, chronic disease [D63.8]  Yes   • Pleural effusion, bilateral [J90]  Yes   • PVD (peripheral vascular disease) ([facility]) [I73.9]  Yes   • Status post amputation of great toe, left ([facility]) [Z89.412]  Not Applicable   • Diabetic foot ulcer ([facility]) [E11.621, L97.509]  Yes   • Generalized weakness [R53.1]  Yes   • GERD without esophagitis [K21.9]  Yes   • CKD (chronic kidney disease) stage 3, GFR 30-59 ml/min ([facility]) [N18.30]  Yes   • Elevated troponin [R77.8]  Yes   • SSS (sick sinus syndrome) ([facility]) [I49.5]  Yes   • Type 2 diabetes mellitus ([facility]) [E11.9]  Yes   • Hypertension [I10]  Yes   • Hyperlipidemia [E78.5]  Yes   • Hypothyroidism (acquired) [E03.9]  Yes   • S/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 Au [Z95.1]  Not Applicable      Resolved Hospital Problems   No resolved problems to display.        Brief Hospital Course to date:  Jermaine Singh is a 77 y.o. male     with a PMH significant for systolic CHF, CAD s/p CABG, HTN, HLD, SSS s/p pacemaker, history of DVT on Eliquis, CKD, diabetes mellitus type 2,  hypothyroidism, PVD, chronic anemia, GERD, diabetic foot ulcers status post left great toe amputation 8/2022 who comes to the ED due to shortness of breath.  Patient was recently hospitalized at [facility] where he was treated for CHF exacerbation.  He was discharged to [facility] rehab.  Wife at bedside reports progressive weakness, lower extremity edema, chills, chest pain, SOB.  Patient weighed 230 lbs at discharge from [facility] rehab, weight increased to 248 after d/c home.  Dr. Apodaca changed his diuretic regimen, he currently is weighing 240 pounds but feels like he is retaining fluid.         **Acute/chronic systolic CHF exacerbation   **Bilateral pleural effusions   **Elevated troponin   **CAD (s/p CABG)   **HTN, HLD   **SSS (s/p PPM)   -recent admission for same   -Cardiology/Dr. Apodaca following. Stopped hydralazine.  Recs continue entresto and coreg.  S/p diuresis with IV bumex recs to transition to bumex 1mg PO BID today.--bumex and coreg held this AM per nurse d/t low BP  -CT with bilateral pleural effusions and adjacent consolidation, likely atelectasis.  Pneumonia not excluded.  Monitor for signs of infection, low threshold to broaden antibiotic coverage  -proBNP >26,000  -troponin appears chronically elevated  -last ECHO 8/22 w/ EF 46-50%  -recent Lexiscan 10/3 that showed anteroseptal scar with no significant ischemia. LVEF 42%     **Diabetic foot ulcer (2nd toe left foot)   **S/p left great toe amputation (8/2/22)   **PVD   -pt follows w/ Dr. Márquez and Dr. Braga w/ ID; consult PRN   -on Doxycyline BID through February; continued   -CRP mildly elevated  -s/p debridement, leg wraps per PT wound care     **Elevated creatinine   **CKD Stage III  -baseline CR ~1.6-1.7  -current CR 1.95 w/ eGFR 34.8  -monitor closely w/ IV diuresis   -avoid nephrotoxic meds   -consider Nephrology consult    -measure post void residual     **Generalized weakness  -pt,ot and case mgt consult   -speech consulted also as  wife mentioned pt possibly choking intermittently??      **BLE DVTs (dx 8/22)   -continue Eliquis      **T2DM  -hem a1c 7.3  -holding routine novolog and Levemir (8 units)   -FSBG TID w/ LDSS     **Hypothyroidism   -increase levothyroxine from 75mcg daily to 88mcg daily     **GERD  -continue Pepcid      **?Depression  --d/w patient he is interested in trying SSRI/never took one before.  Will start on low dose celexa.  Advised that may be several weeks before he notices a difference and should f/u with his PCP.     Expected Discharge Location and Transportation: home with home health  Expected Discharge Date: 11/18/2022    DVT prophylaxis:  Medical DVT prophylaxis orders are present.     AM-PAC 6 Clicks Score (PT): 18 (11/15/22 1115)    CODE STATUS:   Code Status and Medical Interventions:   Ordered at: 11/15/22 5259     Level Of Support Discussed With:    Health Care Surrogate     Code Status (Patient has no pulse and is not breathing):    CPR (Attempt to Resuscitate)     Medical Interventions (Patient has pulse or is breathing):    Full Support       Reilly Thomas MD  11/16/22

## 2022-11-16 NOTE — THERAPY EVALUATION
Acute Care - Wound/Debridement Initial Evaluation  [facility]     Patient Name: Jermaine Singh  : 1945  MRN: [id number]  Today's Date: 2022             R foot      Admit Date: 2022    Visit Dx:    ICD-10-CM ICD-9-CM   1. Acute on chronic HFrEF (heart failure with reduced ejection fraction) ([facility])  I50.23 428.23   2. Unstable angina ([facility])  I20.0 411.1   3. Shortness of breath  R06.02 786.05   4. Pleural effusion  J90 511.9   5. 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  Preserved LV function   • Type 2 diabetes mellitus ([facility])   • Hypertension   • Hyperlipidemia   • Hypothyroidism (acquired)   • Vitamin D deficiency on Rx    • Diabetic neuropathy   • RBBB   • S/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 Au   • Atherosclerosis of native artery of both lower extremities with intermittent claudication ([facility])   • SSS (sick sinus syndrome) ([facility])   • Subacute osteomyelitis of left foot ([facility])   • Acute on chronic HFrEF (heart failure with reduced ejection fraction) ([facility])   • DVT, bilateral lower limbs ([facility])   • Elevated troponin   • Cellulitis of right lower extremity   • Obesity (BMI 30.0-34.9)   • Elevated serum creatinine   • Anemia, chronic disease   • Pleural effusion, bilateral   • PVD (peripheral vascular disease) ([facility])   • Status post amputation of great toe, left ([facility])   • Diabetic foot ulcer ([facility])   • Generalized weakness   • GERD without esophagitis   • CKD (chronic kidney disease) stage 3, GFR 30-59 ml/min ([facility])        Past Medical History:   Diagnosis Date   • Allergic    • Arthritis    • Asthma    • Coronary artery disease    • Diabetes mellitus ([facility])     started on inuslin 2018; started on po meds in ; checking blood sugars daily    • Disease of thyroid gland     po meds daily for hypothyroidism    • History of fracture as a child     rt leg- severe    •  Hyperlipidemia    • Hypertension    • Hypothyroidism    • Peripheral neuropathy    • Peripheral vascular disease ([facility])     s/p angiogram 2/19-needs stent in left leg    • Proximal phalanx fracture of the second digit extending into the second metatarsal joint 7/28/2022   • RBBB    • Right knee pain    • Vitamin D deficiency         Past Surgical History:   Procedure Laterality Date   • APPENDECTOMY     • CARDIAC CATHETERIZATION N/A 2/14/2019    Procedure: Left Heart Cath;  Surgeon: Cooper Apodaca MD;  Location:  [facility] CATH INVASIVE LOCATION;  Service: Cardiology   • CARDIAC ELECTROPHYSIOLOGY PROCEDURE N/A 5/18/2022    Procedure: DEVICE IMPLANT;  Surgeon: Cooper Apodaca MD;  Location: [facility] CATH INVASIVE LOCATION;  Service: Cardiology;  Laterality: N/A;   • COLONOSCOPY     • CORONARY ARTERY BYPASS GRAFT N/A 3/22/2019    Procedure: MEDIAN STERNOTOMY, CORONARY ARTERY BYPASS GRAFT X3, UTILIZING THE LEFT INTERNAL MAMMARY ARTERY, EVH AND OPEN HARVEST OF THE RIGHT GREATER SAPHENOUS VEIN, EXPLORATION OF THE LEFT LEG;  Surgeon: Ap Au MD;  Location:  [facility] OR;  Service: Cardiothoracic   • EYE SURGERY Bilateral     cataracts    • INTERVENTIONAL RADIOLOGY PROCEDURE N/A 7/29/2021    Procedure: Abdominal Aortagram with Runoff;  Surgeon: Jermaine Hernandez MD;  Location:  [facility] CATH INVASIVE LOCATION;  Service: Cardiovascular;  Laterality: N/A;   • KNEE ARTHROSCOPY      right x 2, left x 1   • LACERATION REPAIR      right leg   • LEG SURGERY      2 for fracture of rt leg    • TONSILLECTOMY      Adnoidectomy   • TRANS METATARSAL AMPUTATION Left 8/2/2022    Procedure: GREAT TOE AMPUTATION LEFT;  Surgeon: Gopal Márquez MD;  Location:  [facility] OR;  Service: Vascular;  Laterality: Left;           Wound 08/02/22 1023 Left anterior great toe (Active)   Wound Image   11/16/22 0859   Dressing Appearance dry;intact 11/16/22 0931   Closure HARMEET 11/16/22 0931   Base dressing in place, unable to visualize 11/16/22  0931       Wound 11/15/22 0653 Left posterior greater trochanter Other (comment) (Active)   Dressing Appearance open to air 11/15/22 1944   Periwound Skin Turgor soft 11/15/22 1944   Edges open 11/15/22 1944   Care, Wound cleansed with;other (see comments) 11/15/22 1944   Dressing Care open to air 11/15/22 1944   Periwound Care barrier ointment applied 11/16/22 0619       Wound 11/15/22 0654 Right lower leg Skin Tear (Active)   Dressing Appearance open to air 11/15/22 1944   Dressing Care open to air 11/15/22 1944   Periwound Care dry periwound area maintained 11/15/22 1944       Wound 11/16/22 0901 Left anterior knee Abrasion (Active)   Wound Image   11/16/22 0901   Dressing Appearance dry;intact 11/16/22 0931   Closure HARMEET 11/16/22 0931   Base dressing in place, unable to visualize 11/16/22 0931      Lymphedema     Row Name 11/16/22 1000             Lymphedema Edema Assessment    Ptting Edema Category By severity  -AB      Pitting Edema Mild  -AB         Skin Changes/Observations    Location/Assessment Lower Extremity  -AB      Lower Extremity Conditions bilateral:;dry;scab(s)  -AB      Lower Extremity Color/Pigment bilateral:;normal  -AB         Lymphedema Pulses/Capillary Refill    Lymphedema Pulses/Capillary Refill lower extremity pulses;capillary refill  -AB      Dorsalis Pedis Pulse right:;left:;+1 diminished  Edema present  -AB      Posterior Tibialis Pulse right:;left:;+1 diminished  Edema present  -AB      Capillary Refill lower extremity capillary refill  -AB      Lower Extremity Capillary Refill right:;left:;less than 3 seconds  -AB         Lymphedema Measurements    Measurement Type(s) Quick Girth  -AB      Quick Girth Areas Lower extremities  -AB         LLE Quick Girth (cm)    Mid foot 25 cm  -AB      Smallest ankle 23.5 cm  -AB      Largest calf 36.6 cm  -AB         RLE Quick Girth (cm)    Mid foot 26 cm  -AB      Smallest ankle 24.1 cm  -AB      Largest calf 39.4 cm  -AB      RLE Quick Girth Total  89.5  -AB         Compression/Skin Care    Compression/Skin Care skin care;wrapping location  -AB      Skin Care washed/dried;lotion applied  -AB      Wrapping Location lower extremity  -AB      Wrapping Location LE bilateral:;foot to knee  -AB      Wrapping Comments Compressogrips size 4/5 doubled and overlapping for gradient compression.  -AB            User Key  (r) = Recorded By, (t) = Taken By, (c) = Cosigned By    Initials Name Provider Type    AB Macrina Asher, PT Physical Therapist                WOUND DEBRIDEMENT  Total area of Debridement: ~4cm2  Debridement Site 1  Location- Site 1: BLE  Selective Debridement- Site 1: Wound Surface <20cmsq  Instruments- Site 1: tweezers, scissors  Excised Tissue Description- Site 1: minimum (scab)  Bleeding- Site 1: none               PT Assessment (last 12 hours)     PT Evaluation and Treatment     Row Name 11/16/22 0931          Physical Therapy Time and Intention    Subjective Information no complaints  -AB     Document Type evaluation;wound care  -AB     Mode of Treatment physical therapy  -AB     Symptoms Noted During/After Treatment none  -AB     Row Name 11/16/22 0931          General Information    Patient Profile Reviewed yes  -AB     Patient Observations alert;cooperative;agree to therapy  -AB     Pertinent History of Current Functional Problem Pt seen by WOC team during previous admission for prior amputation of L great toe. Recent onset of BLE swelling with blisters resulting in PT wound care consult.  -AB     Existing Precautions/Restrictions hip;other (see comments)  Pt reports that he is full weight bearing on LLE withoffloading shoe s/p L toe amputation 8/2022  -AB     Risks Reviewed patient:;increased discomfort  -AB     Benefits Reviewed patient:;improve skin integrity  -AB     Barriers to Rehab previous functional deficit  -AB     Row Name 11/16/22 0931          Pain    Pretreatment Pain Rating 0/10 - no pain  -AB     Posttreatment Pain Rating 0/10 -  no pain  -AB     Row Name 11/16/22 0931          Cognition    Affect/Mental Status (Cognition) WNL  -AB     Orientation Status (Cognition) oriented x 3  -AB     Follows Commands (Cognition) WNL  -AB     Cognitive Function WNL  -AB     Row Name             Wound 11/15/22 0653 Left posterior greater trochanter Other (comment)    Wound - Properties Group Placement Date: 11/15/22  - Placement Time: 0653  -BH Side: Left  -BH Orientation: posterior  -BH Location: greater trochanter  -BH Primary Wound Type: Other  -BH, open sore     Retired Wound - Properties Group Placement Date: 11/15/22  - Placement Time: 0653  -BH Side: Left  -BH Orientation: posterior  -BH Location: greater trochanter  -BH Primary Wound Type: Other  -BH, open sore     Retired Wound - Properties Group Date first assessed: 11/15/22  - Time first assessed: 0653  -BH Side: Left  -BH Location: greater trochanter  -BH Primary Wound Type: Other  -BH, open sore     Row Name             Wound 11/15/22 0654 Right lower leg Skin Tear    Wound - Properties Group Placement Date: 11/15/22  - Placement Time: 0654  -BH Side: Right  -BH Orientation: lower  -BH Location: leg  -BH Primary Wound Type: Skin tear  -BH    Retired Wound - Properties Group Placement Date: 11/15/22  - Placement Time: 0654  -BH Side: Right  -BH Orientation: lower  -BH Location: leg  -BH Primary Wound Type: Skin tear  -BH    Retired Wound - Properties Group Date first assessed: 11/15/22  - Time first assessed: 0654  -BH Side: Right  -BH Location: leg  -BH Primary Wound Type: Skin tear  -BH    Row Name 11/16/22 0931          Wound 08/02/22 1023 Left anterior great toe    Wound - Properties Group Placement Date: 08/02/22  -JR Placement Time: 1023  -JR Present on Hospital Admission: N  -JR Side: Left  -JR Orientation: anterior  -JR Location: great toe  -JR    Dressing Appearance dry;intact  -AB     Closure HARMEET  -AB     Base dressing in place, unable to visualize  -AB     Retired Wound -  Properties Group Placement Date: 08/02/22  -JR Placement Time: 1023  -JR Present on Hospital Admission: N  -JR Side: Left  -JR Orientation: anterior  -JR Location: great toe  -JR    Retired Wound - Properties Group Date first assessed: 08/02/22  -JR Time first assessed: 1023  -JR Present on Hospital Admission: N  -JR Side: Left  -JR Location: great toe  -JR    Row Name 11/16/22 0931          Wound 11/16/22 0901 Left anterior knee Abrasion    Wound - Properties Group Placement Date: 11/16/22  - Placement Time: 0901 - Side: Left  - Orientation: anterior  -MC Location: knee  -MC Primary Wound Type: Abrasion  -MC    Dressing Appearance dry;intact  -AB     Closure HARMEET  -AB     Base dressing in place, unable to visualize  -AB     Retired Wound - Properties Group Placement Date: 11/16/22  - Placement Time: 0901 - Side: Left  -MC Orientation: anterior  -MC Location: knee  -MC Primary Wound Type: Abrasion  -MC    Retired Wound - Properties Group Date first assessed: 11/16/22  - Time first assessed: 0901 - Side: Left  - Location: knee  -MC Primary Wound Type: Abrasion  -MC    Row Name 11/16/22 0931          Coping    Observed Emotional State calm;cooperative  -AB     Verbalized Emotional State acceptance  -AB     Trust Relationship/Rapport care explained;questions answered  -AB     Row Name 11/16/22 0931          Plan of Care Review    Plan of Care Reviewed With patient  -AB     Progress no change  -AB     Outcome Evaluation PT initial wound care eval completed. Pt presents with hx of L great toe amputation in Aug and recent onset of BLE swelling. Mild swelling noted B with R>L and scattered, no longer intact blisters that are dry/crusted. Pt reports swelling has improved greatly over last 24 hrs. PT was able to debride small amounts of nonviable/crusted skin from BLEs. Light MLWs applied in order to promote venous return and improve skin integrity. Pt will continue to benefit from skilled IPPT wound care.  Anticipated wrap change in 2-3 days.  -AB     Row Name 11/16/22 0931          Positioning and Restraints    Pre-Treatment Position sitting in chair/recliner  -AB     Post Treatment Position chair  -AB     In Chair notified nsg;sitting;call light within reach;encouraged to call for assist  -AB     Row Name 11/16/22 0931          Therapy Assessment/Plan (PT)    Patient/Family Therapy Goals Statement (PT) Improve swelling  -AB     Rehab Potential (PT) good, to achieve stated therapy goals  -AB     Criteria for Skilled Interventions Met (PT) yes;meets criteria;skilled treatment is necessary  -AB     Row Name 11/16/22 0931          Therapy Plan Review/Discharge Plan (PT)    Therapy Plan Review (PT) evaluation/treatment results reviewed;care plan/treatment goals reviewed;risks/benefits reviewed;participants voiced agreement with care plan;participants included;current/potential barriers reviewed;patient  -AB     Row Name 11/16/22 0931          Physical Therapy Goals    Wound Care Goal Selection (PT) wound care, PT goal 1  -AB     Row Name 11/16/22 0931          Wound Care Goal 1 (PT)    Wound Care Goal 1 (PT) Pt to demonstrate decreased BLE swelling by 15% in order to show improved venous return.  -AB           User Key  (r) = Recorded By, (t) = Taken By, (c) = Cosigned By    Initials Name Provider Type    Marilu Moran, RN Registered Nurse     Vivian De La Rosa, RN Registered Nurse    Macrina Wallace, PT Physical Therapist    Guerline Huerta RN Registered Nurse              Physical Therapy Education     Title: PT OT SLP Therapies (In Progress)     Topic: Physical Therapy (In Progress)     Point: Mobility training (Done)     Learning Progress Summary           Patient Acceptance, E, VU,NR by NS at 11/15/2022 1203                   Point: Home exercise program (Not Started)     Learner Progress:  Not documented in this visit.          Point: Body mechanics (Done)     Learning Progress Summary            Patient Acceptance, E, VU,NR by NS at 11/15/2022 1203                   Point: Precautions (Done)     Learning Progress Summary           Patient Acceptance, E, VU,NR by NS at 11/15/2022 1203                               User Key     Initials Effective Dates Name Provider Type Discipline    NS 06/16/21 -  Suha Noland, PT Physical Therapist PT                Recommendation and Plan  Anticipated Discharge Disposition (PT): inpatient rehabilitation facility  Planned Therapy Interventions (PT): wound care  Therapy Frequency (PT): daily  Plan of Care Reviewed With: patient   Progress: no change       Progress: no change  Outcome Evaluation: PT initial wound care eval completed. Pt presents with hx of L great toe amputation in Aug and recent onset of BLE swelling. Mild swelling noted B with R>L and scattered, no longer intact blisters that are dry/crusted. Pt reports swelling has improved greatly over last 24 hrs. PT was able to debride small amounts of nonviable/crusted skin from BLEs. Light MLWs applied in order to promote venous return and improve skin integrity. Pt will continue to benefit from skilled IPPT wound care. Anticipated wrap change in 2-3 days.  Plan of Care Reviewed With: patient            Time Calculation   PT Charges     Row Name 11/16/22 1021             Time Calculation    Start Time 0931  -AB      PT Goal Re-Cert Due Date 11/25/22  -AB         Untimed Charges    PT Eval/Re-eval Minutes 25  -AB      68157-[id number] comp below knee 15  -AB      10955-Dyudfbqxd debridement 15  -AB         Total Minutes    Untimed Charges Total Minutes 55  -AB       Total Minutes 55  -AB            User Key  (r) = Recorded By, (t) = Taken By, (c) = Cosigned By    Initials Name Provider Type    AB Macrina Asher, PT Physical Therapist                  Therapy Charges for Today     Code Description Service Date Service Provider Modifiers Qty    [id number]  PT RE-EVAL ESTABLISHED PLAN 2 11/16/2022 Macrina Asher,  PT GP 1    [id number] HC ROXANNE DEBRIDE OPEN WOUND UP TO 20CM 11/16/2022 Macrina Asher, PT GP 1    [id number] HC PT MULTI LAYER COMP SYS BELOW KNEE 11/16/2022 Macrina Asher, PT GP 1            PT G-Codes  Outcome Measure Options: AM-PAC 6 Clicks Daily Activity (OT)  AM-PAC 6 Clicks Score (PT): 18  AM-PAC 6 Clicks Score (OT): 18       Macrina Asher PT  11/16/2022

## 2022-11-17 LAB
ANION GAP SERPL CALCULATED.3IONS-SCNC: 10 MMOL/L (ref 5–15)
BUN SERPL-MCNC: 41 MG/DL (ref 8–23)
BUN/CREAT SERPL: 24.3 (ref 7–25)
CALCIUM SPEC-SCNC: 8.4 MG/DL (ref 8.6–10.5)
CHLORIDE SERPL-SCNC: 104 MMOL/L (ref 98–107)
CO2 SERPL-SCNC: 30 MMOL/L (ref 22–29)
CREAT SERPL-MCNC: 1.69 MG/DL (ref 0.76–1.27)
EGFRCR SERPLBLD CKD-EPI 2021: 41.3 ML/MIN/1.73
GLUCOSE BLDC GLUCOMTR-MCNC: 131 MG/DL (ref 70–130)
GLUCOSE BLDC GLUCOMTR-MCNC: 166 MG/DL (ref 70–130)
GLUCOSE BLDC GLUCOMTR-MCNC: 225 MG/DL (ref 70–130)
GLUCOSE SERPL-MCNC: 130 MG/DL (ref 65–99)
MAGNESIUM SERPL-MCNC: 1.8 MG/DL (ref 1.6–2.4)
POTASSIUM SERPL-SCNC: 4.8 MMOL/L (ref 3.5–5.2)
SODIUM SERPL-SCNC: 144 MMOL/L (ref 136–145)

## 2022-11-17 PROCEDURE — 83735 ASSAY OF MAGNESIUM: CPT | Performed by: INTERNAL MEDICINE

## 2022-11-17 PROCEDURE — 82962 GLUCOSE BLOOD TEST: CPT

## 2022-11-17 PROCEDURE — 80048 BASIC METABOLIC PNL TOTAL CA: CPT | Performed by: INTERNAL MEDICINE

## 2022-11-17 PROCEDURE — 97110 THERAPEUTIC EXERCISES: CPT

## 2022-11-17 PROCEDURE — 97530 THERAPEUTIC ACTIVITIES: CPT

## 2022-11-17 PROCEDURE — 99232 SBSQ HOSP IP/OBS MODERATE 35: CPT | Performed by: INTERNAL MEDICINE

## 2022-11-17 PROCEDURE — 63710000001 INSULIN LISPRO (HUMAN) PER 5 UNITS: Performed by: NURSE PRACTITIONER

## 2022-11-17 RX ORDER — FLUTICASONE PROPIONATE 50 MCG
2 SPRAY, SUSPENSION (ML) NASAL DAILY
Status: DISCONTINUED | OUTPATIENT
Start: 2022-11-17 | End: 2022-11-20 | Stop reason: HOSPADM

## 2022-11-17 RX ADMIN — TAMSULOSIN HYDROCHLORIDE 0.4 MG: 0.4 CAPSULE ORAL at 09:34

## 2022-11-17 RX ADMIN — ACETAMINOPHEN 650 MG: 325 TABLET, FILM COATED ORAL at 14:46

## 2022-11-17 RX ADMIN — ATORVASTATIN CALCIUM 20 MG: 20 TABLET, FILM COATED ORAL at 20:02

## 2022-11-17 RX ADMIN — DOCUSATE SODIUM 100 MG: 100 CAPSULE, LIQUID FILLED ORAL at 20:02

## 2022-11-17 RX ADMIN — CARVEDILOL 3.12 MG: 3.12 TABLET, FILM COATED ORAL at 17:43

## 2022-11-17 RX ADMIN — ASPIRIN 81 MG CHEWABLE TABLET 81 MG: 81 TABLET CHEWABLE at 09:33

## 2022-11-17 RX ADMIN — Medication 10 ML: at 20:02

## 2022-11-17 RX ADMIN — Medication 10 ML: at 09:36

## 2022-11-17 RX ADMIN — SACUBITRIL AND VALSARTAN 1 TABLET: 24; 26 TABLET, FILM COATED ORAL at 09:33

## 2022-11-17 RX ADMIN — FAMOTIDINE 20 MG: 20 TABLET ORAL at 09:34

## 2022-11-17 RX ADMIN — DOCUSATE SODIUM 100 MG: 100 CAPSULE, LIQUID FILLED ORAL at 09:32

## 2022-11-17 RX ADMIN — NYSTATIN: 100000 POWDER TOPICAL at 20:04

## 2022-11-17 RX ADMIN — BUMETANIDE 1 MG: 1 TABLET ORAL at 20:02

## 2022-11-17 RX ADMIN — INSULIN LISPRO 4 UNITS: 100 INJECTION, SOLUTION INTRAVENOUS; SUBCUTANEOUS at 13:28

## 2022-11-17 RX ADMIN — CARVEDILOL 3.12 MG: 3.12 TABLET, FILM COATED ORAL at 09:33

## 2022-11-17 RX ADMIN — FLUTICASONE PROPIONATE 2 SPRAY: 50 SPRAY, METERED NASAL at 13:28

## 2022-11-17 RX ADMIN — APIXABAN 5 MG: 5 TABLET, FILM COATED ORAL at 20:02

## 2022-11-17 RX ADMIN — DOXYCYCLINE 100 MG: 100 CAPSULE ORAL at 06:24

## 2022-11-17 RX ADMIN — BUMETANIDE 1 MG: 1 TABLET ORAL at 09:32

## 2022-11-17 RX ADMIN — SACUBITRIL AND VALSARTAN 1 TABLET: 24; 26 TABLET, FILM COATED ORAL at 20:02

## 2022-11-17 RX ADMIN — DOXYCYCLINE 100 MG: 100 CAPSULE ORAL at 17:43

## 2022-11-17 RX ADMIN — INSULIN LISPRO 4 UNITS: 100 INJECTION, SOLUTION INTRAVENOUS; SUBCUTANEOUS at 17:43

## 2022-11-17 RX ADMIN — APIXABAN 5 MG: 5 TABLET, FILM COATED ORAL at 09:32

## 2022-11-17 RX ADMIN — Medication 1 CAPSULE: at 09:33

## 2022-11-17 NOTE — CASE MANAGEMENT/SOCIAL WORK
Continued Stay Note  [facility]     Patient Name: Jermaine Singh  MRN: [id number]  Today's Date: 11/17/2022    Admit Date: 11/14/2022    Plan: Home with [facility] SN/PT/OT   Discharge Plan     Row Name 11/17/22 0859       Plan    Plan Home with [facility] SN/PT/OT    Patient/Family in Agreement with Plan yes    Plan Comments Spoke with patient at bedside and spouse by phone. Plan is home with [facility] SN/PT/OT. Patient was current with [facility] at admission. CM will continue to follow.    Final Discharge Disposition Code 06 - home with home health care               Discharge Codes    No documentation.               Expected Discharge Date and Time     Expected Discharge Date Expected Discharge Time    Nov 17, 2022             Akbar Reina RN

## 2022-11-17 NOTE — PROGRESS NOTES
[facility]  PROGRESS NOTE    Patient Name: Jermaine Singh  : 1945  MRN: [id number]    Date of Admission: 2022  Primary Care Physician: Mj Kennedy DO    Subjective   Subjective     CC:  SOA    HPI:  States that he didn't sleep at all last night.  Refuses to take celexa again tonight.  Not sure how his breathing is because he feels too bad from no sleep.  Doesn't think he's ready to go home.  C/o that continues to have LE swelling.      ROS:  Gen- No fevers, chills  CV- No chest pain, palpitations  Resp- No cough, +dyspnea  GI- No N/V/D, abd pain        Objective   Objective     Vital Signs:   Temp:  [96.6 °F (35.9 °C)-98 °F (36.7 °C)] 97 °F (36.1 °C)  Heart Rate:  [60-74] 62  Resp:  [17-18] 18  BP: ()/(55-79) 116/55  Flow (L/min):  [2] 2     Physical Exam:  Constitutional: No acute distress, awake, alert, sitting up in chair  HENT: NCAT, mucous membranes moist  Respiratory: Clear to auscultation bilaterally, respiratory effort normal, NC up on forehead but sats 92%  Cardiovascular: RRR, no murmurs, rubs, or gallops  Gastrointestinal: Positive bowel sounds, soft, nontender, nondistended  Musculoskeletal: 1-2+pitting edema bilateral LE's  Psychiatric: Appropriate affect, cooperative  Neurologic: Oriented x 3, strength symmetric in all extremities, Cranial Nerves grossly intact to confrontation, speech clear  Skin: No rashes      Results Reviewed:  LAB RESULTS:      Lab 11/15/22  0507 11/15/22  0001 22   WBC 5.92  --  6.42   HEMOGLOBIN 10.5*  --  10.9*   HEMATOCRIT 33.3*  --  34.7*   PLATELETS 154  --  166   NEUTROS ABS 3.95  --  4.38   IMMATURE GRANS (ABS) 0.02  --  0.01   LYMPHS ABS 1.05  --  1.12   MONOS ABS 0.74  --  0.75   EOS ABS 0.13  --  0.12   MCV 91.7  --  91.6   SED RATE  --   --  8   CRP  --  0.67*  --    PROCALCITONIN  --  0.06  --    LACTATE  --   --  1.3         Lab 22  0545 22  0423 11/15/22  050  11/14/22 2036 11/14/22  1155   SODIUM 144 140 141 142 141   POTASSIUM 4.8 4.3 4.4 5.1 4.7   CHLORIDE 104 102 104 103 101   CO2 30.0* 31.0* 25.0 29.0 28.4   ANION GAP 10.0 7.0 12.0 10.0 11.6   BUN 41* 42* 43* 43* 43*   CREATININE 1.69* 1.68* 1.70* 1.95* 1.60*   EGFR 41.3* 41.6* 41.0* 34.8* 44.1*   GLUCOSE 130* 167* 157* 177* 201*   CALCIUM 8.4* 8.7 9.0 9.3 9.4   MAGNESIUM 1.8 1.9 2.0  --   --    PHOSPHORUS  --  3.8  --   --   --    HEMOGLOBIN A1C  --   --  7.30*  --   --    TSH  --   --  8.020*  --   --          Lab 11/14/22 2036   TOTAL PROTEIN 6.4   ALBUMIN 3.80   GLOBULIN 2.6   ALT (SGPT) 55*   AST (SGOT) 23   BILIRUBIN 0.3   ALK PHOS 168*   LIPASE 10*         Lab 11/16/22  0423 11/15/22  0507 11/15/22  0001 11/14/22 2036 11/14/22  1155   PROBNP 23,101.0*  --   --  26,381.0* 23,706.0*   TROPONIN T  --  0.071* 0.076* 0.074*  --                  Brief Urine Lab Results  (Last result in the past 365 days)      Color   Clarity   Blood   Leuk Est   Nitrite   Protein   CREAT   Urine HCG        11/15/22 1003 Yellow   Cloudy   Moderate (2+)   Moderate (2+)   Negative   Negative                 Microbiology Results Abnormal     Procedure Component Value - Date/Time    Respiratory Panel PCR w/COVID-19(SARS-CoV-2) [facility]AD/DEEP In-House, NP Swab in [facility], 3-4 HR TAT - Swab, Nasopharynx [[id number]]  (Normal) Collected: 11/15/22 4130    Lab Status: Final result Specimen: Swab from Nasopharynx Updated: 11/15/22 0945     ADENOVIRUS, PCR Not Detected     Coronavirus 229E Not Detected     Coronavirus HKU1 Not Detected     Coronavirus NL63 Not Detected     Coronavirus OC43 Not Detected     COVID19 Not Detected     Human Metapneumovirus Not Detected     Human Rhinovirus/Enterovirus Not Detected     Influenza A PCR Not Detected     Influenza B PCR Not Detected     Parainfluenza Virus 1 Not Detected     Parainfluenza Virus 2 Not Detected     Parainfluenza Virus 3 Not Detected     Parainfluenza Virus 4 Not Detected      RSV, PCR Not Detected     Bordetella pertussis pcr Not Detected     Bordetella parapertussis PCR Not Detected     Chlamydophila pneumoniae PCR Not Detected     Mycoplasma pneumo by PCR Not Detected    Narrative:      In the setting of a positive respiratory panel with a viral infection PLUS a negative procalcitonin without other underlying concern for bacterial infection, consider observing off antibiotics or discontinuation of antibiotics and continue supportive care. If the respiratory panel is positive for atypical bacterial infection (Bordetella pertussis, Chlamydophila pneumoniae, or Mycoplasma pneumoniae), consider antibiotic de-escalation to target atypical bacterial infection.          No radiology results from the last 24 hrs    Results for orders placed during the hospital encounter of 07/27/22    Adult Transthoracic Echo Complete W/ Cont if Necessary Per Protocol    Interpretation Summary  · Left ventricular ejection fraction appears to be 46 - 50%. Left ventricular systolic function is low normal.  · Left ventricular septal hypokinesis  · No significant valvular heart disease      I have reviewed the medications:  Scheduled Meds:apixaban, 5 mg, Oral, Q12H  aspirin, 324 mg, Oral, Once  aspirin, 81 mg, Oral, Daily  atorvastatin, 20 mg, Oral, Nightly  bumetanide, 1 mg, Oral, BID  carvedilol, 3.125 mg, Oral, BID With Meals  citalopram, 20 mg, Oral, Daily  docusate sodium, 100 mg, Oral, BID  doxycycline, 100 mg, Oral, Q12H  famotidine, 20 mg, Oral, Daily  insulin lispro, 0-7 Units, Subcutaneous, TID AC  lactobacillus acidophilus, 1 capsule, Oral, Daily  levothyroxine, 88 mcg, Oral, Q AM  nystatin, , Topical, Q12H  pharmacy consult - MTM, , Does not apply, Daily  sacubitril-valsartan, 1 tablet, Oral, BID  sennosides-docusate, 1 tablet, Oral, Daily  sodium chloride, 10 mL, Intravenous, Q12H  tamsulosin, 0.4 mg, Oral, Daily      Continuous Infusions:   PRN Meds:.•  acetaminophen  •  dextrose  •  dextrose  •   glucagon (human recombinant)  •  hydrOXYzine  •  sodium chloride  •  sodium chloride    Assessment & Plan   Assessment & Plan     Active Hospital Problems    Diagnosis  POA   • **Acute on chronic HFrEF (heart failure with reduced ejection fraction) ([facility]) [I50.23]  Yes   • Elevated serum creatinine [R79.89]  Yes   • Anemia, chronic disease [D63.8]  Yes   • Pleural effusion, bilateral [J90]  Yes   • PVD (peripheral vascular disease) ([facility]) [I73.9]  Yes   • Status post amputation of great toe, left ([facility]) [Z89.412]  Not Applicable   • Diabetic foot ulcer ([facility]) [E11.621, L97.509]  Yes   • Generalized weakness [R53.1]  Yes   • GERD without esophagitis [K21.9]  Yes   • CKD (chronic kidney disease) stage 3, GFR 30-59 ml/min ([facility]) [N18.30]  Yes   • Elevated troponin [R77.8]  Yes   • SSS (sick sinus syndrome) ([facility]) [I49.5]  Yes   • Type 2 diabetes mellitus ([facility]) [E11.9]  Yes   • Hypertension [I10]  Yes   • Hyperlipidemia [E78.5]  Yes   • Hypothyroidism (acquired) [E03.9]  Yes   • S/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 Au [Z95.1]  Not Applicable      Resolved Hospital Problems   No resolved problems to display.        Brief Hospital Course to date:  Jermaine Singh is a 77 y.o. male     with a PMH significant for systolic CHF, CAD s/p CABG, HTN, HLD, SSS s/p pacemaker, history of DVT on Eliquis, CKD, diabetes mellitus type 2, hypothyroidism, PVD, chronic anemia, GERD, diabetic foot ulcers status post left great toe amputation 8/2022 who comes to the ED due to shortness of breath.  Patient was recently hospitalized at [facility] where he was treated for CHF exacerbation.  He was discharged to [facility] rehab.  Wife at bedside reports progressive weakness, lower extremity edema, chills, chest pain, SOB.  Patient weighed 230 lbs at discharge from [facility] rehab, weight increased to 248 after d/c home.  Dr. Apodaca changed his diuretic regimen, he currently is weighing 240 pounds but feels like he is retaining fluid.          **Acute/chronic systolic CHF exacerbation   **Bilateral pleural effusions   **Elevated troponin   **CAD (s/p CABG)   **HTN, HLD   **SSS (s/p PPM)   -recent admission for same   -Cardiology/Dr. Apodaca following. Stopped hydralazine.  Recs continue entresto and coreg.  S/p diuresis with IV bumex chmaged to bumex 1mg PO BID on 11/16/22  -CT with bilateral pleural effusions and adjacent consolidation, likely atelectasis.  Pneumonia not excluded.  Monitor for signs of infection, low threshold to broaden antibiotic coverage  -proBNP >26,000  -troponin appears chronically elevated  -last ECHO 8/22 w/ EF 46-50%  -recent Lexiscan 10/3 that showed anteroseptal scar with no significant ischemia. LVEF 42%     **Diabetic foot ulcer (2nd toe left foot)   **S/p left great toe amputation (8/2/22)   **PVD   -pt follows w/ Dr. Márquez and Dr. Braga w/ ID; consult PRN   -on Doxycyline BID through February; continued   -CRP mildly elevated  -s/p debridement, leg wraps per PT wound care     **Elevated creatinine   **CKD Stage III  -baseline CR ~1.6-1.7  -current CR 1.95 w/ eGFR 34.8  -monitor closely w/ IV diuresis   -avoid nephrotoxic meds    -measure post void residual     **Generalized weakness  -pt,ot and case mgt consult   -speech consulted also as wife mentioned pt possibly choking intermittently??      **BLE DVTs (dx 8/22)   -continue Eliquis      **T2DM  -hem a1c 7.3  -holding routine novolog and Levemir (8 units)   -FSBG TID w/ LDSS     **Hypothyroidism   -increase levothyroxine from 75mcg daily to 88mcg daily     **GERD  -continue Pepcid      **Depression  --d/w patient he is interested in trying SSRI/never took one before.  Started low dose celexa.  Advised that may be several weeks before he notices a difference and should f/u with his PCP.   Buit states that didn't sleep at all last night and doesn't wish to take celexa any more so will stop.  He can follow up for depression/a different medicine with his PCP.  Consider  referral to a therapist.    Expected Discharge Location and Transportation: home with home health  Expected Discharge Date: 11/18/2022 or 11/19/22    DVT prophylaxis:  Medical DVT prophylaxis orders are present.     AM-PAC 6 Clicks Score (PT): 18 (11/15/22 1115)    CODE STATUS:   Code Status and Medical Interventions:   Ordered at: 11/15/22 0223     Level Of Support Discussed With:    Health Care Surrogate     Code Status (Patient has no pulse and is not breathing):    CPR (Attempt to Resuscitate)     Medical Interventions (Patient has pulse or is breathing):    Full Support       Reilly Thomas MD  11/17/22

## 2022-11-17 NOTE — PLAN OF CARE
Pt up in chair during the shift. Pt's VSS, V paced, RA during day. No complaints of pain. Continue POC.                   Problem: Adult Inpatient Plan of Care  Goal: Plan of Care Review  Outcome: Ongoing, Progressing  Goal: Patient-Specific Goal (Individualized)  Outcome: Ongoing, Progressing  Goal: Absence of Hospital-Acquired Illness or Injury  Outcome: Ongoing, Progressing  Intervention: Identify and Manage Fall Risk  Recent Flowsheet Documentation  Taken 11/16/2022 1800 by Kiaa, Samantha, RN  Safety Promotion/Fall Prevention: safety round/check completed  Taken 11/16/2022 1600 by Kaia, Samantha, RN  Safety Promotion/Fall Prevention: safety round/check completed  Taken 11/16/2022 1400 by Kaia, Samantha, RN  Safety Promotion/Fall Prevention: safety round/check completed  Taken 11/16/2022 1200 by Kaia, Samantha, RN  Safety Promotion/Fall Prevention: safety round/check completed  Taken 11/16/2022 1000 by Kaia, Samantha, RN  Safety Promotion/Fall Prevention: safety round/check completed  Taken 11/16/2022 0817 by Kaia, Samantha, RN  Safety Promotion/Fall Prevention:  • activity supervised  • clutter free environment maintained  • fall prevention program maintained  • nonskid shoes/slippers when out of bed  • room organization consistent  • safety round/check completed  Intervention: Prevent Skin Injury  Recent Flowsheet Documentation  Taken 11/16/2022 1800 by Samantha Marti RN  Body Position:  • position changed independently  • supine  Skin Protection:  • adhesive use limited  • incontinence pads utilized  • tubing/devices free from skin contact  Taken 11/16/2022 1600 by Samantha Marti RN  Body Position: supine  Skin Protection:  • adhesive use limited  • incontinence pads utilized  • tubing/devices free from skin contact  Taken 11/16/2022 1400 by Samantha Marti RN  Body Position: supine  Skin Protection:  • adhesive use limited  • incontinence pads utilized  •  tubing/devices free from skin contact  Taken 11/16/2022 1200 by Samantha Marti RN  Body Position: supine  Skin Protection:  • adhesive use limited  • incontinence pads utilized  • tubing/devices free from skin contact  Taken 11/16/2022 1000 by Samantha Marti RN  Body Position:  • supine  • position changed independently  Skin Protection:  • adhesive use limited  • incontinence pads utilized  • tubing/devices free from skin contact  Taken 11/16/2022 0817 by Samantha Marti RN  Body Position:  • supine  • position changed independently  Skin Protection:  • adhesive use limited  • incontinence pads utilized  • tubing/devices free from skin contact  Intervention: Prevent and Manage VTE (Venous Thromboembolism) Risk  Recent Flowsheet Documentation  Taken 11/16/2022 1800 by Samantha Marti RN  Activity Management: up in chair  Taken 11/16/2022 1600 by Samantha Marti RN  Activity Management: up in chair  Taken 11/16/2022 1400 by Samantha Marti RN  Activity Management: up in chair  Taken 11/16/2022 1200 by Samantha Marti RN  Activity Management: up in chair  Taken 11/16/2022 1000 by Samantha Marti RN  Activity Management: up in chair  Taken 11/16/2022 0817 by Samantha Marti RN  Activity Management:  • activity adjusted per tolerance  • activity encouraged  • up in chair  VTE Prevention/Management: (eliquis) other (see comments)  Intervention: Prevent Infection  Recent Flowsheet Documentation  Taken 11/16/2022 1800 by Samantha Marti RN  Infection Prevention:  • cohorting utilized  • rest/sleep promoted  • single patient room provided  Taken 11/16/2022 1600 by Samantha Marti RN  Infection Prevention:  • cohorting utilized  • rest/sleep promoted  • single patient room provided  Taken 11/16/2022 1400 by Samantha Marti RN  Infection Prevention:  • cohorting utilized  • rest/sleep promoted  • single patient room provided  Taken 11/16/2022 1200 by Kaia  FRAN Singh  Infection Prevention:  • cohorting utilized  • rest/sleep promoted  • single patient room provided  Taken 11/16/2022 1000 by Samantha Marti RN  Infection Prevention:  • cohorting utilized  • rest/sleep promoted  • single patient room provided  Taken 11/16/2022 0817 by Samantha Marti RN  Infection Prevention:  • cohorting utilized  • rest/sleep promoted  • single patient room provided  Goal: Optimal Comfort and Wellbeing  Outcome: Ongoing, Progressing  Intervention: Monitor Pain and Promote Comfort  Recent Flowsheet Documentation  Taken 11/16/2022 1212 by Samantha Marti RN  Pain Management Interventions: see MAR  Intervention: Provide Person-Centered Care  Recent Flowsheet Documentation  Taken 11/16/2022 0817 by Samantha Marti RN  Trust Relationship/Rapport:  • care explained  • questions answered  • questions encouraged  • thoughts/feelings acknowledged  Goal: Readiness for Transition of Care  Outcome: Ongoing, Progressing     Problem: Fall Injury Risk  Goal: Absence of Fall and Fall-Related Injury  Outcome: Ongoing, Progressing  Intervention: Identify and Manage Contributors  Recent Flowsheet Documentation  Taken 11/16/2022 1000 by Samantha Marti RN  Medication Review/Management: medications reviewed  Taken 11/16/2022 0817 by Samantha Marti RN  Medication Review/Management: medications reviewed  Intervention: Promote Injury-Free Environment  Recent Flowsheet Documentation  Taken 11/16/2022 1800 by Kaia, Samantha, RN  Safety Promotion/Fall Prevention: safety round/check completed  Taken 11/16/2022 1600 by Kaia, Samantha, RN  Safety Promotion/Fall Prevention: safety round/check completed  Taken 11/16/2022 1400 by Kaia, Samantha, RN  Safety Promotion/Fall Prevention: safety round/check completed  Taken 11/16/2022 1200 by Kaia, Samantha, RN  Safety Promotion/Fall Prevention: safety round/check completed  Taken 11/16/2022 1000 by Samantah Marti  RN  Safety Promotion/Fall Prevention: safety round/check completed  Taken 11/16/2022 0817 by Kaia, Samantha, RN  Safety Promotion/Fall Prevention:  • activity supervised  • clutter free environment maintained  • fall prevention program maintained  • nonskid shoes/slippers when out of bed  • room organization consistent  • safety round/check completed     Problem: Pain Acute  Goal: Acceptable Pain Control and Functional Ability  Outcome: Ongoing, Progressing  Intervention: Prevent or Manage Pain  Recent Flowsheet Documentation  Taken 11/16/2022 1000 by Samantha Marti RN  Medication Review/Management: medications reviewed  Taken 11/16/2022 0817 by Samantha Marti RN  Medication Review/Management: medications reviewed  Intervention: Develop Pain Management Plan  Recent Flowsheet Documentation  Taken 11/16/2022 1212 by Samantha Marti RN  Pain Management Interventions: see MAR  Intervention: Optimize Psychosocial Wellbeing  Recent Flowsheet Documentation  Taken 11/16/2022 1800 by Samantha Marti RN  Diversional Activities: television  Taken 11/16/2022 1600 by Samantha Marti RN  Diversional Activities: television  Taken 11/16/2022 1400 by Samantha Marti RN  Diversional Activities: television  Taken 11/16/2022 0817 by Samantha Marti RN  Diversional Activities: television     Problem: Chest Pain  Goal: Resolution of Chest Pain Symptoms  Outcome: Ongoing, Progressing     Problem: Diabetes Comorbidity  Goal: Blood Glucose Level Within Targeted Range  Outcome: Ongoing, Progressing     Problem: Heart Failure Comorbidity  Goal: Maintenance of Heart Failure Symptom Control  Outcome: Ongoing, Progressing  Intervention: Maintain Heart Failure-Management  Recent Flowsheet Documentation  Taken 11/16/2022 1000 by Samantha Marti RN  Medication Review/Management: medications reviewed  Taken 11/16/2022 0817 by Samantha Marti RN  Medication Review/Management: medications reviewed      Problem: Skin Injury Risk Increased  Goal: Skin Health and Integrity  Outcome: Ongoing, Progressing  Intervention: Optimize Skin Protection  Recent Flowsheet Documentation  Taken 11/16/2022 1800 by Samantha Marti RN  Pressure Reduction Techniques:  • pressure points protected  • rest period provided between sit times  • frequent weight shift encouraged  Head of Bed (HOB) Positioning: [facility] at 90 degrees  Pressure Reduction Devices: positioning supports utilized  Skin Protection:  • adhesive use limited  • incontinence pads utilized  • tubing/devices free from skin contact  Taken 11/16/2022 1600 by Samantha Marti RN  Pressure Reduction Techniques:  • positioned off wounds  • pressure points protected  Head of Bed (HOB) Positioning: [facility] at 90 degrees  Pressure Reduction Devices: positioning supports utilized  Skin Protection:  • adhesive use limited  • incontinence pads utilized  • tubing/devices free from skin contact  Taken 11/16/2022 1400 by Samantha Marti RN  Pressure Reduction Techniques:  • frequent weight shift encouraged  • positioned off wounds  • pressure points protected  Head of Bed (HOB) Positioning: [facility] at 90 degrees  Pressure Reduction Devices: positioning supports utilized  Skin Protection:  • adhesive use limited  • incontinence pads utilized  • tubing/devices free from skin contact  Taken 11/16/2022 1200 by Samantha Marti RN  Pressure Reduction Techniques:  • positioned off wounds  • pressure points protected  • frequent weight shift encouraged  Head of Bed (HOB) Positioning: [facility] at 90 degrees  Pressure Reduction Devices: positioning supports utilized  Skin Protection:  • adhesive use limited  • incontinence pads utilized  • tubing/devices free from skin contact  Taken 11/16/2022 1000 by Samantha Marti RN  Pressure Reduction Techniques:  • pressure points protected  • positioned off wounds  Head of Bed (HOB) Positioning: [facility] at 90 degrees  Pressure Reduction Devices:  positioning supports utilized  Skin Protection:  • adhesive use limited  • incontinence pads utilized  • tubing/devices free from skin contact  Taken 11/16/2022 0817 by Samantha Marti RN  Pressure Reduction Techniques:  • positioned off wounds  • pressure points protected  • frequent weight shift encouraged  Head of Bed (HOB) Positioning: HOB at 90 degrees  Pressure Reduction Devices: positioning supports utilized  Skin Protection:  • adhesive use limited  • incontinence pads utilized  • tubing/devices free from skin contact     Problem: Adjustment to Illness (Heart Failure)  Goal: Optimal Coping  Outcome: Ongoing, Progressing     Problem: Cardiac Output Decreased (Heart Failure)  Goal: Optimal Cardiac Output  Outcome: Ongoing, Progressing     Problem: Dysrhythmia (Heart Failure)  Goal: Stable Heart Rate and Rhythm  Outcome: Ongoing, Progressing     Problem: Fluid Imbalance (Heart Failure)  Goal: Fluid Balance  Outcome: Ongoing, Progressing     Problem: Functional Ability Impaired (Heart Failure)  Goal: Optimal Functional Ability  Outcome: Ongoing, Progressing  Intervention: Optimize Functional Ability  Recent Flowsheet Documentation  Taken 11/16/2022 1800 by Samantha Marti RN  Activity Management: up in chair  Taken 11/16/2022 1600 by Samantha Marti RN  Activity Management: up in chair  Taken 11/16/2022 1400 by Samantha Marti RN  Activity Management: up in chair  Taken 11/16/2022 1200 by Samantha Marti RN  Activity Management: up in chair  Taken 11/16/2022 1000 by Samantha Marti RN  Activity Management: up in chair  Taken 11/16/2022 0817 by Samantha Marti RN  Activity Management:  • activity adjusted per tolerance  • activity encouraged  • up in chair     Problem: Oral Intake Inadequate (Heart Failure)  Goal: Optimal Nutrition Intake  Outcome: Ongoing, Progressing     Problem: Respiratory Compromise (Heart Failure)  Goal: Effective Oxygenation and Ventilation  Outcome:  Ongoing, Progressing     Problem: Sleep Disordered Breathing (Heart Failure)  Goal: Effective Breathing Pattern During Sleep  Outcome: Ongoing, Progressing   Goal Outcome Evaluation:

## 2022-11-17 NOTE — PROGRESS NOTES
" [facility] - Progress Note    Jermaine Singh  1945  [id number]    11/17/22, 08:49 EST      Chief Complaint: Following for chronic HFrEF    Subjective:   Sitting up in chair.  Did not sleep well last night at all.  Breathing continues to be improved.  Feels much better.      Review of Systems:  Pertinent positives are listed above and in physical exam.  All others have been reviewed and are negative.    apixaban, 5 mg, Oral, Q12H  aspirin, 324 mg, Oral, Once  aspirin, 81 mg, Oral, Daily  atorvastatin, 20 mg, Oral, Nightly  bumetanide, 1 mg, Oral, BID  carvedilol, 3.125 mg, Oral, BID With Meals  citalopram, 20 mg, Oral, Daily  docusate sodium, 100 mg, Oral, BID  doxycycline, 100 mg, Oral, Q12H  famotidine, 20 mg, Oral, Daily  insulin lispro, 0-7 Units, Subcutaneous, TID AC  lactobacillus acidophilus, 1 capsule, Oral, Daily  levothyroxine, 88 mcg, Oral, Q AM  nystatin, , Topical, Q12H  pharmacy consult - MTM, , Does not apply, Daily  sacubitril-valsartan, 1 tablet, Oral, BID  sennosides-docusate, 1 tablet, Oral, Daily  sodium chloride, 10 mL, Intravenous, Q12H  tamsulosin, 0.4 mg, Oral, Daily        Objective:  Vitals:   height is 190.5 cm (75\") and weight is 105 kg (230 lb 9.6 oz). His oral temperature is 97 °F (36.1 °C). His blood pressure is 116/55 and his pulse is 62. His respiration is 18 and oxygen saturation is 98%.       Intake/Output Summary (Last 24 hours) at 11/17/2022 0808  Last data filed at 11/17/2022 0600  Gross per 24 hour   Intake 1575 ml   Output 1625 ml   Net -50 ml       Physical Exam:  General:  WN, NAD, A and O x3.  CV:  Normal S1,S2. No murmur, rub, or gallop.  Resp:  CTA [place], equal, nonlabored.  Abd:  Soft, + BS, no organomegaly. Nontender to palpation.  Extrem:  1+ edema BLE, 2+ pedal/PT pulses.            Results from last 7 days   Lab Units 11/15/22  0507   WBC 10*3/mm3 5.92   HEMOGLOBIN g/dL 10.5*   HEMATOCRIT % 33.3*   PLATELETS 10*3/mm3 154     Results from last 7 days "   Lab Units 11/17/22  0545 11/15/22  0507 11/14/22  2036   SODIUM mmol/L 144   < > 142   POTASSIUM mmol/L 4.8   < > 5.1   CHLORIDE mmol/L 104   < > 103   CO2 mmol/L 30.0*   < > 29.0   BUN mg/dL 41*   < > 43*   CREATININE mg/dL 1.69*   < > 1.95*   CALCIUM mg/dL 8.4*   < > 9.3   BILIRUBIN mg/dL  --   --  0.3   ALK PHOS U/L  --   --  168*   ALT (SGPT) U/L  --   --  55*   AST (SGOT) U/L  --   --  23   GLUCOSE mg/dL 130*   < > 177*    < > = values in this interval not displayed.         Results from last 7 days   Lab Units 11/15/22  0507   TSH uIU/mL 8.020*         Results from last 7 days   Lab Units 11/16/22  0423   PROBNP pg/mL 23,101.0*       Tele:  NSR    ASSESSMENT:  -  Chronic Systolic Heart Failure with subacute decompensation. Echo 8/11/22 reviewed:  EF 46-50%, LV septal HK, no significant valvular dz.   -  CAD with previous 3V CABG 2/2019  -  Chronic non specific troponin elevation without rise/fall.  Nuclear MPS 10/3/22:  No isotope mismatch defects, no ischemia noted.  Study consistent with previous ant wall MI.  -  SSS with [facility]  -  PVDz s/p fairly recent Left great toe amputation  -  BLE DVT 8/2022 on NOAC  -  CKDz  -  HTN  -  Hypothyroidism with abnormal TSH  -  Depression        PLAN:  -  Overall, patient appears to be fairly well compensated.  His numbers (proBNP, Cr, troponin) are not far from baseline.  -   Continue 1mg Bumex PO BID.  Continue strict I/Os, daily wts.  Repeat proBNP and BMP reviewed.  -  DC'd Hydralazine.   Continue Entresto, Continue Coreg with adjusted timing.  -  Patient appears clinically depressed, which has been a historic issue with multiple medical conditions/hospitalizations.  Will defer to primary service to address.  -  Continue PT/OT.  Patient needs to be up out of bed, ambulating daily.  Will share patient concerns with Case Management for discharge needs.  -  Cardiology will continue to follow. Hopefully home in 24-48 hours.        I discussed the patient's  findings and my recommendations with the patient, any present family members, and the nursing staff.  Cooper Apodaca MD saw and examined patient, verified hx and PE, read all radiographic studies, reviewed labs and micro data, and formulated dx, plan for treatment and all medical decision making.      Meagn Shukla PA-C  11/17/22, 10:15 EST

## 2022-11-17 NOTE — PLAN OF CARE
Goal Outcome Evaluation:   Patient resting in bed. Call light within reach. Paced rythum ra.     Problem: Adult Inpatient Plan of Care  Goal: Plan of Care Review  Outcome: Ongoing, Progressing  Goal: Patient-Specific Goal (Individualized)  Outcome: Ongoing, Progressing  Goal: Absence of Hospital-Acquired Illness or Injury  Outcome: Ongoing, Progressing  Intervention: Identify and Manage Fall Risk  Recent Flowsheet Documentation  Taken 11/17/2022 1800 by Zully Swan RN  Safety Promotion/Fall Prevention:   activity supervised   assistive device/personal items within reach   clutter free environment maintained   elopement precautions   fall prevention program maintained  Taken 11/17/2022 1600 by Zully Swan RN  Safety Promotion/Fall Prevention:   activity supervised   assistive device/personal items within reach   clutter free environment maintained   elopement precautions   fall prevention program maintained  Taken 11/17/2022 1400 by Zully Swan RN  Safety Promotion/Fall Prevention:   activity supervised   assistive device/personal items within reach   elopement precautions   clutter free environment maintained   fall prevention program maintained  Taken 11/17/2022 1200 by Zully Swan RN  Safety Promotion/Fall Prevention:   activity supervised   assistive device/personal items within reach   clutter free environment maintained   elopement precautions   fall prevention program maintained  Taken 11/17/2022 1000 by Zully Swan RN  Safety Promotion/Fall Prevention:   activity supervised   assistive device/personal items within reach   clutter free environment maintained   elopement precautions   fall prevention program maintained  Taken 11/17/2022 0800 by Zully Swan RN  Safety Promotion/Fall Prevention:   activity supervised   assistive device/personal items within reach   clutter free environment maintained   elopement precautions   fall prevention program  maintained  Intervention: Prevent Skin Injury  Recent Flowsheet Documentation  Taken 11/17/2022 1800 by Zully Swan RN  Body Position: position changed independently  Taken 11/17/2022 1600 by Zully Swan RN  Body Position: position changed independently  Taken 11/17/2022 1400 by Zully Swan RN  Body Position: position changed independently  Taken 11/17/2022 1200 by Zully Swan RN  Body Position: position changed independently  Taken 11/17/2022 1000 by Zully Swan RN  Body Position: position changed independently  Taken 11/17/2022 0800 by Zully Swan RN  Body Position: position changed independently  Skin Protection:   adhesive use limited   tubing/devices free from skin contact  Intervention: Prevent and Manage VTE (Venous Thromboembolism) Risk  Recent Flowsheet Documentation  Taken 11/17/2022 1800 by Zully Swan RN  Activity Management: activity adjusted per tolerance  Taken 11/17/2022 1600 by Zully Swan RN  Activity Management: activity adjusted per tolerance  Taken 11/17/2022 1400 by Zully Swan RN  Activity Management: activity adjusted per tolerance  Taken 11/17/2022 1200 by Zully Swan RN  Activity Management: activity adjusted per tolerance  Taken 11/17/2022 1000 by Zully Swan RN  Activity Management: activity adjusted per tolerance  Taken 11/17/2022 0800 by Zully Swan RN  Activity Management: activity adjusted per tolerance  VTE Prevention/Management: (see mar) other (see comments)  Range of Motion: active ROM (range of motion) encouraged  Intervention: Prevent Infection  Recent Flowsheet Documentation  Taken 11/17/2022 1800 by Zully Swan RN  Infection Prevention:   environmental surveillance performed   equipment surfaces disinfected   hand hygiene promoted   rest/sleep promoted  Taken 11/17/2022 1600 by Zully Swan RN  Infection Prevention:   environmental surveillance performed   equipment surfaces  disinfected   hand hygiene promoted   rest/sleep promoted  Taken 11/17/2022 1400 by Zully Swan RN  Infection Prevention:   environmental surveillance performed   equipment surfaces disinfected   hand hygiene promoted   rest/sleep promoted  Taken 11/17/2022 1200 by Zully Swan RN  Infection Prevention:   environmental surveillance performed   equipment surfaces disinfected   hand hygiene promoted   rest/sleep promoted  Taken 11/17/2022 1000 by Zully Swan RN  Infection Prevention:   environmental surveillance performed   equipment surfaces disinfected   hand hygiene promoted   rest/sleep promoted  Taken 11/17/2022 0800 by Zully Swan RN  Infection Prevention:   environmental surveillance performed   equipment surfaces disinfected   hand hygiene promoted   rest/sleep promoted  Goal: Optimal Comfort and Wellbeing  Outcome: Ongoing, Progressing  Intervention: Provide Person-Centered Care  Recent Flowsheet Documentation  Taken 11/17/2022 0800 by Zully Swan RN  Trust Relationship/Rapport:   care explained   choices provided   questions answered   questions encouraged  Goal: Readiness for Transition of Care  Outcome: Ongoing, Progressing     Problem: Fall Injury Risk  Goal: Absence of Fall and Fall-Related Injury  Outcome: Ongoing, Progressing  Intervention: Identify and Manage Contributors  Recent Flowsheet Documentation  Taken 11/17/2022 1800 by Zully Swan RN  Medication Review/Management: medications reviewed  Taken 11/17/2022 1600 by Zully Swan RN  Medication Review/Management: medications reviewed  Taken 11/17/2022 1400 by Zully Swan RN  Medication Review/Management: medications reviewed  Taken 11/17/2022 1200 by Zully Swan RN  Medication Review/Management: medications reviewed  Taken 11/17/2022 1000 by Zully Swan RN  Medication Review/Management: medications reviewed  Taken 11/17/2022 0800 by Zully Swan RN  Medication  Review/Management: medications reviewed  Intervention: Promote Injury-Free Environment  Recent Flowsheet Documentation  Taken 11/17/2022 1800 by Zully Swan RN  Safety Promotion/Fall Prevention:   activity supervised   assistive device/personal items within reach   clutter free environment maintained   elopement precautions   fall prevention program maintained  Taken 11/17/2022 1600 by Zully Swan RN  Safety Promotion/Fall Prevention:   activity supervised   assistive device/personal items within reach   clutter free environment maintained   elopement precautions   fall prevention program maintained  Taken 11/17/2022 1400 by Zully Swan RN  Safety Promotion/Fall Prevention:   activity supervised   assistive device/personal items within reach   elopement precautions   clutter free environment maintained   fall prevention program maintained  Taken 11/17/2022 1200 by Zully Swan RN  Safety Promotion/Fall Prevention:   activity supervised   assistive device/personal items within reach   clutter free environment maintained   elopement precautions   fall prevention program maintained  Taken 11/17/2022 1000 by Zully Swan RN  Safety Promotion/Fall Prevention:   activity supervised   assistive device/personal items within reach   clutter free environment maintained   elopement precautions   fall prevention program maintained  Taken 11/17/2022 0800 by Zully Swan RN  Safety Promotion/Fall Prevention:   activity supervised   assistive device/personal items within reach   clutter free environment maintained   elopement precautions   fall prevention program maintained     Problem: Pain Acute  Goal: Acceptable Pain Control and Functional Ability  Outcome: Ongoing, Progressing  Intervention: Prevent or Manage Pain  Recent Flowsheet Documentation  Taken 11/17/2022 1800 by Zully Swan RN  Medication Review/Management: medications reviewed  Taken 11/17/2022 1600 by Zully Swan  RN  Medication Review/Management: medications reviewed  Taken 11/17/2022 1400 by Zully Swan RN  Medication Review/Management: medications reviewed  Taken 11/17/2022 1200 by Zully Swan RN  Medication Review/Management: medications reviewed  Taken 11/17/2022 1000 by Zully Swan RN  Medication Review/Management: medications reviewed  Taken 11/17/2022 0800 by Zully Swan RN  Medication Review/Management: medications reviewed  Intervention: Optimize Psychosocial Wellbeing  Recent Flowsheet Documentation  Taken 11/17/2022 0800 by Zully Swan RN  Supportive Measures:   active listening utilized   verbalization of feelings encouraged  Diversional Activities: television     Problem: Diabetes Comorbidity  Goal: Blood Glucose Level Within Targeted Range  Outcome: Ongoing, Progressing     Problem: Chest Pain  Goal: Resolution of Chest Pain Symptoms  Outcome: Ongoing, Progressing     Problem: Heart Failure Comorbidity  Goal: Maintenance of Heart Failure Symptom Control  Outcome: Ongoing, Progressing  Intervention: Maintain Heart Failure-Management  Recent Flowsheet Documentation  Taken 11/17/2022 1800 by Zully Swan RN  Medication Review/Management: medications reviewed  Taken 11/17/2022 1600 by Zully Swan RN  Medication Review/Management: medications reviewed  Taken 11/17/2022 1400 by Zully Swan RN  Medication Review/Management: medications reviewed  Taken 11/17/2022 1200 by Zully Swan RN  Medication Review/Management: medications reviewed  Taken 11/17/2022 1000 by Zully Swan RN  Medication Review/Management: medications reviewed  Taken 11/17/2022 0800 by Zully Swan RN  Medication Review/Management: medications reviewed     Problem: Skin Injury Risk Increased  Goal: Skin Health and Integrity  Outcome: Ongoing, Progressing  Intervention: Optimize Skin Protection  Recent Flowsheet Documentation  Taken 11/17/2022 1800 by Zully Swan RN  Head  of Bed (HOB) Positioning: HOB elevated  Taken 11/17/2022 1600 by Zully Swan RN  Head of Bed (HOB) Positioning: HOB elevated  Taken 11/17/2022 1400 by Zully Swan RN  Head of Bed (HOB) Positioning: HOB elevated  Taken 11/17/2022 1200 by Zully Swan RN  Head of Bed (HOB) Positioning: HOB elevated  Taken 11/17/2022 1000 by Zully Swan RN  Head of Bed (HOB) Positioning: HOB elevated  Taken 11/17/2022 0800 by Zully Swan RN  Pressure Reduction Techniques: weight shift assistance provided  Head of Bed (HOB) Positioning: HOB elevated  Pressure Reduction Devices: pressure-redistributing mattress utilized  Skin Protection:   adhesive use limited   tubing/devices free from skin contact     Problem: Adjustment to Illness (Heart Failure)  Goal: Optimal Coping  Outcome: Ongoing, Progressing  Intervention: Support Psychosocial Response  Recent Flowsheet Documentation  Taken 11/17/2022 0800 by Zully Swan RN  Supportive Measures:   active listening utilized   verbalization of feelings encouraged     Problem: Functional Ability Impaired (Heart Failure)  Goal: Optimal Functional Ability  Outcome: Ongoing, Progressing  Intervention: Optimize Functional Ability  Recent Flowsheet Documentation  Taken 11/17/2022 1800 by Zully Swan RN  Activity Management: activity adjusted per tolerance  Taken 11/17/2022 1600 by Zully Swan RN  Activity Management: activity adjusted per tolerance  Taken 11/17/2022 1400 by Zully Swan RN  Activity Management: activity adjusted per tolerance  Taken 11/17/2022 1200 by Zully Swan RN  Activity Management: activity adjusted per tolerance  Taken 11/17/2022 1000 by Zully Swan RN  Activity Management: activity adjusted per tolerance  Taken 11/17/2022 0800 by Zully Swan RN  Activity Management: activity adjusted per tolerance     Problem: Fluid Imbalance (Heart Failure)  Goal: Fluid Balance  Outcome: Ongoing, Progressing      Problem: Dysrhythmia (Heart Failure)  Goal: Stable Heart Rate and Rhythm  Outcome: Ongoing, Progressing

## 2022-11-17 NOTE — THERAPY TREATMENT NOTE
Patient Name: Jermaine Singh  : 1945    MRN: [id number]                              Today's Date: 2022       Admit Date: 2022    Visit Dx:     ICD-10-CM ICD-9-CM   1. Acute on chronic HFrEF (heart failure with reduced ejection fraction) ([facility])  I50.23 428.23   2. Unstable angina ([facility])  I20.0 411.1   3. Shortness of breath  R06.02 786.05   4. Pleural effusion  J90 511.9   5. History of chronic kidney disease  Z87.448 V13.09     Patient Active Problem List   Diagnosis   • Unstable angina (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  Preserved LV function   • Type 2 diabetes mellitus ([facility])   • Hypertension   • Hyperlipidemia   • Hypothyroidism (acquired)   • Vitamin D deficiency on Rx    • Diabetic neuropathy   • RBBB   • S/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 Au   • Atherosclerosis of native artery of both lower extremities with intermittent claudication ([facility])   • SSS (sick sinus syndrome) ([facility])   • Subacute osteomyelitis of left foot ([facility])   • Acute on chronic HFrEF (heart failure with reduced ejection fraction) ([facility])   • DVT, bilateral lower limbs ([facility])   • Elevated troponin   • Cellulitis of right lower extremity   • Obesity (BMI 30.0-34.9)   • Elevated serum creatinine   • Anemia, chronic disease   • Pleural effusion, bilateral   • PVD (peripheral vascular disease) ([facility])   • Status post amputation of great toe, left ([facility])   • Diabetic foot ulcer ([facility])   • Generalized weakness   • GERD without esophagitis   • CKD (chronic kidney disease) stage 3, GFR 30-59 ml/min ([facility])     Past Medical History:   Diagnosis Date   • Allergic    • Arthritis    • Asthma    • Coronary artery disease    • Diabetes mellitus ([facility])     started on [place] 2018; started on po meds in ; checking blood sugars daily    • Disease of thyroid gland     po meds daily for hypothyroidism    • History of fracture as a child     rt leg- severe    • Hyperlipidemia    • Hypertension    • Hypothyroidism    • Peripheral  neuropathy    • Peripheral vascular disease ([facility])     s/p angiogram 2/19-needs stent in left leg    • Proximal phalanx fracture of the second digit extending into the second metatarsal joint 7/28/2022   • RBBB    • Right knee pain    • Vitamin D deficiency      Past Surgical History:   Procedure Laterality Date   • APPENDECTOMY     • CARDIAC CATHETERIZATION N/A 2/14/2019    Procedure: Left Heart Cath;  Surgeon: Cooper Apodaca MD;  Location:  [facility] CATH INVASIVE LOCATION;  Service: Cardiology   • CARDIAC ELECTROPHYSIOLOGY PROCEDURE N/A 5/18/2022    Procedure: DEVICE IMPLANT;  Surgeon: Cooper Apodaca MD;  Location: [facility] CATH INVASIVE LOCATION;  Service: Cardiology;  Laterality: N/A;   • COLONOSCOPY     • CORONARY ARTERY BYPASS GRAFT N/A 3/22/2019    Procedure: MEDIAN STERNOTOMY, CORONARY ARTERY BYPASS GRAFT X3, UTILIZING THE LEFT INTERNAL MAMMARY ARTERY, EVH AND OPEN HARVEST OF THE RIGHT GREATER SAPHENOUS VEIN, EXPLORATION OF THE LEFT LEG;  Surgeon: Ap Au MD;  Location:  [facility];  Service: Cardiothoracic   • EYE SURGERY Bilateral     cataracts    • INTERVENTIONAL RADIOLOGY PROCEDURE N/A 7/29/2021    Procedure: Abdominal Aortagram with Runoff;  Surgeon: Jermaine Hernandez MD;  Location:  [facility] CATH INVASIVE LOCATION;  Service: Cardiovascular;  Laterality: N/A;   • KNEE ARTHROSCOPY      right x 2, left x 1   • LACERATION REPAIR      right leg   • LEG SURGERY      2 for fracture of rt leg    • TONSILLECTOMY      Adnoidectomy   • TRANS METATARSAL AMPUTATION Left 8/2/2022    Procedure: GREAT TOE AMPUTATION LEFT;  Surgeon: Gopal Márquez MD;  Location:  [facility];  Service: Vascular;  Laterality: Left;      General Information     Row Name 11/17/22 1436          OT Time and Intention    Document Type therapy note (daily note)  -ROESLIA     Mode of Treatment individual therapy;occupational therapy  -ROSELIA     Row Name 11/17/22 1436          General Information    Patient Profile Reviewed yes  -ROSELIA      Existing Precautions/Restrictions weight bearing  LLE WBAT with offloading shoe, L toe amputation 8/22  -ROSELIA     Barriers to Rehab previous functional deficit  -ROSELIA     Row Name 11/17/22 1436          Cognition    Orientation Status (Cognition) oriented to;person  -ROSELIA     Row Name 11/17/22 1436          Safety Issues, Functional Mobility    Safety Issues Affecting Function (Mobility) safety precaution awareness;safety precautions follow-through/compliance;sequencing abilities  -ROSELIA     Impairments Affecting Function (Mobility) balance;endurance/activity tolerance;strength;range of motion (ROM);sensation/sensory awareness  -ROSELIA           User Key  (r) = Recorded By, (t) = Taken By, (c) = Cosigned By    Initials Name Provider Type    Renae Morales, OT Occupational Therapist               Lymphedema     Row Name 11/16/22 1000             Lymphedema Edema Assessment    Ptting Edema Category By severity  -AB      Pitting Edema Mild  -AB      Recorded by [AB] Macrina Asher, PT              Skin Changes/Observations    Location/Assessment Lower Extremity  -AB      Lower Extremity Conditions bilateral:;dry;scab(s)  -AB      Lower Extremity Color/Pigment bilateral:;normal  -AB      Recorded by [AB] Macrina Asher, PT              Lymphedema Pulses/Capillary Refill    Lymphedema Pulses/Capillary Refill lower extremity pulses;capillary refill  -AB      Dorsalis Pedis Pulse right:;left:;+1 diminished  Edema present  -AB      Posterior Tibialis Pulse right:;left:;+1 diminished  Edema present  -AB      Capillary Refill lower extremity capillary refill  -AB      Lower Extremity Capillary Refill right:;left:;less than 3 seconds  -AB      Recorded by [AB] Macrina Asher, PT              Lymphedema Measurements    Measurement Type(s) Quick Girth  -AB      Quick Girth Areas Lower extremities  -AB      Recorded by [AB] Macrina Asher, PT              LLE Quick Girth (cm)    Mid foot 25 cm  -AB      Smallest ankle 23.5 cm  -AB       Largest calf 36.6 cm  -AB      Recorded by [AB] Macrina Asher, PT              RLE Quick Girth (cm)    Mid foot 26 cm  -AB      Smallest ankle 24.1 cm  -AB      Largest calf 39.4 cm  -AB      RLE Quick Girth Total 89.5  -AB      Recorded by [AB] Macrina Asher, PT              Compression/Skin Care    Compression/Skin Care skin care;wrapping location  -AB      Skin Care washed/dried;lotion applied  -AB      Wrapping Location lower extremity  -AB      Wrapping Location LE bilateral:;foot to knee  -AB      Wrapping Comments Compressogrips size 4/5 doubled and overlapping for gradient compression.  -AB      Recorded by [AB] Macrina Asher, PT            User Key  (r) = Recorded By, (t) = Taken By, (c) = Cosigned By    Initials Name Effective Dates    AB Macrina Asher, PT 09/22/22 -                Mobility/ADL's     Row Name 11/17/22 [facility]          Bed Mobility    Comment, (Bed Mobility) UIC on arrival  -     Row Name 11/17/22 [facility]7          Sit-Stand Transfer    Sit-Stand [place] (Transfers) contact guard  -ROSELIA     Comment, (Sit-Stand Transfer) cues for hand placement and not to plop into recliner  -     Row Name 11/17/22 [facility]          Stand-Sit Transfer    Stand-Sit [place] (Transfers) contact guard;verbal cues  -     Assistive Device (Stand-Sit Transfers) walker, front-wheeled  -     Row Name 11/17/22 [facility]7          Functional Mobility    Functional Mobility- Ind. Level contact guard assist  -     Functional Mobility-Distance (Feet) 40  -     Functional Mobility- Safety Issues step length decreased  -     Row Name 11/17/22 [facility]          Activities of Daily Living    BADL Assessment/Intervention grooming  -     Row Name 11/17/22 [facility]          Grooming Assessment/Training    [place] Level (Grooming) oral care regimen  -ROSELIA     Position (Grooming) sink side;supported standing;unsupported standing  -ROSELIA     Comment, (Grooming) pt. brushed teeth fair thoroughness and washed face, fatigued  and return to chair  -ROSELIA           User Key  (r) = Recorded By, (t) = Taken By, (c) = Cosigned By    Initials Name Provider Type    Renae Morales OT Occupational Therapist               Obj/Interventions     Row Name 11/17/22 1439          Shoulder (Therapeutic Exercise)    Shoulder AROM (Therapeutic Exercise) bilateral;flexion;extension;aBduction;aDduction;10 repetitions;sitting  RUE assisting LUE with shoulder flexion  -     Row Name 11/17/22 1439          Elbow/Forearm (Therapeutic Exercise)    Elbow/Forearm Strengthening (Therapeutic Exercise) bilateral;flexion;extension;sitting;10 repetitions  mild manual resistance  -     Row Name 11/17/22 1439          Motor Skills    Functional Endurance on room air, fatigues quickly in standing  -     Therapeutic Exercise shoulder;elbow/forearm  B knee F/E and hip flexion 10-20 reps  -[facility] Name 11/17/22 1439          Balance    Static Sitting Balance independent  -     Dynamic Sitting Balance independent  -ROSELIA     Position, Sitting Balance unsupported;sitting in chair  -ROSELIA     Static Standing Balance standby assist  -ROSELIA     Dynamic Standing Balance contact guard  -     Position/Device Used, Standing Balance walker, rolling;supported;unsupported  -ROSELIA     Balance Interventions sit to stand;occupation based/functional task  -ROSELIA     Comment, Balance grooming sinkside, UE and LE TE  -ROSELIA           User Key  (r) = Recorded By, (t) = Taken By, (c) = Cosigned By    Initials Name Provider Type    Renae Morales OT Occupational Therapist               Goals/Plan     [facility] Name 11/17/22 1444          Bed Mobility Goal 1 (OT)    Progress/Outcomes (Bed Mobility Goal 1, OT) goal ongoing  -     Row Name 11/17/22 1444          Transfer Goal 1 (OT)    Progress/Outcome (Transfer Goal 1, OT) goal ongoing  -ROSELIA     Row Name 11/17/22 1444          Toileting Goal 1 (OT)    Progress/Outcome (Toileting Goal 1, OT) goal ongoing  -[facility] Name 11/17/22 1444           Grooming Goal 1 (OT)    Progress/Outcome (Grooming Goal 1, OT) good progress toward goal  -ROSELIA     [facility] Name 11/17/22 1444          Problem Specific Goal 1 (OT)    Progress/Outcome (Problem Specific Goal 1, OT) good progress toward goal  -ROSELIA           User Key  (r) = Recorded By, (t) = Taken By, (c) = Cosigned By    Initials Name Provider Type    Renae Morales, OT Occupational Therapist               Clinical Impression     [facility] Name 11/17/22 1441          Pain Assessment    Pretreatment Pain Rating 0/10 - no pain  -ROSELIA     Posttreatment Pain Rating 0/10 - no pain  -[facility] Name 11/17/22 1441          Plan of Care Review    Plan of Care Reviewed With patient;spouse  -     Progress improving  -     Outcome Evaluation Pt. able to progress with mvmt. to sink today for standing grooming, but only tolerated grossly 2 minutes at sink.  Fair grooming thoroughness.  Good participation UE TE and was able to mobilize to sink and back 40 ft grossly. Educated pt. and wife how to slowly build strength and endurance at home.  Continue OT POC.  -ROSELIA     Row Name 11/17/22 1441          Therapy Assessment/Plan (OT)    Therapy Frequency (OT) daily  -ROSELIA     Row Name 11/17/22 1441          Therapy Plan Review/Discharge Plan (OT)    Anticipated Discharge Disposition (OT) inpatient rehabilitation facility  if pt. declines IRF recommend home with 24/7 support and HH therapy services progressing to OP therapy  -[facility] Name 11/17/22 1441          Vital Signs    Pre Systolic BP Rehab 121  -ROSELIA     Pre Treatment Diastolic BP 67  -ROSELIA     Post Systolic BP Rehab 113  -ROSELIA     Post Treatment Diastolic BP 62  -ROSELIA     Pretreatment Heart Rate (beats/min) 62  -ROSELIA     Posttreatment Heart Rate (beats/min) 61  -ROSELIA     O2 Delivery Pre Treatment room air  -ROSELIA     O2 Delivery Intra Treatment room air  -ROSELIA     Post SpO2 (%) 94  -ROSELIA     O2 Delivery Post Treatment room air  -ROSELIA     Pre Patient Position Supine  -ROSELIA     Intra Patient Position Standing   -ROSELIA     Post Patient Position Sitting  -ROSELIA     Row Name 11/17/22 1441          Positioning and Restraints    Pre-Treatment Position sitting in chair/recliner  -ROSELIA     Post Treatment Position chair  -ROSELIA     In Chair reclined;call light within reach;encouraged to call for assist;with family/caregiver;waffle cushion  no alarm on arrival  -ROSELIA           User Key  (r) = Recorded By, (t) = Taken By, (c) = Cosigned By    Initials Name Provider Type    Renae Morales OT Occupational Therapist               Outcome Measures     Row Name 11/17/22 1445          How much help from another is currently needed...    Putting on and taking off regular lower body clothing? 2  -ROSELIA     Bathing (including washing, rinsing, and drying) 2  -ROSELIA     Toileting (which includes using toilet bed pan or urinal) 3  -ROSELIA     Putting on and taking off regular upper body clothing 3  -ROSELIA     Taking care of personal grooming (such as brushing teeth) 4  sitting  -ROSELIA     Eating meals 4  -ROSELIA     AM-PAC 6 Clicks Score (OT) 18  -ROSELIA     Row Name 11/17/22 1445          Functional Assessment    Outcome Measure Options AM-PAC 6 Clicks Daily Activity (OT)  -ROSELIA           User Key  (r) = Recorded By, (t) = Taken By, (c) = Cosigned By    Initials Name Provider Type    Renae Morales OT Occupational Therapist                Occupational Therapy Education     Title: PT OT SLP Therapies (In Progress)     Topic: Occupational Therapy (In Progress)     Point: ADL training (Done)     Description:   Instruct learner(s) on proper safety adaptation and remediation techniques during self care or transfers.   Instruct in proper use of assistive devices.              Learning Progress Summary           Patient Acceptance, E,D, VU,NR by ROSELIA at 11/17/2022 1445    Comment: transfer safety/technique, UE TE, ways to build strength and endurance slowly at home, BADL progression    Acceptance, E, VU by ROSELIA at 11/15/2022 1156    Comment: reason for consult, benefit of therapy    Family Acceptance, E,D, VU,NR by ROSELIA at 11/17/2022 1445    Comment: transfer safety/technique, UE TE, ways to build strength and endurance slowly at home, BADL progression                   Point: Home exercise program (Done)     Description:   Instruct learner(s) on appropriate technique for monitoring, assisting and/or progressing therapeutic exercises/activities.              Learning Progress Summary           Patient Acceptance, E,D, VU,NR by ROSELIA at 11/17/2022 1445    Comment: transfer safety/technique, UE TE, ways to build strength and endurance slowly at home, BADL progression    Acceptance, E,D, VU,NR by ROSELIA at 11/16/2022 1451    Comment: UE and LE TE to support BADL independence and related transfers   Family Acceptance, E,D, VU,NR by  at 11/17/2022 1445    Comment: transfer safety/technique, UE TE, ways to build strength and endurance slowly at home, BADL progression    Acceptance, E,D, VU,NR by  at 11/16/2022 1451    Comment: UE and LE TE to support BADL independence and related transfers                   Point: Precautions (Done)     Description:   Instruct learner(s) on prescribed precautions during self-care and functional transfers.              Learning Progress Summary           Patient Acceptance, E,D, VU,NR by  at 11/17/2022 1445    Comment: transfer safety/technique, UE TE, ways to build strength and endurance slowly at home, BADL progression   Family Acceptance, E,D, VU,NR by ROSELIA at 11/17/2022 1445    Comment: transfer safety/technique, UE TE, ways to build strength and endurance slowly at home, BADL progression                   Point: Body mechanics (Not Started)     Description:   Instruct learner(s) on proper positioning and spine alignment during self-care, functional mobility activities and/or exercises.              Learner Progress:  Not documented in this visit.                      User Key     Initials Effective Dates Name Provider Type Discipline     06/16/21 -  Renae Rosa  [facility] [place] Occupational Therapist OT              OT Recommendation and Plan  Planned Therapy Interventions (OT): activity tolerance training, BADL retraining, functional balance retraining, occupation/activity based interventions, ROM/therapeutic exercise, strengthening exercise, patient/caregiver education/training, transfer/mobility retraining  Therapy Frequency (OT): daily  Plan of Care Review  Plan of Care Reviewed With: patient, spouse  Progress: improving  Outcome Evaluation: Pt. able to progress with mvmt. to sink today for standing grooming, but only tolerated grossly 2 minutes at sink.  Fair grooming thoroughness.  Good participation UE TE and was able to mobilize to sink and back 40 ft grossly. Educated pt. and wife how to slowly build strength and endurance at home.  Continue OT POC.     Time Calculation:    Time Calculation- OT     Row Name 11/17/22 1446             Time Calculation- OT    OT Start Time 1408  -ROSELIA      OT Received On 11/17/22  -ROSELIA      OT Goal Re-Cert Due Date 11/25/22  -ROSELIA         Timed Charges    90169 - OT Therapeutic Exercise Minutes 9  -ROSELIA      39737 - OT Therapeutic Activity Minutes 9  -ROSELIA      58995 - OT Self Care/Mgmt Minutes 8  -ROSELIA         Total Minutes    Timed Charges Total Minutes 26  -ROSELIA       Total Minutes 26  -ROSELIA            User Key  (r) = Recorded By, (t) = Taken By, (c) = Cosigned By    Initials Name Provider Type    Renae Morales OT Occupational Therapist              Therapy Charges for Today     Code Description Service Date Service Provider Modifiers Qty    82471365480 HC OT THER PROC EA 15 MIN 11/16/2022 Renae Rosa OT GO 1    15287267511 HC OT THER PROC EA 15 MIN 11/17/2022 Renae Rosa OT GO 1    33862825989 HC OT THERAPEUTIC ACT EA 15 MIN 11/17/2022 Renae Rosa OT GO 1               Renae Rosa OT  11/17/2022

## 2022-11-17 NOTE — PLAN OF CARE
Goal Outcome Evaluation:  Plan of Care Reviewed With: patient, spouse        Progress: improving  Outcome Evaluation: Pt. able to progress with mvmt. to sink today for standing grooming, but only tolerated grossly 2 minutes at sink.  Fair grooming thoroughness.  Good participation UE TE and was able to mobilize to sink and back 40 ft grossly. Educated pt. and wife how to slowly build strength and endurance at home.  Continue OT POC.

## 2022-11-18 LAB
GLUCOSE BLDC GLUCOMTR-MCNC: 134 MG/DL (ref 70–130)
GLUCOSE BLDC GLUCOMTR-MCNC: 188 MG/DL (ref 70–130)
GLUCOSE BLDC GLUCOMTR-MCNC: 212 MG/DL (ref 70–130)

## 2022-11-18 PROCEDURE — 99232 SBSQ HOSP IP/OBS MODERATE 35: CPT | Performed by: INTERNAL MEDICINE

## 2022-11-18 PROCEDURE — 63710000001 INSULIN LISPRO (HUMAN) PER 5 UNITS: Performed by: NURSE PRACTITIONER

## 2022-11-18 PROCEDURE — 29581 APPL MULTLAYER CMPRN SYS LEG: CPT

## 2022-11-18 PROCEDURE — 82962 GLUCOSE BLOOD TEST: CPT

## 2022-11-18 PROCEDURE — 97597 DBRDMT OPN WND 1ST 20 CM/<: CPT

## 2022-11-18 RX ORDER — BUMETANIDE 1 MG/1
1 TABLET ORAL 2 TIMES DAILY
Qty: 60 TABLET | Refills: 6 | Status: SHIPPED | OUTPATIENT
Start: 2022-11-18

## 2022-11-18 RX ADMIN — INSULIN LISPRO 3 UNITS: 100 INJECTION, SOLUTION INTRAVENOUS; SUBCUTANEOUS at 12:20

## 2022-11-18 RX ADMIN — SACUBITRIL AND VALSARTAN 1 TABLET: 24; 26 TABLET, FILM COATED ORAL at 21:11

## 2022-11-18 RX ADMIN — DOCUSATE SODIUM 100 MG: 100 CAPSULE, LIQUID FILLED ORAL at 12:20

## 2022-11-18 RX ADMIN — TAMSULOSIN HYDROCHLORIDE 0.4 MG: 0.4 CAPSULE ORAL at 09:09

## 2022-11-18 RX ADMIN — SENNOSIDES AND DOCUSATE SODIUM 1 TABLET: 50; 8.6 TABLET ORAL at 09:08

## 2022-11-18 RX ADMIN — SACUBITRIL AND VALSARTAN 1 TABLET: 24; 26 TABLET, FILM COATED ORAL at 09:08

## 2022-11-18 RX ADMIN — FLUTICASONE PROPIONATE 2 SPRAY: 50 SPRAY, METERED NASAL at 09:09

## 2022-11-18 RX ADMIN — Medication 10 ML: at 21:15

## 2022-11-18 RX ADMIN — DOXYCYCLINE 100 MG: 100 CAPSULE ORAL at 06:42

## 2022-11-18 RX ADMIN — APIXABAN 5 MG: 5 TABLET, FILM COATED ORAL at 09:09

## 2022-11-18 RX ADMIN — ATORVASTATIN CALCIUM 20 MG: 20 TABLET, FILM COATED ORAL at 21:10

## 2022-11-18 RX ADMIN — DOXYCYCLINE 100 MG: 100 CAPSULE ORAL at 17:04

## 2022-11-18 RX ADMIN — INSULIN LISPRO 2 UNITS: 100 INJECTION, SOLUTION INTRAVENOUS; SUBCUTANEOUS at 17:04

## 2022-11-18 RX ADMIN — CARVEDILOL 3.12 MG: 3.12 TABLET, FILM COATED ORAL at 17:04

## 2022-11-18 RX ADMIN — NYSTATIN: 100000 POWDER TOPICAL at 21:15

## 2022-11-18 RX ADMIN — CARVEDILOL 3.12 MG: 3.12 TABLET, FILM COATED ORAL at 09:08

## 2022-11-18 RX ADMIN — APIXABAN 5 MG: 5 TABLET, FILM COATED ORAL at 21:10

## 2022-11-18 RX ADMIN — Medication 1 CAPSULE: at 09:09

## 2022-11-18 RX ADMIN — Medication 10 ML: at 09:09

## 2022-11-18 RX ADMIN — ASPIRIN 81 MG CHEWABLE TABLET 81 MG: 81 TABLET CHEWABLE at 09:08

## 2022-11-18 RX ADMIN — FAMOTIDINE 20 MG: 20 TABLET ORAL at 09:08

## 2022-11-18 RX ADMIN — DOCUSATE SODIUM 100 MG: 100 CAPSULE, LIQUID FILLED ORAL at 21:11

## 2022-11-18 RX ADMIN — NYSTATIN: 100000 POWDER TOPICAL at 12:22

## 2022-11-18 RX ADMIN — BUMETANIDE 1 MG: 1 TABLET ORAL at 21:10

## 2022-11-18 RX ADMIN — BUMETANIDE 1 MG: 1 TABLET ORAL at 09:09

## 2022-11-18 NOTE — PROGRESS NOTES
Psychiatric Medicine Services  PROGRESS NOTE    Patient Name: Jermaine Singh  : 1945  MRN: [id number]    Date of Admission: 2022  Primary Care Physician: Mj Kennedy DO    Subjective   Subjective     CC:  SOA    HPI:  Resting in a chair asleep but arousable.  Patient's wife is also present at the bedside.  Complains of severe weakness and difficulty with ambulation.  No fever or chills.  ROS:  Gen- No fevers, chills  CV- No chest pain, palpitations  Resp- No cough, +dyspnea  GI- No N/V/D, abd pain        Objective   Objective     Vital Signs:   Temp:  [97.3 °F (36.3 °C)-98.2 °F (36.8 °C)] 98.2 °F (36.8 °C)  Heart Rate:  [62-83] 62  Resp:  [18] 18  BP: ()/(32-85) 106/72  Flow (L/min):  [2] 2     Physical Exam:  Constitutional: No acute distress  HENT: NCAT, mucous membranes moist  Respiratory: Clear to auscultation bilaterally, respiratory effort normal  Cardiovascular: RRR, no murmurs, rubs, or gallops  Gastrointestinal: Positive bowel sounds, soft, nontender, nondistended  Musculoskeletal: 1-2+pitting edema bilateral LE's  Psychiatric: Appropriate affect, cooperative  Neurologic: Awake, alert, oriented x3, no focality appreciated, speech clear  Skin: No rashes      Results Reviewed:  LAB RESULTS:      Lab 11/15/22  0507 11/15/22  0001 22   WBC 5.92  --  6.42   HEMOGLOBIN 10.5*  --  10.9*   HEMATOCRIT 33.3*  --  34.7*   PLATELETS 154  --  166   NEUTROS ABS 3.95  --  4.38   IMMATURE GRANS (ABS) 0.02  --  0.01   LYMPHS ABS 1.05  --  1.12   MONOS ABS 0.74  --  0.75   EOS ABS 0.13  --  0.12   MCV 91.7  --  91.6   SED RATE  --   --  8   CRP  --  0.67*  --    PROCALCITONIN  --  0.06  --    LACTATE  --   --  1.3         Lab 22  0545 22  0423 11/15/22  0507 226 22  1155   SODIUM 144 140 141 142 141   POTASSIUM 4.8 4.3 4.4 5.1 4.7   CHLORIDE 104 102 104 103 101   CO2 30.0* 31.0* 25.0 29.0 28.4   ANION GAP 10.0 7.0 12.0 10.0 11.6    BUN 41* 42* 43* 43* 43*   CREATININE 1.69* 1.68* 1.70* 1.95* 1.60*   EGFR 41.3* 41.6* 41.0* 34.8* 44.1*   GLUCOSE 130* 167* 157* 177* 201*   CALCIUM 8.4* 8.7 9.0 9.3 9.4   MAGNESIUM 1.8 1.9 2.0  --   --    PHOSPHORUS  --  3.8  --   --   --    HEMOGLOBIN A1C  --   --  7.30*  --   --    TSH  --   --  8.020*  --   --          Lab 11/14/22 2036   TOTAL PROTEIN 6.4   ALBUMIN 3.80   GLOBULIN 2.6   ALT (SGPT) 55*   AST (SGOT) 23   BILIRUBIN 0.3   ALK PHOS 168*   LIPASE 10*         Lab 11/16/22  0423 11/15/22  0507 11/15/22  0001 11/14/22 2036 11/14/22  1155   PROBNP 23,101.0*  --   --  26,381.0* 23,706.0*   TROPONIN T  --  0.071* 0.076* 0.074*  --                  Brief Urine Lab Results  (Last result in the past 365 days)      Color   Clarity   Blood   Leuk Est   Nitrite   Protein   CREAT   Urine HCG        11/15/22 1003 Yellow   Cloudy   Moderate (2+)   Moderate (2+)   Negative   Negative                 Microbiology Results Abnormal     Procedure Component Value - Date/Time    Respiratory Panel PCR w/COVID-19(SARS-CoV-2) [facility] In-House, NP Swab in UTM/VTM, 3-4 HR TAT - Swab, Nasopharynx [[id number]]  (Normal) Collected: 11/15/22 0352    Lab Status: Final result Specimen: Swab from Nasopharynx Updated: 11/15/22 2359     ADENOVIRUS, PCR Not Detected     Coronavirus 229E Not Detected     Coronavirus HKU1 Not Detected     Coronavirus NL63 Not Detected     Coronavirus OC43 Not Detected     COVID19 Not Detected     Human Metapneumovirus Not Detected     Human Rhinovirus/Enterovirus Not Detected     Influenza A PCR Not Detected     Influenza B PCR Not Detected     Parainfluenza Virus 1 Not Detected     Parainfluenza Virus 2 Not Detected     Parainfluenza Virus 3 Not Detected     Parainfluenza Virus 4 Not Detected     RSV, PCR Not Detected     Bordetella pertussis pcr Not Detected     Bordetella parapertussis PCR Not Detected     Chlamydophila pneumoniae PCR Not Detected     Mycoplasma pneumo by PCR Not  Detected    Narrative:      In the setting of a positive respiratory panel with a viral infection PLUS a negative procalcitonin without other underlying concern for bacterial infection, consider observing off antibiotics or discontinuation of antibiotics and continue supportive care. If the respiratory panel is positive for atypical bacterial infection (Bordetella pertussis, Chlamydophila pneumoniae, or Mycoplasma pneumoniae), consider antibiotic de-escalation to target atypical bacterial infection.          No radiology results from the last 24 hrs    Results for orders placed during the hospital encounter of 07/27/22    Adult Transthoracic Echo Complete W/ Cont if Necessary Per Protocol    Interpretation Summary  · Left ventricular ejection fraction appears to be 46 - 50%. Left ventricular systolic function is low normal.  · Left ventricular septal hypokinesis  · No significant valvular heart disease      I have reviewed the medications:  Scheduled Meds:apixaban, 5 mg, Oral, Q12H  aspirin, 324 mg, Oral, Once  aspirin, 81 mg, Oral, Daily  atorvastatin, 20 mg, Oral, Nightly  bumetanide, 1 mg, Oral, BID  carvedilol, 3.125 mg, Oral, BID With Meals  docusate sodium, 100 mg, Oral, BID  doxycycline, 100 mg, Oral, Q12H  famotidine, 20 mg, Oral, Daily  fluticasone, 2 spray, Each Nare, Daily  insulin lispro, 0-7 Units, Subcutaneous, TID AC  lactobacillus acidophilus, 1 capsule, Oral, Daily  levothyroxine, 88 mcg, Oral, Q AM  nystatin, , Topical, Q12H  pharmacy consult - MTM, , Does not apply, Daily  sacubitril-valsartan, 1 tablet, Oral, BID  sennosides-docusate, 1 tablet, Oral, Daily  sodium chloride, 10 mL, Intravenous, Q12H  tamsulosin, 0.4 mg, Oral, Daily      Continuous Infusions:   PRN Meds:.•  acetaminophen  •  dextrose  •  dextrose  •  glucagon (human recombinant)  •  hydrOXYzine  •  sodium chloride  •  sodium chloride    Assessment & Plan   Assessment & Plan     Active Hospital Problems    Diagnosis  POA   • **Acute  on chronic HFrEF (heart failure with reduced ejection fraction) ([facility]) [I50.23]  Yes   • Elevated serum creatinine [R79.89]  Yes   • Anemia, chronic disease [D63.8]  Yes   • Pleural effusion, bilateral [J90]  Yes   • PVD (peripheral vascular disease) ([facility]) [I73.9]  Yes   • Status post amputation of great toe, left ([facility]) [Z89.412]  Not Applicable   • Diabetic foot ulcer ([facility]) [E11.621, L97.509]  Yes   • Generalized weakness [R53.1]  Yes   • GERD without esophagitis [K21.9]  Yes   • CKD (chronic kidney disease) stage 3, GFR 30-59 ml/min ([facility]) [N18.30]  Yes   • Elevated troponin [R77.8]  Yes   • SSS (sick sinus syndrome) ([facility]) [I49.5]  Yes   • Type 2 diabetes mellitus ([facility]) [E11.9]  Yes   • Hypertension [I10]  Yes   • Hyperlipidemia [E78.5]  Yes   • Hypothyroidism (acquired) [E03.9]  Yes   • S/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 Au [Z95.1]  Not Applicable      Resolved Hospital Problems   No resolved problems to display.        Brief Hospital Course to date:  Jermaine Singh is a 77 y.o. male     with a PMH significant for systolic CHF, CAD s/p CABG, HTN, HLD, SSS s/p pacemaker, history of DVT on Eliquis, CKD, diabetes mellitus type 2, hypothyroidism, PVD, chronic anemia, GERD, diabetic foot ulcers status post left great toe amputation 8/2022 who comes to the ED due to shortness of breath.  Patient was recently hospitalized at [facility] where he was treated for CHF exacerbation.  He was discharged to [facility] rehab.  Wife at bedside reports progressive weakness, lower extremity edema, chills, chest pain, SOB.  Patient weighed 230 lbs at discharge from [facility] rehab, weight increased to 248 after d/c home.  Dr. Apodaca changed his diuretic regimen, he currently is weighing 240 pounds but feels like he is retaining fluid.         **Acute/chronic systolic CHF exacerbation   **Bilateral pleural effusions   **Elevated troponin   **CAD (s/p CABG)   **HTN, HLD   **SSS (s/p PPM)   -Cardiology/Dr. Apodaca following.  Stopped hydralazine.  Recs continue entresto and coreg.  S/p diuresis with IV bumex chmaged to bumex 1mg PO BID on 11/16/22  -CT with bilateral pleural effusions and adjacent consolidation, likely atelectasis.  Pneumonia not excluded.  Monitor for signs of infection, low threshold to broaden antibiotic coverage  -proBNP >26,000  -troponin appears chronically elevated  -last ECHO 8/22 w/ EF 46-50%  -recent Lexiscan 10/3 that showed anteroseptal scar with no significant ischemia. LVEF 42%     **Diabetic foot ulcer (2nd toe left foot)   **S/p left great toe amputation (8/2/22)   **PVD   -pt follows w/ Dr. Márquez and Dr. Braga w/ ID; consult PRN   -on Doxycyline BID through February; continued   -CRP mildly elevated  -s/p debridement, leg wraps per PT wound care     **Elevated creatinine   **CKD Stage III  -baseline CR ~1.6-1.7  -current CR 1.69     **Generalized weakness  -pt,ot and case mgt consult   -Both patient and wife tell me that patient has major difficulty with ambulation and at home has minimal mobility.  They think they are agreeable to go to rehab if pain is possible.     **BLE DVTs (dx 8/22)   -continue Eliquis      **T2DM  -hem a1c 7.3  -holding routine novolog and Levemir (8 units)   -FSBG TID w/ LDSS     **Hypothyroidism   -increase levothyroxine from 75mcg daily to 88mcg daily     **GERD  -continue Pepcid      **Depression      Expected Discharge Location and Transportation: home with home health versus rehab  Expected Discharge Date:  11/19/22    DVT prophylaxis:  Medical DVT prophylaxis orders are present.     AM-PAC 6 Clicks Score (PT): 19 (11/17/22 0800)    CODE STATUS:   Code Status and Medical Interventions:   Ordered at: 11/15/22 0221     Level Of Support Discussed With:    Health Care Surrogate     Code Status (Patient has no pulse and is not breathing):    CPR (Attempt to Resuscitate)     Medical Interventions (Patient has pulse or is breathing):    Full Support       Charlie Whelan  MD  11/18/22

## 2022-11-18 NOTE — CASE MANAGEMENT/SOCIAL WORK
Continued Stay Note   [place]     Patient Name: Jermaine Singh  MRN: [id number]  Today's Date: 11/18/2022    Admit Date: 11/14/2022    Plan: Home with [facility] SN/PT/OT   Discharge Plan     Row Name 11/18/22 1434       Plan    Plan Home with [facility] SN/PT/OT    Patient/Family in Agreement with Plan yes    Plan Comments Spoke with patient at bedside and spouse at bedside. Plan is home with [facility] SN/PT/OT. Patient is current with [facility] at admission. I discussed patient rehab option. Patient and wife wanted more information on cardiac rehab. I called cardiac rehab and they were gone to call the patient to update them. No other discharge needs at this time. CM will follow.    Final Discharge Disposition Code 06 - home with home health care               Discharge Codes    No documentation.               Expected Discharge Date and Time     Expected Discharge Date Expected Discharge Time    Nov 19, 2022             Yolanda Adams RN

## 2022-11-18 NOTE — PLAN OF CARE
Goal Outcome Evaluation:  Plan of Care Reviewed With: patient        Progress: improving  Outcome Evaluation: Pt demonstrates improved BLE edema, significantly at dorsum of R foot. Pt was not wearing MLWs this date and was unsure when they were removed. Education provided on importance of adhering to wear schedule in order to prevent reoccurrence of swelling. Pt continues to have scattered dry/crusted blisters with no new occurances noted. PT was able to debride small amounts of nonviable tissue in order to encourage reepithelialization. Light MLWs applied in order to promote venous return and improve skin integrity. Pt will continue to benefit from skilled IPPT wound care. Anticipated wrap change in 2-3 days.

## 2022-11-18 NOTE — THERAPY WOUND CARE TREATMENT
Acute Care - Wound/Debridement Treatment Note  [facility]     Patient Name: Jermaine Singh  : 1945  MRN: [id number]  Today's Date: 2022                Admit Date: 2022    Visit Dx:    ICD-10-CM ICD-9-CM   1. Acute on chronic HFrEF (heart failure with reduced ejection fraction) ([facility])  I50.23 428.23   2. Unstable angina ([facility])  I20.0 411.1   3. Shortness of breath  R06.02 786.05   4. Pleural effusion  J90 511.9   5. 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  Preserved LV function   • Type 2 diabetes mellitus ([facility])   • Hypertension   • Hyperlipidemia   • Hypothyroidism (acquired)   • Vitamin D deficiency on Rx    • Diabetic neuropathy   • RBBB   • S/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 Au   • Atherosclerosis of native artery of both lower extremities with intermittent claudication ([facility])   • SSS (sick sinus syndrome) ([facility])   • Subacute osteomyelitis of left foot ([facility])   • Acute on chronic HFrEF (heart failure with reduced ejection fraction) ([facility])   • DVT, bilateral lower limbs ([facility])   • Elevated troponin   • Cellulitis of right lower extremity   • Obesity (BMI 30.0-34.9)   • Elevated serum creatinine   • Anemia, chronic disease   • Pleural effusion, bilateral   • PVD (peripheral vascular disease) ([facility])   • Status post amputation of great toe, left ([facility])   • Diabetic foot ulcer ([facility])   • Generalized weakness   • GERD without esophagitis   • CKD (chronic kidney disease) stage 3, GFR 30-59 ml/min ([facility])        Past Medical History:   Diagnosis Date   • Allergic    • Arthritis    • Asthma    • Coronary artery disease    • Diabetes mellitus ([facility])     started on [place] 2018; started on po meds in ; checking blood sugars daily    • Disease of thyroid gland     po meds daily for hypothyroidism    • History of fracture as a child     rt leg- severe    • Hyperlipidemia    •  Hypertension    • Hypothyroidism    • Peripheral neuropathy    • Peripheral vascular disease ([facility])     s/p angiogram 2/19-needs stent in left leg    • Proximal phalanx fracture of the second digit extending into the second metatarsal joint 7/28/2022   • RBBB    • Right knee pain    • Vitamin D deficiency         Past Surgical History:   Procedure Laterality Date   • APPENDECTOMY     • CARDIAC CATHETERIZATION N/A 2/14/2019    Procedure: Left Heart Cath;  Surgeon: Cooper Apodaca MD;  Location:  [facility];  Service: Cardiology   • CARDIAC ELECTROPHYSIOLOGY PROCEDURE N/A 5/18/2022    Procedure: DEVICE IMPLANT;  Surgeon: Cooper Apodaca MD;  Location: [facility]IVE [facility];  Service: Cardiology;  Laterality: N/A;   • COLONOSCOPY     • CORONARY ARTERY BYPASS GRAFT N/A 3/22/2019    Procedure: MEDIAN STERNOTOMY, CORONARY ARTERY BYPASS GRAFT X3, UTILIZING THE LEFT INTERNAL MAMMARY ARTERY, EVH AND OPEN HARVEST OF THE RIGHT GREATER SAPHENOUS VEIN, EXPLORATION OF THE LEFT LEG;  Surgeon: Ap Au MD;  Location:  [facility] OR;  Service: Cardiothoracic   • EYE SURGERY Bilateral     cataracts    • INTERVENTIONAL RADIOLOGY PROCEDURE N/A 7/29/2021    Procedure: Abdominal Aortagram with Runoff;  Surgeon: Jermaine Hernandez MD;  Location:  [facility];  Service: Cardiovascular;  Laterality: N/A;   • KNEE ARTHROSCOPY      right x 2, left x 1   • LACERATION REPAIR      right leg   • LEG SURGERY      2 for fracture of rt leg    • TONSILLECTOMY      Adnoidectomy   • TRANS METATARSAL AMPUTATION Left 8/2/2022    Procedure: GREAT TOE AMPUTATION LEFT;  Surgeon: Gopal Márquez MD;  Location:  [facility];  Service: Vascular;  Laterality: Left;           Wound 08/02/22 1023 Left anterior great toe (Active)   Dressing Appearance dry;intact 11/18/22 0845   Closure HARMEET 11/18/22 0845   Base dressing in place, unable to visualize 11/18/22 0845   Dressing Care dressing applied 11/17/22 1957        Wound 11/15/22 0653 Left posterior greater trochanter Other (comment) (Active)   Dressing Appearance open to air 11/17/22 1957   Care, Wound cleansed with 11/17/22 1957   Dressing Care open to air 11/17/22 1957       Wound 11/15/22 0654 Right lower leg Skin Tear (Active)   Dressing Appearance open to air 11/18/22 0845   Closure HARMEET 11/17/22 1957       Wound 11/16/22 0901 Left anterior knee Abrasion (Active)   Dressing Appearance dry;intact 11/18/22 0845   Closure HARMEET 11/18/22 0845   Base dressing in place, unable to visualize 11/18/22 0845      Lymphedema     Row Name 11/18/22 0900             Lymphedema Edema Assessment    Ptting Edema Category By severity  -AB      Pitting Edema Mild  -AB         Lymphedema Pulses/Capillary Refill    Lymphedema Pulses/Capillary Refill lower extremity pulses;capillary refill  -AB      Dorsalis Pedis Pulse right:;left:;+1 diminished  edema present  -AB      Posterior Tibialis Pulse right:;left:;+1 diminished  edema present  -AB      Capillary Refill lower extremity capillary refill  -AB      Lower Extremity Capillary Refill right:;left:;less than 3 seconds  -AB         Compression/Skin Care    Compression/Skin Care skin care;wrapping location  -AB      Skin Care washed/dried;lotion applied  -AB      Wrapping Location lower extremity  -AB      Wrapping Location LE bilateral:;foot to knee  -AB      Wrapping Comments Compressogrips size 4/5 doubled and overlapping for gradient compression  -AB            User Key  (r) = Recorded By, (t) = Taken By, (c) = Cosigned By    Initials Name Provider Type    AB Macrina Asher, PT Physical Therapist                WOUND DEBRIDEMENT  Total area of Debridement: ~4cm2  Debridement Site 1  Location- Site 1: BLE  Selective Debridement- Site 1: Wound Surface <20cmsq  Instruments- Site 1: tweezers  Excised Tissue Description- Site 1: minimum, slough (scab)  Bleeding- Site 1: none               PT Assessment (last 12 hours)     PT Evaluation and  Treatment     Row Name 11/18/22 0845          Physical Therapy Time and Intention    Subjective Information no complaints  -AB     Document Type wound care;therapy note (daily note)  -AB     Mode of Treatment individual therapy;physical therapy  -AB     Symptoms Noted During/After Treatment none  -AB     Row Name 11/18/22 0845          General Information    Patient Profile Reviewed yes  -AB     Patient Observations alert;cooperative;agree to therapy  -AB     Existing Precautions/Restrictions weight bearing  LLE WBAT with offloading shoe, L toe amputation 8/22  -AB     Row Name 11/18/22 0845          Pain    Pretreatment Pain Rating 0/10 - no pain  -AB     Posttreatment Pain Rating 0/10 - no pain  -AB     Row Name 11/18/22 0845          Cognition    Orientation Status (Cognition) oriented to;person;place  -AB     Row Name             Wound 11/15/22 0653 Left posterior greater trochanter Other (comment)    Wound - Properties Group Placement Date: 11/15/22  - Placement Time: 0653  -BH Side: Left  -BH Orientation: posterior  -BH Location: greater trochanter  -BH Primary Wound Type: Other  -BH, open sore     Retired Wound - Properties Group Placement Date: 11/15/22  - Placement Time: 0653  -BH Side: Left  -BH Orientation: posterior  -BH Location: greater trochanter  -BH Primary Wound Type: Other  -BH, open sore     Retired Wound - Properties Group Date first assessed: 11/15/22  - Time first assessed: 0653  -BH Side: Left  -BH Location: greater trochanter  -BH Primary Wound Type: Other  -BH, open sore     Row Name 11/18/22 0845          Wound 11/15/22 0654 Right lower leg Skin Tear    Wound - Properties Group Placement Date: 11/15/22  - Placement Time: 0654  -BH Side: Right  -BH Orientation: lower  -BH Location: leg  -BH Primary Wound Type: Skin tear  -BH    Dressing Appearance open to air  -AB     Retired Wound - Properties Group Placement Date: 11/15/22  - Placement Time: 0654  -BH Side: Right  -BH  Orientation: lower  -BH Location: leg  -BH Primary Wound Type: Skin tear  -BH    Retired Wound - Properties Group Date first assessed: 11/15/22  -BH Time first assessed: 0654  -BH Side: Right  -BH Location: leg  -BH Primary Wound Type: Skin tear  -BH    Row Name 11/18/22 0845          Wound 08/02/22 1023 Left anterior great toe    Wound - Properties Group Placement Date: 08/02/22  -JR Placement Time: 1023  -JR Present on Hospital Admission: N  -JR Side: Left  -JR Orientation: anterior  -JR Location: great toe  -JR    Dressing Appearance dry;intact  -AB     Closure HARMEET  -AB     Base dressing in place, unable to visualize  -AB     Retired Wound - Properties Group Placement Date: 08/02/22  -JR Placement Time: 1023  -JR Present on Hospital Admission: N  -JR Side: Left  -JR Orientation: anterior  -JR Location: great toe  -JR    Retired Wound - Properties Group Date first assessed: 08/02/22  -JR Time first assessed: 1023  -JR Present on Hospital Admission: N  -JR Side: Left  -JR Location: great toe  -JR    Row Name 11/18/22 0845          Wound 11/16/22 0901 Left anterior knee Abrasion    Wound - Properties Group Placement Date: 11/16/22  - Placement Time: 0901  - Side: Left  - Orientation: anterior  -MC Location: knee  -MC Primary Wound Type: Abrasion  -MC    Dressing Appearance dry;intact  -AB     Closure HARMEET  -AB     Base dressing in place, unable to visualize  -AB     Retired Wound - Properties Group Placement Date: 11/16/22  - Placement Time: 0901  - Side: Left  - Orientation: anterior  -MC Location: knee  -MC Primary Wound Type: Abrasion  -MC    Retired Wound - Properties Group Date first assessed: 11/16/22  - Time first assessed: 0901  - Side: Left  -MC Location: knee  -MC Primary Wound Type: Abrasion  -MC    Row Name 11/18/22 0845          Coping    Observed Emotional State calm;cooperative  -AB     Verbalized Emotional State acceptance  -AB     Trust Relationship/Rapport care explained;questions  answered  -AB     Row Name 11/18/22 0845          Plan of Care Review    Plan of Care Reviewed With patient  -AB     Progress improving  -AB     Outcome Evaluation Pt demonstrates improved BLE edema, significantly at dorsum of R foot. Pt was not wearing MLWs this date and was unsure when they were removed. Education provided on importance of adhering to wear schedule in order to prevent reoccurrence of swelling. Pt continues to have scattered dry/crusted blisters with no new occurances noted. PT was able to debride small amounts of nonviable tissue in order to encourage reepithelialization. Light MLWs applied in order to promote venous return and improve skin integrity. Pt will continue to benefit from skilled IPPT wound care. Anticipated wrap change in 2-3 days.  -AB     Row Name 11/18/22 0845          Positioning and Restraints    Pre-Treatment Position sitting in chair/recliner  -AB     Post Treatment Position chair  -AB     In Chair notified nsg;reclined;sitting;call light within reach;exit alarm on;encouraged to call for assist  -AB           User Key  (r) = Recorded By, (t) = Taken By, (c) = Cosigned By    Initials Name Provider Type    Marilu Moran RN Registered Nurse     Vivian De La Rosa RN Registered Nurse    AB Macrina Asher, PT Physical Therapist    Guerline Huerta RN Registered Nurse              Physical Therapy Education     Title: PT OT SLP Therapies (In Progress)     Topic: Physical Therapy (In Progress)     Point: Mobility training (Done)     Learning Progress Summary           Patient Acceptance, E, VU,NR by NS at 11/15/2022 1203                   Point: Home exercise program (Not Started)     Learner Progress:  Not documented in this visit.          Point: Body mechanics (Done)     Learning Progress Summary           Patient Acceptance, E, VU,NR by NS at 11/15/2022 1203                   Point: Precautions (Done)     Learning Progress Summary           Patient Acceptance, E,  VU,NR by NS at 11/15/2022 1203                               User Key     Initials Effective Dates Name Provider Type Discipline    NS 06/16/21 -  Suha Noland, PT Physical Therapist PT                Recommendation and Plan  Anticipated Discharge Disposition (PT): inpatient rehabilitation facility  Planned Therapy Interventions (PT): wound care  Therapy Frequency (PT): daily  Plan of Care Reviewed With: patient   Progress: improving       Progress: improving  Outcome Evaluation: Pt demonstrates improved BLE edema, significantly at dorsum of R foot. Pt was not wearing MLWs this date and was unsure when they were removed. Education provided on importance of adhering to wear schedule in order to prevent reoccurrence of swelling. Pt continues to have scattered dry/crusted blisters with no new occurances noted. PT was able to debride small amounts of nonviable tissue in order to encourage reepithelialization. Light MLWs applied in order to promote venous return and improve skin integrity. Pt will continue to benefit from skilled IPPT wound care. Anticipated wrap change in 2-3 days.  Plan of Care Reviewed With: patient            Time Calculation   PT Charges     Row Name 11/18/22 0915             Time Calculation    Start Time 0845  -AB      PT Goal Re-Cert Due Date 11/25/22  -AB         Untimed Charges    84806-Tjultpkvlx comp below knee 15  -AB      70921-Stgydbwmq debridement 5  -AB         Total Minutes    Untimed Charges Total Minutes 20  -AB       Total Minutes 20  -AB            User Key  (r) = Recorded By, (t) = Taken By, (c) = Cosigned By    Initials Name Provider Type    AB Macrina Asher, PT Physical Therapist                  Therapy Charges for Today     Code Description Service Date Service Provider Modifiers Qty    91993803673 HC ROXANNE DEBRIDE OPEN WOUND UP TO 20CM 11/18/2022 Macrina Asher, PT GP 1    [id number]  PT MULTI LAYER COMP SYS BELOW KNEE 11/18/2022 Macrina Asher, PT GP 1            PT  G-Codes  Outcome Measure Options: AM-PAC 6 Clicks Daily Activity (OT)  AM-PAC 6 Clicks Score (PT): 19  AM-PAC 6 Clicks Score (OT): 18       Macrina Asher, PT  11/18/2022

## 2022-11-18 NOTE — PLAN OF CARE
Goal Outcome Evaluation:           Progress: improving  VSS, 2L, V-paced on tele. No complaints of pain or SOA. Sitting up in chair most of shift. Will continue to monitor.

## 2022-11-18 NOTE — PLAN OF CARE
Goal Outcome Evaluation:  Plan of Care Reviewed With: patient        Progress: improving  Outcome Evaluation: Patient rested this shift. Vitals stable. Patient SBP in low 100 prior to evening bumex and entresto. SBP in 90's after medications.

## 2022-11-18 NOTE — PROGRESS NOTES
" [facility] - Progress Note    Jermaine Singh  1945  [id number]    11/18/22, 08:54 EST      Chief Complaint: Following for chronic HFrEF    Subjective:   Sitting up in chair eating breakfast.  Slept well last night.  Feels good - wants to go home.      Review of Systems:  Pertinent positives are listed above and in physical exam.  All others have been reviewed and are negative.    apixaban, 5 mg, Oral, Q12H  aspirin, 324 mg, Oral, Once  aspirin, 81 mg, Oral, Daily  atorvastatin, 20 mg, Oral, Nightly  bumetanide, 1 mg, Oral, BID  carvedilol, 3.125 mg, Oral, BID With Meals  docusate sodium, 100 mg, Oral, BID  doxycycline, 100 mg, Oral, Q12H  famotidine, 20 mg, Oral, Daily  fluticasone, 2 spray, Each Nare, Daily  insulin lispro, 0-7 Units, Subcutaneous, TID AC  lactobacillus acidophilus, 1 capsule, Oral, Daily  levothyroxine, 88 mcg, Oral, Q AM  nystatin, , Topical, Q12H  pharmacy consult - MTM, , Does not apply, Daily  sacubitril-valsartan, 1 tablet, Oral, BID  sennosides-docusate, 1 tablet, Oral, Daily  sodium chloride, 10 mL, Intravenous, Q12H  tamsulosin, 0.4 mg, Oral, Daily        Objective:  Vitals:   height is 190.5 cm (75\") and weight is 105 kg (232 lb 3.2 oz). His oral temperature is 97.8 °F (36.6 °C). His blood pressure is 82/32 (abnormal) and his pulse is 83. His respiration is 18 and oxygen saturation is 98%.       Intake/Output Summary (Last 24 hours) at 11/18/2022 0854  Last data filed at 11/18/2022 0700  Gross per 24 hour   Intake 1275 ml   Output --   Net 1275 ml       Physical Exam:  General:  WN, NAD, A and O x3.  CV:  Normal S1,S2. No murmur, rub, or gallop.  Resp:  CTA Soy, equal, nonlabored.  Abd:  Soft, + BS, no organomegaly. Nontender to palpation.  Extrem:  Trace edema BLE, 2+ pedal/PT pulses.            Results from last 7 days   Lab Units 11/15/22  0507   WBC 10*3/mm3 5.92   HEMOGLOBIN g/dL 10.5*   HEMATOCRIT % 33.3*   PLATELETS 10*3/mm3 154     Results from last 7 days   Lab " Units 11/17/22  0545 11/15/22  0507 11/14/22  2036   SODIUM mmol/L 144   < > 142   POTASSIUM mmol/L 4.8   < > 5.1   CHLORIDE mmol/L 104   < > 103   CO2 mmol/L 30.0*   < > 29.0   BUN mg/dL 41*   < > 43*   CREATININE mg/dL 1.69*   < > 1.95*   CALCIUM mg/dL 8.4*   < > 9.3   BILIRUBIN mg/dL  --   --  0.3   ALK PHOS U/L  --   --  168*   ALT (SGPT) U/L  --   --  55*   AST (SGOT) U/L  --   --  23   GLUCOSE mg/dL 130*   < > 177*    < > = values in this interval not displayed.         Results from last 7 days   Lab Units 11/15/22  0507   TSH uIU/mL 8.020*         Results from last 7 days   Lab Units 11/16/22  0423   PROBNP pg/mL 23,101.0*       Tele:  NSR    ASSESSMENT:  -  Chronic Systolic Heart Failure with subacute decompensation. Echo 8/11/22 reviewed:  EF 46-50%, LV septal HK, no significant valvular dz.   -  CAD with previous 3V CABG 2/2019  -  Chronic non specific troponin elevation without rise/fall.  Nuclear MPS 10/3/22:  No isotope mismatch defects, no ischemia noted.  Study consistent with previous ant wall MI.  -  SSS with [facility]  -  PVDz s/p fairly recent Left great toe amputation  -  BLE DVT 8/2022 on NOAC  -  CKDz  -  HTN  -  Hypothyroidism with abnormal TSH  -  Depression          PLAN:  -  Overall, patient appears to be fairly well compensated.  His numbers (proBNP, Cr, troponin) are not far from baseline.  -   Continue 1mg Bumex PO BID.  Continue strict I/Os, daily wts.  Repeat proBNP and BMP reviewed.  -  DC'd Hydralazine.   Continue Entresto, Continue Coreg with adjusted timing.  -  Patient appears clinically depressed, which has been a historic issue with multiple medical conditions/hospitalizations.  Will defer to primary service to address.  -  Continue PT/OT.  Patient needs to be up out of bed, ambulating daily.  Will share patient concerns with Case Management for discharge needs.  -  Cardiology will sign off.  Okay for discharge with PT/OT arranged through Case Management.  We will follow up  as previously scheduled - November 30th 4p.m with [facility] check.        I discussed the patient's findings and my recommendations with the patient, any present family members, and the nursing staff.  Cooper Apodaca MD saw and examined patient, verified hx and PE, read all radiographic studies, reviewed labs and micro data, and formulated dx, plan for treatment and all medical decision making.      Megan Shukla PA-C  11/18/22, 08:57 EST

## 2022-11-19 LAB
GLUCOSE BLDC GLUCOMTR-MCNC: 145 MG/DL (ref 70–130)
GLUCOSE BLDC GLUCOMTR-MCNC: 202 MG/DL (ref 70–130)
GLUCOSE BLDC GLUCOMTR-MCNC: 219 MG/DL (ref 70–130)

## 2022-11-19 PROCEDURE — 82962 GLUCOSE BLOOD TEST: CPT

## 2022-11-19 PROCEDURE — 99233 SBSQ HOSP IP/OBS HIGH 50: CPT | Performed by: INTERNAL MEDICINE

## 2022-11-19 PROCEDURE — 63710000001 INSULIN LISPRO (HUMAN) PER 5 UNITS: Performed by: NURSE PRACTITIONER

## 2022-11-19 RX ADMIN — NYSTATIN: 100000 POWDER TOPICAL at 20:44

## 2022-11-19 RX ADMIN — APIXABAN 5 MG: 5 TABLET, FILM COATED ORAL at 20:44

## 2022-11-19 RX ADMIN — INSULIN LISPRO 3 UNITS: 100 INJECTION, SOLUTION INTRAVENOUS; SUBCUTANEOUS at 13:31

## 2022-11-19 RX ADMIN — LEVOTHYROXINE SODIUM 88 MCG: 88 TABLET ORAL at 08:41

## 2022-11-19 RX ADMIN — NYSTATIN 1 APPLICATION: 100000 POWDER TOPICAL at 08:43

## 2022-11-19 RX ADMIN — SACUBITRIL AND VALSARTAN 1 TABLET: 24; 26 TABLET, FILM COATED ORAL at 08:42

## 2022-11-19 RX ADMIN — SACUBITRIL AND VALSARTAN 1 TABLET: 24; 26 TABLET, FILM COATED ORAL at 20:43

## 2022-11-19 RX ADMIN — APIXABAN 5 MG: 5 TABLET, FILM COATED ORAL at 08:42

## 2022-11-19 RX ADMIN — ASPIRIN 81 MG CHEWABLE TABLET 81 MG: 81 TABLET CHEWABLE at 08:41

## 2022-11-19 RX ADMIN — TAMSULOSIN HYDROCHLORIDE 0.4 MG: 0.4 CAPSULE ORAL at 08:41

## 2022-11-19 RX ADMIN — Medication 1 CAPSULE: at 08:41

## 2022-11-19 RX ADMIN — DOCUSATE SODIUM 100 MG: 100 CAPSULE, LIQUID FILLED ORAL at 08:41

## 2022-11-19 RX ADMIN — INSULIN LISPRO 3 UNITS: 100 INJECTION, SOLUTION INTRAVENOUS; SUBCUTANEOUS at 17:33

## 2022-11-19 RX ADMIN — BUMETANIDE 1 MG: 1 TABLET ORAL at 08:42

## 2022-11-19 RX ADMIN — FAMOTIDINE 20 MG: 20 TABLET ORAL at 08:42

## 2022-11-19 RX ADMIN — CARVEDILOL 3.12 MG: 3.12 TABLET, FILM COATED ORAL at 17:33

## 2022-11-19 RX ADMIN — Medication 10 ML: at 08:43

## 2022-11-19 RX ADMIN — DOXYCYCLINE 100 MG: 100 CAPSULE ORAL at 17:33

## 2022-11-19 RX ADMIN — SENNOSIDES AND DOCUSATE SODIUM 1 TABLET: 50; 8.6 TABLET ORAL at 08:42

## 2022-11-19 RX ADMIN — BUMETANIDE 1 MG: 1 TABLET ORAL at 20:44

## 2022-11-19 RX ADMIN — Medication 10 ML: at 20:44

## 2022-11-19 RX ADMIN — ATORVASTATIN CALCIUM 20 MG: 20 TABLET, FILM COATED ORAL at 20:44

## 2022-11-19 RX ADMIN — CARVEDILOL 3.12 MG: 3.12 TABLET, FILM COATED ORAL at 08:41

## 2022-11-19 RX ADMIN — ACETAMINOPHEN 650 MG: 325 TABLET, FILM COATED ORAL at 08:41

## 2022-11-19 RX ADMIN — DOCUSATE SODIUM 100 MG: 100 CAPSULE, LIQUID FILLED ORAL at 20:44

## 2022-11-19 NOTE — PLAN OF CARE
Goal Outcome Evaluation:  Plan of Care Reviewed With: patient        Progress: no change  Outcome Evaluation: Vitals stable. Patient denies pain. RA while awake and patient on 2L NC while sleeping.

## 2022-11-19 NOTE — PROGRESS NOTES
[facility] Medicine Services  PROGRESS NOTE    Patient Name: Jermaine Singh  : 1945  MRN: [id number]    Date of Admission: 2022  Primary Care Physician: Mj Kennedy DO    Subjective   Subjective     CC:  SOA    HPI:  Resting in a chair no acute distress and tells me that he feels much better than yesterday.  Patient's wife is also present at the bedside.  Complains of severe weakness and difficulty with ambulation.  No fever or chills.  ROS:  Gen- No fevers, chills  CV- No chest pain, palpitations  Resp- No cough, +dyspnea  GI- No N/V/D, abd pain        Objective   Objective     Vital Signs:   Temp:  [97.7 °F (36.5 °C)-98.4 °F (36.9 °C)] 98.2 °F (36.8 °C)  Heart Rate:  [60-73] 68  Resp:  [18] 18  BP: (103-123)/(61-77) 103/63  Flow (L/min):  [2] 2     Physical Exam:  Constitutional: No acute distress  HENT: NCAT, mucous membranes moist  Respiratory: Clear to auscultation bilaterally, respiratory effort normal  Cardiovascular: RRR, no murmurs, rubs, or gallops  Gastrointestinal: Positive bowel sounds, soft, nontender, nondistended  Musculoskeletal: 1+pitting edema bilateral LE's  Psychiatric: Appropriate affect, cooperative  Neurologic: Awake, alert, oriented x3, no focality appreciated, speech clear  Skin: No rashes      Results Reviewed:  LAB RESULTS:      Lab 11/15/22  0507 11/15/22  0001 22   WBC 5.92  --  6.42   HEMOGLOBIN 10.5*  --  10.9*   HEMATOCRIT 33.3*  --  34.7*   PLATELETS 154  --  166   NEUTROS ABS 3.95  --  4.38   IMMATURE GRANS (ABS) 0.02  --  0.01   LYMPHS ABS 1.05  --  1.12   MONOS ABS 0.74  --  0.75   EOS ABS 0.13  --  0.12   MCV 91.7  --  91.6   SED RATE  --   --  8   CRP  --  0.67*  --    PROCALCITONIN  --  0.06  --    LACTATE  --   --  1.3         Lab 22  0545 22  0423 11/15/22  0507 226 22  1155   SODIUM 144 140 141 142 141   POTASSIUM 4.8 4.3 4.4 5.1 4.7   CHLORIDE 104 102 104 103 101   CO2 30.0* 31.0*  25.0 29.0 28.4   ANION GAP 10.0 7.0 12.0 10.0 11.6   BUN 41* 42* 43* 43* 43*   CREATININE 1.69* 1.68* 1.70* 1.95* 1.60*   EGFR 41.3* 41.6* 41.0* 34.8* 44.1*   GLUCOSE 130* 167* 157* 177* 201*   CALCIUM 8.4* 8.7 9.0 9.3 9.4   MAGNESIUM 1.8 1.9 2.0  --   --    PHOSPHORUS  --  3.8  --   --   --    HEMOGLOBIN A1C  --   --  7.30*  --   --    TSH  --   --  8.020*  --   --          Lab 11/14/22 2036   TOTAL PROTEIN 6.4   ALBUMIN 3.80   GLOBULIN 2.6   ALT (SGPT) 55*   AST (SGOT) 23   BILIRUBIN 0.3   ALK PHOS 168*   LIPASE 10*         Lab 11/16/22  0423 11/15/22  0507 11/15/22  0001 11/14/22 2036 11/14/22  1155   PROBNP 23,101.0*  --   --  26,381.0* 23,706.0*   TROPONIN T  --  0.071* 0.076* 0.074*  --                  Brief Urine Lab Results  (Last result in the past 365 days)      Color   Clarity   Blood   Leuk Est   Nitrite   Protein   CREAT   Urine HCG        11/15/22 1003 Yellow   Cloudy   Moderate (2+)   Moderate (2+)   Negative   Negative                 Microbiology Results Abnormal     Procedure Component Value - Date/Time    Respiratory Panel PCR w/COVID-19(SARS-CoV-2) [facility]IN/[facility]/MAD/DEEP In-House, NP Swab in UTM/[facility], 3-4 HR TAT - Swab, Nasopharynx [[id number]]  (Normal) Collected: 11/15/22 1165    Lab Status: Final result Specimen: Swab from Nasopharynx Updated: 11/15/22 2197     ADENOVIRUS, PCR Not Detected     Coronavirus 229E Not Detected     Coronavirus HKU1 Not Detected     Coronavirus NL63 Not Detected     Coronavirus OC43 Not Detected     COVID19 Not Detected     Human Metapneumovirus Not Detected     Human Rhinovirus/Enterovirus Not Detected     Influenza A PCR Not Detected     Influenza B PCR Not Detected     Parainfluenza Virus 1 Not Detected     Parainfluenza Virus 2 Not Detected     Parainfluenza Virus 3 Not Detected     Parainfluenza Virus 4 Not Detected     RSV, PCR Not Detected     Bordetella pertussis pcr Not Detected     Bordetella parapertussis PCR Not Detected     Chlamydophila  pneumoniae PCR Not Detected     Mycoplasma pneumo by PCR Not Detected    Narrative:      In the setting of a positive respiratory panel with a viral infection PLUS a negative procalcitonin without other underlying concern for bacterial infection, consider observing off antibiotics or discontinuation of antibiotics and continue supportive care. If the respiratory panel is positive for atypical bacterial infection (Bordetella pertussis, Chlamydophila pneumoniae, or Mycoplasma pneumoniae), consider antibiotic de-escalation to target atypical bacterial infection.          No radiology results from the last 24 hrs    Results for orders placed during the hospital encounter of 07/27/22    Adult Transthoracic Echo Complete W/ Cont if Necessary Per Protocol    Interpretation Summary  · Left ventricular ejection fraction appears to be 46 - 50%. Left ventricular systolic function is low normal.  · Left ventricular septal hypokinesis  · No significant valvular heart disease      I have reviewed the medications:  Scheduled Meds:apixaban, 5 mg, Oral, Q12H  aspirin, 324 mg, Oral, Once  aspirin, 81 mg, Oral, Daily  atorvastatin, 20 mg, Oral, Nightly  bumetanide, 1 mg, Oral, BID  carvedilol, 3.125 mg, Oral, BID With Meals  docusate sodium, 100 mg, Oral, BID  doxycycline, 100 mg, Oral, Q12H  famotidine, 20 mg, Oral, Daily  fluticasone, 2 spray, Each Nare, Daily  insulin lispro, 0-7 Units, Subcutaneous, TID AC  lactobacillus acidophilus, 1 capsule, Oral, Daily  levothyroxine, 88 mcg, Oral, Q AM  nystatin, , Topical, Q12H  pharmacy consult - [place], , Does not apply, Daily  sacubitril-valsartan, 1 tablet, Oral, BID  sennosides-docusate, 1 tablet, Oral, Daily  sodium chloride, 10 mL, Intravenous, Q12H  tamsulosin, 0.4 mg, Oral, Daily      Continuous Infusions:   PRN Meds:.•  acetaminophen  •  dextrose  •  dextrose  •  glucagon (human recombinant)  •  hydrOXYzine  •  sodium chloride  •  sodium chloride    Assessment & Plan   Assessment &  Plan     Active Hospital Problems    Diagnosis  POA   • **Acute on chronic HFrEF (heart failure with reduced ejection fraction) ([facility]) [I50.23]  Yes   • Elevated serum creatinine [R79.89]  Yes   • Anemia, chronic disease [D63.8]  Yes   • Pleural effusion, bilateral [J90]  Yes   • PVD (peripheral vascular disease) ([facility]) [I73.9]  Yes   • Status post amputation of great toe, left ([facility]) [Z89.412]  Not Applicable   • Diabetic foot ulcer ([facility]) [E11.621, L97.509]  Yes   • Generalized weakness [R53.1]  Yes   • GERD without esophagitis [K21.9]  Yes   • CKD (chronic kidney disease) stage 3, GFR 30-59 ml/min ([facility]) [N18.30]  Yes   • Elevated troponin [R77.8]  Yes   • SSS (sick sinus syndrome) ([facility]) [I49.5]  Yes   • Type 2 diabetes mellitus ([facility]) [E11.9]  Yes   • Hypertension [I10]  Yes   • Hyperlipidemia [E78.5]  Yes   • Hypothyroidism (acquired) [E03.9]  Yes   • S/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 Au [Z95.1]  Not Applicable      Resolved Hospital Problems   No resolved problems to display.        Brief Hospital Course to date:  Jermaine Singh is a 77 y.o. male     with a PMH significant for systolic CHF, CAD s/p CABG, HTN, HLD, SSS s/p pacemaker, history of DVT on Eliquis, CKD, diabetes mellitus type 2, hypothyroidism, PVD, chronic anemia, GERD, diabetic foot ulcers status post left great toe amputation 8/2022 who comes to the ED due to shortness of breath.  Patient was recently hospitalized at [facility] where he was treated for CHF exacerbation.  He was discharged to [facility] rehab.  Wife at bedside reports progressive weakness, lower extremity edema, chills, chest pain, SOB.  Patient weighed 230 lbs at discharge from [facility] rehab, weight increased to 248 after d/c home.  Dr. Apodaca changed his diuretic regimen, he currently is weighing 240 pounds but feels like he is retaining fluid.         **Acute/chronic systolic CHF exacerbation   **Bilateral pleural effusions   **Elevated troponin   **CAD (s/p CABG)   **HTN,  HLD   **SSS (s/p PPM)   -Cardiology/Dr. Apodaca following. Stopped hydralazine.  Recs continue entresto and coreg.  S/p diuresis with IV bumex chmaged to bumex 1mg PO BID on 11/16/22  -CT with bilateral pleural effusions and adjacent consolidation, likely atelectasis.  Pneumonia not excluded.  Monitor for signs of infection, low threshold to broaden antibiotic coverage  -troponin appears chronically elevated  -last ECHO 8/22 w/ EF 46-50%  -recent Lexiscan 10/3 that showed anteroseptal scar with no significant ischemia. LVEF 42%  -Talk with the patient and his wife extensively at bedside.  They need to follow-up at heart failure clinic.  We will consult heart failure navigator before discharge.     **Diabetic foot ulcer (2nd toe left foot)   **S/p left great toe amputation (8/2/22)   **PVD   -pt follows w/ Dr. Márquez and Dr. Braga w/ ID; consult PRN   -on Doxycyline BID through February; continued   -CRP mildly elevated  -s/p debridement, leg wraps per PT wound care     **Elevated creatinine   **CKD Stage III  -baseline CR ~1.6-1.7  - CR on 11/17/2022 was 1.69     **Generalized weakness  -pt,ot and case mgt consult   -Both patient and wife tell me that patient has major difficulty with ambulation and at home has minimal mobility.  However, they think they can go home if home health could provide physical therapy.        **BLE DVTs (dx 8/22)   -continue Eliquis      **T2DM  -hem a1c 7.3  -holding routine novolog and Levemir (8 units)   -FSBG TID w/ LDSS     **Hypothyroidism   -increase levothyroxine from 75mcg daily to 88mcg daily     **GERD  -continue Pepcid      **Depression      Expected Discharge Location and Transportation: home with home health versus rehab  Expected Discharge Date:  11/19/22    DVT prophylaxis:  Medical DVT prophylaxis orders are present.     AM-PAC 6 Clicks Score (PT): 19 (11/17/22 0800)    CODE STATUS:   Code Status and Medical Interventions:   Ordered at: 11/15/22 0224     Level Of Support  Discussed With:    Health Care Surrogate     Code Status (Patient has no pulse and is not breathing):    CPR (Attempt to Resuscitate)     Medical Interventions (Patient has pulse or is breathing):    Full Support       Charlie Whelan MD  11/19/22

## 2022-11-20 ENCOUNTER — READMISSION MANAGEMENT (OUTPATIENT)
Dept: CALL CENTER | Facility: HOSPITAL | Age: 77
End: 2022-11-20

## 2022-11-20 VITALS
RESPIRATION RATE: 18 BRPM | SYSTOLIC BLOOD PRESSURE: 116 MMHG | WEIGHT: 226 LBS | DIASTOLIC BLOOD PRESSURE: 63 MMHG | TEMPERATURE: 97.8 F | BODY MASS INDEX: 28.1 KG/M2 | OXYGEN SATURATION: 94 % | HEIGHT: 75 IN | HEART RATE: 64 BPM

## 2022-11-20 LAB
ANION GAP SERPL CALCULATED.3IONS-SCNC: 9 MMOL/L (ref 5–15)
BUN SERPL-MCNC: 43 MG/DL (ref 8–23)
BUN/CREAT SERPL: 26.2 (ref 7–25)
CALCIUM SPEC-SCNC: 9 MG/DL (ref 8.6–10.5)
CHLORIDE SERPL-SCNC: 103 MMOL/L (ref 98–107)
CO2 SERPL-SCNC: 29 MMOL/L (ref 22–29)
CREAT SERPL-MCNC: 1.64 MG/DL (ref 0.76–1.27)
EGFRCR SERPLBLD CKD-EPI 2021: 42.8 ML/MIN/1.73
GLUCOSE BLDC GLUCOMTR-MCNC: 145 MG/DL (ref 70–130)
GLUCOSE BLDC GLUCOMTR-MCNC: 172 MG/DL (ref 70–130)
GLUCOSE SERPL-MCNC: 153 MG/DL (ref 65–99)
POTASSIUM SERPL-SCNC: 4.6 MMOL/L (ref 3.5–5.2)
SODIUM SERPL-SCNC: 141 MMOL/L (ref 136–145)

## 2022-11-20 PROCEDURE — 29581 APPL MULTLAYER CMPRN SYS LEG: CPT

## 2022-11-20 PROCEDURE — 80048 BASIC METABOLIC PNL TOTAL CA: CPT | Performed by: INTERNAL MEDICINE

## 2022-11-20 PROCEDURE — 97530 THERAPEUTIC ACTIVITIES: CPT

## 2022-11-20 PROCEDURE — 97597 DBRDMT OPN WND 1ST 20 CM/<: CPT

## 2022-11-20 PROCEDURE — 63710000001 INSULIN LISPRO (HUMAN) PER 5 UNITS: Performed by: NURSE PRACTITIONER

## 2022-11-20 PROCEDURE — 99239 HOSP IP/OBS DSCHRG MGMT >30: CPT | Performed by: INTERNAL MEDICINE

## 2022-11-20 PROCEDURE — 82962 GLUCOSE BLOOD TEST: CPT

## 2022-11-20 PROCEDURE — 97110 THERAPEUTIC EXERCISES: CPT

## 2022-11-20 PROCEDURE — 97116 GAIT TRAINING THERAPY: CPT

## 2022-11-20 RX ORDER — SACUBITRIL AND VALSARTAN 24; 26 MG/1; MG/1
1 TABLET, FILM COATED ORAL 2 TIMES DAILY
Qty: 60 TABLET | Refills: 0 | Status: SHIPPED | OUTPATIENT
Start: 2022-11-20

## 2022-11-20 RX ORDER — LEVOTHYROXINE SODIUM 88 UG/1
88 TABLET ORAL
Qty: 30 TABLET | Refills: 0 | Status: SHIPPED | OUTPATIENT
Start: 2022-11-21 | End: 2022-12-14 | Stop reason: SDUPTHER

## 2022-11-20 RX ADMIN — APIXABAN 5 MG: 5 TABLET, FILM COATED ORAL at 08:31

## 2022-11-20 RX ADMIN — Medication 10 ML: at 08:32

## 2022-11-20 RX ADMIN — DOXYCYCLINE 100 MG: 100 CAPSULE ORAL at 05:16

## 2022-11-20 RX ADMIN — NYSTATIN 1 APPLICATION: 100000 POWDER TOPICAL at 08:33

## 2022-11-20 RX ADMIN — CARVEDILOL 3.12 MG: 3.12 TABLET, FILM COATED ORAL at 08:31

## 2022-11-20 RX ADMIN — DOCUSATE SODIUM 100 MG: 100 CAPSULE, LIQUID FILLED ORAL at 08:31

## 2022-11-20 RX ADMIN — INSULIN LISPRO 4 UNITS: 100 INJECTION, SOLUTION INTRAVENOUS; SUBCUTANEOUS at 08:32

## 2022-11-20 RX ADMIN — FLUTICASONE PROPIONATE 2 SPRAY: 50 SPRAY, METERED NASAL at 08:33

## 2022-11-20 RX ADMIN — SACUBITRIL AND VALSARTAN 1 TABLET: 24; 26 TABLET, FILM COATED ORAL at 08:31

## 2022-11-20 RX ADMIN — Medication 1 CAPSULE: at 08:31

## 2022-11-20 RX ADMIN — TAMSULOSIN HYDROCHLORIDE 0.4 MG: 0.4 CAPSULE ORAL at 08:31

## 2022-11-20 RX ADMIN — LEVOTHYROXINE SODIUM 88 MCG: 88 TABLET ORAL at 05:16

## 2022-11-20 RX ADMIN — ASPIRIN 81 MG CHEWABLE TABLET 81 MG: 81 TABLET CHEWABLE at 08:31

## 2022-11-20 RX ADMIN — FAMOTIDINE 20 MG: 20 TABLET ORAL at 08:31

## 2022-11-20 RX ADMIN — BUMETANIDE 1 MG: 1 TABLET ORAL at 08:31

## 2022-11-20 NOTE — OUTREACH NOTE
Prep Survey    Flowsheet Row Responses   [facility] patient discharged from? [place]   Is LACE score < 7 ? Non-BH Discharge   Emergency Room discharge w/ pulse ox? No   Eligibility [facility]   Date of Admission 11/14/22   Date of Discharge 11/20/22   Discharge Disposition Home or Self Care   Discharge diagnosis Acute on chronic HFrEF (heart failure with reduced ejection fraction   Does the patient have one of the following disease processes/diagnoses(primary or secondary)? CHF   Does the patient have Home health ordered? Yes   What is the Home health agency?  [facility] for SN/PT/OT   Is there a DME ordered? No   Prep survey completed? Yes          MARKO CHERY - Registered Nurse

## 2022-11-20 NOTE — PLAN OF CARE
Goal Outcome Evaluation:  Plan of Care Reviewed With: patient        Progress: improving  Outcome Evaluation: Pt able to walk further but did have one LOB needing min A to regain balance. Spouse called at end of session; they want [facility]. CM notified.

## 2022-11-20 NOTE — CASE MANAGEMENT/SOCIAL WORK
Continued Stay Note   [place]     Patient Name: Jermaine Singh  MRN: [id number]  Today's Date: 11/20/2022    Admit Date: 11/14/2022    Plan: Home with [facility] for SN/PT/OT   Discharge Plan     Row Name 11/20/22 0941       Plan    Plan Home with [facility] for SN/PT/OT    Patient/Family in Agreement with Plan yes    Plan Comments Spoke with patient at bedside. We discussed discharge planning and rehabilitation services. Given list of other resources that may help. They may want a . They would have to private pay. Patient discharge plan is home with [facility] for Skilled Nursing, Physical Therapy and Occupational Therapy. We discussed what to expect with home health services and how they were to help aide patient.    Final Discharge Disposition Code 06 - home with home health care               Discharge Codes    No documentation.               Expected Discharge Date and Time     Expected Discharge Date Expected Discharge Time    Nov 19, 2022             Yolanda Adams RN

## 2022-11-20 NOTE — THERAPY TREATMENT NOTE
Patient Name: Jermaine Singh  : 1945    MRN: [id number]                              Today's Date: 2022       Admit Date: 2022    Visit Dx:     ICD-10-CM ICD-9-CM   1. Acute on chronic HFrEF (heart failure with reduced ejection fraction) ([facility])  I50.23 428.23   2. Unstable angina ([facility])  I20.0 411.1   3. Shortness of breath  R06.02 786.05   4. Pleural effusion  J90 511.9   5. History of chronic kidney disease  Z87.448 V13.09   6. Vitamin D deficiency on Rx   E55.9 268.9   7. Hypothyroidism (acquired)  E03.9 244.9   8. PVD (peripheral vascular disease) ([facility])  I73.9 443.9   9. Status post amputation of great toe, left ([facility])  Z89.412 V49.71   10. Anemia, chronic disease  D63.8 285.29   11. Pleural effusion, bilateral  J90 511.9   12. Cellulitis of right lower extremity  L03.115 682.6   13. Subacute osteomyelitis of left foot ([facility])  M86.272 730.07   14. Atherosclerosis of native artery of both lower extremities with intermittent claudication ([facility])  I70.213 440.21   15. 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  Preserved LV function   • Type 2 diabetes mellitus ([facility])   • Hypertension   • Hyperlipidemia   • Hypothyroidism (acquired)   • Vitamin D deficiency on Rx    • Diabetic neuropathy   • RBBB   • S/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 Au   • Atherosclerosis of native artery of both lower extremities with intermittent claudication ([facility])   • SSS (sick sinus syndrome) ([facility])   • Subacute osteomyelitis of left foot ([facility])   • Acute on chronic HFrEF (heart failure with reduced ejection fraction) ([facility])   • DVT, bilateral lower limbs ([facility])   • Elevated troponin   • Cellulitis of right lower extremity   • Obesity (BMI 30.0-34.9)   • Elevated serum creatinine   • Anemia, chronic disease   • Pleural effusion, bilateral   • PVD (peripheral vascular disease) ([facility])   • Status post amputation of great toe, left  ([facility])   • Diabetic foot ulcer ([facility])   • Generalized weakness   • GERD without esophagitis   • CKD (chronic kidney disease) stage 3, GFR 30-59 ml/min ([facility])     Past Medical History:   Diagnosis Date   • Allergic    • Arthritis    • Asthma    • Coronary artery disease    • Diabetes mellitus ([facility]) 2000    started on inuslin 12/2018; started on po meds in 2000; checking blood sugars daily    • Disease of thyroid gland     po meds daily for hypothyroidism    • History of fracture as a child     rt leg- severe    • Hyperlipidemia    • Hypertension    • Hypothyroidism    • Peripheral neuropathy    • Peripheral vascular disease ([facility])     s/p angiogram 2/19-needs stent in left leg    • Proximal phalanx fracture of the second digit extending into the second metatarsal joint 7/28/2022   • RBBB    • Right knee pain    • Vitamin D deficiency      Past Surgical History:   Procedure Laterality Date   • APPENDECTOMY     • CARDIAC CATHETERIZATION N/A 2/14/2019    Procedure: Left Heart Cath;  Surgeon: Cooper Apodaca MD;  Location:  [facility] CATH INVASIVE LOCATION;  Service: Cardiology   • CARDIAC ELECTROPHYSIOLOGY PROCEDURE N/A 5/18/2022    Procedure: DEVICE IMPLANT;  Surgeon: Cooper Apodaca MD;  Location: [facility] CATH INVASIVE LOCATION;  Service: Cardiology;  Laterality: N/A;   • COLONOSCOPY     • CORONARY ARTERY BYPASS GRAFT N/A 3/22/2019    Procedure: MEDIAN STERNOTOMY, CORONARY ARTERY BYPASS GRAFT X3, UTILIZING THE LEFT INTERNAL MAMMARY ARTERY, EVH AND OPEN HARVEST OF THE RIGHT GREATER SAPHENOUS VEIN, EXPLORATION OF THE LEFT LEG;  Surgeon: Ap Au MD;  Location:  [facility];  Service: Cardiothoracic   • EYE SURGERY Bilateral     cataracts    • INTERVENTIONAL RADIOLOGY PROCEDURE N/A 7/29/2021    Procedure: Abdominal Aortagram with Runoff;  Surgeon: Jermaine Hernandez MD;  Location:  [facility] CATH INVASIVE LOCATION;  Service: Cardiovascular;  Laterality: N/A;   • KNEE ARTHROSCOPY      right x 2, left x 1   •  LACERATION REPAIR      right leg   • LEG SURGERY      2 for fracture of rt leg    • TONSILLECTOMY      Adnoidectomy   • TRANS METATARSAL AMPUTATION Left 8/2/2022    Procedure: GREAT TOE AMPUTATION LEFT;  Surgeon: Gopal Márquez MD;  Location: [facility];  Service: Vascular;  Laterality: Left;      General Information     Row Name 11/20/22 1102 11/20/22 1002       Physical Therapy Time and Intention    Document Type --  -LS therapy note (daily note)  -LS    Mode of Treatment --  -LS physical therapy  -LS    Row Name 11/20/22 1102 11/20/22 1002       General Information    Patient Profile Reviewed --  -LS yes  -LS    Existing Precautions/Restrictions --  -LS weight bearing;other (see comments)  LLE WBAT with offloading shoe, L toe amputation 8/22. incontinent of bowel today but said he had taken a stool softener. Offloading shoe replaced (put insert from old shoe into new); stool had gotten on the other shoe  -LS    Row Name 11/20/22 1102 11/20/22 1002       Cognition    Orientation Status (Cognition) --  -LS oriented to;person;place  pt frequently repeats himself  -LS          User Key  (r) = Recorded By, (t) = Taken By, (c) = Cosigned By    Initials Name Provider Type    LS Lucy Faulkner, PT Physical Therapist               Mobility     Row Name 11/20/22 1002          Bed Mobility    Comment, (Bed Mobility) deferred-[facility]  -LS     Row Name 11/20/22 1002          Sit-Stand Transfer    Sit-Stand [place] (Transfers) minimum assist (75% patient effort)  -LS     Assistive Device (Sit-Stand Transfers) walker, front-wheeled  -LS     Comment, (Sit-Stand Transfer) CGA from bedside chair. Min A from lower commode surface  -LS     Row Name 11/20/22 1002          Gait/Stairs (Locomotion)    [place] Level (Gait) minimum assist (75% patient effort)  -LS     Assistive Device (Gait) walker, front-wheeled  -LS     Distance in Feet (Gait) 15+ + 30'  -LS     Deviations/Abnormal Patterns (Gait) gait speed  decreased;bilateral deviations;stride length decreased  -     Bilateral Gait Deviations forward flexed posture  -     Comment, (Gait/Stairs) vcs for upright posture. walked to bathroom; had BM on floor as walking. pt then walked 30' in room. walked CGA except one LOB backwards needing min A to regain balance.  -           User Key  (r) = Recorded By, (t) = Taken By, (c) = Cosigned By    Initials Name Provider Type    Lucy Cook PT Physical Therapist               Obj/Interventions     Row Name 11/20/22 1102          Motor Skills    Therapeutic Exercise hip;knee;ankle  -     Row Name 11/20/22 1102          Hip (Therapeutic Exercise)    Hip (Therapeutic Exercise) isometric exercises;strengthening exercise  -     Hip Isometrics (Therapeutic Exercise) bilateral;gluteal sets;10 repetitions;2 sets  -     Hip Strengthening (Therapeutic Exercise) right;left;marching while seated;20 repititions  -     Row Name 11/20/22 1102          Knee (Therapeutic Exercise)    Knee (Therapeutic Exercise) strengthening exercise  -     Knee Strengthening (Therapeutic Exercise) right;left;LAQ (long arc quad);SLR (straight leg raise);20 repititions  -     Row Name 11/20/22 1102          Ankle (Therapeutic Exercise)    Ankle (Therapeutic Exercise) AROM (active range of motion)  -     Ankle AROM (Therapeutic Exercise) bilateral;dorsiflexion;plantarflexion;20 repititions  -     Row Name 11/20/22 1102          Balance    Position/Device Used, Standing Balance walker, rolling  -     Balance Interventions standing;sit to stand;supported;weight shifting activity  -           User Key  (r) = Recorded By, (t) = Taken By, (c) = Cosigned By    Initials Name Provider Type    Lucy Cook PT Physical Therapist               Goals/Plan    No documentation.                Clinical Impression     Row Name 11/20/22 1002          Pain    Pretreatment Pain Rating 0/10 - no pain  -LS     Posttreatment Pain  Rating 0/10 - no pain  -LS     Row Name 11/20/22 1002          Plan of Care Review    Plan of Care Reviewed With patient  -LS     Progress improving  -LS     Outcome Evaluation Pt able to walk further but did have one LOB needing min A to regain balance. Spouse called at end of session; they want [employer] . CM notified.  -     Row Name 11/20/22 1002          Vital Signs    Pre Systolic BP Rehab --  VSS  -     Row Name 11/20/22 1002          Positioning and Restraints    Pre-Treatment Position sitting in chair/recliner  -LS     Post Treatment Position chair  -LS     In Chair notified nsg;sitting;call light within reach;encouraged to call for assist;exit alarm on;legs elevated  -LS           User Key  (r) = Recorded By, (t) = Taken By, (c) = Cosigned By    Initials Name Provider Type    Lucy Cook PT Physical Therapist               Outcome Measures     Row Name 11/20/22 1002          How much help from another person do you currently need...    Turning from your back to your side while in flat bed without using bedrails? 4  -LS     Moving from lying on back to sitting on the side of a flat bed without bedrails? 3  -LS     Moving to and from a bed to a chair (including a wheelchair)? 3  -LS     Standing up from a chair using your arms (e.g., wheelchair, bedside chair)? 3  -LS     Climbing 3-5 steps with a railing? 3  -LS     To walk in hospital room? 3  -LS     AM-PAC 6 Clicks Score (PT) 19  -LS     Highest level of mobility 6 --> Walked 10 steps or more  -     Row Name 11/20/22 1002          Functional Assessment    Outcome Measure Options AM-PAC 6 Clicks Basic Mobility (PT)  -LS           User Key  (r) = Recorded By, (t) = Taken By, (c) = Cosigned By    Initials Name Provider Type    Lucy Cook PT Physical Therapist                             Physical Therapy Education     Title: PT OT SLP Therapies (In Progress)     Topic: Physical Therapy (In Progress)     Point: Mobility  training (Done)     Learning Progress Summary           Patient Acceptance, E, VU,NR by LS at 11/20/2022 1002    Acceptance, E, VU,NR by NS at 11/15/2022 1203                   Point: Home exercise program (Not Started)     Learner Progress:  Not documented in this visit.          Point: Body mechanics (Done)     Learning Progress Summary           Patient Acceptance, E, VU,NR by NS at 11/15/2022 1203                   Point: Precautions (Done)     Learning Progress Summary           Patient Acceptance, E, VU,NR by NS at 11/15/2022 1203                               User Key     Initials Effective Dates Name Provider Type Discipline     06/16/21 -  Lucy Faulkner, PT Physical Therapist PT    NS 06/16/21 -  Suha Noland, SETH Physical Therapist PT              PT Recommendation and Plan     Plan of Care Reviewed With: patient  Progress: improving  Outcome Evaluation: Pt able to walk further but did have one LOB needing min A to regain balance. Spouse called at end of session; they want [employer] . CM notified.     Time Calculation:    PT Charges     Row Name 11/20/22 1002             Time Calculation    Start Time 1002  -LS      PT Received On 11/20/22  -      PT Goal Re-Cert Due Date 11/25/22  -         Time Calculation- PT    Total Timed Code Minutes- PT 55 minute(s)  -         Timed Charges    52392 - PT Therapeutic Exercise Minutes 10  -      09322 - Gait Training Minutes  10  -      84180 - PT Therapeutic Activity Minutes 35  -LS         Total Minutes    Timed Charges Total Minutes 55  -LS       Total Minutes 55  -LS            User Key  (r) = Recorded By, (t) = Taken By, (c) = Cosigned By    Initials Name Provider Type     Lucy Faulkner, PT Physical Therapist              Therapy Charges for Today     Code Description Service Date Service Provider Modifiers Qty    19593711697 HC PT THER PROC EA 15 MIN 11/20/2022 Lucy Faulkner, PT GP 1    15223834244 HC GAIT TRAINING EA 15 MIN  11/20/2022 Lucy Faulkner, PT GP 1    86737604825 HC PT THERAPEUTIC ACT EA 15 MIN 11/20/2022 Lucy Faulkner, PT GP 2          PT G-Codes  Outcome Measure Options: AM-PAC 6 Clicks Basic Mobility (PT)  AM-PAC 6 Clicks Score (PT): 19  AM-PAC 6 Clicks Score (OT): 18       Lucy Faulkner, PT  11/20/2022

## 2022-11-20 NOTE — THERAPY WOUND CARE TREATMENT
Acute Care - Wound/Debridement Treatment Note  [facility]     Patient Name: Jermaine Singh  : 1945  MRN: [id number]  Today's Date: 2022                Admit Date: 2022    Visit Dx:    ICD-10-CM ICD-9-CM   1. Acute on chronic HFrEF (heart failure with reduced ejection fraction) ([facility])  I50.23 428.23   2. Unstable angina ([facility])  I20.0 411.1   3. Shortness of breath  R06.02 786.05   4. Pleural effusion  J90 511.9   5. History of chronic kidney disease  Z87.448 V13.09   6. Vitamin D deficiency on Rx   E55.9 268.9   7. Hypothyroidism (acquired)  E03.9 244.9   8. PVD (peripheral vascular disease) ([facility])  I73.9 443.9   9. Status post amputation of great toe, left ([facility])  Z89.412 V49.71   10. Anemia, chronic disease  D63.8 285.29   11. Pleural effusion, bilateral  J90 511.9   12. Cellulitis of right lower extremity  L03.115 682.6   13. Subacute osteomyelitis of left foot ([facility])  M86.272 730.07   14. Atherosclerosis of native artery of both lower extremities with intermittent claudication ([facility])  I70.213 440.21   15. 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  Preserved LV function   • Type 2 diabetes mellitus ([facility])   • Hypertension   • Hyperlipidemia   • Hypothyroidism (acquired)   • Vitamin D deficiency on Rx    • Diabetic neuropathy   • RBBB   • S/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 Au   • Atherosclerosis of native artery of both lower extremities with intermittent claudication ([facility])   • SSS (sick sinus syndrome) ([facility])   • Subacute osteomyelitis of left foot ([facility])   • Acute on chronic HFrEF (heart failure with reduced ejection fraction) ([facility])   • DVT, bilateral lower limbs ([facility])   • Elevated troponin   • Cellulitis of right lower extremity   •  Obesity (BMI 30.0-34.9)   • Elevated serum creatinine   • Anemia, chronic disease   • Pleural effusion, bilateral   • PVD (peripheral vascular disease) ([facility])   • Status post amputation of great toe, left ([facility])   • Diabetic foot ulcer ([facility])   • Generalized weakness   • GERD without esophagitis   • CKD (chronic kidney disease) stage 3, GFR 30-59 ml/min ([facility])        Past Medical History:   Diagnosis Date   • Allergic    • Arthritis    • Asthma    • Coronary artery disease    • Diabetes mellitus ([facility]) 2000    started on inuslin 12/2018; started on po meds in 2000; checking blood sugars daily    • Disease of thyroid gland     po meds daily for hypothyroidism    • History of fracture as a child     rt leg- severe    • Hyperlipidemia    • Hypertension    • Hypothyroidism    • Peripheral neuropathy    • Peripheral vascular disease ([facility])     s/p angiogram 2/19-needs stent in left leg    • Proximal phalanx fracture of the second digit extending into the second metatarsal joint 7/28/2022   • RBBB    • Right knee pain    • Vitamin D deficiency         Past Surgical History:   Procedure Laterality Date   • APPENDECTOMY     • CARDIAC CATHETERIZATION N/A 2/14/2019    Procedure: Left Heart Cath;  Surgeon: Cooper Apodaca MD;  Location:  [facility] CATH INVASIVE LOCATION;  Service: Cardiology   • CARDIAC ELECTROPHYSIOLOGY PROCEDURE N/A 5/18/2022    Procedure: DEVICE IMPLANT;  Surgeon: Cooper Apodaca MD;  Location:  [facility] CATH INVASIVE LOCATION;  Service: Cardiology;  Laterality: N/A;   • COLONOSCOPY     • CORONARY ARTERY BYPASS GRAFT N/A 3/22/2019    Procedure: MEDIAN STERNOTOMY, CORONARY ARTERY BYPASS GRAFT X3, UTILIZING THE LEFT INTERNAL MAMMARY ARTERY, EVH AND OPEN HARVEST OF THE RIGHT GREATER SAPHENOUS VEIN, EXPLORATION OF THE LEFT LEG;  Surgeon: Ap Au MD;  Location:  [facility] OR;  Service: Cardiothoracic   • EYE SURGERY Bilateral     cataracts    • INTERVENTIONAL RADIOLOGY PROCEDURE N/A 7/29/2021     Procedure: Abdominal Aortagram with Runoff;  Surgeon: Jermaine Hernandez MD;  Location:  [facility] CATH INVASIVE LOCATION;  Service: Cardiovascular;  Laterality: N/A;   • KNEE ARTHROSCOPY      right x 2, left x 1   • LACERATION REPAIR      right leg   • LEG SURGERY      2 for fracture of rt leg    • TONSILLECTOMY      Adnoidectomy   • TRANS METATARSAL AMPUTATION Left 8/2/2022    Procedure: GREAT TOE AMPUTATION LEFT;  Surgeon: Gopal Márquez MD;  Location:  [facility] OR;  Service: Vascular;  Laterality: Left;           Wound 08/02/22 1023 Left anterior great toe (Active)   Wound Image    11/20/22 1335   Dressing Appearance dressing loose;moist drainage 11/20/22 1335   Base moist;yellow;slough;necrotic;dry;scab;red 11/20/22 1335   Periwound intact;dry;redness;warm 11/20/22 1335   Periwound Temperature warm 11/20/22 1335   Periwound Skin Turgor soft 11/20/22 1335   Edges open;irregular 11/20/22 1335   Drainage Characteristics/Odor serosanguineous;yellow;creamy 11/20/22 1335   Drainage Amount scant 11/20/22 1335   Care, Wound cleansed with;soap and water;debrided 11/20/22 1335   Dressing Care dressing applied;low-adherent;foam 11/20/22 1335   Periwound Care cleansed with pH balanced cleanser;dry periwound area maintained 11/20/22 1335       Wound 11/15/22 0653 Left posterior greater trochanter Other (comment) (Active)   Dressing Appearance dry;intact 11/20/22 0800   Periwound intact;dry 11/19/22 2044       Wound 11/15/22 0654 Right lower leg Skin Tear (Active)   Dressing Appearance open to air 11/20/22 0800   Base dressing in place, unable to visualize 11/19/22 2044       Wound 11/16/22 0901 Left anterior knee Abrasion (Active)   Wound Image    11/20/22 1335   Dressing Appearance open to air 11/20/22 1335   Base moist;pink;red;epithelialization 11/20/22 1335   Periwound intact;dry;pink;redness 11/20/22 1335   Periwound Temperature warm 11/20/22 1335   Periwound Skin Turgor soft 11/20/22 1335   Edges open 11/20/22 1331    Wound Length (cm) 0.3 cm 11/20/22 1335   Wound Width (cm) 1.4 cm 11/20/22 1335   Wound Depth (cm) 0.1 cm 11/20/22 1335   Wound Surface Area (cm^2) 0.42 cm^2 11/20/22 1335   Wound Volume (cm^3) 0.042 cm^3 11/20/22 1335   Drainage Characteristics/Odor serosanguineous 11/20/22 1335   Drainage Amount scant 11/20/22 1335   Care, Wound cleansed with;soap and water;debrided 11/20/22 1335   Dressing Care dressing changed;silver impregnated;low-adherent;foam 11/20/22 1335   Periwound Care cleansed with pH balanced cleanser;dry periwound area maintained 11/20/22 1335      Lymphedema     Row Name 11/20/22 1400             Lymphedema Edema Assessment    Ptting Edema Category By severity  -      Pitting Edema Mild;Moderate  -         Compression/Skin Care    Compression/Skin Care skin care;wrapping location  -      Skin Care washed/dried;lotion applied  -      Wrapping Location lower extremity  -      Wrapping Location LE bilateral:;foot to knee  -      Wrapping Comments BLE size 4/5 compressogrips applied doubled and overlapping for gradient compression  -            User Key  (r) = Recorded By, (t) = Taken By, (c) = Cosigned By    Initials Name Provider Type     Viet Pollock, PT Physical Therapist                WOUND DEBRIDEMENT  Total area of Debridement: ~12cm2  Debridement Site 1  Location- Site 1: BLE  Selective Debridement- Site 1: Wound Surface <20cmsq  Instruments- Site 1: tweezers  Excised Tissue Description- Site 1: maximum, slough, eschar, other (comment) (scabbing, debris, dried exudate)  Bleeding- Site 1: none               PT Assessment (last 12 hours)     PT Evaluation and Treatment     Row Name 11/20/22 1335          Physical Therapy Time and Intention    Subjective Information no complaints  -     Document Type therapy note (daily note)  -     Mode of Treatment physical therapy;individual therapy  -     Row Name 11/20/22 1335          Pain    Pretreatment Pain Rating 0/10 - no pain   -     Posttreatment Pain Rating 0/10 - no pain  -     Row Name 11/20/22 1335          Cognition    Affect/Mental Status (Cognition) WNL  -     Orientation Status (Cognition) oriented to;person;place  -     Row Name             Wound 11/15/22 0653 Left posterior greater trochanter Other (comment)    Wound - Properties Group Placement Date: 11/15/22  - Placement Time: 0653  -BH Side: Left  -BH Orientation: posterior  -BH Location: greater trochanter  -BH Primary Wound Type: Other  -BH, open sore     Retired Wound - Properties Group Placement Date: 11/15/22  - Placement Time: 0653  -BH Side: Left  -BH Orientation: posterior  -BH Location: greater trochanter  -BH Primary Wound Type: Other  -BH, open sore     Retired Wound - Properties Group Date first assessed: 11/15/22  - Time first assessed: 0653  -BH Side: Left  -BH Location: greater trochanter  -BH Primary Wound Type: Other  -BH, open sore     Row Name             Wound 11/15/22 0654 Right lower leg Skin Tear    Wound - Properties Group Placement Date: 11/15/22  - Placement Time: 0654  -BH Side: Right  -BH Orientation: lower  -BH Location: leg  -BH Primary Wound Type: Skin tear  -BH    Retired Wound - Properties Group Placement Date: 11/15/22  - Placement Time: 0654  -BH Side: Right  -BH Orientation: lower  -BH Location: leg  -BH Primary Wound Type: Skin tear  -BH    Retired Wound - Properties Group Date first assessed: 11/15/22  - Time first assessed: 0654  -BH Side: Right  -BH Location: leg  -BH Primary Wound Type: Skin tear  -BH    Row Name 11/20/22 1335          Wound 08/02/22 1023 Left anterior great toe    Wound - Properties Group Placement Date: 08/02/22  -JR Placement Time: 1023  -JR Present on Hospital Admission: N  -JR Side: Left  -JR Orientation: anterior  -JR Location: great toe  -JR    Wound Image Images linked: 2  -LH     Dressing Appearance dressing loose;moist drainage  -LH     Base moist;yellow;slough;necrotic;dry;scab;red  -LH   "   Periwound intact;dry;redness;warm  -LH     Periwound Temperature warm  -LH     Periwound Skin Turgor soft  -LH     Edges open;irregular  -LH     Drainage Characteristics/Odor serosanguineous;yellow;creamy  -LH     Drainage Amount scant  -LH     Care, Wound cleansed with;soap and water;debrided  -LH     Dressing Care dressing applied;low-adherent;foam  Painted with betadine, 6\" optifoam to great toe stump, 2\" optifoam to 2nd toe  -LH     Periwound Care cleansed with pH balanced cleanser;dry periwound area maintained  -LH     Retired Wound - Properties Group Placement Date: 08/02/22  -JR Placement Time: 1023  -JR Present on Hospital Admission: N  -JR Side: Left  -JR Orientation: anterior  -JR Location: great toe  -JR    Retired Wound - Properties Group Date first assessed: 08/02/22  -JR Time first assessed: 1023  -JR Present on Hospital Admission: N  -JR Side: Left  -JR Location: great toe  -JR    Row Name 11/20/22 1335          Wound 11/16/22 0901 Left anterior knee Abrasion    Wound - Properties Group Placement Date: 11/16/22  - Placement Time: 0901  - Side: Left  - Orientation: anterior  - Location: knee  - Primary Wound Type: Abrasion  -MC    Wound Image Images linked: 2  -LH     Dressing Appearance open to air  -     Base moist;pink;red;epithelialization  -     Periwound intact;dry;pink;redness  -LH     Periwound Temperature warm  -LH     Periwound Skin Turgor soft  -LH     Edges open  -LH     Wound Length (cm) 0.3 cm  -LH     Wound Width (cm) 1.4 cm  -     Wound Depth (cm) 0.1 cm  -LH     Wound Surface Area (cm^2) 0.42 cm^2  -LH     Wound Volume (cm^3) 0.042 cm^3  -LH     Drainage Characteristics/Odor serosanguineous  -LH     Drainage Amount scant  -LH     Care, Wound cleansed with;soap and water;debrided  -LH     Dressing Care dressing changed;silver impregnated;low-adherent;foam  Mepilex Ag, primafix tape  -LH     Periwound Care cleansed with pH balanced cleanser;dry periwound area " maintained  -     Retired Wound - Properties Group Placement Date: 11/16/22  - Placement Time: 0901 - Side: Left  - Orientation: anterior  - Location: knee  -MC Primary Wound Type: Abrasion  -MC    Retired Wound - Properties Group Date first assessed: 11/16/22  - Time first assessed: 0901 - Side: Left  -MC Location: knee  -MC Primary Wound Type: Abrasion  -MC    Row Name 11/20/22 1335          Coping    Observed Emotional State calm;cooperative  -     Verbalized Emotional State acceptance  -     Trust Relationship/Rapport care explained;questions answered  -     Family/Support Persons spouse  -     Involvement in Care at bedside;attentive to patient  -     Row Name 11/20/22 133          Plan of Care Review    Plan of Care Reviewed With patient;spouse  -     Progress no change  -     Outcome Evaluation Pt presents this date with proximal calf compression removed d/t soiling. Pt's L knee wound open to air and pt's L knee wound and L great toe amputation stump with considerable necrotic tissue, debris, hair, and fibers inhabiting wound base. PT able to debride significant amount of necrotic tissue and debris from wound to reduce bacterial load and improve wound healing potential. Pt's L knee wound with good, clean, re-epithelializing wound base following debridement. PT covered with mepilex Ag and retention tape. Pt painted L great toe amputation site and L 2nd toe with Betadine and covered with optifoam. PT supplied pt/spouse with additional wound dressings and compression wraps for at home use and educated them on proper wound/edema management. Pt would continue to benefit from skilled wound care for further debridement and advanced dressing changes.  -     Row Name 11/20/22 1331          Positioning and Restraints    Pre-Treatment Position sitting in chair/recliner  -     Post Treatment Position chair  -     In Chair reclined;call light within reach;encouraged to call for  assist;with family/caregiver;exit alarm on  -           User Key  (r) = Recorded By, (t) = Taken By, (c) = Cosigned By    Initials Name Provider Type    Marilu Moran, RN Registered Nurse    Viet Mansfield, PT Physical Therapist    Vivian Oliva, RN Registered Nurse    Guerline Huerta RN Registered Nurse              Physical Therapy Education     Title: PT OT SLP Therapies (Resolved)     Topic: Physical Therapy (Resolved)     Point: Mobility training (Resolved)     Learning Progress Summary           Patient Acceptance, E, VU,NR by LS at 11/20/2022 1002    Acceptance, E, VU,NR by NS at 11/15/2022 1203                   Point: Home exercise program (Resolved)     Learner Progress:  Not documented in this visit.          Point: Body mechanics (Resolved)     Learning Progress Summary           Patient Acceptance, E, VU,NR by NS at 11/15/2022 1203                   Point: Precautions (Resolved)     Learning Progress Summary           Patient Acceptance, E, VU,NR by NS at 11/15/2022 1203                               User Key     Initials Effective Dates Name Provider Type Discipline     06/16/21 -  Lucy Faulkner, PT Physical Therapist PT    NS 06/16/21 -  Suha Noland PT Physical Therapist PT                Recommendation and Plan  Anticipated Discharge Disposition (PT): inpatient rehabilitation facility  Planned Therapy Interventions (PT): wound care  Therapy Frequency (PT): daily  Plan of Care Reviewed With: patient, spouse   Progress: no change       Progress: no change  Outcome Evaluation: Pt presents this date with proximal calf compression removed d/t soiling. Pt's L knee wound open to air and pt's L knee wound and L great toe amputation stump with considerable necrotic tissue, debris, hair, and fibers inhabiting wound base. PT able to debride significant amount of necrotic tissue and debris from wound to reduce bacterial load and improve wound healing potential. Pt's L knee  wound with good, clean, re-epithelializing wound base following debridement. PT covered with mepilex Ag and retention tape. Pt painted L great toe amputation site and L 2nd toe with Betadine and covered with optifoam. PT supplied pt/spouse with additional wound dressings and compression wraps for at home use and educated them on proper wound/edema management. Pt would continue to benefit from skilled wound care for further debridement and advanced dressing changes.  Plan of Care Reviewed With: patient, spouse            Time Calculation   PT Charges     Row Name 11/20/22 1442 11/20/22 1002          Time Calculation    Start Time 1335  -LH 1002  -LS     PT Received On -- 11/20/22  -     PT Goal Re-Cert Due Date 11/25/22  -LH 11/25/22  -LS        Time Calculation- PT    Total Timed Code Minutes- PT -- 55 minute(s)  -LS        Timed Charges    95792 - PT Therapeutic Exercise Minutes -- 10  -LS     92653 - Gait Training Minutes  -- 10  -LS     34360 - PT Therapeutic Activity Minutes -- 35  -LS        Untimed Charges    Wound Care 20266 Selective debridement;18872 Multilayer comp below knee  -LH --     39386-Fyyadblxmj comp below knee 15  -LH --     76408-Uzvbqtokx debridement 25  -LH --        Total Minutes    Timed Charges Total Minutes -- 55  -LS     Untimed Charges Total Minutes 40  -LH --      Total Minutes 40  -LH 55  -LS           User Key  (r) = Recorded By, (t) = Taken By, (c) = Cosigned By    Initials Name Provider Type    LS Lucy Faulkner, PT Physical Therapist     Viet Pollock, PT Physical Therapist                  Therapy Charges for Today     Code Description Service Date Service Provider Modifiers Qty    43937779018 HC ROXANNE DEBRIDE OPEN WOUND UP TO 20CM 11/20/2022 Viet Pollock, PT GP 1    [id number] HC PT MULTI LAYER COMP SYS BELOW KNEE 11/20/2022 Viet Pollock, PT GP 1            PT G-Codes  Outcome Measure Options: AM-PAC 6 Clicks Basic Mobility (PT)  AM-PAC 6 Clicks Score (PT):  19  AM-PAC 6 Clicks Score (OT): 18       Viet Pollock, PT  11/20/2022

## 2022-11-20 NOTE — PLAN OF CARE
Goal Outcome Evaluation:  Plan of Care Reviewed With: patient        Progress: no change     VSS. On room air. 2L NC when sleeping. Denies any complaints or needs.

## 2022-11-20 NOTE — PLAN OF CARE
Goal Outcome Evaluation:  Plan of Care Reviewed With: patient, spouse        Progress: no change  Outcome Evaluation: Pt presents this date with proximal calf compression removed d/t soiling. Pt's L knee wound open to air and pt's L knee wound and L great toe amputation stump with considerable necrotic tissue, debris, hair, and fibers inhabiting wound base. PT able to debride significant amount of necrotic tissue and debris from wound to reduce bacterial load and improve wound healing potential. Pt's L knee wound with good, clean, re-epithelializing wound base following debridement. PT covered with mepilex Ag and retention tape. Pt painted L great toe amputation site and L 2nd toe with Betadine and covered with optifoam. PT supplied pt/spouse with additional wound dressings and compression wraps for at home use and educated them on proper wound/edema management. Pt would continue to benefit from skilled wound care for further debridement and advanced dressing changes.

## 2022-11-20 NOTE — DISCHARGE SUMMARY
[facility]  DISCHARGE SUMMARY    Patient Name: Jermaine Singh  : 1945  MRN: [id number]    Date of Admission: 2022  8:35 PM  Date of Discharge:  22  Primary Care Physician: Mj Kennedy DO    Consults     Date and Time Order Name Status Description    11/15/2022  4:12 AM Inpatient Cardiology Consult Completed           Hospital Course     Presenting Problem:   Acute on chronic HFrEF (heart failure with reduced ejection fraction) ([facility]) [I50.23]    Active Hospital Problems    Diagnosis  POA   • **Acute on chronic HFrEF (heart failure with reduced ejection fraction) ([facility]) [I50.23]  Yes   • Elevated serum creatinine [R79.89]  Yes   • Anemia, chronic disease [D63.8]  Yes   • Pleural effusion, bilateral [J90]  Yes   • PVD (peripheral vascular disease) ([facility]) [I73.9]  Yes   • Status post amputation of great toe, left ([facility]) [Z89.412]  Not Applicable   • Diabetic foot ulcer ([facility]) [E11.621, L97.509]  Yes   • Generalized weakness [R53.1]  Yes   • GERD without esophagitis [K21.9]  Yes   • CKD (chronic kidney disease) stage 3, GFR 30-59 ml/min ([facility]) [N18.30]  Yes   • Elevated troponin [R77.8]  Yes   • SSS (sick sinus syndrome) ([facility]) [I49.5]  Yes   • Type 2 diabetes mellitus ([facility]) [E11.9]  Yes   • Hypertension [I10]  Yes   • Hyperlipidemia [E78.5]  Yes   • Hypothyroidism (acquired) [E03.9]  Yes   • S/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 Au [Z95.1]  Not Applicable      Resolved Hospital Problems   No resolved problems to display.          Hospital Course:  Jermaine Singh is a 77 y.o. male with a PMH significant for systolic CHF, CAD s/p CABG, HTN, HLD, SSS s/p pacemaker, history of DVT on Eliquis, CKD, diabetes mellitus type 2, hypothyroidism, PVD, chronic anemia, GERD, diabetic foot ulcers status post left great toe amputation 2022 who comes to the ED due to shortness of breath.  Patient was recently hospitalized at [facility] where he was treated for CHF  exacerbation.  He was discharged to [facility] rehab.  Wife at bedside reports progressive weakness, lower extremity edema, chills, chest pain, SOB.  Patient weighed 230 lbs at discharge from [facility] rehab, weight increased to 248 after d/c home.  Dr. Apodaca changed his diuretic regimen.         **Acute/chronic systolic CHF exacerbation   **Bilateral pleural effusions   **Elevated troponin   **CAD (s/p CABG)   **HTN, HLD   **SSS (s/p PPM)   -Cardiology/Dr. Apodaca following. Stopped hydralazine.  Recs continue entresto and coreg.  S/p diuresis with IV bumex chmaged to bumex 1mg PO BID on 11/16/22  -CT with bilateral pleural effusions and adjacent consolidation, likely atelectasis.  Pneumonia not excluded.  Monitor for signs of infection, low threshold to broaden antibiotic coverage  -troponin appears chronically elevated  -last ECHO 8/22 w/ EF 46-50%  -recent Lexiscan 10/3 that showed anteroseptal scar with no significant ischemia. LVEF 42%  -Talk with the patient and his wife extensively at bedside.  They need to follow-up at heart failure clinic.  We will consult heart failure navigator before discharge.     **Diabetic foot ulcer (2nd toe left foot)   **S/p left great toe amputation (8/2/22)   **PVD   -pt follows w/ Dr. Márquez and Dr. Braga w/ ID; consult PRN   -on Doxycyline BID through February; continued   -CRP mildly elevated  -s/p debridement, leg wraps per PT wound care     **Elevated creatinine   **CKD Stage III  -baseline CR ~1.6-1.7  - CR on 11/17/2022 was 1.69     **Generalized weakness  -pt,ot worked with the patient  -Both patient and wife tell me that patient has major difficulty with ambulation and at home has minimal mobility.  However, they think they can go home if home health could provide physical therapy.  - CM talked to the patient again today and answered his questions. He tolad me he wants to go home and is OK with HH arrangement.         **BLE DVTs (dx 8/22)   -continue Eliquis       **T2DM  -hem a1c 7.3  -holding routine novolog and Levemir (8 units)   -FSBG TID w/ LDSS     **Hypothyroidism   -increase levothyroxine from 75mcg daily to 88mcg daily  - needs to follow up with PCP and recheck  Thyroid hormones in about 6 weeks.     **GERD  -continue Pepcid      **Depression, currently asymptomatic.       Discharge Follow Up Recommendations for outpatient labs/diagnostics:   PCP should do BMP during first follow up. Also check thyroid hormones in about 6 weeks.    Day of Discharge     HPI:   Resting in bed in no acute distress and tells me he is feeling better.  He is actually eager to go home.  No fever or chills.  No chest pain or palpitation or shortness of breath.  No nausea vomiting or diarrhea or abdominal pain.    Review of Systems  As above    Vital Signs:   Temp:  [97.1 °F (36.2 °C)-98.2 °F (36.8 °C)] 97.8 °F (36.6 °C)  Heart Rate:  [60-67] 64  Resp:  [18] 18  BP: (107-132)/(55-71) 116/63  Flow (L/min):  [2] 2      Physical Exam:  Constitutional: No acute distress  HENT: NCAT, mucous membranes moist  Respiratory: Clear to auscultation bilaterally, respiratory effort normal  Cardiovascular: RRR, no murmurs, rubs, or gallops  Gastrointestinal: Positive bowel sounds, soft, nontender, nondistended  Musculoskeletal: 1+pitting edema bilateral LE's  Psychiatric: Appropriate affect, cooperative  Neurologic: Awake, alert, oriented x3, no focality appreciated, speech clear  Skin: No rashes    Pertinent  and/or Most Recent Results     LAB RESULTS:      Lab 11/15/22  0507 11/15/22  0001 11/14/22 2036   WBC 5.92  --  6.42   HEMOGLOBIN 10.5*  --  10.9*   HEMATOCRIT 33.3*  --  34.7*   PLATELETS 154  --  166   NEUTROS ABS 3.95  --  4.38   IMMATURE GRANS (ABS) 0.02  --  0.01   LYMPHS ABS 1.05  --  1.12   MONOS ABS 0.74  --  0.75   EOS ABS 0.13  --  0.12   MCV 91.7  --  91.6   SED RATE  --   --  8   CRP  --  0.67*  --    PROCALCITONIN  --  0.06  --    LACTATE  --   --  1.3         Lab 11/20/22  1840  11/17/22  0545 11/16/22  0423 11/15/22  0507 11/14/22 2036   SODIUM 141 144 140 141 142   POTASSIUM 4.6 4.8 4.3 4.4 5.1   CHLORIDE 103 104 102 104 103   CO2 29.0 30.0* 31.0* 25.0 29.0   ANION GAP 9.0 10.0 7.0 12.0 10.0   BUN 43* 41* 42* 43* 43*   CREATININE 1.64* 1.69* 1.68* 1.70* 1.95*   EGFR 42.8* 41.3* 41.6* 41.0* 34.8*   GLUCOSE 153* 130* 167* 157* 177*   CALCIUM 9.0 8.4* 8.7 9.0 9.3   MAGNESIUM  --  1.8 1.9 2.0  --    PHOSPHORUS  --   --  3.8  --   --    HEMOGLOBIN A1C  --   --   --  7.30*  --    TSH  --   --   --  8.020*  --          Lab 11/14/22 2036   TOTAL PROTEIN 6.4   ALBUMIN 3.80   GLOBULIN 2.6   ALT (SGPT) 55*   AST (SGOT) 23   BILIRUBIN 0.3   ALK PHOS 168*   LIPASE 10*         Lab 11/16/22  0423 11/15/22  0507 11/15/22  0001 11/14/22 2036 11/14/22  1155   PROBNP 23,101.0*  --   --  26,381.0* 23,706.0*   TROPONIN T  --  0.071* 0.076* 0.074*  --                  Brief Urine Lab Results  (Last result in the past 365 days)      Color   Clarity   Blood   Leuk Est   Nitrite   Protein   CREAT   Urine HCG        11/15/22 1003 Yellow   Cloudy   Moderate (2+)   Moderate (2+)   Negative   Negative               Microbiology Results (last 10 days)     Procedure Component Value - Date/Time    Urine Culture - Urine, Urine, Clean Catch [[id number]]  (Abnormal) Collected: 11/15/22 1003    Lab Status: Final result Specimen: Urine, Clean Catch Updated: 11/16/22 0737     Urine Culture Yeast isolated     Comment: No further workup.       Narrative:      Colonization of the urinary tract without infection is common. Treatment is discouraged unless the patient is symptomatic, pregnant, or undergoing an invasive urologic procedure.    Respiratory Panel PCR w/COVID-19(SARS-CoV-2) [facility] In-House, NP Swab in UTM/VTM, 3-4 HR TAT - Swab, Nasopharynx [[id number]]  (Normal) Collected: 11/15/22 0352    Lab Status: Final result Specimen: Swab from Nasopharynx Updated: 11/15/22 0454     ADENOVIRUS, PCR Not  Detected     Coronavirus 229E Not Detected     Coronavirus HKU1 Not Detected     Coronavirus NL63 Not Detected     Coronavirus OC43 Not Detected     COVID19 Not Detected     Human Metapneumovirus Not Detected     Human Rhinovirus/Enterovirus Not Detected     Influenza A PCR Not Detected     Influenza B PCR Not Detected     Parainfluenza Virus 1 Not Detected     Parainfluenza Virus 2 Not Detected     Parainfluenza Virus 3 Not Detected     Parainfluenza Virus 4 Not Detected     RSV, PCR Not Detected     Bordetella pertussis pcr Not Detected     Bordetella parapertussis PCR Not Detected     Chlamydophila pneumoniae PCR Not Detected     Mycoplasma pneumo by PCR Not Detected    Narrative:      In the setting of a positive respiratory panel with a viral infection PLUS a negative procalcitonin without other underlying concern for bacterial infection, consider observing off antibiotics or discontinuation of antibiotics and continue supportive care. If the respiratory panel is positive for atypical bacterial infection (Bordetella pertussis, Chlamydophila pneumoniae, or Mycoplasma pneumoniae), consider antibiotic de-escalation to target atypical bacterial infection.          CT Chest Without Contrast Diagnostic    Result Date: 11/14/2022  DATE OF EXAM: 11/14/2022 10:24 PM  PROCEDURE: CT CHEST WO CONTRAST DIAGNOSTIC-  INDICATIONS: Questionable infiltrates on chest x-ray, shortness of breath  COMPARISON: Chest radiograph performed the same day. Chest CT 08/12/2022.  TECHNIQUE: Routine transaxial slices were obtained through the chest without the administration of intravenous contrast. Reconstructed coronal and sagittal images were also obtained. Automated exposure control and iterative construction methods were used.  The radiation dose reduction device was turned on for each scan per the ALARA (As Low as Reasonably Achievable) protocol.  FINDINGS: There are small to moderate bilateral pleural effusions, similar to the prior  chest CT. No pneumothorax is seen. Respiratory motion somewhat limits evaluation. There is mild consolidation adjacent to the effusions. No other significant infiltrate is seen at this time. Central airways appear patent. The nodules previously seen in the anterior right base are not visualized on this exam.   The heart appears enlarged. Status post median sternotomy. Pacemaker is present. No pericardial effusion. There is advanced calcific atherosclerosis of the coronary arteries. No definite acute aortic abnormality is seen. There is aortic atherosclerosis. There is a small hiatal hernia. No significant lymphadenopathy is seen.  There is diffuse body wall edema. There is a small amount of ascites. Calcified gallstones are noted.  Degenerative changes are present in the spine. No aggressive osseous lesion is seen.      1.  Small to moderate bilateral pleural effusions with mild adjacent consolidation, likely atelectasis, but pneumonia is not excluded. 2.  Body wall edema and small amount of ascites which may indicate heart failure/volume overload. 3.  Cardiomegaly. Calcific atherosclerosis. 4.  Cholelithiasis.  This report was finalized on 11/14/2022 11:13 PM by Eyad Wilburn MD.      XR Chest 1 View    Result Date: 11/14/2022  DATE OF EXAM: 11/14/2022 9:12 PM  PROCEDURE: XR CHEST 1 VW-  INDICATIONS: Chest Pain Triage Protocol  COMPARISON: 10/02/2022.  TECHNIQUE: Single radiographic view of the chest was obtained.  FINDINGS: There is suspicion for small pleural effusions. No pneumothorax. There are mild left basilar opacities.  Status post median sternotomy and CABG. The heart appears mildly enlarged. Pacemaker electrodes appear in expected positions.  Previously seen right lung opacities have improved.      1.  Cardiomegaly. 2.  Small pleural effusions. 3.  Mild left basilar opacities which may represent atelectasis or infiltrate.  This report was finalized on 11/14/2022 9:45 PM by Eyad Wilburn MD.        Results  for orders placed during the hospital encounter of 07/27/22    Duplex Renal Artery - Bilateral Complete CAR    Interpretation Summary  US RENAL ARTERIES. DUPLEX RENAL ARTERY BILATERAL COMPLETE CAR-    History: Acute kidney injury in the setting of peripheral vascular  disease as well as history of CABG, rule out WILMA.    Comparison: None.    Technique: Two-dimensional grayscale, color flow, pulse wave spectral  Doppler examination of the right and left renal circulation. The study  is not optimized for the examination of the right and left renal  parenchyma.    Limitations: Bowel gas and patient's body habitus. The patient was short  of breath and unable to cooperate with breath-holding. This also limited  the visualization of the renal arteries.    Findings:    Abdominal aorta:  AP diameter of the Proximal segment: 1.7 cm. This is normal.  Peak systolic velocity in the suprarenal aorta is noted below. The  spectral Doppler waveform shows Low resistance waveform. There is  spectral broadening.    Renal artery ratios:  Right: Within normal limits.  Left: Within normal limits.    Right kidney: Limited examination  Size: Normal  Echogenicity: within normal limits to the extent seen.  Other findings: Not applicable.    Left kidney: Limited examination  Size: Normal  Echogenicity: within normal limits to the extent seen.  Other findings: A 1.3 x 1.4 cm cyst is mentioned in the note but no  corresponding images are available for my review of the study.    Right renal circulation:  Main right renal artery:  Peak systolic velocity criteria support absence of hemodynamically  significant renal artery stenosis.  The resistive index is elevated.  Acceleration time at the hilum is borderline above the upper limits of  normal.    Right renal vein is patent.    Left renal circulation:  Main left renal artery:  Peak systolic velocity criteria support absence of hemodynamically  significant renal artery stenosis.  The resistive  index is elevated.  Acceleration time at the hilum is normal.    Left renal vein is patent.    Other findings:  None.    Impression  Impression:  No sonographic evidence of clinically significant stenosis of the right  or the left renal artery on this exam.    The resistive indices are elevated bilaterally that in combination with  the findings suggest parenchymal renal disease.    Please note that the study is not optimized for evaluation of the right  and left renal parenchyma. A separate dedicated ultrasound examination  should be performed for that purpose.    This report was finalized on 8/16/2022 1:23 PM by Sinan Dinero MD.      Results for orders placed during the hospital encounter of 07/27/22    Duplex Renal Artery - Bilateral Complete CAR    Interpretation Summary  US RENAL ARTERIES. DUPLEX RENAL ARTERY BILATERAL COMPLETE CAR-    History: Acute kidney injury in the setting of peripheral vascular  disease as well as history of CABG, rule out WILMA.    Comparison: None.    Technique: Two-dimensional grayscale, color flow, pulse wave spectral  Doppler examination of the right and left renal circulation. The study  is not optimized for the examination of the right and left renal  parenchyma.    Limitations: Bowel gas and patient's body habitus. The patient was short  of breath and unable to cooperate with breath-holding. This also limited  the visualization of the renal arteries.    Findings:    Abdominal aorta:  AP diameter of the Proximal segment: 1.7 cm. This is normal.  Peak systolic velocity in the suprarenal aorta is noted below. The  spectral Doppler waveform shows Low resistance waveform. There is  spectral broadening.    Renal artery ratios:  Right: Within normal limits.  Left: Within normal limits.    Right kidney: Limited examination  Size: Normal  Echogenicity: within normal limits to the extent seen.  Other findings: Not applicable.    Left kidney: Limited examination  Size: Normal  Echogenicity:  within normal limits to the extent seen.  Other findings: A 1.3 x 1.4 cm cyst is mentioned in the note but no  corresponding images are available for my review of the study.    Right renal circulation:  Main right renal artery:  Peak systolic velocity criteria support absence of hemodynamically  significant renal artery stenosis.  The resistive index is elevated.  Acceleration time at the hilum is borderline above the upper limits of  normal.    Right renal vein is patent.    Left renal circulation:  Main left renal artery:  Peak systolic velocity criteria support absence of hemodynamically  significant renal artery stenosis.  The resistive index is elevated.  Acceleration time at the hilum is normal.    Left renal vein is patent.    Other findings:  None.    Impression  Impression:  No sonographic evidence of clinically significant stenosis of the right  or the left renal artery on this exam.    The resistive indices are elevated bilaterally that in combination with  the findings suggest parenchymal renal disease.    Please note that the study is not optimized for evaluation of the right  and left renal parenchyma. A separate dedicated ultrasound examination  should be performed for that purpose.    This report was finalized on 8/16/2022 1:23 PM by Sinan Dinero MD.      Results for orders placed during the hospital encounter of 07/27/22    Adult Transthoracic Echo Complete W/ Cont if Necessary Per Protocol    Interpretation Summary  · Left ventricular ejection fraction appears to be 46 - 50%. Left ventricular systolic function is low normal.  · Left ventricular septal hypokinesis  · No significant valvular heart disease          Discharge Details        Discharge Medications      Changes to Medications      Instructions Start Date   bumetanide 1 MG tablet  Commonly known as: BUMEX  What changed: when to take this   1 mg, Oral, 2 Times Daily      famotidine 20 MG tablet  Commonly known as: PEPCID  What changed:    · when to take this  · reasons to take this   20 mg, Oral, Daily      insulin detemir 100 UNIT/ML injection  Commonly known as: LEVEMIR  What changed: when to take this   8 Units, Subcutaneous, Nightly      levothyroxine 88 MCG tablet  Commonly known as: SYNTHROID, LEVOTHROID  What changed:   · medication strength  · how much to take  · when to take this   88 mcg, Oral, Every Early Morning   Start Date: November 21, 2022        Continue These Medications      Instructions Start Date   acetaminophen 325 MG tablet  Commonly known as: TYLENOL   650 mg, Oral, Every 6 Hours PRN      apixaban 5 MG tablet tablet  Commonly known as: ELIQUIS   5 mg, Oral, Every 12 Hours Scheduled      aspirin 81 MG chewable tablet   81 mg, Oral, Daily      atorvastatin 20 MG tablet  Commonly known as: LIPITOR   20 mg, Oral, Nightly      carvedilol 3.125 MG tablet  Commonly known as: COREG   3.125 mg, Oral, 2 Times Daily With Meals      Diclofenac Sodium 1 % gel gel  Commonly known as: VOLTAREN   2 g, Topical, 4 Times Daily PRN      doxycycline 100 MG tablet  Commonly known as: VIBRAMYICN   100 mg, Oral, 2 Times Daily      Entresto 24-26 MG tablet  Generic drug: sacubitril-valsartan   1 tablet, Oral, 2 Times Daily      hydrALAZINE 10 MG tablet  Commonly known as: APRESOLINE   10 mg, Oral, Every 8 Hours      Insulin Lispro 100 UNIT/ML injection  Commonly known as: humaLOG   3 Units, Subcutaneous, 3 Times Daily Before Meals, Sliding scale insulin, 1 unit for every 50>150      magnesium oxide 400 MG tablet  Commonly known as: MAG-OX   400 mg, Oral, Daily      nitroglycerin 0.4 MG SL tablet  Commonly known as: NITROSTAT   0.4 mg, Sublingual, Every 5 Minutes PRN, Take no more than 3 doses in 15 minutes.      nystatin [id number] UNIT/GM powder  Commonly known as: MYCOSTATIN   Topical, 3 Times Daily      PROBIOTIC DAILY PO   Oral, Daily      tamsulosin 0.4 MG capsule 24 hr capsule  Commonly known as: FLOMAX   0.4 mg, Oral, Daily      vitamin D3 125  MCG (5000 UT) tablet tablet   5,000 Units, Oral, Daily         Stop These Medications    docusate sodium 100 MG capsule            Allergies   Allergen Reactions   • Vancomycin Other (See Comments)     Kidney failure per last admission         Discharge Disposition:  Home or Self Care    Diet:  Hospital:  Diet Order   Procedures   • Diet Regular Texture (IDDSI 7); Regular Consistency; Regular/House Diet, Diabetic Diets, Cardiac Diets; Low Sodium (2g); Consistent Carbohydrate       Activity:  Activity Instructions     Activity as Tolerated                   CODE STATUS:    Code Status and Medical Interventions:   Ordered at: 11/15/22 0229     Level Of Support Discussed With:    Health Care Surrogate     Code Status (Patient has no pulse and is not breathing):    CPR (Attempt to Resuscitate)     Medical Interventions (Patient has pulse or is breathing):    Full Support       Future Appointments   Date Time Provider Department Center   12/21/2022 10:00 AM Gopal Márquez MD [facility]   2/21/2023  1:00 PM Rudy Dixon MD [facility]   3/22/2023 10:15 AM Marysol Shrestha MD [facility]       Additional Instructions for the Follow-ups that You Need to Schedule     Ambulatory Referral to Home Health   As directed      Face to Face Visit Date: 11/18/2022    Follow-up provider for Plan of Care?: I treated the patient in an acute care facility and will not continue treatment after discharge.    Follow-up provider: CHAVA GAN [[id number]]    Reason/Clinical Findings: S/P hospital stay    Describe mobility limitations that make leaving home difficult: Impaired gait, balance, endurance, and mobitily.    Nursing/Therapeutic Services Requested: Physical Therapy Occupational Therapy Skilled Nursing    Skilled nursing orders: Wound care dressing/changes CHF management    PT orders: Gait Training Therapeutic exercise Transfer training Strengthening    Weight Bearing Status: As Tolerated    Occupational orders:  Energy conservation Strengthening Activities of daily living    Frequency: 1 Week 1         Discharge Follow-up with PCP   As directed       Currently Documented PCP:    Mj Kennedy DO    PCP Phone Number:    [phone]     Follow Up Details: within one week         Discharge Follow-up with Specified Provider: cardiology as per schedule.   As directed      To: cardiology as per schedule.                     Charlie Whelan MD  11/20/22      Time Spent on Discharge:  I spent  45  minutes on this discharge activity which included: face-to-face encounter with the patient, reviewing the data in the system, coordination of the care with the nursing staff as well as consultants, documentation, and entering orders.

## 2022-11-21 ENCOUNTER — DOCUMENTATION (OUTPATIENT)
Dept: CARDIAC REHAB | Facility: HOSPITAL | Age: 77
End: 2022-11-21

## 2022-11-21 ENCOUNTER — TRANSITIONAL CARE MANAGEMENT TELEPHONE ENCOUNTER (OUTPATIENT)
Dept: CALL CENTER | Facility: HOSPITAL | Age: 77
End: 2022-11-21

## 2022-11-21 NOTE — OUTREACH NOTE
Call Center TCM Note    Flowsheet Row Responses   [facility] patient discharged from? [facility]   Does the patient have one of the following disease processes/diagnoses(primary or secondary)? CHF   TCM attempt successful? Yes   Call start time 1116   Call end time 1133   Discharge diagnosis Acute on chronic HFrEF (heart failure with reduced ejection fraction   Meds reviewed with patient/caregiver? Yes   Is the patient having any side effects they believe may be caused by any medication additions or changes? No   Does the patient have all medications ordered at discharge? No   What is keeping the patient from filling the prescriptions? --  [will p/u RX's today]   Is the patient taking all medications as directed (includes completed medication regime)? Yes   Comments Hospital d/c f/u appt on 11/22/22 @10:45am   Does the patient have an appointment with their PCP within 7 days of discharge? Yes   What is the Home health agency?  Home health for SN/PT/OT   Has home health visited the patient within 72 hours of discharge? Yes   Pulse Ox monitoring Intermittent   Pulse Ox device source Patient   O2 Sat comments has not checked today   O2 Sat: education provided Sat levels, Monitoring frequency   Psychosocial issues? No   Did the patient receive a copy of their discharge instructions? Yes   Nursing interventions Reviewed instructions with patient   What is the patient's perception of their health status since discharge? Improving   Nursing interventions Nurse provided patient education   Is the patient/caregiver able to teach back the hierarchy of who to call/visit for symptoms/problems? PCP, Specialist, Home health nurse, Urgent Care, ED, 911 Yes   CHF Zone this Call Green Zone   Green Zone No new swelling -  feet, ankles and legs look normal for you, Physical activity level is normal for you, No new or worsening shortness of breath, Patient reports doing well, Weight check stable   Green Zone Interventions Daily  weight check, Meds as directed, Low sodium diet   TCM call completed? Yes   Call end time 1133   Would this patient benefit from a Referral to [facility] Social Work? No   Is the patient interested in additional calls from an ambulatory ?  NOTE:  applies to high risk patients requiring additional follow-up. No           Stacy Rehman RN    11/21/2022, 11:33 EST

## 2022-11-22 ENCOUNTER — OFFICE VISIT (OUTPATIENT)
Dept: FAMILY MEDICINE CLINIC | Facility: CLINIC | Age: 77
End: 2022-11-22

## 2022-11-22 ENCOUNTER — LAB (OUTPATIENT)
Dept: LAB | Facility: HOSPITAL | Age: 77
End: 2022-11-22

## 2022-11-22 ENCOUNTER — OFFICE VISIT (OUTPATIENT)
Dept: CARDIOLOGY | Facility: HOSPITAL | Age: 77
End: 2022-11-22

## 2022-11-22 VITALS
HEIGHT: 75 IN | HEART RATE: 65 BPM | SYSTOLIC BLOOD PRESSURE: 124 MMHG | DIASTOLIC BLOOD PRESSURE: 72 MMHG | OXYGEN SATURATION: 94 % | BODY MASS INDEX: 28.25 KG/M2

## 2022-11-22 VITALS
SYSTOLIC BLOOD PRESSURE: 96 MMHG | TEMPERATURE: 96.9 F | HEART RATE: 66 BPM | DIASTOLIC BLOOD PRESSURE: 54 MMHG | HEIGHT: 75 IN | OXYGEN SATURATION: 97 % | WEIGHT: 225.31 LBS | RESPIRATION RATE: 17 BRPM | BODY MASS INDEX: 28.01 KG/M2

## 2022-11-22 DIAGNOSIS — F32.A DEPRESSION, UNSPECIFIED DEPRESSION TYPE: ICD-10-CM

## 2022-11-22 DIAGNOSIS — I10 PRIMARY HYPERTENSION: ICD-10-CM

## 2022-11-22 DIAGNOSIS — I50.23 ACUTE ON CHRONIC HFREF (HEART FAILURE WITH REDUCED EJECTION FRACTION): Primary | ICD-10-CM

## 2022-11-22 DIAGNOSIS — B49 FUNGAL INFECTION: ICD-10-CM

## 2022-11-22 DIAGNOSIS — I70.213 ATHEROSCLEROSIS OF NATIVE ARTERY OF BOTH LOWER EXTREMITIES WITH INTERMITTENT CLAUDICATION: ICD-10-CM

## 2022-11-22 DIAGNOSIS — I49.5 SSS (SICK SINUS SYNDROME): ICD-10-CM

## 2022-11-22 DIAGNOSIS — I50.23 ACUTE ON CHRONIC HFREF (HEART FAILURE WITH REDUCED EJECTION FRACTION): ICD-10-CM

## 2022-11-22 DIAGNOSIS — E78.2 MIXED HYPERLIPIDEMIA: ICD-10-CM

## 2022-11-22 LAB
ALBUMIN SERPL-MCNC: 3.3 G/DL (ref 3.5–5.2)
ALBUMIN/GLOB SERPL: 1.3 G/DL
ALP SERPL-CCNC: 111 U/L (ref 39–117)
ALT SERPL W P-5'-P-CCNC: 19 U/L (ref 1–41)
ANION GAP SERPL CALCULATED.3IONS-SCNC: 11.6 MMOL/L (ref 5–15)
AST SERPL-CCNC: 20 U/L (ref 1–40)
BASOPHILS # BLD AUTO: 0.05 10*3/MM3 (ref 0–0.2)
BASOPHILS NFR BLD AUTO: 0.9 % (ref 0–1.5)
BILIRUB SERPL-MCNC: 0.3 MG/DL (ref 0–1.2)
BUN SERPL-MCNC: 41 MG/DL (ref 8–23)
BUN/CREAT SERPL: 22.5 (ref 7–25)
CALCIUM SPEC-SCNC: 9.3 MG/DL (ref 8.6–10.5)
CHLORIDE SERPL-SCNC: 102 MMOL/L (ref 98–107)
CO2 SERPL-SCNC: 30.4 MMOL/L (ref 22–29)
CREAT SERPL-MCNC: 1.82 MG/DL (ref 0.76–1.27)
DEPRECATED RDW RBC AUTO: 45.4 FL (ref 37–54)
EGFRCR SERPLBLD CKD-EPI 2021: 37.8 ML/MIN/1.73
EOSINOPHIL # BLD AUTO: 0.22 10*3/MM3 (ref 0–0.4)
EOSINOPHIL NFR BLD AUTO: 4 % (ref 0.3–6.2)
ERYTHROCYTE [DISTWIDTH] IN BLOOD BY AUTOMATED COUNT: 13.7 % (ref 12.3–15.4)
GLOBULIN UR ELPH-MCNC: 2.6 GM/DL
GLUCOSE SERPL-MCNC: 199 MG/DL (ref 65–99)
HCT VFR BLD AUTO: 33.1 % (ref 37.5–51)
HGB BLD-MCNC: 10.6 G/DL (ref 13–17.7)
IMM GRANULOCYTES # BLD AUTO: 0.01 10*3/MM3 (ref 0–0.05)
IMM GRANULOCYTES NFR BLD AUTO: 0.2 % (ref 0–0.5)
LYMPHOCYTES # BLD AUTO: 1.03 10*3/MM3 (ref 0.7–3.1)
LYMPHOCYTES NFR BLD AUTO: 18.6 % (ref 19.6–45.3)
MCH RBC QN AUTO: 28.8 PG (ref 26.6–33)
MCHC RBC AUTO-ENTMCNC: 32 G/DL (ref 31.5–35.7)
MCV RBC AUTO: 89.9 FL (ref 79–97)
MONOCYTES # BLD AUTO: 0.51 10*3/MM3 (ref 0.1–0.9)
MONOCYTES NFR BLD AUTO: 9.2 % (ref 5–12)
NEUTROPHILS NFR BLD AUTO: 3.72 10*3/MM3 (ref 1.7–7)
NEUTROPHILS NFR BLD AUTO: 67.1 % (ref 42.7–76)
NRBC BLD AUTO-RTO: 0 /100 WBC (ref 0–0.2)
PLATELET # BLD AUTO: 143 10*3/MM3 (ref 140–450)
PMV BLD AUTO: 13 FL (ref 6–12)
POTASSIUM SERPL-SCNC: 4.2 MMOL/L (ref 3.5–5.2)
PROT SERPL-MCNC: 5.9 G/DL (ref 6–8.5)
RBC # BLD AUTO: 3.68 10*6/MM3 (ref 4.14–5.8)
SODIUM SERPL-SCNC: 144 MMOL/L (ref 136–145)
WBC NRBC COR # BLD: 5.54 10*3/MM3 (ref 3.4–10.8)

## 2022-11-22 PROCEDURE — 99214 OFFICE O/P EST MOD 30 MIN: CPT | Performed by: NURSE PRACTITIONER

## 2022-11-22 PROCEDURE — 80053 COMPREHEN METABOLIC PANEL: CPT

## 2022-11-22 PROCEDURE — 1111F DSCHRG MED/CURRENT MED MERGE: CPT | Performed by: INTERNAL MEDICINE

## 2022-11-22 PROCEDURE — 99495 TRANSJ CARE MGMT MOD F2F 14D: CPT | Performed by: INTERNAL MEDICINE

## 2022-11-22 PROCEDURE — 85025 COMPLETE CBC W/AUTO DIFF WBC: CPT

## 2022-11-22 RX ORDER — ASPIRIN 81 MG/1
81 TABLET, CHEWABLE ORAL DAILY
Qty: 90 TABLET | Refills: 3 | Status: SHIPPED | OUTPATIENT
Start: 2022-11-22

## 2022-11-22 RX ORDER — NYSTATIN 100000 [USP'U]/G
POWDER TOPICAL 3 TIMES DAILY
Qty: 30 G | Refills: 0 | Status: SHIPPED | OUTPATIENT
Start: 2022-11-22 | End: 2022-12-12

## 2022-11-22 RX ORDER — NITROGLYCERIN 0.4 MG/1
0.4 TABLET SUBLINGUAL
Qty: 9 TABLET | Refills: 12 | Status: SHIPPED | OUTPATIENT
Start: 2022-11-22

## 2022-11-22 RX ORDER — ATORVASTATIN CALCIUM 20 MG/1
20 TABLET, FILM COATED ORAL NIGHTLY
Qty: 90 TABLET | Refills: 3 | Status: SHIPPED | OUTPATIENT
Start: 2022-11-22

## 2022-11-22 RX ORDER — FAMOTIDINE 20 MG/1
20 TABLET, FILM COATED ORAL DAILY
Qty: 90 TABLET | Refills: 3 | Status: SHIPPED | OUTPATIENT
Start: 2022-11-22

## 2022-11-22 RX ORDER — MAGNESIUM OXIDE 400 MG/1
400 TABLET ORAL DAILY
Qty: 90 TABLET | Refills: 3 | Status: SHIPPED | OUTPATIENT
Start: 2022-11-22

## 2022-11-22 RX ORDER — ESCITALOPRAM OXALATE 5 MG/1
5 TABLET ORAL DAILY
Qty: 90 TABLET | Refills: 1 | Status: ON HOLD | OUTPATIENT
Start: 2022-11-22 | End: 2023-01-20 | Stop reason: SDUPTHER

## 2022-11-22 NOTE — PROGRESS NOTES
"Chief Complaint  Congestive Heart Failure and Establish Care    Subjective      History of Present Illness {CC  Problem List  Visit  Diagnosis   Encounters  Notes  Medications  Labs  Result Review Imaging  Media :23}     Jermaine Singh, 77 y.o. male with systolic CHF, CAD s/p CABG, HTN, HLD, SSS s/p pacemaker, history of DVT on Eliquis, CKD stage III, diabetes mellitus type 2, hypothyroidism, PVD, chronic anemia, GERD, diabetic foot ulcers status post left great toe amputation 8/2022 presents to [facility] for Congestive Heart Failure and Establish Care. Primary cardiologist is Dr. Apodaca.     Patient was recently hospitalized at [facility] where he was treated for CHF exacerbation.  He was discharged to [facility] rehab.  Readmitted with progressive weakness, lower extremity edema, chills, chest pain, SOB, weight gain.  Patient was diuresed during hospitalization. Scheduled  For follow-up with Dr. Apodaca in approximately in one week.    Patient presents today doing well since discharge. Weight is down 2lb since discharge, LE edema is stably, slightly improved. Shortness of breath is improved.  Down 18lb since prior to hospitalization. Denies chest pain, tachypalpitation, near syncope/syncope.       Objective     Vital Signs:   Vitals:    11/22/22 1439 11/22/22 1441   BP: 123/72 96/54   BP Location: Right arm Left arm   Patient Position: Sitting Sitting   Cuff Size: Adult Adult   Pulse: 62 66   Resp:  17   Temp:  96.9 °F (36.1 °C)   TempSrc:  Temporal   SpO2: 94% 97%   Weight:  102 kg (225 lb 5 oz)   Height:  190.5 cm (75\")     Body mass index is 28.16 kg/m².  Physical Exam  Vitals and nursing note reviewed.   Constitutional:       Appearance: Normal appearance.   HENT:      Head: Normocephalic.   Eyes:      Extraocular Movements: Extraocular movements intact.   Neck:      Vascular: No carotid bruit.   Cardiovascular:      Rate and Rhythm: Normal rate and regular rhythm.      " Pulses: Normal pulses.      Heart sounds: Normal heart sounds, S1 normal and S2 normal. No murmur heard.  Pulmonary:      Effort: Pulmonary effort is normal. No respiratory distress.      Breath sounds: Normal breath sounds.   Musculoskeletal:      Cervical back: Neck supple.      Right lower leg: Edema present.      Left lower leg: Edema present.      Comments: 1+ LE edema   Skin:     General: Skin is warm and dry.   Neurological:      General: No focal deficit present.      Mental Status: He is alert.   Psychiatric:         Mood and Affect: Mood normal.         Behavior: Behavior normal.         Thought Content: Thought content normal.        Data Reviewed:{ Labs  Result Review  Imaging  Med Tab  Media :23}     Basic Metabolic Panel (11/20/2022 11:39)  ECG 12 Lead Dyspnea (11/15/2022 05:19)  Stress Test With Myocardial Perfusion One Day (10/03/2022 14:12)  Adult Transthoracic Echo Complete W/ Cont if Necessary Per Protocol (08/11/2022 16:02)      Assessment & Plan   Assessment and Plan {CC Problem List  Visit Diagnosis  ROS  Review (Popup)  Health Maintenance  Quality  BestPractice  Medications  SmartSets  SnapShot Encounters  Media :23}     1. Acute on chronic HFrEF (heart failure with reduced ejection fraction) ([facility])  -NYHA class III symptoms  -Echo 8/11/22 reviewed:  EF 46-50%  -Reports dyspnea, LE edema improved since recent hospitalization  -Continue GDMT: carvedilol, Entresto  -Diuretic regimen: Bumex 1mg BID  -Bmp ordered today by PCP  -f/u with Dr. Apodaca in 1 week as scheduled    2. CAD  -Denies chest pain/anginal symptoms  -Continue ASA, atorvastatin  -Continue to follow with primary cardiology as above    3. PVD   -S/p toe amputation August 2022  -Continue ASA, statin  -Continue postop follow-up with Dr. Márquez schedule approximately 1 month      Follow Up {Instructions Charge Capture  Follow-up Communications :23}     Return if symptoms worsen or fail to improve.      Patient was  given instructions and counseling regarding his condition or for health maintenance advice. Please see specific information pulled into the AVS if appropriate.  Patient was instructed to call the Heart [facility] Center with any questions, concerns, or worsening symptoms.    Dictated Utilizing Dragon Dictation   Please note that portions of this note were completed with a voice recognition program.   Part of this note may be an electronic transcription/translation of spoken language to printed text using the Dragon Dictation System.

## 2022-11-22 NOTE — PROGRESS NOTES
Chief Complaint   Patient presents with   • Congestive Heart Failure     [facility] ED f/u admission 11/14/2022 - Discharge 11/20/2022    Current outpatient and discharge medications have been reconciled for the patient.  Reviewed by: Mj Kennedy DO      HPI:  Jermaine Singh is a 77 y.o. male who presents today for follow-up heart failure exacerbation.    ROS:  Constitutional: no fevers, night sweats or unexplained weight loss  Eyes: no vision changes  ENT: no runny nose, ear pain, sore throat  Cardio: no chest pain, palpitations  Pulm: no shortness of breath, wheezing, or cough  GI: no abdominal pain or changes in bowel movements  : no difficulty urinating  MSK: no difficulty ambulating, no joint pain  Neuro: no weakness, dizziness or headache  Psych: no trouble sleeping  Endo: no change in appetite      Past Medical History:   Diagnosis Date   • Allergic    • Arthritis    • Asthma    • Coronary artery disease    • Diabetes mellitus (HCC) 2000    started on inuslin 12/2018; started on po meds in 2000; checking blood sugars daily    • Disease of thyroid gland     po meds daily for hypothyroidism    • History of fracture as a child     rt leg- severe    • Hyperlipidemia    • Hypertension    • Hypothyroidism    • Peripheral neuropathy    • Peripheral vascular disease (HCC)     s/p angiogram 2/19-needs stent in left leg    • Proximal phalanx fracture of the second digit extending into the second metatarsal joint 7/28/2022   • RBBB    • Right knee pain    • Vitamin D deficiency       Family History   Problem Relation Age of Onset   • Coronary artery disease Mother    • Diabetes Mother    • Cancer Father       Social History     Socioeconomic History   • Marital status:    • Number of children: 2   Tobacco Use   • Smoking status: Never   • Smokeless tobacco: Never   Vaping Use   • Vaping Use: Never used   Substance and Sexual Activity   • Alcohol use: Not Currently     Comment: every 2-3 months   • Drug  use: No   • Sexual activity: Defer      Allergies   Allergen Reactions   • Vancomycin Other (See Comments)     Kidney failure per last admission      Immunization History   Administered Date(s) Administered   • COVID-19 (PFIZER) PURPLE CAP 01/16/2021   • FLUAD TRI 65YR+ 10/12/2018   • Hepatitis A 11/12/2018, 06/27/2019   • Pneumococcal Conjugate 13-Valent (PCV13) 10/12/2018   • Shingrix 06/27/2019, 10/22/2019        PE:  Vitals:    11/22/22 1031   BP: 124/72   Pulse: 65   SpO2: 94%      Body mass index is 28.25 kg/m².    Gen Appearance: NAD  HEENT: Normocephalic, PERRLA, no thyromegaly, trache midline  Heart: RRR, normal S1 and S2, no murmur  Lungs: CTA b/l, no wheezing, no crackles  Abdomen: Soft, non-tender, non-distended, no guarding and BSx4  MSK: Moves all extremities well, normal gait, no peripheral edema  Pulses: Palpable and equal b/l  Lymph nodes: No palpable lymphadenopathy   Neuro: No focal deficits      Current Outpatient Medications   Medication Sig Dispense Refill   • apixaban (ELIQUIS) 5 MG tablet tablet Take 1 tablet by mouth Every 12 (Twelve) Hours. Indications: DVT/PE (active thrombosis) 180 tablet 0   • aspirin 81 MG chewable tablet Chew 1 tablet Daily. 90 tablet 3   • atorvastatin (LIPITOR) 20 MG tablet Take 1 tablet by mouth Every Night. 90 tablet 3   • famotidine (PEPCID) 20 MG tablet Take 1 tablet by mouth Daily. 90 tablet 3   • magnesium oxide (MAG-OX) 400 MG tablet Take 1 tablet by mouth Daily. 90 tablet 3   • nitroglycerin (NITROSTAT) 0.4 MG SL tablet Place 1 tablet under the tongue Every 5 (Five) Minutes As Needed for Chest Pain. Take no more than 3 doses in 15 minutes. 9 tablet 12   • nystatin (MYCOSTATIN) 168360 UNIT/GM powder Apply  topically to the appropriate area as directed 3 (Three) Times a Day. 30 g 0   • acetaminophen (TYLENOL) 325 MG tablet Take 2 tablets by mouth Every 6 (Six) Hours As Needed for Mild Pain.     • bumetanide (BUMEX) 1 MG tablet Take 1 tablet by mouth 2 (Two)  Times a Day. 60 tablet 6   • carvedilol (COREG) 3.125 MG tablet Take 1 tablet by mouth 2 (Two) Times a Day With Meals.     • Cholecalciferol (VITAMIN D3) 5000 units tablet tablet Take 5,000 Units by mouth Daily.     • Diclofenac Sodium (VOLTAREN) 1 % gel gel Apply 2 g topically to the appropriate area as directed 4 (Four) Times a Day As Needed (pain).     • doxycycline (VIBRAMYICN) 100 MG tablet Take 1 tablet by mouth 2 (Two) Times a Day.     • escitalopram (Lexapro) 5 MG tablet Take 1 tablet by mouth Daily. 90 tablet 1   • insulin detemir (LEVEMIR) 100 UNIT/ML injection Inject 8 Units under the skin into the appropriate area as directed Every Night. (Patient taking differently: Inject 8 Units under the skin into the appropriate area as directed Daily.)  12   • Insulin Lispro (humaLOG) 100 UNIT/ML injection Inject 3 Units under the skin into the appropriate area as directed 3 (Three) Times a Day Before Meals. Sliding scale insulin, 1 unit for every 50>150     • levothyroxine (SYNTHROID, LEVOTHROID) 88 MCG tablet Take 1 tablet by mouth Every Morning. 30 tablet 0   • Probiotic Product (PROBIOTIC DAILY PO) Take  by mouth Daily.     • sacubitril-valsartan (Entresto) 24-26 MG tablet Take 1 tablet by mouth 2 (Two) Times a Day. 60 tablet 0   • tamsulosin (FLOMAX) 0.4 MG capsule 24 hr capsule Take 1 capsule by mouth Daily. 30 capsule 0     No current facility-administered medications for this visit.     Facility-Administered Medications Ordered in Other Visits   Medication Dose Route Frequency Provider Last Rate Last Admin   • Chlorhexidine Gluconate Cloth 2 % pads 1 application  1 application Topical Q12H PRN Mohan Arauz PA            Diagnoses and all orders for this visit:    1. Acute on chronic HFrEF (heart failure with reduced ejection fraction) ([facility]) (Primary)  -     Comprehensive Metabolic Panel; Future  -     CBC & Differential; Future  Checking lab work today to monitor electrolytes and kidney function  after switching diuretic dose  2. Fungal infection  -     nystatin (MYCOSTATIN) 075573 UNIT/GM powder; Apply  topically to the appropriate area as directed 3 (Three) Times a Day.  Dispense: 30 g; Refill: 0  Refill nystatin for fungal infection.  3. Primary hypertension  Uncontrolled blood pressure today.  No change in prescription medication  4. Mixed hyperlipidemia    5. Depression, unspecified depression type  -     escitalopram (Lexapro) 5 MG tablet; Take 1 tablet by mouth Daily.  Dispense: 90 tablet; Refill: 1  Starting on Lexapro daily.  See back in 4 to 6 weeks.  Other orders  -     apixaban (ELIQUIS) 5 MG tablet tablet; Take 1 tablet by mouth Every 12 (Twelve) Hours. Indications: DVT/PE (active thrombosis)  Dispense: 180 tablet; Refill: 0  -     atorvastatin (LIPITOR) 20 MG tablet; Take 1 tablet by mouth Every Night.  Dispense: 90 tablet; Refill: 3  -     nitroglycerin (NITROSTAT) 0.4 MG SL tablet; Place 1 tablet under the tongue Every 5 (Five) Minutes As Needed for Chest Pain. Take no more than 3 doses in 15 minutes.  Dispense: 9 tablet; Refill: 12  -     famotidine (PEPCID) 20 MG tablet; Take 1 tablet by mouth Daily.  Dispense: 90 tablet; Refill: 3  -     magnesium oxide (MAG-OX) 400 MG tablet; Take 1 tablet by mouth Daily.  Dispense: 90 tablet; Refill: 3  -     aspirin 81 MG chewable tablet; Chew 1 tablet Daily.  Dispense: 90 tablet; Refill: 3         Return in about 6 weeks (around 1/3/2023) for depression.     Dictated Utilizing Dragon Dictation    Please note that portions of this note were completed with a voice recognition program.    Part of this note may be an electronic transcription/translation of spoken language to printed text using the Dragon Dictation System.

## 2022-11-28 RX ORDER — TAMSULOSIN HYDROCHLORIDE 0.4 MG/1
0.4 CAPSULE ORAL DAILY
Qty: 30 CAPSULE | Refills: 0 | Status: CANCELLED | OUTPATIENT
Start: 2022-11-28

## 2022-11-28 RX ORDER — CARVEDILOL 3.12 MG/1
3.12 TABLET ORAL 2 TIMES DAILY WITH MEALS
Status: CANCELLED
Start: 2022-11-28

## 2022-11-30 ENCOUNTER — READMISSION MANAGEMENT (OUTPATIENT)
Dept: CALL CENTER | Facility: HOSPITAL | Age: 77
End: 2022-11-30

## 2022-11-30 NOTE — OUTREACH NOTE
CHF Week 2 Survey    Flowsheet Row Responses   [facility] facility patient discharged from? [place]   Does the patient have one of the following disease processes/diagnoses(primary or secondary)? CHF   Week 2 attempt successful? No   Call start time 1420   Unsuccessful attempts Attempt 1   List who call center can speak with pt          ADRIANO C - Registered Nurse

## 2022-12-01 RX ORDER — TAMSULOSIN HYDROCHLORIDE 0.4 MG/1
0.4 CAPSULE ORAL DAILY
Qty: 30 CAPSULE | Refills: 0 | Status: SHIPPED | OUTPATIENT
Start: 2022-12-01 | End: 2022-12-15 | Stop reason: SDUPTHER

## 2022-12-01 NOTE — TELEPHONE ENCOUNTER
Caller: ALEJO DAWNE    Relationship: Emergency Contact    Best call back number: [phone]    Requested Prescriptions:   Requested Prescriptions     Pending Prescriptions Disp Refills   • tamsulosin (FLOMAX) 0.4 MG capsule 24 hr capsule 30 capsule 0     Sig: Take 1 capsule by mouth Daily.        Pharmacy where request should be sent: [facility] [address] [place] [address] [phone] [facility] [phone] FX     Additional details provided by patient: PATIENT TAKES THIS MEDICATION REGULARLY.    Does the patient have less than a 3 day supply:  [x] Yes  [] No    Would you like a call back once the refill request has been completed: [x] Yes [] No    If the office needs to give you a call back, can they leave a voicemail: [x] Yes [] No    Ata Ramírez Rep   12/01/22 12:51 EST

## 2022-12-01 NOTE — TELEPHONE ENCOUNTER
Rx Refill Note  Requested Prescriptions     Pending Prescriptions Disp Refills   • tamsulosin (FLOMAX) 0.4 MG capsule 24 hr capsule 30 capsule 0     Sig: Take 1 capsule by mouth Daily.      Last office visit with prescribing clinician: 11/22/2022   Last telemedicine visit with prescribing clinician:    Next office visit with prescribing clinician: 1/3/2023                             Katya Gonzalez MA  12/01/22, 14:23 EST

## 2022-12-12 DIAGNOSIS — B49 FUNGAL INFECTION: ICD-10-CM

## 2022-12-12 RX ORDER — NYSTATIN 100000 [USP'U]/G
POWDER TOPICAL
Qty: 60 G | Refills: 0 | Status: SHIPPED | OUTPATIENT
Start: 2022-12-12 | End: 2023-02-07

## 2022-12-14 NOTE — TELEPHONE ENCOUNTER
Caller: HIPOLITO DAWN    Relationship: Emergency Contact    Best call back number: [phone]    Requested Prescriptions:   Requested Prescriptions     Pending Prescriptions Disp Refills   • levothyroxine (SYNTHROID, LEVOTHROID) 88 MCG tablet 30 tablet 0     Sig: Take 1 tablet by mouth Every Morning.        Pharmacy where request should be sent: [facility] [address] [place] [address] [phone] [facility] [phone] FX     Additional details provided by patient: PLEASE ASK DR GAN TO MAKE SURE THE DOSAGE IS CORRECT. THE PATIENT HAS BEEN FINE ON THIS DOSAGE BUT THE PHARMACY TOLD THEM TO MAKE SURE IT IS CORRECT.    Does the patient have less than a 3 day supply:  [] Yes  [x] No    Would you like a call back once the refill request has been completed: [] Yes [x] No    If the office needs to give you a call back, can they leave a voicemail: [x] Yes [] No    Mitesh Alvares, PCT   12/14/22 12:47 EST

## 2022-12-15 ENCOUNTER — OFFICE VISIT (OUTPATIENT)
Dept: FAMILY MEDICINE CLINIC | Facility: CLINIC | Age: 77
End: 2022-12-15

## 2022-12-15 VITALS
OXYGEN SATURATION: 98 % | DIASTOLIC BLOOD PRESSURE: 54 MMHG | HEART RATE: 60 BPM | HEIGHT: 75 IN | SYSTOLIC BLOOD PRESSURE: 102 MMHG | BODY MASS INDEX: 28.16 KG/M2

## 2022-12-15 DIAGNOSIS — K40.90 RIGHT INGUINAL HERNIA: Primary | ICD-10-CM

## 2022-12-15 PROBLEM — M25.519 ARTHRALGIA OF SHOULDER: Status: ACTIVE | Noted: 2022-11-03

## 2022-12-15 PROCEDURE — 99214 OFFICE O/P EST MOD 30 MIN: CPT | Performed by: INTERNAL MEDICINE

## 2022-12-15 RX ORDER — TAMSULOSIN HYDROCHLORIDE 0.4 MG/1
0.4 CAPSULE ORAL DAILY
Qty: 30 CAPSULE | Refills: 0 | Status: SHIPPED | OUTPATIENT
Start: 2022-12-15 | End: 2023-01-31 | Stop reason: SDUPTHER

## 2022-12-15 RX ORDER — LEVOTHYROXINE SODIUM 88 UG/1
88 TABLET ORAL
Qty: 90 TABLET | Refills: 3 | Status: SHIPPED | OUTPATIENT
Start: 2022-12-15

## 2022-12-15 RX ORDER — LEVOTHYROXINE SODIUM 88 UG/1
88 TABLET ORAL
Qty: 90 TABLET | Refills: 3 | Status: SHIPPED | OUTPATIENT
Start: 2022-12-15 | End: 2022-12-15 | Stop reason: SDUPTHER

## 2022-12-15 NOTE — ASSESSMENT & PLAN NOTE
Gentleman presents for same-day visit with asymptomatic right inguinal hernia.  He agrees to a surgical evaluation however has multiple comorbidities which would need to be optimized prior to any surgical intervention.  I have referred him to general surgery.

## 2022-12-15 NOTE — PROGRESS NOTES
"Jermaine Singh  1945  [id number]  Patient Care Team:  Mj Kennedy DO as PCP - General (Internal Medicine)  Jermaine Hernandez MD as Consulting Physician (Cardiology)    Jermaine Singh is a 77 y.o. male here today for follow up.     This patient is accompanied by their self who contributes to the history of their care.    Chief Complaint:    Chief Complaint   Patient presents with   • Groin Pain     Knot in groin area, Rt side. Noticed about a day.            History of Present Illness:  I have reviewed and/or updated the patient's past medical, past surgical, family, social history, problem list and allergies as appropriate.     This gentleman is here with a new problem.  He was seen in same-day accompanied by his wife.    He has noted a swelling and painful knot in his right groin.pain is burning in nature- worse with palpation.  Denies nausea or vomiting and he does not strain to stool. There is no urinary urgency/freq or dysuria.  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  Pain does not radiate.  It is localized to his right inguinal region.  No fevers or chills.    Review of Systems   Constitutional: Negative.    HENT: Negative.    Eyes: Negative.    Respiratory: Negative.    Cardiovascular: Negative.    Gastrointestinal:        Right inguinal swelling   Endocrine: Negative.    Genitourinary: Negative.    Musculoskeletal: Negative.        Vitals:    12/15/22 1420   BP: 102/54   Pulse: 60   SpO2: 98%   Height: 190.5 cm (75\")     Body mass index is 28.16 kg/m².    Physical Exam  Vitals and nursing note reviewed.   Constitutional:       General: He is not in acute distress.     Appearance: He is well-developed. He is not diaphoretic.   HENT:      Head: Normocephalic and atraumatic.      Right Ear: External ear normal.      Left Ear: External ear normal.      Mouth/Throat:      Pharynx: No oropharyngeal exudate.   Eyes:      General: No " scleral icterus.        Right eye: No discharge.      Conjunctiva/sclera: Conjunctivae normal.   Neck:      Thyroid: No thyromegaly.      Vascular: No JVD.      Trachea: No tracheal deviation.   Cardiovascular:      Rate and Rhythm: Normal rate and regular rhythm.      Heart sounds: Normal heart sounds.      Comments: PMI nondisplaced  Pulmonary:      Effort: Pulmonary effort is normal.      Breath sounds: Normal breath sounds. No wheezing or rales.   Abdominal:      General: Bowel sounds are normal.      Palpations: Abdomen is soft.      Tenderness: There is no abdominal tenderness. There is no guarding or rebound.      Hernia: A hernia is present.      Comments: Exam is painful.  He has a tender reducible inguinal hernia easily palpated with Valsalva maneuve.  Exam within the inguinal canal is exquisitely tender.  Testicular exam is unremarkable.  No cord tenderness   Musculoskeletal:      Cervical back: Normal range of motion and neck supple.      Comments: Wheelchair   Lymphadenopathy:      Cervical: No cervical adenopathy.   Skin:     General: Skin is warm and dry.      Capillary Refill: Capillary refill takes less than 2 seconds.      Coloration: Skin is not pale.      Findings: No rash.   Neurological:      Mental Status: He is alert and oriented to person, place, and time.      Motor: No abnormal muscle tone.      Coordination: Coordination normal.   Psychiatric:         Judgment: Judgment normal.         Procedures    Results Review:    None    Assessment/Plan:     Problem List Items Addressed This Visit        Gastrointestinal Abdominal     Right inguinal hernia - Primary    Current Assessment & Plan     Gentleman presents for same-day visit with asymptomatic right inguinal hernia.  He agrees to a surgical evaluation however has multiple comorbidities which would need to be optimized prior to any surgical intervention.  I have referred him to general surgery.            Plan of care reviewed with patient  at the conclusion of today's visit. Education was provided regarding diagnosis and management.  Patient verbalizes understanding of and agreement with management plan.    Return if symptoms worsen or fail to improve, for Next scheduled follow up.    Jermaine Koenig MD      Please note than portions of this note were completed wt a Voice Recognition Program

## 2022-12-21 ENCOUNTER — OFFICE VISIT (OUTPATIENT)
Dept: CARDIAC SURGERY | Facility: CLINIC | Age: 77
End: 2022-12-21

## 2022-12-21 VITALS
WEIGHT: 238 LBS | TEMPERATURE: 97.5 F | SYSTOLIC BLOOD PRESSURE: 110 MMHG | DIASTOLIC BLOOD PRESSURE: 70 MMHG | BODY MASS INDEX: 29.59 KG/M2 | OXYGEN SATURATION: 98 % | HEART RATE: 75 BPM | HEIGHT: 75 IN

## 2022-12-21 DIAGNOSIS — I25.10 CORONARY ARTERY DISEASE INVOLVING NATIVE CORONARY ARTERY OF NATIVE HEART WITHOUT ANGINA PECTORIS: ICD-10-CM

## 2022-12-21 DIAGNOSIS — I96 GANGRENE: Primary | ICD-10-CM

## 2022-12-21 DIAGNOSIS — I73.9 PERIPHERAL VASCULAR DISEASE: ICD-10-CM

## 2022-12-21 PROCEDURE — 99213 OFFICE O/P EST LOW 20 MIN: CPT | Performed by: THORACIC SURGERY (CARDIOTHORACIC VASCULAR SURGERY)

## 2022-12-21 NOTE — PROGRESS NOTES
"Patient Information  Jermaine Singh                                                                                          [address] [place] [address]      1945  [unfilled]  [unfilled]    Chief Complaint   Patient presents with   • Follow-up     1 MO FU for Left Great Toe Amputation 8/2/22-[facility]       History of Present Illness: Patient seen today in follow-up of left great toe transmetatarsal amputation done on August 2, 2022 this is done for diabetic ulceration osteomyelitis.  Patient was readmitted after discharge from this great toe amputation site with congestive heart failure.  He is a patient of Dr. Apodaca's.  When he came back in readmitted his hammertoe phenomena second toe had exposed middle phalangeal bone.  His medication were adjusted for his congestive failure and he was sent home.  He comes back now presents for routine follow-up visit.  He recently developed a right inguinal hernia and is going to be seen by Dr. Vel Velasco on 25 January 2023 for that.    Vitals:    12/21/22 1005   BP: 110/70   BP Location: Right arm   Patient Position: Sitting   Pulse: 75   Temp: 97.5 °F (36.4 °C)   SpO2: 98%   Weight: 108 kg (238 lb)   Height: 190.5 cm (75\")        Physical Exam examination reveals healed left great toe amputation site.  The hammertoe phenomena of the left second toe has exposed middle phalangeal joint space.  No erythema seen.    Lab/other results:    Assessment: #1.  Status post left great toe transmetatarsal amputation primary closure August 2, 2022 overall is healed.  2.  Ulceration middle phalangeal joint space left second toe due to hammertoe phenomena with exposed joint space.  3.  Chronic congestive heart failure being followed closely by Dr. Apodaca of cardiology  4.  Recent right inguinal hernia.    Plan: Patient going to be seeing Dr. Vel Velasco on January 25, 2023 to consider for elective repair of this inguinal hernia.  I will be discussing the situation Dr." Rod-it might be best to try to do the inguinal hernia repair and the left second toe amputation at the same operative setting.  Patient understands this and would like to proceed with that plan    Gopal Márquez M.D.

## 2022-12-28 ENCOUNTER — TELEPHONE (OUTPATIENT)
Dept: FAMILY MEDICINE CLINIC | Facility: CLINIC | Age: 77
End: 2022-12-28

## 2022-12-28 NOTE — TELEPHONE ENCOUNTER
Umair from [facility] called to inform that pt has had loss of appetite and he thinks that the pt should consider an appetite stimulant or speak with a dietitian      Please advise  Umair  [phone]

## 2022-12-28 NOTE — TELEPHONE ENCOUNTER
Attempted to contact, no answer left message advising home health that PCP is out of office and we can notify once he returns.

## 2022-12-30 ENCOUNTER — TELEPHONE (OUTPATIENT)
Dept: FAMILY MEDICINE CLINIC | Facility: CLINIC | Age: 77
End: 2022-12-30
Payer: MEDICARE

## 2022-12-30 NOTE — TELEPHONE ENCOUNTER
Umair Wilkinson from [facility] called asking for a scrip for a Rollator Walker for pt    Please advise  Umair Wilkinson  [phone]    Fax: [phone]

## 2023-01-03 ENCOUNTER — OFFICE VISIT (OUTPATIENT)
Dept: FAMILY MEDICINE CLINIC | Facility: CLINIC | Age: 78
End: 2023-01-03
Payer: MEDICARE

## 2023-01-03 VITALS
HEART RATE: 62 BPM | SYSTOLIC BLOOD PRESSURE: 110 MMHG | BODY MASS INDEX: 25.12 KG/M2 | OXYGEN SATURATION: 98 % | DIASTOLIC BLOOD PRESSURE: 70 MMHG | WEIGHT: 202 LBS | HEIGHT: 75 IN

## 2023-01-03 DIAGNOSIS — M86.272 SUBACUTE OSTEOMYELITIS OF LEFT FOOT: Primary | ICD-10-CM

## 2023-01-03 DIAGNOSIS — F32.A DEPRESSION, UNSPECIFIED DEPRESSION TYPE: Primary | ICD-10-CM

## 2023-01-03 PROCEDURE — 99213 OFFICE O/P EST LOW 20 MIN: CPT | Performed by: INTERNAL MEDICINE

## 2023-01-03 PROCEDURE — 1160F RVW MEDS BY RX/DR IN RCRD: CPT | Performed by: INTERNAL MEDICINE

## 2023-01-03 PROCEDURE — 1159F MED LIST DOCD IN RCRD: CPT | Performed by: INTERNAL MEDICINE

## 2023-01-04 DIAGNOSIS — R63.0 LACK OF APPETITE: Primary | ICD-10-CM

## 2023-01-04 DIAGNOSIS — R26.89 BALANCE DISORDER: Primary | ICD-10-CM

## 2023-01-04 RX ORDER — CALCIUM CARBONATE 160(400)MG
1 TABLET,CHEWABLE ORAL DAILY
Qty: 1 EACH | Refills: 0 | Status: SHIPPED | OUTPATIENT
Start: 2023-01-04

## 2023-01-04 NOTE — PROGRESS NOTES
Chief Complaint   Patient presents with   • Depression     6 wk depression       HPI:  Jermaine Singh is a 77 y.o. male who presents today for f/u depression. He reports he ultimately decided not to take the medication and would like to discuss ongoing symptoms.     ROS:  Constitutional: no fevers, night sweats or unexplained weight loss  Eyes: no vision changes  ENT: no runny nose, ear pain, sore throat  Cardio: no chest pain, palpitations  Pulm: no shortness of breath, wheezing, or cough  GI: no abdominal pain or changes in bowel movements  : no difficulty urinating  MSK: no difficulty ambulating, no joint pain  Neuro: no weakness, dizziness or headache  Psych: no trouble sleeping  Endo: no change in appetite      Past Medical History:   Diagnosis Date   • Allergic    • Arthritis    • Asthma    • Coronary artery disease    • Diabetes mellitus (HCC) 2000    started on inuslin 12/2018; started on po meds in 2000; checking blood sugars daily    • Disease of thyroid gland     po meds daily for hypothyroidism    • History of fracture as a child     rt leg- severe    • Hyperlipidemia    • Hypertension    • Hypothyroidism    • Peripheral neuropathy    • Peripheral vascular disease (HCC)     s/p angiogram 2/19-needs stent in left leg    • Proximal phalanx fracture of the second digit extending into the second metatarsal joint 7/28/2022   • RBBB    • Right knee pain    • Vitamin D deficiency       Family History   Problem Relation Age of Onset   • Coronary artery disease Mother    • Diabetes Mother    • Cancer Father       Social History     Socioeconomic History   • Marital status:    • Number of children: 2   Tobacco Use   • Smoking status: Never   • Smokeless tobacco: Never   Vaping Use   • Vaping Use: Never used   Substance and Sexual Activity   • Alcohol use: Not Currently     Comment: every 2-3 months   • Drug use: No   • Sexual activity: Defer      Allergies   Allergen Reactions   • Vancomycin Other  (See Comments)     Kidney failure per last admission      Immunization History   Administered Date(s) Administered   • COVID-19 (PFIZER) PURPLE CAP 01/16/2021   • FLUAD TRI 65YR+ 10/12/2018   • Hepatitis A 11/12/2018, 06/27/2019   • Pneumococcal Conjugate 13-Valent (PCV13) 10/12/2018   • Shingrix 06/27/2019, 10/22/2019        PE:  Vitals:    01/03/23 1105   BP: 110/70   Pulse: 62   SpO2: 98%      Body mass index is 25.25 kg/m².    Gen Appearance: NAD  HEENT: Normocephalic, PERRLA, no thyromegaly, trache midline  Heart: RRR, normal S1 and S2, no murmur  Lungs: CTA b/l, no wheezing, no crackles  Abdomen: Soft, non-tender, non-distended, no guarding and BSx4  MSK: Moves all extremities well, normal gait, no peripheral edema  Pulses: Palpable and equal b/l  Lymph nodes: No palpable lymphadenopathy   Neuro: No focal deficits      Current Outpatient Medications   Medication Sig Dispense Refill   • acetaminophen (TYLENOL) 325 MG tablet Take 2 tablets by mouth Every 6 (Six) Hours As Needed for Mild Pain.     • apixaban (ELIQUIS) 5 MG tablet tablet Take 1 tablet by mouth Every 12 (Twelve) Hours. Indications: DVT/PE (active thrombosis) 180 tablet 0   • aspirin 81 MG chewable tablet Chew 1 tablet Daily. 90 tablet 3   • atorvastatin (LIPITOR) 20 MG tablet Take 1 tablet by mouth Every Night. 90 tablet 3   • bumetanide (BUMEX) 1 MG tablet Take 1 tablet by mouth 2 (Two) Times a Day. 60 tablet 6   • carvedilol (COREG) 3.125 MG tablet Take 1 tablet by mouth 2 (Two) Times a Day With Meals.     • Cholecalciferol (VITAMIN D3) 5000 units tablet tablet Take 5,000 Units by mouth Daily.     • Diclofenac Sodium (VOLTAREN) 1 % gel gel Apply 2 g topically to the appropriate area as directed 4 (Four) Times a Day As Needed (pain).     • doxycycline (VIBRAMYICN) 100 MG tablet Take 1 tablet by mouth 2 (Two) Times a Day.     • escitalopram (Lexapro) 5 MG tablet Take 1 tablet by mouth Daily. 90 tablet 1   • famotidine (PEPCID) 20 MG tablet Take 1  tablet by mouth Daily. 90 tablet 3   • insulin detemir (LEVEMIR) 100 UNIT/ML injection Inject 8 Units under the skin into the appropriate area as directed Every Night. (Patient taking differently: Inject 8 Units under the skin into the appropriate area as directed Daily.)  12   • Insulin Lispro (humaLOG) 100 UNIT/ML injection Inject 3 Units under the skin into the appropriate area as directed 3 (Three) Times a Day Before Meals. Sliding scale insulin, 1 unit for every 50>150     • levothyroxine (SYNTHROID, LEVOTHROID) 88 MCG tablet Take 1 tablet by mouth Every Morning. 90 tablet 3   • magnesium oxide (MAG-OX) 400 MG tablet Take 1 tablet by mouth Daily. 90 tablet 3   • nitroglycerin (NITROSTAT) 0.4 MG SL tablet Place 1 tablet under the tongue Every 5 (Five) Minutes As Needed for Chest Pain. Take no more than 3 doses in 15 minutes. 9 tablet 12   • nystatin (MYCOSTATIN) 617395 UNIT/GM powder APPLY  POWDER TOPICALLY TO AFFECTED AREA THREE TIMES DAILY 60 g 0   • Probiotic Product (PROBIOTIC DAILY PO) Take  by mouth Daily.     • sacubitril-valsartan (Entresto) 24-26 MG tablet Take 1 tablet by mouth 2 (Two) Times a Day. 60 tablet 0   • tamsulosin (FLOMAX) 0.4 MG capsule 24 hr capsule Take 1 capsule by mouth Daily. 30 capsule 0     No current facility-administered medications for this visit.     Facility-Administered Medications Ordered in Other Visits   Medication Dose Route Frequency Provider Last Rate Last Admin   • Chlorhexidine Gluconate Cloth 2 % pads 1 application  1 application Topical Q12H PRN Mohan Arauz, PA          Counseling was given to patient and family for the following topics: instructions for management, risk factor reductions, impressions, risks and benefits of treatment options and importance of treatment compliance . Total time of the encounter was 20 minutes and 11 minutes was spent face to face counseling.    Diagnoses and all orders for this visit:    1. Depression, unspecified depression type  (Primary)  Discussed symptoms with patient.  Stable off of medication for now.  He plans on establishing with new PCP in the next few months.       No follow-ups on file.     Dictated Utilizing Dragon Dictation    Please note that portions of this note were completed with a voice recognition program.    Part of this note may be an electronic transcription/translation of spoken language to printed text using the Dragon Dictation System.

## 2023-01-10 ENCOUNTER — LAB (OUTPATIENT)
Dept: LAB | Facility: HOSPITAL | Age: 78
End: 2023-01-10
Payer: MEDICARE

## 2023-01-10 ENCOUNTER — TRANSCRIBE ORDERS (OUTPATIENT)
Dept: LAB | Facility: HOSPITAL | Age: 78
End: 2023-01-10
Payer: MEDICARE

## 2023-01-10 DIAGNOSIS — I50.20 SYSTOLIC HEART FAILURE, UNSPECIFIED HF CHRONICITY: Primary | ICD-10-CM

## 2023-01-10 DIAGNOSIS — I50.20 SYSTOLIC HEART FAILURE, UNSPECIFIED HF CHRONICITY: ICD-10-CM

## 2023-01-10 PROCEDURE — 85027 COMPLETE CBC AUTOMATED: CPT

## 2023-01-10 PROCEDURE — 80048 BASIC METABOLIC PNL TOTAL CA: CPT

## 2023-01-10 PROCEDURE — 83880 ASSAY OF NATRIURETIC PEPTIDE: CPT

## 2023-01-10 PROCEDURE — 36415 COLL VENOUS BLD VENIPUNCTURE: CPT

## 2023-01-11 ENCOUNTER — ANESTHESIA EVENT (OUTPATIENT)
Dept: PERIOP | Facility: HOSPITAL | Age: 78
DRG: 617 | End: 2023-01-11
Payer: MEDICARE

## 2023-01-11 LAB
ANION GAP SERPL CALCULATED.3IONS-SCNC: 11.3 MMOL/L (ref 5–15)
BUN SERPL-MCNC: 52 MG/DL (ref 8–23)
BUN/CREAT SERPL: 31.9 (ref 7–25)
CALCIUM SPEC-SCNC: 9.1 MG/DL (ref 8.6–10.5)
CHLORIDE SERPL-SCNC: 106 MMOL/L (ref 98–107)
CO2 SERPL-SCNC: 24.7 MMOL/L (ref 22–29)
CREAT SERPL-MCNC: 1.63 MG/DL (ref 0.76–1.27)
DEPRECATED RDW RBC AUTO: 45.7 FL (ref 37–54)
EGFRCR SERPLBLD CKD-EPI 2021: 43.1 ML/MIN/1.73
ERYTHROCYTE [DISTWIDTH] IN BLOOD BY AUTOMATED COUNT: 14.4 % (ref 12.3–15.4)
GLUCOSE SERPL-MCNC: 184 MG/DL (ref 65–99)
HCT VFR BLD AUTO: 37.2 % (ref 37.5–51)
HGB BLD-MCNC: 11.7 G/DL (ref 13–17.7)
MCH RBC QN AUTO: 27.4 PG (ref 26.6–33)
MCHC RBC AUTO-ENTMCNC: 31.5 G/DL (ref 31.5–35.7)
MCV RBC AUTO: 87.1 FL (ref 79–97)
NT-PROBNP SERPL-MCNC: ABNORMAL PG/ML (ref 0–1800)
PLATELET # BLD AUTO: 176 10*3/MM3 (ref 140–450)
PMV BLD AUTO: 13.2 FL (ref 6–12)
POTASSIUM SERPL-SCNC: 4.5 MMOL/L (ref 3.5–5.2)
RBC # BLD AUTO: 4.27 10*6/MM3 (ref 4.14–5.8)
SODIUM SERPL-SCNC: 142 MMOL/L (ref 136–145)
WBC NRBC COR # BLD: 7.05 10*3/MM3 (ref 3.4–10.8)

## 2023-01-11 RX ORDER — LIDOCAINE HYDROCHLORIDE 10 MG/ML
0.5 INJECTION, SOLUTION EPIDURAL; INFILTRATION; INTRACAUDAL; PERINEURAL ONCE AS NEEDED
Status: CANCELLED | OUTPATIENT
Start: 2023-01-11

## 2023-01-11 RX ORDER — SODIUM CHLORIDE 0.9 % (FLUSH) 0.9 %
10 SYRINGE (ML) INJECTION EVERY 12 HOURS SCHEDULED
Status: CANCELLED | OUTPATIENT
Start: 2023-01-11

## 2023-01-11 RX ORDER — FAMOTIDINE 10 MG/ML
20 INJECTION, SOLUTION INTRAVENOUS ONCE
Status: CANCELLED | OUTPATIENT
Start: 2023-01-11 | End: 2023-01-11

## 2023-01-11 RX ORDER — MIDAZOLAM HYDROCHLORIDE 1 MG/ML
0.5 INJECTION INTRAMUSCULAR; INTRAVENOUS
Status: CANCELLED | OUTPATIENT
Start: 2023-01-11

## 2023-01-11 RX ORDER — SODIUM CHLORIDE, SODIUM LACTATE, POTASSIUM CHLORIDE, CALCIUM CHLORIDE 600; 310; 30; 20 MG/100ML; MG/100ML; MG/100ML; MG/100ML
9 INJECTION, SOLUTION INTRAVENOUS CONTINUOUS
Status: CANCELLED | OUTPATIENT
Start: 2023-01-11

## 2023-01-11 RX ORDER — SODIUM CHLORIDE 9 MG/ML
40 INJECTION, SOLUTION INTRAVENOUS AS NEEDED
Status: CANCELLED | OUTPATIENT
Start: 2023-01-11

## 2023-01-12 ENCOUNTER — ANESTHESIA (OUTPATIENT)
Dept: PERIOP | Facility: HOSPITAL | Age: 78
DRG: 617 | End: 2023-01-12
Payer: MEDICARE

## 2023-01-16 ENCOUNTER — APPOINTMENT (OUTPATIENT)
Dept: GENERAL RADIOLOGY | Facility: HOSPITAL | Age: 78
DRG: 617 | End: 2023-01-16
Payer: MEDICARE

## 2023-01-16 ENCOUNTER — HOSPITAL ENCOUNTER (INPATIENT)
Facility: HOSPITAL | Age: 78
LOS: 6 days | Discharge: HOME HEALTH | DRG: 617 | End: 2023-01-23
Attending: FAMILY MEDICINE | Admitting: THORACIC SURGERY (CARDIOTHORACIC VASCULAR SURGERY)
Payer: MEDICARE

## 2023-01-16 DIAGNOSIS — J90 PLEURAL EFFUSION, BILATERAL: ICD-10-CM

## 2023-01-16 DIAGNOSIS — I49.5 SSS (SICK SINUS SYNDROME): Chronic | ICD-10-CM

## 2023-01-16 DIAGNOSIS — I20.0 UNSTABLE ANGINA: ICD-10-CM

## 2023-01-16 DIAGNOSIS — I73.9 PVD (PERIPHERAL VASCULAR DISEASE): ICD-10-CM

## 2023-01-16 DIAGNOSIS — I10 PRIMARY HYPERTENSION: Chronic | ICD-10-CM

## 2023-01-16 DIAGNOSIS — F32.A DEPRESSION, UNSPECIFIED DEPRESSION TYPE: ICD-10-CM

## 2023-01-16 DIAGNOSIS — M86.272 SUBACUTE OSTEOMYELITIS OF LEFT FOOT: ICD-10-CM

## 2023-01-16 DIAGNOSIS — R53.1 GENERALIZED WEAKNESS: ICD-10-CM

## 2023-01-16 DIAGNOSIS — L03.115 CELLULITIS OF RIGHT LOWER EXTREMITY: ICD-10-CM

## 2023-01-16 DIAGNOSIS — I25.10 CORONARY ARTERY DISEASE INVOLVING NATIVE CORONARY ARTERY OF NATIVE HEART WITHOUT ANGINA PECTORIS: ICD-10-CM

## 2023-01-16 DIAGNOSIS — M86.9 TOE OSTEOMYELITIS: Primary | ICD-10-CM

## 2023-01-16 DIAGNOSIS — Z89.412 STATUS POST AMPUTATION OF GREAT TOE, LEFT: ICD-10-CM

## 2023-01-16 PROBLEM — I50.22 CHRONIC HFREF (HEART FAILURE WITH REDUCED EJECTION FRACTION): Status: ACTIVE | Noted: 2022-08-15

## 2023-01-16 LAB
ABO GROUP BLD: NORMAL
ALBUMIN SERPL-MCNC: 3.6 G/DL (ref 3.5–5.2)
ALBUMIN/GLOB SERPL: 1.3 G/DL
ALP SERPL-CCNC: 74 U/L (ref 39–117)
ALT SERPL W P-5'-P-CCNC: 12 U/L (ref 1–41)
ANION GAP SERPL CALCULATED.3IONS-SCNC: 11 MMOL/L (ref 5–15)
APTT PPP: 34.9 SECONDS (ref 60–90)
AST SERPL-CCNC: 13 U/L (ref 1–40)
BASOPHILS # BLD AUTO: 0.06 10*3/MM3 (ref 0–0.2)
BASOPHILS NFR BLD AUTO: 0.7 % (ref 0–1.5)
BILIRUB SERPL-MCNC: 0.3 MG/DL (ref 0–1.2)
BLD GP AB SCN SERPL QL: NEGATIVE
BUN SERPL-MCNC: 55 MG/DL (ref 8–23)
BUN/CREAT SERPL: 27.8 (ref 7–25)
CALCIUM SPEC-SCNC: 9.1 MG/DL (ref 8.6–10.5)
CHLORIDE SERPL-SCNC: 106 MMOL/L (ref 98–107)
CO2 SERPL-SCNC: 25 MMOL/L (ref 22–29)
CREAT SERPL-MCNC: 1.98 MG/DL (ref 0.76–1.27)
D-LACTATE SERPL-SCNC: 1.4 MMOL/L (ref 0.5–2)
DEPRECATED RDW RBC AUTO: 51.7 FL (ref 37–54)
EGFRCR SERPLBLD CKD-EPI 2021: 34.2 ML/MIN/1.73
EOSINOPHIL # BLD AUTO: 0.41 10*3/MM3 (ref 0–0.4)
EOSINOPHIL NFR BLD AUTO: 4.9 % (ref 0.3–6.2)
ERYTHROCYTE [DISTWIDTH] IN BLOOD BY AUTOMATED COUNT: 15.7 % (ref 12.3–15.4)
GLOBULIN UR ELPH-MCNC: 2.8 GM/DL
GLUCOSE SERPL-MCNC: 218 MG/DL (ref 65–99)
HBA1C MFR BLD: 8.2 % (ref 4.8–5.6)
HCT VFR BLD AUTO: 35.9 % (ref 37.5–51)
HGB BLD-MCNC: 11.5 G/DL (ref 13–17.7)
IMM GRANULOCYTES # BLD AUTO: 0.02 10*3/MM3 (ref 0–0.05)
IMM GRANULOCYTES NFR BLD AUTO: 0.2 % (ref 0–0.5)
INR PPP: 1.19 (ref 0.84–1.13)
LYMPHOCYTES # BLD AUTO: 1.42 10*3/MM3 (ref 0.7–3.1)
LYMPHOCYTES NFR BLD AUTO: 16.8 % (ref 19.6–45.3)
MAGNESIUM SERPL-MCNC: 2.1 MG/DL (ref 1.6–2.4)
MCH RBC QN AUTO: 28.6 PG (ref 26.6–33)
MCHC RBC AUTO-ENTMCNC: 32 G/DL (ref 31.5–35.7)
MCV RBC AUTO: 89.3 FL (ref 79–97)
MONOCYTES # BLD AUTO: 0.69 10*3/MM3 (ref 0.1–0.9)
MONOCYTES NFR BLD AUTO: 8.2 % (ref 5–12)
NEUTROPHILS NFR BLD AUTO: 5.85 10*3/MM3 (ref 1.7–7)
NEUTROPHILS NFR BLD AUTO: 69.2 % (ref 42.7–76)
NRBC BLD AUTO-RTO: 0 /100 WBC (ref 0–0.2)
PLATELET # BLD AUTO: 178 10*3/MM3 (ref 140–450)
PMV BLD AUTO: 13 FL (ref 6–12)
POTASSIUM SERPL-SCNC: 4.9 MMOL/L (ref 3.5–5.2)
PROT SERPL-MCNC: 6.4 G/DL (ref 6–8.5)
PROTHROMBIN TIME: 15 SECONDS (ref 11.4–14.4)
RBC # BLD AUTO: 4.02 10*6/MM3 (ref 4.14–5.8)
RH BLD: POSITIVE
SODIUM SERPL-SCNC: 142 MMOL/L (ref 136–145)
T&S EXPIRATION DATE: NORMAL
UFH PPP CHRO-ACNC: >1.1 IU/ML (ref 0.3–0.7)
WBC NRBC COR # BLD: 8.45 10*3/MM3 (ref 3.4–10.8)

## 2023-01-16 PROCEDURE — 86901 BLOOD TYPING SEROLOGIC RH(D): CPT | Performed by: NURSE PRACTITIONER

## 2023-01-16 PROCEDURE — 87205 SMEAR GRAM STAIN: CPT | Performed by: NURSE PRACTITIONER

## 2023-01-16 PROCEDURE — 83735 ASSAY OF MAGNESIUM: CPT | Performed by: NURSE PRACTITIONER

## 2023-01-16 PROCEDURE — 85025 COMPLETE CBC W/AUTO DIFF WBC: CPT | Performed by: NURSE PRACTITIONER

## 2023-01-16 PROCEDURE — 83036 HEMOGLOBIN GLYCOSYLATED A1C: CPT | Performed by: NURSE PRACTITIONER

## 2023-01-16 PROCEDURE — 99222 1ST HOSP IP/OBS MODERATE 55: CPT | Performed by: NURSE PRACTITIONER

## 2023-01-16 PROCEDURE — 85730 THROMBOPLASTIN TIME PARTIAL: CPT | Performed by: NURSE PRACTITIONER

## 2023-01-16 PROCEDURE — 73660 X-RAY EXAM OF TOE(S): CPT

## 2023-01-16 PROCEDURE — 71045 X-RAY EXAM CHEST 1 VIEW: CPT

## 2023-01-16 PROCEDURE — 86850 RBC ANTIBODY SCREEN: CPT | Performed by: NURSE PRACTITIONER

## 2023-01-16 PROCEDURE — 85520 HEPARIN ASSAY: CPT | Performed by: NURSE PRACTITIONER

## 2023-01-16 PROCEDURE — 87070 CULTURE OTHR SPECIMN AEROBIC: CPT | Performed by: NURSE PRACTITIONER

## 2023-01-16 PROCEDURE — 86900 BLOOD TYPING SEROLOGIC ABO: CPT | Performed by: NURSE PRACTITIONER

## 2023-01-16 PROCEDURE — 63710000001 INSULIN LISPRO (HUMAN) PER 5 UNITS: Performed by: NURSE PRACTITIONER

## 2023-01-16 PROCEDURE — 80053 COMPREHEN METABOLIC PANEL: CPT | Performed by: NURSE PRACTITIONER

## 2023-01-16 PROCEDURE — 83605 ASSAY OF LACTIC ACID: CPT | Performed by: NURSE PRACTITIONER

## 2023-01-16 PROCEDURE — 87147 CULTURE TYPE IMMUNOLOGIC: CPT | Performed by: NURSE PRACTITIONER

## 2023-01-16 PROCEDURE — 85610 PROTHROMBIN TIME: CPT | Performed by: NURSE PRACTITIONER

## 2023-01-16 RX ORDER — DEXTROSE MONOHYDRATE 25 G/50ML
25 INJECTION, SOLUTION INTRAVENOUS
Status: DISCONTINUED | OUTPATIENT
Start: 2023-01-16 | End: 2023-01-23 | Stop reason: HOSPADM

## 2023-01-16 RX ORDER — MELATONIN
5000 DAILY
Status: DISCONTINUED | OUTPATIENT
Start: 2023-01-17 | End: 2023-01-23 | Stop reason: HOSPADM

## 2023-01-16 RX ORDER — POLYETHYLENE GLYCOL 3350 17 G/17G
17 POWDER, FOR SOLUTION ORAL DAILY PRN
Status: DISCONTINUED | OUTPATIENT
Start: 2023-01-16 | End: 2023-01-23 | Stop reason: HOSPADM

## 2023-01-16 RX ORDER — ACETAMINOPHEN 650 MG/1
650 SUPPOSITORY RECTAL EVERY 4 HOURS PRN
Status: DISCONTINUED | OUTPATIENT
Start: 2023-01-16 | End: 2023-01-23 | Stop reason: HOSPADM

## 2023-01-16 RX ORDER — ASPIRIN 81 MG/1
81 TABLET, CHEWABLE ORAL DAILY
Status: DISCONTINUED | OUTPATIENT
Start: 2023-01-16 | End: 2023-01-23 | Stop reason: HOSPADM

## 2023-01-16 RX ORDER — ESCITALOPRAM OXALATE 10 MG/1
5 TABLET ORAL DAILY
Status: DISCONTINUED | OUTPATIENT
Start: 2023-01-17 | End: 2023-01-21

## 2023-01-16 RX ORDER — INSULIN LISPRO 100 [IU]/ML
0-9 INJECTION, SOLUTION INTRAVENOUS; SUBCUTANEOUS
Status: DISCONTINUED | OUTPATIENT
Start: 2023-01-16 | End: 2023-01-23 | Stop reason: HOSPADM

## 2023-01-16 RX ORDER — BUMETANIDE 1 MG/1
1 TABLET ORAL 2 TIMES DAILY
Status: DISCONTINUED | OUTPATIENT
Start: 2023-01-16 | End: 2023-01-22

## 2023-01-16 RX ORDER — L.ACID,PARA/B.BIFIDUM/S.THERM 8B CELL
1 CAPSULE ORAL DAILY
Status: DISCONTINUED | OUTPATIENT
Start: 2023-01-17 | End: 2023-01-23 | Stop reason: HOSPADM

## 2023-01-16 RX ORDER — ONDANSETRON 4 MG/1
4 TABLET, FILM COATED ORAL EVERY 6 HOURS PRN
Status: DISCONTINUED | OUTPATIENT
Start: 2023-01-16 | End: 2023-01-23 | Stop reason: HOSPADM

## 2023-01-16 RX ORDER — ONDANSETRON 2 MG/ML
4 INJECTION INTRAMUSCULAR; INTRAVENOUS EVERY 6 HOURS PRN
Status: DISCONTINUED | OUTPATIENT
Start: 2023-01-16 | End: 2023-01-23 | Stop reason: HOSPADM

## 2023-01-16 RX ORDER — SODIUM CHLORIDE 0.9 % (FLUSH) 0.9 %
10 SYRINGE (ML) INJECTION AS NEEDED
Status: DISCONTINUED | OUTPATIENT
Start: 2023-01-16 | End: 2023-01-23 | Stop reason: HOSPADM

## 2023-01-16 RX ORDER — NYSTATIN 100000 [USP'U]/G
POWDER TOPICAL EVERY 8 HOURS SCHEDULED
Status: DISCONTINUED | OUTPATIENT
Start: 2023-01-16 | End: 2023-01-23 | Stop reason: HOSPADM

## 2023-01-16 RX ORDER — SODIUM CHLORIDE 9 MG/ML
40 INJECTION, SOLUTION INTRAVENOUS AS NEEDED
Status: DISCONTINUED | OUTPATIENT
Start: 2023-01-16 | End: 2023-01-23 | Stop reason: HOSPADM

## 2023-01-16 RX ORDER — ACETAMINOPHEN 325 MG/1
650 TABLET ORAL EVERY 6 HOURS PRN
Status: DISCONTINUED | OUTPATIENT
Start: 2023-01-16 | End: 2023-01-16 | Stop reason: SDUPTHER

## 2023-01-16 RX ORDER — AMOXICILLIN 250 MG
2 CAPSULE ORAL 2 TIMES DAILY
Status: DISCONTINUED | OUTPATIENT
Start: 2023-01-16 | End: 2023-01-23 | Stop reason: HOSPADM

## 2023-01-16 RX ORDER — ACETAMINOPHEN 160 MG/5ML
650 SOLUTION ORAL EVERY 4 HOURS PRN
Status: DISCONTINUED | OUTPATIENT
Start: 2023-01-16 | End: 2023-01-23 | Stop reason: HOSPADM

## 2023-01-16 RX ORDER — FAMOTIDINE 20 MG/1
20 TABLET, FILM COATED ORAL
Status: DISCONTINUED | OUTPATIENT
Start: 2023-01-17 | End: 2023-01-23 | Stop reason: HOSPADM

## 2023-01-16 RX ORDER — HEPARIN SODIUM 1000 [USP'U]/ML
25 INJECTION, SOLUTION INTRAVENOUS; SUBCUTANEOUS AS NEEDED
Status: DISCONTINUED | OUTPATIENT
Start: 2023-01-16 | End: 2023-01-16

## 2023-01-16 RX ORDER — ATORVASTATIN CALCIUM 20 MG/1
20 TABLET, FILM COATED ORAL NIGHTLY
Status: DISCONTINUED | OUTPATIENT
Start: 2023-01-16 | End: 2023-01-23 | Stop reason: HOSPADM

## 2023-01-16 RX ORDER — HEPARIN SODIUM 10000 [USP'U]/100ML
18 INJECTION, SOLUTION INTRAVENOUS
Status: DISCONTINUED | OUTPATIENT
Start: 2023-01-16 | End: 2023-01-16

## 2023-01-16 RX ORDER — ACETAMINOPHEN 325 MG/1
650 TABLET ORAL EVERY 4 HOURS PRN
Status: DISCONTINUED | OUTPATIENT
Start: 2023-01-16 | End: 2023-01-23 | Stop reason: HOSPADM

## 2023-01-16 RX ORDER — NICOTINE POLACRILEX 4 MG
15 LOZENGE BUCCAL
Status: DISCONTINUED | OUTPATIENT
Start: 2023-01-16 | End: 2023-01-23 | Stop reason: HOSPADM

## 2023-01-16 RX ORDER — HEPARIN SODIUM 1000 [USP'U]/ML
50 INJECTION, SOLUTION INTRAVENOUS; SUBCUTANEOUS AS NEEDED
Status: DISCONTINUED | OUTPATIENT
Start: 2023-01-16 | End: 2023-01-16

## 2023-01-16 RX ORDER — BISACODYL 10 MG
10 SUPPOSITORY, RECTAL RECTAL DAILY PRN
Status: DISCONTINUED | OUTPATIENT
Start: 2023-01-16 | End: 2023-01-23 | Stop reason: HOSPADM

## 2023-01-16 RX ORDER — BISACODYL 5 MG/1
5 TABLET, DELAYED RELEASE ORAL DAILY PRN
Status: DISCONTINUED | OUTPATIENT
Start: 2023-01-16 | End: 2023-01-23 | Stop reason: HOSPADM

## 2023-01-16 RX ORDER — SODIUM CHLORIDE 0.9 % (FLUSH) 0.9 %
10 SYRINGE (ML) INJECTION EVERY 12 HOURS SCHEDULED
Status: CANCELLED | OUTPATIENT
Start: 2023-01-16

## 2023-01-16 RX ORDER — CHOLECALCIFEROL (VITAMIN D3) 125 MCG
5 CAPSULE ORAL NIGHTLY PRN
Status: DISCONTINUED | OUTPATIENT
Start: 2023-01-16 | End: 2023-01-23 | Stop reason: HOSPADM

## 2023-01-16 RX ORDER — CARVEDILOL 3.12 MG/1
3.12 TABLET ORAL 2 TIMES DAILY WITH MEALS
Status: DISCONTINUED | OUTPATIENT
Start: 2023-01-16 | End: 2023-01-23 | Stop reason: HOSPADM

## 2023-01-16 RX ORDER — TAMSULOSIN HYDROCHLORIDE 0.4 MG/1
0.4 CAPSULE ORAL NIGHTLY
Status: DISCONTINUED | OUTPATIENT
Start: 2023-01-16 | End: 2023-01-16

## 2023-01-16 RX ORDER — SODIUM CHLORIDE 0.9 % (FLUSH) 0.9 %
10 SYRINGE (ML) INJECTION EVERY 12 HOURS SCHEDULED
Status: DISCONTINUED | OUTPATIENT
Start: 2023-01-16 | End: 2023-01-23 | Stop reason: HOSPADM

## 2023-01-16 RX ORDER — LEVOTHYROXINE SODIUM 88 UG/1
88 TABLET ORAL
Status: DISCONTINUED | OUTPATIENT
Start: 2023-01-17 | End: 2023-01-23 | Stop reason: HOSPADM

## 2023-01-16 RX ORDER — TAMSULOSIN HYDROCHLORIDE 0.4 MG/1
0.4 CAPSULE ORAL DAILY
Status: DISCONTINUED | OUTPATIENT
Start: 2023-01-17 | End: 2023-01-23 | Stop reason: HOSPADM

## 2023-01-16 RX ADMIN — POLYETHYLENE GLYCOL 3350 17 G: 17 POWDER, FOR SOLUTION ORAL at 21:39

## 2023-01-16 RX ADMIN — SENNOSIDES AND DOCUSATE SODIUM 2 TABLET: 50; 8.6 TABLET ORAL at 21:33

## 2023-01-16 RX ADMIN — INSULIN LISPRO 4 UNITS: 100 INJECTION, SOLUTION INTRAVENOUS; SUBCUTANEOUS at 23:08

## 2023-01-16 RX ADMIN — CARVEDILOL 3.12 MG: 3.12 TABLET, FILM COATED ORAL at 21:32

## 2023-01-16 RX ADMIN — BUMETANIDE 1 MG: 1 TABLET ORAL at 21:33

## 2023-01-16 RX ADMIN — ATORVASTATIN CALCIUM 20 MG: 20 TABLET, FILM COATED ORAL at 21:32

## 2023-01-16 RX ADMIN — NYSTATIN: 100000 POWDER TOPICAL at 21:31

## 2023-01-17 ENCOUNTER — ANESTHESIA EVENT CONVERTED (OUTPATIENT)
Dept: ANESTHESIOLOGY | Facility: HOSPITAL | Age: 78
DRG: 617 | End: 2023-01-17
Payer: MEDICARE

## 2023-01-17 PROBLEM — M86.9 TOE OSTEOMYELITIS: Status: ACTIVE | Noted: 2023-01-17

## 2023-01-17 LAB
BASOPHILS # BLD AUTO: 0.04 10*3/MM3 (ref 0–0.2)
BASOPHILS NFR BLD AUTO: 0.5 % (ref 0–1.5)
DEPRECATED RDW RBC AUTO: 51.2 FL (ref 37–54)
EOSINOPHIL # BLD AUTO: 0.4 10*3/MM3 (ref 0–0.4)
EOSINOPHIL NFR BLD AUTO: 5.4 % (ref 0.3–6.2)
ERYTHROCYTE [DISTWIDTH] IN BLOOD BY AUTOMATED COUNT: 15.7 % (ref 12.3–15.4)
GLUCOSE BLDC GLUCOMTR-MCNC: 146 MG/DL (ref 70–130)
GLUCOSE BLDC GLUCOMTR-MCNC: 159 MG/DL (ref 70–130)
GLUCOSE BLDC GLUCOMTR-MCNC: 237 MG/DL (ref 70–130)
GLUCOSE BLDC GLUCOMTR-MCNC: 242 MG/DL (ref 70–130)
GLUCOSE BLDC GLUCOMTR-MCNC: 265 MG/DL (ref 70–130)
HCT VFR BLD AUTO: 34.6 % (ref 37.5–51)
HGB BLD-MCNC: 11 G/DL (ref 13–17.7)
IMM GRANULOCYTES # BLD AUTO: 0.03 10*3/MM3 (ref 0–0.05)
IMM GRANULOCYTES NFR BLD AUTO: 0.4 % (ref 0–0.5)
LYMPHOCYTES # BLD AUTO: 1.41 10*3/MM3 (ref 0.7–3.1)
LYMPHOCYTES NFR BLD AUTO: 18.9 % (ref 19.6–45.3)
MCH RBC QN AUTO: 28.4 PG (ref 26.6–33)
MCHC RBC AUTO-ENTMCNC: 31.8 G/DL (ref 31.5–35.7)
MCV RBC AUTO: 89.2 FL (ref 79–97)
MONOCYTES # BLD AUTO: 0.66 10*3/MM3 (ref 0.1–0.9)
MONOCYTES NFR BLD AUTO: 8.8 % (ref 5–12)
NEUTROPHILS NFR BLD AUTO: 4.93 10*3/MM3 (ref 1.7–7)
NEUTROPHILS NFR BLD AUTO: 66 % (ref 42.7–76)
NRBC BLD AUTO-RTO: 0 /100 WBC (ref 0–0.2)
PLATELET # BLD AUTO: 169 10*3/MM3 (ref 140–450)
PMV BLD AUTO: 13.1 FL (ref 6–12)
RBC # BLD AUTO: 3.88 10*6/MM3 (ref 4.14–5.8)
UFH PPP CHRO-ACNC: 1.06 IU/ML (ref 0.3–0.7)
WBC NRBC COR # BLD: 7.47 10*3/MM3 (ref 3.4–10.8)

## 2023-01-17 PROCEDURE — 25010000002 CEFTRIAXONE PER 250 MG: Performed by: INTERNAL MEDICINE

## 2023-01-17 PROCEDURE — 87206 SMEAR FLUORESCENT/ACID STAI: CPT | Performed by: PHYSICIAN ASSISTANT

## 2023-01-17 PROCEDURE — 88305 TISSUE EXAM BY PATHOLOGIST: CPT | Performed by: THORACIC SURGERY (CARDIOTHORACIC VASCULAR SURGERY)

## 2023-01-17 PROCEDURE — 87075 CULTR BACTERIA EXCEPT BLOOD: CPT | Performed by: PHYSICIAN ASSISTANT

## 2023-01-17 PROCEDURE — 87102 FUNGUS ISOLATION CULTURE: CPT | Performed by: PHYSICIAN ASSISTANT

## 2023-01-17 PROCEDURE — 25010000002 PROPOFOL 10 MG/ML EMULSION: Performed by: NURSE ANESTHETIST, CERTIFIED REGISTERED

## 2023-01-17 PROCEDURE — 28820 AMPUTATION OF TOE: CPT | Performed by: THORACIC SURGERY (CARDIOTHORACIC VASCULAR SURGERY)

## 2023-01-17 PROCEDURE — 87070 CULTURE OTHR SPECIMN AEROBIC: CPT | Performed by: PHYSICIAN ASSISTANT

## 2023-01-17 PROCEDURE — 0Y6S0Z0 DETACHMENT AT LEFT 2ND TOE, COMPLETE, OPEN APPROACH: ICD-10-PCS | Performed by: THORACIC SURGERY (CARDIOTHORACIC VASCULAR SURGERY)

## 2023-01-17 PROCEDURE — 82962 GLUCOSE BLOOD TEST: CPT

## 2023-01-17 PROCEDURE — 88311 DECALCIFY TISSUE: CPT | Performed by: THORACIC SURGERY (CARDIOTHORACIC VASCULAR SURGERY)

## 2023-01-17 PROCEDURE — 25010000002 DEXAMETHASONE PER 1 MG: Performed by: NURSE ANESTHETIST, CERTIFIED REGISTERED

## 2023-01-17 PROCEDURE — 85025 COMPLETE CBC W/AUTO DIFF WBC: CPT | Performed by: NURSE PRACTITIONER

## 2023-01-17 PROCEDURE — 63710000001 INSULIN LISPRO (HUMAN) PER 5 UNITS: Performed by: PHYSICIAN ASSISTANT

## 2023-01-17 PROCEDURE — 25010000002 FENTANYL CITRATE (PF) 50 MCG/ML SOLUTION: Performed by: NURSE ANESTHETIST, CERTIFIED REGISTERED

## 2023-01-17 PROCEDURE — 87116 MYCOBACTERIA CULTURE: CPT | Performed by: PHYSICIAN ASSISTANT

## 2023-01-17 PROCEDURE — 99232 SBSQ HOSP IP/OBS MODERATE 35: CPT | Performed by: INTERNAL MEDICINE

## 2023-01-17 PROCEDURE — 87147 CULTURE TYPE IMMUNOLOGIC: CPT | Performed by: PHYSICIAN ASSISTANT

## 2023-01-17 PROCEDURE — 99024 POSTOP FOLLOW-UP VISIT: CPT | Performed by: THORACIC SURGERY (CARDIOTHORACIC VASCULAR SURGERY)

## 2023-01-17 PROCEDURE — 25010000002 DAPTOMYCIN PER 1 MG: Performed by: INTERNAL MEDICINE

## 2023-01-17 PROCEDURE — 25010000002 CEFAZOLIN IN DEXTROSE 2-4 GM/100ML-% SOLUTION: Performed by: PHYSICIAN ASSISTANT

## 2023-01-17 PROCEDURE — 63710000001 INSULIN DETEMIR PER 5 UNITS: Performed by: PHYSICIAN ASSISTANT

## 2023-01-17 PROCEDURE — 87205 SMEAR GRAM STAIN: CPT | Performed by: PHYSICIAN ASSISTANT

## 2023-01-17 PROCEDURE — 25010000002 ONDANSETRON PER 1 MG: Performed by: NURSE ANESTHETIST, CERTIFIED REGISTERED

## 2023-01-17 PROCEDURE — 25010000002 DEXAMETHASONE SODIUM PHOSPHATE 10 MG/ML SOLUTION: Performed by: NURSE ANESTHETIST, CERTIFIED REGISTERED

## 2023-01-17 PROCEDURE — 85520 HEPARIN ASSAY: CPT

## 2023-01-17 PROCEDURE — 25010000002 CEFAZOLIN PER 500 MG: Performed by: NURSE ANESTHETIST, CERTIFIED REGISTERED

## 2023-01-17 PROCEDURE — 87176 TISSUE HOMOGENIZATION CULTR: CPT | Performed by: PHYSICIAN ASSISTANT

## 2023-01-17 RX ORDER — DEXAMETHASONE SODIUM PHOSPHATE 4 MG/ML
INJECTION, SOLUTION INTRA-ARTICULAR; INTRALESIONAL; INTRAMUSCULAR; INTRAVENOUS; SOFT TISSUE AS NEEDED
Status: DISCONTINUED | OUTPATIENT
Start: 2023-01-17 | End: 2023-01-17 | Stop reason: SURG

## 2023-01-17 RX ORDER — SODIUM CHLORIDE 9 MG/ML
40 INJECTION, SOLUTION INTRAVENOUS AS NEEDED
Status: DISCONTINUED | OUTPATIENT
Start: 2023-01-17 | End: 2023-01-17 | Stop reason: HOSPADM

## 2023-01-17 RX ORDER — CEFAZOLIN SODIUM 2 G/100ML
2 INJECTION, SOLUTION INTRAVENOUS EVERY 8 HOURS
Status: COMPLETED | OUTPATIENT
Start: 2023-01-17 | End: 2023-01-18

## 2023-01-17 RX ORDER — PHENYLEPHRINE HCL IN 0.9% NACL 1 MG/10 ML
SYRINGE (ML) INTRAVENOUS AS NEEDED
Status: DISCONTINUED | OUTPATIENT
Start: 2023-01-17 | End: 2023-01-17 | Stop reason: SURG

## 2023-01-17 RX ORDER — METRONIDAZOLE 500 MG/1
500 TABLET ORAL EVERY 8 HOURS SCHEDULED
Status: DISCONTINUED | OUTPATIENT
Start: 2023-01-17 | End: 2023-01-20

## 2023-01-17 RX ORDER — CEFAZOLIN SODIUM 1 G/3ML
INJECTION, POWDER, FOR SOLUTION INTRAMUSCULAR; INTRAVENOUS AS NEEDED
Status: DISCONTINUED | OUTPATIENT
Start: 2023-01-17 | End: 2023-01-17 | Stop reason: SURG

## 2023-01-17 RX ORDER — HEPARIN SODIUM 5000 [USP'U]/ML
5000 INJECTION, SOLUTION INTRAVENOUS; SUBCUTANEOUS EVERY 8 HOURS SCHEDULED
Status: DISCONTINUED | OUTPATIENT
Start: 2023-01-18 | End: 2023-01-22

## 2023-01-17 RX ORDER — SODIUM CHLORIDE 9 MG/ML
9 INJECTION, SOLUTION INTRAVENOUS CONTINUOUS
Status: DISCONTINUED | OUTPATIENT
Start: 2023-01-17 | End: 2023-01-23 | Stop reason: HOSPADM

## 2023-01-17 RX ORDER — ONDANSETRON 2 MG/ML
INJECTION INTRAMUSCULAR; INTRAVENOUS AS NEEDED
Status: DISCONTINUED | OUTPATIENT
Start: 2023-01-17 | End: 2023-01-17 | Stop reason: SURG

## 2023-01-17 RX ORDER — SODIUM CHLORIDE 9 MG/ML
INJECTION, SOLUTION INTRAVENOUS CONTINUOUS PRN
Status: DISCONTINUED | OUTPATIENT
Start: 2023-01-17 | End: 2023-01-17 | Stop reason: SURG

## 2023-01-17 RX ORDER — MIDAZOLAM HYDROCHLORIDE 1 MG/ML
0.5 INJECTION INTRAMUSCULAR; INTRAVENOUS
Status: DISCONTINUED | OUTPATIENT
Start: 2023-01-17 | End: 2023-01-17 | Stop reason: HOSPADM

## 2023-01-17 RX ORDER — HYDROCODONE BITARTRATE AND ACETAMINOPHEN 7.5; 325 MG/1; MG/1
1 TABLET ORAL EVERY 4 HOURS PRN
Status: DISCONTINUED | OUTPATIENT
Start: 2023-01-17 | End: 2023-01-23 | Stop reason: HOSPADM

## 2023-01-17 RX ORDER — FAMOTIDINE 20 MG/1
20 TABLET, FILM COATED ORAL ONCE
Status: COMPLETED | OUTPATIENT
Start: 2023-01-17 | End: 2023-01-17

## 2023-01-17 RX ORDER — LIDOCAINE HYDROCHLORIDE 10 MG/ML
0.5 INJECTION, SOLUTION EPIDURAL; INFILTRATION; INTRACAUDAL; PERINEURAL ONCE AS NEEDED
Status: DISCONTINUED | OUTPATIENT
Start: 2023-01-17 | End: 2023-01-17 | Stop reason: HOSPADM

## 2023-01-17 RX ORDER — MAGNESIUM HYDROXIDE 1200 MG/15ML
LIQUID ORAL AS NEEDED
Status: DISCONTINUED | OUTPATIENT
Start: 2023-01-17 | End: 2023-01-17 | Stop reason: HOSPADM

## 2023-01-17 RX ORDER — DEXAMETHASONE SODIUM PHOSPHATE 10 MG/ML
INJECTION, SOLUTION INTRAMUSCULAR; INTRAVENOUS
Status: COMPLETED | OUTPATIENT
Start: 2023-01-17 | End: 2023-01-17

## 2023-01-17 RX ORDER — LIDOCAINE HYDROCHLORIDE 10 MG/ML
INJECTION, SOLUTION EPIDURAL; INFILTRATION; INTRACAUDAL; PERINEURAL AS NEEDED
Status: DISCONTINUED | OUTPATIENT
Start: 2023-01-17 | End: 2023-01-17 | Stop reason: SURG

## 2023-01-17 RX ORDER — FENTANYL CITRATE 50 UG/ML
50 INJECTION, SOLUTION INTRAMUSCULAR; INTRAVENOUS
Status: DISCONTINUED | OUTPATIENT
Start: 2023-01-17 | End: 2023-01-17 | Stop reason: HOSPADM

## 2023-01-17 RX ORDER — SODIUM CHLORIDE 0.9 % (FLUSH) 0.9 %
10 SYRINGE (ML) INJECTION AS NEEDED
Status: DISCONTINUED | OUTPATIENT
Start: 2023-01-17 | End: 2023-01-17 | Stop reason: HOSPADM

## 2023-01-17 RX ORDER — FENTANYL CITRATE 50 UG/ML
INJECTION, SOLUTION INTRAMUSCULAR; INTRAVENOUS
Status: COMPLETED | OUTPATIENT
Start: 2023-01-17 | End: 2023-01-17

## 2023-01-17 RX ORDER — HYDROMORPHONE HYDROCHLORIDE 1 MG/ML
0.5 INJECTION, SOLUTION INTRAMUSCULAR; INTRAVENOUS; SUBCUTANEOUS
Status: DISCONTINUED | OUTPATIENT
Start: 2023-01-17 | End: 2023-01-17 | Stop reason: HOSPADM

## 2023-01-17 RX ORDER — CEFAZOLIN SODIUM 2 G/100ML
2 INJECTION, SOLUTION INTRAVENOUS ONCE
Status: DISCONTINUED | OUTPATIENT
Start: 2023-01-17 | End: 2023-01-17 | Stop reason: HOSPADM

## 2023-01-17 RX ORDER — PROPOFOL 10 MG/ML
VIAL (ML) INTRAVENOUS AS NEEDED
Status: DISCONTINUED | OUTPATIENT
Start: 2023-01-17 | End: 2023-01-17 | Stop reason: SURG

## 2023-01-17 RX ORDER — MORPHINE SULFATE 2 MG/ML
2 INJECTION, SOLUTION INTRAMUSCULAR; INTRAVENOUS
Status: DISCONTINUED | OUTPATIENT
Start: 2023-01-17 | End: 2023-01-23 | Stop reason: HOSPADM

## 2023-01-17 RX ORDER — SODIUM CHLORIDE, SODIUM LACTATE, POTASSIUM CHLORIDE, CALCIUM CHLORIDE 600; 310; 30; 20 MG/100ML; MG/100ML; MG/100ML; MG/100ML
9 INJECTION, SOLUTION INTRAVENOUS CONTINUOUS
Status: DISCONTINUED | OUTPATIENT
Start: 2023-01-17 | End: 2023-01-21

## 2023-01-17 RX ORDER — EPHEDRINE SULFATE 50 MG/ML
INJECTION INTRAVENOUS AS NEEDED
Status: DISCONTINUED | OUTPATIENT
Start: 2023-01-17 | End: 2023-01-17 | Stop reason: SURG

## 2023-01-17 RX ORDER — BUPIVACAINE HYDROCHLORIDE 2.5 MG/ML
INJECTION, SOLUTION EPIDURAL; INFILTRATION; INTRACAUDAL
Status: COMPLETED | OUTPATIENT
Start: 2023-01-17 | End: 2023-01-17

## 2023-01-17 RX ADMIN — CARVEDILOL 3.12 MG: 3.12 TABLET, FILM COATED ORAL at 16:51

## 2023-01-17 RX ADMIN — Medication 100 MCG: at 07:39

## 2023-01-17 RX ADMIN — BUMETANIDE 1 MG: 1 TABLET ORAL at 10:55

## 2023-01-17 RX ADMIN — ACETAMINOPHEN 650 MG: 325 TABLET ORAL at 11:03

## 2023-01-17 RX ADMIN — EPHEDRINE SULFATE 10 MG: 50 INJECTION INTRAVENOUS at 08:07

## 2023-01-17 RX ADMIN — ONDANSETRON 4 MG: 2 INJECTION INTRAMUSCULAR; INTRAVENOUS at 08:10

## 2023-01-17 RX ADMIN — EPHEDRINE SULFATE 10 MG: 50 INJECTION INTRAVENOUS at 08:10

## 2023-01-17 RX ADMIN — CEFAZOLIN SODIUM 1 G: 1 INJECTION, POWDER, FOR SOLUTION INTRAMUSCULAR; INTRAVENOUS at 07:45

## 2023-01-17 RX ADMIN — INSULIN LISPRO 6 UNITS: 100 INJECTION, SOLUTION INTRAVENOUS; SUBCUTANEOUS at 16:51

## 2023-01-17 RX ADMIN — Medication 100 MCG: at 07:40

## 2023-01-17 RX ADMIN — Medication 100 MCG: at 08:10

## 2023-01-17 RX ADMIN — DAPTOMYCIN 550 MG: 500 INJECTION, POWDER, LYOPHILIZED, FOR SOLUTION INTRAVENOUS at 16:50

## 2023-01-17 RX ADMIN — ESCITALOPRAM OXALATE 5 MG: 10 TABLET ORAL at 10:55

## 2023-01-17 RX ADMIN — NYSTATIN 1 APPLICATION: 100000 POWDER TOPICAL at 14:24

## 2023-01-17 RX ADMIN — FENTANYL CITRATE 100 MCG: 50 INJECTION, SOLUTION INTRAMUSCULAR; INTRAVENOUS at 06:58

## 2023-01-17 RX ADMIN — ASPIRIN 81 MG CHEWABLE TABLET 81 MG: 81 TABLET CHEWABLE at 10:55

## 2023-01-17 RX ADMIN — SODIUM CHLORIDE 9 ML/HR: 9 INJECTION, SOLUTION INTRAVENOUS at 07:15

## 2023-01-17 RX ADMIN — FAMOTIDINE 20 MG: 20 TABLET, FILM COATED ORAL at 10:57

## 2023-01-17 RX ADMIN — Medication 100 MCG: at 07:50

## 2023-01-17 RX ADMIN — Medication 100 MCG: at 08:04

## 2023-01-17 RX ADMIN — INSULIN LISPRO 4 UNITS: 100 INJECTION, SOLUTION INTRAVENOUS; SUBCUTANEOUS at 21:30

## 2023-01-17 RX ADMIN — Medication 100 MCG: at 07:53

## 2023-01-17 RX ADMIN — FAMOTIDINE 20 MG: 20 TABLET, FILM COATED ORAL at 07:13

## 2023-01-17 RX ADMIN — DEXAMETHASONE SODIUM PHOSPHATE 2 MG: 10 INJECTION, SOLUTION INTRAMUSCULAR; INTRAVENOUS at 06:58

## 2023-01-17 RX ADMIN — CEFAZOLIN SODIUM 2 G: 2 INJECTION, SOLUTION INTRAVENOUS at 16:13

## 2023-01-17 RX ADMIN — MAGNESIUM OXIDE TAB 400 MG (241.3 MG ELEMENTAL MG) 400 MG: 400 (241.3 MG) TAB at 10:55

## 2023-01-17 RX ADMIN — CEFTRIAXONE SODIUM 2 G: 2 INJECTION, POWDER, FOR SOLUTION INTRAMUSCULAR; INTRAVENOUS at 17:33

## 2023-01-17 RX ADMIN — SACUBITRIL AND VALSARTAN 1 TABLET: 24; 26 TABLET, FILM COATED ORAL at 21:30

## 2023-01-17 RX ADMIN — SODIUM CHLORIDE: 9 INJECTION, SOLUTION INTRAVENOUS at 07:40

## 2023-01-17 RX ADMIN — INSULIN DETEMIR 4 UNITS: 100 INJECTION, SOLUTION SUBCUTANEOUS at 10:56

## 2023-01-17 RX ADMIN — BUPIVACAINE HYDROCHLORIDE 30 ML: 2.5 INJECTION, SOLUTION EPIDURAL; INFILTRATION; INTRACAUDAL; PERINEURAL at 06:58

## 2023-01-17 RX ADMIN — EPHEDRINE SULFATE 10 MG: 50 INJECTION INTRAVENOUS at 07:56

## 2023-01-17 RX ADMIN — Medication 1 CAPSULE: at 10:54

## 2023-01-17 RX ADMIN — METRONIDAZOLE 500 MG: 500 TABLET ORAL at 21:29

## 2023-01-17 RX ADMIN — SENNOSIDES AND DOCUSATE SODIUM 2 TABLET: 50; 8.6 TABLET ORAL at 21:29

## 2023-01-17 RX ADMIN — ATORVASTATIN CALCIUM 20 MG: 20 TABLET, FILM COATED ORAL at 21:30

## 2023-01-17 RX ADMIN — Medication 5000 UNITS: at 10:54

## 2023-01-17 RX ADMIN — BUMETANIDE 1 MG: 1 TABLET ORAL at 21:29

## 2023-01-17 RX ADMIN — DEXAMETHASONE SODIUM PHOSPHATE 8 MG: 4 INJECTION, SOLUTION INTRAMUSCULAR; INTRAVENOUS at 07:43

## 2023-01-17 RX ADMIN — LIDOCAINE HYDROCHLORIDE 50 MG: 10 INJECTION, SOLUTION EPIDURAL; INFILTRATION; INTRACAUDAL; PERINEURAL at 07:39

## 2023-01-17 RX ADMIN — INSULIN LISPRO 4 UNITS: 100 INJECTION, SOLUTION INTRAVENOUS; SUBCUTANEOUS at 12:04

## 2023-01-17 RX ADMIN — PROPOFOL 50 MG: 10 INJECTION, EMULSION INTRAVENOUS at 07:50

## 2023-01-17 RX ADMIN — Medication 10 ML: at 21:30

## 2023-01-17 RX ADMIN — PROPOFOL 100 MG: 10 INJECTION, EMULSION INTRAVENOUS at 07:39

## 2023-01-17 RX ADMIN — TAMSULOSIN HYDROCHLORIDE 0.4 MG: 0.4 CAPSULE ORAL at 10:56

## 2023-01-17 RX ADMIN — EPHEDRINE SULFATE 10 MG: 50 INJECTION INTRAVENOUS at 08:05

## 2023-01-17 RX ADMIN — Medication 100 MCG: at 07:52

## 2023-01-17 RX ADMIN — METRONIDAZOLE 500 MG: 500 TABLET ORAL at 16:13

## 2023-01-17 RX ADMIN — NYSTATIN 1 APPLICATION: 100000 POWDER TOPICAL at 21:31

## 2023-01-17 NOTE — ANESTHESIA PREPROCEDURE EVALUATION
Anesthesia Evaluation     Patient summary reviewed and Nursing notes reviewed   NPO Solid Status: > 8 hours  NPO Liquid Status: > 2 hours           Airway   Mallampati: II  TM distance: >3 FB  Neck ROM: full  No difficulty expected  Dental      Pulmonary     breath sounds clear to auscultation  Cardiovascular     ECG reviewed  Rhythm: regular  Rate: normal    (+) pacemaker pacemaker, hypertension, CAD, CABG >6 Months, angina, PVD, hyperlipidemia,       Neuro/Psych  GI/Hepatic/Renal/Endo    (+)  GERD,  renal disease CRI, diabetes mellitus, thyroid problem     Musculoskeletal     Abdominal    Substance History      OB/GYN          Other        ROS/Med Hx Other: ? Patient experienced nausea, lightheadedness, and dizziness after lexiscan injection. Pt reported chestt tightness in recovery rated 6/10 in the center of the chest. Symptoms improved with the administration of IV caffeine.  ? Paced with PVCs throughout the test.  ? Nonspecific ST-T changes in lead III in recovery.  ? Left ventricular ejection fraction is moderately reduced. (Calculated EF = 42%).  ? Stress and rest images reveal a fixed anteroseptal defect  ? No mismatched isotope defects are noted  ? Study is consistent with previous anterior wall myocardial infarction.  ? No significant ischemic distributions noted on the study                    Anesthesia Plan    ASA 3     general with block     intravenous induction     Anesthetic plan, risks, benefits, and alternatives have been provided, discussed and informed consent has been obtained with: patient.    Plan discussed with CRNA.        CODE STATUS:    Code Status (Patient has no pulse and is not breathing): CPR (Attempt to Resuscitate)  Medical Interventions (Patient has pulse or is breathing): Full Support

## 2023-01-17 NOTE — ANESTHESIA PROCEDURE NOTES
Peripheral Block      Patient reassessed immediately prior to procedure    Patient location during procedure: pre-op  Reason for block: at surgeon's request and post-op pain management  Performed by  CRNA/CAA: Shirin Sanders CRNA  Assisted by: Catrina Dominguez RN  Preanesthetic Checklist  Completed: patient identified, IV checked, site marked, risks and benefits discussed, surgical consent, monitors and equipment checked, pre-op evaluation and timeout performed  Prep:  Sterile barriers:cap, gloves, mask and washed/disinfected hands  Prep: ChloraPrep  Patient monitoring: blood pressure monitoring, continuous pulse oximetry and EKG  Procedure    Sedation: yes  Performed under: local infiltration  Guidance:ultrasound guided    ULTRASOUND INTERPRETATION.  Using ultrasound guidance a 20 G gauge needle was placed in close proximity to the nerve, at which point, under ultrasound guidance anesthetic was injected in the area of the nerve and spread of the anesthesia was seen on ultrasound in close proximity thereto.  There were no abnormalities seen on ultrasound; a digital image was taken; and the patient tolerated the procedure with no complications. Images:still images obtained, printed/placed on chart    Laterality:left  Block Type:popliteal  Injection Technique:single-shot  Needle Type:echogenic and Tuohy  Needle Gauge:20 G  Resistance on Injection: none  Catheter Size:20 G    Medications Used: fentaNYL citrate (PF) (SUBLIMAZE) injection - Intravenous   100 mcg - 1/17/2023 6:58:00 AM  dexamethasone sodium phosphate injection - Injection   2 mg - 1/17/2023 6:58:00 AM  bupivacaine PF (MARCAINE) 0.25 % injection - Injection   30 mL - 1/17/2023 6:58:00 AM      Post Assessment  Injection Assessment: negative aspiration for heme, no paresthesia on injection and incremental injection  Patient Tolerance:comfortable throughout block  Complications:no  Additional Notes  SINGLE shot    A high-frequency linear transducer,  "with sterile cover, was placed in the popliteal fossa to identify the popliteal artery and vein, Tibial nerve (TN) and Common Peroneal nerve (CP). The transducer was then moved in a cephalad fashion to observe the TN and CP nerve bifurcation to form the Sciatic Nerve. The insertion site was prepped and draped in sterile fashion. Skin and cutaneous tissue was infiltrated with 2-5 ml of 1% Lidocaine. Using ultrasound-guidance, a 20-gauge B-[place] 4\" Ultraplex 360 non-stimulating echogenic needle was then inserted and advanced in plane from lateral to medial. Preservative-free normal saline was utilized for hydro-dissection of tissue, advancement of Touhy, and to confirm final needle placement posterior to the nerves. Local anesthetic injection spread, in incremental 3-5 ml injections, to surround both nerve structures. Aspiration every 5 ml to prevent intravascular injection. Injection was completed with negative aspiration of blood and negative intravascular injection. Injection pressures were normal with minimal resistance            "

## 2023-01-17 NOTE — ANESTHESIA PROCEDURE NOTES
Airway  Urgency: elective    Date/Time: 1/17/2023 7:42 AM  Airway not difficult    General Information and Staff    Patient location during procedure: OR  CRNA/CAA: Margaret Mckee CRNA    Indications and Patient Condition  Indications for airway management: airway protection    Preoxygenated: yes  Mask difficulty assessment: 1 - vent by mask    Final Airway Details  Final airway type: supraglottic airway      Successful airway: I-gel  Size 5     Number of attempts at approach: 1  Assessment: lips, teeth, and gum same as pre-op    Additional Comments  LMA placed without difficulty, ventilation with assist, equal breath sounds and symmetric chest rise and fall

## 2023-01-17 NOTE — ANESTHESIA POSTPROCEDURE EVALUATION
Patient: Jermaine Singh    Procedure Summary     Date: 01/17/23 Room / Location:  [facility] /  [facility]    Anesthesia Start: 0734 Anesthesia Stop: 0827    Procedure: AMPUTATION DIGIT LEFT (Left: Toes) Diagnosis:       Subacute osteomyelitis of left foot (HCC)      (Subacute osteomyelitis of left foot (HCC) [M86.272])    Surgeons: Gopal Márquez MD Provider: Ted Ruiz MD    Anesthesia Type: general with block ASA Status: 3          Anesthesia Type: general with block    Vitals  No vitals data found for the desired time range.          Post Anesthesia Care and Evaluation    Patient location during evaluation: PACU  Patient participation: complete - patient participated  Level of consciousness: awake and alert  Pain management: adequate    Airway patency: patent  Anesthetic complications: No anesthetic complications  PONV Status: none  Cardiovascular status: hemodynamically stable and acceptable  Respiratory status: nonlabored ventilation, acceptable and nasal cannula  Hydration status: acceptable    Comments: spo2 99  bp 99/64  Hr 65

## 2023-01-18 LAB
ANION GAP SERPL CALCULATED.3IONS-SCNC: 11 MMOL/L (ref 5–15)
BASOPHILS # BLD AUTO: 0.01 10*3/MM3 (ref 0–0.2)
BASOPHILS NFR BLD AUTO: 0.1 % (ref 0–1.5)
BUN SERPL-MCNC: 57 MG/DL (ref 8–23)
BUN/CREAT SERPL: 31.5 (ref 7–25)
CALCIUM SPEC-SCNC: 8.8 MG/DL (ref 8.6–10.5)
CHLORIDE SERPL-SCNC: 102 MMOL/L (ref 98–107)
CK SERPL-CCNC: 78 U/L (ref 20–200)
CO2 SERPL-SCNC: 23 MMOL/L (ref 22–29)
CREAT SERPL-MCNC: 1.81 MG/DL (ref 0.76–1.27)
CYTO UR: NORMAL
DEPRECATED RDW RBC AUTO: 50.2 FL (ref 37–54)
EGFRCR SERPLBLD CKD-EPI 2021: 38 ML/MIN/1.73
EOSINOPHIL # BLD AUTO: 0 10*3/MM3 (ref 0–0.4)
EOSINOPHIL NFR BLD AUTO: 0 % (ref 0.3–6.2)
ERYTHROCYTE [DISTWIDTH] IN BLOOD BY AUTOMATED COUNT: 15.4 % (ref 12.3–15.4)
GLUCOSE BLDC GLUCOMTR-MCNC: 207 MG/DL (ref 70–130)
GLUCOSE BLDC GLUCOMTR-MCNC: 217 MG/DL (ref 70–130)
GLUCOSE BLDC GLUCOMTR-MCNC: 252 MG/DL (ref 70–130)
GLUCOSE BLDC GLUCOMTR-MCNC: 265 MG/DL (ref 70–130)
GLUCOSE SERPL-MCNC: 250 MG/DL (ref 65–99)
HCT VFR BLD AUTO: 31.4 % (ref 37.5–51)
HGB BLD-MCNC: 10.1 G/DL (ref 13–17.7)
IMM GRANULOCYTES # BLD AUTO: 0.03 10*3/MM3 (ref 0–0.05)
IMM GRANULOCYTES NFR BLD AUTO: 0.4 % (ref 0–0.5)
LAB AP CASE REPORT: NORMAL
LAB AP CLINICAL INFORMATION: NORMAL
LYMPHOCYTES # BLD AUTO: 0.68 10*3/MM3 (ref 0.7–3.1)
LYMPHOCYTES NFR BLD AUTO: 8.5 % (ref 19.6–45.3)
MAGNESIUM SERPL-MCNC: 2 MG/DL (ref 1.6–2.4)
MCH RBC QN AUTO: 28.5 PG (ref 26.6–33)
MCHC RBC AUTO-ENTMCNC: 32.2 G/DL (ref 31.5–35.7)
MCV RBC AUTO: 88.7 FL (ref 79–97)
MONOCYTES # BLD AUTO: 0.48 10*3/MM3 (ref 0.1–0.9)
MONOCYTES NFR BLD AUTO: 6 % (ref 5–12)
NEUTROPHILS NFR BLD AUTO: 6.78 10*3/MM3 (ref 1.7–7)
NEUTROPHILS NFR BLD AUTO: 85 % (ref 42.7–76)
NRBC BLD AUTO-RTO: 0 /100 WBC (ref 0–0.2)
PATH REPORT.FINAL DX SPEC: NORMAL
PATH REPORT.GROSS SPEC: NORMAL
PHOSPHATE SERPL-MCNC: 3.5 MG/DL (ref 2.5–4.5)
PLATELET # BLD AUTO: 136 10*3/MM3 (ref 140–450)
PMV BLD AUTO: 13.7 FL (ref 6–12)
POTASSIUM SERPL-SCNC: 4.5 MMOL/L (ref 3.5–5.2)
RBC # BLD AUTO: 3.54 10*6/MM3 (ref 4.14–5.8)
SODIUM SERPL-SCNC: 136 MMOL/L (ref 136–145)
WBC NRBC COR # BLD: 7.98 10*3/MM3 (ref 3.4–10.8)

## 2023-01-18 PROCEDURE — 63710000001 INSULIN DETEMIR PER 5 UNITS: Performed by: INTERNAL MEDICINE

## 2023-01-18 PROCEDURE — 80048 BASIC METABOLIC PNL TOTAL CA: CPT | Performed by: PHYSICIAN ASSISTANT

## 2023-01-18 PROCEDURE — 84100 ASSAY OF PHOSPHORUS: CPT | Performed by: INTERNAL MEDICINE

## 2023-01-18 PROCEDURE — 25010000002 CEFAZOLIN IN DEXTROSE 2-4 GM/100ML-% SOLUTION: Performed by: PHYSICIAN ASSISTANT

## 2023-01-18 PROCEDURE — 99232 SBSQ HOSP IP/OBS MODERATE 35: CPT | Performed by: INTERNAL MEDICINE

## 2023-01-18 PROCEDURE — 83735 ASSAY OF MAGNESIUM: CPT | Performed by: INTERNAL MEDICINE

## 2023-01-18 PROCEDURE — 82962 GLUCOSE BLOOD TEST: CPT

## 2023-01-18 PROCEDURE — 82550 ASSAY OF CK (CPK): CPT

## 2023-01-18 PROCEDURE — 25010000002 CEFTRIAXONE PER 250 MG: Performed by: INTERNAL MEDICINE

## 2023-01-18 PROCEDURE — 25010000002 HEPARIN (PORCINE) PER 1000 UNITS: Performed by: PHYSICIAN ASSISTANT

## 2023-01-18 PROCEDURE — 99231 SBSQ HOSP IP/OBS SF/LOW 25: CPT | Performed by: THORACIC SURGERY (CARDIOTHORACIC VASCULAR SURGERY)

## 2023-01-18 PROCEDURE — 25010000002 DAPTOMYCIN PER 1 MG: Performed by: INTERNAL MEDICINE

## 2023-01-18 PROCEDURE — 85025 COMPLETE CBC W/AUTO DIFF WBC: CPT | Performed by: PHYSICIAN ASSISTANT

## 2023-01-18 PROCEDURE — 63710000001 INSULIN LISPRO (HUMAN) PER 5 UNITS: Performed by: PHYSICIAN ASSISTANT

## 2023-01-18 PROCEDURE — 97161 PT EVAL LOW COMPLEX 20 MIN: CPT | Performed by: PHYSICAL THERAPIST

## 2023-01-18 PROCEDURE — 97530 THERAPEUTIC ACTIVITIES: CPT | Performed by: PHYSICAL THERAPIST

## 2023-01-18 RX ADMIN — METRONIDAZOLE 500 MG: 500 TABLET ORAL at 20:50

## 2023-01-18 RX ADMIN — TAMSULOSIN HYDROCHLORIDE 0.4 MG: 0.4 CAPSULE ORAL at 10:25

## 2023-01-18 RX ADMIN — FAMOTIDINE 20 MG: 20 TABLET, FILM COATED ORAL at 10:23

## 2023-01-18 RX ADMIN — MAGNESIUM OXIDE TAB 400 MG (241.3 MG ELEMENTAL MG) 400 MG: 400 (241.3 MG) TAB at 10:23

## 2023-01-18 RX ADMIN — ESCITALOPRAM OXALATE 5 MG: 10 TABLET ORAL at 10:22

## 2023-01-18 RX ADMIN — HEPARIN SODIUM 5000 UNITS: 5000 INJECTION INTRAVENOUS; SUBCUTANEOUS at 13:55

## 2023-01-18 RX ADMIN — BUMETANIDE 1 MG: 1 TABLET ORAL at 20:51

## 2023-01-18 RX ADMIN — METRONIDAZOLE 500 MG: 500 TABLET ORAL at 06:16

## 2023-01-18 RX ADMIN — Medication 1 CAPSULE: at 10:25

## 2023-01-18 RX ADMIN — INSULIN DETEMIR 10 UNITS: 100 INJECTION, SOLUTION SUBCUTANEOUS at 10:18

## 2023-01-18 RX ADMIN — BUMETANIDE 1 MG: 1 TABLET ORAL at 10:19

## 2023-01-18 RX ADMIN — METRONIDAZOLE 500 MG: 500 TABLET ORAL at 13:56

## 2023-01-18 RX ADMIN — CEFAZOLIN SODIUM 2 G: 2 INJECTION, SOLUTION INTRAVENOUS at 00:07

## 2023-01-18 RX ADMIN — INSULIN LISPRO 4 UNITS: 100 INJECTION, SOLUTION INTRAVENOUS; SUBCUTANEOUS at 17:29

## 2023-01-18 RX ADMIN — CARVEDILOL 3.12 MG: 3.12 TABLET, FILM COATED ORAL at 10:23

## 2023-01-18 RX ADMIN — LEVOTHYROXINE SODIUM 88 MCG: 88 TABLET ORAL at 05:12

## 2023-01-18 RX ADMIN — Medication 10 ML: at 10:24

## 2023-01-18 RX ADMIN — CEFTRIAXONE SODIUM 2 G: 2 INJECTION, POWDER, FOR SOLUTION INTRAMUSCULAR; INTRAVENOUS at 18:59

## 2023-01-18 RX ADMIN — NYSTATIN: 100000 POWDER TOPICAL at 13:57

## 2023-01-18 RX ADMIN — Medication 5000 UNITS: at 13:56

## 2023-01-18 RX ADMIN — SENNOSIDES AND DOCUSATE SODIUM 2 TABLET: 50; 8.6 TABLET ORAL at 10:23

## 2023-01-18 RX ADMIN — HEPARIN SODIUM 5000 UNITS: 5000 INJECTION INTRAVENOUS; SUBCUTANEOUS at 05:12

## 2023-01-18 RX ADMIN — INSULIN LISPRO 5 UNITS: 100 INJECTION, SOLUTION INTRAVENOUS; SUBCUTANEOUS at 10:18

## 2023-01-18 RX ADMIN — ATORVASTATIN CALCIUM 20 MG: 20 TABLET, FILM COATED ORAL at 20:50

## 2023-01-18 RX ADMIN — DAPTOMYCIN 550 MG: 500 INJECTION, POWDER, LYOPHILIZED, FOR SOLUTION INTRAVENOUS at 17:31

## 2023-01-18 RX ADMIN — NYSTATIN: 100000 POWDER TOPICAL at 05:13

## 2023-01-18 RX ADMIN — ASPIRIN 81 MG CHEWABLE TABLET 81 MG: 81 TABLET CHEWABLE at 10:23

## 2023-01-18 RX ADMIN — SACUBITRIL AND VALSARTAN 1 TABLET: 24; 26 TABLET, FILM COATED ORAL at 10:23

## 2023-01-19 LAB
BACTERIA SPEC AEROBE CULT: ABNORMAL
BACTERIA SPEC AEROBE CULT: ABNORMAL
GLUCOSE BLDC GLUCOMTR-MCNC: 135 MG/DL (ref 70–130)
GLUCOSE BLDC GLUCOMTR-MCNC: 148 MG/DL (ref 70–130)
GLUCOSE BLDC GLUCOMTR-MCNC: 166 MG/DL (ref 70–130)
GLUCOSE BLDC GLUCOMTR-MCNC: 284 MG/DL (ref 70–130)
GRAM STN SPEC: ABNORMAL

## 2023-01-19 PROCEDURE — 97116 GAIT TRAINING THERAPY: CPT | Performed by: PHYSICAL THERAPIST

## 2023-01-19 PROCEDURE — 63710000001 INSULIN DETEMIR PER 5 UNITS: Performed by: INTERNAL MEDICINE

## 2023-01-19 PROCEDURE — 63710000001 INSULIN LISPRO (HUMAN) PER 5 UNITS: Performed by: PHYSICIAN ASSISTANT

## 2023-01-19 PROCEDURE — 99232 SBSQ HOSP IP/OBS MODERATE 35: CPT | Performed by: FAMILY MEDICINE

## 2023-01-19 PROCEDURE — 82962 GLUCOSE BLOOD TEST: CPT

## 2023-01-19 PROCEDURE — 25010000002 HEPARIN (PORCINE) PER 1000 UNITS: Performed by: PHYSICIAN ASSISTANT

## 2023-01-19 PROCEDURE — 25010000002 CEFTRIAXONE PER 250 MG: Performed by: INTERNAL MEDICINE

## 2023-01-19 PROCEDURE — 99231 SBSQ HOSP IP/OBS SF/LOW 25: CPT | Performed by: THORACIC SURGERY (CARDIOTHORACIC VASCULAR SURGERY)

## 2023-01-19 PROCEDURE — 25010000002 DAPTOMYCIN PER 1 MG: Performed by: INTERNAL MEDICINE

## 2023-01-19 PROCEDURE — 97110 THERAPEUTIC EXERCISES: CPT | Performed by: PHYSICAL THERAPIST

## 2023-01-19 RX ADMIN — METRONIDAZOLE 500 MG: 500 TABLET ORAL at 13:04

## 2023-01-19 RX ADMIN — NYSTATIN: 100000 POWDER TOPICAL at 21:11

## 2023-01-19 RX ADMIN — INSULIN DETEMIR 10 UNITS: 100 INJECTION, SOLUTION SUBCUTANEOUS at 08:26

## 2023-01-19 RX ADMIN — CARVEDILOL 3.12 MG: 3.12 TABLET, FILM COATED ORAL at 08:23

## 2023-01-19 RX ADMIN — Medication 1 CAPSULE: at 08:25

## 2023-01-19 RX ADMIN — SENNOSIDES AND DOCUSATE SODIUM 2 TABLET: 50; 8.6 TABLET ORAL at 21:07

## 2023-01-19 RX ADMIN — LEVOTHYROXINE SODIUM 88 MCG: 88 TABLET ORAL at 06:27

## 2023-01-19 RX ADMIN — ACETAMINOPHEN 650 MG: 325 TABLET ORAL at 12:39

## 2023-01-19 RX ADMIN — CARVEDILOL 3.12 MG: 3.12 TABLET, FILM COATED ORAL at 17:12

## 2023-01-19 RX ADMIN — ESCITALOPRAM OXALATE 5 MG: 10 TABLET ORAL at 08:24

## 2023-01-19 RX ADMIN — BUMETANIDE 1 MG: 1 TABLET ORAL at 21:07

## 2023-01-19 RX ADMIN — INSULIN LISPRO 2 UNITS: 100 INJECTION, SOLUTION INTRAVENOUS; SUBCUTANEOUS at 08:24

## 2023-01-19 RX ADMIN — FAMOTIDINE 20 MG: 20 TABLET, FILM COATED ORAL at 08:23

## 2023-01-19 RX ADMIN — Medication 10 ML: at 21:19

## 2023-01-19 RX ADMIN — HEPARIN SODIUM 5000 UNITS: 5000 INJECTION INTRAVENOUS; SUBCUTANEOUS at 13:04

## 2023-01-19 RX ADMIN — ATORVASTATIN CALCIUM 20 MG: 20 TABLET, FILM COATED ORAL at 21:07

## 2023-01-19 RX ADMIN — CEFTRIAXONE SODIUM 2 G: 2 INJECTION, POWDER, FOR SOLUTION INTRAMUSCULAR; INTRAVENOUS at 17:12

## 2023-01-19 RX ADMIN — DAPTOMYCIN 550 MG: 500 INJECTION, POWDER, LYOPHILIZED, FOR SOLUTION INTRAVENOUS at 17:12

## 2023-01-19 RX ADMIN — NYSTATIN: 100000 POWDER TOPICAL at 13:04

## 2023-01-19 RX ADMIN — SENNOSIDES AND DOCUSATE SODIUM 2 TABLET: 50; 8.6 TABLET ORAL at 08:25

## 2023-01-19 RX ADMIN — MAGNESIUM OXIDE TAB 400 MG (241.3 MG ELEMENTAL MG) 400 MG: 400 (241.3 MG) TAB at 08:25

## 2023-01-19 RX ADMIN — TAMSULOSIN HYDROCHLORIDE 0.4 MG: 0.4 CAPSULE ORAL at 08:23

## 2023-01-19 RX ADMIN — METRONIDAZOLE 500 MG: 500 TABLET ORAL at 06:27

## 2023-01-19 RX ADMIN — INSULIN LISPRO 6 UNITS: 100 INJECTION, SOLUTION INTRAVENOUS; SUBCUTANEOUS at 21:17

## 2023-01-19 RX ADMIN — SACUBITRIL AND VALSARTAN 1 TABLET: 24; 26 TABLET, FILM COATED ORAL at 08:25

## 2023-01-19 RX ADMIN — METRONIDAZOLE 500 MG: 500 TABLET ORAL at 21:07

## 2023-01-19 RX ADMIN — Medication 10 ML: at 08:26

## 2023-01-19 RX ADMIN — BUMETANIDE 1 MG: 1 TABLET ORAL at 08:25

## 2023-01-20 ENCOUNTER — APPOINTMENT (OUTPATIENT)
Dept: GENERAL RADIOLOGY | Facility: HOSPITAL | Age: 78
DRG: 617 | End: 2023-01-20
Payer: MEDICARE

## 2023-01-20 LAB
ANION GAP SERPL CALCULATED.3IONS-SCNC: 11 MMOL/L (ref 5–15)
BASOPHILS # BLD AUTO: 0.05 10*3/MM3 (ref 0–0.2)
BASOPHILS NFR BLD AUTO: 0.6 % (ref 0–1.5)
BUN SERPL-MCNC: 55 MG/DL (ref 8–23)
BUN/CREAT SERPL: 32.5 (ref 7–25)
CALCIUM SPEC-SCNC: 8.8 MG/DL (ref 8.6–10.5)
CHLORIDE SERPL-SCNC: 102 MMOL/L (ref 98–107)
CO2 SERPL-SCNC: 26 MMOL/L (ref 22–29)
CREAT SERPL-MCNC: 1.69 MG/DL (ref 0.76–1.27)
DEPRECATED RDW RBC AUTO: 52.3 FL (ref 37–54)
EGFRCR SERPLBLD CKD-EPI 2021: 41.3 ML/MIN/1.73
EOSINOPHIL # BLD AUTO: 0.35 10*3/MM3 (ref 0–0.4)
EOSINOPHIL NFR BLD AUTO: 4.3 % (ref 0.3–6.2)
ERYTHROCYTE [DISTWIDTH] IN BLOOD BY AUTOMATED COUNT: 15.8 % (ref 12.3–15.4)
GLUCOSE BLDC GLUCOMTR-MCNC: 132 MG/DL (ref 70–130)
GLUCOSE BLDC GLUCOMTR-MCNC: 180 MG/DL (ref 70–130)
GLUCOSE BLDC GLUCOMTR-MCNC: 182 MG/DL (ref 70–130)
GLUCOSE BLDC GLUCOMTR-MCNC: 209 MG/DL (ref 70–130)
GLUCOSE SERPL-MCNC: 172 MG/DL (ref 65–99)
HCT VFR BLD AUTO: 36 % (ref 37.5–51)
HGB BLD-MCNC: 11.5 G/DL (ref 13–17.7)
IMM GRANULOCYTES # BLD AUTO: 0.03 10*3/MM3 (ref 0–0.05)
IMM GRANULOCYTES NFR BLD AUTO: 0.4 % (ref 0–0.5)
LYMPHOCYTES # BLD AUTO: 1.36 10*3/MM3 (ref 0.7–3.1)
LYMPHOCYTES NFR BLD AUTO: 16.7 % (ref 19.6–45.3)
MCH RBC QN AUTO: 28.6 PG (ref 26.6–33)
MCHC RBC AUTO-ENTMCNC: 31.9 G/DL (ref 31.5–35.7)
MCV RBC AUTO: 89.6 FL (ref 79–97)
MONOCYTES # BLD AUTO: 0.74 10*3/MM3 (ref 0.1–0.9)
MONOCYTES NFR BLD AUTO: 9.1 % (ref 5–12)
NEUTROPHILS NFR BLD AUTO: 5.62 10*3/MM3 (ref 1.7–7)
NEUTROPHILS NFR BLD AUTO: 68.9 % (ref 42.7–76)
NRBC BLD AUTO-RTO: 0 /100 WBC (ref 0–0.2)
PLATELET # BLD AUTO: 155 10*3/MM3 (ref 140–450)
PMV BLD AUTO: 13.4 FL (ref 6–12)
POTASSIUM SERPL-SCNC: 4.4 MMOL/L (ref 3.5–5.2)
RBC # BLD AUTO: 4.02 10*6/MM3 (ref 4.14–5.8)
SODIUM SERPL-SCNC: 139 MMOL/L (ref 136–145)
WBC NRBC COR # BLD: 8.15 10*3/MM3 (ref 3.4–10.8)

## 2023-01-20 PROCEDURE — 80048 BASIC METABOLIC PNL TOTAL CA: CPT | Performed by: FAMILY MEDICINE

## 2023-01-20 PROCEDURE — 99232 SBSQ HOSP IP/OBS MODERATE 35: CPT | Performed by: FAMILY MEDICINE

## 2023-01-20 PROCEDURE — 63710000001 INSULIN LISPRO (HUMAN) PER 5 UNITS: Performed by: PHYSICIAN ASSISTANT

## 2023-01-20 PROCEDURE — 74018 RADEX ABDOMEN 1 VIEW: CPT

## 2023-01-20 PROCEDURE — 99231 SBSQ HOSP IP/OBS SF/LOW 25: CPT | Performed by: THORACIC SURGERY (CARDIOTHORACIC VASCULAR SURGERY)

## 2023-01-20 PROCEDURE — 85025 COMPLETE CBC W/AUTO DIFF WBC: CPT | Performed by: FAMILY MEDICINE

## 2023-01-20 PROCEDURE — 25010000002 HEPARIN (PORCINE) PER 1000 UNITS: Performed by: PHYSICIAN ASSISTANT

## 2023-01-20 PROCEDURE — 97110 THERAPEUTIC EXERCISES: CPT

## 2023-01-20 PROCEDURE — 82962 GLUCOSE BLOOD TEST: CPT

## 2023-01-20 PROCEDURE — 97116 GAIT TRAINING THERAPY: CPT

## 2023-01-20 PROCEDURE — 25010000002 ONDANSETRON PER 1 MG: Performed by: PHYSICIAN ASSISTANT

## 2023-01-20 RX ORDER — SODIUM PHOSPHATE,MONO-DIBASIC 19G-7G/118
1 ENEMA (ML) RECTAL ONCE AS NEEDED
Status: DISCONTINUED | OUTPATIENT
Start: 2023-01-20 | End: 2023-01-23 | Stop reason: HOSPADM

## 2023-01-20 RX ORDER — ESCITALOPRAM OXALATE 5 MG/1
5 TABLET ORAL EVERY OTHER DAY
Qty: 90 TABLET | Refills: 1 | Status: SHIPPED | OUTPATIENT
Start: 2023-01-20 | End: 2023-03-22

## 2023-01-20 RX ORDER — LINEZOLID 600 MG/1
600 TABLET, FILM COATED ORAL EVERY 12 HOURS SCHEDULED
Qty: 14 TABLET | Refills: 0 | Status: SHIPPED | OUTPATIENT
Start: 2023-01-20 | End: 2023-01-27

## 2023-01-20 RX ORDER — LACTULOSE 10 G/15ML
300 SOLUTION ORAL ONCE
Status: COMPLETED | OUTPATIENT
Start: 2023-01-20 | End: 2023-01-20

## 2023-01-20 RX ORDER — LINEZOLID 600 MG/1
600 TABLET, FILM COATED ORAL EVERY 12 HOURS SCHEDULED
Status: DISCONTINUED | OUTPATIENT
Start: 2023-01-20 | End: 2023-01-21

## 2023-01-20 RX ADMIN — METRONIDAZOLE 500 MG: 500 TABLET ORAL at 06:28

## 2023-01-20 RX ADMIN — HEPARIN SODIUM 5000 UNITS: 5000 INJECTION INTRAVENOUS; SUBCUTANEOUS at 21:09

## 2023-01-20 RX ADMIN — ESCITALOPRAM OXALATE 5 MG: 10 TABLET ORAL at 11:59

## 2023-01-20 RX ADMIN — Medication 10 MG: at 09:55

## 2023-01-20 RX ADMIN — NYSTATIN: 100000 POWDER TOPICAL at 21:10

## 2023-01-20 RX ADMIN — SENNOSIDES AND DOCUSATE SODIUM 2 TABLET: 50; 8.6 TABLET ORAL at 21:09

## 2023-01-20 RX ADMIN — LEVOTHYROXINE SODIUM 88 MCG: 88 TABLET ORAL at 06:28

## 2023-01-20 RX ADMIN — TAMSULOSIN HYDROCHLORIDE 0.4 MG: 0.4 CAPSULE ORAL at 11:59

## 2023-01-20 RX ADMIN — SENNOSIDES AND DOCUSATE SODIUM 2 TABLET: 50; 8.6 TABLET ORAL at 11:59

## 2023-01-20 RX ADMIN — NYSTATIN: 100000 POWDER TOPICAL at 06:29

## 2023-01-20 RX ADMIN — Medication 1 CAPSULE: at 11:59

## 2023-01-20 RX ADMIN — LACTULOSE 300 ML: 10 SOLUTION ORAL at 16:22

## 2023-01-20 RX ADMIN — Medication 10 ML: at 21:10

## 2023-01-20 RX ADMIN — ONDANSETRON 4 MG: 2 INJECTION INTRAMUSCULAR; INTRAVENOUS at 18:20

## 2023-01-20 RX ADMIN — ATORVASTATIN CALCIUM 20 MG: 20 TABLET, FILM COATED ORAL at 21:09

## 2023-01-20 RX ADMIN — BUMETANIDE 1 MG: 1 TABLET ORAL at 12:02

## 2023-01-20 RX ADMIN — Medication 10 ML: at 12:04

## 2023-01-20 RX ADMIN — INSULIN LISPRO 4 UNITS: 100 INJECTION, SOLUTION INTRAVENOUS; SUBCUTANEOUS at 21:09

## 2023-01-20 RX ADMIN — LINEZOLID 600 MG: 600 TABLET, FILM COATED ORAL at 21:09

## 2023-01-20 RX ADMIN — FAMOTIDINE 20 MG: 20 TABLET, FILM COATED ORAL at 11:59

## 2023-01-20 RX ADMIN — SACUBITRIL AND VALSARTAN 1 TABLET: 24; 26 TABLET, FILM COATED ORAL at 21:09

## 2023-01-20 RX ADMIN — BUMETANIDE 1 MG: 1 TABLET ORAL at 21:09

## 2023-01-21 LAB
BACTERIA UR QL AUTO: ABNORMAL /HPF
BILIRUB UR QL STRIP: NEGATIVE
CLARITY UR: ABNORMAL
COLOR UR: YELLOW
GLUCOSE BLDC GLUCOMTR-MCNC: 157 MG/DL (ref 70–130)
GLUCOSE BLDC GLUCOMTR-MCNC: 161 MG/DL (ref 70–130)
GLUCOSE BLDC GLUCOMTR-MCNC: 174 MG/DL (ref 70–130)
GLUCOSE BLDC GLUCOMTR-MCNC: 197 MG/DL (ref 70–130)
GLUCOSE UR STRIP-MCNC: NEGATIVE MG/DL
HGB UR QL STRIP.AUTO: ABNORMAL
HYALINE CASTS UR QL AUTO: ABNORMAL /LPF
KETONES UR QL STRIP: ABNORMAL
LEUKOCYTE ESTERASE UR QL STRIP.AUTO: ABNORMAL
NITRITE UR QL STRIP: NEGATIVE
PH UR STRIP.AUTO: 5.5 [PH] (ref 5–8)
PROT UR QL STRIP: ABNORMAL
RBC # UR STRIP: ABNORMAL /HPF
REF LAB TEST METHOD: ABNORMAL
SP GR UR STRIP: 1.02 (ref 1–1.03)
SQUAMOUS #/AREA URNS HPF: ABNORMAL /HPF
UROBILINOGEN UR QL STRIP: ABNORMAL
WBC # UR STRIP: ABNORMAL /HPF
YEAST URNS QL MICRO: ABNORMAL /HPF

## 2023-01-21 PROCEDURE — 99231 SBSQ HOSP IP/OBS SF/LOW 25: CPT | Performed by: PHYSICIAN ASSISTANT

## 2023-01-21 PROCEDURE — 63710000001 INSULIN LISPRO (HUMAN) PER 5 UNITS: Performed by: PHYSICIAN ASSISTANT

## 2023-01-21 PROCEDURE — 25010000002 LINEZOLID 600 MG/300ML SOLUTION: Performed by: FAMILY MEDICINE

## 2023-01-21 PROCEDURE — 25010000002 HEPARIN (PORCINE) PER 1000 UNITS: Performed by: PHYSICIAN ASSISTANT

## 2023-01-21 PROCEDURE — 63710000001 INSULIN DETEMIR PER 5 UNITS: Performed by: INTERNAL MEDICINE

## 2023-01-21 PROCEDURE — 87086 URINE CULTURE/COLONY COUNT: CPT | Performed by: FAMILY MEDICINE

## 2023-01-21 PROCEDURE — 82962 GLUCOSE BLOOD TEST: CPT

## 2023-01-21 PROCEDURE — 63710000001 PROCHLORPERAZINE MALEATE PER 5 MG: Performed by: FAMILY MEDICINE

## 2023-01-21 PROCEDURE — 99232 SBSQ HOSP IP/OBS MODERATE 35: CPT | Performed by: FAMILY MEDICINE

## 2023-01-21 PROCEDURE — 63710000001 ONDANSETRON PER 8 MG: Performed by: PHYSICIAN ASSISTANT

## 2023-01-21 PROCEDURE — 81001 URINALYSIS AUTO W/SCOPE: CPT | Performed by: FAMILY MEDICINE

## 2023-01-21 RX ORDER — LINEZOLID 2 MG/ML
600 INJECTION, SOLUTION INTRAVENOUS EVERY 12 HOURS
Status: DISCONTINUED | OUTPATIENT
Start: 2023-01-21 | End: 2023-01-23 | Stop reason: ALTCHOICE

## 2023-01-21 RX ORDER — PROCHLORPERAZINE MALEATE 5 MG/1
5 TABLET ORAL EVERY 6 HOURS PRN
Status: DISCONTINUED | OUTPATIENT
Start: 2023-01-21 | End: 2023-01-23 | Stop reason: HOSPADM

## 2023-01-21 RX ADMIN — INSULIN LISPRO 2 UNITS: 100 INJECTION, SOLUTION INTRAVENOUS; SUBCUTANEOUS at 08:26

## 2023-01-21 RX ADMIN — INSULIN DETEMIR 10 UNITS: 100 INJECTION, SOLUTION SUBCUTANEOUS at 08:26

## 2023-01-21 RX ADMIN — LINEZOLID 600 MG: 600 INJECTION, SOLUTION INTRAVENOUS at 16:55

## 2023-01-21 RX ADMIN — NYSTATIN: 100000 POWDER TOPICAL at 05:35

## 2023-01-21 RX ADMIN — NYSTATIN: 100000 POWDER TOPICAL at 15:29

## 2023-01-21 RX ADMIN — PROCHLORPERAZINE MALEATE 5 MG: 5 TABLET ORAL at 11:50

## 2023-01-21 RX ADMIN — INSULIN LISPRO 2 UNITS: 100 INJECTION, SOLUTION INTRAVENOUS; SUBCUTANEOUS at 12:58

## 2023-01-21 RX ADMIN — Medication 10 ML: at 08:14

## 2023-01-21 RX ADMIN — HEPARIN SODIUM 5000 UNITS: 5000 INJECTION INTRAVENOUS; SUBCUTANEOUS at 15:28

## 2023-01-21 RX ADMIN — INSULIN LISPRO 2 UNITS: 100 INJECTION, SOLUTION INTRAVENOUS; SUBCUTANEOUS at 17:00

## 2023-01-21 RX ADMIN — ONDANSETRON HYDROCHLORIDE 4 MG: 4 TABLET, FILM COATED ORAL at 08:14

## 2023-01-21 RX ADMIN — SACUBITRIL AND VALSARTAN 1 TABLET: 24; 26 TABLET, FILM COATED ORAL at 21:36

## 2023-01-21 RX ADMIN — LEVOTHYROXINE SODIUM 88 MCG: 88 TABLET ORAL at 05:35

## 2023-01-21 RX ADMIN — PROCHLORPERAZINE MALEATE 5 MG: 5 TABLET ORAL at 18:17

## 2023-01-21 RX ADMIN — HEPARIN SODIUM 5000 UNITS: 5000 INJECTION INTRAVENOUS; SUBCUTANEOUS at 05:35

## 2023-01-21 RX ADMIN — Medication 10 ML: at 21:38

## 2023-01-22 LAB
BACTERIA SPEC AEROBE CULT: ABNORMAL
BACTERIA SPEC ANAEROBE CULT: NORMAL
GLUCOSE BLDC GLUCOMTR-MCNC: 150 MG/DL (ref 70–130)
GLUCOSE BLDC GLUCOMTR-MCNC: 197 MG/DL (ref 70–130)
GLUCOSE BLDC GLUCOMTR-MCNC: 239 MG/DL (ref 70–130)
GLUCOSE BLDC GLUCOMTR-MCNC: 261 MG/DL (ref 70–130)
GLUCOSE BLDC GLUCOMTR-MCNC: 288 MG/DL (ref 70–130)

## 2023-01-22 PROCEDURE — 99231 SBSQ HOSP IP/OBS SF/LOW 25: CPT | Performed by: PHYSICIAN ASSISTANT

## 2023-01-22 PROCEDURE — 25010000002 LINEZOLID 600 MG/300ML SOLUTION: Performed by: FAMILY MEDICINE

## 2023-01-22 PROCEDURE — 63710000001 INSULIN LISPRO (HUMAN) PER 5 UNITS: Performed by: PHYSICIAN ASSISTANT

## 2023-01-22 PROCEDURE — 99232 SBSQ HOSP IP/OBS MODERATE 35: CPT | Performed by: FAMILY MEDICINE

## 2023-01-22 PROCEDURE — 82962 GLUCOSE BLOOD TEST: CPT

## 2023-01-22 RX ORDER — BUMETANIDE 1 MG/1
1 TABLET ORAL DAILY
Status: DISCONTINUED | OUTPATIENT
Start: 2023-01-22 | End: 2023-01-23 | Stop reason: HOSPADM

## 2023-01-22 RX ADMIN — LINEZOLID 600 MG: 600 INJECTION, SOLUTION INTRAVENOUS at 05:35

## 2023-01-22 RX ADMIN — INSULIN LISPRO 2 UNITS: 100 INJECTION, SOLUTION INTRAVENOUS; SUBCUTANEOUS at 21:03

## 2023-01-22 RX ADMIN — APIXABAN 5 MG: 5 TABLET, FILM COATED ORAL at 21:03

## 2023-01-22 RX ADMIN — APIXABAN 5 MG: 5 TABLET, FILM COATED ORAL at 14:13

## 2023-01-22 RX ADMIN — INSULIN LISPRO 6 UNITS: 100 INJECTION, SOLUTION INTRAVENOUS; SUBCUTANEOUS at 16:53

## 2023-01-22 RX ADMIN — NYSTATIN: 100000 POWDER TOPICAL at 21:03

## 2023-01-22 RX ADMIN — LEVOTHYROXINE SODIUM 88 MCG: 88 TABLET ORAL at 05:35

## 2023-01-22 RX ADMIN — Medication 10 ML: at 21:04

## 2023-01-22 RX ADMIN — TAMSULOSIN HYDROCHLORIDE 0.4 MG: 0.4 CAPSULE ORAL at 09:19

## 2023-01-22 RX ADMIN — BUMETANIDE 1 MG: 1 TABLET ORAL at 11:32

## 2023-01-22 RX ADMIN — SENNOSIDES AND DOCUSATE SODIUM 2 TABLET: 50; 8.6 TABLET ORAL at 21:02

## 2023-01-22 RX ADMIN — BUMETANIDE 1 MG: 1 TABLET ORAL at 09:20

## 2023-01-22 RX ADMIN — NYSTATIN: 100000 POWDER TOPICAL at 14:12

## 2023-01-22 RX ADMIN — ATORVASTATIN CALCIUM 20 MG: 20 TABLET, FILM COATED ORAL at 21:02

## 2023-01-22 RX ADMIN — LINEZOLID 600 MG: 600 INJECTION, SOLUTION INTRAVENOUS at 16:49

## 2023-01-23 ENCOUNTER — READMISSION MANAGEMENT (OUTPATIENT)
Dept: CALL CENTER | Facility: HOSPITAL | Age: 78
End: 2023-01-23
Payer: MEDICARE

## 2023-01-23 VITALS
DIASTOLIC BLOOD PRESSURE: 60 MMHG | TEMPERATURE: 97.8 F | SYSTOLIC BLOOD PRESSURE: 104 MMHG | RESPIRATION RATE: 17 BRPM | HEART RATE: 62 BPM | HEIGHT: 75 IN | OXYGEN SATURATION: 97 % | BODY MASS INDEX: 25.03 KG/M2 | WEIGHT: 201.3 LBS

## 2023-01-23 PROBLEM — M86.9 TOE OSTEOMYELITIS: Status: RESOLVED | Noted: 2023-01-17 | Resolved: 2023-01-23

## 2023-01-23 PROBLEM — M86.272 SUBACUTE OSTEOMYELITIS OF LEFT FOOT: Status: RESOLVED | Noted: 2022-07-27 | Resolved: 2023-01-23

## 2023-01-23 LAB
GLUCOSE BLDC GLUCOMTR-MCNC: 174 MG/DL (ref 70–130)
GLUCOSE BLDC GLUCOMTR-MCNC: 250 MG/DL (ref 70–130)

## 2023-01-23 PROCEDURE — 97116 GAIT TRAINING THERAPY: CPT

## 2023-01-23 PROCEDURE — 63710000001 INSULIN DETEMIR PER 5 UNITS: Performed by: INTERNAL MEDICINE

## 2023-01-23 PROCEDURE — 99238 HOSP IP/OBS DSCHRG MGMT 30/<: CPT | Performed by: INTERNAL MEDICINE

## 2023-01-23 PROCEDURE — 97530 THERAPEUTIC ACTIVITIES: CPT

## 2023-01-23 PROCEDURE — 63710000001 INSULIN LISPRO (HUMAN) PER 5 UNITS: Performed by: PHYSICIAN ASSISTANT

## 2023-01-23 PROCEDURE — 82962 GLUCOSE BLOOD TEST: CPT

## 2023-01-23 PROCEDURE — 25010000002 LINEZOLID 600 MG/300ML SOLUTION: Performed by: FAMILY MEDICINE

## 2023-01-23 PROCEDURE — 99231 SBSQ HOSP IP/OBS SF/LOW 25: CPT | Performed by: PHYSICIAN ASSISTANT

## 2023-01-23 RX ORDER — LINEZOLID 600 MG/1
600 TABLET, FILM COATED ORAL EVERY 12 HOURS SCHEDULED
Status: DISCONTINUED | OUTPATIENT
Start: 2023-01-23 | End: 2023-01-23 | Stop reason: HOSPADM

## 2023-01-23 RX ADMIN — FAMOTIDINE 20 MG: 20 TABLET, FILM COATED ORAL at 09:43

## 2023-01-23 RX ADMIN — Medication 5000 UNITS: at 09:42

## 2023-01-23 RX ADMIN — TAMSULOSIN HYDROCHLORIDE 0.4 MG: 0.4 CAPSULE ORAL at 09:43

## 2023-01-23 RX ADMIN — INSULIN DETEMIR 10 UNITS: 100 INJECTION, SOLUTION SUBCUTANEOUS at 09:40

## 2023-01-23 RX ADMIN — APIXABAN 5 MG: 5 TABLET, FILM COATED ORAL at 09:44

## 2023-01-23 RX ADMIN — LINEZOLID 600 MG: 600 INJECTION, SOLUTION INTRAVENOUS at 05:33

## 2023-01-23 RX ADMIN — SACUBITRIL AND VALSARTAN 1 TABLET: 24; 26 TABLET, FILM COATED ORAL at 09:44

## 2023-01-23 RX ADMIN — NYSTATIN: 100000 POWDER TOPICAL at 12:37

## 2023-01-23 RX ADMIN — BUMETANIDE 1 MG: 1 TABLET ORAL at 09:44

## 2023-01-23 RX ADMIN — LEVOTHYROXINE SODIUM 88 MCG: 88 TABLET ORAL at 09:43

## 2023-01-23 RX ADMIN — Medication 1 CAPSULE: at 09:43

## 2023-01-23 RX ADMIN — ASPIRIN 81 MG CHEWABLE TABLET 81 MG: 81 TABLET CHEWABLE at 09:44

## 2023-01-23 RX ADMIN — CARVEDILOL 3.12 MG: 3.12 TABLET, FILM COATED ORAL at 09:43

## 2023-01-23 RX ADMIN — INSULIN LISPRO 2 UNITS: 100 INJECTION, SOLUTION INTRAVENOUS; SUBCUTANEOUS at 09:41

## 2023-01-23 RX ADMIN — INSULIN LISPRO 6 UNITS: 100 INJECTION, SOLUTION INTRAVENOUS; SUBCUTANEOUS at 12:36

## 2023-01-23 NOTE — OUTREACH NOTE
Prep Survey    Flowsheet Row Responses   Gnosticism facility patient discharged from? [facility]   Is LACE score < 7 ? No   Eligibility [facility]   Date of Admission 01/16/23   Date of Discharge 01/23/23   Discharge Disposition Home or Self Care   Discharge diagnosis Subacute osteomyelitis of left foot-amputation left digit   Does the patient have one of the following disease processes/diagnoses(primary or secondary)? General Surgery   Does the patient have Home health ordered? Yes   What is the Home health agency?  [facility]    Is there a DME ordered? No   Prep survey completed? Yes          BRENDON HUBBARD - Registered Nurse

## 2023-01-24 ENCOUNTER — TELEPHONE (OUTPATIENT)
Dept: FAMILY MEDICINE CLINIC | Facility: CLINIC | Age: 78
End: 2023-01-24
Payer: MEDICARE

## 2023-01-24 ENCOUNTER — TRANSITIONAL CARE MANAGEMENT TELEPHONE ENCOUNTER (OUTPATIENT)
Dept: CALL CENTER | Facility: HOSPITAL | Age: 78
End: 2023-01-24
Payer: MEDICARE

## 2023-01-24 NOTE — OUTREACH NOTE
Call Center TCM Note    Flowsheet Row Responses   [facility] patient discharged from? [facility]   Does the patient have one of the following disease processes/diagnoses(primary or secondary)? General Surgery   TCM attempt successful? Yes  [None listed on VR]   Call start time 1230   Call end time 1236   Discharge diagnosis Subacute osteomyelitis of left foot-amputation left digit   Meds reviewed with patient/caregiver? Yes   Is the patient having any side effects they believe may be caused by any medication additions or changes? No  [pt states that he doesn't know what reaction he will have as he has only taken one dose since DC.]   Does the patient have all medications related to this admission filled (includes all antibiotics, pain medications, etc.) Yes   Is the patient taking all medications as directed (includes completed medication regime)? Yes   Comments  1/25/2023  2:45 PM  HOSPITAL FOLLOW UP pt states that he is aware   Does the patient have an appointment with their PCP within 7 days of discharge? Yes   What is the Home health agency?  [facility]    Has home health visited the patient within 72 hours of discharge? Call prior to 72 hours   Home health comments Pt states he has not heard from . Suggested if not heard from HH by end of day to call tomorrow. Number for HH on his AVS, pt states he is eating now and will not get the papers to look.    Psychosocial issues? No   Comments Pt reports doing well. No other information offered.   Did the patient receive a copy of their discharge instructions? Yes   What is the patient's perception of their health status since discharge? Improving   Nursing interventions Nurse provided patient education   TCM call completed? Yes   Wrap up additional comments quick call,    Call end time 1236   Is the patient interested in additional calls from an ambulatory ?  NOTE:  applies to high risk patients requiring additional follow-up. No          Jenny KRISHNAN  FRAN Desouza    1/24/2023, 12:37 EST

## 2023-01-25 ENCOUNTER — LAB (OUTPATIENT)
Dept: LAB | Facility: HOSPITAL | Age: 78
End: 2023-01-25
Payer: MEDICARE

## 2023-01-25 ENCOUNTER — OFFICE VISIT (OUTPATIENT)
Dept: FAMILY MEDICINE CLINIC | Facility: CLINIC | Age: 78
End: 2023-01-25
Payer: MEDICARE

## 2023-01-25 VITALS
DIASTOLIC BLOOD PRESSURE: 76 MMHG | HEART RATE: 75 BPM | BODY MASS INDEX: 24.99 KG/M2 | HEIGHT: 75 IN | SYSTOLIC BLOOD PRESSURE: 120 MMHG | OXYGEN SATURATION: 97 % | WEIGHT: 201 LBS

## 2023-01-25 DIAGNOSIS — E11.59 TYPE 2 DIABETES MELLITUS WITH OTHER CIRCULATORY COMPLICATION, WITH LONG-TERM CURRENT USE OF INSULIN: Chronic | ICD-10-CM

## 2023-01-25 DIAGNOSIS — I95.1 ORTHOSTATIC HYPOTENSION: ICD-10-CM

## 2023-01-25 DIAGNOSIS — E78.2 MIXED HYPERLIPIDEMIA: Chronic | ICD-10-CM

## 2023-01-25 DIAGNOSIS — Z95.1 S/P CABG X 3: Chronic | ICD-10-CM

## 2023-01-25 DIAGNOSIS — M86.9 OSTEOMYELITIS, UNSPECIFIED SITE, UNSPECIFIED TYPE: Primary | ICD-10-CM

## 2023-01-25 DIAGNOSIS — Z79.4 TYPE 2 DIABETES MELLITUS WITH OTHER CIRCULATORY COMPLICATION, WITH LONG-TERM CURRENT USE OF INSULIN: Chronic | ICD-10-CM

## 2023-01-25 DIAGNOSIS — I10 PRIMARY HYPERTENSION: Chronic | ICD-10-CM

## 2023-01-25 LAB
ALBUMIN SERPL-MCNC: 3.4 G/DL (ref 3.5–5.2)
ALBUMIN/GLOB SERPL: 1.4 G/DL
ALP SERPL-CCNC: 70 U/L (ref 39–117)
ALT SERPL W P-5'-P-CCNC: 19 U/L (ref 1–41)
ANION GAP SERPL CALCULATED.3IONS-SCNC: 8.5 MMOL/L (ref 5–15)
AST SERPL-CCNC: 25 U/L (ref 1–40)
BILIRUB SERPL-MCNC: 0.2 MG/DL (ref 0–1.2)
BUN SERPL-MCNC: 50 MG/DL (ref 8–23)
BUN/CREAT SERPL: 24.4 (ref 7–25)
CALCIUM SPEC-SCNC: 8.5 MG/DL (ref 8.6–10.5)
CHLORIDE SERPL-SCNC: 104 MMOL/L (ref 98–107)
CO2 SERPL-SCNC: 25.5 MMOL/L (ref 22–29)
CREAT SERPL-MCNC: 2.05 MG/DL (ref 0.76–1.27)
EGFRCR SERPLBLD CKD-EPI 2021: 32.8 ML/MIN/1.73
GLOBULIN UR ELPH-MCNC: 2.4 GM/DL
GLUCOSE SERPL-MCNC: 170 MG/DL (ref 65–99)
POTASSIUM SERPL-SCNC: 4.5 MMOL/L (ref 3.5–5.2)
PROT SERPL-MCNC: 5.8 G/DL (ref 6–8.5)
SODIUM SERPL-SCNC: 138 MMOL/L (ref 136–145)

## 2023-01-25 PROCEDURE — 99495 TRANSJ CARE MGMT MOD F2F 14D: CPT | Performed by: INTERNAL MEDICINE

## 2023-01-25 PROCEDURE — 85007 BL SMEAR W/DIFF WBC COUNT: CPT | Performed by: INTERNAL MEDICINE

## 2023-01-25 PROCEDURE — 80053 COMPREHEN METABOLIC PANEL: CPT | Performed by: INTERNAL MEDICINE

## 2023-01-25 PROCEDURE — 85025 COMPLETE CBC W/AUTO DIFF WBC: CPT | Performed by: INTERNAL MEDICINE

## 2023-01-25 PROCEDURE — 1111F DSCHRG MED/CURRENT MED MERGE: CPT | Performed by: INTERNAL MEDICINE

## 2023-01-25 NOTE — PROGRESS NOTES
Chief Complaint   Patient presents with   • Osteomyelitis      [facility] ED f/u admission 1/16/2023 - Discharge 1/23/2023    • Abdominal Pain     Sx started with new medication from the [facility]        HPI:  Jermaine Singh is a 77 y.o. male who presents today for hospital follow-up osteomyelitis.    ROS:  Constitutional: no fevers, night sweats or unexplained weight loss  Eyes: no vision changes  ENT: no runny nose, ear pain, sore throat  Cardio: no chest pain, palpitations  Pulm: no shortness of breath, wheezing, or cough  GI: no abdominal pain or changes in bowel movements  : no difficulty urinating  MSK: no difficulty ambulating, no joint pain  Neuro: no weakness, dizziness or headache  Psych: no trouble sleeping  Endo: no change in appetite      Past Medical History:   Diagnosis Date   • Allergic    • Arthritis    • Asthma    • Coronary artery disease    • Diabetes mellitus (HCC) 2000    started on inuslin 12/2018; started on po meds in 2000; checking blood sugars daily    • Disease of thyroid gland     po meds daily for hypothyroidism    • History of fracture as a child     rt leg- severe    • Hyperlipidemia    • Hypertension    • Hypothyroidism    • Peripheral neuropathy    • Peripheral vascular disease (HCC)     s/p angiogram 2/19-needs stent in left leg    • Proximal phalanx fracture of the second digit extending into the second metatarsal joint 7/28/2022   • RBBB    • Right knee pain    • Vitamin D deficiency       Family History   Problem Relation Age of Onset   • Coronary artery disease Mother    • Diabetes Mother    • Cancer Father       Social History     Socioeconomic History   • Marital status:    • Number of children: 2   Tobacco Use   • Smoking status: Never   • Smokeless tobacco: Never   Vaping Use   • Vaping Use: Never used   Substance and Sexual Activity   • Alcohol use: Not Currently     Comment: every 2-3 months   • Drug use: No   • Sexual activity: Defer      Allergies    Allergen Reactions   • Vancomycin Other (See Comments)     Kidney failure per last admission      Immunization History   Administered Date(s) Administered   • COVID-19 (PFIZER) PURPLE CAP 01/16/2021   • FLUAD TRI 65YR+ 10/12/2018   • Hepatitis A 11/12/2018, 06/27/2019   • Pneumococcal Conjugate 13-Valent (PCV13) 10/12/2018   • Shingrix 06/27/2019, 10/22/2019        PE:  Vitals:    01/25/23 1437   BP: 120/76   Pulse: 75   SpO2: 97%      Body mass index is 25.12 kg/m².    Gen Appearance: NAD  HEENT: Normocephalic, PERRLA, no thyromegaly, trache midline  Heart: RRR, normal S1 and S2, no murmur  Lungs: CTA b/l, no wheezing, no crackles  Abdomen: Soft, non-tender, non-distended, no guarding and BSx4  MSK: Moves all extremities well, normal gait, no peripheral edema  Pulses: Palpable and equal b/l  Lymph nodes: No palpable lymphadenopathy   Neuro: No focal deficits      Current Outpatient Medications   Medication Sig Dispense Refill   • acetaminophen (TYLENOL) 325 MG tablet Take 2 tablets by mouth Every 6 (Six) Hours As Needed for Mild Pain.     • apixaban (ELIQUIS) 5 MG tablet tablet Take 1 tablet by mouth Every 12 (Twelve) Hours. Indications: DVT/PE (active thrombosis) 180 tablet 0   • aspirin 81 MG chewable tablet Chew 1 tablet Daily. 90 tablet 3   • atorvastatin (LIPITOR) 20 MG tablet Take 1 tablet by mouth Every Night. 90 tablet 3   • bumetanide (BUMEX) 1 MG tablet Take 1 tablet by mouth 2 (Two) Times a Day. 60 tablet 6   • carvedilol (COREG) 3.125 MG tablet Take 1 tablet by mouth 2 (Two) Times a Day With Meals.     • Cholecalciferol (VITAMIN D3) 5000 units tablet tablet Take 5,000 Units by mouth Daily.     • Diclofenac Sodium (VOLTAREN) 1 % gel gel Apply 2 g topically to the appropriate area as directed 4 (Four) Times a Day As Needed (pain).     • doxycycline (VIBRAMYICN) 100 MG tablet Take 1 tablet by mouth 2 (Two) Times a Day.     • escitalopram (Lexapro) 5 MG tablet Take 1 tablet by mouth Every Other Day. 90  tablet 1   • famotidine (PEPCID) 20 MG tablet Take 1 tablet by mouth Daily. 90 tablet 3   • insulin detemir (LEVEMIR) 100 UNIT/ML injection Inject 8 Units under the skin into the appropriate area as directed Every Night. (Patient taking differently: Inject 8 Units under the skin into the appropriate area as directed Daily.)  12   • Insulin Lispro (humaLOG) 100 UNIT/ML injection Inject 3 Units under the skin into the appropriate area as directed 3 (Three) Times a Day Before Meals. Under 150 3 unit  150-200 4 units  200-250- 5 unit     • levothyroxine (SYNTHROID, LEVOTHROID) 88 MCG tablet Take 1 tablet by mouth Every Morning. 90 tablet 3   • linezolid (ZYVOX) 600 MG tablet Take 1 tablet by mouth Every 12 (Twelve) Hours for 14 doses. Indications: Infection of the Skin and/or Soft Tissue 14 tablet 0   • magnesium oxide (MAG-OX) 400 MG tablet Take 1 tablet by mouth Daily. 90 tablet 3   • Misc. Devices (Rollator Ultra-Light) misc 1 each Daily. 1 each 0   • nitroglycerin (NITROSTAT) 0.4 MG SL tablet Place 1 tablet under the tongue Every 5 (Five) Minutes As Needed for Chest Pain. Take no more than 3 doses in 15 minutes. 9 tablet 12   • nystatin (MYCOSTATIN) 614603 UNIT/GM powder APPLY  POWDER TOPICALLY TO AFFECTED AREA THREE TIMES DAILY 60 g 0   • Probiotic Product (PROBIOTIC DAILY PO) Take  by mouth Daily.     • sacubitril-valsartan (Entresto) 24-26 MG tablet Take 1 tablet by mouth 2 (Two) Times a Day. 60 tablet 0   • tamsulosin (FLOMAX) 0.4 MG capsule 24 hr capsule Take 1 capsule by mouth Daily. 30 capsule 0     No current facility-administered medications for this visit.     Facility-Administered Medications Ordered in Other Visits   Medication Dose Route Frequency Provider Last Rate Last Admin   • Chlorhexidine Gluconate Cloth 2 % pads 1 application  1 application Topical Q12H PRN Mohan Arauz PA          Recommend checking lab work today.  Continue linezolid as prescribed.  Follow-up with ID and vascular as  scheduled.    Current outpatient and discharge medications have been reconciled for the patient.  Reviewed by: Mj Kennedy DO    Diagnoses and all orders for this visit:    1. Osteomyelitis, unspecified site, unspecified type (HCC) (Primary)  -     CBC & Differential; Future  -     Comprehensive Metabolic Panel; Future  -     CBC & Differential  -     Comprehensive Metabolic Panel    2. Orthostatic hypotension  Reduce Bumex to once daily, follow-up with cardiology for further evaluation.  3. 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 Au    4. Primary hypertension    5. Mixed hyperlipidemia    6. Type 2 diabetes mellitus with other circulatory complication, with long-term current use of insulin (HCC)         No follow-ups on file.     Dictated Utilizing Dragon Dictation    Please note that portions of this note were completed with a voice recognition program.    Part of this note may be an electronic transcription/translation of spoken language to printed text using the Dragon Dictation System.

## 2023-01-26 LAB
BASOPHILS # BLD MANUAL: 0.08 10*3/MM3 (ref 0–0.2)
BASOPHILS NFR BLD MANUAL: 1.1 % (ref 0–1.5)
DEPRECATED RDW RBC AUTO: 46.9 FL (ref 37–54)
EOSINOPHIL # BLD MANUAL: 0.25 10*3/MM3 (ref 0–0.4)
EOSINOPHIL NFR BLD MANUAL: 3.3 % (ref 0.3–6.2)
ERYTHROCYTE [DISTWIDTH] IN BLOOD BY AUTOMATED COUNT: 14.7 % (ref 12.3–15.4)
HCT VFR BLD AUTO: 36.6 % (ref 37.5–51)
HGB BLD-MCNC: 11.8 G/DL (ref 13–17.7)
LYMPHOCYTES # BLD MANUAL: 0.83 10*3/MM3 (ref 0.7–3.1)
LYMPHOCYTES NFR BLD MANUAL: 6.7 % (ref 5–12)
MCH RBC QN AUTO: 28.2 PG (ref 26.6–33)
MCHC RBC AUTO-ENTMCNC: 32.2 G/DL (ref 31.5–35.7)
MCV RBC AUTO: 87.6 FL (ref 79–97)
MONOCYTES # BLD: 0.5 10*3/MM3 (ref 0.1–0.9)
NEUTROPHILS # BLD AUTO: 5.84 10*3/MM3 (ref 1.7–7)
NEUTROPHILS NFR BLD MANUAL: 77.8 % (ref 42.7–76)
PLAT MORPH BLD: NORMAL
PLATELET # BLD AUTO: 174 10*3/MM3 (ref 140–450)
PMV BLD AUTO: 13 FL (ref 6–12)
RBC # BLD AUTO: 4.18 10*6/MM3 (ref 4.14–5.8)
RBC MORPH BLD: NORMAL
SMUDGE CELLS BLD QL SMEAR: ABNORMAL
VARIANT LYMPHS NFR BLD MANUAL: 11.1 % (ref 19.6–45.3)
WBC NRBC COR # BLD: 7.5 10*3/MM3 (ref 3.4–10.8)

## 2023-01-31 RX ORDER — TAMSULOSIN HYDROCHLORIDE 0.4 MG/1
0.4 CAPSULE ORAL DAILY
Qty: 30 CAPSULE | Refills: 0 | Status: SHIPPED | OUTPATIENT
Start: 2023-01-31 | End: 2023-04-03 | Stop reason: SDUPTHER

## 2023-01-31 NOTE — TELEPHONE ENCOUNTER
Caller: ALEJO DAWNE    Relationship: Emergency Contact    Best call back number:[phone]    Requested Prescriptions:   Requested Prescriptions     Pending Prescriptions Disp Refills   • tamsulosin (FLOMAX) 0.4 MG capsule 24 hr capsule 30 capsule 0     Sig: Take 1 capsule by mouth Daily.        Pharmacy where request should be sent: [facility] [address] [place] [address] [phone] [facility] [phone] FX     Additional details provided by patient:     Does the patient have less than a 3 day supply:  [] Yes  [x] No    Would you like a call back once the refill request has been completed: [] Yes [x] No    If the office needs to give you a call back, can they leave a voicemail: [] Yes [x] No    Ata Lozada Rep   01/31/23 12:51 EST

## 2023-02-07 DIAGNOSIS — B49 FUNGAL INFECTION: ICD-10-CM

## 2023-02-07 RX ORDER — NYSTATIN 100000 [USP'U]/G
POWDER TOPICAL
Qty: 60 G | Refills: 0 | Status: SHIPPED | OUTPATIENT
Start: 2023-02-07

## 2023-02-07 NOTE — TELEPHONE ENCOUNTER
Rx Refill Note  Requested Prescriptions     Pending Prescriptions Disp Refills   • nystatin (MYCOSTATIN) 029707 UNIT/GM powder [Pharmacy Med Name: Nystatin 277601 UNIT/GM External Powder] 60 g 0     Sig: APPLY  POWDER TOPICALLY TO AFFECTED AREA THREE TIMES DAILY      Last office visit with prescribing clinician: 1/25/2023   Last telemedicine visit with prescribing clinician: Visit date not found   Next office visit with prescribing clinician: Visit date not found                         Would you like a call back once the refill request has been completed: [] Yes [] No    If the office needs to give you a call back, can they leave a voicemail: [] Yes [] No    Nirali Camilo MA  02/07/23, 10:47 EST

## 2023-02-15 ENCOUNTER — OFFICE VISIT (OUTPATIENT)
Dept: CARDIAC SURGERY | Facility: CLINIC | Age: 78
End: 2023-02-15
Payer: MEDICARE

## 2023-02-15 VITALS
OXYGEN SATURATION: 99 % | BODY MASS INDEX: 24.75 KG/M2 | WEIGHT: 198 LBS | HEART RATE: 65 BPM | DIASTOLIC BLOOD PRESSURE: 70 MMHG | TEMPERATURE: 98 F | SYSTOLIC BLOOD PRESSURE: 130 MMHG

## 2023-02-15 DIAGNOSIS — I96 GANGRENE: Primary | ICD-10-CM

## 2023-02-15 DIAGNOSIS — I73.9 PERIPHERAL VASCULAR DISEASE: ICD-10-CM

## 2023-02-15 PROCEDURE — 99212 OFFICE O/P EST SF 10 MIN: CPT | Performed by: THORACIC SURGERY (CARDIOTHORACIC VASCULAR SURGERY)

## 2023-02-15 NOTE — PROGRESS NOTES
Patient Information  Jermaine Singh                                                                                          [address] [place] [address]      1945  [unfilled]  [unfilled]    Chief Complaint   Patient presents with   • Post-op Follow-up     Hosp D/C Left 2nd Toe Amputation 1/17/23-Osteo       History of Present Illness: Patient seen today for first postop visit following left second toe amputation primary closure performed on January 17, 2023.  Patient has diabetic neuropathy and severe unreconstructable peripheral vascular disease.    Vitals:    02/15/23 0853   BP: 130/70   BP Location: Right arm   Patient Position: Sitting   Pulse: 65   Temp: 98 °F (36.7 °C)   SpO2: 99%   Weight: 89.8 kg (198 lb)        Physical Exam incisions healing well sutures removed and Steri-Strips were applied.    Lab/other results:    Assessment: #1.  Postop left second toe amputation and primary closure January 17, 2023 healing well.  Sutures removed and Steri-Strips applied    Plan: Patient is to remain nonweightbearing in the interim.  We will see him back in 2 weeks for follow-up visit.    Gopal Márquez M.D.

## 2023-02-20 ENCOUNTER — TELEPHONE (OUTPATIENT)
Dept: UROLOGY | Facility: CLINIC | Age: 78
End: 2023-02-20
Payer: MEDICARE

## 2023-02-20 NOTE — TELEPHONE ENCOUNTER
I talked to patients wife and let her know that they could go to their PCP or urgent care and they could test the urine because there wasn't any home test. she stated that it isn't as cloudy as it was yesterday and he wasn't having any other symptoms so they were just going to wait and see if he kept getting better. I told her to get him to keep pushing fluids and see if that helps. She voiced understanding.

## 2023-02-20 NOTE — TELEPHONE ENCOUNTER
Caller: HIPOLITO DAWN    Relationship: WIFE     Best call back number: [phone]    PT'S SPOUSE HAD TO MOVE APPT DUE TO PT RECOVERING FROM SURGERY. PT'S SPOUSE SAID PT'S URINE IS CLOUDY AND WANTING TO KNOW IF THERE IS HOME TEST/KIT THEY CAN GET OR USE.

## 2023-02-22 ENCOUNTER — TELEPHONE (OUTPATIENT)
Dept: UROLOGY | Facility: CLINIC | Age: 78
End: 2023-02-22
Payer: MEDICARE

## 2023-02-22 ENCOUNTER — TELEPHONE (OUTPATIENT)
Dept: FAMILY MEDICINE CLINIC | Facility: CLINIC | Age: 78
End: 2023-02-22
Payer: MEDICARE

## 2023-02-22 NOTE — TELEPHONE ENCOUNTER
Diamond from [facility] called and stated that she collected a urine from Mr. Singh because he has cloudy urine and back pain, they will do a u/a and culture if needed.  Also his cardiologist is going increase his bumex to 2 daily. She just wanted you to be updated, she got the culture order from his PCP.

## 2023-02-22 NOTE — TELEPHONE ENCOUNTER
Home health called regarding status for patient.   They typically see patient for the amputations for the toes and surgical sites however, the patient has retained some swelling without any weight gain. Weight has been stable. Dr. King prescribed the patient bumex originally BID but recently changed it to once a day and the second can be as needed. Home Health is going to reach out to their office to see if this is still okay for them to do.     Requesting UA and culture orders as patient thinks he has a UTI.     Please advise on verbal order.

## 2023-02-28 LAB
FUNGUS WND CULT: NORMAL
MYCOBACTERIUM SPEC CULT: NORMAL
NIGHT BLUE STAIN TISS: NORMAL

## 2023-03-06 ENCOUNTER — TELEPHONE (OUTPATIENT)
Dept: FAMILY MEDICINE CLINIC | Facility: CLINIC | Age: 78
End: 2023-03-06
Payer: MEDICARE

## 2023-03-06 NOTE — TELEPHONE ENCOUNTER
Caller: HIPOLITO DAWN    Relationship: Emergency Contact    Best call back number: [phone]    What medication are you requesting: SOMETHING FOR A COLD THAT WONT INTERACT WITH THE PATIENTS OTHER MEDICATION     What are your current symptoms: SORE THRAOT    How long have you been experiencing symptoms: SINCE LAST NIGHT       If a prescription is needed, what is your preferred pharmacy and phone number: [facility] [address] [place] [address] [phone] [facility] [phone] FX     Additional note THE PATIENTS WIFE IS CONCERNED ABOUT WHAT SHE CAN GIVE THE PATIENT FOR HIS COLD

## 2023-03-06 NOTE — TELEPHONE ENCOUNTER
Contacted patient's wife to discuss further, she reports that she has been sick recently and her husabnd developed a sore throat yesterday     She is concerned that if he worsens with similar symptoms as hers; cough sore throat, drainage. I advised her that Mucinex is available and he can schedule an appt if he worsens.

## 2023-03-08 ENCOUNTER — OFFICE VISIT (OUTPATIENT)
Dept: CARDIAC SURGERY | Facility: CLINIC | Age: 78
End: 2023-03-08
Payer: MEDICARE

## 2023-03-08 VITALS
HEIGHT: 75 IN | DIASTOLIC BLOOD PRESSURE: 70 MMHG | BODY MASS INDEX: 24.37 KG/M2 | SYSTOLIC BLOOD PRESSURE: 118 MMHG | HEART RATE: 81 BPM | OXYGEN SATURATION: 94 % | WEIGHT: 196 LBS

## 2023-03-08 DIAGNOSIS — I96 GANGRENE: Primary | ICD-10-CM

## 2023-03-08 DIAGNOSIS — I25.10 CORONARY ARTERY DISEASE INVOLVING NATIVE CORONARY ARTERY OF NATIVE HEART WITHOUT ANGINA PECTORIS: ICD-10-CM

## 2023-03-08 DIAGNOSIS — I73.9 PERIPHERAL VASCULAR DISEASE: ICD-10-CM

## 2023-03-08 PROCEDURE — 99024 POSTOP FOLLOW-UP VISIT: CPT | Performed by: THORACIC SURGERY (CARDIOTHORACIC VASCULAR SURGERY)

## 2023-03-08 NOTE — PROGRESS NOTES
"Patient Information  Jermaine Singh                                                                                          [address] [place] [address]      1945  [unfilled]  [unfilled]    Chief Complaint   Patient presents with   • gangrene     2 week follow up - s/p left 2nd toe amp on 1/17/23       History of Present Illness: Patient seen today for postop visit of the left second toe amputation and primary closure site.  Amputation done on January 17, 2023 he has severe diabetic neuropathy as well as unreconstructable vessel vascular disease.    Vitals:    03/08/23 0853   BP: 118/70   BP Location: Right arm   Patient Position: Sitting   Pulse: 81   SpO2: 94%   Weight: 88.9 kg (196 lb)   Height: 190.5 cm (75\")        Physical Exam examination reveals a left great toe amputation site and left second toe amputation site to be healing well but still with some eschar formation.    Lab/other results:    Assessment: Postop left second toe amputation primary closure January 17, 2023 and status post left great toe amputation in 2022.    Plan: We will plan to formally see the patient back in 2 months for follow-up visit.    Gopal Márquez M.D.   "

## 2023-03-14 ENCOUNTER — OFFICE VISIT (OUTPATIENT)
Dept: UROLOGY | Facility: CLINIC | Age: 78
End: 2023-03-14
Payer: MEDICARE

## 2023-03-14 VITALS
WEIGHT: 196 LBS | SYSTOLIC BLOOD PRESSURE: 112 MMHG | BODY MASS INDEX: 24.37 KG/M2 | OXYGEN SATURATION: 97 % | DIASTOLIC BLOOD PRESSURE: 72 MMHG | HEIGHT: 75 IN | HEART RATE: 87 BPM

## 2023-03-14 DIAGNOSIS — R97.20 ELEVATED PROSTATE SPECIFIC ANTIGEN (PSA): Primary | ICD-10-CM

## 2023-03-14 PROCEDURE — 99214 OFFICE O/P EST MOD 30 MIN: CPT | Performed by: UROLOGY

## 2023-03-14 RX ORDER — LIFITEGRAST 50 MG/ML
1 SOLUTION/ DROPS OPHTHALMIC 2 TIMES DAILY
COMMUNITY
Start: 2023-03-13

## 2023-03-14 NOTE — PROGRESS NOTES
Elevated PSA Office Visit      Patient Name: Jermaine Singh  : 1945   MRN: [id number]     Chief Complaint:  Elevated PSA.    Chief Complaint   Patient presents with   • Elevated PSA       Referring Provider: No ref. provider found    History of Present Illness: Jermaine Singh is a 77 y.o. male who presents today with history of elevated PSA.   He had a PSA checked previously which was elevated.   He reports no urinary symptoms.  He recently had his second toe amputated.    He has not had a recent PSA.  He reports no dysuria or gross hematuria. He has minimal LUTS.      Lab Results   Component Value Date    PSA 14.400 (H) 2022    PSA 20.200 (H) 10/28/2022          Subjective      Review of System:   Review of Systems   Constitutional: Negative.    HENT: Negative.    Eyes: Negative.    Respiratory: Negative.    Cardiovascular: Negative.    Gastrointestinal: Negative.    Endocrine: Negative.    Genitourinary: Negative.    Musculoskeletal: Negative.    Skin: Negative.    Allergic/Immunologic: Negative.    Neurological: Negative.    Hematological: Negative.    Psychiatric/Behavioral: Negative.       I have reviewed the ROS documented by my clinical staff, I have updated appropriately and I agree. Rudy Dixon MD    Past Medical History:   Past Medical History:   Diagnosis Date   • Allergic    • Arthritis    • Asthma    • Coronary artery disease    • Diabetes mellitus (HCC)     started on inuslin 2018; started on po meds in ; checking blood sugars daily    • Disease of thyroid gland     po meds daily for hypothyroidism    • History of fracture as a child     rt leg- severe    • Hyperlipidemia    • Hypertension    • Hypothyroidism    • Peripheral neuropathy    • Peripheral vascular disease (HCC)     s/p angiogram -needs stent in left leg    • Proximal phalanx fracture of the second digit extending into the second metatarsal joint 2022   • RBBB    • Right knee pain    •  Vitamin D deficiency        Past Surgical History:   Past Surgical History:   Procedure Laterality Date   • AMPUTATION DIGIT Left 1/17/2023    Procedure: AMPUTATION DIGIT LEFT;  Surgeon: Gopal Márquez MD;  Location:  [facility];  Service: Vascular;  Laterality: Left;   • APPENDECTOMY     • CARDIAC CATHETERIZATION N/A 2/14/2019    Procedure: Left Heart Cath;  Surgeon: Cooper Apodaca MD;  Location: [facility]ATH INVASIVE LOCATION;  Service: Cardiology   • CARDIAC ELECTROPHYSIOLOGY PROCEDURE N/A 5/18/2022    Procedure: DEVICE IMPLANT;  Surgeon: Cooper Apodaca MD;  Location: [facility]ATH INVASIVE LOCATION;  Service: Cardiology;  Laterality: N/A;   • COLONOSCOPY     • CORONARY ARTERY BYPASS GRAFT N/A 3/22/2019    Procedure: MEDIAN STERNOTOMY, CORONARY ARTERY BYPASS GRAFT X3, UTILIZING THE LEFT INTERNAL MAMMARY ARTERY, EVH AND OPEN HARVEST OF THE RIGHT GREATER SAPHENOUS VEIN, EXPLORATION OF THE LEFT LEG;  Surgeon: Ap Au MD;  Location: [facility];  Service: Cardiothoracic   • EYE SURGERY Bilateral     cataracts    • INTERVENTIONAL RADIOLOGY PROCEDURE N/A 7/29/2021    Procedure: Abdominal Aortagram with Runoff;  Surgeon: Jermaine Hernandez MD;  Location: [facility]ATH INVASIVE LOCATION;  Service: Cardiovascular;  Laterality: N/A;   • KNEE ARTHROSCOPY      right x 2, left x 1   • LACERATION REPAIR      right leg   • LEG SURGERY      2 for fracture of rt leg    • TONSILLECTOMY      Adnoidectomy   • TRANS METATARSAL AMPUTATION Left 8/2/2022    Procedure: GREAT TOE AMPUTATION LEFT;  Surgeon: Gopal Márquez MD;  Location: [facility];  Service: Vascular;  Laterality: Left;       Family History:   Family History   Problem Relation Age of Onset   • Coronary artery disease Mother    • Diabetes Mother    • Cancer Father        Social History:   Social History     Socioeconomic History   • Marital status:    • Number of children: 2   Tobacco Use   • Smoking status: Never     Passive exposure: Past   •  Smokeless tobacco: Never   Vaping Use   • Vaping Use: Never used   Substance and Sexual Activity   • Alcohol use: Not Currently     Comment: every 2-3 months   • Drug use: No   • Sexual activity: Defer       Medications:     Current Outpatient Medications:   •  acetaminophen (TYLENOL) 325 MG tablet, Take 2 tablets by mouth Every 6 (Six) Hours As Needed for Mild Pain., Disp: , Rfl:   •  apixaban (ELIQUIS) 5 MG tablet tablet, Take 1 tablet by mouth Every 12 (Twelve) Hours. Indications: DVT/PE (active thrombosis), Disp: 180 tablet, Rfl: 0  •  aspirin 81 MG chewable tablet, Chew 1 tablet Daily., Disp: 90 tablet, Rfl: 3  •  atorvastatin (LIPITOR) 20 MG tablet, Take 1 tablet by mouth Every Night., Disp: 90 tablet, Rfl: 3  •  bumetanide (BUMEX) 1 MG tablet, Take 1 tablet by mouth 2 (Two) Times a Day., Disp: 60 tablet, Rfl: 6  •  carvedilol (COREG) 3.125 MG tablet, Take 1 tablet by mouth 2 (Two) Times a Day With Meals., Disp: , Rfl:   •  Cholecalciferol (VITAMIN D3) 5000 units tablet tablet, Take 1 tablet by mouth Daily., Disp: , Rfl:   •  Diclofenac Sodium (VOLTAREN) 1 % gel gel, Apply 2 g topically to the appropriate area as directed 4 (Four) Times a Day As Needed (pain)., Disp: , Rfl:   •  doxycycline (VIBRAMYICN) 100 MG tablet, Take 1 tablet by mouth 2 (Two) Times a Day., Disp: , Rfl:   •  escitalopram (Lexapro) 5 MG tablet, Take 1 tablet by mouth Every Other Day., Disp: 90 tablet, Rfl: 1  •  famotidine (PEPCID) 20 MG tablet, Take 1 tablet by mouth Daily., Disp: 90 tablet, Rfl: 3  •  insulin detemir (LEVEMIR) 100 UNIT/ML injection, Inject 8 Units under the skin into the appropriate area as directed Every Night. (Patient taking differently: Inject 8 Units under the skin into the appropriate area as directed Daily.), Disp: , Rfl: 12  •  Insulin Lispro (humaLOG) 100 UNIT/ML injection, Inject 3 Units under the skin into the appropriate area as directed 3 (Three) Times a Day Before Meals. Under 150 3 unit 150-200 4 units  200-250- 5 unit, Disp: , Rfl:   •  levothyroxine (SYNTHROID, LEVOTHROID) 88 MCG tablet, Take 1 tablet by mouth Every Morning., Disp: 90 tablet, Rfl: 3  •  magnesium oxide (MAG-OX) 400 MG tablet, Take 1 tablet by mouth Daily., Disp: 90 tablet, Rfl: 3  •  Misc. Devices (Rollator Ultra-Light) misc, 1 each Daily., Disp: 1 each, Rfl: 0  •  nitroglycerin (NITROSTAT) 0.4 MG SL tablet, Place 1 tablet under the tongue Every 5 (Five) Minutes As Needed for Chest Pain. Take no more than 3 doses in 15 minutes., Disp: 9 tablet, Rfl: 12  •  nystatin (MYCOSTATIN) 238748 UNIT/GM powder, APPLY  POWDER TOPICALLY TO AFFECTED AREA THREE TIMES DAILY, Disp: 60 g, Rfl: 0  •  Probiotic Product (PROBIOTIC DAILY PO), Take  by mouth Daily., Disp: , Rfl:   •  sacubitril-valsartan (Entresto) 24-26 MG tablet, Take 1 tablet by mouth 2 (Two) Times a Day., Disp: 60 tablet, Rfl: 0  •  tamsulosin (FLOMAX) 0.4 MG capsule 24 hr capsule, Take 1 capsule by mouth Daily., Disp: 30 capsule, Rfl: 0  •  Xiidra 5 % ophthalmic solution, Administer 1 drop to both eyes 2 (Two) Times a Day., Disp: , Rfl:   No current facility-administered medications for this visit.    Facility-Administered Medications Ordered in Other Visits:   •  Chlorhexidine Gluconate Cloth 2 % pads 1 application, 1 application, Topical, Q12H PRN, Mohan Arauz PA    Allergies:   Allergies   Allergen Reactions   • Vancomycin Other (See Comments)     Kidney failure per last admission       IPSS Questionnaire (AUA-7):  Over the past month…    1)  Incomplete Emptying  How often have you had a sensation of not emptying your bladder?  0 - Not at all   2)  Frequency  How often have you had to urinate less than every two hours? 4 - More than half the time   3)  Intermittency  How often have you found you stopped and started again several times when you urinated?  0 - Not at all   4) Urgency  How often have you found it difficult to postpone urination?  1 - Less than 1 time in 5   5) Weak  "Stream  How often have you had a weak urinary stream?  0 - Not at all   6) Straining  How often have you had to push or strain to begin urination?  0 - Not at all   7) Nocturia  How many times did you typically get up at night to urinate?  3 - 3 times   Total Score:  8   The International Prostate Symptom Score (IPSS) is used to screen, diagnose, track symptoms of benign prostatic hyperplasia (BPH).    0-7 pts (Mild Symptoms)  / 8-19 pts (Moderate) / 20-35 (Severe)    Quality of life due to urinary symptoms:  If you were to spend the rest of your life with your urinary condition the way it is now, how would you feel about that? 2-Mostly Satisfied   Urine Leakage (Incontinence) 0-No Leakage            Objective     Physical Exam:   Vital Signs:   Vitals:    03/14/23 1036   BP: 112/72   Pulse: 87   SpO2: 97%   Weight: 88.9 kg (196 lb)   Height: 190.6 cm (75.03\")   PainSc: 0-No pain     Body mass index is 24.48 kg/m².     Physical Exam  Vitals and nursing note reviewed.   Constitutional:       General: He is awake. He is not in acute distress.     Appearance: Normal appearance. He is well-developed.   HENT:      Head: Normocephalic and atraumatic.      Right Ear: External ear normal.      Left Ear: External ear normal.      Nose: Nose normal.   Eyes:      Conjunctiva/sclera: Conjunctivae normal.   Pulmonary:      Effort: Pulmonary effort is normal.   Abdominal:      General: There is no distension.      Palpations: Abdomen is soft. There is no mass.      Tenderness: There is no abdominal tenderness. There is no right CVA tenderness, left CVA tenderness, guarding or rebound.      Hernia: No hernia is present. There is no hernia in the left inguinal area or right inguinal area.   Genitourinary:     Pubic Area: No rash.       Rectum: No mass or tenderness. Normal anal tone.   Musculoskeletal:      Cervical back: Normal range of motion.   Lymphadenopathy:      Lower Body: No right inguinal adenopathy. No left inguinal " adenopathy.   Skin:     General: Skin is warm.   Neurological:      General: No focal deficit present.      Mental Status: He is alert and oriented to person, place, and time.   Psychiatric:         Behavior: Behavior normal. Behavior is cooperative.         Labs:   Hematocrit (%)   Date Value   01/25/2023 36.6 (L)   01/20/2023 36.0 (L)   01/18/2023 31.4 (L)   01/17/2023 34.6 (L)   01/16/2023 35.9 (L)   01/10/2023 37.2 (L)       Lab Results   Component Value Date    PSA 14.400 (H) 11/14/2022    PSA 20.200 (H) 10/28/2022       Images:   CT Chest Without Contrast Diagnostic    Result Date: 11/14/2022  1.  Small to moderate bilateral pleural effusions with mild adjacent consolidation, likely atelectasis, but pneumonia is not excluded. 2.  Body wall edema and small amount of ascites which may indicate heart failure/volume overload. 3.  Cardiomegaly. Calcific atherosclerosis. 4.  Cholelithiasis.  This report was finalized on 11/14/2022 11:13 PM by Eyad Wilburn MD.      XR Toe 2+ View Left    Result Date: 1/16/2023  Impression: Suspected destructive changes of the first interphalangeal joint which may be related to septic arthritis/osteomyelitis. No additional acute osseous abnormality. Electronically Signed: Sandy Ayers  1/16/2023 9:15 PM EST  Workstation ID: [id number]    XR Chest 1 View    Result Date: 1/16/2023  Impression: No acute cardiopulmonary process. Stable cardiomegaly. Electronically Signed: Sandy Ayers  1/16/2023 8:05 PM EST  Workstation ID: [id number]    XR Chest 1 View    Result Date: 11/14/2022  1.  Cardiomegaly. 2.  Small pleural effusions. 3.  Mild left basilar opacities which may represent atelectasis or infiltrate.  This report was finalized on 11/14/2022 9:45 PM by Eyad Wilburn MD.      XR Abdomen KUB    Result Date: 1/20/2023  Impression: 1. Moderate amount of stool. 2. No evidence of bowel obstruction. Electronically Signed: Jaison Longoria  1/20/2023 8:07 PM EST  Workstation ID: [id number]      Measures:    Tobacco:   Jermaine Singh  reports that he has never smoked. He has been exposed to tobacco smoke. He has never used smokeless tobacco..    Urine Incontinence: Patient reports that he is not currently experiencing any symptoms of urinary incontinence.         Assessment / Plan      Assessment/Plan:   Mr. Singh is a 77 y.o. male with elevated PSA.  I discussed his options and he would like to try and avoid a biopsy if possible.  I think that is very reasonable given his over all health.  I will repeat his PSA in 2 months which will be 6 months from her previous PSA.      Diagnoses and all orders for this visit:    1. Elevated prostate specific antigen (PSA) (Primary)  -     PSA DIAGNOSTIC; Future        Patient Education:   I discussed with Mr. Singh the possible causes of an elevated PSA.  I discussed that his options are to repeat his PSA in 6 months or move directly to biopsy.  I discussed the possible role of a multiparametric MRI.  We discussed that repeating a PSA in 6 months may delay his diagnosis or prostate cancer and potentially delay treatment.      Follow Up:   Return in about 2 months (around 5/14/2023).    I spent approximately 30 minutes providing clinical care for this patient; including review of patient's chart and provider documentation, face to face time spent with patient in examination room (obtaining history, performing physical exam, discussing diagnosis and management options), placing orders, and completing patient documentation.     Rudy Dixon MD  [facility] – [facility] [place]

## 2023-03-22 ENCOUNTER — OFFICE VISIT (OUTPATIENT)
Dept: INTERNAL MEDICINE | Facility: CLINIC | Age: 78
End: 2023-03-22
Payer: MEDICARE

## 2023-03-22 VITALS
SYSTOLIC BLOOD PRESSURE: 124 MMHG | OXYGEN SATURATION: 99 % | BODY MASS INDEX: 24.25 KG/M2 | DIASTOLIC BLOOD PRESSURE: 70 MMHG | WEIGHT: 195 LBS | HEART RATE: 61 BPM | HEIGHT: 75 IN | TEMPERATURE: 97.7 F

## 2023-03-22 DIAGNOSIS — N18.32 STAGE 3B CHRONIC KIDNEY DISEASE: ICD-10-CM

## 2023-03-22 DIAGNOSIS — K40.90 RIGHT INGUINAL HERNIA: ICD-10-CM

## 2023-03-22 DIAGNOSIS — E11.42 DIABETIC POLYNEUROPATHY ASSOCIATED WITH TYPE 2 DIABETES MELLITUS: ICD-10-CM

## 2023-03-22 DIAGNOSIS — E78.2 MIXED HYPERLIPIDEMIA: Primary | Chronic | ICD-10-CM

## 2023-03-22 DIAGNOSIS — R04.0 EPISTAXIS: ICD-10-CM

## 2023-03-22 DIAGNOSIS — I50.22 CHRONIC HFREF (HEART FAILURE WITH REDUCED EJECTION FRACTION): ICD-10-CM

## 2023-03-22 DIAGNOSIS — E11.59 TYPE 2 DIABETES MELLITUS WITH OTHER CIRCULATORY COMPLICATION, WITH LONG-TERM CURRENT USE OF INSULIN: Chronic | ICD-10-CM

## 2023-03-22 DIAGNOSIS — R97.20 ELEVATED PSA: ICD-10-CM

## 2023-03-22 DIAGNOSIS — S98.112A AMPUTATION OF LEFT GREAT TOE: ICD-10-CM

## 2023-03-22 DIAGNOSIS — Z79.4 TYPE 2 DIABETES MELLITUS WITH OTHER CIRCULATORY COMPLICATION, WITH LONG-TERM CURRENT USE OF INSULIN: Chronic | ICD-10-CM

## 2023-03-22 NOTE — PROGRESS NOTES
Establish Care (Needing referral to podiatrist and urologist and would like to have feet looked at today), Diabetes (See's endo at ), Hypertension, Hyperlipidemia, Weight Loss, and Nose Bleed (Last week)    Subjective   Jermaine Singh is a 77 y.o. male is here today to establish care.    History of Present Illness   Pt is accompanied by his wife, Nerissa. With DM2, HTN, HLP, s/p 3 V CABG, and pacemaker would like to establish care. Graciela Quinones ( ) son.  Nose bleed x 2-3 days , over the last 2-3 weeks. On     The patient presents today to establish care.  He has seen Dr. Oro recently for his foot. He stepped on a tortus shell in the backyard in 2022 and infected the toe. He was seeing Dr. Joseph for his feet. He was given antibiotics, but it was not enough. He was taken to the hospital and dislocated his shoulder. He was kept in the hospital and had the toe removed. He was recovering, but he had congestive heart failure due to the vancomycin. He was in [facility] and they brought him home. His medications had been changed by his regular cardiologist. He was back in the hospital with congestive heart failure. After he got over, they felt like they would take the second toe off because it was not healing. They took the second toe off in 2023. He is somewhat weightbearing, but he has not fully recovered physically to where he can walk without support. He has home therapy to help. He would like to get a new podiatrist.    He has hernias. But was advised to wait on it until his foot issue resolved.  His PSA is elevated and seen by [facility], would like a 2nd opinion.  He has a skin irritation on his left foot. He has been putting iodine on it. He has brown spots. He has a new place on his right foot that popped up. He has home health come in at least once a week. He is able to get up and walk.    He lost 5 pounds overnight. He drinks 3 bottles of water a day. He drinks coffee and sometimes  he will have a protein drink. He drinks about 52 ounces of liquid a day.    His cardiologist thought he had too much edema in his foot. He is taking bumetanide 1 mg. He will not take it twice a day. He backed him off for the last 2 days because he was urinating so much and losing weight. He weighs 196 pounds today.    He had a nosebleed 2 weeks ago. He will have a nosebleed for 2 to 3 days in a row. He has not had anything this week. He has never had these kind of nosebleeds in the past. He has been taking Eliquis since 07/2022. He is taking aspirin 81 mg.    He sees endocrinology. His blood glucose is stable. He uses a Dexcom. His blood glucose is running between 130 to 190. His blood glucose will jump over 200 if he eats something that has a lot of sugar in it. His blood glucose is about 139 when he first gets up and as the day goes on.    He denies any numbness or tingling in his hands. He denies any dizzy spells. He is feeling tired and cold. He had an echocardiogram in the hospital and a stress test in 10/2022. He had a heart catheterization in 10/2022. He had bypass surgery 3 years ago. He has a pacemaker. His pacemaker was placed in 05/2022. He had a heart catheterization in 2019. He had a 100% blockage in his right coronary artery.    He does not see a nephrologist. His creatinine was elevated when he was in the hospital.    He has a right inguinal hernia. He is not moving around a tremendous amount.    He has not started the Lexapro. He does not feel like he needs it right now.    He is doing home health. He is able to walk from one end of the house to the other. He uses a walker.    He has a laceration on his leg. He had a major reconstruction.    He had stents placed in his leg. He does not remember having a blood clot in his leg.    He has a follow-up appointment with Dr. Oro in 05/2023 or 06/2023. He was told to put Betadine on it for 2 weeks.       Current Outpatient Medications:   •  acetaminophen  (TYLENOL) 325 MG tablet, Take 2 tablets by mouth Every 6 (Six) Hours As Needed for Mild Pain., Disp: , Rfl:   •  apixaban (ELIQUIS) 5 MG tablet tablet, Take 1 tablet by mouth Every 12 (Twelve) Hours. Indications: DVT/PE (active thrombosis), Disp: 180 tablet, Rfl: 0  •  aspirin 81 MG chewable tablet, Chew 1 tablet Daily., Disp: 90 tablet, Rfl: 3  •  atorvastatin (LIPITOR) 20 MG tablet, Take 1 tablet by mouth Every Night., Disp: 90 tablet, Rfl: 3  •  bumetanide (BUMEX) 1 MG tablet, Take 1 tablet by mouth 2 (Two) Times a Day. (Patient taking differently: Take 1 tablet by mouth 2 (Two) Times a Day. Qd-bid), Disp: 60 tablet, Rfl: 6  •  carvedilol (COREG) 3.125 MG tablet, Take 1 tablet by mouth 2 (Two) Times a Day With Meals., Disp: , Rfl:   •  Cholecalciferol (VITAMIN D3) 5000 units tablet tablet, Take 1 tablet by mouth Daily., Disp: , Rfl:   •  Diclofenac Sodium (VOLTAREN) 1 % gel gel, Apply 2 g topically to the appropriate area as directed 4 (Four) Times a Day As Needed (pain)., Disp: , Rfl:   •  famotidine (PEPCID) 20 MG tablet, Take 1 tablet by mouth Daily., Disp: 90 tablet, Rfl: 3  •  insulin detemir (LEVEMIR) 100 UNIT/ML injection, Inject 8 Units under the skin into the appropriate area as directed Every Night. (Patient taking differently: Inject 8 Units under the skin into the appropriate area as directed Daily.), Disp: , Rfl: 12  •  Insulin Lispro (humaLOG) 100 UNIT/ML injection, Inject 3 Units under the skin into the appropriate area as directed 3 (Three) Times a Day Before Meals. Under 150 3 unit 150-200 4 units 200-250- 5 unit, Disp: , Rfl:   •  levothyroxine (SYNTHROID, LEVOTHROID) 88 MCG tablet, Take 1 tablet by mouth Every Morning., Disp: 90 tablet, Rfl: 3  •  magnesium oxide (MAG-OX) 400 MG tablet, Take 1 tablet by mouth Daily., Disp: 90 tablet, Rfl: 3  •  Misc. Devices (Rollator Ultra-Light) misc, 1 each Daily., Disp: 1 each, Rfl: 0  •  nitroglycerin (NITROSTAT) 0.4 MG SL tablet, Place 1 tablet under the  "tongue Every 5 (Five) Minutes As Needed for Chest Pain. Take no more than 3 doses in 15 minutes., Disp: 9 tablet, Rfl: 12  •  nystatin (MYCOSTATIN) 536771 UNIT/GM powder, APPLY  POWDER TOPICALLY TO AFFECTED AREA THREE TIMES DAILY, Disp: 60 g, Rfl: 0  •  sacubitril-valsartan (Entresto) 24-26 MG tablet, Take 1 tablet by mouth 2 (Two) Times a Day., Disp: 60 tablet, Rfl: 0  •  tamsulosin (FLOMAX) 0.4 MG capsule 24 hr capsule, Take 1 capsule by mouth Daily., Disp: 30 capsule, Rfl: 0  •  Xiidra 5 % ophthalmic solution, Administer 1 drop to both eyes 2 (Two) Times a Day., Disp: , Rfl:   •  glucose blood (OneTouch Verio) test strip, 1 each by Other route 2 (Two) Times a Day. Use as instructed, Disp: 100 each, Rfl: 3  No current facility-administered medications for this visit.    Facility-Administered Medications Ordered in Other Visits:   •  Chlorhexidine Gluconate Cloth 2 % pads 1 application, 1 application, Topical, Q12H PRN, Mohan Arauz PA      The following portions of the patient's history were reviewed and updated as appropriate: allergies, current medications, past family history, past medical history, past social history, past surgical history and problem list.    Review of Systems   Constitutional: Negative.  Negative for chills and fever.   HENT: Positive for nosebleeds. Negative for ear discharge, ear pain, sinus pressure and sore throat.    Respiratory: Negative for cough, chest tightness and shortness of breath.    Cardiovascular: Positive for leg swelling. Negative for chest pain and palpitations.   Gastrointestinal: Negative for diarrhea, nausea and vomiting.   Musculoskeletal: Negative for arthralgias, back pain and myalgias.   Skin: Positive for wound (opn feet and rt leg).   Neurological: Negative for dizziness, syncope and headaches.   Psychiatric/Behavioral: Negative for confusion and sleep disturbance.       Objective   /70   Pulse 61   Temp 97.7 °F (36.5 °C)   Ht 190.6 cm (75.03\")   " Wt 88.5 kg (195 lb) Comment: per pt  SpO2 99% Comment: ra  BMI 24.35 kg/m²   Physical Exam  Vitals and nursing note reviewed.   Constitutional:       Appearance: He is well-developed.   HENT:      Head: Normocephalic and atraumatic.      Right Ear: External ear normal.      Left Ear: External ear normal.      Mouth/Throat:      Pharynx: No oropharyngeal exudate.   Eyes:      Conjunctiva/sclera: Conjunctivae normal.      Pupils: Pupils are equal, round, and reactive to light.   Neck:      Thyroid: No thyromegaly.   Cardiovascular:      Rate and Rhythm: Normal rate and regular rhythm.      Pulses: Normal pulses.      Heart sounds: Normal heart sounds. No murmur heard.    No friction rub. No gallop.   Pulmonary:      Effort: Pulmonary effort is normal.      Breath sounds: Normal breath sounds.   Abdominal:      General: Bowel sounds are normal. There is no distension.      Palpations: Abdomen is soft.      Tenderness: There is no abdominal tenderness.   Musculoskeletal:      Cervical back: Neck supple.   Skin:     General: Skin is warm and dry.      Findings: Lesion (rt leg wound and scab in hif foot and left surgical wound) present.   Neurological:      Mental Status: He is alert and oriented to person, place, and time.      Cranial Nerves: No cranial nerve deficit.   Psychiatric:         Judgment: Judgment normal.           Results for orders placed or performed in visit on 01/25/23   Comprehensive Metabolic Panel    Specimen: Blood   Result Value Ref Range    Glucose 170 (H) 65 - 99 mg/dL    BUN 50 (H) 8 - 23 mg/dL    Creatinine 2.05 (H) 0.76 - 1.27 mg/dL    Sodium 138 136 - 145 mmol/L    Potassium 4.5 3.5 - 5.2 mmol/L    Chloride 104 98 - 107 mmol/L    CO2 25.5 22.0 - 29.0 mmol/L    Calcium 8.5 (L) 8.6 - 10.5 mg/dL    Total Protein 5.8 (L) 6.0 - 8.5 g/dL    Albumin 3.4 (L) 3.5 - 5.2 g/dL    ALT (SGPT) 19 1 - 41 U/L    AST (SGOT) 25 1 - 40 U/L    Alkaline Phosphatase 70 39 - 117 U/L    Total Bilirubin 0.2 0.0 -  1.2 mg/dL    Globulin 2.4 gm/dL    A/G Ratio 1.4 g/dL    BUN/Creatinine Ratio 24.4 7.0 - 25.0    Anion Gap 8.5 5.0 - 15.0 mmol/L    eGFR 32.8 (L) >60.0 mL/min/1.73   CBC Auto Differential    Specimen: Blood   Result Value Ref Range    WBC 7.50 3.40 - 10.80 10*3/mm3    RBC 4.18 4.14 - 5.80 10*6/mm3    Hemoglobin 11.8 (L) 13.0 - 17.7 g/dL    Hematocrit 36.6 (L) 37.5 - 51.0 %    MCV 87.6 79.0 - 97.0 fL    MCH 28.2 26.6 - 33.0 pg    MCHC 32.2 31.5 - 35.7 g/dL    RDW 14.7 12.3 - 15.4 %    RDW-SD 46.9 37.0 - 54.0 fl    MPV 13.0 (H) 6.0 - 12.0 fL    Platelets 174 140 - 450 10*3/mm3   Manual Differential    Specimen: Blood   Result Value Ref Range    Neutrophil % 77.8 (H) 42.7 - 76.0 %    Lymphocyte % 11.1 (L) 19.6 - 45.3 %    Monocyte % 6.7 5.0 - 12.0 %    Eosinophil % 3.3 0.3 - 6.2 %    Basophil % 1.1 0.0 - 1.5 %    Neutrophils Absolute 5.84 1.70 - 7.00 10*3/mm3    Lymphocytes Absolute 0.83 0.70 - 3.10 10*3/mm3    Monocytes Absolute 0.50 0.10 - 0.90 10*3/mm3    Eosinophils Absolute 0.25 0.00 - 0.40 10*3/mm3    Basophils Absolute 0.08 0.00 - 0.20 10*3/mm3    RBC Morphology Normal Normal    Smudge Cells Slight/1+ None Seen    Platelet Morphology Normal Normal             Assessment & Plan   Diagnoses and all orders for this visit:    Mixed hyperlipidemia  -     Comprehensive Metabolic Panel; Future  -     Lipid Panel; Future    Type 2 diabetes mellitus with other circulatory complication, with long-term current use of insulin (HCC)  -     glucose blood (OneTouch Verio) test strip; 1 each by Other route 2 (Two) Times a Day. Use as instructed    Stage 3a chronic kidney disease (HCC)  Comments:  check labs and refer to nephrology if high.    Chronic HFrEF (heart failure with reduced ejection fraction) (HCC)  -     TSH; Future  -     proBNP    Right inguinal hernia  Comments:  has appt with Dr. Velasco.    Epistaxis  Comments:  prn afrin, will need ent eval if persisting.  Orders:  -     CBC (No Diff); Future    Elevated PSA  -      Ambulatory Referral to Urology    Diabetic polyneuropathy associated with type 2 diabetes mellitus (HCC)  -     Ambulatory Referral to Podiatry    Amputation of left great toe (HCC)  -     Ambulatory Referral to Podiatry           Total time spent today with Jermaine Singh  was 46 minutes .  Of this time, 40 min was spent face-to-face time coordinating care, and in the following activities:preparing for the visit, reviewing tests, obtaining and/or reviewing a separately obtained history, performing a medically appropriate examination and/or evaluation , counseling and educating the patient/family/caregiver, ordering medications, tests, or procedures, referring and communicating with other health care professionals , documenting information in the medical record and independently interpreting results and communicating that information with the patient/family/caregiver      Return in about 2 months (around 5/22/2023).    Electronically signed by:    Marysol Shrestha MD     Transcribed from ambient dictation for Marysol Shrestha MD by Marilee Fisher, Quality .  03/22/23   13:22 EDT    Patient or patient representative verbalized consent to the visit recording.  I have personally performed the services described in this document as transcribed by the above individual, and it is both accurate and complete.  Marysol Shrestha MD  3/23/2023  23:05 EDT

## 2023-03-27 ENCOUNTER — TELEPHONE (OUTPATIENT)
Dept: FAMILY MEDICINE CLINIC | Facility: CLINIC | Age: 78
End: 2023-03-27
Payer: MEDICARE

## 2023-03-27 NOTE — TELEPHONE ENCOUNTER
[facility] WOULD LIKE TO LET THE DOCTOR KNOW THAT THEY WILL BE SEEING THE PATIENT ONCE EVERY OTHER WEEK FOR 8 WEEKS AND THAT THEY WILL BE FOCUSING ON WOUND CARE     CALL [phone]

## 2023-03-29 ENCOUNTER — LAB (OUTPATIENT)
Dept: LAB | Facility: HOSPITAL | Age: 78
End: 2023-03-29
Payer: MEDICARE

## 2023-03-29 DIAGNOSIS — I50.22 CHRONIC HFREF (HEART FAILURE WITH REDUCED EJECTION FRACTION): ICD-10-CM

## 2023-03-29 DIAGNOSIS — R04.0 EPISTAXIS: ICD-10-CM

## 2023-03-29 DIAGNOSIS — R97.20 ELEVATED PROSTATE SPECIFIC ANTIGEN (PSA): ICD-10-CM

## 2023-03-29 DIAGNOSIS — E78.2 MIXED HYPERLIPIDEMIA: Chronic | ICD-10-CM

## 2023-03-29 LAB
ALBUMIN SERPL-MCNC: 4.1 G/DL (ref 3.5–5.2)
ALBUMIN/GLOB SERPL: 1.5 G/DL
ALP SERPL-CCNC: 85 U/L (ref 39–117)
ALT SERPL W P-5'-P-CCNC: 16 U/L (ref 1–41)
ANION GAP SERPL CALCULATED.3IONS-SCNC: 11 MMOL/L (ref 5–15)
AST SERPL-CCNC: 11 U/L (ref 1–40)
BILIRUB SERPL-MCNC: 0.3 MG/DL (ref 0–1.2)
BUN SERPL-MCNC: 53 MG/DL (ref 8–23)
BUN/CREAT SERPL: 28.3 (ref 7–25)
CALCIUM SPEC-SCNC: 9.1 MG/DL (ref 8.6–10.5)
CHLORIDE SERPL-SCNC: 108 MMOL/L (ref 98–107)
CHOLEST SERPL-MCNC: 128 MG/DL (ref 0–200)
CO2 SERPL-SCNC: 23 MMOL/L (ref 22–29)
CREAT SERPL-MCNC: 1.87 MG/DL (ref 0.76–1.27)
DEPRECATED RDW RBC AUTO: 49.7 FL (ref 37–54)
EGFRCR SERPLBLD CKD-EPI 2021: 36.3 ML/MIN/1.73
ERYTHROCYTE [DISTWIDTH] IN BLOOD BY AUTOMATED COUNT: 15 % (ref 12.3–15.4)
GLOBULIN UR ELPH-MCNC: 2.7 GM/DL
GLUCOSE SERPL-MCNC: 167 MG/DL (ref 65–99)
HCT VFR BLD AUTO: 35.2 % (ref 37.5–51)
HDLC SERPL-MCNC: 52 MG/DL (ref 40–60)
HGB BLD-MCNC: 11.4 G/DL (ref 13–17.7)
LDLC SERPL CALC-MCNC: 63 MG/DL (ref 0–100)
LDLC/HDLC SERPL: 1.22 {RATIO}
MCH RBC QN AUTO: 29.3 PG (ref 26.6–33)
MCHC RBC AUTO-ENTMCNC: 32.4 G/DL (ref 31.5–35.7)
MCV RBC AUTO: 90.5 FL (ref 79–97)
NT-PROBNP SERPL-MCNC: ABNORMAL PG/ML (ref 0–1800)
PLATELET # BLD AUTO: 153 10*3/MM3 (ref 140–450)
PMV BLD AUTO: 12.9 FL (ref 6–12)
POTASSIUM SERPL-SCNC: 4.8 MMOL/L (ref 3.5–5.2)
PROT SERPL-MCNC: 6.8 G/DL (ref 6–8.5)
PSA SERPL-MCNC: 6.72 NG/ML (ref 0–4)
RBC # BLD AUTO: 3.89 10*6/MM3 (ref 4.14–5.8)
SODIUM SERPL-SCNC: 142 MMOL/L (ref 136–145)
TRIGL SERPL-MCNC: 64 MG/DL (ref 0–150)
TSH SERPL DL<=0.05 MIU/L-ACNC: 3.78 UIU/ML (ref 0.27–4.2)
VLDLC SERPL-MCNC: 13 MG/DL (ref 5–40)
WBC NRBC COR # BLD: 5.84 10*3/MM3 (ref 3.4–10.8)

## 2023-03-29 PROCEDURE — 83880 ASSAY OF NATRIURETIC PEPTIDE: CPT | Performed by: INTERNAL MEDICINE

## 2023-03-29 PROCEDURE — 36415 COLL VENOUS BLD VENIPUNCTURE: CPT

## 2023-03-29 PROCEDURE — 80061 LIPID PANEL: CPT

## 2023-03-29 PROCEDURE — 85027 COMPLETE CBC AUTOMATED: CPT

## 2023-03-29 PROCEDURE — 80053 COMPREHEN METABOLIC PANEL: CPT

## 2023-03-29 PROCEDURE — 84153 ASSAY OF PSA TOTAL: CPT

## 2023-03-29 PROCEDURE — 84443 ASSAY THYROID STIM HORMONE: CPT

## 2023-04-03 ENCOUNTER — TELEPHONE (OUTPATIENT)
Dept: INTERNAL MEDICINE | Facility: CLINIC | Age: 78
End: 2023-04-03
Payer: MEDICARE

## 2023-04-03 RX ORDER — TAMSULOSIN HYDROCHLORIDE 0.4 MG/1
0.4 CAPSULE ORAL DAILY
Qty: 90 CAPSULE | Refills: 1 | Status: SHIPPED | OUTPATIENT
Start: 2023-04-03

## 2023-04-03 NOTE — TELEPHONE ENCOUNTER
Caller: HIPOLITO DAWN    Relationship: Emergency Contact    Best call back number: [phone]      What specialty or service is being requested: PODIATRY      Any additional details: PATIENTS WIFE IS REQUESTING A CALLBACK REGARDING THE PODIATRY REFERRAL

## 2023-04-03 NOTE — TELEPHONE ENCOUNTER
Caller: LÓPEZHIPOLITO    Relationship: Emergency Contact    Best call back number: [phone]    Requested Prescriptions:   Requested Prescriptions     Pending Prescriptions Disp Refills   • tamsulosin (FLOMAX) 0.4 MG capsule 24 hr capsule 30 capsule 0     Sig: Take 1 capsule by mouth Daily.        Pharmacy where request should be sent: [facility] [address] [place] [address] [phone] [facility] [phone]      Last office visit with prescribing clinician: 3/22/2023   Last telemedicine visit with prescribing clinician: 5/23/2023   Next office visit with prescribing clinician: 5/23/2023     Additional details provided by patient: PATIENT STARTED THIS MEDICATION IN THE HOSPITAL AND NEEDS A REFILL    Does the patient have less than a 3 day supply:  [] Yes  [x] No    Would you like a call back once the refill request has been completed: [] Yes [x] No    If the office needs to give you a call back, can they leave a voicemail: [] Yes [x] No    Ata Crouch Rep   04/03/23 09:58 EDT

## 2023-04-10 ENCOUNTER — TELEPHONE (OUTPATIENT)
Dept: INTERNAL MEDICINE | Facility: CLINIC | Age: 78
End: 2023-04-10
Payer: MEDICARE

## 2023-04-10 DIAGNOSIS — B49 FUNGAL INFECTION: ICD-10-CM

## 2023-04-10 RX ORDER — NYSTATIN 100000 [USP'U]/G
POWDER TOPICAL 2 TIMES DAILY
Qty: 60 G | Refills: 5 | Status: SHIPPED | OUTPATIENT
Start: 2023-04-10

## 2023-04-10 NOTE — TELEPHONE ENCOUNTER
Caller: JEANNIE VALLE    Relationship:     Best call back number: [phone]    Requested Prescriptions:   Requested Prescriptions     Pending Prescriptions Disp Refills   • nystatin (MYCOSTATIN) 695354 UNIT/GM powder 60 g 0        Pharmacy where request should be sent: [facility] [address] [place] [address] [phone] [facility] [phone]      Last office visit with prescribing clinician: 3/22/2023   Last telemedicine visit with prescribing clinician: 5/23/2023   Next office visit with prescribing clinician: 5/23/2023     Additional details provided by patient:     Does the patient have less than a 3 day supply:  [x] Yes  [] No    Would you like a call back once the refill request has been completed: [] Yes [x] No    If the office needs to give you a call back, can they leave a voicemail: [] Yes [x] No    Jose Singletary, PCT   04/10/23 16:04 EDT

## 2023-04-13 ENCOUNTER — TELEPHONE (OUTPATIENT)
Dept: INTERNAL MEDICINE | Facility: CLINIC | Age: 78
End: 2023-04-13
Payer: MEDICARE

## 2023-04-13 NOTE — TELEPHONE ENCOUNTER
"Caller: Jermaine Singh \"Adi\"    Relationship: Self    Best call back number: [phone]    What is the medical concern/diagnosis: MOBILITY ISSUES    What specialty or service is being requested: OUT PATIENT THERAPY    What is the provider, practice or medical service name: [facility]    What is the office location: [place], [place]    Any additional details: HOME HEALTH THERAPIST SUGGESTED HE RECEIVE OUT PATIENT THERAPY TO GET BETTER MOBILITY.  "

## 2023-05-09 ENCOUNTER — TELEPHONE (OUTPATIENT)
Dept: INTERNAL MEDICINE | Facility: CLINIC | Age: 78
End: 2023-05-09
Payer: MEDICARE

## 2023-05-09 NOTE — TELEPHONE ENCOUNTER
DOING PHYSICAL THERAPY RIGHT NOW AT [facility], HE BELIEVES HE WILL NEED HERNIA SURGERY. HE'S GOT AN APPT ON 5-23-23 BUT HIS WIFE DOESN'T KNOW IF HE SHOULD WAIT UNTIL THEN. PLEASE CALL HIPOLITO (WIFE) BACK [phone]

## 2023-05-09 NOTE — TELEPHONE ENCOUNTER
Right groin has been buldging.  Can be painful if touches it the wrong way.  Would you like to see him sooner for eval?

## 2023-05-09 NOTE — TELEPHONE ENCOUNTER
We have openings next Tuesday on the 16th-   If that gets taken up, we can schedule him at 2 pm the same day.( I blocked it)

## 2023-05-16 ENCOUNTER — OFFICE VISIT (OUTPATIENT)
Dept: INTERNAL MEDICINE | Facility: CLINIC | Age: 78
End: 2023-05-16
Payer: MEDICARE

## 2023-05-16 VITALS
OXYGEN SATURATION: 99 % | SYSTOLIC BLOOD PRESSURE: 128 MMHG | WEIGHT: 187 LBS | DIASTOLIC BLOOD PRESSURE: 84 MMHG | HEIGHT: 75 IN | HEART RATE: 62 BPM | TEMPERATURE: 97.5 F | BODY MASS INDEX: 23.25 KG/M2

## 2023-05-16 DIAGNOSIS — K59.04 CHRONIC IDIOPATHIC CONSTIPATION: ICD-10-CM

## 2023-05-16 DIAGNOSIS — K40.30: Primary | ICD-10-CM

## 2023-05-16 PROCEDURE — 3079F DIAST BP 80-89 MM HG: CPT | Performed by: INTERNAL MEDICINE

## 2023-05-16 PROCEDURE — 99214 OFFICE O/P EST MOD 30 MIN: CPT | Performed by: INTERNAL MEDICINE

## 2023-05-16 PROCEDURE — 3074F SYST BP LT 130 MM HG: CPT | Performed by: INTERNAL MEDICINE

## 2023-05-16 RX ORDER — LOSARTAN POTASSIUM 100 MG/1
1 TABLET ORAL DAILY
COMMUNITY
Start: 2023-05-01

## 2023-05-16 NOTE — PROGRESS NOTES
Hernia (Right groin/)    Subjective   Jermaine Singh is a 78 y.o. male is here today for follow-up.    History of Present Illness     Rt inguinal bulge. Plan for Surgery by Dr. Velasco in Jan but postponed due to his OM surgery.    Constipation- severe, had used Mag citrate x 2.    The patient presents today for an acute visit. He is accompanied by an adult female.    He had a hernia surgery in 01/2023. He was supposed to have surgery with Dr. Velasco, but he never got to meet with him. Dr. Oro talked with him about doing it at the same time he did his toe. The adult female noticed a bulge when he was taking a shower the other day. He says it hurts when he tries to put his socks and shoes on. It is not sore. It does not go all the way down to his scrotum.    He has serious constipation. He had to cancel his nephrologist the other day because he was so constipated. He was in so much pain that they had to get some magnesium citrate. He has had to take it twice in the last 3 months. After he takes the magnesium, he has the opposite problem. He drinks prune juice every morning. He started back on probiotics. He did not take any of his medications yesterday or today. His last bowel movement was on 05/12/2023.    His blood pressure is borderline.    He has an appointment at the end of 05/2023 for an injection in his eye.    He has not had any more nosebleeds.    His ears are itching. He has not had his allergy shots since he has been sick.    Answers for HPI/ROS submitted by the patient on 5/10/2023  What is the primary reason for your visit?: Lower Extremity Injury  Incident occurred: more than 1 week ago  Incident location: other  Injury mechanism: other  Pain location: right hip  Pain quality: aching  Pain - numeric: 4/10  Pain course: intermittent  tingling: No  inability to bear weight: No  loss of motion: Yes  loss of sensation: No  muscle weakness: Yes  Foreign body present: no foreign bodies        Current Outpatient  Medications:   •  acetaminophen (TYLENOL) 325 MG tablet, Take 2 tablets by mouth Every 6 (Six) Hours As Needed for Mild Pain., Disp: , Rfl:   •  apixaban (ELIQUIS) 5 MG tablet tablet, Take 1 tablet by mouth Every 12 (Twelve) Hours. Indications: DVT/PE (active thrombosis), Disp: 180 tablet, Rfl: 0  •  aspirin 81 MG chewable tablet, Chew 1 tablet Daily., Disp: 90 tablet, Rfl: 3  •  atorvastatin (LIPITOR) 20 MG tablet, Take 1 tablet by mouth Every Night., Disp: 90 tablet, Rfl: 3  •  bumetanide (BUMEX) 1 MG tablet, Take 1 tablet by mouth 2 (Two) Times a Day. (Patient taking differently: Take 1 tablet by mouth 2 (Two) Times a Day. Qd-bid), Disp: 60 tablet, Rfl: 6  •  carvedilol (COREG) 3.125 MG tablet, Take 1 tablet by mouth 2 (Two) Times a Day With Meals., Disp: , Rfl:   •  Cholecalciferol (VITAMIN D3) 5000 units tablet tablet, Take 1 tablet by mouth Daily., Disp: , Rfl:   •  Diclofenac Sodium (VOLTAREN) 1 % gel gel, Apply 2 g topically to the appropriate area as directed 4 (Four) Times a Day As Needed (pain)., Disp: , Rfl:   •  famotidine (PEPCID) 20 MG tablet, Take 1 tablet by mouth Daily., Disp: 90 tablet, Rfl: 3  •  glucose blood (OneTouch Verio) test strip, 1 each by Other route 2 (Two) Times a Day. Use as instructed, Disp: 100 each, Rfl: 3  •  insulin detemir (LEVEMIR) 100 UNIT/ML injection, Inject 8 Units under the skin into the appropriate area as directed Every Night. (Patient taking differently: Inject 8 Units under the skin into the appropriate area as directed Daily.), Disp: , Rfl: 12  •  Insulin Lispro (humaLOG) 100 UNIT/ML injection, Inject 3 Units under the skin into the appropriate area as directed 3 (Three) Times a Day Before Meals. Under 150 3 unit 150-200 4 units 200-250- 5 unit, Disp: , Rfl:   •  levothyroxine (SYNTHROID, LEVOTHROID) 88 MCG tablet, Take 1 tablet by mouth Every Morning., Disp: 90 tablet, Rfl: 3  •  losartan (COZAAR) 100 MG tablet, Take 1 tablet by mouth Daily., Disp: , Rfl:   •   magnesium oxide (MAG-OX) 400 MG tablet, Take 1 tablet by mouth Daily., Disp: 90 tablet, Rfl: 3  •  Misc. Devices (Rollator Ultra-Light) misc, 1 each Daily., Disp: 1 each, Rfl: 0  •  nitroglycerin (NITROSTAT) 0.4 MG SL tablet, Place 1 tablet under the tongue Every 5 (Five) Minutes As Needed for Chest Pain. Take no more than 3 doses in 15 minutes., Disp: 9 tablet, Rfl: 12  •  nystatin (MYCOSTATIN) 121855 UNIT/GM powder, Apply  topically to the appropriate area as directed 2 (Two) Times a Day., Disp: 60 g, Rfl: 5  •  tamsulosin (FLOMAX) 0.4 MG capsule 24 hr capsule, Take 1 capsule by mouth Daily., Disp: 90 capsule, Rfl: 1  •  Xiidra 5 % ophthalmic solution, Administer 1 drop to both eyes 2 (Two) Times a Day., Disp: , Rfl:   •  linaclotide (Linzess) 145 MCG capsule capsule, Take 1 capsule by mouth Every Morning Before Breakfast. Start qod, Disp: 12 capsule, Rfl: 0  No current facility-administered medications for this visit.    Facility-Administered Medications Ordered in Other Visits:   •  Chlorhexidine Gluconate Cloth 2 % pads 1 application, 1 application, Topical, Q12H PRN, Mohan Arauz PA      The following portions of the patient's history were reviewed and updated as appropriate: allergies, current medications, past family history, past medical history, past social history, past surgical history and problem list.    Review of Systems   Constitutional: Negative for chills and fever.   HENT: Negative for ear discharge, ear pain, sinus pressure and sore throat.    Respiratory: Negative for cough, chest tightness and shortness of breath.    Cardiovascular: Negative for chest pain, palpitations and leg swelling.   Gastrointestinal: Positive for constipation. Negative for diarrhea, nausea and vomiting.   Genitourinary: Positive for scrotal swelling.   Musculoskeletal: Positive for arthralgias, gait problem and myalgias. Negative for back pain.   Neurological: Positive for weakness. Negative for dizziness, syncope  "and headaches.   Psychiatric/Behavioral: Negative for confusion and sleep disturbance.       Objective   /84   Pulse 62   Temp 97.5 °F (36.4 °C)   Ht 190.6 cm (75.03\")   Wt 84.8 kg (187 lb)   SpO2 99% Comment: ra  BMI 23.35 kg/m²   Physical Exam  Vitals and nursing note reviewed.   Constitutional:       Appearance: He is well-developed.   HENT:      Head: Normocephalic and atraumatic.      Right Ear: External ear normal.      Left Ear: External ear normal.      Mouth/Throat:      Pharynx: No oropharyngeal exudate.   Eyes:      Conjunctiva/sclera: Conjunctivae normal.      Pupils: Pupils are equal, round, and reactive to light.   Neck:      Thyroid: No thyromegaly.   Cardiovascular:      Rate and Rhythm: Normal rate and regular rhythm.      Pulses: Normal pulses.      Heart sounds: Normal heart sounds. No murmur heard.    No friction rub. No gallop.   Pulmonary:      Effort: Pulmonary effort is normal.      Breath sounds: Normal breath sounds.   Abdominal:      General: Bowel sounds are normal. There is no distension.      Palpations: Abdomen is soft.      Tenderness: There is no abdominal tenderness.      Hernia: A hernia (large rt inguinal hernia, not reducible) is present.   Musculoskeletal:      Cervical back: Neck supple.   Skin:     General: Skin is warm and dry.   Neurological:      Mental Status: He is alert and oriented to person, place, and time.      Cranial Nerves: No cranial nerve deficit.   Psychiatric:         Judgment: Judgment normal.           Results for orders placed or performed in visit on 03/29/23   PSA DIAGNOSTIC    Specimen: Blood   Result Value Ref Range    PSA 6.720 (H) 0.000 - 4.000 ng/mL   CBC (No Diff)    Specimen: Blood   Result Value Ref Range    WBC 5.84 3.40 - 10.80 10*3/mm3    RBC 3.89 (L) 4.14 - 5.80 10*6/mm3    Hemoglobin 11.4 (L) 13.0 - 17.7 g/dL    Hematocrit 35.2 (L) 37.5 - 51.0 %    MCV 90.5 79.0 - 97.0 fL    MCH 29.3 26.6 - 33.0 pg    MCHC 32.4 31.5 - 35.7 g/dL    " RDW 15.0 12.3 - 15.4 %    RDW-SD 49.7 37.0 - 54.0 fl    MPV 12.9 (H) 6.0 - 12.0 fL    Platelets 153 140 - 450 10*3/mm3   Comprehensive Metabolic Panel    Specimen: Blood   Result Value Ref Range    Glucose 167 (H) 65 - 99 mg/dL    BUN 53 (H) 8 - 23 mg/dL    Creatinine 1.87 (H) 0.76 - 1.27 mg/dL    Sodium 142 136 - 145 mmol/L    Potassium 4.8 3.5 - 5.2 mmol/L    Chloride 108 (H) 98 - 107 mmol/L    CO2 23.0 22.0 - 29.0 mmol/L    Calcium 9.1 8.6 - 10.5 mg/dL    Total Protein 6.8 6.0 - 8.5 g/dL    Albumin 4.1 3.5 - 5.2 g/dL    ALT (SGPT) 16 1 - 41 U/L    AST (SGOT) 11 1 - 40 U/L    Alkaline Phosphatase 85 39 - 117 U/L    Total Bilirubin 0.3 0.0 - 1.2 mg/dL    Globulin 2.7 gm/dL    A/G Ratio 1.5 g/dL    BUN/Creatinine Ratio 28.3 (H) 7.0 - 25.0    Anion Gap 11.0 5.0 - 15.0 mmol/L    eGFR 36.3 (L) >60.0 mL/min/1.73   Lipid Panel    Specimen: Blood   Result Value Ref Range    Total Cholesterol 128 0 - 200 mg/dL    Triglycerides 64 0 - 150 mg/dL    HDL Cholesterol 52 40 - 60 mg/dL    LDL Cholesterol  63 0 - 100 mg/dL    VLDL Cholesterol 13 5 - 40 mg/dL    LDL/HDL Ratio 1.22    TSH    Specimen: Blood   Result Value Ref Range    TSH 3.780 0.270 - 4.200 uIU/mL             Assessment & Plan   Diagnoses and all orders for this visit:    Irreducible inguinal hernia  -     Ambulatory Referral to General Surgery    Chronic idiopathic constipation  -     linaclotide (Linzess) 145 MCG capsule capsule; Take 1 capsule by mouth Every Morning Before Breakfast. Start qod    Other orders  -     losartan (COZAAR) 100 MG tablet; Take 1 tablet by mouth Daily.    unsure if constipation related to hernia. Will start linzess.   Refer to GS , ASAP.             Return in about 5 months (around 10/16/2023) for Medicare Wellness- pls cancel 5/23 appt and schedule wellness in Oct- ok for 2 x 15 min.    Electronically signed by:    Marysol Shrestha MD     Transcribed from ambient dictation for Marysol Shrestha MD by Cuauhtemoc Franz  .  05/16/23   13:45 EDT    Patient or patient representative verbalized consent to the visit recording.  I have personally performed the services described in this document as transcribed by the above individual, and it is both accurate and complete.  Marysol Shrestha MD  5/18/2023  23:28 EDT

## 2023-05-24 ENCOUNTER — OFFICE VISIT (OUTPATIENT)
Dept: CARDIAC SURGERY | Facility: CLINIC | Age: 78
End: 2023-05-24
Payer: MEDICARE

## 2023-05-24 VITALS
SYSTOLIC BLOOD PRESSURE: 108 MMHG | DIASTOLIC BLOOD PRESSURE: 64 MMHG | OXYGEN SATURATION: 99 % | BODY MASS INDEX: 23.25 KG/M2 | TEMPERATURE: 97.5 F | WEIGHT: 187 LBS | HEIGHT: 75 IN | HEART RATE: 58 BPM

## 2023-05-24 DIAGNOSIS — I73.9 PERIPHERAL VASCULAR DISEASE: ICD-10-CM

## 2023-05-24 DIAGNOSIS — Z89.422 S/P AMPUTATION OF LESSER TOE, LEFT: Primary | ICD-10-CM

## 2023-05-24 PROBLEM — R79.89 ELEVATED SERUM CREATININE: Status: RESOLVED | Noted: 2022-11-15 | Resolved: 2023-05-24

## 2023-05-24 PROBLEM — R79.89 ELEVATED TROPONIN: Status: RESOLVED | Noted: 2022-10-02 | Resolved: 2023-05-24

## 2023-05-24 PROBLEM — J90 PLEURAL EFFUSION, BILATERAL: Status: RESOLVED | Noted: 2022-11-15 | Resolved: 2023-05-24

## 2023-05-24 PROBLEM — R77.8 ELEVATED TROPONIN: Status: RESOLVED | Noted: 2022-10-02 | Resolved: 2023-05-24

## 2023-05-24 PROBLEM — I25.10 CORONARY ARTERY DISEASE: Status: RESOLVED | Noted: 2019-03-06 | Resolved: 2023-05-24

## 2023-05-24 PROBLEM — R53.1 GENERALIZED WEAKNESS: Status: RESOLVED | Noted: 2022-11-15 | Resolved: 2023-05-24

## 2023-05-24 PROBLEM — I20.0 UNSTABLE ANGINA: Status: RESOLVED | Noted: 2019-02-12 | Resolved: 2023-05-24

## 2023-05-24 NOTE — PROGRESS NOTES
[facility] Cardiothoracic Surgery Office Follow Up Note    Date of Encounter: 2023     Name: Jermaine Singh  : 1945     Referred By: No ref. provider found  PCP: Marysol Shrestha MD    Chief Complaint:    Chief Complaint   Patient presents with   • Follow-up     Wound check - s/p right 2nd toe amp 2023       Subjective      History of Present Illness:    It was nice to see Jermaine Singh in follow up.  He is a pleasant 78 y.o. male with PMH significant for hypertension, hyperlipidemia, coronary artery disease s/p CABG x3 by Dr. Au on 3/22/2019, chronic heart failure, DVT, type 2 diabetes mellitus, GERD, chronic kidney disease stage III, chronic anemia, peripheral vascular disease, diabetic foot ulcer, left great toe amputation in , diabetic neuropathy, and osteomyelitis of the left second toe with exposure/ulceration of the middle phalangeal joint space.      Patient is s/p left second toe amputation with primary closure by Dr. Márquez on 3/17/2023.  See op report for full details.  He did well post-operatively.     Mr. Singh was last seen in office on 3/8/2023 by Dr. Márquez for second postop follow-up.  Upon examination the left second toe amputation site was healing well but still with some eschar formation.  Plan was made to follow-up in 2 months.  Patient presents to office today for her third postop follow-up at approximately 18 weeks.  Patient denies fever or chills.  Reports wound care compliance.  Without any complaints or concerns.     Review of Systems:  Review of Systems   Constitutional: Positive for decreased appetite and weight loss. Negative for chills, diaphoresis, fever, malaise/fatigue, night sweats and weight gain.   HENT: Negative.  Negative for hoarse voice.    Eyes: Negative.  Negative for blurred vision, double vision and visual disturbance.   Cardiovascular: Negative.  Negative for chest pain, claudication, dyspnea on exertion, irregular heartbeat, leg  swelling, near-syncope, orthopnea, palpitations, paroxysmal nocturnal dyspnea and syncope.   Respiratory: Negative.  Negative for cough, hemoptysis, shortness of breath, sputum production and wheezing.    Hematologic/Lymphatic: Negative for adenopathy and bleeding problem. Does not bruise/bleed easily.   Skin:  Negative for color change, nail changes, poor wound healing and rash.   Musculoskeletal:  Positive for arthritis (bilateral knees ). Negative for back pain, falls and muscle cramps.   Gastrointestinal: Negative.  Negative for abdominal pain, dysphagia and heartburn.   Genitourinary: Negative.  Negative for flank pain.   Neurological: Negative.  Negative for brief paralysis, disturbances in coordination, dizziness, focal weakness, headaches, light-headedness, loss of balance, numbness, paresthesias, sensory change, vertigo and weakness.   Psychiatric/Behavioral:  Negative for depression and suicidal ideas.    Allergic/Immunologic: Negative for persistent infections.       I have reviewed the following portions of the patient's history: problem list, current medications, allergies, past surgical history, past medical history, past social history, and past family history and confirm it's accurate.    Allergies:  Allergies   Allergen Reactions   • Vancomycin Other (See Comments)     Kidney failure per last admission       Medications:      Current Outpatient Medications:   •  acetaminophen (TYLENOL) 325 MG tablet, Take 2 tablets by mouth Every 6 (Six) Hours As Needed for Mild Pain., Disp: , Rfl:   •  apixaban (ELIQUIS) 5 MG tablet tablet, Take 1 tablet by mouth Every 12 (Twelve) Hours. Indications: DVT/PE (active thrombosis), Disp: 180 tablet, Rfl: 0  •  aspirin 81 MG chewable tablet, Chew 1 tablet Daily., Disp: 90 tablet, Rfl: 3  •  atorvastatin (LIPITOR) 20 MG tablet, Take 1 tablet by mouth Every Night., Disp: 90 tablet, Rfl: 3  •  bumetanide (BUMEX) 1 MG tablet, Take 1 tablet by mouth 2 (Two) Times a Day.  (Patient taking differently: Take 1 tablet by mouth 2 (Two) Times a Day. Qd-bid), Disp: 60 tablet, Rfl: 6  •  carvedilol (COREG) 3.125 MG tablet, Take 1 tablet by mouth 2 (Two) Times a Day With Meals., Disp: , Rfl:   •  Cholecalciferol (VITAMIN D3) 5000 units tablet tablet, Take 1 tablet by mouth Daily., Disp: , Rfl:   •  Diclofenac Sodium (VOLTAREN) 1 % gel gel, Apply 2 g topically to the appropriate area as directed 4 (Four) Times a Day As Needed (pain)., Disp: , Rfl:   •  famotidine (PEPCID) 20 MG tablet, Take 1 tablet by mouth Daily., Disp: 90 tablet, Rfl: 3  •  glucose blood (OneTouch Verio) test strip, 1 each by Other route 2 (Two) Times a Day. Use as instructed, Disp: 100 each, Rfl: 3  •  insulin detemir (LEVEMIR) 100 UNIT/ML injection, Inject 8 Units under the skin into the appropriate area as directed Every Night. (Patient taking differently: Inject 8 Units under the skin into the appropriate area as directed Daily.), Disp: , Rfl: 12  •  Insulin Lispro (humaLOG) 100 UNIT/ML injection, Inject 3 Units under the skin into the appropriate area as directed 3 (Three) Times a Day Before Meals. Under 150 3 unit 150-200 4 units 200-250- 5 unit, Disp: , Rfl:   •  levothyroxine (SYNTHROID, LEVOTHROID) 88 MCG tablet, Take 1 tablet by mouth Every Morning., Disp: 90 tablet, Rfl: 3  •  linaclotide (Linzess) 145 MCG capsule capsule, Take 1 capsule by mouth Every Morning Before Breakfast. Start qod, Disp: 12 capsule, Rfl: 0  •  losartan (COZAAR) 100 MG tablet, Take 1 tablet by mouth Daily., Disp: , Rfl:   •  magnesium oxide (MAG-OX) 400 MG tablet, Take 1 tablet by mouth Daily., Disp: 90 tablet, Rfl: 3  •  Misc. Devices (Rollator Ultra-Light) misc, 1 each Daily., Disp: 1 each, Rfl: 0  •  nitroglycerin (NITROSTAT) 0.4 MG SL tablet, Place 1 tablet under the tongue Every 5 (Five) Minutes As Needed for Chest Pain. Take no more than 3 doses in 15 minutes., Disp: 9 tablet, Rfl: 12  •  nystatin (MYCOSTATIN) 184845 UNIT/GM powder,  Apply  topically to the appropriate area as directed 2 (Two) Times a Day., Disp: 60 g, Rfl: 5  •  tamsulosin (FLOMAX) 0.4 MG capsule 24 hr capsule, Take 1 capsule by mouth Daily., Disp: 90 capsule, Rfl: 1  •  Xiidra 5 % ophthalmic solution, Administer 1 drop to both eyes 2 (Two) Times a Day., Disp: , Rfl:   No current facility-administered medications for this visit.    Facility-Administered Medications Ordered in Other Visits:   •  Chlorhexidine Gluconate Cloth 2 % pads 1 application, 1 application, Topical, Q12H PRN, Mohan Arauz PA    History:   Past Medical History:   Diagnosis Date   • Allergic    • Arthritis    • Asthma    • Coronary artery disease    • Diabetes mellitus 2000    started on inuslin 12/2018; started on po meds in 2000; checking blood sugars daily    • Disease of thyroid gland     po meds daily for hypothyroidism    • History of fracture as a child     rt leg- severe    • Hyperlipidemia    • Hypertension    • Hypothyroidism    • Peripheral neuropathy    • Peripheral vascular disease     s/p angiogram 2/19-needs stent in left leg    • Proximal phalanx fracture of the second digit extending into the second metatarsal joint 7/28/2022   • RBBB    • Right knee pain    • Vitamin D deficiency        Past Surgical History:   Procedure Laterality Date   • AMPUTATION DIGIT Left 01/17/2023    Procedure: AMPUTATION DIGIT LEFT;  Surgeon: Gopal Márquez MD;  Location: [facility] OR;  Service: Vascular;  Laterality: Left;   • APPENDECTOMY     • CARDIAC CATHETERIZATION N/A 02/14/2019    Procedure: Left Heart Cath;  Surgeon: Cooper Apodaca MD;  Location:  [facility] CATH INVASIVE LOCATION;  Service: Cardiology   • CARDIAC ELECTROPHYSIOLOGY PROCEDURE N/A 05/18/2022    Procedure: DEVICE IMPLANT;  Surgeon: Cooper Apodaca MD;  Location:  [facility] CATH INVASIVE LOCATION;  Service: Cardiology;  Laterality: N/A;   • CARDIAC SURGERY     • COLONOSCOPY     • CORONARY ARTERY BYPASS GRAFT N/A 03/22/2019     "Procedure: MEDIAN STERNOTOMY, CORONARY ARTERY BYPASS GRAFT X3, UTILIZING THE LEFT INTERNAL MAMMARY ARTERY, EVH AND OPEN HARVEST OF THE RIGHT GREATER SAPHENOUS VEIN, EXPLORATION OF THE LEFT LEG;  Surgeon: Ap Au MD;  [facility];  Service: Cardiothoracic   • EYE SURGERY Bilateral     cataracts    • FRACTURE SURGERY     • INTERVENTIONAL RADIOLOGY PROCEDURE N/A 07/29/2021    Procedure: Abdominal Aortagram with Runoff;  Surgeon: Jermaine Hernandez MD;  [facility];  Service: Cardiovascular;  Laterality: N/A;   • KNEE ARTHROSCOPY      right x 2, left x 1   • LACERATION REPAIR      right leg   • LEG SURGERY      2 for fracture of rt leg    • TONSILLECTOMY      Adnoidectomy   • TRANS METATARSAL AMPUTATION Left 08/02/2022    Procedure: GREAT TOE AMPUTATION LEFT;  Surgeon: Gopal Márquez MD;  [facility];  Service: Vascular;  Laterality: Left;       Social History     Socioeconomic History   • Marital status:    • Number of children: 2   Tobacco Use   • Smoking status: Never     Passive exposure: Past   • Smokeless tobacco: Never   Vaping Use   • Vaping Use: Never used   Substance and Sexual Activity   • Alcohol use: Not Currently     Comment: every 2-3 months   • Drug use: No   • Sexual activity: Defer        Family History   Problem Relation Age of Onset   • Coronary artery disease Mother    • Diabetes Mother    • Hyperlipidemia Mother    • Cancer Father        Objective     Physical Exam:  Vitals:    05/24/23 1115   BP: 108/64   BP Location: Right arm   Patient Position: Sitting   Pulse: 58   Temp: 97.5 °F (36.4 °C)   SpO2: 99%   Weight: 84.8 kg (187 lb)  Comment: pt reported   Height: 190.6 cm (75.04\")  Comment: pt reported      Body mass index is 23.35 kg/m².    Physical Exam  Vitals reviewed.   Constitutional:       Appearance: Normal appearance.   Pulmonary:      Effort: Pulmonary effort is normal.   Skin:     General: Skin is warm and dry. "   Neurological:      General: No focal deficit present.      Mental Status: He is alert and oriented to person, place, and time.   Psychiatric:         Mood and Affect: Mood normal.         Behavior: Behavior normal.         Thought Content: Thought content normal.         Judgment: Judgment normal.       Imaging/Labs:         Assessment / Plan      Assessment / Plan:  PMH significant for hypertension, hyperlipidemia, coronary artery disease s/p CABG x3 by Dr. Au on 3/22/2019, chronic heart failure, DVT, type 2 diabetes mellitus, GERD, chronic kidney disease stage III, chronic anemia, peripheral vascular disease, diabetic foot ulcer, left great toe amputation in 2022, diabetic neuropathy, and osteomyelitis of the left second toe with exposure/ulceration of the middle phalangeal joint space.      1.  Osteomyelitis  S/p left second toe amputation with primary closure by Dr. Márquez on 3/17/2023 due to osteomyelitis of the left second toe with exposure/ulceration of the middle phalangeal joint space.  Last seen in office on 3/8/2023 by Dr. Márquez for second postop follow-up.  Upon examination the left second toe amputation site was healing well but still with some eschar formation.  Plan was made to follow-up in 2 months.  Presents to office today for third postop follow-up at approximately 18 weeks.  Denies fever or chills.  Reports wound care compliance.  Without any complaints or concerns.  Upon examination the amputation site has healed without any eschar formation.  There is some areas of dry skin.  Patient instructed to wash with antibacterial soap and water and thoroughly dry.  Refer to [facility] orthopedics for shoe filler.        Patient Education:  You may resume driving at 6 weeks from your surgery date.  Please plan to stop every 2 hours to ambulate if driving or flying long distances.  No lifting over 10 lbs for 12 weeks (3 months).  After this time you can slowly increase your weight as tolerated.  You  should not partake in any overhead activities or movements that involve twisting or jarring of your upper chest and torso such as (but not limited to) -- golfing, tennis, lawn mowing including zero turn mowers, use of lawn trimmers or edgers, shoveling, painting, vacuuming, mopping, sweeping, shooting a gun, etc.  Continue to keep your incisions clean and dry.  Use plain antibacterial soap (i.e. dial) and water to clean and pat dry.    Do no apply any lotions, creams, oils, powders, salves, or ointments directly to incisional sites.  Please watch for signs and symptoms of infection which may include but are not limited to: increased pain or tenderness around your incision, warmth at the site or fever, redness or red streaking, and foul smelling or appearing drainage.  Clear drainage and bloody drainage are not worrisome.  If your incision opens up please contact our office.    Instructions and post-operative restrictions verbally reviewed -- patient verbalized understanding.    Follow Up:   No follow-ups on file.   Or sooner for any further concerns or worsening sign and symptoms.  Patient encouraged to call the office with any questions or concerns.     Thank you for allowing me to participate in your care.  Best Regards,    DAVID Almeida  [facility] Cardiothoracic Surgery  05/24/23  11:21 EDT

## 2023-05-25 ENCOUNTER — TELEPHONE (OUTPATIENT)
Dept: INTERNAL MEDICINE | Facility: CLINIC | Age: 78
End: 2023-05-25
Payer: MEDICARE

## 2023-05-25 DIAGNOSIS — K59.04 CHRONIC IDIOPATHIC CONSTIPATION: ICD-10-CM

## 2023-05-25 NOTE — TELEPHONE ENCOUNTER
PT's wife gave us a call back and would like to speak with someone from clinical.   Best call back number.   [phone]

## 2023-05-25 NOTE — TELEPHONE ENCOUNTER
Caller: HIPOLITO DAWN    Relationship: Emergency Contact    Best call back number: [phone]    What is the best time to reach you: ANYTIME    Who are you requesting to speak with (clinical staff, provider,  specific staff member): CLINICAL STAFF    Do you know the name of the person who called: WIFE    What was the call regarding: PATIENTS WIFE STATES THAT THE PATIENT IS CONSTIPATED AGAIN AND SHE WOULD LIKE A CALL ABOUT THIS.    Do you require a callback: YES

## 2023-05-26 NOTE — TELEPHONE ENCOUNTER
Spoke with patient's wife and informed to start taking linzess daily. Advised to call back with no improvement. New rx sent to pharmacy.

## 2023-05-26 NOTE — TELEPHONE ENCOUNTER
PT Called, the linaclotide (Linzess) 145 MCG worked for about 5 days. Then he had trouble going again. PT was wondering if he should take it everyday or every other day.   Rx has not been called in yet the PT is just using samples.  Phone: [phone]

## 2023-05-26 NOTE — TELEPHONE ENCOUNTER
He is on it every other day, please have him increase it to every day.  If He is taking it daily already, to let us know and I can increase the dose.

## 2023-06-08 ENCOUNTER — TRANSCRIBE ORDERS (OUTPATIENT)
Dept: LAB | Facility: HOSPITAL | Age: 78
End: 2023-06-08
Payer: MEDICARE

## 2023-06-08 ENCOUNTER — LAB (OUTPATIENT)
Dept: LAB | Facility: HOSPITAL | Age: 78
End: 2023-06-08
Payer: MEDICARE

## 2023-06-08 DIAGNOSIS — R06.02 SHORTNESS OF BREATH: ICD-10-CM

## 2023-06-08 DIAGNOSIS — I50.22 CHRONIC SYSTOLIC HEART FAILURE: Primary | ICD-10-CM

## 2023-06-08 DIAGNOSIS — I50.22 CHRONIC SYSTOLIC HEART FAILURE: ICD-10-CM

## 2023-06-08 PROCEDURE — 36415 COLL VENOUS BLD VENIPUNCTURE: CPT

## 2023-06-08 PROCEDURE — 80048 BASIC METABOLIC PNL TOTAL CA: CPT

## 2023-06-08 PROCEDURE — 83880 ASSAY OF NATRIURETIC PEPTIDE: CPT

## 2023-06-09 LAB
ANION GAP SERPL CALCULATED.3IONS-SCNC: 14.3 MMOL/L (ref 5–15)
BUN SERPL-MCNC: 47 MG/DL (ref 8–23)
BUN/CREAT SERPL: 27.2 (ref 7–25)
CALCIUM SPEC-SCNC: 9.3 MG/DL (ref 8.6–10.5)
CHLORIDE SERPL-SCNC: 106 MMOL/L (ref 98–107)
CO2 SERPL-SCNC: 22.7 MMOL/L (ref 22–29)
CREAT SERPL-MCNC: 1.73 MG/DL (ref 0.76–1.27)
EGFRCR SERPLBLD CKD-EPI 2021: 39.9 ML/MIN/1.73
GLUCOSE SERPL-MCNC: 155 MG/DL (ref 65–99)
NT-PROBNP SERPL-MCNC: ABNORMAL PG/ML (ref 0–1800)
POTASSIUM SERPL-SCNC: 5.1 MMOL/L (ref 3.5–5.2)
SODIUM SERPL-SCNC: 143 MMOL/L (ref 136–145)

## 2023-07-23 ENCOUNTER — HOSPITAL ENCOUNTER (INPATIENT)
Facility: HOSPITAL | Age: 78
LOS: 3 days | Discharge: HOME | DRG: 638 | End: 2023-07-26
Attending: EMERGENCY MEDICINE | Admitting: INTERNAL MEDICINE
Payer: MEDICARE

## 2023-07-23 ENCOUNTER — APPOINTMENT (OUTPATIENT)
Dept: GENERAL RADIOLOGY | Facility: HOSPITAL | Age: 78
DRG: 638 | End: 2023-07-23
Payer: MEDICARE

## 2023-07-23 DIAGNOSIS — K21.9 GERD WITHOUT ESOPHAGITIS: ICD-10-CM

## 2023-07-23 DIAGNOSIS — I50.22 CHRONIC HFREF (HEART FAILURE WITH REDUCED EJECTION FRACTION): Chronic | ICD-10-CM

## 2023-07-23 DIAGNOSIS — Z95.1 S/P CABG X 3: Chronic | ICD-10-CM

## 2023-07-23 DIAGNOSIS — I70.213 ATHEROSCLEROSIS OF NATIVE ARTERY OF BOTH LOWER EXTREMITIES WITH INTERMITTENT CLAUDICATION: Chronic | ICD-10-CM

## 2023-07-23 DIAGNOSIS — L03.115 CELLULITIS OF RIGHT LOWER EXTREMITY: ICD-10-CM

## 2023-07-23 DIAGNOSIS — N18.32 STAGE 3B CHRONIC KIDNEY DISEASE: Chronic | ICD-10-CM

## 2023-07-23 DIAGNOSIS — E44.0 MODERATE MALNUTRITION: ICD-10-CM

## 2023-07-23 DIAGNOSIS — L03.116 CELLULITIS OF LEFT FOOT: Primary | ICD-10-CM

## 2023-07-23 DIAGNOSIS — I10 PRIMARY HYPERTENSION: Chronic | ICD-10-CM

## 2023-07-23 DIAGNOSIS — E11.59 TYPE 2 DIABETES MELLITUS WITH OTHER CIRCULATORY COMPLICATION, WITH LONG-TERM CURRENT USE OF INSULIN: Chronic | ICD-10-CM

## 2023-07-23 DIAGNOSIS — I49.5 SSS (SICK SINUS SYNDROME): Chronic | ICD-10-CM

## 2023-07-23 DIAGNOSIS — Z89.422 STATUS POST AMPUTATION OF TOE OF LEFT FOOT: Chronic | ICD-10-CM

## 2023-07-23 DIAGNOSIS — E03.9 HYPOTHYROIDISM (ACQUIRED): Chronic | ICD-10-CM

## 2023-07-23 DIAGNOSIS — I73.9 PVD (PERIPHERAL VASCULAR DISEASE): Chronic | ICD-10-CM

## 2023-07-23 DIAGNOSIS — L08.9 DIABETIC FOOT INFECTION: ICD-10-CM

## 2023-07-23 DIAGNOSIS — E78.2 MIXED HYPERLIPIDEMIA: Chronic | ICD-10-CM

## 2023-07-23 DIAGNOSIS — R07.9 CHEST PAIN, UNSPECIFIED TYPE: ICD-10-CM

## 2023-07-23 DIAGNOSIS — E55.9 VITAMIN D DEFICIENCY: ICD-10-CM

## 2023-07-23 DIAGNOSIS — Z79.4 TYPE 2 DIABETES MELLITUS WITH OTHER CIRCULATORY COMPLICATION, WITH LONG-TERM CURRENT USE OF INSULIN: Chronic | ICD-10-CM

## 2023-07-23 DIAGNOSIS — E11.628 DIABETIC FOOT INFECTION: ICD-10-CM

## 2023-07-23 DIAGNOSIS — D63.8 ANEMIA, CHRONIC DISEASE: ICD-10-CM

## 2023-07-23 DIAGNOSIS — Z89.412 STATUS POST AMPUTATION OF GREAT TOE, LEFT: ICD-10-CM

## 2023-07-23 DIAGNOSIS — I48.0 PAF (PAROXYSMAL ATRIAL FIBRILLATION): Chronic | ICD-10-CM

## 2023-07-23 DIAGNOSIS — I50.9 ACUTE ON CHRONIC CONGESTIVE HEART FAILURE, UNSPECIFIED HEART FAILURE TYPE: ICD-10-CM

## 2023-07-23 DIAGNOSIS — K40.90 RIGHT INGUINAL HERNIA: ICD-10-CM

## 2023-07-23 DIAGNOSIS — R77.8 ELEVATED TROPONIN: ICD-10-CM

## 2023-07-23 DIAGNOSIS — I45.10 RBBB: ICD-10-CM

## 2023-07-23 PROBLEM — E11.40 DIABETIC NEUROPATHY: Chronic | Status: ACTIVE | Noted: 2019-03-22

## 2023-07-23 PROBLEM — M86.172 ACUTE OSTEOMYELITIS OF LEFT FOOT: Status: ACTIVE | Noted: 2023-07-23

## 2023-07-23 LAB
ALBUMIN SERPL-MCNC: 3.4 G/DL (ref 3.5–5.2)
ALBUMIN/GLOB SERPL: 1.1 G/DL
ALP SERPL-CCNC: 114 U/L (ref 39–117)
ALT SERPL W P-5'-P-CCNC: 28 U/L (ref 1–41)
ANION GAP SERPL CALCULATED.3IONS-SCNC: 12 MMOL/L (ref 5–15)
AST SERPL-CCNC: 25 U/L (ref 1–40)
BASOPHILS # BLD AUTO: 0.05 10*3/MM3 (ref 0–0.2)
BASOPHILS NFR BLD AUTO: 0.5 % (ref 0–1.5)
BILIRUB SERPL-MCNC: 0.5 MG/DL (ref 0–1.2)
BUN SERPL-MCNC: 46 MG/DL (ref 8–23)
BUN/CREAT SERPL: 27.9 (ref 7–25)
CALCIUM SPEC-SCNC: 8.8 MG/DL (ref 8.6–10.5)
CHLORIDE SERPL-SCNC: 105 MMOL/L (ref 98–107)
CO2 SERPL-SCNC: 25 MMOL/L (ref 22–29)
CREAT SERPL-MCNC: 1.65 MG/DL (ref 0.76–1.27)
CRP SERPL-MCNC: 3.6 MG/DL (ref 0–0.5)
D-LACTATE SERPL-SCNC: 1.6 MMOL/L (ref 0.5–2)
DEPRECATED RDW RBC AUTO: 47.5 FL (ref 37–54)
EGFRCR SERPLBLD CKD-EPI 2021: 42.2 ML/MIN/1.73
EOSINOPHIL # BLD AUTO: 0.3 10*3/MM3 (ref 0–0.4)
EOSINOPHIL NFR BLD AUTO: 2.8 % (ref 0.3–6.2)
ERYTHROCYTE [DISTWIDTH] IN BLOOD BY AUTOMATED COUNT: 14.1 % (ref 12.3–15.4)
ERYTHROCYTE [SEDIMENTATION RATE] IN BLOOD: 30 MM/HR (ref 0–20)
GEN 5 2HR TROPONIN T REFLEX: 104 NG/L
GLOBULIN UR ELPH-MCNC: 3 GM/DL
GLUCOSE BLDC GLUCOMTR-MCNC: 285 MG/DL (ref 70–130)
GLUCOSE SERPL-MCNC: 250 MG/DL (ref 65–99)
HCT VFR BLD AUTO: 39 % (ref 37.5–51)
HGB BLD-MCNC: 12.2 G/DL (ref 13–17.7)
HOLD SPECIMEN: NORMAL
IMM GRANULOCYTES # BLD AUTO: 0.03 10*3/MM3 (ref 0–0.05)
IMM GRANULOCYTES NFR BLD AUTO: 0.3 % (ref 0–0.5)
LIPASE SERPL-CCNC: 11 U/L (ref 13–60)
LYMPHOCYTES # BLD AUTO: 0.97 10*3/MM3 (ref 0.7–3.1)
LYMPHOCYTES NFR BLD AUTO: 8.9 % (ref 19.6–45.3)
MCH RBC QN AUTO: 28.6 PG (ref 26.6–33)
MCHC RBC AUTO-ENTMCNC: 31.3 G/DL (ref 31.5–35.7)
MCV RBC AUTO: 91.5 FL (ref 79–97)
MONOCYTES # BLD AUTO: 0.86 10*3/MM3 (ref 0.1–0.9)
MONOCYTES NFR BLD AUTO: 7.9 % (ref 5–12)
NEUTROPHILS NFR BLD AUTO: 79.6 % (ref 42.7–76)
NEUTROPHILS NFR BLD AUTO: 8.67 10*3/MM3 (ref 1.7–7)
NRBC BLD AUTO-RTO: 0 /100 WBC (ref 0–0.2)
NT-PROBNP SERPL-MCNC: ABNORMAL PG/ML (ref 0–1800)
PLATELET # BLD AUTO: 179 10*3/MM3 (ref 140–450)
PMV BLD AUTO: 11.6 FL (ref 6–12)
POTASSIUM SERPL-SCNC: 4.8 MMOL/L (ref 3.5–5.2)
PROT SERPL-MCNC: 6.4 G/DL (ref 6–8.5)
QT INTERVAL: 464 MS
QTC INTERVAL: 589 MS
RBC # BLD AUTO: 4.26 10*6/MM3 (ref 4.14–5.8)
SODIUM SERPL-SCNC: 142 MMOL/L (ref 136–145)
TROPONIN T DELTA: 1 NG/L
TROPONIN T SERPL HS-MCNC: 103 NG/L
WBC NRBC COR # BLD: 10.88 10*3/MM3 (ref 3.4–10.8)
WHOLE BLOOD HOLD COAG: NORMAL
WHOLE BLOOD HOLD SPECIMEN: NORMAL

## 2023-07-23 PROCEDURE — 99285 EMERGENCY DEPT VISIT HI MDM: CPT

## 2023-07-23 PROCEDURE — 87040 BLOOD CULTURE FOR BACTERIA: CPT | Performed by: EMERGENCY MEDICINE

## 2023-07-23 PROCEDURE — 85025 COMPLETE CBC W/AUTO DIFF WBC: CPT | Performed by: EMERGENCY MEDICINE

## 2023-07-23 PROCEDURE — 73630 X-RAY EXAM OF FOOT: CPT

## 2023-07-23 PROCEDURE — 80053 COMPREHEN METABOLIC PANEL: CPT | Performed by: EMERGENCY MEDICINE

## 2023-07-23 PROCEDURE — 85652 RBC SED RATE AUTOMATED: CPT | Performed by: EMERGENCY MEDICINE

## 2023-07-23 PROCEDURE — 25010000002 CEFTRIAXONE PER 250 MG: Performed by: EMERGENCY MEDICINE

## 2023-07-23 PROCEDURE — 86140 C-REACTIVE PROTEIN: CPT | Performed by: EMERGENCY MEDICINE

## 2023-07-23 PROCEDURE — 83605 ASSAY OF LACTIC ACID: CPT | Performed by: EMERGENCY MEDICINE

## 2023-07-23 PROCEDURE — 83036 HEMOGLOBIN GLYCOSYLATED A1C: CPT | Performed by: FAMILY MEDICINE

## 2023-07-23 PROCEDURE — 93005 ELECTROCARDIOGRAM TRACING: CPT | Performed by: EMERGENCY MEDICINE

## 2023-07-23 PROCEDURE — 83690 ASSAY OF LIPASE: CPT | Performed by: EMERGENCY MEDICINE

## 2023-07-23 PROCEDURE — 36415 COLL VENOUS BLD VENIPUNCTURE: CPT

## 2023-07-23 PROCEDURE — 25010000002 LINEZOLID 600 MG/300ML SOLUTION: Performed by: EMERGENCY MEDICINE

## 2023-07-23 PROCEDURE — 83880 ASSAY OF NATRIURETIC PEPTIDE: CPT | Performed by: EMERGENCY MEDICINE

## 2023-07-23 PROCEDURE — 71045 X-RAY EXAM CHEST 1 VIEW: CPT

## 2023-07-23 PROCEDURE — 84484 ASSAY OF TROPONIN QUANT: CPT | Performed by: EMERGENCY MEDICINE

## 2023-07-23 PROCEDURE — 82948 REAGENT STRIP/BLOOD GLUCOSE: CPT

## 2023-07-23 RX ORDER — BISACODYL 5 MG/1
5 TABLET, DELAYED RELEASE ORAL DAILY PRN
Status: DISCONTINUED | OUTPATIENT
Start: 2023-07-23 | End: 2023-07-26 | Stop reason: HOSPADM

## 2023-07-23 RX ORDER — POLYETHYLENE GLYCOL 3350 17 G/17G
17 POWDER, FOR SOLUTION ORAL DAILY PRN
Status: DISCONTINUED | OUTPATIENT
Start: 2023-07-23 | End: 2023-07-26 | Stop reason: HOSPADM

## 2023-07-23 RX ORDER — NALOXONE HCL 0.4 MG/ML
0.4 VIAL (ML) INJECTION
Status: DISCONTINUED | OUTPATIENT
Start: 2023-07-23 | End: 2023-07-26 | Stop reason: HOSPADM

## 2023-07-23 RX ORDER — ACETAMINOPHEN 325 MG/1
650 TABLET ORAL EVERY 4 HOURS PRN
Status: DISCONTINUED | OUTPATIENT
Start: 2023-07-23 | End: 2023-07-26 | Stop reason: HOSPADM

## 2023-07-23 RX ORDER — TAMSULOSIN HYDROCHLORIDE 0.4 MG/1
0.4 CAPSULE ORAL DAILY
Status: DISCONTINUED | OUTPATIENT
Start: 2023-07-24 | End: 2023-07-26 | Stop reason: HOSPADM

## 2023-07-23 RX ORDER — SODIUM CHLORIDE 9 MG/ML
40 INJECTION, SOLUTION INTRAVENOUS AS NEEDED
Status: DISCONTINUED | OUTPATIENT
Start: 2023-07-23 | End: 2023-07-26 | Stop reason: HOSPADM

## 2023-07-23 RX ORDER — BISACODYL 10 MG
10 SUPPOSITORY, RECTAL RECTAL DAILY PRN
Status: DISCONTINUED | OUTPATIENT
Start: 2023-07-23 | End: 2023-07-26 | Stop reason: HOSPADM

## 2023-07-23 RX ORDER — QUETIAPINE FUMARATE 25 MG/1
12.5 TABLET, FILM COATED ORAL NIGHTLY PRN
Status: DISCONTINUED | OUTPATIENT
Start: 2023-07-23 | End: 2023-07-26 | Stop reason: HOSPADM

## 2023-07-23 RX ORDER — NICOTINE POLACRILEX 4 MG
15 LOZENGE BUCCAL
Status: DISCONTINUED | OUTPATIENT
Start: 2023-07-23 | End: 2023-07-26 | Stop reason: HOSPADM

## 2023-07-23 RX ORDER — BUMETANIDE 1 MG/1
1 TABLET ORAL 2 TIMES DAILY
Status: DISCONTINUED | OUTPATIENT
Start: 2023-07-23 | End: 2023-07-26 | Stop reason: HOSPADM

## 2023-07-23 RX ORDER — LINEZOLID 2 MG/ML
600 INJECTION, SOLUTION INTRAVENOUS ONCE
Status: COMPLETED | OUTPATIENT
Start: 2023-07-23 | End: 2023-07-23

## 2023-07-23 RX ORDER — FAMOTIDINE 20 MG/1
20 TABLET, FILM COATED ORAL DAILY
Status: DISCONTINUED | OUTPATIENT
Start: 2023-07-24 | End: 2023-07-26 | Stop reason: HOSPADM

## 2023-07-23 RX ORDER — LINEZOLID 2 MG/ML
600 INJECTION, SOLUTION INTRAVENOUS EVERY 12 HOURS
Status: DISCONTINUED | OUTPATIENT
Start: 2023-07-24 | End: 2023-07-25

## 2023-07-23 RX ORDER — ONDANSETRON 2 MG/ML
4 INJECTION INTRAMUSCULAR; INTRAVENOUS EVERY 6 HOURS PRN
Status: DISCONTINUED | OUTPATIENT
Start: 2023-07-23 | End: 2023-07-26 | Stop reason: HOSPADM

## 2023-07-23 RX ORDER — SODIUM CHLORIDE 0.9 % (FLUSH) 0.9 %
10 SYRINGE (ML) INJECTION EVERY 12 HOURS SCHEDULED
Status: DISCONTINUED | OUTPATIENT
Start: 2023-07-23 | End: 2023-07-26 | Stop reason: HOSPADM

## 2023-07-23 RX ORDER — SODIUM CHLORIDE 0.9 % (FLUSH) 0.9 %
10 SYRINGE (ML) INJECTION AS NEEDED
Status: DISCONTINUED | OUTPATIENT
Start: 2023-07-23 | End: 2023-07-26 | Stop reason: HOSPADM

## 2023-07-23 RX ORDER — DEXTROSE MONOHYDRATE 25 G/50ML
25 INJECTION, SOLUTION INTRAVENOUS
Status: DISCONTINUED | OUTPATIENT
Start: 2023-07-23 | End: 2023-07-26 | Stop reason: HOSPADM

## 2023-07-23 RX ORDER — IBUPROFEN 600 MG/1
1 TABLET ORAL
Status: DISCONTINUED | OUTPATIENT
Start: 2023-07-23 | End: 2023-07-26 | Stop reason: HOSPADM

## 2023-07-23 RX ORDER — INSULIN LISPRO 100 [IU]/ML
2-9 INJECTION, SOLUTION INTRAVENOUS; SUBCUTANEOUS
Status: DISCONTINUED | OUTPATIENT
Start: 2023-07-23 | End: 2023-07-26 | Stop reason: HOSPADM

## 2023-07-23 RX ORDER — AMOXICILLIN 250 MG
2 CAPSULE ORAL 2 TIMES DAILY
Status: DISCONTINUED | OUTPATIENT
Start: 2023-07-23 | End: 2023-07-26 | Stop reason: HOSPADM

## 2023-07-23 RX ORDER — CARVEDILOL 3.12 MG/1
3.12 TABLET ORAL 2 TIMES DAILY WITH MEALS
Status: DISCONTINUED | OUTPATIENT
Start: 2023-07-23 | End: 2023-07-26 | Stop reason: HOSPADM

## 2023-07-23 RX ORDER — NYSTATIN 100000 [USP'U]/G
POWDER TOPICAL 2 TIMES DAILY
Status: DISCONTINUED | OUTPATIENT
Start: 2023-07-23 | End: 2023-07-26 | Stop reason: HOSPADM

## 2023-07-23 RX ORDER — MORPHINE SULFATE 2 MG/ML
1 INJECTION, SOLUTION INTRAMUSCULAR; INTRAVENOUS EVERY 4 HOURS PRN
Status: DISCONTINUED | OUTPATIENT
Start: 2023-07-23 | End: 2023-07-26 | Stop reason: HOSPADM

## 2023-07-23 RX ORDER — LOSARTAN POTASSIUM 50 MG/1
100 TABLET ORAL DAILY
Status: DISCONTINUED | OUTPATIENT
Start: 2023-07-24 | End: 2023-07-26 | Stop reason: HOSPADM

## 2023-07-23 RX ORDER — ATORVASTATIN CALCIUM 20 MG/1
20 TABLET, FILM COATED ORAL NIGHTLY
Status: DISCONTINUED | OUTPATIENT
Start: 2023-07-23 | End: 2023-07-26 | Stop reason: HOSPADM

## 2023-07-23 RX ORDER — ASPIRIN 81 MG/1
81 TABLET, CHEWABLE ORAL DAILY
Status: DISCONTINUED | OUTPATIENT
Start: 2023-07-24 | End: 2023-07-26 | Stop reason: HOSPADM

## 2023-07-23 RX ORDER — HYDROCODONE BITARTRATE AND ACETAMINOPHEN 5; 325 MG/1; MG/1
1 TABLET ORAL EVERY 4 HOURS PRN
Status: DISCONTINUED | OUTPATIENT
Start: 2023-07-23 | End: 2023-07-26 | Stop reason: HOSPADM

## 2023-07-23 RX ORDER — LEVOTHYROXINE SODIUM 88 UG/1
88 TABLET ORAL
Status: DISCONTINUED | OUTPATIENT
Start: 2023-07-24 | End: 2023-07-26 | Stop reason: HOSPADM

## 2023-07-23 RX ORDER — ONDANSETRON 4 MG/1
4 TABLET, FILM COATED ORAL EVERY 6 HOURS PRN
Status: DISCONTINUED | OUTPATIENT
Start: 2023-07-23 | End: 2023-07-26 | Stop reason: HOSPADM

## 2023-07-23 RX ORDER — ASPIRIN 81 MG/1
324 TABLET, CHEWABLE ORAL ONCE
Status: COMPLETED | OUTPATIENT
Start: 2023-07-23 | End: 2023-07-23

## 2023-07-23 RX ORDER — CALCIUM CARBONATE 500 MG/1
2 TABLET, CHEWABLE ORAL 2 TIMES DAILY PRN
Status: DISCONTINUED | OUTPATIENT
Start: 2023-07-23 | End: 2023-07-26 | Stop reason: HOSPADM

## 2023-07-23 RX ORDER — NITROGLYCERIN 0.4 MG/1
0.4 TABLET SUBLINGUAL
Status: DISCONTINUED | OUTPATIENT
Start: 2023-07-23 | End: 2023-07-26 | Stop reason: HOSPADM

## 2023-07-23 RX ADMIN — Medication 10 ML: at 23:03

## 2023-07-23 RX ADMIN — LINEZOLID 600 MG: 600 INJECTION, SOLUTION INTRAVENOUS at 22:01

## 2023-07-23 RX ADMIN — ATORVASTATIN CALCIUM 20 MG: 20 TABLET, FILM COATED ORAL at 23:01

## 2023-07-23 RX ADMIN — NYSTATIN: 100000 POWDER TOPICAL at 22:56

## 2023-07-23 RX ADMIN — ASPIRIN 243 MG: 81 TABLET, CHEWABLE ORAL at 19:54

## 2023-07-23 RX ADMIN — SODIUM CHLORIDE 2000 MG: 900 INJECTION INTRAVENOUS at 22:12

## 2023-07-23 RX ADMIN — CARVEDILOL 3.12 MG: 3.12 TABLET, FILM COATED ORAL at 23:00

## 2023-07-23 NOTE — Clinical Note
Level of Care: Telemetry [5]   Diagnosis: Acute osteomyelitis of left foot [502238]   Admitting Physician: BRIDGETTE MULLER [[id number]]   Certification: I Certify That Inpatient Hospital Services Are Medically Necessary For Greater Than 2 Midnights

## 2023-07-23 NOTE — ED PROVIDER NOTES
Subjective   History of Present Illness  78-year-old male who presents for evaluation of swollen red left foot.  The patient reports that he just noticed a blister over his left foot over the medial aspect of the left first metacarpal phalangeal joint.  In association with that he has developed redness that spreads throughout the distal two thirds of the left foot.  The patient has a known history of diabetes and is a sensate in his feet secondary to peripheral neuropathy.  He reports noticing increased welling, redness, and mild drainage of blood from a blistered wound within the last day.  He denies fever, bodies, or chills.  He previously has had the left first and second toe amputated by Dr. Oro within the last year.  He reports that the left great toe was amputated in August of last year, and the left second toe was amputated in January or February of this year.  He does also report some mild chest pain.  It is not persistent.  No radiation to the neck, shoulders, back, or arms.  No abdominal pain, no nausea or vomiting, no change in bowel or urinary function.  He did not take any medication prior to arrival.    Review of Systems   Constitutional:  Negative for chills, fatigue and fever.   HENT:  Negative for congestion, ear pain, postnasal drip, sinus pressure and sore throat.    Eyes:  Negative for pain, redness and visual disturbance.   Respiratory:  Negative for cough, chest tightness and shortness of breath.    Cardiovascular:  Positive for chest pain. Negative for palpitations and leg swelling.   Gastrointestinal:  Negative for abdominal pain, anal bleeding, blood in stool, diarrhea, nausea and vomiting.   Endocrine: Negative for polydipsia and polyuria.   Genitourinary:  Negative for difficulty urinating, dysuria, frequency and urgency.   Musculoskeletal:  Negative for arthralgias, back pain and neck pain.   Skin:  Positive for color change and wound. Negative for pallor and rash.    Allergic/Immunologic: Negative for environmental allergies and immunocompromised state.   Neurological:  Negative for dizziness, weakness and headaches.   Hematological:  Negative for adenopathy.   Psychiatric/Behavioral:  Negative for confusion, self-injury and suicidal ideas. The patient is not nervous/anxious.    All other systems reviewed and are negative.    Past Medical History:   Diagnosis Date    Allergic     Arthritis     Asthma     Coronary artery disease     Diabetes mellitus 2000    started on inuslin 12/2018; started on po meds in 2000; checking blood sugars daily     Disease of thyroid gland     po meds daily for hypothyroidism     Elevated serum creatinine 11/15/2022    Elevated troponin 10/02/2022    Generalized weakness 11/15/2022    History of fracture as a child     rt leg- severe     Hyperlipidemia     Hypertension     Hypothyroidism     PAF (paroxysmal atrial fibrillation) 07/23/2023    Peripheral neuropathy     Peripheral vascular disease     s/p angiogram 2/19-needs stent in left leg     Pleural effusion, bilateral 11/15/2022    Proximal phalanx fracture of the second digit extending into the second metatarsal joint 07/28/2022    RBBB     Right knee pain     Unstable angina 02/12/2019    Added automatically from request for surgery [id number]    Vitamin D deficiency        Allergies   Allergen Reactions    Vancomycin Other (See Comments)     Kidney failure per last admission       Past Surgical History:   Procedure Laterality Date    AMPUTATION DIGIT Left 01/17/2023    Procedure: AMPUTATION DIGIT LEFT;  Surgeon: Gopal Márquez MD;  Location: [facility] OR;  Service: Vascular;  Laterality: Left;    APPENDECTOMY      CARDIAC CATHETERIZATION N/A 02/14/2019    Procedure: Left Heart Cath;  Surgeon: Cooper Apodaca MD;  Location:  [facility];  Service: Cardiology    CARDIAC ELECTROPHYSIOLOGY PROCEDURE N/A 05/18/2022    Procedure: DEVICE IMPLANT;  Surgeon: Cooper Apodaca  MD;  Location:  [facility];  Service: Cardiology;  Laterality: N/A;    CARDIAC SURGERY      COLONOSCOPY      CORONARY ARTERY BYPASS GRAFT N/A 03/22/2019    Procedure: MEDIAN STERNOTOMY, CORONARY ARTERY BYPASS GRAFT X3, UTILIZING THE LEFT INTERNAL MAMMARY ARTERY, EVH AND OPEN HARVEST OF THE RIGHT GREATER SAPHENOUS VEIN, EXPLORATION OF THE LEFT LEG;  Surgeon: Ap Au MD;  Location:  [facility] OR;  Service: Cardiothoracic    EYE SURGERY Bilateral     cataracts     FRACTURE SURGERY      INTERVENTIONAL RADIOLOGY PROCEDURE N/A 07/29/2021    Procedure: Abdominal Aortagram with Runoff;  Surgeon: Jermaine Hernandez MD;  Location:  [facility];  Service: Cardiovascular;  Laterality: N/A;    KNEE ARTHROSCOPY      right x 2, left x 1    LACERATION REPAIR      right leg    LEG SURGERY      2 for fracture of rt leg     TONSILLECTOMY      Adnoidectomy    TRANS METATARSAL AMPUTATION Left 08/02/2022    Procedure: GREAT TOE AMPUTATION LEFT;  Surgeon: Gopal Márquez MD;  Location:  [facility] OR;  Service: Vascular;  Laterality: Left;       Family History   Problem Relation Age of Onset    Coronary artery disease Mother     Diabetes Mother     Hyperlipidemia Mother     Cancer Father        Social History     Socioeconomic History    Marital status:     Number of children: 2   Tobacco Use    Smoking status: Never     Passive exposure: Past    Smokeless tobacco: Never   Vaping Use    Vaping Use: Never used   Substance and Sexual Activity    Alcohol use: Not Currently     Comment: every 2-3 months    Drug use: No    Sexual activity: Defer           Objective   Physical Exam  Vitals and nursing note reviewed.   Constitutional:       General: He is not in acute distress.     Appearance: Normal appearance. He is well-developed. He is not toxic-appearing or diaphoretic.   HENT:      Head: Normocephalic and atraumatic.      Right Ear: External ear normal.      Left Ear: External ear normal.       Nose: Nose normal.   Eyes:      General: Lids are normal.      Pupils: Pupils are equal, round, and reactive to light.   Neck:      Trachea: No tracheal deviation.   Cardiovascular:      Rate and Rhythm: Normal rate and regular rhythm.      Pulses: No decreased pulses.      Heart sounds: Normal heart sounds. No murmur heard.    No friction rub. No gallop.   Pulmonary:      Effort: Pulmonary effort is normal. No respiratory distress.      Breath sounds: Normal breath sounds. No decreased breath sounds, wheezing, rhonchi or rales.   Abdominal:      General: Bowel sounds are normal.      Palpations: Abdomen is soft.      Tenderness: There is no abdominal tenderness. There is no guarding or rebound.   Musculoskeletal:         General: No deformity. Normal range of motion.      Cervical back: Normal range of motion and neck supple.   Lymphadenopathy:      Cervical: No cervical adenopathy.   Skin:     General: Skin is warm and dry.      Findings: Erythema and wound present. No rash.      Comments: Left foot is erythematous, mild drainage of serosanguineous fluid.  No purulent drainage.   Neurological:      Mental Status: He is alert and oriented to person, place, and time.      Cranial Nerves: No cranial nerve deficit.      Sensory: No sensory deficit.   Psychiatric:         Speech: Speech normal.         Behavior: Behavior normal.         Thought Content: Thought content normal.         Judgment: Judgment normal.       Procedures           ED Course                                           Medical Decision Making  Differential diagnosis includes cellulitis, osteomyelitis, acute coronary syndrome, chest wall pain, other unspecified etiology.    Lab evaluation shows elevated white blood cell count, elevated sed rate,, normal lactic acid level.    Creatinine is elevated consistent with baseline.  The patient also has hyperglycemia consistent with known history of diabetes.    BNP is elevated to 33,000, greater than  previous baseline, concerning for CHF exacerbation.    Troponin is also elevated to 103.    Chest x-ray shows cardiomegaly.    X-ray of the left foot shows signs of previous amputation but no acute signs of osteomyelitis.    The patient's clinical presentation is concerning for cellulitis versus osteomyelitis in a person who is a diabetic with poor wound healing.    The patient also has elevated heart enzymes and BNP.    The patient has a previous history of MRSA and has a vancomycin allergy.  Previously he was given linezolid therefore Rocephin and linezolid have been ordered.    I discussed the patient with the hospitalist, who will consult on the patient to determine status of admission.    Problems Addressed:  Acute on chronic congestive heart failure, unspecified heart failure type: complicated acute illness or injury  Cellulitis of left foot: complicated acute illness or injury  Chest pain, unspecified type: complicated acute illness or injury that poses a threat to life or bodily functions  Elevated troponin: complicated acute illness or injury    Amount and/or Complexity of Data Reviewed  Independent Historian: spouse     Details: Wife provides additional history.  External Data Reviewed: labs, radiology, ECG and notes.  Labs: ordered.  Radiology: ordered.  ECG/medicine tests: ordered and independent interpretation performed. Decision-making details documented in ED Course.     Details: EKG independently interpreted by myself shows a sensed V paced rhythm.    Risk  OTC drugs.  Prescription drug management.  Decision regarding hospitalization.        Final diagnoses:   Cellulitis of left foot   Chest pain, unspecified type   Acute on chronic congestive heart failure, unspecified heart failure type   Elevated troponin       ED Disposition  ED Disposition       ED Disposition   Decision to Admit    Condition   --    Comment   Level of Care: Telemetry [5]   Diagnosis: Acute osteomyelitis of left foot [770159]    Admitting Physician: BRIDGETTE MULLER [[id number]]                 No follow-up provider specified.       Medication List      No changes were made to your prescriptions during this visit.            Jayden Bryant MD  07/25/23 [id number]

## 2023-07-24 ENCOUNTER — TELEPHONE (OUTPATIENT)
Dept: INTERNAL MEDICINE | Facility: CLINIC | Age: 78
End: 2023-07-24
Payer: MEDICARE

## 2023-07-24 LAB
GLUCOSE BLDC GLUCOMTR-MCNC: 145 MG/DL (ref 70–130)
GLUCOSE BLDC GLUCOMTR-MCNC: 190 MG/DL (ref 70–130)
GLUCOSE BLDC GLUCOMTR-MCNC: 206 MG/DL (ref 70–130)
GLUCOSE BLDC GLUCOMTR-MCNC: 220 MG/DL (ref 70–130)
HBA1C MFR BLD: 8.7 % (ref 4.8–5.6)

## 2023-07-24 PROCEDURE — 63710000001 INSULIN DETEMIR PER 5 UNITS: Performed by: FAMILY MEDICINE

## 2023-07-24 PROCEDURE — 82948 REAGENT STRIP/BLOOD GLUCOSE: CPT

## 2023-07-24 PROCEDURE — 25010000002 LINEZOLID 600 MG/300ML SOLUTION: Performed by: FAMILY MEDICINE

## 2023-07-24 PROCEDURE — 63710000001 INSULIN LISPRO (HUMAN) PER 5 UNITS: Performed by: FAMILY MEDICINE

## 2023-07-24 PROCEDURE — 99222 1ST HOSP IP/OBS MODERATE 55: CPT | Performed by: THORACIC SURGERY (CARDIOTHORACIC VASCULAR SURGERY)

## 2023-07-24 PROCEDURE — 25010000002 ENOXAPARIN PER 10 MG: Performed by: INTERNAL MEDICINE

## 2023-07-24 PROCEDURE — 25010000002 CEFTRIAXONE PER 250 MG: Performed by: FAMILY MEDICINE

## 2023-07-24 RX ORDER — ENOXAPARIN SODIUM 100 MG/ML
1 INJECTION SUBCUTANEOUS EVERY 12 HOURS SCHEDULED
Status: DISCONTINUED | OUTPATIENT
Start: 2023-07-24 | End: 2023-07-25

## 2023-07-24 RX ADMIN — Medication 10 ML: at 21:18

## 2023-07-24 RX ADMIN — LINEZOLID 600 MG: 600 INJECTION, SOLUTION INTRAVENOUS at 21:18

## 2023-07-24 RX ADMIN — Medication 10 ML: at 08:34

## 2023-07-24 RX ADMIN — BUMETANIDE 1 MG: 1 TABLET ORAL at 08:42

## 2023-07-24 RX ADMIN — NYSTATIN: 100000 POWDER TOPICAL at 21:18

## 2023-07-24 RX ADMIN — NYSTATIN: 100000 POWDER TOPICAL at 08:29

## 2023-07-24 RX ADMIN — ATORVASTATIN CALCIUM 20 MG: 20 TABLET, FILM COATED ORAL at 21:17

## 2023-07-24 RX ADMIN — LEVOTHYROXINE SODIUM 88 MCG: 0.09 TABLET ORAL at 05:47

## 2023-07-24 RX ADMIN — ENOXAPARIN SODIUM 80 MG: 80 INJECTION SUBCUTANEOUS at 21:16

## 2023-07-24 RX ADMIN — INSULIN LISPRO 4 UNITS: 100 INJECTION, SOLUTION INTRAVENOUS; SUBCUTANEOUS at 21:17

## 2023-07-24 RX ADMIN — LINEZOLID 600 MG: 600 INJECTION, SOLUTION INTRAVENOUS at 08:33

## 2023-07-24 RX ADMIN — SENNOSIDES AND DOCUSATE SODIUM 2 TABLET: 50; 8.6 TABLET ORAL at 21:17

## 2023-07-24 RX ADMIN — POLYETHYLENE GLYCOL 3350 17 G: 17 POWDER, FOR SOLUTION ORAL at 08:29

## 2023-07-24 RX ADMIN — TAMSULOSIN HYDROCHLORIDE 0.4 MG: 0.4 CAPSULE ORAL at 08:29

## 2023-07-24 RX ADMIN — FAMOTIDINE 20 MG: 20 TABLET ORAL at 08:30

## 2023-07-24 RX ADMIN — INSULIN LISPRO 2 UNITS: 100 INJECTION, SOLUTION INTRAVENOUS; SUBCUTANEOUS at 08:29

## 2023-07-24 RX ADMIN — CARVEDILOL 3.12 MG: 3.12 TABLET, FILM COATED ORAL at 08:30

## 2023-07-24 RX ADMIN — SODIUM CHLORIDE 1000 MG: 900 INJECTION INTRAVENOUS at 11:27

## 2023-07-24 RX ADMIN — MAGNESIUM OXIDE TAB 400 MG (241.3 MG ELEMENTAL MG) 400 MG: 400 (241.3 MG) TAB at 08:30

## 2023-07-24 RX ADMIN — ASPIRIN 81 MG: 81 TABLET, CHEWABLE ORAL at 08:30

## 2023-07-24 RX ADMIN — LOSARTAN POTASSIUM 100 MG: 50 TABLET, FILM COATED ORAL at 08:30

## 2023-07-24 RX ADMIN — SENNOSIDES AND DOCUSATE SODIUM 2 TABLET: 50; 8.6 TABLET ORAL at 08:30

## 2023-07-24 RX ADMIN — INSULIN LISPRO 4 UNITS: 100 INJECTION, SOLUTION INTRAVENOUS; SUBCUTANEOUS at 11:27

## 2023-07-24 RX ADMIN — BUMETANIDE 1 MG: 1 TABLET ORAL at 21:17

## 2023-07-24 NOTE — CASE MANAGEMENT/SOCIAL WORK
Continued Stay Note  [facility]     Patient Name: Jermaine Singh  MRN: [id number]  Today's Date: 7/24/2023    Admit Date: 7/23/2023    Plan: To be determined   Discharge Plan       Row Name 07/24/23 1514       Plan    Plan To be determined    Plan Comments I spoke with patient in regards to discharge planning. He resides with spouse in [place]. From records, has the following DME: shower chair, rollator, wheelchair, recliner lift. Insurance is Medicare and [employer]. He tells me he will know the plans soon for what he will need. I will discuss this further at another time.    Final Discharge Disposition Code 30 - still a patient                   Discharge Codes    No documentation.                 Expected Discharge Date and Time       Expected Discharge Date Expected Discharge Time    Jul 26, 2023               Jenna Coe RN

## 2023-07-24 NOTE — H&P
[facility] Medicine Services  HISTORY AND PHYSICAL    Patient Name: Jermaine Singh  : 1945  MRN: [id number]  Primary Care Physician: Marysol Shrestha MD  Date of admission: 2023      Subjective   Subjective     Chief Complaint:  Left diabetic foot wound    HPI:  Jermaine Singh is a 78 y.o. male vasculopath with type 2 diabetes mellitus and associated neuropathy who underwent prior left great toe and second toe amputations for wet gangrene and osteomyelitis (Dr. Gross CT surgery, Dr. Braga ID).  Patient presents to [facility] ED complaining of left forefoot edema and erythema with developing wound patient states that he performs foot checks and on 2023 states this current infection was not present.    Patient does not wear it been orthotic shoe, he wears sandals to avoid pressure on his feet.  Patient denies any recent trauma or falls, denies any recent changes in footwear.  No fever, chills, rigors.  Patient has significant neuropathy without feeling to the area.      Review of Systems   Gen- No fevers, chills, HA, uncontrolled pain  CV- No chest pain, palpitations, new edema  Resp- No SOA, cough  GI- No N/V/D, abd pain, constipation      Personal History     Past Medical History:   Diagnosis Date    Allergic     Arthritis     Asthma     Coronary artery disease     Diabetes mellitus 2000    started on inuslin 2018; started on po meds in ; checking blood sugars daily     Disease of thyroid gland     po meds daily for hypothyroidism     Elevated serum creatinine 11/15/2022    Elevated troponin 10/02/2022    Generalized weakness 11/15/2022    History of fracture as a child     rt leg- severe     Hyperlipidemia     Hypertension     Hypothyroidism     PAF (paroxysmal atrial fibrillation) 2023    Peripheral neuropathy     Peripheral vascular disease     s/p angiogram -needs stent in left leg     Pleural effusion, bilateral 11/15/2022     Proximal phalanx fracture of the second digit extending into the second metatarsal joint 07/28/2022    RBBB     Right knee pain     Unstable angina 02/12/2019    Added automatically from request for surgery [id number]    Vitamin D deficiency              Past Surgical History:   Procedure Laterality Date    AMPUTATION DIGIT Left 01/17/2023    Procedure: AMPUTATION DIGIT LEFT;  Surgeon: Gopal Márquez MD;  Location:  [facility];  Service: Vascular;  Laterality: Left;    APPENDECTOMY      CARDIAC CATHETERIZATION N/A 02/14/2019    Procedure: Left Heart Cath;  Surgeon: Cooper Apodaca MD;  Location:  [facility];  Service: Cardiology    CARDIAC ELECTROPHYSIOLOGY PROCEDURE N/A 05/18/2022    Procedure: DEVICE IMPLANT;  Surgeon: Cooper Apodaca MD;  Location:  [facility];  Service: Cardiology;  Laterality: N/A;    CARDIAC SURGERY      COLONOSCOPY      CORONARY ARTERY BYPASS GRAFT N/A 03/22/2019    Procedure: MEDIAN STERNOTOMY, CORONARY ARTERY BYPASS GRAFT X3, UTILIZING THE LEFT INTERNAL MAMMARY ARTERY, EVH AND OPEN HARVEST OF THE RIGHT GREATER SAPHENOUS VEIN, EXPLORATION OF THE LEFT LEG;  Surgeon: Ap Au MD;  Location:  [facility];  Service: Cardiothoracic    EYE SURGERY Bilateral     cataracts     FRACTURE SURGERY      INTERVENTIONAL RADIOLOGY PROCEDURE N/A 07/29/2021    Procedure: Abdominal Aortagram with Runoff;  Surgeon: Jermaine Hernandez MD;  Location:  [facility];  Service: Cardiovascular;  Laterality: N/A;    KNEE ARTHROSCOPY      right x 2, left x 1    LACERATION REPAIR      right leg    LEG SURGERY      2 for fracture of rt leg     TONSILLECTOMY      Adnoidectomy    TRANS METATARSAL AMPUTATION Left 08/02/2022    Procedure: GREAT TOE AMPUTATION LEFT;  Surgeon: Gopal Márquez MD;  Location:  [facility];  Service: Vascular;  Laterality: Left;       Family History: family history includes Cancer in his father; Coronary artery disease in his mother;  Diabetes in his mother; Hyperlipidemia in his mother.     Social History:  reports that he has never smoked. He has been exposed to tobacco smoke. He has never used smokeless tobacco. He reports that he does not currently use alcohol. He reports that he does not use drugs.  Social History     Social History Narrative    Lives in [place].       Medications:  Available home medication information reviewed.  Medications Prior to Admission   Medication Sig Dispense Refill Last Dose    acetaminophen (TYLENOL) 325 MG tablet Take 2 tablets by mouth Every 6 (Six) Hours As Needed for Mild Pain.       apixaban (ELIQUIS) 5 MG tablet tablet Take 1 tablet by mouth Every 12 (Twelve) Hours. Indications: DVT/PE (active thrombosis) 180 tablet 0     aspirin 81 MG chewable tablet Chew 1 tablet Daily. 90 tablet 3     atorvastatin (LIPITOR) 20 MG tablet Take 1 tablet by mouth Every Night. 90 tablet 3     bumetanide (BUMEX) 1 MG tablet Take 1 tablet by mouth 2 (Two) Times a Day. (Patient taking differently: Take 1 tablet by mouth 2 (Two) Times a Day. Qd-bid) 60 tablet 6     carvedilol (COREG) 3.125 MG tablet Take 1 tablet by mouth 2 (Two) Times a Day With Meals.       Cholecalciferol (VITAMIN D3) 5000 units tablet tablet Take 1 tablet by mouth Daily.       Diclofenac Sodium (VOLTAREN) 1 % gel gel Apply 2 g topically to the appropriate area as directed 4 (Four) Times a Day As Needed (pain).       famotidine (PEPCID) 20 MG tablet Take 1 tablet by mouth Daily. 90 tablet 3     glucose blood (OneTouch Verio) test strip 1 each by Other route 2 (Two) Times a Day. Use as instructed 100 each 3     insulin detemir (LEVEMIR) 100 UNIT/ML injection Inject 8 Units under the skin into the appropriate area as directed Every Night. (Patient taking differently: Inject 8 Units under the skin into the appropriate area as directed Daily.)  12     Insulin Lispro (humaLOG) 100 UNIT/ML injection Inject 3 Units under the skin into the appropriate area as  directed 3 (Three) Times a Day Before Meals. Under 150 3 unit  150-200 4 units  200-250- 5 unit       levothyroxine (SYNTHROID, LEVOTHROID) 88 MCG tablet Take 1 tablet by mouth Every Morning. 90 tablet 3     linaclotide (Linzess) 290 MCG capsule capsule Take 1 capsule by mouth Every Morning Before Breakfast. 30 capsule 2     losartan (COZAAR) 100 MG tablet Take 1 tablet by mouth Daily.       magnesium oxide (MAG-OX) 400 MG tablet Take 1 tablet by mouth Daily. 90 tablet 3     Misc. Devices (Rollator Ultra-Light) misc 1 each Daily. 1 each 0     nitroglycerin (NITROSTAT) 0.4 MG SL tablet Place 1 tablet under the tongue Every 5 (Five) Minutes As Needed for Chest Pain. Take no more than 3 doses in 15 minutes. 9 tablet 12     nystatin (MYCOSTATIN) 035587 UNIT/GM powder Apply  topically to the appropriate area as directed 2 (Two) Times a Day. 60 g 5     tamsulosin (FLOMAX) 0.4 MG capsule 24 hr capsule Take 1 capsule by mouth Daily. 90 capsule 1     Xiidra 5 % ophthalmic solution Administer 1 drop to both eyes 2 (Two) Times a Day.          Allergies   Allergen Reactions    Vancomycin Other (See Comments)     Kidney failure per last admission       Objective   Objective     Vital Signs:   Temp:  [98.5 °F (36.9 °C)] 98.5 °F (36.9 °C)  Heart Rate:  [55-97] 55  Resp:  [19-20] 19  BP: ()/(77-91) 90/77       Physical Exam   Constitutional: No acute distress, awake, alert, chronically ill-appearing, thin body habitus  Respiratory: Clear to auscultation bilaterally, good effort, nonlabored respirations   Cardiovascular: RRR, no murmur  Musculoskeletal: Chronic trace right distal leg and ankle edema, normal muscle tone for age  Psychiatric: Appropriate affect, good insight and judgement, cooperative  Neurologic: Oriented x 3, movements symmetric BUE and BLE, Cranial Nerves grossly intact, speech clear and fluent  Skin: Status post amputation of left great toe and second toe.  Left medial forefoot with wet appearing early  gangrenous changes surrounded by spreading confluent erythema.  Surprisingly area is not currently hot however the dorsum of the foot has desquamating skin with weeping area noted possibly a puncture site?      LAB RESULTS:      Lab 07/23/23 1823 07/23/23  1746   WBC  --  10.88*   HEMOGLOBIN  --  12.2*   HEMATOCRIT  --  39.0   PLATELETS  --  179   NEUTROS ABS  --  8.67*   IMMATURE GRANS (ABS)  --  0.03   LYMPHS ABS  --  0.97   MONOS ABS  --  0.86   EOS ABS  --  0.30   MCV  --  91.5   SED RATE  --  30*   CRP 3.60*  --    LACTATE  --  1.6         Lab 07/23/23 1823   SODIUM 142   POTASSIUM 4.8   CHLORIDE 105   CO2 25.0   ANION GAP 12.0   BUN 46*   CREATININE 1.65*   EGFR 42.2*   GLUCOSE 250*   CALCIUM 8.8         Lab 07/23/23 1823   TOTAL PROTEIN 6.4   ALBUMIN 3.4*   GLOBULIN 3.0   ALT (SGPT) 28   AST (SGOT) 25   BILIRUBIN 0.5   ALK PHOS 114   LIPASE 11*         Lab 07/23/23 2020 07/23/23 1823   PROBNP  --  33,171.0*   HSTROP T 104* 103*                 UA          8/23/2022    13:09 11/15/2022    10:03 1/21/2023    09:07   Urinalysis   Squamous Epithelial Cells, UA 3-6  0-2  0-2    Specific Gravity, UA 1.014  1.012  1.016    Ketones, UA Negative  Negative  15 mg/dL (1+)    Blood, UA Large (3+)  Moderate (2+)  Large (3+)    Leukocytes, UA Trace  Moderate (2+)  Large (3+)    Nitrite, UA Negative  Negative  Negative    RBC, UA Too Numerous to Count  13-20  3-6    WBC, UA 6-12  Too Numerous to Count  Too Numerous to Count    Bacteria, UA None Seen  Trace  3+        Microbiology Results (last 10 days)       ** No results found for the last 240 hours. **            XR Foot 3+ View Left    Result Date: 7/23/2023  XR FOOT 3+ VW LEFT Date of Exam: 7/23/2023 5:03 PM EDT Indication: Left foot swelling, redness, and blistering, previous partial forefoot amputation. Comparison: Left foot x-ray performed on 7/27/2022 and a left toe x-ray from 1/16/2023. Findings: The patient is status post amputation of the left great toe and  the distal first metatarsal bone. The patient's had interval amputation of most of the left second toe. The base of the proximal phalanx remains. There is no cortical destruction or suspicious periostitis to indicate radiographic changes of osteomyelitis. There is mature periosteal reaction along the shafts of the second through fourth metatarsal bones which can be seen with venous stasis. There is mild spurring within the midfoot consistent with arthritis. There is a prominent calcaneal enthesophyte. There are scattered vascular calcifications.     Impression: Impression: 1. Status post amputation procedures of the left foot as described. 2. No radiographic changes of osteomyelitis. 3. Additional incidental findings as noted above. Electronically Signed: Pan Sharp  7/23/2023 5:22 PM EDT  Workstation ID: [id number]    XR Chest 1 View    Result Date: 7/23/2023  XR CHEST 1 VW Date of Exam: 7/23/2023 4:47 PM EDT Indication: Chest Pain Triage Protocol Comparison: 1/16/2023 Findings: Patient is status post median sternotomy and CABG. Left chest wall pacemaker is present. Cardiac silhouette is enlarged. No consolidation or mass is seen. No pleural effusion or pneumothorax is seen. No acute osseous lesion is seen.     Impression: Impression: 1.Cardiomegaly. No acute radiographic abnormality is identified. Electronically Signed: Lucio Lanier  7/23/2023 4:54 PM EDT  Workstation ID: [id number]     Results for orders placed during the hospital encounter of 07/27/22    Adult Transthoracic Echo Complete W/ Cont if Necessary Per Protocol    Interpretation Summary  · Left ventricular ejection fraction appears to be 46 - 50%. Left ventricular systolic function is low normal.  · Left ventricular septal hypokinesis  · No significant valvular heart disease      Assessment & Plan   Assessment & Plan     Active Hospital Problems    Diagnosis  POA    **Diabetic foot infection [E11.628, L08.9]  Yes    Status post amputation of great toe  "and second toe of left foot [Z89.422]  Not Applicable    PAF (paroxysmal atrial fibrillation) [I48.0]  Yes    PVD (peripheral vascular disease) [I73.9]  Yes    Stage 3b chronic kidney disease [N18.32]  Yes    Chronic HFrEF (heart failure with reduced ejection fraction) [I50.22]  Yes    SSS (sick sinus syndrome) [I49.5]  Yes     Added automatically from request for surgery [id number]      Atherosclerosis of native artery of both lower extremities with intermittent claudication [I70.213]  Yes     Added automatically from request for surgery [id number]      Type 2 diabetes mellitus [E11.9]  Yes     HA1C on 3/21/19 9.0 improved from 11.1 on 1/8/19      Hypertension [I10]  Yes    Hyperlipidemia [E78.5]  Yes    Hypothyroidism (acquired) [E03.9]  Yes    S/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 Au [Z95.1]  Not Applicable    Diabetic neuropathy [E11.40]  Yes       Left forefoot diabetic wound infection with secondary cellulitis  Hx of amputation left great toe and second toe (Dr. Márquez)  - per patient area of infection new since 7/21/23  - wears \"sandals\" but not orthopedic shoes, denies known injury but has severe neuropathy  - Rocephin and Linezolid (Vanc allergic)  - patient known to Gladis Braga of ID and [facility] of CT/Vasc Surgery -- consults placed  - HOLD Eliquis for now...    DM2  Diabetic neuropathy  - last A1c was January, recheck added to blood in lab  - continue qam Levemir home dose, add moderate dose SSI    CKD IIIb  - baseline creat ~1.6 - 1.7 and GFR ~40  PVD  CAD s/p CABG  Chronic diastolic CHF  HTN  HL  Hypothyroid  - chronic / stable  - home meds as ordered      DVT prophylaxis:  HOLD Eliquis for now...      CODE STATUS:    Code Status and Medical Interventions:   Ordered at: 07/23/23 4943     Code Status (Patient has no pulse and is not breathing):    CPR (Attempt to Resuscitate)     Medical Interventions (Patient has pulse or is breathing):    Full Support     Release to patient:    Routine Release       Expected " Discharge 7/27/23  Expected discharge date/ time has not been documented.     Fariba Olmos MD  07/23/23

## 2023-07-24 NOTE — PLAN OF CARE
Goal Outcome Evaluation:  Plan of Care Reviewed With: patient        Progress: improving          Pt arrived from the ED. He is alert and oriented X 4. VSS. RA. Voiding spontaneously using  urinal.

## 2023-07-24 NOTE — TELEPHONE ENCOUNTER
Provider: DR CANNON  Caller: HARSHAD DAWN  Relationship to Patient: SELF  Phone Number: [phone]   Reason for Call: PATIENT WOULD LIKE FOR PCP TO KNOW THAT HE IS IN ROOM 352 AT [facility].

## 2023-07-24 NOTE — CONSULTS
[facility] Cardiothoracic Surgery In-Patient Consult    Name:  Jermaine Singh  MRN Number:  [id number]  Date of Admission:  7/23/2023  Date of Consultation: 7/24/2023    Consulting Provider:    PCP: Marysol Shrestha MD  IP Care Team:  Patient Care Team:  Marysol Shrestha MD as PCP - General (Internal Medicine)  Mary, Jermaine Cervantes MD as Consulting Physician (Cardiology)    Reason for Consultation: Left foot cellulitis     History of Present Illness:    Jermaine Singh is a 78 y.o. male with a history of hypertension, hyperlipidemia, coronary artery disease s/p CABG x3 by Dr. Au on 3/22/2019, chronic heart failure, DVT, type 2 diabetes mellitus w/ neuropathy, GERD, chronic kidney disease stage III, anemia, PVD, left great toe amputation in 2022, osteomyelitis s/p left second toe amputation with primary closure by Dr. Márquez on 3/17/2023. He presented to [facility] ER on 7/23 for 1 day history of left foot erythema, swelling, and drainage. He denies any fevers, chills, or malaise. Labs [id number] on admission: proBNP 33,171, troponin 103, CRP 3.6, sed rate 30, and WBCs 10.88. Blood cultures are currently pending. X-ray of left foot was unremarkable.     Review of Systems:  Review of Systems   HENT: Negative.     Eyes: Negative.    Respiratory: Negative.     Cardiovascular:  Positive for leg swelling.   Gastrointestinal: Negative.    Endocrine: Negative.    Genitourinary: Negative.    Musculoskeletal: Negative.    Skin:  Positive for color change and wound.   Allergic/Immunologic: Negative.    Neurological: Negative.    Hematological: Negative.    Psychiatric/Behavioral: Negative.       Hospital Problems:   Diabetic foot infection    Type 2 diabetes mellitus    Hypertension    Hyperlipidemia    Hypothyroidism (acquired)    Diabetic neuropathy    S/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 Au    Atherosclerosis of native artery of both lower extremities with intermittent claudication    SSS (sick sinus syndrome)     Chronic HFrEF (heart failure with reduced ejection fraction)    PVD (peripheral vascular disease)    Stage 3b chronic kidney disease    Status post amputation of great toe and second toe of left foot    PAF (paroxysmal atrial fibrillation)       Past Medical History:    Past Medical History:   Diagnosis Date    Allergic     Arthritis     Asthma     Coronary artery disease     Diabetes mellitus 2000    started on inuslin 12/2018; started on po meds in 2000; checking blood sugars daily     Disease of thyroid gland     po meds daily for hypothyroidism     Elevated serum creatinine 11/15/2022    Elevated troponin 10/02/2022    Generalized weakness 11/15/2022    History of fracture as a child     rt leg- severe     Hyperlipidemia     Hypertension     Hypothyroidism     PAF (paroxysmal atrial fibrillation) 07/23/2023    Peripheral neuropathy     Peripheral vascular disease     s/p angiogram 2/19-needs stent in left leg     Pleural effusion, bilateral 11/15/2022    Proximal phalanx fracture of the second digit extending into the second metatarsal joint 07/28/2022    RBBB     Right knee pain     Unstable angina 02/12/2019    Added automatically from request for surgery [id number]    Vitamin D deficiency        Past Surgical History:    Past Surgical History:   Procedure Laterality Date    AMPUTATION DIGIT Left 01/17/2023    Procedure: AMPUTATION DIGIT LEFT;  Surgeon: Gopal Márquez MD;  Location:  [facility];  Service: Vascular;  Laterality: Left;    APPENDECTOMY      CARDIAC CATHETERIZATION N/A 02/14/2019    Procedure: Left Heart Cath;  Surgeon: Cooper Apodaca MD;  Location: [facility] CATH INVASIVE LOCATION;  Service: Cardiology    CARDIAC ELECTROPHYSIOLOGY PROCEDURE N/A 05/18/2022    Procedure: DEVICE IMPLANT;  Surgeon: Cooper Apodaca MD;  Location: [facility] CATH INVASIVE LOCATION;  Service: Cardiology;  Laterality: N/A;    CARDIAC SURGERY      COLONOSCOPY      CORONARY ARTERY BYPASS GRAFT N/A 03/22/2019     Procedure: MEDIAN STERNOTOMY, CORONARY ARTERY BYPASS GRAFT X3, UTILIZING THE LEFT INTERNAL MAMMARY ARTERY, EVH AND OPEN HARVEST OF THE RIGHT GREATER SAPHENOUS VEIN, EXPLORATION OF THE LEFT LEG;  Surgeon: Ap Au MD;  Location:  [facility];  Service: Cardiothoracic    EYE SURGERY Bilateral     cataracts     FRACTURE SURGERY      INTERVENTIONAL RADIOLOGY PROCEDURE N/A 07/29/2021    Procedure: Abdominal Aortagram with Runoff;  Surgeon: Jermaine Hernandez MD;  Location:  [facility] CATH INVASIVE LOCATION;  Service: Cardiovascular;  Laterality: N/A;    KNEE ARTHROSCOPY      right x 2, left x 1    LACERATION REPAIR      right leg    LEG SURGERY      2 for fracture of rt leg     TONSILLECTOMY      Adnoidectomy    TRANS METATARSAL AMPUTATION Left 08/02/2022    Procedure: GREAT TOE AMPUTATION LEFT;  Surgeon: Gopal Márquez MD;  Location:  [facility] OR;  Service: Vascular;  Laterality: Left;       Family History:    Family History   Problem Relation Age of Onset    Coronary artery disease Mother     Diabetes Mother     Hyperlipidemia Mother     Cancer Father        Social History:    Social History     Socioeconomic History    Marital status:     Number of children: 2   Tobacco Use    Smoking status: Never     Passive exposure: Past    Smokeless tobacco: Never   Vaping Use    Vaping Use: Never used   Substance and Sexual Activity    Alcohol use: Not Currently     Comment: every 2-3 months    Drug use: No    Sexual activity: Defer       Allergies:  Allergies   Allergen Reactions    Vancomycin Other (See Comments)     Kidney failure per last admission         Physical Exam:  Vital Signs:    Temp:  [98.2 °F (36.8 °C)-98.8 °F (37.1 °C)] 98.4 °F (36.9 °C)  Heart Rate:  [55-97] 55  Resp:  [16-20] 16  BP: ()/(54-91) 94/54  Body mass index is 22.4 kg/m².     Physical Exam  Vitals and nursing note reviewed.   Constitutional:       Appearance: Normal appearance.   HENT:      Head: Normocephalic.      Mouth/Throat:       Pharynx: Oropharynx is clear.   Eyes:      Pupils: Pupils are equal, round, and reactive to light.   Cardiovascular:      Rate and Rhythm: Normal rate.      Pulses:           Dorsalis pedis pulses are detected w/ Doppler on the right side and detected w/ Doppler on the left side.      Comments: Weak, dopplerable left DP present  Pulmonary:      Effort: Pulmonary effort is normal.   Abdominal:      General: Bowel sounds are normal.   Musculoskeletal:         General: Normal range of motion.      Cervical back: Normal range of motion.      Left lower leg: Edema present.   Skin:     Findings: Erythema and wound present.      Comments: See images below   Neurological:      General: No focal deficit present.      Mental Status: He is alert and oriented to person, place, and time.   Psychiatric:         Mood and Affect: Mood normal.         Behavior: Behavior normal.   Left foot:      Labs/Imaging/Procedures:   Results from last 7 days   Lab Units 07/23/23  1823   SODIUM mmol/L 142   POTASSIUM mmol/L 4.8   CHLORIDE mmol/L 105   CO2 mmol/L 25.0   BUN mg/dL 46*   CREATININE mg/dL 1.65*   CALCIUM mg/dL 8.8   BILIRUBIN mg/dL 0.5   ALK PHOS U/L 114   ALT (SGPT) U/L 28   AST (SGOT) U/L 25   GLUCOSE mg/dL 250*     Results from last 7 days   Lab Units 07/23/23  2020 07/23/23  1823   HSTROP T ng/L 104* 103*     Results from last 7 days   Lab Units 07/23/23  1746   WBC 10*3/mm3 10.88*   HEMOGLOBIN g/dL 12.2*   HEMATOCRIT % 39.0   PLATELETS 10*3/mm3 179                 XR Foot 3+ View Left    Result Date: 7/23/2023  Impression: 1. Status post amputation procedures of the left foot as described. 2. No radiographic changes of osteomyelitis. 3. Additional incidental findings as noted above. Electronically Signed: Pan Sharp  7/23/2023 5:22 PM EDT  Workstation ID: [id number]    XR Chest 1 View    Result Date: 7/23/2023  Impression: 1.Cardiomegaly. No acute radiographic abnormality is identified. Electronically Signed: Lucio Lanier   7/23/2023 4:54 PM EDT  Workstation ID: [id number]    [facility]: Results for orders placed during the hospital encounter of 02/14/19    Cardiac Catheterization/Vascular Study    Narrative  Procedure: Left heart catheterization coronary angiography left ventriculography  Abdominal aortography with runoff.    Indication: Chest pain refractory to medical therapy with abnormal stress test.  Nonhealing leg ulcer with abnormal noninvasive study    Informed consent consent was obtained the patient brought to the Cath Lab replacement table in supine position.  The right groin was prepped draped and anesthetized in usual sterile fashion.  Using modified Seldinger technique a 6 [place] replacement regular margin/I saline.  6 [place] Aleta L and R 4 catheters were used to perform angiography.    A 6 [place] pigtail catheter was used to perform ventriculography..  A 6 [place] pigtail catheter was also used to perform abdominal aortography with runoff down to the ankles.  At the completion of procedure the catheters were removed and a femoral angiograms obtained.  The sheath was then removed and Angio-Seal closure.    The results of the study were as follows:    Hemodynamic data: The ascending aortic pressures 150/80.  LVEDP was 14.    Left ventriculography: The left ventricle was nondilated.  Regional wall motion was normal.  Ejection fraction was 60%.    Coronary angiography:    LM: Calcific plaque up to 40 % stenosis.    Right coronary artery: 100% occlusion proximal.    Circumflex coronary artery: Large codominant vessel with 60% stenosis proximal.  Diffuse disease in the distal marginal branches.  Subtotal occlusion of a mid marginal branch.    Left anterior descending coronary artery: Diffusely diseased vessel.  70% stenosis in the ostium extending into the left main.  Severe atherosclerotic plaque and calcification noted throughout the proximal and mid vessel.  90% stenosis distally prior to the bifurcated apical branches.  90%  stenosis proximal segment of major diagonal branch.    Abdominal aortography: Abdominal aorta no significant obstructive disease nor aneurysm formation.  Renal arteries patent bilaterally.  Common iliacs normal bilaterally.  The common iliacs in the internal and external iliacs are normal.    The right SFA is free of disease up into the popliteal portion of the vessel at which point there is 30-40% stenosis.  There is diffuse disease in apparently one vessel runoff in the infrapopliteal vessels.    The left SFA is widely patent with luminal irregularity there is 50-60% narrowing in the distalmost portion of the FSA in the popliteal region.  There is severe infrapopliteal disease with one-vessel runoff.    Conclusion: Three-vessel coronary artery disease with normal LV systolic function.  His disease is not amenable to catheter-based intervention.  A surgical consultation will be obtained.    Regarding his peripheral vascular disease, the proximal vessels are relatively free of disease.  His PAD is confined primarily to infrapopliteal disease of the small vessels again poorly amenable to catheter-based intervention.     Echo: Results for orders placed during the hospital encounter of 07/27/22    Adult Transthoracic Echo Complete W/ Cont if Necessary Per Protocol    Interpretation Summary  · Left ventricular ejection fraction appears to be 46 - 50%. Left ventricular systolic function is low normal.  · Left ventricular septal hypokinesis  · No significant valvular heart disease     Carotid Duplex: Results for orders placed during the hospital encounter of 07/27/22    Duplex Renal Artery - Bilateral Complete CAR    Interpretation Summary  US RENAL ARTERIES. DUPLEX RENAL ARTERY BILATERAL COMPLETE CAR-    History: Acute kidney injury in the setting of peripheral vascular  disease as well as history of CABG, rule out WILMA.    Comparison: None.    Technique: Two-dimensional grayscale, color flow, pulse wave spectral  Doppler  examination of the right and left renal circulation. The study  is not optimized for the examination of the right and left renal  parenchyma.    Limitations: Bowel gas and patient's body habitus. The patient was short  of breath and unable to cooperate with breath-holding. This also limited  the visualization of the renal arteries.    Findings:    Abdominal aorta:  AP diameter of the Proximal segment: 1.7 cm. This is normal.  Peak systolic velocity in the suprarenal aorta is noted below. The  spectral Doppler waveform shows Low resistance waveform. There is  spectral broadening.    Renal artery ratios:  Right: Within normal limits.  Left: Within normal limits.    Right kidney: Limited examination  Size: Normal  Echogenicity: within normal limits to the extent seen.  Other findings: Not applicable.    Left kidney: Limited examination  Size: Normal  Echogenicity: within normal limits to the extent seen.  Other findings: A 1.3 x 1.4 cm cyst is mentioned in the note but no  corresponding images are available for my review of the study.    Right renal circulation:  Main right renal artery:  Peak systolic velocity criteria support absence of hemodynamically  significant renal artery stenosis.  The resistive index is elevated.  Acceleration time at the hilum is borderline above the upper limits of  normal.    Right renal vein is patent.    Left renal circulation:  Main left renal artery:  Peak systolic velocity criteria support absence of hemodynamically  significant renal artery stenosis.  The resistive index is elevated.  Acceleration time at the hilum is normal.    Left renal vein is patent.    Other findings:  None.    Impression  Impression:  No sonographic evidence of clinically significant stenosis of the right  or the left renal artery on this exam.    The resistive indices are elevated bilaterally that in combination with  the findings suggest parenchymal renal disease.    Please note that the study is not  optimized for evaluation of the right  and left renal parenchyma. A separate dedicated ultrasound examination  should be performed for that purpose.    This report was finalized on 8/16/2022 1:23 PM by Sinan Dinero MD.        Assessment:    Diabetic foot infection    Type 2 diabetes mellitus    Hypertension    Hyperlipidemia    Hypothyroidism (acquired)    Diabetic neuropathy    S/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 Au    Atherosclerosis of native artery of both lower extremities with intermittent claudication    SSS (sick sinus syndrome)    Chronic HFrEF (heart failure with reduced ejection fraction)    PVD (peripheral vascular disease)    Stage 3b chronic kidney disease    Status post amputation of great toe and second toe of left foot    PAF (paroxysmal atrial fibrillation)      Plan:  Hold Eliquis  NPO after midnight for possible arteriogram with Dr. Au  MRI of left foot without contrast  ID consult  Requesting to see his cardiologist Dr. Apodaca while in the hospital      I Ap Au agree with the above.  I had performed this patient's coronary surgery in approximately 2019 and he remembers me and I do recall him.  I had originally thought we would proceed with an arteriogram of his left leg to see if any catheter-based intervention could be performed or need to be performed.  This could be done with minimal contrast agent secondary to patient's poor GFR.  CT angiogram would almost be prohibitive.  However I was able to locate a formal arteriogram performed by Dr. Hernandez approximately 2 years ago.  This patient has extensive trifurcation disease at the level of the mid calf and ankle with only a's fairly discontinuous anterior tibial artery that Dr. Hernandez performed angioplasty on the peroneal and the posterior tibial are essentially gone with only approximately a 3 cm section of reconstitution of the distal posterior tibial.  In summary there is no further adequate revascularization that can  be performed.      Hopefully this patient's infection will improve and that no further amputation need to be done.  In my opinion the only amputation that would potentially heal at this point would be a below-knee amputation.  Patient would like to continue with antibiotics for now but ultimately the next surgery on that leg would be a below the knee amputation    Chloe Man, DAVID  07:43 EDT  07/24/23     Thank you for allowing us to participate in the care of your patient. Please do not hesitate to contact us with additional questions or concerns.

## 2023-07-24 NOTE — PROGRESS NOTES
[facility] Medicine Services  PROGRESS NOTE    Patient Name: Jermaine Singh  : 1945  MRN: [id number]    Date of Admission: 2023  Primary Care Physician: Marysol Shrestha MD    Subjective   Subjective     CC:  Foot drainage    HPI:  Asking multiple questions about meds and plan of care, forgetful at times    ROS:  Gen- No fevers, chills  CV- No chest pain, palpitations  Resp- No cough, dyspnea  GI- No N/V/D, abd pain      Objective   Objective     Vital Signs:   Temp:  [98 °F (36.7 °C)-98.8 °F (37.1 °C)] 98 °F (36.7 °C)  Heart Rate:  [55-97] 60  Resp:  [16-20] 16  BP: ()/(54-91) 101/70     Physical Exam:  Constitutional: No acute distress, awake, alert  HENT: NCAT, mucous membranes moist  Respiratory: Respiratory effort normal   Cardiovascular: RRR, no murmurs  Gastrointestinal: Positive bowel sounds, soft, nontender, nondistended  Musculoskeletal: No bilateral ankle edema, L foot wrapped, some drainage through wrap  Psychiatric: Flat affect, cooperative  Neurologic: Oriented x 2-3, speech clear  Skin: No rashes      Results Reviewed:  LAB RESULTS:      Lab 23  1746   WBC  --  10.88*   HEMOGLOBIN  --  12.2*   HEMATOCRIT  --  39.0   PLATELETS  --  179   NEUTROS ABS  --  8.67*   IMMATURE GRANS (ABS)  --  0.03   LYMPHS ABS  --  0.97   MONOS ABS  --  0.86   EOS ABS  --  0.30   MCV  --  91.5   SED RATE  --  30*   CRP 3.60*  --    LACTATE  --  1.6         Lab 235 23  1823   SODIUM  --  142   POTASSIUM  --  4.8   CHLORIDE  --  105   CO2  --  25.0   ANION GAP  --  12.0   BUN  --  46*   CREATININE  --  1.65*   EGFR  --  42.2*   GLUCOSE  --  250*   CALCIUM  --  8.8   HEMOGLOBIN A1C 8.70*  --          Lab 23   TOTAL PROTEIN 6.4   ALBUMIN 3.4*   GLOBULIN 3.0   ALT (SGPT) 28   AST (SGOT) 25   BILIRUBIN 0.5   ALK PHOS 114   LIPASE 11*         Lab 23  1823   PROBNP  --  33,171.0*   HSTROP T 104* 103*                  Brief Urine Lab Results  (Last result in the past 365 days)        Color   Clarity   Blood   Leuk Est   Nitrite   Protein   CREAT   Urine HCG        01/21/23 0907 Yellow   Turbid   Large (3+)   Large (3+)   Negative   100 mg/dL (2+)                   Microbiology Results Abnormal       None            XR Foot 3+ View Left    Result Date: 7/23/2023  XR FOOT 3+ VW LEFT Date of Exam: 7/23/2023 5:03 PM EDT Indication: Left foot swelling, redness, and blistering, previous partial forefoot amputation. Comparison: Left foot x-ray performed on 7/27/2022 and a left toe x-ray from 1/16/2023. Findings: The patient is status post amputation of the left great toe and the distal first metatarsal bone. The patient's had interval amputation of most of the left second toe. The base of the proximal phalanx remains. There is no cortical destruction or suspicious periostitis to indicate radiographic changes of osteomyelitis. There is mature periosteal reaction along the shafts of the second through fourth metatarsal bones which can be seen with venous stasis. There is mild spurring within the midfoot consistent with arthritis. There is a prominent calcaneal enthesophyte. There are scattered vascular calcifications.     Impression: Impression: 1. Status post amputation procedures of the left foot as described. 2. No radiographic changes of osteomyelitis. 3. Additional incidental findings as noted above. Electronically Signed: Pan Sharp  7/23/2023 5:22 PM EDT  Workstation ID: [id number]    XR Chest 1 View    Result Date: 7/23/2023  XR CHEST 1 VW Date of Exam: 7/23/2023 4:47 PM EDT Indication: Chest Pain Triage Protocol Comparison: 1/16/2023 Findings: Patient is status post median sternotomy and CABG. Left chest wall pacemaker is present. Cardiac silhouette is enlarged. No consolidation or mass is seen. No pleural effusion or pneumothorax is seen. No acute osseous lesion is seen.     Impression: Impression: 1.Cardiomegaly. No acute  radiographic abnormality is identified. Electronically Signed: Lucio Lanier  7/23/2023 4:54 PM EDT  Workstation ID: [id number]     Results for orders placed during the hospital encounter of 07/27/22    Adult Transthoracic Echo Complete W/ Cont if Necessary Per Protocol    Interpretation Summary  · Left ventricular ejection fraction appears to be 46 - 50%. Left ventricular systolic function is low normal.  · Left ventricular septal hypokinesis  · No significant valvular heart disease      Current medications:  Scheduled Meds:aspirin, 81 mg, Oral, Daily  atorvastatin, 20 mg, Oral, Nightly  bumetanide, 1 mg, Oral, BID  carvedilol, 3.125 mg, Oral, BID With Meals  cefTRIAXone, 1,000 mg, Intravenous, Q24H  enoxaparin, 1 mg/kg, Subcutaneous, Q12H  famotidine, 20 mg, Oral, Daily  insulin detemir, 8 Units, Subcutaneous, Nightly  insulin lispro, 2-9 Units, Subcutaneous, 4x Daily AC & at Bedtime  levothyroxine, 88 mcg, Oral, Q AM  Linezolid, 600 mg, Intravenous, Q12H  losartan, 100 mg, Oral, Daily  magnesium oxide, 400 mg, Oral, Daily  nystatin, , Topical, BID  senna-docusate sodium, 2 tablet, Oral, BID  sodium chloride, 10 mL, Intravenous, Q12H  tamsulosin, 0.4 mg, Oral, Daily      Continuous Infusions:Pharmacy Consult - Pharmacy to dose,       PRN Meds:.  acetaminophen    senna-docusate sodium **AND** polyethylene glycol **AND** bisacodyl **AND** bisacodyl    calcium carbonate    dextrose    dextrose    glucagon (human recombinant)    HYDROcodone-acetaminophen    Morphine **AND** naloxone    nitroglycerin    ondansetron **OR** ondansetron    Pharmacy Consult - Pharmacy to dose    QUEtiapine    sodium chloride    sodium chloride    sodium chloride    Assessment & Plan   Assessment & Plan     Active Hospital Problems    Diagnosis  POA    **Diabetic foot infection [E11.628, L08.9]  Yes    Status post amputation of great toe and second toe of left foot [Z89.422]  Not Applicable    PAF (paroxysmal atrial fibrillation) [I48.0]   Yes    PVD (peripheral vascular disease) [I73.9]  Yes    Stage 3b chronic kidney disease [N18.32]  Yes    Chronic HFrEF (heart failure with reduced ejection fraction) [I50.22]  Yes    SSS (sick sinus syndrome) [I49.5]  Yes    Atherosclerosis of native artery of both lower extremities with intermittent claudication [I70.213]  Yes    Type 2 diabetes mellitus [E11.9]  Yes    Hypertension [I10]  Yes    Hyperlipidemia [E78.5]  Yes    Hypothyroidism (acquired) [E03.9]  Yes    S/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 Au [Z95.1]  Not Applicable    Diabetic neuropathy [E11.40]  Yes      Resolved Hospital Problems   No resolved problems to display.        Brief Hospital Course to date:  Jermaine Singh is a 78 y.o. male with history of CAD, CABG, admitted for left foot drainage.  Patient questioning whether he has infection, although per notes states he was concerned there was which prompted him to seek evaluation.     Left forefoot diabetic wound infection with secondary cellulitis  Hx of amputation left great toe and second toe (Dr. Márquez)  - per patient area of infection new since 7/21/23  - continue Rocephin and Linezolid (Vanc allergic)  - ID/cts consulted  - HOLD Eliquis for now, add full dose lovenox for history of DVT     DM2  Diabetic neuropathy  - last A1c was January, recheck added to blood in lab  - continue qam Levemir home dose, add moderate dose SSI     CKD IIIb  - stable, monitor for now    PVD  CAD s/p CABG  Chronic diastolic CHF  HTN  HL  Hypothyroid      Expected Discharge Location and Transportation: home v rehab  Expected Discharge   Expected Discharge Date: 7/26/2023; Expected Discharge Time:      DVT prophylaxis:  Medical DVT prophylaxis orders are present.          CODE STATUS:   Code Status and Medical Interventions:   Ordered at: 07/23/23 9040     Code Status (Patient has no pulse and is not breathing):    CPR (Attempt to Resuscitate)     Medical Interventions (Patient has pulse or is breathing):    Full  Support     Release to patient:    Routine Release       Marilu Walker,   07/24/23

## 2023-07-25 ENCOUNTER — APPOINTMENT (OUTPATIENT)
Dept: MRI IMAGING | Facility: HOSPITAL | Age: 78
DRG: 638 | End: 2023-07-25
Payer: MEDICARE

## 2023-07-25 ENCOUNTER — TELEPHONE (OUTPATIENT)
Dept: INTERNAL MEDICINE | Facility: CLINIC | Age: 78
End: 2023-07-25

## 2023-07-25 PROBLEM — E44.0 MODERATE MALNUTRITION: Status: ACTIVE | Noted: 2023-07-25

## 2023-07-25 LAB
ANION GAP SERPL CALCULATED.3IONS-SCNC: 12 MMOL/L (ref 5–15)
BASOPHILS # BLD AUTO: 0.04 10*3/MM3 (ref 0–0.2)
BASOPHILS NFR BLD AUTO: 0.6 % (ref 0–1.5)
BUN SERPL-MCNC: 47 MG/DL (ref 8–23)
BUN/CREAT SERPL: 29.2 (ref 7–25)
CALCIUM SPEC-SCNC: 8.4 MG/DL (ref 8.6–10.5)
CHLORIDE SERPL-SCNC: 104 MMOL/L (ref 98–107)
CO2 SERPL-SCNC: 23 MMOL/L (ref 22–29)
CREAT SERPL-MCNC: 1.61 MG/DL (ref 0.76–1.27)
DEPRECATED RDW RBC AUTO: 44.6 FL (ref 37–54)
EGFRCR SERPLBLD CKD-EPI 2021: 43.5 ML/MIN/1.73
EOSINOPHIL # BLD AUTO: 0.31 10*3/MM3 (ref 0–0.4)
EOSINOPHIL NFR BLD AUTO: 4.9 % (ref 0.3–6.2)
ERYTHROCYTE [DISTWIDTH] IN BLOOD BY AUTOMATED COUNT: 13.5 % (ref 12.3–15.4)
GLUCOSE BLDC GLUCOMTR-MCNC: 154 MG/DL (ref 70–130)
GLUCOSE BLDC GLUCOMTR-MCNC: 163 MG/DL (ref 70–130)
GLUCOSE BLDC GLUCOMTR-MCNC: 176 MG/DL (ref 70–130)
GLUCOSE BLDC GLUCOMTR-MCNC: 284 MG/DL (ref 70–130)
GLUCOSE SERPL-MCNC: 165 MG/DL (ref 65–99)
HCT VFR BLD AUTO: 34.4 % (ref 37.5–51)
HGB BLD-MCNC: 11.2 G/DL (ref 13–17.7)
IMM GRANULOCYTES # BLD AUTO: 0.02 10*3/MM3 (ref 0–0.05)
IMM GRANULOCYTES NFR BLD AUTO: 0.3 % (ref 0–0.5)
LYMPHOCYTES # BLD AUTO: 1.29 10*3/MM3 (ref 0.7–3.1)
LYMPHOCYTES NFR BLD AUTO: 20.4 % (ref 19.6–45.3)
MCH RBC QN AUTO: 29.2 PG (ref 26.6–33)
MCHC RBC AUTO-ENTMCNC: 32.6 G/DL (ref 31.5–35.7)
MCV RBC AUTO: 89.6 FL (ref 79–97)
MONOCYTES # BLD AUTO: 0.51 10*3/MM3 (ref 0.1–0.9)
MONOCYTES NFR BLD AUTO: 8.1 % (ref 5–12)
NEUTROPHILS NFR BLD AUTO: 4.15 10*3/MM3 (ref 1.7–7)
NEUTROPHILS NFR BLD AUTO: 65.7 % (ref 42.7–76)
NRBC BLD AUTO-RTO: 0 /100 WBC (ref 0–0.2)
PLATELET # BLD AUTO: 166 10*3/MM3 (ref 140–450)
PMV BLD AUTO: 12.1 FL (ref 6–12)
POTASSIUM SERPL-SCNC: 4.4 MMOL/L (ref 3.5–5.2)
RBC # BLD AUTO: 3.84 10*6/MM3 (ref 4.14–5.8)
SODIUM SERPL-SCNC: 139 MMOL/L (ref 136–145)
WBC NRBC COR # BLD: 6.32 10*3/MM3 (ref 3.4–10.8)

## 2023-07-25 PROCEDURE — 97162 PT EVAL MOD COMPLEX 30 MIN: CPT

## 2023-07-25 PROCEDURE — 99232 SBSQ HOSP IP/OBS MODERATE 35: CPT | Performed by: THORACIC SURGERY (CARDIOTHORACIC VASCULAR SURGERY)

## 2023-07-25 PROCEDURE — 82948 REAGENT STRIP/BLOOD GLUCOSE: CPT

## 2023-07-25 PROCEDURE — 85025 COMPLETE CBC W/AUTO DIFF WBC: CPT | Performed by: FAMILY MEDICINE

## 2023-07-25 PROCEDURE — 73718 MRI LOWER EXTREMITY W/O DYE: CPT

## 2023-07-25 PROCEDURE — 63710000001 INSULIN LISPRO (HUMAN) PER 5 UNITS: Performed by: FAMILY MEDICINE

## 2023-07-25 PROCEDURE — 25010000002 LINEZOLID 600 MG/300ML SOLUTION: Performed by: FAMILY MEDICINE

## 2023-07-25 PROCEDURE — 25010000002 ENOXAPARIN PER 10 MG

## 2023-07-25 PROCEDURE — 25010000002 ENOXAPARIN PER 10 MG: Performed by: INTERNAL MEDICINE

## 2023-07-25 PROCEDURE — 80048 BASIC METABOLIC PNL TOTAL CA: CPT | Performed by: FAMILY MEDICINE

## 2023-07-25 PROCEDURE — 25010000002 CEFTRIAXONE PER 250 MG: Performed by: FAMILY MEDICINE

## 2023-07-25 RX ORDER — LIDOCAINE 50 MG/G
1 PATCH TOPICAL
Status: DISCONTINUED | OUTPATIENT
Start: 2023-07-25 | End: 2023-07-26 | Stop reason: HOSPADM

## 2023-07-25 RX ORDER — ENOXAPARIN SODIUM 100 MG/ML
0.7 INJECTION SUBCUTANEOUS EVERY 12 HOURS
Status: DISCONTINUED | OUTPATIENT
Start: 2023-07-25 | End: 2023-07-26 | Stop reason: HOSPADM

## 2023-07-25 RX ORDER — LINEZOLID 600 MG/1
600 TABLET, FILM COATED ORAL EVERY 12 HOURS SCHEDULED
Status: DISCONTINUED | OUTPATIENT
Start: 2023-07-25 | End: 2023-07-26 | Stop reason: HOSPADM

## 2023-07-25 RX ADMIN — INSULIN LISPRO 2 UNITS: 100 INJECTION, SOLUTION INTRAVENOUS; SUBCUTANEOUS at 22:07

## 2023-07-25 RX ADMIN — Medication 10 ML: at 22:02

## 2023-07-25 RX ADMIN — LIDOCAINE 1 PATCH: 50 PATCH CUTANEOUS at 09:00

## 2023-07-25 RX ADMIN — TAMSULOSIN HYDROCHLORIDE 0.4 MG: 0.4 CAPSULE ORAL at 08:21

## 2023-07-25 RX ADMIN — INSULIN LISPRO 6 UNITS: 100 INJECTION, SOLUTION INTRAVENOUS; SUBCUTANEOUS at 11:37

## 2023-07-25 RX ADMIN — ATORVASTATIN CALCIUM 20 MG: 20 TABLET, FILM COATED ORAL at 22:01

## 2023-07-25 RX ADMIN — SENNOSIDES AND DOCUSATE SODIUM 2 TABLET: 50; 8.6 TABLET ORAL at 08:21

## 2023-07-25 RX ADMIN — CARVEDILOL 3.12 MG: 3.12 TABLET, FILM COATED ORAL at 19:06

## 2023-07-25 RX ADMIN — NYSTATIN: 100000 POWDER TOPICAL at 08:22

## 2023-07-25 RX ADMIN — Medication 10 ML: at 08:22

## 2023-07-25 RX ADMIN — LOSARTAN POTASSIUM 100 MG: 50 TABLET, FILM COATED ORAL at 08:21

## 2023-07-25 RX ADMIN — ACETAMINOPHEN 650 MG: 325 TABLET ORAL at 08:56

## 2023-07-25 RX ADMIN — SENNOSIDES AND DOCUSATE SODIUM 2 TABLET: 50; 8.6 TABLET ORAL at 22:01

## 2023-07-25 RX ADMIN — MAGNESIUM OXIDE TAB 400 MG (241.3 MG ELEMENTAL MG) 400 MG: 400 (241.3 MG) TAB at 08:21

## 2023-07-25 RX ADMIN — ENOXAPARIN SODIUM 80 MG: 80 INJECTION SUBCUTANEOUS at 08:22

## 2023-07-25 RX ADMIN — LINEZOLID 600 MG: 600 INJECTION, SOLUTION INTRAVENOUS at 08:22

## 2023-07-25 RX ADMIN — ENOXAPARIN SODIUM 60 MG: 60 INJECTION SUBCUTANEOUS at 22:01

## 2023-07-25 RX ADMIN — BUMETANIDE 1 MG: 1 TABLET ORAL at 08:22

## 2023-07-25 RX ADMIN — SODIUM CHLORIDE 1000 MG: 900 INJECTION INTRAVENOUS at 11:37

## 2023-07-25 RX ADMIN — BUMETANIDE 1 MG: 1 TABLET ORAL at 22:01

## 2023-07-25 RX ADMIN — ASPIRIN 81 MG: 81 TABLET, CHEWABLE ORAL at 08:21

## 2023-07-25 RX ADMIN — INSULIN LISPRO 2 UNITS: 100 INJECTION, SOLUTION INTRAVENOUS; SUBCUTANEOUS at 08:22

## 2023-07-25 RX ADMIN — LINEZOLID 600 MG: 600 TABLET, FILM COATED ORAL at 22:01

## 2023-07-25 RX ADMIN — CARVEDILOL 3.12 MG: 3.12 TABLET, FILM COATED ORAL at 08:21

## 2023-07-25 RX ADMIN — NYSTATIN: 100000 POWDER TOPICAL at 22:02

## 2023-07-25 RX ADMIN — FAMOTIDINE 20 MG: 20 TABLET ORAL at 08:21

## 2023-07-25 RX ADMIN — LEVOTHYROXINE SODIUM 88 MCG: 0.09 TABLET ORAL at 05:21

## 2023-07-25 NOTE — PROGRESS NOTES
Clinical Nutrition     Nutrition Support Assessment  Reason for Visit: Identified at risk by screening criteria, Malnutrition Severity Assessment      Patient Name: Jermaine Singh  YOB: 1945  MRN: [id number]  Date of Encounter: 07/24/23 21:15 EDT  Admission date: 7/23/2023    Comments:  Pt meets criteria for non severe chronic malnutrition based on wasting.   See MSA note.    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 Au    Atherosclerosis of native artery of both lower extremities with intermittent claudication    SSS (sick sinus syndrome)    Chronic HFrEF (heart failure with reduced ejection fraction)    PVD (peripheral vascular disease)    Stage 3b chronic kidney disease    Status post amputation of great toe and second toe of left foot    PAF (paroxysmal atrial fibrillation)          PMH:   He  has a past medical history of Allergic, Arthritis, Asthma, Coronary artery disease, Diabetes mellitus (2000), Disease of thyroid gland, Elevated serum creatinine (11/15/2022), Elevated troponin (10/02/2022), Generalized weakness (11/15/2022), History of fracture as a child, Hyperlipidemia, Hypertension, Hypothyroidism, PAF (paroxysmal atrial fibrillation) (07/23/2023), Peripheral neuropathy, Peripheral vascular disease, Pleural effusion, bilateral (11/15/2022), Proximal phalanx fracture of the second digit extending into the second metatarsal joint (07/28/2022), RBBB, Right knee pain, Unstable angina (02/12/2019), and Vitamin D deficiency.    PSH:  He  has a past surgical history that includes Eye surgery (Bilateral); Leg Surgery; Tonsillectomy; Appendectomy; Colonoscopy; Laceration Repair; Cardiac catheterization (N/A, 02/14/2019); Knee  "arthroscopy; Coronary artery bypass graft (N/A, 03/22/2019); Interventional radiology procedure (N/A, 07/29/2021); Cardiac electrophysiology procedure (N/A, 05/18/2022); Foot amputation through metatarsal (Left, 08/02/2022); Finger amputation (Left, 01/17/2023); Fracture surgery; and Cardiac surgery.      Applicable Nutrition Concerns:   Skin:  Oral:  GI:      Applicable Interval History:         Reported/Observed/Food/Nutrition Related History:     Pt allows wt was taken yesterday. Rpts weighed 248 lbs 1 yr ago.       Labs    Labs Reviewed: Yes   Note pBNP  Results from last 7 days   Lab Units 07/23/23  1823   GLUCOSE mg/dL 250*   BUN mg/dL 46*   CREATININE mg/dL 1.65*   SODIUM mmol/L 142   CHLORIDE mmol/L 105   POTASSIUM mmol/L 4.8   ALT (SGPT) U/L 28       Results from last 7 days   Lab Units 07/23/23  1823   ALBUMIN g/dL 3.4*   CRP mg/dL 3.60*       Results from last 7 days   Lab Units 07/24/23  1945/23  1607 07/24/23  1109 07/24/23  0725 07/23/23  2255   GLUCOSE mg/dL 220* 145* 206* 190* 285*     Lab Results   Lab Value Date/Time    HGBA1C 8.70 (H) 07/23/2023 2235    HGBA1C 8.20 (H) 01/16/2023 2128         Results from last 7 days   Lab Units 07/23/23  1823   PROBNP pg/mL 33,171.0*         Medications    Medications Reviewed: Yes  Pertinent: bumex, abx, pepcid, insulin, synthroid, Mag Ox, pericolace  Infusion:  PRN:       Intake/Ouptut 24 hrs (0701 - 0700)   I&O's Reviewed: Yes     Intake & Output (last day)         07/24 0701 07/25 0700    P.O. 1080    Total Intake(mL/kg) 1080 (13.3)    Urine (mL/kg/hr) 500 (0.4)    Total Output 500    Net +580               No stool recorded.    Anthropometrics     Flowsheet Rows      Flowsheet Row First Filed Value   Admission Height 190.5 cm (75\") Documented at 07/23/2023 1619   Admission Weight 80.3 kg (177 lb) Documented at 07/23/2023 1619     Height: Height: 190.5 cm (75\")  Last Filed Weight: Weight: 81.3 kg (179 lb 3.2 oz) (07/23/23 2015)  Method: Weight Method: " Stated  BMI: BMI (Calculated): 22.4  BMI classification: Normal: 18.5-24.9kg/m2    UBW: Dec 2022 238 lbs, Feb 22, 2023 195 lbs, May 24, 2023 187 lbs, July 23, 2023 179 lbs  Weight change: bt Feb and July 2023 loss of 23 lbs 11% body wt   - ? contribution of fluid status.    Nutrition Focused Physical Exam     Date: 7/24    Patient meets criteria for non severe chronic malnutrition diagnosis, see MSA note.    Current Nutrition Prescription     PO: Diet: Diabetic Diets; Consistent Carbohydrate; Texture: Regular Texture (IDDSI 7); Fluid Consistency: Thin (IDDSI 0)  NPO Diet NPO Type: Sips with Meds  Oral Nutrition Supplement:   Intake: 1 Day: 75% x 3 meals.      Nutrition Diagnosis   Date:  Updated:   Problem Malnutrition non severe chronic   Etiology Some of wt loss as lean mass not recovered    Signs/Symptoms wasting   Status:       Goal:   General: Nutrition to support treatment  PO: Maintain intake  EN/PN: N/A    Nutrition Intervention      Follow treatment progress, Care plan reviewed, Advise alternate selection, Menu provided        Monitoring/Evaluation:   Per protocol, I&O, PO intake, Pertinent labs, Weight, Skin status, Symptoms      Lilia Palmer RD  Time Spent: 30 min

## 2023-07-25 NOTE — PROGRESS NOTES
CTS Progress Note       LOS: 2 days   Patient Care Team:  Marysol Shrestha MD as PCP - General (Internal Medicine)  Jermaine Hernandez MD as Consulting Physician (Cardiology)    Chief Complaint: Diabetic foot infection    Vital Signs:  Temp:  [97.5 °F (36.4 °C)-98.4 °F (36.9 °C)] 98.2 °F (36.8 °C)  Heart Rate:  [60-86] 60  Resp:  [16-18] 18  BP: (100-121)/(63-80) 106/67    Physical Exam: No change in left foot       Results:     Results from last 7 days   Lab Units 07/25/23  0525   WBC 10*3/mm3 6.32   HEMOGLOBIN g/dL 11.2*   HEMATOCRIT % 34.4*   PLATELETS 10*3/mm3 166     Results from last 7 days   Lab Units 07/23/23  1823   SODIUM mmol/L 142   POTASSIUM mmol/L 4.8   CHLORIDE mmol/L 105   CO2 mmol/L 25.0   BUN mg/dL 46*   CREATININE mg/dL 1.65*   GLUCOSE mg/dL 250*   CALCIUM mg/dL 8.8           Imaging Results (Last 24 Hours)       ** No results found for the last 24 hours. 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 Au    Atherosclerosis of native artery of both lower extremities with intermittent claudication    SSS (sick sinus syndrome)    Chronic HFrEF (heart failure with reduced ejection fraction)    PVD (peripheral vascular disease)    Stage 3b chronic kidney disease    Status post amputation of great toe and second toe of left foot    PAF (paroxysmal atrial fibrillation)    As per my summary and the plan yesterday.  There will be no need to perform an aortogram of the left lower extremity today as I had located a formal arteriographic study performed by Dr. Hernandez demonstrating critical trifurcation disease with minimal reconstitution.  Currently the patient's cellulitis and erythema seem to be improving clinically.  There is no further revascularization that would offer any benefit at the level of the ankle.  If indeed further amputation is  required this patient would probably require a below-knee amputation and I explained that to him.  At this point he wants to continue with antibiotics as long as possible in hopes to avoid further amputation.    Plan   As per the above patient may eat with no surgical intervention scheduled today    Please note that portions of this note were completed with a voice recognition program. Efforts were made to edit the dictations, but occasionally words are mistranscribed.    Ap Au MD  07/25/23  06:58 EDT

## 2023-07-25 NOTE — PLAN OF CARE
Goal Outcome Evaluation:  Plan of Care Reviewed With: patient        Progress: no change  Outcome Evaluation: PT initial eval completed. Pt presents below his functional baseline with decreased strength, mild balance deficits, and impaired endurance. Ambulation of 350' with CGA and RW was well tolerated. Pt will benefit from further IPPT for addressing deficits. PT rec d/c home with assist and OPPT when medically appropriate.      Anticipated Discharge Disposition (PT): home with assist, home with outpatient therapy services

## 2023-07-25 NOTE — PROGRESS NOTES
Jermaine Singh  1945  S352/1      Patient seen as courtesy consultation.  Patient sitting on bedside eating breakfast at the time - spirits are quite good, he is positive about things and reports he's doing well.  We have reviewed Dr. Au's notes - no plans for invasive procedures/surgery at this time.    MRI form for his device is on the chart.  He has an MRI conditional device, he is dependent.    We will continue to follow from periphery.    Vitals:    07/25/23 0821   BP: 129/90   Pulse: 65   Resp:    Temp:    SpO2:      Labs reviewed.    Megan Shukla PA-C  Electronically signed by RANULFO Mejia, 07/25/23, 10:52 AM EDT.

## 2023-07-25 NOTE — PLAN OF CARE
Problem: Asthma Comorbidity  Goal: Maintenance of Asthma Control  Outcome: Ongoing, Progressing  Intervention: Maintain Asthma Symptom Control  Recent Flowsheet Documentation  Taken 7/25/2023 0400 by Guerline Londono RN  Medication Review/Management: medications reviewed  Taken 7/25/2023 0000 by Guerline Londono RN  Medication Review/Management: medications reviewed  Taken 7/24/2023 2000 by Guerline Londoon RN  Medication Review/Management: medications reviewed     Problem: Behavioral Health Comorbidity  Goal: Maintenance of Behavioral Health Symptom Control  Outcome: Ongoing, Progressing  Intervention: Maintain Behavioral Health Symptom Control  Recent Flowsheet Documentation  Taken 7/25/2023 0400 by Guerline Londono RN  Medication Review/Management: medications reviewed  Taken 7/25/2023 0000 by Guerline Londono RN  Medication Review/Management: medications reviewed  Taken 7/24/2023 2000 by Guerline Londono RN  Medication Review/Management: medications reviewed     Problem: COPD (Chronic Obstructive Pulmonary Disease) Comorbidity  Goal: Maintenance of COPD Symptom Control  Outcome: Ongoing, Progressing  Intervention: Maintain COPD-Symptom Control  Recent Flowsheet Documentation  Taken 7/25/2023 0400 by Guerline Londono RN  Medication Review/Management: medications reviewed  Taken 7/25/2023 0000 by Guerline Londono RN  Medication Review/Management: medications reviewed  Taken 7/24/2023 2000 by Guerline Londono RN  Medication Review/Management: medications reviewed     Problem: Diabetes Comorbidity  Goal: Blood Glucose Level Within Targeted Range  Outcome: Ongoing, Progressing     Problem: Heart Failure Comorbidity  Goal: Maintenance of Heart Failure Symptom Control  Outcome: Ongoing, Progressing  Intervention: Maintain Heart Failure-Management  Recent Flowsheet Documentation  Taken 7/25/2023 0400 by Guerline Londono RN  Medication Review/Management: medications reviewed  Taken  7/25/2023 0000 by Guerline Londono RN  Medication Review/Management: medications reviewed  Taken 7/24/2023 2000 by Guerline Londono RN  Medication Review/Management: medications reviewed     Problem: Hypertension Comorbidity  Goal: Blood Pressure in Desired Range  Outcome: Ongoing, Progressing  Intervention: Maintain Blood Pressure Management  Recent Flowsheet Documentation  Taken 7/25/2023 0400 by Guerline Londono RN  Medication Review/Management: medications reviewed  Taken 7/25/2023 0000 by Guerline Londono RN  Medication Review/Management: medications reviewed  Taken 7/24/2023 2000 by Guerline Londono RN  Medication Review/Management: medications reviewed     Problem: Obstructive Sleep Apnea Risk or Actual Comorbidity Management  Goal: Unobstructed Breathing During Sleep  Outcome: Ongoing, Progressing     Problem: Osteoarthritis Comorbidity  Goal: Maintenance of Osteoarthritis Symptom Control  Outcome: Ongoing, Progressing  Intervention: Maintain Osteoarthritis Symptom Control  Recent Flowsheet Documentation  Taken 7/25/2023 0400 by Guerline Londono RN  Medication Review/Management: medications reviewed  Taken 7/25/2023 0000 by Guerline Londono RN  Medication Review/Management: medications reviewed  Taken 7/24/2023 2000 by Guerline Londono RN  Medication Review/Management: medications reviewed     Problem: Pain Chronic (Persistent) (Comorbidity Management)  Goal: Acceptable Pain Control and Functional Ability  Outcome: Ongoing, Progressing  Intervention: Manage Persistent Pain  Recent Flowsheet Documentation  Taken 7/25/2023 0400 by Guerline Londono RN  Medication Review/Management: medications reviewed  Taken 7/25/2023 0000 by Guerline Londono RN  Medication Review/Management: medications reviewed  Taken 7/24/2023 2000 by Guerline Londono RN  Medication Review/Management: medications reviewed  Intervention: Optimize Psychosocial Wellbeing  Recent Flowsheet Documentation  Taken  7/24/2023 2000 by Guerline Londono RN  Diversional Activities: television  Family/Support System Care:   self-care encouraged   support provided     Problem: Seizure Disorder Comorbidity  Goal: Maintenance of Seizure Control  Outcome: Ongoing, Progressing     Problem: Skin Injury Risk Increased  Goal: Skin Health and Integrity  Outcome: Ongoing, Progressing  Intervention: Optimize Skin Protection  Recent Flowsheet Documentation  Taken 7/25/2023 0400 by Guerline Londono RN  Pressure Reduction Techniques:   frequent weight shift encouraged   heels elevated off bed  Head of Bed (HOB) Positioning: HOB at 20-30 degrees  Pressure Reduction Devices:   pressure-redistributing mattress utilized   positioning supports utilized  Skin Protection:   adhesive use limited   incontinence pads utilized   drying agents applied   skin sealant/moisture barrier applied   transparent dressing maintained  Taken 7/25/2023 0000 by Guerline Londono RN  Pressure Reduction Techniques: frequent weight shift encouraged  Head of Bed (HOB) Positioning: HOB at 20-30 degrees  Pressure Reduction Devices:   pressure-redistributing mattress utilized   positioning supports utilized  Skin Protection:   adhesive use limited   incontinence pads utilized   transparent dressing maintained   skin sealant/moisture barrier applied  Taken 7/24/2023 2000 by Guerline Londono RN  Pressure Reduction Techniques:   frequent weight shift encouraged   heels elevated off bed  Head of Bed (HOB) Positioning: HOB at 20-30 degrees  Pressure Reduction Devices:   pressure-redistributing mattress utilized   positioning supports utilized  Skin Protection:   adhesive use limited   drying agents applied   incontinence pads utilized   skin sealant/moisture barrier applied   transparent dressing maintained     Problem: Pain Acute  Goal: Acceptable Pain Control and Functional Ability  Outcome: Ongoing, Progressing  Intervention: Prevent or Manage Pain  Recent Flowsheet  Documentation  Taken 7/25/2023 0400 by Guerline Londono RN  Medication Review/Management: medications reviewed  Taken 7/25/2023 0000 by Guerline Londono RN  Medication Review/Management: medications reviewed  Taken 7/24/2023 2000 by Guerline Londono RN  Medication Review/Management: medications reviewed  Intervention: Optimize Psychosocial Wellbeing  Recent Flowsheet Documentation  Taken 7/24/2023 2000 by Guerline Londono RN  Diversional Activities: television     Problem: Adult Inpatient Plan of Care  Goal: Plan of Care Review  Outcome: Ongoing, Progressing  Goal: Patient-Specific Goal (Individualized)  Outcome: Ongoing, Progressing  Goal: Absence of Hospital-Acquired Illness or Injury  Outcome: Ongoing, Progressing  Intervention: Identify and Manage Fall Risk  Recent Flowsheet Documentation  Taken 7/25/2023 0400 by Guerline Londono RN  Safety Promotion/Fall Prevention:   activity supervised   assistive device/personal items within reach   clutter free environment maintained   fall prevention program maintained   nonskid shoes/slippers when out of bed   room organization consistent   safety round/check completed  Taken 7/25/2023 0000 by Guerline Londono RN  Safety Promotion/Fall Prevention:   activity supervised   assistive device/personal items within reach   clutter free environment maintained   fall prevention program maintained   nonskid shoes/slippers when out of bed   room organization consistent   safety round/check completed  Taken 7/24/2023 2000 by Guerline Londono RN  Safety Promotion/Fall Prevention:   activity supervised   assistive device/personal items within reach   clutter free environment maintained   fall prevention program maintained   nonskid shoes/slippers when out of bed   room organization consistent   safety round/check completed  Intervention: Prevent Skin Injury  Recent Flowsheet Documentation  Taken 7/25/2023 0400 by Guerline Londono RN  Body Position:   position  changed independently   neutral body alignment   neutral head position  Skin Protection:   adhesive use limited   incontinence pads utilized   drying agents applied   skin sealant/moisture barrier applied   transparent dressing maintained  Taken 7/25/2023 0000 by Guerline Londono RN  Body Position:   position changed independently   neutral head position   neutral body alignment  Skin Protection:   adhesive use limited   incontinence pads utilized   transparent dressing maintained   skin sealant/moisture barrier applied  Taken 7/24/2023 2000 by Guerline Londono RN  Body Position:   position changed independently   neutral head position   neutral body alignment   legs elevated  Skin Protection:   adhesive use limited   drying agents applied   incontinence pads utilized   skin sealant/moisture barrier applied   transparent dressing maintained  Intervention: Prevent and Manage VTE (Venous Thromboembolism) Risk  Recent Flowsheet Documentation  Taken 7/25/2023 0400 by Guerline Londono RN  VTE Prevention/Management: (lovenox subq)   bilateral   sequential compression devices off   patient refused intervention   other (see comments)  Taken 7/25/2023 0000 by Guerline Londono RN  VTE Prevention/Management: (lovenox subq) other (see comments)  Taken 7/24/2023 2000 by Guerline Londono RN  VTE Prevention/Management: (lovenox subq) other (see comments)  Intervention: Prevent Infection  Recent Flowsheet Documentation  Taken 7/25/2023 0400 by Guerline Londono RN  Infection Prevention:   hand hygiene promoted   rest/sleep promoted   single patient room provided  Taken 7/25/2023 0000 by Guerline Londono RN  Infection Prevention:   hand hygiene promoted   rest/sleep promoted   single patient room provided  Taken 7/24/2023 2000 by Guerline Londono RN  Infection Prevention:   hand hygiene promoted   single patient room provided   rest/sleep promoted  Goal: Optimal Comfort and Wellbeing  Outcome: Ongoing,  Progressing  Intervention: Provide Person-Centered Care  Recent Flowsheet Documentation  Taken 7/24/2023 2000 by Guerline Londono RN  Trust Relationship/Rapport:   care explained   choices provided   questions encouraged   reassurance provided   thoughts/feelings acknowledged  Goal: Readiness for Transition of Care  Outcome: Ongoing, Progressing     Problem: Fall Injury Risk  Goal: Absence of Fall and Fall-Related Injury  Outcome: Ongoing, Progressing  Intervention: Identify and Manage Contributors  Recent Flowsheet Documentation  Taken 7/25/2023 0400 by Guerline Londono RN  Medication Review/Management: medications reviewed  Taken 7/25/2023 0000 by Guerline Londono, RN  Medication Review/Management: medications reviewed  Taken 7/24/2023 2000 by Guerline Londono RN  Medication Review/Management: medications reviewed  Intervention: Promote Injury-Free Environment  Recent Flowsheet Documentation  Taken 7/25/2023 0400 by Guerline Londono RN  Safety Promotion/Fall Prevention:   activity supervised   assistive device/personal items within reach   clutter free environment maintained   fall prevention program maintained   nonskid shoes/slippers when out of bed   room organization consistent   safety round/check completed  Taken 7/25/2023 0000 by Guerline Londono, RN  Safety Promotion/Fall Prevention:   activity supervised   assistive device/personal items within reach   clutter free environment maintained   fall prevention program maintained   nonskid shoes/slippers when out of bed   room organization consistent   safety round/check completed  Taken 7/24/2023 2000 by Guerline Londono, RN  Safety Promotion/Fall Prevention:   activity supervised   assistive device/personal items within reach   clutter free environment maintained   fall prevention program maintained   nonskid shoes/slippers when out of bed   room organization consistent   safety round/check completed   Goal Outcome Evaluation:

## 2023-07-25 NOTE — PROGRESS NOTES
[facility] Medicine Services  PROGRESS NOTE    Patient Name: Jermaine Singh  : 1945  MRN: [id number]    Date of Admission: 2023  Primary Care Physician: Marysol Shrestha MD    Subjective   Subjective     CC:  Cellulitis foot    HPI:  Patient reports back pain, worse from being in bed. Hoping to be as ambulatory as possible today. Declines pain meds - open to lidocaine patch  Overall - feels redness is much improved on foot  No shortness of breath, feeling otherwise well    Objective   Objective     Vital Signs:   Temp:  [97.5 °F (36.4 °C)-98.2 °F (36.8 °C)] 97.9 °F (36.6 °C)  Heart Rate:  [60-86] 73  Resp:  [16-18] 18  BP: (100-129)/(63-90) 115/78     Physical Exam:  Constitutional: No acute distress, awake, alert  HENT: NCAT, mucous membranes moist  Respiratory: Clear to auscultation bilaterally, respiratory effort normal on room air  Cardiovascular: RRR, no murmurs, rubs, or gallops  Gastrointestinal: Soft, nontender, nondistended  Musculoskeletal: Left foot s/p amputation of mult toes, redness on top of foot with mild swelling. Otherwise exam limited by wrapping but no erythema appreciated up into leg  Psychiatric: Flat affect, cooperative  Neurologic: Alert and oriented, facial movements symmetric and spontaneous movement of all 4 extremities grossly equal bilaterally, speech clear  Skin: No rashes    Results Reviewed:  LAB RESULTS:      Lab 23  0525 23  1746   WBC 6.32  --  10.88*   HEMOGLOBIN 11.2*  --  12.2*   HEMATOCRIT 34.4*  --  39.0   PLATELETS 166  --  179   NEUTROS ABS 4.15  --  8.67*   IMMATURE GRANS (ABS) 0.02  --  0.03   LYMPHS ABS 1.29  --  0.97   MONOS ABS 0.51  --  0.86   EOS ABS 0.31  --  0.30   MCV 89.6  --  91.5   SED RATE  --   --  30*   CRP  --  3.60*  --    LACTATE  --   --  1.6         Lab 23  0525 235 23  1823   SODIUM 139  --  142   POTASSIUM 4.4  --  4.8   CHLORIDE 104  --  105   CO2 23.0  --  25.0    ANION GAP 12.0  --  12.0   BUN 47*  --  46*   CREATININE 1.61*  --  1.65*   EGFR 43.5*  --  42.2*   GLUCOSE 165*  --  250*   CALCIUM 8.4*  --  8.8   HEMOGLOBIN A1C  --  8.70*  --          Lab 07/23/23 1823   TOTAL PROTEIN 6.4   ALBUMIN 3.4*   GLOBULIN 3.0   ALT (SGPT) 28   AST (SGOT) 25   BILIRUBIN 0.5   ALK PHOS 114   LIPASE 11*         Lab 07/23/23 2020 07/23/23 1823   PROBNP  --  33,171.0*   HSTROP T 104* 103*                 Brief Urine Lab Results  (Last result in the past 365 days)        Color   Clarity   Blood   Leuk Est   Nitrite   Protein   CREAT   Urine HCG        01/21/23 0907 Yellow   Turbid   Large (3+)   Large (3+)   Negative   100 mg/dL (2+)                   Microbiology Results Abnormal       Procedure Component Value - Date/Time    Blood Culture - Blood, Hand, Right [[id number]]  (Normal) Collected: 07/23/23 1825    Lab Status: Preliminary result Specimen: Blood from Hand, Right Updated: 07/24/23 1915     Blood Culture No growth at 24 hours    Blood Culture - Blood, Arm, Right [[id number]]  (Normal) Collected: 07/23/23 1745    Lab Status: Preliminary result Specimen: Blood from Arm, Right Updated: 07/24/23 1800     Blood Culture No growth at 24 hours            XR Foot 3+ View Left    Result Date: 7/23/2023  XR FOOT 3+ VW LEFT Date of Exam: 7/23/2023 5:03 PM EDT Indication: Left foot swelling, redness, and blistering, previous partial forefoot amputation. Comparison: Left foot x-ray performed on 7/27/2022 and a left toe x-ray from 1/16/2023. Findings: The patient is status post amputation of the left great toe and the distal first metatarsal bone. The patient's had interval amputation of most of the left second toe. The base of the proximal phalanx remains. There is no cortical destruction or suspicious periostitis to indicate radiographic changes of osteomyelitis. There is mature periosteal reaction along the shafts of the second through fourth metatarsal bones which can be seen with venous  stasis. There is mild spurring within the midfoot consistent with arthritis. There is a prominent calcaneal enthesophyte. There are scattered vascular calcifications.     Impression: Impression: 1. Status post amputation procedures of the left foot as described. 2. No radiographic changes of osteomyelitis. 3. Additional incidental findings as noted above. Electronically Signed: Pan Sharp  7/23/2023 5:22 PM EDT  Workstation ID: [id number]    XR Chest 1 View    Result Date: 7/23/2023  XR CHEST 1 VW Date of Exam: 7/23/2023 4:47 PM EDT Indication: Chest Pain Triage Protocol Comparison: 1/16/2023 Findings: Patient is status post median sternotomy and CABG. Left chest wall pacemaker is present. Cardiac silhouette is enlarged. No consolidation or mass is seen. No pleural effusion or pneumothorax is seen. No acute osseous lesion is seen.     Impression: Impression: 1.Cardiomegaly. No acute radiographic abnormality is identified. Electronically Signed: Lucio Lanier  7/23/2023 4:54 PM EDT  Workstation ID: [id number]     Results for orders placed during the hospital encounter of 07/27/22    Adult Transthoracic Echo Complete W/ Cont if Necessary Per Protocol    Interpretation Summary  · Left ventricular ejection fraction appears to be 46 - 50%. Left ventricular systolic function is low normal.  · Left ventricular septal hypokinesis  · No significant valvular heart disease      Current medications:  Scheduled Meds:apixaban, 5 mg, Oral, Q12H  aspirin, 81 mg, Oral, Daily  atorvastatin, 20 mg, Oral, Nightly  bumetanide, 1 mg, Oral, BID  carvedilol, 3.125 mg, Oral, BID With Meals  cefTRIAXone, 1,000 mg, Intravenous, Q24H  famotidine, 20 mg, Oral, Daily  insulin detemir, 8 Units, Subcutaneous, Nightly  insulin lispro, 2-9 Units, Subcutaneous, 4x Daily AC & at Bedtime  levothyroxine, 88 mcg, Oral, Q AM  lidocaine, 1 patch, Transdermal, Q24H  Linezolid, 600 mg, Intravenous, Q12H  losartan, 100 mg, Oral, Daily  magnesium oxide, 400 mg,  Oral, Daily  nystatin, , Topical, BID  senna-docusate sodium, 2 tablet, Oral, BID  sodium chloride, 10 mL, Intravenous, Q12H  tamsulosin, 0.4 mg, Oral, Daily      Continuous Infusions:   PRN Meds:.  acetaminophen    senna-docusate sodium **AND** polyethylene glycol **AND** bisacodyl **AND** bisacodyl    calcium carbonate    dextrose    dextrose    glucagon (human recombinant)    HYDROcodone-acetaminophen    Morphine **AND** naloxone    nitroglycerin    ondansetron **OR** ondansetron    QUEtiapine    sodium chloride    sodium chloride    sodium chloride    Assessment & Plan   Assessment & Plan     Active Hospital Problems    Diagnosis  POA    **Diabetic foot infection [E11.628, L08.9]  Yes    Moderate malnutrition [E44.0]  Yes    Status post amputation of great toe and second toe of left foot [Z89.422]  Not Applicable    PAF (paroxysmal atrial fibrillation) [I48.0]  Yes    PVD (peripheral vascular disease) [I73.9]  Yes    Stage 3b chronic kidney disease [N18.32]  Yes    Chronic HFrEF (heart failure with reduced ejection fraction) [I50.22]  Yes    SSS (sick sinus syndrome) [I49.5]  Yes    Atherosclerosis of native artery of both lower extremities with intermittent claudication [I70.213]  Yes    Type 2 diabetes mellitus [E11.9]  Yes    Hypertension [I10]  Yes    Hyperlipidemia [E78.5]  Yes    Hypothyroidism (acquired) [E03.9]  Yes    S/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 Au [Z95.1]  Not Applicable    Diabetic neuropathy [E11.40]  Yes      Resolved Hospital Problems   No resolved problems to display.        Brief Hospital Course to date:  Jermaine Singh is a 78 y.o. male w h/o CAD s/p CABG, DMII, s/p pacemaker, multiple toe amputations right foot, unable to re-vascularize severe PAD who presents with left foot infection.    Left forefoot DMII-related cellulitis - improved  H/o left great toe and 2nd toe amputation  PAD  Vancomycin allergy  -continue rocephin + zyvox, likely IV abx in clinic w ID at discharge  -d/w CT  surgery: no need for emergent intervention, would require below-knee amputation if worsening despite abx but appears to be improving  -MRI pending (pacemaker form in per cardiology)    DMII c/b neuropathy -running high in day, split detemir to BID dosing  CKDIIIb - at baseline  Paroxysmal Afib - lovenox while surgery planning pending, return to eliquis when MRI results if no surgery needed  CAD s/p CABG  SSS s/p pacemaker placement  CHFpEF without exacerbation  HTN  Hypothyroidism    Consult PT - no activity restriction present on review of old notes from what I can tell. Patient asking about boot he had used in past    Expected Discharge Location and Transportation: home w IV abx in ID clinic  Expected Discharge 7/26 if MRI OK  Expected Discharge Date: 7/26/2023; Expected Discharge Time:      DVT prophylaxis:  Medical DVT prophylaxis orders are present.          CODE STATUS:   Code Status and Medical Interventions:   Ordered at: 07/23/23 2214     Code Status (Patient has no pulse and is not breathing):    CPR (Attempt to Resuscitate)     Medical Interventions (Patient has pulse or is breathing):    Full Support     Release to patient:    Routine Release       Angelica Blackwood MD  07/25/23

## 2023-07-25 NOTE — PROGRESS NOTES
INFECTIOUS DISEASE Progress note    Jermaine Singh  1945  [id number]      Admission Date: 7/23/2023      Requesting Provider: No ref. provider found  Evaluating Physician: Lincoln Padilla MD    Reason for Consultation: Left diabetic foot infection    History of present illness:    7/24/2023: Patient is a 78 y.o. male History of type 2 diabetes mellitus, peripheral neuropathy, Stage III chronic kidney disease, bilateral DVTs on Eliquis therapy, Paroxysmal atrial fibrillation, sick sinus syndrome status post pacemaker placement, CHF, CAD status post CABG in 2019 and peripheral vascular disease who is seen today for evaluation of left foot infection after undergoing prior left hallux and second toe amputations. He underwent left hallux Amputation on 8/2/22 and then underwent a left second toe amputation on 1/17/23. Cultures from 1/16/23 and 1/17/23 grew coagulase-negative staph. He has noted a worsening left forefoot infection over 2 days prior to admission and then presented to the emergency room yesterday. Laboratory studies revealed a white blood cell count of 10.9, C-reactive protein of 3.6, creatinine of 1.65, troponin of 104, And BNP of 33,171. He has remained afebrile since admission. He has a history of vancomycin allergy consisting of renal failure.  He was started on intravenous ceftriaxone along with Zyvox. He indicates that his left foot erythema has dramatically improved today.    7/25/2023: He has noted decreased left forefoot erythema and swelling.  He has remained afebrile.  MRI scan of the left foot is pending.  His white blood cell count today is 6.3.  His creatinine is 1.6.  He denies nausea and vomiting.  His blood cultures remain negative.    Past Medical History:   Diagnosis Date    Allergic     Arthritis     Asthma     Coronary artery disease     Diabetes mellitus 2000    started on inuslin 12/2018; started on po meds in 2000; checking blood sugars daily     Disease of thyroid  gland     po meds daily for hypothyroidism     Elevated serum creatinine 11/15/2022    Elevated troponin 10/02/2022    Generalized weakness 11/15/2022    History of fracture as a child     rt leg- severe     Hyperlipidemia     Hypertension     Hypothyroidism     PAF (paroxysmal atrial fibrillation) 07/23/2023    Peripheral neuropathy     Peripheral vascular disease     s/p angiogram 2/19-needs stent in left leg     Pleural effusion, bilateral 11/15/2022    Proximal phalanx fracture of the second digit extending into the second metatarsal joint 07/28/2022    RBBB     Right knee pain     Unstable angina 02/12/2019    Added automatically from request for surgery [id number]    Vitamin D deficiency        Past Surgical History:   Procedure Laterality Date    AMPUTATION DIGIT Left 01/17/2023    Procedure: AMPUTATION DIGIT LEFT;  Surgeon: Gopal Márquez MD;  Location:  [facility];  Service: Vascular;  Laterality: Left;    APPENDECTOMY      CARDIAC CATHETERIZATION N/A 02/14/2019    Procedure: Left Heart Cath;  Surgeon: Cooper Apodaca MD;  Location: [facility] CATH INVASIVE LOCATION;  Service: Cardiology    CARDIAC ELECTROPHYSIOLOGY PROCEDURE N/A 05/18/2022    Procedure: DEVICE IMPLANT;  Surgeon: Cooper Apodaca MD;  Location: [facility] CATH INVASIVE LOCATION;  Service: Cardiology;  Laterality: N/A;    CARDIAC SURGERY      COLONOSCOPY      CORONARY ARTERY BYPASS GRAFT N/A 03/22/2019    Procedure: MEDIAN STERNOTOMY, CORONARY ARTERY BYPASS GRAFT X3, UTILIZING THE LEFT INTERNAL MAMMARY ARTERY, EVH AND OPEN HARVEST OF THE RIGHT GREATER SAPHENOUS VEIN, EXPLORATION OF THE LEFT LEG;  Surgeon: Ap Au MD;  Location: [facility] OR;  Service: Cardiothoracic    EYE SURGERY Bilateral     cataracts     FRACTURE SURGERY      INTERVENTIONAL RADIOLOGY PROCEDURE N/A 07/29/2021    Procedure: Abdominal Aortagram with Runoff;  Surgeon: Jermaine Hernandez MD;  Location: [facility] CATH INVASIVE LOCATION;  Service: Cardiovascular;   Laterality: N/A;    KNEE ARTHROSCOPY      right x 2, left x 1    LACERATION REPAIR      right leg    LEG SURGERY      2 for fracture of rt leg     TONSILLECTOMY      Adnoidectomy    TRANS METATARSAL AMPUTATION Left 08/02/2022    Procedure: GREAT TOE AMPUTATION LEFT;  Surgeon: Gopal Márquez MD;  Location: [facility];  Service: Vascular;  Laterality: Left;       Family History   Problem Relation Age of Onset    Coronary artery disease Mother     Diabetes Mother     Hyperlipidemia Mother     Cancer Father        Social History     Socioeconomic History    Marital status:     Number of children: 2   Tobacco Use    Smoking status: Never     Passive exposure: Past    Smokeless tobacco: Never   Vaping Use    Vaping Use: Never used   Substance and Sexual Activity    Alcohol use: Not Currently     Comment: every 2-3 months    Drug use: No    Sexual activity: Defer       Allergies   Allergen Reactions    Vancomycin Other (See Comments)     Kidney failure per last admission         Medication:    Current Facility-Administered Medications:     acetaminophen (TYLENOL) tablet 650 mg, 650 mg, Oral, Q4H PRN, Fariba Olmos MD, 650 mg at 07/25/23 0856    aspirin chewable tablet 81 mg, 81 mg, Oral, Daily, Fariba Olmos MD, 81 mg at 07/25/23 0821    atorvastatin (LIPITOR) tablet 20 mg, 20 mg, Oral, Nightly, Fariba Olmos MD, 20 mg at 07/24/23 2117    sennosides-docusate (PERICOLACE) 8.6-50 MG per tablet 2 tablet, 2 tablet, Oral, BID, 2 tablet at 07/25/23 0821 **AND** polyethylene glycol (MIRALAX) packet 17 g, 17 g, Oral, Daily PRN, 17 g at 07/24/23 0829 **AND** bisacodyl (DULCOLAX) EC tablet 5 mg, 5 mg, Oral, Daily PRN **AND** bisacodyl (DULCOLAX) suppository 10 mg, 10 mg, Rectal, Daily PRN, Fariba Olmos MD    bumetanide (BUMEX) tablet 1 mg, 1 mg, Oral, BID, Fariba Olmos MD, 1 mg at 07/25/23 0822    calcium carbonate (TUMS) chewable tablet 500 mg (200 mg elemental), 2 tablet, Oral, BID PRN, Fariba Olmos MD    carvedilol (COREG)  tablet 3.125 mg, 3.125 mg, Oral, BID With Meals, Fariba Olmos MD, 3.125 mg at 07/25/23 0821    cefTRIAXone (ROCEPHIN) 1000 mg/100 mL 0.9% NS (MBP), 1,000 mg, Intravenous, Q24H, Fariba Olmos MD, 1,000 mg at 07/25/23 1137    dextrose (D50W) (25 g/50 mL) IV injection 25 g, 25 g, Intravenous, Q15 Min PRN, Fariba Olmos MD    dextrose (GLUTOSE) oral gel 15 g, 15 g, Oral, Q15 Min PRN, Fariba Olmos MD    Enoxaparin Sodium (LOVENOX) syringe 60 mg, 0.7 mg/kg, Subcutaneous, Q12H, Jermaine Ceballos IV, PharmD    famotidine (PEPCID) tablet 20 mg, 20 mg, Oral, Daily, Fariba Olmos MD, 20 mg at 07/25/23 0821    glucagon (GLUCAGEN) injection 1 mg, 1 mg, Intramuscular, Q15 Min PRN, Fariba Olmos MD    HYDROcodone-acetaminophen (NORCO) 5-325 MG per tablet 1 tablet, 1 tablet, Oral, Q4H PRN, Fariba Olmos MD    insulin detemir (LEVEMIR) injection 5 Units, 5 Units, Subcutaneous, Q12H, Angelica Blackwood MD    Insulin Lispro (humaLOG) injection 2-9 Units, 2-9 Units, Subcutaneous, 4x Daily AC & at Bedtime, Fariba Olmos MD, 6 Units at 07/25/23 1137    levothyroxine (SYNTHROID, LEVOTHROID) tablet 88 mcg, 88 mcg, Oral, Q AM, Fariba Olmos MD, 88 mcg at 07/25/23 0521    lidocaine (LIDODERM) 5 % 1 patch, 1 patch, Transdermal, Q24H, Angelica Blackwood MD, 1 patch at 07/25/23 0900    Linezolid (ZYVOX) 600 mg 300 mL, 600 mg, Intravenous, Q12H, Fariba Olmos MD, Last Rate: 300 mL/hr at 07/25/23 0822, 600 mg at 07/25/23 0822    losartan (COZAAR) tablet 100 mg, 100 mg, Oral, Daily, Fariba Olmos MD, 100 mg at 07/25/23 0821    magnesium oxide (MAG-OX) tablet 400 mg, 400 mg, Oral, Daily, Fariba Olmos MD, 400 mg at 07/25/23 0821    morphine injection 1 mg, 1 mg, Intravenous, Q4H PRN **AND** naloxone (NARCAN) injection 0.4 mg, 0.4 mg, Intravenous, Q5 Min PRN, Fariba Olmos MD    nitroglycerin (NITROSTAT) SL tablet 0.4 mg, 0.4 mg, Sublingual, Q5 Min PRN, Fariba Olmos MD    nystatin (MYCOSTATIN) powder, , Topical, BID, Fariba Olmos MD, Given at 07/25/23 0822     ondansetron (ZOFRAN) tablet 4 mg, 4 mg, Oral, Q6H PRN **OR** ondansetron (ZOFRAN) injection 4 mg, 4 mg, Intravenous, Q6H PRN, Fariba Olmos MD    QUEtiapine (SEROquel) tablet 12.5 mg, 12.5 mg, Oral, Nightly PRN, Fariba Olmos MD    sodium chloride 0.9 % flush 10 mL, 10 mL, Intravenous, PRN, Jayden Bryant MD    sodium chloride 0.9 % flush 10 mL, 10 mL, Intravenous, Q12H, Fariba Olmos MD, 10 mL at 23 0822    sodium chloride 0.9 % flush 10 mL, 10 mL, Intravenous, PRN, Fariba Olmos MD    sodium chloride 0.9 % infusion 40 mL, 40 mL, Intravenous, PRN, Fariba Olmos MD    tamsulosin (FLOMAX) 24 hr capsule 0.4 mg, 0.4 mg, Oral, Daily, Fariba Olmos MD, 0.4 mg at 23 0821    Antibiotics:  Anti-Infectives (From admission, onward)      Ordered     Dose/Rate Route Frequency Start Stop    23  cefTRIAXone (ROCEPHIN) 1000 mg/100 mL 0.9% NS (MBP)        Ordering Provider: Fariba Olmos MD    1,000 mg  over 30 Minutes Intravenous Every 24 Hours 23 1200 23 1159    23 2213  Linezolid (ZYVOX) 600 mg 300 mL        Ordering Provider: Fariba Olmos MD    600 mg  300 mL/hr over 60 Minutes Intravenous Every 12 Hours 23 0900 23 0859    23 1912  cefTRIAXone (ROCEPHIN) 2000 mg/100 mL 0.9% NS IVPB (MBP)        Ordering Provider: Jayden Bryant MD    2,000 mg  over 30 Minutes Intravenous Once 23 1928 23 2242    23 1911  Linezolid (ZYVOX) 600 mg 300 mL        Ordering Provider: Jayden Bryant MD    600 mg  300 mL/hr over 60 Minutes Intravenous Once 23 19223 2301              Review of Systems:  See HPI      Physical Exam:   Vital Signs  Temp (24hrs), Av.8 °F (36.6 °C), Min:97.4 °F (36.3 °C), Max:98.2 °F (36.8 °C)    Temp  Min: 97.4 °F (36.3 °C)  Max: 98.2 °F (36.8 °C)  BP  Min: 100/63  Max: 129/90  Pulse  Min: 60  Max: 86  Resp  Min: 16  Max: 18  SpO2  Min: 95 %  Max: 99 %    GENERAL: Awake and alert, in no acute distress.   HEENT:  Normocephalic, atraumatic.  PERRL. EOMI. No conjunctival injection. No icterus. Oropharynx clear without evidence of thrush or exudate.   NECK: Supple   HEART: RRR; No murmur, rubs, gallops.   LUNGS: Clear to auscultation bilaterally without wheezing, rales, rhonchi. Normal respiratory effort. Nonlabored. .  ABDOMEN: Soft, nontender, nondistended.  No rebound or guarding. NO mass or HSM.  EXT: He has decreased erythema over the left first metatarsal region with a ruptured bullous lesion that is approximately 3 cm in diameter with no purulent drainage.  :  Without Fonseca catheter.  MSK: No joint effusions or erythema  SKIN: No diffuse rash present  NEURO: Oriented to PPT.  Motor 5/5 strength  PSYCHIATRIC: Normal insight and judgment. Cooperative with PE    Laboratory Data    Results from last 7 days   Lab Units 07/25/23  0525 07/23/23  1746   WBC 10*3/mm3 6.32 10.88*   HEMOGLOBIN g/dL 11.2* 12.2*   HEMATOCRIT % 34.4* 39.0   PLATELETS 10*3/mm3 166 179       Results from last 7 days   Lab Units 07/25/23  0525   SODIUM mmol/L 139   POTASSIUM mmol/L 4.4   CHLORIDE mmol/L 104   CO2 mmol/L 23.0   BUN mg/dL 47*   CREATININE mg/dL 1.61*   GLUCOSE mg/dL 165*   CALCIUM mg/dL 8.4*       Results from last 7 days   Lab Units 07/23/23  1823   ALK PHOS U/L 114   BILIRUBIN mg/dL 0.5   ALT (SGPT) U/L 28   AST (SGOT) U/L 25       Results from last 7 days   Lab Units 07/23/23  1746   SED RATE mm/hr 30*       Results from last 7 days   Lab Units 07/23/23  1823   CRP mg/dL 3.60*       Results from last 7 days   Lab Units 07/23/23  1746   LACTATE mmol/L 1.6               Estimated Creatinine Clearance: 43.5 mL/min (A) (by C-G formula based on SCr of 1.61 mg/dL (H)).      Microbiology:  No results found for: ACANTHNAEG, AFBCX, BPERTUSSISCX, BLOODCX  No results found for: BCIDPCR, CXREFLEX, CSFCX, CULTURETIS  No results found for: CULTURES, HSVCX, URCX  No results found for: EYECULTURE, GCCX, HSVCULTURE, LABHSV  No results found for:  LEGIONELLA, MRSACX, MUMPSCX, MYCOPLASCX  No results found for: NOCARDIACX, STOOLCX  No results found for: THROATCX, UNSTIMCULT, URINECX, CULTURE, VZVCULTUR  No results found for: VIRALCULTU, WOUNDCX        Radiology:  Imaging Results (Last 72 Hours)       Procedure Component Value Units Date/Time    MRI Foot Left Without Contrast [[id number]] Resulted: 07/25/23 1714     Updated: 07/25/23 1715    XR Foot 3+ View Left [[id number]] Collected: 07/23/23 1719     Updated: 07/23/23 1725    Narrative:      XR FOOT 3+ VW LEFT    Date of Exam: 7/23/2023 5:03 PM EDT    Indication: Left foot swelling, redness, and blistering, previous partial forefoot amputation.    Comparison: Left foot x-ray performed on 7/27/2022 and a left toe x-ray from 1/16/2023.    Findings:  The patient is status post amputation of the left great toe and the distal first metatarsal bone. The patient's had interval amputation of most of the left second toe. The base of the proximal phalanx remains. There is no cortical destruction or   suspicious periostitis to indicate radiographic changes of osteomyelitis. There is mature periosteal reaction along the shafts of the second through fourth metatarsal bones which can be seen with venous stasis. There is mild spurring within the midfoot   consistent with arthritis. There is a prominent calcaneal enthesophyte. There are scattered vascular calcifications.        Impression:      Impression:    1. Status post amputation procedures of the left foot as described.  2. No radiographic changes of osteomyelitis.  3. Additional incidental findings as noted above.      Electronically Signed: Pan Sharp    7/23/2023 5:22 PM EDT    Workstation ID: [id number]    XR Chest 1 View [[id number]] Collected: 07/23/23 1653     Updated: 07/23/23 1657    Narrative:      XR CHEST 1 VW    Date of Exam: 7/23/2023 4:47 PM EDT    Indication: Chest Pain Triage Protocol    Comparison: 1/16/2023    Findings:  Patient is status post median  sternotomy and CABG. Left chest wall pacemaker is present. Cardiac silhouette is enlarged. No consolidation or mass is seen. No pleural effusion or pneumothorax is seen. No acute osseous lesion is seen.      Impression:      Impression:  1.Cardiomegaly. No acute radiographic abnormality is identified.      Electronically Signed: Lucio Lanier    7/23/2023 4:54 PM EDT    Workstation ID: [id number]              Impression:   Left forefoot diabetic wound infection/cellulitis-the appearance and rapidity of onset suggests a beta streptococcal or staphylococcal infection. He has significantly improved overnight on intravenous ceftriaxone and Zyvox.  I will plan to leave him on this antibiotic regimen for the time being.  An MRI scan of the left foot is pending.  It is difficult to ascertain how deep this infection goes but he could have underlying osteomyelitis.  Leukocytosisneutrophilia-secondary to the left foot infection  Diabetic peripheral vascular disease  Type 2 diabetes mellitus  Diabetic peripheral neuropathy  Stage IIIb chronic kidney disease-We will need to try to avoid potentially nephrotoxic antibiotic therapy due to his chronic kidney disease  Chronic diastolic CHF  Paroxysmal atrial fibrillation  Sick sinus syndrome-status post pacemaker placement  Coronary artery disease-status post CABG on 3/22/19    PLAN/RECOMMENDATIONS:   Blood cultures-pending  Continue intravenous ceftriaxone  Change Zyvox to 600 mg p.o. twice daily  4.   Left foot MRI scan-pending    I discussed his complex situation with Dr. Blackwood today.  He is clinically improving.  It is difficult to ascertain how deep this infection extends.  He may be ready to discharge tomorrow with acute outpatient care/outpatient IV antibiotic therapy in our office.  His high complexity medical problems required high complexity medical decision making today.       Lincoln Padilla MD  7/25/2023  18:13 EDT

## 2023-07-25 NOTE — THERAPY EVALUATION
Patient Name: Jermaine Singh  : 1945    MRN: [id number]                              Today's Date: 2023       Admit Date: 2023    Visit Dx:     ICD-10-CM ICD-9-CM   1. Cellulitis of left foot  L03.116 682.7   2. Chest pain, unspecified type  R07.9 786.50   3. Acute on chronic congestive heart failure, unspecified heart failure type  I50.9 428.0   4. 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 Au    Atherosclerosis of native artery of both lower extremities with intermittent claudication    SSS (sick sinus syndrome)    Chronic HFrEF (heart failure with reduced ejection fraction)    DVT, bilateral lower limbs    Cellulitis of right lower extremity    Anemia, chronic disease    PVD (peripheral vascular disease)    Status post amputation of great toe, left    Diabetic foot ulcer    GERD without esophagitis    Stage 3b chronic kidney disease    Arthralgia of shoulder    Right inguinal hernia    Diabetic foot infection    Status post amputation of great toe and second toe of left foot    PAF (paroxysmal atrial fibrillation)    Moderate malnutrition     Past Medical History:   Diagnosis Date    Allergic     Arthritis     Asthma     Coronary artery disease     Diabetes mellitus 2000    started on inuslin 2018; started on po meds in ; checking blood sugars daily     Disease of thyroid gland     po meds daily for hypothyroidism     Elevated serum creatinine 11/15/2022    Elevated troponin 10/02/2022    Generalized weakness 11/15/2022    History of fracture as a child     rt leg- severe     Hyperlipidemia     Hypertension     Hypothyroidism     PAF (paroxysmal atrial fibrillation) 2023    Peripheral neuropathy     Peripheral vascular disease     s/p angiogram -needs  stent in left leg     Pleural effusion, bilateral 11/15/2022    Proximal phalanx fracture of the second digit extending into the second metatarsal joint 07/28/2022    RBBB     Right knee pain     Unstable angina 02/12/2019    Added automatically from request for surgery [id number]    Vitamin D deficiency      Past Surgical History:   Procedure Laterality Date    AMPUTATION DIGIT Left 01/17/2023    Procedure: AMPUTATION DIGIT LEFT;  Surgeon: Gopal Márquez MD;  Location:  [facility];  Service: Vascular;  Laterality: Left;    APPENDECTOMY      CARDIAC CATHETERIZATION N/A 02/14/2019    Procedure: Left Heart Cath;  Surgeon: Cooper Apodaca MD;  Location:  [facility] CATH INVASIVE LOCATION;  Service: Cardiology    CARDIAC ELECTROPHYSIOLOGY PROCEDURE N/A 05/18/2022    Procedure: DEVICE IMPLANT;  Surgeon: Cooper Apodaca MD;  Location: [facility] INVASIVE LOCATION;  Service: Cardiology;  Laterality: N/A;    CARDIAC SURGERY      COLONOSCOPY      CORONARY ARTERY BYPASS GRAFT N/A 03/22/2019    Procedure: MEDIAN STERNOTOMY, CORONARY ARTERY BYPASS GRAFT X3, UTILIZING THE LEFT INTERNAL MAMMARY ARTERY, EVH AND OPEN HARVEST OF THE RIGHT GREATER SAPHENOUS VEIN, EXPLORATION OF THE LEFT LEG;  Surgeon: Ap Au MD;  Location:  [facility];  Service: Cardiothoracic    EYE SURGERY Bilateral     cataracts     FRACTURE SURGERY      INTERVENTIONAL RADIOLOGY PROCEDURE N/A 07/29/2021    Procedure: Abdominal Aortagram with Runoff;  Surgeon: Jermaine Hernandez MD;  Location:  [facility] CATH INVASIVE LOCATION;  Service: Cardiovascular;  Laterality: N/A;    KNEE ARTHROSCOPY      right x 2, left x 1    LACERATION REPAIR      right leg    LEG SURGERY      2 for fracture of rt leg     TONSILLECTOMY      Adnoidectomy    TRANS METATARSAL AMPUTATION Left 08/02/2022    Procedure: GREAT TOE AMPUTATION LEFT;  Surgeon: Gopal Márquez MD;  Location: [facility];  Service: Vascular;  Laterality: Left;      General Information       Row Name 07/25/23  1556          Physical Therapy Time and Intention    Document Type evaluation  -AB     Mode of Treatment physical therapy  -AB       Row Name 07/25/23 1556          General Information    Patient Profile Reviewed yes  -AB     Prior Level of Function independent:;all household mobility;community mobility;gait;transfer;bed mobility;ADL's;using stairs  Currently attending OPPT. Using RW for household distances. Rollator for community ambulation.  -AB     Existing Precautions/Restrictions fall;other (see comments)  Protected WB of LLE as clarified by Dr. Blackwood.  -AB     Barriers to Rehab medically complex  -AB       Row Name 07/25/23 1556          Living Environment    People in Home spouse  -AB       Row Name 07/25/23 1556          Home Main Entrance    Number of Stairs, Main Entrance four  -AB     Stair Railings, Main Entrance railings on both sides of stairs  -AB       Row Name 07/25/23 1556          Stairs Within Home, Primary    Number of Stairs, Within Home, Primary none  -AB       Row Name 07/25/23 1556          Cognition    Orientation Status (Cognition) oriented x 4  -AB       Row Name 07/25/23 1556          Safety Issues, Functional Mobility    Safety Issues Affecting Function (Mobility) awareness of need for assistance;insight into deficits/self-awareness;safety precaution awareness;safety precautions follow-through/compliance  -AB     Impairments Affecting Function (Mobility) balance;endurance/activity tolerance;strength;sensation/sensory awareness  -AB               User Key  (r) = Recorded By, (t) = Taken By, (c) = Cosigned By      Initials Name Provider Type    AB Macrina Asher, PT Physical Therapist                   Mobility       Row Name 07/25/23 1557          Bed Mobility    Comment, (Bed Mobility) Received and left [facility].  -AB       Row Name 07/25/23 1557          Transfers    Comment, (Transfers) Cues for hand placement and sequencing. Pt denied pain in L foot throughout session and no order noted  for off loading shoe at this time.  -AB       Row Name 07/25/23 1557          Bed-Chair Transfer    Bed-Chair [place] (Transfers) unable to assess  -AB       Row Name 07/25/23 1557          Sit-Stand Transfer    Sit-Stand [place] (Transfers) contact guard;1 person assist;verbal cues  -AB     Assistive Device (Sit-Stand Transfers) walker, front-wheeled  -AB     Comment, (Sit-Stand Transfer) Cues to push up from chair.  -AB       Row Name 07/25/23 1557          Gait/Stairs (Locomotion)    [place] Level (Gait) contact guard;1 person assist;verbal cues  -AB     Assistive Device (Gait) walker, front-wheeled  -AB     Distance in Feet (Gait) 350'  -AB     Deviations/Abnormal Patterns (Gait) bilateral deviations;ashli decreased;gait speed decreased;stride length decreased  -AB     Bilateral Gait Deviations forward flexed posture  -AB     [place] Level (Stairs) unable to assess  -AB     Comment, (Gait/Stairs) Pt demo's step through gait pattern with decreased stride length and slowed ashli. Cues for upright posture and walker positioning. No overt LOB. Further activity limited by fatigue.  -AB       Row Name 07/25/23 1557          Mobility    Extremity Weight-bearing Status left lower extremity  -AB     Left Lower Extremity (Weight-bearing Status) weight-bearing as tolerated (WBAT);other (see comments)  Protected WBAT  -AB               User Key  (r) = Recorded By, (t) = Taken By, (c) = Cosigned By      Initials Name Provider Type    AB Macrina Asher, PT Physical Therapist                   Obj/Interventions       Row Name 07/25/23 1600          Range of Motion Comprehensive    General Range of Motion bilateral lower extremity ROM WFL  -AB       Row Name 07/25/23 1600          Strength Comprehensive (MMT)    General Manual Muscle Testing (MMT) Assessment lower extremity strength deficits identified  -AB     Comment, General Manual Muscle Testing (MMT) Assessment BLE strength grossly 4/5  -AB        Row Name 07/25/23 1600          Balance    Balance Assessment sitting static balance;sitting dynamic balance;standing static balance;standing dynamic balance  -AB     Static Sitting Balance independent  -AB     Dynamic Sitting Balance standby assist  -AB     Position, Sitting Balance unsupported;sitting in chair  -AB     Static Standing Balance contact guard;1-person assist  -AB     Dynamic Standing Balance contact guard;1-person assist;verbal cues  -AB     Position/Device Used, Standing Balance supported;walker, front-wheeled  -AB     Balance Interventions sitting;standing;sit to stand;supported;static;dynamic  -AB     Comment, Balance No overt LOB.  -AB       Row Name 07/25/23 1600          Sensory Assessment (Somatosensory)    Sensory Assessment (Somatosensory) bilateral LE  -AB     Bilateral LE Sensory Assessment impaired  Peripheral neuropathy  -AB               User Key  (r) = Recorded By, (t) = Taken By, (c) = Cosigned By      Initials Name Provider Type    AB Macrina Asher, PT Physical Therapist                   Goals/Plan       Row Name 07/25/23 1603          Bed Mobility Goal 1 (PT)    Activity/Assistive Device (Bed Mobility Goal 1, PT) supine to sit;sit to supine  -AB     [place] Level/Cues Needed (Bed Mobility Goal 1, PT) independent  -AB     Time Frame (Bed Mobility Goal 1, PT) long term goal (LTG);10 days  -AB       Row Name 07/25/23 1603          Transfer Goal 1 (PT)    Activity/Assistive Device (Transfer Goal 1, PT) sit-to-stand/stand-to-sit;bed-to-chair/chair-to-bed;walker, rolling  -AB     [place] Level/Cues Needed (Transfer Goal 1, PT) modified independence  -AB     Time Frame (Transfer Goal 1, PT) long term goal (LTG);10 days  -AB       Row Name 07/25/23 1603          Gait Training Goal 1 (PT)    Activity/Assistive Device (Gait Training Goal 1, PT) gait (walking locomotion);assistive device use;walker, rolling  -AB     [place] Level (Gait Training Goal 1, PT) modified  independence  -AB     Distance (Gait Training Goal 1, PT) 400  -AB     Time Frame (Gait Training Goal 1, PT) long term goal (LTG);10 days  -AB       Row Name 07/25/23 1603          Stairs Goal 1 (PT)    Activity/Assistive Device (Stairs Goal 1, PT) ascending stairs;descending stairs;using handrail, left;using handrail, right  -AB     [place] Level/Cues Needed (Stairs Goal 1, PT) contact guard required  -AB     Number of Stairs (Stairs Goal 1, PT) 4  -AB     Time Frame (Stairs Goal 1, PT) long term goal (LTG);10 days  -AB       Row Name 07/25/23 1603          Therapy Assessment/Plan (PT)    Planned Therapy Interventions (PT) balance training;bed mobility training;gait training;home exercise program;patient/family education;postural re-education;transfer training;stretching;strengthening;stair training;ROM (range of motion)  -AB               User Key  (r) = Recorded By, (t) = Taken By, (c) = Cosigned By      Initials Name Provider Type    AB Macrina Asher, PT Physical Therapist                   Clinical Impression       Row Name 07/25/23 1601          Pain    Pretreatment Pain Rating 0/10 - no pain  -AB     Posttreatment Pain Rating 0/10 - no pain  -AB     Additional Documentation Pain Scale: FACES Pre/Post-Treatment (Group)  -AB       Row Name 07/25/23 1601          Pain Scale: FACES Pre/Post-Treatment    Pain: FACES Scale, Pretreatment 0-->no hurt  -AB     Posttreatment Pain Rating 0-->no hurt  -AB       Row Name 07/25/23 1601          Plan of Care Review    Plan of Care Reviewed With patient  -AB     Progress no change  -AB     Outcome Evaluation PT initial eval completed. Pt presents below his functional baseline with decreased strength, mild balance deficits, and impaired endurance. Ambulation of 350' with CGA and RW was well tolerated. Pt will benefit from further IPPT for addressing deficits. PT rec d/c home with assist and OPPT when medically appropriate.  -AB       Row Name 07/25/23 1603           Therapy Assessment/Plan (PT)    Rehab Potential (PT) good, to achieve stated therapy goals  -AB     Criteria for Skilled Interventions Met (PT) yes;meets criteria;skilled treatment is necessary  -AB     Therapy Frequency (PT) daily  -AB       Row Name 07/25/23 1601          Vital Signs    Pre Systolic BP Rehab 117  -AB     Pre Treatment Diastolic BP 67  -AB     Post Systolic BP Rehab 153  -AB     Post Treatment Diastolic BP 89  -AB     O2 Delivery Pre Treatment room air  -AB     O2 Delivery Intra Treatment room air  -AB     O2 Delivery Post Treatment room air  -AB     Pre Patient Position Sitting  -AB     Intra Patient Position Standing  -AB     Post Patient Position Sitting  -AB       Row Name 07/25/23 1601          Positioning and Restraints    Pre-Treatment Position sitting in chair/recliner  -AB     Post Treatment Position chair  -AB     In Chair notified nsg;reclined;sitting;call light within reach;encouraged to call for assist;exit alarm on;legs elevated;waffle cushion  -AB               User Key  (r) = Recorded By, (t) = Taken By, (c) = Cosigned By      Initials Name Provider Type    AB Macrina Asher, PT Physical Therapist                   Outcome Measures       Row Name 07/25/23 1604          How much help from another person do you currently need...    Turning from your back to your side while in flat bed without using bedrails? 4  -AB     Moving from lying on back to sitting on the side of a flat bed without bedrails? 3  -AB     Moving to and from a bed to a chair (including a wheelchair)? 3  -AB     Standing up from a chair using your arms (e.g., wheelchair, bedside chair)? 3  -AB     Climbing 3-5 steps with a railing? 3  -AB     To walk in hospital room? 3  -AB     AM-PAC 6 Clicks Score (PT) 19  -AB     Highest level of mobility 6 --> Walked 10 steps or more  -AB       Row Name 07/25/23 1604          Functional Assessment    Outcome Measure Options AM-PAC 6 Clicks Basic Mobility (PT)  -AB                User Key  (r) = Recorded By, (t) = Taken By, (c) = Cosigned By      Initials Name Provider Type    AB Macrina Asher, PT Physical Therapist                                 Physical Therapy Education       Title: PT OT SLP Therapies (In Progress)       Topic: Physical Therapy (In Progress)       Point: Mobility training (Done)       Learning Progress Summary             Patient Acceptance, E,D, VU,NR by AB at 7/25/2023 1604                         Point: Home exercise program (Not Started)       Learner Progress:  Not documented in this visit.              Point: Body mechanics (Done)       Learning Progress Summary             Patient Acceptance, E,D, VU,NR by AB at 7/25/2023 1604                         Point: Precautions (Done)       Learning Progress Summary             Patient Acceptance, E,D, VU,NR by AB at 7/25/2023 1604                                         User Key       Initials Effective Dates Name Provider Type Discipline    AB 09/22/22 -  Macrina Asher PT Physical Therapist PT                  PT Recommendation and Plan  Planned Therapy Interventions (PT): balance training, bed mobility training, gait training, home exercise program, patient/family education, postural re-education, transfer training, stretching, strengthening, stair training, ROM (range of motion)  Plan of Care Reviewed With: patient  Progress: no change  Outcome Evaluation: PT initial eval completed. Pt presents below his functional baseline with decreased strength, mild balance deficits, and impaired endurance. Ambulation of 350' with CGA and RW was well tolerated. Pt will benefit from further IPPT for addressing deficits. PT rec d/c home with assist and OPPT when medically appropriate.     Time Calculation:   PT Evaluation Complexity  History, PT Evaluation Complexity: 1-2 personal factors and/or comorbidities  Examination of Body Systems (PT Eval Complexity): total of 3 or more elements  Clinical Presentation (PT  Evaluation Complexity): evolving  Clinical Decision Making (PT Evaluation Complexity): moderate complexity  Overall Complexity (PT Evaluation Complexity): moderate complexity     PT Charges       Row Name 07/25/23 1604             Time Calculation    Start Time 1522  -AB      PT Received On 07/25/23  -AB      PT Goal Re-Cert Due Date 08/04/23  -AB         Untimed Charges    PT Eval/Re-eval Minutes 52  -AB         Total Minutes    Untimed Charges Total Minutes 52  -AB       Total Minutes 52  -AB                User Key  (r) = Recorded By, (t) = Taken By, (c) = Cosigned By      Initials Name Provider Type    AB Macrina Asher, PT Physical Therapist                  Therapy Charges for Today       Code Description Service Date Service Provider Modifiers Qty    10864404263 HC PT EVAL MOD COMPLEXITY 4 7/25/2023 Macrina Asher, PT GP 1            PT G-Codes  Outcome Measure Options: AM-PAC 6 Clicks Basic Mobility (PT)  AM-PAC 6 Clicks Score (PT): 19  PT Discharge Summary  Anticipated Discharge Disposition (PT): home with assist, home with outpatient therapy services    Macrina Asher PT  7/25/2023

## 2023-07-25 NOTE — TELEPHONE ENCOUNTER
He is admitted to the hospital, I cannot make any changes to his med list at this time.  He can discuss this with the hospitalist.    Also, it does not matter if he takes his Levemir at night or in the morning as long as it is once a day.

## 2023-07-25 NOTE — TELEPHONE ENCOUNTER
Pt states that he is getting his insulin at night instead of the morning bc of the way it is on med list.  Would like to have this changed.  Please advise.

## 2023-07-25 NOTE — PROGRESS NOTES
Malnutrition Severity Assessment    Patient Name:  Jermaine Singh  YOB: 1945  MRN: [id number]  Admit Date:  7/23/2023    Patient meets criteria for : Moderate (non-severe) Malnutrition (Pt meets criteria for non severe chronic malnutrition based on wasting.)    Comments:      Malnutrition Severity Assessment  Malnutrition Type: Chronic Disease - Related Malnutrition  Malnutrition Type (last 8 hours)       Malnutrition Severity Assessment       Row Name 07/24/23 2111       Malnutrition Severity Assessment    Malnutrition Type Chronic Disease - Related Malnutrition      Row Name 07/24/23 2111       Insufficient Energy Intake     Insufficient Energy Intake Findings --  uable to quantify      Row Name 07/24/23 2111       Unintentional Weight Loss     Unintentional Weight Loss Findings --  may reflect fluid status    Unintentional Weight Loss  Weight loss greater than 5% in one month      Row Name 07/24/23 2111       Muscle Loss    Loss of Muscle Mass Findings Mild    [place] Region None    Clavicle Bone Region --  mild    Acromion Bone Region None    Scapular Bone Region --  mild    Dorsal Hand Region --  mild    Patellar Region None    Anterior Thigh Region --  mild    Posterior Calf Region --  mild      Row Name 07/24/23 2111       Fat Loss    Subcutaneous Fat Loss Findings Mild    Orbital Region  None    Upper Arm Region --  mild    Thoracic & Lumbar Region --  mild      Row Name 07/24/23 2111       Criteria Met (Must meet criteria for severity in at least 2 of these categories: M Wasting, Fat Loss, Fluid, Secondary Signs, Wt. Status, Intake)    Patient meets criteria for  Moderate (non-severe) Malnutrition  Pt meets criteria for non severe chronic malnutrition based on wasting.                    Electronically signed by:  Lilia Palmer RD  07/24/23 21:28 EDT

## 2023-07-25 NOTE — TELEPHONE ENCOUNTER
"  Caller: Harshad Singh T \"Adi\"    Relationship: Self    Best call back number: [phone]    What is the best time to reach you: ANYTIME BEFORE 4 PM    Who are you requesting to speak with (clinical staff, provider,  specific staff member): CLINICAL STAFF    Do you know the name of the person who called: PATIENT/ HARSHAD    What was the call regarding: PATIENT IS IN [facility] AND NEEDS HIS INSULIN DIRECTIONS TO BE CHANGED TO BE TAKEN IN MORNINGS INSTEAD OF NIGHTS; PLEASE ADJUST ON MED LIST WHILE HE IS IN THE HOSPITAL.  HE SHOULD BE GOING HOME TOMORROW      insulin detemir (LEVEMIR) 100 UNIT/ML injection            Is it okay if the provider responds through MyChart:         "

## 2023-07-26 ENCOUNTER — READMISSION MANAGEMENT (OUTPATIENT)
Dept: CALL CENTER | Facility: HOSPITAL | Age: 78
End: 2023-07-26
Payer: MEDICARE

## 2023-07-26 VITALS
HEIGHT: 75 IN | HEART RATE: 65 BPM | DIASTOLIC BLOOD PRESSURE: 78 MMHG | SYSTOLIC BLOOD PRESSURE: 124 MMHG | BODY MASS INDEX: 22.28 KG/M2 | WEIGHT: 179.2 LBS | RESPIRATION RATE: 18 BRPM | OXYGEN SATURATION: 97 % | TEMPERATURE: 97.4 F

## 2023-07-26 PROCEDURE — 87070 CULTURE OTHR SPECIMN AEROBIC: CPT | Performed by: INTERNAL MEDICINE

## 2023-07-26 PROCEDURE — 87205 SMEAR GRAM STAIN: CPT | Performed by: INTERNAL MEDICINE

## 2023-07-26 PROCEDURE — 25010000002 ENOXAPARIN PER 10 MG

## 2023-07-26 PROCEDURE — 25010000002 CEFTRIAXONE PER 250 MG: Performed by: INTERNAL MEDICINE

## 2023-07-26 PROCEDURE — 87186 SC STD MICRODIL/AGAR DIL: CPT | Performed by: INTERNAL MEDICINE

## 2023-07-26 PROCEDURE — 87147 CULTURE TYPE IMMUNOLOGIC: CPT | Performed by: INTERNAL MEDICINE

## 2023-07-26 RX ADMIN — LIDOCAINE 1 PATCH: 50 PATCH CUTANEOUS at 08:05

## 2023-07-26 RX ADMIN — ENOXAPARIN SODIUM 60 MG: 60 INJECTION SUBCUTANEOUS at 08:04

## 2023-07-26 RX ADMIN — BUMETANIDE 1 MG: 1 TABLET ORAL at 08:04

## 2023-07-26 RX ADMIN — LEVOTHYROXINE SODIUM 88 MCG: 0.09 TABLET ORAL at 05:47

## 2023-07-26 RX ADMIN — CARVEDILOL 3.12 MG: 3.12 TABLET, FILM COATED ORAL at 08:04

## 2023-07-26 RX ADMIN — ASPIRIN 81 MG: 81 TABLET, CHEWABLE ORAL at 08:04

## 2023-07-26 RX ADMIN — SENNOSIDES AND DOCUSATE SODIUM 2 TABLET: 50; 8.6 TABLET ORAL at 08:05

## 2023-07-26 RX ADMIN — NYSTATIN: 100000 POWDER TOPICAL at 08:06

## 2023-07-26 RX ADMIN — Medication 10 ML: at 08:09

## 2023-07-26 RX ADMIN — FAMOTIDINE 20 MG: 20 TABLET ORAL at 08:04

## 2023-07-26 RX ADMIN — LINEZOLID 600 MG: 600 TABLET, FILM COATED ORAL at 08:04

## 2023-07-26 RX ADMIN — TAMSULOSIN HYDROCHLORIDE 0.4 MG: 0.4 CAPSULE ORAL at 08:04

## 2023-07-26 RX ADMIN — MAGNESIUM OXIDE TAB 400 MG (241.3 MG ELEMENTAL MG) 400 MG: 400 (241.3 MG) TAB at 08:04

## 2023-07-26 RX ADMIN — SODIUM CHLORIDE 2000 MG: 900 INJECTION INTRAVENOUS at 11:07

## 2023-07-26 NOTE — DISCHARGE SUMMARY
[facility] Medicine Services  DISCHARGE SUMMARY    Patient Name: Jermaine Singh  : 1945  MRN: [id number]    Date of Admission: 2023  4:30 PM  Date of Discharge:    Primary Care Physician: Marysol Shrestha MD    Consults       Date and Time Order Name Status Description    2023 11:45 AM Inpatient Cardiology Consult Completed     2023 12:33 AM Inpatient Infectious Diseases Consult Completed     2023 12:33 AM Inpatient Cardiothoracic Surgery Consult Completed             Hospital Course     Presenting Problem: diabetic foot infection    Active Hospital Problems    Diagnosis  POA    **Diabetic foot infection [E11.628, L08.9]  Yes    Moderate malnutrition [E44.0]  Yes    Status post amputation of great toe and second toe of left foot [Z89.422]  Not Applicable    PAF (paroxysmal atrial fibrillation) [I48.0]  Yes    PVD (peripheral vascular disease) [I73.9]  Yes    Stage 3b chronic kidney disease [N18.32]  Yes    Chronic HFrEF (heart failure with reduced ejection fraction) [I50.22]  Yes    SSS (sick sinus syndrome) [I49.5]  Yes    Atherosclerosis of native artery of both lower extremities with intermittent claudication [I70.213]  Yes    Type 2 diabetes mellitus [E11.9]  Yes    Hypertension [I10]  Yes    Hyperlipidemia [E78.5]  Yes    Hypothyroidism (acquired) [E03.9]  Yes    S/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 Au [Z95.1]  Not Applicable    Diabetic neuropathy [E11.40]  Yes      Resolved Hospital Problems   No resolved problems to display.          Hospital Course:  Jermaine Singh is a 78 y.o. male  w h/o CAD s/p CABG, DMII, s/p pacemaker, multiple toe amputations right foot, unable to re-vascularize severe PAD who presented with rapid onset left foot infection, concerning for GBS.  Vascular consulted - they offered below knee amputation which patient declined. No need for emergent intervention.  MRI was completed of area with abscess present, wound culture taken  "by Dr Padilla 7/26. 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 Will discharge on IV ceftriaxone daily in ID clinic. Patient to f/u tomorrow for infusion. D/w Dr Padilla and no further linezolid needed for now  Patient will return to previous OP-PT provider for continued work in setting of multiple toe amputations left foot and will follow with ID closely      Discharge Follow Up Recommendations for outpatient labs/diagnostics:   F/u in ID clinic tmrw for IV ceftriaxone      Day of Discharge     HPI:   Patient ready to go today  Felt much better when moving with PT yesterday  Feels foot looks \"100% better\" than it did - some drainage but not purulent      Vital Signs:   Temp:  [97.4 °F (36.3 °C)-98.1 °F (36.7 °C)] 97.4 °F (36.3 °C)  Heart Rate:  [59-66] 65  Resp:  [16-20] 18  BP: ()/(60-78) 124/78      Physical Exam:  Constitutional: No acute distress, awake, alert male sitting up in bed  HENT: NCAT, mucous membranes moist  Respiratory: Clear to auscultation bilaterally, respiratory effort normal   Cardiovascular: RRR, no murmurs, rubs, or gallops  Gastrointestinal: Soft, nontender, nondistended  Musculoskeletal: Foot wound examined around dressing that is in place - some light yellow/clear drainage but nothing purulent, cellulitis much I'm[roved from prior  Psychiatric: Appropriate affect, cooperative  Neurologic: Alert and oriented, facial movements symmetric and spontaneous movement of all 4 extremities grossly equal bilaterally, speech clear  Skin: No rashes    Pertinent  and/or Most Recent Results     LAB RESULTS:      Lab 07/25/23  0525 07/23/23  1823 07/23/23  1746   WBC 6.32  --  10.88*   HEMOGLOBIN 11.2*  --  12.2*   HEMATOCRIT 34.4*  --  39.0   PLATELETS 166  --  179   NEUTROS ABS 4.15  --  8.67*   IMMATURE GRANS (ABS) 0.02  --  0.03   LYMPHS ABS 1.29  --  0.97   MONOS ABS 0.51  --  0.86   EOS ABS 0.31  --  0.30   MCV 89.6  --  91.5   SED RATE  --   --  30*   CRP  --  " 3.60*  --    LACTATE  --   --  1.6         Lab 07/25/23  0525 07/23/23 2235 07/23/23 1823   SODIUM 139  --  142   POTASSIUM 4.4  --  4.8   CHLORIDE 104  --  105   CO2 23.0  --  25.0   ANION GAP 12.0  --  12.0   BUN 47*  --  46*   CREATININE 1.61*  --  1.65*   EGFR 43.5*  --  42.2*   GLUCOSE 165*  --  250*   CALCIUM 8.4*  --  8.8   HEMOGLOBIN A1C  --  8.70*  --          Lab 07/23/23  1823   TOTAL PROTEIN 6.4   ALBUMIN 3.4*   GLOBULIN 3.0   ALT (SGPT) 28   AST (SGOT) 25   BILIRUBIN 0.5   ALK PHOS 114   LIPASE 11*         Lab 07/23/23 2020 07/23/23 1823   PROBNP  --  33,171.0*   HSTROP T 104* 103*                 Brief Urine Lab Results  (Last result in the past 365 days)        Color   Clarity   Blood   Leuk Est   Nitrite   Protein   CREAT   Urine HCG        01/21/23 0907 Yellow   Turbid   Large (3+)   Large (3+)   Negative   100 mg/dL (2+)                 Microbiology Results (last 10 days)       Procedure Component Value - Date/Time    Wound Culture - Wound, Foot, Left [[id number]] Collected: 07/26/23 1223    Lab Status: Preliminary result Specimen: Wound from Foot, Left Updated: 07/26/23 1407     Gram Stain Many (4+) WBCs seen      Few (2+) Gram positive cocci in pairs    Blood Culture - Blood, Hand, Right [[id number]]  (Normal) Collected: 07/23/23 1825    Lab Status: Preliminary result Specimen: Blood from Hand, Right Updated: 07/25/23 1915     Blood Culture No growth at 2 days    Blood Culture - Blood, Arm, Right [[id number]]  (Normal) Collected: 07/23/23 1745    Lab Status: Preliminary result Specimen: Blood from Arm, Right Updated: 07/25/23 1800     Blood Culture No growth at 2 days            XR Foot 3+ View Left    Result Date: 7/23/2023  XR FOOT 3+ VW LEFT Date of Exam: 7/23/2023 5:03 PM EDT Indication: Left foot swelling, redness, and blistering, previous partial forefoot amputation. Comparison: Left foot x-ray performed on 7/27/2022 and a left toe x-ray from 1/16/2023. Findings: The patient is status post  amputation of the left great toe and the distal first metatarsal bone. The patient's had interval amputation of most of the left second toe. The base of the proximal phalanx remains. There is no cortical destruction or suspicious periostitis to indicate radiographic changes of osteomyelitis. There is mature periosteal reaction along the shafts of the second through fourth metatarsal bones which can be seen with venous stasis. There is mild spurring within the midfoot consistent with arthritis. There is a prominent calcaneal enthesophyte. There are scattered vascular calcifications.     Impression: 1. Status post amputation procedures of the left foot as described. 2. No radiographic changes of osteomyelitis. 3. Additional incidental findings as noted above. Electronically Signed: Pan Sharp  7/23/2023 5:22 PM EDT  Workstation ID: [id number]    XR Chest 1 View    Result Date: 7/23/2023  XR CHEST 1 VW Date of Exam: 7/23/2023 4:47 PM EDT Indication: Chest Pain Triage Protocol Comparison: 1/16/2023 Findings: Patient is status post median sternotomy and CABG. Left chest wall pacemaker is present. Cardiac silhouette is enlarged. No consolidation or mass is seen. No pleural effusion or pneumothorax is seen. No acute osseous lesion is seen.     Impression: 1.Cardiomegaly. No acute radiographic abnormality is identified. Electronically Signed: Lucio Lanier  7/23/2023 4:54 PM EDT  Workstation ID: [id number]    MRI Foot Left Without Contrast    Result Date: 7/25/2023  MRI FOOT LEFT WO CONTRAST Date of Exam: 7/25/2023 5:14 PM EDT Indication: Foot swelling, diabetic, osteomyelitis suspected, xray done.  Comparison: Radiographs July 23, 2023, January 16, 2023, July 27, 2022 Technique:  Routine multiplanar/multisequence sequence images of the left foot were obtained without contrast administration. Findings: There has been amputation of the first digit at the proximal metatarsal. There has been amputation of the second digit at the  base of the proximal phalanx. There is suspicion for a wound at the dorsal-medial aspect of the forefoot, distal to the first digit amputation site. There is T1 hypointense signal in the soft tissues in this location with heterogeneous T2 hyperintense signal. Evaluation for abscess is limited by lack of intravenous contrast, but there is suspicion for a subtle tract extending into the deep soft tissues with small focal fluid signal collection measuring approximately 17 x 7 x 11 mm. These findings can be seen on short axis series 7 image 13 and on long axis T2 images 12-14 of series 10. There are foci of susceptibility artifact in the soft tissues in the area of suspected wound suspicious for a small amount of soft tissue gas. There is also foci of susceptibility artifact at the amputation sites suggesting postsurgical changes. There is additional diffuse forefoot soft tissue edema. No other definite well-defined fluid signal collection is seen on this noncontrast study. There is mild diffuse muscle edema signal and mild to moderate muscle atrophy which could be seen with denervation and possibly myositis. There appears to be mild edema signal at the remaining base of the second proximal phalanx. Probable subchondral cyst formation is seen at the proximal aspect of the medial cuneiform, presumed to be related to arthropathy. No other significant marrow edema signal is identified. There does not appear to be confluent T1 signal abnormality in the osseous structures of the foot at this time to indicate osteomyelitis. No definite acute fracture is seen. There are hammertoe deformities.     Impression: 1.Status post amputation of the first digit at the proximal metatarsal and second digit at the base of the proximal phalanx. 2.Suspected wound at the dorsal-medial forefoot with suspicion for underlying tract extending into the deep soft tissues with small abscess. 3.Mild edema signal at the remaining base of the second  proximal phalanx, which may be reactive. No definite confluent T1 signal abnormality is seen at this time to definitively indicate osteomyelitis. Electronically Signed: Eayd Wilburn  7/25/2023 6:31 PM EDT  Workstation ID: [id number]     Results for orders placed during the hospital encounter of 07/27/22    Duplex Renal Artery - Bilateral Complete CAR    Interpretation Summary  US RENAL ARTERIES. DUPLEX RENAL ARTERY BILATERAL COMPLETE CAR-    History: Acute kidney injury in the setting of peripheral vascular  disease as well as history of CABG, rule out WILMA.    Comparison: None.    Technique: Two-dimensional grayscale, color flow, pulse wave spectral  Doppler examination of the right and left renal circulation. The study  is not optimized for the examination of the right and left renal  parenchyma.    Limitations: Bowel gas and patient's body habitus. The patient was short  of breath and unable to cooperate with breath-holding. This also limited  the visualization of the renal arteries.    Findings:    Abdominal aorta:  AP diameter of the Proximal segment: 1.7 cm. This is normal.  Peak systolic velocity in the suprarenal aorta is noted below. The  spectral Doppler waveform shows Low resistance waveform. There is  spectral broadening.    Renal artery ratios:  Right: Within normal limits.  Left: Within normal limits.    Right kidney: Limited examination  Size: Normal  Echogenicity: within normal limits to the extent seen.  Other findings: Not applicable.    Left kidney: Limited examination  Size: Normal  Echogenicity: within normal limits to the extent seen.  Other findings: A 1.3 x 1.4 cm cyst is mentioned in the note but no  corresponding images are available for my review of the study.    Right renal circulation:  Main right renal artery:  Peak systolic velocity criteria support absence of hemodynamically  significant renal artery stenosis.  The resistive index is elevated.  Acceleration time at the hilum is  borderline above the upper limits of  normal.    Right renal vein is patent.    Left renal circulation:  Main left renal artery:  Peak systolic velocity criteria support absence of hemodynamically  significant renal artery stenosis.  The resistive index is elevated.  Acceleration time at the hilum is normal.    Left renal vein is patent.    Other findings:  None.    Impression  Impression:  No sonographic evidence of clinically significant stenosis of the right  or the left renal artery on this exam.    The resistive indices are elevated bilaterally that in combination with  the findings suggest parenchymal renal disease.    Please note that the study is not optimized for evaluation of the right  and left renal parenchyma. A separate dedicated ultrasound examination  should be performed for that purpose.    This report was finalized on 8/16/2022 1:23 PM by Sinan Dinero MD.      Results for orders placed during the hospital encounter of 07/27/22    Duplex Renal Artery - Bilateral Complete CAR    Interpretation Summary  US RENAL ARTERIES. DUPLEX RENAL ARTERY BILATERAL COMPLETE CAR-    History: Acute kidney injury in the setting of peripheral vascular  disease as well as history of CABG, rule out WILMA.    Comparison: None.    Technique: Two-dimensional grayscale, color flow, pulse wave spectral  Doppler examination of the right and left renal circulation. The study  is not optimized for the examination of the right and left renal  parenchyma.    Limitations: Bowel gas and patient's body habitus. The patient was short  of breath and unable to cooperate with breath-holding. This also limited  the visualization of the renal arteries.    Findings:    Abdominal aorta:  AP diameter of the Proximal segment: 1.7 cm. This is normal.  Peak systolic velocity in the suprarenal aorta is noted below. The  spectral Doppler waveform shows Low resistance waveform. There is  spectral broadening.    Renal artery ratios:  Right: Within  normal limits.  Left: Within normal limits.    Right kidney: Limited examination  Size: Normal  Echogenicity: within normal limits to the extent seen.  Other findings: Not applicable.    Left kidney: Limited examination  Size: Normal  Echogenicity: within normal limits to the extent seen.  Other findings: A 1.3 x 1.4 cm cyst is mentioned in the note but no  corresponding images are available for my review of the study.    Right renal circulation:  Main right renal artery:  Peak systolic velocity criteria support absence of hemodynamically  significant renal artery stenosis.  The resistive index is elevated.  Acceleration time at the hilum is borderline above the upper limits of  normal.    Right renal vein is patent.    Left renal circulation:  Main left renal artery:  Peak systolic velocity criteria support absence of hemodynamically  significant renal artery stenosis.  The resistive index is elevated.  Acceleration time at the hilum is normal.    Left renal vein is patent.    Other findings:  None.    Impression  Impression:  No sonographic evidence of clinically significant stenosis of the right  or the left renal artery on this exam.    The resistive indices are elevated bilaterally that in combination with  the findings suggest parenchymal renal disease.    Please note that the study is not optimized for evaluation of the right  and left renal parenchyma. A separate dedicated ultrasound examination  should be performed for that purpose.    This report was finalized on 8/16/2022 1:23 PM by Sinan Dinero MD.      Results for orders placed during the hospital encounter of 07/27/22    Adult Transthoracic Echo Complete W/ Cont if Necessary Per Protocol    Interpretation Summary  · Left ventricular ejection fraction appears to be 46 - 50%. Left ventricular systolic function is low normal.  · Left ventricular septal hypokinesis  · No significant valvular heart disease      Plan for Follow-up of Pending Labs/Results:  ngtd - ID clinic to f/u wound cx  Pending Labs       Order Current Status    Blood Culture - Blood, Arm, Right Preliminary result    Blood Culture - Blood, Hand, Right Preliminary result    Wound Culture - Wound, Foot, Left Preliminary result          Discharge Details        Discharge Medications        New Medications        Instructions Start Date   cefTRIAXone  Commonly known as: ROCEPHIN   2,000 mg, Intravenous, Every 24 Hours             Changes to Medications        Instructions Start Date   insulin detemir 100 UNIT/ML injection  Commonly known as: LEVEMIR  What changed: when to take this   8 Units, Subcutaneous, Nightly             Continue These Medications        Instructions Start Date   acetaminophen 325 MG tablet  Commonly known as: TYLENOL   650 mg, Oral, Every 6 Hours PRN      apixaban 5 MG tablet tablet  Commonly known as: ELIQUIS   5 mg, Oral, Every 12 Hours Scheduled      aspirin 81 MG chewable tablet   81 mg, Oral, Daily      atorvastatin 20 MG tablet  Commonly known as: LIPITOR   20 mg, Oral, Nightly      bumetanide 1 MG tablet  Commonly known as: BUMEX   1 mg, Oral, 2 Times Daily      carvedilol 3.125 MG tablet  Commonly known as: COREG   3.125 mg, Oral, 2 Times Daily With Meals      Diclofenac Sodium 1 % gel gel  Commonly known as: VOLTAREN   2 g, Topical, 4 Times Daily PRN      famotidine 20 MG tablet  Commonly known as: PEPCID   20 mg, Oral, Daily      glucose blood test strip  Commonly known as: OneTouch Verio   1 each, Other, 2 Times Daily, Use as instructed      Insulin Lispro 100 UNIT/ML injection  Commonly known as: humaLOG   3 Units, Subcutaneous, 3 Times Daily Before Meals, Under 150 3 unit 150-200 4 units 200-250- 5 unit      levothyroxine 88 MCG tablet  Commonly known as: SYNTHROID, LEVOTHROID   88 mcg, Oral, Every Early Morning      linaclotide 290 MCG capsule capsule  Commonly known as: Linzess   290 mcg, Oral, Every Morning Before Breakfast      losartan 100 MG tablet  Commonly  known as: COZAAR   1 tablet, Oral, Daily      magnesium oxide 400 MG tablet  Commonly known as: MAG-OX   400 mg, Oral, Daily      nitroglycerin 0.4 MG SL tablet  Commonly known as: NITROSTAT   0.4 mg, Sublingual, Every 5 Minutes PRN, Take no more than 3 doses in 15 minutes.      nystatin 092417 UNIT/GM powder  Commonly known as: MYCOSTATIN   Topical, 2 Times Daily      Rollator Ultra-Light misc   1 each, Does not apply, Daily      tamsulosin 0.4 MG capsule 24 hr capsule  Commonly known as: FLOMAX   0.4 mg, Oral, Daily      vitamin D3 125 MCG (5000 UT) tablet tablet   5,000 Units, Oral, Daily      Xiidra 5 % ophthalmic solution  Generic drug: Lifitegrast   1 drop, Both Eyes, 2 Times Daily               Allergies   Allergen Reactions    Vancomycin Other (See Comments)     Kidney failure per last admission         Discharge Disposition:  Home or Self Care    Diet:  Hospital:  Diet Order   Procedures    Diet: Diabetic Diets; Consistent Carbohydrate; Texture: Regular Texture (IDDSI 7); Fluid Consistency: Thin (IDDSI 0)       Activity:  Activity Instructions    Protected weight bearing of left lower extremity. Use walkers as instructed           Restrictions or Other Recommendations:  none       CODE STATUS:    Code Status and Medical Interventions:   Ordered at: 07/23/23 2214     Code Status (Patient has no pulse and is not breathing):    CPR (Attempt to Resuscitate)     Medical Interventions (Patient has pulse or is breathing):    Full Support     Release to patient:    Routine Release       Future Appointments   Date Time Provider Department Center   7/27/2023 12:30 PM PAT 1 [facility]   8/23/2023 10:30 AM Farhana Jain APRN [facility]   10/11/2023 12:30 PM Marysol Shrestha MD [facility]       Additional Instructions for the Follow-ups that You Need to Schedule       Ambulatory Referral to Physical Therapy Evaluate and treat   As directed      Specialty needed: Evaluate and treat   Follow-up  needed: Yes        Discharge Follow-up with Specified Provider: ID clinic 7/27 w Dr Padilla   As directed      To: ID clinic 7/27 w Dr Valerie Blackwood MD  07/26/23      Time Spent on Discharge:  I spent  35 minutes on this discharge activity which included: face-to-face encounter with the patient, reviewing the data in the system, coordination of the care with the nursing staff as well as consultants, documentation, and entering orders.  D/w Dr Padilla, d/w patient, bedside nurse

## 2023-07-26 NOTE — DISCHARGE PLACEMENT REQUEST
"Jermaine Dawn \"Adi\" (78 y.o. Male)       Date of Birth   1945    Social Security Number       Address   [address] [facility] [address]    Home Phone   [phone]    MRN   [id number]       Latter-day   None    Marital Status                               Admission Date   7/23/23    Admission Type   Emergency    Admitting Provider   Angelica Blackwood MD    Attending Provider   Angelica Blackwood MD    Department, Room/Bed   [facility] 3G, S352/1       Discharge Date       Discharge Disposition   Home or Self Care    Discharge Destination                                 Attending Provider: Angelica Blackwood MD    Allergies: Vancomycin    Isolation: None   Infection: None   Code Status: CPR    Ht: 190.5 cm (75\")   Wt: 81.3 kg (179 lb 3.2 oz)    Admission Cmt: None   Principal Problem: Diabetic foot infection [E11.628,L08.9]                   Active Insurance as of 7/23/2023       Primary Coverage       Payor Plan Insurance Group Employer/Plan Group    MEDICARE MEDICARE A & B        Payor Plan Address Payor Plan Phone Number Payor Plan Fax Number Effective Dates    [address] [phone]  3/1/2010 - None Entered    [facility] [address]         Subscriber Name Subscriber Birth Date Member ID       JERMAINE DAWN 1945 [id number]               Secondary Coverage       Payor Plan Insurance Group Employer/Plan Group    HUMANA HUMANA  SUP              [id number]       Payor Plan Address Payor Plan Phone Number Payor Plan Fax Number Effective Dates    [address] [address]   8/1/2020 - None Entered    [facility] [address]         Subscriber Name Subscriber Birth Date Member ID       JERMAINE DAWN 1945 [id number]                     Emergency Contacts        (Rel.) Home Phone Work Phone Mobile Phone    HIPOLITO DAWN (Spouse) [phone] -- [phone]    RUSTYTHOR (Daughter) [phone] -- [phone]              Insurance Information                  MEDICARE/MEDICARE " A & B Phone: [phone]    Subscriber: Jermaine Singh Subscriber#: [id number]    Group#: -- Precert#: --        HUMANA/HUMANA MC SUP              Phone: --    Subscriber: Jermaine Singh Subscriber#: [id number]    Group#: [id number] Precert#: --                    [address]  [address] [facility] [address]  Phone:  [phone]  Fax:   Date: 2023      Ambulatory Referral to Physical Therapy Evaluate and treat     Patient:  Jermaine Singh MRN:  [id number]   [address] [facility] [facility] [address] :  1945  SSN:    Phone: [phone] Sex:  M      INSURANCE PAYOR PLAN GROUP # SUBSCRIBER ID   Primary:  Secondary:    MEDICARE  HUMANA [id number]  [id number]    [id number]  [id number]      Referring Provider Information:  PREMA THEODORE Phone: [phone] Fax: [phone]       Referral Information:   # Visits:  1 Referral Type: Physical Therapy [AE1]   Urgency:  Routine Referral Reason: Specialty Services Required   Start Date: 2023 End Date:  To be determined by Insurer   Diagnosis: Cellulitis of left foot (L03.116 [ICD-10-CM] 682.7 [ICD-9-CM])  Chest pain, unspecified type (R07.9 [ICD-10-CM] 786.50 [ICD-9-CM])  Acute on chronic congestive heart failure, unspecified heart failure type (I50.9 [ICD-10-CM] 428.0 [ICD-9-CM])  Elevated troponin (R77.8 [ICD-10-CM] 790.6 [ICD-9-CM])  Moderate malnutrition (E44.0 [ICD-10-CM] 263.0 [ICD-9-CM])  PAF (paroxysmal atrial fibrillation) (I48.0 [ICD-10-CM] 427.31 [ICD-9-CM])  Status post amputation of toe of left foot (Z89.422 [ICD-10-CM] V49.72 [ICD-9-CM])  Diabetic foot infection (E11.628,L08.9 [ICD-10-CM] 250.80,686.9 [ICD-9-CM])  Right inguinal hernia (K40.90 [ICD-10-CM] 550.90 [ICD-9-CM])  Stage 3b chronic kidney disease (N18.32 [ICD-10-CM] 585.3 [ICD-9-CM])  GERD without esophagitis (K21.9 [ICD-10-CM] 530.81 [ICD-9-CM])  Status post amputation of great toe, left (Z89.412 [ICD-10-CM] V49.71 [ICD-9-CM])  PVD  (peripheral vascular disease) (I73.9 [ICD-10-CM] 443.9 [ICD-9-CM])  Anemia, chronic disease (D63.8 [ICD-10-CM] 285.29 [ICD-9-CM])  Cellulitis of right lower extremity (L03.115 [ICD-10-CM] 682.6 [ICD-9-CM])  Chronic HFrEF (heart failure with reduced ejection fraction) (I50.22 [ICD-10-CM] 428.22 [ICD-9-CM])  SSS (sick sinus syndrome) (I49.5 [ICD-10-CM] 427.81 [ICD-9-CM])  Atherosclerosis of native artery of both lower extremities with intermittent claudication (I70.213 [ICD-10-CM] 440.21 [ICD-9-CM])  S/P CABG x 3 (Z95.1 [ICD-10-CM] V45.81 [ICD-9-CM])  RBBB (I45.10 [ICD-10-CM] 426.4 [ICD-9-CM])  Vitamin D deficiency (E55.9 [ICD-10-CM] 268.9 [ICD-9-CM])  Hypothyroidism (acquired) (E03.9 [ICD-10-CM] 244.9 [ICD-9-CM])  Mixed hyperlipidemia (E78.2 [ICD-10-CM] 272.2 [ICD-9-CM])  Primary hypertension (I10 [ICD-10-CM] 401.9 [ICD-9-CM])  Type 2 diabetes mellitus with other circulatory complication, with long-term current use of insulin (E11.59,Z79.4 [ICD-10-CM] 250.70,V58.67 [ICD-9-CM])      Refer to Dept:   Refer to Provider:   Refer to Provider Phone:   Refer to Facility:       Specialty needed: Evaluate and treat  Follow-up needed: Yes     This document serves as a request of services and does not constitute Insurance authorization or approval of services.  To determine eligibility, please contact the members Insurance carrier to verify and review coverage.     If you have medical questions regarding this request for services. Please contact          [address] at [phone] during normal business hours.        Authorizing Provider:Angelica Blackwood MD  Authorizing Provider's NPI: [id number]  Order Entered By: Jenna Coe RN 2023  1:11 PM     Electronically signed by: Angelica Blackwood MD 2023  1:11 PM        History & Physical        Fariba Olmos MD at 23 2214              [facility] Medicine Services  HISTORY AND PHYSICAL    Patient Name: Jermaine Singh  :  1945  MRN: [id number]  Primary Care Physician: Marysol Shrestha MD  Date of admission: 7/23/2023      Subjective   Subjective     Chief Complaint:  Left diabetic foot wound    HPI:  Jermaine Singh is a 78 y.o. male vasculopath with type 2 diabetes mellitus and associated neuropathy who underwent prior left great toe and second toe amputations for wet gangrene and osteomyelitis (Dr. Gross CT surgery, Dr. Braga ID).  Patient presents to [facility] ED complaining of left forefoot edema and erythema with developing wound patient states that he performs foot checks and on 7/21/2023 states this current infection was not present.    Patient does not wear it been orthotic shoe, he wears sandals to avoid pressure on his feet.  Patient denies any recent trauma or falls, denies any recent changes in footwear.  No fever, chills, rigors.  Patient has significant neuropathy without feeling to the area.      Review of Systems   Gen- No fevers, chills, HA, uncontrolled pain  CV- No chest pain, palpitations, new edema  Resp- No SOA, cough  GI- No N/V/D, abd pain, constipation      Personal History     Past Medical History:   Diagnosis Date    Allergic     Arthritis     Asthma     Coronary artery disease     Diabetes mellitus 2000    started on inuslin 12/2018; started on po meds in 2000; checking blood sugars daily     Disease of thyroid gland     po meds daily for hypothyroidism     Elevated serum creatinine 11/15/2022    Elevated troponin 10/02/2022    Generalized weakness 11/15/2022    History of fracture as a child     rt leg- severe     Hyperlipidemia     Hypertension     Hypothyroidism     PAF (paroxysmal atrial fibrillation) 07/23/2023    Peripheral neuropathy     Peripheral vascular disease     s/p angiogram 2/19-needs stent in left leg     Pleural effusion, bilateral 11/15/2022    Proximal phalanx fracture of the second digit extending into the second metatarsal joint 07/28/2022    RBBB     Right  knee pain     Unstable angina 02/12/2019    Added automatically from request for surgery [id number]    Vitamin D deficiency              Past Surgical History:   Procedure Laterality Date    AMPUTATION DIGIT Left 01/17/2023    Procedure: AMPUTATION DIGIT LEFT;  Surgeon: Gopal Márquez MD;  Location:  [facility];  Service: Vascular;  Laterality: Left;    APPENDECTOMY      CARDIAC CATHETERIZATION N/A 02/14/2019    Procedure: Left Heart Cath;  Surgeon: Cooper Apodaca MD;  Location: [facility] CATH INVASIVE LOCATION;  Service: Cardiology    CARDIAC ELECTROPHYSIOLOGY PROCEDURE N/A 05/18/2022    Procedure: DEVICE IMPLANT;  Surgeon: Cooper Apodaca MD;  Location: [facility] CATH INVASIVE LOCATION;  Service: Cardiology;  Laterality: N/A;    CARDIAC SURGERY      COLONOSCOPY      CORONARY ARTERY BYPASS GRAFT N/A 03/22/2019    Procedure: MEDIAN STERNOTOMY, CORONARY ARTERY BYPASS GRAFT X3, UTILIZING THE LEFT INTERNAL MAMMARY ARTERY, EVH AND OPEN HARVEST OF THE RIGHT GREATER SAPHENOUS VEIN, EXPLORATION OF THE LEFT LEG;  Surgeon: Ap Au MD;  Location: [facility] OR;  Service: Cardiothoracic    EYE SURGERY Bilateral     cataracts     FRACTURE SURGERY      INTERVENTIONAL RADIOLOGY PROCEDURE N/A 07/29/2021    Procedure: Abdominal Aortagram with Runoff;  Surgeon: Jermaine Hernandez MD;  Location: [facility] CATH INVASIVE LOCATION;  Service: Cardiovascular;  Laterality: N/A;    KNEE ARTHROSCOPY      right x 2, left x 1    LACERATION REPAIR      right leg    LEG SURGERY      2 for fracture of rt leg     TONSILLECTOMY      Adnoidectomy    TRANS METATARSAL AMPUTATION Left 08/02/2022    Procedure: GREAT TOE AMPUTATION LEFT;  Surgeon: Gopal Márquez MD;  Location:  [facility] OR;  Service: Vascular;  Laterality: Left;       Family History: family history includes Cancer in his father; Coronary artery disease in his mother; Diabetes in his mother; Hyperlipidemia in his mother.     Social History:  reports that he has never smoked. He has  been exposed to tobacco smoke. He has never used smokeless tobacco. He reports that he does not currently use alcohol. He reports that he does not use drugs.  Social History     Social History Narrative    Lives in [place].       Medications:  Available home medication information reviewed.  Medications Prior to Admission   Medication Sig Dispense Refill Last Dose    acetaminophen (TYLENOL) 325 MG tablet Take 2 tablets by mouth Every 6 (Six) Hours As Needed for Mild Pain.       apixaban (ELIQUIS) 5 MG tablet tablet Take 1 tablet by mouth Every 12 (Twelve) Hours. Indications: DVT/PE (active thrombosis) 180 tablet 0     aspirin 81 MG chewable tablet Chew 1 tablet Daily. 90 tablet 3     atorvastatin (LIPITOR) 20 MG tablet Take 1 tablet by mouth Every Night. 90 tablet 3     bumetanide (BUMEX) 1 MG tablet Take 1 tablet by mouth 2 (Two) Times a Day. (Patient taking differently: Take 1 tablet by mouth 2 (Two) Times a Day. Qd-bid) 60 tablet 6     carvedilol (COREG) 3.125 MG tablet Take 1 tablet by mouth 2 (Two) Times a Day With Meals.       Cholecalciferol (VITAMIN D3) 5000 units tablet tablet Take 1 tablet by mouth Daily.       Diclofenac Sodium (VOLTAREN) 1 % gel gel Apply 2 g topically to the appropriate area as directed 4 (Four) Times a Day As Needed (pain).       famotidine (PEPCID) 20 MG tablet Take 1 tablet by mouth Daily. 90 tablet 3     glucose blood (OneTouch Verio) test strip 1 each by Other route 2 (Two) Times a Day. Use as instructed 100 each 3     insulin detemir (LEVEMIR) 100 UNIT/ML injection Inject 8 Units under the skin into the appropriate area as directed Every Night. (Patient taking differently: Inject 8 Units under the skin into the appropriate area as directed Daily.)  12     Insulin Lispro (humaLOG) 100 UNIT/ML injection Inject 3 Units under the skin into the appropriate area as directed 3 (Three) Times a Day Before Meals. Under 150 3 unit  150-200 4 units  200-250- 5 unit       levothyroxine  (SYNTHROID, LEVOTHROID) 88 MCG tablet Take 1 tablet by mouth Every Morning. 90 tablet 3     linaclotide (Linzess) 290 MCG capsule capsule Take 1 capsule by mouth Every Morning Before Breakfast. 30 capsule 2     losartan (COZAAR) 100 MG tablet Take 1 tablet by mouth Daily.       magnesium oxide (MAG-OX) 400 MG tablet Take 1 tablet by mouth Daily. 90 tablet 3     Misc. Devices (Rollator Ultra-Light) misc 1 each Daily. 1 each 0     nitroglycerin (NITROSTAT) 0.4 MG SL tablet Place 1 tablet under the tongue Every 5 (Five) Minutes As Needed for Chest Pain. Take no more than 3 doses in 15 minutes. 9 tablet 12     nystatin (MYCOSTATIN) 044874 UNIT/GM powder Apply  topically to the appropriate area as directed 2 (Two) Times a Day. 60 g 5     tamsulosin (FLOMAX) 0.4 MG capsule 24 hr capsule Take 1 capsule by mouth Daily. 90 capsule 1     Xiidra 5 % ophthalmic solution Administer 1 drop to both eyes 2 (Two) Times a Day.          Allergies   Allergen Reactions    Vancomycin Other (See Comments)     Kidney failure per last admission       Objective   Objective     Vital Signs:   Temp:  [98.5 °F (36.9 °C)] 98.5 °F (36.9 °C)  Heart Rate:  [55-97] 55  Resp:  [19-20] 19  BP: ()/(77-91) 90/77       Physical Exam   Constitutional: No acute distress, awake, alert, chronically ill-appearing, thin body habitus  Respiratory: Clear to auscultation bilaterally, good effort, nonlabored respirations   Cardiovascular: RRR, no murmur  Musculoskeletal: Chronic trace right distal leg and ankle edema, normal muscle tone for age  Psychiatric: Appropriate affect, good insight and judgement, cooperative  Neurologic: Oriented x 3, movements symmetric BUE and BLE, Cranial Nerves grossly intact, speech clear and fluent  Skin: Status post amputation of left great toe and second toe.  Left medial forefoot with wet appearing early gangrenous changes surrounded by spreading confluent erythema.  Surprisingly area is not currently hot however the dorsum  of the foot has desquamating skin with weeping area noted possibly a puncture site?      LAB RESULTS:      Lab 07/23/23 1823 07/23/23  1746   WBC  --  10.88*   HEMOGLOBIN  --  12.2*   HEMATOCRIT  --  39.0   PLATELETS  --  179   NEUTROS ABS  --  8.67*   IMMATURE GRANS (ABS)  --  0.03   LYMPHS ABS  --  0.97   MONOS ABS  --  0.86   EOS ABS  --  0.30   MCV  --  91.5   SED RATE  --  30*   CRP 3.60*  --    LACTATE  --  1.6         Lab 07/23/23 1823   SODIUM 142   POTASSIUM 4.8   CHLORIDE 105   CO2 25.0   ANION GAP 12.0   BUN 46*   CREATININE 1.65*   EGFR 42.2*   GLUCOSE 250*   CALCIUM 8.8         Lab 07/23/23 1823   TOTAL PROTEIN 6.4   ALBUMIN 3.4*   GLOBULIN 3.0   ALT (SGPT) 28   AST (SGOT) 25   BILIRUBIN 0.5   ALK PHOS 114   LIPASE 11*         Lab 07/23/23 2020 07/23/23 1823   PROBNP  --  33,171.0*   HSTROP T 104* 103*                 UA          8/23/2022    13:09 11/15/2022    10:03 1/21/2023    09:07   Urinalysis   Squamous Epithelial Cells, UA 3-6  0-2  0-2    Specific Gravity, UA 1.014  1.012  1.016    Ketones, UA Negative  Negative  15 mg/dL (1+)    Blood, UA Large (3+)  Moderate (2+)  Large (3+)    Leukocytes, UA Trace  Moderate (2+)  Large (3+)    Nitrite, UA Negative  Negative  Negative    RBC, UA Too Numerous to Count  13-20  3-6    WBC, UA 6-12  Too Numerous to Count  Too Numerous to Count    Bacteria, UA None Seen  Trace  3+        Microbiology Results (last 10 days)       ** No results found for the last 240 hours. **            XR Foot 3+ View Left    Result Date: 7/23/2023  XR FOOT 3+ VW LEFT Date of Exam: 7/23/2023 5:03 PM EDT Indication: Left foot swelling, redness, and blistering, previous partial forefoot amputation. Comparison: Left foot x-ray performed on 7/27/2022 and a left toe x-ray from 1/16/2023. Findings: The patient is status post amputation of the left great toe and the distal first metatarsal bone. The patient's had interval amputation of most of the left second toe. The base of the  proximal phalanx remains. There is no cortical destruction or suspicious periostitis to indicate radiographic changes of osteomyelitis. There is mature periosteal reaction along the shafts of the second through fourth metatarsal bones which can be seen with venous stasis. There is mild spurring within the midfoot consistent with arthritis. There is a prominent calcaneal enthesophyte. There are scattered vascular calcifications.     Impression: Impression: 1. Status post amputation procedures of the left foot as described. 2. No radiographic changes of osteomyelitis. 3. Additional incidental findings as noted above. Electronically Signed: Pan Sharp  7/23/2023 5:22 PM EDT  Workstation ID: [id number]    XR Chest 1 View    Result Date: 7/23/2023  XR CHEST 1 VW Date of Exam: 7/23/2023 4:47 PM EDT Indication: Chest Pain Triage Protocol Comparison: 1/16/2023 Findings: Patient is status post median sternotomy and CABG. Left chest wall pacemaker is present. Cardiac silhouette is enlarged. No consolidation or mass is seen. No pleural effusion or pneumothorax is seen. No acute osseous lesion is seen.     Impression: Impression: 1.Cardiomegaly. No acute radiographic abnormality is identified. Electronically Signed: Lucio Lanier  7/23/2023 4:54 PM EDT  Workstation ID: [id number]     Results for orders placed during the hospital encounter of 07/27/22    Adult Transthoracic Echo Complete W/ Cont if Necessary Per Protocol    Interpretation Summary  · Left ventricular ejection fraction appears to be 46 - 50%. Left ventricular systolic function is low normal.  · Left ventricular septal hypokinesis  · No significant valvular heart disease      Assessment & Plan   Assessment & Plan     Active Hospital Problems    Diagnosis  POA    **Diabetic foot infection [E11.628, L08.9]  Yes    Status post amputation of great toe and second toe of left foot [Z89.422]  Not Applicable    PAF (paroxysmal atrial fibrillation) [I48.0]  Yes    PVD  "(peripheral vascular disease) [I73.9]  Yes    Stage 3b chronic kidney disease [N18.32]  Yes    Chronic HFrEF (heart failure with reduced ejection fraction) [I50.22]  Yes    SSS (sick sinus syndrome) [I49.5]  Yes     Added automatically from request for surgery [id number]      Atherosclerosis of native artery of both lower extremities with intermittent claudication [I70.213]  Yes     Added automatically from request for surgery [id number]      Type 2 diabetes mellitus [E11.9]  Yes     HA1C on 3/21/19 9.0 improved from 11.1 on 1/8/19      Hypertension [I10]  Yes    Hyperlipidemia [E78.5]  Yes    Hypothyroidism (acquired) [E03.9]  Yes    S/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 Au [Z95.1]  Not Applicable    Diabetic neuropathy [E11.40]  Yes       Left forefoot diabetic wound infection with secondary cellulitis  Hx of amputation left great toe and second toe (Dr. Márquez)  - per patient area of infection new since 7/21/23  - wears \"sandals\" but not orthopedic shoes, denies known injury but has severe neuropathy  - Rocephin and Linezolid (Vanc allergic)  - patient known to Gladis Braga of ID and Willi of CT/Vasc Surgery -- consults placed  - HOLD Eliquis for now...    DM2  Diabetic neuropathy  - last A1c was January, recheck added to blood in lab  - continue qam Levemir home dose, add moderate dose SSI    CKD IIIb  - baseline creat ~1.6 - 1.7 and GFR ~40  PVD  CAD s/p CABG  Chronic diastolic CHF  HTN  HL  Hypothyroid  - chronic / stable  - home meds as ordered      DVT prophylaxis:  HOLD Eliquis for now...      CODE STATUS:    Code Status and Medical Interventions:   Ordered at: 07/23/23 2678     Code Status (Patient has no pulse and is not breathing):    CPR (Attempt to Resuscitate)     Medical Interventions (Patient has pulse or is breathing):    Full Support     Release to patient:    Routine Release       Expected Discharge 7/27/23  Expected discharge date/ time has not been documented.     Fariba Olmos, " MD  23      Electronically signed by Fariba Olmos MD at 23 5752          Physical Therapy Notes (most recent note)        Macrina Asher, PT at 23 1522  Version 1 of 1         Patient Name: Jermaine Singh  : 1945    MRN: [id number]                              Today's Date: 2023       Admit Date: 2023    Visit Dx:     ICD-10-CM ICD-9-CM   1. Cellulitis of left foot  L03.116 682.7   2. Chest pain, unspecified type  R07.9 786.50   3. Acute on chronic congestive heart failure, unspecified heart failure type  I50.9 428.0   4. 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 Au    Atherosclerosis of native artery of both lower extremities with intermittent claudication    SSS (sick sinus syndrome)    Chronic HFrEF (heart failure with reduced ejection fraction)    DVT, bilateral lower limbs    Cellulitis of right lower extremity    Anemia, chronic disease    PVD (peripheral vascular disease)    Status post amputation of great toe, left    Diabetic foot ulcer    GERD without esophagitis    Stage 3b chronic kidney disease    Arthralgia of shoulder    Right inguinal hernia    Diabetic foot infection    Status post amputation of great toe and second toe of left foot    PAF (paroxysmal atrial fibrillation)    Moderate malnutrition     Past Medical History:   Diagnosis Date    Allergic     Arthritis     Asthma     Coronary artery disease     Diabetes mellitus 2000    started on inuslin 2018; started on po meds in ; checking blood sugars daily     Disease of thyroid gland     po meds daily for hypothyroidism     Elevated serum creatinine 11/15/2022    Elevated troponin 10/02/2022    Generalized weakness 11/15/2022    History of fracture as a child     rt leg-  severe     Hyperlipidemia     Hypertension     Hypothyroidism     PAF (paroxysmal atrial fibrillation) 07/23/2023    Peripheral neuropathy     Peripheral vascular disease     s/p angiogram 2/19-needs stent in left leg     Pleural effusion, bilateral 11/15/2022    Proximal phalanx fracture of the second digit extending into the second metatarsal joint 07/28/2022    RBBB     Right knee pain     Unstable angina 02/12/2019    Added automatically from request for surgery [id number]    Vitamin D deficiency      Past Surgical History:   Procedure Laterality Date    AMPUTATION DIGIT Left 01/17/2023    Procedure: AMPUTATION DIGIT LEFT;  Surgeon: Gopal Márquez MD;  Location:  [facility];  Service: Vascular;  Laterality: Left;    APPENDECTOMY      CARDIAC CATHETERIZATION N/A 02/14/2019    Procedure: Left Heart Cath;  Surgeon: Cooper Apodaca MD;  Location: [facility] CATH INVASIVE LOCATION;  Service: Cardiology    CARDIAC ELECTROPHYSIOLOGY PROCEDURE N/A 05/18/2022    Procedure: DEVICE IMPLANT;  Surgeon: Cooper Apodaca MD;  Location: [facility] CATH INVASIVE LOCATION;  Service: Cardiology;  Laterality: N/A;    CARDIAC SURGERY      COLONOSCOPY      CORONARY ARTERY BYPASS GRAFT N/A 03/22/2019    Procedure: MEDIAN STERNOTOMY, CORONARY ARTERY BYPASS GRAFT X3, UTILIZING THE LEFT INTERNAL MAMMARY ARTERY, EVH AND OPEN HARVEST OF THE RIGHT GREATER SAPHENOUS VEIN, EXPLORATION OF THE LEFT LEG;  Surgeon: Ap Au MD;  Location: [facility] OR;  Service: Cardiothoracic    EYE SURGERY Bilateral     cataracts     FRACTURE SURGERY      INTERVENTIONAL RADIOLOGY PROCEDURE N/A 07/29/2021    Procedure: Abdominal Aortagram with Runoff;  Surgeon: Jermaine Hernandez MD;  Location:  [facility] CATH INVASIVE LOCATION;  Service: Cardiovascular;  Laterality: N/A;    KNEE ARTHROSCOPY      right x 2, left x 1    LACERATION REPAIR      right leg    LEG SURGERY      2 for fracture of rt leg     TONSILLECTOMY      Adnoidectomy    TRANS METATARSAL  AMPUTATION Left 08/02/2022    Procedure: GREAT TOE AMPUTATION LEFT;  Surgeon: Gopal Márquez MD;  Location: [facility];  Service: Vascular;  Laterality: Left;      General Information       Row Name 07/25/23 1556          Physical Therapy Time and Intention    Document Type evaluation  -AB     Mode of Treatment physical therapy  -AB       Row Name 07/25/23 1556          General Information    Patient Profile Reviewed yes  -AB     Prior Level of Function independent:;all household mobility;community mobility;gait;transfer;bed mobility;ADL's;using stairs  Currently attending OPPT. Using RW for household distances. Rollator for community ambulation.  -AB     Existing Precautions/Restrictions fall;other (see comments)  Protected WB of LLE as clarified by Dr. Blackwood.  -AB     Barriers to Rehab medically complex  -AB       Row Name 07/25/23 1556          Living Environment    People in Home spouse  -AB       Row Name 07/25/23 1556          Home Main Entrance    Number of Stairs, Main Entrance four  -AB     Stair Railings, Main Entrance railings on both sides of stairs  -AB       Row Name 07/25/23 1556          Stairs Within Home, Primary    Number of Stairs, Within Home, Primary none  -AB       Row Name 07/25/23 1556          Cognition    Orientation Status (Cognition) oriented x 4  -AB       Row Name 07/25/23 1556          Safety Issues, Functional Mobility    Safety Issues Affecting Function (Mobility) awareness of need for assistance;insight into deficits/self-awareness;safety precaution awareness;safety precautions follow-through/compliance  -AB     Impairments Affecting Function (Mobility) balance;endurance/activity tolerance;strength;sensation/sensory awareness  -AB               User Key  (r) = Recorded By, (t) = Taken By, (c) = Cosigned By      Initials Name Provider Type    AB Macrina Asher, PT Physical Therapist                   Mobility       Row Name 07/25/23 1551          Bed Mobility    Comment, (Bed  Mobility) Received and left UIC.  -AB       Row Name 07/25/23 1557          Transfers    Comment, (Transfers) Cues for hand placement and sequencing. Pt denied pain in L foot throughout session and no order noted for off loading shoe at this time.  -AB       Row Name 07/25/23 1557          Bed-Chair Transfer    Bed-Chair [place] (Transfers) unable to assess  -AB       Row Name 07/25/23 1557          Sit-Stand Transfer    Sit-Stand [place] (Transfers) contact guard;1 person assist;verbal cues  -AB     Assistive Device (Sit-Stand Transfers) walker, front-wheeled  -AB     Comment, (Sit-Stand Transfer) Cues to push up from chair.  -AB       Row Name 07/25/23 1557          Gait/Stairs (Locomotion)    [place] Level (Gait) contact guard;1 person assist;verbal cues  -AB     Assistive Device (Gait) walker, front-wheeled  -AB     Distance in Feet (Gait) 350'  -AB     Deviations/Abnormal Patterns (Gait) bilateral deviations;ashli decreased;gait speed decreased;stride length decreased  -AB     Bilateral Gait Deviations forward flexed posture  -AB     [place] Level (Stairs) unable to assess  -AB     Comment, (Gait/Stairs) Pt demo's step through gait pattern with decreased stride length and slowed ashli. Cues for upright posture and walker positioning. No overt LOB. Further activity limited by fatigue.  -AB       Row Name 07/25/23 1557          Mobility    Extremity Weight-bearing Status left lower extremity  -AB     Left Lower Extremity (Weight-bearing Status) weight-bearing as tolerated (WBAT);other (see comments)  Protected WBAT  -AB               User Key  (r) = Recorded By, (t) = Taken By, (c) = Cosigned By      Initials Name Provider Type    AB Macrina Asher, PT Physical Therapist                   Obj/Interventions       Row Name 07/25/23 1600          Range of Motion Comprehensive    General Range of Motion bilateral lower extremity ROM WFL  -AB       Row Name 07/25/23 1600          Strength  Comprehensive (MMT)    General Manual Muscle Testing (MMT) Assessment lower extremity strength deficits identified  -AB     Comment, General Manual Muscle Testing (MMT) Assessment BLE strength grossly 4/5  -AB       Row Name 07/25/23 1600          Balance    Balance Assessment sitting static balance;sitting dynamic balance;standing static balance;standing dynamic balance  -AB     Static Sitting Balance independent  -AB     Dynamic Sitting Balance standby assist  -AB     Position, Sitting Balance unsupported;sitting in chair  -AB     Static Standing Balance contact guard;1-person assist  -AB     Dynamic Standing Balance contact guard;1-person assist;verbal cues  -AB     Position/Device Used, Standing Balance supported;walker, front-wheeled  -AB     Balance Interventions sitting;standing;sit to stand;supported;static;dynamic  -AB     Comment, Balance No overt LOB.  -AB       Row Name 07/25/23 1600          Sensory Assessment (Somatosensory)    Sensory Assessment (Somatosensory) bilateral LE  -AB     Bilateral LE Sensory Assessment impaired  Peripheral neuropathy  -AB               User Key  (r) = Recorded By, (t) = Taken By, (c) = Cosigned By      Initials Name Provider Type    AB Macrina Asher, PT Physical Therapist                   Goals/Plan       Row Name 07/25/23 1603          Bed Mobility Goal 1 (PT)    Activity/Assistive Device (Bed Mobility Goal 1, PT) supine to sit;sit to supine  -AB     [place] Level/Cues Needed (Bed Mobility Goal 1, PT) independent  -AB     Time Frame (Bed Mobility Goal 1, PT) long term goal (LTG);10 days  -AB       Row Name 07/25/23 1608          Transfer Goal 1 (PT)    Activity/Assistive Device (Transfer Goal 1, PT) sit-to-stand/stand-to-sit;bed-to-chair/chair-to-bed;walker, rolling  -AB     [place] Level/Cues Needed (Transfer Goal 1, PT) modified independence  -AB     Time Frame (Transfer Goal 1, PT) long term goal (LTG);10 days  -AB       Row Name 07/25/23 160           Gait Training Goal 1 (PT)    Activity/Assistive Device (Gait Training Goal 1, PT) gait (walking locomotion);assistive device use;walker, rolling  -AB     [place] Level (Gait Training Goal 1, PT) modified independence  -AB     Distance (Gait Training Goal 1, PT) 400  -AB     Time Frame (Gait Training Goal 1, PT) long term goal (LTG);10 days  -AB       Row Name 07/25/23 1603          Stairs Goal 1 (PT)    Activity/Assistive Device (Stairs Goal 1, PT) ascending stairs;descending stairs;using handrail, left;using handrail, right  -AB     [place] Level/Cues Needed (Stairs Goal 1, PT) contact guard required  -AB     Number of Stairs (Stairs Goal 1, PT) 4  -AB     Time Frame (Stairs Goal 1, PT) long term goal (LTG);10 days  -AB       Row Name 07/25/23 1603          Therapy Assessment/Plan (PT)    Planned Therapy Interventions (PT) balance training;bed mobility training;gait training;home exercise program;patient/family education;postural re-education;transfer training;stretching;strengthening;stair training;ROM (range of motion)  -AB               User Key  (r) = Recorded By, (t) = Taken By, (c) = Cosigned By      Initials Name Provider Type    AB Macrina Asehr, PT Physical Therapist                   Clinical Impression       Row Name 07/25/23 1601          Pain    Pretreatment Pain Rating 0/10 - no pain  -AB     Posttreatment Pain Rating 0/10 - no pain  -AB     Additional Documentation Pain Scale: FACES Pre/Post-Treatment (Group)  -AB       Row Name 07/25/23 1601          Pain Scale: FACES Pre/Post-Treatment    Pain: FACES Scale, Pretreatment 0-->no hurt  -AB     Posttreatment Pain Rating 0-->no hurt  -AB       [facility] Name 07/25/23 1601          Plan of Care Review    Plan of Care Reviewed With patient  -AB     Progress no change  -AB     Outcome Evaluation PT initial eval completed. Pt presents below his functional baseline with decreased strength, mild balance deficits, and impaired endurance. Ambulation of  350' with CGA and RW was well tolerated. Pt will benefit from further IPPT for addressing deficits. PT rec d/c home with assist and OPPT when medically appropriate.  -AB       Row Name 07/25/23 1601          Therapy Assessment/Plan (PT)    Rehab Potential (PT) good, to achieve stated therapy goals  -AB     Criteria for Skilled Interventions Met (PT) yes;meets criteria;skilled treatment is necessary  -AB     Therapy Frequency (PT) daily  -AB       Row Name 07/25/23 1601          Vital Signs    Pre Systolic BP Rehab 117  -AB     Pre Treatment Diastolic BP 67  -AB     Post Systolic BP Rehab 153  -AB     Post Treatment Diastolic BP 89  -AB     O2 Delivery Pre Treatment room air  -AB     O2 Delivery Intra Treatment room air  -AB     O2 Delivery Post Treatment room air  -AB     Pre Patient Position Sitting  -AB     Intra Patient Position Standing  -AB     Post Patient Position Sitting  -AB       Row Name 07/25/23 1601          Positioning and Restraints    Pre-Treatment Position sitting in chair/recliner  -AB     Post Treatment Position chair  -AB     In Chair notified nsg;reclined;sitting;call light within reach;encouraged to call for assist;exit alarm on;legs elevated;waffle cushion  -AB               User Key  (r) = Recorded By, (t) = Taken By, (c) = Cosigned By      Initials Name Provider Type    AB Macrina Asher, PT Physical Therapist                   Outcome Measures       Row Name 07/25/23 1604          How much help from another person do you currently need...    Turning from your back to your side while in flat bed without using bedrails? 4  -AB     Moving from lying on back to sitting on the side of a flat bed without bedrails? 3  -AB     Moving to and from a bed to a chair (including a wheelchair)? 3  -AB     Standing up from a chair using your arms (e.g., wheelchair, bedside chair)? 3  -AB     Climbing 3-5 steps with a railing? 3  -AB     To walk in hospital room? 3  -AB     AM-PAC 6 Clicks Score (PT) 19   -AB     Highest level of mobility 6 --> Walked 10 steps or more  -AB       Row Name 07/25/23 1604          Functional Assessment    Outcome Measure Options AM-PAC 6 Clicks Basic Mobility (PT)  -AB               User Key  (r) = Recorded By, (t) = Taken By, (c) = Cosigned By      Initials Name Provider Type    AB Macrina Asher, PT Physical Therapist                                 Physical Therapy Education       Title: PT OT SLP Therapies (In Progress)       Topic: Physical Therapy (In Progress)       Point: Mobility training (Done)       Learning Progress Summary             Patient Acceptance, E,D, VU,NR by AB at 7/25/2023 1604                         Point: Home exercise program (Not Started)       Learner Progress:  Not documented in this visit.              Point: Body mechanics (Done)       Learning Progress Summary             Patient Acceptance, E,D, VU,NR by AB at 7/25/2023 1604                         Point: Precautions (Done)       Learning Progress Summary             Patient Acceptance, E,D, VU,NR by AB at 7/25/2023 1604                                         User Key       Initials Effective Dates Name Provider Type Discipline    AB 09/22/22 -  Macrina Asher PT Physical Therapist PT                  PT Recommendation and Plan  Planned Therapy Interventions (PT): balance training, bed mobility training, gait training, home exercise program, patient/family education, postural re-education, transfer training, stretching, strengthening, stair training, ROM (range of motion)  Plan of Care Reviewed With: patient  Progress: no change  Outcome Evaluation: PT initial eval completed. Pt presents below his functional baseline with decreased strength, mild balance deficits, and impaired endurance. Ambulation of 350' with CGA and RW was well tolerated. Pt will benefit from further IPPT for addressing deficits. PT rec d/c home with assist and OPPT when medically appropriate.     Time Calculation:   PT  Evaluation Complexity  History, PT Evaluation Complexity: 1-2 personal factors and/or comorbidities  Examination of Body Systems (PT Eval Complexity): total of 3 or more elements  Clinical Presentation (PT Evaluation Complexity): evolving  Clinical Decision Making (PT Evaluation Complexity): moderate complexity  Overall Complexity (PT Evaluation Complexity): moderate complexity     PT Charges       Row Name 07/25/23 1604             Time Calculation    Start Time 1522  -AB      PT Received On 07/25/23  -AB      PT Goal Re-Cert Due Date 08/04/23  -AB         Untimed Charges    PT Eval/Re-eval Minutes 52  -AB         Total Minutes    Untimed Charges Total Minutes 52  -AB       Total Minutes 52  -AB                User Key  (r) = Recorded By, (t) = Taken By, (c) = Cosigned By      Initials Name Provider Type    AB Macrina Asher, PT Physical Therapist                  Therapy Charges for Today       Code Description Service Date Service Provider Modifiers Qty    80768949856 HC PT EVAL MOD COMPLEXITY 4 7/25/2023 Macrina Asher, PT GP 1            PT G-Codes  Outcome Measure Options: AM-PAC 6 Clicks Basic Mobility (PT)  AM-PAC 6 Clicks Score (PT): 19  PT Discharge Summary  Anticipated Discharge Disposition (PT): home with assist, home with outpatient therapy services    Macrina Asher PT  7/25/2023      Electronically signed by Macrina Asher, PT at 07/25/23 160       Discharge Summary    No notes of this type exist for this encounter.

## 2023-07-26 NOTE — PROGRESS NOTES
CTS Progress Note       LOS: 3 days   Patient Care Team:  Marysol Shrestha MD as PCP - General (Internal Medicine)  Jermaine Hernandez MD as Consulting Physician (Cardiology)    Chief Complaint: Diabetic foot infection    Vital Signs:  Temp:  [97.4 °F (36.3 °C)-98.1 °F (36.7 °C)] 97.9 °F (36.6 °C)  Heart Rate:  [59-74] 64  Resp:  [16-20] 20  BP: ()/(60-90) 112/70    Physical Exam:   Alert and oriented  Serosanguinous drainage from the dorsal aspect of the left first distal metatarsal      Results:     Results from last 7 days   Lab Units 07/25/23  0525   WBC 10*3/mm3 6.32   HEMOGLOBIN g/dL 11.2*   HEMATOCRIT % 34.4*   PLATELETS 10*3/mm3 166       Results from last 7 days   Lab Units 07/25/23  0525   SODIUM mmol/L 139   POTASSIUM mmol/L 4.4   CHLORIDE mmol/L 104   CO2 mmol/L 23.0   BUN mg/dL 47*   CREATININE mg/dL 1.61*   GLUCOSE mg/dL 165*   CALCIUM mg/dL 8.4*             Imaging Results (Last 24 Hours)       Procedure Component Value Units Date/Time    MRI Foot Left Without Contrast [[id number]] Collected: 07/25/23 1753     Updated: 07/25/23 1834    Narrative:        MRI FOOT LEFT WO CONTRAST    Date of Exam: 7/25/2023 5:14 PM EDT    Indication: Foot swelling, diabetic, osteomyelitis suspected, xray done.     Comparison: Radiographs July 23, 2023, January 16, 2023, July 27, 2022    Technique:  Routine multiplanar/multisequence sequence images of the left foot were obtained without contrast administration.      Findings:  There has been amputation of the first digit at the proximal metatarsal. There has been amputation of the second digit at the base of the proximal phalanx.    There is suspicion for a wound at the dorsal-medial aspect of the forefoot, distal to the first digit amputation site. There is T1 hypointense signal in the soft tissues in this location with heterogeneous T2 hyperintense signal. Evaluation for abscess   is limited by lack of intravenous contrast, but there is suspicion for a subtle  tract extending into the deep soft tissues with small focal fluid signal collection measuring approximately 17 x 7 x 11 mm. These findings can be seen on short axis series 7   image 13 and on long axis T2 images 12-14 of series 10.    There are foci of susceptibility artifact in the soft tissues in the area of suspected wound suspicious for a small amount of soft tissue gas. There is also foci of susceptibility artifact at the amputation sites suggesting postsurgical changes.    There is additional diffuse forefoot soft tissue edema. No other definite well-defined fluid signal collection is seen on this noncontrast study. There is mild diffuse muscle edema signal and mild to moderate muscle atrophy which could be seen with   denervation and possibly myositis.    There appears to be mild edema signal at the remaining base of the second proximal phalanx. Probable subchondral cyst formation is seen at the proximal aspect of the medial cuneiform, presumed to be related to arthropathy. No other significant marrow   edema signal is identified. There does not appear to be confluent T1 signal abnormality in the osseous structures of the foot at this time to indicate osteomyelitis. No definite acute fracture is seen. There are hammertoe deformities.         Impression:      Impression:  1.Status post amputation of the first digit at the proximal metatarsal and second digit at the base of the proximal phalanx.  2.Suspected wound at the dorsal-medial forefoot with suspicion for underlying tract extending into the deep soft tissues with small abscess.  3.Mild edema signal at the remaining base of the second proximal phalanx, which may be reactive. No definite confluent T1 signal abnormality is seen at this time to definitively indicate osteomyelitis.        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

## 2023-07-26 NOTE — PROGRESS NOTES
INFECTIOUS DISEASE Progress Note    Jermaine Singh  1945  [id number]      Admission Date: 7/23/2023      Requesting Provider: No ref. provider found  Evaluating Physician: Lincoln Padilla MD    Reason for Consultation: Left diabetic foot infection    History of present illness:    7/24/2023: Patient is a 78 y.o. male History of type 2 diabetes mellitus, peripheral neuropathy, Stage III chronic kidney disease, bilateral DVTs on Eliquis therapy, Paroxysmal atrial fibrillation, sick sinus syndrome status post pacemaker placement, CHF, CAD status post CABG in 2019 and peripheral vascular disease who is seen today for evaluation of left foot infection after undergoing prior left hallux and second toe amputations. He underwent left hallux Amputation on 8/2/22 and then underwent a left second toe amputation on 1/17/23. Cultures from 1/16/23 and 1/17/23 grew coagulase-negative staph. He has noted a worsening left forefoot infection over 2 days prior to admission and then presented to the emergency room yesterday. Laboratory studies revealed a white blood cell count of 10.9, C-reactive protein of 3.6, creatinine of 1.65, troponin of 104, And BNP of 33,171. He has remained afebrile since admission. He has a history of vancomycin allergy consisting of renal failure.  He was started on intravenous ceftriaxone along with Zyvox. He indicates that his left foot erythema has dramatically improved today.    7/25/2023: He has noted decreased left forefoot erythema and swelling.  He has remained afebrile.  MRI scan of the left foot is pending.  His white blood cell count today is 6.3.  His creatinine is 1.6.  He denies nausea and vomiting.  His blood cultures remain negative.    7/26/23: He has remained afebrile. Blood cultures are no growth so far.  Left foot MRI scan revealed a possible small abscess over the dorsal medial forefoot.  There was no definitive evidence of osteomyelitis.He would like to go home today.  He  denies nausea, vomiting, diarrhea.        Past Medical History:   Diagnosis Date    Allergic     Arthritis     Asthma     Coronary artery disease     Diabetes mellitus 2000    started on inuslin 12/2018; started on po meds in 2000; checking blood sugars daily     Disease of thyroid gland     po meds daily for hypothyroidism     Elevated serum creatinine 11/15/2022    Elevated troponin 10/02/2022    Generalized weakness 11/15/2022    History of fracture as a child     rt leg- severe     Hyperlipidemia     Hypertension     Hypothyroidism     PAF (paroxysmal atrial fibrillation) 07/23/2023    Peripheral neuropathy     Peripheral vascular disease     s/p angiogram 2/19-needs stent in left leg     Pleural effusion, bilateral 11/15/2022    Proximal phalanx fracture of the second digit extending into the second metatarsal joint 07/28/2022    RBBB     Right knee pain     Unstable angina 02/12/2019    Added automatically from request for surgery [id number]    Vitamin D deficiency        Past Surgical History:   Procedure Laterality Date    AMPUTATION DIGIT Left 01/17/2023    Procedure: AMPUTATION DIGIT LEFT;  Surgeon: Gopal Márquez MD;  Location:  [facility];  Service: Vascular;  Laterality: Left;    APPENDECTOMY      CARDIAC CATHETERIZATION N/A 02/14/2019    Procedure: Left Heart Cath;  Surgeon: Cooper Apodaca MD;  Location:  [facility] CATH INVASIVE LOCATION;  Service: Cardiology    CARDIAC ELECTROPHYSIOLOGY PROCEDURE N/A 05/18/2022    Procedure: DEVICE IMPLANT;  Surgeon: Cooper Apodaca MD;  Location:  [facility] CATH INVASIVE LOCATION;  Service: Cardiology;  Laterality: N/A;    CARDIAC SURGERY      COLONOSCOPY      CORONARY ARTERY BYPASS GRAFT N/A 03/22/2019    Procedure: MEDIAN STERNOTOMY, CORONARY ARTERY BYPASS GRAFT X3, UTILIZING THE LEFT INTERNAL MAMMARY ARTERY, EVH AND OPEN HARVEST OF THE RIGHT GREATER SAPHENOUS VEIN, EXPLORATION OF THE LEFT LEG;  Surgeon: Ap Au MD;  Location:  [facility] OR;  Service:  Cardiothoracic    EYE SURGERY Bilateral     cataracts     FRACTURE SURGERY      INTERVENTIONAL RADIOLOGY PROCEDURE N/A 07/29/2021    Procedure: Abdominal Aortagram with Runoff;  Surgeon: Jermaine Hernandez MD;  Location:  [facility]ATH INVASIVE LOCATION;  Service: Cardiovascular;  Laterality: N/A;    KNEE ARTHROSCOPY      right x 2, left x 1    LACERATION REPAIR      right leg    LEG SURGERY      2 for fracture of rt leg     TONSILLECTOMY      Adnoidectomy    TRANS METATARSAL AMPUTATION Left 08/02/2022    Procedure: GREAT TOE AMPUTATION LEFT;  Surgeon: Gopal Márquez MD;  Location:  [facility];  Service: Vascular;  Laterality: Left;       Family History   Problem Relation Age of Onset    Coronary artery disease Mother     Diabetes Mother     Hyperlipidemia Mother     Cancer Father        Social History     Socioeconomic History    Marital status:     Number of children: 2   Tobacco Use    Smoking status: Never     Passive exposure: Past    Smokeless tobacco: Never   Vaping Use    Vaping Use: Never used   Substance and Sexual Activity    Alcohol use: Not Currently     Comment: every 2-3 months    Drug use: No    Sexual activity: Defer       Allergies   Allergen Reactions    Vancomycin Other (See Comments)     Kidney failure per last admission         Medication:    Current Facility-Administered Medications:     acetaminophen (TYLENOL) tablet 650 mg, 650 mg, Oral, Q4H PRN, Fariba Olmos MD, 650 mg at 07/25/23 0856    aspirin chewable tablet 81 mg, 81 mg, Oral, Daily, Fariba Olmos MD, 81 mg at 07/26/23 0804    atorvastatin (LIPITOR) tablet 20 mg, 20 mg, Oral, Nightly, Fariba Olmos MD, 20 mg at 07/25/23 2201    sennosides-docusate (PERICOLACE) 8.6-50 MG per tablet 2 tablet, 2 tablet, Oral, BID, 2 tablet at 07/26/23 0805 **AND** polyethylene glycol (MIRALAX) packet 17 g, 17 g, Oral, Daily PRN, 17 g at 07/24/23 0829 **AND** bisacodyl (DULCOLAX) EC tablet 5 mg, 5 mg, Oral, Daily PRN **AND** bisacodyl (DULCOLAX)  suppository 10 mg, 10 mg, Rectal, Daily PRN, Fariba Olmos MD    bumetanide (BUMEX) tablet 1 mg, 1 mg, Oral, BID, Fariba Olmos MD, 1 mg at 07/26/23 0804    calcium carbonate (TUMS) chewable tablet 500 mg (200 mg elemental), 2 tablet, Oral, BID PRN, Fariba Olmos MD    carvedilol (COREG) tablet 3.125 mg, 3.125 mg, Oral, BID With Meals, Fariba Olmos MD, 3.125 mg at 07/26/23 0804    cefTRIAXone (ROCEPHIN) 2000 mg/100 mL 0.9% NS IVPB (MBP), 2,000 mg, Intravenous, Q24H, Lincoln Padilla MD    dextrose (D50W) (25 g/50 mL) IV injection 25 g, 25 g, Intravenous, Q15 Min PRN, Fariba Olmos MD    dextrose (GLUTOSE) oral gel 15 g, 15 g, Oral, Q15 Min PRN, Fariba Olmos MD    Enoxaparin Sodium (LOVENOX) syringe 60 mg, 0.7 mg/kg, Subcutaneous, Q12H, Jermaine Ceballos IV, PharmD, 60 mg at 07/26/23 0804    famotidine (PEPCID) tablet 20 mg, 20 mg, Oral, Daily, Fariba Olmos MD, 20 mg at 07/26/23 0804    glucagon (GLUCAGEN) injection 1 mg, 1 mg, Intramuscular, Q15 Min PRN, Fariba Olmos MD    HYDROcodone-acetaminophen (NORCO) 5-325 MG per tablet 1 tablet, 1 tablet, Oral, Q4H PRN, Fariba Olmos MD    insulin detemir (LEVEMIR) injection 5 Units, 5 Units, Subcutaneous, Q12H, Angelica Blackwood MD    Insulin Lispro (humaLOG) injection 2-9 Units, 2-9 Units, Subcutaneous, 4x Daily AC & at Bedtime, Fariba Olmos MD, 2 Units at 07/25/23 2207    levothyroxine (SYNTHROID, LEVOTHROID) tablet 88 mcg, 88 mcg, Oral, Q AM, Fariba Olmos MD, 88 mcg at 07/26/23 0547    lidocaine (LIDODERM) 5 % 1 patch, 1 patch, Transdermal, Q24H, Angelica Blackwood MD, 1 patch at 07/26/23 0805    linezolid (ZYVOX) tablet 600 mg, 600 mg, Oral, Q12H, Lincoln Padilla MD, 600 mg at 07/26/23 0804    losartan (COZAAR) tablet 100 mg, 100 mg, Oral, Daily, Fariba Olmos MD, 100 mg at 07/25/23 0821    magnesium oxide (MAG-OX) tablet 400 mg, 400 mg, Oral, Daily, Fariba Olmos MD, 400 mg at 07/26/23 0804    morphine injection 1 mg, 1 mg, Intravenous, Q4H PRN **AND** naloxone (NARCAN)  injection 0.4 mg, 0.4 mg, Intravenous, Q5 Min PRN, Fariba Olmos MD    nitroglycerin (NITROSTAT) SL tablet 0.4 mg, 0.4 mg, Sublingual, Q5 Min PRN, Fariba Olmos MD    nystatin (MYCOSTATIN) powder, , Topical, BID, Fariba Olmos MD, Given at 23 0806    ondansetron (ZOFRAN) tablet 4 mg, 4 mg, Oral, Q6H PRN **OR** ondansetron (ZOFRAN) injection 4 mg, 4 mg, Intravenous, Q6H PRN, Fariba Olmos MD    QUEtiapine (SEROquel) tablet 12.5 mg, 12.5 mg, Oral, Nightly PRN, Fariba Olmos MD    sodium chloride 0.9 % flush 10 mL, 10 mL, Intravenous, PRN, Jayden Bryant MD    sodium chloride 0.9 % flush 10 mL, 10 mL, Intravenous, Q12H, Fariba Olmos MD, 10 mL at 23 0809    sodium chloride 0.9 % flush 10 mL, 10 mL, Intravenous, PRN, Fariba Olmos MD    sodium chloride 0.9 % infusion 40 mL, 40 mL, Intravenous, PRN, Fariba Olmos MD    tamsulosin (FLOMAX) 24 hr capsule 0.4 mg, 0.4 mg, Oral, Daily, Fariba Olmos MD, 0.4 mg at 23 0804    Antibiotics:  Anti-Infectives (From admission, onward)      Ordered     Dose/Rate Route Frequency Start Stop    23 1818  cefTRIAXone (ROCEPHIN) 2000 mg/100 mL 0.9% NS IVPB (MBP)        Ordering Provider: Lincoln Padilla MD    2,000 mg  over 30 Minutes Intravenous Every 24 Hours 23 1200 23 1159    23 1819  linezolid (ZYVOX) tablet 600 mg        Ordering Provider: Lincoln Padilla MD    600 mg Oral Every 12 Hours Scheduled 23 2100 23 191  cefTRIAXone (ROCEPHIN) 2000 mg/100 mL 0.9% NS IVPB (MBP)        Ordering Provider: Jayden Bryant MD    2,000 mg  over 30 Minutes Intravenous Once 23 1928 23 2242    23 191  Linezolid (ZYVOX) 600 mg 300 mL        Ordering Provider: Jayden Bryant MD    600 mg  300 mL/hr over 60 Minutes Intravenous Once 23 19223 2301              Review of Systems:  See HPI      Physical Exam:   Vital Signs  Temp (24hrs), Av.9 °F (36.6 °C), Min:97.4 °F (36.3 °C),  Max:98.1 °F (36.7 °C)    Temp  Min: 97.4 °F (36.3 °C)  Max: 98.1 °F (36.7 °C)  BP  Min: 94/60  Max: 117/67  Pulse  Min: 59  Max: 74  Resp  Min: 16  Max: 20  SpO2  Min: 95 %  Max: 99 %    GENERAL: Awake and alert, in no acute distress.   HEENT: Normocephalic, atraumatic.  PERRL. EOMI. No conjunctival injection. No icterus. Oropharynx clear without evidence of thrush or exudate.   NECK: Supple   HEART: RRR; No murmur, rubs, gallops.   LUNGS: Clear to auscultation bilaterally without wheezing, rales, rhonchi. Normal respiratory effort. Nonlabored. .  ABDOMEN: Soft, nontender, nondistended.  No rebound or guarding. NO mass or HSM.  EXT: He has decreased erythema over the left first metatarsal region with a ruptured bullous lesion that is approximately 3 cm in diameter.There is a small 0.5 cm wound which is draining serosanguineous material.  I cleansed this area with alcohol and then probed the wound.  This extends down to the pocket that is approximately 1.75 cm deep.   :  Without Fonseca catheter.  MSK: No joint effusions or erythema  SKIN: No diffuse rash present  NEURO: Alert and in no acute distress.  PSYCHIATRIC: Normal insight and judgment. Cooperative with PE    Laboratory Data    Results from last 7 days   Lab Units 07/25/23  0525 07/23/23  1746   WBC 10*3/mm3 6.32 10.88*   HEMOGLOBIN g/dL 11.2* 12.2*   HEMATOCRIT % 34.4* 39.0   PLATELETS 10*3/mm3 166 179       Results from last 7 days   Lab Units 07/25/23  0525   SODIUM mmol/L 139   POTASSIUM mmol/L 4.4   CHLORIDE mmol/L 104   CO2 mmol/L 23.0   BUN mg/dL 47*   CREATININE mg/dL 1.61*   GLUCOSE mg/dL 165*   CALCIUM mg/dL 8.4*       Results from last 7 days   Lab Units 07/23/23  1823   ALK PHOS U/L 114   BILIRUBIN mg/dL 0.5   ALT (SGPT) U/L 28   AST (SGOT) U/L 25       Results from last 7 days   Lab Units 07/23/23  1746   SED RATE mm/hr 30*       Results from last 7 days   Lab Units 07/23/23  1823   CRP mg/dL 3.60*       Results from last 7 days   Lab Units  07/23/23  1746   LACTATE mmol/L 1.6               Estimated Creatinine Clearance: 43.5 mL/min (A) (by C-G formula based on SCr of 1.61 mg/dL (H)).      Microbiology:  No results found for: ACANTHNAEG, AFBCX, BPERTUSSISCX, BLOODCX  No results found for: BCIDPCR, CXREFLEX, CSFCX, CULTURETIS  No results found for: CULTURES, HSVCX, URCX  No results found for: EYECULTURE, GCCX, HSVCULTURE, LABHSV  No results found for: LEGIONELLA, MRSACX, MUMPSCX, MYCOPLASCX  No results found for: NOCARDIACX, STOOLCX  No results found for: THROATCX, UNSTIMCULT, URINECX, CULTURE, VZVCULTUR  No results found for: VIRALCULTU, WOUNDCX        Radiology:  Imaging Results (Last 72 Hours)       Procedure Component Value Units Date/Time    MRI Foot Left Without Contrast [[id number]] Collected: 07/25/23 1753     Updated: 07/25/23 1834    Narrative:        MRI FOOT LEFT WO CONTRAST    Date of Exam: 7/25/2023 5:14 PM EDT    Indication: Foot swelling, diabetic, osteomyelitis suspected, xray done.     Comparison: Radiographs July 23, 2023, January 16, 2023, July 27, 2022    Technique:  Routine multiplanar/multisequence sequence images of the left foot were obtained without contrast administration.      Findings:  There has been amputation of the first digit at the proximal metatarsal. There has been amputation of the second digit at the base of the proximal phalanx.    There is suspicion for a wound at the dorsal-medial aspect of the forefoot, distal to the first digit amputation site. There is T1 hypointense signal in the soft tissues in this location with heterogeneous T2 hyperintense signal. Evaluation for abscess   is limited by lack of intravenous contrast, but there is suspicion for a subtle tract extending into the deep soft tissues with small focal fluid signal collection measuring approximately 17 x 7 x 11 mm. These findings can be seen on short axis series 7   image 13 and on long axis T2 images 12-14 of series 10.    There are foci of  susceptibility artifact in the soft tissues in the area of suspected wound suspicious for a small amount of soft tissue gas. There is also foci of susceptibility artifact at the amputation sites suggesting postsurgical changes.    There is additional diffuse forefoot soft tissue edema. No other definite well-defined fluid signal collection is seen on this noncontrast study. There is mild diffuse muscle edema signal and mild to moderate muscle atrophy which could be seen with   denervation and possibly myositis.    There appears to be mild edema signal at the remaining base of the second proximal phalanx. Probable subchondral cyst formation is seen at the proximal aspect of the medial cuneiform, presumed to be related to arthropathy. No other significant marrow   edema signal is identified. There does not appear to be confluent T1 signal abnormality in the osseous structures of the foot at this time to indicate osteomyelitis. No definite acute fracture is seen. There are hammertoe deformities.         Impression:      Impression:  1.Status post amputation of the first digit at the proximal metatarsal and second digit at the base of the proximal phalanx.  2.Suspected wound at the dorsal-medial forefoot with suspicion for underlying tract extending into the deep soft tissues with small abscess.  3.Mild edema signal at the remaining base of the second proximal phalanx, which may be reactive. No definite confluent T1 signal abnormality is seen at this time to definitively indicate osteomyelitis.        Electronically Signed: Eyad Wilburn    7/25/2023 6:31 PM EDT    Workstation ID: [id number]    XR Foot 3+ View Left [[id number]] Collected: 07/23/23 1719     Updated: 07/23/23 1725    Narrative:      XR FOOT 3+ VW LEFT    Date of Exam: 7/23/2023 5:03 PM EDT    Indication: Left foot swelling, redness, and blistering, previous partial forefoot amputation.    Comparison: Left foot x-ray performed on 7/27/2022 and a left toe x-ray  from 1/16/2023.    Findings:  The patient is status post amputation of the left great toe and the distal first metatarsal bone. The patient's had interval amputation of most of the left second toe. The base of the proximal phalanx remains. There is no cortical destruction or   suspicious periostitis to indicate radiographic changes of osteomyelitis. There is mature periosteal reaction along the shafts of the second through fourth metatarsal bones which can be seen with venous stasis. There is mild spurring within the midfoot   consistent with arthritis. There is a prominent calcaneal enthesophyte. There are scattered vascular calcifications.        Impression:      Impression:    1. Status post amputation procedures of the left foot as described.  2. No radiographic changes of osteomyelitis.  3. Additional incidental findings as noted above.      Electronically Signed: Pan Sharp    7/23/2023 5:22 PM EDT    Workstation ID: [id number]    XR Chest 1 View [[id number]] Collected: 07/23/23 1653     Updated: 07/23/23 1657    Narrative:      XR CHEST 1 VW    Date of Exam: 7/23/2023 4:47 PM EDT    Indication: Chest Pain Triage Protocol    Comparison: 1/16/2023    Findings:  Patient is status post median sternotomy and CABG. Left chest wall pacemaker is present. Cardiac silhouette is enlarged. No consolidation or mass is seen. No pleural effusion or pneumothorax is seen. No acute osseous lesion is seen.      Impression:      Impression:  1.Cardiomegaly. No acute radiographic abnormality is identified.      Electronically Signed: Lucio Lanier    7/23/2023 4:54 PM EDT    Workstation ID: [id number]        I read his left foot MRI scan with Dr. Wang in radiology.      Impression:   Left forefoot diabetic wound infection/cellulitis-the appearance and rapidity of onset suggests a beta streptococcal infection. He has continued to improve but he does have a deep infection without overt evidence of osteomyelitis on MRI scan.  He  will be at risk for persistent infection/relapse and may ultimately require surgical debridement to resolve this process.  I will obtain a deep wound culture today and plan to discharge him after he receives intravenous ceftriaxone.  He will require a fairly prolonged course of intravenous antibiotic therapy followed by prolonged oral antibiotic therapy.  Leukocytosisneutrophilia-secondary to the left foot infection  Diabetic peripheral vascular disease  Type 2 diabetes mellitus  Diabetic peripheral neuropathy  Stage IIIb chronic kidney disease-We will need to try to avoid potentially nephrotoxic antibiotic therapy due to his chronic kidney disease  Chronic diastolic CHF  Paroxysmal atrial fibrillation  Sick sinus syndrome-status post pacemaker placement  Coronary artery disease-status post CABG on 3/22/19    PLAN/RECOMMENDATIONS:    Discontinue Zyvox  Continue intravenous ceftriaxone-he will require a dose prior to discharge today  Left foot wound culture-I probed his wound and sent a deep culture today  Acute outpatient care in our office starting tomorrow-I will plan to see him in the office tomorrow and reassess his culture data.  He will also receive outpatient intravenous antibiotic therapy in our office starting tomorrow.    I reviewed his left foot MRI scan with Dr. Wang in radiology today.  I discussed his complex situation with Dr. Blackwood in detail today.  I coordinated his care today.  His high complexity medical problems required high complexity medical decision making today.  I spent over 40 minutes on his care today.      Lincoln Padilla MD  7/26/2023  08:43 EDT

## 2023-07-26 NOTE — PLAN OF CARE
Problem: Asthma Comorbidity  Goal: Maintenance of Asthma Control  Outcome: Ongoing, Progressing  Intervention: Maintain Asthma Symptom Control  Recent Flowsheet Documentation  Taken 7/26/2023 0400 by Guerline Londono RN  Medication Review/Management: medications reviewed  Taken 7/26/2023 0050 by Guerline Londono RN  Medication Review/Management: medications reviewed  Taken 7/25/2023 2000 by Guerline Londono RN  Medication Review/Management: medications reviewed     Problem: Behavioral Health Comorbidity  Goal: Maintenance of Behavioral Health Symptom Control  Outcome: Ongoing, Progressing  Intervention: Maintain Behavioral Health Symptom Control  Recent Flowsheet Documentation  Taken 7/26/2023 0400 by Guerline Londono RN  Medication Review/Management: medications reviewed  Taken 7/26/2023 0050 by Guerline Londono RN  Medication Review/Management: medications reviewed  Taken 7/25/2023 2000 by Guerline Londono RN  Medication Review/Management: medications reviewed     Problem: COPD (Chronic Obstructive Pulmonary Disease) Comorbidity  Goal: Maintenance of COPD Symptom Control  Outcome: Ongoing, Progressing  Intervention: Maintain COPD-Symptom Control  Recent Flowsheet Documentation  Taken 7/26/2023 0400 by Guerline Londono RN  Medication Review/Management: medications reviewed  Taken 7/26/2023 0050 by Guerline Londono RN  Medication Review/Management: medications reviewed  Taken 7/25/2023 2000 by Guerline Londono RN  Medication Review/Management: medications reviewed     Problem: Diabetes Comorbidity  Goal: Blood Glucose Level Within Targeted Range  Outcome: Ongoing, Progressing  Intervention: Monitor and Manage Glycemia  Recent Flowsheet Documentation  Taken 7/25/2023 2000 by Guerline Londono RN  Glycemic Management:   blood glucose monitored   supplemental insulin given     Problem: Heart Failure Comorbidity  Goal: Maintenance of Heart Failure Symptom Control  Outcome: Ongoing,  Progressing  Intervention: Maintain Heart Failure-Management  Recent Flowsheet Documentation  Taken 7/26/2023 0400 by Guerline Londono RN  Medication Review/Management: medications reviewed  Taken 7/26/2023 0050 by Guerline Londono RN  Medication Review/Management: medications reviewed  Taken 7/25/2023 2000 by Guerline Londono RN  Medication Review/Management: medications reviewed     Problem: Hypertension Comorbidity  Goal: Blood Pressure in Desired Range  Outcome: Ongoing, Progressing  Intervention: Maintain Blood Pressure Management  Recent Flowsheet Documentation  Taken 7/26/2023 0400 by Guerline Londono RN  Medication Review/Management: medications reviewed  Taken 7/26/2023 0050 by Guerline Londono RN  Medication Review/Management: medications reviewed  Taken 7/25/2023 2000 by Guerline Londono RN  Medication Review/Management: medications reviewed     Problem: Obstructive Sleep Apnea Risk or Actual Comorbidity Management  Goal: Unobstructed Breathing During Sleep  Outcome: Ongoing, Progressing     Problem: Osteoarthritis Comorbidity  Goal: Maintenance of Osteoarthritis Symptom Control  Outcome: Ongoing, Progressing  Intervention: Maintain Osteoarthritis Symptom Control  Recent Flowsheet Documentation  Taken 7/26/2023 0400 by Guerline Londono RN  Medication Review/Management: medications reviewed  Taken 7/26/2023 0050 by Guerline Londono RN  Medication Review/Management: medications reviewed  Taken 7/25/2023 2000 by Guerline Londono RN  Medication Review/Management: medications reviewed     Problem: Pain Chronic (Persistent) (Comorbidity Management)  Goal: Acceptable Pain Control and Functional Ability  Outcome: Ongoing, Progressing  Intervention: Manage Persistent Pain  Recent Flowsheet Documentation  Taken 7/26/2023 0400 by Guerline Londono RN  Medication Review/Management: medications reviewed  Taken 7/26/2023 0050 by Guerline Londono RN  Medication Review/Management:  medications reviewed  Taken 7/25/2023 2000 by Guerline Londono RN  Medication Review/Management: medications reviewed  Intervention: Optimize Psychosocial Wellbeing  Recent Flowsheet Documentation  Taken 7/25/2023 2000 by Guerline Londono RN  Diversional Activities: television  Family/Support System Care:   self-care encouraged   support provided     Problem: Seizure Disorder Comorbidity  Goal: Maintenance of Seizure Control  Outcome: Ongoing, Progressing     Problem: Skin Injury Risk Increased  Goal: Skin Health and Integrity  Outcome: Ongoing, Progressing  Intervention: Optimize Skin Protection  Recent Flowsheet Documentation  Taken 7/26/2023 0400 by Guerline Londono RN  Pressure Reduction Techniques:   frequent weight shift encouraged   heels elevated off bed  Head of Bed (HOB) Positioning: HOB at 20-30 degrees  Pressure Reduction Devices:   pressure-redistributing mattress utilized   positioning supports utilized  Skin Protection:   adhesive use limited   incontinence pads utilized   drying agents applied   skin sealant/moisture barrier applied   transparent dressing maintained  Taken 7/26/2023 0050 by Guerline Londono RN  Pressure Reduction Techniques:   frequent weight shift encouraged   heels elevated off bed  Head of Bed (HOB) Positioning: HOB at 20-30 degrees  Pressure Reduction Devices:   pressure-redistributing mattress utilized   positioning supports utilized  Skin Protection:   adhesive use limited   incontinence pads utilized   drying agents applied   skin sealant/moisture barrier applied   transparent dressing maintained  Taken 7/25/2023 2000 by Guerline Londono RN  Pressure Reduction Techniques:   frequent weight shift encouraged   heels elevated off bed  Head of Bed (HOB) Positioning: HOB at 30-45 degrees  Pressure Reduction Devices:   pressure-redistributing mattress utilized   positioning supports utilized  Skin Protection:   adhesive use limited   drying agents applied    incontinence pads utilized   skin sealant/moisture barrier applied   transparent dressing maintained     Problem: Pain Acute  Goal: Acceptable Pain Control and Functional Ability  Outcome: Ongoing, Progressing  Intervention: Prevent or Manage Pain  Recent Flowsheet Documentation  Taken 7/26/2023 0400 by Guerline Londono RN  Medication Review/Management: medications reviewed  Taken 7/26/2023 0050 by Guerline Londono RN  Medication Review/Management: medications reviewed  Taken 7/25/2023 2000 by Guerline Londono RN  Medication Review/Management: medications reviewed  Intervention: Optimize Psychosocial Wellbeing  Recent Flowsheet Documentation  Taken 7/25/2023 2000 by Guerline Londono RN  Diversional Activities: television     Problem: Adult Inpatient Plan of Care  Goal: Plan of Care Review  Outcome: Ongoing, Progressing  Goal: Patient-Specific Goal (Individualized)  Outcome: Ongoing, Progressing  Goal: Absence of Hospital-Acquired Illness or Injury  Outcome: Ongoing, Progressing  Intervention: Identify and Manage Fall Risk  Recent Flowsheet Documentation  Taken 7/26/2023 0400 by Guerline Londono RN  Safety Promotion/Fall Prevention:   activity supervised   assistive device/personal items within reach   clutter free environment maintained   fall prevention program maintained   nonskid shoes/slippers when out of bed   room organization consistent   safety round/check completed  Taken 7/26/2023 0050 by Guerline Londono RN  Safety Promotion/Fall Prevention:   activity supervised   assistive device/personal items within reach   clutter free environment maintained   fall prevention program maintained   nonskid shoes/slippers when out of bed   room organization consistent   safety round/check completed  Taken 7/25/2023 2000 by Guerline Londono RN  Safety Promotion/Fall Prevention:   activity supervised   assistive device/personal items within reach   clutter free environment maintained   fall prevention  program maintained   nonskid shoes/slippers when out of bed   safety round/check completed   room organization consistent  Intervention: Prevent Skin Injury  Recent Flowsheet Documentation  Taken 7/26/2023 0400 by Guerline Londono RN  Body Position:   position changed independently   neutral head position   neutral body alignment  Skin Protection:   adhesive use limited   incontinence pads utilized   drying agents applied   skin sealant/moisture barrier applied   transparent dressing maintained  Taken 7/26/2023 0050 by Guerline Londono RN  Body Position:   position changed independently   side-lying   right  Skin Protection:   adhesive use limited   incontinence pads utilized   drying agents applied   skin sealant/moisture barrier applied   transparent dressing maintained  Taken 7/25/2023 2000 by Guerline Londono RN  Body Position:   position changed independently   neutral head position   neutral body alignment   legs elevated  Skin Protection:   adhesive use limited   drying agents applied   incontinence pads utilized   skin sealant/moisture barrier applied   transparent dressing maintained  Intervention: Prevent and Manage VTE (Venous Thromboembolism) Risk  Recent Flowsheet Documentation  Taken 7/26/2023 0400 by Guerline Londono RN  VTE Prevention/Management: (lovenox subq)   bilateral   sequential compression devices off   patient refused intervention   other (see comments)  Taken 7/26/2023 0050 by Guerline Londono RN  VTE Prevention/Management:   bilateral   sequential compression devices off   patient refused intervention  Taken 7/25/2023 2000 by Guerline Londono RN  VTE Prevention/Management: (lovenox subq)   bilateral   sequential compression devices off   patient refused intervention   other (see comments)  Intervention: Prevent Infection  Recent Flowsheet Documentation  Taken 7/26/2023 0400 by Guerline Londono RN  Infection Prevention:   hand hygiene promoted   rest/sleep promoted    single patient room provided  Taken 7/26/2023 0050 by Guerline Londono, RN  Infection Prevention:   hand hygiene promoted   rest/sleep promoted   single patient room provided  Taken 7/25/2023 2000 by Guerline Londono, RN  Infection Prevention:   hand hygiene promoted   rest/sleep promoted   single patient room provided  Goal: Optimal Comfort and Wellbeing  Outcome: Ongoing, Progressing  Intervention: Provide Person-Centered Care  Recent Flowsheet Documentation  Taken 7/25/2023 2000 by Guerline Londono, RN  Trust Relationship/Rapport:   care explained   choices provided   questions answered   reassurance provided   thoughts/feelings acknowledged  Goal: Readiness for Transition of Care  Outcome: Ongoing, Progressing   Goal Outcome Evaluation:

## 2023-07-26 NOTE — CASE MANAGEMENT/SOCIAL WORK
Continued Stay Note  [facility]     Patient Name: Jermaine Singh  MRN: [id number]  Today's Date: 7/26/2023    Admit Date: 7/23/2023    Plan: Home   Discharge Plan       Row Name 07/26/23 0277       Plan    Plan Home    Plan Comments Patient has follow up with [facility] tomorrow for infusion. I spoke with Luz Elena in the [place] area of [facility] to let her know of dressing changes. New order sent to [facility] per patient's preference. He has a rolling waliker.    Final Discharge Disposition Code 01 - home or self-care                   Discharge Codes    No documentation.                 Expected Discharge Date and Time       Expected Discharge Date Expected Discharge Time    Jul 26, 2023               Jenna Coe RN

## 2023-07-26 NOTE — OUTREACH NOTE
Prep Survey      Flowsheet Row Responses   [facility] patient discharged from? [place]   Is LACE score < 7 ? No   Eligibility [facility]   Date of Admission 07/23/23   Date of Discharge 07/26/23   Discharge Disposition Home or Self Care   Discharge diagnosis Diabetic foot infection, Cellulitis of L Foot   Does the patient have one of the following disease processes/diagnoses(primary or secondary)? Other   Does the patient have Home health ordered? No   Is there a DME ordered? No   Medication alerts for this patient IV ABX- Ceftriaxone   General alerts for this patient Outpatient Physical Therapy   Prep survey completed? Yes            Karen JIMENES - Registered Nurse

## 2023-07-27 ENCOUNTER — TRANSITIONAL CARE MANAGEMENT TELEPHONE ENCOUNTER (OUTPATIENT)
Dept: CALL CENTER | Facility: HOSPITAL | Age: 78
End: 2023-07-27
Payer: MEDICARE

## 2023-07-27 NOTE — OUTREACH NOTE
Call Center TCM Note      Flowsheet Row Responses   [facility] patient discharged from? [place]   Does the patient have one of the following disease processes/diagnoses(primary or secondary)? Other   TCM attempt successful? No  [No verbal release on file]   Unsuccessful attempts Attempt 1            Holli Preston RN    7/27/2023, 10:13 EDT

## 2023-07-27 NOTE — OUTREACH NOTE
Call Center TCM Note      Flowsheet Row Responses   [facility] patient discharged from? [place]   Does the patient have one of the following disease processes/diagnoses(primary or secondary)? Other   TCM attempt successful? No   Unsuccessful attempts Attempt 2   Call Status Left message            Holli Preston RN    7/27/2023, 15:31 EDT

## 2023-07-28 ENCOUNTER — LAB (OUTPATIENT)
Dept: LAB | Facility: HOSPITAL | Age: 78
End: 2023-07-28
Payer: MEDICARE

## 2023-07-28 ENCOUNTER — TRANSCRIBE ORDERS (OUTPATIENT)
Dept: LAB | Facility: HOSPITAL | Age: 78
End: 2023-07-28
Payer: MEDICARE

## 2023-07-28 ENCOUNTER — TRANSITIONAL CARE MANAGEMENT TELEPHONE ENCOUNTER (OUTPATIENT)
Dept: CALL CENTER | Facility: HOSPITAL | Age: 78
End: 2023-07-28
Payer: MEDICARE

## 2023-07-28 DIAGNOSIS — L97.522 ULCER OF LEFT FOOT, WITH FAT LAYER EXPOSED: ICD-10-CM

## 2023-07-28 DIAGNOSIS — D72.823 NEUTROPHILIC LEUKEMOID REACTION: ICD-10-CM

## 2023-07-28 DIAGNOSIS — L03.116 CELLULITIS OF LEFT FOOT: ICD-10-CM

## 2023-07-28 DIAGNOSIS — L13.8: ICD-10-CM

## 2023-07-28 DIAGNOSIS — L13.8: Primary | ICD-10-CM

## 2023-07-28 LAB
ALBUMIN SERPL-MCNC: 3.8 G/DL (ref 3.5–5.2)
ALBUMIN/GLOB SERPL: 1.3 G/DL
ALP SERPL-CCNC: 100 U/L (ref 39–117)
ALT SERPL W P-5'-P-CCNC: 32 U/L (ref 1–41)
ANION GAP SERPL CALCULATED.3IONS-SCNC: 15.6 MMOL/L (ref 5–15)
AST SERPL-CCNC: 27 U/L (ref 1–40)
BACTERIA SPEC AEROBE CULT: ABNORMAL
BACTERIA SPEC AEROBE CULT: NORMAL
BACTERIA SPEC AEROBE CULT: NORMAL
BILIRUB SERPL-MCNC: 0.3 MG/DL (ref 0–1.2)
BUN SERPL-MCNC: 46 MG/DL (ref 8–23)
BUN/CREAT SERPL: 25.6 (ref 7–25)
CALCIUM SPEC-SCNC: 8.8 MG/DL (ref 8.6–10.5)
CHLORIDE SERPL-SCNC: 102 MMOL/L (ref 98–107)
CK SERPL-CCNC: 133 U/L (ref 20–200)
CO2 SERPL-SCNC: 19.4 MMOL/L (ref 22–29)
CREAT SERPL-MCNC: 1.8 MG/DL (ref 0.76–1.27)
CRP SERPL-MCNC: 0.84 MG/DL (ref 0–0.5)
EGFRCR SERPLBLD CKD-EPI 2021: 38.1 ML/MIN/1.73
GLOBULIN UR ELPH-MCNC: 2.9 GM/DL
GLUCOSE SERPL-MCNC: 297 MG/DL (ref 65–99)
GRAM STN SPEC: ABNORMAL
GRAM STN SPEC: ABNORMAL
POTASSIUM SERPL-SCNC: 5 MMOL/L (ref 3.5–5.2)
PROT SERPL-MCNC: 6.7 G/DL (ref 6–8.5)
SODIUM SERPL-SCNC: 137 MMOL/L (ref 136–145)

## 2023-07-28 PROCEDURE — 85025 COMPLETE CBC W/AUTO DIFF WBC: CPT

## 2023-07-28 PROCEDURE — 82550 ASSAY OF CK (CPK): CPT | Performed by: INTERNAL MEDICINE

## 2023-07-28 PROCEDURE — 85652 RBC SED RATE AUTOMATED: CPT

## 2023-07-28 PROCEDURE — 36415 COLL VENOUS BLD VENIPUNCTURE: CPT | Performed by: INTERNAL MEDICINE

## 2023-07-28 PROCEDURE — 80053 COMPREHEN METABOLIC PANEL: CPT

## 2023-07-28 PROCEDURE — 86140 C-REACTIVE PROTEIN: CPT | Performed by: INTERNAL MEDICINE

## 2023-07-28 NOTE — OUTREACH NOTE
Please check if he can come Friday the 4th at 1:15 PM?  I have blocked off a 30-minute slot if he is able to.

## 2023-07-28 NOTE — OUTREACH NOTE
Call Center TCM Note      Flowsheet Row Responses   [facility] patient discharged from? [place]   Does the patient have one of the following disease processes/diagnoses(primary or secondary)? Other   TCM attempt successful? Yes   Call start time 0819   Call end time 0824   Discharge diagnosis Diabetic foot infection, Cellulitis of L Foot   Meds reviewed with patient/caregiver? Yes   Is the patient having any side effects they believe may be caused by any medication additions or changes? No   Does the patient have all medications ordered at discharge? Yes   Is the patient taking all medications as directed (includes completed medication regime)? Yes   Comments No available HOSP DC FU appts that meet TCM guidelines.   Does the patient have an appointment with their PCP within 7-14 days of discharge? No appointments available   Nursing Interventions Routed TCM call to PCP office   Has home health visited the patient within 72 hours of discharge? N/A   Psychosocial issues? No   Did the patient receive a copy of their discharge instructions? Yes   Nursing interventions Reviewed instructions with patient   What is the patient's perception of their health status since discharge? Improving   Is the patient/caregiver able to teach back signs and symptoms related to disease process for when to call PCP? Yes   Is the patient/caregiver able to teach back signs and symptoms related to disease process for when to call 911? Yes   Is the patient/caregiver able to teach back the hierarchy of who to call/visit for symptoms/problems? PCP, Specialist, Home health nurse, Urgent Care, ED, 911 Yes   TCM call completed? Yes   Wrap up additional comments Pt reports He is doing better. Getting IV ABT.   Call end time 0824            Cassandra Olmos RN    7/28/2023, 08:25 EDT

## 2023-07-29 LAB
BASOPHILS # BLD AUTO: 0.04 10*3/MM3 (ref 0–0.2)
BASOPHILS NFR BLD AUTO: 0.5 % (ref 0–1.5)
DEPRECATED RDW RBC AUTO: 42.9 FL (ref 37–54)
EOSINOPHIL # BLD AUTO: 0.23 10*3/MM3 (ref 0–0.4)
EOSINOPHIL NFR BLD AUTO: 3 % (ref 0.3–6.2)
ERYTHROCYTE [DISTWIDTH] IN BLOOD BY AUTOMATED COUNT: 13.1 % (ref 12.3–15.4)
ERYTHROCYTE [SEDIMENTATION RATE] IN BLOOD: 14 MM/HR (ref 0–20)
HCT VFR BLD AUTO: 37.3 % (ref 37.5–51)
HGB BLD-MCNC: 11.9 G/DL (ref 13–17.7)
IMM GRANULOCYTES # BLD AUTO: 0.03 10*3/MM3 (ref 0–0.05)
IMM GRANULOCYTES NFR BLD AUTO: 0.4 % (ref 0–0.5)
LYMPHOCYTES # BLD AUTO: 1.21 10*3/MM3 (ref 0.7–3.1)
LYMPHOCYTES NFR BLD AUTO: 15.7 % (ref 19.6–45.3)
MCH RBC QN AUTO: 28.7 PG (ref 26.6–33)
MCHC RBC AUTO-ENTMCNC: 31.9 G/DL (ref 31.5–35.7)
MCV RBC AUTO: 89.9 FL (ref 79–97)
MONOCYTES # BLD AUTO: 0.6 10*3/MM3 (ref 0.1–0.9)
MONOCYTES NFR BLD AUTO: 7.8 % (ref 5–12)
NEUTROPHILS NFR BLD AUTO: 5.59 10*3/MM3 (ref 1.7–7)
NEUTROPHILS NFR BLD AUTO: 72.6 % (ref 42.7–76)
NRBC BLD AUTO-RTO: 0 /100 WBC (ref 0–0.2)
PLATELET # BLD AUTO: 205 10*3/MM3 (ref 140–450)
PMV BLD AUTO: 12.1 FL (ref 6–12)
RBC # BLD AUTO: 4.15 10*6/MM3 (ref 4.14–5.8)
WBC NRBC COR # BLD: 7.7 10*3/MM3 (ref 3.4–10.8)

## 2023-08-01 ENCOUNTER — TRANSCRIBE ORDERS (OUTPATIENT)
Dept: LAB | Facility: HOSPITAL | Age: 78
End: 2023-08-01
Payer: MEDICARE

## 2023-08-01 ENCOUNTER — LAB (OUTPATIENT)
Dept: LAB | Facility: HOSPITAL | Age: 78
End: 2023-08-01
Payer: MEDICARE

## 2023-08-01 DIAGNOSIS — L03.116 CELLULITIS OF LEFT FOOT: Primary | ICD-10-CM

## 2023-08-01 DIAGNOSIS — L03.116 CELLULITIS OF LEFT FOOT: ICD-10-CM

## 2023-08-01 LAB
ALBUMIN SERPL-MCNC: 3.7 G/DL (ref 3.5–5.2)
ALBUMIN/GLOB SERPL: 1.2 G/DL
ALP SERPL-CCNC: 127 U/L (ref 39–117)
ALT SERPL W P-5'-P-CCNC: 37 U/L (ref 1–41)
ANION GAP SERPL CALCULATED.3IONS-SCNC: 14 MMOL/L (ref 5–15)
AST SERPL-CCNC: 20 U/L (ref 1–40)
BASOPHILS # BLD AUTO: 0.06 10*3/MM3 (ref 0–0.2)
BASOPHILS NFR BLD AUTO: 0.7 % (ref 0–1.5)
BILIRUB SERPL-MCNC: 0.3 MG/DL (ref 0–1.2)
BUN SERPL-MCNC: 50 MG/DL (ref 8–23)
BUN/CREAT SERPL: 33.1 (ref 7–25)
CALCIUM SPEC-SCNC: 8.7 MG/DL (ref 8.6–10.5)
CHLORIDE SERPL-SCNC: 102 MMOL/L (ref 98–107)
CO2 SERPL-SCNC: 22 MMOL/L (ref 22–29)
CREAT SERPL-MCNC: 1.51 MG/DL (ref 0.76–1.27)
CRP SERPL-MCNC: 2.4 MG/DL (ref 0–0.5)
DEPRECATED RDW RBC AUTO: 45.9 FL (ref 37–54)
EGFRCR SERPLBLD CKD-EPI 2021: 47 ML/MIN/1.73
EOSINOPHIL # BLD AUTO: 0.45 10*3/MM3 (ref 0–0.4)
EOSINOPHIL NFR BLD AUTO: 5.5 % (ref 0.3–6.2)
ERYTHROCYTE [DISTWIDTH] IN BLOOD BY AUTOMATED COUNT: 13.8 % (ref 12.3–15.4)
ERYTHROCYTE [SEDIMENTATION RATE] IN BLOOD: 23 MM/HR (ref 0–20)
GLOBULIN UR ELPH-MCNC: 3.1 GM/DL
GLUCOSE SERPL-MCNC: 283 MG/DL (ref 65–99)
HCT VFR BLD AUTO: 35.6 % (ref 37.5–51)
HGB BLD-MCNC: 11.5 G/DL (ref 13–17.7)
IMM GRANULOCYTES # BLD AUTO: 0.01 10*3/MM3 (ref 0–0.05)
IMM GRANULOCYTES NFR BLD AUTO: 0.1 % (ref 0–0.5)
LYMPHOCYTES # BLD AUTO: 0.85 10*3/MM3 (ref 0.7–3.1)
LYMPHOCYTES NFR BLD AUTO: 10.4 % (ref 19.6–45.3)
MCH RBC QN AUTO: 29.3 PG (ref 26.6–33)
MCHC RBC AUTO-ENTMCNC: 32.3 G/DL (ref 31.5–35.7)
MCV RBC AUTO: 90.8 FL (ref 79–97)
MONOCYTES # BLD AUTO: 0.68 10*3/MM3 (ref 0.1–0.9)
MONOCYTES NFR BLD AUTO: 8.3 % (ref 5–12)
NEUTROPHILS NFR BLD AUTO: 6.13 10*3/MM3 (ref 1.7–7)
NEUTROPHILS NFR BLD AUTO: 75 % (ref 42.7–76)
NRBC BLD AUTO-RTO: 0 /100 WBC (ref 0–0.2)
PLATELET # BLD AUTO: 198 10*3/MM3 (ref 140–450)
PMV BLD AUTO: 11.1 FL (ref 6–12)
POTASSIUM SERPL-SCNC: 5 MMOL/L (ref 3.5–5.2)
PROT SERPL-MCNC: 6.8 G/DL (ref 6–8.5)
RBC # BLD AUTO: 3.92 10*6/MM3 (ref 4.14–5.8)
SODIUM SERPL-SCNC: 138 MMOL/L (ref 136–145)
WBC NRBC COR # BLD: 8.18 10*3/MM3 (ref 3.4–10.8)

## 2023-08-01 PROCEDURE — 80053 COMPREHEN METABOLIC PANEL: CPT

## 2023-08-01 PROCEDURE — 85652 RBC SED RATE AUTOMATED: CPT

## 2023-08-01 PROCEDURE — 85025 COMPLETE CBC W/AUTO DIFF WBC: CPT

## 2023-08-01 PROCEDURE — 86140 C-REACTIVE PROTEIN: CPT

## 2023-08-01 PROCEDURE — 36415 COLL VENOUS BLD VENIPUNCTURE: CPT

## 2023-08-04 ENCOUNTER — READMISSION MANAGEMENT (OUTPATIENT)
Dept: CALL CENTER | Facility: HOSPITAL | Age: 78
End: 2023-08-04
Payer: MEDICARE

## 2023-08-04 ENCOUNTER — OFFICE VISIT (OUTPATIENT)
Dept: INTERNAL MEDICINE | Facility: CLINIC | Age: 78
End: 2023-08-04
Payer: MEDICARE

## 2023-08-04 VITALS
DIASTOLIC BLOOD PRESSURE: 82 MMHG | SYSTOLIC BLOOD PRESSURE: 128 MMHG | WEIGHT: 177 LBS | TEMPERATURE: 97.3 F | HEART RATE: 68 BPM | HEIGHT: 75 IN | BODY MASS INDEX: 22.01 KG/M2 | OXYGEN SATURATION: 97 %

## 2023-08-04 DIAGNOSIS — L97.425 DIABETIC ULCER OF LEFT MIDFOOT ASSOCIATED WITH TYPE 2 DIABETES MELLITUS, WITH MUSCLE INVOLVEMENT WITHOUT EVIDENCE OF NECROSIS: ICD-10-CM

## 2023-08-04 DIAGNOSIS — E11.621 DIABETIC ULCER OF LEFT MIDFOOT ASSOCIATED WITH TYPE 2 DIABETES MELLITUS, WITH MUSCLE INVOLVEMENT WITHOUT EVIDENCE OF NECROSIS: ICD-10-CM

## 2023-08-04 DIAGNOSIS — I73.9 PVD (PERIPHERAL VASCULAR DISEASE): Primary | ICD-10-CM

## 2023-08-04 DIAGNOSIS — L03.116 LEFT LEG CELLULITIS: ICD-10-CM

## 2023-08-04 DIAGNOSIS — J30.89 NON-SEASONAL ALLERGIC RHINITIS DUE TO OTHER ALLERGIC TRIGGER: ICD-10-CM

## 2023-08-04 DIAGNOSIS — K40.90 NON-RECURRENT UNILATERAL INGUINAL HERNIA WITHOUT OBSTRUCTION OR GANGRENE: ICD-10-CM

## 2023-08-04 RX ORDER — AZELASTINE 1 MG/ML
2 SPRAY, METERED NASAL 2 TIMES DAILY
Qty: 30 ML | Refills: 2 | Status: SHIPPED | OUTPATIENT
Start: 2023-08-04

## 2023-08-07 ENCOUNTER — LAB (OUTPATIENT)
Dept: LAB | Facility: HOSPITAL | Age: 78
End: 2023-08-07
Payer: MEDICARE

## 2023-08-07 ENCOUNTER — TRANSCRIBE ORDERS (OUTPATIENT)
Dept: LAB | Facility: HOSPITAL | Age: 78
End: 2023-08-07
Payer: MEDICARE

## 2023-08-07 DIAGNOSIS — L03.116 CELLULITIS OF LEFT FOOT: Primary | ICD-10-CM

## 2023-08-07 LAB
ALBUMIN SERPL-MCNC: 3.7 G/DL (ref 3.5–5.2)
ALBUMIN/GLOB SERPL: 1.2 G/DL
ALP SERPL-CCNC: 115 U/L (ref 39–117)
ALT SERPL W P-5'-P-CCNC: 18 U/L (ref 1–41)
ANION GAP SERPL CALCULATED.3IONS-SCNC: 10 MMOL/L (ref 5–15)
AST SERPL-CCNC: 9 U/L (ref 1–40)
BASOPHILS # BLD AUTO: 0.05 10*3/MM3 (ref 0–0.2)
BASOPHILS NFR BLD AUTO: 0.6 % (ref 0–1.5)
BILIRUB SERPL-MCNC: 0.2 MG/DL (ref 0–1.2)
BUN SERPL-MCNC: 45 MG/DL (ref 8–23)
BUN/CREAT SERPL: 26.2 (ref 7–25)
CALCIUM SPEC-SCNC: 8.7 MG/DL (ref 8.6–10.5)
CHLORIDE SERPL-SCNC: 103 MMOL/L (ref 98–107)
CO2 SERPL-SCNC: 24 MMOL/L (ref 22–29)
CREAT SERPL-MCNC: 1.72 MG/DL (ref 0.76–1.27)
CRP SERPL-MCNC: 0.74 MG/DL (ref 0–0.5)
DEPRECATED RDW RBC AUTO: 47.6 FL (ref 37–54)
EGFRCR SERPLBLD CKD-EPI 2021: 40.2 ML/MIN/1.73
EOSINOPHIL # BLD AUTO: 0.46 10*3/MM3 (ref 0–0.4)
EOSINOPHIL NFR BLD AUTO: 5.7 % (ref 0.3–6.2)
ERYTHROCYTE [DISTWIDTH] IN BLOOD BY AUTOMATED COUNT: 14.4 % (ref 12.3–15.4)
ERYTHROCYTE [SEDIMENTATION RATE] IN BLOOD: 20 MM/HR (ref 0–20)
GLOBULIN UR ELPH-MCNC: 3 GM/DL
GLUCOSE SERPL-MCNC: 204 MG/DL (ref 65–99)
HCT VFR BLD AUTO: 38.1 % (ref 37.5–51)
HGB BLD-MCNC: 12.1 G/DL (ref 13–17.7)
IMM GRANULOCYTES # BLD AUTO: 0.01 10*3/MM3 (ref 0–0.05)
IMM GRANULOCYTES NFR BLD AUTO: 0.1 % (ref 0–0.5)
LYMPHOCYTES # BLD AUTO: 0.99 10*3/MM3 (ref 0.7–3.1)
LYMPHOCYTES NFR BLD AUTO: 12.2 % (ref 19.6–45.3)
MCH RBC QN AUTO: 28.9 PG (ref 26.6–33)
MCHC RBC AUTO-ENTMCNC: 31.8 G/DL (ref 31.5–35.7)
MCV RBC AUTO: 90.9 FL (ref 79–97)
MONOCYTES # BLD AUTO: 0.62 10*3/MM3 (ref 0.1–0.9)
MONOCYTES NFR BLD AUTO: 7.7 % (ref 5–12)
NEUTROPHILS NFR BLD AUTO: 5.97 10*3/MM3 (ref 1.7–7)
NEUTROPHILS NFR BLD AUTO: 73.7 % (ref 42.7–76)
NRBC BLD AUTO-RTO: 0 /100 WBC (ref 0–0.2)
PLATELET # BLD AUTO: 238 10*3/MM3 (ref 140–450)
PMV BLD AUTO: 10.5 FL (ref 6–12)
POTASSIUM SERPL-SCNC: 5.1 MMOL/L (ref 3.5–5.2)
PROT SERPL-MCNC: 6.7 G/DL (ref 6–8.5)
RBC # BLD AUTO: 4.19 10*6/MM3 (ref 4.14–5.8)
SODIUM SERPL-SCNC: 137 MMOL/L (ref 136–145)
WBC NRBC COR # BLD: 8.1 10*3/MM3 (ref 3.4–10.8)

## 2023-08-07 PROCEDURE — 85025 COMPLETE CBC W/AUTO DIFF WBC: CPT | Performed by: INTERNAL MEDICINE

## 2023-08-07 PROCEDURE — 80053 COMPREHEN METABOLIC PANEL: CPT | Performed by: INTERNAL MEDICINE

## 2023-08-07 PROCEDURE — 36415 COLL VENOUS BLD VENIPUNCTURE: CPT | Performed by: INTERNAL MEDICINE

## 2023-08-07 PROCEDURE — 85652 RBC SED RATE AUTOMATED: CPT | Performed by: INTERNAL MEDICINE

## 2023-08-07 PROCEDURE — 86140 C-REACTIVE PROTEIN: CPT | Performed by: INTERNAL MEDICINE

## 2023-08-08 ENCOUNTER — READMISSION MANAGEMENT (OUTPATIENT)
Dept: CALL CENTER | Facility: HOSPITAL | Age: 78
End: 2023-08-08
Payer: MEDICARE

## 2023-08-08 NOTE — OUTREACH NOTE
Medical Week 2 Survey      Flowsheet Row Responses   [facility] patient discharged from? [place]   Does the patient have one of the following disease processes/diagnoses(primary or secondary)? Other   Week 2 attempt successful? Yes   Call start time 1247   Discharge diagnosis Diabetic foot infection, Cellulitis of L Foot   Call end time 1248   Is patient permission given to speak with other caregiver? Yes   List who call center can speak with Nerissa spouse   Person spoke with today (if not patient) and relationship Nerissa spouse   Medication alerts for this patient IV ABX- Ceftriaxone   Meds reviewed with patient/caregiver? Yes   Is the patient taking all medications as directed (includes completed medication regime)? Yes   Does the patient have a primary care provider?  Yes   Has the patient kept scheduled appointments due by today? Yes   What is the patient's perception of their health status since discharge? Improving   Is the patient/caregiver able to teach back signs and symptoms related to disease process for when to call PCP? Yes   Is the patient/caregiver able to teach back signs and symptoms related to disease process for when to call 911? Yes   Is the patient/caregiver able to teach back the hierarchy of who to call/visit for symptoms/problems? PCP, Specialist, Home health nurse, Urgent Care, ED, 911 Yes   Week 2 Call Completed? Yes   Graduated Yes   Did the patient feel the follow up calls were helpful during their recovery period? Yes   Was the number of calls appropriate? Yes   Graduated/Revoked comments Pt has been receiving daily infusions per spouse   Call end time 1248            Kailey CARVER - Registered Nurse

## 2023-08-23 ENCOUNTER — OFFICE VISIT (OUTPATIENT)
Dept: CARDIAC SURGERY | Facility: CLINIC | Age: 78
End: 2023-08-23
Payer: MEDICARE

## 2023-08-23 VITALS
HEART RATE: 78 BPM | BODY MASS INDEX: 23 KG/M2 | TEMPERATURE: 97.1 F | DIASTOLIC BLOOD PRESSURE: 78 MMHG | SYSTOLIC BLOOD PRESSURE: 122 MMHG | OXYGEN SATURATION: 99 % | HEIGHT: 75 IN | WEIGHT: 185 LBS

## 2023-08-23 DIAGNOSIS — Z89.422 S/P AMPUTATION OF LESSER TOE, LEFT: Primary | ICD-10-CM

## 2023-08-23 RX ORDER — AMOXICILLIN AND CLAVULANATE POTASSIUM 875; 125 MG/1; MG/1
TABLET, FILM COATED ORAL
COMMUNITY
Start: 2023-08-08

## 2023-08-23 NOTE — PROGRESS NOTES
[facility] Office Follow Up Note    Date of Encounter: 2023     Name: Jermaine Singh  : 1945     Referred By: No ref. provider found  PCP: Marysol Shrestha MD    Chief Complaint:    Chief Complaint   Patient presents with    gangrene      3 month follow up for PVD - s/p right 2nd toe amp on 3/2023       Subjective      History of Present Illness:    It was nice to see Jermaine Singh in follow up.  He is a pleasant 78 y.o. male with PMH significant for hypertension, hyperlipidemia, coronary artery disease s/p CABG x3 by Dr. Au on 3/22/2019, chronic heart failure, DVT, type 2 diabetes mellitus, GERD, chronic kidney disease stage III, chronic anemia, peripheral vascular disease, diabetic foot ulcer, left great toe amputation in , diabetic neuropathy, and osteomyelitis of the left second toe with exposure/ulceration of the middle phalangeal joint space s/p left second toe amputation with primary closure by Dr. Márquez on 3/17/2023.       Mr. Singh was seen in office by myself on 2023 at 18 weeks for his 3rd postop visit.  Upon examination the amputation site had healed without any eschar formation.  Patient presented to [facility] emergency department on  for one day history of left foot erythema, swelling, and drainage.  Dr. Au evaluated patient and felt the only amputation that would potentially heal at this point would be a below-knee amputation.  Patient presents today for follow-up.  He is accompanied by his wife.  Mr. Singh is following with ID, Dr. Rubin and continues on antibiotic therapy.  He was recently evaluated by Dr. Mullins with [facility] for a second opinion.  Patient now wishes to be evaluated at [place] for a 3rd opinion in regard to his PVD.  He does not wish to undergo amputation and does not feel as though he is to that point.     Review of Systems:  Review of Systems   Constitutional: Negative for chills, decreased  appetite, diaphoresis, fever, malaise/fatigue, night sweats, weight gain and weight loss.   HENT:  Negative for hoarse voice.    Eyes:  Negative for blurred vision, double vision and visual disturbance.   Cardiovascular:  Negative for chest pain, claudication, dyspnea on exertion, irregular heartbeat, leg swelling, near-syncope, orthopnea, palpitations, paroxysmal nocturnal dyspnea and syncope.   Respiratory:  Negative for cough, hemoptysis, shortness of breath, sputum production and wheezing.    Hematologic/Lymphatic: Negative for adenopathy and bleeding problem. Does not bruise/bleed easily.   Skin:  Positive for color change (left foot) and poor wound healing. Negative for nail changes and rash.   Musculoskeletal:  Negative for back pain, falls and muscle cramps.   Gastrointestinal:  Negative for abdominal pain, dysphagia and heartburn.   Genitourinary:  Negative for flank pain.   Neurological:  Negative for brief paralysis, disturbances in coordination, dizziness, focal weakness, headaches, light-headedness, loss of balance, numbness, paresthesias, sensory change, vertigo and weakness.   Psychiatric/Behavioral:  Negative for depression and suicidal ideas.    Allergic/Immunologic: Negative for persistent infections.     I have reviewed the following portions of the patient's history: problem list, current medications, allergies, past surgical history, past medical history, past social history, past family history, and ROS and confirm it's accurate.    Allergies:  Allergies   Allergen Reactions    Vancomycin Other (See Comments)     Kidney failure per last admission       Medications:      Current Outpatient Medications:     acetaminophen (TYLENOL) 325 MG tablet, Take 2 tablets by mouth Every 6 (Six) Hours As Needed for Mild Pain., Disp: , Rfl:     amoxicillin-clavulanate (AUGMENTIN) 875-125 MG per tablet, Take  by mouth., Disp: , Rfl:     apixaban (ELIQUIS) 5 MG tablet tablet, Take 1 tablet by mouth Every 12  (Twelve) Hours. Indications: DVT/PE (active thrombosis), Disp: 180 tablet, Rfl: 0    aspirin 81 MG chewable tablet, Chew 1 tablet Daily., Disp: 90 tablet, Rfl: 3    atorvastatin (LIPITOR) 20 MG tablet, Take 1 tablet by mouth Every Night., Disp: 90 tablet, Rfl: 3    azelastine (ASTELIN) 0.1 % nasal spray, 2 sprays into the nostril(s) as directed by provider 2 (Two) Times a Day. Use in each nostril as directed, Disp: 30 mL, Rfl: 2    bumetanide (BUMEX) 1 MG tablet, Take 1 tablet by mouth 2 (Two) Times a Day. (Patient taking differently: Take 1 tablet by mouth 2 (Two) Times a Day. Qd-bid), Disp: 60 tablet, Rfl: 6    carvedilol (COREG) 3.125 MG tablet, Take 1 tablet by mouth 2 (Two) Times a Day With Meals., Disp: , Rfl:     Cholecalciferol (VITAMIN D3) 5000 units tablet tablet, Take 1 tablet by mouth Daily., Disp: , Rfl:     Diclofenac Sodium (VOLTAREN) 1 % gel gel, Apply 2 g topically to the appropriate area as directed 4 (Four) Times a Day As Needed (pain)., Disp: , Rfl:     Elastic Bandages & Supports (Hernia Support Right Medium) misc, Use daily, Disp: 1 each, Rfl: 0    famotidine (PEPCID) 20 MG tablet, Take 1 tablet by mouth Daily., Disp: 90 tablet, Rfl: 3    glucose blood (OneTouch Verio) test strip, 1 each by Other route 2 (Two) Times a Day. Use as instructed, Disp: 100 each, Rfl: 3    insulin detemir (LEVEMIR) 100 UNIT/ML injection, Inject 8 Units under the skin into the appropriate area as directed Every Night. (Patient taking differently: Inject 8 Units under the skin into the appropriate area as directed Daily.), Disp: , Rfl: 12    Insulin Lispro (humaLOG) 100 UNIT/ML injection, Inject 3 Units under the skin into the appropriate area as directed 3 (Three) Times a Day Before Meals. Under 150 3 unit 150-200 4 units 200-250- 5 unit, Disp: , Rfl:     levothyroxine (SYNTHROID, LEVOTHROID) 88 MCG tablet, Take 1 tablet by mouth Every Morning., Disp: 90 tablet, Rfl: 3    linaclotide (Linzess) 290 MCG capsule capsule,  Take 1 capsule by mouth Every Morning Before Breakfast., Disp: 30 capsule, Rfl: 2    losartan (COZAAR) 100 MG tablet, Take 1 tablet by mouth Daily., Disp: , Rfl:     magnesium oxide (MAG-OX) 400 MG tablet, Take 1 tablet by mouth Daily., Disp: 90 tablet, Rfl: 3    Misc. Devices (Rollator Ultra-Light) misc, 1 each Daily., Disp: 1 each, Rfl: 0    nitroglycerin (NITROSTAT) 0.4 MG SL tablet, Place 1 tablet under the tongue Every 5 (Five) Minutes As Needed for Chest Pain. Take no more than 3 doses in 15 minutes., Disp: 9 tablet, Rfl: 12    nystatin (MYCOSTATIN) 853038 UNIT/GM powder, Apply  topically to the appropriate area as directed 2 (Two) Times a Day., Disp: 60 g, Rfl: 5    tamsulosin (FLOMAX) 0.4 MG capsule 24 hr capsule, Take 1 capsule by mouth Daily., Disp: 90 capsule, Rfl: 1    Xiidra 5 % ophthalmic solution, Administer 1 drop to both eyes 2 (Two) Times a Day., Disp: , Rfl:     History:   Past Medical History:   Diagnosis Date    Allergic     Arthritis     Asthma     Coronary artery disease     Diabetes mellitus 2000    started on inuslin 12/2018; started on po meds in 2000; checking blood sugars daily     Disease of thyroid gland     po meds daily for hypothyroidism     Elevated serum creatinine 11/15/2022    Elevated troponin 10/02/2022    Generalized weakness 11/15/2022    History of fracture as a child     rt leg- severe     Hyperlipidemia     Hypertension     Hypothyroidism     PAF (paroxysmal atrial fibrillation) 07/23/2023    Peripheral neuropathy     Peripheral vascular disease     s/p angiogram 2/19-needs stent in left leg     Pleural effusion, bilateral 11/15/2022    Proximal phalanx fracture of the second digit extending into the second metatarsal joint 07/28/2022    RBBB     Right knee pain     Unstable angina 02/12/2019    Added automatically from request for surgery [id number]    Vitamin D deficiency        Past Surgical History:   Procedure Laterality Date    AMPUTATION DIGIT Left 01/17/2023     Procedure: AMPUTATION DIGIT LEFT;  Surgeon: Gopal Márquez MD;  Location:  [facility];  Service: Vascular;  Laterality: Left;    APPENDECTOMY      CARDIAC CATHETERIZATION N/A 02/14/2019    Procedure: Left Heart Cath;  Surgeon: Cooper Apodaca MD;  Location:  [facility];  Service: Cardiology    CARDIAC ELECTROPHYSIOLOGY PROCEDURE N/A 05/18/2022    Procedure: DEVICE IMPLANT;  Surgeon: Cooper Apodaca MD;  Location:  [facility];  Service: Cardiology;  Laterality: N/A;    CARDIAC SURGERY      COLONOSCOPY      CORONARY ARTERY BYPASS GRAFT N/A 03/22/2019    Procedure: MEDIAN STERNOTOMY, CORONARY ARTERY BYPASS GRAFT X3, UTILIZING THE LEFT INTERNAL MAMMARY ARTERY, EVH AND OPEN HARVEST OF THE RIGHT GREATER SAPHENOUS VEIN, EXPLORATION OF THE LEFT LEG;  Surgeon: Ap Au MD;  Location:  [facility];  Service: Cardiothoracic    EYE SURGERY Bilateral     cataracts     FRACTURE SURGERY      INTERVENTIONAL RADIOLOGY PROCEDURE N/A 07/29/2021    Procedure: Abdominal Aortagram with Runoff;  Surgeon: Jermaine Hernandez MD;  Location:  [facility];  Service: Cardiovascular;  Laterality: N/A;    KNEE ARTHROSCOPY      right x 2, left x 1    LACERATION REPAIR      right leg    LEG SURGERY      2 for fracture of rt leg     TONSILLECTOMY      Adnoidectomy    TRANS METATARSAL AMPUTATION Left 08/02/2022    Procedure: GREAT TOE AMPUTATION LEFT;  Surgeon: Gopal Márquez MD;  Location:  [facility];  Service: Vascular;  Laterality: Left;       Social History     Socioeconomic History    Marital status:     Number of children: 2   Tobacco Use    Smoking status: Never     Passive exposure: Past    Smokeless tobacco: Never   Vaping Use    Vaping Use: Never used   Substance and Sexual Activity    Alcohol use: Not Currently     Comment: every 2-3 months    Drug use: No    Sexual activity: Defer        Family History   Problem Relation Age of Onset    Coronary artery disease  "Mother     Diabetes Mother     Hyperlipidemia Mother     Cancer Father        Objective     Physical Exam:  Vitals:    08/23/23 1033   BP: 122/78   BP Location: Right arm   Patient Position: Sitting   Pulse: 78   Temp: 97.1 øF (36.2 øC)   SpO2: 99%   Weight: 83.9 kg (185 lb)   Height: 190.5 cm (75\")      Body mass index is 23.12 kg/mý.    Physical Exam  Vitals reviewed.   Constitutional:       General: He is not in acute distress.  Eyes:      Pupils: Pupils are equal, round, and reactive to light.   Pulmonary:      Effort: Pulmonary effort is normal.   Musculoskeletal:      Left lower leg: Edema present.   Feet:      Left foot:      Skin integrity: Ulcer present.      Comments: S/p left great and 2nd toe amputation  Neurological:      General: No focal deficit present.      Mental Status: He is alert and oriented to person, place, and time.   Psychiatric:         Mood and Affect: Mood normal.         Behavior: Behavior normal.         Thought Content: Thought content normal.         Judgment: Judgment normal.     Imaging/Labs:         Assessment / Plan      Assessment / Plan:  PMH significant for hypertension, hyperlipidemia, coronary artery disease s/p CABG x3 by Dr. Au on 3/22/2019, chronic heart failure, DVT, type 2 diabetes mellitus, GERD, chronic kidney disease stage III, chronic anemia, peripheral vascular disease, diabetic foot ulcer, left great toe amputation in 2022, diabetic neuropathy, and osteomyelitis of the left second toe with exposure/ulceration of the middle phalangeal joint space s/p left second toe amputation with primary closure by Dr. Márquez on 3/17/2023.       Peripheral vascular disease  Ulceration of left foot  S/p second toe amputation with primary closure, 3/17/2023 (Dr. Márquez)  Seen in office on 5/24/2023 at 18 weeks for his 3rd postop visit.  Upon examination the amputation site had healed without any eschar formation.   Presented to [facility] emergency department on 7/23 for one day " history of left foot erythema, swelling, and drainage.   Dr. Au evaluated patient and felt the only amputation that would potentially heal at this point would be a below-knee amputation.   Presents today for follow-up.  Upon examination the amputation site again remains healed.  Patient is with new but healing ulceration on the dorsal aspect of his left foot.   Continues to follow with ID, Dr. Rubin.  Remains on antibiotic therapy.   Recently evaluated by Dr. Mullins with [facility] for a second opinion.   Patient now wished to be evaluated at [place] for a 3rd opinion in regard to his PV.   He does not wish to under amputation and does not feel as though he is to that point.       Follow Up:   We will follow on an as needed basis.  Patient encouraged to contact our office if he wishes to re-establish care.    Or sooner for any further concerns or worsening sign and symptoms.  Patient encouraged to call the office with any questions or concerns.     Thank you for allowing me to participate in your care.  Best Regards,    DAVID Almeida  [facility]othor[facility] Surgery  08/23/23  10:35 EDT

## 2023-08-29 ENCOUNTER — LAB (OUTPATIENT)
Dept: LAB | Facility: HOSPITAL | Age: 78
End: 2023-08-29
Payer: MEDICARE

## 2023-08-29 ENCOUNTER — TRANSCRIBE ORDERS (OUTPATIENT)
Dept: LAB | Facility: HOSPITAL | Age: 78
End: 2023-08-29
Payer: MEDICARE

## 2023-08-29 DIAGNOSIS — I73.9 PERIPHERAL VASCULAR DISEASE, UNSPECIFIED: Primary | ICD-10-CM

## 2023-08-29 DIAGNOSIS — L03.116 CELLULITIS OF LEFT FOOT: ICD-10-CM

## 2023-08-29 DIAGNOSIS — L97.522 ULCER OF LEFT FOOT, WITH FAT LAYER EXPOSED: ICD-10-CM

## 2023-08-29 DIAGNOSIS — I73.9 PERIPHERAL VASCULAR DISEASE, UNSPECIFIED: ICD-10-CM

## 2023-08-29 LAB
ALBUMIN SERPL-MCNC: 3.8 G/DL (ref 3.5–5.2)
ALBUMIN/GLOB SERPL: 1.2 G/DL
ALP SERPL-CCNC: 98 U/L (ref 39–117)
ALT SERPL W P-5'-P-CCNC: 27 U/L (ref 1–41)
ANION GAP SERPL CALCULATED.3IONS-SCNC: 9 MMOL/L (ref 5–15)
AST SERPL-CCNC: 17 U/L (ref 1–40)
BASOPHILS # BLD AUTO: 0.04 10*3/MM3 (ref 0–0.2)
BASOPHILS NFR BLD AUTO: 0.6 % (ref 0–1.5)
BILIRUB SERPL-MCNC: 0.3 MG/DL (ref 0–1.2)
BUN SERPL-MCNC: 77 MG/DL (ref 8–23)
BUN/CREAT SERPL: 39.9 (ref 7–25)
CALCIUM SPEC-SCNC: 8.9 MG/DL (ref 8.6–10.5)
CHLORIDE SERPL-SCNC: 104 MMOL/L (ref 98–107)
CO2 SERPL-SCNC: 23 MMOL/L (ref 22–29)
CREAT SERPL-MCNC: 1.93 MG/DL (ref 0.76–1.27)
CRP SERPL-MCNC: <0.3 MG/DL (ref 0–0.5)
DEPRECATED RDW RBC AUTO: 53.5 FL (ref 37–54)
EGFRCR SERPLBLD CKD-EPI 2021: 35 ML/MIN/1.73
EOSINOPHIL # BLD AUTO: 0.46 10*3/MM3 (ref 0–0.4)
EOSINOPHIL NFR BLD AUTO: 7.2 % (ref 0.3–6.2)
ERYTHROCYTE [DISTWIDTH] IN BLOOD BY AUTOMATED COUNT: 15.6 % (ref 12.3–15.4)
ERYTHROCYTE [SEDIMENTATION RATE] IN BLOOD: 13 MM/HR (ref 0–20)
GLOBULIN UR ELPH-MCNC: 3.1 GM/DL
GLUCOSE SERPL-MCNC: 252 MG/DL (ref 65–99)
HCT VFR BLD AUTO: 38.5 % (ref 37.5–51)
HGB BLD-MCNC: 12 G/DL (ref 13–17.7)
IMM GRANULOCYTES # BLD AUTO: 0.01 10*3/MM3 (ref 0–0.05)
IMM GRANULOCYTES NFR BLD AUTO: 0.2 % (ref 0–0.5)
LYMPHOCYTES # BLD AUTO: 1.01 10*3/MM3 (ref 0.7–3.1)
LYMPHOCYTES NFR BLD AUTO: 15.8 % (ref 19.6–45.3)
MCH RBC QN AUTO: 28.9 PG (ref 26.6–33)
MCHC RBC AUTO-ENTMCNC: 31.2 G/DL (ref 31.5–35.7)
MCV RBC AUTO: 92.8 FL (ref 79–97)
MONOCYTES # BLD AUTO: 0.47 10*3/MM3 (ref 0.1–0.9)
MONOCYTES NFR BLD AUTO: 7.3 % (ref 5–12)
NEUTROPHILS NFR BLD AUTO: 4.42 10*3/MM3 (ref 1.7–7)
NEUTROPHILS NFR BLD AUTO: 68.9 % (ref 42.7–76)
NRBC BLD AUTO-RTO: 0 /100 WBC (ref 0–0.2)
PLATELET # BLD AUTO: 163 10*3/MM3 (ref 140–450)
PMV BLD AUTO: 12.1 FL (ref 6–12)
POTASSIUM SERPL-SCNC: 5.3 MMOL/L (ref 3.5–5.2)
PROT SERPL-MCNC: 6.9 G/DL (ref 6–8.5)
RBC # BLD AUTO: 4.15 10*6/MM3 (ref 4.14–5.8)
SODIUM SERPL-SCNC: 136 MMOL/L (ref 136–145)
WBC NRBC COR # BLD: 6.41 10*3/MM3 (ref 3.4–10.8)

## 2023-08-29 PROCEDURE — 36415 COLL VENOUS BLD VENIPUNCTURE: CPT

## 2023-08-29 PROCEDURE — 85652 RBC SED RATE AUTOMATED: CPT

## 2023-08-29 PROCEDURE — 85025 COMPLETE CBC W/AUTO DIFF WBC: CPT

## 2023-08-29 PROCEDURE — 86140 C-REACTIVE PROTEIN: CPT

## 2023-08-29 PROCEDURE — 80053 COMPREHEN METABOLIC PANEL: CPT

## 2023-09-15 ENCOUNTER — TELEPHONE (OUTPATIENT)
Dept: INTERNAL MEDICINE | Facility: CLINIC | Age: 78
End: 2023-09-15
Payer: MEDICARE

## 2023-09-15 NOTE — TELEPHONE ENCOUNTER
[facility] ( all locations) usually has options for respite care.  Home health does not come every day, so if looking for in home care, she can try almost family or other agencies who may be able to send round the clock caregivers.

## 2023-09-15 NOTE — TELEPHONE ENCOUNTER
Caller: HIPOLITO DAWN    Relationship: Emergency Contact    Best call back number:[phone]     What is the best time to reach you: ANY    Who are you requesting to speak with (clinical staff, provider,  specific staff member): CLINICAL    Do you know the name of the person who called: HIPOLITO    What was the call regarding: PATIENTS WIFE WOULD LIKE TO GO OUT OF TOWN AND IS LOOKING FOR RECOMMENDATIONS FOR HOME HEALTH OR A FACILITY THAT COULD CARE FOR DANIEL WHILE SHE IS AWAY. PLEASE ADVISE.

## 2023-09-26 ENCOUNTER — LAB (OUTPATIENT)
Dept: LAB | Facility: HOSPITAL | Age: 78
End: 2023-09-26
Payer: MEDICARE

## 2023-09-26 ENCOUNTER — TRANSCRIBE ORDERS (OUTPATIENT)
Dept: LAB | Facility: HOSPITAL | Age: 78
End: 2023-09-26
Payer: MEDICARE

## 2023-09-26 DIAGNOSIS — L03.116 CELLULITIS OF LEFT FOOT: ICD-10-CM

## 2023-09-26 DIAGNOSIS — D72.823 NEUTROPHILIC LEUKEMOID REACTION: ICD-10-CM

## 2023-09-26 DIAGNOSIS — T30.0 BURN: ICD-10-CM

## 2023-09-26 DIAGNOSIS — T30.0 BURN: Primary | ICD-10-CM

## 2023-09-26 LAB
ALBUMIN SERPL-MCNC: 3.8 G/DL (ref 3.5–5.2)
ALBUMIN/GLOB SERPL: 1.2 G/DL
ALP SERPL-CCNC: 94 U/L (ref 39–117)
ALT SERPL W P-5'-P-CCNC: 24 U/L (ref 1–41)
ANION GAP SERPL CALCULATED.3IONS-SCNC: 14 MMOL/L (ref 5–15)
AST SERPL-CCNC: 13 U/L (ref 1–40)
BASOPHILS # BLD AUTO: 0.05 10*3/MM3 (ref 0–0.2)
BASOPHILS NFR BLD AUTO: 0.8 % (ref 0–1.5)
BILIRUB SERPL-MCNC: 0.5 MG/DL (ref 0–1.2)
BUN SERPL-MCNC: 55 MG/DL (ref 8–23)
BUN/CREAT SERPL: 33.1 (ref 7–25)
CALCIUM SPEC-SCNC: 8.9 MG/DL (ref 8.6–10.5)
CHLORIDE SERPL-SCNC: 108 MMOL/L (ref 98–107)
CO2 SERPL-SCNC: 19 MMOL/L (ref 22–29)
CREAT SERPL-MCNC: 1.66 MG/DL (ref 0.76–1.27)
CRP SERPL-MCNC: <0.3 MG/DL (ref 0–0.5)
DEPRECATED RDW RBC AUTO: 51.8 FL (ref 37–54)
EGFRCR SERPLBLD CKD-EPI 2021: 41.9 ML/MIN/1.73
EOSINOPHIL # BLD AUTO: 0.4 10*3/MM3 (ref 0–0.4)
EOSINOPHIL NFR BLD AUTO: 6.3 % (ref 0.3–6.2)
ERYTHROCYTE [DISTWIDTH] IN BLOOD BY AUTOMATED COUNT: 15.1 % (ref 12.3–15.4)
ERYTHROCYTE [SEDIMENTATION RATE] IN BLOOD: 9 MM/HR (ref 0–20)
GLOBULIN UR ELPH-MCNC: 3.1 GM/DL
GLUCOSE SERPL-MCNC: 164 MG/DL (ref 65–99)
HCT VFR BLD AUTO: 39.2 % (ref 37.5–51)
HGB BLD-MCNC: 12.6 G/DL (ref 13–17.7)
IMM GRANULOCYTES # BLD AUTO: 0.02 10*3/MM3 (ref 0–0.05)
IMM GRANULOCYTES NFR BLD AUTO: 0.3 % (ref 0–0.5)
LYMPHOCYTES # BLD AUTO: 1.04 10*3/MM3 (ref 0.7–3.1)
LYMPHOCYTES NFR BLD AUTO: 16.3 % (ref 19.6–45.3)
MCH RBC QN AUTO: 29.9 PG (ref 26.6–33)
MCHC RBC AUTO-ENTMCNC: 32.1 G/DL (ref 31.5–35.7)
MCV RBC AUTO: 92.9 FL (ref 79–97)
MONOCYTES # BLD AUTO: 0.48 10*3/MM3 (ref 0.1–0.9)
MONOCYTES NFR BLD AUTO: 7.5 % (ref 5–12)
NEUTROPHILS NFR BLD AUTO: 4.41 10*3/MM3 (ref 1.7–7)
NEUTROPHILS NFR BLD AUTO: 68.8 % (ref 42.7–76)
NRBC BLD AUTO-RTO: 0 /100 WBC (ref 0–0.2)
PLATELET # BLD AUTO: 140 10*3/MM3 (ref 140–450)
PMV BLD AUTO: 13 FL (ref 6–12)
POTASSIUM SERPL-SCNC: 5.1 MMOL/L (ref 3.5–5.2)
PROT SERPL-MCNC: 6.9 G/DL (ref 6–8.5)
RBC # BLD AUTO: 4.22 10*6/MM3 (ref 4.14–5.8)
SODIUM SERPL-SCNC: 141 MMOL/L (ref 136–145)
WBC NRBC COR # BLD: 6.4 10*3/MM3 (ref 3.4–10.8)

## 2023-09-26 PROCEDURE — 36415 COLL VENOUS BLD VENIPUNCTURE: CPT

## 2023-09-26 PROCEDURE — 86140 C-REACTIVE PROTEIN: CPT

## 2023-09-26 PROCEDURE — 85652 RBC SED RATE AUTOMATED: CPT

## 2023-09-26 PROCEDURE — 80053 COMPREHEN METABOLIC PANEL: CPT

## 2023-09-26 PROCEDURE — 85025 COMPLETE CBC W/AUTO DIFF WBC: CPT

## 2023-10-16 ENCOUNTER — TELEPHONE (OUTPATIENT)
Dept: INTERNAL MEDICINE | Facility: CLINIC | Age: 78
End: 2023-10-16
Payer: MEDICARE

## 2023-10-16 RX ORDER — TAMSULOSIN HYDROCHLORIDE 0.4 MG/1
0.4 CAPSULE ORAL DAILY
Qty: 90 CAPSULE | Refills: 1 | Status: SHIPPED | OUTPATIENT
Start: 2023-10-16

## 2023-10-16 NOTE — TELEPHONE ENCOUNTER
Caller: ALEJO DAWNE    Relationship: Emergency Contact    Best call back number: [phone]     Requested Prescriptions:   Requested Prescriptions     Pending Prescriptions Disp Refills    tamsulosin (FLOMAX) 0.4 MG capsule 24 hr capsule 90 capsule 1     Sig: Take 1 capsule by mouth Daily.        Pharmacy where request should be sent: [facility] [address] [place] [address] [phone] [facility] [phone] FX     Last office visit with prescribing clinician: 8/4/2023   Last telemedicine visit with prescribing clinician: Visit date not found   Next office visit with prescribing clinician: 1/24/2024     Additional details provided by patient:     Does the patient have less than a 3 day supply:  [] Yes  [x] No    Would you like a call back once the refill request has been completed: [] Yes [x] No    If the office needs to give you a call back, can they leave a voicemail: [] Yes [x] No    Cadance Dunaway, RegSched Rep   10/16/23 10:09 EDT            Cheek Interpolation Flap Text: A decision was made to reconstruct the defect utilizing an interpolation axial flap and a staged reconstruction.  A telfa template was made of the defect.  This telfa template was then used to outline the Cheek Interpolation flap.  The donor area for the pedicle flap was then injected with anesthesia.  The flap was excised through the skin and subcutaneous tissue down to the layer of the underlying musculature.  The interpolation flap was carefully excised within this deep plane to maintain its blood supply.  The edges of the donor site were undermined.   The donor site was closed in a primary fashion.  The pedicle was then rotated into position and sutured.  Once the tube was sutured into place, adequate blood supply was confirmed with blanching and refill.  The pedicle was then wrapped with xeroform gauze and dressed appropriately with a telfa and gauze bandage to ensure continued blood supply and protect the attached pedicle.

## 2023-10-16 NOTE — TELEPHONE ENCOUNTER
Caller: HIPOLITO DAWN    Relationship: Emergency Contact    Best call back number: [phone]     What medications are you currently taking:   Current Outpatient Medications on File Prior to Visit   Medication Sig Dispense Refill    acetaminophen (TYLENOL) 325 MG tablet Take 2 tablets by mouth Every 6 (Six) Hours As Needed for Mild Pain.      amoxicillin-clavulanate (AUGMENTIN) 875-125 MG per tablet Take  by mouth.      apixaban (ELIQUIS) 5 MG tablet tablet Take 1 tablet by mouth Every 12 (Twelve) Hours. Indications: DVT/PE (active thrombosis) 180 tablet 0    aspirin 81 MG chewable tablet Chew 1 tablet Daily. 90 tablet 3    atorvastatin (LIPITOR) 20 MG tablet Take 1 tablet by mouth Every Night. 90 tablet 3    azelastine (ASTELIN) 0.1 % nasal spray 2 sprays into the nostril(s) as directed by provider 2 (Two) Times a Day. Use in each nostril as directed 30 mL 2    bumetanide (BUMEX) 1 MG tablet Take 1 tablet by mouth 2 (Two) Times a Day. (Patient taking differently: Take 1 tablet by mouth 2 (Two) Times a Day. Qd-bid) 60 tablet 6    carvedilol (COREG) 3.125 MG tablet Take 1 tablet by mouth 2 (Two) Times a Day With Meals.      Cholecalciferol (VITAMIN D3) 5000 units tablet tablet Take 1 tablet by mouth Daily.      Diclofenac Sodium (VOLTAREN) 1 % gel gel Apply 2 g topically to the appropriate area as directed 4 (Four) Times a Day As Needed (pain).      Elastic Bandages & Supports (Hernia Support Right Medium) misc Use daily 1 each 0    famotidine (PEPCID) 20 MG tablet Take 1 tablet by mouth Daily. 90 tablet 3    glucose blood (OneTouch Verio) test strip 1 each by Other route 2 (Two) Times a Day. Use as instructed 100 each 3    insulin detemir (LEVEMIR) 100 UNIT/ML injection Inject 8 Units under the skin into the appropriate area as directed Every Night. (Patient taking differently: Inject 8 Units under the skin into the appropriate area as directed Daily.)  12    Insulin Lispro (humaLOG) 100 UNIT/ML injection Inject 3  Units under the skin into the appropriate area as directed 3 (Three) Times a Day Before Meals. Under 150 3 unit  150-200 4 units  200-250- 5 unit      levothyroxine (SYNTHROID, LEVOTHROID) 88 MCG tablet Take 1 tablet by mouth Every Morning. 90 tablet 3    linaclotide (Linzess) 290 MCG capsule capsule Take 1 capsule by mouth Every Morning Before Breakfast. 30 capsule 2    losartan (COZAAR) 100 MG tablet Take 1 tablet by mouth Daily.      magnesium oxide (MAG-OX) 400 MG tablet Take 1 tablet by mouth Daily. 90 tablet 3    Misc. Devices (Rollator Ultra-Light) misc 1 each Daily. 1 each 0    nitroglycerin (NITROSTAT) 0.4 MG SL tablet Place 1 tablet under the tongue Every 5 (Five) Minutes As Needed for Chest Pain. Take no more than 3 doses in 15 minutes. 9 tablet 12    nystatin (MYCOSTATIN) 861747 UNIT/GM powder Apply  topically to the appropriate area as directed 2 (Two) Times a Day. 60 g 5    tamsulosin (FLOMAX) 0.4 MG capsule 24 hr capsule Take 1 capsule by mouth Daily. 90 capsule 1    Xiidra 5 % ophthalmic solution Administer 1 drop to both eyes 2 (Two) Times a Day.      [DISCONTINUED] metoprolol succinate XL (TOPROL-XL) 50 MG 24 hr tablet Take 1 tablet by mouth Daily. (Patient not taking: No sig reported) 20 tablet 0     No current facility-administered medications on file prior to visit.          When did you start taking these medications:     Which medication are you concerned about: ALL THE MEDICATIONS THAT THE PATIENT CURRENTLY TAKES     Who prescribed you this medication:     What are your concerns: PATIENT WOULD LIKE TO KNOW IF DR. CANNON COULD REVIEW THE MEDICATIONS THAT HE TAKES TO SEE IF HE STILL NEEDS TO BE ON SOME OF THEM.     How long have you had these concerns:

## 2023-10-18 ENCOUNTER — HOSPITAL ENCOUNTER (EMERGENCY)
Facility: HOSPITAL | Age: 78
Discharge: HOME | End: 2023-10-18
Attending: EMERGENCY MEDICINE
Payer: MEDICARE

## 2023-10-18 VITALS
SYSTOLIC BLOOD PRESSURE: 124 MMHG | WEIGHT: 169 LBS | HEIGHT: 75 IN | TEMPERATURE: 97.5 F | BODY MASS INDEX: 21.01 KG/M2 | HEART RATE: 94 BPM | RESPIRATION RATE: 18 BRPM | OXYGEN SATURATION: 100 % | DIASTOLIC BLOOD PRESSURE: 79 MMHG

## 2023-10-18 DIAGNOSIS — K64.4 EXTERNAL HEMORRHOID, BLEEDING: Primary | ICD-10-CM

## 2023-10-18 LAB
ALBUMIN SERPL-MCNC: 3.8 G/DL (ref 3.5–5.2)
ALBUMIN/GLOB SERPL: 1.2 G/DL
ALP SERPL-CCNC: 89 U/L (ref 39–117)
ALT SERPL W P-5'-P-CCNC: 28 U/L (ref 1–41)
ANION GAP SERPL CALCULATED.3IONS-SCNC: 11 MMOL/L (ref 5–15)
AST SERPL-CCNC: 24 U/L (ref 1–40)
BASOPHILS # BLD AUTO: 0.03 10*3/MM3 (ref 0–0.2)
BASOPHILS NFR BLD AUTO: 0.4 % (ref 0–1.5)
BILIRUB SERPL-MCNC: 0.4 MG/DL (ref 0–1.2)
BUN SERPL-MCNC: 73 MG/DL (ref 8–23)
BUN/CREAT SERPL: 40.1 (ref 7–25)
CALCIUM SPEC-SCNC: 9.1 MG/DL (ref 8.6–10.5)
CHLORIDE SERPL-SCNC: 107 MMOL/L (ref 98–107)
CO2 SERPL-SCNC: 22 MMOL/L (ref 22–29)
CREAT SERPL-MCNC: 1.82 MG/DL (ref 0.76–1.27)
DEPRECATED RDW RBC AUTO: 50.7 FL (ref 37–54)
EGFRCR SERPLBLD CKD-EPI 2021: 37.6 ML/MIN/1.73
EOSINOPHIL # BLD AUTO: 0.28 10*3/MM3 (ref 0–0.4)
EOSINOPHIL NFR BLD AUTO: 3.8 % (ref 0.3–6.2)
ERYTHROCYTE [DISTWIDTH] IN BLOOD BY AUTOMATED COUNT: 14.6 % (ref 12.3–15.4)
GLOBULIN UR ELPH-MCNC: 3.2 GM/DL
GLUCOSE SERPL-MCNC: 285 MG/DL (ref 65–99)
HCT VFR BLD AUTO: 39.6 % (ref 37.5–51)
HGB BLD-MCNC: 12.7 G/DL (ref 13–17.7)
IMM GRANULOCYTES # BLD AUTO: 0.02 10*3/MM3 (ref 0–0.05)
IMM GRANULOCYTES NFR BLD AUTO: 0.3 % (ref 0–0.5)
LYMPHOCYTES # BLD AUTO: 0.93 10*3/MM3 (ref 0.7–3.1)
LYMPHOCYTES NFR BLD AUTO: 12.8 % (ref 19.6–45.3)
MCH RBC QN AUTO: 30.2 PG (ref 26.6–33)
MCHC RBC AUTO-ENTMCNC: 32.1 G/DL (ref 31.5–35.7)
MCV RBC AUTO: 94.1 FL (ref 79–97)
MONOCYTES # BLD AUTO: 0.61 10*3/MM3 (ref 0.1–0.9)
MONOCYTES NFR BLD AUTO: 8.4 % (ref 5–12)
NEUTROPHILS NFR BLD AUTO: 5.41 10*3/MM3 (ref 1.7–7)
NEUTROPHILS NFR BLD AUTO: 74.3 % (ref 42.7–76)
NRBC BLD AUTO-RTO: 0 /100 WBC (ref 0–0.2)
PLATELET # BLD AUTO: 143 10*3/MM3 (ref 140–450)
PMV BLD AUTO: 12.9 FL (ref 6–12)
POTASSIUM SERPL-SCNC: 5.2 MMOL/L (ref 3.5–5.2)
PROT SERPL-MCNC: 7 G/DL (ref 6–8.5)
RBC # BLD AUTO: 4.21 10*6/MM3 (ref 4.14–5.8)
SODIUM SERPL-SCNC: 140 MMOL/L (ref 136–145)
WBC NRBC COR # BLD: 7.28 10*3/MM3 (ref 3.4–10.8)

## 2023-10-18 PROCEDURE — 80053 COMPREHEN METABOLIC PANEL: CPT | Performed by: EMERGENCY MEDICINE

## 2023-10-18 PROCEDURE — 36415 COLL VENOUS BLD VENIPUNCTURE: CPT

## 2023-10-18 PROCEDURE — 85025 COMPLETE CBC W/AUTO DIFF WBC: CPT | Performed by: EMERGENCY MEDICINE

## 2023-10-18 PROCEDURE — 99282 EMERGENCY DEPT VISIT SF MDM: CPT

## 2023-10-18 RX ORDER — HYDROCORTISONE ACETATE 25 MG/1
25 SUPPOSITORY RECTAL 2 TIMES DAILY
Qty: 10 SUPPOSITORY | Refills: 0 | Status: SHIPPED | OUTPATIENT
Start: 2023-10-18 | End: 2023-10-19

## 2023-10-19 ENCOUNTER — OFFICE VISIT (OUTPATIENT)
Dept: INTERNAL MEDICINE | Facility: CLINIC | Age: 78
End: 2023-10-19
Payer: MEDICARE

## 2023-10-19 VITALS
OXYGEN SATURATION: 96 % | HEART RATE: 63 BPM | BODY MASS INDEX: 21.01 KG/M2 | HEIGHT: 75 IN | WEIGHT: 169 LBS | DIASTOLIC BLOOD PRESSURE: 82 MMHG | TEMPERATURE: 97.8 F | SYSTOLIC BLOOD PRESSURE: 124 MMHG

## 2023-10-19 DIAGNOSIS — K64.9 HEMORRHOIDS, UNSPECIFIED HEMORRHOID TYPE: Primary | ICD-10-CM

## 2023-10-19 DIAGNOSIS — R10.13 EPIGASTRIC PAIN: ICD-10-CM

## 2023-10-19 DIAGNOSIS — K21.9 GASTROESOPHAGEAL REFLUX DISEASE, UNSPECIFIED WHETHER ESOPHAGITIS PRESENT: ICD-10-CM

## 2023-10-19 DIAGNOSIS — R63.4 UNINTENTIONAL WEIGHT LOSS: ICD-10-CM

## 2023-10-19 PROCEDURE — 1160F RVW MEDS BY RX/DR IN RCRD: CPT | Performed by: INTERNAL MEDICINE

## 2023-10-19 PROCEDURE — 3079F DIAST BP 80-89 MM HG: CPT | Performed by: INTERNAL MEDICINE

## 2023-10-19 PROCEDURE — 1159F MED LIST DOCD IN RCRD: CPT | Performed by: INTERNAL MEDICINE

## 2023-10-19 PROCEDURE — 3074F SYST BP LT 130 MM HG: CPT | Performed by: INTERNAL MEDICINE

## 2023-10-19 PROCEDURE — 99214 OFFICE O/P EST MOD 30 MIN: CPT | Performed by: INTERNAL MEDICINE

## 2023-10-19 RX ORDER — HYDROCORTISONE 25 MG/G
CREAM TOPICAL 2 TIMES DAILY
Qty: 30 G | Refills: 3 | Status: SHIPPED | OUTPATIENT
Start: 2023-10-19

## 2023-10-19 RX ORDER — AMOXICILLIN AND CLAVULANATE POTASSIUM 875; 125 MG/1; MG/1
1 TABLET, FILM COATED ORAL 2 TIMES DAILY
COMMUNITY

## 2023-10-19 RX ORDER — OCTISALATE, AVOBENZONE, HOMOSALATE, AND OCTOCRYLENE 29.4; 29.4; 49; 25.48 MG/ML; MG/ML; MG/ML; MG/ML
1 LOTION TOPICAL DAILY
COMMUNITY

## 2023-10-19 RX ORDER — FAMOTIDINE 20 MG/1
20 TABLET, FILM COATED ORAL DAILY PRN
Qty: 90 TABLET | Refills: 3 | Status: SHIPPED | OUTPATIENT
Start: 2023-10-19

## 2023-10-19 NOTE — PROGRESS NOTES
Med Management and ER fu (BRBPR)    Subjective   Jermaine Singh is a 78 y.o. male is here today for follow-up.    History of Present Illness  Pt is accompanied by his wife, and was in the ER for his brbpr, was found to have a small hemorrhoid, also has also has a new MD for his  kidneys and has follow up.    Notes he is on multiple meds and wants to avoid polypharmacy.    On abx for his toe infection.      Current Outpatient Medications:     acetaminophen (TYLENOL) 325 MG tablet, Take 2 tablets by mouth Every 6 (Six) Hours As Needed for Mild Pain., Disp: , Rfl:     apixaban (ELIQUIS) 5 MG tablet tablet, Take 1 tablet by mouth Every 12 (Twelve) Hours. Indications: DVT/PE (active thrombosis), Disp: 180 tablet, Rfl: 0    aspirin 81 MG chewable tablet, Chew 1 tablet Daily., Disp: 90 tablet, Rfl: 3    atorvastatin (LIPITOR) 20 MG tablet, Take 1 tablet by mouth Every Night., Disp: 90 tablet, Rfl: 3    azelastine (ASTELIN) 0.1 % nasal spray, 2 sprays into the nostril(s) as directed by provider 2 (Two) Times a Day. Use in each nostril as directed, Disp: 30 mL, Rfl: 2    bumetanide (BUMEX) 1 MG tablet, Take 1 tablet by mouth 2 (Two) Times a Day. (Patient taking differently: Take 1 tablet by mouth 2 (Two) Times a Day. Qd-bid), Disp: 60 tablet, Rfl: 6    carvedilol (COREG) 3.125 MG tablet, Take 1 tablet by mouth 2 (Two) Times a Day With Meals., Disp: , Rfl:     Cholecalciferol (VITAMIN D3) 5000 units tablet tablet, Take 1 tablet by mouth Daily., Disp: , Rfl:     Diclofenac Sodium (VOLTAREN) 1 % gel gel, Apply 2 g topically to the appropriate area as directed 4 (Four) Times a Day As Needed (pain)., Disp: , Rfl:     famotidine (PEPCID) 20 MG tablet, Take 1 tablet by mouth Daily As Needed for Heartburn., Disp: 90 tablet, Rfl: 3    glucose blood (OneTouch Verio) test strip, 1 each by Other route 2 (Two) Times a Day. Use as instructed, Disp: 100 each, Rfl: 3    insulin detemir (LEVEMIR) 100 UNIT/ML injection, Inject 10 Units under  the skin into the appropriate area as directed Every Night., Disp: , Rfl:     Insulin Lispro (humaLOG) 100 UNIT/ML injection, Inject 3 Units under the skin into the appropriate area as directed 3 (Three) Times a Day Before Meals. Under 150 3 unit 150-200 4 units 200-250- 5 unit, Disp: , Rfl:     levothyroxine (SYNTHROID, LEVOTHROID) 88 MCG tablet, Take 1 tablet by mouth Every Morning., Disp: 90 tablet, Rfl: 3    linaclotide (Linzess) 290 MCG capsule capsule, Take 1 capsule by mouth Every Morning Before Breakfast., Disp: 30 capsule, Rfl: 2    losartan (COZAAR) 100 MG tablet, Take 1 tablet by mouth Daily., Disp: , Rfl:     Misc. Devices (Rollator Ultra-Light) misc, 1 each Daily., Disp: 1 each, Rfl: 0    nitroglycerin (NITROSTAT) 0.4 MG SL tablet, Place 1 tablet under the tongue Every 5 (Five) Minutes As Needed for Chest Pain. Take no more than 3 doses in 15 minutes., Disp: 9 tablet, Rfl: 12    nystatin (MYCOSTATIN) 909823 UNIT/GM powder, Apply  topically to the appropriate area as directed 2 (Two) Times a Day., Disp: 60 g, Rfl: 5    tamsulosin (FLOMAX) 0.4 MG capsule 24 hr capsule, Take 1 capsule by mouth Daily., Disp: 90 capsule, Rfl: 1    Xiidra 5 % ophthalmic solution, Administer 1 drop to both eyes 2 (Two) Times a Day., Disp: , Rfl:     amoxicillin-clavulanate (AUGMENTIN) 875-125 MG per tablet, Take 1 tablet by mouth 2 (Two) Times a Day., Disp: , Rfl:     Elastic Bandages & Supports (Hernia Support Right Medium) misc, Use daily, Disp: 1 each, Rfl: 0    Hydrocortisone, Perianal, (ANUSOL-HC) 2.5 % rectal cream, Insert  into the rectum 2 (Two) Times a Day., Disp: 30 g, Rfl: 3    Probiotic Product (Align) 4 MG capsule, Take 1 each by mouth Daily., Disp: , Rfl:       The following portions of the patient's history were reviewed and updated as appropriate: allergies, current medications, past family history, past medical history, past social history, past surgical history and problem list.    Review of Systems  "  Constitutional:  Positive for fatigue. Negative for chills and fever.   HENT:  Negative for ear discharge, ear pain, sinus pressure and sore throat.    Respiratory:  Negative for cough, chest tightness and shortness of breath.    Cardiovascular:  Negative for chest pain, palpitations and leg swelling.   Gastrointestinal:  Positive for constipation. Negative for diarrhea, nausea and vomiting.   Musculoskeletal:  Negative for arthralgias, back pain and myalgias.   Neurological:  Positive for weakness. Negative for dizziness, syncope and headaches.   Psychiatric/Behavioral:  Negative for confusion and sleep disturbance.        Objective   /82   Pulse 63   Temp 97.8 °F (36.6 °C)   Ht 190.5 cm (75\")   Wt 76.7 kg (169 lb)   SpO2 96% Comment: ra  BMI 21.12 kg/m²   Physical Exam  Vitals and nursing note reviewed.   Constitutional:       Appearance: He is well-developed.   HENT:      Head: Normocephalic and atraumatic.      Right Ear: External ear normal.      Left Ear: External ear normal.      Mouth/Throat:      Pharynx: No oropharyngeal exudate.   Eyes:      Conjunctiva/sclera: Conjunctivae normal.      Pupils: Pupils are equal, round, and reactive to light.   Neck:      Thyroid: No thyromegaly.   Cardiovascular:      Rate and Rhythm: Normal rate and regular rhythm.      Pulses: Normal pulses.      Heart sounds: Normal heart sounds. No murmur heard.     No friction rub. No gallop.   Pulmonary:      Effort: Pulmonary effort is normal.      Breath sounds: Normal breath sounds.   Abdominal:      General: Bowel sounds are normal. There is no distension.      Palpations: Abdomen is soft.      Tenderness: There is no abdominal tenderness.   Musculoskeletal:      Cervical back: Neck supple.   Skin:     General: Skin is warm and dry.      Findings: Lesion present.   Neurological:      Mental Status: He is alert and oriented to person, place, and time.      Cranial Nerves: No cranial nerve deficit.      Motor: " Weakness present.      Gait: Gait abnormal.   Psychiatric:         Judgment: Judgment normal.         BMI is within normal parameters. No other follow-up for BMI required.       Results for orders placed or performed during the hospital encounter of 10/18/23   Comprehensive Metabolic Panel    Specimen: Blood   Result Value Ref Range    Glucose 285 (H) 65 - 99 mg/dL    BUN 73 (H) 8 - 23 mg/dL    Creatinine 1.82 (H) 0.76 - 1.27 mg/dL    Sodium 140 136 - 145 mmol/L    Potassium 5.2 3.5 - 5.2 mmol/L    Chloride 107 98 - 107 mmol/L    CO2 22.0 22.0 - 29.0 mmol/L    Calcium 9.1 8.6 - 10.5 mg/dL    Total Protein 7.0 6.0 - 8.5 g/dL    Albumin 3.8 3.5 - 5.2 g/dL    ALT (SGPT) 28 1 - 41 U/L    AST (SGOT) 24 1 - 40 U/L    Alkaline Phosphatase 89 39 - 117 U/L    Total Bilirubin 0.4 0.0 - 1.2 mg/dL    Globulin 3.2 gm/dL    A/G Ratio 1.2 g/dL    BUN/Creatinine Ratio 40.1 (H) 7.0 - 25.0    Anion Gap 11.0 5.0 - 15.0 mmol/L    eGFR 37.6 (L) >60.0 mL/min/1.73   CBC Auto Differential    Specimen: Blood   Result Value Ref Range    WBC 7.28 3.40 - 10.80 10*3/mm3    RBC 4.21 4.14 - 5.80 10*6/mm3    Hemoglobin 12.7 (L) 13.0 - 17.7 g/dL    Hematocrit 39.6 37.5 - 51.0 %    MCV 94.1 79.0 - 97.0 fL    MCH 30.2 26.6 - 33.0 pg    MCHC 32.1 31.5 - 35.7 g/dL    RDW 14.6 12.3 - 15.4 %    RDW-SD 50.7 37.0 - 54.0 fl    MPV 12.9 (H) 6.0 - 12.0 fL    Platelets 143 140 - 450 10*3/mm3    Neutrophil % 74.3 42.7 - 76.0 %    Lymphocyte % 12.8 (L) 19.6 - 45.3 %    Monocyte % 8.4 5.0 - 12.0 %    Eosinophil % 3.8 0.3 - 6.2 %    Basophil % 0.4 0.0 - 1.5 %    Immature Grans % 0.3 0.0 - 0.5 %    Neutrophils, Absolute 5.41 1.70 - 7.00 10*3/mm3    Lymphocytes, Absolute 0.93 0.70 - 3.10 10*3/mm3    Monocytes, Absolute 0.61 0.10 - 0.90 10*3/mm3    Eosinophils, Absolute 0.28 0.00 - 0.40 10*3/mm3    Basophils, Absolute 0.03 0.00 - 0.20 10*3/mm3    Immature Grans, Absolute 0.02 0.00 - 0.05 10*3/mm3    nRBC 0.0 0.0 - 0.2 /100 WBC             Assessment & Plan    Diagnoses and all orders for this visit:    Hemorrhoids, unspecified hemorrhoid type  -     Hydrocortisone, Perianal, (ANUSOL-HC) 2.5 % rectal cream; Insert  into the rectum 2 (Two) Times a Day.    Gastroesophageal reflux disease, unspecified whether esophagitis present  -     famotidine (PEPCID) 20 MG tablet; Take 1 tablet by mouth Daily As Needed for Heartburn.    Unintentional weight loss  Comments:  w/ abdominal pain- and brbpr,proceed with ct  Orders:  -     CT Abdomen Pelvis Without Contrast; Future    Epigastric pain  -     CT Abdomen Pelvis Without Contrast; Future    Other orders  -     amoxicillin-clavulanate (AUGMENTIN) 875-125 MG per tablet; Take 1 tablet by mouth 2 (Two) Times a Day.  -     Probiotic Product (Align) 4 MG capsule; Take 1 each by mouth Daily.  -     insulin detemir (LEVEMIR) 100 UNIT/ML injection; Inject 10 Units under the skin into the appropriate area as directed Every Night.    Reviewed med list- d/c Magnesium.   Change pepcid to prn.         Return for Medicare Wellness, Next scheduled follow up.    Electronically signed by:    Marysol Shrestha MD

## 2023-10-19 NOTE — ED PROVIDER NOTES
Subjective   History of Present Illness  This is a 78-year-old male with past medical history of paroxysmal A-fib and hypertension presented to the emergency department with some rectal bleeding.  The patient states that for the last 2 days he is noted some bright red blood when he is going to wipe.  He is felt a small mass in his back area.  He is having some pain with bowel movements.  Patient states that he has never had hemorrhoids, however it feels like that.  Denies any trauma to the area.  He is not having any associated abdominal pain.  No nausea or vomiting.  Denies any diarrhea.  Is not having fevers or chills.  No headache or change in vision.  No focal weakness.    History provided by:  Patient   used: No        Review of Systems   Constitutional:  Negative for chills and fever.   HENT:  Negative for congestion, ear pain and sore throat.    Eyes:  Negative for visual disturbance.   Respiratory:  Negative for shortness of breath.    Cardiovascular:  Negative for chest pain.   Gastrointestinal:  Positive for rectal pain. Negative for abdominal pain.   Genitourinary:  Negative for difficulty urinating.   Musculoskeletal:  Negative for arthralgias.   Skin:  Negative for rash.   Neurological:  Negative for dizziness, weakness and numbness.   Psychiatric/Behavioral:  Negative for agitation.        Past Medical History:   Diagnosis Date    Allergic     Arthritis     Asthma     Coronary artery disease     Diabetes mellitus 2000    started on inuslin 12/2018; started on po meds in 2000; checking blood sugars daily     Disease of thyroid gland     po meds daily for hypothyroidism     Elevated serum creatinine 11/15/2022    Elevated troponin 10/02/2022    Generalized weakness 11/15/2022    History of fracture as a child     rt leg- severe     Hyperlipidemia     Hypertension     Hypothyroidism     PAF (paroxysmal atrial fibrillation) 07/23/2023    Peripheral neuropathy     Peripheral  vascular disease     s/p angiogram 2/19-needs stent in left leg     Pleural effusion, bilateral 11/15/2022    Proximal phalanx fracture of the second digit extending into the second metatarsal joint 07/28/2022    RBBB     Right knee pain     Unstable angina 02/12/2019    Added automatically from request for surgery [id number]    Vitamin D deficiency        Allergies   Allergen Reactions    Vancomycin Other (See Comments)     Kidney failure per last admission       Past Surgical History:   Procedure Laterality Date    AMPUTATION DIGIT Left 01/17/2023    Procedure: AMPUTATION DIGIT LEFT;  Surgeon: Gopal Márquez MD;  Location:  [facility];  Service: Vascular;  Laterality: Left;    APPENDECTOMY      CARDIAC CATHETERIZATION N/A 02/14/2019    Procedure: Left Heart Cath;  Surgeon: Cooper Apodaca MD;  Location:  [facility];  Service: Cardiology    CARDIAC ELECTROPHYSIOLOGY PROCEDURE N/A 05/18/2022    Procedure: DEVICE IMPLANT;  Surgeon: Cooper Apodaca MD;  Location:  [facility];  Service: Cardiology;  Laterality: N/A;    CARDIAC SURGERY      COLONOSCOPY      CORONARY ARTERY BYPASS GRAFT N/A 03/22/2019    Procedure: MEDIAN STERNOTOMY, CORONARY ARTERY BYPASS GRAFT X3, UTILIZING THE LEFT INTERNAL MAMMARY ARTERY, EVH AND OPEN HARVEST OF THE RIGHT GREATER SAPHENOUS VEIN, EXPLORATION OF THE LEFT LEG;  Surgeon: Ap Au MD;  Location:  [facility];  Service: Cardiothoracic    EYE SURGERY Bilateral     cataracts     FRACTURE SURGERY      INTERVENTIONAL RADIOLOGY PROCEDURE N/A 07/29/2021    Procedure: Abdominal Aortagram with Runoff;  Surgeon: Jermaine Hernandez MD;  Location:  [facility];  Service: Cardiovascular;  Laterality: N/A;    KNEE ARTHROSCOPY      right x 2, left x 1    LACERATION REPAIR      right leg    LEG SURGERY      2 for fracture of rt leg     TONSILLECTOMY      Adnoidectomy    TRANS METATARSAL AMPUTATION Left 08/02/2022    Procedure: GREAT TOE  AMPUTATION LEFT;  Surgeon: Gopal Márquez MD;  Location: [facility];  Service: Vascular;  Laterality: Left;       Family History   Problem Relation Age of Onset    Coronary artery disease Mother     Diabetes Mother     Hyperlipidemia Mother     Cancer Father        Social History     Socioeconomic History    Marital status:     Number of children: 2   Tobacco Use    Smoking status: Never     Passive exposure: Past    Smokeless tobacco: Never   Vaping Use    Vaping Use: Never used   Substance and Sexual Activity    Alcohol use: Not Currently     Comment: every 2-3 months    Drug use: No    Sexual activity: Defer           Objective   Physical Exam  Vitals and nursing note reviewed.   Constitutional:       General: He is not in acute distress.     Appearance: He is not ill-appearing or toxic-appearing.   HENT:      Mouth/Throat:      Pharynx: No posterior oropharyngeal erythema.   Eyes:      Extraocular Movements: Extraocular movements intact.      Pupils: Pupils are equal, round, and reactive to light.   Cardiovascular:      Rate and Rhythm: Normal rate and regular rhythm.   Pulmonary:      Effort: Pulmonary effort is normal. No respiratory distress.   Abdominal:      General: Abdomen is flat. There is no distension.      Palpations: There is no mass.      Tenderness: There is no abdominal tenderness. There is no guarding or rebound.   Genitourinary:     Comments: Patient has evidence of a small external hemorrhoid.  There is no fissure.  No evidence of thrombosis.  No active bleeding  Musculoskeletal:         General: No deformity. Normal range of motion.   Neurological:      General: No focal deficit present.      Mental Status: He is alert.      Sensory: No sensory deficit.      Motor: No weakness.         Procedures           ED Course  ED Course as of 10/18/23 2311   Wed Oct 18, 2023   2149 BP: 124/79 [JK]   2149 Temp: 97.5 °F (36.4 °C) [JK]   2149 Temp src: Oral [JK]   2149 Heart Rate: 94 [JK]   2149  Resp: 18 [JK]   2149 SpO2: 100 %  Patient's repeat vitals, telemetry tracing, and pulse oximetry tracing were directly viewed and interpreted by myself.  Patient hemodynamically stable [JK]   2307 Comprehensive Metabolic Panel(!) [JK]   2307 CBC & Differential(!) [JK]   2307 Laboratory studies were reviewed and interpreted directly by myself.  CMP showed some minor acute kidney injury with a BUN of 73 and creatinine 1.82, CBC showed some minor anemia with a hemoglobin of 12.7, however this is baseline for the patient comparing his previous hemoglobins [JK]   2308 On reevaluation, the patient has not had any further bleeding.  Findings are consistent with an external hemorrhoid.  Patient be discharged with suppositories as well as topical cream.  Patient given follow-up with colorectal surgery.  Strict return precautions.  Verbalized understanding [JK]   2308 I had a discussion with the patient/family regarding diagnosis, diagnostic results, treatment plan, and medications. The patient/family indicated understanding of these instructions. I spent adequate time at the bedside prior to discharge necessary to discuss the aftercare instructions, giving patient education, providing explanations of the results of our evaluations/findings, and my decision making to assure that the patient/family understand the plan of care. Time was allotted to answer questions at that time and throughout the ED course. Patient is required to maintain timely follow up, as discussed. I also discussed the potential for the development of an acute emergent condition requiring further evaluation, return to the ER, admission, or even surgical intervention.  I encouraged the patient to return to the emergency department immediately for any concerns, worsening symptoms, new complaints, or if symptoms persist and they are unable to seek follow-up in a timely fashion. The patient/family expressed understanding and agreement with this plan    Shared  decision making:   After full review of the patient's clinical presentation, review of any work-up including but not limited to laboratory studies and radiology obtained, I had a discussion with the patient.  Treatment options were discussed as well as the risks, benefits and consequences.  I discussed all findings with the patient and family members if available.  During the discussion, treatment goals were understood by all as well as any misconceptions which were addressed with the patient.  Ample time was given for any questions they may have had.  They are in agreement with the treatment plan as well as final disposition. [JK]      ED Course User Index  [JK] Fran Barragan MD                                           Medical Decision Making  This is a 78-year-old male with history of A-fib on anticoagulation presenting to the emergency department with some rectal bleeding.  The patient's symptoms are consistent with external hemorrhoids.  There is no evidence of abscess or thrombosis.  Given the patient's anticoagulation we will assess for possible anemia, however there is no acute blood loss.  He is hemodynamically stable.  No evidence of acute abdomen.  IV access established.  Work-up initiated.      Differential diagnosis: External hemorrhoid, internal hemorrhoid, anal fissure, lower GI bleeding, anemia      Amount and/or Complexity of Data Reviewed  External Data Reviewed: labs, radiology, ECG and notes.     Details: External laboratories, imaging as well as notes were reviewed personally by myself.  All relevant studies were used to guide decision making.     Date of previous record: 8/14/2023    Source of note: Primary care physician    Summary: Patient was seen and evaluated for routine visit.  I did review multiple laboratory studies on file as well as chest x-ray and multiple imaging modalities.  EKG reviewed.  Records reviewed.    Labs: ordered. Decision-making details documented in ED  Course.        Final diagnoses:   External hemorrhoid, bleeding       ED Disposition  ED Disposition       ED Disposition   Discharge    Condition   Stable    Comment   --               [facility] ([facility])  [address] [facility] [address] [phone]  Call in 1 day           Medication List        New Prescriptions      hydrocortisone 25 MG suppository  Commonly known as: ANUSOL-HC  Insert 1 suppository into the rectum 2 (Two) Times a Day for 5 days.            Changed      bumetanide 1 MG tablet  Commonly known as: BUMEX  Take 1 tablet by mouth 2 (Two) Times a Day.  What changed: additional instructions     insulin detemir 100 UNIT/ML injection  Commonly known as: LEVEMIR  Inject 8 Units under the skin into the appropriate area as directed Every Night.  What changed: when to take this               Where to Get Your Medications        These medications were sent to [facility] [address] [place], [place] [address] [place] - [phone]  - [phone]   [address] [facility] [address]      Phone: [phone]   hydrocortisone 25 MG suppository            Fran Barragan MD  10/18/23 1022

## 2023-10-27 ENCOUNTER — TELEPHONE (OUTPATIENT)
Dept: INTERNAL MEDICINE | Facility: CLINIC | Age: 78
End: 2023-10-27

## 2023-10-27 DIAGNOSIS — K21.9 GASTROESOPHAGEAL REFLUX DISEASE, UNSPECIFIED WHETHER ESOPHAGITIS PRESENT: ICD-10-CM

## 2023-10-27 RX ORDER — FAMOTIDINE 20 MG/1
20 TABLET, FILM COATED ORAL 2 TIMES DAILY PRN
Qty: 180 TABLET | Refills: 1 | Status: SHIPPED | OUTPATIENT
Start: 2023-10-27

## 2023-10-27 NOTE — TELEPHONE ENCOUNTER
Spoke with Nerissa and advised that the Famotidine has been increased to 20 mg twice daily and can take the Tums prn for flare up

## 2023-10-27 NOTE — TELEPHONE ENCOUNTER
Caller: HIPOLITO DAWN    Relationship: Emergency Contact    Best call back number: [phone]    Which medication are you concerned about:     famotidine (PEPCID) 20 MG tablet     What are your concerns:     CAN PATIENT TAKE TUMS?    HE IS ON FAMOTIDINE.  IF HE CANNOT TAKE TUMS CAN WE INCREASE FAMOTIDINE?

## 2023-10-27 NOTE — TELEPHONE ENCOUNTER
I've increased his famotidine to 20 mg twice daily. He can also take TUMS prn acid issues if having flare ups.    thanks

## 2023-11-01 ENCOUNTER — TELEPHONE (OUTPATIENT)
Dept: INTERNAL MEDICINE | Facility: CLINIC | Age: 78
End: 2023-11-01

## 2023-11-01 NOTE — TELEPHONE ENCOUNTER
106/70's  this AM, 116/72 about 30 minutes ago.  Weight is down to 166lbs and pt is freezing.  Wife is going to get pt a new electric blanket to see if this helps.  Advised that she can also put blankets in dryer and then place on pt, this is safer.

## 2023-11-01 NOTE — TELEPHONE ENCOUNTER
Please have him cut his losartan to half daily and monitor his blood pressure.  Call if continues to stay low.

## 2023-11-01 NOTE — TELEPHONE ENCOUNTER
Caller: HIPOLITO DAWN    Relationship: Emergency Contact    Best call back number: [phone]     What is the best time to reach you: ANYITME    Who are you requesting to speak with (clinical staff, provider,  specific staff member): DR CANNON    What was the call regarding: PATIENT HAS BEEN HAVING LOW BLOOD PRESSURE AND CHILLS. THE PATIENT'S WIFE IS WONDERING IF HIS BLOOD PRESSURE MEDICATION NEEDS TO BE DECREASED/

## 2023-11-02 NOTE — TELEPHONE ENCOUNTER
ROBII  Patient called into the office and was read PCP's message. The patient stated his numbers were coming back up and stabilizing, however he had experienced some weight loss. For now they will continue to monitor the situation closely.  Phone: [phone]

## 2023-11-16 ENCOUNTER — LAB (OUTPATIENT)
Dept: LAB | Facility: HOSPITAL | Age: 78
End: 2023-11-16
Payer: MEDICARE

## 2023-11-16 ENCOUNTER — TRANSCRIBE ORDERS (OUTPATIENT)
Dept: LAB | Facility: HOSPITAL | Age: 78
End: 2023-11-16
Payer: MEDICARE

## 2023-11-16 DIAGNOSIS — L03.116 CELLULITIS OF LEFT FOOT: Primary | ICD-10-CM

## 2023-11-16 DIAGNOSIS — L03.116 CELLULITIS OF LEFT FOOT: ICD-10-CM

## 2023-11-16 LAB
ALBUMIN SERPL-MCNC: 3.6 G/DL (ref 3.5–5.2)
ALBUMIN/GLOB SERPL: 1.2 G/DL
ALP SERPL-CCNC: 103 U/L (ref 39–117)
ALT SERPL W P-5'-P-CCNC: 21 U/L (ref 1–41)
ANION GAP SERPL CALCULATED.3IONS-SCNC: 13 MMOL/L (ref 5–15)
AST SERPL-CCNC: 17 U/L (ref 1–40)
BILIRUB SERPL-MCNC: 0.4 MG/DL (ref 0–1.2)
BUN SERPL-MCNC: 68 MG/DL (ref 8–23)
BUN/CREAT SERPL: 35.6 (ref 7–25)
CALCIUM SPEC-SCNC: 9.7 MG/DL (ref 8.6–10.5)
CHLORIDE SERPL-SCNC: 106 MMOL/L (ref 98–107)
CO2 SERPL-SCNC: 23 MMOL/L (ref 22–29)
CREAT SERPL-MCNC: 1.91 MG/DL (ref 0.76–1.27)
CRP SERPL-MCNC: 1.47 MG/DL (ref 0–0.5)
DEPRECATED RDW RBC AUTO: 44.1 FL (ref 37–54)
EGFRCR SERPLBLD CKD-EPI 2021: 35.4 ML/MIN/1.73
ERYTHROCYTE [DISTWIDTH] IN BLOOD BY AUTOMATED COUNT: 13.2 % (ref 12.3–15.4)
ERYTHROCYTE [SEDIMENTATION RATE] IN BLOOD: 35 MM/HR (ref 0–20)
GLOBULIN UR ELPH-MCNC: 3.1 GM/DL
GLUCOSE SERPL-MCNC: 186 MG/DL (ref 65–99)
HCT VFR BLD AUTO: 40.4 % (ref 37.5–51)
HGB BLD-MCNC: 13.1 G/DL (ref 13–17.7)
MCH RBC QN AUTO: 29.6 PG (ref 26.6–33)
MCHC RBC AUTO-ENTMCNC: 32.4 G/DL (ref 31.5–35.7)
MCV RBC AUTO: 91.2 FL (ref 79–97)
PLATELET # BLD AUTO: 183 10*3/MM3 (ref 140–450)
PMV BLD AUTO: 12 FL (ref 6–12)
POTASSIUM SERPL-SCNC: 4.5 MMOL/L (ref 3.5–5.2)
PROT SERPL-MCNC: 6.7 G/DL (ref 6–8.5)
RBC # BLD AUTO: 4.43 10*6/MM3 (ref 4.14–5.8)
SODIUM SERPL-SCNC: 142 MMOL/L (ref 136–145)
WBC NRBC COR # BLD AUTO: 9.02 10*3/MM3 (ref 3.4–10.8)

## 2023-11-16 PROCEDURE — 36415 COLL VENOUS BLD VENIPUNCTURE: CPT

## 2023-11-16 PROCEDURE — 80053 COMPREHEN METABOLIC PANEL: CPT

## 2023-11-16 PROCEDURE — 85027 COMPLETE CBC AUTOMATED: CPT

## 2023-11-16 PROCEDURE — 86140 C-REACTIVE PROTEIN: CPT

## 2023-11-16 PROCEDURE — 85652 RBC SED RATE AUTOMATED: CPT

## 2023-11-17 ENCOUNTER — HOSPITAL ENCOUNTER (OUTPATIENT)
Dept: CT IMAGING | Facility: HOSPITAL | Age: 78
Discharge: HOME | End: 2023-11-17
Admitting: INTERNAL MEDICINE
Payer: MEDICARE

## 2023-11-17 DIAGNOSIS — R10.13 EPIGASTRIC PAIN: ICD-10-CM

## 2023-11-17 DIAGNOSIS — R63.4 UNINTENTIONAL WEIGHT LOSS: ICD-10-CM

## 2023-11-17 PROCEDURE — 74176 CT ABD & PELVIS W/O CONTRAST: CPT

## 2023-11-20 ENCOUNTER — TELEPHONE (OUTPATIENT)
Dept: INTERNAL MEDICINE | Facility: CLINIC | Age: 78
End: 2023-11-20
Payer: MEDICARE

## 2023-11-20 NOTE — TELEPHONE ENCOUNTER
----- Message from Marysol Shrestha MD sent at 11/20/2023 12:57 AM EST -----  Please make sure patient has seen the following [facility] message:       Your CT scan shows thickening of the bladder.  You need to see your urologist to have a cystoscopy done.  I will forward the scan reports to Dr. Soto.  Please schedule your appointment with him ASAP..  Thanks.    Please fax a copy of the CT scan to Dr. Sage Soto urologist.

## 2023-11-20 NOTE — TELEPHONE ENCOUNTER
Patients spouse returned call from [facility] about CT scan results. I did read results verbatim to patient spouse. Patient spouse states she will schedule patient an appointment with Dr Soto.

## 2023-11-21 ENCOUNTER — TELEPHONE (OUTPATIENT)
Dept: INTERNAL MEDICINE | Facility: CLINIC | Age: 78
End: 2023-11-21
Payer: MEDICARE

## 2023-11-21 NOTE — TELEPHONE ENCOUNTER
Please let her know that December 21st should be okay.  He can get on the cancellation list with them for any earlier availability.  When I said is an appointment that they should not wait for 3 months or more to see the urologist.

## 2023-11-21 NOTE — TELEPHONE ENCOUNTER
Caller: HIPOLITO DAWN    Relationship: Emergency Contact    Best call back number: [phone]     What is the best time to reach you: ANYTIME    Who are you requesting to speak with (clinical staff, provider,  specific staff member): DR CANNON    What was the call regarding: PATIENT'S WIFE STATED THAT HIS UROLOGIST OFFICE STATED THAT HE CANNOT BE SEEN UNTIL 12/21/23? SHE  DOES NOT KNOW IF THAT IS OK AS HIS MYCHART RESULTS STATED THAT HE NEEDED TO BE SEEN AS SOON AS POSSIBLE FOR ADDITIONAL TESTING?

## 2023-12-07 ENCOUNTER — PATIENT MESSAGE (OUTPATIENT)
Dept: INTERNAL MEDICINE | Facility: CLINIC | Age: 78
End: 2023-12-07
Payer: MEDICARE

## 2023-12-07 ENCOUNTER — TELEPHONE (OUTPATIENT)
Dept: INTERNAL MEDICINE | Facility: CLINIC | Age: 78
End: 2023-12-07
Payer: MEDICARE

## 2023-12-07 DIAGNOSIS — R21 RASH: Primary | ICD-10-CM

## 2023-12-07 NOTE — TELEPHONE ENCOUNTER
"  Caller: Jermaine Singh \"Adi\"    Relationship: Self    Best call back number: [phone]     What is the best time to reach you: ANY    Who are you requesting to speak with (clinical staff, provider,  specific staff member): DR. CANNON OR HER NURSE    What was the call regarding: THE PATIENT HAS AN ITCH ON RIGHT SHOULDERBLADE DOWN RIGHT SIDE AND ARM. THE PATIENT ALSO SAYING THE RIGHT KNEE HAS THE SAME TYPE OF ITCH OCCASIONALLY. IT IS NOW COMING UP ON LEFT SIDE. WHEN LOOKING AT THEY DON'T NOTICE ANYTHING OTHER THAN MAYBE LITTLE BUMPS. THIS STARTED ON MONDAY OR TUESDAY.   IF HE USES CERAVE IT WILL NOT CALM IT DOWN BUT THE STANDARD ALGERA CALMS IT DOWN FOR ABOUT 6 HOURS.  WHAT COULD BE CAUSING THIS? DOES DR. CANNON HAVE ANY SUGGESTIONS ON WHAT HE SHOULD DO? PLEASE CALL AND ADVISE ASAP.     Is it okay if the provider responds through MyChart: NO      "

## 2023-12-07 NOTE — TELEPHONE ENCOUNTER
Caller: HIPOLITO DAWN    Relationship: Emergency Contact    Best call back number: [phone]     What is the best time to reach you: ANY    Who are you requesting to speak with (clinical staff, provider,  specific staff member): PCP OR HER NURSE    What was the call regarding: THE PATIENT'S WIFE ID CALLING BACK BECAUSE HE HAS STARTED TO DEVELOP SPOTS OF RASHES AFTER SHE SPOKE WITH THE NURSE. SHE SAID THAT SHE HAS NOTICED MORE NOW. THEY SENT A PICTURE ON [employer] AND ASK ASKING THAT SOMEONE LOOK AT IT AND ADVISE FURTHER. PLEASE CALL.    Is it okay if the provider responds through [email or web address]: NO PLEASE CALL.

## 2023-12-07 NOTE — TELEPHONE ENCOUNTER
If its mostly itching and no major rash, he should try a cortisone cream on the areas to see if it helps.  If there is impression, to be seen.  Sometimes eczema occurs in different places.

## 2023-12-07 NOTE — TELEPHONE ENCOUNTER
Name: LÓPEZNILESHIPOLITO    Relationship: Emergency Contact    Best Callback Number: [phone]    HUB PROVIDED THE RELAY MESSAGE FROM THE OFFICE   PATIENT VOICED UNDERSTANDING AND HAS NO FURTHER QUESTIONS AT THIS TIME    ADDITIONAL INFORMATION:  CALLER MADE PATIENT AN APPOINTMENT FOR 12/20/2023

## 2023-12-08 RX ORDER — PERMETHRIN 50 MG/G
1 CREAM TOPICAL ONCE
Qty: 1 G | Refills: 0 | Status: SHIPPED | OUTPATIENT
Start: 2023-12-08 | End: 2023-12-08

## 2023-12-14 ENCOUNTER — LAB (OUTPATIENT)
Dept: LAB | Facility: HOSPITAL | Age: 78
End: 2023-12-14
Payer: MEDICARE

## 2023-12-14 ENCOUNTER — TRANSCRIBE ORDERS (OUTPATIENT)
Dept: LAB | Facility: HOSPITAL | Age: 78
End: 2023-12-14
Payer: MEDICARE

## 2023-12-14 DIAGNOSIS — L03.116 CELLULITIS OF LEFT FOOT: ICD-10-CM

## 2023-12-14 DIAGNOSIS — L03.116 CELLULITIS OF LEFT FOOT: Primary | ICD-10-CM

## 2023-12-14 LAB
ALBUMIN SERPL-MCNC: 3.9 G/DL (ref 3.5–5.2)
ALBUMIN/GLOB SERPL: 1.3 G/DL
ALP SERPL-CCNC: 92 U/L (ref 39–117)
ALT SERPL W P-5'-P-CCNC: 14 U/L (ref 1–41)
ANION GAP SERPL CALCULATED.3IONS-SCNC: 12 MMOL/L (ref 5–15)
AST SERPL-CCNC: 11 U/L (ref 1–40)
BASOPHILS # BLD AUTO: 0.06 10*3/MM3 (ref 0–0.2)
BASOPHILS NFR BLD AUTO: 0.8 % (ref 0–1.5)
BILIRUB SERPL-MCNC: 0.4 MG/DL (ref 0–1.2)
BUN SERPL-MCNC: 59 MG/DL (ref 8–23)
BUN/CREAT SERPL: 31.1 (ref 7–25)
CALCIUM SPEC-SCNC: 8.9 MG/DL (ref 8.6–10.5)
CHLORIDE SERPL-SCNC: 109 MMOL/L (ref 98–107)
CK SERPL-CCNC: 107 U/L (ref 20–200)
CO2 SERPL-SCNC: 20 MMOL/L (ref 22–29)
CREAT SERPL-MCNC: 1.9 MG/DL (ref 0.76–1.27)
CRP SERPL-MCNC: <0.3 MG/DL (ref 0–0.5)
DEPRECATED RDW RBC AUTO: 46.5 FL (ref 37–54)
EGFRCR SERPLBLD CKD-EPI 2021: 35.7 ML/MIN/1.73
EOSINOPHIL # BLD AUTO: 0.45 10*3/MM3 (ref 0–0.4)
EOSINOPHIL NFR BLD AUTO: 6 % (ref 0.3–6.2)
ERYTHROCYTE [DISTWIDTH] IN BLOOD BY AUTOMATED COUNT: 14 % (ref 12.3–15.4)
ERYTHROCYTE [SEDIMENTATION RATE] IN BLOOD: 26 MM/HR (ref 0–20)
GLOBULIN UR ELPH-MCNC: 3 GM/DL
GLUCOSE SERPL-MCNC: 190 MG/DL (ref 65–99)
HCT VFR BLD AUTO: 39 % (ref 37.5–51)
HGB BLD-MCNC: 12.8 G/DL (ref 13–17.7)
IMM GRANULOCYTES # BLD AUTO: 0.02 10*3/MM3 (ref 0–0.05)
IMM GRANULOCYTES NFR BLD AUTO: 0.3 % (ref 0–0.5)
LYMPHOCYTES # BLD AUTO: 0.96 10*3/MM3 (ref 0.7–3.1)
LYMPHOCYTES NFR BLD AUTO: 12.8 % (ref 19.6–45.3)
MCH RBC QN AUTO: 30 PG (ref 26.6–33)
MCHC RBC AUTO-ENTMCNC: 32.8 G/DL (ref 31.5–35.7)
MCV RBC AUTO: 91.3 FL (ref 79–97)
MONOCYTES # BLD AUTO: 0.57 10*3/MM3 (ref 0.1–0.9)
MONOCYTES NFR BLD AUTO: 7.6 % (ref 5–12)
NEUTROPHILS NFR BLD AUTO: 5.46 10*3/MM3 (ref 1.7–7)
NEUTROPHILS NFR BLD AUTO: 72.5 % (ref 42.7–76)
NRBC BLD AUTO-RTO: 0 /100 WBC (ref 0–0.2)
PLATELET # BLD AUTO: 174 10*3/MM3 (ref 140–450)
PMV BLD AUTO: 11.9 FL (ref 6–12)
POTASSIUM SERPL-SCNC: 5.4 MMOL/L (ref 3.5–5.2)
PROT SERPL-MCNC: 6.9 G/DL (ref 6–8.5)
RBC # BLD AUTO: 4.27 10*6/MM3 (ref 4.14–5.8)
SODIUM SERPL-SCNC: 141 MMOL/L (ref 136–145)
WBC NRBC COR # BLD AUTO: 7.52 10*3/MM3 (ref 3.4–10.8)

## 2023-12-14 PROCEDURE — 82550 ASSAY OF CK (CPK): CPT

## 2023-12-14 PROCEDURE — 86140 C-REACTIVE PROTEIN: CPT

## 2023-12-14 PROCEDURE — 80053 COMPREHEN METABOLIC PANEL: CPT

## 2023-12-14 PROCEDURE — 85025 COMPLETE CBC W/AUTO DIFF WBC: CPT

## 2023-12-14 PROCEDURE — 36415 COLL VENOUS BLD VENIPUNCTURE: CPT

## 2023-12-14 PROCEDURE — 85652 RBC SED RATE AUTOMATED: CPT

## 2023-12-20 ENCOUNTER — OFFICE VISIT (OUTPATIENT)
Dept: INTERNAL MEDICINE | Facility: CLINIC | Age: 78
End: 2023-12-20
Payer: MEDICARE

## 2023-12-20 VITALS
TEMPERATURE: 97.7 F | SYSTOLIC BLOOD PRESSURE: 130 MMHG | HEIGHT: 75 IN | HEART RATE: 65 BPM | DIASTOLIC BLOOD PRESSURE: 78 MMHG | BODY MASS INDEX: 21.12 KG/M2 | OXYGEN SATURATION: 98 %

## 2023-12-20 DIAGNOSIS — N39.44 URINARY INCONTINENCE, NOCTURNAL ENURESIS: ICD-10-CM

## 2023-12-20 DIAGNOSIS — L30.8 OTHER ECZEMA: Primary | ICD-10-CM

## 2023-12-20 PROCEDURE — 99213 OFFICE O/P EST LOW 20 MIN: CPT | Performed by: INTERNAL MEDICINE

## 2023-12-20 PROCEDURE — 3075F SYST BP GE 130 - 139MM HG: CPT | Performed by: INTERNAL MEDICINE

## 2023-12-20 PROCEDURE — 3078F DIAST BP <80 MM HG: CPT | Performed by: INTERNAL MEDICINE

## 2023-12-20 RX ORDER — VIBEGRON 75 MG/1
1 TABLET, FILM COATED ORAL DAILY
Qty: 14 TABLET | Refills: 0 | COMMUNITY
Start: 2023-12-20

## 2023-12-20 NOTE — PROGRESS NOTES
Rash (Neck, shoulders, and hands)    Subjective   Jermaine Singh is a 78 y.o. male is here today for follow-up.    History of Present Illness  Itchy rash - started over rt upper back then rt arm and now left arm.  Did not use the permethrin as did not feel it was scabies.  Also has to wake up multiple times due to need for urination.    Current Outpatient Medications:     acetaminophen (TYLENOL) 325 MG tablet, Take 2 tablets by mouth Every 6 (Six) Hours As Needed for Mild Pain., Disp: , Rfl:     amoxicillin-clavulanate (AUGMENTIN) 875-125 MG per tablet, Take 1 tablet by mouth 2 (Two) Times a Day., Disp: , Rfl:     apixaban (ELIQUIS) 5 MG tablet tablet, Take 1 tablet by mouth Every 12 (Twelve) Hours. Indications: DVT/PE (active thrombosis), Disp: 180 tablet, Rfl: 0    aspirin 81 MG chewable tablet, Chew 1 tablet Daily., Disp: 90 tablet, Rfl: 3    atorvastatin (LIPITOR) 20 MG tablet, Take 1 tablet by mouth Every Night., Disp: 90 tablet, Rfl: 3    azelastine (ASTELIN) 0.1 % nasal spray, 2 sprays into the nostril(s) as directed by provider 2 (Two) Times a Day. Use in each nostril as directed, Disp: 30 mL, Rfl: 2    bumetanide (BUMEX) 1 MG tablet, Take 1 tablet by mouth 2 (Two) Times a Day. (Patient taking differently: Take 1 tablet by mouth 2 (Two) Times a Day. Qd-bid), Disp: 60 tablet, Rfl: 6    carvedilol (COREG) 3.125 MG tablet, Take 1 tablet by mouth 2 (Two) Times a Day With Meals., Disp: , Rfl:     Cholecalciferol (VITAMIN D3) 5000 units tablet tablet, Take 1 tablet by mouth Daily., Disp: , Rfl:     Diclofenac Sodium (VOLTAREN) 1 % gel gel, Apply 2 g topically to the appropriate area as directed 4 (Four) Times a Day As Needed (pain)., Disp: , Rfl:     Elastic Bandages & Supports (Hernia Support Right Medium) misc, Use daily, Disp: 1 each, Rfl: 0    famotidine (PEPCID) 20 MG tablet, Take 1 tablet by mouth 2 (Two) Times a Day As Needed for Heartburn., Disp: 180 tablet, Rfl: 1    glucose blood (OneTouch Verio) test  strip, 1 each by Other route 2 (Two) Times a Day. Use as instructed, Disp: 100 each, Rfl: 3    Hydrocortisone, Perianal, (ANUSOL-HC) 2.5 % rectal cream, Insert  into the rectum 2 (Two) Times a Day., Disp: 30 g, Rfl: 3    insulin detemir (LEVEMIR) 100 UNIT/ML injection, Inject 10 Units under the skin into the appropriate area as directed Every Night., Disp: , Rfl:     Insulin Lispro (humaLOG) 100 UNIT/ML injection, Inject 3 Units under the skin into the appropriate area as directed 3 (Three) Times a Day Before Meals. Under 150 3 unit 150-200 4 units 200-250- 5 unit, Disp: , Rfl:     levothyroxine (SYNTHROID, LEVOTHROID) 88 MCG tablet, Take 1 tablet by mouth Every Morning., Disp: 90 tablet, Rfl: 3    linaclotide (Linzess) 290 MCG capsule capsule, Take 1 capsule by mouth Every Morning Before Breakfast., Disp: 30 capsule, Rfl: 2    losartan (COZAAR) 100 MG tablet, Take 1 tablet by mouth Daily., Disp: , Rfl:     Misc. Devices (Rollator Ultra-Light) misc, 1 each Daily., Disp: 1 each, Rfl: 0    nitroglycerin (NITROSTAT) 0.4 MG SL tablet, Place 1 tablet under the tongue Every 5 (Five) Minutes As Needed for Chest Pain. Take no more than 3 doses in 15 minutes., Disp: 9 tablet, Rfl: 12    nystatin (MYCOSTATIN) 417333 UNIT/GM powder, Apply  topically to the appropriate area as directed 2 (Two) Times a Day., Disp: 60 g, Rfl: 5    Probiotic Product (Align) 4 MG capsule, Take 1 each by mouth Daily., Disp: , Rfl:     Xiidra 5 % ophthalmic solution, Administer 1 drop to both eyes 2 (Two) Times a Day., Disp: , Rfl:     fluocinonide (LIDEX) 0.05 % cream, Apply topically to affected area BID x 1 week then once daily as needed for rash, Disp: 60 g, Rfl: 2    Vibegron (Gemtesa) 75 MG tablet, Take 1 tablet by mouth Daily., Disp: 14 tablet, Rfl: 0      The following portions of the patient's history were reviewed and updated as appropriate: allergies, current medications, past family history, past medical history, past social history, past  "surgical history and problem list.    Review of Systems   Constitutional: Negative.  Negative for chills and fever.   HENT:  Negative for ear discharge, ear pain, sinus pressure and sore throat.    Respiratory:  Negative for cough, chest tightness and shortness of breath.    Cardiovascular:  Negative for chest pain, palpitations and leg swelling.   Gastrointestinal:  Negative for diarrhea, nausea and vomiting.   Genitourinary:  Positive for frequency.   Musculoskeletal:  Negative for arthralgias, back pain and myalgias.   Skin:  Positive for rash.   Neurological:  Negative for dizziness, syncope and headaches.   Psychiatric/Behavioral:  Negative for confusion and sleep disturbance.        Objective   /78   Pulse 65   Temp 97.7 °F (36.5 °C)   Ht 190.5 cm (75\")   SpO2 98% Comment: ra  BMI 21.12 kg/m²   Physical Exam  Constitutional:       Appearance: He is well-developed.   HENT:      Head: Normocephalic and atraumatic.   Eyes:      General: No scleral icterus.     Pupils: Pupils are equal, round, and reactive to light.   Pulmonary:      Effort: Pulmonary effort is normal. No respiratory distress.   Musculoskeletal:      Cervical back: Normal range of motion and neck supple.   Skin:     Findings: Erythema and rash present. Rash is purpuric.          Neurological:      Mental Status: He is alert and oriented to person, place, and time.   Psychiatric:         Judgment: Judgment normal.         BMI is within normal parameters. No other follow-up for BMI required.       Results for orders placed or performed in visit on 12/14/23   CK    Specimen: Blood   Result Value Ref Range    Creatine Kinase 107 20 - 200 U/L   Comprehensive Metabolic Panel    Specimen: Blood   Result Value Ref Range    Glucose 190 (H) 65 - 99 mg/dL    BUN 59 (H) 8 - 23 mg/dL    Creatinine 1.90 (H) 0.76 - 1.27 mg/dL    Sodium 141 136 - 145 mmol/L    Potassium 5.4 (H) 3.5 - 5.2 mmol/L    Chloride 109 (H) 98 - 107 mmol/L    CO2 20.0 (L) 22.0 - " 29.0 mmol/L    Calcium 8.9 8.6 - 10.5 mg/dL    Total Protein 6.9 6.0 - 8.5 g/dL    Albumin 3.9 3.5 - 5.2 g/dL    ALT (SGPT) 14 1 - 41 U/L    AST (SGOT) 11 1 - 40 U/L    Alkaline Phosphatase 92 39 - 117 U/L    Total Bilirubin 0.4 0.0 - 1.2 mg/dL    Globulin 3.0 gm/dL    A/G Ratio 1.3 g/dL    BUN/Creatinine Ratio 31.1 (H) 7.0 - 25.0    Anion Gap 12.0 5.0 - 15.0 mmol/L    eGFR 35.7 (L) >60.0 mL/min/1.73   C-reactive Protein    Specimen: Blood   Result Value Ref Range    C-Reactive Protein <0.30 0.00 - 0.50 mg/dL   Sedimentation Rate    Specimen: Blood   Result Value Ref Range    Sed Rate 26 (H) 0 - 20 mm/hr   CBC Auto Differential    Specimen: Blood   Result Value Ref Range    WBC 7.52 3.40 - 10.80 10*3/mm3    RBC 4.27 4.14 - 5.80 10*6/mm3    Hemoglobin 12.8 (L) 13.0 - 17.7 g/dL    Hematocrit 39.0 37.5 - 51.0 %    MCV 91.3 79.0 - 97.0 fL    MCH 30.0 26.6 - 33.0 pg    MCHC 32.8 31.5 - 35.7 g/dL    RDW 14.0 12.3 - 15.4 %    RDW-SD 46.5 37.0 - 54.0 fl    MPV 11.9 6.0 - 12.0 fL    Platelets 174 140 - 450 10*3/mm3    Neutrophil % 72.5 42.7 - 76.0 %    Lymphocyte % 12.8 (L) 19.6 - 45.3 %    Monocyte % 7.6 5.0 - 12.0 %    Eosinophil % 6.0 0.3 - 6.2 %    Basophil % 0.8 0.0 - 1.5 %    Immature Grans % 0.3 0.0 - 0.5 %    Neutrophils, Absolute 5.46 1.70 - 7.00 10*3/mm3    Lymphocytes, Absolute 0.96 0.70 - 3.10 10*3/mm3    Monocytes, Absolute 0.57 0.10 - 0.90 10*3/mm3    Eosinophils, Absolute 0.45 (H) 0.00 - 0.40 10*3/mm3    Basophils, Absolute 0.06 0.00 - 0.20 10*3/mm3    Immature Grans, Absolute 0.02 0.00 - 0.05 10*3/mm3    nRBC 0.0 0.0 - 0.2 /100 WBC             Assessment & Plan   Diagnoses and all orders for this visit:    Other eczema  -     fluocinonide (LIDEX) 0.05 % cream; Apply topically to affected area BID x 1 week then once daily as needed for rash    Urinary incontinence, nocturnal enuresis  -     Vibegron (Gemtesa) 75 MG tablet; Take 1 tablet by mouth Daily.    Plan d/w Pt and wife.             Return for Next  scheduled follow up.    Electronically signed by:    Marysol Shrestha MD

## 2024-01-12 RX ORDER — LEVOTHYROXINE SODIUM 88 UG/1
88 TABLET ORAL
Qty: 90 TABLET | Refills: 1 | Status: SHIPPED | OUTPATIENT
Start: 2024-01-12

## 2024-01-12 NOTE — TELEPHONE ENCOUNTER
Caller: HIPOLITO DAWN    Relationship: SPOUSE NOT ON SHARMIN    Best call back number:      Requested Prescriptions:   Requested Prescriptions     Pending Prescriptions Disp Refills    levothyroxine (SYNTHROID, LEVOTHROID) 88 MCG tablet 90 tablet 3     Sig: Take 1 tablet by mouth Every Morning.        Pharmacy where request should be sent: [facility] [address] [place] [address] [phone] [facility] [phone] FX     Last office visit with prescribing clinician: 12/20/2023   Last telemedicine visit with prescribing clinician: Visit date not found   Next office visit with prescribing clinician: 1/24/2024     Additional details provided by patient: PATIENT IS OUT OF MEDICATION AND THIS WAS PRESCRIBED ORIGINALLY BY DR GAN    Does the patient have less than a 3 day supply:  [x] Yes  [] No      Ata Aguero Rep   01/12/24 09:29 EST

## 2024-01-22 ENCOUNTER — TELEPHONE (OUTPATIENT)
Dept: INTERNAL MEDICINE | Facility: CLINIC | Age: 79
End: 2024-01-22
Payer: MEDICARE

## 2024-01-22 NOTE — TELEPHONE ENCOUNTER
"leslye    Caller: Jermaine Singh \"Adi\"     Relationship: SELF    Best call back number:  [phone]    What is your medical concern? PATIENT HAS CONCERNS ABOUT MOBLITY AND ASKING ABOUT GETTING INTO A PHYSICAL THERAPY PROGRAM TO HELP GET HIS STRENGTH BACK AS IT HAS BEEN COLD OUTSIDE AND UNABLE TO EXERCISE.; HE WOULD LIKE SOMETHING ALSO NEAR MIDWAY ; PATIENT HAS CONCERNS AS HE DID DROP WEIGHT RECENTLY AS WELL AND WAS IN THE HOSPITAL TO DROP OFF FLUIDS;     How long has this issue been going on? SINCE JULY    Is your provider already aware of this issue? YES    Have you been treated for this issue? NOT FOR PHYSICAL THERAPY AND PATIENT DOES NOT WANT TO GO TO [facility]         "

## 2024-01-23 NOTE — TELEPHONE ENCOUNTER
[facility] in [place] is probably the closer one. He may not need a referral. He can call them to see if he can start.    thanks

## 2024-01-23 NOTE — TELEPHONE ENCOUNTER
HUB to relay    LM for [facility], what additional questions for pt?  Or is she just wanting to get pt placed?

## 2024-01-23 NOTE — TELEPHONE ENCOUNTER
Name: HIPOLITO DAWN      Relationship: Emergency Contact      Best Callback Number: [phone]      HUB PROVIDED THE RELAY MESSAGE FROM THE OFFICE      PATIENT: HAS FURTHER QUESTIONS AND WOULD LIKE A CALL BACK AT THE FOLLOWING PHONE NUMBER [phone]    ADDITIONAL INFORMATION: SHE SAYS THAT THE PATIENT IS PRACTICALLY AN INVALID AND SHE NEEDS TO SEE IF HE NEEDS TO STAY SOMEWHERE.

## 2024-01-23 NOTE — TELEPHONE ENCOUNTER
HUB to relay    [facility] in [place] is probably the closer one. He may not need a referral. He can call them to see if he can start.       LM for wife [facility], office number given.

## 2024-01-24 ENCOUNTER — OFFICE VISIT (OUTPATIENT)
Dept: INTERNAL MEDICINE | Facility: CLINIC | Age: 79
End: 2024-01-24
Payer: MEDICARE

## 2024-01-24 VITALS
HEART RATE: 60 BPM | SYSTOLIC BLOOD PRESSURE: 130 MMHG | DIASTOLIC BLOOD PRESSURE: 74 MMHG | RESPIRATION RATE: 18 BRPM | BODY MASS INDEX: 21.12 KG/M2 | HEIGHT: 75 IN | OXYGEN SATURATION: 94 %

## 2024-01-24 DIAGNOSIS — Z11.59 ENCOUNTER FOR SCREENING FOR VIRAL DISEASE: ICD-10-CM

## 2024-01-24 DIAGNOSIS — E03.9 HYPOTHYROIDISM (ACQUIRED): Chronic | ICD-10-CM

## 2024-01-24 DIAGNOSIS — E78.2 MIXED HYPERLIPIDEMIA: ICD-10-CM

## 2024-01-24 DIAGNOSIS — R26.2 DIFFICULTY WALKING: ICD-10-CM

## 2024-01-24 DIAGNOSIS — F91.9 BEHAVIOR DISTURBANCE: ICD-10-CM

## 2024-01-24 DIAGNOSIS — Z00.00 MEDICARE ANNUAL WELLNESS VISIT, SUBSEQUENT: Primary | ICD-10-CM

## 2024-01-24 DIAGNOSIS — E55.9 VITAMIN D DEFICIENCY: ICD-10-CM

## 2024-01-24 DIAGNOSIS — M17.0 PRIMARY OSTEOARTHRITIS OF BOTH KNEES: ICD-10-CM

## 2024-01-24 DIAGNOSIS — N18.32 STAGE 3B CHRONIC KIDNEY DISEASE: ICD-10-CM

## 2024-01-24 NOTE — PROGRESS NOTES
The ABCs of the Annual Wellness Visit  Subsequent Medicare Wellness Visit    Subjective    Jermaine Singh is a 78 y.o. male who presents for a Subsequent Medicare Wellness Visit.    The following portions of the patient's history were reviewed and   updated as appropriate: allergies, current medications, past family history, past medical history, past social history, past surgical history, and problem list.    Compared to one year ago, the patient feels his physical   health is the same.    Compared to one year ago, the patient feels his mental   health is the same.    Recent Hospitalizations:  This patient has had a [facility] admission record on file within the last 365 days.    Current Medical Providers:  Patient Care Team:  Marysol Shrestha MD as PCP - General (Internal Medicine)  Jermaine Hernandez MD as Consulting Physician (Cardiology)    Outpatient Medications Prior to Visit   Medication Sig Dispense Refill   • acetaminophen (TYLENOL) 325 MG tablet Take 2 tablets by mouth Every 6 (Six) Hours As Needed for Mild Pain.     • amoxicillin-clavulanate (AUGMENTIN) 875-125 MG per tablet Take 1 tablet by mouth 2 (Two) Times a Day.     • apixaban (ELIQUIS) 5 MG tablet tablet Take 1 tablet by mouth Every 12 (Twelve) Hours. Indications: DVT/PE (active thrombosis) 180 tablet 0   • aspirin 81 MG chewable tablet Chew 1 tablet Daily. 90 tablet 3   • atorvastatin (LIPITOR) 20 MG tablet Take 1 tablet by mouth Every Night. 90 tablet 3   • azelastine (ASTELIN) 0.1 % nasal spray 2 sprays into the nostril(s) as directed by provider 2 (Two) Times a Day. Use in each nostril as directed 30 mL 2   • bumetanide (BUMEX) 1 MG tablet Take 1 tablet by mouth 2 (Two) Times a Day. (Patient taking differently: Take 1 tablet by mouth 2 (Two) Times a Day. Qd-bid) 60 tablet 6   • carvedilol (COREG) 3.125 MG tablet Take 1 tablet by mouth 2 (Two) Times a Day With Meals.     • Cholecalciferol (VITAMIN D3) 5000 units tablet tablet Take 1  tablet by mouth Daily.     • Diclofenac Sodium (VOLTAREN) 1 % gel gel Apply 2 g topically to the appropriate area as directed 4 (Four) Times a Day As Needed (pain).     • Elastic Bandages & Supports (Hernia Support Right Medium) misc Use daily 1 each 0   • famotidine (PEPCID) 20 MG tablet Take 1 tablet by mouth 2 (Two) Times a Day As Needed for Heartburn. 180 tablet 1   • fluocinonide (LIDEX) 0.05 % cream Apply topically to affected area BID x 1 week then once daily as needed for rash 60 g 2   • glucose blood (OneTouch Verio) test strip 1 each by Other route 2 (Two) Times a Day. Use as instructed 100 each 3   • Hydrocortisone, Perianal, (ANUSOL-HC) 2.5 % rectal cream Insert  into the rectum 2 (Two) Times a Day. 30 g 3   • insulin detemir (LEVEMIR) 100 UNIT/ML injection Inject 10 Units under the skin into the appropriate area as directed Every Night.     • Insulin Lispro (humaLOG) 100 UNIT/ML injection Inject 3 Units under the skin into the appropriate area as directed 3 (Three) Times a Day Before Meals. Under 150 3 unit  150-200 4 units  200-250- 5 unit     • levothyroxine (SYNTHROID, LEVOTHROID) 88 MCG tablet Take 1 tablet by mouth Every Morning. 90 tablet 1   • linaclotide (Linzess) 290 MCG capsule capsule Take 1 capsule by mouth Every Morning Before Breakfast. 30 capsule 2   • losartan (COZAAR) 100 MG tablet Take 1 tablet by mouth Daily.     • Misc. Devices (Rollator Ultra-Light) misc 1 each Daily. 1 each 0   • nitroglycerin (NITROSTAT) 0.4 MG SL tablet Place 1 tablet under the tongue Every 5 (Five) Minutes As Needed for Chest Pain. Take no more than 3 doses in 15 minutes. 9 tablet 12   • nystatin (MYCOSTATIN) 706537 UNIT/GM powder Apply  topically to the appropriate area as directed 2 (Two) Times a Day. 60 g 5   • Probiotic Product (Align) 4 MG capsule Take 1 each by mouth Daily.     • Vibegron (Gemtesa) 75 MG tablet Take 1 tablet by mouth Daily. 14 tablet 0   • Xiidra 5 % ophthalmic solution Administer 1 drop  "to both eyes 2 (Two) Times a Day.       No facility-administered medications prior to visit.       No opioid medication identified on active medication list. I have reviewed chart for other potential  high risk medication/s and harmful drug interactions in the elderly.        Aspirin is on active medication list. Aspirin use is indicated based on review of current medical condition/s. Pros and cons of this therapy have been discussed today. Benefits of this medication outweigh potential harm.  Patient has been encouraged to continue taking this medication.  .      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 Au   • Atherosclerosis of native artery of both lower extremities with intermittent claudication   • SSS (sick sinus syndrome)   • Chronic HFrEF (heart failure with reduced ejection fraction)   • DVT, bilateral lower limbs   • Cellulitis of right lower extremity   • Anemia, chronic disease   • PVD (peripheral vascular disease)   • Status post amputation of great toe, left   • Diabetic foot ulcer   • GERD without esophagitis   • Stage 3b chronic kidney disease   • Arthralgia of shoulder   • Right inguinal hernia   • Diabetic foot infection   • Status post amputation of great toe and second toe of left foot   • PAF (paroxysmal atrial fibrillation)   • Moderate malnutrition     Advance Care Planning   Advance Care Planning     Advance Directive is on file.  ACP discussion was held with the patient during this visit. Patient has an advance directive in EMR which is still valid.      Objective    Vitals:    01/24/24 1203   BP: 130/74   BP Location: Right arm   Patient Position: Sitting   Cuff Size: Adult   Pulse: 60   Resp: 18   SpO2: 94%   Weight: Comment: unable to weigh, pt in wheelchair   Height: 190.5 cm (75\") " "    Estimated body mass index is 21.12 kg/m² as calculated from the following:    Height as of this encounter: 190.5 cm (75\").    Weight as of 10/19/23: 76.7 kg (169 lb).    BMI is within normal parameters. No other follow-up for BMI required.      Does the patient have evidence of cognitive impairment? No          HEALTH RISK ASSESSMENT    Smoking Status:  Social History     Tobacco Use   Smoking Status Never   • Passive exposure: Past   Smokeless Tobacco Never     Alcohol Consumption:  Social History     Substance and Sexual Activity   Alcohol Use Not Currently    Comment: every 2-3 months     Fall Risk Screen:    STEADI Fall Risk Assessment was completed, and patient is at MODERATE risk for falls. Assessment completed on:2024    Depression Screenin/24/2024    12:15 PM   PHQ-2/PHQ-9 Depression Screening   Little Interest or Pleasure in Doing Things 0-->not at all   Feeling Down, Depressed or Hopeless 0-->not at all   PHQ-9: Brief Depression Severity Measure Score 0       Health Habits and Functional and Cognitive Screenin/24/2024    12:17 PM   Functional & Cognitive Status   Do you have difficulty preparing food and eating? Yes   Do you have difficulty bathing yourself, getting dressed or grooming yourself? Yes   Do you have difficulty using the toilet? Yes   Do you have difficulty moving around from place to place? Yes   Do you have trouble with steps or getting out of a bed or a chair? Yes   Current Diet Well Balanced Diet   Dental Exam Not up to date   Eye Exam Up to date   Exercise (times per week) 0 times per week   Current Exercises Include No Regular Exercise   Do you need help using the phone?  No   Are you deaf or do you have serious difficulty hearing?  No   Do you need help to go to places out of walking distance? Yes   Do you need help shopping? Yes   Do you need help preparing meals?  Yes   Do you need help with housework?  Yes   Do you need help with laundry? Yes   Do you need " help taking your medications? Yes   Do you need help managing money? No   Do you ever drive or ride in a car without wearing a seat belt? No   Have you felt unusual stress, anger or loneliness in the last month? No   Who do you live with? Spouse   If you need help, do you have trouble finding someone available to you? No   Have you been bothered in the last four weeks by sexual problems? No   Do you have difficulty concentrating, remembering or making decisions? No       Age-appropriate Screening Schedule:  Refer to the list below for future screening recommendations based on patient's age, sex and/or medical conditions. Orders for these recommended tests are listed in the plan section. The patient has been provided with a written plan.    Health Maintenance   Topic Date Due   • HEPATITIS C SCREENING  Never done   • DIABETIC EYE EXAM  12/04/2021   • TDAP/TD VACCINES (1 - Tdap) 01/24/2024 (Originally 3/27/1964)   • COVID-19 Vaccine (2 - 2023-24 season) 01/26/2024 (Originally 9/1/2023)   • URINE MICROALBUMIN  02/11/2024 (Originally 1945)   • INFLUENZA VACCINE  03/31/2024 (Originally 8/1/2023)   • Pneumococcal Vaccine 65+ (2 of 2 - PPSV23 or PCV20) 01/24/2025 (Originally 12/7/2018)   • HEMOGLOBIN A1C  02/29/2024   • LIPID PANEL  03/29/2024   • ANNUAL WELLNESS VISIT  01/24/2025   • ZOSTER VACCINE  Completed                  CMS Preventative Services Quick Reference  Risk Factors Identified During Encounter  Depression/Dysphoria: Referral to Mental Health specialist  Fall Risk-High or Moderate: Discussed Fall Prevention in the home  Immunizations Discussed/Encouraged: Influenza, Prevnar 20 (Pneumococcal 20-valent conjugate), COVID19, and RSV (Respiratory Syncytial Virus)  The above risks/problems have been discussed with the patient.  Pertinent information has been shared with the patient in the After Visit Summary.  An After Visit Summary and PPPS were made available to the patient.    Follow Up:   Next Medicare  "Wellness visit to be scheduled in 1 year.       Additional E&M Note during same encounter follows:  Patient has multiple medical problems which are significant and separately identifiable that require additional work above and beyond the Medicare Wellness Visit.      Chief Complaint  Medicare Wellness-subsequent    Subjective        HPI  Jermaine Singh is also being seen today for decreased mobility , gait issues. Accompanied by wife, who is concerned about getting him out of the house. She reports he acts as an invalid, has completely become immobile, and she is at her wits end. Looking into some respite care.  Previously when in the hospital got in trouble for inappropriate beh with nursing. She would like to have him get a Mental health eval         Objective   Vital Signs:  /74 (BP Location: Right arm, Patient Position: Sitting, Cuff Size: Adult)   Pulse 60   Resp 18   Ht 190.5 cm (75\")   SpO2 94%   BMI 21.12 kg/m²     Physical Exam  Constitutional:       General: He is not in acute distress.     Appearance: Normal appearance.   HENT:      Head: Normocephalic and atraumatic.      Right Ear: Tympanic membrane and external ear normal.      Left Ear: Tympanic membrane and external ear normal.      Nose: Nose normal.      Mouth/Throat:      Mouth: Mucous membranes are moist.   Eyes:      General: No scleral icterus.  Neck:      Vascular: No carotid bruit.   Cardiovascular:      Rate and Rhythm: Normal rate and regular rhythm.      Pulses: Normal pulses.      Heart sounds: Normal heart sounds. No murmur heard.     No friction rub. No gallop.   Pulmonary:      Effort: Pulmonary effort is normal.      Breath sounds: Normal breath sounds. No rhonchi or rales.   Abdominal:      General: Bowel sounds are normal. There is no distension.      Palpations: Abdomen is soft.      Tenderness: There is no right CVA tenderness, left CVA tenderness, guarding or rebound.      Hernia: No hernia is present. "   Musculoskeletal:         General: No tenderness. Normal range of motion.      Cervical back: Normal range of motion.      Right lower leg: No edema.      Left lower leg: No edema.   Lymphadenopathy:      Cervical: No cervical adenopathy.   Skin:     General: Skin is warm.      Findings: No rash.   Neurological:      General: No focal deficit present.      Mental Status: He is alert and oriented to person, place, and time. Mental status is at baseline.      Cranial Nerves: No cranial nerve deficit.      Sensory: No sensory deficit.      Coordination: Coordination normal.      Gait: Gait normal.      Deep Tendon Reflexes: Reflexes normal.   Psychiatric:         Mood and Affect: Mood normal.         Behavior: Behavior normal.                           Current Outpatient Medications:   •  acetaminophen (TYLENOL) 325 MG tablet, Take 2 tablets by mouth Every 6 (Six) Hours As Needed for Mild Pain., Disp: , Rfl:   •  amoxicillin-clavulanate (AUGMENTIN) 875-125 MG per tablet, Take 1 tablet by mouth 2 (Two) Times a Day., Disp: , Rfl:   •  apixaban (ELIQUIS) 5 MG tablet tablet, Take 1 tablet by mouth Every 12 (Twelve) Hours. Indications: DVT/PE (active thrombosis), Disp: 180 tablet, Rfl: 0  •  aspirin 81 MG chewable tablet, Chew 1 tablet Daily., Disp: 90 tablet, Rfl: 3  •  atorvastatin (LIPITOR) 20 MG tablet, Take 1 tablet by mouth Every Night., Disp: 90 tablet, Rfl: 3  •  azelastine (ASTELIN) 0.1 % nasal spray, 2 sprays into the nostril(s) as directed by provider 2 (Two) Times a Day. Use in each nostril as directed, Disp: 30 mL, Rfl: 2  •  bumetanide (BUMEX) 1 MG tablet, Take 1 tablet by mouth 2 (Two) Times a Day. (Patient taking differently: Take 1 tablet by mouth 2 (Two) Times a Day. Qd-bid), Disp: 60 tablet, Rfl: 6  •  carvedilol (COREG) 3.125 MG tablet, Take 1 tablet by mouth 2 (Two) Times a Day With Meals., Disp: , Rfl:   •  Cholecalciferol (VITAMIN D3) 5000 units tablet tablet, Take 1 tablet by mouth Daily., Disp: ,  Rfl:   •  Diclofenac Sodium (VOLTAREN) 1 % gel gel, Apply 2 g topically to the appropriate area as directed 4 (Four) Times a Day As Needed (pain)., Disp: , Rfl:   •  Elastic Bandages & Supports (Hernia Support Right Medium) misc, Use daily, Disp: 1 each, Rfl: 0  •  famotidine (PEPCID) 20 MG tablet, Take 1 tablet by mouth 2 (Two) Times a Day As Needed for Heartburn., Disp: 180 tablet, Rfl: 1  •  fluocinonide (LIDEX) 0.05 % cream, Apply topically to affected area BID x 1 week then once daily as needed for rash, Disp: 60 g, Rfl: 2  •  glucose blood (OneTouch Verio) test strip, 1 each by Other route 2 (Two) Times a Day. Use as instructed, Disp: 100 each, Rfl: 3  •  Hydrocortisone, Perianal, (ANUSOL-HC) 2.5 % rectal cream, Insert  into the rectum 2 (Two) Times a Day., Disp: 30 g, Rfl: 3  •  insulin detemir (LEVEMIR) 100 UNIT/ML injection, Inject 10 Units under the skin into the appropriate area as directed Every Night., Disp: , Rfl:   •  Insulin Lispro (humaLOG) 100 UNIT/ML injection, Inject 3 Units under the skin into the appropriate area as directed 3 (Three) Times a Day Before Meals. Under 150 3 unit 150-200 4 units 200-250- 5 unit, Disp: , Rfl:   •  levothyroxine (SYNTHROID, LEVOTHROID) 88 MCG tablet, Take 1 tablet by mouth Every Morning., Disp: 90 tablet, Rfl: 1  •  linaclotide (Linzess) 290 MCG capsule capsule, Take 1 capsule by mouth Every Morning Before Breakfast., Disp: 30 capsule, Rfl: 2  •  losartan (COZAAR) 100 MG tablet, Take 1 tablet by mouth Daily., Disp: , Rfl:   •  Misc. Devices (Rollator Ultra-Light) misc, 1 each Daily., Disp: 1 each, Rfl: 0  •  nitroglycerin (NITROSTAT) 0.4 MG SL tablet, Place 1 tablet under the tongue Every 5 (Five) Minutes As Needed for Chest Pain. Take no more than 3 doses in 15 minutes., Disp: 9 tablet, Rfl: 12  •  nystatin (MYCOSTATIN) 655172 UNIT/GM powder, Apply  topically to the appropriate area as directed 2 (Two) Times a Day., Disp: 60 g, Rfl: 5  •  Probiotic Product (Align)  "4 MG capsule, Take 1 each by mouth Daily., Disp: , Rfl:   •  Vibegron (Gemtesa) 75 MG tablet, Take 1 tablet by mouth Daily., Disp: 14 tablet, Rfl: 0  •  Xiidra 5 % ophthalmic solution, Administer 1 drop to both eyes 2 (Two) Times a Day., Disp: , Rfl:       The following portions of the patient's history were reviewed and updated as appropriate: allergies, current medications, past family history, past medical history, past social history, past surgical history and problem list.        Objective   /74 (BP Location: Right arm, Patient Position: Sitting, Cuff Size: Adult)   Pulse 60   Resp 18   Ht 190.5 cm (75\")   SpO2 94%   BMI 21.12 kg/m²         Results for orders placed or performed in visit on 12/14/23   CK    Specimen: Blood   Result Value Ref Range    Creatine Kinase 107 20 - 200 U/L   Comprehensive Metabolic Panel    Specimen: Blood   Result Value Ref Range    Glucose 190 (H) 65 - 99 mg/dL    BUN 59 (H) 8 - 23 mg/dL    Creatinine 1.90 (H) 0.76 - 1.27 mg/dL    Sodium 141 136 - 145 mmol/L    Potassium 5.4 (H) 3.5 - 5.2 mmol/L    Chloride 109 (H) 98 - 107 mmol/L    CO2 20.0 (L) 22.0 - 29.0 mmol/L    Calcium 8.9 8.6 - 10.5 mg/dL    Total Protein 6.9 6.0 - 8.5 g/dL    Albumin 3.9 3.5 - 5.2 g/dL    ALT (SGPT) 14 1 - 41 U/L    AST (SGOT) 11 1 - 40 U/L    Alkaline Phosphatase 92 39 - 117 U/L    Total Bilirubin 0.4 0.0 - 1.2 mg/dL    Globulin 3.0 gm/dL    A/G Ratio 1.3 g/dL    BUN/Creatinine Ratio 31.1 (H) 7.0 - 25.0    Anion Gap 12.0 5.0 - 15.0 mmol/L    eGFR 35.7 (L) >60.0 mL/min/1.73   C-reactive Protein    Specimen: Blood   Result Value Ref Range    C-Reactive Protein <0.30 0.00 - 0.50 mg/dL   Sedimentation Rate    Specimen: Blood   Result Value Ref Range    Sed Rate 26 (H) 0 - 20 mm/hr   CBC Auto Differential    Specimen: Blood   Result Value Ref Range    WBC 7.52 3.40 - 10.80 10*3/mm3    RBC 4.27 4.14 - 5.80 10*6/mm3    Hemoglobin 12.8 (L) 13.0 - 17.7 g/dL    Hematocrit 39.0 37.5 - 51.0 %    MCV 91.3 " 79.0 - 97.0 fL    MCH 30.0 26.6 - 33.0 pg    MCHC 32.8 31.5 - 35.7 g/dL    RDW 14.0 12.3 - 15.4 %    RDW-SD 46.5 37.0 - 54.0 fl    MPV 11.9 6.0 - 12.0 fL    Platelets 174 140 - 450 10*3/mm3    Neutrophil % 72.5 42.7 - 76.0 %    Lymphocyte % 12.8 (L) 19.6 - 45.3 %    Monocyte % 7.6 5.0 - 12.0 %    Eosinophil % 6.0 0.3 - 6.2 %    Basophil % 0.8 0.0 - 1.5 %    Immature Grans % 0.3 0.0 - 0.5 %    Neutrophils, Absolute 5.46 1.70 - 7.00 10*3/mm3    Lymphocytes, Absolute 0.96 0.70 - 3.10 10*3/mm3    Monocytes, Absolute 0.57 0.10 - 0.90 10*3/mm3    Eosinophils, Absolute 0.45 (H) 0.00 - 0.40 10*3/mm3    Basophils, Absolute 0.06 0.00 - 0.20 10*3/mm3    Immature Grans, Absolute 0.02 0.00 - 0.05 10*3/mm3    nRBC 0.0 0.0 - 0.2 /100 WBC             Assessment & Plan   Diagnoses and all orders for this visit:    Medicare annual wellness visit, subsequent    Difficulty walking  -     Ambulatory Referral to Home Health  -     Vitamin B12; Future    Primary osteoarthritis of both knees  -     Ambulatory Referral to Home Health    Mixed hyperlipidemia  -     Ambulatory Referral to Chronic Care Management Care Coordination, Caregiving/Support  -     Comprehensive Metabolic Panel; Future  -     Lipid Panel; Future    Stage 3b chronic kidney disease  -     Ambulatory Referral to Chronic Care Management Care Coordination, Caregiving/Support    Behavior disturbance  -     Ambulatory Referral to Behavioral Health    Hypothyroidism (acquired)  -     TSH Rfx On Abnormal To Free T4; Future    Vitamin D deficiency  -     Vitamin D,25-Hydroxy; Future    Encounter for screening for viral disease  -     Hepatitis C Antibody; Future               PHQ-2/PHQ-9 Depression screening reviewed with patient . Total time spent today for depression screening  with Jermaine Singh  was 15 minutes in counseling, along with plans for any diagnositc work-up and treatment.    Advice and education were given regarding cardiovascular risk reduction, healthy dietary  habits, Seatbelt and helmet use and self skin examination.          Electronically signed by:    Marysol Shrestha MD        [facility] v [facility].    Also for the rt foot insert, to discuss with HH PT.         Follow Up   Return in about 6 months (around 7/24/2024) for Next scheduled follow up.  Patient was given instructions and counseling regarding his condition or for health maintenance advice. Please see specific information pulled into the AVS if appropriate.

## 2024-01-25 ENCOUNTER — PATIENT OUTREACH (OUTPATIENT)
Dept: CASE MANAGEMENT | Facility: OTHER | Age: 79
End: 2024-01-25
Payer: MEDICARE

## 2024-01-25 ENCOUNTER — REFERRAL TRIAGE (OUTPATIENT)
Dept: CASE MANAGEMENT | Facility: OTHER | Age: 79
End: 2024-01-25
Payer: MEDICARE

## 2024-01-25 DIAGNOSIS — E44.0 MODERATE MALNUTRITION: ICD-10-CM

## 2024-01-25 DIAGNOSIS — E11.59 TYPE 2 DIABETES MELLITUS WITH OTHER CIRCULATORY COMPLICATION, WITH LONG-TERM CURRENT USE OF INSULIN: Primary | Chronic | ICD-10-CM

## 2024-01-25 DIAGNOSIS — Z79.4 TYPE 2 DIABETES MELLITUS WITH OTHER CIRCULATORY COMPLICATION, WITH LONG-TERM CURRENT USE OF INSULIN: Primary | Chronic | ICD-10-CM

## 2024-01-25 DIAGNOSIS — I50.22 CHRONIC HFREF (HEART FAILURE WITH REDUCED EJECTION FRACTION): Chronic | ICD-10-CM

## 2024-01-25 NOTE — OUTREACH NOTE
AMBULATORY CASE MANAGEMENT NOTE    Name and Relationship of Patient/Support Person: HIPOLITO DAWN - Emergency Contact    Patient Outreach    Spoke with patient's wife as a follow up to a provider referral. Discussed extra support for wife as she cares for her . Wife reports her  can only walk short distances and primarily sits in the recliner, he is unable to navigate stairs. Wife would like resources to help her care for her  as well as respite care. Emailed her the contact information for the [facility], Aid and Attendance Benefits, and encouraged her to contact her local EMS for non-emergency transportation to appointments.     A referral for HH SN, PT/OT, SW was placed yesterday. Encouraged patient to try to find time to care for herself as well. Provided my contact information and encouraged patient to call if she had more questions. Wife is agreeable for a follow up call next week.     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]   Hearing Difficulty or Deaf no   Wear Glasses or Blind no   Concentrating, Remembering or Making Decisions Difficulty no   Difficulty Communicating no   Difficulty Eating/Swallowing no   Walking or Climbing Stairs Difficulty yes   Walking or Climbing Stairs ambulation difficulty, requires equipment   Dressing/Bathing Difficulty yes   Dressing/Bathing bathing difficulty, requires equipment, bathing difficulty, assistance 1 person   Dressing/Bathing Management Wife assists   Doing Errands Independently Difficulty (such as shopping) yes   Errands Management Wife   Equipment Currently Used at Home rollator, walker, rolling, wheelchair, commode, shower chair   Change in Functional Status Since Onset of Current  Illness/Injury yes  [Unable to go up/down stairs, only walks short distances. Home health ordered.]   Fall Risk Category High   Primary Source of Support/Comfort spouse   People in Home spouse   Family Caregiver if Needed spouse   Family Caregiver Bang Singh   Primary Roles/Responsibilities retired   Current Living Arrangements home   Resource/Environmental Concerns reliable transportation   Transportation Concerns other (see comments)  [Physical difficulties for wife to get patient in/out of home and van]        SDOH updated and reviewed with the patient during this program:  Financial Resource Strain: Medium Risk (1/25/2024)    Overall Financial Resource Strain (CARDIA)     Difficulty of Paying Living Expenses: Somewhat hard      --     Food Insecurity: No Food Insecurity (1/25/2024)    Hunger Vital Sign     Worried About Running Out of Food in the Last Year: Never true     Ran Out of Food in the Last Year: Never true      --     Transportation Needs: No Transportation Needs (1/25/2024)    PRAPARE - Transportation     Lack of Transportation (Medical): No     Lack of Transportation (Non-Medical): No       Anne-Marie QUINTANA  Ambulatory Case Management    1/25/2024, 13:09 EST

## 2024-01-26 ENCOUNTER — HOME HEALTH ADMISSION (OUTPATIENT)
Dept: HOME HEALTH SERVICES | Facility: HOME HEALTHCARE | Age: 79
End: 2024-01-26
Payer: MEDICARE

## 2024-02-01 ENCOUNTER — PATIENT OUTREACH (OUTPATIENT)
Dept: CASE MANAGEMENT | Facility: OTHER | Age: 79
End: 2024-02-01
Payer: MEDICARE

## 2024-02-01 DIAGNOSIS — E44.0 MODERATE MALNUTRITION: ICD-10-CM

## 2024-02-01 DIAGNOSIS — E11.59 TYPE 2 DIABETES MELLITUS WITH OTHER CIRCULATORY COMPLICATION, WITH LONG-TERM CURRENT USE OF INSULIN: Primary | ICD-10-CM

## 2024-02-01 DIAGNOSIS — Z79.4 TYPE 2 DIABETES MELLITUS WITH OTHER CIRCULATORY COMPLICATION, WITH LONG-TERM CURRENT USE OF INSULIN: Primary | ICD-10-CM

## 2024-02-01 DIAGNOSIS — I50.22 CHRONIC HFREF (HEART FAILURE WITH REDUCED EJECTION FRACTION): ICD-10-CM

## 2024-02-01 NOTE — OUTREACH NOTE
AMBULATORY CASE MANAGEMENT NOTE    Name and Relationship of Patient/Support Person: NERISSA DAWN - Emergency Contact    Care Coordination    Spoke with patient's wife. She reports HH PT/OT has visited. Wife reports patient does not have motivation to walk, she has difficulty getting him out of the house. [facility] follow up regarding referral to Behavioral Health Don, requested revised referral from PCP to Behavioral Health to schedule virtual visit.     Encouraged Nerissa to follow up with [facility]. She had received a call [facility] and was told patient only needed to see a [facility] provider yearly. Encouraged wife to schedule an appointment with a [facility] provider when possible. HH SW to visit this week for additional caregiver resources. Wife voiced understanding and was appreciative of the call.     Anne-Marie QUINTANA  Ambulatory Case Management    2/1/2024, 11:31 EST

## 2024-02-05 ENCOUNTER — TELEPHONE (OUTPATIENT)
Dept: CARDIAC SURGERY | Facility: CLINIC | Age: 79
End: 2024-02-05
Payer: MEDICARE

## 2024-02-05 NOTE — TELEPHONE ENCOUNTER
Caller: HIPOLITO DAWN    Relationship: Emergency Contact    Best call back number:     [phone]       What form or medical record are you requesting: LAST TWO YEARS OFFICE NOTES AND AMPUTATION SURGERY DATES WITH DR. OCHOA     Who is requesting this form or medical record from you:  [facility]     How would you like to receive the form or medical records (pick-up, mail, fax): READ OVER THE  PHONE  SO THAT THE PATIENT SPOUSE COULD WRITE DOWN THE DATES.     Timeframe paperwork needed: ASAP

## 2024-02-06 ENCOUNTER — TELEPHONE (OUTPATIENT)
Dept: CARDIAC SURGERY | Facility: CLINIC | Age: 79
End: 2024-02-06
Payer: MEDICARE

## 2024-02-06 NOTE — TELEPHONE ENCOUNTER
Patient called requesting prescription for shoe inserts. He would like prescription sent to [facility] (phone: [phone])  Ok to send?

## 2024-02-06 NOTE — TELEPHONE ENCOUNTER
S/w Nerissa (pt wife) and let her know that I am unable to give her information over the phone, that we would have to have an updated Verbal release form or a release of information form from the [facility] or a filled out SHARMIN form from our office.

## 2024-02-13 ENCOUNTER — OUTSIDE FACILITY SERVICE (OUTPATIENT)
Dept: INTERNAL MEDICINE | Facility: CLINIC | Age: 79
End: 2024-02-13
Payer: MEDICARE

## 2024-02-26 ENCOUNTER — TELEPHONE (OUTPATIENT)
Dept: INTERNAL MEDICINE | Facility: CLINIC | Age: 79
End: 2024-02-26
Payer: MEDICARE

## 2024-02-26 NOTE — TELEPHONE ENCOUNTER
PT HAS WOUND ON RIGHT LOWER LEG AND EDITH IS AT HIS HOME DOING PT WITH HIM AND WOULD LIKE TO SEND A NURSE OUT TO LOOK AT IT.     HISTORY OF CELLULITIS.  PT UNSURE OF HOW HE GOT THE WOUND    PLEASE CALL [facility] [phone] WITH VERBAL ORDER.

## 2024-03-05 ENCOUNTER — OFFICE VISIT (OUTPATIENT)
Dept: INTERNAL MEDICINE | Facility: CLINIC | Age: 79
End: 2024-03-05
Payer: MEDICARE

## 2024-03-05 ENCOUNTER — LAB (OUTPATIENT)
Dept: LAB | Facility: HOSPITAL | Age: 79
End: 2024-03-05
Payer: MEDICARE

## 2024-03-05 VITALS
TEMPERATURE: 99.1 F | HEART RATE: 63 BPM | OXYGEN SATURATION: 92 % | WEIGHT: 170 LBS | SYSTOLIC BLOOD PRESSURE: 106 MMHG | DIASTOLIC BLOOD PRESSURE: 68 MMHG | RESPIRATION RATE: 12 BRPM | BODY MASS INDEX: 21.25 KG/M2

## 2024-03-05 DIAGNOSIS — E55.9 VITAMIN D DEFICIENCY: ICD-10-CM

## 2024-03-05 DIAGNOSIS — R26.2 DIFFICULTY WALKING: ICD-10-CM

## 2024-03-05 DIAGNOSIS — L29.9 ITCHING: ICD-10-CM

## 2024-03-05 DIAGNOSIS — N30.01 ACUTE CYSTITIS WITH HEMATURIA: Primary | ICD-10-CM

## 2024-03-05 DIAGNOSIS — E03.9 HYPOTHYROIDISM (ACQUIRED): Chronic | ICD-10-CM

## 2024-03-05 DIAGNOSIS — N32.89 BLADDER WALL THICKENING: ICD-10-CM

## 2024-03-05 DIAGNOSIS — Z11.59 ENCOUNTER FOR SCREENING FOR VIRAL DISEASE: ICD-10-CM

## 2024-03-05 DIAGNOSIS — E78.2 MIXED HYPERLIPIDEMIA: ICD-10-CM

## 2024-03-05 LAB
25(OH)D3 SERPL-MCNC: 67.2 NG/ML (ref 30–100)
ALBUMIN SERPL-MCNC: 3.9 G/DL (ref 3.5–5.2)
ALBUMIN/GLOB SERPL: 1.5 G/DL
ALP SERPL-CCNC: 82 U/L (ref 39–117)
ALT SERPL W P-5'-P-CCNC: 24 U/L (ref 1–41)
ANION GAP SERPL CALCULATED.3IONS-SCNC: 12.2 MMOL/L (ref 5–15)
AST SERPL-CCNC: 13 U/L (ref 1–40)
BILIRUB BLD-MCNC: NEGATIVE MG/DL
BILIRUB SERPL-MCNC: 0.3 MG/DL (ref 0–1.2)
BUN SERPL-MCNC: 88 MG/DL (ref 8–23)
BUN/CREAT SERPL: 41.7 (ref 7–25)
CALCIUM SPEC-SCNC: 8.6 MG/DL (ref 8.6–10.5)
CHLORIDE SERPL-SCNC: 105 MMOL/L (ref 98–107)
CHOLEST SERPL-MCNC: 123 MG/DL (ref 0–200)
CLARITY, POC: ABNORMAL
CO2 SERPL-SCNC: 17.8 MMOL/L (ref 22–29)
COLOR UR: ABNORMAL
CREAT SERPL-MCNC: 2.11 MG/DL (ref 0.76–1.27)
EGFRCR SERPLBLD CKD-EPI 2021: 31.4 ML/MIN/1.73
EXPIRATION DATE: ABNORMAL
GLOBULIN UR ELPH-MCNC: 2.6 GM/DL
GLUCOSE SERPL-MCNC: 170 MG/DL (ref 65–99)
GLUCOSE UR STRIP-MCNC: NEGATIVE MG/DL
HCV AB SER DONR QL: NORMAL
HDLC SERPL-MCNC: 47 MG/DL (ref 40–60)
KETONES UR QL: NEGATIVE
LDLC SERPL CALC-MCNC: 59 MG/DL (ref 0–100)
LDLC/HDLC SERPL: 1.26 {RATIO}
LEUKOCYTE EST, POC: ABNORMAL
Lab: ABNORMAL
NITRITE UR-MCNC: NEGATIVE MG/ML
PH UR: 6 [PH] (ref 5–8)
POTASSIUM SERPL-SCNC: 5.7 MMOL/L (ref 3.5–5.2)
PROT SERPL-MCNC: 6.5 G/DL (ref 6–8.5)
PROT UR STRIP-MCNC: ABNORMAL MG/DL
RBC # UR STRIP: ABNORMAL /UL
SODIUM SERPL-SCNC: 135 MMOL/L (ref 136–145)
SP GR UR: 1.02 (ref 1–1.03)
TRIGL SERPL-MCNC: 85 MG/DL (ref 0–150)
TSH SERPL DL<=0.05 MIU/L-ACNC: 3.39 UIU/ML (ref 0.27–4.2)
UROBILINOGEN UR QL: ABNORMAL
VIT B12 BLD-MCNC: 804 PG/ML (ref 211–946)
VLDLC SERPL-MCNC: 17 MG/DL (ref 5–40)

## 2024-03-05 PROCEDURE — 81003 URINALYSIS AUTO W/O SCOPE: CPT | Performed by: INTERNAL MEDICINE

## 2024-03-05 PROCEDURE — 82306 VITAMIN D 25 HYDROXY: CPT

## 2024-03-05 PROCEDURE — 1160F RVW MEDS BY RX/DR IN RCRD: CPT | Performed by: INTERNAL MEDICINE

## 2024-03-05 PROCEDURE — 84443 ASSAY THYROID STIM HORMONE: CPT

## 2024-03-05 PROCEDURE — 99214 OFFICE O/P EST MOD 30 MIN: CPT | Performed by: INTERNAL MEDICINE

## 2024-03-05 PROCEDURE — 3078F DIAST BP <80 MM HG: CPT | Performed by: INTERNAL MEDICINE

## 2024-03-05 PROCEDURE — 80061 LIPID PANEL: CPT

## 2024-03-05 PROCEDURE — 82607 VITAMIN B-12: CPT

## 2024-03-05 PROCEDURE — 80053 COMPREHEN METABOLIC PANEL: CPT

## 2024-03-05 PROCEDURE — 3074F SYST BP LT 130 MM HG: CPT | Performed by: INTERNAL MEDICINE

## 2024-03-05 PROCEDURE — 86803 HEPATITIS C AB TEST: CPT

## 2024-03-05 PROCEDURE — 87086 URINE CULTURE/COLONY COUNT: CPT | Performed by: INTERNAL MEDICINE

## 2024-03-05 PROCEDURE — 1159F MED LIST DOCD IN RCRD: CPT | Performed by: INTERNAL MEDICINE

## 2024-03-05 RX ORDER — FAMOTIDINE 20 MG/1
1 TABLET, FILM COATED ORAL 2 TIMES DAILY PRN
COMMUNITY
End: 2024-03-05 | Stop reason: SDUPTHER

## 2024-03-05 RX ORDER — SULFAMETHOXAZOLE AND TRIMETHOPRIM 800; 160 MG/1; MG/1
1 TABLET ORAL 2 TIMES DAILY
Qty: 14 TABLET | Refills: 0 | Status: SHIPPED | OUTPATIENT
Start: 2024-03-05 | End: 2024-03-07 | Stop reason: ALTCHOICE

## 2024-03-06 NOTE — PROGRESS NOTES
Chief Complaint   Patient presents with    Burning with Urination     PCP - Dr. Shrestha      78 y.o.  male, accompanied by his wife Nerissa, presents for further evaluation of burning with urination that started last night, assoc'd with bilat flank pain. Denies blood in urine; denies fevers, denies frequent UTIs. Has appt with Dr. Soto later this month. Notes something was irreg with his bladder, but he does not know what is going on.    Last night, reports eating fish and then a few hours late, developed itching across his legs/thighs and then spreading to his back. Did not have rash or hives. Wife gave him benadryl. No further itching today.    The only other recent med change is change of  cream for cellulitis to silvadene cream.    Review of Systems:  ROS (+) for dysuria since last night; denies incontinence, hematuria, fevers/chills. ROS (+) for overnight itching, now resolved. ROS (+) for urinary frequency, attributed to bumex.      All other ROS reviewed and negative.      Current Outpatient Medications:     acetaminophen (TYLENOL) 325 MG prn    apixaban (ELIQUIS) 5 MG q12    aspirin 81 MG QD    atorvastatin (LIPITOR) 20 MG  QD    azelastine (ASTELIN) 0.1 % nasal spray AD    bumetanide (BUMEX) 1 MG BID    carvedilol (COREG) 3.125 MG BID    Cholecalciferol (VITAMIN D3) 5000 units QD    Diclofenac Sodium (VOLTAREN) 1 % gel AD    fluocinonide (LIDEX) 0.05 % cream AD    Hydrocortisone, Perianal, (ANUSOL-HC) 2.5 % rectal cream BID prn    insulin detemir (LEVEMIR) 100 UNIT/ML injection, Inject 10 Units QD    Insulin Lispro (humaLOG) 100 UNIT/ML injection, Inject 3 Unit TID    levothyroxine 88 MCG  QD    linaclotide (Linzess) 290 MCG QD    losartan 100 MG QD    nitroglycerin 0.4 MG SL prn    nystatin (MYCOSTATIN) 892944 UNIT/GM powder AD    Probiotic Product (Align) 4 MG QD    Vibegron (Gemtesa) 75 MG QD    Xiidra 5 % ophthalmic solution OU BID    famotidine (PEPCID) 20 MG BID    mupirocin (BACTROBAN) 2 %  nasal ointment AD      VITALS:  /68   Pulse 63   Temp 99.1 °F (37.3 °C) (Temporal)   Resp 12   Wt 77.1 kg (170 lb)   SpO2 92%   BMI 21.25 kg/m²     Physical Exam  Vitals and nursing note reviewed.   Constitutional:       General: He is not in acute distress.     Appearance: Normal appearance. He is not ill-appearing.   Eyes:      Extraocular Movements: Extraocular movements intact.      Conjunctiva/sclera: Conjunctivae normal.   Cardiovascular:      Rate and Rhythm: Normal rate and regular rhythm.      Heart sounds: Normal heart sounds.   Pulmonary:      Effort: Pulmonary effort is normal. No respiratory distress.      Breath sounds: Normal breath sounds. No wheezing or rales.   Abdominal:      General: There is no distension.      Palpations: Abdomen is soft.      Tenderness: There is no abdominal tenderness. There is no right CVA tenderness, left CVA tenderness or guarding.   Musculoskeletal:      Comments: Sitting in wheelchair   Neurological:      Mental Status: He is alert. Mental status is at baseline.   Psychiatric:         Mood and Affect: Mood normal.         Behavior: Behavior normal.         LABS  Results for orders placed or performed in visit on 03/05/24   POC Urinalysis Dipstick, Automated    Specimen: Urine   Result Value Ref Range    Color Other (A) Yellow, Straw, Dark Yellow, Kailey    Clarity, UA Turbid (A) Clear    Specific Gravity  1.020 1.005 - 1.030    pH, Urine 6.0 5.0 - 8.0    Leukocytes Large (3+) (A) Negative    Nitrite, UA Negative Negative    Protein, POC 3+ (A) Negative mg/dL    Glucose, UA Negative Negative mg/dL    Ketones, UA Negative Negative    Urobilinogen, UA 0.2 E.U./dL Normal, 0.2 E.U./dL    Bilirubin Negative Negative    Blood, UA 3+ (A) Negative    Lot Number [id number]     Expiration Date 01/14/2025 11/17/23 CT abd/pelvis - irreg/eccentric bladder wall thickening; mild-mod degen changes, diverticulosis, R inguinal hernia w/ nonobstructed loops of  bowel    ASSESSMENT/PLAN    Diagnoses and all orders for this visit:    1. Acute cystitis with hematuria (Primary)  Comments:  drink lots of H2O and RX bactrim DS BID x 7d; ucx pending; already has f/u w/ Dr. Soto with planned cystoscopy  Orders:  -     POC Urinalysis Dipstick, Automated  -     Urine Culture - Urine, Urine, Clean Catch; Future  -     Urine Culture - Urine, Urine, Clean Catch  -     sulfamethoxazole-trimethoprim (BACTRIM DS,SEPTRA DS) 800-160 MG per tablet; Take 1 tablet by mouth 2 (Two) Times a Day for 7 days.  Dispense: 14 tablet; Refill: 0    2. Bladder wall thickening  Comments:  reviewed last CT abd findings with patient; irreg bladder wall thickening w/ plans for cystoscopy per Dr. Soto    3. Itching  Comments:  overnight itching without rash; questionable d/t to fish; currently resolved        FOLLOW-UP  Discussed importance of keeping f/u appt with Dr. Soto  RTC prn; next appt with Dr. Shrestha scheduled 7/24/24    Electronically signed by:    Melissa Persaud MD, FACP  03/05/2024

## 2024-03-07 DIAGNOSIS — B37.49 YEAST UTI: Primary | ICD-10-CM

## 2024-03-07 LAB — BACTERIA SPEC AEROBE CULT: ABNORMAL

## 2024-03-07 RX ORDER — FLUCONAZOLE 100 MG/1
TABLET ORAL
Qty: 6 TABLET | Refills: 0 | Status: SHIPPED | OUTPATIENT
Start: 2024-03-07

## 2024-03-09 ENCOUNTER — APPOINTMENT (OUTPATIENT)
Dept: GENERAL RADIOLOGY | Facility: HOSPITAL | Age: 79
End: 2024-03-09
Payer: MEDICARE

## 2024-03-09 ENCOUNTER — HOSPITAL ENCOUNTER (EMERGENCY)
Facility: HOSPITAL | Age: 79
Discharge: HOME | End: 2024-03-09
Attending: EMERGENCY MEDICINE
Payer: MEDICARE

## 2024-03-09 VITALS
HEIGHT: 75 IN | RESPIRATION RATE: 18 BRPM | BODY MASS INDEX: 21.13 KG/M2 | OXYGEN SATURATION: 97 % | WEIGHT: 169.97 LBS | TEMPERATURE: 97.9 F | DIASTOLIC BLOOD PRESSURE: 58 MMHG | SYSTOLIC BLOOD PRESSURE: 101 MMHG | HEART RATE: 60 BPM

## 2024-03-09 DIAGNOSIS — R07.9 CHEST PAIN, UNSPECIFIED TYPE: Primary | ICD-10-CM

## 2024-03-09 LAB
ALBUMIN SERPL-MCNC: 3.7 G/DL (ref 3.5–5.2)
ALBUMIN/GLOB SERPL: 1.4 G/DL
ALP SERPL-CCNC: 77 U/L (ref 39–117)
ALT SERPL W P-5'-P-CCNC: 22 U/L (ref 1–41)
ANION GAP SERPL CALCULATED.3IONS-SCNC: 12 MMOL/L (ref 5–15)
AST SERPL-CCNC: 16 U/L (ref 1–40)
BASOPHILS # BLD AUTO: 0.03 10*3/MM3 (ref 0–0.2)
BASOPHILS NFR BLD AUTO: 0.4 % (ref 0–1.5)
BILIRUB SERPL-MCNC: 0.3 MG/DL (ref 0–1.2)
BUN SERPL-MCNC: 81 MG/DL (ref 8–23)
BUN/CREAT SERPL: 34.8 (ref 7–25)
CALCIUM SPEC-SCNC: 8.8 MG/DL (ref 8.6–10.5)
CHLORIDE SERPL-SCNC: 106 MMOL/L (ref 98–107)
CO2 SERPL-SCNC: 17 MMOL/L (ref 22–29)
CREAT SERPL-MCNC: 2.33 MG/DL (ref 0.76–1.27)
DEPRECATED RDW RBC AUTO: 45.1 FL (ref 37–54)
EGFRCR SERPLBLD CKD-EPI 2021: 27.9 ML/MIN/1.73
EOSINOPHIL # BLD AUTO: 0.37 10*3/MM3 (ref 0–0.4)
EOSINOPHIL NFR BLD AUTO: 4.8 % (ref 0.3–6.2)
ERYTHROCYTE [DISTWIDTH] IN BLOOD BY AUTOMATED COUNT: 13.4 % (ref 12.3–15.4)
GEN 5 2HR TROPONIN T REFLEX: 168 NG/L
GLOBULIN UR ELPH-MCNC: 2.6 GM/DL
GLUCOSE SERPL-MCNC: 173 MG/DL (ref 65–99)
HCT VFR BLD AUTO: 30.6 % (ref 37.5–51)
HGB BLD-MCNC: 10.1 G/DL (ref 13–17.7)
HOLD SPECIMEN: NORMAL
IMM GRANULOCYTES # BLD AUTO: 0.02 10*3/MM3 (ref 0–0.05)
IMM GRANULOCYTES NFR BLD AUTO: 0.3 % (ref 0–0.5)
LIPASE SERPL-CCNC: 20 U/L (ref 13–60)
LYMPHOCYTES # BLD AUTO: 1.02 10*3/MM3 (ref 0.7–3.1)
LYMPHOCYTES NFR BLD AUTO: 13.2 % (ref 19.6–45.3)
MCH RBC QN AUTO: 30.2 PG (ref 26.6–33)
MCHC RBC AUTO-ENTMCNC: 33 G/DL (ref 31.5–35.7)
MCV RBC AUTO: 91.6 FL (ref 79–97)
MONOCYTES # BLD AUTO: 0.63 10*3/MM3 (ref 0.1–0.9)
MONOCYTES NFR BLD AUTO: 8.2 % (ref 5–12)
NEUTROPHILS NFR BLD AUTO: 5.63 10*3/MM3 (ref 1.7–7)
NEUTROPHILS NFR BLD AUTO: 73.1 % (ref 42.7–76)
NRBC BLD AUTO-RTO: 0 /100 WBC (ref 0–0.2)
NT-PROBNP SERPL-MCNC: ABNORMAL PG/ML (ref 0–1800)
PLAT MORPH BLD: NORMAL
PLATELET # BLD AUTO: 150 10*3/MM3 (ref 140–450)
PMV BLD AUTO: 12.3 FL (ref 6–12)
POTASSIUM SERPL-SCNC: 5.6 MMOL/L (ref 3.5–5.2)
PROT SERPL-MCNC: 6.3 G/DL (ref 6–8.5)
RBC # BLD AUTO: 3.34 10*6/MM3 (ref 4.14–5.8)
RBC MORPH BLD: NORMAL
SODIUM SERPL-SCNC: 135 MMOL/L (ref 136–145)
TROPONIN T DELTA: -14 NG/L
TROPONIN T SERPL HS-MCNC: 182 NG/L
WBC MORPH BLD: NORMAL
WBC NRBC COR # BLD AUTO: 7.7 10*3/MM3 (ref 3.4–10.8)
WHOLE BLOOD HOLD COAG: NORMAL
WHOLE BLOOD HOLD SPECIMEN: NORMAL

## 2024-03-09 PROCEDURE — 71045 X-RAY EXAM CHEST 1 VIEW: CPT

## 2024-03-09 PROCEDURE — 80053 COMPREHEN METABOLIC PANEL: CPT | Performed by: EMERGENCY MEDICINE

## 2024-03-09 PROCEDURE — 83880 ASSAY OF NATRIURETIC PEPTIDE: CPT | Performed by: EMERGENCY MEDICINE

## 2024-03-09 PROCEDURE — 85007 BL SMEAR W/DIFF WBC COUNT: CPT | Performed by: EMERGENCY MEDICINE

## 2024-03-09 PROCEDURE — 85025 COMPLETE CBC W/AUTO DIFF WBC: CPT | Performed by: EMERGENCY MEDICINE

## 2024-03-09 PROCEDURE — 99284 EMERGENCY DEPT VISIT MOD MDM: CPT

## 2024-03-09 PROCEDURE — 93005 ELECTROCARDIOGRAM TRACING: CPT | Performed by: EMERGENCY MEDICINE

## 2024-03-09 PROCEDURE — 83690 ASSAY OF LIPASE: CPT | Performed by: EMERGENCY MEDICINE

## 2024-03-09 PROCEDURE — 36415 COLL VENOUS BLD VENIPUNCTURE: CPT

## 2024-03-09 PROCEDURE — 84484 ASSAY OF TROPONIN QUANT: CPT | Performed by: EMERGENCY MEDICINE

## 2024-03-09 RX ORDER — ASPIRIN 81 MG/1
324 TABLET, CHEWABLE ORAL ONCE
Status: DISCONTINUED | OUTPATIENT
Start: 2024-03-09 | End: 2024-03-09 | Stop reason: HOSPADM

## 2024-03-09 RX ORDER — NITROGLYCERIN 0.4 MG/1
0.4 TABLET SUBLINGUAL
Status: DISCONTINUED | OUTPATIENT
Start: 2024-03-09 | End: 2024-03-09 | Stop reason: HOSPADM

## 2024-03-09 RX ORDER — SODIUM CHLORIDE 0.9 % (FLUSH) 0.9 %
10 SYRINGE (ML) INJECTION AS NEEDED
Status: DISCONTINUED | OUTPATIENT
Start: 2024-03-09 | End: 2024-03-09 | Stop reason: HOSPADM

## 2024-03-09 RX ORDER — ISOSORBIDE MONONITRATE 30 MG/1
30 TABLET, EXTENDED RELEASE ORAL DAILY
Qty: 30 TABLET | Refills: 0 | Status: SHIPPED | OUTPATIENT
Start: 2024-03-09

## 2024-03-09 NOTE — ED PROVIDER NOTES
Subjective   History of Present Illness  70-year-old male presents emergency department today with chest pain.  He woke up this morning having heaviness in his chest.  Patient reports it did not radiate into his neck or down to his arm did not go through his back.  He did not get short of breath diaphoretic or nauseated.  He did take a nitroglycerin was about 4 years old given little bit of a headache but it did not really alleviate his pain.  His pain initially was an 8 out of 10 now 4 out of 10.  Denies abdominal pain associated with this.  He does not have a history of renal disease, hypertension, hyperlipidemia, previous bypass due to coronary disease.,  Diabetes.    History provided by:  Patient and significant other   used: No    Chest Pain  Pain location:  Substernal area  Pain quality: pressure and tightness    Pain radiates to:  Does not radiate  Pain severity:  Severe  Onset quality:  Sudden  Duration:  1 hour  Timing:  Constant  Progression:  Partially resolved  Chronicity:  New  Context: at rest    Context: not breathing, not intercourse, not raising an arm, not stress and not trauma    Relieved by:  Nothing  Worsened by:  Nothing  Ineffective treatments:  Nitroglycerin  Associated symptoms: no abdominal pain, no AICD problem, no anorexia, no anxiety, no claudication, no cough, no diaphoresis, no dizziness, no fatigue, no headache, no heartburn, no lower extremity edema, no nausea, no near-syncope, no orthopnea, no shortness of breath, no vomiting and no weakness    Risk factors: coronary artery disease, diabetes mellitus, high cholesterol, hypertension and male sex    Risk factors: no aortic disease, no Marfan's syndrome, not obese, no smoking and no surgery        Review of Systems   Constitutional:  Negative for diaphoresis and fatigue.   Respiratory:  Negative for cough and shortness of breath.    Cardiovascular:  Positive for chest pain. Negative for orthopnea, claudication and  near-syncope.   Gastrointestinal:  Negative for abdominal pain, anorexia, heartburn, nausea and vomiting.   Neurological:  Negative for dizziness, weakness and headaches.       Past Medical History:   Diagnosis Date    Allergic     Arthritis     Asthma     Coronary artery disease     Diabetes mellitus 2000    started on inuslin 12/2018; started on po meds in 2000; checking blood sugars daily     Disease of thyroid gland     po meds daily for hypothyroidism     Elevated serum creatinine 11/15/2022    Elevated troponin 10/02/2022    Generalized weakness 11/15/2022    History of fracture as a child     rt leg- severe     Hyperlipidemia     Hypertension     Hypothyroidism     PAF (paroxysmal atrial fibrillation) 07/23/2023    Peripheral neuropathy     Peripheral vascular disease     s/p angiogram 2/19-needs stent in left leg     Pleural effusion, bilateral 11/15/2022    Proximal phalanx fracture of the second digit extending into the second metatarsal joint 07/28/2022    RBBB     Right knee pain     Unstable angina 02/12/2019    Added automatically from request for surgery [id number]    Vitamin D deficiency        Allergies   Allergen Reactions    Vancomycin Other (See Comments)     Kidney failure per last admission       Past Surgical History:   Procedure Laterality Date    AMPUTATION DIGIT Left 01/17/2023    Procedure: AMPUTATION DIGIT LEFT;  Surgeon: Gopal Márquez MD;  Location:  [facility];  Service: Vascular;  Laterality: Left;    APPENDECTOMY      CARDIAC CATHETERIZATION N/A 02/14/2019    Procedure: Left Heart Cath;  Surgeon: Cooper Apodaca MD;  Location: [facility] CATH INVASIVE LOCATION;  Service: Cardiology    CARDIAC ELECTROPHYSIOLOGY PROCEDURE N/A 05/18/2022    Procedure: DEVICE IMPLANT;  Surgeon: Cooper Apodaca MD;  Location:  [facility] CATH INVASIVE LOCATION;  Service: Cardiology;  Laterality: N/A;    CARDIAC SURGERY      COLONOSCOPY      CORONARY ARTERY BYPASS GRAFT N/A 03/22/2019    Procedure:  MEDIAN STERNOTOMY, CORONARY ARTERY BYPASS GRAFT X3, UTILIZING THE LEFT INTERNAL MAMMARY ARTERY, EVH AND OPEN HARVEST OF THE RIGHT GREATER SAPHENOUS VEIN, EXPLORATION OF THE LEFT LEG;  Surgeon: Ap Au MD;  Location:  [facility];  Service: Cardiothoracic    EYE SURGERY Bilateral     cataracts     FRACTURE SURGERY      INTERVENTIONAL RADIOLOGY PROCEDURE N/A 07/29/2021    Procedure: Abdominal Aortagram with Runoff;  Surgeon: Jermaine Hernandez MD;  Location:  [facility] CATH INVASIVE LOCATION;  Service: Cardiovascular;  Laterality: N/A;    KNEE ARTHROSCOPY      right x 2, left x 1    LACERATION REPAIR      right leg    LEG SURGERY      2 for fracture of rt leg     TONSILLECTOMY      Adnoidectomy    TRANS METATARSAL AMPUTATION Left 08/02/2022    Procedure: GREAT TOE AMPUTATION LEFT;  Surgeon: Gopal Márquez MD;  Location:  [facility];  Service: Vascular;  Laterality: Left;       Family History   Problem Relation Age of Onset    Coronary artery disease Mother     Diabetes Mother     Hyperlipidemia Mother     Cancer Father        Social History     Socioeconomic History    Marital status:     Number of children: 2   Tobacco Use    Smoking status: Never     Passive exposure: Past    Smokeless tobacco: Never   Vaping Use    Vaping status: Never Used   Substance and Sexual Activity    Alcohol use: Not Currently     Comment: every 2-3 months    Drug use: No    Sexual activity: Not Currently     Partners: Female           Objective   Physical Exam  Vitals and nursing note reviewed.   Constitutional:       Appearance: He is well-developed.      Comments: Pale nontoxic warm dry   HENT:      Head: Normocephalic and atraumatic.      Right Ear: External ear normal.      Left Ear: External ear normal.      Nose: Nose normal.   Eyes:      General: No scleral icterus.     Conjunctiva/sclera: Conjunctivae normal.      Pupils: Pupils are equal, round, and reactive to light.   Neck:      Thyroid: No thyromegaly.    Cardiovascular:      Rate and Rhythm: Normal rate and regular rhythm. No extrasystoles are present.     Chest Wall: PMI is not displaced.      Heart sounds: Normal heart sounds.   Pulmonary:      Effort: Pulmonary effort is normal. No respiratory distress.      Breath sounds: Normal breath sounds. No wheezing, rhonchi or rales.   Chest:      Chest wall: No tenderness.   Abdominal:      General: Bowel sounds are normal. There is no distension.      Palpations: Abdomen is soft.      Tenderness: There is no abdominal tenderness.   Musculoskeletal:         General: Normal range of motion.      Cervical back: Normal range of motion.   Lymphadenopathy:      Cervical: No cervical adenopathy.   Skin:     General: Skin is warm and dry.   Neurological:      Mental Status: He is alert and oriented to person, place, and time.      Cranial Nerves: No cranial nerve deficit.      Coordination: Coordination normal.      Deep Tendon Reflexes: Reflexes are normal and symmetric. Reflexes normal.   Psychiatric:         Behavior: Behavior normal.         Thought Content: Thought content normal.         Judgment: Judgment normal.         Procedures           ED Course  ED Course as of 03/10/24 1110   Sat Mar 09, 2024   1330 Paged cardiology on-call.  Dr. Apodaca patient. [ROSELIA]   1332 Hemoglobin(!): 10.1 [ROSELIA]   1332 Troponin T Delta(!): -14 [ROSELIA]   1400 Discussed with Dr. Cummins who reviewed the EKGs and labs he has been started on Imdur 30 mg.  Follow-up with Dr. Apodaca this week. [ROSELIA]   1400 Laboratory data troponin dropped by 14 points.  BNP was 34,000.  CBC white count 7.7 with an H&H of 10 and 31 and a platelet count of 150.  CMP glucose 173 BUN elevated 81 and a creatinine of 2.3 this is chronically elevated.  Sodium 135 potassium of [ROSELIA]      ED Course User Index  [ROSELIA] Donny Canchola PA                HEART Score: 7                    Recent Results (from the past 24 hour(s))   High Sensitivity Troponin T    Collection  Time: 03/09/24 10:21 AM    Specimen: Arm, Right; Blood   Result Value Ref Range    HS Troponin T 182 (C) <22 ng/L   Comprehensive Metabolic Panel    Collection Time: 03/09/24 10:21 AM    Specimen: Arm, Right; Blood   Result Value Ref Range    Glucose 173 (H) 65 - 99 mg/dL    BUN 81 (H) 8 - 23 mg/dL    Creatinine 2.33 (H) 0.76 - 1.27 mg/dL    Sodium 135 (L) 136 - 145 mmol/L    Potassium 5.6 (H) 3.5 - 5.2 mmol/L    Chloride 106 98 - 107 mmol/L    CO2 17.0 (L) 22.0 - 29.0 mmol/L    Calcium 8.8 8.6 - 10.5 mg/dL    Total Protein 6.3 6.0 - 8.5 g/dL    Albumin 3.7 3.5 - 5.2 g/dL    ALT (SGPT) 22 1 - 41 U/L    AST (SGOT) 16 1 - 40 U/L    Alkaline Phosphatase 77 39 - 117 U/L    Total Bilirubin 0.3 0.0 - 1.2 mg/dL    Globulin 2.6 gm/dL    A/G Ratio 1.4 g/dL    BUN/Creatinine Ratio 34.8 (H) 7.0 - 25.0    Anion Gap 12.0 5.0 - 15.0 mmol/L    eGFR 27.9 (L) >60.0 mL/min/1.73   Lipase    Collection Time: 03/09/24 10:21 AM    Specimen: Arm, Right; Blood   Result Value Ref Range    Lipase 20 13 - 60 U/L   BNP    Collection Time: 03/09/24 10:21 AM    Specimen: Arm, Right; Blood   Result Value Ref Range    proBNP 34,974.0 (H) 0.0 - 1,800.0 pg/mL   Green Top (Gel)    Collection Time: 03/09/24 10:21 AM   Result Value Ref Range    Extra Tube Hold for add-ons.    Gold Top - SST    Collection Time: 03/09/24 10:21 AM   Result Value Ref Range    Extra Tube Hold for add-ons.    High Sensitivity Troponin T 2Hr    Collection Time: 03/09/24 12:41 PM    Specimen: Arm, Right; Blood   Result Value Ref Range    HS Troponin T 168 (C) <22 ng/L    Troponin T Delta -14 (L) >=-4 - <+4 ng/L   ECG 12 Lead ED Triage Standing Order; Chest Pain    Collection Time: 03/09/24 12:49 PM   Result Value Ref Range    QT Interval 538 ms    QTC Interval 550 ms     Note: In addition to lab results from this visit, the labs listed above may include labs taken at another facility or during a different encounter within the last 24 hours. Please correlate lab times with ED  admission and discharge times for further clarification of the services performed during this visit.    XR Chest 1 View   Final Result   Impression:   1.Cardiomegaly   2.Atelectasis lingular area or underlying scarring.         Electronically Signed: Titus Barbosa MD     3/9/2024 9:37 AM EST     Workstation ID: [id number]        Vitals:    03/09/24 0929 03/09/24 1000 03/09/24 1058 03/09/24 1130   BP: 126/82 104/64 105/60 101/58   BP Location:       Patient Position:       Pulse: 77 77 61 60   Resp:       Temp:       TempSrc:       SpO2: 98%  99% 97%   Weight:       Height:         Medications - No data to display    ECG/EMG Results (last 24 hours)       Procedure Component Value Units Date/Time    ECG 12 Lead ED Triage Standing Order; Chest Pain [[id number]] Collected: 03/09/24 0925     Updated: 03/09/24 0924     QT Interval 528 ms      QTC Interval 544 ms     Narrative:      Test Reason : CP  Blood Pressure :   */*   mmHG  Vent. Rate :  64 BPM     Atrial Rate :  64 BPM     P-R Int : 142 ms          QRS Dur : 172 ms      QT Int : 528 ms       P-R-T Axes :  35 -55 126 degrees     QTc Int : 544 ms    Atrial-sensed ventricular-paced rhythm  Abnormal ECG  When compared with ECG of 23-JUL-2023 16:29,  Vent. rate has decreased BY  33 BPM    Referred By: EDMD           Confirmed By:     ECG 12 Lead ED Triage Standing Order; Chest Pain [[id number]] Collected: 03/09/24 1249     Updated: 03/09/24 1247     QT Interval 538 ms      QTC Interval 550 ms     Narrative:      Test Reason : ED Triage Standing Order~  Blood Pressure :   */*   mmHG  Vent. Rate :  63 BPM     Atrial Rate :  63 BPM     P-R Int : 104 ms          QRS Dur : 168 ms      QT Int : 538 ms       P-R-T Axes :  55 -53 124 degrees     QTc Int : 550 ms    Atrial-sensed ventricular-paced rhythm  Abnormal ECG  When compared with ECG of 09-MAR-2024 09:25, (Unconfirmed)  No significant change was found    Referred By: EDMD           Confirmed By:           ECG 12 Lead ED  Triage Standing Order; Chest Pain   Final Result   Test Reason : ED Triage Standing Order~   Blood Pressure :   */*   mmHG   Vent. Rate :  63 BPM     Atrial Rate :  63 BPM      P-R Int : 104 ms          QRS Dur : 168 ms       QT Int : 538 ms       P-R-T Axes :  55 -53 124 degrees      QTc Int : 550 ms      Atrial-sensed ventricular-paced rhythm   Abnormal ECG   When compared with ECG of 09-MAR-2024 09:25, (Unconfirmed)   No significant change was found   Confirmed by MD Mchugh Michael ([id number]) on 3/10/2024 5:54:05 AM      Referred By: RAGINI           Confirmed By: Cooper Mchugh MD      ECG 12 Lead ED Triage Standing Order; Chest Pain   Final Result   Test Reason : CP   Blood Pressure :   */*   mmHG   Vent. Rate :  64 BPM     Atrial Rate :  64 BPM      P-R Int : 142 ms          QRS Dur : 172 ms       QT Int : 528 ms       P-R-T Axes :  35 -55 126 degrees      QTc Int : 544 ms      Atrial-sensed ventricular-paced rhythm   Abnormal ECG   When compared with ECG of 23-JUL-2023 16:29,   Vent. rate has decreased BY  33 BPM   Confirmed by MD Mchugh Michael ([id number]) on 3/10/2024 5:53:51 AM      Referred By: RAGINI           Confirmed By: Cooper Mchugh MD                    Medical Decision Making  Problems Addressed:  Chest pain, unspecified type: complicated acute illness or injury    Amount and/or Complexity of Data Reviewed  Labs: ordered. Decision-making details documented in ED Course.  Radiology: ordered. Decision-making details documented in ED Course.  ECG/medicine tests: ordered. Decision-making details documented in ED Course.    Risk  OTC drugs.  Prescription drug management.        Final diagnoses:   Chest pain, unspecified type       ED Disposition  ED Disposition       ED Disposition   Discharge    Condition   Stable    Comment   --               Cooper Apodaca MD  [address] [address]  [phone]               Medication List        New Prescriptions      isosorbide mononitrate 30  MG 24 hr tablet  Commonly known as: IMDUR  Take 1 tablet by mouth Daily.            Changed      bumetanide 1 MG tablet  Commonly known as: BUMEX  Take 1 tablet by mouth 2 (Two) Times a Day.  What changed: additional instructions               Where to Get Your Medications        These medications were sent to [facility] [address] [place] [address] [facility] - [phone]  - [phone]   [address] [facility] [address]      Phone: [phone]   isosorbide mononitrate 30 MG 24 hr tablet            Donny Canchola PA  03/09/24 1215       Donny Canchola PA  03/10/24 1110

## 2024-03-10 LAB
QT INTERVAL: 528 MS
QT INTERVAL: 538 MS
QTC INTERVAL: 544 MS
QTC INTERVAL: 550 MS

## 2024-03-11 ENCOUNTER — PATIENT OUTREACH (OUTPATIENT)
Dept: CASE MANAGEMENT | Facility: OTHER | Age: 79
End: 2024-03-11
Payer: MEDICARE

## 2024-03-11 NOTE — OUTREACH NOTE
AMBULATORY CASE MANAGEMENT NOTE    Name and Relationship of Patient/Support Person: HIPOLITO DAWN - Emergency Contact    Patient Outreach    Spoke with patient's wife as a follow up call regarding CCM. Wife reports she is in the process of applying for [facility] services and respite care for patient. Discussed CCM, wife is not interested at this time, she is agreeable to reach out to ACM if needed. Mailed CCM brochure to patient with my contact information. Referral closed, updated PCP via Epic messaging.     Anne-Marie QUINTANA  Ambulatory Case Management    3/11/2024, 10:53 EDT

## 2024-03-19 ENCOUNTER — TELEMEDICINE (OUTPATIENT)
Dept: PSYCHIATRY | Facility: CLINIC | Age: 79
End: 2024-03-19
Payer: MEDICARE

## 2024-03-19 DIAGNOSIS — F33.1 MODERATE EPISODE OF RECURRENT MAJOR DEPRESSIVE DISORDER: Primary | ICD-10-CM
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
## 2024-03-20 DIAGNOSIS — B37.49 YEAST UTI: ICD-10-CM

## 2024-03-20 NOTE — TELEPHONE ENCOUNTER
"Caller: Jermaine Singh \"Adi\"    Relationship: Self    Best call back number: [phone]     Requested Prescriptions:   Requested Prescriptions     Pending Prescriptions Disp Refills    fluconazole (Diflucan) 100 MG tablet 6 tablet 0     Si PO  x 1d, then 1 PO QD x 4d        Pharmacy where request should be sent: [facility] [address] [place] [address] [phone] [facility] [phone] FX     Last office visit with prescribing clinician: 2024   Last telemedicine visit with prescribing clinician: Visit date not found   Next office visit with prescribing clinician: 2024     Additional details provided by patient: PATIENT STATES HE HAS FINISHED THE PRESCRIPTION DR. SANTIAGO PRESCRIBED ON 3/5/24 BUT IS STILL EXPERIENCING BURNING WHEN URINATING.     Does the patient have less than a 3 day supply:  [x] Yes  [] No    Would you like a call back once the refill request has been completed: [] Yes [x] No    If the office needs to give you a call back, can they leave a voicemail: [] Yes [x] No    Ata Gerardo Rep   24 17:18 EDT           "

## 2024-03-21 RX ORDER — FLUCONAZOLE 100 MG/1
TABLET ORAL
Qty: 6 TABLET | Refills: 0 | OUTPATIENT
Start: 2024-03-21

## 2024-03-29 ENCOUNTER — TELEPHONE (OUTPATIENT)
Dept: CARDIAC SURGERY | Facility: CLINIC | Age: 79
End: 2024-03-29
Payer: MEDICARE

## 2024-04-01 NOTE — TELEPHONE ENCOUNTER
Caller: HIPOLITO DAWN    Relationship: Emergency Contact    Best call back number:      Requested Prescriptions:   Requested Prescriptions     Pending Prescriptions Disp Refills    atorvastatin (LIPITOR) 20 MG tablet 90 tablet 3     Sig: Take 1 tablet by mouth Every Night.        Pharmacy where request should be sent: [facility] [address] [place] [address] [phone] [facility] [phone]     Last office visit with prescribing clinician: 1/24/2024   Last telemedicine visit with prescribing clinician: Visit date not found   Next office visit with prescribing clinician: 7/24/2024     Additional details provided by patient: PATIENT IS OUT OF MEDICATION AND PHARMACY IS NEEDING A NEW PRESCRIPTION AS THIS WAS REFILLED PREVIOUSLY BY ANOTHER PCP    Does the patient have less than a 3 day supply:  [x] Yes  [] No      Ata Aguero Rep   04/01/24 09:31 EDT

## 2024-04-02 RX ORDER — ATORVASTATIN CALCIUM 20 MG/1
20 TABLET, FILM COATED ORAL NIGHTLY
Qty: 90 TABLET | Refills: 1 | Status: SHIPPED | OUTPATIENT
Start: 2024-04-02

## 2024-04-02 RX ORDER — ATORVASTATIN CALCIUM 20 MG/1
20 TABLET, FILM COATED ORAL NIGHTLY
Qty: 90 TABLET | Refills: 0 | OUTPATIENT
Start: 2024-04-02

## 2024-04-16 ENCOUNTER — OUTSIDE FACILITY SERVICE (OUTPATIENT)
Dept: INTERNAL MEDICINE | Facility: CLINIC | Age: 79
End: 2024-04-16
Payer: MEDICARE

## 2024-04-22 ENCOUNTER — TELEPHONE (OUTPATIENT)
Dept: INTERNAL MEDICINE | Facility: CLINIC | Age: 79
End: 2024-04-22

## 2024-04-22 ENCOUNTER — APPOINTMENT (OUTPATIENT)
Dept: GENERAL RADIOLOGY | Facility: HOSPITAL | Age: 79
End: 2024-04-22
Payer: MEDICARE

## 2024-04-22 ENCOUNTER — HOSPITAL ENCOUNTER (INPATIENT)
Facility: HOSPITAL | Age: 79
LOS: 15 days | Discharge: HOSPICE | End: 2024-05-08
Attending: EMERGENCY MEDICINE | Admitting: INTERNAL MEDICINE
Payer: MEDICARE

## 2024-04-22 DIAGNOSIS — N39.0 ACUTE UTI: Primary | ICD-10-CM

## 2024-04-22 DIAGNOSIS — R60.0 EDEMA OF RIGHT LOWER EXTREMITY: ICD-10-CM

## 2024-04-22 DIAGNOSIS — R79.89 ELEVATED TROPONIN: ICD-10-CM

## 2024-04-22 DIAGNOSIS — R13.12 OROPHARYNGEAL DYSPHAGIA: ICD-10-CM

## 2024-04-22 DIAGNOSIS — L53.9 LEG ERYTHEMA: ICD-10-CM

## 2024-04-22 DIAGNOSIS — I46.9 CARDIAC ARREST: ICD-10-CM

## 2024-04-22 DIAGNOSIS — N18.9 CHRONIC KIDNEY DISEASE, UNSPECIFIED CKD STAGE: ICD-10-CM

## 2024-04-22 DIAGNOSIS — R53.1 GENERALIZED WEAKNESS: ICD-10-CM

## 2024-04-22 DIAGNOSIS — I50.20 HFREF (HEART FAILURE WITH REDUCED EJECTION FRACTION): ICD-10-CM

## 2024-04-22 DIAGNOSIS — E87.5 HYPERKALEMIA: ICD-10-CM

## 2024-04-22 LAB
ALBUMIN SERPL-MCNC: 3.7 G/DL (ref 3.5–5.2)
ALBUMIN/GLOB SERPL: 1.4 G/DL
ALP SERPL-CCNC: 80 U/L (ref 39–117)
ALT SERPL W P-5'-P-CCNC: 26 U/L (ref 1–41)
ANION GAP SERPL CALCULATED.3IONS-SCNC: 12 MMOL/L (ref 5–15)
AST SERPL-CCNC: 18 U/L (ref 1–40)
BACTERIA UR QL AUTO: ABNORMAL /HPF
BASOPHILS # BLD AUTO: 0.03 10*3/MM3 (ref 0–0.2)
BASOPHILS NFR BLD AUTO: 0.3 % (ref 0–1.5)
BILIRUB SERPL-MCNC: 0.4 MG/DL (ref 0–1.2)
BILIRUB UR QL STRIP: NEGATIVE
BUN SERPL-MCNC: 77 MG/DL (ref 8–23)
BUN/CREAT SERPL: 45.3 (ref 7–25)
CALCIUM SPEC-SCNC: 8.8 MG/DL (ref 8.6–10.5)
CHLORIDE SERPL-SCNC: 108 MMOL/L (ref 98–107)
CLARITY UR: CLEAR
CO2 SERPL-SCNC: 17 MMOL/L (ref 22–29)
COLOR UR: YELLOW
CREAT SERPL-MCNC: 1.7 MG/DL (ref 0.76–1.27)
DEPRECATED RDW RBC AUTO: 47.4 FL (ref 37–54)
EGFRCR SERPLBLD CKD-EPI 2021: 40.5 ML/MIN/1.73
EOSINOPHIL # BLD AUTO: 0.16 10*3/MM3 (ref 0–0.4)
EOSINOPHIL NFR BLD AUTO: 1.6 % (ref 0.3–6.2)
ERYTHROCYTE [DISTWIDTH] IN BLOOD BY AUTOMATED COUNT: 13.1 % (ref 12.3–15.4)
FERRITIN SERPL-MCNC: 309.8 NG/ML (ref 30–400)
GEN 5 2HR TROPONIN T REFLEX: 119 NG/L
GLOBULIN UR ELPH-MCNC: 2.7 GM/DL
GLUCOSE BLDC GLUCOMTR-MCNC: 229 MG/DL (ref 70–130)
GLUCOSE BLDC GLUCOMTR-MCNC: 247 MG/DL (ref 70–130)
GLUCOSE BLDC GLUCOMTR-MCNC: 304 MG/DL (ref 70–130)
GLUCOSE SERPL-MCNC: 264 MG/DL (ref 65–99)
GLUCOSE UR STRIP-MCNC: NEGATIVE MG/DL
HCT VFR BLD AUTO: 33.5 % (ref 37.5–51)
HGB BLD-MCNC: 10.1 G/DL (ref 13–17.7)
HGB UR QL STRIP.AUTO: ABNORMAL
HOLD SPECIMEN: NORMAL
HYALINE CASTS UR QL AUTO: ABNORMAL /LPF
IMM GRANULOCYTES # BLD AUTO: 0.05 10*3/MM3 (ref 0–0.05)
IMM GRANULOCYTES NFR BLD AUTO: 0.5 % (ref 0–0.5)
IRON 24H UR-MRATE: 22 MCG/DL (ref 59–158)
IRON 24H UR-MRATE: 22 MCG/DL (ref 59–158)
IRON SATN MFR SERPL: 7 % (ref 20–50)
KETONES UR QL STRIP: NEGATIVE
LEUKOCYTE ESTERASE UR QL STRIP.AUTO: ABNORMAL
LYMPHOCYTES # BLD AUTO: 0.38 10*3/MM3 (ref 0.7–3.1)
LYMPHOCYTES NFR BLD AUTO: 3.7 % (ref 19.6–45.3)
MAGNESIUM SERPL-MCNC: 2.2 MG/DL (ref 1.6–2.4)
MCH RBC QN AUTO: 30 PG (ref 26.6–33)
MCHC RBC AUTO-ENTMCNC: 30.1 G/DL (ref 31.5–35.7)
MCV RBC AUTO: 99.4 FL (ref 79–97)
MONOCYTES # BLD AUTO: 0.95 10*3/MM3 (ref 0.1–0.9)
MONOCYTES NFR BLD AUTO: 9.2 % (ref 5–12)
NEUTROPHILS NFR BLD AUTO: 8.74 10*3/MM3 (ref 1.7–7)
NEUTROPHILS NFR BLD AUTO: 84.7 % (ref 42.7–76)
NITRITE UR QL STRIP: NEGATIVE
NRBC BLD AUTO-RTO: 0 /100 WBC (ref 0–0.2)
PH UR STRIP.AUTO: <=5 [PH] (ref 5–8)
PLATELET # BLD AUTO: 108 10*3/MM3 (ref 140–450)
PMV BLD AUTO: 12.2 FL (ref 6–12)
POTASSIUM SERPL-SCNC: 5.3 MMOL/L (ref 3.5–5.2)
PROT SERPL-MCNC: 6.4 G/DL (ref 6–8.5)
PROT UR QL STRIP: ABNORMAL
RBC # BLD AUTO: 3.37 10*6/MM3 (ref 4.14–5.8)
RBC # UR STRIP: ABNORMAL /HPF
REF LAB TEST METHOD: ABNORMAL
RETICS # AUTO: 0.05 10*6/MM3 (ref 0.02–0.13)
RETICS/RBC NFR AUTO: 1.37 % (ref 0.7–1.9)
SODIUM SERPL-SCNC: 137 MMOL/L (ref 136–145)
SP GR UR STRIP: 1.01 (ref 1–1.03)
SQUAMOUS #/AREA URNS HPF: ABNORMAL /HPF
TIBC SERPL-MCNC: 325 MCG/DL (ref 298–536)
TRANSFERRIN SERPL-MCNC: 218 MG/DL (ref 200–360)
TROPONIN T DELTA: -4 NG/L
TROPONIN T SERPL HS-MCNC: 123 NG/L
TSH SERPL DL<=0.05 MIU/L-ACNC: 1.94 UIU/ML (ref 0.27–4.2)
UROBILINOGEN UR QL STRIP: ABNORMAL
WBC # UR STRIP: ABNORMAL /HPF
WBC NRBC COR # BLD AUTO: 10.31 10*3/MM3 (ref 3.4–10.8)
WHOLE BLOOD HOLD SPECIMEN: NORMAL

## 2024-04-22 PROCEDURE — G0378 HOSPITAL OBSERVATION PER HR: HCPCS

## 2024-04-22 PROCEDURE — 36415 COLL VENOUS BLD VENIPUNCTURE: CPT

## 2024-04-22 PROCEDURE — 99285 EMERGENCY DEPT VISIT HI MDM: CPT

## 2024-04-22 PROCEDURE — 84484 ASSAY OF TROPONIN QUANT: CPT | Performed by: EMERGENCY MEDICINE

## 2024-04-22 PROCEDURE — 83735 ASSAY OF MAGNESIUM: CPT | Performed by: EMERGENCY MEDICINE

## 2024-04-22 PROCEDURE — 93005 ELECTROCARDIOGRAM TRACING: CPT

## 2024-04-22 PROCEDURE — 84466 ASSAY OF TRANSFERRIN: CPT | Performed by: NURSE PRACTITIONER

## 2024-04-22 PROCEDURE — 82948 REAGENT STRIP/BLOOD GLUCOSE: CPT

## 2024-04-22 PROCEDURE — 85045 AUTOMATED RETICULOCYTE COUNT: CPT | Performed by: NURSE PRACTITIONER

## 2024-04-22 PROCEDURE — 99223 1ST HOSP IP/OBS HIGH 75: CPT | Performed by: INTERNAL MEDICINE

## 2024-04-22 PROCEDURE — 82607 VITAMIN B-12: CPT | Performed by: INTERNAL MEDICINE

## 2024-04-22 PROCEDURE — 85025 COMPLETE CBC W/AUTO DIFF WBC: CPT | Performed by: EMERGENCY MEDICINE

## 2024-04-22 PROCEDURE — 25010000002 PROCHLORPERAZINE 10 MG/2ML SOLUTION: Performed by: NURSE PRACTITIONER

## 2024-04-22 PROCEDURE — 81001 URINALYSIS AUTO W/SCOPE: CPT | Performed by: EMERGENCY MEDICINE

## 2024-04-22 PROCEDURE — 87086 URINE CULTURE/COLONY COUNT: CPT | Performed by: NURSE PRACTITIONER

## 2024-04-22 PROCEDURE — 80053 COMPREHEN METABOLIC PANEL: CPT | Performed by: EMERGENCY MEDICINE

## 2024-04-22 PROCEDURE — 83540 ASSAY OF IRON: CPT | Performed by: NURSE PRACTITIONER

## 2024-04-22 PROCEDURE — 63710000001 INSULIN LISPRO (HUMAN) PER 5 UNITS: Performed by: INTERNAL MEDICINE

## 2024-04-22 PROCEDURE — 25010000002 CEFTRIAXONE PER 250 MG: Performed by: INTERNAL MEDICINE

## 2024-04-22 PROCEDURE — 71045 X-RAY EXAM CHEST 1 VIEW: CPT

## 2024-04-22 PROCEDURE — 82728 ASSAY OF FERRITIN: CPT | Performed by: NURSE PRACTITIONER

## 2024-04-22 PROCEDURE — 93005 ELECTROCARDIOGRAM TRACING: CPT | Performed by: EMERGENCY MEDICINE

## 2024-04-22 PROCEDURE — 84443 ASSAY THYROID STIM HORMONE: CPT | Performed by: INTERNAL MEDICINE

## 2024-04-22 PROCEDURE — 73630 X-RAY EXAM OF FOOT: CPT

## 2024-04-22 PROCEDURE — 82746 ASSAY OF FOLIC ACID SERUM: CPT | Performed by: INTERNAL MEDICINE

## 2024-04-22 PROCEDURE — P9612 CATHETERIZE FOR URINE SPEC: HCPCS

## 2024-04-22 RX ORDER — INSULIN LISPRO 100 [IU]/ML
2-9 INJECTION, SOLUTION INTRAVENOUS; SUBCUTANEOUS
Status: DISCONTINUED | OUTPATIENT
Start: 2024-04-22 | End: 2024-04-25

## 2024-04-22 RX ORDER — PROCHLORPERAZINE EDISYLATE 5 MG/ML
5 INJECTION INTRAMUSCULAR; INTRAVENOUS EVERY 6 HOURS PRN
Status: DISCONTINUED | OUTPATIENT
Start: 2024-04-22 | End: 2024-05-08 | Stop reason: HOSPADM

## 2024-04-22 RX ORDER — IBUPROFEN 600 MG/1
1 TABLET ORAL
Status: DISCONTINUED | OUTPATIENT
Start: 2024-04-22 | End: 2024-05-05

## 2024-04-22 RX ORDER — LEVOTHYROXINE SODIUM 88 UG/1
88 TABLET ORAL
Status: DISCONTINUED | OUTPATIENT
Start: 2024-04-23 | End: 2024-05-08 | Stop reason: HOSPADM

## 2024-04-22 RX ORDER — AMOXICILLIN 250 MG
2 CAPSULE ORAL 2 TIMES DAILY PRN
Status: DISCONTINUED | OUTPATIENT
Start: 2024-04-22 | End: 2024-04-29

## 2024-04-22 RX ORDER — ASPIRIN 81 MG/1
81 TABLET, CHEWABLE ORAL DAILY
Status: DISCONTINUED | OUTPATIENT
Start: 2024-04-23 | End: 2024-05-08 | Stop reason: HOSPADM

## 2024-04-22 RX ORDER — BISACODYL 5 MG/1
5 TABLET, DELAYED RELEASE ORAL DAILY PRN
Status: DISCONTINUED | OUTPATIENT
Start: 2024-04-22 | End: 2024-04-26

## 2024-04-22 RX ORDER — SODIUM CHLORIDE 0.9 % (FLUSH) 0.9 %
10 SYRINGE (ML) INJECTION AS NEEDED
Status: DISCONTINUED | OUTPATIENT
Start: 2024-04-22 | End: 2024-04-29

## 2024-04-22 RX ORDER — NITROGLYCERIN 0.4 MG/1
0.4 TABLET SUBLINGUAL
Status: DISCONTINUED | OUTPATIENT
Start: 2024-04-22 | End: 2024-05-08

## 2024-04-22 RX ORDER — ACETAMINOPHEN 325 MG/1
650 TABLET ORAL EVERY 6 HOURS PRN
Status: DISCONTINUED | OUTPATIENT
Start: 2024-04-22 | End: 2024-05-08 | Stop reason: HOSPADM

## 2024-04-22 RX ORDER — FAMOTIDINE 20 MG/1
20 TABLET, FILM COATED ORAL 2 TIMES DAILY PRN
Status: DISCONTINUED | OUTPATIENT
Start: 2024-04-22 | End: 2024-04-29

## 2024-04-22 RX ORDER — POLYETHYLENE GLYCOL 3350 17 G/17G
17 POWDER, FOR SOLUTION ORAL DAILY PRN
Status: DISCONTINUED | OUTPATIENT
Start: 2024-04-22 | End: 2024-04-29

## 2024-04-22 RX ORDER — LOSARTAN POTASSIUM 50 MG/1
50 TABLET ORAL DAILY
Status: DISCONTINUED | OUTPATIENT
Start: 2024-04-23 | End: 2024-04-28

## 2024-04-22 RX ORDER — POLYETHYLENE GLYCOL 3350 17 G/17G
17 POWDER, FOR SOLUTION ORAL DAILY
Status: DISCONTINUED | OUTPATIENT
Start: 2024-04-23 | End: 2024-05-08 | Stop reason: HOSPADM

## 2024-04-22 RX ORDER — SODIUM CHLORIDE 0.9 % (FLUSH) 0.9 %
10 SYRINGE (ML) INJECTION AS NEEDED
Status: DISCONTINUED | OUTPATIENT
Start: 2024-04-22 | End: 2024-04-25

## 2024-04-22 RX ORDER — AZELASTINE 1 MG/ML
2 SPRAY, METERED NASAL 2 TIMES DAILY
Status: DISCONTINUED | OUTPATIENT
Start: 2024-04-22 | End: 2024-05-01

## 2024-04-22 RX ORDER — BISACODYL 10 MG
10 SUPPOSITORY, RECTAL RECTAL DAILY PRN
Status: DISCONTINUED | OUTPATIENT
Start: 2024-04-22 | End: 2024-04-29

## 2024-04-22 RX ORDER — SODIUM CHLORIDE 0.9 % (FLUSH) 0.9 %
10 SYRINGE (ML) INJECTION EVERY 12 HOURS SCHEDULED
Status: DISCONTINUED | OUTPATIENT
Start: 2024-04-22 | End: 2024-04-29

## 2024-04-22 RX ORDER — ONDANSETRON 2 MG/ML
4 INJECTION INTRAMUSCULAR; INTRAVENOUS EVERY 6 HOURS PRN
Status: DISCONTINUED | OUTPATIENT
Start: 2024-04-22 | End: 2024-04-22

## 2024-04-22 RX ORDER — ATORVASTATIN CALCIUM 20 MG/1
20 TABLET, FILM COATED ORAL NIGHTLY
Status: DISCONTINUED | OUTPATIENT
Start: 2024-04-22 | End: 2024-05-08 | Stop reason: HOSPADM

## 2024-04-22 RX ORDER — ISOSORBIDE MONONITRATE 30 MG/1
30 TABLET, EXTENDED RELEASE ORAL DAILY
Status: DISCONTINUED | OUTPATIENT
Start: 2024-04-23 | End: 2024-04-28

## 2024-04-22 RX ORDER — NICOTINE POLACRILEX 4 MG
15 LOZENGE BUCCAL
Status: DISCONTINUED | OUTPATIENT
Start: 2024-04-22 | End: 2024-04-25

## 2024-04-22 RX ORDER — CARVEDILOL 3.12 MG/1
3.12 TABLET ORAL 2 TIMES DAILY WITH MEALS
Status: DISCONTINUED | OUTPATIENT
Start: 2024-04-22 | End: 2024-04-29

## 2024-04-22 RX ORDER — DEXTROSE MONOHYDRATE 25 G/50ML
25 INJECTION, SOLUTION INTRAVENOUS
Status: DISCONTINUED | OUTPATIENT
Start: 2024-04-22 | End: 2024-05-05

## 2024-04-22 RX ORDER — L.ACID,PARA/B.BIFIDUM/S.THERM 8B CELL
1 CAPSULE ORAL DAILY
Status: DISCONTINUED | OUTPATIENT
Start: 2024-04-23 | End: 2024-05-04

## 2024-04-22 RX ORDER — TAMSULOSIN HYDROCHLORIDE 0.4 MG/1
0.4 CAPSULE ORAL DAILY
Status: DISCONTINUED | OUTPATIENT
Start: 2024-04-23 | End: 2024-05-08 | Stop reason: HOSPADM

## 2024-04-22 RX ORDER — SODIUM CHLORIDE 9 MG/ML
40 INJECTION, SOLUTION INTRAVENOUS AS NEEDED
Status: DISCONTINUED | OUTPATIENT
Start: 2024-04-22 | End: 2024-04-25

## 2024-04-22 RX ORDER — BUMETANIDE 1 MG/1
1 TABLET ORAL 2 TIMES DAILY
Status: DISCONTINUED | OUTPATIENT
Start: 2024-04-22 | End: 2024-04-25

## 2024-04-22 RX ORDER — PANTOPRAZOLE SODIUM 40 MG/1
40 TABLET, DELAYED RELEASE ORAL
Status: DISCONTINUED | OUTPATIENT
Start: 2024-04-23 | End: 2024-04-25

## 2024-04-22 RX ADMIN — ATORVASTATIN CALCIUM 20 MG: 20 TABLET, FILM COATED ORAL at 22:14

## 2024-04-22 RX ADMIN — POLYETHYLENE GLYCOL 3350 17 G: 17 POWDER, FOR SOLUTION ORAL at 22:25

## 2024-04-22 RX ADMIN — PROCHLORPERAZINE EDISYLATE 5 MG: 5 INJECTION INTRAMUSCULAR; INTRAVENOUS at 22:25

## 2024-04-22 RX ADMIN — BUMETANIDE 1 MG: 1 TABLET ORAL at 22:24

## 2024-04-22 RX ADMIN — BISACODYL 5 MG: 5 TABLET, COATED ORAL at 22:25

## 2024-04-22 RX ADMIN — APIXABAN 5 MG: 5 TABLET, FILM COATED ORAL at 22:14

## 2024-04-22 RX ADMIN — SODIUM CHLORIDE 1000 MG: 900 INJECTION INTRAVENOUS at 18:51

## 2024-04-22 RX ADMIN — CARVEDILOL 3.12 MG: 3.12 TABLET, FILM COATED ORAL at 22:14

## 2024-04-22 RX ADMIN — Medication 10 ML: at 22:15

## 2024-04-22 RX ADMIN — FAMOTIDINE 20 MG: 20 TABLET, FILM COATED ORAL at 22:14

## 2024-04-22 RX ADMIN — INSULIN LISPRO 5 UNITS: 100 INJECTION, SOLUTION INTRAVENOUS; SUBCUTANEOUS at 19:30

## 2024-04-22 NOTE — TELEPHONE ENCOUNTER
Caller: HIPOLITO DAWN    Relationship: Emergency Contact    Best call back number: [phone]     What is the best time to reach you: ANYTIME    Who are you requesting to speak with (clinical staff, provider,  specific staff member): DR CANNON'S NURSE    What was the call regarding: NEEDS THE DOCTOR'S NAME THAT WAS SUPPOSED TO DO HERNIA SURGERY LAST YEAR.       show

## 2024-04-22 NOTE — LETTER
EMS Transport Request  For use at [facility], [place], [place], [place], and [place] only   Patient Name: Jermaine Singh : 1945   Weight:87.1 kg (192 lb 0.3 oz) Pick-up Location: [facility] BLS/ALS: BLS/ALS: BLS   Insurance: MEDICARE Auth End Date:    Pre-Cert #: D/C Summary complete:    Destination: [facility] [place] [address] 3 steps to get into the home.  Pt will be on the same level Nerissa garrison [phone]   Contact Precautions:    Equipment (O2, Fluids, etc.):    Arrive By Date/Time: 24 @ 1600 or 24 anytime Stretcher/WC: Stretcher   CM Requesting: Ani Tovar Ext: [phone]   Notes/Medical Necessity: Pt is max assist & requires a maliha lift for transfers     ______________________________________________________________________    *Only 2 patient bags OR 1 carry-on size bag are permitted.  Wheelchairs and walkers CANNOT transported with the patient. Acknowledge: Yes

## 2024-04-22 NOTE — H&P
"    [facility]  HISTORY AND PHYSICAL    Patient Name: Jermaine Singh  : 1945  MRN: [id number]  Primary Care Physician: Marysol Shrestha MD  Date of admission: 2024    Subjective   Subjective     Chief Complaint:  Generalized weakness    HPI:  Jermaine Singh is a 79 y.o. male with a history of CAD, DM, HTN, HLD, PAF, PVD, RBBB, Asthma, weakness, Hypothyroidism, CKD, presents to the ED with complaints of generalized weakness.  Patient has a chronic wound on his RLE and is followed by home health.  The wound on his RLE is being treated with silver and covered with a dressing.  Wife reports that his wound had been improving but today he has had increased serous drainage.  Wife also reports that today she noticed a \"blister\" on the bottom of his right foot.  Patient has had difficulty urinating today and required in and out cath in the ED for retention.  Patient complains of nausea and vomiting this afternoon, dizziness for the past 2 days, fatigue, dysuria, and lower abdominal pain that he attributes to his hernia.  At baseline patient has a lift chair and uses a walker at home.  Today he was unable to stand even with the lift chair due to weakness and called EMS for assistance.  No fevers, shortness of air, chest pain, diarrhea, melena, difficulty with speech, focal weakness, or any other complaints at this time.  UA consistent with UTI.  Chest x-ray shows no active pulmonary process.  X-ray right foot shows large amount of soft tissue swelling with no definite subcutaneous emphysema or destructive bony lesion.  Patient was started on Rocephin in the ED, and is being admitted to the hospitalist for further evaluation and management.        Review of Systems   Constitutional:  Positive for fatigue. Negative for appetite change, chills and fever.   Eyes: Negative.    Respiratory: Negative.     Cardiovascular:  Positive for leg swelling. Negative for chest pain and " palpitations.   Gastrointestinal:  Positive for abdominal pain, constipation, diarrhea, nausea and vomiting. Negative for abdominal distention.   Endocrine: Negative.    Genitourinary:  Positive for difficulty urinating and dysuria. Negative for frequency and urgency.   Musculoskeletal: Negative.    Skin:  Positive for wound.   Allergic/Immunologic: Negative.    Neurological:  Positive for dizziness, weakness, light-headedness and headaches. Negative for syncope, facial asymmetry and speech difficulty.   Hematological: Negative.    Psychiatric/Behavioral: Negative.                  Personal History     Past Medical History:   Diagnosis Date    Allergic     Arthritis     Asthma     Coronary artery disease     Diabetes mellitus 2000    started on inuslin 12/2018; started on po meds in 2000; checking blood sugars daily     Disease of thyroid gland     po meds daily for hypothyroidism     Elevated serum creatinine 11/15/2022    Elevated troponin 10/02/2022    Generalized weakness 11/15/2022    History of fracture as a child     rt leg- severe     Hyperlipidemia     Hypertension     Hypothyroidism     PAF (paroxysmal atrial fibrillation) 07/23/2023    Peripheral neuropathy     Peripheral vascular disease     s/p angiogram 2/19-needs stent in left leg     Pleural effusion, bilateral 11/15/2022    Proximal phalanx fracture of the second digit extending into the second metatarsal joint 07/28/2022    RBBB     Right knee pain     Unstable angina 02/12/2019    Added automatically from request for surgery [id number]    Vitamin D deficiency              Past Surgical History:   Procedure Laterality Date    AMPUTATION DIGIT Left 01/17/2023    Procedure: AMPUTATION DIGIT LEFT;  Surgeon: Gopal Márquez MD;  Location: [facility] OR;  Service: Vascular;  Laterality: Left;    APPENDECTOMY      CARDIAC CATHETERIZATION N/A 02/14/2019    Procedure: Left Heart Cath;  Surgeon: Cooper Apodaca MD;  Location: [facility] CATH INVASIVE  LOCATION;  Service: Cardiology    CARDIAC ELECTROPHYSIOLOGY PROCEDURE N/A 05/18/2022    Procedure: DEVICE IMPLANT;  Surgeon: Cooper Apodaca MD;  Location:  [facility] CATH INVASIVE LOCATION;  Service: Cardiology;  Laterality: N/A;    CARDIAC SURGERY      COLONOSCOPY      CORONARY ARTERY BYPASS GRAFT N/A 03/22/2019    Procedure: MEDIAN STERNOTOMY, CORONARY ARTERY BYPASS GRAFT X3, UTILIZING THE LEFT INTERNAL MAMMARY ARTERY, EVH AND OPEN HARVEST OF THE RIGHT GREATER SAPHENOUS VEIN, EXPLORATION OF THE LEFT LEG;  Surgeon: Ap Au MD;  Location:  [facility] OR;  Service: Cardiothoracic    EYE SURGERY Bilateral     cataracts     FRACTURE SURGERY      INTERVENTIONAL RADIOLOGY PROCEDURE N/A 07/29/2021    Procedure: Abdominal Aortagram with Runoff;  Surgeon: Jermaine Hernandez MD;  Location:  [facility] CATH INVASIVE LOCATION;  Service: Cardiovascular;  Laterality: N/A;    KNEE ARTHROSCOPY      right x 2, left x 1    LACERATION REPAIR      right leg    LEG SURGERY      2 for fracture of rt leg     TONSILLECTOMY      Adnoidectomy    TRANS METATARSAL AMPUTATION Left 08/02/2022    Procedure: GREAT TOE AMPUTATION LEFT;  Surgeon: Gopal Márquez MD;  Location:  [facility] OR;  Service: Vascular;  Laterality: Left;       Family History:  family history includes Cancer in his father; Coronary artery disease in his mother; Diabetes in his mother; Hyperlipidemia in his mother.     Social History:  reports that he has never smoked. He has been exposed to tobacco smoke. He has never used smokeless tobacco. He reports that he does not currently use alcohol. He reports that he does not use drugs.  Social History     Social History Narrative    Lives in [place].       Medications:  Align, Cholecalciferol, Diclofenac Sodium, Hernia Support Right Medium, Hydrocortisone (Perianal), Insulin Lispro, Lifitegrast, Rollator Ultra-Light, Vibegron, acetaminophen, apixaban, aspirin, atorvastatin, azelastine, bumetanide, carvedilol, famotidine,  fluconazole, fluocinonide, glucose blood, insulin detemir, isosorbide mononitrate, levothyroxine, linaclotide, losartan, metoprolol succinate XL, mupirocin, nitroglycerin, and nystatin    Allergies   Allergen Reactions    Vancomycin Other (See Comments)     Kidney failure per last admission       Objective   Objective     Vital Signs:   Temp:  [97.5 °F (36.4 °C)] 97.5 °F (36.4 °C)  Heart Rate:  [84-86] 86  Resp:  [16] 16  BP: (107-130)/(70-74) 110/72    Physical Exam   Constitutional: Awake, alert, wife at bedside, appears chronically ill  Eyes: PERRLA, sclerae anicteric, no conjunctival injection  HENT: NCAT, mucous membranes moist  Neck: Supple, no thyromegaly, no lymphadenopathy, trachea midline  Respiratory: Clear to auscultation bilaterally, nonlabored respirations   Cardiovascular: RRR, no murmurs, rubs, or gallops, palpable pedal pulses bilaterally  Gastrointestinal: Positive bowel sounds, soft, mild RLQ tenderness, nondistended  Musculoskeletal: 2+ BLE edema, no clubbing or cyanosis to extremities  Psychiatric: Appropriate affect, cooperative  Neurologic: Oriented x 3, strength symmetric in all extremities, Cranial Nerves grossly intact to confrontation, speech clear  Skin: Erythematous wound distal anterior right shin area with serous drainage on dressing.  Blister distal aspect of fifth metatarsal.       Result Review:  I have personally reviewed the results from the time of this admission to 4/22/2024 23:29 EDT and agree with these findings:  [x]  Laboratory list / accordion  []  Microbiology  [x]  Radiology  []  EKG/Telemetry   []  Cardiology/Vascular   []  Pathology  [x]  Old records  []  Other:  Most notable findings include: Hgn 10.1, Hct 33.5, K 5.3, BUN 77, creatinine 1.7    LAB RESULTS:      Lab 04/22/24  1506   WBC 10.31   HEMOGLOBIN 10.1*   HEMATOCRIT 33.5*   PLATELETS 108*   NEUTROS ABS 8.74*   IMMATURE GRANS (ABS) 0.05   LYMPHS ABS 0.38*   MONOS ABS 0.95*   EOS ABS 0.16   MCV 99.4*         Lab  04/22/24  1713 04/22/24  1506   SODIUM  --  137   POTASSIUM  --  5.3*   CHLORIDE  --  108*   CO2  --  17.0*   ANION GAP  --  12.0   BUN  --  77*   CREATININE  --  1.70*   EGFR  --  40.5*   GLUCOSE  --  264*   CALCIUM  --  8.8   MAGNESIUM  --  2.2   TSH 1.940  --          Lab 04/22/24  1506   TOTAL PROTEIN 6.4   ALBUMIN 3.7   GLOBULIN 2.7   ALT (SGPT) 26   AST (SGOT) 18   BILIRUBIN 0.4   ALK PHOS 80         Lab 04/22/24  1713 04/22/24  1506   HSTROP T 119* 123*             Lab 04/22/24  1506   IRON 22*  22*   IRON SATURATION (TSAT) 7*   TIBC 325   TRANSFERRIN 218   FERRITIN 309.80         Brief Urine Lab Results  (Last result in the past 365 days)        Color   Clarity   Blood   Leuk Est   Nitrite   Protein   CREAT   Urine HCG        04/22/24 1554 Yellow   Clear   Moderate (2+)   Small (1+)   Negative   30 mg/dL (1+)                 Microbiology Results (last 10 days)       ** No results found for the last 240 hours. **            XR Foot 3+ View Right    Result Date: 4/22/2024  XR FOOT 3+ VW RIGHT Date of Exam: 4/22/2024 6:54 PM EDT Indication: Diabetic foot wound. Comparison: No comparison. Findings: No definite acute osseous abnormality is noted. There is diffuse soft tissue swelling overlying the foot. Mild irregularity noted along the distal aspect of the great toe. No subcutaneous emphysema noted. Joint spaces demonstrate diffuse mild degenerative change. Soft tissue swelling is more prominent along the dorsum of the foot. Peripheral vascular calcifications are noted     Impression: Impression: Large amount of soft tissue swelling with no definite subcutaneous emphysema or destructive bone lesion. Recommend follow-up as clinically indicated Electronically Signed: Jamin Aguilar MD  4/22/2024 7:31 PM EDT  Workstation ID: [id number]    XR Chest 1 View    Result Date: 4/22/2024  XR CHEST 1 VW Date of Exam: 4/22/2024 2:49 PM EDT Indication: Weak/Dizzy/AMS triage protocol Comparison: Chest radiograph 3/9/2024  Findings: Stable cardiomegaly. Dual-chamber AICD. Median sternotomy and prior CABG. Platelike areas of atelectasis versus scar. No infectious appearing consolidation, edema, large effusion or pneumothorax. Lung volumes are borderline low. Degenerative related osseous changes.     Impression: Impression: No active pulmonary process. Stable radiographic appearance of the chest since 3/9/2024. Electronically Signed: Fran Reis MD  4/22/2024 3:15 PM EDT  Workstation ID: [id number]     Results for orders placed during the hospital encounter of 07/27/22    Adult Transthoracic Echo Complete W/ Cont if Necessary Per Protocol    Interpretation Summary  · Left ventricular ejection fraction appears to be 46 - 50%. Left ventricular systolic function is low normal.  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 Au    Chronic HFrEF (heart failure with reduced ejection fraction)    Anemia, chronic disease    Stage 3b chronic kidney disease    PAF (paroxysmal atrial fibrillation)    Jermaine Singh is a 79 y.o. male with a history of CAD, DM, HTN, HLD, PAF, PVD, RBBB, Asthma, weakness, Hypothyroidism, CKD, presents to the ED with complaints of generalized weakness.      Assessment and Plan:    Acute UTI (POA)  -- UA: Blood moderate, leukocytes small, protein 30, RBC 11-20, WBC 21-50, bacteria 2+  -Rocephin empiric antibiotics; follow urine culture      Urinary retention  BPH  Bladder wall thickening--cystoscopy on 3/22/2024 showed scattered areas of flat erythema most notable on the right lateral wall.  3-4+ trabeculation.  -- Acute urinary retention protocol  -- Flomax  -- Follows with Dr. Soto with urology    Generalized weakness  -- Fall precautions  -- PT/OT consult  -- Consult  case management    Chronic wound RLE  Blister right foot  --X-ray right foot shows large amount of soft tissue swelling with no definite subcutaneous emphysema or destructive bone lesion  -- Consult WOC in the a.m.    T2DM  -- A1c in the a.m.  -- lantus 5 units sq nightly, SSI (titrate prn)    Elevated troponin--chronically elevated (chronically elevate trop >100)  CAD s/p CABG  Parox afib/SSS/pacemaker  HTN  HLD  PVD  RBBB  Chronic HFrEF (EF 42% previous stress test)  -- Chest x-ray shows no active pulmonary process  -- Troponin 123 and 119, delta -4  -- Patient denies chest pain  -- Strict I's and O's  -- Daily weights  -- Continue oral Bumex 1 mg twice daily  -continue eliquis & coreg  -- Aspirin, atorvastatin, Coreg, Imdur, losartan      CKD 3 (baseline cr ~1.6-2.1)  Hyperkalemia  -- Creatinine 1.7, potassium 5.3  --low potassium diet  -lokelma x 1  --recheck potassium level in a.m. (if rising stop ARB and consider more aggressive iv insulin, etc)    Asthma  -currently stable, not in exacerbation  -- Chest x-ray shows no active pulmonary process  -- stable    Anemia  Thrombocytopenia  Iron def  -- Hemoglobin 10.1, hematocrit 33.5, platelets 108  -- No overt signs of bleeding; denies melena or brbpr  -iron level low; start iv iron day #1/3  -b12/folate pending  -monitor/trend hgb and plts    Hypothyroidism  --TSH 1.94; Continue levothyroxine      Am labs: cbc, bmp, A1c (f/u b12/folate, trend hgb, platelet levels, trend potassium)      DVT prophylaxis:  Eliquis    CODE STATUS:    Level Of Support Discussed With: Patient  Code Status (Patient has no pulse and is not breathing): CPR (Attempt to Resuscitate)  Medical Interventions (Patient has pulse or is breathing): Full Support      Expected Discharge  TBD  Expected discharge date/ time has not been documented.      This note has been completed as part of a split-shared workflow.     Signature: Electronically signed by DAVID Magaña, 04/22/24, 8:24 PM  EDT.           Attending   Admission Attestation       I have performed an independent face-to-face diagnostic evaluation including performing an independent physical examination.  I approve of the documented plan of care above that was reviewed and developed with the advanced practice clinician (APC) and take responsibility for that plan along with its associated risks.  I have updated the HPI as appropriate.    Brief HPI    Jermaine Singh is a 78 yo m w/ hx cad (remote cabg), parox afib/sss/pacemaker, HFrEF, DM2, PAD, previous toe amputations, chronic right foot wound, ckd 3 (baseline cr ~1.6-2.1), chronically elevated troponins, hypothyroidism. Patient presents w/ profound worsening generalized weakness and recent dysuria. Workup revealed wbc 10,310, hgb 10, plts 108,000, iron level 22, iron sat 7%, creatinine 1.7, potassium 5.3 (w/ slight hemolysis), lft's normal. U/a (straight cath) showed LE +, 11-20 rbc, 21-50 wbc, 2+ bacteria. Troponin was 123, repeat 119. Cxr negative for acute process, right foot xray soft tissue edema but no emphysema or destructive lesion noted. Initiated on rocephin and admitted to hospitalist service.     Attending Physical Exam:  Temp:  [97.5 °F (36.4 °C)] 97.5 °F (36.4 °C)  Heart Rate:  [84-86] 86  Resp:  [16] 16  BP: (107-130)/(70-74) 110/72    Constitutional:Alert, oriented x 3,appears fatigued; elderly, frail but nontoxic appearing, normal work of breathing at rest  Psych:Normal/appropriate affect  HEENT:NCAT, oropharynx clear  Neck: neck supple, full range of motion  Neuro: Face symmetric, speech clear, equal , moves all extremities  Cardiac: rrr; BLE 1+ pitting edema  Resp: CTAB, normal effort  GI: abd soft, nontender  Skin: right foot lateral plantal surface w/ blistering but no overt purulent drainage, no tunneling lesion            Result Review:  I have personally reviewed the results from the time of this admission to 4/22/2024 23:29 EDT and agree with these  findings:  [x]  Laboratory list / accordion  []  Microbiology  [x]  Radiology  [x]  EKG/Telemetry   []  Cardiology/Vascular   []  Pathology  [x]  Old records  []  Other:  Most notable findings include: see hpi and above    Assessment and Plan:    See assessment and plan documented by APC above and updated/edited by me as appropriate.    Artur Avila MD  04/22/24

## 2024-04-22 NOTE — ED NOTES
Jermaine Singh    Nursing Report ED to Floor:  Mental status: A&Ox4  Ambulatory status: bedrest, x2  Oxygen Therapy:  RA  Cardiac Rhythm: NSR  Admitted from: ED  Safety Concerns:  fall  Social Issues: none  ED Room #:  16    ED Nurse Phone Extension - [phone] or may call [phone].      HPI:   Chief Complaint   Patient presents with    Dizziness       Past Medical History:  Past Medical History:   Diagnosis Date    Allergic     Arthritis     Asthma     Coronary artery disease     Diabetes mellitus 2000    started on inuslin 12/2018; started on po meds in 2000; checking blood sugars daily     Disease of thyroid gland     po meds daily for hypothyroidism     Elevated serum creatinine 11/15/2022    Elevated troponin 10/02/2022    Generalized weakness 11/15/2022    History of fracture as a child     rt leg- severe     Hyperlipidemia     Hypertension     Hypothyroidism     PAF (paroxysmal atrial fibrillation) 07/23/2023    Peripheral neuropathy     Peripheral vascular disease     s/p angiogram 2/19-needs stent in left leg     Pleural effusion, bilateral 11/15/2022    Proximal phalanx fracture of the second digit extending into the second metatarsal joint 07/28/2022    RBBB     Right knee pain     Unstable angina 02/12/2019    Added automatically from request for surgery [id number]    Vitamin D deficiency         Past Surgical History:  Past Surgical History:   Procedure Laterality Date    AMPUTATION DIGIT Left 01/17/2023    Procedure: AMPUTATION DIGIT LEFT;  Surgeon: Gopal Márquez MD;  Location: [facility] OR;  Service: Vascular;  Laterality: Left;    APPENDECTOMY      CARDIAC CATHETERIZATION N/A 02/14/2019    Procedure: Left Heart Cath;  Surgeon: Cooper Apodaca MD;  Location:  [facility] CATH INVASIVE LOCATION;  Service: Cardiology    CARDIAC ELECTROPHYSIOLOGY PROCEDURE N/A 05/18/2022    Procedure: DEVICE IMPLANT;  Surgeon: Cooper Apodaca MD;  Location:  [facility] CATH INVASIVE LOCATION;  Service: Cardiology;  Laterality:  "N/A;    CARDIAC SURGERY      COLONOSCOPY      CORONARY ARTERY BYPASS GRAFT N/A 03/22/2019    Procedure: MEDIAN STERNOTOMY, CORONARY ARTERY BYPASS GRAFT X3, UTILIZING THE LEFT INTERNAL MAMMARY ARTERY, EVH AND OPEN HARVEST OF THE RIGHT GREATER SAPHENOUS VEIN, EXPLORATION OF THE LEFT LEG;  Surgeon: Ap Au MD;  Location: [facility] OR;  Service: Cardiothoracic    EYE SURGERY Bilateral     cataracts     FRACTURE SURGERY      INTERVENTIONAL RADIOLOGY PROCEDURE N/A 07/29/2021    Procedure: Abdominal Aortagram with Runoff;  Surgeon: Jermaine Hernandez MD;  Location: [facility] CATH INVASIVE LOCATION;  Service: Cardiovascular;  Laterality: N/A;    KNEE ARTHROSCOPY      right x 2, left x 1    LACERATION REPAIR      right leg    LEG SURGERY      2 for fracture of rt leg     TONSILLECTOMY      Adnoidectomy    TRANS METATARSAL AMPUTATION Left 08/02/2022    Procedure: GREAT TOE AMPUTATION LEFT;  Surgeon: Gopal Márquez MD;  Location: [facility] OR;  Service: Vascular;  Laterality: Left;        Admitting Doctor:   Artur Avila MD    Consulting Provider(s):  Consults       No orders found from 3/24/2024 to 4/23/2024.             Admitting Diagnosis:   The primary encounter diagnosis was Acute UTI. Diagnoses of Generalized weakness, Edema of right lower extremity, Leg erythema, Chronic kidney disease, unspecified CKD stage, Elevated troponin, and Hyperkalemia were also pertinent to this visit.    Most Recent Vitals:   Vitals:    04/22/24 1438 04/22/24 1529   BP: 130/70 107/74   Pulse: 84 85   Resp: 16    Temp: 97.5 °F (36.4 °C)    TempSrc: Oral    SpO2: 99% 99%   Weight: 77.1 kg (170 lb)    Height: 190.5 cm (75\")        Active LDAs/IV Access:   Lines, Drains & Airways       Active LDAs       Name Placement date Placement time Site Days    Peripheral IV 03/09/24 0920 Anterior;Left Forearm 03/09/24  0920  Forearm  44    Peripheral IV 04/22/24 1437 Anterior;Left Hand 04/22/24  1437  Hand  less than 1                    Labs " (abnormal labs have a star):   Labs Reviewed   COMPREHENSIVE METABOLIC PANEL - Abnormal; Notable for the following components:       Result Value    Glucose 264 (*)     BUN 77 (*)     Creatinine 1.70 (*)     Potassium 5.3 (*)     Chloride 108 (*)     CO2 17.0 (*)     BUN/Creatinine Ratio 45.3 (*)     eGFR 40.5 (*)     All other components within normal limits    Narrative:     GFR Normal >60  Chronic Kidney Disease <60  Kidney Failure <15    The GFR formula is only valid for adults with stable renal function between ages 18 and 70.   SINGLE HS TROPONIN T - Abnormal; Notable for the following components:    HS Troponin T 123 (*)     All other components within normal limits    Narrative:     High Sensitive Troponin T Reference Range:  <14.0 ng/L- Negative Female for AMI  <22.0 ng/L- Negative Male for AMI  >=14 - Abnormal Female indicating possible myocardial injury.  >=22 - Abnormal Male indicating possible myocardial injury.   Clinicians would have to utilize clinical acumen, EKG, Troponin, and serial changes to determine if it is an Acute Myocardial Infarction or myocardial injury due to an underlying chronic condition.        URINALYSIS W/ MICROSCOPIC IF INDICATED (NO CULTURE) - Abnormal; Notable for the following components:    Blood, UA Moderate (2+) (*)     Protein, UA 30 mg/dL (1+) (*)     Leuk Esterase, UA Small (1+) (*)     All other components within normal limits   CBC WITH AUTO DIFFERENTIAL - Abnormal; Notable for the following components:    RBC 3.37 (*)     Hemoglobin 10.1 (*)     Hematocrit 33.5 (*)     MCV 99.4 (*)     MCHC 30.1 (*)     MPV 12.2 (*)     Platelets 108 (*)     Neutrophil % 84.7 (*)     Lymphocyte % 3.7 (*)     Neutrophils, Absolute 8.74 (*)     Lymphocytes, Absolute 0.38 (*)     Monocytes, Absolute 0.95 (*)     All other components within normal limits   HIGH SENSITIVITIY TROPONIN T 2HR - Abnormal; Notable for the following components:    HS Troponin T 119 (*)     Troponin T Delta -4  (*)     All other components within normal limits    Narrative:     High Sensitive Troponin T Reference Range:  <14.0 ng/L- Negative Female for AMI  <22.0 ng/L- Negative Male for AMI  >=14 - Abnormal Female indicating possible myocardial injury.  >=22 - Abnormal Male indicating possible myocardial injury.   Clinicians would have to utilize clinical acumen, EKG, Troponin, and serial changes to determine if it is an Acute Myocardial Infarction or myocardial injury due to an underlying chronic condition.        URINALYSIS, MICROSCOPIC ONLY - Abnormal; Notable for the following components:    RBC, UA 11-20 (*)     WBC, UA 21-50 (*)     Bacteria, UA 2+ (*)     All other components within normal limits   MAGNESIUM - Normal   RAINBOW DRAW    Narrative:     The following orders were created for panel order [place] Draw.  Procedure                               Abnormality         Status                     ---------                               -----------         ------                     Green Top (Gel)[[id number]]                                  Final result               Lavender Top[[id number]]                                     Final result               Gold Top - SST[[id number]]                                                              [place] Top[[id number]]                                         In process                 Light Blue Top[[id number]]                                                                Please view results for these tests on the individual orders.   VITAMIN B12   FOLATE   TSH   POCT GLUCOSE FINGERSTICK   POCT GLUCOSE FINGERSTICK   POCT GLUCOSE FINGERSTICK   POCT GLUCOSE FINGERSTICK   CBC AND DIFFERENTIAL    Narrative:     The following orders were created for panel order CBC & Differential.  Procedure                               Abnormality         Status                     ---------                               -----------         ------                     CBC Auto  Differential[[id number]]        Abnormal            Final result                 Please view results for these tests on the individual orders.   GREEN TOP   LAVENDER TOP   GOLD TOP - SST   GRAY TOP   LIGHT BLUE TOP       Meds Given in ED:   Medications   sodium chloride 0.9 % flush 10 mL (has no administration in time range)   cefTRIAXone (ROCEPHIN) 1,000 mg in sodium chloride 0.9 % 100 mL MBP (has no administration in time range)   Magnesium Low Dose Replacement - Follow Nurse / BPA Driven Protocol (has no administration in time range)   dextrose (GLUTOSE) oral gel 15 g (has no administration in time range)   dextrose (D50W) (25 g/50 mL) IV injection 25 g (has no administration in time range)   glucagon (GLUCAGEN) injection 1 mg (has no administration in time range)   Insulin Lispro (humaLOG) injection 2-9 Units (has no administration in time range)           Last NIH score:                                                          Dysphagia screening results:  Patient Factors Component (Dysphagia:Stroke or Rule-out)  Best Eye Response: 4-->(E4) spontaneous (04/22/24 1438)  Best Motor Response: 6-->(M6) obeys commands (04/22/24 1438)  Best Verbal Response: 5-->(V5) oriented (04/22/24 1438)  [place] Coma Scale Score: 15 (04/22/24 1438)     [place] Coma Scale:  No data recorded     CIWA:        Restraint Type:            Isolation Status:  No active isolations

## 2024-04-22 NOTE — TELEPHONE ENCOUNTER
Name: HIPOLITO DAWN    Relationship: Emergency Contact    Best Callback Number: [phone]     [facility] PROVIDED THE RELAY MESSAGE FROM THE OFFICE   PATIENT VOICED UNDERSTANDING AND HAS NO FURTHER QUESTIONS AT THIS TIME    ADDITIONAL INFORMATION:

## 2024-04-22 NOTE — TELEPHONE ENCOUNTER
Left detailed message that the referral was sent to Eyad Velasco. Ok for HUB to relay if she calls back.

## 2024-04-23 LAB
ANION GAP SERPL CALCULATED.3IONS-SCNC: 17 MMOL/L (ref 5–15)
BACTERIA SPEC AEROBE CULT: NO GROWTH
BASOPHILS # BLD AUTO: 0.02 10*3/MM3 (ref 0–0.2)
BASOPHILS NFR BLD AUTO: 0.2 % (ref 0–1.5)
BUN SERPL-MCNC: 77 MG/DL (ref 8–23)
BUN/CREAT SERPL: 41.6 (ref 7–25)
CALCIUM SPEC-SCNC: 8.7 MG/DL (ref 8.6–10.5)
CHLORIDE SERPL-SCNC: 107 MMOL/L (ref 98–107)
CO2 SERPL-SCNC: 14 MMOL/L (ref 22–29)
CREAT SERPL-MCNC: 1.85 MG/DL (ref 0.76–1.27)
DEPRECATED RDW RBC AUTO: 46.5 FL (ref 37–54)
EGFRCR SERPLBLD CKD-EPI 2021: 36.6 ML/MIN/1.73
EOSINOPHIL # BLD AUTO: 0.01 10*3/MM3 (ref 0–0.4)
EOSINOPHIL NFR BLD AUTO: 0.1 % (ref 0.3–6.2)
ERYTHROCYTE [DISTWIDTH] IN BLOOD BY AUTOMATED COUNT: 13.2 % (ref 12.3–15.4)
FOLATE SERPL-MCNC: 11.4 NG/ML (ref 4.78–24.2)
GLUCOSE BLDC GLUCOMTR-MCNC: 224 MG/DL (ref 70–130)
GLUCOSE BLDC GLUCOMTR-MCNC: 257 MG/DL (ref 70–130)
GLUCOSE BLDC GLUCOMTR-MCNC: 271 MG/DL (ref 70–130)
GLUCOSE BLDC GLUCOMTR-MCNC: 304 MG/DL (ref 70–130)
GLUCOSE SERPL-MCNC: 278 MG/DL (ref 65–99)
HBA1C MFR BLD: 8.5 % (ref 4.8–5.6)
HCT VFR BLD AUTO: 31.3 % (ref 37.5–51)
HGB BLD-MCNC: 9.8 G/DL (ref 13–17.7)
IMM GRANULOCYTES # BLD AUTO: 0.09 10*3/MM3 (ref 0–0.05)
IMM GRANULOCYTES NFR BLD AUTO: 0.8 % (ref 0–0.5)
LYMPHOCYTES # BLD AUTO: 0.37 10*3/MM3 (ref 0.7–3.1)
LYMPHOCYTES NFR BLD AUTO: 3.1 % (ref 19.6–45.3)
MCH RBC QN AUTO: 29.9 PG (ref 26.6–33)
MCHC RBC AUTO-ENTMCNC: 31.3 G/DL (ref 31.5–35.7)
MCV RBC AUTO: 95.4 FL (ref 79–97)
MONOCYTES # BLD AUTO: 0.83 10*3/MM3 (ref 0.1–0.9)
MONOCYTES NFR BLD AUTO: 7 % (ref 5–12)
NEUTROPHILS NFR BLD AUTO: 10.51 10*3/MM3 (ref 1.7–7)
NEUTROPHILS NFR BLD AUTO: 88.8 % (ref 42.7–76)
NRBC BLD AUTO-RTO: 0 /100 WBC (ref 0–0.2)
PLATELET # BLD AUTO: 93 10*3/MM3 (ref 140–450)
PMV BLD AUTO: 12.7 FL (ref 6–12)
POTASSIUM SERPL-SCNC: 4.9 MMOL/L (ref 3.5–5.2)
RBC # BLD AUTO: 3.28 10*6/MM3 (ref 4.14–5.8)
SODIUM SERPL-SCNC: 138 MMOL/L (ref 136–145)
VIT B12 BLD-MCNC: 704 PG/ML (ref 211–946)
WBC NRBC COR # BLD AUTO: 11.83 10*3/MM3 (ref 3.4–10.8)

## 2024-04-23 PROCEDURE — 83036 HEMOGLOBIN GLYCOSYLATED A1C: CPT | Performed by: NURSE PRACTITIONER

## 2024-04-23 PROCEDURE — 80048 BASIC METABOLIC PNL TOTAL CA: CPT | Performed by: NURSE PRACTITIONER

## 2024-04-23 PROCEDURE — 82948 REAGENT STRIP/BLOOD GLUCOSE: CPT

## 2024-04-23 PROCEDURE — 97166 OT EVAL MOD COMPLEX 45 MIN: CPT

## 2024-04-23 PROCEDURE — 25810000003 SODIUM CHLORIDE 0.9 % SOLUTION: Performed by: NURSE PRACTITIONER

## 2024-04-23 PROCEDURE — 97161 PT EVAL LOW COMPLEX 20 MIN: CPT

## 2024-04-23 PROCEDURE — 25010000002 CEFTRIAXONE PER 250 MG: Performed by: NURSE PRACTITIONER

## 2024-04-23 PROCEDURE — 25010000002 NA FERRIC GLUC CPLX PER 12.5 MG: Performed by: INTERNAL MEDICINE

## 2024-04-23 PROCEDURE — 63710000001 INSULIN LISPRO (HUMAN) PER 5 UNITS: Performed by: FAMILY MEDICINE

## 2024-04-23 PROCEDURE — 63710000001 INSULIN LISPRO (HUMAN) PER 5 UNITS: Performed by: NURSE PRACTITIONER

## 2024-04-23 PROCEDURE — 99233 SBSQ HOSP IP/OBS HIGH 50: CPT | Performed by: FAMILY MEDICINE

## 2024-04-23 PROCEDURE — 63710000001 INSULIN GLARGINE PER 5 UNITS: Performed by: FAMILY MEDICINE

## 2024-04-23 PROCEDURE — 85025 COMPLETE CBC W/AUTO DIFF WBC: CPT | Performed by: NURSE PRACTITIONER

## 2024-04-23 RX ORDER — INSULIN LISPRO 100 [IU]/ML
5 INJECTION, SOLUTION INTRAVENOUS; SUBCUTANEOUS
Status: DISCONTINUED | OUTPATIENT
Start: 2024-04-23 | End: 2024-04-24

## 2024-04-23 RX ADMIN — INSULIN LISPRO 4 UNITS: 100 INJECTION, SOLUTION INTRAVENOUS; SUBCUTANEOUS at 20:44

## 2024-04-23 RX ADMIN — INSULIN LISPRO 7 UNITS: 100 INJECTION, SOLUTION INTRAVENOUS; SUBCUTANEOUS at 12:16

## 2024-04-23 RX ADMIN — SODIUM CHLORIDE 500 ML: 9 INJECTION, SOLUTION INTRAVENOUS at 05:55

## 2024-04-23 RX ADMIN — Medication 10 ML: at 20:45

## 2024-04-23 RX ADMIN — POLYETHYLENE GLYCOL 3350 17 G: 17 POWDER, FOR SOLUTION ORAL at 09:13

## 2024-04-23 RX ADMIN — APIXABAN 5 MG: 5 TABLET, FILM COATED ORAL at 20:44

## 2024-04-23 RX ADMIN — TAMSULOSIN HYDROCHLORIDE 0.4 MG: 0.4 CAPSULE ORAL at 09:01

## 2024-04-23 RX ADMIN — SODIUM CHLORIDE 125 MG: 9 INJECTION, SOLUTION INTRAVENOUS at 09:32

## 2024-04-23 RX ADMIN — Medication 10 ML: at 09:06

## 2024-04-23 RX ADMIN — INSULIN GLARGINE 10 UNITS: 100 INJECTION, SOLUTION SUBCUTANEOUS at 20:44

## 2024-04-23 RX ADMIN — APIXABAN 5 MG: 5 TABLET, FILM COATED ORAL at 09:01

## 2024-04-23 RX ADMIN — ASPIRIN 81 MG CHEWABLE TABLET 81 MG: 81 TABLET CHEWABLE at 09:02

## 2024-04-23 RX ADMIN — SODIUM CHLORIDE 1000 MG: 900 INJECTION INTRAVENOUS at 12:16

## 2024-04-23 RX ADMIN — LEVOTHYROXINE SODIUM 88 MCG: 88 TABLET ORAL at 09:32

## 2024-04-23 RX ADMIN — Medication 1 CAPSULE: at 09:01

## 2024-04-23 RX ADMIN — INSULIN LISPRO 5 UNITS: 100 INJECTION, SOLUTION INTRAVENOUS; SUBCUTANEOUS at 17:40

## 2024-04-23 RX ADMIN — INSULIN LISPRO 6 UNITS: 100 INJECTION, SOLUTION INTRAVENOUS; SUBCUTANEOUS at 17:40

## 2024-04-23 RX ADMIN — ATORVASTATIN CALCIUM 20 MG: 20 TABLET, FILM COATED ORAL at 20:44

## 2024-04-23 NOTE — THERAPY EVALUATION
Patient Name: Jermaine Singh  : 1945    MRN: [id number]                              Today's Date: 2024       Admit Date: 2024    Visit Dx:     ICD-10-CM ICD-9-CM   1. Acute UTI  N39.0 599.0   2. Generalized weakness  R53.1 780.79   3. Edema of right lower extremity  R60.0 782.3   4. Leg erythema  L53.9 695.9   5. Chronic kidney disease, unspecified CKD stage  N18.9 585.9   6. Elevated troponin  R79.89 790.6   7. 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 Au    Atherosclerosis of native artery of both lower extremities with intermittent claudication    SSS (sick sinus syndrome)    Chronic HFrEF (heart failure with reduced ejection fraction)    DVT, bilateral lower limbs    Cellulitis of right lower extremity    Anemia, chronic disease    PVD (peripheral vascular disease)    Status post amputation of great toe, left    Diabetic foot ulcer    GERD without esophagitis    Stage 3b chronic kidney disease    Arthralgia of shoulder    Right inguinal hernia    Diabetic foot infection    Status post amputation of great toe and second toe of left foot    PAF (paroxysmal atrial fibrillation)    Moderate malnutrition    UTI (urinary tract infection)     Past Medical History:   Diagnosis Date    Allergic     Arthritis     Asthma     Coronary artery disease     Diabetes mellitus 2000    started on inuslin 2018; started on po meds in ; checking blood sugars daily     Disease of thyroid gland     po meds daily for hypothyroidism     Elevated serum creatinine 11/15/2022    Elevated troponin 10/02/2022    Generalized weakness 11/15/2022    History of fracture as a child     rt leg- severe     Hyperlipidemia     Hypertension     Hypothyroidism     PAF (paroxysmal atrial fibrillation)  07/23/2023    Peripheral neuropathy     Peripheral vascular disease     s/p angiogram 2/19-needs stent in left leg     Pleural effusion, bilateral 11/15/2022    Proximal phalanx fracture of the second digit extending into the second metatarsal joint 07/28/2022    RBBB     Right knee pain     Unstable angina 02/12/2019    Added automatically from request for surgery [id number]    Vitamin D deficiency      Past Surgical History:   Procedure Laterality Date    AMPUTATION DIGIT Left 01/17/2023    Procedure: AMPUTATION DIGIT LEFT;  Surgeon: Gopal Márquez MD;  Location:  [facility];  Service: Vascular;  Laterality: Left;    APPENDECTOMY      CARDIAC CATHETERIZATION N/A 02/14/2019    Procedure: Left Heart Cath;  Surgeon: Cooper Apodaca MD;  Location:  [facility];  Service: Cardiology    CARDIAC ELECTROPHYSIOLOGY PROCEDURE N/A 05/18/2022    Procedure: DEVICE IMPLANT;  Surgeon: Cooper Apodaca MD;  Location:  [facility];  Service: Cardiology;  Laterality: N/A;    CARDIAC SURGERY      COLONOSCOPY      CORONARY ARTERY BYPASS GRAFT N/A 03/22/2019    Procedure: MEDIAN STERNOTOMY, CORONARY ARTERY BYPASS GRAFT X3, UTILIZING THE LEFT INTERNAL MAMMARY ARTERY, EVH AND OPEN HARVEST OF THE RIGHT GREATER SAPHENOUS VEIN, EXPLORATION OF THE LEFT LEG;  Surgeon: Ap Au MD;  Location:  [facility];  Service: Cardiothoracic    EYE SURGERY Bilateral     cataracts     FRACTURE SURGERY      INTERVENTIONAL RADIOLOGY PROCEDURE N/A 07/29/2021    Procedure: Abdominal Aortagram with Runoff;  Surgeon: Jermaine Hernandez MD;  Location:  [facility];  Service: Cardiovascular;  Laterality: N/A;    KNEE ARTHROSCOPY      right x 2, left x 1    LACERATION REPAIR      right leg    LEG SURGERY      2 for fracture of rt leg     TONSILLECTOMY      Adnoidectomy    TRANS METATARSAL AMPUTATION Left 08/02/2022    Procedure: GREAT TOE AMPUTATION LEFT;  Surgeon: Gopal Márquez MD;  Location:   [facility];  Service: Vascular;  Laterality: Left;      General Information       Row Name 04/23/24 1114          Physical Therapy Time and Intention    Document Type evaluation  -KR     Mode of Treatment physical therapy  -KR       Row Name 04/23/24 1114          General Information    Patient Profile Reviewed yes  -KR     Prior Level of Function independent:;gait;transfer;mod assist:;ADL's;dependent:;w/c or scooter  ambulates short distances in the home with FWW, depA for manual wc propulsion in community. Currently receiving  PT/OT.  -KR     Existing Precautions/Restrictions fall  -KR     Barriers to Rehab medically complex;previous functional deficit  -KR       Row Name 04/23/24 1114          Living Environment    People in Home spouse  -KR       Row Name 04/23/24 1114          Home Main Entrance    Number of Stairs, Main Entrance four  -KR     Stair Railings, Main Entrance railings on both sides of stairs  -KR       Row Name 04/23/24 1114          Stairs Within Home, Primary    Number of Stairs, Within Home, Primary none  -KR       Row Name 04/23/24 1114          Cognition    Orientation Status (Cognition) oriented x 3  -KR       Row Name 04/23/24 1114          Safety Issues, Functional Mobility    Safety Issues Affecting Function (Mobility) insight into deficits/self-awareness;sequencing abilities  -KR     Impairments Affecting Function (Mobility) balance;endurance/activity tolerance;strength  -KR               User Key  (r) = Recorded By, (t) = Taken By, (c) = Cosigned By      Initials Name Provider Type    KR Tiffanie Erwin, PT Physical Therapist                   Mobility       Row Name 04/23/24 1115          Sit-Stand Transfer    Sit-Stand [place] (Transfers) minimum assist (75% patient effort);2 person assist  -KR     Assistive Device (Sit-Stand Transfers) walker, front-wheeled  -KR     Comment, (Sit-Stand Transfer) 1x from chair with cues for UE push up to stand and controlled lowering to sit.  -KR        Row Name 04/23/24 1115          Gait/Stairs (Locomotion)    [place] Level (Gait) minimum assist (75% patient effort);1 person assist;1 person to manage equipment  -KR     Assistive Device (Gait) walker, front-wheeled  -KR     Distance in Feet (Gait) 8  -KR     Deviations/Abnormal Patterns (Gait) ashli decreased;gait speed decreased;stride length decreased;weight shifting decreased;bilateral deviations  -KR     Bilateral Gait Deviations forward flexed posture;heel strike decreased  -KR     Comment, (Gait/Stairs) chair follow by 2nd person for safety. Farther distance limited by fatigue/weakness.  -KR               User Key  (r) = Recorded By, (t) = Taken By, (c) = Cosigned By      Initials Name Provider Type    Tiffanie Sánchez PT Physical Therapist                   Obj/Interventions       Row Name 04/23/24 1117          Range of Motion Comprehensive    General Range of Motion lower extremity range of motion deficits identified  -KR     Comment, General Range of Motion B knee extension limited by ~10 degrees  -KR       Row Name 04/23/24 1117          Strength Comprehensive (MMT)    General Manual Muscle Testing (MMT) Assessment lower extremity strength deficits identified  -KR     Comment, General Manual Muscle Testing (MMT) Assessment BLEs grossly 4-/5 within available ROM  -KR       Row Name 04/23/24 1117          Balance    Balance Assessment sitting static balance;sitting dynamic balance;standing static balance;standing dynamic balance  -KR     Static Sitting Balance standby assist  -KR     Dynamic Sitting Balance contact guard  -KR     Position, Sitting Balance unsupported;sitting in chair  -KR     Static Standing Balance contact guard  -KR     Dynamic Standing Balance minimal assist;1-person assist;verbal cues;non-verbal cues (demo/gesture)  -KR     Position/Device Used, Standing Balance supported;walker, front-wheeled  -KR     Balance Interventions sitting;standing;sit to  stand;supported;static;dynamic  -KR       Row Name 04/23/24 1117          Sensory Assessment (Somatosensory)    Sensory Assessment (Somatosensory) LE sensation intact  -KR               User Key  (r) = Recorded By, (t) = Taken By, (c) = Cosigned By      Initials Name Provider Type    Tiffanie Sánchez, PT Physical Therapist                   Goals/Plan       Row Name 04/23/24 1118          Bed Mobility Goal 1 (PT)    Activity/Assistive Device (Bed Mobility Goal 1, PT) bed mobility activities, all  -KR     [place] Level/Cues Needed (Bed Mobility Goal 1, PT) independent  -KR     Time Frame (Bed Mobility Goal 1, PT) long term goal (LTG);2 weeks  -KR       Row Name 04/23/24 1118          Transfer Goal 1 (PT)    Activity/Assistive Device (Transfer Goal 1, PT) sit-to-stand/stand-to-sit;bed-to-chair/chair-to-bed;walker, rolling  -KR     [place] Level/Cues Needed (Transfer Goal 1, PT) modified independence  -KR     Time Frame (Transfer Goal 1, PT) long term goal (LTG);2 weeks  -KR       Row Name 04/23/24 1118          Gait Training Goal 1 (PT)    Activity/Assistive Device (Gait Training Goal 1, PT) gait (walking locomotion);improve balance and speed;increase endurance/gait distance;assistive device use;walker, rolling  -KR     [place] Level (Gait Training Goal 1, PT) standby assist  -KR     Distance (Gait Training Goal 1, PT) 50  -KR     Time Frame (Gait Training Goal 1, PT) long term goal (LTG);2 weeks  -KR       Row Name 04/23/24 1118          Stairs Goal 1 (PT)    Activity/Assistive Device (Stairs Goal 1, PT) stairs, all skills;using handrail, left;using handrail, right  -KR     [place] Level/Cues Needed (Stairs Goal 1, PT) contact guard required  -KR     Number of Stairs (Stairs Goal 1, PT) 4  -KR     Time Frame (Stairs Goal 1, PT) long term goal (LTG);2 weeks  -KR       Row Name 04/23/24 1118          Therapy Assessment/Plan (PT)    Planned Therapy Interventions (PT) balance training;bed mobility  training;gait training;home exercise program;ROM (range of motion);stair training;strengthening;stretching;transfer training;postural re-education;patient/family education  -KR               User Key  (r) = Recorded By, (t) = Taken By, (c) = Cosigned By      Initials Name Provider Type    Tiffanie Sánchez, PT Physical Therapist                   Clinical Impression       Row Name 04/23/24 1117          Pain    Additional Documentation Pain Scale: FACES Pre/Post-Treatment (Group)  -KR       [facility] Name 04/23/24 1117          Pain Scale: FACES Pre/Post-Treatment    Pain: FACES Scale, Pretreatment 0-->no hurt  -KR     Posttreatment Pain Rating 0-->no hurt  -KR       Row Name 04/23/24 1117          Plan of Care Review    Plan of Care Reviewed With patient;spouse  -KR     Outcome Evaluation Pt presents with strength, balance, and endurance below baseline contributing to bed mobility, transfer, and ambulation deficits. Pt will benefit from PT to address aforementioned deficits and return to PLOF. PT rec IRF upon dc.  -KR       [facility] Name 04/23/24 1117          Therapy Assessment/Plan (PT)    Rehab Potential (PT) good, to achieve stated therapy goals  -KR     Criteria for Skilled Interventions Met (PT) yes;skilled treatment is necessary  -KR     Therapy Frequency (PT) daily  -KR       Row Name 04/23/24 1117          Vital Signs    Pre Systolic BP Rehab 95  -KR     Pre Treatment Diastolic BP 63  -KR     Post Systolic BP Rehab 100  -KR     Post Treatment Diastolic BP 69  -KR     Pre Patient Position Sitting  -KR     Intra Patient Position Standing  -KR     Post Patient Position Sitting  -KR       [facility] Name 04/23/24 1117          Positioning and Restraints    Pre-Treatment Position sitting in chair/recliner  -KR     Post Treatment Position chair  -KR     In Chair notified nsg;reclined;call light within reach;encouraged to call for assist;exit alarm on;with family/caregiver;waffle cushion;on mechanical lift sling;legs elevated   -KR               User Key  (r) = Recorded By, (t) = Taken By, (c) = Cosigned By      Initials Name Provider Type    Tiffanie Sánchez PT Physical Therapist                   Outcome Measures       Row Name 04/23/24 1119          How much help from another person do you currently need...    Turning from your back to your side while in flat bed without using bedrails? 3  -KR     Moving from lying on back to sitting on the side of a flat bed without bedrails? 2  -KR     Moving to and from a bed to a chair (including a wheelchair)? 3  -KR     Standing up from a chair using your arms (e.g., wheelchair, bedside chair)? 3  -KR     Climbing 3-5 steps with a railing? 2  -KR     To walk in hospital room? 3  -KR     AM-PAC 6 Clicks Score (PT) 16  -KR     Highest Level of Mobility Goal 5 --> Static standing  -KR       Row Name 04/23/24 1119          Modified Hooker Scale    Pre-Stroke Modified Hooker Scale 6 - Unable to determine (UTD) from the medical record documentation  -KR     Modified Deena Scale 4 - Moderately severe disability.  Unable to walk without assistance, and unable to attend to own bodily needs without assistance.  -KR       Row Name 04/23/24 1119          Functional Assessment    Outcome Measure Options Modified [place]er  -KR               User Key  (r) = Recorded By, (t) = Taken By, (c) = Cosigned By      Initials Name Provider Type    Tiffanie Sánchez PT Physical Therapist                                 Physical Therapy Education       Title: PT OT SLP Therapies (In Progress)       Topic: Physical Therapy (In Progress)       Point: Mobility training (Done)       Learning Progress Summary             Patient Acceptance, E, VU by ELENITA at 4/23/2024 1120   Family Acceptance, E, VU by ELENITA at 4/23/2024 1120                         Point: Home exercise program (Not Started)       Learner Progress:  Not documented in this visit.              Point: Body mechanics (Done)       Learning Progress Summary              Patient Acceptance, E, VU by KR at 4/23/2024 1120   Family Acceptance, E, VU by KR at 4/23/2024 1120                         Point: Precautions (Done)       Learning Progress Summary             Patient Acceptance, E, VU by KR at 4/23/2024 1120   Family Acceptance, E, VU by KR at 4/23/2024 1120                                         User Key       Initials Effective Dates Name Provider Type Discipline     12/30/22 -  Tiffanie Erwin, SETH Physical Therapist PT                  PT Recommendation and Plan  Planned Therapy Interventions (PT): balance training, bed mobility training, gait training, home exercise program, ROM (range of motion), stair training, strengthening, stretching, transfer training, postural re-education, patient/family education  Plan of Care Reviewed With: patient, spouse  Outcome Evaluation: Pt presents with strength, balance, and endurance below baseline contributing to bed mobility, transfer, and ambulation deficits. Pt will benefit from PT to address aforementioned deficits and return to PLOF. PT rec IRF upon dc.     Time Calculation:   PT Evaluation Complexity  History, PT Evaluation Complexity: 3 or more personal factors and/or comorbidities  Examination of Body Systems (PT Eval Complexity): total of 4 or more elements  Clinical Presentation (PT Evaluation Complexity): stable  Clinical Decision Making (PT Evaluation Complexity): low complexity  Overall Complexity (PT Evaluation Complexity): low complexity     PT Charges       Row Name 04/23/24 1120             Time Calculation    Start Time 1043  -KR      PT Received On 04/23/24  -KR      PT Goal Re-Cert Due Date 05/03/24  -KR         Untimed Charges    PT Eval/Re-eval Minutes 48  -KR         Total Minutes    Untimed Charges Total Minutes 48  -KR       Total Minutes 48  -KR                User Key  (r) = Recorded By, (t) = Taken By, (c) = Cosigned By      Initials Name Provider Type    Tiffanie Sánchez, PT Physical Therapist                   Therapy Charges for Today       Code Description Service Date Service Provider Modifiers Qty    14638857346 HC PT EVAL LOW COMPLEXITY 4 4/23/2024 Tiffanie Erwin, PT GP 1            PT G-Codes  Outcome Measure Options: Modified Deena  AM-PAC 6 Clicks Score (PT): 16  Modified [place] Scale: 4 - Moderately severe disability.  Unable to walk without assistance, and unable to attend to own bodily needs without assistance.  PT Discharge Summary  Anticipated Discharge Disposition (PT): inpatient rehabilitation facility    Tiffanie Erwin, SETH  4/23/2024

## 2024-04-23 NOTE — CASE MANAGEMENT/SOCIAL WORK
Discharge Planning Assessment  [facility]     Patient Name: Jermaine Singh  MRN: [id number]  Today's Date: 4/23/2024    Admit Date: 4/22/2024    Plan: Home   Discharge Needs Assessment       Row Name 04/23/24 1423       Living Environment    People in Home spouse    Current Living Arrangements home    Primary Care Provided by self    Provides Primary Care For no one    Family Caregiver if Needed spouse    Quality of Family Relationships supportive    Able to Return to Prior Arrangements yes       Transition Planning    Patient/Family Anticipates Transition to home    Transportation Anticipated family or friend will provide       Discharge Needs Assessment    Readmission Within the Last 30 Days no previous admission in last 30 days    Equipment Currently Used at Home walker, rolling;lift device                   Discharge Plan       Row Name 04/23/24 1425       Plan    Plan Home    Patient/Family in Agreement with Plan yes    Plan Comments Spoke with Mr. Singh at the bedside. He lives with his Spouse in [place]. He is independent with ADL's. He uses a lift device and rolling walker. He does not use oxygen. He is current with [facility] for SN, PT, OT and Social work. He has advance directives. His PCP is Marysol Shrestha and he has Medicare and [employer] insurance. Discharge plan is home. CM will continue to follow for discharge needs.    Final Discharge Disposition Code 01 - home or self-care                  Continued Care and Services - Admitted Since 4/22/2024    No active coordination exists for this encounter.       Expected Discharge Date and Time       Expected Discharge Date Expected Discharge Time    Apr 25, 2024            Demographic Summary       Row Name 04/23/24 1422       General Information    Admission Type inpatient    Arrived From home    Referral Source admission list    Reason for Consult discharge planning    Preferred Language English       Contact Information    Permission  Granted to Share Info With                    Functional Status       Row Name 04/23/24 1423       Functional Status, IADL    Medications independent    Meal Preparation independent    Housekeeping independent    Laundry independent    Shopping independent       Mental Status    General Appearance WDL WDL                   Psychosocial    No documentation.                  Abuse/Neglect    No documentation.                  Legal    No documentation.                  Substance Abuse    No documentation.                  Patient Forms    No documentation.                     Tiana Tovar RN

## 2024-04-23 NOTE — THERAPY EVALUATION
Patient Name: Jermaine Singh  : 1945    MRN: [id number]                              Today's Date: 2024       Admit Date: 2024    Visit Dx:     ICD-10-CM ICD-9-CM   1. Acute UTI  N39.0 599.0   2. Generalized weakness  R53.1 780.79   3. Edema of right lower extremity  R60.0 782.3   4. Leg erythema  L53.9 695.9   5. Chronic kidney disease, unspecified CKD stage  N18.9 585.9   6. Elevated troponin  R79.89 790.6   7. 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 Au    Atherosclerosis of native artery of both lower extremities with intermittent claudication    SSS (sick sinus syndrome)    Chronic HFrEF (heart failure with reduced ejection fraction)    DVT, bilateral lower limbs    Cellulitis of right lower extremity    Anemia, chronic disease    PVD (peripheral vascular disease)    Status post amputation of great toe, left    Diabetic foot ulcer    GERD without esophagitis    Stage 3b chronic kidney disease    Arthralgia of shoulder    Right inguinal hernia    Diabetic foot infection    Status post amputation of great toe and second toe of left foot    PAF (paroxysmal atrial fibrillation)    Moderate malnutrition    UTI (urinary tract infection)     Past Medical History:   Diagnosis Date    Allergic     Arthritis     Asthma     Coronary artery disease     Diabetes mellitus 2000    started on inuslin 2018; started on po meds in ; checking blood sugars daily     Disease of thyroid gland     po meds daily for hypothyroidism     Elevated serum creatinine 11/15/2022    Elevated troponin 10/02/2022    Generalized weakness 11/15/2022    History of fracture as a child     rt leg- severe     Hyperlipidemia     Hypertension     Hypothyroidism     PAF (paroxysmal atrial fibrillation)  07/23/2023    Peripheral neuropathy     Peripheral vascular disease     s/p angiogram 2/19-needs stent in left leg     Pleural effusion, bilateral 11/15/2022    Proximal phalanx fracture of the second digit extending into the second metatarsal joint 07/28/2022    RBBB     Right knee pain     Unstable angina 02/12/2019    Added automatically from request for surgery [id number]    Vitamin D deficiency      Past Surgical History:   Procedure Laterality Date    AMPUTATION DIGIT Left 01/17/2023    Procedure: AMPUTATION DIGIT LEFT;  Surgeon: Gopal Márquez MD;  Location:  [facility];  Service: Vascular;  Laterality: Left;    APPENDECTOMY      CARDIAC CATHETERIZATION N/A 02/14/2019    Procedure: Left Heart Cath;  Surgeon: Cooper Apodaca MD;  Location:  [facility];  Service: Cardiology    CARDIAC ELECTROPHYSIOLOGY PROCEDURE N/A 05/18/2022    Procedure: DEVICE IMPLANT;  Surgeon: Cooper Apodaca MD;  Location:  [facility];  Service: Cardiology;  Laterality: N/A;    CARDIAC SURGERY      COLONOSCOPY      CORONARY ARTERY BYPASS GRAFT N/A 03/22/2019    Procedure: MEDIAN STERNOTOMY, CORONARY ARTERY BYPASS GRAFT X3, UTILIZING THE LEFT INTERNAL MAMMARY ARTERY, EVH AND OPEN HARVEST OF THE RIGHT GREATER SAPHENOUS VEIN, EXPLORATION OF THE LEFT LEG;  Surgeon: Ap Au MD;  Location:  [facility];  Service: Cardiothoracic    EYE SURGERY Bilateral     cataracts     FRACTURE SURGERY      INTERVENTIONAL RADIOLOGY PROCEDURE N/A 07/29/2021    Procedure: Abdominal Aortagram with Runoff;  Surgeon: Jermaine Hernandez MD;  Location:  [facility];  Service: Cardiovascular;  Laterality: N/A;    KNEE ARTHROSCOPY      right x 2, left x 1    LACERATION REPAIR      right leg    LEG SURGERY      2 for fracture of rt leg     TONSILLECTOMY      Adnoidectomy    TRANS METATARSAL AMPUTATION Left 08/02/2022    Procedure: GREAT TOE AMPUTATION LEFT;  Surgeon: Gopal Márquez MD;  Location:   [facility];  Service: Vascular;  Laterality: Left;      General Information       Row Name 04/23/24 1145          OT Time and Intention    Document Type evaluation  -AN     Mode of Treatment occupational therapy  -AN       Row Name 04/23/24 1145          General Information    Patient Profile Reviewed yes  -AN     Prior Level of Function independent:;all household mobility;community mobility;gait;mod assist:;ADL's;dependent:;w/c or scooter  ambulates short distances in the home with FWW, depA for manual wc propulsion in community. Currently receiving  PT/OT.  -AN     Existing Precautions/Restrictions fall  -AN     Barriers to Rehab medically complex;previous functional deficit  -AN       Row Name 04/23/24 1145          Living Environment    People in Home spouse  -AN       Row Name 04/23/24 1145          Home Main Entrance    Number of Stairs, Main Entrance three  -AN     Stair Railings, Main Entrance railing on right side (ascending)  -AN       Row Name 04/23/24 1145          Stairs Within Home, Primary    Number of Stairs, Within Home, Primary none  -AN       Row Name 04/23/24 1145          Cognition    Orientation Status (Cognition) oriented x 3  -AN       Row Name 04/23/24 1145          Safety Issues, Functional Mobility    Safety Issues Affecting Function (Mobility) safety precautions follow-through/compliance;safety precaution awareness;problem-solving;awareness of need for assistance;sequencing abilities;judgment;insight into deficits/self-awareness  -AN     Impairments Affecting Function (Mobility) balance;endurance/activity tolerance;strength  -AN               User Key  (r) = Recorded By, (t) = Taken By, (c) = Cosigned By      Initials Name Provider Type    AN Gin Jaeger OT Occupational Therapist                     Mobility/ADL's       Row Name 04/23/24 1146          Bed Mobility    Bed Mobility supine-sit  -AN     Supine-Sit [place] (Bed Mobility) verbal cues;moderate assist (50% patient  effort);1 person assist  -AN     Bed Mobility, Safety Issues decreased use of arms for pushing/pulling;decreased use of legs for bridging/pushing;impaired trunk control for bed mobility  -AN     Assistive Device (Bed Mobility) head of bed elevated  -AN     Comment, (Bed Mobility) Mod A to advance BLE with cues for sequencing of steps  -AN       Row Name 04/23/24 1146          Transfers    Transfers sit-stand transfer;stand-sit transfer;bed-chair transfer  -AN       Row Name 04/23/24 1146          Bed-Chair Transfer    Bed-Chair [place] (Transfers) verbal cues;minimum assist (75% patient effort);2 person assist  -AN     Assistive Device (Bed-Chair Transfers) walker, front-wheeled  -AN       Row Name 04/23/24 1146          Sit-Stand Transfer    Sit-Stand [place] (Transfers) verbal cues;minimum assist (75% patient effort);2 person assist  -AN     Assistive Device (Sit-Stand Transfers) walker, front-wheeled  -AN       Row Name 04/23/24 1146          Stand-Sit Transfer    Stand-Sit [place] (Transfers) verbal cues;minimum assist (75% patient effort);2 person assist  -AN     Assistive Device (Stand-Sit Transfers) walker, front-wheeled  -AN       Row Name 04/23/24 1146          Activities of Daily Living    BADL Assessment/Intervention upper body dressing;lower body dressing  -AN       Row Name 04/23/24 1146          Upper Body Dressing Assessment/Training    [place] Level (Upper Body Dressing) don;pajama/robe;set up  -AN     Position (Upper Body Dressing) edge of bed sitting  -AN       Row Name 04/23/24 114          Lower Body Dressing Assessment/Training    [place] Level (Lower Body Dressing) don;socks;maximum assist (25% patient effort)  -AN     Position (Lower Body Dressing) edge of bed sitting  -AN               User Key  (r) = Recorded By, (t) = Taken By, (c) = Cosigned By      Initials Name Provider Type    Gin Loco OT Occupational Therapist                   Obj/Interventions        Row Name 04/23/24 1147          Sensory Assessment (Somatosensory)    Sensory Assessment (Somatosensory) UE sensation intact  -AN       Row Name 04/23/24 1147          Vision Assessment/Intervention    Visual Impairment/Limitations WFL  -AN       Row Name 04/23/24 1147          Range of Motion Comprehensive    General Range of Motion upper extremity range of motion deficits identified  -AN     Comment, General Range of Motion L shoulder flexion limited to ~90* due to old injury, RUE WFL  -AN       Row Name 04/23/24 1147          Strength Comprehensive (MMT)    General Manual Muscle Testing (MMT) Assessment upper extremity strength deficits identified  -AN     Comment, General Manual Muscle Testing (MMT) Assessment RUE grossly 4/5, deferred LUE due to pain  -AN       Row Name 04/23/24 1147          Motor Skills    Motor Skills functional endurance  -AN     Functional Endurance decreased functional endurance  -AN       Row Name 04/23/24 1147          Balance    Balance Assessment sitting static balance;sitting dynamic balance;sit to stand dynamic balance;standing dynamic balance;standing static balance  -AN     Static Sitting Balance standby assist  -AN     Dynamic Sitting Balance contact guard  -AN     Position, Sitting Balance supported;sitting edge of bed  -AN     Sit to Stand Dynamic Balance verbal cues;minimal assist;2-person assist  -AN     Static Standing Balance contact guard  -AN     Dynamic Standing Balance minimal assist;1-person assist;verbal cues  -AN     Position/Device Used, Standing Balance supported;walker, rolling  -AN     Balance Interventions standing;sit to stand;supported;static;dynamic;minimal challenge;occupation based/functional task  -AN               User Key  (r) = Recorded By, (t) = Taken By, (c) = Cosigned By      Initials Name Provider Type    AN Gin Jaeger OT Occupational Therapist                   Goals/Plan       Row Name 04/23/24 1340          Bed Mobility Goal 1 (OT)     Activity/Assistive Device (Bed Mobility Goal 1, OT) sit to supine/supine to sit  -AN     [place] Level/Cues Needed (Bed Mobility Goal 1, OT) contact guard required  -AN     Time Frame (Bed Mobility Goal 1, OT) long term goal (LTG);10 days  -AN       Row Name 04/23/24 1340          Transfer Goal 1 (OT)    Activity/Assistive Device (Transfer Goal 1, OT) sit-to-stand/stand-to-sit;bed-to-chair/chair-to-bed  -AN     [place] Level/Cues Needed (Transfer Goal 1, OT) contact guard required  -AN     Time Frame (Transfer Goal 1, OT) long term goal (LTG);10 days  -AN       Row Name 04/23/24 1340          Dressing Goal 1 (OT)    Activity/Device (Dressing Goal 1, OT) lower body dressing  -AN     [place]/Cues Needed (Dressing Goal 1, OT) contact guard required  -AN     Time Frame (Dressing Goal 1, OT) long term goal (LTG);10 days  -AN       Row Name 04/23/24 1340          Toileting Goal 1 (OT)    Activity/Device (Toileting Goal 1, OT) adjust/manage clothing;perform perineal hygiene;commode  -AN     [place] Level/Cues Needed (Toileting Goal 1, OT) contact guard required  -AN     Time Frame (Toileting Goal 1, OT) long term goal (LTG);10 days  -AN       Row Name 04/23/24 1340          Therapy Assessment/Plan (OT)    Planned Therapy Interventions (OT) activity tolerance training;adaptive equipment training;BADL retraining;patient/caregiver education/training;transfer/mobility retraining;functional balance retraining;occupation/activity based interventions;strengthening exercise  -AN               User Key  (r) = Recorded By, (t) = Taken By, (c) = Cosigned By      Initials Name Provider Type    AN Gin Jaeger OT Occupational Therapist                   Clinical Impression       Row Name 04/23/24 5585          Pain Assessment    Additional Documentation Pain Scale: FACES Pre/Post-Treatment (Group)  -AN       Row Name 04/23/24 7207          Pain Scale: FACES Pre/Post-Treatment    Pain: FACES Scale,  Pretreatment 0-->no hurt  -AN     Posttreatment Pain Rating 0-->no hurt  -AN     Pre/Posttreatment Pain Comment asymptomatic  -AN       Row Name 04/23/24 1327          Plan of Care Review    Plan of Care Reviewed With patient;spouse  -AN     Progress no change  -AN     Outcome Evaluation Pt presents below his functional baseline with deficits including generalized weakness, impaired balance, decreased activity tolerance, and impaired ADLs warranting skilled OT services. Pt required Mod A for bed mobility and Min A x 2 for functional transfers using the RW. Rec IPR at dc.  -AN       Row Name 04/23/24 1327          Therapy Assessment/Plan (OT)    Patient/Family Therapy Goal Statement (OT) Return to PLOF  -AN     Rehab Potential (OT) good, to achieve stated therapy goals  -AN     Criteria for Skilled Therapeutic Interventions Met (OT) yes;skilled treatment is necessary  -AN     Therapy Frequency (OT) daily  -AN       [facility] Name 04/23/24 1327          Therapy Plan Review/Discharge Plan (OT)    Anticipated Discharge Disposition (OT) inpatient rehabilitation facility  -AN       [facility] Name 04/23/24 1327          Vital Signs    Pre Patient Position Supine  -AN     Intra Patient Position Standing  -AN     Post Patient Position Sitting  -AN       Row Name 04/23/24 1327          Positioning and Restraints    Pre-Treatment Position in bed  -AN     Post Treatment Position bed  -AN     In Bed notified nsg;sitting EOB;with PT  -AN               User Key  (r) = Recorded By, (t) = Taken By, (c) = Cosigned By      Initials Name Provider Type    Gin Loco, OT Occupational Therapist                   Outcome Measures       Row Name 04/23/24 3210          How much help from another is currently needed...    Putting on and taking off regular lower body clothing? 2  -AN     Bathing (including washing, rinsing, and drying) 2  -AN     Toileting (which includes using toilet bed pan or urinal) 2  -AN     Putting on and taking off  regular upper body clothing 3  -AN     Taking care of personal grooming (such as brushing teeth) 3  -AN     Eating meals 4  -AN     AM-PAC 6 Clicks Score (OT) 16  -AN       Row Name 04/23/24 1119 04/23/24 0800       How much help from another person do you currently need...    Turning from your back to your side while in flat bed without using bedrails? 3  -KR 3 (P)   -AM    Moving from lying on back to sitting on the side of a flat bed without bedrails? 2  -KR 2 (P)   -AM    Moving to and from a bed to a chair (including a wheelchair)? 3  -KR 2 (P)   -AM    Standing up from a chair using your arms (e.g., wheelchair, bedside chair)? 3  -KR 2 (P)   -AM    Climbing 3-5 steps with a railing? 2  -KR 1 (P)   -AM    To walk in hospital room? 3  -KR 2 (P)   -AM    AM-PAC 6 Clicks Score (PT) 16  -KR 12 (P)   -AM    Highest Level of Mobility Goal 5 --> Static standing  -KR 4 --> Transfer to chair/commode (P)   -AM      Row Name 04/23/24 1119          Modified [place] Scale    Pre-Stroke Modified [place] Scale 6 - Unable to determine (UTD) from the medical record documentation  -KR     Modified [place] Scale 4 - Moderately severe disability.  Unable to walk without assistance, and unable to attend to own bodily needs without assistance.  -KR       Row Name 04/23/24 1343 04/23/24 1119       Functional Assessment    Outcome Measure Options AM-PAC 6 Clicks Daily Activity (OT)  -AN Modified Deena  -KR              User Key  (r) = Recorded By, (t) = Taken By, (c) = Cosigned By      Initials Name Provider Type    Gin Loco OT Occupational Therapist    Tiffanie Sánchez, PT Physical Therapist    Suze Bradford, Nursing Student Nursing Student                    Occupational Therapy Education       Title: PT OT SLP Therapies (In Progress)       Topic: Occupational Therapy (Done)       Point: ADL training (Done)       Description:   Instruct learner(s) on proper safety adaptation and remediation techniques during self  care or transfers.   Instruct in proper use of assistive devices.                  Learning Progress Summary             Patient Acceptance, E, VU by AN at 4/23/2024 1344                         Point: Home exercise program (Done)       Description:   Instruct learner(s) on appropriate technique for monitoring, assisting and/or progressing therapeutic exercises/activities.                  Learning Progress Summary             Patient Acceptance, E, VU by AN at 4/23/2024 1344                         Point: Precautions (Done)       Description:   Instruct learner(s) on prescribed precautions during self-care and functional transfers.                  Learning Progress Summary             Patient Acceptance, E, VU by AN at 4/23/2024 1344                         Point: Body mechanics (Done)       Description:   Instruct learner(s) on proper positioning and spine alignment during self-care, functional mobility activities and/or exercises.                  Learning Progress Summary             Patient Acceptance, E, VU by AN at 4/23/2024 1344                                         User Key       Initials Effective Dates Name Provider Type Discipline     09/21/21 -  Gin Jaeger, OT Occupational Therapist OT                  OT Recommendation and Plan  Planned Therapy Interventions (OT): activity tolerance training, adaptive equipment training, BADL retraining, patient/caregiver education/training, transfer/mobility retraining, functional balance retraining, occupation/activity based interventions, strengthening exercise  Therapy Frequency (OT): daily  Plan of Care Review  Plan of Care Reviewed With: patient, spouse  Progress: no change  Outcome Evaluation: Pt presents below his functional baseline with deficits including generalized weakness, impaired balance, decreased activity tolerance, and impaired ADLs warranting skilled OT services. Pt required Mod A for bed mobility and Min A x 2 for functional transfers  using the . Rec IPR at [place].     Time Calculation:   Evaluation Complexity (OT)  Review Occupational Profile/Medical/Therapy History Complexity: expanded/moderate complexity  Assessment, Occupational Performance/Identification of Deficit Complexity: 3-5 performance deficits  Clinical Decision Making Complexity (OT): detailed assessment/moderate complexity  Overall Complexity of Evaluation (OT): moderate complexity     Time Calculation- OT       Row Name 04/23/24 1345             Time Calculation- OT    OT Start Time 1030  -AN      OT Received On 04/23/24  -AN      OT Goal Re-Cert Due Date 05/03/24  -AN         Untimed Charges    OT Eval/Re-eval Minutes 46  -AN         Total Minutes    Untimed Charges Total Minutes 46  -AN       Total Minutes 46  -AN                User Key  (r) = Recorded By, (t) = Taken By, (c) = Cosigned By      Initials Name Provider Type    AN Gin Jaeger OT Occupational Therapist                  Therapy Charges for Today       Code Description Service Date Service Provider Modifiers Qty    [id number] HC OT EVAL MOD COMPLEXITY 4 4/23/2024 Gin Jaeger OT GO 1                 Gin Jaeger OT  4/23/2024

## 2024-04-23 NOTE — PLAN OF CARE
Goal Outcome Evaluation:  Plan of Care Reviewed With: patient, spouse           Outcome Evaluation: Pt presents with strength, balance, and endurance below baseline contributing to bed mobility, transfer, and ambulation deficits. Pt will benefit from PT to address aforementioned deficits and return to PLOF. PT rec IRF upon dc.      Anticipated Discharge Disposition (PT): inpatient rehabilitation facility

## 2024-04-23 NOTE — PROGRESS NOTES
[facility] Medicine Services  PROGRESS NOTE    Patient Name: Jermaine Singh  : 1945  MRN: [id number]    Date of Admission: 2024  Primary Care Physician: Marysol Shrestha MD    Subjective   Subjective     CC:  F/U UTI    HPI:  Patient seen and examined. No new issues overnight. Still having burning with urination. Per wife, patient has appt with Dr vences tomorrow. Had a recent cystoscopy. Also follows with Dr chuck hernandez for his cellulitis.     Objective   Objective     Vital Signs:   Temp:  [97.5 °F (36.4 °C)-98.9 °F (37.2 °C)] 98.9 °F (37.2 °C)  Heart Rate:  [67-92] 73  Resp:  [16] 16  BP: ()/(55-91) 100/69     Physical Exam:  Constitutional: No acute distress, awake, alert  HENT: NCAT, mucous membranes moist  Respiratory: Clear to auscultation bilaterally, respiratory effort normal   Cardiovascular: RRR, no murmurs, rubs, or gallops  Gastrointestinal: Positive bowel sounds, soft, nontender, nondistended  Musculoskeletal: No bilateral ankle edema  Psychiatric: Flat affect, cooperative  Neurologic: Oriented x 3, ALBARADO, speech clear  Skin: RLE erythema, fluid filled blister R great toe    Results Reviewed:  LAB RESULTS:      Lab 24  0550 24  1506   WBC 11.83* 10.31   HEMOGLOBIN 9.8* 10.1*   HEMATOCRIT 31.3* 33.5*   PLATELETS 93* 108*   NEUTROS ABS 10.51* 8.74*   IMMATURE GRANS (ABS) 0.09* 0.05   LYMPHS ABS 0.37* 0.38*   MONOS ABS 0.83 0.95*   EOS ABS 0.01 0.16   MCV 95.4 99.4*         Lab 24  0550 24  1713 24  1506   SODIUM 138  --  137   POTASSIUM 4.9  --  5.3*   CHLORIDE 107  --  108*   CO2 14.0*  --  17.0*   ANION GAP 17.0*  --  12.0   BUN 77*  --  77*   CREATININE 1.85*  --  1.70*   EGFR 36.6*  --  40.5*   GLUCOSE 278*  --  264*   CALCIUM 8.7  --  8.8   MAGNESIUM  --   --  2.2   HEMOGLOBIN A1C 8.50*  --   --    TSH  --  1.940  --          Lab 24  1506   TOTAL PROTEIN 6.4   ALBUMIN 3.7   GLOBULIN 2.7   ALT (SGPT) 26   AST (SGOT) 18    BILIRUBIN 0.4   ALK PHOS 80         Lab 04/22/24  1713 04/22/24  1506   HSTROP T 119* 123*             Lab 04/22/24  1836 04/22/24  1506   IRON  --  22*  22*   IRON SATURATION (TSAT)  --  7*   TIBC  --  325   TRANSFERRIN  --  218   FERRITIN  --  309.80   FOLATE 11.40  --    VITAMIN B 12 704  --          Brief Urine Lab Results  (Last result in the past 365 days)        Color   Clarity   Blood   Leuk Est   Nitrite   Protein   CREAT   Urine HCG        04/22/24 1554 Yellow   Clear   Moderate (2+)   Small (1+)   Negative   30 mg/dL (1+)                   Microbiology Results Abnormal       Procedure Component Value - Date/Time    Urine Culture - Urine, Straight Cath [[id number]]  (Normal) Collected: 04/22/24 1554    Lab Status: Preliminary result Specimen: Urine from Straight Cath Updated: 04/23/24 1034     Urine Culture No growth            XR Foot 3+ View Right    Result Date: 4/22/2024  XR FOOT 3+ VW RIGHT Date of Exam: 4/22/2024 6:54 PM EDT Indication: Diabetic foot wound. Comparison: No comparison. Findings: No definite acute osseous abnormality is noted. There is diffuse soft tissue swelling overlying the foot. Mild irregularity noted along the distal aspect of the great toe. No subcutaneous emphysema noted. Joint spaces demonstrate diffuse mild degenerative change. Soft tissue swelling is more prominent along the dorsum of the foot. Peripheral vascular calcifications are noted     Impression: Impression: Large amount of soft tissue swelling with no definite subcutaneous emphysema or destructive bone lesion. Recommend follow-up as clinically indicated Electronically Signed: Jamin Aguilar MD  4/22/2024 7:31 PM EDT  Workstation ID: [id number]    XR Chest 1 View    Result Date: 4/22/2024  XR CHEST 1 VW Date of Exam: 4/22/2024 2:49 PM EDT Indication: Weak/Dizzy/AMS triage protocol Comparison: Chest radiograph 3/9/2024 Findings: Stable cardiomegaly. Dual-chamber AICD. Median sternotomy and prior CABG. Platelike  areas of atelectasis versus scar. No infectious appearing consolidation, edema, large effusion or pneumothorax. Lung volumes are borderline low. Degenerative related osseous changes.     Impression: Impression: No active pulmonary process. Stable radiographic appearance of the chest since 3/9/2024. Electronically Signed: Fran Reis MD  4/22/2024 3:15 PM EDT  Workstation ID: [id number]     Results for orders placed during the hospital encounter of 07/27/22    Adult Transthoracic Echo Complete W/ Cont if Necessary Per Protocol    Interpretation Summary  · Left ventricular ejection fraction appears to be 46 - 50%. Left ventricular systolic function is low normal.  · Left ventricular septal hypokinesis  · No significant valvular heart disease      Current medications:  Scheduled Meds:apixaban, 5 mg, Oral, Q12H  aspirin, 81 mg, Oral, Daily  atorvastatin, 20 mg, Oral, Nightly  azelastine, 2 spray, Each Nare, BID  bumetanide, 1 mg, Oral, BID  carvedilol, 3.125 mg, Oral, BID With Meals  cefTRIAXone, 1,000 mg, Intravenous, Q24H  ferric gluconate, 125 mg, Intravenous, Daily  insulin glargine, 5 Units, Subcutaneous, Nightly  insulin lispro, 2-9 Units, Subcutaneous, 4x Daily AC & at Bedtime  isosorbide mononitrate, 30 mg, Oral, Daily  lactobacillus acidophilus, 1 capsule, Oral, Daily  levothyroxine, 88 mcg, Oral, Q AM  losartan, 50 mg, Oral, Daily  pantoprazole, 40 mg, Oral, Q AM  polyethylene glycol, 17 g, Oral, Daily  sodium chloride, 10 mL, Intravenous, Q12H  tamsulosin, 0.4 mg, Oral, Daily      Continuous Infusions:   PRN Meds:.  acetaminophen    senna-docusate sodium **AND** polyethylene glycol **AND** bisacodyl **AND** bisacodyl    dextrose    dextrose    famotidine    glucagon (human recombinant)    Magnesium Low Dose Replacement - Follow Nurse / BPA Driven Protocol    nitroglycerin    prochlorperazine    sodium chloride    sodium chloride    sodium chloride    Assessment & Plan   Assessment & Plan     Active [facility]  Problems    Diagnosis  POA    **UTI (urinary tract infection) [N39.0]  Yes    PAF (paroxysmal atrial fibrillation) [I48.0]  Yes    Anemia, chronic disease [D63.8]  Yes    Stage 3b chronic kidney disease [N18.32]  Yes    Chronic HFrEF (heart failure with reduced ejection fraction) [I50.22]  Yes    Type 2 diabetes mellitus [E11.9]  Yes    Hypertension [I10]  Yes    Hyperlipidemia [E78.5]  Yes    Hypothyroidism (acquired) [E03.9]  Yes    RBBB [I45.10]  Yes    S/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 Au [Z95.1]  Not Applicable      Resolved Hospital Problems   No resolved problems to display.        Brief Hospital Course to date:  Jermaine Singh is a 79 y.o. male with a history of CAD, DM, HTN, HLD, PAF, PVD, RBBB, Asthma, weakness, Hypothyroidism, CKD, presents to the ED with complaints of generalized weakness.      This patient's problems and plans were partially entered by my partner and updated as appropriate by me 04/23/24.   All problems are new to me today     Assessment and Plan:     Acute UTI (POA)  -- UA: Blood moderate, leukocytes small, protein 30, RBC 11-20, WBC 21-50, bacteria 2+  -Urine Cx with no growth  -Symptomatic with dysuria   -Continue Rocephin for now, ID to see   -Patient had UA at PCP 3/5/24 took ~ 4 days of Bactrim. Was prescribed course of Fluconazole as Cx + yeast which he completed. Also had UA recently at Dr Soto's office, no growth. Continues to have dysuria.  All prior Cx in our system + yeast.      Urinary retention  BPH  Bladder wall thickening--cystoscopy on 3/22/2024 showed scattered areas of flat erythema most notable on the right lateral wall.  3-4+ trabeculation.  -- Acute urinary retention protocol, bladder scan q shift   -- Flomax  -- Follows with Dr. Soto with urology, will consult as patient supposed to follow-up with him in office tomorrow     Generalized weakness  -- Fall precautions  -- PT/OT, PT recommends IPR. Pt/wife want [facility], discussed with CM     Chronic cellulitis  RLE  Blister right foot  --X-ray right foot shows large amount of soft tissue swelling with no definite subcutaneous emphysema or destructive bone lesion  -- WOC has seen  -- Followed by ID, Dr Padilla  -- Will ask PT wound to see for unna boot      T2DM  -- A1c 8.5  -- Continue MDSSI  -- Add Humalog 5 units TID meals  -- Increase Lantus to 10 units nightly      Elevated troponin--chronically elevated (chronically elevate trop >100)  CAD s/p CABG  Parox afib/SSS/pacemaker  HTN  HLD  Severe PVD, Hx multiple toe amputations R foot   RBBB  Chronic HFrEF (EF 42% previous stress test)  -- Strict I's and O's  -- Daily weights  -- Aspirin, atorvastatin,   -- Hold Coreg, Imdur, losartan, Bumex due to low BP      CKD 3 (baseline cr ~1.6-2.1)  Hyperkalemia -> resolved   -Cr at baseline  -Potassium normalized, pt refused Lokelma      Asthma  -currently stable, not in exacerbation  -- Chest x-ray shows no active pulmonary process     Anemia  Iron def  -On IV iron (2/3)  -H&H stable    TCP  -new from prior admission  -Trend  -If continues to drop, will order further work-up     Hypothyroidism  --TSH 1.94; Continue levothyroxine    Expected Discharge Location and Transportation: Rehab  Expected Discharge   Expected Discharge Date: 4/25/2024; Expected Discharge Time:      DVT prophylaxis:  Medical DVT prophylaxis orders are present.         AM-PAC 6 Clicks Score (PT): 16 (04/23/24 1119)    CODE STATUS:   Code Status and Medical Interventions:   Ordered at: 04/22/24 2024     Level Of Support Discussed With:    Patient     Code Status (Patient has no pulse and is not breathing):    CPR (Attempt to Resuscitate)     Medical Interventions (Patient has pulse or is breathing):    Full Support       Luz Elena Batista,   04/23/24

## 2024-04-23 NOTE — PLAN OF CARE
Goal Outcome Evaluation:              Outcome Evaluation: Patient A&O x4 able to voice wants and needs to staff, has denied pain today. Has continued on IV antibiotics for a UTI with no adverse side effects noted. Patient has been up to the chair today with assist from staff and Pt using a walker and gait belt. Patient refused badder scan when asked 2x's during this shift. Has alarms on and in place with call light within reach.

## 2024-04-23 NOTE — PLAN OF CARE
Goal Outcome Evaluation:  Plan of Care Reviewed With: patient, spouse        Progress: no change  Outcome Evaluation: Pt presents below his functional baseline with deficits including generalized weakness, impaired balance, decreased activity tolerance, and impaired ADLs warranting skilled OT services. Pt required Mod A for bed mobility and Min A x 2 for functional transfers using the RW. Rec IPR at [place].      Anticipated Discharge Disposition (OT): inpatient rehabilitation facility

## 2024-04-23 NOTE — CONSULTS
INFECTIOUS DISEASE CONSULT/INITIAL HOSPITAL VISIT    Jermaine Singh  1945  [id number]    Date of Consult: 4/23/2024    Admission Date: 4/22/2024      Requesting Provider: No ref. provider found  Evaluating Physician: Lincoln Padilla MD    Reason for Consultation:  UTI    History of present illness:    Patient is a 79 y.o. male  with a history of type 2 diabetes mellitus, peripheral neuropathy, stage III chronic kidney disease, bilateral DVTs, paroxysmal atrial fibrillation, sick sinus syndrome status post pacemaker placement, CHF, peripheral vascular disease, and left diabetic foot infection who is seen today for evaluation of UTI.  He had a history of UTI last month with urine cultures and early March growing yeast.  He was apparently treated with an antifungal agent.    Over the last few days he noted the onset of profound weakness and fatigue.  He also noticed some dysuria.  He was brought to the emergency room and found to have pyuria and bacteriuria with leukocytosis.   Urine culture was sent and he was started on ceftriaxone.   His white blood cell count today is 11.8.   He was noted to have a blister over his right fifth metatarsal head and a chronic right pretibial wound.    Past Medical History:   Diagnosis Date    Allergic     Arthritis     Asthma     Coronary artery disease     Diabetes mellitus 2000    started on inuslin 12/2018; started on po meds in 2000; checking blood sugars daily     Disease of thyroid gland     po meds daily for hypothyroidism     Elevated serum creatinine 11/15/2022    Elevated troponin 10/02/2022    Generalized weakness 11/15/2022    History of fracture as a child     rt leg- severe     Hyperlipidemia     Hypertension     Hypothyroidism     PAF (paroxysmal atrial fibrillation) 07/23/2023    Peripheral neuropathy     Peripheral vascular disease     s/p angiogram 2/19-needs stent in left leg     Pleural effusion, bilateral 11/15/2022    Proximal phalanx fracture  of the second digit extending into the second metatarsal joint 07/28/2022    RBBB     Right knee pain     Unstable angina 02/12/2019    Added automatically from request for surgery [id number]    Vitamin D deficiency        Past Surgical History:   Procedure Laterality Date    AMPUTATION DIGIT Left 01/17/2023    Procedure: AMPUTATION DIGIT LEFT;  Surgeon: Gopal Márquez MD;  Location:  [facility];  Service: Vascular;  Laterality: Left;    APPENDECTOMY      CARDIAC CATHETERIZATION N/A 02/14/2019    Procedure: Left Heart Cath;  Surgeon: Cooper Apodaca MD;  Location: [facility] CATH INVASIVE LOCATION;  Service: Cardiology    CARDIAC ELECTROPHYSIOLOGY PROCEDURE N/A 05/18/2022    Procedure: DEVICE IMPLANT;  Surgeon: Cooper Apodaca MD;  Location: [facility] CATH INVASIVE LOCATION;  Service: Cardiology;  Laterality: N/A;    CARDIAC SURGERY      COLONOSCOPY      CORONARY ARTERY BYPASS GRAFT N/A 03/22/2019    Procedure: MEDIAN STERNOTOMY, CORONARY ARTERY BYPASS GRAFT X3, UTILIZING THE LEFT INTERNAL MAMMARY ARTERY, EVH AND OPEN HARVEST OF THE RIGHT GREATER SAPHENOUS VEIN, EXPLORATION OF THE LEFT LEG;  Surgeon: Ap Au MD;  Location:  [facility];  Service: Cardiothoracic    EYE SURGERY Bilateral     cataracts     FRACTURE SURGERY      INTERVENTIONAL RADIOLOGY PROCEDURE N/A 07/29/2021    Procedure: Abdominal Aortagram with Runoff;  Surgeon: Jermaine Hernandez MD;  Location:  [facility] CATH INVASIVE LOCATION;  Service: Cardiovascular;  Laterality: N/A;    KNEE ARTHROSCOPY      right x 2, left x 1    LACERATION REPAIR      right leg    LEG SURGERY      2 for fracture of rt leg     TONSILLECTOMY      Adnoidectomy    TRANS METATARSAL AMPUTATION Left 08/02/2022    Procedure: GREAT TOE AMPUTATION LEFT;  Surgeon: Gopal Márquez MD;  Location:  [facility];  Service: Vascular;  Laterality: Left;       Family History   Problem Relation Age of Onset    Coronary artery disease Mother     Diabetes Mother     Hyperlipidemia Mother      Cancer Father        Social History     Socioeconomic History    Marital status:     Number of children: 2   Tobacco Use    Smoking status: Never     Passive exposure: Past    Smokeless tobacco: Never   Vaping Use    Vaping status: Never Used   Substance and Sexual Activity    Alcohol use: Not Currently     Comment: every 2-3 months    Drug use: No    Sexual activity: Not Currently     Partners: Female       Allergies   Allergen Reactions    Vancomycin Other (See Comments)     Kidney failure per last admission         Medication:    Current Facility-Administered Medications:     acetaminophen (TYLENOL) tablet 650 mg, 650 mg, Oral, Q6H PRN, Brad Perazaa W, APRN    apixaban (ELIQUIS) tablet 5 mg, 5 mg, Oral, Q12H, Brad Perazaa W, APRN, 5 mg at 04/23/24 0901    aspirin chewable tablet 81 mg, 81 mg, Oral, Daily, Maricel Peraza W, APRN, 81 mg at 04/23/24 0902    atorvastatin (LIPITOR) tablet 20 mg, 20 mg, Oral, Nightly, Maricel Peraza W, APRN, 20 mg at 04/22/24 2214    azelastine (ASTELIN) nasal spray 2 spray, 2 spray, Each Nare, BID, Maricel Peraza W, APRN    sennosides-docusate (PERICOLACE) 8.6-50 MG per tablet 2 tablet, 2 tablet, Oral, BID PRN **AND** polyethylene glycol (MIRALAX) packet 17 g, 17 g, Oral, Daily PRN, 17 g at 04/22/24 2225 **AND** bisacodyl (DULCOLAX) EC tablet 5 mg, 5 mg, Oral, Daily PRN, 5 mg at 04/22/24 2225 **AND** bisacodyl (DULCOLAX) suppository 10 mg, 10 mg, Rectal, Daily PRN, Maricel Peraza W, APRN    [Held by provider] bumetanide (BUMEX) tablet 1 mg, 1 mg, Oral, BID, Maricel Peraza, APRN, 1 mg at 04/22/24 2224    [Held by provider] carvedilol (COREG) tablet 3.125 mg, 3.125 mg, Oral, BID With Meals, Maricel Peraza, APRN, 3.125 mg at 04/22/24 2214    cefTRIAXone (ROCEPHIN) 1,000 mg in sodium chloride 0.9 % 100 mL MBP, 1,000 mg, Intravenous, Q24H, Maricel Peraza APRN, Last Rate: 200 mL/hr at 04/23/24 1216, 1,000 mg at 04/23/24 1216    dextrose  (D50W) (25 g/50 mL) IV injection 25 g, 25 g, Intravenous, Q15 Min PRN, Maricel Peraza APRN    dextrose (GLUTOSE) oral gel 15 g, 15 g, Oral, Q15 Min PRN, Maricel Peraza APRN    famotidine (PEPCID) tablet 20 mg, 20 mg, Oral, BID PRN, Maricel Peraza APRN, 20 mg at 04/22/24 2214    ferric gluconate (FERRLECIT)125 MG in sodium chloride 0.9 % 100 mL IVPB, 125 mg, Intravenous, Daily, Artur Avila MD, Stopped at 04/23/24 1120    glucagon (GLUCAGEN) injection 1 mg, 1 mg, Intramuscular, Q15 Min PRN, Maricel Peraza APRN    insulin glargine (LANTUS, SEMGLEE) injection 10 Units, 10 Units, Subcutaneous, Nightly, Luz Elena Batista DO    Insulin Lispro (humaLOG) injection 2-9 Units, 2-9 Units, Subcutaneous, 4x Daily AC & at Bedtime, Maricel Peraza APRN, 7 Units at 04/23/24 1216    Insulin Lispro (humaLOG) injection 5 Units, 5 Units, Subcutaneous, TID With Meals, Luz Elena Batista DO    [Held by provider] isosorbide mononitrate (IMDUR) 24 hr tablet 30 mg, 30 mg, Oral, Daily, Maricel Peraza APRN    lactobacillus acidophilus (RISAQUAD) capsule 1 capsule, 1 capsule, Oral, Daily, Maricel Peraza APRN, 1 capsule at 04/23/24 0901    levothyroxine (SYNTHROID, LEVOTHROID) tablet 88 mcg, 88 mcg, Oral, Q AM, Maricel Peraza APRN, 88 mcg at 04/23/24 0932    [Held by provider] losartan (COZAAR) tablet 50 mg, 50 mg, Oral, Daily, Maricel Peraza APRN    Magnesium Low Dose Replacement - Follow Nurse / BPA Driven Protocol, , Does not apply, PRN, Maricel Peraza APRN    nitroglycerin (NITROSTAT) SL tablet 0.4 mg, 0.4 mg, Sublingual, Q5 Min PRN, Maricel Peraza APRN    pantoprazole (PROTONIX) EC tablet 40 mg, 40 mg, Oral, Q AM, Artur Avila MD    polyethylene glycol (MIRALAX) packet 17 g, 17 g, Oral, Daily, Artur Avila MD, 17 g at 04/23/24 0913    prochlorperazine (COMPAZINE) injection 5 mg, 5 mg, Intravenous, Q6H PRN, Maricel Peraza APRN, 5 mg  at 24 2225    sodium chloride 0.9 % flush 10 mL, 10 mL, Intravenous, PRN, Maricel Peraza, APRN    sodium chloride 0.9 % flush 10 mL, 10 mL, Intravenous, Q12H, Maricel Peraza APRN, 10 mL at 24 0906    sodium chloride 0.9 % flush 10 mL, 10 mL, Intravenous, PRN, Maricel Peraza, APRN    sodium chloride 0.9 % infusion 40 mL, 40 mL, Intravenous, PRN, Maricel Peraza, APRN    tamsulosin (FLOMAX) 24 hr capsule 0.4 mg, 0.4 mg, Oral, Daily, Maricel Peraza APRN, 0.4 mg at 24 0901    Antibiotics:  Anti-Infectives (From admission, onward)      Ordered     Dose/Rate Route Frequency Start Stop    24 1743  cefTRIAXone (ROCEPHIN) 1,000 mg in sodium chloride 0.9 % 100 mL MBP        Ordering Provider: Maricel Peraza APRN    1,000 mg  200 mL/hr over 30 Minutes Intravenous Every 24 Hours Scheduled 24 1845 24 1159              Review of Systems:  Constitutional--  profound malaise and fatigue  HEENT--  headache without sore throat  CV-- coronary artery disease and history of PAF  Resp-- No SOB/cough  GI-   nausea  --  dysuria and urinary retention  Heme- No active bruising or bleeding;  history of DVTs  MS-- no swelling or pain in the bones or joints of arms/legs.   Neuro-- No acute focal weakness   Skin-- chronic  right pretibial wound  Endocrine-type 2 diabetes mellitus      Physical Exam:   Vital Signs  Temp (24hrs), Av.3 °F (36.8 °C), Min:97.6 °F (36.4 °C), Max:98.9 °F (37.2 °C)    Temp  Min: 97.6 °F (36.4 °C)  Max: 98.9 °F (37.2 °C)  BP  Min: 91/62  Max: 126/91  Pulse  Min: 67  Max: 92  Resp  Min: 16  Max: 16  SpO2  Min: 92 %  Max: 97 %    GENERAL: Awake and alert, in no acute distress.   HEENT: Normocephalic, atraumatic.  PERRL. EOMI. No conjunctival injection. No icterus. Oropharynx clear without evidence of thrush or exudate. No evidence of periodontal disease.    NECK: Supple without nuchal rigidity. No mass.  LYMPH: No cervical, axillary or inguinal  "lymphadenopathy.  HEART: No murmur, rubs, gallops.   LUNGS: Clear to auscultation bilaterally without wheezing, rales, rhonchi. Normal respiratory effort. Nonlabored. No dullness.  ABDOMEN: Soft, nontender, nondistended. Positive bowel sounds. No rebound or guarding. NO mass or HSM.  EXT:  No cyanosis, clubbing or edema. No cord.  :  Without Fonseca catheter.  MSK: No joint effusions or erythema  SKIN: Warm and dry without cutaneous eruptions on Inspection/palpation.    NEURO: Oriented to PPT.  Motor 5/5 strength  PSYCHIATRIC: Normal insight and judgment. Cooperative with PE    Laboratory Data    Results from last 7 days   Lab Units 04/23/24  0550 04/22/24  1506   WBC 10*3/mm3 11.83* 10.31   HEMOGLOBIN g/dL 9.8* 10.1*   HEMATOCRIT % 31.3* 33.5*   PLATELETS 10*3/mm3 93* 108*     Results from last 7 days   Lab Units 04/23/24  0550   SODIUM mmol/L 138   POTASSIUM mmol/L 4.9   CHLORIDE mmol/L 107   CO2 mmol/L 14.0*   BUN mg/dL 77*   CREATININE mg/dL 1.85*   GLUCOSE mg/dL 278*   CALCIUM mg/dL 8.7     Results from last 7 days   Lab Units 04/22/24  1506   ALK PHOS U/L 80   BILIRUBIN mg/dL 0.4   ALT (SGPT) U/L 26   AST (SGOT) U/L 18                         Estimated Creatinine Clearance: 35.6 mL/min (A) (by C-G formula based on SCr of 1.85 mg/dL (H)).      Microbiology:  No results found for: \"ACANTHNAEG\", \"AFBCX\", \"BPERTUSSISCX\", \"BLOODCX\"  No results found for: \"BCIDPCR\", \"CXREFLEX\", \"CSFCX\", \"CULTURETIS\"  No results found for: \"CULTURES\", \"HSVCX\", \"URCX\"  No results found for: \"EYECULTURE\", \"GCCX\", \"HSVCULTURE\", \"LABHSV\"  No results found for: \"LEGIONELLA\", \"MRSACX\", \"MUMPSCX\", \"MYCOPLASCX\"  No results found for: \"NOCARDIACX\", \"STOOLCX\"  Urine Culture   Date Value Ref Range Status   04/22/2024 No growth  Preliminary     No results found for: \"VIRALCULTU\", \"WOUNDCX\"        Radiology:  Imaging Results (Last 72 Hours)       Procedure Component Value Units Date/Time    XR Foot 3+ View Right [[id number]] Collected: 04/22/24 " 1929     Updated: 04/22/24 1934    Narrative:      XR FOOT 3+ VW RIGHT    Date of Exam: 4/22/2024 6:54 PM EDT    Indication: Diabetic foot wound.    Comparison: No comparison.    Findings:  No definite acute osseous abnormality is noted. There is diffuse soft tissue swelling overlying the foot. Mild irregularity noted along the distal aspect of the great toe. No subcutaneous emphysema noted. Joint spaces demonstrate diffuse mild   degenerative change.    Soft tissue swelling is more prominent along the dorsum of the foot. Peripheral vascular calcifications are noted      Impression:      Impression:  Large amount of soft tissue swelling with no definite subcutaneous emphysema or destructive bone lesion.    Recommend follow-up as clinically indicated      Electronically Signed: Jamin Aguilar MD    4/22/2024 7:31 PM EDT    Workstation ID: [id number]    XR Chest 1 View [[id number]] Collected: 04/22/24 1514     Updated: 04/22/24 1518    Narrative:      XR CHEST 1 VW    Date of Exam: 4/22/2024 2:49 PM EDT    Indication: Weak/Dizzy/AMS triage protocol    Comparison: Chest radiograph 3/9/2024    Findings:  Stable cardiomegaly. Dual-chamber AICD. Median sternotomy and prior CABG. Platelike areas of atelectasis versus scar. No infectious appearing consolidation, edema, large effusion or pneumothorax. Lung volumes are borderline low. Degenerative related   osseous changes.      Impression:      Impression:  No active pulmonary process. Stable radiographic appearance of the chest since 3/9/2024.      Electronically Signed: Fran Reis MD    4/22/2024 3:15 PM EDT    Workstation ID: [id number]              Impression:    1.  UTI-with urinary retention.   The previous urine culture performed on 3/5/2024 grew yeast.  Current urinalysis reveals bacteriuria and does not reveal yeast.  I will continue ceftriaxone pending culture data.   2.    Urinary retention-he required In-N-Out catheterization in the emergency room   3.  Right  fifth metatarsal wound/bullous lesion-without evidence of cellulitis   4.  Chronic right pretibial dermatitis-without evidence of cellulitis  5.  Type 2 diabetes mellitus  6.  coronary artery disease/status post CABG, 3/22/2019  7.  Chronic HFrEF  8.  Stage IIIb chronic kidney disease  9.  Paroxysmal atrial fibrillation  10.  Leukocytosis/neutrophilia    PLAN/RECOMMENDATIONS:   Thank you for asking us to see Jermaine Singh, I recommend the followin.  Offload the right fifth metatarsal wound  2.  Urine culture  3.  Ceftriaxone 2 g IV daily       Lincoln Padilla MD  2024  16:16 EDT

## 2024-04-23 NOTE — NURSING NOTE
WOC consult for RLE and right foot blister.    Per family member and patient, patient previously with traumatic laceration to right leg causing more swelling in right leg.     Right leg presents with venous stasis dermatitis patch on anterior shin, mild erythema, no warmth, macerated and fragile skin. No firmness, odor, pain. Small amount of serous drainage. Papular appearing rash, no pruritis, although patient states he cannot feel this area well.     Cleansed with VASHE soak to rebalance skin and knock back pathogens. Applied Hydrofiber AG per family's request, covered with silicone foam dressing. Recommend q3 day dressing changes per nursing. Can also consider topical steroids if rash progresses.     Right foot blister appears intact, likely from trauma. Recommend leaving blister intact, can paint with betadine.    Heels were intact, patient cannot feel feet so allevyn life dressings placed, refused heel boots despite education.    Will sign off.

## 2024-04-24 LAB
ANION GAP SERPL CALCULATED.3IONS-SCNC: 12 MMOL/L (ref 5–15)
BACTERIA UR QL AUTO: ABNORMAL /HPF
BILIRUB UR QL STRIP: NEGATIVE
BUN SERPL-MCNC: 80 MG/DL (ref 8–23)
BUN/CREAT SERPL: 38.8 (ref 7–25)
CALCIUM SPEC-SCNC: 8.2 MG/DL (ref 8.6–10.5)
CHLORIDE SERPL-SCNC: 105 MMOL/L (ref 98–107)
CLARITY UR: CLEAR
CO2 SERPL-SCNC: 17 MMOL/L (ref 22–29)
COLOR UR: YELLOW
CREAT SERPL-MCNC: 2.06 MG/DL (ref 0.76–1.27)
DEPRECATED RDW RBC AUTO: 44.9 FL (ref 37–54)
EGFRCR SERPLBLD CKD-EPI 2021: 32.2 ML/MIN/1.73
ERYTHROCYTE [DISTWIDTH] IN BLOOD BY AUTOMATED COUNT: 13.3 % (ref 12.3–15.4)
GLUCOSE BLDC GLUCOMTR-MCNC: 105 MG/DL (ref 70–130)
GLUCOSE BLDC GLUCOMTR-MCNC: 108 MG/DL (ref 70–130)
GLUCOSE BLDC GLUCOMTR-MCNC: 74 MG/DL (ref 70–130)
GLUCOSE BLDC GLUCOMTR-MCNC: 77 MG/DL (ref 70–130)
GLUCOSE SERPL-MCNC: 74 MG/DL (ref 65–99)
GLUCOSE UR STRIP-MCNC: NEGATIVE MG/DL
HCT VFR BLD AUTO: 28.8 % (ref 37.5–51)
HGB BLD-MCNC: 9.4 G/DL (ref 13–17.7)
HGB UR QL STRIP.AUTO: ABNORMAL
HYALINE CASTS UR QL AUTO: ABNORMAL /LPF
KETONES UR QL STRIP: NEGATIVE
LEUKOCYTE ESTERASE UR QL STRIP.AUTO: ABNORMAL
MCH RBC QN AUTO: 30.1 PG (ref 26.6–33)
MCHC RBC AUTO-ENTMCNC: 32.6 G/DL (ref 31.5–35.7)
MCV RBC AUTO: 92.3 FL (ref 79–97)
NITRITE UR QL STRIP: NEGATIVE
PH UR STRIP.AUTO: 5.5 [PH] (ref 5–8)
PLATELET # BLD AUTO: 104 10*3/MM3 (ref 140–450)
PMV BLD AUTO: 12.5 FL (ref 6–12)
POTASSIUM SERPL-SCNC: 4.8 MMOL/L (ref 3.5–5.2)
PROT UR QL STRIP: ABNORMAL
RBC # BLD AUTO: 3.12 10*6/MM3 (ref 4.14–5.8)
RBC # UR STRIP: ABNORMAL /HPF
REF LAB TEST METHOD: ABNORMAL
SODIUM SERPL-SCNC: 134 MMOL/L (ref 136–145)
SP GR UR STRIP: 1.01 (ref 1–1.03)
SQUAMOUS #/AREA URNS HPF: ABNORMAL /HPF
UROBILINOGEN UR QL STRIP: ABNORMAL
WBC # UR STRIP: ABNORMAL /HPF
WBC NRBC COR # BLD AUTO: 8.82 10*3/MM3 (ref 3.4–10.8)

## 2024-04-24 PROCEDURE — 99232 SBSQ HOSP IP/OBS MODERATE 35: CPT | Performed by: FAMILY MEDICINE

## 2024-04-24 PROCEDURE — 29580 STRAPPING UNNA BOOT: CPT

## 2024-04-24 PROCEDURE — 85027 COMPLETE CBC AUTOMATED: CPT | Performed by: FAMILY MEDICINE

## 2024-04-24 PROCEDURE — 97597 DBRDMT OPN WND 1ST 20 CM/<: CPT

## 2024-04-24 PROCEDURE — 87086 URINE CULTURE/COLONY COUNT: CPT | Performed by: INTERNAL MEDICINE

## 2024-04-24 PROCEDURE — 81001 URINALYSIS AUTO W/SCOPE: CPT | Performed by: INTERNAL MEDICINE

## 2024-04-24 PROCEDURE — 25010000002 NA FERRIC GLUC CPLX PER 12.5 MG: Performed by: INTERNAL MEDICINE

## 2024-04-24 PROCEDURE — 82948 REAGENT STRIP/BLOOD GLUCOSE: CPT

## 2024-04-24 PROCEDURE — 97164 PT RE-EVAL EST PLAN CARE: CPT

## 2024-04-24 PROCEDURE — 97116 GAIT TRAINING THERAPY: CPT

## 2024-04-24 PROCEDURE — 97110 THERAPEUTIC EXERCISES: CPT

## 2024-04-24 PROCEDURE — 25010000002 CEFTRIAXONE PER 250 MG: Performed by: NURSE PRACTITIONER

## 2024-04-24 PROCEDURE — 63710000001 INSULIN LISPRO (HUMAN) PER 5 UNITS: Performed by: FAMILY MEDICINE

## 2024-04-24 PROCEDURE — 80048 BASIC METABOLIC PNL TOTAL CA: CPT | Performed by: FAMILY MEDICINE

## 2024-04-24 PROCEDURE — 63710000001 INSULIN GLARGINE PER 5 UNITS: Performed by: FAMILY MEDICINE

## 2024-04-24 PROCEDURE — 87106 FUNGI IDENTIFICATION YEAST: CPT | Performed by: FAMILY MEDICINE

## 2024-04-24 RX ADMIN — ASPIRIN 81 MG CHEWABLE TABLET 81 MG: 81 TABLET CHEWABLE at 09:01

## 2024-04-24 RX ADMIN — Medication 10 ML: at 09:02

## 2024-04-24 RX ADMIN — Medication 10 ML: at 21:00

## 2024-04-24 RX ADMIN — INSULIN LISPRO 5 UNITS: 100 INJECTION, SOLUTION INTRAVENOUS; SUBCUTANEOUS at 09:00

## 2024-04-24 RX ADMIN — ATORVASTATIN CALCIUM 20 MG: 20 TABLET, FILM COATED ORAL at 21:00

## 2024-04-24 RX ADMIN — POLYETHYLENE GLYCOL 3350 17 G: 17 POWDER, FOR SOLUTION ORAL at 09:01

## 2024-04-24 RX ADMIN — INSULIN GLARGINE 8 UNITS: 100 INJECTION, SOLUTION SUBCUTANEOUS at 21:00

## 2024-04-24 RX ADMIN — APIXABAN 5 MG: 5 TABLET, FILM COATED ORAL at 09:01

## 2024-04-24 RX ADMIN — PANTOPRAZOLE SODIUM 40 MG: 40 TABLET, DELAYED RELEASE ORAL at 09:01

## 2024-04-24 RX ADMIN — INSULIN LISPRO 5 UNITS: 100 INJECTION, SOLUTION INTRAVENOUS; SUBCUTANEOUS at 12:27

## 2024-04-24 RX ADMIN — LEVOTHYROXINE SODIUM 88 MCG: 88 TABLET ORAL at 09:01

## 2024-04-24 RX ADMIN — SODIUM CHLORIDE 1000 MG: 900 INJECTION INTRAVENOUS at 12:27

## 2024-04-24 RX ADMIN — TAMSULOSIN HYDROCHLORIDE 0.4 MG: 0.4 CAPSULE ORAL at 09:01

## 2024-04-24 RX ADMIN — APIXABAN 5 MG: 5 TABLET, FILM COATED ORAL at 21:00

## 2024-04-24 RX ADMIN — SODIUM CHLORIDE 125 MG: 9 INJECTION, SOLUTION INTRAVENOUS at 09:17

## 2024-04-24 RX ADMIN — Medication 1 CAPSULE: at 09:01

## 2024-04-24 NOTE — DISCHARGE PLACEMENT REQUEST
"Jermaine Dawn \"Adi\" (79 y.o. Male)     CM Tiana Tovar BSN RN         [facility]       [phone]        Date of Birth   1945    Social Security Number       Address   [address] [facility] [address]    Home Phone   [phone]    MRN   [id number]       Pentecostal   None    Marital Status                               Admission Date   4/22/24    Admission Type   Emergency    Admitting Provider   Luz Elena Batista DO    Attending Provider   Luz Elena Batista DO    Department, Room/Bed   [address], S326/1       Discharge Date       Discharge Disposition       Discharge Destination                                 Attending Provider: Luz Elena Batista DO    Allergies: Vancomycin    Isolation: None   Infection: None   Code Status: CPR    Ht: 190.5 cm (75\")   Wt: 77.7 kg (171 lb 4.8 oz)    Admission Cmt: None   Principal Problem: UTI (urinary tract infection) [N39.0]                   Active Insurance as of 4/22/2024       Primary Coverage       Payor Plan Insurance Group Employer/Plan Group    MEDICARE MEDICARE A & B        Payor Plan Address Payor Plan Phone Number Payor Plan Fax Number Effective Dates    [address] [phone]  3/1/2010 - None Entered    [facility] [id number]         Subscriber Name Subscriber Birth Date Member ID       JERMAINE DAWN 1945 [id number]               Secondary Coverage       Payor Plan Insurance Group Employer/Plan Group    HUMANA HUMANA  SUP              [id number]       Payor Plan Address Payor Plan Phone Number Payor Plan Fax Number Effective Dates    [address] [address]   8/1/2020 - None Entered    formerly [facility] [id number]         Subscriber Name Subscriber Birth Date Member ID       JERMAINE DAWN 1945 [id number]                     Emergency Contacts        (Rel.) Home Phone Work Phone Mobile Phone    LÓPEZHIPOLITO (Spouse) [phone] -- [phone]    JUSTHOR GUERRA (Daughter) [phone] -- [phone] " "                History & Physical        Artur Avila MD at 24 1906              [facility] Medicine Services  HISTORY AND PHYSICAL    Patient Name: Jermaine Singh  : 1945  MRN: [id number]  Primary Care Physician: Marysol Shrestha MD  Date of admission: 2024    Subjective  Subjective     Chief Complaint:  Generalized weakness    HPI:  Jermaine Singh is a 79 y.o. male with a history of CAD, DM, HTN, HLD, PAF, PVD, RBBB, Asthma, weakness, Hypothyroidism, CKD, presents to the ED with complaints of generalized weakness.  Patient has a chronic wound on his RLE and is followed by home health.  The wound on his RLE is being treated with silver and covered with a dressing.  Wife reports that his wound had been improving but today he has had increased serous drainage.  Wife also reports that today she noticed a \"blister\" on the bottom of his right foot.  Patient has had difficulty urinating today and required in and out cath in the ED for retention.  Patient complains of nausea and vomiting this afternoon, dizziness for the past 2 days, fatigue, dysuria, and lower abdominal pain that he attributes to his hernia.  At baseline patient has a lift chair and uses a walker at home.  Today he was unable to stand even with the lift chair due to weakness and called EMS for assistance.  No fevers, shortness of air, chest pain, diarrhea, melena, difficulty with speech, focal weakness, or any other complaints at this time.  UA consistent with UTI.  Chest x-ray shows no active pulmonary process.  X-ray right foot shows large amount of soft tissue swelling with no definite subcutaneous emphysema or destructive bony lesion.  Patient was started on Rocephin in the ED, and is being admitted to the hospitalist for further evaluation and management.        Review of Systems   Constitutional:  Positive for fatigue. Negative for appetite change, chills and fever.   Eyes: Negative.  "   Respiratory: Negative.     Cardiovascular:  Positive for leg swelling. Negative for chest pain and palpitations.   Gastrointestinal:  Positive for abdominal pain, constipation, diarrhea, nausea and vomiting. Negative for abdominal distention.   Endocrine: Negative.    Genitourinary:  Positive for difficulty urinating and dysuria. Negative for frequency and urgency.   Musculoskeletal: Negative.    Skin:  Positive for wound.   Allergic/Immunologic: Negative.    Neurological:  Positive for dizziness, weakness, light-headedness and headaches. Negative for syncope, facial asymmetry and speech difficulty.   Hematological: Negative.    Psychiatric/Behavioral: Negative.                  Personal History     Past Medical History:   Diagnosis Date    Allergic     Arthritis     Asthma     Coronary artery disease     Diabetes mellitus 2000    started on inuslin 12/2018; started on po meds in 2000; checking blood sugars daily     Disease of thyroid gland     po meds daily for hypothyroidism     Elevated serum creatinine 11/15/2022    Elevated troponin 10/02/2022    Generalized weakness 11/15/2022    History of fracture as a child     rt leg- severe     Hyperlipidemia     Hypertension     Hypothyroidism     PAF (paroxysmal atrial fibrillation) 07/23/2023    Peripheral neuropathy     Peripheral vascular disease     s/p angiogram 2/19-needs stent in left leg     Pleural effusion, bilateral 11/15/2022    Proximal phalanx fracture of the second digit extending into the second metatarsal joint 07/28/2022    RBBB     Right knee pain     Unstable angina 02/12/2019    Added automatically from request for surgery [id number]    Vitamin D deficiency              Past Surgical History:   Procedure Laterality Date    AMPUTATION DIGIT Left 01/17/2023    Procedure: AMPUTATION DIGIT LEFT;  Surgeon: Gopal Márquez MD;  Location: [facility];  Service: Vascular;  Laterality: Left;    APPENDECTOMY      CARDIAC CATHETERIZATION N/A 02/14/2019     Procedure: Left Heart Cath;  Surgeon: Cooper Apodaca MD;  Location:  [facility];  Service: Cardiology    CARDIAC ELECTROPHYSIOLOGY PROCEDURE N/A 05/18/2022    Procedure: DEVICE IMPLANT;  Surgeon: Cooper Apodaca MD;  Location:  [facility];  Service: Cardiology;  Laterality: N/A;    CARDIAC SURGERY      COLONOSCOPY      CORONARY ARTERY BYPASS GRAFT N/A 03/22/2019    Procedure: MEDIAN STERNOTOMY, CORONARY ARTERY BYPASS GRAFT X3, UTILIZING THE LEFT INTERNAL MAMMARY ARTERY, EVH AND OPEN HARVEST OF THE RIGHT GREATER SAPHENOUS VEIN, EXPLORATION OF THE LEFT LEG;  Surgeon: Ap Au MD;  Location:  [facility];  Service: Cardiothoracic    EYE SURGERY Bilateral     cataracts     FRACTURE SURGERY      INTERVENTIONAL RADIOLOGY PROCEDURE N/A 07/29/2021    Procedure: Abdominal Aortagram with Runoff;  Surgeon: Jermaine Hernandez MD;  Location:  [facility];  Service: Cardiovascular;  Laterality: N/A;    KNEE ARTHROSCOPY      right x 2, left x 1    LACERATION REPAIR      right leg    LEG SURGERY      2 for fracture of rt leg     TONSILLECTOMY      Adnoidectomy    TRANS METATARSAL AMPUTATION Left 08/02/2022    Procedure: GREAT TOE AMPUTATION LEFT;  Surgeon: Gopal Márquez MD;  Location:  [facility];  Service: Vascular;  Laterality: Left;       Family History:  family history includes Cancer in his father; Coronary artery disease in his mother; Diabetes in his mother; Hyperlipidemia in his mother.     Social History:  reports that he has never smoked. He has been exposed to tobacco smoke. He has never used smokeless tobacco. He reports that he does not currently use alcohol. He reports that he does not use drugs.  Social History     Social History Narrative    Lives in [place].       Medications:  Align, Cholecalciferol, Diclofenac Sodium, Hernia Support Right Medium, Hydrocortisone (Perianal), Insulin Lispro, Lifitegrast, Rollator Ultra-Light, Vibegron,  acetaminophen, apixaban, aspirin, atorvastatin, azelastine, bumetanide, carvedilol, famotidine, fluconazole, fluocinonide, glucose blood, insulin detemir, isosorbide mononitrate, levothyroxine, linaclotide, losartan, metoprolol succinate XL, mupirocin, nitroglycerin, and nystatin    Allergies   Allergen Reactions    Vancomycin Other (See Comments)     Kidney failure per last admission       Objective  Objective     Vital Signs:   Temp:  [97.5 °F (36.4 °C)] 97.5 °F (36.4 °C)  Heart Rate:  [84-86] 86  Resp:  [16] 16  BP: (107-130)/(70-74) 110/72    Physical Exam   Constitutional: Awake, alert, wife at bedside, appears chronically ill  Eyes: PERRLA, sclerae anicteric, no conjunctival injection  HENT: NCAT, mucous membranes moist  Neck: Supple, no thyromegaly, no lymphadenopathy, trachea midline  Respiratory: Clear to auscultation bilaterally, nonlabored respirations   Cardiovascular: RRR, no murmurs, rubs, or gallops, palpable pedal pulses bilaterally  Gastrointestinal: Positive bowel sounds, soft, mild RLQ tenderness, nondistended  Musculoskeletal: 2+ BLE edema, no clubbing or cyanosis to extremities  Psychiatric: Appropriate affect, cooperative  Neurologic: Oriented x 3, strength symmetric in all extremities, Cranial Nerves grossly intact to confrontation, speech clear  Skin: Erythematous wound distal anterior right shin area with serous drainage on dressing.  Blister distal aspect of fifth metatarsal.       Result Review:  I have personally reviewed the results from the time of this admission to 4/22/2024 23:29 EDT and agree with these findings:  [x]  Laboratory list / accordion  []  Microbiology  [x]  Radiology  []  EKG/Telemetry   []  Cardiology/Vascular   []  Pathology  [x]  Old records  []  Other:  Most notable findings include: Hgn 10.1, Hct 33.5, K 5.3, BUN 77, creatinine 1.7    LAB RESULTS:      Lab 04/22/24  1506   WBC 10.31   HEMOGLOBIN 10.1*   HEMATOCRIT 33.5*   PLATELETS 108*   NEUTROS ABS 8.74*    IMMATURE GRANS (ABS) 0.05   LYMPHS ABS 0.38*   MONOS ABS 0.95*   EOS ABS 0.16   MCV 99.4*         Lab 04/22/24  1713 04/22/24  1506   SODIUM  --  137   POTASSIUM  --  5.3*   CHLORIDE  --  108*   CO2  --  17.0*   ANION GAP  --  12.0   BUN  --  77*   CREATININE  --  1.70*   EGFR  --  40.5*   GLUCOSE  --  264*   CALCIUM  --  8.8   MAGNESIUM  --  2.2   TSH 1.940  --          Lab 04/22/24  1506   TOTAL PROTEIN 6.4   ALBUMIN 3.7   GLOBULIN 2.7   ALT (SGPT) 26   AST (SGOT) 18   BILIRUBIN 0.4   ALK PHOS 80         Lab 04/22/24  1713 04/22/24  1506   HSTROP T 119* 123*             Lab 04/22/24  1506   IRON 22*  22*   IRON SATURATION (TSAT) 7*   TIBC 325   TRANSFERRIN 218   FERRITIN 309.80         Brief Urine Lab Results  (Last result in the past 365 days)        Color   Clarity   Blood   Leuk Est   Nitrite   Protein   CREAT   Urine HCG        04/22/24 1554 Yellow   Clear   Moderate (2+)   Small (1+)   Negative   30 mg/dL (1+)                 Microbiology Results (last 10 days)       ** No results found for the last 240 hours. **            XR Foot 3+ View Right    Result Date: 4/22/2024  XR FOOT 3+ VW RIGHT Date of Exam: 4/22/2024 6:54 PM EDT Indication: Diabetic foot wound. Comparison: No comparison. Findings: No definite acute osseous abnormality is noted. There is diffuse soft tissue swelling overlying the foot. Mild irregularity noted along the distal aspect of the great toe. No subcutaneous emphysema noted. Joint spaces demonstrate diffuse mild degenerative change. Soft tissue swelling is more prominent along the dorsum of the foot. Peripheral vascular calcifications are noted     Impression: Impression: Large amount of soft tissue swelling with no definite subcutaneous emphysema or destructive bone lesion. Recommend follow-up as clinically indicated Electronically Signed: Jamin Aguilar MD  4/22/2024 7:31 PM EDT  Workstation ID: [id number]    XR Chest 1 View    Result Date: 4/22/2024  XR CHEST 1 VW Date of Exam:  4/22/2024 2:49 PM EDT Indication: Weak/Dizzy/AMS triage protocol Comparison: Chest radiograph 3/9/2024 Findings: Stable cardiomegaly. Dual-chamber AICD. Median sternotomy and prior CABG. Platelike areas of atelectasis versus scar. No infectious appearing consolidation, edema, large effusion or pneumothorax. Lung volumes are borderline low. Degenerative related osseous changes.     Impression: Impression: No active pulmonary process. Stable radiographic appearance of the chest since 3/9/2024. Electronically Signed: Fran Reis MD  4/22/2024 3:15 PM EDT  Workstation ID: [id number]     Results for orders placed during the hospital encounter of 07/27/22    Adult Transthoracic Echo Complete W/ Cont if Necessary Per Protocol    Interpretation Summary  · Left ventricular ejection fraction appears to be 46 - 50%. Left ventricular systolic function is low normal.  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 Au    Chronic HFrEF (heart failure with reduced ejection fraction)    Anemia, chronic disease    Stage 3b chronic kidney disease    PAF (paroxysmal atrial fibrillation)    Jermaine Singh is a 79 y.o. male with a history of CAD, DM, HTN, HLD, PAF, PVD, RBBB, Asthma, weakness, Hypothyroidism, CKD, presents to the ED with complaints of generalized weakness.      Assessment and Plan:    Acute UTI (POA)  -- UA: Blood moderate, leukocytes small, protein 30, RBC 11-20, WBC 21-50, bacteria 2+  -Rocephin empiric antibiotics; follow urine culture      Urinary retention  BPH  Bladder wall thickening--cystoscopy on 3/22/2024 showed scattered areas of flat erythema most notable on the right lateral wall.  3-4+ trabeculation.  -- Acute urinary retention protocol  -- Flomax  -- Follows  with Dr. Soto with urology    Generalized weakness  -- Fall precautions  -- PT/OT consult  -- Consult case management    Chronic wound RLE  Blister right foot  --X-ray right foot shows large amount of soft tissue swelling with no definite subcutaneous emphysema or destructive bone lesion  -- Consult WOC in the a.m.    T2DM  -- A1c in the a.m.  -- lantus 5 units sq nightly, SSI (titrate prn)    Elevated troponin--chronically elevated (chronically elevate trop >100)  CAD s/p CABG  Parox afib/SSS/pacemaker  HTN  HLD  PVD  RBBB  Chronic HFrEF (EF 42% previous stress test)  -- Chest x-ray shows no active pulmonary process  -- Troponin 123 and 119, delta -4  -- Patient denies chest pain  -- Strict I's and O's  -- Daily weights  -- Continue oral Bumex 1 mg twice daily  -continue eliquis & coreg  -- Aspirin, atorvastatin, Coreg, Imdur, losartan      CKD 3 (baseline cr ~1.6-2.1)  Hyperkalemia  -- Creatinine 1.7, potassium 5.3  --low potassium diet  -lokelma x 1  --recheck potassium level in a.m. (if rising stop ARB and consider more aggressive iv insulin, etc)    Asthma  -currently stable, not in exacerbation  -- Chest x-ray shows no active pulmonary process  -- stable    Anemia  Thrombocytopenia  Iron def  -- Hemoglobin 10.1, hematocrit 33.5, platelets 108  -- No overt signs of bleeding; denies melena or brbpr  -iron level low; start iv iron day #1/3  -b12/folate pending  -monitor/trend hgb and plts    Hypothyroidism  --TSH 1.94; Continue levothyroxine      Am labs: cbc, bmp, A1c (f/u b12/folate, trend hgb, platelet levels, trend potassium)      DVT prophylaxis:  Eliquis    CODE STATUS:    Level Of Support Discussed With: Patient  Code Status (Patient has no pulse and is not breathing): CPR (Attempt to Resuscitate)  Medical Interventions (Patient has pulse or is breathing): Full Support      Expected Discharge  TBD  Expected discharge date/ time has not been documented.      This note has been completed as part of a  split-shared workflow.     Signature: Electronically signed by DAVID Magaña, 04/22/24, 8:24 PM EDT.           Attending   Admission Attestation       I have performed an independent face-to-face diagnostic evaluation including performing an independent physical examination.  I approve of the documented plan of care above that was reviewed and developed with the advanced practice clinician (APC) and take responsibility for that plan along with its associated risks.  I have updated the HPI as appropriate.    Brief HPI    Jermaine Singh is a 80 yo m w/ hx cad (remote cabg), parox afib/sss/pacemaker, HFrEF, DM2, PAD, previous toe amputations, chronic right foot wound, ckd 3 (baseline cr ~1.6-2.1), chronically elevated troponins, hypothyroidism. Patient presents w/ profound worsening generalized weakness and recent dysuria. Workup revealed wbc 10,310, hgb 10, plts 108,000, iron level 22, iron sat 7%, creatinine 1.7, potassium 5.3 (w/ slight hemolysis), lft's normal. U/a (straight cath) showed LE +, 11-20 rbc, 21-50 wbc, 2+ bacteria. Troponin was 123, repeat 119. Cxr negative for acute process, right foot xray soft tissue edema but no emphysema or destructive lesion noted. Initiated on rocephin and admitted to hospitalist service.     Attending Physical Exam:  Temp:  [97.5 °F (36.4 °C)] 97.5 °F (36.4 °C)  Heart Rate:  [84-86] 86  Resp:  [16] 16  BP: (107-130)/(70-74) 110/72    Constitutional:Alert, oriented x 3,appears fatigued; elderly, frail but nontoxic appearing, normal work of breathing at rest  Psych:Normal/appropriate affect  HEENT:NCAT, oropharynx clear  Neck: neck supple, full range of motion  Neuro: Face symmetric, speech clear, equal , moves all extremities  Cardiac: rrr; BLE 1+ pitting edema  Resp: CTAB, normal effort  GI: abd soft, nontender  Skin: right foot lateral plantal surface w/ blistering but no overt purulent drainage, no tunneling lesion            Result Review:  I have personally  reviewed the results from the time of this admission to 2024 23:29 EDT and agree with these findings:  [x]  Laboratory list / accordion  []  Microbiology  [x]  Radiology  [x]  EKG/Telemetry   []  Cardiology/Vascular   []  Pathology  [x]  Old records  []  Other:  Most notable findings include: see hpi and above    Assessment and Plan:    See assessment and plan documented by APC above and updated/edited by me as appropriate.    Artur Avila MD  24                     Electronically signed by Artur Avila MD at 24 [id number]          Physician Progress Notes (most recent note)        Luz Elena Batista DO at 24 1328              [facility] Medicine Services  PROGRESS NOTE    Patient Name: Jermaine Singh  : 1945  MRN: [id number]    Date of Admission: 2024  Primary Care Physician: Marysol Shrestha MD    Subjective   Subjective     CC:  F/U UTI    HPI:  Patient seen and examined. No new issues overnight. Still having burning with urination. Per wife, patient has appt with Dr vences tomorrow. Had a recent cystoscopy. Also follows with Dr chuck hernandez for his cellulitis.     Objective   Objective     Vital Signs:   Temp:  [97.5 °F (36.4 °C)-98.9 °F (37.2 °C)] 98.9 °F (37.2 °C)  Heart Rate:  [67-92] 73  Resp:  [16] 16  BP: ()/(55-91) 100/69     Physical Exam:  Constitutional: No acute distress, awake, alert  HENT: NCAT, mucous membranes moist  Respiratory: Clear to auscultation bilaterally, respiratory effort normal   Cardiovascular: RRR, no murmurs, rubs, or gallops  Gastrointestinal: Positive bowel sounds, soft, nontender, nondistended  Musculoskeletal: No bilateral ankle edema  Psychiatric: Flat affect, cooperative  Neurologic: Oriented x 3, ALBARADO, speech clear  Skin: RLE erythema, fluid filled blister R great toe    Results Reviewed:  LAB RESULTS:      Lab 24  0550 24  1506   WBC 11.83* 10.31   HEMOGLOBIN 9.8* 10.1*   HEMATOCRIT 31.3*  33.5*   PLATELETS 93* 108*   NEUTROS ABS 10.51* 8.74*   IMMATURE GRANS (ABS) 0.09* 0.05   LYMPHS ABS 0.37* 0.38*   MONOS ABS 0.83 0.95*   EOS ABS 0.01 0.16   MCV 95.4 99.4*         Lab 04/23/24  0550 04/22/24  1713 04/22/24  1506   SODIUM 138  --  137   POTASSIUM 4.9  --  5.3*   CHLORIDE 107  --  108*   CO2 14.0*  --  17.0*   ANION GAP 17.0*  --  12.0   BUN 77*  --  77*   CREATININE 1.85*  --  1.70*   EGFR 36.6*  --  40.5*   GLUCOSE 278*  --  264*   CALCIUM 8.7  --  8.8   MAGNESIUM  --   --  2.2   HEMOGLOBIN A1C 8.50*  --   --    TSH  --  1.940  --          Lab 04/22/24  1506   TOTAL PROTEIN 6.4   ALBUMIN 3.7   GLOBULIN 2.7   ALT (SGPT) 26   AST (SGOT) 18   BILIRUBIN 0.4   ALK PHOS 80         Lab 04/22/24  1713 04/22/24  1506   HSTROP T 119* 123*             Lab 04/22/24  1836 04/22/24  1506   IRON  --  22*  22*   IRON SATURATION (TSAT)  --  7*   TIBC  --  325   TRANSFERRIN  --  218   FERRITIN  --  309.80   FOLATE 11.40  --    VITAMIN B 12 704  --          Brief Urine Lab Results  (Last result in the past 365 days)        Color   Clarity   Blood   Leuk Est   Nitrite   Protein   CREAT   Urine HCG        04/22/24 1554 Yellow   Clear   Moderate (2+)   Small (1+)   Negative   30 mg/dL (1+)                   Microbiology Results Abnormal       Procedure Component Value - Date/Time    Urine Culture - Urine, Straight Cath [[id number]]  (Normal) Collected: 04/22/24 1554    Lab Status: Preliminary result Specimen: Urine from Straight Cath Updated: 04/23/24 1034     Urine Culture No growth            XR Foot 3+ View Right    Result Date: 4/22/2024  XR FOOT 3+ VW RIGHT Date of Exam: 4/22/2024 6:54 PM EDT Indication: Diabetic foot wound. Comparison: No comparison. Findings: No definite acute osseous abnormality is noted. There is diffuse soft tissue swelling overlying the foot. Mild irregularity noted along the distal aspect of the great toe. No subcutaneous emphysema noted. Joint spaces demonstrate diffuse mild degenerative  change. Soft tissue swelling is more prominent along the dorsum of the foot. Peripheral vascular calcifications are noted     Impression: Impression: Large amount of soft tissue swelling with no definite subcutaneous emphysema or destructive bone lesion. Recommend follow-up as clinically indicated Electronically Signed: Jamin Aguilar MD  4/22/2024 7:31 PM EDT  Workstation ID: [id number]    XR Chest 1 View    Result Date: 4/22/2024  XR CHEST 1 VW Date of Exam: 4/22/2024 2:49 PM EDT Indication: Weak/Dizzy/AMS triage protocol Comparison: Chest radiograph 3/9/2024 Findings: Stable cardiomegaly. Dual-chamber AICD. Median sternotomy and prior CABG. Platelike areas of atelectasis versus scar. No infectious appearing consolidation, edema, large effusion or pneumothorax. Lung volumes are borderline low. Degenerative related osseous changes.     Impression: Impression: No active pulmonary process. Stable radiographic appearance of the chest since 3/9/2024. Electronically Signed: Fran Reis MD  4/22/2024 3:15 PM EDT  Workstation ID: [id number]     Results for orders placed during the hospital encounter of 07/27/22    Adult Transthoracic Echo Complete W/ Cont if Necessary Per Protocol    Interpretation Summary  · Left ventricular ejection fraction appears to be 46 - 50%. Left ventricular systolic function is low normal.  · Left ventricular septal hypokinesis  · No significant valvular heart disease      Current medications:  Scheduled Meds:apixaban, 5 mg, Oral, Q12H  aspirin, 81 mg, Oral, Daily  atorvastatin, 20 mg, Oral, Nightly  azelastine, 2 spray, Each Nare, BID  bumetanide, 1 mg, Oral, BID  carvedilol, 3.125 mg, Oral, BID With Meals  cefTRIAXone, 1,000 mg, Intravenous, Q24H  ferric gluconate, 125 mg, Intravenous, Daily  insulin glargine, 5 Units, Subcutaneous, Nightly  insulin lispro, 2-9 Units, Subcutaneous, 4x Daily AC & at Bedtime  isosorbide mononitrate, 30 mg, Oral, Daily  lactobacillus acidophilus, 1 capsule,  Oral, Daily  levothyroxine, 88 mcg, Oral, Q AM  losartan, 50 mg, Oral, Daily  pantoprazole, 40 mg, Oral, Q AM  polyethylene glycol, 17 g, Oral, Daily  sodium chloride, 10 mL, Intravenous, Q12H  tamsulosin, 0.4 mg, Oral, Daily      Continuous Infusions:   PRN Meds:.  acetaminophen    senna-docusate sodium **AND** polyethylene glycol **AND** bisacodyl **AND** bisacodyl    dextrose    dextrose    famotidine    glucagon (human recombinant)    Magnesium Low Dose Replacement - Follow Nurse / BPA Driven Protocol    nitroglycerin    prochlorperazine    sodium chloride    sodium chloride    sodium chloride    Assessment & Plan   Assessment & Plan     Active Hospital Problems    Diagnosis  POA    **UTI (urinary tract infection) [N39.0]  Yes    PAF (paroxysmal atrial fibrillation) [I48.0]  Yes    Anemia, chronic disease [D63.8]  Yes    Stage 3b chronic kidney disease [N18.32]  Yes    Chronic HFrEF (heart failure with reduced ejection fraction) [I50.22]  Yes    Type 2 diabetes mellitus [E11.9]  Yes    Hypertension [I10]  Yes    Hyperlipidemia [E78.5]  Yes    Hypothyroidism (acquired) [E03.9]  Yes    RBBB [I45.10]  Yes    S/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 Au [Z95.1]  Not Applicable      Resolved Hospital Problems   No resolved problems to display.        Brief Hospital Course to date:  Jermaine Singh is a 79 y.o. male with a history of CAD, DM, HTN, HLD, PAF, PVD, RBBB, Asthma, weakness, Hypothyroidism, CKD, presents to the ED with complaints of generalized weakness.      This patient's problems and plans were partially entered by my partner and updated as appropriate by me 04/23/24.   All problems are new to me today     Assessment and Plan:     Acute UTI (POA)  -- UA: Blood moderate, leukocytes small, protein 30, RBC 11-20, WBC 21-50, bacteria 2+  -Urine Cx with no growth  -Symptomatic with dysuria   -Continue Rocephin for now, ID to see   -Patient had UA at PCP 3/5/24 took ~ 4 days of Bactrim. Was prescribed course of  Fluconazole as Cx + yeast which he completed. Also had UA recently at Dr Soto's office, no growth. Continues to have dysuria.  All prior Cx in our system + yeast.      Urinary retention  BPH  Bladder wall thickening--cystoscopy on 3/22/2024 showed scattered areas of flat erythema most notable on the right lateral wall.  3-4+ trabeculation.  -- Acute urinary retention protocol, bladder scan q shift   -- Flomax  -- Follows with Dr. Soto with urology, will consult as patient supposed to follow-up with him in office tomorrow     Generalized weakness  -- Fall precautions  -- PT/OT, PT recommends IPR. Pt/wife want [facility], discussed with CM     Chronic cellulitis RLE  Blister right foot  --X-ray right foot shows large amount of soft tissue swelling with no definite subcutaneous emphysema or destructive bone lesion  -- WOC has seen  -- Followed by ID, Dr Padilla  -- Will ask PT wound to see for unna boot      T2DM  -- A1c 8.5  -- Continue MDSSI  -- Add Humalog 5 units TID meals  -- Increase Lantus to 10 units nightly      Elevated troponin--chronically elevated (chronically elevate trop >100)  CAD s/p CABG  Parox afib/SSS/pacemaker  HTN  HLD  Severe PVD, Hx multiple toe amputations R foot   RBBB  Chronic HFrEF (EF 42% previous stress test)  -- Strict I's and O's  -- Daily weights  -- Aspirin, atorvastatin,   -- Hold Coreg, Imdur, losartan, Bumex due to low BP      CKD 3 (baseline cr ~1.6-2.1)  Hyperkalemia -> resolved   -Cr at baseline  -Potassium normalized, pt refused Lokelma      Asthma  -currently stable, not in exacerbation  -- Chest x-ray shows no active pulmonary process     Anemia  Iron def  -On IV iron (2/3)  -H&H stable    TCP  -new from prior admission  -Trend  -If continues to drop, will order further work-up     Hypothyroidism  --TSH 1.94; Continue levothyroxine    Expected Discharge Location and Transportation: Rehab  Expected Discharge   Expected Discharge Date: 4/25/2024; Expected Discharge Time:      DVT  prophylaxis:  Medical DVT prophylaxis orders are present.         AM-PAC 6 Clicks Score (PT): 16 (24 1119)    CODE STATUS:   Code Status and Medical Interventions:   Ordered at: 24     Level Of Support Discussed With:    Patient     Code Status (Patient has no pulse and is not breathing):    CPR (Attempt to Resuscitate)     Medical Interventions (Patient has pulse or is breathing):    Full Support       Luz Elena Batista DO  24        Electronically signed by Luz Elena Batista DO at 24 1344          Physical Therapy Notes (most recent note)        Tiffanie Erwin, PT at 24 1043  Version 1 of 1         Patient Name: Jermaine Singh  : 1945    MRN: [id number]                              Today's Date: 2024       Admit Date: 2024    Visit Dx:     ICD-10-CM ICD-9-CM   1. Acute UTI  N39.0 599.0   2. Generalized weakness  R53.1 780.79   3. Edema of right lower extremity  R60.0 782.3   4. Leg erythema  L53.9 695.9   5. Chronic kidney disease, unspecified CKD stage  N18.9 585.9   6. Elevated troponin  R79.89 790.6   7. 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 Au    Atherosclerosis of native artery of both lower extremities with intermittent claudication    SSS (sick sinus syndrome)    Chronic HFrEF (heart failure with reduced ejection fraction)    DVT, bilateral lower limbs    Cellulitis of right lower extremity    Anemia, chronic disease    PVD (peripheral vascular disease)    Status post amputation of great toe, left    Diabetic foot ulcer    GERD without esophagitis    Stage 3b chronic kidney disease    Arthralgia of shoulder    Right inguinal hernia    Diabetic foot infection    Status post amputation of great toe and second toe of left foot     PAF (paroxysmal atrial fibrillation)    Moderate malnutrition    UTI (urinary tract infection)     Past Medical History:   Diagnosis Date    Allergic     Arthritis     Asthma     Coronary artery disease     Diabetes mellitus 2000    started on inuslin 12/2018; started on po meds in 2000; checking blood sugars daily     Disease of thyroid gland     po meds daily for hypothyroidism     Elevated serum creatinine 11/15/2022    Elevated troponin 10/02/2022    Generalized weakness 11/15/2022    History of fracture as a child     rt leg- severe     Hyperlipidemia     Hypertension     Hypothyroidism     PAF (paroxysmal atrial fibrillation) 07/23/2023    Peripheral neuropathy     Peripheral vascular disease     s/p angiogram 2/19-needs stent in left leg     Pleural effusion, bilateral 11/15/2022    Proximal phalanx fracture of the second digit extending into the second metatarsal joint 07/28/2022    RBBB     Right knee pain     Unstable angina 02/12/2019    Added automatically from request for surgery [id number]    Vitamin D deficiency      Past Surgical History:   Procedure Laterality Date    AMPUTATION DIGIT Left 01/17/2023    Procedure: AMPUTATION DIGIT LEFT;  Surgeon: Gopal Márquez MD;  Location:  [facility];  Service: Vascular;  Laterality: Left;    APPENDECTOMY      CARDIAC CATHETERIZATION N/A 02/14/2019    Procedure: Left Heart Cath;  Surgeon: Cooper Apodaca MD;  Location:  [facility] CATH INVASIVE LOCATION;  Service: Cardiology    CARDIAC ELECTROPHYSIOLOGY PROCEDURE N/A 05/18/2022    Procedure: DEVICE IMPLANT;  Surgeon: Cooper Apodaca MD;  Location:  [facility]ATH INVASIVE [facility]ATION;  Service: Cardiology;  Laterality: N/A;    CARDIAC SURGERY      COLONOSCOPY      CORONARY ARTERY BYPASS GRAFT N/A 03/22/2019    Procedure: MEDIAN STERNOTOMY, CORONARY ARTERY BYPASS GRAFT X3, UTILIZING THE LEFT INTERNAL MAMMARY ARTERY, EVH AND OPEN HARVEST OF THE RIGHT GREATER SAPHENOUS VEIN, EXPLORATION OF THE LEFT LEG;  Surgeon:  Ap Au MD;  Location:  [facility];  Service: Cardiothoracic    EYE SURGERY Bilateral     cataracts     FRACTURE SURGERY      INTERVENTIONAL RADIOLOGY PROCEDURE N/A 07/29/2021    Procedure: Abdominal Aortagram with Runoff;  Surgeon: Jermaine Hernandez MD;  Location:  [facility] IN[facility]AS[facility];  Service: Cardiovascular;  Laterality: N/A;    KNEE ARTHROSCOPY      right x 2, left x 1    LACERATION REPAIR      right leg    LEG SURGERY      2 for fracture of rt leg     TONSILLECTOMY      Adnoidectomy    TRANS METATARSAL AMPUTATION Left 08/02/2022    Procedure: GREAT TOE AMPUTATION LEFT;  Surgeon: Gopal Márquez MD;  Location:  [facility];  Service: Vascular;  Laterality: Left;      General Information       Row Name 04/23/24 1114          Physical Therapy Time and Intention    Document Type evaluation  -KR     Mode of Treatment physical therapy  -KR       Row Name 04/23/24 1114          General Information    Patient Profile Reviewed yes  -KR     Prior Level of Function independent:;gait;transfer;mod assist:;ADL's;dependent:;w/c or scooter  ambulates short distances in the home with FWW, depA for manual wc propulsion in community. Currently receiving  PT/OT.  -KR     Existing Precautions/Restrictions fall  -KR     Barriers to Rehab medically complex;previous functional deficit  -KR       Row Name 04/23/24 1114          Living Environment    People in Home spouse  -KR       Row Name 04/23/24 1114          Home Main Entrance    Number of Stairs, Main Entrance four  -KR     Stair Railings, Main Entrance railings on both sides of stairs  -KR       Row Name 04/23/24 1114          Stairs Within Home, Primary    Number of Stairs, Within Home, Primary none  -KR       Row Name 04/23/24 1114          Cognition    Orientation Status (Cognition) oriented x 3  -KR       Row Name 04/23/24 1114          Safety Issues, Functional Mobility    Safety Issues Affecting Function (Mobility) insight into  deficits/self-awareness;sequencing abilities  -KR     Impairments Affecting Function (Mobility) balance;endurance/activity tolerance;strength  -KR               User Key  (r) = Recorded By, (t) = Taken By, (c) = Cosigned By      Initials Name Provider Type    Tiffanie Sánchez PT Physical Therapist                   Mobility       Row Name 04/23/24 1115          Sit-Stand Transfer    Sit-Stand [place] (Transfers) minimum assist (75% patient effort);2 person assist  -KR     Assistive Device (Sit-Stand Transfers) walker, front-wheeled  -KR     Comment, (Sit-Stand Transfer) 1x from chair with cues for UE push up to stand and controlled lowering to sit.  -KR       Row Name 04/23/24 1115          Gait/Stairs (Locomotion)    [place] Level (Gait) minimum assist (75% patient effort);1 person assist;1 person to manage equipment  -KR     Assistive Device (Gait) walker, front-wheeled  -KR     Distance in Feet (Gait) 8  -KR     Deviations/Abnormal Patterns (Gait) ashli decreased;gait speed decreased;stride length decreased;weight shifting decreased;bilateral deviations  -KR     Bilateral Gait Deviations forward flexed posture;heel strike decreased  -KR     Comment, (Gait/Stairs) chair follow by 2nd person for safety. Farther distance limited by fatigue/weakness.  -KR               User Key  (r) = Recorded By, (t) = Taken By, (c) = Cosigned By      Initials Name Provider Type    Tiffanie Sánchez PT Physical Therapist                   Obj/Interventions       Row Name 04/23/24 1117          Range of Motion Comprehensive    General Range of Motion lower extremity range of motion deficits identified  -KR     Comment, General Range of Motion B knee extension limited by ~10 degrees  -KR       Row Name 04/23/24 1117          Strength Comprehensive (MMT)    General Manual Muscle Testing (MMT) Assessment lower extremity strength deficits identified  -KR     Comment, General Manual Muscle Testing (MMT) Assessment BLEs  grossly 4-/5 within available ROM  -KR       Row Name 04/23/24 1117          Balance    Balance Assessment sitting static balance;sitting dynamic balance;standing static balance;standing dynamic balance  -KR     Static Sitting Balance standby assist  -KR     Dynamic Sitting Balance contact guard  -KR     Position, Sitting Balance unsupported;sitting in chair  -KR     Static Standing Balance contact guard  -KR     Dynamic Standing Balance minimal assist;1-person assist;verbal cues;non-verbal cues (demo/gesture)  -KR     Position/Device Used, Standing Balance supported;walker, front-wheeled  -KR     Balance Interventions sitting;standing;sit to stand;supported;static;dynamic  -KR       Row Name 04/23/24 1117          Sensory Assessment (Somatosensory)    Sensory Assessment (Somatosensory) LE sensation intact  -KR               User Key  (r) = Recorded By, (t) = Taken By, (c) = Cosigned By      Initials Name Provider Type    Tiffanie Sánchez, PT Physical Therapist                   Goals/Plan       Row Name 04/23/24 1118          Bed Mobility Goal 1 (PT)    Activity/Assistive Device (Bed Mobility Goal 1, PT) bed mobility activities, all  -KR     [place] Level/Cues Needed (Bed Mobility Goal 1, PT) independent  -KR     Time Frame (Bed Mobility Goal 1, PT) long term goal (LTG);2 weeks  -KR       Row Name 04/23/24 1118          Transfer Goal 1 (PT)    Activity/Assistive Device (Transfer Goal 1, PT) sit-to-stand/stand-to-sit;bed-to-chair/chair-to-bed;walker, rolling  -KR     [place] Level/Cues Needed (Transfer Goal 1, PT) modified independence  -KR     Time Frame (Transfer Goal 1, PT) long term goal (LTG);2 weeks  -KR       Row Name 04/23/24 1118          Gait Training Goal 1 (PT)    Activity/Assistive Device (Gait Training Goal 1, PT) gait (walking locomotion);improve balance and speed;increase endurance/gait distance;assistive device use;walker, rolling  -KR     [place] Level (Gait Training Goal 1, PT)  standby assist  -KR     Distance (Gait Training Goal 1, PT) 50  -KR     Time Frame (Gait Training Goal 1, PT) long term goal (LTG);2 weeks  -KR       Row Name 04/23/24 1118          Stairs Goal 1 (PT)    Activity/Assistive Device (Stairs Goal 1, PT) stairs, all skills;using handrail, left;using handrail, right  -KR     [place] Level/Cues Needed (Stairs Goal 1, PT) contact guard required  -KR     Number of Stairs (Stairs Goal 1, PT) 4  -KR     Time Frame (Stairs Goal 1, PT) long term goal (LTG);2 weeks  -KR       Row Name 04/23/24 1118          Therapy Assessment/Plan (PT)    Planned Therapy Interventions (PT) balance training;bed mobility training;gait training;home exercise program;ROM (range of motion);stair training;strengthening;stretching;transfer training;postural re-education;patient/family education  -KR               User Key  (r) = Recorded By, (t) = Taken By, (c) = Cosigned By      Initials Name Provider Type    KR Tiffanie Erwin, PT Physical Therapist                   Clinical Impression       Row Name 04/23/24 1117          Pain    Additional Documentation Pain Scale: FACES Pre/Post-Treatment (Group)  -KR       Row Name 04/23/24 1117          Pain Scale: FACES Pre/Post-Treatment    Pain: FACES Scale, Pretreatment 0-->no hurt  -KR     Posttreatment Pain Rating 0-->no hurt  -KR       Row Name 04/23/24 1117          Plan of Care Review    Plan of Care Reviewed With patient;spouse  -KR     Outcome Evaluation Pt presents with strength, balance, and endurance below baseline contributing to bed mobility, transfer, and ambulation deficits. Pt will benefit from PT to address aforementioned deficits and return to PLOF. PT rec IRF upon dc.  -KR       Row Name 04/23/24 1117          Therapy Assessment/Plan (PT)    Rehab Potential (PT) good, to achieve stated therapy goals  -KR     Criteria for Skilled Interventions Met (PT) yes;skilled treatment is necessary  -KR     Therapy Frequency (PT) daily  -KR        Row Name 04/23/24 1117          Vital Signs    Pre Systolic BP Rehab 95  -KR     Pre Treatment Diastolic BP 63  -KR     Post Systolic BP Rehab 100  -KR     Post Treatment Diastolic BP 69  -KR     Pre Patient Position Sitting  -KR     Intra Patient Position Standing  -KR     Post Patient Position Sitting  -KR       Row Name 04/23/24 1117          Positioning and Restraints    Pre-Treatment Position sitting in chair/recliner  -KR     Post Treatment Position chair  -KR     In Chair notified nsg;reclined;call light within reach;encouraged to call for assist;exit alarm on;with family/caregiver;waffle cushion;on mechanical lift sling;legs elevated  -KR               User Key  (r) = Recorded By, (t) = Taken By, (c) = Cosigned By      Initials Name Provider Type    Tiffanie Sánchez, PT Physical Therapist                   Outcome Measures       Row Name 04/23/24 1119          How much help from another person do you currently need...    Turning from your back to your side while in flat bed without using bedrails? 3  -KR     Moving from lying on back to sitting on the side of a flat bed without bedrails? 2  -KR     Moving to and from a bed to a chair (including a wheelchair)? 3  -KR     Standing up from a chair using your arms (e.g., wheelchair, bedside chair)? 3  -KR     Climbing 3-5 steps with a railing? 2  -KR     To walk in hospital room? 3  -KR     AM-PAC 6 Clicks Score (PT) 16  -KR     Highest Level of Mobility Goal 5 --> Static standing  -KR       Row Name 04/23/24 1119          Modified McPherson Scale    Pre-Stroke Modified McPherson Scale 6 - Unable to determine (UTD) from the medical record documentation  -KR     Modified Deena Scale 4 - Moderately severe disability.  Unable to walk without assistance, and unable to attend to own bodily needs without assistance.  -KR       Row Name 04/23/24 1119          Functional Assessment    Outcome Measure Options Modified McPherson  -KR               User Key  (r) = Recorded By,  (t) = Taken By, (c) = Cosigned By      Initials Name Provider Type    Tiffanie Sánchez PT Physical Therapist                                 Physical Therapy Education       Title: PT OT SLP Therapies (In Progress)       Topic: Physical Therapy (In Progress)       Point: Mobility training (Done)       Learning Progress Summary             Patient Acceptance, E, VU by KR at 4/23/2024 1120   Family Acceptance, E, VU by KR at 4/23/2024 1120                         Point: Home exercise program (Not Started)       Learner Progress:  Not documented in this visit.              Point: Body mechanics (Done)       Learning Progress Summary             Patient Acceptance, E, VU by KR at 4/23/2024 1120   Family Acceptance, E, VU by KR at 4/23/2024 1120                         Point: Precautions (Done)       Learning Progress Summary             Patient Acceptance, E, VU by KR at 4/23/2024 1120   Family Acceptance, E, VU by KR at 4/23/2024 1120                                         User Key       Initials Effective Dates Name Provider Type Discipline    ELENITA 12/30/22 -  Tiffanie Erwin PT Physical Therapist PT                  PT Recommendation and Plan  Planned Therapy Interventions (PT): balance training, bed mobility training, gait training, home exercise program, ROM (range of motion), stair training, strengthening, stretching, transfer training, postural re-education, patient/family education  Plan of Care Reviewed With: patient, spouse  Outcome Evaluation: Pt presents with strength, balance, and endurance below baseline contributing to bed mobility, transfer, and ambulation deficits. Pt will benefit from PT to address aforementioned deficits and return to PLOF. PT rec IRF upon dc.     Time Calculation:   PT Evaluation Complexity  History, PT Evaluation Complexity: 3 or more personal factors and/or comorbidities  Examination of Body Systems (PT Eval Complexity): total of 4 or more elements  Clinical Presentation (PT  Evaluation Complexity): stable  Clinical Decision Making (PT Evaluation Complexity): low complexity  Overall Complexity (PT Evaluation Complexity): low complexity     PT Charges       Row Name 04/23/24 1120             Time Calculation    Start Time 1043  -KR      PT Received On 04/23/24  -KR      PT Goal Re-Cert Due Date 05/03/24  -KR         Untimed Charges    PT Eval/Re-eval Minutes 48  -KR         Total Minutes    Untimed Charges Total Minutes 48  -KR       Total Minutes 48  -KR                User Key  (r) = Recorded By, (t) = Taken By, (c) = Cosigned By      Initials Name Provider Type    Tiffanie Sánchez PT Physical Therapist                  Therapy Charges for Today       Code Description Service Date Service Provider Modifiers Qty    92536062856 HC PT EVAL LOW COMPLEXITY 4 4/23/2024 Tiffanie Erwin, PT GP 1            PT G-Codes  Outcome Measure Options: Modified Deena  AM-PAC 6 Clicks Score (PT): 16  Modified Deena Scale: 4 - Moderately severe disability.  Unable to walk without assistance, and unable to attend to own bodily needs without assistance.  PT Discharge Summary  Anticipated Discharge Disposition (PT): inpatient rehabilitation facility    Tiffanie Erwin PT  4/23/2024      Electronically signed by Tiffanie Erwin PT at 04/23/24 1121          Occupational Therapy Notes (most recent note)        Gin Jaeger OT at 04/23/24 1030          Goal Outcome Evaluation:  Plan of Care Reviewed With: patient, spouse        Progress: no change  Outcome Evaluation: Pt presents below his functional baseline with deficits including generalized weakness, impaired balance, decreased activity tolerance, and impaired ADLs warranting skilled OT services. Pt required Mod A for bed mobility and Min A x 2 for functional transfers using the RW. Rec IPR at [place].      Anticipated Discharge Disposition (OT): inpatient rehabilitation facility                          Electronically signed by Gin Jaeger OT  at 04/23/24 1347       Speech Language Pathology Notes (most recent note)    No notes exist for this encounter.        Marilu Jones, RN   Registered Nurse  Wound Care     Nursing Note     Addendum     Date of Service: 04/23/24 1246  Creation Time: 04/23/24 1246       [facility] consult for RLE and right foot blister.     Per family member and patient, patient previously with traumatic laceration to right leg causing more swelling in right leg.      Right leg presents with venous stasis dermatitis patch on anterior shin, mild erythema, no warmth, macerated and fragile skin. No firmness, odor, pain. Small amount of serous drainage. Papular appearing rash, no pruritis, although patient states he cannot feel this area well.      Cleansed with VASHE soak to rebalance skin and knock back pathogens. Applied Hydrofiber AG per family's request, covered with silicone foam dressing. Recommend q3 day dressing changes per nursing. Can also consider topical steroids if rash progresses.      Right foot blister appears intact, likely from trauma. Recommend leaving blister intact, can paint with betadine.     Heels were intact, patient cannot feel feet so allevyn life dressings placed, refused heel boots despite education.     Will sign off.                     Revision History

## 2024-04-24 NOTE — CASE MANAGEMENT/SOCIAL WORK
Continued Stay Note  [facility]     Patient Name: Jermaine Singh  MRN: [id number]  Today's Date: 4/24/2024    Admit Date: 4/22/2024    Plan: Rehab   Discharge Plan       Row Name 04/24/24 1222       Plan    Plan Rehab    Patient/Family in Agreement with Plan yes    Plan Comments Spoke with Mr Singh at the bedside, and Spouse by phone. PT is recommending rehab. Discussed with family and they are interested in [facility] and [facility]. Referral given to Nitza with [facility] and faxed to [facility] at [phone]. CM will continue to follow up.    Final Discharge Disposition Code 62 - inpatient rehab facility                   Discharge Codes    No documentation.                 Expected Discharge Date and Time       Expected Discharge Date Expected Discharge Time    Apr 26, 2024               Tiana Tovar RN     Travel screening done

## 2024-04-24 NOTE — PROGRESS NOTES
[facility]  PROGRESS NOTE    Patient Name: Jermaine Singh  : 1945  MRN: [id number]    Date of Admission: 2024  Primary Care Physician: Marysol Shrestha MD    Subjective   Subjective     CC:  F/U UTI    HPI:  Patient seen and examined. Up in chair. Feels better. Dysuria improved. Showed me a picture of his dog Ricardo. We talked about his Farm. In good spirits.     Objective   Objective     Vital Signs:   Temp:  [97.9 °F (36.6 °C)-98.5 °F (36.9 °C)] 98.4 °F (36.9 °C)  Heart Rate:  [63-90] 86  Resp:  [16-18] 18  BP: ()/(54-75) 110/75     Physical Exam:  Constitutional: No acute distress, awake, alert  HENT: NCAT, mucous membranes moist  Respiratory: Clear to auscultation bilaterally, respiratory effort normal   Cardiovascular: RRR, no murmurs, rubs, or gallops  Gastrointestinal: Positive bowel sounds, soft, nontender, nondistended  Musculoskeletal: No bilateral ankle edema  Psychiatric: Flat affect, cooperative  Neurologic: Oriented x 3, ALBARADO, speech clear  Skin: RLE covered with bandage, fluid filled blister R great toe    Results Reviewed:  LAB RESULTS:      Lab 24  0548 24  0550 24  1506   WBC 8.82 11.83* 10.31   HEMOGLOBIN 9.4* 9.8* 10.1*   HEMATOCRIT 28.8* 31.3* 33.5*   PLATELETS 104* 93* 108*   NEUTROS ABS  --  10.51* 8.74*   IMMATURE GRANS (ABS)  --  0.09* 0.05   LYMPHS ABS  --  0.37* 0.38*   MONOS ABS  --  0.83 0.95*   EOS ABS  --  0.01 0.16   MCV 92.3 95.4 99.4*         Lab 24  0548 24  0550 24  1713 24  1506   SODIUM 134* 138  --  137   POTASSIUM 4.8 4.9  --  5.3*   CHLORIDE 105 107  --  108*   CO2 17.0* 14.0*  --  17.0*   ANION GAP 12.0 17.0*  --  12.0   BUN 80* 77*  --  77*   CREATININE 2.06* 1.85*  --  1.70*   EGFR 32.2* 36.6*  --  40.5*   GLUCOSE 74 278*  --  264*   CALCIUM 8.2* 8.7  --  8.8   MAGNESIUM  --   --   --  2.2   HEMOGLOBIN A1C  --  8.50*  --   --    TSH  --   --  1.940  --          Lab  04/22/24  1506   TOTAL PROTEIN 6.4   ALBUMIN 3.7   GLOBULIN 2.7   ALT (SGPT) 26   AST (SGOT) 18   BILIRUBIN 0.4   ALK PHOS 80         Lab 04/22/24  1713 04/22/24  1506   HSTROP T 119* 123*             Lab 04/22/24  1836 04/22/24  1506   IRON  --  22*  22*   IRON SATURATION (TSAT)  --  7*   TIBC  --  325   TRANSFERRIN  --  218   FERRITIN  --  309.80   FOLATE 11.40  --    VITAMIN B 12 704  --          Brief Urine Lab Results  (Last result in the past 365 days)        Color   Clarity   Blood   Leuk Est   Nitrite   Protein   CREAT   Urine HCG        04/22/24 1554 Yellow   Clear   Moderate (2+)   Small (1+)   Negative   30 mg/dL (1+)                   Microbiology Results Abnormal       Procedure Component Value - Date/Time    Urine Culture - Urine, Straight Cath [[id number]]  (Normal) Collected: 04/22/24 1554    Lab Status: Final result Specimen: Urine from Straight Cath Updated: 04/23/24 2147     Urine Culture No growth            XR Foot 3+ View Right    Result Date: 4/22/2024  XR FOOT 3+ VW RIGHT Date of Exam: 4/22/2024 6:54 PM EDT Indication: Diabetic foot wound. Comparison: No comparison. Findings: No definite acute osseous abnormality is noted. There is diffuse soft tissue swelling overlying the foot. Mild irregularity noted along the distal aspect of the great toe. No subcutaneous emphysema noted. Joint spaces demonstrate diffuse mild degenerative change. Soft tissue swelling is more prominent along the dorsum of the foot. Peripheral vascular calcifications are noted     Impression: Impression: Large amount of soft tissue swelling with no definite subcutaneous emphysema or destructive bone lesion. Recommend follow-up as clinically indicated Electronically Signed: Jamin Aguilar MD  4/22/2024 7:31 PM EDT  Workstation ID: [id number]    XR Chest 1 View    Result Date: 4/22/2024  XR CHEST 1 VW Date of Exam: 4/22/2024 2:49 PM EDT Indication: Weak/Dizzy/AMS triage protocol Comparison: Chest radiograph 3/9/2024  Findings: Stable cardiomegaly. Dual-chamber AICD. Median sternotomy and prior CABG. Platelike areas of atelectasis versus scar. No infectious appearing consolidation, edema, large effusion or pneumothorax. Lung volumes are borderline low. Degenerative related osseous changes.     Impression: Impression: No active pulmonary process. Stable radiographic appearance of the chest since 3/9/2024. Electronically Signed: Fran Reis MD  4/22/2024 3:15 PM EDT  Workstation ID: [id number]     Results for orders placed during the hospital encounter of 07/27/22    Adult Transthoracic Echo Complete W/ Cont if Necessary Per Protocol    Interpretation Summary  · Left ventricular ejection fraction appears to be 46 - 50%. Left ventricular systolic function is low normal.  · Left ventricular septal hypokinesis  · No significant valvular heart disease      Current medications:  Scheduled Meds:apixaban, 5 mg, Oral, Q12H  aspirin, 81 mg, Oral, Daily  atorvastatin, 20 mg, Oral, Nightly  azelastine, 2 spray, Each Nare, BID  [Held by provider] bumetanide, 1 mg, Oral, BID  [Held by provider] carvedilol, 3.125 mg, Oral, BID With Meals  cefTRIAXone, 1,000 mg, Intravenous, Q24H  ferric gluconate, 125 mg, Intravenous, Daily  insulin glargine, 10 Units, Subcutaneous, Nightly  insulin lispro, 2-9 Units, Subcutaneous, 4x Daily AC & at Bedtime  Insulin Lispro, 5 Units, Subcutaneous, TID With Meals  [Held by provider] isosorbide mononitrate, 30 mg, Oral, Daily  lactobacillus acidophilus, 1 capsule, Oral, Daily  levothyroxine, 88 mcg, Oral, Q AM  [Held by provider] losartan, 50 mg, Oral, Daily  pantoprazole, 40 mg, Oral, Q AM  polyethylene glycol, 17 g, Oral, Daily  sodium chloride, 10 mL, Intravenous, Q12H  tamsulosin, 0.4 mg, Oral, Daily      Continuous Infusions:   PRN Meds:.  acetaminophen    senna-docusate sodium **AND** polyethylene glycol **AND** bisacodyl **AND** bisacodyl    dextrose    dextrose    famotidine    glucagon (human  recombinant)    Magnesium Low Dose Replacement - Follow Nurse / BPA Driven Protocol    nitroglycerin    prochlorperazine    sodium chloride    sodium chloride    sodium chloride    Assessment & Plan   Assessment & Plan     Active Hospital Problems    Diagnosis  POA    **UTI (urinary tract infection) [N39.0]  Yes    PAF (paroxysmal atrial fibrillation) [I48.0]  Yes    Anemia, chronic disease [D63.8]  Yes    Stage 3b chronic kidney disease [N18.32]  Yes    Chronic HFrEF (heart failure with reduced ejection fraction) [I50.22]  Yes    Type 2 diabetes mellitus [E11.9]  Yes    Hypertension [I10]  Yes    Hyperlipidemia [E78.5]  Yes    Hypothyroidism (acquired) [E03.9]  Yes    RBBB [I45.10]  Yes    S/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 Au [Z95.1]  Not Applicable      Resolved Hospital Problems   No resolved problems to display.        Brief Hospital Course to date:  Jermaine Singh is a 79 y.o. male with a history of CAD, DM, HTN, HLD, PAF, PVD, RBBB, Asthma, weakness, Hypothyroidism, CKD, presents to the ED with complaints of generalized weakness.      This patient's problems and plans were partially entered by my partner and updated as appropriate by me 04/24/24.      Assessment and Plan:     Acute UTI (POA)  --UA: Blood moderate, leukocytes small, protein 30, RBC 11-20, WBC 21-50, bacteria 2+  -Urine Cx with no growth. Unfortunately micro tech discarded plate so not further worked up for yeast per discussion with ID  -Plan to re-check another UA and send for fungal Cx.   -Dysuria improved today  -Patient had UA at PCP 3/5/24 took ~ 4 days of Bactrim. Was prescribed course of Fluconazole as Cx + yeast which he completed. Also had UA recently at Dr Soto's office, no growth. Continues to have dysuria.  All prior Cx in our system + yeast but none were further speciated.   -DC Rocephin per discussion with ID     Urinary retention  BPH  Bladder wall thickening--cystoscopy on 3/22/2024 showed scattered areas of flat erythema most  notable on the right lateral wall.  3-4+ trabeculation.  -- Acute urinary retention protocol, bladder scan q shift   -- Flomax  -- Follows with Dr. Soto with urology, consulted as patient supposed to follow-up with him in office today      Generalized weakness  -- Fall precautions  -- PT/OT, PT recommends IPR. Pt/wife agreeable. CM following     Chronic cellulitis RLE  Blister right foot  --X-ray right foot shows large amount of soft tissue swelling with no definite subcutaneous emphysema or destructive bone lesion  -- WOC has seen  -- ID following, appears to be more of a dermatitis  -- PT wound following     T2DM  -- A1c 8.5  -- Continue MDSSI  -- DC scheduled Humalog  -- Continue Lantus, decrease to 8 units nightly      Elevated troponin--chronically elevated (chronically elevate trop >100)  CAD s/p CABG  Parox afib/SSS/pacemaker  HTN  HLD  Severe PVD, Hx multiple toe amputations R foot   RBBB  Chronic HFrEF (EF 42% previous stress test)  -- Strict I's and O's  -- Daily weights  -- Aspirin, atorvastatin,   -- Hold Coreg, Imdur, losartan, Bumex due to low BP  -- Consider midodrine if BP remains borderline     CKD 3 (baseline cr ~1.6-2.1)  Hyperkalemia -> resolved   -Cr at baseline  -Potassium normalized, pt refused Lokelma      Asthma  -currently stable, not in exacerbation  -Chest x-ray shows no active pulmonary process     Anemia  Iron def  -On IV iron (3/3)  -H&H stable    TCP  -new from prior admission  -slightly improved today, monitor     Hypothyroidism  --TSH 1.94; Continue levothyroxine    Expected Discharge Location and Transportation: Rehab  Expected Discharge   Expected Discharge Date: 4/26/2024; Expected Discharge Time:      DVT prophylaxis:  Medical DVT prophylaxis orders are present.         AM-PAC 6 Clicks Score (PT): 16 (04/23/24 111)    CODE STATUS:   Code Status and Medical Interventions:   Ordered at: 04/22/24 2024     Level Of Support Discussed With:    Patient     Code Status (Patient has no  pulse and is not breathing):    CPR (Attempt to Resuscitate)     Medical Interventions (Patient has pulse or is breathing):    Full Support       Luz Elena Batista, DO  04/24/24

## 2024-04-24 NOTE — THERAPY TREATMENT NOTE
Patient Name: Jermaine Singh  : 1945    MRN: [id number]                              Today's Date: 2024       Admit Date: 2024    Visit Dx:     ICD-10-CM ICD-9-CM   1. Acute UTI  N39.0 599.0   2. Generalized weakness  R53.1 780.79   3. Edema of right lower extremity  R60.0 782.3   4. Leg erythema  L53.9 695.9   5. Chronic kidney disease, unspecified CKD stage  N18.9 585.9   6. Elevated troponin  R79.89 790.6   7. 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 Au    Atherosclerosis of native artery of both lower extremities with intermittent claudication    SSS (sick sinus syndrome)    Chronic HFrEF (heart failure with reduced ejection fraction)    DVT, bilateral lower limbs    Cellulitis of right lower extremity    Anemia, chronic disease    PVD (peripheral vascular disease)    Status post amputation of great toe, left    Diabetic foot ulcer    GERD without esophagitis    Stage 3b chronic kidney disease    Arthralgia of shoulder    Right inguinal hernia    Diabetic foot infection    Status post amputation of great toe and second toe of left foot    PAF (paroxysmal atrial fibrillation)    Moderate malnutrition    UTI (urinary tract infection)     Past Medical History:   Diagnosis Date    Allergic     Arthritis     Asthma     Coronary artery disease     Diabetes mellitus 2000    started on inuslin 2018; started on po meds in ; checking blood sugars daily     Disease of thyroid gland     po meds daily for hypothyroidism     Elevated serum creatinine 11/15/2022    Elevated troponin 10/02/2022    Generalized weakness 11/15/2022    History of fracture as a child     rt leg- severe     Hyperlipidemia     Hypertension     Hypothyroidism     PAF (paroxysmal atrial fibrillation)  07/23/2023    Peripheral neuropathy     Peripheral vascular disease     s/p angiogram 2/19-needs stent in left leg     Pleural effusion, bilateral 11/15/2022    Proximal phalanx fracture of the second digit extending into the second metatarsal joint 07/28/2022    RBBB     Right knee pain     Unstable angina 02/12/2019    Added automatically from request for surgery [id number]    Vitamin D deficiency      Past Surgical History:   Procedure Laterality Date    AMPUTATION DIGIT Left 01/17/2023    Procedure: AMPUTATION DIGIT LEFT;  Surgeon: Gopal Márquez MD;  Location:  [facility];  Service: Vascular;  Laterality: Left;    APPENDECTOMY      CARDIAC CATHETERIZATION N/A 02/14/2019    Procedure: Left Heart Cath;  Surgeon: Cooper Apodaca MD;  Location:  [facility];  Service: Cardiology    CARDIAC ELECTROPHYSIOLOGY PROCEDURE N/A 05/18/2022    Procedure: DEVICE IMPLANT;  Surgeon: Cooper Apodaca MD;  Location:  [facility];  Service: Cardiology;  Laterality: N/A;    CARDIAC SURGERY      COLONOSCOPY      CORONARY ARTERY BYPASS GRAFT N/A 03/22/2019    Procedure: MEDIAN STERNOTOMY, CORONARY ARTERY BYPASS GRAFT X3, UTILIZING THE LEFT INTERNAL MAMMARY ARTERY, EVH AND OPEN HARVEST OF THE RIGHT GREATER SAPHENOUS VEIN, EXPLORATION OF THE LEFT LEG;  Surgeon: Ap Au MD;  Location:  [facility];  Service: Cardiothoracic    EYE SURGERY Bilateral     cataracts     FRACTURE SURGERY      INTERVENTIONAL RADIOLOGY PROCEDURE N/A 07/29/2021    Procedure: Abdominal Aortagram with Runoff;  Surgeon: Jermaine Hernandez MD;  Location:  [facility];  Service: Cardiovascular;  Laterality: N/A;    KNEE ARTHROSCOPY      right x 2, left x 1    LACERATION REPAIR      right leg    LEG SURGERY      2 for fracture of rt leg     TONSILLECTOMY      Adnoidectomy    TRANS METATARSAL AMPUTATION Left 08/02/2022    Procedure: GREAT TOE AMPUTATION LEFT;  Surgeon: Gopal Márquez MD;  Location:   [facility];  Service: Vascular;  Laterality: Left;      General Information       Row Name 04/24/24 1439          Physical Therapy Time and Intention    Document Type therapy note (daily note)  -     Mode of Treatment physical therapy  -       Row Name 04/24/24 1439          General Information    Patient Profile Reviewed yes  -     Existing Precautions/Restrictions fall;other (see comments)  RLE wounds  -     Barriers to Rehab medically complex;previous functional deficit  -       Row Name 04/24/24 1439          Cognition    Orientation Status (Cognition) oriented x 3  -       Row Name 04/24/24 1439          Safety Issues, Functional Mobility    Safety Issues Affecting Function (Mobility) awareness of need for assistance;insight into deficits/self-awareness;judgment;positioning of assistive device;friction/shear risk;problem-solving;safety precaution awareness;safety precautions follow-through/compliance;sequencing abilities  -     Impairments Affecting Function (Mobility) balance;endurance/activity tolerance;strength;postural/trunk control  -               User Key  (r) = Recorded By, (t) = Taken By, (c) = Cosigned By      Initials Name Provider Type     Jenna Stanley PT Physical Therapist                   Mobility       Row Name 04/24/24 1443          Bed Mobility    Comment, (Bed Mobility) up in chair  -       Row Name 04/24/24 1443          Sit-Stand Transfer    Sit-Stand [place] (Transfers) verbal cues;minimum assist (75% patient effort)  -     Assistive Device (Sit-Stand Transfers) walker, front-wheeled  -     Comment, (Sit-Stand Transfer) VC for hand placement, appropriate alignment, lowering with eccentric control  -       Row Name 04/24/24 1443          Gait/Stairs (Locomotion)    [place] Level (Gait) minimum assist (75% patient effort);1 person assist;1 person to manage equipment  -     Assistive Device (Gait) walker, front-wheeled  -     Patient was able to Ambulate  yes  -     Distance in Feet (Gait) 18  -     Deviations/Abnormal Patterns (Gait) ashli decreased;gait speed decreased;stride length decreased;weight shifting decreased;bilateral deviations  -     Bilateral Gait Deviations forward flexed posture;heel strike decreased  -     Comment, (Gait/Stairs) Pt. ambulated with a step through gait pattern at a decreased speed. VC for upright posture, safety recommendations. Activity limited by fatigue.  -               User Key  (r) = Recorded By, (t) = Taken By, (c) = Cosigned By      Initials Name Provider Type     Jenna Stanley, SETH Physical Therapist                   Obj/Interventions       Row Name 04/24/24 1445          Motor Skills    Motor Skills functional endurance  -     Functional Endurance fatigues easily; modified RPE: 5/10  -     Therapeutic Exercise hip;ankle;knee  -       Row Name 04/24/24 1445          Hip (Therapeutic Exercise)    Hip (Therapeutic Exercise) isometric exercises;strengthening exercise  -     Hip Isometrics (Therapeutic Exercise) bilateral;gluteal sets;10 repetitions  -     Hip Strengthening (Therapeutic Exercise) bilateral;aBduction;aDduction;heel slides;marching while seated;10 repetitions  -       Row Name 04/24/24 1445          Knee (Therapeutic Exercise)    Knee (Therapeutic Exercise) isometric exercises;strengthening exercise  -     Knee Isometrics (Therapeutic Exercise) bilateral;quad sets;10 repetitions  -     Knee Strengthening (Therapeutic Exercise) bilateral;SLR (straight leg raise);LAQ (long arc quad);10 repetitions  -       Row Name 04/24/24 1445          Ankle (Therapeutic Exercise)    Ankle (Therapeutic Exercise) AROM (active range of motion)  -     Ankle AROM (Therapeutic Exercise) bilateral;dorsiflexion;plantarflexion;10 repetitions  -       Row Name 04/24/24 144          Balance    Balance Assessment sitting static balance;sitting dynamic balance;sit to stand dynamic balance;standing static  balance;standing dynamic balance  -     Static Sitting Balance standby assist  -     Dynamic Sitting Balance contact guard  -     Position, Sitting Balance unsupported;sitting in chair  -     Sit to Stand Dynamic Balance minimal assist  -     Static Standing Balance contact guard  -SS     Dynamic Standing Balance minimal assist  -SS     Position/Device Used, Standing Balance supported;walker, front-wheeled  -     Balance Interventions standing;sitting;sit to stand;supported;static;dynamic  -               User Key  (r) = Recorded By, (t) = Taken By, (c) = Cosigned By      Initials Name Provider Type     Jenna Stanley, PT Physical Therapist                   Goals/Plan    No documentation.                  Clinical Impression       Row Name 04/24/24 1450          Pain    Pretreatment Pain Rating 0/10 - no pain  -     Posttreatment Pain Rating 0/10 - no pain  -     Pain Intervention(s) Repositioned;Ambulation/increased activity;Elevated  -     Additional Documentation Pain Scale: Numbers Pre/Post-Treatment (Group)  -       Row Name 04/24/24 1450          Plan of Care Review    Plan of Care Reviewed With patient  -     Progress improving  -     Outcome Evaluation Pt. continues to present below baseline function w/generalized weakness, balance deficits and decreased functional endurance affecting his ability to safely participate in functional mobility. He performed transfers and ambulated 18' w/front wheeled walker, min assist. Activity limited by fatigue. Pt. tolerated ther-ex well. Continue IPPT POC to progress as tolerated.  -       Row Name 04/24/24 1450          Therapy Assessment/Plan (PT)    Rehab Potential (PT) good, to achieve stated therapy goals  -     Criteria for Skilled Interventions Met (PT) yes;meets criteria;skilled treatment is necessary  -     Therapy Frequency (PT) daily  -       Row Name 04/24/24 1450          Vital Signs    Pre Systolic BP Rehab 110  -      Pre Treatment Diastolic BP 75  -SS     Pretreatment Heart Rate (beats/min) 69  -SS     Pre Patient Position Sitting  -SS       Row Name 04/24/24 1450          Positioning and Restraints    Pre-Treatment Position sitting in chair/recliner  -SS     Post Treatment Position chair  -SS     In Chair notified nsg;reclined;call light within reach;encouraged to call for assist;exit alarm on;waffle cushion;legs elevated;heels elevated  -               User Key  (r) = Recorded By, (t) = Taken By, (c) = Cosigned By      Initials Name Provider Type    Jenna Ramirze PT Physical Therapist                   Outcome Measures       Row Name 04/24/24 1452          How much help from another person do you currently need...    Turning from your back to your side while in flat bed without using bedrails? 3  -SS     Moving from lying on back to sitting on the side of a flat bed without bedrails? 3  -SS     Moving to and from a bed to a chair (including a wheelchair)? 3  -SS     Standing up from a chair using your arms (e.g., wheelchair, bedside chair)? 3  -SS     Climbing 3-5 steps with a railing? 2  -SS     To walk in hospital room? 3  -SS     AM-PAC 6 Clicks Score (PT) 17  -SS     Highest Level of Mobility Goal 5 --> Static standing  -       Row Name 04/24/24 1452          Functional Assessment    Outcome Measure Options AM-PAC 6 Clicks Basic Mobility (PT)  -               User Key  (r) = Recorded By, (t) = Taken By, (c) = Cosigned By      Initials Name Provider Type    Jenna Ramirez PT Physical Therapist                                 Physical Therapy Education       Title: PT OT SLP Therapies (Done)       Topic: Physical Therapy (Done)       Point: Mobility training (Done)       Learning Progress Summary             Patient Eager, KARINA, ROBERTO,DU,NR by LENY at 4/24/2024 1452    Comment: Reviewed safety/technique w/transfers, ambulation, HEP, PT POC    Acceptance, E, VU by ELENITA at 4/23/2024 1120   Family Acceptance, E, VU by ELENITA  at 4/23/2024 1120                         Point: Home exercise program (Done)       Learning Progress Summary             Patient Eager, E, VU,DU,NR by  at 4/24/2024 1452    Comment: Reviewed safety/technique w/transfers, ambulation, HEP, PT POC                         Point: Body mechanics (Done)       Learning Progress Summary             Patient Eager, E, VU,DU,NR by SS at 4/24/2024 1452    Comment: Reviewed safety/technique w/transfers, ambulation, HEP, PT POC    Acceptance, E, VU by KR at 4/23/2024 1120   Family Acceptance, E, VU by KR at 4/23/2024 1120                         Point: Precautions (Done)       Learning Progress Summary             Patient Eager, E, VU,DU,NR by  at 4/24/2024 1452    Comment: Reviewed safety/technique w/transfers, ambulation, HEP, PT POC    Acceptance, E, VU by KR at 4/23/2024 1120   Family Acceptance, E, VU by KR at 4/23/2024 1120                                         User Key       Initials Effective Dates Name Provider Type Discipline     06/01/21 -  Jenna Stanley, PT Physical Therapist PT     12/30/22 -  Tiffanie Erwin, PT Physical Therapist PT                  PT Recommendation and Plan     Plan of Care Reviewed With: patient  Progress: improving  Outcome Evaluation: Pt. continues to present below baseline function w/generalized weakness, balance deficits and decreased functional endurance affecting his ability to safely participate in functional mobility. He performed transfers and ambulated 18' w/front wheeled walker, min assist. Activity limited by fatigue. Pt. tolerated ther-ex well. Continue IPPT POC to progress as tolerated.     Time Calculation:         PT Charges       Row Name 04/24/24 1453 04/24/24 1100          Time Calculation    Start Time 1358  - 1100  -MF     PT Received On 04/24/24  - --     PT Goal Re-Cert Due Date -- 05/03/24  -        Timed Charges    59829 - PT Therapeutic Exercise Minutes 10  -SS --     64668 - Gait Training Minutes  8   -SS --     71108 - PT Therapeutic Activity Minutes 5  -SS --        Untimed Charges    PT Eval/Re-eval Minutes -- 25  -MF     Wound Care -- 04367 Unna boot;48440 Selective debridement  -MF     25855-Wjkw Boot -- 15  -MF     67626-Zddkocjte debridement -- 25  -MF        Total Minutes    Timed Charges Total Minutes 23  -SS --     Untimed Charges Total Minutes -- 65  -MF      Total Minutes 23  -SS 65  -MF               User Key  (r) = Recorded By, (t) = Taken By, (c) = Cosigned By      Initials Name Provider Type     Cooper Ni, PT Physical Therapist    SS Jenna Stanley, SETH Physical Therapist                  Therapy Charges for Today       Code Description Service Date Service Provider Modifiers Qty    58224785636 HC PT THER PROC EA 15 MIN 4/24/2024 Jenna Stanley, PT GP 1    09288183121 HC GAIT TRAINING EA 15 MIN 4/24/2024 Jenna Stanley, PT GP 1    66017687373 HC PT THER SUPP EA 15 MIN 4/24/2024 Jenna Stanley, PT GP 2            PT G-Codes  Outcome Measure Options: AM-PAC 6 Clicks Basic Mobility (PT)  AM-PAC 6 Clicks Score (PT): 17  AM-PAC 6 Clicks Score (OT): 16  Modified [place] Scale: 4 - Moderately severe disability.  Unable to walk without assistance, and unable to attend to own bodily needs without assistance.  PT Discharge Summary  Anticipated Discharge Disposition (PT): inpatient rehabilitation facility    Jnena Stanley PT  4/24/2024

## 2024-04-24 NOTE — PLAN OF CARE
Goal Outcome Evaluation:  Plan of Care Reviewed With: patient        Progress: improving  Outcome Evaluation: Pt. continues to present below baseline function w/generalized weakness, balance deficits and decreased functional endurance affecting his ability to safely participate in functional mobility. He performed transfers and ambulated 18' w/front wheeled walker, min assist. Activity limited by fatigue. Pt. tolerated ther-ex well. Continue IPPT POC to progress as tolerated.      Anticipated Discharge Disposition (PT): inpatient rehabilitation facility

## 2024-04-24 NOTE — THERAPY RE-EVALUATION
Acute Care - Wound/Debridement Initial Evaluation  Psychiatric     Patient Name: Jermaine Singh  : 1945  MRN: [id number]  Today's Date: 2024                Admit Date: 2024    Visit Dx:    ICD-10-CM ICD-9-CM   1. Acute UTI  N39.0 599.0   2. Generalized weakness  R53.1 780.79   3. Edema of right lower extremity  R60.0 782.3   4. Leg erythema  L53.9 695.9   5. Chronic kidney disease, unspecified CKD stage  N18.9 585.9   6. Elevated troponin  R79.89 790.6   7. 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 Au    Atherosclerosis of native artery of both lower extremities with intermittent claudication    SSS (sick sinus syndrome)    Chronic HFrEF (heart failure with reduced ejection fraction)    DVT, bilateral lower limbs    Cellulitis of right lower extremity    Anemia, chronic disease    PVD (peripheral vascular disease)    Status post amputation of great toe, left    Diabetic foot ulcer    GERD without esophagitis    Stage 3b chronic kidney disease    Arthralgia of shoulder    Right inguinal hernia    Diabetic foot infection    Status post amputation of great toe and second toe of left foot    PAF (paroxysmal atrial fibrillation)    Moderate malnutrition    UTI (urinary tract infection)        Past Medical History:   Diagnosis Date    Allergic     Arthritis     Asthma     Coronary artery disease     Diabetes mellitus 2000    started on inuslin 2018; started on po meds in ; checking blood sugars daily     Disease of thyroid gland     po meds daily for hypothyroidism     Elevated serum creatinine 11/15/2022    Elevated troponin 10/02/2022    Generalized weakness 11/15/2022    History of fracture as a child     rt leg- severe     Hyperlipidemia     Hypertension      Hypothyroidism     PAF (paroxysmal atrial fibrillation) 07/23/2023    Peripheral neuropathy     Peripheral vascular disease     s/p angiogram 2/19-needs stent in left leg     Pleural effusion, bilateral 11/15/2022    Proximal phalanx fracture of the second digit extending into the second metatarsal joint 07/28/2022    RBBB     Right knee pain     Unstable angina 02/12/2019    Added automatically from request for surgery [id number]    Vitamin D deficiency         Past Surgical History:   Procedure Laterality Date    AMPUTATION DIGIT Left 01/17/2023    Procedure: AMPUTATION DIGIT LEFT;  Surgeon: Gopal Márquez MD;  Location:  [facility];  Service: Vascular;  Laterality: Left;    APPENDECTOMY      CARDIAC CATHETERIZATION N/A 02/14/2019    Procedure: Left Heart Cath;  Surgeon: Cooper Apodaca MD;  Location: [facility] INVASIVE LOCATION;  Service: Cardiology    CARDIAC ELECTROPHYSIOLOGY PROCEDURE N/A 05/18/2022    Procedure: DEVICE IMPLANT;  Surgeon: Cooper Apodaca MD;  Location:  [facility] INVASIVE LOCATION;  Service: Cardiology;  Laterality: N/A;    CARDIAC SURGERY      COLONOSCOPY      CORONARY ARTERY BYPASS GRAFT N/A 03/22/2019    Procedure: MEDIAN STERNOTOMY, CORONARY ARTERY BYPASS GRAFT X3, UTILIZING THE LEFT INTERNAL MAMMARY ARTERY, EVH AND OPEN HARVEST OF THE RIGHT GREATER SAPHENOUS VEIN, EXPLORATION OF THE LEFT LEG;  Surgeon: Ap Au MD;  Location:  [facility];  Service: Cardiothoracic    EYE SURGERY Bilateral     cataracts     FRACTURE SURGERY      INTERVENTIONAL RADIOLOGY PROCEDURE N/A 07/29/2021    Procedure: Abdominal Aortagram with Runoff;  Surgeon: Jermaine Hernandez MD;  Location:  [facility] CATH INVASIVE LOCATION;  Service: Cardiovascular;  Laterality: N/A;    KNEE ARTHROSCOPY      right x 2, left x 1    LACERATION REPAIR      right leg    LEG SURGERY      2 for fracture of rt leg     TONSILLECTOMY      Adnoidectomy    TRANS METATARSAL AMPUTATION Left 08/02/2022    Procedure: GREAT TOE  AMPUTATION LEFT;  Surgeon: Gopal Márquez MD;  Location: [facility];  Service: Vascular;  Laterality: Left;           Wound 04/22/24 2240 Left anterior third toe (Active)   Closure Adhesive bandage 04/24/24 0359   Edges irregular 04/24/24 0359   Dressing Care foam;border dressing 04/23/24 2009   Periwound Care dry periwound area maintained 04/23/24 2009       Wound 04/22/24 2240 Right posterior plantar Blisters (Active)   Wound Image   04/24/24 1100   Dressing Appearance open to air 04/24/24 1100   Base epithelialization 04/24/24 1100   Periwound intact 04/24/24 1100   Periwound Temperature warm 04/24/24 1100   Periwound Skin Turgor soft 04/24/24 1100   Edges irregular 04/24/24 1100   Wound Length (cm) 2.5 cm 04/24/24 1100   Wound Width (cm) 3 cm 04/24/24 1100   Wound Depth (cm) 0 cm 04/24/24 1100   Wound Surface Area (cm^2) 7.5 cm^2 04/24/24 1100   Wound Volume (cm^3) 0 cm^3 04/24/24 1100   Drainage Amount none 04/24/24 1100   Dressing Care dressing changed 04/24/24 1100   Periwound Care cleansed with pH balanced cleanser 04/24/24 1100       Wound 04/22/24 2240 Right lower leg Other (comment) (Active)   Wound Image   04/24/24 1100   Dressing Appearance intact;moist drainage 04/24/24 1100   Base moist;pink;red 04/24/24 1100   Periwound intact;dry 04/24/24 1100   Periwound Temperature warm 04/24/24 1100   Periwound Skin Turgor soft 04/24/24 1100   Edges irregular 04/24/24 1100   Wound Length (cm) 9 cm 04/24/24 1100   Wound Width (cm) 4 cm 04/24/24 1100   Wound Depth (cm) 0.1 cm 04/24/24 1100   Wound Surface Area (cm^2) 36 cm^2 04/24/24 1100   Wound Volume (cm^3) 3.6 cm^3 04/24/24 1100   Drainage Characteristics/Odor serosanguineous 04/24/24 1100   Drainage Amount small 04/24/24 1100   Care, Wound cleansed with;soap and water;debrided 04/24/24 1100   Dressing Care dressing changed 04/24/24 1100   Periwound Care dry periwound area maintained 04/24/24 1100      Lymphedema       Row Name 04/24/24 1100              Lymphedema Edema Assessment    Ptting Edema Category By severity  -MF      Pitting Edema Moderate  -MF         Skin Changes/Observations    Location/Assessment Lower Extremity  -MF      Lower Extremity Conditions right:;inflamed  -MF      Lower Extremity Color/Pigment right:;erythema  -MF         Lymphedema Pulses/Capillary Refill    Lymphedema Pulses/Capillary Refill lower extremity pulses;capillary refill  -MF      Dorsalis Pedis Pulse right:;left:;+2 normal  -MF      Capillary Refill lower extremity capillary refill  -MF      Lower Extremity Capillary Refill right:;left:;less than 3 seconds  -MF         Lymphedema Measurements    Measurement Type(s) Quick Girth  -MF      Quick Girth Areas Lower extremities  -MF         LLE Quick Girth (cm)    Mid foot 24 cm  -MF      Smallest ankle 24 cm  -MF      Largest calf 36.5 cm  -MF         RLE Quick Girth (cm)    Mid foot 24 cm  -MF      Smallest ankle 24 cm  -MF      Largest calf 38 cm  -MF      RLE Quick Girth Total 86  -MF         Compression/Skin Care    Compression/Skin Care skin care;wrapping location;bandaging  -MF      Skin Care washed/dried;lotion applied  -MF      Wrapping Location lower extremity  -MF      Wrapping Location LE bilateral:;foot to knee  -MF      Wrapping Comments RLE unna boot with coban and spandage  -MF                User Key  (r) = Recorded By, (t) = Taken By, (c) = Cosigned By      Initials Name Provider Type    Cooper Isabel, PT Physical Therapist                    WOUND DEBRIDEMENT  Total area of Debridement: ~10cm2  Debridement Site 1  Location- Site 1: RLE wound  Selective Debridement- Site 1: Wound Surface <20cmsq  Instruments- Site 1: tweezers  Excised Tissue Description- Site 1: minimum, slough, other (comment) (crusted nonviable skin)  Bleeding- Site 1: none               PT Assessment (Last 12 Hours)       PT Evaluation and Treatment       Row Name 04/24/24 1100          Physical Therapy Time and Intention    Subjective  Information complains of;weakness;fatigue  -     Document Type evaluation;therapy note (daily note);wound care  -MF     Mode of Treatment individual therapy;physical therapy  -       Row Name 04/24/24 1100          General Information    Patient Profile Reviewed yes  -MF     Patient Observations alert;cooperative;agree to therapy  -     Pertinent History of Current Functional Problem R LE wound with LE edema  -     Risks Reviewed patient:;increased discomfort  -     Benefits Reviewed patient:;improve skin integrity  -     Barriers to Rehab medically complex;previous functional deficit;physical barrier  -       Row Name 04/24/24 1100          Pain    Pretreatment Pain Rating 0/10 - no pain  -     Posttreatment Pain Rating 0/10 - no pain  -MF       Row Name             Wound 04/22/24 2240 Left anterior third toe    Wound - Properties Group Placement Date: 04/22/24  -BS Placement Time: 2240  -BS Side: Left  -BS Orientation: anterior  -BS Location: third toe  -BS    Retired Wound - Properties Group Placement Date: 04/22/24  -BS Placement Time: 2240  -BS Side: Left  -BS Orientation: anterior  -BS Location: third toe  -BS    Retired Wound - Properties Group Date first assessed: 04/22/24  -BS Time first assessed: 2240  -BS Side: Left  -BS Location: third toe  -BS      Row Name 04/24/24 1100          Wound 04/22/24 2240 Right posterior plantar Blisters    Wound - Properties Group Placement Date: 04/22/24  -BS Placement Time: 2240  -BS Side: Right  -BS Orientation: posterior  -BS Location: plantar  -BS Primary Wound Type: Blisters  -BS    Wound Image Images linked: 1  -MF     Dressing Appearance open to air  -MF     Base epithelialization  -MF     Periwound intact  -MF     Periwound Temperature warm  -MF     Periwound Skin Turgor soft  -MF     Edges irregular  -MF     Wound Length (cm) 2.5 cm  -MF     Wound Width (cm) 3 cm  -MF     Wound Depth (cm) 0 cm  intact blister  -MF     Wound Surface Area (cm^2) 7.5  cm^2  -MF     Wound Volume (cm^3) 0 cm^3  -MF     Drainage Amount none  -MF     Dressing Care dressing changed  -MF     Periwound Care cleansed with pH balanced cleanser  -MF     Retired Wound - Properties Group Placement Date: 04/22/24  -BS Placement Time: 2240 -BS Side: Right  -BS Orientation: posterior  -BS Location: plantar  -BS Primary Wound Type: Blisters  -BS    Retired Wound - Properties Group Date first assessed: 04/22/24 -BS Time first assessed: 2240 -BS Side: Right  -BS Location: plantar  -BS Primary Wound Type: Blisters  -BS      Row Name 04/24/24 1100          Wound 04/22/24 2240 Right lower leg Other (comment)    Wound - Properties Group Placement Date: 04/22/24  -BS Placement Time: 2240 -BS Side: Right  -BS Orientation: lower  -BS Location: leg  -BS Primary Wound Type: Other  -BS    Wound Image Images linked: 1  -MF     Dressing Appearance intact;moist drainage  -MF     Base moist;pink;red  -MF     Periwound intact;dry  -MF     Periwound Temperature warm  -MF     Periwound Skin Turgor soft  -MF     Edges irregular  -MF     Wound Length (cm) 9 cm  -MF     Wound Width (cm) 4 cm  -MF     Wound Depth (cm) 0.1 cm  -MF     Wound Surface Area (cm^2) 36 cm^2  -MF     Wound Volume (cm^3) 3.6 cm^3  -MF     Drainage Characteristics/Odor serosanguineous  -MF     Drainage Amount small  -MF     Care, Wound cleansed with;soap and water;debrided  -MF     Dressing Care dressing changed  -MF     Periwound Care dry periwound area maintained  -MF     Retired Wound - Properties Group Placement Date: 04/22/24  -BS Placement Time: 2240 -BS Side: Right  -BS Orientation: lower  -BS Location: leg  -BS Primary Wound Type: Other  -BS    Retired Wound - Properties Group Date first assessed: 04/22/24 -BS Time first assessed: 2240 -BS Side: Right  -BS Location: leg  -BS Primary Wound Type: Other  -BS      Row Name 04/24/24 1100          Coping    Observed Emotional State flat  -MF     Verbalized Emotional State frustration   -     Trust Relationship/Rapport care explained  -       Row Name 04/24/24 1100          Plan of Care Review    Plan of Care Reviewed With patient  -     Outcome Evaluation Pt presents with chronic RLE wound with history of cellulitis and limited healing. PT was able to lightly debride several areas of the RLE to help decrease bioburden and improve skin integrity and healing potential. PT applied light compression with unna boot to help further reduce LE edema to support wound healing. PT will f/u in 2-3 days with further debridement PRN and unna boot change.  -       Row Name 04/24/24 1100          Positioning and Restraints    Pre-Treatment Position sitting in chair/recliner  -     Post Treatment Position chair  -     In Chair reclined;call light within reach  -       Row Name 04/24/24 1100          Therapy Assessment/Plan (PT)    Patient/Family Therapy Goals Statement (PT) wound healing.  -     PT Diagnosis (PT) R LE wound  -     Rehab Potential (PT) fair, will monitor progress closely  -     Criteria for Skilled Interventions Met (PT) yes;meets criteria;skilled treatment is necessary  -       Row Name 04/24/24 1100          Therapy Plan Review/Discharge Plan (PT)    Therapy Plan Review (PT) evaluation/treatment results reviewed;care plan/treatment goals reviewed;risks/benefits reviewed;current/potential barriers reviewed;participants voiced agreement with care plan;participants included;patient  -       Row Name 04/24/24 1100          Physical Therapy Goals    Wound Care Goal Selection (PT) wound care, PT goal 1  -       Row Name 04/24/24 1100          Wound Care Goal 1 (PT)    Wound Care Goal 1 (PT) Decrease RLE wound size by 50% as evidence of wound healing.  -     Time Frame (Wound Care Goal 1, PT) 10 days  -               User Key  (r) = Recorded By, (t) = Taken By, (c) = Cosigned By      Initials Name Provider Type    Cooper Isabel, PT Physical Therapist    BHARAT Magdaleno,  Vivian CONNER RN Registered Nurse                  Physical Therapy Education       Title: PT OT SLP Therapies (In Progress)       Topic: Physical Therapy (In Progress)       Point: Mobility training (Done)       Learning Progress Summary             Patient Acceptance, E, VU by KR at 4/23/2024 1120   Family Acceptance, E, VU by KR at 4/23/2024 1120                         Point: Home exercise program (Not Started)       Learner Progress:  Not documented in this visit.              Point: Body mechanics (Done)       Learning Progress Summary             Patient Acceptance, E, VU by KR at 4/23/2024 1120   Family Acceptance, E, VU by KR at 4/23/2024 1120                         Point: Precautions (Done)       Learning Progress Summary             Patient Acceptance, E, VU by KR at 4/23/2024 1120   Family Acceptance, E, VU by KR at 4/23/2024 1120                                         User Key       Initials Effective Dates Name Provider Type Discipline    ELENITA 12/30/22 -  Tiffanie Erwin, PT Physical Therapist PT                    Recommendation and Plan  Anticipated Discharge Disposition (PT): inpatient rehabilitation facility  Planned Therapy Interventions (PT): wound care, patient/family education  Therapy Frequency (PT): daily  Plan of Care Reviewed With: patient           Outcome Evaluation: Pt presents with chronic RLE wound with history of cellulitis and limited healing. PT was able to lightly debride several areas of the RLE to help decrease bioburden and improve skin integrity and healing potential. PT applied light compression with unna boot to help further reduce LE edema to support wound healing. PT will f/u in 2-3 days with further debridement PRN and unna boot change.  Plan of Care Reviewed With: patient            Time Calculation   PT Charges       Row Name 04/24/24 1100             Time Calculation    Start Time 1100  -MF      PT Goal Re-Cert Due Date 05/03/24  -MF         Untimed Charges    PT  Eval/Re-eval Minutes 25  -MF      Wound Care 94646 Unna boot;76335 Selective debridement  -MF      29580-Unna Boot 15  -MF      47460-Mkemvezcu debridement 25  -MF         Total Minutes    Untimed Charges Total Minutes 65  -MF       Total Minutes 65  -MF                User Key  (r) = Recorded By, (t) = Taken By, (c) = Cosigned By      Initials Name Provider Type    Cooper Isabel, PT Physical Therapist                            PT G-Codes  Outcome Measure Options: AM-PAC 6 Clicks Daily Activity (OT)  AM-PAC 6 Clicks Score (PT): 16  AM-PAC 6 Clicks Score (OT): 16  Modified [place] Scale: 4 - Moderately severe disability.  Unable to walk without assistance, and unable to attend to own bodily needs without assistance.       Cooper Ni, PT  4/24/2024

## 2024-04-24 NOTE — PROGRESS NOTES
INFECTIOUS DISEASE Progress Note    Jermaine Singh  1945  [id number]    Admission Date: 4/22/2024      Requesting Provider: No ref. provider found  Evaluating Physician: Lincoln Padilla MD    Reason for Consultation:  UTI    History of present illness:    4/23/24: Patient is a 79 y.o. male  with a history of type 2 diabetes mellitus, peripheral neuropathy, stage III chronic kidney disease, bilateral DVTs, paroxysmal atrial fibrillation, sick sinus syndrome status post pacemaker placement, CHF, peripheral vascular disease, and left diabetic foot infection who is seen today for evaluation of UTI.  He had a history of UTI last month with urine cultures and early March growing yeast.  He was apparently treated with an antifungal agent.    Over the last few days he noted the onset of profound weakness and fatigue.  He also noticed some dysuria.  He was brought to the emergency room and found to have pyuria and bacteriuria with leukocytosis.   Urine culture was sent and he was started on ceftriaxone.   His white blood cell count today is 11.8.   He was noted to have a blister over his right fifth metatarsal head and a chronic right pretibial wound.    4/24/24: He remains afebrile.  His urine culture was finalized as negative.  I have asked the laboratory to hold the urine culture for yeast but they did not and it was disposed of last night.   He states that his dysuria is improved today.  Feels better.    Past Medical History:   Diagnosis Date    Allergic     Arthritis     Asthma     Coronary artery disease     Diabetes mellitus 2000    started on inuslin 12/2018; started on po meds in 2000; checking blood sugars daily     Disease of thyroid gland     po meds daily for hypothyroidism     Elevated serum creatinine 11/15/2022    Elevated troponin 10/02/2022    Generalized weakness 11/15/2022    History of fracture as a child     rt leg- severe     Hyperlipidemia     Hypertension     Hypothyroidism     PAF  (paroxysmal atrial fibrillation) 07/23/2023    Peripheral neuropathy     Peripheral vascular disease     s/p angiogram 2/19-needs stent in left leg     Pleural effusion, bilateral 11/15/2022    Proximal phalanx fracture of the second digit extending into the second metatarsal joint 07/28/2022    RBBB     Right knee pain     Unstable angina 02/12/2019    Added automatically from request for surgery [id number]    Vitamin D deficiency        Past Surgical History:   Procedure Laterality Date    AMPUTATION DIGIT Left 01/17/2023    Procedure: AMPUTATION DIGIT LEFT;  Surgeon: Gopal Márquez MD;  Location:  [facility];  Service: Vascular;  Laterality: Left;    APPENDECTOMY      CARDIAC CATHETERIZATION N/A 02/14/2019    Procedure: Left Heart Cath;  Surgeon: Cooper Apodaca MD;  Location:  [facility];  Service: Cardiology    CARDIAC ELECTROPHYSIOLOGY PROCEDURE N/A 05/18/2022    Procedure: DEVICE IMPLANT;  Surgeon: Cooper Apodaca MD;  Location:  [facility];  Service: Cardiology;  Laterality: N/A;    CARDIAC SURGERY      COLONOSCOPY      CORONARY ARTERY BYPASS GRAFT N/A 03/22/2019    Procedure: MEDIAN STERNOTOMY, CORONARY ARTERY BYPASS GRAFT X3, UTILIZING THE LEFT INTERNAL MAMMARY ARTERY, EVH AND OPEN HARVEST OF THE RIGHT GREATER SAPHENOUS VEIN, EXPLORATION OF THE LEFT LEG;  Surgeon: Ap Au MD;  Location:  [facility];  Service: Cardiothoracic    EYE SURGERY Bilateral     cataracts     FRACTURE SURGERY      INTERVENTIONAL RADIOLOGY PROCEDURE N/A 07/29/2021    Procedure: Abdominal Aortagram with Runoff;  Surgeon: Jermaine Hernandez MD;  Location:  [facility];  Service: Cardiovascular;  Laterality: N/A;    KNEE ARTHROSCOPY      right x 2, left x 1    LACERATION REPAIR      right leg    LEG SURGERY      2 for fracture of rt leg     TONSILLECTOMY      Adnoidectomy    TRANS METATARSAL AMPUTATION Left 08/02/2022    Procedure: GREAT TOE AMPUTATION LEFT;   Surgeon: Gopal Márquez MD;  Location: [facility];  Service: Vascular;  Laterality: Left;       Family History   Problem Relation Age of Onset    Coronary artery disease Mother     Diabetes Mother     Hyperlipidemia Mother     Cancer Father        Social History     Socioeconomic History    Marital status:     Number of children: 2   Tobacco Use    Smoking status: Never     Passive exposure: Past    Smokeless tobacco: Never   Vaping Use    Vaping status: Never Used   Substance and Sexual Activity    Alcohol use: Not Currently     Comment: every 2-3 months    Drug use: No    Sexual activity: Not Currently     Partners: Female       Allergies   Allergen Reactions    Vancomycin Other (See Comments)     Kidney failure per last admission         Medication:    Current Facility-Administered Medications:     acetaminophen (TYLENOL) tablet 650 mg, 650 mg, Oral, Q6H PRN, Maricel Peraza, APRN    apixaban (ELIQUIS) tablet 5 mg, 5 mg, Oral, Q12H, Maricel Peraza W, APRN, 5 mg at 04/23/24 2044    aspirin chewable tablet 81 mg, 81 mg, Oral, Daily, Maricel Peraza, APRN, 81 mg at 04/23/24 0902    atorvastatin (LIPITOR) tablet 20 mg, 20 mg, Oral, Nightly, Maricel Peraza W, APRN, 20 mg at 04/23/24 2044    azelastine (ASTELIN) nasal spray 2 spray, 2 spray, Each Nare, BID, Maricel Peraza, APRN    sennosides-docusate (PERICOLACE) 8.6-50 MG per tablet 2 tablet, 2 tablet, Oral, BID PRN **AND** polyethylene glycol (MIRALAX) packet 17 g, 17 g, Oral, Daily PRN, 17 g at 04/22/24 2225 **AND** bisacodyl (DULCOLAX) EC tablet 5 mg, 5 mg, Oral, Daily PRN, 5 mg at 04/22/24 2225 **AND** bisacodyl (DULCOLAX) suppository 10 mg, 10 mg, Rectal, Daily PRN, Maricel Peraza W, APRN    [Held by provider] bumetanide (BUMEX) tablet 1 mg, 1 mg, Oral, BID, Maricel Peraza, APRN, 1 mg at 04/22/24 2224    [Held by provider] carvedilol (COREG) tablet 3.125 mg, 3.125 mg, Oral, BID With Meals, Maricel Peraza, APRN, 3.125  mg at 04/22/24 2214    cefTRIAXone (ROCEPHIN) 1,000 mg in sodium chloride 0.9 % 100 mL MBP, 1,000 mg, Intravenous, Q24H, Maricel Peraza APRN, Last Rate: 200 mL/hr at 04/23/24 1216, 1,000 mg at 04/23/24 1216    dextrose (D50W) (25 g/50 mL) IV injection 25 g, 25 g, Intravenous, Q15 Min PRN, Maricel Peraza APRN    dextrose (GLUTOSE) oral gel 15 g, 15 g, Oral, Q15 Min PRN, Maricel Peraza APRN    famotidine (PEPCID) tablet 20 mg, 20 mg, Oral, BID PRN, Maricel Peraza APRN, 20 mg at 04/22/24 2214    ferric gluconate (FERRLECIT)125 MG in sodium chloride 0.9 % 100 mL IVPB, 125 mg, Intravenous, Daily, Artur Avila MD, Stopped at 04/23/24 1120    glucagon (GLUCAGEN) injection 1 mg, 1 mg, Intramuscular, Q15 Min PRN, Maricel Peraza APRN    insulin glargine (LANTUS, SEMGLEE) injection 10 Units, 10 Units, Subcutaneous, Nightly, Luz Elena Batista DO, 10 Units at 04/23/24 2044    Insulin Lispro (humaLOG) injection 2-9 Units, 2-9 Units, Subcutaneous, 4x Daily AC & at Bedtime, Maricel Peraza APRN, 4 Units at 04/23/24 2044    Insulin Lispro (humaLOG) injection 5 Units, 5 Units, Subcutaneous, TID With Meals, Luz Elena Batista DO, 5 Units at 04/23/24 1740    [Held by provider] isosorbide mononitrate (IMDUR) 24 hr tablet 30 mg, 30 mg, Oral, Daily, Maricel Peraza APRN    lactobacillus acidophilus (RISAQUAD) capsule 1 capsule, 1 capsule, Oral, Daily, Maricel Peraza APRN, 1 capsule at 04/23/24 0901    levothyroxine (SYNTHROID, LEVOTHROID) tablet 88 mcg, 88 mcg, Oral, Q AM, Maricel Peraza APRN, 88 mcg at 04/23/24 0932    [Held by provider] losartan (COZAAR) tablet 50 mg, 50 mg, Oral, Daily, Maricel Peraza APRN    Magnesium Low Dose Replacement - Follow Nurse / BPA Driven Protocol, , Does not apply, PRN, Maricel Peraza APRN    nitroglycerin (NITROSTAT) SL tablet 0.4 mg, 0.4 mg, Sublingual, Q5 Min PRN, Maricel Peraza APRN    pantoprazole (PROTONIX)  EC tablet 40 mg, 40 mg, Oral, Q AM, Artur Avila MD    polyethylene glycol (MIRALAX) packet 17 g, 17 g, Oral, Daily, Artur Avila MD, 17 g at 24 0913    prochlorperazine (COMPAZINE) injection 5 mg, 5 mg, Intravenous, Q6H PRN, Stu, Maricel W, APRN, 5 mg at 24 2225    sodium chloride 0.9 % flush 10 mL, 10 mL, Intravenous, PRN, Peraza, Maricel W, APRN    sodium chloride 0.9 % flush 10 mL, 10 mL, Intravenous, Q12H, Peraza, Maricel W, APRN, 10 mL at 24 2045    sodium chloride 0.9 % flush 10 mL, 10 mL, Intravenous, PRN, Stu, Maricel W, APRN    sodium chloride 0.9 % infusion 40 mL, 40 mL, Intravenous, PRN, Stu Maricel W, APRN    tamsulosin (FLOMAX) 24 hr capsule 0.4 mg, 0.4 mg, Oral, Daily, Stu, Maricel W, APRN, 0.4 mg at 24 0901    Antibiotics:  Anti-Infectives (From admission, onward)      Ordered     Dose/Rate Route Frequency Start Stop    24 1743  cefTRIAXone (ROCEPHIN) 1,000 mg in sodium chloride 0.9 % 100 mL MBP        Ordering Provider: Brad Perazaa W, APRN    1,000 mg  200 mL/hr over 30 Minutes Intravenous Every 24 Hours Scheduled 24 1845 24 1159              Review of Systems:  See HPI      Physical Exam:   Vital Signs  Temp (24hrs), Av.1 °F (36.7 °C), Min:97.9 °F (36.6 °C), Max:98.5 °F (36.9 °C)    Temp  Min: 97.9 °F (36.6 °C)  Max: 98.5 °F (36.9 °C)  BP  Min: 93/54  Max: 106/65  Pulse  Min: 63  Max: 92  Resp  Min: 16  Max: 16  SpO2  Min: 95 %  Max: 98 %    GENERAL:   Alert and debilitated appearing  HEENT: Normocephalic, atraumatic.  PERRL. EOMI. No conjunctival injection. No icterus. Oropharynx clear without evidence of thrush or exudate. No evidence of periodontal disease.    NECK: Supple   HEART: No murmur, rubs, gallops.   LUNGS: Clear to auscultation bilaterally without wheezing, rales, rhonchi. Normal respiratory effort. Nonlabored. No dullness.  ABDOMEN: Soft, nontender, nondistended. No rebound or guarding.  "NO mass or HSM.  EXT:  No cyanosis, clubbing or edema. No cord.  :  Without Fonseca catheter.  MSK: No joint effusions or erythema  SKIN: Warm and dry without cutaneous eruptions on Inspection/palpation.    NEURO: Oriented to PPT.  Motor 5/5 strength  PSYCHIATRIC: Normal insight and judgment. Cooperative with PE    Laboratory Data    Results from last 7 days   Lab Units 04/24/24  0548 04/23/24  0550 04/22/24  1506   WBC 10*3/mm3 8.82 11.83* 10.31   HEMOGLOBIN g/dL 9.4* 9.8* 10.1*   HEMATOCRIT % 28.8* 31.3* 33.5*   PLATELETS 10*3/mm3 104* 93* 108*     Results from last 7 days   Lab Units 04/24/24  0548   SODIUM mmol/L 134*   POTASSIUM mmol/L 4.8   CHLORIDE mmol/L 105   CO2 mmol/L 17.0*   BUN mg/dL 80*   CREATININE mg/dL 2.06*   GLUCOSE mg/dL 74   CALCIUM mg/dL 8.2*     Results from last 7 days   Lab Units 04/22/24  1506   ALK PHOS U/L 80   BILIRUBIN mg/dL 0.4   ALT (SGPT) U/L 26   AST (SGOT) U/L 18                         Estimated Creatinine Clearance: 32 mL/min (A) (by C-G formula based on SCr of 2.06 mg/dL (H)).      Microbiology:  No results found for: \"ACANTHNAEG\", \"AFBCX\", \"BPERTUSSISCX\", \"BLOODCX\"  No results found for: \"BCIDPCR\", \"CXREFLEX\", \"CSFCX\", \"CULTURETIS\"  No results found for: \"CULTURES\", \"HSVCX\", \"URCX\"  No results found for: \"EYECULTURE\", \"GCCX\", \"HSVCULTURE\", \"LABHSV\"  No results found for: \"LEGIONELLA\", \"MRSACX\", \"MUMPSCX\", \"MYCOPLASCX\"  No results found for: \"NOCARDIACX\", \"STOOLCX\"  Urine Culture   Date Value Ref Range Status   04/22/2024 No growth  Preliminary     No results found for: \"VIRALCULTU\", \"WOUNDCX\"        Radiology:  Imaging Results (Last 72 Hours)       Procedure Component Value Units Date/Time    XR Foot 3+ View Right [[id number]] Collected: 04/22/24 1929     Updated: 04/22/24 1934    Narrative:      XR FOOT 3+ VW RIGHT    Date of Exam: 4/22/2024 6:54 PM EDT    Indication: Diabetic foot wound.    Comparison: No comparison.    Findings:  No definite acute osseous abnormality is " noted. There is diffuse soft tissue swelling overlying the foot. Mild irregularity noted along the distal aspect of the great toe. No subcutaneous emphysema noted. Joint spaces demonstrate diffuse mild   degenerative change.    Soft tissue swelling is more prominent along the dorsum of the foot. Peripheral vascular calcifications are noted      Impression:      Impression:  Large amount of soft tissue swelling with no definite subcutaneous emphysema or destructive bone lesion.    Recommend follow-up as clinically indicated      Electronically Signed: Jamin Aguilar MD    4/22/2024 7:31 PM EDT    Workstation ID: [id number]    XR Chest 1 View [[id number]] Collected: 04/22/24 1514     Updated: 04/22/24 1518    Narrative:      XR CHEST 1 VW    Date of Exam: 4/22/2024 2:49 PM EDT    Indication: Weak/Dizzy/AMS triage protocol    Comparison: Chest radiograph 3/9/2024    Findings:  Stable cardiomegaly. Dual-chamber AICD. Median sternotomy and prior CABG. Platelike areas of atelectasis versus scar. No infectious appearing consolidation, edema, large effusion or pneumothorax. Lung volumes are borderline low. Degenerative related   osseous changes.      Impression:      Impression:  No active pulmonary process. Stable radiographic appearance of the chest since 3/9/2024.      Electronically Signed: Fran Reis MD    4/22/2024 3:15 PM EDT    Workstation ID: [id number]              Impression:    1.  UTI-with urinary retention.   The previous urine culture performed on 3/5/2024 grew yeast.   there is no evidence of bacterial infection.  I will discontinue ceftriaxone. I will reorder a urine culture.    2.    Urinary retention-he required In-N-Out catheterization in the emergency room   3.  Right fifth metatarsal wound/bullous lesion-without evidence of cellulitis   4.  Chronic right pretibial dermatitis-without evidence of cellulitis  5.  Type 2 diabetes mellitus  6.  coronary artery disease/status post CABG, 3/22/2019  7.   Chronic HFrEF  8.  Stage IIIb chronic kidney disease  9.  Paroxysmal atrial fibrillation  10.  Leukocytosis/neutrophilia    PLAN/RECOMMENDATIONS:   1.  Offload the right fifth metatarsal wound  2.  Repeat Urine culture-hold in order to detect yeast.  3.  Discontinue ceftriaxone       I discussed his complex situation with Dr. Batista today.  I examined his wound with Cooper Ni  then PT wound care  Lincoln Padilla MD  4/24/2024  07:46 EDT

## 2024-04-24 NOTE — PLAN OF CARE
Goal Outcome Evaluation:  Plan of Care Reviewed With: patient           Outcome Evaluation: Pt presents with chronic RLE wound with history of cellulitis and limited healing. PT was able to lightly debride several areas of the RLE to help decrease bioburden and improve skin integrity and healing potential. PT applied light compression with unna boot to help further reduce LE edema to support wound healing. PT will f/u in 2-3 days with further debridement PRN and unna boot change.

## 2024-04-25 ENCOUNTER — APPOINTMENT (OUTPATIENT)
Dept: CARDIOLOGY | Facility: HOSPITAL | Age: 79
End: 2024-04-25
Payer: MEDICARE

## 2024-04-25 ENCOUNTER — APPOINTMENT (OUTPATIENT)
Dept: GENERAL RADIOLOGY | Facility: HOSPITAL | Age: 79
End: 2024-04-25
Payer: MEDICARE

## 2024-04-25 PROBLEM — I21.4 NSTEMI, INITIAL EPISODE OF CARE: Status: ACTIVE | Noted: 2024-04-25

## 2024-04-25 PROBLEM — J96.01 ACUTE RESPIRATORY FAILURE WITH HYPOXIA: Status: ACTIVE | Noted: 2024-04-25

## 2024-04-25 PROBLEM — L08.9 DIABETIC FOOT INFECTION: Status: RESOLVED | Noted: 2023-07-23 | Resolved: 2024-04-25

## 2024-04-25 PROBLEM — E11.65 TYPE 2 DIABETES MELLITUS WITH HYPERGLYCEMIA: Status: ACTIVE | Noted: 2019-03-22

## 2024-04-25 PROBLEM — Z95.0 PACEMAKER: Status: ACTIVE | Noted: 2022-04-29

## 2024-04-25 PROBLEM — Z89.412 STATUS POST AMPUTATION OF GREAT TOE, LEFT: Status: RESOLVED | Noted: 2022-11-15 | Resolved: 2024-04-25

## 2024-04-25 PROBLEM — I50.20 HFREF (HEART FAILURE WITH REDUCED EJECTION FRACTION): Status: ACTIVE | Noted: 2022-08-15

## 2024-04-25 PROBLEM — Z89.422 STATUS POST AMPUTATION OF TOE OF LEFT FOOT: Chronic | Status: RESOLVED | Noted: 2023-07-23 | Resolved: 2024-04-25

## 2024-04-25 PROBLEM — M25.519 ARTHRALGIA OF SHOULDER: Status: RESOLVED | Noted: 2022-11-03 | Resolved: 2024-04-25

## 2024-04-25 PROBLEM — I45.10 RBBB: Status: RESOLVED | Noted: 2019-03-22 | Resolved: 2024-04-25

## 2024-04-25 PROBLEM — Z86.718 HISTORY OF DVT (DEEP VEIN THROMBOSIS): Status: ACTIVE | Noted: 2024-04-25

## 2024-04-25 PROBLEM — E11.628 DIABETIC FOOT INFECTION: Status: RESOLVED | Noted: 2023-07-23 | Resolved: 2024-04-25

## 2024-04-25 PROBLEM — I49.9 VENTRICULAR ARRHYTHMIA: Status: RESOLVED | Noted: 2024-04-25 | Resolved: 2024-04-25

## 2024-04-25 PROBLEM — I25.119 CORONARY ARTERY DISEASE INVOLVING NATIVE CORONARY ARTERY OF NATIVE HEART WITH ANGINA PECTORIS: Status: ACTIVE | Noted: 2019-03-22

## 2024-04-25 PROBLEM — I49.9 VENTRICULAR ARRHYTHMIA: Status: ACTIVE | Noted: 2024-04-25

## 2024-04-25 PROBLEM — I25.10 CORONARY ARTERY DISEASE INVOLVING NATIVE CORONARY ARTERY OF NATIVE HEART: Status: ACTIVE | Noted: 2019-03-22

## 2024-04-25 PROBLEM — R79.89 ELEVATED TROPONIN LEVEL NOT DUE MYOCARDIAL INFARCTION: Status: ACTIVE | Noted: 2024-04-25

## 2024-04-25 PROBLEM — I10 HYPERTENSION: Chronic | Status: RESOLVED | Noted: 2019-03-22 | Resolved: 2024-04-25

## 2024-04-25 PROBLEM — I46.9 CARDIAC ARREST: Status: ACTIVE | Noted: 2024-04-25

## 2024-04-25 LAB
ALBUMIN SERPL-MCNC: 3.3 G/DL (ref 3.5–5.2)
ALBUMIN/GLOB SERPL: 1.4 G/DL
ALP SERPL-CCNC: 152 U/L (ref 39–117)
ALT SERPL W P-5'-P-CCNC: 454 U/L (ref 1–41)
ANION GAP SERPL CALCULATED.3IONS-SCNC: 12 MMOL/L (ref 5–15)
ANION GAP SERPL CALCULATED.3IONS-SCNC: 21 MMOL/L (ref 5–15)
ARTERIAL PATENCY WRIST A: ABNORMAL
AST SERPL-CCNC: 385 U/L (ref 1–40)
ATMOSPHERIC PRESS: ABNORMAL MM[HG]
BASE EXCESS BLDA CALC-SCNC: -14.3 MMOL/L (ref 0–2)
BDY SITE: ABNORMAL
BILIRUB SERPL-MCNC: 0.6 MG/DL (ref 0–1.2)
BODY TEMPERATURE: 37
BUN SERPL-MCNC: 77 MG/DL (ref 8–23)
BUN SERPL-MCNC: 83 MG/DL (ref 8–23)
BUN/CREAT SERPL: 37.2 (ref 7–25)
BUN/CREAT SERPL: 41.7 (ref 7–25)
CA-I SERPL ISE-MCNC: 1.24 MMOL/L (ref 1.12–1.32)
CALCIUM SPEC-SCNC: 8.3 MG/DL (ref 8.6–10.5)
CALCIUM SPEC-SCNC: 8.4 MG/DL (ref 8.6–10.5)
CHLORIDE SERPL-SCNC: 106 MMOL/L (ref 98–107)
CHLORIDE SERPL-SCNC: 106 MMOL/L (ref 98–107)
CO2 BLDA-SCNC: 12.8 MMOL/L (ref 22–33)
CO2 SERPL-SCNC: 13 MMOL/L (ref 22–29)
CO2 SERPL-SCNC: 17 MMOL/L (ref 22–29)
COHGB MFR BLD: 0.9 % (ref 0–2)
CREAT SERPL-MCNC: 1.99 MG/DL (ref 0.76–1.27)
CREAT SERPL-MCNC: 2.07 MG/DL (ref 0.76–1.27)
DEPRECATED RDW RBC AUTO: 44.2 FL (ref 37–54)
DEPRECATED RDW RBC AUTO: 47.6 FL (ref 37–54)
EGFRCR SERPLBLD CKD-EPI 2021: 32 ML/MIN/1.73
EGFRCR SERPLBLD CKD-EPI 2021: 33.5 ML/MIN/1.73
EPAP: 0
ERYTHROCYTE [DISTWIDTH] IN BLOOD BY AUTOMATED COUNT: 13.2 % (ref 12.3–15.4)
ERYTHROCYTE [DISTWIDTH] IN BLOOD BY AUTOMATED COUNT: 13.2 % (ref 12.3–15.4)
GLOBULIN UR ELPH-MCNC: 2.3 GM/DL
GLUCOSE BLDC GLUCOMTR-MCNC: 130 MG/DL (ref 70–130)
GLUCOSE BLDC GLUCOMTR-MCNC: 224 MG/DL (ref 70–130)
GLUCOSE BLDC GLUCOMTR-MCNC: 238 MG/DL (ref 70–130)
GLUCOSE BLDC GLUCOMTR-MCNC: 248 MG/DL (ref 70–130)
GLUCOSE BLDC GLUCOMTR-MCNC: 252 MG/DL (ref 70–130)
GLUCOSE BLDC GLUCOMTR-MCNC: 99 MG/DL (ref 70–130)
GLUCOSE SERPL-MCNC: 113 MG/DL (ref 65–99)
GLUCOSE SERPL-MCNC: 293 MG/DL (ref 65–99)
HCO3 BLDA-SCNC: 11.9 MMOL/L (ref 20–26)
HCT VFR BLD AUTO: 26.9 % (ref 37.5–51)
HCT VFR BLD AUTO: 30.8 % (ref 37.5–51)
HCT VFR BLD CALC: 28.7 % (ref 38–51)
HGB BLD-MCNC: 8.9 G/DL (ref 13–17.7)
HGB BLD-MCNC: 9.6 G/DL (ref 13–17.7)
HGB BLDA-MCNC: 9.4 G/DL (ref 13.5–17.5)
INHALED O2 CONCENTRATION: 100 %
IPAP: 0
MAGNESIUM SERPL-MCNC: 4.6 MG/DL (ref 1.6–2.4)
MCH RBC QN AUTO: 30 PG (ref 26.6–33)
MCH RBC QN AUTO: 30.5 PG (ref 26.6–33)
MCHC RBC AUTO-ENTMCNC: 31.2 G/DL (ref 31.5–35.7)
MCHC RBC AUTO-ENTMCNC: 33.1 G/DL (ref 31.5–35.7)
MCV RBC AUTO: 92.1 FL (ref 79–97)
MCV RBC AUTO: 96.3 FL (ref 79–97)
METHGB BLD QL: 0.4 % (ref 0–1.5)
MODALITY: ABNORMAL
OXYHGB MFR BLDV: 98.7 % (ref 94–99)
PAW @ PEAK INSP FLOW SETTING VENT: 0 CMH2O
PCO2 BLDA: 28.2 MM HG (ref 35–45)
PCO2 TEMP ADJ BLD: 28.2 MM HG (ref 35–48)
PEEP RESPIRATORY: 5 CM[H2O]
PH BLDA: 7.23 PH UNITS (ref 7.35–7.45)
PH, TEMP CORRECTED: 7.23 PH UNITS
PLATELET # BLD AUTO: 113 10*3/MM3 (ref 140–450)
PLATELET # BLD AUTO: 169 10*3/MM3 (ref 140–450)
PMV BLD AUTO: 12.6 FL (ref 6–12)
PMV BLD AUTO: 12.6 FL (ref 6–12)
PO2 BLDA: 373 MM HG (ref 83–108)
PO2 TEMP ADJ BLD: 373 MM HG (ref 83–108)
POTASSIUM SERPL-SCNC: 4.5 MMOL/L (ref 3.5–5.2)
POTASSIUM SERPL-SCNC: 4.9 MMOL/L (ref 3.5–5.2)
PROT SERPL-MCNC: 5.6 G/DL (ref 6–8.5)
RBC # BLD AUTO: 2.92 10*6/MM3 (ref 4.14–5.8)
RBC # BLD AUTO: 3.2 10*6/MM3 (ref 4.14–5.8)
SODIUM SERPL-SCNC: 135 MMOL/L (ref 136–145)
SODIUM SERPL-SCNC: 140 MMOL/L (ref 136–145)
TOTAL RATE: 22 BREATHS/MINUTE
VENTILATOR MODE: ABNORMAL
VT ON VENT VENT: 0.59 ML
WBC NRBC COR # BLD AUTO: 6.71 10*3/MM3 (ref 3.4–10.8)
WBC NRBC COR # BLD AUTO: 9.14 10*3/MM3 (ref 3.4–10.8)

## 2024-04-25 PROCEDURE — 25010000002 EPINEPHRINE 1 MG/10ML SOLUTION PREFILLED SYRINGE: Performed by: HOSPITALIST

## 2024-04-25 PROCEDURE — 25010000002 AMIODARONE IN DEXTROSE 5% 360-4.14 MG/200ML-% SOLUTION: Performed by: INTERNAL MEDICINE

## 2024-04-25 PROCEDURE — 25010000002 SULFUR HEXAFLUORIDE MICROSPH 60.7-25 MG RECONSTITUTED SUSPENSION: Performed by: INTERNAL MEDICINE

## 2024-04-25 PROCEDURE — 63710000001 INSULIN GLARGINE PER 5 UNITS: Performed by: FAMILY MEDICINE

## 2024-04-25 PROCEDURE — 03HY32Z INSERTION OF MONITORING DEVICE INTO UPPER ARTERY, PERCUTANEOUS APPROACH: ICD-10-PCS

## 2024-04-25 PROCEDURE — 83050 HGB METHEMOGLOBIN QUAN: CPT

## 2024-04-25 PROCEDURE — 31500 INSERT EMERGENCY AIRWAY: CPT

## 2024-04-25 PROCEDURE — P9047 ALBUMIN (HUMAN), 25%, 50ML: HCPCS | Performed by: INTERNAL MEDICINE

## 2024-04-25 PROCEDURE — 25810000003 SODIUM CHLORIDE 0.9 % SOLUTION 250 ML FLEX CONT: Performed by: INTERNAL MEDICINE

## 2024-04-25 PROCEDURE — 92950 HEART/LUNG RESUSCITATION CPR: CPT

## 2024-04-25 PROCEDURE — 85027 COMPLETE CBC AUTOMATED: CPT | Performed by: FAMILY MEDICINE

## 2024-04-25 PROCEDURE — 25010000002 MAGNESIUM SULFATE PER 500 MG OF MAGNESIUM: Performed by: INTERNAL MEDICINE

## 2024-04-25 PROCEDURE — 4A133B1 MONITORING OF ARTERIAL PRESSURE, PERIPHERAL, PERCUTANEOUS APPROACH: ICD-10-PCS

## 2024-04-25 PROCEDURE — 97530 THERAPEUTIC ACTIVITIES: CPT

## 2024-04-25 PROCEDURE — 25010000002 MIDAZOLAM PER 1 MG: Performed by: HOSPITALIST

## 2024-04-25 PROCEDURE — 93010 ELECTROCARDIOGRAM REPORT: CPT | Performed by: INTERNAL MEDICINE

## 2024-04-25 PROCEDURE — 25010000002 EPINEPHRINE 1 MG/10ML SOLUTION PREFILLED SYRINGE: Performed by: INTERNAL MEDICINE

## 2024-04-25 PROCEDURE — 5A1945Z RESPIRATORY VENTILATION, 24-96 CONSECUTIVE HOURS: ICD-10-PCS | Performed by: INTERNAL MEDICINE

## 2024-04-25 PROCEDURE — 99291 CRITICAL CARE FIRST HOUR: CPT | Performed by: INTERNAL MEDICINE

## 2024-04-25 PROCEDURE — 25010000002 ALBUMIN HUMAN 25% PER 50 ML: Performed by: INTERNAL MEDICINE

## 2024-04-25 PROCEDURE — 80053 COMPREHEN METABOLIC PANEL: CPT | Performed by: FAMILY MEDICINE

## 2024-04-25 PROCEDURE — 25010000002 AMIODARONE PER 30 MG: Performed by: HOSPITALIST

## 2024-04-25 PROCEDURE — 25010000002 PROPOFOL 10 MG/ML EMULSION: Performed by: INTERNAL MEDICINE

## 2024-04-25 PROCEDURE — 99232 SBSQ HOSP IP/OBS MODERATE 35: CPT | Performed by: NURSE PRACTITIONER

## 2024-04-25 PROCEDURE — 76937 US GUIDE VASCULAR ACCESS: CPT

## 2024-04-25 PROCEDURE — 36600 WITHDRAWAL OF ARTERIAL BLOOD: CPT

## 2024-04-25 PROCEDURE — 93306 TTE W/DOPPLER COMPLETE: CPT

## 2024-04-25 PROCEDURE — 25010000002 EPINEPHRINE 1 MG/ML SOLUTION 30 ML VIAL: Performed by: INTERNAL MEDICINE

## 2024-04-25 PROCEDURE — 94799 UNLISTED PULMONARY SVC/PX: CPT

## 2024-04-25 PROCEDURE — 63710000001 INSULIN REGULAR HUMAN PER 5 UNITS

## 2024-04-25 PROCEDURE — 25010000002 PHENYLEPHRINE 10 MG/ML SOLUTION 5 ML VIAL: Performed by: INTERNAL MEDICINE

## 2024-04-25 PROCEDURE — 82375 ASSAY CARBOXYHB QUANT: CPT

## 2024-04-25 PROCEDURE — 99223 1ST HOSP IP/OBS HIGH 75: CPT | Performed by: INTERNAL MEDICINE

## 2024-04-25 PROCEDURE — 4A133J1 MONITORING OF ARTERIAL PULSE, PERIPHERAL, PERCUTANEOUS APPROACH: ICD-10-PCS

## 2024-04-25 PROCEDURE — 36556 INSERT NON-TUNNEL CV CATH: CPT

## 2024-04-25 PROCEDURE — 0BH18EZ INSERTION OF ENDOTRACHEAL AIRWAY INTO TRACHEA, VIA NATURAL OR ARTIFICIAL OPENING ENDOSCOPIC: ICD-10-PCS | Performed by: INTERNAL MEDICINE

## 2024-04-25 PROCEDURE — 0 MIDAZOLAM 1 MG/ML 100ML NS 100 MG/100ML SOLUTION

## 2024-04-25 PROCEDURE — 82330 ASSAY OF CALCIUM: CPT | Performed by: INTERNAL MEDICINE

## 2024-04-25 PROCEDURE — 25010000002 NA FERRIC GLUC CPLX PER 12.5 MG: Performed by: INTERNAL MEDICINE

## 2024-04-25 PROCEDURE — 25010000002 MAGNESIUM SULFATE PER 500 MG OF MAGNESIUM: Performed by: HOSPITALIST

## 2024-04-25 PROCEDURE — 94002 VENT MGMT INPAT INIT DAY: CPT

## 2024-04-25 PROCEDURE — 82948 REAGENT STRIP/BLOOD GLUCOSE: CPT

## 2024-04-25 PROCEDURE — 82805 BLOOD GASES W/O2 SATURATION: CPT

## 2024-04-25 PROCEDURE — 02HV33Z INSERTION OF INFUSION DEVICE INTO SUPERIOR VENA CAVA, PERCUTANEOUS APPROACH: ICD-10-PCS

## 2024-04-25 PROCEDURE — 71045 X-RAY EXAM CHEST 1 VIEW: CPT

## 2024-04-25 PROCEDURE — 93005 ELECTROCARDIOGRAM TRACING: CPT | Performed by: INTERNAL MEDICINE

## 2024-04-25 PROCEDURE — 83735 ASSAY OF MAGNESIUM: CPT | Performed by: INTERNAL MEDICINE

## 2024-04-25 PROCEDURE — 25810000003 SODIUM CHLORIDE 0.9 % SOLUTION: Performed by: INTERNAL MEDICINE

## 2024-04-25 PROCEDURE — 93306 TTE W/DOPPLER COMPLETE: CPT | Performed by: INTERNAL MEDICINE

## 2024-04-25 PROCEDURE — 36620 INSERTION CATHETER ARTERY: CPT

## 2024-04-25 PROCEDURE — 85027 COMPLETE CBC AUTOMATED: CPT | Performed by: INTERNAL MEDICINE

## 2024-04-25 PROCEDURE — 25010000002 FENTANYL 10 MCG/1 ML NS

## 2024-04-25 RX ORDER — PANTOPRAZOLE SODIUM 40 MG/10ML
40 INJECTION, POWDER, LYOPHILIZED, FOR SOLUTION INTRAVENOUS
Status: DISCONTINUED | OUTPATIENT
Start: 2024-04-26 | End: 2024-04-29

## 2024-04-25 RX ORDER — EPINEPHRINE IN SOD CHLOR,ISO 1 MG/10 ML
1 SYRINGE (ML) INTRAVENOUS ONCE
Status: COMPLETED | OUTPATIENT
Start: 2024-04-25 | End: 2024-04-25

## 2024-04-25 RX ORDER — MIDODRINE HYDROCHLORIDE 5 MG/1
5 TABLET ORAL
Status: DISCONTINUED | OUTPATIENT
Start: 2024-04-25 | End: 2024-04-28

## 2024-04-25 RX ORDER — NOREPINEPHRINE BITARTRATE 0.03 MG/ML
.02-.3 INJECTION, SOLUTION INTRAVENOUS
Status: DISCONTINUED | OUTPATIENT
Start: 2024-04-25 | End: 2024-05-07

## 2024-04-25 RX ORDER — AMIODARONE HYDROCHLORIDE 150 MG/3ML
INJECTION, SOLUTION INTRAVENOUS
Status: COMPLETED | OUTPATIENT
Start: 2024-04-25 | End: 2024-04-25

## 2024-04-25 RX ORDER — MAGNESIUM SULFATE HEPTAHYDRATE 500 MG/ML
2 INJECTION, SOLUTION INTRAMUSCULAR; INTRAVENOUS ONCE
Status: COMPLETED | OUTPATIENT
Start: 2024-04-25 | End: 2024-04-25

## 2024-04-25 RX ORDER — MIDAZOLAM HYDROCHLORIDE 1 MG/ML
INJECTION INTRAMUSCULAR; INTRAVENOUS
Status: COMPLETED | OUTPATIENT
Start: 2024-04-25 | End: 2024-04-25

## 2024-04-25 RX ORDER — MAGNESIUM SULFATE HEPTAHYDRATE 500 MG/ML
INJECTION, SOLUTION INTRAMUSCULAR; INTRAVENOUS
Status: COMPLETED | OUTPATIENT
Start: 2024-04-25 | End: 2024-04-25

## 2024-04-25 RX ORDER — ALBUMIN (HUMAN) 12.5 G/50ML
25 SOLUTION INTRAVENOUS
Qty: 200 ML | Refills: 0 | Status: DISPENSED | OUTPATIENT
Start: 2024-04-25 | End: 2024-04-25

## 2024-04-25 RX ORDER — CHLORHEXIDINE GLUCONATE ORAL RINSE 1.2 MG/ML
15 SOLUTION DENTAL EVERY 12 HOURS SCHEDULED
Status: DISCONTINUED | OUTPATIENT
Start: 2024-04-25 | End: 2024-04-26

## 2024-04-25 RX ORDER — MIDAZOLAM IN NACL,ISO-OSMOT/PF 50 MG/50ML
1-10 INFUSION BOTTLE (ML) INTRAVENOUS
Status: DISCONTINUED | OUTPATIENT
Start: 2024-04-25 | End: 2024-04-26

## 2024-04-25 RX ADMIN — Medication 10 ML: at 20:18

## 2024-04-25 RX ADMIN — ALBUMIN (HUMAN) 25 G: 0.25 INJECTION, SOLUTION INTRAVENOUS at 14:56

## 2024-04-25 RX ADMIN — LEVOTHYROXINE SODIUM 88 MCG: 88 TABLET ORAL at 06:33

## 2024-04-25 RX ADMIN — Medication 300 MCG/HR: at 22:49

## 2024-04-25 RX ADMIN — EPINEPHRINE 0.02 MCG/KG/MIN: 1 INJECTION INTRAMUSCULAR; INTRAVENOUS; SUBCUTANEOUS at 15:33

## 2024-04-25 RX ADMIN — AMIODARONE HYDROCHLORIDE 0.5 MG/MIN: 1.8 INJECTION, SOLUTION INTRAVENOUS at 20:38

## 2024-04-25 RX ADMIN — Medication 10 ML: at 08:41

## 2024-04-25 RX ADMIN — EPINEPHRINE 1 MG: 0.1 INJECTION INTRAVENOUS at 13:56

## 2024-04-25 RX ADMIN — Medication 1 CAPSULE: at 08:40

## 2024-04-25 RX ADMIN — AMIODARONE HYDROCHLORIDE 300 MG: 50 INJECTION, SOLUTION INTRAVENOUS at 13:41

## 2024-04-25 RX ADMIN — AMIODARONE HYDROCHLORIDE 1 MG/MIN: 1.8 INJECTION, SOLUTION INTRAVENOUS at 14:39

## 2024-04-25 RX ADMIN — PHENYLEPHRINE HYDROCHLORIDE 3 MCG/KG/MIN: 10 INJECTION INTRAVENOUS at 15:26

## 2024-04-25 RX ADMIN — SODIUM CHLORIDE 125 MG: 9 INJECTION, SOLUTION INTRAVENOUS at 09:32

## 2024-04-25 RX ADMIN — Medication 50 MCG/HR: at 15:01

## 2024-04-25 RX ADMIN — EPINEPHRINE 1 MG: 0.1 INJECTION INTRAVENOUS at 13:49

## 2024-04-25 RX ADMIN — NOREPINEPHRINE BITARTRATE 0.14 MCG/KG/MIN: 0.03 INJECTION, SOLUTION INTRAVENOUS at 23:46

## 2024-04-25 RX ADMIN — EPINEPHRINE 2 MG: 0.1 INJECTION INTRAVENOUS at 13:53

## 2024-04-25 RX ADMIN — 0.12% CHLORHEXIDINE GLUCONATE 15 ML: 1.2 RINSE ORAL at 20:18

## 2024-04-25 RX ADMIN — INSULIN HUMAN 6 UNITS: 100 INJECTION, SOLUTION PARENTERAL at 23:46

## 2024-04-25 RX ADMIN — ASPIRIN 81 MG CHEWABLE TABLET 81 MG: 81 TABLET CHEWABLE at 08:40

## 2024-04-25 RX ADMIN — SULFUR HEXAFLUORIDE 5 ML: KIT at 16:02

## 2024-04-25 RX ADMIN — PROPOFOL 25 MCG/KG/MIN: 10 INJECTION, EMULSION INTRAVENOUS at 14:34

## 2024-04-25 RX ADMIN — MIDAZOLAM HYDROCHLORIDE 2 MG: 1 INJECTION, SOLUTION INTRAMUSCULAR; INTRAVENOUS at 13:56

## 2024-04-25 RX ADMIN — MAGNESIUM SULFATE HEPTAHYDRATE 2 G: 500 INJECTION, SOLUTION INTRAMUSCULAR; INTRAVENOUS at 13:38

## 2024-04-25 RX ADMIN — EPINEPHRINE 1 MG: 0.1 INJECTION INTRAVENOUS at 13:30

## 2024-04-25 RX ADMIN — INSULIN HUMAN 4 UNITS: 100 INJECTION, SOLUTION PARENTERAL at 18:36

## 2024-04-25 RX ADMIN — PANTOPRAZOLE SODIUM 40 MG: 40 TABLET, DELAYED RELEASE ORAL at 06:33

## 2024-04-25 RX ADMIN — LIDOCAINE HYDROCHLORIDE 100 MG: 20 INJECTION INTRAVENOUS at 13:47

## 2024-04-25 RX ADMIN — MIDAZOLAM HYDROCHLORIDE 2 MG/HR: 1 INJECTION, SOLUTION INTRAVENOUS at 15:43

## 2024-04-25 RX ADMIN — NOREPINEPHRINE BITARTRATE 0.02 MCG/KG/MIN: 0.03 INJECTION, SOLUTION INTRAVENOUS at 14:55

## 2024-04-25 RX ADMIN — EPINEPHRINE 1 MG: 0.1 INJECTION INTRAVENOUS at 13:35

## 2024-04-25 RX ADMIN — MIDODRINE HYDROCHLORIDE 5 MG: 5 TABLET ORAL at 13:16

## 2024-04-25 RX ADMIN — MAGNESIUM SULFATE HEPTAHYDRATE 1 G: 500 INJECTION, SOLUTION INTRAMUSCULAR; INTRAVENOUS at 13:43

## 2024-04-25 RX ADMIN — INSULIN GLARGINE 8 UNITS: 100 INJECTION, SOLUTION SUBCUTANEOUS at 20:18

## 2024-04-25 RX ADMIN — EPINEPHRINE 2 MG: 0.1 INJECTION INTRAVENOUS at 14:00

## 2024-04-25 RX ADMIN — ACETAMINOPHEN 650 MG: 325 TABLET ORAL at 09:40

## 2024-04-25 RX ADMIN — TAMSULOSIN HYDROCHLORIDE 0.4 MG: 0.4 CAPSULE ORAL at 08:40

## 2024-04-25 RX ADMIN — EPINEPHRINE 1 MG: 0.1 INJECTION INTRACARDIAC; INTRAVENOUS at 15:27

## 2024-04-25 RX ADMIN — APIXABAN 5 MG: 5 TABLET, FILM COATED ORAL at 08:40

## 2024-04-25 RX ADMIN — SODIUM BICARBONATE 50 MEQ: 84 INJECTION INTRAVENOUS at 15:35

## 2024-04-25 RX ADMIN — MAGNESIUM SULFATE HEPTAHYDRATE 2 G: 500 INJECTION, SOLUTION INTRAMUSCULAR; INTRAVENOUS at 14:38

## 2024-04-25 NOTE — CASE MANAGEMENT/SOCIAL WORK
Continued Stay Note  [facility]     Patient Name: Jermaine Singh  MRN: [id number]  Today's Date: 4/25/2024    Admit Date: 4/22/2024    Plan: [facility]   Discharge Plan       Row Name 04/25/24 1235       Plan    Plan [facility]    Patient/Family in Agreement with Plan yes    Plan Comments Spoke with Mr. Singh and Spouse at the bedside. [facility] has a rehab bed for Mr. Singh when he is medically ready for discharge. Tentative discharge set for Saturday 4/27.  will arrange transport tomorrow 4/26 for Saturday if medically ready. Updated family and they are agreeable with discharge plan.    Final Discharge Disposition Code 62 - inpatient rehab facility                   Discharge Codes    No documentation.                 Expected Discharge Date and Time       Expected Discharge Date Expected Discharge Time    Apr 26, 2024               Tiana Tovar RN

## 2024-04-25 NOTE — PROCEDURES
"Insert Central Line At Bedside    Date/Time: 4/25/2024 4:11 PM    Performed by: Kemi Castellano APRN  Authorized by: Kemi Castellano APRN  Consent: The procedure was performed in an emergent situation. Verbal consent obtained.  Risks and benefits: risks, benefits and alternatives were discussed  Consent given by: spouse  Patient identity confirmed: arm band and hospital-assigned identification number  Time out: Immediately prior to procedure a \"time out\" was called to verify the correct patient, procedure, equipment, support staff and site/side marked as required.  Indications: vascular access and central pressure monitoring  Anesthesia: local infiltration    Anesthesia:  Local Anesthetic: lidocaine 1% without epinephrine  Anesthetic total: 3 mL    Sedation:  Patient sedated: yes  Sedatives: see MAR for details  Analgesia: see MAR for details  Vitals: Vital signs were monitored during sedation.    Preparation: skin prepped with ChloraPrep  Skin prep agent dried: skin prep agent completely dried prior to procedure  Sterile barriers: all five maximum sterile barriers used - cap, mask, sterile gown, sterile gloves, and large sterile sheet  Hand hygiene: hand hygiene performed prior to central venous catheter insertion  Location details: left femoral  Patient position: flat  Catheter type: triple lumen  Catheter size: 7 Fr  Ultrasound guidance: yes  Sterile ultrasound techniques: sterile gel and sterile probe covers were used  Number of attempts: 1  Successful placement: yes  Post-procedure: line sutured and dressing applied  Assessment: blood return through all ports, free fluid flow, placement verified by x-ray and no pneumothorax on x-ray  Patient tolerance: patient tolerated the procedure well with no immediate complications  Comments: Dark red, non-pulsatile blood upon puncture of  vein. Guidewire visualized in left femoral vein via ultrasound. Dark red non-pulsatile blood aspirated from all three ports " and flushed back well. Sutures x2. Sterile dressing applied.    Procedure was performed entirely under the direct supervision of Dr. Callie Castellano, MSN, APRN, [facility]  Pulmonary and Critical Care Medicine

## 2024-04-25 NOTE — CONSULTS
Inpatient Cardiology Consult  Consult performed by: Leonel Michelle IV, MD  Consult ordered by: Jose Ramon Murphy MD  Reason for consult: cardiac arrest        [facility]  Cardiovascular Consultation Note    Primary cardiologist: Cooper Apodaca MD    Active Hospital Problems    Diagnosis  POA    **Cardiac arrest [I46.9]  Yes     Priority: High     Cardiac arrest requiring approximately 15 defibrillations, 4/25/2024  Echo (4/25/2024): LVEF <20%.  Dilated LV.      Acute respiratory failure with hypoxia [J96.01]  Yes     Priority: Medium    UTI (urinary tract infection) [N39.0]  Yes     Priority: Medium    HFrEF (heart failure with reduced ejection fraction) [I50.20]  Yes     Priority: Medium     Echo (8/11/2022): EF 48%.  No significant valvular abnormality  Echo (4/25/2024): LVEF <20%.  Dilated LV.      Elevated troponin level not due myocardial infarction [R79.89]  Yes    Anemia, chronic disease [D63.8]  Yes    Stage 3b chronic kidney disease [N18.32]  Yes    Pacemaker [Z95.0]  Yes     Status post [facility] pacemaker placement by Dr. Apodaca 5/18/2022      Type 2 diabetes mellitus with hyperglycemia [E11.65]  Yes    Hyperlipidemia LDL goal <70 [E78.5]  Yes    Hypothyroidism (acquired) [E03.9]  Yes    Coronary artery disease involving native coronary artery of native heart with angina pectoris [I25.119]  Yes     Cardiac catheterization (2019): Severe multivessel disease with severely diffusely diseased LAD  CABG x3 by Pawan Au (3/2019): LIMA to LAD, SVG to OM, SVG to distal RCA  Nuclear stress (10/2022): No ischemia              Adi Singh is a 79-year-old gentleman with history of severe coronary artery disease with severely diffusely diseased LAD.  The patient underwent bypass surgery in 2019 with bypasses to the LAD, OM branch of the left circumflex, and distal RCA.  Last ischemic evaluation and Dr. Apodaca's office was in 2022.  Perfusion images showed no  "ischemia or infarct.  LVEF on echo at that time 48%.    According to the patient's wife and daughter, the patient has been seen recently in cardiology clinic and was felt to be doing \"okay\".  The patient did have an ER visit on 3/9/2024 for chest pain.  High-sensitivity troponin was elevated but flat not indicative of a type I MI.  The patient was not admitted.  According to wife and daughter, the patient has had progressive decline in his functional capacity over the last 6 to 12 months.  The patient evidently has not wanted to do any activities.  He primarily stays in his house and gets around with a walker and uses a wheelchair when he is in public.  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    Over last week, the patient has rapidly declined and become severely fatigued and almost lethargic according to the patient's wife.  On 4/22/2024, the patient was admitted to the hospitalist service for treatment of urinary tract infection.  Earlier this afternoon, the patient was sitting in a chair in his telemetry bed when he sustained cardiac arrest.  The patient required approximately 15 defibrillations before stabilizing his rhythm.  The patient was intubated, sedated and transferred to the ICU.  The patient was severely hypotensive on arrival to the ICU and was started on norepinephrine as well as epinephrine.  The patient was lethargic but opening his eyes and moving his extremities on arrival.    On the time of my evaluation, the patient is intubated and sedated, on maximum dose norepinephrine as well as epinephrine.  Blood pressure has stabilized and epinephrine is actively being down titrated.  Amiodarone was discontinued due to low blood pressure prior to arrival to the ICU.  Bedside echo shows severe LV dysfunction with EF <20% and dilated left ventricle.      Cardiac risk factors: Known CAD, advanced age, hypertension, hyperlipidemia, type 2 diabetes mellitus    Past medical " and surgical history, social and family history reviewed in EMR.    REVIEW OF SYSTEMS:   H&P ROS reviewed and pertinent CV ROS as noted in HPI.         Vital Sign Min/Max for last 24 hours  Temp  Min: 96.1 °F (35.6 °C)  Max: 98.3 °F (36.8 °C)   BP  Min: 58/43  Max: 153/108   Pulse  Min: 63  Max: 92   Resp  Min: 16  Max: 28   SpO2  Min: 90 %  Max: 100 %   No data recorded      Intake/Output Summary (Last 24 hours) at 4/25/2024 1820  Last data filed at 4/25/2024 1700  Gross per 24 hour   Intake 327 ml   Output 125 ml   Net 202 ml           Constitutional:       Appearance: Underweight. Acutely ill-appearing.      Interventions: Sedated and intubated.   Pulmonary:      Effort: Intubated.   Cardiovascular:      Parasternal heave. Normal rate. Regular rhythm.      Murmurs: There is no murmur.      S3 .    Edema:     Peripheral edema absent.   Skin:     General: Skin is cool.      Coloration: Skin is jaundiced.              EKG (4/25/2024): Paced rhythm    I personally reviewed cardiac catheterization films from 2/14/2019 (prior to CABG).  Findings are as follows:  Dominance: Codominant  Left main: Calcified moderate 40% stenosis in the mid/distal segment.  LAD: Severely diffusely diseased from proximal segment to apical segment.  LCx: Large codominant vessel.  Proximal 50% stenosis.  The distal circumflex gives off a moderate OM branch/L PDA  RCA: 100% occluded with left to right collaterals filling the distal vessel.    Reviewed bedside echo performed today.  The left ventricle is dilated and severely hypokinetic.  Estimated LVEF <20%    Lab Review:   Labs reviewed in the electronic medical record.  Pertinent findings include:  Lab Results   Component Value Date    GLUCOSE 293 (H) 04/25/2024    BUN 77 (H) 04/25/2024    CREATININE 2.07 (H) 04/25/2024    EGFR 32.0 (L) 04/25/2024    BCR 37.2 (H) 04/25/2024    K 4.5 04/25/2024    CO2 13.0 (L) 04/25/2024    CALCIUM 8.4 (L) 04/25/2024    ALBUMIN 3.3 (L) 04/25/2024    BILITOT  "0.6 04/25/2024     (H) 04/25/2024     (H) 04/25/2024     Lab Results   Component Value Date    WBC 9.14 04/25/2024    HGB 9.6 (L) 04/25/2024    HCT 30.8 (L) 04/25/2024    MCV 96.3 04/25/2024     04/25/2024     Lab Results   Component Value Date    CHOL 123 03/05/2024    TRIG 85 03/05/2024    HDL 47 03/05/2024    LDL 59 03/05/2024     Results from last 7 days   Lab Units 04/23/24  0550   HEMOGLOBIN A1C % 8.50*     Lab Results   Component Value Date    TROPONINT 119 (C) 04/22/2024    TROPONINT 123 (C) 04/22/2024    TROPONINT 168 (C) 03/09/2024            Cardiac arrest/VF arrest  Substantial substrate for VF arrest including ischemic heart disease with severely diffusely diseased LAD as well as newly identified severe LV dysfunction with EF <20%  Based on prebypass angiogram, I do not suspect readily \"intervenable\" lesions to correct ischemic substrate.  LAD is severely diffusely diseased and would not be suitable for percutaneous treatment.  Frankly, I am impressed that Dr. Au was able to bypass the LAD in 2019.  I recommend against emergent cardiac catheterization given his hemodynamic instability and unlikelihood of \"targets\" to correct ischemic substrate  Continue aspirin and statin  No beta-blocker due to hypotension  Resume amiodarone IV at 0.5 mg/min  If recurrence of VT/VF, start lidocaine    Severe LV dysfunction with EF <20%  Newly diagnosed.  Last LVEF 48% in 2022  Unclear whether this is related to stunning from VF arrest or whether this has been pre-existing his admission.  I suspect the latter as patient has had dwindling functional capacity  Presently with some element of cardiogenic shock which prohibits GDMT    Multifactorial shock  Cardiogenic, possibly septic  Continue norepinephrine his primary pressor, epinephrine is secondary pressor  Wean vasopressors to maintain MAP >65 mmHg    Elevated troponin, POA  Initial troponin measurements on admission elevated but flat and " not consistent with myocardial infarction  If troponin is repeated, I suspect will be elevated but difficult to determine whether related to a type I MI or type II MI due to fixed CAD, CHF, and arrhythmia    Type 2 diabetes mellitus not on insulin with hyperglycemia  Uncontrolled (untreated?) diabetes prior to admission  Has only not candidate for SGL 2 inhibitor due to renal insufficiency  Start Glucomander protocol    Hyperlipidemia with goal LDL <70  LDL well-controlled  Must hold statin now as LFTs >3x ULN    Stage IIIb CKD  Hold losartan due to hypotension and anticipated [place] from cardiac event    History of DVT  Hold apixaban due to worsening renal insufficiency and possibly developing coagulopathy due to shock liver       Resume amiodarone at 0.5 mg/min IV  Maintain MAP >65 mmHg using norepinephrine as primary pressor and epinephrine to secondary pressor  Hold all GDMT for HFrEF due to low blood pressure and CKD  Hold atorvastatin due to transaminitis  Hold apixaban due to concerns of coagulopathy from shock liver    I discussed this case with the patient's wife, daughter as well as Dr. Ledesma.  Given shock, severe LV dysfunction, and recent arrhythmia with poor baseline functional status, patient's long-term prognosis is poor.  I told the patient's wife and daughter that if patient has recurrent ventricular arrhythmia, I do not suspect we will be able to return this gentleman to acceptable quality of life.  If recurrent ventricular arrhythmias were to occur, care team and family need to sincerely reevaluate goals of care.  Patient and daughter presently want to pursue FULL CODE STATUS.    ELLI Michelle MD FAC, [facility]  Interventional and General Cardiology

## 2024-04-25 NOTE — PLAN OF CARE
Goal Outcome Evaluation:  Plan of Care Reviewed With: patient           Outcome Evaluation: Pt continues to present below baseline d/t generalized weakness and deconditioning. Pt demonstrated improvements w/ transfers but continues to require assist to transition from sitting to standing. Pt limited by back pain and lethargy. Continue to progress as able per current POC.      Anticipated Discharge Disposition (OT): inpatient rehabilitation facility

## 2024-04-25 NOTE — PROCEDURES
Insert Arterial Line    Date/Time: 4/25/2024 4:10 PM    Performed by: Kemi Castellano APRN  Authorized by: Kemi Castellano APRN    Universal Protocol:     Verbal consent obtained?: Yes      Emergent situation      Risks and benefits: Risks, benefits and alternatives were discussed      Consent given by:  Spouse    Patient identity confirmed:  Arm band and hospital-assigned identification number    Time out: Immediately prior to the procedure a time out was called    A time out verifies correct patient, procedure, equipment, support staff and site/side marked as required:   Preparation:     Preparation: Patient was prepped and draped in usual sterile fashion    Indications:     Indications: multiple ABGs, respiratory failure and hemodynamic monitoring    Location:     Location:  Right radial  Anesthesia:     Patient sedated: Yes      Sedation:  See MAR for details    Analgesia:  See MAR for details    Vital signs: Vital signs monitored during sedation    Procedure Details:     Ultrasound Guidance: yes  The ultrasound was used for evaluation of possible access sites.  Vessel patency was confirmed with the ultrasound.  Needle entry into vessel was visualized in realtime with the ultrasound.       Jake's test normal?: Yes      Needle gauge:  20    Seldinger technique: Seldinger technique used      Number of attempts:  1  Post-procedure:     Post-procedure:  Line sutured and dressing applied    Post-procedure CMS:  Normal and unchanged     patient tolerated the procedure well with no immediate complications     Inserted with ultrasound guidance with bright red pulsatile blood detected. Connected to pressure tubing with arterial waveform present. Suture placed and sterile dressing applied.     Kemi Castellano, MSN, APRN, [facility]  Pulmonary and Critical Care Medicine

## 2024-04-25 NOTE — PROGRESS NOTES
INFECTIOUS DISEASE Progress Note    Jermaine Singh  1945  [id number]    Admission Date: 4/22/2024      Requesting Provider: No ref. provider found  Evaluating Physician: Lincoln Padilla MD    Reason for Consultation:  UTI    History of present illness:    4/23/24: Patient is a 79 y.o. male  with a history of type 2 diabetes mellitus, peripheral neuropathy, stage III chronic kidney disease, bilateral DVTs, paroxysmal atrial fibrillation, sick sinus syndrome status post pacemaker placement, CHF, peripheral vascular disease, and left diabetic foot infection who is seen today for evaluation of UTI.  He had a history of UTI last month with urine cultures and early March growing yeast.  He was apparently treated with an antifungal agent.    Over the last few days he noted the onset of profound weakness and fatigue.  He also noticed some dysuria.  He was brought to the emergency room and found to have pyuria and bacteriuria with leukocytosis.   Urine culture was sent and he was started on ceftriaxone.   His white blood cell count today is 11.8.   He was noted to have a blister over his right fifth metatarsal head and a chronic right pretibial wound.    4/24/24: He remains afebrile.  His urine culture was finalized as negative.  I have asked the laboratory to hold the urine culture for yeast but they did not and it was disposed of last night.   He states that his dysuria is improved today.  Feels better.     4/25/2024:  He remains afebrile.  White blood cell count today is 6.7.  Creatinine is 1.99.   Urinalysis yesterday revealed too numerous to count white blood cells.  He denied dysuria this morning.  After I saw him he suffered a V-fib cardiac arrest and was transferred to the ICU.  He is endotracheally intubated    Past Medical History:   Diagnosis Date    Allergic     Arthritis     Asthma     Coronary artery disease     Diabetes mellitus 2000    started on inuslin 12/2018; started on po meds in 2000;  checking blood sugars daily     Disease of thyroid gland     po meds daily for hypothyroidism     Elevated serum creatinine 11/15/2022    Elevated troponin 10/02/2022    Generalized weakness 11/15/2022    History of fracture as a child     rt leg- severe     Hyperlipidemia     Hypertension     Hypothyroidism     PAF (paroxysmal atrial fibrillation) 07/23/2023    Peripheral neuropathy     Peripheral vascular disease     s/p angiogram 2/19-needs stent in left leg     Pleural effusion, bilateral 11/15/2022    Proximal phalanx fracture of the second digit extending into the second metatarsal joint 07/28/2022    RBBB     Right knee pain     Unstable angina 02/12/2019    Added automatically from request for surgery [id number]    Vitamin D deficiency        Past Surgical History:   Procedure Laterality Date    AMPUTATION DIGIT Left 01/17/2023    Procedure: AMPUTATION DIGIT LEFT;  Surgeon: Gopal Márquez MD;  Location:  [facility] OR;  Service: Vascular;  Laterality: Left;    APPENDECTOMY      CARDIAC CATHETERIZATION N/A 02/14/2019    Procedure: Left Heart Cath;  Surgeon: Cooper Apodaca MD;  Location:  [facility] CATH INVASIVE LOCATION;  Service: Cardiology    CARDIAC ELECTROPHYSIOLOGY PROCEDURE N/A 05/18/2022    Procedure: DEVICE IMPLANT;  Surgeon: Cooper Apodaca MD;  Location: [facility];  Service: Cardiology;  Laterality: N/A;    CARDIAC SURGERY      COLONOSCOPY      CORONARY ARTERY BYPASS GRAFT N/A 03/22/2019    Procedure: MEDIAN STERNOTOMY, CORONARY ARTERY BYPASS GRAFT X3, UTILIZING THE LEFT INTERNAL MAMMARY ARTERY, EVH AND OPEN HARVEST OF THE RIGHT GREATER SAPHENOUS VEIN, EXPLORATION OF THE LEFT LEG;  Surgeon: Ap Au MD;  Location:  [facility] OR;  Service: Cardiothoracic    EYE SURGERY Bilateral     cataracts     FRACTURE SURGERY      INTERVENTIONAL RADIOLOGY PROCEDURE N/A 07/29/2021    Procedure: Abdominal Aortagram with Runoff;  Surgeon: Jermaine Hernandez MD;  Location:  [facility]  CATH INVASIVE LOCATION;  Service: Cardiovascular;  Laterality: N/A;    KNEE ARTHROSCOPY      right x 2, left x 1    LACERATION REPAIR      right leg    LEG SURGERY      2 for fracture of rt leg     TONSILLECTOMY      Adnoidectomy    TRANS METATARSAL AMPUTATION Left 08/02/2022    Procedure: GREAT TOE AMPUTATION LEFT;  Surgeon: Gopal Márquez MD;  Location: [facility] OR;  Service: Vascular;  Laterality: Left;       Family History   Problem Relation Age of Onset    Coronary artery disease Mother     Diabetes Mother     Hyperlipidemia Mother     Cancer Father        Social History     Socioeconomic History    Marital status:     Number of children: 2   Tobacco Use    Smoking status: Never     Passive exposure: Past    Smokeless tobacco: Never   Vaping Use    Vaping status: Never Used   Substance and Sexual Activity    Alcohol use: Not Currently     Comment: every 2-3 months    Drug use: No    Sexual activity: Not Currently     Partners: Female       Allergies   Allergen Reactions    Vancomycin Other (See Comments)     Kidney failure per last admission         Medication:    Current Facility-Administered Medications:     acetaminophen (TYLENOL) tablet 650 mg, 650 mg, Oral, Q6H PRN, Maricel Peraza W, APRN    apixaban (ELIQUIS) tablet 5 mg, 5 mg, Oral, Q12H, Brad Perazaa W, APRN, 5 mg at 04/24/24 2100    aspirin chewable tablet 81 mg, 81 mg, Oral, Daily, Maricel Peraza W, APRN, 81 mg at 04/24/24 0901    atorvastatin (LIPITOR) tablet 20 mg, 20 mg, Oral, Nightly, rBad Perazaa W, APRN, 20 mg at 04/24/24 2100    azelastine (ASTELIN) nasal spray 2 spray, 2 spray, Each Nare, BID, Maricel Peraza W, APRN    sennosides-docusate (PERICOLACE) 8.6-50 MG per tablet 2 tablet, 2 tablet, Oral, BID PRN **AND** polyethylene glycol (MIRALAX) packet 17 g, 17 g, Oral, Daily PRN, 17 g at 04/22/24 2225 **AND** bisacodyl (DULCOLAX) EC tablet 5 mg, 5 mg, Oral, Daily PRN, 5 mg at 04/22/24 2225 **AND** bisacodyl  (DULCOLAX) suppository 10 mg, 10 mg, Rectal, Daily PRN, Maricel Peraza APRN    [Held by provider] bumetanide (BUMEX) tablet 1 mg, 1 mg, Oral, BID, Maricel Peraza APRN, 1 mg at 04/22/24 2224    [Held by provider] carvedilol (COREG) tablet 3.125 mg, 3.125 mg, Oral, BID With Meals, Maricel Peraza APRN, 3.125 mg at 04/22/24 2214    dextrose (D50W) (25 g/50 mL) IV injection 25 g, 25 g, Intravenous, Q15 Min PRN, Maricel Peraza APRN    dextrose (GLUTOSE) oral gel 15 g, 15 g, Oral, Q15 Min PRN, Maricel Peraza APRN    famotidine (PEPCID) tablet 20 mg, 20 mg, Oral, BID PRN, Maricel Peraza APRN, 20 mg at 04/22/24 2214    ferric gluconate (FERRLECIT)125 MG in sodium chloride 0.9 % 100 mL IVPB, 125 mg, Intravenous, Daily, Artur Avila MD, Last Rate: 100 mL/hr at 04/24/24 0917, 125 mg at 04/24/24 0917    glucagon (GLUCAGEN) injection 1 mg, 1 mg, Intramuscular, Q15 Min PRN, Maricel Peraza APRN    insulin glargine (LANTUS, SEMGLEE) injection 8 Units, 8 Units, Subcutaneous, Nightly, Luz Elena Batista DO, 8 Units at 04/24/24 2100    Insulin Lispro (humaLOG) injection 2-9 Units, 2-9 Units, Subcutaneous, 4x Daily AC & at Bedtime, Maricel Peraza APRN, 4 Units at 04/23/24 2044    [Held by provider] isosorbide mononitrate (IMDUR) 24 hr tablet 30 mg, 30 mg, Oral, Daily, Maricel Peraza APRN    lactobacillus acidophilus (RISAQUAD) capsule 1 capsule, 1 capsule, Oral, Daily, Maricel Peraza APRN, 1 capsule at 04/24/24 0901    levothyroxine (SYNTHROID, LEVOTHROID) tablet 88 mcg, 88 mcg, Oral, Q AM, Maricel Peraza APRN, 88 mcg at 04/25/24 0633    [Held by provider] losartan (COZAAR) tablet 50 mg, 50 mg, Oral, Daily, Maricel Peraza APRN    Magnesium Low Dose Replacement - Follow Nurse / BPA Driven Protocol, , Does not apply, PRN, Maricel Peraza, APRN    nitroglycerin (NITROSTAT) SL tablet 0.4 mg, 0.4 mg, Sublingual, Q5 Min PRN, Maricel Peraza,  APRN    pantoprazole (PROTONIX) EC tablet 40 mg, 40 mg, Oral, Q AM, Artur Avila MD, 40 mg at 24 0633    polyethylene glycol (MIRALAX) packet 17 g, 17 g, Oral, Daily, Artur Avila MD, 17 g at 24 0901    prochlorperazine (COMPAZINE) injection 5 mg, 5 mg, Intravenous, Q6H PRN, Peraza, Maricel W, APRN, 5 mg at 24 2225    sodium chloride 0.9 % flush 10 mL, 10 mL, Intravenous, PRN, Peraza, Maricel W, APRN    sodium chloride 0.9 % flush 10 mL, 10 mL, Intravenous, Q12H, Peraza, Maricel W, APRN, 10 mL at 24 2100    sodium chloride 0.9 % flush 10 mL, 10 mL, Intravenous, PRN, Peraza, Maricel W, APRN    sodium chloride 0.9 % infusion 40 mL, 40 mL, Intravenous, PRN, Peraza, Maricel W, APRN    tamsulosin (FLOMAX) 24 hr capsule 0.4 mg, 0.4 mg, Oral, Daily, Stu, Maricel W, APRN, 0.4 mg at 24 0901    Antibiotics:  Anti-Infectives (From admission, onward)      None              Review of Systems:  See HPI      Physical Exam:   Vital Signs  Temp (24hrs), Av.3 °F (36.8 °C), Min:98.1 °F (36.7 °C), Max:98.5 °F (36.9 °C)    Temp  Min: 98.1 °F (36.7 °C)  Max: 98.5 °F (36.9 °C)  BP  Min: 94/68  Max: 110/75  Pulse  Min: 63  Max: 90  Resp  Min: 16  Max: 18  SpO2  Min: 93 %  Max: 98 %    GENERAL:   Alert and debilitated appearing  HEENT: Normocephalic, atraumatic.  PERRL. EOMI. No conjunctival injection. No icterus.  No labial ulcers  NECK: Supple   HEART:No murmur, rubs, gallops.   LUNGS: Clear to auscultation bilaterally without wheezing, rales, rhonchi. Normal respiratory effort. Nonlabored. No dullness.  ABDOMEN: Soft, nontender, nondistended. No rebound or guarding. NO mass or HSM.  EXT:  No cyanosis, clubbing or edema. No cord.  :  Without Fonseca catheter.  MSK: No joint effusions or erythema  SKIN: Warm and dry without cutaneous eruptions on Inspection/palpation.    NEURO: Oriented to PPT.  Motor 5/5 strength  PSYCHIATRIC: Normal insight and judgment. Cooperative  "with PE    Laboratory Data    Results from last 7 days   Lab Units 04/25/24  0412 04/24/24  0548 04/23/24  0550   WBC 10*3/mm3 6.71 8.82 11.83*   HEMOGLOBIN g/dL 8.9* 9.4* 9.8*   HEMATOCRIT % 26.9* 28.8* 31.3*   PLATELETS 10*3/mm3 113* 104* 93*     Results from last 7 days   Lab Units 04/25/24  0412   SODIUM mmol/L 135*   POTASSIUM mmol/L 4.9   CHLORIDE mmol/L 106   CO2 mmol/L 17.0*   BUN mg/dL 83*   CREATININE mg/dL 1.99*   GLUCOSE mg/dL 113*   CALCIUM mg/dL 8.3*     Results from last 7 days   Lab Units 04/22/24  1506   ALK PHOS U/L 80   BILIRUBIN mg/dL 0.4   ALT (SGPT) U/L 26   AST (SGOT) U/L 18                         Estimated Creatinine Clearance: 33.1 mL/min (A) (by C-G formula based on SCr of 1.99 mg/dL (H)).      Microbiology:  No results found for: \"ACANTHNAEG\", \"AFBCX\", \"BPERTUSSISCX\", \"BLOODCX\"  No results found for: \"BCIDPCR\", \"CXREFLEX\", \"CSFCX\", \"CULTURETIS\"  No results found for: \"CULTURES\", \"HSVCX\", \"URCX\"  No results found for: \"EYECULTURE\", \"GCCX\", \"HSVCULTURE\", \"LABHSV\"  No results found for: \"LEGIONELLA\", \"MRSACX\", \"MUMPSCX\", \"MYCOPLASCX\"  No results found for: \"NOCARDIACX\", \"STOOLCX\"  Urine Culture   Date Value Ref Range Status   04/22/2024 No growth  Preliminary     No results found for: \"VIRALCULTU\", \"WOUNDCX\"        Radiology:  Imaging Results (Last 72 Hours)       Procedure Component Value Units Date/Time    XR Foot 3+ View Right [[id number]] Collected: 04/22/24 1929     Updated: 04/22/24 1934    Narrative:      XR FOOT 3+ VW RIGHT    Date of Exam: 4/22/2024 6:54 PM EDT    Indication: Diabetic foot wound.    Comparison: No comparison.    Findings:  No definite acute osseous abnormality is noted. There is diffuse soft tissue swelling overlying the foot. Mild irregularity noted along the distal aspect of the great toe. No subcutaneous emphysema noted. Joint spaces demonstrate diffuse mild   degenerative change.    Soft tissue swelling is more prominent along the dorsum of the foot. Peripheral " vascular calcifications are noted      Impression:      Impression:  Large amount of soft tissue swelling with no definite subcutaneous emphysema or destructive bone lesion.    Recommend follow-up as clinically indicated      Electronically Signed: Jamin Aguilar MD    4/22/2024 7:31 PM EDT    Workstation ID: [id number]    XR Chest 1 View [[id number]] Collected: 04/22/24 1514     Updated: 04/22/24 1518    Narrative:      XR CHEST 1 VW    Date of Exam: 4/22/2024 2:49 PM EDT    Indication: Weak/Dizzy/AMS triage protocol    Comparison: Chest radiograph 3/9/2024    Findings:  Stable cardiomegaly. Dual-chamber AICD. Median sternotomy and prior CABG. Platelike areas of atelectasis versus scar. No infectious appearing consolidation, edema, large effusion or pneumothorax. Lung volumes are borderline low. Degenerative related   osseous changes.      Impression:      Impression:  No active pulmonary process. Stable radiographic appearance of the chest since 3/9/2024.      Electronically Signed: Fran Reis MD    4/22/2024 3:15 PM EDT    Workstation ID: [id number]              Impression:    1.  UTI-with urinary retention.   The previous urine culture performed on 3/5/2024 grew yeast.   There is no evidence of bacterial infection.  I discontinued ceftriaxone. I reordered a urine culture.  This will be held for yeast   2.   Urinary retention-he required I&O  catheterization in the emergency room   3.  Right fifth metatarsal wound/bullous lesion-without evidence of cellulitis   4.  Chronic right pretibial dermatitis-without evidence of cellulitis  5.  Type 2 diabetes mellitus  6.  coronary artery disease/status post CABG, 3/22/2019  7.  Chronic HFrEF  8.  Stage IIIb chronic kidney disease  9.  Paroxysmal atrial fibrillation  10.  Leukocytosis/neutrophilia  11.  Thrombocytopenia  12.  V-fib arrest- status post resuscitation with endotracheal intubation and  defibrillation    PLAN/RECOMMENDATIONS:   1.  Offload the right fifth  metatarsal wound  2.  Repeat Urine culture-hold in order to detect yeast.  3.  ICU supportive care per cardiology and intensivist service    Lincoln Padilla MD  4/25/2024  06:55 EDT

## 2024-04-25 NOTE — PLAN OF CARE
Goal Outcome Evaluation:                                          Pt mechanically ventilated (PEEP 5, FiO2 40%). Arterial line, central line, placed. Fonseca catheter placed. Pt following commands, opening eyes to voice. Sedation gtts: versed @2, fentanyl @300. Levophed @0.2, epi @0.02, amiodarone @1.

## 2024-04-25 NOTE — THERAPY TREATMENT NOTE
Patient Name: Jermaine Singh  : 1945    MRN: [id number]                              Today's Date: 2024       Admit Date: 2024    Visit Dx:     ICD-10-CM ICD-9-CM   1. Acute UTI  N39.0 599.0   2. Generalized weakness  R53.1 780.79   3. Edema of right lower extremity  R60.0 782.3   4. Leg erythema  L53.9 695.9   5. Chronic kidney disease, unspecified CKD stage  N18.9 585.9   6. Elevated troponin  R79.89 790.6   7. 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 Au    Atherosclerosis of native artery of both lower extremities with intermittent claudication    SSS (sick sinus syndrome)    Chronic HFrEF (heart failure with reduced ejection fraction)    DVT, bilateral lower limbs    Cellulitis of right lower extremity    Anemia, chronic disease    PVD (peripheral vascular disease)    Status post amputation of great toe, left    Diabetic foot ulcer    GERD without esophagitis    Stage 3b chronic kidney disease    Arthralgia of shoulder    Right inguinal hernia    Diabetic foot infection    Status post amputation of great toe and second toe of left foot    PAF (paroxysmal atrial fibrillation)    Moderate malnutrition    UTI (urinary tract infection)     Past Medical History:   Diagnosis Date    Allergic     Arthritis     Asthma     Coronary artery disease     Diabetes mellitus 2000    started on inuslin 2018; started on po meds in ; checking blood sugars daily     Disease of thyroid gland     po meds daily for hypothyroidism     Elevated serum creatinine 11/15/2022    Elevated troponin 10/02/2022    Generalized weakness 11/15/2022    History of fracture as a child     rt leg- severe     Hyperlipidemia     Hypertension     Hypothyroidism     PAF (paroxysmal atrial fibrillation)  Pt [facility] VSS. Tele off, port de-accessed, midline removed. Discharge education given; all questions answered.       Problem: At Risk for Falls  Goal: Patient does not fall  Outcome: Monitoring/Evaluating progress  Goal: Patient takes action to control fall-related risks  Outcome: Monitoring/Evaluating progress     Problem: At Risk for Injury Due to Fall  Goal: Patient does not fall  Outcome: Monitoring/Evaluating progress  Goal: Takes action to control condition specific risks  Outcome: Monitoring/Evaluating progress  Goal: Verbalizes understanding of fall-related injury personal risks  Description: Document education using the patient education activity  Outcome: Monitoring/Evaluating progress     Problem: Skin Integrity Alteration  Goal: Skin remains intact with no new/deterioration of wound or pressure injury  Outcome: Monitoring/Evaluating progress  Goal: Participates in wound care activities  Outcome: Monitoring/Evaluating progress  Goal: Comfort optimized with pressure injury prevention strategies guided by patient/family preference. (Hospice)  Outcome: Monitoring/Evaluating progress  Goal: Wound care provided to promote comfort needs (Hospice)  Outcome: Monitoring/Evaluating progress     Problem: VTE (Actual)  Goal: Patient maintains mobility and remains free from complications of VTE  Outcome: Monitoring/Evaluating progress      07/23/2023    Peripheral neuropathy     Peripheral vascular disease     s/p angiogram 2/19-needs stent in left leg     Pleural effusion, bilateral 11/15/2022    Proximal phalanx fracture of the second digit extending into the second metatarsal joint 07/28/2022    RBBB     Right knee pain     Unstable angina 02/12/2019    Added automatically from request for surgery [id number]    Vitamin D deficiency      Past Surgical History:   Procedure Laterality Date    AMPUTATION DIGIT Left 01/17/2023    Procedure: AMPUTATION DIGIT LEFT;  Surgeon: Gopal Márquez MD;  Location:  [facility];  Service: Vascular;  Laterality: Left;    APPENDECTOMY      CARDIAC CATHETERIZATION N/A 02/14/2019    Procedure: Left Heart Cath;  Surgeon: Cooper Apodaca MD;  Location:  [facility];  Service: Cardiology    CARDIAC ELECTROPHYSIOLOGY PROCEDURE N/A 05/18/2022    Procedure: DEVICE IMPLANT;  Surgeon: Cooper Apodaca MD;  Location:  [facility];  Service: Cardiology;  Laterality: N/A;    CARDIAC SURGERY      COLONOSCOPY      CORONARY ARTERY BYPASS GRAFT N/A 03/22/2019    Procedure: MEDIAN STERNOTOMY, CORONARY ARTERY BYPASS GRAFT X3, UTILIZING THE LEFT INTERNAL MAMMARY ARTERY, EVH AND OPEN HARVEST OF THE RIGHT GREATER SAPHENOUS VEIN, EXPLORATION OF THE LEFT LEG;  Surgeon: Ap Au MD;  Location:  [facility];  Service: Cardiothoracic    EYE SURGERY Bilateral     cataracts     FRACTURE SURGERY      INTERVENTIONAL RADIOLOGY PROCEDURE N/A 07/29/2021    Procedure: Abdominal Aortagram with Runoff;  Surgeon: Jermaine Hernandez MD;  Location:  [facility];  Service: Cardiovascular;  Laterality: N/A;    KNEE ARTHROSCOPY      right x 2, left x 1    LACERATION REPAIR      right leg    LEG SURGERY      2 for fracture of rt leg     TONSILLECTOMY      Adnoidectomy    TRANS METATARSAL AMPUTATION Left 08/02/2022    Procedure: GREAT TOE AMPUTATION LEFT;  Surgeon: Gopal Márquez MD;  Location:   [facility];  Service: Vascular;  Laterality: Left;      General Information       Row Name 04/25/24 1136          OT Time and Intention    Document Type therapy note (daily note)  -MR     Mode of Treatment occupational therapy  -MR       Row Name 04/25/24 1136          General Information    Patient Profile Reviewed yes  -MR     Existing Precautions/Restrictions fall;other (see comments)  RLE wounds  -MR     Barriers to Rehab medically complex;previous functional deficit  -MR       Row Name 04/25/24 1136          Cognition    Orientation Status (Cognition) oriented x 3  -MR       Row Name 04/25/24 1136          Safety Issues, Functional Mobility    Safety Issues Affecting Function (Mobility) awareness of need for assistance;insight into deficits/self-awareness;judgment;positioning of assistive device;friction/shear risk;problem-solving;safety precaution awareness;safety precautions follow-through/compliance;sequencing abilities  -MR     Impairments Affecting Function (Mobility) balance;endurance/activity tolerance;strength;postural/trunk control  -MR               User Key  (r) = Recorded By, (t) = Taken By, (c) = Cosigned By      Initials Name Provider Type     RicardoRosalinda, OT Occupational Therapist                   Lymphedema       Row Name 04/24/24 1100             Lymphedema Edema Assessment    Ptting Edema Category By severity  -MF      Pitting Edema Moderate  -MF      Recorded by [MF] Cooper Ni, PT              Skin Changes/Observations    Location/Assessment Lower Extremity  -MF      Lower Extremity Conditions right:;inflamed  -MF      Lower Extremity Color/Pigment right:;erythema  -MF      Recorded by [MF] Cooper Ni, PT              Lymphedema Pulses/Capillary Refill    Lymphedema Pulses/Capillary Refill lower extremity pulses;capillary refill  -MF      Dorsalis Pedis Pulse right:;left:;+2 normal  -MF      Capillary Refill lower extremity capillary refill  -MF      Lower Extremity  Capillary Refill right:;left:;less than 3 seconds  -MF      Recorded by [MF] Cooper Ni, PT              Lymphedema Measurements    Measurement Type(s) Quick Girth  -MF      Quick Girth Areas Lower extremities  -MF      Recorded by [MF] Cooper Ni, PT              LLE Quick Girth (cm)    Mid foot 24 cm  -MF      Smallest ankle 24 cm  -MF      Largest calf 36.5 cm  -MF      Recorded by [MF] Cooper Ni, PT              RLE Quick Girth (cm)    Mid foot 24 cm  -MF      Smallest ankle 24 cm  -MF      Largest calf 38 cm  -MF      RLE Quick Girth Total 86  -MF      Recorded by [MF] oCoper Ni, PT              Compression/Skin Care    Compression/Skin Care skin care;wrapping location;bandaging  -MF      Skin Care washed/dried;lotion applied  -MF      Wrapping Location lower extremity  -MF      Wrapping Location LE bilateral:;foot to knee  -MF      Wrapping Comments RLE unna boot with coban and spandage  -MF      Recorded by [MF] Cooper Ni, PT                User Key  (r) = Recorded By, (t) = Taken By, (c) = Cosigned By      Initials Name Effective Dates     Cooper Ni, PT 02/03/23 -                    Mobility/ADL's       Row Name 04/25/24 1139          Transfers    Transfers sit-stand transfer;stand-sit transfer  -MR     Comment, (Transfers) STS x 2 from recliner to upright. Pt requiring increased assist w/ 1st transfer ModA, second attempt Amanda  -MR       Row Name 04/25/24 1139          Sit-Stand Transfer    Sit-Stand [place] (Transfers) verbal cues;minimum assist (75% patient effort)  -MR     Assistive Device (Sit-Stand Transfers) walker, front-wheeled  -MR       Row Name 04/25/24 1139          Stand-Sit Transfer    Stand-Sit [place] (Transfers) moderate assist (50% patient effort);verbal cues  -MR     Assistive Device (Stand-Sit Transfers) walker, front-wheeled  -MR       Row Name 04/25/24 1139          Activities of Daily Living    BADL  Assessment/Intervention grooming  -MR       Row Name 04/25/24 1139          Grooming Assessment/Training    [place] Level (Grooming) wash face, hands  -MR     Position (Grooming) supported sitting  -MR               User Key  (r) = Recorded By, (t) = Taken By, (c) = Cosigned By      Initials Name Provider Type    Rosalinda Domingo OT Occupational Therapist                   Obj/Interventions       Row Name 04/25/24 1154          Balance    Balance Assessment sitting static balance;sitting dynamic balance;standing static balance;standing dynamic balance  -MR     Static Sitting Balance standby assist  -MR     Dynamic Sitting Balance contact guard  -MR     Position, Sitting Balance unsupported;sitting in chair  -MR     Static Standing Balance contact guard  -MR     Dynamic Standing Balance minimal assist  -MR     Position/Device Used, Standing Balance supported;walker, front-wheeled  -MR     Balance Interventions sitting;standing;sit to stand;supported;static;dynamic;minimal challenge;occupation based/functional task  -MR     Comment, Balance Pt demonstrating posterior lean when initially coming to standing during transfer training. Pt able to correct w/ v/c.  -MR               User Key  (r) = Recorded By, (t) = Taken By, (c) = Cosigned By      Initials Name Provider Type    Rosalinda Domingo, OT Occupational Therapist                   Goals/Plan    No documentation.                  Clinical Impression       Row Name 04/25/24 1312          Pain Assessment    Pretreatment Pain Rating 5/10  -MR     Posttreatment Pain Rating 5/10  -MR     Pain Location generalized  -MR     Pain Location - back  -MR     Pain Intervention(s) Ambulation/increased activity;Repositioned  -MR       Row Name 04/25/24 1312          Plan of Care Review    Plan of Care Reviewed With patient  -MR     Outcome Evaluation Pt continues to present below baseline d/t generalized weakness and deconditioning. Pt demonstrated improvements w/  transfers but continues to require assist to transition from sitting to standing. Pt limited by back pain and lethargy. Continue to progress as able per current POC.  -MR       Row Name 04/25/24 1312          Therapy Plan Review/Discharge Plan (OT)    Anticipated Discharge Disposition (OT) inpatient rehabilitation facility  -MR       Row Name 04/25/24 1312          Vital Signs    Pre Systolic BP Rehab 91  -MR     Pre Treatment Diastolic BP 72  -MR     Post Systolic BP Rehab 109  -MR     Post Treatment Diastolic BP 75  -MR     Pretreatment Heart Rate (beats/min) 90  -MR     Posttreatment Heart Rate (beats/min) 91  -MR     O2 Delivery Pre Treatment room air  -MR     O2 Delivery Intra Treatment room air  -MR     O2 Delivery Post Treatment room air  -MR     Pre Patient Position Sitting  -MR     Intra Patient Position Standing  -MR     Post Patient Position Sitting  -MR       Row Name 04/25/24 1312          Positioning and Restraints    Pre-Treatment Position sitting in chair/recliner  -MR     Post Treatment Position chair  -MR     In Chair notified nsg;reclined;call light within reach;encouraged to call for assist;exit alarm on;waffle cushion;with family/caregiver  -MR               User Key  (r) = Recorded By, (t) = Taken By, (c) = Cosigned By      Initials Name Provider Type    MR RicardoRosalinda, OT Occupational Therapist                   Outcome Measures       Row Name 04/25/24 1314          How much help from another is currently needed...    Putting on and taking off regular lower body clothing? 2  -MR     Bathing (including washing, rinsing, and drying) 2  -MR     Toileting (which includes using toilet bed pan or urinal) 2  -MR     Putting on and taking off regular upper body clothing 3  -MR     Taking care of personal grooming (such as brushing teeth) 3  -MR     Eating meals 4  -MR     AM-PAC 6 Clicks Score (OT) 16  -MR       Row Name 04/25/24 8828          How much help from another person do you currently  need...    Turning from your back to your side while in flat bed without using bedrails? 4  -VL     Moving from lying on back to sitting on the side of a flat bed without bedrails? 3  -VL     Moving to and from a bed to a chair (including a wheelchair)? 3  -VL     Standing up from a chair using your arms (e.g., wheelchair, bedside chair)? 3  -VL     Climbing 3-5 steps with a railing? 2  -VL     To walk in hospital room? 3  -VL     AM-PAC 6 Clicks Score (PT) 18  -VL     Highest Level of Mobility Goal 6 --> Walk 10 steps or more  -VL       Row Name 04/25/24 1314          Functional Assessment    Outcome Measure Options AM-PAC 6 Clicks Daily Activity (OT)  -MR               User Key  (r) = Recorded By, (t) = Taken By, (c) = Cosigned By      Initials Name Provider Type    VL Trinidad Christensen RN Registered Nurse    Rosalinda Domingo, OT Occupational Therapist                    Occupational Therapy Education       Title: PT OT SLP Therapies (Done)       Topic: Occupational Therapy (Done)       Point: ADL training (Done)       Description:   Instruct learner(s) on proper safety adaptation and remediation techniques during self care or transfers.   Instruct in proper use of assistive devices.                  Learning Progress Summary             Patient Acceptance, E, VU by MR at 4/25/2024 1314    Acceptance, E, VU by AN at 4/23/2024 1344                         Point: Home exercise program (Done)       Description:   Instruct learner(s) on appropriate technique for monitoring, assisting and/or progressing therapeutic exercises/activities.                  Learning Progress Summary             Patient Acceptance, E, VU by MR at 4/25/2024 1314    Acceptance, E, VU by AN at 4/23/2024 1344                         Point: Precautions (Done)       Description:   Instruct learner(s) on prescribed precautions during self-care and functional transfers.                  Learning Progress Summary             Patient Acceptance, E, VU  by MR at 4/25/2024 1314    Acceptance, E, VU by AN at 4/23/2024 1344                         Point: Body mechanics (Done)       Description:   Instruct learner(s) on proper positioning and spine alignment during self-care, functional mobility activities and/or exercises.                  Learning Progress Summary             Patient Acceptance, E, VU by MR at 4/25/2024 1314    Acceptance, E, VU by AN at 4/23/2024 1344                                         User Key       Initials Effective Dates Name Provider Type Discipline    AN 09/21/21 -  Gin Jaeger OT Occupational Therapist OT    MR 09/22/22 -  Rosalinda Silva OT Occupational Therapist OT                  OT Recommendation and Plan     Plan of Care Review  Plan of Care Reviewed With: patient  Outcome Evaluation: Pt continues to present below baseline d/t generalized weakness and deconditioning. Pt demonstrated improvements w/ transfers but continues to require assist to transition from sitting to standing. Pt limited by back pain and lethargy. Continue to progress as able per current POC.     Time Calculation:         Time Calculation- OT       Row Name 04/25/24 1315             Time Calculation- OT    OT Start Time 1119  -MR      OT Received On 04/25/24  -MR         Timed Charges    79814 - OT Therapeutic Activity Minutes 21  -MR      38370 - OT Self Care/Mgmt Minutes 5  -MR         Total Minutes    Timed Charges Total Minutes 26  -MR       Total Minutes 26  -MR                User Key  (r) = Recorded By, (t) = Taken By, (c) = Cosigned By      Initials Name Provider Type     Rosalinda Silva OT Occupational Therapist                  Therapy Charges for Today       Code Description Service Date Service Provider Modifiers Qty    [id number]  OT THERAPEUTIC ACT EA 15 MIN 4/25/2024 Rosalinda Silva OT GO 2                 Rosalinda Silva OT  4/25/2024

## 2024-04-25 NOTE — PROGRESS NOTES
[facility] Medicine Services  PROGRESS NOTE    Patient Name: Jermaine Singh  : 1945  MRN: [id number]    Date of Admission: 2024  Primary Care Physician: Marysol Shrestha MD    Subjective   Subjective     CC:  F/U UTI    HPI:  Up in chair, just finished lunch. Wife in room. States no dysuria today. No pain. Asking about POC and when he can go to rehab.     Objective   Objective     Vital Signs:   Temp:  [98.1 °F (36.7 °C)-98.3 °F (36.8 °C)] 98.3 °F (36.8 °C)  Heart Rate:  [63-88] 73  Resp:  [16] 16  BP: ()/(63-73) 96/73     Physical Exam:  Constitutional: No acute distress, awake, alert up in chair, chronically ill appearing   HENT: NCAT, mucous membranes moist  Respiratory: Clear to auscultation bilaterally, respiratory effort normal   Cardiovascular: RRR/ paced, no murmurs, rubs, or gallops  Gastrointestinal: Positive bowel sounds, soft, nontender, nondistended  Musculoskeletal: No bilateral ankle edema  Psychiatric: Flat affect, cooperative  Neurologic: Oriented x 3, ALBARADO, speech clear  Skin: RLE covered with bandage, fluid filled blister R great toe    Results Reviewed:  LAB RESULTS:      Lab 24  0412 24  0548 24  0550 24  1506   WBC 6.71 8.82 11.83* 10.31   HEMOGLOBIN 8.9* 9.4* 9.8* 10.1*   HEMATOCRIT 26.9* 28.8* 31.3* 33.5*   PLATELETS 113* 104* 93* 108*   NEUTROS ABS  --   --  10.51* 8.74*   IMMATURE GRANS (ABS)  --   --  0.09* 0.05   LYMPHS ABS  --   --  0.37* 0.38*   MONOS ABS  --   --  0.83 0.95*   EOS ABS  --   --  0.01 0.16   MCV 92.1 92.3 95.4 99.4*         Lab 24  0412 24  0548 24  0550 24  1713 24  1506   SODIUM 135* 134* 138  --  137   POTASSIUM 4.9 4.8 4.9  --  5.3*   CHLORIDE 106 105 107  --  108*   CO2 17.0* 17.0* 14.0*  --  17.0*   ANION GAP 12.0 12.0 17.0*  --  12.0   BUN 83* 80* 77*  --  77*   CREATININE 1.99* 2.06* 1.85*  --  1.70*   EGFR 33.5* 32.2* 36.6*  --  40.5*   GLUCOSE 113* 74 278*  --   264*   CALCIUM 8.3* 8.2* 8.7  --  8.8   MAGNESIUM  --   --   --   --  2.2   HEMOGLOBIN A1C  --   --  8.50*  --   --    TSH  --   --   --  1.940  --          Lab 04/22/24  1506   TOTAL PROTEIN 6.4   ALBUMIN 3.7   GLOBULIN 2.7   ALT (SGPT) 26   AST (SGOT) 18   BILIRUBIN 0.4   ALK PHOS 80         Lab 04/22/24  1713 04/22/24  1506   HSTROP T 119* 123*             Lab 04/22/24  1836 04/22/24  1506   IRON  --  22* 22*   IRON SATURATION (TSAT)  --  7*   TIBC  --  325   TRANSFERRIN  --  218   FERRITIN  --  309.80   FOLATE 11.40  --    VITAMIN B 12 704  --          Brief Urine Lab Results  (Last result in the past 365 days)        Color   Clarity   Blood   Leuk Est   Nitrite   Protein   CREAT   Urine HCG        04/24/24 1756 Yellow   Clear   Moderate (2+)   Small (1+)   Negative   30 mg/dL (1+)                   Microbiology Results Abnormal       Procedure Component Value - Date/Time    Urine Culture - Urine, Straight Cath [[id number]]  (Normal) Collected: 04/24/24 1756    Lab Status: Preliminary result Specimen: Urine from Straight Cath Updated: 04/25/24 1044     Urine Culture No growth    Urine Culture - Urine, Straight Cath [[id number]]  (Normal) Collected: 04/22/24 1554    Lab Status: Final result Specimen: Urine from Straight Cath Updated: 04/23/24 2147     Urine Culture No growth            No radiology results from the last 24 hrs    Results for orders placed during the hospital encounter of 07/27/22    Adult Transthoracic Echo Complete W/ Cont if Necessary Per Protocol    Interpretation Summary  · Left ventricular ejection fraction appears to be 46 - 50%. Left ventricular systolic function is low normal.  · Left ventricular septal hypokinesis  · No significant valvular heart disease      Current medications:  Scheduled Meds:apixaban, 5 mg, Oral, Q12H  aspirin, 81 mg, Oral, Daily  atorvastatin, 20 mg, Oral, Nightly  azelastine, 2 spray, Each Nare, BID  [Held by provider] bumetanide, 1 mg, Oral, BID  [Held by  provider] carvedilol, 3.125 mg, Oral, BID With Meals  insulin glargine, 8 Units, Subcutaneous, Nightly  insulin lispro, 2-9 Units, Subcutaneous, 4x Daily AC & at Bedtime  [Held by provider] isosorbide mononitrate, 30 mg, Oral, Daily  lactobacillus acidophilus, 1 capsule, Oral, Daily  levothyroxine, 88 mcg, Oral, Q AM  [Held by provider] losartan, 50 mg, Oral, Daily  pantoprazole, 40 mg, Oral, Q AM  polyethylene glycol, 17 g, Oral, Daily  sodium chloride, 10 mL, Intravenous, Q12H  tamsulosin, 0.4 mg, Oral, Daily      Continuous Infusions:   PRN Meds:.  acetaminophen    senna-docusate sodium **AND** polyethylene glycol **AND** bisacodyl **AND** bisacodyl    dextrose    dextrose    famotidine    glucagon (human recombinant)    Magnesium Low Dose Replacement - Follow Nurse / BPA Driven Protocol    nitroglycerin    prochlorperazine    sodium chloride    sodium chloride    sodium chloride    Assessment & Plan   Assessment & Plan     Active Hospital Problems    Diagnosis  POA    **UTI (urinary tract infection) [N39.0]  Yes    PAF (paroxysmal atrial fibrillation) [I48.0]  Yes    Anemia, chronic disease [D63.8]  Yes    Stage 3b chronic kidney disease [N18.32]  Yes    Chronic HFrEF (heart failure with reduced ejection fraction) [I50.22]  Yes    Type 2 diabetes mellitus [E11.9]  Yes    Hypertension [I10]  Yes    Hyperlipidemia [E78.5]  Yes    Hypothyroidism (acquired) [E03.9]  Yes    RBBB [I45.10]  Yes    S/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 Au [Z95.1]  Not Applicable      Resolved Hospital Problems   No resolved problems to display.        Brief Hospital Course to date:  Jermaine Singh is a 79 y.o. male with a history of CAD, DM, HTN, HLD, PAF, PVD, RBBB, Asthma, weakness, Hypothyroidism, CKD, presents to the ED with complaints of generalized weakness.      This patient's problems and plans were partially entered by my partner and updated as appropriate by me 04/25/24.      Assessment and Plan:     Acute UTI (POA)  --UA:  Blood moderate, leukocytes small, protein 30, RBC 11-20, WBC 21-50, bacteria 2+  -Urine Cx with no growth. Unfortunately micro tech discarded plate so not further worked up for yeast per discussion with ID  -Plan to re-check another UA and send for fungal Cx.   -Dysuria improved today  -Patient had UA at PCP 3/5/24 took ~ 4 days of Bactrim. Was prescribed course of Fluconazole as Cx + yeast which he completed. Also had UA recently at Dr Soto's office, no growth. Continues to have dysuria.  All prior Cx in our system + yeast but none were further speciated.   -DC Rocephin per discussion with ID  --cont to monitor newest urine culture      Urinary retention  BPH  Bladder wall thickening--cystoscopy on 3/22/2024 showed scattered areas of flat erythema most notable on the right lateral wall.  3-4+ trabeculation.  -- Acute urinary retention protocol, bladder scan q shift   -- Flomax  -- Follows with Dr. Soto with urology, consulted as patient supposed to follow-up with him in office  --Dr Soto seen in consult today, will plan for outpatient follow up after rehab.      Generalized weakness  -- Fall precautions  -- PT/OT, PT recommends IPR. Pt/wife agreeable. CM following     Chronic cellulitis RLE  Blister right foot  --X-ray right foot shows large amount of soft tissue swelling with no definite subcutaneous emphysema or destructive bone lesion  -- WOC has seen  -- ID following, appears to be more of a dermatitis  -- PT wound following     T2DM  -- A1c 8.5  -- Continue MDSSI  -- DC scheduled Humalog  -- Continue Lantus, decrease to 8 units nightly      Elevated troponin--chronically elevated (chronically elevate trop >100)  CAD s/p CABG  Parox afib/SSS/pacemaker  HTN  HLD  Severe PVD, Hx multiple toe amputations R foot   RBBB  Chronic HFrEF (EF 42% previous stress test)  -- Strict I's and O's  -- Daily weights  -- Aspirin, atorvastatin,   -- Hold Coreg, Imdur, losartan, Bumex due to low BP  -- Will start midodrine today with  BP remaining borderline, will need to restart some home heart failure medications as able      CKD 3 (baseline cr ~1.6-2.1)  Hyperkalemia -> resolved   -Cr at baseline  -Potassium normalized, pt refused Lokelma      Asthma  -currently stable, not in exacerbation  -Chest x-ray shows no active pulmonary process     Anemia  Iron def  -On IV iron (3/3)  -H&H stable    TCP  -new from prior admission  -slightly improved today, monitor     Hypothyroidism  --TSH 1.94; Continue levothyroxine    Expected Discharge Location and Transportation: Rehab soon   Expected Discharge   Expected Discharge Date: 4/26/2024; Expected Discharge Time:      DVT prophylaxis:  Medical DVT prophylaxis orders are present.         AM-PAC 6 Clicks Score (PT): 18 (04/25/24 0845)    CODE STATUS:   Code Status and Medical Interventions:   Ordered at: 04/22/24 2024     Level Of Support Discussed With:    Patient     Code Status (Patient has no pulse and is not breathing):    CPR (Attempt to Resuscitate)     Medical Interventions (Patient has pulse or is breathing):    Full Support       Lela Giordano, DAVID  04/25/24

## 2024-04-25 NOTE — PROGRESS NOTES
Intensive Care Follow-up     Hospital:  LOS: 2 days   Mr. Jermaine Singh, 79 y.o. 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  He had been convalescing however today he experienced cardiac arrest with ventricular tachycardia and recurrent ventricular fibrillation requiring resuscitation.  He underwent ACLS protocols including medication infusion with amiodarone and lidocaine as well as multiple defibrillations (greater than 10).  Eventually he did stabilize and has been moving purposefully.  He did require intubation during this.  He has been brought to the intensive care unit on an amiodarone drip for further evaluation.    Upon arrival, the patient is hemodynamically stable however does have a wide-complex with him currently.  Review of his admission EKG shows a paced ventricular rhythm.  Currently on telemetry he is having intermittent pacing as well as some ectopic beats.    Review of the chart shows that he had been having his beta-blocker and other antihypertensive secondary to hypotension.  He was started on midodrine during this visit.    Repeat EKG and labs are currently pending.    The patient's past medical, surgical and social history were reviewed and updated in Epic as appropriate.        Objective     Infusions:  amiodarone, 1 mg/min, Last Rate: 1 mg/min (04/25/24 1439)   Followed by  amiodarone, 0.5 mg/min  propofol, 5-50 mcg/kg/min, Last Rate: 25 mcg/kg/min (04/25/24 1434)      Medications:  apixaban, 5 mg, Oral, Q12H  aspirin, 81 mg, Oral, Daily  atorvastatin, 20 mg, Oral, Nightly  azelastine, 2 spray, Each  "Nare, BID  [Held by provider] bumetanide, 1 mg, Oral, BID  [Held by provider] carvedilol, 3.125 mg, Oral, BID With Meals  insulin glargine, 8 Units, Subcutaneous, Nightly  insulin lispro, 2-9 Units, Subcutaneous, 4x Daily AC & at Bedtime  [Held by provider] isosorbide mononitrate, 30 mg, Oral, Daily  lactobacillus acidophilus, 1 capsule, Oral, Daily  levothyroxine, 88 mcg, Oral, Q AM  [Held by provider] losartan, 50 mg, Oral, Daily  midodrine, 5 mg, Oral, TID AC  pantoprazole, 40 mg, Oral, Q AM  polyethylene glycol, 17 g, Oral, Daily  sodium chloride, 10 mL, Intravenous, Q12H  tamsulosin, 0.4 mg, Oral, Daily        Vital Sign Min/Max for last 24 hours  Temp  Min: 98.1 °F (36.7 °C)  Max: 98.3 °F (36.8 °C)   BP  Min: 94/68  Max: 109/75   Pulse  Min: 63  Max: 86   Resp  Min: 16  Max: 28   SpO2  Min: 93 %  Max: 98 %   No data recorded       Input/Output for last 24 hour shift  04/24 0701 - 04/25 0700  In: -   Out: 875 [Urine:875]   Mode: VC+/AC  FiO2 (%):  [100 %] 100 %  S RR:  [16] 16  S VT:  [420 mL] 420 mL  PEEP/CPAP (cm H2O):  [5 cm H20] 5 cm H20  MAP (cm H2O):  [8.1] 8.1  Objective:  General Appearance:  Uncomfortable and ill-appearing (Very pale, ill-appearing man who is currently intubated.).    Vital signs: (most recent): Blood pressure 109/75, pulse 75, temperature 98.3 °F (36.8 °C), temperature source Oral, resp. rate 28, height 190.5 cm (75\"), weight 77.7 kg (171 lb 4.8 oz), SpO2 97%.    HEENT: (Endotracheal tube in place.)    Lungs:  Normal effort and normal respiratory rate.  Breath sounds clear to auscultation.  He is not in respiratory distress.  No decreased breath sounds.    Heart: Normal rate.  Regular rhythm.  S1 normal and S2 normal.  No murmur. (Pacer over left precordium)  Abdomen: Abdomen is soft and non-distended.  There is no mass.   Extremities: Normal range of motion.  (Mild right lower extremity edema compared to the left.  Right lower extremity is Ace wrapped.  Chronic vascular disease " noted.)  Neurological: (Patient is arousable.  Follows minimal commands.).    Pupils:  Pupils are equal, round, and reactive to light.  Pupils are equal. (Very pale conjunctiva).    Skin:  Cool, diaphoretic and pale.  No cyanosis.               Results from last 7 days   Lab Units 04/25/24  0412 04/24/24  0548 04/23/24  0550   WBC 10*3/mm3 6.71 8.82 11.83*   HEMOGLOBIN g/dL 8.9* 9.4* 9.8*   PLATELETS 10*3/mm3 113* 104* 93*     Results from last 7 days   Lab Units 04/25/24  0412 04/24/24  0548 04/23/24  0550 04/22/24  1506   SODIUM mmol/L 135* 134* 138 137   POTASSIUM mmol/L 4.9 4.8 4.9 5.3*   CO2 mmol/L 17.0* 17.0* 14.0* 17.0*   BUN mg/dL 83* 80* 77* 77*   CREATININE mg/dL 1.99* 2.06* 1.85* 1.70*   MAGNESIUM mg/dL  --   --   --  2.2   GLUCOSE mg/dL 113* 74 278* 264*     Estimated Creatinine Clearance: 33.1 mL/min (A) (by C-G formula based on SCr of 1.99 mg/dL (H)).          Images:   Chest x-ray currently pending    I reviewed the patient's results and images.     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 Au    Chronic HFrEF (heart failure with reduced ejection fraction)    Anemia, chronic disease    Stage 3b chronic kidney disease    PAF (paroxysmal atrial fibrillation)    Cardiac arrest    Ventricular arrhythmia    Acute respiratory failure with hypoxia       Plan        Monitor in the intensive care unit.  Repeat EKG now.  Stat echocardiogram.  Amiodarone drip for now.  Repeat all labs and we will correct electrolyte abnormalities as necessary.  Check chest x-ray we will adjust ventilator as necessary.  Continue anticoagulation.  Stat cardiology evaluation.  At this point it is unclear what triggered his arrhythmias and he does remain critically ill and at imminent risk of death secondary to cardiac arrhythmia and respiratory failure.  Orders and  assessment to follow when information is available.    Plan of care and goals reviewed with mulitdisciplinary/antibiotic stewardship team during rounds.   I discussed the patient's findings and my recommendations with nursing staff     High level of risk due to:  drug(s) requiring intensive monitoring for toxicity, parenteral controlled substances, and decision regarding escalation of level of hospital care.    Time spent Critical care 32 min (exclusive of procedure time)  including high complexity decision making to assess, manipulate, and support vital organ system failure in this individual who has impairment of one or more vital organ systems such that there is a high probability of imminent or life threatening deterioration in the patient’s condition.      Eyad Ledesma MD, [facility]  Pulmonology and Critical Care Medicine

## 2024-04-25 NOTE — NURSING NOTE
Patient had 7 beat run of Vtach approx. 1325, assessed patient and he denied any symptoms, (also having more frequent PVC's). Dr. Velazquez notified and orders for stat EKG. At approx. 1330 patient went into V-tach, unresponsive, no pulse, code blue called and CPR started. Wife had just left and she was called at 1335 about change in status ( wife returned and at bedside). Patient transferred to ICU.

## 2024-04-26 ENCOUNTER — APPOINTMENT (OUTPATIENT)
Dept: NEUROLOGY | Facility: HOSPITAL | Age: 79
End: 2024-04-26
Payer: MEDICARE

## 2024-04-26 ENCOUNTER — APPOINTMENT (OUTPATIENT)
Dept: GENERAL RADIOLOGY | Facility: HOSPITAL | Age: 79
End: 2024-04-26
Payer: MEDICARE

## 2024-04-26 LAB
ALBUMIN SERPL-MCNC: 3.1 G/DL (ref 3.5–5.2)
ALBUMIN/GLOB SERPL: 1.3 G/DL
ALP SERPL-CCNC: 130 U/L (ref 39–117)
ALT SERPL W P-5'-P-CCNC: 1028 U/L (ref 1–41)
ANION GAP SERPL CALCULATED.3IONS-SCNC: 15 MMOL/L (ref 5–15)
ARTERIAL PATENCY WRIST A: ABNORMAL
AST SERPL-CCNC: 787 U/L (ref 1–40)
ATMOSPHERIC PRESS: ABNORMAL MM[HG]
BACTERIA SPEC AEROBE CULT: NO GROWTH
BASE EXCESS BLDA CALC-SCNC: -6.5 MMOL/L (ref 0–2)
BASOPHILS # BLD AUTO: 0.02 10*3/MM3 (ref 0–0.2)
BASOPHILS NFR BLD AUTO: 0.2 % (ref 0–1.5)
BDY SITE: ABNORMAL
BILIRUB SERPL-MCNC: 0.6 MG/DL (ref 0–1.2)
BODY TEMPERATURE: 37
BUN SERPL-MCNC: 78 MG/DL (ref 8–23)
BUN/CREAT SERPL: 34.2 (ref 7–25)
CA-I SERPL ISE-MCNC: 1.21 MMOL/L (ref 1.12–1.32)
CALCIUM SPEC-SCNC: 7.9 MG/DL (ref 8.6–10.5)
CHLORIDE SERPL-SCNC: 106 MMOL/L (ref 98–107)
CO2 BLDA-SCNC: 17.5 MMOL/L (ref 22–33)
CO2 SERPL-SCNC: 16 MMOL/L (ref 22–29)
COHGB MFR BLD: 1.1 % (ref 0–2)
CREAT SERPL-MCNC: 2.28 MG/DL (ref 0.76–1.27)
DEPRECATED RDW RBC AUTO: 42.5 FL (ref 37–54)
EGFRCR SERPLBLD CKD-EPI 2021: 28.5 ML/MIN/1.73
EOSINOPHIL # BLD AUTO: 0.02 10*3/MM3 (ref 0–0.4)
EOSINOPHIL NFR BLD AUTO: 0.2 % (ref 0.3–6.2)
EPAP: 0
ERYTHROCYTE [DISTWIDTH] IN BLOOD BY AUTOMATED COUNT: 13.1 % (ref 12.3–15.4)
GLOBULIN UR ELPH-MCNC: 2.3 GM/DL
GLUCOSE BLDC GLUCOMTR-MCNC: 122 MG/DL (ref 70–130)
GLUCOSE BLDC GLUCOMTR-MCNC: 142 MG/DL (ref 70–130)
GLUCOSE BLDC GLUCOMTR-MCNC: 152 MG/DL (ref 70–130)
GLUCOSE BLDC GLUCOMTR-MCNC: 161 MG/DL (ref 70–130)
GLUCOSE SERPL-MCNC: 188 MG/DL (ref 65–99)
HCO3 BLDA-SCNC: 16.7 MMOL/L (ref 20–26)
HCT VFR BLD AUTO: 23.7 % (ref 37.5–51)
HCT VFR BLD AUTO: 24.3 % (ref 37.5–51)
HCT VFR BLD CALC: 25.3 % (ref 38–51)
HGB BLD-MCNC: 7.9 G/DL (ref 13–17.7)
HGB BLD-MCNC: 8.2 G/DL (ref 13–17.7)
HGB BLDA-MCNC: 8.3 G/DL (ref 13.5–17.5)
IMM GRANULOCYTES # BLD AUTO: 0.05 10*3/MM3 (ref 0–0.05)
IMM GRANULOCYTES NFR BLD AUTO: 0.5 % (ref 0–0.5)
INHALED O2 CONCENTRATION: 30 %
IPAP: 0
LYMPHOCYTES # BLD AUTO: 0.66 10*3/MM3 (ref 0.7–3.1)
LYMPHOCYTES NFR BLD AUTO: 6.6 % (ref 19.6–45.3)
MAGNESIUM SERPL-MCNC: 2.9 MG/DL (ref 1.6–2.4)
MCH RBC QN AUTO: 29.6 PG (ref 26.6–33)
MCHC RBC AUTO-ENTMCNC: 33.3 G/DL (ref 31.5–35.7)
MCV RBC AUTO: 88.8 FL (ref 79–97)
METHGB BLD QL: 0.9 % (ref 0–1.5)
MODALITY: ABNORMAL
MONOCYTES # BLD AUTO: 0.89 10*3/MM3 (ref 0.1–0.9)
MONOCYTES NFR BLD AUTO: 8.9 % (ref 5–12)
NEUTROPHILS NFR BLD AUTO: 8.31 10*3/MM3 (ref 1.7–7)
NEUTROPHILS NFR BLD AUTO: 83.6 % (ref 42.7–76)
NRBC BLD AUTO-RTO: 0 /100 WBC (ref 0–0.2)
OXYHGB MFR BLDV: 97.2 % (ref 94–99)
PAW @ PEAK INSP FLOW SETTING VENT: 0 CMH2O
PCO2 BLDA: 24.6 MM HG (ref 35–45)
PCO2 TEMP ADJ BLD: 24.6 MM HG (ref 35–48)
PH BLDA: 7.44 PH UNITS (ref 7.35–7.45)
PH, TEMP CORRECTED: 7.44 PH UNITS
PLATELET # BLD AUTO: 158 10*3/MM3 (ref 140–450)
PMV BLD AUTO: 11.9 FL (ref 6–12)
PO2 BLDA: 118 MM HG (ref 83–108)
PO2 TEMP ADJ BLD: 118 MM HG (ref 83–108)
POTASSIUM SERPL-SCNC: 4.8 MMOL/L (ref 3.5–5.2)
PROT SERPL-MCNC: 5.4 G/DL (ref 6–8.5)
QT INTERVAL: 518 MS
QTC INTERVAL: 590 MS
RBC # BLD AUTO: 2.67 10*6/MM3 (ref 4.14–5.8)
SODIUM SERPL-SCNC: 137 MMOL/L (ref 136–145)
TOTAL RATE: 0 BREATHS/MINUTE
WBC NRBC COR # BLD AUTO: 9.95 10*3/MM3 (ref 3.4–10.8)

## 2024-04-26 PROCEDURE — 95816 EEG AWAKE AND DROWSY: CPT | Performed by: PSYCHIATRY & NEUROLOGY

## 2024-04-26 PROCEDURE — 25010000002 FENTANYL 10 MCG/1 ML NS

## 2024-04-26 PROCEDURE — 94799 UNLISTED PULMONARY SVC/PX: CPT

## 2024-04-26 PROCEDURE — 63710000001 INSULIN REGULAR HUMAN PER 5 UNITS

## 2024-04-26 PROCEDURE — 83735 ASSAY OF MAGNESIUM: CPT | Performed by: INTERNAL MEDICINE

## 2024-04-26 PROCEDURE — 25010000002 PIPERACILLIN SOD-TAZOBACTAM PER 1 G: Performed by: INTERNAL MEDICINE

## 2024-04-26 PROCEDURE — 85018 HEMOGLOBIN: CPT | Performed by: INTERNAL MEDICINE

## 2024-04-26 PROCEDURE — 95816 EEG AWAKE AND DROWSY: CPT

## 2024-04-26 PROCEDURE — 99291 CRITICAL CARE FIRST HOUR: CPT | Performed by: INTERNAL MEDICINE

## 2024-04-26 PROCEDURE — 71045 X-RAY EXAM CHEST 1 VIEW: CPT

## 2024-04-26 PROCEDURE — 82948 REAGENT STRIP/BLOOD GLUCOSE: CPT

## 2024-04-26 PROCEDURE — 82330 ASSAY OF CALCIUM: CPT | Performed by: INTERNAL MEDICINE

## 2024-04-26 PROCEDURE — 63710000001 INSULIN GLARGINE PER 5 UNITS: Performed by: FAMILY MEDICINE

## 2024-04-26 PROCEDURE — 83050 HGB METHEMOGLOBIN QUAN: CPT

## 2024-04-26 PROCEDURE — 85025 COMPLETE CBC W/AUTO DIFF WBC: CPT | Performed by: INTERNAL MEDICINE

## 2024-04-26 PROCEDURE — 29580 STRAPPING UNNA BOOT: CPT

## 2024-04-26 PROCEDURE — 93010 ELECTROCARDIOGRAM REPORT: CPT | Performed by: INTERNAL MEDICINE

## 2024-04-26 PROCEDURE — 94660 CPAP INITIATION&MGMT: CPT

## 2024-04-26 PROCEDURE — 94761 N-INVAS EAR/PLS OXIMETRY MLT: CPT

## 2024-04-26 PROCEDURE — 25010000002 AMIODARONE IN DEXTROSE 5% 360-4.14 MG/200ML-% SOLUTION: Performed by: NURSE PRACTITIONER

## 2024-04-26 PROCEDURE — 25010000002 MORPHINE PER 10 MG: Performed by: INTERNAL MEDICINE

## 2024-04-26 PROCEDURE — 82375 ASSAY CARBOXYHB QUANT: CPT

## 2024-04-26 PROCEDURE — 93005 ELECTROCARDIOGRAM TRACING: CPT | Performed by: INTERNAL MEDICINE

## 2024-04-26 PROCEDURE — 85014 HEMATOCRIT: CPT | Performed by: INTERNAL MEDICINE

## 2024-04-26 PROCEDURE — 80053 COMPREHEN METABOLIC PANEL: CPT | Performed by: INTERNAL MEDICINE

## 2024-04-26 PROCEDURE — 97597 DBRDMT OPN WND 1ST 20 CM/<: CPT

## 2024-04-26 PROCEDURE — 94003 VENT MGMT INPAT SUBQ DAY: CPT

## 2024-04-26 PROCEDURE — 25010000002 PROCHLORPERAZINE 10 MG/2ML SOLUTION: Performed by: NURSE PRACTITIONER

## 2024-04-26 PROCEDURE — 25010000002 AMIODARONE IN DEXTROSE 5% 360-4.14 MG/200ML-% SOLUTION: Performed by: INTERNAL MEDICINE

## 2024-04-26 PROCEDURE — 82805 BLOOD GASES W/O2 SATURATION: CPT

## 2024-04-26 RX ORDER — MORPHINE SULFATE 2 MG/ML
1 INJECTION, SOLUTION INTRAMUSCULAR; INTRAVENOUS
Status: DISCONTINUED | OUTPATIENT
Start: 2024-04-26 | End: 2024-04-28

## 2024-04-26 RX ORDER — MIDAZOLAM HYDROCHLORIDE 1 MG/ML
0.5 INJECTION INTRAMUSCULAR; INTRAVENOUS EVERY 4 HOURS PRN
Status: DISCONTINUED | OUTPATIENT
Start: 2024-04-26 | End: 2024-04-29

## 2024-04-26 RX ADMIN — PIPERACILLIN AND TAZOBACTAM 3.38 G: 3; .375 INJECTION, POWDER, LYOPHILIZED, FOR SOLUTION INTRAVENOUS at 15:27

## 2024-04-26 RX ADMIN — AMIODARONE HYDROCHLORIDE 0.5 MG/MIN: 1.8 INJECTION, SOLUTION INTRAVENOUS at 18:13

## 2024-04-26 RX ADMIN — MIDODRINE HYDROCHLORIDE 5 MG: 5 TABLET ORAL at 12:14

## 2024-04-26 RX ADMIN — AMIODARONE HYDROCHLORIDE 0.5 MG/MIN: 1.8 INJECTION, SOLUTION INTRAVENOUS at 08:25

## 2024-04-26 RX ADMIN — Medication 300 MCG/HR: at 06:57

## 2024-04-26 RX ADMIN — INSULIN GLARGINE 8 UNITS: 100 INJECTION, SOLUTION SUBCUTANEOUS at 20:56

## 2024-04-26 RX ADMIN — PANTOPRAZOLE SODIUM 40 MG: 40 INJECTION, POWDER, FOR SOLUTION INTRAVENOUS at 06:03

## 2024-04-26 RX ADMIN — Medication 10 ML: at 10:14

## 2024-04-26 RX ADMIN — INSULIN HUMAN 2 UNITS: 100 INJECTION, SOLUTION PARENTERAL at 06:02

## 2024-04-26 RX ADMIN — PIPERACILLIN AND TAZOBACTAM 3.38 G: 3; .375 INJECTION, POWDER, LYOPHILIZED, FOR SOLUTION INTRAVENOUS at 21:00

## 2024-04-26 RX ADMIN — PROCHLORPERAZINE EDISYLATE 2.5 MG: 5 INJECTION INTRAMUSCULAR; INTRAVENOUS at 13:16

## 2024-04-26 RX ADMIN — MORPHINE SULFATE 1 MG: 2 INJECTION, SOLUTION INTRAMUSCULAR; INTRAVENOUS at 14:13

## 2024-04-26 RX ADMIN — 0.12% CHLORHEXIDINE GLUCONATE 15 ML: 1.2 RINSE ORAL at 08:20

## 2024-04-26 RX ADMIN — MORPHINE SULFATE 1 MG: 2 INJECTION, SOLUTION INTRAMUSCULAR; INTRAVENOUS at 21:46

## 2024-04-26 RX ADMIN — MORPHINE SULFATE 1 MG: 2 INJECTION, SOLUTION INTRAMUSCULAR; INTRAVENOUS at 18:08

## 2024-04-26 RX ADMIN — Medication 10 ML: at 20:56

## 2024-04-26 NOTE — PROGRESS NOTES
Intensive Care Follow-up     Hospital:  LOS: 3 days   Mr. Jermaine Singh, 79 y.o. 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  He had been convalescing however today he experienced cardiac arrest with ventricular tachycardia and recurrent ventricular fibrillation requiring resuscitation.  He underwent ACLS protocols including medication infusion with amiodarone and lidocaine as well as multiple defibrillations (greater than 10).  Eventually he did stabilize and has been moving purposefully.  He did require intubation during this.  He has been brought to the intensive care unit on an amiodarone drip for further evaluation.     Interval History:  Chart has been reviewed.  The patient has been weaned off epinephrine.  He does remain on amiodarone and norepinephrine.  Currently on minimal ventilatory settings.  He is sedated but arouses easily.  We were able to place him in pressure support and he was able to tolerate this without any sign of apnea.    The patient's past medical, surgical and social history were reviewed and updated in Epic as appropriate.        Objective     Infusions:  amiodarone, 0.5 mg/min, Last Rate: 0.5 mg/min (04/26/24 0825)  EPINEPHrine, 0.02-0.3 mcg/kg/min, Last Rate: Stopped (04/25/24 1925)  fentanyl 10 mcg/mL,  mcg/hr, Last Rate: Stopped (04/26/24 1056)  midazolam, 1-10 mg/hr, Last Rate: Stopped (04/25/24 2051)  norepinephrine, 0.02-0.3 mcg/kg/min, Last Rate: 0.04 mcg/kg/min (04/26/24 1029)  phenylephrine, 0.5-3 mcg/kg/min, Last Rate: Stopped (04/25/24 1547)      Medications:  [Held by provider] apixaban, 5 mg, Oral,  "Q12H  aspirin, 81 mg, Oral, Daily  [Held by provider] atorvastatin, 20 mg, Oral, Nightly  azelastine, 2 spray, Each Nare, BID  [Held by provider] carvedilol, 3.125 mg, Oral, BID With Meals  chlorhexidine, 15 mL, Mouth/Throat, Q12H  insulin glargine, 8 Units, Subcutaneous, Nightly  insulin regular, 2-9 Units, Subcutaneous, Q6H  [Held by provider] isosorbide mononitrate, 30 mg, Oral, Daily  lactobacillus acidophilus, 1 capsule, Oral, Daily  levothyroxine, 88 mcg, Oral, Q AM  [Held by provider] losartan, 50 mg, Oral, Daily  midodrine, 5 mg, Oral, TID AC  pantoprazole, 40 mg, Intravenous, Q AM  polyethylene glycol, 17 g, Oral, Daily  sodium chloride, 10 mL, Intravenous, Q12H  tamsulosin, 0.4 mg, Oral, Daily        Vital Sign Min/Max for last 24 hours  Temp  Min: 96.1 °F (35.6 °C)  Max: 99.5 °F (37.5 °C)   BP  Min: 58/43  Max: 153/108   Pulse  Min: 59  Max: 92   Resp  Min: 16  Max: 28   SpO2  Min: 86 %  Max: 100 %   No data recorded       Input/Output for last 24 hour shift  04/25 0701 - 04/26 0700  In: 1085 [I.V.:1075]  Out: 550 [Urine:550]   Mode: VC+/AC  FiO2 (%):  [30 %-100 %] 30 %  S RR:  [16] 16  S VT:  [420 mL-590 mL] 590 mL  PEEP/CPAP (cm H2O):  [5 cm H20] 5 cm H20  MAP (cm H2O):  [8.1-9.6] 8.5  Objective:  General Appearance:  Ill-appearing and in no acute distress (Intubated, sedated but arousable).    Vital signs: (most recent): Blood pressure (!) 85/61, pulse 78, temperature 99 °F (37.2 °C), temperature source Bladder, resp. rate 16, height 190 cm (74.8\"), weight 81.5 kg (179 lb 10.8 oz), SpO2 100%.    HEENT: (Endotracheal tube in place)    Lungs:  Normal effort and normal respiratory rate.  He is not in respiratory distress.  There are rales.  No decreased breath sounds.    Heart: Normal rate.  Regular rhythm.  S1 normal and S2 normal.  No murmur. (Pacer over left precordium)  Abdomen: Abdomen is soft and non-distended.  Bowel sounds are normal.   There is no mass.   Extremities: Normal range of motion.  " (Left femoral triple-lumen central venous catheter  Right radial arterial line)  Pulses: Distal pulses are intact.    Neurological: (Patient is arousable.  Follows commands).    Pupils:  Pupils are equal, round, and reactive to light.  Pupils are equal.   Skin:  Warm and pale.  No cyanosis.               Results from last 7 days   Lab Units 04/26/24  0410 04/25/24  1441 04/25/24  0412   WBC 10*3/mm3 9.95 9.14 6.71   HEMOGLOBIN g/dL 7.9* 9.6* 8.9*   PLATELETS 10*3/mm3 158 169 113*     Results from last 7 days   Lab Units 04/26/24  0410 04/25/24  1441 04/25/24  0412 04/23/24  0550 04/22/24  1506   SODIUM mmol/L 137 140 135*   < > 137   POTASSIUM mmol/L 4.8 4.5 4.9   < > 5.3*   CO2 mmol/L 16.0* 13.0* 17.0*   < > 17.0*   BUN mg/dL 78* 77* 83*   < > 77*   CREATININE mg/dL 2.28* 2.07* 1.99*   < > 1.70*   MAGNESIUM mg/dL 2.9* 4.6*  --   --  2.2   GLUCOSE mg/dL 188* 293* 113*   < > 264*    < > = values in this interval not displayed.     Estimated Creatinine Clearance: 30.3 mL/min (A) (by C-G formula based on SCr of 2.28 mg/dL (H)).    Results from last 7 days   Lab Units 04/26/24  0313   PH, ARTERIAL pH units 7.441   PCO2, ARTERIAL mm Hg 24.6*   PO2 ART mm Hg 118.0*         I reviewed the patient's results and images.     Assessment & Plan   Impression        Cardiac arrest    Type 2 diabetes mellitus with hyperglycemia    Hyperlipidemia LDL goal <70    Hypothyroidism (acquired)    Coronary artery disease involving native coronary artery of native heart with angina pectoris    Pacemaker    HFrEF (heart failure with reduced ejection fraction)    Anemia, chronic disease    Stage 3b chronic kidney disease    UTI (urinary tract infection)    Acute respiratory failure with hypoxia    Elevated troponin level not due myocardial infarction       Plan        I would like to give the patient a trial of extubation.  Continue to monitor creatinine closely.  Continue with amiodarone.  Wean norepinephrine as able.  Pacemaker  interrogation today.  Hemoglobin has dropped somewhat without any obvious sign of bleeding.  We will recheck later today.  No transfusion for now.  Mechanical DVT prophylaxis for now.  This patient remains critically ill and at imminent risk of death.    Plan of care and goals reviewed with mulitdisciplinary/antibiotic stewardship team during rounds.   I discussed the patient's findings and my recommendations with patient, family, and nursing staff     High level of risk due to:  drug(s) requiring intensive monitoring for toxicity, parenteral controlled substances, and decision to de-escalate care.    Time spent Critical care 34 min (exclusive of procedure time)  including high complexity decision making to assess, manipulate, and support vital organ system failure in this individual who has impairment of one or more vital organ systems such that there is a high probability of imminent or life threatening deterioration in the patient’s condition.      Eyad Ledesma MD, [facility]  Pulmonology and Critical Care Medicine

## 2024-04-26 NOTE — CASE MANAGEMENT/SOCIAL WORK
Continued Stay Note  [facility]     Patient Name: Jermaine Singh  MRN: [id number]  Today's Date: 4/26/2024    Admit Date: 4/22/2024    Plan: ongoing   Discharge Plan       Row Name 04/26/24 1407       Plan    Plan ongoing    Patient/Family in Agreement with Plan yes    Plan Comments Per previous CM note, plan was to d/c to [facility] tomorrow. I updated [facility], that patient had been transferred to ICU, transport cancelled as well. I updated spouse and daughter in room. Patient was extubated  today. See Dr. Ledesma note. Will continue to follow.    Final Discharge Disposition Code 30 - still a patient                   Discharge Codes    No documentation.                 Expected Discharge Date and Time       Expected Discharge Date Expected Discharge Time    May 2, 2024               Reilly Davis RN

## 2024-04-26 NOTE — PROGRESS NOTES
" [facility] - Progress Note    Jermaine JIMENES Francisco  1945  [id number]/1    04/26/24, 09:46 EDT      Chief Complaint: Following for cardiac arrest    Subjective:   Yesterday's events noted, notes reviewed.  Patient stable overnight, remains on vent.  Following commands and opening eyes.  Weaned off Versed and epi.  Remains on Levophed and fentanyl. Squeezing with left hand.    Wife and daughter at bedside.  Ectopy has improved, remains on Amio.          Review of Systems:  Pertinent positives are listed above and in physical exam.  All others have been reviewed and are negative.    [Held by provider] apixaban, 5 mg, Oral, Q12H  aspirin, 81 mg, Oral, Daily  [Held by provider] atorvastatin, 20 mg, Oral, Nightly  azelastine, 2 spray, Each Nare, BID  [Held by provider] carvedilol, 3.125 mg, Oral, BID With Meals  chlorhexidine, 15 mL, Mouth/Throat, Q12H  insulin glargine, 8 Units, Subcutaneous, Nightly  insulin regular, 2-9 Units, Subcutaneous, Q6H  [Held by provider] isosorbide mononitrate, 30 mg, Oral, Daily  lactobacillus acidophilus, 1 capsule, Oral, Daily  levothyroxine, 88 mcg, Oral, Q AM  [Held by provider] losartan, 50 mg, Oral, Daily  midodrine, 5 mg, Oral, TID AC  pantoprazole, 40 mg, Intravenous, Q AM  polyethylene glycol, 17 g, Oral, Daily  sodium chloride, 10 mL, Intravenous, Q12H  tamsulosin, 0.4 mg, Oral, Daily        Objective:  Vitals:   height is 190 cm (74.8\") and weight is 81.5 kg (179 lb 10.8 oz). His bladder temperature is 99 °F (37.2 °C). His blood pressure is 85/56 (abnormal) and his pulse is 64. His respiration is 16 and oxygen saturation is 100%.     Intake/Output Summary (Last 24 hours) at 4/26/2024 0946  Last data filed at 4/26/2024 0825  Gross per 24 hour   Intake 1300.9 ml   Output 650 ml   Net 650.9 ml       Physical Exam:  General:  WN, Will respond to command.  Agitated with ET tube at times. Appears stated age  CV:  Normal S1,S2. No murmur, rub, or gallop. + Pacemaker  Resp:  " "CTA Soy, equal, nonlabored. + vent  Abd:  Soft, + BS, no organomegaly. Nontender to palpation.  Extrem:  RLE with wrap, trace LLE.           Results from last 7 days   Lab Units 04/26/24  0410   WBC 10*3/mm3 9.95   HEMOGLOBIN g/dL 7.9*   HEMATOCRIT % 23.7*   PLATELETS 10*3/mm3 158     Results from last 7 days   Lab Units 04/26/24  0410   SODIUM mmol/L 137   POTASSIUM mmol/L 4.8   CHLORIDE mmol/L 106   CO2 mmol/L 16.0*   BUN mg/dL 78*   CREATININE mg/dL 2.28*   CALCIUM mg/dL 7.9*   BILIRUBIN mg/dL 0.6   ALK PHOS U/L 130*   ALT (SGPT) U/L 1,028*   AST (SGOT) U/L 787*   GLUCOSE mg/dL 188*         Results from last 7 days   Lab Units 04/22/24  1713   TSH uIU/mL 1.940               Tele: V paced      Assessment/Plan:  Cardiac arrest/VF arrest  Substantial substrate for VF arrest including ischemic heart disease with severely diffusely diseased LAD as well as newly identified severe LV dysfunction with EF <20%  Films reviewed by cards 4/25; \"Based on prebypass angiogram, I do not suspect readily \"intervenable\" lesions to correct ischemic substrate.  LAD is severely diffusely diseased and would not be suitable for percutaneous treatment.  Frankly, I am impressed that Dr. Au was able to bypass the LAD in 2019.\"  Recommend against emergent cardiac catheterization given his hemodynamic instability and unlikelihood of \"targets\" to correct ischemic substrate  Continue aspirin and statin  No beta-blocker due to hypotension, resume as pressor support weaned/BP tolerates  Continue amiodarone IV at 0.5 mg/min another 24 hours.  If recurrence of VT/VF, start lidocaine  Cardiology will continue to follow     Severe LV dysfunction with EF <20%  Newly diagnosed.  Last LVEF 48% in 2022  Unclear whether this is related to stunning from VF arrest or whether this has been pre-existing his admission.  I suspect the latter as patient has had dwindling functional capacity  Presently with some element of cardiogenic shock which prohibits " GDMT  Patient has dual chamber [employer] Isiah permanent pacemaker.  Will have device interrogated today.     Multifactorial shock  Cardiogenic, possibly septic  Continue norepinephrine his primary pressor, epinephrine is secondary pressor  Wean vasopressors to maintain MAP >65 mmHg     Elevated troponin, POA  Initial troponin measurements on admission elevated but flat and not consistent with myocardial infarction  If troponin is repeated, I suspect will be elevated but difficult to determine whether related to a type I MI or type II MI due to fixed CAD, CHF, and arrhythmia     Type 2 diabetes mellitus not on insulin with hyperglycemia  Uncontrolled (untreated?) diabetes prior to admission  Has only not candidate for SGL 2 inhibitor due to renal insufficiency  Start Glucomander protocol     Hyperlipidemia with goal LDL <70  LDL well-controlled  Must hold statin now as LFTs >3x ULN     Stage IIIb CKD  Hold losartan due to hypotension and anticipated [place] from cardiac event     History of DVT  Hold apixaban due to worsening renal insufficiency and possibly developing coagulopathy due to shock liver     Paroxysmal Atrial Fibrillation  Interrogate device, [employer] Isiah.  Hold NOAC with shock liver/worsening renal function.       I discussed the patient's findings and my recommendations with the patient, any present family members, and the nursing staff.  Jermaine Hernandez MD saw and examined patient, verified hx and PE, read all radiographic studies, reviewed labs and micro data, and formulated dx, plan for treatment and all medical decision making.      Megan Shukla PA-C  04/26/24, 09:46 EDT

## 2024-04-26 NOTE — PLAN OF CARE
Goal Outcome Evaluation:  Plan of Care Reviewed With: spouse        Progress: improving  Outcome Evaluation:   -PT remains on vent, PEEP 5, FiO2 30%  - opens eyes to voice, follows commands  -becomes restless upon waking but easily calms  -Able to wean versed and epi off  -Amio @0.5, Levo @0.04, Fentanyl @300  -475ml UOP this shift, sediment noted, 1 episode of bloody urine noted, irrigated without resistance

## 2024-04-26 NOTE — THERAPY WOUND CARE TREATMENT
Acute Care - Wound/Debridement Treatment Note  [facility]     Patient Name: Jermaine Singh  : 1945  MRN: [id number]  Today's Date: 2024                Admit Date: 2024    Visit Dx:    ICD-10-CM ICD-9-CM   1. Acute UTI  N39.0 599.0   2. Generalized weakness  R53.1 780.79   3. Edema of right lower extremity  R60.0 782.3   4. Leg erythema  L53.9 695.9   5. Chronic kidney disease, unspecified CKD stage  N18.9 585.9   6. Elevated troponin  R79.89 790.6   7. Hyperkalemia  E87.5 276.7   8. Cardiac arrest  I46.9 427.5       Patient Active Problem List   Diagnosis    Type 2 diabetes mellitus with hyperglycemia    Hyperlipidemia LDL goal <70    Hypothyroidism (acquired)    Vitamin D deficiency on Rx     Diabetic neuropathy    Coronary artery disease involving native coronary artery of native heart with angina pectoris    Atherosclerosis of native artery of both lower extremities with intermittent claudication    Pacemaker    HFrEF (heart failure with reduced ejection fraction)    DVT, bilateral lower limbs    Cellulitis of right lower extremity    Anemia, chronic disease    PVD (peripheral vascular disease)    Diabetic foot ulcer    GERD without esophagitis    Stage 3b chronic kidney disease    Right inguinal hernia    PAF (paroxysmal atrial fibrillation)    Moderate malnutrition    UTI (urinary tract infection)    Cardiac arrest    Acute respiratory failure with hypoxia    History of DVT (deep vein thrombosis)    Elevated troponin level not due myocardial infarction        Past Medical History:   Diagnosis Date    Allergic     Arthritis     Asthma     Coronary artery disease     Diabetes mellitus 2000    started on inuslin 2018; started on po meds in ; checking blood sugars daily     Diabetic foot infection 2023    Disease of thyroid gland     po meds daily for hypothyroidism     Elevated serum creatinine 11/15/2022    Elevated troponin 10/02/2022    Generalized weakness 11/15/2022     History of fracture as a child     rt leg- severe     Hyperlipidemia     Hypertension     Hypothyroidism     PAF (paroxysmal atrial fibrillation) 07/23/2023    Peripheral neuropathy     Peripheral vascular disease     s/p angiogram 2/19-needs stent in left leg     Pleural effusion, bilateral 11/15/2022    Proximal phalanx fracture of the second digit extending into the second metatarsal joint 07/28/2022    RBBB     Right knee pain     Status post amputation of great toe and second toe of left foot 07/23/2023    Status post amputation of great toe, left 11/15/2022    Unstable angina 02/12/2019    Added automatically from request for surgery [id number]    Vitamin D deficiency         Past Surgical History:   Procedure Laterality Date    AMPUTATION DIGIT Left 01/17/2023    Procedure: AMPUTATION DIGIT LEFT;  Surgeon: Gopal Márquez MD;  Location:  [facility] OR;  Service: Vascular;  Laterality: Left;    APPENDECTOMY      CARDIAC CATHETERIZATION N/A 02/14/2019    Procedure: Left Heart Cath;  Surgeon: Cooper Apodaca MD;  Location: [facility] CATH INVASIVE LOCATION;  Service: Cardiology    CARDIAC ELECTROPHYSIOLOGY PROCEDURE N/A 05/18/2022    Procedure: DEVICE IMPLANT;  Surgeon: Cooper Apodaca MD;  Location: [facility] CATH INVASIVE LOCATION;  Service: Cardiology;  Laterality: N/A;    CARDIAC SURGERY      COLONOSCOPY      CORONARY ARTERY BYPASS GRAFT N/A 03/22/2019    Procedure: MEDIAN STERNOTOMY, CORONARY ARTERY BYPASS GRAFT X3, UTILIZING THE LEFT INTERNAL MAMMARY ARTERY, EVH AND OPEN HARVEST OF THE RIGHT GREATER SAPHENOUS VEIN, EXPLORATION OF THE LEFT LEG;  Surgeon: Ap Au MD;  Location: [facility] OR;  Service: Cardiothoracic    EYE SURGERY Bilateral     cataracts     FRACTURE SURGERY      INTERVENTIONAL RADIOLOGY PROCEDURE N/A 07/29/2021    Procedure: Abdominal Aortagram with Runoff;  Surgeon: Jermaine Hernandez MD;  Location: [facility] CATH INVASIVE LOCATION;  Service: Cardiovascular;  Laterality: N/A;    KNEE  ARTHROSCOPY      right x 2, left x 1    LACERATION REPAIR      right leg    LEG SURGERY      2 for fracture of rt leg     TONSILLECTOMY      Adnoidectomy    TRANS METATARSAL AMPUTATION Left 08/02/2022    Procedure: GREAT TOE AMPUTATION LEFT;  Surgeon: Gopal Márquez MD;  Location: [facility] OR;  Service: Vascular;  Laterality: Left;           Wound 04/22/24 2240 Left anterior third toe (Active)   Dressing Appearance open to air 04/26/24 0800   Closure Unable to assess 04/26/24 1000       Wound 04/22/24 2240 Right posterior plantar Blisters (Active)   Dressing Appearance dry;intact 04/26/24 0945   Closure Unable to assess 04/26/24 1000   Base dressing in place, unable to visualize 04/26/24 1000       Wound 04/22/24 2240 Right lower leg Other (comment) (Active)   Dressing Appearance intact;moist drainage 04/26/24 0945   Closure Unable to assess 04/26/24 1000   Base dressing in place, unable to visualize 04/26/24 1000   Periwound intact;dry 04/26/24 0945   Periwound Temperature warm 04/26/24 0945   Periwound Skin Turgor soft 04/26/24 0945   Edges irregular 04/26/24 0945   Drainage Characteristics/Odor serosanguineous 04/26/24 0945   Drainage Amount small 04/26/24 0945   Care, Wound cleansed with;soap and water;debrided 04/26/24 0945   Dressing Care dressing changed 04/26/24 0945      Lymphedema       Row Name 04/26/24 0945             Lymphedema Edema Assessment    Ptting Edema Category By severity  -      Pitting Edema Mild  -         Compression/Skin Care    Compression/Skin Care skin care;wrapping location;bandaging  -      Skin Care washed/dried;lotion applied  -MF      Wrapping Location lower extremity  -MF      Wrapping Location LE bilateral:;foot to knee  -      Wrapping Comments RLE unna boot with coban and rafael  -                User Key  (r) = Recorded By, (t) = Taken By, (c) = Cosigned By      Initials Name Provider Type    Cooper Isabel, PT Physical Therapist                    WOUND  DEBRIDEMENT  Total area of Debridement: ~10cm2  Debridement Site 1  Location- Site 1: RLE wound  Selective Debridement- Site 1: Wound Surface <20cmsq  Instruments- Site 1: tweezers  Excised Tissue Description- Site 1: minimum, slough  Bleeding- Site 1: none               PT Assessment (Last 12 Hours)       PT Evaluation and Treatment       Row Name 04/26/24 0945          Physical Therapy Time and Intention    Subjective Information complains of;weakness;fatigue;pain  -MF     Document Type therapy note (daily note);wound care  -MF     Mode of Treatment individual therapy;physical therapy  -       Row Name 04/26/24 0945          Pain Scale: FACES Pre/Post-Treatment    Pain: FACES Scale, Pretreatment 2-->hurts little bit  -MF     Posttreatment Pain Rating 2-->hurts little bit  -MF     Pre/Posttreatment Pain Comment pt intubated  -       Row Name             Wound 04/22/24 2240 Left anterior third toe    Wound - Properties Group Placement Date: 04/22/24  -BS Placement Time: 2240  -BS Side: Left  -BS Orientation: anterior  -BS Location: third toe  -BS    Retired Wound - Properties Group Placement Date: 04/22/24  -BS Placement Time: 2240  -BS Side: Left  -BS Orientation: anterior  -BS Location: third toe  -BS    Retired Wound - Properties Group Date first assessed: 04/22/24  -BS Time first assessed: 2240  -BS Side: Left  -BS Location: third toe  -BS      Row Name 04/26/24 0945          Wound 04/22/24 2240 Right posterior plantar Blisters    Wound - Properties Group Placement Date: 04/22/24  -BS Placement Time: 2240  -BS Side: Right  -BS Orientation: posterior  -BS Location: plantar  -BS Primary Wound Type: Blisters  -BS    Dressing Appearance dry;intact  -MF     Base other (see comments)  intact bulla  -MF     Retired Wound - Properties Group Placement Date: 04/22/24  -BS Placement Time: 2240  -BS Side: Right  -BS Orientation: posterior  -BS Location: plantar  -BS Primary Wound Type: Blisters  -BS    Retired Wound -  Properties Group Date first assessed: 04/22/24  -BS Time first assessed: 2240  -BS Side: Right  -BS Location: plantar  -BS Primary Wound Type: Blisters  -BS      Row Name 04/26/24 0945          Wound 04/22/24 2240 Right lower leg Other (comment)    Wound - Properties Group Placement Date: 04/22/24  -BS Placement Time: 2240  -BS Side: Right  -BS Orientation: lower  -BS Location: leg  -BS Primary Wound Type: Other  -BS    Dressing Appearance intact;moist drainage  -MF     Base moist;pink;red  -MF     Periwound intact;dry  -MF     Periwound Temperature warm  -MF     Periwound Skin Turgor soft  -MF     Edges irregular  -MF     Drainage Characteristics/Odor serosanguineous  -MF     Drainage Amount small  -MF     Care, Wound cleansed with;soap and water;debrided  -MF     Dressing Care dressing changed  -MF     Retired Wound - Properties Group Placement Date: 04/22/24  -BS Placement Time: 2240  -BS Side: Right  -BS Orientation: lower  -BS Location: leg  -BS Primary Wound Type: Other  -BS    Retired Wound - Properties Group Date first assessed: 04/22/24  -BS Time first assessed: 2240  -BS Side: Right  -BS Location: leg  -BS Primary Wound Type: Other  -BS      Row Name 04/26/24 0945          Coping    Observed Emotional State restless;cooperative  -     Verbalized Emotional State other (see comments)  pt intubated  -     Trust Relationship/Rapport care explained  -       Row Name 04/26/24 0945          Plan of Care Review    Plan of Care Reviewed With spouse  -     Outcome Evaluation RLE unna boot changed with no issues noted. PT was able to debride a small amount of nonviable crust and slough / biofilm with moist pink / red wound base noted. PT will f/u with unna boot change in 2-3 days.  -MF       Row Name 04/26/24 0945          Positioning and Restraints    Pre-Treatment Position in bed  -MF     Post Treatment Position bed  -MF     In Bed supine;call light within reach;with family/caregiver  -               User  Key  (r) = Recorded By, (t) = Taken By, (c) = Cosigned By      Initials Name Provider Type     Cooper Ni, PT Physical Therapist    Vivian Manning, RN Registered Nurse                  Physical Therapy Education       Title: PT OT SLP Therapies (Done)       Topic: Physical Therapy (Done)       Point: Mobility training (Done)       Learning Progress Summary             Patient Eager, E, VU,DU,NR by  at 4/24/2024 1452    Comment: Reviewed safety/technique w/transfers, ambulation, HEP, PT POC    Acceptance, E, VU by KR at 4/23/2024 1120   Family Acceptance, E, VU by KR at 4/23/2024 1120                         Point: Home exercise program (Done)       Learning Progress Summary             Patient Eager, E, VU,DU,NR by  at 4/24/2024 1452    Comment: Reviewed safety/technique w/transfers, ambulation, HEP, PT POC                         Point: Body mechanics (Done)       Learning Progress Summary             Patient Eager, E, VU,DU,NR by  at 4/24/2024 1452    Comment: Reviewed safety/technique w/transfers, ambulation, HEP, PT POC    Acceptance, E, VU by KR at 4/23/2024 1120   Family Acceptance, E, VU by KR at 4/23/2024 1120                         Point: Precautions (Done)       Learning Progress Summary             Patient Eager, E, VU,DU,NR by  at 4/24/2024 1452    Comment: Reviewed safety/technique w/transfers, ambulation, HEP, PT POC    Acceptance, E, VU by KR at 4/23/2024 1120   Family Acceptance, E, VU by KR at 4/23/2024 1120                                         User Key       Initials Effective Dates Name Provider Type Discipline     06/01/21 -  Jenna Stanley, PT Physical Therapist PT     12/30/22 -  Tiffanie Erwin, PT Physical Therapist PT                    Recommendation and Plan  Anticipated Equipment Needs at Discharge (PT): front wheeled walker  Anticipated Discharge Disposition (PT): inpatient rehabilitation facility  Planned Therapy Interventions (PT): wound care,  patient/family education  Therapy Frequency (PT): daily  Plan of Care Reviewed With: spouse           Outcome Evaluation: RLE unna boot changed with no issues noted. PT was able to debride a small amount of nonviable crust and slough / biofilm with moist pink / red wound base noted. PT will f/u with unna boot change in 2-3 days.  Plan of Care Reviewed With: spouse            Time Calculation   PT Charges       Row Name 04/26/24 0945             Time Calculation    Start Time 0945  -MF      PT Goal Re-Cert Due Date 05/03/24  -         Untimed Charges    29580-Unna Boot 15  -MF      91428-Udcepuxfu debridement 25  -MF         Total Minutes    Untimed Charges Total Minutes 40  -MF       Total Minutes 40  -MF                User Key  (r) = Recorded By, (t) = Taken By, (c) = Cosigned By      Initials Name Provider Type    Cooper Isabel, PT Physical Therapist                            PT G-Codes  Outcome Measure Options: AM-PAC 6 Clicks Daily Activity (OT)  AM-PAC 6 Clicks Score (PT): 6  AM-PAC 6 Clicks Score (OT): 16  Modified Deena Scale: 4 - Moderately severe disability.  Unable to walk without assistance, and unable to attend to own bodily needs without assistance.       Cooper Ni, PT  4/26/2024

## 2024-04-26 NOTE — PROGRESS NOTES
INFECTIOUS DISEASE Progress Note    Jermaine Singh  1945  [id number]    Admission Date: 4/22/2024      Requesting Provider: No ref. provider found  Evaluating Physician: Lincoln Padilla MD    Reason for Consultation:  UTI    History of present illness:    4/23/24: Patient is a 79 y.o. male  with a history of type 2 diabetes mellitus, peripheral neuropathy, stage III chronic kidney disease, bilateral DVTs, paroxysmal atrial fibrillation, sick sinus syndrome status post pacemaker placement, CHF, peripheral vascular disease, and left diabetic foot infection who is seen today for evaluation of UTI.  He had a history of UTI last month with urine cultures and early March growing yeast.  He was apparently treated with an antifungal agent.    Over the last few days he noted the onset of profound weakness and fatigue.  He also noticed some dysuria.  He was brought to the emergency room and found to have pyuria and bacteriuria with leukocytosis.   Urine culture was sent and he was started on ceftriaxone.   His white blood cell count today is 11.8.   He was noted to have a blister over his right fifth metatarsal head and a chronic right pretibial wound.    4/24/24: He remains afebrile.  His urine culture was finalized as negative.  I have asked the laboratory to hold the urine culture for yeast but they did not and it was disposed of last night.   He states that his dysuria is improved today.  Feels better.     4/25/2024:  He remains afebrile.  White blood cell count today is 6.7.  Creatinine is 1.99.   Urinalysis yesterday revealed too numerous to count white blood cells.  He denied dysuria this morning.  After I saw him he suffered a V-fib cardiac arrest and was transferred to the ICU.  He is endotracheally intubated    4/26/24:   His maximum temperature over the last 24 hours is 99.5.  Repeat urine culture from 4/24 is pending.  He remains on ventilatory support.    Creatinine is 2.28.  ALT is  1028.  White  blood cell count is 9.95.  O2 saturation is 100% on an FiO2 of 30%.  Chest x-ray reveals no pulmonary infiltrates. \    Later this morning he was extubated.  He is confused.  He knows his name and his birthdate.  He cannot answer more complex questions.    Past Medical History:   Diagnosis Date    Allergic     Arthritis     Asthma     Coronary artery disease     Diabetes mellitus 2000    started on inuslin 12/2018; started on po meds in 2000; checking blood sugars daily     Diabetic foot infection 07/23/2023    Disease of thyroid gland     po meds daily for hypothyroidism     Elevated serum creatinine 11/15/2022    Elevated troponin 10/02/2022    Generalized weakness 11/15/2022    History of fracture as a child     rt leg- severe     Hyperlipidemia     Hypertension     Hypothyroidism     PAF (paroxysmal atrial fibrillation) 07/23/2023    Peripheral neuropathy     Peripheral vascular disease     s/p angiogram 2/19-needs stent in left leg     Pleural effusion, bilateral 11/15/2022    Proximal phalanx fracture of the second digit extending into the second metatarsal joint 07/28/2022    RBBB     Right knee pain     Status post amputation of great toe and second toe of left foot 07/23/2023    Status post amputation of great toe, left 11/15/2022    Unstable angina 02/12/2019    Added automatically from request for surgery [id number]    Vitamin D deficiency        Past Surgical History:   Procedure Laterality Date    AMPUTATION DIGIT Left 01/17/2023    Procedure: AMPUTATION DIGIT LEFT;  Surgeon: Gopal Márquez MD;  Location: [facility] OR;  Service: Vascular;  Laterality: Left;    APPENDECTOMY      CARDIAC CATHETERIZATION N/A 02/14/2019    Procedure: Left Heart Cath;  Surgeon: Cooper Apodaca MD;  Location:  [facility] CATH INVASIVE LOCATION;  Service: Cardiology    CARDIAC ELECTROPHYSIOLOGY PROCEDURE N/A 05/18/2022    Procedure: DEVICE IMPLANT;  Surgeon: Cooper Apodaca MD;  Location:  [facility] CATH INVASIVE LOCATION;   Service: Cardiology;  Laterality: N/A;    CARDIAC SURGERY      COLONOSCOPY      CORONARY ARTERY BYPASS GRAFT N/A 2019    Procedure: MEDIAN STERNOTOMY, CORONARY ARTERY BYPASS GRAFT X3, UTILIZING THE LEFT INTERNAL MAMMARY ARTERY, EVH AND OPEN HARVEST OF THE RIGHT GREATER SAPHENOUS VEIN, EXPLORATION OF THE LEFT LEG;  Surgeon: Ap Au MD;  Location:  [facility] OR;  Service: Cardiothoracic    EYE SURGERY Bilateral     cataracts     FRACTURE SURGERY      INTERVENTIONAL RADIOLOGY PROCEDURE N/A 2021    Procedure: Abdominal Aortagram with Runoff;  Surgeon: Jermaine Hernandez MD;  Location:  [facility] CATH INVASIVE LOCATION;  Service: Cardiovascular;  Laterality: N/A;    KNEE ARTHROSCOPY      right x 2, left x 1    LACERATION REPAIR      right leg    LEG SURGERY      2 for fracture of rt leg     TONSILLECTOMY      Adnoidectomy    TRANS METATARSAL AMPUTATION Left 2022    Procedure: GREAT TOE AMPUTATION LEFT;  Surgeon: Gopal Márquez MD;  Location:  [facility] OR;  Service: Vascular;  Laterality: Left;       Family History   Problem Relation Age of Onset    Coronary artery disease Mother     Diabetes Mother     Hyperlipidemia Mother     Cancer Father        Social History     Socioeconomic History    Marital status:     Number of children: 2   Tobacco Use    Smoking status: Never     Passive exposure: Past    Smokeless tobacco: Never   Vaping Use    Vaping status: Never Used   Substance and Sexual Activity    Alcohol use: Not Currently     Comment: every 2-3 months    Drug use: No    Sexual activity: Not Currently     Partners: Female       Allergies   Allergen Reactions    Vancomycin Other (See Comments)     Kidney failure per last admission         Medication:    Current Facility-Administered Medications:     acetaminophen (TYLENOL) tablet 650 mg, 650 mg, Oral, Q6H PRN, Maricel Peraza, APRN, 650 mg at 24 0940    [] amiodarone 360 mg in 200 mL D5W infusion, 1 mg/min, Intravenous,  Continuous, Stopped at 04/25/24 2037 **FOLLOWED BY** amiodarone 360 mg in 200 mL D5W infusion, 0.5 mg/min, Intravenous, Continuous, Jose Ramon Murphy MD, Last Rate: 16.67 mL/hr at 04/25/24 2038, 0.5 mg/min at 04/25/24 2038    [Held by provider] apixaban (ELIQUIS) tablet 5 mg, 5 mg, Oral, Q12H, Maricel Peraza APRN, 5 mg at 04/25/24 0840    aspirin chewable tablet 81 mg, 81 mg, Oral, Daily, Maricel Peraza APRN, 81 mg at 04/25/24 0840    [Held by provider] atorvastatin (LIPITOR) tablet 20 mg, 20 mg, Oral, Nightly, Maricel Peraza APRN, 20 mg at 04/24/24 2100    azelastine (ASTELIN) nasal spray 2 spray, 2 spray, Each Nare, BID, Maricel Peraza APRN    sennosides-docusate (PERICOLACE) 8.6-50 MG per tablet 2 tablet, 2 tablet, Oral, BID PRN **AND** polyethylene glycol (MIRALAX) packet 17 g, 17 g, Oral, Daily PRN, 17 g at 04/22/24 2225 **AND** bisacodyl (DULCOLAX) EC tablet 5 mg, 5 mg, Oral, Daily PRN, 5 mg at 04/22/24 2225 **AND** bisacodyl (DULCOLAX) suppository 10 mg, 10 mg, Rectal, Daily PRN, Maricel Peraza APRN    [Held by provider] carvedilol (COREG) tablet 3.125 mg, 3.125 mg, Oral, BID With Meals, Maricel Peraza APRN, 3.125 mg at 04/22/24 2214    chlorhexidine (PERIDEX) 0.12 % solution 15 mL, 15 mL, Mouth/Throat, Q12H, Kemi Castellano APRN, 15 mL at 04/25/24 2018    dextrose (D50W) (25 g/50 mL) IV injection 25 g, 25 g, Intravenous, Q15 Min PRN, Maricel Peraza APRN    EPINEPHrine 10 mg in 250 mL infusion, 0.02-0.3 mcg/kg/min, Intravenous, Titrated, Eyad Ledesma MD, Stopped at 04/25/24 1925    famotidine (PEPCID) tablet 20 mg, 20 mg, Oral, BID PRN, Maricel Peraza APRN, 20 mg at 04/22/24 2214    fentaNYL (SUBLIMAZE) bolus from bag 10 mcg/mL injection 50 mcg, 50 mcg, Intravenous, Q30 Min PRN, Kemi Castellano APRN    fentaNYL 2500 mcg/250 mL NS infusion,  mcg/hr, Intravenous, Titrated, Kemi Castellano APRN, Last Rate: 30 mL/hr at 04/26/24  0657, 300 mcg/hr at 04/26/24 0657    glucagon (GLUCAGEN) injection 1 mg, 1 mg, Intramuscular, Q15 Min PRN, Maricel Peraza APRN    insulin glargine (LANTUS, SEMGLEE) injection 8 Units, 8 Units, Subcutaneous, Nightly, Luz Elena Batista DO, 8 Units at 04/25/24 2018    insulin regular (humuLIN R,novoLIN R) injection 2-9 Units, 2-9 Units, Subcutaneous, Q6H, Sarah Moreland, PharmD, 2 Units at 04/26/24 0602    [Held by provider] isosorbide mononitrate (IMDUR) 24 hr tablet 30 mg, 30 mg, Oral, Daily, Maricel Peraza APRN    lactobacillus acidophilus (RISAQUAD) capsule 1 capsule, 1 capsule, Oral, Daily, Maricel Peraza APRN, 1 capsule at 04/25/24 0840    levothyroxine (SYNTHROID, LEVOTHROID) tablet 88 mcg, 88 mcg, Oral, Q AM, Maricel Peraza APRN, 88 mcg at 04/25/24 0633    [Held by provider] losartan (COZAAR) tablet 50 mg, 50 mg, Oral, Daily, Maricel Peraza APRN    Magnesium Low Dose Replacement - Follow Nurse / BPA Driven Protocol, , Does not apply, PRN, Maricel Peraza APRN    midazolam (VERSED) 100 mg in 100mL NS infusion, 1-10 mg/hr, Intravenous, Titrated, Kemi Castellano APRN, Stopped at 04/25/24 2051    midazolam (VERSED) bolus from bag 1 mg/mL solution 1 mg, 1 mg, Intravenous, Q30 Min PRN, Kemi Castellano APRN    midodrine (PROAMATINE) tablet 5 mg, 5 mg, Oral, TID Pasquale LOYA Abby L, APRN, 5 mg at 04/25/24 1316    nitroglycerin (NITROSTAT) SL tablet 0.4 mg, 0.4 mg, Sublingual, Q5 Min PRN, Maricel Peraza APRN    norepinephrine (LEVOPHED) 8 mg in 250 mL NS infusion (premix), 0.02-0.3 mcg/kg/min, Intravenous, Titrated, Eyad Ledesma MD, Last Rate: 5.83 mL/hr at 04/26/24 0456, 0.04 mcg/kg/min at 04/26/24 0456    pantoprazole (PROTONIX) injection 40 mg, 40 mg, Intravenous, Q AM, Sarah Moreland, PharmD, 40 mg at 04/26/24 0603    phenylephrine (JENNIFER-SYNEPHRINE) 50 mg in sodium chloride 0.9 % 250 mL infusion, 0.5-3 mcg/kg/min, Intravenous, Titrated, Eyad Ledesma  MD LILIAN, Stopped at 24 1547    polyethylene glycol (MIRALAX) packet 17 g, 17 g, Oral, Daily, Artur Avila MD, 17 g at 24 0901    prochlorperazine (COMPAZINE) injection 5 mg, 5 mg, Intravenous, Q6H PRN, Peraza, Maricel W, APRN, 5 mg at 24 2225    sodium chloride 0.9 % flush 10 mL, 10 mL, Intravenous, PRN, Peraza, Maricel W, APRN    sodium chloride 0.9 % flush 10 mL, 10 mL, Intravenous, Q12H, Peraza, Maricel W, APRN, 10 mL at 24    tamsulosin (FLOMAX) 24 hr capsule 0.4 mg, 0.4 mg, Oral, Daily, Peraza, Maricel W, APRN, 0.4 mg at 24 0840    Antibiotics:  Anti-Infectives (From admission, onward)      None              Review of Systems:  See HPI      Physical Exam:   Vital Signs  Temp (24hrs), Av.9 °F (36.6 °C), Min:96.1 °F (35.6 °C), Max:99.5 °F (37.5 °C)    Temp  Min: 96.1 °F (35.6 °C)  Max: 99.5 °F (37.5 °C)  BP  Min: 58/43  Max: 153/108  Pulse  Min: 60  Max: 92  Resp  Min: 16  Max: 28  SpO2  Min: 90 %  Max: 100 %    GENERAL:   Alert but debilitated appearing  HEENT: Normocephalic, atraumatic.  PERRL. EOMI. No conjunctival injection. No icterus.  No labial ulcers  NECK: Supple   HEART:  irregular rate  LUNGS:   ABDOMEN: Soft, nontender, nondistended. No rebound or guarding. NO mass or HSM.  EXT:  No cyanosis, clubbing or edema. No cord.  :   Fonseca catheter in place.  MSK: No joint effusions or erythema  SKIN: Warm and dry without cutaneous eruptions on Inspection/palpation.    NEURO:   alert but confused.  He knows his name.  He can move all of his extremities.    Laboratory Data    Results from last 7 days   Lab Units 24  0410 24  1441 24  0412   WBC 10*3/mm3 9.95 9.14 6.71   HEMOGLOBIN g/dL 7.9* 9.6* 8.9*   HEMATOCRIT % 23.7* 30.8* 26.9*   PLATELETS 10*3/mm3 158 169 113*     Results from last 7 days   Lab Units 24  0410   SODIUM mmol/L 137   POTASSIUM mmol/L 4.8   CHLORIDE mmol/L 106   CO2 mmol/L 16.0*   BUN mg/dL 78*   CREATININE  "mg/dL 2.28*   GLUCOSE mg/dL 188*   CALCIUM mg/dL 7.9*     Results from last 7 days   Lab Units 04/26/24  0410   ALK PHOS U/L 130*   BILIRUBIN mg/dL 0.6   ALT (SGPT) U/L 1,028*   AST (SGOT) U/L 787*                         Estimated Creatinine Clearance: 30.3 mL/min (A) (by C-G formula based on SCr of 2.28 mg/dL (H)).      Microbiology:  No results found for: \"ACANTHNAEG\", \"AFBCX\", \"BPERTUSSISCX\", \"BLOODCX\"  No results found for: \"BCIDPCR\", \"CXREFLEX\", \"CSFCX\", \"CULTURETIS\"  No results found for: \"CULTURES\", \"HSVCX\", \"URCX\"  No results found for: \"EYECULTURE\", \"GCCX\", \"HSVCULTURE\", \"LABHSV\"  No results found for: \"LEGIONELLA\", \"MRSACX\", \"MUMPSCX\", \"MYCOPLASCX\"  No results found for: \"NOCARDIACX\", \"STOOLCX\"  Urine Culture   Date Value Ref Range Status   04/22/2024 No growth  Preliminary     No results found for: \"VIRALCULTU\", \"WOUNDCX\"        Radiology:  Imaging Results (Last 72 Hours)       Procedure Component Value Units Date/Time    XR Chest 1 View [[id number]] Collected: 04/26/24 0542     Updated: 04/26/24 0546    Narrative:      XR CHEST 1 VW    Date of Exam: 4/26/2024 3:11 AM EDT    Indication: Respiratory failure, cardiac arrest    Comparison: 1 day prior.    Findings:  Support hardware projects unchanged. There is no new focal airspace opacity. No significant pleural effusion or distinct pneumothorax. Unchanged mild cardiac enlargement.      Impression:      Impression:  No significant interval change.      Electronically Signed: Christian Wang MD    4/26/2024 5:43 AM EDT    Workstation ID: [id number]    XR Chest 1 View [[id number]] Collected: 04/25/24 1517     Updated: 04/25/24 1522    Narrative:      XR CHEST 1 VW    Date of Exam: 4/25/2024 2:42 PM EDT    Indication: intubation    Comparison: 4/22/2024.    Findings:  Endotracheal tube projects in good position. Left chest wall ICD projects unchanged. There is no significant pleural effusion or distinct in the thorax. No new focal airspace consolidation. Some " stable mild linear atelectasis is noted.      Impression:      Impression:  Endotracheal tube projects in good position. Left chest wall ICD projects unchanged. There is no significant pleural effusion or distinct in the thorax. No new focal airspace consolidation. Some stable mild linear atelectasis is noted.        Electronically Signed: Christian Wang MD    4/25/2024 3:19 PM EDT    Workstation ID: [id number]          I read his chest x-ray of 4/26      Impression:    1.  UTI-the previous urine culture performed on 3/5/2024 grew yeast.   There is no evidence of bacterial infection.  I discontinued ceftriaxone. I reordered a urine culture.  This will be held for yeast    2.   V-fib arrest-status post resuscitation.   He required endotracheal intubation/mechanical ventilation but has now been extubated.   3.  Right fifth metatarsal wound/bullous lesion-without evidence of cellulitis   4.  Chronic right pretibial dermatitis-without evidence of cellulitis  5.  Type 2 diabetes mellitus  6.  coronary artery disease/status post CABG, 3/22/2019  7.  Chronic HFrEF  8.  Stage IIIb chronic kidney disease  9.  Paroxysmal atrial fibrillation  10.  Acute hypoxic respiratory failure- status post endotracheal intubation/mechanical ventilation but now extubated  11.  Ischemic hepatitis    PLAN/RECOMMENDATIONS:   1.  Offload the right fifth metatarsal wound  2.  Repeat Urine culture-hold in order to detect yeast.  3.  ICU supportive care per cardiology and intensivist service    Lincoln Padilla MD  4/26/2024  07:17 EDT

## 2024-04-26 NOTE — PLAN OF CARE
Goal Outcome Evaluation:  Plan of Care Reviewed With: spouse           Outcome Evaluation: RLE unna boot changed with no issues noted. PT was able to debride a small amount of nonviable crust and slough / biofilm with moist pink / red wound base noted. PT will f/u with unna boot change in 2-3 days.

## 2024-04-26 NOTE — PLAN OF CARE
Goal Outcome Evaluation:  Plan of Care Reviewed With: patient, spouse        Progress: improving    *extubated at 1114 to 6L NC  *at 1235, RR 8 shallow breathing and then sats dropped to 73% , APRN at bedside, bagged and put on bipap at 100%  *shortly after patient vomited while on bipap , tolerated NRB after  *chest xray obtained   *EEG obtained   *GTTs: Levo 0.06 and Amio 0.5  *uop 400 ml  *family at bedside all day, POC discussed, code status change        Problem: Adult Inpatient Plan of Care  Goal: Plan of Care Review  Outcome: Ongoing, Progressing  Flowsheets (Taken 4/26/2024 1653)  Progress: improving  Plan of Care Reviewed With:   patient   spouse  Goal: Patient-Specific Goal (Individualized)  Outcome: Ongoing, Progressing  Goal: Absence of Hospital-Acquired Illness or Injury  Outcome: Ongoing, Progressing  Intervention: Identify and Manage Fall Risk  Recent Flowsheet Documentation  Taken 4/26/2024 1600 by Elise Medina RN  Safety Promotion/Fall Prevention:   activity supervised   assistive device/personal items within reach   clutter free environment maintained   fall prevention program maintained   safety round/check completed  Taken 4/26/2024 1400 by Elise Medina RN  Safety Promotion/Fall Prevention:   activity supervised   assistive device/personal items within reach   clutter free environment maintained   fall prevention program maintained   safety round/check completed  Taken 4/26/2024 1200 by Elise Medina, RN  Safety Promotion/Fall Prevention:   activity supervised   assistive device/personal items within reach   clutter free environment maintained   fall prevention program maintained   safety round/check completed   room organization consistent  Taken 4/26/2024 1000 by Elise Medina, RN  Safety Promotion/Fall Prevention:   activity supervised   assistive device/personal items within reach   clutter free environment maintained   fall prevention program maintained   safety  round/check completed  Taken 4/26/2024 0800 by Elise Medina, RN  Safety Promotion/Fall Prevention:   activity supervised   assistive device/personal items within reach   clutter free environment maintained   fall prevention program maintained   safety round/check completed   room organization consistent  Intervention: Prevent Skin Injury  Recent Flowsheet Documentation  Taken 4/26/2024 1600 by Elise Medina, RN  Body Position:   turned   weight shifting  Skin Protection:   adhesive use limited   tubing/devices free from skin contact   transparent dressing maintained   skin-to-skin areas padded   skin-to-device areas padded   skin sealant/moisture barrier applied   incontinence pads utilized  Taken 4/26/2024 1400 by Elise Medina, RN  Body Position: weight shifting  Skin Protection:   adhesive use limited   tubing/devices free from skin contact   skin sealant/moisture barrier applied   skin-to-device areas padded   skin-to-skin areas padded   transparent dressing maintained   incontinence pads utilized  Taken 4/26/2024 1200 by Elise Medina, RN  Body Position:   turned   weight shifting  Skin Protection:   adhesive use limited   tubing/devices free from skin contact   transparent dressing maintained   skin-to-device areas padded   skin sealant/moisture barrier applied   skin-to-skin areas padded   incontinence pads utilized  Taken 4/26/2024 1000 by Elise Medina, RN  Body Position:   turned   weight shifting  Skin Protection:   adhesive use limited   tubing/devices free from skin contact   transparent dressing maintained   skin-to-skin areas padded   skin-to-device areas padded   skin sealant/moisture barrier applied   incontinence pads utilized  Taken 4/26/2024 0800 by Elise Medina, RN  Body Position:   turned   weight shifting  Skin Protection:   adhesive use limited   tubing/devices free from skin contact   transparent dressing maintained   skin-to-skin areas padded   skin-to-device areas  padded   skin sealant/moisture barrier applied   incontinence pads utilized  Intervention: Prevent and Manage VTE (Venous Thromboembolism) Risk  Recent Flowsheet Documentation  Taken 4/26/2024 1600 by Elise Medina RN  Activity Management: bedrest  Taken 4/26/2024 1400 by Elise Medina RN  Activity Management: bedrest  Taken 4/26/2024 1200 by Elise Medina RN  Activity Management: bedrest  Taken 4/26/2024 1000 by Elise Medina RN  Activity Management: bedrest  Taken 4/26/2024 0800 by Elise Medina RN  Activity Management: bedrest  Intervention: Prevent Infection  Recent Flowsheet Documentation  Taken 4/26/2024 0800 by Elise Medina RN  Infection Prevention:   environmental surveillance performed   hand hygiene promoted   equipment surfaces disinfected   personal protective equipment utilized  Goal: Optimal Comfort and Wellbeing  Outcome: Ongoing, Progressing  Intervention: Monitor Pain and Promote Comfort  Recent Flowsheet Documentation  Taken 4/26/2024 1600 by Elise Medina RN  Pain Management Interventions: quiet environment facilitated  Taken 4/26/2024 1114 by Elise Medina RN  Pain Management Interventions: pain management plan reviewed with patient/caregiver  Intervention: Provide Person-Centered Care  Recent Flowsheet Documentation  Taken 4/26/2024 1600 by Elise Medina RN  Trust Relationship/Rapport: care explained  Taken 4/26/2024 1200 by Elise Medina RN  Trust Relationship/Rapport: care explained  Taken 4/26/2024 1114 by Elise Medina RN  Trust Relationship/Rapport: care explained  Taken 4/26/2024 0800 by Elise Medina RN  Trust Relationship/Rapport:   choices provided   emotional support provided   care explained  Goal: Readiness for Transition of Care  Outcome: Ongoing, Progressing     Problem: Skin Injury Risk Increased  Goal: Skin Health and Integrity  Outcome: Ongoing, Progressing  Intervention: Optimize Skin Protection  Recent Flowsheet  Documentation  Taken 4/26/2024 1600 by Elise Medina RN  Pressure Reduction Techniques:   frequent weight shift encouraged   heels elevated off bed   pressure points protected   positioned off wounds   weight shift assistance provided  Head of Bed (HOB) Positioning: HOB at 30 degrees  Pressure Reduction Devices:   alternating pressure pump (ADD)   pressure-redistributing mattress utilized   specialty bed utilized   heel offloading device utilized   positioning supports utilized   elbow protectors utilized  Skin Protection:   adhesive use limited   tubing/devices free from skin contact   transparent dressing maintained   skin-to-skin areas padded   skin-to-device areas padded   skin sealant/moisture barrier applied   incontinence pads utilized  Taken 4/26/2024 1400 by Elise Medina, RN  Pressure Reduction Techniques:   frequent weight shift encouraged   heels elevated off bed   positioned off wounds   pressure points protected   weight shift assistance provided  Head of Bed (HOB) Positioning: HOB at 30 degrees  Pressure Reduction Devices:   alternating pressure pump (ADD)   specialty bed utilized   pressure-redistributing mattress utilized   positioning supports utilized   heel offloading device utilized   elbow protectors utilized  Skin Protection:   adhesive use limited   tubing/devices free from skin contact   skin sealant/moisture barrier applied   skin-to-device areas padded   skin-to-skin areas padded   transparent dressing maintained   incontinence pads utilized  Taken 4/26/2024 1200 by Elise Medina, RN  Pressure Reduction Techniques:   frequent weight shift encouraged   heels elevated off bed   positioned off wounds   pressure points protected   weight shift assistance provided  Head of Bed (HOB) Positioning: HOB at 20-30 degrees  Pressure Reduction Devices:   alternating pressure pump (ADD)   specialty bed utilized   pressure-redistributing mattress utilized   heel offloading device utilized    positioning supports utilized   elbow protectors utilized  Skin Protection:   adhesive use limited   tubing/devices free from skin contact   transparent dressing maintained   skin-to-device areas padded   skin sealant/moisture barrier applied   skin-to-skin areas padded   incontinence pads utilized  Taken 4/26/2024 1000 by Elise Medina RN  Pressure Reduction Techniques:   frequent weight shift encouraged   positioned off wounds   heels elevated off bed   pressure points protected   weight shift assistance provided  Head of Bed (HOB) Positioning: HOB at 30 degrees  Pressure Reduction Devices:   alternating pressure pump (ADD)   elbow protectors utilized   specialty bed utilized   pressure-redistributing mattress utilized   positioning supports utilized   heel offloading device utilized  Skin Protection:   adhesive use limited   tubing/devices free from skin contact   transparent dressing maintained   skin-to-skin areas padded   skin-to-device areas padded   skin sealant/moisture barrier applied   incontinence pads utilized  Taken 4/26/2024 0800 by Elise Medina, RN  Pressure Reduction Techniques:   frequent weight shift encouraged   heels elevated off bed   pressure points protected   positioned off wounds   weight shift assistance provided  Head of Bed (HOB) Positioning: HOB at 30-45 degrees  Pressure Reduction Devices:   alternating pressure pump (ADD)   specialty bed utilized   pressure-redistributing mattress utilized   positioning supports utilized   heel offloading device utilized   elbow protectors utilized  Skin Protection:   adhesive use limited   tubing/devices free from skin contact   transparent dressing maintained   skin-to-skin areas padded   skin-to-device areas padded   skin sealant/moisture barrier applied   incontinence pads utilized     Problem: Fall Injury Risk  Goal: Absence of Fall and Fall-Related Injury  Outcome: Ongoing, Progressing  Intervention: Identify and Manage  Contributors  Recent Flowsheet Documentation  Taken 4/26/2024 0800 by Elise Medina, RN  Medication Review/Management:   medications reviewed   dosing adjusted   high-risk medications identified  Intervention: Promote Injury-Free Environment  Recent Flowsheet Documentation  Taken 4/26/2024 1600 by Elise Medina RN  Safety Promotion/Fall Prevention:   activity supervised   assistive device/personal items within reach   clutter free environment maintained   fall prevention program maintained   safety round/check completed  Taken 4/26/2024 1400 by Elise Medina RN  Safety Promotion/Fall Prevention:   activity supervised   assistive device/personal items within reach   clutter free environment maintained   fall prevention program maintained   safety round/check completed  Taken 4/26/2024 1200 by Elise Medina RN  Safety Promotion/Fall Prevention:   activity supervised   assistive device/personal items within reach   clutter free environment maintained   fall prevention program maintained   safety round/check completed   room organization consistent  Taken 4/26/2024 1000 by Elise Medina RN  Safety Promotion/Fall Prevention:   activity supervised   assistive device/personal items within reach   clutter free environment maintained   fall prevention program maintained   safety round/check completed  Taken 4/26/2024 0800 by Elise Medina RN  Safety Promotion/Fall Prevention:   activity supervised   assistive device/personal items within reach   clutter free environment maintained   fall prevention program maintained   safety round/check completed   room organization consistent     Problem: Restraint, Nonviolent  Goal: Absence of Harm or Injury  Outcome: Ongoing, Progressing  Intervention: Implement Least Restrictive Safety Strategies  Recent Flowsheet Documentation  Taken 4/26/2024 1600 by Elise Medina RN  Medical Device Protection: tubing secured  Less Restrictive Alternative:    environment adjusted   surveillance provided   sensory stimulation limited  De-Escalation Techniques:   reoriented   quiet time facilitated  Diversional Activities: television  Taken 4/26/2024 1400 by Elise Medina RN  Medical Device Protection: tubing secured  Less Restrictive Alternative:   environment adjusted   surveillance provided   sensory stimulation limited  De-Escalation Techniques:   reoriented   quiet time facilitated  Diversional Activities: television  Taken 4/26/2024 1200 by Elise Medina RN  Medical Device Protection: tubing secured  Less Restrictive Alternative:   environment adjusted   surveillance provided   sensory stimulation limited  De-Escalation Techniques:   reoriented   quiet time facilitated  Diversional Activities: television  Taken 4/26/2024 1000 by Elise Medina RN  Medical Device Protection: tubing secured  Less Restrictive Alternative:   environment adjusted   surveillance provided   sensory stimulation limited  De-Escalation Techniques:   reoriented   quiet time facilitated  Diversional Activities: television  Taken 4/26/2024 0800 by Elise Medina RN  Medical Device Protection: tubing secured  Less Restrictive Alternative:   environment adjusted   surveillance provided   sensory stimulation limited  De-Escalation Techniques:   reoriented   quiet time facilitated  Diversional Activities: television  Intervention: Protect Dignity, Rights, and Personal Wellbeing  Recent Flowsheet Documentation  Taken 4/26/2024 1600 by Elise Medina RN  Trust Relationship/Rapport: care explained  Taken 4/26/2024 1200 by Elise Medina RN  Trust Relationship/Rapport: care explained  Taken 4/26/2024 1114 by Elise Medina, FRAN  Trust Relationship/Rapport: care explained  Taken 4/26/2024 0800 by Elise Medina RN  Trust Relationship/Rapport:   choices provided   emotional support provided   care explained  Intervention: Protect Skin and Joint Integrity  Recent Flowsheet  Documentation  Taken 4/26/2024 1600 by Elise Medina RN  Body Position:   turned   weight shifting  Taken 4/26/2024 1400 by Elise Medina RN  Body Position: weight shifting  Taken 4/26/2024 1200 by Elise Medina RN  Body Position:   turned   weight shifting  Taken 4/26/2024 1000 by Elise Medina, RN  Body Position:   turned   weight shifting  Taken 4/26/2024 0800 by Elsie Medina RN  Body Position:   turned   weight shifting     Problem: Noninvasive Ventilation Acute  Goal: Effective Unassisted Ventilation and Oxygenation  Outcome: Ongoing, Progressing

## 2024-04-26 NOTE — CONSULTS
Urology Consult Note    Jermaine Singh  LOS: 2      ASSESSMENT:    79 y.o. male with readmit for fungal UTI and a foot ulcer.  I have seen in office with recent cysto showing changes of cystitis and have planned office follow up and repeat cysto soon     DISCUSSION/RECOMMENDATIONS/PLAN:  No change in urologic plan at this time.  I will see back in office in next 2 weeks and schedule repeat cysto at some point - I will be managing his OAB annd leakage sx.  He should continue flomax and complete current antimicrobials.    HPI:  Jermaine Singh is a 79 y.o. male who was admitted 4/22/2024  for UTI (urinary tract infection) [N39.0]. He required cath in ER but voiding well since with some incontinence. Currently being treated for fungal UTI.  I did complete very recent cystoscopy on him.    Past Medical History:   Diagnosis Date    Allergic     Arthritis     Asthma     Coronary artery disease     Diabetes mellitus 2000    started on inuslin 12/2018; started on po meds in 2000; checking blood sugars daily     Diabetic foot infection 07/23/2023    Disease of thyroid gland     po meds daily for hypothyroidism     Elevated serum creatinine 11/15/2022    Elevated troponin 10/02/2022    Generalized weakness 11/15/2022    History of fracture as a child     rt leg- severe     Hyperlipidemia     Hypertension     Hypothyroidism     PAF (paroxysmal atrial fibrillation) 07/23/2023    Peripheral neuropathy     Peripheral vascular disease     s/p angiogram 2/19-needs stent in left leg     Pleural effusion, bilateral 11/15/2022    Proximal phalanx fracture of the second digit extending into the second metatarsal joint 07/28/2022    RBBB     Right knee pain     Status post amputation of great toe and second toe of left foot 07/23/2023    Status post amputation of great toe, left 11/15/2022    Unstable angina 02/12/2019    Added automatically from request for surgery [id number]    Vitamin D deficiency      Past Surgical History:    Procedure Laterality Date    AMPUTATION DIGIT Left 01/17/2023    Procedure: AMPUTATION DIGIT LEFT;  Surgeon: Gopal Márquez MD;  Location:  [facility];  Service: Vascular;  Laterality: Left;    APPENDECTOMY      CARDIAC CATHETERIZATION N/A 02/14/2019    Procedure: Left Heart Cath;  Surgeon: Cooper Apodaca MD;  Location:  [facility];  Service: Cardiology    CARDIAC ELECTROPHYSIOLOGY PROCEDURE N/A 05/18/2022    Procedure: DEVICE IMPLANT;  Surgeon: Cooper Apodaca MD;  Location:  [facility];  Service: Cardiology;  Laterality: N/A;    CARDIAC SURGERY      COLONOSCOPY      CORONARY ARTERY BYPASS GRAFT N/A 03/22/2019    Procedure: MEDIAN STERNOTOMY, CORONARY ARTERY BYPASS GRAFT X3, UTILIZING THE LEFT INTERNAL MAMMARY ARTERY, EVH AND OPEN HARVEST OF THE RIGHT GREATER SAPHENOUS VEIN, EXPLORATION OF THE LEFT LEG;  Surgeon: Ap Au MD;  Location:  [facility];  Service: Cardiothoracic    EYE SURGERY Bilateral     cataracts     FRACTURE SURGERY      INTERVENTIONAL RADIOLOGY PROCEDURE N/A 07/29/2021    Procedure: Abdominal Aortagram with Runoff;  Surgeon: Jermaine Hernandez MD;  Location:  [facility];  Service: Cardiovascular;  Laterality: N/A;    KNEE ARTHROSCOPY      right x 2, left x 1    LACERATION REPAIR      right leg    LEG SURGERY      2 for fracture of rt leg     TONSILLECTOMY      Adnoidectomy    TRANS METATARSAL AMPUTATION Left 08/02/2022    Procedure: GREAT TOE AMPUTATION LEFT;  Surgeon: Gopal Márquez MD;  Location:  [facility];  Service: Vascular;  Laterality: Left;     Medications Prior to Admission   Medication Sig Dispense Refill Last Dose    acetaminophen (TYLENOL) 325 MG tablet Take 2 tablets by mouth Every 6 (Six) Hours As Needed for Mild Pain.       apixaban (ELIQUIS) 5 MG tablet tablet Take 1 tablet by mouth Every 12 (Twelve) Hours. Indications: DVT/PE (active thrombosis) 180 tablet 0     aspirin 81 MG chewable tablet Chew 1 tablet  Daily. 90 tablet 3     atorvastatin (LIPITOR) 20 MG tablet Take 1 tablet by mouth Every Night. 90 tablet 1     azelastine (ASTELIN) 0.1 % nasal spray 2 sprays into the nostril(s) as directed by provider 2 (Two) Times a Day. Use in each nostril as directed 30 mL 2     bumetanide (BUMEX) 1 MG tablet Take 1 tablet by mouth 2 (Two) Times a Day. (Patient taking differently: Take 1 tablet by mouth 2 (Two) Times a Day. Qd-bid) 60 tablet 6     carvedilol (COREG) 3.125 MG tablet Take 1 tablet by mouth 2 (Two) Times a Day With Meals.       Cholecalciferol (VITAMIN D3) 5000 units tablet tablet Take 1 tablet by mouth Daily.       Diclofenac Sodium (VOLTAREN) 1 % gel gel Apply 2 g topically to the appropriate area as directed 4 (Four) Times a Day As Needed (pain).       Elastic Bandages & Supports (Hernia Support Right Medium) misc Use daily 1 each 0     famotidine (PEPCID) 20 MG tablet Take 1 tablet by mouth 2 (Two) Times a Day As Needed for Heartburn. 180 tablet 1     fluconazole (Diflucan) 100 MG tablet 2 PO  x 1d, then 1 PO QD x 4d 6 tablet 0     fluocinonide (LIDEX) 0.05 % cream Apply topically to affected area BID x 1 week then once daily as needed for rash 60 g 2     glucose blood (OneTouch Verio) test strip 1 each by Other route 2 (Two) Times a Day. Use as instructed 100 each 3     Hydrocortisone, Perianal, (ANUSOL-HC) 2.5 % rectal cream Insert  into the rectum 2 (Two) Times a Day. 30 g 3     insulin detemir (LEVEMIR) 100 UNIT/ML injection Inject 10 Units under the skin into the appropriate area as directed Every Night.       Insulin Lispro (humaLOG) 100 UNIT/ML injection Inject 3 Units under the skin into the appropriate area as directed 3 (Three) Times a Day Before Meals. Under 150 3 unit  150-200 4 units  200-250- 5 unit       isosorbide mononitrate (IMDUR) 30 MG 24 hr tablet Take 1 tablet by mouth Daily. 30 tablet 0     levothyroxine (SYNTHROID, LEVOTHROID) 88 MCG tablet Take 1 tablet by mouth Every Morning. 90  "tablet 1     linaclotide (Linzess) 290 MCG capsule capsule Take 1 capsule by mouth Every Morning Before Breakfast. 30 capsule 2     losartan (COZAAR) 100 MG tablet Take 0.5 tablets by mouth Daily.       Misc. Devices (Rollator Ultra-Light) misc 1 each Daily. 1 each 0     mupirocin (BACTROBAN) 2 % nasal ointment APPLY OINTMENT TOPICALLY TO AFFECTED AREA TWICE DAILY       nitroglycerin (NITROSTAT) 0.4 MG SL tablet Place 1 tablet under the tongue Every 5 (Five) Minutes As Needed for Chest Pain. Take no more than 3 doses in 15 minutes. 9 tablet 12     nystatin (MYCOSTATIN) 944514 UNIT/GM powder Apply  topically to the appropriate area as directed 2 (Two) Times a Day. 60 g 5     Probiotic Product (Align) 4 MG capsule Take 1 each by mouth Daily.       Vibegron (Gemtesa) 75 MG tablet Take 1 tablet by mouth Daily. 14 tablet 0     Xiidra 5 % ophthalmic solution Administer 1 drop to both eyes 2 (Two) Times a Day.        Allergies   Allergen Reactions    Vancomycin Other (See Comments)     Kidney failure per last admission     Family History   Problem Relation Age of Onset    Coronary artery disease Mother     Diabetes Mother     Hyperlipidemia Mother     Cancer Father      Social History     Socioeconomic History    Marital status:     Number of children: 2   Tobacco Use    Smoking status: Never     Passive exposure: Past    Smokeless tobacco: Never   Vaping Use    Vaping status: Never Used   Substance and Sexual Activity    Alcohol use: Not Currently     Comment: every 2-3 months    Drug use: No    Sexual activity: Not Currently     Partners: Female         Review of Systems  NO fever or chills  No hematuria    Objective     Blood pressure 91/62, pulse 86, temperature 96.8 °F (36 °C), temperature source Bladder, resp. rate 16, height 190 cm (74.8\"), weight 77 kg (169 lb 12.1 oz), SpO2 100%.  BP 91/62   Pulse 86   Temp 96.8 °F (36 °C) (Bladder)   Resp 16   Ht 190 cm (74.8\")   Wt 77 kg (169 lb 12.1 oz)   SpO2 100% "   BMI 21.33 kg/m²     Physicial Exam    General: WDWN male in NAD, sitting up in chair  Back: No CVAT, spine linear and non-tender   Abdomen: Soft and non-tender with no masses, organomegaly or guarding.  Neuro: Nonfocal        Imaging  none    Labs  Urine Microscopy:   Results from last 7 days   Lab Units 04/24/24  1756   RBC UA /HPF 6-10*   WBC UA /HPF Too Numerous to Count*   , Urinalysis:   Results from last 7 days   Lab Units 04/24/24  1756   PH, URINE  5.5   PROTEIN UA  30 mg/dL (1+)*   GLUCOSE UA  Negative   KETONES UA  Negative   , BMP:   Results from last 7 days   Lab Units 04/25/24  1441   SODIUM mmol/L 140   POTASSIUM mmol/L 4.5   CALCIUM mg/dL 8.4*   CHLORIDE mmol/L 106   CO2 mmol/L 13.0*   BUN mg/dL 77*   CREATININE mg/dL 2.07*   ALBUMIN g/dL 3.3*   BILIRUBIN mg/dL 0.6   ALK PHOS U/L 152*   , and CBC:   Results from last 7 days   Lab Units 04/25/24  1441   WBC 10*3/mm3 9.14   RBC 10*6/mm3 3.20*   HEMOGLOBIN g/dL 9.6*   HEMATOCRIT % 30.8*   PLATELETS 10*3/mm3 169       Sage Soto MD - 4/25/2024, 21:18 EDT

## 2024-04-26 NOTE — SIGNIFICANT NOTE
I was able to have a conversation with the patient's wife and daughter.  The patient was successfully extubated however he has had some subsequent desaturations and instability.  He also has nauseated which has made it difficult to utilize BiPAP.  After discussion, the decision was made to continue on with current treatment as before however in the event of respiratory failure we would not plan to reintubate the patient.  We will continue on with current medical therapies but I am also going to add morphine for pain and air hunger on an as-needed basis as well as small amounts of Versed for anxiety.  In addition, I would like to go ahead and cover for possible aspiration with antimicrobial therapy.  Orders have been placed as appropriate.

## 2024-04-27 ENCOUNTER — APPOINTMENT (OUTPATIENT)
Dept: GENERAL RADIOLOGY | Facility: HOSPITAL | Age: 79
End: 2024-04-27
Payer: MEDICARE

## 2024-04-27 LAB
ALBUMIN SERPL-MCNC: 3 G/DL (ref 3.5–5.2)
ALBUMIN/GLOB SERPL: 1.3 G/DL
ALP SERPL-CCNC: 107 U/L (ref 39–117)
ALT SERPL W P-5'-P-CCNC: 626 U/L (ref 1–41)
ANION GAP SERPL CALCULATED.3IONS-SCNC: 14 MMOL/L (ref 5–15)
AST SERPL-CCNC: 117 U/L (ref 1–40)
BASOPHILS # BLD AUTO: 0.01 10*3/MM3 (ref 0–0.2)
BASOPHILS NFR BLD AUTO: 0.1 % (ref 0–1.5)
BILIRUB SERPL-MCNC: 0.6 MG/DL (ref 0–1.2)
BUN SERPL-MCNC: 82 MG/DL (ref 8–23)
BUN/CREAT SERPL: 34.6 (ref 7–25)
CALCIUM SPEC-SCNC: 8 MG/DL (ref 8.6–10.5)
CHLORIDE SERPL-SCNC: 110 MMOL/L (ref 98–107)
CO2 SERPL-SCNC: 18 MMOL/L (ref 22–29)
CREAT SERPL-MCNC: 2.37 MG/DL (ref 0.76–1.27)
DEPRECATED RDW RBC AUTO: 44.2 FL (ref 37–54)
EGFRCR SERPLBLD CKD-EPI 2021: 27.2 ML/MIN/1.73
EOSINOPHIL # BLD AUTO: 0 10*3/MM3 (ref 0–0.4)
EOSINOPHIL NFR BLD AUTO: 0 % (ref 0.3–6.2)
ERYTHROCYTE [DISTWIDTH] IN BLOOD BY AUTOMATED COUNT: 13.6 % (ref 12.3–15.4)
GLOBULIN UR ELPH-MCNC: 2.3 GM/DL
GLUCOSE BLDC GLUCOMTR-MCNC: 138 MG/DL (ref 70–130)
GLUCOSE BLDC GLUCOMTR-MCNC: 141 MG/DL (ref 70–130)
GLUCOSE BLDC GLUCOMTR-MCNC: 152 MG/DL (ref 70–130)
GLUCOSE BLDC GLUCOMTR-MCNC: 173 MG/DL (ref 70–130)
GLUCOSE SERPL-MCNC: 142 MG/DL (ref 65–99)
HCT VFR BLD AUTO: 23.4 % (ref 37.5–51)
HGB BLD-MCNC: 7.8 G/DL (ref 13–17.7)
IMM GRANULOCYTES # BLD AUTO: 0.03 10*3/MM3 (ref 0–0.05)
IMM GRANULOCYTES NFR BLD AUTO: 0.3 % (ref 0–0.5)
LYMPHOCYTES # BLD AUTO: 0.51 10*3/MM3 (ref 0.7–3.1)
LYMPHOCYTES NFR BLD AUTO: 5.2 % (ref 19.6–45.3)
MAGNESIUM SERPL-MCNC: 2.9 MG/DL (ref 1.6–2.4)
MCH RBC QN AUTO: 29.8 PG (ref 26.6–33)
MCHC RBC AUTO-ENTMCNC: 33.3 G/DL (ref 31.5–35.7)
MCV RBC AUTO: 89.3 FL (ref 79–97)
MONOCYTES # BLD AUTO: 0.96 10*3/MM3 (ref 0.1–0.9)
MONOCYTES NFR BLD AUTO: 9.7 % (ref 5–12)
NEUTROPHILS NFR BLD AUTO: 8.38 10*3/MM3 (ref 1.7–7)
NEUTROPHILS NFR BLD AUTO: 84.7 % (ref 42.7–76)
NRBC BLD AUTO-RTO: 0 /100 WBC (ref 0–0.2)
PLATELET # BLD AUTO: 150 10*3/MM3 (ref 140–450)
PMV BLD AUTO: 11.5 FL (ref 6–12)
POTASSIUM SERPL-SCNC: 4.7 MMOL/L (ref 3.5–5.2)
PROT SERPL-MCNC: 5.3 G/DL (ref 6–8.5)
QT INTERVAL: 554 MS
QTC INTERVAL: 571 MS
RBC # BLD AUTO: 2.62 10*6/MM3 (ref 4.14–5.8)
SODIUM SERPL-SCNC: 142 MMOL/L (ref 136–145)
WBC NRBC COR # BLD AUTO: 9.89 10*3/MM3 (ref 3.4–10.8)

## 2024-04-27 PROCEDURE — 25010000002 AMIODARONE IN DEXTROSE 5% 360-4.14 MG/200ML-% SOLUTION: Performed by: NURSE PRACTITIONER

## 2024-04-27 PROCEDURE — 25010000002 PIPERACILLIN SOD-TAZOBACTAM PER 1 G: Performed by: INTERNAL MEDICINE

## 2024-04-27 PROCEDURE — 94799 UNLISTED PULMONARY SVC/PX: CPT

## 2024-04-27 PROCEDURE — 80053 COMPREHEN METABOLIC PANEL: CPT | Performed by: INTERNAL MEDICINE

## 2024-04-27 PROCEDURE — 83735 ASSAY OF MAGNESIUM: CPT | Performed by: INTERNAL MEDICINE

## 2024-04-27 PROCEDURE — 82948 REAGENT STRIP/BLOOD GLUCOSE: CPT

## 2024-04-27 PROCEDURE — 99291 CRITICAL CARE FIRST HOUR: CPT | Performed by: INTERNAL MEDICINE

## 2024-04-27 PROCEDURE — 99232 SBSQ HOSP IP/OBS MODERATE 35: CPT | Performed by: INTERNAL MEDICINE

## 2024-04-27 PROCEDURE — 63710000001 INSULIN REGULAR HUMAN PER 5 UNITS

## 2024-04-27 PROCEDURE — 94640 AIRWAY INHALATION TREATMENT: CPT

## 2024-04-27 PROCEDURE — 85025 COMPLETE CBC W/AUTO DIFF WBC: CPT | Performed by: INTERNAL MEDICINE

## 2024-04-27 PROCEDURE — 71045 X-RAY EXAM CHEST 1 VIEW: CPT

## 2024-04-27 PROCEDURE — 63710000001 INSULIN GLARGINE PER 5 UNITS: Performed by: FAMILY MEDICINE

## 2024-04-27 PROCEDURE — 93010 ELECTROCARDIOGRAM REPORT: CPT | Performed by: INTERNAL MEDICINE

## 2024-04-27 PROCEDURE — 25010000002 MORPHINE PER 10 MG: Performed by: INTERNAL MEDICINE

## 2024-04-27 PROCEDURE — 93005 ELECTROCARDIOGRAM TRACING: CPT | Performed by: INTERNAL MEDICINE

## 2024-04-27 RX ORDER — ALBUTEROL SULFATE 2.5 MG/3ML
2.5 SOLUTION RESPIRATORY (INHALATION) 2 TIMES DAILY PRN
Status: DISCONTINUED | OUTPATIENT
Start: 2024-04-27 | End: 2024-05-08 | Stop reason: HOSPADM

## 2024-04-27 RX ORDER — ACETYLCYSTEINE 200 MG/ML
3 SOLUTION ORAL; RESPIRATORY (INHALATION)
Status: DISCONTINUED | OUTPATIENT
Start: 2024-04-27 | End: 2024-04-30

## 2024-04-27 RX ADMIN — INSULIN GLARGINE 8 UNITS: 100 INJECTION, SOLUTION SUBCUTANEOUS at 20:55

## 2024-04-27 RX ADMIN — AMIODARONE HYDROCHLORIDE 0.5 MG/MIN: 1.8 INJECTION, SOLUTION INTRAVENOUS at 19:44

## 2024-04-27 RX ADMIN — Medication 10 ML: at 20:55

## 2024-04-27 RX ADMIN — PIPERACILLIN AND TAZOBACTAM 3.38 G: 3; .375 INJECTION, POWDER, LYOPHILIZED, FOR SOLUTION INTRAVENOUS at 21:40

## 2024-04-27 RX ADMIN — ALBUTEROL SULFATE 2.5 MG: 2.5 SOLUTION RESPIRATORY (INHALATION) at 20:20

## 2024-04-27 RX ADMIN — ACETYLCYSTEINE 3 ML: 200 SOLUTION ORAL; RESPIRATORY (INHALATION) at 11:40

## 2024-04-27 RX ADMIN — MORPHINE SULFATE 1 MG: 2 INJECTION, SOLUTION INTRAMUSCULAR; INTRAVENOUS at 19:44

## 2024-04-27 RX ADMIN — ALBUTEROL SULFATE 2.5 MG: 2.5 SOLUTION RESPIRATORY (INHALATION) at 11:40

## 2024-04-27 RX ADMIN — AMIODARONE HYDROCHLORIDE 0.5 MG/MIN: 1.8 INJECTION, SOLUTION INTRAVENOUS at 07:09

## 2024-04-27 RX ADMIN — PANTOPRAZOLE SODIUM 40 MG: 40 INJECTION, POWDER, FOR SOLUTION INTRAVENOUS at 05:55

## 2024-04-27 RX ADMIN — PIPERACILLIN AND TAZOBACTAM 3.38 G: 3; .375 INJECTION, POWDER, LYOPHILIZED, FOR SOLUTION INTRAVENOUS at 14:56

## 2024-04-27 RX ADMIN — PIPERACILLIN AND TAZOBACTAM 3.38 G: 3; .375 INJECTION, POWDER, LYOPHILIZED, FOR SOLUTION INTRAVENOUS at 05:55

## 2024-04-27 RX ADMIN — ACETYLCYSTEINE 3 ML: 200 SOLUTION ORAL; RESPIRATORY (INHALATION) at 20:20

## 2024-04-27 RX ADMIN — NOREPINEPHRINE BITARTRATE 0.04 MCG/KG/MIN: 0.03 INJECTION, SOLUTION INTRAVENOUS at 02:19

## 2024-04-27 RX ADMIN — AZELASTINE HYDROCHLORIDE 2 SPRAY: 137 SPRAY, METERED NASAL at 21:43

## 2024-04-27 RX ADMIN — MORPHINE SULFATE 1 MG: 2 INJECTION, SOLUTION INTRAMUSCULAR; INTRAVENOUS at 16:11

## 2024-04-27 RX ADMIN — INSULIN HUMAN 2 UNITS: 100 INJECTION, SOLUTION PARENTERAL at 00:12

## 2024-04-27 RX ADMIN — INSULIN HUMAN 2 UNITS: 100 INJECTION, SOLUTION PARENTERAL at 18:04

## 2024-04-27 RX ADMIN — Medication 10 ML: at 08:26

## 2024-04-27 NOTE — PROGRESS NOTES
Jermaine JIMENES Francisco  [id number]  1945   LOS: 4 days   Patient Care Team:  Marysol Shrestha MD as PCP - General (Internal Medicine)  Cooper Apodaca MD as Consulting Physician (Cardiology)    Chief Complaint:  IHD / CARDIAC ARREST / SSS / T2 DM / STAGE IV CKD / SEVERE ANEMIA / HFrEF / CARDIOGENIC SHOCK / ABNORMAL LFTs    Subjective     Extubated yesterday but with continued mild to moderate respiratory distress with decision to pursue DNI with low-dose IV morphine for air hunger as well as IV midazolam for anxiety.  Mildly sedated and comfortable currently on 4 L/min nasal cannula (oximetry 100%).  No posturing or overt seizure activity.  Intermittent nonproductive cough.    Objective     Vital Sign Min/Max for last 24 hours  Temp  Min: 98.1 °F (36.7 °C)  Max: 99.5 °F (37.5 °C)   BP  Min: 79/51  Max: 139/57   Pulse  Min: 59  Max: 81   Resp  Min: 8  Max: 22   SpO2  Min: 73 %  Max: 100 %      Weight  Min: 81.2 kg (179 lb 0.2 oz)  Max: 81.2 kg (179 lb 0.2 oz)         04/26/24  0516 04/27/24  0400   Weight: 81.5 kg (179 lb 10.8 oz) 81.2 kg (179 lb 0.2 oz)         Intake/Output Summary (Last 24 hours) at 4/27/2024 0716  Last data filed at 4/27/2024 0600  Gross per 24 hour   Intake 1106.27 ml   Output 855 ml   Net 251.27 ml       Physical Exam:     General Appearance:  Lethargic, restrained, in no acute distress   Lungs:   Gatter rhonchi and wheezes without dullness or confluent crackles,respirations regular, even and  unlabored    Heart:    Regular and normal rate, normal S1 and S2, grade 2/6 systolic murmur, no gallop, no rub, no click   Abdomen:  Extremities:   Soft, nontender, bowel sounds audible x4  1+ generalized edema, normal range of motion   Pulses:   Pulses palpable and equal bilaterally      Results Review:   Results from last 7 days   Lab Units 04/27/24  0607 04/26/24  0410 04/25/24  1441   SODIUM mmol/L 142 137 140   POTASSIUM mmol/L 4.7 4.8 4.5   CHLORIDE mmol/L 110* 106 106   CO2 mmol/L 18.0* 16.0*  13.0*   BUN mg/dL 82* 78* 77*   CREATININE mg/dL 2.37* 2.28* 2.07*   GLUCOSE mg/dL 142* 188* 293*   CALCIUM mg/dL 8.0* 7.9* 8.4*     Results from last 7 days   Lab Units 04/27/24  0607 04/26/24  1230 04/26/24  0410 04/25/24  1441   WBC 10*3/mm3 9.89  --  9.95 9.14   HEMOGLOBIN g/dL 7.8* 8.2* 7.9* 9.6*   HEMATOCRIT % 23.4* 24.3* 23.7* 30.8*   PLATELETS 10*3/mm3 150  --  158 169     Results from last 7 days   Lab Units 04/23/24  0550   HEMOGLOBIN A1C % 8.50*     Results from last 7 days   Lab Units 04/22/24  1713 04/22/24  1506   HSTROP T ng/L 119* 123*     MAGNESIUM: 2.9  ALBUMIN: 3.0  AST: 117  ALT: 626  IRON SATURATION: 7%  CXR:    Findings:    There are postoperative changes from midline sternotomy and coronary artery bypass grafting. There is a left-sided subclavian pacemaker device. Pulmonary vascular markings are normal. There is patchy right upper lobe airspace disease. The left lung is   clear.     IMPRESSION:    Mild right upper lobe airspace disease which could be from pneumonia.    EKG:    Electronic ventricular pacemaker  When compared with ECG of 26-APR-2024 05:17,  Vent. rate has decreased BY  14 BPM    Medication Review: REVIEWED; DRIPS:    IV norepinephrine 0.02 mcg/kilogram/minute continuous infusion  IV amiodarone 0.5 mg/minute continuous infusion    Assessment & Plan     Persistent atrial fibrillation with ventricular paced rhythm and no recurrent VT.  Marginal renal function but acceptable urinary output.  Fairly stable respiratory status with improved chest x-ray per my review.  Would continue current supportive care.  Prognosis remains quite guarded in view of severe ischemic LV dysfunction and inability to pursue significant guideline directed medical therapy at this time for advanced ischemic cardiomyopathy/CHF.  Will continue IV amiodarone additional 24 hours.    Discussed with patient, wife, and RN.      Cardiac arrest    Type 2 diabetes mellitus with hyperglycemia    Hyperlipidemia LDL goal  <70    Hypothyroidism (acquired)    Coronary artery disease involving native coronary artery of native heart with angina pectoris    Pacemaker    HFrEF (heart failure with reduced ejection fraction)    Anemia, chronic disease    Stage 3b chronic kidney disease    UTI (urinary tract infection)    Acute respiratory failure with hypoxia    Elevated troponin level not due myocardial infarction    04/27/24  07:16 EDT

## 2024-04-27 NOTE — PROGRESS NOTES
INFECTIOUS DISEASE Progress Note    Jermaine Singh  1945  [id number]    Admission Date: 4/22/2024      Requesting Provider: No ref. provider found  Evaluating Physician: Lincoln Padilla MD    Reason for Consultation:  UTI    History of present illness:    4/23/24: Patient is a 79 y.o. male  with a history of type 2 diabetes mellitus, peripheral neuropathy, stage III chronic kidney disease, bilateral DVTs, paroxysmal atrial fibrillation, sick sinus syndrome status post pacemaker placement, CHF, peripheral vascular disease, and left diabetic foot infection who is seen today for evaluation of UTI.  He had a history of UTI last month with urine cultures and early March growing yeast.  He was apparently treated with an antifungal agent.    Over the last few days he noted the onset of profound weakness and fatigue.  He also noticed some dysuria.  He was brought to the emergency room and found to have pyuria and bacteriuria with leukocytosis.   Urine culture was sent and he was started on ceftriaxone.   His white blood cell count today is 11.8.   He was noted to have a blister over his right fifth metatarsal head and a chronic right pretibial wound.    4/24/24: He remains afebrile.  His urine culture was finalized as negative.  I have asked the laboratory to hold the urine culture for yeast but they did not and it was disposed of last night.   He states that his dysuria is improved today.  Feels better.     4/25/2024:  He remains afebrile.  White blood cell count today is 6.7.  Creatinine is 1.99.   Urinalysis yesterday revealed too numerous to count white blood cells.  He denied dysuria this morning.  After I saw him he suffered a V-fib cardiac arrest and was transferred to the ICU.  He is endotracheally intubated    4/26/24:   His maximum temperature over the last 24 hours is 99.5.  Repeat urine culture from 4/24 is pending.  He remains on ventilatory support.    Creatinine is 2.28.  ALT is  1028.  White  blood cell count is 9.95.  O2 saturation is 100% on an FiO2 of 30%.  Chest x-ray reveals no pulmonary infiltrates. \    Later this morning he was extubated.  He is confused.  He knows his name and his birthdate.  He cannot answer more complex questions.    4/27/2024:  His maximum temperature over the last 24 hours is 99.5.  His left ventricular ejection fraction is less than 20%.  His white blood cell count is 2.37.  ALT is down to 626.   Chest x-ray 4/27 reveals moderate hemoglobin airspace disease.   O2 saturation is 99% on 4 L.   He is on Zosyn therapy.   Later today his oxygen demand increased and he was placed   On BiPAP.     Past Medical History:   Diagnosis Date    Allergic     Arthritis     Asthma     Coronary artery disease     Diabetes mellitus 2000    started on inuslin 12/2018; started on po meds in 2000; checking blood sugars daily     Diabetic foot infection 07/23/2023    Disease of thyroid gland     po meds daily for hypothyroidism     Elevated serum creatinine 11/15/2022    Elevated troponin 10/02/2022    Generalized weakness 11/15/2022    History of fracture as a child     rt leg- severe     Hyperlipidemia     Hypertension     Hypothyroidism     PAF (paroxysmal atrial fibrillation) 07/23/2023    Peripheral neuropathy     Peripheral vascular disease     s/p angiogram 2/19-needs stent in left leg     Pleural effusion, bilateral 11/15/2022    Proximal phalanx fracture of the second digit extending into the second metatarsal joint 07/28/2022    RBBB     Right knee pain     Status post amputation of great toe and second toe of left foot 07/23/2023    Status post amputation of great toe, left 11/15/2022    Unstable angina 02/12/2019    Added automatically from request for surgery [id number]    Vitamin D deficiency        Past Surgical History:   Procedure Laterality Date    AMPUTATION DIGIT Left 01/17/2023    Procedure: AMPUTATION DIGIT LEFT;  Surgeon: Gopal Márquez MD;  Location: [facility] OR;  Service:  Vascular;  Laterality: Left;    APPENDECTOMY      CARDIAC CATHETERIZATION N/A 02/14/2019    Procedure: Left Heart Cath;  Surgeon: Cooper Apodaca MD;  Location:  [facility];  Service: Cardiology    CARDIAC ELECTROPHYSIOLOGY PROCEDURE N/A 05/18/2022    Procedure: DEVICE IMPLANT;  Surgeon: Cooper Apodaca MD;  Location:  [facility];  Service: Cardiology;  Laterality: N/A;    CARDIAC SURGERY      COLONOSCOPY      CORONARY ARTERY BYPASS GRAFT N/A 03/22/2019    Procedure: MEDIAN STERNOTOMY, CORONARY ARTERY BYPASS GRAFT X3, UTILIZING THE LEFT INTERNAL MAMMARY ARTERY, EVH AND OPEN HARVEST OF THE RIGHT GREATER SAPHENOUS VEIN, EXPLORATION OF THE LEFT LEG;  Surgeon: Ap Au MD;  Location:  [facility] OR;  Service: Cardiothoracic    EYE SURGERY Bilateral     cataracts     FRACTURE SURGERY      INTERVENTIONAL RADIOLOGY PROCEDURE N/A 07/29/2021    Procedure: Abdominal Aortagram with Runoff;  Surgeon: Jermaine Hernandez MD;  Location:  [facility];  Service: Cardiovascular;  Laterality: N/A;    KNEE ARTHROSCOPY      right x 2, left x 1    LACERATION REPAIR      right leg    LEG SURGERY      2 for fracture of rt leg     TONSILLECTOMY      Adnoidectomy    TRANS METATARSAL AMPUTATION Left 08/02/2022    Procedure: GREAT TOE AMPUTATION LEFT;  Surgeon: Gopal Márquez MD;  Location:  [facility] OR;  Service: Vascular;  Laterality: Left;       Family History   Problem Relation Age of Onset    Coronary artery disease Mother     Diabetes Mother     Hyperlipidemia Mother     Cancer Father        Social History     Socioeconomic History    Marital status:     Number of children: 2   Tobacco Use    Smoking status: Never     Passive exposure: Past    Smokeless tobacco: Never   Vaping Use    Vaping status: Never Used   Substance and Sexual Activity    Alcohol use: Not Currently     Comment: every 2-3 months    Drug use: No    Sexual activity: Not Currently     Partners:  Female       Allergies   Allergen Reactions    Vancomycin Other (See Comments)     Kidney failure per last admission         Medication:    Current Facility-Administered Medications:     acetaminophen (TYLENOL) tablet 650 mg, 650 mg, Oral, Q6H PRN, Maricel Peraza APRN, 650 mg at 04/25/24 0940    amiodarone 360 mg in 200 mL D5W infusion, 0.5 mg/min, Intravenous, Continuous, Suha Bradshaw APRN, Last Rate: 16.67 mL/hr at 04/27/24 0709, 0.5 mg/min at 04/27/24 0709    [Held by provider] apixaban (ELIQUIS) tablet 5 mg, 5 mg, Oral, Q12H, Maricel Peraza APRN, 5 mg at 04/25/24 0840    aspirin chewable tablet 81 mg, 81 mg, Oral, Daily, Maricel Peraza APRN, 81 mg at 04/25/24 0840    [Held by provider] atorvastatin (LIPITOR) tablet 20 mg, 20 mg, Oral, Nightly, Maricel Peraza APRN, 20 mg at 04/24/24 2100    azelastine (ASTELIN) nasal spray 2 spray, 2 spray, Each Nare, BID, Maricel Peraza APRN    sennosides-docusate (PERICOLACE) 8.6-50 MG per tablet 2 tablet, 2 tablet, Oral, BID PRN **AND** polyethylene glycol (MIRALAX) packet 17 g, 17 g, Oral, Daily PRN, 17 g at 04/22/24 2225 **AND** [DISCONTINUED] bisacodyl (DULCOLAX) EC tablet 5 mg, 5 mg, Oral, Daily PRN, 5 mg at 04/22/24 2225 **AND** bisacodyl (DULCOLAX) suppository 10 mg, 10 mg, Rectal, Daily PRN, Maricel Peraza APRN    [Held by provider] carvedilol (COREG) tablet 3.125 mg, 3.125 mg, Oral, BID With Meals, Maricel Peraza APRN, 3.125 mg at 04/22/24 2214    dextrose (D50W) (25 g/50 mL) IV injection 25 g, 25 g, Intravenous, Q15 Min PRN, Maricel Peraza, APRN    EPINEPHrine 10 mg in 250 mL infusion, 0.02-0.3 mcg/kg/min, Intravenous, Titrated, Eyad Ledesma MD, Stopped at 04/25/24 1925    famotidine (PEPCID) tablet 20 mg, 20 mg, Oral, BID PRN, Maricel Peraza APRN, 20 mg at 04/22/24 2214    glucagon (GLUCAGEN) injection 1 mg, 1 mg, Intramuscular, Q15 Min PRN, Maricel Peraza, APRN    insulin glargine  (LANTUS, SEMGLEE) injection 8 Units, 8 Units, Subcutaneous, Nightly, Luz Elena Batista DO, 8 Units at 04/26/24 2056    insulin regular (humuLIN R,novoLIN R) injection 2-9 Units, 2-9 Units, Subcutaneous, Q6H, Sarah Moreland, PharmD, 2 Units at 04/27/24 0012    [Held by provider] isosorbide mononitrate (IMDUR) 24 hr tablet 30 mg, 30 mg, Oral, Daily, Maricel Peraza, APRN    lactobacillus acidophilus (RISAQUAD) capsule 1 capsule, 1 capsule, Oral, Daily, Maricel Peraza APRN, 1 capsule at 04/25/24 0840    levothyroxine (SYNTHROID, LEVOTHROID) tablet 88 mcg, 88 mcg, Oral, Q AM, Maricel Peraza APRN, 88 mcg at 04/25/24 0633    [Held by provider] losartan (COZAAR) tablet 50 mg, 50 mg, Oral, Daily, Maricel Peraza APRN    Magnesium Low Dose Replacement - Follow Nurse / BPA Driven Protocol, , Does not apply, PRN, Maricel Peraza APRN    midazolam (VERSED) injection 0.5 mg, 0.5 mg, Intravenous, Q4H PRN, Eyad Ledesma MD    midodrine (PROAMATINE) tablet 5 mg, 5 mg, Oral, TID AC, Lela Giordano, APRN, 5 mg at 04/26/24 1214    morphine injection 1 mg, 1 mg, Intravenous, Q3H PRN, Eyad Ledesma MD, 1 mg at 04/26/24 2146    nitroglycerin (NITROSTAT) SL tablet 0.4 mg, 0.4 mg, Sublingual, Q5 Min PRN, aMricel Peraza APRN    norepinephrine (LEVOPHED) 8 mg in 250 mL NS infusion (premix), 0.02-0.3 mcg/kg/min, Intravenous, Titrated, Eyad Ledesma MD, Last Rate: 2.91 mL/hr at 04/27/24 0556, 0.02 mcg/kg/min at 04/27/24 0556    pantoprazole (PROTONIX) injection 40 mg, 40 mg, Intravenous, Q AM, Sarah Moreland, PharmD, 40 mg at 04/27/24 0555    phenylephrine (JENNIFER-SYNEPHRINE) 50 mg in sodium chloride 0.9 % 250 mL infusion, 0.5-3 mcg/kg/min, Intravenous, Titrated, Eyad Ledesma MD, Stopped at 04/25/24 1547    piperacillin-tazobactam (ZOSYN) 3.375 g IVPB in 100 mL NS MBP (CD), 3.375 g, Intravenous, Q8H, Eyad Ledesma MD, 3.375 g at 04/27/24 0555    polyethylene glycol (MIRALAX)  packet 17 g, 17 g, Oral, Daily, Artur Avila MD, 17 g at 24 0901    prochlorperazine (COMPAZINE) injection 5 mg, 5 mg, Intravenous, Q6H PRN, Maricel Perzaa W, APRN, 2.5 mg at 24 1316    sodium chloride 0.9 % flush 10 mL, 10 mL, Intravenous, PRN, Stu Maricel W, APRN    sodium chloride 0.9 % flush 10 mL, 10 mL, Intravenous, Q12H, Sut Maricel W, APRN, 10 mL at 24 2056    tamsulosin (FLOMAX) 24 hr capsule 0.4 mg, 0.4 mg, Oral, Daily, Maricel Peraza, APRN, 0.4 mg at 24 0840    Antibiotics:  Anti-Infectives (From admission, onward)      Ordered     Dose/Rate Route Frequency Start Stop    24 1356  piperacillin-tazobactam (ZOSYN) 3.375 g IVPB in 100 mL NS MBP (CD)        Ordering Provider: Eyad Ledesma MD    3.375 g  over 4 Hours Intravenous Every 8 Hours 24 2200 24 2159    24 1356  piperacillin-tazobactam (ZOSYN) 3.375 g IVPB in 100 mL NS MBP (CD)        Ordering Provider: Eyad Ledesma MD    3.375 g  over 30 Minutes Intravenous Once 24 1445 24 1557              Review of Systems:  See HPI      Physical Exam:   Vital Signs  Temp (24hrs), Av.7 °F (37.1 °C), Min:98.1 °F (36.7 °C), Max:99.5 °F (37.5 °C)    Temp  Min: 98.1 °F (36.7 °C)  Max: 99.5 °F (37.5 °C)  BP  Min: 79/51  Max: 139/57  Pulse  Min: 59  Max: 81  Resp  Min: 8  Max: 22  SpO2  Min: 73 %  Max: 100 %    GENERAL:   Lethargic and debilitated appearing  HEENT: Normocephalic, atraumatic.  PERRL. EOMI. No conjunctival injection. No icterus.  No labial ulcers  NECK: Supple   HEART:  irregular rate  LUNGS:   ABDOMEN: Soft, nontender, nondistended. No rebound or guarding. NO mass or HSM.  EXT:  No cyanosis, clubbing or edema. No cord.  :   Fonseca catheter in place.  MSK: No joint effusions or erythema  SKIN: Warm and dry without cutaneous eruptions on Inspection/palpation.    NEURO:    More lethargic.    Laboratory Data    Results from last 7 days   Lab Units  "04/27/24  0607 04/26/24  1230 04/26/24  0410 04/25/24  1441   WBC 10*3/mm3 9.89  --  9.95 9.14   HEMOGLOBIN g/dL 7.8* 8.2* 7.9* 9.6*   HEMATOCRIT % 23.4* 24.3* 23.7* 30.8*   PLATELETS 10*3/mm3 150  --  158 169     Results from last 7 days   Lab Units 04/27/24  0607   SODIUM mmol/L 142   POTASSIUM mmol/L 4.7   CHLORIDE mmol/L 110*   CO2 mmol/L 18.0*   BUN mg/dL 82*   CREATININE mg/dL 2.37*   GLUCOSE mg/dL 142*   CALCIUM mg/dL 8.0*     Results from last 7 days   Lab Units 04/27/24  0607   ALK PHOS U/L 107   BILIRUBIN mg/dL 0.6   ALT (SGPT) U/L 626*   AST (SGOT) U/L 117*                         Estimated Creatinine Clearance: 29 mL/min (A) (by C-G formula based on SCr of 2.37 mg/dL (H)).      Microbiology:  No results found for: \"ACANTHNAEG\", \"AFBCX\", \"BPERTUSSISCX\", \"BLOODCX\"  No results found for: \"BCIDPCR\", \"CXREFLEX\", \"CSFCX\", \"CULTURETIS\"  No results found for: \"CULTURES\", \"HSVCX\", \"URCX\"  No results found for: \"EYECULTURE\", \"GCCX\", \"HSVCULTURE\", \"LABHSV\"  No results found for: \"LEGIONELLA\", \"MRSACX\", \"MUMPSCX\", \"MYCOPLASCX\"  No results found for: \"NOCARDIACX\", \"STOOLCX\"  Urine Culture   Date Value Ref Range Status   04/22/2024 No growth  Preliminary     No results found for: \"VIRALCULTU\", \"WOUNDCX\"        Radiology:  Imaging Results (Last 72 Hours)       Procedure Component Value Units Date/Time    XR Chest 1 View [[id number]] Collected: 04/27/24 0209     Updated: 04/27/24 0214    Narrative:      XR CHEST 1 VW    Date of Exam: 4/27/2024 1:02 AM EDT    Indication: resp failure    Comparison: Chest radiograph dated April 26, 2024 1307 hours    Findings:  There are postoperative changes from midline sternotomy and coronary artery bypass grafting. There is a left-sided subclavian pacemaker device. Pulmonary vascular markings are normal. There is patchy right upper lobe airspace disease. The left lung is   clear.      Impression:      Impression:  Mild right upper lobe airspace disease which could be from " pneumonia.    Electronically Signed: Golden Sullivan MD    4/27/2024 2:11 AM EDT    Workstation ID: [id number]    XR Chest 1 View [[id number]] Collected: 04/26/24 1325     Updated: 04/26/24 1329    Narrative:      XR CHEST 1 VW    Date of Exam: 4/26/2024 12:48 PM EDT    Indication: resp failure    Comparison: Earlier the same day.    Findings:  Interval extubation. There is no new focal opacity. There is no enlarging effusion or distinct pneumothorax. Unchanged mild cardiac enlargement.      Impression:      Impression:  Interval extubation with remainder of exam unchanged      Electronically Signed: Christian Wang MD    4/26/2024 1:26 PM EDT    Workstation ID: [id number]    XR Chest 1 View [[id number]] Collected: 04/26/24 0542     Updated: 04/26/24 0546    Narrative:      XR CHEST 1 VW    Date of Exam: 4/26/2024 3:11 AM EDT    Indication: Respiratory failure, cardiac arrest    Comparison: 1 day prior.    Findings:  Support hardware projects unchanged. There is no new focal airspace opacity. No significant pleural effusion or distinct pneumothorax. Unchanged mild cardiac enlargement.      Impression:      Impression:  No significant interval change.      Electronically Signed: Christian Wang MD    4/26/2024 5:43 AM EDT    Workstation ID: [id number]    XR Chest 1 View [[id number]] Collected: 04/25/24 1517     Updated: 04/25/24 1522    Narrative:      XR CHEST 1 VW    Date of Exam: 4/25/2024 2:42 PM EDT    Indication: intubation    Comparison: 4/22/2024.    Findings:  Endotracheal tube projects in good position. Left chest wall ICD projects unchanged. There is no significant pleural effusion or distinct in the thorax. No new focal airspace consolidation. Some stable mild linear atelectasis is noted.      Impression:      Impression:  Endotracheal tube projects in good position. Left chest wall ICD projects unchanged. There is no significant pleural effusion or distinct in the thorax. No new focal airspace consolidation. Some  stable mild linear atelectasis is noted.        Electronically Signed: Christian Wnag MD    4/25/2024 3:19 PM EDT    Workstation ID: [id number]          I read his chest x-ray of 4/27      Impression:    1.    Right upper lobe aspiration pneumonia-he is now on Zosyn therapy   2.   V-fib arrest-status post resuscitation.  He is on amiodarone   3.  Right fifth metatarsal wound/bullous lesion-without evidence of cellulitis   4.  Chronic right pretibial dermatitis-without evidence of cellulitis  5.  Type 2 diabetes mellitus  6.  coronary artery disease/status post CABG, 3/22/2019  7.  Severe systolic heart failure-systolic function is severely impaired with an ejection fraction of less than 20%.  8.  Stage IIIb chronic kidney disease  9.  Paroxysmal atrial fibrillation  10.  Acute hypoxic respiratory failure- now requiring high flow oxygen  11.  Ischemic hepatitis  Improving  12.  Pyuria-urine fungal culture is pending.    PLAN/RECOMMENDATIONS:   1.  Offload the right fifth metatarsal wound  2.  Urine fungal culture-pending  3.  Intravenous Zosyn     I discussed his complex situation with the nursing staff and with his daughter today.   He is critically ill with a high mortality risk.   his overall prognosis appears poor.  He is now DNR/DNI.   I spent over 30 minutes on his care today.    Lincoln Padilla MD  4/27/2024  08:02 EDT

## 2024-04-27 NOTE — PROGRESS NOTES
INTENSIVIST   PROGRESS NOTE     Hospital:  LOS: 4 days     Mr. Jermaine Singh, 79 y.o. male is followed for a Chief Complaint of: Cardiac Arrest      Subjective   S     Interval History:  Extubated yesterday with marginal oxygenation. On 4L nasal cannula this AM. Difficulty managing respiratory secretions and required NT suctioning.        The patient's relevant past medical, surgical and social history were reviewed and updated in Epic as appropriate.      ROS:   Constitutional: Negative for fever.   Respiratory: Positive for dyspnea.   Cardiovascular: Negative for chest pain.   Gastrointestinal: Negative for  nausea, vomiting and diarrhea.     Objective   O     Vitals:  Temp  Min: 97.7 °F (36.5 °C)  Max: 99.5 °F (37.5 °C)  BP  Min: 79/51  Max: 139/57  Pulse  Min: 59  Max: 81  Resp  Min: 8  Max: 22  SpO2  Min: 73 %  Max: 100 % Flow (L/min)  Min: 2  Max: 6    Intake/Ouptut 24 hrs (7:00AM - 6:59 AM)  Intake & Output (last 3 days)         04/24 0701  04/25 0700 04/25 0701  04/26 0700 04/26 0701  04/27 0700 04/27 0701  04/28 0700    P.O.        I.V. (mL/kg)  1075 (13.2) 906.5 (11.2) 198.4 (2.4)    Other  10      IV Piggyback   199.8     Total Intake(mL/kg)  1085 (13.3) 1106.3 (13.6) 198.4 (2.4)    Urine (mL/kg/hr) 875 (0.5) 550 (0.3) 855 (0.4) 200 (0.6)    Emesis/NG output   0     Total Output 875 550 855 200    Net -875 +535 +251.3 -1.6            Urine Unmeasured Occurrence 1 x       Stool Unmeasured Occurrence 1 x       Emesis Unmeasured Occurrence   3 x             Medications (drips):  amiodarone, Last Rate: 0.5 mg/min (04/27/24 0709)  EPINEPHrine, Last Rate: Stopped (04/25/24 1925)  norepinephrine, Last Rate: 0.04 mcg/kg/min (04/27/24 0819)  phenylephrine, Last Rate: Stopped (04/25/24 1547)        Mechanical Ventilator Settings:          Resp Rate (Set): 16  Pressure Support (cm H2O): 10 cm H20  FiO2 (%): 30 %  PEEP/CPAP (cm H2O): 5 cm H20    Minute Ventilation (L/min) (Obs): 3.82 L/min  Resp Rate (Observed)  Vent: 16  I:E Ratio (Set): 1:2.75  I:E Ratio (Obs): 16.67:1    PIP Observed (cm H2O): 16 cm H2O  Plateau Pressure (cm H2O): (S) 14 cm H2O    Physical Examination  Telemetry:  Normal sinus rhythm.    Constitutional:  No acute distress.  Resting in bed on 4L nasal cannula.    Eyes: No scleral icterus.   PERRL, EOM intact.    Neck:  Supple, FROM   Cardiovascular: Normal rate, regular and rhythm. Normal heart sounds.  No murmurs, gallop or rub.   Respiratory: No respiratory distress. Normal respiratory effort.  Bilateral rhonchi.    Abdominal:  Soft. No masses. Nontender. No distension. No HSM.   Extremities: No digital cyanosis. No clubbing.  No peripheral edema.   Skin: No rashes, lesions or ulcers   Neurological:   Alert. Does not answer questions.              Results from last 7 days   Lab Units 04/27/24  0607 04/26/24  1230 04/26/24  0410 04/25/24  1441   WBC 10*3/mm3 9.89  --  9.95 9.14   HEMOGLOBIN g/dL 7.8* 8.2* 7.9* 9.6*   MCV fL 89.3  --  88.8 96.3   PLATELETS 10*3/mm3 150  --  158 169     Results from last 7 days   Lab Units 04/27/24  0607 04/26/24  0410 04/25/24  1441   SODIUM mmol/L 142 137 140   POTASSIUM mmol/L 4.7 4.8 4.5   CO2 mmol/L 18.0* 16.0* 13.0*   CREATININE mg/dL 2.37* 2.28* 2.07*   GLUCOSE mg/dL 142* 188* 293*   MAGNESIUM mg/dL 2.9* 2.9* 4.6*     Estimated Creatinine Clearance: 29 mL/min (A) (by C-G formula based on SCr of 2.37 mg/dL (H)).  Results from last 7 days   Lab Units 04/27/24  0607 04/26/24  0410 04/25/24  1441   ALK PHOS U/L 107 130* 152*   BILIRUBIN mg/dL 0.6 0.6 0.6   ALT (SGPT) U/L 626* 1,028* 454*   AST (SGOT) U/L 117* 787* 385*       Results from last 7 days   Lab Units 04/26/24  0313 04/25/24  1447   PH, ARTERIAL pH units 7.441 7.234*   PCO2, ARTERIAL mm Hg 24.6* 28.2*   PO2 ART mm Hg 118.0* 373.0*   FIO2 % 30 100       Images:  Imaging Results (Last 24 Hours)       Procedure Component Value Units Date/Time    XR Chest 1 View [[id number]] Collected: 04/27/24 0209     Updated:  04/27/24 0214    Narrative:      XR CHEST 1 VW    Date of Exam: 4/27/2024 1:02 AM EDT    Indication: resp failure    Comparison: Chest radiograph dated April 26, 2024 1307 hours    Findings:  There are postoperative changes from midline sternotomy and coronary artery bypass grafting. There is a left-sided subclavian pacemaker device. Pulmonary vascular markings are normal. There is patchy right upper lobe airspace disease. The left lung is   clear.      Impression:      Impression:  Mild right upper lobe airspace disease which could be from pneumonia.    Electronically Signed: Golden Sullivan MD    4/27/2024 2:11 AM EDT    Workstation ID: [id number]    XR Chest 1 View [[id number]] Collected: 04/26/24 1325     Updated: 04/26/24 1329    Narrative:      XR CHEST 1 VW    Date of Exam: 4/26/2024 12:48 PM EDT    Indication: resp failure    Comparison: Earlier the same day.    Findings:  Interval extubation. There is no new focal opacity. There is no enlarging effusion or distinct pneumothorax. Unchanged mild cardiac enlargement.      Impression:      Impression:  Interval extubation with remainder of exam unchanged      Electronically Signed: Christian Wang MD    4/26/2024 1:26 PM EDT    Workstation ID: [id number]               Results: Reviewed.  I reviewed the patient's new laboratory and imaging results.  I independently reviewed the patient's new images.    Medications: Reviewed.    Assessment & Plan   A / P     Mr. Singh is a 78yo M with a history of T2DM, Stage 3 CKD, bilateral DVTs, paroxysmal Afib, SSS s/p post-pacemaker placement, CHF, PVD, and left diabetic foot infection who presented to [facility] on 4/22/24 with weakness and a chronic wound on his RLE. UA was consistent with a UTI. He was started on Rocephin and admitted to Hospital Medicine.      On 4/25/24, he suffered a Vfib arrest and was intubated during CPR. He was transferred to the ICU. He was able to be extubated on 4/26/24. After extubation, his respiratory status  was tenuous and family decided to make him a DNR/DNI.     This AM, he is on 4L nasal cannula. He is having difficulty clearing secretions and has required NT suctioning. He is on Levophed and Amiodarone infusions.     ID is following for antibiotic management and he is currently on Zosyn.     Nutrition:   NPO Diet NPO Type: Strict NPO  Advance Directives:   Code Status and Medical Interventions:   Ordered at: 04/26/24 1353     Medical Intervention Limits:    NO intubation (DNI)    NO cardioversion     Level Of Support Discussed With:    Next of Kin (If No Surrogate)    Health Care Surrogate     Code Status (Patient has no pulse and is not breathing):    No CPR (Do Not Attempt to Resuscitate)     Medical Interventions (Patient has pulse or is breathing):    Limited Support       Active Hospital Problems    Diagnosis     **Cardiac arrest     Acute respiratory failure with hypoxia     Elevated troponin level not due myocardial infarction     UTI (urinary tract infection)     Anemia, chronic disease     Stage 3b chronic kidney disease     HFrEF (heart failure with reduced ejection fraction)     Pacemaker     Type 2 diabetes mellitus with hyperglycemia     Hyperlipidemia LDL goal <70     Hypothyroidism (acquired)     Coronary artery disease involving native coronary artery of native heart with angina pectoris        Assessment / Plan:    Continue ICU care. High risk for decline.   Titrate Levophed.   Add mucomyst nebs and continue incentive spirometry. NT suctioning as needed.   Speech evaluation.   Monitor renal function. BUN/Cr slightly worse and urine output is decreasing.   Amiodarone per Cardiology.   Mechanical DVT ppx.   AM labs and CXR    Remains critically ill secondary to ongoing shock requiring vasopressor titration.     High level of risk due to:  severe exacerbation of chronic illness and illness with threat to life or bodily function.    Plan of care and goals reviewed during interdisciplinary rounds.  I  discussed the patient's findings and my recommendations with patient, family, and nursing staff    Critical Care Time: was greater than 32 minutes.(This excludes time spent performing separately reportable procedures and services). including high complexity decision making to assess, manipulate, and support vital organ system failure in this individual who has impairment of one or more vital organ systems such that there is a high probability of imminent or life threatening deterioration in the patient’s condition.      Hailee Case, DO    Intensive Care Medicine and Pulmonary Medicine

## 2024-04-27 NOTE — PLAN OF CARE
Goal Outcome Evaluation:  Plan of Care Reviewed With: patient, spouse        Progress: improving  Outcome Evaluation:   -Continues on 4L NC with sats >90%  -Weak cough noted with increased secretions, required NT suctioning x1  -Continues on Levo @0.02, Amio @0.5  -c/o pain in chest with movement, PRN morphine utilized  -disoriented to situation only, much calmer and cooperative this shift  -decreased UOP, APRN made aware  -restraints continued for pt safety

## 2024-04-27 NOTE — PLAN OF CARE
Goal Outcome Evaluation:  Required increase O2 support. Increased to 6LNC, then NRB. Only satting 85-88% w/ NRB. Currently requiring BiPAP.  NT suction for secretions. Weak cough.   Arouses to voice. Answers orientation questions appropriately.  Plan for CT head once off BiPAP. Plan for SLP eval tomorrow.    mL. Providers aware.   Morphine administered x1 for rib pain.  -levo infusing @ 0.04 mcg/kg/min  -amio infusing @ 0.5 mg/min  Family updated at bedside.

## 2024-04-27 NOTE — SIGNIFICANT NOTE
04/27/24 1314   SLP Deferred Reason   SLP Deferred Reason Unable to evaluate, medical status change  (Consult received for dysphagia, pt now on bipap. RN request check back tomorrow.)

## 2024-04-28 ENCOUNTER — APPOINTMENT (OUTPATIENT)
Dept: GENERAL RADIOLOGY | Facility: HOSPITAL | Age: 79
End: 2024-04-28
Payer: MEDICARE

## 2024-04-28 ENCOUNTER — ANCILLARY PROCEDURE (OUTPATIENT)
Dept: SPEECH THERAPY | Facility: HOSPITAL | Age: 79
End: 2024-04-28
Payer: MEDICARE

## 2024-04-28 LAB
ALBUMIN SERPL-MCNC: 2.9 G/DL (ref 3.5–5.2)
ALBUMIN/GLOB SERPL: 1.1 G/DL
ALP SERPL-CCNC: 85 U/L (ref 39–117)
ALT SERPL W P-5'-P-CCNC: 450 U/L (ref 1–41)
ANION GAP SERPL CALCULATED.3IONS-SCNC: 16 MMOL/L (ref 5–15)
AST SERPL-CCNC: 76 U/L (ref 1–40)
BASOPHILS # BLD AUTO: 0.02 10*3/MM3 (ref 0–0.2)
BASOPHILS NFR BLD AUTO: 0.2 % (ref 0–1.5)
BILIRUB SERPL-MCNC: 0.6 MG/DL (ref 0–1.2)
BUN SERPL-MCNC: 84 MG/DL (ref 8–23)
BUN/CREAT SERPL: 31.7 (ref 7–25)
CALCIUM SPEC-SCNC: 8 MG/DL (ref 8.6–10.5)
CHLORIDE SERPL-SCNC: 108 MMOL/L (ref 98–107)
CO2 SERPL-SCNC: 17 MMOL/L (ref 22–29)
CREAT SERPL-MCNC: 2.65 MG/DL (ref 0.76–1.27)
DEPRECATED RDW RBC AUTO: 43.2 FL (ref 37–54)
EGFRCR SERPLBLD CKD-EPI 2021: 23.8 ML/MIN/1.73
EOSINOPHIL # BLD AUTO: 0.03 10*3/MM3 (ref 0–0.4)
EOSINOPHIL NFR BLD AUTO: 0.3 % (ref 0.3–6.2)
ERYTHROCYTE [DISTWIDTH] IN BLOOD BY AUTOMATED COUNT: 13.6 % (ref 12.3–15.4)
GLOBULIN UR ELPH-MCNC: 2.6 GM/DL
GLUCOSE BLDC GLUCOMTR-MCNC: 134 MG/DL (ref 70–130)
GLUCOSE BLDC GLUCOMTR-MCNC: 148 MG/DL (ref 70–130)
GLUCOSE BLDC GLUCOMTR-MCNC: 189 MG/DL (ref 70–130)
GLUCOSE BLDC GLUCOMTR-MCNC: 249 MG/DL (ref 70–130)
GLUCOSE SERPL-MCNC: 157 MG/DL (ref 65–99)
HCT VFR BLD AUTO: 23.5 % (ref 37.5–51)
HGB BLD-MCNC: 8 G/DL (ref 13–17.7)
IMM GRANULOCYTES # BLD AUTO: 0.04 10*3/MM3 (ref 0–0.05)
IMM GRANULOCYTES NFR BLD AUTO: 0.4 % (ref 0–0.5)
LYMPHOCYTES # BLD AUTO: 0.7 10*3/MM3 (ref 0.7–3.1)
LYMPHOCYTES NFR BLD AUTO: 6.9 % (ref 19.6–45.3)
MCH RBC QN AUTO: 30.1 PG (ref 26.6–33)
MCHC RBC AUTO-ENTMCNC: 34 G/DL (ref 31.5–35.7)
MCV RBC AUTO: 88.3 FL (ref 79–97)
MONOCYTES # BLD AUTO: 1.09 10*3/MM3 (ref 0.1–0.9)
MONOCYTES NFR BLD AUTO: 10.8 % (ref 5–12)
NEUTROPHILS NFR BLD AUTO: 8.23 10*3/MM3 (ref 1.7–7)
NEUTROPHILS NFR BLD AUTO: 81.4 % (ref 42.7–76)
NRBC BLD AUTO-RTO: 0 /100 WBC (ref 0–0.2)
PLATELET # BLD AUTO: 183 10*3/MM3 (ref 140–450)
PMV BLD AUTO: 11.2 FL (ref 6–12)
POTASSIUM SERPL-SCNC: 4.5 MMOL/L (ref 3.5–5.2)
PROT SERPL-MCNC: 5.5 G/DL (ref 6–8.5)
QT INTERVAL: 488 MS
QTC INTERVAL: 580 MS
RBC # BLD AUTO: 2.66 10*6/MM3 (ref 4.14–5.8)
SODIUM SERPL-SCNC: 141 MMOL/L (ref 136–145)
WBC NRBC COR # BLD AUTO: 10.11 10*3/MM3 (ref 3.4–10.8)

## 2024-04-28 PROCEDURE — 63710000001 INSULIN REGULAR HUMAN PER 5 UNITS

## 2024-04-28 PROCEDURE — 85025 COMPLETE CBC W/AUTO DIFF WBC: CPT | Performed by: INTERNAL MEDICINE

## 2024-04-28 PROCEDURE — 94799 UNLISTED PULMONARY SVC/PX: CPT

## 2024-04-28 PROCEDURE — 99291 CRITICAL CARE FIRST HOUR: CPT | Performed by: INTERNAL MEDICINE

## 2024-04-28 PROCEDURE — 93010 ELECTROCARDIOGRAM REPORT: CPT | Performed by: INTERNAL MEDICINE

## 2024-04-28 PROCEDURE — 25010000002 AMIODARONE IN DEXTROSE 5% 360-4.14 MG/200ML-% SOLUTION: Performed by: INTERNAL MEDICINE

## 2024-04-28 PROCEDURE — 82948 REAGENT STRIP/BLOOD GLUCOSE: CPT

## 2024-04-28 PROCEDURE — 25010000002 MORPHINE PER 10 MG: Performed by: INTERNAL MEDICINE

## 2024-04-28 PROCEDURE — 92610 EVALUATE SWALLOWING FUNCTION: CPT | Performed by: SPEECH-LANGUAGE PATHOLOGIST

## 2024-04-28 PROCEDURE — 99232 SBSQ HOSP IP/OBS MODERATE 35: CPT | Performed by: INTERNAL MEDICINE

## 2024-04-28 PROCEDURE — 25010000002 PIPERACILLIN SOD-TAZOBACTAM PER 1 G: Performed by: INTERNAL MEDICINE

## 2024-04-28 PROCEDURE — 71045 X-RAY EXAM CHEST 1 VIEW: CPT

## 2024-04-28 PROCEDURE — 94664 DEMO&/EVAL PT USE INHALER: CPT

## 2024-04-28 PROCEDURE — 63710000001 INSULIN GLARGINE PER 5 UNITS: Performed by: FAMILY MEDICINE

## 2024-04-28 PROCEDURE — C1751 CATH, INF, PER/CENT/MIDLINE: HCPCS

## 2024-04-28 PROCEDURE — 92612 ENDOSCOPY SWALLOW (FEES) VID: CPT | Performed by: SPEECH-LANGUAGE PATHOLOGIST

## 2024-04-28 PROCEDURE — 94761 N-INVAS EAR/PLS OXIMETRY MLT: CPT

## 2024-04-28 PROCEDURE — 80053 COMPREHEN METABOLIC PANEL: CPT | Performed by: INTERNAL MEDICINE

## 2024-04-28 PROCEDURE — 93005 ELECTROCARDIOGRAM TRACING: CPT | Performed by: INTERNAL MEDICINE

## 2024-04-28 PROCEDURE — C1894 INTRO/SHEATH, NON-LASER: HCPCS

## 2024-04-28 RX ORDER — SODIUM CHLORIDE 0.9 % (FLUSH) 0.9 %
10 SYRINGE (ML) INJECTION AS NEEDED
Status: DISCONTINUED | OUTPATIENT
Start: 2024-04-28 | End: 2024-05-08 | Stop reason: HOSPADM

## 2024-04-28 RX ORDER — MIDODRINE HYDROCHLORIDE 10 MG/1
10 TABLET ORAL
Status: DISCONTINUED | OUTPATIENT
Start: 2024-04-28 | End: 2024-05-08 | Stop reason: HOSPADM

## 2024-04-28 RX ORDER — HYDROCODONE BITARTRATE AND ACETAMINOPHEN 5; 325 MG/1; MG/1
1 TABLET ORAL EVERY 4 HOURS PRN
Status: DISPENSED | OUTPATIENT
Start: 2024-04-28 | End: 2024-05-06

## 2024-04-28 RX ORDER — SODIUM CHLORIDE 0.9 % (FLUSH) 0.9 %
10 SYRINGE (ML) INJECTION EVERY 12 HOURS SCHEDULED
Status: DISCONTINUED | OUTPATIENT
Start: 2024-04-28 | End: 2024-05-08 | Stop reason: HOSPADM

## 2024-04-28 RX ADMIN — Medication 1 CAPSULE: at 14:10

## 2024-04-28 RX ADMIN — MORPHINE SULFATE 1 MG: 2 INJECTION, SOLUTION INTRAMUSCULAR; INTRAVENOUS at 00:31

## 2024-04-28 RX ADMIN — Medication 10 ML: at 21:59

## 2024-04-28 RX ADMIN — ACETYLCYSTEINE 3 ML: 200 SOLUTION ORAL; RESPIRATORY (INHALATION) at 08:04

## 2024-04-28 RX ADMIN — PIPERACILLIN AND TAZOBACTAM 3.38 G: 3; .375 INJECTION, POWDER, LYOPHILIZED, FOR SOLUTION INTRAVENOUS at 14:10

## 2024-04-28 RX ADMIN — PIPERACILLIN AND TAZOBACTAM 3.38 G: 3; .375 INJECTION, POWDER, LYOPHILIZED, FOR SOLUTION INTRAVENOUS at 21:59

## 2024-04-28 RX ADMIN — NOREPINEPHRINE BITARTRATE 0.02 MCG/KG/MIN: 0.03 INJECTION, SOLUTION INTRAVENOUS at 20:57

## 2024-04-28 RX ADMIN — INSULIN HUMAN 2 UNITS: 100 INJECTION, SOLUTION PARENTERAL at 18:40

## 2024-04-28 RX ADMIN — ALBUTEROL SULFATE 2.5 MG: 2.5 SOLUTION RESPIRATORY (INHALATION) at 08:04

## 2024-04-28 RX ADMIN — ALBUTEROL SULFATE 2.5 MG: 2.5 SOLUTION RESPIRATORY (INHALATION) at 21:45

## 2024-04-28 RX ADMIN — HYDROCODONE BITARTRATE AND ACETAMINOPHEN 1 TABLET: 5; 325 TABLET ORAL at 14:49

## 2024-04-28 RX ADMIN — AMIODARONE HYDROCHLORIDE 0.25 MG/MIN: 1.8 INJECTION, SOLUTION INTRAVENOUS at 20:57

## 2024-04-28 RX ADMIN — AMIODARONE HYDROCHLORIDE 0.25 MG/MIN: 1.8 INJECTION, SOLUTION INTRAVENOUS at 08:24

## 2024-04-28 RX ADMIN — INSULIN GLARGINE 8 UNITS: 100 INJECTION, SOLUTION SUBCUTANEOUS at 20:56

## 2024-04-28 RX ADMIN — PANTOPRAZOLE SODIUM 40 MG: 40 INJECTION, POWDER, FOR SOLUTION INTRAVENOUS at 05:29

## 2024-04-28 RX ADMIN — AZELASTINE HYDROCHLORIDE 2 SPRAY: 137 SPRAY, METERED NASAL at 21:59

## 2024-04-28 RX ADMIN — HYDROCODONE BITARTRATE AND ACETAMINOPHEN 1 TABLET: 5; 325 TABLET ORAL at 18:52

## 2024-04-28 RX ADMIN — PIPERACILLIN AND TAZOBACTAM 3.38 G: 3; .375 INJECTION, POWDER, LYOPHILIZED, FOR SOLUTION INTRAVENOUS at 05:39

## 2024-04-28 RX ADMIN — ATORVASTATIN CALCIUM 20 MG: 20 TABLET, FILM COATED ORAL at 20:57

## 2024-04-28 RX ADMIN — ASPIRIN 81 MG CHEWABLE TABLET 81 MG: 81 TABLET CHEWABLE at 14:10

## 2024-04-28 RX ADMIN — MIDODRINE HYDROCHLORIDE 10 MG: 10 TABLET ORAL at 18:40

## 2024-04-28 RX ADMIN — ACETYLCYSTEINE 3 ML: 200 SOLUTION ORAL; RESPIRATORY (INHALATION) at 21:45

## 2024-04-28 NOTE — MBS/VFSS/FEES
Acute Care - Speech Language Pathology   Swallow Initial Evaluation [facility]  Clinical Swallow Evaluation  +Fiberoptic Endoscopic Evaluation of Swallowing (FEES)       Patient Name: Jermaine Singh  : 1945  MRN: [id number]  Today's Date: 2024               Admit Date: 2024    Visit Dx:     ICD-10-CM ICD-9-CM   1. Acute UTI  N39.0 599.0   2. Generalized weakness  R53.1 780.79   3. Edema of right lower extremity  R60.0 782.3   4. Leg erythema  L53.9 695.9   5. Chronic kidney disease, unspecified CKD stage  N18.9 585.9   6. Elevated troponin  R79.89 790.6   7. Hyperkalemia  E87.5 276.7   8. Cardiac arrest  I46.9 427.5   9. Oropharyngeal dysphagia  R13.12 787.22     Patient Active Problem List   Diagnosis    Type 2 diabetes mellitus with hyperglycemia    Hyperlipidemia LDL goal <70    Hypothyroidism (acquired)    Vitamin D deficiency on Rx     Diabetic neuropathy    Coronary artery disease involving native coronary artery of native heart with angina pectoris    Atherosclerosis of native artery of both lower extremities with intermittent claudication    Pacemaker    HFrEF (heart failure with reduced ejection fraction)    DVT, bilateral lower limbs    Cellulitis of right lower extremity    Anemia, chronic disease    PVD (peripheral vascular disease)    Diabetic foot ulcer    GERD without esophagitis    Stage 3b chronic kidney disease    Right inguinal hernia    PAF (paroxysmal atrial fibrillation)    Moderate malnutrition    UTI (urinary tract infection)    Cardiac arrest    Acute respiratory failure with hypoxia    History of DVT (deep vein thrombosis)    Elevated troponin level not due myocardial infarction     Past Medical History:   Diagnosis Date    Allergic     Arthritis     Asthma     Coronary artery disease     Diabetes mellitus 2000    started on inuslin 2018; started on po meds in ; checking blood sugars daily     Diabetic foot infection 2023    Disease of thyroid gland      po meds daily for hypothyroidism     Elevated serum creatinine 11/15/2022    Elevated troponin 10/02/2022    Generalized weakness 11/15/2022    History of fracture as a child     rt leg- severe     Hyperlipidemia     Hypertension     Hypothyroidism     PAF (paroxysmal atrial fibrillation) 07/23/2023    Peripheral neuropathy     Peripheral vascular disease     s/p angiogram 2/19-needs stent in left leg     Pleural effusion, bilateral 11/15/2022    Proximal phalanx fracture of the second digit extending into the second metatarsal joint 07/28/2022    RBBB     Right knee pain     Status post amputation of great toe and second toe of left foot 07/23/2023    Status post amputation of great toe, left 11/15/2022    Unstable angina 02/12/2019    Added automatically from request for surgery [id number]    Vitamin D deficiency      Past Surgical History:   Procedure Laterality Date    AMPUTATION DIGIT Left 01/17/2023    Procedure: AMPUTATION DIGIT LEFT;  Surgeon: Gopal Márquez MD;  Location:  [facility];  Service: Vascular;  Laterality: Left;    APPENDECTOMY      CARDIAC CATHETERIZATION N/A 02/14/2019    Procedure: Left Heart Cath;  Surgeon: Cooper Apodaca MD;  Location:  [facility] CATH INVASIVE LOCATION;  Service: Cardiology    CARDIAC ELECTROPHYSIOLOGY PROCEDURE N/A 05/18/2022    Procedure: DEVICE IMPLANT;  Surgeon: Cooper Apodaca MD;  Location:  [facility] CATH INVASIVE LOCATION;  Service: Cardiology;  Laterality: N/A;    CARDIAC SURGERY      COLONOSCOPY      CORONARY ARTERY BYPASS GRAFT N/A 03/22/2019    Procedure: MEDIAN STERNOTOMY, CORONARY ARTERY BYPASS GRAFT X3, UTILIZING THE LEFT INTERNAL MAMMARY ARTERY, EVH AND OPEN HARVEST OF THE RIGHT GREATER SAPHENOUS VEIN, EXPLORATION OF THE LEFT LEG;  Surgeon: Ap Au MD;  Location:  [facility];  Service: Cardiothoracic    EYE SURGERY Bilateral     cataracts     FRACTURE SURGERY      INTERVENTIONAL RADIOLOGY PROCEDURE N/A 07/29/2021    Procedure: Abdominal Aortagram  with Runoff;  Surgeon: Jermaine Hernandez MD;  Location:  [facility] INVASIVE LOCATION;  Service: Cardiovascular;  Laterality: N/A;    KNEE ARTHROSCOPY      right x 2, left x 1    LACERATION REPAIR      right leg    LEG SURGERY      2 for fracture of rt leg     TONSILLECTOMY      Adnoidectomy    TRANS METATARSAL AMPUTATION Left 08/02/2022    Procedure: GREAT TOE AMPUTATION LEFT;  Surgeon: Gopal Márquez MD;  Location:  [facility];  Service: Vascular;  Laterality: Left;       SLP Recommendation and Plan  SLP Swallowing Diagnosis: moderate, pharyngeal dysphagia (04/28/24 1330)  SLP Diet Recommendation: regular textures, no mixed consistencies, nectar thick liquids (no straws) (04/28/24 1330)  Recommended Precautions and Strategies: upright posture during/after eating, small bites of food and sips of liquid, no straw, multiple swallows per bite of food, multiple swallows per sip of liquid, general aspiration precautions, assist with feeding (04/28/24 1330)  SLP Rec. for Method of Medication Administration: meds whole, meds crushed, with puree, as tolerated (04/28/24 1330)     Monitor for Signs of Aspiration: yes, notify SLP if any concerns (04/28/24 1330)  Recommended Diagnostics:  (? SLC eval pending mentation) (04/28/24 1330)  Swallow Criteria for Skilled Therapeutic Interventions Met: demonstrates skilled criteria (04/28/24 1330)  Anticipated Discharge Disposition (SLP): inpatient rehabilitation facility (04/28/24 1330)  Rehab Potential/Prognosis, Swallowing: good, to achieve stated therapy goals (04/28/24 1330)  Therapy Frequency (Swallow): 5 days per week (04/28/24 1330)  Predicted Duration Therapy Intervention (Days): until discharge (04/28/24 1330)  Oral Care Recommendations: Oral Care BID/PRN, Suction toothbrush (04/28/24 1330)               SWALLOW EVALUATION (Last 72 Hours)       SLP Adult Swallow Evaluation       Row Name 04/28/24 1330 04/28/24 0930       Rehab Evaluation    Document Type evaluation  -  re-evaluation  -CJ    Subjective Information no complaints  -CJ no complaints  -CJ    Patient Observations alert;cooperative  -CJ alert;cooperative  -CJ    Patient/Family/Caregiver Comments/Observations spouse/daughter present and RN  -CJ spouse present  -CJ    Patient Effort good  -CJ good  -CJ    Symptoms Noted During/After Treatment none  -CJ none  -CJ       General Information    Patient Profile Reviewed yes  -CJ yes  -CJ    Pertinent History Of Current Problem see am eval; referred for FEES  -CJ Pt adm on 4/22 w/ UTI then subsequent cardiac arrest on 4/25, extubated 4/26. Has been BiPAP dependent; sig h/o DM2, HLD, hypothyroidism, CAD, pacemaker, CHR, CKD, DVT, PAF.  -CJ    Current Method of Nutrition NPO;no current diet order  -CJ NPO;no current diet order  -CJ    Precautions/Limitations, Vision WFL;for purposes of eval  -CJ WFL;for purposes of eval  -CJ    Precautions/Limitations, Hearing WFL;for purposes of eval  -CJ WFL;for purposes of eval  -CJ    Prior Level of Function-Communication unknown  -CJ unknown  -CJ    Prior Level of Function-Swallowing unknown  -CJ unknown  -CJ    Plans/Goals Discussed with patient;family  -CJ patient;family  -CJ    Barriers to Rehab medically complex  -CJ medically complex  -CJ    Patient's Goals for Discharge patient did not state  -CJ patient did not state  -CJ    Family Goals for Discharge patient able to return to PO diet  -CJ --       Pain    Additional Documentation Pain Scale: FACES Pre/Post-Treatment (Group)  -CJ Pain Scale: FACES Pre/Post-Treatment (Group)  -CJ       Pain Scale: FACES Pre/Post-Treatment    Pain: FACES Scale, Pretreatment 0-->no hurt  -CJ 0-->no hurt  -CJ    Posttreatment Pain Rating 0-->no hurt  -CJ 0-->no hurt  -CJ       Oral Motor Structure and Function    Dentition Assessment -- natural, present and adequate  -CJ    Secretion Management -- requires suctioning to control secretions  -CJ    Mucosal Quality -- dry;sticky  -CJ       Oral Musculature  and Cranial Nerve Assessment    Oral Motor General Assessment -- vocal impairment  -    Vocal Impairment, Detail. Cranial Nerve X (Vagus) -- vocal quality abnormality (see comments)  -    Oral Motor, Comment -- decreased vocal intensity; breathy quality  -       General Eating/Swallowing Observations    Respiratory Support Currently in Use -- nasal cannula  -    Eating/Swallowing Skills -- fed by SLP  -    Positioning During Eating -- upright in bed  -    Utensils Used -- spoon;cup;straw  -    Consistencies Trialed -- pureed;ice chips;thin liquids  -       Respiratory    Date of Intubation -- 4/25-4/26  -       Clinical Swallow Eval    Pharyngeal Phase -- suspected pharyngeal impairment  -    Clinical Swallow Evaluation Summary -- Pt noted w/ productive cough of secretions throughout entire evaluation. Increased coughing w/ trials of thin liquids. Will plan to complete FEES this date w/ further recs pending. Continue NPO w/ 2-3 ice chips until FEES today  -       Pharyngeal Phase Concerns    Pharyngeal Phase Concerns -- cough  -       Fiberoptic Endoscopic Evaluation of Swallowing (FEES)    Risks/Benefits Reviewed risks/benefits explained;patient;family;agreed to eval  -CJ --    Nasal Entry right:  -CJ --    Scope serial number/identification [id number]  - --       Anatomy and Physiology    Anatomic Considerations edema;erythema  -CJ --    Velopharyngeal WFL  -CJ --    Base of Tongue symmetrical;range reduced  -CJ --    Epiglottis rests posteriorly  -CJ --    Laryngeal Function Breathing symmetrical  -CJ --    Laryngeal Function Phonation symmetrical  -CJ --    Laryngeal Function to Breath Hold incomplete closure  -CJ --    Secretion Rating Scale (Lucio et al. 1996) 2- secretions initially outside the vestibule but later entered the vestibule  -CJ --    Secretion Description thick;discolored  -CJ --    Ice Chips elicited swallow;partially cleared secretions;aspirated  -CJ --    Spontaneous  Swallow frequency reduced  -CJ --    Sensory reduced sensation  -CJ --    Utensils Used Spoon;Cup;Straw  -CJ --    Consistencies Trialed ice chips;thin liquids;nectar-thick liquids;pudding/puree;soft to chew;regular textures;spoon;cup;straw  -CJ --       FEES Interpretation    Oral Phase prolonged manipulation;prespill of liquids into pharynx  -CJ --       Initiation of Pharyngeal Swallow    Initiation of Pharyngeal Swallow bolus in pyriform sinuses  -CJ --    Pharyngeal Phase impaired pharyngeal phase of swallowing  -CJ --    Penetration Before the Swallow thin liquids;secondary to reduced back of tongue control;secondary to delayed swallow initiation or mistiming  -CJ --    Aspiration During the Swallow thin liquids;secondary to delayed swallow initiation or mistiming;secondary to reduced laryngeal elevation;secondary to reduced vestibular closure;nectar-thick liquids  nectar via straw only  -CJ --    Aspiration After the Swallow thin liquids;secondary to residue;in pyriform sinuses  -CJ --    Depth of Penetration deep  -CJ --    Response to Penetration No  -CJ --    No spontaneous response to penetration and non-effective laryngeal clearance with cue (see comments)  -CJ --    Response to Aspiration No  -CJ --    No spontaneous response to aspiration with non-effective subglottic clearance with cue (see comments)  cued weak cough  -CJ --    Rosenbek's Scale thin:;8-->Level 8  -CJ --    Residue thin liquids;nectar-thick liquids;diffuse within pharynx;secondary to reduced posterior pharyngeal wall stripping;secondary to reduced laryngeal elevation;secondary to reduced hyolaryngeal excursion  worse w/thins and nectar via straw  -CJ --    Response to Residue unable to clear residue;with cued swallow  -CJ --    Attempted Compensatory Maneuvers bolus presentation style;bolus size;additional subsequent swallow;multiple swallows;throat clear after swallow  -CJ --    Response to Attempted Compensatory Maneuvers prevented  aspiration  -CJ --    Successful Compensatory Maneuver Competency patient unable to adequately demonstrate/teach back compensations  - --    FEES Summary Moderate pharyngeal dysphagia. Suspect mentation/timing/diffuse weakness impacting. Penetration w/ thin liquids before the swallow w/ subsequent silent aspiration during the swallow. Cued cough ineffective to clear. Silent aspiration w/ nectar via straw only during the swallow. Pt continued to silently aspirate thin residue after the swallow 2/2 inability to clear. No penetration or aspiration w/ puree/solid/nectar via spoon/cup. Okay for regular diet (as tolerated, may have to downgrade per pt's mentation) w/ nectar thick liquids, no straws, no mixed consistencies. Meds whole/crushed in puree/pudding.  - --       SLP Evaluation Clinical Impression    SLP Swallowing Diagnosis moderate;pharyngeal dysphagia  -CJ suspected pharyngeal dysphagia  -    Functional Impact risk of aspiration/pneumonia  - risk of aspiration/pneumonia  -    Rehab Potential/Prognosis, Swallowing good, to achieve stated therapy goals  - good, to achieve stated therapy goals  -    Swallow Criteria for Skilled Therapeutic Interventions Met demonstrates skilled criteria  - demonstrates skilled criteria  -       Recommendations    Therapy Frequency (Swallow) 5 days per week  -CJ --    Predicted Duration Therapy Intervention (Days) until discharge  -CJ until discharge  -    SLP Diet Recommendation regular textures;no mixed consistencies;nectar thick liquids  no straws  - NPO  2-3 ice chips  -    Recommended Diagnostics --  ? SLC eval pending mentation  -CJ reassess via FEES  -    Recommended Precautions and Strategies upright posture during/after eating;small bites of food and sips of liquid;no straw;multiple swallows per bite of food;multiple swallows per sip of liquid;general aspiration precautions;assist with feeding  - general aspiration precautions  -    Oral Care  Recommendations Oral Care BID/PRN;Suction toothbrush  -CJ Oral Care BID/PRN;Suction toothbrush  -CJ    SLP Rec. for Method of Medication Administration meds whole;meds crushed;with puree;as tolerated  -CJ meds via alternate route  -CJ    Monitor for Signs of Aspiration yes;notify SLP if any concerns  -CJ yes;notify SLP if any concerns  -CJ    Anticipated Discharge Disposition (SLP) inpatient rehabilitation facility  -CJ inpatient rehabilitation facility  -CJ              User Key  (r) = Recorded By, (t) = Taken By, (c) = Cosigned By      Initials Name Effective Dates     Coty Blanchard, MS CCC-SLP 07/11/23 -                     EDUCATION  The patient has been educated in the following areas:   Dysphagia (Swallowing Impairment) Oral Care/Hydration Modified Diet Instruction.        SLP GOALS       Row Name 04/28/24 1330             (LTG) Patient will demonstrate functional swallow for    Diet Texture (Demonstrate functional swallow) regular textures  -CJ      Liquid viscosity (Demonstrate functional swallow) thin liquids  -CJ      [place] (Demonstrate functional swallow) with minimal cues (75-90% accuracy)  -CJ      Time Frame (Demonstrate functional swallow) by discharge  -CJ      Progress/Outcomes (Demonstrate functional swallow) new goal  -CJ         (STG) Patient will tolerate trials of    Consistencies Trialed (Tolerate trials) regular textures;nectar/ mildly thick liquids  -CJ      Desired Outcome (Tolerate trials) without signs/symptoms of aspiration;without signs of distress;with adequate oral prep/transit/clearance  -CJ      [place] (Tolerate trials) with minimal cues (75-90% accuracy)  -CJ      Time Frame (Tolerate trials) 1 week  -CJ      Progress/Outcomes (Tolerate trials) new goal  -CJ         (STG) Lingual Strengthening Goal 1 (SLP)    Activity (Lingual Strengthening Goal 1, SLP) increase tongue back strength  -CJ      Increase Tongue Back Strength lingual resistance exercises  -CJ       Wheeler/Accuracy (Lingual Strengthening Goal 1, SLP) with moderate cues (50-74% accuracy)  -CJ      Time Frame (Lingual Strengthening Goal 1, SLP) 1 week  -CJ      Progress/Outcomes (Lingual Strengthening Goal 1, SLP) new goal  -CJ         ([facility]) Pharyngeal Strengthening Exercise Goal 1 (SLP)    Activity (Pharyngeal Strengthening Goal 1, SLP) increase tongue base retraction;increase squeeze/positive pressure generation;increase closure at entrance to airway/closure of airway at glottis;increase superior movement of the hyolaryngeal complex;increase anterior movement of the hyolaryngeal complex  -CJ      Increase Superior Movement of the Hyolaryngeal Complex effortful pitch glide (falsetto + pharyngeal squeeze)  -CJ      Increase Anterior Movement of the Hyolaryngeal Complex EMST  -CJ      Increase Closure at Entrance to Airway/Closure of Airway at Glottis supraglottic swallow;breath hold exercises  -CJ      Increase Squeeze/Positive Pressure Generation hard effortful swallow  -CJ      Increase Tongue Base Retraction micha  -CJ      Wheeler/Accuracy (Pharyngeal Strengthening Goal 1, SLP) with moderate cues (50-74% accuracy)  -CJ      Time Frame (Pharyngeal Strengthening Goal 1, SLP) 1 week  -CJ      Progress/Outcomes (Pharyngeal Strengthening Goal 1, SLP) new goal  -CJ                User Key  (r) = Recorded By, (t) = Taken By, (c) = Cosigned By      Initials Name Provider Type    Coty Ledesma, MS CCC-SLP Speech and Language Pathologist                       Time Calculation:    Time Calculation- SLP       Row Name 04/28/24 1535 04/28/24 1121          Time Calculation- SLP    SLP Start Time 1330  - 0930  -CJ     SLP Received On 04/28/24  -CJ 04/28/24  -        Untimed Charges    52830-IC Eval Oral Pharyng Swallow Minutes -- 40  -CJ     77432-LK Fiberoptic Endo Eval Swallow Minutes 85  -CJ --        Total Minutes    Untimed Charges Total Minutes 85  -CJ 40  -CJ      Total Minutes 85  -CJ 40  -CJ                User Key  (r) = Recorded By, (t) = Taken By, (c) = Cosigned By      Initials Name Provider Type     Coty Blanchard, MS CCC-SLP Speech and Language Pathologist                    Therapy Charges for Today       Code Description Service Date Service Provider Modifiers Qty    20093947125 HC ST EVAL ORAL PHARYNG SWALLOW 3 4/28/2024 Coty Blanchard, MS CCC-SLP GN 1    02771906618 HC ST FIBEROPTIC ENDO EVAL SWALL 6 4/28/2024 Coty Blanchard, MS CCC-SLP GN 1                 Coty Blanchard MS CCC-SLP  4/28/2024

## 2024-04-28 NOTE — PLAN OF CARE
Goal Outcome Evaluation:           Progress: no change  Outcome Evaluation:    -Patient remains on the bipap with 30% FiO2. He wore the bipap for most of the night, but was intermittently placed on 4L NC during care and tolerated well.   -He remains oriented and cooperative and rested well on the Bipap.   -Continues to have a weak congested cough, but is intermittently able to cough thick tan/white sputum with yankauer suction. NT suctioned x1.   -PRN morphine given x2 doses for complaints of chest pain with coughing.   -Continues on Amiodarone and low dose levophed gtts.   -UOP remains low (340 ml so far this shift.)

## 2024-04-28 NOTE — PROGRESS NOTES
Jermaine JIMENES Francisco  [id number]  1945   LOS: 5 days   Patient Care Team:  Marysol Shrestha MD as PCP - General (Internal Medicine)  Cooper Apodaca MD as Consulting Physician (Cardiology)    Chief Complaint:  HD / CARDIAC ARREST / SSS / T2 DM / STAGE IV CKD / SEVERE ANEMIA / HFrEF / CARDIOGENIC SHOCK / ABNORMAL LFTs     Subjective     Comfortable supine on BiPAP oxygen therapy with FiO2 0.30 (oximetry 100%).  No apparent anginal type chest discomfort, increased tachypnea/dyspnea, nausea, emesis, or abdominal pain.  Does receive intermittent low-dose IV morphine sulfate.  No posturing or overt seizure activity.  No significant response to verbal as well as tactile stimuli currently after morphine sulfate.    Objective     Vital Sign Min/Max for last 24 hours  Temp  Min: 97.3 °F (36.3 °C)  Max: 97.9 °F (36.6 °C)   BP  Min: 69/45  Max: 152/127   Pulse  Min: 59  Max: 79   Resp  Min: 14  Max: 34   SpO2  Min: 87 %  Max: 100 %      Weight  Min: 84.4 kg (186 lb 1.1 oz)  Max: 84.4 kg (186 lb 1.1 oz)         04/27/24  0400 04/28/24  0550   Weight: 81.2 kg (179 lb 0.2 oz) 84.4 kg (186 lb 1.1 oz)         Intake/Output Summary (Last 24 hours) at 4/28/2024 0703  Last data filed at 4/28/2024 0550  Gross per 24 hour   Intake 881.5 ml   Output 720 ml   Net 161.5 ml       Physical Exam:     General Appearance:    Alert, cooperative, in no acute distress   Lungs:     Clear to auscultation few scattered rhonchi and wheezes without crackles or dullness, respirations regular, even and   unlabored    Heart:    Regular and normal rate, normal S1 and S2, 1/6 systolic    murmur, no gallop, no rub, no click   Abdomen:  Extremities:   Soft, nontender, bowel sounds audible x4    No edema, normal range of motion   Pulses:   Pulses palpable and equal bilaterally      Results Review:   Results from last 7 days   Lab Units 04/28/24  0405 04/27/24  0607 04/26/24  0410   SODIUM mmol/L 141 142 137   POTASSIUM mmol/L 4.5 4.7 4.8   CHLORIDE mmol/L  108* 110* 106   CO2 mmol/L 17.0* 18.0* 16.0*   BUN mg/dL 84* 82* 78*   CREATININE mg/dL 2.65* 2.37* 2.28*   GLUCOSE mg/dL 157* 142* 188*   CALCIUM mg/dL 8.0* 8.0* 7.9*     Results from last 7 days   Lab Units 04/28/24  0405 04/27/24  0607 04/26/24  1230 04/26/24  0410   WBC 10*3/mm3 10.11 9.89  --  9.95   HEMOGLOBIN g/dL 8.0* 7.8* 8.2* 7.9*   HEMATOCRIT % 23.5* 23.4* 24.3* 23.7*   PLATELETS 10*3/mm3 183 150  --  158     Results from last 7 days   Lab Units 04/23/24  0550   HEMOGLOBIN A1C % 8.50*     Results from last 7 days   Lab Units 04/22/24  1713 04/22/24  1506   HSTROP T ng/L 119* 123*     ALBUMIN: 2.9    AST: 76    ALT: 450    EKG:    Electronic ventricular pacemaker  When compared with ECG of 27-APR-2024 05:40,  Vent. rate has increased BY   3 BPM    CXR: WNL cardiac size and cardiomediastinum with generally clear lungs and minimal blunting of left costophrenic angle without overt pulmonary vascular congestion or pneumothorax; formal official report pending.    Medication Review: REVIEWED; DRIPS:    IV amiodarone 0.5 mg/minute continuous infusion  IV norepinephrine 0.04 mcg/kilogram/minute continuous infusion    Assessment & Plan     Improving LFTs which may represent resolving shock liver.  Marginal blood pressure and remains catecholamine dependent with marginal oxygenation predominantly on BiPAP at FiO2 0.30.  Overall comfortable, cooperative, and currently DNI.  Guideline directed medical therapy for advanced congestive heart failure limited by marginal hemodynamics and progressive azotemia.  Prognosis remains very guarded.  Currently not on diuretic therapy and on moderate dose midodrine.  Persistent severe anemia, albeit stable and would not transfuse.    Discussed with patient and wife.      Cardiac arrest    Type 2 diabetes mellitus with hyperglycemia    Hyperlipidemia LDL goal <70    Hypothyroidism (acquired)    Coronary artery disease involving native coronary artery of native heart with angina  pectoris    Pacemaker    HFrEF (heart failure with reduced ejection fraction)    Anemia, chronic disease    Stage 3b chronic kidney disease    UTI (urinary tract infection)    Acute respiratory failure with hypoxia    Elevated troponin level not due myocardial infarction    04/28/24  07:03 EDT

## 2024-04-28 NOTE — PROGRESS NOTES
INTENSIVIST   PROGRESS NOTE     Hospital:  LOS: 5 days     Mr. Jermaine Singh, 79 y.o. male is followed for a Chief Complaint of: Cardiac Arrest      Subjective   S     Interval History:  Remains on Bipap this AM. Mentation is poor.        The patient's relevant past medical, surgical and social history were reviewed and updated in Epic as appropriate.      ROS: Unable to obtain secondary to mentation.     Objective   O     Vitals:  Temp  Min: 97.3 °F (36.3 °C)  Max: 97.9 °F (36.6 °C)  BP  Min: 69/45  Max: 152/127  Pulse  Min: 59  Max: 79  Resp  Min: 18  Max: 34  SpO2  Min: 87 %  Max: 100 % Flow (L/min)  Min: 4  Max: 4    Intake/Ouptut 24 hrs (7:00AM - 6:59 AM)  Intake & Output (last 3 days)         04/24 0701  04/25 0700 04/25 0701  04/26 0700 04/26 0701  04/27 0700 04/27 0701  04/28 0700    P.O.        I.V. (mL/kg)  1075 (13.2) 906.5 (11.2) 198.4 (2.4)    Other  10      IV Piggyback   199.8     Total Intake(mL/kg)  1085 (13.3) 1106.3 (13.6) 198.4 (2.4)    Urine (mL/kg/hr) 875 (0.5) 550 (0.3) 855 (0.4) 200 (0.6)    Emesis/NG output   0     Total Output 875 550 855 200    Net -875 +535 +251.3 -1.6            Urine Unmeasured Occurrence 1 x       Stool Unmeasured Occurrence 1 x       Emesis Unmeasured Occurrence   3 x             Medications (drips):  amiodarone, Last Rate: 0.25 mg/min (04/28/24 0824)  EPINEPHrine, Last Rate: Stopped (04/25/24 1925)  norepinephrine, Last Rate: 0.01 mcg/kg/min (04/28/24 1038)  phenylephrine, Last Rate: Stopped (04/25/24 1547)          Physical Examination  Telemetry:  Normal sinus rhythm.    Constitutional:  No acute distress.  Resting in bed on Bipap.    Eyes: No scleral icterus.   PERRL, EOM intact.    Neck:  Supple, FROM   Cardiovascular: Normal rate, regular and rhythm. Normal heart sounds.  No murmurs, gallop or rub.   Respiratory: No respiratory distress. Normal respiratory effort.  Bilateral rhonchi.    Abdominal:  Soft. No masses. Nontender. No distension. No HSM.    Extremities: No digital cyanosis. No clubbing.  No peripheral edema.   Skin: No rashes, lesions or ulcers   Neurological:   Opens eyes to stimulation. Does not answer questions.              Results from last 7 days   Lab Units 04/28/24  0405 04/27/24  0607 04/26/24  1230 04/26/24  0410   WBC 10*3/mm3 10.11 9.89  --  9.95   HEMOGLOBIN g/dL 8.0* 7.8* 8.2* 7.9*   MCV fL 88.3 89.3  --  88.8   PLATELETS 10*3/mm3 183 150  --  158     Results from last 7 days   Lab Units 04/28/24  0405 04/27/24  0607 04/26/24  0410 04/25/24  1441   SODIUM mmol/L 141 142 137 140   POTASSIUM mmol/L 4.5 4.7 4.8 4.5   CO2 mmol/L 17.0* 18.0* 16.0* 13.0*   CREATININE mg/dL 2.65* 2.37* 2.28* 2.07*   GLUCOSE mg/dL 157* 142* 188* 293*   MAGNESIUM mg/dL  --  2.9* 2.9* 4.6*     Estimated Creatinine Clearance: 27 mL/min (A) (by C-G formula based on SCr of 2.65 mg/dL (H)).  Results from last 7 days   Lab Units 04/28/24  0405 04/27/24  0607 04/26/24  0410   ALK PHOS U/L 85 107 130*   BILIRUBIN mg/dL 0.6 0.6 0.6   ALT (SGPT) U/L 450* 626* 1,028*   AST (SGOT) U/L 76* 117* 787*       Results from last 7 days   Lab Units 04/26/24  0313 04/25/24  1447   PH, ARTERIAL pH units 7.441 7.234*   PCO2, ARTERIAL mm Hg 24.6* 28.2*   PO2 ART mm Hg 118.0* 373.0*   FIO2 % 30 100       Images:  Imaging Results (Last 24 Hours)       Procedure Component Value Units Date/Time    XR Chest 1 View [[id number]] Collected: 04/28/24 1013     Updated: 04/28/24 1019    Narrative:      XR CHEST 1 VW    Date of Exam: 4/28/2024 4:30 AM EDT    Indication: Hypoxia, aspiration    Comparison: 4/27/2024    Findings:  Heart size and pulmonary vasculature are stable. There is been resolution of right upper lobe airspace disease. The lungs are clear other than mild atelectasis in the lung bases. Costophrenic angles are sharp      Impression:      Impression:    1. Interval improvement in right upper lobe airspace disease.    2. Bibasilar atelectasis      Electronically Signed: Miguel  Brandy    4/28/2024 10:16 AM EDT    Workstation ID: [id number]               Results: Reviewed.  I reviewed the patient's new laboratory and imaging results.  I independently reviewed the patient's new images.    Medications: Reviewed.    Assessment & Plan   A / P     Mr. Singh is a 80yo M with a history of T2DM, Stage 3 CKD, bilateral DVTs, paroxysmal Afib, SSS s/p post-pacemaker placement, CHF, PVD, and left diabetic foot infection who presented to [facility] on 4/22/24 with weakness and a chronic wound on his RLE. UA was consistent with a UTI. He was started on Rocephin and admitted to Hospital Medicine.      On 4/25/24, he suffered a Vfib arrest and was intubated during CPR. He was transferred to the ICU. He was able to be extubated on 4/26/24. After extubation, his respiratory status was tenuous and family decided to make him a DNR/DNI.     He is on Levophed and Amiodarone infusions.     ID is following for antibiotic management and he is currently on Zosyn.     Placed on Bipap yesterday for increased work of breathing. Mentation is worse this AM.     Nutrition:   NPO Diet NPO Type: Strict NPO  Advance Directives:   Code Status and Medical Interventions:   Ordered at: 04/26/24 1353     Medical Intervention Limits:    NO intubation (DNI)    NO cardioversion     Level Of Support Discussed With:    Next of Kin (If No Surrogate)    Health Care Surrogate     Code Status (Patient has no pulse and is not breathing):    No CPR (Do Not Attempt to Resuscitate)     Medical Interventions (Patient has pulse or is breathing):    Limited Support       Active Hospital Problems    Diagnosis     **Cardiac arrest     Acute respiratory failure with hypoxia     Elevated troponin level not due myocardial infarction     UTI (urinary tract infection)     Anemia, chronic disease     Stage 3b chronic kidney disease     HFrEF (heart failure with reduced ejection fraction)     Pacemaker     Type 2 diabetes mellitus with hyperglycemia      Hyperlipidemia LDL goal <70     Hypothyroidism (acquired)     Coronary artery disease involving native coronary artery of native heart with angina pectoris        Assessment / Plan:    Continue ICU care. High risk for decline.   Titrate Levophed.   Continue mucomyst nebs and incentive spirometry. NT suctioning as needed.   Speech evaluation. FEES today if able to participate. Plan to place a keofeed and start tube feeds if he is unable to pass a speech evaluation.   Monitor renal function. BUN/Cr slightly worse and urine output is decreasing.   Amiodarone per Cardiology.   Mechanical DVT ppx.   AM labs and CXR    Remains critically ill secondary to ongoing shock requiring vasopressor titration. Discussed with the patient's wife that he may continue to decline despite our best efforts.     High level of risk due to:  severe exacerbation of chronic illness and illness with threat to life or bodily function.    Plan of care and goals reviewed during interdisciplinary rounds.  I discussed the patient's findings and my recommendations with patient, family, and nursing staff    Critical Care Time: was greater than 31 minutes.(This excludes time spent performing separately reportable procedures and services). including high complexity decision making to assess, manipulate, and support vital organ system failure in this individual who has impairment of one or more vital organ systems such that there is a high probability of imminent or life threatening deterioration in the patient’s condition.      Hailee Shukla, DO    Intensive Care Medicine and Pulmonary Medicine

## 2024-04-28 NOTE — PROGRESS NOTES
INFECTIOUS DISEASE Progress Note    Jermaine Singh  1945  [id number]    Admission Date: 4/22/2024      Requesting Provider: No ref. provider found  Evaluating Physician: Lincoln Padilla MD    Reason for Consultation:  UTI    History of present illness:    4/23/24: Patient is a 79 y.o. male  with a history of type 2 diabetes mellitus, peripheral neuropathy, stage III chronic kidney disease, bilateral DVTs, paroxysmal atrial fibrillation, sick sinus syndrome status post pacemaker placement, CHF, peripheral vascular disease, and left diabetic foot infection who is seen today for evaluation of UTI.  He had a history of UTI last month with urine cultures and early March growing yeast.  He was apparently treated with an antifungal agent.    Over the last few days he noted the onset of profound weakness and fatigue.  He also noticed some dysuria.  He was brought to the emergency room and found to have pyuria and bacteriuria with leukocytosis.   Urine culture was sent and he was started on ceftriaxone.   His white blood cell count today is 11.8.   He was noted to have a blister over his right fifth metatarsal head and a chronic right pretibial wound.    4/24/24: He remains afebrile.  His urine culture was finalized as negative.  I have asked the laboratory to hold the urine culture for yeast but they did not and it was disposed of last night.   He states that his dysuria is improved today.  Feels better.     4/25/2024:  He remains afebrile.  White blood cell count today is 6.7.  Creatinine is 1.99.   Urinalysis yesterday revealed too numerous to count white blood cells.  He denied dysuria this morning.  After I saw him he suffered a V-fib cardiac arrest and was transferred to the ICU.  He is endotracheally intubated    4/26/24:   His maximum temperature over the last 24 hours is 99.5.  Repeat urine culture from 4/24 is pending.  He remains on ventilatory support.    Creatinine is 2.28.  ALT is  1028.  White  blood cell count is 9.95.  O2 saturation is 100% on an FiO2 of 30%.  Chest x-ray reveals no pulmonary infiltrates. \    Later this morning he was extubated.  He is confused.  He knows his name and his birthdate.  He cannot answer more complex questions.    4/27/2024:  His maximum temperature over the last 24 hours is 99.5.  His left ventricular ejection fraction is less than 20%.  His white blood cell count is 2.37.  ALT is down to 626.   Chest x-ray 4/27 reveals moderate hemoglobin airspace disease.   O2 saturation is 99% on 4 L.   He is on Zosyn therapy.   Later today his oxygen demand increased and he was placed   On BiPAP.     4/28/2024:   He has remained afebrile.   White blood cell count is 10.1. His creatinine is 2.65 ALT is 450.  His oxygen demand is down to 1 liter today. Chest x-ray today reveals interval improvement in the right upper lobe infiltrate.  He is coughing and has some sputum production.  He remains confused and did not recognize his wife and daughter today    Past Medical History:   Diagnosis Date    Allergic     Arthritis     Asthma     Coronary artery disease     Diabetes mellitus 2000    started on inuslin 12/2018; started on po meds in 2000; checking blood sugars daily     Diabetic foot infection 07/23/2023    Disease of thyroid gland     po meds daily for hypothyroidism     Elevated serum creatinine 11/15/2022    Elevated troponin 10/02/2022    Generalized weakness 11/15/2022    History of fracture as a child     rt leg- severe     Hyperlipidemia     Hypertension     Hypothyroidism     PAF (paroxysmal atrial fibrillation) 07/23/2023    Peripheral neuropathy     Peripheral vascular disease     s/p angiogram 2/19-needs stent in left leg     Pleural effusion, bilateral 11/15/2022    Proximal phalanx fracture of the second digit extending into the second metatarsal joint 07/28/2022    RBBB     Right knee pain     Status post amputation of great toe and second toe of left foot 07/23/2023     Status post amputation of great toe, left 11/15/2022    Unstable angina 02/12/2019    Added automatically from request for surgery [id number]    Vitamin D deficiency        Past Surgical History:   Procedure Laterality Date    AMPUTATION DIGIT Left 01/17/2023    Procedure: AMPUTATION DIGIT LEFT;  Surgeon: Gopal Márquez MD;  Location:  [facility] OR;  Service: Vascular;  Laterality: Left;    APPENDECTOMY      CARDIAC CATHETERIZATION N/A 02/14/2019    Procedure: Left Heart Cath;  Surgeon: Cooper Apodaca MD;  Location: [facility] CATH INVASIVE LOCATION;  Service: Cardiology    CARDIAC ELECTROPHYSIOLOGY PROCEDURE N/A 05/18/2022    Procedure: DEVICE IMPLANT;  Surgeon: Cooper Aopdaca MD;  Location: [facility] CATH INVASIVE LOCATION;  Service: Cardiology;  Laterality: N/A;    CARDIAC SURGERY      COLONOSCOPY      CORONARY ARTERY BYPASS GRAFT N/A 03/22/2019    Procedure: MEDIAN STERNOTOMY, CORONARY ARTERY BYPASS GRAFT X3, UTILIZING THE LEFT INTERNAL MAMMARY ARTERY, EVH AND OPEN HARVEST OF THE RIGHT GREATER SAPHENOUS VEIN, EXPLORATION OF THE LEFT LEG;  Surgeon: Ap Au MD;  Location: [facility] OR;  Service: Cardiothoracic    EYE SURGERY Bilateral     cataracts     FRACTURE SURGERY      INTERVENTIONAL RADIOLOGY PROCEDURE N/A 07/29/2021    Procedure: Abdominal Aortagram with Runoff;  Surgeon: Jermaine Hernandez MD;  Location: [facility] CATH INVASIVE LOCATION;  Service: Cardiovascular;  Laterality: N/A;    KNEE ARTHROSCOPY      right x 2, left x 1    LACERATION REPAIR      right leg    LEG SURGERY      2 for fracture of rt leg     TONSILLECTOMY      Adnoidectomy    TRANS METATARSAL AMPUTATION Left 08/02/2022    Procedure: GREAT TOE AMPUTATION LEFT;  Surgeon: Gopal Márquez MD;  Location: [facility] OR;  Service: Vascular;  Laterality: Left;       Family History   Problem Relation Age of Onset    Coronary artery disease Mother     Diabetes Mother     Hyperlipidemia Mother     Cancer Father        Social History      Socioeconomic History    Marital status:     Number of children: 2   Tobacco Use    Smoking status: Never     Passive exposure: Past    Smokeless tobacco: Never   Vaping Use    Vaping status: Never Used   Substance and Sexual Activity    Alcohol use: Not Currently     Comment: every 2-3 months    Drug use: No    Sexual activity: Not Currently     Partners: Female       Allergies   Allergen Reactions    Vancomycin Other (See Comments)     Kidney failure per last admission         Medication:    Current Facility-Administered Medications:     acetaminophen (TYLENOL) tablet 650 mg, 650 mg, Oral, Q6H PRN, Maricel Peraza, APRN, 650 mg at 04/25/24 0940    acetylcysteine (MUCOMYST) 20 % nebulizer solution 3 mL, 3 mL, Nebulization, BID - RT, Nathalia Mckeon DNP, APRN, 3 mL at 04/28/24 0804    albuterol (PROVENTIL) nebulizer solution 0.083% 2.5 mg/3mL, 2.5 mg, Nebulization, BID PRN, Nathalia Mckeon DNP, APRN, 2.5 mg at 04/28/24 0804    amiodarone 360 mg in 200 mL D5W infusion, 0.25 mg/min, Intravenous, Continuous, Golden Madison MD, Last Rate: 8.33 mL/hr at 04/28/24 0824, 0.25 mg/min at 04/28/24 0824    [Held by provider] apixaban (ELIQUIS) tablet 5 mg, 5 mg, Oral, Q12H, Maricel Peraza, APRN, 5 mg at 04/25/24 0840    aspirin chewable tablet 81 mg, 81 mg, Oral, Daily, Maricel Peraza APRN, 81 mg at 04/25/24 0840    atorvastatin (LIPITOR) tablet 20 mg, 20 mg, Oral, Nightly, Golden Madison MD, 20 mg at 04/24/24 2100    azelastine (ASTELIN) nasal spray 2 spray, 2 spray, Each Nare, BID, Maricel Peraza APRN, 2 spray at 04/27/24 2143    sennosides-docusate (PERICOLACE) 8.6-50 MG per tablet 2 tablet, 2 tablet, Oral, BID PRN **AND** polyethylene glycol (MIRALAX) packet 17 g, 17 g, Oral, Daily PRN, 17 g at 04/22/24 2225 **AND** [DISCONTINUED] bisacodyl (DULCOLAX) EC tablet 5 mg, 5 mg, Oral, Daily PRN, 5 mg at 04/22/24 2225 **AND** bisacodyl (DULCOLAX) suppository 10 mg, 10 mg, Rectal, Daily  PRN, Maricel Peraza APRN    [Held by provider] carvedilol (COREG) tablet 3.125 mg, 3.125 mg, Oral, BID With Meals, Maricel Peraza APRN, 3.125 mg at 04/22/24 2214    dextrose (D50W) (25 g/50 mL) IV injection 25 g, 25 g, Intravenous, Q15 Min PRN, Maricel Peraza APRN    EPINEPHrine 10 mg in 250 mL infusion, 0.02-0.3 mcg/kg/min, Intravenous, Titrated, Eyad Ledesma MD, Stopped at 04/25/24 1925    famotidine (PEPCID) tablet 20 mg, 20 mg, Oral, BID PRN, Maricel Peraza APRN, 20 mg at 04/22/24 2214    glucagon (GLUCAGEN) injection 1 mg, 1 mg, Intramuscular, Q15 Min PRN, Maricel Peraza APRN    insulin glargine (LANTUS, SEMGLEE) injection 8 Units, 8 Units, Subcutaneous, Nightly, Luz Elena Batista, DO, 8 Units at 04/27/24 2055    insulin regular (humuLIN R,novoLIN R) injection 2-9 Units, 2-9 Units, Subcutaneous, Q6H, Sarah Moreland, PharmD, 2 Units at 04/27/24 1804    lactobacillus acidophilus (RISAQUAD) capsule 1 capsule, 1 capsule, Oral, Daily, Maricel Peraza APRN, 1 capsule at 04/25/24 0840    levothyroxine (SYNTHROID, LEVOTHROID) tablet 88 mcg, 88 mcg, Oral, Q AM, Maricel Peraza APRN, 88 mcg at 04/25/24 0633    Magnesium Low Dose Replacement - Follow Nurse / BPA Driven Protocol, , Does not apply, PRN, Maricel Peraza APRN    midazolam (VERSED) injection 0.5 mg, 0.5 mg, Intravenous, Q4H PRN, Eyad Ledesma MD    midodrine (PROAMATINE) tablet 10 mg, 10 mg, Oral, TID AC, Golden Madison MD    morphine injection 1 mg, 1 mg, Intravenous, Q3H PRN, Eyad Ledesma MD, 1 mg at 04/28/24 0031    nitroglycerin (NITROSTAT) SL tablet 0.4 mg, 0.4 mg, Sublingual, Q5 Min PRN, Maricel Peraza, APRN    norepinephrine (LEVOPHED) 8 mg in 250 mL NS infusion (premix), 0.02-0.3 mcg/kg/min, Intravenous, Titrated, Eyad Ledesma MD, Last Rate: 1.46 mL/hr at 04/28/24 1038, 0.01 mcg/kg/min at 04/28/24 1038    pantoprazole (PROTONIX) injection 40 mg, 40 mg,  Intravenous, Q AM, Sarah Moreland, PharmD, 40 mg at 24 0529    phenylephrine (JENNIFER-SYNEPHRINE) 50 mg in sodium chloride 0.9 % 250 mL infusion, 0.5-3 mcg/kg/min, Intravenous, Titrated, Eyad Ledesma MD, Stopped at 24 1547    piperacillin-tazobactam (ZOSYN) 3.375 g IVPB in 100 mL NS MBP (CD), 3.375 g, Intravenous, Q8H, Eyad Ledesma MD, 3.375 g at 24 0539    polyethylene glycol (MIRALAX) packet 17 g, 17 g, Oral, Daily, Artur Avila MD, 17 g at 24 0901    prochlorperazine (COMPAZINE) injection 5 mg, 5 mg, Intravenous, Q6H PRN, Peraza, Maricel W, APRN, 2.5 mg at 24 1316    sodium chloride 0.9 % flush 10 mL, 10 mL, Intravenous, PRN, Peraza, Maricel W, APRN    sodium chloride 0.9 % flush 10 mL, 10 mL, Intravenous, Q12H, Peraza, Maricel W, APRN, 10 mL at 24 2055    tamsulosin (FLOMAX) 24 hr capsule 0.4 mg, 0.4 mg, Oral, Daily, Peraza, Maricel W, APRN, 0.4 mg at 24 0840    Antibiotics:  Anti-Infectives (From admission, onward)      Ordered     Dose/Rate Route Frequency Start Stop    24 1356  piperacillin-tazobactam (ZOSYN) 3.375 g IVPB in 100 mL NS MBP (CD)        Ordering Provider: Eyad Ledesma MD    3.375 g  over 4 Hours Intravenous Every 8 Hours 24 2200 24 2159    24 1356  piperacillin-tazobactam (ZOSYN) 3.375 g IVPB in 100 mL NS MBP (CD)        Ordering Provider: Eyad Ledesma MD    3.375 g  over 30 Minutes Intravenous Once 24 1445 24 1557              Review of Systems:  See HPI      Physical Exam:   Vital Signs  Temp (24hrs), Av.7 °F (36.5 °C), Min:97.3 °F (36.3 °C), Max:97.9 °F (36.6 °C)    Temp  Min: 97.3 °F (36.3 °C)  Max: 97.9 °F (36.6 °C)  BP  Min: 69/45  Max: 152/127  Pulse  Min: 59  Max: 79  Resp  Min: 18  Max: 34  SpO2  Min: 90 %  Max: 100 %    GENERAL:   More alert and responsive.  HEENT: Normocephalic, atraumatic.  PERRL. EOMI. No conjunctival injection. No icterus.  No labial  "ulcers  NECK: Supple   HEART:  irregular rate  LUNGS:   ABDOMEN: Soft, nontender, nondistended. No rebound or guarding. NO mass or HSM.  EXT:  No cyanosis, clubbing or edema. No cord.  :   Fonseca catheter in place.  MSK: No joint effusions or erythema  SKIN: Warm and dry without cutaneous eruptions on Inspection/palpation.    NEURO:  he is more alert and responsive.  He knows that he is at [facility] and thinks that the month is May.  He knows that the year is 2024.    Laboratory Data    Results from last 7 days   Lab Units 04/28/24  0405 04/27/24  0607 04/26/24  1230 04/26/24  0410   WBC 10*3/mm3 10.11 9.89  --  9.95   HEMOGLOBIN g/dL 8.0* 7.8* 8.2* 7.9*   HEMATOCRIT % 23.5* 23.4* 24.3* 23.7*   PLATELETS 10*3/mm3 183 150  --  158     Results from last 7 days   Lab Units 04/28/24  0405   SODIUM mmol/L 141   POTASSIUM mmol/L 4.5   CHLORIDE mmol/L 108*   CO2 mmol/L 17.0*   BUN mg/dL 84*   CREATININE mg/dL 2.65*   GLUCOSE mg/dL 157*   CALCIUM mg/dL 8.0*     Results from last 7 days   Lab Units 04/28/24  0405   ALK PHOS U/L 85   BILIRUBIN mg/dL 0.6   ALT (SGPT) U/L 450*   AST (SGOT) U/L 76*                         Estimated Creatinine Clearance: 27 mL/min (A) (by C-G formula based on SCr of 2.65 mg/dL (H)).      Microbiology:  No results found for: \"ACANTHNAEG\", \"AFBCX\", \"BPERTUSSISCX\", \"BLOODCX\"  No results found for: \"BCIDPCR\", \"CXREFLEX\", \"CSFCX\", \"CULTURETIS\"  No results found for: \"CULTURES\", \"HSVCX\", \"URCX\"  No results found for: \"EYECULTURE\", \"GCCX\", \"HSVCULTURE\", \"LABHSV\"  No results found for: \"LEGIONELLA\", \"MRSACX\", \"MUMPSCX\", \"MYCOPLASCX\"  No results found for: \"NOCARDIACX\", \"STOOLCX\"  Urine Culture   Date Value Ref Range Status   04/22/2024 No growth  Preliminary     No results found for: \"VIRALCULTU\", \"WOUNDCX\"        Radiology:  Imaging Results (Last 72 Hours)       Procedure Component Value Units Date/Time    SLP FEES - Fiberoptic Endo Eval Swallow [[id number]] Resulted: 04/28/24 1331     " Updated: 04/28/24 1331    XR Chest 1 View [[id number]] Collected: 04/28/24 1013     Updated: 04/28/24 1019    Narrative:      XR CHEST 1 VW    Date of Exam: 4/28/2024 4:30 AM EDT    Indication: Hypoxia, aspiration    Comparison: 4/27/2024    Findings:  Heart size and pulmonary vasculature are stable. There is been resolution of right upper lobe airspace disease. The lungs are clear other than mild atelectasis in the lung bases. Costophrenic angles are sharp      Impression:      Impression:    1. Interval improvement in right upper lobe airspace disease.    2. Bibasilar atelectasis      Electronically Signed: Miguel Brandy    4/28/2024 10:16 AM EDT    Workstation ID: [id number]    XR Chest 1 View [[id number]] Collected: 04/27/24 0209     Updated: 04/27/24 0214    Narrative:      XR CHEST 1 VW    Date of Exam: 4/27/2024 1:02 AM EDT    Indication: resp failure    Comparison: Chest radiograph dated April 26, 2024 1307 hours    Findings:  There are postoperative changes from midline sternotomy and coronary artery bypass grafting. There is a left-sided subclavian pacemaker device. Pulmonary vascular markings are normal. There is patchy right upper lobe airspace disease. The left lung is   clear.      Impression:      Impression:  Mild right upper lobe airspace disease which could be from pneumonia.    Electronically Signed: Golden Sullivan MD    4/27/2024 2:11 AM EDT    Workstation ID: [id number]    XR Chest 1 View [[id number]] Collected: 04/26/24 1325     Updated: 04/26/24 1329    Narrative:      XR CHEST 1 VW    Date of Exam: 4/26/2024 12:48 PM EDT    Indication: resp failure    Comparison: Earlier the same day.    Findings:  Interval extubation. There is no new focal opacity. There is no enlarging effusion or distinct pneumothorax. Unchanged mild cardiac enlargement.      Impression:      Impression:  Interval extubation with remainder of exam unchanged      Electronically Signed: Christian Wang MD    4/26/2024 1:26 PM EDT     Workstation ID: [id number]    XR Chest 1 View [[id number]] Collected: 04/26/24 0542     Updated: 04/26/24 0546    Narrative:      XR CHEST 1 VW    Date of Exam: 4/26/2024 3:11 AM EDT    Indication: Respiratory failure, cardiac arrest    Comparison: 1 day prior.    Findings:  Support hardware projects unchanged. There is no new focal airspace opacity. No significant pleural effusion or distinct pneumothorax. Unchanged mild cardiac enlargement.      Impression:      Impression:  No significant interval change.      Electronically Signed: Christian Wang MD    4/26/2024 5:43 AM EDT    Workstation ID: [id number]    XR Chest 1 View [[id number]] Collected: 04/25/24 1517     Updated: 04/25/24 1522    Narrative:      XR CHEST 1 VW    Date of Exam: 4/25/2024 2:42 PM EDT    Indication: intubation    Comparison: 4/22/2024.    Findings:  Endotracheal tube projects in good position. Left chest wall ICD projects unchanged. There is no significant pleural effusion or distinct in the thorax. No new focal airspace consolidation. Some stable mild linear atelectasis is noted.      Impression:      Impression:  Endotracheal tube projects in good position. Left chest wall ICD projects unchanged. There is no significant pleural effusion or distinct in the thorax. No new focal airspace consolidation. Some stable mild linear atelectasis is noted.        Electronically Signed: Christian Wang MD    4/25/2024 3:19 PM EDT    Workstation ID: [id number]          I read his chest x-ray of 4/28      Impression:    1.    Right upper lobe aspiration pneumonia-he is now on Zosyn therapy   2.   V-fib arrest-status post resuscitation.  He is on amiodarone   3.  Right fifth metatarsal wound/bullous lesion-without evidence of cellulitis   4.  Chronic right pretibial dermatitis-without evidence of cellulitis  5.  Type 2 diabetes mellitus  6.  coronary artery disease/status post CABG, 3/22/2019  7.  Severe systolic heart failure-systolic function is severely  impaired with an ejection fraction of less than 20%.  8.  Stage IIIb chronic kidney disease  9.  Paroxysmal atrial fibrillation  10.  Acute hypoxic respiratory failure-  Improved today with the decreased oxygen demand.  11.  Ischemic hepatitis-improving  12.  Pyuria- with a negative urine culture.  13.  Anoxic Encephalopathy-this partially improved today.    PLAN/RECOMMENDATIONS:   1.  Offload the right fifth metatarsal wound  2.  Intravenous Zosyn      He has partially improved today.  I discussed his complex situation with his daughter and wife today.    Lincoln Padilla MD  4/28/2024  13:31 EDT

## 2024-04-29 LAB
ALBUMIN SERPL-MCNC: 2.8 G/DL (ref 3.5–5.2)
ALBUMIN/GLOB SERPL: 1.2 G/DL
ALP SERPL-CCNC: 92 U/L (ref 39–117)
ALT SERPL W P-5'-P-CCNC: 320 U/L (ref 1–41)
ANION GAP SERPL CALCULATED.3IONS-SCNC: 11 MMOL/L (ref 5–15)
AST SERPL-CCNC: 38 U/L (ref 1–40)
BASOPHILS # BLD AUTO: 0.02 10*3/MM3 (ref 0–0.2)
BASOPHILS NFR BLD AUTO: 0.2 % (ref 0–1.5)
BH CV ECHO MEAS - AO MAX PG: 3.5 MMHG
BH CV ECHO MEAS - AO MEAN PG: 2.1 MMHG
BH CV ECHO MEAS - AO ROOT DIAM: 3.2 CM
BH CV ECHO MEAS - AO V2 MAX: 93 CM/SEC
BH CV ECHO MEAS - AO V2 VTI: 14.5 CM
BH CV ECHO MEAS - EF(MOD-BP): 10 %
BH CV ECHO MEAS - IVS/LVPW: 0.86 CM
BH CV ECHO MEAS - IVSD: 1 CM
BH CV ECHO MEAS - LA DIMENSION: 4.6 CM
BH CV ECHO MEAS - LAT PEAK E' VEL: 5 CM/SEC
BH CV ECHO MEAS - LV MAX PG: 0.86 MMHG
BH CV ECHO MEAS - LV MEAN PG: 0.4 MMHG
BH CV ECHO MEAS - LV V1 MAX: 46 CM/SEC
BH CV ECHO MEAS - LV V1 VTI: 6.6 CM
BH CV ECHO MEAS - LVIDD: 5.9 CM
BH CV ECHO MEAS - LVIDS: 5.2 CM
BH CV ECHO MEAS - LVPWD: 1.16 CM
BH CV ECHO MEAS - MED PEAK E' VEL: 4 CM/SEC
BH CV ECHO MEAS - MV E MAX VEL: 22 CM/SEC
BH CV ECHO MEAS - MV MAX PG: 2.6 MMHG
BH CV ECHO MEAS - MV MEAN PG: 0.9 MMHG
BH CV ECHO MEAS - MV V2 VTI: 17 CM
BH CV ECHO MEAS - PA ACC TIME: 0.06 SEC
BH CV ECHO MEAS - PA V2 MAX: 66 CM/SEC
BH CV ECHO MEAS - RAP SYSTOLE: 8 MMHG
BH CV ECHO MEAS - RVSP: 39 MMHG
BH CV ECHO MEAS - TAPSE (>1.6): 0.8 CM
BH CV ECHO MEAS - TR MAX PG: 31.8 MMHG
BH CV ECHO MEAS - TR MAX VEL: 282 CM/SEC
BH CV ECHO MEASUREMENTS AVERAGE E/E' RATIO: 4.89
BH CV XLRA - RV BASE: 4 CM
BH CV XLRA - RV LENGTH: 9.2 CM
BH CV XLRA - RV MID: 2.9 CM
BH CV XLRA - TDI S': 7 CM/SEC
BILIRUB SERPL-MCNC: 0.6 MG/DL (ref 0–1.2)
BUN SERPL-MCNC: 87 MG/DL (ref 8–23)
BUN/CREAT SERPL: 34.5 (ref 7–25)
CALCIUM SPEC-SCNC: 7.8 MG/DL (ref 8.6–10.5)
CHLORIDE SERPL-SCNC: 112 MMOL/L (ref 98–107)
CO2 SERPL-SCNC: 19 MMOL/L (ref 22–29)
CORTIS SERPL-MCNC: 11.1 MCG/DL
CREAT SERPL-MCNC: 2.52 MG/DL (ref 0.76–1.27)
DEPRECATED RDW RBC AUTO: 44.5 FL (ref 37–54)
EGFRCR SERPLBLD CKD-EPI 2021: 25.3 ML/MIN/1.73
EOSINOPHIL # BLD AUTO: 0.11 10*3/MM3 (ref 0–0.4)
EOSINOPHIL NFR BLD AUTO: 1.2 % (ref 0.3–6.2)
ERYTHROCYTE [DISTWIDTH] IN BLOOD BY AUTOMATED COUNT: 14 % (ref 12.3–15.4)
GLOBULIN UR ELPH-MCNC: 2.3 GM/DL
GLUCOSE BLDC GLUCOMTR-MCNC: 149 MG/DL (ref 70–130)
GLUCOSE BLDC GLUCOMTR-MCNC: 151 MG/DL (ref 70–130)
GLUCOSE BLDC GLUCOMTR-MCNC: 161 MG/DL (ref 70–130)
GLUCOSE BLDC GLUCOMTR-MCNC: 176 MG/DL (ref 70–130)
GLUCOSE BLDC GLUCOMTR-MCNC: 178 MG/DL (ref 70–130)
GLUCOSE BLDC GLUCOMTR-MCNC: 192 MG/DL (ref 70–130)
GLUCOSE BLDC GLUCOMTR-MCNC: 235 MG/DL (ref 70–130)
GLUCOSE SERPL-MCNC: 170 MG/DL (ref 65–99)
HCT VFR BLD AUTO: 23.8 % (ref 37.5–51)
HGB BLD-MCNC: 7.9 G/DL (ref 13–17.7)
IMM GRANULOCYTES # BLD AUTO: 0.09 10*3/MM3 (ref 0–0.05)
IMM GRANULOCYTES NFR BLD AUTO: 1 % (ref 0–0.5)
IVRT: 48 MS
LEFT ATRIUM VOLUME INDEX: 14 ML/M2
LYMPHOCYTES # BLD AUTO: 0.85 10*3/MM3 (ref 0.7–3.1)
LYMPHOCYTES NFR BLD AUTO: 9.2 % (ref 19.6–45.3)
MCH RBC QN AUTO: 29.4 PG (ref 26.6–33)
MCHC RBC AUTO-ENTMCNC: 33.2 G/DL (ref 31.5–35.7)
MCV RBC AUTO: 88.5 FL (ref 79–97)
MONOCYTES # BLD AUTO: 0.99 10*3/MM3 (ref 0.1–0.9)
MONOCYTES NFR BLD AUTO: 10.7 % (ref 5–12)
NEUTROPHILS NFR BLD AUTO: 7.22 10*3/MM3 (ref 1.7–7)
NEUTROPHILS NFR BLD AUTO: 77.7 % (ref 42.7–76)
NRBC BLD AUTO-RTO: 0 /100 WBC (ref 0–0.2)
PLATELET # BLD AUTO: 205 10*3/MM3 (ref 140–450)
PMV BLD AUTO: 11 FL (ref 6–12)
POTASSIUM SERPL-SCNC: 4 MMOL/L (ref 3.5–5.2)
PROT SERPL-MCNC: 5.1 G/DL (ref 6–8.5)
QT INTERVAL: 514 MS
QTC INTERVAL: 578 MS
RBC # BLD AUTO: 2.69 10*6/MM3 (ref 4.14–5.8)
SODIUM SERPL-SCNC: 142 MMOL/L (ref 136–145)
WBC NRBC COR # BLD AUTO: 9.28 10*3/MM3 (ref 3.4–10.8)

## 2024-04-29 PROCEDURE — 29580 STRAPPING UNNA BOOT: CPT

## 2024-04-29 PROCEDURE — 99291 CRITICAL CARE FIRST HOUR: CPT | Performed by: INTERNAL MEDICINE

## 2024-04-29 PROCEDURE — 94664 DEMO&/EVAL PT USE INHALER: CPT

## 2024-04-29 PROCEDURE — 25010000002 PIPERACILLIN SOD-TAZOBACTAM PER 1 G: Performed by: INTERNAL MEDICINE

## 2024-04-29 PROCEDURE — 63710000001 INSULIN REGULAR HUMAN PER 5 UNITS

## 2024-04-29 PROCEDURE — 94799 UNLISTED PULMONARY SVC/PX: CPT

## 2024-04-29 PROCEDURE — 97168 OT RE-EVAL EST PLAN CARE: CPT

## 2024-04-29 PROCEDURE — 97164 PT RE-EVAL EST PLAN CARE: CPT

## 2024-04-29 PROCEDURE — 97530 THERAPEUTIC ACTIVITIES: CPT

## 2024-04-29 PROCEDURE — 63710000001 INSULIN LISPRO (HUMAN) PER 5 UNITS: Performed by: INTERNAL MEDICINE

## 2024-04-29 PROCEDURE — 63710000001 INSULIN GLARGINE PER 5 UNITS: Performed by: FAMILY MEDICINE

## 2024-04-29 PROCEDURE — 25010000002 HEPARIN (PORCINE) PER 1000 UNITS: Performed by: INTERNAL MEDICINE

## 2024-04-29 PROCEDURE — 85025 COMPLETE CBC W/AUTO DIFF WBC: CPT | Performed by: INTERNAL MEDICINE

## 2024-04-29 PROCEDURE — P9041 ALBUMIN (HUMAN),5%, 50ML: HCPCS | Performed by: INTERNAL MEDICINE

## 2024-04-29 PROCEDURE — 80053 COMPREHEN METABOLIC PANEL: CPT | Performed by: INTERNAL MEDICINE

## 2024-04-29 PROCEDURE — 82948 REAGENT STRIP/BLOOD GLUCOSE: CPT

## 2024-04-29 PROCEDURE — 25010000002 ALBUMIN HUMAN 5% PER 50 ML: Performed by: INTERNAL MEDICINE

## 2024-04-29 PROCEDURE — 82533 TOTAL CORTISOL: CPT | Performed by: INTERNAL MEDICINE

## 2024-04-29 PROCEDURE — 97597 DBRDMT OPN WND 1ST 20 CM/<: CPT

## 2024-04-29 RX ORDER — ALBUMIN, HUMAN INJ 5% 5 %
250 SOLUTION INTRAVENOUS ONCE
Status: COMPLETED | OUTPATIENT
Start: 2024-04-29 | End: 2024-04-29

## 2024-04-29 RX ORDER — CASTOR OIL AND BALSAM, PERU 788; 87 MG/G; MG/G
1 OINTMENT TOPICAL EVERY 12 HOURS SCHEDULED
Status: DISCONTINUED | OUTPATIENT
Start: 2024-04-29 | End: 2024-05-01

## 2024-04-29 RX ORDER — AMOXICILLIN 250 MG
2 CAPSULE ORAL 2 TIMES DAILY
Status: DISCONTINUED | OUTPATIENT
Start: 2024-04-29 | End: 2024-05-08 | Stop reason: HOSPADM

## 2024-04-29 RX ORDER — HEPARIN SODIUM 5000 [USP'U]/ML
5000 INJECTION, SOLUTION INTRAVENOUS; SUBCUTANEOUS EVERY 8 HOURS SCHEDULED
Status: DISCONTINUED | OUTPATIENT
Start: 2024-04-29 | End: 2024-05-02

## 2024-04-29 RX ORDER — INSULIN LISPRO 100 [IU]/ML
2-9 INJECTION, SOLUTION INTRAVENOUS; SUBCUTANEOUS
Status: DISCONTINUED | OUTPATIENT
Start: 2024-04-29 | End: 2024-05-05

## 2024-04-29 RX ORDER — POLYETHYLENE GLYCOL 3350 17 G/17G
17 POWDER, FOR SOLUTION ORAL DAILY PRN
Status: DISCONTINUED | OUTPATIENT
Start: 2024-04-29 | End: 2024-05-04

## 2024-04-29 RX ORDER — BISACODYL 10 MG
10 SUPPOSITORY, RECTAL RECTAL DAILY PRN
Status: DISCONTINUED | OUTPATIENT
Start: 2024-04-29 | End: 2024-05-04

## 2024-04-29 RX ORDER — PANTOPRAZOLE SODIUM 40 MG/1
40 TABLET, DELAYED RELEASE ORAL
Status: DISCONTINUED | OUTPATIENT
Start: 2024-04-30 | End: 2024-05-08 | Stop reason: HOSPADM

## 2024-04-29 RX ORDER — BISACODYL 5 MG/1
5 TABLET, DELAYED RELEASE ORAL DAILY PRN
Status: DISCONTINUED | OUTPATIENT
Start: 2024-04-29 | End: 2024-05-04

## 2024-04-29 RX ORDER — ALBUMIN, HUMAN INJ 5% 5 %
500 SOLUTION INTRAVENOUS ONCE
Status: DISCONTINUED | OUTPATIENT
Start: 2024-04-29 | End: 2024-04-29

## 2024-04-29 RX ADMIN — PIPERACILLIN AND TAZOBACTAM 3.38 G: 3; .375 INJECTION, POWDER, LYOPHILIZED, FOR SOLUTION INTRAVENOUS at 13:21

## 2024-04-29 RX ADMIN — INSULIN LISPRO 2 UNITS: 100 INJECTION, SOLUTION INTRAVENOUS; SUBCUTANEOUS at 17:56

## 2024-04-29 RX ADMIN — AZELASTINE HYDROCHLORIDE 2 SPRAY: 137 SPRAY, METERED NASAL at 08:05

## 2024-04-29 RX ADMIN — TAMSULOSIN HYDROCHLORIDE 0.4 MG: 0.4 CAPSULE ORAL at 08:05

## 2024-04-29 RX ADMIN — MIDODRINE HYDROCHLORIDE 10 MG: 10 TABLET ORAL at 21:12

## 2024-04-29 RX ADMIN — HEPARIN SODIUM 5000 UNITS: 5000 INJECTION INTRAVENOUS; SUBCUTANEOUS at 21:12

## 2024-04-29 RX ADMIN — INSULIN LISPRO 2 UNITS: 100 INJECTION, SOLUTION INTRAVENOUS; SUBCUTANEOUS at 21:12

## 2024-04-29 RX ADMIN — INSULIN GLARGINE 8 UNITS: 100 INJECTION, SOLUTION SUBCUTANEOUS at 20:24

## 2024-04-29 RX ADMIN — ALBUMIN (HUMAN) 250 ML: 12.5 INJECTION, SOLUTION INTRAVENOUS at 14:26

## 2024-04-29 RX ADMIN — POLYETHYLENE GLYCOL 3350 17 G: 17 POWDER, FOR SOLUTION ORAL at 08:02

## 2024-04-29 RX ADMIN — Medication 10 ML: at 08:03

## 2024-04-29 RX ADMIN — MIDODRINE HYDROCHLORIDE 10 MG: 10 TABLET ORAL at 08:02

## 2024-04-29 RX ADMIN — MIDODRINE HYDROCHLORIDE 10 MG: 10 TABLET ORAL at 13:22

## 2024-04-29 RX ADMIN — ALBUTEROL SULFATE 2.5 MG: 2.5 SOLUTION RESPIRATORY (INHALATION) at 20:04

## 2024-04-29 RX ADMIN — ALBUTEROL SULFATE 2.5 MG: 2.5 SOLUTION RESPIRATORY (INHALATION) at 08:01

## 2024-04-29 RX ADMIN — PIPERACILLIN AND TAZOBACTAM 3.38 G: 3; .375 INJECTION, POWDER, LYOPHILIZED, FOR SOLUTION INTRAVENOUS at 21:46

## 2024-04-29 RX ADMIN — ATORVASTATIN CALCIUM 20 MG: 20 TABLET, FILM COATED ORAL at 20:24

## 2024-04-29 RX ADMIN — Medication 10 ML: at 08:05

## 2024-04-29 RX ADMIN — ACETYLCYSTEINE 3 ML: 200 SOLUTION ORAL; RESPIRATORY (INHALATION) at 20:04

## 2024-04-29 RX ADMIN — PIPERACILLIN AND TAZOBACTAM 3.38 G: 3; .375 INJECTION, POWDER, LYOPHILIZED, FOR SOLUTION INTRAVENOUS at 06:04

## 2024-04-29 RX ADMIN — Medication 10 ML: at 20:24

## 2024-04-29 RX ADMIN — HEPARIN SODIUM 5000 UNITS: 5000 INJECTION INTRAVENOUS; SUBCUTANEOUS at 13:22

## 2024-04-29 RX ADMIN — INSULIN HUMAN 4 UNITS: 100 INJECTION, SOLUTION PARENTERAL at 00:34

## 2024-04-29 RX ADMIN — INSULIN LISPRO 2 UNITS: 100 INJECTION, SOLUTION INTRAVENOUS; SUBCUTANEOUS at 12:04

## 2024-04-29 RX ADMIN — ALBUMIN (HUMAN) 250 ML: 12.5 INJECTION, SOLUTION INTRAVENOUS at 15:23

## 2024-04-29 RX ADMIN — Medication 1 APPLICATION: at 20:23

## 2024-04-29 RX ADMIN — ACETYLCYSTEINE 3 ML: 200 SOLUTION ORAL; RESPIRATORY (INHALATION) at 08:12

## 2024-04-29 RX ADMIN — SENNOSIDES AND DOCUSATE SODIUM 2 TABLET: 8.6; 5 TABLET ORAL at 20:24

## 2024-04-29 RX ADMIN — ASPIRIN 81 MG CHEWABLE TABLET 81 MG: 81 TABLET CHEWABLE at 08:02

## 2024-04-29 RX ADMIN — INSULIN HUMAN 2 UNITS: 100 INJECTION, SOLUTION PARENTERAL at 06:03

## 2024-04-29 RX ADMIN — HYDROCODONE BITARTRATE AND ACETAMINOPHEN 1 TABLET: 5; 325 TABLET ORAL at 08:18

## 2024-04-29 RX ADMIN — HYDROCODONE BITARTRATE AND ACETAMINOPHEN 1 TABLET: 5; 325 TABLET ORAL at 23:21

## 2024-04-29 RX ADMIN — Medication 1 CAPSULE: at 08:02

## 2024-04-29 RX ADMIN — HYDROCODONE BITARTRATE AND ACETAMINOPHEN 1 TABLET: 5; 325 TABLET ORAL at 13:28

## 2024-04-29 RX ADMIN — LEVOTHYROXINE SODIUM 88 MCG: 88 TABLET ORAL at 06:04

## 2024-04-29 RX ADMIN — HYDROCODONE BITARTRATE AND ACETAMINOPHEN 1 TABLET: 5; 325 TABLET ORAL at 19:45

## 2024-04-29 RX ADMIN — PANTOPRAZOLE SODIUM 40 MG: 40 INJECTION, POWDER, FOR SOLUTION INTRAVENOUS at 06:04

## 2024-04-29 NOTE — CASE MANAGEMENT/SOCIAL WORK
Continued Stay Note   [place]     Patient Name: Jermaine Singh  MRN: [id number]  Today's Date: 4/29/2024    Admit Date: 4/22/2024    Plan: ongoing   Discharge Plan       Row Name 04/29/24 1558       Plan    Plan ongoing    Patient/Family in Agreement with Plan other (see comments)    Plan Comments Discussed in MDR, therapy reordered, continue to rec rehab @ d/c. Remains on levo. Family in and out today. I will f/u w/spouse on Tuesday. ID following. CM will continue to follow.    Final Discharge Disposition Code 30 - still a patient                   Discharge Codes    No documentation.                 Expected Discharge Date and Time       Expected Discharge Date Expected Discharge Time    May 2, 2024               Reilly Davis RN

## 2024-04-29 NOTE — THERAPY RE-EVALUATION
Patient Name: Jermaine Singh  : 1945    MRN: [id number]                              Today's Date: 2024       Admit Date: 2024    Visit Dx:     ICD-10-CM ICD-9-CM   1. Acute UTI  N39.0 599.0   2. Generalized weakness  R53.1 780.79   3. Edema of right lower extremity  R60.0 782.3   4. Leg erythema  L53.9 695.9   5. Chronic kidney disease, unspecified CKD stage  N18.9 585.9   6. Elevated troponin  R79.89 790.6   7. Hyperkalemia  E87.5 276.7   8. Cardiac arrest  I46.9 427.5   9. Oropharyngeal dysphagia  R13.12 787.22     Patient Active Problem List   Diagnosis    Type 2 diabetes mellitus with hyperglycemia    Hyperlipidemia LDL goal <70    Hypothyroidism (acquired)    Vitamin D deficiency on Rx     Diabetic neuropathy    Coronary artery disease involving native coronary artery of native heart with angina pectoris    Atherosclerosis of native artery of both lower extremities with intermittent claudication    Pacemaker    HFrEF (heart failure with reduced ejection fraction)    DVT, bilateral lower limbs    Cellulitis of right lower extremity    Anemia, chronic disease    PVD (peripheral vascular disease)    Diabetic foot ulcer    GERD without esophagitis    Stage 3b chronic kidney disease    Right inguinal hernia    PAF (paroxysmal atrial fibrillation)    Moderate malnutrition    UTI (urinary tract infection)    Cardiac arrest    Acute respiratory failure with hypoxia    History of DVT (deep vein thrombosis)    Elevated troponin level not due myocardial infarction     Past Medical History:   Diagnosis Date    Allergic     Arthritis     Asthma     Coronary artery disease     Diabetes mellitus 2000    started on inuslin 2018; started on po meds in ; checking blood sugars daily     Diabetic foot infection 2023    Disease of thyroid gland     po meds daily for hypothyroidism     Elevated serum creatinine 11/15/2022    Elevated troponin 10/02/2022    Generalized weakness 11/15/2022     History of fracture as a child     rt leg- severe     Hyperlipidemia     Hypertension     Hypothyroidism     PAF (paroxysmal atrial fibrillation) 07/23/2023    Peripheral neuropathy     Peripheral vascular disease     s/p angiogram 2/19-needs stent in left leg     Pleural effusion, bilateral 11/15/2022    Proximal phalanx fracture of the second digit extending into the second metatarsal joint 07/28/2022    RBBB     Right knee pain     Status post amputation of great toe and second toe of left foot 07/23/2023    Status post amputation of great toe, left 11/15/2022    Unstable angina 02/12/2019    Added automatically from request for surgery [id number]    Vitamin D deficiency      Past Surgical History:   Procedure Laterality Date    AMPUTATION DIGIT Left 01/17/2023    Procedure: AMPUTATION DIGIT LEFT;  Surgeon: Gopal Márquez MD;  Location:  [facility] OR;  Service: Vascular;  Laterality: Left;    APPENDECTOMY      CARDIAC CATHETERIZATION N/A 02/14/2019    Procedure: Left Heart Cath;  Surgeon: Cooper Apodaca MD;  Location: [facility] CATH INVASIVE LOCATION;  Service: Cardiology    CARDIAC ELECTROPHYSIOLOGY PROCEDURE N/A 05/18/2022    Procedure: DEVICE IMPLANT;  Surgeon: Cooper Apodaca MD;  Location: [facility] CATH INVASIVE LOCATION;  Service: Cardiology;  Laterality: N/A;    CARDIAC SURGERY      COLONOSCOPY      CORONARY ARTERY BYPASS GRAFT N/A 03/22/2019    Procedure: MEDIAN STERNOTOMY, CORONARY ARTERY BYPASS GRAFT X3, UTILIZING THE LEFT INTERNAL MAMMARY ARTERY, EVH AND OPEN HARVEST OF THE RIGHT GREATER SAPHENOUS VEIN, EXPLORATION OF THE LEFT LEG;  Surgeon: Ap Au MD;  Location: [facility] OR;  Service: Cardiothoracic    EYE SURGERY Bilateral     cataracts     FRACTURE SURGERY      INTERVENTIONAL RADIOLOGY PROCEDURE N/A 07/29/2021    Procedure: Abdominal Aortagram with Runoff;  Surgeon: Jermaine Hernandez MD;  Location: [facility] CATH INVASIVE LOCATION;  Service: Cardiovascular;  Laterality: N/A;    KNEE  ARTHROSCOPY      right x 2, left x 1    LACERATION REPAIR      right leg    LEG SURGERY      2 for fracture of rt leg     TONSILLECTOMY      Adnoidectomy    TRANS METATARSAL AMPUTATION Left 08/02/2022    Procedure: GREAT TOE AMPUTATION LEFT;  Surgeon: Gopal Márquez MD;  Location: [facility];  Service: Vascular;  Laterality: Left;      General Information       [facility] Name 04/29/24 1057          OT Time and Intention    Document Type re-evaluation  -     Mode of Treatment occupational therapy  -[facility] Name 04/29/24 1057          General Information    Patient Profile Reviewed yes  -     Prior Level of Function independent:;all household mobility;community mobility;gait;transfer;bed mobility;max assist:;ADL's  Per SO, pt using FWW for short distances; maxA for ADLs  -     Existing Precautions/Restrictions fall;cardiac;oxygen therapy device and L/min;other (see comments)  RLE wounds  -     Barriers to Rehab medically complex;previous functional deficit  -[facility] Name 04/29/24 1057          Living Environment    People in Home spouse  -[facility] Name 04/29/24 1057          Home Main Entrance    Number of Stairs, Main Entrance three  -     Stair Railings, Main Entrance railing on right side (ascending)  -[facility] Name 04/29/24 1057          Stairs Within Home, Primary    Number of Stairs, Within Home, Primary none  -[facility] Name 04/29/24 1057          Cognition    Orientation Status (Cognition) oriented x 3;oriented to;situation  -[facility] Name 04/29/24 1057          Safety Issues, Functional Mobility    Safety Issues Affecting Function (Mobility) awareness of need for assistance;insight into deficits/self-awareness;judgment;problem-solving;safety precaution awareness;safety precautions follow-through/compliance;sequencing abilities  -     Impairments Affecting Function (Mobility) balance;endurance/activity tolerance;strength;postural/trunk control;pain;motor control;motor  planning;shortness of breath  -               User Key  (r) = Recorded By, (t) = Taken By, (c) = Cosigned By      Initials Name Provider Type    Naye Davila OT Occupational Therapist                   Lymphedema       Row Name 04/29/24 0815             Lymphedema Edema Assessment    Ptting Edema Category By severity  -      Pitting Edema Mild  -      Recorded by [MF] Cooper Ni, PT              Compression/Skin Care    Compression/Skin Care skin care;wrapping location;bandaging  -      Skin Care washed/dried;lotion applied  -      Wrapping Location lower extremity  -      Wrapping Location LE bilateral:;foot to knee  -      Wrapping Comments RLE unna boot with coban and spandage  -      Recorded by [MF] Cooper Ni, PT                User Key  (r) = Recorded By, (t) = Taken By, (c) = Cosigned By      Initials Name Effective Dates     Cooper Ni, PT 02/03/23 -                    Mobility/ADL's       Row Name 04/29/24 1059          Bed Mobility    Bed Mobility supine-sit;sit-supine  -     Supine-Sit [place] (Bed Mobility) maximum assist (25% patient effort);2 person assist;verbal cues;nonverbal cues (demo/gesture)  -     Sit-Supine [place] (Bed Mobility) dependent (less than 25% patient effort);2 person assist;supervision;verbal cues  -     Bed Mobility, Safety Issues decreased use of arms for pushing/pulling;decreased use of legs for bridging/pushing;impaired trunk control for bed mobility  -     Assistive Device (Bed Mobility) head of bed elevated;draw sheet;bed rails  -     Comment, (Bed Mobility) BP 76/51 at EOB returned to supine and RN notified  -       Row Name 04/29/24 1059          Transfers    Comment, (Transfers) Deferred  -       Row Name 04/29/24 1059          Activities of Daily Living    BADL Assessment/Intervention lower body dressing  -       Row Name 04/29/24 1059          Lower Body Dressing Assessment/Training    [place]  Level (Lower Body Dressing) don;socks;dependent (less than 25% patient effort)  -     Position (Lower Body Dressing) supine  -               User Key  (r) = Recorded By, (t) = Taken By, (c) = Cosigned By      Initials Name Provider Type    Naye Davila OT Occupational Therapist                   Obj/Interventions       Row Name 04/29/24 1100          Sensory Assessment (Somatosensory)    Sensory Assessment (Somatosensory) UE sensation intact  -KL       Row Name 04/29/24 1100          Range of Motion Comprehensive    Comment, General Range of Motion unable to assess d/t chest pain; will continue to assess throughout therapy sessions  -KL       Row Name 04/29/24 1100          Strength Comprehensive (MMT)    Comment, General Manual Muscle Testing (MMT) Assessment Deferred d/t severe chest pains  -KL       Row Name 04/29/24 1100          Balance    Static Sitting Balance minimal assist;2-person assist  -     Dynamic Sitting Balance moderate assist;2-person assist  -     Position, Sitting Balance supported;sitting edge of bed  -               User Key  (r) = Recorded By, (t) = Taken By, (c) = Cosigned By      Initials Name Provider Type    Naye Davila OT Occupational Therapist                   Goals/Plan       Row Name 04/29/24 1129          Bed Mobility Goal 1 (OT)    Activity/Assistive Device (Bed Mobility Goal 1, OT) sit to supine/supine to sit  -     [place] Level/Cues Needed (Bed Mobility Goal 1, OT) minimum assist (75% or more patient effort)  -     Time Frame (Bed Mobility Goal 1, OT) long term goal (LTG);10 days  -     Progress/Outcomes (Bed Mobility Goal 1, OT) goal revised this date  -KL       Row Name 04/29/24 1129          Transfer Goal 1 (OT)    Activity/Assistive Device (Transfer Goal 1, OT) sit-to-stand/stand-to-sit;bed-to-chair/chair-to-bed;toilet  -     [place] Level/Cues Needed (Transfer Goal 1, OT) moderate assist (50-74% patient effort)  -     Time Frame (Transfer  Goal 1, OT) long term goal (LTG);10 days  -     Progress/Outcome (Transfer Goal 1, OT) goal revised this date  -       Row Name 04/29/24 1129          Dressing Goal 1 (OT)    Activity/Device (Dressing Goal 1, OT) lower body dressing;long-handled shoe horn;reacher;sock-aid  -     [place]/Cues Needed (Dressing Goal 1, OT) moderate assist (50-74% patient effort)  -     Time Frame (Dressing Goal 1, OT) long term goal (LTG);10 days  -     Progress/Outcome (Dressing Goal 1, OT) goal revised this date  -       Row Name 04/29/24 1129          Toileting Goal 1 (OT)    Activity/Device (Toileting Goal 1, OT) adjust/manage clothing;perform perineal hygiene;commode  -     [place] Level/Cues Needed (Toileting Goal 1, OT) moderate assist (50-74% patient effort)  -     Time Frame (Toileting Goal 1, OT) long term goal (LTG);10 days  -     Progress/Outcome (Toileting Goal 1, OT) goal revised this date  -       Row Name 04/29/24 1129          Therapy Assessment/Plan (OT)    Planned Therapy Interventions (OT) activity tolerance training;adaptive equipment training;functional balance retraining;occupation/activity based interventions;patient/caregiver education/training;ROM/therapeutic exercise;transfer/mobility retraining;strengthening exercise  -               User Key  (r) = Recorded By, (t) = Taken By, (c) = Cosigned By      Initials Name Provider Type    Naye Davila OT Occupational Therapist                   Clinical Impression       Row Name 04/29/24 1126          Pain Scale: FACES Pre/Post-Treatment    Pain: FACES Scale, Pretreatment 6-->hurts even more  -     Posttreatment Pain Rating 6-->hurts even more  -     Pain Location - chest  -       Row Name 04/29/24 1126          Plan of Care Review    Plan of Care Reviewed With patient;spouse  -     Progress no change  -     Outcome Evaluation Pt presents to OT re-eval post cardiac arrest. Pt presents w/ generalized weakness, pain, poor  balance and mobility. Will adjust goals to meet current abilities. d/c rec is IPR.  -       Row Name 04/29/24 1126          Therapy Assessment/Plan (OT)    Patient/Family Therapy Goal Statement (OT) Return to PLOF  -     Rehab Potential (OT) good, to achieve stated therapy goals  -     Criteria for Skilled Therapeutic Interventions Met (OT) yes  -KL     Therapy Frequency (OT) daily  -       Row Name 04/29/24 1126          Therapy Plan Review/Discharge Plan (OT)    Anticipated Discharge Disposition (OT) inpatient rehabilitation facility  -       Row Name 04/29/24 1126          Vital Signs    Pre Systolic BP Rehab 90  -KL     Pre Treatment Diastolic BP 62  -KL     Intra Systolic BP Rehab 76  -KL     Intra Treatment Diastolic BP 51  -KL     Post Systolic BP Rehab 91  -KL     Post Treatment Diastolic BP 51  -KL     Pretreatment Heart Rate (beats/min) 60  -KL     Posttreatment Heart Rate (beats/min) 73  -KL     Pre SpO2 (%) 98  -KL     O2 Delivery Pre Treatment nasal cannula  -     Post SpO2 (%) 98  -KL     O2 Delivery Post Treatment nasal cannula  -     Pre Patient Position Supine  -     Intra Patient Position Sitting  -     Post Patient Position Supine  -       Row Name 04/29/24 1126          Positioning and Restraints    Pre-Treatment Position in bed  -KL     Post Treatment Position bed  -KL     In Bed notified nsg;supine;call light within reach;encouraged to call for assist;exit alarm on;patient within staff view;with family/caregiver;legs elevated;waffle boots/both;SCD pump applied  -               User Key  (r) = Recorded By, (t) = Taken By, (c) = Cosigned By      Initials Name Provider Type    Naye Davila OT Occupational Therapist                   Outcome Measures       Row Name 04/29/24 1130          How much help from another is currently needed...    Putting on and taking off regular lower body clothing? 1  -KL     Bathing (including washing, rinsing, and drying) 2  -KL     Toileting  (which includes using toilet bed pan or urinal) 1  -KL     Putting on and taking off regular upper body clothing 2  -KL     Taking care of personal grooming (such as brushing teeth) 2  -KL     Eating meals 3  -KL     AM-PAC 6 Clicks Score (OT) 11  -       Row Name 04/29/24 0800          How much help from another person do you currently need...    Turning from your back to your side while in flat bed without using bedrails? 3  -EW     Moving from lying on back to sitting on the side of a flat bed without bedrails? 2  -EW     Moving to and from a bed to a chair (including a wheelchair)? 1  -EW     Standing up from a chair using your arms (e.g., wheelchair, bedside chair)? 1  -EW     Climbing 3-5 steps with a railing? 1  -EW     To walk in hospital room? 1  -EW     AM-PAC 6 Clicks Score (PT) 9  -EW     Highest Level of Mobility Goal 3 --> Sit at edge of bed  -EW       Row Name 04/29/24 1130          Modified [place] Scale    Pre-Stroke Modified [place] Scale 6 - Unable to determine (UTD) from the medical record documentation  -     Modified [place] Scale 4 - Moderately severe disability.  Unable to walk without assistance, and unable to attend to own bodily needs without assistance.  -       Row Name 04/29/24 1130          Functional Assessment    Outcome Measure Options AM-PAC 6 Clicks Daily Activity (OT)  -               User Key  (r) = Recorded By, (t) = Taken By, (c) = Cosigned By      Initials Name Provider Type    Gia Pollock RN Registered Nurse    Naye Davila OT Occupational Therapist                    Occupational Therapy Education       Title: PT OT SLP Therapies (In Progress)       Topic: Occupational Therapy (In Progress)       Point: ADL training (Done)       Description:   Instruct learner(s) on proper safety adaptation and remediation techniques during self care or transfers.   Instruct in proper use of assistive devices.                  Learning Progress Summary             Patient  Acceptance, E, VU by MR at 4/25/2024 1314    Acceptance, E, VU by AN at 4/23/2024 1344                         Point: Home exercise program (Done)       Description:   Instruct learner(s) on appropriate technique for monitoring, assisting and/or progressing therapeutic exercises/activities.                  Learning Progress Summary             Patient Acceptance, E, VU by MR at 4/25/2024 1314    Acceptance, E, VU by AN at 4/23/2024 1344                         Point: Precautions (In Progress)       Description:   Instruct learner(s) on prescribed precautions during self-care and functional transfers.                  Learning Progress Summary             Patient Acceptance, E, NR by KL at 4/29/2024 1131    Acceptance, E, VU by MR at 4/25/2024 1314    Acceptance, E, VU by AN at 4/23/2024 1344   Significant Other Acceptance, E, NR by KL at 4/29/2024 1131                         Point: Body mechanics (In Progress)       Description:   Instruct learner(s) on proper positioning and spine alignment during self-care, functional mobility activities and/or exercises.                  Learning Progress Summary             Patient Acceptance, E, NR by  at 4/29/2024 1131    Acceptance, E, VU by MR at 4/25/2024 1314    Acceptance, E, VU by AN at 4/23/2024 1344   Significant Other Acceptance, E, NR by  at 4/29/2024 1131                                         User Key       Initials Effective Dates Name Provider Type Discipline     09/21/21 -  Gin Jaeger OT Occupational Therapist OT    MR 09/22/22 -  Rosalinda Silva OT Occupational Therapist OT     02/05/24 -  Naye Salinas OT Occupational Therapist OT                  OT Recommendation and Plan  Planned Therapy Interventions (OT): activity tolerance training, adaptive equipment training, functional balance retraining, occupation/activity based interventions, patient/caregiver education/training, ROM/therapeutic exercise, transfer/mobility retraining,  strengthening exercise  Therapy Frequency (OT): daily  Plan of Care Review  Plan of Care Reviewed With: patient, spouse  Progress: no change  Outcome Evaluation: Pt presents to OT re-eval post cardiac arrest. Pt presents w/ generalized weakness, pain, poor balance and mobility. Will adjust goals to meet current abilities. d/c rec is IPR.     Time Calculation:         Time Calculation- OT       Row Name 04/29/24 1132             Time Calculation- OT    OT Start Time 0855  -KL      OT Received On 04/29/24  -      OT Goal Re-Cert Due Date 05/09/24  -         Timed Charges    46938 - OT Therapeutic Activity Minutes 15  -KL         Untimed Charges    OT Eval/Re-eval Minutes 23  -KL         Total Minutes    Timed Charges Total Minutes 15  -KL      Untimed Charges Total Minutes 23  -KL       Total Minutes 38  -KL                User Key  (r) = Recorded By, (t) = Taken By, (c) = Cosigned By      Initials Name Provider Type    Naye Davila OT Occupational Therapist                  Therapy Charges for Today       Code Description Service Date Service Provider Modifiers Qty    01101467537  OT THERAPEUTIC ACT EA 15 MIN 4/29/2024 Naye Salinas OT GO 1    [id number]  OT RE-EVAL 2 4/29/2024 Naye Salinas OT GO 1                 Naye Salinas OT  4/29/2024

## 2024-04-29 NOTE — PLAN OF CARE
Goal Outcome Evaluation:      - Levo 0.02-0.04. Currently infusing @ 0.03 mcg/kg/min  Minimal UOP. 185 mL. Provider notified. Albumin administered.  Intermittently disoriented to situation and time. C/o visual hallucinations intermittently. States he is seeing people in his room that are not there. Denies auditory hallucinations. Able to reorient. Provider aware.  Amio gtt off  PRN norco administered x2 for rib pain.   Able to sit on edge of bed w/ PT.   RA.  Family updated at bedside.

## 2024-04-29 NOTE — PLAN OF CARE
Goal Outcome Evaluation:  Plan of Care Reviewed With: patient, spouse        Progress: no change  Outcome Evaluation: Pt presents to OT re-eval post cardiac arrest. Pt presents w/ generalized weakness, pain, poor balance and mobility. Will adjust goals to meet current abilities. d/c rec is IPR.      Anticipated Discharge Disposition (OT): inpatient rehabilitation facility

## 2024-04-29 NOTE — PROGRESS NOTES
Intensive Care Follow-up     Hospital:  LOS: 6 days   Mr. Jermaine Singh, 79 y.o. male is followed for:   Cardiac arrest            History of present illness:    78yo M with a history of T2DM, Stage 3 CKD, bilateral DVTs, paroxysmal Afib, SSS s/p post-pacemaker placement, CHF, PVD, and left diabetic foot infection who presented to [facility] on 4/22/24 with weakness and a chronic wound on his RLE. UA was consistent with a UTI. He was started on Rocephin and admitted to Hospital Medicine.      On 4/25/24, he suffered a Vfib arrest and was intubated during CPR. He was transferred to the ICU. He was able to be extubated on 4/26/24. After extubation, his respiratory status was tenuous and family decided to make him a DNR/DNI.  Patient was seen by infectious disease and on antibiotics.  Also has been requiring norepinephrine at low-dose.  Midodrine has been started.  Patient also developed shock liver which seems to be recovering.    Subjective   Interval History:  Overnight no acute issues.  Patient wakes up easily.  According to wife patient's mentation is better.  He is starting to eat better.  Denies any other ongoing acute issues.                 The patient's past medical, surgical and social history were reviewed and updated in Epic as appropriate.       Objective     Infusions:  norepinephrine, 0.02-0.3 mcg/kg/min, Last Rate: 0.02 mcg/kg/min (04/29/24 1154)      Medications:  acetylcysteine, 3 mL, Nebulization, BID - RT  [Held by provider] apixaban, 5 mg, Oral, Q12H  aspirin, 81 mg, Oral, Daily  atorvastatin, 20 mg, Oral, Nightly  azelastine, 2 spray, Each Nare, BID  [Held by provider] carvedilol, 3.125 mg, Oral, BID With Meals  heparin (porcine), 5,000 Units, Subcutaneous, Q8H  insulin glargine, 8 Units, Subcutaneous, Nightly  Insulin Lispro, 2-9 Units, Subcutaneous, 4x Daily AC & at Bedtime  lactobacillus acidophilus, 1 capsule, Oral, Daily  levothyroxine, 88 mcg, Oral, Q AM  midodrine, 10 mg, Oral, TID  "AC  pantoprazole, 40 mg, Intravenous, Q AM  piperacillin-tazobactam, 3.375 g, Intravenous, Q8H  polyethylene glycol, 17 g, Oral, Daily  sodium chloride, 10 mL, Intravenous, Q12H  tamsulosin, 0.4 mg, Oral, Daily        Vital Sign Min/Max for last 24 hours  Temp  Min: 97.2 °F (36.2 °C)  Max: 98.1 °F (36.7 °C)   BP  Min: 71/52  Max: 167/128   Pulse  Min: 59  Max: 71   Resp  Min: 14  Max: 21   SpO2  Min: 95 %  Max: 100 %   Flow (L/min)  Min: 1  Max: 1.5       Input/Output for last 24 hour shift  04/28 0701 - 04/29 0700  In: 1743.5 [P.O.:1200; I.V.:343.5]  Out: 695 [Urine:695]      Objective:  Vital signs: (most recent): Blood pressure 101/65, pulse 60, temperature 97.6 °F (36.4 °C), temperature source Axillary, resp. rate 18, height 190 cm (74.8\"), weight 87.7 kg (193 lb 5.5 oz), SpO2 100%.            General Appearance: Awake, alert, in no acute distress  Lungs:   B/L Breath sounds present with decreased breath sounds on bases, no wheezing heard, no crackles.   Heart: S1 and S2 present, no murmur  Abdomen: Soft, nontender, no guarding or rigidity, bowel sounds positive.  Extremities:   no edema, warm to touch.  Right lower extremity dressing intact  Neurologic:  Moving all four extremities. Good strength bilaterally.      Results from last 7 days   Lab Units 04/29/24  0455 04/28/24  0405 04/27/24  0607   WBC 10*3/mm3 9.28 10.11 9.89   HEMOGLOBIN g/dL 7.9* 8.0* 7.8*   PLATELETS 10*3/mm3 205 183 150     Results from last 7 days   Lab Units 04/29/24  0455 04/28/24  0405 04/27/24  0607 04/26/24  0410 04/25/24  1441   SODIUM mmol/L 142 141 142 137 140   POTASSIUM mmol/L 4.0 4.5 4.7 4.8 4.5   CO2 mmol/L 19.0* 17.0* 18.0* 16.0* 13.0*   BUN mg/dL 87* 84* 82* 78* 77*   CREATININE mg/dL 2.52* 2.65* 2.37* 2.28* 2.07*   MAGNESIUM mg/dL  --   --  2.9* 2.9* 4.6*   GLUCOSE mg/dL 170* 157* 142* 188* 293*     Estimated Creatinine Clearance: 29.5 mL/min (A) (by C-G formula based on SCr of 2.52 mg/dL (H)).    Results from last 7 days "   Lab Units 04/26/24  0313   PH, ARTERIAL pH units 7.441   PCO2, ARTERIAL mm Hg 24.6*   PO2 ART mm Hg 118.0*       Images:   None new    I reviewed the patient's results and images.     Assessment & Plan   Impression        Cardiac arrest    Type 2 diabetes mellitus with hyperglycemia    Hyperlipidemia LDL goal <70    Hypothyroidism (acquired)    Coronary artery disease involving native coronary artery of native heart with angina pectoris    Pacemaker    HFrEF (heart failure with reduced ejection fraction)    Anemia, chronic disease    Stage 3b chronic kidney disease    UTI (urinary tract infection)    Acute respiratory failure with hypoxia    Elevated troponin level not due myocardial infarction       Plan        1.  Patient s/p cardiac arrest complicated by shock liver and acute on chronic renal failure.  Patient seems to be recovering.  Has severe cardiomyopathy with ejection fraction less than 20%.  Cardiology team is following.  Continue aspirin, statin.  2.  Hemodynamically unstable still requiring norepinephrine.  Possible stunned myocardium postcardiac arrest versus severe cardiomyopathy.  Continue monitoring closely.  Started on high-dose midodrine and.  Will continue the same.  Will check cortisol level to make sure we are not dealing with adrenal insufficiency.  Will need further goal-directed management of his heart failure once hemodynamically more stable.  3.  Continue antibiotics per ID team.  4.  Monitor oral intake.  5.  Continue current insulin regimen for hyperglycemia management.  Will make further adjustments once oral intake improves.  6.  Eliquis has been on hold due to ongoing renal and liver dysfunction.  Will add subcu heparin for DVT prophylaxis as patient will be high risk for DVT.  7.  GI prophylaxis.  8.  Out of bed and mobilize.  9.  Respiratory status seems to be stabilizing.  Continue Mucomyst neb for another 24 hours and we will evaluate again in the morning.    Continue close  monitoring in ICU as remains at risk of decline.    Plan of care and goals reviewed with multidisciplinary/antibiotic stewardship team during rounds.   I discussed the patient's findings and my recommendations with patient, family, and nursing staff     Time spent Critical care 32 min (exclusive of procedure time)  including high complexity decision making to assess, manipulate, and support vital organ system failure in this individual who has impairment of one or more vital organ systems such that there is a high probability of imminent or life threatening deterioration in the patient’s condition.      Antonio Angeles MD, [facility]  Pulmonary, Critical care and Sleep Medicine

## 2024-04-29 NOTE — PROGRESS NOTES
Clinical Nutrition     Nutrition Support Assessment  Reason for Visit: MDR, Identified at risk by screening criteria, LOS, Difficulty chewing/swallowing      Patient Name: Jermaine Singh  YOB: 1945  MRN: [id number]  Date of Encounter: 04/29/24 15:31 EDT  Admission date: 4/22/2024      Nutrition Assessment   Admission Diagnosis:  UTI (urinary tract infection) [N39.0]      Problem List:    Cardiac arrest    Type 2 diabetes mellitus with hyperglycemia    Hyperlipidemia LDL goal <70    Hypothyroidism (acquired)    Coronary artery disease involving native coronary artery of native heart with angina pectoris    Pacemaker    HFrEF (heart failure with reduced ejection fraction)    Anemia, chronic disease    Stage 3b chronic kidney disease    UTI (urinary tract infection)    Acute respiratory failure with hypoxia    Elevated troponin level not due myocardial infarction        PMH:   He  has a past medical history of Allergic, Arthritis, Asthma, Coronary artery disease, Diabetes mellitus (2000), Diabetic foot infection (07/23/2023), Disease of thyroid gland, Elevated serum creatinine (11/15/2022), Elevated troponin (10/02/2022), Generalized weakness (11/15/2022), History of fracture as a child, Hyperlipidemia, Hypertension, Hypothyroidism, PAF (paroxysmal atrial fibrillation) (07/23/2023), Peripheral neuropathy, Peripheral vascular disease, Pleural effusion, bilateral (11/15/2022), Proximal phalanx fracture of the second digit extending into the second metatarsal joint (07/28/2022), RBBB, Right knee pain, Status post amputation of great toe and second toe of left foot (07/23/2023), Status post amputation of great toe, left (11/15/2022), Unstable angina (02/12/2019), and Vitamin D deficiency.    PSH:  He  has a past surgical history that includes Eye surgery (Bilateral); Leg Surgery; Tonsillectomy; Appendectomy; Colonoscopy; Laceration Repair; Cardiac catheterization (N/A, 02/14/2019); Knee  arthroscopy; Coronary artery bypass graft (N/A, 03/22/2019); Interventional radiology procedure (N/A, 07/29/2021); Cardiac electrophysiology procedure (N/A, 05/18/2022); Foot amputation through metatarsal (Left, 08/02/2022); Finger amputation (Left, 01/17/2023); Fracture surgery; and Cardiac surgery.      Applicable Nutrition Concerns:   Skin:L 3rd toe ulcer, R foot blisters, RLE wound,  B gluteal MASD  GI:last bmi  4-25      Applicable Interval History:     V-fib arrest 4-25-24  ARF/VENT 4-25-24  Extubated 4-26-24    SLP Recommendation and Plan  SLP Swallowing Diagnosis: moderate, pharyngeal dysphagia (04/28/24 1330)  SLP Diet Recommendation: regular textures, no mixed consistencies, nectar thick liquids (no straws) (04/28/24 1330)  Recommended Precautions and Strategies: upright posture during/after eating, small bites of food and sips of liquid, no straw, multiple swallows per bite of food, multiple swallows per sip of liquid, general aspiration precautions, assist with feeding (04/28/24 1330)  SLP Rec. for Method of Medication Administration: meds whole, meds crushed, with puree, as tolerated (04/28/24 1330)    Reported/Observed/Food/Nutrition Related History:     Pt resting in bed, reports fair appetite, states UBW is 170lb    Labs    Labs Reviewed: Yes     Results from last 7 days   Lab Units 04/29/24  0455 04/28/24  0405 04/27/24  0607 04/26/24  0410 04/25/24  1441   GLUCOSE mg/dL 170* 157* 142* 188* 293*   BUN mg/dL 87* 84* 82* 78* 77*   CREATININE mg/dL 2.52* 2.65* 2.37* 2.28* 2.07*   SODIUM mmol/L 142 141 142 137 140   CHLORIDE mmol/L 112* 108* 110* 106 106   POTASSIUM mmol/L 4.0 4.5 4.7 4.8 4.5   MAGNESIUM mg/dL  --   --  2.9* 2.9* 4.6*   ALT (SGPT) U/L 320* 450* 626* 1,028* 454*       Results from last 7 days   Lab Units 04/29/24  0455 04/28/24  0405 04/27/24  0607 04/26/24  0410 04/25/24  1441   ALBUMIN g/dL 2.8* 2.9* 3.0* 3.1* 3.3*   IONIZED CALCIUM mmol/L  --   --   --  1.21 1.24       Results from  "last 7 days   Lab Units 04/29/24  1134 04/29/24  0752 04/29/24  0506 04/29/24  0002 04/28/24 2005 04/28/24  1807   GLUCOSE mg/dL 151* 149* 176* 235* 249* 189*     Lab Results   Lab Value Date/Time    HGBA1C 8.50 (H) 04/23/2024 0550    HGBA1C 8.70 (H) 07/23/2023 2235                 Medications    Medications Reviewed: Yes  Pertinent: abx, insulin, probiotic, protonix  Infusion:levophed 0.02mcg    Intake/Ouptut 24 hrs (0701 - 0700)   I&O's Reviewed: Yes     Intake & Output (last day)         04/28 0701 04/29 0700 04/29 0701 04/30 0700    P.O. 1200 1200    I.V. (mL/kg) 343.5 (3.9) 202 (2.3)    Other  10    IV Piggyback 200     Total Intake(mL/kg) 1743.5 (19.9) 1412 (16.1)    Urine (mL/kg/hr) 695 (0.3) 140 (0.2)    Total Output 695 140    Net +1048.5 +1272                    Anthropometrics     Flowsheet Rows      Flowsheet Row First Filed Value   Admission Height 190.5 cm (75\") Documented at 04/22/2024 1438   Admission Weight 77.1 kg (170 lb) Documented at 04/22/2024 1438          Height: Height: 190 cm (74.8\")  Last Filed Weight: Weight: 87.7 kg (193 lb 5.5 oz) (04/29/24 0600)  Method: Weight Method: Bed scale  BMI: BMI (Calculated): 24.3  BMI classification: Normal: 18.5-24.9kg/m2    UBW: 170lb    Weight change: 22lb wt gain since admission per EMR, ? Inaccurate bedscale wt     Weight       Weight (kg) Weight (lbs) Weight Method Visit Report   1/17/2023 90.447 kg  199 lb 6.4 oz       90.447 kg  199 lb 6.4 oz      1/18/2023 97.569 kg  215 lb 1.6 oz  Bed scale      91.899 kg  202 lb 9.6 oz  Standing scale     1/19/2023 93.895 kg  207 lb  Bed scale     1/20/2023 92.987 kg  205 lb  Bed scale     1/21/2023 92.761 kg  204 lb 8 oz  Bed scale     1/22/2023 91.309 kg  201 lb 4.8 oz  Bed scale     1/25/2023 91.173 kg  201 lb   --    2/15/2023 89.812 kg  198 lb   --    3/8/2023 88.905 kg  196 lb   --    3/14/2023 88.905 kg  196 lb   --    3/22/2023 88.451 kg  195 lb   --    5/16/2023 84.823 kg  187 lb   --    5/24/2023 " 84.823 kg  187 lb   --    7/23/2023 80.287 kg  177 lb  Stated      81.285 kg  179 lb 3.2 oz      8/4/2023 80.287 kg  177 lb   --    8/23/2023 83.915 kg  185 lb   --    10/18/2023 76.658 kg  169 lb      10/19/2023 76.658 kg  169 lb   --    3/5/2024 77.111 kg  170 lb   --    3/9/2024 77.1 kg  169 lb 15.6 oz  Estimated     4/22/2024 77.111 kg  170 lb  Stated     4/23/2024 77.701 kg  171 lb 4.8 oz  Bed scale     4/25/2024 77 kg  169 lb 12.1 oz      4/26/2024 81.5 kg  179 lb 10.8 oz  Bed scale     4/27/2024 81.2 kg  179 lb 0.2 oz  Bed scale     4/28/2024 84.4 kg  186 lb 1.1 oz  Bed scale     4/29/2024 87.7 kg  193 lb 5.5 oz            Nutrition Focused Physical Exam     Date:     Unable to perform exam due to: Defer pending indication      Current Nutrition Prescription     PO: Diet: Cardiac, Diabetic; Healthy Heart (2-3 Na+); Consistent Carbohydrate; No Mixed Consistencies, No Straw, Feeding Assistance - Nursing; Texture: Regular (IDDSI 7); Fluid Consistency: [place] Thick  Oral Nutrition Supplement:   Intake:  25% of 3 meals      Nutrition Diagnosis   Date: 4-29-24 Updated:   Problem Inadequate oral intake   Etiology Per Clinical Status   Signs/Symptoms Po intake 25%       Goal:   General: Nutrition to support treatment  PO: Increase intake  EN/PN: N/A    Nutrition Intervention      Follow treatment progress, Advise alternate selection, Interview for preferences, Menu adjusted, Supplement provided    Add Magic Cups 3x/day    Monitoring/Evaluation:   Per protocol, I&O, PO intake, Pertinent labs, Weight, Skin status, GI status, Symptoms, Swallow function, Hemodynamic stability      Rosalinda Edge RD  Time Spent: 45min

## 2024-04-29 NOTE — PLAN OF CARE
Goal Outcome Evaluation:  Plan of Care Reviewed With: patient, spouse        Progress: no change  Outcome Evaluation: PT re-eval completed. Pt presents below baseline function d/t acute pain, generalized weakness, decreased balance, and decreased activity tolerance. Pt required maxA x2 for sup>sit and sat EOB w/ Tristan x2. Further mobility deferred orthostatic hypotension and increased pain. Pt would benefit from skilled IP PT. Rec IRF at d/c.      Anticipated Discharge Disposition (PT): inpatient rehabilitation facility

## 2024-04-29 NOTE — THERAPY RE-EVALUATION
Patient Name: Jermaine Singh  : 1945    MRN: [id number]                              Today's Date: 2024       Admit Date: 2024    Visit Dx:     ICD-10-CM ICD-9-CM   1. Acute UTI  N39.0 599.0   2. Generalized weakness  R53.1 780.79   3. Edema of right lower extremity  R60.0 782.3   4. Leg erythema  L53.9 695.9   5. Chronic kidney disease, unspecified CKD stage  N18.9 585.9   6. Elevated troponin  R79.89 790.6   7. Hyperkalemia  E87.5 276.7   8. Cardiac arrest  I46.9 427.5   9. Oropharyngeal dysphagia  R13.12 787.22     Patient Active Problem List   Diagnosis    Type 2 diabetes mellitus with hyperglycemia    Hyperlipidemia LDL goal <70    Hypothyroidism (acquired)    Vitamin D deficiency on Rx     Diabetic neuropathy    Coronary artery disease involving native coronary artery of native heart with angina pectoris    Atherosclerosis of native artery of both lower extremities with intermittent claudication    Pacemaker    HFrEF (heart failure with reduced ejection fraction)    DVT, bilateral lower limbs    Cellulitis of right lower extremity    Anemia, chronic disease    PVD (peripheral vascular disease)    Diabetic foot ulcer    GERD without esophagitis    Stage 3b chronic kidney disease    Right inguinal hernia    PAF (paroxysmal atrial fibrillation)    Moderate malnutrition    UTI (urinary tract infection)    Cardiac arrest    Acute respiratory failure with hypoxia    History of DVT (deep vein thrombosis)    Elevated troponin level not due myocardial infarction     Past Medical History:   Diagnosis Date    Allergic     Arthritis     Asthma     Coronary artery disease     Diabetes mellitus 2000    started on inuslin 2018; started on po meds in ; checking blood sugars daily     Diabetic foot infection 2023    Disease of thyroid gland     po meds daily for hypothyroidism     Elevated serum creatinine 11/15/2022    Elevated troponin 10/02/2022    Generalized weakness 11/15/2022     History of fracture as a child     rt leg- severe     Hyperlipidemia     Hypertension     Hypothyroidism     PAF (paroxysmal atrial fibrillation) 07/23/2023    Peripheral neuropathy     Peripheral vascular disease     s/p angiogram 2/19-needs stent in left leg     Pleural effusion, bilateral 11/15/2022    Proximal phalanx fracture of the second digit extending into the second metatarsal joint 07/28/2022    RBBB     Right knee pain     Status post amputation of great toe and second toe of left foot 07/23/2023    Status post amputation of great toe, left 11/15/2022    Unstable angina 02/12/2019    Added automatically from request for surgery [id number]    Vitamin D deficiency      Past Surgical History:   Procedure Laterality Date    AMPUTATION DIGIT Left 01/17/2023    Procedure: AMPUTATION DIGIT LEFT;  Surgeon: Gopal Márquez MD;  Location:  [facility];  Service: Vascular;  Laterality: Left;    APPENDECTOMY      CARDIAC CATHETERIZATION N/A 02/14/2019    Procedure: Left Heart Cath;  Surgeon: Cooper Apodaca MD;  Location: [facility];  Service: Cardiology    CARDIAC ELECTROPHYSIOLOGY PROCEDURE N/A 05/18/2022    Procedure: DEVICE IMPLANT;  Surgeon: Cooper Apodaca MD;  Location: [facility] LOCATION;  Service: Cardiology;  Laterality: N/A;    CARDIAC SURGERY      COLONOSCOPY      CORONARY ARTERY BYPASS GRAFT N/A 03/22/2019    Procedure: MEDIAN STERNOTOMY, CORONARY ARTERY BYPASS GRAFT X3, UTILIZING THE LEFT INTERNAL MAMMARY ARTERY, EVH AND OPEN HARVEST OF THE RIGHT GREATER SAPHENOUS VEIN, EXPLORATION OF THE LEFT LEG;  Surgeon: Ap Au MD;  Location: [facility];  Service: Cardiothoracic    EYE SURGERY Bilateral     cataracts     FRACTURE SURGERY      INTERVENTIONAL RADIOLOGY PROCEDURE N/A 07/29/2021    Procedure: Abdominal Aortagram with Runoff;  Surgeon: Jermaine Hernandez MD;  Location: [facility]ATH INVASIVE LOCATION;  Service: Cardiovascular;  Laterality: N/A;    KNEE  ARTHROSCOPY      right x 2, left x 1    LACERATION REPAIR      right leg    LEG SURGERY      2 for fracture of rt leg     TONSILLECTOMY      Adnoidectomy    TRANS METATARSAL AMPUTATION Left 08/02/2022    Procedure: GREAT TOE AMPUTATION LEFT;  Surgeon: Gopal Márquez MD;  Location: [facility];  Service: Vascular;  Laterality: Left;      General Information       Row Name 04/29/24 1102          Physical Therapy Time and Intention    Document Type re-evaluation  -     Mode of Treatment physical therapy  -       Row Name 04/29/24 1102          General Information    Patient Profile Reviewed yes  -     Prior Level of Function independent:;all household mobility;community mobility;gait;transfer;max assist:;ADL's  Per SO, pt using FWW for short distances; sleeps in lift chair; maxA for ADLs  -     Existing Precautions/Restrictions fall;cardiac;oxygen therapy device and L/min;orthostatic hypotension;other (see comments)  RLE wounds  -     Barriers to Rehab medically complex;previous functional deficit  -       Row Name 04/29/24 1102          Living Environment    People in Home spouse  -       Row Name 04/29/24 1102          Home Main Entrance    Number of Stairs, Main Entrance three  -     Stair Railings, Main Entrance railing on right side (ascending)  -       Row Name 04/29/24 1102          Stairs Within Home, Primary    Number of Stairs, Within Home, Primary none  -       Row Name 04/29/24 1102          Cognition    Orientation Status (Cognition) oriented x 3;disoriented to;situation;other (see comments)  required choices for name of hospital  -       Row Name 04/29/24 1102          Safety Issues, Functional Mobility    Safety Issues Affecting Function (Mobility) awareness of need for assistance;insight into deficits/self-awareness;safety precaution awareness;safety precautions follow-through/compliance;sequencing abilities;judgment  -     Impairments Affecting Function (Mobility)  balance;endurance/activity tolerance;strength;postural/trunk control;pain;motor control;motor planning;shortness of breath  -               User Key  (r) = Recorded By, (t) = Taken By, (c) = Cosigned By      Initials Name Provider Type     Bessie Servin, PT Physical Therapist                   Mobility       Row Name 04/29/24 1103          Bed Mobility    Bed Mobility supine-sit;sit-supine  -     Supine-Sit Dane (Bed Mobility) maximum assist (25% patient effort);2 person assist;verbal cues;nonverbal cues (demo/gesture)  -     Sit-Supine Dane (Bed Mobility) dependent (less than 25% patient effort);2 person assist;supervision;verbal cues  -     Assistive Device (Bed Mobility) head of bed elevated;draw sheet;bed rails  -     Comment, (Bed Mobility) Required significant assist at BLEs and trunk. Edcuated pt on using pillow for splinting to decrease pain. Dizziness reported in sitting w/ BP 76/51. RN notified  -       Row Name 04/29/24 1103          Transfers    Comment, (Transfers) Deferred further mobility d/t symtpomatic orthostatic hypotension  -       Row Name 04/29/24 1103          Bed-Chair Transfer    Bed-Chair Dane (Transfers) unable to assess  -[facility] Name 04/29/24 1103          Sit-Stand Transfer    Sit-Stand Dane (Transfers) unable to assess  -[facility] Name 04/29/24 1103          Gait/Stairs (Locomotion)    Dane Level (Gait) unable to assess  -     Comment, (Gait/Stairs) Deferred d/t symptomatic orthostatic hypotension  -               User Key  (r) = Recorded By, (t) = Taken By, (c) = Cosigned By      Initials Name Provider Type     Bessie Servin PT Physical Therapist                   Obj/Interventions       Row Name 04/29/24 1149          Range of Motion Comprehensive    General Range of Motion lower extremity range of motion deficits identified  -     Comment, General Range of Motion R LE decreased d/t wrapping  -[facility]  Name 04/29/24 1149          Strength Comprehensive (MMT)    General Manual Muscle Testing (MMT) Assessment lower extremity strength deficits identified  -     Comment, General Manual Muscle Testing (MMT) Assessment Observed, B LEs grossly 3-/5  -       Row Name 04/29/24 1149          Balance    Balance Assessment sitting static balance;sitting dynamic balance  -     Static Sitting Balance minimal assist;2-person assist;verbal cues  -     Dynamic Sitting Balance moderate assist;2-person assist;verbal cues  -     Position, Sitting Balance supported;sitting edge of bed  -     Position/Device Used, Standing Balance supported;walker, front-wheeled  -     Balance Interventions sitting;supported;static;dynamic  -     Comment, Balance Strong posterior lean w/ minimal corrections noted w/ cues  -       Row Name 04/29/24 1149          Sensory Assessment (Somatosensory)    Sensory Assessment (Somatosensory) LE sensation intact  -               User Key  (r) = Recorded By, (t) = Taken By, (c) = Cosigned By      Initials Name Provider Type     Bessie Servin, PT Physical Therapist                   Goals/Plan       Row Name 04/29/24 1157          Bed Mobility Goal 1 (PT)    Activity/Assistive Device (Bed Mobility Goal 1, PT) bed mobility activities, all  -     Gregory Level/Cues Needed (Bed Mobility Goal 1, PT) moderate assist (50-74% patient effort)  -     Time Frame (Bed Mobility Goal 1, PT) long term goal (LTG);2 weeks  -     Progress/Outcomes (Bed Mobility Goal 1, PT) goal revised this date  -       Row Name 04/29/24 1157          Transfer Goal 1 (PT)    Activity/Assistive Device (Transfer Goal 1, PT) sit-to-stand/stand-to-sit;bed-to-chair/chair-to-bed;walker, rolling  -     Gregory Level/Cues Needed (Transfer Goal 1, PT) moderate assist (50-74% patient effort)  -     Time Frame (Transfer Goal 1, PT) long term goal (LTG);2 weeks  -     Progress/Outcome (Transfer Goal 1, PT) goal  revised this date  -[facility] Name 04/29/24 1157          Gait Training Goal 1 (PT)    Activity/Assistive Device (Gait Training Goal 1, PT) gait (walking locomotion);improve balance and speed;increase endurance/gait distance;assistive device use;walker, rolling  -     [place] Level (Gait Training Goal 1, PT) moderate assist (50-74% patient effort)  -     Distance (Gait Training Goal 1, PT) 10 ft  -     Time Frame (Gait Training Goal 1, PT) long term goal (LTG);2 weeks  -[facility] Name 04/29/24 1157          Stairs Goal 1 (PT)    Activity/Assistive Device (Stairs Goal 1, PT) stairs, all skills;using handrail, left;using handrail, right  -     [place] Level/Cues Needed (Stairs Goal 1, PT) contact guard required  -     Number of Stairs (Stairs Goal 1, PT) 4  -     Time Frame (Stairs Goal 1, PT) long term goal (LTG);2 weeks  -     Progress/Outcome (Stairs Goal 1, PT) medical status inhibiting progress;goal no longer appropriate  -[facility] Name 04/29/24 1157          Therapy Assessment/Plan (PT)    Planned Therapy Interventions (PT) balance training;bed mobility training;gait training;home exercise program;neuromuscular re-education;patient/family education;postural re-education;ROM (range of motion);strengthening;stretching;stair training;transfer training  -               User Key  (r) = Recorded By, (t) = Taken By, (c) = Cosigned By      Initials Name Provider Type     Bessie Servin, PT Physical Therapist                   Clinical Impression       [facility] Name 04/29/24 1152          Pain    Additional Documentation Pain Scale: FACES Pre/Post-Treatment (Group)  -[facility] Name 04/29/24 1152          Pain Scale: FACES Pre/Post-Treatment    Pain: FACES Scale, Pretreatment 6-->hurts even more  -     Posttreatment Pain Rating 6-->hurts even more  -     Pain Location - chest  -     Pre/Posttreatment Pain Comment chest pain from chest compressions  -[facility] Name 04/29/24  1152          Plan of Care Review    Plan of Care Reviewed With patient;spouse  -     Progress no change  -     Outcome Evaluation PT re-eval completed. Pt presents below baseline function d/t acute pain, generalized weakness, decreased balance, and decreased activity tolerance. Pt required maxA x2 for sup>sit and sat EOB w/ Tristan x2. Further mobility deferred orthostatic hypotension and increased pain. Pt would benefit from skilled IP PT. Rec IRF at d/c.  -       Row Name 04/29/24 1152          Therapy Assessment/Plan (PT)    Rehab Potential (PT) good, to achieve stated therapy goals  -     Criteria for Skilled Interventions Met (PT) yes;meets criteria;skilled treatment is necessary  -     Therapy Frequency (PT) daily  -       Row Name 04/29/24 1152          Vital Signs    Pre Systolic BP Rehab 87  -     Pre Treatment Diastolic BP 65  -     Intra Systolic BP Rehab 76   RN notified  -     Intra Treatment Diastolic BP 51  -LH     Post Systolic BP Rehab 91  -     Post Treatment Diastolic BP 51  -     Pretreatment Heart Rate (beats/min) 60  -     Posttreatment Heart Rate (beats/min) 71  -LH     Pre SpO2 (%) 99  -LH     O2 Delivery Pre Treatment nasal cannula  -     O2 Delivery Intra Treatment nasal cannula  -     Post SpO2 (%) 98  -LH     O2 Delivery Post Treatment nasal cannula  -     Pre Patient Position Supine  -     Intra Patient Position Sitting  -     Post Patient Position Supine  -[facility] Name 04/29/24 1152          Positioning and Restraints    Pre-Treatment Position in bed  -     Post Treatment Position bed  -     In Bed notified nsg;supine;fowlers;call light within reach;encouraged to call for assist;exit alarm on;with family/caregiver;SCD pump applied;waffle boots/both;heels elevated  -               User Key  (r) = Recorded By, (t) = Taken By, (c) = Cosigned By      Initials Name Provider Type     Bessie Servin, PT Physical Therapist                   Outcome  Measures       Row Name 04/29/24 1159 04/29/24 0800       How much help from another person do you currently need...    Turning from your back to your side while in flat bed without using bedrails? 2  - 3  -EW    Moving from lying on back to sitting on the side of a flat bed without bedrails? 2  - 2  -EW    Moving to and from a bed to a chair (including a wheelchair)? 1  - 1  -EW    Standing up from a chair using your arms (e.g., wheelchair, bedside chair)? 2  - 1  -EW    Climbing 3-5 steps with a railing? 1  - 1  -EW    To walk in hospital room? 1  -LH 1  -EW    AM-PAC 6 Clicks Score (PT) 9  - 9  -EW    Highest Level of Mobility Goal 3 --> Sit at edge of bed  - 3 --> Sit at edge of bed  -EW      Row Name 04/29/24 1159 04/29/24 1130       Modified Keith Scale    Pre-Stroke Modified Keith Scale 6 - Unable to determine (UTD) from the medical record documentation  - 6 - Unable to determine (UTD) from the medical record documentation  -    Modified Keith Scale 4 - Moderately severe disability.  Unable to walk without assistance, and unable to attend to own bodily needs without assistance.  - 4 - Moderately severe disability.  Unable to walk without assistance, and unable to attend to own bodily needs without assistance.  -      Row Name 04/29/24 1159 04/29/24 1130       Functional Assessment    Outcome Measure Options AM-PAC 6 Clicks Basic Mobility (PT)  - AM-PAC 6 Clicks Daily Activity (OT)  -              User Key  (r) = Recorded By, (t) = Taken By, (c) = Cosigned By      Initials Name Provider Type    Gia Pollock, RN Registered Nurse     Bessie Servin, SETH Physical Therapist    aNye Davila OT Occupational Therapist                                 Physical Therapy Education       Title: PT OT SLP Therapies (In Progress)       Topic: Physical Therapy (In Progress)       Point: Mobility training (In Progress)       Learning Progress Summary             Patient Acceptance, E, NR  by  at 4/29/2024 1159    Eager, E, VU,DU,NR by SS at 4/24/2024 1452    Comment: Reviewed safety/technique w/transfers, ambulation, HEP, PT POC    Acceptance, E, VU by KR at 4/23/2024 1120   Family Acceptance, E, NR by  at 4/29/2024 1159    Acceptance, E, VU by KR at 4/23/2024 1120                         Point: Home exercise program (Done)       Learning Progress Summary             Patient Eager, E, VU,DU,NR by SS at 4/24/2024 1452    Comment: Reviewed safety/technique w/transfers, ambulation, HEP, PT POC                         Point: Body mechanics (In Progress)       Learning Progress Summary             Patient Acceptance, E, NR by  at 4/29/2024 1159    Eager, E, VU,DU,NR by  at 4/24/2024 1452    Comment: Reviewed safety/technique w/transfers, ambulation, HEP, PT POC    Acceptance, E, VU by KR at 4/23/2024 1120   Family Acceptance, E, NR by  at 4/29/2024 1159    Acceptance, E, VU by KR at 4/23/2024 1120                         Point: Precautions (In Progress)       Learning Progress Summary             Patient Acceptance, E, NR by  at 4/29/2024 1159    Eager, E, VU,DU,NR by  at 4/24/2024 1452    Comment: Reviewed safety/technique w/transfers, ambulation, HEP, PT POC    Acceptance, E, VU by  at 4/23/2024 1120   Family Acceptance, E, NR by  at 4/29/2024 1159    Acceptance, E, VU by  at 4/23/2024 1120                                         User Key       Initials Effective Dates Name Provider Type Discipline     06/01/21 -  Jenna Stanley, PT Physical Therapist PT     12/30/22 -  Tiffanie Erwin, PT Physical Therapist PT     09/21/23 -  Bessie Servin, PT Physical Therapist PT                  PT Recommendation and Plan  Planned Therapy Interventions (PT): balance training, bed mobility training, gait training, home exercise program, neuromuscular re-education, patient/family education, postural re-education, ROM (range of motion), strengthening, stretching, stair training, transfer  training  Plan of Care Reviewed With: patient, spouse  Progress: no change  Outcome Evaluation: PT re-eval completed. Pt presents below baseline function d/t acute pain, generalized weakness, decreased balance, and decreased activity tolerance. Pt required maxA x2 for sup>sit and sat EOB w/ Tristan x2. Further mobility deferred orthostatic hypotension and increased pain. Pt would benefit from skilled IP PT. Rec IRF at d/c.     Time Calculation:         PT Charges       Row Name 04/29/24 1159 04/29/24 0815          Time Calculation    Start Time 0858  - 0815  -     PT Received On 04/29/24  - --     PT Goal Re-Cert Due Date 05/09/24  - 05/03/24  -        Timed Charges    91103 - PT Therapeutic Activity Minutes 8  - --        Untimed Charges    33067-Npws Boot -- 15  -     00338-Owcdpjwvm debridement -- 15  -MF        SNF Physical Therapy Minutes    Skilled Minutes- PT 27 min  - --        Total Minutes    Timed Charges Total Minutes 8 - --     Untimed Charges Total Minutes -- 30  -      Total Minutes 8 - 30  -               User Key  (r) = Recorded By, (t) = Taken By, (c) = Cosigned By      Initials Name Provider Type     Cooper Ni, PT Physical Therapist     Bessie Servin, PT Physical Therapist                  Therapy Charges for Today       Code Description Service Date Service Provider Modifiers Qty    [id number]  PT THERAPEUTIC ACT EA 15 MIN 4/29/2024 Bessie Servin, PT GP 1    [id number]  PT RE-EVAL ESTABLISHED PLAN 2 4/29/2024 Bessie Servin, PT GP 1            PT G-Codes  Outcome Measure Options: AM-PAC 6 Clicks Basic Mobility (PT)  AM-PAC 6 Clicks Score (PT): 9  AM-PAC 6 Clicks Score (OT): 11  Modified [place] Scale: 4 - Moderately severe disability.  Unable to walk without assistance, and unable to attend to own bodily needs without assistance.  PT Discharge Summary  Anticipated Discharge Disposition (PT): inpatient rehabilitation facility    Bessie Servin  PT  4/29/2024

## 2024-04-29 NOTE — PLAN OF CARE
Goal Outcome Evaluation:  Plan of Care Reviewed With: patient, spouse           Outcome Evaluation: RLE unna boot changed today with good overall reduction in edema and wound size with use of unna boot and LE elevation. PT will f/u in 3-4 days for further debridement as needed.

## 2024-04-29 NOTE — THERAPY WOUND CARE TREATMENT
Acute Care - Wound/Debridement Treatment Note   [place]     Patient Name: Jermaine Singh  : 1945  MRN: [id number]  Today's Date: 2024                Admit Date: 2024    Visit Dx:    ICD-10-CM ICD-9-CM   1. Acute UTI  N39.0 599.0   2. Generalized weakness  R53.1 780.79   3. Edema of right lower extremity  R60.0 782.3   4. Leg erythema  L53.9 695.9   5. Chronic kidney disease, unspecified CKD stage  N18.9 585.9   6. Elevated troponin  R79.89 790.6   7. Hyperkalemia  E87.5 276.7   8. Cardiac arrest  I46.9 427.5   9. Oropharyngeal dysphagia  R13.12 787.22       Patient Active Problem List   Diagnosis    Type 2 diabetes mellitus with hyperglycemia    Hyperlipidemia LDL goal <70    Hypothyroidism (acquired)    Vitamin D deficiency on Rx     Diabetic neuropathy    Coronary artery disease involving native coronary artery of native heart with angina pectoris    Atherosclerosis of native artery of both lower extremities with intermittent claudication    Pacemaker    HFrEF (heart failure with reduced ejection fraction)    DVT, bilateral lower limbs    Cellulitis of right lower extremity    Anemia, chronic disease    PVD (peripheral vascular disease)    Diabetic foot ulcer    GERD without esophagitis    Stage 3b chronic kidney disease    Right inguinal hernia    PAF (paroxysmal atrial fibrillation)    Moderate malnutrition    UTI (urinary tract infection)    Cardiac arrest    Acute respiratory failure with hypoxia    History of DVT (deep vein thrombosis)    Elevated troponin level not due myocardial infarction        Past Medical History:   Diagnosis Date    Allergic     Arthritis     Asthma     Coronary artery disease     Diabetes mellitus 2000    started on inuslin 2018; started on po meds in ; checking blood sugars daily     Diabetic foot infection 2023    Disease of thyroid gland     po meds daily for hypothyroidism     Elevated serum creatinine 11/15/2022    Elevated troponin  10/02/2022    Generalized weakness 11/15/2022    History of fracture as a child     rt leg- severe     Hyperlipidemia     Hypertension     Hypothyroidism     PAF (paroxysmal atrial fibrillation) 07/23/2023    Peripheral neuropathy     Peripheral vascular disease     s/p angiogram 2/19-needs stent in left leg     Pleural effusion, bilateral 11/15/2022    Proximal phalanx fracture of the second digit extending into the second metatarsal joint 07/28/2022    RBBB     Right knee pain     Status post amputation of great toe and second toe of left foot 07/23/2023    Status post amputation of great toe, left 11/15/2022    Unstable angina 02/12/2019    Added automatically from request for surgery [id number]    Vitamin D deficiency         Past Surgical History:   Procedure Laterality Date    AMPUTATION DIGIT Left 01/17/2023    Procedure: AMPUTATION DIGIT LEFT;  Surgeon: Gopal Márquez MD;  Location:  [facility];  Service: Vascular;  Laterality: Left;    APPENDECTOMY      CARDIAC CATHETERIZATION N/A 02/14/2019    Procedure: Left Heart Cath;  Surgeon: Cooper Apodaca MD;  Location:  [facility] INVASIVE LOCATION;  Service: Cardiology    CARDIAC ELECTROPHYSIOLOGY PROCEDURE N/A 05/18/2022    Procedure: DEVICE IMPLANT;  Surgeon: Cooper Apodaca MD;  Location:  [facility] INVASIVE LOCATION;  Service: Cardiology;  Laterality: N/A;    CARDIAC SURGERY      COLONOSCOPY      CORONARY ARTERY BYPASS GRAFT N/A 03/22/2019    Procedure: MEDIAN STERNOTOMY, CORONARY ARTERY BYPASS GRAFT X3, UTILIZING THE LEFT INTERNAL MAMMARY ARTERY, EVH AND OPEN HARVEST OF THE RIGHT GREATER SAPHENOUS VEIN, EXPLORATION OF THE LEFT LEG;  Surgeon: Ap Au MD;  Location:  [facility];  Service: Cardiothoracic    EYE SURGERY Bilateral     cataracts     FRACTURE SURGERY      INTERVENTIONAL RADIOLOGY PROCEDURE N/A 07/29/2021    Procedure: Abdominal Aortagram with Runoff;  Surgeon: Jermaine Hernandez MD;  Location:  [facility] INVASIVE LOCATION;   Service: Cardiovascular;  Laterality: N/A;    KNEE ARTHROSCOPY      right x 2, left x 1    LACERATION REPAIR      right leg    LEG SURGERY      2 for fracture of rt leg     TONSILLECTOMY      Adnoidectomy    TRANS METATARSAL AMPUTATION Left 08/02/2022    Procedure: GREAT TOE AMPUTATION LEFT;  Surgeon: Gopal Márquez MD;  Location: [facility];  Service: Vascular;  Laterality: Left;           Wound 04/22/24 2240 Left anterior third toe (Active)   Dressing Appearance open to air 04/29/24 0800   Closure None 04/29/24 0800   Base pink;red;clean 04/29/24 0800   Periwound Care dry periwound area maintained 04/29/24 0800       Wound 04/22/24 2240 Right posterior plantar Blisters (Active)   Dressing Appearance dry;intact 04/29/24 0815   Closure Unable to assess 04/29/24 0800   Base dry;pink 04/29/24 0815   Periwound intact 04/29/24 0815   Periwound Temperature warm 04/29/24 0815   Periwound Skin Turgor soft 04/29/24 0815   Edges irregular 04/29/24 0815   Wound Length (cm) 2.5 cm 04/29/24 0815   Wound Width (cm) 3 cm 04/29/24 0815   Wound Depth (cm) 0 cm 04/29/24 0815   Wound Surface Area (cm^2) 7.5 cm^2 04/29/24 0815   Wound Volume (cm^3) 0 cm^3 04/29/24 0815   Drainage Amount none 04/29/24 0815   Dressing Care dressing changed 04/29/24 0815   Periwound Care cleansed with pH balanced cleanser 04/29/24 0815       Wound 04/22/24 2240 Right lower leg Other (comment) (Active)   Wound Image   04/29/24 0815   Dressing Appearance dry;intact 04/29/24 0815   Closure Unable to assess 04/29/24 0800   Base moist;pink;red 04/29/24 0815   Periwound intact;dry 04/29/24 0815   Periwound Temperature warm 04/29/24 0815   Periwound Skin Turgor soft 04/29/24 0815   Edges irregular 04/29/24 0815   Wound Length (cm) 9 cm 04/29/24 0815   Wound Width (cm) 2.5 cm 04/29/24 0815   Wound Depth (cm) 0.1 cm 04/29/24 0815   Wound Surface Area (cm^2) 22.5 cm^2 04/29/24 0815   Wound Volume (cm^3) 2.25 cm^3 04/29/24 0815   Drainage Characteristics/Odor  serosanguineous 04/29/24 0815   Drainage Amount small 04/29/24 0815   Care, Wound irrigated with;wound cleanser;debrided 04/29/24 0815   Dressing Care dressing changed 04/29/24 0815   Periwound Care dry periwound area maintained 04/29/24 0815       Wound 04/27/24 2130 Bilateral gluteal MASD (Moisture associated skin damage) (Active)   Dressing Appearance dry;intact 04/29/24 0800   Closure None 04/29/24 0600   Base blanchable;dry 04/29/24 0600   Periwound intact;pink;redness 04/29/24 0800   Periwound Temperature warm 04/29/24 0800   Periwound Skin Turgor soft 04/29/24 0800   Care, Wound cleansed with;soap and water 04/28/24 2000   Dressing Care dressing applied;foam;low-adherent 04/28/24 2000   Periwound Care dry periwound area maintained 04/29/24 0800      Lymphedema       Row Name 04/29/24 0815             Lymphedema Edema Assessment    Ptting Edema Category By severity  -      Pitting Edema Mild  -         Compression/Skin Care    Compression/Skin Care skin care;wrapping location;bandaging  -      Skin Care washed/dried;lotion applied  -      Wrapping Location lower extremity  -      Wrapping Location LE bilateral:;foot to knee  -      Wrapping Comments RLE unna boot with coban and spandage  -                User Key  (r) = Recorded By, (t) = Taken By, (c) = Cosigned By      Initials Name Provider Type    Cooper Isabel, PT Physical Therapist                    WOUND DEBRIDEMENT  Total area of Debridement: ~5cm2  Debridement Site 1  Location- Site 1: RLE wound  Selective Debridement- Site 1: Wound Surface <20cmsq  Instruments- Site 1: tweezers  Excised Tissue Description- Site 1: minimum, slough  Bleeding- Site 1: none               PT Assessment (Last 12 Hours)       PT Evaluation and Treatment       Row Name 04/29/24 0815          Physical Therapy Time and Intention    Subjective Information complains of;weakness  -     Document Type wound care;therapy note (daily note)  -     Mode of  Treatment individual therapy;physical therapy  -MF       Row Name 04/29/24 0815          Pain Scale: FACES Pre/Post-Treatment    Pain: FACES Scale, Pretreatment 4-->hurts little more  -MF     Posttreatment Pain Rating 4-->hurts little more  -MF     Pre/Posttreatment Pain Comment chest, per family report  -MF       Row Name             Wound 04/22/24 2240 Left anterior third toe    Wound - Properties Group Placement Date: 04/22/24  -BS Placement Time: 2240 -BS Side: Left  -BS Orientation: anterior  -BS Location: third toe  -BS    Retired Wound - Properties Group Placement Date: 04/22/24  -BS Placement Time: 2240 -BS Side: Left  -BS Orientation: anterior  -BS Location: third toe  -BS    Retired Wound - Properties Group Date first assessed: 04/22/24  -BS Time first assessed: 2240 -BS Side: Left  -BS Location: third toe  -BS      Row Name 04/29/24 0815          Wound 04/22/24 2240 Right posterior plantar Blisters    Wound - Properties Group Placement Date: 04/22/24  -BS Placement Time: 2240 -BS Side: Right  -BS Orientation: posterior  -BS Location: plantar  -BS Primary Wound Type: Blisters  -BS    Dressing Appearance dry;intact  -MF     Base dry;pink  -MF     Periwound intact  -MF     Periwound Temperature warm  -MF     Periwound Skin Turgor soft  -MF     Edges irregular  -MF     Wound Length (cm) 2.5 cm  -MF     Wound Width (cm) 3 cm  -MF     Wound Depth (cm) 0 cm  -MF     Wound Surface Area (cm^2) 7.5 cm^2  -MF     Wound Volume (cm^3) 0 cm^3  -MF     Drainage Amount none  -MF     Dressing Care dressing changed  -MF     Periwound Care cleansed with pH balanced cleanser  -MF     Retired Wound - Properties Group Placement Date: 04/22/24  -BS Placement Time: 2240 -BS Side: Right  -BS Orientation: posterior  -BS Location: plantar  -BS Primary Wound Type: Blisters  -BS    Retired Wound - Properties Group Date first assessed: 04/22/24  -BS Time first assessed: 2240  -BS Side: Right  -BS Location: plantar  -BS Primary  Wound Type: Blisters  -BS      Row Name 04/29/24 0815          Wound 04/22/24 2240 Right lower leg Other (comment)    Wound - Properties Group Placement Date: 04/22/24  -BS Placement Time: 2240  -BS Side: Right  -BS Orientation: lower  -BS Location: leg  -BS Primary Wound Type: Other  -BS    Wound Image Images linked: 1  -MF     Dressing Appearance dry;intact  -MF     Base moist;pink;red  -MF     Periwound intact;dry  -MF     Periwound Temperature warm  -MF     Periwound Skin Turgor soft  -MF     Edges irregular  -MF     Wound Length (cm) 9 cm  -MF     Wound Width (cm) 2.5 cm  -MF     Wound Depth (cm) 0.1 cm  -MF     Wound Surface Area (cm^2) 22.5 cm^2  -MF     Wound Volume (cm^3) 2.25 cm^3  -MF     Drainage Characteristics/Odor serosanguineous  -MF     Drainage Amount small  -MF     Care, Wound irrigated with;wound cleanser;debrided  -MF     Dressing Care dressing changed  -MF     Periwound Care dry periwound area maintained  -MF     Retired Wound - Properties Group Placement Date: 04/22/24  -BS Placement Time: 2240  -BS Side: Right  -BS Orientation: lower  -BS Location: leg  -BS Primary Wound Type: Other  -BS    Retired Wound - Properties Group Date first assessed: 04/22/24  -BS Time first assessed: 2240 -BS Side: Right  -BS Location: leg  -BS Primary Wound Type: Other  -BS      Row Name             Wound 04/27/24 2130 Bilateral gluteal MASD (Moisture associated skin damage)    Wound - Properties Group Placement Date: 04/27/24  -AM Placement Time: 2130 -AM Side: Bilateral  -AM Location: gluteal  -AM Primary Wound Type: MASD  -AM    Retired Wound - Properties Group Placement Date: 04/27/24  -AM Placement Time: 2130 -AM Side: Bilateral  -AM Location: gluteal  -AM Primary Wound Type: MASD  -AM    Retired Wound - Properties Group Date first assessed: 04/27/24  -AM Time first assessed: 2130 -AM Side: Bilateral  -AM Location: gluteal  -AM Primary Wound Type: MASD  -AM      Row Name 04/29/24 0815          Coping     Observed Emotional State calm;cooperative;quiet  -     Verbalized Emotional State acceptance  -     Trust Relationship/Rapport care explained  -       Row Name 04/29/24 0815          Plan of Care Review    Plan of Care Reviewed With patient;spouse  -     Outcome Evaluation RLE unna boot changed today with good overall reduction in edema and wound size with use of unna boot and LE elevation. PT will f/u in 3-4 days for further debridement as needed.  -       Row Name 04/29/24 0815          Positioning and Restraints    Pre-Treatment Position in bed  -     Post Treatment Position bed  -     In Bed supine;call light within reach  -               User Key  (r) = Recorded By, (t) = Taken By, (c) = Cosigned By      Initials Name Provider Type    MF Cooper Ni, PT Physical Therapist    BS Rasta, Vivian CONNER RN Registered Nurse    Lv Darby RN Registered Nurse                  Physical Therapy Education       Title: PT OT SLP Therapies (Done)       Topic: Physical Therapy (Done)       Point: Mobility training (Done)       Learning Progress Summary             Patient Eager, E, VU,DU,NR by  at 4/24/2024 1452    Comment: Reviewed safety/technique w/transfers, ambulation, HEP, PT POC    Acceptance, E, VU by KR at 4/23/2024 1120   Family Acceptance, E, VU by KR at 4/23/2024 1120                         Point: Home exercise program (Done)       Learning Progress Summary             Patient Eager, E, VU,DU,NR by  at 4/24/2024 1452    Comment: Reviewed safety/technique w/transfers, ambulation, HEP, PT POC                         Point: Body mechanics (Done)       Learning Progress Summary             Patient Eager, E, VU,DU,NR by  at 4/24/2024 1452    Comment: Reviewed safety/technique w/transfers, ambulation, HEP, PT POC    Acceptance, E, VU by KR at 4/23/2024 1120   Family Acceptance, E, VU by KR at 4/23/2024 1120                         Point: Precautions (Done)       Learning Progress  Summary             Patient Eager, E, VU,DU,NR by  at 4/24/2024 1452    Comment: Reviewed safety/technique w/transfers, ambulation, HEP, PT POC    Acceptance, E, VU by KR at 4/23/2024 1120   Family Acceptance, E, VU by KR at 4/23/2024 1120                                         User Key       Initials Effective Dates Name Provider Type Discipline     06/01/21 -  Jenna Stanley, PT Physical Therapist PT    ELENITA 12/30/22 -  Tiffanie Erwin, PT Physical Therapist PT                    Recommendation and Plan  Anticipated Equipment Needs at Discharge (PT): front wheeled walker  Anticipated Discharge Disposition (PT): inpatient rehabilitation facility  Planned Therapy Interventions (PT): wound care, patient/family education  Therapy Frequency (PT): daily  Plan of Care Reviewed With: patient, spouse           Outcome Evaluation: RLE unna boot changed today with good overall reduction in edema and wound size with use of unna boot and LE elevation. PT will f/u in 3-4 days for further debridement as needed.  Plan of Care Reviewed With: patient, spouse            Time Calculation   PT Charges       Row Name 04/29/24 0815             Time Calculation    Start Time 0815  -MF      PT Goal Re-Cert Due Date 05/03/24  -         Untimed Charges    29580-Unna Boot 15  -MF      86607-Xnpnurols debridement 15  -MF         Total Minutes    Untimed Charges Total Minutes 30  -MF       Total Minutes 30  -MF                User Key  (r) = Recorded By, (t) = Taken By, (c) = Cosigned By      Initials Name Provider Type    Cooper Isabel, PT Physical Therapist                            PT G-Codes  Outcome Measure Options: AM-PAC 6 Clicks Daily Activity (OT)  AM-PAC 6 Clicks Score (PT): 9  AM-PAC 6 Clicks Score (OT): 16  Modified Trimble Scale: 4 - Moderately severe disability.  Unable to walk without assistance, and unable to attend to own bodily needs without assistance.       Cooper Ni, PT  4/29/2024

## 2024-04-29 NOTE — PROGRESS NOTES
" [facility] - Progress Note    Jermaine Singh  1945  N205/1    04/29/24, 08:05 EDT      Chief Complaint: Following for cardiac arrest    Subjective:   Currently on RA, maintaining sats.  Still on low dose Levo for support, along with midodrine.    Wife at bedside. Patient confused.        Review of Systems:  Pertinent positives are listed above and in physical exam.  All others have been reviewed and are negative.    acetylcysteine, 3 mL, Nebulization, BID - RT  [Held by provider] apixaban, 5 mg, Oral, Q12H  aspirin, 81 mg, Oral, Daily  atorvastatin, 20 mg, Oral, Nightly  azelastine, 2 spray, Each Nare, BID  [Held by provider] carvedilol, 3.125 mg, Oral, BID With Meals  insulin glargine, 8 Units, Subcutaneous, Nightly  insulin regular, 2-9 Units, Subcutaneous, Q6H  lactobacillus acidophilus, 1 capsule, Oral, Daily  levothyroxine, 88 mcg, Oral, Q AM  midodrine, 10 mg, Oral, TID AC  pantoprazole, 40 mg, Intravenous, Q AM  piperacillin-tazobactam, 3.375 g, Intravenous, Q8H  polyethylene glycol, 17 g, Oral, Daily  sodium chloride, 10 mL, Intravenous, Q12H  sodium chloride, 10 mL, Intravenous, Q12H  tamsulosin, 0.4 mg, Oral, Daily        Objective:  Vitals:   height is 190 cm (74.8\") and weight is 87.7 kg (193 lb 5.5 oz). His bladder temperature is 97.5 °F (36.4 °C). His blood pressure is 96/72 and his pulse is 60. His respiration is 16 and oxygen saturation is 97%.     Intake/Output Summary (Last 24 hours) at 4/29/2024 0805  Last data filed at 4/29/2024 0601  Gross per 24 hour   Intake 1743.5 ml   Output 695 ml   Net 1048.5 ml       Physical Exam:  General:  WN, Appears stated age  CV:  Normal S1,S2. No murmur, rub, or gallop. + Pacemaker  Resp:  CTA Soy, equal, nonlabored.  Abd:  Soft, + BS, no organomegaly. Nontender to palpation.  Extrem:  RLE with wrap, trace LLE.           Results from last 7 days   Lab Units 04/29/24  0455   WBC 10*3/mm3 9.28   HEMOGLOBIN g/dL 7.9*   HEMATOCRIT % 23.8* " "  PLATELETS 10*3/mm3 205     Results from last 7 days   Lab Units 04/29/24  0455   SODIUM mmol/L 142   POTASSIUM mmol/L 4.0   CHLORIDE mmol/L 112*   CO2 mmol/L 19.0*   BUN mg/dL 87*   CREATININE mg/dL 2.52*   CALCIUM mg/dL 7.8*   BILIRUBIN mg/dL 0.6   ALK PHOS U/L 92   ALT (SGPT) U/L 320*   AST (SGOT) U/L 38   GLUCOSE mg/dL 170*         Results from last 7 days   Lab Units 04/22/24  1713   TSH uIU/mL 1.940               Tele: V paced      Assessment/Plan:  Cardiac arrest/VF arrest  Substantial substrate for VF arrest including ischemic heart disease with severely diffusely diseased LAD as well as newly identified severe LV dysfunction with EF <20%  Films reviewed by cards 4/25; \"Based on prebypass angiogram, I do not suspect readily \"intervenable\" lesions to correct ischemic substrate.  LAD is severely diffusely diseased and would not be suitable for percutaneous treatment.  Frankly, I am impressed that Dr. Au was able to bypass the LAD in 2019.\"  Recommend against emergent cardiac catheterization given his hemodynamic instability and unlikelihood of \"targets\" to correct ischemic substrate  Continue aspirin and statin  No beta-blocker due to hypotension, resume as pressor support weaned/BP tolerates  Discontinue IV amiodarone.  If recurrence of VT/VF, start lidocaine  Cardiology will continue to follow     Severe LV dysfunction with EF <20%  Newly diagnosed.  Last LVEF 48% in 2022  Unclear whether this is related to stunning from VF arrest or whether this has been pre-existing his admission.  I suspect the latter as patient has had dwindling functional capacity  Presently with some element of cardiogenic shock which prohibits GDMT  Patient has dual chamber [employer] permanent pacemaker.  Will have device interrogated today.     Multifactorial shock  Cardiogenic, possibly septic  Continue norepinephrine his primary pressor, epinephrine is secondary pressor  Wean vasopressors to maintain MAP >65 mmHg     Elevated " troponin, POA  Initial troponin measurements on admission elevated but flat and not consistent with myocardial infarction  If troponin is repeated, I suspect will be elevated but difficult to determine whether related to a type I MI or type II MI due to fixed CAD, CHF, and arrhythmia     Type 2 diabetes mellitus not on insulin with hyperglycemia  Uncontrolled (untreated?) diabetes prior to admission  Has only not candidate for SGL 2 inhibitor due to renal insufficiency  Start Glucomander protocol     Hyperlipidemia with goal LDL <70  LDL well-controlled  Must hold statin now as LFTs >3x ULN     Stage IIIb CKD  Hold losartan due to hypotension and anticipated [place] from cardiac event     History of DVT  Hold apixaban due to worsening renal insufficiency and possibly developing coagulopathy due to shock liver     Paroxysmal Atrial Fibrillation  [employer] device interrogated Friday, reviewed.  Report on  chart.  Hold NOAC with shock liver/worsening renal function.       I discussed the patient's findings and my recommendations with the patient, any present family members, and the nursing staff.  Cooper Apodaca MD saw and examined patient, verified hx and PE, read all radiographic studies, reviewed labs and micro data, and formulated dx, plan for treatment and all medical decision making.      Megan Shukla PA-C  04/29/24, 08:08EDT

## 2024-04-29 NOTE — NURSING NOTE
Woc consulted for suspected deep tissue injury.    Patient presents with chronic MASD discoloration to the gluteal crests.  Bilaterally.  There is redness and a little bit of purple hyperpigmentation.  However on the right buttock in the middle of the peak of the gluteal crest is 2 small open areas with a pale maroon-purple wound bed.  Neither open area is blanching.    Although this is not a bony bony prominence the nonblanching of the purple with open epithelium do warrant extra assessment.    Woc will order Venelex twice daily with Z guard and follow-up tomorrow.

## 2024-04-29 NOTE — PROGRESS NOTES
INFECTIOUS DISEASE Progress Note    Jermaine Singh  1945  [id number]    Admission Date: 4/22/2024      Requesting Provider: No ref. provider found  Evaluating Physician: Lincoln Padilla MD    Reason for Consultation:  UTI    History of present illness:    4/23/24: Patient is a 79 y.o. male  with a history of type 2 diabetes mellitus, peripheral neuropathy, stage III chronic kidney disease, bilateral DVTs, paroxysmal atrial fibrillation, sick sinus syndrome status post pacemaker placement, CHF, peripheral vascular disease, and left diabetic foot infection who is seen today for evaluation of UTI.  He had a history of UTI last month with urine cultures and early March growing yeast.  He was apparently treated with an antifungal agent.    Over the last few days he noted the onset of profound weakness and fatigue.  He also noticed some dysuria.  He was brought to the emergency room and found to have pyuria and bacteriuria with leukocytosis.   Urine culture was sent and he was started on ceftriaxone.   His white blood cell count today is 11.8.   He was noted to have a blister over his right fifth metatarsal head and a chronic right pretibial wound.    4/24/24: He remains afebrile.  His urine culture was finalized as negative.  I have asked the laboratory to hold the urine culture for yeast but they did not and it was disposed of last night.   He states that his dysuria is improved today.  Feels better.     4/25/2024:  He remains afebrile.  White blood cell count today is 6.7.  Creatinine is 1.99.   Urinalysis yesterday revealed too numerous to count white blood cells.  He denied dysuria this morning.  After I saw him he suffered a V-fib cardiac arrest and was transferred to the ICU.  He is endotracheally intubated    4/26/24:   His maximum temperature over the last 24 hours is 99.5.  Repeat urine culture from 4/24 is pending.  He remains on ventilatory support.    Creatinine is 2.28.  ALT is  1028.  White  blood cell count is 9.95.  O2 saturation is 100% on an FiO2 of 30%.  Chest x-ray reveals no pulmonary infiltrates. \    Later this morning he was extubated.  He is confused.  He knows his name and his birthdate.  He cannot answer more complex questions.    4/27/2024:  His maximum temperature over the last 24 hours is 99.5.  His left ventricular ejection fraction is less than 20%.  His white blood cell count is 2.37.  ALT is down to 626.   Chest x-ray 4/27 reveals moderate hemoglobin airspace disease.   O2 saturation is 99% on 4 L.   He is on Zosyn therapy.   Later today his oxygen demand increased and he was placed   On BiPAP.     4/28/2024:   He has remained afebrile.   White blood cell count is 10.1. His creatinine is 2.65 ALT is 450.  His oxygen demand is down to 1 liter today. Chest x-ray today reveals interval improvement in the right upper lobe infiltrate.  He is coughing and has some sputum production.  He remains confused and did not recognize his wife and daughter today      4/29/2024: He remains afebrile.  His creatinine is 2.52.  ALT is 320.  White blood cell count is 9.3.    He is now on 1 L with an O2 saturation of 96%.  He is more alert and responsive today.  He is less confused.    Past Medical History:   Diagnosis Date    Allergic     Arthritis     Asthma     Coronary artery disease     Diabetes mellitus 2000    started on inuslin 12/2018; started on po meds in 2000; checking blood sugars daily     Diabetic foot infection 07/23/2023    Disease of thyroid gland     po meds daily for hypothyroidism     Elevated serum creatinine 11/15/2022    Elevated troponin 10/02/2022    Generalized weakness 11/15/2022    History of fracture as a child     rt leg- severe     Hyperlipidemia     Hypertension     Hypothyroidism     PAF (paroxysmal atrial fibrillation) 07/23/2023    Peripheral neuropathy     Peripheral vascular disease     s/p angiogram 2/19-needs stent in left leg     Pleural effusion, bilateral  11/15/2022    Proximal phalanx fracture of the second digit extending into the second metatarsal joint 07/28/2022    RBBB     Right knee pain     Status post amputation of great toe and second toe of left foot 07/23/2023    Status post amputation of great toe, left 11/15/2022    Unstable angina 02/12/2019    Added automatically from request for surgery [id number]    Vitamin D deficiency        Past Surgical History:   Procedure Laterality Date    AMPUTATION DIGIT Left 01/17/2023    Procedure: AMPUTATION DIGIT LEFT;  Surgeon: Gopal Márquez MD;  Location:  [facility];  Service: Vascular;  Laterality: Left;    APPENDECTOMY      CARDIAC CATHETERIZATION N/A 02/14/2019    Procedure: Left Heart Cath;  Surgeon: Cooper Apodaca MD;  Location:  [facility];  Service: Cardiology    CARDIAC ELECTROPHYSIOLOGY PROCEDURE N/A 05/18/2022    Procedure: DEVICE IMPLANT;  Surgeon: Cooper Apodaca MD;  Location:  [facility];  Service: Cardiology;  Laterality: N/A;    CARDIAC SURGERY      COLONOSCOPY      CORONARY ARTERY BYPASS GRAFT N/A 03/22/2019    Procedure: MEDIAN STERNOTOMY, CORONARY ARTERY BYPASS GRAFT X3, UTILIZING THE LEFT INTERNAL MAMMARY ARTERY, EVH AND OPEN HARVEST OF THE RIGHT GREATER SAPHENOUS VEIN, EXPLORATION OF THE LEFT LEG;  Surgeon: Ap Au MD;  Location:  [facility];  Service: Cardiothoracic    EYE SURGERY Bilateral     cataracts     FRACTURE SURGERY      INTERVENTIONAL RADIOLOGY PROCEDURE N/A 07/29/2021    Procedure: Abdominal Aortagram with Runoff;  Surgeon: Jermaine Hernandez MD;  Location:  [facility];  Service: Cardiovascular;  Laterality: N/A;    KNEE ARTHROSCOPY      right x 2, left x 1    LACERATION REPAIR      right leg    LEG SURGERY      2 for fracture of rt leg     TONSILLECTOMY      Adnoidectomy    TRANS METATARSAL AMPUTATION Left 08/02/2022    Procedure: GREAT TOE AMPUTATION LEFT;  Surgeon: Gopal Márquez MD;  Location:  [facility];   Service: Vascular;  Laterality: Left;       Family History   Problem Relation Age of Onset    Coronary artery disease Mother     Diabetes Mother     Hyperlipidemia Mother     Cancer Father        Social History     Socioeconomic History    Marital status:     Number of children: 2   Tobacco Use    Smoking status: Never     Passive exposure: Past    Smokeless tobacco: Never   Vaping Use    Vaping status: Never Used   Substance and Sexual Activity    Alcohol use: Not Currently     Comment: every 2-3 months    Drug use: No    Sexual activity: Not Currently     Partners: Female       Allergies   Allergen Reactions    Vancomycin Other (See Comments)     Kidney failure per last admission         Medication:    Current Facility-Administered Medications:     acetaminophen (TYLENOL) tablet 650 mg, 650 mg, Oral, Q6H PRN, Maricel Peraza, APRN, 650 mg at 04/25/24 0940    acetylcysteine (MUCOMYST) 20 % nebulizer solution 3 mL, 3 mL, Nebulization, BID - RT, Nathalia Mckoen DNP, APRN, 3 mL at 04/28/24 2145    albuterol (PROVENTIL) nebulizer solution 0.083% 2.5 mg/3mL, 2.5 mg, Nebulization, BID PRN, Nathalia Mckeon DNP, APRN, 2.5 mg at 04/28/24 2145    amiodarone 360 mg in 200 mL D5W infusion, 0.25 mg/min, Intravenous, Continuous, Golden Madison MD, Last Rate: 8.33 mL/hr at 04/28/24 2057, 0.25 mg/min at 04/28/24 2057    [Held by provider] apixaban (ELIQUIS) tablet 5 mg, 5 mg, Oral, Q12H, Maricel Peraza APRN, 5 mg at 04/25/24 0840    aspirin chewable tablet 81 mg, 81 mg, Oral, Daily, Maricel Peraza APRN, 81 mg at 04/28/24 1410    atorvastatin (LIPITOR) tablet 20 mg, 20 mg, Oral, Nightly, Golden Madison MD, 20 mg at 04/28/24 2057    azelastine (ASTELIN) nasal spray 2 spray, 2 spray, Each Nare, BID, Maricel Peraza, APRN, 2 spray at 04/28/24 2159    sennosides-docusate (PERICOLACE) 8.6-50 MG per tablet 2 tablet, 2 tablet, Oral, BID PRN **AND** polyethylene glycol (MIRALAX) packet 17 g, 17 g, Oral,  Daily PRN, 17 g at 04/22/24 2225 **AND** [DISCONTINUED] bisacodyl (DULCOLAX) EC tablet 5 mg, 5 mg, Oral, Daily PRN, 5 mg at 04/22/24 2225 **AND** bisacodyl (DULCOLAX) suppository 10 mg, 10 mg, Rectal, Daily PRN, Maricel Peraza APRN    [Held by provider] carvedilol (COREG) tablet 3.125 mg, 3.125 mg, Oral, BID With Meals, Maricel Peraza, APRN, 3.125 mg at 04/22/24 2214    dextrose (D50W) (25 g/50 mL) IV injection 25 g, 25 g, Intravenous, Q15 Min PRN, Maricel Peraza APRN    EPINEPHrine 10 mg in 250 mL infusion, 0.02-0.3 mcg/kg/min, Intravenous, Titrated, Eyad Ledesma MD, Stopped at 04/25/24 1925    famotidine (PEPCID) tablet 20 mg, 20 mg, Oral, BID PRN, Maricel Peraza APRN, 20 mg at 04/22/24 2214    glucagon (GLUCAGEN) injection 1 mg, 1 mg, Intramuscular, Q15 Min PRN, Maricel Peraza APRN    HYDROcodone-acetaminophen (NORCO) 5-325 MG per tablet 1 tablet, 1 tablet, Oral, Q4H PRN, Hailee Shukla, DO, 1 tablet at 04/28/24 1852    insulin glargine (LANTUS, SEMGLEE) injection 8 Units, 8 Units, Subcutaneous, Nightly, Luz Elena Batista, DO, 8 Units at 04/28/24 2056    insulin regular (humuLIN R,novoLIN R) injection 2-9 Units, 2-9 Units, Subcutaneous, Q6H, Sarah Moreland, PharmD, 2 Units at 04/29/24 0603    lactobacillus acidophilus (RISAQUAD) capsule 1 capsule, 1 capsule, Oral, Daily, Maricel Peraza, APRN, 1 capsule at 04/28/24 1410    levothyroxine (SYNTHROID, LEVOTHROID) tablet 88 mcg, 88 mcg, Oral, Q AM, Maricel Peraza APRN, 88 mcg at 04/29/24 0604    Magnesium Low Dose Replacement - Follow Nurse / BPA Driven Protocol, , Does not apply, PRN, Maricel Peraza APRN    midazolam (VERSED) injection 0.5 mg, 0.5 mg, Intravenous, Q4H PRN, Eyad Ledesma MD    midodrine (PROAMATINE) tablet 10 mg, 10 mg, Oral, TID AC, Golden Madison MD, 10 mg at 04/28/24 1840    nitroglycerin (NITROSTAT) SL tablet 0.4 mg, 0.4 mg, Sublingual, Q5 Min PRN, Maricel Peraza APRN     norepinephrine (LEVOPHED) 8 mg in 250 mL NS infusion (premix), 0.02-0.3 mcg/kg/min, Intravenous, Titrated, Eyad Ledesma MD, Last Rate: 4.37 mL/hr at 04/29/24 0446, 0.03 mcg/kg/min at 04/29/24 0446    pantoprazole (PROTONIX) injection 40 mg, 40 mg, Intravenous, Q AM, Sarah Moreland, PharmD, 40 mg at 04/29/24 0604    phenylephrine (JENNIFER-SYNEPHRINE) 50 mg in sodium chloride 0.9 % 250 mL infusion, 0.5-3 mcg/kg/min, Intravenous, Titrated, Eyad Ldeesma MD, Stopped at 04/25/24 1547    piperacillin-tazobactam (ZOSYN) 3.375 g IVPB in 100 mL NS MBP (CD), 3.375 g, Intravenous, Q8H, Eyad Ledesma MD, 3.375 g at 04/29/24 0604    polyethylene glycol (MIRALAX) packet 17 g, 17 g, Oral, Daily, WestArtur MD, 17 g at 04/24/24 0901    prochlorperazine (COMPAZINE) injection 5 mg, 5 mg, Intravenous, Q6H PRN, Maricel Peraza APRN, 2.5 mg at 04/26/24 1316    sodium chloride 0.9 % flush 10 mL, 10 mL, Intravenous, PRN, Maricel Peraza, APRN    sodium chloride 0.9 % flush 10 mL, 10 mL, Intravenous, Q12H, Maricel Peraza, APRN, 10 mL at 04/28/24 2159    sodium chloride 0.9 % flush 10 mL, 10 mL, Intravenous, PRN, Case, Hailee V., DO    sodium chloride 0.9 % flush 10 mL, 10 mL, Intravenous, Q12H, Case, Hailee V., DO, 10 mL at 04/28/24 2159    tamsulosin (FLOMAX) 24 hr capsule 0.4 mg, 0.4 mg, Oral, Daily, Maricel Peraza APRN, 0.4 mg at 04/25/24 0840    Antibiotics:  Anti-Infectives (From admission, onward)      Ordered     Dose/Rate Route Frequency Start Stop    04/26/24 1356  piperacillin-tazobactam (ZOSYN) 3.375 g IVPB in 100 mL NS MBP (CD)        Ordering Provider: Eyad Ledesma MD    3.375 g  over 4 Hours Intravenous Every 8 Hours 04/26/24 2200 04/29/24 2159    04/26/24 1356  piperacillin-tazobactam (ZOSYN) 3.375 g IVPB in 100 mL NS MBP (CD)        Ordering Provider: Eyad Ledesma MD    3.375 g  over 30 Minutes Intravenous Once 04/26/24 1445 04/26/24 1557              Review  "of Systems:  See HPI      Physical Exam:   Vital Signs  Temp (24hrs), Av.7 °F (36.5 °C), Min:97.5 °F (36.4 °C), Max:98.1 °F (36.7 °C)    Temp  Min: 97.5 °F (36.4 °C)  Max: 98.1 °F (36.7 °C)  BP  Min: 71/52  Max: 167/128  Pulse  Min: 59  Max: 75  Resp  Min: 14  Max: 21  SpO2  Min: 95 %  Max: 100 %    GENERAL:   Alert and responsive.  HEENT: Normocephalic, atraumatic.  PERRL. EOMI. No conjunctival injection. No icterus.  No labial ulcers  NECK: Supple   HEART:  irregular rate   LUNGS:    He has mild right greater than left rhonchi  ABDOMEN: Soft, nontender, nondistended. No rebound or guarding. NO mass or HSM.  EXT:   his right distal leg is in an  Unna boot.  Photos of his wound from  am reveal decrease in erythema of the pretibial wound  :   Fonseca catheter in place.  MSK: No joint effusions or erythema  SKIN: Warm and dry without cutaneous eruptions on Inspection/palpation.    NEURO:   He is alert and responsive.  He recognized me today but did not remember my name.  He knows that it is 2024 and he notes that he is in [facility].    Laboratory Data    Results from last 7 days   Lab Units 24  0455 24  0405 24  0607   WBC 10*3/mm3 9.28 10.11 9.89   HEMOGLOBIN g/dL 7.9* 8.0* 7.8*   HEMATOCRIT % 23.8* 23.5* 23.4*   PLATELETS 10*3/mm3 205 183 150     Results from last 7 days   Lab Units 24  0455   SODIUM mmol/L 142   POTASSIUM mmol/L 4.0   CHLORIDE mmol/L 112*   CO2 mmol/L 19.0*   BUN mg/dL 87*   CREATININE mg/dL 2.52*   GLUCOSE mg/dL 170*   CALCIUM mg/dL 7.8*     Results from last 7 days   Lab Units 24  0455   ALK PHOS U/L 92   BILIRUBIN mg/dL 0.6   ALT (SGPT) U/L 320*   AST (SGOT) U/L 38                         Estimated Creatinine Clearance: 29.5 mL/min (A) (by C-G formula based on SCr of 2.52 mg/dL (H)).      Microbiology:  No results found for: \"ACANTHNAEG\", \"AFBCX\", \"BPERTUSSISCX\", \"BLOODCX\"  No results found for: \"BCIDPCR\", \"CXREFLEX\", \"CSFCX\", " "\"CULTURETIS\"  No results found for: \"CULTURES\", \"HSVCX\", \"URCX\"  No results found for: \"EYECULTURE\", \"GCCX\", \"HSVCULTURE\", \"LABHSV\"  No results found for: \"LEGIONELLA\", \"MRSACX\", \"MUMPSCX\", \"MYCOPLASCX\"  No results found for: \"NOCARDIACX\", \"STOOLCX\"  Urine Culture   Date Value Ref Range Status   04/22/2024 No growth  Preliminary     No results found for: \"VIRALCULTU\", \"WOUNDCX\"        Radiology:  Imaging Results (Last 72 Hours)       Procedure Component Value Units Date/Time    XR Chest 1 View [[id number]] Collected: 04/28/24 1831     Updated: 04/28/24 1834    Narrative:      XR CHEST 1 VW    Date of Exam: 4/28/2024 6:04 PM EDT    Indication: picc placement    Comparison: Same day radiographs    Findings:  Left chest wall cardiac device with distal lead tips in unchanged position. Interval placement of a right approach central venous catheter with distal tip overlying the superior vena cava. Unchanged cardiomediastinal silhouette with cardiac surgical   changes. No new focal airspace consolidation. No pleural effusion or pneumothorax. Osseous structures are unchanged.      Impression:      Impression:  Interval placement of right approach central venous catheter with distal tip overlying the superior vena cava. No pneumothorax.      Electronically Signed: Navneet Crisostomo MD    4/28/2024 6:31 PM EDT    Workstation ID: [id number]    SLP FEES - Fiberoptic Endo Eval Swallow [[id number]] Resulted: 04/28/24 1401     Updated: 04/28/24 1401    Narrative:      This procedure was auto-finalized with no dictation required.    XR Chest 1 View [[id number]] Collected: 04/28/24 1013     Updated: 04/28/24 1019    Narrative:      XR CHEST 1 VW    Date of Exam: 4/28/2024 4:30 AM EDT    Indication: Hypoxia, aspiration    Comparison: 4/27/2024    Findings:  Heart size and pulmonary vasculature are stable. There is been resolution of right upper lobe airspace disease. The lungs are clear other than mild atelectasis in the lung bases. " Costophrenic angles are sharp      Impression:      Impression:    1. Interval improvement in right upper lobe airspace disease.    2. Bibasilar atelectasis      Electronically Signed: Miguel Nava    4/28/2024 10:16 AM EDT    Workstation ID: [id number]    XR Chest 1 View [[id number]] Collected: 04/27/24 0209     Updated: 04/27/24 0214    Narrative:      XR CHEST 1 VW    Date of Exam: 4/27/2024 1:02 AM EDT    Indication: resp failure    Comparison: Chest radiograph dated April 26, 2024 1307 hours    Findings:  There are postoperative changes from midline sternotomy and coronary artery bypass grafting. There is a left-sided subclavian pacemaker device. Pulmonary vascular markings are normal. There is patchy right upper lobe airspace disease. The left lung is   clear.      Impression:      Impression:  Mild right upper lobe airspace disease which could be from pneumonia.    Electronically Signed: Golden Sullivan MD    4/27/2024 2:11 AM EDT    Workstation ID: [id number]    XR Chest 1 View [[id number]] Collected: 04/26/24 1325     Updated: 04/26/24 1329    Narrative:      XR CHEST 1 VW    Date of Exam: 4/26/2024 12:48 PM EDT    Indication: resp failure    Comparison: Earlier the same day.    Findings:  Interval extubation. There is no new focal opacity. There is no enlarging effusion or distinct pneumothorax. Unchanged mild cardiac enlargement.      Impression:      Impression:  Interval extubation with remainder of exam unchanged      Electronically Signed: Christian Wang MD    4/26/2024 1:26 PM EDT    Workstation ID: [id number]          I read his chest x-ray of 4/28      Impression:    1.    Right upper lobe aspiration pneumonia-he is now on Zosyn therapy.  He is clinically improving. We should be able to discontinue the Zosyn soon.   2.   V-fib arrest-status post resuscitation.  He is on amiodarone   3.  Right fifth metatarsal wound/bullous lesion-without evidence of cellulitis   4.  Chronic right pretibial dermatitis-without  evidence of cellulitis  5.  Type 2 diabetes mellitus  6.  coronary artery disease/status post CABG, 3/22/2019  7.  Severe systolic heart failure-systolic function is severely impaired with an ejection fraction of less than 20%.  8.  Stage IIIb chronic kidney disease  9.  Paroxysmal atrial fibrillation  10.  Acute hypoxic respiratory failure- improved today with the decreased oxygen demand.  11.  Ischemic hepatitis-this continues to improve with decreased transaminitis.  12.  Pyuria- with a negative urine culture.  13.  Anoxic encephalopathy- this is slowly improving    PLAN/RECOMMENDATIONS:   1.    Continue with a right leg Unna boot  2.  Continue intravenous Zosyn-I will plan to stop his Zosyn soon    I discussed his complex situation with Dr. Angeles.  He is clinically improved but remains critically ill. He has multiorgan system dysfunction with a significant mortality risk.  I spent over 30 minutes on his care today.      Lincoln Padilla MD  4/29/2024  07:16 EDT

## 2024-04-29 NOTE — PLAN OF CARE
Goal Outcome Evaluation:  Pt confused to situation, time  Weaned O2 from 1L to RA  Levo infusing at 0.03 mcg/kg/min, amiodarone infusing at 0.25 mcg/min  Afebrile  UOP only 270 mL  Femoral TLDL removed, site clean/dry/soft  Spouse at bedside and updated.

## 2024-04-30 ENCOUNTER — APPOINTMENT (OUTPATIENT)
Dept: CARDIOLOGY | Facility: HOSPITAL | Age: 79
End: 2024-04-30
Payer: MEDICARE

## 2024-04-30 LAB
ALBUMIN SERPL-MCNC: 3 G/DL (ref 3.5–5.2)
ALBUMIN/GLOB SERPL: 1.4 G/DL
ALP SERPL-CCNC: 85 U/L (ref 39–117)
ALT SERPL W P-5'-P-CCNC: 218 U/L (ref 1–41)
ANION GAP SERPL CALCULATED.3IONS-SCNC: 13 MMOL/L (ref 5–15)
AST SERPL-CCNC: 22 U/L (ref 1–40)
BASOPHILS # BLD AUTO: 0.05 10*3/MM3 (ref 0–0.2)
BASOPHILS NFR BLD AUTO: 0.5 % (ref 0–1.5)
BILIRUB SERPL-MCNC: 0.6 MG/DL (ref 0–1.2)
BUN SERPL-MCNC: 82 MG/DL (ref 8–23)
BUN/CREAT SERPL: 30.6 (ref 7–25)
CALCIUM SPEC-SCNC: 7.8 MG/DL (ref 8.6–10.5)
CHLORIDE SERPL-SCNC: 110 MMOL/L (ref 98–107)
CO2 SERPL-SCNC: 17 MMOL/L (ref 22–29)
CREAT SERPL-MCNC: 2.68 MG/DL (ref 0.76–1.27)
DEPRECATED RDW RBC AUTO: 45.6 FL (ref 37–54)
EGFRCR SERPLBLD CKD-EPI 2021: 23.5 ML/MIN/1.73
EOSINOPHIL # BLD AUTO: 0.32 10*3/MM3 (ref 0–0.4)
EOSINOPHIL NFR BLD AUTO: 3.5 % (ref 0.3–6.2)
ERYTHROCYTE [DISTWIDTH] IN BLOOD BY AUTOMATED COUNT: 14 % (ref 12.3–15.4)
GLOBULIN UR ELPH-MCNC: 2.2 GM/DL
GLUCOSE BLDC GLUCOMTR-MCNC: 181 MG/DL (ref 70–130)
GLUCOSE BLDC GLUCOMTR-MCNC: 187 MG/DL (ref 70–130)
GLUCOSE BLDC GLUCOMTR-MCNC: 202 MG/DL (ref 70–130)
GLUCOSE BLDC GLUCOMTR-MCNC: 207 MG/DL (ref 70–130)
GLUCOSE BLDC GLUCOMTR-MCNC: 225 MG/DL (ref 70–130)
GLUCOSE SERPL-MCNC: 162 MG/DL (ref 65–99)
HCT VFR BLD AUTO: 26.7 % (ref 37.5–51)
HGB BLD-MCNC: 8.8 G/DL (ref 13–17.7)
IMM GRANULOCYTES # BLD AUTO: 0.12 10*3/MM3 (ref 0–0.05)
IMM GRANULOCYTES NFR BLD AUTO: 1.3 % (ref 0–0.5)
LYMPHOCYTES # BLD AUTO: 1.06 10*3/MM3 (ref 0.7–3.1)
LYMPHOCYTES NFR BLD AUTO: 11.6 % (ref 19.6–45.3)
MAGNESIUM SERPL-MCNC: 2.8 MG/DL (ref 1.6–2.4)
MCH RBC QN AUTO: 30 PG (ref 26.6–33)
MCHC RBC AUTO-ENTMCNC: 33 G/DL (ref 31.5–35.7)
MCV RBC AUTO: 91.1 FL (ref 79–97)
MONOCYTES # BLD AUTO: 1.02 10*3/MM3 (ref 0.1–0.9)
MONOCYTES NFR BLD AUTO: 11.2 % (ref 5–12)
NEUTROPHILS NFR BLD AUTO: 6.55 10*3/MM3 (ref 1.7–7)
NEUTROPHILS NFR BLD AUTO: 71.9 % (ref 42.7–76)
NRBC BLD AUTO-RTO: 0 /100 WBC (ref 0–0.2)
PHOSPHATE SERPL-MCNC: 4.5 MG/DL (ref 2.5–4.5)
PLATELET # BLD AUTO: 232 10*3/MM3 (ref 140–450)
PMV BLD AUTO: 10.7 FL (ref 6–12)
POTASSIUM SERPL-SCNC: 4.3 MMOL/L (ref 3.5–5.2)
PROT SERPL-MCNC: 5.2 G/DL (ref 6–8.5)
QT INTERVAL: 554 MS
QT INTERVAL: 578 MS
QTC INTERVAL: 578 MS
QTC INTERVAL: 585 MS
RBC # BLD AUTO: 2.93 10*6/MM3 (ref 4.14–5.8)
SODIUM SERPL-SCNC: 140 MMOL/L (ref 136–145)
WBC NRBC COR # BLD AUTO: 9.12 10*3/MM3 (ref 3.4–10.8)

## 2024-04-30 PROCEDURE — 25010000002 HEPARIN (PORCINE) PER 1000 UNITS: Performed by: INTERNAL MEDICINE

## 2024-04-30 PROCEDURE — 93005 ELECTROCARDIOGRAM TRACING: CPT | Performed by: INTERNAL MEDICINE

## 2024-04-30 PROCEDURE — 94799 UNLISTED PULMONARY SVC/PX: CPT

## 2024-04-30 PROCEDURE — 80053 COMPREHEN METABOLIC PANEL: CPT | Performed by: INTERNAL MEDICINE

## 2024-04-30 PROCEDURE — 94761 N-INVAS EAR/PLS OXIMETRY MLT: CPT

## 2024-04-30 PROCEDURE — 97110 THERAPEUTIC EXERCISES: CPT

## 2024-04-30 PROCEDURE — 97530 THERAPEUTIC ACTIVITIES: CPT

## 2024-04-30 PROCEDURE — 63710000001 INSULIN LISPRO (HUMAN) PER 5 UNITS: Performed by: INTERNAL MEDICINE

## 2024-04-30 PROCEDURE — 63710000001 INSULIN GLARGINE PER 5 UNITS: Performed by: FAMILY MEDICINE

## 2024-04-30 PROCEDURE — 83735 ASSAY OF MAGNESIUM: CPT | Performed by: INTERNAL MEDICINE

## 2024-04-30 PROCEDURE — 84100 ASSAY OF PHOSPHORUS: CPT | Performed by: INTERNAL MEDICINE

## 2024-04-30 PROCEDURE — 99291 CRITICAL CARE FIRST HOUR: CPT | Performed by: INTERNAL MEDICINE

## 2024-04-30 PROCEDURE — 94664 DEMO&/EVAL PT USE INHALER: CPT

## 2024-04-30 PROCEDURE — 82948 REAGENT STRIP/BLOOD GLUCOSE: CPT

## 2024-04-30 PROCEDURE — 25010000002 PIPERACILLIN SOD-TAZOBACTAM PER 1 G: Performed by: INTERNAL MEDICINE

## 2024-04-30 PROCEDURE — 85025 COMPLETE CBC W/AUTO DIFF WBC: CPT | Performed by: INTERNAL MEDICINE

## 2024-04-30 RX ADMIN — HEPARIN SODIUM 5000 UNITS: 5000 INJECTION INTRAVENOUS; SUBCUTANEOUS at 21:09

## 2024-04-30 RX ADMIN — NOREPINEPHRINE BITARTRATE 0.03 MCG/KG/MIN: 0.03 INJECTION, SOLUTION INTRAVENOUS at 21:21

## 2024-04-30 RX ADMIN — PANTOPRAZOLE SODIUM 40 MG: 40 TABLET, DELAYED RELEASE ORAL at 05:02

## 2024-04-30 RX ADMIN — Medication 10 ML: at 09:58

## 2024-04-30 RX ADMIN — HYDROCODONE BITARTRATE AND ACETAMINOPHEN 1 TABLET: 5; 325 TABLET ORAL at 17:58

## 2024-04-30 RX ADMIN — SENNOSIDES AND DOCUSATE SODIUM 2 TABLET: 8.6; 5 TABLET ORAL at 20:28

## 2024-04-30 RX ADMIN — ACETYLCYSTEINE 3 ML: 200 SOLUTION ORAL; RESPIRATORY (INHALATION) at 07:55

## 2024-04-30 RX ADMIN — POLYETHYLENE GLYCOL 3350 17 G: 17 POWDER, FOR SOLUTION ORAL at 13:03

## 2024-04-30 RX ADMIN — ASPIRIN 81 MG CHEWABLE TABLET 81 MG: 81 TABLET CHEWABLE at 09:57

## 2024-04-30 RX ADMIN — INSULIN LISPRO 4 UNITS: 100 INJECTION, SOLUTION INTRAVENOUS; SUBCUTANEOUS at 13:02

## 2024-04-30 RX ADMIN — INSULIN GLARGINE 8 UNITS: 100 INJECTION, SOLUTION SUBCUTANEOUS at 20:29

## 2024-04-30 RX ADMIN — HYDROCODONE BITARTRATE AND ACETAMINOPHEN 1 TABLET: 5; 325 TABLET ORAL at 10:19

## 2024-04-30 RX ADMIN — Medication 1 APPLICATION: at 09:57

## 2024-04-30 RX ADMIN — MIDODRINE HYDROCHLORIDE 10 MG: 10 TABLET ORAL at 05:02

## 2024-04-30 RX ADMIN — TAMSULOSIN HYDROCHLORIDE 0.4 MG: 0.4 CAPSULE ORAL at 09:57

## 2024-04-30 RX ADMIN — PIPERACILLIN AND TAZOBACTAM 3.38 G: 3; .375 INJECTION, POWDER, LYOPHILIZED, FOR SOLUTION INTRAVENOUS at 21:32

## 2024-04-30 RX ADMIN — HEPARIN SODIUM 5000 UNITS: 5000 INJECTION INTRAVENOUS; SUBCUTANEOUS at 14:25

## 2024-04-30 RX ADMIN — ALBUTEROL SULFATE 2.5 MG: 2.5 SOLUTION RESPIRATORY (INHALATION) at 07:55

## 2024-04-30 RX ADMIN — HEPARIN SODIUM 5000 UNITS: 5000 INJECTION INTRAVENOUS; SUBCUTANEOUS at 05:03

## 2024-04-30 RX ADMIN — INSULIN LISPRO 4 UNITS: 100 INJECTION, SOLUTION INTRAVENOUS; SUBCUTANEOUS at 17:58

## 2024-04-30 RX ADMIN — PIPERACILLIN AND TAZOBACTAM 3.38 G: 3; .375 INJECTION, POWDER, LYOPHILIZED, FOR SOLUTION INTRAVENOUS at 14:25

## 2024-04-30 RX ADMIN — INSULIN LISPRO 2 UNITS: 100 INJECTION, SOLUTION INTRAVENOUS; SUBCUTANEOUS at 09:57

## 2024-04-30 RX ADMIN — POLYETHYLENE GLYCOL 3350 17 G: 17 POWDER, FOR SOLUTION ORAL at 20:38

## 2024-04-30 RX ADMIN — ATORVASTATIN CALCIUM 20 MG: 20 TABLET, FILM COATED ORAL at 20:29

## 2024-04-30 RX ADMIN — Medication 1 APPLICATION: at 20:29

## 2024-04-30 RX ADMIN — HYDROCODONE BITARTRATE AND ACETAMINOPHEN 1 TABLET: 5; 325 TABLET ORAL at 23:55

## 2024-04-30 RX ADMIN — MIDODRINE HYDROCHLORIDE 10 MG: 10 TABLET ORAL at 14:25

## 2024-04-30 RX ADMIN — Medication 1 CAPSULE: at 09:57

## 2024-04-30 RX ADMIN — PIPERACILLIN AND TAZOBACTAM 3.38 G: 3; .375 INJECTION, POWDER, LYOPHILIZED, FOR SOLUTION INTRAVENOUS at 06:35

## 2024-04-30 RX ADMIN — LEVOTHYROXINE SODIUM 88 MCG: 88 TABLET ORAL at 05:02

## 2024-04-30 RX ADMIN — Medication 10 ML: at 20:29

## 2024-04-30 RX ADMIN — INSULIN LISPRO 4 UNITS: 100 INJECTION, SOLUTION INTRAVENOUS; SUBCUTANEOUS at 20:29

## 2024-04-30 RX ADMIN — SENNOSIDES AND DOCUSATE SODIUM 2 TABLET: 8.6; 5 TABLET ORAL at 09:57

## 2024-04-30 RX ADMIN — HYDROCODONE BITARTRATE AND ACETAMINOPHEN 1 TABLET: 5; 325 TABLET ORAL at 04:53

## 2024-04-30 RX ADMIN — MIDODRINE HYDROCHLORIDE 10 MG: 10 TABLET ORAL at 21:09

## 2024-04-30 NOTE — CASE MANAGEMENT/SOCIAL WORK
Continued Stay Note   [place]     Patient Name: Jermaine Singh  MRN: [id number]  Today's Date: 4/30/2024    Admit Date: 4/22/2024    Plan: rehab   Discharge Plan       Row Name 04/30/24 1258       Plan    Plan rehab    Patient/Family in Agreement with Plan yes    Plan Comments I spoke w/patient and spouse in room, patient stated that he is still weak feeling. Therapy continuing to work w/patient and recommendations of rehab. Patient is agreeable. His spouse is concerned that he may need less intense therapy like skilled instead of acute @ [facility]. She mentioned [facility] in [place]. I explained that  I can send referrals to other places besides [facility], but that [facility] still following as well. I updated Sheila jenkins/Cardinal Wilkins, she will continue to follow. Will see how patient progresses. I will call [facility] to see if they have bed availability and to follow patient if they do. Will see how patient continues to do wtherapy and how he feels closer to d/c to determine rehab choice.    Final Discharge Disposition Code 30 - still a patient                   Discharge Codes    No documentation.                 Expected Discharge Date and Time       Expected Discharge Date Expected Discharge Time    May 2, 2024               Reilly Davis RN

## 2024-04-30 NOTE — PLAN OF CARE
Goal Outcome Evaluation:  Plan of Care Reviewed With: patient, spouse           Pt up to chair today. Levophed continues at low dose. Pt still have minimal appetite with little interest in eating. UOP remains low, MD aware. Patient alert and oriented through shift.

## 2024-04-30 NOTE — PLAN OF CARE
Problem: Adult Inpatient Plan of Care  Goal: Plan of Care Review  Outcome: Ongoing, Not Progressing  Flowsheets (Taken 4/30/2024 0421)  Progress: no change  Plan of Care Reviewed With: patient  Outcome Evaluation: Patient rested well tonight. Patient remains on Levo at 0.03-0.04 to maintain a MAP > 65. Patient remains on room air and oxygen remains >90%. Patient remains confused to situation tonight, but only had one period of visual hallucinations. Patient very pleasant and cooperative. Patient continues to complain of chest soreness, but PO pain medication controls pain well. Awaiting morning labs. Wife remains at bedside. Continue to monitor closely, continue with current plan of care.

## 2024-04-30 NOTE — PROGRESS NOTES
INFECTIOUS DISEASE Progress Note    Jermaine Singh  1945  [id number]    Admission Date: 4/22/2024      Requesting Provider: No ref. provider found  Evaluating Physician: Lincoln Padilla MD    Reason for Consultation:  UTI    History of present illness:    4/23/24: Patient is a 79 y.o. male  with a history of type 2 diabetes mellitus, peripheral neuropathy, stage III chronic kidney disease, bilateral DVTs, paroxysmal atrial fibrillation, sick sinus syndrome status post pacemaker placement, CHF, peripheral vascular disease, and left diabetic foot infection who is seen today for evaluation of UTI.  He had a history of UTI last month with urine cultures and early March growing yeast.  He was apparently treated with an antifungal agent.    Over the last few days he noted the onset of profound weakness and fatigue.  He also noticed some dysuria.  He was brought to the emergency room and found to have pyuria and bacteriuria with leukocytosis.   Urine culture was sent and he was started on ceftriaxone.   His white blood cell count today is 11.8.   He was noted to have a blister over his right fifth metatarsal head and a chronic right pretibial wound.    4/24/24: He remains afebrile.  His urine culture was finalized as negative.  I have asked the laboratory to hold the urine culture for yeast but they did not and it was disposed of last night.   He states that his dysuria is improved today.  Feels better.     4/25/2024:  He remains afebrile.  White blood cell count today is 6.7.  Creatinine is 1.99.   Urinalysis yesterday revealed too numerous to count white blood cells.  He denied dysuria this morning.  After I saw him he suffered a V-fib cardiac arrest and was transferred to the ICU.  He is endotracheally intubated    4/26/24:   His maximum temperature over the last 24 hours is 99.5.  Repeat urine culture from 4/24 is pending.  He remains on ventilatory support.    Creatinine is 2.28.  ALT is  1028.  White  blood cell count is 9.95.  O2 saturation is 100% on an FiO2 of 30%.  Chest x-ray reveals no pulmonary infiltrates. \    Later this morning he was extubated.  He is confused.  He knows his name and his birthdate.  He cannot answer more complex questions.    4/27/2024:  His maximum temperature over the last 24 hours is 99.5.  His left ventricular ejection fraction is less than 20%.  His white blood cell count is 2.37.  ALT is down to 626.   Chest x-ray 4/27 reveals moderate hemoglobin airspace disease.   O2 saturation is 99% on 4 L.   He is on Zosyn therapy.   Later today his oxygen demand increased and he was placed   On BiPAP.     4/28/2024:   He has remained afebrile.   White blood cell count is 10.1. His creatinine is 2.65 ALT is 450.  His oxygen demand is down to 1 liter today. Chest x-ray today reveals interval improvement in the right upper lobe infiltrate.  He is coughing and has some sputum production.  He remains confused and did not recognize his wife and daughter today      4/29/2024: He remains afebrile.  His creatinine is 2.52.  ALT is 320.  White blood cell count is 9.3.    He is now on 1 L with an O2 saturation of 96%.  He is more alert and responsive today.  He is less confused.     4/30/2024: He has remained afebrile.  Creatinine is 2.68.  ALT is 218.   White blood cell count is 9.1.  room air. room air.  His confusion is improved today.  He recognized his wife today.  He denies increased cough and sputum production.  He knows me by name today.    Past Medical History:   Diagnosis Date    Allergic     Arthritis     Asthma     Coronary artery disease     Diabetes mellitus 2000    started on inuslin 12/2018; started on po meds in 2000; checking blood sugars daily     Diabetic foot infection 07/23/2023    Disease of thyroid gland     po meds daily for hypothyroidism     Elevated serum creatinine 11/15/2022    Elevated troponin 10/02/2022    Generalized weakness 11/15/2022    History of fracture as  a child     rt leg- severe     Hyperlipidemia     Hypertension     Hypothyroidism     PAF (paroxysmal atrial fibrillation) 07/23/2023    Peripheral neuropathy     Peripheral vascular disease     s/p angiogram 2/19-needs stent in left leg     Pleural effusion, bilateral 11/15/2022    Proximal phalanx fracture of the second digit extending into the second metatarsal joint 07/28/2022    RBBB     Right knee pain     Status post amputation of great toe and second toe of left foot 07/23/2023    Status post amputation of great toe, left 11/15/2022    Unstable angina 02/12/2019    Added automatically from request for surgery [id number]    Vitamin D deficiency        Past Surgical History:   Procedure Laterality Date    AMPUTATION DIGIT Left 01/17/2023    Procedure: AMPUTATION DIGIT LEFT;  Surgeon: Gopal Márquez MD;  Location:  [facility];  Service: Vascular;  Laterality: Left;    APPENDECTOMY      CARDIAC CATHETERIZATION N/A 02/14/2019    Procedure: Left Heart Cath;  Surgeon: Cooper Apodaca MD;  Location:  [facility] CATH INVASIVE LOCATION;  Service: Cardiology    CARDIAC ELECTROPHYSIOLOGY PROCEDURE N/A 05/18/2022    Procedure: DEVICE IMPLANT;  Surgeon: Cooper Apodaca MD;  Location:  [facility] CATH INVASIVE LOCATION;  Service: Cardiology;  Laterality: N/A;    CARDIAC SURGERY      COLONOSCOPY      CORONARY ARTERY BYPASS GRAFT N/A 03/22/2019    Procedure: MEDIAN STERNOTOMY, CORONARY ARTERY BYPASS GRAFT X3, UTILIZING THE LEFT INTERNAL MAMMARY ARTERY, EVH AND OPEN HARVEST OF THE RIGHT GREATER SAPHENOUS VEIN, EXPLORATION OF THE LEFT LEG;  Surgeon: Ap Au MD;  Location:  [facility];  Service: Cardiothoracic    EYE SURGERY Bilateral     cataracts     FRACTURE SURGERY      INTERVENTIONAL RADIOLOGY PROCEDURE N/A 07/29/2021    Procedure: Abdominal Aortagram with Runoff;  Surgeon: Jermaine Hernandez MD;  Location:  [facility] CATH INVASIVE LOCATION;  Service: Cardiovascular;  Laterality: N/A;    KNEE ARTHROSCOPY      right x  2, left x 1    LACERATION REPAIR      right leg    LEG SURGERY      2 for fracture of rt leg     TONSILLECTOMY      Adnoidectomy    TRANS METATARSAL AMPUTATION Left 08/02/2022    Procedure: GREAT TOE AMPUTATION LEFT;  Surgeon: Gopal Márquez MD;  Location: [facility];  Service: Vascular;  Laterality: Left;       Family History   Problem Relation Age of Onset    Coronary artery disease Mother     Diabetes Mother     Hyperlipidemia Mother     Cancer Father        Social History     Socioeconomic History    Marital status:     Number of children: 2   Tobacco Use    Smoking status: Never     Passive exposure: Past    Smokeless tobacco: Never   Vaping Use    Vaping status: Never Used   Substance and Sexual Activity    Alcohol use: Not Currently     Comment: every 2-3 months    Drug use: No    Sexual activity: Not Currently     Partners: Female       Allergies   Allergen Reactions    Vancomycin Other (See Comments)     Kidney failure per last admission         Medication:    Current Facility-Administered Medications:     acetaminophen (TYLENOL) tablet 650 mg, 650 mg, Oral, Q6H PRN, Maricel Peraza, APRN, 650 mg at 04/25/24 0940    acetylcysteine (MUCOMYST) 20 % nebulizer solution 3 mL, 3 mL, Nebulization, BID - RT, Nathalia Mckeon DNP, APRN, 3 mL at 04/29/24 2004    albuterol (PROVENTIL) nebulizer solution 0.083% 2.5 mg/3mL, 2.5 mg, Nebulization, BID PRN, Nathalia Mckeon DNP, APRN, 2.5 mg at 04/29/24 2004    [Held by provider] apixaban (ELIQUIS) tablet 5 mg, 5 mg, Oral, Q12H, Maricel Peraza APRN, 5 mg at 04/25/24 0840    aspirin chewable tablet 81 mg, 81 mg, Oral, Daily, Maricel Peraza APRN, 81 mg at 04/29/24 0802    atorvastatin (LIPITOR) tablet 20 mg, 20 mg, Oral, Nightly, Golden Madison MD, 20 mg at 04/29/24 2024    azelastine (ASTELIN) nasal spray 2 spray, 2 spray, Each Nare, BID, Maricel Peraza, APRN, 2 spray at 04/29/24 0805    sennosides-docusate (PERICOLACE) 8.6-50 MG per tablet  2 tablet, 2 tablet, Oral, BID, 2 tablet at 04/29/24 2024 **AND** polyethylene glycol (MIRALAX) packet 17 g, 17 g, Oral, Daily PRN **AND** bisacodyl (DULCOLAX) EC tablet 5 mg, 5 mg, Oral, Daily PRN **AND** bisacodyl (DULCOLAX) suppository 10 mg, 10 mg, Rectal, Daily PRN, Antonio Angeles MD    castor oil-balsam peru (VENELEX) ointment 1 Application, 1 Application, Topical, Q12H, Eyad Ledesma MD, 1 Application at 04/29/24 2023    dextrose (D50W) (25 g/50 mL) IV injection 25 g, 25 g, Intravenous, Q15 Min PRN, Maricel Peraza APRN    glucagon (GLUCAGEN) injection 1 mg, 1 mg, Intramuscular, Q15 Min PRN, Maricel Peraza APRN    heparin (porcine) 5000 UNIT/ML injection 5,000 Units, 5,000 Units, Subcutaneous, Q8H, Antonio Angeles MD, 5,000 Units at 04/30/24 0503    HYDROcodone-acetaminophen (NORCO) 5-325 MG per tablet 1 tablet, 1 tablet, Oral, Q4H PRN, Hailee Shukla DO, 1 tablet at 04/30/24 0453    insulin glargine (LANTUS, SEMGLEE) injection 8 Units, 8 Units, Subcutaneous, Nightly, Luz Elena Batista, DO, 8 Units at 04/29/24 2024    Insulin Lispro (humaLOG) injection 2-9 Units, 2-9 Units, Subcutaneous, 4x Daily AC & at Bedtime, Antonio Angeles MD, 2 Units at 04/29/24 2112    lactobacillus acidophilus (RISAQUAD) capsule 1 capsule, 1 capsule, Oral, Daily, Maricel Peraza APRN, 1 capsule at 04/29/24 0802    levothyroxine (SYNTHROID, LEVOTHROID) tablet 88 mcg, 88 mcg, Oral, Q AM, Maricel Peraza APRN, 88 mcg at 04/30/24 0502    Magnesium Low Dose Replacement - Follow Nurse / BPA Driven Protocol, , Does not apply, PRN, Maricel Peraza, APRN    midodrine (PROAMATINE) tablet 10 mg, 10 mg, Oral, TID AC, Golden Madison MD, 10 mg at 04/30/24 0502    nitroglycerin (NITROSTAT) SL tablet 0.4 mg, 0.4 mg, Sublingual, Q5 Min PRN, Maricel Peraza APRN    norepinephrine (LEVOPHED) 8 mg in 250 mL NS infusion (premix), 0.02-0.3 mcg/kg/min, Intravenous, Titrated, Eyad Ledesma MD,  Last Rate: 4.37 mL/hr at 24 0450, 0.03 mcg/kg/min at 24 0450    pantoprazole (PROTONIX) EC tablet 40 mg, 40 mg, Oral, Q AM, Antonio Angeles MD, 40 mg at 24 0502    piperacillin-tazobactam (ZOSYN) 3.375 g IVPB in 100 mL NS MBP (CD), 3.375 g, Intravenous, Q8H, Lincoln Padilla MD, 3.375 g at 24 0635    polyethylene glycol (MIRALAX) packet 17 g, 17 g, Oral, Daily, Artur Avila MD, 17 g at 24 0802    prochlorperazine (COMPAZINE) injection 5 mg, 5 mg, Intravenous, Q6H PRN, Maricel Peraza, APRN, 2.5 mg at 24 1316    sodium chloride 0.9 % flush 10 mL, 10 mL, Intravenous, PRN, Case, Hailee V., DO    sodium chloride 0.9 % flush 10 mL, 10 mL, Intravenous, Q12H, Case, Hailee V., DO, 10 mL at 24 2024    tamsulosin (FLOMAX) 24 hr capsule 0.4 mg, 0.4 mg, Oral, Daily, Maricel Peraza, APRN, 0.4 mg at 24 0805    Antibiotics:  Anti-Infectives (From admission, onward)      Ordered     Dose/Rate Route Frequency Start Stop    24 1356  piperacillin-tazobactam (ZOSYN) 3.375 g IVPB in 100 mL NS MBP (CD)        Ordering Provider: Lincoln Padilla MD    3.375 g  over 4 Hours Intravenous Every 8 Hours 24 2200 24 2159    24 1356  piperacillin-tazobactam (ZOSYN) 3.375 g IVPB in 100 mL NS MBP (CD)        Ordering Provider: Eyad Ledesma MD    3.375 g  over 30 Minutes Intravenous Once 24 1445 24 1557              Review of Systems:  See HPI      Physical Exam:   Vital Signs  Temp (24hrs), Av.6 °F (36.4 °C), Min:97 °F (36.1 °C), Max:97.9 °F (36.6 °C)    Temp  Min: 97 °F (36.1 °C)  Max: 97.9 °F (36.6 °C)  BP  Min: 76/51  Max: 125/109  Pulse  Min: 59  Max: 71  Resp  Min: 16  Max: 20  SpO2  Min: 92 %  Max: 100 %    GENERAL:   Alert and responsive.  HEENT: Normocephalic, atraumatic.  PERRL. EOMI. No conjunctival injection. No icterus.  No labial ulcers  NECK: Supple   HEART:  irregular rate   LUNGS:    He has mild right greater  "than left rhonchi  ABDOMEN: Soft, nontender, nondistended. No rebound or guarding. NO mass or HSM.  EXT:   his right distal leg is in an  Unna boot.  Photos of his wound from 4/29 am reveal decrease in erythema of the pretibial wound  :   Fonseca catheter in place.  MSK: No joint effusions or erythema  SKIN: Warm and dry without cutaneous eruptions on Inspection/palpation.    NEURO:    He is alert and responsive.  His confusion has abated.  He moves all of his extremities.    Laboratory Data    Results from last 7 days   Lab Units 04/30/24  0447 04/29/24  0455 04/28/24  0405   WBC 10*3/mm3 9.12 9.28 10.11   HEMOGLOBIN g/dL 8.8* 7.9* 8.0*   HEMATOCRIT % 26.7* 23.8* 23.5*   PLATELETS 10*3/mm3 232 205 183     Results from last 7 days   Lab Units 04/30/24  0447   SODIUM mmol/L 140   POTASSIUM mmol/L 4.3   CHLORIDE mmol/L 110*   CO2 mmol/L 17.0*   BUN mg/dL 82*   CREATININE mg/dL 2.68*   GLUCOSE mg/dL 162*   CALCIUM mg/dL 7.8*     Results from last 7 days   Lab Units 04/30/24  0447   ALK PHOS U/L 85   BILIRUBIN mg/dL 0.6   ALT (SGPT) U/L 218*   AST (SGOT) U/L 22                         Estimated Creatinine Clearance: 27.8 mL/min (A) (by C-G formula based on SCr of 2.68 mg/dL (H)).      Microbiology:  No results found for: \"ACANTHNAEG\", \"AFBCX\", \"BPERTUSSISCX\", \"BLOODCX\"  No results found for: \"BCIDPCR\", \"CXREFLEX\", \"CSFCX\", \"CULTURETIS\"  No results found for: \"CULTURES\", \"HSVCX\", \"URCX\"  No results found for: \"EYECULTURE\", \"GCCX\", \"HSVCULTURE\", \"LABHSV\"  No results found for: \"LEGIONELLA\", \"MRSACX\", \"MUMPSCX\", \"MYCOPLASCX\"  No results found for: \"NOCARDIACX\", \"STOOLCX\"  Urine Culture   Date Value Ref Range Status   04/22/2024 No growth  Preliminary     No results found for: \"VIRALCULTU\", \"WOUNDCX\"        Radiology:  Imaging Results (Last 72 Hours)       Procedure Component Value Units Date/Time    XR Chest 1 View [[id number]] Collected: 04/28/24 1831     Updated: 04/28/24 1834    Narrative:      XR CHEST 1 VW    Date of " Exam: 4/28/2024 6:04 PM EDT    Indication: picc placement    Comparison: Same day radiographs    Findings:  Left chest wall cardiac device with distal lead tips in unchanged position. Interval placement of a right approach central venous catheter with distal tip overlying the superior vena cava. Unchanged cardiomediastinal silhouette with cardiac surgical   changes. No new focal airspace consolidation. No pleural effusion or pneumothorax. Osseous structures are unchanged.      Impression:      Impression:  Interval placement of right approach central venous catheter with distal tip overlying the superior vena cava. No pneumothorax.      Electronically Signed: Navneet Crisostomo MD    4/28/2024 6:31 PM EDT    Workstation ID: [id number]    [facility] FEES - Fiberoptic Endo Eval Swallow [[id number]] Resulted: 04/28/24 1401     Updated: 04/28/24 1401    Narrative:      This procedure was auto-finalized with no dictation required.    XR Chest 1 View [[id number]] Collected: 04/28/24 1013     Updated: 04/28/24 1019    Narrative:      XR CHEST 1 VW    Date of Exam: 4/28/2024 4:30 AM EDT    Indication: Hypoxia, aspiration    Comparison: 4/27/2024    Findings:  Heart size and pulmonary vasculature are stable. There is been resolution of right upper lobe airspace disease. The lungs are clear other than mild atelectasis in the lung bases. Costophrenic angles are sharp      Impression:      Impression:    1. Interval improvement in right upper lobe airspace disease.    2. Bibasilar atelectasis      Electronically Signed: Miguel Nava    4/28/2024 10:16 AM EDT    Workstation ID: [id number]          I read his chest x-ray of 4/28      Impression:    1.    Right upper lobe aspiration pneumonia-he is now on Zosyn therapy.  He is clinically improving. We should be able to discontinue the Zosyn soon.   2.   V-fib arrest-status post resuscitation.  He is on amiodarone   3.  Right fifth metatarsal wound/bullous lesion-without evidence of cellulitis   4.   Chronic right pretibial dermatitis-without evidence of cellulitis  5.  Type 2 diabetes mellitus  6.  coronary artery disease/status post CABG, 3/22/2019  7.  Severe systolic heart failure-systolic function is severely impaired with an ejection fraction of less than 20%.  8.  Stage IIIb chronic kidney disease  9.  Paroxysmal atrial fibrillation  10.  Acute hypoxic respiratory failure- improved today with the decreased oxygen demand.  11.  Ischemic hepatitis-this continues to improve with decreased transaminitis.  12.  Pyuria- with a negative urine culture.  13.  Anoxic encephalopathy- this is slowly improving    PLAN/RECOMMENDATIONS:   1.    Continue with a right leg Unna boot  2.  Continue intravenous Zosyn-I will plan to stop his Zosyn soon      I discussed his complex situation with his wife today.      Lincoln Padilla MD  4/30/2024  06:55 EDT

## 2024-04-30 NOTE — DISCHARGE PLACEMENT REQUEST
"Jermaine Dawn \"Adi\" (79 y.o. Male) From Reilly Davis RN  [id number]      Date of Birth   1945    Social Security Number       Address   [address] [facility] [address]    Home Phone   [phone]    MRN   [id number]       Scientology   None    Marital Status                               Admission Date   4/22/24    Admission Type   Emergency    Admitting Provider   Jose Ramon Murphy MD    Attending Provider   Eyad Ledesma MD    Department, Room/Bed   [facility] 2A ICU, N205/1       Discharge Date       Discharge Disposition       Discharge Destination                                 Attending Provider: Eyad Ledesma MD    Allergies: Vancomycin    Isolation: None   Infection: None   Code Status: No CPR    Ht: 190 cm (74.8\")   Wt: 87.9 kg (193 lb 12.6 oz)    Admission Cmt: None   Principal Problem: Cardiac arrest [I46.9]                   Active Insurance as of 4/22/2024       Primary Coverage       Payor Plan Insurance Group Employer/Plan Group    MEDICARE MEDICARE A & B        Payor Plan Address Payor Plan Phone Number Payor Plan Fax Number Effective Dates    [address] [phone]  3/1/2010 - None Entered    [facility] [id number]         Subscriber Name Subscriber Birth Date Member ID       JERMAINE DAWN 1945 [id number]               Secondary Coverage       Payor Plan Insurance Group Employer/Plan Group    HUMANA HUMANA  SUP              [id number]       Payor Plan Address Payor Plan Phone Number Payor Plan Fax Number Effective Dates    [address] [address]   8/1/2020 - None Entered    [facility] [address]         Subscriber Name Subscriber Birth Date Member ID       JERMAINE DAWN 1945 [id number]                     Emergency Contacts        (Rel.) Home Phone Work Phone Mobile Phone    NILES DAWNLENE (Spouse) [phone] -- [phone]    JUSTHOR GUERRA (Daughter) [phone] -- [phone]                 History & Physical    " "    Artur Avila MD at 24 1906              [facility]  HISTORY AND PHYSICAL    Patient Name: Jermaine Singh  : 1945  MRN: [id number]  Primary Care Physician: Marysol Shrestha MD  Date of admission: 2024    Subjective  Subjective     Chief Complaint:  Generalized weakness    HPI:  Jermaine Singh is a 79 y.o. male with a history of CAD, DM, HTN, HLD, PAF, PVD, RBBB, Asthma, weakness, Hypothyroidism, CKD, presents to the ED with complaints of generalized weakness.  Patient has a chronic wound on his RLE and is followed by home health.  The wound on his RLE is being treated with silver and covered with a dressing.  Wife reports that his wound had been improving but today he has had increased serous drainage.  Wife also reports that today she noticed a \"blister\" on the bottom of his right foot.  Patient has had difficulty urinating today and required in and out cath in the ED for retention.  Patient complains of nausea and vomiting this afternoon, dizziness for the past 2 days, fatigue, dysuria, and lower abdominal pain that he attributes to his hernia.  At baseline patient has a lift chair and uses a walker at home.  Today he was unable to stand even with the lift chair due to weakness and called EMS for assistance.  No fevers, shortness of air, chest pain, diarrhea, melena, difficulty with speech, focal weakness, or any other complaints at this time.  UA consistent with UTI.  Chest x-ray shows no active pulmonary process.  X-ray right foot shows large amount of soft tissue swelling with no definite subcutaneous emphysema or destructive bony lesion.  Patient was started on Rocephin in the ED, and is being admitted to the hospitalist for further evaluation and management.        Review of Systems   Constitutional:  Positive for fatigue. Negative for appetite change, chills and fever.   Eyes: Negative.    Respiratory: Negative.     Cardiovascular:  " Positive for leg swelling. Negative for chest pain and palpitations.   Gastrointestinal:  Positive for abdominal pain, constipation, diarrhea, nausea and vomiting. Negative for abdominal distention.   Endocrine: Negative.    Genitourinary:  Positive for difficulty urinating and dysuria. Negative for frequency and urgency.   Musculoskeletal: Negative.    Skin:  Positive for wound.   Allergic/Immunologic: Negative.    Neurological:  Positive for dizziness, weakness, light-headedness and headaches. Negative for syncope, facial asymmetry and speech difficulty.   Hematological: Negative.    Psychiatric/Behavioral: Negative.                  Personal History     Past Medical History:   Diagnosis Date    Allergic     Arthritis     Asthma     Coronary artery disease     Diabetes mellitus 2000    started on inuslin 12/2018; started on po meds in 2000; checking blood sugars daily     Disease of thyroid gland     po meds daily for hypothyroidism     Elevated serum creatinine 11/15/2022    Elevated troponin 10/02/2022    Generalized weakness 11/15/2022    History of fracture as a child     rt leg- severe     Hyperlipidemia     Hypertension     Hypothyroidism     PAF (paroxysmal atrial fibrillation) 07/23/2023    Peripheral neuropathy     Peripheral vascular disease     s/p angiogram 2/19-needs stent in left leg     Pleural effusion, bilateral 11/15/2022    Proximal phalanx fracture of the second digit extending into the second metatarsal joint 07/28/2022    RBBB     Right knee pain     Unstable angina 02/12/2019    Added automatically from request for surgery [id number]    Vitamin D deficiency              Past Surgical History:   Procedure Laterality Date    AMPUTATION DIGIT Left 01/17/2023    Procedure: AMPUTATION DIGIT LEFT;  Surgeon: Gopal Márquez MD;  Location: [facility];  Service: Vascular;  Laterality: Left;    APPENDECTOMY      CARDIAC CATHETERIZATION N/A 02/14/2019    Procedure: Left Heart Cath;  Surgeon: Paulie  Cooper TRACEY MD;  Location:  [facility] CATH INVASIVE LOCATION;  Service: Cardiology    CARDIAC ELECTROPHYSIOLOGY PROCEDURE N/A 05/18/2022    Procedure: DEVICE IMPLANT;  Surgeon: Cooper Apodaca MD;  Location:  [facility] CATH INVASIVE LOCATION;  Service: Cardiology;  Laterality: N/A;    CARDIAC SURGERY      COLONOSCOPY      CORONARY ARTERY BYPASS GRAFT N/A 03/22/2019    Procedure: MEDIAN STERNOTOMY, CORONARY ARTERY BYPASS GRAFT X3, UTILIZING THE LEFT INTERNAL MAMMARY ARTERY, EVH AND OPEN HARVEST OF THE RIGHT GREATER SAPHENOUS VEIN, EXPLORATION OF THE LEFT LEG;  Surgeon: Ap Au MD;  Location:  [facility] OR;  Service: Cardiothoracic    EYE SURGERY Bilateral     cataracts     FRACTURE SURGERY      INTERVENTIONAL RADIOLOGY PROCEDURE N/A 07/29/2021    Procedure: Abdominal Aortagram with Runoff;  Surgeon: Jermaine Hernandez MD;  Location:  [facility]ATH INVASIVE LOCATION;  Service: Cardiovascular;  Laterality: N/A;    KNEE ARTHROSCOPY      right x 2, left x 1    LACERATION REPAIR      right leg    LEG SURGERY      2 for fracture of rt leg     TONSILLECTOMY      Adnoidectomy    TRANS METATARSAL AMPUTATION Left 08/02/2022    Procedure: GREAT TOE AMPUTATION LEFT;  Surgeon: Gopal Márquez MD;  Location:  [facility];  Service: Vascular;  Laterality: Left;       Family History:  family history includes Cancer in his father; Coronary artery disease in his mother; Diabetes in his mother; Hyperlipidemia in his mother.     Social History:  reports that he has never smoked. He has been exposed to tobacco smoke. He has never used smokeless tobacco. He reports that he does not currently use alcohol. He reports that he does not use drugs.  Social History     Social History Narrative    Lives in [place].       Medications:  Align, Cholecalciferol, Diclofenac Sodium, Hernia Support Right Medium, Hydrocortisone (Perianal), Insulin Lispro, Lifitegrast, Rollator Ultra-Light, Vibegron, acetaminophen, apixaban, aspirin, atorvastatin,  azelastine, bumetanide, carvedilol, famotidine, fluconazole, fluocinonide, glucose blood, insulin detemir, isosorbide mononitrate, levothyroxine, linaclotide, losartan, metoprolol succinate XL, mupirocin, nitroglycerin, and nystatin    Allergies   Allergen Reactions    Vancomycin Other (See Comments)     Kidney failure per last admission       Objective  Objective     Vital Signs:   Temp:  [97.5 °F (36.4 °C)] 97.5 °F (36.4 °C)  Heart Rate:  [84-86] 86  Resp:  [16] 16  BP: (107-130)/(70-74) 110/72    Physical Exam   Constitutional: Awake, alert, wife at bedside, appears chronically ill  Eyes: PERRLA, sclerae anicteric, no conjunctival injection  HENT: NCAT, mucous membranes moist  Neck: Supple, no thyromegaly, no lymphadenopathy, trachea midline  Respiratory: Clear to auscultation bilaterally, nonlabored respirations   Cardiovascular: RRR, no murmurs, rubs, or gallops, palpable pedal pulses bilaterally  Gastrointestinal: Positive bowel sounds, soft, mild RLQ tenderness, nondistended  Musculoskeletal: 2+ BLE edema, no clubbing or cyanosis to extremities  Psychiatric: Appropriate affect, cooperative  Neurologic: Oriented x 3, strength symmetric in all extremities, Cranial Nerves grossly intact to confrontation, speech clear  Skin: Erythematous wound distal anterior right shin area with serous drainage on dressing.  Blister distal aspect of fifth metatarsal.       Result Review:  I have personally reviewed the results from the time of this admission to 4/22/2024 23:29 EDT and agree with these findings:  [x]  Laboratory list / accordion  []  Microbiology  [x]  Radiology  []  EKG/Telemetry   []  Cardiology/Vascular   []  Pathology  [x]  Old records  []  Other:  Most notable findings include: Hgn 10.1, Hct 33.5, K 5.3, BUN 77, creatinine 1.7    LAB RESULTS:      Lab 04/22/24  1506   WBC 10.31   HEMOGLOBIN 10.1*   HEMATOCRIT 33.5*   PLATELETS 108*   NEUTROS ABS 8.74*   IMMATURE GRANS (ABS) 0.05   LYMPHS ABS 0.38*   MONOS  ABS 0.95*   EOS ABS 0.16   MCV 99.4*         Lab 04/22/24  1713 04/22/24  1506   SODIUM  --  137   POTASSIUM  --  5.3*   CHLORIDE  --  108*   CO2  --  17.0*   ANION GAP  --  12.0   BUN  --  77*   CREATININE  --  1.70*   EGFR  --  40.5*   GLUCOSE  --  264*   CALCIUM  --  8.8   MAGNESIUM  --  2.2   TSH 1.940  --          Lab 04/22/24  1506   TOTAL PROTEIN 6.4   ALBUMIN 3.7   GLOBULIN 2.7   ALT (SGPT) 26   AST (SGOT) 18   BILIRUBIN 0.4   ALK PHOS 80         Lab 04/22/24  1713 04/22/24  1506   HSTROP T 119* 123*             Lab 04/22/24  1506   IRON 22*  22*   IRON SATURATION (TSAT) 7*   TIBC 325   TRANSFERRIN 218   FERRITIN 309.80         Brief Urine Lab Results  (Last result in the past 365 days)        Color   Clarity   Blood   Leuk Est   Nitrite   Protein   CREAT   Urine HCG        04/22/24 1554 Yellow   Clear   Moderate (2+)   Small (1+)   Negative   30 mg/dL (1+)                 Microbiology Results (last 10 days)       ** No results found for the last 240 hours. **            XR Foot 3+ View Right    Result Date: 4/22/2024  XR FOOT 3+ VW RIGHT Date of Exam: 4/22/2024 6:54 PM EDT Indication: Diabetic foot wound. Comparison: No comparison. Findings: No definite acute osseous abnormality is noted. There is diffuse soft tissue swelling overlying the foot. Mild irregularity noted along the distal aspect of the great toe. No subcutaneous emphysema noted. Joint spaces demonstrate diffuse mild degenerative change. Soft tissue swelling is more prominent along the dorsum of the foot. Peripheral vascular calcifications are noted     Impression: Impression: Large amount of soft tissue swelling with no definite subcutaneous emphysema or destructive bone lesion. Recommend follow-up as clinically indicated Electronically Signed: Jamin Aguilar MD  4/22/2024 7:31 PM EDT  Workstation ID: [id number]    XR Chest 1 View    Result Date: 4/22/2024  XR CHEST 1 VW Date of Exam: 4/22/2024 2:49 PM EDT Indication: Weak/Dizzy/AMS triage  protocol Comparison: Chest radiograph 3/9/2024 Findings: Stable cardiomegaly. Dual-chamber AICD. Median sternotomy and prior CABG. Platelike areas of atelectasis versus scar. No infectious appearing consolidation, edema, large effusion or pneumothorax. Lung volumes are borderline low. Degenerative related osseous changes.     Impression: Impression: No active pulmonary process. Stable radiographic appearance of the chest since 3/9/2024. Electronically Signed: Fran Reis MD  4/22/2024 3:15 PM EDT  Workstation ID: [id number]     Results for orders placed during the hospital encounter of 07/27/22    Adult Transthoracic Echo Complete W/ Cont if Necessary Per Protocol    Interpretation Summary  · Left ventricular ejection fraction appears to be 46 - 50%. Left ventricular systolic function is low normal.  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 Au    Chronic HFrEF (heart failure with reduced ejection fraction)    Anemia, chronic disease    Stage 3b chronic kidney disease    PAF (paroxysmal atrial fibrillation)    Jermaine Singh is a 79 y.o. male with a history of CAD, DM, HTN, HLD, PAF, PVD, RBBB, Asthma, weakness, Hypothyroidism, CKD, presents to the ED with complaints of generalized weakness.      Assessment and Plan:    Acute UTI (POA)  -- UA: Blood moderate, leukocytes small, protein 30, RBC 11-20, WBC 21-50, bacteria 2+  -Rocephin empiric antibiotics; follow urine culture      Urinary retention  BPH  Bladder wall thickening--cystoscopy on 3/22/2024 showed scattered areas of flat erythema most notable on the right lateral wall.  3-4+ trabeculation.  -- Acute urinary retention protocol  -- Flomax  -- Follows with Dr. Soto with urology    Generalized weakness  --  Fall precautions  -- PT/OT consult  -- Consult case management    Chronic wound RLE  Blister right foot  --X-ray right foot shows large amount of soft tissue swelling with no definite subcutaneous emphysema or destructive bone lesion  -- Consult WOC in the a.m.    T2DM  -- A1c in the a.m.  -- lantus 5 units sq nightly, SSI (titrate prn)    Elevated troponin--chronically elevated (chronically elevate trop >100)  CAD s/p CABG  Parox afib/SSS/pacemaker  HTN  HLD  PVD  RBBB  Chronic HFrEF (EF 42% previous stress test)  -- Chest x-ray shows no active pulmonary process  -- Troponin 123 and 119, delta -4  -- Patient denies chest pain  -- Strict I's and O's  -- Daily weights  -- Continue oral Bumex 1 mg twice daily  -continue eliquis & coreg  -- Aspirin, atorvastatin, Coreg, Imdur, losartan      CKD 3 (baseline cr ~1.6-2.1)  Hyperkalemia  -- Creatinine 1.7, potassium 5.3  --low potassium diet  -lokelma x 1  --recheck potassium level in a.m. (if rising stop ARB and consider more aggressive iv insulin, etc)    Asthma  -currently stable, not in exacerbation  -- Chest x-ray shows no active pulmonary process  -- stable    Anemia  Thrombocytopenia  Iron def  -- Hemoglobin 10.1, hematocrit 33.5, platelets 108  -- No overt signs of bleeding; denies melena or brbpr  -iron level low; start iv iron day #1/3  -b12/folate pending  -monitor/trend hgb and plts    Hypothyroidism  --TSH 1.94; Continue levothyroxine      Am labs: cbc, bmp, A1c (f/u b12/folate, trend hgb, platelet levels, trend potassium)      DVT prophylaxis:  Eliquis    CODE STATUS:    Level Of Support Discussed With: Patient  Code Status (Patient has no pulse and is not breathing): CPR (Attempt to Resuscitate)  Medical Interventions (Patient has pulse or is breathing): Full Support      Expected Discharge  TBD  Expected discharge date/ time has not been documented.      This note has been completed as part of a split-shared workflow.     Signature: Electronically signed by  Maricel Peraza, APRN, 04/22/24, 8:24 PM EDT.           Attending   Admission Attestation       I have performed an independent face-to-face diagnostic evaluation including performing an independent physical examination.  I approve of the documented plan of care above that was reviewed and developed with the advanced practice clinician (APC) and take responsibility for that plan along with its associated risks.  I have updated the HPI as appropriate.    Brief HPI    Jermaine Singh is a 80 yo m w/ hx cad (remote cabg), parox afib/sss/pacemaker, HFrEF, DM2, PAD, previous toe amputations, chronic right foot wound, ckd 3 (baseline cr ~1.6-2.1), chronically elevated troponins, hypothyroidism. Patient presents w/ profound worsening generalized weakness and recent dysuria. Workup revealed wbc 10,310, hgb 10, plts 108,000, iron level 22, iron sat 7%, creatinine 1.7, potassium 5.3 (w/ slight hemolysis), lft's normal. U/a (straight cath) showed LE +, 11-20 rbc, 21-50 wbc, 2+ bacteria. Troponin was 123, repeat 119. Cxr negative for acute process, right foot xray soft tissue edema but no emphysema or destructive lesion noted. Initiated on rocephin and admitted to hospitalist service.     Attending Physical Exam:  Temp:  [97.5 °F (36.4 °C)] 97.5 °F (36.4 °C)  Heart Rate:  [84-86] 86  Resp:  [16] 16  BP: (107-130)/(70-74) 110/72    Constitutional:Alert, oriented x 3,appears fatigued; elderly, frail but nontoxic appearing, normal work of breathing at rest  Psych:Normal/appropriate affect  HEENT:NCAT, oropharynx clear  Neck: neck supple, full range of motion  Neuro: Face symmetric, speech clear, equal , moves all extremities  Cardiac: rrr; BLE 1+ pitting edema  Resp: CTAB, normal effort  GI: abd soft, nontender  Skin: right foot lateral plantal surface w/ blistering but no overt purulent drainage, no tunneling lesion            Result Review:  I have personally reviewed the results from the time of this admission to  "4/22/2024 23:29 EDT and agree with these findings:  [x]  Laboratory list / accordion  []  Microbiology  [x]  Radiology  [x]  EKG/Telemetry   []  Cardiology/Vascular   []  Pathology  [x]  Old records  []  Other:  Most notable findings include: see hpi and above    Assessment and Plan:    See assessment and plan documented by APC above and updated/edited by me as appropriate.    Artur Avila MD  04/22/24                     Electronically signed by Artur Avila MD at 04/22/24 4415          Operative/Procedure Notes (most recent note)        Kemi Castellano APRN at 04/25/24 1611        Procedure Orders    1. Insert Central Line At Bedside [[id number]] ordered by Kemi Castellano APRN               Post-procedure Diagnoses    1. Cardiac arrest [I46.9]                 Insert Central Line At Bedside    Date/Time: 4/25/2024 4:11 PM    Performed by: Kemi Castellano APRN  Authorized by: Kemi Castellano APRN  Consent: The procedure was performed in an emergent situation. Verbal consent obtained.  Risks and benefits: risks, benefits and alternatives were discussed  Consent given by: spouse  Patient identity confirmed: arm band and hospital-assigned identification number  Time out: Immediately prior to procedure a \"time out\" was called to verify the correct patient, procedure, equipment, support staff and site/side marked as required.  Indications: vascular access and central pressure monitoring  Anesthesia: local infiltration    Anesthesia:  Local Anesthetic: lidocaine 1% without epinephrine  Anesthetic total: 3 mL    Sedation:  Patient sedated: yes  Sedatives: see MAR for details  Analgesia: see MAR for details  Vitals: Vital signs were monitored during sedation.    Preparation: skin prepped with ChloraPrep  Skin prep agent dried: skin prep agent completely dried prior to procedure  Sterile barriers: all five maximum sterile barriers used - cap, mask, sterile gown, sterile gloves, and large " sterile sheet  Hand hygiene: hand hygiene performed prior to central venous catheter insertion  Location details: left femoral  Patient position: flat  Catheter type: triple lumen  Catheter size: 7 Fr  Ultrasound guidance: yes  Sterile ultrasound techniques: sterile gel and sterile probe covers were used  Number of attempts: 1  Successful placement: yes  Post-procedure: line sutured and dressing applied  Assessment: blood return through all ports, free fluid flow, placement verified by x-ray and no pneumothorax on x-ray  Patient tolerance: patient tolerated the procedure well with no immediate complications  Comments: Dark red, non-pulsatile blood upon puncture of  vein. Guidewire visualized in left femoral vein via ultrasound. Dark red non-pulsatile blood aspirated from all three ports and flushed back well. Sutures x2. Sterile dressing applied.    Procedure was performed entirely under the direct supervision of Dr. Callie SOTO. Gray, MSN, APRN, [facility]  Pulmonary and Critical Care Medicine            Electronically signed by Kemi Castellano APRN at 04/25/24 1613          Physician Progress Notes (most recent note)        Antonio Angeles MD at 04/30/24 1206            Intensive Care Follow-up     Hospital:  LOS: 7 days   Mr. Jermaine Singh, 79 y.o. male is followed for:   Cardiac arrest     Subjective       History of present illness:    78yo M with a history of T2DM, Stage 3 CKD, bilateral DVTs, paroxysmal Afib, SSS s/p post-pacemaker placement, CHF, PVD, and left diabetic foot infection who presented to [facility] on 4/22/24 with weakness and a chronic wound on his RLE. UA was consistent with a UTI. He was started on Rocephin and admitted to Hospital Medicine.      On 4/25/24, he suffered a Vfib arrest and was intubated during CPR. He was transferred to the ICU. He was able to be extubated on 4/26/24. After extubation, his respiratory status was tenuous and family decided to make him a DNR/DNI.   Patient was seen by infectious disease and on antibiotics.  Also has been requiring norepinephrine at low-dose.  Midodrine has been started.  Patient also developed shock liver which seems to be recovering.    Subjective   Interval History:  Overnight no acute issues.  Seen sitting out in chair today.  Denies any chest pain or shortness of breath.  Remains on low-dose norepinephrine at 0.02 mics.  More conversant today.  Cortisol level checked and does not suggest adrenal insufficiency.  Plan is for repeat echocardiogram today             The patient's past medical, surgical and social history were reviewed and updated in Epic as appropriate.    Objective   Objective     Infusions:  norepinephrine, 0.02-0.3 mcg/kg/min, Last Rate: 0.03 mcg/kg/min (04/30/24 1038)      Medications:  acetylcysteine, 3 mL, Nebulization, BID - RT  [Held by provider] apixaban, 5 mg, Oral, Q12H  aspirin, 81 mg, Oral, Daily  atorvastatin, 20 mg, Oral, Nightly  azelastine, 2 spray, Each Nare, BID  castor oil-balsam peru, 1 Application, Topical, Q12H  heparin (porcine), 5,000 Units, Subcutaneous, Q8H  insulin glargine, 8 Units, Subcutaneous, Nightly  Insulin Lispro, 2-9 Units, Subcutaneous, 4x Daily AC & at Bedtime  lactobacillus acidophilus, 1 capsule, Oral, Daily  levothyroxine, 88 mcg, Oral, Q AM  midodrine, 10 mg, Oral, TID AC  pantoprazole, 40 mg, Oral, Q AM  piperacillin-tazobactam, 3.375 g, Intravenous, Q8H  polyethylene glycol, 17 g, Oral, Daily  senna-docusate sodium, 2 tablet, Oral, BID  sodium chloride, 10 mL, Intravenous, Q12H  tamsulosin, 0.4 mg, Oral, Daily        Vital Sign Min/Max for last 24 hours  Temp  Min: 97 °F (36.1 °C)  Max: 97.9 °F (36.6 °C)   BP  Min: 79/53  Max: 125/109   Pulse  Min: 59  Max: 75   Resp  Min: 16  Max: 20   SpO2  Min: 92 %  Max: 100 %   No data recorded       Input/Output for last 24 hour shift  04/29 0701 - 04/30 0700  In: 2439.7 [P.O.:1440; I.V.:889.7]  Out: 535 [Urine:535]      Objective:  Vital  "signs: (most recent): Blood pressure 98/64, pulse 65, temperature 97 °F (36.1 °C), temperature source Bladder, resp. rate 16, height 190 cm (74.8\"), weight 87.9 kg (193 lb 12.6 oz), SpO2 97%.            General Appearance: Awake, alert, in no acute distress  Lungs:   B/L Breath sounds present with decreased breath sounds on bases, no wheezing heard, no crackles.   Heart: S1 and S2 present, no murmur  Abdomen: Soft, nontender, no guarding or rigidity, bowel sounds positive.  Extremities:   no edema, warm to touch.  Right lower extremity dressing intact  Neurologic:  Moving all four extremities. Good strength bilaterally.      Results from last 7 days   Lab Units 04/30/24 0447 04/29/24 0455 04/28/24  0405   WBC 10*3/mm3 9.12 9.28 10.11   HEMOGLOBIN g/dL 8.8* 7.9* 8.0*   PLATELETS 10*3/mm3 232 205 183     Results from last 7 days   Lab Units 04/30/24 0447 04/29/24  0455 04/28/24  0405 04/27/24  0607 04/26/24  0410   SODIUM mmol/L 140 142 141 142 137   POTASSIUM mmol/L 4.3 4.0 4.5 4.7 4.8   CO2 mmol/L 17.0* 19.0* 17.0* 18.0* 16.0*   BUN mg/dL 82* 87* 84* 82* 78*   CREATININE mg/dL 2.68* 2.52* 2.65* 2.37* 2.28*   MAGNESIUM mg/dL 2.8*  --   --  2.9* 2.9*   PHOSPHORUS mg/dL 4.5  --   --   --   --    GLUCOSE mg/dL 162* 170* 157* 142* 188*     Estimated Creatinine Clearance: 27.8 mL/min (A) (by C-G formula based on SCr of 2.68 mg/dL (H)).    Results from last 7 days   Lab Units 04/26/24  0313   PH, ARTERIAL pH units 7.441   PCO2, ARTERIAL mm Hg 24.6*   PO2 ART mm Hg 118.0*       Images:   None new    I reviewed the patient's results and images.     Assessment & Plan   Impression        Cardiac arrest    Type 2 diabetes mellitus with hyperglycemia    Hyperlipidemia LDL goal <70    Hypothyroidism (acquired)    Coronary artery disease involving native coronary artery of native heart with angina pectoris    Pacemaker    HFrEF (heart failure with reduced ejection fraction)    Anemia, chronic disease    Stage 3b chronic kidney " disease    UTI (urinary tract infection)    Acute respiratory failure with hypoxia    Elevated troponin level not due myocardial infarction       Plan        1.  Patient s/p cardiac arrest complicated by shock liver and acute on chronic renal failure.  Patient seems to be recovering.  Has severe cardiomyopathy with ejection fraction less than 20%.  Cardiology team is following.  Continue aspirin, statin.  Plan is to repeat echocardiogram.  2.  Hemodynamically unstable still requiring norepinephrine.  Possible stunned myocardium postcardiac arrest versus severe cardiomyopathy.  Continue monitoring closely.  Started on high-dose midodrine and.  Tolerating well.  Cortisol level checked and does not suggest adrenal insufficiency.  Will need further goal-directed management of his heart failure once hemodynamically more stable.  3.  Continue antibiotics per ID team.  4.   Oral intake improving per patient and family.  5.  Continue current insulin regimen for hyperglycemia management.  Will make further adjustments as needed  6.  Eliquis has been on hold due to ongoing renal and liver dysfunction.  Continue subcu heparin for DVT prophylaxis as patient will be high risk for DVT.  7.  GI prophylaxis.  8.  Out of bed and mobilize.  9.  Respiratory status seems to be stabilizing.  Continue Mucomyst neb for another 24 hours and we will evaluate again in the morning.    Continue close monitoring in ICU as remains at risk of decline.    Plan of care and goals reviewed with multidisciplinary/antibiotic stewardship team during rounds.   I discussed the patient's findings and my recommendations with patient, family, and nursing staff     Time spent Critical care 30 min (exclusive of procedure time)  including high complexity decision making to assess, manipulate, and support vital organ system failure in this individual who has impairment of one or more vital organ systems such that there is a high probability of imminent or life  threatening deterioration in the patient’s condition.      Antonio Angeles MD, [facility]  Pulmonary, Critical care and Sleep Medicine         Electronically signed by Antonio Angeles MD at 04/30/24 1318          Consult Notes (most recent note)        Sage Soto MD at 04/25/24 2118        Consult Orders    1. Inpatient Urology Consult [[id number]] ordered by Luz Elena Batista DO at 04/23/24 1017                 Urology Consult Note    Jermaine Singh  LOS: 2      ASSESSMENT:    79 y.o. male with readmit for fungal UTI and a foot ulcer.  I have seen in office with recent cysto showing changes of cystitis and have planned office follow up and repeat cysto soon     DISCUSSION/RECOMMENDATIONS/PLAN:  No change in urologic plan at this time.  I will see back in office in next 2 weeks and schedule repeat cysto at some point - I will be managing his OAB annd leakage sx.  He should continue flomax and complete current antimicrobials.    HPI:  Jermaine Singh is a 79 y.o. male who was admitted 4/22/2024  for UTI (urinary tract infection) [N39.0]. He required cath in ER but voiding well since with some incontinence. Currently being treated for fungal UTI.  I did complete very recent cystoscopy on him.    Past Medical History:   Diagnosis Date    Allergic     Arthritis     Asthma     Coronary artery disease     Diabetes mellitus 2000    started on inuslin 12/2018; started on po meds in 2000; checking blood sugars daily     Diabetic foot infection 07/23/2023    Disease of thyroid gland     po meds daily for hypothyroidism     Elevated serum creatinine 11/15/2022    Elevated troponin 10/02/2022    Generalized weakness 11/15/2022    History of fracture as a child     rt leg- severe     Hyperlipidemia     Hypertension     Hypothyroidism     PAF (paroxysmal atrial fibrillation) 07/23/2023    Peripheral neuropathy     Peripheral vascular disease     s/p angiogram 2/19-needs stent in left leg     Pleural effusion, bilateral  11/15/2022    Proximal phalanx fracture of the second digit extending into the second metatarsal joint 07/28/2022    RBBB     Right knee pain     Status post amputation of great toe and second toe of left foot 07/23/2023    Status post amputation of great toe, left 11/15/2022    Unstable angina 02/12/2019    Added automatically from request for surgery [id number]    Vitamin D deficiency      Past Surgical History:   Procedure Laterality Date    AMPUTATION DIGIT Left 01/17/2023    Procedure: AMPUTATION DIGIT LEFT;  Surgeon: Gopal Márquez MD;  Location:  [facility];  Service: Vascular;  Laterality: Left;    APPENDECTOMY      CARDIAC CATHETERIZATION N/A 02/14/2019    Procedure: Left Heart Cath;  Surgeon: Cooper Apodaca MD;  Location:  [facility] INVASIVE [facility];  Service: Cardiology    CARDIAC ELECTROPHYSIOLOGY PROCEDURE N/A 05/18/2022    Procedure: DEVICE IMPLANT;  Surgeon: Cooper Apodaca MD;  Location:  [facility] INVASIVE [facility];  Service: Cardiology;  Laterality: N/A;    CARDIAC SURGERY      COLONOSCOPY      CORONARY ARTERY BYPASS GRAFT N/A 03/22/2019    Procedure: MEDIAN STERNOTOMY, CORONARY ARTERY BYPASS GRAFT X3, UTILIZING THE LEFT INTERNAL MAMMARY ARTERY, EVH AND OPEN HARVEST OF THE RIGHT GREATER SAPHENOUS VEIN, EXPLORATION OF THE LEFT LEG;  Surgeon: Ap Au MD;  Location:  [facility];  Service: Cardiothoracic    EYE SURGERY Bilateral     cataracts     FRACTURE SURGERY      INTERVENTIONAL RADIOLOGY PROCEDURE N/A 07/29/2021    Procedure: Abdominal Aortagram with Runoff;  Surgeon: Jermaine Hernandez MD;  Location:  [facility] IN[facility];  Service: Cardiovascular;  Laterality: N/A;    KNEE ARTHROSCOPY      right x 2, left x 1    LACERATION REPAIR      right leg    LEG SURGERY      2 for fracture of rt leg     TONSILLECTOMY      Adnoidectomy    TRANS METATARSAL AMPUTATION Left 08/02/2022    Procedure: GREAT TOE AMPUTATION LEFT;  Surgeon: Gopal Márquez MD;  Location:  [facility];   Service: Vascular;  Laterality: Left;     Medications Prior to Admission   Medication Sig Dispense Refill Last Dose    acetaminophen (TYLENOL) 325 MG tablet Take 2 tablets by mouth Every 6 (Six) Hours As Needed for Mild Pain.       apixaban (ELIQUIS) 5 MG tablet tablet Take 1 tablet by mouth Every 12 (Twelve) Hours. Indications: DVT/PE (active thrombosis) 180 tablet 0     aspirin 81 MG chewable tablet Chew 1 tablet Daily. 90 tablet 3     atorvastatin (LIPITOR) 20 MG tablet Take 1 tablet by mouth Every Night. 90 tablet 1     azelastine (ASTELIN) 0.1 % nasal spray 2 sprays into the nostril(s) as directed by provider 2 (Two) Times a Day. Use in each nostril as directed 30 mL 2     bumetanide (BUMEX) 1 MG tablet Take 1 tablet by mouth 2 (Two) Times a Day. (Patient taking differently: Take 1 tablet by mouth 2 (Two) Times a Day. Qd-bid) 60 tablet 6     carvedilol (COREG) 3.125 MG tablet Take 1 tablet by mouth 2 (Two) Times a Day With Meals.       Cholecalciferol (VITAMIN D3) 5000 units tablet tablet Take 1 tablet by mouth Daily.       Diclofenac Sodium (VOLTAREN) 1 % gel gel Apply 2 g topically to the appropriate area as directed 4 (Four) Times a Day As Needed (pain).       Elastic Bandages & Supports (Hernia Support Right Medium) misc Use daily 1 each 0     famotidine (PEPCID) 20 MG tablet Take 1 tablet by mouth 2 (Two) Times a Day As Needed for Heartburn. 180 tablet 1     fluconazole (Diflucan) 100 MG tablet 2 PO  x 1d, then 1 PO QD x 4d 6 tablet 0     fluocinonide (LIDEX) 0.05 % cream Apply topically to affected area BID x 1 week then once daily as needed for rash 60 g 2     glucose blood (OneTouch Verio) test strip 1 each by Other route 2 (Two) Times a Day. Use as instructed 100 each 3     Hydrocortisone, Perianal, (ANUSOL-HC) 2.5 % rectal cream Insert  into the rectum 2 (Two) Times a Day. 30 g 3     insulin detemir (LEVEMIR) 100 UNIT/ML injection Inject 10 Units under the skin into the appropriate area as directed  Every Night.       Insulin Lispro (humaLOG) 100 UNIT/ML injection Inject 3 Units under the skin into the appropriate area as directed 3 (Three) Times a Day Before Meals. Under 150 3 unit  150-200 4 units  200-250- 5 unit       isosorbide mononitrate (IMDUR) 30 MG 24 hr tablet Take 1 tablet by mouth Daily. 30 tablet 0     levothyroxine (SYNTHROID, LEVOTHROID) 88 MCG tablet Take 1 tablet by mouth Every Morning. 90 tablet 1     linaclotide (Linzess) 290 MCG capsule capsule Take 1 capsule by mouth Every Morning Before Breakfast. 30 capsule 2     losartan (COZAAR) 100 MG tablet Take 0.5 tablets by mouth Daily.       Misc. Devices (Rollator Ultra-Light) misc 1 each Daily. 1 each 0     mupirocin (BACTROBAN) 2 % nasal ointment APPLY OINTMENT TOPICALLY TO AFFECTED AREA TWICE DAILY       nitroglycerin (NITROSTAT) 0.4 MG SL tablet Place 1 tablet under the tongue Every 5 (Five) Minutes As Needed for Chest Pain. Take no more than 3 doses in 15 minutes. 9 tablet 12     nystatin (MYCOSTATIN) 632120 UNIT/GM powder Apply  topically to the appropriate area as directed 2 (Two) Times a Day. 60 g 5     Probiotic Product (Align) 4 MG capsule Take 1 each by mouth Daily.       Vibegron (Gemtesa) 75 MG tablet Take 1 tablet by mouth Daily. 14 tablet 0     Xiidra 5 % ophthalmic solution Administer 1 drop to both eyes 2 (Two) Times a Day.        Allergies   Allergen Reactions    Vancomycin Other (See Comments)     Kidney failure per last admission     Family History   Problem Relation Age of Onset    Coronary artery disease Mother     Diabetes Mother     Hyperlipidemia Mother     Cancer Father      Social History     Socioeconomic History    Marital status:     Number of children: 2   Tobacco Use    Smoking status: Never     Passive exposure: Past    Smokeless tobacco: Never   Vaping Use    Vaping status: Never Used   Substance and Sexual Activity    Alcohol use: Not Currently     Comment: every 2-3 months    Drug use: No    Sexual  "activity: Not Currently     Partners: Female         Review of Systems  NO fever or chills  No hematuria    Objective     Blood pressure 91/62, pulse 86, temperature 96.8 °F (36 °C), temperature source Bladder, resp. rate 16, height 190 cm (74.8\"), weight 77 kg (169 lb 12.1 oz), SpO2 100%.  BP 91/62   Pulse 86   Temp 96.8 °F (36 °C) (Bladder)   Resp 16   Ht 190 cm (74.8\")   Wt 77 kg (169 lb 12.1 oz)   SpO2 100%   BMI 21.33 kg/m²     Physicial Exam    General: WDWN male in NAD, sitting up in chair  Back: No CVAT, spine linear and non-tender   Abdomen: Soft and non-tender with no masses, organomegaly or guarding.  Neuro: Nonfocal        Imaging  none    Labs  Urine Microscopy:   Results from last 7 days   Lab Units 24  1756   RBC UA /HPF 6-10*   WBC UA /HPF Too Numerous to Count*   , Urinalysis:   Results from last 7 days   Lab Units 24  1756   PH, URINE  5.5   PROTEIN UA  30 mg/dL (1+)*   GLUCOSE UA  Negative   KETONES UA  Negative   , BMP:   Results from last 7 days   Lab Units 24  1441   SODIUM mmol/L 140   POTASSIUM mmol/L 4.5   CALCIUM mg/dL 8.4*   CHLORIDE mmol/L 106   CO2 mmol/L 13.0*   BUN mg/dL 77*   CREATININE mg/dL 2.07*   ALBUMIN g/dL 3.3*   BILIRUBIN mg/dL 0.6   ALK PHOS U/L 152*   , and CBC:   Results from last 7 days   Lab Units 24  1441   WBC 10*3/mm3 9.14   RBC 10*6/mm3 3.20*   HEMOGLOBIN g/dL 9.6*   HEMATOCRIT % 30.8*   PLATELETS 10*3/mm3 169       Sage Soto MD - 2024, 21:18 EDT    Electronically signed by Sage Soto MD at 245          Physical Therapy Notes (most recent note)        Bessie Servin, PT at 24 3710  Version 1 of 1         Patient Name: Jermaine Singh  : 1945    MRN: [id number]                              Today's Date: 2024       Admit Date: 2024    Visit Dx:     ICD-10-CM ICD-9-CM   1. Acute UTI  N39.0 599.0   2. Generalized weakness  R53.1 780.79   3. Edema of right lower extremity  R60.0 782.3   4. Leg " erythema  L53.9 695.9   5. Chronic kidney disease, unspecified CKD stage  N18.9 585.9   6. Elevated troponin  R79.89 790.6   7. Hyperkalemia  E87.5 276.7   8. Cardiac arrest  I46.9 427.5   9. Oropharyngeal dysphagia  R13.12 787.22     Patient Active Problem List   Diagnosis    Type 2 diabetes mellitus with hyperglycemia    Hyperlipidemia LDL goal <70    Hypothyroidism (acquired)    Vitamin D deficiency on Rx     Diabetic neuropathy    Coronary artery disease involving native coronary artery of native heart with angina pectoris    Atherosclerosis of native artery of both lower extremities with intermittent claudication    Pacemaker    HFrEF (heart failure with reduced ejection fraction)    DVT, bilateral lower limbs    Cellulitis of right lower extremity    Anemia, chronic disease    PVD (peripheral vascular disease)    Diabetic foot ulcer    GERD without esophagitis    Stage 3b chronic kidney disease    Right inguinal hernia    PAF (paroxysmal atrial fibrillation)    Moderate malnutrition    UTI (urinary tract infection)    Cardiac arrest    Acute respiratory failure with hypoxia    History of DVT (deep vein thrombosis)    Elevated troponin level not due myocardial infarction     Past Medical History:   Diagnosis Date    Allergic     Arthritis     Asthma     Coronary artery disease     Diabetes mellitus 2000    started on inuslin 12/2018; started on po meds in 2000; checking blood sugars daily     Diabetic foot infection 07/23/2023    Disease of thyroid gland     po meds daily for hypothyroidism     Elevated serum creatinine 11/15/2022    Elevated troponin 10/02/2022    Generalized weakness 11/15/2022    History of fracture as a child     rt leg- severe     Hyperlipidemia     Hypertension     Hypothyroidism     PAF (paroxysmal atrial fibrillation) 07/23/2023    Peripheral neuropathy     Peripheral vascular disease     s/p angiogram 2/19-needs stent in left leg     Pleural effusion, bilateral 11/15/2022     Proximal phalanx fracture of the second digit extending into the second metatarsal joint 07/28/2022    RBBB     Right knee pain     Status post amputation of great toe and second toe of left foot 07/23/2023    Status post amputation of great toe, left 11/15/2022    Unstable angina 02/12/2019    Added automatically from request for surgery [id number]    Vitamin D deficiency      Past Surgical History:   Procedure Laterality Date    AMPUTATION DIGIT Left 01/17/2023    Procedure: AMPUTATION DIGIT LEFT;  Surgeon: Gopal Márquez MD;  Location:  [facility];  Service: Vascular;  Laterality: Left;    APPENDECTOMY      CARDIAC CATHETERIZATION N/A 02/14/2019    Procedure: Left Heart Cath;  Surgeon: Cooper Apodaca MD;  Location: [facility] CATH INVASIVE LOCATION;  Service: Cardiology    CARDIAC ELECTROPHYSIOLOGY PROCEDURE N/A 05/18/2022    Procedure: DEVICE IMPLANT;  Surgeon: Cooper Apodaca MD;  Location:  [facility];  Service: Cardiology;  Laterality: N/A;    CARDIAC SURGERY      COLONOSCOPY      CORONARY ARTERY BYPASS GRAFT N/A 03/22/2019    Procedure: MEDIAN STERNOTOMY, CORONARY ARTERY BYPASS GRAFT X3, UTILIZING THE LEFT INTERNAL MAMMARY ARTERY, EVH AND OPEN HARVEST OF THE RIGHT GREATER SAPHENOUS VEIN, EXPLORATION OF THE LEFT LEG;  Surgeon: Ap Au MD;  Location:  [facility];  Service: Cardiothoracic    EYE SURGERY Bilateral     cataracts     FRACTURE SURGERY      INTERVENTIONAL RADIOLOGY PROCEDURE N/A 07/29/2021    Procedure: Abdominal Aortagram with Runoff;  Surgeon: Jermaine Hernandez MD;  Location:  [facility] IN[facility];  Service: Cardiovascular;  Laterality: N/A;    KNEE ARTHROSCOPY      right x 2, left x 1    LACERATION REPAIR      right leg    LEG SURGERY      2 for fracture of rt leg     TONSILLECTOMY      Adnoidectomy    TRANS METATARSAL AMPUTATION Left 08/02/2022    Procedure: GREAT TOE AMPUTATION LEFT;  Surgeon: Gopal Márquez MD;  Location:  [facility];  Service: Vascular;   Laterality: Left;      General Information       [facility] Name 04/30/24 1145          Physical Therapy Time and Intention    Document Type therapy note (daily note)  -     Mode of Treatment physical therapy  -[facility] Name 04/30/24 1145          General Information    Patient Profile Reviewed yes  -     Existing Precautions/Restrictions fall;cardiac;oxygen therapy device and L/min;orthostatic hypotension;other (see comments)  ART line, RLE wounds, orthostatic hypotension, monitor BP  -     Barriers to Rehab medically complex;previous functional deficit  -[facility] Name 04/30/24 1145          Cognition    Orientation Status (Cognition) oriented x 3  -[facility] Name 04/30/24 1145          Safety Issues, Functional Mobility    Safety Issues Affecting Function (Mobility) awareness of need for assistance;insight into deficits/self-awareness;safety precaution awareness;safety precautions follow-through/compliance;sequencing abilities  -     Impairments Affecting Function (Mobility) balance;endurance/activity tolerance;strength;postural/trunk control;pain;motor control;motor planning;shortness of breath  -               User Key  (r) = Recorded By, (t) = Taken By, (c) = Cosigned By      Initials Name Provider Type     Bessie Servin PT Physical Therapist                   Mobility       [facility] Name 04/30/24 1146          Bed Mobility    Comment, (Bed Mobility) EOB w/ OT upon arrival  -       Row Name 04/30/24 1146          Transfers    Comment, (Transfers) VCs for hand placement and sequencing. BP measured after transfer and increased to 100/70 reclined w/ BLEs elevated  -[facility] Name 04/30/24 1146          Bed-Chair Transfer    Bed-Chair Leake (Transfers) maximum assist (25% patient effort);2 person assist;verbal cues  -     Assistive Device (Bed-Chair Transfers) other (see comments)  B UE support  -     Comment, (Bed-Chair Transfer) SPT bed>chair. Cues for upright posture  -[facility]  Name 04/30/24 1146          Sit-Stand Transfer    Sit-Stand Maury (Transfers) maximum assist (25% patient effort);2 person assist;verbal cues  -     Assistive Device (Sit-Stand Transfers) other (see comments)  B UE support  -     Comment, (Sit-Stand Transfer) STS from EOB. Cues for upright posture  -       Row Name 04/30/24 1146          Gait/Stairs (Locomotion)    Maury Level (Gait) unable to assess  -     Comment, (Gait/Stairs) Deferred d/t weakness and instability during transfer  -               User Key  (r) = Recorded By, (t) = Taken By, (c) = Cosigned By      Initials Name Provider Type     Bessie Servin PT Physical Therapist                   Obj/Interventions       Row Name 04/30/24 1149          Motor Skills    Therapeutic Exercise knee;ankle  -[facility] Name 04/30/24 1149          Knee (Therapeutic Exercise)    Knee (Therapeutic Exercise) strengthening exercise  -     Knee Strengthening (Therapeutic Exercise) bilateral;heel slides;10 repetitions  -       Row Name 04/30/24 1149          Ankle (Therapeutic Exercise)    Ankle (Therapeutic Exercise) AROM (active range of motion)  -     Ankle AROM (Therapeutic Exercise) bilateral;dorsiflexion;plantarflexion;10 repetitions  -       Row Name 04/30/24 1149          Balance    Balance Assessment sitting static balance;sitting dynamic balance;sit to stand dynamic balance;standing static balance;standing dynamic balance  -     Static Sitting Balance minimal assist  -     Dynamic Sitting Balance moderate assist  -     Position, Sitting Balance unsupported;sitting edge of bed;sitting in chair  -     Sit to Stand Dynamic Balance maximum assist;2-person assist;verbal cues  -     Static Standing Balance maximum assist;2-person assist;verbal cues  -     Dynamic Standing Balance maximum assist;2-person assist;verbal cues  -     Position/Device Used, Standing Balance supported  -     Balance Interventions  sitting;standing;sit to stand;supported;static;dynamic  [facility]               User Key  (r) = Recorded By, (t) = Taken By, (c) = Cosigned By      Initials Name Provider Type     Bessie Servin PT Physical Therapist                   Goals/Plan    No documentation.                  Clinical Impression       Row Name 04/30/24 1150          Pain    Pain Intervention(s) Repositioned;Ambulation/increased activity  -     Additional Documentation Pain Scale: FACES Pre/Post-Treatment (Group)  -       Row Name 04/30/24 1150          Pain Scale: FACES Pre/Post-Treatment    Pain: FACES Scale, Pretreatment 8-->hurts whole lot  -     Posttreatment Pain Rating 8-->hurts whole lot  -LH     Pain Location - Side/Orientation Right  -     Pain Location upper  -     Pain Location - abdomen  -[facility] Name 04/30/24 1150          Plan of Care Review    Plan of Care Reviewed With patient;spouse  -     Progress improving  -     Outcome Evaluation Pt demonstrating improvements this date by performing STS and SPT bed>chair w/ maxA x2 and B UE support. Pt continues to present below baseline function d/t dizziness, pain, generalized weakness, decreased balance, and decreased activity tolerance. Pt would cont to benefit from skilled IP PT. Rec IRF at d/c.  -       Row Name 04/30/24 1150          Vital Signs    Pre Systolic BP Rehab 103  -LH     Pre Treatment Diastolic BP 66  -LH     Intra Systolic BP Rehab 81   EOB  -     Intra Treatment Diastolic BP 62  -LH     Post Systolic BP Rehab 100  reclined w/ B LEs elevated  -LH     Post Treatment Diastolic BP 70  -LH     Pretreatment Heart Rate (beats/min) 60  -LH     Posttreatment Heart Rate (beats/min) 61  -LH     Pre SpO2 (%) 95  -LH     O2 Delivery Pre Treatment room air  -     O2 Delivery Intra Treatment room air  -     Post SpO2 (%) 99  -LH     O2 Delivery Post Treatment room air  -     Pre Patient Position Sitting  -     Intra Patient Position Standing  -      Post Patient Position Sitting  -       Row Name 04/30/24 1150          Positioning and Restraints    Pre-Treatment Position in bed  -     Post Treatment Position chair  -     In Chair notified nsg;reclined;sitting;call light within reach;encouraged to call for assist;exit alarm on;with family/caregiver;waffle cushion;on mechanical lift sling;legs elevated  -               User Key  (r) = Recorded By, (t) = Taken By, (c) = Cosigned By      Initials Name Provider Type     Bessie Servin, SETH Physical Therapist                   Outcome Measures       Row Name 04/30/24 1153          How much help from another person do you currently need...    Turning from your back to your side while in flat bed without using bedrails? 2  -LH     Moving from lying on back to sitting on the side of a flat bed without bedrails? 2  -LH     Moving to and from a bed to a chair (including a wheelchair)? 2  -LH     Standing up from a chair using your arms (e.g., wheelchair, bedside chair)? 2  -LH     Climbing 3-5 steps with a railing? 1  -LH     To walk in hospital room? 1  -     AM-PAC 6 Clicks Score (PT) 10  -     Highest Level of Mobility Goal 4 --> Transfer to chair/commode  -       Row Name 04/30/24 1153 04/30/24 1006       Functional Assessment    Outcome Measure Options AM-PAC 6 Clicks Basic Mobility (PT)  - AM-PAC 6 Clicks Daily Activity (OT)  -              User Key  (r) = Recorded By, (t) = Taken By, (c) = Cosigned By      Initials Name Provider Type    Stacy Mcdonnell OT Occupational Therapist     Bessie Servin, PT Physical Therapist                                 Physical Therapy Education       Title: PT OT SLP Therapies (In Progress)       Topic: Physical Therapy (In Progress)       Point: Mobility training (In Progress)       Learning Progress Summary             Patient Acceptance, E, NR by  at 4/30/2024 1153    Acceptance, E, NR by  at 4/29/2024 1159    EagKARINA warren, ROBERTO,DU,NR by  at  4/24/2024 1452    Comment: Reviewed safety/technique w/transfers, ambulation, HEP, PT POC    Acceptance, E, VU by KR at 4/23/2024 1120   Family Acceptance, E, NR by  at 4/30/2024 1153    Acceptance, E, NR by  at 4/29/2024 1159    Acceptance, E, VU by KR at 4/23/2024 1120                         Point: Home exercise program (In Progress)       Learning Progress Summary             Patient Acceptance, E, NR by  at 4/30/2024 1153    Eager, E, VU,DU,NR by  at 4/24/2024 1452    Comment: Reviewed safety/technique w/transfers, ambulation, HEP, PT POC   Family Acceptance, E, NR by  at 4/30/2024 1153                         Point: Body mechanics (In Progress)       Learning Progress Summary             Patient Acceptance, E, NR by  at 4/30/2024 1153    Acceptance, E, NR by  at 4/29/2024 1159    Eager, E, VU,DU,NR by  at 4/24/2024 1452    Comment: Reviewed safety/technique w/transfers, ambulation, HEP, PT POC    Acceptance, E, VU by KR at 4/23/2024 1120   Family Acceptance, E, NR by  at 4/30/2024 1153    Acceptance, E, NR by  at 4/29/2024 1159    Acceptance, E, VU by  at 4/23/2024 1120                         Point: Precautions (In Progress)       Learning Progress Summary             Patient Acceptance, E, NR by  at 4/30/2024 1153    Acceptance, E, NR by  at 4/29/2024 1159    Eager, E, VU,DU,NR by  at 4/24/2024 1452    Comment: Reviewed safety/technique w/transfers, ambulation, HEP, PT POC    Acceptance, E, VU by  at 4/23/2024 1120   Family Acceptance, E, NR by  at 4/30/2024 1153    Acceptance, E, NR by  at 4/29/2024 1159    Acceptance, E, VU by  at 4/23/2024 1120                                         User Key       Initials Effective Dates Name Provider Type Discipline     06/01/21 -  Jenna Stanley, PT Physical Therapist PT    ELENITA 12/30/22 -  Tiffanie Erwin, PT Physical Therapist PT     09/21/23 -  Bessie Servin, PT Physical Therapist PT                  PT Recommendation and  Plan  Planned Therapy Interventions (PT): balance training, bed mobility training, gait training, home exercise program, neuromuscular re-education, patient/family education, postural re-education, ROM (range of motion), strengthening, stretching, stair training, transfer training  Plan of Care Reviewed With: patient, spouse  Progress: improving  Outcome Evaluation: Pt demonstrating improvements this date by performing STS and SPT bed>chair w/ maxA x2 and B UE support. Pt continues to present below baseline function d/t dizziness, pain, generalized weakness, decreased balance, and decreased activity tolerance. Pt would cont to benefit from skilled IP PT. Rec IRF at d/c.     Time Calculation:         PT Charges       Row Name 04/30/24 1153             Time Calculation    Start Time 0852  -      PT Received On 04/30/24  -      PT Goal Re-Cert Due Date 05/09/24  -         Timed Charges    61318 - PT Therapeutic Exercise Minutes 5  -LH      11121 - PT Therapeutic Activity Minutes 5  -LH         Total Minutes    Timed Charges Total Minutes 10  -LH       Total Minutes 10  -LH                User Key  (r) = Recorded By, (t) = Taken By, (c) = Cosigned By      Initials Name Provider Type     Bessie Servin, PT Physical Therapist                  Therapy Charges for Today       Code Description Service Date Service Provider Modifiers Qty    63672877619 HC PT THERAPEUTIC ACT EA 15 MIN 4/29/2024 Bessie Servin, PT GP 1    71514200814 HC PT RE-EVAL ESTABLISHED PLAN 2 4/29/2024 Bessie Servin, PT GP 1    [id number]  PT THER PROC EA 15 MIN 4/30/2024 Bessie Servin, PT GP 1            PT G-Codes  Outcome Measure Options: AM-PAC 6 Clicks Basic Mobility (PT)  AM-PAC 6 Clicks Score (PT): 10  AM-PAC 6 Clicks Score (OT): 12  Modified Ben[place] Scale: 4 - Moderately severe disability.  Unable to walk without assistance, and unable to attend to own bodily needs without assistance.  PT Discharge Summary  Anticipated Discharge  Disposition (PT): inpatient rehabilitation facility    Bessie Servin, PT  2024      Electronically signed by Bessie Servin, PT at 24 1155          Occupational Therapy Notes (most recent note)        Stacy Sevilla, OT at 24 0843          Patient Name: Jermaine Singh  : 1945    MRN: [id number]                              Today's Date: 2024       Admit Date: 2024    Visit Dx:     ICD-10-CM ICD-9-CM   1. Acute UTI  N39.0 599.0   2. Generalized weakness  R53.1 780.79   3. Edema of right lower extremity  R60.0 782.3   4. Leg erythema  L53.9 695.9   5. Chronic kidney disease, unspecified CKD stage  N18.9 585.9   6. Elevated troponin  R79.89 790.6   7. Hyperkalemia  E87.5 276.7   8. Cardiac arrest  I46.9 427.5   9. Oropharyngeal dysphagia  R13.12 787.22     Patient Active Problem List   Diagnosis    Type 2 diabetes mellitus with hyperglycemia    Hyperlipidemia LDL goal <70    Hypothyroidism (acquired)    Vitamin D deficiency on Rx     Diabetic neuropathy    Coronary artery disease involving native coronary artery of native heart with angina pectoris    Atherosclerosis of native artery of both lower extremities with intermittent claudication    Pacemaker    HFrEF (heart failure with reduced ejection fraction)    DVT, bilateral lower limbs    Cellulitis of right lower extremity    Anemia, chronic disease    PVD (peripheral vascular disease)    Diabetic foot ulcer    GERD without esophagitis    Stage 3b chronic kidney disease    Right inguinal hernia    PAF (paroxysmal atrial fibrillation)    Moderate malnutrition    UTI (urinary tract infection)    Cardiac arrest    Acute respiratory failure with hypoxia    History of DVT (deep vein thrombosis)    Elevated troponin level not due myocardial infarction     Past Medical History:   Diagnosis Date    Allergic     Arthritis     Asthma     Coronary artery disease     Diabetes mellitus 2000    started on inuslin 2018; started on po meds  in 2000; checking blood sugars daily     Diabetic foot infection 07/23/2023    Disease of thyroid gland     po meds daily for hypothyroidism     Elevated serum creatinine 11/15/2022    Elevated troponin 10/02/2022    Generalized weakness 11/15/2022    History of fracture as a child     rt leg- severe     Hyperlipidemia     Hypertension     Hypothyroidism     PAF (paroxysmal atrial fibrillation) 07/23/2023    Peripheral neuropathy     Peripheral vascular disease     s/p angiogram 2/19-needs stent in left leg     Pleural effusion, bilateral 11/15/2022    Proximal phalanx fracture of the second digit extending into the second metatarsal joint 07/28/2022    RBBB     Right knee pain     Status post amputation of great toe and second toe of left foot 07/23/2023    Status post amputation of great toe, left 11/15/2022    Unstable angina 02/12/2019    Added automatically from request for surgery [id number]    Vitamin D deficiency      Past Surgical History:   Procedure Laterality Date    AMPUTATION DIGIT Left 01/17/2023    Procedure: AMPUTATION DIGIT LEFT;  Surgeon: Gopal Márquez MD;  Location:  [facility];  Service: Vascular;  Laterality: Left;    APPENDECTOMY      CARDIAC CATHETERIZATION N/A 02/14/2019    Procedure: Left Heart Cath;  Surgeon: Cooper Apodaca MD;  [facility];  Service: Cardiology    CARDIAC ELECTROPHYSIOLOGY PROCEDURE N/A 05/18/2022    Procedure: DEVICE IMPLANT;  Surgeon: Cooper Apodaca MD;  Location:  [facility];  Service: Cardiology;  Laterality: N/A;    CARDIAC SURGERY      COLONOSCOPY      CORONARY ARTERY BYPASS GRAFT N/A 03/22/2019    Procedure: MEDIAN STERNOTOMY, CORONARY ARTERY BYPASS GRAFT X3, UTILIZING THE LEFT INTERNAL MAMMARY ARTERY, EVH AND OPEN HARVEST OF THE RIGHT GREATER SAPHENOUS VEIN, EXPLORATION OF THE LEFT LEG;  Surgeon: Ap Au MD;  Location:  [facility];  Service: Cardiothoracic    EYE SURGERY Bilateral     cataracts      FRACTURE SURGERY      INTERVENTIONAL RADIOLOGY PROCEDURE N/A 07/29/2021    Procedure: Abdominal Aortagram with Runoff;  Surgeon: Jermaine Hernandez MD;  Location:  [facility];  Service: Cardiovascular;  Laterality: N/A;    KNEE ARTHROSCOPY      right x 2, left x 1    LACERATION REPAIR      right leg    LEG SURGERY      2 for fracture of rt leg     TONSILLECTOMY      Adnoidectomy    TRANS METATARSAL AMPUTATION Left 08/02/2022    Procedure: GREAT TOE AMPUTATION LEFT;  Surgeon: Gopal Márquez MD;  Location:  [facility];  Service: Vascular;  Laterality: Left;      General Information       Row Name 04/30/24 1000          OT Time and Intention    Document Type therapy note (daily note)  -     Mode of Treatment occupational therapy  -       Row Name 04/30/24 1000          General Information    Patient Profile Reviewed yes  -     Existing Precautions/Restrictions fall;cardiac;oxygen therapy device and L/min;orthostatic hypotension;other (see comments)  ART line, RLE wounds, orthostatic hypotension, monitor BP  -     Barriers to Rehab medically complex;previous functional deficit  -       Row Name 04/30/24 1000          Cognition    Orientation Status (Cognition) oriented x 3  -       Row Name 04/30/24 1000          Safety Issues, Functional Mobility    Safety Issues Affecting Function (Mobility) awareness of need for assistance;insight into deficits/self-awareness;safety precaution awareness;safety precautions follow-through/compliance;sequencing abilities  -     Impairments Affecting Function (Mobility) balance;endurance/activity tolerance;strength;postural/trunk control;pain;motor control;motor planning;shortness of breath  -               User Key  (r) = Recorded By, (t) = Taken By, (c) = Cosigned By      Initials Name Provider Type     Stacy Sevilla OT Occupational Therapist                   Lymphedema       Row Name 04/29/24 0815             Lymphedema Edema Assessment     Ptting Edema Category By severity  -      Pitting Edema Mild  -      Recorded by [MF] Cooper Ni, PT              Compression/Skin Care    Compression/Skin Care skin care;wrapping location;bandaging  -      Skin Care washed/dried;lotion applied  -      Wrapping Location lower extremity  -      Wrapping Location LE bilateral:;foot to knee  -      Wrapping Comments RLE unna boot with coban and spandage  -      Recorded by [MF] Cooper Ni, PT                User Key  (r) = Recorded By, (t) = Taken By, (c) = Cosigned By      Initials Name Effective Dates     Cooper Ni, PT 02/03/23 -                    Mobility/ADL's       Row Name 04/30/24 1001          Bed Mobility    Bed Mobility supine-sit  -     Supine-Sit [place] (Bed Mobility) moderate assist (50% patient effort);2 person assist;verbal cues  -     Bed Mobility, Safety Issues decreased use of arms for pushing/pulling;decreased use of legs for bridging/pushing;impaired trunk control for bed mobility  -     Assistive Device (Bed Mobility) head of bed elevated;draw sheet;bed rails  -     Comment, (Bed Mobility) Upon sitting EOB pt w/ noted N/V & dizziness. BP checked and drop in BP noted from 103/66 supine to 81/62 sitting EOB, BP rechecked after ~3 minutes sitting EOB and BP improved to 92/64. Pt required frequent VC's to sit upright and req min-mod A to maintain static sitting balance.  -       Row Name 04/30/24 1001          Transfers    Transfers sit-stand transfer;stand-sit transfer  -       Row Name 04/30/24 1001          Sit-Stand Transfer    Sit-Stand [place] (Transfers) maximum assist (25% patient effort);2 person assist;verbal cues  -     Assistive Device (Sit-Stand Transfers) other (see comments)  -       Row Name 04/30/24 1001          Stand-Sit Transfer    Stand-Sit [place] (Transfers) maximum assist (25% patient effort);2 person assist;verbal cues  -     Assistive Device  (Stand-Sit Transfers) other (see comments)  -       Row Name 04/30/24 1001          Activities of Daily Living    BADL Assessment/Intervention upper body dressing  -       Row Name 04/30/24 1001          Upper Body Dressing Assessment/Training    [place] Level (Upper Body Dressing) don;other (see comments);moderate assist (50% patient effort);verbal cues  hosp gown  -     Position (Upper Body Dressing) edge of bed sitting  -               User Key  (r) = Recorded By, (t) = Taken By, (c) = Cosigned By      Initials Name Provider Type    Stacy Mcdonnell OT Occupational Therapist                   Obj/Interventions       Row Name 04/30/24 1004          Balance    Balance Assessment sitting static balance;sit to stand dynamic balance;standing static balance  -     Static Sitting Balance minimal assist;verbal cues  -     Position, Sitting Balance supported;sitting edge of bed  -     Sit to Stand Dynamic Balance maximum assist;2-person assist;verbal cues  -     Static Standing Balance maximum assist;2-person assist;verbal cues  -     Position/Device Used, Standing Balance supported  -     Balance Interventions sitting;sit to stand;occupation based/functional task  -               User Key  (r) = Recorded By, (t) = Taken By, (c) = Cosigned By      Initials Name Provider Type     Stacy Sevilla OT Occupational Therapist                   Goals/Plan    No documentation.                  Clinical Impression       Row Name 04/30/24 1004          Pain Assessment    Additional Documentation Pain Scale: FACES Pre/Post-Treatment (Group)  -       Row Name 04/30/24 1004          Pain Scale: FACES Pre/Post-Treatment    Pain: FACES Scale, Pretreatment 8-->hurts whole lot  -     Posttreatment Pain Rating 8-->hurts whole lot  -     Pain Location - Side/Orientation Right  -     Pain Location upper  -     Pain Location - abdomen  -       Row Name 04/30/24 1004          Plan of Care  Review    Plan of Care Reviewed With patient;spouse  -     Progress improving  -     Outcome Evaluation Pt presents w/ dizziness, increased pain, decreased functional endurance, generalized weakness, and balance deficits limiting his ADL independence. Will continue to progress pt as tolerated per OT POC. Rec IRF at d/c.  -       Row Name 04/30/24 1004          Therapy Assessment/Plan (OT)    Rehab Potential (OT) good, to achieve stated therapy goals  -     Criteria for Skilled Therapeutic Interventions Met (OT) yes;skilled treatment is necessary  -     Therapy Frequency (OT) daily  -       Row Name 04/30/24 1004          Therapy Plan Review/Discharge Plan (OT)    Anticipated Discharge Disposition (OT) inpatient rehabilitation facility  -       Row Name 04/30/24 1004          Vital Signs    Pre Systolic BP Rehab 103  supine  -MC     Pre Treatment Diastolic BP 66  -MC     Intra Systolic BP Rehab 81  sitting EOB  -MC     Intra Treatment Diastolic BP 62  -MC     Post Systolic BP Rehab 92  sitting EOB  -MC     Post Treatment Diastolic BP 64  -MC     Pretreatment Heart Rate (beats/min) 59  -MC     Posttreatment Heart Rate (beats/min) 64  -MC     Pre SpO2 (%) 97  -MC     O2 Delivery Pre Treatment room air  -MC     O2 Delivery Intra Treatment room air  -MC     Post SpO2 (%) 99  -MC     O2 Delivery Post Treatment room air  -     Pre Patient Position Supine  -     Intra Patient Position Standing  -     Post Patient Position Sitting  -[facility] 04/30/24 1004          Positioning and Restraints    Pre-Treatment Position in bed  -     Post Treatment Position bed  -MC     In Bed sitting EOB;with PT  -               User Key  (r) = Recorded By, (t) = Taken By, (c) = Cosigned By      Initials Name Provider Type    Stacy Mcdonnell OT Occupational Therapist                   Outcome Measures       Row Name 04/30/24 1006          How much help from another is currently needed...    Putting on and  taking off regular lower body clothing? 2  -MC     Bathing (including washing, rinsing, and drying) 2  -MC     Toileting (which includes using toilet bed pan or urinal) 2  -MC     Putting on and taking off regular upper body clothing 2  -MC     Taking care of personal grooming (such as brushing teeth) 2  -MC     Eating meals 2  -MC     AM-PAC 6 Clicks Score (OT) 12  -       Row Name 04/30/24 1006          Functional Assessment    Outcome Measure Options AM-PAC 6 Clicks Daily Activity (OT)  -               User Key  (r) = Recorded By, (t) = Taken By, (c) = Cosigned By      Initials Name Provider Type    Stacy Mcdonnell OT Occupational Therapist                    Occupational Therapy Education       Title: PT OT SLP Therapies (In Progress)       Topic: Occupational Therapy (In Progress)       Point: ADL training (In Progress)       Description:   Instruct learner(s) on proper safety adaptation and remediation techniques during self care or transfers.   Instruct in proper use of assistive devices.                  Learning Progress Summary             Patient Acceptance, E, NR by  at 4/30/2024 1006    Acceptance, E, VU by MR at 4/25/2024 1314    Acceptance, E, VU by SABRINA at 4/23/2024 1344                         Point: Home exercise program (Done)       Description:   Instruct learner(s) on appropriate technique for monitoring, assisting and/or progressing therapeutic exercises/activities.                  Learning Progress Summary             Patient Acceptance, E, VU by MR at 4/25/2024 1314    Acceptance, E, VU by SABRINA at 4/23/2024 1344                         Point: Precautions (In Progress)       Description:   Instruct learner(s) on prescribed precautions during self-care and functional transfers.                  Learning Progress Summary             Patient Acceptance, E, NR by  at 4/30/2024 1006    Acceptance, E, NR by REECE at 4/29/2024 1131    Acceptance, E, VU by MR at 4/25/2024 1314    Acceptance,  E, VU by AN at 4/23/2024 1344   Significant Other Acceptance, E, NR by  at 4/29/2024 1131                         Point: Body mechanics (In Progress)       Description:   Instruct learner(s) on proper positioning and spine alignment during self-care, functional mobility activities and/or exercises.                  Learning Progress Summary             Patient Acceptance, E, NR by  at 4/30/2024 1006    Acceptance, E, NR by  at 4/29/2024 1131    Acceptance, E, VU by MR at 4/25/2024 1314    Acceptance, E, VU by AN at 4/23/2024 1344   Significant Other Acceptance, E, NR by  at 4/29/2024 1131                                         User Key       Initials Effective Dates Name Provider Type Discipline     10/14/22 -  Stacy Sevilla OT Occupational Therapist OT    SABRINA 09/21/21 -  Gin Jaeger, OT Occupational Therapist OT    MR 09/22/22 -  Rosalinda Silva, OT Occupational Therapist OT     02/05/24 -  Naye Salinas, OT Occupational Therapist OT                  OT Recommendation and Plan  Therapy Frequency (OT): daily  Plan of Care Review  Plan of Care Reviewed With: patient, spouse  Progress: improving  Outcome Evaluation: Pt presents w/ dizziness, increased pain, decreased functional endurance, generalized weakness, and balance deficits limiting his ADL independence. Will continue to progress pt as tolerated per OT POC. Rec IRF at d/c.     Time Calculation:         Time Calculation- OT       Row Name 04/30/24 1007             Time Calculation- OT    OT Start Time 0843  -      OT Received On 04/30/24  -      OT Goal Re-Cert Due Date 05/09/24  -         Timed Charges    68066 - OT Therapeutic Activity Minutes 6  -      09675 - OT Self Care/Mgmt Minutes 3  -         Total Minutes    Timed Charges Total Minutes 9  -       Total Minutes 9  -                User Key  (r) = Recorded By, (t) = Taken By, (c) = Cosigned By      Initials Name Provider Type     Stacy Sevilla OT Occupational  Therapist                  Therapy Charges for Today       Code Description Service Date Service Provider Modifiers Qty    04398691785 HC OT THERAPEUTIC ACT EA 15 MIN 2024 Stacy Sevilla OT GO 1                 Stacy Sevilla OT  2024    Electronically signed by Stacy Sevilla OT at 24 1008          Speech Language Pathology Notes (most recent note)        Coty Blanchard, MS CCC-SLP at 24 1535          Acute Care - Speech Language Pathology   Swallow Initial Evaluation  Lynsey  Clinical Swallow Evaluation  +Fiberoptic Endoscopic Evaluation of Swallowing (FEES)       Patient Name: Jermaine Singh  : 1945  MRN: [id number]  Today's Date: 2024               Admit Date: 2024    Visit Dx:     ICD-10-CM ICD-9-CM   1. Acute UTI  N39.0 599.0   2. Generalized weakness  R53.1 780.79   3. Edema of right lower extremity  R60.0 782.3   4. Leg erythema  L53.9 695.9   5. Chronic kidney disease, unspecified CKD stage  N18.9 585.9   6. Elevated troponin  R79.89 790.6   7. Hyperkalemia  E87.5 276.7   8. Cardiac arrest  I46.9 427.5   9. Oropharyngeal dysphagia  R13.12 787.22     Patient Active Problem List   Diagnosis    Type 2 diabetes mellitus with hyperglycemia    Hyperlipidemia LDL goal <70    Hypothyroidism (acquired)    Vitamin D deficiency on Rx     Diabetic neuropathy    Coronary artery disease involving native coronary artery of native heart with angina pectoris    Atherosclerosis of native artery of both lower extremities with intermittent claudication    Pacemaker    HFrEF (heart failure with reduced ejection fraction)    DVT, bilateral lower limbs    Cellulitis of right lower extremity    Anemia, chronic disease    PVD (peripheral vascular disease)    Diabetic foot ulcer    GERD without esophagitis    Stage 3b chronic kidney disease    Right inguinal hernia    PAF (paroxysmal atrial fibrillation)    Moderate malnutrition    UTI (urinary tract infection)    Cardiac  arrest    Acute respiratory failure with hypoxia    History of DVT (deep vein thrombosis)    Elevated troponin level not due myocardial infarction     Past Medical History:   Diagnosis Date    Allergic     Arthritis     Asthma     Coronary artery disease     Diabetes mellitus 2000    started on inuslin 12/2018; started on po meds in 2000; checking blood sugars daily     Diabetic foot infection 07/23/2023    Disease of thyroid gland     po meds daily for hypothyroidism     Elevated serum creatinine 11/15/2022    Elevated troponin 10/02/2022    Generalized weakness 11/15/2022    History of fracture as a child     rt leg- severe     Hyperlipidemia     Hypertension     Hypothyroidism     PAF (paroxysmal atrial fibrillation) 07/23/2023    Peripheral neuropathy     Peripheral vascular disease     s/p angiogram 2/19-needs stent in left leg     Pleural effusion, bilateral 11/15/2022    Proximal phalanx fracture of the second digit extending into the second metatarsal joint 07/28/2022    RBBB     Right knee pain     Status post amputation of great toe and second toe of left foot 07/23/2023    Status post amputation of great toe, left 11/15/2022    Unstable angina 02/12/2019    Added automatically from request for surgery [id number]    Vitamin D deficiency      Past Surgical History:   Procedure Laterality Date    AMPUTATION DIGIT Left 01/17/2023    Procedure: AMPUTATION DIGIT LEFT;  Surgeon: Gopal Márquez MD;  Location:  [facility];  Service: Vascular;  Laterality: Left;    APPENDECTOMY      CARDIAC CATHETERIZATION N/A 02/14/2019    Procedure: Left Heart Cath;  Surgeon: Cooper Apodaca MD;  Location:  [facility] CATH INVASIVE LOCATION;  Service: Cardiology    CARDIAC ELECTROPHYSIOLOGY PROCEDURE N/A 05/18/2022    Procedure: DEVICE IMPLANT;  Surgeon: Cooper Apodaca MD;  Location:  [facility] CATH INVASIVE LOCATION;  Service: Cardiology;  Laterality: N/A;    CARDIAC SURGERY      COLONOSCOPY      CORONARY ARTERY BYPASS GRAFT  N/A 03/22/2019    Procedure: MEDIAN STERNOTOMY, CORONARY ARTERY BYPASS GRAFT X3, UTILIZING THE LEFT INTERNAL MAMMARY ARTERY, EVH AND OPEN HARVEST OF THE RIGHT GREATER SAPHENOUS VEIN, EXPLORATION OF THE LEFT LEG;  Surgeon: Ap Au MD;  Location: [facility];  Service: Cardiothoracic    EYE SURGERY Bilateral     cataracts     FRACTURE SURGERY      INTERVENTIONAL RADIOLOGY PROCEDURE N/A 07/29/2021    Procedure: Abdominal Aortagram with Runoff;  Surgeon: Jermaine Hernandez MD;  Location: formerly [facility] CATH INVASIVE LOCATION;  Service: Cardiovascular;  Laterality: N/A;    KNEE ARTHROSCOPY      right x 2, left x 1    LACERATION REPAIR      right leg    LEG SURGERY      2 for fracture of rt leg     TONSILLECTOMY      Adnoidectomy    TRANS METATARSAL AMPUTATION Left 08/02/2022    Procedure: GREAT TOE AMPUTATION LEFT;  Surgeon: Gopal Márquez MD;  Location:  [facility];  Service: Vascular;  Laterality: Left;       SLP Recommendation and Plan  SLP Swallowing Diagnosis: moderate, pharyngeal dysphagia (04/28/24 1330)  SLP Diet Recommendation: regular textures, no mixed consistencies, nectar thick liquids (no straws) (04/28/24 1330)  Recommended Precautions and Strategies: upright posture during/after eating, small bites of food and sips of liquid, no straw, multiple swallows per bite of food, multiple swallows per sip of liquid, general aspiration precautions, assist with feeding (04/28/24 1330)  SLP Rec. for Method of Medication Administration: meds whole, meds crushed, with puree, as tolerated (04/28/24 1330)     Monitor for Signs of Aspiration: yes, notify SLP if any concerns (04/28/24 1330)  Recommended Diagnostics:  (? SLC eval pending mentation) (04/28/24 1330)  Swallow Criteria for Skilled Therapeutic Interventions Met: demonstrates skilled criteria (04/28/24 1330)  Anticipated Discharge Disposition (SLP): inpatient rehabilitation facility (04/28/24 1330)  Rehab Potential/Prognosis, Swallowing: good, to achieve  stated therapy goals (04/28/24 1330)  Therapy Frequency (Swallow): 5 days per week (04/28/24 1330)  Predicted Duration Therapy Intervention (Days): until discharge (04/28/24 1330)  Oral Care Recommendations: Oral Care BID/PRN, Suction toothbrush (04/28/24 1330)               SWALLOW EVALUATION (Last 72 Hours)       SLP Adult Swallow Evaluation       Row Name 04/28/24 1330 04/28/24 0107       Rehab Evaluation    Document Type evaluation  -CJ re-evaluation  -CJ    Subjective Information no complaints  -CJ no complaints  -CJ    Patient Observations alert;cooperative  -CJ alert;cooperative  -CJ    Patient/Family/Caregiver Comments/Observations spouse/daughter present and RN  -CJ spouse present  -CJ    Patient Effort good  -CJ good  -CJ    Symptoms Noted During/After Treatment none  -CJ none  -CJ       General Information    Patient Profile Reviewed yes  -CJ yes  -CJ    Pertinent History Of Current Problem see am eval; referred for FEES  -CJ Pt adm on 4/22 w/ UTI then subsequent cardiac arrest on 4/25, extubated 4/26. Has been BiPAP dependent; sig h/o DM2, HLD, hypothyroidism, CAD, pacemaker, CHR, CKD, DVT, PAF.  -CJ    Current Method of Nutrition NPO;no current diet order  -CJ NPO;no current diet order  -CJ    Precautions/Limitations, Vision WFL;for purposes of eval  -CJ WFL;for purposes of eval  -CJ    Precautions/Limitations, Hearing WFL;for purposes of eval  -CJ WFL;for purposes of eval  -CJ    Prior Level of Function-Communication unknown  -CJ unknown  -CJ    Prior Level of Function-Swallowing unknown  -CJ unknown  -CJ    Plans/Goals Discussed with patient;family  -CJ patient;family  -CJ    Barriers to Rehab medically complex  -CJ medically complex  -CJ    Patient's Goals for Discharge patient did not state  -CJ patient did not state  -CJ    Family Goals for Discharge patient able to return to PO diet  -CJ --       Pain    Additional Documentation Pain Scale: FACES Pre/Post-Treatment (Group)  -CJ Pain Scale: FACES  Pre/Post-Treatment (Group)  -       Pain Scale: FACES Pre/Post-Treatment    Pain: FACES Scale, Pretreatment 0-->no hurt  -CJ 0-->no hurt  -    Posttreatment Pain Rating 0-->no hurt  -CJ 0-->no hurt  -CJ       Oral Motor Structure and Function    Dentition Assessment -- natural, present and adequate  -    Secretion Management -- requires suctioning to control secretions  -    Mucosal Quality -- dry;sticky  -       Oral Musculature and Cranial Nerve Assessment    Oral Motor General Assessment -- vocal impairment  -    Vocal Impairment, Detail. Cranial Nerve X (Vagus) -- vocal quality abnormality (see comments)  -    Oral Motor, Comment -- decreased vocal intensity; breathy quality  -       General Eating/Swallowing Observations    Respiratory Support Currently in Use -- nasal cannula  -    Eating/Swallowing Skills -- fed by SLP  -    Positioning During Eating -- upright in bed  -    Utensils Used -- spoon;cup;straw  -    Consistencies Trialed -- pureed;ice chips;thin liquids  -       Respiratory    Date of Intubation -- 4/25-4/26  -       Clinical Swallow Eval    Pharyngeal Phase -- suspected pharyngeal impairment  -    Clinical Swallow Evaluation Summary -- Pt noted w/ productive cough of secretions throughout entire evaluation. Increased coughing w/ trials of thin liquids. Will plan to complete FEES this date w/ further recs pending. Continue NPO w/ 2-3 ice chips until FEES today  -       Pharyngeal Phase Concerns    Pharyngeal Phase Concerns -- cough  -       Fiberoptic Endoscopic Evaluation of Swallowing (FEES)    Risks/Benefits Reviewed risks/benefits explained;patient;family;agreed to eval  - --    Nasal Entry right:  - --    Scope serial number/identification [id number]  - --       Anatomy and Physiology    Anatomic Considerations edema;erythema  - --    Velopharyngeal WFL  - --    Base of Tongue symmetrical;range reduced  - --    Epiglottis rests posteriorly  - --     Laryngeal Function Breathing symmetrical  -CJ --    Laryngeal Function Phonation symmetrical  -CJ --    Laryngeal Function to Breath Hold incomplete closure  -CJ --    Secretion Rating Scale (Lucio et al. 1996) 2- secretions initially outside the vestibule but later entered the vestibule  -CJ --    Secretion Description thick;discolored  -CJ --    Ice Chips elicited swallow;partially cleared secretions;aspirated  -CJ --    Spontaneous Swallow frequency reduced  -CJ --    Sensory reduced sensation  -CJ --    Utensils Used Spoon;Cup;Straw  -CJ --    Consistencies Trialed ice chips;thin liquids;nectar-thick liquids;pudding/puree;soft to chew;regular textures;spoon;cup;straw  -CJ --       FEES Interpretation    Oral Phase prolonged manipulation;prespill of liquids into pharynx  -CJ --       Initiation of Pharyngeal Swallow    Initiation of Pharyngeal Swallow bolus in pyriform sinuses  -CJ --    Pharyngeal Phase impaired pharyngeal phase of swallowing  -CJ --    Penetration Before the Swallow thin liquids;secondary to reduced back of tongue control;secondary to delayed swallow initiation or mistiming  -CJ --    Aspiration During the Swallow thin liquids;secondary to delayed swallow initiation or mistiming;secondary to reduced laryngeal elevation;secondary to reduced vestibular closure;nectar-thick liquids  nectar via straw only  -CJ --    Aspiration After the Swallow thin liquids;secondary to residue;in pyriform sinuses  -CJ --    Depth of Penetration deep  -CJ --    Response to Penetration No  -CJ --    No spontaneous response to penetration and non-effective laryngeal clearance with cue (see comments)  -CJ --    Response to Aspiration No  -CJ --    No spontaneous response to aspiration with non-effective subglottic clearance with cue (see comments)  cued weak cough  -CJ --    Rosenbek's Scale thin:;8-->Level 8  -CJ --    Residue thin liquids;nectar-thick liquids;diffuse within pharynx;secondary to reduced posterior  pharyngeal wall stripping;secondary to reduced laryngeal elevation;secondary to reduced hyolaryngeal excursion  worse w/thins and nectar via straw  -CJ --    Response to Residue unable to clear residue;with cued swallow  -CJ --    Attempted Compensatory Maneuvers bolus presentation style;bolus size;additional subsequent swallow;multiple swallows;throat clear after swallow  -CJ --    Response to Attempted Compensatory Maneuvers prevented aspiration  -CJ --    Successful Compensatory Maneuver Competency patient unable to adequately demonstrate/teach back compensations  -CJ --    FEES Summary Moderate pharyngeal dysphagia. Suspect mentation/timing/diffuse weakness impacting. Penetration w/ thin liquids before the swallow w/ subsequent silent aspiration during the swallow. Cued cough ineffective to clear. Silent aspiration w/ nectar via straw only during the swallow. Pt continued to silently aspirate thin residue after the swallow 2/2 inability to clear. No penetration or aspiration w/ puree/solid/nectar via spoon/cup. Okay for regular diet (as tolerated, may have to downgrade per pt's mentation) w/ nectar thick liquids, no straws, no mixed consistencies. Meds whole/crushed in puree/pudding.  -CJ --       SLP Evaluation Clinical Impression    SLP Swallowing Diagnosis moderate;pharyngeal dysphagia  -CJ suspected pharyngeal dysphagia  -CJ    Functional Impact risk of aspiration/pneumonia  -CJ risk of aspiration/pneumonia  -    Rehab Potential/Prognosis, Swallowing good, to achieve stated therapy goals  -CJ good, to achieve stated therapy goals  -    Swallow Criteria for Skilled Therapeutic Interventions Met demonstrates skilled criteria  -CJ demonstrates skilled criteria  -CJ       Recommendations    Therapy Frequency (Swallow) 5 days per week  -CJ --    Predicted Duration Therapy Intervention (Days) until discharge  -CJ until discharge  -CJ    SLP Diet Recommendation regular textures;no mixed consistencies;nectar  thick liquids  no straws  - NPO  2-3 ice chips  -    Recommended Diagnostics --  ? SLC eval pending mentation  -CJ reassess via FEES  -    Recommended Precautions and Strategies upright posture during/after eating;small bites of food and sips of liquid;no straw;multiple swallows per bite of food;multiple swallows per sip of liquid;general aspiration precautions;assist with feeding  - general aspiration precautions  -    Oral Care Recommendations Oral Care BID/PRN;Suction toothbrush  -CJ Oral Care BID/PRN;Suction toothbrush  -CJ    SLP Rec. for Method of Medication Administration meds whole;meds crushed;with puree;as tolerated  -CJ meds via alternate route  -CJ    Monitor for Signs of Aspiration yes;notify SLP if any concerns  -CJ yes;notify SLP if any concerns  -CJ    Anticipated Discharge Disposition (SLP) inpatient rehabilitation facility  - inpatient rehabilitation facility  -              User Key  (r) = Recorded By, (t) = Taken By, (c) = Cosigned By      Initials Name Effective Dates    Coty Ledesma, MS CCC-SLP 07/11/23 -                     EDUCATION  The patient has been educated in the following areas:   Dysphagia (Swallowing Impairment) Oral Care/Hydration Modified Diet Instruction.        SLP GOALS       Row Name 04/28/24 1330             (LTG) Patient will demonstrate functional swallow for    Diet Texture (Demonstrate functional swallow) regular textures  -CJ      Liquid viscosity (Demonstrate functional swallow) thin liquids  -CJ      [place] (Demonstrate functional swallow) with minimal cues (75-90% accuracy)  -CJ      Time Frame (Demonstrate functional swallow) by discharge  -CJ      Progress/Outcomes (Demonstrate functional swallow) new goal  -CJ         (STG) Patient will tolerate trials of    Consistencies Trialed (Tolerate trials) regular textures;nectar/ mildly thick liquids  -CJ      Desired Outcome (Tolerate trials) without signs/symptoms of aspiration;without signs of  distress;with adequate oral prep/transit/clearance  -CJ      [place] (Tolerate trials) with minimal cues (75-90% accuracy)  -CJ      Time Frame (Tolerate trials) 1 week  -CJ      Progress/Outcomes (Tolerate trials) new goal  -CJ         (STG) Lingual Strengthening Goal 1 (SLP)    Activity (Lingual Strengthening Goal 1, SLP) increase tongue back strength  -CJ      Increase Tongue Back Strength lingual resistance exercises  -CJ      [place]/Accuracy (Lingual Strengthening Goal 1, SLP) with moderate cues (50-74% accuracy)  -CJ      Time Frame (Lingual Strengthening Goal 1, SLP) 1 week  -CJ      Progress/Outcomes (Lingual Strengthening Goal 1, SLP) new goal  -CJ         (STG) Pharyngeal Strengthening Exercise Goal 1 (SLP)    Activity (Pharyngeal Strengthening Goal 1, SLP) increase tongue base retraction;increase squeeze/positive pressure generation;increase closure at entrance to airway/closure of airway at glottis;increase superior movement of the hyolaryngeal complex;increase anterior movement of the hyolaryngeal complex  -CJ      Increase Superior Movement of the Hyolaryngeal Complex effortful pitch glide (falsetto + pharyngeal squeeze)  -CJ      Increase Anterior Movement of the Hyolaryngeal Complex EMST  -CJ      Increase Closure at Entrance to Airway/Closure of Airway at Glottis supraglottic swallow;breath hold exercises  -CJ      Increase Squeeze/Positive Pressure Generation hard effortful swallow  -CJ      Increase Tongue Base Retraction micha  -CJ      [place]/Accuracy (Pharyngeal Strengthening Goal 1, SLP) with moderate cues (50-74% accuracy)  -CJ      Time Frame (Pharyngeal Strengthening Goal 1, SLP) 1 week  -CJ      Progress/Outcomes (Pharyngeal Strengthening Goal 1, SLP) new goal  -CJ                User Key  (r) = Recorded By, (t) = Taken By, (c) = Cosigned By      Initials Name Provider Type    Coty Ledesma MS CCC-SLP Speech and Language Pathologist                       Time  Calculation:    Time Calculation- SLP       Row Name 04/28/24 1535 04/28/24 1121          Time Calculation- SLP    SLP Start Time 1330  - 0930  -     SLP Received On 04/28/24  - 04/28/24  -        Untimed Charges    61015-FI Eval Oral Pharyng Swallow Minutes -- 40  -CJ     45836-HN Fiberoptic Endo Eval Swallow Minutes 85  -CJ --        Total Minutes    Untimed Charges Total Minutes 85  -CJ 40  -CJ      Total Minutes 85  -CJ 40  -CJ               User Key  (r) = Recorded By, (t) = Taken By, (c) = Cosigned By      Initials Name Provider Type     Coty Blanchard, MS CCC-SLP Speech and Language Pathologist                    Therapy Charges for Today       Code Description Service Date Service Provider Modifiers Qty    69775266390 HC ST EVAL ORAL PHARYNG SWALLOW 3 4/28/2024 Coty Blanchard, MS CCC-SLP GN 1    [id number] HC ST FIBEROPTIC ENDO EVAL SWALL 6 4/28/2024 Coty Blanchard MS CCC-SLP GN 1                 Coty Blanchard MS CCC-SLP  4/28/2024    Electronically signed by Coty Blanchard MS CCC-SLP at 04/28/24 1537

## 2024-04-30 NOTE — PROGRESS NOTES
Intensive Care Follow-up     Hospital:  LOS: 7 days   Mr. Jermaine Singh, 79 y.o. male is followed for:   Cardiac arrest            History of present illness:    78yo M with a history of T2DM, Stage 3 CKD, bilateral DVTs, paroxysmal Afib, SSS s/p post-pacemaker placement, CHF, PVD, and left diabetic foot infection who presented to [facility] on 4/22/24 with weakness and a chronic wound on his RLE. UA was consistent with a UTI. He was started on Rocephin and admitted to Hospital Medicine.      On 4/25/24, he suffered a Vfib arrest and was intubated during CPR. He was transferred to the ICU. He was able to be extubated on 4/26/24. After extubation, his respiratory status was tenuous and family decided to make him a DNR/DNI.  Patient was seen by infectious disease and on antibiotics.  Also has been requiring norepinephrine at low-dose.  Midodrine has been started.  Patient also developed shock liver which seems to be recovering.    Subjective   Interval History:  Overnight no acute issues.  Seen sitting out in chair today.  Denies any chest pain or shortness of breath.  Remains on low-dose norepinephrine at 0.02 mics.  More conversant today.  Cortisol level checked and does not suggest adrenal insufficiency.  Plan is for repeat echocardiogram today             The patient's past medical, surgical and social history were reviewed and updated in Epic as appropriate.       Objective     Infusions:  norepinephrine, 0.02-0.3 mcg/kg/min, Last Rate: 0.03 mcg/kg/min (04/30/24 1038)      Medications:  acetylcysteine, 3 mL, Nebulization, BID - RT  [Held by provider] apixaban, 5 mg, Oral, Q12H  aspirin, 81 mg, Oral, Daily  atorvastatin, 20 mg, Oral, Nightly  azelastine, 2 spray, Each Nare, BID  castor oil-balsam peru, 1 Application, Topical, Q12H  heparin (porcine), 5,000 Units, Subcutaneous, Q8H  insulin glargine, 8 Units, Subcutaneous, Nightly  Insulin Lispro, 2-9 Units, Subcutaneous, 4x Daily AC & at Bedtime  lactobacillus  "acidophilus, 1 capsule, Oral, Daily  levothyroxine, 88 mcg, Oral, Q AM  midodrine, 10 mg, Oral, TID AC  pantoprazole, 40 mg, Oral, Q AM  piperacillin-tazobactam, 3.375 g, Intravenous, Q8H  polyethylene glycol, 17 g, Oral, Daily  senna-docusate sodium, 2 tablet, Oral, BID  sodium chloride, 10 mL, Intravenous, Q12H  tamsulosin, 0.4 mg, Oral, Daily        Vital Sign Min/Max for last 24 hours  Temp  Min: 97 °F (36.1 °C)  Max: 97.9 °F (36.6 °C)   BP  Min: 79/53  Max: 125/109   Pulse  Min: 59  Max: 75   Resp  Min: 16  Max: 20   SpO2  Min: 92 %  Max: 100 %   No data recorded       Input/Output for last 24 hour shift  04/29 0701 - 04/30 0700  In: 2439.7 [P.O.:1440; I.V.:889.7]  Out: 535 [Urine:535]      Objective:  Vital signs: (most recent): Blood pressure 98/64, pulse 65, temperature 97 °F (36.1 °C), temperature source Bladder, resp. rate 16, height 190 cm (74.8\"), weight 87.9 kg (193 lb 12.6 oz), SpO2 97%.            General Appearance: Awake, alert, in no acute distress  Lungs:   B/L Breath sounds present with decreased breath sounds on bases, no wheezing heard, no crackles.   Heart: S1 and S2 present, no murmur  Abdomen: Soft, nontender, no guarding or rigidity, bowel sounds positive.  Extremities:   no edema, warm to touch.  Right lower extremity dressing intact  Neurologic:  Moving all four extremities. Good strength bilaterally.      Results from last 7 days   Lab Units 04/30/24  0447 04/29/24  0455 04/28/24  0405   WBC 10*3/mm3 9.12 9.28 10.11   HEMOGLOBIN g/dL 8.8* 7.9* 8.0*   PLATELETS 10*3/mm3 232 205 183     Results from last 7 days   Lab Units 04/30/24  0447 04/29/24  0455 04/28/24  0405 04/27/24  0607 04/26/24  0410   SODIUM mmol/L 140 142 141 142 137   POTASSIUM mmol/L 4.3 4.0 4.5 4.7 4.8   CO2 mmol/L 17.0* 19.0* 17.0* 18.0* 16.0*   BUN mg/dL 82* 87* 84* 82* 78*   CREATININE mg/dL 2.68* 2.52* 2.65* 2.37* 2.28*   MAGNESIUM mg/dL 2.8*  --   --  2.9* 2.9*   PHOSPHORUS mg/dL 4.5  --   --   --   --    GLUCOSE " mg/dL 162* 170* 157* 142* 188*     Estimated Creatinine Clearance: 27.8 mL/min (A) (by C-G formula based on SCr of 2.68 mg/dL (H)).    Results from last 7 days   Lab Units 04/26/24  0313   PH, ARTERIAL pH units 7.441   PCO2, ARTERIAL mm Hg 24.6*   PO2 ART mm Hg 118.0*       Images:   None new    I reviewed the patient's results and images.     Assessment & Plan   Impression        Cardiac arrest    Type 2 diabetes mellitus with hyperglycemia    Hyperlipidemia LDL goal <70    Hypothyroidism (acquired)    Coronary artery disease involving native coronary artery of native heart with angina pectoris    Pacemaker    HFrEF (heart failure with reduced ejection fraction)    Anemia, chronic disease    Stage 3b chronic kidney disease    UTI (urinary tract infection)    Acute respiratory failure with hypoxia    Elevated troponin level not due myocardial infarction       Plan        1.  Patient s/p cardiac arrest complicated by shock liver and acute on chronic renal failure.  Patient seems to be recovering.  Has severe cardiomyopathy with ejection fraction less than 20%.  Cardiology team is following.  Continue aspirin, statin.  Plan is to repeat echocardiogram.  2.  Hemodynamically unstable still requiring norepinephrine.  Possible stunned myocardium postcardiac arrest versus severe cardiomyopathy.  Continue monitoring closely.  Started on high-dose midodrine and.  Tolerating well.  Cortisol level checked and does not suggest adrenal insufficiency.  Will need further goal-directed management of his heart failure once hemodynamically more stable.  3.  Continue antibiotics per ID team.  4.   Oral intake improving per patient and family.  5.  Continue current insulin regimen for hyperglycemia management.  Will make further adjustments as needed  6.  Eliquis has been on hold due to ongoing renal and liver dysfunction.  Continue subcu heparin for DVT prophylaxis as patient will be high risk for DVT.  7.  GI prophylaxis.  8.  Out of  bed and mobilize.  9.  Respiratory status seems to be stabilizing.  Continue Mucomyst neb for another 24 hours and we will evaluate again in the morning.    Continue close monitoring in ICU as remains at risk of decline.    Plan of care and goals reviewed with multidisciplinary/antibiotic stewardship team during rounds.   I discussed the patient's findings and my recommendations with patient, family, and nursing staff     Time spent Critical care 30 min (exclusive of procedure time)  including high complexity decision making to assess, manipulate, and support vital organ system failure in this individual who has impairment of one or more vital organ systems such that there is a high probability of imminent or life threatening deterioration in the patient’s condition.      Antonio Angeles MD, FCCP  Pulmonary, Critical care and Sleep Medicine

## 2024-04-30 NOTE — PROGRESS NOTES
" [facility] - Progress Note    Jermaine Singh  1945  N205/1    04/30/24, 08:47 EDT      Chief Complaint: Following for cardiac arrest    Subjective:   Remains on RA.  Still requiring low dose pressor support.  Confused at times but improving.  Visual hallucinations reported.    No further ventricular arrhythmia.      Review of Systems:  Pertinent positives are listed above and in physical exam.  All others have been reviewed and are negative.    acetylcysteine, 3 mL, Nebulization, BID - RT  [Held by provider] apixaban, 5 mg, Oral, Q12H  aspirin, 81 mg, Oral, Daily  atorvastatin, 20 mg, Oral, Nightly  azelastine, 2 spray, Each Nare, BID  castor oil-balsam peru, 1 Application, Topical, Q12H  heparin (porcine), 5,000 Units, Subcutaneous, Q8H  insulin glargine, 8 Units, Subcutaneous, Nightly  Insulin Lispro, 2-9 Units, Subcutaneous, 4x Daily AC & at Bedtime  lactobacillus acidophilus, 1 capsule, Oral, Daily  levothyroxine, 88 mcg, Oral, Q AM  midodrine, 10 mg, Oral, TID AC  pantoprazole, 40 mg, Oral, Q AM  piperacillin-tazobactam, 3.375 g, Intravenous, Q8H  polyethylene glycol, 17 g, Oral, Daily  senna-docusate sodium, 2 tablet, Oral, BID  sodium chloride, 10 mL, Intravenous, Q12H  tamsulosin, 0.4 mg, Oral, Daily        Objective:  Vitals:   height is 190 cm (74.8\") and weight is 87.9 kg (193 lb 12.6 oz). His bladder temperature is 97.7 °F (36.5 °C). His blood pressure is 112/73 and his pulse is 59. His respiration is 16 and oxygen saturation is 96%.     Intake/Output Summary (Last 24 hours) at 4/30/2024 0847  Last data filed at 4/30/2024 0628  Gross per 24 hour   Intake 2001.89 ml   Output 475 ml   Net 1526.89 ml       Physical Exam:  General:  WN, Appears stated age  CV:  Normal S1,S2. No murmur, rub, or gallop. + Pacemaker  Resp:  CTA S[place], equal, nonlabored.  Abd:  Soft, + BS, no organomegaly. Nontender to palpation.  Extrem:  RLE with wrap, trace LLE.           Results from last 7 days   Lab " "Units 04/30/24  0447   WBC 10*3/mm3 9.12   HEMOGLOBIN g/dL 8.8*   HEMATOCRIT % 26.7*   PLATELETS 10*3/mm3 232     Results from last 7 days   Lab Units 04/30/24  0447   SODIUM mmol/L 140   POTASSIUM mmol/L 4.3   CHLORIDE mmol/L 110*   CO2 mmol/L 17.0*   BUN mg/dL 82*   CREATININE mg/dL 2.68*   CALCIUM mg/dL 7.8*   BILIRUBIN mg/dL 0.6   ALK PHOS U/L 85   ALT (SGPT) U/L 218*   AST (SGOT) U/L 22   GLUCOSE mg/dL 162*                         Tele: V paced      Assessment/Plan:  Cardiac arrest/VF arrest  Substantial substrate for VF arrest including ischemic heart disease with severely diffusely diseased LAD as well as newly identified severe LV dysfunction with EF <20%  Films reviewed by cards 4/25; \"Based on prebypass angiogram, I do not suspect readily \"intervenable\" lesions to correct ischemic substrate.  LAD is severely diffusely diseased and would not be suitable for percutaneous treatment.  Frankly, I am impressed that Dr. Au was able to bypass the LAD in 2019.\"  Recommend against emergent cardiac catheterization given his hemodynamic instability and unlikelihood of \"targets\" to correct ischemic substrate  Continue aspirin and statin  No beta-blocker due to hypotension, resume as pressor support weaned/BP tolerates  Discontinue IV amiodarone.  If recurrence of VT/VF, start lidocaine  Repeat echo     Severe LV dysfunction with EF <20%  Newly diagnosed.  Last LVEF 48% in 2022  Unclear whether this is related to stunning from VF arrest or whether this has been pre-existing his admission.  I suspect the latter as patient has had dwindling functional capacity  Presently with some element of cardiogenic shock which prohibits GDMT  SJ pacemaker interrogation reviewed  Repeat echo     Multifactorial shock  Cardiogenic, possibly septic  Continue norepinephrine his primary pressor, epinephrine is secondary pressor  Wean vasopressors to maintain MAP >65 mmHg     Elevated troponin, POA  Initial troponin measurements on " admission elevated but flat and not consistent with myocardial infarction  If troponin is repeated, I suspect will be elevated but difficult to determine whether related to a type I MI or type II MI due to fixed CAD, CHF, and arrhythmia     Type 2 diabetes mellitus not on insulin with hyperglycemia  Uncontrolled (untreated?) diabetes prior to admission  Has only not candidate for SGL 2 inhibitor due to renal insufficiency  Start Glucomander protocol     Hyperlipidemia with goal LDL <70  LDL well-controlled  Continue to hold statin      Stage IIIb CKD  Hold losartan due to hypotension and anticipated [place] from cardiac event     History of DVT  Hold apixaban due to worsening renal insufficiency and possibly developing coagulopathy due to shock liver     Paroxysmal Atrial Fibrillation  [place] device interrogated Friday, reviewed.  Report on  chart.  Hold NOAC with shock liver/worsening renal function.       I discussed the patient's findings and my recommendations with the patient, any present family members, and the nursing staff.  Cooper Apodaca MD saw and examined patient, verified hx and PE, read all radiographic studies, reviewed labs and micro data, and formulated dx, plan for treatment and all medical decision making.      Megan Shukla PA-C  04/30/24, 08:49 EDT

## 2024-04-30 NOTE — PLAN OF CARE
Goal Outcome Evaluation:  Plan of Care Reviewed With: patient, spouse        Progress: improving  Outcome Evaluation: Pt presents w/ dizziness, increased pain, decreased functional endurance, generalized weakness, and balance deficits limiting his ADL independence. Will continue to progress pt as tolerated per OT POC. Rec IRF at d/c.      Anticipated Discharge Disposition (OT): inpatient rehabilitation facility

## 2024-04-30 NOTE — PLAN OF CARE
Goal Outcome Evaluation:  Plan of Care Reviewed With: patient, spouse        Progress: improving  Outcome Evaluation: Pt demonstrating improvements this date by performing STS and SPT bed>chair w/ maxA x2 and B UE support. Pt continues to present below baseline function d/t dizziness, pain, generalized weakness, decreased balance, and decreased activity tolerance. Pt would cont to benefit from skilled IP PT. Rec IRF at d/c.      Anticipated Discharge Disposition (PT): inpatient rehabilitation facility

## 2024-04-30 NOTE — THERAPY TREATMENT NOTE
Patient Name: Jermaine Singh  : 1945    MRN: [id number]                              Today's Date: 2024       Admit Date: 2024    Visit Dx:     ICD-10-CM ICD-9-CM   1. Acute UTI  N39.0 599.0   2. Generalized weakness  R53.1 780.79   3. Edema of right lower extremity  R60.0 782.3   4. Leg erythema  L53.9 695.9   5. Chronic kidney disease, unspecified CKD stage  N18.9 585.9   6. Elevated troponin  R79.89 790.6   7. Hyperkalemia  E87.5 276.7   8. Cardiac arrest  I46.9 427.5   9. Oropharyngeal dysphagia  R13.12 787.22     Patient Active Problem List   Diagnosis    Type 2 diabetes mellitus with hyperglycemia    Hyperlipidemia LDL goal <70    Hypothyroidism (acquired)    Vitamin D deficiency on Rx     Diabetic neuropathy    Coronary artery disease involving native coronary artery of native heart with angina pectoris    Atherosclerosis of native artery of both lower extremities with intermittent claudication    Pacemaker    HFrEF (heart failure with reduced ejection fraction)    DVT, bilateral lower limbs    Cellulitis of right lower extremity    Anemia, chronic disease    PVD (peripheral vascular disease)    Diabetic foot ulcer    GERD without esophagitis    Stage 3b chronic kidney disease    Right inguinal hernia    PAF (paroxysmal atrial fibrillation)    Moderate malnutrition    UTI (urinary tract infection)    Cardiac arrest    Acute respiratory failure with hypoxia    History of DVT (deep vein thrombosis)    Elevated troponin level not due myocardial infarction     Past Medical History:   Diagnosis Date    Allergic     Arthritis     Asthma     Coronary artery disease     Diabetes mellitus 2000    started on [place] 2018; started on po meds in ; checking blood sugars daily     Diabetic foot infection 2023    Disease of thyroid gland     po meds daily for hypothyroidism     Elevated serum creatinine 11/15/2022    Elevated troponin 10/02/2022    Generalized weakness 11/15/2022     History of fracture as a child     rt leg- severe     Hyperlipidemia     Hypertension     Hypothyroidism     PAF (paroxysmal atrial fibrillation) 07/23/2023    Peripheral neuropathy     Peripheral vascular disease     s/p angiogram 2/19-needs stent in left leg     Pleural effusion, bilateral 11/15/2022    Proximal phalanx fracture of the second digit extending into the second metatarsal joint 07/28/2022    RBBB     Right knee pain     Status post amputation of great toe and second toe of left foot 07/23/2023    Status post amputation of great toe, left 11/15/2022    Unstable angina 02/12/2019    Added automatically from request for surgery [id number]    Vitamin D deficiency      Past Surgical History:   Procedure Laterality Date    AMPUTATION DIGIT Left 01/17/2023    Procedure: AMPUTATION DIGIT LEFT;  Surgeon: Gopal Márquez MD;  Location:  [facility] OR;  Service: Vascular;  Laterality: Left;    APPENDECTOMY      CARDIAC CATHETERIZATION N/A 02/14/2019    Procedure: Left Heart Cath;  Surgeon: Cooper Apodaca MD;  Location: [facility] CATH INVASIVE LOCATION;  Service: Cardiology    CARDIAC ELECTROPHYSIOLOGY PROCEDURE N/A 05/18/2022    Procedure: DEVICE IMPLANT;  Surgeon: Cooper Apodaca MD;  Location: [facility] CATH INVASIVE LOCATION;  Service: Cardiology;  Laterality: N/A;    CARDIAC SURGERY      COLONOSCOPY      CORONARY ARTERY BYPASS GRAFT N/A 03/22/2019    Procedure: MEDIAN STERNOTOMY, CORONARY ARTERY BYPASS GRAFT X3, UTILIZING THE LEFT INTERNAL MAMMARY ARTERY, EVH AND OPEN HARVEST OF THE RIGHT GREATER SAPHENOUS VEIN, EXPLORATION OF THE LEFT LEG;  Surgeon: Ap Au MD;  Location: [facility] OR;  Service: Cardiothoracic    EYE SURGERY Bilateral     cataracts     FRACTURE SURGERY      INTERVENTIONAL RADIOLOGY PROCEDURE N/A 07/29/2021    Procedure: Abdominal Aortagram with Runoff;  Surgeon: Jermaine Hernandez MD;  Location: [facility] CATH INVASIVE LOCATION;  Service: Cardiovascular;  Laterality: N/A;    KNEE  ARTHROSCOPY      right x 2, left x 1    LACERATION REPAIR      right leg    LEG SURGERY      2 for fracture of rt leg     TONSILLECTOMY      Adnoidectomy    TRANS METATARSAL AMPUTATION Left 08/02/2022    Procedure: GREAT TOE AMPUTATION LEFT;  Surgeon: Gopal Márquez MD;  Location: [facility];  Service: Vascular;  Laterality: Left;      General Information       Row Name 04/30/24 1145          Physical Therapy Time and Intention    Document Type therapy note (daily note)  -     Mode of Treatment physical therapy  -       Row Name 04/30/24 1145          General Information    Patient Profile Reviewed yes  -     Existing Precautions/Restrictions fall;cardiac;oxygen therapy device and L/min;orthostatic hypotension;other (see comments)  ART line, RLE wounds, orthostatic hypotension, monitor BP  -     Barriers to Rehab medically complex;previous functional deficit  -       Row Name 04/30/24 1145          Cognition    Orientation Status (Cognition) oriented x 3  -       Row Name 04/30/24 1145          Safety Issues, Functional Mobility    Safety Issues Affecting Function (Mobility) awareness of need for assistance;insight into deficits/self-awareness;safety precaution awareness;safety precautions follow-through/compliance;sequencing abilities  -     Impairments Affecting Function (Mobility) balance;endurance/activity tolerance;strength;postural/trunk control;pain;motor control;motor planning;shortness of breath  -               User Key  (r) = Recorded By, (t) = Taken By, (c) = Cosigned By      Initials Name Provider Type     Bessie Servin PT Physical Therapist                   Mobility       Row Name 04/30/24 1146          Bed Mobility    Comment, (Bed Mobility) EOB w/ OT upon arrival  -       Row Name 04/30/24 1146          Transfers    Comment, (Transfers) VCs for hand placement and sequencing. BP measured after transfer and increased to 100/70 reclined w/ BLEs elevated  -       Row Name  04/30/24 1146          Bed-Chair Transfer    Bed-Chair [place] (Transfers) maximum assist (25% patient effort);2 person assist;verbal cues  -     Assistive Device (Bed-Chair Transfers) other (see comments)  B UE support  -     Comment, (Bed-Chair Transfer) SPT bed>chair. Cues for upright posture  -Critical access hospital Name 04/30/24 1146          Sit-Stand Transfer    Sit-Stand [place] (Transfers) maximum assist (25% patient effort);2 person assist;verbal cues  -     Assistive Device (Sit-Stand Transfers) other (see comments)  B UE support  -     Comment, (Sit-Stand Transfer) STS from EOB. Cues for upright posture  -Critical access hospital Name 04/30/24 1146          Gait/Stairs (Locomotion)    [place] Level (Gait) unable to assess  -     Comment, (Gait/Stairs) Deferred d/t weakness and instability during transfer  -               User Key  (r) = Recorded By, (t) = Taken By, (c) = Cosigned By      Initials Name Provider Type     Bessie Srevin PT Physical Therapist                   Obj/Interventions       [facility] Name 04/30/24 1149          Motor Skills    Therapeutic Exercise knee;ankle  -LH       Row Name 04/30/24 1149          Knee (Therapeutic Exercise)    Knee (Therapeutic Exercise) strengthening exercise  -     Knee Strengthening (Therapeutic Exercise) bilateral;heel slides;10 repetitions  -LH       Row Name 04/30/24 1149          Ankle (Therapeutic Exercise)    Ankle (Therapeutic Exercise) AROM (active range of motion)  -     Ankle AROM (Therapeutic Exercise) bilateral;dorsiflexion;plantarflexion;10 repetitions  -LH       Row Name 04/30/24 1149          Balance    Balance Assessment sitting static balance;sitting dynamic balance;sit to stand dynamic balance;standing static balance;standing dynamic balance  -     Static Sitting Balance minimal assist  -     Dynamic Sitting Balance moderate assist  -     Position, Sitting Balance unsupported;sitting edge of bed;sitting in chair  -     Sit to  Stand Dynamic Balance maximum assist;2-person assist;verbal cues  -     Static Standing Balance maximum assist;2-person assist;verbal cues  -     Dynamic Standing Balance maximum assist;2-person assist;verbal cues  [facility]     Position/Device Used, Standing Balance supported  -     Balance Interventions sitting;standing;sit to stand;supported;static;dynamic  -               User Key  (r) = Recorded By, (t) = Taken By, (c) = Cosigned By      Initials Name Provider Type     Bessie Servin, PT Physical Therapist                   Goals/Plan    No documentation.                  Clinical Impression       Row Name 04/30/24 1150          Pain    Pain Intervention(s) Repositioned;Ambulation/increased activity  -     Additional Documentation Pain Scale: FACES Pre/Post-Treatment (Group)  -[facility] Name 04/30/24 1150          Pain Scale: FACES Pre/Post-Treatment    Pain: FACES Scale, Pretreatment 8-->hurts whole lot  -     Posttreatment Pain Rating 8-->hurts whole lot  -LH     Pain Location - Side/Orientation Right  -     Pain Location upper  -     Pain Location - abdomen  -[facility] Name 04/30/24 1150          Plan of Care Review    Plan of Care Reviewed With patient;spouse  -     Progress improving  -     Outcome Evaluation Pt demonstrating improvements this date by performing STS and SPT bed>chair w/ maxA x2 and B UE support. Pt continues to present below baseline function d/t dizziness, pain, generalized weakness, decreased balance, and decreased activity tolerance. Pt would cont to benefit from skilled IP PT. Rec IRF at d/c.  -       Row Name 04/30/24 1150          Vital Signs    Pre Systolic BP Rehab 103  -LH     Pre Treatment Diastolic BP 66  -     Intra Systolic BP Rehab 81   EOB  -     Intra Treatment Diastolic BP 62  -LH     Post Systolic BP Rehab 100  reclined w/ B LEs elevated  -LH     Post Treatment Diastolic BP 70  -     Pretreatment Heart Rate (beats/min) 60  -LH      Posttreatment Heart Rate (beats/min) 61  -     Pre SpO2 (%) 95  -LH     O2 Delivery Pre Treatment room air  -     O2 Delivery Intra Treatment room air  -     Post SpO2 (%) 99  -     O2 Delivery Post Treatment room air  -LH     Pre Patient Position Sitting  -     Intra Patient Position Standing  -     Post Patient Position Sitting  -       Row Name 04/30/24 1150          Positioning and Restraints    Pre-Treatment Position in bed  -     Post Treatment Position chair  -     In Chair notified nsg;reclined;sitting;call light within reach;encouraged to call for assist;exit alarm on;with family/caregiver;waffle cushion;on mechanical lift sling;legs elevated  -               User Key  (r) = Recorded By, (t) = Taken By, (c) = Cosigned By      Initials Name Provider Type     Bessie Servin PT Physical Therapist                   Outcome Measures       Row Name 04/30/24 1153          How much help from another person do you currently need...    Turning from your back to your side while in flat bed without using bedrails? 2  -LH     Moving from lying on back to sitting on the side of a flat bed without bedrails? 2  -LH     Moving to and from a bed to a chair (including a wheelchair)? 2  -LH     Standing up from a chair using your arms (e.g., wheelchair, bedside chair)? 2  -LH     Climbing 3-5 steps with a railing? 1  -LH     To walk in hospital room? 1  -     AM-PAC 6 Clicks Score (PT) 10  -     Highest Level of Mobility Goal 4 --> Transfer to chair/commode  -       Row Name 04/30/24 1153 04/30/24 1006       Functional Assessment    Outcome Measure Options AM-PAC 6 Clicks Basic Mobility (PT)  - AM-PAC 6 Clicks Daily Activity (OT)  -              User Key  (r) = Recorded By, (t) = Taken By, (c) = Cosigned By      Initials Name Provider Type    Stacy Mcdonnell OT Occupational Therapist     Bessie Servin PT Physical Therapist                                 Physical Therapy Education        Title: PT OT SLP Therapies (In Progress)       Topic: Physical Therapy (In Progress)       Point: Mobility training (In Progress)       Learning Progress Summary             Patient Acceptance, E, NR by  at 4/30/2024 1153    Acceptance, E, NR by  at 4/29/2024 1159    Eager, E, VU,DU,NR by SS at 4/24/2024 1452    Comment: Reviewed safety/technique w/transfers, ambulation, HEP, PT POC    Acceptance, E, VU by KR at 4/23/2024 1120   Family Acceptance, E, NR by  at 4/30/2024 1153    Acceptance, E, NR by  at 4/29/2024 1159    Acceptance, E, VU by KR at 4/23/2024 1120                         Point: Home exercise program (In Progress)       Learning Progress Summary             Patient Acceptance, E, NR by  at 4/30/2024 1153    Eager, E, VU,DU,NR by SS at 4/24/2024 1452    Comment: Reviewed safety/technique w/transfers, ambulation, HEP, PT POC   Family Acceptance, E, NR by  at 4/30/2024 1153                         Point: Body mechanics (In Progress)       Learning Progress Summary             Patient Acceptance, E, NR by  at 4/30/2024 1153    Acceptance, E, NR by  at 4/29/2024 1159    Eager, E, VU,DU,NR by  at 4/24/2024 1452    Comment: Reviewed safety/technique w/transfers, ambulation, HEP, PT POC    Acceptance, E, VU by KR at 4/23/2024 1120   Family Acceptance, E, NR by  at 4/30/2024 1153    Acceptance, E, NR by  at 4/29/2024 1159    Acceptance, E, VU by KR at 4/23/2024 1120                         Point: Precautions (In Progress)       Learning Progress Summary             Patient Acceptance, E, NR by  at 4/30/2024 1153    Acceptance, E, NR by  at 4/29/2024 1159    Eager, E, VU,DU,NR by SS at 4/24/2024 1452    Comment: Reviewed safety/technique w/transfers, ambulation, HEP, PT POC    Acceptance, E, VU by KR at 4/23/2024 1120   Family Acceptance, E, NR by LH at 4/30/2024 1153    Acceptance, E, NR by LH at 4/29/2024 1159    Acceptance, E, VU by KR at 4/23/2024 1120                                          User Key       Initials Effective Dates Name Provider Type Discipline    SS 06/01/21 -  Jenna Stanley, PT Physical Therapist PT     12/30/22 -  Tiffanie Erwin, PT Physical Therapist PT     09/21/23 -  Bessie Servin, PT Physical Therapist PT                  PT Recommendation and Plan  Planned Therapy Interventions (PT): balance training, bed mobility training, gait training, home exercise program, neuromuscular re-education, patient/family education, postural re-education, ROM (range of motion), strengthening, stretching, stair training, transfer training  Plan of Care Reviewed With: patient, spouse  Progress: improving  Outcome Evaluation: Pt demonstrating improvements this date by performing STS and SPT bed>chair w/ maxA x2 and B UE support. Pt continues to present below baseline function d/t dizziness, pain, generalized weakness, decreased balance, and decreased activity tolerance. Pt would cont to benefit from skilled IP PT. Rec IRF at d/c.     Time Calculation:         PT Charges       Row Name 04/30/24 1153             Time Calculation    Start Time 0852  -      PT Received On 04/30/24  -      PT Goal Re-Cert Due Date 05/09/24  -         Timed Charges    88363 - PT Therapeutic Exercise Minutes 5  -LH      06066 - PT Therapeutic Activity Minutes 5  -         Total Minutes    Timed Charges Total Minutes 10  -       Total Minutes 10  -LH                User Key  (r) = Recorded By, (t) = Taken By, (c) = Cosigned By      Initials Name Provider Type     Bessie Servin, PT Physical Therapist                  Therapy Charges for Today       Code Description Service Date Service Provider Modifiers Qty    26330756391 HC PT THERAPEUTIC ACT EA 15 MIN 4/29/2024 Bsesie Servin, PT GP 1    86549659935 HC PT RE-EVAL ESTABLISHED PLAN 2 4/29/2024 Bessie Servin, PT GP 1    14003930359 HC PT THER PROC EA 15 MIN 4/30/2024 Bessie Servin, PT GP 1            PT G-Codes  Outcome Measure Options:  AM-PAC 6 Clicks Basic Mobility (PT)  AM-PAC 6 Clicks Score (PT): 10  AM-PAC 6 Clicks Score (OT): 12  Modified [place] Scale: 4 - Moderately severe disability.  Unable to walk without assistance, and unable to attend to own bodily needs without assistance.  PT Discharge Summary  Anticipated Discharge Disposition (PT): inpatient rehabilitation facility    Bessie Servin, SETH  4/30/2024

## 2024-04-30 NOTE — THERAPY TREATMENT NOTE
Patient Name: Jermaine Singh  : 1945    MRN: [id number]                              Today's Date: 2024       Admit Date: 2024    Visit Dx:     ICD-10-CM ICD-9-CM   1. Acute UTI  N39.0 599.0   2. Generalized weakness  R53.1 780.79   3. Edema of right lower extremity  R60.0 782.3   4. Leg erythema  L53.9 695.9   5. Chronic kidney disease, unspecified CKD stage  N18.9 585.9   6. Elevated troponin  R79.89 790.6   7. Hyperkalemia  E87.5 276.7   8. Cardiac arrest  I46.9 427.5   9. Oropharyngeal dysphagia  R13.12 787.22     Patient Active Problem List   Diagnosis    Type 2 diabetes mellitus with hyperglycemia    Hyperlipidemia LDL goal <70    Hypothyroidism (acquired)    Vitamin D deficiency on Rx     Diabetic neuropathy    Coronary artery disease involving native coronary artery of native heart with angina pectoris    Atherosclerosis of native artery of both lower extremities with intermittent claudication    Pacemaker    HFrEF (heart failure with reduced ejection fraction)    DVT, bilateral lower limbs    Cellulitis of right lower extremity    Anemia, chronic disease    PVD (peripheral vascular disease)    Diabetic foot ulcer    GERD without esophagitis    Stage 3b chronic kidney disease    Right inguinal hernia    PAF (paroxysmal atrial fibrillation)    Moderate malnutrition    UTI (urinary tract infection)    Cardiac arrest    Acute respiratory failure with hypoxia    History of DVT (deep vein thrombosis)    Elevated troponin level not due myocardial infarction     Past Medical History:   Diagnosis Date    Allergic     Arthritis     Asthma     Coronary artery disease     Diabetes mellitus 2000    started on inuslin 2018; started on po meds in ; checking blood sugars daily     Diabetic foot infection 2023    Disease of thyroid gland     po meds daily for hypothyroidism     Elevated serum creatinine 11/15/2022    Elevated troponin 10/02/2022    Generalized weakness 11/15/2022     History of fracture as a child     rt leg- severe     Hyperlipidemia     Hypertension     Hypothyroidism     PAF (paroxysmal atrial fibrillation) 07/23/2023    Peripheral neuropathy     Peripheral vascular disease     s/p angiogram 2/19-needs stent in left leg     Pleural effusion, bilateral 11/15/2022    Proximal phalanx fracture of the second digit extending into the second metatarsal joint 07/28/2022    RBBB     Right knee pain     Status post amputation of great toe and second toe of left foot 07/23/2023    Status post amputation of great toe, left 11/15/2022    Unstable angina 02/12/2019    Added automatically from request for surgery [id number]    Vitamin D deficiency      Past Surgical History:   Procedure Laterality Date    AMPUTATION DIGIT Left 01/17/2023    Procedure: AMPUTATION DIGIT LEFT;  Surgeon: Gopal Márquez MD;  Location:  [facility];  Service: Vascular;  Laterality: Left;    APPENDECTOMY      CARDIAC CATHETERIZATION N/A 02/14/2019    Procedure: Left Heart Cath;  Surgeon: Cooper Apodaca MD;  Location: [facility];  Service: Cardiology    CARDIAC ELECTROPHYSIOLOGY PROCEDURE N/A 05/18/2022    Procedure: DEVICE IMPLANT;  Surgeon: Cooper Apodaca MD;  Location: [facility];  Service: Cardiology;  Laterality: N/A;    CARDIAC SURGERY      COLONOSCOPY      CORONARY ARTERY BYPASS GRAFT N/A 03/22/2019    Procedure: MEDIAN STERNOTOMY, CORONARY ARTERY BYPASS GRAFT X3, UTILIZING THE LEFT INTERNAL MAMMARY ARTERY, EVH AND OPEN HARVEST OF THE RIGHT GREATER SAPHENOUS VEIN, EXPLORATION OF THE LEFT LEG;  Surgeon: Ap Au MD;  Location: [facility];  Service: Cardiothoracic    EYE SURGERY Bilateral     cataracts     FRACTURE SURGERY      INTERVENTIONAL RADIOLOGY PROCEDURE N/A 07/29/2021    Procedure: Abdominal Aortagram with Runoff;  Surgeon: Jermaine Hernandez MD;  Location: [facility];  Service: Cardiovascular;  Laterality: N/A;    KNEE  ARTHROSCOPY      right x 2, left x 1    LACERATION REPAIR      right leg    LEG SURGERY      2 for fracture of rt leg     TONSILLECTOMY      Adnoidectomy    TRANS METATARSAL AMPUTATION Left 08/02/2022    Procedure: GREAT TOE AMPUTATION LEFT;  Surgeon: Gopal Márquez MD;  Location: [facility];  Service: Vascular;  Laterality: Left;      General Information       Row Name 04/30/24 1000          OT Time and Intention    Document Type therapy note (daily note)  -     Mode of Treatment occupational therapy  -       Row Name 04/30/24 1000          General Information    Patient Profile Reviewed yes  -     Existing Precautions/Restrictions fall;cardiac;oxygen therapy device and L/min;orthostatic hypotension;other (see comments)  ART line, RLE wounds, orthostatic hypotension, monitor BP  -     Barriers to Rehab medically complex;previous functional deficit  -       Row Name 04/30/24 1000          Cognition    Orientation Status (Cognition) oriented x 3  -       Row Name 04/30/24 1000          Safety Issues, Functional Mobility    Safety Issues Affecting Function (Mobility) awareness of need for assistance;insight into deficits/self-awareness;safety precaution awareness;safety precautions follow-through/compliance;sequencing abilities  -     Impairments Affecting Function (Mobility) balance;endurance/activity tolerance;strength;postural/trunk control;pain;motor control;motor planning;shortness of breath  -               User Key  (r) = Recorded By, (t) = Taken By, (c) = Cosigned By      Initials Name Provider Type     Stacy Sevilla, ANG Occupational Therapist                   Lymphedema       Row Name 04/29/24 0815             Lymphedema Edema Assessment    Ptting Edema Category By severity  -      Pitting Edema Mild  -      Recorded by [MF] Cooper Ni, PT              Compression/Skin Care    Compression/Skin Care skin care;wrapping location;bandaging  -      Skin Care washed/dried;lotion  applied  -MF      Wrapping Location lower extremity  -MF      Wrapping Location LE bilateral:;foot to knee  -      Wrapping Comments RLE unna boot with coban and spandage  -      Recorded by [] Cooper Ni, PT                User Key  (r) = Recorded By, (t) = Taken By, (c) = Cosigned By      Initials Name Effective Dates     Cooper Ni, PT 02/03/23 -                    Mobility/ADL's       Row Name 04/30/24 1001          Bed Mobility    Bed Mobility supine-sit  -     Supine-Sit [place] (Bed Mobility) moderate assist (50% patient effort);2 person assist;verbal cues  -     Bed Mobility, Safety Issues decreased use of arms for pushing/pulling;decreased use of legs for bridging/pushing;impaired trunk control for bed mobility  -     Assistive Device (Bed Mobility) head of bed elevated;draw sheet;bed rails  -     Comment, (Bed Mobility) Upon sitting EOB pt w/ noted N/V & dizziness. BP checked and drop in BP noted from 103/66 supine to 81/62 sitting EOB, BP rechecked after ~3 minutes sitting EOB and BP improved to 92/64. Pt required frequent VC's to sit upright and req min-mod A to maintain static sitting balance.  -       Row Name 04/30/24 1001          Transfers    Transfers sit-stand transfer;stand-sit transfer  -       Row Name 04/30/24 1001          Sit-Stand Transfer    Sit-Stand [place] (Transfers) maximum assist (25% patient effort);2 person assist;verbal cues  -     Assistive Device (Sit-Stand Transfers) other (see comments)  -       Row Name 04/30/24 1001          Stand-Sit Transfer    Stand-Sit [place] (Transfers) maximum assist (25% patient effort);2 person assist;verbal cues  -     Assistive Device (Stand-Sit Transfers) other (see comments)  -       Row Name 04/30/24 1001          Activities of Daily Living    BADL Assessment/Intervention upper body dressing  -       Row Name 04/30/24 1001          Upper Body Dressing Assessment/Training     [place] Level (Upper Body Dressing) don;other (see comments);moderate assist (50% patient effort);verbal cues  hosp gown  -     Position (Upper Body Dressing) edge of bed sitting  -               User Key  (r) = Recorded By, (t) = Taken By, (c) = Cosigned By      Initials Name Provider Type    Stacy Mcdonnell OT Occupational Therapist                   Obj/Interventions       Row Name 04/30/24 1004          Balance    Balance Assessment sitting static balance;sit to stand dynamic balance;standing static balance  -     Static Sitting Balance minimal assist;verbal cues  -     Position, Sitting Balance supported;sitting edge of bed  -     Sit to Stand Dynamic Balance maximum assist;2-person assist;verbal cues  -     Static Standing Balance maximum assist;2-person assist;verbal cues  -     Position/Device Used, Standing Balance supported  -     Balance Interventions sitting;sit to stand;occupation based/functional task  -               User Key  (r) = Recorded By, (t) = Taken By, (c) = Cosigned By      Initials Name Provider Type     Stacy Sevilla OT Occupational Therapist                   Goals/Plan    No documentation.                  Clinical Impression       Row Name 04/30/24 1004          Pain Assessment    Additional Documentation Pain Scale: FACES Pre/Post-Treatment (Group)  -[facility] Name 04/30/24 1004          Pain Scale: FACES Pre/Post-Treatment    Pain: FACES Scale, Pretreatment 8-->hurts whole lot  -     Posttreatment Pain Rating 8-->hurts whole lot  -     Pain Location - Side/Orientation Right  -     Pain Location upper  -     Pain Location - abdomen  -       Row [facility] 04/30/24 1004          Plan of Care Review    Plan of Care Reviewed With patient;spouse  -     Progress improving  -     Outcome Evaluation Pt presents w/ dizziness, increased pain, decreased functional endurance, generalized weakness, and balance deficits limiting his ADL independence.  Will continue to progress pt as tolerated per OT POC. Rec IRF at d/c.  -       Row Name 04/30/24 1004          Therapy Assessment/Plan (OT)    Rehab Potential (OT) good, to achieve stated therapy goals  -     Criteria for Skilled Therapeutic Interventions Met (OT) yes;skilled treatment is necessary  -     Therapy Frequency (OT) daily  -       Row Name 04/30/24 1004          Therapy Plan Review/Discharge Plan (OT)    Anticipated Discharge Disposition (OT) inpatient rehabilitation facility  -       Row Name 04/30/24 1004          Vital Signs    Pre Systolic BP Rehab 103  supine  -MC     Pre Treatment Diastolic BP 66  -MC     Intra Systolic BP Rehab 81  sitting EOB  -MC     Intra Treatment Diastolic BP 62  -MC     Post Systolic BP Rehab 92  sitting EOB  -MC     Post Treatment Diastolic BP 64  -MC     Pretreatment Heart Rate (beats/min) 59  -MC     Posttreatment Heart Rate (beats/min) 64  -MC     Pre SpO2 (%) 97  -MC     O2 Delivery Pre Treatment room air  -MC     O2 Delivery Intra Treatment room air  -MC     Post SpO2 (%) 99  -MC     O2 Delivery Post Treatment room air  -MC     Pre Patient Position Supine  -MC     Intra Patient Position Standing  -MC     Post Patient Position Sitting  -       Row Name 04/30/24 1004          Positioning and Restraints    Pre-Treatment Position in bed  -MC     Post Treatment Position bed  -MC     In Bed sitting EOB;with PT  -               User Key  (r) = Recorded By, (t) = Taken By, (c) = Cosigned By      Initials Name Provider Type    Stacy Mcdonnell, OT Occupational Therapist                   Outcome Measures       Row Name 04/30/24 1006          How much help from another is currently needed...    Putting on and taking off regular lower body clothing? 2  -MC     Bathing (including washing, rinsing, and drying) 2  -MC     Toileting (which includes using toilet bed pan or urinal) 2  -MC     Putting on and taking off regular upper body clothing 2  -MC     Taking  care of personal grooming (such as brushing teeth) 2  -     Eating meals 2  -     AM-PAC 6 Clicks Score (OT) 12  -       Row Name 04/30/24 1006          Functional Assessment    Outcome Measure Options AM-PAC 6 Clicks Daily Activity (OT)  -               User Key  (r) = Recorded By, (t) = Taken By, (c) = Cosigned By      Initials Name Provider Type     Stacy Sevilla OT Occupational Therapist                    Occupational Therapy Education       Title: PT OT SLP Therapies (In Progress)       Topic: Occupational Therapy (In Progress)       Point: ADL training (In Progress)       Description:   Instruct learner(s) on proper safety adaptation and remediation techniques during self care or transfers.   Instruct in proper use of assistive devices.                  Learning Progress Summary             Patient Acceptance, E, NR by  at 4/30/2024 1006    Acceptance, E, VU by  at 4/25/2024 1314    Acceptance, E, VU by AN at 4/23/2024 1344                         Point: Home exercise program (Done)       Description:   Instruct learner(s) on appropriate technique for monitoring, assisting and/or progressing therapeutic exercises/activities.                  Learning Progress Summary             Patient Acceptance, E, VU by MR at 4/25/2024 1314    Acceptance, E, VU by AN at 4/23/2024 1344                         Point: Precautions (In Progress)       Description:   Instruct learner(s) on prescribed precautions during self-care and functional transfers.                  Learning Progress Summary             Patient Acceptance, E, NR by  at 4/30/2024 1006    Acceptance, E, NR by  at 4/29/2024 1131    Acceptance, E, VU by  at 4/25/2024 1314    Acceptance, E, VU by AN at 4/23/2024 1344   Significant Other Acceptance, E, NR by  at 4/29/2024 1131                         Point: Body mechanics (In Progress)       Description:   Instruct learner(s) on proper positioning and spine alignment during self-care,  functional mobility activities and/or exercises.                  Learning Progress Summary             Patient Acceptance, E, NR by  at 4/30/2024 1006    Acceptance, E, NR by  at 4/29/2024 1131    Acceptance, E, VU by MR at 4/25/2024 1314    Acceptance, E, VU by AN at 4/23/2024 1344   Significant Other Acceptance, E, NR by  at 4/29/2024 1131                                         User Key       Initials Effective Dates Name Provider Type Discipline     10/14/22 -  Stacy Sevilla, OT Occupational Therapist OT    SABRINA 09/21/21 -  Gin Jaeger, OT Occupational Therapist OT    MR 09/22/22 -  Rosalinda Silva, OT Occupational Therapist OT     02/05/24 -  Naye Salinas, OT Occupational Therapist OT                  OT Recommendation and Plan  Therapy Frequency (OT): daily  Plan of Care Review  Plan of Care Reviewed With: patient, spouse  Progress: improving  Outcome Evaluation: Pt presents w/ dizziness, increased pain, decreased functional endurance, generalized weakness, and balance deficits limiting his ADL independence. Will continue to progress pt as tolerated per OT POC. Rec IRF at d/c.     Time Calculation:         Time Calculation- OT       Row Name 04/30/24 1007             Time Calculation- OT    OT Start Time 0843  -      OT Received On 04/30/24  -      OT Goal Re-Cert Due Date 05/09/24  -         Timed Charges    28867 - OT Therapeutic Activity Minutes 6  -MC      38966 - OT Self Care/Mgmt Minutes 3  -MC         Total Minutes    Timed Charges Total Minutes 9  -MC       Total Minutes 9  -MC                User Key  (r) = Recorded By, (t) = Taken By, (c) = Cosigned By      Initials Name Provider Type     Stacy Sevilla OT Occupational Therapist                  Therapy Charges for Today       Code Description Service Date Service Provider Modifiers Qty    [id number] HC OT THERAPEUTIC ACT EA 15 MIN 4/30/2024 Stacy Sevilla OT GO 1                 Stacy Sevilla OT  4/30/2024

## 2024-04-30 NOTE — CASE MANAGEMENT/SOCIAL WORK
Continued Stay Note  [facility]     Patient Name: Jermaine Singh  MRN: [id number]  Today's Date: 4/30/2024    Admit Date: 4/22/2024    Plan: rehab   Discharge Plan       Row Name 04/30/24 1406       Plan    Plan rehab    Patient/Family in Agreement with Plan other (see comments)    Plan Comments I was contacted by Jenna jenkins/[facility] in [place], she stated that she would review a referral, asked me to fax to [phone], aware that patient isn't discharge ready. I faxed information to her @ number given.    Final Discharge Disposition Code 30 - still a patient      Row Name 04/30/24 5181       Plan    Plan rehab    Patient/Family in Agreement with Plan yes    Plan Comments I spoke w/patient and spouse in room, patient stated that he is still weak feeling. Therapy continuing to work w/patient and recommendations of rehab. Patient is agreeable. His spouse is concerned that he may need less intense therapy like skilled instead of acute @ [facility]. She mentioned [facility] in [place]. I explained that  I can send referrals to other places besides [facility], but that [facility] still following as well. I updated Sheila jenkins/[facility], she will continue to follow. Will see how patient progresses. I will call [facility] to see if they have bed availability and to follow patient if they do. Will see how patient continues to do wtherapy and how he feels closer to d/c to determine rehab choice.    Final Discharge Disposition Code 30 - still a patient                   Discharge Codes    No documentation.                 Expected Discharge Date and Time       Expected Discharge Date Expected Discharge Time    May 2, 2024               Reilly Davis RN

## 2024-05-01 ENCOUNTER — APPOINTMENT (OUTPATIENT)
Dept: CARDIOLOGY | Facility: HOSPITAL | Age: 79
End: 2024-05-01
Payer: MEDICARE

## 2024-05-01 LAB
ALBUMIN SERPL-MCNC: 2.7 G/DL (ref 3.5–5.2)
ALBUMIN/GLOB SERPL: 1.1 G/DL
ALP SERPL-CCNC: 72 U/L (ref 39–117)
ALT SERPL W P-5'-P-CCNC: 165 U/L (ref 1–41)
ANION GAP SERPL CALCULATED.3IONS-SCNC: 13 MMOL/L (ref 5–15)
AST SERPL-CCNC: 20 U/L (ref 1–40)
BASOPHILS # BLD AUTO: 0.05 10*3/MM3 (ref 0–0.2)
BASOPHILS NFR BLD AUTO: 0.6 % (ref 0–1.5)
BILIRUB SERPL-MCNC: 0.4 MG/DL (ref 0–1.2)
BUN SERPL-MCNC: 84 MG/DL (ref 8–23)
BUN/CREAT SERPL: 24.8 (ref 7–25)
CALCIUM SPEC-SCNC: 7.6 MG/DL (ref 8.6–10.5)
CHLORIDE SERPL-SCNC: 112 MMOL/L (ref 98–107)
CO2 SERPL-SCNC: 18 MMOL/L (ref 22–29)
CREAT SERPL-MCNC: 3.39 MG/DL (ref 0.76–1.27)
DEPRECATED RDW RBC AUTO: 46.7 FL (ref 37–54)
EGFRCR SERPLBLD CKD-EPI 2021: 17.7 ML/MIN/1.73
EOSINOPHIL # BLD AUTO: 0.45 10*3/MM3 (ref 0–0.4)
EOSINOPHIL NFR BLD AUTO: 5 % (ref 0.3–6.2)
EOSINOPHIL SPEC QL MICRO: 0 % EOS/100 CELLS (ref 0–0)
ERYTHROCYTE [DISTWIDTH] IN BLOOD BY AUTOMATED COUNT: 14.6 % (ref 12.3–15.4)
GLOBULIN UR ELPH-MCNC: 2.5 GM/DL
GLUCOSE BLDC GLUCOMTR-MCNC: 103 MG/DL (ref 70–130)
GLUCOSE BLDC GLUCOMTR-MCNC: 157 MG/DL (ref 70–130)
GLUCOSE BLDC GLUCOMTR-MCNC: 212 MG/DL (ref 70–130)
GLUCOSE BLDC GLUCOMTR-MCNC: 217 MG/DL (ref 70–130)
GLUCOSE SERPL-MCNC: 94 MG/DL (ref 65–99)
HCT VFR BLD AUTO: 27 % (ref 37.5–51)
HGB BLD-MCNC: 9 G/DL (ref 13–17.7)
IMM GRANULOCYTES # BLD AUTO: 0.1 10*3/MM3 (ref 0–0.05)
IMM GRANULOCYTES NFR BLD AUTO: 1.1 % (ref 0–0.5)
LYMPHOCYTES # BLD AUTO: 1.21 10*3/MM3 (ref 0.7–3.1)
LYMPHOCYTES NFR BLD AUTO: 13.6 % (ref 19.6–45.3)
MCH RBC QN AUTO: 30.1 PG (ref 26.6–33)
MCHC RBC AUTO-ENTMCNC: 33.3 G/DL (ref 31.5–35.7)
MCV RBC AUTO: 90.3 FL (ref 79–97)
MONOCYTES # BLD AUTO: 1.09 10*3/MM3 (ref 0.1–0.9)
MONOCYTES NFR BLD AUTO: 12.2 % (ref 5–12)
NEUTROPHILS NFR BLD AUTO: 6.02 10*3/MM3 (ref 1.7–7)
NEUTROPHILS NFR BLD AUTO: 67.5 % (ref 42.7–76)
NRBC BLD AUTO-RTO: 0 /100 WBC (ref 0–0.2)
PLATELET # BLD AUTO: 263 10*3/MM3 (ref 140–450)
PMV BLD AUTO: 10.3 FL (ref 6–12)
POTASSIUM SERPL-SCNC: 4.5 MMOL/L (ref 3.5–5.2)
PROT SERPL-MCNC: 5.2 G/DL (ref 6–8.5)
RBC # BLD AUTO: 2.99 10*6/MM3 (ref 4.14–5.8)
SODIUM SERPL-SCNC: 143 MMOL/L (ref 136–145)
SODIUM UR-SCNC: 46 MMOL/L
WBC NRBC COR # BLD AUTO: 8.92 10*3/MM3 (ref 3.4–10.8)

## 2024-05-01 PROCEDURE — 80053 COMPREHEN METABOLIC PANEL: CPT | Performed by: INTERNAL MEDICINE

## 2024-05-01 PROCEDURE — 99291 CRITICAL CARE FIRST HOUR: CPT | Performed by: INTERNAL MEDICINE

## 2024-05-01 PROCEDURE — 97110 THERAPEUTIC EXERCISES: CPT

## 2024-05-01 PROCEDURE — 93306 TTE W/DOPPLER COMPLETE: CPT

## 2024-05-01 PROCEDURE — 25010000002 SULFUR HEXAFLUORIDE MICROSPH 60.7-25 MG RECONSTITUTED SUSPENSION: Performed by: PHYSICIAN ASSISTANT

## 2024-05-01 PROCEDURE — 92526 ORAL FUNCTION THERAPY: CPT

## 2024-05-01 PROCEDURE — 97530 THERAPEUTIC ACTIVITIES: CPT

## 2024-05-01 PROCEDURE — 25010000002 PIPERACILLIN SOD-TAZOBACTAM PER 1 G: Performed by: INTERNAL MEDICINE

## 2024-05-01 PROCEDURE — 85025 COMPLETE CBC W/AUTO DIFF WBC: CPT | Performed by: INTERNAL MEDICINE

## 2024-05-01 PROCEDURE — 63710000001 INSULIN LISPRO (HUMAN) PER 5 UNITS: Performed by: INTERNAL MEDICINE

## 2024-05-01 PROCEDURE — 25010000002 HEPARIN (PORCINE) PER 1000 UNITS: Performed by: INTERNAL MEDICINE

## 2024-05-01 PROCEDURE — 84300 ASSAY OF URINE SODIUM: CPT | Performed by: INTERNAL MEDICINE

## 2024-05-01 PROCEDURE — 87205 SMEAR GRAM STAIN: CPT | Performed by: INTERNAL MEDICINE

## 2024-05-01 PROCEDURE — 63710000001 INSULIN GLARGINE PER 5 UNITS: Performed by: FAMILY MEDICINE

## 2024-05-01 PROCEDURE — 82948 REAGENT STRIP/BLOOD GLUCOSE: CPT

## 2024-05-01 RX ORDER — CASTOR OIL AND BALSAM, PERU 788; 87 MG/G; MG/G
1 OINTMENT TOPICAL DAILY
Status: DISCONTINUED | OUTPATIENT
Start: 2024-05-02 | End: 2024-05-08 | Stop reason: HOSPADM

## 2024-05-01 RX ADMIN — MIDODRINE HYDROCHLORIDE 10 MG: 10 TABLET ORAL at 21:57

## 2024-05-01 RX ADMIN — HEPARIN SODIUM 5000 UNITS: 5000 INJECTION INTRAVENOUS; SUBCUTANEOUS at 21:57

## 2024-05-01 RX ADMIN — ATORVASTATIN CALCIUM 20 MG: 20 TABLET, FILM COATED ORAL at 20:15

## 2024-05-01 RX ADMIN — HYDROCODONE BITARTRATE AND ACETAMINOPHEN 1 TABLET: 5; 325 TABLET ORAL at 10:19

## 2024-05-01 RX ADMIN — HEPARIN SODIUM 5000 UNITS: 5000 INJECTION INTRAVENOUS; SUBCUTANEOUS at 05:25

## 2024-05-01 RX ADMIN — Medication 1 APPLICATION: at 09:28

## 2024-05-01 RX ADMIN — SULFUR HEXAFLUORIDE 2 ML: KIT at 15:19

## 2024-05-01 RX ADMIN — HEPARIN SODIUM 5000 UNITS: 5000 INJECTION INTRAVENOUS; SUBCUTANEOUS at 14:00

## 2024-05-01 RX ADMIN — SENNOSIDES AND DOCUSATE SODIUM 2 TABLET: 8.6; 5 TABLET ORAL at 09:57

## 2024-05-01 RX ADMIN — INSULIN GLARGINE 8 UNITS: 100 INJECTION, SOLUTION SUBCUTANEOUS at 20:16

## 2024-05-01 RX ADMIN — INSULIN LISPRO 4 UNITS: 100 INJECTION, SOLUTION INTRAVENOUS; SUBCUTANEOUS at 20:16

## 2024-05-01 RX ADMIN — ASPIRIN 81 MG CHEWABLE TABLET 81 MG: 81 TABLET CHEWABLE at 09:57

## 2024-05-01 RX ADMIN — Medication 1 CAPSULE: at 09:56

## 2024-05-01 RX ADMIN — PANTOPRAZOLE SODIUM 40 MG: 40 TABLET, DELAYED RELEASE ORAL at 05:25

## 2024-05-01 RX ADMIN — TAMSULOSIN HYDROCHLORIDE 0.4 MG: 0.4 CAPSULE ORAL at 09:56

## 2024-05-01 RX ADMIN — PIPERACILLIN AND TAZOBACTAM 3.38 G: 3; .375 INJECTION, POWDER, LYOPHILIZED, FOR SOLUTION INTRAVENOUS at 05:32

## 2024-05-01 RX ADMIN — MIDODRINE HYDROCHLORIDE 10 MG: 10 TABLET ORAL at 05:25

## 2024-05-01 RX ADMIN — BISACODYL 5 MG: 5 TABLET, COATED ORAL at 09:56

## 2024-05-01 RX ADMIN — Medication 10 ML: at 09:29

## 2024-05-01 RX ADMIN — MIDODRINE HYDROCHLORIDE 10 MG: 10 TABLET ORAL at 14:00

## 2024-05-01 RX ADMIN — SENNOSIDES AND DOCUSATE SODIUM 2 TABLET: 8.6; 5 TABLET ORAL at 20:15

## 2024-05-01 RX ADMIN — Medication 10 ML: at 20:16

## 2024-05-01 RX ADMIN — LEVOTHYROXINE SODIUM 88 MCG: 88 TABLET ORAL at 05:26

## 2024-05-01 NOTE — CONSULTS
Patient Care Team:  Marysol Shrestha MD as PCP - General (Internal Medicine)  Cooper Apodaca MD as Consulting Physician (Cardiology)    Chief complaint: [place] on CKD stage III/IV  Vfib arrest   Generalize weakness.     History of Present Illness: Mr Singh is a 78 yo gentleman with past medical hx of CKD stage IIIb/IV, DM, HTN, CAD, recurrent UTI. He was admitted few days ago for evaluation of generalize weakness. During the course of hospital stay patient developed Vfib arrest requiring brief CPR. Patient developed worsening renal function with rising BUN& cr and dropping UOP. Patient normally follows with Dr Pal in outpatient clinic. He last saw him 1 month ago. According to the wife patient's ARB dose was reduced due to worsening renal function. Patient lately has been complaining of dysuria and received antibiotics recently. He also underwent cystoscopy with urology which showed signs of cystitis. Nephrology has been consulted for evaluation and management of worsening renal function. Patient is currently on pressors for low blood pressure. Post arrest patient was confused but mentation is improving. Patient does have stable LE edema. Vianey any CP or sob. Labs most recent cr ~ 3.39 BUN 84, bicarb ~ 18 chloride 112.     Review of Systems   Constitutional: Negative.    HENT: Negative.     Respiratory: Negative.     Cardiovascular:  Positive for leg swelling.   Genitourinary: Negative.    Musculoskeletal: Negative.    Neurological: Negative.    Hematological: Negative.         Past Medical History:   Diagnosis Date    Allergic     Arthritis     Asthma     Coronary artery disease     Diabetes mellitus 2000    started on inuslin 12/2018; started on po meds in 2000; checking blood sugars daily     Diabetic foot infection 07/23/2023    Disease of thyroid gland     po meds daily for hypothyroidism     Elevated serum creatinine 11/15/2022    Elevated troponin 10/02/2022    Generalized weakness 11/15/2022     History of fracture as a child     rt leg- severe     Hyperlipidemia     Hypertension     Hypothyroidism     PAF (paroxysmal atrial fibrillation) 07/23/2023    Peripheral neuropathy     Peripheral vascular disease     s/p angiogram 2/19-needs stent in left leg     Pleural effusion, bilateral 11/15/2022    Proximal phalanx fracture of the second digit extending into the second metatarsal joint 07/28/2022    RBBB     Right knee pain     Status post amputation of great toe and second toe of left foot 07/23/2023    Status post amputation of great toe, left 11/15/2022    Unstable angina 02/12/2019    Added automatically from request for surgery [id number]    Vitamin D deficiency    ,   Past Surgical History:   Procedure Laterality Date    AMPUTATION DIGIT Left 01/17/2023    Procedure: AMPUTATION DIGIT LEFT;  Surgeon: Gopal Márquez MD;  Location: [facility] OR;  Service: Vascular;  Laterality: Left;    APPENDECTOMY      CARDIAC CATHETERIZATION N/A 02/14/2019    Procedure: Left Heart Cath;  Surgeon: Cooper Apodaca MD;  Location: [facility] CATH INVASIVE LOCATION;  Service: Cardiology    CARDIAC ELECTROPHYSIOLOGY PROCEDURE N/A 05/18/2022    Procedure: DEVICE IMPLANT;  Surgeon: Cooper Apodaca MD;  Location: [facility] CATH INVASIVE LOCATION;  Service: Cardiology;  Laterality: N/A;    CARDIAC SURGERY      COLONOSCOPY      CORONARY ARTERY BYPASS GRAFT N/A 03/22/2019    Procedure: MEDIAN STERNOTOMY, CORONARY ARTERY BYPASS GRAFT X3, UTILIZING THE LEFT INTERNAL MAMMARY ARTERY, EVH AND OPEN HARVEST OF THE RIGHT GREATER SAPHENOUS VEIN, EXPLORATION OF THE LEFT LEG;  Surgeon: Ap Au MD;  Location: [facility] OR;  Service: Cardiothoracic    EYE SURGERY Bilateral     cataracts     FRACTURE SURGERY      INTERVENTIONAL RADIOLOGY PROCEDURE N/A 07/29/2021    Procedure: Abdominal Aortagram with Runoff;  Surgeon: Jermaine Hernandez MD;  Location: [facility] CATH INVASIVE LOCATION;  Service: Cardiovascular;  Laterality: N/A;    KNEE  ARTHROSCOPY      right x 2, left x 1    LACERATION REPAIR      right leg    LEG SURGERY      2 for fracture of rt leg     TONSILLECTOMY      Adnoidectomy    TRANS METATARSAL AMPUTATION Left 08/02/2022    Procedure: GREAT TOE AMPUTATION LEFT;  Surgeon: Gopal Márquez MD;  Location: [facility];  Service: Vascular;  Laterality: Left;   ,   Family History   Problem Relation Age of Onset    Coronary artery disease Mother     Diabetes Mother     Hyperlipidemia Mother     Cancer Father    ,   Social History     Socioeconomic History    Marital status:     Number of children: 2   Tobacco Use    Smoking status: Never     Passive exposure: Past    Smokeless tobacco: Never   Vaping Use    Vaping status: Never Used   Substance and Sexual Activity    Alcohol use: Not Currently     Comment: every 2-3 months    Drug use: No    Sexual activity: Not Currently     Partners: Female     E-cigarette/Vaping    E-cigarette/Vaping Use Never User      E-cigarette/Vaping Substances     E-cigarette/Vaping Devices         ,   Medications Prior to Admission   Medication Sig Dispense Refill Last Dose    apixaban (ELIQUIS) 5 MG tablet tablet Take 1 tablet by mouth Every 12 (Twelve) Hours. Indications: DVT/PE (active thrombosis) 180 tablet 0     aspirin 81 MG chewable tablet Chew 1 tablet Daily. 90 tablet 3     atorvastatin (LIPITOR) 20 MG tablet Take 1 tablet by mouth Every Night. 90 tablet 1     azelastine (ASTELIN) 0.1 % nasal spray 2 sprays into the nostril(s) as directed by provider 2 (Two) Times a Day. Use in each nostril as directed 30 mL 2     bumetanide (BUMEX) 1 MG tablet Take 1 tablet by mouth 2 (Two) Times a Day. (Patient taking differently: Take 1 tablet by mouth 2 (Two) Times a Day. Qd-bid) 60 tablet 6     carvedilol (COREG) 3.125 MG tablet Take 1 tablet by mouth 2 (Two) Times a Day With Meals.       Cholecalciferol (VITAMIN D3) 5000 units tablet tablet Take 1 tablet by mouth Daily.       Diclofenac Sodium (VOLTAREN) 1 % gel  gel Apply 2 g topically to the appropriate area as directed 4 (Four) Times a Day As Needed (pain).       famotidine (PEPCID) 20 MG tablet Take 1 tablet by mouth 2 (Two) Times a Day As Needed for Heartburn. 180 tablet 1     fluocinonide (LIDEX) 0.05 % cream Apply topically to affected area BID x 1 week then once daily as needed for rash 60 g 2     glucose blood (OneTouch Verio) test strip 1 each by Other route 2 (Two) Times a Day. Use as instructed 100 each 3     Hydrocortisone, Perianal, (ANUSOL-HC) 2.5 % rectal cream Insert  into the rectum 2 (Two) Times a Day. 30 g 3     insulin detemir (LEVEMIR) 100 UNIT/ML injection Inject 10 Units under the skin into the appropriate area as directed Every Night.       Insulin Lispro (humaLOG) 100 UNIT/ML injection Inject 3 Units under the skin into the appropriate area as directed 3 (Three) Times a Day Before Meals. Under 150 3 unit  150-200 4 units  200-250- 5 unit       isosorbide mononitrate (IMDUR) 30 MG 24 hr tablet Take 1 tablet by mouth Daily. 30 tablet 0     levothyroxine (SYNTHROID, LEVOTHROID) 88 MCG tablet Take 1 tablet by mouth Every Morning. 90 tablet 1     linaclotide (Linzess) 290 MCG capsule capsule Take 1 capsule by mouth Every Morning Before Breakfast. 30 capsule 2     losartan (COZAAR) 100 MG tablet Take 0.5 tablets by mouth Daily.       mupirocin (BACTROBAN) 2 % nasal ointment APPLY OINTMENT TOPICALLY TO AFFECTED AREA TWICE DAILY       nitroglycerin (NITROSTAT) 0.4 MG SL tablet Place 1 tablet under the tongue Every 5 (Five) Minutes As Needed for Chest Pain. Take no more than 3 doses in 15 minutes. 9 tablet 12     nystatin (MYCOSTATIN) 225123 UNIT/GM powder Apply  topically to the appropriate area as directed 2 (Two) Times a Day. 60 g 5     Probiotic Product (Align) 4 MG capsule Take 1 each by mouth Daily.       Vibegron (Gemtesa) 75 MG tablet Take 1 tablet by mouth Daily. 14 tablet 0     Xiidra 5 % ophthalmic solution Administer 1 drop to both eyes 2 (Two)  Times a Day.      , and Scheduled Meds:  [Held by provider] apixaban, 5 mg, Oral, Q12H  aspirin, 81 mg, Oral, Daily  atorvastatin, 20 mg, Oral, Nightly  [START ON 5/2/2024] castor oil-balsam peru, 1 Application, Topical, Daily  heparin (porcine), 5,000 Units, Subcutaneous, Q8H  insulin glargine, 8 Units, Subcutaneous, Nightly  Insulin Lispro, 2-9 Units, Subcutaneous, 4x Daily AC & at Bedtime  lactobacillus acidophilus, 1 capsule, Oral, Daily  levothyroxine, 88 mcg, Oral, Q AM  midodrine, 10 mg, Oral, TID AC  pantoprazole, 40 mg, Oral, Q AM  polyethylene glycol, 17 g, Oral, Daily  senna-docusate sodium, 2 tablet, Oral, BID  sodium chloride, 10 mL, Intravenous, Q12H  tamsulosin, 0.4 mg, Oral, Daily       Objective     Vital Signs  Temp:  [97.3 °F (36.3 °C)-97.9 °F (36.6 °C)] 97.9 °F (36.6 °C)  Heart Rate:  [59-75] 75  Resp:  [16-18] 16  BP: ()/(40-75) 105/50    I/O this shift:  In: 236 [P.O.:236]  Out: 75 [Urine:75]  I/O last 3 completed shifts:  In: 1050 [P.O.:490; I.V.:260; IV Piggyback:300]  Out: 745 [Urine:745]    Physical Exam  Constitutional:       General: He is not in acute distress.     Appearance: Normal appearance. He is not ill-appearing.   HENT:      Head: Normocephalic and atraumatic.      Nose: Nose normal.      Mouth/Throat:      Mouth: Mucous membranes are moist. Mucous membranes are dry.   Eyes:      Pupils: Pupils are equal, round, and reactive to light.   Cardiovascular:      Rate and Rhythm: Normal rate and regular rhythm.      Pulses: Normal pulses.      Heart sounds: Normal heart sounds. No murmur heard.     No friction rub.   Pulmonary:      Effort: Pulmonary effort is normal.      Breath sounds: Normal breath sounds.   Abdominal:      General: Abdomen is flat.   Genitourinary:     Comments: Fonseca cath +  Musculoskeletal:      Right lower leg: Edema present.      Left lower leg: Edema present.   Skin:     General: Skin is warm.   Neurological:      General: No focal deficit present.       "Mental Status: He is alert and oriented to person, place, and time. Mental status is at baseline.         Results Review:    I reviewed the patient's new clinical results.    WBC WBC   Date Value Ref Range Status   05/01/2024 8.92 3.40 - 10.80 10*3/mm3 Final   04/30/2024 9.12 3.40 - 10.80 10*3/mm3 Final   04/29/2024 9.28 3.40 - 10.80 10*3/mm3 Final      HGB Hemoglobin   Date Value Ref Range Status   05/01/2024 9.0 (L) 13.0 - 17.7 g/dL Final   04/30/2024 8.8 (L) 13.0 - 17.7 g/dL Final   04/29/2024 7.9 (L) 13.0 - 17.7 g/dL Final      HCT Hematocrit   Date Value Ref Range Status   05/01/2024 27.0 (L) 37.5 - 51.0 % Final   04/30/2024 26.7 (L) 37.5 - 51.0 % Final   04/29/2024 23.8 (L) 37.5 - 51.0 % Final      Platlets No results found for: \"LABPLAT\"   MCV MCV   Date Value Ref Range Status   05/01/2024 90.3 79.0 - 97.0 fL Final   04/30/2024 91.1 79.0 - 97.0 fL Final   04/29/2024 88.5 79.0 - 97.0 fL Final          Sodium Sodium   Date Value Ref Range Status   05/01/2024 143 136 - 145 mmol/L Final   04/30/2024 140 136 - 145 mmol/L Final   04/29/2024 142 136 - 145 mmol/L Final      Potassium Potassium   Date Value Ref Range Status   05/01/2024 4.5 3.5 - 5.2 mmol/L Final   04/30/2024 4.3 3.5 - 5.2 mmol/L Final     Comment:     Slight hemolysis detected by analyzer. Result may be falsely elevated.   04/29/2024 4.0 3.5 - 5.2 mmol/L Final      Chloride Chloride   Date Value Ref Range Status   05/01/2024 112 (H) 98 - 107 mmol/L Final   04/30/2024 110 (H) 98 - 107 mmol/L Final   04/29/2024 112 (H) 98 - 107 mmol/L Final      CO2 CO2   Date Value Ref Range Status   05/01/2024 18.0 (L) 22.0 - 29.0 mmol/L Final   04/30/2024 17.0 (L) 22.0 - 29.0 mmol/L Final   04/29/2024 19.0 (L) 22.0 - 29.0 mmol/L Final      BUN BUN   Date Value Ref Range Status   05/01/2024 84 (H) 8 - 23 mg/dL Final   04/30/2024 82 (H) 8 - 23 mg/dL Final   04/29/2024 87 (H) 8 - 23 mg/dL Final      Creatinine Creatinine   Date Value Ref Range Status   05/01/2024 3.39 " "(H) 0.76 - 1.27 mg/dL Final   04/30/2024 2.68 (H) 0.76 - 1.27 mg/dL Final   04/29/2024 2.52 (H) 0.76 - 1.27 mg/dL Final      Calcium Calcium   Date Value Ref Range Status   05/01/2024 7.6 (L) 8.6 - 10.5 mg/dL Final   04/30/2024 7.8 (L) 8.6 - 10.5 mg/dL Final   04/29/2024 7.8 (L) 8.6 - 10.5 mg/dL Final      PO4 No results found for: \"CAPO4\"   Albumin Albumin   Date Value Ref Range Status   05/01/2024 2.7 (L) 3.5 - 5.2 g/dL Final   04/30/2024 3.0 (L) 3.5 - 5.2 g/dL Final   04/29/2024 2.8 (L) 3.5 - 5.2 g/dL Final      Magnesium Magnesium   Date Value Ref Range Status   04/30/2024 2.8 (H) 1.6 - 2.4 mg/dL Final      Uric Acid No results found for: \"URICACID\"         Assessment & Plan       Cardiac arrest    Type 2 diabetes mellitus with hyperglycemia    Hyperlipidemia LDL goal <70    Hypothyroidism (acquired)    Coronary artery disease involving native coronary artery of native heart with angina pectoris    Pacemaker    HFrEF (heart failure with reduced ejection fraction)    Anemia, chronic disease    Stage 3b chronic kidney disease    UTI (urinary tract infection)    Acute respiratory failure with hypoxia    Elevated troponin level not due myocardial infarction      Assessment & Plan    [place] on CKD stage IIIb/IV:  Baseline cr 1.8-2.2 mg/dl. Recent cr trend 2.2>2.37>2.5>2.63>3.3. UOP in last 24hr ~455. Etiology of [place] likely hemodynamic injury post vfib arrest vs decompensated cardiorenal syndrome. Taken off Zosyn due to concern for AIN. Urine eos pending       CKD stage III/IV: GFR baseline 28-32ml/min. Follows with Dr Pal. Risk factor for CKD: DM, HTN, PVD and CAD. Serological wo    Met acidosis: Due to [place] on CKD    Cardiogenic shock: Requiring pressor support. ECHO post arrest showed EF 20%. Hx of severe LV disease. Cardiology following. Pending repeat ECHO    Volume status: On RA. Does have dependent edema LE b/l    Hx of recurrent UTI: Recently treated for fungal UTI. Urology and ID following     Hx of HTN: Renal " artery duplex negative for WILMA in the past.     Recs  [place] on CKD stage IIIb/IV due to above mentioned risk factor. Patient is due to for repeat ECHO to determine EF. Given severe LAD disease and low EF on previous ECHO he is not a good candidate for long term dialysis. I discussed with patient continued medical management and optimization of blood pressure and volume status for now.   - Target MAP 65 or above  - F/u w urin Eos ( Although carried high false positive in the setting of UTI)  - Strict I/o  - Dose meds to eGFR         I discussed the patients findings and my recommendations with patient    Blake Tilley MD  05/01/24  12:43 EDT

## 2024-05-01 NOTE — PROGRESS NOTES
Clinical Nutrition     Reason for Visit: MDR, Follow-up protocol      Patient Name: Jermaine Singh  YOB: 1945  MRN: [id number]  Date of Encounter: 05/01/24 11:57 EDT  Admission date: 4/22/2024      Nutrition Assessment   Admission Diagnosis:  UTI (urinary tract infection) [N39.0]      Problem List:    Cardiac arrest    Type 2 diabetes mellitus with hyperglycemia    Hyperlipidemia LDL goal <70    Hypothyroidism (acquired)    Coronary artery disease involving native coronary artery of native heart with angina pectoris    Pacemaker    HFrEF (heart failure with reduced ejection fraction)    Anemia, chronic disease    Stage 3b chronic kidney disease    UTI (urinary tract infection)    Acute respiratory failure with hypoxia    Elevated troponin level not due myocardial infarction        PMH:   He  has a past medical history of Allergic, Arthritis, Asthma, Coronary artery disease, Diabetes mellitus (2000), Diabetic foot infection (07/23/2023), Disease of thyroid gland, Elevated serum creatinine (11/15/2022), Elevated troponin (10/02/2022), Generalized weakness (11/15/2022), History of fracture as a child, Hyperlipidemia, Hypertension, Hypothyroidism, PAF (paroxysmal atrial fibrillation) (07/23/2023), Peripheral neuropathy, Peripheral vascular disease, Pleural effusion, bilateral (11/15/2022), Proximal phalanx fracture of the second digit extending into the second metatarsal joint (07/28/2022), RBBB, Right knee pain, Status post amputation of great toe and second toe of left foot (07/23/2023), Status post amputation of great toe, left (11/15/2022), Unstable angina (02/12/2019), and Vitamin D deficiency.    PSH:  He  has a past surgical history that includes Eye surgery (Bilateral); Leg Surgery; Tonsillectomy; Appendectomy; Colonoscopy; Laceration Repair; Cardiac catheterization (N/A, 02/14/2019); Knee arthroscopy; Coronary artery bypass graft (N/A, 03/22/2019); Interventional radiology  procedure (N/A, 07/29/2021); Cardiac electrophysiology procedure (N/A, 05/18/2022); Foot amputation through metatarsal (Left, 08/02/2022); Finger amputation (Left, 01/17/2023); Fracture surgery; and Cardiac surgery.      Applicable Nutrition Concerns:   Skin:L 3rd toe ulcer, R foot blisters, RLE wound,  B gluteal MASD  GI:last bm  4-25      Applicable Interval History:     V-fib arrest 4-25-24  ARF/VENT 4-25-24  Extubated 4-26-24    SLP Recommendation and Plan  SLP Diet Recommendation: regular textures, no mixed consistencies, nectar thick liquids (05/01/24 0830)  Recommended Precautions and Strategies: upright posture during/after eating, small bites of food and sips of liquid, no straw, multiple swallows per bite of food, multiple swallows per sip of liquid, general aspiration precautions, assist with feeding (05/01/24 0830)  SLP Rec. for Method of Medication Administration: meds whole, meds crushed, with puree, as tolerated (05/01/24 0830)  Reported/Observed/Food/Nutrition Related History:     5-1: pt resting in bed, is lethargic, reports not much appetite, family at bedside    Per RN: pt's mental status waxes and wanes, has been more talkative today, did not like his breakfast, did eat 3/4 of his magic cup, plan for repeat FEES tomorrow    4-29:Pt resting in bed, reports fair appetite, states UBW is 170lb    Labs    Labs Reviewed: Yes     Results from last 7 days   Lab Units 05/01/24  0339 04/30/24  0447 04/29/24  0455 04/28/24  0405 04/27/24  0607 04/26/24  0410   GLUCOSE mg/dL 94 162* 170*   < > 142* 188*   BUN mg/dL 84* 82* 87*   < > 82* 78*   CREATININE mg/dL 3.39* 2.68* 2.52*   < > 2.37* 2.28*   SODIUM mmol/L 143 140 142   < > 142 137   CHLORIDE mmol/L 112* 110* 112*   < > 110* 106   POTASSIUM mmol/L 4.5 4.3 4.0   < > 4.7 4.8   PHOSPHORUS mg/dL  --  4.5  --   --   --   --    MAGNESIUM mg/dL  --  2.8*  --   --  2.9* 2.9*   ALT (SGPT) U/L 165* 218* 320*   < > 626* 1,028*    < > = values in this interval not  "displayed.       Results from last 7 days   Lab Units 05/01/24  0339 04/30/24  0447 04/29/24  0455 04/27/24  0607 04/26/24  0410 04/25/24  1441   ALBUMIN g/dL 2.7* 3.0* 2.8*   < > 3.1* 3.3*   IONIZED CALCIUM mmol/L  --   --   --   --  1.21 1.24    < > = values in this interval not displayed.       Results from last 7 days   Lab Units 05/01/24  1053 05/01/24  0723 04/30/24 2002 04/30/24  1725 04/30/24  1149 04/30/24  0955   GLUCOSE mg/dL 157* 103 207* 225* 202* 181*     Lab Results   Lab Value Date/Time    HGBA1C 8.50 (H) 04/23/2024 0550    HGBA1C 8.70 (H) 07/23/2023 2235                 Medications    Medications Reviewed: Yes    Intake/Ouptut 24 hrs (0701 - 0700)   I&O's Reviewed: Yes     Intake & Output (last day)         04/30 0701  05/01 0700 05/01 0701 05/02 0700    P.O. 250 236    I.V. (mL/kg) 169.2 (1.9)     Other      IV Piggyback 200     Total Intake(mL/kg) 619.2 (7) 236 (2.7)    Urine (mL/kg/hr) 455 (0.2) 75 (0.2)    Total Output 455 75    Net +164.2 +161                    Anthropometrics     Flowsheet Rows      Flowsheet Row First Filed Value   Admission Height 190.5 cm (75\") Documented at 04/22/2024 1438   Admission Weight 77.1 kg (170 lb) Documented at 04/22/2024 1438          Height: Height: 190 cm (74.8\")  Last Filed Weight: Weight: 88.5 kg (195 lb 1.7 oz) (05/01/24 0545)  Method: Weight Method: Bed scale  BMI: BMI (Calculated): 24.5  BMI classification: Normal: 18.5-24.9kg/m2    UBW: 170lb    Weight change: 22lb wt gain since admission per EMR, ? Inaccurate bedscale wt     Weight       Weight (kg) Weight (lbs) Weight Method Visit Report   1/17/2023 90.447 kg  199 lb 6.4 oz       90.447 kg  199 lb 6.4 oz      1/18/2023 97.569 kg  215 lb 1.6 oz  Bed scale      91.899 kg  202 lb 9.6 oz  Standing scale     1/19/2023 93.895 kg  207 lb  Bed scale     1/20/2023 92.987 kg  205 lb  Bed scale     1/21/2023 92.761 kg  204 lb 8 oz  Bed scale     1/22/2023 91.309 kg  201 lb 4.8 oz  Bed scale     1/25/2023 " 91.173 kg  201 lb   --    2/15/2023 89.812 kg  198 lb   --    3/8/2023 88.905 kg  196 lb   --    3/14/2023 88.905 kg  196 lb   --    3/22/2023 88.451 kg  195 lb   --    5/16/2023 84.823 kg  187 lb   --    5/24/2023 84.823 kg  187 lb   --    7/23/2023 80.287 kg  177 lb  Stated      81.285 kg  179 lb 3.2 oz      8/4/2023 80.287 kg  177 lb   --    8/23/2023 83.915 kg  185 lb   --    10/18/2023 76.658 kg  169 lb      10/19/2023 76.658 kg  169 lb   --    3/5/2024 77.111 kg  170 lb   --    3/9/2024 77.1 kg  169 lb 15.6 oz  Estimated     4/22/2024 77.111 kg  170 lb  Stated     4/23/2024 77.701 kg  171 lb 4.8 oz  Bed scale     4/25/2024 77 kg  169 lb 12.1 oz      4/26/2024 81.5 kg  179 lb 10.8 oz  Bed scale     4/27/2024 81.2 kg  179 lb 0.2 oz  Bed scale     4/28/2024 84.4 kg  186 lb 1.1 oz  Bed scale     4/29/2024 87.7 kg  193 lb 5.5 oz            Nutrition Focused Physical Exam     Date:     Unable to perform exam due to: Defer pending indication      Current Nutrition Prescription     PO: Diet: Cardiac, Diabetic; Healthy Heart (2-3 Na+); Consistent Carbohydrate; No Mixed Consistencies, No Straw, Feeding Assistance - Nursing; Texture: Regular (IDDSI 7); Fluid Consistency: [place] Thick  Oral Nutrition Supplement: Magic Cups 3x/day  Intake:  33% of 3 meals      Nutrition Diagnosis   Date: 4-29-24 Updated: 5-1  Problem Inadequate oral intake   Etiology Per Clinical Status   Signs/Symptoms Po intake 33%       Goal:   General: Nutrition to support treatment  PO: Increase intake  EN/PN: N/A    Nutrition Intervention      Follow treatment progress, Advise alternate selection, Interview for preferences, Menu adjusted, Encourage intake    Monitoring/Evaluation:   Per protocol, I&O, PO intake, Supplement intake, Pertinent labs, Weight, Skin status, GI status, Symptoms, Swallow function, Hemodynamic stability      Rosalinda Edge RD  Time Spent: 30min

## 2024-05-01 NOTE — PROGRESS NOTES
INFECTIOUS DISEASE Progress Note    Jermaine Singh  1945  [id number]    Admission Date: 4/22/2024      Requesting Provider: No ref. provider found  Evaluating Physician: Lincoln Padilla MD    Reason for Consultation:  UTI    History of present illness:    4/23/24: Patient is a 79 y.o. male  with a history of type 2 diabetes mellitus, peripheral neuropathy, stage III chronic kidney disease, bilateral DVTs, paroxysmal atrial fibrillation, sick sinus syndrome status post pacemaker placement, CHF, peripheral vascular disease, and left diabetic foot infection who is seen today for evaluation of UTI.  He had a history of UTI last month with urine cultures and early March growing yeast.  He was apparently treated with an antifungal agent.    Over the last few days he noted the onset of profound weakness and fatigue.  He also noticed some dysuria.  He was brought to the emergency room and found to have pyuria and bacteriuria with leukocytosis.   Urine culture was sent and he was started on ceftriaxone.   His white blood cell count today is 11.8.   He was noted to have a blister over his right fifth metatarsal head and a chronic right pretibial wound.    4/24/24: He remains afebrile.  His urine culture was finalized as negative.  I have asked the laboratory to hold the urine culture for yeast but they did not and it was disposed of last night.   He states that his dysuria is improved today.  Feels better.     4/25/2024:  He remains afebrile.  White blood cell count today is 6.7.  Creatinine is 1.99.   Urinalysis yesterday revealed too numerous to count white blood cells.  He denied dysuria this morning.  After I saw him he suffered a V-fib cardiac arrest and was transferred to the ICU.  He is endotracheally intubated    4/26/24:   His maximum temperature over the last 24 hours is 99.5.  Repeat urine culture from 4/24 is pending.  He remains on ventilatory support.    Creatinine is 2.28.  ALT is  1028.  White  blood cell count is 9.95.  O2 saturation is 100% on an FiO2 of 30%.  Chest x-ray reveals no pulmonary infiltrates. \    Later this morning he was extubated.  He is confused.  He knows his name and his birthdate.  He cannot answer more complex questions.    4/27/2024:  His maximum temperature over the last 24 hours is 99.5.  His left ventricular ejection fraction is less than 20%.  His white blood cell count is 2.37.  ALT is down to 626.   Chest x-ray 4/27 reveals moderate hemoglobin airspace disease.   O2 saturation is 99% on 4 L.   He is on Zosyn therapy.   Later today his oxygen demand increased and he was placed   On BiPAP.     4/28/2024:   He has remained afebrile.   White blood cell count is 10.1. His creatinine is 2.65 ALT is 450.  His oxygen demand is down to 1 liter today. Chest x-ray today reveals interval improvement in the right upper lobe infiltrate.  He is coughing and has some sputum production.  He remains confused and did not recognize his wife and daughter today      4/29/2024: He remains afebrile.  His creatinine is 2.52.  ALT is 320.  White blood cell count is 9.3.    He is now on 1 L with an O2 saturation of 96%.  He is more alert and responsive today.  He is less confused.     4/30/2024: He has remained afebrile.  Creatinine is 2.68.  ALT is 218.   White blood cell count is 9.1.  room air. room air.  His confusion is improved today.  He recognized his wife today.  He denies increased cough and sputum production.  He knows me by name today.    5/1/2024:  He remains afebrile.  His creatinine today is 3.39.   White blood cell count is 8.9. O2 saturation is 97% on room air.      Past Medical History:   Diagnosis Date    Allergic     Arthritis     Asthma     Coronary artery disease     Diabetes mellitus 2000    started on inuslin 12/2018; started on po meds in 2000; checking blood sugars daily     Diabetic foot infection 07/23/2023    Disease of thyroid gland     po meds daily for  hypothyroidism     Elevated serum creatinine 11/15/2022    Elevated troponin 10/02/2022    Generalized weakness 11/15/2022    History of fracture as a child     rt leg- severe     Hyperlipidemia     Hypertension     Hypothyroidism     PAF (paroxysmal atrial fibrillation) 07/23/2023    Peripheral neuropathy     Peripheral vascular disease     s/p angiogram 2/19-needs stent in left leg     Pleural effusion, bilateral 11/15/2022    Proximal phalanx fracture of the second digit extending into the second metatarsal joint 07/28/2022    RBBB     Right knee pain     Status post amputation of great toe and second toe of left foot 07/23/2023    Status post amputation of great toe, left 11/15/2022    Unstable angina 02/12/2019    Added automatically from request for surgery [id number]    Vitamin D deficiency        Past Surgical History:   Procedure Laterality Date    AMPUTATION DIGIT Left 01/17/2023    Procedure: AMPUTATION DIGIT LEFT;  Surgeon: Gopal Márquez MD;  Location:  [facility];  Service: Vascular;  Laterality: Left;    APPENDECTOMY      CARDIAC CATHETERIZATION N/A 02/14/2019    Procedure: Left Heart Cath;  Surgeon: Cooper Apodaca MD;  Location:  [facility] INVASIVE LOCATION;  Service: Cardiology    CARDIAC ELECTROPHYSIOLOGY PROCEDURE N/A 05/18/2022    Procedure: DEVICE IMPLANT;  Surgeon: Cooper Apodaca MD;  Location:  [facility] INVASIVE LOCATION;  Service: Cardiology;  Laterality: N/A;    CARDIAC SURGERY      COLONOSCOPY      CORONARY ARTERY BYPASS GRAFT N/A 03/22/2019    Procedure: MEDIAN STERNOTOMY, CORONARY ARTERY BYPASS GRAFT X3, UTILIZING THE LEFT INTERNAL MAMMARY ARTERY, EVH AND OPEN HARVEST OF THE RIGHT GREATER SAPHENOUS VEIN, EXPLORATION OF THE LEFT LEG;  Surgeon: Ap Au MD;  Location:  [facility];  Service: Cardiothoracic    EYE SURGERY Bilateral     cataracts     FRACTURE SURGERY      INTERVENTIONAL RADIOLOGY PROCEDURE N/A 07/29/2021    Procedure: Abdominal Aortagram with Runoff;   Surgeon: Jermaine Hernandez MD;  Location:  [facility];  Service: Cardiovascular;  Laterality: N/A;    KNEE ARTHROSCOPY      right x 2, left x 1    LACERATION REPAIR      right leg    LEG SURGERY      2 for fracture of rt leg     TONSILLECTOMY      Adnoidectomy    TRANS METATARSAL AMPUTATION Left 08/02/2022    Procedure: GREAT TOE AMPUTATION LEFT;  Surgeon: Gopal Márquez MD;  Location:  [facility];  Service: Vascular;  Laterality: Left;       Family History   Problem Relation Age of Onset    Coronary artery disease Mother     Diabetes Mother     Hyperlipidemia Mother     Cancer Father        Social History     Socioeconomic History    Marital status:     Number of children: 2   Tobacco Use    Smoking status: Never     Passive exposure: Past    Smokeless tobacco: Never   Vaping Use    Vaping status: Never Used   Substance and Sexual Activity    Alcohol use: Not Currently     Comment: every 2-3 months    Drug use: No    Sexual activity: Not Currently     Partners: Female       Allergies   Allergen Reactions    Vancomycin Other (See Comments)     Kidney failure per last admission         Medication:    Current Facility-Administered Medications:     acetaminophen (TYLENOL) tablet 650 mg, 650 mg, Oral, Q6H PRN, Maricel Peraza, APRN, 650 mg at 04/25/24 0940    albuterol (PROVENTIL) nebulizer solution 0.083% 2.5 mg/3mL, 2.5 mg, Nebulization, BID PRN, Nathalia Mckeon, SOPHIA, APRN, 2.5 mg at 04/30/24 0755    [Held by provider] apixaban (ELIQUIS) tablet 5 mg, 5 mg, Oral, Q12H, Maricel Peraza, APRN, 5 mg at 04/25/24 0840    aspirin chewable tablet 81 mg, 81 mg, Oral, Daily, Maricel Peraza APRN, 81 mg at 04/30/24 0957    atorvastatin (LIPITOR) tablet 20 mg, 20 mg, Oral, Nightly, Golden Madison MD, 20 mg at 04/30/24 2029    azelastine (ASTELIN) nasal spray 2 spray, 2 spray, Each Nare, BID, Maricel Peraza APRN, 2 spray at 04/29/24 0805    sennosides-docusate (PERICOLACE) 8.6-50  MG per tablet 2 tablet, 2 tablet, Oral, BID, 2 tablet at 04/30/24 2028 **AND** polyethylene glycol (MIRALAX) packet 17 g, 17 g, Oral, Daily PRN, 17 g at 04/30/24 2038 **AND** bisacodyl (DULCOLAX) EC tablet 5 mg, 5 mg, Oral, Daily PRN **AND** bisacodyl (DULCOLAX) suppository 10 mg, 10 mg, Rectal, Daily PRN, Antonio Angeles MD    castor oil-balsam peru (VENELEX) ointment 1 Application, 1 Application, Topical, Q12H, Eyad Ledesma MD, 1 Application at 04/30/24 2029    dextrose (D50W) (25 g/50 mL) IV injection 25 g, 25 g, Intravenous, Q15 Min PRN, Maricel Peraza APRN    glucagon (GLUCAGEN) injection 1 mg, 1 mg, Intramuscular, Q15 Min PRN, Maricel Peraza APRN    heparin (porcine) 5000 UNIT/ML injection 5,000 Units, 5,000 Units, Subcutaneous, Q8H, Antonio Angeles MD, 5,000 Units at 05/01/24 0525    HYDROcodone-acetaminophen (NORCO) 5-325 MG per tablet 1 tablet, 1 tablet, Oral, Q4H PRN, Hailee Shukla, DO, 1 tablet at 04/30/24 2355    insulin glargine (LANTUS, SEMGLEE) injection 8 Units, 8 Units, Subcutaneous, Nightly, Luz Elena Batista, DO, 8 Units at 04/30/24 2029    Insulin Lispro (humaLOG) injection 2-9 Units, 2-9 Units, Subcutaneous, 4x Daily AC & at Bedtime, Antonio Angeles MD, 4 Units at 04/30/24 2029    lactobacillus acidophilus (RISAQUAD) capsule 1 capsule, 1 capsule, Oral, Daily, Maricel Peraza APRN, 1 capsule at 04/30/24 0957    levothyroxine (SYNTHROID, LEVOTHROID) tablet 88 mcg, 88 mcg, Oral, Q AM, Maricel Peraza APRN, 88 mcg at 05/01/24 0526    Magnesium Low Dose Replacement - Follow Nurse / BPA Driven Protocol, , Does not apply, PRN, Maricel Pearza, DAVID    midodrine (PROAMATINE) tablet 10 mg, 10 mg, Oral, TID ACGricelda Kevin T, MD, 10 mg at 05/01/24 0525    nitroglycerin (NITROSTAT) SL tablet 0.4 mg, 0.4 mg, Sublingual, Q5 Min PRN, Maricel Peraza APRN    norepinephrine (LEVOPHED) 8 mg in 250 mL NS infusion (premix), 0.02-0.3 mcg/kg/min, Intravenous,  Titrated, Eyad Ledesma MD, Last Rate: 5.83 mL/hr at 24 0222, 0.04 mcg/kg/min at 24 0222    pantoprazole (PROTONIX) EC tablet 40 mg, 40 mg, Oral, Q AM, Antonio Angeles MD, 40 mg at 24 0525    piperacillin-tazobactam (ZOSYN) 3.375 g IVPB in 100 mL NS MBP (CD), 3.375 g, Intravenous, Q8H, Lincoln Padilla MD, 3.375 g at 24 0532    polyethylene glycol (MIRALAX) packet 17 g, 17 g, Oral, Daily, Artur Avila MD, 17 g at 24 1303    prochlorperazine (COMPAZINE) injection 5 mg, 5 mg, Intravenous, Q6H PRN, Brad Perazaa W, APRN, 2.5 mg at 24 1316    sodium chloride 0.9 % flush 10 mL, 10 mL, Intravenous, PRN, Case, Hailee V., DO    sodium chloride 0.9 % flush 10 mL, 10 mL, Intravenous, Q12H, Case, Hailee V., DO, 10 mL at 24 2029    tamsulosin (FLOMAX) 24 hr capsule 0.4 mg, 0.4 mg, Oral, Daily, Maricel Peraza W, APRN, 0.4 mg at 24 0957    Antibiotics:  Anti-Infectives (From admission, onward)      Ordered     Dose/Rate Route Frequency Start Stop    24 1356  piperacillin-tazobactam (ZOSYN) 3.375 g IVPB in 100 mL NS MBP (CD)        Ordering Provider: Lincoln Padilla MD    3.375 g  over 4 Hours Intravenous Every 8 Hours 24 2200 24 2159    24 1356  piperacillin-tazobactam (ZOSYN) 3.375 g IVPB in 100 mL NS MBP (CD)        Ordering Provider: Eyad Ledesma MD    3.375 g  over 30 Minutes Intravenous Once 24 1445 24 1557              Review of Systems:  See HPI      Physical Exam:   Vital Signs  Temp (24hrs), Av.5 °F (36.4 °C), Min:97 °F (36.1 °C), Max:97.9 °F (36.6 °C)    Temp  Min: 97 °F (36.1 °C)  Max: 97.9 °F (36.6 °C)  BP  Min: 83/51  Max: 111/71  Pulse  Min: 59  Max: 75  Resp  Min: 16  Max: 18  SpO2  Min: 94 %  Max: 100 %    GENERAL:   Alert and responsive.  HEENT: Normocephalic, atraumatic.  PERRL. EOMI. No conjunctival injection. No icterus.  No labial ulcers  NECK: Supple   HEART:  irregular rate  "  LUNGS:    He has mild right greater than left rhonchi  ABDOMEN: Soft, nontender, nondistended. No rebound or guarding. NO mass or HSM.  EXT: His right distal leg is in an  Unna boot.   Right arm arterial line has no erythema  Right arm PICC has no erythema  :   Fonseca catheter in place.  MSK: No joint effusions or erythema  SKIN: Warm and dry without cutaneous eruptions on Inspection/palpation.    NEURO:    He is alert and responsive.  His confusion has abated.  He moves all of his extremities.    Laboratory Data    Results from last 7 days   Lab Units 05/01/24  0339 04/30/24  0447 04/29/24  0455   WBC 10*3/mm3 8.92 9.12 9.28   HEMOGLOBIN g/dL 9.0* 8.8* 7.9*   HEMATOCRIT % 27.0* 26.7* 23.8*   PLATELETS 10*3/mm3 263 232 205     Results from last 7 days   Lab Units 05/01/24  0339   SODIUM mmol/L 143   POTASSIUM mmol/L 4.5   CHLORIDE mmol/L 112*   CO2 mmol/L 18.0*   BUN mg/dL 84*   CREATININE mg/dL 3.39*   GLUCOSE mg/dL 94   CALCIUM mg/dL 7.6*     Results from last 7 days   Lab Units 05/01/24  0339   ALK PHOS U/L 72   BILIRUBIN mg/dL 0.4   ALT (SGPT) U/L 165*   AST (SGOT) U/L 20                         Estimated Creatinine Clearance: 22.1 mL/min (A) (by C-G formula based on SCr of 3.39 mg/dL (H)).      Microbiology:  No results found for: \"ACANTHNAEG\", \"AFBCX\", \"BPERTUSSISCX\", \"BLOODCX\"  No results found for: \"BCIDPCR\", \"CXREFLEX\", \"CSFCX\", \"CULTURETIS\"  No results found for: \"CULTURES\", \"HSVCX\", \"URCX\"  No results found for: \"EYECULTURE\", \"GCCX\", \"HSVCULTURE\", \"LABHSV\"  No results found for: \"LEGIONELLA\", \"MRSACX\", \"MUMPSCX\", \"MYCOPLASCX\"  No results found for: \"NOCARDIACX\", \"STOOLCX\"  Urine Culture   Date Value Ref Range Status   04/22/2024 No growth  Preliminary     No results found for: \"VIRALCULTU\", \"WOUNDCX\"        Radiology:  Imaging Results (Last 72 Hours)       Procedure Component Value Units Date/Time    XR Chest 1 View [[id number]] Collected: 04/28/24 1831     Updated: 04/28/24 1834    Narrative:      XR " CHEST 1 VW    Date of Exam: 4/28/2024 6:04 PM EDT    Indication: picc placement    Comparison: Same day radiographs    Findings:  Left chest wall cardiac device with distal lead tips in unchanged position. Interval placement of a right approach central venous catheter with distal tip overlying the superior vena cava. Unchanged cardiomediastinal silhouette with cardiac surgical   changes. No new focal airspace consolidation. No pleural effusion or pneumothorax. Osseous structures are unchanged.      Impression:      Impression:  Interval placement of right approach central venous catheter with distal tip overlying the superior vena cava. No pneumothorax.      Electronically Signed: Navneet Crisostomo MD    4/28/2024 6:31 PM EDT    Workstation ID: [id number]    [facility] FEES - Fiberoptic Endo Eval Swallow [[id number]] Resulted: 04/28/24 1401     Updated: 04/28/24 1401    Narrative:      This procedure was auto-finalized with no dictation required.    XR Chest 1 View [[id number]] Collected: 04/28/24 1013     Updated: 04/28/24 1019    Narrative:      XR CHEST 1 VW    Date of Exam: 4/28/2024 4:30 AM EDT    Indication: Hypoxia, aspiration    Comparison: 4/27/2024    Findings:  Heart size and pulmonary vasculature are stable. There is been resolution of right upper lobe airspace disease. The lungs are clear other than mild atelectasis in the lung bases. Costophrenic angles are sharp      Impression:      Impression:    1. Interval improvement in right upper lobe airspace disease.    2. Bibasilar atelectasis      Electronically Signed: Miguel Nava    4/28/2024 10:16 AM EDT    Workstation ID: [id number]          I read his chest x-ray of 4/28      Impression:    1.    Right upper lobe aspiration pneumonia-he is now on Zosyn therapy.  He is clinically and radiographically improved.  I will discontinue Zosyn.   2.  V-fib arrest-status post resuscitation.  He is on amiodarone   3.   Acute kidney injury-superimposed on stage IIIb chronic  kidney disease.  His acute kidney injury could be secondary to Zosyn.  I will order a urine eosinophil stain to look for allergic interstitial nephritis.  I will discontinue Zosyn.   4.  Chronic right pretibial dermatitis-without evidence of cellulitis  5.  Type 2 diabetes mellitus  6.  coronary artery disease/status post CABG, 3/22/2019  7.  Severe systolic heart failure-systolic function is severely impaired with an ejection fraction of less than 20%.  8.  Stage IIIb chronic kidney disease  9.  Paroxysmal atrial fibrillation  10.  Acute hypoxic respiratory failure- improved today with the decreased oxygen demand.  11.  Ischemic hepatitis-this continues to improve with decreased transaminitis.  12.  Pyuria- with a negative urine culture.  13.  Anoxic encephalopathy- improving  14.  Right fifth metatarsal wound/bullous lesion    PLAN/RECOMMENDATIONS:   1.  Continue with a right leg Unna boot  2.  Discontinue Zosyn  3.  Urine eosinophil stain    I discussed his complex situation with Dr. Bridgette Padilla MD  5/1/2024  07:18 EDT

## 2024-05-01 NOTE — THERAPY TREATMENT NOTE
Acute Care - Speech Language Pathology   Swallow Treatment Note [facility]     Patient Name: Jermaine Singh  : 1945  MRN: [id number]  Today's Date: 2024               Admit Date: 2024    Visit Dx:     ICD-10-CM ICD-9-CM   1. Acute UTI  N39.0 599.0   2. Generalized weakness  R53.1 780.79   3. Edema of right lower extremity  R60.0 782.3   4. Leg erythema  L53.9 695.9   5. Chronic kidney disease, unspecified CKD stage  N18.9 585.9   6. Elevated troponin  R79.89 790.6   7. Hyperkalemia  E87.5 276.7   8. Cardiac arrest  I46.9 427.5   9. Oropharyngeal dysphagia  R13.12 787.22     Patient Active Problem List   Diagnosis    Type 2 diabetes mellitus with hyperglycemia    Hyperlipidemia LDL goal <70    Hypothyroidism (acquired)    Vitamin D deficiency on Rx     Diabetic neuropathy    Coronary artery disease involving native coronary artery of native heart with angina pectoris    Atherosclerosis of native artery of both lower extremities with intermittent claudication    Pacemaker    HFrEF (heart failure with reduced ejection fraction)    DVT, bilateral lower limbs    Cellulitis of right lower extremity    Anemia, chronic disease    PVD (peripheral vascular disease)    Diabetic foot ulcer    GERD without esophagitis    Stage 3b chronic kidney disease    Right inguinal hernia    PAF (paroxysmal atrial fibrillation)    Moderate malnutrition    UTI (urinary tract infection)    Cardiac arrest    Acute respiratory failure with hypoxia    History of DVT (deep vein thrombosis)    Elevated troponin level not due myocardial infarction     Past Medical History:   Diagnosis Date    Allergic     Arthritis     Asthma     Coronary artery disease     Diabetes mellitus 2000    started on inuslin 2018; started on po meds in ; checking blood sugars daily     Diabetic foot infection 2023    Disease of thyroid gland     po meds daily for hypothyroidism     Elevated serum creatinine 11/15/2022    Elevated  troponin 10/02/2022    Generalized weakness 11/15/2022    History of fracture as a child     rt leg- severe     Hyperlipidemia     Hypertension     Hypothyroidism     PAF (paroxysmal atrial fibrillation) 07/23/2023    Peripheral neuropathy     Peripheral vascular disease     s/p angiogram 2/19-needs stent in left leg     Pleural effusion, bilateral 11/15/2022    Proximal phalanx fracture of the second digit extending into the second metatarsal joint 07/28/2022    RBBB     Right knee pain     Status post amputation of great toe and second toe of left foot 07/23/2023    Status post amputation of great toe, left 11/15/2022    Unstable angina 02/12/2019    Added automatically from request for surgery [id number]    Vitamin D deficiency      Past Surgical History:   Procedure Laterality Date    AMPUTATION DIGIT Left 01/17/2023    Procedure: AMPUTATION DIGIT LEFT;  Surgeon: Gopal Márquez MD;  Location:  [facility];  Service: Vascular;  Laterality: Left;    APPENDECTOMY      CARDIAC CATHETERIZATION N/A 02/14/2019    Procedure: Left Heart Cath;  Surgeon: Cooper Apodaca MD;  Location:  [facility];  Service: Cardiology    CARDIAC ELECTROPHYSIOLOGY PROCEDURE N/A 05/18/2022    Procedure: DEVICE IMPLANT;  Surgeon: Cooper Apodaca MD;  Location:  [facility];  Service: Cardiology;  Laterality: N/A;    CARDIAC SURGERY      COLONOSCOPY      CORONARY ARTERY BYPASS GRAFT N/A 03/22/2019    Procedure: MEDIAN STERNOTOMY, CORONARY ARTERY BYPASS GRAFT X3, UTILIZING THE LEFT INTERNAL MAMMARY ARTERY, EVH AND OPEN HARVEST OF THE RIGHT GREATER SAPHENOUS VEIN, EXPLORATION OF THE LEFT LEG;  Surgeon: Ap Au MD;  Location:  [facility];  Service: Cardiothoracic    EYE SURGERY Bilateral     cataracts     FRACTURE SURGERY      INTERVENTIONAL RADIOLOGY PROCEDURE N/A 07/29/2021    Procedure: Abdominal Aortagram with Runoff;  Surgeon: Jermaine Hernandez MD;  Location:  [facility];   Service: Cardiovascular;  Laterality: N/A;    KNEE ARTHROSCOPY      right x 2, left x 1    LACERATION REPAIR      right leg    LEG SURGERY      2 for fracture of rt leg     TONSILLECTOMY      Adnoidectomy    TRANS METATARSAL AMPUTATION Left 08/02/2022    Procedure: GREAT TOE AMPUTATION LEFT;  Surgeon: Gopal Márquez MD;  Location: [facility];  Service: Vascular;  Laterality: Left;       SLP Recommendation and Plan     SLP Diet Recommendation: regular textures, no mixed consistencies, nectar thick liquids (05/01/24 0830)  Recommended Precautions and Strategies: upright posture during/after eating, small bites of food and sips of liquid, no straw, multiple swallows per bite of food, multiple swallows per sip of liquid, general aspiration precautions, assist with feeding (05/01/24 0830)  SLP Rec. for Method of Medication Administration: meds whole, meds crushed, with puree, as tolerated (05/01/24 0830)     Monitor for Signs of Aspiration: yes, notify SLP if any concerns (05/01/24 0830)  Recommended Diagnostics: reassess via FEES (05/01/24 0830)     Anticipated Discharge Disposition (SLP): inpatient rehabilitation facility (05/01/24 0830)     Therapy Frequency (Swallow): 5 days per week (05/01/24 0830)  Predicted Duration Therapy Intervention (Days): until discharge (05/01/24 0830)  Oral Care Recommendations: Oral Care BID/PRN, Suction toothbrush (05/01/24 0830)        Daily Summary of Progress (SLP): progress toward functional goals as expected (05/01/24 0830)               Treatment Assessment (SLP): pharyngeal dysphagia (05/01/24 0830)  Treatment Assessment Comments (SLP): Pt seen for tx. Great effort with exs. No s/sx with any PO trial. Appears ready for repeat instrumental to assess for upgrade (05/01/24 0830)  Plan for Continued Treatment (SLP): continue treatment per plan of care (05/01/24 0830)         Plan of Care Reviewed With: patient  Progress: improving      SWALLOW EVALUATION (Last 72 Hours)       SLP  Adult Swallow Evaluation       Row Name 05/01/24 0830 04/28/24 1450                Rehab Evaluation    Document Type therapy note (daily note)  -RS evaluation  -CJ       Subjective Information no complaints  -RS no complaints  -CJ       Patient Observations alert;cooperative;agree to therapy  -RS alert;cooperative  -CJ       Patient/Family/Caregiver Comments/Observations no family present  -RS spouse/daughter present and RN  -CJ       Patient Effort good  -RS good  -CJ       Symptoms Noted During/After Treatment none  -RS none  -CJ       Oral Care other (see comments)  pt seen with breakfast tray  -RS --          General Information    Patient Profile Reviewed -- yes  -CJ       Pertinent History Of Current Problem -- see am eval; referred for FEES  -CJ       Current Method of Nutrition -- NPO;no current diet order  -CJ       Precautions/Limitations, Vision -- WFL;for purposes of eval  -CJ       Precautions/Limitations, Hearing -- WFL;for purposes of eval  -CJ       Prior Level of Function-Communication -- unknown  -       Prior Level of Function-Swallowing -- unknown  -       Plans/Goals Discussed with -- patient;family  -       Barriers to Rehab -- medically complex  -       Patient's Goals for Discharge -- patient did not state  -       Family Goals for Discharge -- patient able to return to PO diet  -CJ          Pain    Additional Documentation Pain Scale: FACES Pre/Post-Treatment (Group)  -RS Pain Scale: FACES Pre/Post-Treatment (Group)  -CJ          Pain Scale: FACES Pre/Post-Treatment    Pain: FACES Scale, Pretreatment 6-->hurts even more  -RS 0-->no hurt  -CJ       Posttreatment Pain Rating 6-->hurts even more  -RS 0-->no hurt  -CJ       Pain Location - Side/Orientation Bilateral  -RS --       Pain Location generalized  -RS --       Pain Location - chest  -RS --          Fiberoptic Endoscopic Evaluation of Swallowing (FEES)    Risks/Benefits Reviewed -- risks/benefits explained;patient;family;agreed  to eval  -CJ       Nasal Entry -- right:  -CJ       Scope serial number/identification -- [id number]  -CJ          Anatomy and Physiology    Anatomic Considerations -- edema;erythema  -CJ       Velopharyngeal -- WFL  -CJ       Base of Tongue -- symmetrical;range reduced  -CJ       Epiglottis -- rests posteriorly  -CJ       Laryngeal Function Breathing -- symmetrical  -CJ       Laryngeal Function Phonation -- symmetrical  -CJ       Laryngeal Function to Breath Hold -- incomplete closure  -CJ       Secretion Rating Scale (Lucio et al. 1996) -- 2- secretions initially outside the vestibule but later entered the vestibule  -CJ       Secretion Description -- thick;discolored  -CJ       Ice Chips -- elicited swallow;partially cleared secretions;aspirated  -CJ       Spontaneous Swallow -- frequency reduced  -CJ       Sensory -- reduced sensation  -CJ       Utensils Used -- Spoon;Cup;Straw  -CJ       Consistencies Trialed -- ice chips;thin liquids;nectar-thick liquids;pudding/puree;soft to chew;regular textures;spoon;cup;straw  -CJ          FEES Interpretation    Oral Phase -- prolonged manipulation;prespill of liquids into pharynx  -CJ          Initiation of Pharyngeal Swallow    Initiation of Pharyngeal Swallow -- bolus in pyriform sinuses  -CJ       Pharyngeal Phase -- impaired pharyngeal phase of swallowing  -CJ       Penetration Before the Swallow -- thin liquids;secondary to reduced back of tongue control;secondary to delayed swallow initiation or mistiming  -CJ       Aspiration During the Swallow -- thin liquids;secondary to delayed swallow initiation or mistiming;secondary to reduced laryngeal elevation;secondary to reduced vestibular closure;nectar-thick liquids  nectar via straw only  -CJ       Aspiration After the Swallow -- thin liquids;secondary to residue;in pyriform sinuses  -CJ       Depth of Penetration -- deep  -CJ       Response to Penetration -- No  -CJ       No spontaneous response to penetration and --  non-effective laryngeal clearance with cue (see comments)  -CJ       Response to Aspiration -- No  -CJ       No spontaneous response to aspiration with -- non-effective subglottic clearance with cue (see comments)  cued weak cough  -CJ       Rosenbek's Scale -- thin:;8-->Level 8  -CJ       Residue -- thin liquids;nectar-thick liquids;diffuse within pharynx;secondary to reduced posterior pharyngeal wall stripping;secondary to reduced laryngeal elevation;secondary to reduced hyolaryngeal excursion  worse w/thins and nectar via straw  -CJ       Response to Residue -- unable to clear residue;with cued swallow  -CJ       Attempted Compensatory Maneuvers -- bolus presentation style;bolus size;additional subsequent swallow;multiple swallows;throat clear after swallow  -CJ       Response to Attempted Compensatory Maneuvers -- prevented aspiration  -CJ       Successful Compensatory Maneuver Competency -- patient unable to adequately demonstrate/teach back compensations  -       FEES Summary -- Moderate pharyngeal dysphagia. Suspect mentation/timing/diffuse weakness impacting. Penetration w/ thin liquids before the swallow w/ subsequent silent aspiration during the swallow. Cued cough ineffective to clear. Silent aspiration w/ nectar via straw only during the swallow. Pt continued to silently aspirate thin residue after the swallow 2/2 inability to clear. No penetration or aspiration w/ puree/solid/nectar via spoon/cup. Okay for regular diet (as tolerated, may have to downgrade per pt's mentation) w/ nectar thick liquids, no straws, no mixed consistencies. Meds whole/crushed in puree/pudding.  -          SLP Evaluation Clinical Impression    SLP Swallowing Diagnosis -- moderate;pharyngeal dysphagia  -       Functional Impact -- risk of aspiration/pneumonia  -       Rehab Potential/Prognosis, Swallowing -- good, to achieve stated therapy goals  -       Swallow Criteria for Skilled Therapeutic Interventions Met --  demonstrates skilled criteria  -          SLP Treatment Clinical Impressions    Treatment Assessment (SLP) pharyngeal dysphagia  -RS --       Treatment Assessment Comments (SLP) Pt seen for tx. Great effort with exs. No s/sx with any PO trial. Appears ready for repeat instrumental to assess for upgrade  -RS --       Daily Summary of Progress (SLP) progress toward functional goals as expected  -RS --       Plan for Continued Treatment (SLP) continue treatment per plan of care  -RS --       Care Plan Review care plan/treatment goals reviewed  -RS --          Recommendations    Therapy Frequency (Swallow) 5 days per week  -RS 5 days per week  -       Predicted Duration Therapy Intervention (Days) until discharge  -RS until discharge  -       SLP Diet Recommendation regular textures;no mixed consistencies;nectar thick liquids  -RS regular textures;no mixed consistencies;nectar thick liquids  no straws  -       Recommended Diagnostics reassess via FEES  -RS --  ? [facility] eval pending mentation  -       Recommended Precautions and Strategies upright posture during/after eating;small bites of food and sips of liquid;no straw;multiple swallows per bite of food;multiple swallows per sip of liquid;general aspiration precautions;assist with feeding  -RS upright posture during/after eating;small bites of food and sips of liquid;no straw;multiple swallows per bite of food;multiple swallows per sip of liquid;general aspiration precautions;assist with feeding  -       Oral Care Recommendations Oral Care BID/PRN;Suction toothbrush  -RS Oral Care BID/PRN;Suction toothbrush  -       SLP Rec. for Method of Medication Administration meds whole;meds crushed;with puree;as tolerated  -RS meds whole;meds crushed;with puree;as tolerated  -       Monitor for Signs of Aspiration yes;notify SLP if any concerns  -RS yes;notify SLP if any concerns  -       Anticipated Discharge Disposition (SLP) inpatient rehabilitation facility   -RS inpatient rehabilitation facility  -CJ                 User Key  (r) = Recorded By, (t) = Taken By, (c) = Cosigned By      Initials Name Effective Dates    CJ Coty Blanchard, MS CCC-SLP 07/11/23 -     RS Jordon Blackwood MS CCC-SLP 09/14/23 -                     EDUCATION  The patient has been educated in the following areas:   Dysphagia (Swallowing Impairment) Modified Diet Instruction.        SLP GOALS       Row Name 05/01/24 0830 04/28/24 1330          (LTG) Patient will demonstrate functional swallow for    Diet Texture (Demonstrate functional swallow) regular textures  -RS regular textures  -CJ     Liquid viscosity (Demonstrate functional swallow) thin liquids  -RS thin liquids  -CJ     [place] (Demonstrate functional swallow) with minimal cues (75-90% accuracy)  -RS with minimal cues (75-90% accuracy)  -CJ     Time Frame (Demonstrate functional swallow) by discharge  -RS by discharge  -CJ     Progress/Outcomes (Demonstrate functional swallow) continuing progress toward goal  -RS new goal  -CJ        (STG) Patient will tolerate trials of    Consistencies Trialed (Tolerate trials) regular textures;nectar/ mildly thick liquids  -RS regular textures;nectar/ mildly thick liquids  -CJ     Desired Outcome (Tolerate trials) without signs/symptoms of aspiration;without signs of distress;with adequate oral prep/transit/clearance  -RS without signs/symptoms of aspiration;without signs of distress;with adequate oral prep/transit/clearance  -CJ     [place] (Tolerate trials) with minimal cues (75-90% accuracy)  -RS with minimal cues (75-90% accuracy)  -CJ     Time Frame (Tolerate trials) 1 week  -RS 1 week  -CJ     Progress/Outcomes (Tolerate trials) continuing progress toward goal  -RS new goal  -CJ     Comment (Tolerate trials) No s/sx with any reg/nectar trial with breakfast tray  -RS --        (STG) Lingual Strengthening Goal 1 (SLP)    Activity (Lingual Strengthening Goal 1, SLP) increase tongue back  strength  -RS increase tongue back strength  -CJ     Increase Tongue Back Strength lingual resistance exercises  -RS lingual resistance exercises  -CJ     [place]/Accuracy (Lingual Strengthening Goal 1, SLP) with moderate cues (50-74% accuracy)  -RS with moderate cues (50-74% accuracy)  -CJ     Time Frame (Lingual Strengthening Goal 1, SLP) 1 week  -RS 1 week  -CJ     Progress/Outcomes (Lingual Strengthening Goal 1, SLP) continuing progress toward goal  -RS new goal  -CJ     Comment (Lingual Strengthening Goal 1, SLP) x10  -RS --        (STG) Pharyngeal Strengthening Exercise Goal 1 (SLP)    Activity (Pharyngeal Strengthening Goal 1, SLP) increase tongue base retraction;increase squeeze/positive pressure generation;increase closure at entrance to airway/closure of airway at glottis;increase superior movement of the hyolaryngeal complex;increase anterior movement of the hyolaryngeal complex  -RS increase tongue base retraction;increase squeeze/positive pressure generation;increase closure at entrance to airway/closure of airway at glottis;increase superior movement of the hyolaryngeal complex;increase anterior movement of the hyolaryngeal complex  -CJ     Increase Superior Movement of the Hyolaryngeal Complex effortful pitch glide (falsetto + pharyngeal squeeze)  -RS effortful pitch glide (falsetto + pharyngeal squeeze)  -CJ     Increase Anterior Movement of the Hyolaryngeal Complex EMST  -RS EMST  -CJ     Increase Closure at Entrance to Airway/Closure of Airway at Glottis supraglottic swallow;breath hold exercises  -RS supraglottic swallow;breath hold exercises  -CJ     Increase Squeeze/Positive Pressure Generation hard effortful swallow  -RS hard effortful swallow  -CJ     Increase Tongue Base Retraction micha  -RS micha  -CJ     [place]/Accuracy (Pharyngeal Strengthening Goal 1, SLP) with moderate cues (50-74% accuracy)  -RS with moderate cues (50-74% accuracy)  -CJ     Time Frame (Pharyngeal  Strengthening Goal 1, SLP) 1 week  -RS 1 week  -CJ     Barriers (Pharyngeal Strengthening Goal 1, SLP) Exs practiced and reviewed. Difficulty with EMST and Reba  -RS --     Progress/Outcomes (Pharyngeal Strengthening Goal 1, SLP) -- new goal  -CJ               User Key  (r) = Recorded By, (t) = Taken By, (c) = Cosigned By      Initials Name Provider Type     Coty Blanchard MS CCC-SLP Speech and Language Pathologist    Jordon Buchanan MS CCC-SLP Speech and Language Pathologist                       Time Calculation:    Time Calculation- SLP       Row Name 05/01/24 1053             Time Calculation- SLP    SLP Start Time 0830  -RS      SLP Received On 05/01/24  -RS         Untimed Charges    [id number] Treatment Swallow Minutes 54  -RS         Total Minutes    Untimed Charges Total Minutes 54  -RS       Total Minutes 54  -RS                User Key  (r) = Recorded By, (t) = Taken By, (c) = Cosigned By      Initials Name Provider Type     Jordon Blackwood MS CCC-SLP Speech and Language Pathologist                    Therapy Charges for Today       Code Description Service Date Service Provider Modifiers Qty    [id number] HC ST TREATMENT SWALLOW 4 5/1/2024 Jordon Blackwood MS CCC-SLP GN 1                 Jordon Blackwood MS CCC-[place]  5/1/2024

## 2024-05-01 NOTE — CASE MANAGEMENT/SOCIAL WORK
Continued Stay Note   [facility]     Patient Name: Jermaine Singh  MRN: [id number]  Today's Date: 5/1/2024    Admit Date: 4/22/2024    Plan: rehab   Discharge Plan       Row Name 05/01/24 1424       Plan    Plan rehab    Patient/Family in Agreement with Plan yes    Plan Comments Discussed in MDR, nephrology consult today due to kidney function. I spoke w/patient and spouse, remains agreeable to rehab, @ this time leaning more toward [facility] in [place]. I faxed information to Jenna yesterday. Patient is not medically ready for d/c  CM will continue to follow.    Final Discharge Disposition Code 30 - still a patient                   Discharge Codes    No documentation.                 Expected Discharge Date and Time       Expected Discharge Date Expected Discharge Time    May 7, 2024               Reilly Davis RN

## 2024-05-01 NOTE — THERAPY TREATMENT NOTE
Patient Name: Jermaine Singh  : 1945    MRN: [id number]                              Today's Date: 2024       Admit Date: 2024    Visit Dx:     ICD-10-CM ICD-9-CM   1. Acute UTI  N39.0 599.0   2. Generalized weakness  R53.1 780.79   3. Edema of right lower extremity  R60.0 782.3   4. Leg erythema  L53.9 695.9   5. Chronic kidney disease, unspecified CKD stage  N18.9 585.9   6. Elevated troponin  R79.89 790.6   7. Hyperkalemia  E87.5 276.7   8. Cardiac arrest  I46.9 427.5   9. Oropharyngeal dysphagia  R13.12 787.22     Patient Active Problem List   Diagnosis    Type 2 diabetes mellitus with hyperglycemia    Hyperlipidemia LDL goal <70    Hypothyroidism (acquired)    Vitamin D deficiency on Rx     Diabetic neuropathy    Coronary artery disease involving native coronary artery of native heart with angina pectoris    Atherosclerosis of native artery of both lower extremities with intermittent claudication    Pacemaker    HFrEF (heart failure with reduced ejection fraction)    DVT, bilateral lower limbs    Cellulitis of right lower extremity    Anemia, chronic disease    PVD (peripheral vascular disease)    Diabetic foot ulcer    GERD without esophagitis    Stage 3b chronic kidney disease    Right inguinal hernia    PAF (paroxysmal atrial fibrillation)    Moderate malnutrition    UTI (urinary tract infection)    Cardiac arrest    Acute respiratory failure with hypoxia    History of DVT (deep vein thrombosis)    Elevated troponin level not due myocardial infarction     Past Medical History:   Diagnosis Date    Allergic     Arthritis     Asthma     Coronary artery disease     Diabetes mellitus 2000    started on inuslin 2018; started on po meds in ; checking blood sugars daily     Diabetic foot infection 2023    Disease of thyroid gland     po meds daily for hypothyroidism     Elevated serum creatinine 11/15/2022    Elevated troponin 10/02/2022    Generalized weakness 11/15/2022     History of fracture as a child     rt leg- severe     Hyperlipidemia     Hypertension     Hypothyroidism     PAF (paroxysmal atrial fibrillation) 07/23/2023    Peripheral neuropathy     Peripheral vascular disease     s/p angiogram 2/19-needs stent in left leg     Pleural effusion, bilateral 11/15/2022    Proximal phalanx fracture of the second digit extending into the second metatarsal joint 07/28/2022    RBBB     Right knee pain     Status post amputation of great toe and second toe of left foot 07/23/2023    Status post amputation of great toe, left 11/15/2022    Unstable angina 02/12/2019    Added automatically from request for surgery [id number]    Vitamin D deficiency      Past Surgical History:   Procedure Laterality Date    AMPUTATION DIGIT Left 01/17/2023    Procedure: AMPUTATION DIGIT LEFT;  Surgeon: Gopal Márquez MD;  Location:  [facility] OR;  Service: Vascular;  Laterality: Left;    APPENDECTOMY      CARDIAC CATHETERIZATION N/A 02/14/2019    Procedure: Left Heart Cath;  Surgeon: Cooper Apodaca MD;  Location: [facility] CATH INVASIVE LOCATION;  Service: Cardiology    CARDIAC ELECTROPHYSIOLOGY PROCEDURE N/A 05/18/2022    Procedure: DEVICE IMPLANT;  Surgeon: Cooper Apodaca MD;  Location: [facility] CATH INVASIVE LOCATION;  Service: Cardiology;  Laterality: N/A;    CARDIAC SURGERY      COLONOSCOPY      CORONARY ARTERY BYPASS GRAFT N/A 03/22/2019    Procedure: MEDIAN STERNOTOMY, CORONARY ARTERY BYPASS GRAFT X3, UTILIZING THE LEFT INTERNAL MAMMARY ARTERY, EVH AND OPEN HARVEST OF THE RIGHT GREATER SAPHENOUS VEIN, EXPLORATION OF THE LEFT LEG;  Surgeon: Ap Au MD;  Location: [facility] OR;  Service: Cardiothoracic    EYE SURGERY Bilateral     cataracts     FRACTURE SURGERY      INTERVENTIONAL RADIOLOGY PROCEDURE N/A 07/29/2021    Procedure: Abdominal Aortagram with Runoff;  Surgeon: Jermaine Hernandez MD;  Location: [facility] CATH INVASIVE LOCATION;  Service: Cardiovascular;  Laterality: N/A;    KNEE  ARTHROSCOPY      right x 2, left x 1    LACERATION REPAIR      right leg    LEG SURGERY      2 for fracture of rt leg     TONSILLECTOMY      Adnoidectomy    TRANS METATARSAL AMPUTATION Left 08/02/2022    Procedure: GREAT TOE AMPUTATION LEFT;  Surgeon: Gopal Márquez MD;  Location: [facility];  Service: Vascular;  Laterality: Left;      General Information       Row Name 05/01/24 1554          Physical Therapy Time and Intention    Document Type therapy note (daily note)  -     Mode of Treatment physical therapy  -       Row Name 05/01/24 5733          General Information    Patient Profile Reviewed yes  -     Existing Precautions/Restrictions fall;cardiac;oxygen therapy device and L/min;other (see comments)  -     Barriers to Rehab medically complex;previous functional deficit  -       Row Name 05/01/24 3906          Cognition    Orientation Status (Cognition) oriented x 3  -       Row Name 05/01/24 1550          Safety Issues, Functional Mobility    Safety Issues Affecting Function (Mobility) awareness of need for assistance;insight into deficits/self-awareness;safety precaution awareness;safety precautions follow-through/compliance;sequencing abilities  -     Impairments Affecting Function (Mobility) balance;endurance/activity tolerance;strength;postural/trunk control;pain;motor control;motor planning;shortness of breath  -               User Key  (r) = Recorded By, (t) = Taken By, (c) = Cosigned By      Initials Name Provider Type     Bessie Servin PT Physical Therapist                   Mobility       Row Name 05/01/24 8852          Bed Mobility    Bed Mobility supine-sit  -     Supine-Sit [place] (Bed Mobility) maximum assist (25% patient effort);2 person assist;verbal cues;nonverbal cues (demo/gesture)  -     Assistive Device (Bed Mobility) head of bed elevated;draw sheet;bed rails  -     Comment, (Bed Mobility) VCs for hand placement and sequencing. Required significant assist  at BLEs and trunk. Requires increased time d/t pain. Pt reported dizziness in sitting w/ BP increased to 94/65. RN present  -[facility] Name 05/01/24 1557          Transfers    Comment, (Transfers) VCs for hand placement and sequencing. /81 after transfer  -[facility] Name 05/01/24 1557          Bed-Chair Transfer    Bed-Chair [place] (Transfers) maximum assist (25% patient effort);2 person assist;verbal cues  -     Assistive Device (Bed-Chair Transfers) other (see comments)  B UE support  -     Comment, (Bed-Chair Transfer) SPT bed>chair. Cues for upright posture. Max cues for sequencing  -[facility] Name 05/01/24 1557          Sit-Stand Transfer    Sit-Stand [place] (Transfers) maximum assist (25% patient effort);2 person assist;verbal cues  -     Assistive Device (Sit-Stand Transfers) other (see comments)  B UE support  -     Comment, (Sit-Stand Transfer) STS from EOB  -LH       Row Name 05/01/24 1557          Gait/Stairs (Locomotion)    [place] Level (Gait) unable to assess  -     Comment, (Gait/Stairs) Deferred d/t instability during transfer and dizziness  -               User Key  (r) = Recorded By, (t) = Taken By, (c) = Cosigned By      Initials Name Provider Type     Bessie Servin, SETH Physical Therapist                   Obj/Interventions       [facility] Name 05/01/24 1600          Motor Skills    Therapeutic Exercise knee;ankle  deferred further ther ex d/t Afib - RN assumed care  -[facility] Name 05/01/24 1600          Knee (Therapeutic Exercise)    Knee (Therapeutic Exercise) strengthening exercise  -     Knee Strengthening (Therapeutic Exercise) bilateral;heel slides;5 repetitions  -[facility] Name 05/01/24 1600          Ankle (Therapeutic Exercise)    Ankle (Therapeutic Exercise) AROM (active range of motion)  -     Ankle AROM (Therapeutic Exercise) bilateral;dorsiflexion;plantarflexion;10 repetitions  -[facility] Name 05/01/24 1600          Balance     Balance Assessment sitting static balance;sitting dynamic balance;sit to stand dynamic balance;standing static balance;standing dynamic balance  -     Static Sitting Balance moderate assist  -     Dynamic Sitting Balance moderate assist  -     Position, Sitting Balance unsupported;sitting edge of bed  -     Sit to Stand Dynamic Balance maximum assist;2-person assist;verbal cues  -     Static Standing Balance moderate assist;2-person assist;verbal cues  -     Dynamic Standing Balance maximum assist;2-person assist;verbal cues  -     Position/Device Used, Standing Balance supported;other (see comments)  B UE support  -     Balance Interventions sitting;standing;sit to stand;supported;static;dynamic  -     Comment, Balance Posterior lean noted in sitting that progressed to CGA for balance. Pt holds head in cervical flexion throughout  -               User Key  (r) = Recorded By, (t) = Taken By, (c) = Cosigned By      Initials Name Provider Type     Bessie Servin PT Physical Therapist                   Goals/Plan    No documentation.                  Clinical Impression       [facility] Name 05/01/24 1603          Pain    Pain Intervention(s) Repositioned;Ambulation/increased activity  -     Additional Documentation Pain Scale: FACES Pre/Post-Treatment (Group)  -[facility] Name 05/01/24 1603          Pain Scale: FACES Pre/Post-Treatment    Pain: FACES Scale, Pretreatment 0-->no hurt  -     Posttreatment Pain Rating 4-->hurts little more  -     Pain Location - Side/Orientation Bilateral  -     Pain Location generalized  -     Pain Location - chest  -     Pre/Posttreatment Pain Comment pain from chest compressions  -[facility] Name 05/01/24 1603          Plan of Care Review    Plan of Care Reviewed With patient  -     Progress no change  -     Outcome Evaluation Pt continues to present below baseline function d/t dizziness, pain, generalized weakness, decreased balance, and decreased  activity tolerance. Pt performed STS and SPT w/ B UE support, maxA x2. Pt would cont to benefit from skilled IP PT. Rec IRF at d/c.  -       Row Name 05/01/24 1603          Vital Signs    Pre Systolic BP Rehab 80  -LH     Pre Treatment Diastolic BP 56  -LH     Intra Systolic BP Rehab 94  -LH     Intra Treatment Diastolic BP 65  -LH     Post Systolic BP Rehab 115  -LH     Post Treatment Diastolic BP 83  -LH     Pretreatment Heart Rate (beats/min) 60  -LH     Intratreatment Heart Rate (beats/min) 120  Pt going into/out of A-fib. RN present and assumed care  -LH     Posttreatment Heart Rate (beats/min) 98  -LH     Pre SpO2 (%) 97  -LH     O2 Delivery Pre Treatment room air  -LH     O2 Delivery Intra Treatment room air  -LH     Post SpO2 (%) 100  -LH     O2 Delivery Post Treatment room air  -LH     Pre Patient Position Supine  -     Intra Patient Position Standing  -     Post Patient Position Sitting  -       Row Name 05/01/24 1603          Positioning and Restraints    Pre-Treatment Position in bed  -     Post Treatment Position chair  -     In Chair notified nsg;reclined;sitting;call light within reach;encouraged to call for assist;exit alarm on;waffle cushion;with nsg;legs elevated;on mechanical lift sling  -               User Key  (r) = Recorded By, (t) = Taken By, (c) = Cosigned By      Initials Name Provider Type     Bessie Servin, PT Physical Therapist                   Outcome Measures       Row Name 05/01/24 1606          How much help from another person do you currently need...    Turning from your back to your side while in flat bed without using bedrails? 2  -LH     Moving from lying on back to sitting on the side of a flat bed without bedrails? 2  -LH     Moving to and from a bed to a chair (including a wheelchair)? 2  -LH     Standing up from a chair using your arms (e.g., wheelchair, bedside chair)? 2  -LH     Climbing 3-5 steps with a railing? 1  -LH     To walk in hospital room? 1   -     AM-PAC 6 Clicks Score (PT) 10  -     Highest Level of Mobility Goal 4 --> Transfer to chair/commode  -       Row Name 05/01/24 1606          Functional Assessment    Outcome Measure Options AM-PAC 6 Clicks Basic Mobility (PT)  -               User Key  (r) = Recorded By, (t) = Taken By, (c) = Cosigned By      Initials Name Provider Type     Bessie Servin PT Physical Therapist                                 Physical Therapy Education       Title: PT OT SLP Therapies (In Progress)       Topic: Physical Therapy (In Progress)       Point: Mobility training (In Progress)       Learning Progress Summary             Patient Acceptance, E, NR by  at 5/1/2024 1606    Acceptance, E, NR by  at 4/30/2024 1153    Acceptance, E, NR by  at 4/29/2024 1159    Eager, E, VU,DU,NR by  at 4/24/2024 1452    Comment: Reviewed safety/technique w/transfers, ambulation, HEP, PT POC    Acceptance, E, VU by  at 4/23/2024 1120   Family Acceptance, E, NR by  at 4/30/2024 1153    Acceptance, E, NR by  at 4/29/2024 1159    Acceptance, E, VU by  at 4/23/2024 1120                         Point: Home exercise program (In Progress)       Learning Progress Summary             Patient Acceptance, E, NR by  at 5/1/2024 1606    Acceptance, E, NR by  at 4/30/2024 1153    Eager, E, VU,DU,NR by  at 4/24/2024 1452    Comment: Reviewed safety/technique w/transfers, ambulation, HEP, PT POC   Family Acceptance, E, NR by  at 4/30/2024 1153                         Point: Body mechanics (In Progress)       Learning Progress Summary             Patient Acceptance, E, NR by  at 5/1/2024 1606    Acceptance, E, NR by  at 4/30/2024 1153    Acceptance, E, NR by  at 4/29/2024 1159    Eager, E, VU,DU,NR by  at 4/24/2024 1452    Comment: Reviewed safety/technique w/transfers, ambulation, HEP, PT POC    Acceptance, E, VU by  at 4/23/2024 1120   Family Acceptance, E, NR by  at 4/30/2024 1153    Acceptance, E, NR by LH at  4/29/2024 1159    Acceptance, E, VU by KR at 4/23/2024 1120                         Point: Precautions (In Progress)       Learning Progress Summary             Patient Acceptance, E, NR by  at 5/1/2024 1606    Acceptance, E, NR by  at 4/30/2024 1153    Acceptance, E, NR by  at 4/29/2024 1159    Eager, E, VU,DU,NR by  at 4/24/2024 1452    Comment: Reviewed safety/technique w/transfers, ambulation, HEP, PT POC    Acceptance, E, VU by KR at 4/23/2024 1120   Family Acceptance, E, NR by  at 4/30/2024 1153    Acceptance, E, NR by  at 4/29/2024 1159    Acceptance, E, VU by  at 4/23/2024 1120                                         User Key       Initials Effective Dates Name Provider Type Discipline     06/01/21 -  Jenna Stanley, PT Physical Therapist PT     12/30/22 -  Tiffanie Erwin, PT Physical Therapist PT     09/21/23 -  Bessie Servin, PT Physical Therapist PT                  PT Recommendation and Plan  Planned Therapy Interventions (PT): balance training, bed mobility training, gait training, home exercise program, neuromuscular re-education, patient/family education, postural re-education, ROM (range of motion), strengthening, stretching, stair training, transfer training  Plan of Care Reviewed With: patient  Progress: no change  Outcome Evaluation: Pt continues to present below baseline function d/t dizziness, pain, generalized weakness, decreased balance, and decreased activity tolerance. Pt performed STS and SPT w/ B UE support, maxA x2. Pt would cont to benefit from skilled IP PT. Rec IRF at d/c.     Time Calculation:         PT Charges       Row Name 05/01/24 1606             Time Calculation    Start Time 1520  -LH      PT Received On 05/01/24  -      PT Goal Re-Cert Due Date 05/09/24  -         Timed Charges    62463 - PT Therapeutic Exercise Minutes 8  -LH      56184 - PT Therapeutic Activity Minutes 15  -LH         Total Minutes    Timed Charges Total Minutes 23  -        Total Minutes 23  -LH                User Key  (r) = Recorded By, (t) = Taken By, (c) = Cosigned By      Initials Name Provider Type     Bessie Servin, PT Physical Therapist                  Therapy Charges for Today       Code Description Service Date Service Provider Modifiers Qty    48618901302  PT THER PROC EA 15 MIN 4/30/2024 Bessie Servin, PT GP 1    19596362981 HC PT THER PROC EA 15 MIN 5/1/2024 Bessie Servin, PT GP 1    [id number]  PT THERAPEUTIC ACT EA 15 MIN 5/1/2024 Bessie Servin, PT GP 1            PT G-Codes  Outcome Measure Options: AM-PAC 6 Clicks Basic Mobility (PT)  AM-PAC 6 Clicks Score (PT): 10  AM-PAC 6 Clicks Score (OT): 12  Modified [place] Scale: 4 - Moderately severe disability.  Unable to walk without assistance, and unable to attend to own bodily needs without assistance.  PT Discharge Summary  Anticipated Discharge Disposition (PT): inpatient rehabilitation facility    Bessie Servin PT  5/1/2024

## 2024-05-01 NOTE — PLAN OF CARE
Goal Outcome Evaluation:  Plan of Care Reviewed With: patient        Progress: improving  Outcome Evaluation: Pt continues to demo need for IPOT services to address deficits in functional mobility, balance, strength and self-care. Will continue POC to progress towards goals. d/c rec is IPR.      Anticipated Discharge Disposition (OT): inpatient rehabilitation facility

## 2024-05-01 NOTE — THERAPY TREATMENT NOTE
Patient Name: Jermaine Singh  : 1945    MRN: [id number]                              Today's Date: 2024       Admit Date: 2024    Visit Dx:     ICD-10-CM ICD-9-CM   1. Acute UTI  N39.0 599.0   2. Generalized weakness  R53.1 780.79   3. Edema of right lower extremity  R60.0 782.3   4. Leg erythema  L53.9 695.9   5. Chronic kidney disease, unspecified CKD stage  N18.9 585.9   6. Elevated troponin  R79.89 790.6   7. Hyperkalemia  E87.5 276.7   8. Cardiac arrest  I46.9 427.5   9. Oropharyngeal dysphagia  R13.12 787.22     Patient Active Problem List   Diagnosis    Type 2 diabetes mellitus with hyperglycemia    Hyperlipidemia LDL goal <70    Hypothyroidism (acquired)    Vitamin D deficiency on Rx     Diabetic neuropathy    Coronary artery disease involving native coronary artery of native heart with angina pectoris    Atherosclerosis of native artery of both lower extremities with intermittent claudication    Pacemaker    HFrEF (heart failure with reduced ejection fraction)    DVT, bilateral lower limbs    Cellulitis of right lower extremity    Anemia, chronic disease    PVD (peripheral vascular disease)    Diabetic foot ulcer    GERD without esophagitis    Stage 3b chronic kidney disease    Right inguinal hernia    PAF (paroxysmal atrial fibrillation)    Moderate malnutrition    UTI (urinary tract infection)    Cardiac arrest    Acute respiratory failure with hypoxia    History of DVT (deep vein thrombosis)    Elevated troponin level not due myocardial infarction     Past Medical History:   Diagnosis Date    Allergic     Arthritis     Asthma     Coronary artery disease     Diabetes mellitus 2000    started on inuslin 2018; started on po meds in ; checking blood sugars daily     Diabetic foot infection 2023    Disease of thyroid gland     po meds daily for hypothyroidism     Elevated serum creatinine 11/15/2022    Elevated troponin 10/02/2022    Generalized weakness 11/15/2022     History of fracture as a child     rt leg- severe     Hyperlipidemia     Hypertension     Hypothyroidism     PAF (paroxysmal atrial fibrillation) 07/23/2023    Peripheral neuropathy     Peripheral vascular disease     s/p angiogram 2/19-needs stent in left leg     Pleural effusion, bilateral 11/15/2022    Proximal phalanx fracture of the second digit extending into the second metatarsal joint 07/28/2022    RBBB     Right knee pain     Status post amputation of great toe and second toe of left foot 07/23/2023    Status post amputation of great toe, left 11/15/2022    Unstable angina 02/12/2019    Added automatically from request for surgery [id number]    Vitamin D deficiency      Past Surgical History:   Procedure Laterality Date    AMPUTATION DIGIT Left 01/17/2023    Procedure: AMPUTATION DIGIT LEFT;  Surgeon: Gopal Márquez MD;  Location:  [facility];  Service: Vascular;  Laterality: Left;    APPENDECTOMY      CARDIAC CATHETERIZATION N/A 02/14/2019    Procedure: Left Heart Cath;  Surgeon: Cooper Apodaca MD;  Location: [facility];  Service: Cardiology    CARDIAC ELECTROPHYSIOLOGY PROCEDURE N/A 05/18/2022    Procedure: DEVICE IMPLANT;  Surgeon: Cooper Apodaca MD;  Location: [facility];  Service: Cardiology;  Laterality: N/A;    CARDIAC SURGERY      COLONOSCOPY      CORONARY ARTERY BYPASS GRAFT N/A 03/22/2019    Procedure: MEDIAN STERNOTOMY, CORONARY ARTERY BYPASS GRAFT X3, UTILIZING THE LEFT INTERNAL MAMMARY ARTERY, EVH AND OPEN HARVEST OF THE RIGHT GREATER SAPHENOUS VEIN, EXPLORATION OF THE LEFT LEG;  Surgeon: Ap Au MD;  Location: [facility];  Service: Cardiothoracic    EYE SURGERY Bilateral     cataracts     FRACTURE SURGERY      INTERVENTIONAL RADIOLOGY PROCEDURE N/A 07/29/2021    Procedure: Abdominal Aortagram with Runoff;  Surgeon: Jermaine Hernandez MD;  Location: [facility];  Service: Cardiovascular;  Laterality: N/A;    KNEE  ARTHROSCOPY      right x 2, left x 1    LACERATION REPAIR      right leg    LEG SURGERY      2 for fracture of rt leg     TONSILLECTOMY      Adnoidectomy    TRANS METATARSAL AMPUTATION Left 08/02/2022    Procedure: GREAT TOE AMPUTATION LEFT;  Surgeon: Gopal Márquez MD;  Location: [facility];  Service: Vascular;  Laterality: Left;      General Information       Row Name 05/01/24 1538          OT Time and Intention    Document Type therapy note (daily note)  -     Mode of Treatment occupational therapy  -       Row Name 05/01/24 1538          General Information    Patient Profile Reviewed yes  -     Existing Precautions/Restrictions fall;cardiac;oxygen therapy device and L/min;other (see comments)  ART line, RLE wounds, orthostatic hypotension, monitor BP  -     Barriers to Rehab medically complex;previous functional deficit  -       Row Name 05/01/24 1538          Cognition    Orientation Status (Cognition) oriented x 3  -       Row Name 05/01/24 1538          Safety Issues, Functional Mobility    Safety Issues Affecting Function (Mobility) awareness of need for assistance;insight into deficits/self-awareness;safety precaution awareness;safety precautions follow-through/compliance;sequencing abilities  -     Impairments Affecting Function (Mobility) balance;endurance/activity tolerance;strength;postural/trunk control;pain;motor control;motor planning;shortness of breath  -               User Key  (r) = Recorded By, (t) = Taken By, (c) = Cosigned By      Initials Name Provider Type    KL Naye Salinas OT Occupational Therapist                   Lymphedema       Row Name 04/29/24 3712             Lymphedema Edema Assessment    Ptting Edema Category By severity  -      Pitting Edema Mild  -      Recorded by [MF] Cooper Ni, PT              Compression/Skin Care    Compression/Skin Care skin care;wrapping location;bandaging  -      Skin Care washed/dried;lotion applied  -      Wrapping  Location lower extremity  -MF      Wrapping Location LE bilateral:;foot to knee  -MF      Wrapping Comments RLE unna boot with coban and spandage  -MF      Recorded by [MF] Cooper Ni, PT                User Key  (r) = Recorded By, (t) = Taken By, (c) = Cosigned By      Initials Name Effective Dates    Cooper Isabel PT 02/03/23 -                    Mobility/ADL's       Row Name 05/01/24 1538          Bed Mobility    Supine-Sit [place] (Bed Mobility) maximum assist (25% patient effort);2 person assist;verbal cues;nonverbal cues (demo/gesture)  -     Assistive Device (Bed Mobility) head of bed elevated;draw sheet;bed rails  -       Row Name 05/01/24 1538          Transfers    Transfers sit-stand transfer;stand-sit transfer;bed-chair transfer  -       Row Name 05/01/24 1538          Bed-Chair Transfer    Bed-Chair [place] (Transfers) maximum assist (25% patient effort);2 person assist;verbal cues  -KL     Assistive Device (Bed-Chair Transfers) other (see comments)  BUE support  -KL       Row Name 05/01/24 1538          Sit-Stand Transfer    Sit-Stand [place] (Transfers) maximum assist (25% patient effort);2 person assist;verbal cues  -KL     Assistive Device (Sit-Stand Transfers) other (see comments)  BUE support  -KL       Row Name 05/01/24 1538          Stand-Sit Transfer    Stand-Sit [place] (Transfers) 2 person assist;verbal cues;moderate assist (50% patient effort)  -KL     Assistive Device (Stand-Sit Transfers) other (see comments)  BUE support  -               User Key  (r) = Recorded By, (t) = Taken By, (c) = Cosigned By      Initials Name Provider Type    Naye Davila OT Occupational Therapist                   Obj/Interventions       Row Name 05/01/24 1539          Balance    Static Sitting Balance moderate assist  -KL     Dynamic Sitting Balance moderate assist  -REECE     Position, Sitting Balance sitting edge of bed;supported  -KL     Static Standing Balance  moderate assist  -     Dynamic Standing Balance maximum assist  -     Position/Device Used, Standing Balance supported  -     Balance Interventions sitting;standing;sit to stand;supported;static;dynamic;occupation based/functional task  -               User Key  (r) = Recorded By, (t) = Taken By, (c) = Cosigned By      Initials Name Provider Type    Naye Davila OT Occupational Therapist                   Goals/Plan    No documentation.                  Clinical Impression       [facility] Name 05/01/24 1540          Pain Assessment    Pre/Posttreatment Pain Comment c/o dizziness upon sitting EOB  -[facility] Name 05/01/24 1540          Pain Scale: FACES Pre/Post-Treatment    Pain: FACES Scale, Pretreatment 0-->no hurt  -     Posttreatment Pain Rating 4-->hurts little more  -     Pain Location - chest  -[facility] Name 05/01/24 1540          Plan of Care Review    Plan of Care Reviewed With patient  -     Progress improving  -     Outcome Evaluation Pt continues to demo need for IPOT services to address deficits in functional mobility, balance, strength and self-care. Will continue POC to progress towards goals. d/c rec is IPR.  -[facility] Name 05/01/24 1540          Therapy Plan Review/Discharge Plan (OT)    Anticipated Discharge Disposition (OT) inpatient rehabilitation facility  -[facility] Name 05/01/24 1548          Vital Signs    Pre Systolic BP Rehab 80  -KL     Pre Treatment Diastolic BP 56  -KL     Post Systolic BP Rehab 108  -KL     Post Treatment Diastolic BP 81  -KL     Pretreatment Heart Rate (beats/min) 60  -KL     Posttreatment Heart Rate (beats/min) 100  -KL     Pre SpO2 (%) 97  -KL     O2 Delivery Pre Treatment room air  -     Post SpO2 (%) 100  -KL     O2 Delivery Post Treatment room air  -     Pre Patient Position Supine  -     Intra Patient Position Standing  -KL     Post Patient Position Sitting  -[facility] Name 05/01/24 6756          Positioning and Restraints     Pre-Treatment Position in bed  -KL     Post Treatment Position chair  -KL     In Chair reclined;sitting;call light within reach;encouraged to call for assist;exit alarm on;waffle cushion;with nsg;with PT;on mechanical lift sling  -KL               User Key  (r) = Recorded By, (t) = Taken By, (c) = Cosigned By      Initials Name Provider Type    Naye Davila OT Occupational Therapist                   Outcome Measures       Row Name 05/01/24 3199          How much help from another is currently needed...    Putting on and taking off regular lower body clothing? 2  -KL     Bathing (including washing, rinsing, and drying) 2  -KL     Toileting (which includes using toilet bed pan or urinal) 2  -KL     Putting on and taking off regular upper body clothing 2  -KL     Taking care of personal grooming (such as brushing teeth) 2  -KL     Eating meals 2  -KL     AM-PAC 6 Clicks Score (OT) 12  -KL               User Key  (r) = Recorded By, (t) = Taken By, (c) = Cosigned By      Initials Name Provider Type    Naye Davila OT Occupational Therapist                    Occupational Therapy Education       Title: PT OT SLP Therapies (In Progress)       Topic: Occupational Therapy (In Progress)       Point: ADL training (In Progress)       Description:   Instruct learner(s) on proper safety adaptation and remediation techniques during self care or transfers.   Instruct in proper use of assistive devices.                  Learning Progress Summary             Patient Acceptance, E, NR by  at 4/30/2024 1006    Acceptance, E, VU by MR at 4/25/2024 1314    Acceptance, E, VU by AN at 4/23/2024 1344                         Point: Home exercise program (Done)       Description:   Instruct learner(s) on appropriate technique for monitoring, assisting and/or progressing therapeutic exercises/activities.                  Learning Progress Summary             Patient Acceptance, E, VU by MR at 4/25/2024 1314    Acceptance, E, VU by AN  at 4/23/2024 1344                         Point: Precautions (In Progress)       Description:   Instruct learner(s) on prescribed precautions during self-care and functional transfers.                  Learning Progress Summary             Patient Acceptance, E, NR by  at 5/1/2024 1542    Acceptance, E, NR by  at 4/30/2024 1006    Acceptance, E, NR by KL at 4/29/2024 1131    Acceptance, E, VU by MR at 4/25/2024 1314    Acceptance, E, VU by AN at 4/23/2024 1344   Significant Other Acceptance, E, NR by KL at 4/29/2024 1131                         Point: Body mechanics (In Progress)       Description:   Instruct learner(s) on proper positioning and spine alignment during self-care, functional mobility activities and/or exercises.                  Learning Progress Summary             Patient Acceptance, E, NR by  at 5/1/2024 1542    Acceptance, E, NR by  at 4/30/2024 1006    Acceptance, E, NR by  at 4/29/2024 1131    Acceptance, E, VU by  at 4/25/2024 1314    Acceptance, E, VU by AN at 4/23/2024 1344   Significant Other Acceptance, E, NR by  at 4/29/2024 1131                                         User Key       Initials Effective Dates Name Provider Type Discipline     10/14/22 -  Stacy Sevilla, OT Occupational Therapist OT    AN 09/21/21 -  Gin Jaeger, OT Occupational Therapist OT    MR 09/22/22 -  Rosalinda Silva, OT Occupational Therapist OT     02/05/24 -  Naye Salinas OT Occupational Therapist OT                  OT Recommendation and Plan  Planned Therapy Interventions (OT): activity tolerance training, adaptive equipment training, functional balance retraining, occupation/activity based interventions, patient/caregiver education/training, ROM/therapeutic exercise, transfer/mobility retraining, strengthening exercise  Therapy Frequency (OT): daily  Plan of Care Review  Plan of Care Reviewed With: patient  Progress: improving  Outcome Evaluation: Pt continues to demo need for IPOT  services to address deficits in functional mobility, balance, strength and self-care. Will continue POC to progress towards goals. d/c rec is IPR.     Time Calculation:         Time Calculation- OT       Row Name 05/01/24 1542             Time Calculation- OT    OT Start Time 1513  -KL      OT Received On 05/01/24  -KL         Timed Charges    39251 - OT Therapeutic Activity Minutes 15  -KL         Total Minutes    Timed Charges Total Minutes 15  -KL       Total Minutes 15  -KL                User Key  (r) = Recorded By, (t) = Taken By, (c) = Cosigned By      Initials Name Provider Type    Naye Davila OT Occupational Therapist                  Therapy Charges for Today       Code Description Service Date Service Provider Modifiers Qty    96529682150 HC OT THERAPEUTIC ACT EA 15 MIN 5/1/2024 Naye Salinas OT GO 1                 Naye Salinas OT  5/1/2024

## 2024-05-01 NOTE — PROGRESS NOTES
" [facility] - Progress Note    Jermaine Singh  1945  [id number]    05/01/24, 08:46 EDT      Chief Complaint: Following for cardiac arrest    Subjective:   Sleeping in bed, no family currently present.    No new events.  Slight rise in Cr  Remains on RA.  Still requiring low dose pressor support.    No further ventricular arrhythmia.      Review of Systems:  Pertinent positives are listed above and in physical exam.  All others have been reviewed and are negative.    [Held by provider] apixaban, 5 mg, Oral, Q12H  aspirin, 81 mg, Oral, Daily  atorvastatin, 20 mg, Oral, Nightly  azelastine, 2 spray, Each Nare, BID  castor oil-balsam peru, 1 Application, Topical, Q12H  heparin (porcine), 5,000 Units, Subcutaneous, Q8H  insulin glargine, 8 Units, Subcutaneous, Nightly  Insulin Lispro, 2-9 Units, Subcutaneous, 4x Daily AC & at Bedtime  lactobacillus acidophilus, 1 capsule, Oral, Daily  levothyroxine, 88 mcg, Oral, Q AM  midodrine, 10 mg, Oral, TID AC  pantoprazole, 40 mg, Oral, Q AM  polyethylene glycol, 17 g, Oral, Daily  senna-docusate sodium, 2 tablet, Oral, BID  sodium chloride, 10 mL, Intravenous, Q12H  tamsulosin, 0.4 mg, Oral, Daily        Objective:  Vitals:   height is 190 cm (74.8\") and weight is 88.5 kg (195 lb 1.7 oz). His bladder temperature is 97.9 °F (36.6 °C). His blood pressure is 105/50 and his pulse is 75. His respiration is 16 and oxygen saturation is 99%.     Intake/Output Summary (Last 24 hours) at 5/1/2024 0842  Last data filed at 5/1/2024 0532  Gross per 24 hour   Intake 619.2 ml   Output 455 ml   Net 164.2 ml       Physical Exam:  General:  WN, Appears stated age  CV:  Normal S1,S2. No murmur, rub, or gallop. + Pacemaker  Resp:  CTA [place], equal, nonlabored.  Abd:  Soft, + BS, no organomegaly. Nontender to palpation.  Extrem:  RLE with wrap, trace LLE.           Results from last 7 days   Lab Units 05/01/24  0339   WBC 10*3/mm3 8.92   HEMOGLOBIN g/dL 9.0*   HEMATOCRIT % 27.0* " "  PLATELETS 10*3/mm3 263     Results from last 7 days   Lab Units 05/01/24  0339   SODIUM mmol/L 143   POTASSIUM mmol/L 4.5   CHLORIDE mmol/L 112*   CO2 mmol/L 18.0*   BUN mg/dL 84*   CREATININE mg/dL 3.39*   CALCIUM mg/dL 7.6*   BILIRUBIN mg/dL 0.4   ALK PHOS U/L 72   ALT (SGPT) U/L 165*   AST (SGOT) U/L 20   GLUCOSE mg/dL 94                         Tele: V paced      Assessment/Plan:  Cardiac arrest/VF arrest  Substantial substrate for VF arrest including ischemic heart disease with severely diffusely diseased LAD as well as newly identified severe LV dysfunction with EF <20%  Films reviewed by cards 4/25; \"Based on prebypass angiogram, I do not suspect readily \"intervenable\" lesions to correct ischemic substrate.  LAD is severely diffusely diseased and would not be suitable for percutaneous treatment.  Frankly, I am impressed that Dr. Au was able to bypass the LAD in 2019.\"  Recommend against emergent cardiac catheterization given his hemodynamic instability and unlikelihood of \"targets\" to correct ischemic substrate  Continue aspirin and statin  No beta-blocker due to hypotension, resume as pressor support weaned/BP tolerates  Discontinue IV amiodarone.  If recurrence of VT/VF, start lidocaine  Repeat echo     Severe LV dysfunction with EF <20%  Newly diagnosed.  Last LVEF 48% in 2022  Unclear whether this is related to stunning from VF arrest or whether this has been pre-existing his admission.  I suspect the latter as patient has had dwindling functional capacity  Presently with some element of cardiogenic shock which prohibits GDMT  SJ pacemaker interrogation reviewed  Repeat echo pending, will review     Multifactorial shock  Cardiogenic, possibly septic  Continue norepinephrine his primary pressor, epinephrine is secondary pressor  Wean vasopressors to maintain MAP >65 mmHg     Elevated troponin, POA  Initial troponin measurements on admission elevated but flat and not consistent with myocardial " infarction  If troponin is repeated, I suspect will be elevated but difficult to determine whether related to a type I MI or type II MI due to fixed CAD, CHF, and arrhythmia     Type 2 diabetes mellitus not on insulin with hyperglycemia  Uncontrolled (untreated?) diabetes prior to admission  Has only not candidate for SGL 2 inhibitor due to renal insufficiency  Start Glucomander protocol     Hyperlipidemia with goal LDL <70  LDL well-controlled  Continue to hold statin although LFTs are improving.     Stage IIIb CKD  Hold losartan due to hypotension and anticipated [place] from cardiac event  Rise in Cr, 3.39 today.     History of DVT  Continue to hold apixaban due to worsening renal insufficiency      Paroxysmal Atrial Fibrillation  [place] device interrogated Friday, reviewed.  Report on  chart.  Holding NOAC      I discussed the patient's findings and my recommendations with the patient, any present family members, and the nursing staff.  Cooper Apodaca MD saw and examined patient, verified hx and PE, read all radiographic studies, reviewed labs and micro data, and formulated dx, plan for treatment and all medical decision making.      Megan Shukla PA-C  05/01/24, 08:46 EDT

## 2024-05-01 NOTE — PLAN OF CARE
Goal Outcome Evaluation:  Plan of Care Reviewed With: patient        Progress: improving         Anticipated Discharge Disposition (SLP): inpatient rehabilitation facility             Treatment Assessment (SLP): pharyngeal dysphagia (05/01/24 0830)  Treatment Assessment Comments (SLP): Pt seen for tx. Great effort with exs. No s/sx with any PO trial. Appears ready for repeat instrumental to assess for upgrade (05/01/24 0830)  Plan for Continued Treatment (SLP): continue treatment per plan of care (05/01/24 0830)

## 2024-05-01 NOTE — PLAN OF CARE
Goal Outcome Evaluation:  Plan of Care Reviewed With: patient        Progress: no change  Outcome Evaluation: Pt continues to present below baseline function d/t dizziness, pain, generalized weakness, decreased balance, and decreased activity tolerance. Pt performed STS and SPT w/ B UE support, maxA x2. Pt would cont to benefit from skilled IP PT. Rec IRF at d/c.      Anticipated Discharge Disposition (PT): inpatient rehabilitation facility

## 2024-05-01 NOTE — PROGRESS NOTES
Intensive Care Follow-up     Hospital:  LOS: 8 days   Mr. Jermaine Singh, 79 y.o. male is followed for:   Cardiac arrest            History of present illness:    80yo M with a history of T2DM, Stage 3 CKD, bilateral DVTs, paroxysmal Afib, SSS s/p post-pacemaker placement, CHF, PVD, and left diabetic foot infection who presented to [facility] on 4/22/24 with weakness and a chronic wound on his RLE. UA was consistent with a UTI. He was started on Rocephin and admitted to Hospital Medicine.      On 4/25/24, he suffered a Vfib arrest and was intubated during CPR. He was transferred to the ICU. He was able to be extubated on 4/26/24. After extubation, his respiratory status was tenuous and family decided to make him a DNR/DNI.  Patient was seen by infectious disease and on antibiotics.  Also has been requiring norepinephrine at low-dose.  Midodrine has been started.  Patient also developed shock liver which seems to be recovering.    Subjective   Interval History:  Patient much more awake today morning.  Urine output is decreased.  Creatinine further worse.  Requiring higher dose of norepinephrine currently.             The patient's past medical, surgical and social history were reviewed and updated in Epic as appropriate.       Objective     Infusions:  norepinephrine, 0.02-0.3 mcg/kg/min, Last Rate: Stopped (05/01/24 1010)      Medications:  [Held by provider] apixaban, 5 mg, Oral, Q12H  aspirin, 81 mg, Oral, Daily  atorvastatin, 20 mg, Oral, Nightly  [START ON 5/2/2024] castor oil-balsam peru, 1 Application, Topical, Daily  heparin (porcine), 5,000 Units, Subcutaneous, Q8H  insulin glargine, 8 Units, Subcutaneous, Nightly  Insulin Lispro, 2-9 Units, Subcutaneous, 4x Daily AC & at Bedtime  lactobacillus acidophilus, 1 capsule, Oral, Daily  levothyroxine, 88 mcg, Oral, Q AM  midodrine, 10 mg, Oral, TID AC  pantoprazole, 40 mg, Oral, Q AM  polyethylene glycol, 17 g, Oral, Daily  senna-docusate sodium, 2 tablet, Oral,  "BID  sodium chloride, 10 mL, Intravenous, Q12H  tamsulosin, 0.4 mg, Oral, Daily        Vital Sign Min/Max for last 24 hours  Temp  Min: 97.3 °F (36.3 °C)  Max: 97.9 °F (36.6 °C)   BP  Min: 83/51  Max: 111/71   Pulse  Min: 59  Max: 75   Resp  Min: 16  Max: 18   SpO2  Min: 95 %  Max: 100 %   No data recorded       Input/Output for last 24 hour shift  04/30 0701 - 05/01 0700  In: 619.2 [P.O.:250; I.V.:169.2]  Out: 455 [Urine:455]      Objective:  Vital signs: (most recent): Blood pressure 105/50, pulse 75, temperature 97.9 °F (36.6 °C), temperature source Bladder, resp. rate 16, height 190 cm (74.8\"), weight 88.5 kg (195 lb 1.7 oz), SpO2 99%.            General Appearance: Awake, alert, in no acute distress  Lungs:   B/L Breath sounds present with decreased breath sounds on bases, no wheezing heard, no crackles.   Heart: S1 and S2 present, no murmur  Abdomen: Soft, nontender, no guarding or rigidity, bowel sounds positive.  Extremities:   Trace edema, warm to touch.  Right lower extremity dressing intact  Neurologic:  Moving all four extremities. Good strength bilaterally.      Results from last 7 days   Lab Units 05/01/24  0339 04/30/24  0447 04/29/24  0455   WBC 10*3/mm3 8.92 9.12 9.28   HEMOGLOBIN g/dL 9.0* 8.8* 7.9*   PLATELETS 10*3/mm3 263 232 205     Results from last 7 days   Lab Units 05/01/24  0339 04/30/24  0447 04/29/24  0455 04/28/24  0405 04/27/24  0607 04/26/24  0410   SODIUM mmol/L 143 140 142   < > 142 137   POTASSIUM mmol/L 4.5 4.3 4.0   < > 4.7 4.8   CO2 mmol/L 18.0* 17.0* 19.0*   < > 18.0* 16.0*   BUN mg/dL 84* 82* 87*   < > 82* 78*   CREATININE mg/dL 3.39* 2.68* 2.52*   < > 2.37* 2.28*   MAGNESIUM mg/dL  --  2.8*  --   --  2.9* 2.9*   PHOSPHORUS mg/dL  --  4.5  --   --   --   --    GLUCOSE mg/dL 94 162* 170*   < > 142* 188*    < > = values in this interval not displayed.     Estimated Creatinine Clearance: 22.1 mL/min (A) (by C-G formula based on SCr of 3.39 mg/dL (H)).    Results from last 7 days "   Lab Units 04/26/24  0313   PH, ARTERIAL pH units 7.441   PCO2, ARTERIAL mm Hg 24.6*   PO2 ART mm Hg 118.0*       Images:   None new    I reviewed the patient's results and images.     Assessment & Plan   Impression        Cardiac arrest    Type 2 diabetes mellitus with hyperglycemia    Hyperlipidemia LDL goal <70    Hypothyroidism (acquired)    Coronary artery disease involving native coronary artery of native heart with angina pectoris    Pacemaker    HFrEF (heart failure with reduced ejection fraction)    Anemia, chronic disease    Stage 3b chronic kidney disease    UTI (urinary tract infection)    Acute respiratory failure with hypoxia    Elevated troponin level not due myocardial infarction       Plan        1.  Patient s/p cardiac arrest complicated by shock liver and acute on chronic renal failure.  Patient seems to be recovering.  Has severe cardiomyopathy with ejection fraction less than 20%.  Cardiology team is following.  Continue aspirin, statin.  Repeat echocardiogram today to evaluate cardiac function.  2.  Hemodynamically unstable still requiring norepinephrine.  Remains on high-dose midodrine as well.  Will attempt to wean pressors as tolerated.  Keep mean arterial pressure 70 to have better renal perfusion.  3.  Antibiotics discontinued per ID team with concern for possible renal injury from antibiotics.  Will check urine eosinophil smear.  4.  Oral intake improving.  Has not had a bowel movement for few days and will continue bowel regimen.  5.  Continue current insulin regimen for hyperglycemia management.  Will make further adjustments as needed  6.  Eliquis has been on hold due to ongoing renal and liver dysfunction.  Continue subcu heparin for DVT prophylaxis as patient will be high risk for DVT.  7.  GI prophylaxis.  8.  Out of bed and mobilize.  9.  Renal function is further worse.  Urine output is reduced.  Will continue close monitoring.  Will have nephrology team involved as well.   Concern is for possible cardiorenal etiology of his renal dysfunction.  Patient will not be a good candidate for renal replacement therapy due to advanced cardiomyopathy.  Discussed with cardiology as well and not a candidate for any coronary intervention or assistive devices either.  Overall prognosis is guarded.  Will continue to provide supportive care at this point.    Continue close monitoring in ICU as remains at risk of decline.  Discussed extensively with cardiology.  Discussed with nephrology team as well.    Plan of care and goals reviewed with multidisciplinary/antibiotic stewardship team during rounds.   I discussed the patient's findings and my recommendations with patient, family, and nursing staff     Time spent Critical care 35 min (exclusive of procedure time)  including high complexity decision making to assess, manipulate, and support vital organ system failure in this individual who has impairment of one or more vital organ systems such that there is a high probability of imminent or life threatening deterioration in the patient’s condition.      Antonio Angeles MD, [facility]  Pulmonary, Critical care and Sleep Medicine

## 2024-05-01 NOTE — NURSING NOTE
Reason for Wound, Ostomy and Continence (WOC) Nursing Consultation: follow up to buttocks wound    Patient awake and alert, seen with RN at bedside.  Family/support person not present.     Wounds noted by WOC:    Wound Assessment  Wound Type: MASD (Moisture associated skin damage) - friction and moisture damage  Location: bilateral gluteals  Measurements: open area 2x1cm on right gluteal  Wound Bed: blanchable, dry, and pink-other hyperpigmented areas, more brown than purple  Wound Edges: Irregular  Periwound Skin: dry some peeling epidermis, drough skin consistent with friction  Drainage Characteristics/Odor: none  Drainage Amount: none  Pain: No   Care provided: cleansed soap and water, barreir spray, lthin layer venelex, allevyn life foam placed slightly tilted to cover open area  Notes:  continue venelex application, needs covering due to high shear and PI risk  Wound Image:       Recommendation(s) for management of wound:   -Refer to wound care orders for specific instructions on how to treat/manage wound.  --Practice pressure injury prevention protocol.    Most recent Shahriar Scale score:  Sensory Perception: 3-->slightly limited  Moisture: 3-->occasionally moist  Activity: 2-->chairfast  Mobility: 2-->very limited  Nutrition: 2-->probably inadequate  Friction and Shear: 2-->potential problem  Shahriar Score: 14 (04/30/24 2000)     Specialty support surface: Isolibrium  - will need Spec bed when moves out of ICU     Pressure Injury Prevention Protocol (initiate for Shahriar Score of 18 or less):   *Keep skin dry, turn q 2 hr, keep heels elevated and offloaded with offloading heel boots.    *Apply z-guard to bottom and bony prominences daily and as needed with incontinence episodes.  *Follow C.A.R.E protocol if medical devices (Bipap, medel, Ng tube, etc) are being used.  *Reduce layers under patient (one sheet as drawsheet and two incontinence pads) to allow waffle or SYLVIE to improve microclimate   *Clean skin  gently with no-rinse PH-balanced cleanser and soft, disposable cloth (barrier wipes-blue pack).   *Raise knee-gatch before elevating HOB to reduce shearing      WOC Team will continue to follow.  Please re-consult if the wound(s) worsens.

## 2024-05-01 NOTE — PLAN OF CARE
Problem: Adult Inpatient Plan of Care  Goal: Plan of Care Review  Outcome: Ongoing, Not Progressing  Flowsheets (Taken 5/1/2024 1750)  Progress: improving  Plan of Care Reviewed With: patient  Outcome Evaluation: Patient rested well tonight. Patient continues to complain of occasional pain well controlled with P.O. pain medication. Patient remains A&Ox3 and patient has denied hallucinationa tonight. Patient remains on levophed to maintaine Map >65. Patient remans on room air and RN is continuing to monitor closely, continue with currant plan  of care.

## 2024-05-02 ENCOUNTER — ANCILLARY PROCEDURE (OUTPATIENT)
Dept: SPEECH THERAPY | Facility: HOSPITAL | Age: 79
End: 2024-05-02
Payer: MEDICARE

## 2024-05-02 LAB
ALBUMIN SERPL-MCNC: 2.8 G/DL (ref 3.5–5.2)
ALBUMIN/GLOB SERPL: 1.2 G/DL
ALP SERPL-CCNC: 69 U/L (ref 39–117)
ALT SERPL W P-5'-P-CCNC: 132 U/L (ref 1–41)
ANION GAP SERPL CALCULATED.3IONS-SCNC: 12 MMOL/L (ref 5–15)
AST SERPL-CCNC: 21 U/L (ref 1–40)
BASOPHILS # BLD AUTO: 0.05 10*3/MM3 (ref 0–0.2)
BASOPHILS NFR BLD AUTO: 0.5 % (ref 0–1.5)
BH CV ECHO MEAS - AO MAX PG: 5.1 MMHG
BH CV ECHO MEAS - AO MEAN PG: 2.5 MMHG
BH CV ECHO MEAS - AO ROOT DIAM: 3.7 CM
BH CV ECHO MEAS - AO V2 MAX: 112.5 CM/SEC
BH CV ECHO MEAS - AO V2 VTI: 24.2 CM
BH CV ECHO MEAS - AVA(I,D): 1.96 CM2
BH CV ECHO MEAS - EDV(CUBED): 175.6 ML
BH CV ECHO MEAS - EDV(MOD-SP2): 233 ML
BH CV ECHO MEAS - EDV(MOD-SP4): 237 ML
BH CV ECHO MEAS - EF(MOD-BP): 21.5 %
BH CV ECHO MEAS - EF(MOD-SP2): 20.2 %
BH CV ECHO MEAS - EF(MOD-SP4): 21.9 %
BH CV ECHO MEAS - ESV(CUBED): 117.6 ML
BH CV ECHO MEAS - ESV(MOD-SP2): 186 ML
BH CV ECHO MEAS - ESV(MOD-SP4): 185 ML
BH CV ECHO MEAS - FS: 12.5 %
BH CV ECHO MEAS - IVS/LVPW: 1 CM
BH CV ECHO MEAS - IVSD: 1 CM
BH CV ECHO MEAS - LA DIMENSION: 4.2 CM
BH CV ECHO MEAS - LAT PEAK E' VEL: 8.7 CM/SEC
BH CV ECHO MEAS - LV DIASTOLIC VOL/BSA (35-75): 109.2 CM2
BH CV ECHO MEAS - LV MASS(C)D: 219.7 GRAMS
BH CV ECHO MEAS - LV MAX PG: 2.16 MMHG
BH CV ECHO MEAS - LV MEAN PG: 1 MMHG
BH CV ECHO MEAS - LV SYSTOLIC VOL/BSA (12-30): 85.2 CM2
BH CV ECHO MEAS - LV V1 MAX: 73 CM/SEC
BH CV ECHO MEAS - LV V1 VTI: 15.1 CM
BH CV ECHO MEAS - LVIDD: 5.6 CM
BH CV ECHO MEAS - LVIDS: 4.9 CM
BH CV ECHO MEAS - LVOT AREA: 3.1 CM2
BH CV ECHO MEAS - LVOT DIAM: 2 CM
BH CV ECHO MEAS - LVPWD: 1 CM
BH CV ECHO MEAS - MED PEAK E' VEL: 5.4 CM/SEC
BH CV ECHO MEAS - MV A MAX VEL: 69 CM/SEC
BH CV ECHO MEAS - MV DEC SLOPE: 205 CM/SEC2
BH CV ECHO MEAS - MV DEC TIME: 0.25 SEC
BH CV ECHO MEAS - MV E MAX VEL: 46.4 CM/SEC
BH CV ECHO MEAS - MV E/A: 0.67
BH CV ECHO MEAS - MV MAX PG: 3.1 MMHG
BH CV ECHO MEAS - MV MEAN PG: 1 MMHG
BH CV ECHO MEAS - MV P1/2T: 100.9 MSEC
BH CV ECHO MEAS - MV V2 VTI: 23.8 CM
BH CV ECHO MEAS - MVA(P1/2T): 2.18 CM2
BH CV ECHO MEAS - MVA(VTI): 1.99 CM2
BH CV ECHO MEAS - PA ACC TIME: 0.11 SEC
BH CV ECHO MEAS - RAP SYSTOLE: 3 MMHG
BH CV ECHO MEAS - RVSP: 22 MMHG
BH CV ECHO MEAS - SV(LVOT): 47.4 ML
BH CV ECHO MEAS - SV(MOD-SP2): 47 ML
BH CV ECHO MEAS - SV(MOD-SP4): 52 ML
BH CV ECHO MEAS - SVI(LVOT): 21.9 ML/M2
BH CV ECHO MEAS - SVI(MOD-SP2): 21.6 ML/M2
BH CV ECHO MEAS - SVI(MOD-SP4): 24 ML/M2
BH CV ECHO MEAS - TAPSE (>1.6): 1.73 CM
BH CV ECHO MEAS - TR MAX PG: 18.5 MMHG
BH CV ECHO MEAS - TR MAX VEL: 214.5 CM/SEC
BH CV ECHO MEASUREMENTS AVERAGE E/E' RATIO: 6.58
BH CV VAS BP RIGHT ARM: NORMAL MMHG
BH CV XLRA - RV BASE: 4 CM
BH CV XLRA - RV LENGTH: 8.5 CM
BH CV XLRA - RV MID: 2.5 CM
BH CV XLRA - TDI S': 7.6 CM/SEC
BILIRUB SERPL-MCNC: 0.4 MG/DL (ref 0–1.2)
BUN SERPL-MCNC: 82 MG/DL (ref 8–23)
BUN/CREAT SERPL: 24.8 (ref 7–25)
CALCIUM SPEC-SCNC: 7.8 MG/DL (ref 8.6–10.5)
CHLORIDE SERPL-SCNC: 107 MMOL/L (ref 98–107)
CO2 SERPL-SCNC: 17 MMOL/L (ref 22–29)
CREAT SERPL-MCNC: 3.3 MG/DL (ref 0.76–1.27)
DEPRECATED RDW RBC AUTO: 45.9 FL (ref 37–54)
EGFRCR SERPLBLD CKD-EPI 2021: 18.3 ML/MIN/1.73
EOSINOPHIL # BLD AUTO: 0.49 10*3/MM3 (ref 0–0.4)
EOSINOPHIL NFR BLD AUTO: 4.6 % (ref 0.3–6.2)
ERYTHROCYTE [DISTWIDTH] IN BLOOD BY AUTOMATED COUNT: 14.7 % (ref 12.3–15.4)
GLOBULIN UR ELPH-MCNC: 2.3 GM/DL
GLUCOSE BLDC GLUCOMTR-MCNC: 133 MG/DL (ref 70–130)
GLUCOSE BLDC GLUCOMTR-MCNC: 151 MG/DL (ref 70–130)
GLUCOSE BLDC GLUCOMTR-MCNC: 157 MG/DL (ref 70–130)
GLUCOSE BLDC GLUCOMTR-MCNC: 188 MG/DL (ref 70–130)
GLUCOSE SERPL-MCNC: 165 MG/DL (ref 65–99)
HCT VFR BLD AUTO: 26.8 % (ref 37.5–51)
HGB BLD-MCNC: 9 G/DL (ref 13–17.7)
IMM GRANULOCYTES # BLD AUTO: 0.14 10*3/MM3 (ref 0–0.05)
IMM GRANULOCYTES NFR BLD AUTO: 1.3 % (ref 0–0.5)
LEFT ATRIUM VOLUME INDEX: 33 ML/M2
LYMPHOCYTES # BLD AUTO: 1.27 10*3/MM3 (ref 0.7–3.1)
LYMPHOCYTES NFR BLD AUTO: 11.9 % (ref 19.6–45.3)
MAGNESIUM SERPL-MCNC: 2.8 MG/DL (ref 1.6–2.4)
MCH RBC QN AUTO: 29.7 PG (ref 26.6–33)
MCHC RBC AUTO-ENTMCNC: 33.6 G/DL (ref 31.5–35.7)
MCV RBC AUTO: 88.4 FL (ref 79–97)
MONOCYTES # BLD AUTO: 1.12 10*3/MM3 (ref 0.1–0.9)
MONOCYTES NFR BLD AUTO: 10.5 % (ref 5–12)
NEUTROPHILS NFR BLD AUTO: 7.58 10*3/MM3 (ref 1.7–7)
NEUTROPHILS NFR BLD AUTO: 71.2 % (ref 42.7–76)
NRBC BLD AUTO-RTO: 0 /100 WBC (ref 0–0.2)
PHOSPHATE SERPL-MCNC: 4.9 MG/DL (ref 2.5–4.5)
PLATELET # BLD AUTO: 252 10*3/MM3 (ref 140–450)
PMV BLD AUTO: 10.1 FL (ref 6–12)
POTASSIUM SERPL-SCNC: 4.4 MMOL/L (ref 3.5–5.2)
PROT SERPL-MCNC: 5.1 G/DL (ref 6–8.5)
RBC # BLD AUTO: 3.03 10*6/MM3 (ref 4.14–5.8)
SODIUM SERPL-SCNC: 136 MMOL/L (ref 136–145)
WBC NRBC COR # BLD AUTO: 10.65 10*3/MM3 (ref 3.4–10.8)

## 2024-05-02 PROCEDURE — 63710000001 INSULIN GLARGINE PER 5 UNITS: Performed by: FAMILY MEDICINE

## 2024-05-02 PROCEDURE — 82948 REAGENT STRIP/BLOOD GLUCOSE: CPT

## 2024-05-02 PROCEDURE — 63710000001 INSULIN LISPRO (HUMAN) PER 5 UNITS: Performed by: INTERNAL MEDICINE

## 2024-05-02 PROCEDURE — 92612 ENDOSCOPY SWALLOW (FEES) VID: CPT

## 2024-05-02 PROCEDURE — 83735 ASSAY OF MAGNESIUM: CPT | Performed by: INTERNAL MEDICINE

## 2024-05-02 PROCEDURE — 25010000002 FUROSEMIDE PER 20 MG: Performed by: INTERNAL MEDICINE

## 2024-05-02 PROCEDURE — 25010000002 HEPARIN (PORCINE) PER 1000 UNITS: Performed by: INTERNAL MEDICINE

## 2024-05-02 PROCEDURE — 80053 COMPREHEN METABOLIC PANEL: CPT | Performed by: INTERNAL MEDICINE

## 2024-05-02 PROCEDURE — 84100 ASSAY OF PHOSPHORUS: CPT | Performed by: INTERNAL MEDICINE

## 2024-05-02 PROCEDURE — 85025 COMPLETE CBC W/AUTO DIFF WBC: CPT | Performed by: INTERNAL MEDICINE

## 2024-05-02 PROCEDURE — 99291 CRITICAL CARE FIRST HOUR: CPT | Performed by: INTERNAL MEDICINE

## 2024-05-02 RX ORDER — FUROSEMIDE 10 MG/ML
80 INJECTION INTRAMUSCULAR; INTRAVENOUS ONCE
Status: COMPLETED | OUTPATIENT
Start: 2024-05-02 | End: 2024-05-02

## 2024-05-02 RX ADMIN — SENNOSIDES AND DOCUSATE SODIUM 2 TABLET: 8.6; 5 TABLET ORAL at 21:07

## 2024-05-02 RX ADMIN — LEVOTHYROXINE SODIUM 88 MCG: 88 TABLET ORAL at 06:14

## 2024-05-02 RX ADMIN — SENNOSIDES AND DOCUSATE SODIUM 2 TABLET: 8.6; 5 TABLET ORAL at 10:36

## 2024-05-02 RX ADMIN — FUROSEMIDE 80 MG: 10 INJECTION, SOLUTION INTRAMUSCULAR; INTRAVENOUS at 11:06

## 2024-05-02 RX ADMIN — ASPIRIN 81 MG CHEWABLE TABLET 81 MG: 81 TABLET CHEWABLE at 10:36

## 2024-05-02 RX ADMIN — APIXABAN 5 MG: 5 TABLET, FILM COATED ORAL at 10:36

## 2024-05-02 RX ADMIN — NALOXEGOL OXALATE 25 MG: 25 TABLET, FILM COATED ORAL at 10:36

## 2024-05-02 RX ADMIN — PANTOPRAZOLE SODIUM 40 MG: 40 TABLET, DELAYED RELEASE ORAL at 06:14

## 2024-05-02 RX ADMIN — INSULIN GLARGINE 8 UNITS: 100 INJECTION, SOLUTION SUBCUTANEOUS at 21:09

## 2024-05-02 RX ADMIN — NOREPINEPHRINE BITARTRATE 0.04 MCG/KG/MIN: 0.03 INJECTION, SOLUTION INTRAVENOUS at 11:16

## 2024-05-02 RX ADMIN — HYDROCODONE BITARTRATE AND ACETAMINOPHEN 1 TABLET: 5; 325 TABLET ORAL at 14:41

## 2024-05-02 RX ADMIN — INSULIN LISPRO 2 UNITS: 100 INJECTION, SOLUTION INTRAVENOUS; SUBCUTANEOUS at 21:09

## 2024-05-02 RX ADMIN — POLYETHYLENE GLYCOL 3350 17 G: 17 POWDER, FOR SOLUTION ORAL at 10:37

## 2024-05-02 RX ADMIN — MIDODRINE HYDROCHLORIDE 10 MG: 10 TABLET ORAL at 21:08

## 2024-05-02 RX ADMIN — MIDODRINE HYDROCHLORIDE 10 MG: 10 TABLET ORAL at 06:14

## 2024-05-02 RX ADMIN — MIDODRINE HYDROCHLORIDE 10 MG: 10 TABLET ORAL at 14:41

## 2024-05-02 RX ADMIN — Medication 10 ML: at 21:09

## 2024-05-02 RX ADMIN — Medication 1 CAPSULE: at 10:36

## 2024-05-02 RX ADMIN — APIXABAN 2.5 MG: 2.5 TABLET, FILM COATED ORAL at 21:08

## 2024-05-02 RX ADMIN — ATORVASTATIN CALCIUM 20 MG: 20 TABLET, FILM COATED ORAL at 21:08

## 2024-05-02 RX ADMIN — HEPARIN SODIUM 5000 UNITS: 5000 INJECTION INTRAVENOUS; SUBCUTANEOUS at 06:14

## 2024-05-02 RX ADMIN — TAMSULOSIN HYDROCHLORIDE 0.4 MG: 0.4 CAPSULE ORAL at 10:36

## 2024-05-02 NOTE — PROGRESS NOTES
" [facility] - Progress Note    Jermaine Singh  1945  [id number]    05/02/24, 08:46 EDT      Chief Complaint: Following for cardiac arrest    Subjective:   Awake in bed, alert & oriented.  Daughter at bedside.  Still on low dose pressor support to maintain MAP >70.    Review of Systems:  Pertinent positives are listed above and in physical exam.  All others have been reviewed and are negative.    [Held by provider] apixaban, 5 mg, Oral, Q12H  aspirin, 81 mg, Oral, Daily  atorvastatin, 20 mg, Oral, Nightly  castor oil-balsam peru, 1 Application, Topical, Daily  heparin (porcine), 5,000 Units, Subcutaneous, Q8H  insulin glargine, 8 Units, Subcutaneous, Nightly  Insulin Lispro, 2-9 Units, Subcutaneous, 4x Daily AC & at Bedtime  lactobacillus acidophilus, 1 capsule, Oral, Daily  levothyroxine, 88 mcg, Oral, Q AM  midodrine, 10 mg, Oral, TID AC  pantoprazole, 40 mg, Oral, Q AM  polyethylene glycol, 17 g, Oral, Daily  senna-docusate sodium, 2 tablet, Oral, BID  sodium chloride, 10 mL, Intravenous, Q12H  tamsulosin, 0.4 mg, Oral, Daily        Objective:  Vitals:   height is 190 cm (74.8\") and weight is 92.7 kg (204 lb 5.9 oz). His bladder temperature is 97.9 °F (36.6 °C). His blood pressure is 101/60 and his pulse is 66. His respiration is 16 and oxygen saturation is 95%.     Intake/Output Summary (Last 24 hours) at 5/2/2024 0818  Last data filed at 5/2/2024 0618  Gross per 24 hour   Intake 829.3 ml   Output 465 ml   Net 364.3 ml       Physical Exam:  General:  WN, Appears stated age  CV:  Normal S1,S2. No murmur, rub, or gallop. + Pacemaker  Resp:  CTA Soy, equal, nonlabored.  Abd:  Soft, + BS, no organomegaly. Nontender to palpation.  Extrem:  RLE with wrap, trace LLE.           Results from last 7 days   Lab Units 05/02/24  0433   WBC 10*3/mm3 10.65   HEMOGLOBIN g/dL 9.0*   HEMATOCRIT % 26.8*   PLATELETS 10*3/mm3 252     Results from last 7 days   Lab Units 05/02/24  0433   SODIUM mmol/L 136   POTASSIUM " "mmol/L 4.4   CHLORIDE mmol/L 107   CO2 mmol/L 17.0*   BUN mg/dL 82*   CREATININE mg/dL 3.30*   CALCIUM mg/dL 7.8*   BILIRUBIN mg/dL 0.4   ALK PHOS U/L 69   ALT (SGPT) U/L 132*   AST (SGOT) U/L 21   GLUCOSE mg/dL 165*                         Tele: V paced      Assessment/Plan:  Cardiac arrest/VF arrest  Substantial substrate for VF arrest including ischemic heart disease with severely diffusely diseased LAD as well as newly identified severe LV dysfunction with EF <20%  Films reviewed by cards 4/25; \"Based on prebypass angiogram, I do not suspect readily \"intervenable\" lesions to correct ischemic substrate.  LAD is severely diffusely diseased and would not be suitable for percutaneous treatment.  Frankly, I am impressed that Dr. Au was able to bypass the LAD in 2019.\"  Recommend against emergent cardiac catheterization given his hemodynamic instability and unlikelihood of \"targets\" to correct ischemic substrate  Continue aspirin and statin  No beta-blocker due to hypotension, resume as pressor support weaned/BP tolerates  Discontinue IV amiodarone.  If recurrence of VT/VF, start lidocaine  Repeat echo reviewed     Severe LV dysfunction with EF <20%  Newly diagnosed.  Last LVEF 48% in 2022  Unclear whether this is related to stunning from VF arrest or whether this has been pre-existing his admission.  I suspect the latter as patient has had dwindling functional capacity  Presently with some element of cardiogenic shock which prohibits GDMT  SJ pacemaker interrogation reviewed  Repeat echo reviewed     Multifactorial shock  Cardiogenic, possibly septic  Continue norepinephrine his primary pressor, epinephrine is secondary pressor  Wean vasopressors to maintain MAP >70 mmHg     Elevated troponin, POA  Initial troponin measurements on admission elevated but flat and not consistent with myocardial infarction  If troponin is repeated, I suspect will be elevated but difficult to determine whether related to a type I " MI or type II MI due to fixed CAD, CHF, and arrhythmia     Type 2 diabetes mellitus not on insulin with hyperglycemia  Uncontrolled (untreated?) diabetes prior to admission  Has only not candidate for SGL 2 inhibitor due to renal insufficiency  Start Glucomander protocol     Hyperlipidemia with goal LDL <70  LDL well-controlled  Continue to hold statin although LFTs are improving.     Stage IIIb CKD  Hold losartan due to hypotension and anticipated [place] from cardiac event  Rise in Cr, 3.3 today.     History of DVT  Continue to hold apixaban due to worsening renal insufficiency      Paroxysmal Atrial Fibrillation  [facility] [place] device interrogated Friday, reviewed.  Report on  chart.  Holding NOAC      I discussed the patient's findings and my recommendations with the patient, any present family members, and the nursing staff.  Cooper Apodaca MD saw and examined patient, verified hx and PE, read all radiographic studies, reviewed labs and micro data, and formulated dx, plan for treatment and all medical decision making.      Megan Shukla PA-C  05/02/24, 08:20 EDT

## 2024-05-02 NOTE — PLAN OF CARE
Goal Outcome Evaluation:                     Anticipated Discharge Disposition (SLP): inpatient rehabilitation facility          SLP Swallowing Diagnosis: moderate, pharyngeal dysphagia (05/02/24 8972)

## 2024-05-02 NOTE — PROGRESS NOTES
INFECTIOUS DISEASE Progress Note    Jermaine Singh  1945  [id number]    Admission Date: 4/22/2024      Requesting Provider: No ref. provider found  Evaluating Physician: Lincoln Padilla MD    Reason for Consultation:  UTI    History of present illness:    4/23/24: Patient is a 79 y.o. male  with a history of type 2 diabetes mellitus, peripheral neuropathy, stage III chronic kidney disease, bilateral DVTs, paroxysmal atrial fibrillation, sick sinus syndrome status post pacemaker placement, CHF, peripheral vascular disease, and left diabetic foot infection who is seen today for evaluation of UTI.  He had a history of UTI last month with urine cultures and early March growing yeast.  He was apparently treated with an antifungal agent.    Over the last few days he noted the onset of profound weakness and fatigue.  He also noticed some dysuria.  He was brought to the emergency room and found to have pyuria and bacteriuria with leukocytosis.   Urine culture was sent and he was started on ceftriaxone.   His white blood cell count today is 11.8.   He was noted to have a blister over his right fifth metatarsal head and a chronic right pretibial wound.    4/24/24: He remains afebrile.  His urine culture was finalized as negative.  I have asked the laboratory to hold the urine culture for yeast but they did not and it was disposed of last night.   He states that his dysuria is improved today.  Feels better.     4/25/2024:  He remains afebrile.  White blood cell count today is 6.7.  Creatinine is 1.99.   Urinalysis yesterday revealed too numerous to count white blood cells.  He denied dysuria this morning.  After I saw him he suffered a V-fib cardiac arrest and was transferred to the ICU.  He is endotracheally intubated    4/26/24:   His maximum temperature over the last 24 hours is 99.5.  Repeat urine culture from 4/24 is pending.  He remains on ventilatory support.    Creatinine is 2.28.  ALT is  1028.  White  blood cell count is 9.95.  O2 saturation is 100% on an FiO2 of 30%.  Chest x-ray reveals no pulmonary infiltrates. \    Later this morning he was extubated.  He is confused.  He knows his name and his birthdate.  He cannot answer more complex questions.    4/27/2024:  His maximum temperature over the last 24 hours is 99.5.  His left ventricular ejection fraction is less than 20%.  His white blood cell count is 2.37.  ALT is down to 626.   Chest x-ray 4/27 reveals moderate hemoglobin airspace disease.   O2 saturation is 99% on 4 L.   He is on Zosyn therapy.   Later today his oxygen demand increased and he was placed   On BiPAP.     4/28/2024:   He has remained afebrile.   White blood cell count is 10.1. His creatinine is 2.65 ALT is 450.  His oxygen demand is down to 1 liter today. Chest x-ray today reveals interval improvement in the right upper lobe infiltrate.  He is coughing and has some sputum production.  He remains confused and did not recognize his wife and daughter today      4/29/2024: He remains afebrile.  His creatinine is 2.52.  ALT is 320.  White blood cell count is 9.3.    He is now on 1 L with an O2 saturation of 96%.  He is more alert and responsive today.  He is less confused.     4/30/2024: He has remained afebrile.  Creatinine is 2.68.  ALT is 218.   White blood cell count is 9.1.  room air. room air.  His confusion is improved today.  He recognized his wife today.  He denies increased cough and sputum production.  He knows me by name today.    5/1/2024:  He remains afebrile.  His creatinine today is 3.39.   White blood cell count is 8.9. O2 saturation is 97% on room air.      5/2/2024:  He remains afebrile.  His creatinine today is 3.3.  White blood cell count is 10.7.   Urine eosinophil smear was 0. He has continued to require vasopressor therapy.   Echocardiogram from yesterday reveals a 21-25% ejection fraction.   He feels better today.  His daughter indicates that  is cognitive function has  improved and he is no longer confused.    Past Medical History:   Diagnosis Date    Allergic     Arthritis     Asthma     Coronary artery disease     Diabetes mellitus 2000    started on inuslin 12/2018; started on po meds in 2000; checking blood sugars daily     Diabetic foot infection 07/23/2023    Disease of thyroid gland     po meds daily for hypothyroidism     Elevated serum creatinine 11/15/2022    Elevated troponin 10/02/2022    Generalized weakness 11/15/2022    History of fracture as a child     rt leg- severe     Hyperlipidemia     Hypertension     Hypothyroidism     PAF (paroxysmal atrial fibrillation) 07/23/2023    Peripheral neuropathy     Peripheral vascular disease     s/p angiogram 2/19-needs stent in left leg     Pleural effusion, bilateral 11/15/2022    Proximal phalanx fracture of the second digit extending into the second metatarsal joint 07/28/2022    RBBB     Right knee pain     Status post amputation of great toe and second toe of left foot 07/23/2023    Status post amputation of great toe, left 11/15/2022    Unstable angina 02/12/2019    Added automatically from request for surgery [id number]    Vitamin D deficiency        Past Surgical History:   Procedure Laterality Date    AMPUTATION DIGIT Left 01/17/2023    Procedure: AMPUTATION DIGIT LEFT;  Surgeon: Gopal Márquez MD;  Location:  [facility];  Service: Vascular;  Laterality: Left;    APPENDECTOMY      CARDIAC CATHETERIZATION N/A 02/14/2019    Procedure: Left Heart Cath;  Surgeon: Cooper Apodaca MD;  Location: [facility];  Service: Cardiology    CARDIAC ELECTROPHYSIOLOGY PROCEDURE N/A 05/18/2022    Procedure: DEVICE IMPLANT;  Surgeon: Cooper Apodaca MD;  Location:  [facility] INVASIVE LOCATION;  Service: Cardiology;  Laterality: N/A;    CARDIAC SURGERY      COLONOSCOPY      CORONARY ARTERY BYPASS GRAFT N/A 03/22/2019    Procedure: MEDIAN STERNOTOMY, CORONARY ARTERY BYPASS GRAFT X3, UTILIZING THE LEFT  INTERNAL MAMMARY ARTERY, EVH AND OPEN HARVEST OF THE RIGHT GREATER SAPHENOUS VEIN, EXPLORATION OF THE LEFT LEG;  Surgeon: Ap Au MD;  Location:  [facility];  Service: Cardiothoracic    EYE SURGERY Bilateral     cataracts     FRACTURE SURGERY      INTERVENTIONAL RADIOLOGY PROCEDURE N/A 07/29/2021    Procedure: Abdominal Aortagram with Runoff;  Surgeon: Jermaine Hernandez MD;  Location:  [facility] IN[facility]ASIVE [facility]ATION;  Service: Cardiovascular;  Laterality: N/A;    KNEE ARTHROSCOPY      right x 2, left x 1    LACERATION REPAIR      right leg    LEG SURGERY      2 for fracture of rt leg     TONSILLECTOMY      Adnoidectomy    TRANS METATARSAL AMPUTATION Left 08/02/2022    Procedure: GREAT TOE AMPUTATION LEFT;  Surgeon: Gopal Márquez MD;  Location:  [facility];  Service: Vascular;  Laterality: Left;       Family History   Problem Relation Age of Onset    Coronary artery disease Mother     Diabetes Mother     Hyperlipidemia Mother     Cancer Father        Social History     Socioeconomic History    Marital status:     Number of children: 2   Tobacco Use    Smoking status: Never     Passive exposure: Past    Smokeless tobacco: Never   Vaping Use    Vaping status: Never Used   Substance and Sexual Activity    Alcohol use: Not Currently     Comment: every 2-3 months    Drug use: No    Sexual activity: Not Currently     Partners: Female       Allergies   Allergen Reactions    Vancomycin Other (See Comments)     Kidney failure per last admission         Medication:    Current Facility-Administered Medications:     acetaminophen (TYLENOL) tablet 650 mg, 650 mg, Oral, Q6H PRN, Maricel Peraza, APRN, 650 mg at 04/25/24 0940    albuterol (PROVENTIL) nebulizer solution 0.083% 2.5 mg/3mL, 2.5 mg, Nebulization, BID PRN, Nathalia Mckeon DNP, APRN, 2.5 mg at 04/30/24 0755    [Held by provider] apixaban (ELIQUIS) tablet 5 mg, 5 mg, Oral, Q12H, Maricel Peraza, APRN, 5 mg at 04/25/24 0840    aspirin chewable  tablet 81 mg, 81 mg, Oral, Daily, Maricel Peraza APRN, 81 mg at 05/01/24 0957    atorvastatin (LIPITOR) tablet 20 mg, 20 mg, Oral, Nightly, Golden Madison MD, 20 mg at 05/01/24 2015    sennosides-docusate (PERICOLACE) 8.6-50 MG per tablet 2 tablet, 2 tablet, Oral, BID, 2 tablet at 05/01/24 2015 **AND** polyethylene glycol (MIRALAX) packet 17 g, 17 g, Oral, Daily PRN, 17 g at 04/30/24 2038 **AND** bisacodyl (DULCOLAX) EC tablet 5 mg, 5 mg, Oral, Daily PRN, 5 mg at 05/01/24 0956 **AND** bisacodyl (DULCOLAX) suppository 10 mg, 10 mg, Rectal, Daily PRN, Antonio Angeles MD    castor oil-balsam peru (VENELEX) ointment 1 Application, 1 Application, Topical, Daily, Eyad Ledesma MD    dextrose (D50W) (25 g/50 mL) IV injection 25 g, 25 g, Intravenous, Q15 Min PRN, Maricel Peraza APRN    glucagon (GLUCAGEN) injection 1 mg, 1 mg, Intramuscular, Q15 Min PRN, Maricel Peraza APRN    heparin (porcine) 5000 UNIT/ML injection 5,000 Units, 5,000 Units, Subcutaneous, Q8H, Antonio Angeles MD, 5,000 Units at 05/02/24 0614    HYDROcodone-acetaminophen (NORCO) 5-325 MG per tablet 1 tablet, 1 tablet, Oral, Q4H PRN, Hailee Shukla, DO, 1 tablet at 05/01/24 1019    insulin glargine (LANTUS, SEMGLEE) injection 8 Units, 8 Units, Subcutaneous, Nightly, Luz Elena Batista, DO, 8 Units at 05/01/24 2016    Insulin Lispro (humaLOG) injection 2-9 Units, 2-9 Units, Subcutaneous, 4x Daily AC & at Bedtime, Antonio Angeles MD, 4 Units at 05/01/24 2016    lactobacillus acidophilus (RISAQUAD) capsule 1 capsule, 1 capsule, Oral, Daily, Maricel Peraza APRN, 1 capsule at 05/01/24 0956    levothyroxine (SYNTHROID, LEVOTHROID) tablet 88 mcg, 88 mcg, Oral, Q AM, Maricel Peraza APRN, 88 mcg at 05/02/24 0614    Magnesium Low Dose Replacement - Follow Nurse / BPA Driven Protocol, , Does not apply, PRN, Maricel Peraza APRN    midodrine (PROAMATINE) tablet 10 mg, 10 mg, Oral, TID AC, Golden Madison MD, 10  mg at 24 0614    nitroglycerin (NITROSTAT) SL tablet 0.4 mg, 0.4 mg, Sublingual, Q5 Min PRN, Maricel Peraza APRN    norepinephrine (LEVOPHED) 8 mg in 250 mL NS infusion (premix), 0.02-0.3 mcg/kg/min, Intravenous, Titrated, Eyad Ledesma MD, Last Rate: 5.83 mL/hr at 24 1539, 0.04 mcg/kg/min at 24 1539    pantoprazole (PROTONIX) EC tablet 40 mg, 40 mg, Oral, Q AM, Antonio Angeles MD, 40 mg at 24 0614    polyethylene glycol (MIRALAX) packet 17 g, 17 g, Oral, Daily, Artur Avila MD, 17 g at 24 1303    prochlorperazine (COMPAZINE) injection 5 mg, 5 mg, Intravenous, Q6H PRN, Maricel Peraza APRN, 2.5 mg at 24 1316    sodium chloride 0.9 % flush 10 mL, 10 mL, Intravenous, PRN, Case, Hailee V., DO    sodium chloride 0.9 % flush 10 mL, 10 mL, Intravenous, Q12H, Case, Hailee V., DO, 10 mL at 24    tamsulosin (FLOMAX) 24 hr capsule 0.4 mg, 0.4 mg, Oral, Daily, Maricel Peraza APRN, 0.4 mg at 24 0956    Antibiotics:  Anti-Infectives (From admission, onward)      Ordered     Dose/Rate Route Frequency Start Stop    24 1356  piperacillin-tazobactam (ZOSYN) 3.375 g IVPB in 100 mL NS MBP (CD)        Ordering Provider: Eyad Ledesma MD    3.375 g  over 30 Minutes Intravenous Once 24 1445 24 1557              Review of Systems:  See HPI      Physical Exam:   Vital Signs  Temp (24hrs), Av.8 °F (36.6 °C), Min:97.5 °F (36.4 °C), Max:98.2 °F (36.8 °C)    Temp  Min: 97.5 °F (36.4 °C)  Max: 98.2 °F (36.8 °C)  BP  Min: 76/56  Max: 129/108  Pulse  Min: 59  Max: 96  Resp  Min: 14  Max: 20  SpO2  Min: 93 %  Max: 100 %    GENERAL:   Alert and responsive.  HEENT: Normocephalic, atraumatic.  PERRL. EOMI. No conjunctival injection. No icterus.  No labial ulcers  NECK: Supple   HEART:  irregular rate   LUNGS:     Clear anteriorly  ABDOMEN: Soft, nontender, nondistended. No rebound or guarding. NO mass or HSM.  EXT: His right distal  "leg is in an  Unna boot.   Right arm arterial line has no erythema  Right arm PICC has no erythema  :   Fonseca catheter in place.  MSK: No joint effusions or erythema  SKIN: Warm and dry without cutaneous eruptions on Inspection/palpation.    NEURO:    He is alert and responsive.   This conversation is appropriate.  He moves all of his extremities.    Laboratory Data    Results from last 7 days   Lab Units 05/02/24  0433 05/01/24  0339 04/30/24  0447   WBC 10*3/mm3 10.65 8.92 9.12   HEMOGLOBIN g/dL 9.0* 9.0* 8.8*   HEMATOCRIT % 26.8* 27.0* 26.7*   PLATELETS 10*3/mm3 252 263 232     Results from last 7 days   Lab Units 05/02/24  0433   SODIUM mmol/L 136   POTASSIUM mmol/L 4.4   CHLORIDE mmol/L 107   CO2 mmol/L 17.0*   BUN mg/dL 82*   CREATININE mg/dL 3.30*   GLUCOSE mg/dL 165*   CALCIUM mg/dL 7.8*     Results from last 7 days   Lab Units 05/02/24  0433   ALK PHOS U/L 69   BILIRUBIN mg/dL 0.4   ALT (SGPT) U/L 132*   AST (SGOT) U/L 21                         Estimated Creatinine Clearance: 23.8 mL/min (A) (by C-G formula based on SCr of 3.3 mg/dL (H)).      Microbiology:  No results found for: \"ACANTHNAEG\", \"AFBCX\", \"BPERTUSSISCX\", \"BLOODCX\"  No results found for: \"BCIDPCR\", \"CXREFLEX\", \"CSFCX\", \"CULTURETIS\"  No results found for: \"CULTURES\", \"HSVCX\", \"URCX\"  No results found for: \"EYECULTURE\", \"GCCX\", \"HSVCULTURE\", \"LABHSV\"  No results found for: \"LEGIONELLA\", \"MRSACX\", \"MUMPSCX\", \"MYCOPLASCX\"  No results found for: \"NOCARDIACX\", \"STOOLCX\"  Urine Culture   Date Value Ref Range Status   04/22/2024 No growth  Preliminary     No results found for: \"VIRALCULTU\", \"WOUNDCX\"        Radiology:  Imaging Results (Last 72 Hours)       ** No results found for the last 72 hours. **          I read his chest x-ray of 4/28      Impression:    1.    Right upper lobe aspiration pneumonia-he is S/P Zosyn therapy.  He is clinically and radiographically improved.  I discontinued Zosyn on 5/1   2.  V-fib arrest-status post resuscitation. "  He is on amiodarone   3.   Acute kidney injury-superimposed on stage IIIb chronic kidney disease.   4.  Chronic right pretibial dermatitis-without evidence of cellulitis  5.  Type 2 diabetes mellitus  6.  coronary artery disease/status post CABG, 3/22/2019  7.  Severe systolic heart failure- with some improvement on his echocardiogram 5/1.  8.  Stage IIIb chronic kidney disease  9.  Paroxysmal atrial fibrillation  10.  Acute hypoxic respiratory failure- improved   11.  Ischemic hepatitis-  Improved  12.  Pyuria- with a negative urine culture.  13.  Anoxic encephalopathy- improved  14.  Right fifth metatarsal wound/bullous lesion    PLAN/RECOMMENDATIONS:   1.  Continue with a right leg Unna boot  2.  Continue off of antibiotic therapy      Lincoln Padilla MD  5/2/2024  07:50 EDT

## 2024-05-02 NOTE — PLAN OF CARE
Goal Outcome Evaluation:  Pt remains on low dose of levo to keep MAP >70. Room air.  mL. Afebrile. FEES planned for today.

## 2024-05-02 NOTE — MBS/VFSS/FEES
Acute Care - Speech Language Pathology   Swallow Initial Evaluation [facility]     Patient Name: Jermaine Singh  : 1945  MRN: [id number]  Today's Date: 2024               Admit Date: 2024    Visit Dx:     ICD-10-CM ICD-9-CM   1. Acute UTI  N39.0 599.0   2. Generalized weakness  R53.1 780.79   3. Edema of right lower extremity  R60.0 782.3   4. Leg erythema  L53.9 695.9   5. Chronic kidney disease, unspecified CKD stage  N18.9 585.9   6. Elevated troponin  R79.89 790.6   7. Hyperkalemia  E87.5 276.7   8. Cardiac arrest  I46.9 427.5   9. Oropharyngeal dysphagia  R13.12 787.22     Patient Active Problem List   Diagnosis    Type 2 diabetes mellitus with hyperglycemia    Hyperlipidemia LDL goal <70    Hypothyroidism (acquired)    Vitamin D deficiency on Rx     Diabetic neuropathy    Coronary artery disease involving native coronary artery of native heart with angina pectoris    Atherosclerosis of native artery of both lower extremities with intermittent claudication    Pacemaker    HFrEF (heart failure with reduced ejection fraction)    DVT, bilateral lower limbs    Cellulitis of right lower extremity    Anemia, chronic disease    PVD (peripheral vascular disease)    Diabetic foot ulcer    GERD without esophagitis    Stage 3b chronic kidney disease    Right inguinal hernia    PAF (paroxysmal atrial fibrillation)    Moderate malnutrition    UTI (urinary tract infection)    Cardiac arrest    Acute respiratory failure with hypoxia    History of DVT (deep vein thrombosis)    Elevated troponin level not due myocardial infarction     Past Medical History:   Diagnosis Date    Allergic     Arthritis     Asthma     Coronary artery disease     Diabetes mellitus 2000    started on inuslin 2018; started on po meds in ; checking blood sugars daily     Diabetic foot infection 2023    Disease of thyroid gland     po meds daily for hypothyroidism     Elevated serum creatinine 11/15/2022    Elevated  troponin 10/02/2022    Generalized weakness 11/15/2022    History of fracture as a child     rt leg- severe     Hyperlipidemia     Hypertension     Hypothyroidism     PAF (paroxysmal atrial fibrillation) 07/23/2023    Peripheral neuropathy     Peripheral vascular disease     s/p angiogram 2/19-needs stent in left leg     Pleural effusion, bilateral 11/15/2022    Proximal phalanx fracture of the second digit extending into the second metatarsal joint 07/28/2022    RBBB     Right knee pain     Status post amputation of great toe and second toe of left foot 07/23/2023    Status post amputation of great toe, left 11/15/2022    Unstable angina 02/12/2019    Added automatically from request for surgery [id number]    Vitamin D deficiency      Past Surgical History:   Procedure Laterality Date    AMPUTATION DIGIT Left 01/17/2023    Procedure: AMPUTATION DIGIT LEFT;  Surgeon: Gopal Márquez MD;  Location:  [facility];  Service: Vascular;  Laterality: Left;    APPENDECTOMY      CARDIAC CATHETERIZATION N/A 02/14/2019    Procedure: Left Heart Cath;  Surgeon: Cooper Apodaca MD;  Location:  [facility] CATH INVASIVE LOCATION;  Service: Cardiology    CARDIAC ELECTROPHYSIOLOGY PROCEDURE N/A 05/18/2022    Procedure: DEVICE IMPLANT;  Surgeon: Cooper Apodaca MD;  Location:  [facility] CATH INVASIVE LOCATION;  Service: Cardiology;  Laterality: N/A;    CARDIAC SURGERY      COLONOSCOPY      CORONARY ARTERY BYPASS GRAFT N/A 03/22/2019    Procedure: MEDIAN STERNOTOMY, CORONARY ARTERY BYPASS GRAFT X3, UTILIZING THE LEFT INTERNAL MAMMARY ARTERY, EVH AND OPEN HARVEST OF THE RIGHT GREATER SAPHENOUS VEIN, EXPLORATION OF THE LEFT LEG;  Surgeon: Ap Au MD;  Location:  [facility];  Service: Cardiothoracic    EYE SURGERY Bilateral     cataracts     FRACTURE SURGERY      INTERVENTIONAL RADIOLOGY PROCEDURE N/A 07/29/2021    Procedure: Abdominal Aortagram with Runoff;  Surgeon: Jermaine Hernandez MD;  Location:  [facility] CATH INVASIVE LOCATION;   Service: Cardiovascular;  Laterality: N/A;    KNEE ARTHROSCOPY      right x 2, left x 1    LACERATION REPAIR      right leg    LEG SURGERY      2 for fracture of rt leg     TONSILLECTOMY      Adnoidectomy    TRANS METATARSAL AMPUTATION Left 08/02/2022    Procedure: GREAT TOE AMPUTATION LEFT;  Surgeon: Gopal Márquez MD;  Location: [facility];  Service: Vascular;  Laterality: Left;       SLP Recommendation and Plan  SLP Swallowing Diagnosis: moderate, pharyngeal dysphagia (05/02/24 0935)  SLP Diet Recommendation: regular textures, no mixed consistencies, nectar thick liquids (05/02/24 0935)  Recommended Precautions and Strategies: upright posture during/after eating, small bites of food and sips of liquid, no straw, multiple swallows per bite of food, multiple swallows per sip of liquid, general aspiration precautions, assist with feeding (05/02/24 0935)  SLP Rec. for Method of Medication Administration: meds whole, meds crushed, with puree, as tolerated (05/02/24 0935)     Monitor for Signs of Aspiration: yes, notify SLP if any concerns (05/02/24 0935)     Swallow Criteria for Skilled Therapeutic Interventions Met: demonstrates skilled criteria (05/02/24 0935)  Anticipated Discharge Disposition (SLP): inpatient rehabilitation facility (05/02/24 0935)  Rehab Potential/Prognosis, Swallowing: good, to achieve stated therapy goals (05/02/24 0935)  Therapy Frequency (Swallow): 5 days per week (05/02/24 0935)  Predicted Duration Therapy Intervention (Days): until discharge (05/02/24 0935)  Oral Care Recommendations: Oral Care BID/PRN, Suction toothbrush (05/02/24 0935)                                               SWALLOW EVALUATION (Last 72 Hours)       SLP Adult Swallow Evaluation       Row Name 05/02/24 0935 05/01/24 0830                Rehab Evaluation    Document Type evaluation  -MH therapy note (daily note)  -RS       Subjective Information no complaints  -MH no complaints  -RS       Patient Observations  alert;cooperative  - alert;cooperative;agree to therapy  -RS       Patient/Family/Caregiver Comments/Observations No family present  - no family present  -RS       Patient Effort good  - good  -RS       Symptoms Noted During/After Treatment none  - none  -RS       Oral Care -- other (see comments)  pt seen with breakfast tray  -RS          General Information    Patient Profile Reviewed yes  - --       Pertinent History Of Current Problem See previous eval, pt referred for FEES  - --       Current Method of Nutrition regular textures;nectar/syrup-thick liquids;no mixed consistencies  - --       Precautions/Limitations, Vision WFL;for purposes of eval  - --       Precautions/Limitations, Hearing WFL;for purposes of eval  - --       Prior Level of Function-Communication unknown  - --       Prior Level of Function-Swallowing unknown  - --       Plans/Goals Discussed with patient;agreed upon  - --       Barriers to Rehab medically complex  - --       Patient's Goals for Discharge patient did not state  - --          Pain    Additional Documentation Pain Scale: FACES Pre/Post-Treatment (Group)  - Pain Scale: FACES Pre/Post-Treatment (Group)  -RS          Pain Scale: FACES Pre/Post-Treatment    Pain: FACES Scale, Pretreatment 0-->no hurt  - 6-->hurts even more  -RS       Posttreatment Pain Rating 0-->no hurt  - 6-->hurts even more  -RS       Pain Location - Side/Orientation -- Bilateral  -RS       Pain Location -- generalized  -RS       Pain Location - -- chest  -RS          Fiberoptic Endoscopic Evaluation of Swallowing (FEES)    Risks/Benefits Reviewed risks/benefits explained;patient;agreed to eval  - --       Nasal Entry right:  - --       Scope serial number/identification [id number]  - --          Anatomy and Physiology    Anatomic Considerations no anatomic structural deviation  - --       Velopharyngeal WFL  - --       Base of Tongue symmetrical;range adequate  - --        Epiglottis rests posteriorly  - --       Laryngeal Function Breathing symmetrical  - --       Laryngeal Function Phonation symmetrical  - --       Laryngeal Function to Breath Hold TVF/FVF/Arytenoid;sustained closure  - --       Secretion Rating Scale (Lucio et al. 1996) 1- secretions present around the laryngeal vestibule  - --       Secretion Description thin;clear  - --       Ice Chips elicited swallow;partially cleared secretions;aspirated;no response to aspiration  - --       Spontaneous Swallow frequency reduced  - --       Sensory reduced sensation  - --       Utensils Used Spoon;Cup;Straw  - --       Consistencies Trialed spoon;cup;straw;thin liquids;nectar-thick liquids;pudding/puree;regular textures  - --          FEES Interpretation    Oral Phase prolonged manipulation;prespill of liquids into pharynx  - --          Initiation of Pharyngeal Swallow    Initiation of Pharyngeal Swallow bolus in pyriform sinuses  - --       Pharyngeal Phase impaired pharyngeal phase of swallowing  - --       Penetration Before the Swallow thin liquids;secondary to reduced back of tongue control;secondary to delayed swallow initiation or mistiming  - --       Aspiration During the Swallow thin liquids;secondary to delayed swallow initiation or mistiming;secondary to reduced laryngeal elevation;secondary to reduced vestibular closure  - --       Penetration After the Swallow nectar-thick liquids;secondary to residue;in valleculae;in pyriform sinuses;other (see comments)  via straw only  - --       Depth of Penetration deep  - --       Response to Penetration No  - --       No spontaneous response to penetration and non-effective laryngeal clearance with cue (see comments)  - --       Response to Aspiration No  - --       No spontaneous response to aspiration with non-effective subglottic clearance with cue (see comments);other (see comments)  cued cough  - --       Rosenbek's Scale  thin:;8-->Level 8;nectar:;3-->Level 3;pudding/puree:;regular textures:;1-->Level 1  NTL level 3 via straw only  - --       Residue thin liquids;nectar-thick liquids;diffuse within pharynx;secondary to reduced posterior pharyngeal wall stripping;secondary to reduced laryngeal elevation;secondary to reduced hyolaryngeal excursion  Increased residue w/ thins & NTL via straw  - --       Response to Residue partial residue clearance;with spontaneous subsequent swallow;with cued swallow  - --       Attempted Compensatory Maneuvers bolus presentation style;bolus size;additional subsequent swallow;multiple swallows;throat clear after swallow;chin tuck  - --       Response to Attempted Compensatory Maneuvers did not prevent aspiration;reduced residue  - --       Successful Compensatory Maneuver Competency patient able to;demonstrate compensations;with cues  - --       FEES Summary Continued moderate pharyngeal dysphagia. Deep penetration w/ thins before the swallow w/ subsequent silent aspiration occuring during & after the swallow. Cued cough weak and ineffective in clearing. Deep penetration w/ NTL via straw only; material continued to deepen and was never observed to fully clear. No penetration/aspiration w/ NTL via cup, pudding, or regular solid consistenices. Recommend cont regular diet w/ NTL, no mixed consistenices, no straw. SLP will cont to f/u for tx  - --          SLP Evaluation Clinical Impression    SLP Swallowing Diagnosis moderate;pharyngeal dysphagia  - --       Functional Impact risk of aspiration/pneumonia  - --       Rehab Potential/Prognosis, Swallowing good, to achieve stated therapy goals  - --       Swallow Criteria for Skilled Therapeutic Interventions Met demonstrates skilled criteria  - --          SLP Treatment Clinical Impressions    Treatment Assessment (SLP) -- pharyngeal dysphagia  -RS       Treatment Assessment Comments (SLP) -- Pt seen for tx. Great effort with exs. No  s/sx with any PO trial. Appears ready for repeat instrumental to assess for upgrade  -RS       Daily Summary of Progress (SLP) -- progress toward functional goals as expected  -RS       Plan for Continued Treatment (SLP) -- continue treatment per plan of care  -RS       Care Plan Review -- care plan/treatment goals reviewed  -          Recommendations    Therapy Frequency (Swallow) 5 days per week  - 5 days per week  -       Predicted Duration Therapy Intervention (Days) until discharge  - until discharge  -RS       SLP Diet Recommendation regular textures;no mixed consistencies;nectar thick liquids  - regular textures;no mixed consistencies;nectar thick liquids  -       Recommended Diagnostics -- reassess via FEES  -RS       Recommended Precautions and Strategies upright posture during/after eating;small bites of food and sips of liquid;no straw;multiple swallows per bite of food;multiple swallows per sip of liquid;general aspiration precautions;assist with feeding  - upright posture during/after eating;small bites of food and sips of liquid;no straw;multiple swallows per bite of food;multiple swallows per sip of liquid;general aspiration precautions;assist with feeding  -       Oral Care Recommendations Oral Care BID/PRN;Suction toothbrush  - Oral Care BID/PRN;Suction toothbrush  -       SLP Rec. for Method of Medication Administration meds whole;meds crushed;with puree;as tolerated  - meds whole;meds crushed;with puree;as tolerated  -       Monitor for Signs of Aspiration yes;notify SLP if any concerns  - yes;notify SLP if any concerns  -RS       Anticipated Discharge Disposition (SLP) inpatient rehabilitation facility  - inpatient rehabilitation facility  -                 User Key  (r) = Recorded By, (t) = Taken By, (c) = Cosigned By      Initials Name Effective Dates     Ghislaine Sparks MS CCC-SLP 05/12/23 -     RS Jordon Blackwood MS CCC-SLP 09/14/23 -                      EDUCATION  The patient has been educated in the following areas:   Dysphagia (Swallowing Impairment) Modified Diet Instruction.        SLP GOALS       Row Name 05/02/24 0935 05/01/24 0830          (LTG) Patient will demonstrate functional swallow for    Diet Texture (Demonstrate functional swallow) regular textures  - regular textures  -RS     Liquid viscosity (Demonstrate functional swallow) thin liquids  - thin liquids  -RS     [place]ugio (Demonstrate functional swallow) with minimal cues (75-90% accuracy)  - with minimal cues (75-90% accuracy)  -RS     Time Frame (Demonstrate functional swallow) by discharge  - by discharge  -RS     Progress/Outcomes (Demonstrate functional swallow) goal ongoing  - continuing progress toward goal  -RS        (STG) Patient will tolerate trials of    Consistencies Trialed (Tolerate trials) regular textures;nectar/ mildly thick liquids  - regular textures;nectar/ mildly thick liquids  -RS     Desired Outcome (Tolerate trials) without signs/symptoms of aspiration;without signs of distress;with adequate oral prep/transit/clearance  - without signs/symptoms of aspiration;without signs of distress;with adequate oral prep/transit/clearance  -RS     Refugio (Tolerate trials) with minimal cues (75-90% accuracy)  - with minimal cues (75-90% accuracy)  -RS     Time Frame (Tolerate trials) 1 week  - 1 week  -RS     Progress/Outcomes (Tolerate trials) goal ongoing  - continuing progress toward goal  -RS     Comment (Tolerate trials) -- No s/sx with any reg/nectar trial with breakfast tray  -RS        (STG) Lingual Strengthening Goal 1 (SLP)    Activity (Lingual Strengthening Goal 1, SLP) increase tongue back strength  - increase tongue back strength  -     Increase Tongue Back Strength lingual resistance exercises  - lingual resistance exercises  -RS     Refugio/Accuracy (Lingual Strengthening Goal 1, SLP) with moderate cues (50-74% accuracy)  -  with moderate cues (50-74% accuracy)  -RS     Time Frame (Lingual Strengthening Goal 1, SLP) 1 week  - 1 week  -RS     Progress/Outcomes (Lingual Strengthening Goal 1, SLP) goal ongoing  - continuing progress toward goal  -RS     Comment (Lingual Strengthening Goal 1, SLP) -- x10  -RS        ([facility]) Pharyngeal Strengthening Exercise Goal 1 (SLP)    Activity (Pharyngeal Strengthening Goal 1, SLP) increase tongue base retraction;increase squeeze/positive pressure generation;increase closure at entrance to airway/closure of airway at glottis;increase superior movement of the hyolaryngeal complex;increase anterior movement of the hyolaryngeal complex  - increase tongue base retraction;increase squeeze/positive pressure generation;increase closure at entrance to airway/closure of airway at glottis;increase superior movement of the hyolaryngeal complex;increase anterior movement of the hyolaryngeal complex  -RS     Increase Superior Movement of the Hyolaryngeal Complex effortful pitch glide (falsetto + pharyngeal squeeze)  - effortful pitch glide (falsetto + pharyngeal squeeze)  -RS     Increase Anterior Movement of the Hyolaryngeal Complex EMST  - EMST  -RS     Increase Closure at Entrance to Airway/Closure of Airway at Glottis supraglottic swallow;breath hold exercises  - supraglottic swallow;breath hold exercises  -RS     Increase Squeeze/Positive Pressure Generation hard effortful swallow  - hard effortful swallow  -RS     Increase Tongue Base Retraction reba  [facility] reba  -RS     [place]/Accuracy (Pharyngeal Strengthening Goal 1, SLP) with moderate cues (50-74% accuracy)  - with moderate cues (50-74% accuracy)  -RS     Time Frame (Pharyngeal Strengthening Goal 1, SLP) 1 week  - 1 week  -RS     Barriers (Pharyngeal Strengthening Goal 1, SLP) -- Exs practiced and reviewed. Difficulty with EMST and Reba  -RS     Progress/Outcomes (Pharyngeal Strengthening Goal 1, SLP) goal ongoing  - --                User Key  (r) = Recorded By, (t) = Taken By, (c) = Cosigned By      Initials Name Provider Type     Ghislaine Sparks MS CCC-SLP Speech and Language Pathologist    Jordon Buchanan MS CCC-SLP Speech and Language Pathologist                       Time Calculation:    Time Calculation- SLP       Row Name 05/02/24 1253             Time Calculation- SLP    SLP Start Time 0935  -      SLP Received On 05/02/24  -         Untimed Charges    71332-BC Fiberoptic Endo Eval Swallow Minutes 85  -MH         Total Minutes    Untimed Charges Total Minutes 85  -MH       Total Minutes 85  -MH                User Key  (r) = Recorded By, (t) = Taken By, (c) = Cosigned By      Initials Name Provider Type     Ghislaine Sparks MS CCC-SLP Speech and Language Pathologist                    Therapy Charges for Today       Code Description Service Date Service Provider Modifiers Qty    [id number]  ST FIBEROPTIC ENDO EVAL SWALL 6 5/2/2024 Ghislaine Sparks MS CCC-SLP GN 1                 Ghislaine Sparks MS CCC-SONIA  5/2/2024

## 2024-05-02 NOTE — PROGRESS NOTES
" LOS: 9 days   Patient Care Team:  Marysol Shrestha MD as PCP - General (Internal Medicine)  Cooper Apodaca MD as Consulting Physician (Cardiology)    Chief Complaint: [place] on CKD stage III  Cardiorenal syndrome.     Subjective       Subjective:  Symptoms:  Stable.  No shortness of breath, chest pain or chest pressure.    Diet:  Adequate intake.  No nausea or vomiting.        History taken from: patient    Objective     Vital Sign Min/Max for last 24 hours  Temp  Min: 97.7 °F (36.5 °C)  Max: 98.8 °F (37.1 °C)   BP  Min: 76/56  Max: 117/81   Pulse  Min: 59  Max: 96   Resp  Min: 14  Max: 20   SpO2  Min: 93 %  Max: 100 %   No data recorded   Weight  Min: 88.5 kg (195 lb 1.7 oz)  Max: 92.7 kg (204 lb 5.9 oz)     Flowsheet Rows      Flowsheet Row First Filed Value   Admission Height 190.5 cm (75\") Documented at 04/22/2024 1438   Admission Weight 77.1 kg (170 lb) Documented at 04/22/2024 1438            I/O this shift:  In: 28 [I.V.:28]  Out: 60 [Urine:60]  I/O last 3 completed shifts:  In: 1267.5 [P.O.:966; I.V.:201.5; IV Piggyback:100]  Out: 660 [Urine:660]    Objective:  General Appearance:  Comfortable.    Vital signs: (most recent): Blood pressure 102/61, pulse 68, temperature 98.8 °F (37.1 °C), temperature source Bladder, resp. rate 16, height 190 cm (74.8\"), weight 92.7 kg (204 lb 5.9 oz), SpO2 96%.  Vital signs are normal.  No fever.    Output: Producing urine.    HEENT: Normal HEENT exam.    Lungs:  Normal effort and normal respiratory rate.  Breath sounds clear to auscultation.  He is not in respiratory distress.  No decreased breath sounds or wheezes.    Heart: Normal rate.  Regular rhythm.  S1 normal and S2 normal.  No gallop.   Abdomen: Abdomen is soft.  Bowel sounds are normal.     Extremities: There is no dependent edema or local swelling.    Pulses: Distal pulses are intact.    Neurological: Patient is alert and oriented to person, place and time.  Normal strength.    Pupils:  Pupils are equal, " "round, and reactive to light.    Skin:  Pale.                Results Review:     I reviewed the patient's new clinical results.    WBC WBC   Date Value Ref Range Status   05/02/2024 10.65 3.40 - 10.80 10*3/mm3 Final   05/01/2024 8.92 3.40 - 10.80 10*3/mm3 Final   04/30/2024 9.12 3.40 - 10.80 10*3/mm3 Final      HGB Hemoglobin   Date Value Ref Range Status   05/02/2024 9.0 (L) 13.0 - 17.7 g/dL Final   05/01/2024 9.0 (L) 13.0 - 17.7 g/dL Final   04/30/2024 8.8 (L) 13.0 - 17.7 g/dL Final      HCT Hematocrit   Date Value Ref Range Status   05/02/2024 26.8 (L) 37.5 - 51.0 % Final   05/01/2024 27.0 (L) 37.5 - 51.0 % Final   04/30/2024 26.7 (L) 37.5 - 51.0 % Final      Platlets No results found for: \"LABPLAT\"   MCV MCV   Date Value Ref Range Status   05/02/2024 88.4 79.0 - 97.0 fL Final   05/01/2024 90.3 79.0 - 97.0 fL Final   04/30/2024 91.1 79.0 - 97.0 fL Final          Sodium Sodium   Date Value Ref Range Status   05/02/2024 136 136 - 145 mmol/L Final   05/01/2024 143 136 - 145 mmol/L Final   04/30/2024 140 136 - 145 mmol/L Final      Potassium Potassium   Date Value Ref Range Status   05/02/2024 4.4 3.5 - 5.2 mmol/L Final   05/01/2024 4.5 3.5 - 5.2 mmol/L Final   04/30/2024 4.3 3.5 - 5.2 mmol/L Final     Comment:     Slight hemolysis detected by analyzer. Result may be falsely elevated.      Chloride Chloride   Date Value Ref Range Status   05/02/2024 107 98 - 107 mmol/L Final   05/01/2024 112 (H) 98 - 107 mmol/L Final   04/30/2024 110 (H) 98 - 107 mmol/L Final      CO2 CO2   Date Value Ref Range Status   05/02/2024 17.0 (L) 22.0 - 29.0 mmol/L Final   05/01/2024 18.0 (L) 22.0 - 29.0 mmol/L Final   04/30/2024 17.0 (L) 22.0 - 29.0 mmol/L Final      BUN BUN   Date Value Ref Range Status   05/02/2024 82 (H) 8 - 23 mg/dL Final   05/01/2024 84 (H) 8 - 23 mg/dL Final   04/30/2024 82 (H) 8 - 23 mg/dL Final      Creatinine Creatinine   Date Value Ref Range Status   05/02/2024 3.30 (H) 0.76 - 1.27 mg/dL Final   05/01/2024 3.39 " "(H) 0.76 - 1.27 mg/dL Final   04/30/2024 2.68 (H) 0.76 - 1.27 mg/dL Final      Calcium Calcium   Date Value Ref Range Status   05/02/2024 7.8 (L) 8.6 - 10.5 mg/dL Final   05/01/2024 7.6 (L) 8.6 - 10.5 mg/dL Final   04/30/2024 7.8 (L) 8.6 - 10.5 mg/dL Final      PO4 No results found for: \"CAPO4\"   Albumin Albumin   Date Value Ref Range Status   05/02/2024 2.8 (L) 3.5 - 5.2 g/dL Final   05/01/2024 2.7 (L) 3.5 - 5.2 g/dL Final   04/30/2024 3.0 (L) 3.5 - 5.2 g/dL Final      Magnesium Magnesium   Date Value Ref Range Status   05/02/2024 2.8 (H) 1.6 - 2.4 mg/dL Final   04/30/2024 2.8 (H) 1.6 - 2.4 mg/dL Final      Uric Acid No results found for: \"URICACID\"     Medication Review: Yes    Assessment & Plan       Cardiac arrest    Type 2 diabetes mellitus with hyperglycemia    Hyperlipidemia LDL goal <70    Hypothyroidism (acquired)    Coronary artery disease involving native coronary artery of native heart with angina pectoris    Pacemaker    HFrEF (heart failure with reduced ejection fraction)    Anemia, chronic disease    Stage 3b chronic kidney disease    UTI (urinary tract infection)    Acute respiratory failure with hypoxia    Elevated troponin level not due myocardial infarction      Assessment & Plan      [place] on CKD stage IIIb/IV:  Baseline cr 1.8-2.2 mg/dl. Recent cr trend 2.2>2.37>2.5>2.63>3.3. UOP in last 24hr ~455. Etiology of [place] likely hemodynamic injury post vfib arrest vs decompensated cardiorenal syndrome. Taken off Zosyn due to concern for AIN. Urine eos 0%        CKD stage III/IV: GFR baseline 28-32ml/min. Follows with Dr Pal. Risk factor for CKD: DM, HTN, PVD and CAD. Serological workup negative in the past. Including ANCA, antiGBM     Met acidosis: Due to [place] on CKD     Cardiogenic shock: Requiring pressor support. ECHO post arrest showed EF 20%. Hx of severe LV disease. Cardiology following. Repeat ECHO shows EF 21-25%. Requiring pressor support to optimize blood pressure.    Volume status: On RA. Does " have dependent edema LE b/l     Hx of recurrent UTI: Recently treated for fungal UTI. Urology and ID following      Hx of HTN: Renal artery duplex negative for WILMA in the past.      Recs  [place] on CKD stage IIIb/IV due to above mentioned risk factor. Repeat ECHO showed EF 21-25%. Given severe LAD disease and low EF patient is not a good candidate for long term dialysis. I discussed with patient continued medical management and optimization of blood pressure and volume status for now.   - Diuretic challenge. Lasix 80mg IV x 1.  - Target MAP 65 or above  - Strict I/o  - Dose meds to eGFR            I discussed the patients findings and my recommendations with patient       Blake Tilley MD  05/02/24  11:20 EDT    Time:

## 2024-05-02 NOTE — PROGRESS NOTES
Intensive Care Follow-up     Hospital:  LOS: 9 days   Mr. Jermaine Singh, 79 y.o. male is followed for:   Cardiac arrest            History of present illness:    78yo M with a history of T2DM, Stage 3 CKD, bilateral DVTs, paroxysmal Afib, SSS s/p post-pacemaker placement, CHF, PVD, and left diabetic foot infection who presented to [facility] on 4/22/24 with weakness and a chronic wound on his RLE. UA was consistent with a UTI. He was started on Rocephin and admitted to Hospital Medicine.      On 4/25/24, he suffered a Vfib arrest and was intubated during CPR. He was transferred to the ICU. He was able to be extubated on 4/26/24. After extubation, his respiratory status was tenuous and family decided to make him a DNR/DNI.  Patient was seen by infectious disease and on antibiotics.  Also has been requiring norepinephrine at low-dose.  Midodrine has been started. Patient also developed shock liver which seems to be recovering.    Subjective   Interval History:  No acute events overnight.  Patient seems to be doing fair.  Remains on norepinephrine at 0.04 mics which we are unable to wean.  Repeat echocardiogram showed ejection fraction 21 to 25%.             The patient's past medical, surgical and social history were reviewed and updated in Epic as appropriate.       Objective     Infusions:  norepinephrine, 0.02-0.3 mcg/kg/min, Last Rate: 0.04 mcg/kg/min (05/02/24 1116)      Medications:  apixaban, 2.5 mg, Oral, Q12H  aspirin, 81 mg, Oral, Daily  atorvastatin, 20 mg, Oral, Nightly  castor oil-balsam peru, 1 Application, Topical, Daily  insulin glargine, 8 Units, Subcutaneous, Nightly  Insulin Lispro, 2-9 Units, Subcutaneous, 4x Daily AC & at Bedtime  lactobacillus acidophilus, 1 capsule, Oral, Daily  levothyroxine, 88 mcg, Oral, Q AM  midodrine, 10 mg, Oral, TID AC  Naloxegol Oxalate, 25 mg, Oral, QAM  pantoprazole, 40 mg, Oral, Q AM  polyethylene glycol, 17 g, Oral, Daily  senna-docusate sodium, 2 tablet, Oral,  "BID  sodium chloride, 10 mL, Intravenous, Q12H  tamsulosin, 0.4 mg, Oral, Daily        Vital Sign Min/Max for last 24 hours  Temp  Min: 97.7 °F (36.5 °C)  Max: 99.5 °F (37.5 °C)   BP  Min: 76/56  Max: 117/81   Pulse  Min: 59  Max: 96   Resp  Min: 14  Max: 20   SpO2  Min: 93 %  Max: 100 %   No data recorded       Input/Output for last 24 hour shift  05/01 0701 - 05/02 0700  In: 829.3 [P.O.:716; I.V.:113.3]  Out: 465 [Urine:465]      Objective:  Vital signs: (most recent): Blood pressure 98/62, pulse 70, temperature 99.5 °F (37.5 °C), temperature source Bladder, resp. rate 16, height 190 cm (74.8\"), weight 92.7 kg (204 lb 5.9 oz), SpO2 96%.            General Appearance: Sleeping but wakes up, no acute distress  Lungs:   B/L Breath sounds present with decreased breath sounds on bases, no wheezing heard, no crackles.   Heart: S1 and S2 present, no murmur  Abdomen: Soft, nontender, no guarding or rigidity, bowel sounds positive.  Extremities:   Trace edema, warm to touch.  Right lower extremity dressing intact  Neurologic:  Moving all four extremities. Good strength bilaterally.      Results from last 7 days   Lab Units 05/02/24  0433 05/01/24  0339 04/30/24  0447   WBC 10*3/mm3 10.65 8.92 9.12   HEMOGLOBIN g/dL 9.0* 9.0* 8.8*   PLATELETS 10*3/mm3 252 263 232     Results from last 7 days   Lab Units 05/02/24  0433 05/01/24  0339 04/30/24  0447 04/28/24  0405 04/27/24  0607   SODIUM mmol/L 136 143 140   < > 142   POTASSIUM mmol/L 4.4 4.5 4.3   < > 4.7   CO2 mmol/L 17.0* 18.0* 17.0*   < > 18.0*   BUN mg/dL 82* 84* 82*   < > 82*   CREATININE mg/dL 3.30* 3.39* 2.68*   < > 2.37*   MAGNESIUM mg/dL 2.8*  --  2.8*  --  2.9*   PHOSPHORUS mg/dL 4.9*  --  4.5  --   --    GLUCOSE mg/dL 165* 94 162*   < > 142*    < > = values in this interval not displayed.     Estimated Creatinine Clearance: 23.8 mL/min (A) (by C-G formula based on SCr of 3.3 mg/dL (H)).    Results from last 7 days   Lab Units 04/26/24  0313   PH, ARTERIAL pH units " 7.441   PCO2, ARTERIAL mm Hg 24.6*   PO2 ART mm Hg 118.0*       Images:   None new    I reviewed the patient's results and images.     Assessment & Plan   Impression        Cardiac arrest    Type 2 diabetes mellitus with hyperglycemia    Hyperlipidemia LDL goal <70    Hypothyroidism (acquired)    Coronary artery disease involving native coronary artery of native heart with angina pectoris    Pacemaker    HFrEF (heart failure with reduced ejection fraction)    Anemia, chronic disease    Stage 3b chronic kidney disease    UTI (urinary tract infection)    Acute respiratory failure with hypoxia    Elevated troponin level not due myocardial infarction       Plan        1.  Patient s/p cardiac arrest complicated by shock liver and acute on chronic renal failure.  Patient seems to be recovering.  Has severe cardiomyopathy with ejection fraction less than 20%.  Cardiology team is following.  Continue aspirin, statin.  Repeat echocardiogram  still shows a significantly reduced ejection fraction of 21 to 25%..  2.  Hemodynamically unstable still requiring norepinephrine.  Remains on high-dose midodrine as well.  Will attempt to wean pressors as tolerated.  Keep mean arterial pressure 70 to have better renal perfusion.  Will need to discuss with cardiology to see if he could be candidate for home inotropes for palliative reasons.  3.  Finished antibiotic course.  Observe off antibiotics.  4.  Oral intake improving.  Bowel regimen to continue.  5.  Continue current insulin regimen for hyperglycemia management.  Will make further adjustments as needed  6.  Resume Eliquis  7.  GI prophylaxis.  8.  Out of bed and mobilize.  9.  Renal function is stable overnight.  Urine output remains poor.  Nephrology team is planning to give diuretic challenge.  Will trend for now.    Continue close monitoring in ICU as remains at risk of decline.  Discussed extensively with cardiology.  Discussed with family at bedside.    Plan of care and  goals reviewed with multidisciplinary/antibiotic stewardship team during rounds.   I discussed the patient's findings and my recommendations with patient, family, and nursing staff     Time spent Critical care 30 min (exclusive of procedure time)  including high complexity decision making to assess, manipulate, and support vital organ system failure in this individual who has impairment of one or more vital organ systems such that there is a high probability of imminent or life threatening deterioration in the patient’s condition.      Antonio Angeles MD, FCCP  Pulmonary, Critical care and Sleep Medicine

## 2024-05-03 LAB
ALBUMIN SERPL-MCNC: 2.8 G/DL (ref 3.5–5.2)
ALBUMIN/GLOB SERPL: 1.1 G/DL
ALP SERPL-CCNC: 74 U/L (ref 39–117)
ALT SERPL W P-5'-P-CCNC: 106 U/L (ref 1–41)
ANION GAP SERPL CALCULATED.3IONS-SCNC: 13 MMOL/L (ref 5–15)
AST SERPL-CCNC: 13 U/L (ref 1–40)
BASOPHILS # BLD AUTO: 0.06 10*3/MM3 (ref 0–0.2)
BASOPHILS NFR BLD AUTO: 0.6 % (ref 0–1.5)
BILIRUB SERPL-MCNC: 0.4 MG/DL (ref 0–1.2)
BUN SERPL-MCNC: 79 MG/DL (ref 8–23)
BUN/CREAT SERPL: 23.4 (ref 7–25)
CALCIUM SPEC-SCNC: 7.9 MG/DL (ref 8.6–10.5)
CHLORIDE SERPL-SCNC: 109 MMOL/L (ref 98–107)
CO2 SERPL-SCNC: 18 MMOL/L (ref 22–29)
CREAT SERPL-MCNC: 3.38 MG/DL (ref 0.76–1.27)
DEPRECATED RDW RBC AUTO: 46.5 FL (ref 37–54)
EGFRCR SERPLBLD CKD-EPI 2021: 17.8 ML/MIN/1.73
EOSINOPHIL # BLD AUTO: 0.35 10*3/MM3 (ref 0–0.4)
EOSINOPHIL NFR BLD AUTO: 3.4 % (ref 0.3–6.2)
ERYTHROCYTE [DISTWIDTH] IN BLOOD BY AUTOMATED COUNT: 14.6 % (ref 12.3–15.4)
GLOBULIN UR ELPH-MCNC: 2.5 GM/DL
GLUCOSE BLDC GLUCOMTR-MCNC: 123 MG/DL (ref 70–130)
GLUCOSE BLDC GLUCOMTR-MCNC: 124 MG/DL (ref 70–130)
GLUCOSE BLDC GLUCOMTR-MCNC: 132 MG/DL (ref 70–130)
GLUCOSE BLDC GLUCOMTR-MCNC: 88 MG/DL (ref 70–130)
GLUCOSE BLDC GLUCOMTR-MCNC: 89 MG/DL (ref 70–130)
GLUCOSE SERPL-MCNC: 110 MG/DL (ref 65–99)
HCT VFR BLD AUTO: 27.3 % (ref 37.5–51)
HGB BLD-MCNC: 8.9 G/DL (ref 13–17.7)
IMM GRANULOCYTES # BLD AUTO: 0.12 10*3/MM3 (ref 0–0.05)
IMM GRANULOCYTES NFR BLD AUTO: 1.2 % (ref 0–0.5)
LYMPHOCYTES # BLD AUTO: 1.2 10*3/MM3 (ref 0.7–3.1)
LYMPHOCYTES NFR BLD AUTO: 11.6 % (ref 19.6–45.3)
MCH RBC QN AUTO: 29.6 PG (ref 26.6–33)
MCHC RBC AUTO-ENTMCNC: 32.6 G/DL (ref 31.5–35.7)
MCV RBC AUTO: 90.7 FL (ref 79–97)
MONOCYTES # BLD AUTO: 0.92 10*3/MM3 (ref 0.1–0.9)
MONOCYTES NFR BLD AUTO: 8.9 % (ref 5–12)
NEUTROPHILS NFR BLD AUTO: 7.72 10*3/MM3 (ref 1.7–7)
NEUTROPHILS NFR BLD AUTO: 74.3 % (ref 42.7–76)
NRBC BLD AUTO-RTO: 0 /100 WBC (ref 0–0.2)
PLATELET # BLD AUTO: 251 10*3/MM3 (ref 140–450)
PMV BLD AUTO: 10.4 FL (ref 6–12)
POTASSIUM SERPL-SCNC: 4.2 MMOL/L (ref 3.5–5.2)
PROT SERPL-MCNC: 5.3 G/DL (ref 6–8.5)
RBC # BLD AUTO: 3.01 10*6/MM3 (ref 4.14–5.8)
SODIUM SERPL-SCNC: 140 MMOL/L (ref 136–145)
WBC NRBC COR # BLD AUTO: 10.37 10*3/MM3 (ref 3.4–10.8)

## 2024-05-03 PROCEDURE — 85025 COMPLETE CBC W/AUTO DIFF WBC: CPT | Performed by: INTERNAL MEDICINE

## 2024-05-03 PROCEDURE — 25010000002 FUROSEMIDE PER 20 MG: Performed by: INTERNAL MEDICINE

## 2024-05-03 PROCEDURE — 99291 CRITICAL CARE FIRST HOUR: CPT | Performed by: INTERNAL MEDICINE

## 2024-05-03 PROCEDURE — 82948 REAGENT STRIP/BLOOD GLUCOSE: CPT

## 2024-05-03 PROCEDURE — 97530 THERAPEUTIC ACTIVITIES: CPT

## 2024-05-03 PROCEDURE — 63710000001 INSULIN GLARGINE PER 5 UNITS: Performed by: FAMILY MEDICINE

## 2024-05-03 PROCEDURE — 29580 STRAPPING UNNA BOOT: CPT

## 2024-05-03 PROCEDURE — 97597 DBRDMT OPN WND 1ST 20 CM/<: CPT

## 2024-05-03 PROCEDURE — 80053 COMPREHEN METABOLIC PANEL: CPT | Performed by: INTERNAL MEDICINE

## 2024-05-03 PROCEDURE — 97535 SELF CARE MNGMENT TRAINING: CPT

## 2024-05-03 RX ORDER — FUROSEMIDE 10 MG/ML
80 INJECTION INTRAMUSCULAR; INTRAVENOUS DAILY
Status: DISCONTINUED | OUTPATIENT
Start: 2024-05-03 | End: 2024-05-08 | Stop reason: HOSPADM

## 2024-05-03 RX ADMIN — ATORVASTATIN CALCIUM 20 MG: 20 TABLET, FILM COATED ORAL at 20:53

## 2024-05-03 RX ADMIN — MIDODRINE HYDROCHLORIDE 10 MG: 10 TABLET ORAL at 05:56

## 2024-05-03 RX ADMIN — MIDODRINE HYDROCHLORIDE 10 MG: 10 TABLET ORAL at 13:14

## 2024-05-03 RX ADMIN — APIXABAN 2.5 MG: 2.5 TABLET, FILM COATED ORAL at 20:53

## 2024-05-03 RX ADMIN — NALOXEGOL OXALATE 25 MG: 25 TABLET, FILM COATED ORAL at 08:47

## 2024-05-03 RX ADMIN — Medication 1 APPLICATION: at 08:51

## 2024-05-03 RX ADMIN — APIXABAN 2.5 MG: 2.5 TABLET, FILM COATED ORAL at 08:47

## 2024-05-03 RX ADMIN — Medication 1 CAPSULE: at 08:47

## 2024-05-03 RX ADMIN — MIDODRINE HYDROCHLORIDE 10 MG: 10 TABLET ORAL at 20:52

## 2024-05-03 RX ADMIN — HYDROCODONE BITARTRATE AND ACETAMINOPHEN 1 TABLET: 5; 325 TABLET ORAL at 15:01

## 2024-05-03 RX ADMIN — TAMSULOSIN HYDROCHLORIDE 0.4 MG: 0.4 CAPSULE ORAL at 08:47

## 2024-05-03 RX ADMIN — ASPIRIN 81 MG CHEWABLE TABLET 81 MG: 81 TABLET CHEWABLE at 08:47

## 2024-05-03 RX ADMIN — ACETAMINOPHEN 650 MG: 325 TABLET ORAL at 17:27

## 2024-05-03 RX ADMIN — INSULIN GLARGINE 8 UNITS: 100 INJECTION, SOLUTION SUBCUTANEOUS at 20:52

## 2024-05-03 RX ADMIN — FUROSEMIDE 80 MG: 10 INJECTION, SOLUTION INTRAMUSCULAR; INTRAVENOUS at 13:14

## 2024-05-03 RX ADMIN — LEVOTHYROXINE SODIUM 88 MCG: 88 TABLET ORAL at 05:56

## 2024-05-03 RX ADMIN — Medication 10 ML: at 20:53

## 2024-05-03 RX ADMIN — PANTOPRAZOLE SODIUM 40 MG: 40 TABLET, DELAYED RELEASE ORAL at 05:56

## 2024-05-03 RX ADMIN — ACETAMINOPHEN 650 MG: 325 TABLET ORAL at 00:52

## 2024-05-03 RX ADMIN — Medication 10 ML: at 08:48

## 2024-05-03 NOTE — PLAN OF CARE
Goal Outcome Evaluation:      No acute events this shift. VSS. Levo continued at low dose. Lasix given; UOP 1100 ml's. Plan for family meeting with hospice Monday. Family updated at bedside.

## 2024-05-03 NOTE — PLAN OF CARE
Goal Outcome Evaluation:  Plan of Care Reviewed With: patient        Progress: improving  Outcome Evaluation: Pt demonstrating good improvements in BLE edema and skin integrity in response to compression wraps. PT able to slough from wound bed to promote healing and improved granulation. PT encouraged BLE elevation and ankle pumps for edema management. Pt would continue to benefit from unna boots changes every 2-3 days to further promote wound healing, venous return, reduce limb girth, and improve skin integrity

## 2024-05-03 NOTE — PROGRESS NOTES
Clinical Nutrition     Reason for Visit: MDR, Follow-up protocol      Patient Name: Jermaine Singh  YOB: 1945  MRN: [id number]  Date of Encounter: 05/03/24 17:27 EDT  Admission date: 4/22/2024      Nutrition Assessment   Admission Diagnosis:  UTI (urinary tract infection) [N39.0]      Problem List:    Cardiac arrest    Type 2 diabetes mellitus with hyperglycemia    Hyperlipidemia LDL goal <70    Hypothyroidism (acquired)    Coronary artery disease involving native coronary artery of native heart with angina pectoris    Pacemaker    HFrEF (heart failure with reduced ejection fraction)    Anemia, chronic disease    Stage 3b chronic kidney disease    UTI (urinary tract infection)    Acute respiratory failure with hypoxia    Elevated troponin level not due myocardial infarction        PMH:   He  has a past medical history of Allergic, Arthritis, Asthma, Coronary artery disease, Diabetes mellitus (2000), Diabetic foot infection (07/23/2023), Disease of thyroid gland, Elevated serum creatinine (11/15/2022), Elevated troponin (10/02/2022), Generalized weakness (11/15/2022), History of fracture as a child, Hyperlipidemia, Hypertension, Hypothyroidism, PAF (paroxysmal atrial fibrillation) (07/23/2023), Peripheral neuropathy, Peripheral vascular disease, Pleural effusion, bilateral (11/15/2022), Proximal phalanx fracture of the second digit extending into the second metatarsal joint (07/28/2022), RBBB, Right knee pain, Status post amputation of great toe and second toe of left foot (07/23/2023), Status post amputation of great toe, left (11/15/2022), Unstable angina (02/12/2019), and Vitamin D deficiency.    PSH:  He  has a past surgical history that includes Eye surgery (Bilateral); Leg Surgery; Tonsillectomy; Appendectomy; Colonoscopy; Laceration Repair; Cardiac catheterization (N/A, 02/14/2019); Knee arthroscopy; Coronary artery bypass graft (N/A, 03/22/2019); Interventional radiology  procedure (N/A, 07/29/2021); Cardiac electrophysiology procedure (N/A, 05/18/2022); Foot amputation through metatarsal (Left, 08/02/2022); Finger amputation (Left, 01/17/2023); Fracture surgery; and Cardiac surgery.      Applicable Nutrition Concerns:   Skin:L 3rd toe ulcer, R foot blisters, RLE wound,  B gluteal MASD  GI:last bm 5-2      Applicable Interval History:     V-fib arrest 4-25-24  ARF/VENT 4-25-24  Extubated 4-26-24    SLP Recommendation and Plan  SLP Swallowing Diagnosis: moderate, pharyngeal dysphagia (05/02/24 0935)  SLP Diet Recommendation: regular textures, no mixed consistencies, nectar thick liquids (05/02/24 0935)  Recommended Precautions and Strategies: upright posture during/after eating, small bites of food and sips of liquid, no straw, multiple swallows per bite of food, multiple swallows per sip of liquid, general aspiration precautions, assist with feeding (05/02/24 0935)  Reported/Observed/Food/Nutrition Related History:     5-2: pt sitting up in chair, reports his appetite is waning, dislikes magic cups, is not hungry at present, c/o pain    Per RN: pt made 1250ml UOP last night, Palliative consulted today, plan for pt + family to meet with Hospice on Monday 5-1: pt resting in bed, is lethargic, reports not much appetite, family at bedside    Per RN: pt's mental status waxes and wanes, has been more talkative today, did not like his breakfast, did eat 3/4 of his magic cup, plan for repeat FEES tomorrow    4-29:Pt resting in bed, reports fair appetite, states UBW is 170lb    Labs    Labs Reviewed: Yes     Results from last 7 days   Lab Units 05/03/24  0441 05/02/24  0433 05/01/24  0339 04/30/24  0447 04/28/24  0405 04/27/24  0607   GLUCOSE mg/dL 110* 165* 94 162*   < > 142*   BUN mg/dL 79* 82* 84* 82*   < > 82*   CREATININE mg/dL 3.38* 3.30* 3.39* 2.68*   < > 2.37*   SODIUM mmol/L 140 136 143 140   < > 142   CHLORIDE mmol/L 109* 107 112* 110*   < > 110*   POTASSIUM mmol/L 4.2 4.4 4.5  "4.3   < > 4.7   PHOSPHORUS mg/dL  --  4.9*  --  4.5  --   --    MAGNESIUM mg/dL  --  2.8*  --  2.8*  --  2.9*   ALT (SGPT) U/L 106* 132* 165* 218*   < > 626*    < > = values in this interval not displayed.       Results from last 7 days   Lab Units 05/03/24  0441 05/02/24  0433 05/01/24  0339   ALBUMIN g/dL 2.8* 2.8* 2.7*       Results from last 7 days   Lab Units 05/03/24  1716 05/03/24  1216 05/03/24  1211 05/03/24  0726 05/02/24 2011 05/02/24  1646   GLUCOSE mg/dL 124 89 88 123 151* 157*     Lab Results   Lab Value Date/Time    HGBA1C 8.50 (H) 04/23/2024 0550    HGBA1C 8.70 (H) 07/23/2023 2235                 Medications    Medications Reviewed: Yes    Intake/Ouptut 24 hrs (0701 - 0700)   I&O's Reviewed: Yes     Intake & Output (last day)         05/02 0701 05/03 0700 05/03 0701 05/04 0700    P.O. 240 480    I.V. (mL/kg) 130.7 (1.4) 34 (0.4)    Total Intake(mL/kg) 370.7 (4.1) 514 (5.7)    Urine (mL/kg/hr) 1975 (0.9) 575 (0.6)    Stool 0 0    Total Output 1975 575    Net -1604.3 -61          Stool Unmeasured Occurrence 1 x 1 x              Anthropometrics     Flowsheet Rows      Flowsheet Row First Filed Value   Admission Height 190.5 cm (75\") Documented at 04/22/2024 1438   Admission Weight 77.1 kg (170 lb) Documented at 04/22/2024 1438          Height: Height: 190 cm (74.8\")  Last Filed Weight: Weight: 90.4 kg (199 lb 4.7 oz) (05/03/24 0448)  Method: Weight Method: Bed scale  BMI: BMI (Calculated): 25  BMI classification: Normal: 18.5-24.9kg/m2    UBW: 170lb    Weight change: 22lb wt gain since admission per EMR, ? Inaccurate bedscale wt     Weight       Weight (kg) Weight (lbs) Weight Method Visit Report   1/17/2023 90.447 kg  199 lb 6.4 oz       90.447 kg  199 lb 6.4 oz      1/18/2023 97.569 kg  215 lb 1.6 oz  Bed scale      91.899 kg  202 lb 9.6 oz  Standing scale     1/19/2023 93.895 kg  207 lb  Bed scale     1/20/2023 92.987 kg  205 lb  Bed scale     1/21/2023 92.761 kg  204 lb 8 oz  Bed scale   " "  2023 91.309 kg  201 lb 4.8 oz  Bed scale     2023 91.173 kg  201 lb   --    2/15/2023 89.812 kg  198 lb   --    3/8/2023 88.905 kg  196 lb   --    3/14/2023 88.905 kg  196 lb   --    3/22/2023 88.451 kg  195 lb   --    2023 84.823 kg  187 lb   --    2023 84.823 kg  187 lb   --    2023 80.287 kg  177 lb  Stated      81.285 kg  179 lb 3.2 oz      2023 80.287 kg  177 lb   --    2023 83.915 kg  185 lb   --    10/18/2023 76.658 kg  169 lb      10/19/2023 76.658 kg  169 lb   --    3/5/2024 77.111 kg  170 lb   --    3/9/2024 77.1 kg  169 lb 15.6 oz  Estimated     2024 77.111 kg  170 lb  Stated     2024 77.701 kg  171 lb 4.8 oz  Bed scale     2024 77 kg  169 lb 12.1 oz      2024 81.5 kg  179 lb 10.8 oz  Bed scale     2024 81.2 kg  179 lb 0.2 oz  Bed scale     2024 84.4 kg  186 lb 1.1 oz  Bed scale     2024 87.7 kg  193 lb 5.5 oz          Needs Assessment   Date: 5-3-24    Height used:Height: 190 cm (74.8\")  Weights used: 171lb presumed dry weight      Estimated Calorie needs: ~1900kcal  Method: 25 Kcals/Kkcal  Method:  MSJ x 1.2: 1897kcal    Estimated Protein needs: ~62g protein with current renal function  Method: .08-1.2g protein: 62-94g protein      Nutrition Focused Physical Exam     Date:     Unable to perform exam due to: Potential for patient discomfort      Current Nutrition Prescription     PO: Diet: Cardiac, Diabetic; Healthy Heart (2-3 Na+); Consistent Carbohydrate; No Mixed Consistencies, No Straw, Feeding Assistance - Nursing; Texture: Regular (IDDSI 7); Fluid Consistency: [place] Thick  Oral Nutrition Supplement: Magic Cups 3x/day  Intake:  29% of 6 meals      Nutrition Diagnosis   Date: 24 Updated: 5-3  Problem Inadequate oral intake   Etiology Per Clinical Status   Signs/Symptoms Po intake 29%       Goal:   General: Nutrition to support treatment  PO: Increase intake  EN/PN: N/A    Nutrition Intervention      Follow treatment " progress, Advise alternate selection, Interview for preferences, Adjusted supplement, Encourage intake    Change oral supplement to yogurt 3x/day    Poor intake since admission, consider supplemental EN depending on GOC    TF recs if needed:Novasource Renal @40ml/hr, free water @10ml/hr    TF at goal volume will provide 800ml, 1600kcal, 84% kcal needs, 73g protein, 118% protein needs, 776ml free water      Monitoring/Evaluation:   Per protocol, I&O, PO intake, Supplement intake, Pertinent labs, Weight, Skin status, GI status, Symptoms, Swallow function, Hemodynamic stability      Rosalinda Edge, BHUMIKA  Time Spent: 45min

## 2024-05-03 NOTE — PROGRESS NOTES
INFECTIOUS DISEASE Progress Note    Jermaine Singh  1945  [id number]    Admission Date: 4/22/2024      Requesting Provider: No ref. provider found  Evaluating Physician: Lincoln Padilla MD    Reason for Consultation:  UTI    History of present illness:    4/23/24: Patient is a 79 y.o. male  with a history of type 2 diabetes mellitus, peripheral neuropathy, stage III chronic kidney disease, bilateral DVTs, paroxysmal atrial fibrillation, sick sinus syndrome status post pacemaker placement, CHF, peripheral vascular disease, and left diabetic foot infection who is seen today for evaluation of UTI.  He had a history of UTI last month with urine cultures and early March growing yeast.  He was apparently treated with an antifungal agent.    Over the last few days he noted the onset of profound weakness and fatigue.  He also noticed some dysuria.  He was brought to the emergency room and found to have pyuria and bacteriuria with leukocytosis.   Urine culture was sent and he was started on ceftriaxone.   His white blood cell count today is 11.8.   He was noted to have a blister over his right fifth metatarsal head and a chronic right pretibial wound.    4/24/24: He remains afebrile.  His urine culture was finalized as negative.  I have asked the laboratory to hold the urine culture for yeast but they did not and it was disposed of last night.   He states that his dysuria is improved today.  Feels better.     4/25/2024:  He remains afebrile.  White blood cell count today is 6.7.  Creatinine is 1.99.   Urinalysis yesterday revealed too numerous to count white blood cells.  He denied dysuria this morning.  After I saw him he suffered a V-fib cardiac arrest and was transferred to the ICU.  He is endotracheally intubated    4/26/24:   His maximum temperature over the last 24 hours is 99.5.  Repeat urine culture from 4/24 is pending.  He remains on ventilatory support.    Creatinine is 2.28.  ALT is  1028.  White  blood cell count is 9.95.  O2 saturation is 100% on an FiO2 of 30%.  Chest x-ray reveals no pulmonary infiltrates. \    Later this morning he was extubated.  He is confused.  He knows his name and his birthdate.  He cannot answer more complex questions.    4/27/2024:  His maximum temperature over the last 24 hours is 99.5.  His left ventricular ejection fraction is less than 20%.  His white blood cell count is 2.37.  ALT is down to 626.   Chest x-ray 4/27 reveals moderate hemoglobin airspace disease.   O2 saturation is 99% on 4 L.   He is on Zosyn therapy.   Later today his oxygen demand increased and he was placed   On BiPAP.     4/28/2024:   He has remained afebrile.   White blood cell count is 10.1. His creatinine is 2.65 ALT is 450.  His oxygen demand is down to 1 liter today. Chest x-ray today reveals interval improvement in the right upper lobe infiltrate.  He is coughing and has some sputum production.  He remains confused and did not recognize his wife and daughter today      4/29/2024: He remains afebrile.  His creatinine is 2.52.  ALT is 320.  White blood cell count is 9.3.    He is now on 1 L with an O2 saturation of 96%.  He is more alert and responsive today.  He is less confused.     4/30/2024: He has remained afebrile.  Creatinine is 2.68.  ALT is 218.   White blood cell count is 9.1.  room air. room air.  His confusion is improved today.  He recognized his wife today.  He denies increased cough and sputum production.  He knows me by name today.    5/1/2024:  He remains afebrile.  His creatinine today is 3.39.   White blood cell count is 8.9. O2 saturation is 97% on room air.      5/2/2024:  He remains afebrile.  His creatinine today is 3.3.  White blood cell count is 10.7.   Urine eosinophil smear was 0. He has continued to require vasopressor therapy.   Echocardiogram from yesterday reveals a 21-25% ejection fraction.   He feels better today.  His daughter indicates that  is cognitive function has  improved and he is no longer confused.    5/3/2024:  Maximum temperature over the last 24 hours is 99.4.  Creatinine is 3.38.    ALT is 106.   white blood cell count is 10.4.  O2 saturation is 94% on room air.  He denies increased dyspnea.  He is still requiring low-dose vasopressor support.    Past Medical History:   Diagnosis Date    Allergic     Arthritis     Asthma     Coronary artery disease     Diabetes mellitus 2000    started on inuslin 12/2018; started on po meds in 2000; checking blood sugars daily     Diabetic foot infection 07/23/2023    Disease of thyroid gland     po meds daily for hypothyroidism     Elevated serum creatinine 11/15/2022    Elevated troponin 10/02/2022    Generalized weakness 11/15/2022    History of fracture as a child     rt leg- severe     Hyperlipidemia     Hypertension     Hypothyroidism     PAF (paroxysmal atrial fibrillation) 07/23/2023    Peripheral neuropathy     Peripheral vascular disease     s/p angiogram 2/19-needs stent in left leg     Pleural effusion, bilateral 11/15/2022    Proximal phalanx fracture of the second digit extending into the second metatarsal joint 07/28/2022    RBBB     Right knee pain     Status post amputation of great toe and second toe of left foot 07/23/2023    Status post amputation of great toe, left 11/15/2022    Unstable angina 02/12/2019    Added automatically from request for surgery [id number]    Vitamin D deficiency        Past Surgical History:   Procedure Laterality Date    AMPUTATION DIGIT Left 01/17/2023    Procedure: AMPUTATION DIGIT LEFT;  Surgeon: Gopal Márquez MD;  Location: [facility] OR;  Service: Vascular;  Laterality: Left;    APPENDECTOMY      CARDIAC CATHETERIZATION N/A 02/14/2019    Procedure: Left Heart Cath;  Surgeon: Cooper Apodaca MD;  Location:  [facility];  Service: Cardiology    CARDIAC ELECTROPHYSIOLOGY PROCEDURE N/A 05/18/2022    Procedure: DEVICE IMPLANT;  Surgeon: Cooper Apodaca MD;   Location:  [facility];  Service: Cardiology;  Laterality: N/A;    CARDIAC SURGERY      COLONOSCOPY      CORONARY ARTERY BYPASS GRAFT N/A 03/22/2019    Procedure: MEDIAN STERNOTOMY, CORONARY ARTERY BYPASS GRAFT X3, UTILIZING THE LEFT INTERNAL MAMMARY ARTERY, EVH AND OPEN HARVEST OF THE RIGHT GREATER SAPHENOUS VEIN, EXPLORATION OF THE LEFT LEG;  Surgeon: Ap Au MD;  Location:  [facility] OR;  Service: Cardiothoracic    EYE SURGERY Bilateral     cataracts     FRACTURE SURGERY      INTERVENTIONAL RADIOLOGY PROCEDURE N/A 07/29/2021    Procedure: Abdominal Aortagram with Runoff;  Surgeon: Jermaine Hernandez MD;  Location:  [facility];  Service: Cardiovascular;  Laterality: N/A;    KNEE ARTHROSCOPY      right x 2, left x 1    LACERATION REPAIR      right leg    LEG SURGERY      2 for fracture of rt leg     TONSILLECTOMY      Adnoidectomy    TRANS METATARSAL AMPUTATION Left 08/02/2022    Procedure: GREAT TOE AMPUTATION LEFT;  Surgeon: Gopal Márquez MD;  Location:  [facility] OR;  Service: Vascular;  Laterality: Left;       Family History   Problem Relation Age of Onset    Coronary artery disease Mother     Diabetes Mother     Hyperlipidemia Mother     Cancer Father        Social History     Socioeconomic History    Marital status:     Number of children: 2   Tobacco Use    Smoking status: Never     Passive exposure: Past    Smokeless tobacco: Never   Vaping Use    Vaping status: Never Used   Substance and Sexual Activity    Alcohol use: Not Currently     Comment: every 2-3 months    Drug use: No    Sexual activity: Not Currently     Partners: Female       Allergies   Allergen Reactions    Vancomycin Other (See Comments)     Kidney failure per last admission         Medication:    Current Facility-Administered Medications:     acetaminophen (TYLENOL) tablet 650 mg, 650 mg, Oral, Q6H PRN, Maricel Peraza, APRN, 650 mg at 05/03/24 0052    albuterol (PROVENTIL) nebulizer  solution 0.083% 2.5 mg/3mL, 2.5 mg, Nebulization, BID PRN, Nathalia Mckeon DNP, APRN, 2.5 mg at 04/30/24 0755    apixaban (ELIQUIS) tablet 2.5 mg, 2.5 mg, Oral, Q12H, Antonio Angeles MD, 2.5 mg at 05/02/24 2108    aspirin chewable tablet 81 mg, 81 mg, Oral, Daily, Maricel Peraza APRN, 81 mg at 05/02/24 1036    atorvastatin (LIPITOR) tablet 20 mg, 20 mg, Oral, Nightly, Golden Madison MD, 20 mg at 05/02/24 2108    sennosides-docusate (PERICOLACE) 8.6-50 MG per tablet 2 tablet, 2 tablet, Oral, BID, 2 tablet at 05/02/24 2107 **AND** polyethylene glycol (MIRALAX) packet 17 g, 17 g, Oral, Daily PRN, 17 g at 04/30/24 2038 **AND** bisacodyl (DULCOLAX) EC tablet 5 mg, 5 mg, Oral, Daily PRN, 5 mg at 05/01/24 0956 **AND** bisacodyl (DULCOLAX) suppository 10 mg, 10 mg, Rectal, Daily PRN, Antonio Angeles MD    castor oil-balsam peru (VENELEX) ointment 1 Application, 1 Application, Topical, Daily, Eyad Ledesma MD    dextrose (D50W) (25 g/50 mL) IV injection 25 g, 25 g, Intravenous, Q15 Min PRN, Maricel Peraza APRN    glucagon (GLUCAGEN) injection 1 mg, 1 mg, Intramuscular, Q15 Min PRN, Maricel Peraza APRN    HYDROcodone-acetaminophen (NORCO) 5-325 MG per tablet 1 tablet, 1 tablet, Oral, Q4H PRN, Vazquez Hailee V., DO, 1 tablet at 05/02/24 1441    insulin glargine (LANTUS, SEMGLEE) injection 8 Units, 8 Units, Subcutaneous, Nightly, Luz Elena Batista, DO, 8 Units at 05/02/24 2109    Insulin Lispro (humaLOG) injection 2-9 Units, 2-9 Units, Subcutaneous, 4x Daily AC & at Bedtime, Antonio Angeles MD, 2 Units at 05/02/24 2109    lactobacillus acidophilus (RISAQUAD) capsule 1 capsule, 1 capsule, Oral, Daily, Maricel Peraza, APRN, 1 capsule at 05/02/24 1036    levothyroxine (SYNTHROID, LEVOTHROID) tablet 88 mcg, 88 mcg, Oral, Q AM, Maricel Peraza, DAVID, 88 mcg at 05/03/24 0556    Magnesium Low Dose Replacement - Follow Nurse / BPA Driven Protocol, , Does not apply, PRN, Maricel Peraza  DAVID CARVER    midodrine (PROAMATINE) tablet 10 mg, 10 mg, Oral, TID AC, Golden Madison MD, 10 mg at 24 0556    Naloxegol Oxalate (MOVANTIK) tablet 25 mg, 25 mg, Oral, QAM, Antonio Angeles MD, 25 mg at 24 1036    nitroglycerin (NITROSTAT) SL tablet 0.4 mg, 0.4 mg, Sublingual, Q5 Min PRN, Maricel Peraza APRN    norepinephrine (LEVOPHED) 8 mg in 250 mL NS infusion (premix), 0.02-0.3 mcg/kg/min, Intravenous, Titrated, Eyad Ledesma MD, Last Rate: 4.37 mL/hr at 24 0600, 0.03 mcg/kg/min at 24 0600    pantoprazole (PROTONIX) EC tablet 40 mg, 40 mg, Oral, Q AM, Antonio Angeles MD, 40 mg at 24 0556    polyethylene glycol (MIRALAX) packet 17 g, 17 g, Oral, Daily, Artur Avila MD, 17 g at 24 1037    prochlorperazine (COMPAZINE) injection 5 mg, 5 mg, Intravenous, Q6H PRN, Maricel Peraza APRN, 2.5 mg at 24 1316    sodium chloride 0.9 % flush 10 mL, 10 mL, Intravenous, PRN, Case, Hailee V., DO    sodium chloride 0.9 % flush 10 mL, 10 mL, Intravenous, Q12H, Case, Hailee V., DO, 10 mL at 24 2109    tamsulosin (FLOMAX) 24 hr capsule 0.4 mg, 0.4 mg, Oral, Daily, Maricel Peraza APRN, 0.4 mg at 24 1036    Antibiotics:  Anti-Infectives (From admission, onward)      Ordered     Dose/Rate Route Frequency Start Stop    24 1356  piperacillin-tazobactam (ZOSYN) 3.375 g IVPB in 100 mL NS MBP (CD)        Ordering Provider: Eyad Ledesma MD    3.375 g  over 30 Minutes Intravenous Once 24 1445 24 0247              Review of Systems:  See HPI      Physical Exam:   Vital Signs  Temp (24hrs), Av °F (37.2 °C), Min:98.6 °F (37 °C), Max:99.5 °F (37.5 °C)    Temp  Min: 98.6 °F (37 °C)  Max: 99.5 °F (37.5 °C)  BP  Min: 80/66  Max: 120/73  Pulse  Min: 59  Max: 90  Resp  Min: 14  Max: 18  SpO2  Min: 92 %  Max: 99 %    GENERAL:   Alert and responsive.  HEENT: Normocephalic, atraumatic.  PERRL. EOMI. No conjunctival injection. No  "icterus.  No labial ulcers  NECK: Supple   HEART:  irregular rate   LUNGS:     Clear anteriorly  ABDOMEN: Soft, nontender, nondistended. No rebound or guarding. NO mass or HSM.  EXT: His right distal leg is in an  Unna boot.   Right arm arterial line has no erythema  Right arm PICC has no erythema  :   Fonseca catheter in place.  MSK: No joint effusions or erythema  SKIN: Warm and dry without cutaneous eruptions on Inspection/palpation.    NEURO:    He is alert and responsive.   This conversation is appropriate.  He moves all of his extremities.    Laboratory Data    Results from last 7 days   Lab Units 05/03/24  0441 05/02/24  0433 05/01/24  0339   WBC 10*3/mm3 10.37 10.65 8.92   HEMOGLOBIN g/dL 8.9* 9.0* 9.0*   HEMATOCRIT % 27.3* 26.8* 27.0*   PLATELETS 10*3/mm3 251 252 263     Results from last 7 days   Lab Units 05/03/24  0441   SODIUM mmol/L 140   POTASSIUM mmol/L 4.2   CHLORIDE mmol/L 109*   CO2 mmol/L 18.0*   BUN mg/dL 79*   CREATININE mg/dL 3.38*   GLUCOSE mg/dL 110*   CALCIUM mg/dL 7.9*     Results from last 7 days   Lab Units 05/03/24  0441   ALK PHOS U/L 74   BILIRUBIN mg/dL 0.4   ALT (SGPT) U/L 106*   AST (SGOT) U/L 13                         Estimated Creatinine Clearance: 22.7 mL/min (A) (by C-G formula based on SCr of 3.38 mg/dL (H)).      Microbiology:  No results found for: \"ACANTHNAEG\", \"AFBCX\", \"BPERTUSSISCX\", \"BLOODCX\"  No results found for: \"BCIDPCR\", \"CXREFLEX\", \"CSFCX\", \"CULTURETIS\"  No results found for: \"CULTURES\", \"HSVCX\", \"URCX\"  No results found for: \"EYECULTURE\", \"GCCX\", \"HSVCULTURE\", \"LABHSV\"  No results found for: \"LEGIONELLA\", \"MRSACX\", \"MUMPSCX\", \"MYCOPLASCX\"  No results found for: \"NOCARDIACX\", \"STOOLCX\"  Urine Culture   Date Value Ref Range Status   04/22/2024 No growth  Preliminary     No results found for: \"VIRALCULTU\", \"WOUNDCX\"        Radiology:  Imaging Results (Last 72 Hours)       Procedure Component Value Units Date/Time    SLP FEES - Fiberoptic Endo Eval Swallow " [[id number]] Resulted: 05/02/24 1100     Updated: 05/02/24 1100    Narrative:      This procedure was auto-finalized with no dictation required.          I read his chest x-ray of 4/28      Impression:    1.    Right upper lobe aspiration pneumonia-he is S/P Zosyn therapy.  He is clinically and radiographically improved.  I discontinued Zosyn on 5/1   2.  V-fib arrest-status post resuscitation.    3.   Acute kidney injury-superimposed on stage IIIb chronic kidney disease.   4.  Chronic right pretibial dermatitis-without evidence of cellulitis  5.  Type 2 diabetes mellitus  6.  coronary artery disease/status post CABG, 3/22/2019  7.  Severe systolic heart failure- with some improvement on his echocardiogram of 5/1.  8.  Stage IIIb chronic kidney disease  9.  Paroxysmal atrial fibrillation  10.  Acute hypoxic respiratory failure- improved   11.  Ischemic hepatitis- improved  12.  Pyuria- with a negative urine culture.  13.  Anoxic encephalopathy- improved  14.  Right fifth metatarsal wound/bullous lesion    PLAN/RECOMMENDATIONS:   1.  Continue with a right leg Unna boot  2.  Continue off of antibiotic therapy  3.  Diuretic and vasopressor therapy per  intensivist service.    His overall prognosis is extremely poor due to his severe underlying systolic congestive heart failure.   He may be transitioning to hospice.  I discussed his complex situation with Dr. Angeles again today.      Lincoln Padilla MD  5/3/2024  06:59 EDT

## 2024-05-03 NOTE — PROGRESS NOTES
Intensive Care Follow-up     Hospital:  LOS: 10 days   Mr. Jermaine Singh, 79 y.o. male is followed for:   Cardiac arrest            History of present illness:    80yo M with a history of T2DM, Stage 3 CKD, bilateral DVTs, paroxysmal Afib, SSS s/p post-pacemaker placement, CHF, PVD, and left diabetic foot infection who presented to [facility] on 4/22/24 with weakness and a chronic wound on his RLE. UA was consistent with a UTI. He was started on Rocephin and admitted to Hospital Medicine.      On 4/25/24, he suffered a Vfib arrest and was intubated during CPR. He was transferred to the ICU. He was able to be extubated on 4/26/24. After extubation, his respiratory status was tenuous and family decided to make him a DNR/DNI.  Patient was seen by infectious disease and on antibiotics.  Also has been requiring norepinephrine at low-dose.  Midodrine has been started. Patient also developed shock liver which seems to be recovering.    Repeat echocardiogram showed ejection fraction remains at 21 to 25%.  Patient also  remains hemodynamically unstable and unable to be weaned off norepinephrine.    Subjective   Interval History:  No acute events overnight.  Patient seems to be doing fair.  Remains on norepinephrine at 0.03 mics which we are attempting to wean off.  Repeat echocardiogram showed ejection fraction 21 to 25%.  Denies any chest pain or shortness of breath currently.  Oral intake remains poor.             The patient's past medical, surgical and social history were reviewed and updated in Epic as appropriate.       Objective     Infusions:  norepinephrine, 0.02-0.3 mcg/kg/min, Last Rate: 0.015 mcg/kg/min (05/03/24 6686)      Medications:  apixaban, 2.5 mg, Oral, Q12H  aspirin, 81 mg, Oral, Daily  atorvastatin, 20 mg, Oral, Nightly  castor oil-balsam peru, 1 Application, Topical, Daily  insulin glargine, 8 Units, Subcutaneous, Nightly  Insulin Lispro, 2-9 Units, Subcutaneous, 4x Daily AC & at Bedtime  lactobacillus  "acidophilus, 1 capsule, Oral, Daily  levothyroxine, 88 mcg, Oral, Q AM  midodrine, 10 mg, Oral, TID AC  Naloxegol Oxalate, 25 mg, Oral, QAM  pantoprazole, 40 mg, Oral, Q AM  polyethylene glycol, 17 g, Oral, Daily  senna-docusate sodium, 2 tablet, Oral, BID  sodium chloride, 10 mL, Intravenous, Q12H  tamsulosin, 0.4 mg, Oral, Daily        Vital Sign Min/Max for last 24 hours  Temp  Min: 97.9 °F (36.6 °C)  Max: 99.5 °F (37.5 °C)   BP  Min: 80/66  Max: 120/73   Pulse  Min: 59  Max: 90   Resp  Min: 14  Max: 18   SpO2  Min: 92 %  Max: 99 %   No data recorded       Input/Output for last 24 hour shift  05/02 0701 - 05/03 0700  In: 370.7 [P.O.:240; I.V.:130.7]  Out: 1975 [Urine:1975]      Objective:  Vital signs: (most recent): Blood pressure 100/67, pulse 67, temperature 97.9 °F (36.6 °C), temperature source Oral, resp. rate 18, height 190 cm (74.8\"), weight 90.4 kg (199 lb 4.7 oz), SpO2 94%.            General Appearance: Sleeping but wakes up, no acute distress  Lungs:   B/L Breath sounds present with decreased breath sounds on bases, no wheezing heard, no crackles.   Heart: S1 and S2 present, no murmur  Abdomen: Soft, nontender, no guarding or rigidity, bowel sounds positive.  Extremities:   Trace edema, warm to touch.  Right lower extremity dressing intact  Neurologic:  Moving all four extremities. Good strength bilaterally.      Results from last 7 days   Lab Units 05/03/24  0441 05/02/24  0433 05/01/24  0339   WBC 10*3/mm3 10.37 10.65 8.92   HEMOGLOBIN g/dL 8.9* 9.0* 9.0*   PLATELETS 10*3/mm3 251 252 263     Results from last 7 days   Lab Units 05/03/24  0441 05/02/24  0433 05/01/24  0339 04/30/24  0447 04/28/24  0405 04/27/24  0607   SODIUM mmol/L 140 136 143 140   < > 142   POTASSIUM mmol/L 4.2 4.4 4.5 4.3   < > 4.7   CO2 mmol/L 18.0* 17.0* 18.0* 17.0*   < > 18.0*   BUN mg/dL 79* 82* 84* 82*   < > 82*   CREATININE mg/dL 3.38* 3.30* 3.39* 2.68*   < > 2.37*   MAGNESIUM mg/dL  --  2.8*  --  2.8*  --  2.9*   PHOSPHORUS " mg/dL  --  4.9*  --  4.5  --   --    GLUCOSE mg/dL 110* 165* 94 162*   < > 142*    < > = values in this interval not displayed.     Estimated Creatinine Clearance: 22.7 mL/min (A) (by C-G formula based on SCr of 3.38 mg/dL (H)).            Images:   None new    I reviewed the patient's results and images.     Assessment & Plan   Impression        Cardiac arrest    Type 2 diabetes mellitus with hyperglycemia    Hyperlipidemia LDL goal <70    Hypothyroidism (acquired)    Coronary artery disease involving native coronary artery of native heart with angina pectoris    Pacemaker    HFrEF (heart failure with reduced ejection fraction)    Anemia, chronic disease    Stage 3b chronic kidney disease    UTI (urinary tract infection)    Acute respiratory failure with hypoxia    Elevated troponin level not due myocardial infarction       Plan        1.  Patient s/p cardiac arrest complicated by shock liver and acute on chronic renal failure.  Patient seems to be recovering electrolyte and transaminitis abnormalities..  Has severe cardiomyopathy with ejection fraction less than 20%.  Cardiology team is following.  Continue aspirin, statin.  Repeat echocardiogram  still shows a significantly reduced ejection fraction of 21 to 25%..  2.  Hemodynamically unstable still requiring norepinephrine.  Remains on high-dose midodrine as well.  Will attempt to wean pressors as tolerated.  Cardiology team has had multiple discussions with family.  Patient is not candidate for any left ventricular assist device.  We could consider palliative inotrope therapy at home.  Palliative care team has been consulted for goals of care discussions.  3.  Finished antibiotic course.  Observe off antibiotics.  No new fevers or leukocytosis noted.  4.  Oral intake remains poor.  Depending on goals of care discussion we may need to have discussion regarding tube feeds as well.  5.  Continue current insulin regimen for hyperglycemia management.  Will make  further adjustments as needed  6.  Continue Eliquis  7.  GI prophylaxis.  8.  Out of bed and mobilize.  9.  Patient was given diuretics yesterday with good response.  However creatinine slightly worse.  BUN stable.  Nephrology team is managing.  Patient not candidate for renal replacement therapy due to severe cardiomyopathy and hemodynamic instability.    Continue close monitoring in ICU as remains at risk of decline.  Family has been updated throughout.    Plan of care and goals reviewed with multidisciplinary/antibiotic stewardship team during rounds.   I discussed the patient's findings and my recommendations with patient, family, and nursing staff     Time spent Critical care 30 min (exclusive of procedure time)  including high complexity decision making to assess, manipulate, and support vital organ system failure in this individual who has impairment of one or more vital organ systems such that there is a high probability of imminent or life threatening deterioration in the patient’s condition.      Antonio Angeles MD, [facility]  Pulmonary, Critical care and Sleep Medicine

## 2024-05-03 NOTE — PROGRESS NOTES
" LOS: 10 days   Patient Care Team:  Marysol Shrestha MD as PCP - General (Internal Medicine)  Cooper Apodaca MD as Consulting Physician (Cardiology)    Chief Complaint: [place] on CKD stage III  Cardiorenal syndrome.     Subjective     Good response to diuretics. UOP ~ 1.9 liter. UOP ~ 575. Stable renal function. Otherwise clinical status is same.    Subjective:  Symptoms:  Stable.  No shortness of breath, chest pain or chest pressure.    Diet:  Adequate intake.  No nausea or vomiting.        History taken from: patient    Objective     Vital Sign Min/Max for last 24 hours  Temp  Min: 97.9 °F (36.6 °C)  Max: 99.1 °F (37.3 °C)   BP  Min: 80/66  Max: 120/73   Pulse  Min: 59  Max: 90   Resp  Min: 14  Max: 18   SpO2  Min: 92 %  Max: 99 %   No data recorded   Weight  Min: 90.4 kg (199 lb 4.7 oz)  Max: 90.4 kg (199 lb 4.7 oz)     Flowsheet Rows      Flowsheet Row First Filed Value   Admission Height 190.5 cm (75\") Documented at 04/22/2024 1438   Admission Weight 77.1 kg (170 lb) Documented at 04/22/2024 1438            I/O this shift:  In: -   Out: 575 [Urine:575]  I/O last 3 completed shifts:  In: 446.7 [P.O.:240; I.V.:206.7]  Out: 2265 [Urine:2265]    Objective:  General Appearance:  Comfortable.    Vital signs: (most recent): Blood pressure 107/71, pulse 71, temperature 98.1 °F (36.7 °C), temperature source Bladder, resp. rate 16, height 190 cm (74.8\"), weight 90.4 kg (199 lb 4.7 oz), SpO2 98%.  Vital signs are normal.  No fever.    Output: Producing urine.    HEENT: Normal HEENT exam.    Lungs:  Normal effort and normal respiratory rate.  Breath sounds clear to auscultation.  He is not in respiratory distress.  No decreased breath sounds or wheezes.    Heart: Normal rate.  Regular rhythm.  S1 normal and S2 normal.  No gallop.   Abdomen: Abdomen is soft.  Bowel sounds are normal.     Extremities: There is no dependent edema or local swelling.    Pulses: Distal pulses are intact.    Neurological: Patient is alert and " "oriented to person, place and time.  Normal strength.    Pupils:  Pupils are equal, round, and reactive to light.    Skin:  Pale.                Results Review:     I reviewed the patient's new clinical results.    WBC WBC   Date Value Ref Range Status   05/03/2024 10.37 3.40 - 10.80 10*3/mm3 Final   05/02/2024 10.65 3.40 - 10.80 10*3/mm3 Final   05/01/2024 8.92 3.40 - 10.80 10*3/mm3 Final      HGB Hemoglobin   Date Value Ref Range Status   05/03/2024 8.9 (L) 13.0 - 17.7 g/dL Final   05/02/2024 9.0 (L) 13.0 - 17.7 g/dL Final   05/01/2024 9.0 (L) 13.0 - 17.7 g/dL Final      HCT Hematocrit   Date Value Ref Range Status   05/03/2024 27.3 (L) 37.5 - 51.0 % Final   05/02/2024 26.8 (L) 37.5 - 51.0 % Final   05/01/2024 27.0 (L) 37.5 - 51.0 % Final      Platlets No results found for: \"LABPLAT\"   MCV MCV   Date Value Ref Range Status   05/03/2024 90.7 79.0 - 97.0 fL Final   05/02/2024 88.4 79.0 - 97.0 fL Final   05/01/2024 90.3 79.0 - 97.0 fL Final          Sodium Sodium   Date Value Ref Range Status   05/03/2024 140 136 - 145 mmol/L Final   05/02/2024 136 136 - 145 mmol/L Final   05/01/2024 143 136 - 145 mmol/L Final      Potassium Potassium   Date Value Ref Range Status   05/03/2024 4.2 3.5 - 5.2 mmol/L Final   05/02/2024 4.4 3.5 - 5.2 mmol/L Final   05/01/2024 4.5 3.5 - 5.2 mmol/L Final      Chloride Chloride   Date Value Ref Range Status   05/03/2024 109 (H) 98 - 107 mmol/L Final   05/02/2024 107 98 - 107 mmol/L Final   05/01/2024 112 (H) 98 - 107 mmol/L Final      CO2 CO2   Date Value Ref Range Status   05/03/2024 18.0 (L) 22.0 - 29.0 mmol/L Final   05/02/2024 17.0 (L) 22.0 - 29.0 mmol/L Final   05/01/2024 18.0 (L) 22.0 - 29.0 mmol/L Final      BUN BUN   Date Value Ref Range Status   05/03/2024 79 (H) 8 - 23 mg/dL Final   05/02/2024 82 (H) 8 - 23 mg/dL Final   05/01/2024 84 (H) 8 - 23 mg/dL Final      Creatinine Creatinine   Date Value Ref Range Status   05/03/2024 3.38 (H) 0.76 - 1.27 mg/dL Final   05/02/2024 3.30 (H) " "0.76 - 1.27 mg/dL Final   05/01/2024 3.39 (H) 0.76 - 1.27 mg/dL Final      Calcium Calcium   Date Value Ref Range Status   05/03/2024 7.9 (L) 8.6 - 10.5 mg/dL Final   05/02/2024 7.8 (L) 8.6 - 10.5 mg/dL Final   05/01/2024 7.6 (L) 8.6 - 10.5 mg/dL Final      PO4 No results found for: \"CAPO4\"   Albumin Albumin   Date Value Ref Range Status   05/03/2024 2.8 (L) 3.5 - 5.2 g/dL Final   05/02/2024 2.8 (L) 3.5 - 5.2 g/dL Final   05/01/2024 2.7 (L) 3.5 - 5.2 g/dL Final      Magnesium Magnesium   Date Value Ref Range Status   05/02/2024 2.8 (H) 1.6 - 2.4 mg/dL Final      Uric Acid No results found for: \"URICACID\"     Medication Review: Yes    Assessment & Plan       Cardiac arrest    Type 2 diabetes mellitus with hyperglycemia    Hyperlipidemia LDL goal <70    Hypothyroidism (acquired)    Coronary artery disease involving native coronary artery of native heart with angina pectoris    Pacemaker    HFrEF (heart failure with reduced ejection fraction)    Anemia, chronic disease    Stage 3b chronic kidney disease    UTI (urinary tract infection)    Acute respiratory failure with hypoxia    Elevated troponin level not due myocardial infarction      Assessment & Plan      [place] on CKD stage IIIb/IV:  Baseline cr 1.8-2.2 mg/dl. Recent cr trend 2.2>2.37>2.5>2.63>3.3. UOP in last 24hr ~455. Etiology of [place] likely hemodynamic injury post vfib arrest vs decompensated cardiorenal syndrome. Taken off Zosyn due to concern for AIN. Urine eos 0%        CKD stage III/IV: GFR baseline 28-32ml/min. Follows with Dr Pal. Risk factor for CKD: DM, HTN, PVD and CAD. Serological workup negative in the past. Including ANCA, antiGBM     Met acidosis: Due to [place] on CKD     Cardiogenic shock: Requiring pressor support. ECHO post arrest showed EF 20%. Hx of severe LV disease. Cardiology following. Repeat ECHO shows EF 21-25%. Requiring pressor support to optimize blood pressure.    Volume status: On RA. Does have dependent edema LE b/l     Hx of recurrent " UTI: Recently treated for fungal UTI. Urology and ID following      Hx of HTN: Renal artery duplex negative for WILMA in the past.      Recs  [place] on CKD stage IIIb/IV due to above mentioned risk factor. Repeat ECHO showed EF 21-25%. Given severe LAD disease and low EF patient is not a good candidate for long term dialysis. I discussed with patient continued medical management and optimization of blood pressure and volume status for now.   - Continue lasix 80mg IV daily.   - Unable to add GDMT for CHF. Not a candidate for ACEi/ARB/SGLT-2inh  - Target MAP 65 or above  - Strict I/o  - Dose meds to eGFR            I discussed the patients findings and my recommendations with patient       Blake Tilley MD  05/03/24  14:28 EDT    Time:

## 2024-05-03 NOTE — THERAPY TREATMENT NOTE
Patient Name: Jermaine Singh  : 1945    MRN: [id number]                              Today's Date: 5/3/2024       Admit Date: 2024    Visit Dx:     ICD-10-CM ICD-9-CM   1. Acute UTI  N39.0 599.0   2. Generalized weakness  R53.1 780.79   3. Edema of right lower extremity  R60.0 782.3   4. Leg erythema  L53.9 695.9   5. Chronic kidney disease, unspecified CKD stage  N18.9 585.9   6. Elevated troponin  R79.89 790.6   7. Hyperkalemia  E87.5 276.7   8. Cardiac arrest  I46.9 427.5   9. Oropharyngeal dysphagia  R13.12 787.22     Patient Active Problem List   Diagnosis    Type 2 diabetes mellitus with hyperglycemia    Hyperlipidemia LDL goal <70    Hypothyroidism (acquired)    Vitamin D deficiency on Rx     Diabetic neuropathy    Coronary artery disease involving native coronary artery of native heart with angina pectoris    Atherosclerosis of native artery of both lower extremities with intermittent claudication    Pacemaker    HFrEF (heart failure with reduced ejection fraction)    DVT, bilateral lower limbs    Cellulitis of right lower extremity    Anemia, chronic disease    PVD (peripheral vascular disease)    Diabetic foot ulcer    GERD without esophagitis    Stage 3b chronic kidney disease    Right inguinal hernia    PAF (paroxysmal atrial fibrillation)    Moderate malnutrition    UTI (urinary tract infection)    Cardiac arrest    Acute respiratory failure with hypoxia    History of DVT (deep vein thrombosis)    Elevated troponin level not due myocardial infarction     Past Medical History:   Diagnosis Date    Allergic     Arthritis     Asthma     Coronary artery disease     Diabetes mellitus 2000    started on inuslin 2018; started on po meds in ; checking blood sugars daily     Diabetic foot infection 2023    Disease of thyroid gland     po meds daily for hypothyroidism     Elevated serum creatinine 11/15/2022    Elevated troponin 10/02/2022    Generalized weakness 11/15/2022     History of fracture as a child     rt leg- severe     Hyperlipidemia     Hypertension     Hypothyroidism     PAF (paroxysmal atrial fibrillation) 07/23/2023    Peripheral neuropathy     Peripheral vascular disease     s/p angiogram 2/19-needs stent in left leg     Pleural effusion, bilateral 11/15/2022    Proximal phalanx fracture of the second digit extending into the second metatarsal joint 07/28/2022    RBBB     Right knee pain     Status post amputation of great toe and second toe of left foot 07/23/2023    Status post amputation of great toe, left 11/15/2022    Unstable angina 02/12/2019    Added automatically from request for surgery [id number]    Vitamin D deficiency      Past Surgical History:   Procedure Laterality Date    AMPUTATION DIGIT Left 01/17/2023    Procedure: AMPUTATION DIGIT LEFT;  Surgeon: Gopal Márquez MD;  Location:  [facility] OR;  Service: Vascular;  Laterality: Left;    APPENDECTOMY      CARDIAC CATHETERIZATION N/A 02/14/2019    Procedure: Left Heart Cath;  Surgeon: Cooper Apodaca MD;  Location: [facility] CATH INVASIVE LOCATION;  Service: Cardiology    CARDIAC ELECTROPHYSIOLOGY PROCEDURE N/A 05/18/2022    Procedure: DEVICE IMPLANT;  Surgeon: Cooper Apodaca MD;  Location: [facility] CATH INVASIVE LOCATION;  Service: Cardiology;  Laterality: N/A;    CARDIAC SURGERY      COLONOSCOPY      CORONARY ARTERY BYPASS GRAFT N/A 03/22/2019    Procedure: MEDIAN STERNOTOMY, CORONARY ARTERY BYPASS GRAFT X3, UTILIZING THE LEFT INTERNAL MAMMARY ARTERY, EVH AND OPEN HARVEST OF THE RIGHT GREATER SAPHENOUS VEIN, EXPLORATION OF THE LEFT LEG;  Surgeon: Ap Au MD;  Location: [facility] OR;  Service: Cardiothoracic    EYE SURGERY Bilateral     cataracts     FRACTURE SURGERY      INTERVENTIONAL RADIOLOGY PROCEDURE N/A 07/29/2021    Procedure: Abdominal Aortagram with Runoff;  Surgeon: Jermaine Hernandez MD;  Location: [facility] CATH INVASIVE LOCATION;  Service: Cardiovascular;  Laterality: N/A;    KNEE  ARTHROSCOPY      right x 2, left x 1    LACERATION REPAIR      right leg    LEG SURGERY      2 for fracture of rt leg     TONSILLECTOMY      Adnoidectomy    TRANS METATARSAL AMPUTATION Left 08/02/2022    Procedure: GREAT TOE AMPUTATION LEFT;  Surgeon: Gopal Márquez MD;  Location: [facility];  Service: Vascular;  Laterality: Left;      General Information       Row Name 05/03/24 1541          OT Time and Intention    Document Type therapy note (daily note)  -     Mode of Treatment occupational therapy  -       Row Name 05/03/24 1541          General Information    Patient Profile Reviewed yes  -     Existing Precautions/Restrictions fall;cardiac;oxygen therapy device and L/min  -     Barriers to Rehab medically complex  -       Row Name 05/03/24 1541          Cognition    Orientation Status (Cognition) oriented x 3  -       Row Name 05/03/24 1541          Safety Issues, Functional Mobility    Safety Issues Affecting Function (Mobility) insight into deficits/self-awareness;judgment;problem-solving;safety precaution awareness;safety precautions follow-through/compliance  -     Impairments Affecting Function (Mobility) balance;endurance/activity tolerance;strength;postural/trunk control;pain;motor control;motor planning;shortness of breath  -               User Key  (r) = Recorded By, (t) = Taken By, (c) = Cosigned By      Initials Name Provider Type     Naye Salinas OT Occupational Therapist                   Lymphedema       Row Name 05/03/24 110             Lymphedema Edema Assessment    Ptting Edema Category By severity  -KW      Pitting Edema Mild  -KW      Recorded by [KW] Henna Rios, PT              Skin Changes/Observations    Location/Assessment Lower Extremity  -KW      Lower Extremity Conditions right:;inflamed  -KW      Lower Extremity Color/Pigment right:;erythema  -KW      Recorded by [KW] Henna Rios, PT              Lymphedema Pulses/Capillary Refill    Lymphedema  Pulses/Capillary Refill capillary refill  -KW      Capillary Refill lower extremity capillary refill  -KW      Lower Extremity Capillary Refill right:;left:;less than 3 seconds  -KW      Recorded by [KW] Henna Rios, SETH              Compression/Skin Care    Compression/Skin Care skin care;wrapping location;bandaging  -KW      Skin Care washed/dried;lotion applied  -KW      Wrapping Location lower extremity  -KW      Wrapping Location LE bilateral:;foot to knee  -KW      Wrapping Comments unna boots in clamshell pattern secured with coban and spandage  -KW      Recorded by [KW] Henna Rios PT                User Key  (r) = Recorded By, (t) = Taken By, (c) = Cosigned By      Initials Name Effective Dates    Henna Segovia, SETH 01/27/22 -                    Mobility/ADL's       Row Name 05/03/24 1543          Bed Mobility    Supine-Sit [place] (Bed Mobility) moderate assist (50% patient effort);2 person assist;verbal cues;nonverbal cues (demo/gesture)  -     Assistive Device (Bed Mobility) head of bed elevated;draw sheet;bed rails  -       Row Name 05/03/24 1543          Bed-Chair Transfer    Bed-Chair [place] (Transfers) 2 person assist;verbal cues;moderate assist (50% patient effort)  -     Assistive Device (Bed-Chair Transfers) other (see comments)  BUE support  -       Row Name 05/03/24 1543          Sit-Stand Transfer    Sit-Stand [place] (Transfers) moderate assist (50% patient effort);2 person assist;verbal cues;nonverbal cues (demo/gesture)  -     Assistive Device (Sit-Stand Transfers) walker, front-wheeled  -       Row Name 05/03/24 1543          Stand-Sit Transfer    Stand-Sit [place] (Transfers) 2 person assist;verbal cues;moderate assist (50% patient effort)  -     Assistive Device (Stand-Sit Transfers) walker, front-wheeled  -       Row Name 05/03/24 1543          Activities of Daily Living    BADL Assessment/Intervention toileting  -        Row Name 05/03/24 1543          Toileting Assessment/Training    [place] (Toileting) perform perineal hygiene;dependent (less than 25% patient effort)  -     Position (Toileting) supported standing  -               User Key  (r) = Recorded By, (t) = Taken By, (c) = Cosigned By      Initials Name Provider Type    Naye Davila OT Occupational Therapist                   Obj/Interventions       Row Name 05/03/24 1544          Balance    Static Sitting Balance minimal assist  Advanced to CGA  -KL     Dynamic Sitting Balance moderate assist  -KL     Position, Sitting Balance supported;sitting edge of bed  -KL     Static Standing Balance moderate assist;2-person assist  -KL     Dynamic Standing Balance moderate assist;2-person assist  -KL     Position/Device Used, Standing Balance supported;walker, front-wheeled  -     Balance Interventions sitting;standing;sit to stand;supported;static;dynamic;occupation based/functional task  -               User Key  (r) = Recorded By, (t) = Taken By, (c) = Cosigned By      Initials Name Provider Type    Naye Davila OT Occupational Therapist                   Goals/Plan    No documentation.                  Clinical Impression       Row Name 05/03/24 1545          Pain Assessment    Pain Intervention(s) Repositioned;Ambulation/increased activity  -       Row Name 05/03/24 1545          Pain Scale: FACES Pre/Post-Treatment    Pain: FACES Scale, Pretreatment 4-->hurts little more  -KL     Posttreatment Pain Rating 4-->hurts little more  -KL     Pain Location upper  -KL     Pain Location - back  -KL       Row Name 05/03/24 1545          Plan of Care Review    Plan of Care Reviewed With patient;significant other  -KL     Progress improving  -KL     Outcome Evaluation Pt demo improvement in activity tolerance and mobility during tx session. Session continues to be limited by generalized weakness and dizziness. Will continue OT POC to progress towards goals. d/c  rec is IPR.  -       Row Name 05/03/24 1545          Therapy Plan Review/Discharge Plan (OT)    Anticipated Discharge Disposition (OT) inpatient rehabilitation facility  -       Row Name 05/03/24 1545          Vital Signs    Pre Systolic BP Rehab 90  -KL     Pre Treatment Diastolic BP 67  -KL     Post Systolic BP Rehab 83  -KL     Post Treatment Diastolic BP 52  -KL     Pretreatment Heart Rate (beats/min) 71  -KL     Posttreatment Heart Rate (beats/min) 71  -KL     Pre SpO2 (%) 97  -KL     O2 Delivery Pre Treatment room air  -KL     Post SpO2 (%) 98  -KL     O2 Delivery Post Treatment room air  -KL     Pre Patient Position Supine  -     Intra Patient Position Standing  -KL     Post Patient Position Sitting  -       Row Name 05/03/24 1545          Positioning and Restraints    Pre-Treatment Position in bed  -KL     Post Treatment Position chair  -     In Chair notified nsg;reclined;sitting;call light within reach;encouraged to call for assist;exit alarm on;with family/caregiver;waffle cushion;legs elevated;on mechanical lift sling  -               User Key  (r) = Recorded By, (t) = Taken By, (c) = Cosigned By      Initials Name Provider Type    Naye Davila, ANG Occupational Therapist                   Outcome Measures       Row Name 05/03/24 1547          How much help from another is currently needed...    Putting on and taking off regular lower body clothing? 2  -KL     Bathing (including washing, rinsing, and drying) 2  -KL     Toileting (which includes using toilet bed pan or urinal) 1  -KL     Putting on and taking off regular upper body clothing 2  -KL     Taking care of personal grooming (such as brushing teeth) 2  -KL     Eating meals 3  -KL     AM-PAC 6 Clicks Score (OT) 12  -       Row Name 05/03/24 1547          Functional Assessment    Outcome Measure Options AM-PAC 6 Clicks Daily Activity (OT)  -               User Key  (r) = Recorded By, (t) = Taken By, (c) = Cosigned By      Initials  Name Provider Type    Naye Davila OT Occupational Therapist                    Occupational Therapy Education       Title: PT OT SLP Therapies (In Progress)       Topic: Occupational Therapy (In Progress)       Point: ADL training (In Progress)       Description:   Instruct learner(s) on proper safety adaptation and remediation techniques during self care or transfers.   Instruct in proper use of assistive devices.                  Learning Progress Summary             Patient Acceptance, E, NR by  at 5/3/2024 1547    Acceptance, E, NR by  at 4/30/2024 1006    Acceptance, E, VU by  at 4/25/2024 1314    Acceptance, E, VU by AN at 4/23/2024 1344                         Point: Home exercise program (Done)       Description:   Instruct learner(s) on appropriate technique for monitoring, assisting and/or progressing therapeutic exercises/activities.                  Learning Progress Summary             Patient Acceptance, E, VU by  at 4/25/2024 1314    Acceptance, E, VU by AN at 4/23/2024 1344                         Point: Precautions (In Progress)       Description:   Instruct learner(s) on prescribed precautions during self-care and functional transfers.                  Learning Progress Summary             Patient Acceptance, E, NR by  at 5/3/2024 1547    Acceptance, E, NR by  at 5/1/2024 1542    Acceptance, E, NR by  at 4/30/2024 1006    Acceptance, E, NR by  at 4/29/2024 1131    Acceptance, E, VU by  at 4/25/2024 1314    Acceptance, E, VU by AN at 4/23/2024 1344   Significant Other Acceptance, E, NR by  at 4/29/2024 1131                         Point: Body mechanics (In Progress)       Description:   Instruct learner(s) on proper positioning and spine alignment during self-care, functional mobility activities and/or exercises.                  Learning Progress Summary             Patient Acceptance, E, NR by  at 5/3/2024 1547    Acceptance, E, NR by  at 5/1/2024 1542    Acceptance, E,  NR by  at 4/30/2024 1006    Acceptance, E, NR by  at 4/29/2024 1131    Acceptance, E, VU by MR at 4/25/2024 1314    Acceptance, E, VU by AN at 4/23/2024 1344   Significant Other Acceptance, E, NR by  at 4/29/2024 1131                                         User Key       Initials Effective Dates Name Provider Type Discipline     10/14/22 -  Stacy Sevilla, OT Occupational Therapist OT    AN 09/21/21 -  Heide Gin, OT Occupational Therapist OT    MR 09/22/22 -  Rosalinda Silva OT Occupational Therapist OT     02/05/24 -  Naye Salinas OT Occupational Therapist OT                  OT Recommendation and Plan  Planned Therapy Interventions (OT): activity tolerance training, adaptive equipment training, functional balance retraining, occupation/activity based interventions, patient/caregiver education/training, ROM/therapeutic exercise, transfer/mobility retraining, strengthening exercise  Therapy Frequency (OT): daily  Plan of Care Review  Plan of Care Reviewed With: patient, significant other  Progress: improving  Outcome Evaluation: Pt demo improvement in activity tolerance and mobility during tx session. Session continues to be limited by generalized weakness and dizziness. Will continue OT POC to progress towards goals. d/c rec is IPR.     Time Calculation:         Time Calculation- OT       Row Name 05/03/24 1547             Time Calculation- OT    OT Start Time 1425  -KL      OT Received On 05/03/24  -KL         Timed Charges    96024 - OT Therapeutic Activity Minutes 15  -KL      38643 - OT Self Care/Mgmt Minutes 15  -KL         Total Minutes    Timed Charges Total Minutes 30  -KL       Total Minutes 30  -KL                User Key  (r) = Recorded By, (t) = Taken By, (c) = Cosigned By      Initials Name Provider Type    Naye Davila OT Occupational Therapist                  Therapy Charges for Today       Code Description Service Date Service Provider Modifiers Qty    13728591765 HC OT  THERAPEUTIC ACT EA 15 MIN 5/3/2024 Naye Salinas OT GO 1    06989746359 HC OT SELF CARE/MGMT/TRAIN EA 15 MIN 5/3/2024 Naye Salinas OT GO 1                 Naye Salinas OT  5/3/2024

## 2024-05-03 NOTE — CASE MANAGEMENT/SOCIAL WORK
Continued Stay Note  [facility]     Patient Name: Jermaine Singh  MRN: [id number]  Today's Date: 5/3/2024    Admit Date: 4/22/2024    Plan: ongoing   Discharge Plan       Row Name 05/03/24 1423       Plan    Plan ongoing    Patient/Family in Agreement with Plan yes    Plan Comments Discussed in MDR, daughter agreeable to Palliative consult, per Palliative note, will have hospice see. Remains on [place]. I did fax information to [facility] on Tuesday. D/C plan TBD @ this time. Original plan was to go to [facility], and they have continued to follow. Will await recs from Palliative, hospice, and see what family would like post GOC discussion. CM will continue to follow.    Final Discharge Disposition Code 30 - still a patient                   Discharge Codes    No documentation.                 Expected Discharge Date and Time       Expected Discharge Date Expected Discharge Time    May 7, 2024               Reilly Davis RN

## 2024-05-03 NOTE — CONSULTS
Palliative Care Initial Consult   Attending Physician: Eyad Ledesma MD  Referring Provider: Megan WINCHESTER      Reason for Referral:  assistance with clarification of goals of care    Code Status:   Code Status and Medical Interventions:   Ordered at: 04/26/24 1353     Medical Intervention Limits:    NO intubation (DNI)    NO cardioversion     Level Of Support Discussed With:    Next of Kin (If No Surrogate)    Health Care Surrogate     Code Status (Patient has no pulse and is not breathing):    No CPR (Do Not Attempt to Resuscitate)     Medical Interventions (Patient has pulse or is breathing):    Limited Support      Advanced Directives: Advance Directive Status: Patient has advance directive, copy requested   Family/Support: spouse and dtr     Goals of Care:    Goals of Care/Treatment Preferences:    Treat as able       HPI:   80 yo male admitted with generalized and right fount blister.  Experience vi fib arrest on 4/25.  Required 15 shocks prior to ROSC.  Has remained in ICU but is awake alert and oriented.  Reports understanding his heart is very weak and not liekly to improve.  There is additionally documentation of consideration for home inotrope tx.  Has been working with therapy.  Has been seen by ID, cards,  and renal.    ROS:   SOA with any activity.  Pain from rib fx with movement.  Otherwise denies pain.  Last BM 5/2.  -N/V    Past Medical History:   Diagnosis Date    Allergic     Arthritis     Asthma     Coronary artery disease     Diabetes mellitus 2000    started on inuslin 12/2018; started on po meds in 2000; checking blood sugars daily     Diabetic foot infection 07/23/2023    Disease of thyroid gland     po meds daily for hypothyroidism     Elevated serum creatinine 11/15/2022    Elevated troponin 10/02/2022    Generalized weakness 11/15/2022    History of fracture as a child     rt leg- severe     Hyperlipidemia     Hypertension     Hypothyroidism     PAF (paroxysmal atrial  fibrillation) 07/23/2023    Peripheral neuropathy     Peripheral vascular disease     s/p angiogram 2/19-needs stent in left leg     Pleural effusion, bilateral 11/15/2022    Proximal phalanx fracture of the second digit extending into the second metatarsal joint 07/28/2022    RBBB     Right knee pain     Status post amputation of great toe and second toe of left foot 07/23/2023    Status post amputation of great toe, left 11/15/2022    Unstable angina 02/12/2019    Added automatically from request for surgery [id number]    Vitamin D deficiency      Past Surgical History:   Procedure Laterality Date    AMPUTATION DIGIT Left 01/17/2023    Procedure: AMPUTATION DIGIT LEFT;  Surgeon: Gopal Márquez MD;  Location:  [facility];  Service: Vascular;  Laterality: Left;    APPENDECTOMY      CARDIAC CATHETERIZATION N/A 02/14/2019    Procedure: Left Heart Cath;  Surgeon: Cooper Apodaca MD;  Location: [facility] CATH INVASIVE LOCATION;  Service: Cardiology    CARDIAC ELECTROPHYSIOLOGY PROCEDURE N/A 05/18/2022    Procedure: DEVICE IMPLANT;  Surgeon: Cooper Apodaca MD;  Location: [facility] CATH INVASIVE LOCATION;  Service: Cardiology;  Laterality: N/A;    CARDIAC SURGERY      COLONOSCOPY      CORONARY ARTERY BYPASS GRAFT N/A 03/22/2019    Procedure: MEDIAN STERNOTOMY, CORONARY ARTERY BYPASS GRAFT X3, UTILIZING THE LEFT INTERNAL MAMMARY ARTERY, EVH AND OPEN HARVEST OF THE RIGHT GREATER SAPHENOUS VEIN, EXPLORATION OF THE LEFT LEG;  Surgeon: Ap Au MD;  Location: [facility];  Service: Cardiothoracic    EYE SURGERY Bilateral     cataracts     FRACTURE SURGERY      INTERVENTIONAL RADIOLOGY PROCEDURE N/A 07/29/2021    Procedure: Abdominal Aortagram with Runoff;  Surgeon: Jermaine Hernandez MD;  Location: [facility] CATH INVASIVE LOCATION;  Service: Cardiovascular;  Laterality: N/A;    KNEE ARTHROSCOPY      right x 2, left x 1    LACERATION REPAIR      right leg    LEG SURGERY      2 for fracture of rt leg     TONSILLECTOMY       Adnoidectomy    TRANS METATARSAL AMPUTATION Left 08/02/2022    Procedure: GREAT TOE AMPUTATION LEFT;  Surgeon: Gopal Márquez MD;  Location: [facility];  Service: Vascular;  Laterality: Left;     Social History     Socioeconomic History    Marital status:     Number of children: 2   Tobacco Use    Smoking status: Never     Passive exposure: Past    Smokeless tobacco: Never   Vaping Use    Vaping status: Never Used   Substance and Sexual Activity    Alcohol use: Not Currently     Comment: every 2-3 months    Drug use: No    Sexual activity: Not Currently     Partners: Female     Family History   Problem Relation Age of Onset    Coronary artery disease Mother     Diabetes Mother     Hyperlipidemia Mother     Cancer Father        Allergies   Allergen Reactions    Vancomycin Other (See Comments)     Kidney failure per last admission         Current Facility-Administered Medications:     acetaminophen (TYLENOL) tablet 650 mg, 650 mg, Oral, Q6H PRN, Maricel Peraza, APRN, 650 mg at 05/03/24 0052    albuterol (PROVENTIL) nebulizer solution 0.083% 2.5 mg/3mL, 2.5 mg, Nebulization, BID PRN, Nathalia Mckeon DNP, APRN, 2.5 mg at 04/30/24 0755    apixaban (ELIQUIS) tablet 2.5 mg, 2.5 mg, Oral, Q12H, Antonio Angeles MD, 2.5 mg at 05/03/24 0847    aspirin chewable tablet 81 mg, 81 mg, Oral, Daily, Maricel Peraza, APRN, 81 mg at 05/03/24 0847    atorvastatin (LIPITOR) tablet 20 mg, 20 mg, Oral, Nightly, Golden Madison MD, 20 mg at 05/02/24 2108    sennosides-docusate (PERICOLACE) 8.6-50 MG per tablet 2 tablet, 2 tablet, Oral, BID, 2 tablet at 05/02/24 2107 **AND** polyethylene glycol (MIRALAX) packet 17 g, 17 g, Oral, Daily PRN, 17 g at 04/30/24 2038 **AND** bisacodyl (DULCOLAX) EC tablet 5 mg, 5 mg, Oral, Daily PRN, 5 mg at 05/01/24 0956 **AND** bisacodyl (DULCOLAX) suppository 10 mg, 10 mg, Rectal, Daily PRN, Antonio Angeles MD    castor oil-balsam peru (VENELEX) ointment 1 Application, 1 Application,  Topical, Daily, Eyad Ledesma MD, 1 Application at 05/03/24 0851    dextrose (D50W) (25 g/50 mL) IV injection 25 g, 25 g, Intravenous, Q15 Min PRN, Maricel Peraza APRN    glucagon (GLUCAGEN) injection 1 mg, 1 mg, Intramuscular, Q15 Min PRN, Maricel Peraza APRN    HYDROcodone-acetaminophen (NORCO) 5-325 MG per tablet 1 tablet, 1 tablet, Oral, Q4H PRN, Hailee Shukla, DO, 1 tablet at 05/02/24 1441    insulin glargine (LANTUS, SEMGLEE) injection 8 Units, 8 Units, Subcutaneous, Nightly, Luz Elena Batista, , 8 Units at 05/02/24 2109    Insulin Lispro (humaLOG) injection 2-9 Units, 2-9 Units, Subcutaneous, 4x Daily AC & at Bedtime, Antonio Angeles MD, 2 Units at 05/02/24 2109    lactobacillus acidophilus (RISAQUAD) capsule 1 capsule, 1 capsule, Oral, Daily, Maricel Peraza APRN, 1 capsule at 05/03/24 0847    levothyroxine (SYNTHROID, LEVOTHROID) tablet 88 mcg, 88 mcg, Oral, Q AM, Maricel Peraza APRN, 88 mcg at 05/03/24 0556    Magnesium Low Dose Replacement - Follow Nurse / BPA Driven Protocol, , Does not apply, PRN, Maricel Peraza APRN    midodrine (PROAMATINE) tablet 10 mg, 10 mg, Oral, TID AC, Golden Madison MD, 10 mg at 05/03/24 0556    Naloxegol Oxalate (MOVANTIK) tablet 25 mg, 25 mg, Oral, QAM, Antonio Angeles MD, 25 mg at 05/03/24 0847    nitroglycerin (NITROSTAT) SL tablet 0.4 mg, 0.4 mg, Sublingual, Q5 Min PRN, Maricel Peraza APRN    norepinephrine (LEVOPHED) 8 mg in 250 mL NS infusion (premix), 0.02-0.3 mcg/kg/min, Intravenous, Titrated, Eyad Ledesma MD, Last Rate: 2.19 mL/hr at 05/03/24 0945, 0.015 mcg/kg/min at 05/03/24 0945    pantoprazole (PROTONIX) EC tablet 40 mg, 40 mg, Oral, Q AM, Antonio Angeles MD, 40 mg at 05/03/24 0556    polyethylene glycol (MIRALAX) packet 17 g, 17 g, Oral, Daily, Artur Avila MD, 17 g at 05/02/24 1037    prochlorperazine (COMPAZINE) injection 5 mg, 5 mg, Intravenous, Q6H PRN, Maricel Peraza, APRN,  "2.5 mg at 04/26/24 1316    sodium chloride 0.9 % flush 10 mL, 10 mL, Intravenous, PRN, Case, Hailee V., DO    sodium chloride 0.9 % flush 10 mL, 10 mL, Intravenous, Q12H, Case, Hailee V., DO, 10 mL at 05/03/24 0848    tamsulosin (FLOMAX) 24 hr capsule 0.4 mg, 0.4 mg, Oral, Daily, Maricel Peraza W, APRN, 0.4 mg at 05/03/24 0847     Palliative Performance Scale Score: 50%    /67   Pulse 67   Temp 97.9 °F (36.6 °C) (Oral)   Resp 18   Ht 190 cm (74.8\")   Wt 90.4 kg (199 lb 4.7 oz)   SpO2 94%   BMI 25.04 kg/m²     Intake/Output Summary (Last 24 hours) at 5/3/2024 1055  Last data filed at 5/3/2024 1000  Gross per 24 hour   Intake 370.68 ml   Output 2350 ml   Net -1979.32 ml       Physical Exam:    General Appearance:    Alert, cooperative, NAD   HEENT:    NC/AT, EOMI, anicteric, MMM, face relaxed   Neck:   supple, trachea midline, no JVD   Lungs:     CTA bilat, diminished in bases; respirations regular, even     and unlabored    Heart:    RRR, normal S1 and S2, no M/R/G   Abdomen:     Normal bowel sounds, soft, nontender, nondistended   G/U:   Deferred   MSK/Extremities:   No clubbing , cyanosis or edema, No wasting   Pulses:   Pulses palpable and equal bilaterally   Skin:   Warm, dry, no mottling   Neurologic:   A/Ox3, cooperative, moves extremities x 4, no tremor, nl     tone   Psych:   Calm, appropriate         Labs:   Results from last 7 days   Lab Units 05/03/24  0441   WBC 10*3/mm3 10.37   HEMOGLOBIN g/dL 8.9*   HEMATOCRIT % 27.3*   PLATELETS 10*3/mm3 251     Results from last 7 days   Lab Units 05/03/24  0441   SODIUM mmol/L 140   POTASSIUM mmol/L 4.2   CHLORIDE mmol/L 109*   CO2 mmol/L 18.0*   BUN mg/dL 79*   CREATININE mg/dL 3.38*   CALCIUM mg/dL 7.9*   BILIRUBIN mg/dL 0.4   ALK PHOS U/L 74   ALT (SGPT) U/L 106*   AST (SGOT) U/L 13   GLUCOSE mg/dL 110*     Imaging Results (Last 72 Hours)       Procedure Component Value Units Date/Time    SLP FEES - Fiberoptic Endo Eval Swallow [[id number]] " Resulted: 05/02/24 1100     Updated: 05/02/24 1100    Narrative:      This procedure was auto-finalized with no dictation required.                Diagnostics:   No valid procedures specified.    A:     Cardiac arrest    Type 2 diabetes mellitus with hyperglycemia    Hyperlipidemia LDL goal <70    Hypothyroidism (acquired)    Coronary artery disease involving native coronary artery of native heart with angina pectoris    Pacemaker    HFrEF (heart failure with reduced ejection fraction)    Anemia, chronic disease    Stage 3b chronic kidney disease    UTI (urinary tract infection)    Acute respiratory failure with hypoxia    Elevated troponin level not due myocardial infarction         Impression:   S/P vfib arrest  ESHD  DM 2  PPM in situ  HFrEF  A/CKD - Cr 3.38  UTI  Acute hypoxic resp failure - improve        Symptoms:  Dyspnea  Rib fracture pain  Debility       P:      Advance Care Planning     Date of Shared Decision Making Discussion: 05/03/24    Pt and dtr was/were present for the discussion.    Decision Maker:  Dtr reported as PA if pt unable but no documents and requested    SDMSI SUMMARY:    Patient/family describes current medical condition as very bad      Patient/family identified the following as being most important to living well: able to get out of bed and interact      Explored understanding, benefits and burdens, goals for treatment, fears and concerns of  interventions as resuscitation, rehab    Reviewed goals of care for additional medical interventions using decision making framework:      MOST form, Living Will and EMS DNR  introduced if not previously completed.    DECISIONS/RECOMMENDATIONS for FOLLOW-UP:   Pt and dtr agreed that he wound remain no CPR/DNI.  Agreed to hospice information and order placed.  Declined further discussion.    Time started: 0907     Time ended: 0923    Total time: 16 minutes          Thank you for this consult and allowing us to participate in patient's plan of care.  Palliative Care Team will continue to follow patient.       Lexi Flores MD, 5/3/2024, 10:55 EDT

## 2024-05-03 NOTE — PLAN OF CARE
Goal Outcome Evaluation:  Plan of Care Reviewed With: patient, significant other        Progress: improving  Outcome Evaluation: Pt demonstrating improvements this date by completing 3 steps from bed>chair w/ B UE support, modA x2, and by performing STS x2 from chair w/ FWW, modA x2. Pt continues to present below baseline function d/t pain. generlaized weakness, decreased balance, and decreased activity tolerance. Pt would cont to benefit from skilled IP PT. Rec IRF at d/c.      Anticipated Discharge Disposition (PT): inpatient rehabilitation facility

## 2024-05-03 NOTE — PLAN OF CARE
Goal Outcome Evaluation:  Pt refusing bath/turns overnight. Spoke to daughter about possible palliative consult. PRN tylenol given for rib/chest pain. Unable to wean Levophed off, currently infusing at 0.03 mcg/kg/min. UOP 1250 mL. Tmax 99.1

## 2024-05-03 NOTE — THERAPY TREATMENT NOTE
Patient Name: Jermaine Singh  : 1945    MRN: [id number]                              Today's Date: 5/3/2024       Admit Date: 2024    Visit Dx:     ICD-10-CM ICD-9-CM   1. Acute UTI  N39.0 599.0   2. Generalized weakness  R53.1 780.79   3. Edema of right lower extremity  R60.0 782.3   4. Leg erythema  L53.9 695.9   5. Chronic kidney disease, unspecified CKD stage  N18.9 585.9   6. Elevated troponin  R79.89 790.6   7. Hyperkalemia  E87.5 276.7   8. Cardiac arrest  I46.9 427.5   9. Oropharyngeal dysphagia  R13.12 787.22     Patient Active Problem List   Diagnosis    Type 2 diabetes mellitus with hyperglycemia    Hyperlipidemia LDL goal <70    Hypothyroidism (acquired)    Vitamin D deficiency on Rx     Diabetic neuropathy    Coronary artery disease involving native coronary artery of native heart with angina pectoris    Atherosclerosis of native artery of both lower extremities with intermittent claudication    Pacemaker    HFrEF (heart failure with reduced ejection fraction)    DVT, bilateral lower limbs    Cellulitis of right lower extremity    Anemia, chronic disease    PVD (peripheral vascular disease)    Diabetic foot ulcer    GERD without esophagitis    Stage 3b chronic kidney disease    Right inguinal hernia    PAF (paroxysmal atrial fibrillation)    Moderate malnutrition    UTI (urinary tract infection)    Cardiac arrest    Acute respiratory failure with hypoxia    History of DVT (deep vein thrombosis)    Elevated troponin level not due myocardial infarction     Past Medical History:   Diagnosis Date    Allergic     Arthritis     Asthma     Coronary artery disease     Diabetes mellitus 2000    started on [place] 2018; started on po meds in ; checking blood sugars daily     Diabetic foot infection 2023    Disease of thyroid gland     po meds daily for hypothyroidism     Elevated serum creatinine 11/15/2022    Elevated troponin 10/02/2022    Generalized weakness 11/15/2022     History of fracture as a child     rt leg- severe     Hyperlipidemia     Hypertension     Hypothyroidism     PAF (paroxysmal atrial fibrillation) 07/23/2023    Peripheral neuropathy     Peripheral vascular disease     s/p angiogram 2/19-needs stent in left leg     Pleural effusion, bilateral 11/15/2022    Proximal phalanx fracture of the second digit extending into the second metatarsal joint 07/28/2022    RBBB     Right knee pain     Status post amputation of great toe and second toe of left foot 07/23/2023    Status post amputation of great toe, left 11/15/2022    Unstable angina 02/12/2019    Added automatically from request for surgery [id number]    Vitamin D deficiency      Past Surgical History:   Procedure Laterality Date    AMPUTATION DIGIT Left 01/17/2023    Procedure: AMPUTATION DIGIT LEFT;  Surgeon: Gopal Márquez MD;  Location:  [facility];  Service: Vascular;  Laterality: Left;    APPENDECTOMY      CARDIAC CATHETERIZATION N/A 02/14/2019    Procedure: Left Heart Cath;  Surgeon: Cooper Apodaca MD;  Location: [facility] CATH INVASIVE LOCATION;  Service: Cardiology    CARDIAC ELECTROPHYSIOLOGY PROCEDURE N/A 05/18/2022    Procedure: DEVICE IMPLANT;  Surgeon: Cooper Apodaca MD;  Location: [facility] CATH INVASIVE LOCATION;  Service: Cardiology;  Laterality: N/A;    CARDIAC SURGERY      COLONOSCOPY      CORONARY ARTERY BYPASS GRAFT N/A 03/22/2019    Procedure: MEDIAN STERNOTOMY, CORONARY ARTERY BYPASS GRAFT X3, UTILIZING THE LEFT INTERNAL MAMMARY ARTERY, EVH AND OPEN HARVEST OF THE RIGHT GREATER SAPHENOUS VEIN, EXPLORATION OF THE LEFT LEG;  Surgeon: Ap Au MD;  Location: [facility] OR;  Service: Cardiothoracic    EYE SURGERY Bilateral     cataracts     FRACTURE SURGERY      INTERVENTIONAL RADIOLOGY PROCEDURE N/A 07/29/2021    Procedure: Abdominal Aortagram with Runoff;  Surgeon: Jermaine Hernandez MD;  Location: [facility] CATH INVASIVE LOCATION;  Service: Cardiovascular;  Laterality: N/A;    KNEE  ARTHROSCOPY      right x 2, left x 1    LACERATION REPAIR      right leg    LEG SURGERY      2 for fracture of rt leg     TONSILLECTOMY      Adnoidectomy    TRANS METATARSAL AMPUTATION Left 08/02/2022    Procedure: GREAT TOE AMPUTATION LEFT;  Surgeon: Gopal Márquez MD;  Location: [facility];  Service: Vascular;  Laterality: Left;      General Information       Row Name 05/03/24 1556          Physical Therapy Time and Intention    Document Type therapy note (daily note)  -     Mode of Treatment physical therapy  -       Row Name 05/03/24 1556          General Information    Patient Profile Reviewed yes  -     Existing Precautions/Restrictions fall;cardiac;oxygen therapy device and L/min  -     Barriers to Rehab medically complex  -       Row Name 05/03/24 1556          Cognition    Orientation Status (Cognition) oriented x 3  -       Row Name 05/03/24 1556          Safety Issues, Functional Mobility    Safety Issues Affecting Function (Mobility) insight into deficits/self-awareness;awareness of need for assistance;safety precaution awareness;safety precautions follow-through/compliance;sequencing abilities;judgment  -     Impairments Affecting Function (Mobility) balance;endurance/activity tolerance;strength;postural/trunk control;pain;motor control;motor planning;shortness of breath  -               User Key  (r) = Recorded By, (t) = Taken By, (c) = Cosigned By      Initials Name Provider Type     Bessie Servin, SETH Physical Therapist                   Mobility       Row Name 05/03/24 1557          Bed Mobility    Bed Mobility supine-sit  -     Supine-Sit [place] (Bed Mobility) moderate assist (50% patient effort);2 person assist;verbal cues;nonverbal cues (demo/gesture)  -     Assistive Device (Bed Mobility) head of bed elevated;draw sheet;bed rails  -     Comment, (Bed Mobility) VCs for hand placement and sequencing. Pt able to assist w/ advancing BLEs this date. Dizziness noted in  sitting w/ BP stable at 94/67  -[facility] Name 05/03/24 1557          Transfers    Comment, (Transfers) VCs for hand placement and sequencing  -[facility] Name 05/03/24 1557          Bed-Chair Transfer    Bed-Chair [place] (Transfers) moderate assist (50% patient effort);2 person assist;verbal cues  -     Assistive Device (Bed-Chair Transfers) other (see comments)  B UE support  -     Comment, (Bed-Chair Transfer) steps from bed>chair  -[facility] Name 05/03/24 1557          Sit-Stand Transfer    Sit-Stand [place] (Transfers) moderate assist (50% patient effort);2 person assist;verbal cues;nonverbal cues (demo/gesture)  -     Assistive Device (Sit-Stand Transfers) walker, front-wheeled  -     Comment, (Sit-Stand Transfer) STS x1 from EOB, x2 from chair. Max cues for upright posture, forward gaze, and anterior weightshifting  -[facility] Name 05/03/24 1557          Gait/Stairs (Locomotion)    [place] Level (Gait) moderate assist (50% patient effort);2 person assist;verbal cues  -     Assistive Device (Gait) other (see comments)  B UE support  -     Patient was able to Ambulate yes  -     Distance in Feet (Gait) 3  -     Deviations/Abnormal Patterns (Gait) ashli decreased;gait speed decreased;stride length decreased;weight shifting decreased;bilateral deviations  -     Bilateral Gait Deviations forward flexed posture;heel strike decreased  -     Comment, (Gait/Stairs) Pt completed 3 steps from bed>chair. Demo short shuffled steps w/ increased forward flexed posture. Max cues for upright posture, sequencing, and safety awareness. Further distance limited by weakness and fatigue  -               User Key  (r) = Recorded By, (t) = Taken By, (c) = Cosigned By      Initials Name Provider Type     Bessie Servin PT Physical Therapist                   Obj/Interventions       [facility] Name 05/03/24 1608          Balance    Balance Assessment sitting static balance;sitting  dynamic balance;sit to stand dynamic balance;standing static balance;standing dynamic balance  -     Static Sitting Balance minimal assist  -     Dynamic Sitting Balance moderate assist  -     Position, Sitting Balance unsupported;sitting edge of bed  -     Sit to Stand Dynamic Balance moderate assist;2-person assist;verbal cues  -     Static Standing Balance moderate assist;2-person assist;verbal cues  -     Dynamic Standing Balance moderate assist;2-person assist;verbal cues  -     Position/Device Used, Standing Balance supported;walker, front-wheeled  -     Balance Interventions sitting;standing;sit to stand;supported;static;dynamic  -     Comment, Balance Prolonged standing using FWW for dep pericare d/t BM. Max cues for upright posture  -               User Key  (r) = Recorded By, (t) = Taken By, (c) = Cosigned By      Initials Name Provider Type     Bessie Servin, PT Physical Therapist                   Goals/Plan    No documentation.                  Clinical Impression       Row Name 05/03/24 1603          Pain    Pain Intervention(s) Repositioned;Ambulation/increased activity  -     Additional Documentation Pain Scale: FACES Pre/Post-Treatment (Group)  -       Row Name 05/03/24 1603          Pain Scale: FACES Pre/Post-Treatment    Pain: FACES Scale, Pretreatment 4-->hurts little more  -     Posttreatment Pain Rating 4-->hurts little more  -     Pain Location upper  -     Pain Location - back  -       Row Name 05/03/24 1603          Plan of Care Review    Plan of Care Reviewed With patient;significant other  -     Progress improving  -     Outcome Evaluation Pt demonstrating improvements this date by completing 3 steps from bed>chair w/ B UE support, modA x2, and by performing STS x2 from chair w/ FWW, modA x2. Pt continues to present below baseline function d/t pain. generlaized weakness, decreased balance, and decreased activity tolerance. Pt would cont to benefit  from skilled IP PT. Rec IRF at d/c.  -       Row Name 05/03/24 1603          Vital Signs    Pre Systolic BP Rehab 90  -LH     Pre Treatment Diastolic BP 67  -LH     Intra Systolic BP Rehab 94  -LH     Intra Treatment Diastolic BP 67  -LH     Post Systolic BP Rehab 83  -LH     Post Treatment Diastolic BP 52  -LH     Pretreatment Heart Rate (beats/min) 72  -LH     Posttreatment Heart Rate (beats/min) 71  -LH     Pre SpO2 (%) 97  -LH     O2 Delivery Pre Treatment room air  -LH     O2 Delivery Intra Treatment room air  -LH     Post SpO2 (%) 98  -LH     O2 Delivery Post Treatment room air  -LH     Pre Patient Position Supine  -LH     Intra Patient Position Standing  -LH     Post Patient Position Sitting  -       Row Name 05/03/24 1603          Positioning and Restraints    Pre-Treatment Position in bed  -LH     Post Treatment Position chair  -LH     In Chair notified nsg;reclined;sitting;call light within reach;encouraged to call for assist;exit alarm on;with family/caregiver;waffle cushion;on mechanical lift sling;legs elevated  -               User Key  (r) = Recorded By, (t) = Taken By, (c) = Cosigned By      Initials Name Provider Type     Bessie Servin, PT Physical Therapist                   Outcome Measures       Row Name 05/03/24 1606          How much help from another person do you currently need...    Turning from your back to your side while in flat bed without using bedrails? 2  -LH     Moving from lying on back to sitting on the side of a flat bed without bedrails? 2  -LH     Moving to and from a bed to a chair (including a wheelchair)? 2  -LH     Standing up from a chair using your arms (e.g., wheelchair, bedside chair)? 2  -LH     Climbing 3-5 steps with a railing? 1  -LH     To walk in hospital room? 2  -LH     AM-PAC 6 Clicks Score (PT) 11  -     Highest Level of Mobility Goal 4 --> Transfer to chair/commode  -       Row Name 05/03/24 1606 05/03/24 1547       Functional Assessment     Outcome Measure Options AM-PAC 6 Clicks Basic Mobility (PT)  - AM-PAC 6 Clicks Daily Activity (OT)  -REECE              User Key  (r) = Recorded By, (t) = Taken By, (c) = Cosigned By      Initials Name Provider Type     Bessie Servin, PT Physical Therapist    Naye Davila, OT Occupational Therapist                                 Physical Therapy Education       Title: PT OT SLP Therapies (In Progress)       Topic: Physical Therapy (In Progress)       Point: Mobility training (In Progress)       Learning Progress Summary             Patient Acceptance, E, NR by  at 5/3/2024 1607    Acceptance, E, NR by  at 5/1/2024 1606    Acceptance, E, NR by  at 4/30/2024 1153    Acceptance, E, NR by  at 4/29/2024 1159    Eager, E, VU,DU,NR by  at 4/24/2024 1452    Comment: Reviewed safety/technique w/transfers, ambulation, HEP, PT POC    Acceptance, E, VU by  at 4/23/2024 1120   Family Acceptance, E, NR by  at 5/3/2024 1607    Acceptance, E, NR by  at 4/30/2024 1153    Acceptance, E, NR by  at 4/29/2024 1159    Acceptance, E, VU by  at 4/23/2024 1120                         Point: Home exercise program (In Progress)       Learning Progress Summary             Patient Acceptance, E, NR by  at 5/1/2024 1606    Acceptance, E, NR by  at 4/30/2024 1153    Eager, E, VU,DU,NR by  at 4/24/2024 1452    Comment: Reviewed safety/technique w/transfers, ambulation, HEP, PT POC   Family Acceptance, E, NR by  at 4/30/2024 1153                         Point: Body mechanics (In Progress)       Learning Progress Summary             Patient Acceptance, E, NR by  at 5/3/2024 1607    Acceptance, E, NR by  at 5/1/2024 1606    Acceptance, E, NR by  at 4/30/2024 1153    Acceptance, E, NR by  at 4/29/2024 1159    Eager, E, VU,DU,NR by  at 4/24/2024 1452    Comment: Reviewed safety/technique w/transfers, ambulation, HEP, PT POC    Acceptance, E, VU by KR at 4/23/2024 1120   Family Acceptance, E, NR by ZEYAD at  5/3/2024 1607    Acceptance, E, NR by  at 4/30/2024 1153    Acceptance, E, NR by  at 4/29/2024 1159    Acceptance, E, VU by  at 4/23/2024 1120                         Point: Precautions (In Progress)       Learning Progress Summary             Patient Acceptance, E, NR by  at 5/3/2024 1607    Acceptance, E, NR by  at 5/1/2024 1606    Acceptance, E, NR by  at 4/30/2024 1153    Acceptance, E, NR by  at 4/29/2024 1159    Eager, E, VU,DU,NR by  at 4/24/2024 1452    Comment: Reviewed safety/technique w/transfers, ambulation, HEP, PT POC    Acceptance, E, VU by  at 4/23/2024 1120   Family Acceptance, E, NR by  at 5/3/2024 1607    Acceptance, E, NR by  at 4/30/2024 1153    Acceptance, E, NR by  at 4/29/2024 1159    Acceptance, E, VU by  at 4/23/2024 1120                                         User Key       Initials Effective Dates Name Provider Type Discipline     06/01/21 -  Jenna Stanley, PT Physical Therapist PT     12/30/22 -  Tiffanie Erwin, PT Physical Therapist PT     09/21/23 -  Bessie Servin, PT Physical Therapist PT                  PT Recommendation and Plan  Planned Therapy Interventions (PT): balance training, bed mobility training, gait training, home exercise program, neuromuscular re-education, patient/family education, postural re-education, ROM (range of motion), strengthening, stretching, stair training, transfer training  Plan of Care Reviewed With: patient, significant other  Progress: improving  Outcome Evaluation: Pt demonstrating improvements this date by completing 3 steps from bed>chair w/ B UE support, modA x2, and by performing STS x2 from chair w/ FWW, modA x2. Pt continues to present below baseline function d/t pain. generlaized weakness, decreased balance, and decreased activity tolerance. Pt would cont to benefit from skilled IP PT. Rec IRF at d/c.     Time Calculation:         PT Charges       Row Name 05/03/24 1607 05/03/24 1105          Time  Calculation    Start Time 1430  -LH 1105  -KW     PT Received On 05/03/24  - --     PT Goal Re-Cert Due Date 05/09/24  -LH --        Timed Charges    79780 - Gait Training Minutes  5  -LH --     81320 - PT Therapeutic Activity Minutes 25  -LH --        Untimed Charges    77595-Fsxs Boot -- 35  -KW     37862-Ztokfejwp debridement -- 10  -KW        Total Minutes    Timed Charges Total Minutes 30  -LH --     Untimed Charges Total Minutes -- 45  -KW      Total Minutes 30  -LH 45  -KW               User Key  (r) = Recorded By, (t) = Taken By, (c) = Cosigned By      Initials Name Provider Type    Henna Segovia, PT Physical Therapist     Bessie Servin, SETH Physical Therapist                  Therapy Charges for Today       Code Description Service Date Service Provider Modifiers Qty    [id number]  PT THERAPEUTIC ACT EA 15 MIN 5/3/2024 Bessie Servin, PT GP 2            PT G-Codes  Outcome Measure Options: AM-PAC 6 Clicks Basic Mobility (PT)  AM-PAC 6 Clicks Score (PT): 11  AM-PAC 6 Clicks Score (OT): 12  Modified Everson Scale: 4 - Moderately severe disability.  Unable to walk without assistance, and unable to attend to own bodily needs without assistance.  PT Discharge Summary  Anticipated Discharge Disposition (PT): inpatient rehabilitation facility    Bessie Servin PT  5/3/2024

## 2024-05-03 NOTE — PROGRESS NOTES
" [facility] - Progress Note    Jermaine JIMENES Singh  1945  [id number]/1    05/03/24, 08:29 EDT      Chief Complaint: Following for cardiac arrest    Subjective:   Awake in bed, daughter at bedside.  Remains on low dose pressor support.  States he slept well.    Had a long discussion with his daughter.  She understands that medical status is terminal and agrees that Palliative Medicine/Hospice would be beneficial to her father.          Review of Systems:  Pertinent positives are listed above and in physical exam.  All others have been reviewed and are negative.    apixaban, 2.5 mg, Oral, Q12H  aspirin, 81 mg, Oral, Daily  atorvastatin, 20 mg, Oral, Nightly  castor oil-balsam peru, 1 Application, Topical, Daily  insulin glargine, 8 Units, Subcutaneous, Nightly  Insulin Lispro, 2-9 Units, Subcutaneous, 4x Daily AC & at Bedtime  lactobacillus acidophilus, 1 capsule, Oral, Daily  levothyroxine, 88 mcg, Oral, Q AM  midodrine, 10 mg, Oral, TID AC  Naloxegol Oxalate, 25 mg, Oral, QAM  pantoprazole, 40 mg, Oral, Q AM  polyethylene glycol, 17 g, Oral, Daily  senna-docusate sodium, 2 tablet, Oral, BID  sodium chloride, 10 mL, Intravenous, Q12H  tamsulosin, 0.4 mg, Oral, Daily        Objective:  Vitals:   height is 190 cm (74.8\") and weight is 90.4 kg (199 lb 4.7 oz). His bladder temperature is 98.6 °F (37 °C). His blood pressure is 112/71 and his pulse is 80. His respiration is 16 and oxygen saturation is 96%.     Intake/Output Summary (Last 24 hours) at 5/3/2024 0829  Last data filed at 5/3/2024 0600  Gross per 24 hour   Intake 370.68 ml   Output 1975 ml   Net -1604.32 ml       Physical Exam:  General:  WN, Appears stated age  CV:  Normal S1,S2. No murmur, rub, or gallop. + Pacemaker  Resp:  CTA [place], equal, nonlabored.  Abd:  Soft, + BS, no organomegaly. Nontender to palpation.  Extrem:  RLE with wrap, trace LLE.           Results from last 7 days   Lab Units 05/03/24  0441   WBC 10*3/mm3 10.37   HEMOGLOBIN g/dL " "8.9*   HEMATOCRIT % 27.3*   PLATELETS 10*3/mm3 251     Results from last 7 days   Lab Units 05/03/24  0441   SODIUM mmol/L 140   POTASSIUM mmol/L 4.2   CHLORIDE mmol/L 109*   CO2 mmol/L 18.0*   BUN mg/dL 79*   CREATININE mg/dL 3.38*   CALCIUM mg/dL 7.9*   BILIRUBIN mg/dL 0.4   ALK PHOS U/L 74   ALT (SGPT) U/L 106*   AST (SGOT) U/L 13   GLUCOSE mg/dL 110*                         Tele: V paced      Assessment/Plan:  Cardiac arrest/VF arrest  Substantial substrate for VF arrest including ischemic heart disease with severely diffusely diseased LAD as well as newly identified severe LV dysfunction with EF <20%  Films reviewed by cards 4/25; \"Based on prebypass angiogram, I do not suspect readily \"intervenable\" lesions to correct ischemic substrate.  LAD is severely diffusely diseased and would not be suitable for percutaneous treatment.  Frankly, I am impressed that Dr. Au was able to bypass the LAD in 2019.\"  Recommend against emergent cardiac catheterization given his hemodynamic instability and unlikelihood of \"targets\" to correct ischemic substrate  Continue aspirin and statin  No beta-blocker due to hypotension, resume as pressor support weaned/BP tolerates  Discontinue IV amiodarone.  If recurrence of VT/VF, start lidocaine  Repeat echo reviewed     Severe LV dysfunction with EF <20%  Newly diagnosed.  Last LVEF 48% in 2022  Unclear whether this is related to stunning from VF arrest or whether this has been pre-existing his admission.  I suspect the latter as patient has had dwindling functional capacity  Presently with some element of cardiogenic shock which prohibits GDMT  SJ pacemaker interrogation reviewed  Repeat echo reviewed     Multifactorial shock  Cardiogenic, possibly septic  Wean vasopressors to maintain MAP >70 mmHg  Improving        Type 2 diabetes mellitus not on insulin with hyperglycemia  Uncontrolled (untreated?) diabetes prior to admission  Has only not candidate for SGL 2 inhibitor due to " renal insufficiency  Start Glucomander protocol     Hyperlipidemia with goal LDL <70  LDL well-controlled  Continue to hold statin although LFTs are improving.     Stage IIIb CKD  Hold losartan due to hypotension and anticipated [place] from cardiac event  Labs reviewed, received 80mg IV lasix yesterday, level plateau       History of DVT  Dose adjust Eliquis restarted.     Paroxysmal Atrial Fibrillation  [place] device interrogated Friday, reviewed.  Report on  chart.  Dose adjust NOAC resumed        Again, Cardiology/Nephrology have spoken with daughter in length.  Patient's medical status is terminal.  Continue current support.  Palliative Care appropriate and daughter understands their role in care of her father. Will consult Palliative services.    Cardiology will see again on Monday.  Please call covering service if needed for cardiac issues over the weekend.    I discussed the patient's findings and my recommendations with the patient, any present family members, and the nursing staff.  Cooper Apodaca MD saw and examined patient, verified hx and PE, read all radiographic studies, reviewed labs and micro data, and formulated dx, plan for treatment and all medical decision making.      Megan Shukla PA-C  05/03/24, 08:33 EDT

## 2024-05-03 NOTE — CONSULTS
Hospice referral received, chart reviewed, and visit made. Spoke with pt and wife at bedside. Reviewed hospice plan of care. Overview of hospice services provided. Educated on hospice services in home/facility/inpatient settings. Wife states that she would like to speak with palliative however they were here to see pt earlier. Pt and wife overwhelmed with information today and okayed for hospice and palliative to come back on Monday for a co-visit. Family meeting scheduled for Monday at 1400 in patient's room.    For further assistance, please call [phone].       Kristel Lucero, RN, BSN, Hospice Liaison

## 2024-05-03 NOTE — PLAN OF CARE
Goal Outcome Evaluation:  Plan of Care Reviewed With: patient, significant other        Progress: improving  Outcome Evaluation: Pt demo improvement in activity tolerance and mobility during tx session. Session continues to be limited by generalized weakness and dizziness. Will continue OT POC to progress towards goals. d/c rec is IPR.      Anticipated Discharge Disposition (OT): inpatient rehabilitation facility

## 2024-05-03 NOTE — PLAN OF CARE
"Goal Outcome Evaluation:  Plan of Care Reviewed With: patient, daughter        Progress: no change  Outcome Evaluation: Palliative RN and Dr. RIVERA Flores visited pt on 5/3 at 0934.  New palliative referral for assistance with GOC, support and hospice conversation per SAHRA Shukla PA-C.  No HCS on file.  NOK is spouse Nerissa Singh and Dulce Hanley.  Daughter Dulce was at BS during palliative consultation.  Pt. stated that he would want her to be his HCS in the event that he could not make decisions for himself.  Dulce stated there is paperwork with their  that designates her as his DPOA.  Documents requested.  Dr. Flores discussed the seriousness of patient's health and asked him would he be ok with hospice coming by to offer information on home hospice support.  Pt. and daughter agreed that they would like that information.  Just received call from hospice liaison who stated that they are uncertain if they are interested in hospice after hearing what a hospice POC entailed.  Wife would like family meeting with palliative and hospice around 1400 on Monday.  Per hospice liaison report, wife stated that the pt. did not know that he was so sick.  Explained to hospice liaison that pt. has been told by multiple physicians today that he is \"terminal\".  From Dr. Naranjo's note today 5/3-\"Again, Cardiology/Nephrology have spoken with daughter in length.  Patient's medical status is terminal.  Continue current support.  Palliative Care appropriate and daughter understands their role in care of her father. Will consult Palliative services.\"  Pt. denied pain at rest but endorsed pain from fractured ribs with movement.  He endorsed mild dyspnea at rest.  He denied nausea.  Pt. denied any needs from palliative at this time.  Pt. is NO CPR limited support.  Palliative care to follow along for support, POC and ongoing GOC.      Problem: Palliative Care  Goal: Enhanced Quality of Life  Intervention: Promote Advance Care " Planning  Flowsheets (Taken 5/3/2024 1410)  Life Transition/Adjustment:   palliative care initiated   palliative care discussed  1300 Palliative IDT meeting: MARK Sue RN, CHPN; SAHRA Simmons, APRN;NICK Joseph MDiv, [facility] ;RIVERA Flores MD.; JASWINDER Antunez RN, CHPN    After hours, weekends and holidays, contact Palliative Provider by calling [phone].                           normal

## 2024-05-03 NOTE — THERAPY WOUND CARE TREATMENT
Acute Care - Wound/Debridement Treatment Note   [place]     Patient Name: Jermaine Singh  : 1945  MRN: [id number]  Today's Date: 5/3/2024                Admit Date: 2024    Visit Dx:    ICD-10-CM ICD-9-CM   1. Acute UTI  N39.0 599.0   2. Generalized weakness  R53.1 780.79   3. Edema of right lower extremity  R60.0 782.3   4. Leg erythema  L53.9 695.9   5. Chronic kidney disease, unspecified CKD stage  N18.9 585.9   6. Elevated troponin  R79.89 790.6   7. Hyperkalemia  E87.5 276.7   8. Cardiac arrest  I46.9 427.5   9. Oropharyngeal dysphagia  R13.12 787.22       Patient Active Problem List   Diagnosis    Type 2 diabetes mellitus with hyperglycemia    Hyperlipidemia LDL goal <70    Hypothyroidism (acquired)    Vitamin D deficiency on Rx     Diabetic neuropathy    Coronary artery disease involving native coronary artery of native heart with angina pectoris    Atherosclerosis of native artery of both lower extremities with intermittent claudication    Pacemaker    HFrEF (heart failure with reduced ejection fraction)    DVT, bilateral lower limbs    Cellulitis of right lower extremity    Anemia, chronic disease    PVD (peripheral vascular disease)    Diabetic foot ulcer    GERD without esophagitis    Stage 3b chronic kidney disease    Right inguinal hernia    PAF (paroxysmal atrial fibrillation)    Moderate malnutrition    UTI (urinary tract infection)    Cardiac arrest    Acute respiratory failure with hypoxia    History of DVT (deep vein thrombosis)    Elevated troponin level not due myocardial infarction        Past Medical History:   Diagnosis Date    Allergic     Arthritis     Asthma     Coronary artery disease     Diabetes mellitus 2000    started on inuslin 2018; started on po meds in ; checking blood sugars daily     Diabetic foot infection 2023    Disease of thyroid gland     po meds daily for hypothyroidism     Elevated serum creatinine 11/15/2022    Elevated troponin  10/02/2022    Generalized weakness 11/15/2022    History of fracture as a child     rt leg- severe     Hyperlipidemia     Hypertension     Hypothyroidism     PAF (paroxysmal atrial fibrillation) 07/23/2023    Peripheral neuropathy     Peripheral vascular disease     s/p angiogram 2/19-needs stent in left leg     Pleural effusion, bilateral 11/15/2022    Proximal phalanx fracture of the second digit extending into the second metatarsal joint 07/28/2022    RBBB     Right knee pain     Status post amputation of great toe and second toe of left foot 07/23/2023    Status post amputation of great toe, left 11/15/2022    Unstable angina 02/12/2019    Added automatically from request for surgery [id number]    Vitamin D deficiency         Past Surgical History:   Procedure Laterality Date    AMPUTATION DIGIT Left 01/17/2023    Procedure: AMPUTATION DIGIT LEFT;  Surgeon: Gopal Márquez MD;  Location:  [facility];  Service: Vascular;  Laterality: Left;    APPENDECTOMY      CARDIAC CATHETERIZATION N/A 02/14/2019    Procedure: Left Heart Cath;  Surgeon: Cooper Apodaca MD;  Location:  [facility];  Service: Cardiology    CARDIAC ELECTROPHYSIOLOGY PROCEDURE N/A 05/18/2022    Procedure: DEVICE IMPLANT;  Surgeon: Cooper Apodaca MD;  Location:  [facility];  Service: Cardiology;  Laterality: N/A;    CARDIAC SURGERY      COLONOSCOPY      CORONARY ARTERY BYPASS GRAFT N/A 03/22/2019    Procedure: MEDIAN STERNOTOMY, CORONARY ARTERY BYPASS GRAFT X3, UTILIZING THE LEFT INTERNAL MAMMARY ARTERY, EVH AND OPEN HARVEST OF THE RIGHT GREATER SAPHENOUS VEIN, EXPLORATION OF THE LEFT LEG;  Surgeon: Ap Au MD;  Location:  [facility];  Service: Cardiothoracic    EYE SURGERY Bilateral     cataracts     FRACTURE SURGERY      INTERVENTIONAL RADIOLOGY PROCEDURE N/A 07/29/2021    Procedure: Abdominal Aortagram with Runoff;  Surgeon: Jermaine Hernandez MD;  Location:  [facility];   Service: Cardiovascular;  Laterality: N/A;    KNEE ARTHROSCOPY      right x 2, left x 1    LACERATION REPAIR      right leg    LEG SURGERY      2 for fracture of rt leg     TONSILLECTOMY      Adnoidectomy    TRANS METATARSAL AMPUTATION Left 08/02/2022    Procedure: GREAT TOE AMPUTATION LEFT;  Surgeon: Gopal Márquez MD;  Location: [facility] OR;  Service: Vascular;  Laterality: Left;           Wound 04/22/24 2240 Left anterior third toe (Active)   Dressing Appearance open to air 05/03/24 1000   Closure Open to air 05/03/24 1200   Base pink;red;clean 05/03/24 1200   Periwound Care dry periwound area maintained 05/03/24 0800       Wound 04/22/24 2240 Right posterior plantar Blisters (Active)   Dressing Appearance dry;intact 05/03/24 1200   Closure Unable to assess 05/03/24 1200   Base dressing in place, unable to visualize 05/03/24 1200   Periwound Care dry periwound area maintained 05/03/24 1000       Wound 04/22/24 2240 Right lower leg Other (comment) (Active)   Dressing Appearance dry;intact 05/03/24 1000   Closure Unable to assess 05/03/24 1200   Base dressing in place, unable to visualize 05/03/24 1200       Wound 04/27/24 2130 Bilateral gluteal MASD (Moisture associated skin damage) (Active)   Dressing Appearance dry;intact 05/03/24 1000   Closure None 05/03/24 1200   Base dressing in place, unable to visualize 05/03/24 1200      Lymphedema       Row Name 05/03/24 1105             Lymphedema Edema Assessment    Ptting Edema Category By severity  -KW      Pitting Edema Mild  -KW         Skin Changes/Observations    Location/Assessment Lower Extremity  -KW      Lower Extremity Conditions right:;inflamed  -KW      Lower Extremity Color/Pigment right:;erythema  -KW         Lymphedema Pulses/Capillary Refill    Lymphedema Pulses/Capillary Refill capillary refill  -KW      Capillary Refill lower extremity capillary refill  -KW      Lower Extremity Capillary Refill right:;left:;less than 3 seconds  -KW          Compression/Skin Care    Compression/Skin Care skin care;wrapping location;bandaging  -KW      Skin Care washed/dried;lotion applied  -KW      Wrapping Location lower extremity  -KW      Wrapping Location LE bilateral:;foot to knee  -KW      Wrapping Comments unna boots in clamshell pattern secured with coban and spandage  -KW                User Key  (r) = Recorded By, (t) = Taken By, (c) = Cosigned By      Initials Name Provider Type    KW Henna Rios, PT Physical Therapist                    WOUND DEBRIDEMENT  Total area of Debridement: 10cm2  Debridement Site 1  Location- Site 1: RLE wound  Selective Debridement- Site 1: Wound Surface <20cmsq  Instruments- Site 1: tweezers  Excised Tissue Description- Site 1: moderate, slough (scab)  Bleeding- Site 1: none               PT Assessment (Last 12 Hours)       PT Evaluation and Treatment       Row Name 05/03/24 1105          Physical Therapy Time and Intention    Subjective Information no complaints  -KW     Document Type wound care;therapy note (daily note)  -KW     Mode of Treatment physical therapy  -KW       Row Name 05/03/24 1105          General Information    Patient Profile Reviewed yes  -KW       Row Name             Wound 04/22/24 2240 Left anterior third toe    Wound - Properties Group Placement Date: 04/22/24  -BS Placement Time: 2240  -BS Side: Left  -BS Orientation: anterior  -BS Location: third toe  -BS    Retired Wound - Properties Group Placement Date: 04/22/24  -BS Placement Time: 2240  -BS Side: Left  -BS Orientation: anterior  -BS Location: third toe  -BS    Retired Wound - Properties Group Date first assessed: 04/22/24  -BS Time first assessed: 2240  -BS Side: Left  -BS Location: third toe  -BS      Row Name             Wound 04/22/24 2240 Right posterior plantar Blisters    Wound - Properties Group Placement Date: 04/22/24  -BS Placement Time: 2240  -BS Side: Right  -BS Orientation: posterior  -BS Location: plantar  -BS Primary Wound  Type: Blisters  -BS    Retired Wound - Properties Group Placement Date: 04/22/24  -BS Placement Time: 2240  -BS Side: Right  -BS Orientation: posterior  -BS Location: plantar  -BS Primary Wound Type: Blisters  -BS    Retired Wound - Properties Group Date first assessed: 04/22/24  -BS Time first assessed: 2240  -BS Side: Right  -BS Location: plantar  -BS Primary Wound Type: Blisters  -BS      Row Name 05/03/24 1105          Wound 04/22/24 2240 Right lower leg Other (comment)    Wound - Properties Group Placement Date: 04/22/24  -BS Placement Time: 2240  -BS Side: Right  -BS Orientation: lower  -BS Location: leg  -BS Primary Wound Type: Other  -BS    Base granulating;scab;slough  -KW     Retired Wound - Properties Group Placement Date: 04/22/24  -BS Placement Time: 2240  -BS Side: Right  -BS Orientation: lower  -BS Location: leg  -BS Primary Wound Type: Other  -BS    Retired Wound - Properties Group Date first assessed: 04/22/24  -BS Time first assessed: 2240  -BS Side: Right  -BS Location: leg  -BS Primary Wound Type: Other  -BS      Row Name             Wound 04/27/24 2130 Bilateral gluteal MASD (Moisture associated skin damage)    Wound - Properties Group Placement Date: 04/27/24  -AM Placement Time: 2130  -AM Side: Bilateral  -AM Location: gluteal  -AM Primary Wound Type: MASD  -AM    Retired Wound - Properties Group Placement Date: 04/27/24  -AM Placement Time: 2130  -AM Side: Bilateral  -AM Location: gluteal  -AM Primary Wound Type: MASD  -AM    Retired Wound - Properties Group Date first assessed: 04/27/24  -AM Time first assessed: 2130  -AM Side: Bilateral  -AM Location: gluteal  -AM Primary Wound Type: MASD  -AM      Row Name 05/03/24 1105          Plan of Care Review    Plan of Care Reviewed With patient  -KW     Progress improving  -KW     Outcome Evaluation Pt demonstrating good improvements in BLE edema and skin integrity in response to compression wraps. PT able to slough from wound bed to promote  healing and improved granulation. PT encouraged BLE elevation and ankle pumps for edema management. Pt would continue to benefit from unna boots changes every 2-3 days to further promote wound healing, venous return, reduce limb girth, and improve skin integrity  -               User Key  (r) = Recorded By, (t) = Taken By, (c) = Cosigned By      Initials Name Provider Type    Vivian Manning, RN Registered Nurse    Lv Darby RN Registered Nurse    Henna Segovia PT Physical Therapist                  Physical Therapy Education       Title: PT OT SLP Therapies (In Progress)       Topic: Physical Therapy (In Progress)       Point: Mobility training (In Progress)       Learning Progress Summary             Patient Acceptance, E, NR by  at 5/1/2024 1606    Acceptance, E, NR by  at 4/30/2024 1153    Acceptance, E, NR by  at 4/29/2024 1159    Eager, E, VU,DU,NR by  at 4/24/2024 1452    Comment: Reviewed safety/technique w/transfers, ambulation, HEP, PT POC    Acceptance, E, VU by  at 4/23/2024 1120   Family Acceptance, E, NR by  at 4/30/2024 1153    Acceptance, E, NR by  at 4/29/2024 1159    Acceptance, E, VU by  at 4/23/2024 1120                         Point: Home exercise program (In Progress)       Learning Progress Summary             Patient Acceptance, E, NR by  at 5/1/2024 1606    Acceptance, E, NR by  at 4/30/2024 1153    Eager, E, VU,DU,NR by  at 4/24/2024 1452    Comment: Reviewed safety/technique w/transfers, ambulation, HEP, PT POC   Family Acceptance, E, NR by  at 4/30/2024 1153                         Point: Body mechanics (In Progress)       Learning Progress Summary             Patient Acceptance, E, NR by  at 5/1/2024 1606    Acceptance, E, NR by  at 4/30/2024 1153    Acceptance, E, NR by  at 4/29/2024 1159    Eager, E, VU,DU,NR by  at 4/24/2024 1452    Comment: Reviewed safety/technique w/transfers, ambulation, HEP, PT POC    Acceptance, E, VU  by KR at 4/23/2024 1120   Family Acceptance, E, NR by  at 4/30/2024 1153    Acceptance, E, NR by  at 4/29/2024 1159    Acceptance, E, VU by  at 4/23/2024 1120                         Point: Precautions (In Progress)       Learning Progress Summary             Patient Acceptance, E, NR by  at 5/1/2024 1606    Acceptance, E, NR by  at 4/30/2024 1153    Acceptance, E, NR by  at 4/29/2024 1159    Eager, E, VU,DU,NR by  at 4/24/2024 1452    Comment: Reviewed safety/technique w/transfers, ambulation, HEP, PT POC    Acceptance, E, VU by  at 4/23/2024 1120   Family Acceptance, E, NR by  at 4/30/2024 1153    Acceptance, E, NR by  at 4/29/2024 1159    Acceptance, E, VU by  at 4/23/2024 1120                                         User Key       Initials Effective Dates Name Provider Type Discipline     06/01/21 -  Jenna Stanley, PT Physical Therapist PT     12/30/22 -  Tiffanie Erwin, PT Physical Therapist PT     09/21/23 -  eBssie Servin, PT Physical Therapist PT                    Recommendation and Plan  Anticipated Equipment Needs at Discharge (PT): front wheeled walker  Anticipated Discharge Disposition (PT): inpatient rehabilitation facility  Planned Therapy Interventions (PT): balance training, bed mobility training, gait training, home exercise program, neuromuscular re-education, patient/family education, postural re-education, ROM (range of motion), strengthening, stretching, stair training, transfer training  Therapy Frequency (PT): daily  Plan of Care Reviewed With: patient   Progress: improving       Progress: improving  Outcome Evaluation: Pt demonstrating good improvements in BLE edema and skin integrity in response to compression wraps. PT able to slough from wound bed to promote healing and improved granulation. PT encouraged BLE elevation and ankle pumps for edema management. Pt would continue to benefit from unna boots changes every 2-3 days to further promote wound healing,  venous return, reduce limb girth, and improve skin integrity  Plan of Care Reviewed With: patient            Time Calculation   PT Charges       Row Name 05/03/24 1105             Time Calculation    Start Time 1105  -KW         Untimed Charges    29580-Unna Boot 35  -KW      42734-Lkbannhwk debridement 10  -KW         Total Minutes    Untimed Charges Total Minutes 45  -KW       Total Minutes 45  -KW                User Key  (r) = Recorded By, (t) = Taken By, (c) = Cosigned By      Initials Name Provider Type    Henna Segovia PT Physical Therapist                      Therapy Charges for Today       Code Description Service Date Service Provider Modifiers Qty    73504001026 HC ROXANNE DEBRIDE OPEN WOUND UP TO 20CM 5/3/2024 Henna Rios, SETH GP 1    60349437275 HC PT STAPPING UNNA BOOT 5/3/2024 Henna Rios PT GP 1              PT G-Codes  Outcome Measure Options: AM-PAC 6 Clicks Basic Mobility (PT)  AM-PAC 6 Clicks Score (PT): 10  AM-PAC 6 Clicks Score (OT): 12  Modified [place] Scale: 4 - Moderately severe disability.  Unable to walk without assistance, and unable to attend to own bodily needs without assistance.       Henna Rios PT  5/3/2024

## 2024-05-04 LAB
ALBUMIN SERPL-MCNC: 2.8 G/DL (ref 3.5–5.2)
ALBUMIN/GLOB SERPL: 1.2 G/DL
ALP SERPL-CCNC: 77 U/L (ref 39–117)
ALT SERPL W P-5'-P-CCNC: 84 U/L (ref 1–41)
ANION GAP SERPL CALCULATED.3IONS-SCNC: 12 MMOL/L (ref 5–15)
AST SERPL-CCNC: 12 U/L (ref 1–40)
BASOPHILS # BLD AUTO: 0.06 10*3/MM3 (ref 0–0.2)
BASOPHILS NFR BLD AUTO: 0.6 % (ref 0–1.5)
BILIRUB SERPL-MCNC: 0.4 MG/DL (ref 0–1.2)
BUN SERPL-MCNC: 72 MG/DL (ref 8–23)
BUN/CREAT SERPL: 24.4 (ref 7–25)
CALCIUM SPEC-SCNC: 8 MG/DL (ref 8.6–10.5)
CHLORIDE SERPL-SCNC: 110 MMOL/L (ref 98–107)
CO2 SERPL-SCNC: 20 MMOL/L (ref 22–29)
CORTIS SERPL-MCNC: 9.24 MCG/DL
CREAT SERPL-MCNC: 2.95 MG/DL (ref 0.76–1.27)
DEPRECATED RDW RBC AUTO: 46.7 FL (ref 37–54)
EGFRCR SERPLBLD CKD-EPI 2021: 20.9 ML/MIN/1.73
EOSINOPHIL # BLD AUTO: 0.3 10*3/MM3 (ref 0–0.4)
EOSINOPHIL NFR BLD AUTO: 3.2 % (ref 0.3–6.2)
ERYTHROCYTE [DISTWIDTH] IN BLOOD BY AUTOMATED COUNT: 15 % (ref 12.3–15.4)
GLOBULIN UR ELPH-MCNC: 2.4 GM/DL
GLUCOSE BLDC GLUCOMTR-MCNC: 105 MG/DL (ref 70–130)
GLUCOSE BLDC GLUCOMTR-MCNC: 127 MG/DL (ref 70–130)
GLUCOSE BLDC GLUCOMTR-MCNC: 193 MG/DL (ref 70–130)
GLUCOSE BLDC GLUCOMTR-MCNC: 272 MG/DL (ref 70–130)
GLUCOSE SERPL-MCNC: 104 MG/DL (ref 65–99)
HCT VFR BLD AUTO: 28.1 % (ref 37.5–51)
HGB BLD-MCNC: 9.2 G/DL (ref 13–17.7)
IMM GRANULOCYTES # BLD AUTO: 0.09 10*3/MM3 (ref 0–0.05)
IMM GRANULOCYTES NFR BLD AUTO: 1 % (ref 0–0.5)
LYMPHOCYTES # BLD AUTO: 1.03 10*3/MM3 (ref 0.7–3.1)
LYMPHOCYTES NFR BLD AUTO: 10.9 % (ref 19.6–45.3)
MAGNESIUM SERPL-MCNC: 2.5 MG/DL (ref 1.6–2.4)
MCH RBC QN AUTO: 29.2 PG (ref 26.6–33)
MCHC RBC AUTO-ENTMCNC: 32.7 G/DL (ref 31.5–35.7)
MCV RBC AUTO: 89.2 FL (ref 79–97)
MONOCYTES # BLD AUTO: 1.01 10*3/MM3 (ref 0.1–0.9)
MONOCYTES NFR BLD AUTO: 10.7 % (ref 5–12)
NEUTROPHILS NFR BLD AUTO: 6.93 10*3/MM3 (ref 1.7–7)
NEUTROPHILS NFR BLD AUTO: 73.6 % (ref 42.7–76)
NRBC BLD AUTO-RTO: 0 /100 WBC (ref 0–0.2)
PHOSPHATE SERPL-MCNC: 4.5 MG/DL (ref 2.5–4.5)
PLATELET # BLD AUTO: 238 10*3/MM3 (ref 140–450)
PMV BLD AUTO: 10 FL (ref 6–12)
POTASSIUM SERPL-SCNC: 4 MMOL/L (ref 3.5–5.2)
PROT SERPL-MCNC: 5.2 G/DL (ref 6–8.5)
RBC # BLD AUTO: 3.15 10*6/MM3 (ref 4.14–5.8)
SODIUM SERPL-SCNC: 142 MMOL/L (ref 136–145)
WBC NRBC COR # BLD AUTO: 9.42 10*3/MM3 (ref 3.4–10.8)
YEAST SPEC QL CULT: NORMAL

## 2024-05-04 PROCEDURE — 82533 TOTAL CORTISOL: CPT | Performed by: INTERNAL MEDICINE

## 2024-05-04 PROCEDURE — 82948 REAGENT STRIP/BLOOD GLUCOSE: CPT

## 2024-05-04 PROCEDURE — 25010000002 HYDROCORTISONE SOD SUC (PF) 100 MG RECONSTITUTED SOLUTION: Performed by: INTERNAL MEDICINE

## 2024-05-04 PROCEDURE — 83735 ASSAY OF MAGNESIUM: CPT | Performed by: INTERNAL MEDICINE

## 2024-05-04 PROCEDURE — 25010000002 ALBUMIN HUMAN 25% PER 50 ML: Performed by: INTERNAL MEDICINE

## 2024-05-04 PROCEDURE — 85025 COMPLETE CBC W/AUTO DIFF WBC: CPT | Performed by: INTERNAL MEDICINE

## 2024-05-04 PROCEDURE — 80053 COMPREHEN METABOLIC PANEL: CPT | Performed by: INTERNAL MEDICINE

## 2024-05-04 PROCEDURE — 63710000001 INSULIN LISPRO (HUMAN) PER 5 UNITS: Performed by: INTERNAL MEDICINE

## 2024-05-04 PROCEDURE — 25010000002 FUROSEMIDE PER 20 MG: Performed by: INTERNAL MEDICINE

## 2024-05-04 PROCEDURE — 63710000001 INSULIN GLARGINE PER 5 UNITS: Performed by: FAMILY MEDICINE

## 2024-05-04 PROCEDURE — P9047 ALBUMIN (HUMAN), 25%, 50ML: HCPCS | Performed by: INTERNAL MEDICINE

## 2024-05-04 PROCEDURE — 99232 SBSQ HOSP IP/OBS MODERATE 35: CPT | Performed by: INTERNAL MEDICINE

## 2024-05-04 PROCEDURE — 84100 ASSAY OF PHOSPHORUS: CPT | Performed by: INTERNAL MEDICINE

## 2024-05-04 RX ORDER — ALBUMIN (HUMAN) 12.5 G/50ML
25 SOLUTION INTRAVENOUS
Qty: 400 ML | Refills: 0 | Status: COMPLETED | OUTPATIENT
Start: 2024-05-04 | End: 2024-05-04

## 2024-05-04 RX ORDER — FLUDROCORTISONE ACETATE 0.1 MG/1
100 TABLET ORAL DAILY
Status: DISCONTINUED | OUTPATIENT
Start: 2024-05-04 | End: 2024-05-06

## 2024-05-04 RX ADMIN — APIXABAN 2.5 MG: 2.5 TABLET, FILM COATED ORAL at 08:18

## 2024-05-04 RX ADMIN — APIXABAN 2.5 MG: 2.5 TABLET, FILM COATED ORAL at 21:20

## 2024-05-04 RX ADMIN — Medication 10 ML: at 08:19

## 2024-05-04 RX ADMIN — HYDROCORTISONE SODIUM SUCCINATE 100 MG: 100 INJECTION, POWDER, FOR SOLUTION INTRAMUSCULAR; INTRAVENOUS at 09:51

## 2024-05-04 RX ADMIN — INSULIN LISPRO 6 UNITS: 100 INJECTION, SOLUTION INTRAVENOUS; SUBCUTANEOUS at 21:19

## 2024-05-04 RX ADMIN — ACETAMINOPHEN 650 MG: 325 TABLET ORAL at 21:19

## 2024-05-04 RX ADMIN — ASPIRIN 81 MG CHEWABLE TABLET 81 MG: 81 TABLET CHEWABLE at 08:18

## 2024-05-04 RX ADMIN — MIDODRINE HYDROCHLORIDE 10 MG: 10 TABLET ORAL at 05:29

## 2024-05-04 RX ADMIN — HYDROCORTISONE SODIUM SUCCINATE 50 MG: 100 INJECTION, POWDER, FOR SOLUTION INTRAMUSCULAR; INTRAVENOUS at 21:20

## 2024-05-04 RX ADMIN — LEVOTHYROXINE SODIUM 88 MCG: 88 TABLET ORAL at 05:29

## 2024-05-04 RX ADMIN — Medication 1 APPLICATION: at 08:18

## 2024-05-04 RX ADMIN — Medication 1 CAPSULE: at 08:18

## 2024-05-04 RX ADMIN — ATORVASTATIN CALCIUM 20 MG: 20 TABLET, FILM COATED ORAL at 21:20

## 2024-05-04 RX ADMIN — NOREPINEPHRINE BITARTRATE 0.02 MCG/KG/MIN: 0.03 INJECTION, SOLUTION INTRAVENOUS at 21:45

## 2024-05-04 RX ADMIN — INSULIN LISPRO 2 UNITS: 100 INJECTION, SOLUTION INTRAVENOUS; SUBCUTANEOUS at 16:51

## 2024-05-04 RX ADMIN — ALBUMIN (HUMAN) 25 G: 0.25 INJECTION, SOLUTION INTRAVENOUS at 09:51

## 2024-05-04 RX ADMIN — ACETAMINOPHEN 650 MG: 325 TABLET ORAL at 02:53

## 2024-05-04 RX ADMIN — Medication 10 ML: at 21:20

## 2024-05-04 RX ADMIN — FUROSEMIDE 80 MG: 10 INJECTION, SOLUTION INTRAMUSCULAR; INTRAVENOUS at 08:18

## 2024-05-04 RX ADMIN — PANTOPRAZOLE SODIUM 40 MG: 40 TABLET, DELAYED RELEASE ORAL at 05:29

## 2024-05-04 RX ADMIN — MIDODRINE HYDROCHLORIDE 10 MG: 10 TABLET ORAL at 14:09

## 2024-05-04 RX ADMIN — TAMSULOSIN HYDROCHLORIDE 0.4 MG: 0.4 CAPSULE ORAL at 08:18

## 2024-05-04 RX ADMIN — HYDROCORTISONE SODIUM SUCCINATE 50 MG: 100 INJECTION, POWDER, FOR SOLUTION INTRAMUSCULAR; INTRAVENOUS at 16:05

## 2024-05-04 RX ADMIN — MIDODRINE HYDROCHLORIDE 10 MG: 10 TABLET ORAL at 21:20

## 2024-05-04 RX ADMIN — FLUDROCORTISONE ACETATE 100 MCG: 0.1 TABLET ORAL at 16:51

## 2024-05-04 RX ADMIN — ALBUMIN (HUMAN) 25 G: 0.25 INJECTION, SOLUTION INTRAVENOUS at 10:30

## 2024-05-04 RX ADMIN — INSULIN GLARGINE 8 UNITS: 100 INJECTION, SOLUTION SUBCUTANEOUS at 21:20

## 2024-05-04 NOTE — PROGRESS NOTES
INFECTIOUS DISEASE Progress Note    Jermaine Singh  1945  [id number]    Admission Date: 4/22/2024      Requesting Provider: No ref. provider found  Evaluating Physician: Jf Padilla MD    Reason for Consultation:  UTI    History of present illness:    4/23/24: Patient is a 79 y.o. male  with a history of type 2 diabetes mellitus, peripheral neuropathy, stage III chronic kidney disease, bilateral DVTs, paroxysmal atrial fibrillation, sick sinus syndrome status post pacemaker placement, CHF, peripheral vascular disease, and left diabetic foot infection who is seen today for evaluation of UTI.  He had a history of UTI last month with urine cultures and early March growing yeast.  He was apparently treated with an antifungal agent.    Over the last few days he noted the onset of profound weakness and fatigue.  He also noticed some dysuria.  He was brought to the emergency room and found to have pyuria and bacteriuria with leukocytosis.   Urine culture was sent and he was started on ceftriaxone.   His white blood cell count today is 11.8.   He was noted to have a blister over his right fifth metatarsal head and a chronic right pretibial wound.    4/24/24: He remains afebrile.  His urine culture was finalized as negative.  I have asked the laboratory to hold the urine culture for yeast but they did not and it was disposed of last night.   He states that his dysuria is improved today.  Feels better.     4/25/2024:  He remains afebrile.  White blood cell count today is 6.7.  Creatinine is 1.99.   Urinalysis yesterday revealed too numerous to count white blood cells.  He denied dysuria this morning.  After I saw him he suffered a V-fib cardiac arrest and was transferred to the ICU.  He is endotracheally intubated    4/26/24:   His maximum temperature over the last 24 hours is 99.5.  Repeat urine culture from 4/24 is pending.  He remains on ventilatory support.    Creatinine is 2.28.  ALT is  1028.  White blood  cell count is 9.95.  O2 saturation is 100% on an FiO2 of 30%.  Chest x-ray reveals no pulmonary infiltrates. \    Later this morning he was extubated.  He is confused.  He knows his name and his birthdate.  He cannot answer more complex questions.    4/27/2024:  His maximum temperature over the last 24 hours is 99.5.  His left ventricular ejection fraction is less than 20%.  His white blood cell count is 2.37.  ALT is down to 626.   Chest x-ray 4/27 reveals moderate hemoglobin airspace disease.   O2 saturation is 99% on 4 L.   He is on Zosyn therapy.   Later today his oxygen demand increased and he was placed   On BiPAP.     4/28/2024:   He has remained afebrile.   White blood cell count is 10.1. His creatinine is 2.65 ALT is 450.  His oxygen demand is down to 1 liter today. Chest x-ray today reveals interval improvement in the right upper lobe infiltrate.  He is coughing and has some sputum production.  He remains confused and did not recognize his wife and daughter today      4/29/2024: He remains afebrile.  His creatinine is 2.52.  ALT is 320.  White blood cell count is 9.3.    He is now on 1 L with an O2 saturation of 96%.  He is more alert and responsive today.  He is less confused.     4/30/2024: He has remained afebrile.  Creatinine is 2.68.  ALT is 218.   White blood cell count is 9.1.  room air. room air.  His confusion is improved today.  He recognized his wife today.  He denies increased cough and sputum production.  He knows me by name today.    5/1/2024:  He remains afebrile.  His creatinine today is 3.39.   White blood cell count is 8.9. O2 saturation is 97% on room air.      5/2/2024:  He remains afebrile.  His creatinine today is 3.3.  White blood cell count is 10.7.   Urine eosinophil smear was 0. He has continued to require vasopressor therapy.   Echocardiogram from yesterday reveals a 21-25% ejection fraction.   He feels better today.  His daughter indicates that  is cognitive function has  improved and he is no longer confused.    5/3/2024:  Maximum temperature over the last 24 hours is 99.4.  Creatinine is 3.38.    ALT is 106.   white blood cell count is 10.4.  O2 saturation is 94% on room air.  He denies increased dyspnea.  He is still requiring low-dose vasopressor support.    5/4/24: History reviewed.  I am following the patient for Dr. Lincoln Padilla over the weekend. The patient denies any worsening shortness of breath.  Breathing is nonlabored on room air.  He has not had any fevers.  He is now off of antibiotics since 5/1.  Creatinine was downtrending to 2.95, from 3.38.  White blood count is normal at 9.42.  He denies any nausea, vomiting, or diarrhea.  He denies peripheral edema.  Right lower extremity with Unna boot in place.    Past Medical History:   Diagnosis Date    Allergic     Arthritis     Asthma     Coronary artery disease     Diabetes mellitus 2000    started on inuslin 12/2018; started on po meds in 2000; checking blood sugars daily     Diabetic foot infection 07/23/2023    Disease of thyroid gland     po meds daily for hypothyroidism     Elevated serum creatinine 11/15/2022    Elevated troponin 10/02/2022    Generalized weakness 11/15/2022    History of fracture as a child     rt leg- severe     Hyperlipidemia     Hypertension     Hypothyroidism     PAF (paroxysmal atrial fibrillation) 07/23/2023    Peripheral neuropathy     Peripheral vascular disease     s/p angiogram 2/19-needs stent in left leg     Pleural effusion, bilateral 11/15/2022    Proximal phalanx fracture of the second digit extending into the second metatarsal joint 07/28/2022    RBBB     Right knee pain     Status post amputation of great toe and second toe of left foot 07/23/2023    Status post amputation of great toe, left 11/15/2022    Unstable angina 02/12/2019    Added automatically from request for surgery [id number]    Vitamin D deficiency        Past Surgical History:   Procedure Laterality Date     AMPUTATION DIGIT Left 01/17/2023    Procedure: AMPUTATION DIGIT LEFT;  Surgeon: Gopal Márquez MD;  Location:  [facility];  Service: Vascular;  Laterality: Left;    APPENDECTOMY      CARDIAC CATHETERIZATION N/A 02/14/2019    Procedure: Left Heart Cath;  Surgeon: Cooper Apodaca MD;  Location:  [facility];  Service: Cardiology    CARDIAC ELECTROPHYSIOLOGY PROCEDURE N/A 05/18/2022    Procedure: DEVICE IMPLANT;  Surgeon: Cooper Apodaca MD;  Location:  [facility];  Service: Cardiology;  Laterality: N/A;    CARDIAC SURGERY      COLONOSCOPY      CORONARY ARTERY BYPASS GRAFT N/A 03/22/2019    Procedure: MEDIAN STERNOTOMY, CORONARY ARTERY BYPASS GRAFT X3, UTILIZING THE LEFT INTERNAL MAMMARY ARTERY, EVH AND OPEN HARVEST OF THE RIGHT GREATER SAPHENOUS VEIN, EXPLORATION OF THE LEFT LEG;  Surgeon: Ap Au MD;  Location:  [facility];  Service: Cardiothoracic    EYE SURGERY Bilateral     cataracts     FRACTURE SURGERY      INTERVENTIONAL RADIOLOGY PROCEDURE N/A 07/29/2021    Procedure: Abdominal Aortagram with Runoff;  Surgeon: Jermaine Hernandez MD;  Location:  [facility];  Service: Cardiovascular;  Laterality: N/A;    KNEE ARTHROSCOPY      right x 2, left x 1    LACERATION REPAIR      right leg    LEG SURGERY      2 for fracture of rt leg     TONSILLECTOMY      Adnoidectomy    TRANS METATARSAL AMPUTATION Left 08/02/2022    Procedure: GREAT TOE AMPUTATION LEFT;  Surgeon: Gopal Márquez MD;  Location:  [facility];  Service: Vascular;  Laterality: Left;       Family History   Problem Relation Age of Onset    Coronary artery disease Mother     Diabetes Mother     Hyperlipidemia Mother     Cancer Father        Social History     Socioeconomic History    Marital status:     Number of children: 2   Tobacco Use    Smoking status: Never     Passive exposure: Past    Smokeless tobacco: Never   Vaping Use    Vaping status: Never Used   Substance and Sexual  Activity    Alcohol use: Not Currently     Comment: every 2-3 months    Drug use: No    Sexual activity: Not Currently     Partners: Female       Allergies   Allergen Reactions    Vancomycin Other (See Comments)     Kidney failure per last admission         Medication:    Current Facility-Administered Medications:     acetaminophen (TYLENOL) tablet 650 mg, 650 mg, Oral, Q6H PRN, Maricel Peraza, APRN, 650 mg at 05/04/24 0253    albuterol (PROVENTIL) nebulizer solution 0.083% 2.5 mg/3mL, 2.5 mg, Nebulization, BID PRN, Nathalia Mckeon DNP, APRN, 2.5 mg at 04/30/24 0755    apixaban (ELIQUIS) tablet 2.5 mg, 2.5 mg, Oral, Q12H, Antonio Angeles MD, 2.5 mg at 05/04/24 0818    aspirin chewable tablet 81 mg, 81 mg, Oral, Daily, Maricel Peraza APRN, 81 mg at 05/04/24 0818    atorvastatin (LIPITOR) tablet 20 mg, 20 mg, Oral, Nightly, Golden Madison MD, 20 mg at 05/03/24 2053    castor oil-balsam peru (VENELEX) ointment 1 Application, 1 Application, Topical, Daily, Eyad Ledesma MD, 1 Application at 05/04/24 0818    dextrose (D50W) (25 g/50 mL) IV injection 25 g, 25 g, Intravenous, Q15 Min PRN, Maricel Peraza, APRN    fludrocortisone tablet 100 mcg, 100 mcg, Oral, Daily, Jose Ramon Murphy MD, 100 mcg at 05/04/24 1651    furosemide (LASIX) injection 80 mg, 80 mg, Intravenous, Daily, Blake Tilley MD, 80 mg at 05/04/24 0818    glucagon (GLUCAGEN) injection 1 mg, 1 mg, Intramuscular, Q15 Min PRN, Maricel Peraza, APRN    HYDROcodone-acetaminophen (NORCO) 5-325 MG per tablet 1 tablet, 1 tablet, Oral, Q4H PRN, Antonio Angeles MD, 1 tablet at 05/03/24 1501    [COMPLETED] Hydrocortisone Sod Suc (PF) (Solu-CORTEF) injection 100 mg, 100 mg, Intravenous, Once, 100 mg at 05/04/24 0951 **FOLLOWED BY** Hydrocortisone Sod Suc (PF) (Solu-CORTEF) injection 50 mg, 50 mg, Intravenous, Q6H, Jose Ramon Murphy MD, 50 mg at 05/04/24 1605    insulin glargine (LANTUS, SEMGLEE) injection 8 Units, 8 Units, Subcutaneous,  Nightly, Luz Elena Batista DO, 8 Units at 05/03/24 2052    Insulin Lispro (humaLOG) injection 2-9 Units, 2-9 Units, Subcutaneous, 4x Daily AC & at Bedtime, Antonio Angeles MD, 2 Units at 05/04/24 1651    levothyroxine (SYNTHROID, LEVOTHROID) tablet 88 mcg, 88 mcg, Oral, Q AM, Maricel Peraza APRN, 88 mcg at 05/04/24 0529    Magnesium Low Dose Replacement - Follow Nurse / BPA Driven Protocol, , Does not apply, PRN, Maricel Peraza APRN    midodrine (PROAMATINE) tablet 10 mg, 10 mg, Oral, TID AC, Golden Madison MD, 10 mg at 05/04/24 1409    nitroglycerin (NITROSTAT) SL tablet 0.4 mg, 0.4 mg, Sublingual, Q5 Min PRN, Maricel Peraza APRN    norepinephrine (LEVOPHED) 8 mg in 250 mL NS infusion (premix), 0.02-0.3 mcg/kg/min, Intravenous, Titrated, Eyad Ledesma MD, Last Rate: 1.46 mL/hr at 05/04/24 1608, 0.01 mcg/kg/min at 05/04/24 1608    pantoprazole (PROTONIX) EC tablet 40 mg, 40 mg, Oral, Q AM, Antonio Angeles MD, 40 mg at 05/04/24 0529    polyethylene glycol (MIRALAX) packet 17 g, 17 g, Oral, Daily, WestArtur MD, 17 g at 05/02/24 1037    prochlorperazine (COMPAZINE) injection 5 mg, 5 mg, Intravenous, Q6H PRN, Maricel Peraza APRN, 2.5 mg at 04/26/24 1316    sennosides-docusate (PERICOLACE) 8.6-50 MG per tablet 2 tablet, 2 tablet, Oral, BID, 2 tablet at 05/02/24 2107 **AND** [DISCONTINUED] polyethylene glycol (MIRALAX) packet 17 g, 17 g, Oral, Daily PRN, 17 g at 04/30/24 2038 **AND** [DISCONTINUED] bisacodyl (DULCOLAX) EC tablet 5 mg, 5 mg, Oral, Daily PRN, 5 mg at 05/01/24 0956 **AND** [DISCONTINUED] bisacodyl (DULCOLAX) suppository 10 mg, 10 mg, Rectal, Daily PRN, Antonio Angeles MD    sodium chloride 0.9 % flush 10 mL, 10 mL, Intravenous, PRN, Case, Hailee V., DO    sodium chloride 0.9 % flush 10 mL, 10 mL, Intravenous, Q12H, Case, Hailee V., DO, 10 mL at 05/04/24 0819    tamsulosin (FLOMAX) 24 hr capsule 0.4 mg, 0.4 mg, Oral, Daily, Maricel Peraza, APRN,  0.4 mg at 24 0818    Antibiotics:  Anti-Infectives (From admission, onward)      Ordered     Dose/Rate Route Frequency Start Stop    24 1356  piperacillin-tazobactam (ZOSYN) 3.375 g IVPB in 100 mL NS MBP (CD)        Ordering Provider: Eyad Ledesma MD    3.375 g  over 30 Minutes Intravenous Once 24 1445 24 1557              Review of Systems:  See HPI      Physical Exam:   Vital Signs  Temp (24hrs), Av.5 °F (36.9 °C), Min:98.1 °F (36.7 °C), Max:99.1 °F (37.3 °C)    Temp  Min: 98.1 °F (36.7 °C)  Max: 99.1 °F (37.3 °C)  BP  Min: 82/64  Max: 117/56  Pulse  Min: 66  Max: 87  Resp  Min: 16  Max: 18  SpO2  Min: 93 %  Max: 99 %    GENERAL:   Alert and responsive. Sitting up in bed  HEENT: Normocephalic, atraumatic.  No external oral lesions noted  NECK: Supple   HEART:  irregular rate, No murmur   LUNGS:     Clear to auscultation bilaterally.  Nonlabored breathing on room air  ABDOMEN: Soft, nontender, nondistended. No rebound or guarding. NO mass or HSM.  EXT: His right distal leg is in an  Unna boot.   :   Fonseca catheter in place. Yellow urine collection container  MSK: No joint effusions or erythema  SKIN: No generalized rashes noted.  No peripheral stigmata of infective endocarditis noted.  NEURO:  Awake alert and oriented.  Normal speech and cognition.    Right upper extremity PICC line site is without any erythema    Laboratory Data    Results from last 7 days   Lab Units 24  0528 24  0441 24  0433   WBC 10*3/mm3 9.42 10.37 10.65   HEMOGLOBIN g/dL 9.2* 8.9* 9.0*   HEMATOCRIT % 28.1* 27.3* 26.8*   PLATELETS 10*3/mm3 238 251 252     Results from last 7 days   Lab Units 24  0528   SODIUM mmol/L 142   POTASSIUM mmol/L 4.0   CHLORIDE mmol/L 110*   CO2 mmol/L 20.0*   BUN mg/dL 72*   CREATININE mg/dL 2.95*   GLUCOSE mg/dL 104*   CALCIUM mg/dL 8.0*     Results from last 7 days   Lab Units 24  0528   ALK PHOS U/L 77   BILIRUBIN mg/dL 0.4   ALT (SGPT) U/L 84*  "  AST (SGOT) U/L 12                         Estimated Creatinine Clearance: 25.4 mL/min (A) (by C-G formula based on SCr of 2.95 mg/dL (H)).      Microbiology:  No results found for: \"ACANTHNAEG\", \"AFBCX\", \"BPERTUSSISCX\", \"BLOODCX\"  No results found for: \"BCIDPCR\", \"CXREFLEX\", \"CSFCX\", \"CULTURETIS\"  No results found for: \"CULTURES\", \"HSVCX\", \"URCX\"  No results found for: \"EYECULTURE\", \"GCCX\", \"HSVCULTURE\", \"LABHSV\"  No results found for: \"LEGIONELLA\", \"MRSACX\", \"MUMPSCX\", \"MYCOPLASCX\"  No results found for: \"NOCARDIACX\", \"STOOLCX\"  Urine Culture   Date Value Ref Range Status   04/22/2024 No growth  Preliminary     No results found for: \"VIRALCULTU\", \"WOUNDCX\"        Radiology:  Imaging Results (Last 72 Hours)       Procedure Component Value Units Date/Time    SLP FEES - Fiberoptic Endo Eval Swallow [[id number]] Resulted: 05/02/24 1100     Updated: 05/02/24 1100    Narrative:      This procedure was auto-finalized with no dictation required.        I independently read his recent chest x-rays      Impression:    1.    Right upper lobe aspiration pneumonia-he is S/P Zosyn therapy.  He is clinically and radiographically improved.  Zosyn was discontinued on 5/1   2.  V-fib arrest-status post resuscitation.    3.   Acute kidney injury-superimposed on stage IIIb chronic kidney disease.   4.  Chronic right pretibial dermatitis-without evidence of cellulitis  5.  Type 2 diabetes mellitus  6.  coronary artery disease/status post CABG, 3/22/2019  7.  Severe systolic heart failure- with some improvement on his echocardiogram of 5/1.  8.  Stage IIIb chronic kidney disease  9.  Paroxysmal atrial fibrillation  10.  Acute hypoxic respiratory failure- improved   11.  Ischemic hepatitis- improved  12.  Pyuria- with a negative urine culture.  13.  Anoxic encephalopathy- improved  14.  Right fifth metatarsal wound/bullous lesion    PLAN/RECOMMENDATIONS:   1.  Continue with a right leg Unna boot  2.  Continue off of antibiotic " therapy  3.  Diuretic and vasopressor therapy per  intensivist service.    I reviewed the patient's labs, recent micro, recent radiographs, and medications today.    His overall prognosis is extremely poor due to his severe underlying systolic congestive heart failure.   He may be transitioning to hospice.  I discussed with Dr. Murphy today    Jf Padilla MD  5/4/2024  17:15 EDT

## 2024-05-04 NOTE — PROGRESS NOTES
" LOS: 11 days   Patient Care Team:  Marysol Shrestha MD as PCP - General (Internal Medicine)  Cooper Apodaca MD as Consulting Physician (Cardiology)    Chief Complaint: [place] on CKD stage III  Cardiorenal syndrome.     Subjective     Good response to diuretics. UOP ~ 2.6 liter. Net fluid balance ~ -2 liter. Some improvement in renal function noted. Otherwise clinical status is same.Appetite is poor. On low dose levophed.    Subjective:  Symptoms:  Stable.  No shortness of breath, chest pain or chest pressure.    Diet:  Adequate intake.  No nausea or vomiting.        History taken from: patient    Objective     Vital Sign Min/Max for last 24 hours  Temp  Min: 98.1 °F (36.7 °C)  Max: 99.1 °F (37.3 °C)   BP  Min: 83/52  Max: 117/56   Pulse  Min: 65  Max: 87   Resp  Min: 16  Max: 18   SpO2  Min: 92 %  Max: 99 %   No data recorded   Weight  Min: 88.6 kg (195 lb 5.2 oz)  Max: 88.6 kg (195 lb 5.2 oz)     Flowsheet Rows      Flowsheet Row First Filed Value   Admission Height 190.5 cm (75\") Documented at 04/22/2024 1438   Admission Weight 77.1 kg (170 lb) Documented at 04/22/2024 1438            I/O this shift:  In: 100 [IV Piggyback:100]  Out: 700 [Urine:700]  I/O last 3 completed shifts:  In: 663.8 [P.O.:480; I.V.:183.8]  Out: 3855 [Urine:3855]    Objective:  General Appearance:  Comfortable.    Vital signs: (most recent): Blood pressure 106/71, pulse 82, temperature 98.4 °F (36.9 °C), temperature source Bladder, resp. rate 16, height 190 cm (74.8\"), weight 88.6 kg (195 lb 5.2 oz), SpO2 98%.  Vital signs are normal.  No fever.    Output: Producing urine.    HEENT: Normal HEENT exam.    Lungs:  Normal effort and normal respiratory rate.  Breath sounds clear to auscultation.  He is not in respiratory distress.  No decreased breath sounds or wheezes.    Heart: Normal rate.  Regular rhythm.  S1 normal and S2 normal.  No gallop.   Abdomen: Abdomen is soft.  Bowel sounds are normal.     Extremities: There is no dependent " "edema or local swelling.    Pulses: Distal pulses are intact.    Neurological: Patient is alert and oriented to person, place and time.  Normal strength.    Pupils:  Pupils are equal, round, and reactive to light.    Skin:  Pale.                Results Review:     I reviewed the patient's new clinical results.    WBC WBC   Date Value Ref Range Status   05/04/2024 9.42 3.40 - 10.80 10*3/mm3 Final   05/03/2024 10.37 3.40 - 10.80 10*3/mm3 Final   05/02/2024 10.65 3.40 - 10.80 10*3/mm3 Final      HGB Hemoglobin   Date Value Ref Range Status   05/04/2024 9.2 (L) 13.0 - 17.7 g/dL Final   05/03/2024 8.9 (L) 13.0 - 17.7 g/dL Final   05/02/2024 9.0 (L) 13.0 - 17.7 g/dL Final      HCT Hematocrit   Date Value Ref Range Status   05/04/2024 28.1 (L) 37.5 - 51.0 % Final   05/03/2024 27.3 (L) 37.5 - 51.0 % Final   05/02/2024 26.8 (L) 37.5 - 51.0 % Final      Platlets No results found for: \"LABPLAT\"   MCV MCV   Date Value Ref Range Status   05/04/2024 89.2 79.0 - 97.0 fL Final   05/03/2024 90.7 79.0 - 97.0 fL Final   05/02/2024 88.4 79.0 - 97.0 fL Final          Sodium Sodium   Date Value Ref Range Status   05/04/2024 142 136 - 145 mmol/L Final   05/03/2024 140 136 - 145 mmol/L Final   05/02/2024 136 136 - 145 mmol/L Final      Potassium Potassium   Date Value Ref Range Status   05/04/2024 4.0 3.5 - 5.2 mmol/L Final   05/03/2024 4.2 3.5 - 5.2 mmol/L Final   05/02/2024 4.4 3.5 - 5.2 mmol/L Final      Chloride Chloride   Date Value Ref Range Status   05/04/2024 110 (H) 98 - 107 mmol/L Final   05/03/2024 109 (H) 98 - 107 mmol/L Final   05/02/2024 107 98 - 107 mmol/L Final      CO2 CO2   Date Value Ref Range Status   05/04/2024 20.0 (L) 22.0 - 29.0 mmol/L Final   05/03/2024 18.0 (L) 22.0 - 29.0 mmol/L Final   05/02/2024 17.0 (L) 22.0 - 29.0 mmol/L Final      BUN BUN   Date Value Ref Range Status   05/04/2024 72 (H) 8 - 23 mg/dL Final   05/03/2024 79 (H) 8 - 23 mg/dL Final   05/02/2024 82 (H) 8 - 23 mg/dL Final      Creatinine " "Creatinine   Date Value Ref Range Status   05/04/2024 2.95 (H) 0.76 - 1.27 mg/dL Final   05/03/2024 3.38 (H) 0.76 - 1.27 mg/dL Final   05/02/2024 3.30 (H) 0.76 - 1.27 mg/dL Final      Calcium Calcium   Date Value Ref Range Status   05/04/2024 8.0 (L) 8.6 - 10.5 mg/dL Final   05/03/2024 7.9 (L) 8.6 - 10.5 mg/dL Final   05/02/2024 7.8 (L) 8.6 - 10.5 mg/dL Final      PO4 No results found for: \"CAPO4\"   Albumin Albumin   Date Value Ref Range Status   05/04/2024 2.8 (L) 3.5 - 5.2 g/dL Final   05/03/2024 2.8 (L) 3.5 - 5.2 g/dL Final   05/02/2024 2.8 (L) 3.5 - 5.2 g/dL Final      Magnesium Magnesium   Date Value Ref Range Status   05/04/2024 2.5 (H) 1.6 - 2.4 mg/dL Final   05/02/2024 2.8 (H) 1.6 - 2.4 mg/dL Final      Uric Acid No results found for: \"URICACID\"     Medication Review: Yes    Assessment & Plan       Cardiac arrest    Type 2 diabetes mellitus with hyperglycemia    Hyperlipidemia LDL goal <70    Hypothyroidism (acquired)    Coronary artery disease involving native coronary artery of native heart with angina pectoris    Pacemaker    HFrEF (heart failure with reduced ejection fraction)    Anemia, chronic disease    Stage 3b chronic kidney disease    UTI (urinary tract infection)    Acute respiratory failure with hypoxia    Elevated troponin level not due myocardial infarction      Assessment & Plan      [place] on CKD stage IIIb/IV:  Baseline cr 1.8-2.2 mg/dl. Recent cr trend 2.2>2.37>2.5>2.63>3.3. Etiology of [place] likely hemodynamic injury post vfib arrest vs decompensated cardiorenal syndrome. Taken off Zosyn due to concern for AIN. Urine eos 0%.         CKD stage III/IV: GFR baseline 28-32ml/min. Follows with Dr Pal. Risk factor for CKD: DM, HTN, PVD and CAD. Serological workup negative in the past. Including ANCA, antiGBM     Met acidosis: Due to [place] on CKD     Cardiogenic shock: Requiring pressor support. ECHO post arrest showed EF 20%. Hx of severe LV disease. Cardiology following. Repeat ECHO shows EF 21-25%. " Requiring pressor support to optimize blood pressure.    Volume status: On RA. Does have dependent edema LE b/l. Improved with diuretics.      Hx of recurrent UTI: Recently treated for fungal UTI. Urology and ID following      Hx of HTN: Renal artery duplex negative for WILMA in the past.      Recs  [place] on CKD stage IIIb/IV due to above mentioned risk factor. Repeat ECHO showed EF 21-25%. Given severe LAD disease and low EF patient is not a good candidate for long term dialysis. I discussed with patient continued medical management and optimization of blood pressure and volume status for now.   - Continue lasix 80mg IV daily.   - On midodrine 10mg TID in attempts to wean off pressor support.   - Unable to add GDMT for CHF. Not a candidate for ACEi/ARB/SGLT-2inh  - Target MAP 65 or above  - Strict I/o  - Dose meds to eGFR   - Hospice following to discuss GOC.           I discussed the patients findings and my recommendations with patient       Blake Tilley MD  05/04/24  11:47 EDT    Time:

## 2024-05-04 NOTE — PLAN OF CARE
Goal Outcome Evaluation:      No acute events this shift. Levo continued and albumin given. VSS. UOP adequate. BM this shift. Family updated at bedside.

## 2024-05-04 NOTE — PROGRESS NOTES
Intensive Care Follow-up     Hospital:  LOS: 11 days   Mr. Jermaine Singh, 79 y.o. male is followed for:   Cardiac arrest            Subjective     Interval History:    Uneventful night.    Still on low dose Norepi this morning.    On ambient air.    Temp  Min: 98.1 °F (36.7 °C)  Max: 99.1 °F (37.3 °C)      The patient's past medical, surgical and social history were reviewed and updated in Epic as appropriate.      History     Last Reviewed by Jose Ramon Murphy MD on 2024 at  3:14 PM    Sections Reviewed    Medical, Surgical, Family, Tobacco, Alcohol, Drug Use, Sexual Activity,   Social Documentation    Problem list reviewed by Jose Ramon Murphy MD on 2024 at  3:14 PM  Medicines reviewed by Jose Ramon Murphy MD on 2024 at  3:14 PM  Allergies reviewed by Jose Ramon Murphy MD on 2024 at  3:14 PM        Objective     Vitals:  Temp: 98.1 °F (36.7 °C) (24 1200) Temp  Min: 98.1 °F (36.7 °C)  Max: 99.1 °F (37.3 °C)   Temp core:      BP: 96/65 (24 1400) BP  Min: 86/59  Max: 117/56   MAP (non-invasive) Noninvasive MAP (mmHg): 74 (24 1400) Noninvasive MAP (mmHg)  Av.9  Min: 47  Max: 137   Pulse: 87 (24 1400) Pulse  Min: 66  Max: 87   Resp: 16 (24 1400) Resp  Min: 16  Max: 18   SpO2: 98 % (24 1400) SpO2  Min: 93 %  Max: 99 %   Device: room air (24 1400)    Flow Rate: 1 (24 0400) No data recorded         24  0448 24   Weight: 90.4 kg (199 lb 4.7 oz) 88.6 kg (195 lb 5.2 oz)        Intake/Ouptut 24 hrs (7:00AM - 6:59 AM)  Intake & Output (last 2 days)          0701   0700  0701   0700 05/04 0701  05/05 0700    P.O. 240 480     I.V. (mL/kg) 130.7 (1.4) 81.1 (0.9)     IV Piggyback   100    Total Intake(mL/kg) 370.7 (4.1) 561.1 (6.3) 100 (1.1)    Urine (mL/kg/hr) 1975 (0.9) 2605 (1.2) 1000 (1.4)    Stool 0 0 0    Total Output  2605 1000    Net -1604.3 -2043.9 -900           Stool Unmeasured Occurrence 1 x 3 x 1 x             Medications (drips):  norepinephrine, Last Rate: 0.02 mcg/kg/min (05/04/24 1000)         Physical Examination  Telemetry:  Rhythm: paced rhythm (05/04/24 1400)      Constitutional:  No acute distress.   Cardiovascular: RRR.    Respiratory: Normal breath sounds  No adventitious sounds   Abdominal:  Soft with no tenderness.   Extremities: No Edema   Neurological:   Alert, Oriented, Cooperative.    Best Eye Response: 4-->(E4) spontaneous (05/04/24 1400)  Best Motor Response: 6-->(M6) obeys commands (05/04/24 1400)  Best Verbal Response: 5-->(V5) oriented (05/04/24 1400)  Binta Coma Scale Score: 15 (05/04/24 1400)    RASS (Michelle Agitation-Sedation Scale): 0-->alert and calm (05/04/24 0600)    Confusion Assessment Method-ICU (CAM-ICU)  Feature 3: Altered Level of Consciousness: Negative (05/04/24 0600)     Results Reviewed:  Laboratory  Microbiology  Radiology  Pathology    Hematology:  Results from last 7 days   Lab Units 05/04/24 0528 05/03/24 0441 05/02/24  0433   WBC 10*3/mm3 9.42 10.37 10.65   HEMOGLOBIN g/dL 9.2* 8.9* 9.0*   MCV fL 89.2 90.7 88.4   PLATELETS 10*3/mm3 238 251 252     Results from last 7 days   Lab Units 05/04/24 0528 05/03/24 0441 05/02/24  0433   NEUTROS ABS 10*3/mm3 6.93 7.72* 7.58*   LYMPHS ABS 10*3/mm3 1.03 1.20 1.27   EOS ABS 10*3/mm3 0.30 0.35 0.49*     Chemistry:  Estimated Creatinine Clearance: 25.4 mL/min (A) (by C-G formula based on SCr of 2.95 mg/dL (H)).    Results from last 7 days   Lab Units 05/04/24 0528 05/03/24 0441   SODIUM mmol/L 142 140   POTASSIUM mmol/L 4.0 4.2   CHLORIDE mmol/L 110* 109*   CO2 mmol/L 20.0* 18.0*   BUN mg/dL 72* 79*   CREATININE mg/dL 2.95* 3.38*   GLUCOSE mg/dL 104* 110*     Results from last 7 days   Lab Units 05/04/24 0528 05/03/24  0441 05/02/24  0433   CALCIUM mg/dL 8.0* 7.9* 7.8*   MAGNESIUM mg/dL 2.5*  --  2.8*   PHOSPHORUS mg/dL 4.5  --  4.9*     Hepatic Panel:  Results from last 7 days   Lab Units 05/04/24 0528 05/03/24  0441  05/02/24  0433   ALBUMIN g/dL 2.8* 2.8* 2.8*   TOTAL PROTEIN g/dL 5.2* 5.3* 5.1*   BILIRUBIN mg/dL 0.4 0.4 0.4   AST (SGOT) U/L 12 13 21   ALT (SGPT) U/L 84* 106* 132*   ALK PHOS U/L 77 74 69     Images:  No radiology results for the last day    Echo:  Results for orders placed during the hospital encounter of 04/22/24    Adult Transthoracic Echo Complete W/ Cont if Necessary Per Protocol    Interpretation Summary    Left ventricular ejection fraction appears to be 21 - 25%.    The left atrial cavity is mildly dilated.    The right atrial cavity is mildly  dilated.    Estimated right ventricular systolic pressure from tricuspid regurgitation is normal (<35 mmHg). Calculated right ventricular systolic pressure from tricuspid regurgitation is 22 mmHg.    Akinetic anterior wall    MAC      Results: Reviewed.  I reviewed the patient's new laboratory and imaging results.  I independently reviewed the patient's new images.    Medications: Reviewed.        Assessment & Plan   Impression      Active Hospital Problems    Diagnosis  POA    **Cardiac arrest [I46.9]  Yes     Cardiac arrest requiring approximately 15 defibrillations, 4/25/2024  Echo (4/25/2024): LVEF <20%.  Dilated LV.      Acute respiratory failure with hypoxia [J96.01]  Yes    Elevated troponin level not due myocardial infarction [R79.89]  Yes    UTI (urinary tract infection) [N39.0]  Yes    Anemia, chronic disease [D63.8]  Yes    Stage 3b chronic kidney disease [N18.32]  Yes    HFrEF (heart failure with reduced ejection fraction) [I50.20]  Yes     Echo (8/11/2022): EF 48%.  No significant valvular abnormality  Echo (4/25/2024): LVEF <20%.  Dilated LV.      Pacemaker [Z95.0]  Yes     Status post [facility] pacemaker placement by Dr. Apodaca 5/18/2022      Type 2 diabetes mellitus with hyperglycemia [E11.65]  Yes    Hyperlipidemia LDL goal <70 [E78.5]  Yes    Hypothyroidism (acquired) [E03.9]  Yes    Coronary artery disease involving native coronary artery of  native heart with angina pectoris [I25.119]  Yes     Cardiac catheterization (2019): Severe multivessel disease with severely diffusely diseased LAD  CABG x3 by Pawan Au (3/2019): LIMA to LAD, SVG to OM, SVG to distal RCA  Nuclear stress (10/2022): No ischemia       Lab Results   Lab Value Date/Time    HGBA1C 8.50 (H) 04/23/2024 0550    HGBA1C 8.70 (H) 07/23/2023 2235     Results from last 7 days   Lab Units 05/04/24  1117 05/04/24  0716 05/03/24  1936 05/03/24  1716 05/03/24  1216 05/03/24  1211 05/03/24  0726 05/02/24 2011   GLUCOSE mg/dL 127 105 132* 124 89 88 123 151*       Jermaine Singh is a 79 y.o. male  with a history of  who presented to [facility] on 4/22/24 with weakness and a chronic wound on his RLE. UA was consistent with a UTI. He was started on Rocephin and admitted to Hospital Medicine.      On 4/25/24, he suffered a V Fib arrest and was intubated during CPR. He was transferred to the ICU. He was able to be extubated on 4/26/24. After extubation, his respiratory status was tenuous and family decided to make him a DNR/DNI.  Patient was seen by infectious disease and on antibiotics.  Also has been requiring norepinephrine at low-dose.  Midodrine has been started. Patient also developed ischemic hepatitis which seems to be recovering.    Repeat echocardiogram showed ejection fraction remains at 21 to 25%.  Patient also  remains hemodynamically unstable and unable to be weaned off norepinephrine.    Comorbidities: T2DM, Stage 3 CKD, bilateral DVTs, paroxysmal Afib, SSS s/p post-pacemaker placement, CHF, PVD, and left diabetic foot infection    Cortisol level today, only 9. R/O CIRCI. Start stress doses of mineralocorticoids. (Hydrocortisone and Fludrocortisone).  Albumin 25% to improve his intravascular compartment  Off antibiotics at present.  Goal: Glucose < 180 mg/dL.   Continue Levothyroxine TSH 1.94 this admission  Anticoagulation with APIxaban       MDM:    Problem(s) High due to: Acute or Chronic  illness or injury that may poses a threat to life or bodily function  Data: Moderate due to: Review of prior external records from each unique source, Review or results of each unique test, and Ordering of each unique test    Moderate

## 2024-05-04 NOTE — PLAN OF CARE
Problem: Adult Inpatient Plan of Care  Goal: Plan of Care Review  Outcome: Ongoing, Progressing  Flowsheets (Taken 5/4/2024 0616)  Progress: no change  Plan of Care Reviewed With: patient     Goal Outcome Evaluation:    No acute events overnight. No c/o nausea, SOA or anxiety. V Paced on monitor, tolerating RA, Afebrile. Still hypotensive requiring levo, unable to wean. UOP 1505mL, 2 incontinent BMs. A+O x 4. Minimal PO intake overnight. Tylenol PO given for mild pain.

## 2024-05-05 LAB
ALBUMIN SERPL-MCNC: 3.3 G/DL (ref 3.5–5.2)
ALBUMIN/GLOB SERPL: 1.3 G/DL
ALP SERPL-CCNC: 75 U/L (ref 39–117)
ALT SERPL W P-5'-P-CCNC: 65 U/L (ref 1–41)
ANION GAP SERPL CALCULATED.3IONS-SCNC: 15 MMOL/L (ref 5–15)
AST SERPL-CCNC: 11 U/L (ref 1–40)
BASOPHILS # BLD AUTO: 0 10*3/MM3 (ref 0–0.2)
BASOPHILS NFR BLD AUTO: 0 % (ref 0–1.5)
BILIRUB SERPL-MCNC: 0.5 MG/DL (ref 0–1.2)
BUN SERPL-MCNC: 71 MG/DL (ref 8–23)
BUN/CREAT SERPL: 25.3 (ref 7–25)
CALCIUM SPEC-SCNC: 8.2 MG/DL (ref 8.6–10.5)
CHLORIDE SERPL-SCNC: 109 MMOL/L (ref 98–107)
CO2 SERPL-SCNC: 18 MMOL/L (ref 22–29)
CREAT SERPL-MCNC: 2.81 MG/DL (ref 0.76–1.27)
DEPRECATED RDW RBC AUTO: 48.3 FL (ref 37–54)
EGFRCR SERPLBLD CKD-EPI 2021: 22.2 ML/MIN/1.73
EOSINOPHIL # BLD AUTO: 0 10*3/MM3 (ref 0–0.4)
EOSINOPHIL NFR BLD AUTO: 0 % (ref 0.3–6.2)
ERYTHROCYTE [DISTWIDTH] IN BLOOD BY AUTOMATED COUNT: 15.1 % (ref 12.3–15.4)
GLOBULIN UR ELPH-MCNC: 2.5 GM/DL
GLUCOSE BLDC GLUCOMTR-MCNC: 166 MG/DL (ref 70–130)
GLUCOSE BLDC GLUCOMTR-MCNC: 258 MG/DL (ref 70–130)
GLUCOSE BLDC GLUCOMTR-MCNC: 261 MG/DL (ref 70–130)
GLUCOSE BLDC GLUCOMTR-MCNC: 356 MG/DL (ref 70–130)
GLUCOSE SERPL-MCNC: 159 MG/DL (ref 65–99)
HCT VFR BLD AUTO: 27.6 % (ref 37.5–51)
HGB BLD-MCNC: 9 G/DL (ref 13–17.7)
IMM GRANULOCYTES # BLD AUTO: 0.05 10*3/MM3 (ref 0–0.05)
IMM GRANULOCYTES NFR BLD AUTO: 0.8 % (ref 0–0.5)
LARGE PLATELETS: NORMAL
LYMPHOCYTES # BLD AUTO: 0.53 10*3/MM3 (ref 0.7–3.1)
LYMPHOCYTES NFR BLD AUTO: 8 % (ref 19.6–45.3)
MCH RBC QN AUTO: 29.9 PG (ref 26.6–33)
MCHC RBC AUTO-ENTMCNC: 32.6 G/DL (ref 31.5–35.7)
MCV RBC AUTO: 91.7 FL (ref 79–97)
MONOCYTES # BLD AUTO: 0.2 10*3/MM3 (ref 0.1–0.9)
MONOCYTES NFR BLD AUTO: 3 % (ref 5–12)
NEUTROPHILS NFR BLD AUTO: 5.83 10*3/MM3 (ref 1.7–7)
NEUTROPHILS NFR BLD AUTO: 88.2 % (ref 42.7–76)
NRBC BLD AUTO-RTO: 0 /100 WBC (ref 0–0.2)
PLATELET # BLD AUTO: 187 10*3/MM3 (ref 140–450)
PMV BLD AUTO: 11.1 FL (ref 6–12)
POTASSIUM SERPL-SCNC: 4.1 MMOL/L (ref 3.5–5.2)
PROT SERPL-MCNC: 5.8 G/DL (ref 6–8.5)
RBC # BLD AUTO: 3.01 10*6/MM3 (ref 4.14–5.8)
RBC MORPH BLD: NORMAL
SODIUM SERPL-SCNC: 142 MMOL/L (ref 136–145)
WBC MORPH BLD: NORMAL
WBC NRBC COR # BLD AUTO: 6.61 10*3/MM3 (ref 3.4–10.8)

## 2024-05-05 PROCEDURE — 63710000001 INSULIN LISPRO (HUMAN) PER 5 UNITS: Performed by: INTERNAL MEDICINE

## 2024-05-05 PROCEDURE — 85007 BL SMEAR W/DIFF WBC COUNT: CPT | Performed by: INTERNAL MEDICINE

## 2024-05-05 PROCEDURE — 80053 COMPREHEN METABOLIC PANEL: CPT | Performed by: INTERNAL MEDICINE

## 2024-05-05 PROCEDURE — 63710000001 INSULIN GLARGINE PER 5 UNITS: Performed by: INTERNAL MEDICINE

## 2024-05-05 PROCEDURE — 25010000002 HYDROCORTISONE SOD SUC (PF) 100 MG RECONSTITUTED SOLUTION: Performed by: INTERNAL MEDICINE

## 2024-05-05 PROCEDURE — 99233 SBSQ HOSP IP/OBS HIGH 50: CPT | Performed by: INTERNAL MEDICINE

## 2024-05-05 PROCEDURE — 82948 REAGENT STRIP/BLOOD GLUCOSE: CPT

## 2024-05-05 PROCEDURE — 25010000002 FUROSEMIDE PER 20 MG: Performed by: INTERNAL MEDICINE

## 2024-05-05 PROCEDURE — 85025 COMPLETE CBC W/AUTO DIFF WBC: CPT | Performed by: INTERNAL MEDICINE

## 2024-05-05 RX ORDER — IBUPROFEN 600 MG/1
1 TABLET ORAL
Status: DISCONTINUED | OUTPATIENT
Start: 2024-05-05 | End: 2024-05-08 | Stop reason: HOSPADM

## 2024-05-05 RX ORDER — INSULIN LISPRO 100 [IU]/ML
1-200 INJECTION, SOLUTION INTRAVENOUS; SUBCUTANEOUS
Status: DISCONTINUED | OUTPATIENT
Start: 2024-05-05 | End: 2024-05-08 | Stop reason: HOSPADM

## 2024-05-05 RX ORDER — DEXTROSE MONOHYDRATE 25 G/50ML
10-50 INJECTION, SOLUTION INTRAVENOUS
Status: DISCONTINUED | OUTPATIENT
Start: 2024-05-05 | End: 2024-05-08 | Stop reason: HOSPADM

## 2024-05-05 RX ORDER — INSULIN LISPRO 100 [IU]/ML
1-200 INJECTION, SOLUTION INTRAVENOUS; SUBCUTANEOUS AS NEEDED
Status: DISCONTINUED | OUTPATIENT
Start: 2024-05-05 | End: 2024-05-08 | Stop reason: HOSPADM

## 2024-05-05 RX ORDER — NICOTINE POLACRILEX 4 MG
15 LOZENGE BUCCAL
Status: DISCONTINUED | OUTPATIENT
Start: 2024-05-05 | End: 2024-05-08 | Stop reason: HOSPADM

## 2024-05-05 RX ADMIN — INSULIN GLARGINE 13 UNITS: 100 INJECTION, SOLUTION SUBCUTANEOUS at 21:46

## 2024-05-05 RX ADMIN — HYDROCORTISONE SODIUM SUCCINATE 50 MG: 100 INJECTION, POWDER, FOR SOLUTION INTRAMUSCULAR; INTRAVENOUS at 17:50

## 2024-05-05 RX ADMIN — FUROSEMIDE 80 MG: 10 INJECTION, SOLUTION INTRAMUSCULAR; INTRAVENOUS at 10:07

## 2024-05-05 RX ADMIN — ACETAMINOPHEN 650 MG: 325 TABLET ORAL at 23:51

## 2024-05-05 RX ADMIN — INSULIN LISPRO 3 UNITS: 100 INJECTION, SOLUTION INTRAVENOUS; SUBCUTANEOUS at 21:46

## 2024-05-05 RX ADMIN — INSULIN LISPRO 6 UNITS: 100 INJECTION, SOLUTION INTRAVENOUS; SUBCUTANEOUS at 13:53

## 2024-05-05 RX ADMIN — HYDROCODONE BITARTRATE AND ACETAMINOPHEN 1 TABLET: 5; 325 TABLET ORAL at 10:11

## 2024-05-05 RX ADMIN — LEVOTHYROXINE SODIUM 88 MCG: 88 TABLET ORAL at 05:23

## 2024-05-05 RX ADMIN — HYDROCORTISONE SODIUM SUCCINATE 50 MG: 100 INJECTION, POWDER, FOR SOLUTION INTRAMUSCULAR; INTRAVENOUS at 21:46

## 2024-05-05 RX ADMIN — ATORVASTATIN CALCIUM 20 MG: 20 TABLET, FILM COATED ORAL at 21:46

## 2024-05-05 RX ADMIN — HYDROCORTISONE SODIUM SUCCINATE 50 MG: 100 INJECTION, POWDER, FOR SOLUTION INTRAMUSCULAR; INTRAVENOUS at 03:39

## 2024-05-05 RX ADMIN — FLUDROCORTISONE ACETATE 100 MCG: 0.1 TABLET ORAL at 10:11

## 2024-05-05 RX ADMIN — MIDODRINE HYDROCHLORIDE 10 MG: 10 TABLET ORAL at 05:23

## 2024-05-05 RX ADMIN — INSULIN LISPRO 4 UNITS: 100 INJECTION, SOLUTION INTRAVENOUS; SUBCUTANEOUS at 17:51

## 2024-05-05 RX ADMIN — APIXABAN 2.5 MG: 2.5 TABLET, FILM COATED ORAL at 10:11

## 2024-05-05 RX ADMIN — PANTOPRAZOLE SODIUM 40 MG: 40 TABLET, DELAYED RELEASE ORAL at 05:23

## 2024-05-05 RX ADMIN — HYDROCORTISONE SODIUM SUCCINATE 50 MG: 100 INJECTION, POWDER, FOR SOLUTION INTRAMUSCULAR; INTRAVENOUS at 10:09

## 2024-05-05 RX ADMIN — MIDODRINE HYDROCHLORIDE 10 MG: 10 TABLET ORAL at 21:46

## 2024-05-05 RX ADMIN — ASPIRIN 81 MG CHEWABLE TABLET 81 MG: 81 TABLET CHEWABLE at 10:11

## 2024-05-05 RX ADMIN — ACETAMINOPHEN 650 MG: 325 TABLET ORAL at 05:46

## 2024-05-05 RX ADMIN — Medication 10 ML: at 09:00

## 2024-05-05 RX ADMIN — Medication 10 ML: at 21:47

## 2024-05-05 RX ADMIN — TAMSULOSIN HYDROCHLORIDE 0.4 MG: 0.4 CAPSULE ORAL at 10:11

## 2024-05-05 RX ADMIN — MIDODRINE HYDROCHLORIDE 10 MG: 10 TABLET ORAL at 13:53

## 2024-05-05 RX ADMIN — APIXABAN 2.5 MG: 2.5 TABLET, FILM COATED ORAL at 21:46

## 2024-05-05 NOTE — PLAN OF CARE
Problem: Adult Inpatient Plan of Care  Goal: Plan of Care Review  Outcome: Ongoing, Progressing  Flowsheets (Taken 5/5/2024 0625)    Goal Outcome Evaluation:    No acute events overnight. No c/o nausea, SOA or anxiety. V Paced on monitor, tolerating RA, TMax 99.9. Still hypotensive requiring levo, unable to wean. UOP 460mL, 3 incontinent BMs. A+O x 4. Minimal PO intake overnight. Tylenol PO given x 2 for mild pain.

## 2024-05-05 NOTE — PROGRESS NOTES
Intensive Care Follow-up     Hospital:  LOS: 12 days   Mr. Jermaine Singh, 79 y.o. male is followed for:   Cardiac arrest            Subjective     Interval History:    Doing well.    Still in minimal dose of Norepinephrine this morning.    Temp  Min: 99 °F (37.2 °C)  Max: 100.2 °F (37.9 °C)      The patient's past medical, surgical and social history were reviewed and updated in Epic as appropriate.      History     Last Reviewed by Jose Ramon Murphy MD on 2024 at  3:14 PM    Sections Reviewed    Medical, Surgical, Family, Tobacco, Alcohol, Drug Use, Sexual Activity,   Social Documentation    Problem list reviewed by Jose Ramon Murphy MD on 2024 at  3:14 PM  Medicines reviewed by Jose Ramon Murphy MD on 2024 at  3:14 PM  Allergies reviewed by Jose Ramon Murphy MD on 2024 at  3:14 PM        Objective     Vitals:  Temp: 100.2 °F (37.9 °C) (24 1200) Temp  Min: 99 °F (37.2 °C)  Max: 100.2 °F (37.9 °C)   Temp core:      BP: 104/65 (24 1230) BP  Min: 82/64  Max: 106/68   MAP (non-invasive) Noninvasive MAP (mmHg): 79 (24 1230) Noninvasive MAP (mmHg)  Av.9  Min: 47  Max: 137   Pulse: 66 (24 1230) Pulse  Min: 66  Max: 92   Resp: 18 (24 1200) Resp  Min: 14  Max: 20   SpO2: 97 % (24 1230) SpO2  Min: 92 %  Max: 100 %   Device: room air (24 0800)    Flow Rate: 1 (24 0400) No data recorded         24  0524  05   Weight: 88.6 kg (195 lb 5.2 oz) 87.1 kg (192 lb 0.3 oz)        Intake/Ouptut 24 hrs (7:00AM - 6:59 AM)  Intake & Output (last 2 days)          07 0700  0701   0700  0701  05/06 0700    P.O. 480  360    I.V. (mL/kg) 81.1 (0.9) 257.1 (3)     IV Piggyback  100     Total Intake(mL/kg) 561.1 (6.3) 357.1 (4.1) 360 (4.1)    Urine (mL/kg/hr) 2605 (1.2) 1885 (0.9) 200 (0.3)    Stool 0 0 0    Total Output 2605 1885 200    Net -2043.9 -1527.9 +160           Stool Unmeasured Occurrence 3 x 4 x 1 x            Medications  (drips):  norepinephrine, Last Rate: Stopped (05/05/24 1242)         Physical Examination  Telemetry:  Rhythm: paced rhythm (05/05/24 0800)      Constitutional:  No acute distress.   Cardiovascular: RRR.    Respiratory: Normal breath sounds  No adventitious sounds   Abdominal:  Soft with no tenderness.   Extremities: 1+ Edema   Neurological:   Alert, Oriented, Cooperative.    Best Eye Response: 3-->(E3) to speech (05/05/24 0800)  Best Motor Response: 6-->(M6) obeys commands (05/05/24 0800)  Best Verbal Response: 4-->(V4) confused (05/05/24 0800)  Binta Coma Scale Score: 13 (05/05/24 0800)    RASS (Michelle Agitation-Sedation Scale): -1-->drowsy (05/05/24 0600)    Confusion Assessment Method-ICU (CAM-ICU)  Feature 3: Altered Level of Consciousness: Positive (05/05/24 0600)     Results Reviewed:  Laboratory  Microbiology  Radiology  Pathology    Hematology:  Results from last 7 days   Lab Units 05/05/24 0523 05/04/24 0528 05/03/24 0441   WBC 10*3/mm3 6.61 9.42 10.37   HEMOGLOBIN g/dL 9.0* 9.2* 8.9*   MCV fL 91.7 89.2 90.7   PLATELETS 10*3/mm3 187 238 251     Results from last 7 days   Lab Units 05/05/24 0523 05/04/24 0528 05/03/24 0441   NEUTROS ABS 10*3/mm3 5.83 6.93 7.72*   LYMPHS ABS 10*3/mm3 0.53* 1.03 1.20   EOS ABS 10*3/mm3 0.00 0.30 0.35     Chemistry:  Estimated Creatinine Clearance: 26.3 mL/min (A) (by C-G formula based on SCr of 2.81 mg/dL (H)).    Results from last 7 days   Lab Units 05/05/24 0523 05/04/24 0528   SODIUM mmol/L 142 142   POTASSIUM mmol/L 4.1 4.0   CHLORIDE mmol/L 109* 110*   CO2 mmol/L 18.0* 20.0*   BUN mg/dL 71* 72*   CREATININE mg/dL 2.81* 2.95*   GLUCOSE mg/dL 159* 104*     Results from last 7 days   Lab Units 05/05/24  0523 05/04/24  0528 05/03/24  0441 05/02/24  0433   CALCIUM mg/dL 8.2* 8.0*   < > 7.8*   MAGNESIUM mg/dL  --  2.5*  --  2.8*   PHOSPHORUS mg/dL  --  4.5  --  4.9*    < > = values in this interval not displayed.     Hepatic Panel:  Results from last 7 days   Lab  Units 05/05/24  0523 05/04/24  0528 05/03/24  0441   ALBUMIN g/dL 3.3* 2.8* 2.8*   TOTAL PROTEIN g/dL 5.8* 5.2* 5.3*   BILIRUBIN mg/dL 0.5 0.4 0.4   AST (SGOT) U/L 11 12 13   ALT (SGPT) U/L 65* 84* 106*   ALK PHOS U/L 75 77 74     Images:  No radiology results for the last day    Echo:  Results for orders placed during the hospital encounter of 04/22/24    Adult Transthoracic Echo Complete W/ Cont if Necessary Per Protocol    Interpretation Summary    Left ventricular ejection fraction appears to be 21 - 25%.    The left atrial cavity is mildly dilated.    The right atrial cavity is mildly  dilated.    Estimated right ventricular systolic pressure from tricuspid regurgitation is normal (<35 mmHg). Calculated right ventricular systolic pressure from tricuspid regurgitation is 22 mmHg.    Akinetic anterior wall    MAC      Results: Reviewed.  I reviewed the patient's new laboratory and imaging results.  I independently reviewed the patient's new images.    Medications: Reviewed.        Assessment & Plan   Impression      Active Hospital Problems    Diagnosis  POA    **Cardiac arrest [I46.9]  Yes     Cardiac arrest requiring approximately 15 defibrillations, 4/25/2024  Echo (4/25/2024): LVEF <20%.  Dilated LV.      Acute respiratory failure with hypoxia [J96.01]  Yes    Elevated troponin level not due myocardial infarction [R79.89]  Yes    UTI (urinary tract infection) [N39.0]  Yes    Anemia, chronic disease [D63.8]  Yes    Stage 3b chronic kidney disease [N18.32]  Yes    HFrEF (heart failure with reduced ejection fraction) [I50.20]  Yes     Echo (8/11/2022): EF 48%.  No significant valvular abnormality  Echo (4/25/2024): LVEF <20%.  Dilated LV.      Pacemaker [Z95.0]  Yes     Status post [facility] pacemaker placement by Dr. Apodaca 5/18/2022      Type 2 diabetes mellitus with hyperglycemia [E11.65]  Yes    Hyperlipidemia LDL goal <70 [E78.5]  Yes    Hypothyroidism (acquired) [E03.9]  Yes    Coronary artery disease  involving native coronary artery of native heart with angina pectoris [I25.119]  Yes     Cardiac catheterization (2019): Severe multivessel disease with severely diffusely diseased LAD  CABG x3 by Pawan Au (3/2019): LIMA to LAD, SVG to OM, SVG to distal RCA  Nuclear stress (10/2022): No ischemia       Lab Results   Lab Value Date/Time    HGBA1C 8.50 (H) 04/23/2024 0550    HGBA1C 8.70 (H) 07/23/2023 2235     Results from last 7 days   Lab Units 05/05/24  1133 05/05/24  0803 05/04/24 2023 05/04/24  1640 05/04/24  1117 05/04/24  0716 05/03/24  1936 05/03/24  1716   GLUCOSE mg/dL 261* 166* 272* 193* 127 105 132* 124       Jermaine Singh is a 79 y.o. male  with a history of  who presented to [facility] on 4/22/24 with weakness and a chronic wound on his RLE. UA was consistent with a UTI. He was started on Rocephin and admitted to Hospital Medicine.      On 4/25/24, he suffered a V Fib arrest and was intubated during CPR. He was transferred to the ICU. He was able to be extubated on 4/26/24. After extubation, his respiratory status was tenuous and family decided to make him a DNR/DNI.  Patient was seen by infectious disease and on antibiotics.  Also has been requiring norepinephrine at low-dose.  Midodrine has been started. Patient also developed ischemic hepatitis which seems to be recovering.    Repeat echocardiogram showed ejection fraction remains at 21 to 25%.  Patient also  remains hemodynamically unstable and unable to be weaned off norepinephrine.    Comorbidities: T2DM, Stage 3 CKD, bilateral DVTs, paroxysmal Afib, SSS s/p post-pacemaker placement, CHF, PVD, and left diabetic foot infection    Cortisol level 05/04/24  9. R/O CIRCI. Start stress doses of mineralocorticoids (Hydrocortisone and Fludrocortisone) started on 05/04/24  Wean Norepi off.  Goal: Glucose < 180 mg/dL.   Continue Levothyroxine TSH 1.94 this admission  Anticoagulation with APIxaban  Discussed with Dr. Tilley  Goal: Glucose < 180  mg/dL.  Hyperglycemia worse with steroids.  Adjust doses of Insulin SQ.  Disposition: Keep in ICU       MDM:    Problem(s) High due to: Acute or Chronic illness or injury that may poses a threat to life or bodily function  Data: High due to: Review of the result(s) of each unique test, Ordering of each unique test, and Discuss management or test interpretation with external physician or NPP (not separately reported)    High

## 2024-05-05 NOTE — PROGRESS NOTES
" LOS: 12 days   Patient Care Team:  Marysol Shrestha MD as PCP - General (Internal Medicine)  Cooper Apodaca MD as Consulting Physician (Cardiology)    Chief Complaint: [place] on CKD stage III  Cardiorenal syndrome.     Subjective     Good response to diuretics. UOP ~ 1.8 liter. Net fluid balance ~ -1.5 liter. Some improvement in renal function noted. Otherwise clinical status is same.Appetite is poor. On low dose levophed. Now started on hydrocortisone and florinef for relative AI    Subjective:  Symptoms:  Stable.  No shortness of breath, chest pain or chest pressure.    Diet:  Adequate intake.  No nausea or vomiting.        History taken from: patient    Objective     Vital Sign Min/Max for last 24 hours  Temp  Min: 98.1 °F (36.7 °C)  Max: 99.9 °F (37.7 °C)   BP  Min: 82/64  Max: 106/68   Pulse  Min: 66  Max: 92   Resp  Min: 14  Max: 20   SpO2  Min: 92 %  Max: 100 %   No data recorded   Weight  Min: 87.1 kg (192 lb 0.3 oz)  Max: 87.1 kg (192 lb 0.3 oz)     Flowsheet Rows      Flowsheet Row First Filed Value   Admission Height 190.5 cm (75\") Documented at 04/22/2024 1438   Admission Weight 77.1 kg (170 lb) Documented at 04/22/2024 1438            No intake/output data recorded.  I/O last 3 completed shifts:  In: 404.2 [I.V.:304.2; IV Piggyback:100]  Out: 3390 [Urine:3390]    Objective:  General Appearance:  Comfortable.    Vital signs: (most recent): Blood pressure 98/65, pulse 70, temperature 99.3 °F (37.4 °C), temperature source Oral, resp. rate 20, height 190 cm (74.8\"), weight 87.1 kg (192 lb 0.3 oz), SpO2 96%.  Vital signs are normal.  No fever.    Output: Producing urine.    HEENT: Normal HEENT exam.    Lungs:  Normal effort and normal respiratory rate.  Breath sounds clear to auscultation.  He is not in respiratory distress.  No decreased breath sounds or wheezes.    Heart: Normal rate.  Regular rhythm.  S1 normal and S2 normal.  No gallop.   Abdomen: Abdomen is soft.  Bowel sounds are normal.   " "  Extremities: There is no dependent edema or local swelling.    Pulses: Distal pulses are intact.    Neurological: Patient is alert and oriented to person, place and time.  Normal strength.    Pupils:  Pupils are equal, round, and reactive to light.    Skin:  Pale.                Results Review:     I reviewed the patient's new clinical results.    WBC WBC   Date Value Ref Range Status   05/05/2024 6.61 3.40 - 10.80 10*3/mm3 Final   05/04/2024 9.42 3.40 - 10.80 10*3/mm3 Final   05/03/2024 10.37 3.40 - 10.80 10*3/mm3 Final      HGB Hemoglobin   Date Value Ref Range Status   05/05/2024 9.0 (L) 13.0 - 17.7 g/dL Final   05/04/2024 9.2 (L) 13.0 - 17.7 g/dL Final   05/03/2024 8.9 (L) 13.0 - 17.7 g/dL Final      HCT Hematocrit   Date Value Ref Range Status   05/05/2024 27.6 (L) 37.5 - 51.0 % Final   05/04/2024 28.1 (L) 37.5 - 51.0 % Final   05/03/2024 27.3 (L) 37.5 - 51.0 % Final      Platlets No results found for: \"LABPLAT\"   MCV MCV   Date Value Ref Range Status   05/05/2024 91.7 79.0 - 97.0 fL Final   05/04/2024 89.2 79.0 - 97.0 fL Final   05/03/2024 90.7 79.0 - 97.0 fL Final          Sodium Sodium   Date Value Ref Range Status   05/05/2024 142 136 - 145 mmol/L Final   05/04/2024 142 136 - 145 mmol/L Final   05/03/2024 140 136 - 145 mmol/L Final      Potassium Potassium   Date Value Ref Range Status   05/05/2024 4.1 3.5 - 5.2 mmol/L Final     Comment:     Slight hemolysis detected by analyzer. Result may be falsely elevated.   05/04/2024 4.0 3.5 - 5.2 mmol/L Final   05/03/2024 4.2 3.5 - 5.2 mmol/L Final      Chloride Chloride   Date Value Ref Range Status   05/05/2024 109 (H) 98 - 107 mmol/L Final   05/04/2024 110 (H) 98 - 107 mmol/L Final   05/03/2024 109 (H) 98 - 107 mmol/L Final      CO2 CO2   Date Value Ref Range Status   05/05/2024 18.0 (L) 22.0 - 29.0 mmol/L Final   05/04/2024 20.0 (L) 22.0 - 29.0 mmol/L Final   05/03/2024 18.0 (L) 22.0 - 29.0 mmol/L Final      BUN BUN   Date Value Ref Range Status   05/05/2024 " "71 (H) 8 - 23 mg/dL Final   05/04/2024 72 (H) 8 - 23 mg/dL Final   05/03/2024 79 (H) 8 - 23 mg/dL Final      Creatinine Creatinine   Date Value Ref Range Status   05/05/2024 2.81 (H) 0.76 - 1.27 mg/dL Final   05/04/2024 2.95 (H) 0.76 - 1.27 mg/dL Final   05/03/2024 3.38 (H) 0.76 - 1.27 mg/dL Final      Calcium Calcium   Date Value Ref Range Status   05/05/2024 8.2 (L) 8.6 - 10.5 mg/dL Final   05/04/2024 8.0 (L) 8.6 - 10.5 mg/dL Final   05/03/2024 7.9 (L) 8.6 - 10.5 mg/dL Final      PO4 No results found for: \"CAPO4\"   Albumin Albumin   Date Value Ref Range Status   05/05/2024 3.3 (L) 3.5 - 5.2 g/dL Final   05/04/2024 2.8 (L) 3.5 - 5.2 g/dL Final   05/03/2024 2.8 (L) 3.5 - 5.2 g/dL Final      Magnesium Magnesium   Date Value Ref Range Status   05/04/2024 2.5 (H) 1.6 - 2.4 mg/dL Final      Uric Acid No results found for: \"URICACID\"     Medication Review: Yes    Assessment & Plan       Cardiac arrest    Type 2 diabetes mellitus with hyperglycemia    Hyperlipidemia LDL goal <70    Hypothyroidism (acquired)    Coronary artery disease involving native coronary artery of native heart with angina pectoris    Pacemaker    HFrEF (heart failure with reduced ejection fraction)    Anemia, chronic disease    Stage 3b chronic kidney disease    UTI (urinary tract infection)    Acute respiratory failure with hypoxia    Elevated troponin level not due myocardial infarction      Assessment & Plan      [place] on CKD stage IIIb/IV:  Baseline cr 1.8-2.2 mg/dl. Recent cr trend 2.2>2.37>2.5>2.63>3.3. Etiology of [place] likely hemodynamic injury post vfib arrest vs decompensated cardiorenal syndrome. Taken off Zosyn due to concern for AIN. Urine eos 0%.         CKD stage III/IV: GFR baseline 28-32ml/min. Follows with Dr Pal. Risk factor for CKD: DM, HTN, PVD and CAD. Serological workup negative in the past. Including ANCA, antiGBM     Met acidosis: Due to [place] on CKD. Monitor for now. Avoiding bicarb supp due to sodium loading.      Cardiogenic " shock: Requiring pressor support. ECHO post arrest showed EF 20%. Hx of severe LV disease. Cardiology following. Repeat ECHO shows EF 21-25%. Requiring pressor support to optimize blood pressure. Random cortisol 9. On steroids.     Volume status: On RA. Does have dependent edema LE b/l. Improved with diuretics.      Hx of recurrent UTI: Recently treated for fungal UTI. Urology and ID following      Hx of HTN: Renal artery duplex negative for WILMA in the past.     Anemia: ACD. Monitor for now. Ferritin 309. Fe sat 9%     Recs  [place] on CKD stage IIIb/IV due to above mentioned risk factor. Repeat ECHO showed EF 21-25%. Given severe LAD disease and low EF patient is not a good candidate for long term dialysis. I discussed with patient continued medical management and optimization of blood pressure and volume status for now.   - Continue lasix 80mg IV daily.   - On midodrine 10mg TID in attempts to wean off pressor support.   - Monitor fluid retention on fludrocortisone  - Unable to add GDMT for CHF. Not a candidate for ACEi/ARB/SGLT-2inh  - Target MAP 65 or above  - Strict I/o  - Dose meds to eGFR   - Hospice following to discuss GOC.           I discussed the patients findings and my recommendations with patient       Blake Tilley MD  05/05/24  11:45 EDT

## 2024-05-06 ENCOUNTER — APPOINTMENT (OUTPATIENT)
Dept: GENERAL RADIOLOGY | Facility: HOSPITAL | Age: 79
End: 2024-05-06
Payer: MEDICARE

## 2024-05-06 LAB
ALBUMIN SERPL-MCNC: 3.3 G/DL (ref 3.5–5.2)
ANION GAP SERPL CALCULATED.3IONS-SCNC: 13 MMOL/L (ref 5–15)
BASOPHILS # BLD AUTO: 0.01 10*3/MM3 (ref 0–0.2)
BASOPHILS NFR BLD AUTO: 0.1 % (ref 0–1.5)
BUN SERPL-MCNC: 77 MG/DL (ref 8–23)
BUN/CREAT SERPL: 27.2 (ref 7–25)
CALCIUM SPEC-SCNC: 8.1 MG/DL (ref 8.6–10.5)
CHLORIDE SERPL-SCNC: 104 MMOL/L (ref 98–107)
CO2 SERPL-SCNC: 20 MMOL/L (ref 22–29)
CREAT SERPL-MCNC: 2.83 MG/DL (ref 0.76–1.27)
DEPRECATED RDW RBC AUTO: 49 FL (ref 37–54)
EGFRCR SERPLBLD CKD-EPI 2021: 22 ML/MIN/1.73
EOSINOPHIL # BLD AUTO: 0 10*3/MM3 (ref 0–0.4)
EOSINOPHIL NFR BLD AUTO: 0 % (ref 0.3–6.2)
ERYTHROCYTE [DISTWIDTH] IN BLOOD BY AUTOMATED COUNT: 15.2 % (ref 12.3–15.4)
GLUCOSE BLDC GLUCOMTR-MCNC: 235 MG/DL (ref 70–130)
GLUCOSE BLDC GLUCOMTR-MCNC: 246 MG/DL (ref 70–130)
GLUCOSE BLDC GLUCOMTR-MCNC: 251 MG/DL (ref 70–130)
GLUCOSE BLDC GLUCOMTR-MCNC: 285 MG/DL (ref 70–130)
GLUCOSE SERPL-MCNC: 252 MG/DL (ref 65–99)
HCT VFR BLD AUTO: 26.1 % (ref 37.5–51)
HGB BLD-MCNC: 8.4 G/DL (ref 13–17.7)
IMM GRANULOCYTES # BLD AUTO: 0.05 10*3/MM3 (ref 0–0.05)
IMM GRANULOCYTES NFR BLD AUTO: 0.6 % (ref 0–0.5)
LYMPHOCYTES # BLD AUTO: 0.44 10*3/MM3 (ref 0.7–3.1)
LYMPHOCYTES NFR BLD AUTO: 5.6 % (ref 19.6–45.3)
MCH RBC QN AUTO: 29.1 PG (ref 26.6–33)
MCHC RBC AUTO-ENTMCNC: 32.2 G/DL (ref 31.5–35.7)
MCV RBC AUTO: 90.3 FL (ref 79–97)
MONOCYTES # BLD AUTO: 0.38 10*3/MM3 (ref 0.1–0.9)
MONOCYTES NFR BLD AUTO: 4.8 % (ref 5–12)
NEUTROPHILS NFR BLD AUTO: 7.03 10*3/MM3 (ref 1.7–7)
NEUTROPHILS NFR BLD AUTO: 88.9 % (ref 42.7–76)
NRBC BLD AUTO-RTO: 0 /100 WBC (ref 0–0.2)
NT-PROBNP SERPL-MCNC: ABNORMAL PG/ML (ref 0–1800)
PHOSPHATE SERPL-MCNC: 4.6 MG/DL (ref 2.5–4.5)
PLATELET # BLD AUTO: 207 10*3/MM3 (ref 140–450)
PMV BLD AUTO: 10.7 FL (ref 6–12)
POTASSIUM SERPL-SCNC: 4.3 MMOL/L (ref 3.5–5.2)
RBC # BLD AUTO: 2.89 10*6/MM3 (ref 4.14–5.8)
SODIUM SERPL-SCNC: 137 MMOL/L (ref 136–145)
WBC NRBC COR # BLD AUTO: 7.91 10*3/MM3 (ref 3.4–10.8)

## 2024-05-06 PROCEDURE — 25010000002 HYDROCORTISONE SOD SUC (PF) 100 MG RECONSTITUTED SOLUTION: Performed by: INTERNAL MEDICINE

## 2024-05-06 PROCEDURE — 85025 COMPLETE CBC W/AUTO DIFF WBC: CPT | Performed by: INTERNAL MEDICINE

## 2024-05-06 PROCEDURE — 83880 ASSAY OF NATRIURETIC PEPTIDE: CPT | Performed by: INTERNAL MEDICINE

## 2024-05-06 PROCEDURE — 99233 SBSQ HOSP IP/OBS HIGH 50: CPT | Performed by: INTERNAL MEDICINE

## 2024-05-06 PROCEDURE — 25010000002 FUROSEMIDE PER 20 MG: Performed by: INTERNAL MEDICINE

## 2024-05-06 PROCEDURE — 71045 X-RAY EXAM CHEST 1 VIEW: CPT

## 2024-05-06 PROCEDURE — 92526 ORAL FUNCTION THERAPY: CPT

## 2024-05-06 PROCEDURE — 82948 REAGENT STRIP/BLOOD GLUCOSE: CPT

## 2024-05-06 PROCEDURE — 97597 DBRDMT OPN WND 1ST 20 CM/<: CPT

## 2024-05-06 PROCEDURE — 63710000001 INSULIN GLARGINE PER 5 UNITS: Performed by: INTERNAL MEDICINE

## 2024-05-06 PROCEDURE — 80069 RENAL FUNCTION PANEL: CPT | Performed by: INTERNAL MEDICINE

## 2024-05-06 PROCEDURE — 29580 STRAPPING UNNA BOOT: CPT

## 2024-05-06 PROCEDURE — 63710000001 INSULIN LISPRO (HUMAN) PER 5 UNITS: Performed by: INTERNAL MEDICINE

## 2024-05-06 RX ADMIN — Medication 10 ML: at 08:06

## 2024-05-06 RX ADMIN — LEVOTHYROXINE SODIUM 88 MCG: 88 TABLET ORAL at 05:37

## 2024-05-06 RX ADMIN — PANTOPRAZOLE SODIUM 40 MG: 40 TABLET, DELAYED RELEASE ORAL at 05:37

## 2024-05-06 RX ADMIN — INSULIN GLARGINE 16 UNITS: 100 INJECTION, SOLUTION SUBCUTANEOUS at 21:23

## 2024-05-06 RX ADMIN — HYDROCORTISONE SODIUM SUCCINATE 50 MG: 100 INJECTION, POWDER, FOR SOLUTION INTRAMUSCULAR; INTRAVENOUS at 16:06

## 2024-05-06 RX ADMIN — HYDROCORTISONE SODIUM SUCCINATE 50 MG: 100 INJECTION, POWDER, FOR SOLUTION INTRAMUSCULAR; INTRAVENOUS at 04:01

## 2024-05-06 RX ADMIN — INSULIN LISPRO 4 UNITS: 100 INJECTION, SOLUTION INTRAVENOUS; SUBCUTANEOUS at 17:05

## 2024-05-06 RX ADMIN — FUROSEMIDE 80 MG: 10 INJECTION, SOLUTION INTRAMUSCULAR; INTRAVENOUS at 08:06

## 2024-05-06 RX ADMIN — INSULIN LISPRO 4 UNITS: 100 INJECTION, SOLUTION INTRAVENOUS; SUBCUTANEOUS at 11:52

## 2024-05-06 RX ADMIN — MIDODRINE HYDROCHLORIDE 10 MG: 10 TABLET ORAL at 21:22

## 2024-05-06 RX ADMIN — HYDROCORTISONE SODIUM SUCCINATE 50 MG: 100 INJECTION, POWDER, FOR SOLUTION INTRAMUSCULAR; INTRAVENOUS at 10:20

## 2024-05-06 RX ADMIN — ATORVASTATIN CALCIUM 20 MG: 20 TABLET, FILM COATED ORAL at 21:22

## 2024-05-06 RX ADMIN — APIXABAN 2.5 MG: 2.5 TABLET, FILM COATED ORAL at 21:22

## 2024-05-06 RX ADMIN — Medication 1 APPLICATION: at 08:06

## 2024-05-06 RX ADMIN — MIDODRINE HYDROCHLORIDE 10 MG: 10 TABLET ORAL at 13:55

## 2024-05-06 RX ADMIN — APIXABAN 2.5 MG: 2.5 TABLET, FILM COATED ORAL at 08:06

## 2024-05-06 RX ADMIN — FLUDROCORTISONE ACETATE 100 MCG: 0.1 TABLET ORAL at 08:06

## 2024-05-06 RX ADMIN — Medication 10 ML: at 21:24

## 2024-05-06 RX ADMIN — INSULIN LISPRO 4 UNITS: 100 INJECTION, SOLUTION INTRAVENOUS; SUBCUTANEOUS at 08:06

## 2024-05-06 RX ADMIN — MIDODRINE HYDROCHLORIDE 10 MG: 10 TABLET ORAL at 05:37

## 2024-05-06 RX ADMIN — INSULIN LISPRO 1 UNITS: 100 INJECTION, SOLUTION INTRAVENOUS; SUBCUTANEOUS at 21:23

## 2024-05-06 RX ADMIN — ASPIRIN 81 MG CHEWABLE TABLET 81 MG: 81 TABLET CHEWABLE at 08:06

## 2024-05-06 RX ADMIN — SENNOSIDES AND DOCUSATE SODIUM 2 TABLET: 8.6; 5 TABLET ORAL at 21:22

## 2024-05-06 RX ADMIN — TAMSULOSIN HYDROCHLORIDE 0.4 MG: 0.4 CAPSULE ORAL at 08:06

## 2024-05-06 NOTE — PROGRESS NOTES
INFECTIOUS DISEASE Progress Note    Jermaine Singh  1945  [id number]    Admission Date: 4/22/2024      Requesting Provider: No ref. provider found  Evaluating Physician: Jf Padilla MD    Reason for Consultation:  UTI    History of present illness:    4/23/24: Patient is a 79 y.o. male  with a history of type 2 diabetes mellitus, peripheral neuropathy, stage III chronic kidney disease, bilateral DVTs, paroxysmal atrial fibrillation, sick sinus syndrome status post pacemaker placement, CHF, peripheral vascular disease, and left diabetic foot infection who is seen today for evaluation of UTI.  He had a history of UTI last month with urine cultures and early March growing yeast.  He was apparently treated with an antifungal agent.    Over the last few days he noted the onset of profound weakness and fatigue.  He also noticed some dysuria.  He was brought to the emergency room and found to have pyuria and bacteriuria with leukocytosis.   Urine culture was sent and he was started on ceftriaxone.   His white blood cell count today is 11.8.   He was noted to have a blister over his right fifth metatarsal head and a chronic right pretibial wound.    4/24/24: He remains afebrile.  His urine culture was finalized as negative.  I have asked the laboratory to hold the urine culture for yeast but they did not and it was disposed of last night.   He states that his dysuria is improved today.  Feels better.     4/25/2024:  He remains afebrile.  White blood cell count today is 6.7.  Creatinine is 1.99.   Urinalysis yesterday revealed too numerous to count white blood cells.  He denied dysuria this morning.  After I saw him he suffered a V-fib cardiac arrest and was transferred to the ICU.  He is endotracheally intubated    4/26/24:   His maximum temperature over the last 24 hours is 99.5.  Repeat urine culture from 4/24 is pending.  He remains on ventilatory support.    Creatinine is 2.28.  ALT is  1028.  White blood  cell count is 9.95.  O2 saturation is 100% on an FiO2 of 30%.  Chest x-ray reveals no pulmonary infiltrates. \    Later this morning he was extubated.  He is confused.  He knows his name and his birthdate.  He cannot answer more complex questions.    4/27/2024:  His maximum temperature over the last 24 hours is 99.5.  His left ventricular ejection fraction is less than 20%.  His white blood cell count is 2.37.  ALT is down to 626.   Chest x-ray 4/27 reveals moderate hemoglobin airspace disease.   O2 saturation is 99% on 4 L.   He is on Zosyn therapy.   Later today his oxygen demand increased and he was placed   On BiPAP.     4/28/2024:   He has remained afebrile.   White blood cell count is 10.1. His creatinine is 2.65 ALT is 450.  His oxygen demand is down to 1 liter today. Chest x-ray today reveals interval improvement in the right upper lobe infiltrate.  He is coughing and has some sputum production.  He remains confused and did not recognize his wife and daughter today      4/29/2024: He remains afebrile.  His creatinine is 2.52.  ALT is 320.  White blood cell count is 9.3.    He is now on 1 L with an O2 saturation of 96%.  He is more alert and responsive today.  He is less confused.     4/30/2024: He has remained afebrile.  Creatinine is 2.68.  ALT is 218.   White blood cell count is 9.1.  room air. room air.  His confusion is improved today.  He recognized his wife today.  He denies increased cough and sputum production.  He knows me by name today.    5/1/2024:  He remains afebrile.  His creatinine today is 3.39.   White blood cell count is 8.9. O2 saturation is 97% on room air.      5/2/2024:  He remains afebrile.  His creatinine today is 3.3.  White blood cell count is 10.7.   Urine eosinophil smear was 0. He has continued to require vasopressor therapy.   Echocardiogram from yesterday reveals a 21-25% ejection fraction.   He feels better today.  His daughter indicates that  is cognitive function has  improved and he is no longer confused.    5/3/2024:  Maximum temperature over the last 24 hours is 99.4.  Creatinine is 3.38.    ALT is 106.   white blood cell count is 10.4.  O2 saturation is 94% on room air.  He denies increased dyspnea.  He is still requiring low-dose vasopressor support.    5/4/24: History reviewed.  I am following the patient for Dr. Lincoln Padilla over the weekend. The patient denies any worsening shortness of breath.  Breathing is nonlabored on room air.  He has not had any fevers.  He is now off of antibiotics since 5/1.  Creatinine was downtrending to 2.95, from 3.38.  White blood count is normal at 9.42.  He denies any nausea, vomiting, or diarrhea.  He denies peripheral edema.  Right lower extremity with Unna boot in place.    5/5/24: Patient had a temperature up to 100.2°F today.  No shaking chills.  Fonseca catheter remains in place.  No abdominal pain.  No worsening shortness of breath or productive cough.  No new rashes reported.  No increased leg pain.  He denies any diarrhea.    Past Medical History:   Diagnosis Date    Allergic     Arthritis     Asthma     Coronary artery disease     Diabetes mellitus 2000    started on inuslin 12/2018; started on po meds in 2000; checking blood sugars daily     Diabetic foot infection 07/23/2023    Disease of thyroid gland     po meds daily for hypothyroidism     Elevated serum creatinine 11/15/2022    Elevated troponin 10/02/2022    Generalized weakness 11/15/2022    History of fracture as a child     rt leg- severe     Hyperlipidemia     Hypertension     Hypothyroidism     PAF (paroxysmal atrial fibrillation) 07/23/2023    Peripheral neuropathy     Peripheral vascular disease     s/p angiogram 2/19-needs stent in left leg     Pleural effusion, bilateral 11/15/2022    Proximal phalanx fracture of the second digit extending into the second metatarsal joint 07/28/2022    RBBB     Right knee pain     Status post amputation of great toe and  second toe of left foot 07/23/2023    Status post amputation of great toe, left 11/15/2022    Unstable angina 02/12/2019    Added automatically from request for surgery [id number]    Vitamin D deficiency        Past Surgical History:   Procedure Laterality Date    AMPUTATION DIGIT Left 01/17/2023    Procedure: AMPUTATION DIGIT LEFT;  Surgeon: Gopal Márquez MD;  Location:  [facility];  Service: Vascular;  Laterality: Left;    APPENDECTOMY      CARDIAC CATHETERIZATION N/A 02/14/2019    Procedure: Left Heart Cath;  Surgeon: Cooper Apodaca MD;  Location:  [facility];  Service: Cardiology    CARDIAC ELECTROPHYSIOLOGY PROCEDURE N/A 05/18/2022    Procedure: DEVICE IMPLANT;  Surgeon: Cooper Apodaca MD;  Location:  [facility]ATION;  Service: Cardiology;  Laterality: N/A;    CARDIAC SURGERY      COLONOSCOPY      CORONARY ARTERY BYPASS GRAFT N/A 03/22/2019    Procedure: MEDIAN STERNOTOMY, CORONARY ARTERY BYPASS GRAFT X3, UTILIZING THE LEFT INTERNAL MAMMARY ARTERY, EVH AND OPEN HARVEST OF THE RIGHT GREATER SAPHENOUS VEIN, EXPLORATION OF THE LEFT LEG;  Surgeon: pA Au MD;  Location:  [facility];  Service: Cardiothoracic    EYE SURGERY Bilateral     cataracts     FRACTURE SURGERY      INTERVENTIONAL RADIOLOGY PROCEDURE N/A 07/29/2021    Procedure: Abdominal Aortagram with Runoff;  Surgeon: Jermaine Hernandez MD;  Location:  [facility]AS[facility]ATION;  Service: Cardiovascular;  Laterality: N/A;    KNEE ARTHROSCOPY      right x 2, left x 1    LACERATION REPAIR      right leg    LEG SURGERY      2 for fracture of rt leg     TONSILLECTOMY      Adnoidectomy    TRANS METATARSAL AMPUTATION Left 08/02/2022    Procedure: GREAT TOE AMPUTATION LEFT;  Surgeon: Gopal Márquez MD;  Location:  [facility];  Service: Vascular;  Laterality: Left;       Family History   Problem Relation Age of Onset    Coronary artery disease Mother     Diabetes Mother     Hyperlipidemia Mother     Cancer Father         Social History     Socioeconomic History    Marital status:     Number of children: 2   Tobacco Use    Smoking status: Never     Passive exposure: Past    Smokeless tobacco: Never   Vaping Use    Vaping status: Never Used   Substance and Sexual Activity    Alcohol use: Not Currently     Comment: every 2-3 months    Drug use: No    Sexual activity: Not Currently     Partners: Female       Allergies   Allergen Reactions    Vancomycin Other (See Comments)     Kidney failure per last admission         Medication:    Current Facility-Administered Medications:     acetaminophen (TYLENOL) tablet 650 mg, 650 mg, Oral, Q6H PRN, Maricel Peraza, APRN, 650 mg at 05/05/24 0546    albuterol (PROVENTIL) nebulizer solution 0.083% 2.5 mg/3mL, 2.5 mg, Nebulization, BID PRN, Nathalia Mckeon DNP, APRN, 2.5 mg at 04/30/24 0755    apixaban (ELIQUIS) tablet 2.5 mg, 2.5 mg, Oral, Q12H, Antonio Angeles MD, 2.5 mg at 05/05/24 2146    aspirin chewable tablet 81 mg, 81 mg, Oral, Daily, Maricel Peraza, APRN, 81 mg at 05/05/24 1011    atorvastatin (LIPITOR) tablet 20 mg, 20 mg, Oral, Nightly, Gloden Madison MD, 20 mg at 05/05/24 2146    castor oil-balsam peru (VENELEX) ointment 1 Application, 1 Application, Topical, Daily, Eyad Ledesma MD, 1 Application at 05/04/24 0818    dextrose (D50W) (25 g/50 mL) IV injection 10-50 mL, 10-50 mL, Intravenous, Q15 Min PRN, Jose Ramon Murphy MD    dextrose (GLUTOSE) oral gel 15 g, 15 g, Oral, Q15 Min PRN, Jose Ramon Murphy MD    fludrocortisone tablet 100 mcg, 100 mcg, Oral, Daily, Jose Ramon Murphy MD, 100 mcg at 05/05/24 1011    furosemide (LASIX) injection 80 mg, 80 mg, Intravenous, Daily, Blake Tilley MD, 80 mg at 05/05/24 1007    glucagon (GLUCAGEN) injection 1 mg, 1 mg, Intramuscular, Q15 Min PRN, Jose Ramon Murphy MD    HYDROcodone-acetaminophen (NORCO) 5-325 MG per tablet 1 tablet, 1 tablet, Oral, Q4H PRN, Antonio Angeles MD, 1 tablet at 05/05/24 1011    [COMPLETED]  Hydrocortisone Sod Suc (PF) (Solu-CORTEF) injection 100 mg, 100 mg, Intravenous, Once, 100 mg at 05/04/24 0951 **FOLLOWED BY** Hydrocortisone Sod Suc (PF) (Solu-CORTEF) injection 50 mg, 50 mg, Intravenous, Q6H, Jose Ramon Murphy MD, 50 mg at 05/05/24 2146    insulin glargine (LANTUS, SEMGLEE) injection 1-200 Units, 1-200 Units, Subcutaneous, Nightly - Glucommander, Jose Ramon Murphy MD, 13 Units at 05/05/24 2146    insulin lispro (humaLOG) injection 1-200 Units, 1-200 Units, Subcutaneous, 4x Daily With Meals & Nightly, Jose Ramon Murphy MD, 3 Units at 05/05/24 2146    insulin lispro (humaLOG) injection 1-200 Units, 1-200 Units, Subcutaneous, PRN, Jose Ramon Murphy MD, 4 Units at 05/05/24 1751    levothyroxine (SYNTHROID, LEVOTHROID) tablet 88 mcg, 88 mcg, Oral, Q AM, Maricel Peraza APRN, 88 mcg at 05/05/24 0523    Magnesium Low Dose Replacement - Follow Nurse / BPA Driven Protocol, , Does not apply, PRN, Maricel Peraza APRN    midodrine (PROAMATINE) tablet 10 mg, 10 mg, Oral, TID AC, Golden Madison MD, 10 mg at 05/05/24 2146    nitroglycerin (NITROSTAT) SL tablet 0.4 mg, 0.4 mg, Sublingual, Q5 Min PRN, Maricel Peraza APRN    norepinephrine (LEVOPHED) 8 mg in 250 mL NS infusion (premix), 0.02-0.3 mcg/kg/min, Intravenous, Titrated, Eyad Ledesma MD, Stopped at 05/05/24 1242    pantoprazole (PROTONIX) EC tablet 40 mg, 40 mg, Oral, Q AM, Antonio Angeles MD, 40 mg at 05/05/24 0523    polyethylene glycol (MIRALAX) packet 17 g, 17 g, Oral, Daily, Artur Avila MD, 17 g at 05/02/24 1037    prochlorperazine (COMPAZINE) injection 5 mg, 5 mg, Intravenous, Q6H PRN, Maricel Peraza, APRN, 2.5 mg at 04/26/24 1316    sennosides-docusate (PERICOLACE) 8.6-50 MG per tablet 2 tablet, 2 tablet, Oral, BID, 2 tablet at 05/02/24 2107 **AND** [DISCONTINUED] polyethylene glycol (MIRALAX) packet 17 g, 17 g, Oral, Daily PRN, 17 g at 04/30/24 2038 **AND** [DISCONTINUED] bisacodyl (DULCOLAX) EC tablet 5 mg, 5  mg, Oral, Daily PRN, 5 mg at 24 0956 **AND** [DISCONTINUED] bisacodyl (DULCOLAX) suppository 10 mg, 10 mg, Rectal, Daily PRN, Antonio Angeles MD    sodium chloride 0.9 % flush 10 mL, 10 mL, Intravenous, PRN, Case, Hailee V., DO    sodium chloride 0.9 % flush 10 mL, 10 mL, Intravenous, Q12H, Case, Hailee V., DO, 10 mL at 24 2147    tamsulosin (FLOMAX) 24 hr capsule 0.4 mg, 0.4 mg, Oral, Daily, Maricel Peraza, APRN, 0.4 mg at 24 1011    Antibiotics:  Anti-Infectives (From admission, onward)      Ordered     Dose/Rate Route Frequency Start Stop    24 1356  piperacillin-tazobactam (ZOSYN) 3.375 g IVPB in 100 mL NS MBP (CD)        Ordering Provider: Eyad Ledesma MD    3.375 g  over 30 Minutes Intravenous Once 24 1445 24 1557              Review of Systems:  See HPI      Physical Exam:   Vital Signs  Temp (24hrs), Av.2 °F (37.3 °C), Min:98 °F (36.7 °C), Max:100.2 °F (37.9 °C)    Temp  Min: 98 °F (36.7 °C)  Max: 100.2 °F (37.9 °C)  BP  Min: 85/57  Max: 104/66  Pulse  Min: 61  Max: 92  Resp  Min: 14  Max: 20  SpO2  Min: 92 %  Max: 100 %    GENERAL:   Alert and responsive. Sitting up in bed  HEENT: Normocephalic, atraumatic.  No external oral lesions noted  NECK: Supple   HEART:  irregular rate, No murmur   LUNGS:   Diminished in the lung bases.  Nonlabored breathing on room air  ABDOMEN: Soft, nontender, nondistended. No rebound or guarding. NO mass or HSM.  EXT: Right lower extremity with Unna boot in place.  No peripheral edema or cellulitic change noted over his left lower extremity.  :   Fonseca catheter in place. Yellow urine collection container  MSK: No joint effusions or erythema  SKIN: No generalized rashes noted.  No peripheral stigmata of infective endocarditis noted.  NEURO:  Awake alert and oriented.  Normal speech and cognition.    Right upper extremity PICC line site is without any erythema    Laboratory Data    Results from last 7 days   Lab Units  "05/05/24  0523 05/04/24  0528 05/03/24  0441   WBC 10*3/mm3 6.61 9.42 10.37   HEMOGLOBIN g/dL 9.0* 9.2* 8.9*   HEMATOCRIT % 27.6* 28.1* 27.3*   PLATELETS 10*3/mm3 187 238 251     Results from last 7 days   Lab Units 05/05/24  0523   SODIUM mmol/L 142   POTASSIUM mmol/L 4.1   CHLORIDE mmol/L 109*   CO2 mmol/L 18.0*   BUN mg/dL 71*   CREATININE mg/dL 2.81*   GLUCOSE mg/dL 159*   CALCIUM mg/dL 8.2*     Results from last 7 days   Lab Units 05/05/24  0523   ALK PHOS U/L 75   BILIRUBIN mg/dL 0.5   ALT (SGPT) U/L 65*   AST (SGOT) U/L 11                         Estimated Creatinine Clearance: 26.3 mL/min (A) (by C-G formula based on SCr of 2.81 mg/dL (H)).      Microbiology:  No results found for: \"ACANTHNAEG\", \"AFBCX\", \"BPERTUSSISCX\", \"BLOODCX\"  No results found for: \"BCIDPCR\", \"CXREFLEX\", \"CSFCX\", \"CULTURETIS\"  No results found for: \"CULTURES\", \"HSVCX\", \"URCX\"  No results found for: \"EYECULTURE\", \"GCCX\", \"HSVCULTURE\", \"LABHSV\"  No results found for: \"LEGIONELLA\", \"MRSACX\", \"MUMPSCX\", \"MYCOPLASCX\"  No results found for: \"NOCARDIACX\", \"STOOLCX\"  Urine Culture   Date Value Ref Range Status   04/22/2024 No growth  Preliminary     No results found for: \"VIRALCULTU\", \"WOUNDCX\"        Radiology:  Imaging Results (Last 72 Hours)       ** No results found for the last 72 hours. **              Impression:    1.    Right upper lobe aspiration pneumonia-he is S/P Zosyn therapy.  He is clinically and radiographically improved.  Zosyn was discontinued on 5/1   2.  V-fib arrest-status post resuscitation.    3.   Acute kidney injury-superimposed on stage IIIb chronic kidney disease.   4.  Chronic right pretibial dermatitis-without evidence of cellulitis  5.  Type 2 diabetes mellitus  6.  coronary artery disease/status post CABG, 3/22/2019  7.  Severe systolic heart failure- with some improvement on his echocardiogram of 5/1.  8.  Stage IIIb chronic kidney disease  9.  Paroxysmal atrial fibrillation  10.  Acute hypoxic respiratory " failure- improved   11.  Ischemic hepatitis- improved  12.  Pyuria- with a negative urine culture.  13.  Anoxic encephalopathy- improved  14.  Right fifth metatarsal wound/bullous lesion    PLAN/RECOMMENDATIONS:   1.  Continue with a right leg Unna boot  2.  Continue off of antibiotic therapy  3.  Diuretic and vasopressor therapy per  intensivist service.  4.  Patient had a temperature up to 100.2°F.  Could be due to atelectasis.  We will continue to monitor with low threshold to send repeat cultures if he has persistent fevers.  I discussed with nursing staff.    I reviewed the patient's labs and medications today    His overall prognosis is extremely poor due to his severe underlying systolic congestive heart failure.   He may be transitioning to hospice.      I discussed with nursing staff today    Dr. Lincoln Padilla will be back tomorrow    Jf Padilla MD  5/5/2024  22:16 EDT

## 2024-05-06 NOTE — THERAPY TREATMENT NOTE
Acute Care - Speech Language Pathology   Swallow Treatment Note [facility]     Patient Name: Jermaine Singh  : 1945  MRN: [id number]  Today's Date: 2024               Admit Date: 2024    Visit Dx:     ICD-10-CM ICD-9-CM   1. Acute UTI  N39.0 599.0   2. Generalized weakness  R53.1 780.79   3. Edema of right lower extremity  R60.0 782.3   4. Leg erythema  L53.9 695.9   5. Chronic kidney disease, unspecified CKD stage  N18.9 585.9   6. Elevated troponin  R79.89 790.6   7. Hyperkalemia  E87.5 276.7   8. Cardiac arrest  I46.9 427.5   9. Oropharyngeal dysphagia  R13.12 787.22     Patient Active Problem List   Diagnosis    Type 2 diabetes mellitus with hyperglycemia    Hyperlipidemia LDL goal <70    Hypothyroidism (acquired)    Vitamin D deficiency on Rx     Diabetic neuropathy    Coronary artery disease involving native coronary artery of native heart with angina pectoris    Atherosclerosis of native artery of both lower extremities with intermittent claudication    Pacemaker    HFrEF (heart failure with reduced ejection fraction)    DVT, bilateral lower limbs    Cellulitis of right lower extremity    Anemia, chronic disease    PVD (peripheral vascular disease)    Diabetic foot ulcer    GERD without esophagitis    Stage 3b chronic kidney disease    Right inguinal hernia    PAF (paroxysmal atrial fibrillation)    Moderate malnutrition    UTI (urinary tract infection)    Cardiac arrest    Acute respiratory failure with hypoxia    History of DVT (deep vein thrombosis)    Elevated troponin level not due myocardial infarction     Past Medical History:   Diagnosis Date    Allergic     Arthritis     Asthma     Coronary artery disease     Diabetes mellitus 2000    started on inuslin 2018; started on po meds in ; checking blood sugars daily     Diabetic foot infection 2023    Disease of thyroid gland     po meds daily for hypothyroidism     Elevated serum creatinine 11/15/2022    Elevated  troponin 10/02/2022    Generalized weakness 11/15/2022    History of fracture as a child     rt leg- severe     Hyperlipidemia     Hypertension     Hypothyroidism     PAF (paroxysmal atrial fibrillation) 07/23/2023    Peripheral neuropathy     Peripheral vascular disease     s/p angiogram 2/19-needs stent in left leg     Pleural effusion, bilateral 11/15/2022    Proximal phalanx fracture of the second digit extending into the second metatarsal joint 07/28/2022    RBBB     Right knee pain     Status post amputation of great toe and second toe of left foot 07/23/2023    Status post amputation of great toe, left 11/15/2022    Unstable angina 02/12/2019    Added automatically from request for surgery [id number]    Vitamin D deficiency      Past Surgical History:   Procedure Laterality Date    AMPUTATION DIGIT Left 01/17/2023    Procedure: AMPUTATION DIGIT LEFT;  Surgeon: Gopal Márquez MD;  Location:  [facility];  Service: Vascular;  Laterality: Left;    APPENDECTOMY      CARDIAC CATHETERIZATION N/A 02/14/2019    Procedure: Left Heart Cath;  Surgeon: Cooper Apodaca MD;  Location:  [facility];  Service: Cardiology    CARDIAC ELECTROPHYSIOLOGY PROCEDURE N/A 05/18/2022    Procedure: DEVICE IMPLANT;  Surgeon: Cooper Apodaca MD;  Location:  [facility];  Service: Cardiology;  Laterality: N/A;    CARDIAC SURGERY      COLONOSCOPY      CORONARY ARTERY BYPASS GRAFT N/A 03/22/2019    Procedure: MEDIAN STERNOTOMY, CORONARY ARTERY BYPASS GRAFT X3, UTILIZING THE LEFT INTERNAL MAMMARY ARTERY, EVH AND OPEN HARVEST OF THE RIGHT GREATER SAPHENOUS VEIN, EXPLORATION OF THE LEFT LEG;  Surgeon: Ap Au MD;  Location:  [facility];  Service: Cardiothoracic    EYE SURGERY Bilateral     cataracts     FRACTURE SURGERY      INTERVENTIONAL RADIOLOGY PROCEDURE N/A 07/29/2021    Procedure: Abdominal Aortagram with Runoff;  Surgeon: Jermaine Hernandez MD;  Location:  [facility];   Service: Cardiovascular;  Laterality: N/A;    KNEE ARTHROSCOPY      right x 2, left x 1    LACERATION REPAIR      right leg    LEG SURGERY      2 for fracture of rt leg     TONSILLECTOMY      Adnoidectomy    TRANS METATARSAL AMPUTATION Left 08/02/2022    Procedure: GREAT TOE AMPUTATION LEFT;  Surgeon: Gopal Márquez MD;  Location: [facility];  Service: Vascular;  Laterality: Left;       SLP Recommendation and Plan     SLP Diet Recommendation: regular textures, no mixed consistencies, nectar thick liquids (05/06/24 1440)  Recommended Precautions and Strategies: upright posture during/after eating, small bites of food and sips of liquid, no straw, multiple swallows per bite of food, multiple swallows per sip of liquid, general aspiration precautions, assist with feeding (05/06/24 1440)  SLP Rec. for Method of Medication Administration: meds whole, meds crushed, with puree, as tolerated (05/06/24 1440)     Monitor for Signs of Aspiration: yes, notify SLP if any concerns (05/06/24 1440)  Recommended Diagnostics: reassess via FEES (05/06/24 1440)     Anticipated Discharge Disposition (SLP): inpatient rehabilitation facility (05/06/24 1440)        Predicted Duration Therapy Intervention (Days): until discharge (05/06/24 1440)  Oral Care Recommendations: Oral Care BID/PRN, Suction toothbrush (05/06/24 1440)        Daily Summary of Progress (SLP): progress toward functional goals is good (05/06/24 1440)               Treatment Assessment (SLP): toleration of diet, no clinical signs of, pharyngeal dysphagia (05/06/24 1440)  Treatment Assessment Comments (SLP): No overt s/s of aspiration accross trials of thin liquid or NTL trials. Reviewed/completed all exercises. Plam for repeat FEES 5/7 (05/06/24 1440)  Plan for Continued Treatment (SLP): continue treatment per plan of care (05/06/24 1440)                SWALLOW EVALUATION (Last 72 Hours)       SLP Adult Swallow Evaluation       Row Name 05/06/24 1440                    Rehab Evaluation    Document Type therapy note (daily note)  [facility]        Subjective Information no complaints  -        Patient Observations alert;cooperative  -        Patient/Family/Caregiver Comments/Observations Family present  -        Patient Effort good  -        Symptoms Noted During/After Treatment none  -           Pain    Additional Documentation Pain Scale: FACES Pre/Post-Treatment (Group)  -           Pain Scale: FACES Pre/Post-Treatment    Pain: FACES Scale, Pretreatment 0-->no hurt  -        Posttreatment Pain Rating 0-->no hurt  -           SLP Treatment Clinical Impressions    Treatment Assessment (SLP) toleration of diet;no clinical signs of;pharyngeal dysphagia  -        Treatment Assessment Comments (SLP) No overt s/s of aspiration accross trials of thin liquid or NTL trials. Reviewed/completed all exercises. Plam for repeat FEES 5/7  -        Daily Summary of Progress (SLP) progress toward functional goals is good  -        Plan for Continued Treatment (SLP) continue treatment per plan of care  -        Care Plan Review care plan/treatment goals reviewed  -        Care Plan Review, Other Participant(s) family  -           Recommendations    Predicted Duration Therapy Intervention (Days) until discharge  -        SLP Diet Recommendation regular textures;no mixed consistencies;nectar thick liquids  -        Recommended Diagnostics reassess via FEES  -        Recommended Precautions and Strategies upright posture during/after eating;small bites of food and sips of liquid;no straw;multiple swallows per bite of food;multiple swallows per sip of liquid;general aspiration precautions;assist with feeding  -        Oral Care Recommendations Oral Care BID/PRN;Suction toothbrush  -        SLP Rec. for Method of Medication Administration meds whole;meds crushed;with puree;as tolerated  -        Monitor for Signs of Aspiration yes;notify SLP if any concerns  [facility]         Anticipated Discharge Disposition (SLP) inpatient rehabilitation facility  -                  User Key  (r) = Recorded By, (t) = Taken By, (c) = Cosigned By      Initials Name Effective Dates     Ghislaine Sparks, MS [facility]-SLP 05/12/23 -                     EDUCATION  The patient has been educated in the following areas:   Dysphagia (Swallowing Impairment) Modified Diet Instruction.        SLP GOALS       Row Name 05/06/24 1440             (LTG) Patient will demonstrate functional swallow for    Diet Texture (Demonstrate functional swallow) regular textures  -      Liquid viscosity (Demonstrate functional swallow) thin liquids  -      [place] (Demonstrate functional swallow) with minimal cues (75-90% accuracy)  -      Time Frame (Demonstrate functional swallow) by discharge  -      Progress/Outcomes (Demonstrate functional swallow) continuing progress toward goal  -      Comment (Demonstrate functional swallow) No overt s/s of aspiration w/ thin liquid trials  -         ([facility]) Patient will tolerate trials of    Consistencies Trialed (Tolerate trials) regular textures;nectar/ mildly thick liquids  -      Desired Outcome (Tolerate trials) without signs/symptoms of aspiration;without signs of distress;with adequate oral prep/transit/clearance  -      [place] (Tolerate trials) with minimal cues (75-90% accuracy)  -      Time Frame (Tolerate trials) 1 week  -      Progress/Outcomes (Tolerate trials) continuing progress toward goal  -      Comment (Tolerate trials) No overt s/s of aspiration w/ NTL trials  -         ([facility]) Lingual Strengthening Goal 1 (SLP)    Activity (Lingual Strengthening Goal 1, SLP) increase tongue back strength  -      Increase Tongue Back Strength lingual resistance exercises  -      [place]/Accuracy (Lingual Strengthening Goal 1, SLP) with moderate cues (50-74% accuracy)  -      Time Frame (Lingual Strengthening Goal 1, SLP) 1 week  -       Progress/Outcomes (Lingual Strengthening Goal 1, SLP) continuing progress toward goal  -MH         ([facility]) Pharyngeal Strengthening Exercise Goal 1 (SLP)    Activity (Pharyngeal Strengthening Goal 1, SLP) increase tongue base retraction;increase squeeze/positive pressure generation;increase closure at entrance to airway/closure of airway at glottis;increase superior movement of the hyolaryngeal complex;increase anterior movement of the hyolaryngeal complex  -      Increase Superior Movement of the Hyolaryngeal Complex effortful pitch glide (falsetto + pharyngeal squeeze)  -MH      Increase Anterior Movement of the Hyolaryngeal Complex EMST  -MH      Increase Closure at Entrance to Airway/Closure of Airway at Glottis supraglottic swallow;breath hold exercises  -      Increase Squeeze/Positive Pressure Generation hard effortful swallow  -      Increase Tongue Base Retraction micha  -      [place]/Accuracy (Pharyngeal Strengthening Goal 1, SLP) with moderate cues (50-74% accuracy)  -      Time Frame (Pharyngeal Strengthening Goal 1, SLP) 1 week  -      Progress/Outcomes (Pharyngeal Strengthening Goal 1, SLP) continuing progress toward goal  -MH      Comment (Pharyngeal Strengthening Goal 1, SLP) Reviewed/completed all exercises  -                User Key  (r) = Recorded By, (t) = Taken By, (c) = Cosigned By      Initials Name Provider Type    Ghislaine Hollingsworth, MS CCC-SLP Speech and Language Pathologist                       Time Calculation:    Time Calculation- SLP       Row Name 05/06/24 1628             Time Calculation- SLP    SLP Start Time 1440  -      SLP Received On 05/06/24  -         Untimed Charges    80441-KL Treatment Swallow Minutes 40  -MH         Total Minutes    Untimed Charges Total Minutes 40  -MH       Total Minutes 40  -MH                User Key  (r) = Recorded By, (t) = Taken By, (c) = Cosigned By      Initials Name Provider Type    Ghislaine Hollingsworth, MS CCC-SLP  Speech and Language Pathologist                    Therapy Charges for Today       Code Description Service Date Service Provider Modifiers Qty    [id number] HC ST TREATMENT SWALLOW 3 5/6/2024 Ghislaine Sparks, MS CCC-SLP GN 1                 Ghislaine Sparks MS CCC-SLP  5/6/2024

## 2024-05-06 NOTE — PLAN OF CARE
Goal Outcome Evaluation:                     Anticipated Discharge Disposition (SLP): inpatient rehabilitation facility             Treatment Assessment (SLP): toleration of diet, no clinical signs of, pharyngeal dysphagia (05/06/24 1440)    Plan for Continued Treatment (SLP): continue treatment per plan of care (05/06/24 1440)

## 2024-05-06 NOTE — PROGRESS NOTES
INFECTIOUS DISEASE Progress Note    Jermaine Singh  1945  [id number]    Admission Date: 4/22/2024      Requesting Provider: No ref. provider found  Evaluating Physician: Lincoln Padilla MD    Reason for Consultation:  UTI    History of present illness:    4/23/24: Patient is a 79 y.o. male  with a history of type 2 diabetes mellitus, peripheral neuropathy, stage III chronic kidney disease, bilateral DVTs, paroxysmal atrial fibrillation, sick sinus syndrome status post pacemaker placement, CHF, peripheral vascular disease, and left diabetic foot infection who is seen today for evaluation of UTI.  He had a history of UTI last month with urine cultures and early March growing yeast.  He was apparently treated with an antifungal agent.    Over the last few days he noted the onset of profound weakness and fatigue.  He also noticed some dysuria.  He was brought to the emergency room and found to have pyuria and bacteriuria with leukocytosis.   Urine culture was sent and he was started on ceftriaxone.   His white blood cell count today is 11.8.   He was noted to have a blister over his right fifth metatarsal head and a chronic right pretibial wound.    4/24/24: He remains afebrile.  His urine culture was finalized as negative.  I have asked the laboratory to hold the urine culture for yeast but they did not and it was disposed of last night.   He states that his dysuria is improved today.  Feels better.     4/25/2024:  He remains afebrile.  White blood cell count today is 6.7.  Creatinine is 1.99.   Urinalysis yesterday revealed too numerous to count white blood cells.  He denied dysuria this morning.  After I saw him he suffered a V-fib cardiac arrest and was transferred to the ICU.  He is endotracheally intubated    4/26/24:   His maximum temperature over the last 24 hours is 99.5.  Repeat urine culture from 4/24 is pending.  He remains on ventilatory support.    Creatinine is 2.28.  ALT is  1028.  White  blood cell count is 9.95.  O2 saturation is 100% on an FiO2 of 30%.  Chest x-ray reveals no pulmonary infiltrates. \    Later this morning he was extubated.  He is confused.  He knows his name and his birthdate.  He cannot answer more complex questions.    4/27/2024:  His maximum temperature over the last 24 hours is 99.5.  His left ventricular ejection fraction is less than 20%.  His white blood cell count is 2.37.  ALT is down to 626.   Chest x-ray 4/27 reveals moderate hemoglobin airspace disease.   O2 saturation is 99% on 4 L.   He is on Zosyn therapy.   Later today his oxygen demand increased and he was placed   On BiPAP.     4/28/2024:   He has remained afebrile.   White blood cell count is 10.1. His creatinine is 2.65 ALT is 450.  His oxygen demand is down to 1 liter today. Chest x-ray today reveals interval improvement in the right upper lobe infiltrate.  He is coughing and has some sputum production.  He remains confused and did not recognize his wife and daughter today      4/29/2024: He remains afebrile.  His creatinine is 2.52.  ALT is 320.  White blood cell count is 9.3.    He is now on 1 L with an O2 saturation of 96%.  He is more alert and responsive today.  He is less confused.     4/30/2024: He has remained afebrile.  Creatinine is 2.68.  ALT is 218.   White blood cell count is 9.1.  room air. room air.  His confusion is improved today.  He recognized his wife today.  He denies increased cough and sputum production.  He knows me by name today.    5/1/2024:  He remains afebrile.  His creatinine today is 3.39.   White blood cell count is 8.9. O2 saturation is 97% on room air.      5/2/2024:  He remains afebrile.  His creatinine today is 3.3.  White blood cell count is 10.7.   Urine eosinophil smear was 0. He has continued to require vasopressor therapy.   Echocardiogram from yesterday reveals a 21-25% ejection fraction.   He feels better today.  His daughter indicates that  is cognitive function has  improved and he is no longer confused.    5/3/2024:  Maximum temperature over the last 24 hours is 99.4.  Creatinine is 3.38.    ALT is 106.   white blood cell count is 10.4.  O2 saturation is 94% on room air.  He denies increased dyspnea.  He is still requiring low-dose vasopressor support.    5/4/24: History reviewed.  I am following the patient for Dr. Lincoln Padilla over the weekend. The patient denies any worsening shortness of breath.  Breathing is nonlabored on room air.  He has not had any fevers.  He is now off of antibiotics since 5/1.  Creatinine was downtrending to 2.95, from 3.38.  White blood count is normal at 9.42.  He denies any nausea, vomiting, or diarrhea.  He denies peripheral edema.  Right lower extremity with Unna boot in place.    5/5/24: Patient had a temperature up to 100.2°F today.  No shaking chills.  Fonseca catheter remains in place.  No abdominal pain.  No worsening shortness of breath or productive cough.  No new rashes reported.  No increased leg pain.  He denies any diarrhea.    5/6/2024: His maximum temperature over the last 24 hours is 100.2.  He had a low-grade fever to 99.7 this morning.  Chest x-ray reveals mild left basilar atelectasis.  Creatinine is 2.83.  White blood cell count is 7.9.  ProBNP is greater than 70,000.  He denies increased dyspnea, cough, sputum production.  He still has a Fonseca catheter in place    Past Medical History:   Diagnosis Date    Allergic     Arthritis     Asthma     Coronary artery disease     Diabetes mellitus 2000    started on inuslin 12/2018; started on po meds in 2000; checking blood sugars daily     Diabetic foot infection 07/23/2023    Disease of thyroid gland     po meds daily for hypothyroidism     Elevated serum creatinine 11/15/2022    Elevated troponin 10/02/2022    Generalized weakness 11/15/2022    History of fracture as a child     rt leg- severe     Hyperlipidemia     Hypertension     Hypothyroidism     PAF (paroxysmal atrial  fibrillation) 07/23/2023    Peripheral neuropathy     Peripheral vascular disease     s/p angiogram 2/19-needs stent in left leg     Pleural effusion, bilateral 11/15/2022    Proximal phalanx fracture of the second digit extending into the second metatarsal joint 07/28/2022    RBBB     Right knee pain     Status post amputation of great toe and second toe of left foot 07/23/2023    Status post amputation of great toe, left 11/15/2022    Unstable angina 02/12/2019    Added automatically from request for surgery [id number]    Vitamin D deficiency        Past Surgical History:   Procedure Laterality Date    AMPUTATION DIGIT Left 01/17/2023    Procedure: AMPUTATION DIGIT LEFT;  Surgeon: Gopal Márquez MD;  Location:  [facility];  Service: Vascular;  Laterality: Left;    APPENDECTOMY      CARDIAC CATHETERIZATION N/A 02/14/2019    Procedure: Left Heart Cath;  Surgeon: Cooper Apodaca MD;  Location: [facility];  Service: Cardiology    CARDIAC ELECTROPHYSIOLOGY PROCEDURE N/A 05/18/2022    Procedure: DEVICE IMPLANT;  Surgeon: Cooper Apodaca MD;  Location: [facility];  Service: Cardiology;  Laterality: N/A;    CARDIAC SURGERY      COLONOSCOPY      CORONARY ARTERY BYPASS GRAFT N/A 03/22/2019    Procedure: MEDIAN STERNOTOMY, CORONARY ARTERY BYPASS GRAFT X3, UTILIZING THE LEFT INTERNAL MAMMARY ARTERY, EVH AND OPEN HARVEST OF THE RIGHT GREATER SAPHENOUS VEIN, EXPLORATION OF THE LEFT LEG;  Surgeon: Ap Au MD;  Location: [facility];  Service: Cardiothoracic    EYE SURGERY Bilateral     cataracts     FRACTURE SURGERY      INTERVENTIONAL RADIOLOGY PROCEDURE N/A 07/29/2021    Procedure: Abdominal Aortagram with Runoff;  Surgeon: Jermaine Hernandez MD;  Location: [facility];  Service: Cardiovascular;  Laterality: N/A;    KNEE ARTHROSCOPY      right x 2, left x 1    LACERATION REPAIR      right leg    LEG SURGERY      2 for fracture of rt leg     TONSILLECTOMY       Adnoidectomy    TRANS METATARSAL AMPUTATION Left 08/02/2022    Procedure: GREAT TOE AMPUTATION LEFT;  Surgeon: Gopal Márquez MD;  Location: [facility];  Service: Vascular;  Laterality: Left;       Family History   Problem Relation Age of Onset    Coronary artery disease Mother     Diabetes Mother     Hyperlipidemia Mother     Cancer Father        Social History     Socioeconomic History    Marital status:     Number of children: 2   Tobacco Use    Smoking status: Never     Passive exposure: Past    Smokeless tobacco: Never   Vaping Use    Vaping status: Never Used   Substance and Sexual Activity    Alcohol use: Not Currently     Comment: every 2-3 months    Drug use: No    Sexual activity: Not Currently     Partners: Female       Allergies   Allergen Reactions    Vancomycin Other (See Comments)     Kidney failure per last admission         Medication:    Current Facility-Administered Medications:     acetaminophen (TYLENOL) tablet 650 mg, 650 mg, Oral, Q6H PRN, Maricel Peraza APRN, 650 mg at 05/05/24 2351    albuterol (PROVENTIL) nebulizer solution 0.083% 2.5 mg/3mL, 2.5 mg, Nebulization, BID PRN, Nathalia Mckeon DNP, APRN, 2.5 mg at 04/30/24 0755    apixaban (ELIQUIS) tablet 2.5 mg, 2.5 mg, Oral, Q12H, Antonio Angeles MD, 2.5 mg at 05/06/24 0806    aspirin chewable tablet 81 mg, 81 mg, Oral, Daily, Maricel Peraza, APRN, 81 mg at 05/06/24 0806    atorvastatin (LIPITOR) tablet 20 mg, 20 mg, Oral, Nightly, Golden Madison MD, 20 mg at 05/05/24 2146    castor oil-balsam peru (VENELEX) ointment 1 Application, 1 Application, Topical, Daily, Eyad Ledesma MD, 1 Application at 05/06/24 0806    dextrose (D50W) (25 g/50 mL) IV injection 10-50 mL, 10-50 mL, Intravenous, Q15 Min PRN, Jose Ramon Murphy MD    dextrose (GLUTOSE) oral gel 15 g, 15 g, Oral, Q15 Min PRN, Jose Ramon Murphy MD    furosemide (LASIX) injection 80 mg, 80 mg, Intravenous, Daily, Blake Tilley MD, 80 mg at 05/06/24 0849     glucagon (GLUCAGEN) injection 1 mg, 1 mg, Intramuscular, Q15 Min PRN, Jose Ramon Murphy MD    HYDROcodone-acetaminophen (NORCO) 5-325 MG per tablet 1 tablet, 1 tablet, Oral, Q4H PRN, Antonio Angeles MD, 1 tablet at 05/05/24 1011    [COMPLETED] Hydrocortisone Sod Suc (PF) (Solu-CORTEF) injection 100 mg, 100 mg, Intravenous, Once, 100 mg at 05/04/24 0951 **FOLLOWED BY** Hydrocortisone Sod Suc (PF) (Solu-CORTEF) injection 50 mg, 50 mg, Intravenous, Q8H, Fran Flores MD    insulin glargine (LANTUS, SEMGLEE) injection 1-200 Units, 1-200 Units, Subcutaneous, Nightly - Glucommander, Jose Ramon Murphy MD, 13 Units at 05/05/24 2146    insulin lispro (humaLOG) injection 1-200 Units, 1-200 Units, Subcutaneous, 4x Daily With Meals & Nightly, Jose Ramon Murphy MD, 4 Units at 05/06/24 1152    insulin lispro (humaLOG) injection 1-200 Units, 1-200 Units, Subcutaneous, PRN, Jose Ramon Murphy MD, 4 Units at 05/05/24 1751    levothyroxine (SYNTHROID, LEVOTHROID) tablet 88 mcg, 88 mcg, Oral, Q AM, Maricel Peraza APRN, 88 mcg at 05/06/24 0537    Magnesium Low Dose Replacement - Follow Nurse / BPA Driven Protocol, , Does not apply, PRN, Maricel Peraza APRN    midodrine (PROAMATINE) tablet 10 mg, 10 mg, Oral, TID AC, Golden Madison MD, 10 mg at 05/06/24 0537    nitroglycerin (NITROSTAT) SL tablet 0.4 mg, 0.4 mg, Sublingual, Q5 Min PRN, Maricel Peraza APRN    norepinephrine (LEVOPHED) 8 mg in 250 mL NS infusion (premix), 0.02-0.3 mcg/kg/min, Intravenous, Titrated, Eyad Ledesma MD, Stopped at 05/05/24 1242    pantoprazole (PROTONIX) EC tablet 40 mg, 40 mg, Oral, Q AM, Antonio Angeles MD, 40 mg at 05/06/24 0537    polyethylene glycol (MIRALAX) packet 17 g, 17 g, Oral, Daily, WestArtur MD, 17 g at 05/02/24 1037    prochlorperazine (COMPAZINE) injection 5 mg, 5 mg, Intravenous, Q6H PRN, Maricel Peraza, APRN, 2.5 mg at 04/26/24 1316    sennosides-docusate (PERICOLACE) 8.6-50 MG per tablet 2  tablet, 2 tablet, Oral, BID, 2 tablet at 24 **AND** [DISCONTINUED] polyethylene glycol (MIRALAX) packet 17 g, 17 g, Oral, Daily PRN, 17 g at 24 **AND** [DISCONTINUED] bisacodyl (DULCOLAX) EC tablet 5 mg, 5 mg, Oral, Daily PRN, 5 mg at 24 0956 **AND** [DISCONTINUED] bisacodyl (DULCOLAX) suppository 10 mg, 10 mg, Rectal, Daily PRN, Antonio Angeles MD    sodium chloride 0.9 % flush 10 mL, 10 mL, Intravenous, PRN, Case, Hailee V., DO    sodium chloride 0.9 % flush 10 mL, 10 mL, Intravenous, Q12H, Case, Hailee V., DO, 10 mL at 24 0806    tamsulosin (FLOMAX) 24 hr capsule 0.4 mg, 0.4 mg, Oral, Daily, Maricel Peraza APRN, 0.4 mg at 24 0806    Antibiotics:  Anti-Infectives (From admission, onward)      Ordered     Dose/Rate Route Frequency Start Stop    24 1356  piperacillin-tazobactam (ZOSYN) 3.375 g IVPB in 100 mL NS MBP (CD)        Ordering Provider: Eyad Ledesma MD    3.375 g  over 30 Minutes Intravenous Once 24 1445 24 1557              Review of Systems:  See HPI      Physical Exam:   Vital Signs  Temp (24hrs), Av.7 °F (37.1 °C), Min:98 °F (36.7 °C), Max:99.7 °F (37.6 °C)    Temp  Min: 98 °F (36.7 °C)  Max: 99.7 °F (37.6 °C)  BP  Min: 78/68  Max: 117/80  Pulse  Min: 61  Max: 88  Resp  Min: 16  Max: 20  SpO2  Min: 92 %  Max: 100 %    GENERAL:   Alert and responsive. Sitting up in bed  HEENT: Normocephalic, atraumatic.  No external oral lesions noted  NECK: Supple   HEART:  irregular rate, No murmur   LUNGS:   Diminished in the lung bases.  Nonlabored breathing on room air  ABDOMEN: Soft, nontender, nondistended. No rebound or guarding. NO mass or HSM.  EXT: Right lower extremity with Unna boot in place.  No peripheral edema or cellulitic change noted over his left lower extremity.  :   Fonseca catheter in place.   MSK: No joint effusions or erythema  SKIN: No generalized rashes noted.  No peripheral stigmata of infective endocarditis  "noted.  NEURO:  Awake alert and oriented.  Normal speech and cognition.  Right upper extremity PICC line site is without any erythema    Laboratory Data    Results from last 7 days   Lab Units 05/06/24  0400 05/05/24  0523 05/04/24  0528   WBC 10*3/mm3 7.91 6.61 9.42   HEMOGLOBIN g/dL 8.4* 9.0* 9.2*   HEMATOCRIT % 26.1* 27.6* 28.1*   PLATELETS 10*3/mm3 207 187 238     Results from last 7 days   Lab Units 05/06/24  0400   SODIUM mmol/L 137   POTASSIUM mmol/L 4.3   CHLORIDE mmol/L 104   CO2 mmol/L 20.0*   BUN mg/dL 77*   CREATININE mg/dL 2.83*   GLUCOSE mg/dL 252*   CALCIUM mg/dL 8.1*     Results from last 7 days   Lab Units 05/05/24  0523   ALK PHOS U/L 75   BILIRUBIN mg/dL 0.5   ALT (SGPT) U/L 65*   AST (SGOT) U/L 11                         Estimated Creatinine Clearance: 26.1 mL/min (A) (by C-G formula based on SCr of 2.83 mg/dL (H)).      Microbiology:  No results found for: \"ACANTHNAEG\", \"AFBCX\", \"BPERTUSSISCX\", \"BLOODCX\"  No results found for: \"BCIDPCR\", \"CXREFLEX\", \"CSFCX\", \"CULTURETIS\"  No results found for: \"CULTURES\", \"HSVCX\", \"URCX\"  No results found for: \"EYECULTURE\", \"GCCX\", \"HSVCULTURE\", \"LABHSV\"  No results found for: \"LEGIONELLA\", \"MRSACX\", \"MUMPSCX\", \"MYCOPLASCX\"  No results found for: \"NOCARDIACX\", \"STOOLCX\"  Urine Culture   Date Value Ref Range Status   04/22/2024 No growth  Preliminary     No results found for: \"VIRALCULTU\", \"WOUNDCX\"        Radiology:  Imaging Results (Last 72 Hours)       Procedure Component Value Units Date/Time    XR Chest 1 View [[id number]] Collected: 05/06/24 0715     Updated: 05/06/24 0720    Narrative:      XR CHEST 1 VW    Date of Exam: 5/6/2024 3:02 AM EDT    Indication: Heart Failure    Comparison: 4/28/2024    Findings:  Patient is status post median sternotomy and CABG. Left chest wall pacemaker is present. There is a right arm PICC which terminates overlying the SVC. There is a small left pleural effusion with left basilar atelectasis. Lungs appear otherwise " adequately   aerated. Cardiac silhouette is magnified. No obvious pneumothorax. Osseous structures are unchanged.          Impression:      Impression:  1.Possible small left pleural effusion. Mild left basilar atelectasis.      Electronically Signed: Lucio Lanier MD    5/6/2024 7:17 AM EDT    Workstation ID: [id number]        I read his chest x-ray of 5/6      Impression:    1.    Right upper lobe aspiration pneumonia-he is S/P Zosyn therapy.  He is clinically and radiographically improved.  Zosyn was discontinued on 5/1   2.  V-fib arrest-status post resuscitation.    3.   Acute kidney injury-superimposed on stage IIIb chronic kidney disease.   4.  Chronic right pretibial dermatitis-without evidence of cellulitis  5.  Type 2 diabetes mellitus  6.  coronary artery disease/status post CABG, 3/22/2019  7.  Severe systolic heart failure- with some improvement on his echocardiogram of 5/1.  8.  Stage IIIb chronic kidney disease  9.  Paroxysmal atrial fibrillation  10.  Acute hypoxic respiratory failure- improved   11.  Ischemic hepatitis- improved  12.  Pyuria- with a negative urine culture.  13.  Anoxic encephalopathy- improved  14.  Right fifth metatarsal wound/bullous lesion  15.  Fever-low-grade.  This may be secondary to atelectasis  I would like to have his Fonseca catheter removed in order to reduce his risk for Fonseca catheter related UTI.    PLAN/RECOMMENDATIONS:   1.  Continue with a right leg Unna boot  2.  Continue off of antibiotic therapy  3.  Remove the Fonseca catheter     I discussed his clinical situation with Dr. Flores and with Dr. Ponce.        Lincoln Padilla MD  5/6/2024  12:42 EDT

## 2024-05-06 NOTE — PROGRESS NOTES
Pulmonary/Critical Care Follow-up     LOS: 13 days   Patient Care Team:  Marysol Shrestha MD as PCP - General (Internal Medicine)  Cooper Apodaca MD as Consulting Physician (Cardiology)    Chief Complaint/reason: Cardiac arrest      Subjective     Interval History:   Patient is doing well.  No specific complaints.  Ate breakfast.  Looking forward to getting some [employer].  No fevers or chills.  Denies dyspnea.    History taken from: Patient, staff    PMH/FH/Social History were reviewed and updated appropriately in the electronic medical record.     Review of Systems:    Review of 14 systems was completed with positives and pertinent negatives noted in the subjective section.  All other systems reviewed and are negative.         Objective     Vital Signs  Temp:  [98 °F (36.7 °C)-99.7 °F (37.6 °C)] 99.7 °F (37.6 °C)  Heart Rate:  [61-88] 71  Resp:  [16-20] 18  BP: ()/(57-81) 106/79  05/05 0701 - 05/06 0700  In: 503.4 [P.O.:480; I.V.:23.4]  Out: 665 [Urine:665]  Body mass index is 24.13 kg/m².     IV drips:  norepinephrine, Last Rate: Stopped (05/05/24 1242)       Physical Exam:     Constitutional:   Alert, in no acute distress   Head:   Normocephalic, atraumatic   Eyes:           Lids and lashes normal, conjunctivae and sclerae normal.  PER   ENMT:  Ears appear intact with no abnormalities noted     Lips normal.     Neck:  Trachea midline, no JVD   Lungs/Resp:    Normal effort, symmetric chest rise, no crepitus, minimal rales and slightly diminished breath sounds at bases.          Heart/CV:   Regular rhythm and normal rate, no murmur   Abdomen/GI:    Soft, nontender, nondistended   :    Deferred   Extremities/MSK:  No clubbing or cyanosis.  Trace bilateral lower extremity   Pulses:  Pulses palpable and equal bilaterally   Skin:  No bleeding, bruising or rash   Heme/Lymph:  No cervical or supraclavicular adenopathy.   Neurologic:    Psychiatric:    Moves all extremities with no obvious focal motor  deficit.  Cranial nerves 2 - 12 grossly intact  Non-agitated, normal affect.    The above physical exam findings were reviewed and reflect my exam findings as of today's exam.   Electronically signed by:  Fran Flores MD  05/06/24  12:26 EDT      Results Review:     I reviewed the patient's new clinical results.   Results from last 7 days   Lab Units 05/06/24 0400 05/05/24 0523 05/04/24 0528 05/03/24  0441   SODIUM mmol/L 137 142 142 140   POTASSIUM mmol/L 4.3 4.1 4.0 4.2   CHLORIDE mmol/L 104 109* 110* 109*   CO2 mmol/L 20.0* 18.0* 20.0* 18.0*   BUN mg/dL 77* 71* 72* 79*   CREATININE mg/dL 2.83* 2.81* 2.95* 3.38*   CALCIUM mg/dL 8.1* 8.2* 8.0* 7.9*   BILIRUBIN mg/dL  --  0.5 0.4 0.4   ALK PHOS U/L  --  75 77 74   ALT (SGPT) U/L  --  65* 84* 106*   AST (SGOT) U/L  --  11 12 13   GLUCOSE mg/dL 252* 159* 104* 110*     Results from last 7 days   Lab Units 05/06/24 0400 05/05/24 0523 05/04/24  0528   WBC 10*3/mm3 7.91 6.61 9.42   HEMOGLOBIN g/dL 8.4* 9.0* 9.2*   HEMATOCRIT % 26.1* 27.6* 28.1*   PLATELETS 10*3/mm3 207 187 238         Results from last 7 days   Lab Units 05/06/24 0400 05/04/24 0528 05/02/24  0433 04/30/24  0447   MAGNESIUM mg/dL  --  2.5* 2.8* 2.8*   PHOSPHORUS mg/dL 4.6* 4.5 4.9* 4.5     proBNP 5/6/2024: Greater than 70,000    I reviewed the patient's new imaging including images and reports.    Chest x-ray 5/6/2024 shows probable small bilateral pleural effusions and borderline cardiomegaly with otherwise no acute radiographic chest abnormality.    Medication Review:   apixaban, 2.5 mg, Oral, Q12H  aspirin, 81 mg, Oral, Daily  atorvastatin, 20 mg, Oral, Nightly  castor oil-balsam peru, 1 Application, Topical, Daily  fludrocortisone, 100 mcg, Oral, Daily  furosemide, 80 mg, Intravenous, Daily  hydrocortisone sodium succinate, 50 mg, Intravenous, Q6H  insulin glargine, 1-200 Units, Subcutaneous, Nightly - Glucommander  insulin lispro, 1-200 Units, Subcutaneous, 4x Daily With Meals &  Nightly  levothyroxine, 88 mcg, Oral, Q AM  midodrine, 10 mg, Oral, TID AC  pantoprazole, 40 mg, Oral, Q AM  polyethylene glycol, 17 g, Oral, Daily  senna-docusate sodium, 2 tablet, Oral, BID  sodium chloride, 10 mL, Intravenous, Q12H  tamsulosin, 0.4 mg, Oral, Daily      norepinephrine, 0.02-0.3 mcg/kg/min, Last Rate: Stopped (05/05/24 1242)        Assessment & Plan         Cardiac arrest    Type 2 diabetes mellitus with hyperglycemia    Hyperlipidemia LDL goal <70    Hypothyroidism (acquired)    Coronary artery disease involving native coronary artery of native heart with angina pectoris    Pacemaker    HFrEF (heart failure with reduced ejection fraction)    Anemia, chronic disease    Stage 3b chronic kidney disease    UTI (urinary tract infection)    Acute respiratory failure with hypoxia    Elevated troponin level not due myocardial infarction    79 y.o. with history of T2DM, PAF, SSS status post PPM placement, CHF, PVD, left diabetic foot infection, bilateral DVTs, on Eliquis, who presented to [facility] on 4/22/2024 with weakness and chronic right lower extremity wound.  UA was consistent with UTI.  He was started on Rocephin and admitted to the hospitalist service.    On 4/25/2024, he suffered a V-fib arrest and was intubated during CPR.  He was transferred to the intensive care unit.  He was able to be extubated on 4/26/2024.  After extubation, he had a tenuous respiratory status and family made him a DNR/DNI.  Patient was seen by infectious disease who managed antibiotics.  Patient has had ischemic hepatitis which has been improving.  He has required low-dose norepinephrine and midodrine.  LVEF 21 to 25%.    Plan:  1.  For hypotension: Multifactorial, cardiac with possible adrenal insufficiency.  Initiated on stress dose steroids with hydrocortisone and fludrocortisone on 5/4/2024.  I will go ahead and stop fludrocortisone and decrease hydrocortisone to 50 mg IV every 8 hours.  Continue midodrine.  Okay to  telemetry/hospitalist at this point.  2.  For T2DM: Insulin per Glucomander.  3.  For hypothyroidism: Levothyroxine.  4.  For PAF/history of DVT: Eliquis.  5.  For BPH: Tamsulosin.  6.  Okay to telemetry/hospitalist.  7.  Goals of care: Hospice referral made with patient/family meeting with hospice planned for today.  8.  For congestive heart failure: LVEF 21 to 25%.  Agree with diuresis as ordered by nephrology      MDM:    Problem(s) High due to: Acute or Chronic illness or injury that may poses a threat to life or bodily function  Data: High due to: Review of the result(s) of each unique test and Independent interpretation of a test performed by another physician/NPP (not separately reported)  Risk: Low risk of morbidity from additional diagnostic testing or treatment    High    Electronically signed by:    Fran Flores MD  05/06/24  12:26 EDT      *. Please note that portions of this note were completed with [employer] - a voice recognition program.

## 2024-05-06 NOTE — PLAN OF CARE
"Goal Outcome Evaluation:  Plan of Care Reviewed With: patient        Progress: no change  Outcome Evaluation: Palliative RN saw pt on 5/6 at 0910.  Pt. sitting up in bed asleep but roused to voice.  Asked how he was feeling and he stated he was fine.  He denied pain, nause and shortness of breath.  Dr. RIVERA Flores asked if it would be ok if she listened to his lungs and heart and he said, \"no\" then said it would be \"ok\".  He stated it didn't really matter anyway.  Family meeting this afternoon at 1400.  Palliative care to follow along for support, POC and ongoing GOC.       1300 Palliative IDT meeting: MARK Sue RN, CHPN; SAHRA Simmons, APRN ;RIVERA Flores MD.; JASWINDER Antunez RN, SORAYA; FELISHA Dale MDiv; RIVERA Olmos, [facility], [facility]  After hours, weekends and holidays, contact Palliative Provider by calling [phone].                                 "

## 2024-05-06 NOTE — PROGRESS NOTES
" [facility] - Progress Note    Jermaine Singh  1945  [id number]    05/06/24, 08:29 EDT      Chief Complaint: Following for cardiac arrest    Subjective:   Awake in bed, wife at bedside.  Off pressor support.  Reports no complaints of dyspnea, no chest pain.      Review of Systems:  Pertinent positives are listed above and in physical exam.  All others have been reviewed and are negative.    apixaban, 2.5 mg, Oral, Q12H  aspirin, 81 mg, Oral, Daily  atorvastatin, 20 mg, Oral, Nightly  castor oil-balsam peru, 1 Application, Topical, Daily  fludrocortisone, 100 mcg, Oral, Daily  furosemide, 80 mg, Intravenous, Daily  hydrocortisone sodium succinate, 50 mg, Intravenous, Q6H  insulin glargine, 1-200 Units, Subcutaneous, Nightly - Glucommander  insulin lispro, 1-200 Units, Subcutaneous, 4x Daily With Meals & Nightly  levothyroxine, 88 mcg, Oral, Q AM  midodrine, 10 mg, Oral, TID AC  pantoprazole, 40 mg, Oral, Q AM  polyethylene glycol, 17 g, Oral, Daily  senna-docusate sodium, 2 tablet, Oral, BID  sodium chloride, 10 mL, Intravenous, Q12H  tamsulosin, 0.4 mg, Oral, Daily        Objective:  Vitals:   height is 190 cm (74.8\") and weight is 87.1 kg (192 lb 0.3 oz). His bladder temperature is 98.8 °F (37.1 °C). His blood pressure is 108/64 and his pulse is 79. His respiration is 18 and oxygen saturation is 96%.     Intake/Output Summary (Last 24 hours) at 5/6/2024 0948  Last data filed at 5/6/2024 0800  Gross per 24 hour   Intake 503.4 ml   Output 705 ml   Net -201.6 ml       Physical Exam:  General:  WN, Appears stated age  CV:  Normal S1,S2. No murmur, rub, or gallop. + Pacemaker  Resp:  CTA Soy, equal, nonlabored.  Abd:  Soft, + BS, no organomegaly. Nontender to palpation.  Extrem:  RLE with wrap, trace LLE.           Results from last 7 days   Lab Units 05/06/24  0400   WBC 10*3/mm3 7.91   HEMOGLOBIN g/dL 8.4*   HEMATOCRIT % 26.1*   PLATELETS 10*3/mm3 207     Results from last 7 days   Lab Units " "05/06/24  0400 05/05/24  0523   SODIUM mmol/L 137 142   POTASSIUM mmol/L 4.3 4.1   CHLORIDE mmol/L 104 109*   CO2 mmol/L 20.0* 18.0*   BUN mg/dL 77* 71*   CREATININE mg/dL 2.83* 2.81*   CALCIUM mg/dL 8.1* 8.2*   BILIRUBIN mg/dL  --  0.5   ALK PHOS U/L  --  75   ALT (SGPT) U/L  --  65*   AST (SGOT) U/L  --  11   GLUCOSE mg/dL 252* 159*                   Results from last 7 days   Lab Units 05/06/24  0400   PROBNP pg/mL >70,000.0*       Tele: V paced      Assessment/Plan:  Cardiac arrest/VF arrest  Substantial substrate for VF arrest including ischemic heart disease with severely diffusely diseased LAD as well as newly identified severe LV dysfunction with EF <20%  Films reviewed by cards 4/25; \"Based on prebypass angiogram, I do not suspect readily \"intervenable\" lesions to correct ischemic substrate.  LAD is severely diffusely diseased and would not be suitable for percutaneous treatment.  Frankly, I am impressed that Dr. Au was able to bypass the LAD in 2019.\"  Recommend against emergent cardiac catheterization given his hemodynamic instability and unlikelihood of \"targets\" to correct ischemic substrate  Continue aspirin and statin  No beta-blocker due to hypotension, resume as pressor support weaned/BP tolerates  Discontinue IV amiodarone.  If recurrence of VT/VF, start lidocaine  Repeat echo reviewed     Severe LV dysfunction with EF <20%  Newly diagnosed.  Last LVEF 48% in 2022  Unclear whether this is related to stunning from VF arrest or whether this has been pre-existing his admission.  I suspect the latter as patient has had dwindling functional capacity  Presently with some element of cardiogenic shock which prohibits GDMT  SJ pacemaker interrogation reviewed  Repeat echo reviewed     Multifactorial shock  Cardiogenic, possibly septic  Improving        Type 2 diabetes mellitus not on insulin with hyperglycemia  Uncontrolled (untreated?) diabetes prior to admission  Has only not candidate for SGL 2 " inhibitor due to renal insufficiency  Start Glucomander protocol     Hyperlipidemia with goal LDL <70  LDL well-controlled  Continue to hold statin although LFTs are improving.     Stage IIIb CKD  Hold losartan due to hypotension and anticipated [place] from cardiac event       History of DVT  Dose adjust Eliquis restarted.     Paroxysmal Atrial Fibrillation  [place] device interrogated Friday, reviewed.  Report on  chart.  Dose adjust NOAC resumed        Again, Cardiology/Nephrology have spoken with daughter in length.  Patient's medical status is terminal.  Continue current support.  Palliative Care appropriate and daughter understands their role in care of her father. Will consult Palliative services.      I discussed the patient's findings and my recommendations with the patient, any present family members, and the nursing staff.  Cooper Apodaca MD saw and examined patient, verified hx and PE, read all radiographic studies, reviewed labs and micro data, and formulated dx, plan for treatment and all medical decision making.      Megan Shukla PA-C  05/06/24, 09:49 EDT

## 2024-05-06 NOTE — PROGRESS NOTES
"   LOS: 13 days    Patient Care Team:  Marysol Shrestha MD as PCP - General (Internal Medicine)  Cooper Apodaca MD as Consulting Physician (Cardiology)    Subjective     Stable overnight    Objective     Vital Signs:  Blood pressure 108/64, pulse 79, temperature 98.8 °F (37.1 °C), temperature source Bladder, resp. rate 18, height 190 cm (74.8\"), weight 87.1 kg (192 lb 0.3 oz), SpO2 96%.      Intake/Output Summary (Last 24 hours) at 5/6/2024 0934  Last data filed at 5/6/2024 0800  Gross per 24 hour   Intake 503.4 ml   Output 705 ml   Net -201.6 ml        05/05 0701 - 05/06 0700  In: 503.4 [P.O.:480; I.V.:23.4]  Out: 665 [Urine:665]    Physical Exam:        GENERAL: WD WM NAD  NEURO: Awake and alert, oriented. No focal deficit  PSYCHIATRIC: NMA. Cooperative with PE  EYE: PE, no icterus, no conjunctivitis  ENT: ommm, dentition intact,  Hearing intact  NECK: Supple , No JVD discernable,  Trachea midline  CV: No edema, RRR  LUNGS:  Quiet,  Nonlabored resp.  Symmetrical expansion  ABDOMEN: Nondistended, soft nontender.  : + Fonseca, no palp bladder  SKIN: Warm and dry without rash      Labs:  Results from last 7 days   Lab Units 05/06/24  0400 05/05/24  0523 05/04/24  0528   WBC 10*3/mm3 7.91 6.61 9.42   HEMOGLOBIN g/dL 8.4* 9.0* 9.2*   PLATELETS 10*3/mm3 207 187 238     Results from last 7 days   Lab Units 05/06/24  0400 05/05/24  0523 05/04/24  0528 05/03/24  0441 05/02/24  0433 05/01/24  0339 04/30/24  0447   SODIUM mmol/L 137 142 142 140 136   < > 140   POTASSIUM mmol/L 4.3 4.1 4.0 4.2 4.4   < > 4.3   CHLORIDE mmol/L 104 109* 110* 109* 107   < > 110*   CO2 mmol/L 20.0* 18.0* 20.0* 18.0* 17.0*   < > 17.0*   BUN mg/dL 77* 71* 72* 79* 82*   < > 82*   CREATININE mg/dL 2.83* 2.81* 2.95* 3.38* 3.30*   < > 2.68*   CALCIUM mg/dL 8.1* 8.2* 8.0* 7.9* 7.8*   < > 7.8*   PHOSPHORUS mg/dL 4.6*  --  4.5  --  4.9*  --  4.5   MAGNESIUM mg/dL  --   --  2.5*  --  2.8*  --  2.8*   ALBUMIN g/dL 3.3* 3.3* 2.8* 2.8* 2.8*   < > 3.0* "    < > = values in this interval not displayed.     Results from last 7 days   Lab Units 05/05/24  0523   ALK PHOS U/L 75   BILIRUBIN mg/dL 0.5   ALT (SGPT) U/L 65*   AST (SGOT) U/L 11                   Estimated Creatinine Clearance: 26.1 mL/min (A) (by C-G formula based on SCr of 2.83 mg/dL (H)).         A/P:    ARF: Creatinine stable at 2.8.  Urine output borderline nonoliguric.  Creatinine increased post acute MI with likely underlying hemodynamic injury with ATN along with prerenal azotemia due to poor cortical perfusion with cardiogenic shock..  For now continue supportive care.  Strict I's and O's.  Monitor renal function closely for further improvement.    CKD 3: Previous baseline creatinine 1.7-2.0.  Follows with Dr. Pal.    Cardiogenic shock: Patient on midodrine, fludrocortisone, hydrocortisone..  Weaned off pressors yesterday.  Continue to monitor for now.    Metabolic acidosis: Bicarb borderline.  Monitor for now.    Anemia: Hemoglobin below goal.  Transfuse as indicated for Hgb <7.    Volume: Negative daily on diuretics.  Monitor for euvolemia.  Patient with low oncotic pressure.  May have component of third spaced edema.  Give albumin as needed for fluid mobilization with diuresis.    CAD: Patient post V-fib arrest.  EF <20%.  Severe multivessel disease not amendable to surgery or percutaneous intervention.  Cardiology following.    High risk and complexity patient.      Farooq Ponce MD  05/06/24  09:34 EDT

## 2024-05-06 NOTE — THERAPY WOUND CARE TREATMENT
Acute Care - Wound/Debridement Treatment Note  [facility]     Patient Name: Jermaine Singh  : 1945  MRN: [id number]  Today's Date: 2024                Admit Date: 2024    Visit Dx:    ICD-10-CM ICD-9-CM   1. Acute UTI  N39.0 599.0   2. Generalized weakness  R53.1 780.79   3. Edema of right lower extremity  R60.0 782.3   4. Leg erythema  L53.9 695.9   5. Chronic kidney disease, unspecified CKD stage  N18.9 585.9   6. Elevated troponin  R79.89 790.6   7. Hyperkalemia  E87.5 276.7   8. Cardiac arrest  I46.9 427.5   9. Oropharyngeal dysphagia  R13.12 787.22     Anterior RLE      Patient Active Problem List   Diagnosis    Type 2 diabetes mellitus with hyperglycemia    Hyperlipidemia LDL goal <70    Hypothyroidism (acquired)    Vitamin D deficiency on Rx     Diabetic neuropathy    Coronary artery disease involving native coronary artery of native heart with angina pectoris    Atherosclerosis of native artery of both lower extremities with intermittent claudication    Pacemaker    HFrEF (heart failure with reduced ejection fraction)    DVT, bilateral lower limbs    Cellulitis of right lower extremity    Anemia, chronic disease    PVD (peripheral vascular disease)    Diabetic foot ulcer    GERD without esophagitis    Stage 3b chronic kidney disease    Right inguinal hernia    PAF (paroxysmal atrial fibrillation)    Moderate malnutrition    UTI (urinary tract infection)    Cardiac arrest    Acute respiratory failure with hypoxia    History of DVT (deep vein thrombosis)    Elevated troponin level not due myocardial infarction        Past Medical History:   Diagnosis Date    Allergic     Arthritis     Asthma     Coronary artery disease     Diabetes mellitus 2000    started on inuslin 2018; started on po meds in ; checking blood sugars daily     Diabetic foot infection 2023    Disease of thyroid gland     po meds daily for hypothyroidism     Elevated serum creatinine 11/15/2022    Elevated  troponin 10/02/2022    Generalized weakness 11/15/2022    History of fracture as a child     rt leg- severe     Hyperlipidemia     Hypertension     Hypothyroidism     PAF (paroxysmal atrial fibrillation) 07/23/2023    Peripheral neuropathy     Peripheral vascular disease     s/p angiogram 2/19-needs stent in left leg     Pleural effusion, bilateral 11/15/2022    Proximal phalanx fracture of the second digit extending into the second metatarsal joint 07/28/2022    RBBB     Right knee pain     Status post amputation of great toe and second toe of left foot 07/23/2023    Status post amputation of great toe, left 11/15/2022    Unstable angina 02/12/2019    Added automatically from request for surgery [id number]    Vitamin D deficiency         Past Surgical History:   Procedure Laterality Date    AMPUTATION DIGIT Left 01/17/2023    Procedure: AMPUTATION DIGIT LEFT;  Surgeon: Gopal Márquez MD;  Location:  [facility];  Service: Vascular;  Laterality: Left;    APPENDECTOMY      CARDIAC CATHETERIZATION N/A 02/14/2019    Procedure: Left Heart Cath;  Surgeon: Cooper Apodaca MD;  Location:  [facility]IVE [facility]ATION;  Service: Cardiology    CARDIAC ELECTROPHYSIOLOGY PROCEDURE N/A 05/18/2022    Procedure: DEVICE IMPLANT;  Surgeon: Cooper Apodaca MD;  Location:  [facility] LOCATION;  Service: Cardiology;  Laterality: N/A;    CARDIAC SURGERY      COLONOSCOPY      CORONARY ARTERY BYPASS GRAFT N/A 03/22/2019    Procedure: MEDIAN STERNOTOMY, CORONARY ARTERY BYPASS GRAFT X3, UTILIZING THE LEFT INTERNAL MAMMARY ARTERY, EVH AND OPEN HARVEST OF THE RIGHT GREATER SAPHENOUS VEIN, EXPLORATION OF THE LEFT LEG;  Surgeon: Ap Au MD;  Location:  [facility];  Service: Cardiothoracic    EYE SURGERY Bilateral     cataracts     FRACTURE SURGERY      INTERVENTIONAL RADIOLOGY PROCEDURE N/A 07/29/2021    Procedure: Abdominal Aortagram with Runoff;  Surgeon: Jermaine Hernandez MD;  Location:  [facility]  LOCATION;  Service: Cardiovascular;  Laterality: N/A;    KNEE ARTHROSCOPY      right x 2, left x 1    LACERATION REPAIR      right leg    LEG SURGERY      2 for fracture of rt leg     TONSILLECTOMY      Adnoidectomy    TRANS METATARSAL AMPUTATION Left 08/02/2022    Procedure: GREAT TOE AMPUTATION LEFT;  Surgeon: Gopal Márquez MD;  Location: Replaced by [facility];  Service: Vascular;  Laterality: Left;           Wound 04/22/24 2240 Left anterior third toe (Active)   Dressing Appearance open to air 05/06/24 0800   Closure Open to air 05/06/24 1000   Base pink;red;clean 05/06/24 1000       Wound 04/22/24 2240 Right posterior plantar Blisters (Active)   Dressing Appearance dry;intact 05/06/24 0800   Closure Unable to assess 05/06/24 1000   Base dressing in place, unable to visualize 05/06/24 1000       Wound 04/22/24 2240 Right lower leg Other (comment) (Active)   Wound Image   05/06/24 0819   Dressing Appearance intact;moist drainage 05/06/24 0819   Closure Unable to assess 05/06/24 1000   Base dressing in place, unable to visualize 05/06/24 1000   Periwound blanchable;dry;warm 05/06/24 0819   Periwound Temperature warm 05/06/24 0819   Periwound Skin Turgor soft 05/06/24 0819   Edges open;irregular 05/06/24 0819   Wound Length (cm) 8.9 cm 05/06/24 0819   Wound Width (cm) 1 cm 05/06/24 0819   Wound Depth (cm) 0.1 cm 05/06/24 0819   Wound Surface Area (cm^2) 8.9 cm^2 05/06/24 0819   Wound Volume (cm^3) 0.89 cm^3 05/06/24 0819   Drainage Characteristics/Odor serosanguineous 05/06/24 0819   Drainage Amount small 05/06/24 0819   Care, Wound cleansed with;soap and water;debrided;rosendo boot 05/06/24 0819   Dressing Care dressing applied 05/06/24 0819   Periwound Care cleansed with pH balanced cleanser;dry periwound area maintained 05/06/24 0819       Wound 04/27/24 2130 Bilateral gluteal MASD (Moisture associated skin damage) (Active)   Dressing Appearance dry;intact 05/06/24 1000   Closure None 05/06/24 1000   Base dressing in place,  "unable to visualize 05/06/24 1000      Lymphedema       Row Name 05/06/24 1000             Lymphedema Edema Assessment    Ptting Edema Category By severity  -      Pitting Edema Mild  -         Skin Changes/Observations    Location/Assessment Lower Extremity  -      Lower Extremity Conditions right:;clean;dry  -      Lower Extremity Color/Pigment right:;normal  -         Lymphedema Pulses/Capillary Refill    Capillary Refill lower extremity capillary refill  -      Lower Extremity Capillary Refill right:;left:;less than 3 seconds  -         Compression/Skin Care    Compression/Skin Care skin care;wrapping location;bandaging  -      Skin Care washed/dried;lotion applied  -      Wrapping Location lower extremity  -      Wrapping Location LE right:;foot to knee  -      Wrapping Comments RLE Unna boot applied in clamshell pattern, 4\" coban, size 5 spandage to secure.  -                User Key  (r) = Recorded By, (t) = Taken By, (c) = Cosigned By      Initials Name Provider Type     Viet Pollock, PT Physical Therapist                    WOUND DEBRIDEMENT  Total area of Debridement: 4cm2  Debridement Site 1  Location- Site 1: RLE wound  Selective Debridement- Site 1: Wound Surface <20cmsq  Instruments- Site 1: tweezers  Excised Tissue Description- Site 1: minimum, slough, moderate, other (comment) (periwound crusts)  Bleeding- Site 1: seeping, held pressure, 1 minute               PT Assessment (Last 12 Hours)       PT Evaluation and Treatment       Row Name 05/06/24 0819          Physical Therapy Time and Intention    Subjective Information complains of;weakness;fatigue  -     Document Type therapy note (daily note);wound care  -     Mode of Treatment physical therapy;individual therapy  -       Row Name 05/06/24 0819          General Information    Patient Observations alert;cooperative;poorly cooperative  -       Row Name 05/06/24 0819          Pain    Pain Intervention(s) " Repositioned  -LH       Row Name 05/06/24 0819          Pain Scale: FACES Pre/Post-Treatment    Pain: FACES Scale, Pretreatment 2-->hurts little bit  -LH     Posttreatment Pain Rating 2-->hurts little bit  -LH     Pain Location generalized  -LH     Pain Location - back  -LH       Row Name 05/06/24 0819          Cognition    Affect/Mental Status (Cognition) WFL  -LH     Orientation Status (Cognition) oriented x 3  -LH       Row Name             Wound 04/22/24 2240 Left anterior third toe    Wound - Properties Group Placement Date: 04/22/24  -BS Placement Time: 2240  -BS Side: Left  -BS Orientation: anterior  -BS Location: third toe  -BS    Retired Wound - Properties Group Placement Date: 04/22/24  -BS Placement Time: 2240  -BS Side: Left  -BS Orientation: anterior  -BS Location: third toe  -BS    Retired Wound - Properties Group Date first assessed: 04/22/24  -BS Time first assessed: 2240  -BS Side: Left  -BS Location: third toe  -BS      Row Name             Wound 04/22/24 2240 Right posterior plantar Blisters    Wound - Properties Group Placement Date: 04/22/24  -BS Placement Time: 2240  -BS Side: Right  -BS Orientation: posterior  -BS Location: plantar  -BS Primary Wound Type: Blisters  -BS    Retired Wound - Properties Group Placement Date: 04/22/24  -BS Placement Time: 2240  -BS Side: Right  -BS Orientation: posterior  -BS Location: plantar  -BS Primary Wound Type: Blisters  -BS    Retired Wound - Properties Group Date first assessed: 04/22/24  -BS Time first assessed: 2240  -BS Side: Right  -BS Location: plantar  -BS Primary Wound Type: Blisters  -BS      Row Name 05/06/24 0819          Wound 04/22/24 2240 Right lower leg Other (comment)    Wound - Properties Group Placement Date: 04/22/24  -BS Placement Time: 2240  -BS Side: Right  -BS Orientation: lower  -BS Location: leg  -BS Primary Wound Type: Other  -BS    Wound Image Images linked: 1  -LH     Dressing Appearance intact;moist drainage  -LH     Base  moist;pink;red;granulating;yellow;slough  -     Periwound blanchable;dry;warm  -     Periwound Temperature warm  -     Periwound Skin Turgor soft  -     Edges open;irregular  -     Wound Length (cm) 8.9 cm  -     Wound Width (cm) 1 cm  -     Wound Depth (cm) 0.1 cm  partially obscured  -     Wound Surface Area (cm^2) 8.9 cm^2  -LH     Wound Volume (cm^3) 0.89 cm^3  -     Drainage Characteristics/Odor serosanguineous  -     Drainage Amount small  -     Care, Wound cleansed with;soap and water;debrided;rosendo boot  -     Dressing Care dressing applied  UB only  -LH     Periwound Care cleansed with pH balanced cleanser;dry periwound area maintained  -     Retired Wound - Properties Group Placement Date: 04/22/24  -BS Placement Time: 2240  -BS Side: Right  -BS Orientation: lower  -BS Location: leg  -BS Primary Wound Type: Other  -BS    Retired Wound - Properties Group Date first assessed: 04/22/24  -BS Time first assessed: 2240  -BS Side: Right  -BS Location: leg  -BS Primary Wound Type: Other  -BS      Row Name             Wound 04/27/24 2130 Bilateral gluteal MASD (Moisture associated skin damage)    Wound - Properties Group Placement Date: 04/27/24  -AM Placement Time: 2130  -AM Side: Bilateral  -AM Location: gluteal  -AM Primary Wound Type: MASD  -AM    Retired Wound - Properties Group Placement Date: 04/27/24  -AM Placement Time: 2130  -AM Side: Bilateral  -AM Location: gluteal  -AM Primary Wound Type: MASD  -AM    Retired Wound - Properties Group Date first assessed: 04/27/24  -AM Time first assessed: 2130  -AM Side: Bilateral  -AM Location: gluteal  -AM Primary Wound Type: MASD  -AM      Row Name 05/06/24 0819          Coping    Observed Emotional State calm;cooperative  -     Verbalized Emotional State acceptance  -     Trust Relationship/Rapport care explained;questions answered  -     Family/Support Persons family  -     Involvement in Care at bedside;attentive to  patient;interacting with patient  -       Row Name 05/06/24 0819          Plan of Care Review    Plan of Care Reviewed With patient;family  -     Progress improving  -     Outcome Evaluation PT changed pt's RLE Unna boot dressing this date. Pt's anterior RLE wound demonstrating good improvements in re-epithelialization of wound edges with reduced wound dimensions and improving periwound skin integrity. Pt with well-maintained RLE edema with use of Unna boot. Pt would continue to benefit from further debridement and Unna boot changes every 2-3 days to further promote wound healing.  -       Row Name 05/06/24 0819          Positioning and Restraints    Pre-Treatment Position in bed  -     Post Treatment Position bed  -     In Bed supine;call light within reach;encouraged to call for assist  -               User Key  (r) = Recorded By, (t) = Taken By, (c) = Cosigned By      Initials Name Provider Type    Vivian Manning, RN Registered Nurse    Lv Darby RN Registered Nurse     Viet Pollock, PT Physical Therapist                  Physical Therapy Education       Title: PT OT SLP Therapies (In Progress)       Topic: Physical Therapy (In Progress)       Point: Mobility training (In Progress)       Learning Progress Summary             Patient Acceptance, E,TB, VU by  at 5/3/2024 1646    Acceptance, E, NR by  at 5/3/2024 1607    Acceptance, E, NR by  at 5/1/2024 1606    Acceptance, E, NR by  at 4/30/2024 1153    Acceptance, E, NR by  at 4/29/2024 1159    Eager, E, VU,DU,NR by  at 4/24/2024 1452    Comment: Reviewed safety/technique w/transfers, ambulation, HEP, PT POC    Acceptance, E, VU by KR at 4/23/2024 1120   Family Acceptance, E, NR by  at 5/3/2024 1607    Acceptance, E, NR by  at 4/30/2024 1153    Acceptance, E, NR by  at 4/29/2024 1159    Acceptance, E, VU by KR at 4/23/2024 1120                         Point: Home exercise program (In Progress)       Learning  Progress Summary             Patient Acceptance, E,TB, VU by EB at 5/3/2024 1646    Acceptance, E, NR by LH at 5/1/2024 1606    Acceptance, E, NR by LH at 4/30/2024 1153    Eager, E, VU,DU,NR by SS at 4/24/2024 1452    Comment: Reviewed safety/technique w/transfers, ambulation, HEP, PT POC   Family Acceptance, E, NR by LH at 4/30/2024 1153                         Point: Body mechanics (In Progress)       Learning Progress Summary             Patient Acceptance, E,TB, VU by EB at 5/3/2024 1646    Acceptance, E, NR by LH at 5/3/2024 1607    Acceptance, E, NR by LH at 5/1/2024 1606    Acceptance, E, NR by LH at 4/30/2024 1153    Acceptance, E, NR by LH at 4/29/2024 1159    Eager, E, VU,DU,NR by SS at 4/24/2024 1452    Comment: Reviewed safety/technique w/transfers, ambulation, HEP, PT POC    Acceptance, E, VU by KR at 4/23/2024 1120   Family Acceptance, E, NR by  at 5/3/2024 1607    Acceptance, E, NR by LH at 4/30/2024 1153    Acceptance, E, NR by LH at 4/29/2024 1159    Acceptance, E, VU by KR at 4/23/2024 1120                         Point: Precautions (In Progress)       Learning Progress Summary             Patient Acceptance, E,TB, VU by EB at 5/3/2024 1646    Acceptance, E, NR by LH at 5/3/2024 1607    Acceptance, E, NR by LH at 5/1/2024 1606    Acceptance, E, NR by LH at 4/30/2024 1153    Acceptance, E, NR by LH at 4/29/2024 1159    Eager, E, VU,DU,NR by SS at 4/24/2024 1452    Comment: Reviewed safety/technique w/transfers, ambulation, HEP, PT POC    Acceptance, E, VU by KR at 4/23/2024 1120   Family Acceptance, E, NR by LH at 5/3/2024 1607    Acceptance, E, NR by LH at 4/30/2024 1153    Acceptance, E, NR by LH at 4/29/2024 1159    Acceptance, E, VU by KR at 4/23/2024 1120                                         User Key       Initials Effective Dates Name Provider Type Discipline    EB 09/22/22 -  Howard Zhou, FRAN Registered Nurse Nurse    SS 06/01/21 -  Jenna Stanley, PT Physical Therapist PT    KR  12/30/22 -  Tiffanie Erwin, PT Physical Therapist PT     09/21/23 -  Bessie Servin, PT Physical Therapist PT                    Recommendation and Plan  Anticipated Equipment Needs at Discharge (PT): front wheeled walker  Anticipated Discharge Disposition (PT): inpatient rehabilitation facility  Planned Therapy Interventions (PT): balance training, bed mobility training, gait training, home exercise program, neuromuscular re-education, patient/family education, postural re-education, ROM (range of motion), strengthening, stretching, stair training, transfer training  Therapy Frequency (PT): daily  Plan of Care Reviewed With: patient, family   Progress: improving       Progress: improving  Outcome Evaluation: PT changed pt's RLE Unna boot dressing this date. Pt's anterior RLE wound demonstrating good improvements in re-epithelialization of wound edges with reduced wound dimensions and improving periwound skin integrity. Pt with well-maintained RLE edema with use of Unna boot. Pt would continue to benefit from further debridement and Unna boot changes every 2-3 days to further promote wound healing.  Plan of Care Reviewed With: patient, family            Time Calculation   PT Charges       Row Name 05/06/24 1008             Time Calculation    Start Time 0819  -      PT Goal Re-Cert Due Date 05/09/24  -         Untimed Charges    29580-Unna Boot 15  -LH      76316-Hjsoaxace debridement 10  -LH         Total Minutes    Untimed Charges Total Minutes 25  -LH       Total Minutes 25  -LH                User Key  (r) = Recorded By, (t) = Taken By, (c) = Cosigned By      Initials Name Provider Type     Viet Pollock, PT Physical Therapist                      Therapy Charges for Today       Code Description Service Date Service Provider Modifiers Qty    21647102222 HC ROXANNE DEBRIDE OPEN WOUND UP TO 20CM 5/6/2024 Viet Pollock, PT GP 1    31927449062 HC PT STAPPING UNNA BOOT 5/6/2024 Viet Pollock, PT GP 1               PT G-Codes  Outcome Measure Options: AM-PAC 6 Clicks Basic Mobility (PT)  AM-PAC 6 Clicks Score (PT): 12  AM-PAC 6 Clicks Score (OT): 12  Modified [place] Scale: 4 - Moderately severe disability.  Unable to walk without assistance, and unable to attend to own bodily needs without assistance.       Viet Pollock, PT  5/6/2024

## 2024-05-06 NOTE — PROGRESS NOTES
"Palliative Care Daily Progress Note     Referring:  Megan WINCHESTER  C/C: nothing specific    S: Medical record reviewed. Events noted.  Denied SOA or pain.  This morning remembered family meeting scheduled for the afternoon.      ROS:   Denied pain/SOA/N/V/D/C    O: Code Status:   Code Status and Medical Interventions:   Ordered at: 04/26/24 1353     Medical Intervention Limits:    NO intubation (DNI)    NO cardioversion     Level Of Support Discussed With:    Next of Kin (If No Surrogate)    Health Care Surrogate     Code Status (Patient has no pulse and is not breathing):    No CPR (Do Not Attempt to Resuscitate)     Medical Interventions (Patient has pulse or is breathing):    Limited Support      Advanced Directives: Advance Directive Status: Patient does not have advance directive   Goals of Care: Ongoing.   Palliative Performance Scale Score: 50%     /73 (BP Location: Left arm, Patient Position: Lying)   Pulse 88   Temp 98.8 °F (37.1 °C) (Bladder)   Resp 18   Ht 190 cm (74.8\")   Wt 87.1 kg (192 lb 0.3 oz)   SpO2 97%   BMI 24.13 kg/m²     Intake/Output Summary (Last 24 hours) at 5/6/2024 1119  Last data filed at 5/6/2024 0800  Gross per 24 hour   Intake 503.4 ml   Output 705 ml   Net -201.6 ml       Physical Exam:    General Appearance:    Alert, cooperative, NAD   HEENT:    NC/AT, EOMI, anicteric, MMM, face relaxed   Neck:   supple, trachea midline, no JVD   Lungs:     CTA bilat, diminished in bases; respirations regular, even     and unlabored    Heart:    RRR, normal S1 and S2, no M/R/G   Abdomen:     Normal bowel sounds, soft, nontender, nondistended   G/U:   Deferred   MSK/Extremities:   No clubbing , cyanosis or edema, No wasting   Pulses:   Pulses palpable and equal bilaterally   Skin:   Warm, dry, no mottling   Neurologic:   A/Ox3, cooperative, moves extremities x 4, no tremor, nl     tone   Psych:   Calm, appropriate       Current Medications:      Current Facility-Administered " Medications:     acetaminophen (TYLENOL) tablet 650 mg, 650 mg, Oral, Q6H PRN, Maricel Peraza W, APRN, 650 mg at 05/05/24 2351    albuterol (PROVENTIL) nebulizer solution 0.083% 2.5 mg/3mL, 2.5 mg, Nebulization, BID PRN, Nathalia Mckeon DNP, APRN, 2.5 mg at 04/30/24 0755    apixaban (ELIQUIS) tablet 2.5 mg, 2.5 mg, Oral, Q12H, Antonio Angeles MD, 2.5 mg at 05/06/24 0806    aspirin chewable tablet 81 mg, 81 mg, Oral, Daily, Maricel Peraza, APRN, 81 mg at 05/06/24 0806    atorvastatin (LIPITOR) tablet 20 mg, 20 mg, Oral, Nightly, Golden Madison MD, 20 mg at 05/05/24 2146    castor oil-balsam peru (VENELEX) ointment 1 Application, 1 Application, Topical, Daily, Eyad Ledesma MD, 1 Application at 05/06/24 0806    dextrose (D50W) (25 g/50 mL) IV injection 10-50 mL, 10-50 mL, Intravenous, Q15 Min PRN, Jose Ramon Murphy MD    dextrose (GLUTOSE) oral gel 15 g, 15 g, Oral, Q15 Min PRN, Jose Ramon Murphy MD    fludrocortisone tablet 100 mcg, 100 mcg, Oral, Daily, Jose Ramon Murphy MD, 100 mcg at 05/06/24 0806    furosemide (LASIX) injection 80 mg, 80 mg, Intravenous, Daily, Blake Tilley MD, 80 mg at 05/06/24 0806    glucagon (GLUCAGEN) injection 1 mg, 1 mg, Intramuscular, Q15 Min PRN, Jose Ramon Murphy MD    HYDROcodone-acetaminophen (NORCO) 5-325 MG per tablet 1 tablet, 1 tablet, Oral, Q4H PRN, Antonio Angeles MD, 1 tablet at 05/05/24 1011    [COMPLETED] Hydrocortisone Sod Suc (PF) (Solu-CORTEF) injection 100 mg, 100 mg, Intravenous, Once, 100 mg at 05/04/24 0951 **FOLLOWED BY** Hydrocortisone Sod Suc (PF) (Solu-CORTEF) injection 50 mg, 50 mg, Intravenous, Q6H, Jose Ramon Murphy MD, 50 mg at 05/06/24 1020    insulin glargine (LANTUS, SEMGLEE) injection 1-200 Units, 1-200 Units, Subcutaneous, Nightly - Glucommander, Jose Ramon Murphy MD, 13 Units at 05/05/24 2146    insulin lispro (humaLOG) injection 1-200 Units, 1-200 Units, Subcutaneous, 4x Daily With Meals & Nightly, Jose Ramon Murphy MD, 4 Units at 05/06/24  0806    insulin lispro (humaLOG) injection 1-200 Units, 1-200 Units, Subcutaneous, PRN, Jose Ramon Murphy MD, 4 Units at 05/05/24 1751    levothyroxine (SYNTHROID, LEVOTHROID) tablet 88 mcg, 88 mcg, Oral, Q AM, Maricel Peraza APRN, 88 mcg at 05/06/24 0537    Magnesium Low Dose Replacement - Follow Nurse / BPA Driven Protocol, , Does not apply, PRN, Maricel Peraza APRN    midodrine (PROAMATINE) tablet 10 mg, 10 mg, Oral, TID AC, Golden Madison MD, 10 mg at 05/06/24 0537    nitroglycerin (NITROSTAT) SL tablet 0.4 mg, 0.4 mg, Sublingual, Q5 Min PRN, Maricel Peraza APRN    norepinephrine (LEVOPHED) 8 mg in 250 mL NS infusion (premix), 0.02-0.3 mcg/kg/min, Intravenous, Titrated, Eyad Ledesma MD, Stopped at 05/05/24 1242    pantoprazole (PROTONIX) EC tablet 40 mg, 40 mg, Oral, Q AM, Antonio Angeles MD, 40 mg at 05/06/24 0537    polyethylene glycol (MIRALAX) packet 17 g, 17 g, Oral, Daily, WestArtur MD, 17 g at 05/02/24 1037    prochlorperazine (COMPAZINE) injection 5 mg, 5 mg, Intravenous, Q6H PRN, Maricel Peraza APRN, 2.5 mg at 04/26/24 1316    sennosides-docusate (PERICOLACE) 8.6-50 MG per tablet 2 tablet, 2 tablet, Oral, BID, 2 tablet at 05/02/24 2107 **AND** [DISCONTINUED] polyethylene glycol (MIRALAX) packet 17 g, 17 g, Oral, Daily PRN, 17 g at 04/30/24 2038 **AND** [DISCONTINUED] bisacodyl (DULCOLAX) EC tablet 5 mg, 5 mg, Oral, Daily PRN, 5 mg at 05/01/24 0956 **AND** [DISCONTINUED] bisacodyl (DULCOLAX) suppository 10 mg, 10 mg, Rectal, Daily PRN, Antonio Angeles MD    sodium chloride 0.9 % flush 10 mL, 10 mL, Intravenous, PRN, Case, Hailee V., DO    sodium chloride 0.9 % flush 10 mL, 10 mL, Intravenous, Q12H, Case, Hailee V., DO, 10 mL at 05/06/24 0806    tamsulosin (FLOMAX) 24 hr capsule 0.4 mg, 0.4 mg, Oral, Daily, Maricel Peraza, APRN, 0.4 mg at 05/06/24 0806     Labs:   Results from last 7 days   Lab Units 05/06/24  0400   WBC 10*3/mm3 7.91   HEMOGLOBIN  g/dL 8.4*   HEMATOCRIT % 26.1*   PLATELETS 10*3/mm3 207     Results from last 7 days   Lab Units 05/06/24  0400 05/05/24  0523   SODIUM mmol/L 137 142   POTASSIUM mmol/L 4.3 4.1   CHLORIDE mmol/L 104 109*   CO2 mmol/L 20.0* 18.0*   BUN mg/dL 77* 71*   CREATININE mg/dL 2.83* 2.81*   CALCIUM mg/dL 8.1* 8.2*   BILIRUBIN mg/dL  --  0.5   ALK PHOS U/L  --  75   ALT (SGPT) U/L  --  65*   AST (SGOT) U/L  --  11   GLUCOSE mg/dL 252* 159*     Imaging Results (Last 72 Hours)       Procedure Component Value Units Date/Time    XR Chest 1 View [[id number]] Collected: 05/06/24 0715     Updated: 05/06/24 0720    Narrative:      XR CHEST 1 VW    Date of Exam: 5/6/2024 3:02 AM EDT    Indication: Heart Failure    Comparison: 4/28/2024    Findings:  Patient is status post median sternotomy and CABG. Left chest wall pacemaker is present. There is a right arm PICC which terminates overlying the SVC. There is a small left pleural effusion with left basilar atelectasis. Lungs appear otherwise adequately   aerated. Cardiac silhouette is magnified. No obvious pneumothorax. Osseous structures are unchanged.          Impression:      Impression:  1.Possible small left pleural effusion. Mild left basilar atelectasis.      Electronically Signed: Lucio Lanier MD    5/6/2024 7:17 AM EDT    Workstation ID: [id number]                  Diagnostics: Reviewed    A:     Cardiac arrest    Type 2 diabetes mellitus with hyperglycemia    Hyperlipidemia LDL goal <70    Hypothyroidism (acquired)    Coronary artery disease involving native coronary artery of native heart with angina pectoris    Pacemaker    HFrEF (heart failure with reduced ejection fraction)    Anemia, chronic disease    Stage 3b chronic kidney disease    UTI (urinary tract infection)    Acute respiratory failure with hypoxia    Elevated troponin level not due myocardial infarction       Impression:  S/P vfib arrest  ESHD  DM 2  PPM in situ  HFrEF  A/CKD - Cr 3.38  UTI  Acute hypoxic resp  failure - improved    Symptoms:   Dyspnea  Rib fracture pain  Debility    P:   Met with spouse and pt later in day.  Answered basic questions re: difference in hospice and palliative care.  Reassured they can certainly try to do rehab and consider hospice if unable.  Tolerance for extensive rehab unknown until he tries.  Spouse and pt planning to talk further with hospice and case mgmt. Palliative Care Team will continue to follow patient.     Lexi Flores MD, 5/6/2024, 11:19 EDT

## 2024-05-06 NOTE — PROGRESS NOTES
Continued Stay Note  [facility]     Patient Name: Jermaine Singh  MRN: [id number]  Today's Date: 5/6/2024    Admit Date: 4/22/2024    Plan: To be determined   Discharge Plan       Row Name 05/06/24 6660       Plan    Plan To be determined    Plan Comments Visit made to pt, pt alert, talkative-participated in the conversation, pt's wife present. Pt and wife aware of the hospice referral. Teaching done on hospice philosophy, goals of care and hospice services-team visits, equipment, supplies, medications. Pt and wife discussed hospice with each other, decision was made for pt to discuss hospice with daughters before making a final decision. Plan made for the hospice liaison to make a follow up visit tomorrow. Please call [phone] if can be of further assistance.                   Discharge Codes    No documentation.                       Spring Crane RN

## 2024-05-06 NOTE — ACP (ADVANCE CARE PLANNING)
completed a Living Will with patient. Per his request, he named his wife Nerissa Singh [phone] as his designate. His alternate designate is his daughter, Dulce Hanley.  Patient was clear that he does not want life sustaining measures in the event that he is not expected to recover his mental faculties.   A copy was scanned into his electronic medical record; the original and copies were given to his wife.

## 2024-05-06 NOTE — PLAN OF CARE
Goal Outcome Evaluation:      No changes this shift. VSS. Pressors remain off.  ml's out. No BM this shift. Hospice met with patient at bedside. Family updated at bedside.

## 2024-05-06 NOTE — CASE MANAGEMENT/SOCIAL WORK
Continued Stay Note  [facility]     Patient Name: Jermaine Singh  MRN: [id number]  Today's Date: 5/6/2024    Admit Date: 4/22/2024    Plan: Ongoing   Discharge Plan       Row Name 05/06/24 1114       Plan    Plan Ongoing    Patient/Family in Agreement with Plan other (see comments)    Plan Comments Pt was discussed in Medical Rounds today. Hospice and Palliative team are scheduled to have a Goals of Care meeting today with pt and family at 1400.  will continue to follow.    Final Discharge Disposition Code 30 - still a patient                   Discharge Codes    No documentation.                       Stacy Akers RN

## 2024-05-06 NOTE — PLAN OF CARE
Problem: Adult Inpatient Plan of Care  Goal: Plan of Care Review  Outcome: Ongoing, Progressing  Flowsheets (Taken 5/6/2024 0607)     Goal Outcome Evaluation:    No acute events overnight. No c/o nausea, SOA or anxiety. V Paced on monitor, tolerating RA, TMax 37.1. MAP > 65, norepi on hold for shift. UOP 240mL, 2 incontinent BMs. A+O x 4. Tylenol PO given x 1 for mild pain.

## 2024-05-07 LAB
ANION GAP SERPL CALCULATED.3IONS-SCNC: 12 MMOL/L (ref 5–15)
BUN SERPL-MCNC: 79 MG/DL (ref 8–23)
BUN/CREAT SERPL: 27.8 (ref 7–25)
CALCIUM SPEC-SCNC: 8.2 MG/DL (ref 8.6–10.5)
CHLORIDE SERPL-SCNC: 104 MMOL/L (ref 98–107)
CO2 SERPL-SCNC: 20 MMOL/L (ref 22–29)
CREAT SERPL-MCNC: 2.84 MG/DL (ref 0.76–1.27)
DEPRECATED RDW RBC AUTO: 49.7 FL (ref 37–54)
EGFRCR SERPLBLD CKD-EPI 2021: 21.9 ML/MIN/1.73
ERYTHROCYTE [DISTWIDTH] IN BLOOD BY AUTOMATED COUNT: 15.3 % (ref 12.3–15.4)
GLUCOSE BLDC GLUCOMTR-MCNC: 143 MG/DL (ref 70–130)
GLUCOSE BLDC GLUCOMTR-MCNC: 158 MG/DL (ref 70–130)
GLUCOSE BLDC GLUCOMTR-MCNC: 180 MG/DL (ref 70–130)
GLUCOSE BLDC GLUCOMTR-MCNC: 218 MG/DL (ref 70–130)
GLUCOSE SERPL-MCNC: 187 MG/DL (ref 65–99)
HCT VFR BLD AUTO: 27.4 % (ref 37.5–51)
HGB BLD-MCNC: 8.8 G/DL (ref 13–17.7)
MAGNESIUM SERPL-MCNC: 2.6 MG/DL (ref 1.6–2.4)
MCH RBC QN AUTO: 29 PG (ref 26.6–33)
MCHC RBC AUTO-ENTMCNC: 32.1 G/DL (ref 31.5–35.7)
MCV RBC AUTO: 90.4 FL (ref 79–97)
PHOSPHATE SERPL-MCNC: 3.9 MG/DL (ref 2.5–4.5)
PLATELET # BLD AUTO: 223 10*3/MM3 (ref 140–450)
PMV BLD AUTO: 10.8 FL (ref 6–12)
POTASSIUM SERPL-SCNC: 4.4 MMOL/L (ref 3.5–5.2)
RBC # BLD AUTO: 3.03 10*6/MM3 (ref 4.14–5.8)
SODIUM SERPL-SCNC: 136 MMOL/L (ref 136–145)
WBC NRBC COR # BLD AUTO: 10.63 10*3/MM3 (ref 3.4–10.8)

## 2024-05-07 PROCEDURE — 82948 REAGENT STRIP/BLOOD GLUCOSE: CPT

## 2024-05-07 PROCEDURE — 97535 SELF CARE MNGMENT TRAINING: CPT

## 2024-05-07 PROCEDURE — 97530 THERAPEUTIC ACTIVITIES: CPT

## 2024-05-07 PROCEDURE — 84100 ASSAY OF PHOSPHORUS: CPT | Performed by: INTERNAL MEDICINE

## 2024-05-07 PROCEDURE — 99232 SBSQ HOSP IP/OBS MODERATE 35: CPT | Performed by: INTERNAL MEDICINE

## 2024-05-07 PROCEDURE — 80048 BASIC METABOLIC PNL TOTAL CA: CPT | Performed by: INTERNAL MEDICINE

## 2024-05-07 PROCEDURE — 63710000001 INSULIN GLARGINE PER 5 UNITS: Performed by: INTERNAL MEDICINE

## 2024-05-07 PROCEDURE — 83735 ASSAY OF MAGNESIUM: CPT | Performed by: INTERNAL MEDICINE

## 2024-05-07 PROCEDURE — 85027 COMPLETE CBC AUTOMATED: CPT | Performed by: INTERNAL MEDICINE

## 2024-05-07 PROCEDURE — 63710000001 INSULIN LISPRO (HUMAN) PER 5 UNITS: Performed by: INTERNAL MEDICINE

## 2024-05-07 PROCEDURE — 25010000002 FUROSEMIDE PER 20 MG: Performed by: INTERNAL MEDICINE

## 2024-05-07 PROCEDURE — 25010000002 HYDROCORTISONE SOD SUC (PF) 100 MG RECONSTITUTED SOLUTION: Performed by: INTERNAL MEDICINE

## 2024-05-07 RX ORDER — HYDROCORTISONE 10 MG/1
50 TABLET ORAL 2 TIMES DAILY WITH MEALS
Status: DISCONTINUED | OUTPATIENT
Start: 2024-05-07 | End: 2024-05-08 | Stop reason: HOSPADM

## 2024-05-07 RX ADMIN — INSULIN GLARGINE 18 UNITS: 100 INJECTION, SOLUTION SUBCUTANEOUS at 20:46

## 2024-05-07 RX ADMIN — PANTOPRAZOLE SODIUM 40 MG: 40 TABLET, DELAYED RELEASE ORAL at 05:21

## 2024-05-07 RX ADMIN — ACETAMINOPHEN 650 MG: 325 TABLET ORAL at 20:45

## 2024-05-07 RX ADMIN — ACETAMINOPHEN 650 MG: 325 TABLET ORAL at 12:16

## 2024-05-07 RX ADMIN — TAMSULOSIN HYDROCHLORIDE 0.4 MG: 0.4 CAPSULE ORAL at 08:25

## 2024-05-07 RX ADMIN — INSULIN LISPRO 3 UNITS: 100 INJECTION, SOLUTION INTRAVENOUS; SUBCUTANEOUS at 11:56

## 2024-05-07 RX ADMIN — HYDROCORTISONE SODIUM SUCCINATE 50 MG: 100 INJECTION, POWDER, FOR SOLUTION INTRAMUSCULAR; INTRAVENOUS at 00:10

## 2024-05-07 RX ADMIN — ATORVASTATIN CALCIUM 20 MG: 20 TABLET, FILM COATED ORAL at 20:46

## 2024-05-07 RX ADMIN — APIXABAN 2.5 MG: 2.5 TABLET, FILM COATED ORAL at 20:46

## 2024-05-07 RX ADMIN — MIDODRINE HYDROCHLORIDE 10 MG: 10 TABLET ORAL at 14:06

## 2024-05-07 RX ADMIN — Medication 1 APPLICATION: at 08:26

## 2024-05-07 RX ADMIN — ASPIRIN 81 MG CHEWABLE TABLET 81 MG: 81 TABLET CHEWABLE at 08:25

## 2024-05-07 RX ADMIN — LEVOTHYROXINE SODIUM 88 MCG: 88 TABLET ORAL at 05:21

## 2024-05-07 RX ADMIN — MIDODRINE HYDROCHLORIDE 10 MG: 10 TABLET ORAL at 05:21

## 2024-05-07 RX ADMIN — Medication 10 ML: at 20:46

## 2024-05-07 RX ADMIN — FUROSEMIDE 80 MG: 10 INJECTION, SOLUTION INTRAMUSCULAR; INTRAVENOUS at 08:25

## 2024-05-07 RX ADMIN — HYDROCORTISONE SODIUM SUCCINATE 50 MG: 100 INJECTION, POWDER, FOR SOLUTION INTRAMUSCULAR; INTRAVENOUS at 08:25

## 2024-05-07 RX ADMIN — MIDODRINE HYDROCHLORIDE 10 MG: 10 TABLET ORAL at 20:46

## 2024-05-07 RX ADMIN — SENNOSIDES AND DOCUSATE SODIUM 2 TABLET: 8.6; 5 TABLET ORAL at 08:25

## 2024-05-07 RX ADMIN — Medication 10 ML: at 08:26

## 2024-05-07 RX ADMIN — APIXABAN 2.5 MG: 2.5 TABLET, FILM COATED ORAL at 08:26

## 2024-05-07 RX ADMIN — SENNOSIDES AND DOCUSATE SODIUM 2 TABLET: 8.6; 5 TABLET ORAL at 20:45

## 2024-05-07 RX ADMIN — INSULIN LISPRO 5 UNITS: 100 INJECTION, SOLUTION INTRAVENOUS; SUBCUTANEOUS at 08:25

## 2024-05-07 NOTE — PROGRESS NOTES
Spoke with wife, Nerissa, to order DME equipemnt; [place] Wednesday 5/8/24 after 12pm Bed, Oxygen Concentrator 2-4L NC PRN for SOA r/t Heart Failure EF 20-25%, OBT, Bebulizer Machine, Sree LIft, Bediside Toilet, supplies chucks, tabbed briefs, gloves, urinal. No smoking in home, no currently DME equiment in home. ME has been ordered. Wife states she would like to set up several Hospice PT visits to help with education and demonstration of transferring. Ambulance date/time requesteed, 3 steps up into the front door but will need to come through grass of front yard (ambulance service used front yard when picked up to bring to ED).       Xiomara Angel, [facility] Hospice Liaison

## 2024-05-07 NOTE — PLAN OF CARE
Goal Outcome Evaluation:  Plan of Care Reviewed With: patient        Progress: no change  Outcome Evaluation: Pt continues to present to OT w/ deficits in functional endurance and weakness. Pt is dizzy w/ all position changes. Will contiues to benefit from IPOT to prevent debility and address deficits. d/c rec is SNF. Please refer to PT note on equipment rec.      Anticipated Discharge Disposition (OT): skilled nursing facility

## 2024-05-07 NOTE — SIGNIFICANT NOTE
05/07/24 1526   SLP Deferred Reason   SLP Deferred Reason Routine  (Spoke to RN/pt, SLP will plan for FEES 5/8)

## 2024-05-07 NOTE — PLAN OF CARE
Goal Outcome Evaluation:      No changes. VSS. Patient unable to void; I+O cathed for 550 ml's. Possibly home with hospice tomorrow. Family updated at bedside.

## 2024-05-07 NOTE — PROGRESS NOTES
Jermaine Singh  1945  N205/1    Patient remains on RA, off pressor support.  Renal function stable, Cr 2.84.  H/H stable.    Vitals:    05/07/24 0800   BP: 124/100   Pulse: 72   Resp: 18   Temp: 98.1 °F (36.7 °C)   SpO2: 96%     Cardiology will continue to follow from periphery.  Plan for today is for family (patient/wife/daughter) to further discuss Hospice care/services.    Megan Shukla PA-C  Electronically signed by RANULFO Mejia, 05/07/24, 9:51 AM EDT.

## 2024-05-07 NOTE — PROGRESS NOTES
"   LOS: 14 days    Patient Care Team:  Marysol Shrestha MD as PCP - General (Internal Medicine)  Cooper Apodaca MD as Consulting Physician (Cardiology)    Subjective     Stable overnight    Objective     Vital Signs:  Blood pressure 124/100, pulse 72, temperature 98.1 °F (36.7 °C), temperature source Oral, resp. rate 18, height 190 cm (74.8\"), weight 87.1 kg (192 lb 0.3 oz), SpO2 96%.      Intake/Output Summary (Last 24 hours) at 5/7/2024 0948  Last data filed at 5/7/2024 0800  Gross per 24 hour   Intake 960 ml   Output 645 ml   Net 315 ml        05/06 0701 - 05/07 0700  In: 720 [P.O.:720]  Out: 685 [Urine:685]    Physical Exam:        GENERAL: WD WM NAD  NEURO: Awake and alert, oriented. No focal deficit  PSYCHIATRIC: NMA. Cooperative with PE  EYE: PE, no icterus, no conjunctivitis  ENT: ommm, dentition intact,  Hearing intact  NECK: Supple , No JVD discernable,  Trachea midline  CV: No edema, RRR  LUNGS:  Quiet,  Nonlabored resp.  Symmetrical expansion  ABDOMEN: Nondistended, soft nontender.  : No Fonseca, no palp bladder  SKIN: Warm and dry without rash      Labs:  Results from last 7 days   Lab Units 05/07/24  0538 05/06/24  0400 05/05/24  0523   WBC 10*3/mm3 10.63 7.91 6.61   HEMOGLOBIN g/dL 8.8* 8.4* 9.0*   PLATELETS 10*3/mm3 223 207 187     Results from last 7 days   Lab Units 05/07/24  0538 05/06/24  0400 05/05/24  0523 05/04/24  0528 05/03/24  0441 05/02/24  0433   SODIUM mmol/L 136 137 142 142 140 136   POTASSIUM mmol/L 4.4 4.3 4.1 4.0 4.2 4.4   CHLORIDE mmol/L 104 104 109* 110* 109* 107   CO2 mmol/L 20.0* 20.0* 18.0* 20.0* 18.0* 17.0*   BUN mg/dL 79* 77* 71* 72* 79* 82*   CREATININE mg/dL 2.84* 2.83* 2.81* 2.95* 3.38* 3.30*   CALCIUM mg/dL 8.2* 8.1* 8.2* 8.0* 7.9* 7.8*   PHOSPHORUS mg/dL 3.9 4.6*  --  4.5  --  4.9*   MAGNESIUM mg/dL 2.6*  --   --  2.5*  --  2.8*   ALBUMIN g/dL  --  3.3* 3.3* 2.8* 2.8* 2.8*     Results from last 7 days   Lab Units 05/05/24  0504   ALK PHOS U/L 75   BILIRUBIN mg/dL " 0.5   ALT (SGPT) U/L 65*   AST (SGOT) U/L 11                   Estimated Creatinine Clearance: 26 mL/min (A) (by C-G formula based on SCr of 2.84 mg/dL (H)).         A/P:    ARF: Creatinine remains stable at 2.8.  Urine output borderline nonoliguric.  Creatinine increased post acute MI with likely underlying hemodynamic injury with ATN along with prerenal azotemia due to poor cortical perfusion with cardiogenic shock..  For now continue supportive care.  Strict I's and O's.  Monitor renal function closely for further improvement.    CKD 3: Previous baseline creatinine 1.7-2.0.  Follows with Dr. Pal.    Cardiogenic shock: Blood pressure stable today.  Patient on midodrine, hydrocortisone..  Weaned off pressors .  Continue to monitor for now.    Metabolic acidosis: Bicarb borderline.  Monitor for now.    Anemia: Hemoglobin below goal.  Transfuse as indicated for Hgb <7.    Volume: Stable overnight..  Monitor for euvolemia.  Patient with low oncotic pressure.  May have component of third spaced edema.  Give albumin as needed for fluid mobilization with diuresis.    CAD: Patient post V-fib arrest.  EF <20%.  Severe multivessel disease not amendable to surgery or percutaneous intervention.  Cardiology following.    High risk and complexity patient.  May choose to go home with hospice.  Would support that decision.      Farooq Ponce MD  05/07/24  09:48 EDT

## 2024-05-07 NOTE — PLAN OF CARE
Goal Outcome Evaluation:  Plan of Care Reviewed With: patient        Progress: no change  Outcome Evaluation: Pt continues to be limited by generalized weakness, decreased activity tolerance, and fatigues quickly with activity. Pt required modA x2 for mobility with cues for sequencing. PT rec SNF at d/c, but if pt chooses to go home with hospice, pt will need a FWW and Sree lift for safe transfers.      Anticipated Discharge Disposition (PT): skilled nursing facility, other (see comments) (vs. home with hospice)

## 2024-05-07 NOTE — PROGRESS NOTES
Pulmonary/Critical Care Follow-up     LOS: 14 days   Patient Care Team:  Marysol Shrestha MD as PCP - General (Internal Medicine)  Cooper Apodaca MD as Consulting Physician (Cardiology)    Chief Complaint/reason: Cardiac arrest      Subjective     Interval History:   Patient is doing well.  No specific complaints.  Ate breakfast from [employer] and currently drinking coffee.  No fevers or chills.  Denies dyspnea.  Remains off of pressors with adequate blood pressure.      History taken from: Patient, staff    PMH/FH/Social History were reviewed and updated appropriately in the electronic medical record.     Review of Systems:    Review of 14 systems was completed with positives and pertinent negatives noted in the subjective section.  All other systems reviewed and are negative.         Objective     Vital Signs  Temp:  [97.7 °F (36.5 °C)-99.7 °F (37.6 °C)] 98.1 °F (36.7 °C)  Heart Rate:  [64-80] 67  Resp:  [16-20] 18  BP: ()/() 104/82  05/06 0701 - 05/07 0700  In: 720 [P.O.:720]  Out: 685 [Urine:685]  Body mass index is 24.13 kg/m².     IV drips:        Physical Exam:     Constitutional:   Alert, in no acute distress   Head:   Normocephalic, atraumatic   Eyes:           Lids and lashes normal, conjunctivae and sclerae normal.  PER   ENMT:  Ears appear intact with no abnormalities noted     Lips normal.     Neck:  Trachea midline, no JVD   Lungs/Resp:    Normal effort, symmetric chest rise, no crepitus, minimal rales and slightly diminished breath sounds at bases.          Heart/CV:   Regular rhythm and normal rate, no murmur   Abdomen/GI:    Soft, nontender, nondistended   :    Deferred   Extremities/MSK:  No clubbing or cyanosis.  Trace bilateral lower extremity   Pulses:  Pulses palpable and equal bilaterally   Skin:  No bleeding, bruising or rash   Heme/Lymph:  No cervical or supraclavicular adenopathy.   Neurologic:    Psychiatric:    Moves all extremities with no obvious focal motor deficit.   Cranial nerves 2 - 12 grossly intact  Non-agitated, normal affect.    The above physical exam findings were reviewed and reflect my exam findings as of today's exam.   Electronically signed by:  Fran Flores MD  05/07/24  11:58 EDT      Results Review:     I reviewed the patient's new clinical results.   Results from last 7 days   Lab Units 05/07/24 0538 05/06/24 0400 05/05/24  0523 05/04/24  0528 05/03/24  0441   SODIUM mmol/L 136 137 142 142 140   POTASSIUM mmol/L 4.4 4.3 4.1 4.0 4.2   CHLORIDE mmol/L 104 104 109* 110* 109*   CO2 mmol/L 20.0* 20.0* 18.0* 20.0* 18.0*   BUN mg/dL 79* 77* 71* 72* 79*   CREATININE mg/dL 2.84* 2.83* 2.81* 2.95* 3.38*   CALCIUM mg/dL 8.2* 8.1* 8.2* 8.0* 7.9*   BILIRUBIN mg/dL  --   --  0.5 0.4 0.4   ALK PHOS U/L  --   --  75 77 74   ALT (SGPT) U/L  --   --  65* 84* 106*   AST (SGOT) U/L  --   --  11 12 13   GLUCOSE mg/dL 187* 252* 159* 104* 110*     Results from last 7 days   Lab Units 05/07/24  0538 05/06/24  0400 05/05/24  0523   WBC 10*3/mm3 10.63 7.91 6.61   HEMOGLOBIN g/dL 8.8* 8.4* 9.0*   HEMATOCRIT % 27.4* 26.1* 27.6*   PLATELETS 10*3/mm3 223 207 187         Results from last 7 days   Lab Units 05/07/24  0538 05/06/24  0400 05/04/24  0528 05/02/24  0433   MAGNESIUM mg/dL 2.6*  --  2.5* 2.8*   PHOSPHORUS mg/dL 3.9 4.6* 4.5 4.9*     proBNP 5/6/2024: Greater than 70,000    I reviewed the patient's new imaging including images and reports.    Chest x-ray 5/6/2024 shows probable small bilateral pleural effusions and borderline cardiomegaly with otherwise no acute radiographic chest abnormality.    Medication Review:   apixaban, 2.5 mg, Oral, Q12H  aspirin, 81 mg, Oral, Daily  atorvastatin, 20 mg, Oral, Nightly  castor oil-balsam peru, 1 Application, Topical, Daily  furosemide, 80 mg, Intravenous, Daily  hydrocortisone sodium succinate, 50 mg, Intravenous, Q8H  insulin glargine, 1-200 Units, Subcutaneous, Nightly - Glucommander  insulin lispro, 1-200 Units, Subcutaneous, 4x  Daily With Meals & Nightly  levothyroxine, 88 mcg, Oral, Q AM  midodrine, 10 mg, Oral, TID AC  pantoprazole, 40 mg, Oral, Q AM  polyethylene glycol, 17 g, Oral, Daily  senna-docusate sodium, 2 tablet, Oral, BID  sodium chloride, 10 mL, Intravenous, Q12H  tamsulosin, 0.4 mg, Oral, Daily             Assessment & Plan         Cardiac arrest    Type 2 diabetes mellitus with hyperglycemia    Hyperlipidemia LDL goal <70    Hypothyroidism (acquired)    Coronary artery disease involving native coronary artery of native heart with angina pectoris    Pacemaker    HFrEF (heart failure with reduced ejection fraction)    Anemia, chronic disease    Stage 3b chronic kidney disease    UTI (urinary tract infection)    Acute respiratory failure with hypoxia    Elevated troponin level not due myocardial infarction    79 y.o. with history of T2DM, PAF, SSS status post PPM placement, CHF, PVD, left diabetic foot infection, bilateral DVTs, on Eliquis, who presented to [facility] on 4/22/2024 with weakness and chronic right lower extremity wound.  UA was consistent with UTI.  He was started on Rocephin and admitted to the hospitalist service.    On 4/25/2024, he suffered a V-fib arrest and was intubated during CPR.  He was transferred to the intensive care unit.  He was able to be extubated on 4/26/2024.  After extubation, he had a tenuous respiratory status and family made him a DNR/DNI.  Patient was seen by infectious disease who managed antibiotics.  Patient has had ischemic hepatitis which has been improving.  He has required low-dose norepinephrine and midodrine.  LVEF 21 to 25%.    Plan:  1.  For hypotension: Multifactorial, cardiac with possible adrenal insufficiency.  Initiated on stress dose steroids with hydrocortisone and fludrocortisone on 5/4/2024.  Off of fludrocortisone and decreased hydrocortisone to 50 mg IV every 8 hours on 5/6/2024.  I will go ahead and decrease this to 50 mg p.o. every 12 hours.  Continue midodrine.  Okay to  telemetry/hospitalist at this point.  2.  For T2DM: Insulin per Glucomander.  3.  For hypothyroidism: Levothyroxine.  4.  For PAF/history of DVT: Eliquis.  5.  For BPH: Tamsulosin.  6.  Okay to telemetry/hospitalist.  7.  Goals of care: Hospice referral made with patient/family meeting with hospice planned for today.  8.  For congestive heart failure: LVEF 21 to 25%.  Agree with diuresis as ordered by nephrology      MDM:    Problem(s) High due to: Acute or Chronic illness or injury that may poses a threat to life or bodily function  Data: High due to: Review of the result(s) of each unique test and Independent interpretation of a test performed by another physician/NPP (not separately reported)  Risk: Low risk of morbidity from additional diagnostic testing or treatment    High    Electronically signed by:    Fran Flores MD  05/07/24  11:58 EDT      *. Please note that portions of this note were completed with [email or web address] - a voice recognition program.

## 2024-05-07 NOTE — PLAN OF CARE
Goal Outcome Evaluation:  Plan of Care Reviewed With: patient        Progress: no change  Outcome Evaluation: Pt. was just beginning to work with therapy this am.  Palliative care following.  Plan for pt to discharge home tomorrow with hospice services.    Pt. Filled out LW naming his wife Nerissa Singh as HCS and his daughter Dulce Hanley as secondary HCS.    1300 Palliative IDT meeting: MARK Sue RN, CHPN; SAHRA Simmons, DAVID; RIVERA Olmos, GUZMAN, [facility];RIVERA Flores MD; JASWINDER Antunez RN, MALLORIE; NICK Joseph MDiv, [facility]    After hours, weekends and holidays, contact Palliative Provider by calling [phone].

## 2024-05-07 NOTE — DISCHARGE PLACEMENT REQUEST
"Jermaine Singh \"Adi\" (79 y.o. Male)       Date of Birth   1945    Social Security Number       Address   [address] [facility] [address]    Home Phone   [phone]    MRN   [id number]       Ad[employer]ist   None    Marital Status                               Admission Date   4/22/24    Admission Type   Emergency    Admitting Provider   Jose Ramon Murphy MD    Attending Provider   Fran Flores MD    Department, Room/Bed   [facility] 2A ICU, N205/1       Discharge Date       Discharge Disposition       Discharge Destination                                 Attending Provider: Fran Flores MD    Allergies: Vancomycin    Isolation: None   Infection: None   Code Status: No CPR    Ht: 190 cm (74.8\")   Wt: 87.1 kg (192 lb 0.3 oz)    Admission Cmt: None   Principal Problem: Cardiac arrest [I46.9]                   Active Insurance as of 4/22/2024       Primary Coverage       Payor Plan Insurance Group Employer/Plan Group    MEDICARE MEDICARE A & B        Payor Plan Address Payor Plan Phone Number Payor Plan Fax Number Effective Dates    [address] [phone]  3/1/2010 - None Entered    [facility] [id number]         Subscriber Name Subscriber Birth Date Member ID       JERMAINE SINGH 1945 [id number]               Secondary Coverage       Payor Plan Insurance Group Employer/Plan Group    HUMANA HUMANA  SUP              [id number]       Payor Plan Address Payor Plan Phone Number Payor Plan Fax Number Effective Dates    [address] [address]   8/1/2020 - None Entered    [facility] [id number]         Subscriber Name Subscriber Birth Date Member ID       JERMAINE SINGH 1945 [id number]                     Emergency Contacts        (Rel.) Home Phone Work Phone Mobile Phone    HIPOLITO SINGH (Spouse) [phone] -- [phone]    THOR OJEDA (Daughter) [phone] -- [phone]              Emergency Contact Information       Name Relation Home Work Mobile    " "HIPOLITO DAWN Spouse [phone]    THOR OJEDA Daughter [phone]          Insurance Information                  MEDICARE/MEDICARE A & B Phone: [phone]    Subscriber: Jermaine Dawn Subscriber#: [id number]    Group#: -- Precert#: --        HUMANA/HUMANA MC SUP              Phone: --    Subscriber: Jermaine Dawn Subscriber#: [id number]    Group#: [id number] Precert#: --             History & Physical        Artur Avila MD at [facility]              [facility]  HISTORY AND PHYSICAL    Patient Name: Jermaine Dawn  : 1945  MRN: [id number]  Primary Care Physician: Marysol Shrestha MD  Date of admission: 2024    Subjective  Subjective     Chief Complaint:  Generalized weakness    HPI:  Jermaine Dawn is a 79 y.o. male with a history of CAD, DM, HTN, HLD, PAF, PVD, RBBB, Asthma, weakness, Hypothyroidism, CKD, presents to the ED with complaints of generalized weakness.  Patient has a chronic wound on his RLE and is followed by home health.  The wound on his RLE is being treated with silver and covered with a dressing.  Wife reports that his wound had been improving but today he has had increased serous drainage.  Wife also reports that today she noticed a \"blister\" on the bottom of his right foot.  Patient has had difficulty urinating today and required in and out cath in the ED for retention.  Patient complains of nausea and vomiting this afternoon, dizziness for the past 2 days, fatigue, dysuria, and lower abdominal pain that he attributes to his hernia.  At baseline patient has a lift chair and uses a walker at home.  Today he was unable to stand even with the lift chair due to weakness and called EMS for assistance.  No fevers, shortness of air, chest pain, diarrhea, melena, difficulty with speech, focal weakness, or any other complaints at this time.  UA consistent with UTI.  Chest x-ray shows no active pulmonary " process.  X-ray right foot shows large amount of soft tissue swelling with no definite subcutaneous emphysema or destructive bony lesion.  Patient was started on Rocephin in the ED, and is being admitted to the hospitalist for further evaluation and management.        Review of Systems   Constitutional:  Positive for fatigue. Negative for appetite change, chills and fever.   Eyes: Negative.    Respiratory: Negative.     Cardiovascular:  Positive for leg swelling. Negative for chest pain and palpitations.   Gastrointestinal:  Positive for abdominal pain, constipation, diarrhea, nausea and vomiting. Negative for abdominal distention.   Endocrine: Negative.    Genitourinary:  Positive for difficulty urinating and dysuria. Negative for frequency and urgency.   Musculoskeletal: Negative.    Skin:  Positive for wound.   Allergic/Immunologic: Negative.    Neurological:  Positive for dizziness, weakness, light-headedness and headaches. Negative for syncope, facial asymmetry and speech difficulty.   Hematological: Negative.    Psychiatric/Behavioral: Negative.                  Personal History     Past Medical History:   Diagnosis Date    Allergic     Arthritis     Asthma     Coronary artery disease     Diabetes mellitus 2000    started on inuslin 12/2018; started on po meds in 2000; checking blood sugars daily     Disease of thyroid gland     po meds daily for hypothyroidism     Elevated serum creatinine 11/15/2022    Elevated troponin 10/02/2022    Generalized weakness 11/15/2022    History of fracture as a child     rt leg- severe     Hyperlipidemia     Hypertension     Hypothyroidism     PAF (paroxysmal atrial fibrillation) 07/23/2023    Peripheral neuropathy     Peripheral vascular disease     s/p angiogram 2/19-needs stent in left leg     Pleural effusion, bilateral 11/15/2022    Proximal phalanx fracture of the second digit extending into the second metatarsal joint 07/28/2022    RBBB     Right knee pain      Unstable angina 02/12/2019    Added automatically from request for surgery [id number]    Vitamin D deficiency              Past Surgical History:   Procedure Laterality Date    AMPUTATION DIGIT Left 01/17/2023    Procedure: AMPUTATION DIGIT LEFT;  Surgeon: Gopal Márquez MD;  Location:  [facility];  Service: Vascular;  Laterality: Left;    APPENDECTOMY      CARDIAC CATHETERIZATION N/A 02/14/2019    Procedure: Left Heart Cath;  Surgeon: Cooper Apodaca MD;  Location: [facility] CATH INVASIVE LOCATION;  Service: Cardiology    CARDIAC ELECTROPHYSIOLOGY PROCEDURE N/A 05/18/2022    Procedure: DEVICE IMPLANT;  Surgeon: Cooper Apodaca MD;  Location: [facility] CATH INVASIVE LOCATION;  Service: Cardiology;  Laterality: N/A;    CARDIAC SURGERY      COLONOSCOPY      CORONARY ARTERY BYPASS GRAFT N/A 03/22/2019    Procedure: MEDIAN STERNOTOMY, CORONARY ARTERY BYPASS GRAFT X3, UTILIZING THE LEFT INTERNAL MAMMARY ARTERY, EVH AND OPEN HARVEST OF THE RIGHT GREATER SAPHENOUS VEIN, EXPLORATION OF THE LEFT LEG;  Surgeon: Ap Au MD;  Location: [facility] OR;  Service: Cardiothoracic    EYE SURGERY Bilateral     cataracts     FRACTURE SURGERY      INTERVENTIONAL RADIOLOGY PROCEDURE N/A 07/29/2021    Procedure: Abdominal Aortagram with Runoff;  Surgeon: Jermaine Hernandez MD;  Location: [facility] CATH INVASIVE LOCATION;  Service: Cardiovascular;  Laterality: N/A;    KNEE ARTHROSCOPY      right x 2, left x 1    LACERATION REPAIR      right leg    LEG SURGERY      2 for fracture of rt leg     TONSILLECTOMY      Adnoidectomy    TRANS METATARSAL AMPUTATION Left 08/02/2022    Procedure: GREAT TOE AMPUTATION LEFT;  Surgeon: Gopal Márquez MD;  Location: [facility] OR;  Service: Vascular;  Laterality: Left;       Family History:  family history includes Cancer in his father; Coronary artery disease in his mother; Diabetes in his mother; Hyperlipidemia in his mother.     Social History:  reports that he has never smoked. He has been exposed  to tobacco smoke. He has never used smokeless tobacco. He reports that he does not currently use alcohol. He reports that he does not use drugs.  Social History     Social History Narrative    Lives in [place].       Medications:  Align, Cholecalciferol, Diclofenac Sodium, Hernia Support Right Medium, Hydrocortisone (Perianal), Insulin Lispro, Lifitegrast, Rollator Ultra-Light, Vibegron, acetaminophen, apixaban, aspirin, atorvastatin, azelastine, bumetanide, carvedilol, famotidine, fluconazole, fluocinonide, glucose blood, insulin detemir, isosorbide mononitrate, levothyroxine, linaclotide, losartan, metoprolol succinate XL, mupirocin, nitroglycerin, and nystatin    Allergies   Allergen Reactions    Vancomycin Other (See Comments)     Kidney failure per last admission       Objective  Objective     Vital Signs:   Temp:  [97.5 °F (36.4 °C)] 97.5 °F (36.4 °C)  Heart Rate:  [84-86] 86  Resp:  [16] 16  BP: (107-130)/(70-74) 110/72    Physical Exam   Constitutional: Awake, alert, wife at bedside, appears chronically ill  Eyes: PERRLA, sclerae anicteric, no conjunctival injection  HENT: NCAT, mucous membranes moist  Neck: Supple, no thyromegaly, no lymphadenopathy, trachea midline  Respiratory: Clear to auscultation bilaterally, nonlabored respirations   Cardiovascular: RRR, no murmurs, rubs, or gallops, palpable pedal pulses bilaterally  Gastrointestinal: Positive bowel sounds, soft, mild RLQ tenderness, nondistended  Musculoskeletal: 2+ BLE edema, no clubbing or cyanosis to extremities  Psychiatric: Appropriate affect, cooperative  Neurologic: Oriented x 3, strength symmetric in all extremities, Cranial Nerves grossly intact to confrontation, speech clear  Skin: Erythematous wound distal anterior right shin area with serous drainage on dressing.  Blister distal aspect of fifth metatarsal.       Result Review:  I have personally reviewed the results from the time of this admission to 4/22/2024 23:29 EDT and agree with  these findings:  [x]  Laboratory list / accordion  []  Microbiology  [x]  Radiology  []  EKG/Telemetry   []  Cardiology/Vascular   []  Pathology  [x]  Old records  []  Other:  Most notable findings include: Hgn 10.1, Hct 33.5, K 5.3, BUN 77, creatinine 1.7    LAB RESULTS:      Lab 04/22/24  1506   WBC 10.31   HEMOGLOBIN 10.1*   HEMATOCRIT 33.5*   PLATELETS 108*   NEUTROS ABS 8.74*   IMMATURE GRANS (ABS) 0.05   LYMPHS ABS 0.38*   MONOS ABS 0.95*   EOS ABS 0.16   MCV 99.4*         Lab 04/22/24  1713 04/22/24  1506   SODIUM  --  137   POTASSIUM  --  5.3*   CHLORIDE  --  108*   CO2  --  17.0*   ANION GAP  --  12.0   BUN  --  77*   CREATININE  --  1.70*   EGFR  --  40.5*   GLUCOSE  --  264*   CALCIUM  --  8.8   MAGNESIUM  --  2.2   TSH 1.940  --          Lab 04/22/24  1506   TOTAL PROTEIN 6.4   ALBUMIN 3.7   GLOBULIN 2.7   ALT (SGPT) 26   AST (SGOT) 18   BILIRUBIN 0.4   ALK PHOS 80         Lab 04/22/24  1713 04/22/24  1506   HSTROP T 119* 123*             Lab 04/22/24  1506   IRON 22*  22*   IRON SATURATION (TSAT) 7*   TIBC 325   TRANSFERRIN 218   FERRITIN 309.80         Brief Urine Lab Results  (Last result in the past 365 days)        Color   Clarity   Blood   Leuk Est   Nitrite   Protein   CREAT   Urine HCG        04/22/24 1554 Yellow   Clear   Moderate (2+)   Small (1+)   Negative   30 mg/dL (1+)                 Microbiology Results (last 10 days)       ** No results found for the last 240 hours. **            XR Foot 3+ View Right    Result Date: 4/22/2024  XR FOOT 3+ VW RIGHT Date of Exam: 4/22/2024 6:54 PM EDT Indication: Diabetic foot wound. Comparison: No comparison. Findings: No definite acute osseous abnormality is noted. There is diffuse soft tissue swelling overlying the foot. Mild irregularity noted along the distal aspect of the great toe. No subcutaneous emphysema noted. Joint spaces demonstrate diffuse mild degenerative change. Soft tissue swelling is more prominent along the dorsum of the foot.  Peripheral vascular calcifications are noted     Impression: Impression: Large amount of soft tissue swelling with no definite subcutaneous emphysema or destructive bone lesion. Recommend follow-up as clinically indicated Electronically Signed: Jamin Aguilar MD  4/22/2024 7:31 PM EDT  Workstation ID: [id number]    XR Chest 1 View    Result Date: 4/22/2024  XR CHEST 1 VW Date of Exam: 4/22/2024 2:49 PM EDT Indication: Weak/Dizzy/AMS triage protocol Comparison: Chest radiograph 3/9/2024 Findings: Stable cardiomegaly. Dual-chamber AICD. Median sternotomy and prior CABG. Platelike areas of atelectasis versus scar. No infectious appearing consolidation, edema, large effusion or pneumothorax. Lung volumes are borderline low. Degenerative related osseous changes.     Impression: Impression: No active pulmonary process. Stable radiographic appearance of the chest since 3/9/2024. Electronically Signed: Fran Reis MD  4/22/2024 3:15 PM EDT  Workstation ID: [id number]     Results for orders placed during the hospital encounter of 07/27/22    Adult Transthoracic Echo Complete W/ Cont if Necessary Per Protocol    Interpretation Summary  · Left ventricular ejection fraction appears to be 46 - 50%. Left ventricular systolic function is low normal.  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 Au    Chronic HFrEF (heart failure with reduced ejection fraction)    Anemia, chronic disease    Stage 3b chronic kidney disease    PAF (paroxysmal atrial fibrillation)    Jermaine Singh is a 79 y.o. male with a history of CAD, DM, HTN, HLD, PAF, PVD, RBBB, Asthma, weakness, Hypothyroidism, CKD, presents to the ED with complaints of generalized weakness.      Assessment and Plan:    Acute  UTI (POA)  -- UA: Blood moderate, leukocytes small, protein 30, RBC 11-20, WBC 21-50, bacteria 2+  -Rocephin empiric antibiotics; follow urine culture      Urinary retention  BPH  Bladder wall thickening--cystoscopy on 3/22/2024 showed scattered areas of flat erythema most notable on the right lateral wall.  3-4+ trabeculation.  -- Acute urinary retention protocol  -- Flomax  -- Follows with Dr. Soto with urology    Generalized weakness  -- Fall precautions  -- PT/OT consult  -- Consult case management    Chronic wound RLE  Blister right foot  --X-ray right foot shows large amount of soft tissue swelling with no definite subcutaneous emphysema or destructive bone lesion  -- Consult WOC in the a.m.    T2DM  -- A1c in the a.m.  -- lantus 5 units sq nightly, SSI (titrate prn)    Elevated troponin--chronically elevated (chronically elevate trop >100)  CAD s/p CABG  Parox afib/SSS/pacemaker  HTN  HLD  PVD  RBBB  Chronic HFrEF (EF 42% previous stress test)  -- Chest x-ray shows no active pulmonary process  -- Troponin 123 and 119, delta -4  -- Patient denies chest pain  -- Strict I's and O's  -- Daily weights  -- Continue oral Bumex 1 mg twice daily  -continue eliquis & coreg  -- Aspirin, atorvastatin, Coreg, Imdur, losartan      CKD 3 (baseline cr ~1.6-2.1)  Hyperkalemia  -- Creatinine 1.7, potassium 5.3  --low potassium diet  -lokelma x 1  --recheck potassium level in a.m. (if rising stop ARB and consider more aggressive iv insulin, etc)    Asthma  -currently stable, not in exacerbation  -- Chest x-ray shows no active pulmonary process  -- stable    Anemia  Thrombocytopenia  Iron def  -- Hemoglobin 10.1, hematocrit 33.5, platelets 108  -- No overt signs of bleeding; denies melena or brbpr  -iron level low; start iv iron day #1/3  -b12/folate pending  -monitor/trend hgb and plts    Hypothyroidism  --TSH 1.94; Continue levothyroxine      Am labs: cbc, bmp, A1c (f/u b12/folate, trend hgb, platelet levels, trend  potassium)      DVT prophylaxis:  Eliquis    CODE STATUS:    Level Of Support Discussed With: Patient  Code Status (Patient has no pulse and is not breathing): CPR (Attempt to Resuscitate)  Medical Interventions (Patient has pulse or is breathing): Full Support      Expected Discharge  TBD  Expected discharge date/ time has not been documented.      This note has been completed as part of a split-shared workflow.     Signature: Electronically signed by DAVID Magaña, 04/22/24, 8:24 PM EDT.           Attending   Admission Attestation       I have performed an independent face-to-face diagnostic evaluation including performing an independent physical examination.  I approve of the documented plan of care above that was reviewed and developed with the advanced practice clinician (APC) and take responsibility for that plan along with its associated risks.  I have updated the HPI as appropriate.    Brief HPI    Jermaine Singh is a 78 yo m w/ hx cad (remote cabg), parox afib/sss/pacemaker, HFrEF, DM2, PAD, previous toe amputations, chronic right foot wound, ckd 3 (baseline cr ~1.6-2.1), chronically elevated troponins, hypothyroidism. Patient presents w/ profound worsening generalized weakness and recent dysuria. Workup revealed wbc 10,310, hgb 10, plts 108,000, iron level 22, iron sat 7%, creatinine 1.7, potassium 5.3 (w/ slight hemolysis), lft's normal. U/a (straight cath) showed LE +, 11-20 rbc, 21-50 wbc, 2+ bacteria. Troponin was 123, repeat 119. Cxr negative for acute process, right foot xray soft tissue edema but no emphysema or destructive lesion noted. Initiated on rocephin and admitted to hospitalist service.     Attending Physical Exam:  Temp:  [97.5 °F (36.4 °C)] 97.5 °F (36.4 °C)  Heart Rate:  [84-86] 86  Resp:  [16] 16  BP: (107-130)/(70-74) 110/72    Constitutional:Alert, oriented x 3,appears fatigued; elderly, frail but nontoxic appearing, normal work of breathing at rest  Psych:Normal/appropriate  affect  HEENT:NCAT, oropharynx clear  Neck: neck supple, full range of motion  Neuro: Face symmetric, speech clear, equal , moves all extremities  Cardiac: rrr; BLE 1+ pitting edema  Resp: CTAB, normal effort  GI: abd soft, nontender  Skin: right foot lateral plantal surface w/ blistering but no overt purulent drainage, no tunneling lesion            Result Review:  I have personally reviewed the results from the time of this admission to 4/22/2024 23:29 EDT and agree with these findings:  [x]  Laboratory list / accordion  []  Microbiology  [x]  Radiology  [x]  EKG/Telemetry   []  Cardiology/Vascular   []  Pathology  [x]  Old records  []  Other:  Most notable findings include: see hpi and above    Assessment and Plan:    See assessment and plan documented by APC above and updated/edited by me as appropriate.    Artur Avila MD  04/22/24                     Electronically signed by Artur Avila MD at 04/22/24 4813       Current Facility-Administered Medications   Medication Dose Route Frequency Provider Last Rate Last Admin    acetaminophen (TYLENOL) tablet 650 mg  650 mg Oral Q6H PRN Maricel Peraza APRN   650 mg at 05/07/24 1216    albuterol (PROVENTIL) nebulizer solution 0.083% 2.5 mg/3mL  2.5 mg Nebulization BID PRN Nathalia Mckeon DNP, APRN   2.5 mg at 04/30/24 0755    apixaban (ELIQUIS) tablet 2.5 mg  2.5 mg Oral Q12H Antonio Angeles MD   2.5 mg at 05/07/24 0826    aspirin chewable tablet 81 mg  81 mg Oral Daily Maricel Peraza, APRN   81 mg at 05/07/24 0825    atorvastatin (LIPITOR) tablet 20 mg  20 mg Oral Nightly Golden Madison MD   20 mg at 05/06/24 2122    castor oil-balsam peru (VENELEX) ointment 1 Application  1 Application Topical Daily Eyad Ledesma MD   1 Application at 05/07/24 0826    dextrose (D50W) (25 g/50 mL) IV injection 10-50 mL  10-50 mL Intravenous Q15 Min PRN Jose Ramon Murphy MD        dextrose (GLUTOSE) oral gel 15 g  15 g Oral Q15 Min PRN  Jose Ramon Murphy MD        furosemide (LASIX) injection 80 mg  80 mg Intravenous Daily Blake Tilley MD   80 mg at 05/07/24 0825    glucagon (GLUCAGEN) injection 1 mg  1 mg Intramuscular Q15 Min PRN Jose Ramon Murphy MD        hydrocortisone (CORTEF) tablet 50 mg  50 mg Oral BID With Meals Fran Flores MD        insulin glargine (LANTUS, SEMGLEE) injection 1-200 Units  1-200 Units Subcutaneous Nightly - Glucommander Jose Ramon Murphy MD   16 Units at 05/06/24 2123    insulin lispro (humaLOG) injection 1-200 Units  1-200 Units Subcutaneous 4x Daily With Meals & Nightly Jose Ramon Murphy MD   3 Units at 05/07/24 1156    insulin lispro (humaLOG) injection 1-200 Units  1-200 Units Subcutaneous PRN Jose Ramon Murphy MD   4 Units at 05/05/24 1751    levothyroxine (SYNTHROID, LEVOTHROID) tablet 88 mcg  88 mcg Oral Q AM Maricel Peraza APRN   88 mcg at 05/07/24 0521    Magnesium Low Dose Replacement - Follow Nurse / BPA Driven Protocol   Does not apply PRN Maricel Peraza APRN        midodrine (PROAMATINE) tablet 10 mg  10 mg Oral TID AC Golden Madison MD   10 mg at 05/07/24 0521    nitroglycerin (NITROSTAT) SL tablet 0.4 mg  0.4 mg Sublingual Q5 Min PRN Maricel Peraza APRN        pantoprazole (PROTONIX) EC tablet 40 mg  40 mg Oral Q AM Antonio Angeles MD   40 mg at 05/07/24 0521    polyethylene glycol (MIRALAX) packet 17 g  17 g Oral Daily Artur Avila MD   17 g at 05/02/24 1037    prochlorperazine (COMPAZINE) injection 5 mg  5 mg Intravenous Q6H PRN Maricel Peraza APRN   2.5 mg at 04/26/24 1316    sennosides-docusate (PERICOLACE) 8.6-50 MG per tablet 2 tablet  2 tablet Oral BID Antonio Angeles MD   2 tablet at 05/07/24 0825    sodium chloride 0.9 % flush 10 mL  10 mL Intravenous PRN Case, Hailee V., DO        sodium chloride 0.9 % flush 10 mL  10 mL Intravenous Q12H Case, Hailee V., DO   10 mL at 05/07/24 0826    tamsulosin (FLOMAX) 24 hr capsule 0.4 mg  0.4 mg Oral Daily  PerazaMaricel reddy, APRN   0.4 mg at 05/07/24 0825        Physician Progress Notes (last 72 hours)        Fran Flores MD at 05/07/24 1157          Pulmonary/Critical Care Follow-up     LOS: 14 days   Patient Care Team:  Marysol Shrestha MD as PCP - General (Internal Medicine)  Cooper Apodaca MD as Consulting Physician (Cardiology)    Chief Complaint/reason: Cardiac arrest      Subjective     Interval History:   Patient is doing well.  No specific complaints.  Ate breakfast from [employer] and currently drinking coffee.  No fevers or chills.  Denies dyspnea.  Remains off of pressors with adequate blood pressure.      History taken from: Patient, staff    PMH/FH/Social History were reviewed and updated appropriately in the electronic medical record.     Review of Systems:    Review of 14 systems was completed with positives and pertinent negatives noted in the subjective section.  All other systems reviewed and are negative.         Objective     Vital Signs  Temp:  [97.7 °F (36.5 °C)-99.7 °F (37.6 °C)] 98.1 °F (36.7 °C)  Heart Rate:  [64-80] 67  Resp:  [16-20] 18  BP: ()/() 104/82  05/06 0701 - 05/07 0700  In: 720 [P.O.:720]  Out: 685 [Urine:685]  Body mass index is 24.13 kg/m².     IV drips:        Physical Exam:     Constitutional:   Alert, in no acute distress   Head:   Normocephalic, atraumatic   Eyes:           Lids and lashes normal, conjunctivae and sclerae normal.  PER   ENMT:  Ears appear intact with no abnormalities noted     Lips normal.     Neck:  Trachea midline, no JVD   Lungs/Resp:    Normal effort, symmetric chest rise, no crepitus, minimal rales and slightly diminished breath sounds at bases.          Heart/CV:   Regular rhythm and normal rate, no murmur   Abdomen/GI:    Soft, nontender, nondistended   :    Deferred   Extremities/MSK:  No clubbing or cyanosis.  Trace bilateral lower extremity   Pulses:  Pulses palpable and equal bilaterally   Skin:  No bleeding, bruising or  rash   Heme/Lymph:  No cervical or supraclavicular adenopathy.   Neurologic:    Psychiatric:    Moves all extremities with no obvious focal motor deficit.  Cranial nerves 2 - 12 grossly intact  Non-agitated, normal affect.    The above physical exam findings were reviewed and reflect my exam findings as of today's exam.   Electronically signed by:  Fran Flores MD  05/07/24  11:58 EDT      Results Review:     I reviewed the patient's new clinical results.   Results from last 7 days   Lab Units 05/07/24 0538 05/06/24 0400 05/05/24 0523 05/04/24 0528 05/03/24  0441   SODIUM mmol/L 136 137 142 142 140   POTASSIUM mmol/L 4.4 4.3 4.1 4.0 4.2   CHLORIDE mmol/L 104 104 109* 110* 109*   CO2 mmol/L 20.0* 20.0* 18.0* 20.0* 18.0*   BUN mg/dL 79* 77* 71* 72* 79*   CREATININE mg/dL 2.84* 2.83* 2.81* 2.95* 3.38*   CALCIUM mg/dL 8.2* 8.1* 8.2* 8.0* 7.9*   BILIRUBIN mg/dL  --   --  0.5 0.4 0.4   ALK PHOS U/L  --   --  75 77 74   ALT (SGPT) U/L  --   --  65* 84* 106*   AST (SGOT) U/L  --   --  11 12 13   GLUCOSE mg/dL 187* 252* 159* 104* 110*     Results from last 7 days   Lab Units 05/07/24 0538 05/06/24 0400 05/05/24  0523   WBC 10*3/mm3 10.63 7.91 6.61   HEMOGLOBIN g/dL 8.8* 8.4* 9.0*   HEMATOCRIT % 27.4* 26.1* 27.6*   PLATELETS 10*3/mm3 223 207 187         Results from last 7 days   Lab Units 05/07/24 0538 05/06/24 0400 05/04/24  0528 05/02/24  0433   MAGNESIUM mg/dL 2.6*  --  2.5* 2.8*   PHOSPHORUS mg/dL 3.9 4.6* 4.5 4.9*     proBNP 5/6/2024: Greater than 70,000    I reviewed the patient's new imaging including images and reports.    Chest x-ray 5/6/2024 shows probable small bilateral pleural effusions and borderline cardiomegaly with otherwise no acute radiographic chest abnormality.    Medication Review:   apixaban, 2.5 mg, Oral, Q12H  aspirin, 81 mg, Oral, Daily  atorvastatin, 20 mg, Oral, Nightly  castor oil-balsam peru, 1 Application, Topical, Daily  furosemide, 80 mg, Intravenous, Daily  hydrocortisone sodium  succinate, 50 mg, Intravenous, Q8H  insulin glargine, 1-200 Units, Subcutaneous, Nightly - Glucommander  insulin lispro, 1-200 Units, Subcutaneous, 4x Daily With Meals & Nightly  levothyroxine, 88 mcg, Oral, Q AM  midodrine, 10 mg, Oral, TID AC  pantoprazole, 40 mg, Oral, Q AM  polyethylene glycol, 17 g, Oral, Daily  senna-docusate sodium, 2 tablet, Oral, BID  sodium chloride, 10 mL, Intravenous, Q12H  tamsulosin, 0.4 mg, Oral, Daily             Assessment & Plan         Cardiac arrest    Type 2 diabetes mellitus with hyperglycemia    Hyperlipidemia LDL goal <70    Hypothyroidism (acquired)    Coronary artery disease involving native coronary artery of native heart with angina pectoris    Pacemaker    HFrEF (heart failure with reduced ejection fraction)    Anemia, chronic disease    Stage 3b chronic kidney disease    UTI (urinary tract infection)    Acute respiratory failure with hypoxia    Elevated troponin level not due myocardial infarction    79 y.o. with history of T2DM, PAF, SSS status post PPM placement, CHF, PVD, left diabetic foot infection, bilateral DVTs, on Eliquis, who presented to [facility] on 4/22/2024 with weakness and chronic right lower extremity wound.  UA was consistent with UTI.  He was started on Rocephin and admitted to the hospitalist service.    On 4/25/2024, he suffered a V-fib arrest and was intubated during CPR.  He was transferred to the intensive care unit.  He was able to be extubated on 4/26/2024.  After extubation, he had a tenuous respiratory status and family made him a DNR/DNI.  Patient was seen by infectious disease who managed antibiotics.  Patient has had ischemic hepatitis which has been improving.  He has required low-dose norepinephrine and midodrine.  LVEF 21 to 25%.    Plan:  1.  For hypotension: Multifactorial, cardiac with possible adrenal insufficiency.  Initiated on stress dose steroids with hydrocortisone and fludrocortisone on 5/4/2024.  Off of fludrocortisone and  decreased hydrocortisone to 50 mg IV every 8 hours on 5/6/2024.  I will go ahead and decrease this to 50 mg p.o. every 12 hours.  Continue midodrine.  Okay to telemetry/hospitalist at this point.  2.  For T2DM: Insulin per Glucomander.  3.  For hypothyroidism: Levothyroxine.  4.  For PAF/history of DVT: Eliquis.  5.  For BPH: Tamsulosin.  6.  Okay to telemetry/hospitalist.  7.  Goals of care: Hospice referral made with patient/family meeting with hospice planned for today.  8.  For congestive heart failure: LVEF 21 to 25%.  Agree with diuresis as ordered by nephrology      MDM:    Problem(s) High due to: Acute or Chronic illness or injury that may poses a threat to life or bodily function  Data: High due to: Review of the result(s) of each unique test and Independent interpretation of a test performed by another physician/NPP (not separately reported)  Risk: Low risk of morbidity from additional diagnostic testing or treatment    High    Electronically signed by:    Fran Flores MD  05/07/24  11:58 EDT      *. Please note that portions of this note were completed with DGSE - a voice recognition program.     Electronically signed by Fran Flores MD at 05/07/24 1200       Cooper Apodaca MD at 05/07/24 0949          Jermaine Singh  1945  [id number]    Patient remains on RA, off pressor support.  Renal function stable, Cr 2.84.  H/H stable.    Vitals:    05/07/24 0800   BP: 124/100   Pulse: 72   Resp: 18   Temp: 98.1 °F (36.7 °C)   SpO2: 96%     Cardiology will continue to follow from periphery.  Plan for today is for family (patient/wife/daughter) to further discuss Hospice care/services.    Megan Shukla PA-C  Electronically signed by RANULFO Mejia, 05/07/24, 9:51 AM EDT.      Electronically signed by Cooper Apodaca MD at 05/07/24 1007       Farooq Ponce MD at 05/07/24 0948             LOS: 14 days    Patient Care Team:  Marysol Shrestha MD as PCP - General  "(Internal Medicine)  Cooper Apodaca MD as Consulting Physician (Cardiology)    Subjective     Stable overnight    Objective     Vital Signs:  Blood pressure 124/100, pulse 72, temperature 98.1 °F (36.7 °C), temperature source Oral, resp. rate 18, height 190 cm (74.8\"), weight 87.1 kg (192 lb 0.3 oz), SpO2 96%.      Intake/Output Summary (Last 24 hours) at 5/7/2024 0948  Last data filed at 5/7/2024 0800  Gross per 24 hour   Intake 960 ml   Output 645 ml   Net 315 ml        05/06 0701 - 05/07 0700  In: 720 [P.O.:720]  Out: 685 [Urine:685]    Physical Exam:        GENERAL: WD WM NAD  NEURO: Awake and alert, oriented. No focal deficit  PSYCHIATRIC: NMA. Cooperative with PE  EYE: PE, no icterus, no conjunctivitis  ENT: ommm, dentition intact,  Hearing intact  NECK: Supple , No JVD discernable,  Trachea midline  CV: No edema, RRR  LUNGS:  Quiet,  Nonlabored resp.  Symmetrical expansion  ABDOMEN: Nondistended, soft nontender.  : No Fonseca, no palp bladder  SKIN: Warm and dry without rash      Labs:  Results from last 7 days   Lab Units 05/07/24  0538 05/06/24  0400 05/05/24  0523   WBC 10*3/mm3 10.63 7.91 6.61   HEMOGLOBIN g/dL 8.8* 8.4* 9.0*   PLATELETS 10*3/mm3 223 207 187     Results from last 7 days   Lab Units 05/07/24  0538 05/06/24  0400 05/05/24  0523 05/04/24  0528 05/03/24  0441 05/02/24  0433   SODIUM mmol/L 136 137 142 142 140 136   POTASSIUM mmol/L 4.4 4.3 4.1 4.0 4.2 4.4   CHLORIDE mmol/L 104 104 109* 110* 109* 107   CO2 mmol/L 20.0* 20.0* 18.0* 20.0* 18.0* 17.0*   BUN mg/dL 79* 77* 71* 72* 79* 82*   CREATININE mg/dL 2.84* 2.83* 2.81* 2.95* 3.38* 3.30*   CALCIUM mg/dL 8.2* 8.1* 8.2* 8.0* 7.9* 7.8*   PHOSPHORUS mg/dL 3.9 4.6*  --  4.5  --  4.9*   MAGNESIUM mg/dL 2.6*  --   --  2.5*  --  2.8*   ALBUMIN g/dL  --  3.3* 3.3* 2.8* 2.8* 2.8*     Results from last 7 days   Lab Units 05/05/24  0523   ALK PHOS U/L 75   BILIRUBIN mg/dL 0.5   ALT (SGPT) U/L 65*   AST (SGOT) U/L 11                   Estimated " Creatinine Clearance: 26 mL/min (A) (by C-G formula based on SCr of 2.84 mg/dL (H)).        A/P:    ARF: Creatinine remains stable at 2.8.  Urine output borderline nonoliguric.  Creatinine increased post acute MI with likely underlying hemodynamic injury with ATN along with prerenal azotemia due to poor cortical perfusion with cardiogenic shock..  For now continue supportive care.  Strict I's and O's.  Monitor renal function closely for further improvement.    CKD 3: Previous baseline creatinine 1.7-2.0.  Follows with Dr. Pal.    Cardiogenic shock: Blood pressure stable today.  Patient on midodrine, hydrocortisone..  Weaned off pressors .  Continue to monitor for now.    Metabolic acidosis: Bicarb borderline.  Monitor for now.    Anemia: Hemoglobin below goal.  Transfuse as indicated for Hgb <7.    Volume: Stable overnight..  Monitor for euvolemia.  Patient with low oncotic pressure.  May have component of third spaced edema.  Give albumin as needed for fluid mobilization with diuresis.    CAD: Patient post V-fib arrest.  EF <20%.  Severe multivessel disease not amendable to surgery or percutaneous intervention.  Cardiology following.    High risk and complexity patient.  May choose to go home with hospice.  Would support that decision.      Farooq Ponce MD  05/07/24  09:48 EDT          Electronically signed by Farooq Ponce MD at 05/07/24 0951       Lincoln Padilla MD at 05/06/24 1242              INFECTIOUS DISEASE Progress Note    Jermaine JIMENES Francisco  1945  [id number]    Admission Date: 4/22/2024      Requesting Provider: No ref. provider found  Evaluating Physician: Lincoln Padilla MD    Reason for Consultation:  UTI    History of present illness:    4/23/24: Patient is a 79 y.o. male  with a history of type 2 diabetes mellitus, peripheral neuropathy, stage III chronic kidney disease, bilateral DVTs, paroxysmal atrial fibrillation, sick sinus syndrome status post pacemaker placement,  CHF, peripheral vascular disease, and left diabetic foot infection who is seen today for evaluation of UTI.  He had a history of UTI last month with urine cultures and early March growing yeast.  He was apparently treated with an antifungal agent.    Over the last few days he noted the onset of profound weakness and fatigue.  He also noticed some dysuria.  He was brought to the emergency room and found to have pyuria and bacteriuria with leukocytosis.   Urine culture was sent and he was started on ceftriaxone.   His white blood cell count today is 11.8.   He was noted to have a blister over his right fifth metatarsal head and a chronic right pretibial wound.    4/24/24: He remains afebrile.  His urine culture was finalized as negative.  I have asked the laboratory to hold the urine culture for yeast but they did not and it was disposed of last night.   He states that his dysuria is improved today.  Feels better.     4/25/2024:  He remains afebrile.  White blood cell count today is 6.7.  Creatinine is 1.99.   Urinalysis yesterday revealed too numerous to count white blood cells.  He denied dysuria this morning.  After I saw him he suffered a V-fib cardiac arrest and was transferred to the ICU.  He is endotracheally intubated    4/26/24:   His maximum temperature over the last 24 hours is 99.5.  Repeat urine culture from 4/24 is pending.  He remains on ventilatory support.    Creatinine is 2.28.  ALT is  1028.  White blood cell count is 9.95.  O2 saturation is 100% on an FiO2 of 30%.  Chest x-ray reveals no pulmonary infiltrates. \    Later this morning he was extubated.  He is confused.  He knows his name and his birthdate.  He cannot answer more complex questions.    4/27/2024:  His maximum temperature over the last 24 hours is 99.5.  His left ventricular ejection fraction is less than 20%.  His white blood cell count is 2.37.  ALT is down to 626.   Chest x-ray 4/27 reveals moderate hemoglobin airspace disease.   O2  saturation is 99% on 4 L.   He is on Zosyn therapy.   Later today his oxygen demand increased and he was placed   On BiPAP.     4/28/2024:   He has remained afebrile.   White blood cell count is 10.1. His creatinine is 2.65 ALT is 450.  His oxygen demand is down to 1 liter today. Chest x-ray today reveals interval improvement in the right upper lobe infiltrate.  He is coughing and has some sputum production.  He remains confused and did not recognize his wife and daughter today      4/29/2024: He remains afebrile.  His creatinine is 2.52.  ALT is 320.  White blood cell count is 9.3.    He is now on 1 L with an O2 saturation of 96%.  He is more alert and responsive today.  He is less confused.     4/30/2024: He has remained afebrile.  Creatinine is 2.68.  ALT is 218.   White blood cell count is 9.1.  room air. room air.  His confusion is improved today.  He recognized his wife today.  He denies increased cough and sputum production.  He knows me by name today.    5/1/2024:  He remains afebrile.  His creatinine today is 3.39.   White blood cell count is 8.9. O2 saturation is 97% on room air.      5/2/2024:  He remains afebrile.  His creatinine today is 3.3.  White blood cell count is 10.7.   Urine eosinophil smear was 0. He has continued to require vasopressor therapy.   Echocardiogram from yesterday reveals a 21-25% ejection fraction.   He feels better today.  His daughter indicates that  is cognitive function has improved and he is no longer confused.    5/3/2024:  Maximum temperature over the last 24 hours is 99.4.  Creatinine is 3.38.    ALT is 106.   white blood cell count is 10.4.  O2 saturation is 94% on room air.  He denies increased dyspnea.  He is still requiring low-dose vasopressor support.    5/4/24: History reviewed.  I am following the patient for Dr. Lincoln Padilla over the weekend. The patient denies any worsening shortness of breath.  Breathing is nonlabored on room air.  He has not had any fevers.   He is now off of antibiotics since 5/1.  Creatinine was downtrending to 2.95, from 3.38.  White blood count is normal at 9.42.  He denies any nausea, vomiting, or diarrhea.  He denies peripheral edema.  Right lower extremity with Unna boot in place.    5/5/24: Patient had a temperature up to 100.2°F today.  No shaking chills.  Fonseca catheter remains in place.  No abdominal pain.  No worsening shortness of breath or productive cough.  No new rashes reported.  No increased leg pain.  He denies any diarrhea.    5/6/2024: His maximum temperature over the last 24 hours is 100.2.  He had a low-grade fever to 99.7 this morning.  Chest x-ray reveals mild left basilar atelectasis.  Creatinine is 2.83.  White blood cell count is 7.9.  ProBNP is greater than 70,000.  He denies increased dyspnea, cough, sputum production.  He still has a Fonseca catheter in place    Past Medical History:   Diagnosis Date    Allergic     Arthritis     Asthma     Coronary artery disease     Diabetes mellitus 2000    started on inuslin 12/2018; started on po meds in 2000; checking blood sugars daily     Diabetic foot infection 07/23/2023    Disease of thyroid gland     po meds daily for hypothyroidism     Elevated serum creatinine 11/15/2022    Elevated troponin 10/02/2022    Generalized weakness 11/15/2022    History of fracture as a child     rt leg- severe     Hyperlipidemia     Hypertension     Hypothyroidism     PAF (paroxysmal atrial fibrillation) 07/23/2023    Peripheral neuropathy     Peripheral vascular disease     s/p angiogram 2/19-needs stent in left leg     Pleural effusion, bilateral 11/15/2022    Proximal phalanx fracture of the second digit extending into the second metatarsal joint 07/28/2022    RBBB     Right knee pain     Status post amputation of great toe and second toe of left foot 07/23/2023    Status post amputation of great toe, left 11/15/2022    Unstable angina 02/12/2019    Added automatically from request for  surgery [id number]    Vitamin D deficiency        Past Surgical History:   Procedure Laterality Date    AMPUTATION DIGIT Left 01/17/2023    Procedure: AMPUTATION DIGIT LEFT;  Surgeon: Gopal Márquez MD;  Location:  [facility];  Service: Vascular;  Laterality: Left;    APPENDECTOMY      CARDIAC CATHETERIZATION N/A 02/14/2019    Procedure: Left Heart Cath;  Surgeon: Cooper Apodaca MD;  Location:  [facility];  Service: Cardiology    CARDIAC ELECTROPHYSIOLOGY PROCEDURE N/A 05/18/2022    Procedure: DEVICE IMPLANT;  Surgeon: Cooper Apodaca MD;  Location:  [facility];  Service: Cardiology;  Laterality: N/A;    CARDIAC SURGERY      COLONOSCOPY      CORONARY ARTERY BYPASS GRAFT N/A 03/22/2019    Procedure: MEDIAN STERNOTOMY, CORONARY ARTERY BYPASS GRAFT X3, UTILIZING THE LEFT INTERNAL MAMMARY ARTERY, EVH AND OPEN HARVEST OF THE RIGHT GREATER SAPHENOUS VEIN, EXPLORATION OF THE LEFT LEG;  Surgeon: Ap Au MD;  Location:  [facility];  Service: Cardiothoracic    EYE SURGERY Bilateral     cataracts     FRACTURE SURGERY      INTERVENTIONAL RADIOLOGY PROCEDURE N/A 07/29/2021    Procedure: Abdominal Aortagram with Runoff;  Surgeon: Jermaine Hernandez MD;  Location:  [facility];  Service: Cardiovascular;  Laterality: N/A;    KNEE ARTHROSCOPY      right x 2, left x 1    LACERATION REPAIR      right leg    LEG SURGERY      2 for fracture of rt leg     TONSILLECTOMY      Adnoidectomy    TRANS METATARSAL AMPUTATION Left 08/02/2022    Procedure: GREAT TOE AMPUTATION LEFT;  Surgeon: Gopal Márquez MD;  Location:  [facility];  Service: Vascular;  Laterality: Left;       Family History   Problem Relation Age of Onset    Coronary artery disease Mother     Diabetes Mother     Hyperlipidemia Mother     Cancer Father        Social History     Socioeconomic History    Marital status:     Number of children: 2   Tobacco Use    Smoking status: Never     Passive exposure:  Past    Smokeless tobacco: Never   Vaping Use    Vaping status: Never Used   Substance and Sexual Activity    Alcohol use: Not Currently     Comment: every 2-3 months    Drug use: No    Sexual activity: Not Currently     Partners: Female       Allergies   Allergen Reactions    Vancomycin Other (See Comments)     Kidney failure per last admission         Medication:    Current Facility-Administered Medications:     acetaminophen (TYLENOL) tablet 650 mg, 650 mg, Oral, Q6H PRN, Maricel Peraza, APRN, 650 mg at 05/05/24 2351    albuterol (PROVENTIL) nebulizer solution 0.083% 2.5 mg/3mL, 2.5 mg, Nebulization, BID PRN, Nathalia Mckeon DNP, APRN, 2.5 mg at 04/30/24 0755    apixaban (ELIQUIS) tablet 2.5 mg, 2.5 mg, Oral, Q12H, Antonio Angeles MD, 2.5 mg at 05/06/24 0806    aspirin chewable tablet 81 mg, 81 mg, Oral, Daily, Maricel Peraza APRN, 81 mg at 05/06/24 0806    atorvastatin (LIPITOR) tablet 20 mg, 20 mg, Oral, Nightly, Golden Madison MD, 20 mg at 05/05/24 2146    castor oil-balsam peru (VENELEX) ointment 1 Application, 1 Application, Topical, Daily, Eyad Ledesma MD, 1 Application at 05/06/24 0806    dextrose (D50W) (25 g/50 mL) IV injection 10-50 mL, 10-50 mL, Intravenous, Q15 Min PRN, oJse Ramon Murphy MD    dextrose (GLUTOSE) oral gel 15 g, 15 g, Oral, Q15 Min PRN, Jose Ramon Murphy MD    furosemide (LASIX) injection 80 mg, 80 mg, Intravenous, Daily, Blake Tilley MD, 80 mg at 05/06/24 0806    glucagon (GLUCAGEN) injection 1 mg, 1 mg, Intramuscular, Q15 Min PRN, Jose Ramon Murphy MD    HYDROcodone-acetaminophen (NORCO) 5-325 MG per tablet 1 tablet, 1 tablet, Oral, Q4H PRN, Antonio Angeles MD, 1 tablet at 05/05/24 1011    [COMPLETED] Hydrocortisone Sod Suc (PF) (Solu-CORTEF) injection 100 mg, 100 mg, Intravenous, Once, 100 mg at 05/04/24 0951 **FOLLOWED BY** Hydrocortisone Sod Suc (PF) (Solu-CORTEF) injection 50 mg, 50 mg, Intravenous, Q8H, Fran Flores MD    insulin glargine (LANTUS,  SEMGLEE) injection 1-200 Units, 1-200 Units, Subcutaneous, Nightly - Glucommander, Jose Ramon Murphy MD, 13 Units at 05/05/24 2146    insulin lispro (humaLOG) injection 1-200 Units, 1-200 Units, Subcutaneous, 4x Daily With Meals & Nightly, Jose Ramon Murphy MD, 4 Units at 05/06/24 1152    insulin lispro (humaLOG) injection 1-200 Units, 1-200 Units, Subcutaneous, PRN, Jose Ramon Murphy MD, 4 Units at 05/05/24 1751    levothyroxine (SYNTHROID, LEVOTHROID) tablet 88 mcg, 88 mcg, Oral, Q AM, Maricel Peraza APRN, 88 mcg at 05/06/24 0537    Magnesium Low Dose Replacement - Follow Nurse / BPA Driven Protocol, , Does not apply, PRN, Maricel Peraza APRN    midodrine (PROAMATINE) tablet 10 mg, 10 mg, Oral, TID AC, Golden Madison MD, 10 mg at 05/06/24 0537    nitroglycerin (NITROSTAT) SL tablet 0.4 mg, 0.4 mg, Sublingual, Q5 Min PRN, Maricel Peraza APRN    norepinephrine (LEVOPHED) 8 mg in 250 mL NS infusion (premix), 0.02-0.3 mcg/kg/min, Intravenous, Titrated, Eyad Ledesma MD, Stopped at 05/05/24 1242    pantoprazole (PROTONIX) EC tablet 40 mg, 40 mg, Oral, Q AM, Antonio Angeles MD, 40 mg at 05/06/24 0537    polyethylene glycol (MIRALAX) packet 17 g, 17 g, Oral, Daily, WestArtur MD, 17 g at 05/02/24 1037    prochlorperazine (COMPAZINE) injection 5 mg, 5 mg, Intravenous, Q6H PRN, Maricel Peraza APRN, 2.5 mg at 04/26/24 1316    sennosides-docusate (PERICOLACE) 8.6-50 MG per tablet 2 tablet, 2 tablet, Oral, BID, 2 tablet at 05/02/24 2107 **AND** [DISCONTINUED] polyethylene glycol (MIRALAX) packet 17 g, 17 g, Oral, Daily PRN, 17 g at 04/30/24 2038 **AND** [DISCONTINUED] bisacodyl (DULCOLAX) EC tablet 5 mg, 5 mg, Oral, Daily PRN, 5 mg at 05/01/24 0956 **AND** [DISCONTINUED] bisacodyl (DULCOLAX) suppository 10 mg, 10 mg, Rectal, Daily PRN, Antonio Angeles MD    sodium chloride 0.9 % flush 10 mL, 10 mL, Intravenous, PRN, Hailee Shukla,     sodium chloride 0.9 % flush 10 mL, 10 mL,  Intravenous, Q12H, Case, Hailee V., DO, 10 mL at 24 0806    tamsulosin (FLOMAX) 24 hr capsule 0.4 mg, 0.4 mg, Oral, Daily, Maricel Peraza, DAVID, 0.4 mg at 24 0806    Antibiotics:  Anti-Infectives (From admission, onward)      Ordered     Dose/Rate Route Frequency Start Stop    24 1356  piperacillin-tazobactam (ZOSYN) 3.375 g IVPB in 100 mL NS MBP (CD)        Ordering Provider: Eyad Ledesma MD    3.375 g  over 30 Minutes Intravenous Once 24 1445 24 1557              Review of Systems:  See HPI      Physical Exam:   Vital Signs  Temp (24hrs), Av.7 °F (37.1 °C), Min:98 °F (36.7 °C), Max:99.7 °F (37.6 °C)    Temp  Min: 98 °F (36.7 °C)  Max: 99.7 °F (37.6 °C)  BP  Min: 78/68  Max: 117/80  Pulse  Min: 61  Max: 88  Resp  Min: 16  Max: 20  SpO2  Min: 92 %  Max: 100 %    GENERAL:   Alert and responsive. Sitting up in bed  HEENT: Normocephalic, atraumatic.  No external oral lesions noted  NECK: Supple   HEART:  irregular rate, No murmur   LUNGS:   Diminished in the lung bases.  Nonlabored breathing on room air  ABDOMEN: Soft, nontender, nondistended. No rebound or guarding. NO mass or HSM.  EXT: Right lower extremity with Unna boot in place.  No peripheral edema or cellulitic change noted over his left lower extremity.  :   Fonseca catheter in place.   MSK: No joint effusions or erythema  SKIN: No generalized rashes noted.  No peripheral stigmata of infective endocarditis noted.  NEURO:  Awake alert and oriented.  Normal speech and cognition.  Right upper extremity PICC line site is without any erythema    Laboratory Data    Results from last 7 days   Lab Units 24  0400 24  0523 24  0528   WBC 10*3/mm3 7.91 6.61 9.42   HEMOGLOBIN g/dL 8.4* 9.0* 9.2*   HEMATOCRIT % 26.1* 27.6* 28.1*   PLATELETS 10*3/mm3 207 187 238     Results from last 7 days   Lab Units 24  0400   SODIUM mmol/L 137   POTASSIUM mmol/L 4.3   CHLORIDE mmol/L 104   CO2 mmol/L 20.0*   BUN  "mg/dL 77*   CREATININE mg/dL 2.83*   GLUCOSE mg/dL 252*   CALCIUM mg/dL 8.1*     Results from last 7 days   Lab Units 05/05/24  0523   ALK PHOS U/L 75   BILIRUBIN mg/dL 0.5   ALT (SGPT) U/L 65*   AST (SGOT) U/L 11                         Estimated Creatinine Clearance: 26.1 mL/min (A) (by C-G formula based on SCr of 2.83 mg/dL (H)).      Microbiology:  No results found for: \"ACANTHNAEG\", \"AFBCX\", \"BPERTUSSISCX\", \"BLOODCX\"  No results found for: \"BCIDPCR\", \"CXREFLEX\", \"CSFCX\", \"CULTURETIS\"  No results found for: \"CULTURES\", \"HSVCX\", \"URCX\"  No results found for: \"EYECULTURE\", \"GCCX\", \"HSVCULTURE\", \"LABHSV\"  No results found for: \"LEGIONELLA\", \"MRSACX\", \"MUMPSCX\", \"MYCOPLASCX\"  No results found for: \"NOCARDIACX\", \"STOOLCX\"  Urine Culture   Date Value Ref Range Status   04/22/2024 No growth  Preliminary     No results found for: \"VIRALCULTU\", \"WOUNDCX\"        Radiology:  Imaging Results (Last 72 Hours)       Procedure Component Value Units Date/Time    XR Chest 1 View [[id number]] Collected: 05/06/24 0715     Updated: 05/06/24 0720    Narrative:      XR CHEST 1 VW    Date of Exam: 5/6/2024 3:02 AM EDT    Indication: Heart Failure    Comparison: 4/28/2024    Findings:  Patient is status post median sternotomy and CABG. Left chest wall pacemaker is present. There is a right arm PICC which terminates overlying the SVC. There is a small left pleural effusion with left basilar atelectasis. Lungs appear otherwise adequately   aerated. Cardiac silhouette is magnified. No obvious pneumothorax. Osseous structures are unchanged.          Impression:      Impression:  1.Possible small left pleural effusion. Mild left basilar atelectasis.      Electronically Signed: Lucio Lanier MD    5/6/2024 7:17 AM EDT    Workstation ID: [id number]        I read his chest x-ray of 5/6      Impression:    1.    Right upper lobe aspiration pneumonia-he is S/P Zosyn therapy.  He is clinically and radiographically improved.  Zosyn was discontinued " on 5/1   2.  V-fib arrest-status post resuscitation.    3.   Acute kidney injury-superimposed on stage IIIb chronic kidney disease.   4.  Chronic right pretibial dermatitis-without evidence of cellulitis  5.  Type 2 diabetes mellitus  6.  coronary artery disease/status post CABG, 3/22/2019  7.  Severe systolic heart failure- with some improvement on his echocardiogram of 5/1.  8.  Stage IIIb chronic kidney disease  9.  Paroxysmal atrial fibrillation  10.  Acute hypoxic respiratory failure- improved   11.  Ischemic hepatitis- improved  12.  Pyuria- with a negative urine culture.  13.  Anoxic encephalopathy- improved  14.  Right fifth metatarsal wound/bullous lesion  15.  Fever-low-grade.  This may be secondary to atelectasis  I would like to have his Fonseca catheter removed in order to reduce his risk for Fonseca catheter related UTI.    PLAN/RECOMMENDATIONS:   1.  Continue with a right leg Unna boot  2.  Continue off of antibiotic therapy  3.  Remove the Fonseca catheter     I discussed his clinical situation with Dr. Flores and with Dr. Ponce.        Lincoln Padilla MD  5/6/2024  12:42 EDT                  Electronically signed by Lincoln Padilla MD at 05/06/24 1712       Lexi Flores MD at 05/06/24 1119          Palliative Care Daily Progress Note     Referring:  Megan WINCHESTER  C/C: nothing specific    S: Medical record reviewed. Events noted.  Denied SOA or pain.  This morning remembered family meeting scheduled for the afternoon.      ROS:   Denied pain/SOA/N/V/D/C    O: Code Status:   Code Status and Medical Interventions:   Ordered at: 04/26/24 1353     Medical Intervention Limits:    NO intubation (DNI)    NO cardioversion     Level Of Support Discussed With:    Next of Kin (If No Surrogate)    Health Care Surrogate     Code Status (Patient has no pulse and is not breathing):    No CPR (Do Not Attempt to Resuscitate)     Medical Interventions (Patient has pulse or is breathing):    Limited  "Support      Advanced Directives: Advance Directive Status: Patient does not have advance directive   Goals of Care: Ongoing.   Palliative Performance Scale Score: 50%     /73 (BP Location: Left arm, Patient Position: Lying)   Pulse 88   Temp 98.8 °F (37.1 °C) (Bladder)   Resp 18   Ht 190 cm (74.8\")   Wt 87.1 kg (192 lb 0.3 oz)   SpO2 97%   BMI 24.13 kg/m²     Intake/Output Summary (Last 24 hours) at 5/6/2024 1119  Last data filed at 5/6/2024 0800  Gross per 24 hour   Intake 503.4 ml   Output 705 ml   Net -201.6 ml       Physical Exam:    General Appearance:    Alert, cooperative, NAD   HEENT:    NC/AT, EOMI, anicteric, MMM, face relaxed   Neck:   supple, trachea midline, no JVD   Lungs:     CTA bilat, diminished in bases; respirations regular, even     and unlabored    Heart:    RRR, normal S1 and S2, no M/R/G   Abdomen:     Normal bowel sounds, soft, nontender, nondistended   G/U:   Deferred   MSK/Extremities:   No clubbing , cyanosis or edema, No wasting   Pulses:   Pulses palpable and equal bilaterally   Skin:   Warm, dry, no mottling   Neurologic:   A/Ox3, cooperative, moves extremities x 4, no tremor, nl     tone   Psych:   Calm, appropriate       Current Medications:      Current Facility-Administered Medications:     acetaminophen (TYLENOL) tablet 650 mg, 650 mg, Oral, Q6H PRN, Maricel Peraza, APRN, 650 mg at 05/05/24 2351    albuterol (PROVENTIL) nebulizer solution 0.083% 2.5 mg/3mL, 2.5 mg, Nebulization, BID PRN, Nathalia Mckeon DNP, APRN, 2.5 mg at 04/30/24 0755    apixaban (ELIQUIS) tablet 2.5 mg, 2.5 mg, Oral, Q12H, Antonio Angeles MD, 2.5 mg at 05/06/24 0806    aspirin chewable tablet 81 mg, 81 mg, Oral, Daily, Maricel Peraza, APRN, 81 mg at 05/06/24 0806    atorvastatin (LIPITOR) tablet 20 mg, 20 mg, Oral, Nightly, Gricelda, Golden T, MD, 20 mg at 05/05/24 2146    castor oil-balsam peru (VENELEX) ointment 1 Application, 1 Application, Topical, Daily, Eyad Ledesma MD, 1 " Application at 05/06/24 0806    dextrose (D50W) (25 g/50 mL) IV injection 10-50 mL, 10-50 mL, Intravenous, Q15 Min PRN, Jose Ramon Murphy MD    dextrose (GLUTOSE) oral gel 15 g, 15 g, Oral, Q15 Min PRN, Jose Ramon Murphy MD    fludrocortisone tablet 100 mcg, 100 mcg, Oral, Daily, Jose Ramon Murphy MD, 100 mcg at 05/06/24 0806    furosemide (LASIX) injection 80 mg, 80 mg, Intravenous, Daily, Blake Tilley MD, 80 mg at 05/06/24 0806    glucagon (GLUCAGEN) injection 1 mg, 1 mg, Intramuscular, Q15 Min PRN, Jose Ramon Murphy MD    HYDROcodone-acetaminophen (NORCO) 5-325 MG per tablet 1 tablet, 1 tablet, Oral, Q4H PRN, Antonio Angeles MD, 1 tablet at 05/05/24 1011    [COMPLETED] Hydrocortisone Sod Suc (PF) (Solu-CORTEF) injection 100 mg, 100 mg, Intravenous, Once, 100 mg at 05/04/24 0951 **FOLLOWED BY** Hydrocortisone Sod Suc (PF) (Solu-CORTEF) injection 50 mg, 50 mg, Intravenous, Q6H, Jose Ramon Murphy MD, 50 mg at 05/06/24 1020    insulin glargine (LANTUS, SEMGLEE) injection 1-200 Units, 1-200 Units, Subcutaneous, Nightly - Glucommander, Jose Ramon Murphy MD, 13 Units at 05/05/24 2146    insulin lispro (humaLOG) injection 1-200 Units, 1-200 Units, Subcutaneous, 4x Daily With Meals & Nightly, Jose Ramon Murphy MD, 4 Units at 05/06/24 0806    insulin lispro (humaLOG) injection 1-200 Units, 1-200 Units, Subcutaneous, PRN, Jose Ramon Murphy MD, 4 Units at 05/05/24 1751    levothyroxine (SYNTHROID, LEVOTHROID) tablet 88 mcg, 88 mcg, Oral, Q AM, Maricel Peraza, APRN, 88 mcg at 05/06/24 0537    Magnesium Low Dose Replacement - Follow Nurse / BPA Driven Protocol, , Does not apply, PRN, Maricel Peraza, APRN    midodrine (PROAMATINE) tablet 10 mg, 10 mg, Oral, TID AC, Golden Madison MD, 10 mg at 05/06/24 0537    nitroglycerin (NITROSTAT) SL tablet 0.4 mg, 0.4 mg, Sublingual, Q5 Min PRN, Maricel Peraza, DAVID    norepinephrine (LEVOPHED) 8 mg in 250 mL NS infusion (premix), 0.02-0.3 mcg/kg/min, Intravenous, Titrated,  Eyad Ledesma MD, Stopped at 05/05/24 1242    pantoprazole (PROTONIX) EC tablet 40 mg, 40 mg, Oral, Q AM, Antonio Angeles MD, 40 mg at 05/06/24 0537    polyethylene glycol (MIRALAX) packet 17 g, 17 g, Oral, Daily, Artur Avila MD, 17 g at 05/02/24 1037    prochlorperazine (COMPAZINE) injection 5 mg, 5 mg, Intravenous, Q6H PRN, Maricel Peraza APRN, 2.5 mg at 04/26/24 1316    sennosides-docusate (PERICOLACE) 8.6-50 MG per tablet 2 tablet, 2 tablet, Oral, BID, 2 tablet at 05/02/24 2107 **AND** [DISCONTINUED] polyethylene glycol (MIRALAX) packet 17 g, 17 g, Oral, Daily PRN, 17 g at 04/30/24 2038 **AND** [DISCONTINUED] bisacodyl (DULCOLAX) EC tablet 5 mg, 5 mg, Oral, Daily PRN, 5 mg at 05/01/24 0956 **AND** [DISCONTINUED] bisacodyl (DULCOLAX) suppository 10 mg, 10 mg, Rectal, Daily PRN, Antonio Angeles MD    sodium chloride 0.9 % flush 10 mL, 10 mL, Intravenous, PRN, Case, Hailee V., DO    sodium chloride 0.9 % flush 10 mL, 10 mL, Intravenous, Q12H, Case, Hailee V., DO, 10 mL at 05/06/24 0806    tamsulosin (FLOMAX) 24 hr capsule 0.4 mg, 0.4 mg, Oral, Daily, Maricel Peraza APRN, 0.4 mg at 05/06/24 0806     Labs:   Results from last 7 days   Lab Units 05/06/24  0400   WBC 10*3/mm3 7.91   HEMOGLOBIN g/dL 8.4*   HEMATOCRIT % 26.1*   PLATELETS 10*3/mm3 207     Results from last 7 days   Lab Units 05/06/24  0400 05/05/24  0523   SODIUM mmol/L 137 142   POTASSIUM mmol/L 4.3 4.1   CHLORIDE mmol/L 104 109*   CO2 mmol/L 20.0* 18.0*   BUN mg/dL 77* 71*   CREATININE mg/dL 2.83* 2.81*   CALCIUM mg/dL 8.1* 8.2*   BILIRUBIN mg/dL  --  0.5   ALK PHOS U/L  --  75   ALT (SGPT) U/L  --  65*   AST (SGOT) U/L  --  11   GLUCOSE mg/dL 252* 159*     Imaging Results (Last 72 Hours)       Procedure Component Value Units Date/Time    XR Chest 1 View [[id number]] Collected: 05/06/24 0715     Updated: 05/06/24 0720    Narrative:      XR CHEST 1 VW    Date of Exam: 5/6/2024 3:02 AM EDT    Indication: Heart  Failure    Comparison: 4/28/2024    Findings:  Patient is status post median sternotomy and CABG. Left chest wall pacemaker is present. There is a right arm PICC which terminates overlying the SVC. There is a small left pleural effusion with left basilar atelectasis. Lungs appear otherwise adequately   aerated. Cardiac silhouette is magnified. No obvious pneumothorax. Osseous structures are unchanged.          Impression:      Impression:  1.Possible small left pleural effusion. Mild left basilar atelectasis.      Electronically Signed: Lucio Lanier MD    5/6/2024 7:17 AM EDT    Workstation ID: [id number]                  Diagnostics: Reviewed    A:     Cardiac arrest    Type 2 diabetes mellitus with hyperglycemia    Hyperlipidemia LDL goal <70    Hypothyroidism (acquired)    Coronary artery disease involving native coronary artery of native heart with angina pectoris    Pacemaker    HFrEF (heart failure with reduced ejection fraction)    Anemia, chronic disease    Stage 3b chronic kidney disease    UTI (urinary tract infection)    Acute respiratory failure with hypoxia    Elevated troponin level not due myocardial infarction       Impression:  S/P vfib arrest  ESHD  DM 2  PPM in situ  HFrEF  A/CKD - Cr 3.38  UTI  Acute hypoxic resp failure - improved    Symptoms:   Dyspnea  Rib fracture pain  Debility    P:   Met with spouse and pt later in day.  Answered basic questions re: difference in hospice and palliative care.  Reassured they can certainly try to do rehab and consider hospice if unable.  Tolerance for extensive rehab unknown until he tries.  Spouse and pt planning to talk further with hospice and case mgmt. Palliative Care Team will continue to follow patient.     Lexi Flores MD, 5/6/2024, 11:19 EDT       Electronically signed by Lexi Flores MD at 05/06/24 1524       Cooper Apodaca MD at 05/06/24 0948           [facility] - Progress Note    Jermaine JIMENES  "Francisco  1945  [id number]    05/06/24, 08:29 EDT      Chief Complaint: Following for cardiac arrest    Subjective:   Awake in bed, wife at bedside.  Off pressor support.  Reports no complaints of dyspnea, no chest pain.      Review of Systems:  Pertinent positives are listed above and in physical exam.  All others have been reviewed and are negative.    apixaban, 2.5 mg, Oral, Q12H  aspirin, 81 mg, Oral, Daily  atorvastatin, 20 mg, Oral, Nightly  castor oil-balsam peru, 1 Application, Topical, Daily  fludrocortisone, 100 mcg, Oral, Daily  furosemide, 80 mg, Intravenous, Daily  hydrocortisone sodium succinate, 50 mg, Intravenous, Q6H  insulin glargine, 1-200 Units, Subcutaneous, Nightly - Glucommander  insulin lispro, 1-200 Units, Subcutaneous, 4x Daily With Meals & Nightly  levothyroxine, 88 mcg, Oral, Q AM  midodrine, 10 mg, Oral, TID AC  pantoprazole, 40 mg, Oral, Q AM  polyethylene glycol, 17 g, Oral, Daily  senna-docusate sodium, 2 tablet, Oral, BID  sodium chloride, 10 mL, Intravenous, Q12H  tamsulosin, 0.4 mg, Oral, Daily        Objective:  Vitals:   height is 190 cm (74.8\") and weight is 87.1 kg (192 lb 0.3 oz). His bladder temperature is 98.8 °F (37.1 °C). His blood pressure is 108/64 and his pulse is 79. His respiration is 18 and oxygen saturation is 96%.     Intake/Output Summary (Last 24 hours) at 5/6/2024 0948  Last data filed at 5/6/2024 0800  Gross per 24 hour   Intake 503.4 ml   Output 705 ml   Net -201.6 ml       Physical Exam:  General:  WN, Appears stated age  CV:  Normal S1,S2. No murmur, rub, or gallop. + Pacemaker  Resp:  CTA Soy, equal, nonlabored.  Abd:  Soft, + BS, no organomegaly. Nontender to palpation.  Extrem:  RLE with wrap, trace LLE.           Results from last 7 days   Lab Units 05/06/24  0400   WBC 10*3/mm3 7.91   HEMOGLOBIN g/dL 8.4*   HEMATOCRIT % 26.1*   PLATELETS 10*3/mm3 207     Results from last 7 days   Lab Units 05/06/24  0400 05/05/24  0523   SODIUM mmol/L 137 142   POTASSIUM " "mmol/L 4.3 4.1   CHLORIDE mmol/L 104 109*   CO2 mmol/L 20.0* 18.0*   BUN mg/dL 77* 71*   CREATININE mg/dL 2.83* 2.81*   CALCIUM mg/dL 8.1* 8.2*   BILIRUBIN mg/dL  --  0.5   ALK PHOS U/L  --  75   ALT (SGPT) U/L  --  65*   AST (SGOT) U/L  --  11   GLUCOSE mg/dL 252* 159*                   Results from last 7 days   Lab Units 05/06/24  0400   PROBNP pg/mL >70,000.0*       Tele: V paced      Assessment/Plan:  Cardiac arrest/VF arrest  Substantial substrate for VF arrest including ischemic heart disease with severely diffusely diseased LAD as well as newly identified severe LV dysfunction with EF <20%  Films reviewed by cards 4/25; \"Based on prebypass angiogram, I do not suspect readily \"intervenable\" lesions to correct ischemic substrate.  LAD is severely diffusely diseased and would not be suitable for percutaneous treatment.  Frankly, I am impressed that Dr. Au was able to bypass the LAD in 2019.\"  Recommend against emergent cardiac catheterization given his hemodynamic instability and unlikelihood of \"targets\" to correct ischemic substrate  Continue aspirin and statin  No beta-blocker due to hypotension, resume as pressor support weaned/BP tolerates  Discontinue IV amiodarone.  If recurrence of VT/VF, start lidocaine  Repeat echo reviewed     Severe LV dysfunction with EF <20%  Newly diagnosed.  Last LVEF 48% in 2022  Unclear whether this is related to stunning from VF arrest or whether this has been pre-existing his admission.  I suspect the latter as patient has had dwindling functional capacity  Presently with some element of cardiogenic shock which prohibits GDMT  [facility] pacemaker interrogation reviewed  Repeat echo reviewed     Multifactorial shock  Cardiogenic, possibly septic  Improving        Type 2 diabetes mellitus not on insulin with hyperglycemia  Uncontrolled (untreated?) diabetes prior to admission  Has only not candidate for SGL 2 inhibitor due to renal insufficiency  Start Glucomander protocol   " "  Hyperlipidemia with goal LDL <70  LDL well-controlled  Continue to hold statin although LFTs are improving.     Stage IIIb CKD  Hold losartan due to hypotension and anticipated [place] from cardiac event       History of DVT  Dose adjust Eliquis restarted.     Paroxysmal Atrial Fibrillation  [facility] device interrogated Friday, reviewed.  Report on  chart.  Dose adjust NOAC resumed        Again, Cardiology/Nephrology have spoken with daughter in length.  Patient's medical status is terminal.  Continue current support.  Palliative Care appropriate and daughter understands their role in care of her father. Will consult Palliative services.      I discussed the patient's findings and my recommendations with the patient, any present family members, and the nursing staff.  Cooper Apodaca MD saw and examined patient, verified hx and PE, read all radiographic studies, reviewed labs and micro data, and formulated dx, plan for treatment and all medical decision making.      Megan Shukla PA-C  05/06/24, 09:49 EDT                      Electronically signed by Cooper Apodaca MD at 05/06/24 1024       Farooq Ponce MD at 05/06/24 0934             LOS: 13 days    Patient Care Team:  Marysol Shrestha MD as PCP - General (Internal Medicine)  Cooper Apodaca MD as Consulting Physician (Cardiology)    Subjective     Stable overnight    Objective     Vital Signs:  Blood pressure 108/64, pulse 79, temperature 98.8 °F (37.1 °C), temperature source Bladder, resp. rate 18, height 190 cm (74.8\"), weight 87.1 kg (192 lb 0.3 oz), SpO2 96%.      Intake/Output Summary (Last 24 hours) at 5/6/2024 0934  Last data filed at 5/6/2024 0800  Gross per 24 hour   Intake 503.4 ml   Output 705 ml   Net -201.6 ml        05/05 0701 - 05/06 0700  In: 503.4 [P.O.:480; I.V.:23.4]  Out: 665 [Urine:665]    Physical Exam:        GENERAL: WD WM NAD  NEURO: Awake and alert, oriented. No focal deficit  PSYCHIATRIC: NMA. Cooperative with " PE  EYE: PE, no icterus, no conjunctivitis  ENT: ommm, dentition intact,  Hearing intact  NECK: Supple , No JVD discernable,  Trachea midline  CV: No edema, RRR  LUNGS:  Quiet,  Nonlabored resp.  Symmetrical expansion  ABDOMEN: Nondistended, soft nontender.  : + Fonseca, no palp bladder  SKIN: Warm and dry without rash      Labs:  Results from last 7 days   Lab Units 05/06/24  0400 05/05/24  0523 05/04/24  0528   WBC 10*3/mm3 7.91 6.61 9.42   HEMOGLOBIN g/dL 8.4* 9.0* 9.2*   PLATELETS 10*3/mm3 207 187 238     Results from last 7 days   Lab Units 05/06/24  0400 05/05/24  0523 05/04/24  0528 05/03/24  0441 05/02/24  0433 05/01/24  0339 04/30/24  0447   SODIUM mmol/L 137 142 142 140 136   < > 140   POTASSIUM mmol/L 4.3 4.1 4.0 4.2 4.4   < > 4.3   CHLORIDE mmol/L 104 109* 110* 109* 107   < > 110*   CO2 mmol/L 20.0* 18.0* 20.0* 18.0* 17.0*   < > 17.0*   BUN mg/dL 77* 71* 72* 79* 82*   < > 82*   CREATININE mg/dL 2.83* 2.81* 2.95* 3.38* 3.30*   < > 2.68*   CALCIUM mg/dL 8.1* 8.2* 8.0* 7.9* 7.8*   < > 7.8*   PHOSPHORUS mg/dL 4.6*  --  4.5  --  4.9*  --  4.5   MAGNESIUM mg/dL  --   --  2.5*  --  2.8*  --  2.8*   ALBUMIN g/dL 3.3* 3.3* 2.8* 2.8* 2.8*   < > 3.0*    < > = values in this interval not displayed.     Results from last 7 days   Lab Units 05/05/24 0523   ALK PHOS U/L 75   BILIRUBIN mg/dL 0.5   ALT (SGPT) U/L 65*   AST (SGOT) U/L 11                   Estimated Creatinine Clearance: 26.1 mL/min (A) (by C-G formula based on SCr of 2.83 mg/dL (H)).        A/P:    ARF: Creatinine stable at 2.8.  Urine output borderline nonoliguric.  Creatinine increased post acute MI with likely underlying hemodynamic injury with ATN along with prerenal azotemia due to poor cortical perfusion with cardiogenic shock..  For now continue supportive care.  Strict I's and O's.  Monitor renal function closely for further improvement.    CKD 3: Previous baseline creatinine 1.7-2.0.  Follows with Dr. Pal.    Cardiogenic shock: Patient on  midodrine, fludrocortisone, hydrocortisone..  Weaned off pressors yesterday.  Continue to monitor for now.    Metabolic acidosis: Bicarb borderline.  Monitor for now.    Anemia: Hemoglobin below goal.  Transfuse as indicated for Hgb <7.    Volume: Negative daily on diuretics.  Monitor for euvolemia.  Patient with low oncotic pressure.  May have component of third spaced edema.  Give albumin as needed for fluid mobilization with diuresis.    CAD: Patient post V-fib arrest.  EF <20%.  Severe multivessel disease not amendable to surgery or percutaneous intervention.  Cardiology following.    High risk and complexity patient.      Farooq Ponce MD  05/06/24  09:34 EDT          Electronically signed by Farooq Ponce MD at 05/06/24 0946       Fran Flores MD at 05/06/24 0806          Pulmonary/Critical Care Follow-up     LOS: 13 days   Patient Care Team:  Marysol Shrestha MD as PCP - General (Internal Medicine)  Cooper Apodaca MD as Consulting Physician (Cardiology)    Chief Complaint/reason: Cardiac arrest      Subjective     Interval History:   Patient is doing well.  No specific complaints.  Ate breakfast.  Looking forward to getting some [employer].  No fevers or chills.  Denies dyspnea.    History taken from: Patient, staff    PMH/FH/Social History were reviewed and updated appropriately in the electronic medical record.     Review of Systems:    Review of 14 systems was completed with positives and pertinent negatives noted in the subjective section.  All other systems reviewed and are negative.         Objective     Vital Signs  Temp:  [98 °F (36.7 °C)-99.7 °F (37.6 °C)] 99.7 °F (37.6 °C)  Heart Rate:  [61-88] 71  Resp:  [16-20] 18  BP: ()/(57-81) 106/79  05/05 0701 - 05/06 0700  In: 503.4 [P.O.:480; I.V.:23.4]  Out: 665 [Urine:665]  Body mass index is 24.13 kg/m².     IV drips:  norepinephrine, Last Rate: Stopped (05/05/24 1242)       Physical Exam:     Constitutional:   Alert,  in no acute distress   Head:   Normocephalic, atraumatic   Eyes:           Lids and lashes normal, conjunctivae and sclerae normal.  PER   ENMT:  Ears appear intact with no abnormalities noted     Lips normal.     Neck:  Trachea midline, no JVD   Lungs/Resp:    Normal effort, symmetric chest rise, no crepitus, minimal rales and slightly diminished breath sounds at bases.          Heart/CV:   Regular rhythm and normal rate, no murmur   Abdomen/GI:    Soft, nontender, nondistended   :    Deferred   Extremities/MSK:  No clubbing or cyanosis.  Trace bilateral lower extremity   Pulses:  Pulses palpable and equal bilaterally   Skin:  No bleeding, bruising or rash   Heme/Lymph:  No cervical or supraclavicular adenopathy.   Neurologic:    Psychiatric:    Moves all extremities with no obvious focal motor deficit.  Cranial nerves 2 - 12 grossly intact  Non-agitated, normal affect.    The above physical exam findings were reviewed and reflect my exam findings as of today's exam.   Electronically signed by:  Fran Flores MD  05/06/24  12:26 EDT      Results Review:     I reviewed the patient's new clinical results.   Results from last 7 days   Lab Units 05/06/24  0400 05/05/24  0523 05/04/24  0528 05/03/24  0441   SODIUM mmol/L 137 142 142 140   POTASSIUM mmol/L 4.3 4.1 4.0 4.2   CHLORIDE mmol/L 104 109* 110* 109*   CO2 mmol/L 20.0* 18.0* 20.0* 18.0*   BUN mg/dL 77* 71* 72* 79*   CREATININE mg/dL 2.83* 2.81* 2.95* 3.38*   CALCIUM mg/dL 8.1* 8.2* 8.0* 7.9*   BILIRUBIN mg/dL  --  0.5 0.4 0.4   ALK PHOS U/L  --  75 77 74   ALT (SGPT) U/L  --  65* 84* 106*   AST (SGOT) U/L  --  11 12 13   GLUCOSE mg/dL 252* 159* 104* 110*     Results from last 7 days   Lab Units 05/06/24  0400 05/05/24  0523 05/04/24  0528   WBC 10*3/mm3 7.91 6.61 9.42   HEMOGLOBIN g/dL 8.4* 9.0* 9.2*   HEMATOCRIT % 26.1* 27.6* 28.1*   PLATELETS 10*3/mm3 207 187 238         Results from last 7 days   Lab Units 05/06/24  0400 05/04/24  0528 05/02/24  0436  04/30/24  0447   MAGNESIUM mg/dL  --  2.5* 2.8* 2.8*   PHOSPHORUS mg/dL 4.6* 4.5 4.9* 4.5     proBNP 5/6/2024: Greater than 70,000    I reviewed the patient's new imaging including images and reports.    Chest x-ray 5/6/2024 shows probable small bilateral pleural effusions and borderline cardiomegaly with otherwise no acute radiographic chest abnormality.    Medication Review:   apixaban, 2.5 mg, Oral, Q12H  aspirin, 81 mg, Oral, Daily  atorvastatin, 20 mg, Oral, Nightly  castor oil-balsam peru, 1 Application, Topical, Daily  fludrocortisone, 100 mcg, Oral, Daily  furosemide, 80 mg, Intravenous, Daily  hydrocortisone sodium succinate, 50 mg, Intravenous, Q6H  insulin glargine, 1-200 Units, Subcutaneous, Nightly - Glucommander  insulin lispro, 1-200 Units, Subcutaneous, 4x Daily With Meals & Nightly  levothyroxine, 88 mcg, Oral, Q AM  midodrine, 10 mg, Oral, TID AC  pantoprazole, 40 mg, Oral, Q AM  polyethylene glycol, 17 g, Oral, Daily  senna-docusate sodium, 2 tablet, Oral, BID  sodium chloride, 10 mL, Intravenous, Q12H  tamsulosin, 0.4 mg, Oral, Daily      norepinephrine, 0.02-0.3 mcg/kg/min, Last Rate: Stopped (05/05/24 1242)        Assessment & Plan         Cardiac arrest    Type 2 diabetes mellitus with hyperglycemia    Hyperlipidemia LDL goal <70    Hypothyroidism (acquired)    Coronary artery disease involving native coronary artery of native heart with angina pectoris    Pacemaker    HFrEF (heart failure with reduced ejection fraction)    Anemia, chronic disease    Stage 3b chronic kidney disease    UTI (urinary tract infection)    Acute respiratory failure with hypoxia    Elevated troponin level not due myocardial infarction    79 y.o. with history of T2DM, PAF, SSS status post PPM placement, CHF, PVD, left diabetic foot infection, bilateral DVTs, on Eliquis, who presented to [facility] on 4/22/2024 with weakness and chronic right lower extremity wound.  UA was consistent with UTI.  He was started on Rocephin and  admitted to the hospitalist service.    On 4/25/2024, he suffered a V-fib arrest and was intubated during CPR.  He was transferred to the intensive care unit.  He was able to be extubated on 4/26/2024.  After extubation, he had a tenuous respiratory status and family made him a DNR/DNI.  Patient was seen by infectious disease who managed antibiotics.  Patient has had ischemic hepatitis which has been improving.  He has required low-dose norepinephrine and midodrine.  LVEF 21 to 25%.    Plan:  1.  For hypotension: Multifactorial, cardiac with possible adrenal insufficiency.  Initiated on stress dose steroids with hydrocortisone and fludrocortisone on 5/4/2024.  I will go ahead and stop fludrocortisone and decrease hydrocortisone to 50 mg IV every 8 hours.  Continue midodrine.  Okay to telemetry/hospitalist at this point.  2.  For T2DM: Insulin per Glucomander.  3.  For hypothyroidism: Levothyroxine.  4.  For PAF/history of DVT: Eliquis.  5.  For BPH: Tamsulosin.  6.  Okay to telemetry/hospitalist.  7.  Goals of care: Hospice referral made with patient/family meeting with hospice planned for today.  8.  For congestive heart failure: LVEF 21 to 25%.  Agree with diuresis as ordered by nephrology      MDM:    Problem(s) High due to: Acute or Chronic illness or injury that may poses a threat to life or bodily function  Data: High due to: Review of the result(s) of each unique test and Independent interpretation of a test performed by another physician/NPP (not separately reported)  Risk: Low risk of morbidity from additional diagnostic testing or treatment    High    Electronically signed by:    Fran Flores MD  05/06/24  12:26 EDT      *. Please note that portions of this note were completed with [employer] - a voice recognition program.     Electronically signed by Fran Flores MD at 05/06/24 1242       Jose Ramon Murphy MD at 05/05/24 1401            Intensive Care Follow-up     Hospital:  LOS: 12 days    Mr. Jermaine Singh, 79 y.o. male is followed for:   Cardiac arrest     Subjective       Subjective     Interval History:    Doing well.    Still in minimal dose of Norepinephrine this morning.    Temp  Min: 99 °F (37.2 °C)  Max: 100.2 °F (37.9 °C)      The patient's past medical, surgical and social history were reviewed and updated in Epic as appropriate.      History     Last Reviewed by Jose Ramon Murphy MD on 2024 at  3:14 PM    Sections Reviewed    Medical, Surgical, Family, Tobacco, Alcohol, Drug Use, Sexual Activity,   Social Documentation    Problem list reviewed by Jose Ramon Murphy MD on 2024 at  3:14 PM  Medicines reviewed by Jose Ramon Murphy MD on 2024 at  3:14 PM  Allergies reviewed by Jose Ramon Murphy MD on 2024 at  3:14 PM     Objective   Objective     Vitals:  Temp: 100.2 °F (37.9 °C) (24 1200) Temp  Min: 99 °F (37.2 °C)  Max: 100.2 °F (37.9 °C)   Temp core:      BP: 104/65 (24 1230) BP  Min: 82/64  Max: 106/68   MAP (non-invasive) Noninvasive MAP (mmHg): 79 (24 1230) Noninvasive MAP (mmHg)  Av.9  Min: 47  Max: 137   Pulse: 66 (24 1230) Pulse  Min: 66  Max: 92   Resp: 18 (24 1200) Resp  Min: 14  Max: 20   SpO2: 97 % (24 1230) SpO2  Min: 92 %  Max: 100 %   Device: room air (24 0800)    Flow Rate: 1 (24 0400) No data recorded         24  0529 24  05   Weight: 88.6 kg (195 lb 5.2 oz) 87.1 kg (192 lb 0.3 oz)        Intake/Ouptut 24 hrs (7:00AM - 6:59 AM)  Intake & Output (last 2 days)          07 0700  0701   0700  0701   0700    P.O. 480  360    I.V. (mL/kg) 81.1 (0.9) 257.1 (3)     IV Piggyback  100     Total Intake(mL/kg) 561.1 (6.3) 357.1 (4.1) 360 (4.1)    Urine (mL/kg/hr) 2605 (1.2) 1885 (0.9) 200 (0.3)    Stool 0 0 0    Total Output 2605 1885 200    Net -2043.9 -1527.9 +160           Stool Unmeasured Occurrence 3 x 4 x 1 x            Medications (drips):  norepinephrine, Last Rate:  Stopped (05/05/24 1242)         Physical Examination  Telemetry:  Rhythm: paced rhythm (05/05/24 0800)      Constitutional:  No acute distress.   Cardiovascular: RRR.    Respiratory: Normal breath sounds  No adventitious sounds   Abdominal:  Soft with no tenderness.   Extremities: 1+ Edema   Neurological:   Alert, Oriented, Cooperative.    Best Eye Response: 3-->(E3) to speech (05/05/24 0800)  Best Motor Response: 6-->(M6) obeys commands (05/05/24 0800)  Best Verbal Response: 4-->(V4) confused (05/05/24 0800)  Binta Coma Scale Score: 13 (05/05/24 0800)    RASS (Michelle Agitation-Sedation Scale): -1-->drowsy (05/05/24 0600)    Confusion Assessment Method-ICU (CAM-ICU)  Feature 3: Altered Level of Consciousness: Positive (05/05/24 0600)     Results Reviewed:  Laboratory  Microbiology  Radiology  Pathology    Hematology:  Results from last 7 days   Lab Units 05/05/24 0523 05/04/24 0528 05/03/24 0441   WBC 10*3/mm3 6.61 9.42 10.37   HEMOGLOBIN g/dL 9.0* 9.2* 8.9*   MCV fL 91.7 89.2 90.7   PLATELETS 10*3/mm3 187 238 251     Results from last 7 days   Lab Units 05/05/24 0523 05/04/24 0528 05/03/24 0441   NEUTROS ABS 10*3/mm3 5.83 6.93 7.72*   LYMPHS ABS 10*3/mm3 0.53* 1.03 1.20   EOS ABS 10*3/mm3 0.00 0.30 0.35     Chemistry:  Estimated Creatinine Clearance: 26.3 mL/min (A) (by C-G formula based on SCr of 2.81 mg/dL (H)).    Results from last 7 days   Lab Units 05/05/24 0523 05/04/24 0528   SODIUM mmol/L 142 142   POTASSIUM mmol/L 4.1 4.0   CHLORIDE mmol/L 109* 110*   CO2 mmol/L 18.0* 20.0*   BUN mg/dL 71* 72*   CREATININE mg/dL 2.81* 2.95*   GLUCOSE mg/dL 159* 104*     Results from last 7 days   Lab Units 05/05/24 0523 05/04/24  0528 05/03/24  0441 05/02/24  0433   CALCIUM mg/dL 8.2* 8.0*   < > 7.8*   MAGNESIUM mg/dL  --  2.5*  --  2.8*   PHOSPHORUS mg/dL  --  4.5  --  4.9*    < > = values in this interval not displayed.     Hepatic Panel:  Results from last 7 days   Lab Units 05/05/24  0523 05/04/24  0528  05/03/24  0441   ALBUMIN g/dL 3.3* 2.8* 2.8*   TOTAL PROTEIN g/dL 5.8* 5.2* 5.3*   BILIRUBIN mg/dL 0.5 0.4 0.4   AST (SGOT) U/L 11 12 13   ALT (SGPT) U/L 65* 84* 106*   ALK PHOS U/L 75 77 74     Images:  No radiology results for the last day    Echo:  Results for orders placed during the hospital encounter of 04/22/24    Adult Transthoracic Echo Complete W/ Cont if Necessary Per Protocol    Interpretation Summary    Left ventricular ejection fraction appears to be 21 - 25%.    The left atrial cavity is mildly dilated.    The right atrial cavity is mildly  dilated.    Estimated right ventricular systolic pressure from tricuspid regurgitation is normal (<35 mmHg). Calculated right ventricular systolic pressure from tricuspid regurgitation is 22 mmHg.    Akinetic anterior wall    MAC      Results: Reviewed.  I reviewed the patient's new laboratory and imaging results.  I independently reviewed the patient's new images.    Medications: Reviewed.        Assessment & Plan   Impression      Active Hospital Problems    Diagnosis  POA    **Cardiac arrest [I46.9]  Yes     Cardiac arrest requiring approximately 15 defibrillations, 4/25/2024  Echo (4/25/2024): LVEF <20%.  Dilated LV.      Acute respiratory failure with hypoxia [J96.01]  Yes    Elevated troponin level not due myocardial infarction [R79.89]  Yes    UTI (urinary tract infection) [N39.0]  Yes    Anemia, chronic disease [D63.8]  Yes    Stage 3b chronic kidney disease [N18.32]  Yes    HFrEF (heart failure with reduced ejection fraction) [I50.20]  Yes     Echo (8/11/2022): EF 48%.  No significant valvular abnormality  Echo (4/25/2024): LVEF <20%.  Dilated LV.      Pacemaker [Z95.0]  Yes     Status post [facility] pacemaker placement by Dr. Apodaca 5/18/2022      Type 2 diabetes mellitus with hyperglycemia [E11.65]  Yes    Hyperlipidemia LDL goal <70 [E78.5]  Yes    Hypothyroidism (acquired) [E03.9]  Yes    Coronary artery disease involving native coronary artery of  native heart with angina pectoris [I25.119]  Yes     Cardiac catheterization (2019): Severe multivessel disease with severely diffusely diseased LAD  CABG x3 by Pawan Au (3/2019): LIMA to LAD, SVG to OM, SVG to distal RCA  Nuclear stress (10/2022): No ischemia       Lab Results   Lab Value Date/Time    HGBA1C 8.50 (H) 04/23/2024 0550    HGBA1C 8.70 (H) 07/23/2023 2235     Results from last 7 days   Lab Units 05/05/24  1133 05/05/24  0803 05/04/24 2023 05/04/24  1640 05/04/24  1117 05/04/24  0716 05/03/24  1936 05/03/24  1716   GLUCOSE mg/dL 261* 166* 272* 193* 127 105 132* 124       Jermaine Singh is a 79 y.o. male  with a history of  who presented to [facility] on 4/22/24 with weakness and a chronic wound on his RLE. UA was consistent with a UTI. He was started on Rocephin and admitted to Hospital Medicine.      On 4/25/24, he suffered a V Fib arrest and was intubated during CPR. He was transferred to the ICU. He was able to be extubated on 4/26/24. After extubation, his respiratory status was tenuous and family decided to make him a DNR/DNI.  Patient was seen by infectious disease and on antibiotics.  Also has been requiring norepinephrine at low-dose.  Midodrine has been started. Patient also developed ischemic hepatitis which seems to be recovering.    Repeat echocardiogram showed ejection fraction remains at 21 to 25%.  Patient also  remains hemodynamically unstable and unable to be weaned off norepinephrine.    Comorbidities: T2DM, Stage 3 CKD, bilateral DVTs, paroxysmal Afib, SSS s/p post-pacemaker placement, CHF, PVD, and left diabetic foot infection    Cortisol level 05/04/24  9. R/O CIRCI. Start stress doses of mineralocorticoids (Hydrocortisone and Fludrocortisone) started on 05/04/24  Wean Norepi off.  Goal: Glucose < 180 mg/dL.   Continue Levothyroxine TSH 1.94 this admission  Anticoagulation with APIxaban  Discussed with Dr. Tilley  Goal: Glucose < 180 mg/dL.  Hyperglycemia worse with steroids.  Adjust  "doses of Insulin SQ.  Disposition: Keep in ICU       MDM:    Problem(s) High due to: Acute or Chronic illness or injury that may poses a threat to life or bodily function  Data: High due to: Review of the result(s) of each unique test, Ordering of each unique test, and Discuss management or test interpretation with external physician or NPP (not separately reported)    High                Electronically signed by Jose Ramon Murphy MD at 05/05/24 1412       Blake Tilley MD at 05/05/24 1145           LOS: 12 days   Patient Care Team:  Marysol Shrestha MD as PCP - General (Internal Medicine)  Cooper Apodaca MD as Consulting Physician (Cardiology)    Chief Complaint: [place] on CKD stage III  Cardiorenal syndrome.     Subjective     Good response to diuretics. UOP ~ 1.8 liter. Net fluid balance ~ -1.5 liter. Some improvement in renal function noted. Otherwise clinical status is same.Appetite is poor. On low dose levophed. Now started on hydrocortisone and florinef for relative AI    Subjective:  Symptoms:  Stable.  No shortness of breath, chest pain or chest pressure.    Diet:  Adequate intake.  No nausea or vomiting.        History taken from: patient    Objective     Vital Sign Min/Max for last 24 hours  Temp  Min: 98.1 °F (36.7 °C)  Max: 99.9 °F (37.7 °C)   BP  Min: 82/64  Max: 106/68   Pulse  Min: 66  Max: 92   Resp  Min: 14  Max: 20   SpO2  Min: 92 %  Max: 100 %   No data recorded   Weight  Min: 87.1 kg (192 lb 0.3 oz)  Max: 87.1 kg (192 lb 0.3 oz)     Flowsheet Rows      Flowsheet Row First Filed Value   Admission Height 190.5 cm (75\") Documented at 04/22/2024 1438   Admission Weight 77.1 kg (170 lb) Documented at 04/22/2024 1438            No intake/output data recorded.  I/O last 3 completed shifts:  In: 404.2 [I.V.:304.2; IV Piggyback:100]  Out: 3390 [Urine:3390]    Objective:  General Appearance:  Comfortable.    Vital signs: (most recent): Blood pressure 98/65, pulse 70, temperature 99.3 °F (37.4 °C), " "temperature source Oral, resp. rate 20, height 190 cm (74.8\"), weight 87.1 kg (192 lb 0.3 oz), SpO2 96%.  Vital signs are normal.  No fever.    Output: Producing urine.    HEENT: Normal HEENT exam.    Lungs:  Normal effort and normal respiratory rate.  Breath sounds clear to auscultation.  He is not in respiratory distress.  No decreased breath sounds or wheezes.    Heart: Normal rate.  Regular rhythm.  S1 normal and S2 normal.  No gallop.   Abdomen: Abdomen is soft.  Bowel sounds are normal.     Extremities: There is no dependent edema or local swelling.    Pulses: Distal pulses are intact.    Neurological: Patient is alert and oriented to person, place and time.  Normal strength.    Pupils:  Pupils are equal, round, and reactive to light.    Skin:  Pale.                Results Review:     I reviewed the patient's new clinical results.    WBC WBC   Date Value Ref Range Status   05/05/2024 6.61 3.40 - 10.80 10*3/mm3 Final   05/04/2024 9.42 3.40 - 10.80 10*3/mm3 Final   05/03/2024 10.37 3.40 - 10.80 10*3/mm3 Final      HGB Hemoglobin   Date Value Ref Range Status   05/05/2024 9.0 (L) 13.0 - 17.7 g/dL Final   05/04/2024 9.2 (L) 13.0 - 17.7 g/dL Final   05/03/2024 8.9 (L) 13.0 - 17.7 g/dL Final      HCT Hematocrit   Date Value Ref Range Status   05/05/2024 27.6 (L) 37.5 - 51.0 % Final   05/04/2024 28.1 (L) 37.5 - 51.0 % Final   05/03/2024 27.3 (L) 37.5 - 51.0 % Final      Platlets No results found for: \"LABPLAT\"   MCV MCV   Date Value Ref Range Status   05/05/2024 91.7 79.0 - 97.0 fL Final   05/04/2024 89.2 79.0 - 97.0 fL Final   05/03/2024 90.7 79.0 - 97.0 fL Final          Sodium Sodium   Date Value Ref Range Status   05/05/2024 142 136 - 145 mmol/L Final   05/04/2024 142 136 - 145 mmol/L Final   05/03/2024 140 136 - 145 mmol/L Final      Potassium Potassium   Date Value Ref Range Status   05/05/2024 4.1 3.5 - 5.2 mmol/L Final     Comment:     Slight hemolysis detected by analyzer. Result may be falsely elevated. " "  05/04/2024 4.0 3.5 - 5.2 mmol/L Final   05/03/2024 4.2 3.5 - 5.2 mmol/L Final      Chloride Chloride   Date Value Ref Range Status   05/05/2024 109 (H) 98 - 107 mmol/L Final   05/04/2024 110 (H) 98 - 107 mmol/L Final   05/03/2024 109 (H) 98 - 107 mmol/L Final      CO2 CO2   Date Value Ref Range Status   05/05/2024 18.0 (L) 22.0 - 29.0 mmol/L Final   05/04/2024 20.0 (L) 22.0 - 29.0 mmol/L Final   05/03/2024 18.0 (L) 22.0 - 29.0 mmol/L Final      BUN BUN   Date Value Ref Range Status   05/05/2024 71 (H) 8 - 23 mg/dL Final   05/04/2024 72 (H) 8 - 23 mg/dL Final   05/03/2024 79 (H) 8 - 23 mg/dL Final      Creatinine Creatinine   Date Value Ref Range Status   05/05/2024 2.81 (H) 0.76 - 1.27 mg/dL Final   05/04/2024 2.95 (H) 0.76 - 1.27 mg/dL Final   05/03/2024 3.38 (H) 0.76 - 1.27 mg/dL Final      Calcium Calcium   Date Value Ref Range Status   05/05/2024 8.2 (L) 8.6 - 10.5 mg/dL Final   05/04/2024 8.0 (L) 8.6 - 10.5 mg/dL Final   05/03/2024 7.9 (L) 8.6 - 10.5 mg/dL Final      PO4 No results found for: \"CAPO4\"   Albumin Albumin   Date Value Ref Range Status   05/05/2024 3.3 (L) 3.5 - 5.2 g/dL Final   05/04/2024 2.8 (L) 3.5 - 5.2 g/dL Final   05/03/2024 2.8 (L) 3.5 - 5.2 g/dL Final      Magnesium Magnesium   Date Value Ref Range Status   05/04/2024 2.5 (H) 1.6 - 2.4 mg/dL Final      Uric Acid No results found for: \"URICACID\"     Medication Review: Yes    Assessment & Plan       Cardiac arrest    Type 2 diabetes mellitus with hyperglycemia    Hyperlipidemia LDL goal <70    Hypothyroidism (acquired)    Coronary artery disease involving native coronary artery of native heart with angina pectoris    Pacemaker    HFrEF (heart failure with reduced ejection fraction)    Anemia, chronic disease    Stage 3b chronic kidney disease    UTI (urinary tract infection)    Acute respiratory failure with hypoxia    Elevated troponin level not due myocardial infarction      Assessment & Plan      [place] on CKD stage IIIb/IV:  Baseline cr " 1.8-2.2 mg/dl. Recent cr trend 2.2>2.37>2.5>2.63>3.3. Etiology of [place] likely hemodynamic injury post vfib arrest vs decompensated cardiorenal syndrome. Taken off Zosyn due to concern for AIN. Urine eos 0%.         CKD stage III/IV: GFR baseline 28-32ml/min. Follows with Dr Pal. Risk factor for CKD: DM, HTN, PVD and CAD. Serological workup negative in the past. Including ANCA, antiGBM     Met acidosis: Due to [place] on CKD. Monitor for now. Avoiding bicarb supp due to sodium loading.      Cardiogenic shock: Requiring pressor support. ECHO post arrest showed EF 20%. Hx of severe LV disease. Cardiology following. Repeat ECHO shows EF 21-25%. Requiring pressor support to optimize blood pressure. Random cortisol 9. On steroids.     Volume status: On RA. Does have dependent edema LE b/l. Improved with diuretics.      Hx of recurrent UTI: Recently treated for fungal UTI. Urology and ID following      Hx of HTN: Renal artery duplex negative for WILMA in the past.     Anemia: ACD. Monitor for now. Ferritin 309. Fe sat 9%     Recs  [place] on CKD stage IIIb/IV due to above mentioned risk factor. Repeat ECHO showed EF 21-25%. Given severe LAD disease and low EF patient is not a good candidate for long term dialysis. I discussed with patient continued medical management and optimization of blood pressure and volume status for now.   - Continue lasix 80mg IV daily.   - On midodrine 10mg TID in attempts to wean off pressor support.   - Monitor fluid retention on fludrocortisone  - Unable to add GDMT for CHF. Not a candidate for ACEi/ARB/SGLT-2inh  - Target MAP 65 or above  - Strict I/o  - Dose meds to eGFR   - Hospice following to discuss GOC.           I discussed the patients findings and my recommendations with patient       Blake Tilley MD  05/05/24  11:45 EDT          Electronically signed by Blake Tilley MD at 05/05/24 3211       Jf Padilla MD at 05/05/24 1146              INFECTIOUS DISEASE Progress Note    Jermaine JIMENES  Francisco  1945  [id number]    Admission Date: 4/22/2024      Requesting Provider: No ref. provider found  Evaluating Physician: Jf Padilla MD    Reason for Consultation:  UTI    History of present illness:    4/23/24: Patient is a 79 y.o. male  with a history of type 2 diabetes mellitus, peripheral neuropathy, stage III chronic kidney disease, bilateral DVTs, paroxysmal atrial fibrillation, sick sinus syndrome status post pacemaker placement, CHF, peripheral vascular disease, and left diabetic foot infection who is seen today for evaluation of UTI.  He had a history of UTI last month with urine cultures and early March growing yeast.  He was apparently treated with an antifungal agent.    Over the last few days he noted the onset of profound weakness and fatigue.  He also noticed some dysuria.  He was brought to the emergency room and found to have pyuria and bacteriuria with leukocytosis.   Urine culture was sent and he was started on ceftriaxone.   His white blood cell count today is 11.8.   He was noted to have a blister over his right fifth metatarsal head and a chronic right pretibial wound.    4/24/24: He remains afebrile.  His urine culture was finalized as negative.  I have asked the laboratory to hold the urine culture for yeast but they did not and it was disposed of last night.   He states that his dysuria is improved today.  Feels better.     4/25/2024:  He remains afebrile.  White blood cell count today is 6.7.  Creatinine is 1.99.   Urinalysis yesterday revealed too numerous to count white blood cells.  He denied dysuria this morning.  After I saw him he suffered a V-fib cardiac arrest and was transferred to the ICU.  He is endotracheally intubated    4/26/24:   His maximum temperature over the last 24 hours is 99.5.  Repeat urine culture from 4/24 is pending.  He remains on ventilatory support.    Creatinine is 2.28.  ALT is  1028.  White blood cell count is 9.95.  O2 saturation is 100% on an  FiO2 of 30%.  Chest x-ray reveals no pulmonary infiltrates. \    Later this morning he was extubated.  He is confused.  He knows his name and his birthdate.  He cannot answer more complex questions.    4/27/2024:  His maximum temperature over the last 24 hours is 99.5.  His left ventricular ejection fraction is less than 20%.  His white blood cell count is 2.37.  ALT is down to 626.   Chest x-ray 4/27 reveals moderate hemoglobin airspace disease.   O2 saturation is 99% on 4 L.   He is on Zosyn therapy.   Later today his oxygen demand increased and he was placed   On BiPAP.     4/28/2024:   He has remained afebrile.   White blood cell count is 10.1. His creatinine is 2.65 ALT is 450.  His oxygen demand is down to 1 liter today. Chest x-ray today reveals interval improvement in the right upper lobe infiltrate.  He is coughing and has some sputum production.  He remains confused and did not recognize his wife and daughter today      4/29/2024: He remains afebrile.  His creatinine is 2.52.  ALT is 320.  White blood cell count is 9.3.    He is now on 1 L with an O2 saturation of 96%.  He is more alert and responsive today.  He is less confused.     4/30/2024: He has remained afebrile.  Creatinine is 2.68.  ALT is 218.   White blood cell count is 9.1.  room air. room air.  His confusion is improved today.  He recognized his wife today.  He denies increased cough and sputum production.  He knows me by name today.    5/1/2024:  He remains afebrile.  His creatinine today is 3.39.   White blood cell count is 8.9. O2 saturation is 97% on room air.      5/2/2024:  He remains afebrile.  His creatinine today is 3.3.  White blood cell count is 10.7.   Urine eosinophil smear was 0. He has continued to require vasopressor therapy.   Echocardiogram from yesterday reveals a 21-25% ejection fraction.   He feels better today.  His daughter indicates that  is cognitive function has improved and he is no longer confused.    5/3/2024:   Maximum temperature over the last 24 hours is 99.4.  Creatinine is 3.38.    ALT is 106.   white blood cell count is 10.4.  O2 saturation is 94% on room air.  He denies increased dyspnea.  He is still requiring low-dose vasopressor support.    5/4/24: History reviewed.  I am following the patient for Dr. Lincoln Padilla over the weekend. The patient denies any worsening shortness of breath.  Breathing is nonlabored on room air.  He has not had any fevers.  He is now off of antibiotics since 5/1.  Creatinine was downtrending to 2.95, from 3.38.  White blood count is normal at 9.42.  He denies any nausea, vomiting, or diarrhea.  He denies peripheral edema.  Right lower extremity with Unna boot in place.    5/5/24: Patient had a temperature up to 100.2°F today.  No shaking chills.  Fonseca catheter remains in place.  No abdominal pain.  No worsening shortness of breath or productive cough.  No new rashes reported.  No increased leg pain.  He denies any diarrhea.    Past Medical History:   Diagnosis Date    Allergic     Arthritis     Asthma     Coronary artery disease     Diabetes mellitus 2000    started on inuslin 12/2018; started on po meds in 2000; checking blood sugars daily     Diabetic foot infection 07/23/2023    Disease of thyroid gland     po meds daily for hypothyroidism     Elevated serum creatinine 11/15/2022    Elevated troponin 10/02/2022    Generalized weakness 11/15/2022    History of fracture as a child     rt leg- severe     Hyperlipidemia     Hypertension     Hypothyroidism     PAF (paroxysmal atrial fibrillation) 07/23/2023    Peripheral neuropathy     Peripheral vascular disease     s/p angiogram 2/19-needs stent in left leg     Pleural effusion, bilateral 11/15/2022    Proximal phalanx fracture of the second digit extending into the second metatarsal joint 07/28/2022    RBBB     Right knee pain     Status post amputation of great toe and second toe of left foot 07/23/2023    Status post  amputation of great toe, left 11/15/2022    Unstable angina 02/12/2019    Added automatically from request for surgery [id number]    Vitamin D deficiency        Past Surgical History:   Procedure Laterality Date    AMPUTATION DIGIT Left 01/17/2023    Procedure: AMPUTATION DIGIT LEFT;  Surgeon: Gopal Márquez MD;  Location:  [facility] OR;  Service: Vascular;  Laterality: Left;    APPENDECTOMY      CARDIAC CATHETERIZATION N/A 02/14/2019    Procedure: Left Heart Cath;  Surgeon: Cooper Apodaca MD;  Location: [facility] CATH INVASIVE LOCATION;  Service: Cardiology    CARDIAC ELECTROPHYSIOLOGY PROCEDURE N/A 05/18/2022    Procedure: DEVICE IMPLANT;  Surgeon: Cooper Apodaca MD;  Location: [facility] CATH INVASIVE LOCATION;  Service: Cardiology;  Laterality: N/A;    CARDIAC SURGERY      COLONOSCOPY      CORONARY ARTERY BYPASS GRAFT N/A 03/22/2019    Procedure: MEDIAN STERNOTOMY, CORONARY ARTERY BYPASS GRAFT X3, UTILIZING THE LEFT INTERNAL MAMMARY ARTERY, EVH AND OPEN HARVEST OF THE RIGHT GREATER SAPHENOUS VEIN, EXPLORATION OF THE LEFT LEG;  Surgeon: Ap Au MD;  Location: [facility] OR;  Service: Cardiothoracic    EYE SURGERY Bilateral     cataracts     FRACTURE SURGERY      INTERVENTIONAL RADIOLOGY PROCEDURE N/A 07/29/2021    Procedure: Abdominal Aortagram with Runoff;  Surgeon: Jermaine Hernandez MD;  Location: [facility] CATH INVASIVE LOCATION;  Service: Cardiovascular;  Laterality: N/A;    KNEE ARTHROSCOPY      right x 2, left x 1    LACERATION REPAIR      right leg    LEG SURGERY      2 for fracture of rt leg     TONSILLECTOMY      Adnoidectomy    TRANS METATARSAL AMPUTATION Left 08/02/2022    Procedure: GREAT TOE AMPUTATION LEFT;  Surgeon: Gopal Márquez MD;  Location: [facility] OR;  Service: Vascular;  Laterality: Left;       Family History   Problem Relation Age of Onset    Coronary artery disease Mother     Diabetes Mother     Hyperlipidemia Mother     Cancer Father        Social History     Socioeconomic History     Marital status:     Number of children: 2   Tobacco Use    Smoking status: Never     Passive exposure: Past    Smokeless tobacco: Never   Vaping Use    Vaping status: Never Used   Substance and Sexual Activity    Alcohol use: Not Currently     Comment: every 2-3 months    Drug use: No    Sexual activity: Not Currently     Partners: Female       Allergies   Allergen Reactions    Vancomycin Other (See Comments)     Kidney failure per last admission         Medication:    Current Facility-Administered Medications:     acetaminophen (TYLENOL) tablet 650 mg, 650 mg, Oral, Q6H PRN, Maricel Peraza, APRN, 650 mg at 05/05/24 0546    albuterol (PROVENTIL) nebulizer solution 0.083% 2.5 mg/3mL, 2.5 mg, Nebulization, BID PRN, Nathalia Mckeon DNP, APRN, 2.5 mg at 04/30/24 0755    apixaban (ELIQUIS) tablet 2.5 mg, 2.5 mg, Oral, Q12H, Antonio Angeles MD, 2.5 mg at 05/05/24 2146    aspirin chewable tablet 81 mg, 81 mg, Oral, Daily, Maricel Peraza, APRN, 81 mg at 05/05/24 1011    atorvastatin (LIPITOR) tablet 20 mg, 20 mg, Oral, Nightly, Golden Madison MD, 20 mg at 05/05/24 2146    castor oil-balsam peru (VENELEX) ointment 1 Application, 1 Application, Topical, Daily, Eyad Ledesma MD, 1 Application at 05/04/24 0818    dextrose (D50W) (25 g/50 mL) IV injection 10-50 mL, 10-50 mL, Intravenous, Q15 Min PRN, Jose Ramon Murphy MD    dextrose (GLUTOSE) oral gel 15 g, 15 g, Oral, Q15 Min PRN, Jose Ramon Murphy MD    fludrocortisone tablet 100 mcg, 100 mcg, Oral, Daily, Jose Ramon Murphy MD, 100 mcg at 05/05/24 1011    furosemide (LASIX) injection 80 mg, 80 mg, Intravenous, Daily, Blake Tilley MD, 80 mg at 05/05/24 1007    glucagon (GLUCAGEN) injection 1 mg, 1 mg, Intramuscular, Q15 Min PRN, Jose Ramon Murphy MD    HYDROcodone-acetaminophen (NORCO) 5-325 MG per tablet 1 tablet, 1 tablet, Oral, Q4H PRN, Antonio Angeles MD, 1 tablet at 05/05/24 1011    [COMPLETED] Hydrocortisone Sod Suc (PF) (Solu-CORTEF)  injection 100 mg, 100 mg, Intravenous, Once, 100 mg at 05/04/24 0951 **FOLLOWED BY** Hydrocortisone Sod Suc (PF) (Solu-CORTEF) injection 50 mg, 50 mg, Intravenous, Q6H, Jose Ramon Murphy MD, 50 mg at 05/05/24 2146    insulin glargine (LANTUS, SEMGLEE) injection 1-200 Units, 1-200 Units, Subcutaneous, Nightly - Glucommander, Jose Ramon Murphy MD, 13 Units at 05/05/24 2146    insulin lispro (humaLOG) injection 1-200 Units, 1-200 Units, Subcutaneous, 4x Daily With Meals & Nightly, Jose Ramon Murphy MD, 3 Units at 05/05/24 2146    insulin lispro (humaLOG) injection 1-200 Units, 1-200 Units, Subcutaneous, PRN, Jose Ramon Murphy MD, 4 Units at 05/05/24 1751    levothyroxine (SYNTHROID, LEVOTHROID) tablet 88 mcg, 88 mcg, Oral, Q AM, Maricel Peraza APRN, 88 mcg at 05/05/24 0523    Magnesium Low Dose Replacement - Follow Nurse / BPA Driven Protocol, , Does not apply, PRN, Maricel Peraza APRN    midodrine (PROAMATINE) tablet 10 mg, 10 mg, Oral, TID AC, oGlden Madison MD, 10 mg at 05/05/24 2146    nitroglycerin (NITROSTAT) SL tablet 0.4 mg, 0.4 mg, Sublingual, Q5 Min PRN, Maricel Peraza APRN    norepinephrine (LEVOPHED) 8 mg in 250 mL NS infusion (premix), 0.02-0.3 mcg/kg/min, Intravenous, Titrated, Eyad Ledesma MD, Stopped at 05/05/24 1242    pantoprazole (PROTONIX) EC tablet 40 mg, 40 mg, Oral, Q AM, Antonio Angeles MD, 40 mg at 05/05/24 0523    polyethylene glycol (MIRALAX) packet 17 g, 17 g, Oral, Daily, WestArtur MD, 17 g at 05/02/24 1037    prochlorperazine (COMPAZINE) injection 5 mg, 5 mg, Intravenous, Q6H PRN, Maricel Peraza, APRN, 2.5 mg at 04/26/24 1316    sennosides-docusate (PERICOLACE) 8.6-50 MG per tablet 2 tablet, 2 tablet, Oral, BID, 2 tablet at 05/02/24 2107 **AND** [DISCONTINUED] polyethylene glycol (MIRALAX) packet 17 g, 17 g, Oral, Daily PRN, 17 g at 04/30/24 2038 **AND** [DISCONTINUED] bisacodyl (DULCOLAX) EC tablet 5 mg, 5 mg, Oral, Daily PRN, 5 mg at 05/01/24  0956 **AND** [DISCONTINUED] bisacodyl (DULCOLAX) suppository 10 mg, 10 mg, Rectal, Daily PRN, Antonio Angeles MD    sodium chloride 0.9 % flush 10 mL, 10 mL, Intravenous, PRN, Case, Hailee V., DO    sodium chloride 0.9 % flush 10 mL, 10 mL, Intravenous, Q12H, Case, Hailee V., DO, 10 mL at 24 2147    tamsulosin (FLOMAX) 24 hr capsule 0.4 mg, 0.4 mg, Oral, Daily, Maricel Peraza, APRN, 0.4 mg at 24 1011    Antibiotics:  Anti-Infectives (From admission, onward)      Ordered     Dose/Rate Route Frequency Start Stop    24 1356  piperacillin-tazobactam (ZOSYN) 3.375 g IVPB in 100 mL NS MBP (CD)        Ordering Provider: Eyad Ledesma MD    3.375 g  over 30 Minutes Intravenous Once 24 1445 24 1557              Review of Systems:  See HPI      Physical Exam:   Vital Signs  Temp (24hrs), Av.2 °F (37.3 °C), Min:98 °F (36.7 °C), Max:100.2 °F (37.9 °C)    Temp  Min: 98 °F (36.7 °C)  Max: 100.2 °F (37.9 °C)  BP  Min: 85/57  Max: 104/66  Pulse  Min: 61  Max: 92  Resp  Min: 14  Max: 20  SpO2  Min: 92 %  Max: 100 %    GENERAL:   Alert and responsive. Sitting up in bed  HEENT: Normocephalic, atraumatic.  No external oral lesions noted  NECK: Supple   HEART:  irregular rate, No murmur   LUNGS:   Diminished in the lung bases.  Nonlabored breathing on room air  ABDOMEN: Soft, nontender, nondistended. No rebound or guarding. NO mass or HSM.  EXT: Right lower extremity with Unna boot in place.  No peripheral edema or cellulitic change noted over his left lower extremity.  :   Fonseca catheter in place. Yellow urine collection container  MSK: No joint effusions or erythema  SKIN: No generalized rashes noted.  No peripheral stigmata of infective endocarditis noted.  NEURO:  Awake alert and oriented.  Normal speech and cognition.    Right upper extremity PICC line site is without any erythema    Laboratory Data    Results from last 7 days   Lab Units 24  0524  0598  "05/03/24  0441   WBC 10*3/mm3 6.61 9.42 10.37   HEMOGLOBIN g/dL 9.0* 9.2* 8.9*   HEMATOCRIT % 27.6* 28.1* 27.3*   PLATELETS 10*3/mm3 187 238 251     Results from last 7 days   Lab Units 05/05/24  0523   SODIUM mmol/L 142   POTASSIUM mmol/L 4.1   CHLORIDE mmol/L 109*   CO2 mmol/L 18.0*   BUN mg/dL 71*   CREATININE mg/dL 2.81*   GLUCOSE mg/dL 159*   CALCIUM mg/dL 8.2*     Results from last 7 days   Lab Units 05/05/24  0523   ALK PHOS U/L 75   BILIRUBIN mg/dL 0.5   ALT (SGPT) U/L 65*   AST (SGOT) U/L 11                         Estimated Creatinine Clearance: 26.3 mL/min (A) (by C-G formula based on SCr of 2.81 mg/dL (H)).      Microbiology:  No results found for: \"ACANTHNAEG\", \"AFBCX\", \"BPERTUSSISCX\", \"BLOODCX\"  No results found for: \"BCIDPCR\", \"CXREFLEX\", \"CSFCX\", \"CULTURETIS\"  No results found for: \"CULTURES\", \"HSVCX\", \"URCX\"  No results found for: \"EYECULTURE\", \"GCCX\", \"HSVCULTURE\", \"LABHSV\"  No results found for: \"LEGIONELLA\", \"MRSACX\", \"MUMPSCX\", \"MYCOPLASCX\"  No results found for: \"NOCARDIACX\", \"STOOLCX\"  Urine Culture   Date Value Ref Range Status   04/22/2024 No growth  Preliminary     No results found for: \"VIRALCULTU\", \"WOUNDCX\"        Radiology:  Imaging Results (Last 72 Hours)       ** No results found for the last 72 hours. **              Impression:    1.    Right upper lobe aspiration pneumonia-he is S/P Zosyn therapy.  He is clinically and radiographically improved.  Zosyn was discontinued on 5/1   2.  V-fib arrest-status post resuscitation.    3.   Acute kidney injury-superimposed on stage IIIb chronic kidney disease.   4.  Chronic right pretibial dermatitis-without evidence of cellulitis  5.  Type 2 diabetes mellitus  6.  coronary artery disease/status post CABG, 3/22/2019  7.  Severe systolic heart failure- with some improvement on his echocardiogram of 5/1.  8.  Stage IIIb chronic kidney disease  9.  Paroxysmal atrial fibrillation  10.  Acute hypoxic respiratory failure- improved   11.  Ischemic " hepatitis- improved  12.  Pyuria- with a negative urine culture.  13.  Anoxic encephalopathy- improved  14.  Right fifth metatarsal wound/bullous lesion    PLAN/RECOMMENDATIONS:   1.  Continue with a right leg Unna boot  2.  Continue off of antibiotic therapy  3.  Diuretic and vasopressor therapy per  intensivist service.  4.  Patient had a temperature up to 100.2°F.  Could be due to atelectasis.  We will continue to monitor with low threshold to send repeat cultures if he has persistent fevers.  I discussed with nursing staff.    I reviewed the patient's labs and medications today    His overall prognosis is extremely poor due to his severe underlying systolic congestive heart failure.   He may be transitioning to hospice.      I discussed with nursing staff today    Dr. Lincoln Padilla will be back tomorrow    Jf Padilla MD  5/5/2024  22:16 EDT                  Electronically signed by Jf Padilla MD at 05/05/24 6088       Jose Ramon Murphy MD at 05/04/24 4853            Intensive Care Follow-up     Hospital:  LOS: 11 days   Mr. Jermaine Singh, 79 y.o. male is followed for:   Cardiac arrest     Subjective       Subjective     Interval History:    Uneventful night.    Still on low dose Norepi this morning.    On ambient air.    Temp  Min: 98.1 °F (36.7 °C)  Max: 99.1 °F (37.3 °C)      The patient's past medical, surgical and social history were reviewed and updated in Epic as appropriate.      History     Last Reviewed by Jose Ramon Murphy MD on 5/4/2024 at  3:14 PM    Sections Reviewed    Medical, Surgical, Family, Tobacco, Alcohol, Drug Use, Sexual Activity,   Social Documentation    Problem list reviewed by Jose Ramon Murphy MD on 5/4/2024 at  3:14 PM  Medicines reviewed by Jose Ramon Murphy MD on 5/4/2024 at  3:14 PM  Allergies reviewed by Jose Ramon Murphy MD on 5/4/2024 at  3:14 PM     Objective   Objective     Vitals:  Temp: 98.1 °F (36.7 °C) (05/04/24 1200) Temp  Min: 98.1 °F (36.7 °C)  Max: 99.1 °F  (37.3 °C)   Temp core:      BP: 96/65 (24 1400) BP  Min: 86/59  Max: 117/56   MAP (non-invasive) Noninvasive MAP (mmHg): 74 (24 1400) Noninvasive MAP (mmHg)  Av.9  Min: 47  Max: 137   Pulse: 87 (24 1400) Pulse  Min: 66  Max: 87   Resp: 16 (24 1400) Resp  Min: 16  Max: 18   SpO2: 98 % (24 1400) SpO2  Min: 93 %  Max: 99 %   Device: room air (24 1400)    Flow Rate: 1 (24 0400) No data recorded         24  0448 24  0529   Weight: 90.4 kg (199 lb 4.7 oz) 88.6 kg (195 lb 5.2 oz)        Intake/Ouptut 24 hrs (7:00AM - 6:59 AM)  Intake & Output (last 2 days)          07 07 0701   07 0701   0700    P.O. 240 480     I.V. (mL/kg) 130.7 (1.4) 81.1 (0.9)     IV Piggyback   100    Total Intake(mL/kg) 370.7 (4.1) 561.1 (6.3) 100 (1.1)    Urine (mL/kg/hr) 1975 (0.9) 2605 (1.2) 1000 (1.4)    Stool 0 0 0    Total Output  2605 1000    Net -1604.3 -2043.9 -900           Stool Unmeasured Occurrence 1 x 3 x 1 x            Medications (drips):  norepinephrine, Last Rate: 0.02 mcg/kg/min (24 1000)         Physical Examination  Telemetry:  Rhythm: paced rhythm (24 1400)      Constitutional:  No acute distress.   Cardiovascular: RRR.    Respiratory: Normal breath sounds  No adventitious sounds   Abdominal:  Soft with no tenderness.   Extremities: No Edema   Neurological:   Alert, Oriented, Cooperative.    Best Eye Response: 4-->(E4) spontaneous (24)  Best Motor Response: 6-->(M6) obeys commands (05/04/24 1400)  Best Verbal Response: 5-->(V5) oriented (24 1400)  Binta Coma Scale Score: 15 (24 1400)    RASS (Michelle Agitation-Sedation Scale): 0-->alert and calm (24 0600)    Confusion Assessment Method-ICU (CAM-ICU)  Feature 3: Altered Level of Consciousness: Negative (24 0600)     Results Reviewed:  Laboratory  Microbiology  Radiology  Pathology    Hematology:  Results from last 7 days   Lab  Units 05/04/24 0528 05/03/24 0441 05/02/24 0433   WBC 10*3/mm3 9.42 10.37 10.65   HEMOGLOBIN g/dL 9.2* 8.9* 9.0*   MCV fL 89.2 90.7 88.4   PLATELETS 10*3/mm3 238 251 252     Results from last 7 days   Lab Units 05/04/24 0528 05/03/24 0441 05/02/24 0433   NEUTROS ABS 10*3/mm3 6.93 7.72* 7.58*   LYMPHS ABS 10*3/mm3 1.03 1.20 1.27   EOS ABS 10*3/mm3 0.30 0.35 0.49*     Chemistry:  Estimated Creatinine Clearance: 25.4 mL/min (A) (by C-G formula based on SCr of 2.95 mg/dL (H)).    Results from last 7 days   Lab Units 05/04/24 0528 05/03/24 0441   SODIUM mmol/L 142 140   POTASSIUM mmol/L 4.0 4.2   CHLORIDE mmol/L 110* 109*   CO2 mmol/L 20.0* 18.0*   BUN mg/dL 72* 79*   CREATININE mg/dL 2.95* 3.38*   GLUCOSE mg/dL 104* 110*     Results from last 7 days   Lab Units 05/04/24 0528 05/03/24 0441 05/02/24 0433   CALCIUM mg/dL 8.0* 7.9* 7.8*   MAGNESIUM mg/dL 2.5*  --  2.8*   PHOSPHORUS mg/dL 4.5  --  4.9*     Hepatic Panel:  Results from last 7 days   Lab Units 05/04/24 0528 05/03/24 0441 05/02/24  0433   ALBUMIN g/dL 2.8* 2.8* 2.8*   TOTAL PROTEIN g/dL 5.2* 5.3* 5.1*   BILIRUBIN mg/dL 0.4 0.4 0.4   AST (SGOT) U/L 12 13 21   ALT (SGPT) U/L 84* 106* 132*   ALK PHOS U/L 77 74 69     Images:  No radiology results for the last day    Echo:  Results for orders placed during the hospital encounter of 04/22/24    Adult Transthoracic Echo Complete W/ Cont if Necessary Per Protocol    Interpretation Summary    Left ventricular ejection fraction appears to be 21 - 25%.    The left atrial cavity is mildly dilated.    The right atrial cavity is mildly  dilated.    Estimated right ventricular systolic pressure from tricuspid regurgitation is normal (<35 mmHg). Calculated right ventricular systolic pressure from tricuspid regurgitation is 22 mmHg.    Akinetic anterior wall    MAC      Results: Reviewed.  I reviewed the patient's new laboratory and imaging results.  I independently reviewed the patient's new  images.    Medications: Reviewed.        Assessment & Plan   Impression      Active Hospital Problems    Diagnosis  POA    **Cardiac arrest [I46.9]  Yes     Cardiac arrest requiring approximately 15 defibrillations, 4/25/2024  Echo (4/25/2024): LVEF <20%.  Dilated LV.      Acute respiratory failure with hypoxia [J96.01]  Yes    Elevated troponin level not due myocardial infarction [R79.89]  Yes    UTI (urinary tract infection) [N39.0]  Yes    Anemia, chronic disease [D63.8]  Yes    Stage 3b chronic kidney disease [N18.32]  Yes    HFrEF (heart failure with reduced ejection fraction) [I50.20]  Yes     Echo (8/11/2022): EF 48%.  No significant valvular abnormality  Echo (4/25/2024): LVEF <20%.  Dilated LV.      Pacemaker [Z95.0]  Yes     Status post [facility] pacemaker placement by Dr. Apodaca 5/18/2022      Type 2 diabetes mellitus with hyperglycemia [E11.65]  Yes    Hyperlipidemia LDL goal <70 [E78.5]  Yes    Hypothyroidism (acquired) [E03.9]  Yes    Coronary artery disease involving native coronary artery of native heart with angina pectoris [I25.119]  Yes     Cardiac catheterization (2019): Severe multivessel disease with severely diffusely diseased LAD  CABG x3 by Pawan Au (3/2019): LIMA to LAD, SVG to OM, SVG to distal RCA  Nuclear stress (10/2022): No ischemia       Lab Results   Lab Value Date/Time    HGBA1C 8.50 (H) 04/23/2024 0550    HGBA1C 8.70 (H) 07/23/2023 2235     Results from last 7 days   Lab Units 05/04/24  1117 05/04/24  0716 05/03/24  1936 05/03/24  1716 05/03/24  1216 05/03/24  1211 05/03/24  0726 05/02/24 2011   GLUCOSE mg/dL 127 105 132* 124 89 88 123 151*       Jermaine Singh is a 79 y.o. male  with a history of  who presented to [facility] on 4/[facility]/24 with weakness and a chronic wound on his RLE. UA was consistent with a UTI. He was started on Rocephin and admitted to Hospital Medicine.      On 4/25/24, he suffered a V Fib arrest and was intubated during CPR. He was transferred to the ICU. He  was able to be extubated on 4/26/24. After extubation, his respiratory status was tenuous and family decided to make him a DNR/DNI.  Patient was seen by infectious disease and on antibiotics.  Also has been requiring norepinephrine at low-dose.  Midodrine has been started. Patient also developed ischemic hepatitis which seems to be recovering.    Repeat echocardiogram showed ejection fraction remains at 21 to 25%.  Patient also  remains hemodynamically unstable and unable to be weaned off norepinephrine.    Comorbidities: T2DM, Stage 3 CKD, bilateral DVTs, paroxysmal Afib, SSS s/p post-pacemaker placement, CHF, PVD, and left diabetic foot infection    Cortisol level today, only 9. R/O CIRCI. Start stress doses of mineralocorticoids. (Hydrocortisone and Fludrocortisone).  Albumin 25% to improve his intravascular compartment  Off antibiotics at present.  Goal: Glucose < 180 mg/dL.   Continue Levothyroxine TSH 1.94 this admission  Anticoagulation with APIxaban       MDM:    Problem(s) High due to: Acute or Chronic illness or injury that may poses a threat to life or bodily function  Data: Moderate due to: Review of prior external records from each unique source, Review or results of each unique test, and Ordering of each unique test    Moderate                Electronically signed by Jose Rmaon Murphy MD at 05/04/24 1523       Jf Padilla MD at 05/04/24 1415              INFECTIOUS DISEASE Progress Note    Jermaine JIMENES Francisco  1945  [id number]    Admission Date: 4/22/2024      Requesting Provider: No ref. provider found  Evaluating Physician: Jf Padilla MD    Reason for Consultation:  UTI    History of present illness:    4/23/24: Patient is a 79 y.o. male  with a history of type 2 diabetes mellitus, peripheral neuropathy, stage III chronic kidney disease, bilateral DVTs, paroxysmal atrial fibrillation, sick sinus syndrome status post pacemaker placement, CHF, peripheral vascular disease, and left diabetic  foot infection who is seen today for evaluation of UTI.  He had a history of UTI last month with urine cultures and early March growing yeast.  He was apparently treated with an antifungal agent.    Over the last few days he noted the onset of profound weakness and fatigue.  He also noticed some dysuria.  He was brought to the emergency room and found to have pyuria and bacteriuria with leukocytosis.   Urine culture was sent and he was started on ceftriaxone.   His white blood cell count today is 11.8.   He was noted to have a blister over his right fifth metatarsal head and a chronic right pretibial wound.    4/24/24: He remains afebrile.  His urine culture was finalized as negative.  I have asked the laboratory to hold the urine culture for yeast but they did not and it was disposed of last night.   He states that his dysuria is improved today.  Feels better.     4/25/2024:  He remains afebrile.  White blood cell count today is 6.7.  Creatinine is 1.99.   Urinalysis yesterday revealed too numerous to count white blood cells.  He denied dysuria this morning.  After I saw him he suffered a V-fib cardiac arrest and was transferred to the ICU.  He is endotracheally intubated    4/26/24:   His maximum temperature over the last 24 hours is 99.5.  Repeat urine culture from 4/24 is pending.  He remains on ventilatory support.    Creatinine is 2.28.  ALT is  1028.  White blood cell count is 9.95.  O2 saturation is 100% on an FiO2 of 30%.  Chest x-ray reveals no pulmonary infiltrates. \    Later this morning he was extubated.  He is confused.  He knows his name and his birthdate.  He cannot answer more complex questions.    4/27/2024:  His maximum temperature over the last 24 hours is 99.5.  His left ventricular ejection fraction is less than 20%.  His white blood cell count is 2.37.  ALT is down to 626.   Chest x-ray 4/27 reveals moderate hemoglobin airspace disease.   O2 saturation is 99% on 4 L.   He is on Zosyn therapy.    Later today his oxygen demand increased and he was placed   On BiPAP.     4/28/2024:   He has remained afebrile.   White blood cell count is 10.1. His creatinine is 2.65 ALT is 450.  His oxygen demand is down to 1 liter today. Chest x-ray today reveals interval improvement in the right upper lobe infiltrate.  He is coughing and has some sputum production.  He remains confused and did not recognize his wife and daughter today      4/29/2024: He remains afebrile.  His creatinine is 2.52.  ALT is 320.  White blood cell count is 9.3.    He is now on 1 L with an O2 saturation of 96%.  He is more alert and responsive today.  He is less confused.     4/30/2024: He has remained afebrile.  Creatinine is 2.68.  ALT is 218.   White blood cell count is 9.1.  room air. room air.  His confusion is improved today.  He recognized his wife today.  He denies increased cough and sputum production.  He knows me by name today.    5/1/2024:  He remains afebrile.  His creatinine today is 3.39.   White blood cell count is 8.9. O2 saturation is 97% on room air.      5/2/2024:  He remains afebrile.  His creatinine today is 3.3.  White blood cell count is 10.7.   Urine eosinophil smear was 0. He has continued to require vasopressor therapy.   Echocardiogram from yesterday reveals a 21-25% ejection fraction.   He feels better today.  His daughter indicates that  is cognitive function has improved and he is no longer confused.    5/3/2024:  Maximum temperature over the last 24 hours is 99.4.  Creatinine is 3.38.    ALT is 106.   white blood cell count is 10.4.  O2 saturation is 94% on room air.  He denies increased dyspnea.  He is still requiring low-dose vasopressor support.    5/4/24: History reviewed.  I am following the patient for Dr. Lincoln Padilla over the weekend. The patient denies any worsening shortness of breath.  Breathing is nonlabored on room air.  He has not had any fevers.  He is now off of antibiotics since 5/1.  Creatinine  was downtrending to 2.95, from 3.38.  White blood count is normal at 9.42.  He denies any nausea, vomiting, or diarrhea.  He denies peripheral edema.  Right lower extremity with Unna boot in place.    Past Medical History:   Diagnosis Date    Allergic     Arthritis     Asthma     Coronary artery disease     Diabetes mellitus 2000    started on inuslin 12/2018; started on po meds in 2000; checking blood sugars daily     Diabetic foot infection 07/23/2023    Disease of thyroid gland     po meds daily for hypothyroidism     Elevated serum creatinine 11/15/2022    Elevated troponin 10/02/2022    Generalized weakness 11/15/2022    History of fracture as a child     rt leg- severe     Hyperlipidemia     Hypertension     Hypothyroidism     PAF (paroxysmal atrial fibrillation) 07/23/2023    Peripheral neuropathy     Peripheral vascular disease     s/p angiogram 2/19-needs stent in left leg     Pleural effusion, bilateral 11/15/2022    Proximal phalanx fracture of the second digit extending into the second metatarsal joint 07/28/2022    RBBB     Right knee pain     Status post amputation of great toe and second toe of left foot 07/23/2023    Status post amputation of great toe, left 11/15/2022    Unstable angina 02/12/2019    Added automatically from request for surgery [id number]    Vitamin D deficiency        Past Surgical History:   Procedure Laterality Date    AMPUTATION DIGIT Left 01/17/2023    Procedure: AMPUTATION DIGIT LEFT;  Surgeon: Gopal Márquez MD;  Location:  [facility];  Service: Vascular;  Laterality: Left;    APPENDECTOMY      CARDIAC CATHETERIZATION N/A 02/14/2019    Procedure: Left Heart Cath;  Surgeon: Cooper Apodaca MD;  Location:  [email or web address] CATH INVASIVE LOCATION;  Service: Cardiology    CARDIAC ELECTROPHYSIOLOGY PROCEDURE N/A 05/18/2022    Procedure: DEVICE IMPLANT;  Surgeon: Cooper Apodaca MD;  Location: [facility] CATH INVASIVE LOCATION;  Service: Cardiology;  Laterality: N/A;    CARDIAC SURGERY       COLONOSCOPY      CORONARY ARTERY BYPASS GRAFT N/A 03/22/2019    Procedure: MEDIAN STERNOTOMY, CORONARY ARTERY BYPASS GRAFT X3, UTILIZING THE LEFT INTERNAL MAMMARY ARTERY, EVH AND OPEN HARVEST OF THE RIGHT GREATER SAPHENOUS VEIN, EXPLORATION OF THE LEFT LEG;  Surgeon: Ap Au MD;  Location: [facility] OR;  Service: Cardiothoracic    EYE SURGERY Bilateral     cataracts     FRACTURE SURGERY      INTERVENTIONAL RADIOLOGY PROCEDURE N/A 07/29/2021    Procedure: Abdominal Aortagram with Runoff;  Surgeon: Jermaine Hernandez MD;  Location: [facility] CATH INVASIVE LOCATION;  Service: Cardiovascular;  Laterality: N/A;    KNEE ARTHROSCOPY      right x 2, left x 1    LACERATION REPAIR      right leg    LEG SURGERY      2 for fracture of rt leg     TONSILLECTOMY      Adnoidectomy    TRANS METATARSAL AMPUTATION Left 08/02/2022    Procedure: GREAT TOE AMPUTATION LEFT;  Surgeon: Gopal Márquez MD;  Location:  [facility] OR;  Service: Vascular;  Laterality: Left;       Family History   Problem Relation Age of Onset    Coronary artery disease Mother     Diabetes Mother     Hyperlipidemia Mother     Cancer Father        Social History     Socioeconomic History    Marital status:     Number of children: 2   Tobacco Use    Smoking status: Never     Passive exposure: Past    Smokeless tobacco: Never   Vaping Use    Vaping status: Never Used   Substance and Sexual Activity    Alcohol use: Not Currently     Comment: every 2-3 months    Drug use: No    Sexual activity: Not Currently     Partners: Female       Allergies   Allergen Reactions    Vancomycin Other (See Comments)     Kidney failure per last admission         Medication:    Current Facility-Administered Medications:     acetaminophen (TYLENOL) tablet 650 mg, 650 mg, Oral, Q6H PRN, Maricel Peraza, APRN, 650 mg at 05/04/24 0253    albuterol (PROVENTIL) nebulizer solution 0.083% 2.5 mg/3mL, 2.5 mg, Nebulization, BID PRN, Nathalia Mckeon, SOPHIA, APRN, 2.5 mg at  04/30/24 0755    apixaban (ELIQUIS) tablet 2.5 mg, 2.5 mg, Oral, Q12H, Antonio Angeles MD, 2.5 mg at 05/04/24 0818    aspirin chewable tablet 81 mg, 81 mg, Oral, Daily, Maricel Peraza APRN, 81 mg at 05/04/24 0818    atorvastatin (LIPITOR) tablet 20 mg, 20 mg, Oral, Nightly, Golden Madison MD, 20 mg at 05/03/24 2053    castor oil-balsam peru (VENELEX) ointment 1 Application, 1 Application, Topical, Daily, Eyad Ledesma MD, 1 Application at 05/04/24 0818    dextrose (D50W) (25 g/50 mL) IV injection 25 g, 25 g, Intravenous, Q15 Min PRN, Maricel Peraza APRN    fludrocortisone tablet 100 mcg, 100 mcg, Oral, Daily, Jose Ramon Murphy MD, 100 mcg at 05/04/24 1651    furosemide (LASIX) injection 80 mg, 80 mg, Intravenous, Daily, Blake Tilley MD, 80 mg at 05/04/24 0818    glucagon (GLUCAGEN) injection 1 mg, 1 mg, Intramuscular, Q15 Min PRN, Maricel Peraza APRN    HYDROcodone-acetaminophen (NORCO) 5-325 MG per tablet 1 tablet, 1 tablet, Oral, Q4H PRN, Antonio Angeles MD, 1 tablet at 05/03/24 1501    [COMPLETED] Hydrocortisone Sod Suc (PF) (Solu-CORTEF) injection 100 mg, 100 mg, Intravenous, Once, 100 mg at 05/04/24 0951 **FOLLOWED BY** Hydrocortisone Sod Suc (PF) (Solu-CORTEF) injection 50 mg, 50 mg, Intravenous, Q6H, Jose Ramon Murphy MD, 50 mg at 05/04/24 1605    insulin glargine (LANTUS, SEMGLEE) injection 8 Units, 8 Units, Subcutaneous, Nightly, Luz Elena Batista DO, 8 Units at 05/03/24 2052    Insulin Lispro (humaLOG) injection 2-9 Units, 2-9 Units, Subcutaneous, 4x Daily AC & at Bedtime, Antonio Angeles MD, 2 Units at 05/04/24 1651    levothyroxine (SYNTHROID, LEVOTHROID) tablet 88 mcg, 88 mcg, Oral, Q AM, Maricel Peraza APRN, 88 mcg at 05/04/24 0529    Magnesium Low Dose Replacement - Follow Nurse / BPA Driven Protocol, , Does not apply, PRN, Maricel Peraza, DAVID    midodrine (PROAMATINE) tablet 10 mg, 10 mg, Oral, TID AC, Golden Madison MD, 10 mg at 05/04/24 4625     nitroglycerin (NITROSTAT) SL tablet 0.4 mg, 0.4 mg, Sublingual, Q5 Min PRN, Maricel Peraza APRN    norepinephrine (LEVOPHED) 8 mg in 250 mL NS infusion (premix), 0.02-0.3 mcg/kg/min, Intravenous, Titrated, Eyad Ledesma MD, Last Rate: 1.46 mL/hr at 24 1608, 0.01 mcg/kg/min at 24 1608    pantoprazole (PROTONIX) EC tablet 40 mg, 40 mg, Oral, Q AM, Antonio Angeles MD, 40 mg at 24 0529    polyethylene glycol (MIRALAX) packet 17 g, 17 g, Oral, Daily, Artur Avila MD, 17 g at 24 1037    prochlorperazine (COMPAZINE) injection 5 mg, 5 mg, Intravenous, Q6H PRN, Maricel Peraza APRN, 2.5 mg at 24 1316    sennosides-docusate (PERICOLACE) 8.6-50 MG per tablet 2 tablet, 2 tablet, Oral, BID, 2 tablet at 24 **AND** [DISCONTINUED] polyethylene glycol (MIRALAX) packet 17 g, 17 g, Oral, Daily PRN, 17 g at 24 **AND** [DISCONTINUED] bisacodyl (DULCOLAX) EC tablet 5 mg, 5 mg, Oral, Daily PRN, 5 mg at 24 0956 **AND** [DISCONTINUED] bisacodyl (DULCOLAX) suppository 10 mg, 10 mg, Rectal, Daily PRN, Antonio Angeles MD    sodium chloride 0.9 % flush 10 mL, 10 mL, Intravenous, PRN, Case, Hailee V., DO    sodium chloride 0.9 % flush 10 mL, 10 mL, Intravenous, Q12H, Case, Hailee V., DO, 10 mL at 24 0819    tamsulosin (FLOMAX) 24 hr capsule 0.4 mg, 0.4 mg, Oral, Daily, Maricel Peraza APRN, 0.4 mg at 24 0818    Antibiotics:  Anti-Infectives (From admission, onward)      Ordered     Dose/Rate Route Frequency Start Stop    24 1356  piperacillin-tazobactam (ZOSYN) 3.375 g IVPB in 100 mL NS MBP (CD)        Ordering Provider: Eyad Ledesma MD    3.375 g  over 30 Minutes Intravenous Once 24 1445 24 1557              Review of Systems:  See HPI      Physical Exam:   Vital Signs  Temp (24hrs), Av.5 °F (36.9 °C), Min:98.1 °F (36.7 °C), Max:99.1 °F (37.3 °C)    Temp  Min: 98.1 °F (36.7 °C)  Max: 99.1 °F (37.3 °C)  BP   "Min: 82/64  Max: 117/56  Pulse  Min: 66  Max: 87  Resp  Min: 16  Max: 18  SpO2  Min: 93 %  Max: 99 %    GENERAL:   Alert and responsive. Sitting up in bed  HEENT: Normocephalic, atraumatic.  No external oral lesions noted  NECK: Supple   HEART:  irregular rate, No murmur   LUNGS:     Clear to auscultation bilaterally.  Nonlabored breathing on room air  ABDOMEN: Soft, nontender, nondistended. No rebound or guarding. NO mass or HSM.  EXT: His right distal leg is in an  Unna boot.   :   Fonseca catheter in place. Yellow urine collection container  MSK: No joint effusions or erythema  SKIN: No generalized rashes noted.  No peripheral stigmata of infective endocarditis noted.  NEURO:  Awake alert and oriented.  Normal speech and cognition.    Right upper extremity PICC line site is without any erythema    Laboratory Data    Results from last 7 days   Lab Units 05/04/24  0528 05/03/24  0441 05/02/24  0433   WBC 10*3/mm3 9.42 10.37 10.65   HEMOGLOBIN g/dL 9.2* 8.9* 9.0*   HEMATOCRIT % 28.1* 27.3* 26.8*   PLATELETS 10*3/mm3 238 251 252     Results from last 7 days   Lab Units 05/04/24  0528   SODIUM mmol/L 142   POTASSIUM mmol/L 4.0   CHLORIDE mmol/L 110*   CO2 mmol/L 20.0*   BUN mg/dL 72*   CREATININE mg/dL 2.95*   GLUCOSE mg/dL 104*   CALCIUM mg/dL 8.0*     Results from last 7 days   Lab Units 05/04/24  0528   ALK PHOS U/L 77   BILIRUBIN mg/dL 0.4   ALT (SGPT) U/L 84*   AST (SGOT) U/L 12                         Estimated Creatinine Clearance: 25.4 mL/min (A) (by C-G formula based on SCr of 2.95 mg/dL (H)).      Microbiology:  No results found for: \"ACANTHNAEG\", \"AFBCX\", \"BPERTUSSISCX\", \"BLOODCX\"  No results found for: \"BCIDPCR\", \"CXREFLEX\", \"CSFCX\", \"CULTURETIS\"  No results found for: \"CULTURES\", \"HSVCX\", \"URCX\"  No results found for: \"EYECULTURE\", \"GCCX\", \"HSVCULTURE\", \"LABHSV\"  No results found for: \"LEGIONELLA\", \"MRSACX\", \"MUMPSCX\", \"MYCOPLASCX\"  No results found for: \"NOCARDIACX\", \"STOOLCX\"  Urine Culture   Date Value " "Ref Range Status   04/22/2024 No growth  Preliminary     No results found for: \"VIRALCULTU\", \"WOUNDCX\"        Radiology:  Imaging Results (Last 72 Hours)       Procedure Component Value Units Date/Time    SLP FEES - Fiberoptic Endo Eval Swallow [[id number]] Resulted: 05/02/24 1100     Updated: 05/02/24 1100    Narrative:      This procedure was auto-finalized with no dictation required.        I independently read his recent chest x-rays      Impression:    1.    Right upper lobe aspiration pneumonia-he is S/P Zosyn therapy.  He is clinically and radiographically improved.  Zosyn was discontinued on 5/1   2.  V-fib arrest-status post resuscitation.    3.   Acute kidney injury-superimposed on stage IIIb chronic kidney disease.   4.  Chronic right pretibial dermatitis-without evidence of cellulitis  5.  Type 2 diabetes mellitus  6.  coronary artery disease/status post CABG, 3/22/2019  7.  Severe systolic heart failure- with some improvement on his echocardiogram of 5/1.  8.  Stage IIIb chronic kidney disease  9.  Paroxysmal atrial fibrillation  10.  Acute hypoxic respiratory failure- improved   11.  Ischemic hepatitis- improved  12.  Pyuria- with a negative urine culture.  13.  Anoxic encephalopathy- improved  14.  Right fifth metatarsal wound/bullous lesion    PLAN/RECOMMENDATIONS:   1.  Continue with a right leg Unna boot  2.  Continue off of antibiotic therapy  3.  Diuretic and vasopressor therapy per  intensivist service.    I reviewed the patient's labs, recent micro, recent radiographs, and medications today.    His overall prognosis is extremely poor due to his severe underlying systolic congestive heart failure.   He may be transitioning to hospice.  I discussed with Dr. Murphy today    Jf Padilla MD  5/4/2024  17:15 EDT                  Electronically signed by Jf Padilla MD at 05/04/24 1720       Discharge Summary    No notes of this type exist for this encounter.       "

## 2024-05-07 NOTE — PROGRESS NOTES
Clinical Nutrition     Reason for Visit: Follow-up protocol      Patient Name: Jermaine Singh  YOB: 1945  MRN: [id number]  Date of Encounter: 05/07/24 12:42 EDT  Admission date: 4/22/2024      Nutrition Assessment   Admission Diagnosis:  UTI (urinary tract infection) [N39.0]      Problem List:    Cardiac arrest    Type 2 diabetes mellitus with hyperglycemia    Hyperlipidemia LDL goal <70    Hypothyroidism (acquired)    Coronary artery disease involving native coronary artery of native heart with angina pectoris    Pacemaker    HFrEF (heart failure with reduced ejection fraction)    Anemia, chronic disease    Stage 3b chronic kidney disease    UTI (urinary tract infection)    Acute respiratory failure with hypoxia    Elevated troponin level not due myocardial infarction        PMH:   He  has a past medical history of Allergic, Arthritis, Asthma, Coronary artery disease, Diabetes mellitus (2000), Diabetic foot infection (07/23/2023), Disease of thyroid gland, Elevated serum creatinine (11/15/2022), Elevated troponin (10/02/2022), Generalized weakness (11/15/2022), History of fracture as a child, Hyperlipidemia, Hypertension, Hypothyroidism, PAF (paroxysmal atrial fibrillation) (07/23/2023), Peripheral neuropathy, Peripheral vascular disease, Pleural effusion, bilateral (11/15/2022), Proximal phalanx fracture of the second digit extending into the second metatarsal joint (07/28/2022), RBBB, Right knee pain, Status post amputation of great toe and second toe of left foot (07/23/2023), Status post amputation of great toe, left (11/15/2022), Unstable angina (02/12/2019), and Vitamin D deficiency.    PSH:  He  has a past surgical history that includes Eye surgery (Bilateral); Leg Surgery; Tonsillectomy; Appendectomy; Colonoscopy; Laceration Repair; Cardiac catheterization (N/A, 02/14/2019); Knee arthroscopy; Coronary artery bypass graft (N/A, 03/22/2019); Interventional radiology procedure  (N/A, 07/29/2021); Cardiac electrophysiology procedure (N/A, 05/18/2022); Foot amputation through metatarsal (Left, 08/02/2022); Finger amputation (Left, 01/17/2023); Fracture surgery; and Cardiac surgery.      Applicable Nutrition Concerns:   Skin:L 3rd toe ulcer, R foot blisters, RLE wound,  B gluteal MASD  GI:last bm 5-6      Applicable Interval History:     V-fib arrest 4-25-24  ARF/VENT 4-25-24  Extubated 4-26-24    SLP Recommendation and Plan  SLP Diet Recommendation: regular textures, no mixed consistencies, nectar thick liquids (05/06/24 1440)  Recommended Precautions and Strategies: upright posture during/after eating, small bites of food and sips of liquid, no straw, multiple swallows per bite of food, multiple swallows per sip of liquid, general aspiration precautions, assist with feeding (05/06/24 1440)  SLP Rec. for Method of Medication Administration: meds whole, meds crushed, with puree, as tolerated (05/06/24 1440)  Reported/Observed/Food/Nutrition Related History:     5-7: pt sitting up on chair, flat affect, reports terrible appetite, has not touched his lunch tray, states his family is bring him food for dinner tonight    Plan for Pt to meet with Hospice again today      5-2: pt sitting up in chair, reports his appetite is waning, dislikes magic cups, is not hungry at present, c/o pain    Per RN: pt made 1250ml UOP last night, Palliative consulted today, plan for pt + family to meet with Hospice on Monday    5-1: pt resting in bed, is lethargic, reports not much appetite, family at bedside    Per RN: pt's mental status waxes and wanes, has been more talkative today, did not like his breakfast, did eat 3/4 of his magic cup, plan for repeat FEES tomorrow    4-29:Pt resting in bed, reports fair appetite, states UBW is 170lb    Labs    Labs Reviewed: Yes     Results from last 7 days   Lab Units 05/07/24  0538 05/06/24  0400 05/05/24  0523 05/04/24  0528 05/03/24  0441 05/02/24  0433   GLUCOSE mg/dL  "187* 252* 159* 104* 110* 165*   BUN mg/dL 79* 77* 71* 72* 79* 82*   CREATININE mg/dL 2.84* 2.83* 2.81* 2.95* 3.38* 3.30*   SODIUM mmol/L 136 137 142 142 140 136   CHLORIDE mmol/L 104 104 109* 110* 109* 107   POTASSIUM mmol/L 4.4 4.3 4.1 4.0 4.2 4.4   PHOSPHORUS mg/dL 3.9 4.6*  --  4.5  --  4.9*   MAGNESIUM mg/dL 2.6*  --   --  2.5*  --  2.8*   ALT (SGPT) U/L  --   --  65* 84* 106* 132*       Results from last 7 days   Lab Units 05/06/24  0400 05/05/24  0523 05/04/24  0528   ALBUMIN g/dL 3.3* 3.3* 2.8*       Results from last 7 days   Lab Units 05/07/24  1123 05/07/24  0714 05/06/24 2012 05/06/24  1626 05/06/24  1126 05/06/24  0726   GLUCOSE mg/dL 158* 218* 235* 246* 285* 251*     Lab Results   Lab Value Date/Time    HGBA1C 8.50 (H) 04/23/2024 0550    HGBA1C 8.70 (H) 07/23/2023 2235         Results from last 7 days   Lab Units 05/06/24  0400   PROBNP pg/mL >70,000.0*         Medications    Medications Reviewed: Yes    Intake/Ouptut 24 hrs (0701 - 0700)   I&O's Reviewed: Yes     Intake & Output (last day)         05/06 0701 05/07 0700 05/07 0701 05/08 0700    P.O. 720 240    I.V. (mL/kg)      Total Intake(mL/kg) 720 (8.3) 240 (2.8)    Urine (mL/kg/hr) 685 (0.3)     Stool 0     Total Output 685     Net +35 +240          Stool Unmeasured Occurrence 1 x               Anthropometrics     Flowsheet Rows      Flowsheet Row First Filed Value   Admission Height 190.5 cm (75\") Documented at 04/22/2024 1438   Admission Weight 77.1 kg (170 lb) Documented at 04/22/2024 1438          Height: Height: 190 cm (74.8\")  Last Filed Weight: Weight: 87.1 kg (192 lb 0.3 oz) (05/05/24 0523)  Method: Weight Method: Bed scale  BMI: BMI (Calculated): 24.1  BMI classification: Normal: 18.5-24.9kg/m2    UBW: 170lb    Weight change: 22lb wt gain since admission per EMR, ? Inaccurate bedscale wt     Weight       Weight (kg) Weight (lbs) Weight Method Visit Report   1/17/2023 90.447 kg  199 lb 6.4 oz       90.447 kg  199 lb 6.4 oz      1/18/2023 " "97.569 kg  215 lb 1.6 oz  Bed scale      91.899 kg  202 lb 9.6 oz  Standing scale     2023 93.895 kg  207 lb  Bed scale     2023 92.987 kg  205 lb  Bed scale     2023 92.761 kg  204 lb 8 oz  Bed scale     2023 91.309 kg  201 lb 4.8 oz  Bed scale     2023 91.173 kg  201 lb   --    2/15/2023 89.812 kg  198 lb   --    3/8/2023 88.905 kg  196 lb   --    3/14/2023 88.905 kg  196 lb   --    3/22/2023 88.451 kg  195 lb   --    2023 84.823 kg  187 lb   --    2023 84.823 kg  187 lb   --    2023 80.287 kg  177 lb  Stated      81.285 kg  179 lb 3.2 oz      2023 80.287 kg  177 lb   --    2023 83.915 kg  185 lb   --    10/18/2023 76.658 kg  169 lb      10/19/2023 76.658 kg  169 lb   --    3/5/2024 77.111 kg  170 lb   --    3/9/2024 77.1 kg  169 lb 15.6 oz  Estimated     2024 77.111 kg  170 lb  Stated     2024 77.701 kg  171 lb 4.8 oz  Bed scale     2024 77 kg  169 lb 12.1 oz      2024 81.5 kg  179 lb 10.8 oz  Bed scale     2024 81.2 kg  179 lb 0.2 oz  Bed scale     2024 84.4 kg  186 lb 1.1 oz  Bed scale     2024 87.7 kg  193 lb 5.5 oz          Needs Assessment   Date: 5-3-24    Height used:Height: 190 cm (74.8\")  Weights used: 171lb presumed dry weight      Estimated Calorie needs: ~1900kcal  Method: 25 Kcals/Kkcal  Method:  MSJ x 1.2: 1897kcal    Estimated Protein needs: ~62g protein with current renal function  Method: .08-1.2g protein: 62-94g protein      Nutrition Focused Physical Exam     Date:     Unable to perform exam due to: Potential for patient discomfort      Current Nutrition Prescription     PO: Diet: Cardiac, Diabetic; Healthy Heart (2-3 Na+); Consistent Carbohydrate; No Mixed Consistencies, No Straw, Feeding Assistance - Nursing; Texture: Regular (IDDSI 7); Fluid Consistency: Bond Thick  Oral Nutrition Supplement: Yogurt 3x/day  Intake:  33% of 6meals      Nutrition Diagnosis   Date: 24 Updated: 5-7  Problem " Inadequate oral intake   Etiology Per Clinical Status   Signs/Symptoms Po intake 33%       Goal:   General: Nutrition to support treatment, Palliative care  PO: Tolerate PO  EN/PN: N/A    Nutrition Intervention      Follow treatment progress, Advise alternate selection, Interview for preferences, Encourage intake    Monitoring/Evaluation:   Per protocol, I&O, PO intake, Supplement intake, Pertinent labs, Weight, Skin status, GI status, Symptoms, POC/GOC      Rosalinda Edge, BHUMIKA  Time Spent: 20min

## 2024-05-07 NOTE — PROGRESS NOTES
INFECTIOUS DISEASE Progress Note    Jermaine Singh  1945  [id number]    Admission Date: 4/22/2024      Requesting Provider: No ref. provider found  Evaluating Physician: Lincoln Padilla MD    Reason for Consultation:  UTI    History of present illness:    4/23/24: Patient is a 79 y.o. male  with a history of type 2 diabetes mellitus, peripheral neuropathy, stage III chronic kidney disease, bilateral DVTs, paroxysmal atrial fibrillation, sick sinus syndrome status post pacemaker placement, CHF, peripheral vascular disease, and left diabetic foot infection who is seen today for evaluation of UTI.  He had a history of UTI last month with urine cultures and early March growing yeast.  He was apparently treated with an antifungal agent.    Over the last few days he noted the onset of profound weakness and fatigue.  He also noticed some dysuria.  He was brought to the emergency room and found to have pyuria and bacteriuria with leukocytosis.   Urine culture was sent and he was started on ceftriaxone.   His white blood cell count today is 11.8.   He was noted to have a blister over his right fifth metatarsal head and a chronic right pretibial wound.    4/24/24: He remains afebrile.  His urine culture was finalized as negative.  I have asked the laboratory to hold the urine culture for yeast but they did not and it was disposed of last night.   He states that his dysuria is improved today.  Feels better.     4/25/2024:  He remains afebrile.  White blood cell count today is 6.7.  Creatinine is 1.99.   Urinalysis yesterday revealed too numerous to count white blood cells.  He denied dysuria this morning.  After I saw him he suffered a V-fib cardiac arrest and was transferred to the ICU.  He is endotracheally intubated    4/26/24:   His maximum temperature over the last 24 hours is 99.5.  Repeat urine culture from 4/24 is pending.  He remains on ventilatory support.    Creatinine is 2.28.  ALT is  1028.  White  blood cell count is 9.95.  O2 saturation is 100% on an FiO2 of 30%.  Chest x-ray reveals no pulmonary infiltrates. \    Later this morning he was extubated.  He is confused.  He knows his name and his birthdate.  He cannot answer more complex questions.    4/27/2024:  His maximum temperature over the last 24 hours is 99.5.  His left ventricular ejection fraction is less than 20%.  His white blood cell count is 2.37.  ALT is down to 626.   Chest x-ray 4/27 reveals moderate hemoglobin airspace disease.   O2 saturation is 99% on 4 L.   He is on Zosyn therapy.   Later today his oxygen demand increased and he was placed   On BiPAP.     4/28/2024:   He has remained afebrile.   White blood cell count is 10.1. His creatinine is 2.65 ALT is 450.  His oxygen demand is down to 1 liter today. Chest x-ray today reveals interval improvement in the right upper lobe infiltrate.  He is coughing and has some sputum production.  He remains confused and did not recognize his wife and daughter today      4/29/2024: He remains afebrile.  His creatinine is 2.52.  ALT is 320.  White blood cell count is 9.3.    He is now on 1 L with an O2 saturation of 96%.  He is more alert and responsive today.  He is less confused.     4/30/2024: He has remained afebrile.  Creatinine is 2.68.  ALT is 218.   White blood cell count is 9.1.  room air. room air.  His confusion is improved today.  He recognized his wife today.  He denies increased cough and sputum production.  He knows me by name today.    5/1/2024:  He remains afebrile.  His creatinine today is 3.39.   White blood cell count is 8.9. O2 saturation is 97% on room air.      5/2/2024:  He remains afebrile.  His creatinine today is 3.3.  White blood cell count is 10.7.   Urine eosinophil smear was 0. He has continued to require vasopressor therapy.   Echocardiogram from yesterday reveals a 21-25% ejection fraction.   He feels better today.  His daughter indicates that  is cognitive function has  improved and he is no longer confused.    5/3/2024:  Maximum temperature over the last 24 hours is 99.4.  Creatinine is 3.38.    ALT is 106.   white blood cell count is 10.4.  O2 saturation is 94% on room air.  He denies increased dyspnea.  He is still requiring low-dose vasopressor support.    5/4/24: History reviewed.  I am following the patient for Dr. Lincoln Padilla over the weekend. The patient denies any worsening shortness of breath.  Breathing is nonlabored on room air.  He has not had any fevers.  He is now off of antibiotics since 5/1.  Creatinine was downtrending to 2.95, from 3.38.  White blood count is normal at 9.42.  He denies any nausea, vomiting, or diarrhea.  He denies peripheral edema.  Right lower extremity with Unna boot in place.    5/5/24: Patient had a temperature up to 100.2°F today.  No shaking chills.  Fonseca catheter remains in place.  No abdominal pain.  No worsening shortness of breath or productive cough.  No new rashes reported.  No increased leg pain.  He denies any diarrhea.    5/6/2024: His maximum temperature over the last 24 hours is 100.2.  He had a low-grade fever to 99.7 this morning.  Chest x-ray reveals mild left basilar atelectasis.  Creatinine is 2.83.  White blood cell count is 7.9.  ProBNP is greater than 70,000.  He denies increased dyspnea, cough, sputum production.  He still has a Fonseca catheter in place    5/7/2024: Maximum temperature over the last 24 hours is 99.7.  Creatinine is 2.84.  White blood cell count is 10.6.   He is being evaluated by hospice.   His Fonseca catheter was removed yesterday.He denies increased cough and dyspnea.    Past Medical History:   Diagnosis Date    Allergic     Arthritis     Asthma     Coronary artery disease     Diabetes mellitus 2000    started on inuslin 12/2018; started on po meds in 2000; checking blood sugars daily     Diabetic foot infection 07/23/2023    Disease of thyroid gland     po meds daily for hypothyroidism      Elevated serum creatinine 11/15/2022    Elevated troponin 10/02/2022    Generalized weakness 11/15/2022    History of fracture as a child     rt leg- severe     Hyperlipidemia     Hypertension     Hypothyroidism     PAF (paroxysmal atrial fibrillation) 07/23/2023    Peripheral neuropathy     Peripheral vascular disease     s/p angiogram 2/19-needs stent in left leg     Pleural effusion, bilateral 11/15/2022    Proximal phalanx fracture of the second digit extending into the second metatarsal joint 07/28/2022    RBBB     Right knee pain     Status post amputation of great toe and second toe of left foot 07/23/2023    Status post amputation of great toe, left 11/15/2022    Unstable angina 02/12/2019    Added automatically from request for surgery [id number]    Vitamin D deficiency        Past Surgical History:   Procedure Laterality Date    AMPUTATION DIGIT Left 01/17/2023    Procedure: AMPUTATION DIGIT LEFT;  Surgeon: Gopal Márquez MD;  Location:  [facility];  Service: Vascular;  Laterality: Left;    APPENDECTOMY      CARDIAC CATHETERIZATION N/A 02/14/2019    Procedure: Left Heart Cath;  Surgeon: Cooper Apodaca MD;  Location: [facility] CATH INVASIVE LOCATION;  Service: Cardiology    CARDIAC ELECTROPHYSIOLOGY PROCEDURE N/A 05/18/2022    Procedure: DEVICE IMPLANT;  Surgeon: Cooper Apodaca MD;  Location: [facility] CATH INVASIVE LOCATION;  Service: Cardiology;  Laterality: N/A;    CARDIAC SURGERY      COLONOSCOPY      CORONARY ARTERY BYPASS GRAFT N/A 03/22/2019    Procedure: MEDIAN STERNOTOMY, CORONARY ARTERY BYPASS GRAFT X3, UTILIZING THE LEFT INTERNAL MAMMARY ARTERY, EVH AND OPEN HARVEST OF THE RIGHT GREATER SAPHENOUS VEIN, EXPLORATION OF THE LEFT LEG;  Surgeon: Ap Au MD;  Location:  [facility];  Service: Cardiothoracic    EYE SURGERY Bilateral     cataracts     FRACTURE SURGERY      INTERVENTIONAL RADIOLOGY PROCEDURE N/A 07/29/2021    Procedure: Abdominal Aortagram with Runoff;  Surgeon: Jermaine Hernandez  MD Billy;  Location:  [facility]ASIVE [facility]ATION;  Service: Cardiovascular;  Laterality: N/A;    KNEE ARTHROSCOPY      right x 2, left x 1    LACERATION REPAIR      right leg    LEG SURGERY      2 for fracture of rt leg     TONSILLECTOMY      Adnoidectomy    TRANS METATARSAL AMPUTATION Left 08/02/2022    Procedure: GREAT TOE AMPUTATION LEFT;  Surgeon: Gopal Márquez MD;  Location:  [facility];  Service: Vascular;  Laterality: Left;       Family History   Problem Relation Age of Onset    Coronary artery disease Mother     Diabetes Mother     Hyperlipidemia Mother     Cancer Father        Social History     Socioeconomic History    Marital status:     Number of children: 2   Tobacco Use    Smoking status: Never     Passive exposure: Past    Smokeless tobacco: Never   Vaping Use    Vaping status: Never Used   Substance and Sexual Activity    Alcohol use: Not Currently     Comment: every 2-3 months    Drug use: No    Sexual activity: Not Currently     Partners: Female       Allergies   Allergen Reactions    Vancomycin Other (See Comments)     Kidney failure per last admission         Medication:    Current Facility-Administered Medications:     acetaminophen (TYLENOL) tablet 650 mg, 650 mg, Oral, Q6H PRN, Maricel Peraza APRN, 650 mg at 05/05/24 2351    albuterol (PROVENTIL) nebulizer solution 0.083% 2.5 mg/3mL, 2.5 mg, Nebulization, BID PRN, Nathalia Mckeon DNP, APRN, 2.5 mg at 04/30/24 0755    apixaban (ELIQUIS) tablet 2.5 mg, 2.5 mg, Oral, Q12H, Antonio Angeles MD, 2.5 mg at 05/06/24 2122    aspirin chewable tablet 81 mg, 81 mg, Oral, Daily, Maricel Peraza APRN, 81 mg at 05/06/24 0806    atorvastatin (LIPITOR) tablet 20 mg, 20 mg, Oral, Nightly, Golden Madison MD, 20 mg at 05/06/24 2122    castor oil-balsam peru (VENELEX) ointment 1 Application, 1 Application, Topical, Daily, Eyad Ledesma MD, 1 Application at 05/06/24 0806    dextrose (D50W) (25 g/50 mL) IV injection 10-50 mL, 10-50  mL, Intravenous, Q15 Min PRN, Jose Ramon Murphy MD    dextrose (GLUTOSE) oral gel 15 g, 15 g, Oral, Q15 Min PRN, Jose Ramon Murphy MD    furosemide (LASIX) injection 80 mg, 80 mg, Intravenous, Daily, Blake Tilley MD, 80 mg at 05/06/24 0806    glucagon (GLUCAGEN) injection 1 mg, 1 mg, Intramuscular, Q15 Min PRN, Jose Ramon Murphy MD    [COMPLETED] Hydrocortisone Sod Suc (PF) (Solu-CORTEF) injection 100 mg, 100 mg, Intravenous, Once, 100 mg at 05/04/24 0951 **FOLLOWED BY** Hydrocortisone Sod Suc (PF) (Solu-CORTEF) injection 50 mg, 50 mg, Intravenous, Q8H, Fran Flores MD, 50 mg at 05/07/24 0010    insulin glargine (LANTUS, SEMGLEE) injection 1-200 Units, 1-200 Units, Subcutaneous, Nightly - Glucommander, Jose Ramon Murphy MD, 16 Units at 05/06/24 2123    insulin lispro (humaLOG) injection 1-200 Units, 1-200 Units, Subcutaneous, 4x Daily With Meals & Nightly, Jose Ramon Murphy MD, 1 Units at 05/06/24 2123    insulin lispro (humaLOG) injection 1-200 Units, 1-200 Units, Subcutaneous, PRN, Jose Ramon Murphy MD, 4 Units at 05/05/24 1751    levothyroxine (SYNTHROID, LEVOTHROID) tablet 88 mcg, 88 mcg, Oral, Q AM, Maricel Peraza APRN, 88 mcg at 05/07/24 0521    Magnesium Low Dose Replacement - Follow Nurse / BPA Driven Protocol, , Does not apply, PRN, Maricel Peraza APRN    midodrine (PROAMATINE) tablet 10 mg, 10 mg, Oral, TID AC, Golden Madison MD, 10 mg at 05/07/24 0521    nitroglycerin (NITROSTAT) SL tablet 0.4 mg, 0.4 mg, Sublingual, Q5 Min PRN, Maricel Peraza APRN    norepinephrine (LEVOPHED) 8 mg in 250 mL NS infusion (premix), 0.02-0.3 mcg/kg/min, Intravenous, Titrated, Eyad Ledesma MD, Stopped at 05/05/24 1242    pantoprazole (PROTONIX) EC tablet 40 mg, 40 mg, Oral, Q AM, Antonio Angeles MD, 40 mg at 05/07/24 0521    polyethylene glycol (MIRALAX) packet 17 g, 17 g, Oral, Daily, Artur Avila MD, 17 g at 05/02/24 1037    prochlorperazine (COMPAZINE) injection 5 mg, 5 mg, Intravenous,  Q6H PRN, Maricel Peraza APRN, 2.5 mg at 24 1316    sennosides-docusate (PERICOLACE) 8.6-50 MG per tablet 2 tablet, 2 tablet, Oral, BID, 2 tablet at 24 **AND** [DISCONTINUED] polyethylene glycol (MIRALAX) packet 17 g, 17 g, Oral, Daily PRN, 17 g at 24 **AND** [DISCONTINUED] bisacodyl (DULCOLAX) EC tablet 5 mg, 5 mg, Oral, Daily PRN, 5 mg at 24 0956 **AND** [DISCONTINUED] bisacodyl (DULCOLAX) suppository 10 mg, 10 mg, Rectal, Daily PRN, Antonio Angeles MD    sodium chloride 0.9 % flush 10 mL, 10 mL, Intravenous, PRN, Case, Hailee V., DO    sodium chloride 0.9 % flush 10 mL, 10 mL, Intravenous, Q12H, Case, Hailee V., DO, 10 mL at 24    tamsulosin (FLOMAX) 24 hr capsule 0.4 mg, 0.4 mg, Oral, Daily, Maricel Peraza APRN, 0.4 mg at 24 0806    Antibiotics:  Anti-Infectives (From admission, onward)      Ordered     Dose/Rate Route Frequency Start Stop    24 1356  piperacillin-tazobactam (ZOSYN) 3.375 g IVPB in 100 mL NS MBP (CD)        Ordering Provider: Eyad Ledesma MD    3.375 g  over 30 Minutes Intravenous Once 24 1445 24 1557              Review of Systems:  See HPI      Physical Exam:   Vital Signs  Temp (24hrs), Av.7 °F (37.1 °C), Min:97.7 °F (36.5 °C), Max:99.7 °F (37.6 °C)    Temp  Min: 97.7 °F (36.5 °C)  Max: 99.7 °F (37.6 °C)  BP  Min: 91/76  Max: 121/76  Pulse  Min: 64  Max: 88  Resp  Min: 16  Max: 20  SpO2  Min: 92 %  Max: 97 %    GENERAL:   Alert and responsive. Sitting up in bed  HEENT: Normocephalic, atraumatic.  No external oral lesions noted  NECK: Supple   HEART:  irregular rate, No murmur   LUNGS:   Diminished in the lung bases.  Nonlabored breathing on room air  ABDOMEN: Soft, nontender, nondistended. No rebound or guarding. NO mass or HSM.  EXT: Right lower extremity with Unna boot in place.    Photos of his right pretibial area yesterday revealed decreased erythema.  :   Fonseca catheter is out  MSK: No  "joint effusions or erythema  SKIN: No generalized rashes noted.  No peripheral stigmata of infective endocarditis noted.  NEURO:  Awake alert and oriented.  Normal speech and cognition.  Right upper extremity PICC line site is without any erythema    Laboratory Data    Results from last 7 days   Lab Units 05/07/24  0538 05/06/24  0400 05/05/24  0523   WBC 10*3/mm3 10.63 7.91 6.61   HEMOGLOBIN g/dL 8.8* 8.4* 9.0*   HEMATOCRIT % 27.4* 26.1* 27.6*   PLATELETS 10*3/mm3 223 207 187     Results from last 7 days   Lab Units 05/07/24  0538   SODIUM mmol/L 136   POTASSIUM mmol/L 4.4   CHLORIDE mmol/L 104   CO2 mmol/L 20.0*   BUN mg/dL 79*   CREATININE mg/dL 2.84*   GLUCOSE mg/dL 187*   CALCIUM mg/dL 8.2*     Results from last 7 days   Lab Units 05/05/24  0523   ALK PHOS U/L 75   BILIRUBIN mg/dL 0.5   ALT (SGPT) U/L 65*   AST (SGOT) U/L 11                         Estimated Creatinine Clearance: 26 mL/min (A) (by C-G formula based on SCr of 2.84 mg/dL (H)).      Microbiology:  No results found for: \"ACANTHNAEG\", \"AFBCX\", \"BPERTUSSISCX\", \"BLOODCX\"  No results found for: \"BCIDPCR\", \"CXREFLEX\", \"CSFCX\", \"CULTURETIS\"  No results found for: \"CULTURES\", \"HSVCX\", \"URCX\"  No results found for: \"EYECULTURE\", \"GCCX\", \"HSVCULTURE\", \"LABHSV\"  No results found for: \"LEGIONELLA\", \"MRSACX\", \"MUMPSCX\", \"MYCOPLASCX\"  No results found for: \"NOCARDIACX\", \"STOOLCX\"  Urine Culture   Date Value Ref Range Status   04/22/2024 No growth  Preliminary     No results found for: \"VIRALCULTU\", \"WOUNDCX\"        Radiology:  Imaging Results (Last 72 Hours)       Procedure Component Value Units Date/Time    XR Chest 1 View [[id number]] Collected: 05/06/24 0715     Updated: 05/06/24 0720    Narrative:      XR CHEST 1 VW    Date of Exam: 5/6/2024 3:02 AM EDT    Indication: Heart Failure    Comparison: 4/28/2024    Findings:  Patient is status post median sternotomy and CABG. Left chest wall pacemaker is present. There is a right arm PICC which terminates " overlying the SVC. There is a small left pleural effusion with left basilar atelectasis. Lungs appear otherwise adequately   aerated. Cardiac silhouette is magnified. No obvious pneumothorax. Osseous structures are unchanged.          Impression:      Impression:  1.Possible small left pleural effusion. Mild left basilar atelectasis.      Electronically Signed: Lucio Lanier MD    5/6/2024 7:17 AM EDT    Workstation ID: [id number]        I read his chest x-ray of 5/6      Impression:    1.    Right upper lobe aspiration pneumonia-he is S/P Zosyn therapy.  He is clinically and radiographically improved.  Zosyn was discontinued on 5/1   2.  V-fib arrest-status post resuscitation.    3.   Acute kidney injury-superimposed on stage IIIb chronic kidney disease.   4.  Chronic right pretibial dermatitis-without evidence of cellulitis  5.  Type 2 diabetes mellitus  6.  coronary artery disease/status post CABG, 3/22/2019  7.  Severe systolic heart failure- with some improvement on his echocardiogram of 5/1.  8.  Stage IIIb chronic kidney disease  9.  Paroxysmal atrial fibrillation  10.  Acute hypoxic respiratory failure- improved   11.  Ischemic hepatitis- improved  12.  Pyuria- with a negative urine culture.  13.  Anoxic encephalopathy- improved  14.  Right fifth metatarsal wound/bullous lesion  15.  Fever-low-grade.  This may be secondary to atelectasis.     PLAN/RECOMMENDATIONS:   1.  Continue with a right leg Unna boot  2.  Continue off of antibiotic therapy.        Lincoln Padilla MD  5/7/2024  06:23 EDT

## 2024-05-07 NOTE — CASE MANAGEMENT/SOCIAL WORK
Continued Stay Note  [facility]     Patient Name: Jermaine Singh  MRN: [id number]  Today's Date: 5/7/2024    Admit Date: 4/22/2024    Plan: Home with Hospice   Discharge Plan       Row Name 05/07/24 1222       Plan    Plan Home with Hospice    Patient/Family in Agreement with Plan yes    Plan Comments  spoke with [facility]/Hospice team, on the phone. She states that the pt's wife has questions about rehab.  spoke with PT/Kianna. She states she worked with pt and that the pt wants to go home with Hospice. Kianna states pt will need a maliha lift, at home.  spoke with Mr. Singh, at the bedside. He states he wants to go home with Hospice. CM spoke with Mrs. Singh, on the phone. CM let her know that the pt stated he wants to go home with Hospice. CM also let her know that Hospice can set up for PT to come to the home. She states she feels home is the best place for him and is agreeable with PT in the home. CM updated Ani/Hospice, by phone. She will reach out to pt's wife about home equipment and PT at home. Hospice will set up transportation.    Final Discharge Disposition Code 50 - home with hospice                   Discharge Codes    No documentation.                       Stacy Akers RN

## 2024-05-07 NOTE — THERAPY TREATMENT NOTE
Patient Name: Jermaine Singh  : 1945    MRN: [id number]                              Today's Date: 2024       Admit Date: 2024    Visit Dx:     ICD-10-CM ICD-9-CM   1. Acute UTI  N39.0 599.0   2. Generalized weakness  R53.1 780.79   3. Edema of right lower extremity  R60.0 782.3   4. Leg erythema  L53.9 695.9   5. Chronic kidney disease, unspecified CKD stage  N18.9 585.9   6. Elevated troponin  R79.89 790.6   7. Hyperkalemia  E87.5 276.7   8. Cardiac arrest  I46.9 427.5   9. Oropharyngeal dysphagia  R13.12 787.22     Patient Active Problem List   Diagnosis    Type 2 diabetes mellitus with hyperglycemia    Hyperlipidemia LDL goal <70    Hypothyroidism (acquired)    Vitamin D deficiency on Rx     Diabetic neuropathy    Coronary artery disease involving native coronary artery of native heart with angina pectoris    Atherosclerosis of native artery of both lower extremities with intermittent claudication    Pacemaker    HFrEF (heart failure with reduced ejection fraction)    DVT, bilateral lower limbs    Cellulitis of right lower extremity    Anemia, chronic disease    PVD (peripheral vascular disease)    Diabetic foot ulcer    GERD without esophagitis    Stage 3b chronic kidney disease    Right inguinal hernia    PAF (paroxysmal atrial fibrillation)    Moderate malnutrition    UTI (urinary tract infection)    Cardiac arrest    Acute respiratory failure with hypoxia    History of DVT (deep vein thrombosis)    Elevated troponin level not due myocardial infarction     Past Medical History:   Diagnosis Date    Allergic     Arthritis     Asthma     Coronary artery disease     Diabetes mellitus 2000    started on inuslin 2018; started on po meds in ; checking blood sugars daily     Diabetic foot infection 2023    Disease of thyroid gland     po meds daily for hypothyroidism     Elevated serum creatinine 11/15/2022    Elevated troponin 10/02/2022    Generalized weakness 11/15/2022     History of fracture as a child     rt leg- severe     Hyperlipidemia     Hypertension     Hypothyroidism     PAF (paroxysmal atrial fibrillation) 07/23/2023    Peripheral neuropathy     Peripheral vascular disease     s/p angiogram 2/19-needs stent in left leg     Pleural effusion, bilateral 11/15/2022    Proximal phalanx fracture of the second digit extending into the second metatarsal joint 07/28/2022    RBBB     Right knee pain     Status post amputation of great toe and second toe of left foot 07/23/2023    Status post amputation of great toe, left 11/15/2022    Unstable angina 02/12/2019    Added automatically from request for surgery [id number]    Vitamin D deficiency      Past Surgical History:   Procedure Laterality Date    AMPUTATION DIGIT Left 01/17/2023    Procedure: AMPUTATION DIGIT LEFT;  Surgeon: Gopal Márquez MD;  Location:  [facility] OR;  Service: Vascular;  Laterality: Left;    APPENDECTOMY      CARDIAC CATHETERIZATION N/A 02/14/2019    Procedure: Left Heart Cath;  Surgeon: Cooper Apodaca MD;  Location: [facility] CATH INVASIVE LOCATION;  Service: Cardiology    CARDIAC ELECTROPHYSIOLOGY PROCEDURE N/A 05/18/2022    Procedure: DEVICE IMPLANT;  Surgeon: Cooper Apodaca MD;  Location: [facility] CATH INVASIVE LOCATION;  Service: Cardiology;  Laterality: N/A;    CARDIAC SURGERY      COLONOSCOPY      CORONARY ARTERY BYPASS GRAFT N/A 03/22/2019    Procedure: MEDIAN STERNOTOMY, CORONARY ARTERY BYPASS GRAFT X3, UTILIZING THE LEFT INTERNAL MAMMARY ARTERY, EVH AND OPEN HARVEST OF THE RIGHT GREATER SAPHENOUS VEIN, EXPLORATION OF THE LEFT LEG;  Surgeon: Ap Au MD;  Location: [facility] OR;  Service: Cardiothoracic    EYE SURGERY Bilateral     cataracts     FRACTURE SURGERY      INTERVENTIONAL RADIOLOGY PROCEDURE N/A 07/29/2021    Procedure: Abdominal Aortagram with Runoff;  Surgeon: Jermaine Hernandez MD;  Location: [facility] CATH INVASIVE LOCATION;  Service: Cardiovascular;  Laterality: N/A;    KNEE  ARTHROSCOPY      right x 2, left x 1    LACERATION REPAIR      right leg    LEG SURGERY      2 for fracture of rt leg     TONSILLECTOMY      Adnoidectomy    TRANS METATARSAL AMPUTATION Left 08/02/2022    Procedure: GREAT TOE AMPUTATION LEFT;  Surgeon: Gopal Márquez MD;  Location: [facility];  Service: Vascular;  Laterality: Left;      General Information       Row Name 05/07/24 1344          OT Time and Intention    Document Type therapy note (daily note)  -     Mode of Treatment occupational therapy  -       Row Name 05/07/24 1344          General Information    Patient Profile Reviewed yes  -     Existing Precautions/Restrictions fall;cardiac;oxygen therapy device and L/min  -     Barriers to Rehab medically complex  -       Row Name 05/07/24 1344          Cognition    Orientation Status (Cognition) oriented x 3  -       Row Name 05/07/24 1344          Safety Issues, Functional Mobility    Safety Issues Affecting Function (Mobility) awareness of need for assistance;insight into deficits/self-awareness;safety precaution awareness;sequencing abilities  -     Impairments Affecting Function (Mobility) balance;endurance/activity tolerance;strength;postural/trunk control;pain;motor control;motor planning;shortness of breath  -               User Key  (r) = Recorded By, (t) = Taken By, (c) = Cosigned By      Initials Name Provider Type    Naye Davila OT Occupational Therapist                   Lymphedema       Row Name 05/06/24 1000             Lymphedema Edema Assessment    Ptting Edema Category By severity  -      Pitting Edema Mild  -LH      Recorded by [] Viet Pollock, PT              Skin Changes/Observations    Location/Assessment Lower Extremity  -      Lower Extremity Conditions right:;clean;dry  -      Lower Extremity Color/Pigment right:;normal  -LH      Recorded by [] Viet Pollock, PT              Lymphedema Pulses/Capillary Refill    Capillary Refill lower extremity  "capillary refill  -      Lower Extremity Capillary Refill right:;left:;less than 3 seconds  -LH      Recorded by [] Viet Pollock, PT              Compression/Skin Care    Compression/Skin Care skin care;wrapping location;bandaging  -      Skin Care washed/dried;lotion applied  -      Wrapping Location lower extremity  -      Wrapping Location LE right:;foot to knee  -      Wrapping Comments RLE Unna boot applied in clamshell pattern, 4\" coban, size 5 spandage to secure.  -LH      Recorded by [] Viet Pollock, PT                User Key  (r) = Recorded By, (t) = Taken By, (c) = Cosigned By      Initials Name Effective Dates     Viet Pollock, PT 09/21/21 -                    Mobility/ADL's       Row Name 05/07/24 1345          Bed Mobility    Supine-Sit [place] (Bed Mobility) moderate assist (50% patient effort);1 person assist;verbal cues  -     Assistive Device (Bed Mobility) head of bed elevated;bed rails;draw sheet  -       Row Name 05/07/24 1345          Bed-Chair Transfer    Bed-Chair [place] (Transfers) moderate assist (50% patient effort);2 person assist;verbal cues  -     Assistive Device (Bed-Chair Transfers) other (see comments)  BUE support  -       Row Name 05/07/24 1345          Sit-Stand Transfer    Sit-Stand [place] (Transfers) moderate assist (50% patient effort);2 person assist;verbal cues;nonverbal cues (demo/gesture)  -     Assistive Device (Sit-Stand Transfers) other (see comments)  BUE support  -       Row Name 05/07/24 1345          Stand-Sit Transfer    Stand-Sit [place] (Transfers) minimum assist (75% patient effort);2 person assist;verbal cues  -     Assistive Device (Stand-Sit Transfers) other (see comments)  BUE support  -       Row Name 05/07/24 1345          Activities of Daily Living    BADL Assessment/Intervention lower body dressing  -       Row Name 05/07/24 1345          Lower Body Dressing Assessment/Training    " [place] Level (Lower Body Dressing) don;socks;dependent (less than 25% patient effort)  -     Position (Lower Body Dressing) supine  -               User Key  (r) = Recorded By, (t) = Taken By, (c) = Cosigned By      Initials Name Provider Type    Naye Davila OT Occupational Therapist                   Obj/Interventions       Row Name 05/07/24 1346          Balance    Static Sitting Balance minimal assist  -     Dynamic Sitting Balance minimal assist;verbal cues  -     Position, Sitting Balance supported;sitting edge of bed;sitting in chair  -     Static Standing Balance moderate assist;2-person assist  -     Dynamic Standing Balance moderate assist;2-person assist  -     Position/Device Used, Standing Balance supported  -     Balance Interventions sitting;standing;sit to stand;supported;static;dynamic;occupation based/functional task  -               User Key  (r) = Recorded By, (t) = Taken By, (c) = Cosigned By      Initials Name Provider Type    Naye Davila OT Occupational Therapist                   Goals/Plan    No documentation.                  Clinical Impression       Row Name 05/07/24 1347          Pain Assessment    Pre/Posttreatment Pain Comment dizziness upon sitting EOB; vitals WNL  -[facility] Name 05/07/24 1347          Pain Scale: FACES Pre/Post-Treatment    Pain: FACES Scale, Pretreatment 0-->no hurt  -     Posttreatment Pain Rating 0-->no hurt  -KL       [facility] Name 05/07/24 1341          Plan of Care Review    Plan of Care Reviewed With patient  -     Progress no change  -     Outcome Evaluation Pt continues to present to OT w/ deficits in functional endurance and weakness. Pt is dizzy w/ all position changes. Will contiues to benefit from IPOT to prevent debility and address deficits. d/c rec is SNF. Please refer to PT note on equipment rec.  -       Row Name 05/07/24 1343          Therapy Plan Review/Discharge Plan (OT)    Anticipated Discharge Disposition  (OT) skilled nursing [facility]  -       Row Name 05/07/24 1347          Vital Signs    Pre Systolic BP Rehab 104  -KL     Pre Treatment Diastolic BP 82  -KL     Post Systolic BP Rehab 103  -KL     Post Treatment Diastolic BP 75  -KL     Pretreatment Heart Rate (beats/min) 82  -KL     Posttreatment Heart Rate (beats/min) 94  -KL     Pre SpO2 (%) 95  -KL     O2 Delivery Pre Treatment room air  -KL     Post SpO2 (%) 96  -KL     O2 Delivery Post Treatment room air  -KL     Pre Patient Position Supine  -KL     Intra Patient Position Standing  -KL     Post Patient Position Sitting  -       Row Name 05/07/24 1347          Positioning and Restraints    Pre-Treatment Position in bed  -KL     Post Treatment Position chair  -KL     In Chair with nsg;reclined;sitting;call light within reach;encouraged to call for assist;exit alarm on;waffle cushion;legs elevated;on mechanical lift sling  -KL               User Key  (r) = Recorded By, (t) = Taken By, (c) = Cosigned By      Initials Name Provider Type    Naye Davila OT Occupational Therapist                   Outcome Measures       Row Name 05/07/24 1349          How much help from another is currently needed...    Putting on and taking off regular lower body clothing? 2  -KL     Bathing (including washing, rinsing, and drying) 2  -KL     Toileting (which includes using toilet bed pan or urinal) 1  -KL     Putting on and taking off regular upper body clothing 2  -KL     Taking care of personal grooming (such as brushing teeth) 2  -KL     Eating meals 3  -KL     AM-PAC 6 Clicks Score (OT) 12  -KL       Row Name 05/07/24 1322          How much help from another person do you currently need...    Turning from your back to your side while in flat bed without using bedrails? 3  -ES     Moving from lying on back to sitting on the side of a flat bed without bedrails? 2  -ES     Moving to and from a bed to a chair (including a wheelchair)? 2  -ES     Standing up from a chair using  your arms (e.g., wheelchair, bedside chair)? 2  -ES     Climbing 3-5 steps with a railing? 1  -ES     To walk in hospital room? 2  -ES     AM-PAC 6 Clicks Score (PT) 12  -ES     Highest Level of Mobility Goal 4 --> Transfer to chair/commode  -ES       Row Name 05/07/24 1322          Functional Assessment    Outcome Measure Options AM-PAC 6 Clicks Basic Mobility (PT)  -ES               User Key  (r) = Recorded By, (t) = Taken By, (c) = Cosigned By      Initials Name Provider Type    ES Theresa Tsang, PT Physical Therapist    Naye Davila, OT Occupational Therapist                    Occupational Therapy Education       Title: PT OT SLP Therapies (In Progress)       Topic: Occupational Therapy (In Progress)       Point: ADL training (Done)       Description:   Instruct learner(s) on proper safety adaptation and remediation techniques during self care or transfers.   Instruct in proper use of assistive devices.                  Learning Progress Summary             Patient Acceptance, E,TB, VU by JESSICA at 5/3/2024 1646    Acceptance, E, NR by REECE at 5/3/2024 1547    Acceptance, E, NR by AMINAH at 4/30/2024 1006    Acceptance, E, VU by  at 4/25/2024 1314    Acceptance, E, VU by AN at 4/23/2024 1344                         Point: Home exercise program (Done)       Description:   Instruct learner(s) on appropriate technique for monitoring, assisting and/or progressing therapeutic exercises/activities.                  Learning Progress Summary             Patient Acceptance, E,TB, VU by JESSICA at 5/3/2024 1646    Acceptance, E, VU by MR at 4/25/2024 1314    Acceptance, E, VU by AN at 4/23/2024 1344                         Point: Precautions (In Progress)       Description:   Instruct learner(s) on prescribed precautions during self-care and functional transfers.                  Learning Progress Summary             Patient Acceptance, E, NR by REECE at 5/7/2024 1350    Acceptance, E,TB, VU by JESSICA at 5/3/2024 1646    Acceptance,  E, NR by KL at 5/3/2024 1547    Acceptance, E, NR by KL at 5/1/2024 1542    Acceptance, E, NR by MC at 4/30/2024 1006    Acceptance, E, NR by KL at 4/29/2024 1131    Acceptance, E, VU by MR at 4/25/2024 1314    Acceptance, E, VU by AN at 4/23/2024 1344   Significant Other Acceptance, E, NR by KL at 4/29/2024 1131                         Point: Body mechanics (In Progress)       Description:   Instruct learner(s) on proper positioning and spine alignment during self-care, functional mobility activities and/or exercises.                  Learning Progress Summary             Patient Acceptance, E, NR by KL at 5/7/2024 1350    Acceptance, E,TB, VU by  at 5/3/2024 1646    Acceptance, E, NR by KL at 5/3/2024 1547    Acceptance, E, NR by KL at 5/1/2024 1542    Acceptance, E, NR by  at 4/30/2024 1006    Acceptance, E, NR by KL at 4/29/2024 1131    Acceptance, E, VU by MR at 4/25/2024 1314    Acceptance, E, VU by AN at 4/23/2024 1344   Significant Other Acceptance, E, NR by KL at 4/29/2024 1131                                         User Key       Initials Effective Dates Name Provider Type Discipline     09/22/22 -  Howard Zhou, RN Registered Nurse Nurse     10/14/22 -  Stacy Sevilla OT Occupational Therapist OT    AN 09/21/21 -  Gin Jaeger OT Occupational Therapist OT    MR 09/22/22 -  Rosalinda Silva OT Occupational Therapist OT     02/05/24 -  Naye Salinas OT Occupational Therapist OT                  OT Recommendation and Plan  Planned Therapy Interventions (OT): activity tolerance training, adaptive equipment training, functional balance retraining, occupation/activity based interventions, patient/caregiver education/training, ROM/therapeutic exercise, transfer/mobility retraining, strengthening exercise  Therapy Frequency (OT): daily  Plan of Care Review  Plan of Care Reviewed With: patient  Progress: no change  Outcome Evaluation: Pt continues to present to OT w/ deficits in functional  endurance and weakness. Pt is dizzy w/ all position changes. Will contiues to benefit from IPOT to prevent debility and address deficits. d/c rec is SNF. Please refer to PT note on equipment rec.     Time Calculation:         Time Calculation- OT       Row Name 05/07/24 1350             Time Calculation- OT    OT Start Time 1023  -KL      OT Received On 05/07/24  -KL         Timed Charges    61062 - OT Therapeutic Activity Minutes 13  -KL      21616 - OT Self Care/Mgmt Minutes 10  -KL         Total Minutes    Timed Charges Total Minutes 23  -KL       Total Minutes 23  -KL                User Key  (r) = Recorded By, (t) = Taken By, (c) = Cosigned By      Initials Name Provider Type    Naey Davila OT Occupational Therapist                  Therapy Charges for Today       Code Description Service Date Service Provider Modifiers Qty    74633334029 HC OT THERAPEUTIC ACT EA 15 MIN 5/7/2024 Naye Salinas OT GO 1    53150875472 HC OT SELF CARE/MGMT/TRAIN EA 15 MIN 5/7/2024 Naye Salinas OT GO 1                 Naye Salinas OT  5/7/2024

## 2024-05-07 NOTE — THERAPY TREATMENT NOTE
Patient Name: Jermaine Singh  : 1945    MRN: [id number]                              Today's Date: 2024       Admit Date: 2024    Visit Dx:     ICD-10-CM ICD-9-CM   1. Acute UTI  N39.0 599.0   2. Generalized weakness  R53.1 780.79   3. Edema of right lower extremity  R60.0 782.3   4. Leg erythema  L53.9 695.9   5. Chronic kidney disease, unspecified CKD stage  N18.9 585.9   6. Elevated troponin  R79.89 790.6   7. Hyperkalemia  E87.5 276.7   8. Cardiac arrest  I46.9 427.5   9. Oropharyngeal dysphagia  R13.12 787.22     Patient Active Problem List   Diagnosis    Type 2 diabetes mellitus with hyperglycemia    Hyperlipidemia LDL goal <70    Hypothyroidism (acquired)    Vitamin D deficiency on Rx     Diabetic neuropathy    Coronary artery disease involving native coronary artery of native heart with angina pectoris    Atherosclerosis of native artery of both lower extremities with intermittent claudication    Pacemaker    HFrEF (heart failure with reduced ejection fraction)    DVT, bilateral lower limbs    Cellulitis of right lower extremity    Anemia, chronic disease    PVD (peripheral vascular disease)    Diabetic foot ulcer    GERD without esophagitis    Stage 3b chronic kidney disease    Right inguinal hernia    PAF (paroxysmal atrial fibrillation)    Moderate malnutrition    UTI (urinary tract infection)    Cardiac arrest    Acute respiratory failure with hypoxia    History of DVT (deep vein thrombosis)    Elevated troponin level not due myocardial infarction     Past Medical History:   Diagnosis Date    Allergic     Arthritis     Asthma     Coronary artery disease     Diabetes mellitus 2000    started on inuslin 2018; started on po meds in ; checking blood sugars daily     Diabetic foot infection 2023    Disease of thyroid gland     po meds daily for hypothyroidism     Elevated serum creatinine 11/15/2022    Elevated troponin 10/02/2022    Generalized weakness 11/15/2022     History of fracture as a child     rt leg- severe     Hyperlipidemia     Hypertension     Hypothyroidism     PAF (paroxysmal atrial fibrillation) 07/23/2023    Peripheral neuropathy     Peripheral vascular disease     s/p angiogram 2/19-needs stent in left leg     Pleural effusion, bilateral 11/15/2022    Proximal phalanx fracture of the second digit extending into the second metatarsal joint 07/28/2022    RBBB     Right knee pain     Status post amputation of great toe and second toe of left foot 07/23/2023    Status post amputation of great toe, left 11/15/2022    Unstable angina 02/12/2019    Added automatically from request for surgery [id number]    Vitamin D deficiency      Past Surgical History:   Procedure Laterality Date    AMPUTATION DIGIT Left 01/17/2023    Procedure: AMPUTATION DIGIT LEFT;  Surgeon: Gopal Márquez MD;  Location:  [facility];  Service: Vascular;  Laterality: Left;    APPENDECTOMY      CARDIAC CATHETERIZATION N/A 02/14/2019    Procedure: Left Heart Cath;  Surgeon: Cooper Apodaca MD;  Location: [facility] CATH INVASIVE LOCATION;  Service: Cardiology    CARDIAC ELECTROPHYSIOLOGY PROCEDURE N/A 05/18/2022    Procedure: DEVICE IMPLANT;  Surgeon: Cooper Apodaca MD;  Location: [facility] CATH INVASIVE LOCATION;  Service: Cardiology;  Laterality: N/A;    CARDIAC SURGERY      COLONOSCOPY      CORONARY ARTERY BYPASS GRAFT N/A 03/22/2019    Procedure: MEDIAN STERNOTOMY, CORONARY ARTERY BYPASS GRAFT X3, UTILIZING THE LEFT INTERNAL MAMMARY ARTERY, EVH AND OPEN HARVEST OF THE RIGHT GREATER SAPHENOUS VEIN, EXPLORATION OF THE LEFT LEG;  Surgeon: Ap Au MD;  Location: [facility] OR;  Service: Cardiothoracic    EYE SURGERY Bilateral     cataracts     FRACTURE SURGERY      INTERVENTIONAL RADIOLOGY PROCEDURE N/A 07/29/2021    Procedure: Abdominal Aortagram with Runoff;  Surgeon: Jermaine Hernandez MD;  Location: [facility] CATH INVASIVE LOCATION;  Service: Cardiovascular;  Laterality: N/A;    KNEE  ARTHROSCOPY      right x 2, left x 1    LACERATION REPAIR      right leg    LEG SURGERY      2 for fracture of rt leg     TONSILLECTOMY      Adnoidectomy    TRANS METATARSAL AMPUTATION Left 08/02/2022    Procedure: GREAT TOE AMPUTATION LEFT;  Surgeon: Gopal Márquez MD;  Location: [facility];  Service: Vascular;  Laterality: Left;      General Information       Row Name 05/07/24 1315          Physical Therapy Time and Intention    Document Type therapy note (daily note)  -ES     Mode of Treatment physical therapy  -ES       Row Name 05/07/24 1315          General Information    Patient Profile Reviewed yes  -ES     Existing Precautions/Restrictions fall;cardiac;oxygen therapy device and L/min  -ES     Barriers to Rehab medically complex  -ES       Row Name 05/07/24 1315          Cognition    Orientation Status (Cognition) oriented x 3  -ES       Row Name 05/07/24 1315          Safety Issues, Functional Mobility    Safety Issues Affecting Function (Mobility) awareness of need for assistance;insight into deficits/self-awareness;safety precaution awareness;safety precautions follow-through/compliance;sequencing abilities  -ES     Impairments Affecting Function (Mobility) balance;endurance/activity tolerance;strength;postural/trunk control;pain;motor control;motor planning;shortness of breath  -ES               User Key  (r) = Recorded By, (t) = Taken By, (c) = Cosigned By      Initials Name Provider Type    ES Theresa Tsang PT Physical Therapist                   Mobility       Row Name 05/07/24 1318          Bed Mobility    Comment, (Bed Mobility) received EOB  -ES       Row Name 05/07/24 1318          Bed-Chair Transfer    Bed-Chair [place] (Transfers) moderate assist (50% patient effort);2 person assist;verbal cues  -ES     Assistive Device (Bed-Chair Transfers) other (see comments)  BUE support  -ES     Comment, (Bed-Chair Transfer) able to take ~5 steps from EOB to chair with modA x2 and BUE support. v/c  for sequencing  -ES       Row Name 05/07/24 1318          Sit-Stand Transfer    Sit-Stand [place] (Transfers) moderate assist (50% patient effort);2 person assist;verbal cues;nonverbal cues (demo/gesture)  -ES     Assistive Device (Sit-Stand Transfers) other (see comments)  BUE support  -ES       Row Name 05/07/24 1318          Gait/Stairs (Locomotion)    [place] Level (Gait) unable to assess  -ES     Comment, (Gait/Stairs) further mobility deferred 2/2 weakness and fatigue  -ES               User Key  (r) = Recorded By, (t) = Taken By, (c) = Cosigned By      Initials Name Provider Type    ES Theresa Tsang PT Physical Therapist                   Obj/Interventions       Row Name 05/07/24 1319          Balance    Balance Assessment sitting static balance;sit to stand dynamic balance;sitting dynamic balance;standing static balance;standing dynamic balance  -ES     Static Sitting Balance minimal assist  -ES     Dynamic Sitting Balance minimal assist;verbal cues  -ES     Position, Sitting Balance supported;sitting in chair;sitting edge of bed  -ES     Sit to Stand Dynamic Balance moderate assist;2-person assist;verbal cues  -ES     Static Standing Balance moderate assist;2-person assist  -ES     Dynamic Standing Balance moderate assist;2-person assist;verbal cues  -ES     Position/Device Used, Standing Balance supported  -ES     Balance Interventions sitting;standing;sit to stand;supported;static;dynamic;occupation based/functional task  -ES               User Key  (r) = Recorded By, (t) = Taken By, (c) = Cosigned By      Initials Name Provider Type    ES Theresa Tsang, SETH Physical Therapist                   Goals/Plan    No documentation.                  Clinical Impression       Row Name 05/07/24 1319          Pain    Pain Intervention(s) Repositioned;Ambulation/increased activity  -ES     Additional Documentation Pain Scale: FACES Pre/Post-Treatment (Group)  -ES       Row Name 05/07/24 1313           Pain Scale: FACES Pre/Post-Treatment    Pain: FACES Scale, Pretreatment 0-->no hurt  -ES     Posttreatment Pain Rating 0-->no hurt  -ES     Pre/Posttreatment Pain Comment tolerated  -ES       Row Name 05/07/24 1319          Plan of Care Review    Plan of Care Reviewed With patient  -ES     Progress no change  -ES     Outcome Evaluation Pt continues to be limited by generalized weakness, decreased activity tolerance, and fatigues quickly with activity. Pt required modA x2 for mobility with cues for sequencing. PT rec SNF at d/c, but if pt chooses to go home with hospice, pt will need a FWW and Sree lift for safe transfers.  -ES       Row Name 05/07/24 1319          Therapy Assessment/Plan (PT)    Rehab Potential (PT) fair, will monitor progress closely  -ES     Criteria for Skilled Interventions Met (PT) yes;meets criteria;skilled treatment is necessary  -ES     Therapy Frequency (PT) daily  -ES       Row Name 05/07/24 1319          Vital Signs    Pre Systolic BP Rehab 104  -ES     Pre Treatment Diastolic BP 82  -ES     Post Systolic BP Rehab 103  -ES     Post Treatment Diastolic BP 75  -ES     Pretreatment Heart Rate (beats/min) 110  -ES     Posttreatment Heart Rate (beats/min) 76  -ES     Pre SpO2 (%) 96  -ES     O2 Delivery Pre Treatment room air  -ES     O2 Delivery Intra Treatment room air  -ES     Post SpO2 (%) 97  -ES     O2 Delivery Post Treatment room air  -ES     Pre Patient Position Supine  -ES     Intra Patient Position Standing  -ES     Post Patient Position Sitting  -ES       Row Name 05/07/24 1319          Positioning and Restraints    Pre-Treatment Position in bed  -ES     Post Treatment Position chair  -ES     In Chair notified nsg;reclined;sitting;call light within reach;encouraged to call for assist;exit alarm on;waffle cushion;legs elevated;on mechanical lift sling;heels elevated  -ES               User Key  (r) = Recorded By, (t) = Taken By, (c) = Cosigned By      Initials Name Provider Type     Theresa Markham PT Physical Therapist                   Outcome Measures       Row Name 05/07/24 1322          How much help from another person do you currently need...    Turning from your back to your side while in flat bed without using bedrails? 3  -ES     Moving from lying on back to sitting on the side of a flat bed without bedrails? 2  -ES     Moving to and from a bed to a chair (including a wheelchair)? 2  -ES     Standing up from a chair using your arms (e.g., wheelchair, bedside chair)? 2  -ES     Climbing 3-5 steps with a railing? 1  -ES     To walk in hospital room? 2  -ES     AM-PAC 6 Clicks Score (PT) 12  -ES     Highest Level of Mobility Goal 4 --> Transfer to chair/commode  -ES       Row Name 05/07/24 1322          Functional Assessment    Outcome Measure Options AM-PAC 6 Clicks Basic Mobility (PT)  -ES               User Key  (r) = Recorded By, (t) = Taken By, (c) = Cosigned By      Initials Name Provider Type    Theresa Markham PT Physical Therapist                                 Physical Therapy Education       Title: PT OT SLP Therapies (In Progress)       Topic: Physical Therapy (In Progress)       Point: Mobility training (In Progress)       Learning Progress Summary             Patient Acceptance, E,TB, VU by  at 5/3/2024 1646    Acceptance, E, NR by  at 5/3/2024 1607    Acceptance, E, NR by  at 5/1/2024 1606    Acceptance, E, NR by  at 4/30/2024 1153    Acceptance, E, NR by  at 4/29/2024 1159    Eager, E, VU,DU,NR by  at 4/24/2024 1452    Comment: Reviewed safety/technique w/transfers, ambulation, HEP, PT POC    Acceptance, E, VU by KR at 4/23/2024 1120   Family Acceptance, E, NR by  at 5/3/2024 1607    Acceptance, E, NR by  at 4/30/2024 1153    Acceptance, E, NR by  at 4/29/2024 1159    Acceptance, E, VU by KR at 4/23/2024 1120                         Point: Home exercise program (In Progress)       Learning Progress Summary             Patient Acceptance, E,TB,  VU by EB at 5/3/2024 1646    Acceptance, E, NR by LH at 5/1/2024 1606    Acceptance, E, NR by LH at 4/30/2024 1153    Eager, E, VU,DU,NR by SS at 4/24/2024 1452    Comment: Reviewed safety/technique w/transfers, ambulation, HEP, PT POC   Family Acceptance, E, NR by LH at 4/30/2024 1153                         Point: Body mechanics (In Progress)       Learning Progress Summary             Patient Acceptance, E,TB, VU by EB at 5/3/2024 1646    Acceptance, E, NR by LH at 5/3/2024 1607    Acceptance, E, NR by LH at 5/1/2024 1606    Acceptance, E, NR by LH at 4/30/2024 1153    Acceptance, E, NR by LH at 4/29/2024 1159    Eager, E, VU,DU,NR by SS at 4/24/2024 1452    Comment: Reviewed safety/technique w/transfers, ambulation, HEP, PT POC    Acceptance, E, VU by KR at 4/23/2024 1120   Family Acceptance, E, NR by  at 5/3/2024 1607    Acceptance, E, NR by  at 4/30/2024 1153    Acceptance, E, NR by LH at 4/29/2024 1159    Acceptance, E, VU by KR at 4/23/2024 1120                         Point: Precautions (In Progress)       Learning Progress Summary             Patient Acceptance, E,TB, VU by EB at 5/3/2024 1646    Acceptance, E, NR by LH at 5/3/2024 1607    Acceptance, E, NR by LH at 5/1/2024 1606    Acceptance, E, NR by LH at 4/30/2024 1153    Acceptance, E, NR by LH at 4/29/2024 1159    Eager, E, VU,DU,NR by SS at 4/24/2024 1452    Comment: Reviewed safety/technique w/transfers, ambulation, HEP, PT POC    Acceptance, E, VU by KR at 4/23/2024 1120   Family Acceptance, E, NR by LH at 5/3/2024 1607    Acceptance, E, NR by LH at 4/30/2024 1153    Acceptance, E, NR by LH at 4/29/2024 1159    Acceptance, E, VU by KR at 4/23/2024 1120                                         User Key       Initials Effective Dates Name Provider Type Discipline    EB 09/22/22 -  Howard Zhou, FRAN Registered Nurse Nurse    SS 06/01/21 -  Jenna Stanley, PT Physical Therapist PT    KR 12/30/22 -  Tiffanie Erwin, PT Physical Therapist PT      09/21/23 -  Bessie Servin, PT Physical Therapist PT                  PT Recommendation and Plan     Plan of Care Reviewed With: patient  Progress: no change  Outcome Evaluation: Pt continues to be limited by generalized weakness, decreased activity tolerance, and fatigues quickly with activity. Pt required modA x2 for mobility with cues for sequencing. PT rec SNF at d/c, but if pt chooses to go home with hospice, pt will need a FWW and Sree lift for safe transfers.     Time Calculation:         PT Charges       Row Name 05/07/24 1322             Time Calculation    Start Time 1000  -ES      PT Received On 05/07/24  -ES      PT Goal Re-Cert Due Date 05/09/24  -ES         Time Calculation- PT    Total Timed Code Minutes- PT 23 minute(s)  -ES         Timed Charges    31771 - PT Therapeutic Activity Minutes 23  -ES         Total Minutes    Timed Charges Total Minutes 23  -ES       Total Minutes 23  -ES                User Key  (r) = Recorded By, (t) = Taken By, (c) = Cosigned By      Initials Name Provider Type     Theresa Tsang PT Physical Therapist                  Therapy Charges for Today       Code Description Service Date Service Provider Modifiers Qty    [id number]  PT THERAPEUTIC ACT EA 15 MIN 5/7/2024 Theresa Tsang PT GP 2            PT G-Codes  Outcome Measure Options: AM-PAC 6 Clicks Basic Mobility (PT)  AM-PAC 6 Clicks Score (PT): 12  AM-PAC 6 Clicks Score (OT): 12  Modified Pershing Scale: 4 - Moderately severe disability.  Unable to walk without assistance, and unable to attend to own bodily needs without assistance.  PT Discharge Summary  Anticipated Discharge Disposition (PT): skilled nursing facility, other (see comments) (vs. home with hospice)    Theresa Tsang PT  5/7/2024

## 2024-05-08 VITALS
DIASTOLIC BLOOD PRESSURE: 62 MMHG | HEIGHT: 75 IN | RESPIRATION RATE: 18 BRPM | HEART RATE: 62 BPM | SYSTOLIC BLOOD PRESSURE: 92 MMHG | TEMPERATURE: 98.2 F | OXYGEN SATURATION: 98 % | BODY MASS INDEX: 23.11 KG/M2 | WEIGHT: 185.85 LBS

## 2024-05-08 PROBLEM — N39.0 UTI (URINARY TRACT INFECTION): Status: RESOLVED | Noted: 2024-04-22 | Resolved: 2024-05-08

## 2024-05-08 PROBLEM — J96.01 ACUTE RESPIRATORY FAILURE WITH HYPOXIA: Status: RESOLVED | Noted: 2024-04-25 | Resolved: 2024-05-08

## 2024-05-08 PROBLEM — R79.89 ELEVATED TROPONIN LEVEL NOT DUE MYOCARDIAL INFARCTION: Status: RESOLVED | Noted: 2024-04-25 | Resolved: 2024-05-08

## 2024-05-08 PROBLEM — I46.9 CARDIAC ARREST: Status: RESOLVED | Noted: 2024-04-25 | Resolved: 2024-05-08

## 2024-05-08 LAB
GLUCOSE BLDC GLUCOMTR-MCNC: 108 MG/DL (ref 70–130)
GLUCOSE BLDC GLUCOMTR-MCNC: 141 MG/DL (ref 70–130)

## 2024-05-08 PROCEDURE — 94799 UNLISTED PULMONARY SVC/PX: CPT

## 2024-05-08 PROCEDURE — 94761 N-INVAS EAR/PLS OXIMETRY MLT: CPT

## 2024-05-08 PROCEDURE — 82948 REAGENT STRIP/BLOOD GLUCOSE: CPT

## 2024-05-08 PROCEDURE — 63710000001 INSULIN LISPRO (HUMAN) PER 5 UNITS: Performed by: INTERNAL MEDICINE

## 2024-05-08 PROCEDURE — 99239 HOSP IP/OBS DSCHRG MGMT >30: CPT | Performed by: INTERNAL MEDICINE

## 2024-05-08 PROCEDURE — 25010000002 FUROSEMIDE PER 20 MG: Performed by: INTERNAL MEDICINE

## 2024-05-08 RX ORDER — ASPIRIN 81 MG/1
81 TABLET, CHEWABLE ORAL DAILY
Qty: 30 TABLET | Refills: 0 | Status: SHIPPED | OUTPATIENT
Start: 2024-05-09 | End: 2024-05-08

## 2024-05-08 RX ORDER — PANTOPRAZOLE SODIUM 40 MG/1
40 TABLET, DELAYED RELEASE ORAL
Qty: 30 TABLET | Refills: 0 | Status: SHIPPED | OUTPATIENT
Start: 2024-05-09

## 2024-05-08 RX ORDER — TAMSULOSIN HYDROCHLORIDE 0.4 MG/1
0.4 CAPSULE ORAL DAILY
Qty: 30 CAPSULE | Refills: 0 | Status: SHIPPED | OUTPATIENT
Start: 2024-05-09

## 2024-05-08 RX ORDER — LEVOTHYROXINE SODIUM 88 UG/1
88 TABLET ORAL
Qty: 30 TABLET | Refills: 0 | Status: SHIPPED | OUTPATIENT
Start: 2024-05-09 | End: 2024-05-08

## 2024-05-08 RX ORDER — FUROSEMIDE 80 MG
80 TABLET ORAL DAILY
Qty: 30 TABLET | Refills: 0 | Status: SHIPPED | OUTPATIENT
Start: 2024-05-08

## 2024-05-08 RX ORDER — FUROSEMIDE 80 MG
80 TABLET ORAL 2 TIMES DAILY
Qty: 30 TABLET | Refills: 0 | Status: SHIPPED | OUTPATIENT
Start: 2024-05-08 | End: 2024-05-08

## 2024-05-08 RX ORDER — ACETAMINOPHEN 325 MG/1
650 TABLET ORAL EVERY 6 HOURS PRN
Qty: 30 TABLET | Refills: 0 | Status: SHIPPED | OUTPATIENT
Start: 2024-05-08 | End: 2024-05-08

## 2024-05-08 RX ORDER — CASTOR OIL AND BALSAM, PERU 788; 87 MG/G; MG/G
1 OINTMENT TOPICAL DAILY
Qty: 28.35 G | Refills: 0 | Status: SHIPPED | OUTPATIENT
Start: 2024-05-09 | End: 2024-05-08

## 2024-05-08 RX ORDER — ACETAMINOPHEN 325 MG/1
650 TABLET ORAL EVERY 6 HOURS PRN
Qty: 30 TABLET | Refills: 0 | Status: SHIPPED | OUTPATIENT
Start: 2024-05-08

## 2024-05-08 RX ORDER — TAMSULOSIN HYDROCHLORIDE 0.4 MG/1
0.4 CAPSULE ORAL DAILY
Qty: 30 CAPSULE | Refills: 0 | Status: SHIPPED | OUTPATIENT
Start: 2024-05-09 | End: 2024-05-08

## 2024-05-08 RX ORDER — HYDROCODONE BITARTRATE AND ACETAMINOPHEN 5; 325 MG/1; MG/1
1 TABLET ORAL EVERY 6 HOURS PRN
Qty: 20 TABLET | Refills: 0 | Status: SHIPPED | OUTPATIENT
Start: 2024-05-08

## 2024-05-08 RX ORDER — LEVOTHYROXINE SODIUM 88 UG/1
88 TABLET ORAL
Qty: 30 TABLET | Refills: 0 | Status: SHIPPED | OUTPATIENT
Start: 2024-05-09

## 2024-05-08 RX ORDER — PANTOPRAZOLE SODIUM 40 MG/1
40 TABLET, DELAYED RELEASE ORAL
Qty: 30 TABLET | Refills: 0 | Status: SHIPPED | OUTPATIENT
Start: 2024-05-09 | End: 2024-05-08

## 2024-05-08 RX ORDER — ASPIRIN 81 MG/1
81 TABLET, CHEWABLE ORAL DAILY
Qty: 30 TABLET | Refills: 0 | Status: SHIPPED | OUTPATIENT
Start: 2024-05-09

## 2024-05-08 RX ORDER — CASTOR OIL AND BALSAM, PERU 788; 87 MG/G; MG/G
1 OINTMENT TOPICAL DAILY
Qty: 28.35 G | Refills: 0 | Status: SHIPPED | OUTPATIENT
Start: 2024-05-09

## 2024-05-08 RX ORDER — FUROSEMIDE 80 MG
80 TABLET ORAL 2 TIMES DAILY
Qty: 30 TABLET | Refills: 0 | Status: SHIPPED | OUTPATIENT
Start: 2024-05-08 | End: 2024-05-08 | Stop reason: HOSPADM

## 2024-05-08 RX ADMIN — ACETAMINOPHEN 650 MG: 325 TABLET ORAL at 15:53

## 2024-05-08 RX ADMIN — HYDROCORTISONE 50 MG: 10 TABLET ORAL at 09:17

## 2024-05-08 RX ADMIN — INSULIN LISPRO 1 UNITS: 100 INJECTION, SOLUTION INTRAVENOUS; SUBCUTANEOUS at 09:20

## 2024-05-08 RX ADMIN — FUROSEMIDE 80 MG: 10 INJECTION, SOLUTION INTRAMUSCULAR; INTRAVENOUS at 09:18

## 2024-05-08 RX ADMIN — MIDODRINE HYDROCHLORIDE 10 MG: 10 TABLET ORAL at 14:23

## 2024-05-08 RX ADMIN — TAMSULOSIN HYDROCHLORIDE 0.4 MG: 0.4 CAPSULE ORAL at 09:18

## 2024-05-08 RX ADMIN — MIDODRINE HYDROCHLORIDE 10 MG: 10 TABLET ORAL at 05:22

## 2024-05-08 RX ADMIN — LEVOTHYROXINE SODIUM 88 MCG: 88 TABLET ORAL at 05:22

## 2024-05-08 RX ADMIN — SENNOSIDES AND DOCUSATE SODIUM 2 TABLET: 8.6; 5 TABLET ORAL at 09:18

## 2024-05-08 RX ADMIN — Medication 1 APPLICATION: at 09:20

## 2024-05-08 RX ADMIN — APIXABAN 2.5 MG: 2.5 TABLET, FILM COATED ORAL at 09:18

## 2024-05-08 RX ADMIN — ASPIRIN 81 MG CHEWABLE TABLET 81 MG: 81 TABLET CHEWABLE at 09:18

## 2024-05-08 RX ADMIN — Medication 10 ML: at 09:17

## 2024-05-08 RX ADMIN — PANTOPRAZOLE SODIUM 40 MG: 40 TABLET, DELAYED RELEASE ORAL at 05:22

## 2024-05-08 NOTE — SIGNIFICANT NOTE
05/08/24 0908   SLP Deferred Reason   SLP Deferred Reason Routine  (Spoke w/ RN- plan for pt to go home w/ hospice today. Cancelled FEES. Will f/u if any further SLP needs. Thank you.)

## 2024-05-08 NOTE — PLAN OF CARE
Problem: Adult Inpatient Plan of Care  Goal: Plan of Care Review  Outcome: Ongoing, Progressing  Flowsheets (Taken 5/8/2024 1721)     Goal Outcome Evaluation:    No c/o nausea or anxiety. V Paced on monitor, added 2LNC d/t desaturation on monitor and patient request. Rhonchi throughout and clears with cough. Afebrile, A+O x4. UOP 250mL via PW and 1 unmeasured, 1 incontinent BM. A+O x 4. Tylenol PO given x 1 for mild pain. Plans to go home with hospice today.

## 2024-05-08 NOTE — PLAN OF CARE
"Goal Outcome Evaluation:  Plan of Care Reviewed With: flora(jay)        Progress: no change  Outcome Evaluation: Palliative RN saw pt on 5/8 at 1037.  Pt. was sitting up in bed on 2LNC.  Asked if he was in pain and pt stated, \"nope, nope, nope\".  Asked about nausea and dyspnea and pt offered same response.  Pt. to discharge home with hospice services this afternoon .  Palliative care to follow until pt. goes home.    1300 Palliative IDT meeting: MARK Sue RN, PN; SAHRA Simmons, APRN; NICK Joseph MDiv, [place] [facility]  ;RIVERA Flores MD.; JASWINDER Antunez RN, [place]; RIVERA Omlos, [facility], [facility]-    After hours, weekends and holidays, contact Palliative Provider by calling [phone].                                       "

## 2024-05-08 NOTE — PROGRESS NOTES
INFECTIOUS DISEASE Progress Note    Jermaine Singh  1945  [id number]    Admission Date: 4/22/2024      Requesting Provider: No ref. provider found  Evaluating Physician: Lincoln Padilla MD    Reason for Consultation:  UTI    History of present illness:    4/23/24: Patient is a 79 y.o. male  with a history of type 2 diabetes mellitus, peripheral neuropathy, stage III chronic kidney disease, bilateral DVTs, paroxysmal atrial fibrillation, sick sinus syndrome status post pacemaker placement, CHF, peripheral vascular disease, and left diabetic foot infection who is seen today for evaluation of UTI.  He had a history of UTI last month with urine cultures and early March growing yeast.  He was apparently treated with an antifungal agent.    Over the last few days he noted the onset of profound weakness and fatigue.  He also noticed some dysuria.  He was brought to the emergency room and found to have pyuria and bacteriuria with leukocytosis.   Urine culture was sent and he was started on ceftriaxone.   His white blood cell count today is 11.8.   He was noted to have a blister over his right fifth metatarsal head and a chronic right pretibial wound.    4/24/24: He remains afebrile.  His urine culture was finalized as negative.  I have asked the laboratory to hold the urine culture for yeast but they did not and it was disposed of last night.   He states that his dysuria is improved today.  Feels better.     4/25/2024:  He remains afebrile.  White blood cell count today is 6.7.  Creatinine is 1.99.   Urinalysis yesterday revealed too numerous to count white blood cells.  He denied dysuria this morning.  After I saw him he suffered a V-fib cardiac arrest and was transferred to the ICU.  He is endotracheally intubated    4/26/24:   His maximum temperature over the last 24 hours is 99.5.  Repeat urine culture from 4/24 is pending.  He remains on ventilatory support.    Creatinine is 2.28.  ALT is  1028.  White  blood cell count is 9.95.  O2 saturation is 100% on an FiO2 of 30%.  Chest x-ray reveals no pulmonary infiltrates. \    Later this morning he was extubated.  He is confused.  He knows his name and his birthdate.  He cannot answer more complex questions.    4/27/2024:  His maximum temperature over the last 24 hours is 99.5.  His left ventricular ejection fraction is less than 20%.  His white blood cell count is 2.37.  ALT is down to 626.   Chest x-ray 4/27 reveals moderate hemoglobin airspace disease.   O2 saturation is 99% on 4 L.   He is on Zosyn therapy.   Later today his oxygen demand increased and he was placed   On BiPAP.     4/28/2024:   He has remained afebrile.   White blood cell count is 10.1. His creatinine is 2.65 ALT is 450.  His oxygen demand is down to 1 liter today. Chest x-ray today reveals interval improvement in the right upper lobe infiltrate.  He is coughing and has some sputum production.  He remains confused and did not recognize his wife and daughter today      4/29/2024: He remains afebrile.  His creatinine is 2.52.  ALT is 320.  White blood cell count is 9.3.    He is now on 1 L with an O2 saturation of 96%.  He is more alert and responsive today.  He is less confused.     4/30/2024: He has remained afebrile.  Creatinine is 2.68.  ALT is 218.   White blood cell count is 9.1.  room air. room air.  His confusion is improved today.  He recognized his wife today.  He denies increased cough and sputum production.  He knows me by name today.    5/1/2024:  He remains afebrile.  His creatinine today is 3.39.   White blood cell count is 8.9. O2 saturation is 97% on room air.      5/2/2024:  He remains afebrile.  His creatinine today is 3.3.  White blood cell count is 10.7.   Urine eosinophil smear was 0. He has continued to require vasopressor therapy.   Echocardiogram from yesterday reveals a 21-25% ejection fraction.   He feels better today.  His daughter indicates that  is cognitive function has  improved and he is no longer confused.    5/3/2024:  Maximum temperature over the last 24 hours is 99.4.  Creatinine is 3.38.    ALT is 106.   white blood cell count is 10.4.  O2 saturation is 94% on room air.  He denies increased dyspnea.  He is still requiring low-dose vasopressor support.    5/4/24: History reviewed.  I am following the patient for Dr. Lincoln Padilla over the weekend. The patient denies any worsening shortness of breath.  Breathing is nonlabored on room air.  He has not had any fevers.  He is now off of antibiotics since 5/1.  Creatinine was downtrending to 2.95, from 3.38.  White blood count is normal at 9.42.  He denies any nausea, vomiting, or diarrhea.  He denies peripheral edema.  Right lower extremity with Unna boot in place.    5/5/24: Patient had a temperature up to 100.2°F today.  No shaking chills.  Fonseca catheter remains in place.  No abdominal pain.  No worsening shortness of breath or productive cough.  No new rashes reported.  No increased leg pain.  He denies any diarrhea.    5/6/2024: His maximum temperature over the last 24 hours is 100.2.  He had a low-grade fever to 99.7 this morning.  Chest x-ray reveals mild left basilar atelectasis.  Creatinine is 2.83.  White blood cell count is 7.9.  ProBNP is greater than 70,000.  He denies increased dyspnea, cough, sputum production.  He still has a Fonseca catheter in place    5/7/2024: Maximum temperature over the last 24 hours is 99.7.  Creatinine is 2.84.  White blood cell count is 10.6.   He is being evaluated by hospice.   His Fonseca catheter was removed yesterday.He denies increased cough and dyspnea.    5/8/24: He is scheduled to discharge on home hospice.   He has been afebrile over the last 24 hours.  He denies increased dyspnea and cough.  He is scheduled to go home on hospice this afternoon.    Past Medical History:   Diagnosis Date    Allergic     Arthritis     Asthma     Coronary artery disease     Diabetes mellitus 2000     started on inuslin 12/2018; started on po meds in 2000; checking blood sugars daily     Diabetic foot infection 07/23/2023    Disease of thyroid gland     po meds daily for hypothyroidism     Elevated serum creatinine 11/15/2022    Elevated troponin 10/02/2022    Generalized weakness 11/15/2022    History of fracture as a child     rt leg- severe     Hyperlipidemia     Hypertension     Hypothyroidism     PAF (paroxysmal atrial fibrillation) 07/23/2023    Peripheral neuropathy     Peripheral vascular disease     s/p angiogram 2/19-needs stent in left leg     Pleural effusion, bilateral 11/15/2022    Proximal phalanx fracture of the second digit extending into the second metatarsal joint 07/28/2022    RBBB     Right knee pain     Status post amputation of great toe and second toe of left foot 07/23/2023    Status post amputation of great toe, left 11/15/2022    Unstable angina 02/12/2019    Added automatically from request for surgery [id number]    Vitamin D deficiency        Past Surgical History:   Procedure Laterality Date    AMPUTATION DIGIT Left 01/17/2023    Procedure: AMPUTATION DIGIT LEFT;  Surgeon: Gopal Márquez MD;  Location:  [facility];  Service: Vascular;  Laterality: Left;    APPENDECTOMY      CARDIAC CATHETERIZATION N/A 02/14/2019    Procedure: Left Heart Cath;  Surgeon: Cooper Apodaca MD;  Location: [facility] CATH INVASIVE LOCATION;  Service: Cardiology    CARDIAC ELECTROPHYSIOLOGY PROCEDURE N/A 05/18/2022    Procedure: DEVICE IMPLANT;  Surgeon: Cooper Apodaca MD;  Location:  [facility] CATH INVASIVE LOCATION;  Service: Cardiology;  Laterality: N/A;    CARDIAC SURGERY      COLONOSCOPY      CORONARY ARTERY BYPASS GRAFT N/A 03/22/2019    Procedure: MEDIAN STERNOTOMY, CORONARY ARTERY BYPASS GRAFT X3, UTILIZING THE LEFT INTERNAL MAMMARY ARTERY, EVH AND OPEN HARVEST OF THE RIGHT GREATER SAPHENOUS VEIN, EXPLORATION OF THE LEFT LEG;  Surgeon: Ap Au MD;  Location:  [facility];  Service:  Cardiothoracic    EYE SURGERY Bilateral     cataracts     FRACTURE SURGERY      INTERVENTIONAL RADIOLOGY PROCEDURE N/A 07/29/2021    Procedure: Abdominal Aortagram with Runoff;  Surgeon: Jermaine Hernandez MD;  Location:  [facility] IN[facility]ASIVE LOCATION;  Service: Cardiovascular;  Laterality: N/A;    KNEE ARTHROSCOPY      right x 2, left x 1    LACERATION REPAIR      right leg    LEG SURGERY      2 for fracture of rt leg     TONSILLECTOMY      Adnoidectomy    TRANS METATARSAL AMPUTATION Left 08/02/2022    Procedure: GREAT TOE AMPUTATION LEFT;  Surgeon: Gopal Márquez MD;  Location:  [facility];  Service: Vascular;  Laterality: Left;       Family History   Problem Relation Age of Onset    Coronary artery disease Mother     Diabetes Mother     Hyperlipidemia Mother     Cancer Father        Social History     Socioeconomic History    Marital status:     Number of children: 2   Tobacco Use    Smoking status: Never     Passive exposure: Past    Smokeless tobacco: Never   Vaping Use    Vaping status: Never Used   Substance and Sexual Activity    Alcohol use: Not Currently     Comment: every 2-3 months    Drug use: No    Sexual activity: Not Currently     Partners: Female       Allergies   Allergen Reactions    Vancomycin Other (See Comments)     Kidney failure per last admission         Medication:    Current Facility-Administered Medications:     acetaminophen (TYLENOL) tablet 650 mg, 650 mg, Oral, Q6H PRN, Maricel Peraza, APRN, 650 mg at 05/07/24 2045    albuterol (PROVENTIL) nebulizer solution 0.083% 2.5 mg/3mL, 2.5 mg, Nebulization, BID PRN, Nathalia Mckeon DNP, APRN, 2.5 mg at 04/30/24 0755    apixaban (ELIQUIS) tablet 2.5 mg, 2.5 mg, Oral, Q12H, Antonio Angeles MD, 2.5 mg at 05/07/24 2046    aspirin chewable tablet 81 mg, 81 mg, Oral, Daily, Maricel Peraza, APRN, 81 mg at 05/07/24 0825    atorvastatin (LIPITOR) tablet 20 mg, 20 mg, Oral, Nightly, Golden Madison MD, 20 mg at 05/07/24 2046     castor oil-balsam peru (VENELEX) ointment 1 Application, 1 Application, Topical, Daily, Eyad Ledesma MD, 1 Application at 05/07/24 0826    dextrose (D50W) (25 g/50 mL) IV injection 10-50 mL, 10-50 mL, Intravenous, Q15 Min PRN, Jose Ramon Murphy MD    dextrose (GLUTOSE) oral gel 15 g, 15 g, Oral, Q15 Min PRN, Jose Ramon Murphy MD    furosemide (LASIX) injection 80 mg, 80 mg, Intravenous, Daily, Blake Tilley MD, 80 mg at 05/07/24 0825    glucagon (GLUCAGEN) injection 1 mg, 1 mg, Intramuscular, Q15 Min PRN, Jose Ramon Murphy MD    hydrocortisone (CORTEF) tablet 50 mg, 50 mg, Oral, BID With Meals, Fran Flores MD    insulin glargine (LANTUS, SEMGLEE) injection 1-200 Units, 1-200 Units, Subcutaneous, Nightly - Glucommander, Jose Ramon Murphy MD, 18 Units at 05/07/24 2046    insulin lispro (humaLOG) injection 1-200 Units, 1-200 Units, Subcutaneous, 4x Daily With Meals & Nightly, Jose Ramon Murphy MD, 3 Units at 05/07/24 1156    insulin lispro (humaLOG) injection 1-200 Units, 1-200 Units, Subcutaneous, PRN, Jose Ramon Murphy MD, 4 Units at 05/05/24 1751    levothyroxine (SYNTHROID, LEVOTHROID) tablet 88 mcg, 88 mcg, Oral, Q AM, Maricel Peraza APRN, 88 mcg at 05/08/24 0522    Magnesium Low Dose Replacement - Follow Nurse / BPA Driven Protocol, , Does not apply, PRN, Maricel Peraza APRN    midodrine (PROAMATINE) tablet 10 mg, 10 mg, Oral, TID AC, Golden Madison MD, 10 mg at 05/08/24 0522    nitroglycerin (NITROSTAT) SL tablet 0.4 mg, 0.4 mg, Sublingual, Q5 Min PRN, Maricel Peraza APRN    pantoprazole (PROTONIX) EC tablet 40 mg, 40 mg, Oral, Q AM, Antonio Angeles MD, 40 mg at 05/08/24 0522    polyethylene glycol (MIRALAX) packet 17 g, 17 g, Oral, Daily, Artur Avila MD, 17 g at 05/02/24 1037    prochlorperazine (COMPAZINE) injection 5 mg, 5 mg, Intravenous, Q6H PRN, Maricel Peraza, APRN, 2.5 mg at 04/26/24 1316    sennosides-docusate (PERICOLACE) 8.6-50 MG per tablet 2 tablet, 2  tablet, Oral, BID, 2 tablet at 24 **AND** [DISCONTINUED] polyethylene glycol (MIRALAX) packet 17 g, 17 g, Oral, Daily PRN, 17 g at 24 **AND** [DISCONTINUED] bisacodyl (DULCOLAX) EC tablet 5 mg, 5 mg, Oral, Daily PRN, 5 mg at 24 0956 **AND** [DISCONTINUED] bisacodyl (DULCOLAX) suppository 10 mg, 10 mg, Rectal, Daily PRN, Antonio Angeles MD    sodium chloride 0.9 % flush 10 mL, 10 mL, Intravenous, PRN, Case, Hailee V., DO    sodium chloride 0.9 % flush 10 mL, 10 mL, Intravenous, Q12H, Case, Hailee V., DO, 10 mL at 24    tamsulosin (FLOMAX) 24 hr capsule 0.4 mg, 0.4 mg, Oral, Daily, Maricel Peraza APRN, 0.4 mg at 24 0825    Antibiotics:  Anti-Infectives (From admission, onward)      Ordered     Dose/Rate Route Frequency Start Stop    24 1356  piperacillin-tazobactam (ZOSYN) 3.375 g IVPB in 100 mL NS MBP (CD)        Ordering Provider: Eyad Ledesma MD    3.375 g  over 30 Minutes Intravenous Once 24 1445 24 1557              Review of Systems:  See HPI      Physical Exam:   Vital Signs  Temp (24hrs), Av.6 °F (36.4 °C), Min:96.5 °F (35.8 °C), Max:98.1 °F (36.7 °C)    Temp  Min: 96.5 °F (35.8 °C)  Max: 98.1 °F (36.7 °C)  BP  Min: 69/23  Max: 164/118  Pulse  Min: 60  Max: 101  Resp  Min: 16  Max: 24  SpO2  Min: 76 %  Max: 99 %    GENERAL:   Alert and responsive. Sitting up in bed  HEENT: Normocephalic, atraumatic.  No external oral lesions noted  NECK: Supple   HEART:  irregular rate, No murmur   LUNGS:   Diminished in the lung bases.  Nonlabored breathing on room air  ABDOMEN: Soft, nontender, nondistended. No rebound or guarding. NO mass or HSM.  EXT: Right lower extremity with Unna boot in place.    Photos of his right pretibial area yesterday revealed decreased erythema.  :   Fonseca catheter is out  MSK: No joint effusions or erythema  SKIN: No generalized rashes noted.  No peripheral stigmata of infective endocarditis noted.  NEURO:   "Awake alert and oriented.  Normal speech and cognition.  Right upper extremity PICC line site is without any erythema    Laboratory Data    Results from last 7 days   Lab Units 05/07/24  0538 05/06/24  0400 05/05/24  0523   WBC 10*3/mm3 10.63 7.91 6.61   HEMOGLOBIN g/dL 8.8* 8.4* 9.0*   HEMATOCRIT % 27.4* 26.1* 27.6*   PLATELETS 10*3/mm3 223 207 187     Results from last 7 days   Lab Units 05/07/24  0538   SODIUM mmol/L 136   POTASSIUM mmol/L 4.4   CHLORIDE mmol/L 104   CO2 mmol/L 20.0*   BUN mg/dL 79*   CREATININE mg/dL 2.84*   GLUCOSE mg/dL 187*   CALCIUM mg/dL 8.2*     Results from last 7 days   Lab Units 05/05/24  0523   ALK PHOS U/L 75   BILIRUBIN mg/dL 0.5   ALT (SGPT) U/L 65*   AST (SGOT) U/L 11                         Estimated Creatinine Clearance: 26 mL/min (A) (by C-G formula based on SCr of 2.84 mg/dL (H)).      Microbiology:  No results found for: \"ACANTHNAEG\", \"AFBCX\", \"BPERTUSSISCX\", \"BLOODCX\"  No results found for: \"BCIDPCR\", \"CXREFLEX\", \"CSFCX\", \"CULTURETIS\"  No results found for: \"CULTURES\", \"HSVCX\", \"URCX\"  No results found for: \"EYECULTURE\", \"GCCX\", \"HSVCULTURE\", \"LABHSV\"  No results found for: \"LEGIONELLA\", \"MRSACX\", \"MUMPSCX\", \"MYCOPLASCX\"  No results found for: \"NOCARDIACX\", \"STOOLCX\"  Urine Culture   Date Value Ref Range Status   04/22/2024 No growth  Preliminary     No results found for: \"VIRALCULTU\", \"WOUNDCX\"        Radiology:  Imaging Results (Last 72 Hours)       Procedure Component Value Units Date/Time    XR Chest 1 View [[id number]] Collected: 05/06/24 0715     Updated: 05/06/24 0720    Narrative:      XR CHEST 1 VW    Date of Exam: 5/6/2024 3:02 AM EDT    Indication: Heart Failure    Comparison: 4/28/2024    Findings:  Patient is status post median sternotomy and CABG. Left chest wall pacemaker is present. There is a right arm PICC which terminates overlying the SVC. There is a small left pleural effusion with left basilar atelectasis. Lungs appear otherwise adequately   aerated. " Cardiac silhouette is magnified. No obvious pneumothorax. Osseous structures are unchanged.          Impression:      Impression:  1.Possible small left pleural effusion. Mild left basilar atelectasis.      Electronically Signed: Lucio Lanier MD    5/6/2024 7:17 AM EDT    Workstation ID: [id number]        I read his chest x-ray of 5/6      Impression:    1.    Right upper lobe aspiration pneumonia-he is S/P Zosyn therapy.  He is clinically and radiographically improved.  Zosyn was discontinued on 5/1   2.  V-fib arrest-status post resuscitation.    3.   Acute kidney injury-superimposed on stage IIIb chronic kidney disease.   4.  Chronic right pretibial dermatitis-without evidence of cellulitis  5.  Type 2 diabetes mellitus  6.  coronary artery disease/status post CABG, 3/22/2019  7.  Severe systolic heart failure- with some improvement on his echocardiogram of 5/1.  8.  Stage IIIb chronic kidney disease  9.  Paroxysmal atrial fibrillation  10.  Acute hypoxic respiratory failure- improved   11.  Ischemic hepatitis- improved  12.  Pyuria- with a negative urine culture.  13.  Anoxic encephalopathy- improved  14.  Right fifth metatarsal wound/bullous lesion  15.  Fever-low-grade.  This may be secondary to atelectasis.     PLAN/RECOMMENDATIONS:   1.  Continue with a right leg Unna boot  2.  Continue off of antibiotic therapy.   3.  Discharge on hospice at home today      Lincoln Padilla MD  5/8/2024  06:54 EDT

## 2024-05-08 NOTE — DISCHARGE SUMMARY
Discharge Summary    Patient name: Jermaine Singh  CSN: [id number]  MRN: [id number]  : 1945  Today's date: 2024     Date of Admission: 2024  Date of Discharge:  2024    Admitting Physician:  Dr. Fran Flores MD; Pulmonology  Primary Care Provider: Marysol Shrestha MD  Consultations:  Dr. Sage Soto MD; Urology     Dr. Leonel Michelle MD; Cardiology     Dr. Lincoln Padilla MD; Infectious Disease     Dr. Blake Tilley MD; Nephrology     Dr. eLxi Flores MD; Palliative Care    Admission Diagnosis: Cardiac arrest     Discharge Diagnoses:   Active Hospital Problems    Diagnosis     Anemia, chronic disease     Stage 3b chronic kidney disease     HFrEF (heart failure with reduced ejection fraction)     Pacemaker     Type 2 diabetes mellitus with hyperglycemia     Hyperlipidemia LDL goal <70     Hypothyroidism (acquired)     Coronary artery disease involving native coronary artery of native heart with angina pectoris        Allergies:  Vancomycin    Code Status and Medical Interventions:   Ordered at: 24 1353     Medical Intervention Limits:    NO intubation (DNI)    NO cardioversion     Level Of Support Discussed With:    Next of Kin (If No Surrogate)    Health Care Surrogate     Code Status (Patient has no pulse and is not breathing):    No CPR (Do Not Attempt to Resuscitate)     Medical Interventions (Patient has pulse or is breathing):    Limited Support       Procedures/Testing:  Insert Central Line at Bedside, 24  Insert Arterial Line, 24     History of Present Illness:  Jermaine Singh is a 79 y.o. male history of T2DM, PAF, SSS status post PPM placement, CHF, PVD, left diabetic foot infection, bilateral DVTs, on Eliquis, who presented to [facility] on 2024 with weakness and chronic right lower extremity wound.  UA was consistent with UTI.  He was started on Rocephin and admitted to the hospitalist service.     On 2024, he suffered a V-fib arrest and was intubated  "during CPR.  He was transferred to the intensive care unit for further care.    Hospital Course:  Patient was able to be extubated on 4/26/24. Given his tenuous respiratory status, his family made him a DNR/DNI. This was also discussed with the patient who was awake and alert during his hospitalization post-extubation. Infectious disease was consulted to manage antimicrobial therapy. Cardiology was consulted for management of newly diagnosed heart failure with LVEF of <20%. Patient was not a candidate for LVAD. Inotrope therapy at home was discussed. Nephrology was consulted for [place] on CKD. Given his recent cardiac arrest and severe cardiomyopathy, palliative care was consulted on 5/3/24 and spoke with the patient and his family. Hospice held a meeting with the patient and family on 5/6/24 and elected to take the patient home under Hospice care. As of 5/8/24, Hospice made arrangements for family to be discharged home. Instructions below.    Vitals:  BP 92/62   Pulse 71   Temp 98.2 °F (36.8 °C) (Oral)   Resp 18   Ht 190 cm (74.8\")   Wt 84.3 kg (185 lb 13.6 oz)   SpO2 98%   BMI 23.35 kg/m²     Constitutional:    Alert, in no acute distress   Head:    Normocephalic, atraumatic   Eyes:            Lids and lashes normal, conjunctivae and sclerae normal.  PER   ENMT:   Ears appear intact with no abnormalities noted       Lips normal.     Neck:   Trachea midline, no JVD   Lungs/Resp:     Normal effort, symmetric chest rise, no crepitus, minimal rales and slightly diminished breath sounds at bases.          Heart/CV:    Regular rhythm and normal rate, no murmur   Abdomen/GI:     Soft, nontender, nondistended   :     Deferred   Extremities/MSK:   No clubbing or cyanosis.  Trace bilateral lower extremity   Pulses:   Pulses palpable and equal bilaterally   Skin:   No bleeding, bruising or rash   Heme/Lymph:   No cervical or supraclavicular adenopathy.   Neurologic:     Psychiatric:      Moves all extremities with no " obvious focal motor deficit.  Cranial nerves 2 - 12 grossly intact  Non-agitated, normal affect.    Labs:  Results from last 7 days   Lab Units 05/07/24  0538   WBC 10*3/mm3 10.63   HEMOGLOBIN g/dL 8.8*   HEMATOCRIT % 27.4*   PLATELETS 10*3/mm3 223     Results from last 7 days   Lab Units 05/07/24  0538 05/06/24  0400 05/05/24  0523   SODIUM mmol/L 136   < > 142   POTASSIUM mmol/L 4.4   < > 4.1   CHLORIDE mmol/L 104   < > 109*   CO2 mmol/L 20.0*   < > 18.0*   BUN mg/dL 79*   < > 71*   CREATININE mg/dL 2.84*   < > 2.81*   CALCIUM mg/dL 8.2*   < > 8.2*   BILIRUBIN mg/dL  --   --  0.5   ALK PHOS U/L  --   --  75   ALT (SGPT) U/L  --   --  65*   AST (SGOT) U/L  --   --  11   GLUCOSE mg/dL 187*   < > 159*    < > = values in this interval not displayed.         Magnesium   Date Value Ref Range Status   05/07/2024 2.6 (H) 1.6 - 2.4 mg/dL Final     Phosphorus   Date Value Ref Range Status   05/07/2024 3.9 2.5 - 4.5 mg/dL Final   05/06/2024 4.6 (H) 2.5 - 4.5 mg/dL Final                    Discharge Medications        New Medications        Instructions Start Date   castor oil-balsam peru ointment   1 Application, Topical, Daily   Start Date: May 9, 2024     furosemide 80 MG tablet  Commonly known as: Lasix   80 mg, Oral, 2 Times Daily      HYDROcodone-acetaminophen 5-325 MG per tablet  Commonly known as: NORCO   1 tablet, Oral, Every 6 Hours PRN      pantoprazole 40 MG EC tablet  Commonly known as: PROTONIX   40 mg, Oral, Every Early Morning   Start Date: May 9, 2024     tamsulosin 0.4 MG capsule 24 hr capsule  Commonly known as: FLOMAX   0.4 mg, Oral, Daily   Start Date: May 9, 2024            Changes to Medications        Instructions Start Date   apixaban 2.5 MG tablet tablet  Commonly known as: ELIQUIS  What changed:   medication strength  how much to take   2.5 mg, Oral, Every 12 Hours Scheduled             Continue These Medications        Instructions Start Date   acetaminophen 325 MG tablet  Commonly known as:  TYLENOL   650 mg, Oral, Every 6 Hours PRN      aspirin 81 MG chewable tablet   81 mg, Oral, Daily   Start Date: May 9, 2024     azelastine 0.1 % nasal spray  Commonly known as: ASTELIN   2 sprays, Nasal, 2 Times Daily, Use in each nostril as directed      Diclofenac Sodium 1 % gel gel  Commonly known as: VOLTAREN   2 g, Topical, 4 Times Daily PRN      levothyroxine 88 MCG tablet  Commonly known as: SYNTHROID, LEVOTHROID   88 mcg, Oral, Every Early Morning   Start Date: May 9, 2024            Stop These Medications      Align 4 MG capsule     atorvastatin 20 MG tablet  Commonly known as: LIPITOR     bumetanide 1 MG tablet  Commonly known as: BUMEX     carvedilol 3.125 MG tablet  Commonly known as: COREG     famotidine 20 MG tablet  Commonly known as: PEPCID     fluocinonide 0.05 % cream  Commonly known as: LIDEX     Gemtesa 75 MG tablet  Generic drug: Vibegron     glucose blood test strip  Commonly known as: OneTouch Verio     Hydrocortisone (Perianal) 2.5 % rectal cream  Commonly known as: ANUSOL-HC     insulin detemir 100 UNIT/ML injection  Commonly known as: LEVEMIR     Insulin Lispro 100 UNIT/ML injection  Commonly known as: humaLOG     isosorbide mononitrate 30 MG 24 hr tablet  Commonly known as: IMDUR     linaclotide 290 MCG capsule capsule  Commonly known as: Linzess     losartan 100 MG tablet  Commonly known as: COZAAR     mupirocin 2 % nasal ointment  Commonly known as: BACTROBAN     nitroglycerin 0.4 MG SL tablet  Commonly known as: NITROSTAT     nystatin 514036 UNIT/GM powder  Commonly known as: MYCOSTATIN     vitamin D3 125 MCG (5000 UT) tablet tablet     Xiidra 5 % ophthalmic solution  Generic drug: Lifitegrast              Diet Instructions       Diet: Cardiac Diets, Diabetic Diets; Healthy Heart (2-3 Na+); Regular (IDDSI 7); Nectar Thick; Consistent Carbohydrate      Discharge Diet:  Cardiac Diets  Diabetic Diets       Cardiac Diet: Healthy Heart (2-3 Na+)    Texture: Regular (IDDSI 7)    Fluid  Consistency: Nectar Thick    Diabetic Diet: Consistent Carbohydrate        Follow-up Appointments  Future Appointments   Date Time Provider Department Center   5/8/2024  3:45 PM [facility]   7/24/2024 12:30 PM Marysol Shrestha MD [facility]   1/27/2025  2:30 PM Marysol Shrestha MD [facility]       Discharge Instructions:  Discharge home with Hospice today  Medications per Hospice; patient wishes to be discharged on home medications for now  Transportation per Hospice    Mary Beth Rodriguez, MSN, APRN, ACN-AG  Pulmonary and Critical Care Medicine  Electronically signed by DAVID Adam, 05/08/24, 12:56 PM EDT.     I performed an independent history and physical examination. Portions of the history were obtained by APRN and were modified by me according to my findings. The above note reflects my findings, assessment, and plan.    Fran Flores MD    Time: I spent 35 minutes on this discharge activity which included: face-to-face encounter with the patient, reviewing the data in the system, coordination of the care with the nursing staff as well as consultants, documentation, and entering orders.      See my discharge day progress note for additional details in my physical examination findings.  Electronically signed by:  Fran Flores MD  05/08/24  16:08 EDT      CC: Marysol Shrestha MD

## 2024-05-08 NOTE — PROGRESS NOTES
Pulmonary/Critical Care Follow-up     LOS: 15 days   Patient Care Team:  Maryslo Shrestha MD as PCP - General (Internal Medicine)  Cooper Apodaca MD as Consulting Physician (Cardiology)    Chief Complaint/reason: Cardiac arrest      Subjective     Interval History:   Patient is doing well.  No specific complaints.  He is looking forward to going home today and spending time with family/pets.  No fevers or chills.  Denies dyspnea.  Remains off of pressors with adequate blood pressure.      History taken from: Patient, staff    PMH/FH/Social History were reviewed and updated appropriately in the electronic medical record.     Review of Systems:    Review of 14 systems was completed with positives and pertinent negatives noted in the subjective section.  All other systems reviewed and are negative.         Objective     Vital Signs  Temp:  [96.5 °F (35.8 °C)-98.2 °F (36.8 °C)] 98.2 °F (36.8 °C)  Heart Rate:  [60-95] 62  Resp:  [16-24] 18  BP: ()/(22-87) 92/62  05/07 0701 - 05/08 0700  In: 240 [P.O.:240]  Out: 800 [Urine:800]  Body mass index is 23.35 kg/m².     IV drips:        Physical Exam:     Constitutional:   Alert, in no acute distress   Head:   Normocephalic, atraumatic   Eyes:           Lids and lashes normal, conjunctivae and sclerae normal.  PER   ENMT:  Ears appear intact with no abnormalities noted     Lips normal.     Neck:  Trachea midline, no JVD   Lungs/Resp:    Normal effort, symmetric chest rise, no crepitus, minimal rales and slightly diminished breath sounds at bases.          Heart/CV:   Regular rhythm and normal rate, no murmur   Abdomen/GI:    Soft, nontender, nondistended   :    Deferred   Extremities/MSK:  No clubbing or cyanosis.  Trace bilateral lower extremity   Pulses:  Pulses palpable and equal bilaterally   Skin:  No bleeding, bruising or rash   Heme/Lymph:  No cervical or supraclavicular adenopathy.   Neurologic:    Psychiatric:    Moves all extremities with no obvious  focal motor deficit.  Cranial nerves 2 - 12 grossly intact  Non-agitated, normal affect.    The above physical exam findings were reviewed and reflect my exam findings as of today's exam.   Electronically signed by:  Fran Flores MD  05/08/24  16:04 EDT      Results Review:     I reviewed the patient's new clinical results.   Results from last 7 days   Lab Units 05/07/24  0538 05/06/24 0400 05/05/24 0523 05/04/24 0528 05/03/24  0441   SODIUM mmol/L 136 137 142 142 140   POTASSIUM mmol/L 4.4 4.3 4.1 4.0 4.2   CHLORIDE mmol/L 104 104 109* 110* 109*   CO2 mmol/L 20.0* 20.0* 18.0* 20.0* 18.0*   BUN mg/dL 79* 77* 71* 72* 79*   CREATININE mg/dL 2.84* 2.83* 2.81* 2.95* 3.38*   CALCIUM mg/dL 8.2* 8.1* 8.2* 8.0* 7.9*   BILIRUBIN mg/dL  --   --  0.5 0.4 0.4   ALK PHOS U/L  --   --  75 77 74   ALT (SGPT) U/L  --   --  65* 84* 106*   AST (SGOT) U/L  --   --  11 12 13   GLUCOSE mg/dL 187* 252* 159* 104* 110*     Results from last 7 days   Lab Units 05/07/24  0538 05/06/24  0400 05/05/24  0523   WBC 10*3/mm3 10.63 7.91 6.61   HEMOGLOBIN g/dL 8.8* 8.4* 9.0*   HEMATOCRIT % 27.4* 26.1* 27.6*   PLATELETS 10*3/mm3 223 207 187         Results from last 7 days   Lab Units 05/07/24  0538 05/06/24  0400 05/04/24  0528 05/02/24  0433   MAGNESIUM mg/dL 2.6*  --  2.5* 2.8*   PHOSPHORUS mg/dL 3.9 4.6* 4.5 4.9*     proBNP 5/6/2024: Greater than 70,000    I reviewed the patient's new imaging including images and reports.    Chest x-ray 5/6/2024 shows probable small bilateral pleural effusions and borderline cardiomegaly with otherwise no acute radiographic chest abnormality.    Medication Review:   apixaban, 2.5 mg, Oral, Q12H  aspirin, 81 mg, Oral, Daily  atorvastatin, 20 mg, Oral, Nightly  castor oil-balsam peru, 1 Application, Topical, Daily  furosemide, 80 mg, Intravenous, Daily  hydrocortisone, 50 mg, Oral, BID With Meals  insulin glargine, 1-200 Units, Subcutaneous, Nightly - Glucommander  insulin lispro, 1-200 Units,  Subcutaneous, 4x Daily With Meals & Nightly  levothyroxine, 88 mcg, Oral, Q AM  midodrine, 10 mg, Oral, TID AC  pantoprazole, 40 mg, Oral, Q AM  polyethylene glycol, 17 g, Oral, Daily  senna-docusate sodium, 2 tablet, Oral, BID  sodium chloride, 10 mL, Intravenous, Q12H  tamsulosin, 0.4 mg, Oral, Daily             Assessment & Plan         Type 2 diabetes mellitus with hyperglycemia    Hyperlipidemia LDL goal <70    Hypothyroidism (acquired)    Coronary artery disease involving native coronary artery of native heart with angina pectoris    Pacemaker    HFrEF (heart failure with reduced ejection fraction)    Anemia, chronic disease    Stage 3b chronic kidney disease    79 y.o. with history of T2DM, PAF, SSS status post PPM placement, CHF, PVD, left diabetic foot infection, bilateral DVTs, on Eliquis, who presented to [facility] on 4/22/2024 with weakness and chronic right lower extremity wound.  UA was consistent with UTI.  He was started on Rocephin and admitted to the hospitalist service.    On 4/25/2024, he suffered a V-fib arrest and was intubated during CPR.  He was transferred to the intensive care unit.  He was able to be extubated on 4/26/2024.  After extubation, he had a tenuous respiratory status and family made him a DNR/DNI.  Patient was seen by infectious disease who managed antibiotics.  Patient has had ischemic hepatitis which has been improving.  He has required low-dose norepinephrine and midodrine.  LVEF 21 to 25%.    Plan:  1.  For hypotension: Multifactorial, cardiac with possible adrenal insufficiency.  Initiated on stress dose steroids with hydrocortisone and fludrocortisone on 5/4/2024.  Off of fludrocortisone and decreased hydrocortisone to 50 mg IV every 8 hours on 5/6/2024.  I subsequently decreased this to 50 mg p.o. every 12 hours.  Continue midodrine.  Plan to discharge with hospice today.  2.  For T2DM: Insulin per Glucomander.  3.  For hypothyroidism: Levothyroxine.  4.  For PAF/history of  DVT: Eliquis.  5.  For BPH: Tamsulosin.  6.  Okay to telemetry/hospitalist.  7.  Goals of care: Home with hospice today.  Discontinue PICC line.  8.  For congestive heart failure: LVEF 21 to 25%.  Ongoing diuresis per      35 minutes spent on discharge.    Electronically signed by:    Fran Flores MD  05/08/24  16:04 EDT      *. Please note that portions of this note were completed with [email or web address] - a voice recognition program.

## 2024-05-08 NOTE — DISCHARGE PLACEMENT REQUEST
"Jermaine Singh \"Aid\" (79 y.o. Male)    Discharge Summary   Date of Birth   1945    Social Security Number       Address   [address] [facility] [address]    Home Phone   [phone]    MRN   [id number]       Voodoo   None    Marital Status                               Admission Date   4/22/24    Admission Type   Emergency    Admitting Provider   Jose Ramon Murphy MD    Attending Provider   Fran Flores MD    Department, Room/Bed   [facility] 2A ICU, N205/1       Discharge Date       Discharge Disposition   Hospice/Home    Discharge Destination                                 Attending Provider: Fran Flores MD    Allergies: Vancomycin    Isolation: None   Infection: None   Code Status: No CPR    Ht: 190 cm (74.8\")   Wt: 84.3 kg (185 lb 13.6 oz)    Admission Cmt: None   Principal Problem: Cardiac arrest [I46.9]                   Active Insurance as of 4/22/2024       Primary Coverage       Payor Plan Insurance Group Employer/Plan Group    MEDICARE MEDICARE A & B        Payor Plan Address Payor Plan Phone Number Payor Plan Fax Number Effective Dates    [address] [phone]  3/1/2010 - None Entered    [facility] [id number]         Subscriber Name Subscriber Birth Date Member ID       JERMAINE SINGH 1945 [id number]               Secondary Coverage       Payor Plan Insurance Group Employer/Plan Group    HUMANA HUMANA  SUP              [id number]       Payor Plan Address Payor Plan Phone Number Payor Plan Fax Number Effective Dates    [address] [address]   8/1/2020 - None Entered    [facility] [id number]         Subscriber Name Subscriber Birth Date Member ID       JERMAINE SINGH 1945 [id number]                     Emergency Contacts        (Rel.) Home Phone Work Phone Mobile Phone    SINGHHIPOLITO (Spouse) [phone] -- [phone]    JUSTHOR GUERRA (Daughter) [phone] -- [phone]                 Discharge Summary        Fran Flores" MD CHARLES at 24 1116          Discharge Summary    Patient name: Jermaine Singh  CSN: [id number]  MRN: [id number]  : 1945  Today's date: 2024     Date of Admission: 2024  Date of Discharge:  2024    Admitting Physician:  Dr. Fran Flores MD; Pulmonology  Primary Care Provider: Marysol Shrestha MD  Consultations:  Dr. Sage Soto MD; Urology     Dr. Leonel Michelle MD; Cardiology     Dr. Lincoln Padilla MD; Infectious Disease     Dr. Blake Tilley MD; Nephrology     Dr. Lexi Flores MD; Palliative Care    Admission Diagnosis: Cardiac arrest     Discharge Diagnoses:   Active Hospital Problems    Diagnosis     Anemia, chronic disease     Stage 3b chronic kidney disease     HFrEF (heart failure with reduced ejection fraction)     Pacemaker     Type 2 diabetes mellitus with hyperglycemia     Hyperlipidemia LDL goal <70     Hypothyroidism (acquired)     Coronary artery disease involving native coronary artery of native heart with angina pectoris        Allergies:  Vancomycin    Code Status and Medical Interventions:   Ordered at: 24 1353     Medical Intervention Limits:    NO intubation (DNI)    NO cardioversion     Level Of Support Discussed With:    Next of Kin (If No Surrogate)    Health Care Surrogate     Code Status (Patient has no pulse and is not breathing):    No CPR (Do Not Attempt to Resuscitate)     Medical Interventions (Patient has pulse or is breathing):    Limited Support       Procedures/Testing:  Insert Central Line at Bedside, 24  Insert Arterial Line, 24     History of Present Illness:  Jermaine Singh is a 79 y.o. male history of T2DM, PAF, SSS status post PPM placement, CHF, PVD, left diabetic foot infection, bilateral DVTs, on Eliquis, who presented to [facility] on 2024 with weakness and chronic right lower extremity wound.  UA was consistent with UTI.  He was started on Rocephin and admitted to the hospitalist service.     On 2024, he suffered a  "V-fib arrest and was intubated during CPR.  He was transferred to the intensive care unit for further care.    Hospital Course:  Patient was able to be extubated on 4/26/24. Given his tenuous respiratory status, his family made him a DNR/DNI. This was also discussed with the patient who was awake and alert during his hospitalization post-extubation. Infectious disease was consulted to manage antimicrobial therapy. Cardiology was consulted for management of newly diagnosed heart failure with LVEF of <20%. Patient was not a candidate for LVAD. Inotrope therapy at home was discussed. Nephrology was consulted for [place] on CKD. Given his recent cardiac arrest and severe cardiomyopathy, palliative care was consulted on 5/3/24 and spoke with the patient and his family. Hospice held a meeting with the patient and family on 5/6/24 and elected to take the patient home under Hospice care. As of 5/8/24, Hospice made arrangements for family to be discharged home. Instructions below.    Vitals:  BP 92/62   Pulse 71   Temp 98.2 °F (36.8 °C) (Oral)   Resp 18   Ht 190 cm (74.8\")   Wt 84.3 kg (185 lb 13.6 oz)   SpO2 98%   BMI 23.35 kg/m²     Constitutional:    Alert, in no acute distress   Head:    Normocephalic, atraumatic   Eyes:            Lids and lashes normal, conjunctivae and sclerae normal.  PER   ENMT:   Ears appear intact with no abnormalities noted       Lips normal.     Neck:   Trachea midline, no JVD   Lungs/Resp:     Normal effort, symmetric chest rise, no crepitus, minimal rales and slightly diminished breath sounds at bases.          Heart/CV:    Regular rhythm and normal rate, no murmur   Abdomen/GI:     Soft, nontender, nondistended   :     Deferred   Extremities/MSK:   No clubbing or cyanosis.  Trace bilateral lower extremity   Pulses:   Pulses palpable and equal bilaterally   Skin:   No bleeding, bruising or rash   Heme/Lymph:   No cervical or supraclavicular adenopathy.   Neurologic:     Psychiatric:      " Moves all extremities with no obvious focal motor deficit.  Cranial nerves 2 - 12 grossly intact  Non-agitated, normal affect.    Labs:  Results from last 7 days   Lab Units 05/07/24  0538   WBC 10*3/mm3 10.63   HEMOGLOBIN g/dL 8.8*   HEMATOCRIT % 27.4*   PLATELETS 10*3/mm3 223     Results from last 7 days   Lab Units 05/07/24  0538 05/06/24  0400 05/05/24  0523   SODIUM mmol/L 136   < > 142   POTASSIUM mmol/L 4.4   < > 4.1   CHLORIDE mmol/L 104   < > 109*   CO2 mmol/L 20.0*   < > 18.0*   BUN mg/dL 79*   < > 71*   CREATININE mg/dL 2.84*   < > 2.81*   CALCIUM mg/dL 8.2*   < > 8.2*   BILIRUBIN mg/dL  --   --  0.5   ALK PHOS U/L  --   --  75   ALT (SGPT) U/L  --   --  65*   AST (SGOT) U/L  --   --  11   GLUCOSE mg/dL 187*   < > 159*    < > = values in this interval not displayed.         Magnesium   Date Value Ref Range Status   05/07/2024 2.6 (H) 1.6 - 2.4 mg/dL Final     Phosphorus   Date Value Ref Range Status   05/07/2024 3.9 2.5 - 4.5 mg/dL Final   05/06/2024 4.6 (H) 2.5 - 4.5 mg/dL Final                    Discharge Medications        New Medications        Instructions Start Date   castor oil-balsam peru ointment   1 Application, Topical, Daily   Start Date: May 9, 2024     furosemide 80 MG tablet  Commonly known as: Lasix   80 mg, Oral, 2 Times Daily      HYDROcodone-acetaminophen 5-325 MG per tablet  Commonly known as: NORCO   1 tablet, Oral, Every 6 Hours PRN      pantoprazole 40 MG EC tablet  Commonly known as: PROTONIX   40 mg, Oral, Every Early Morning   Start Date: May 9, 2024     tamsulosin 0.4 MG capsule 24 hr capsule  Commonly known as: FLOMAX   0.4 mg, Oral, Daily   Start Date: May 9, 2024            Changes to Medications        Instructions Start Date   apixaban 2.5 MG tablet tablet  Commonly known as: ELIQUIS  What changed:   medication strength  how much to take   2.5 mg, Oral, Every 12 Hours Scheduled             Continue These Medications        Instructions Start Date   acetaminophen 325 MG  tablet  Commonly known as: TYLENOL   650 mg, Oral, Every 6 Hours PRN      aspirin 81 MG chewable tablet   81 mg, Oral, Daily   Start Date: May 9, 2024     azelastine 0.1 % nasal spray  Commonly known as: ASTELIN   2 sprays, Nasal, 2 Times Daily, Use in each nostril as directed      Diclofenac Sodium 1 % gel gel  Commonly known as: VOLTAREN   2 g, Topical, 4 Times Daily PRN      levothyroxine 88 MCG tablet  Commonly known as: SYNTHROID, LEVOTHROID   88 mcg, Oral, Every Early Morning   Start Date: May 9, 2024            Stop These Medications      Align 4 MG capsule     atorvastatin 20 MG tablet  Commonly known as: LIPITOR     bumetanide 1 MG tablet  Commonly known as: BUMEX     carvedilol 3.125 MG tablet  Commonly known as: COREG     famotidine 20 MG tablet  Commonly known as: PEPCID     fluocinonide 0.05 % cream  Commonly known as: LIDEX     Gemtesa 75 MG tablet  Generic drug: Vibegron     glucose blood test strip  Commonly known as: OneTouch Verio     Hydrocortisone (Perianal) 2.5 % rectal cream  Commonly known as: ANUSOL-HC     insulin detemir 100 UNIT/ML injection  Commonly known as: LEVEMIR     Insulin Lispro 100 UNIT/ML injection  Commonly known as: humaLOG     isosorbide mononitrate 30 MG 24 hr tablet  Commonly known as: IMDUR     linaclotide 290 MCG capsule capsule  Commonly known as: Linzess     losartan 100 MG tablet  Commonly known as: COZAAR     mupirocin 2 % nasal ointment  Commonly known as: BACTROBAN     nitroglycerin 0.4 MG SL tablet  Commonly known as: NITROSTAT     nystatin 878118 UNIT/GM powder  Commonly known as: MYCOSTATIN     vitamin D3 125 MCG (5000 UT) tablet tablet     Xiidra 5 % ophthalmic solution  Generic drug: Lifitegrast              Diet Instructions       Diet: Cardiac Diets, Diabetic Diets; Healthy Heart (2-3 Na+); Regular (IDDSI 7); Nectar Thick; Consistent Carbohydrate      Discharge Diet:  Cardiac Diets  Diabetic Diets       Cardiac Diet: Healthy Heart (2-3 Na+)    Texture: Regular  (IDDSI 7)    Fluid Consistency: Nectar Thick    Diabetic Diet: Consistent Carbohydrate        Follow-up Appointments  Future Appointments   Date Time Provider Department Center   5/8/2024  3:45 PM MED [facility]   7/24/2024 12:30 PM Marysol Shrestha MD [facility]   1/27/2025  2:30 PM Marysol Shrestha MD [facility]       Discharge Instructions:  Discharge home with Hospice today  Medications per Hospice; patient wishes to be discharged on home medications for now  Transportation per Hospice    Mary Beth Rodriguez, MSN, APRN, ACNPC-AG  Pulmonary and Critical Care Medicine  Electronically signed by DAVID Adam, 05/08/24, 12:56 PM EDT.     I performed an independent history and physical examination. Portions of the history were obtained by APRN and were modified by me according to my findings. The above note reflects my findings, assessment, and plan.    Fran Flores MD    Time: I spent 35 minutes on this discharge activity which included: face-to-face encounter with the patient, reviewing the data in the system, coordination of the care with the nursing staff as well as consultants, documentation, and entering orders.      See my discharge day progress note for additional details in my physical examination findings.  Electronically signed by:  Fran Flores MD  05/08/24  16:08 EDT      CC: Marysol Shrestha MD          Electronically signed by Fran Flores MD at 05/08/24 3912

## 2024-05-08 NOTE — NURSING NOTE
Prior to central line removal, order for the removal of catheter was verified, patient was assessed, necessary materials were gathered and patient was educated regarding procedure .    Patient was positioned supine and flat to ensure that the insertion site was at or below the level of the heart.    Hands were washed, clean gloves were applied and central line dressing was gently removed. Catheter exit site was not cultured.     A new pair of clean gloves were then applied. Insertion site was cleansed with 2% Chlorhexidine swab using a circular motion beginning at the insertion site and moving outward for 30 seconds and allowed to dry.     Clamp on line was not present.     Patient was instructed to hold breath as catheter was withdrawn.     The central line was grasped at the insertion site and slowly pulled outward parallel to the skin. Resistance was not met.    After central line was completely removed, a sterile 4x4 gauze pad was used to apply light pressure until bleeding stopped. At that time, petroleum-based gauze and a sterile occlusive dressing was applied to exit site.     Patient was instructed to keep dressing in place for at least 24 hours and to remain in a flat or reclining position for 30 minutes post-removal.     Catheter was inspected after removal and was intact. Tip of central line was not sent for culture. Patient tolerated procedure.

## 2024-05-28 ENCOUNTER — READMISSION MANAGEMENT (OUTPATIENT)
Dept: CALL CENTER | Facility: HOSPITAL | Age: 79
End: 2024-05-28
Payer: MEDICARE

## 2024-05-28 NOTE — OUTREACH NOTE
Prep Survey      Flowsheet Row Responses   [facility] facility patient discharged from? Non-BH   Is LACE score < 7 ? Non-BH Discharge   Eligibility Not Eligible   What are the reasons patient is not eligible? Other  [No recent admit]   Does the patient have one of the following disease processes/diagnoses(primary or secondary)? Other   Prep survey completed? Yes            Jazzmine CARVER - Registered Nurse          
[phone]

## 2024-07-05 NOTE — CASE MANAGEMENT/SOCIAL WORK
Continued Stay Note   Lynsey     Patient Name: Jermaine Singh  MRN: [id number]  Today's Date: 11/20/2022    Admit Date: 11/14/2022    Plan: Home with   Discharge Plan     Row Name 11/20/22 1218       Plan    Plan Home with    Plan Comments Spoke with wife by phone. They would like [facility] health now. I called Macrina with [facility], they can accept him and will follow up on Monday.    Final Discharge Disposition Code 06 - home with home health care    Row Name 11/20/22 0941       Plan    Plan Home with [facility] for SN/PT/OT    Patient/Family in Agreement with Plan yes    Plan Comments Spoke with patient at bedside. We discussed discharge planning and rehabilitation services. Given list of other resources that may help. They may want a . They would have to private pay. Patient discharge plan is home with [facility] for Skilled Nursing, Physical Therapy and Occupational Therapy. We discussed what to expect with home health services and how they were to help aide patient.    Final Discharge Disposition Code 06 - home with home health care               Discharge Codes    No documentation.               Expected Discharge Date and Time     Expected Discharge Date Expected Discharge Time    Nov 19, 2022             Yolanda Adams RN     Returned call to mom, she said child had a Men B vaccine on Monday and the site is getting more swollen and more painful. She sent a photo to the portal, the area is mildly red, but there I some raised swollen area at the site of injection, and that area is elevated the size of a golf ball per mom, which is hard to see in the photo. I advised mom to go to [facility] due to increase in pain and swelling, mom agreed to go to [facility]   left

## 2024-08-04 NOTE — ED PROVIDER NOTES
Subjective   History of Present Illness  Patient is a pleasant 79-year-old male with long medical history including diabetes, coronary artery disease, chronic kidney disease, hyperlipidemia, hypertension, A-fib on Eliquis, poor baseline mobility.  He presents to the emergency department today due to generalized weakness, dysuria, and inability to stand from his lift chair, and weeping from edema/wounds on his right lower extremity.  Wife is the primary historian.    States that over the past several weeks the patient has had dysuria.  He said his urinalysis checked by his primary care and 1 other specialty of physician.  Ultimately those urinalyses were negative for a urinary tract infection.  Has had progressively worsening weakness over the same time period.  States that he has chronic edema of his right lower extremity which is no more pronounced than usual.  What is new is that he has a new wound on the sole of the foot overlying the distal aspect of the fifth metatarsal.  This wound has been draining clear discharge along with wound over the anterior shin.  There is mild erythema associated with the wound on the anterior shin.            Review of Systems   All other systems reviewed and are negative.      Past Medical History:   Diagnosis Date    Allergic     Arthritis     Asthma     Coronary artery disease     Diabetes mellitus 2000    started on inuslin 12/2018; started on po meds in 2000; checking blood sugars daily     Disease of thyroid gland     po meds daily for hypothyroidism     Elevated serum creatinine 11/15/2022    Elevated troponin 10/02/2022    Generalized weakness 11/15/2022    History of fracture as a child     rt leg- severe     Hyperlipidemia     Hypertension     Hypothyroidism     PAF (paroxysmal atrial fibrillation) 07/23/2023    Peripheral neuropathy     Peripheral vascular disease     s/p angiogram 2/19-needs stent in left leg     Pleural effusion, bilateral 11/15/2022    Proximal  phalanx fracture of the second digit extending into the second metatarsal joint 07/28/2022    RBBB     Right knee pain     Unstable angina 02/12/2019    Added automatically from request for surgery [id number]    Vitamin D deficiency        Allergies   Allergen Reactions    Vancomycin Other (See Comments)     Kidney failure per last admission       Past Surgical History:   Procedure Laterality Date    AMPUTATION DIGIT Left 01/17/2023    Procedure: AMPUTATION DIGIT LEFT;  Surgeon: Gopal Márquez MD;  Location:  [facility];  Service: Vascular;  Laterality: Left;    APPENDECTOMY      CARDIAC CATHETERIZATION N/A 02/14/2019    Procedure: Left Heart Cath;  Surgeon: Cooper Apodaca MD;  Location:  [facility] IN[facility];  Service: Cardiology    CARDIAC ELECTROPHYSIOLOGY PROCEDURE N/A 05/18/2022    Procedure: DEVICE IMPLANT;  Surgeon: Cooper Apodaca MD;  Location: [facility] CATH INVASIVE LOCATION;  Service: Cardiology;  Laterality: N/A;    CARDIAC SURGERY      COLONOSCOPY      CORONARY ARTERY BYPASS GRAFT N/A 03/22/2019    Procedure: MEDIAN STERNOTOMY, CORONARY ARTERY BYPASS GRAFT X3, UTILIZING THE LEFT INTERNAL MAMMARY ARTERY, EVH AND OPEN HARVEST OF THE RIGHT GREATER SAPHENOUS VEIN, EXPLORATION OF THE LEFT LEG;  Surgeon: Ap Au MD;  Location:  [facility];  Service: Cardiothoracic    EYE SURGERY Bilateral     cataracts     FRACTURE SURGERY      INTERVENTIONAL RADIOLOGY PROCEDURE N/A 07/29/2021    Procedure: Abdominal Aortagram with Runoff;  Surgeon: Jermaine Hernandez MD;  Location:  [facility];  Service: Cardiovascular;  Laterality: N/A;    KNEE ARTHROSCOPY      right x 2, left x 1    LACERATION REPAIR      right leg    LEG SURGERY      2 for fracture of rt leg     TONSILLECTOMY      Adnoidectomy    TRANS METATARSAL AMPUTATION Left 08/02/2022    Procedure: GREAT TOE AMPUTATION LEFT;  Surgeon: Gopal Márquez MD;  Location:  [facility];  Service: Vascular;  Laterality: Left;        Family History   Problem Relation Age of Onset    Coronary artery disease Mother     Diabetes Mother     Hyperlipidemia Mother     Cancer Father        Social History     Socioeconomic History    Marital status:     Number of children: 2   Tobacco Use    Smoking status: Never     Passive exposure: Past    Smokeless tobacco: Never   Vaping Use    Vaping status: Never Used   Substance and Sexual Activity    Alcohol use: Not Currently     Comment: every 2-3 months    Drug use: No    Sexual activity: Not Currently     Partners: Female           Objective   Physical Exam  Vitals and nursing note reviewed.   Constitutional:       Appearance: He is ill-appearing.      Comments: Chronic ill appearance.  Elderly   HENT:      Head: Normocephalic and atraumatic.      Mouth/Throat:      Mouth: Mucous membranes are moist.   Eyes:      Extraocular Movements: Extraocular movements intact.      Pupils: Pupils are equal, round, and reactive to light.   Cardiovascular:      Rate and Rhythm: Normal rate and regular rhythm.      Pulses: Normal pulses.      Heart sounds: Normal heart sounds. No murmur heard.  Pulmonary:      Effort: Pulmonary effort is normal. No respiratory distress.      Breath sounds: Normal breath sounds. No wheezing or rhonchi.   Abdominal:      General: Bowel sounds are normal. There is no distension.      Palpations: Abdomen is soft.      Tenderness: There is no abdominal tenderness. There is no guarding or rebound.   Genitourinary:     Penis: Normal.       Comments: Mild tenderness to palpation of the right groin.  Suspected mild inguinal hernia palpated.  Musculoskeletal:         General: Normal range of motion.      Cervical back: Normal range of motion.      Right lower leg: Edema present.      Comments: 3+ edema right lower extremity.  Patient does have a erythematous wound, chronic appearing, over the distal anterior surface of the right shin.  Small amount of clear weeping.  Did no significant  increased warmth.  There is not appear to be erythema spreading from the wound margins.  Also has appears to be a popped blister wound on the sole of his foot overlying the distal aspect of the fifth metatarsal.  Also no erythema extending from this wound.       Skin:     General: Skin is warm and dry.      Capillary Refill: Capillary refill takes 2 to 3 seconds.      Coloration: Skin is pale.   Neurological:      General: No focal deficit present.      Mental Status: He is oriented to person, place, and time.   Psychiatric:         Behavior: Behavior normal.         Thought Content: Thought content normal.         Procedures           ED Course      Recent Results (from the past 24 hour(s))   ECG 12 Lead ED Triage Standing Order; Weak / Dizzy / AMS    Collection Time: 04/22/24  2:46 PM   Result Value Ref Range    QT Interval 488 ms    QTC Interval 580 ms   Comprehensive Metabolic Panel    Collection Time: 04/22/24  3:06 PM    Specimen: Blood   Result Value Ref Range    Glucose 264 (H) 65 - 99 mg/dL    BUN 77 (H) 8 - 23 mg/dL    Creatinine 1.70 (H) 0.76 - 1.27 mg/dL    Sodium 137 136 - 145 mmol/L    Potassium 5.3 (H) 3.5 - 5.2 mmol/L    Chloride 108 (H) 98 - 107 mmol/L    CO2 17.0 (L) 22.0 - 29.0 mmol/L    Calcium 8.8 8.6 - 10.5 mg/dL    Total Protein 6.4 6.0 - 8.5 g/dL    Albumin 3.7 3.5 - 5.2 g/dL    ALT (SGPT) 26 1 - 41 U/L    AST (SGOT) 18 1 - 40 U/L    Alkaline Phosphatase 80 39 - 117 U/L    Total Bilirubin 0.4 0.0 - 1.2 mg/dL    Globulin 2.7 gm/dL    A/G Ratio 1.4 g/dL    BUN/Creatinine Ratio 45.3 (H) 7.0 - 25.0    Anion Gap 12.0 5.0 - 15.0 mmol/L    eGFR 40.5 (L) >60.0 mL/min/1.73   Single High Sensitivity Troponin T    Collection Time: 04/22/24  3:06 PM    Specimen: Blood   Result Value Ref Range    HS Troponin T 123 (C) <22 ng/L   Magnesium    Collection Time: 04/22/24  3:06 PM    Specimen: Blood   Result Value Ref Range    Magnesium 2.2 1.6 - 2.4 mg/dL   Green Top (Gel)    Collection Time: 04/22/24  3:06  PM   Result Value Ref Range    Extra Tube Hold for add-ons.    Lavender Top    Collection Time: 04/22/24  3:06 PM   Result Value Ref Range    Extra Tube hold for add-on    CBC Auto Differential    Collection Time: 04/22/24  3:06 PM    Specimen: Blood   Result Value Ref Range    WBC 10.31 3.40 - 10.80 10*3/mm3    RBC 3.37 (L) 4.14 - 5.80 10*6/mm3    Hemoglobin 10.1 (L) 13.0 - 17.7 g/dL    Hematocrit 33.5 (L) 37.5 - 51.0 %    MCV 99.4 (H) 79.0 - 97.0 fL    MCH 30.0 26.6 - 33.0 pg    MCHC 30.1 (L) 31.5 - 35.7 g/dL    RDW 13.1 12.3 - 15.4 %    RDW-SD 47.4 37.0 - 54.0 fl    MPV 12.2 (H) 6.0 - 12.0 fL    Platelets 108 (L) 140 - 450 10*3/mm3    Neutrophil % 84.7 (H) 42.7 - 76.0 %    Lymphocyte % 3.7 (L) 19.6 - 45.3 %    Monocyte % 9.2 5.0 - 12.0 %    Eosinophil % 1.6 0.3 - 6.2 %    Basophil % 0.3 0.0 - 1.5 %    Immature Grans % 0.5 0.0 - 0.5 %    Neutrophils, Absolute 8.74 (H) 1.70 - 7.00 10*3/mm3    Lymphocytes, Absolute 0.38 (L) 0.70 - 3.10 10*3/mm3    Monocytes, Absolute 0.95 (H) 0.10 - 0.90 10*3/mm3    Eosinophils, Absolute 0.16 0.00 - 0.40 10*3/mm3    Basophils, Absolute 0.03 0.00 - 0.20 10*3/mm3    Immature Grans, Absolute 0.05 0.00 - 0.05 10*3/mm3    nRBC 0.0 0.0 - 0.2 /100 WBC   Urinalysis With Microscopic If Indicated (No Culture) - Straight Cath    Collection Time: 04/22/24  3:54 PM    Specimen: Straight Cath; Urine   Result Value Ref Range    Color, UA Yellow Yellow, Straw    Appearance, UA Clear Clear    pH, UA <=5.0 5.0 - 8.0    Specific Gravity, UA 1.015 1.001 - 1.030    Glucose, UA Negative Negative    Ketones, UA Negative Negative    Bilirubin, UA Negative Negative    Blood, UA Moderate (2+) (A) Negative    Protein, UA 30 mg/dL (1+) (A) Negative    Leuk Esterase, UA Small (1+) (A) Negative    Nitrite, UA Negative Negative    Urobilinogen, UA 0.2 E.U./dL 0.2 - 1.0 E.U./dL   Urinalysis, Microscopic Only - Straight Cath    Collection Time: 04/22/24  3:54 PM    Specimen: Straight Cath; Urine   Result Value Ref  "Range    RBC, UA 11-20 (A) None Seen, 0-2 /HPF    WBC, UA 21-50 (A) None Seen, 0-2 /HPF    Bacteria, UA 2+ (A) None Seen, Trace /HPF    Squamous Epithelial Cells, UA 0-2 None Seen, 0-2 /HPF    Hyaline Casts, UA 0-6 0 - 6 /LPF    Methodology Manual Light Microscopy      Note: In addition to lab results from this visit, the labs listed above may include labs taken at another facility or during a different encounter within the last 24 hours. Please correlate lab times with ED admission and discharge times for further clarification of the services performed during this visit.    XR Chest 1 View   Final Result   Impression:   No active pulmonary process. Stable radiographic appearance of the chest since 3/9/2024.         Electronically Signed: Fran Reis MD     4/22/2024 3:15 PM EDT     Workstation ID: [id number]        Vitals:    04/22/24 1438 04/22/24 1529   BP: 130/70 107/74   Pulse: 84 85   Resp: 16    Temp: 97.5 °F (36.4 °C)    TempSrc: Oral    SpO2: 99% 99%   Weight: 77.1 kg (170 lb)    Height: 190.5 cm (75\")      Medications   sodium chloride 0.9 % flush 10 mL (has no administration in time range)     ECG/EMG Results (last 24 hours)       Procedure Component Value Units Date/Time    ECG 12 Lead ED Triage Standing Order; Weak / Dizzy / AMS [[id number]] Collected: 04/22/24 1446     Updated: 04/22/24 1446     QT Interval 488 ms      QTC Interval 580 ms     Narrative:      Test Reason : ED Triage Standing Order~  Blood Pressure :   */*   mmHG  Vent. Rate :  85 BPM     Atrial Rate :  85 BPM     P-R Int :   * ms          QRS Dur : 162 ms      QT Int : 488 ms       P-R-T Axes :  37 -58 117 degrees     QTc Int : 580 ms    Ventricular-paced rhythm  Abnormal ECG  When compared with ECG of 09-MAR-2024 12:49,  Vent. rate has increased BY  22 BPM    Referred By: EDMD           Confirmed By:           ECG 12 Lead ED Triage Standing Order; Weak / Dizzy / AMS   Preliminary Result   Test Reason : ED Triage Standing Order~ "   Blood Pressure :   */*   mmHG   Vent. Rate :  85 BPM     Atrial Rate :  85 BPM      P-R Int :   * ms          QRS Dur : 162 ms       QT Int : 488 ms       P-R-T Axes :  37 -58 117 degrees      QTc Int : 580 ms      Ventricular-paced rhythm   Abnormal ECG   When compared with ECG of 09-MAR-2024 12:49,   Vent. rate has increased BY  22 BPM      Referred By: EDMD           Confirmed By:                                                  Medical Decision Making  Problems Addressed:  Acute UTI: complicated acute illness or injury  Chronic kidney disease, unspecified CKD stage: complicated acute illness or injury  Edema of right lower extremity: complicated acute illness or injury  Elevated troponin: complicated acute illness or injury  Generalized weakness: complicated acute illness or injury  Hyperkalemia: complicated acute illness or injury  Leg erythema: complicated acute illness or injury    Amount and/or Complexity of Data Reviewed  Labs: ordered.  Radiology: ordered.  ECG/medicine tests: ordered.    Risk  Prescription drug management.  Decision regarding hospitalization.        Final diagnoses:   Acute UTI   Generalized weakness   Edema of right lower extremity   Leg erythema   Chronic kidney disease, unspecified CKD stage   Elevated troponin   Hyperkalemia       ED Disposition  ED Disposition       ED Disposition   Decision to Admit    Condition   --    Comment   Level of Care: Telemetry [5]   Diagnosis: UTI (urinary tract infection) [[id number]]                    Sudeep Bennett DO  04/23/24 0946     Vaccine status unknown

## (undated) DEVICE — SUT PDS 1 CTX 36IN VIO PDP371T

## (undated) DEVICE — ADHS SKIN PREMIERPRO EXOFIN TOPICAL HI/VISC .5ML

## (undated) DEVICE — BNDG ELAS ELITE V/CLOSE 6IN 5YD LF STRL

## (undated) DEVICE — GLV SURG PREMIERPRO MIC LTX PF SZ7 BRN

## (undated) DEVICE — SPNG GZ WOVN 4X4IN 12PLY 10/BX STRL

## (undated) DEVICE — CLTH CLENS READYCLEANSE PERI CARE PK/5

## (undated) DEVICE — CATH DIAG EXPO M/ PK 6FR FL4/FR4 PIG 3PK

## (undated) DEVICE — NDL PERC 1PRT THNWALL W/BASEPLT 18G 7CM

## (undated) DEVICE — TAPE MICROFM 2IN LF

## (undated) DEVICE — SUT SILK 0/0 CT2 18IN C027D

## (undated) DEVICE — KT INTRO SHEATH PERC W/FULL DRP 9F

## (undated) DEVICE — Device

## (undated) DEVICE — SUT PROLN 3/0 SH D/A 36IN 8522H

## (undated) DEVICE — SUT SILK 4/0 TIES 18IN A183H

## (undated) DEVICE — SOL NS 500ML

## (undated) DEVICE — SUT SILK 2 SUTUPAK TIE 60IN SA8H 2STRAND

## (undated) DEVICE — MODEL AT P65, P/N 701554-001KIT CONTENTS: HAND CONTROLLER, 3-WAY HIGH-PRESSURE STOPCOCK WITH ROTATING END AND PREMIUM HIGH-PRESSURE TUBING: Brand: ANGIOTOUCH® KIT

## (undated) DEVICE — DRSNG SURG AQUACEL AG/ADVNTGE 9X15CM 3.5X6IN

## (undated) DEVICE — INTRAOPERATIVE COVER KIT, 10 PACK: Brand: SITE-RITE

## (undated) DEVICE — ELECTRD BLD EZ CLN MOD XLNG 2.75IN

## (undated) DEVICE — VASOVIEW HEMOPRO: Brand: VASOVIEW HEMOPRO

## (undated) DEVICE — GW PERIPH VASC ADX J/TP SS .035 150CM 3MM

## (undated) DEVICE — DRAPE,TOP,102X53,STERILE: Brand: MEDLINE

## (undated) DEVICE — 3M™ STERI-DRAPE™ ISOLATION BAG, 10 PER CARTON / 4 CARTONS PER CASE, 1003: Brand: 3M™ STERI-DRAPE™

## (undated) DEVICE — SKIN AFFIX SURG ADHESIVE 72/CS 0.55ML: Brand: MEDLINE

## (undated) DEVICE — CONTAINER,SPECIMEN,OR STERILE,4OZ: Brand: MEDLINE

## (undated) DEVICE — SUT VIC 3/0 SH 27IN J416H

## (undated) DEVICE — TBG PENCL TELESCP MEGADYNE SMOKE EVAC 10FT

## (undated) DEVICE — RADIFOCUS TORQUE DEVICE MULTI-TORQUE VISE: Brand: RADIFOCUS TORQUE DEVICE

## (undated) DEVICE — SUT ETHLN 2/0 PS 18IN 585H

## (undated) DEVICE — PK HEART OPN 10

## (undated) DEVICE — PRESSURE MONITORING SET: Brand: TRUWAVE, VAMP

## (undated) DEVICE — PINNACLE INTRODUCER SHEATH: Brand: PINNACLE

## (undated) DEVICE — RADIFOCUS GLIDEWIRE: Brand: GLIDEWIRE

## (undated) DEVICE — VIPERTRACK, 50 PACK: Brand: VIPERTRACK

## (undated) DEVICE — TOWEL,OR,DSP,ST,BLUE,STD,8/PK,10PK/CS: Brand: MEDLINE

## (undated) DEVICE — CATH DIAG EXPO .056 FL3.5 6F 100CM

## (undated) DEVICE — SUT PROLN 4/0 RB1 D/A 36IN 8557H

## (undated) DEVICE — DEV INFL MONARCH 25W

## (undated) DEVICE — ANGIO-SEAL EVOLUTION VASCULAR CLOSURE DEVICE: Brand: ANGIO-SEAL

## (undated) DEVICE — GW INQWIRE FC PTFE STD J/1.5 .035 260

## (undated) DEVICE — PENCL ROCKRSWCH MEGADYNE W/HOLSTR 10FT SS

## (undated) DEVICE — PRESSURE MONITORING SET: Brand: TRUWAVE

## (undated) DEVICE — 8 FOOT DISPOSABLE BI-VENTRICULAR PACING CABLE WITH SAFE CONNECT / ALLIGATOR CLIP

## (undated) DEVICE — OASIS DRAIN, SINGLE, INLINE & ATS COMPATIBLE: Brand: OASIS

## (undated) DEVICE — CVR HNDL LT SURG ACCSSRY BLU STRL

## (undated) DEVICE — TRAP FLD MINIVAC MEGADYNE 100ML

## (undated) DEVICE — BANDAGE,GAUZE,BULKEE II,4.5"X4.1YD,STRL: Brand: MEDLINE

## (undated) DEVICE — PRESSURE MONITORING ACCESSORY: Brand: TRUWAVE

## (undated) DEVICE — VIOLET BRAIDED (POLYGLACTIN 910), SYNTHETIC ABSORBABLE SUTURE: Brand: COATED VICRYL

## (undated) DEVICE — PK SPECIALTYCARE M/STATE 1 OH 1/2V CUST

## (undated) DEVICE — ANTIBACTERIAL UNDYED BRAIDED (POLYGLACTIN 910), SYNTHETIC ABSORBABLE SUTURE: Brand: COATED VICRYL

## (undated) DEVICE — TUBING, SUCTION, 1/4" X 10', STRAIGHT: Brand: MEDLINE

## (undated) DEVICE — SAFETY SCALPEL: Brand: DEROYAL

## (undated) DEVICE — PATIENT RETURN ELECTRODE, SINGLE-USE, CONTACT QUALITY MONITORING, ADULT, WITH 9FT CORD, FOR PATIENTS WEIGING OVER 33LBS. (15KG): Brand: MEGADYNE

## (undated) DEVICE — 2963 MEDIPORE SOFT CLOTH TAPE 3 IN X 10 YD 12 RLS/CS: Brand: 3M™ MEDIPORE™

## (undated) DEVICE — INTRO TEAR AWAY/LVD W/SD PRT 6F 13CM

## (undated) DEVICE — GW FC FLOP/TP .035 260CM 3MM

## (undated) DEVICE — MEDI-VAC NON-CONDUCTIVE SUCTION TUBING: Brand: CARDINAL HEALTH

## (undated) DEVICE — PRECISION THIN (9.0 X 0.38 X 31.0MM)

## (undated) DEVICE — SUT PROLN 7/0 CV BV1 24IN 8304H BX/36

## (undated) DEVICE — LEX CATH LAB MINOR: Brand: MEDLINE INDUSTRIES, INC.

## (undated) DEVICE — 450 ML BOTTLE OF 0.05% CHLORHEXIDINE GLUCONATE IN 99.95% STERILE WATER FOR IRRIGATION, USP AND APPLICATOR.: Brand: IRRISEPT ANTIMICROBIAL WOUND LAVAGE

## (undated) DEVICE — SENSR CERBRL O2 SOMASENSOR ADHS A/ LF

## (undated) DEVICE — PK CATH CARD 10

## (undated) DEVICE — THIN OFFSET (9.0 X 0.38 X 25.0MM)

## (undated) DEVICE — GW J TP FIX CORE .035 150

## (undated) DEVICE — PAD ARMBRD SURG CONVOL 7.5X20X2IN

## (undated) DEVICE — TRAP,MUCUS SPECIMEN,40CC: Brand: MEDLINE

## (undated) DEVICE — BALN EVERCROSS OTW .035 5F 5X40 80

## (undated) DEVICE — SUCTION CANISTER, 2500CC, RIGID: Brand: DEROYAL

## (undated) DEVICE — COVADERM PLUS: Brand: DEROYAL

## (undated) DEVICE — BLD SCLPL BEAVR MINI STR 2BVL 180D LF

## (undated) DEVICE — PK EXTREM LOWR 10

## (undated) DEVICE — SUT PROLN 4/0 SH D/A 36IN 8521H

## (undated) DEVICE — SWAN-GANZ THERMODILUTION CATHETER: Brand: SWAN-GANZ

## (undated) DEVICE — DESTINATION RENAL GUIDING SHEATH: Brand: DESTINATION

## (undated) DEVICE — BNDG ELAS ELITE V/CLOSE 4IN 5YD LF STRL

## (undated) DEVICE — CATH MPAC INF F4 JR4/JL4/PIG: Brand: INFINITI

## (undated) DEVICE — DRSNG GZ PETROLTM XEROFORM CURAD 4X4IN STRL

## (undated) DEVICE — DRSNG SURESITE WNDW 4X4.5

## (undated) DEVICE — ARM SLING II: Brand: DEROYAL

## (undated) DEVICE — HEMOCONCENTRATOR CAPIOX PED SM W/TB

## (undated) DEVICE — CONNECTOR PH 6-IN-1 Y ST: Brand: CARDINAL HEALTH

## (undated) DEVICE — LIMB HOLDER, WRIST/ANKLE: Brand: DEROYAL

## (undated) DEVICE — MODEL BT2000 P/N 700287-012KIT CONTENTS: MANIFOLD WITH SALINE AND CONTRAST PORTS, SALINE TUBING WITH SPIKE AND HAND SYRINGE, TRANSDUCER: Brand: BT2000 AUTOMATED MANIFOLD KIT

## (undated) DEVICE — BALN EVERCROSS OTW .035 5F 5X40 135

## (undated) DEVICE — AVANTI + 4F STD W/GW: Brand: AVANTI

## (undated) DEVICE — SUT ETHLN 3/0 FS1 30IN 669H

## (undated) DEVICE — CATH ANGIO SOFT/VU SELECTIVE RIM .035IN 4F 65CM

## (undated) DEVICE — 3M™ TEGADERM™ CHG DRESSING 25/CARTON 4 CARTONS/CASE 1659: Brand: TEGADERM™

## (undated) DEVICE — SUT PROLN 6/0 C1 D/A 30IN 8706H

## (undated) DEVICE — SEALANT HEMO TACHOSIL FIBRIN PTCH 9.5X4.8CM

## (undated) DEVICE — 12 FOOT DISPOSABLE EXTENSION CABLE WITH SAFE CONNECT / SCREW-DOWN

## (undated) DEVICE — Device: Brand: MEDEX

## (undated) DEVICE — SYS SKIN CLS DERMABOND PRINEO W/22CM MESH TP